# Patient Record
Sex: FEMALE | Race: WHITE | NOT HISPANIC OR LATINO | Employment: OTHER | URBAN - METROPOLITAN AREA
[De-identification: names, ages, dates, MRNs, and addresses within clinical notes are randomized per-mention and may not be internally consistent; named-entity substitution may affect disease eponyms.]

---

## 2018-05-24 ENCOUNTER — HOSPITAL ENCOUNTER (INPATIENT)
Facility: HOSPITAL | Age: 68
LOS: 50 days | Discharge: HOME | DRG: 834 | End: 2018-07-13
Attending: INTERNAL MEDICINE | Admitting: INTERNAL MEDICINE
Payer: MEDICARE

## 2018-05-24 DIAGNOSIS — C92.00 AML (ACUTE MYELOID LEUKEMIA) WITH FAILED REMISSION: ICD-10-CM

## 2018-05-24 DIAGNOSIS — E86.0 DEHYDRATION: ICD-10-CM

## 2018-05-24 DIAGNOSIS — E83.39 HYPOPHOSPHATEMIA: Primary | ICD-10-CM

## 2018-05-24 DIAGNOSIS — T45.1X5A ADVERSE EFFECT OF CHEMOTHERAPY, INITIAL ENCOUNTER: ICD-10-CM

## 2018-05-24 DIAGNOSIS — R50.81 NEUTROPENIC FEVER: ICD-10-CM

## 2018-05-24 DIAGNOSIS — D70.9 NEUTROPENIC FEVER: ICD-10-CM

## 2018-05-24 DIAGNOSIS — T45.1X5A CINV (CHEMOTHERAPY-INDUCED NAUSEA AND VOMITING): ICD-10-CM

## 2018-05-24 DIAGNOSIS — J81.1 PULMONARY EDEMA: ICD-10-CM

## 2018-05-24 DIAGNOSIS — I36.9 NONRHEUMATIC TRICUSPID VALVE DISORDER: ICD-10-CM

## 2018-05-24 DIAGNOSIS — C93.00 ACUTE MONOCYTIC LEUKEMIA NOT HAVING ACHIEVED REMISSION: ICD-10-CM

## 2018-05-24 DIAGNOSIS — C93.00: ICD-10-CM

## 2018-05-24 DIAGNOSIS — I42.9 CARDIOMYOPATHY: ICD-10-CM

## 2018-05-24 DIAGNOSIS — R94.31 PROLONGED Q-T INTERVAL ON ECG: ICD-10-CM

## 2018-05-24 DIAGNOSIS — C95.00 ACUTE LEUKEMIA: ICD-10-CM

## 2018-05-24 DIAGNOSIS — T45.1X5A CHEMOTHERAPY INDUCED CARDIOMYOPATHY: ICD-10-CM

## 2018-05-24 DIAGNOSIS — C92.00 AML (ACUTE MYELOBLASTIC LEUKEMIA): ICD-10-CM

## 2018-05-24 DIAGNOSIS — R11.2 CINV (CHEMOTHERAPY-INDUCED NAUSEA AND VOMITING): ICD-10-CM

## 2018-05-24 DIAGNOSIS — R21 RASH OF GENITAL AREA: ICD-10-CM

## 2018-05-24 DIAGNOSIS — E83.42 HYPOMAGNESEMIA: ICD-10-CM

## 2018-05-24 DIAGNOSIS — R00.0 TACHYCARDIA: ICD-10-CM

## 2018-05-24 DIAGNOSIS — I34.9 NONRHEUMATIC MITRAL VALVE DISORDER: ICD-10-CM

## 2018-05-24 DIAGNOSIS — D61.818 PANCYTOPENIA: ICD-10-CM

## 2018-05-24 DIAGNOSIS — E87.6 HYPOKALEMIA: ICD-10-CM

## 2018-05-24 DIAGNOSIS — T45.1X5A CHEMOTHERAPY ADVERSE REACTION: ICD-10-CM

## 2018-05-24 DIAGNOSIS — R93.1 ABNORMAL ECHOCARDIOGRAM: ICD-10-CM

## 2018-05-24 DIAGNOSIS — E83.51 HYPOCALCEMIA: ICD-10-CM

## 2018-05-24 DIAGNOSIS — C92.00 ACUTE MYELOID LEUKEMIA NOT HAVING ACHIEVED REMISSION: ICD-10-CM

## 2018-05-24 DIAGNOSIS — I42.7 CHEMOTHERAPY INDUCED CARDIOMYOPATHY: ICD-10-CM

## 2018-05-24 PROCEDURE — 20600001 HC STEP DOWN PRIVATE ROOM

## 2018-05-24 RX ORDER — CIPROFLOXACIN 500 MG/1
500 TABLET ORAL EVERY 12 HOURS
Status: DISCONTINUED | OUTPATIENT
Start: 2018-05-24 | End: 2018-05-28

## 2018-05-24 RX ORDER — HYDROXYUREA 500 MG/1
2000 CAPSULE ORAL 2 TIMES DAILY
Status: DISCONTINUED | OUTPATIENT
Start: 2018-05-24 | End: 2018-05-27

## 2018-05-24 RX ORDER — METOPROLOL SUCCINATE 25 MG/1
25 TABLET, EXTENDED RELEASE ORAL DAILY
Status: ON HOLD | COMMUNITY
End: 2018-07-13 | Stop reason: HOSPADM

## 2018-05-24 RX ORDER — ESCITALOPRAM OXALATE 10 MG/1
10 TABLET ORAL DAILY
COMMUNITY
End: 2019-06-19

## 2018-05-24 RX ORDER — OMEPRAZOLE 20 MG/1
20 TABLET, DELAYED RELEASE ORAL EVERY MORNING
Status: ON HOLD | COMMUNITY
End: 2020-10-28 | Stop reason: HOSPADM

## 2018-05-24 RX ORDER — ALLOPURINOL 300 MG/1
300 TABLET ORAL DAILY
Status: DISCONTINUED | OUTPATIENT
Start: 2018-05-24 | End: 2018-06-11

## 2018-05-24 RX ORDER — ACYCLOVIR 200 MG/1
400 CAPSULE ORAL 2 TIMES DAILY
Status: DISCONTINUED | OUTPATIENT
Start: 2018-05-24 | End: 2018-06-07

## 2018-05-25 ENCOUNTER — RESEARCH ENCOUNTER (OUTPATIENT)
Dept: RESEARCH | Facility: HOSPITAL | Age: 68
End: 2018-05-25

## 2018-05-25 PROBLEM — K21.9 GERD (GASTROESOPHAGEAL REFLUX DISEASE): Status: ACTIVE | Noted: 2018-05-25

## 2018-05-25 PROBLEM — M81.0 OSTEOPOROSIS: Status: ACTIVE | Noted: 2018-05-25

## 2018-05-25 PROBLEM — C92.00 AML (ACUTE MYELOBLASTIC LEUKEMIA): Status: ACTIVE | Noted: 2018-05-24

## 2018-05-25 PROBLEM — E83.39 HYPOPHOSPHATEMIA: Status: RESOLVED | Noted: 2018-05-25 | Resolved: 2018-05-25

## 2018-05-25 PROBLEM — I10 HTN (HYPERTENSION): Status: ACTIVE | Noted: 2018-05-25

## 2018-05-25 PROBLEM — R21 RASH: Status: ACTIVE | Noted: 2018-05-25

## 2018-05-25 PROBLEM — F32.A DEPRESSION: Status: ACTIVE | Noted: 2018-05-25

## 2018-05-25 PROBLEM — E87.6 HYPOKALEMIA: Status: ACTIVE | Noted: 2018-05-25

## 2018-05-25 PROBLEM — E83.39 HYPOPHOSPHATEMIA: Status: ACTIVE | Noted: 2018-05-25

## 2018-05-25 PROBLEM — E87.8 ELECTROLYTE ABNORMALITY: Status: ACTIVE | Noted: 2018-05-25

## 2018-05-25 PROBLEM — C95.00 ACUTE LEUKEMIA: Status: ACTIVE | Noted: 2018-05-24

## 2018-05-25 LAB
ABO + RH BLD: NORMAL
ALBUMIN SERPL BCP-MCNC: 3.2 G/DL
ALP SERPL-CCNC: 211 U/L
ALT SERPL W/O P-5'-P-CCNC: 19 U/L
ANION GAP SERPL CALC-SCNC: 12 MMOL/L
ANISOCYTOSIS BLD QL SMEAR: SLIGHT
APTT BLDCRRT: <21 SEC
AST SERPL-CCNC: 24 U/L
BASOPHILS # BLD AUTO: ABNORMAL K/UL
BASOPHILS NFR BLD: 1 %
BILIRUB SERPL-MCNC: 0.5 MG/DL
BLD GP AB SCN CELLS X3 SERPL QL: NORMAL
BONE MARROW WRIGHT STAIN COMMENT: NORMAL
BUN SERPL-MCNC: 10 MG/DL
CALCIUM SERPL-MCNC: 8.3 MG/DL
CHLORIDE SERPL-SCNC: 106 MMOL/L
CO2 SERPL-SCNC: 21 MMOL/L
CREAT SERPL-MCNC: 1.4 MG/DL
DIFFERENTIAL METHOD: ABNORMAL
EOSINOPHIL # BLD AUTO: ABNORMAL K/UL
EOSINOPHIL NFR BLD: 0 %
ERYTHROCYTE [DISTWIDTH] IN BLOOD BY AUTOMATED COUNT: 17.9 %
EST. GFR  (AFRICAN AMERICAN): 44.9 ML/MIN/1.73 M^2
EST. GFR  (NON AFRICAN AMERICAN): 38.9 ML/MIN/1.73 M^2
ESTIMATED PA SYSTOLIC PRESSURE: 36.18
FIBRINOGEN PPP-MCNC: 431 MG/DL
GLOBAL PERICARDIAL EFFUSION: NORMAL
GLUCOSE SERPL-MCNC: 113 MG/DL
HAV IGM SERPL QL IA: NEGATIVE
HBV CORE IGM SERPL QL IA: NEGATIVE
HBV SURFACE AG SERPL QL IA: NEGATIVE
HCT VFR BLD AUTO: 23.6 %
HCV AB SERPL QL IA: NEGATIVE
HGB BLD-MCNC: 7.5 G/DL
HIV 1+2 AB+HIV1 P24 AG SERPL QL IA: NEGATIVE
IMM GRANULOCYTES # BLD AUTO: ABNORMAL K/UL
IMM GRANULOCYTES NFR BLD AUTO: ABNORMAL %
INR PPP: 1.1
LDH SERPL L TO P-CCNC: 570 U/L
LYMPHOCYTES # BLD AUTO: ABNORMAL K/UL
LYMPHOCYTES NFR BLD: 10 %
MAGNESIUM SERPL-MCNC: 1.7 MG/DL
MCH RBC QN AUTO: 32.1 PG
MCHC RBC AUTO-ENTMCNC: 31.8 G/DL
MCV RBC AUTO: 101 FL
METAMYELOCYTES NFR BLD MANUAL: 2 %
MONOCYTES # BLD AUTO: ABNORMAL K/UL
MONOCYTES NFR BLD: 20 %
MYELOCYTES NFR BLD MANUAL: 4 %
NEUTROPHILS NFR BLD: 5 %
NEUTS BAND NFR BLD MANUAL: 2 %
NRBC BLD-RTO: 0 /100 WBC
OVALOCYTES BLD QL SMEAR: ABNORMAL
PATH REV BLD -IMP: NORMAL
PATH REV BLD -IMP: NORMAL
PHOSPHATE SERPL-MCNC: 1.7 MG/DL
PLATELET # BLD AUTO: 135 K/UL
PMV BLD AUTO: 10 FL
POIKILOCYTOSIS BLD QL SMEAR: SLIGHT
POLYCHROMASIA BLD QL SMEAR: ABNORMAL
POTASSIUM SERPL-SCNC: 3 MMOL/L
PROMYELOCYTES NFR BLD MANUAL: 1 %
PROT SERPL-MCNC: 6.9 G/DL
PROTHROMBIN TIME: 11.5 SEC
RBC # BLD AUTO: 2.34 M/UL
RETIRED EF AND QEF - SEE NOTES: 65 (ref 55–65)
SODIUM SERPL-SCNC: 139 MMOL/L
TRICUSPID VALVE REGURGITATION: NORMAL
URATE SERPL-MCNC: 10.3 MG/DL
URATE SERPL-MCNC: 7.7 MG/DL
WBC # BLD AUTO: 99.7 K/UL
WBC OTHER NFR BLD MANUAL: 55 %

## 2018-05-25 PROCEDURE — 88275 CYTOGENETICS 100-300: CPT

## 2018-05-25 PROCEDURE — 88184 FLOWCYTOMETRY/ TC 1 MARKER: CPT | Performed by: PATHOLOGY

## 2018-05-25 PROCEDURE — 88313 SPECIAL STAINS GROUP 2: CPT | Mod: 26,,, | Performed by: PATHOLOGY

## 2018-05-25 PROCEDURE — 88342 IMHCHEM/IMCYTCHM 1ST ANTB: CPT | Performed by: PATHOLOGY

## 2018-05-25 PROCEDURE — 25000003 PHARM REV CODE 250: Performed by: INTERNAL MEDICINE

## 2018-05-25 PROCEDURE — 88271 CYTOGENETICS DNA PROBE: CPT | Mod: 59

## 2018-05-25 PROCEDURE — 84550 ASSAY OF BLOOD/URIC ACID: CPT | Mod: 91

## 2018-05-25 PROCEDURE — 88271 CYTOGENETICS DNA PROBE: CPT

## 2018-05-25 PROCEDURE — 88311 DECALCIFY TISSUE: CPT | Mod: 26,,, | Performed by: PATHOLOGY

## 2018-05-25 PROCEDURE — C1751 CATH, INF, PER/CENT/MIDLINE: HCPCS

## 2018-05-25 PROCEDURE — 63600175 PHARM REV CODE 636 W HCPCS: Performed by: INTERNAL MEDICINE

## 2018-05-25 PROCEDURE — 81310 NPM1 GENE: CPT

## 2018-05-25 PROCEDURE — 88185 FLOWCYTOMETRY/TC ADD-ON: CPT | Performed by: PATHOLOGY

## 2018-05-25 PROCEDURE — 84550 ASSAY OF BLOOD/URIC ACID: CPT

## 2018-05-25 PROCEDURE — 80053 COMPREHEN METABOLIC PANEL: CPT

## 2018-05-25 PROCEDURE — 88275 CYTOGENETICS 100-300: CPT | Mod: 59

## 2018-05-25 PROCEDURE — 88305 TISSUE EXAM BY PATHOLOGIST: CPT | Mod: 26,,, | Performed by: PATHOLOGY

## 2018-05-25 PROCEDURE — 84100 ASSAY OF PHOSPHORUS: CPT

## 2018-05-25 PROCEDURE — 02HV33Z INSERTION OF INFUSION DEVICE INTO SUPERIOR VENA CAVA, PERCUTANEOUS APPROACH: ICD-10-PCS | Performed by: INTERNAL MEDICINE

## 2018-05-25 PROCEDURE — A4216 STERILE WATER/SALINE, 10 ML: HCPCS | Performed by: INTERNAL MEDICINE

## 2018-05-25 PROCEDURE — 25000003 PHARM REV CODE 250: Performed by: STUDENT IN AN ORGANIZED HEALTH CARE EDUCATION/TRAINING PROGRAM

## 2018-05-25 PROCEDURE — 83615 LACTATE (LD) (LDH) ENZYME: CPT

## 2018-05-25 PROCEDURE — 93306 TTE W/DOPPLER COMPLETE: CPT

## 2018-05-25 PROCEDURE — 25000003 PHARM REV CODE 250: Performed by: NURSE PRACTITIONER

## 2018-05-25 PROCEDURE — 85384 FIBRINOGEN ACTIVITY: CPT

## 2018-05-25 PROCEDURE — 38222 DX BONE MARROW BX & ASPIR: CPT | Mod: LT,,, | Performed by: NURSE PRACTITIONER

## 2018-05-25 PROCEDURE — 88189 FLOWCYTOMETRY/READ 16 & >: CPT | Mod: ,,, | Performed by: PATHOLOGY

## 2018-05-25 PROCEDURE — 07DR3ZX EXTRACTION OF ILIAC BONE MARROW, PERCUTANEOUS APPROACH, DIAGNOSTIC: ICD-10-PCS | Performed by: INTERNAL MEDICINE

## 2018-05-25 PROCEDURE — 88341 IMHCHEM/IMCYTCHM EA ADD ANTB: CPT | Mod: 26,,, | Performed by: PATHOLOGY

## 2018-05-25 PROCEDURE — 85060 BLOOD SMEAR INTERPRETATION: CPT | Mod: ,,, | Performed by: PATHOLOGY

## 2018-05-25 PROCEDURE — 81245 FLT3 GENE: CPT

## 2018-05-25 PROCEDURE — 83735 ASSAY OF MAGNESIUM: CPT

## 2018-05-25 PROCEDURE — 86901 BLOOD TYPING SEROLOGIC RH(D): CPT

## 2018-05-25 PROCEDURE — 80074 ACUTE HEPATITIS PANEL: CPT

## 2018-05-25 PROCEDURE — 85027 COMPLETE CBC AUTOMATED: CPT

## 2018-05-25 PROCEDURE — 85097 BONE MARROW INTERPRETATION: CPT | Mod: ,,, | Performed by: PATHOLOGY

## 2018-05-25 PROCEDURE — 36569 INSJ PICC 5 YR+ W/O IMAGING: CPT

## 2018-05-25 PROCEDURE — 93306 TTE W/DOPPLER COMPLETE: CPT | Mod: 26,,, | Performed by: INTERNAL MEDICINE

## 2018-05-25 PROCEDURE — 63600175 PHARM REV CODE 636 W HCPCS: Performed by: NURSE PRACTITIONER

## 2018-05-25 PROCEDURE — 36415 COLL VENOUS BLD VENIPUNCTURE: CPT

## 2018-05-25 PROCEDURE — 86703 HIV-1/HIV-2 1 RESULT ANTBDY: CPT

## 2018-05-25 PROCEDURE — 88305 TISSUE EXAM BY PATHOLOGIST: CPT | Performed by: PATHOLOGY

## 2018-05-25 PROCEDURE — 88313 SPECIAL STAINS GROUP 2: CPT

## 2018-05-25 PROCEDURE — 85610 PROTHROMBIN TIME: CPT

## 2018-05-25 PROCEDURE — 20600001 HC STEP DOWN PRIVATE ROOM

## 2018-05-25 PROCEDURE — 81450 HL NEO GSAP 5-50DNA/DNA&RNA: CPT

## 2018-05-25 PROCEDURE — 88264 CHROMOSOME ANALYSIS 20-25: CPT

## 2018-05-25 PROCEDURE — 88237 TISSUE CULTURE BONE MARROW: CPT

## 2018-05-25 PROCEDURE — 81218 CEBPA GENE FULL SEQUENCE: CPT

## 2018-05-25 PROCEDURE — 76937 US GUIDE VASCULAR ACCESS: CPT

## 2018-05-25 PROCEDURE — 86920 COMPATIBILITY TEST SPIN: CPT

## 2018-05-25 PROCEDURE — 99223 1ST HOSP IP/OBS HIGH 75: CPT | Mod: AI,GC,, | Performed by: INTERNAL MEDICINE

## 2018-05-25 PROCEDURE — 88342 IMHCHEM/IMCYTCHM 1ST ANTB: CPT | Mod: 26,,, | Performed by: PATHOLOGY

## 2018-05-25 PROCEDURE — 85730 THROMBOPLASTIN TIME PARTIAL: CPT

## 2018-05-25 PROCEDURE — 85007 BL SMEAR W/DIFF WBC COUNT: CPT

## 2018-05-25 RX ORDER — SODIUM CHLORIDE 0.9 % (FLUSH) 0.9 %
10 SYRINGE (ML) INJECTION
Status: DISCONTINUED | OUTPATIENT
Start: 2018-05-25 | End: 2018-07-13 | Stop reason: HOSPADM

## 2018-05-25 RX ORDER — HYDROMORPHONE HYDROCHLORIDE 1 MG/ML
0.5 INJECTION, SOLUTION INTRAMUSCULAR; INTRAVENOUS; SUBCUTANEOUS ONCE
Status: COMPLETED | OUTPATIENT
Start: 2018-05-25 | End: 2018-05-25

## 2018-05-25 RX ORDER — LANOLIN ALCOHOL/MO/W.PET/CERES
400 CREAM (GRAM) TOPICAL EVERY 4 HOURS PRN
Status: DISCONTINUED | OUTPATIENT
Start: 2018-05-25 | End: 2018-05-25

## 2018-05-25 RX ORDER — ALPRAZOLAM 0.25 MG/1
0.25 TABLET ORAL 2 TIMES DAILY PRN
Status: DISCONTINUED | OUTPATIENT
Start: 2018-05-25 | End: 2018-05-29

## 2018-05-25 RX ORDER — INSULIN ASPART 100 [IU]/ML
0-5 INJECTION, SOLUTION INTRAVENOUS; SUBCUTANEOUS
Status: DISCONTINUED | OUTPATIENT
Start: 2018-05-25 | End: 2018-05-25

## 2018-05-25 RX ORDER — ESCITALOPRAM OXALATE 10 MG/1
10 TABLET ORAL DAILY
Status: DISCONTINUED | OUTPATIENT
Start: 2018-05-25 | End: 2018-07-13 | Stop reason: HOSPADM

## 2018-05-25 RX ORDER — GLUCAGON 1 MG
1 KIT INJECTION
Status: DISCONTINUED | OUTPATIENT
Start: 2018-05-25 | End: 2018-05-25

## 2018-05-25 RX ORDER — POTASSIUM CHLORIDE 750 MG/1
20 CAPSULE, EXTENDED RELEASE ORAL
Status: DISCONTINUED | OUTPATIENT
Start: 2018-05-25 | End: 2018-05-25

## 2018-05-25 RX ORDER — ONDANSETRON 4 MG/1
4 TABLET, ORALLY DISINTEGRATING ORAL EVERY 8 HOURS PRN
Status: DISCONTINUED | OUTPATIENT
Start: 2018-05-25 | End: 2018-05-29

## 2018-05-25 RX ORDER — POTASSIUM CHLORIDE 20 MEQ/1
40 TABLET, EXTENDED RELEASE ORAL ONCE
Status: COMPLETED | OUTPATIENT
Start: 2018-05-25 | End: 2018-05-25

## 2018-05-25 RX ORDER — ONDANSETRON 4 MG/1
4 TABLET, ORALLY DISINTEGRATING ORAL ONCE
Status: DISCONTINUED | OUTPATIENT
Start: 2018-05-25 | End: 2018-05-28

## 2018-05-25 RX ORDER — SODIUM CHLORIDE 9 MG/ML
INJECTION, SOLUTION INTRAVENOUS CONTINUOUS
Status: DISCONTINUED | OUTPATIENT
Start: 2018-05-25 | End: 2018-05-28

## 2018-05-25 RX ORDER — IBUPROFEN 200 MG
24 TABLET ORAL
Status: DISCONTINUED | OUTPATIENT
Start: 2018-05-25 | End: 2018-05-25

## 2018-05-25 RX ORDER — SODIUM,POTASSIUM PHOSPHATES 280-250MG
2 POWDER IN PACKET (EA) ORAL EVERY 4 HOURS PRN
Status: DISCONTINUED | OUTPATIENT
Start: 2018-05-25 | End: 2018-05-25

## 2018-05-25 RX ORDER — OXYCODONE AND ACETAMINOPHEN 5; 325 MG/1; MG/1
1 TABLET ORAL ONCE
Status: COMPLETED | OUTPATIENT
Start: 2018-05-25 | End: 2018-05-25

## 2018-05-25 RX ORDER — ENOXAPARIN SODIUM 100 MG/ML
40 INJECTION SUBCUTANEOUS EVERY 24 HOURS
Status: DISCONTINUED | OUTPATIENT
Start: 2018-05-25 | End: 2018-05-27

## 2018-05-25 RX ORDER — ONDANSETRON 4 MG/1
4 TABLET, FILM COATED ORAL ONCE
Status: COMPLETED | OUTPATIENT
Start: 2018-05-25 | End: 2018-05-25

## 2018-05-25 RX ORDER — SODIUM,POTASSIUM PHOSPHATES 280-250MG
1 POWDER IN PACKET (EA) ORAL EVERY 4 HOURS PRN
Status: DISCONTINUED | OUTPATIENT
Start: 2018-05-25 | End: 2018-05-25

## 2018-05-25 RX ORDER — FAMOTIDINE 20 MG/1
20 TABLET, FILM COATED ORAL DAILY
Status: DISCONTINUED | OUTPATIENT
Start: 2018-05-25 | End: 2018-05-29

## 2018-05-25 RX ORDER — IBUPROFEN 200 MG
16 TABLET ORAL
Status: DISCONTINUED | OUTPATIENT
Start: 2018-05-25 | End: 2018-05-25

## 2018-05-25 RX ORDER — FAMOTIDINE 20 MG/1
20 TABLET, FILM COATED ORAL 2 TIMES DAILY
Status: DISCONTINUED | OUTPATIENT
Start: 2018-05-25 | End: 2018-05-25

## 2018-05-25 RX ORDER — ONDANSETRON 4 MG/1
4 TABLET, ORALLY DISINTEGRATING ORAL ONCE
Status: DISCONTINUED | OUTPATIENT
Start: 2018-05-25 | End: 2018-05-25

## 2018-05-25 RX ORDER — LANOLIN ALCOHOL/MO/W.PET/CERES
800 CREAM (GRAM) TOPICAL EVERY 4 HOURS PRN
Status: DISCONTINUED | OUTPATIENT
Start: 2018-05-25 | End: 2018-05-25

## 2018-05-25 RX ORDER — ACETAMINOPHEN 325 MG/1
650 TABLET ORAL EVERY 4 HOURS PRN
Status: DISCONTINUED | OUTPATIENT
Start: 2018-05-25 | End: 2018-06-14

## 2018-05-25 RX ORDER — SODIUM CHLORIDE 0.9 % (FLUSH) 0.9 %
5 SYRINGE (ML) INJECTION
Status: DISCONTINUED | OUTPATIENT
Start: 2018-05-25 | End: 2018-07-06

## 2018-05-25 RX ORDER — SODIUM CHLORIDE 0.9 % (FLUSH) 0.9 %
10 SYRINGE (ML) INJECTION EVERY 6 HOURS
Status: DISCONTINUED | OUTPATIENT
Start: 2018-05-25 | End: 2018-07-13 | Stop reason: HOSPADM

## 2018-05-25 RX ORDER — LIDOCAINE HYDROCHLORIDE 20 MG/ML
10 INJECTION, SOLUTION INFILTRATION; PERINEURAL ONCE
Status: COMPLETED | OUTPATIENT
Start: 2018-05-25 | End: 2018-05-25

## 2018-05-25 RX ADMIN — ONDANSETRON 4 MG: 4 TABLET, ORALLY DISINTEGRATING ORAL at 07:05

## 2018-05-25 RX ADMIN — ALLOPURINOL 300 MG: 300 TABLET ORAL at 09:05

## 2018-05-25 RX ADMIN — FAMOTIDINE 20 MG: 20 TABLET ORAL at 09:05

## 2018-05-25 RX ADMIN — CIPROFLOXACIN HYDROCHLORIDE 500 MG: 500 TABLET, FILM COATED ORAL at 09:05

## 2018-05-25 RX ADMIN — ONDANSETRON HYDROCHLORIDE 4 MG: 4 TABLET, FILM COATED ORAL at 02:05

## 2018-05-25 RX ADMIN — ESCITALOPRAM 10 MG: 5 TABLET, FILM COATED ORAL at 09:05

## 2018-05-25 RX ADMIN — LIDOCAINE HYDROCHLORIDE 10 ML: 20 INJECTION, SOLUTION INFILTRATION; PERINEURAL at 02:05

## 2018-05-25 RX ADMIN — POTASSIUM CHLORIDE 40 MEQ: 1500 TABLET, EXTENDED RELEASE ORAL at 02:05

## 2018-05-25 RX ADMIN — HYDROXYUREA 2000 MG: 500 CAPSULE ORAL at 12:05

## 2018-05-25 RX ADMIN — Medication 10 ML: at 10:05

## 2018-05-25 RX ADMIN — ALPRAZOLAM 0.25 MG: 0.25 TABLET ORAL at 09:05

## 2018-05-25 RX ADMIN — SODIUM CHLORIDE: 0.9 INJECTION, SOLUTION INTRAVENOUS at 02:05

## 2018-05-25 RX ADMIN — CIPROFLOXACIN HYDROCHLORIDE 500 MG: 500 TABLET, FILM COATED ORAL at 10:05

## 2018-05-25 RX ADMIN — CIPROFLOXACIN HYDROCHLORIDE 500 MG: 500 TABLET, FILM COATED ORAL at 12:05

## 2018-05-25 RX ADMIN — HYDROMORPHONE HYDROCHLORIDE 0.5 MG: 1 INJECTION, SOLUTION INTRAMUSCULAR; INTRAVENOUS; SUBCUTANEOUS at 02:05

## 2018-05-25 RX ADMIN — ENOXAPARIN SODIUM 40 MG: 100 INJECTION SUBCUTANEOUS at 05:05

## 2018-05-25 RX ADMIN — HYDROXYUREA 2000 MG: 500 CAPSULE ORAL at 09:05

## 2018-05-25 RX ADMIN — SODIUM CHLORIDE: 0.9 INJECTION, SOLUTION INTRAVENOUS at 10:05

## 2018-05-25 RX ADMIN — OXYCODONE HYDROCHLORIDE AND ACETAMINOPHEN 1 TABLET: 5; 325 TABLET ORAL at 07:05

## 2018-05-25 RX ADMIN — SODIUM PHOSPHATE, MONOBASIC, MONOHYDRATE 15 MMOL: 276; 142 INJECTION, SOLUTION INTRAVENOUS at 02:05

## 2018-05-25 RX ADMIN — Medication 10 ML: at 06:05

## 2018-05-25 RX ADMIN — ACYCLOVIR 400 MG: 200 CAPSULE ORAL at 09:05

## 2018-05-25 RX ADMIN — OXYCODONE HYDROCHLORIDE AND ACETAMINOPHEN 1 TABLET: 5; 325 TABLET ORAL at 01:05

## 2018-05-25 RX ADMIN — ACYCLOVIR 400 MG: 200 CAPSULE ORAL at 12:05

## 2018-05-25 RX ADMIN — ALLOPURINOL 300 MG: 300 TABLET ORAL at 12:05

## 2018-05-25 RX ADMIN — ACYCLOVIR 400 MG: 200 CAPSULE ORAL at 10:05

## 2018-05-25 RX ADMIN — ACETAMINOPHEN 650 MG: 325 TABLET, FILM COATED ORAL at 12:05

## 2018-05-25 RX ADMIN — HYDROXYUREA 2000 MG: 500 CAPSULE ORAL at 10:05

## 2018-05-25 NOTE — PLAN OF CARE
Problem: Patient Care Overview  Goal: Plan of Care Review  Outcome: Ongoing (interventions implemented as appropriate)  AAOx4, in a pleasant mood. Ambulated independently. Able to vocalize needs and participate in care. IVF infusing  And electrolyted replaced. Swallows w/o complication. Headache x 1 managed w PO PRN pain meds. Oriented to room and call light. Bed locked in low position, call light in reach.

## 2018-05-25 NOTE — ASSESSMENT & PLAN NOTE
- rash to BLE (shins)  - states it occurred mid April 2018 after 3rd dose of prolia  - possible that rash is from AML  - improving on own, will defer bx at this time and continue to monitor

## 2018-05-25 NOTE — PROGRESS NOTES
[facility]  Hematology  Bone Marrow Transplant  Progress Note    Patient Name: Noreen Mac  Admission Date: 5/24/2018  Hospital Length of Stay: 1 days  Code Status: Full Code    Subjective:     Interval History:   Pt admitted overnight for likely new AML. Pt is now on hydrea 2 grams BID, allopurinol 300 mg daily, and IVF. Pt may need rasburicase this weekend. She will undergo a BM bx and aspiration today. She is an induction candidate and will not be screened for research trials. She has anxiety for which she usually takes xanax, this was re-started.     Objective:     Vital Signs (Most Recent):  Temp: 98.3 °F (36.8 °C) (05/25/18 1153)  Pulse: 88 (05/25/18 1153)  Resp: 19 (05/25/18 1153)  BP: (!) 107/59 (05/25/18 1153)  SpO2: (!) 93 % (05/25/18 1153) Vital Signs (24h Range):  Temp:  [98 °F (36.7 °C)-99 °F (37.2 °C)] 98.3 °F (36.8 °C)  Pulse:  [76-88] 88  Resp:  [18-21] 19  SpO2:  [92 %-95 %] 93 %  BP: (107-123)/(53-59) 107/59     Weight: 75.7 kg (166 lb 12.5 oz)  Body mass index is 29.54 kg/m².  Body surface area is 1.83 meters squared.    ECOG SCORE         90% KPS    Intake/Output - Last 3 Shifts     None          Physical Exam   Constitutional: She is oriented to person, place, and time. Vital signs are normal. She is cooperative.   HENT:   Head: Normocephalic.   Eyes: Lids are normal.   Neck: Trachea normal and normal range of motion.   Cardiovascular: Normal rate, regular rhythm, S1 normal, S2 normal and normal heart sounds.    Pulmonary/Chest: Effort normal and breath sounds normal.   Abdominal: Soft. Normal appearance and bowel sounds are normal.   Musculoskeletal: Normal range of motion.   Lymphadenopathy:        Head (right side): No submental, no submandibular, no tonsillar, no preauricular, no posterior auricular and no occipital adenopathy present.        Head (left side): No submental, no submandibular, no tonsillar, no preauricular, no posterior auricular and no occipital  adenopathy present.     She has no cervical adenopathy.        Right: No supraclavicular adenopathy present.        Left: No supraclavicular adenopathy present.   Neurological: She is alert and oriented to person, place, and time. Coordination and gait normal.   Skin: Skin is dry and intact. She is not diaphoretic. No pallor.   RUE PICC line with drsg C/D/I  Various small maculopapular lesions/rash to BLE (shins, R more than L). Per pt, this is improving   Psychiatric: She has a normal mood and affect. Her speech is normal and behavior is normal. Judgment and thought content normal. Cognition and memory are normal.       Significant Labs:   CBC:   Recent Labs  Lab 05/25/18  0505   WBC 99.70*   HGB 7.5*   HCT 23.6*   *   , CMP:   Recent Labs  Lab 05/25/18  0011      K 3.0*      CO2 21*   *   BUN 10   CREATININE 1.4   CALCIUM 8.3*   PROT 6.9   ALBUMIN 3.2*   BILITOT 0.5   ALKPHOS 211*   AST 24   ALT 19   ANIONGAP 12   EGFRNONAA 38.9*    and Uric Acid   Recent Labs  Lab 05/25/18  0011   URICACID 10.3*       Diagnostic Results:  I have reviewed all pertinent imaging results/findings within the past 24 hours.   ECHO 5/25/18, ordered, not performed yet  Chest xray after PICC placement 5/25/18, in process      Assessment/Plan:     * AML (acute myeloblastic leukemia)    - Admitted from [facility] on 5/25/18 early AM with WBC around 100s  - Likely new AML  - Bone marrow biopsy and aspiration 5/25/18 to confirm diagnosis and risk category. Ordered routine labs in addition to FLT3, CEBPA, NPM1, NGS, and AML FISH  - Pt is an induction candidate. Will not screen for research trials. Will likely begin 7+3 chemo once pathology report in.   - Will monitor for tumor lysis syndrome once chemotherapy starts  - With elevated uric acid, on allopurinol 300 mg daily. Increased IVFs to NS at 200 ml/hr. May need rasburicase over the weekend   - WBCs elevated--continue hydrea 2 grams BID  - ECHO ordered  5/25/18, pending  - PICC line placed 5/25/18, xray to confirm placement pending  - HIV and hepatitis panel done 5/25/18, pending  - CT head done at OSH for headaches, was negative for any acute process. Headache now resolved after prn pain medication  - Will perform lo res HLA typing on 5/26/18 and hi res on 5/27/18 in anticipation of possible need for future transplant   - Pt with two daughters, two full sisters (in their mid 60s, one with brain aneurysm hx, other one without any medical issues), and one full brother (61 y/o healthy)         Rash    - rash to BLE (shins)  - states it occurred mid April 2018 after 3rd dose of prolia  - possible that rash is from AML  - improving on own, will defer bx at this time and continue to monitor        HTN (hypertension)    - takes metoprolol at home  - will hold at this time as HR/BP are controlled; plan to restart if HR/BP rise  - continue to monitor        Electrolyte abnormality    - will hold off on BMT PRN electrolyte replacement protocol due to possible TLS risk in future with start of chemotherapy   - will replace electrolytes as needed day by day  - today: with hypokalemia and hypophosphatemia, was replaced        GERD (gastroesophageal reflux disease)    - takes Omeprazole at home, now on pepcid here  - no issue         Osteoporosis    - takes prolia once every 6 months, last given mid April 2018   - no acute issue         Depression    - continue home Escitalopram   - no acute issue   - with associated anxiety--takes xanax at home but unaware of dose. Started on xanax 0.25 mg bid prn, can increase if needed            VTE Risk Mitigation         Ordered     enoxaparin injection 40 mg  Daily      05/25/18 0553     Place MAYLIN hose  Until discontinued      05/25/18 0032     IP VTE HIGH RISK PATIENT  Once      05/25/18 0032          Disposition: pending BM bx, induction chemo, and then count recovery thereafter.     Norma Berg NP  Bone Marrow Transplant  [facility]

## 2018-05-25 NOTE — HOSPITAL COURSE
68 y.o. female admitted overnight for possible new AML. Came in from OSH with elevated WBC of 100.   05/25/2018: Pt is now on hydrea 2 grams BID, allopurinol 300 mg daily, and IVF. Pt may need rasburicase this weekend. She will undergo a BM bx and aspiration today. She is an induction candidate and will not be screened for research trials. She has anxiety for which she usually takes xanax, this was re-started.   05/26/2018 PICC placed. Reports she slept well last night. Anxiety improved with prn xanax. EF 65%, HIV and Hep panel negative. 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  6/12/2018: Day 15 of 7+3 induction, restaging BM bx done yesterday. On Midostaurin. Fever yesterday and BC with gram + cocci in clusters. On cefepime day 2 and will start Vanc today. Labial mucositis improving.   6/13/2018: Day 16 7+3. BC with staph aureus, identification pending. Surveillance blood cultures done today. Afebrile x 48 hours. On cefepime and Vanc. Labial mucositis resolved. No complaints of pain. Nausea controlled. No diarrhea. Primary complaint is fatigue.   6/14/20018: Day 17 of 7+3. Day 14 bone marrow results pending. BC with staph epidermis only sensitive to Vancomycin. Vanc day 3 and cefepime day 4. Vanc trough 12.2 last night. BP remains low but stable. Afebrile x 72 hours.   6/15/2018: Day 18 of 7+3. Day 14 bone marrow biopsy shows persistent disease with 15% blasts. Discussion with patient regarding treatment and she is deciding if she wants to proceed with re-induction vs outpatient maintenance. Temp of 100.4 overnight, unsustained. Day 5 Cefepime and Day 4 of Vanc for staph epidermis. Repeat cultures NGTD. BP improved. Electrolytes (mag, phos, K and calcium) replaced aggressively this am and will repeat labs this afternoon. No complaints this am.   06/16/2018: Day 19 of 7+3. Day 2 of Flag-JENNIFER. Remains afebrile. Calcium remains low and have increased PO supplementation. Remains on vanc and aztreonam. Somewhat loopy  feeling after receiving benadryl.   06/17/2018: Day 20 of 7+3. Day 3 of FLAG-JENNIFER. Multiple electrolyte abnormalities. New bilateral leg and feet swelling.   6/18/2018: Day 21 of 7+3 and Day 4 of FLAG JENNIFER. Tolerating well with some nausea controlled with Zofran. VSS, afebrile. ANC 1900 on Neupogen. Continues Vanc for staph epi bacteremia. Multiple electrolytes replaced today. Complains of edema to lower extremities, not improved after 20 of lasix yesterday. No sob or CP.   6/19/18: Day 22 of 7+3 and Day 5 of FLAG JENNIFER. VSS, afebrile, ANC 3900. Vanc trough elevated and dose adjusted this am. Lower extremity edema improved after 40 of lasix yesterday, net negative 300 cc I&O. Mild nausea, no abdominal pain or diarrhea. Poor appetite but no difficulty eating or taking pills.   6/20/18: Day 23 of 7+3 and Day 6 of FLAG JENNIFER. Patient complains of nausea and vomiting overnight and this am, especially when taking pills. Will add Zyprexa daily in addition to Zofran, compazine, and ativan PRN and will change meds to IV. 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 Afebrile.  No diarrhea, mouth sores or pain.  06/21/2018: Day 24 of 7+3 and Day 7 of FLAG JENNIFER. Nausea better controlled. Continued on Vanc.  6/22/2018: Day 25 of 7+3 and Day 8 of FLAG JENNIFER. Nausea improved some with Zyprexa but did have 1 episode of emesis overnight.continuing meds IV due to vomiting. Remains afebrile. ANC 0. Continues Vanc and Flagyl. Electrolytes replaced.   06/23/2018 : day 26 of 7+3 and Day 9 of FLAG JENNIFER.  Nausea persists, worsened after taking pills so meds converted to IV.  Encouraged ambulation.  Continue with flagyl for leptotrichia goodfellowii.  RUQ US showed intrahepatic dilation, overall impression hepatic steatosis.  CBD 0.5cm.  No Gallbladder.    06/24/2018 : day 27 of 7+3 and Day 10 of FLAG JENNIFER.  Nausea persisting, able to tolerate some po pills.  Last day of flagyl (day  5).  Still pancytopenic. Appetite still low as po intake triggers nausea.  06/25/2018: day 28 of 7+3 and Day 11 of FLAG JENNIFER. Afebrile, ANC 0. Has completed Flagyl. Will decrease Vanc to 1000 mg q12, trough 19.9, will complete Vanc on 6/27. Liver enzymes stable on Midostaurin. VSS.   6/26/2018: day 29 of 7+3 and day 12 of FLAG JENNIFER. Liver enzymes improved and Midostaurin increased to full dose 50 mg bid. Nausea controlled, afebrile, VSS, NEON.  6/27/2018: day 30 of 7+3 and Day 13 of FLAG JENNIFER. Persistent nausea and emesis x 1 yesterday. Compazine changed to scheduled. Vanc ends today. Afebrile, VSS. Aggressive electrolyte replacement, low electrolytes possibly due to Midostaurin.   6/28/2018: day 31 of 7+3 and Day 14 of FLAG JENNIFER. Nausea improved with scheduled Compazine. Patient has agreed to start converting some IV meds to po. VSS, afebrile, electrolytes wnl today. Only complains of fatigue, new rash noted to upper back and chest and legs, papular rash mildly red.  6/29/2018: Day 32 of 7+3 and Day 15 of FLAG JENNIFER. Day 14 restaging bone marrow biopsy done at bedside today. Patient states nausea improved but she did have emesis last night after taking 9 pm pills. Rash improved. No mouth sores, diarrhea or pain.   7/2/2018: Day 35 of 7+3 and Day 18 of FLAG JENNIFER. Restaging BM bx results pending. Fluid overload over the weekend. Chest x-ray with pulmonary edema. Os sats down to 88%. Placed on O2 at 2 L NC, off O2 this am. Received lasix. Net negative 2.7 L today. 1 episode of nausea, no emesis. Reports anxiety relieved with PRN meds. Electrolytes improved, afebrile, VSS.   7/3/2018: Day 36 of 7+3 and Day 19 of FLAG JENNIFER. Restaging Bm bx with GABRIEL. Cardiology consulted for abnormal echo, EF 30% with pulmonary HTN and severe L atrial enlargement. EKG with T wave abnormality, possible anterolateral ischemia and prolonged QTc of 530. Medications adjusted. Nausea controlled, no diarrhea. Electrolytes improved. On O2 as needed.    07/04/2018 : Day 37 of 7+3 and Day 20 of FLAG JENNIFER Diarrhea in AM, watery, no associated abdominal pain, nausea, or vomiting.    07/05/2018: Day 38 of 7+3 ane Day 21 of FLAG JENNIFER. No CP or SOB, cardiology following. On RA. All meds changed to po, denies nausea or vomiting and no diarrhea. VSS, afebrile.   7/6/2018: Day 22 of FLAG JENNIFER. Continues to tolerate po meds with no nausea. Afebrile. Awaiting count recovery for discharge.  7/7/2018-/8/2018: Day 40/41 of 7+3, Day 23/24 of FLAG JENNIFER.  Improving clinically.  Started on mag oxide per patient request.  Labs showing signs of ANC recovery.  7/9/2018: Day 25 FLAG JENNIFER, , continues Neupogen. Received platelets today. Afebrile, VSS. Tolerating all oral meds with no nausea or vomiting. Only complains of fatigue.   7/10/2018: Day 26 FLAG JENNIFER,  today. Had 2 episodes of vomiting and diarrhea last night. Feeling better. Afebrile, VSS.   7/11/2018: Day 27 FLAG JENNIFER,  today. No vomiting or diarrhea overnight and no nausea. Afebrile, VSS. Complains of back pain (bone pain) suspect due to count recovery. Relieved with oxy IR and Zyrtec started.  07/12/2018: Day 28 flag jennifer, engrafted today with ANC of 730. Back pain improved with zyrtec. Scheduled for IT chemo tomorrow at 1:30 pm and discharge home thereafter. Will likely need plt transfusion prior to IT chemo tomorrow   7/13/2018: Day 29 of FLAG JENNIFER. ANC up to 1300 today. Transfusing platelets today prior to LP for IT chemo. Patient continues to complain of bone pain, mostly to back and legs. No nausea, diarrhea, sob. Afebrile and VSS. Will discharge home today after IT chemo with follow-up on Monday 7/16 with Dr. Troy

## 2018-05-25 NOTE — PROGRESS NOTES
" Admit Assessment    Patient Identification  Noreen Mac   :  1950  Admit Date:  2018  Attending Provider:  Josette Singh MD              Referral:   Pt was admitted to  with a diagnosis of Acute leukemia, and was admitted this hospital stay due to Acute monocytic leukemia [C93.00].   is involved was referred to the Social Work Department via (Referal).  Patient presents as a 68 y.o. year old  female.    Persons interviewed: pt    Living Situation:  Prior to admission pt was living independently with her spouse Jesi Mac at [address] [place] [address]. Yair was working part-time at her [employer] company. Jesi works full time as a . Pt has two daughters, Arthur age 42 and Andressa age 39 that live in [place]. Pt also has three granddtrs.     Current or Past Agencies and Description of Services/Supplies    DME  N/A    Home Health  N/A    IV Infusion  N/A    Nutrition: oral    Outpatient Pharmacy:   No Pharmacies Listed    Patient Preference of agencies include none    Patient/Caregiver informed of right to choose providers or agencies.  Patient provides permission to release any necessary information to [facility] and to [facility] agencies as needed to facilitate patient care, treatment planning, and patient discharge planning.  Written and verbal resources provided.      Coping  Pt stated she feels like she has been in a "tornado" but that she is thankful for the care she is receiving at [facility]. Pt reports her mother  of CML in  at the age of 49. Pt stated her mother was an RN in radiology and would not wear protection during the radiation treatments. Pt stated her mother attributed her CML to the radiation.     Adjustment to Diagnosis and Treatment  appropriate    Emotional/Behavioral/Cognitive Issues  none    History/Current Symptoms of Anxiety/Depression: No  History/Current Substance Use:   Social History     Social History Main Topics    " Smoking status: Former Smoker     Packs/day: 0.50     Years: 10.00     Types: Cigarettes     Quit date: 3/25/2017    Smokeless tobacco: Not on file    Alcohol use 0.6 oz/week     1 Shots of liquor per week    Drug use: No    Sexual activity: Yes     Partners: Female       Indications of Abuse/Neglect: No  Abuse Screen  Do You Feel Unsafe at Home, Work or School?: no    Financial:  Payor/Plan Subscr  Sex Relation Sub. Ins. ID Effective Group Num        Other identified concerns/needs: none at this time    Plan: Pt anticipated to return home with family support at [place].     Interventions/Referrals: TBD    Patient/caregiver engaged in treatment planning process.     providing psychosocial and supportive counseling, resources, education, assistance and discharge planning as appropriate.  Patient/caregiver state understanding of  available resources,  following, remains available.

## 2018-05-25 NOTE — SUBJECTIVE & OBJECTIVE
Oncology Treatment Plan:   [No treatment plan]    Medications:  Continuous Infusions:   sodium chloride 0.9%       Scheduled Meds:   acyclovir  400 mg Oral BID    allopurinol  300 mg Oral Daily    ciprofloxacin HCl  500 mg Oral Q12H    hydroxyurea  2,000 mg Oral BID    ondansetron  4 mg Oral Once    potassium chloride  40 mEq Oral Once    sodium phosphate IVPB  15 mmol Intravenous Once     PRN Meds:     Review of patient's allergies indicates:   Allergen Reactions    Methotrexate analogues      Elevated Liver Enzyme    Bactrim [sulfamethoxazole-trimethoprim] Nausea And Vomiting and Rash        Past Medical History:   Diagnosis Date    Hypertension      Past Surgical History:   Procedure Laterality Date    CHOLECYSTECTOMY      HYSTERECTOMY      TONSILLECTOMY       Family History     None        Social History Main Topics    Smoking status: Former Smoker     Packs/day: 0.50     Years: 10.00     Types: Cigarettes     Quit date: 3/25/2017    Smokeless tobacco: Not on file    Alcohol use 0.6 oz/week     1 Shots of liquor per week    Drug use: No    Sexual activity: Yes     Partners: Female       Review of Systems   Constitutional: Positive for activity change and fatigue. Negative for chills and fever.   HENT: Negative for ear discharge, ear pain, rhinorrhea and sore throat.    Eyes: Negative for pain.   Respiratory: Negative for cough and shortness of breath.    Cardiovascular: Negative for chest pain and leg swelling.   Gastrointestinal: Negative for abdominal pain, constipation, diarrhea, nausea and vomiting.   Genitourinary: Negative for dysuria and hematuria.   Musculoskeletal: Negative for back pain and neck pain.   Skin: Positive for rash (bilateral anterior shin  ).   Neurological: Positive for headaches (3 out of 10 ). Negative for dizziness, weakness and numbness.     Objective:     Vital Signs (Most Recent):  Temp: 98.2 °F (36.8 °C) (05/24/18 2343)  Pulse: 76 (05/24/18 2343)  Resp: 18  (05/24/18 2343)  BP: (!) 116/55 (05/24/18 2343)  SpO2: (!) 93 % (05/24/18 2343) Vital Signs (24h Range):  Temp:  [98.2 °F (36.8 °C)-99 °F (37.2 °C)] 98.2 °F (36.8 °C)  Pulse:  [76-79] 76  Resp:  [18-21] 18  SpO2:  [93 %-95 %] 93 %  BP: (116-123)/(55-58) 116/55     Weight: 78.5 kg (173 lb)  Body mass index is 30.65 kg/m².  Body surface area is 1.87 meters squared.    No intake or output data in the 24 hours ending 05/25/18 0204    Physical Exam  General: Well developed, well nourished female in NAD  HEENT: Conjunctiva clepalear; Oropharynx clear  Neck: No JVD noted, Supple. No lymphadenopathy  CV: Normal S1, S2 with systolic murmurs.  Resp: Lungs CTA Bilaterally, Unlabored  Abdomen: NTND, BS normoactive x4 quads, soft. No hepatosplenomegaly  +RUQ scar.   Extrem: No cyanosis, clubbing, edema.  Skin: + bilateral anterior shin rashes. No lesions or ulcers  Neuro: motor strength in tact. No focal deficit   PSYCH: Oriented x3    Significant Labs:   CBC: No results for input(s): WBC, HGB, HCT, PLT in the last 48 hours. and CMP:     Recent Labs  Lab 05/25/18  0011      K 3.0*      CO2 21*   *   BUN 10   CREATININE 1.4   CALCIUM 8.3*   PROT 6.9   ALBUMIN 3.2*   BILITOT 0.5   ALKPHOS 211*   AST 24   ALT 19   ANIONGAP 12   EGFRNONAA 38.9*       Diagnostic Results:  CT: Head WO contrast at outside hospital showed no acut process   CT head report and CD in the chart

## 2018-05-25 NOTE — HPI
A 68 y.o female h/o connective tissue disease (not sure what exactly), depression and Osteoporosis who presented to OSH with a couple weeks h/o generalized malaise, myalgias and fatigue. He reports does not feel well and has no energy or appetite to eat. She complains of headache for couple days. Denies any vision changes or head trauma. Pt had lab works at an urgent clinic which showed elevate WBC (100s).     At OSH. He had a CT head WO contrast which showed no acute process. She did NOT have a bone marrow bx at OSH. She was transferred to [facility] main [facility] for further evaluation and treatment.     During my interview, she reports a headache (3 out of 10). Denies any vision changes, chest pain, sob, fever, chills, cough or bleeding. She reports family hx of leukemia. Her mother  at age of 49 with leukemia. Denies smoking but drinks occasionally. No h/o previous cancer, radiation or chemotherapy.

## 2018-05-25 NOTE — ASSESSMENT & PLAN NOTE
- Admitted from OSH ([facility]) on 5/25/18 early AM with WBC around 100s  - Likely new AML  - Bone marrow biopsy and aspiration 5/25/18 to confirm diagnosis and risk category. Ordered routine labs in addition to FLT3, CEBPA, NPM1, NGS, and AML FISH  - Pt is an induction candidate. Will not screen for research trials. Will likely begin 7+3 chemo once pathology report in.   - Will monitor for tumor lysis syndrome once chemotherapy starts  - With elevated uric acid, on allopurinol 300 mg daily. Increased IVFs to NS at 200 ml/hr. May need rasburicase over the weekend   - WBCs elevated--continue hydrea 2 grams BID  - ECHO ordered 5/25/18, pending  - PICC line placed 5/25/18, xray to confirm placement pending  - HIV and hepatitis panel done 5/25/18, pending  - CT head done at OSH for headaches, was negative for any acute process. Headache now resolved after prn pain medication  - Will perform lo res HLA typing on 5/26/18 and hi res on 5/27/18 in anticipation of possible need for future transplant   - Pt with two daughters, will ask about full siblings

## 2018-05-25 NOTE — CONSULTS
Double lumen PICC placed in right basilic vein of RUE, 32cm in length with 0cm exposed and 27cm arm circumference. Lot#[id number].

## 2018-05-25 NOTE — ASSESSMENT & PLAN NOTE
- will hold off on BMT PRN electrolyte replacement protocol due to possible TLS risk in future with start of chemotherapy   - will replace electrolytes as needed day by day  - today: with hypokalemia and hypophosphatemia, was replaced

## 2018-05-25 NOTE — ASSESSMENT & PLAN NOTE
- takes metoprolol at home  - will hold at this time as HR/BP are controlled; plan to restart if HR/BP rise  - continue to monitor

## 2018-05-25 NOTE — PROGRESS NOTES
The patient was approached by me on the BMT floor (Room 811) regarding participation in [facility] [employer] (DxB-022) study (IRB#[id number]). Pt was agreeable.    The Informed Consent Form (ICF) was reviewed with pt. The discussion included:    - participation is voluntary and will not prohibit her from participating in future research studies;  - the blood specimen (2 tubes) will be collected today;    - pt can change her mind about participating at any time;  - if she changes her mind about participation, she can call us at contact info in the ICF, and we will discard samples remaining;  - samples that have been used prior to her notification will still be included in research;  - specimens will be stored with unique code that can only be linked to pt by [employer] staff;  - all medical information released to researchers will be stripped of identifiers;  - samples will not be released to outside researchers unless approved by internal committee;  - there is a small risk of loss of confidentiality, but we make every effort to ensure privacy;  - no other physical risks outside of those involved in standard of care procedure.    Aminata Mckenzie, the patient's Nurse Practitioner, approved of the patient's participation and will continue to take care of the patient per her usual protocol - participation in [employer] program will not change the patient's present or future medical care. Pt did not have any questions. She said she was glad to participate and that she was doing it for her mother who also had leukemia. Pt willingly and independently signed the ICF. A copy of the signed ICF and my business card will be given to pt with instructions to call with any questions that may arise or if she should change her mind regarding participation in [employer] program.

## 2018-05-25 NOTE — PROCEDURES
PROCEDURE NOTE:  Date of Procedure: 05/25/2018  Bone Marrow Biopsy and Aspiration  Indication: likely new AML  Consent: Informed consent was obtained from patient.  Timeout: Done and documented.  Position: Prone  Site: Left posterior illiac crest.  Prep: Betadine.  Needle used: 11 gauge Jamshidi needle.  Anesthetic: 2% lidocaine 13 cc.  Biopsy: The biopsy needle was introduced into the marrow cavity two times and an aspirate was obtained (without spicules seen the first time, and then again without many spicules seen the second time). Aspirate came out very slowly and clotted very quickly. This was sent for flow cytometry, FLT3, NPM1, CEBPA, NGS, AML FISH, and cytogenetics. In case aspirate was not adequate, numerous small core biopsies were obtained and sent for routine histologic examination as well as any additional testing not able to be performed with aspirate.  Complications: None.  Disposition: The patient was left in room with her RN and instructed to lay on her back for 20 minutes. Bandaid to be removed in 24 hours.   Blood loss: Minimal.     Norma Berg DNP, NP  Hematology/Oncology

## 2018-05-25 NOTE — ASSESSMENT & PLAN NOTE
- Admitted from OSH ([facility]) on 5/25/18 early AM with WBC around 100s  - Likely new AML  - Bone marrow biopsy and aspiration 5/25/18 to confirm diagnosis and risk category. Ordered routine labs in addition to FLT3, CEBPA, NPM1, NGS, and AML FISH  - Pt is an induction candidate. Will not screen for research trials. Will likely begin 7+3 chemo once pathology report in.   - Will monitor for tumor lysis syndrome once chemotherapy starts  - With elevated uric acid, on allopurinol 300 mg daily. Increased IVFs to NS at 200 ml/hr. May need rasburicase over the weekend   - WBCs elevated--continue hydrea 2 grams BID  - ECHO ordered 5/25/18, EF 65%  - PICC line placed 5/25/18, xray to confirm placement pending  - HIV and hepatitis panel done 5/25/18, negative  - CT head done at OSH for headaches, was negative for any acute process. Headache now resolved after prn pain medication  - Will perform lo res HLA typing on 5/26/18 and hi res on 5/27/18 in anticipation of possible need for future transplant   - Pt with two daughters, two full sisters (in their mid 60s, one with brain aneurysm hx, other one without any medical issues), and one full brother (59 y/o healthy)

## 2018-05-25 NOTE — ASSESSMENT & PLAN NOTE
- WBC around 100s  - daily CBC   - will need Bone marrow biopsy   - monitor for tumor lysis syndrome  - start on IVF   - CT head -ve for any acute process

## 2018-05-25 NOTE — SUBJECTIVE & OBJECTIVE
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 Pt is now on hydrea 2 grams BID, allopurinol 300 mg daily, and IVF. Pt may need rasburicase this weekend. She will undergo a BM bx and aspiration today. She is an induction candidate and will not be screened for research trials. She has anxiety for which she usually takes xanax, this was re-started.     Objective:     Vital Signs (Most Recent):  Temp: 98.3 °F (36.8 °C) (05/25/18 1153)  Pulse: 88 (05/25/18 1153)  Resp: 19 (05/25/18 1153)  BP: (!) 107/59 (05/25/18 1153)  SpO2: (!) 93 % (05/25/18 1153) Vital Signs (24h Range):  Temp:  [98 °F (36.7 °C)-99 °F (37.2 °C)] 98.3 °F (36.8 °C)  Pulse:  [76-88] 88  Resp:  [18-21] 19  SpO2:  [92 %-95 %] 93 %  BP: (107-123)/(53-59) 107/59     Weight: 75.7 kg (166 lb 12.5 oz)  Body mass index is 29.54 kg/m².  Body surface area is 1.83 meters squared.    ECOG SCORE         90% KPS    Intake/Output - Last 3 Shifts     None          Physical Exam   Constitutional: She is oriented to person, place, and time. Vital signs are normal. She is cooperative.   HENT:   Head: Normocephalic.   Eyes: Lids are normal.   Neck: Trachea normal and normal range of motion.   Cardiovascular: Normal rate, regular rhythm, S1 normal, S2 normal and normal heart sounds.    Pulmonary/Chest: Effort normal and breath sounds normal.   Abdominal: Soft. Normal appearance and bowel sounds are normal.   Musculoskeletal: Normal range of motion.   Lymphadenopathy:        Head (right side): No submental, no submandibular, no tonsillar, no preauricular, no posterior auricular and no occipital adenopathy present.        Head (left side): No submental, no submandibular, no tonsillar, no preauricular, no posterior auricular and no occipital adenopathy present.     She has no cervical adenopathy.        Right: No supraclavicular adenopathy present.        Left: No supraclavicular adenopathy present.   Neurological: She is alert and oriented to  person, place, and time. Coordination and gait normal.   Skin: Skin is dry and intact. She is not diaphoretic. No pallor.   RUE PICC line with drsg C/D/I  Various small maculopapular lesions/rash to BLE (shins, R more than L). Per pt, this is improving   Psychiatric: She has a normal mood and affect. Her speech is normal and behavior is normal. Judgment and thought content normal. Cognition and memory are normal.       Significant Labs:   CBC:   Recent Labs  Lab 05/25/18  0505   WBC 99.70*   HGB 7.5*   HCT 23.6*   *   , CMP:   Recent Labs  Lab 05/25/18  0011      K 3.0*      CO2 21*   *   BUN 10   CREATININE 1.4   CALCIUM 8.3*   PROT 6.9   ALBUMIN 3.2*   BILITOT 0.5   ALKPHOS 211*   AST 24   ALT 19   ANIONGAP 12   EGFRNONAA 38.9*    and Uric Acid   Recent Labs  Lab 05/25/18  0011   URICACID 10.3*       Diagnostic Results:  I have reviewed all pertinent imaging results/findings within the past 24 hours.   ECHO 5/25/18, ordered, not performed yet  Chest xray after PICC placement 5/25/18, in process

## 2018-05-25 NOTE — PROCEDURES
"Noreen Mac is a 68 y.o. female patient.    Temp: 98 °F (36.7 °C) (05/25/18 0758)  Pulse: 85 (05/25/18 0758)  Resp: 18 (05/25/18 0758)  BP: (!) 110/53 (05/25/18 0758)  SpO2: (!) 92 % (05/25/18 0758)  Weight: 75.7 kg (166 lb 12.5 oz) (05/25/18 0400)  Height: 5' 3" (160 cm) (05/24/18 9823)    PICC  Date/Time: 5/25/2018 10:27 AM  Performed by: DARIUS SHELTON  Consent Done: Yes  Time out: Immediately prior to procedure a time out was called to verify the correct patient, procedure, equipment, support staff and site/side marked as required  Indications: med administration and vascular access  Anesthesia: local infiltration  Local anesthetic: lidocaine 1% without epinephrine  Anesthetic Total (mL): 2  Preparation: skin prepped with ChloraPrep  Skin prep agent dried: skin prep agent completely dried prior to procedure  Sterile barriers: all five maximum sterile barriers used - cap, mask, sterile gown, sterile gloves, and large sterile sheet  Hand hygiene: hand hygiene performed prior to central venous catheter insertion  Location details: right basilic  Catheter type: double lumen  Catheter size: 5 Fr  Catheter Length: 32cm    Ultrasound guidance: yes  Vessel Caliber: medium and patent, compressibility normal  Vascular Doppler: not done  Needle advanced into vessel with real time Ultrasound guidance.  Guidewire confirmed in vessel.  Image recorded and saved.  Sterile sheath used.  Number of attempts: 1  Post-procedure: blood return through all ports, chlorhexidine patch and sterile dressing applied  Technical procedures used: 3cg  Specimens: No  Implants: No  Assessment: placement verified by x-ray  Complications: none          Sydnie Ha  5/25/2018  "

## 2018-05-25 NOTE — PROGRESS NOTES
Patient remains free from falls and injury this shift. Bed in low, locked position with call bell in reach. Family at bedside. Patient up ad gunnar. Bone marrow biopsy performed per NP. Site with no bleeding or complications. PICC line placed in R arm. No complications. Xanax given once for anxiety. All belongings within reach will continue to monitor.

## 2018-05-25 NOTE — H&P
[facility]  Hematology/Oncology  H&P    Patient Name: Noreen Mac  MRN: [id number]  Admission Date: 2018  Code Status: Full Code   Attending Provider: Josette Singh MD  Primary Care Physician: Primary Doctor No  Principal Problem:Acute leukemia    Subjective:     HPI: A 68 y.o female h/o connective tissue disease (not sure what exactly), depression and Osteoporosis who presented to OSH with a couple weeks h/o generalized malaise, myalgias and fatigue. He reports does not feel well and has no energy or appetite to eat. She complains of headache for couple days. Denies any vision changes or head trauma. Pt had lab works at an urgent clinic which showed elevate WBC (100s).     At OSH. He had a CT head WO contrast which showed no acute process. She did NOT have a bone marrow bx at OSH. She was transferred to [facility] for further evaluation and treatment.     During my interview, she reports a headache (3 out of 10). Denies any vision changes, chest pain, sob, fever, chills, cough or bleeding. She reports family hx of leukemia. Her mother  at age of 49 with leukemia. Denies smoking but drinks occasionally. No h/o previous cancer, radiation or chemotherapy.      Oncology Treatment Plan:   [No treatment plan]    Medications:  Continuous Infusions:   sodium chloride 0.9%       Scheduled Meds:   acyclovir  400 mg Oral BID    allopurinol  300 mg Oral Daily    ciprofloxacin HCl  500 mg Oral Q12H    hydroxyurea  2,000 mg Oral BID    ondansetron  4 mg Oral Once    potassium chloride  40 mEq Oral Once    sodium phosphate IVPB  15 mmol Intravenous Once     PRN Meds:     Review of patient's allergies indicates:   Allergen Reactions    Methotrexate analogues      Elevated Liver Enzyme    Bactrim [sulfamethoxazole-trimethoprim] Nausea And Vomiting and Rash        Past Medical History:   Diagnosis Date    Hypertension      Past Surgical History:   Procedure Laterality Date     CHOLECYSTECTOMY      HYSTERECTOMY      TONSILLECTOMY       Family History     None        Social History Main Topics    Smoking status: Former Smoker     Packs/day: 0.50     Years: 10.00     Types: Cigarettes     Quit date: 3/25/2017    Smokeless tobacco: Not on file    Alcohol use 0.6 oz/week     1 Shots of liquor per week    Drug use: No    Sexual activity: Yes     Partners: Female       Review of Systems   Constitutional: Positive for activity change and fatigue. Negative for chills and fever.   HENT: Negative for ear discharge, ear pain, rhinorrhea and sore throat.    Eyes: Negative for pain.   Respiratory: Negative for cough and shortness of breath.    Cardiovascular: Negative for chest pain and leg swelling.   Gastrointestinal: Negative for abdominal pain, constipation, diarrhea, nausea and vomiting.   Genitourinary: Negative for dysuria and hematuria.   Musculoskeletal: Negative for back pain and neck pain.   Skin: Positive for rash (bilateral anterior shin  ).   Neurological: Positive for headaches (3 out of 10 ). Negative for dizziness, weakness and numbness.     Objective:     Vital Signs (Most Recent):  Temp: 98.2 °F (36.8 °C) (05/24/18 2343)  Pulse: 76 (05/24/18 2343)  Resp: 18 (05/24/18 2343)  BP: (!) 116/55 (05/24/18 2343)  SpO2: (!) 93 % (05/24/18 2343) Vital Signs (24h Range):  Temp:  [98.2 °F (36.8 °C)-99 °F (37.2 °C)] 98.2 °F (36.8 °C)  Pulse:  [76-79] 76  Resp:  [18-21] 18  SpO2:  [93 %-95 %] 93 %  BP: (116-123)/(55-58) 116/55     Weight: 78.5 kg (173 lb)  Body mass index is 30.65 kg/m².  Body surface area is 1.87 meters squared.    No intake or output data in the 24 hours ending 05/25/18 0204    Physical Exam  General: Well developed, well nourished female in NAD  HEENT: Conjunctiva clepalear; Oropharynx clear  Neck: No JVD noted, Supple. No lymphadenopathy  CV: Normal S1, S2 with systolic murmurs.  Resp: Lungs CTA Bilaterally, Unlabored  Abdomen: NTND, BS normoactive x4 quads, soft. No  hepatosplenomegaly  +RUQ scar.   Extrem: No cyanosis, clubbing, edema.  Skin: + bilateral anterior shin rashes. No lesions or ulcers  Neuro: motor strength in tact. No focal deficit   PSYCH: Oriented x3    Significant Labs:   CBC:     Recent Labs  Lab 05/25/18  0505   WBC 99.70*   RBC 2.34*   HGB 7.5*   HCT 23.6*   *   *   MCH 32.1*   MCHC 31.8*          CMP:     Recent Labs  Lab 05/25/18  0011      K 3.0*      CO2 21*   *   BUN 10   CREATININE 1.4   CALCIUM 8.3*   PROT 6.9   ALBUMIN 3.2*   BILITOT 0.5   ALKPHOS 211*   AST 24   ALT 19   ANIONGAP 12   EGFRNONAA 38.9*       Diagnostic Results:  CT: Head WO contrast at outside hospital showed no acut process   CT head report and CD in the chart     Assessment/Plan:     * Acute leukemia    - WBC around 100s  - daily CBC   - will need Bone marrow biopsy   - monitor for tumor lysis syndrome  - start on IVF   - CT head -ve for any acute process           Hypophosphatemia    - replete as needed  - daily phos           Hypokalemia    - replete as needed  - daily CMP          GERD (gastroesophageal reflux disease)    - continue on home Omeprazole   - no issue         Osteoporosis    - takes denosumab infection once every 6 months  - no acute issue         Depression    - continue home Escitalopram   - no acute issue             Moriah Guillen MD  Hematology/Oncology  [facility]

## 2018-05-25 NOTE — PLAN OF CARE
MDR's with Dr Troy.  Patient was transferred from [facility] for work up and tx of suspected AML.  BMB, PICC and echo ordered.  Plan to start induction early next week once path is finalized.  The patient has been updated on the plan of care and ELOS.  Will continue to follow.

## 2018-05-26 LAB
ALBUMIN SERPL BCP-MCNC: 2.8 G/DL
ALP SERPL-CCNC: 192 U/L
ALT SERPL W/O P-5'-P-CCNC: 16 U/L
ANION GAP SERPL CALC-SCNC: 11 MMOL/L
ANISOCYTOSIS BLD QL SMEAR: SLIGHT
AST SERPL-CCNC: 30 U/L
BASOPHILS NFR BLD: 0 %
BILIRUB SERPL-MCNC: 0.6 MG/DL
BLASTS NFR BLD MANUAL: 20 %
BLD PROD TYP BPU: NORMAL
BLOOD UNIT EXPIRATION DATE: NORMAL
BLOOD UNIT TYPE CODE: 6200
BLOOD UNIT TYPE: NORMAL
BUN SERPL-MCNC: 17 MG/DL
CALCIUM SERPL-MCNC: 7 MG/DL
CHLORIDE SERPL-SCNC: 110 MMOL/L
CO2 SERPL-SCNC: 20 MMOL/L
CODING SYSTEM: NORMAL
CREAT SERPL-MCNC: 1.1 MG/DL
DIFFERENTIAL METHOD: ABNORMAL
DISPENSE STATUS: NORMAL
EOSINOPHIL NFR BLD: 3 %
ERYTHROCYTE [DISTWIDTH] IN BLOOD BY AUTOMATED COUNT: 17.6 %
EST. GFR  (AFRICAN AMERICAN): >60 ML/MIN/1.73 M^2
EST. GFR  (NON AFRICAN AMERICAN): 52.1 ML/MIN/1.73 M^2
GLUCOSE SERPL-MCNC: 96 MG/DL
HCT VFR BLD AUTO: 19.8 %
HGB BLD-MCNC: 6.5 G/DL
HYPOCHROMIA BLD QL SMEAR: ABNORMAL
IMM GRANULOCYTES # BLD AUTO: ABNORMAL K/UL
IMM GRANULOCYTES NFR BLD AUTO: ABNORMAL %
LYMPHOCYTES NFR BLD: 12 %
MAGNESIUM SERPL-MCNC: 1.2 MG/DL
MCH RBC QN AUTO: 32.2 PG
MCHC RBC AUTO-ENTMCNC: 32.8 G/DL
MCV RBC AUTO: 98 FL
MONOCYTES NFR BLD: 58 %
NEUTROPHILS NFR BLD: 7 %
NRBC BLD-RTO: 0 /100 WBC
NUM UNITS TRANS PACKED RBC: NORMAL
OVALOCYTES BLD QL SMEAR: ABNORMAL
PHOSPHATE SERPL-MCNC: 4.9 MG/DL
PLATELET # BLD AUTO: 95 K/UL
PLATELET BLD QL SMEAR: ABNORMAL
PMV BLD AUTO: 9.8 FL
POIKILOCYTOSIS BLD QL SMEAR: SLIGHT
POLYCHROMASIA BLD QL SMEAR: ABNORMAL
POTASSIUM SERPL-SCNC: 3.8 MMOL/L
PROT SERPL-MCNC: 5.8 G/DL
RBC # BLD AUTO: 2.02 M/UL
SODIUM SERPL-SCNC: 141 MMOL/L
WBC # BLD AUTO: 47.9 K/UL

## 2018-05-26 PROCEDURE — 20600001 HC STEP DOWN PRIVATE ROOM

## 2018-05-26 PROCEDURE — 36430 TRANSFUSION BLD/BLD COMPNT: CPT

## 2018-05-26 PROCEDURE — 25000003 PHARM REV CODE 250: Performed by: HOSPITALIST

## 2018-05-26 PROCEDURE — 25000003 PHARM REV CODE 250: Performed by: NURSE PRACTITIONER

## 2018-05-26 PROCEDURE — 63600175 PHARM REV CODE 636 W HCPCS: Performed by: HOSPITALIST

## 2018-05-26 PROCEDURE — 83735 ASSAY OF MAGNESIUM: CPT

## 2018-05-26 PROCEDURE — 84100 ASSAY OF PHOSPHORUS: CPT

## 2018-05-26 PROCEDURE — 99233 SBSQ HOSP IP/OBS HIGH 50: CPT | Mod: GC,,, | Performed by: INTERNAL MEDICINE

## 2018-05-26 PROCEDURE — P9040 RBC LEUKOREDUCED IRRADIATED: HCPCS

## 2018-05-26 PROCEDURE — A4216 STERILE WATER/SALINE, 10 ML: HCPCS | Performed by: INTERNAL MEDICINE

## 2018-05-26 PROCEDURE — 25000003 PHARM REV CODE 250: Performed by: STUDENT IN AN ORGANIZED HEALTH CARE EDUCATION/TRAINING PROGRAM

## 2018-05-26 PROCEDURE — 63600175 PHARM REV CODE 636 W HCPCS: Performed by: INTERNAL MEDICINE

## 2018-05-26 PROCEDURE — 25000003 PHARM REV CODE 250: Performed by: INTERNAL MEDICINE

## 2018-05-26 PROCEDURE — 85025 COMPLETE CBC W/AUTO DIFF WBC: CPT

## 2018-05-26 PROCEDURE — 86644 CMV ANTIBODY: CPT

## 2018-05-26 PROCEDURE — 80053 COMPREHEN METABOLIC PANEL: CPT

## 2018-05-26 RX ORDER — HYDROCODONE BITARTRATE AND ACETAMINOPHEN 500; 5 MG/1; MG/1
TABLET ORAL
Status: DISCONTINUED | OUTPATIENT
Start: 2018-05-26 | End: 2018-05-27

## 2018-05-26 RX ORDER — RAMELTEON 8 MG/1
8 TABLET ORAL ONCE
Status: COMPLETED | OUTPATIENT
Start: 2018-05-26 | End: 2018-05-26

## 2018-05-26 RX ORDER — MAGNESIUM SULFATE HEPTAHYDRATE 40 MG/ML
2 INJECTION, SOLUTION INTRAVENOUS
Status: COMPLETED | OUTPATIENT
Start: 2018-05-26 | End: 2018-05-26

## 2018-05-26 RX ORDER — CALCIUM CARBONATE 200(500)MG
500 TABLET,CHEWABLE ORAL 3 TIMES DAILY
Status: DISCONTINUED | OUTPATIENT
Start: 2018-05-26 | End: 2018-05-28

## 2018-05-26 RX ORDER — DIPHENHYDRAMINE HCL 25 MG
25 CAPSULE ORAL ONCE
Status: COMPLETED | OUTPATIENT
Start: 2018-05-26 | End: 2018-05-26

## 2018-05-26 RX ADMIN — CIPROFLOXACIN HYDROCHLORIDE 500 MG: 500 TABLET, FILM COATED ORAL at 08:05

## 2018-05-26 RX ADMIN — ACETAMINOPHEN 650 MG: 325 TABLET, FILM COATED ORAL at 05:05

## 2018-05-26 RX ADMIN — SODIUM CHLORIDE: 0.9 INJECTION, SOLUTION INTRAVENOUS at 06:05

## 2018-05-26 RX ADMIN — Medication 10 ML: at 08:05

## 2018-05-26 RX ADMIN — ALPRAZOLAM 0.25 MG: 0.25 TABLET ORAL at 05:05

## 2018-05-26 RX ADMIN — MAGNESIUM SULFATE IN WATER 2 G: 40 INJECTION, SOLUTION INTRAVENOUS at 08:05

## 2018-05-26 RX ADMIN — CALCIUM CARBONATE (ANTACID) CHEW TAB 500 MG 500 MG: 500 CHEW TAB at 02:05

## 2018-05-26 RX ADMIN — Medication 10 ML: at 05:05

## 2018-05-26 RX ADMIN — ALLOPURINOL 300 MG: 300 TABLET ORAL at 08:05

## 2018-05-26 RX ADMIN — HYDROXYUREA 2000 MG: 500 CAPSULE ORAL at 08:05

## 2018-05-26 RX ADMIN — FAMOTIDINE 20 MG: 20 TABLET ORAL at 08:05

## 2018-05-26 RX ADMIN — SODIUM CHLORIDE: 0.9 INJECTION, SOLUTION INTRAVENOUS at 10:05

## 2018-05-26 RX ADMIN — Medication 10 ML: at 06:05

## 2018-05-26 RX ADMIN — ACYCLOVIR 400 MG: 200 CAPSULE ORAL at 08:05

## 2018-05-26 RX ADMIN — CALCIUM GLUCONATE 1000 MG: 94 INJECTION, SOLUTION INTRAVENOUS at 12:05

## 2018-05-26 RX ADMIN — ENOXAPARIN SODIUM 40 MG: 100 INJECTION SUBCUTANEOUS at 04:05

## 2018-05-26 RX ADMIN — ESCITALOPRAM 10 MG: 5 TABLET, FILM COATED ORAL at 08:05

## 2018-05-26 RX ADMIN — RAMELTEON 8 MG: 8 TABLET, FILM COATED ORAL at 08:05

## 2018-05-26 RX ADMIN — CALCIUM CARBONATE (ANTACID) CHEW TAB 500 MG 500 MG: 500 CHEW TAB at 08:05

## 2018-05-26 RX ADMIN — SODIUM CHLORIDE: 0.9 INJECTION, SOLUTION INTRAVENOUS at 02:05

## 2018-05-26 RX ADMIN — MAGNESIUM SULFATE IN WATER 2 G: 40 INJECTION, SOLUTION INTRAVENOUS at 10:05

## 2018-05-26 RX ADMIN — Medication 10 ML: at 12:05

## 2018-05-26 RX ADMIN — ONDANSETRON 4 MG: 4 TABLET, ORALLY DISINTEGRATING ORAL at 02:05

## 2018-05-26 RX ADMIN — ALPRAZOLAM 0.25 MG: 0.25 TABLET ORAL at 02:05

## 2018-05-26 RX ADMIN — DIPHENHYDRAMINE HYDROCHLORIDE 25 MG: 25 CAPSULE ORAL at 05:05

## 2018-05-26 RX ADMIN — Medication 10 ML: at 01:05

## 2018-05-26 NOTE — ASSESSMENT & PLAN NOTE
- continue home Escitalopram   - no acute issue   - with associated anxiety--takes xanax at home but unaware of dose. Started on xanax 0.25 mg bid prn, can increase if needed

## 2018-05-26 NOTE — PROGRESS NOTES
[facility]  Hematology  Bone Marrow Transplant  Progress Note    Patient Name: Noreen Mac  Admission Date: 5/24/2018  Hospital Length of Stay: 2 days  Code Status: Full Code    Subjective:     Interval History:   No acute events. PICC placed. Reports she slept well last night. Anxiety improved with prn xanax.     Objective:     Vital Signs (Most Recent):  Temp: 98.9 °F (37.2 °C) (05/26/18 0740)  Pulse: 88 (05/26/18 0740)  Resp: 18 (05/26/18 0740)  BP: (!) 120/59 (05/26/18 0740)  SpO2: 95 % (05/26/18 0740) Vital Signs (24h Range):  Temp:  [98.2 °F (36.8 °C)-99 °F (37.2 °C)] 98.9 °F (37.2 °C)  Pulse:  [] 88  Resp:  [18-19] 18  SpO2:  [93 %-95 %] 95 %  BP: (102-124)/(51-60) 120/59     Weight: 75.7 kg (166 lb 12.5 oz)  Body mass index is 29.54 kg/m².  Body surface area is 1.83 meters squared.    ECOG SCORE         90% KPS    Intake/Output - Last 3 Shifts       05/24 0700 - 05/25 0659 05/25 0700 - 05/26 0659 05/26 0700 - 05/27 0659    P.O.  1300     I.V. (mL/kg)  1800 (23.8)     Blood   350    Total Intake(mL/kg)  3100 (41) 350 (4.6)    Net   +3100 +350           Urine Occurrence  11 x           Physical Exam   Constitutional: She is oriented to person, place, and time. Vital signs are normal. She is cooperative. No distress.   HENT:   Head: Normocephalic.   Eyes: Conjunctivae and EOM are normal.   Neck: Trachea normal and normal range of motion. Neck supple.   Cardiovascular: Normal rate, regular rhythm, S1 normal, S2 normal and normal heart sounds.    Pulmonary/Chest: Effort normal and breath sounds normal.   Abdominal: Soft. Normal appearance and bowel sounds are normal.   Musculoskeletal: Normal range of motion.   Neurological: She is alert and oriented to person, place, and time. Coordination and gait normal.   Skin: Skin is dry and intact. She is not diaphoretic. No pallor.   RUE PICC line with drsg C/D/I  Various small maculopapular lesions/rash to BLE (shins, R more than L). Per  pt, this is improving   Psychiatric: She has a normal mood and affect. Her speech is normal and behavior is normal. Cognition and memory are normal.     Significant Labs:   CBC:     Recent Labs  Lab 05/25/18  0505 05/26/18  0306   WBC 99.70* 47.90*   HGB 7.5* 6.5*   HCT 23.6* 19.8*   * 95*   , CMP:     Recent Labs  Lab 05/25/18  0011 05/26/18  0306    141   K 3.0* 3.8    110   CO2 21* 20*   * 96   BUN 10 17   CREATININE 1.4 1.1   CALCIUM 8.3* 7.0*   PROT 6.9 5.8*   ALBUMIN 3.2* 2.8*   BILITOT 0.5 0.6   ALKPHOS 211* 192*   AST 24 30   ALT 19 16   ANIONGAP 12 11   EGFRNONAA 38.9* 52.1*    and Uric Acid     Recent Labs  Lab 05/25/18  0011 05/25/18  1518   URICACID 10.3* 7.7*       Diagnostic Results:  I have reviewed all pertinent imaging results/findings within the past 24 hours.   ECHO 5/25/18, ordered, EF 65%  Chest xray after PICC placement 5/25/18, in process      Assessment/Plan:     * AML (acute myeloblastic leukemia)    - Admitted from OSH ([facility]) on 5/25/18 early AM with WBC around 100s  - Likely new AML  - Bone marrow biopsy and aspiration 5/25/18 to confirm diagnosis and risk category. Ordered routine labs in addition to FLT3, CEBPA, NPM1, NGS, and AML FISH  - Pt is an induction candidate. Will not screen for research trials. Will likely begin 7+3 chemo once pathology report in.   - Will monitor for tumor lysis syndrome once chemotherapy starts  - With elevated uric acid, on allopurinol 300 mg daily. Increased IVFs to NS at 200 ml/hr. May need rasburicase over the weekend   - WBCs elevated--continue hydrea 2 grams BID  - ECHO ordered 5/25/18, EF 65%  - PICC line placed 5/25/18, xray to confirm placement pending  - HIV and hepatitis panel done 5/25/18, negative  - CT head done at OSH for headaches, was negative for any acute process. Headache now resolved after prn pain medication  - Will perform lo res HLA typing on 5/26/18 and hi res on 5/27/18 in anticipation of possible  need for future transplant   - Pt with two daughters, two full sisters (in their mid 60s, one with brain aneurysm hx, other one without any medical issues), and one full brother (61 y/o healthy)         Rash    - rash to BLE (shins)  - states it occurred mid April 2018 after 3rd dose of prolia  - possible that rash is from AML  - improving on own, will defer bx at this time and continue to monitor        HTN (hypertension)    - takes metoprolol at home  - will hold at this time as HR/BP are controlled; plan to restart if HR/BP rise  - continue to monitor        Electrolyte abnormality    - will hold off on BMT PRN electrolyte replacement protocol due to possible TLS risk in future with start of chemotherapy   - will replace electrolytes as needed day by day  - today: with hypokalemia and hypophosphatemia, was replaced        GERD (gastroesophageal reflux disease)    - takes Omeprazole at home, now on pepcid here  - no issue         Osteoporosis    - takes prolia once every 6 months, last given mid April 2018   - no acute issue         Depression    - continue home Escitalopram   - no acute issue   - with associated anxiety--takes xanax at home but unaware of dose. Started on xanax 0.25 mg bid prn, can increase if needed            VTE Risk Mitigation         Ordered     enoxaparin injection 40 mg  Daily      05/25/18 0553     Place MAYLIN hose  Until discontinued      05/25/18 0032     IP VTE HIGH RISK PATIENT  Once      05/25/18 0032          Disposition: pending    Kirby Van IV, MD  Bone Marrow Transplant  [facility]

## 2018-05-26 NOTE — PLAN OF CARE
Problem: Patient Care Overview  Goal: Plan of Care Review  Outcome: Ongoing (interventions implemented as appropriate)  Patient AAOx4, VSS, afebrile, and without injury. Fall precautions maintained. Patient instructed on how to contact the nurse. Patient on room air without distress; patient on regular diet with moderate appetite. No complaints of nausea or pain. Complaints of anxiety addressed with PRN xanax.  and daughter currently at bedside. Patient received one unit of PRBCs this morning; tolerated well. Patient received calcium and magnesium IVPB. NS continued at 200 ml/hr. PIV removed due to redness and swelling at the site. Site circled to monitor for changes. Heating pack applied. Patient up ad gunnar to the bathroom. Questions and concerns have been addressed; will continue to monitor.

## 2018-05-26 NOTE — SUBJECTIVE & OBJECTIVE
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 Pt is now on hydrea 2 grams BID, allopurinol 300 mg daily, and IVF. Pt may need rasburicase this weekend. She will undergo a BM bx and aspiration today. She is an induction candidate and will not be screened for research trials. She has anxiety for which she usually takes xanax, this was re-started.     Objective:     Vital Signs (Most Recent):  Temp: 98.9 °F (37.2 °C) (05/26/18 0740)  Pulse: 88 (05/26/18 0740)  Resp: 18 (05/26/18 0740)  BP: (!) 120/59 (05/26/18 0740)  SpO2: 95 % (05/26/18 0740) Vital Signs (24h Range):  Temp:  [98.2 °F (36.8 °C)-99 °F (37.2 °C)] 98.9 °F (37.2 °C)  Pulse:  [] 88  Resp:  [18-19] 18  SpO2:  [93 %-95 %] 95 %  BP: (102-124)/(51-60) 120/59     Weight: 75.7 kg (166 lb 12.5 oz)  Body mass index is 29.54 kg/m².  Body surface area is 1.83 meters squared.    ECOG SCORE         90% KPS    Intake/Output - Last 3 Shifts       05/24 0700 - 05/25 0659 05/25 0700 - 05/26 0659 05/26 0700 - 05/27 0659    P.O.  1300     I.V. (mL/kg)  1800 (23.8)     Blood   350    Total Intake(mL/kg)  3100 (41) 350 (4.6)    Net   +3100 +350           Urine Occurrence  11 x           Physical Exam   Constitutional: She is oriented to person, place, and time. Vital signs are normal. She is cooperative. No distress.   HENT:   Head: Normocephalic.   Eyes: Conjunctivae and EOM are normal.   Neck: Trachea normal and normal range of motion. Neck supple.   Cardiovascular: Normal rate, regular rhythm, S1 normal, S2 normal and normal heart sounds.    Pulmonary/Chest: Effort normal and breath sounds normal.   Abdominal: Soft. Normal appearance and bowel sounds are normal.   Musculoskeletal: Normal range of motion.   Neurological: She is alert and oriented to person, place, and time. Coordination and gait normal.   Skin: Skin is dry and intact. She is not diaphoretic. No pallor.   RUE PICC line with drsg C/D/I  Various small maculopapular  lesions/rash to BLE (shins, R more than L). Per pt, this is improving   Psychiatric: She has a normal mood and affect. Her speech is normal and behavior is normal. Cognition and memory are normal.     Significant Labs:   CBC:     Recent Labs  Lab 05/25/18  0505 05/26/18  0306   WBC 99.70* 47.90*   HGB 7.5* 6.5*   HCT 23.6* 19.8*   * 95*   , CMP:     Recent Labs  Lab 05/25/18  0011 05/26/18  0306    141   K 3.0* 3.8    110   CO2 21* 20*   * 96   BUN 10 17   CREATININE 1.4 1.1   CALCIUM 8.3* 7.0*   PROT 6.9 5.8*   ALBUMIN 3.2* 2.8*   BILITOT 0.5 0.6   ALKPHOS 211* 192*   AST 24 30   ALT 19 16   ANIONGAP 12 11   EGFRNONAA 38.9* 52.1*    and Uric Acid     Recent Labs  Lab 05/25/18  0011 05/25/18  1518   URICACID 10.3* 7.7*       Diagnostic Results:  I have reviewed all pertinent imaging results/findings within the past 24 hours.   ECHO 5/25/18, ordered, EF 65%  Chest xray after PICC placement 5/25/18, in process

## 2018-05-27 LAB
ALBUMIN SERPL BCP-MCNC: 2.6 G/DL
ALP SERPL-CCNC: 166 U/L
ALT SERPL W/O P-5'-P-CCNC: 13 U/L
ANION GAP SERPL CALC-SCNC: 7 MMOL/L
ANISOCYTOSIS BLD QL SMEAR: SLIGHT
AST SERPL-CCNC: 20 U/L
BASOPHILS # BLD AUTO: 0.01 K/UL
BASOPHILS NFR BLD: 0.1 %
BILIRUB SERPL-MCNC: 0.7 MG/DL
BLD PROD TYP BPU: NORMAL
BLOOD UNIT EXPIRATION DATE: NORMAL
BLOOD UNIT TYPE CODE: 6200
BLOOD UNIT TYPE: NORMAL
BUN SERPL-MCNC: 18 MG/DL
CALCIUM SERPL-MCNC: 7.3 MG/DL
CHLORIDE SERPL-SCNC: 110 MMOL/L
CO2 SERPL-SCNC: 22 MMOL/L
CODING SYSTEM: NORMAL
CREAT SERPL-MCNC: 0.8 MG/DL
DIFFERENTIAL METHOD: ABNORMAL
DISPENSE STATUS: NORMAL
EOSINOPHIL # BLD AUTO: 0 K/UL
EOSINOPHIL NFR BLD: 0.2 %
ERYTHROCYTE [DISTWIDTH] IN BLOOD BY AUTOMATED COUNT: 16.9 %
EST. GFR  (AFRICAN AMERICAN): >60 ML/MIN/1.73 M^2
EST. GFR  (NON AFRICAN AMERICAN): >60 ML/MIN/1.73 M^2
GLUCOSE SERPL-MCNC: 111 MG/DL
HCT VFR BLD AUTO: 19.3 %
HGB BLD-MCNC: 6.6 G/DL
HYPOCHROMIA BLD QL SMEAR: ABNORMAL
IMM GRANULOCYTES # BLD AUTO: 0.35 K/UL
IMM GRANULOCYTES NFR BLD AUTO: 2.9 %
LYMPHOCYTES # BLD AUTO: 1.8 K/UL
LYMPHOCYTES NFR BLD: 15.1 %
MAGNESIUM SERPL-MCNC: 1.7 MG/DL
MCH RBC QN AUTO: 32.8 PG
MCHC RBC AUTO-ENTMCNC: 34.2 G/DL
MCV RBC AUTO: 96 FL
MONOCYTES # BLD AUTO: 8.8 K/UL
MONOCYTES NFR BLD: 72.2 %
NEUTROPHILS # BLD AUTO: 1.2 K/UL
NEUTROPHILS NFR BLD: 9.5 %
NRBC BLD-RTO: 0 /100 WBC
NUM UNITS TRANS PACKED RBC: NORMAL
OVALOCYTES BLD QL SMEAR: ABNORMAL
PHOSPHATE SERPL-MCNC: 4.3 MG/DL
PLATELET # BLD AUTO: 43 K/UL
PLATELET BLD QL SMEAR: ABNORMAL
PMV BLD AUTO: 11 FL
POIKILOCYTOSIS BLD QL SMEAR: SLIGHT
POLYCHROMASIA BLD QL SMEAR: ABNORMAL
POTASSIUM SERPL-SCNC: 3.9 MMOL/L
PROT SERPL-MCNC: 5.5 G/DL
RBC # BLD AUTO: 2.01 M/UL
SODIUM SERPL-SCNC: 139 MMOL/L
URATE SERPL-MCNC: 4.8 MG/DL
WBC # BLD AUTO: 12.22 K/UL

## 2018-05-27 PROCEDURE — 81382 HLA II TYPING 1 LOC HR: CPT | Mod: 91

## 2018-05-27 PROCEDURE — A4216 STERILE WATER/SALINE, 10 ML: HCPCS | Performed by: INTERNAL MEDICINE

## 2018-05-27 PROCEDURE — 86644 CMV ANTIBODY: CPT

## 2018-05-27 PROCEDURE — P9040 RBC LEUKOREDUCED IRRADIATED: HCPCS

## 2018-05-27 PROCEDURE — 81380 HLA I TYPING 1 LOCUS HR: CPT

## 2018-05-27 PROCEDURE — 81380 HLA I TYPING 1 LOCUS HR: CPT | Mod: 91

## 2018-05-27 PROCEDURE — 25000003 PHARM REV CODE 250: Performed by: STUDENT IN AN ORGANIZED HEALTH CARE EDUCATION/TRAINING PROGRAM

## 2018-05-27 PROCEDURE — 99233 SBSQ HOSP IP/OBS HIGH 50: CPT | Mod: GC,,, | Performed by: INTERNAL MEDICINE

## 2018-05-27 PROCEDURE — 25000003 PHARM REV CODE 250: Performed by: INTERNAL MEDICINE

## 2018-05-27 PROCEDURE — 81382 HLA II TYPING 1 LOC HR: CPT

## 2018-05-27 PROCEDURE — 20600001 HC STEP DOWN PRIVATE ROOM

## 2018-05-27 PROCEDURE — 80053 COMPREHEN METABOLIC PANEL: CPT

## 2018-05-27 PROCEDURE — 85025 COMPLETE CBC W/AUTO DIFF WBC: CPT

## 2018-05-27 PROCEDURE — 83735 ASSAY OF MAGNESIUM: CPT

## 2018-05-27 PROCEDURE — 25000003 PHARM REV CODE 250: Performed by: HOSPITALIST

## 2018-05-27 PROCEDURE — 25000003 PHARM REV CODE 250: Performed by: NURSE PRACTITIONER

## 2018-05-27 PROCEDURE — 84100 ASSAY OF PHOSPHORUS: CPT

## 2018-05-27 PROCEDURE — 84550 ASSAY OF BLOOD/URIC ACID: CPT

## 2018-05-27 PROCEDURE — 36430 TRANSFUSION BLD/BLD COMPNT: CPT

## 2018-05-27 RX ORDER — HYDROXYUREA 500 MG/1
1000 CAPSULE ORAL 2 TIMES DAILY
Status: DISCONTINUED | OUTPATIENT
Start: 2018-05-27 | End: 2018-05-28

## 2018-05-27 RX ORDER — HYDROCODONE BITARTRATE AND ACETAMINOPHEN 500; 5 MG/1; MG/1
TABLET ORAL
Status: DISCONTINUED | OUTPATIENT
Start: 2018-05-27 | End: 2018-05-28

## 2018-05-27 RX ORDER — ALPRAZOLAM 0.25 MG/1
0.25 TABLET ORAL NIGHTLY PRN
Status: DISCONTINUED | OUTPATIENT
Start: 2018-05-27 | End: 2018-05-29

## 2018-05-27 RX ADMIN — ALPRAZOLAM 0.25 MG: 0.25 TABLET ORAL at 08:05

## 2018-05-27 RX ADMIN — ALPRAZOLAM 0.25 MG: 0.25 TABLET ORAL at 11:05

## 2018-05-27 RX ADMIN — ACYCLOVIR 400 MG: 200 CAPSULE ORAL at 08:05

## 2018-05-27 RX ADMIN — Medication 10 ML: at 08:05

## 2018-05-27 RX ADMIN — HYDROXYUREA 2000 MG: 500 CAPSULE ORAL at 08:05

## 2018-05-27 RX ADMIN — ALPRAZOLAM 0.25 MG: 0.25 TABLET ORAL at 04:05

## 2018-05-27 RX ADMIN — ALPRAZOLAM 0.25 MG: 0.25 TABLET ORAL at 01:05

## 2018-05-27 RX ADMIN — CIPROFLOXACIN HYDROCHLORIDE 500 MG: 500 TABLET, FILM COATED ORAL at 08:05

## 2018-05-27 RX ADMIN — HYDROXYUREA 1000 MG: 500 CAPSULE ORAL at 08:05

## 2018-05-27 RX ADMIN — CALCIUM CARBONATE (ANTACID) CHEW TAB 500 MG 500 MG: 500 CHEW TAB at 08:05

## 2018-05-27 RX ADMIN — ALLOPURINOL 300 MG: 300 TABLET ORAL at 08:05

## 2018-05-27 RX ADMIN — CALCIUM CARBONATE (ANTACID) CHEW TAB 500 MG 500 MG: 500 CHEW TAB at 02:05

## 2018-05-27 RX ADMIN — Medication 10 ML: at 04:05

## 2018-05-27 RX ADMIN — Medication 10 ML: at 12:05

## 2018-05-27 RX ADMIN — CALCIUM GLUCONATE 1000 MG: 98 INJECTION, SOLUTION INTRAVENOUS at 07:05

## 2018-05-27 RX ADMIN — SODIUM CHLORIDE: 0.9 INJECTION, SOLUTION INTRAVENOUS at 12:05

## 2018-05-27 RX ADMIN — ESCITALOPRAM 10 MG: 5 TABLET, FILM COATED ORAL at 08:05

## 2018-05-27 RX ADMIN — SODIUM CHLORIDE: 0.9 INJECTION, SOLUTION INTRAVENOUS at 04:05

## 2018-05-27 RX ADMIN — ACETAMINOPHEN 650 MG: 325 TABLET, FILM COATED ORAL at 11:05

## 2018-05-27 RX ADMIN — SODIUM CHLORIDE: 0.9 INJECTION, SOLUTION INTRAVENOUS at 05:05

## 2018-05-27 RX ADMIN — FAMOTIDINE 20 MG: 20 TABLET ORAL at 08:05

## 2018-05-27 NOTE — PLAN OF CARE
Problem: Patient Care Overview  Goal: Plan of Care Review  Outcome: Ongoing (interventions implemented as appropriate)  Plan of care reviewed with pt. Pt voiced understanding. Pt AAO X 3. Pt c/o anxiety and difficulty sleeping during the night. Pt able to get a few hours of sleep after receiving ramelteon. No apparent distress noted. Pt was advised she would have to get an unit of blood this morning due to having critical labs. Bed in lowest position, locked, call light within reach. Side rails up x's 2 with slip resistant socks on.     Problem: Fall Risk (Adult)  Goal: Identify Related Risk Factors and Signs and Symptoms  Related risk factors and signs and symptoms are identified upon initiation of Human Response Clinical Practice Guideline (CPG)   Outcome: Ongoing (interventions implemented as appropriate)   05/27/18 0534   Fall Risk   Related Risk Factors (Fall Risk) polypharmacy;environment unfamiliar   Signs and Symptoms (Fall Risk) presence of risk factors     Goal: Absence of Falls  Patient will demonstrate the desired outcomes by discharge/transition of care.   Outcome: Ongoing (interventions implemented as appropriate)   05/27/18 0534   Fall Risk (Adult)   Absence of Falls making progress toward outcome   Pt without fall or injury during the shift    Problem: Infection, Risk/Actual (Adult)  Goal: Identify Related Risk Factors and Signs and Symptoms  Related risk factors and signs and symptoms are identified upon initiation of Human Response Clinical Practice Guideline (CPG)   Outcome: Ongoing (interventions implemented as appropriate)   05/27/18 0534   Infection, Risk/Actual   Related Risk Factors (Infection, Risk/Actual) immunosuppressed;treatment plan;chronic illness/condition     Goal: Infection Prevention/Resolution  Patient will demonstrate the desired outcomes by discharge/transition of care.   Outcome: Ongoing (interventions implemented as appropriate)   05/27/18 0534   Infection, Risk/Actual (Adult)    Infection Prevention/Resolution making progress toward outcome       Problem: Anxiety (Adult)  Goal: Identify Related Risk Factors and Signs and Symptoms  Related risk factors and signs and symptoms are identified upon initiation of Human Response Clinical Practice Guideline (CPG)   Outcome: Ongoing (interventions implemented as appropriate)   05/27/18 0534   Anxiety   Related Risk Factors (Anxiety) prognosis/treatment plan   Signs and Symptoms (Anxiety) concentration decreased;physical complaints/symptoms;sleep disturbance     Goal: Reduction/Resolution  Patient will demonstrate the desired outcomes by discharge/transition of care.   Outcome: Ongoing (interventions implemented as appropriate)   05/27/18 0534   Anxiety (Adult)   Reduction/Resolution making progress toward outcome

## 2018-05-27 NOTE — PLAN OF CARE
Problem: Patient Care Overview  Goal: Plan of Care Review  Outcome: Ongoing (interventions implemented as appropriate)  Patient AAOx4, VSS, afebrile, and without injury. Fall precautions maintained. Patient instructed on how to contact the nurse. Patient on room air without distress; patient on regular diet with moderate appetite. No complaints of nausea or pain. Complaints of anxiety addressed with PRN xanax. Daughter at bedside. Patient received one unit of PRBCs this morning; tolerated well. Patient received calcium IVPB. NS continued at 200 ml/hr. Patient up ad gunnar to the bathroom. Questions and concerns have been addressed; will continue to monitor.

## 2018-05-27 NOTE — SUBJECTIVE & OBJECTIVE
Subjective:     Interval History:   No complaints this morning other than some anxiety last night and difficulty sleeping. Currently states she is doing well.     Current ROS  Gen: No fevers, chills  Eyes: No vision changes, discharge  ENT: No rhinorrhea, congestion  CV: No CP, palpitations  Pulm: No SOB, cough  GI: No abd pain, nausea, vomiting  Neuro: No HA, paresthesias    Objective:     Vital Signs (Most Recent):  Temp: 98.5 °F (36.9 °C) (05/27/18 0736)  Pulse: 80 (05/27/18 0736)  Resp: 18 (05/27/18 0736)  BP: 123/63 (05/27/18 0736)  SpO2: 96 % (05/27/18 0736) Vital Signs (24h Range):  Temp:  [98.1 °F (36.7 °C)-100 °F (37.8 °C)] 98.5 °F (36.9 °C)  Pulse:  [] 80  Resp:  [18-22] 18  SpO2:  [94 %-97 %] 96 %  BP: (105-138)/(52-64) 123/63     Weight: 75.7 kg (166 lb 12.5 oz)  Body mass index is 29.54 kg/m².  Body surface area is 1.83 meters squared.    ECOG SCORE         90% KPS    Intake/Output - Last 3 Shifts       05/25 0700 - 05/26 0659 05/26 0700 - 05/27 0659 05/27 0700 - 05/28 0659    P.O. 1300 980     I.V. (mL/kg) 1800 (23.8) 1796.7 (23.8)     Blood  350     Total Intake(mL/kg) 3100 (41) 3126.7 (41.4)     Urine (mL/kg/hr)  2000 (1.1) 1000 (8.2)    Total Output   2000 1000    Net +3100 +1126.7 -1000           Urine Occurrence 11 x 1300 x           Physical Exam   Constitutional: She is oriented to person, place, and time. Vital signs are normal. She is cooperative. No distress.   HENT:   Head: Normocephalic.   Eyes: Conjunctivae and EOM are normal.   Neck: Trachea normal and normal range of motion. Neck supple.   Cardiovascular: Normal rate, regular rhythm, S1 normal, S2 normal and intact distal pulses.    Pulmonary/Chest: Effort normal and breath sounds normal.   Abdominal: Soft. Normal appearance and bowel sounds are normal.   Musculoskeletal: Normal range of motion.   Neurological: She is alert and oriented to person, place, and time. Coordination and gait normal.   Skin: Skin is dry and intact. She  is not diaphoretic. No pallor.   RUE PICC line with drsg C/D/I  Various small maculopapular lesions/rash to BLE (shins, R more than L). Per pt, this is improving   Psychiatric: She has a normal mood and affect. Her speech is normal.     Significant Labs:   CBC:     Recent Labs  Lab 05/26/18  0306 05/27/18  0400   WBC 47.90* 12.22   HGB 6.5* 6.6*   HCT 19.8* 19.3*   PLT 95* 43*   , CMP:     Recent Labs  Lab 05/26/18  0306 05/27/18  0400    139   K 3.8 3.9    110   CO2 20* 22*   GLU 96 111*   BUN 17 18   CREATININE 1.1 0.8   CALCIUM 7.0* 7.3*   PROT 5.8* 5.5*   ALBUMIN 2.8* 2.6*   BILITOT 0.6 0.7   ALKPHOS 192* 166*   AST 30 20   ALT 16 13   ANIONGAP 11 7*   EGFRNONAA 52.1* >60.0    and Uric Acid     Recent Labs  Lab 05/25/18  1518 05/27/18  0400   URICACID 7.7* 4.8       Diagnostic Results:  I have reviewed all pertinent imaging results/findings within the past 24 hours.   ECHO 5/25/18, ordered, EF 65%  Chest xray after PICC placement 5/25/18, in process

## 2018-05-27 NOTE — PLAN OF CARE
While doing pt's assessment, pt requesting something to help her sleep. Pt states she is having trouble sleeping because she has a lot on her mind. Advised pt will call MD about getting something started for sleep. Pt verbalized understanding.   [id number] called and spoke to Moriah Guillen MD about pt requesting something to help her sleep. Notified MD that pt has prn medication for anxiety but medication can't be given until 0200. MD stated will review pt's chart and put something in  2027 called and spoke with Moriah Guillen MD about medication for sleep since reviewing pt's chart and noted no medication had been ordered at this time. MD states will put an order in shortly.

## 2018-05-27 NOTE — ASSESSMENT & PLAN NOTE
- Admitted from OSH ([facility]) on 5/25/18 early AM with WBC around 100s  - Likely new AML  - Bone marrow biopsy and aspiration 5/25/18 to confirm diagnosis and risk category. Ordered routine labs in addition to FLT3, CEBPA, NPM1, NGS, and AML FISH  - Pt is an induction candidate. Will not screen for research trials. Will likely begin 7+3 chemo once pathology report in.   - Will monitor for tumor lysis syndrome once chemotherapy starts  - With elevated uric acid, on allopurinol 300 mg daily. Increased IVFs to NS at 200 ml/hr. Monitor for need for rasburicase  - WBCs elevated--continue hydrea 2 grams BID, consider decreasing  - ECHO ordered 5/25/18, EF 65%  - PICC line placed 5/25/18   - HIV and hepatitis panel done 5/25/18, negative  - CT head done at OSH for headaches, was negative for any acute process. Headache now resolved after prn pain medication  - Will perform lo res HLA typing on 5/26/18 and hi res on 5/27/18 in anticipation of possible need for future transplant   - Pt with two daughters, two full sisters (in their mid 60s, one with brain aneurysm hx, other one without any medical issues), and one full brother (61 y/o healthy)

## 2018-05-27 NOTE — PROGRESS NOTES
[facility]  Hematology  Bone Marrow Transplant  Progress Note    Patient Name: Noreen Mac  Admission Date: 5/24/2018  Hospital Length of Stay: 3 days  Code Status: Full Code    Subjective:     Interval History:   No complaints this morning other than some anxiety last night and difficulty sleeping. Currently states she is doing well.     Current ROS  Gen: No fevers, chills  Eyes: No vision changes, discharge  ENT: No rhinorrhea, congestion  CV: No CP, palpitations  Pulm: No SOB, cough  GI: No abd pain, nausea, vomiting  Neuro: No HA, paresthesias    Objective:     Vital Signs (Most Recent):  Temp: 98.5 °F (36.9 °C) (05/27/18 0736)  Pulse: 80 (05/27/18 0736)  Resp: 18 (05/27/18 0736)  BP: 123/63 (05/27/18 0736)  SpO2: 96 % (05/27/18 0736) Vital Signs (24h Range):  Temp:  [98.1 °F (36.7 °C)-100 °F (37.8 °C)] 98.5 °F (36.9 °C)  Pulse:  [] 80  Resp:  [18-22] 18  SpO2:  [94 %-97 %] 96 %  BP: (105-138)/(52-64) 123/63     Weight: 75.7 kg (166 lb 12.5 oz)  Body mass index is 29.54 kg/m².  Body surface area is 1.83 meters squared.    ECOG SCORE         90% KPS    Intake/Output - Last 3 Shifts       05/25 0700 - 05/26 0659 05/26 0700 - 05/27 0659 05/27 0700 - 05/28 0659    P.O. 1300 980     I.V. (mL/kg) 1800 (23.8) 1796.7 (23.8)     Blood  350     Total Intake(mL/kg) 3100 (41) 3126.7 (41.4)     Urine (mL/kg/hr)  2000 (1.1) 1000 (8.2)    Total Output   2000 1000    Net +3100 +1126.7 -1000           Urine Occurrence 11 x 1300 x           Physical Exam   Constitutional: She is oriented to person, place, and time. Vital signs are normal. She is cooperative. No distress.   HENT:   Head: Normocephalic.   Eyes: Conjunctivae and EOM are normal.   Neck: Trachea normal and normal range of motion. Neck supple.   Cardiovascular: Normal rate, regular rhythm, S1 normal, S2 normal and intact distal pulses.    Pulmonary/Chest: Effort normal and breath sounds normal.   Abdominal: Soft. Normal appearance and  bowel sounds are normal.   Musculoskeletal: Normal range of motion.   Neurological: She is alert and oriented to person, place, and time. Coordination and gait normal.   Skin: Skin is dry and intact. She is not diaphoretic. No pallor.   RUE PICC line with drsg C/D/I  Various small maculopapular lesions/rash to BLE (shins, R more than L). Per pt, this is improving   Psychiatric: She has a normal mood and affect. Her speech is normal.     Significant Labs:   CBC:     Recent Labs  Lab 05/26/18  0306 05/27/18  0400   WBC 47.90* 12.22   HGB 6.5* 6.6*   HCT 19.8* 19.3*   PLT 95* 43*   , CMP:     Recent Labs  Lab 05/26/18  0306 05/27/18  0400    139   K 3.8 3.9    110   CO2 20* 22*   GLU 96 111*   BUN 17 18   CREATININE 1.1 0.8   CALCIUM 7.0* 7.3*   PROT 5.8* 5.5*   ALBUMIN 2.8* 2.6*   BILITOT 0.6 0.7   ALKPHOS 192* 166*   AST 30 20   ALT 16 13   ANIONGAP 11 7*   EGFRNONAA 52.1* >60.0    and Uric Acid     Recent Labs  Lab 05/25/18  1518 05/27/18  0400   URICACID 7.7* 4.8       Diagnostic Results:  I have reviewed all pertinent imaging results/findings within the past 24 hours.   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 Ordered routine labs in addition to FLT3, CEBPA, NPM1, NGS, and AML FISH  - Pt is an induction candidate. Will not screen for research trials. Will likely begin 7+3 chemo once pathology report in.   - Will monitor for tumor lysis syndrome once chemotherapy starts  - With elevated uric acid, on allopurinol 300 mg daily. Increased IVFs to NS at 200 ml/hr. Monitor for need for rasburicase  - WBCs elevated--continue hydrea 2 grams BID, consider decreasing  - ECHO ordered 5/25/18, EF 65%  - PICC  line placed 5/25/18   - HIV and hepatitis panel done 5/25/18, negative  - CT head done at OSH for headaches, was negative for any acute process. Headache now resolved after prn pain medication  - Will perform lo res HLA typing on 5/26/18 and hi res on 5/27/18 in anticipation of possible need for future transplant   - Pt with two daughters, two full sisters (in their mid 60s, one with brain aneurysm hx, other one without any medical issues), and one full brother (61 y/o healthy)         Rash    - rash to BLE (shins)  - states it occurred mid April 2018 after 3rd dose of prolia  - possible that rash is from AML  - improving on own, will defer bx at this time and continue to monitor        HTN (hypertension)    - takes metoprolol at home  - will hold at this time as HR/BP are controlled; plan to restart if HR/BP rise  - continue to monitor        Electrolyte abnormality    - will hold off on BMT PRN electrolyte replacement protocol due to possible TLS risk in future with start of chemotherapy   - will replace electrolytes as needed day by day  - today: with hypokalemia and hypophosphatemia, was replaced        GERD (gastroesophageal reflux disease)    - takes Omeprazole at home, now on pepcid here  - no issue         Osteoporosis    - takes prolia once every 6 months, last given mid April 2018   - no acute issue         Depression    - continue home Escitalopram   - no acute issue   - with associated anxiety--takes xanax at home but unaware of dose. Started on xanax 0.25 mg bid prn, can increase if needed            VTE Risk Mitigation         Ordered     enoxaparin injection 40 mg  Daily      05/25/18 0553     Place MAYLIN hose  Until discontinued      05/25/18 0032     IP VTE HIGH RISK PATIENT  Once      05/25/18 0032          Disposition: pending    Kirby Van IV, MD  Bone Marrow Transplant  [facility]

## 2018-05-28 PROBLEM — G47.00 INSOMNIA: Status: ACTIVE | Noted: 2018-05-28

## 2018-05-28 PROBLEM — R50.81 NEUTROPENIC FEVER: Status: ACTIVE | Noted: 2018-05-28

## 2018-05-28 PROBLEM — D70.9 NEUTROPENIC FEVER: Status: ACTIVE | Noted: 2018-05-28

## 2018-05-28 PROBLEM — R91.1 PULMONARY NODULE: Status: ACTIVE | Noted: 2018-05-28

## 2018-05-28 LAB
ALBUMIN SERPL BCP-MCNC: 2.7 G/DL
ALP SERPL-CCNC: 170 U/L
ALT SERPL W/O P-5'-P-CCNC: 12 U/L
ANION GAP SERPL CALC-SCNC: 7 MMOL/L
ANISOCYTOSIS BLD QL SMEAR: SLIGHT
APTT BLDCRRT: 22.4 SEC
AST SERPL-CCNC: 16 U/L
BASOPHILS NFR BLD: 0 %
BILIRUB SERPL-MCNC: 1.3 MG/DL
BILIRUB UR QL STRIP: NEGATIVE
BLASTS NFR BLD MANUAL: 15 %
BLD PROD TYP BPU: NORMAL
BLOOD UNIT EXPIRATION DATE: NORMAL
BLOOD UNIT TYPE CODE: 6200
BLOOD UNIT TYPE: NORMAL
BONE MARROW IRON STAIN COMMENT: NORMAL
BUN SERPL-MCNC: 13 MG/DL
CALCIUM SERPL-MCNC: 8 MG/DL
CHLORIDE SERPL-SCNC: 109 MMOL/L
CLARITY UR REFRACT.AUTO: CLEAR
CO2 SERPL-SCNC: 23 MMOL/L
CODING SYSTEM: NORMAL
COLOR UR AUTO: YELLOW
CREAT SERPL-MCNC: 0.8 MG/DL
DIFFERENTIAL METHOD: ABNORMAL
DISPENSE STATUS: NORMAL
EOSINOPHIL NFR BLD: 0 %
ERYTHROCYTE [DISTWIDTH] IN BLOOD BY AUTOMATED COUNT: 16.8 %
EST. GFR  (AFRICAN AMERICAN): >60 ML/MIN/1.73 M^2
EST. GFR  (NON AFRICAN AMERICAN): >60 ML/MIN/1.73 M^2
GLUCOSE SERPL-MCNC: 107 MG/DL
GLUCOSE UR QL STRIP: NEGATIVE
HCT VFR BLD AUTO: 20.3 %
HGB BLD-MCNC: 7 G/DL
HGB UR QL STRIP: ABNORMAL
IMM GRANULOCYTES # BLD AUTO: ABNORMAL K/UL
IMM GRANULOCYTES NFR BLD AUTO: ABNORMAL %
INR PPP: 1.1
KETONES UR QL STRIP: NEGATIVE
LEUKOCYTE ESTERASE UR QL STRIP: NEGATIVE
LYMPHOCYTES NFR BLD: 20 %
MAGNESIUM SERPL-MCNC: 1.5 MG/DL
MCH RBC QN AUTO: 32.1 PG
MCHC RBC AUTO-ENTMCNC: 34.5 G/DL
MCV RBC AUTO: 93 FL
METAMYELOCYTES NFR BLD MANUAL: 1 %
MICROSCOPIC COMMENT: ABNORMAL
MONOCYTES NFR BLD: 47 %
NEUTROPHILS NFR BLD: 16 %
NEUTS BAND NFR BLD MANUAL: 1 %
NITRITE UR QL STRIP: NEGATIVE
NRBC BLD-RTO: 0 /100 WBC
NUM UNITS TRANS PACKED RBC: NORMAL
OVALOCYTES BLD QL SMEAR: ABNORMAL
PH UR STRIP: 7 [PH] (ref 5–8)
PHOSPHATE SERPL-MCNC: 3.5 MG/DL
PLATELET # BLD AUTO: 24 K/UL
PLATELET BLD QL SMEAR: ABNORMAL
PMV BLD AUTO: 10.2 FL
POIKILOCYTOSIS BLD QL SMEAR: SLIGHT
POLYCHROMASIA BLD QL SMEAR: ABNORMAL
POTASSIUM SERPL-SCNC: 3.7 MMOL/L
PROT SERPL-MCNC: 5.7 G/DL
PROT UR QL STRIP: NEGATIVE
PROTHROMBIN TIME: 11.4 SEC
RBC # BLD AUTO: 2.18 M/UL
RBC #/AREA URNS AUTO: 27 /HPF (ref 0–4)
SODIUM SERPL-SCNC: 139 MMOL/L
SP GR UR STRIP: 1.01 (ref 1–1.03)
SQUAMOUS #/AREA URNS AUTO: 1 /HPF
URATE SERPL-MCNC: 4 MG/DL
URN SPEC COLLECT METH UR: ABNORMAL
UROBILINOGEN UR STRIP-ACNC: NEGATIVE EU/DL
WBC # BLD AUTO: 3.82 K/UL
WBC #/AREA URNS AUTO: 1 /HPF (ref 0–5)

## 2018-05-28 PROCEDURE — 85007 BL SMEAR W/DIFF WBC COUNT: CPT

## 2018-05-28 PROCEDURE — 81376 HLA II TYPING 1 LOCUS LR: CPT | Mod: 91

## 2018-05-28 PROCEDURE — 85610 PROTHROMBIN TIME: CPT

## 2018-05-28 PROCEDURE — A4216 STERILE WATER/SALINE, 10 ML: HCPCS | Performed by: INTERNAL MEDICINE

## 2018-05-28 PROCEDURE — 85027 COMPLETE CBC AUTOMATED: CPT

## 2018-05-28 PROCEDURE — 93005 ELECTROCARDIOGRAM TRACING: CPT

## 2018-05-28 PROCEDURE — 85730 THROMBOPLASTIN TIME PARTIAL: CPT

## 2018-05-28 PROCEDURE — 93010 ELECTROCARDIOGRAM REPORT: CPT | Mod: ,,, | Performed by: INTERNAL MEDICINE

## 2018-05-28 PROCEDURE — 84550 ASSAY OF BLOOD/URIC ACID: CPT

## 2018-05-28 PROCEDURE — 25000003 PHARM REV CODE 250: Performed by: INTERNAL MEDICINE

## 2018-05-28 PROCEDURE — 63600175 PHARM REV CODE 636 W HCPCS: Performed by: HOSPITALIST

## 2018-05-28 PROCEDURE — 25000003 PHARM REV CODE 250: Performed by: STUDENT IN AN ORGANIZED HEALTH CARE EDUCATION/TRAINING PROGRAM

## 2018-05-28 PROCEDURE — 81373 HLA I TYPING 1 LOCUS LR: CPT | Mod: 91

## 2018-05-28 PROCEDURE — 36430 TRANSFUSION BLD/BLD COMPNT: CPT

## 2018-05-28 PROCEDURE — 83735 ASSAY OF MAGNESIUM: CPT

## 2018-05-28 PROCEDURE — 99233 SBSQ HOSP IP/OBS HIGH 50: CPT | Mod: ,,, | Performed by: INTERNAL MEDICINE

## 2018-05-28 PROCEDURE — 80053 COMPREHEN METABOLIC PANEL: CPT

## 2018-05-28 PROCEDURE — 81373 HLA I TYPING 1 LOCUS LR: CPT

## 2018-05-28 PROCEDURE — 20600001 HC STEP DOWN PRIVATE ROOM

## 2018-05-28 PROCEDURE — 25000003 PHARM REV CODE 250: Performed by: NURSE PRACTITIONER

## 2018-05-28 PROCEDURE — 81380 HLA I TYPING 1 LOCUS HR: CPT

## 2018-05-28 PROCEDURE — P9040 RBC LEUKOREDUCED IRRADIATED: HCPCS

## 2018-05-28 PROCEDURE — 81376 HLA II TYPING 1 LOCUS LR: CPT

## 2018-05-28 PROCEDURE — 81001 URINALYSIS AUTO W/SCOPE: CPT

## 2018-05-28 PROCEDURE — 86644 CMV ANTIBODY: CPT

## 2018-05-28 PROCEDURE — 25000003 PHARM REV CODE 250: Performed by: HOSPITALIST

## 2018-05-28 PROCEDURE — 84100 ASSAY OF PHOSPHORUS: CPT

## 2018-05-28 PROCEDURE — 87040 BLOOD CULTURE FOR BACTERIA: CPT | Mod: 59

## 2018-05-28 PROCEDURE — 36415 COLL VENOUS BLD VENIPUNCTURE: CPT

## 2018-05-28 RX ORDER — DIPHENHYDRAMINE HYDROCHLORIDE 50 MG/ML
12.5 INJECTION INTRAMUSCULAR; INTRAVENOUS EVERY 6 HOURS PRN
Status: DISCONTINUED | OUTPATIENT
Start: 2018-05-28 | End: 2018-06-14

## 2018-05-28 RX ORDER — LANOLIN ALCOHOL/MO/W.PET/CERES
400 CREAM (GRAM) TOPICAL 2 TIMES DAILY
Status: COMPLETED | OUTPATIENT
Start: 2018-05-28 | End: 2018-05-28

## 2018-05-28 RX ORDER — CEFEPIME HYDROCHLORIDE 2 G/1
2 INJECTION, POWDER, FOR SOLUTION INTRAVENOUS
Status: DISCONTINUED | OUTPATIENT
Start: 2018-05-28 | End: 2018-05-30

## 2018-05-28 RX ORDER — HYDROCODONE BITARTRATE AND ACETAMINOPHEN 500; 5 MG/1; MG/1
TABLET ORAL
Status: DISCONTINUED | OUTPATIENT
Start: 2018-05-28 | End: 2018-05-29

## 2018-05-28 RX ADMIN — METOPROLOL SUCCINATE 12.5 MG: 25 TABLET, EXTENDED RELEASE ORAL at 10:05

## 2018-05-28 RX ADMIN — Medication 400 MG: at 09:05

## 2018-05-28 RX ADMIN — ESCITALOPRAM 10 MG: 5 TABLET, FILM COATED ORAL at 08:05

## 2018-05-28 RX ADMIN — ACETAMINOPHEN 650 MG: 325 TABLET, FILM COATED ORAL at 01:05

## 2018-05-28 RX ADMIN — CEFEPIME HYDROCHLORIDE 2 G: 2 INJECTION, POWDER, FOR SOLUTION INTRAVENOUS at 09:05

## 2018-05-28 RX ADMIN — Medication 10 ML: at 09:05

## 2018-05-28 RX ADMIN — ALPRAZOLAM 0.25 MG: 0.25 TABLET ORAL at 10:05

## 2018-05-28 RX ADMIN — ACYCLOVIR 400 MG: 200 CAPSULE ORAL at 09:05

## 2018-05-28 RX ADMIN — FAMOTIDINE 20 MG: 20 TABLET ORAL at 08:05

## 2018-05-28 RX ADMIN — Medication 400 MG: at 08:05

## 2018-05-28 RX ADMIN — ACYCLOVIR 400 MG: 200 CAPSULE ORAL at 08:05

## 2018-05-28 RX ADMIN — CIPROFLOXACIN HYDROCHLORIDE 500 MG: 500 TABLET, FILM COATED ORAL at 08:05

## 2018-05-28 RX ADMIN — CEFEPIME HYDROCHLORIDE 2 G: 2 INJECTION, POWDER, FOR SOLUTION INTRAVENOUS at 02:05

## 2018-05-28 RX ADMIN — ALPRAZOLAM 0.25 MG: 0.25 TABLET ORAL at 09:05

## 2018-05-28 RX ADMIN — ACETAMINOPHEN 650 MG: 325 TABLET, FILM COATED ORAL at 09:05

## 2018-05-28 RX ADMIN — ALLOPURINOL 300 MG: 300 TABLET ORAL at 08:05

## 2018-05-28 NOTE — SUBJECTIVE & OBJECTIVE
Subjective:     Interval History:     - denies any complaints this morning.   - remains anxious, but PRN xanax helps.   - VSS, Afebrile.   - (-) 740 cc.     Objective:     Vital Signs (Most Recent):  Temp: 98.5 °F (36.9 °C) (05/28/18 0636)  Pulse: 89 (05/28/18 0636)  Resp: 18 (05/28/18 0636)  BP: 136/77 (05/28/18 0636)  SpO2: 96 % (05/28/18 0636) Vital Signs (24h Range):  Temp:  [98 °F (36.7 °C)-99.7 °F (37.6 °C)] 98.5 °F (36.9 °C)  Pulse:  [] 89  Resp:  [16-20] 18  SpO2:  [93 %-97 %] 96 %  BP: (123-142)/(58-77) 136/77     Weight: 75.7 kg (166 lb 12.5 oz)  Body mass index is 29.54 kg/m².  Body surface area is 1.83 meters squared.    ECOG SCORE         90% KPS    Intake/Output - Last 3 Shifts       05/26 0700 - 05/27 0659 05/27 0700 - 05/28 0659    P.O. 980 600    I.V. (mL/kg) 1796.7 (23.8) 10 (0.1)    Blood 350 350    Total Intake(mL/kg) 3126.7 (41.4) 960 (12.7)    Urine (mL/kg/hr) 2000 (1.1) 2350 (1.3)    Total Output 2000 2350    Net +1126.7 -1390          Urine Occurrence 1300 x           Physical Exam   Constitutional: She is oriented to person, place, and time. Vital signs are normal. She is cooperative. No distress.   HENT:   Head: Normocephalic.   Eyes: Conjunctivae and EOM are normal.   Neck: Trachea normal and normal range of motion. Neck supple.   Cardiovascular: Normal rate, regular rhythm, S1 normal, S2 normal and intact distal pulses.    Pulmonary/Chest: Effort normal and breath sounds normal.   Abdominal: Soft. Normal appearance and bowel sounds are normal.   Musculoskeletal: Normal range of motion.   Neurological: She is alert and oriented to person, place, and time. Coordination and gait normal.   Skin: Skin is dry and intact. She is not diaphoretic. No pallor.   RUE PICC line with drsg C/D/I  Various small maculopapular lesions/rash to BLE (shins, R more than L). Per pt, this is improving   Psychiatric: She has a normal mood and affect. Her speech is normal.     Significant Labs:   CBC:      Recent Labs  Lab 05/27/18  0400 05/28/18  0342   WBC 12.22 3.82*   HGB 6.6* 7.0*   HCT 19.3* 20.3*   PLT 43*  --    , CMP:     Recent Labs  Lab 05/27/18  0400 05/28/18  0342    139   K 3.9 3.7    109   CO2 22* 23   * 107   BUN 18 13   CREATININE 0.8 0.8   CALCIUM 7.3* 8.0*   PROT 5.5* 5.7*   ALBUMIN 2.6* 2.7*   BILITOT 0.7 1.3*   ALKPHOS 166* 170*   AST 20 16   ALT 13 12   ANIONGAP 7* 7*   EGFRNONAA >60.0 >60.0    and Uric Acid     Recent Labs  Lab 05/27/18  0400 05/28/18  0342   URICACID 4.8 4.0       Diagnostic Results:  I have reviewed all pertinent imaging results/findings within the past 24 hours.   ECHO 5/25/18, ordered, EF 65%

## 2018-05-28 NOTE — ASSESSMENT & PLAN NOTE
- Admitted from [facility] ([facility]) on 5/25/18 early AM with WBC around 100s  - Likely new AML  - Bone marrow biopsy and aspiration done on 5/25/18 to confirm diagnosis and risk category. Ordered routine labs in addition to FLT3, CEBPA, NPM1, NGS, and AML FISH.  - lo res HLA typing on 5/26/18 and hi res on 5/27/18 done in anticipation of possible need for future transplant   - Pt with two daughters, two full sisters (in their mid 60s, one with brain aneurysm hx, other one without any medical issues), and one full brother (61 y/o healthy).  - ECHO ordered 5/25/18, EF 65%  - PICC line placed 5/25/18   - Pt is an induction candidate. Will not screen for research trials. Will likely begin 7+3 chemo once pathology confirmed.   - WBC: 3,820/mm3 this morning and hydrea currently at 1 g BID.   - will discontinue today given cytopenias now and likely start of therapy today or tomorrow.   - Will monitor for tumor lysis syndrome once chemotherapy starts, on allopurinol 300 mg daily and IVF at 100 cc/hr.   - prophylactic antimicrobials: Acyclovir, Cipro.   - will need CNS Prophylaxis given his elevated WBC on admission.

## 2018-05-28 NOTE — ASSESSMENT & PLAN NOTE
- CXR suggestive of 7 mm slightly irregular shaped radiodensity projected over the left upper lung zone which could represent a calcified granuloma but remains indeterminate.

## 2018-05-28 NOTE — SUBJECTIVE & OBJECTIVE
Subjective:     Interval History:     - denies any complaints this morning.   - remains anxious, but PRN xanax helps.   - VSS, Afebrile.   - (-) 740 cc.     Objective:     Vital Signs (Most Recent):  Temp: 98.5 °F (36.9 °C) (05/28/18 0636)  Pulse: 89 (05/28/18 0636)  Resp: 18 (05/28/18 0636)  BP: 136/77 (05/28/18 0636)  SpO2: 96 % (05/28/18 0636) Vital Signs (24h Range):  Temp:  [98 °F (36.7 °C)-99.7 °F (37.6 °C)] 98.5 °F (36.9 °C)  Pulse:  [] 89  Resp:  [16-20] 18  SpO2:  [93 %-97 %] 96 %  BP: (124-142)/(58-77) 136/77     Weight: 75.7 kg (166 lb 12.5 oz)  Body mass index is 29.54 kg/m².  Body surface area is 1.83 meters squared.    ECOG SCORE         90% KPS    Intake/Output - Last 3 Shifts       05/26 0700 - 05/27 0659 05/27 0700 - 05/28 0659 05/28 0700 - 05/29 0659    P.O. 980 600 480    I.V. (mL/kg) 1796.7 (23.8) 10 (0.1) 2533 (33.5)    Blood 350 350     Total Intake(mL/kg) 3126.7 (41.4) 960 (12.7) 3013 (39.9)    Urine (mL/kg/hr) 2000 (1.1) 2350 (1.3) 750 (6.5)    Total Output 2000 2350 750    Net +1126.7 -1390 +2263           Urine Occurrence 1300 x  2 x          Physical Exam   Constitutional: She is oriented to person, place, and time. Vital signs are normal. She is cooperative. No distress.   HENT:   Head: Normocephalic.   Eyes: Conjunctivae and EOM are normal.   Neck: Trachea normal and normal range of motion. Neck supple.   Cardiovascular: Normal rate, regular rhythm, S1 normal, S2 normal and intact distal pulses.    Pulmonary/Chest: Effort normal and breath sounds normal.   Abdominal: Soft. Normal appearance and bowel sounds are normal.   Musculoskeletal: Normal range of motion.   Neurological: She is alert and oriented to person, place, and time. Coordination and gait normal.   Skin: Skin is dry and intact. She is not diaphoretic. No pallor.   RUE PICC line with drsg C/D/I  Various small maculopapular lesions/rash to BLE (shins, R more than L). Per pt, this is improving   Psychiatric: She has a  normal mood and affect. Her speech is normal.     Significant Labs:   CBC:     Recent Labs  Lab 05/27/18  0400 05/28/18  0342   WBC 12.22 3.82*   HGB 6.6* 7.0*   HCT 19.3* 20.3*   PLT 43* 24*   , CMP:     Recent Labs  Lab 05/27/18  0400 05/28/18  0342    139   K 3.9 3.7    109   CO2 22* 23   * 107   BUN 18 13   CREATININE 0.8 0.8   CALCIUM 7.3* 8.0*   PROT 5.5* 5.7*   ALBUMIN 2.6* 2.7*   BILITOT 0.7 1.3*   ALKPHOS 166* 170*   AST 20 16   ALT 13 12   ANIONGAP 7* 7*   EGFRNONAA >60.0 >60.0    and Uric Acid     Recent Labs  Lab 05/27/18  0400 05/28/18  0342   URICACID 4.8 4.0       Diagnostic Results:  I have reviewed all pertinent imaging results/findings within the past 24 hours.   ECHO 5/25/18, ordered, EF 65%

## 2018-05-28 NOTE — PHYSICIAN QUERY
PT Name: Noreen Mac  MR #: [id number]     Physician Query Form - Documentation Clarification      Madonna Harmon RN CDS    Contact Information: [phone]    This form is a permanent document in the medical record.     Query Date: May 28, 2018    By submitting this query, we are merely seeking further clarification of documentation. Please utilize your independent clinical judgment when addressing the question(s) below.    The Medical record reflects the following:    Supporting Clinical Findings Location in Medical Record   Magnesium 1.7, 1.2, 1.7, 1.5       Lab 5/25-5/28   Magnesium sulfate 2 gm IVPB Every 2 hrs    Magnesium oxide tablet 400 mg oral 2 times daily   MAR 5/26, 0852, 1014    MAR 5/28                                                                            Doctor, Please specify diagnosis or diagnoses associated with above clinical findings.    Provider Use Only    [ x ] Hypomagnesemia    [  ] Other Conditions (Specify)______________________________                                                                                                               [  ] Clinically undetermined

## 2018-05-28 NOTE — PROGRESS NOTES
[facility]  Hematology  Bone Marrow Transplant  Progress Note    Patient Name: Noreen Mac  Admission Date: 5/24/2018  Hospital Length of Stay: 4 days  Code Status: Full Code    Subjective:     Interval History:     - denies any complaints this morning.   - remains anxious, but PRN xanax helps.   - VSS, Afebrile.   - (-) 740 cc.     Objective:     Vital Signs (Most Recent):  Temp: 98.5 °F (36.9 °C) (05/28/18 0636)  Pulse: 89 (05/28/18 0636)  Resp: 18 (05/28/18 0636)  BP: 136/77 (05/28/18 0636)  SpO2: 96 % (05/28/18 0636) Vital Signs (24h Range):  Temp:  [98 °F (36.7 °C)-99.7 °F (37.6 °C)] 98.5 °F (36.9 °C)  Pulse:  [] 89  Resp:  [16-20] 18  SpO2:  [93 %-97 %] 96 %  BP: (123-142)/(58-77) 136/77     Weight: 75.7 kg (166 lb 12.5 oz)  Body mass index is 29.54 kg/m².  Body surface area is 1.83 meters squared.    ECOG SCORE         90% KPS    Intake/Output - Last 3 Shifts       05/26 0700 - 05/27 0659 05/27 0700 - 05/28 0659    P.O. 980 600    I.V. (mL/kg) 1796.7 (23.8) 10 (0.1)    Blood 350 350    Total Intake(mL/kg) 3126.7 (41.4) 960 (12.7)    Urine (mL/kg/hr) 2000 (1.1) 2350 (1.3)    Total Output 2000 2350    Net +1126.7 -1390          Urine Occurrence 1300 x           Physical Exam   Constitutional: She is oriented to person, place, and time. Vital signs are normal. She is cooperative. No distress.   HENT:   Head: Normocephalic.   Eyes: Conjunctivae and EOM are normal.   Neck: Trachea normal and normal range of motion. Neck supple.   Cardiovascular: Normal rate, regular rhythm, S1 normal, S2 normal and intact distal pulses.    Pulmonary/Chest: Effort normal and breath sounds normal.   Abdominal: Soft. Normal appearance and bowel sounds are normal.   Musculoskeletal: Normal range of motion.   Neurological: She is alert and oriented to person, place, and time. Coordination and gait normal.   Skin: Skin is dry and intact. She is not diaphoretic. No pallor.   RUE PICC line with valente  C/D/I  Various small maculopapular lesions/rash to BLE (shins, R more than L). Per pt, this is improving   Psychiatric: She has a normal mood and affect. Her speech is normal.     Significant Labs:   CBC:     Recent Labs  Lab 05/27/18  0400 05/28/18  0342   WBC 12.22 3.82*   HGB 6.6* 7.0*   HCT 19.3* 20.3*   PLT 43*  --    , CMP:     Recent Labs  Lab 05/27/18  0400 05/28/18  0342    139   K 3.9 3.7    109   CO2 22* 23   * 107   BUN 18 13   CREATININE 0.8 0.8   CALCIUM 7.3* 8.0*   PROT 5.5* 5.7*   ALBUMIN 2.6* 2.7*   BILITOT 0.7 1.3*   ALKPHOS 166* 170*   AST 20 16   ALT 13 12   ANIONGAP 7* 7*   EGFRNONAA >60.0 >60.0    and Uric Acid     Recent Labs  Lab 05/27/18  0400 05/28/18  0342   URICACID 4.8 4.0       Diagnostic Results:  I have reviewed all pertinent imaging results/findings within the past 24 hours.   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 Ordered routine labs in addition to FLT3, CEBPA, NPM1, NGS, and AML FISH.  - lo res HLA typing on 5/26/18 and hi res on 5/27/18 done in anticipation of possible need for future transplant   - Pt with two daughters, two full sisters (in their mid 60s, one with brain aneurysm hx, other one without any medical issues), and one full brother (61 y/o healthy).  - ECHO ordered 5/25/18, EF 65%  - PICC line placed 5/25/18   - Pt is an induction candidate. Will not screen for research trials. Will likely begin 7+3 chemo once pathology confirmed.   - WBC: 3,820/mm3 this morning and hydrea currently at 1 g BID.   - Will monitor for tumor lysis syndrome once chemotherapy starts, on allopurinol 300 mg daily and IVF at 100 cc/hr.   - prophylactic antimicrobials: Acyclovir, Cipro.               Insomnia     - PRN nightly Xanax.         Rash    - rash to BLE (shins)  - states it occurred mid April 2018 after 3rd dose of prolia  - possible that rash is from AML  - improving on own, will defer bx at this time and continue to monitor        HTN (hypertension)    - takes metoprolol at home  - will hold at this time as HR/BP are controlled; plan to restart if HR/BP rise  - continue to monitor        Electrolyte abnormality    - will replace electrolytes as needed.          GERD (gastroesophageal reflux disease)    - takes Omeprazole at home, now on pepcid here  - no issue         Osteoporosis    - takes prolia once every 6 months, last given mid April 2018   - no acute issue         Depression    - continue home Escitalopram   - no acute issue   - with associated anxiety--takes xanax at home but unaware of dose. Started on xanax 0.25 mg bid prn, can increase if needed            VTE Risk Mitigation         Ordered     Place MAYLIN hose  Until discontinued      05/25/18 0032     IP VTE HIGH RISK PATIENT  Once      05/25/18 0032          Disposition: awaiting induction chemotherapy.     Mathew Rapp MD  Bone Marrow Transplant  [facility]

## 2018-05-28 NOTE — PLAN OF CARE
Problem: Patient Care Overview  Goal: Plan of Care Review  Outcome: Ongoing (interventions implemented as appropriate)  Pt involved in plan of care and communicating needs throughout shift.  Family at bedside throughout shift and involved in care.  Pt up in room independently and ambulates with steady gait.  Pt c/o headache this afternoon; prn tylenol admin with good effect.  Tolerating regular diet, voiding without difficulty.  Pt had T max of 101 with noon VS; all other VSS; blood cultures x 2, UA, CXR ordered; IV cefepime initiated; tylenol admin with good effect.  Pt had chest CT this afternoon as ordered.  Otherwise no acute events so far this shift.  Pt remaining free from falls or injury throughout shift; bed in lowest position; call light within reach.  Pt instructed to call for assistance as needed.  Q1H rounding done on pt.

## 2018-05-28 NOTE — ASSESSMENT & PLAN NOTE
- Admitted from [facility] ([facility]) on 5/25/18 early AM with WBC around 100s  - Likely new AML  - Bone marrow biopsy and aspiration done on 5/25/18 to confirm diagnosis and risk category. Ordered routine labs in addition to FLT3, CEBPA, NPM1, NGS, and AML FISH.  - lo res HLA typing on 5/26/18 and hi res on 5/27/18 done in anticipation of possible need for future transplant   - Pt with two daughters, two full sisters (in their mid 60s, one with brain aneurysm hx, other one without any medical issues), and one full brother (59 y/o healthy).  - ECHO ordered 5/25/18, EF 65%  - PICC line placed 5/25/18   - Pt is an induction candidate. Will not screen for research trials. Will likely begin 7+3 chemo once pathology confirmed.   - WBC: 3,820/mm3 this morning and hydrea currently at 1 g BID.   - Will monitor for tumor lysis syndrome once chemotherapy starts, on allopurinol 300 mg daily and IVF at 100 cc/hr.   - prophylactic antimicrobials: Acyclovir, Cipro.

## 2018-05-28 NOTE — PROGRESS NOTES
[facility]  Hematology  Bone Marrow Transplant  Progress Note    Patient Name: Noreen Mac  Admission Date: 5/24/2018  Hospital Length of Stay: 4 days  Code Status: Full Code    Subjective:     Interval History:     - denies any complaints this morning.   - remains anxious, but PRN xanax helps.   - VSS, Afebrile.   - (-) 740 cc.     Objective:     Vital Signs (Most Recent):  Temp: 98.5 °F (36.9 °C) (05/28/18 0636)  Pulse: 89 (05/28/18 0636)  Resp: 18 (05/28/18 0636)  BP: 136/77 (05/28/18 0636)  SpO2: 96 % (05/28/18 0636) Vital Signs (24h Range):  Temp:  [98 °F (36.7 °C)-99.7 °F (37.6 °C)] 98.5 °F (36.9 °C)  Pulse:  [] 89  Resp:  [16-20] 18  SpO2:  [93 %-97 %] 96 %  BP: (124-142)/(58-77) 136/77     Weight: 75.7 kg (166 lb 12.5 oz)  Body mass index is 29.54 kg/m².  Body surface area is 1.83 meters squared.    ECOG SCORE         90% KPS    Intake/Output - Last 3 Shifts       05/26 0700 - 05/27 0659 05/27 0700 - 05/28 0659 05/28 0700 - 05/29 0659    P.O. 980 600 480    I.V. (mL/kg) 1796.7 (23.8) 10 (0.1) 2533 (33.5)    Blood 350 350     Total Intake(mL/kg) 3126.7 (41.4) 960 (12.7) 3013 (39.9)    Urine (mL/kg/hr) 2000 (1.1) 2350 (1.3) 750 (6.5)    Total Output 2000 2350 750    Net +1126.7 -1390 +2263           Urine Occurrence 1300 x  2 x          Physical Exam   Constitutional: She is oriented to person, place, and time. Vital signs are normal. She is cooperative. No distress.   HENT:   Head: Normocephalic.   Eyes: Conjunctivae and EOM are normal.   Neck: Trachea normal and normal range of motion. Neck supple.   Cardiovascular: Normal rate, regular rhythm, S1 normal, S2 normal and intact distal pulses.    Pulmonary/Chest: Effort normal and breath sounds normal.   Abdominal: Soft. Normal appearance and bowel sounds are normal.   Musculoskeletal: Normal range of motion.   Neurological: She is alert and oriented to person, place, and time. Coordination and gait normal.   Skin: Skin is dry and  intact. She is not diaphoretic. No pallor.   RUE PICC line with drsg C/D/I  Various small maculopapular lesions/rash to BLE (shins, R more than L). Per pt, this is improving   Psychiatric: She has a normal mood and affect. Her speech is normal.     Significant Labs:   CBC:     Recent Labs  Lab 05/27/18  0400 05/28/18  0342   WBC 12.22 3.82*   HGB 6.6* 7.0*   HCT 19.3* 20.3*   PLT 43* 24*   , CMP:     Recent Labs  Lab 05/27/18  0400 05/28/18  0342    139   K 3.9 3.7    109   CO2 22* 23   * 107   BUN 18 13   CREATININE 0.8 0.8   CALCIUM 7.3* 8.0*   PROT 5.5* 5.7*   ALBUMIN 2.6* 2.7*   BILITOT 0.7 1.3*   ALKPHOS 166* 170*   AST 20 16   ALT 13 12   ANIONGAP 7* 7*   EGFRNONAA >60.0 >60.0    and Uric Acid     Recent Labs  Lab 05/27/18  0400 05/28/18  0342   URICACID 4.8 4.0       Diagnostic Results:  I have reviewed all pertinent imaging results/findings within the past 24 hours.   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 Ordered routine labs in addition to FLT3, CEBPA, NPM1, NGS, and AML FISH.  - lo res HLA typing on 5/26/18 and hi res on 5/27/18 done in anticipation of possible need for future transplant   - Pt with two daughters, two full sisters (in their mid 60s, one with brain aneurysm hx, other one without any medical issues), and one full brother (59 y/o healthy).  - ECHO ordered 5/25/18, EF 65%  - PICC line placed 5/25/18   - Pt is an induction candidate. Will not screen for research trials. Will likely begin 7+3 chemo once pathology confirmed.   - WBC: 3,820/mm3 this morning and hydrea currently at 1 g BID.   - will discontinue today given cytopenias now and likely start of therapy today or tomorrow.   - Will monitor for tumor lysis  syndrome once chemotherapy starts, on allopurinol 300 mg daily and IVF at 100 cc/hr.   - prophylactic antimicrobials: Acyclovir, Cipro.   - will need CNS Prophylaxis given his elevated WBC on admission.               Pulmonary nodule    - CXR suggestive of 7 mm slightly irregular shaped radiodensity projected over the left upper lung zone which could represent a calcified granuloma but remains indeterminate.        Insomnia    - PRN nightly Xanax.         Rash    - rash to BLE (shins)  - states it occurred mid April 2018 after 3rd dose of prolia  - possible that rash is from AML  - improving on own, will defer bx at this time and continue to monitor        HTN (hypertension)    - takes metoprolol at home  - will hold at this time as HR/BP are controlled; plan to restart if HR/BP rise  - continue to monitor        Electrolyte abnormality    - will replace electrolytes as needed.          GERD (gastroesophageal reflux disease)    - takes Omeprazole at home, now on pepcid here  - no issue         Osteoporosis    - takes prolia once every 6 months, last given mid April 2018   - no acute issue         Depression    - continue home Escitalopram   - no acute issue   - with associated anxiety--takes xanax at home but unaware of dose. Started on xanax 0.25 mg bid prn, can increase if needed            VTE Risk Mitigation         Ordered     Place MAYLIN riverae  Until discontinued      05/25/18 0032     IP VTE HIGH RISK PATIENT  Once      05/25/18 0032          Disposition: awaiting induction chemotherapy.     Mathew Rapp MD  Bone Marrow Transplant  [facility]

## 2018-05-28 NOTE — PLAN OF CARE
Problem: Patient Care Overview  Goal: Plan of Care Review  Outcome: Ongoing (interventions implemented as appropriate)  Care Plan reviewed and acknowledged.  Pt is free from any new or additional falls/injury/skin breakdown.  Pt had no major complaints that were not handled with PRN/scheduled medication.  Tolerating diet.  VSS; no signs of distress; will continue to monitor.

## 2018-05-28 NOTE — PROGRESS NOTES
Notified Dr. Van for BMT that pt has temp of 101 axillary; all other VSS; pt with c/o headache but otherwise asymptomatic.  Blood cx x 2, UA, CXR, IV cefepime ordered.  PRN tylenol admin at this time. Will continue to monitor.

## 2018-05-29 PROBLEM — R51.9 BILATERAL HEADACHES: Status: ACTIVE | Noted: 2018-05-29

## 2018-05-29 PROBLEM — M35.1 MCTD (MIXED CONNECTIVE TISSUE DISEASE): Status: ACTIVE | Noted: 2018-05-29

## 2018-05-29 PROBLEM — E87.8 ALTERATION IN ELECTROLYTE AND FLUID BALANCE: Status: ACTIVE | Noted: 2018-05-29

## 2018-05-29 LAB
ABO + RH BLD: NORMAL
ALBUMIN SERPL BCP-MCNC: 2.8 G/DL
ALP SERPL-CCNC: 156 U/L
ALT SERPL W/O P-5'-P-CCNC: 11 U/L
ANION GAP SERPL CALC-SCNC: 6 MMOL/L
ANISOCYTOSIS BLD QL SMEAR: SLIGHT
AST SERPL-CCNC: 14 U/L
BASOPHILS # BLD AUTO: ABNORMAL K/UL
BASOPHILS NFR BLD: 0 %
BILIRUB SERPL-MCNC: 1.5 MG/DL
BLASTS NFR BLD MANUAL: 10 %
BLD GP AB SCN CELLS X3 SERPL QL: NORMAL
BUN SERPL-MCNC: 11 MG/DL
CALCIUM SERPL-MCNC: 8.3 MG/DL
CHLORIDE SERPL-SCNC: 106 MMOL/L
CO2 SERPL-SCNC: 26 MMOL/L
CREAT SERPL-MCNC: 0.8 MG/DL
DIFFERENTIAL METHOD: ABNORMAL
EOSINOPHIL # BLD AUTO: ABNORMAL K/UL
EOSINOPHIL NFR BLD: 1 %
ERYTHROCYTE [DISTWIDTH] IN BLOOD BY AUTOMATED COUNT: 16.2 %
EST. GFR  (AFRICAN AMERICAN): >60 ML/MIN/1.73 M^2
EST. GFR  (NON AFRICAN AMERICAN): >60 ML/MIN/1.73 M^2
GLUCOSE SERPL-MCNC: 99 MG/DL
HCT VFR BLD AUTO: 22.7 %
HGB BLD-MCNC: 7.7 G/DL
HYPOCHROMIA BLD QL SMEAR: ABNORMAL
IMM GRANULOCYTES # BLD AUTO: ABNORMAL K/UL
IMM GRANULOCYTES NFR BLD AUTO: ABNORMAL %
LYMPHOCYTES # BLD AUTO: ABNORMAL K/UL
LYMPHOCYTES NFR BLD: 43 %
MAGNESIUM SERPL-MCNC: 1.3 MG/DL
MCH RBC QN AUTO: 32 PG
MCHC RBC AUTO-ENTMCNC: 33.9 G/DL
MCV RBC AUTO: 94 FL
MONOCYTES # BLD AUTO: ABNORMAL K/UL
MONOCYTES NFR BLD: 41 %
MYELOCYTES NFR BLD MANUAL: 1 %
NEUTROPHILS NFR BLD: 4 %
NRBC BLD-RTO: 0 /100 WBC
OVALOCYTES BLD QL SMEAR: ABNORMAL
PHOSPHATE SERPL-MCNC: 3.4 MG/DL
PLATELET # BLD AUTO: 14 K/UL
PMV BLD AUTO: 11.7 FL
POIKILOCYTOSIS BLD QL SMEAR: SLIGHT
POLYCHROMASIA BLD QL SMEAR: ABNORMAL
POTASSIUM SERPL-SCNC: 3.6 MMOL/L
PROT SERPL-MCNC: 6.3 G/DL
RBC # BLD AUTO: 2.41 M/UL
SODIUM SERPL-SCNC: 138 MMOL/L
TARGETS BLD QL SMEAR: ABNORMAL
URATE SERPL-MCNC: 3.6 MG/DL
WBC # BLD AUTO: 2.28 K/UL

## 2018-05-29 PROCEDURE — 86850 RBC ANTIBODY SCREEN: CPT

## 2018-05-29 PROCEDURE — 20600001 HC STEP DOWN PRIVATE ROOM

## 2018-05-29 PROCEDURE — 63600175 PHARM REV CODE 636 W HCPCS: Mod: JG | Performed by: INTERNAL MEDICINE

## 2018-05-29 PROCEDURE — 99233 SBSQ HOSP IP/OBS HIGH 50: CPT | Mod: ,,, | Performed by: INTERNAL MEDICINE

## 2018-05-29 PROCEDURE — 86920 COMPATIBILITY TEST SPIN: CPT

## 2018-05-29 PROCEDURE — 63600175 PHARM REV CODE 636 W HCPCS: Performed by: HOSPITALIST

## 2018-05-29 PROCEDURE — 25000003 PHARM REV CODE 250: Performed by: INTERNAL MEDICINE

## 2018-05-29 PROCEDURE — 83735 ASSAY OF MAGNESIUM: CPT

## 2018-05-29 PROCEDURE — 84550 ASSAY OF BLOOD/URIC ACID: CPT

## 2018-05-29 PROCEDURE — 84100 ASSAY OF PHOSPHORUS: CPT

## 2018-05-29 PROCEDURE — 97165 OT EVAL LOW COMPLEX 30 MIN: CPT

## 2018-05-29 PROCEDURE — 97161 PT EVAL LOW COMPLEX 20 MIN: CPT

## 2018-05-29 PROCEDURE — 85027 COMPLETE CBC AUTOMATED: CPT

## 2018-05-29 PROCEDURE — 85007 BL SMEAR W/DIFF WBC COUNT: CPT

## 2018-05-29 PROCEDURE — XW043B3 INTRODUCTION OF CYTARABINE AND DAUNORUBICIN LIPOSOME ANTINEOPLASTIC INTO CENTRAL VEIN, PERCUTANEOUS APPROACH, NEW TECHNOLOGY GROUP 3: ICD-10-PCS | Performed by: INTERNAL MEDICINE

## 2018-05-29 PROCEDURE — 25000003 PHARM REV CODE 250: Performed by: STUDENT IN AN ORGANIZED HEALTH CARE EDUCATION/TRAINING PROGRAM

## 2018-05-29 PROCEDURE — 63600175 PHARM REV CODE 636 W HCPCS: Performed by: INTERNAL MEDICINE

## 2018-05-29 PROCEDURE — 80053 COMPREHEN METABOLIC PANEL: CPT

## 2018-05-29 RX ORDER — PROCHLORPERAZINE MALEATE 5 MG
10 TABLET ORAL EVERY 6 HOURS PRN
Status: DISCONTINUED | OUTPATIENT
Start: 2018-05-29 | End: 2018-06-27

## 2018-05-29 RX ORDER — ALPRAZOLAM 0.25 MG/1
0.25 TABLET ORAL 3 TIMES DAILY PRN
Status: DISCONTINUED | OUTPATIENT
Start: 2018-05-29 | End: 2018-07-13 | Stop reason: HOSPADM

## 2018-05-29 RX ORDER — FAMOTIDINE 20 MG/1
20 TABLET, FILM COATED ORAL 2 TIMES DAILY
Status: DISCONTINUED | OUTPATIENT
Start: 2018-05-29 | End: 2018-06-20

## 2018-05-29 RX ORDER — SODIUM CHLORIDE 9 MG/ML
INJECTION, SOLUTION INTRAVENOUS CONTINUOUS
Status: DISCONTINUED | OUTPATIENT
Start: 2018-05-29 | End: 2018-05-30

## 2018-05-29 RX ORDER — ONDANSETRON 4 MG/1
16 TABLET, FILM COATED ORAL
Status: COMPLETED | OUTPATIENT
Start: 2018-05-29 | End: 2018-06-04

## 2018-05-29 RX ORDER — SODIUM CHLORIDE 9 MG/ML
INJECTION, SOLUTION INTRAVENOUS CONTINUOUS
Status: DISCONTINUED | OUTPATIENT
Start: 2018-05-29 | End: 2018-05-29

## 2018-05-29 RX ORDER — VORICONAZOLE 200 MG/1
200 TABLET, FILM COATED ORAL 2 TIMES DAILY
Status: DISCONTINUED | OUTPATIENT
Start: 2018-06-02 | End: 2018-06-20

## 2018-05-29 RX ORDER — DEXAMETHASONE 4 MG/1
12 TABLET ORAL
Status: COMPLETED | OUTPATIENT
Start: 2018-05-29 | End: 2018-05-31

## 2018-05-29 RX ORDER — SODIUM CHLORIDE 0.9 % (FLUSH) 0.9 %
10 SYRINGE (ML) INJECTION
Status: DISCONTINUED | OUTPATIENT
Start: 2018-05-29 | End: 2018-05-29

## 2018-05-29 RX ORDER — BISACODYL 5 MG
5 TABLET, DELAYED RELEASE (ENTERIC COATED) ORAL DAILY PRN
Status: DISCONTINUED | OUTPATIENT
Start: 2018-05-29 | End: 2018-06-22

## 2018-05-29 RX ORDER — HEPARIN 100 UNIT/ML
300 SYRINGE INTRAVENOUS
Status: DISCONTINUED | OUTPATIENT
Start: 2018-05-29 | End: 2018-06-15

## 2018-05-29 RX ADMIN — ALPRAZOLAM 0.25 MG: 0.25 TABLET ORAL at 09:05

## 2018-05-29 RX ADMIN — ACETAMINOPHEN 650 MG: 325 TABLET, FILM COATED ORAL at 06:05

## 2018-05-29 RX ADMIN — SODIUM CHLORIDE: 0.9 INJECTION, SOLUTION INTRAVENOUS at 07:05

## 2018-05-29 RX ADMIN — ACETAMINOPHEN 650 MG: 325 TABLET, FILM COATED ORAL at 09:05

## 2018-05-29 RX ADMIN — SODIUM CHLORIDE 23 MG: 9 INJECTION, SOLUTION INTRAVENOUS at 12:05

## 2018-05-29 RX ADMIN — CEFEPIME HYDROCHLORIDE 2 G: 2 INJECTION, POWDER, FOR SOLUTION INTRAVENOUS at 02:05

## 2018-05-29 RX ADMIN — FAMOTIDINE 20 MG: 20 TABLET ORAL at 09:05

## 2018-05-29 RX ADMIN — POTASSIUM BICARBONATE 25 MEQ: 25 TABLET, EFFERVESCENT ORAL at 06:05

## 2018-05-29 RX ADMIN — ONDANSETRON 16 MG: 4 TABLET, FILM COATED ORAL at 11:05

## 2018-05-29 RX ADMIN — DEXAMETHASONE 12 MG: 4 TABLET ORAL at 11:05

## 2018-05-29 RX ADMIN — ACYCLOVIR 400 MG: 200 CAPSULE ORAL at 09:05

## 2018-05-29 RX ADMIN — CEFEPIME HYDROCHLORIDE 2 G: 2 INJECTION, POWDER, FOR SOLUTION INTRAVENOUS at 06:05

## 2018-05-29 RX ADMIN — CYTARABINE 190 MG: 100 INJECTION, SOLUTION INTRATHECAL; INTRAVENOUS; SUBCUTANEOUS at 12:05

## 2018-05-29 RX ADMIN — MAGNESIUM SULFATE HEPTAHYDRATE 1 G: 500 INJECTION, SOLUTION INTRAMUSCULAR; INTRAVENOUS at 08:05

## 2018-05-29 RX ADMIN — ESCITALOPRAM 10 MG: 5 TABLET, FILM COATED ORAL at 08:05

## 2018-05-29 RX ADMIN — CEFEPIME HYDROCHLORIDE 2 G: 2 INJECTION, POWDER, FOR SOLUTION INTRAVENOUS at 09:05

## 2018-05-29 RX ADMIN — SODIUM CHLORIDE: 0.9 INJECTION, SOLUTION INTRAVENOUS at 06:05

## 2018-05-29 RX ADMIN — SODIUM CHLORIDE: 0.9 INJECTION, SOLUTION INTRAVENOUS at 09:05

## 2018-05-29 RX ADMIN — ALPRAZOLAM 0.25 MG: 0.25 TABLET ORAL at 02:05

## 2018-05-29 RX ADMIN — ALLOPURINOL 300 MG: 300 TABLET ORAL at 08:05

## 2018-05-29 RX ADMIN — FAMOTIDINE 20 MG: 20 TABLET ORAL at 08:05

## 2018-05-29 RX ADMIN — ACYCLOVIR 400 MG: 200 CAPSULE ORAL at 08:05

## 2018-05-29 NOTE — PLAN OF CARE
Problem: Patient Care Overview  Goal: Plan of Care Review  Outcome: Ongoing (interventions implemented as appropriate)  POC reviewed with patient; understanding verbalized. Chemo and Magnesium administered this shift. NS at 50 and Cytarabine at 41.7. She C/O of anxiety and received PRN Xanax with full relief. Regular diet with fair appetite. Voids yellow urine in the hat; no BM this shift. Pt. with nonskid footwear on, bed in lowest position, and locked with bed rails up x2.  Pt. has call light and personal items within reach. VSS and afebrile this shift. All questions and concerns address at this time. Will continue to monitor.

## 2018-05-29 NOTE — ASSESSMENT & PLAN NOTE
- Admitted from [facility] ([facility]) on 5/25/18 early AM with WBC around 100s, for 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 Ordered routine labs in addition to FLT3, CEBPA, NPM1, NGS, and AML FISH.  - lo res HLA typing on 5/26/18 and hi res on 5/27/18 done in anticipation of possible need for future transplant   - Pt with two daughters, two full sisters (in their mid 60s, one with brain aneurysm hx, other one without any medical issues), and one full brother (59 y/o healthy).  - ECHO ordered 5/25/18, EF 65%  - PICC line placed 5/25/18   - initially on hydrea, discontinued on 5/28.   - Pt is an induction candidate. Will not screen for research trials. Will likely begin 7+3 chemo once pathology confirmed.  - flow cytometry: non-M3 AML with 25 Blasts.   - awaiting morphologic confirmation before proceeding with induction chemotherapy, consent obtained.    - WBC: 2.280/mm3 this morning.    - Will monitor for tumor lysis syndrome once chemotherapy starts, on allopurinol 300 mg daily and IVF at 50 cc/hr.   - prophylactic antimicrobials: Acyclovir, off Cipro as patient is on Cefepime for FN.   - will need CNS Prophylaxis given her elevated WBC (high risk) on admission after count recovery.

## 2018-05-29 NOTE — PLAN OF CARE
MDR's with Dr Tong.  Patient BMB confirms AML.  Plan to start 7+3 induction today.  The patient is aware of the plan or care and ELOS.  Will continue to follow.

## 2018-05-29 NOTE — PT/OT/SLP EVAL
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      Recommendations:     Discharge Recommendations: home  Discharge Equipment Recommendations:  none  Barriers to discharge:  None    History:     Occupational Profile:  Living Environment: lives in  home with small step entrance with   Previous level of function: independent  Roles and Routines: mom, grandmom, runs button business, retired [employer]  Equipment Owned:  none  Assistance upon Discharge: yes    Past Medical History:   Diagnosis Date    Hypertension        Past Surgical History:   Procedure Laterality Date    CHOLECYSTECTOMY      HYSTERECTOMY      TONSILLECTOMY         Subjective     Chief Complaint: none  Patient/Family stated goals: get started with chemo and get home  Communicated with: nsg prior to session.  Pain/Comfort:  · Pain Rating 1: 0/10    Patients cultural, spiritual, Alevism conflicts given the current situation: none    Objective:     Patient found with: peripheral IV    General Precautions: Standard, fall   Orthopedic Precautions:    Braces:       Occupational Performance:    Bed Mobility:    · independent    Functional Mobility/Transfers:  · Independent transfers  Functional Mobility: independent ambulating 200 ft without AD  Activities of Daily Living:  · independent    Cognitive/Visual Perceptual:  intact    Physical Exam:  wfl BUE and BLE    Patient left HOB elevated with all lines intact and call button in reach    [facility] 6 Click:  [facility] Total Score: 24    Treatment & Education:  Pt performed above activity, educated on POC, notify MD/nsg if any therapy needs arise, con't with OOB as much as tolerated.  Education:    Assessment:     Noreen Mac is a 68 y.o. 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  She presents with independent ADL performance.  Performance deficits  "affecting function are other (comment) (at risk for decreased ADL's/strength).      Rehab Prognosis:  good; patient would benefit from acute skilled OT services to address these deficits and reach maximum level of function.         Clinical Decision Makin.  OT Low:  "Pt evaluation falls under low complexity for evaluation coding due to performance deficits noted in 1-3 areas as stated above and 0 co-morbities affecting current functional status. Data obtained from problem focused assessments. No modifications or assistance was required for completion of evaluation. Only brief occupational profile and history review completed."     Plan:     Patient to be seen 1 x/week to address the above listed problems via self-care/home management, therapeutic activities, therapeutic exercises  · Plan of Care Expires: 18  · Plan of Care Reviewed with: patient    This Plan of care has been discussed with the patient who was involved in its development and understands and is in agreement with the identified goals and treatment plan    GOALS:    Occupational Therapy Goals        Problem: Occupational Therapy Goal    Goal Priority Disciplines Outcome Interventions   Occupational Therapy Goal     OT, PT/OT Ongoing (interventions implemented as appropriate)    Description:  Goals to be met by: 18     Patient will demonstrate no loss of functional independence with ADLs by performing basic ADL's at mod I/I level:                          Time Tracking:     OT Date of Treatment: 18  OT Start Time: 918  OT Stop Time: 933  OT Total Time (min): 15 min    Billable Minutes:Evaluation 15 min    Aimee Joiner OT  2018    "

## 2018-05-29 NOTE — PLAN OF CARE
Problem: Physical Therapy Goal  Goal: Physical Therapy Goal  Pt currently independent c functional mobility. Amb 200ft c no AD (I) - assistance c IV pole management.  PT will continue to follow pt 1x/wk while pt receives additional treatments to ensure pt's functional status does not decline.  POC will be revisited and updated accordingly.  Outcome: Ongoing (interventions implemented as appropriate)  Eval completed and POC established    Jose F Dunbar DPT  5/29/2018

## 2018-05-29 NOTE — ASSESSMENT & PLAN NOTE
- CXR suggestive of 7 mm slightly irregular shaped radiodensity projected over the left upper lung zone which could represent a calcified granuloma but remains indeterminate.  - CT Chest on 5/28: Multiple scattered noncalcified pulmonary granulomas, the largest measuring 8 mm in the lateral segment of the right middle lobe.   - repeat non-contrast chest CT at 6-12 months

## 2018-05-29 NOTE — ASSESSMENT & PLAN NOTE
- yesterday off IVF due to significant weight gain.   - noted to 2 L negative today.  - will start 50 cc/hr gentle hydration.   - replace Mg, K today.

## 2018-05-29 NOTE — PT/OT/SLP EVAL
"Physical Therapy Evaluation    Patient Name:  Noreen Mac   MRN:  [id number]    Recommendations:     Discharge Recommendations:  home   Discharge Equipment Recommendations: none   Barriers to discharge: None    Assessment:     Noreen Mac is a 68 y.o. 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  She presents with the following impairments/functional limitations:  other (comment) (potential decline in functional status d/t chemo treatments).  Pt demo independence c functional mobility.  Pt tolerated session c min c/o SOB.  Pt will continue to monitor pt while she receive additional treatments to ensure pt does not decline in functional status.  Pt would benefit from continued skilled acute PT 1x/wk.  Anticipating pt to be able to return home c no additional PT services required once medically cleared.    Rehab Prognosis:  good; patient would benefit from acute skilled PT services to address these deficits and reach maximum level of function.      Recent Surgery: * No surgery found *      Plan:     During this hospitalization, patient to be seen 1 x/week to address the above listed problems via gait training, therapeutic activities, therapeutic exercises, neuromuscular re-education  · Plan of Care Expires:  06/29/18   Plan of Care Reviewed with: patient    Subjective     Communicated with RN and OT prior to session.  Patient found sitting EOB upon PT entry to room, agreeable to evaluation.      Chief Complaint: SOB  Patient comments/goals: "I was supposed to receive chemo yesterday but my temperature was a little high."  Pain/Comfort:  · Pain Rating 1: 0/10    Patients cultural, spiritual, Mandaen conflicts given the current situation: none    Living Environment:  Pt lives c  in [facility].  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  Prior to admission, patients level of function was independent.  Patient " has the following equipment: none.  DME owned (not currently used): none.  Upon discharge, patient will have assistance from  as needed.    Objective:     Patient found with: peripheral IV     General Precautions: Standard, fall   Orthopedic Precautions:N/A   Braces: N/A     Exams:  · Cognitive Exam:  Patient is oriented to Person, Place, Time and Situation and follows 100% of all commands   · Gross Motor Coordination:  WFL  · Sensation:    · -       Intact  · RLE ROM: WFL  · RLE Strength: WFL  · LLE ROM: WFL  · LLE Strength: WFL    Functional Mobility:  · Bed Mobility:     · Rolling Right: independence  · Scooting: independence  · Supine to Sit: independence  · Sit to Supine: independence  · Transfers:     · Sit to Stand:  independence with no AD  · perf 1x from bed; 1x from bench  · Gait: 100ft x2 c no AD (I)  · No LOB, min c/o SOB requiring seated rest break x1min  · Balance: dynamic standing (I)    AM-PAC 6 CLICK MOBILITY  Total Score:24       Therapeutic Activities and Exercises:  Educated on PT role/POC; amb in halls at least 3x/day; benefits of OOB activities    Patient left HOB elevated with all lines intact, call button in reach and RN notified.    GOALS:    Physical Therapy Goals        Problem: Physical Therapy Goal    Goal Priority Disciplines Outcome Goal Variances Interventions   Physical Therapy Goal     PT/OT, PT Ongoing (interventions implemented as appropriate)     Description:  Pt currently independent c functional mobility. Amb 200ft c no AD (I) - assistance c IV pole management.  PT will continue to follow pt 1x/wk while pt receives additional treatments to ensure pt's functional status does not decline.  POC will be revisited and updated accordingly.                    History:     Past Medical History:   Diagnosis Date    Hypertension        Past Surgical History:   Procedure Laterality Date    CHOLECYSTECTOMY      HYSTERECTOMY      TONSILLECTOMY         Clinical Decision Making:      History  Co-morbidities and personal factors that may impact the plan of care Examination  Body Structures and Functions, activity limitations and participation restrictions that may impact the plan of care Clinical Presentation   Decision Making/ Complexity Score   Co-morbidities:   [] Time since onset of injury / illness / exacerbation  [] Status of current condition  []Patient's cognitive status and safety concerns    [] Multiple Medical Problems (see med hx)  Personal Factors:   [] Patient's age  [] Prior Level of function   [] Patient's home situation (environment and family support)  [] Patient's level of motivation  [] Expected progression of patient      HISTORY:(criteria)    [x] 90760 - no personal factors/history    [] 32311 - has 1-2 personal factor/comorbidity     [] 29943 - has >3 personal factor/comorbidity     Body Regions:  [] Objective examination findings  [] Head     []  Neck  [] Trunk   [] Upper Extremity  [] Lower Extremity    Body Systems:  [] For communication ability, affect, cognition, language, and learning style: the assessment of the ability to make needs known, consciousness, orientation (person, place, and time), expected emotional /behavioral responses, and learning preferences (eg, learning barriers, education  needs)  [] For the neuromuscular system: a general assessment of gross coordinated movement (eg, balance, gait, locomotion, transfers, and transitions) and motor function  (motor control and motor learning)  [] For the musculoskeletal system: the assessment of gross symmetry, gross range of motion, gross strength, height, and weight  [] For the integumentary system: the assessment of pliability(texture), presence of scar formation, skin color, and skin integrity  [] For cardiovascular/pulmonary system: the assessment of heart rate, respiratory rate, blood pressure, and edema     Activity limitations:    [] Patient's cognitive status and saf ety concerns          [] Status  of current condition      [] Weight bearing restriction  [] Cardiopulmunary Restriction    Participation Restrictions:   [] Goals and goal agreement with the patient     [] Rehab potential (prognosis) and probable outcome      Examination of Body System: (criteria)    [x] 70903 - addressing 1-2 elements    [] 58980 - addressing a total of 3 or more elements     [] 87522 -  Addressing a total of 4 or more elements         Clinical Presentation: (criteria)  Stable - 00112     On examination of body system using standardized tests and measures patient presents with 1-2 elements from any of the following: body structures and functions, activity limitations, and/or participation restrictions.  Leading to a clinical presentation that is considered stable and/or uncomplicated                              Clinical Decision Making  (Eval Complexity):  Low- 88263     Time Tracking:     PT Received On: 05/29/18  PT Start Time: 0920     PT Stop Time: 0933  PT Total Time (min): 13 min     Billable Minutes: Evaluation 13 min      Jose F Dunbar, PT  05/29/2018

## 2018-05-29 NOTE — ASSESSMENT & PLAN NOTE
- MCTD-NOS  - previously on MTX and Prednisone.   - off therapy now and otherwise doing well.   - can follow up outpatient with her Rheumatologist upon discharge.

## 2018-05-29 NOTE — CHAPLAIN
"Facts Very new diagnosis    Feelings (Emotions/Experiences/Coping): Is "at peace" and feels she has had a blessed life.       Family/Friends:  (MS) and 2 dtrs and 3 gr.children. (German Blount). So does not have people with her currently.      Rosita (Belief/Meaning/Philosophy): Pt does not blame God, does not see illness as part of God's plan, but just a random event. But she identifies God as her strength and support.    Future (Spiritual Care Plan of Action):Continue visiting for support and prayer.  "

## 2018-05-29 NOTE — PROGRESS NOTES
Chemotherapy Note:  Consent & CAR in chart. CAR & BSA verified by two chemo certified RNs, Abdon & Chiara. Echo done. Premedicated with Ondansetron 16mg and Dexamethasone 12mg PO. Idarubicin and Cytarabine and patient verified at bedside by two chemo certified RNs, Abdon & Chiara. Positive blood noted to right arm PICC. Idarubicin 23 mg in sodium chloride 0.9% 30mL administered to right arm PICC over 15 minutes. Cytarabine 190mg in sodium chloride 0.9% 18590 mL IVPB administered to right arm PICC over 24 hours at 41.7ml/hr. Chemotherapy precautions maintained & patient educated. Will continue to monitor.

## 2018-05-29 NOTE — PROGRESS NOTES
[facility]  Hematology  Bone Marrow Transplant  Progress Note    Patient Name: Noreen Mac  Admission Date: 5/24/2018  Hospital Length of Stay: 5 days  Code Status: Full Code    Subjective:     Interval History:     - spiked temp of 101 at noon yesterday, infectious work-up sent and started on Cefepime.   - remains afebrile since then and VSS stable.   - reports having bilateral temporal headaches, which are similar to ones she had last week on presentation to OSH. Tylenol is helping.   - remains anxious, but PRN xanax helps.   - (-) 2.1L.     Objective:     Vital Signs (Most Recent):  Temp: 99 °F (37.2 °C) (05/29/18 0300)  Pulse: 96 (05/29/18 0300)  Resp: 18 (05/29/18 0300)  BP: 129/60 (05/29/18 0300)  SpO2: 96 % (05/29/18 0300) Vital Signs (24h Range):  Temp:  [98.4 °F (36.9 °C)-101 °F (38.3 °C)] 99 °F (37.2 °C)  Pulse:  [83-99] 96  Resp:  [16-18] 18  SpO2:  [93 %-98 %] 96 %  BP: (128-146)/(59-77) 129/60     Weight: 79.3 kg (174 lb 13.2 oz)  Body mass index is 30.97 kg/m².  Body surface area is 1.88 meters squared.    ECOG SCORE         90% KPS    Intake/Output - Last 3 Shifts       05/27 0700 - 05/28 0659 05/28 0700 - 05/29 0659    P.O. 600 1200    I.V. (mL/kg) 10 (0.1) 2533 (31.9)    Blood 350     Total Intake(mL/kg) 960 (12.7) 3733 (47.1)    Urine (mL/kg/hr) 3700 (2) 3600 (1.9)    Total Output 3700 3600    Net -2740 +133          Urine Occurrence  2 x          Physical Exam   Constitutional: She is oriented to person, place, and time. Vital signs are normal. She is cooperative. No distress.   HENT:   Head: Normocephalic.   Eyes: Conjunctivae and EOM are normal.   Neck: Trachea normal and normal range of motion. Neck supple.   Cardiovascular: Normal rate, regular rhythm, S1 normal, S2 normal and intact distal pulses.    Pulmonary/Chest: Effort normal and breath sounds normal.   Abdominal: Soft. Normal appearance and bowel sounds are normal.   Musculoskeletal: Normal range of motion.    Neurological: She is alert and oriented to person, place, and time. Coordination and gait normal.   Skin: Skin is dry and intact. She is not diaphoretic. No pallor.   RUE PICC line with drsg C/D/I  Various small maculopapular lesions/rash to BLE (shins, R more than L). Per pt, this is improving   Psychiatric: She has a normal mood and affect. Her speech is normal.     Significant Labs:   CBC:     Recent Labs  Lab 05/28/18  0342 05/29/18  0311   WBC 3.82* 2.28*   HGB 7.0* 7.7*   HCT 20.3* 22.7*   PLT 24*  --    , CMP:     Recent Labs  Lab 05/28/18  0342 05/29/18  0311    138   K 3.7 3.6    106   CO2 23 26    99   BUN 13 11   CREATININE 0.8 0.8   CALCIUM 8.0* 8.3*   PROT 5.7* 6.3   ALBUMIN 2.7* 2.8*   BILITOT 1.3* 1.5*   ALKPHOS 170* 156*   AST 16 14   ALT 12 11   ANIONGAP 7* 6*   EGFRNONAA >60.0 >60.0    and Uric Acid     Recent Labs  Lab 05/28/18  0342 05/29/18  0311   URICACID 4.0 3.6       Diagnostic Results:  I have reviewed all pertinent imaging results/findings within the past 24 hours.   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 Ordered routine labs in addition to FLT3, CEBPA, NPM1, NGS, and AML FISH.  - lo res HLA typing on 5/26/18 and hi res on 5/27/18 done in anticipation of possible need for future transplant   - Pt with two daughters, two full sisters (in their mid 60s, one with brain aneurysm hx, other one without any medical issues), and one full brother (59 y/o healthy).  - ECHO ordered 5/25/18, EF 65%  - PICC line placed 5/25/18   - initially on hydrea, discontinued on 5/28.   - Pt is an induction candidate. Will not screen for research trials. Will likely begin 7+3 chemo once pathology confirmed.  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   - awaiting morphologic confirmation before proceeding with induction chemotherapy, consent obtained.    - WBC: 2.280/mm3 this morning.    - Will monitor for tumor lysis syndrome once chemotherapy starts, on allopurinol 300 mg daily and IVF at 50 cc/hr.   - prophylactic antimicrobials: Acyclovir, off Cipro as patient is on Cefepime for FN.   - will need CNS Prophylaxis given her elevated WBC (high risk) on admission after count recovery.               MCTD (mixed connective tissue disease)    - MCTD-NOS  - previously on MTX and Prednisone.   - off therapy now and otherwise doing well.   - can follow up outpatient with her Rheumatologist upon discharge.         Alteration in electrolyte and fluid balance    - yesterday off IVF due to significant weight gain.   - noted to 2 L negative today.  - will start 50 cc/hr gentle hydration.   - replace Mg, K today.         Bilateral headaches    - bilateral temporal headaches  - similar to ones on presentation at OSH  - CT Head done at OSH was unremarkable.   - will consider re-imaging if deemed necessary.   - PRN Tylenol for now.         Neutropenic fever    - spiked temp on 5/29 of 102, afebrile since then.   - blood cultures no growth, CXR and UA unremarkable.   - on Cefepime Day 2.   - will continue antibiotics for another day and switch to cipro tomorrow if no fevers/cultures negative.         Pulmonary nodule    - CXR suggestive of 7 mm slightly irregular shaped radiodensity projected over the left upper lung zone which could represent a calcified granuloma but remains indeterminate.  - CT Chest on 5/28: Multiple scattered noncalcified pulmonary granulomas, the largest measuring 8 mm in the lateral segment of the right middle lobe.   - repeat non-contrast chest CT at 6-12 months         Insomnia    - PRN nightly Xanax.         Rash    - rash to BLE (shins)  - states it occurred mid April 2018 after 3rd dose of prolia  - possible  that rash is from AML  - improving on own, will defer bx at this time and continue to monitor        HTN (hypertension)    - takes metoprolol at home  - will hold at this time as HR/BP are controlled; plan to restart if HR/BP rise  - continue to monitor        Electrolyte abnormality    - will replace electrolytes as needed.          GERD (gastroesophageal reflux disease)    - takes Omeprazole at home, now on pepcid here  - no issue         Osteoporosis    - takes prolia once every 6 months, last given mid April 2018   - no acute issue         Depression    - continue home Escitalopram   - no acute issue   - with associated anxiety--takes xanax at home but unaware of dose. Started on xanax 0.25 mg bid prn, can increase if needed            VTE Risk Mitigation         Ordered     Place MAYLIN riverae  Until discontinued      05/25/18 0032     IP VTE HIGH RISK PATIENT  Once      05/25/18 0032          Disposition: awaiting start of induction chemotherapy.     Mathew Rapp MD  Bone Marrow Transplant  [facility]

## 2018-05-29 NOTE — ASSESSMENT & PLAN NOTE
- bilateral temporal headaches  - similar to ones on presentation at OSH  - CT Head done at OSH was unremarkable.   - will consider re-imaging if deemed necessary.   - PRN Tylenol for now.

## 2018-05-29 NOTE — SUBJECTIVE & OBJECTIVE
Subjective:     Interval History:     - spiked temp of 101 at noon yesterday, infectious work-up sent and started on Cefepime.   - remains afebrile since then and VSS stable.   - reports having bilateral temporal headaches, which are similar to ones she had last week on presentation to OSH. Tylenol is helping.   - remains anxious, but PRN xanax helps.   - (-) 2.1L.     Objective:     Vital Signs (Most Recent):  Temp: 99 °F (37.2 °C) (05/29/18 0300)  Pulse: 96 (05/29/18 0300)  Resp: 18 (05/29/18 0300)  BP: 129/60 (05/29/18 0300)  SpO2: 96 % (05/29/18 0300) Vital Signs (24h Range):  Temp:  [98.4 °F (36.9 °C)-101 °F (38.3 °C)] 99 °F (37.2 °C)  Pulse:  [83-99] 96  Resp:  [16-18] 18  SpO2:  [93 %-98 %] 96 %  BP: (128-146)/(59-77) 129/60     Weight: 79.3 kg (174 lb 13.2 oz)  Body mass index is 30.97 kg/m².  Body surface area is 1.88 meters squared.    ECOG SCORE         90% KPS    Intake/Output - Last 3 Shifts       05/27 0700 - 05/28 0659 05/28 0700 - 05/29 0659    P.O. 600 1200    I.V. (mL/kg) 10 (0.1) 2533 (31.9)    Blood 350     Total Intake(mL/kg) 960 (12.7) 3733 (47.1)    Urine (mL/kg/hr) 3700 (2) 3600 (1.9)    Total Output 3700 3600    Net -2740 +133          Urine Occurrence  2 x          Physical Exam   Constitutional: She is oriented to person, place, and time. Vital signs are normal. She is cooperative. No distress.   HENT:   Head: Normocephalic.   Eyes: Conjunctivae and EOM are normal.   Neck: Trachea normal and normal range of motion. Neck supple.   Cardiovascular: Normal rate, regular rhythm, S1 normal, S2 normal and intact distal pulses.    Pulmonary/Chest: Effort normal and breath sounds normal.   Abdominal: Soft. Normal appearance and bowel sounds are normal.   Musculoskeletal: Normal range of motion.   Neurological: She is alert and oriented to person, place, and time. Coordination and gait normal.   Skin: Skin is dry and intact. She is not diaphoretic. No pallor.   RUE PICC line with drsg  C/D/I  Various small maculopapular lesions/rash to BLE (shins, R more than L). Per pt, this is improving   Psychiatric: She has a normal mood and affect. Her speech is normal.     Significant Labs:   CBC:     Recent Labs  Lab 05/28/18  0342 05/29/18  0311   WBC 3.82* 2.28*   HGB 7.0* 7.7*   HCT 20.3* 22.7*   PLT 24*  --    , CMP:     Recent Labs  Lab 05/28/18  0342 05/29/18  0311    138   K 3.7 3.6    106   CO2 23 26    99   BUN 13 11   CREATININE 0.8 0.8   CALCIUM 8.0* 8.3*   PROT 5.7* 6.3   ALBUMIN 2.7* 2.8*   BILITOT 1.3* 1.5*   ALKPHOS 170* 156*   AST 16 14   ALT 12 11   ANIONGAP 7* 6*   EGFRNONAA >60.0 >60.0    and Uric Acid     Recent Labs  Lab 05/28/18 0342 05/29/18  0311   URICACID 4.0 3.6       Diagnostic Results:  I have reviewed all pertinent imaging results/findings within the past 24 hours.   ECHO 5/25/18, ordered, EF 65%

## 2018-05-29 NOTE — PLAN OF CARE
Problem: Occupational Therapy Goal  Goal: Occupational Therapy Goal  Goals to be met by: 6/18/18     Patient will demonstrate no loss of functional independence with ADLs by performing basic ADL's at mod I/I level:        Outcome: Ongoing (interventions implemented as appropriate)  Goal established  Aimee Joiner, OTR

## 2018-05-29 NOTE — ASSESSMENT & PLAN NOTE
- spiked temp on 5/29 of 102, afebrile since then.   - blood cultures no growth, CXR and UA unremarkable.   - on Cefepime Day 2.   - will continue antibiotics for another day and switch to cipro tomorrow if no fevers/cultures negative.

## 2018-05-30 PROBLEM — D61.818 PANCYTOPENIA: Status: ACTIVE | Noted: 2018-05-30

## 2018-05-30 LAB
ALBUMIN SERPL BCP-MCNC: 2.7 G/DL
ALP SERPL-CCNC: 132 U/L
ALT SERPL W/O P-5'-P-CCNC: 8 U/L
ANION GAP SERPL CALC-SCNC: 7 MMOL/L
ANISOCYTOSIS BLD QL SMEAR: SLIGHT
AST SERPL-CCNC: 11 U/L
BASOPHILS # BLD AUTO: ABNORMAL K/UL
BASOPHILS NFR BLD: 0 %
BILIRUB SERPL-MCNC: 0.8 MG/DL
BLASTS NFR BLD MANUAL: 2 %
BUN SERPL-MCNC: 15 MG/DL
CALCIUM SERPL-MCNC: 8.1 MG/DL
CEBPA SEQUENCING (BM): NORMAL
CHLORIDE SERPL-SCNC: 110 MMOL/L
CO2 SERPL-SCNC: 22 MMOL/L
CREAT SERPL-MCNC: 0.7 MG/DL
DIFFERENTIAL METHOD: ABNORMAL
EOSINOPHIL # BLD AUTO: ABNORMAL K/UL
EOSINOPHIL NFR BLD: 0 %
ERYTHROCYTE [DISTWIDTH] IN BLOOD BY AUTOMATED COUNT: 15.9 %
EST. GFR  (AFRICAN AMERICAN): >60 ML/MIN/1.73 M^2
EST. GFR  (NON AFRICAN AMERICAN): >60 ML/MIN/1.73 M^2
GLUCOSE SERPL-MCNC: 133 MG/DL
HCT VFR BLD AUTO: 21.2 %
HGB BLD-MCNC: 7.1 G/DL
HYPOCHROMIA BLD QL SMEAR: ABNORMAL
IMM GRANULOCYTES # BLD AUTO: ABNORMAL K/UL
IMM GRANULOCYTES NFR BLD AUTO: ABNORMAL %
LYMPHOCYTES # BLD AUTO: ABNORMAL K/UL
LYMPHOCYTES NFR BLD: 68 %
MAGNESIUM SERPL-MCNC: 1.7 MG/DL
MCH RBC QN AUTO: 31.8 PG
MCHC RBC AUTO-ENTMCNC: 33.5 G/DL
MCV RBC AUTO: 95 FL
MONOCYTES # BLD AUTO: ABNORMAL K/UL
MONOCYTES NFR BLD: 18 %
NEUTROPHILS NFR BLD: 12 %
NPM1 FINAL DIAGNOSIS (BONE MARROW): NORMAL
NPM1 SPECIMEN TYPE (BONE MARROW): NORMAL
NRBC BLD-RTO: 0 /100 WBC
PHOSPHATE SERPL-MCNC: 3.2 MG/DL
PLATELET # BLD AUTO: 11 K/UL
PLATELET BLD QL SMEAR: ABNORMAL
PMV BLD AUTO: 12.4 FL
POTASSIUM SERPL-SCNC: 4.2 MMOL/L
PROT SERPL-MCNC: 6.3 G/DL
RBC # BLD AUTO: 2.23 M/UL
SODIUM SERPL-SCNC: 139 MMOL/L
SPECIMEN TYPE: NORMAL
URATE SERPL-MCNC: 2.9 MG/DL
WBC # BLD AUTO: 0.49 K/UL

## 2018-05-30 PROCEDURE — 84100 ASSAY OF PHOSPHORUS: CPT

## 2018-05-30 PROCEDURE — 25000003 PHARM REV CODE 250: Performed by: INTERNAL MEDICINE

## 2018-05-30 PROCEDURE — 63600175 PHARM REV CODE 636 W HCPCS: Performed by: HOSPITALIST

## 2018-05-30 PROCEDURE — 63600175 PHARM REV CODE 636 W HCPCS: Mod: JG | Performed by: INTERNAL MEDICINE

## 2018-05-30 PROCEDURE — 80053 COMPREHEN METABOLIC PANEL: CPT

## 2018-05-30 PROCEDURE — 84550 ASSAY OF BLOOD/URIC ACID: CPT

## 2018-05-30 PROCEDURE — 25000003 PHARM REV CODE 250: Performed by: STUDENT IN AN ORGANIZED HEALTH CARE EDUCATION/TRAINING PROGRAM

## 2018-05-30 PROCEDURE — 85007 BL SMEAR W/DIFF WBC COUNT: CPT

## 2018-05-30 PROCEDURE — 99233 SBSQ HOSP IP/OBS HIGH 50: CPT | Mod: ,,, | Performed by: INTERNAL MEDICINE

## 2018-05-30 PROCEDURE — 20600001 HC STEP DOWN PRIVATE ROOM

## 2018-05-30 PROCEDURE — 85027 COMPLETE CBC AUTOMATED: CPT

## 2018-05-30 PROCEDURE — 83735 ASSAY OF MAGNESIUM: CPT

## 2018-05-30 RX ORDER — LANOLIN ALCOHOL/MO/W.PET/CERES
400 CREAM (GRAM) TOPICAL ONCE
Status: COMPLETED | OUTPATIENT
Start: 2018-05-30 | End: 2018-05-30

## 2018-05-30 RX ORDER — CIPROFLOXACIN 500 MG/1
500 TABLET ORAL EVERY 12 HOURS
Status: DISCONTINUED | OUTPATIENT
Start: 2018-05-30 | End: 2018-06-11

## 2018-05-30 RX ADMIN — ALPRAZOLAM 0.25 MG: 0.25 TABLET ORAL at 08:05

## 2018-05-30 RX ADMIN — CEFEPIME HYDROCHLORIDE 2 G: 2 INJECTION, POWDER, FOR SOLUTION INTRAVENOUS at 06:05

## 2018-05-30 RX ADMIN — CIPROFLOXACIN HYDROCHLORIDE 500 MG: 500 TABLET, FILM COATED ORAL at 08:05

## 2018-05-30 RX ADMIN — ACYCLOVIR 400 MG: 200 CAPSULE ORAL at 08:05

## 2018-05-30 RX ADMIN — FAMOTIDINE 20 MG: 20 TABLET ORAL at 08:05

## 2018-05-30 RX ADMIN — ONDANSETRON 16 MG: 4 TABLET, FILM COATED ORAL at 11:05

## 2018-05-30 RX ADMIN — ALPRAZOLAM 0.25 MG: 0.25 TABLET ORAL at 10:05

## 2018-05-30 RX ADMIN — Medication 400 MG: at 08:05

## 2018-05-30 RX ADMIN — ALLOPURINOL 300 MG: 300 TABLET ORAL at 08:05

## 2018-05-30 RX ADMIN — DEXAMETHASONE 12 MG: 4 TABLET ORAL at 11:05

## 2018-05-30 RX ADMIN — CYTARABINE 190 MG: 100 INJECTION, SOLUTION INTRATHECAL; INTRAVENOUS; SUBCUTANEOUS at 12:05

## 2018-05-30 RX ADMIN — METOPROLOL SUCCINATE 12.5 MG: 25 TABLET, EXTENDED RELEASE ORAL at 08:05

## 2018-05-30 RX ADMIN — ESCITALOPRAM 10 MG: 5 TABLET, FILM COATED ORAL at 08:05

## 2018-05-30 RX ADMIN — SODIUM CHLORIDE 23 MG: 9 INJECTION, SOLUTION INTRAVENOUS at 12:05

## 2018-05-30 NOTE — ASSESSMENT & PLAN NOTE
- history of SVT   - restarted home metoprolol with dose reduction to 12.5 mg QD.   - continue to monitor

## 2018-05-30 NOTE — PHYSICIAN QUERY
PT Name: Noreen Mac  MR #: [id number]     Physician Query Form - Etiology of Condition Clarification      Madonna Harmon RN CDS    Contact Information: [phone]    This form is a permanent document in the medical record.     Query Date: May 30, 2018    By submitting this query, we are merely seeking further clarification of documentation.  Please utilize your independent clinical judgment when addressing the question(s) below.     The medical record contains the following:    Findings Supporting Clinical Information Location in Medical Record   Neutropenia Acute leukemia - WBC around 100s   - daily CBC   - will need Bone marrow biopsy   - monitor for tumor lysis syndrome   - start on IVF     Neutropenic fever - spiked temp on 5/29 of 102, afebrile since then.   - blood cultures no growth, CXR and UA unremarkable.   - on Cefepime Day 2.     Bone marrow path confirmed nonm3 AML. CG/molecular studies pending. Will need IT sampling/chemo at count recovery given presenting wbc count. 7+3 initiated 5/29/18. 1 time fever resolved     Pancytopenia - secondary to AML and &chemotherapy.   - wbc: 490/mm3 with ANC: 59/mm3, Hb: 7.1 g/dL, and Plt: 11,000/mm3.    5/25 H/P              5/29 Bone Marrow Transplant PN          5/30 Bone Marrow Transplant PN         Please document your best medical opinion regarding the etiology of __Neutropenia_______________ for which the primary focus of treatment is/was directed.         Due to chemotherapy and leukemia  ____________________________              [    ]  Clinically Undetermined    Please document in your progress notes daily for the duration of treatment, until resolved, and include in your discharge summary.

## 2018-05-30 NOTE — PROGRESS NOTES
Consent & CAR in chart. CAR & BSA verified by two chemo certified RNs, Abdon & Amaya. Echo done. Premedicated with Ondansetron 16mg and Dexamethasone 12mg PO. Idarubicin and Cytarabine and patient verified at bedside by two chemo certified RNs, Abdon & Amaya. Positive blood noted to right arm PICC. Idarubicin 23 mg in sodium chloride 0.9% 30mL administered to right arm PICC over 15 minutes. Cytarabine 190mg in sodium chloride 0.9% 83119 mL IVPB administered to right arm PICC over 24 hours at 41.7ml/hr. Chemotherapy precautions maintained & patient educated. Will continue to monitor.

## 2018-05-30 NOTE — PROGRESS NOTES
[facility]  Hematology  Bone Marrow Transplant  Progress Note    Patient Name: Noreen Mac  Admission Date: 5/24/2018  Hospital Length of Stay: 6 days  Code Status: Full Code    Subjective:     Interval History:     - Day 2 of induction chemotherapy today.  - tolerating therapy well thus far.    - remains afebrile and VSS stable. Metoprolol held due to low BP.   - headaches better now.   - reports minimal blood when sneezing on tissue.   - remains anxious, but PRN xanax helps.   - (-) 2.1L. Weight down by 0.4 kg.    - seen by PT.     Objective:     Vital Signs (Most Recent):  Temp: 97.9 °F (36.6 °C) (05/30/18 0721)  Pulse: 80 (05/30/18 0721)  Resp: 18 (05/30/18 0721)  BP: 127/60 (05/30/18 0721)  SpO2: 95 % (05/30/18 0721) Vital Signs (24h Range):  Temp:  [97.9 °F (36.6 °C)-99 °F (37.2 °C)] 97.9 °F (36.6 °C)  Pulse:  [67-96] 80  Resp:  [18-19] 18  SpO2:  [94 %-96 %] 95 %  BP: (109-127)/(57-67) 127/60     Weight: 75.4 kg (166 lb 3.6 oz)  Body mass index is 29.45 kg/m².  Body surface area is 1.83 meters squared.    ECOG SCORE         90% KPS    Intake/Output - Last 3 Shifts       05/28 0700 - 05/29 0659 05/29 0700 - 05/30 0659 05/30 0700 - 05/31 0659    P.O. 1200 1000     I.V. (mL/kg) 2533 (31.9) 733.3 (9.7) 706 (9.4)    IV Piggyback  408.6 610    Total Intake(mL/kg) 3733 (47.1) 2142 (28.3) 1316 (17.5)    Urine (mL/kg/hr) 3600 (1.9) 2350 (1.3) 1400 (20.4)    Total Output 3600 2350 1400    Net +133 -208 -84           Urine Occurrence 2 x      Stool Occurrence  0 x 1 x          Physical Exam   Constitutional: She is oriented to person, place, and time. Vital signs are normal. She is cooperative. No distress.   HENT:   Head: Normocephalic.   Erythema noted on back of her throat.    Eyes: Conjunctivae and EOM are normal.   Neck: Trachea normal and normal range of motion. Neck supple.   Cardiovascular: Normal rate, regular rhythm, S1 normal, S2 normal and intact distal pulses.    Pulmonary/Chest:  Effort normal and breath sounds normal.   Abdominal: Soft. Normal appearance and bowel sounds are normal.   Musculoskeletal: Normal range of motion.   Neurological: She is alert and oriented to person, place, and time. Coordination and gait normal.   Skin: Skin is dry and intact. She is not diaphoretic. No pallor.   RUE PICC line with drsg C/D/I  Various small maculopapular lesions/rash to BLE (shins, R more than L). Per pt, this is improving   Psychiatric: She has a normal mood and affect. Her speech is normal.     Significant Labs:   CBC:     Recent Labs  Lab 05/29/18 0311 05/30/18  0342   WBC 2.28* 0.49*   HGB 7.7* 7.1*   HCT 22.7* 21.2*   PLT 14* 11*   , CMP:     Recent Labs  Lab 05/29/18 0311 05/30/18  0342    139   K 3.6 4.2    110   CO2 26 22*   GLU 99 133*   BUN 11 15   CREATININE 0.8 0.7   CALCIUM 8.3* 8.1*   PROT 6.3 6.3   ALBUMIN 2.8* 2.7*   BILITOT 1.5* 0.8   ALKPHOS 156* 132   AST 14 11   ALT 11 8*   ANIONGAP 6* 7*   EGFRNONAA >60.0 >60.0    and Uric Acid     Recent Labs  Lab 05/29/18 0311 05/30/18  0342   URICACID 3.6 2.9       Diagnostic Results:  I have reviewed all pertinent imaging results/findings within the past 24 hours.   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 Ordered routine labs in addition to FLT3, CEBPA, NPM1, NGS, and AML FISH.  - lo res HLA typing on 5/26/18 and hi res on 5/27/18 done in anticipation of possible need for future transplant   - Pt with two daughters, two full sisters (in their mid 60s, one with brain aneurysm hx, other one without any medical issues), and one full brother (61 y/o healthy).  - ECHO ordered 5/25/18, EF 65%  - PICC line placed 5/25/18   - initially on hydrea, discontinued on  5/28.   - Pt is an induction candidate. Will not screen for research trials. Will likely begin 7+3 chemo once pathology confirmed.  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 Awaiting molecular studies.   - started on induction therapy on 5/29/18.  - Day 2 since start of induction therapy.    - monitor for tumor lysis syndrome while receiving chemotherapy, on allopurinol 300 mg daily.   - prophylactic antimicrobials: Acyclovir, will switch him to Cipro from Cefepime now since no fevers in last 24 hrs and cultures negative.   - will need CNS Prophylaxis given her elevated WBC (high risk) on admission after count recovery.               Pancytopenia    - secondary to AML and chemotherapy.   - wbc: 490/mm3 with ANC: 59/mm3, Hb: 7.1 g/dL, and Plt: 11,000/mm3.   - tranfuse if Hb < 7 g/dL and Plt < 10,000/mm3 or active bleed.           MCTD (mixed connective tissue disease)    - MCTD-NOS  - previously on MTX and Prednisone.   - off therapy now and otherwise doing well.   - can follow up outpatient with her Rheumatologist upon discharge.         Alteration in electrolyte and fluid balance    - (-) 200 cc, weight down 0.4 kg.   - d/c IVFs today.   - replace Mg today.         Bilateral headaches    - bilateral temporal headaches  - similar to ones on presentation at OSH  - CT Head done at OSH was unremarkable.   - will consider re-imaging if deemed necessary.   - PRN Tylenol for now.         Neutropenic fever    - spiked temp on 5/29 of 102, afebrile since then.   - blood cultures no growth, CXR and UA unremarkable.   - on Cefepime Day 3.   - will discontinue antibiotics today and switch to cipro.         Pulmonary nodule    - CXR suggestive of 7 mm slightly irregular shaped radiodensity projected over the left upper lung zone which could represent a calcified granuloma but remains indeterminate.  - CT Chest on 5/28: Multiple scattered noncalcified pulmonary granulomas, the largest measuring 8 mm in the  lateral segment of the right middle lobe.   - repeat non-contrast chest CT at 6-12 months         Insomnia    - PRN nightly Xanax.         Rash    - rash to BLE (shins)  - states it occurred mid April 2018 after 3rd dose of prolia  - possible that rash is from AML  - improving on own, will defer bx at this time and continue to monitor        HTN (hypertension)    - history of SVT   - restarted home metoprolol with dose reduction to 12.5 mg QD.   - continue to monitor        Electrolyte abnormality    - will replace electrolytes as needed.          GERD (gastroesophageal reflux disease)    - takes Omeprazole at home, now on pepcid here  - no issue         Osteoporosis    - takes prolia once every 6 months, last given mid April 2018   - no acute issue         Depression    - continue home Escitalopram   - no acute issue   - with associated anxiety--takes xanax at home but unaware of dose. Started on xanax 0.25 mg bid prn, can increase if needed            VTE Risk Mitigation         Ordered     heparin, porcine (PF) 100 unit/mL injection flush 300 Units  As needed (PRN)      05/29/18 1024     Place MAYLIN hose  Until discontinued      05/25/18 0032     IP VTE HIGH RISK PATIENT  Once      05/25/18 0032          Disposition: undergoing induction chemotherapy.     Mathew Rapp MD  Bone Marrow Transplant  [facility]

## 2018-05-30 NOTE — ASSESSMENT & PLAN NOTE
- Admitted from [facility] on 5/25/18 early AM with WBC around 100s, for 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 Ordered routine labs in addition to FLT3, CEBPA, NPM1, NGS, and AML FISH.  - lo res HLA typing on 5/26/18 and hi res on 5/27/18 done in anticipation of possible need for future transplant   - Pt with two daughters, two full sisters (in their mid 60s, one with brain aneurysm hx, other one without any medical issues), and one full brother (59 y/o healthy).  - ECHO ordered 5/25/18, EF 65%  - PICC line placed 5/25/18   - initially on hydrea, discontinued on 5/28.   - Pt is an induction candidate. Will not screen for research trials. Will likely begin 7+3 chemo once pathology confirmed.  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 Awaiting molecular studies.   - started on induction therapy on 5/29/18.  - Day 2 since start of induction therapy.    - monitor for tumor lysis syndrome while receiving chemotherapy, on allopurinol 300 mg daily.   - prophylactic antimicrobials: Acyclovir, will switch him to Cipro from Cefepime now since no fevers in last 24 hrs and cultures negative.   - will need CNS Prophylaxis given her elevated WBC (high risk) on admission after count recovery.

## 2018-05-30 NOTE — SUBJECTIVE & OBJECTIVE
Subjective:     Interval History:     - Day 2 of induction chemotherapy today.  - tolerating therapy well thus far.    - remains afebrile and VSS stable. Metoprolol held due to low BP.   - headaches better now.   - reports minimal blood when sneezing on tissue.   - remains anxious, but PRN xanax helps.   - (-) 2.1L. Weight down by 0.4 kg.    - seen by PT.     Objective:     Vital Signs (Most Recent):  Temp: 97.9 °F (36.6 °C) (05/30/18 0721)  Pulse: 80 (05/30/18 0721)  Resp: 18 (05/30/18 0721)  BP: 127/60 (05/30/18 0721)  SpO2: 95 % (05/30/18 0721) Vital Signs (24h Range):  Temp:  [97.9 °F (36.6 °C)-99 °F (37.2 °C)] 97.9 °F (36.6 °C)  Pulse:  [67-96] 80  Resp:  [18-19] 18  SpO2:  [94 %-96 %] 95 %  BP: (109-127)/(57-67) 127/60     Weight: 75.4 kg (166 lb 3.6 oz)  Body mass index is 29.45 kg/m².  Body surface area is 1.83 meters squared.    ECOG SCORE         90% KPS    Intake/Output - Last 3 Shifts       05/28 0700 - 05/29 0659 05/29 0700 - 05/30 0659 05/30 0700 - 05/31 0659    P.O. 1200 1000     I.V. (mL/kg) 2533 (31.9) 733.3 (9.7) 706 (9.4)    IV Piggyback  408.6 610    Total Intake(mL/kg) 3733 (47.1) 2142 (28.3) 1316 (17.5)    Urine (mL/kg/hr) 3600 (1.9) 2350 (1.3) 1400 (20.4)    Total Output 3600 2350 1400    Net +133 -208 -84           Urine Occurrence 2 x      Stool Occurrence  0 x 1 x          Physical Exam   Constitutional: She is oriented to person, place, and time. Vital signs are normal. She is cooperative. No distress.   HENT:   Head: Normocephalic.   Erythema noted on back of her throat.    Eyes: Conjunctivae and EOM are normal.   Neck: Trachea normal and normal range of motion. Neck supple.   Cardiovascular: Normal rate, regular rhythm, S1 normal, S2 normal and intact distal pulses.    Pulmonary/Chest: Effort normal and breath sounds normal.   Abdominal: Soft. Normal appearance and bowel sounds are normal.   Musculoskeletal: Normal range of motion.   Neurological: She is alert and oriented to person,  place, and time. Coordination and gait normal.   Skin: Skin is dry and intact. She is not diaphoretic. No pallor.   RUE PICC line with drsg C/D/I  Various small maculopapular lesions/rash to BLE (shins, R more than L). Per pt, this is improving   Psychiatric: She has a normal mood and affect. Her speech is normal.     Significant Labs:   CBC:     Recent Labs  Lab 05/29/18 0311 05/30/18  0342   WBC 2.28* 0.49*   HGB 7.7* 7.1*   HCT 22.7* 21.2*   PLT 14* 11*   , CMP:     Recent Labs  Lab 05/29/18 0311 05/30/18  0342    139   K 3.6 4.2    110   CO2 26 22*   GLU 99 133*   BUN 11 15   CREATININE 0.8 0.7   CALCIUM 8.3* 8.1*   PROT 6.3 6.3   ALBUMIN 2.8* 2.7*   BILITOT 1.5* 0.8   ALKPHOS 156* 132   AST 14 11   ALT 11 8*   ANIONGAP 6* 7*   EGFRNONAA >60.0 >60.0    and Uric Acid     Recent Labs  Lab 05/29/18 0311 05/30/18  0342   URICACID 3.6 2.9       Diagnostic Results:  I have reviewed all pertinent imaging results/findings within the past 24 hours.   ECHO 5/25/18, ordered, EF 65%

## 2018-05-30 NOTE — PLAN OF CARE
Problem: Patient Care Overview  Goal: Plan of Care Review  Outcome: Ongoing (interventions implemented as appropriate)  POC reviewed with patient; understanding verbalized. Chemo and Magnesium administered this shift. Cytarabine at 41.7. She C/O of anxiety and received PRN Xanax with full relief. Nasal spray at the bedside. Regular diet with fair appetite. Voids yellow urine in the hat; one BM this shift. Pt. with nonskid footwear on, bed in lowest position, and locked with bed rails up x2.  Pt. has call light and personal items within reach. VSS and afebrile this shift. All questions and concerns address at this time. Will continue to monitor.

## 2018-05-30 NOTE — ASSESSMENT & PLAN NOTE
- secondary to AML and chemotherapy.   - wbc: 490/mm3 with ANC: 59/mm3, Hb: 7.1 g/dL, and Plt: 11,000/mm3.   - tranfuse if Hb < 7 g/dL and Plt < 10,000/mm3 or active bleed.

## 2018-05-30 NOTE — ASSESSMENT & PLAN NOTE
- spiked temp on 5/29 of 102, afebrile since then.   - blood cultures no growth, CXR and UA unremarkable.   - on Cefepime Day 3.   - will discontinue antibiotics today and switch to cipro.

## 2018-05-31 LAB
ALBUMIN SERPL BCP-MCNC: 2.7 G/DL
ALP SERPL-CCNC: 119 U/L
ALT SERPL W/O P-5'-P-CCNC: 9 U/L
AML FISH ADDITIONAL INFORMATION (BM): NORMAL
AML FISH DISCLAIMER (BM): NORMAL
AML FISH REASON FOR REFERRAL (BM): NORMAL
AML FISH RELEASED BY (BM): NORMAL
AML FISH RESULT (BM): NORMAL
AML FISH RESULT SUMMARY (BM): NORMAL
AML FISH RESULT TABLE (BM): NORMAL
ANION GAP SERPL CALC-SCNC: 7 MMOL/L
ANISOCYTOSIS BLD QL SMEAR: SLIGHT
APTT BLDCRRT: <21 SEC
AST SERPL-CCNC: 12 U/L
BASOPHILS # BLD AUTO: 0 K/UL
BASOPHILS NFR BLD: 0 %
BILIRUB SERPL-MCNC: 1.1 MG/DL
BLD PROD TYP BPU: NORMAL
BLOOD UNIT EXPIRATION DATE: NORMAL
BLOOD UNIT TYPE CODE: 6200
BLOOD UNIT TYPE: NORMAL
BUN SERPL-MCNC: 20 MG/DL
CALCIUM SERPL-MCNC: 8.1 MG/DL
CHLORIDE SERPL-SCNC: 110 MMOL/L
CLINICAL CYTOGENETICIST REVIEW: NORMAL
CO2 SERPL-SCNC: 22 MMOL/L
CODING SYSTEM: NORMAL
CREAT SERPL-MCNC: 0.7 MG/DL
DIFFERENTIAL METHOD: ABNORMAL
DISPENSE STATUS: NORMAL
EOSINOPHIL # BLD AUTO: 0 K/UL
EOSINOPHIL NFR BLD: 0 %
ERYTHROCYTE [DISTWIDTH] IN BLOOD BY AUTOMATED COUNT: 15.6 %
EST. GFR  (AFRICAN AMERICAN): >60 ML/MIN/1.73 M^2
EST. GFR  (NON AFRICAN AMERICAN): >60 ML/MIN/1.73 M^2
FAMLB SPECIMEN: NORMAL
FLT3 RESULT: NORMAL
GLUCOSE SERPL-MCNC: 107 MG/DL
HCT VFR BLD AUTO: 20.1 %
HGB BLD-MCNC: 6.7 G/DL
IMM GRANULOCYTES # BLD AUTO: 0.01 K/UL
IMM GRANULOCYTES NFR BLD AUTO: 2.8 %
INR PPP: 1.1
LYMPHOCYTES # BLD AUTO: 0.1 K/UL
LYMPHOCYTES NFR BLD: 38.9 %
MAGNESIUM SERPL-MCNC: 1.5 MG/DL
MCH RBC QN AUTO: 31.6 PG
MCHC RBC AUTO-ENTMCNC: 33.3 G/DL
MCV RBC AUTO: 95 FL
MONOCYTES # BLD AUTO: 0.1 K/UL
MONOCYTES NFR BLD: 30.6 %
NEUTROPHILS # BLD AUTO: 0.1 K/UL
NEUTROPHILS NFR BLD: 27.7 %
NRBC BLD-RTO: 0 /100 WBC
NUM UNITS TRANS PACKED RBC: NORMAL
PATH REPORT.FINAL DX SPEC: NORMAL
PHOSPHATE SERPL-MCNC: 3.7 MG/DL
PLATELET # BLD AUTO: 12 K/UL
PLATELET BLD QL SMEAR: ABNORMAL
PMV BLD AUTO: 12.3 FL
POTASSIUM SERPL-SCNC: 4.2 MMOL/L
PROT SERPL-MCNC: 6.1 G/DL
PROTHROMBIN TIME: 11.1 SEC
RBC # BLD AUTO: 2.12 M/UL
REF LAB TEST METHOD: NORMAL
SODIUM SERPL-SCNC: 139 MMOL/L
SPECIMEN SOURCE: NORMAL
SPECIMEN TYPE: NORMAL
URATE SERPL-MCNC: 3 MG/DL
WBC # BLD AUTO: 0.36 K/UL

## 2018-05-31 PROCEDURE — 36415 COLL VENOUS BLD VENIPUNCTURE: CPT

## 2018-05-31 PROCEDURE — 85610 PROTHROMBIN TIME: CPT

## 2018-05-31 PROCEDURE — 80053 COMPREHEN METABOLIC PANEL: CPT

## 2018-05-31 PROCEDURE — 20600001 HC STEP DOWN PRIVATE ROOM

## 2018-05-31 PROCEDURE — 84100 ASSAY OF PHOSPHORUS: CPT

## 2018-05-31 PROCEDURE — 63600175 PHARM REV CODE 636 W HCPCS: Performed by: INTERNAL MEDICINE

## 2018-05-31 PROCEDURE — P9040 RBC LEUKOREDUCED IRRADIATED: HCPCS

## 2018-05-31 PROCEDURE — A4216 STERILE WATER/SALINE, 10 ML: HCPCS | Performed by: INTERNAL MEDICINE

## 2018-05-31 PROCEDURE — 85025 COMPLETE CBC W/AUTO DIFF WBC: CPT

## 2018-05-31 PROCEDURE — 25000003 PHARM REV CODE 250: Performed by: INTERNAL MEDICINE

## 2018-05-31 PROCEDURE — 83735 ASSAY OF MAGNESIUM: CPT

## 2018-05-31 PROCEDURE — 99233 SBSQ HOSP IP/OBS HIGH 50: CPT | Mod: ,,, | Performed by: INTERNAL MEDICINE

## 2018-05-31 PROCEDURE — 85730 THROMBOPLASTIN TIME PARTIAL: CPT

## 2018-05-31 PROCEDURE — 86644 CMV ANTIBODY: CPT

## 2018-05-31 PROCEDURE — 36430 TRANSFUSION BLD/BLD COMPNT: CPT

## 2018-05-31 PROCEDURE — 25000003 PHARM REV CODE 250: Performed by: STUDENT IN AN ORGANIZED HEALTH CARE EDUCATION/TRAINING PROGRAM

## 2018-05-31 PROCEDURE — 84550 ASSAY OF BLOOD/URIC ACID: CPT

## 2018-05-31 RX ORDER — HYDROCODONE BITARTRATE AND ACETAMINOPHEN 500; 5 MG/1; MG/1
TABLET ORAL
Status: DISCONTINUED | OUTPATIENT
Start: 2018-05-31 | End: 2018-06-01

## 2018-05-31 RX ADMIN — ALPRAZOLAM 0.25 MG: 0.25 TABLET ORAL at 02:05

## 2018-05-31 RX ADMIN — CYTARABINE 190 MG: 100 INJECTION, SOLUTION INTRATHECAL; INTRAVENOUS; SUBCUTANEOUS at 01:05

## 2018-05-31 RX ADMIN — CIPROFLOXACIN HYDROCHLORIDE 500 MG: 500 TABLET, FILM COATED ORAL at 08:05

## 2018-05-31 RX ADMIN — SODIUM CHLORIDE 23 MG: 9 INJECTION, SOLUTION INTRAVENOUS at 12:05

## 2018-05-31 RX ADMIN — Medication 10 ML: at 06:05

## 2018-05-31 RX ADMIN — Medication 10 ML: at 12:05

## 2018-05-31 RX ADMIN — ACYCLOVIR 400 MG: 200 CAPSULE ORAL at 08:05

## 2018-05-31 RX ADMIN — ALPRAZOLAM 0.25 MG: 0.25 TABLET ORAL at 08:05

## 2018-05-31 RX ADMIN — MAGNESIUM SULFATE HEPTAHYDRATE 1 G: 500 INJECTION, SOLUTION INTRAMUSCULAR; INTRAVENOUS at 09:05

## 2018-05-31 RX ADMIN — ACETAMINOPHEN 650 MG: 325 TABLET, FILM COATED ORAL at 02:05

## 2018-05-31 RX ADMIN — ACYCLOVIR 400 MG: 200 CAPSULE ORAL at 09:05

## 2018-05-31 RX ADMIN — DEXAMETHASONE 12 MG: 4 TABLET ORAL at 12:05

## 2018-05-31 RX ADMIN — METOPROLOL SUCCINATE 25 MG: 25 TABLET, EXTENDED RELEASE ORAL at 09:05

## 2018-05-31 RX ADMIN — ESCITALOPRAM 10 MG: 5 TABLET, FILM COATED ORAL at 09:05

## 2018-05-31 RX ADMIN — FAMOTIDINE 20 MG: 20 TABLET ORAL at 08:05

## 2018-05-31 RX ADMIN — ONDANSETRON 16 MG: 4 TABLET, FILM COATED ORAL at 12:05

## 2018-05-31 RX ADMIN — ALLOPURINOL 300 MG: 300 TABLET ORAL at 09:05

## 2018-05-31 RX ADMIN — FAMOTIDINE 20 MG: 20 TABLET ORAL at 09:05

## 2018-05-31 RX ADMIN — CIPROFLOXACIN HYDROCHLORIDE 500 MG: 500 TABLET, FILM COATED ORAL at 09:05

## 2018-05-31 NOTE — PROGRESS NOTES
[facility]  Hematology  Bone Marrow Transplant  Progress Note    Patient Name: Noreen Mac  Admission Date: 5/24/2018  Hospital Length of Stay: 7 days  Code Status: Full Code    Subjective:     Interval History:     - Day 3 of induction chemotherapy today.  - tolerating therapy well thus far.    - remains afebrile and blood pressure elevated.  - headaches on/off but tylenol helps.   - using nasal spray to help with nasal dryness.   - remains anxious and had palpitations yesterday afternoon, but PRN xanax helps.   - (+) 254 cc.       Objective:     Vital Signs (Most Recent):  Temp: 98 °F (36.7 °C) (05/31/18 0824)  Pulse: 60 (05/31/18 0824)  Resp: 16 (05/31/18 0824)  BP: (!) 156/70 (05/31/18 0824)  SpO2: 98 % (05/31/18 0824) Vital Signs (24h Range):  Temp:  [97 °F (36.1 °C)-98 °F (36.7 °C)] 98 °F (36.7 °C)  Pulse:  [59-71] 60  Resp:  [14-19] 16  SpO2:  [95 %-98 %] 98 %  BP: (125-156)/(56-78) 156/70     Weight: 75.4 kg (166 lb 3.6 oz)  Body mass index is 29.45 kg/m².  Body surface area is 1.83 meters squared.    ECOG SCORE         90% KPS    Intake/Output - Last 3 Shifts       05/29 0700 - 05/30 0659 05/30 0700 - 05/31 0659 05/31 0700 - 06/01 0659    P.O. 1000 360     I.V. (mL/kg) 733.3 (9.7) 779.3 (10.3)     IV Piggyback 408.6 2765     Total Intake(mL/kg) 2142 (28.3) 3904.4 (51.8)     Urine (mL/kg/hr) 2350 (1.3) 5050 (2.8)     Total Output 2350 5050      Net -208 -1145.7             Stool Occurrence 0 x 1 x           Physical Exam   Constitutional: She is oriented to person, place, and time. Vital signs are normal. She is cooperative. No distress.   HENT:   Head: Normocephalic.   Erythema noted on back of her throat.    Eyes: Conjunctivae and EOM are normal.   Neck: Trachea normal and normal range of motion. Neck supple.   Cardiovascular: Normal rate, regular rhythm, S1 normal, S2 normal and intact distal pulses.    Pulmonary/Chest: Effort normal and breath sounds normal.   Abdominal: Soft.  Normal appearance and bowel sounds are normal.   Musculoskeletal: Normal range of motion.   Neurological: She is alert and oriented to person, place, and time. Coordination and gait normal.   Skin: Skin is dry and intact. She is not diaphoretic. No pallor.   RUE PICC line with drsg C/D/I  Various small maculopapular lesions/rash to BLE (shins, R more than L). Per pt, this is improving   Psychiatric: She has a normal mood and affect. Her speech is normal.     Significant Labs:   CBC:     Recent Labs  Lab 05/30/18  0342 05/31/18  0345   WBC 0.49* 0.36*   HGB 7.1* 6.7*   HCT 21.2* 20.1*   PLT 11* 12*   , CMP:     Recent Labs  Lab 05/30/18 0342 05/31/18  0345    139   K 4.2 4.2    110   CO2 22* 22*   * 107   BUN 15 20   CREATININE 0.7 0.7   CALCIUM 8.1* 8.1*   PROT 6.3 6.1   ALBUMIN 2.7* 2.7*   BILITOT 0.8 1.1*   ALKPHOS 132 119   AST 11 12   ALT 8* 9*   ANIONGAP 7* 7*   EGFRNONAA >60.0 >60.0    and Uric Acid     Recent Labs  Lab 05/30/18 0342 05/31/18  0345   URICACID 2.9 3.0       Diagnostic Results:  I have reviewed all pertinent imaging results/findings within the past 24 hours.   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 Ordered routine labs in addition to FLT3, NGS, and AML FISH.  - lo res HLA typing on 5/26/18 and hi res on 5/27/18 done in anticipation of possible need for future transplant   - Pt with two daughters, two full sisters (in their mid 60s, one with brain aneurysm hx, other one without any medical issues), and one full brother (59 y/o healthy).  - ECHO ordered 5/25/18, EF 65%  - PICC line placed 5/25/18   - initially on hydrea, discontinued on 5/28.   - Pt is an induction candidate. Will not screen for research  trials. Will likely begin 7+3 chemo once pathology confirmed.  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 Awaiting molecular studies.   - started on induction therapy on 5/29/18.  - Day 3 of induction therapy, tolerating treatment fairly well.    - monitor for tumor lysis syndrome while receiving chemotherapy, on allopurinol 300 mg daily.   - prophylactic antimicrobials: Acyclovir, Cipro, and Voriconazole (start date 6/2)  - will need CNS Prophylaxis given her elevated WBC (high risk) on admission after count recovery.  - NPM1 +, CEBPA (-), awaiting additional molecular studies for risk stratification.                Pancytopenia    - secondary to AML and chemotherapy.   - wbc: 360/mm3 with ANC: 100/mm3, Hb: 6.7 g/dL, and Plt: 12,000/mm3.   - will transfuse 1 unit of PRBC today.   - tranfuse if Hb < 7 g/dL and Plt < 10,000/mm3 or active bleed.           MCTD (mixed connective tissue disease)    - MCTD-NOS  - previously on MTX and Prednisone.   - off therapy now and otherwise doing well.   - can follow up outpatient with her Rheumatologist upon discharge.         Alteration in electrolyte and fluid balance    - (+) 250 cc   - replace Mg today.         Bilateral headaches    - bilateral temporal headaches  - similar to ones on presentation at OSH  - CT Head done at OSH was unremarkable.   - will consider re-imaging if deemed necessary.   - PRN Tylenol for now.         Neutropenic fever    - issue resolved now.   - spiked temp on 5/29 of 102, afebrile since then.   - blood cultures no growth, CXR and UA unremarkable. Was on cefepime for 2 days.           Pulmonary nodule    - CXR suggestive of 7 mm slightly irregular shaped radiodensity projected over the left upper lung zone which could represent a calcified granuloma but remains indeterminate.  - CT Chest on 5/28: Multiple scattered noncalcified pulmonary granulomas, the largest measuring 8 mm in the lateral segment of the right middle lobe.   -  repeat non-contrast chest CT at 6-12 months         Insomnia    - PRN nightly Xanax.         Rash    - rash to BLE (shins)  - states it occurred mid April 2018 after 3rd dose of prolia  - possible that rash is from AML  - improving on own, will defer bx at this time and continue to monitor        HTN (hypertension)    - history of SVT and reports intermittent palpitations at times.  - EKG on 5/28 - NSR.  - increased metoprolol to 25 mg QD, this is her home dosage.   - continue to monitor        Electrolyte abnormality    - will replace electrolytes as needed.          GERD (gastroesophageal reflux disease)    - takes Omeprazole at home, now on pepcid here  - no issue         Osteoporosis    - takes prolia once every 6 months, last given mid April 2018   - no acute issue         Depression    - continue home Escitalopram   - no acute issue   - with associated anxiety--takes xanax at home but unaware of dose. Started on xanax 0.25 mg bid prn, can increase if needed            VTE Risk Mitigation         Ordered     heparin, porcine (PF) 100 unit/mL injection flush 300 Units  As needed (PRN)      05/29/18 1024     Place MAYLIN hose  Until discontinued      05/25/18 0032     IP VTE HIGH RISK PATIENT  Once      05/25/18 0032          Disposition: undergoing induction chemotherapy.     Mathew Rapp MD  Bone Marrow Transplant  [facility]

## 2018-05-31 NOTE — ASSESSMENT & PLAN NOTE
- Admitted from [facility] on 5/25/18 early AM with WBC around 100s, for 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 Ordered routine labs in addition to FLT3, NGS, and AML FISH.  - lo res HLA typing on 5/26/18 and hi res on 5/27/18 done in anticipation of possible need for future transplant   - Pt with two daughters, two full sisters (in their mid 60s, one with brain aneurysm hx, other one without any medical issues), and one full brother (61 y/o healthy).  - ECHO ordered 5/25/18, EF 65%  - PICC line placed 5/25/18   - initially on hydrea, discontinued on 5/28.   - Pt is an induction candidate. Will not screen for research trials. Will likely begin 7+3 chemo once pathology confirmed.  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 Awaiting molecular studies.   - started on induction therapy on 5/29/18.  - Day 3 of induction therapy, tolerating treatment fairly well.    - monitor for tumor lysis syndrome while receiving chemotherapy, on allopurinol 300 mg daily.   - prophylactic antimicrobials: Acyclovir, Cipro, and Voriconazole (start date 6/2)  - will need CNS Prophylaxis given her elevated WBC (high risk) on admission after count recovery.  - NPM1 +, CEBPA (-), awaiting additional molecular studies for risk stratification.

## 2018-05-31 NOTE — PROGRESS NOTES
Chemo note: Idarubicin and cytarabine verified with two chemo certified nurses. PICC with good blood return. Idarubicin infused with free flowing NS site, pushed over 15 minutes, pt tolerated without complaints. Cytarabine infusing at 43 cc/hr. All connections secured with chemo tape. Chemo precautions maintained. Pt instructed to call with any changes in assessment or c/o pain to site. Or any other discomfort.

## 2018-05-31 NOTE — PLAN OF CARE
Problem: Patient Care Overview  Goal: Plan of Care Review  Outcome: Ongoing (interventions implemented as appropriate)  Patient remains free from falls and injury this shift. Bed in low, locked position with call light in reach. Patient encouraged to call for assistance when needed.  Patient verbalized understanding. Pt had complaints of headache, given tylenol per Mar. Pt also received xanax per Mar related to anxiety and restlessness. Pt chemo running without any complications. Afebrile. Aseptic technique throughout shift. All belongings within reach will continue to monitor.'

## 2018-05-31 NOTE — ASSESSMENT & PLAN NOTE
- history of SVT and reports intermittent palpitations at times.  - EKG on 5/28 - NSR.  - increased metoprolol to 25 mg QD, this is her home dosage.   - continue to monitor

## 2018-05-31 NOTE — ASSESSMENT & PLAN NOTE
- secondary to AML and chemotherapy.   - wbc: 360/mm3 with ANC: 100/mm3, Hb: 6.7 g/dL, and Plt: 12,000/mm3.   - will transfuse 1 unit of PRBC today.   - tranfuse if Hb < 7 g/dL and Plt < 10,000/mm3 or active bleed.

## 2018-05-31 NOTE — PLAN OF CARE
Problem: Patient Care Overview  Goal: Plan of Care Review  Outcome: Ongoing (interventions implemented as appropriate)  Side rails up x2; call bell in place; bed in lowest, locked position; skid proof socks on; no evidence of skin breakdown; care plan explained to patient; pt remains free of injury. Pt tolerated po, voids, ambulates, pt with c/o anxiety xanax given. Pt stated she had relief. Chemo idarubicin ivp given without incident, and new bag of cytarabine infusing. Connection sites secured and picc with good blood return. Chemo precaution maintained. VSS and afebrile.

## 2018-05-31 NOTE — ASSESSMENT & PLAN NOTE
- issue resolved now.   - spiked temp on 5/29 of 102, afebrile since then.   - blood cultures no growth, CXR and UA unremarkable. Was on cefepime for 2 days.

## 2018-06-01 ENCOUNTER — TELEPHONE (OUTPATIENT)
Dept: PHARMACY | Facility: CLINIC | Age: 68
End: 2018-06-01

## 2018-06-01 DIAGNOSIS — C92.00 ACUTE MYELOID LEUKEMIA NOT HAVING ACHIEVED REMISSION: Primary | ICD-10-CM

## 2018-06-01 LAB
ALBUMIN SERPL BCP-MCNC: 2.8 G/DL
ALP SERPL-CCNC: 104 U/L
ALT SERPL W/O P-5'-P-CCNC: 10 U/L
ANION GAP SERPL CALC-SCNC: 7 MMOL/L
ANISOCYTOSIS BLD QL SMEAR: SLIGHT
AST SERPL-CCNC: 12 U/L
BASOPHILS # BLD AUTO: 0 K/UL
BASOPHILS NFR BLD: 0 %
BILIRUB SERPL-MCNC: 1.3 MG/DL
BUN SERPL-MCNC: 21 MG/DL
CALCIUM SERPL-MCNC: 7.8 MG/DL
CHLORIDE SERPL-SCNC: 110 MMOL/L
CO2 SERPL-SCNC: 21 MMOL/L
CREAT SERPL-MCNC: 0.7 MG/DL
DIFFERENTIAL METHOD: ABNORMAL
EOSINOPHIL # BLD AUTO: 0 K/UL
EOSINOPHIL NFR BLD: 0 %
ERYTHROCYTE [DISTWIDTH] IN BLOOD BY AUTOMATED COUNT: 15.2 %
EST. GFR  (AFRICAN AMERICAN): >60 ML/MIN/1.73 M^2
EST. GFR  (NON AFRICAN AMERICAN): >60 ML/MIN/1.73 M^2
GLUCOSE SERPL-MCNC: 97 MG/DL
HCT VFR BLD AUTO: 23.6 %
HGB BLD-MCNC: 7.8 G/DL
HYPOCHROMIA BLD QL SMEAR: ABNORMAL
IMM GRANULOCYTES # BLD AUTO: 0 K/UL
IMM GRANULOCYTES NFR BLD AUTO: 0 %
LYMPHOCYTES # BLD AUTO: 0.2 K/UL
LYMPHOCYTES NFR BLD: 53.1 %
MAGNESIUM SERPL-MCNC: 1.4 MG/DL
MCH RBC QN AUTO: 31.3 PG
MCHC RBC AUTO-ENTMCNC: 33.1 G/DL
MCV RBC AUTO: 95 FL
MONOCYTES # BLD AUTO: 0 K/UL
MONOCYTES NFR BLD: 9.4 %
NEUTROPHILS # BLD AUTO: 0.1 K/UL
NEUTROPHILS NFR BLD: 37.5 %
NRBC BLD-RTO: 0 /100 WBC
OVALOCYTES BLD QL SMEAR: ABNORMAL
PHOSPHATE SERPL-MCNC: 3.4 MG/DL
PLATELET # BLD AUTO: 13 K/UL
PLATELET BLD QL SMEAR: ABNORMAL
PMV BLD AUTO: 12.8 FL
POIKILOCYTOSIS BLD QL SMEAR: SLIGHT
POTASSIUM SERPL-SCNC: 4.2 MMOL/L
PROT SERPL-MCNC: 6.2 G/DL
RBC # BLD AUTO: 2.49 M/UL
SODIUM SERPL-SCNC: 138 MMOL/L
URATE SERPL-MCNC: 2.9 MG/DL
WBC # BLD AUTO: 0.32 K/UL

## 2018-06-01 PROCEDURE — 25000003 PHARM REV CODE 250: Performed by: STUDENT IN AN ORGANIZED HEALTH CARE EDUCATION/TRAINING PROGRAM

## 2018-06-01 PROCEDURE — 25000003 PHARM REV CODE 250: Performed by: INTERNAL MEDICINE

## 2018-06-01 PROCEDURE — 36415 COLL VENOUS BLD VENIPUNCTURE: CPT

## 2018-06-01 PROCEDURE — 25000003 PHARM REV CODE 250: Performed by: HOSPITALIST

## 2018-06-01 PROCEDURE — A4216 STERILE WATER/SALINE, 10 ML: HCPCS | Performed by: INTERNAL MEDICINE

## 2018-06-01 PROCEDURE — 63600175 PHARM REV CODE 636 W HCPCS: Performed by: INTERNAL MEDICINE

## 2018-06-01 PROCEDURE — 85025 COMPLETE CBC W/AUTO DIFF WBC: CPT

## 2018-06-01 PROCEDURE — 80053 COMPREHEN METABOLIC PANEL: CPT

## 2018-06-01 PROCEDURE — 99233 SBSQ HOSP IP/OBS HIGH 50: CPT | Mod: ,,, | Performed by: INTERNAL MEDICINE

## 2018-06-01 PROCEDURE — 84550 ASSAY OF BLOOD/URIC ACID: CPT

## 2018-06-01 PROCEDURE — 84100 ASSAY OF PHOSPHORUS: CPT

## 2018-06-01 PROCEDURE — 20600001 HC STEP DOWN PRIVATE ROOM

## 2018-06-01 PROCEDURE — 83735 ASSAY OF MAGNESIUM: CPT

## 2018-06-01 RX ORDER — MAGNESIUM SULFATE HEPTAHYDRATE 40 MG/ML
2 INJECTION, SOLUTION INTRAVENOUS ONCE
Status: COMPLETED | OUTPATIENT
Start: 2018-06-01 | End: 2018-06-01

## 2018-06-01 RX ORDER — CALCIUM CARBONATE 200(500)MG
500 TABLET,CHEWABLE ORAL DAILY PRN
Status: DISCONTINUED | OUTPATIENT
Start: 2018-06-01 | End: 2018-06-15

## 2018-06-01 RX ADMIN — ACYCLOVIR 400 MG: 200 CAPSULE ORAL at 08:06

## 2018-06-01 RX ADMIN — CIPROFLOXACIN HYDROCHLORIDE 500 MG: 500 TABLET, FILM COATED ORAL at 09:06

## 2018-06-01 RX ADMIN — CALCIUM CARBONATE (ANTACID) CHEW TAB 500 MG 500 MG: 500 CHEW TAB at 05:06

## 2018-06-01 RX ADMIN — Medication 10 ML: at 12:06

## 2018-06-01 RX ADMIN — ALPRAZOLAM 0.25 MG: 0.25 TABLET ORAL at 08:06

## 2018-06-01 RX ADMIN — FAMOTIDINE 20 MG: 20 TABLET ORAL at 08:06

## 2018-06-01 RX ADMIN — CIPROFLOXACIN HYDROCHLORIDE 500 MG: 500 TABLET, FILM COATED ORAL at 08:06

## 2018-06-01 RX ADMIN — METOPROLOL SUCCINATE 25 MG: 25 TABLET, EXTENDED RELEASE ORAL at 09:06

## 2018-06-01 RX ADMIN — ESCITALOPRAM 10 MG: 5 TABLET, FILM COATED ORAL at 09:06

## 2018-06-01 RX ADMIN — FAMOTIDINE 20 MG: 20 TABLET ORAL at 09:06

## 2018-06-01 RX ADMIN — ALLOPURINOL 300 MG: 300 TABLET ORAL at 09:06

## 2018-06-01 RX ADMIN — CYTARABINE 190 MG: 100 INJECTION, SOLUTION INTRATHECAL; INTRAVENOUS; SUBCUTANEOUS at 12:06

## 2018-06-01 RX ADMIN — ALPRAZOLAM 0.25 MG: 0.25 TABLET ORAL at 11:06

## 2018-06-01 RX ADMIN — ONDANSETRON 16 MG: 4 TABLET, FILM COATED ORAL at 12:06

## 2018-06-01 RX ADMIN — ACYCLOVIR 400 MG: 200 CAPSULE ORAL at 09:06

## 2018-06-01 RX ADMIN — PROCHLORPERAZINE MALEATE 10 MG: 5 TABLET, FILM COATED ORAL at 05:06

## 2018-06-01 RX ADMIN — MAGNESIUM SULFATE IN WATER 2 G: 40 INJECTION, SOLUTION INTRAVENOUS at 05:06

## 2018-06-01 RX ADMIN — Medication 10 ML: at 05:06

## 2018-06-01 NOTE — ASSESSMENT & PLAN NOTE
- net neutral + 191 over past 72 hours.   - Continues to drink plenty of water  - monitor daily electrolytes

## 2018-06-01 NOTE — ASSESSMENT & PLAN NOTE
- history of SVT and reports intermittent palpitations at times.  - EKG on 5/28 - NSR.  - increased metoprolol to 25 mg QD,she feels better with this dose.

## 2018-06-01 NOTE — ASSESSMENT & PLAN NOTE
- secondary to AML and chemotherapy.   - wbc: 320/mm3 with ANC: 100/mm3, Hb: 7.8 g/dL, and Plt: 13,000/mm3.   - responded appropriately to transfusion yesterday  - tranfuse if Hb < 7 g/dL and Plt < 10,000/mm3 or active bleed.

## 2018-06-01 NOTE — ASSESSMENT & PLAN NOTE
- complained of bilateral temporal headaches earlier in hospitalization course  - similar to ones on presentation at OSH  - CT Head done at OSH was unremarkable.   - will consider re-imaging if deemed necessary.   - no complaints of headache today  - PRN Tylenol for now.

## 2018-06-01 NOTE — ASSESSMENT & PLAN NOTE
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 Ordered routine labs in addition to FLT3, NGS, and AML FISH.  - lo res HLA typing on 5/26/18 and hi res on 5/27/18 done in anticipation of possible need for future transplant   - Pt with two daughters, two full sisters (in their mid 60s, one with brain aneurysm hx, other one without any medical issues), and one full brother (61 y/o healthy).  - ECHO ordered 5/25/18, EF 65%  - PICC line placed 5/25/18   - initially on hydrea, discontinued on 5/28.   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  - started on induction therapy on 5/29/18.  - Day 4 of induction therapy, tolerating treatment fairly well.    - On allopurinol 300 mg daily, risk for tumor lysis likely low at this point  - prophylactic antimicrobials: Acyclovir, Cipro. Voriconazole to start tomorrow  - will need CNS Prophylaxis given her elevated WBC (high risk) on admission after count recovery.  - NPM1 +, CEBPA (-), FLT3 (+).    - will plan to start Midostaurin on D8 (Tuesday, 6/5)  - continue to monitor blood counts and electrolytes daily  - will tenatively plan to do Day 14 bone marrow on Monday 6/11  - plan to sample CSF and give intrathecal chemo given high WBC and monocytic differentiation

## 2018-06-01 NOTE — PLAN OF CARE
Problem: Patient Care Overview  Goal: Plan of Care Review  Outcome: Ongoing (interventions implemented as appropriate)  Patient remains free from falls and injury this shift. Bed in low, locked position with call light in reach. Family at bedside. Patient encouraged to call for assistance when getting out of bed. Patient verbalized understanding. Pt had no complaints of pain or discomfort this shift. Afebrile. VS WNL. Chemo infusing without any complications   All belongings within reach will continue to monitor.'

## 2018-06-01 NOTE — ASSESSMENT & PLAN NOTE
- no acute issue   - continue home Escitalopram   - had issues of anxiety and palpitations on presentation that have resolved with low dose xanax and Toprol

## 2018-06-01 NOTE — TELEPHONE ENCOUNTER
[facility] received prescription for Rydapt 50mg twice daily on days 8 to 21 of cycle. Prior authorization is required. Day 8 will be 6/5/2018.

## 2018-06-01 NOTE — PROGRESS NOTES
[facility]  Hematology  Bone Marrow Transplant  Progress Note    Patient Name: Noreen Mac  Admission Date: 5/24/2018  Hospital Length of Stay: 8 days  Code Status: Full Code    Subjective:     Interval History:     - Day 4 of induction chemotherapy today.  - no acute issues reported overnight.    - HR controlled and no palpitations reported.   - Drinking plenty. Net neutral (+191) over last 72 hours.      Objective:     Vital Signs (Most Recent):  Temp: 97.9 °F (36.6 °C) (06/01/18 0338)  Pulse: (!) 58 (06/01/18 0338)  Resp: 20 (06/01/18 0338)  BP: (!) 149/68 (06/01/18 0338)  SpO2: 95 % (06/01/18 0338) Vital Signs (24h Range):  Temp:  [97.5 °F (36.4 °C)-98.4 °F (36.9 °C)] 97.9 °F (36.6 °C)  Pulse:  [54-62] 58  Resp:  [14-20] 20  SpO2:  [95 %-98 %] 95 %  BP: (125-156)/(56-95) 149/68     Weight: 75.4 kg (166 lb 3.6 oz)  Body mass index is 29.45 kg/m².  Body surface area is 1.83 meters squared.    ECOG SCORE         90% KPS    Intake/Output - Last 3 Shifts       05/30 0700 - 05/31 0659 05/31 0700 - 06/01 0659    P.O. 360 2100    I.V. (mL/kg) 779.3 (10.3) 2503.3 (33.2)    IV Piggyback 2765 708.9    Total Intake(mL/kg) 3904.4 (51.8) 5312.2 (70.5)    Urine (mL/kg/hr) 6050 (3.3) 3150 (1.7)    Total Output 6050 3150    Net -2145.7 +2162.2          Stool Occurrence 1 x 1 x          Physical Exam   Constitutional: She is oriented to person, place, and time. Vital signs are normal. She is cooperative. No distress.   HENT:   Head: Normocephalic.   Mouth/Throat: No oropharyngeal exudate.   Eyes: Conjunctivae and EOM are normal.   Neck: Trachea normal and normal range of motion. Neck supple.   Cardiovascular: Normal rate, regular rhythm, S1 normal, S2 normal and intact distal pulses.    Pulmonary/Chest: Effort normal and breath sounds normal.   Abdominal: Soft. Normal appearance and bowel sounds are normal.   Musculoskeletal: Normal range of motion.   Neurological: She is alert and oriented to person,  place, and time. Coordination and gait normal.   Skin: Skin is dry and intact. She is not diaphoretic. No pallor.   RUE PICC line with drsg C/D/I  LE rash's have resolved   Psychiatric: She has a normal mood and affect. Her speech is normal.     Significant Labs:   CBC:     Recent Labs  Lab 05/31/18  0345   WBC 0.36*   HGB 6.7*   HCT 20.1*   PLT 12*   , CMP:     Recent Labs  Lab 05/31/18  0345 06/01/18  0442    138   K 4.2 4.2    110   CO2 22* 21*    97   BUN 20 21   CREATININE 0.7 0.7   CALCIUM 8.1* 7.8*   PROT 6.1 6.2   ALBUMIN 2.7* 2.8*   BILITOT 1.1* 1.3*   ALKPHOS 119 104   AST 12 12   ALT 9* 10   ANIONGAP 7* 7*   EGFRNONAA >60.0 >60.0    and Uric Acid     Recent Labs  Lab 05/31/18  0345 06/01/18  0442   URICACID 3.0 2.9       Diagnostic Results:  I have reviewed all pertinent imaging results/findings within the past 24 hours.   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 Ordered routine labs in addition to FLT3, NGS, and AML FISH.  - lo res HLA typing on 5/26/18 and hi res on 5/27/18 done in anticipation of possible need for future transplant   - Pt with two daughters, two full sisters (in their mid 60s, one with brain aneurysm hx, other one without any medical issues), and one full brother (59 y/o healthy).  - ECHO ordered 5/25/18, EF 65%  - PICC line placed 5/25/18   - initially on hydrea, discontinued on 5/28.   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  - started on induction therapy on 5/29/18.  - Day 4 of induction therapy, tolerating treatment fairly well.    - On allopurinol 300 mg daily, risk for tumor lysis likely low at this point  - prophylactic antimicrobials: Acyclovir, Cipro. Voriconazole to start tomorrow  - will  need CNS Prophylaxis given her elevated WBC (high risk) on admission after count recovery.  - NPM1 +, CEBPA (-), FLT3 (+).    - will plan to start Midostaurin on D8 (Tuesday, 6/5)  - continue to monitor blood counts and electrolytes daily  - will tenatively plan to do Day 14 bone marrow on Monday 6/11  - plan to sample CSF and give intrathecal chemo given high WBC and monocytic differentiation            Pancytopenia    - secondary to AML and chemotherapy.   - wbc: 320/mm3 with ANC: 100/mm3, Hb: 7.8 g/dL, and Plt: 13,000/mm3.   - responded appropriately to transfusion yesterday  - tranfuse if Hb < 7 g/dL and Plt < 10,000/mm3 or active bleed.           MCTD (mixed connective tissue disease)    - MCTD-NOS  - previously on MTX and Prednisone.   - off therapy now and otherwise doing well.   - can follow up outpatient with her Rheumatologist upon discharge.         Alteration in electrolyte and fluid balance    - net neutral + 191 over past 72 hours.   - Continues to drink plenty of water  - monitor daily electrolytes        Bilateral headaches    - complained of bilateral temporal headaches earlier in hospitalization course  - similar to ones on presentation at OSH  - CT Head done at OSH was unremarkable.   - will consider re-imaging if deemed necessary.   - no complaints of headache today  - PRN Tylenol for now.           Neutropenic fever    - issue resolved now.   - spiked temp on 5/29 of 102, afebrile since then.   - blood cultures no growth, CXR and UA unremarkable. Was on cefepime for 2 days.           Pulmonary nodule    - CXR suggestive of 7 mm slightly irregular shaped radiodensity projected over the left upper lung zone which could represent a calcified granuloma but remains indeterminate.  - CT Chest on 5/28: Multiple scattered noncalcified pulmonary granulomas, the largest measuring 8 mm in the lateral segment of the right middle lobe.   - repeat non-contrast chest CT at 6-12 months         Insomnia    - PRN  nightly Xanax.         Rash    - rash to BLE (shins)  - states it occurred mid April 2018 after 3rd dose of prolia  - possible that rash is from AML  - improving on own, will defer bx at this time and continue to monitor        HTN (hypertension)    - history of SVT and reports intermittent palpitations at times.  - EKG on 5/28 - NSR.  - increased metoprolol to 25 mg QD,she feels better with this dose.        Electrolyte abnormality    - will replace electrolytes as needed.  - Mg Sulfate 2 gm IV for Mg of 1.4 this AM          GERD (gastroesophageal reflux disease)    - takes Omeprazole at home, now on pepcid here  - no issue         Osteoporosis    - takes prolia once every 6 months, last given mid April 2018   - no acute issue         Depression    - no acute issue   - continue home Escitalopram   - had issues of anxiety and palpitations on presentation that have resolved with low dose xanax and Toprol            VTE Risk Mitigation         Ordered     heparin, porcine (PF) 100 unit/mL injection flush 300 Units  As needed (PRN)      05/29/18 1024     Place MAYLIN hose  Until discontinued      05/25/18 0032     IP VTE HIGH RISK PATIENT  Once      05/25/18 0032          Disposition: On day 4 of (7 + 3) chemotherapy. Will need repeat bone marrow     Simon Luong MD  Bone Marrow Transplant  [facility]

## 2018-06-01 NOTE — SUBJECTIVE & OBJECTIVE
Subjective:     Interval History:     - Day 4 of induction chemotherapy today.  - no acute issues reported overnight.    - HR controlled and no palpitations reported.   - Drinking plenty. Net neutral (+191) over last 72 hours.      Objective:     Vital Signs (Most Recent):  Temp: 97.9 °F (36.6 °C) (06/01/18 0338)  Pulse: (!) 58 (06/01/18 0338)  Resp: 20 (06/01/18 0338)  BP: (!) 149/68 (06/01/18 0338)  SpO2: 95 % (06/01/18 0338) Vital Signs (24h Range):  Temp:  [97.5 °F (36.4 °C)-98.4 °F (36.9 °C)] 97.9 °F (36.6 °C)  Pulse:  [54-62] 58  Resp:  [14-20] 20  SpO2:  [95 %-98 %] 95 %  BP: (125-156)/(56-95) 149/68     Weight: 75.4 kg (166 lb 3.6 oz)  Body mass index is 29.45 kg/m².  Body surface area is 1.83 meters squared.    ECOG SCORE         90% KPS    Intake/Output - Last 3 Shifts       05/30 0700 - 05/31 0659 05/31 0700 - 06/01 0659    P.O. 360 2100    I.V. (mL/kg) 779.3 (10.3) 2503.3 (33.2)    IV Piggyback 2765 708.9    Total Intake(mL/kg) 3904.4 (51.8) 5312.2 (70.5)    Urine (mL/kg/hr) 6050 (3.3) 3150 (1.7)    Total Output 6050 3150    Net -2145.7 +2162.2          Stool Occurrence 1 x 1 x          Physical Exam   Constitutional: She is oriented to person, place, and time. Vital signs are normal. She is cooperative. No distress.   HENT:   Head: Normocephalic.   Mouth/Throat: No oropharyngeal exudate.   Eyes: Conjunctivae and EOM are normal.   Neck: Trachea normal and normal range of motion. Neck supple.   Cardiovascular: Normal rate, regular rhythm, S1 normal, S2 normal and intact distal pulses.    Pulmonary/Chest: Effort normal and breath sounds normal.   Abdominal: Soft. Normal appearance and bowel sounds are normal.   Musculoskeletal: Normal range of motion.   Neurological: She is alert and oriented to person, place, and time. Coordination and gait normal.   Skin: Skin is dry and intact. She is not diaphoretic. No pallor.   RUE PICC line with drsg C/D/I  LE rash's have resolved   Psychiatric: She has a normal  mood and affect. Her speech is normal.     Significant Labs:   CBC:     Recent Labs  Lab 05/31/18  0345   WBC 0.36*   HGB 6.7*   HCT 20.1*   PLT 12*   , CMP:     Recent Labs  Lab 05/31/18 0345 06/01/18  0442    138   K 4.2 4.2    110   CO2 22* 21*    97   BUN 20 21   CREATININE 0.7 0.7   CALCIUM 8.1* 7.8*   PROT 6.1 6.2   ALBUMIN 2.7* 2.8*   BILITOT 1.1* 1.3*   ALKPHOS 119 104   AST 12 12   ALT 9* 10   ANIONGAP 7* 7*   EGFRNONAA >60.0 >60.0    and Uric Acid     Recent Labs  Lab 05/31/18 0345 06/01/18  0442   URICACID 3.0 2.9       Diagnostic Results:  I have reviewed all pertinent imaging results/findings within the past 24 hours.   FLT 3 +

## 2018-06-02 LAB
ALBUMIN SERPL BCP-MCNC: 2.5 G/DL
ALP SERPL-CCNC: 93 U/L
ALT SERPL W/O P-5'-P-CCNC: 12 U/L
ANION GAP SERPL CALC-SCNC: 6 MMOL/L
ANISOCYTOSIS BLD QL SMEAR: SLIGHT
AST SERPL-CCNC: 14 U/L
BACTERIA BLD CULT: NORMAL
BACTERIA BLD CULT: NORMAL
BASOPHILS # BLD AUTO: 0 K/UL
BASOPHILS NFR BLD: 0 %
BILIRUB SERPL-MCNC: 0.8 MG/DL
BUN SERPL-MCNC: 18 MG/DL
CALCIUM SERPL-MCNC: 7.9 MG/DL
CHLORIDE SERPL-SCNC: 111 MMOL/L
CO2 SERPL-SCNC: 21 MMOL/L
CREAT SERPL-MCNC: 0.7 MG/DL
DIFFERENTIAL METHOD: ABNORMAL
EOSINOPHIL # BLD AUTO: 0 K/UL
EOSINOPHIL NFR BLD: 0 %
ERYTHROCYTE [DISTWIDTH] IN BLOOD BY AUTOMATED COUNT: 15.1 %
EST. GFR  (AFRICAN AMERICAN): >60 ML/MIN/1.73 M^2
EST. GFR  (NON AFRICAN AMERICAN): >60 ML/MIN/1.73 M^2
GLUCOSE SERPL-MCNC: 83 MG/DL
HCT VFR BLD AUTO: 22.3 %
HGB BLD-MCNC: 7.6 G/DL
HYPOCHROMIA BLD QL SMEAR: ABNORMAL
IMM GRANULOCYTES # BLD AUTO: 0 K/UL
IMM GRANULOCYTES NFR BLD AUTO: 0 %
LYMPHOCYTES # BLD AUTO: 0.7 K/UL
LYMPHOCYTES NFR BLD: 80 %
MAGNESIUM SERPL-MCNC: 1.3 MG/DL
MCH RBC QN AUTO: 32.8 PG
MCHC RBC AUTO-ENTMCNC: 34.1 G/DL
MCV RBC AUTO: 96 FL
MONOCYTES # BLD AUTO: 0 K/UL
MONOCYTES NFR BLD: 0 %
NEUTROPHILS # BLD AUTO: 0.2 K/UL
NEUTROPHILS NFR BLD: 20 %
NRBC BLD-RTO: 0 /100 WBC
OVALOCYTES BLD QL SMEAR: ABNORMAL
PHOSPHATE SERPL-MCNC: 3 MG/DL
PLATELET # BLD AUTO: 12 K/UL
PMV BLD AUTO: 10.6 FL
POIKILOCYTOSIS BLD QL SMEAR: SLIGHT
POLYCHROMASIA BLD QL SMEAR: ABNORMAL
POTASSIUM SERPL-SCNC: 3.6 MMOL/L
PROT SERPL-MCNC: 5.3 G/DL
RBC # BLD AUTO: 2.32 M/UL
SODIUM SERPL-SCNC: 138 MMOL/L
URATE SERPL-MCNC: 2.9 MG/DL
WBC # BLD AUTO: 0.85 K/UL

## 2018-06-02 PROCEDURE — 25000003 PHARM REV CODE 250: Performed by: STUDENT IN AN ORGANIZED HEALTH CARE EDUCATION/TRAINING PROGRAM

## 2018-06-02 PROCEDURE — 84550 ASSAY OF BLOOD/URIC ACID: CPT

## 2018-06-02 PROCEDURE — 25000003 PHARM REV CODE 250: Performed by: INTERNAL MEDICINE

## 2018-06-02 PROCEDURE — 85025 COMPLETE CBC W/AUTO DIFF WBC: CPT

## 2018-06-02 PROCEDURE — 83735 ASSAY OF MAGNESIUM: CPT

## 2018-06-02 PROCEDURE — 80053 COMPREHEN METABOLIC PANEL: CPT

## 2018-06-02 PROCEDURE — 63600175 PHARM REV CODE 636 W HCPCS: Performed by: HOSPITALIST

## 2018-06-02 PROCEDURE — 25000003 PHARM REV CODE 250: Performed by: HOSPITALIST

## 2018-06-02 PROCEDURE — 99233 SBSQ HOSP IP/OBS HIGH 50: CPT | Mod: ,,, | Performed by: INTERNAL MEDICINE

## 2018-06-02 PROCEDURE — 84100 ASSAY OF PHOSPHORUS: CPT

## 2018-06-02 PROCEDURE — 63600175 PHARM REV CODE 636 W HCPCS: Performed by: INTERNAL MEDICINE

## 2018-06-02 PROCEDURE — 20600001 HC STEP DOWN PRIVATE ROOM

## 2018-06-02 RX ORDER — MAGNESIUM SULFATE HEPTAHYDRATE 40 MG/ML
2 INJECTION, SOLUTION INTRAVENOUS
Status: COMPLETED | OUTPATIENT
Start: 2018-06-02 | End: 2018-06-02

## 2018-06-02 RX ADMIN — ALPRAZOLAM 0.25 MG: 0.25 TABLET ORAL at 08:06

## 2018-06-02 RX ADMIN — MAGNESIUM SULFATE IN WATER 2 G: 40 INJECTION, SOLUTION INTRAVENOUS at 08:06

## 2018-06-02 RX ADMIN — METOPROLOL SUCCINATE 25 MG: 25 TABLET, EXTENDED RELEASE ORAL at 08:06

## 2018-06-02 RX ADMIN — ALPRAZOLAM 0.25 MG: 0.25 TABLET ORAL at 06:06

## 2018-06-02 RX ADMIN — CYTARABINE 190 MG: 100 INJECTION, SOLUTION INTRATHECAL; INTRAVENOUS; SUBCUTANEOUS at 12:06

## 2018-06-02 RX ADMIN — POTASSIUM BICARBONATE 50 MEQ: 25 TABLET, EFFERVESCENT ORAL at 06:06

## 2018-06-02 RX ADMIN — ONDANSETRON 16 MG: 4 TABLET, FILM COATED ORAL at 11:06

## 2018-06-02 RX ADMIN — ACYCLOVIR 400 MG: 200 CAPSULE ORAL at 08:06

## 2018-06-02 RX ADMIN — CIPROFLOXACIN HYDROCHLORIDE 500 MG: 500 TABLET, FILM COATED ORAL at 09:06

## 2018-06-02 RX ADMIN — FAMOTIDINE 20 MG: 20 TABLET ORAL at 09:06

## 2018-06-02 RX ADMIN — FAMOTIDINE 20 MG: 20 TABLET ORAL at 08:06

## 2018-06-02 RX ADMIN — VORICONAZOLE 200 MG: 200 TABLET, FILM COATED ORAL at 09:06

## 2018-06-02 RX ADMIN — VORICONAZOLE 200 MG: 200 TABLET, FILM COATED ORAL at 08:06

## 2018-06-02 RX ADMIN — ESCITALOPRAM 10 MG: 5 TABLET, FILM COATED ORAL at 08:06

## 2018-06-02 RX ADMIN — MAGNESIUM SULFATE IN WATER 2 G: 40 INJECTION, SOLUTION INTRAVENOUS at 11:06

## 2018-06-02 RX ADMIN — ACYCLOVIR 400 MG: 200 CAPSULE ORAL at 09:06

## 2018-06-02 RX ADMIN — ALLOPURINOL 300 MG: 300 TABLET ORAL at 08:06

## 2018-06-02 RX ADMIN — CIPROFLOXACIN HYDROCHLORIDE 500 MG: 500 TABLET, FILM COATED ORAL at 08:06

## 2018-06-02 NOTE — ASSESSMENT & PLAN NOTE
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 Ordered routine labs in addition to FLT3, NGS, and AML FISH.  - lo res HLA typing on 5/26/18 and hi res on 5/27/18 done in anticipation of possible need for future transplant   - Pt with two daughters, two full sisters (in their mid 60s, one with brain aneurysm hx, other one without any medical issues), and one full brother (59 y/o healthy).  - ECHO ordered 5/25/18, EF 65%  - PICC line placed 5/25/18   - initially on hydrea, discontinued on 5/28.   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  - started on induction therapy on 5/29/18.  - Day 5 of induction therapy, tolerating treatment fairly well.    - On allopurinol 300 mg daily, risk for tumor lysis likely low at this point  - prophylactic antimicrobials: Acyclovir, Cipro. Voriconazole to start tomorrow  - will need CNS Prophylaxis given her elevated WBC (high risk) on admission after count recovery.  - NPM1 +, CEBPA (-), FLT3 (+).    - will plan to start Midostaurin on D8 (Tuesday, 6/5)  - continue to monitor blood counts and electrolytes daily  - will tenatively plan to do Day 14 bone marrow on Monday 6/11  - plan to sample CSF and give intrathecal chemo given high WBC and monocytic differentiation

## 2018-06-02 NOTE — PROGRESS NOTES
[facility]  Hematology  Bone Marrow Transplant  Progress Note    Patient Name: Noreen Mac  Admission Date: 5/24/2018  Hospital Length of Stay: 9 days  Code Status: Full Code    Subjective:     Interval History:     - Day 5 of induction chemotherapy today.  - tolerating therapy well thus far. Had some mild fatigue and nausea for first time yesterday, but says ok now    Current ROS  Gen: No fevers, chills  Eyes: No vision changes, discharge  ENT: No rhinorrhea, congestion  CV: No CP, palpitations  Pulm: No SOB, cough  GI: No abd pain, nausea, vomiting  Neuro: No HA, paresthesias      Objective:     Vital Signs (Most Recent):  Temp: 98.3 °F (36.8 °C) (06/02/18 0555)  Pulse: 68 (06/02/18 0555)  Resp: 19 (06/02/18 0555)  BP: (!) 114/57 (06/02/18 0555)  SpO2: 98 % (06/02/18 0555) Vital Signs (24h Range):  Temp:  [97.8 °F (36.6 °C)-98.3 °F (36.8 °C)] 98.3 °F (36.8 °C)  Pulse:  [54-68] 68  Resp:  [17-20] 19  SpO2:  [97 %-98 %] 98 %  BP: (114-140)/(57-65) 114/57     Weight: 73.8 kg (162 lb 9.4 oz)  Body mass index is 28.8 kg/m².  Body surface area is 1.81 meters squared.    ECOG SCORE         90% KPS    Intake/Output - Last 3 Shifts       05/31 0700 - 06/01 0659 06/01 0700 - 06/02 0659 06/02 0700 - 06/03 0659    P.O. 2100 500     I.V. (mL/kg) 2503.3 (33.2)      IV Piggyback 708.9 1497.7     Total Intake(mL/kg) 5312.2 (70.5) 1997.7 (27.1)     Urine (mL/kg/hr) 3150 (1.7) 3150 (1.8)     Total Output 3150 3150      Net +2162.2 -1152.3             Stool Occurrence 1 x            Physical Exam   Constitutional: Vital signs are normal. She is cooperative. No distress.   HENT:   Head: Normocephalic.   Erythema noted on back of her throat.    Eyes: Conjunctivae and EOM are normal.   Neck: Trachea normal and normal range of motion. Neck supple.   Cardiovascular: Normal rate, regular rhythm, S1 normal, S2 normal and intact distal pulses.    Pulmonary/Chest: Effort normal and breath sounds normal.   Abdominal:  Soft. Normal appearance and bowel sounds are normal.   Musculoskeletal: Normal range of motion.   Neurological: She is alert. Coordination and gait normal.   Skin: Skin is dry and intact. She is not diaphoretic. No pallor.   RUE PICC line with drsg C/D/I  Various small maculopapular lesions/rash to BLE (shins, R more than L). Per pt, this is improving   Psychiatric: She has a normal mood and affect. Her speech is normal.     Significant Labs:   CBC:     Recent Labs  Lab 06/01/18  0442 06/02/18  0400   WBC 0.32* 0.85*   HGB 7.8* 7.6*   HCT 23.6* 22.3*   PLT 13* 12*   , CMP:     Recent Labs  Lab 06/01/18  0442 06/02/18  0400    138   K 4.2 3.6    111*   CO2 21* 21*   GLU 97 83   BUN 21 18   CREATININE 0.7 0.7   CALCIUM 7.8* 7.9*   PROT 6.2 5.3*   ALBUMIN 2.8* 2.5*   BILITOT 1.3* 0.8   ALKPHOS 104 93   AST 12 14   ALT 10 12   ANIONGAP 7* 6*   EGFRNONAA >60.0 >60.0    and Uric Acid     Recent Labs  Lab 06/01/18 0442 06/02/18  0400   URICACID 2.9 2.9       Diagnostic Results:  I have reviewed all pertinent imaging results/findings within the past 24 hours.   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 Ordered routine labs in addition to FLT3, NGS, and AML FISH.  - lo res HLA typing on 5/26/18 and hi res on 5/27/18 done in anticipation of possible need for future transplant   - Pt with two daughters, two full sisters (in their mid 60s, one with brain aneurysm hx, other one without any medical issues), and one full brother (61 y/o healthy).  - ECHO ordered 5/25/18, EF 65%  - PICC line placed 5/25/18   - initially on hydrea, discontinued on 5/28.   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  - started on induction therapy on  5/29/18.  - Day 5 of induction therapy, tolerating treatment fairly well.    - On allopurinol 300 mg daily, risk for tumor lysis likely low at this point  - prophylactic antimicrobials: Acyclovir, Cipro. Voriconazole to start tomorrow  - will need CNS Prophylaxis given her elevated WBC (high risk) on admission after count recovery.  - NPM1 +, CEBPA (-), FLT3 (+).    - will plan to start Midostaurin on D8 (Tuesday, 6/5)  - continue to monitor blood counts and electrolytes daily  - will tenatively plan to do Day 14 bone marrow on Monday 6/11  - plan to sample CSF and give intrathecal chemo given high WBC and monocytic differentiation            Pancytopenia    - secondary to AML and chemotherapy.   - wbc: 320/mm3 with ANC: 100/mm3, Hb: 7.8 g/dL, and Plt: 13,000/mm3.   - responded appropriately to transfusion yesterday  - tranfuse if Hb < 7 g/dL and Plt < 10,000/mm3 or active bleed.           MCTD (mixed connective tissue disease)    - MCTD-NOS  - previously on MTX and Prednisone.   - off therapy now and otherwise doing well.   - can follow up outpatient with her Rheumatologist upon discharge.         Alteration in electrolyte and fluid balance    - Continues to drink plenty of water  - monitor daily electrolytes        Bilateral headaches    - complained of bilateral temporal headaches earlier in hospitalization course  - similar to ones on presentation at OSH  - CT Head done at OSH was unremarkable.   - will consider re-imaging if deemed necessary.   - no complaints of headache today  - PRN Tylenol for now.           Neutropenic fever    - issue resolved now.   - spiked temp on 5/29 of 102, afebrile since then.   - blood cultures no growth, CXR and UA unremarkable. Was on cefepime for 2 days.           Pulmonary nodule    - CXR suggestive of 7 mm slightly irregular shaped radiodensity projected over the left upper lung zone which could represent a calcified granuloma but remains indeterminate.  - CT Chest on 5/28:  Multiple scattered noncalcified pulmonary granulomas, the largest measuring 8 mm in the lateral segment of the right middle lobe.   - repeat non-contrast chest CT at 6-12 months         Insomnia    - PRN nightly Xanax.         Rash    - rash to BLE (shins)  - states it occurred mid April 2018 after 3rd dose of prolia  - possible that rash is from AML  - improving on own, will defer bx at this time and continue to monitor        HTN (hypertension)    - history of SVT and reports intermittent palpitations at times.  - EKG on 5/28 - NSR.  - increased metoprolol to 25 mg QD,she feels better with this dose.        Electrolyte abnormality    - will replace electrolytes as needed.  - Mg Sulfate 2 gm IV for Mg of 1.4 this AM          GERD (gastroesophageal reflux disease)    - takes Omeprazole at home, now on pepcid here  - no issue         Osteoporosis    - takes prolia once every 6 months, last given mid April 2018   - no acute issue         Depression    - no acute issue   - continue home Escitalopram   - had issues of anxiety and palpitations on presentation that have resolved with low dose xanax and Toprol            VTE Risk Mitigation         Ordered     heparin, porcine (PF) 100 unit/mL injection flush 300 Units  As needed (PRN)      05/29/18 1024     Place MAYLIN hose  Until discontinued      05/25/18 0032     IP VTE HIGH RISK PATIENT  Once      05/25/18 0032          Disposition: pending    Kirby Van IV, MD  Bone Marrow Transplant  [facility]

## 2018-06-02 NOTE — PLAN OF CARE
Problem: Patient Care Overview  Goal: Plan of Care Review  Outcome: Ongoing (interventions implemented as appropriate)  POC reviewed with patient; understanding verbalized. Cytarabine infusing at 41.7.  Pt. with nonskid footwear on, bed in lowest position, and locked with bed rails up x2. Pt. has call light and personal items within reach. Patient ambulates in room independently. VSS and afebrile this shift. All questions and concerns address at this time. Will continue to monitor.

## 2018-06-02 NOTE — PLAN OF CARE
Problem: Patient Care Overview  Goal: Plan of Care Review  Outcome: Ongoing (interventions implemented as appropriate)  Pt AAOx4; independent. Vital signs stable and pt remained afebrile throughout shift. Day 4 of 7+3; cytarabine infusing as ordered.  Pt c/o slight nausea and indigestion this afternoon. 2 g Mg given for Mg level. Pt remained free from falls during shift.  Bed lowest position and locked.  Call light in reach.  WCTM

## 2018-06-02 NOTE — SUBJECTIVE & OBJECTIVE
Subjective:     Interval History:     - Day 5 of induction chemotherapy today.  - tolerating therapy well thus far. Had some mild fatigue and nausea for first time yesterday, but says ok now    Current ROS  Gen: No fevers, chills  Eyes: No vision changes, discharge  ENT: No rhinorrhea, congestion  CV: No CP, palpitations  Pulm: No SOB, cough  GI: No abd pain, nausea, vomiting  Neuro: No HA, paresthesias      Objective:     Vital Signs (Most Recent):  Temp: 98.3 °F (36.8 °C) (06/02/18 0555)  Pulse: 68 (06/02/18 0555)  Resp: 19 (06/02/18 0555)  BP: (!) 114/57 (06/02/18 0555)  SpO2: 98 % (06/02/18 0555) Vital Signs (24h Range):  Temp:  [97.8 °F (36.6 °C)-98.3 °F (36.8 °C)] 98.3 °F (36.8 °C)  Pulse:  [54-68] 68  Resp:  [17-20] 19  SpO2:  [97 %-98 %] 98 %  BP: (114-140)/(57-65) 114/57     Weight: 73.8 kg (162 lb 9.4 oz)  Body mass index is 28.8 kg/m².  Body surface area is 1.81 meters squared.    ECOG SCORE         90% KPS    Intake/Output - Last 3 Shifts       05/31 0700 - 06/01 0659 06/01 0700 - 06/02 0659 06/02 0700 - 06/03 0659    P.O. 2100 500     I.V. (mL/kg) 2503.3 (33.2)      IV Piggyback 708.9 1497.7     Total Intake(mL/kg) 5312.2 (70.5) 1997.7 (27.1)     Urine (mL/kg/hr) 3150 (1.7) 3150 (1.8)     Total Output 3150 3150      Net +2162.2 -1152.3             Stool Occurrence 1 x            Physical Exam   Constitutional: Vital signs are normal. She is cooperative. No distress.   HENT:   Head: Normocephalic.   Erythema noted on back of her throat.    Eyes: Conjunctivae and EOM are normal.   Neck: Trachea normal and normal range of motion. Neck supple.   Cardiovascular: Normal rate, regular rhythm, S1 normal, S2 normal and intact distal pulses.    Pulmonary/Chest: Effort normal and breath sounds normal.   Abdominal: Soft. Normal appearance and bowel sounds are normal.   Musculoskeletal: Normal range of motion.   Neurological: She is alert. Coordination and gait normal.   Skin: Skin is dry and intact. She is not  diaphoretic. No pallor.   RUE PICC line with drsg C/D/I  Various small maculopapular lesions/rash to BLE (shins, R more than L). Per pt, this is improving   Psychiatric: She has a normal mood and affect. Her speech is normal.     Significant Labs:   CBC:     Recent Labs  Lab 06/01/18  0442 06/02/18  0400   WBC 0.32* 0.85*   HGB 7.8* 7.6*   HCT 23.6* 22.3*   PLT 13* 12*   , CMP:     Recent Labs  Lab 06/01/18  0442 06/02/18  0400    138   K 4.2 3.6    111*   CO2 21* 21*   GLU 97 83   BUN 21 18   CREATININE 0.7 0.7   CALCIUM 7.8* 7.9*   PROT 6.2 5.3*   ALBUMIN 2.8* 2.5*   BILITOT 1.3* 0.8   ALKPHOS 104 93   AST 12 14   ALT 10 12   ANIONGAP 7* 6*   EGFRNONAA >60.0 >60.0    and Uric Acid     Recent Labs  Lab 06/01/18  0442 06/02/18  0400   URICACID 2.9 2.9       Diagnostic Results:  I have reviewed all pertinent imaging results/findings within the past 24 hours.   ECHO 5/25/18, ordered, EF 65%

## 2018-06-02 NOTE — PLAN OF CARE
Problem: Patient Care Overview  Goal: Plan of Care Review  Outcome: Ongoing (interventions implemented as appropriate)  Pt day 5 of 7+3. Experiencing mild fatigue, no c/o pain or nausea. Xanax given for c/o anxiety.  Received PRN electrolyte replacements. Cytarabine infusing at 41.7 ml/hr. Pt. with nonskid footwear on with bed in lowest position and locked with bed rails up x2.  Pt. ambulates independently and instructed to call if assistance is needed.  Call light within reach.   at the bedside. Will continue to monitor pt.

## 2018-06-02 NOTE — PROGRESS NOTES
Chemo Note: Chemotherapy consent in chart. Chemo verified with Ruma CALLEJAS RN. Zofran 16 mg ODT given prior to administration.  Right arm PICC patent and positive for blood return.  Cytarabine (PF) (CYTOSAR) 100 mg/m2/day = 190 mg in sodium chloride 0.9% 1,000 mL started @ 43 mL/h to infuse over 24 hours. Chemo precautions in place. Will continue to monitor pt.

## 2018-06-03 LAB
ALBUMIN SERPL BCP-MCNC: 2.7 G/DL
ALP SERPL-CCNC: 94 U/L
ALT SERPL W/O P-5'-P-CCNC: 11 U/L
ANION GAP SERPL CALC-SCNC: 7 MMOL/L
ANISOCYTOSIS BLD QL SMEAR: SLIGHT
AST SERPL-CCNC: 11 U/L
BASOPHILS # BLD AUTO: 0 K/UL
BASOPHILS NFR BLD: 0 %
BILIRUB SERPL-MCNC: 0.9 MG/DL
BLD PROD TYP BPU: NORMAL
BLOOD UNIT EXPIRATION DATE: NORMAL
BLOOD UNIT TYPE CODE: 6200
BLOOD UNIT TYPE: NORMAL
BUN SERPL-MCNC: 14 MG/DL
CALCIUM SERPL-MCNC: 8.4 MG/DL
CHLORIDE SERPL-SCNC: 108 MMOL/L
CO2 SERPL-SCNC: 24 MMOL/L
CODING SYSTEM: NORMAL
CREAT SERPL-MCNC: 0.7 MG/DL
DIFFERENTIAL METHOD: ABNORMAL
DISPENSE STATUS: NORMAL
EOSINOPHIL # BLD AUTO: 0 K/UL
EOSINOPHIL NFR BLD: 0 %
ERYTHROCYTE [DISTWIDTH] IN BLOOD BY AUTOMATED COUNT: 14.6 %
EST. GFR  (AFRICAN AMERICAN): >60 ML/MIN/1.73 M^2
EST. GFR  (NON AFRICAN AMERICAN): >60 ML/MIN/1.73 M^2
GLUCOSE SERPL-MCNC: 95 MG/DL
HCT VFR BLD AUTO: 23.3 %
HGB BLD-MCNC: 7.9 G/DL
HYPOCHROMIA BLD QL SMEAR: ABNORMAL
IMM GRANULOCYTES # BLD AUTO: 0.01 K/UL
IMM GRANULOCYTES NFR BLD AUTO: 1.7 %
LYMPHOCYTES # BLD AUTO: 0.4 K/UL
LYMPHOCYTES NFR BLD: 69 %
MAGNESIUM SERPL-MCNC: 1.7 MG/DL
MCH RBC QN AUTO: 32.4 PG
MCHC RBC AUTO-ENTMCNC: 33.9 G/DL
MCV RBC AUTO: 96 FL
MONOCYTES # BLD AUTO: 0 K/UL
MONOCYTES NFR BLD: 1.7 %
NEUTROPHILS # BLD AUTO: 0.2 K/UL
NEUTROPHILS NFR BLD: 27.6 %
NRBC BLD-RTO: 0 /100 WBC
NUM UNITS TRANS WBC-POOR PLATPHERESIS: NORMAL
PHOSPHATE SERPL-MCNC: 3.9 MG/DL
PLATELET # BLD AUTO: 10 K/UL
PLATELET BLD QL SMEAR: ABNORMAL
PMV BLD AUTO: 12.6 FL
POTASSIUM SERPL-SCNC: 4.4 MMOL/L
PROT SERPL-MCNC: 5.7 G/DL
RBC # BLD AUTO: 2.44 M/UL
SODIUM SERPL-SCNC: 139 MMOL/L
URATE SERPL-MCNC: 2.2 MG/DL
WBC # BLD AUTO: 0.58 K/UL

## 2018-06-03 PROCEDURE — 25000003 PHARM REV CODE 250: Performed by: INTERNAL MEDICINE

## 2018-06-03 PROCEDURE — 63600175 PHARM REV CODE 636 W HCPCS: Performed by: INTERNAL MEDICINE

## 2018-06-03 PROCEDURE — 80053 COMPREHEN METABOLIC PANEL: CPT

## 2018-06-03 PROCEDURE — 86644 CMV ANTIBODY: CPT

## 2018-06-03 PROCEDURE — 83735 ASSAY OF MAGNESIUM: CPT

## 2018-06-03 PROCEDURE — 84100 ASSAY OF PHOSPHORUS: CPT

## 2018-06-03 PROCEDURE — 85025 COMPLETE CBC W/AUTO DIFF WBC: CPT

## 2018-06-03 PROCEDURE — P9037 PLATE PHERES LEUKOREDU IRRAD: HCPCS

## 2018-06-03 PROCEDURE — 20600001 HC STEP DOWN PRIVATE ROOM

## 2018-06-03 PROCEDURE — 99233 SBSQ HOSP IP/OBS HIGH 50: CPT | Mod: ,,, | Performed by: INTERNAL MEDICINE

## 2018-06-03 PROCEDURE — 63600175 PHARM REV CODE 636 W HCPCS: Performed by: HOSPITALIST

## 2018-06-03 PROCEDURE — 25000003 PHARM REV CODE 250: Performed by: STUDENT IN AN ORGANIZED HEALTH CARE EDUCATION/TRAINING PROGRAM

## 2018-06-03 PROCEDURE — 84550 ASSAY OF BLOOD/URIC ACID: CPT

## 2018-06-03 RX ORDER — MAGNESIUM SULFATE HEPTAHYDRATE 40 MG/ML
2 INJECTION, SOLUTION INTRAVENOUS ONCE
Status: COMPLETED | OUTPATIENT
Start: 2018-06-03 | End: 2018-06-03

## 2018-06-03 RX ADMIN — ACETAMINOPHEN 650 MG: 325 TABLET, FILM COATED ORAL at 08:06

## 2018-06-03 RX ADMIN — FAMOTIDINE 20 MG: 20 TABLET ORAL at 08:06

## 2018-06-03 RX ADMIN — ALPRAZOLAM 0.25 MG: 0.25 TABLET ORAL at 08:06

## 2018-06-03 RX ADMIN — ONDANSETRON 16 MG: 4 TABLET, FILM COATED ORAL at 11:06

## 2018-06-03 RX ADMIN — ACETAMINOPHEN 650 MG: 325 TABLET, FILM COATED ORAL at 10:06

## 2018-06-03 RX ADMIN — VORICONAZOLE 200 MG: 200 TABLET, FILM COATED ORAL at 08:06

## 2018-06-03 RX ADMIN — ALLOPURINOL 300 MG: 300 TABLET ORAL at 10:06

## 2018-06-03 RX ADMIN — ACYCLOVIR 400 MG: 200 CAPSULE ORAL at 08:06

## 2018-06-03 RX ADMIN — VORICONAZOLE 200 MG: 200 TABLET, FILM COATED ORAL at 10:06

## 2018-06-03 RX ADMIN — CIPROFLOXACIN HYDROCHLORIDE 500 MG: 500 TABLET, FILM COATED ORAL at 10:06

## 2018-06-03 RX ADMIN — CIPROFLOXACIN HYDROCHLORIDE 500 MG: 500 TABLET, FILM COATED ORAL at 08:06

## 2018-06-03 RX ADMIN — CYTARABINE 190 MG: 100 INJECTION, SOLUTION INTRATHECAL; INTRAVENOUS; SUBCUTANEOUS at 12:06

## 2018-06-03 RX ADMIN — ESCITALOPRAM 10 MG: 5 TABLET, FILM COATED ORAL at 10:06

## 2018-06-03 RX ADMIN — DIPHENHYDRAMINE HYDROCHLORIDE 12.5 MG: 50 INJECTION, SOLUTION INTRAMUSCULAR; INTRAVENOUS at 10:06

## 2018-06-03 RX ADMIN — ALPRAZOLAM 0.25 MG: 0.25 TABLET ORAL at 10:06

## 2018-06-03 RX ADMIN — PROCHLORPERAZINE MALEATE 10 MG: 5 TABLET, FILM COATED ORAL at 08:06

## 2018-06-03 RX ADMIN — ACETAMINOPHEN 650 MG: 325 TABLET, FILM COATED ORAL at 12:06

## 2018-06-03 RX ADMIN — MAGNESIUM SULFATE IN WATER 2 G: 40 INJECTION, SOLUTION INTRAVENOUS at 06:06

## 2018-06-03 RX ADMIN — ACYCLOVIR 400 MG: 200 CAPSULE ORAL at 10:06

## 2018-06-03 RX ADMIN — FAMOTIDINE 20 MG: 20 TABLET ORAL at 10:06

## 2018-06-03 NOTE — PROGRESS NOTES
Chemotherapy consent in chart. Drug, dose, and two patient identifiers verified with second RN. Blood return present to right arm PICC, prior to initiation of Cytarabine 190 mg to run for 24 hours. All connections secured with gauze and tape. Patient instructed to call with any issues; call light within reach.

## 2018-06-03 NOTE — PLAN OF CARE
Problem: Patient Care Overview  Goal: Plan of Care Review  Outcome: Ongoing (interventions implemented as appropriate)  Patient AAOx4, VSS, afebrile, and without injury. Fall precautions maintained.  at bedside. Patient instructed on how to contact the nurse. Patient on room air without distress; on regular diet with good appetite. Patient day 6 of 7+3. Cytarabine administered by Ruma Butts RN. Patient with complaints of nausea and anxiety this morning treated with PRN medications with relief. No complaints of pain. Patient received one unit of platelets; tolerated well. Questions and concerns have been addressed; will continue to monitor.

## 2018-06-03 NOTE — PLAN OF CARE
Problem: Patient Care Overview  Goal: Plan of Care Review  Outcome: Ongoing (interventions implemented as appropriate)  AAOx4, bed in low and locked position, call bell within reach, voids via hat, no falls. Day 6 of 7 and 3. Cytarabine running at 43 ml/hr. Complained of headache; tylenol administered. Patient stable, vitals stable, will continue to monitor.

## 2018-06-03 NOTE — PROGRESS NOTES
[facility]  Hematology  Bone Marrow Transplant  Progress Note    Patient Name: Noreen Mac  Admission Date: 5/24/2018  Hospital Length of Stay: 10 days  Code Status: Full Code    Subjective:     Interval History:     - Day 6 of induction chemotherapy today.  - tolerating therapy well thus far. Continues with mild fatigue and nausea. Reports has been reading her books but got a little headache after reading for a long time    Current ROS  Gen: No fevers, chills, +fatigue  Eyes: No vision changes, discharge  ENT: No rhinorrhea, congestion  CV: No CP, palpitations  Pulm: No SOB, cough  GI: No abd pain, vomiting, +nausea  Neuro: No HA, paresthesias      Objective:     Vital Signs (Most Recent):  Temp: 97.5 °F (36.4 °C) (06/03/18 0716)  Pulse: (!) 51 (06/03/18 0716)  Resp: 16 (06/03/18 0716)  BP: (!) 123/56 (06/03/18 0716)  SpO2: 98 % (06/03/18 0716) Vital Signs (24h Range):  Temp:  [97.5 °F (36.4 °C)-98.6 °F (37 °C)] 97.5 °F (36.4 °C)  Pulse:  [51-88] 51  Resp:  [14-22] 16  SpO2:  [96 %-99 %] 98 %  BP: ()/(46-60) 123/56     Weight: 73.6 kg (162 lb 5.9 oz)  Body mass index is 28.76 kg/m².  Body surface area is 1.81 meters squared.    ECOG SCORE         90% KPS    Intake/Output - Last 3 Shifts       06/01 0700 - 06/02 0659 06/02 0700 - 06/03 0659 06/03 0700 - 06/04 0659    P.O. 500 680     I.V. (mL/kg)  100 (1.4)     IV Piggyback 1497.7      Total Intake(mL/kg) 1997.7 (27.1) 780 (10.6)     Urine (mL/kg/hr) 3150 (1.8) 3050 (1.7)     Total Output 3150 3050      Net -1152.3 -2270                   Physical Exam   Constitutional: Vital signs are normal. She is cooperative. No distress.   HENT:   Head: Normocephalic.   Erythema noted on back of her throat.    Eyes: Conjunctivae and EOM are normal.   Neck: Trachea normal and normal range of motion. Neck supple.   Cardiovascular: Normal rate, regular rhythm, S1 normal, S2 normal and intact distal pulses.    Pulmonary/Chest: Effort normal and breath  sounds normal.   Abdominal: Soft. Normal appearance and bowel sounds are normal.   Musculoskeletal: Normal range of motion.   Neurological: She is alert. Coordination and gait normal.   Skin: Skin is dry and intact. She is not diaphoretic. No pallor.   RUE PICC line with drsg C/D/I  Various small maculopapular lesions/rash to BLE (shins, R more than L). Per pt, this is improving   Psychiatric: She has a normal mood and affect. Her speech is normal.     Significant Labs:   CBC:     Recent Labs  Lab 06/02/18  0400 06/03/18  0350   WBC 0.85* 0.58*   HGB 7.6* 7.9*   HCT 22.3* 23.3*   PLT 12* 10*   , CMP:     Recent Labs  Lab 06/02/18  0400 06/03/18  0350    139   K 3.6 4.4   * 108   CO2 21* 24   GLU 83 95   BUN 18 14   CREATININE 0.7 0.7   CALCIUM 7.9* 8.4*   PROT 5.3* 5.7*   ALBUMIN 2.5* 2.7*   BILITOT 0.8 0.9   ALKPHOS 93 94   AST 14 11   ALT 12 11   ANIONGAP 6* 7*   EGFRNONAA >60.0 >60.0    and Uric Acid     Recent Labs  Lab 06/02/18  0400 06/03/18  0350   URICACID 2.9 2.2*       Diagnostic Results:  I have reviewed all pertinent imaging results/findings within the past 24 hours.   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 Ordered routine labs in addition to FLT3, NGS, and AML FISH.  - lo res HLA typing on 5/26/18 and hi res on 5/27/18 done in anticipation of possible need for future transplant   - Pt with two daughters, two full sisters (in their mid 60s, one with brain aneurysm hx, other one without any medical issues), and one full brother (59 y/o healthy).  - ECHO ordered 5/25/18, EF 65%  - PICC line placed 5/25/18   - initially on hydrea, discontinued on 5/28.   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  - started on  induction therapy on 5/29/18.  - Day 6 of induction therapy, tolerating treatment fairly well.    - On allopurinol 300 mg daily, risk for tumor lysis likely low at this point  - prophylactic antimicrobials: Acyclovir, Cipro. Voriconazole to start tomorrow  - will need CNS Prophylaxis given her elevated WBC (high risk) on admission after count recovery.  - NPM1 +, CEBPA (-), FLT3 (+).    - will plan to start Midostaurin on D8 (Tuesday, 6/5)  - continue to monitor blood counts and electrolytes daily  - will tenatively plan to do Day 14 bone marrow on Monday 6/11  - plan to sample CSF and give intrathecal chemo given high WBC and monocytic differentiation            Pancytopenia    - secondary to AML and chemotherapy.   - wbc: 320/mm3 with ANC: 100/mm3, Hb: 7.8 g/dL, and Plt: 13,000/mm3.   - responded appropriately to transfusion yesterday  - tranfuse if Hb < 7 g/dL and Plt < 10,000/mm3 or active bleed.           MCTD (mixed connective tissue disease)    - MCTD-NOS  - previously on MTX and Prednisone.   - off therapy now and otherwise doing well.   - can follow up outpatient with her Rheumatologist upon discharge.         Alteration in electrolyte and fluid balance    - Continues to drink plenty of water  - monitor daily electrolytes        Bilateral headaches    - complained of bilateral temporal headaches earlier in hospitalization course  - similar to ones on presentation at OSH  - CT Head done at OSH was unremarkable.   - will consider re-imaging if deemed necessary.   - no complaints of headache today  - PRN Tylenol for now.           Neutropenic fever    - issue resolved now.   - spiked temp on 5/29 of 102, afebrile since then.   - blood cultures no growth, CXR and UA unremarkable. Was on cefepime for 2 days.           Pulmonary nodule    - CXR suggestive of 7 mm slightly irregular shaped radiodensity projected over the left upper lung zone which could represent a calcified granuloma but remains indeterminate.  -  CT Chest on 5/28: Multiple scattered noncalcified pulmonary granulomas, the largest measuring 8 mm in the lateral segment of the right middle lobe.   - repeat non-contrast chest CT at 6-12 months         Insomnia    - PRN nightly Xanax.         Rash    - rash to BLE (shins)  - states it occurred mid April 2018 after 3rd dose of prolia  - possible that rash is from AML  - improving on own, will defer bx at this time and continue to monitor        HTN (hypertension)    - history of SVT and reports intermittent palpitations at times.  - EKG on 5/28 - NSR.  - increased metoprolol to 25 mg QD,she feels better with this dose.        Electrolyte abnormality    - will replace electrolytes as needed.  - Mg Sulfate 2 gm IV for Mg of 1.4 this AM          GERD (gastroesophageal reflux disease)    - takes Omeprazole at home, now on pepcid here  - no issue         Osteoporosis    - takes prolia once every 6 months, last given mid April 2018   - no acute issue         Depression    - no acute issue   - continue home Escitalopram   - had issues of anxiety and palpitations on presentation that have resolved with low dose xanax and Toprol            VTE Risk Mitigation         Ordered     heparin, porcine (PF) 100 unit/mL injection flush 300 Units  As needed (PRN)      05/29/18 1024     Place MAYLIN hose  Until discontinued      05/25/18 0032     IP VTE HIGH RISK PATIENT  Once      05/25/18 0032          Disposition: pending    Kirby Van IV, MD  Bone Marrow Transplant  [facility]

## 2018-06-03 NOTE — ASSESSMENT & PLAN NOTE
- Admitted from [facility] on 5/25/18 early AM with WBC around 100s, for 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 Ordered routine labs in addition to FLT3, NGS, and AML FISH.  - lo res HLA typing on 5/26/18 and hi res on 5/27/18 done in anticipation of possible need for future transplant   - Pt with two daughters, two full sisters (in their mid 60s, one with brain aneurysm hx, other one without any medical issues), and one full brother (59 y/o healthy).  - ECHO ordered 5/25/18, EF 65%  - PICC line placed 5/25/18   - initially on hydrea, discontinued on 5/28.   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  - started on induction therapy on 5/29/18.  - Day 6 of induction therapy, tolerating treatment fairly well.    - On allopurinol 300 mg daily, risk for tumor lysis likely low at this point  - prophylactic antimicrobials: Acyclovir, Cipro. Voriconazole to start tomorrow  - will need CNS Prophylaxis given her elevated WBC (high risk) on admission after count recovery.  - NPM1 +, CEBPA (-), FLT3 (+).    - will plan to start Midostaurin on D8 (Tuesday, 6/5)  - continue to monitor blood counts and electrolytes daily  - will tenatively plan to do Day 14 bone marrow on Monday 6/11  - plan to sample CSF and give intrathecal chemo given high WBC and monocytic differentiation

## 2018-06-03 NOTE — SUBJECTIVE & OBJECTIVE
Subjective:     Interval History:     - Day 6 of induction chemotherapy today.  - tolerating therapy well thus far. Continues with mild fatigue and nausea. Reports has been reading her books but got a little headache after reading for a long time    Current ROS  Gen: No fevers, chills, +fatigue  Eyes: No vision changes, discharge  ENT: No rhinorrhea, congestion  CV: No CP, palpitations  Pulm: No SOB, cough  GI: No abd pain, vomiting, +nausea  Neuro: No HA, paresthesias      Objective:     Vital Signs (Most Recent):  Temp: 97.5 °F (36.4 °C) (06/03/18 0716)  Pulse: (!) 51 (06/03/18 0716)  Resp: 16 (06/03/18 0716)  BP: (!) 123/56 (06/03/18 0716)  SpO2: 98 % (06/03/18 0716) Vital Signs (24h Range):  Temp:  [97.5 °F (36.4 °C)-98.6 °F (37 °C)] 97.5 °F (36.4 °C)  Pulse:  [51-88] 51  Resp:  [14-22] 16  SpO2:  [96 %-99 %] 98 %  BP: ()/(46-60) 123/56     Weight: 73.6 kg (162 lb 5.9 oz)  Body mass index is 28.76 kg/m².  Body surface area is 1.81 meters squared.    ECOG SCORE         90% KPS    Intake/Output - Last 3 Shifts       06/01 0700 - 06/02 0659 06/02 0700 - 06/03 0659 06/03 0700 - 06/04 0659    P.O. 500 680     I.V. (mL/kg)  100 (1.4)     IV Piggyback 1497.7      Total Intake(mL/kg) 1997.7 (27.1) 780 (10.6)     Urine (mL/kg/hr) 3150 (1.8) 3050 (1.7)     Total Output 3150 3050      Net -1152.3 -2270                   Physical Exam   Constitutional: Vital signs are normal. She is cooperative. No distress.   HENT:   Head: Normocephalic.   Erythema noted on back of her throat.    Eyes: Conjunctivae and EOM are normal.   Neck: Trachea normal and normal range of motion. Neck supple.   Cardiovascular: Normal rate, regular rhythm, S1 normal, S2 normal and intact distal pulses.    Pulmonary/Chest: Effort normal and breath sounds normal.   Abdominal: Soft. Normal appearance and bowel sounds are normal.   Musculoskeletal: Normal range of motion.   Neurological: She is alert. Coordination and gait normal.   Skin: Skin is  dry and intact. She is not diaphoretic. No pallor.   RUE PICC line with drsg C/D/I  Various small maculopapular lesions/rash to BLE (shins, R more than L). Per pt, this is improving   Psychiatric: She has a normal mood and affect. Her speech is normal.     Significant Labs:   CBC:     Recent Labs  Lab 06/02/18  0400 06/03/18  0350   WBC 0.85* 0.58*   HGB 7.6* 7.9*   HCT 22.3* 23.3*   PLT 12* 10*   , CMP:     Recent Labs  Lab 06/02/18  0400 06/03/18  0350    139   K 3.6 4.4   * 108   CO2 21* 24   GLU 83 95   BUN 18 14   CREATININE 0.7 0.7   CALCIUM 7.9* 8.4*   PROT 5.3* 5.7*   ALBUMIN 2.5* 2.7*   BILITOT 0.8 0.9   ALKPHOS 93 94   AST 14 11   ALT 12 11   ANIONGAP 6* 7*   EGFRNONAA >60.0 >60.0    and Uric Acid     Recent Labs  Lab 06/02/18  0400 06/03/18  0350   URICACID 2.9 2.2*       Diagnostic Results:  I have reviewed all pertinent imaging results/findings within the past 24 hours.   ECHO 5/25/18, ordered, EF 65%

## 2018-06-04 ENCOUNTER — TELEPHONE (OUTPATIENT)
Dept: PHARMACY | Facility: CLINIC | Age: 68
End: 2018-06-04

## 2018-06-04 LAB
ALBUMIN SERPL BCP-MCNC: 2.8 G/DL
ALP SERPL-CCNC: 92 U/L
ALT SERPL W/O P-5'-P-CCNC: 8 U/L
ANION GAP SERPL CALC-SCNC: 8 MMOL/L
ANISOCYTOSIS BLD QL SMEAR: SLIGHT
AST SERPL-CCNC: 8 U/L
BASOPHILS # BLD AUTO: 0 K/UL
BASOPHILS NFR BLD: 0 %
BILIRUB SERPL-MCNC: 0.4 MG/DL
BUN SERPL-MCNC: 10 MG/DL
CALCIUM SERPL-MCNC: 7.6 MG/DL
CHLORIDE SERPL-SCNC: 107 MMOL/L
CO2 SERPL-SCNC: 23 MMOL/L
CREAT SERPL-MCNC: 0.7 MG/DL
DIFFERENTIAL METHOD: ABNORMAL
EOSINOPHIL # BLD AUTO: 0 K/UL
EOSINOPHIL NFR BLD: 0 %
ERYTHROCYTE [DISTWIDTH] IN BLOOD BY AUTOMATED COUNT: 14.4 %
EST. GFR  (AFRICAN AMERICAN): >60 ML/MIN/1.73 M^2
EST. GFR  (NON AFRICAN AMERICAN): >60 ML/MIN/1.73 M^2
GLUCOSE SERPL-MCNC: 104 MG/DL
HCT VFR BLD AUTO: 21.8 %
HGB BLD-MCNC: 7.2 G/DL
IMM GRANULOCYTES # BLD AUTO: 0.01 K/UL
IMM GRANULOCYTES NFR BLD AUTO: 2.4 %
LYMPHOCYTES # BLD AUTO: 0.4 K/UL
LYMPHOCYTES NFR BLD: 83.3 %
MAGNESIUM SERPL-MCNC: 1.6 MG/DL
MCH RBC QN AUTO: 31.7 PG
MCHC RBC AUTO-ENTMCNC: 33 G/DL
MCV RBC AUTO: 96 FL
MONOCYTES # BLD AUTO: 0 K/UL
MONOCYTES NFR BLD: 0 %
NEUTROPHILS # BLD AUTO: 0.1 K/UL
NEUTROPHILS NFR BLD: 14.3 %
NRBC BLD-RTO: 0 /100 WBC
OVALOCYTES BLD QL SMEAR: ABNORMAL
PHOSPHATE SERPL-MCNC: 2.9 MG/DL
PLATELET # BLD AUTO: 28 K/UL
PLATELET BLD QL SMEAR: ABNORMAL
PMV BLD AUTO: 11 FL
POIKILOCYTOSIS BLD QL SMEAR: SLIGHT
POLYCHROMASIA BLD QL SMEAR: ABNORMAL
POTASSIUM SERPL-SCNC: 4 MMOL/L
PROT SERPL-MCNC: 5.7 G/DL
RBC # BLD AUTO: 2.27 M/UL
SODIUM SERPL-SCNC: 138 MMOL/L
TARGETS BLD QL SMEAR: ABNORMAL
URATE SERPL-MCNC: 2.1 MG/DL
WBC # BLD AUTO: 0.42 K/UL

## 2018-06-04 PROCEDURE — 99233 SBSQ HOSP IP/OBS HIGH 50: CPT | Mod: ,,, | Performed by: INTERNAL MEDICINE

## 2018-06-04 PROCEDURE — 83735 ASSAY OF MAGNESIUM: CPT

## 2018-06-04 PROCEDURE — 84100 ASSAY OF PHOSPHORUS: CPT

## 2018-06-04 PROCEDURE — 25000003 PHARM REV CODE 250: Performed by: INTERNAL MEDICINE

## 2018-06-04 PROCEDURE — 20600001 HC STEP DOWN PRIVATE ROOM

## 2018-06-04 PROCEDURE — 84550 ASSAY OF BLOOD/URIC ACID: CPT

## 2018-06-04 PROCEDURE — 80053 COMPREHEN METABOLIC PANEL: CPT

## 2018-06-04 PROCEDURE — 85025 COMPLETE CBC W/AUTO DIFF WBC: CPT

## 2018-06-04 PROCEDURE — 63600175 PHARM REV CODE 636 W HCPCS: Performed by: INTERNAL MEDICINE

## 2018-06-04 PROCEDURE — 25000003 PHARM REV CODE 250: Performed by: STUDENT IN AN ORGANIZED HEALTH CARE EDUCATION/TRAINING PROGRAM

## 2018-06-04 RX ORDER — ONDANSETRON 2 MG/ML
4 INJECTION INTRAMUSCULAR; INTRAVENOUS EVERY 6 HOURS PRN
Status: DISCONTINUED | OUTPATIENT
Start: 2018-06-05 | End: 2018-06-09

## 2018-06-04 RX ORDER — RAMELTEON 8 MG/1
8 TABLET ORAL NIGHTLY PRN
Status: DISCONTINUED | OUTPATIENT
Start: 2018-06-04 | End: 2018-07-13 | Stop reason: HOSPADM

## 2018-06-04 RX ADMIN — ESCITALOPRAM 10 MG: 5 TABLET, FILM COATED ORAL at 09:06

## 2018-06-04 RX ADMIN — CIPROFLOXACIN HYDROCHLORIDE 500 MG: 500 TABLET, FILM COATED ORAL at 08:06

## 2018-06-04 RX ADMIN — ONDANSETRON 16 MG: 4 TABLET, FILM COATED ORAL at 12:06

## 2018-06-04 RX ADMIN — ACYCLOVIR 400 MG: 200 CAPSULE ORAL at 09:06

## 2018-06-04 RX ADMIN — RAMELTEON 8 MG: 8 TABLET, FILM COATED ORAL at 12:06

## 2018-06-04 RX ADMIN — FAMOTIDINE 20 MG: 20 TABLET ORAL at 09:06

## 2018-06-04 RX ADMIN — RAMELTEON 8 MG: 8 TABLET, FILM COATED ORAL at 08:06

## 2018-06-04 RX ADMIN — CIPROFLOXACIN HYDROCHLORIDE 500 MG: 500 TABLET, FILM COATED ORAL at 09:06

## 2018-06-04 RX ADMIN — CYTARABINE 190 MG: 100 INJECTION, SOLUTION INTRATHECAL; INTRAVENOUS; SUBCUTANEOUS at 01:06

## 2018-06-04 RX ADMIN — VORICONAZOLE 200 MG: 200 TABLET, FILM COATED ORAL at 09:06

## 2018-06-04 RX ADMIN — ALPRAZOLAM 0.25 MG: 0.25 TABLET ORAL at 09:06

## 2018-06-04 RX ADMIN — ACYCLOVIR 400 MG: 200 CAPSULE ORAL at 08:06

## 2018-06-04 RX ADMIN — FAMOTIDINE 20 MG: 20 TABLET ORAL at 08:06

## 2018-06-04 RX ADMIN — ALPRAZOLAM 0.25 MG: 0.25 TABLET ORAL at 05:06

## 2018-06-04 RX ADMIN — ALLOPURINOL 300 MG: 300 TABLET ORAL at 09:06

## 2018-06-04 RX ADMIN — VORICONAZOLE 200 MG: 200 TABLET, FILM COATED ORAL at 08:06

## 2018-06-04 NOTE — ASSESSMENT & PLAN NOTE
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 Ordered routine labs in addition to FLT3, NGS, and AML FISH.  - lo res HLA typing on 5/26/18 and hi res on 5/27/18 done in anticipation of possible need for future transplant   - Pt with two daughters, two full sisters (in their mid 60s, one with brain aneurysm hx, other one without any medical issues), and one full brother (59 y/o healthy).  - ECHO ordered 5/25/18, EF 65%  - PICC line placed 5/25/18   - initially on hydrea, discontinued on 5/28.   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  - started on induction therapy on 5/29/18.  - Day 6 of induction therapy, tolerating treatment fairly well.    - On allopurinol 300 mg daily, risk for tumor lysis likely low at this point. Will taper tumor lysis labs to twice weekly (M-Th)  - prophylactic antimicrobials: Acyclovir, Cipro. Voriconazole to start tomorrow  - will need CNS Prophylaxis given her elevated WBC (high risk) on admission after count recovery.  - NPM1 +, CEBPA (-), FLT3 (+).    - will plan to start Midostaurin on D8 (Tuesday, 6/5)  - continue to monitor blood counts and electrolytes daily  - will tenatively plan to do Day 14 bone marrow on Monday 6/11  - plan to sample CSF and give intrathecal chemo given high WBC and monocytic differentiation

## 2018-06-04 NOTE — PROGRESS NOTES
[facility]  Hematology  Bone Marrow Transplant  Progress Note    Patient Name: Noreen Mac  Admission Date: 5/24/2018  Hospital Length of Stay: 11 days  Code Status: Full Code    Subjective:     Interval History:   - Day 7 of (7+3) chemotherapy  - tolerating treatment well so far    Objective:     Vital Signs (Most Recent):  Temp: 97.8 °F (36.6 °C) (06/04/18 1156)  Pulse: 85 (06/04/18 1156)  Resp: 19 (06/04/18 1156)  BP: (!) 103/53 (06/04/18 1156)  SpO2: 98 % (06/04/18 1156) Vital Signs (24h Range):  Temp:  [97.8 °F (36.6 °C)-99.6 °F (37.6 °C)] 97.8 °F (36.6 °C)  Pulse:  [63-86] 85  Resp:  [14-19] 19  SpO2:  [95 %-99 %] 98 %  BP: (100-133)/(53-62) 103/53     Weight: 73.6 kg (162 lb 5.9 oz)  Body mass index is 28.76 kg/m².  Body surface area is 1.81 meters squared.    ECOG SCORE         [unfilled]    Intake/Output - Last 3 Shifts       06/02 0700 - 06/03 0659 06/03 0700 - 06/04 0659 06/04 0700 - 06/05 0659    P.O. 680 600     I.V. (mL/kg) 100 (1.4)      Total Intake(mL/kg) 780 (10.6) 600 (8.2)     Urine (mL/kg/hr) 3050 (1.7) 3400 (1.9)     Total Output 3050 3400      Net -2270 -2800                   Physical Exam   Constitutional: She is oriented to person, place, and time. She appears well-developed and well-nourished. No distress.   HENT:   Head: Normocephalic and atraumatic.   Mouth/Throat: Oropharynx is clear and moist. No oropharyngeal exudate.   Eyes: EOM are normal. Pupils are equal, round, and reactive to light.   Neck: Normal range of motion.   Cardiovascular: Normal rate, regular rhythm and normal heart sounds.    No murmur heard.  Pulmonary/Chest: Effort normal and breath sounds normal. No respiratory distress.   Abdominal: Soft. Bowel sounds are normal. She exhibits no distension. There is no tenderness.   Musculoskeletal: Normal range of motion. She exhibits no edema.   Neurological: She is alert and oriented to person, place, and time.   Skin: Skin is warm and dry. Capillary  refill takes less than 2 seconds. No rash noted.       Significant Labs:   CBC:   Recent Labs  Lab 06/03/18  0350 06/04/18  0304   WBC 0.58* 0.42*   HGB 7.9* 7.2*   HCT 23.3* 21.8*   PLT 10* 28*    and CMP:   Recent Labs  Lab 06/03/18  0350 06/04/18  0304    138   K 4.4 4.0    107   CO2 24 23   GLU 95 104   BUN 14 10   CREATININE 0.7 0.7   CALCIUM 8.4* 7.6*   PROT 5.7* 5.7*   ALBUMIN 2.7* 2.8*   BILITOT 0.9 0.4   ALKPHOS 94 92   AST 11 8*   ALT 11 8*   ANIONGAP 7* 8   EGFRNONAA >60.0 >60.0       Diagnostic Results:  I have reviewed all pertinent imaging results/findings within the past 24 hours.    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 Ordered routine labs in addition to FLT3, NGS, and AML FISH.  - lo res HLA typing on 5/26/18 and hi res on 5/27/18 done in anticipation of possible need for future transplant   - Pt with two daughters, two full sisters (in their mid 60s, one with brain aneurysm hx, other one without any medical issues), and one full brother (61 y/o healthy).  - ECHO ordered 5/25/18, EF 65%  - PICC line placed 5/25/18   - initially on hydrea, discontinued on 5/28.   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  - started on induction therapy on 5/29/18.  - Day 6 of induction therapy, tolerating treatment fairly well.    - On allopurinol 300 mg daily, risk for tumor lysis likely low at this point. Will taper tumor lysis labs to twice weekly (M-Th)  - prophylactic antimicrobials: Acyclovir, Cipro. Voriconazole to start tomorrow  - will need CNS Prophylaxis given her elevated WBC (high risk) on admission after count recovery.  - NPM1 +, CEBPA (-), FLT3 (+).    - will plan to start Midostaurin on D8 (Tuesday, 6/5)  - continue to monitor blood  counts and electrolytes daily  - will tenatively plan to do Day 14 bone marrow on Monday 6/11  - plan to sample CSF and give intrathecal chemo given high WBC and monocytic differentiation            Pancytopenia    - secondary to AML and chemotherapy.   - wbc: 320/mm3 with ANC: 100/mm3, Hb: 7.8 g/dL, and Plt: 13,000/mm3.   - responded appropriately to transfusion yesterday  - tranfuse if Hb < 7 g/dL and Plt < 10,000/mm3 or active bleed.           MCTD (mixed connective tissue disease)    - MCTD-NOS  - previously on MTX and Prednisone.   - off therapy now and otherwise doing well.   - can follow up outpatient with her Rheumatologist upon discharge.         Alteration in electrolyte and fluid balance    - Continues to drink plenty of water  - monitor daily electrolytes        Bilateral headaches    - complained of bilateral temporal headaches earlier in hospitalization course  - similar to ones on presentation at OSH  - CT Head done at OSH was unremarkable.   - will consider re-imaging if deemed necessary.   - no complaints of headache today  - PRN Tylenol for now.           Neutropenic fever    - issue resolved now.   - spiked temp on 5/29 of 102, afebrile since then.   - blood cultures no growth, CXR and UA unremarkable. Was on cefepime for 2 days.           Pulmonary nodule    - CXR suggestive of 7 mm slightly irregular shaped radiodensity projected over the left upper lung zone which could represent a calcified granuloma but remains indeterminate.  - CT Chest on 5/28: Multiple scattered noncalcified pulmonary granulomas, the largest measuring 8 mm in the lateral segment of the right middle lobe.   - repeat non-contrast chest CT at 6-12 months         Insomnia    - PRN nightly Xanax.         Rash    - rash to BLE (shins)  - states it occurred mid April 2018 after 3rd dose of prolia  - possible that rash is from AML  - improving on own, will defer bx at this time and continue to monitor        HTN (hypertension)     - history of SVT and reports intermittent palpitations at times.  - EKG on 5/28 - NSR.  - increased metoprolol to 25 mg QD,she feels better with this dose.        Electrolyte abnormality    - will replace electrolytes as needed.  - Mg Sulfate 2 gm IV for Mg of 1.4 this AM          GERD (gastroesophageal reflux disease)    - takes Omeprazole at home, now on pepcid here  - no issue         Osteoporosis    - takes prolia once every 6 months, last given mid April 2018   - no acute issue         Depression    - no acute issue   - continue home Escitalopram             VTE Risk Mitigation         Ordered     heparin, porcine (PF) 100 unit/mL injection flush 300 Units  As needed (PRN)      05/29/18 1024     Place MAYLIN hose  Until discontinued      05/25/18 0032     IP VTE HIGH RISK PATIENT  Once      05/25/18 0032          Disposition: day 7 of induction chemotherapy. Awaiting count recover, day 14 marrow and IT chemo.    Simon Luong MD  Bone Marrow Transplant  [facility]

## 2018-06-04 NOTE — PHYSICIAN QUERY
PT Name: Noreen Mac  MR #: [id number]     Physician Query Form - Documentation Clarification      Madonna Harmon RN CDS    Contact Information: [phone]    This form is a permanent document in the medical record.     Query Date: June 4, 2018    By submitting this query, we are merely seeking further clarification of documentation. Please utilize your independent clinical judgment when addressing the question(s) below.    The Medical record reflects the following:    Supporting Clinical Findings Location in Medical Record   Day 2 of induction chemotherapy today.   - tolerating therapy well thus far.   - remains afebrile and VSS stable.   Metoprolol held due to low BP.     HTN (hypertension) - takes metoprolol at home   - will hold at this time as HR/BP are controlled; plan to restart if HR/BP rise     Alteration in electrolyte and fluid balance - yesterday off IVF due to significant weight gain.   - noted to 2 L negative today.   - will start 50 cc/hr gentle hydration.     0.9%  NaCl infusion  :  100 mL/hr  Intravenous   Continuous    0.9 % NaCl infusion 50 ml/hr IV continuous 5/30 Bone Marrow Transplant PN            5/25 Bone Marrow Transplant PN        5/29 Bone Marrow Transplant PN        5/27-5/28 MAR    5/29 MAR   /53  /51  /52  BP 95/46  /53       5/25 -6/4 VS Flowsheet                                                                             Doctor, Please specify diagnosis or diagnoses associated with above clinical findings.    Provider Use Only      [  ] Drug Induced Hypotension    [ x ] Idiopathic Hypotension    [  ] Other Conditions (Specify)__________________________                                                                                                               [  ] Clinically undetermined

## 2018-06-04 NOTE — PLAN OF CARE
Problem: Patient Care Overview  Goal: Plan of Care Review  Outcome: Ongoing (interventions implemented as appropriate)  AAOx4, bed in low and locked position, call bell within reach, voids via hat, no falls. Patient complained of pain and lack of sleep; medication administered. Day 7 of 7 and 3. No complaints of nausea, afebrile. Patient stable, vitals stable, will continue to monitor.

## 2018-06-04 NOTE — PROGRESS NOTES
SW met with pt at bedside too provide support, pt stated she is doing well. SW will continue to follow.    Radha Urias [facility]  Oncology Social Worker  Phone: [phone]

## 2018-06-04 NOTE — TELEPHONE ENCOUNTER
Initial Rydapt consult declined by patient as she states she is inpatient and has already gone over medication.  Rydapt will be delivered to bedside  (med to nurse).  $2716.72 copay. Patient plans to start Rydapt on 2018 while inpatient as patient supplied medication.  Address confirmed, CC on file. Confirmed 2 patient identifiers - name and . Therapy Appropriate.     Patient was counseled on the administration directions:  -Take 2 tablets (50mg) by mouth twice daily on days 8 to 21 of cycle    DDIs:  Medication list reviewed, potential increase risk for QTc prolongation with ciprofloxacin, diphenhydramine, escitalopram, famotidine, ondansetron.  Voriconazole may increase serum concentrations of Rydapt - monitor for side effects especially during first week or Rydapt administration per cycle    2018 - QT/QTc 372/455 ms    Patient confirmed understanding. Patient did not have additional questions.

## 2018-06-04 NOTE — SUBJECTIVE & OBJECTIVE
Subjective:     Interval History:   - Day 7 of (7+3) chemotherapy  - tolerating treatment well so far    Objective:     Vital Signs (Most Recent):  Temp: 97.8 °F (36.6 °C) (06/04/18 1156)  Pulse: 85 (06/04/18 1156)  Resp: 19 (06/04/18 1156)  BP: (!) 103/53 (06/04/18 1156)  SpO2: 98 % (06/04/18 1156) Vital Signs (24h Range):  Temp:  [97.8 °F (36.6 °C)-99.6 °F (37.6 °C)] 97.8 °F (36.6 °C)  Pulse:  [63-86] 85  Resp:  [14-19] 19  SpO2:  [95 %-99 %] 98 %  BP: (100-133)/(53-62) 103/53     Weight: 73.6 kg (162 lb 5.9 oz)  Body mass index is 28.76 kg/m².  Body surface area is 1.81 meters squared.    ECOG SCORE         [unfilled]    Intake/Output - Last 3 Shifts       06/02 0700 - 06/03 0659 06/03 0700 - 06/04 0659 06/04 0700 - 06/05 0659    P.O. 680 600     I.V. (mL/kg) 100 (1.4)      Total Intake(mL/kg) 780 (10.6) 600 (8.2)     Urine (mL/kg/hr) 3050 (1.7) 3400 (1.9)     Total Output 3050 3400      Net -2270 -2800                   Physical Exam   Constitutional: She is oriented to person, place, and time. She appears well-developed and well-nourished. No distress.   HENT:   Head: Normocephalic and atraumatic.   Mouth/Throat: Oropharynx is clear and moist. No oropharyngeal exudate.   Eyes: EOM are normal. Pupils are equal, round, and reactive to light.   Neck: Normal range of motion.   Cardiovascular: Normal rate, regular rhythm and normal heart sounds.    No murmur heard.  Pulmonary/Chest: Effort normal and breath sounds normal. No respiratory distress.   Abdominal: Soft. Bowel sounds are normal. She exhibits no distension. There is no tenderness.   Musculoskeletal: Normal range of motion. She exhibits no edema.   Neurological: She is alert and oriented to person, place, and time.   Skin: Skin is warm and dry. Capillary refill takes less than 2 seconds. No rash noted.       Significant Labs:   CBC:   Recent Labs  Lab 06/03/18  0350 06/04/18  0304   WBC 0.58* 0.42*   HGB 7.9* 7.2*   HCT 23.3* 21.8*   PLT 10* 28*    and CMP:    Recent Labs  Lab 06/03/18  0350 06/04/18  0304    138   K 4.4 4.0    107   CO2 24 23   GLU 95 104   BUN 14 10   CREATININE 0.7 0.7   CALCIUM 8.4* 7.6*   PROT 5.7* 5.7*   ALBUMIN 2.7* 2.8*   BILITOT 0.9 0.4   ALKPHOS 94 92   AST 11 8*   ALT 11 8*   ANIONGAP 7* 8   EGFRNONAA >60.0 >60.0       Diagnostic Results:  I have reviewed all pertinent imaging results/findings within the past 24 hours.

## 2018-06-05 PROBLEM — N76.81: Status: ACTIVE | Noted: 2018-06-05

## 2018-06-05 LAB
ALBUMIN SERPL BCP-MCNC: 3 G/DL
ALP SERPL-CCNC: 100 U/L
ALT SERPL W/O P-5'-P-CCNC: 7 U/L
ANION GAP SERPL CALC-SCNC: 6 MMOL/L
ANISOCYTOSIS BLD QL SMEAR: SLIGHT
AST SERPL-CCNC: 9 U/L
BASOPHILS # BLD AUTO: ABNORMAL K/UL
BASOPHILS NFR BLD: 0 %
BILIRUB SERPL-MCNC: 0.6 MG/DL
BUN SERPL-MCNC: 8 MG/DL
CALCIUM SERPL-MCNC: 8.1 MG/DL
CHLORIDE SERPL-SCNC: 107 MMOL/L
CHROM BANDING METHOD: NORMAL
CHROMOSOME ANALYSIS BM ADDITIONAL INFORMATION: NORMAL
CHROMOSOME ANALYSIS BM RELEASED BY: NORMAL
CHROMOSOME ANALYSIS BM RESULT SUMMARY: NORMAL
CLINICAL CYTOGENETICIST REVIEW: NORMAL
CO2 SERPL-SCNC: 21 MMOL/L
CREAT SERPL-MCNC: 0.7 MG/DL
DIFFERENTIAL METHOD: ABNORMAL
EOSINOPHIL # BLD AUTO: ABNORMAL K/UL
EOSINOPHIL NFR BLD: 0 %
ERYTHROCYTE [DISTWIDTH] IN BLOOD BY AUTOMATED COUNT: 14 %
EST. GFR  (AFRICAN AMERICAN): >60 ML/MIN/1.73 M^2
EST. GFR  (NON AFRICAN AMERICAN): >60 ML/MIN/1.73 M^2
GLUCOSE SERPL-MCNC: 104 MG/DL
HCT VFR BLD AUTO: 22.4 %
HGB BLD-MCNC: 7.5 G/DL
HLA HI RES SEQUNCE BASED TYPING TEST DATE: NORMAL
HLA-A1 HI RES: NORMAL
HLA-A2 HI RES: NORMAL
HLA-B1 HI RES: NORMAL
HLA-B2 HI RES: NORMAL
HLA-C1 HI RES: NORMAL
HLA-C2 HI RES: NORMAL
HLA-DQB1 1 HI RES: NORMAL
HLA-DQB1 2 HI RES: NORMAL
HLA-DRB1 1 HI RES: NORMAL
HLA-DRB1 2 HI RES: NORMAL
HLA-DRB3 1 HI RES: NORMAL
HLA-DRB3 2 HI RES: NORMAL
HLA-DRB4 1 HI RES: NORMAL
HLA-DRB4 2 HI RES: NORMAL
HLA-DRB5 1 HI RES: NORMAL
HLA-DRB5 2 HI RES: NORMAL
HYPOCHROMIA BLD QL SMEAR: ABNORMAL
IMM GRANULOCYTES # BLD AUTO: ABNORMAL K/UL
IMM GRANULOCYTES NFR BLD AUTO: ABNORMAL %
KARYOTYP MAR: NORMAL
LYMPHOCYTES # BLD AUTO: ABNORMAL K/UL
LYMPHOCYTES NFR BLD: 100 %
MAGNESIUM SERPL-MCNC: 1.2 MG/DL
MCH RBC QN AUTO: 32.1 PG
MCHC RBC AUTO-ENTMCNC: 33.5 G/DL
MCV RBC AUTO: 96 FL
MONOCYTES # BLD AUTO: ABNORMAL K/UL
MONOCYTES NFR BLD: 0 %
NEUTROPHILS NFR BLD: 0 %
NRBC BLD-RTO: 0 /100 WBC
PHOSPHATE SERPL-MCNC: 2 MG/DL
PLATELET # BLD AUTO: 14 K/UL
PLATELET BLD QL SMEAR: ABNORMAL
PMV BLD AUTO: 11.7 FL
POTASSIUM SERPL-SCNC: 3.8 MMOL/L
PROT SERPL-MCNC: 6.6 G/DL
RBC # BLD AUTO: 2.34 M/UL
REASON FOR REFERRAL (NARRATIVE): NORMAL
REF LAB TEST METHOD: NORMAL
SODIUM SERPL-SCNC: 134 MMOL/L
SPECIMEN SOURCE: NORMAL
SPECIMEN: NORMAL
WBC # BLD AUTO: 0.37 K/UL

## 2018-06-05 PROCEDURE — 99233 SBSQ HOSP IP/OBS HIGH 50: CPT | Mod: ,,, | Performed by: INTERNAL MEDICINE

## 2018-06-05 PROCEDURE — 85007 BL SMEAR W/DIFF WBC COUNT: CPT

## 2018-06-05 PROCEDURE — 85027 COMPLETE CBC AUTOMATED: CPT

## 2018-06-05 PROCEDURE — 25000003 PHARM REV CODE 250: Performed by: STUDENT IN AN ORGANIZED HEALTH CARE EDUCATION/TRAINING PROGRAM

## 2018-06-05 PROCEDURE — 25000003 PHARM REV CODE 250: Performed by: INTERNAL MEDICINE

## 2018-06-05 PROCEDURE — 97530 THERAPEUTIC ACTIVITIES: CPT

## 2018-06-05 PROCEDURE — 20600001 HC STEP DOWN PRIVATE ROOM

## 2018-06-05 PROCEDURE — 63600175 PHARM REV CODE 636 W HCPCS: Performed by: HOSPITALIST

## 2018-06-05 PROCEDURE — 25000003 PHARM REV CODE 250: Performed by: HOSPITALIST

## 2018-06-05 PROCEDURE — 94760 N-INVAS EAR/PLS OXIMETRY 1: CPT

## 2018-06-05 PROCEDURE — 97802 MEDICAL NUTRITION INDIV IN: CPT

## 2018-06-05 PROCEDURE — 80053 COMPREHEN METABOLIC PANEL: CPT

## 2018-06-05 PROCEDURE — 63600175 PHARM REV CODE 636 W HCPCS: Performed by: INTERNAL MEDICINE

## 2018-06-05 PROCEDURE — 84100 ASSAY OF PHOSPHORUS: CPT

## 2018-06-05 PROCEDURE — 83735 ASSAY OF MAGNESIUM: CPT

## 2018-06-05 PROCEDURE — A4216 STERILE WATER/SALINE, 10 ML: HCPCS | Performed by: INTERNAL MEDICINE

## 2018-06-05 RX ORDER — SODIUM,POTASSIUM PHOSPHATES 280-250MG
2 POWDER IN PACKET (EA) ORAL ONCE
Status: COMPLETED | OUTPATIENT
Start: 2018-06-05 | End: 2018-06-05

## 2018-06-05 RX ORDER — CLOBETASOL PROPIONATE 0.5 MG/G
OINTMENT TOPICAL 2 TIMES DAILY
Status: DISCONTINUED | OUTPATIENT
Start: 2018-06-05 | End: 2018-06-07

## 2018-06-05 RX ORDER — DIPHENHYDRAMINE HCL 25 MG
25 CAPSULE ORAL EVERY 6 HOURS PRN
Status: DISCONTINUED | OUTPATIENT
Start: 2018-06-05 | End: 2018-06-05

## 2018-06-05 RX ORDER — MAGNESIUM SULFATE HEPTAHYDRATE 40 MG/ML
2 INJECTION, SOLUTION INTRAVENOUS ONCE
Status: COMPLETED | OUTPATIENT
Start: 2018-06-05 | End: 2018-06-05

## 2018-06-05 RX ORDER — MORPHINE SULFATE 2 MG/ML
2 INJECTION, SOLUTION INTRAMUSCULAR; INTRAVENOUS EVERY 6 HOURS PRN
Status: DISCONTINUED | OUTPATIENT
Start: 2018-06-05 | End: 2018-06-06

## 2018-06-05 RX ORDER — MICONAZOLE NITRATE 2 %
POWDER (GRAM) TOPICAL 2 TIMES DAILY PRN
Status: DISCONTINUED | OUTPATIENT
Start: 2018-06-05 | End: 2018-07-13 | Stop reason: HOSPADM

## 2018-06-05 RX ORDER — MORPHINE SULFATE 10 MG/ML
2 INJECTION INTRAMUSCULAR; INTRAVENOUS; SUBCUTANEOUS ONCE
Status: COMPLETED | OUTPATIENT
Start: 2018-06-05 | End: 2018-06-05

## 2018-06-05 RX ORDER — MORPHINE SULFATE 10 MG/ML
2 INJECTION INTRAMUSCULAR; INTRAVENOUS; SUBCUTANEOUS EVERY 6 HOURS PRN
Status: DISCONTINUED | OUTPATIENT
Start: 2018-06-05 | End: 2018-06-05

## 2018-06-05 RX ADMIN — ONDANSETRON 4 MG: 2 INJECTION INTRAMUSCULAR; INTRAVENOUS at 12:06

## 2018-06-05 RX ADMIN — ONDANSETRON 4 MG: 2 INJECTION INTRAMUSCULAR; INTRAVENOUS at 09:06

## 2018-06-05 RX ADMIN — VORICONAZOLE 200 MG: 200 TABLET, FILM COATED ORAL at 08:06

## 2018-06-05 RX ADMIN — ALPRAZOLAM 0.25 MG: 0.25 TABLET ORAL at 06:06

## 2018-06-05 RX ADMIN — MAGNESIUM SULFATE IN WATER 2 G: 40 INJECTION, SOLUTION INTRAVENOUS at 08:06

## 2018-06-05 RX ADMIN — CIPROFLOXACIN HYDROCHLORIDE 500 MG: 500 TABLET, FILM COATED ORAL at 08:06

## 2018-06-05 RX ADMIN — ALLOPURINOL 300 MG: 300 TABLET ORAL at 08:06

## 2018-06-05 RX ADMIN — MORPHINE SULFATE 2 MG: 10 INJECTION INTRAVENOUS at 06:06

## 2018-06-05 RX ADMIN — FAMOTIDINE 20 MG: 20 TABLET ORAL at 08:06

## 2018-06-05 RX ADMIN — ESCITALOPRAM 10 MG: 5 TABLET, FILM COATED ORAL at 08:06

## 2018-06-05 RX ADMIN — Medication 10 ML: at 06:06

## 2018-06-05 RX ADMIN — MICONAZOLE NITRATE: 20 POWDER TOPICAL at 04:06

## 2018-06-05 RX ADMIN — METOPROLOL SUCCINATE 25 MG: 25 TABLET, EXTENDED RELEASE ORAL at 08:06

## 2018-06-05 RX ADMIN — DIPHENHYDRAMINE HYDROCHLORIDE 25 MG: 25 CAPSULE ORAL at 06:06

## 2018-06-05 RX ADMIN — ACYCLOVIR 400 MG: 200 CAPSULE ORAL at 08:06

## 2018-06-05 RX ADMIN — RAMELTEON 8 MG: 8 TABLET, FILM COATED ORAL at 08:06

## 2018-06-05 RX ADMIN — MORPHINE SULFATE 2 MG: 10 INJECTION INTRAVENOUS at 09:06

## 2018-06-05 RX ADMIN — PROCHLORPERAZINE MALEATE 10 MG: 5 TABLET, FILM COATED ORAL at 06:06

## 2018-06-05 RX ADMIN — POTASSIUM & SODIUM PHOSPHATES POWDER PACK 280-160-250 MG 2 PACKET: 280-160-250 PACK at 08:06

## 2018-06-05 RX ADMIN — CLOBETASOL PROPIONATE: 0.5 OINTMENT TOPICAL at 09:06

## 2018-06-05 NOTE — SUBJECTIVE & OBJECTIVE
Past Medical History:   Diagnosis Date    Hypertension        Past Surgical History:   Procedure Laterality Date    CHOLECYSTECTOMY      HYSTERECTOMY      TONSILLECTOMY       Family History     Problem Relation (Age of Onset)    Cancer Mother, Father        Social History Main Topics    Smoking status: Former Smoker     Packs/day: 0.50     Years: 10.00     Types: Cigarettes     Quit date: 3/25/2017    Smokeless tobacco: Not on file    Alcohol use 0.6 oz/week     1 Shots of liquor per week    Drug use: No    Sexual activity: Yes     Partners: Female       Review of patient's allergies indicates:   Allergen Reactions    Methotrexate analogues      Elevated Liver Enzyme    Bactrim [sulfamethoxazole-trimethoprim] Nausea And Vomiting and Rash       Medications:  Continuous Infusions:  Scheduled Meds:   acyclovir  400 mg Oral BID    allopurinol  300 mg Oral Daily    ciprofloxacin HCl  500 mg Oral Q12H    cytarabine (CYTOSAR) chemo infusion  100 mg/m2/day (Treatment Plan Recorded) Intravenous Q24H    escitalopram oxalate  10 mg Oral Daily    famotidine  20 mg Oral BID    metoprolol succinate  25 mg Oral Daily    midostaurin  50 mg Oral BID WM    sodium chloride 0.9%  10 mL Intravenous Q6H    voriconazole  200 mg Oral BID     PRN Meds:acetaminophen, ALPRAZolam, alteplase, bisacodyl, calcium carbonate, diphenhydrAMINE, heparin, porcine (PF), miconazole NITRATE 2 %, ondansetron, prochlorperazine, ramelteon, sodium chloride, Flushing PICC Protocol **AND** sodium chloride 0.9% **AND** sodium chloride 0.9%, sodium chloride 0.9%    Review of Systems   HENT: Negative for mouth sores.    Genitourinary: Positive for genital sores. Negative for dysuria and hematuria.   Skin: Negative for itching, rash and dry lips.     Objective:     Vital Signs (Most Recent):  Temp: 99.9 °F (37.7 °C) (06/05/18 0825)  Pulse: 66 (06/05/18 0825)  Resp: 18 (06/05/18 0825)  BP: (!) 107/52 (06/05/18 0825)  SpO2: 98 % (06/05/18 0825)  Vital Signs (24h Range):  Temp:  [97.8 °F (36.6 °C)-99.9 °F (37.7 °C)] 99.9 °F (37.7 °C)  Pulse:  [65-85] 66  Resp:  [16-20] 18  SpO2:  [97 %-99 %] 98 %  BP: (103-139)/(52-63) 107/52     Weight: 71.7 kg (158 lb 1.1 oz)  Body mass index is 28 kg/m².    Physical Exam   Constitutional: She appears well-developed and well-nourished.   HENT:   No oral or mucosal lesions noted   Neurological: She is alert and oriented to person, place, and time.   Psychiatric: She has a normal mood and affect.   Skin:   Areas Examined (abnormalities noted in diagram):   Head / Face Inspection Performed  Genitals / Buttocks / Groin Inspection Performed  RUE Inspected  LUE Inspection Performed  RLE Inspected  LLE Inspection Performed  Labia majora and minora with erythematous papules coalescing into a larger plaque; papules extending onto inguinal crease; no ulcers noted

## 2018-06-05 NOTE — ASSESSMENT & PLAN NOTE
She developed painful erythematous maculopapular rash overnight. Unclear etiology at this time.  - morphine 2 mg IV has seemed to relieve pain  - will consult Derm for further recommendations.  - continue to monitor

## 2018-06-05 NOTE — PROGRESS NOTES
[facility]  Hematology  Bone Marrow Transplant  Progress Note    Patient Name: Noreen Mac  Admission Date: 5/24/2018  Hospital Length of Stay: 12 days  Code Status: Full Code    Subjective:     Interval History:   - Day 8 of (7+ 3) induction today  - tolerating chemo generally well  - developed painful, labial rash overnight    Objective:     Vital Signs (Most Recent):  Temp: 98.4 °F (36.9 °C) (06/05/18 1206)  Pulse: 74 (06/05/18 1206)  Resp: 17 (06/05/18 1206)  BP: (!) 101/54 (06/05/18 1206)  SpO2: 100 % (06/05/18 1206) Vital Signs (24h Range):  Temp:  [98.1 °F (36.7 °C)-99.9 °F (37.7 °C)] 98.4 °F (36.9 °C)  Pulse:  [65-74] 74  Resp:  [16-20] 17  SpO2:  [97 %-100 %] 100 %  BP: (101-139)/(52-63) 101/54     Weight: 71.7 kg (158 lb 1.1 oz)  Body mass index is 28 kg/m².  Body surface area is 1.79 meters squared.    ECOG SCORE           Intake/Output - Last 3 Shifts       06/03 0700 - 06/04 0659 06/04 0700 - 06/05 0659 06/05 0700 - 06/06 0659    P.O. 600      I.V. (mL/kg)   50 (0.7)    IV Piggyback  2126.6 1040.9    Total Intake(mL/kg) 600 (8.2) 2126.6 (29.7) 1090.9 (15.2)    Urine (mL/kg/hr) 3400 (1.9) 1000 (0.6) 350 (0.6)    Total Output 3400 1000 350    Net -2800 +1126.6 +740.9                 Physical Exam   Constitutional: She is oriented to person, place, and time. She appears well-developed and well-nourished.   HENT:   Head: Normocephalic and atraumatic.   Eyes: EOM are normal. Pupils are equal, round, and reactive to light.   Cardiovascular: Normal rate, regular rhythm and normal heart sounds.    No murmur heard.  Pulmonary/Chest: Effort normal and breath sounds normal.   Abdominal: Soft. Bowel sounds are normal. There is no tenderness.   Genitourinary:   Genitourinary Comments: + labial rash extending into inguinal fold bilaterally. Maculopapular.   Musculoskeletal: She exhibits no edema.   Neurological: She is alert and oriented to person, place, and time.   Skin: Skin is warm and  dry. Capillary refill takes less than 2 seconds. Rash noted. She is not diaphoretic.   Nursing note and vitals reviewed.      Significant Labs:   CBC:   Recent Labs  Lab 06/04/18  0304 06/05/18  0410   WBC 0.42* 0.37*   HGB 7.2* 7.5*   HCT 21.8* 22.4*   PLT 28* 14*    and CMP:   Recent Labs  Lab 06/04/18  0304 06/05/18  0616    134*   K 4.0 3.8    107   CO2 23 21*    104   BUN 10 8   CREATININE 0.7 0.7   CALCIUM 7.6* 8.1*   PROT 5.7* 6.6   ALBUMIN 2.8* 3.0*   BILITOT 0.4 0.6   ALKPHOS 92 100   AST 8* 9*   ALT 8* 7*   ANIONGAP 8 6*   EGFRNONAA >60.0 >60.0       Diagnostic Results:  I have reviewed all pertinent imaging results/findings within the past 24 hours.    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 Ordered routine labs in addition to FLT3, NGS, and AML FISH.  - lo res HLA typing on 5/26/18 and hi res on 5/27/18 done in anticipation of possible need for future transplant   - Pt with two daughters, two full sisters (in their mid 60s, one with brain aneurysm hx, other one without any medical issues), and one full brother (59 y/o healthy).  - ECHO ordered 5/25/18, EF 65%  - PICC line placed 5/25/18   - initially on hydrea, discontinued on 5/28.   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  - started on induction therapy on 5/29/18.  - Day 6 of induction therapy, tolerating treatment fairly well.    - On allopurinol 300 mg daily, risk for tumor lysis likely low at this point. Will taper tumor lysis labs to twice weekly (M-Th)  - prophylactic antimicrobials: Acyclovir, Cipro. Voriconazole to start tomorrow  - will need CNS Prophylaxis given her elevated WBC (high risk) on admission after count recovery.  - NPM1 +, CEBPA (-), FLT3 (+).    - on day of  induction chemotherapy  - generally tolerating treatment well aside from painful rash which she developed today  - plan to start Midostaurin 50 mg PO BID today and continue through Day 14  - will plan for empty bone marrow biopsy on Monday or Tuesday or next week            Pancytopenia    - secondary to AML and chemotherapy.   - wbc: 320/mm3 with ANC: 100/mm3, Hb: 7.8 g/dL, and Plt: 13,000/mm3.   - responded appropriately to transfusion yesterday  - tranfuse if Hb < 7 g/dL and Plt < 10,000/mm3 or active bleed.           MCTD (mixed connective tissue disease)    - MCTD-NOS  - previously on MTX and Prednisone.   - off therapy now and otherwise doing well.   - can follow up outpatient with her Rheumatologist upon discharge.         Alteration in electrolyte and fluid balance    - Continues to drink plenty of water  - monitor daily electrolytes        Bilateral headaches    - complained of bilateral temporal headaches earlier in hospitalization course  - similar to ones on presentation at OSH  - CT Head done at OSH was unremarkable.   - will consider re-imaging if deemed necessary.   - no complaints of headache today  - PRN Tylenol for now.           Neutropenic fever    - issue resolved now.   - spiked temp on 5/29 of 102, afebrile since then.   - blood cultures no growth, CXR and UA unremarkable. Was on cefepime for 2 days.           Pulmonary nodule    - CXR suggestive of 7 mm slightly irregular shaped radiodensity projected over the left upper lung zone which could represent a calcified granuloma but remains indeterminate.  - CT Chest on 5/28: Multiple scattered noncalcified pulmonary granulomas, the largest measuring 8 mm in the lateral segment of the right middle lobe.   - repeat non-contrast chest CT at 6-12 months         Insomnia    - PRN nightly Xanax.         Rash of genital area    She developed painful erythematous maculopapular rash overnight. Unclear etiology at this time.  - morphine 2 mg IV has seemed  to relieve pain  - will consult Derm for further recommendations.  - continue to monitor        HTN (hypertension)    - history of SVT and reports intermittent palpitations at times.  - EKG on 5/28 - NSR.  - increased metoprolol to 25 mg QD,she feels better with this dose.        Electrolyte abnormality    - will replace electrolytes as needed.  - Mg Sulfate 2 gm IV for Mg of 1.4 this AM          GERD (gastroesophageal reflux disease)    - takes Omeprazole at home, now on pepcid here  - no issue         Osteoporosis    - takes prolia once every 6 months, last given mid April 2018   - no acute issue         Depression    - no acute issue   - continue home Escitalopram             VTE Risk Mitigation         Ordered     heparin, porcine (PF) 100 unit/mL injection flush 300 Units  As needed (PRN)      05/29/18 1024     Place MAYLIN hose  Until discontinued      05/25/18 0032     IP VTE HIGH RISK PATIENT  Once      05/25/18 0032          Disposition: completes induction chemotherapy today. Will need daily monitoring of blood counts and transfusion support. Bone marrow biopsy will be planned for 1 week from now to assess for response.     Simon Luong MD  Bone Marrow Transplant  [facility]

## 2018-06-05 NOTE — ASSESSMENT & PLAN NOTE
Nutrition Problem  increased nutrient needs    Related to (etiology):   Physiological needs 2/2 AML     Signs and Symptoms (as evidenced by):   Pt receiving 7+3 regimen (day+8 increased Caloric/ Protein needs)    Interventions/Recommendations (treatment strategy):  See recs above    Nutrition Diagnosis Status:   Continues

## 2018-06-05 NOTE — PLAN OF CARE
"Problem: Physical Therapy Goal  Goal: Physical Therapy Goal  Pt currently independent c functional mobility. Amb 200ft c no AD (I) - assistance c IV pole management.  PT will continue to follow pt 1x/wk while pt receives additional treatments to ensure pt's functional status does not decline.  POC will be revisited and updated accordingly.   Outcome: Ongoing (interventions implemented as appropriate)  Pt c c/o "rough couple of days" but still able to amb independently on this date. Will continue to monitor for additional week    Jose F Dunbar DPT  6/5/2018          "

## 2018-06-05 NOTE — CONSULTS
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 Mac is a 68 year old woman with AML admitted to the hospital on 5/28 for induction chemotherapy.     Overnight, around 2am, patient developed a painful rash in the labial area. She described a burning pain that worsened over the next 4 hours, requiring morphine and benadryl this morning. Patient reports after receiving these medications, her pain has subsided. She is currently undergoing chemotherapy, and is on voriconazole and acyclovir for infectious prophylaxis. Denies rash elsewhere. Denies dysuria or hematuria. Denies apply products to the area (creams, wipes, soaps). Per primary team, this is not a common medication reaction with her current chemotherapy regimen.      Past Medical History:   Diagnosis Date    Hypertension        Past Surgical History:   Procedure Laterality Date    CHOLECYSTECTOMY      HYSTERECTOMY      TONSILLECTOMY       Family History     Problem Relation (Age of Onset)    Cancer Mother, Father        Social History Main Topics    Smoking status: Former Smoker     Packs/day: 0.50     Years: 10.00     Types: Cigarettes     Quit date: 3/25/2017    Smokeless tobacco: Not on file    Alcohol use 0.6 oz/week     1 Shots of liquor per week    Drug use: No    Sexual activity: Yes     Partners: Female       Review of patient's allergies indicates:   Allergen Reactions    Methotrexate analogues      Elevated Liver Enzyme    Bactrim [sulfamethoxazole-trimethoprim] Nausea And Vomiting and Rash       Medications:  Continuous Infusions:  Scheduled Meds:   acyclovir  400 mg Oral BID    allopurinol  300 mg Oral Daily     ciprofloxacin HCl  500 mg Oral Q12H    cytarabine (CYTOSAR) chemo infusion  100 mg/m2/day (Treatment Plan Recorded) Intravenous Q24H    escitalopram oxalate  10 mg Oral Daily    famotidine  20 mg Oral BID    metoprolol succinate  25 mg Oral Daily    midostaurin  50 mg Oral BID WM    sodium chloride 0.9%  10 mL Intravenous Q6H    voriconazole  200 mg Oral BID     PRN Meds:acetaminophen, ALPRAZolam, alteplase, bisacodyl, calcium carbonate, diphenhydrAMINE, heparin, porcine (PF), miconazole NITRATE 2 %, ondansetron, prochlorperazine, ramelteon, sodium chloride, Flushing PICC Protocol **AND** sodium chloride 0.9% **AND** sodium chloride 0.9%, sodium chloride 0.9%    Review of Systems   HENT: Negative for mouth sores.    Genitourinary: Positive for genital sores. Negative for dysuria and hematuria.   Skin: Negative for itching, rash and dry lips.     Objective:     Vital Signs (Most Recent):  Temp: 99.9 °F (37.7 °C) (06/05/18 0825)  Pulse: 66 (06/05/18 0825)  Resp: 18 (06/05/18 0825)  BP: (!) 107/52 (06/05/18 0825)  SpO2: 98 % (06/05/18 0825) Vital Signs (24h Range):  Temp:  [97.8 °F (36.6 °C)-99.9 °F (37.7 °C)] 99.9 °F (37.7 °C)  Pulse:  [65-85] 66  Resp:  [16-20] 18  SpO2:  [97 %-99 %] 98 %  BP: (103-139)/(52-63) 107/52     Weight: 71.7 kg (158 lb 1.1 oz)  Body mass index is 28 kg/m².    Physical Exam   Constitutional: She appears well-developed and well-nourished.   HENT:   No oral or mucosal lesions noted   Neurological: She is alert and oriented to person, place, and time.   Psychiatric: She has a normal mood and affect.   Skin:   Areas Examined (abnormalities noted in diagram):   Head / Face Inspection Performed  Genitals / Buttocks / Groin Inspection Performed  RUE Inspected  LUE Inspection Performed  RLE Inspected  LLE Inspection Performed  Labia majora and minora with erythematous papules coalescing into a larger plaque; papules extending onto inguinal crease; no ulcers noted       Assessment/Plan:      Vaginal mucositis    Patient with acute vaginal mucositis, most likely secondary to chemotherapy. Rash not consistent with infection (HSV, candida, bacteria). DDx also includes contact dermatitis, though patient denies using any cream/soaps/wipes/produts in the area.   - Recommend gentle skin care to the area. Avoid harsh soaps and wipes.   - Start Clobetasol 0.05% ointment BID to the area   - Continue pain management per primary team   - Please contact us if rash worsens or does not improve. Will continue to follow patient.    Patient staffed with Dr. Kings Church   Dermatology               Thank you for your consult. I will follow-up with patient. Please contact us if you have any additional questions.    Madonna Church MD  Dermatology  [facility] [address]

## 2018-06-05 NOTE — SUBJECTIVE & OBJECTIVE
Subjective:     Interval History:   - Day 8 of (7+ 3) induction today  - tolerating chemo generally well  - developed painful, labial rash overnight    Objective:     Vital Signs (Most Recent):  Temp: 98.4 °F (36.9 °C) (06/05/18 1206)  Pulse: 74 (06/05/18 1206)  Resp: 17 (06/05/18 1206)  BP: (!) 101/54 (06/05/18 1206)  SpO2: 100 % (06/05/18 1206) Vital Signs (24h Range):  Temp:  [98.1 °F (36.7 °C)-99.9 °F (37.7 °C)] 98.4 °F (36.9 °C)  Pulse:  [65-74] 74  Resp:  [16-20] 17  SpO2:  [97 %-100 %] 100 %  BP: (101-139)/(52-63) 101/54     Weight: 71.7 kg (158 lb 1.1 oz)  Body mass index is 28 kg/m².  Body surface area is 1.79 meters squared.    ECOG SCORE           Intake/Output - Last 3 Shifts       06/03 0700 - 06/04 0659 06/04 0700 - 06/05 0659 06/05 0700 - 06/06 0659    P.O. 600      I.V. (mL/kg)   50 (0.7)    IV Piggyback  2126.6 1040.9    Total Intake(mL/kg) 600 (8.2) 2126.6 (29.7) 1090.9 (15.2)    Urine (mL/kg/hr) 3400 (1.9) 1000 (0.6) 350 (0.6)    Total Output 3400 1000 350    Net -2800 +1126.6 +740.9                 Physical Exam   Constitutional: She is oriented to person, place, and time. She appears well-developed and well-nourished.   HENT:   Head: Normocephalic and atraumatic.   Eyes: EOM are normal. Pupils are equal, round, and reactive to light.   Cardiovascular: Normal rate, regular rhythm and normal heart sounds.    No murmur heard.  Pulmonary/Chest: Effort normal and breath sounds normal.   Abdominal: Soft. Bowel sounds are normal. There is no tenderness.   Genitourinary:   Genitourinary Comments: + labial rash extending into inguinal fold bilaterally. Maculopapular.   Musculoskeletal: She exhibits no edema.   Neurological: She is alert and oriented to person, place, and time.   Skin: Skin is warm and dry. Capillary refill takes less than 2 seconds. Rash noted. She is not diaphoretic.   Nursing note and vitals reviewed.      Significant Labs:   CBC:   Recent Labs  Lab 06/04/18  0304 06/05/18  0410    WBC 0.42* 0.37*   HGB 7.2* 7.5*   HCT 21.8* 22.4*   PLT 28* 14*    and CMP:   Recent Labs  Lab 06/04/18  0304 06/05/18  0616    134*   K 4.0 3.8    107   CO2 23 21*    104   BUN 10 8   CREATININE 0.7 0.7   CALCIUM 7.6* 8.1*   PROT 5.7* 6.6   ALBUMIN 2.8* 3.0*   BILITOT 0.4 0.6   ALKPHOS 92 100   AST 8* 9*   ALT 8* 7*   ANIONGAP 8 6*   EGFRNONAA >60.0 >60.0       Diagnostic Results:  I have reviewed all pertinent imaging results/findings within the past 24 hours.

## 2018-06-05 NOTE — PT/OT/SLP PROGRESS
"Physical Therapy Treatment    Patient Name:  Noreen Mac   MRN:  [id number]    Recommendations:     Discharge Recommendations:  home   Discharge Equipment Recommendations: none   Barriers to discharge: None    Assessment:     Noreen Mac is a 68 y.o. 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  She presents with the following impairments/functional limitations:  other (comment) (potential physical decline d/t chemo treatments).  Pt tolerated session c min c/o fatigue.  Activity limited on this date as pt reports just finishing walk prior to visit.  Agreeable to short distance amb in room before requesting to return to bed to rest.  Pt demo independence c functional mobility.  Pt educated on importance of mobility and OOb activities. Pt would benefit from continued skilled acute PT 1x/wk to ensure she maintains independence c functional mobility.      Rehab Prognosis:  good; patient would benefit from acute skilled PT services to address these deficits and reach maximum level of function.      Recent Surgery: * No surgery found *      Plan:     During this hospitalization, patient to be seen 1 x/week to address the above listed problems via gait training, therapeutic activities, therapeutic exercises, neuromuscular re-education  · Plan of Care Expires:  06/29/18   Plan of Care Reviewed with: patient    Subjective     Communicated with RN prior to session.  Patient found supine upon PT entry to room, agreeable to treatment.      Chief Complaint: fatigue  Patient comments/goals: "I just got comfortable."  Pain/Comfort:  · Pain Rating 1: 0/10    Patients cultural, spiritual, Advent conflicts given the current situation: none    Objective:     Patient found with: peripheral IV     General Precautions: Standard, fall   Orthopedic Precautions:N/A   Braces: N/A     Functional Mobility:  · Bed Mobility:     · Rolling Right: independence  · Scooting: " independence  · Supine to Sit: independence  · Sit to Supine: independence  · Transfers:     · Sit to Stand:  independence with no AD  · Gait: 10ft c no AD (I)  · Balance: dynamic standing (I)\      AM-PAC 6 CLICK MOBILITY  Turning over in bed (including adjusting bedclothes, sheets and blankets)?: 4  Sitting down on and standing up from a chair with arms (e.g., wheelchair, bedside commode, etc.): 4  Moving from lying on back to sitting on the side of the bed?: 4  Moving to and from a bed to a chair (including a wheelchair)?: 4  Need to walk in hospital room?: 4  Climbing 3-5 steps with a railing?: 4  Total Score: 24       Therapeutic Activities and Exercises:  Educated on PT role/POC; safety c mobility; benefits of OOB activities; performing BLE exercises daily  Therex: (AP, LAQ, hip flex) 1x10 ea    Patient left supine with all lines intact, call button in reach and RN notified..    GOALS:    Physical Therapy Goals        Problem: Physical Therapy Goal    Goal Priority Disciplines Outcome Goal Variances Interventions   Physical Therapy Goal     PT/OT, PT Ongoing (interventions implemented as appropriate)     Description:  Pt currently independent c functional mobility. Amb 200ft c no AD (I) - assistance c IV pole management.  PT will continue to follow pt 1x/wk while pt receives additional treatments to ensure pt's functional status does not decline.  POC will be revisited and updated accordingly.                    Time Tracking:     PT Received On: 06/05/18  PT Start Time: 1210     PT Stop Time: 1220  PT Total Time (min): 10 min     Billable Minutes: Therapeutic Activity 10 min    Treatment Type: Treatment  PT/PTA: PT           Jose F Dunbar, PT  06/05/2018

## 2018-06-05 NOTE — PLAN OF CARE
Problem: Oncology Care (Adult)  Intervention: Prevent Deficiencies/Improve Nutritional Intake   06/05/18 1343   Nutrition Interventions   Oral Nutrition Promotion calorie dense foods provided;nutritional therapy counseling provided   Recommendations     Recommendation/Intervention: 1. Continue w/ Regular diet. 2.Encourage PO intake >/= 75% EEN/EPN 3. 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

## 2018-06-05 NOTE — PLAN OF CARE
Problem: Patient Care Overview  Goal: Plan of Care Review  Outcome: Ongoing (interventions implemented as appropriate)  Pt AAOx4; independent. Vital signs stable and pt remained afebrile throughout shift. Magnesium and K Phos replaced this shift; Cytarabine stopped during shift, pt to start oral chemo this evening with dinner.  Pt very fatigued today after events last night; rested comfortably in bed throughout the shift.  Pt remained free from falls during shift.  Bed lowest position and locked.  Call light in reach.  [facility]

## 2018-06-05 NOTE — ASSESSMENT & PLAN NOTE
- Admitted from [facility] on 5/25/18 early AM with WBC around 100s, for 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 Ordered routine labs in addition to FLT3, NGS, and AML FISH.  - lo res HLA typing on 5/26/18 and hi res on 5/27/18 done in anticipation of possible need for future transplant   - Pt with two daughters, two full sisters (in their mid 60s, one with brain aneurysm hx, other one without any medical issues), and one full brother (59 y/o healthy).  - ECHO ordered 5/25/18, EF 65%  - PICC line placed 5/25/18   - initially on hydrea, discontinued on 5/28.   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  - started on induction therapy on 5/29/18.  - Day 6 of induction therapy, tolerating treatment fairly well.    - On allopurinol 300 mg daily, risk for tumor lysis likely low at this point. Will taper tumor lysis labs to twice weekly (M-Th)  - prophylactic antimicrobials: Acyclovir, Cipro. Voriconazole to start tomorrow  - will need CNS Prophylaxis given her elevated WBC (high risk) on admission after count recovery.  - NPM1 +, CEBPA (-), FLT3 (+).    - on day of induction chemotherapy  - generally tolerating treatment well aside from painful rash which she developed today  - plan to start Midostaurin 50 mg PO BID today and continue through Day 14  - will plan for empty bone marrow biopsy on Monday or Tuesday or next week

## 2018-06-05 NOTE — PLAN OF CARE
Problem: Patient Care Overview  Goal: Plan of Care Review  Outcome: Ongoing (interventions implemented as appropriate)  Pt on day 7 of chemotherapy. Pt with nausea- zofran given. Pt reports burning to labia this morning. Labia appears red and irritated. MD informed, miconazole powder ordered. Instructed patient to call for assistance, bed low and locked, call bell within reach, nonskid socks on, patient verbalized understanding.

## 2018-06-05 NOTE — ASSESSMENT & PLAN NOTE
Patient with acute vaginal mucositis, most likely secondary to chemotherapy. Rash not consistent with infection (HSV, candida, bacteria). DDx also includes contact dermatitis, though patient denies using any cream/soaps/wipes/produts in the area.   - Recommend gentle skin care to the area. Avoid harsh soaps and wipes.   - Start Clobetasol 0.05% ointment BID to the area   - Continue pain management per primary team   - Please contact us if rash worsens or does not improve. Will continue to follow patient.    Patient staffed with Dr. Kings Church   Dermatology

## 2018-06-05 NOTE — PROGRESS NOTES
"Pt complaining of a severe "burning pain" to labia,  labia red and inflamed. Started around 4am.  MD at bedside to assess. Morphine and benadryl given. Derm c/s placed. Will monitor.  "

## 2018-06-05 NOTE — PLAN OF CARE
MDR's with Dr Tong.  Patient is day 8 of 7+3.  Patient developed a perineal rash overnight.  Derm consulted.  ANC 0.  Next BMB planned for day 14.  Will continue to follow.

## 2018-06-05 NOTE — HPI
Ms. Mac is a 68 year old woman with AML admitted to the hospital on 5/28 for induction chemotherapy.     Overnight, around 2am, patient developed a painful rash in the labial area. She described a burning pain that worsened over the next 4 hours, requiring morphine and benadryl this morning. Patient reports after receiving these medications, her pain has subsided. She is currently undergoing chemotherapy, and is on voriconazole and acyclovir for infectious prophylaxis. Denies rash elsewhere. Denies dysuria or hematuria. Denies apply products to the area (creams, wipes, soaps). Per primary team, this is not a common medication reaction with her current chemotherapy regimen.

## 2018-06-05 NOTE — CONSULTS
"  [facility]  Adult Nutrition  Consult Note    SUMMARY     Recommendations    Recommendation/Intervention: 1. Continue w/ Regular diet. 2.Encourage PO intake >/= 75% EEN/EPN 3. If PO intake is <50% encourage HVP w/ each meal, small frequent meal, snacks, ONS 4.RD to follow  Goals: pt to consume nutrients >/= 85% EEN/EPN   Nutrition Goal Status: new  Communication of RD Recs: discussed on rounds    Reason for Assessment    Reason for Assessment: length of stay  Diagnosis:  (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)  Relevant Medical History: HTN  Interdisciplinary Rounds: attended  General Information Comments: pt has no dietary complaints. pt states she has been enjoying the food, po intake fair 25-50%, due to lack of activity. denies NVDC, mouth sores.   Nutrition Discharge Planning: Regular diet w/ po intake 75% EEN/ EPN + ONS as needed to maintain elevated Kcal/ Protein needs    Nutrition Risk Screen    Nutrition Risk Screen: no indicators present    Nutrition/Diet History    Patient Reported Diet/Restrictions/Preferences: general  Food Preferences: none  Do you have any cultural, spiritual, Zoroastrian conflicts, given your current situation?: none  Food Allergies: NKFA  Factors Affecting Nutritional Intake: early satiety (inactivity )    Anthropometrics    Temp: 98.4 °F (36.9 °C)  Height Method: Stated  Height: 5' 3" (160 cm)  Height (inches): 63 in  Weight Method: Standard Scale  Weight: 71.7 kg (158 lb 1.1 oz)  Weight (lb): 158.07 lb  Ideal Body Weight (IBW), Female: 115 lb  % Ideal Body Weight, Female (lb): 137.45 lb  BMI (Calculated): 28.1  BMI Grade: 18.5-24.9 - normal       Lab/Procedures/Meds    Pertinent Labs Reviewed: reviewed  Lab Results   Component Value Date    WBC 0.37 (LL) 06/05/2018    HGB 7.5 (L) 06/05/2018    HCT 22.4 (L) 06/05/2018    PLT 14 (LL) 06/05/2018       (L) 06/05/2018    CO2 21 (L) 06/05/2018    CALCIUM 8.1 (L) 06/05/2018    ANIONGAP 6 (L) " 06/05/2018      CALCIUM 8.1 (L) 06/05/2018    PHOS 2.0 (L) 06/05/2018        MG                          1.9 (L)   ALT 7 (L) 06/05/2018    AST 9 (L) 06/05/2018      ALBUMIN 3.0 (L) 06/05/2018       Pertinent Medications Reviewed: reviewed  Scheduled Meds:   acyclovir  400 mg Oral BID    allopurinol  300 mg Oral Daily    ciprofloxacin HCl  500 mg Oral Q12H    cytarabine (CYTOSAR) chemo infusion  100 mg/m2/day (Treatment Plan Recorded) Intravenous Q24H    metoprolol succinate  25 mg Oral Daily    sodium chloride 0.9%  10 mL Intravenous Q6H    voriconazole  200 mg Oral BID     Continuous Infusions:  PRN Meds:. heparin, porcine (PF)    Physical Findings/Assessment    Overall Physical Appearance: nourished  Oral/Mouth Cavity: WDL  Skin: intact    Estimated/Assessed Needs    Weight Used For Calorie Calculations: 71.7 kg (158 lb 1.1 oz)  Energy Calorie Requirements (kcal): 2151-2510kcal/d  Energy Need Method: Kcal/kg (30-35kcal/kg)  Protein Requirements: 108-144g/d  Weight Used For Protein Calculations: 71.7 kg (158 lb 1.1 oz)  Fluid Requirements (mL): 1ml per KCal or Per MD  Fluid Need Method: RDA Method  RDA Method (mL): 2151  CHO Requirement: NA      Nutrition Prescription Ordered    Current Diet Order: Regular    Evaluation of Received Nutrient/Fluid Intake    IV Fluid (mL): 1000  I/O: -2.9l since admit  Energy Calories Required: not meeting needs  Protein Required: not meeting needs  Fluid Required: meeting needs  Comments: LBM: 6/4/18  % Intake of Estimated Energy Needs: 25 - 50 %  % Meal Intake: 25 - 50 %    Nutrition Risk    Level of Risk/Frequency of Follow-up: low     Assessment and Plan    * 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      Nutrition Problem  increased nutrient needs    Related to (etiology):   Physiological needs 2/2 AML     Signs and Symptoms (as evidenced by):   Pt receiving 7+3 regimen (day+8 increased Caloric/ Protein needs)    Interventions/Recommendations (treatment  strategy):  See recs above    Nutrition Diagnosis Status:   Continues               Monitor and Evaluation    Food and Nutrient Intake: energy intake, food and beverage intake  Food and Nutrient Administration: diet order  Physical Activity and Function: nutrition-related ADLs and IADLs  Anthropometric Measurements: weight, weight change  Biochemical Data, Medical Tests and Procedures:  (LBM: 5/31/18)  Nutrition-Focused Physical Findings: overall appearance     Nutrition Follow-Up    RD Follow-up?: Yes

## 2018-06-06 ENCOUNTER — TELEPHONE (OUTPATIENT)
Dept: PHARMACY | Facility: CLINIC | Age: 68
End: 2018-06-06

## 2018-06-06 LAB
ALBUMIN SERPL BCP-MCNC: 2.9 G/DL
ALP SERPL-CCNC: 143 U/L
ALT SERPL W/O P-5'-P-CCNC: 12 U/L
ANION GAP SERPL CALC-SCNC: 6 MMOL/L
ANNOTATION COMMENT IMP: NORMAL
AST SERPL-CCNC: 12 U/L
BASOPHILS # BLD AUTO: 0 K/UL
BASOPHILS NFR BLD: 0 %
BILIRUB SERPL-MCNC: 0.4 MG/DL
BUN SERPL-MCNC: 10 MG/DL
CALCIUM SERPL-MCNC: 7.8 MG/DL
CHLORIDE SERPL-SCNC: 110 MMOL/L
CO2 SERPL-SCNC: 21 MMOL/L
CREAT SERPL-MCNC: 0.7 MG/DL
DIFFERENTIAL METHOD: ABNORMAL
EOSINOPHIL # BLD AUTO: 0 K/UL
EOSINOPHIL NFR BLD: 0 %
ERYTHROCYTE [DISTWIDTH] IN BLOOD BY AUTOMATED COUNT: 14 %
EST. GFR  (AFRICAN AMERICAN): >60 ML/MIN/1.73 M^2
EST. GFR  (NON AFRICAN AMERICAN): >60 ML/MIN/1.73 M^2
GLUCOSE SERPL-MCNC: 113 MG/DL
HCT VFR BLD AUTO: 20.8 %
HGB BLD-MCNC: 7 G/DL
IMM GRANULOCYTES # BLD AUTO: 0 K/UL
IMM GRANULOCYTES NFR BLD AUTO: 0 %
LYMPHOCYTES # BLD AUTO: 0.2 K/UL
LYMPHOCYTES NFR BLD: 96 %
MAGNESIUM SERPL-MCNC: 1.5 MG/DL
MCH RBC QN AUTO: 32.4 PG
MCHC RBC AUTO-ENTMCNC: 33.7 G/DL
MCV RBC AUTO: 96 FL
MONOCYTES # BLD AUTO: 0 K/UL
MONOCYTES NFR BLD: 0 %
NEUTROPHILS # BLD AUTO: 0 K/UL
NEUTROPHILS NFR BLD: 4 %
NGS CLINCIAL TRIALS: NORMAL
NGS INDICATION OF TEST: NORMAL
NGS INTERPRETATION: NORMAL
NGS ONCOHEME PANEL GENE LIST: NORMAL
NGS PATHOGENIC MUTATIONS DETECTED: NORMAL
NGS REVIEWED BY:: NORMAL
NGS VARIANTS OF UNKNOWN SIGNIFICANCE: NORMAL
NRBC BLD-RTO: 0 /100 WBC
PHOSPHATE SERPL-MCNC: 2.2 MG/DL
PLATELET # BLD AUTO: 11 K/UL
PMV BLD AUTO: 10.3 FL
POTASSIUM SERPL-SCNC: 4.8 MMOL/L
PROT SERPL-MCNC: 6.2 G/DL
RBC # BLD AUTO: 2.16 M/UL
REF LAB TEST METHOD: NORMAL
SODIUM SERPL-SCNC: 137 MMOL/L
SPECIMEN SOURCE: NORMAL
TEST PERFORMANCE INFO SPEC: NORMAL
WBC # BLD AUTO: 0.25 K/UL

## 2018-06-06 PROCEDURE — 84100 ASSAY OF PHOSPHORUS: CPT

## 2018-06-06 PROCEDURE — 25000003 PHARM REV CODE 250: Performed by: INTERNAL MEDICINE

## 2018-06-06 PROCEDURE — 63600175 PHARM REV CODE 636 W HCPCS: Performed by: INTERNAL MEDICINE

## 2018-06-06 PROCEDURE — 80053 COMPREHEN METABOLIC PANEL: CPT

## 2018-06-06 PROCEDURE — 20600001 HC STEP DOWN PRIVATE ROOM

## 2018-06-06 PROCEDURE — 85025 COMPLETE CBC W/AUTO DIFF WBC: CPT

## 2018-06-06 PROCEDURE — 83735 ASSAY OF MAGNESIUM: CPT

## 2018-06-06 PROCEDURE — 99233 SBSQ HOSP IP/OBS HIGH 50: CPT | Mod: ,,, | Performed by: INTERNAL MEDICINE

## 2018-06-06 PROCEDURE — A4216 STERILE WATER/SALINE, 10 ML: HCPCS | Performed by: INTERNAL MEDICINE

## 2018-06-06 PROCEDURE — 63600175 PHARM REV CODE 636 W HCPCS: Performed by: HOSPITALIST

## 2018-06-06 PROCEDURE — 25000003 PHARM REV CODE 250: Performed by: STUDENT IN AN ORGANIZED HEALTH CARE EDUCATION/TRAINING PROGRAM

## 2018-06-06 RX ORDER — MAGNESIUM SULFATE HEPTAHYDRATE 40 MG/ML
2 INJECTION, SOLUTION INTRAVENOUS ONCE
Status: COMPLETED | OUTPATIENT
Start: 2018-06-06 | End: 2018-06-06

## 2018-06-06 RX ORDER — MORPHINE SULFATE 10 MG/ML
2 INJECTION INTRAMUSCULAR; INTRAVENOUS; SUBCUTANEOUS ONCE
Status: COMPLETED | OUTPATIENT
Start: 2018-06-06 | End: 2018-06-06

## 2018-06-06 RX ORDER — MORPHINE SULFATE 2 MG/ML
2 INJECTION, SOLUTION INTRAMUSCULAR; INTRAVENOUS EVERY 4 HOURS PRN
Status: DISCONTINUED | OUTPATIENT
Start: 2018-06-06 | End: 2018-06-07

## 2018-06-06 RX ADMIN — MORPHINE SULFATE 2 MG: 2 INJECTION, SOLUTION INTRAMUSCULAR; INTRAVENOUS at 08:06

## 2018-06-06 RX ADMIN — ACYCLOVIR 400 MG: 200 CAPSULE ORAL at 08:06

## 2018-06-06 RX ADMIN — MORPHINE SULFATE 2 MG: 2 INJECTION, SOLUTION INTRAMUSCULAR; INTRAVENOUS at 11:06

## 2018-06-06 RX ADMIN — Medication 10 ML: at 11:06

## 2018-06-06 RX ADMIN — VORICONAZOLE 200 MG: 200 TABLET, FILM COATED ORAL at 09:06

## 2018-06-06 RX ADMIN — CLOBETASOL PROPIONATE: 0.5 OINTMENT TOPICAL at 09:06

## 2018-06-06 RX ADMIN — MORPHINE SULFATE 2 MG: 10 INJECTION INTRAMUSCULAR; INTRAVENOUS; SUBCUTANEOUS at 09:06

## 2018-06-06 RX ADMIN — ACYCLOVIR 400 MG: 200 CAPSULE ORAL at 09:06

## 2018-06-06 RX ADMIN — FAMOTIDINE 20 MG: 20 TABLET ORAL at 09:06

## 2018-06-06 RX ADMIN — ONDANSETRON 4 MG: 2 INJECTION INTRAMUSCULAR; INTRAVENOUS at 09:06

## 2018-06-06 RX ADMIN — Medication 10 ML: at 05:06

## 2018-06-06 RX ADMIN — CIPROFLOXACIN HYDROCHLORIDE 500 MG: 500 TABLET, FILM COATED ORAL at 09:06

## 2018-06-06 RX ADMIN — ALLOPURINOL 300 MG: 300 TABLET ORAL at 09:06

## 2018-06-06 RX ADMIN — MAGNESIUM SULFATE IN WATER 2 G: 40 INJECTION, SOLUTION INTRAVENOUS at 10:06

## 2018-06-06 RX ADMIN — RAMELTEON 8 MG: 8 TABLET, FILM COATED ORAL at 09:06

## 2018-06-06 RX ADMIN — VORICONAZOLE 200 MG: 200 TABLET, FILM COATED ORAL at 08:06

## 2018-06-06 RX ADMIN — CLOBETASOL PROPIONATE: 0.5 OINTMENT TOPICAL at 08:06

## 2018-06-06 RX ADMIN — METOPROLOL SUCCINATE 25 MG: 25 TABLET, EXTENDED RELEASE ORAL at 09:06

## 2018-06-06 RX ADMIN — ALPRAZOLAM 0.25 MG: 0.25 TABLET ORAL at 09:06

## 2018-06-06 RX ADMIN — MORPHINE SULFATE 2 MG: 2 INJECTION, SOLUTION INTRAMUSCULAR; INTRAVENOUS at 06:06

## 2018-06-06 RX ADMIN — FAMOTIDINE 20 MG: 20 TABLET ORAL at 08:06

## 2018-06-06 RX ADMIN — CIPROFLOXACIN HYDROCHLORIDE 500 MG: 500 TABLET, FILM COATED ORAL at 08:06

## 2018-06-06 RX ADMIN — MORPHINE SULFATE 2 MG: 2 INJECTION, SOLUTION INTRAMUSCULAR; INTRAVENOUS at 04:06

## 2018-06-06 RX ADMIN — ESCITALOPRAM 10 MG: 5 TABLET, FILM COATED ORAL at 09:06

## 2018-06-06 NOTE — TELEPHONE ENCOUNTER
Delivered patients rydapt to nurse Ruma Mccullough on 8th floor [facility], welcome supplies delivered to patients room, two patient identifiers verified upon delivery.    Ruma Leong  [facility]  Phone: [phone]

## 2018-06-06 NOTE — ASSESSMENT & PLAN NOTE
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 Ordered routine labs in addition to FLT3, NGS, and AML FISH.  - lo res HLA typing on 5/26/18 and hi res on 5/27/18 done in anticipation of possible need for future transplant   - Pt with two daughters, two full sisters (in their mid 60s, one with brain aneurysm hx, other one without any medical issues), and one full brother (59 y/o healthy).  - ECHO ordered 5/25/18, EF 65%  - PICC line placed 5/25/18   - initially on hydrea, discontinued on 5/28.   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  - started on induction therapy on 5/29/18.  - Day 8 of induction therapy, tolerating treatment fairly well.    - On allopurinol 300 mg daily, risk for tumor lysis likely low at this point.   - prophylactic antimicrobials: Acyclovir, Cipro. Voriconazole to start tomorrow  - will need CNS Prophylaxis given her elevated WBC (high risk) on admission after count recovery.  - NPM1 +, CEBPA (-), FLT3 (+).    - generally tolerating treatment well aside from painful rash which she developed today  - plan to start Midostaurin 50 mg PO BID today and continue through Day 14  - will plan for empty bone marrow biopsy on Monday or Tuesday or next week

## 2018-06-06 NOTE — PLAN OF CARE
Problem: Patient Care Overview  Goal: Plan of Care Review  Outcome: Ongoing (interventions implemented as appropriate)  Side rails up x2; call bell in place; bed in lowest, locked position; skid proof socks on; no evidence of skin breakdown; care plan explained to patient; pt remains free of injury. Pt tolerated po, voids, ambulates. Pt with c/o vaginal mucositis,  morphine iv given x 2 and steriod cream, pt stated she had relief, peribottle and sterile water given to cleanse after urination. Pt instructed to call as needed. VSS and afebrile.

## 2018-06-06 NOTE — PLAN OF CARE
Problem: Patient Care Overview  Goal: Plan of Care Review  Outcome: Ongoing (interventions implemented as appropriate)  Pt afebrile, pt with vaginal mucositis. Morphine given x 1 for pain, steroid cream applied. Instructed patient to call for assistance, bed low and locked, call bell within reach, nonskid socks on, patient verbalized understanding.

## 2018-06-06 NOTE — SUBJECTIVE & OBJECTIVE
Subjective:     Interval History:   - Day 9 of induction chemotherapy  - doing generally well, however has developed painful labial rash felt to be mucositis    Objective:     Vital Signs (Most Recent):  Temp: 98.5 °F (36.9 °C) (06/06/18 1109)  Pulse: 70 (06/06/18 1109)  Resp: 18 (06/06/18 1109)  BP: (!) 115/55 (06/06/18 1109)  SpO2: 98 % (06/06/18 1109) Vital Signs (24h Range):  Temp:  [98 °F (36.7 °C)-99.1 °F (37.3 °C)] 98.5 °F (36.9 °C)  Pulse:  [66-77] 70  Resp:  [16-18] 18  SpO2:  [93 %-98 %] 98 %  BP: ()/(54-58) 115/55     Weight: 71.4 kg (157 lb 6.5 oz)  Body mass index is 27.88 kg/m².  Body surface area is 1.78 meters squared.    ECOG SCORE         [unfilled]    Intake/Output - Last 3 Shifts       06/04 0700 - 06/05 0659 06/05 0700 - 06/06 0659 06/06 0700 - 06/07 0659    P.O.  960     I.V. (mL/kg)  50 (0.7)     IV Piggyback 2126.6 1040.9     Total Intake(mL/kg) 2126.6 (29.7) 2050.9 (28.7)     Urine (mL/kg/hr) 1000 (0.6) 1700 (1)     Total Output 1000 1700      Net +1126.6 +350.9             Stool Occurrence  1 x           Physical Exam   Constitutional: She is oriented to person, place, and time. She appears well-developed and well-nourished. No distress.   HENT:   Head: Normocephalic and atraumatic.   Eyes: EOM are normal. Pupils are equal, round, and reactive to light.   Cardiovascular: Normal rate and regular rhythm.    No murmur heard.  Pulmonary/Chest: Effort normal and breath sounds normal. No respiratory distress.   Abdominal: Soft. Bowel sounds are normal. There is no tenderness.   Genitourinary:   Genitourinary Comments: + labial erythematous maculopapular rash extending into inguinal folds   Neurological: She is alert and oriented to person, place, and time.   Skin: Skin is warm and dry.   Vitals reviewed.      Significant Labs:   CBC:   Recent Labs  Lab 06/05/18  0410 06/06/18  0428   WBC 0.37* 0.25*   HGB 7.5* 7.0*   HCT 22.4* 20.8*   PLT 14* 11*    and CMP:   Recent Labs  Lab  06/05/18  0616 06/06/18  0428   * 137   K 3.8 4.8    110   CO2 21* 21*    113*   BUN 8 10   CREATININE 0.7 0.7   CALCIUM 8.1* 7.8*   PROT 6.6 6.2   ALBUMIN 3.0* 2.9*   BILITOT 0.6 0.4   ALKPHOS 100 143*   AST 9* 12   ALT 7* 12   ANIONGAP 6* 6*   EGFRNONAA >60.0 >60.0       Diagnostic Results:  I have reviewed all pertinent imaging results/findings within the past 24 hours.

## 2018-06-06 NOTE — PROGRESS NOTES
[facility]  Hematology  Bone Marrow Transplant  Progress Note    Patient Name: Noreen Mac  Admission Date: 5/24/2018  Hospital Length of Stay: 13 days  Code Status: Full Code    Subjective:     Interval History:   - Day 9 of induction chemotherapy  - doing generally well, however has developed painful labial rash felt to be mucositis    Objective:     Vital Signs (Most Recent):  Temp: 98.5 °F (36.9 °C) (06/06/18 1109)  Pulse: 70 (06/06/18 1109)  Resp: 18 (06/06/18 1109)  BP: (!) 115/55 (06/06/18 1109)  SpO2: 98 % (06/06/18 1109) Vital Signs (24h Range):  Temp:  [98 °F (36.7 °C)-99.1 °F (37.3 °C)] 98.5 °F (36.9 °C)  Pulse:  [66-77] 70  Resp:  [16-18] 18  SpO2:  [93 %-98 %] 98 %  BP: ()/(54-58) 115/55     Weight: 71.4 kg (157 lb 6.5 oz)  Body mass index is 27.88 kg/m².  Body surface area is 1.78 meters squared.    ECOG SCORE         [unfilled]    Intake/Output - Last 3 Shifts       06/04 0700 - 06/05 0659 06/05 0700 - 06/06 0659 06/06 0700 - 06/07 0659    P.O.  960     I.V. (mL/kg)  50 (0.7)     IV Piggyback 2126.6 1040.9     Total Intake(mL/kg) 2126.6 (29.7) 2050.9 (28.7)     Urine (mL/kg/hr) 1000 (0.6) 1700 (1)     Total Output 1000 1700      Net +1126.6 +350.9             Stool Occurrence  1 x           Physical Exam   Constitutional: She is oriented to person, place, and time. She appears well-developed and well-nourished. No distress.   HENT:   Head: Normocephalic and atraumatic.   Eyes: EOM are normal. Pupils are equal, round, and reactive to light.   Cardiovascular: Normal rate and regular rhythm.    No murmur heard.  Pulmonary/Chest: Effort normal and breath sounds normal. No respiratory distress.   Abdominal: Soft. Bowel sounds are normal. There is no tenderness.   Genitourinary:   Genitourinary Comments: + labial erythematous maculopapular rash extending into inguinal folds   Neurological: She is alert and oriented to person, place, and time.   Skin: Skin is warm and dry.    Vitals reviewed.      Significant Labs:   CBC:   Recent Labs  Lab 06/05/18  0410 06/06/18  0428   WBC 0.37* 0.25*   HGB 7.5* 7.0*   HCT 22.4* 20.8*   PLT 14* 11*    and CMP:   Recent Labs  Lab 06/05/18  0616 06/06/18  0428   * 137   K 3.8 4.8    110   CO2 21* 21*    113*   BUN 8 10   CREATININE 0.7 0.7   CALCIUM 8.1* 7.8*   PROT 6.6 6.2   ALBUMIN 3.0* 2.9*   BILITOT 0.6 0.4   ALKPHOS 100 143*   AST 9* 12   ALT 7* 12   ANIONGAP 6* 6*   EGFRNONAA >60.0 >60.0       Diagnostic Results:  I have reviewed all pertinent imaging results/findings within the past 24 hours.    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 Ordered routine labs in addition to FLT3, NGS, and AML FISH.  - lo res HLA typing on 5/26/18 and hi res on 5/27/18 done in anticipation of possible need for future transplant   - Pt with two daughters, two full sisters (in their mid 60s, one with brain aneurysm hx, other one without any medical issues), and one full brother (59 y/o healthy).  - ECHO ordered 5/25/18, EF 65%  - PICC line placed 5/25/18   - initially on hydrea, discontinued on 5/28.   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  - started on induction therapy on 5/29/18.  - Day 8 of induction therapy, tolerating treatment fairly well.    - On allopurinol 300 mg daily, risk for tumor lysis likely low at this point.   - prophylactic antimicrobials: Acyclovir, Cipro. Voriconazole to start tomorrow  - will need CNS Prophylaxis given her elevated WBC (high risk) on admission after count recovery.  - NPM1 +, CEBPA (-), FLT3 (+).    - generally tolerating treatment well aside from painful rash which she developed today  - plan to start Midostaurin 50 mg PO BID today and continue through Day  14  - will plan for empty bone marrow biopsy on Monday or Tuesday or next week            Pancytopenia    - secondary to AML and chemotherapy.   - wbc: 320/mm3 with ANC: 100/mm3, Hb: 7.8 g/dL, and Plt: 13,000/mm3.   - responded appropriately to transfusion yesterday  - tranfuse if Hb < 7 g/dL and Plt < 10,000/mm3 or active bleed.           MCTD (mixed connective tissue disease)    - MCTD-NOS  - previously on MTX and Prednisone.   - off therapy now and otherwise doing well.   - can follow up outpatient with her Rheumatologist upon discharge.         Alteration in electrolyte and fluid balance    - Continues to drink plenty of water  - monitor daily electrolytes        Bilateral headaches    - complained of bilateral temporal headaches earlier in hospitalization course  - similar to ones on presentation at OSH  - CT Head done at OSH was unremarkable.   - will consider re-imaging if deemed necessary.   - no complaints of headache today  - PRN Tylenol for now.           Neutropenic fever    - issue resolved now.   - spiked temp on 5/29 of 102, afebrile since then.   - blood cultures no growth, CXR and UA unremarkable. Was on cefepime for 2 days.           Pulmonary nodule    - CXR suggestive of 7 mm slightly irregular shaped radiodensity projected over the left upper lung zone which could represent a calcified granuloma but remains indeterminate.  - CT Chest on 5/28: Multiple scattered noncalcified pulmonary granulomas, the largest measuring 8 mm in the lateral segment of the right middle lobe.   - repeat non-contrast chest CT at 6-12 months         Insomnia    - PRN nightly Xanax.         Rash of genital area    She developed painful erythematous maculopapular rash overnight. Unclear etiology at this time.  - morphine 2 mg IV has seemed to relieve pain  - will consult Derm for further recommendations.  - continue to monitor        HTN (hypertension)    - history of SVT and reports intermittent palpitations at times.  -  EKG on 5/28 - NSR.  - increased metoprolol to 25 mg QD,she feels better with this dose.        Electrolyte abnormality    - will replace electrolytes as needed.  - Mg Sulfate 2 gm IV for Mg of 1.4 this AM          GERD (gastroesophageal reflux disease)    - takes Omeprazole at home, now on pepcid here  - no issue         Osteoporosis    - takes prolia once every 6 months, last given mid April 2018   - no acute issue         Depression    - no acute issue   - continue home Escitalopram             VTE Risk Mitigation         Ordered     heparin, porcine (PF) 100 unit/mL injection flush 300 Units  As needed (PRN)      05/29/18 1024     Place MAYLIN hose  Until discontinued      05/25/18 0032     IP VTE HIGH RISK PATIENT  Once      05/25/18 0032          Disposition: continue pain control for vaginal mucositis. Daily monitoring of counts and will plan for day 14 biopsy early next week. Will need Intrathecal chemotherapy.    Simon Luong MD  Bone Marrow Transplant  [facility]

## 2018-06-07 LAB
ABO + RH BLD: NORMAL
ALBUMIN SERPL BCP-MCNC: 3.1 G/DL
ALP SERPL-CCNC: 304 U/L
ALT SERPL W/O P-5'-P-CCNC: 35 U/L
ANION GAP SERPL CALC-SCNC: 6 MMOL/L
ANISOCYTOSIS BLD QL SMEAR: SLIGHT
AST SERPL-CCNC: 82 U/L
BASOPHILS # BLD AUTO: 0 K/UL
BASOPHILS NFR BLD: 0 %
BILIRUB SERPL-MCNC: 0.5 MG/DL
BLD GP AB SCN CELLS X3 SERPL QL: NORMAL
BLD PROD TYP BPU: NORMAL
BLD PROD TYP BPU: NORMAL
BLOOD UNIT EXPIRATION DATE: NORMAL
BLOOD UNIT EXPIRATION DATE: NORMAL
BLOOD UNIT TYPE CODE: 6200
BLOOD UNIT TYPE CODE: 6200
BLOOD UNIT TYPE: NORMAL
BLOOD UNIT TYPE: NORMAL
BUN SERPL-MCNC: 14 MG/DL
CALCIUM SERPL-MCNC: 8.2 MG/DL
CHLORIDE SERPL-SCNC: 110 MMOL/L
CO2 SERPL-SCNC: 20 MMOL/L
CODING SYSTEM: NORMAL
CODING SYSTEM: NORMAL
CREAT SERPL-MCNC: 0.7 MG/DL
DIFFERENTIAL METHOD: ABNORMAL
DISPENSE STATUS: NORMAL
DISPENSE STATUS: NORMAL
EOSINOPHIL # BLD AUTO: 0 K/UL
EOSINOPHIL NFR BLD: 0 %
ERYTHROCYTE [DISTWIDTH] IN BLOOD BY AUTOMATED COUNT: 13.7 %
EST. GFR  (AFRICAN AMERICAN): >60 ML/MIN/1.73 M^2
EST. GFR  (NON AFRICAN AMERICAN): >60 ML/MIN/1.73 M^2
GLUCOSE SERPL-MCNC: 117 MG/DL
HCT VFR BLD AUTO: 19.7 %
HGB BLD-MCNC: 6.6 G/DL
HYPOCHROMIA BLD QL SMEAR: ABNORMAL
IMM GRANULOCYTES # BLD AUTO: 0 K/UL
IMM GRANULOCYTES NFR BLD AUTO: 0 %
LYMPHOCYTES # BLD AUTO: 0.2 K/UL
LYMPHOCYTES NFR BLD: 100 %
MAGNESIUM SERPL-MCNC: 1.9 MG/DL
MCH RBC QN AUTO: 32 PG
MCHC RBC AUTO-ENTMCNC: 33.5 G/DL
MCV RBC AUTO: 96 FL
MONOCYTES # BLD AUTO: 0 K/UL
MONOCYTES NFR BLD: 0 %
NEUTROPHILS # BLD AUTO: 0 K/UL
NEUTROPHILS NFR BLD: 0 %
NRBC BLD-RTO: 0 /100 WBC
NUM UNITS TRANS PACKED RBC: NORMAL
NUM UNITS TRANS WBC-POOR PLATPHERESIS: NORMAL
OVALOCYTES BLD QL SMEAR: ABNORMAL
PHOSPHATE SERPL-MCNC: 2.1 MG/DL
PLATELET # BLD AUTO: 4 K/UL
PMV BLD AUTO: 12.1 FL
POIKILOCYTOSIS BLD QL SMEAR: SLIGHT
POLYCHROMASIA BLD QL SMEAR: ABNORMAL
POTASSIUM SERPL-SCNC: 4.9 MMOL/L
PROT SERPL-MCNC: 6.5 G/DL
RBC # BLD AUTO: 2.06 M/UL
SODIUM SERPL-SCNC: 136 MMOL/L
URATE SERPL-MCNC: 1.9 MG/DL
WBC # BLD AUTO: 0.2 K/UL

## 2018-06-07 PROCEDURE — 25000003 PHARM REV CODE 250: Performed by: INTERNAL MEDICINE

## 2018-06-07 PROCEDURE — 36415 COLL VENOUS BLD VENIPUNCTURE: CPT

## 2018-06-07 PROCEDURE — 85025 COMPLETE CBC W/AUTO DIFF WBC: CPT

## 2018-06-07 PROCEDURE — 80053 COMPREHEN METABOLIC PANEL: CPT

## 2018-06-07 PROCEDURE — P9037 PLATE PHERES LEUKOREDU IRRAD: HCPCS

## 2018-06-07 PROCEDURE — P9040 RBC LEUKOREDUCED IRRADIATED: HCPCS

## 2018-06-07 PROCEDURE — 20600001 HC STEP DOWN PRIVATE ROOM

## 2018-06-07 PROCEDURE — 86920 COMPATIBILITY TEST SPIN: CPT

## 2018-06-07 PROCEDURE — 25000003 PHARM REV CODE 250: Performed by: HOSPITALIST

## 2018-06-07 PROCEDURE — 25000003 PHARM REV CODE 250: Performed by: STUDENT IN AN ORGANIZED HEALTH CARE EDUCATION/TRAINING PROGRAM

## 2018-06-07 PROCEDURE — 86850 RBC ANTIBODY SCREEN: CPT

## 2018-06-07 PROCEDURE — 84550 ASSAY OF BLOOD/URIC ACID: CPT

## 2018-06-07 PROCEDURE — 63600175 PHARM REV CODE 636 W HCPCS: Performed by: INTERNAL MEDICINE

## 2018-06-07 PROCEDURE — 99233 SBSQ HOSP IP/OBS HIGH 50: CPT | Mod: ,,, | Performed by: INTERNAL MEDICINE

## 2018-06-07 PROCEDURE — 36430 TRANSFUSION BLD/BLD COMPNT: CPT

## 2018-06-07 PROCEDURE — A4216 STERILE WATER/SALINE, 10 ML: HCPCS | Performed by: INTERNAL MEDICINE

## 2018-06-07 PROCEDURE — 83735 ASSAY OF MAGNESIUM: CPT

## 2018-06-07 PROCEDURE — 84100 ASSAY OF PHOSPHORUS: CPT

## 2018-06-07 RX ORDER — DIPHENHYDRAMINE HCL 25 MG
25 CAPSULE ORAL EVERY 6 HOURS PRN
Status: DISCONTINUED | OUTPATIENT
Start: 2018-06-07 | End: 2018-06-07

## 2018-06-07 RX ORDER — SODIUM,POTASSIUM PHOSPHATES 280-250MG
2 POWDER IN PACKET (EA) ORAL ONCE
Status: COMPLETED | OUTPATIENT
Start: 2018-06-07 | End: 2018-06-07

## 2018-06-07 RX ORDER — ACYCLOVIR 200 MG/1
400 CAPSULE ORAL 2 TIMES DAILY
Status: CANCELLED | OUTPATIENT
Start: 2018-06-07

## 2018-06-07 RX ORDER — HYDROCODONE BITARTRATE AND ACETAMINOPHEN 500; 5 MG/1; MG/1
TABLET ORAL
Status: DISCONTINUED | OUTPATIENT
Start: 2018-06-07 | End: 2018-06-08

## 2018-06-07 RX ORDER — VALACYCLOVIR HYDROCHLORIDE 500 MG/1
1000 TABLET, FILM COATED ORAL 3 TIMES DAILY
Status: DISCONTINUED | OUTPATIENT
Start: 2018-06-07 | End: 2018-06-07

## 2018-06-07 RX ORDER — ACYCLOVIR 200 MG/1
400 CAPSULE ORAL 2 TIMES DAILY
Status: DISCONTINUED | OUTPATIENT
Start: 2018-06-07 | End: 2018-06-20

## 2018-06-07 RX ORDER — DIPHENHYDRAMINE HCL 25 MG
25 CAPSULE ORAL ONCE
Status: COMPLETED | OUTPATIENT
Start: 2018-06-07 | End: 2018-06-07

## 2018-06-07 RX ORDER — MORPHINE SULFATE 2 MG/ML
2 INJECTION, SOLUTION INTRAMUSCULAR; INTRAVENOUS
Status: DISCONTINUED | OUTPATIENT
Start: 2018-06-07 | End: 2018-07-13 | Stop reason: HOSPADM

## 2018-06-07 RX ORDER — MORPHINE SULFATE 15 MG/1
15 TABLET ORAL EVERY 4 HOURS PRN
Status: DISCONTINUED | OUTPATIENT
Start: 2018-06-07 | End: 2018-07-13 | Stop reason: HOSPADM

## 2018-06-07 RX ADMIN — Medication 10 ML: at 12:06

## 2018-06-07 RX ADMIN — METOPROLOL SUCCINATE 25 MG: 25 TABLET, EXTENDED RELEASE ORAL at 09:06

## 2018-06-07 RX ADMIN — ALLOPURINOL 300 MG: 300 TABLET ORAL at 09:06

## 2018-06-07 RX ADMIN — FAMOTIDINE 20 MG: 20 TABLET ORAL at 09:06

## 2018-06-07 RX ADMIN — MORPHINE SULFATE 2 MG: 2 INJECTION, SOLUTION INTRAMUSCULAR; INTRAVENOUS at 04:06

## 2018-06-07 RX ADMIN — ACETAMINOPHEN 650 MG: 325 TABLET, FILM COATED ORAL at 09:06

## 2018-06-07 RX ADMIN — POTASSIUM & SODIUM PHOSPHATES POWDER PACK 280-160-250 MG 2 PACKET: 280-160-250 PACK at 01:06

## 2018-06-07 RX ADMIN — ACYCLOVIR 400 MG: 200 CAPSULE ORAL at 09:06

## 2018-06-07 RX ADMIN — DIPHENHYDRAMINE HYDROCHLORIDE 12.5 MG: 50 INJECTION, SOLUTION INTRAMUSCULAR; INTRAVENOUS at 09:06

## 2018-06-07 RX ADMIN — MORPHINE SULFATE 2 MG: 2 INJECTION, SOLUTION INTRAMUSCULAR; INTRAVENOUS at 12:06

## 2018-06-07 RX ADMIN — VORICONAZOLE 200 MG: 200 TABLET, FILM COATED ORAL at 09:06

## 2018-06-07 RX ADMIN — CIPROFLOXACIN HYDROCHLORIDE 500 MG: 500 TABLET, FILM COATED ORAL at 09:06

## 2018-06-07 RX ADMIN — ESCITALOPRAM 10 MG: 5 TABLET, FILM COATED ORAL at 09:06

## 2018-06-07 RX ADMIN — Medication 10 ML: at 04:06

## 2018-06-07 RX ADMIN — MORPHINE SULFATE 15 MG: 15 TABLET ORAL at 10:06

## 2018-06-07 RX ADMIN — Medication 10 ML: at 06:06

## 2018-06-07 RX ADMIN — MORPHINE SULFATE 15 MG: 15 TABLET ORAL at 04:06

## 2018-06-07 RX ADMIN — DIPHENHYDRAMINE HYDROCHLORIDE 25 MG: 25 CAPSULE ORAL at 09:06

## 2018-06-07 NOTE — ASSESSMENT & PLAN NOTE
- Admitted from [facility] on 5/25/18 early AM with WBC around 100s, for 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 Ordered routine labs in addition to FLT3, NGS, and AML FISH.  - lo res HLA typing on 5/26/18 and hi res on 5/27/18 done in anticipation of possible need for future transplant   - Pt with two daughters, two full sisters (in their mid 60s, one with brain aneurysm hx, other one without any medical issues), and one full brother (59 y/o healthy).  - ECHO ordered 5/25/18, EF 65%  - PICC line placed 5/25/18   - initially on hydrea, discontinued on 5/28.   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  - started on induction therapy on 5/29/18.  - Day 10 of induction therapy, tolerating treatment fairly well.    - On allopurinol 300 mg daily, risk for tumor lysis likely low at this point.   - prophylactic antimicrobials: Acyclovir, Cipro, Voriconazole  - will need CNS Prophylaxis given her elevated WBC (high risk) on admission after count recovery.  - NPM1 +, CEBPA (-), FLT3 (+).  On Midostaurin 50 mg PO BID until Day 14.   - generally tolerating treatment well aside from painful rash which she developed today  - will plan for empty bone marrow biopsy on Monday or Tuesday of next week  - will get 1 unit PRBC's and platelets today.

## 2018-06-07 NOTE — PROGRESS NOTES
[facility]  Hematology  Bone Marrow Transplant  Progress Note    Patient Name: Noreen Mac  Admission Date: 5/24/2018  Hospital Length of Stay: 14 days  Code Status: Full Code    Subjective:     Interval History:   - Day 10 of induction chemotherapy  - have a great deal of pain around labial mucositis.    Objective:     Vital Signs (Most Recent):  Temp: 98.9 °F (37.2 °C) (06/07/18 1619)  Pulse: 74 (06/07/18 1619)  Resp: 18 (06/07/18 1619)  BP: (!) 123/58 (06/07/18 1619)  SpO2: 95 % (06/07/18 1619) Vital Signs (24h Range):  Temp:  [97.8 °F (36.6 °C)-99.1 °F (37.3 °C)] 98.9 °F (37.2 °C)  Pulse:  [60-77] 74  Resp:  [16-18] 18  SpO2:  [95 %-98 %] 95 %  BP: (121-153)/(58-67) 123/58     Weight: 70.8 kg (156 lb 3.1 oz)  Body mass index is 27.67 kg/m².  Body surface area is 1.77 meters squared.    ECOG SCORE         [unfilled]    Intake/Output - Last 3 Shifts       06/05 0700 - 06/06 0659 06/06 0700 - 06/07 0659 06/07 0700 - 06/08 0659    P.O. 960 1960 360    I.V. (mL/kg) 50 (0.7)      Blood  238     IV Piggyback 1040.9      Total Intake(mL/kg) 2050.9 (28.7) 2198 (31) 360 (5.1)    Urine (mL/kg/hr) 1700 (1) 2750 (1.6) 1000 (1.4)    Total Output 1700 2750 1000    Net +350.9 -552 -640           Stool Occurrence 1 x 0 x 1 x          Physical Exam   Constitutional: She is oriented to person, place, and time. She appears well-developed and well-nourished. No distress.   HENT:   Head: Normocephalic and atraumatic.   Eyes: Pupils are equal, round, and reactive to light.   Neck: Normal range of motion.   Cardiovascular: Normal rate and regular rhythm.    No murmur heard.  Pulmonary/Chest: Effort normal and breath sounds normal. No respiratory distress.   Abdominal: Soft. Bowel sounds are normal. There is no tenderness.   Genitourinary:   Genitourinary Comments: + labial erythematous, maculopapular rash.    Musculoskeletal: Normal range of motion. She exhibits no edema.   Neurological: She is alert and oriented  to person, place, and time.   Skin: Skin is warm and dry.   Vitals reviewed.      Significant Labs:   CBC:   Recent Labs  Lab 06/06/18  0428 06/07/18  0413   WBC 0.25* 0.20*   HGB 7.0* 6.6*   HCT 20.8* 19.7*   PLT 11* 4*    and CMP:   Recent Labs  Lab 06/06/18  0428 06/07/18  0413    136   K 4.8 4.9    110   CO2 21* 20*   * 117*   BUN 10 14   CREATININE 0.7 0.7   CALCIUM 7.8* 8.2*   PROT 6.2 6.5   ALBUMIN 2.9* 3.1*   BILITOT 0.4 0.5   ALKPHOS 143* 304*   AST 12 82*   ALT 12 35   ANIONGAP 6* 6*   EGFRNONAA >60.0 >60.0       Diagnostic Results:  I have reviewed all pertinent imaging results/findings within the past 24 hours.    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 Ordered routine labs in addition to FLT3, NGS, and AML FISH.  - lo res HLA typing on 5/26/18 and hi res on 5/27/18 done in anticipation of possible need for future transplant   - Pt with two daughters, two full sisters (in their mid 60s, one with brain aneurysm hx, other one without any medical issues), and one full brother (59 y/o healthy).  - ECHO ordered 5/25/18, EF 65%  - PICC line placed 5/25/18   - initially on hydrea, discontinued on 5/28.   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  - started on induction therapy on 5/29/18.  - Day 10 of induction therapy, tolerating treatment fairly well.    - On allopurinol 300 mg daily, risk for tumor lysis likely low at this point.   - prophylactic antimicrobials: Acyclovir, Cipro, Voriconazole  - will need CNS Prophylaxis given her elevated WBC (high risk) on admission after count recovery.  - NPM1 +, CEBPA (-), FLT3 (+).  On Midostaurin 50 mg PO BID until Day 14.   - generally tolerating treatment well aside from painful rash which she  developed today  - will plan for empty bone marrow biopsy on Monday or Tuesday of next week  - will get 1 unit PRBC's and platelets today.            Pancytopenia    - secondary to AML and chemotherapy.   - wbc: 320/mm3 with ANC: 100/mm3, Hb: 7.8 g/dL, and Plt: 13,000/mm3.   - responded appropriately to transfusion yesterday  - tranfuse if Hb < 7 g/dL and Plt < 10,000/mm3 or active bleed.           MCTD (mixed connective tissue disease)    - MCTD-NOS  - previously on MTX and Prednisone.   - off therapy now and otherwise doing well.   - can follow up outpatient with her Rheumatologist upon discharge.         Alteration in electrolyte and fluid balance    - Continues to drink plenty of water  - monitor daily electrolytes        Bilateral headaches    - complained of bilateral temporal headaches earlier in hospitalization course  - similar to ones on presentation at OSH  - CT Head done at OSH was unremarkable.   - will consider re-imaging if deemed necessary.   - no complaints of headache today  - PRN Tylenol for now.           Neutropenic fever    - issue resolved now.   - spiked temp on 5/29 of 102, afebrile since then.   - blood cultures no growth, CXR and UA unremarkable. Was on cefepime for 2 days.           Pulmonary nodule    - CXR suggestive of 7 mm slightly irregular shaped radiodensity projected over the left upper lung zone which could represent a calcified granuloma but remains indeterminate.  - CT Chest on 5/28: Multiple scattered noncalcified pulmonary granulomas, the largest measuring 8 mm in the lateral segment of the right middle lobe.   - repeat non-contrast chest CT at 6-12 months         Insomnia    - PRN nightly Xanax.         Rash of genital area    Rash appears smaller in area, but is more painful. She reports that Clabetasol cream makes irritation worse  - will discontinue clabetasol  - increase frequency of morphine to 2 mg IV Q3 hrs        HTN (hypertension)    - history of SVT and reports  intermittent palpitations at times.  - EKG on 5/28 - NSR.  - increased metoprolol to 25 mg QD,she feels better with this dose.        Electrolyte abnormality    - will replace electrolytes as needed.  - Mg Sulfate 2 gm IV for Mg of 1.4 this AM          GERD (gastroesophageal reflux disease)    - takes Omeprazole at home, now on pepcid here  - no issue         Osteoporosis    - takes prolia once every 6 months, last given mid April 2018   - no acute issue         Depression    - no acute issue   - continue home Escitalopram             VTE Risk Mitigation         Ordered     heparin, porcine (PF) 100 unit/mL injection flush 300 Units  As needed (PRN)      05/29/18 1024     Place MAYLIN hose  Until discontinued      05/25/18 0032     IP VTE HIGH RISK PATIENT  Once      05/25/18 0032          Disposition: on Day 10 of induction. Awaiting count decline and will plan for biopsy early next week. Will need IT Chemo as well.    Simon Luong MD  Bone Marrow Transplant  [facility]

## 2018-06-07 NOTE — SUBJECTIVE & OBJECTIVE
Subjective:     Interval History:   - Day 10 of induction chemotherapy  - have a great deal of pain around labial mucositis.    Objective:     Vital Signs (Most Recent):  Temp: 98.9 °F (37.2 °C) (06/07/18 1619)  Pulse: 74 (06/07/18 1619)  Resp: 18 (06/07/18 1619)  BP: (!) 123/58 (06/07/18 1619)  SpO2: 95 % (06/07/18 1619) Vital Signs (24h Range):  Temp:  [97.8 °F (36.6 °C)-99.1 °F (37.3 °C)] 98.9 °F (37.2 °C)  Pulse:  [60-77] 74  Resp:  [16-18] 18  SpO2:  [95 %-98 %] 95 %  BP: (121-153)/(58-67) 123/58     Weight: 70.8 kg (156 lb 3.1 oz)  Body mass index is 27.67 kg/m².  Body surface area is 1.77 meters squared.    ECOG SCORE         [unfilled]    Intake/Output - Last 3 Shifts       06/05 0700 - 06/06 0659 06/06 0700 - 06/07 0659 06/07 0700 - 06/08 0659    P.O. 960 1960 360    I.V. (mL/kg) 50 (0.7)      Blood  238     IV Piggyback 1040.9      Total Intake(mL/kg) 2050.9 (28.7) 2198 (31) 360 (5.1)    Urine (mL/kg/hr) 1700 (1) 2750 (1.6) 1000 (1.4)    Total Output 1700 2750 1000    Net +350.9 -552 -640           Stool Occurrence 1 x 0 x 1 x          Physical Exam   Constitutional: She is oriented to person, place, and time. She appears well-developed and well-nourished. No distress.   HENT:   Head: Normocephalic and atraumatic.   Eyes: Pupils are equal, round, and reactive to light.   Neck: Normal range of motion.   Cardiovascular: Normal rate and regular rhythm.    No murmur heard.  Pulmonary/Chest: Effort normal and breath sounds normal. No respiratory distress.   Abdominal: Soft. Bowel sounds are normal. There is no tenderness.   Genitourinary:   Genitourinary Comments: + labial erythematous, maculopapular rash.    Musculoskeletal: Normal range of motion. She exhibits no edema.   Neurological: She is alert and oriented to person, place, and time.   Skin: Skin is warm and dry.   Vitals reviewed.      Significant Labs:   CBC:   Recent Labs  Lab 06/06/18  0428 06/07/18  0413   WBC 0.25* 0.20*   HGB 7.0* 6.6*   HCT 20.8*  19.7*   PLT 11* 4*    and CMP:   Recent Labs  Lab 06/06/18  0428 06/07/18  0413    136   K 4.8 4.9    110   CO2 21* 20*   * 117*   BUN 10 14   CREATININE 0.7 0.7   CALCIUM 7.8* 8.2*   PROT 6.2 6.5   ALBUMIN 2.9* 3.1*   BILITOT 0.4 0.5   ALKPHOS 143* 304*   AST 12 82*   ALT 12 35   ANIONGAP 6* 6*   EGFRNONAA >60.0 >60.0       Diagnostic Results:  I have reviewed all pertinent imaging results/findings within the past 24 hours.

## 2018-06-07 NOTE — ASSESSMENT & PLAN NOTE
Rash appears smaller in area, but is more painful. She reports that Clabetasol cream makes irritation worse  - will discontinue clabetasol  - increase frequency of morphine to 2 mg IV Q3 hrs

## 2018-06-07 NOTE — PLAN OF CARE
Problem: Patient Care Overview  Goal: Plan of Care Review  Outcome: Ongoing (interventions implemented as appropriate)  Patient reports pain to the vaginal/rectal area. Small like vesicles noted with blister like appearance noted. Patient reports burning, sharp stabbing pain. Patient states the peribottle with warm water helps. States the pain comes and goes. Patient denies any other discomfort. Patient tolerating diet. Reported episode of nausea with increased pain. Afebrile, no falls this shift. Will cont to monitor.

## 2018-06-07 NOTE — PLAN OF CARE
Problem: Patient Care Overview  Goal: Plan of Care Review  Outcome: Ongoing (interventions implemented as appropriate)  Side rails up x2; call bell in place; bed in lowest, locked position; skid proof socks on; no evidence of skin breakdown; care plan explained to patient; pt remains free of injury. Pt tolerated po, voids, ambulates in room. Pt with one unit PRBC completed. Pt received morphine IR po x 1 pt stated her pain was relieved and napped most of afternoon. VSS and afebrile.

## 2018-06-08 PROBLEM — E87.8 ALTERATION IN ELECTROLYTE AND FLUID BALANCE: Status: RESOLVED | Noted: 2018-05-29 | Resolved: 2018-06-08

## 2018-06-08 PROBLEM — E83.42 HYPOMAGNESEMIA: Status: ACTIVE | Noted: 2018-06-08

## 2018-06-08 PROBLEM — R74.8 ABNORMAL LIVER ENZYMES: Status: ACTIVE | Noted: 2018-06-08

## 2018-06-08 PROBLEM — E83.39 HYPOPHOSPHATEMIA: Status: ACTIVE | Noted: 2018-06-08

## 2018-06-08 PROBLEM — R21 RASH OF GENITAL AREA: Status: RESOLVED | Noted: 2018-05-25 | Resolved: 2018-06-08

## 2018-06-08 LAB
ALBUMIN SERPL BCP-MCNC: 3 G/DL
ALP SERPL-CCNC: 616 U/L
ALT SERPL W/O P-5'-P-CCNC: 299 U/L
ANION GAP SERPL CALC-SCNC: 8 MMOL/L
AST SERPL-CCNC: 470 U/L
BASOPHILS # BLD AUTO: 0 K/UL
BASOPHILS NFR BLD: 0 %
BILIRUB SERPL-MCNC: 1.9 MG/DL
BUN SERPL-MCNC: 18 MG/DL
CALCIUM SERPL-MCNC: 7.8 MG/DL
CHLORIDE SERPL-SCNC: 110 MMOL/L
CO2 SERPL-SCNC: 19 MMOL/L
CREAT SERPL-MCNC: 0.7 MG/DL
DIFFERENTIAL METHOD: ABNORMAL
EOSINOPHIL # BLD AUTO: 0 K/UL
EOSINOPHIL NFR BLD: 0 %
ERYTHROCYTE [DISTWIDTH] IN BLOOD BY AUTOMATED COUNT: 16.4 %
EST. GFR  (AFRICAN AMERICAN): >60 ML/MIN/1.73 M^2
EST. GFR  (NON AFRICAN AMERICAN): >60 ML/MIN/1.73 M^2
GLUCOSE SERPL-MCNC: 96 MG/DL
HCT VFR BLD AUTO: 21.7 %
HGB BLD-MCNC: 7.2 G/DL
IMM GRANULOCYTES # BLD AUTO: 0 K/UL
IMM GRANULOCYTES NFR BLD AUTO: 0 %
LYMPHOCYTES # BLD AUTO: 0.2 K/UL
LYMPHOCYTES NFR BLD: 96 %
MAGNESIUM SERPL-MCNC: 1.5 MG/DL
MCH RBC QN AUTO: 30.8 PG
MCHC RBC AUTO-ENTMCNC: 33.2 G/DL
MCV RBC AUTO: 93 FL
MONOCYTES # BLD AUTO: 0 K/UL
MONOCYTES NFR BLD: 0 %
NEUTROPHILS # BLD AUTO: 0 K/UL
NEUTROPHILS NFR BLD: 4 %
NRBC BLD-RTO: 0 /100 WBC
PHOSPHATE SERPL-MCNC: 1.7 MG/DL
PLATELET # BLD AUTO: 40 K/UL
PMV BLD AUTO: 10.1 FL
POTASSIUM SERPL-SCNC: 5.2 MMOL/L
PROT SERPL-MCNC: 6.5 G/DL
RBC # BLD AUTO: 2.34 M/UL
SODIUM SERPL-SCNC: 137 MMOL/L
WBC # BLD AUTO: 0.25 K/UL

## 2018-06-08 PROCEDURE — 99233 SBSQ HOSP IP/OBS HIGH 50: CPT | Mod: ,,, | Performed by: INTERNAL MEDICINE

## 2018-06-08 PROCEDURE — A4216 STERILE WATER/SALINE, 10 ML: HCPCS | Performed by: INTERNAL MEDICINE

## 2018-06-08 PROCEDURE — 25000003 PHARM REV CODE 250: Performed by: INTERNAL MEDICINE

## 2018-06-08 PROCEDURE — 84100 ASSAY OF PHOSPHORUS: CPT

## 2018-06-08 PROCEDURE — 85025 COMPLETE CBC W/AUTO DIFF WBC: CPT

## 2018-06-08 PROCEDURE — 63600175 PHARM REV CODE 636 W HCPCS: Performed by: HOSPITALIST

## 2018-06-08 PROCEDURE — 25000003 PHARM REV CODE 250: Performed by: NURSE PRACTITIONER

## 2018-06-08 PROCEDURE — 80053 COMPREHEN METABOLIC PANEL: CPT

## 2018-06-08 PROCEDURE — 83735 ASSAY OF MAGNESIUM: CPT

## 2018-06-08 PROCEDURE — 20600001 HC STEP DOWN PRIVATE ROOM

## 2018-06-08 PROCEDURE — 25000003 PHARM REV CODE 250: Performed by: HOSPITALIST

## 2018-06-08 PROCEDURE — 25000003 PHARM REV CODE 250: Performed by: STUDENT IN AN ORGANIZED HEALTH CARE EDUCATION/TRAINING PROGRAM

## 2018-06-08 RX ORDER — LANOLIN ALCOHOL/MO/W.PET/CERES
800 CREAM (GRAM) TOPICAL EVERY 4 HOURS PRN
Status: DISCONTINUED | OUTPATIENT
Start: 2018-06-08 | End: 2018-06-17

## 2018-06-08 RX ORDER — SODIUM,POTASSIUM PHOSPHATES 280-250MG
1 POWDER IN PACKET (EA) ORAL EVERY 4 HOURS PRN
Status: DISCONTINUED | OUTPATIENT
Start: 2018-06-08 | End: 2018-06-17

## 2018-06-08 RX ORDER — LANOLIN ALCOHOL/MO/W.PET/CERES
800 CREAM (GRAM) TOPICAL ONCE
Status: COMPLETED | OUTPATIENT
Start: 2018-06-08 | End: 2018-06-08

## 2018-06-08 RX ORDER — LANOLIN ALCOHOL/MO/W.PET/CERES
400 CREAM (GRAM) TOPICAL EVERY 4 HOURS PRN
Status: DISCONTINUED | OUTPATIENT
Start: 2018-06-08 | End: 2018-06-17

## 2018-06-08 RX ORDER — POTASSIUM CHLORIDE 750 MG/1
20 CAPSULE, EXTENDED RELEASE ORAL
Status: DISCONTINUED | OUTPATIENT
Start: 2018-06-08 | End: 2018-06-22

## 2018-06-08 RX ORDER — SODIUM,POTASSIUM PHOSPHATES 280-250MG
2 POWDER IN PACKET (EA) ORAL EVERY 4 HOURS PRN
Status: DISCONTINUED | OUTPATIENT
Start: 2018-06-08 | End: 2018-06-17

## 2018-06-08 RX ADMIN — Medication 10 ML: at 01:06

## 2018-06-08 RX ADMIN — ONDANSETRON 4 MG: 2 INJECTION INTRAMUSCULAR; INTRAVENOUS at 12:06

## 2018-06-08 RX ADMIN — FAMOTIDINE 20 MG: 20 TABLET ORAL at 09:06

## 2018-06-08 RX ADMIN — VORICONAZOLE 200 MG: 200 TABLET, FILM COATED ORAL at 09:06

## 2018-06-08 RX ADMIN — Medication 10 ML: at 08:06

## 2018-06-08 RX ADMIN — ALLOPURINOL 300 MG: 300 TABLET ORAL at 09:06

## 2018-06-08 RX ADMIN — FAMOTIDINE 20 MG: 20 TABLET ORAL at 08:06

## 2018-06-08 RX ADMIN — ESCITALOPRAM 10 MG: 5 TABLET, FILM COATED ORAL at 09:06

## 2018-06-08 RX ADMIN — CIPROFLOXACIN HYDROCHLORIDE 500 MG: 500 TABLET, FILM COATED ORAL at 09:06

## 2018-06-08 RX ADMIN — MORPHINE SULFATE 15 MG: 15 TABLET ORAL at 03:06

## 2018-06-08 RX ADMIN — Medication 800 MG: at 09:06

## 2018-06-08 RX ADMIN — VORICONAZOLE 200 MG: 200 TABLET, FILM COATED ORAL at 08:06

## 2018-06-08 RX ADMIN — Medication 10 ML: at 10:06

## 2018-06-08 RX ADMIN — ONDANSETRON 4 MG: 2 INJECTION INTRAMUSCULAR; INTRAVENOUS at 05:06

## 2018-06-08 RX ADMIN — ACYCLOVIR 400 MG: 200 CAPSULE ORAL at 09:06

## 2018-06-08 RX ADMIN — SODIUM PHOSPHATE, MONOBASIC, MONOHYDRATE 39.99 MMOL: 276; 142 INJECTION, SOLUTION INTRAVENOUS at 10:06

## 2018-06-08 RX ADMIN — Medication 10 ML: at 06:06

## 2018-06-08 RX ADMIN — MORPHINE SULFATE 15 MG: 15 TABLET ORAL at 10:06

## 2018-06-08 RX ADMIN — METOPROLOL SUCCINATE 25 MG: 25 TABLET, EXTENDED RELEASE ORAL at 09:06

## 2018-06-08 RX ADMIN — MORPHINE SULFATE 15 MG: 15 TABLET ORAL at 08:06

## 2018-06-08 RX ADMIN — CIPROFLOXACIN HYDROCHLORIDE 500 MG: 500 TABLET, FILM COATED ORAL at 08:06

## 2018-06-08 RX ADMIN — ACYCLOVIR 400 MG: 200 CAPSULE ORAL at 08:06

## 2018-06-08 NOTE — SUBJECTIVE & OBJECTIVE
Subjective:     Interval History:   - Day 11 of induction chemotherapy, Midostaurin started 6/6  - liver enzymes elevated this am, will hold Midostaurin today  - have a great deal of pain around labial mucositis but states it's improving  - has sore in corner of mouth and left side tongue  - afebrile, VSS    Objective:     Vital Signs (Most Recent):  Temp: 99.2 °F (37.3 °C) (06/08/18 1159)  Pulse: 69 (06/08/18 1159)  Resp: 19 (06/08/18 1159)  BP: (!) 101/52 (06/08/18 1159)  SpO2: 98 % (06/08/18 1159) Vital Signs (24h Range):  Temp:  [97.8 °F (36.6 °C)-99.2 °F (37.3 °C)] 99.2 °F (37.3 °C)  Pulse:  [67-80] 69  Resp:  [18-20] 19  SpO2:  [94 %-99 %] 98 %  BP: (101-123)/(52-58) 101/52     Weight: 70.7 kg (155 lb 12.1 oz)  Body mass index is 27.59 kg/m².  Body surface area is 1.77 meters squared.      Intake/Output - Last 3 Shifts       06/06 0700 - 06/07 0659 06/07 0700 - 06/08 0659 06/08 0700 - 06/09 0659    P.O. 1960 360     Blood 238      IV Piggyback   250    Total Intake(mL/kg) 2198 (31) 360 (5.1) 250 (3.5)    Urine (mL/kg/hr) 2750 (1.6) 1375 (0.8)     Total Output 2750 1375      Net -552 -1015 +250           Stool Occurrence 0 x 1 x           Physical Exam   Constitutional: She is oriented to person, place, and time. She appears well-developed and well-nourished. No distress.   HENT:   Head: Normocephalic and atraumatic.   Eyes: Pupils are equal, round, and reactive to light.   Neck: Normal range of motion.   Cardiovascular: Normal rate and regular rhythm.    No murmur heard.  Pulmonary/Chest: Effort normal and breath sounds normal. No respiratory distress.   Abdominal: Soft. Bowel sounds are normal. There is no tenderness.   Genitourinary:   Genitourinary Comments: + labial erythematous, maculopapular rash.    Musculoskeletal: Normal range of motion. She exhibits no edema.   Neurological: She is alert and oriented to person, place, and time.   Skin: Skin is warm and dry.   Vitals reviewed.      Significant Labs:    CBC:   Recent Labs  Lab 06/07/18  0413 06/08/18  0439   WBC 0.20* 0.25*   HGB 6.6* 7.2*   HCT 19.7* 21.7*   PLT 4* 40*    and CMP:   Recent Labs  Lab 06/07/18  0413 06/08/18 0439    137   K 4.9 5.2*    110   CO2 20* 19*   * 96   BUN 14 18   CREATININE 0.7 0.7   CALCIUM 8.2* 7.8*   PROT 6.5 6.5   ALBUMIN 3.1* 3.0*   BILITOT 0.5 1.9*   ALKPHOS 304* 616*   AST 82* 470*   ALT 35 299*   ANIONGAP 6* 8   EGFRNONAA >60.0 >60.0       Diagnostic Results:  None

## 2018-06-08 NOTE — ASSESSMENT & PLAN NOTE
- secondary to AML and chemotherapy.   - wbc: 250/mm3 with ANC: 0, Hb: 7.2 g/dL, and Plt: 40,000/mm3.   - responded appropriately to transfusion yesterday  - tranfuse if Hb < 7 g/dL and Plt < 10,000/mm3 or active bleed.

## 2018-06-08 NOTE — ASSESSMENT & PLAN NOTE
- Rash appears smaller in area, less painful per patient. She reports that Clabetasol cream makes irritation worse so it was discontinued  - increase frequency of morphine to 2 mg IV Q3 hrs

## 2018-06-08 NOTE — PLAN OF CARE
Problem: Patient Care Overview  Goal: Plan of Care Review  Outcome: Ongoing (interventions implemented as appropriate)  Pt is s/p 7&3, awaiting count recovery. Afebrile. Rash noted to vaginal area. Up ad gunnar to bathroom. Denies any pain. Small sore noted at left corner of mouth and swelling to left side of tongue, order obtained for dukes solution. Nausea medication administered x 1.

## 2018-06-08 NOTE — ASSESSMENT & PLAN NOTE
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 Ordered routine labs in addition to FLT3, NGS, and AML FISH.  - lo res HLA typing on 5/26/18 and hi res on 5/27/18 done in anticipation of possible need for future transplant   - Pt with two daughters, two full sisters (in their mid 60s, one with brain aneurysm hx, other one without any medical issues), and one full brother (61 y/o healthy).  - ECHO ordered 5/25/18, EF 65%  - PICC line placed 5/25/18   - initially on hydrea, discontinued on 5/28.   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  - started on induction therapy on 5/29/18.  - Day 11 of induction therapy, tolerating treatment fairly well.    - On allopurinol 300 mg daily, risk for tumor lysis likely low at this point.   - prophylactic antimicrobials: Acyclovir, Cipro, Voriconazole  - will need CNS Prophylaxis given her elevated WBC (high risk) on admission after count recovery.  - NPM1 +, CEBPA (-), FLT3 (+).  On Midostaurin 50 mg PO BID until Day 14 (held starting 6/8).   - generally tolerating treatment well aside from painful rash which she developed to labia  - will plan for empty bone marrow biopsy on Monday or Tuesday of next week

## 2018-06-08 NOTE — PROGRESS NOTES
[facility]  Hematology  Bone Marrow Transplant  Progress Note    Patient Name: Noreen Mac  Admission Date: 5/24/2018  Hospital Length of Stay: 15 days  Code Status: Full Code    Subjective:     Interval History:   - Day 11 of induction chemotherapy, Midostaurin started 6/6  - liver enzymes elevated this am, will hold Midostaurin today  - have a great deal of pain around labial mucositis but states it's improving  - has sore in corner of mouth and left side tongue  - afebrile, VSS    Objective:     Vital Signs (Most Recent):  Temp: 99.2 °F (37.3 °C) (06/08/18 1159)  Pulse: 69 (06/08/18 1159)  Resp: 19 (06/08/18 1159)  BP: (!) 101/52 (06/08/18 1159)  SpO2: 98 % (06/08/18 1159) Vital Signs (24h Range):  Temp:  [97.8 °F (36.6 °C)-99.2 °F (37.3 °C)] 99.2 °F (37.3 °C)  Pulse:  [67-80] 69  Resp:  [18-20] 19  SpO2:  [94 %-99 %] 98 %  BP: (101-123)/(52-58) 101/52     Weight: 70.7 kg (155 lb 12.1 oz)  Body mass index is 27.59 kg/m².  Body surface area is 1.77 meters squared.      Intake/Output - Last 3 Shifts       06/06 0700 - 06/07 0659 06/07 0700 - 06/08 0659 06/08 0700 - 06/09 0659    P.O. 1960 360     Blood 238      IV Piggyback   250    Total Intake(mL/kg) 2198 (31) 360 (5.1) 250 (3.5)    Urine (mL/kg/hr) 2750 (1.6) 1375 (0.8)     Total Output 2750 1375      Net -552 -1015 +250           Stool Occurrence 0 x 1 x           Physical Exam   Constitutional: She is oriented to person, place, and time. She appears well-developed and well-nourished. No distress.   HENT:   Head: Normocephalic and atraumatic.   Eyes: Pupils are equal, round, and reactive to light.   Neck: Normal range of motion.   Cardiovascular: Normal rate and regular rhythm.    No murmur heard.  Pulmonary/Chest: Effort normal and breath sounds normal. No respiratory distress.   Abdominal: Soft. Bowel sounds are normal. There is no tenderness.   Genitourinary:   Genitourinary Comments: + labial erythematous, maculopapular rash.     Musculoskeletal: Normal range of motion. She exhibits no edema.   Neurological: She is alert and oriented to person, place, and time.   Skin: Skin is warm and dry.   Vitals reviewed.      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 Ordered routine labs in addition to FLT3, NGS, and AML FISH.  - lo res HLA typing on 5/26/18 and hi res on 5/27/18 done in anticipation of possible need for future transplant   - Pt with two daughters, two full sisters (in their mid 60s, one with brain aneurysm hx, other one without any medical issues), and one full brother (61 y/o healthy).  - ECHO ordered 5/25/18, EF 65%  - PICC line placed 5/25/18   - initially on hydrea, discontinued on 5/28.   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  - started on induction therapy on 5/29/18.  - Day 11 of induction therapy, tolerating treatment fairly well.    - On allopurinol 300 mg daily, risk for tumor lysis likely low at this point.   - prophylactic antimicrobials: Acyclovir, Cipro, Voriconazole  - will need CNS Prophylaxis given her elevated WBC (high risk) on admission after count recovery.  - NPM1 +, CEBPA (-), FLT3 (+).  On Midostaurin 50 mg PO BID until Day 14 (held starting 6/8).   - generally  tolerating treatment well aside from painful rash which she developed to labia  - will plan for empty bone marrow biopsy on Monday or Tuesday of next week            Abnormal liver enzymes    - baseline normal  - elevated bilirubin 1.9,  and  today  - will hold Midostaurin        Hypophosphatemia    - phos 1.7 today  - will replace with 40 mm of IV phos  - start electrolyte protocol        Hypomagnesemia    - mag 1.5 today  - will give 800 po mag today  - start electrolyte replacement protocol        Vaginal mucositis    - Rash appears smaller in area, less painful per patient. She reports that Clabetasol cream makes irritation worse so it was discontinued  - increase frequency of morphine to 2 mg IV Q3 hrs        Pancytopenia    - secondary to AML and chemotherapy.   - wbc: 250/mm3 with ANC: 0, Hb: 7.2 g/dL, and Plt: 40,000/mm3.   - responded appropriately to transfusion yesterday  - tranfuse if Hb < 7 g/dL and Plt < 10,000/mm3 or active bleed.           MCTD (mixed connective tissue disease)    - MCTD-NOS  - previously on MTX and Prednisone.   - off therapy now and otherwise doing well.   - can follow up outpatient with her Rheumatologist upon discharge.         Bilateral headaches    - complained of bilateral temporal headaches earlier in hospitalization course  - similar to ones on presentation at OSH  - CT Head done at OSH was unremarkable.   - will consider re-imaging if deemed necessary.   - no complaints of headache today  - PRN Tylenol for now.           Neutropenic fever    - issue resolved now.   - spiked temp on 5/29 of 102, afebrile since then.   - blood cultures no growth, CXR and UA unremarkable. Was on cefepime for 2 days.           Pulmonary nodule    - CXR suggestive of 7 mm slightly irregular shaped radiodensity projected over the left upper lung zone which could represent a calcified granuloma but remains indeterminate.  - CT Chest on 5/28: Multiple scattered noncalcified pulmonary  granulomas, the largest measuring 8 mm in the lateral segment of the right middle lobe.   - repeat non-contrast chest CT at 6-12 months         Insomnia    - PRN nightly Xanax.         HTN (hypertension)    - history of SVT and reports intermittent palpitations at times.  - EKG on 5/28 - NSR.  - increased metoprolol to 25 mg QD,she feels better with this dose.        Electrolyte abnormality    - will replace electrolytes as needed.  - Mg Sulfate 2 gm IV for Mg of 1.4 this AM          GERD (gastroesophageal reflux disease)    - takes Omeprazole at home, now on pepcid here  - no issue         Osteoporosis    - takes prolia once every 6 months, last given mid April 2018   - no acute issue         Depression    - no acute issue   - continue home Escitalopram             VTE Risk Mitigation         Ordered     heparin, porcine (PF) 100 unit/mL injection flush 300 Units  As needed (PRN)      05/29/18 1024     Place MAYLIN hose  Until discontinued      05/25/18 0032     IP VTE HIGH RISK PATIENT  Once      05/25/18 0032          Disposition: pending count recovery post chemo    Sweetie Lepe NP  Bone Marrow Transplant  [facility]

## 2018-06-08 NOTE — PLAN OF CARE
Problem: Patient Care Overview  Goal: Plan of Care Review  Outcome: Ongoing (interventions implemented as appropriate)  Side rails up x2; call bell in place; bed in lowest, locked position; skid proof socks on; no evidence of skin breakdown; care plan explained to patient; pt remains free of injury. Pt not tolerating po well today. Pt with c/o nausea, and pain. Zofran and morphine 15 mg po given. Pt stated she had relief. Also with complaint of sore to tongue and lip, dukes given. Pt ambulates, voids and with one BM. Pt received sodium phos and mg replacements. VSS and afebrile.

## 2018-06-09 LAB
ALBUMIN SERPL BCP-MCNC: 3 G/DL
ALP SERPL-CCNC: 650 U/L
ALT SERPL W/O P-5'-P-CCNC: 234 U/L
ANION GAP SERPL CALC-SCNC: 8 MMOL/L
ANISOCYTOSIS BLD QL SMEAR: SLIGHT
AST SERPL-CCNC: 149 U/L
BASOPHILS # BLD AUTO: 0 K/UL
BASOPHILS NFR BLD: 0 %
BILIRUB SERPL-MCNC: 2.5 MG/DL
BUN SERPL-MCNC: 18 MG/DL
CALCIUM SERPL-MCNC: 7.6 MG/DL
CHLORIDE SERPL-SCNC: 105 MMOL/L
CO2 SERPL-SCNC: 20 MMOL/L
CREAT SERPL-MCNC: 0.7 MG/DL
DIFFERENTIAL METHOD: ABNORMAL
EOSINOPHIL # BLD AUTO: 0 K/UL
EOSINOPHIL NFR BLD: 0 %
ERYTHROCYTE [DISTWIDTH] IN BLOOD BY AUTOMATED COUNT: 15.9 %
EST. GFR  (AFRICAN AMERICAN): >60 ML/MIN/1.73 M^2
EST. GFR  (NON AFRICAN AMERICAN): >60 ML/MIN/1.73 M^2
GLUCOSE SERPL-MCNC: 102 MG/DL
HCT VFR BLD AUTO: 23 %
HGB BLD-MCNC: 7.7 G/DL
HYPOCHROMIA BLD QL SMEAR: ABNORMAL
IMM GRANULOCYTES # BLD AUTO: 0 K/UL
IMM GRANULOCYTES NFR BLD AUTO: 0 %
LYMPHOCYTES # BLD AUTO: 0.2 K/UL
LYMPHOCYTES NFR BLD: 96 %
MAGNESIUM SERPL-MCNC: 1.7 MG/DL
MCH RBC QN AUTO: 30.7 PG
MCHC RBC AUTO-ENTMCNC: 33.5 G/DL
MCV RBC AUTO: 92 FL
MONOCYTES # BLD AUTO: 0 K/UL
MONOCYTES NFR BLD: 0 %
NEUTROPHILS # BLD AUTO: 0 K/UL
NEUTROPHILS NFR BLD: 4 %
NRBC BLD-RTO: 0 /100 WBC
OVALOCYTES BLD QL SMEAR: ABNORMAL
PHOSPHATE SERPL-MCNC: 2.1 MG/DL
PLATELET # BLD AUTO: 11 K/UL
PLATELET BLD QL SMEAR: ABNORMAL
PMV BLD AUTO: 10.1 FL
POIKILOCYTOSIS BLD QL SMEAR: SLIGHT
POLYCHROMASIA BLD QL SMEAR: ABNORMAL
POTASSIUM SERPL-SCNC: 4.4 MMOL/L
PROT SERPL-MCNC: 6.6 G/DL
RBC # BLD AUTO: 2.51 M/UL
SODIUM SERPL-SCNC: 133 MMOL/L
WBC # BLD AUTO: 0.25 K/UL

## 2018-06-09 PROCEDURE — 25000003 PHARM REV CODE 250: Performed by: INTERNAL MEDICINE

## 2018-06-09 PROCEDURE — 25000003 PHARM REV CODE 250: Performed by: NURSE PRACTITIONER

## 2018-06-09 PROCEDURE — 25000003 PHARM REV CODE 250: Performed by: STUDENT IN AN ORGANIZED HEALTH CARE EDUCATION/TRAINING PROGRAM

## 2018-06-09 PROCEDURE — 99233 SBSQ HOSP IP/OBS HIGH 50: CPT | Mod: GC,,, | Performed by: INTERNAL MEDICINE

## 2018-06-09 PROCEDURE — 84100 ASSAY OF PHOSPHORUS: CPT

## 2018-06-09 PROCEDURE — 25000003 PHARM REV CODE 250: Performed by: HOSPITALIST

## 2018-06-09 PROCEDURE — 83735 ASSAY OF MAGNESIUM: CPT

## 2018-06-09 PROCEDURE — A4216 STERILE WATER/SALINE, 10 ML: HCPCS | Performed by: INTERNAL MEDICINE

## 2018-06-09 PROCEDURE — 85025 COMPLETE CBC W/AUTO DIFF WBC: CPT

## 2018-06-09 PROCEDURE — 63600175 PHARM REV CODE 636 W HCPCS: Performed by: HOSPITALIST

## 2018-06-09 PROCEDURE — 20600001 HC STEP DOWN PRIVATE ROOM

## 2018-06-09 PROCEDURE — 80053 COMPREHEN METABOLIC PANEL: CPT

## 2018-06-09 RX ORDER — ONDANSETRON 4 MG/1
4 TABLET, ORALLY DISINTEGRATING ORAL EVERY 8 HOURS
Status: DISCONTINUED | OUTPATIENT
Start: 2018-06-09 | End: 2018-06-10

## 2018-06-09 RX ORDER — SODIUM CHLORIDE, SODIUM LACTATE, POTASSIUM CHLORIDE, CALCIUM CHLORIDE 600; 310; 30; 20 MG/100ML; MG/100ML; MG/100ML; MG/100ML
INJECTION, SOLUTION INTRAVENOUS CONTINUOUS
Status: ACTIVE | OUTPATIENT
Start: 2018-06-09 | End: 2018-06-10

## 2018-06-09 RX ADMIN — Medication 10 ML: at 12:06

## 2018-06-09 RX ADMIN — ONDANSETRON 4 MG: 2 INJECTION INTRAMUSCULAR; INTRAVENOUS at 04:06

## 2018-06-09 RX ADMIN — Medication 10 ML: at 08:06

## 2018-06-09 RX ADMIN — CIPROFLOXACIN HYDROCHLORIDE 500 MG: 500 TABLET, FILM COATED ORAL at 09:06

## 2018-06-09 RX ADMIN — ALLOPURINOL 300 MG: 300 TABLET ORAL at 08:06

## 2018-06-09 RX ADMIN — Medication 10 ML: at 09:06

## 2018-06-09 RX ADMIN — SODIUM CHLORIDE, SODIUM LACTATE, POTASSIUM CHLORIDE, AND CALCIUM CHLORIDE: .6; .31; .03; .02 INJECTION, SOLUTION INTRAVENOUS at 12:06

## 2018-06-09 RX ADMIN — ACYCLOVIR 400 MG: 200 CAPSULE ORAL at 09:06

## 2018-06-09 RX ADMIN — Medication 10 ML: at 05:06

## 2018-06-09 RX ADMIN — ONDANSETRON 4 MG: 2 INJECTION INTRAMUSCULAR; INTRAVENOUS at 10:06

## 2018-06-09 RX ADMIN — ESCITALOPRAM 10 MG: 5 TABLET, FILM COATED ORAL at 08:06

## 2018-06-09 RX ADMIN — POTASSIUM & SODIUM PHOSPHATES POWDER PACK 280-160-250 MG 1 PACKET: 280-160-250 PACK at 06:06

## 2018-06-09 RX ADMIN — ALPRAZOLAM 0.25 MG: 0.25 TABLET ORAL at 02:06

## 2018-06-09 RX ADMIN — VORICONAZOLE 200 MG: 200 TABLET, FILM COATED ORAL at 09:06

## 2018-06-09 RX ADMIN — FAMOTIDINE 20 MG: 20 TABLET ORAL at 08:06

## 2018-06-09 RX ADMIN — Medication 400 MG: at 10:06

## 2018-06-09 RX ADMIN — ACYCLOVIR 400 MG: 200 CAPSULE ORAL at 08:06

## 2018-06-09 RX ADMIN — ONDANSETRON 4 MG: 4 TABLET, ORALLY DISINTEGRATING ORAL at 01:06

## 2018-06-09 RX ADMIN — POTASSIUM & SODIUM PHOSPHATES POWDER PACK 280-160-250 MG 1 PACKET: 280-160-250 PACK at 05:06

## 2018-06-09 RX ADMIN — ONDANSETRON 4 MG: 4 TABLET, ORALLY DISINTEGRATING ORAL at 09:06

## 2018-06-09 RX ADMIN — POTASSIUM & SODIUM PHOSPHATES POWDER PACK 280-160-250 MG 1 PACKET: 280-160-250 PACK at 01:06

## 2018-06-09 RX ADMIN — Medication 400 MG: at 06:06

## 2018-06-09 RX ADMIN — MORPHINE SULFATE 15 MG: 15 TABLET ORAL at 04:06

## 2018-06-09 RX ADMIN — VORICONAZOLE 200 MG: 200 TABLET, FILM COATED ORAL at 08:06

## 2018-06-09 RX ADMIN — CALCIUM CARBONATE (ANTACID) CHEW TAB 500 MG 500 MG: 500 CHEW TAB at 07:06

## 2018-06-09 RX ADMIN — POTASSIUM & SODIUM PHOSPHATES POWDER PACK 280-160-250 MG 1 PACKET: 280-160-250 PACK at 10:06

## 2018-06-09 RX ADMIN — CIPROFLOXACIN HYDROCHLORIDE 500 MG: 500 TABLET, FILM COATED ORAL at 08:06

## 2018-06-09 RX ADMIN — FAMOTIDINE 20 MG: 20 TABLET ORAL at 09:06

## 2018-06-09 NOTE — PLAN OF CARE
Problem: Patient Care Overview  Goal: Plan of Care Review  Outcome: Ongoing (interventions implemented as appropriate)  Patient remains free from falls and injury this shift. Bed in low, locked position with call bell in reach. Patient encouraged to call for assistance when getting out of bed. Patient verbalized understanding. All belongings within reach. Afebrile. Ambulating indepdnently. Patient c/o frequent nausea - zofran scheduled; adequate relief of symptoms achieved. Tolerating small snacks throughout the day. IVFs x1 bag ordered for concentrated urine and decrease in PO intake d/t nausea. C/o one episode of anxiety requiring xanax. No c/o pain or discomfort. will continue to monitor.

## 2018-06-10 LAB
ALBUMIN SERPL BCP-MCNC: 2.6 G/DL
ALP SERPL-CCNC: 493 U/L
ALT SERPL W/O P-5'-P-CCNC: 126 U/L
ANION GAP SERPL CALC-SCNC: 9 MMOL/L
ANISOCYTOSIS BLD QL SMEAR: SLIGHT
AST SERPL-CCNC: 26 U/L
BASOPHILS # BLD AUTO: 0 K/UL
BASOPHILS NFR BLD: 0 %
BILIRUB SERPL-MCNC: 1.2 MG/DL
BLD PROD TYP BPU: NORMAL
BLOOD UNIT EXPIRATION DATE: NORMAL
BLOOD UNIT TYPE CODE: 6200
BLOOD UNIT TYPE: NORMAL
BUN SERPL-MCNC: 13 MG/DL
CALCIUM SERPL-MCNC: 7.4 MG/DL
CHLORIDE SERPL-SCNC: 105 MMOL/L
CO2 SERPL-SCNC: 20 MMOL/L
CODING SYSTEM: NORMAL
CREAT SERPL-MCNC: 0.8 MG/DL
DIFFERENTIAL METHOD: ABNORMAL
DISPENSE STATUS: NORMAL
EOSINOPHIL # BLD AUTO: 0 K/UL
EOSINOPHIL NFR BLD: 0 %
ERYTHROCYTE [DISTWIDTH] IN BLOOD BY AUTOMATED COUNT: 15.6 %
EST. GFR  (AFRICAN AMERICAN): >60 ML/MIN/1.73 M^2
EST. GFR  (NON AFRICAN AMERICAN): >60 ML/MIN/1.73 M^2
GLUCOSE SERPL-MCNC: 113 MG/DL
HCT VFR BLD AUTO: 22.4 %
HGB BLD-MCNC: 7.6 G/DL
HYPOCHROMIA BLD QL SMEAR: ABNORMAL
IMM GRANULOCYTES # BLD AUTO: 0 K/UL
IMM GRANULOCYTES NFR BLD AUTO: 0 %
LYMPHOCYTES # BLD AUTO: 0.1 K/UL
LYMPHOCYTES NFR BLD: 100 %
MAGNESIUM SERPL-MCNC: 1.4 MG/DL
MCH RBC QN AUTO: 30.9 PG
MCHC RBC AUTO-ENTMCNC: 33.9 G/DL
MCV RBC AUTO: 91 FL
MONOCYTES # BLD AUTO: 0 K/UL
MONOCYTES NFR BLD: 0 %
NEUTROPHILS # BLD AUTO: 0 K/UL
NEUTROPHILS NFR BLD: 0 %
NRBC BLD-RTO: 0 /100 WBC
NUM UNITS TRANS WBC-POOR PLATPHERESIS: NORMAL
PHOSPHATE SERPL-MCNC: 2 MG/DL
PLATELET # BLD AUTO: 5 K/UL
PLATELET BLD QL SMEAR: ABNORMAL
PMV BLD AUTO: ABNORMAL FL
POTASSIUM SERPL-SCNC: 4.1 MMOL/L
PROT SERPL-MCNC: 6.1 G/DL
RBC # BLD AUTO: 2.46 M/UL
SODIUM SERPL-SCNC: 134 MMOL/L
WBC # BLD AUTO: 0.12 K/UL

## 2018-06-10 PROCEDURE — 99233 SBSQ HOSP IP/OBS HIGH 50: CPT | Mod: ,,, | Performed by: INTERNAL MEDICINE

## 2018-06-10 PROCEDURE — 20600001 HC STEP DOWN PRIVATE ROOM

## 2018-06-10 PROCEDURE — 83735 ASSAY OF MAGNESIUM: CPT

## 2018-06-10 PROCEDURE — 63600175 PHARM REV CODE 636 W HCPCS: Performed by: HOSPITALIST

## 2018-06-10 PROCEDURE — 25000003 PHARM REV CODE 250: Performed by: INTERNAL MEDICINE

## 2018-06-10 PROCEDURE — P9037 PLATE PHERES LEUKOREDU IRRAD: HCPCS

## 2018-06-10 PROCEDURE — 63600175 PHARM REV CODE 636 W HCPCS: Performed by: INTERNAL MEDICINE

## 2018-06-10 PROCEDURE — 84100 ASSAY OF PHOSPHORUS: CPT

## 2018-06-10 PROCEDURE — 80053 COMPREHEN METABOLIC PANEL: CPT

## 2018-06-10 PROCEDURE — 25000003 PHARM REV CODE 250: Performed by: HOSPITALIST

## 2018-06-10 PROCEDURE — 87449 NOS EACH ORGANISM AG IA: CPT

## 2018-06-10 PROCEDURE — 85025 COMPLETE CBC W/AUTO DIFF WBC: CPT

## 2018-06-10 PROCEDURE — A4216 STERILE WATER/SALINE, 10 ML: HCPCS | Performed by: INTERNAL MEDICINE

## 2018-06-10 PROCEDURE — 25000003 PHARM REV CODE 250: Performed by: STUDENT IN AN ORGANIZED HEALTH CARE EDUCATION/TRAINING PROGRAM

## 2018-06-10 PROCEDURE — 36430 TRANSFUSION BLD/BLD COMPNT: CPT

## 2018-06-10 PROCEDURE — 25000003 PHARM REV CODE 250: Performed by: NURSE PRACTITIONER

## 2018-06-10 RX ORDER — SODIUM CHLORIDE, SODIUM LACTATE, POTASSIUM CHLORIDE, CALCIUM CHLORIDE 600; 310; 30; 20 MG/100ML; MG/100ML; MG/100ML; MG/100ML
INJECTION, SOLUTION INTRAVENOUS CONTINUOUS
Status: ACTIVE | OUTPATIENT
Start: 2018-06-10 | End: 2018-06-10

## 2018-06-10 RX ORDER — DEXTROSE MONOHYDRATE, SODIUM CHLORIDE, AND POTASSIUM CHLORIDE 50; 1.49; 4.5 G/1000ML; G/1000ML; G/1000ML
INJECTION, SOLUTION INTRAVENOUS CONTINUOUS
Status: DISPENSED | OUTPATIENT
Start: 2018-06-10 | End: 2018-06-11

## 2018-06-10 RX ORDER — ONDANSETRON 2 MG/ML
4 INJECTION INTRAMUSCULAR; INTRAVENOUS EVERY 6 HOURS
Status: DISCONTINUED | OUTPATIENT
Start: 2018-06-10 | End: 2018-06-11

## 2018-06-10 RX ORDER — METOCLOPRAMIDE HYDROCHLORIDE 5 MG/ML
5 INJECTION INTRAMUSCULAR; INTRAVENOUS EVERY 6 HOURS PRN
Status: DISCONTINUED | OUTPATIENT
Start: 2018-06-10 | End: 2018-06-11

## 2018-06-10 RX ORDER — SIMETHICONE 80 MG
1 TABLET,CHEWABLE ORAL 3 TIMES DAILY PRN
Status: DISCONTINUED | OUTPATIENT
Start: 2018-06-10 | End: 2018-07-13 | Stop reason: HOSPADM

## 2018-06-10 RX ORDER — HYDROCODONE BITARTRATE AND ACETAMINOPHEN 500; 5 MG/1; MG/1
TABLET ORAL
Status: DISCONTINUED | OUTPATIENT
Start: 2018-06-10 | End: 2018-06-11

## 2018-06-10 RX ORDER — METOCLOPRAMIDE 5 MG/1
5 TABLET ORAL EVERY 6 HOURS PRN
Status: DISCONTINUED | OUTPATIENT
Start: 2018-06-10 | End: 2018-06-10

## 2018-06-10 RX ADMIN — Medication 10 ML: at 11:06

## 2018-06-10 RX ADMIN — POTASSIUM & SODIUM PHOSPHATES POWDER PACK 280-160-250 MG 2 PACKET: 280-160-250 PACK at 06:06

## 2018-06-10 RX ADMIN — ONDANSETRON 4 MG: 2 INJECTION INTRAMUSCULAR; INTRAVENOUS at 11:06

## 2018-06-10 RX ADMIN — POTASSIUM & SODIUM PHOSPHATES POWDER PACK 280-160-250 MG 2 PACKET: 280-160-250 PACK at 02:06

## 2018-06-10 RX ADMIN — POTASSIUM CHLORIDE, DEXTROSE MONOHYDRATE AND SODIUM CHLORIDE: 150; 5; 450 INJECTION, SOLUTION INTRAVENOUS at 11:06

## 2018-06-10 RX ADMIN — ALUMINUM HYDROXIDE, MAGNESIUM HYDROXIDE, AND SIMETHICONE 50 ML: 200; 200; 20 SUSPENSION ORAL at 12:06

## 2018-06-10 RX ADMIN — Medication 2 MG: at 02:06

## 2018-06-10 RX ADMIN — Medication 2 MG: at 08:06

## 2018-06-10 RX ADMIN — SODIUM CHLORIDE, SODIUM LACTATE, POTASSIUM CHLORIDE, AND CALCIUM CHLORIDE: .6; .31; .03; .02 INJECTION, SOLUTION INTRAVENOUS at 11:06

## 2018-06-10 RX ADMIN — Medication 400 MG: at 02:06

## 2018-06-10 RX ADMIN — PROMETHAZINE HYDROCHLORIDE 6.25 MG: 25 INJECTION INTRAMUSCULAR; INTRAVENOUS at 01:06

## 2018-06-10 RX ADMIN — ESCITALOPRAM 10 MG: 5 TABLET, FILM COATED ORAL at 08:06

## 2018-06-10 RX ADMIN — PROMETHAZINE HYDROCHLORIDE 6.25 MG: 25 INJECTION INTRAMUSCULAR; INTRAVENOUS at 12:06

## 2018-06-10 RX ADMIN — ACYCLOVIR 400 MG: 200 CAPSULE ORAL at 08:06

## 2018-06-10 RX ADMIN — RAMELTEON 8 MG: 8 TABLET, FILM COATED ORAL at 12:06

## 2018-06-10 RX ADMIN — METOCLOPRAMIDE HYDROCHLORIDE 5 MG: 5 TABLET ORAL at 08:06

## 2018-06-10 RX ADMIN — Medication 400 MG: at 06:06

## 2018-06-10 RX ADMIN — VORICONAZOLE 200 MG: 200 TABLET, FILM COATED ORAL at 08:06

## 2018-06-10 RX ADMIN — METOPROLOL SUCCINATE 25 MG: 25 TABLET, EXTENDED RELEASE ORAL at 08:06

## 2018-06-10 RX ADMIN — Medication 10 ML: at 08:06

## 2018-06-10 RX ADMIN — Medication 10 ML: at 01:06

## 2018-06-10 RX ADMIN — CIPROFLOXACIN HYDROCHLORIDE 500 MG: 500 TABLET, FILM COATED ORAL at 08:06

## 2018-06-10 RX ADMIN — SODIUM CHLORIDE, SODIUM LACTATE, POTASSIUM CHLORIDE, AND CALCIUM CHLORIDE: .6; .31; .03; .02 INJECTION, SOLUTION INTRAVENOUS at 07:06

## 2018-06-10 RX ADMIN — Medication 10 ML: at 05:06

## 2018-06-10 RX ADMIN — RAMELTEON 8 MG: 8 TABLET, FILM COATED ORAL at 08:06

## 2018-06-10 RX ADMIN — Medication 400 MG: at 10:06

## 2018-06-10 RX ADMIN — ALPRAZOLAM 0.25 MG: 0.25 TABLET ORAL at 02:06

## 2018-06-10 RX ADMIN — POTASSIUM & SODIUM PHOSPHATES POWDER PACK 280-160-250 MG 2 PACKET: 280-160-250 PACK at 10:06

## 2018-06-10 RX ADMIN — FAMOTIDINE 20 MG: 20 TABLET ORAL at 08:06

## 2018-06-10 RX ADMIN — Medication 10 ML: at 12:06

## 2018-06-10 RX ADMIN — ONDANSETRON 4 MG: 2 INJECTION INTRAMUSCULAR; INTRAVENOUS at 07:06

## 2018-06-10 RX ADMIN — ALLOPURINOL 300 MG: 300 TABLET ORAL at 08:06

## 2018-06-10 RX ADMIN — ONDANSETRON 4 MG: 4 TABLET, ORALLY DISINTEGRATING ORAL at 06:06

## 2018-06-10 RX ADMIN — ALPRAZOLAM 0.25 MG: 0.25 TABLET ORAL at 08:06

## 2018-06-10 RX ADMIN — ONDANSETRON 4 MG: 2 INJECTION INTRAMUSCULAR; INTRAVENOUS at 05:06

## 2018-06-10 RX ADMIN — PROCHLORPERAZINE MALEATE 10 MG: 5 TABLET, FILM COATED ORAL at 02:06

## 2018-06-10 RX ADMIN — METOCLOPRAMIDE 5 MG: 5 INJECTION, SOLUTION INTRAMUSCULAR; INTRAVENOUS at 08:06

## 2018-06-10 NOTE — PLAN OF CARE
Problem: Patient Care Overview  Goal: Plan of Care Review  Outcome: Ongoing (interventions implemented as appropriate)  Patient AAOx4. Plan of care and all safety precautions maintained and discussed. Vital signs stable throughout the shift. Patient complaint of nausea/indigestion with belching throughout the shift. PRN and scheduled medication administered. Patient remains free from fall/injury. Call bell in reach. Encouraged to call for assistance. Verbalized understanding. Will continue to monitor.

## 2018-06-10 NOTE — PROGRESS NOTES
[facility]  Hematology  Bone Marrow Transplant  Progress Note    Patient Name: Noreen Mac  Admission Date: 5/24/2018  Hospital Length of Stay: 16 days  Code Status: Full Code     Subjective:     Interval History:   - Day 12 of induction chemotherapy  - with significant nausea overnight    Objective:     Vital Signs (Most Recent):  Temp: 100.1 °F (37.8 °C) (06/09/18 1930)  Pulse: 92 (06/09/18 1545)  Resp: (!) 22 (06/09/18 1930)  BP: (!) 109/55 (06/09/18 1545)  SpO2: 97 % (06/09/18 1545) Vital Signs (24h Range):  Temp:  [98.6 °F (37 °C)-100.1 °F (37.8 °C)] 100.1 °F (37.8 °C)  Pulse:  [73-92] 92  Resp:  [18-22] 22  SpO2:  [95 %-97 %] 97 %  BP: ()/(51-55) 109/55     Weight: 69.7 kg (153 lb 8.8 oz)  Body mass index is 27.2 kg/m².  Body surface area is 1.76 meters squared.    ECOG SCORE         [unfilled]    Intake/Output - Last 3 Shifts       06/07 0700 - 06/08 0659 06/08 0700 - 06/09 0659 06/09 0700 - 06/10 0659    P.O.     I.V. (mL/kg)   870 (12.5)    IV Piggyback  250     Total Intake(mL/kg) 360 (5.1) 850 (12.2) 2720 (39.1)    Urine (mL/kg/hr) 1375 (0.8) 1400 (0.8) 500 (0.5)    Total Output 1375 1400 500    Net -1015 -550 +2220           Stool Occurrence 1 x  1 x          Physical Exam  Constitutional: She is oriented to person, place, and time. She appears well-developed and well-nourished. No distress.   HENT:   Head: Normocephalic and atraumatic.   Eyes: Pupils are equal, round, and reactive to light.   Neck: Normal range of motion.   Cardiovascular: Normal rate and regular rhythm.    No murmur heard.  Pulmonary/Chest: Effort normal and breath sounds normal. No respiratory distress.   Abdominal: Soft. Bowel sounds are normal. There is no tenderness.   Genitourinary:   Genitourinary Comments: + labial erythematous, maculopapular rash.    Musculoskeletal: Normal range of motion. She exhibits no edema.   Neurological: She is alert and oriented to person, place, and time.    Skin: Skin is warm and dry.   Vitals reviewed.    Significant Labs:   CBC:   Recent Labs  Lab 06/08/18  0439 06/09/18  0353   WBC 0.25* 0.25*   HGB 7.2* 7.7*   HCT 21.7* 23.0*   PLT 40* 11*    and CMP:   Recent Labs  Lab 06/08/18  0439 06/09/18  0353    133*   K 5.2* 4.4    105   CO2 19* 20*   GLU 96 102   BUN 18 18   CREATININE 0.7 0.7   CALCIUM 7.8* 7.6*   PROT 6.5 6.6   ALBUMIN 3.0* 3.0*   BILITOT 1.9* 2.5*   ALKPHOS 616* 650*   * 149*   * 234*   ANIONGAP 8 8   EGFRNONAA >60.0 >60.0       Diagnostic Results:  I have reviewed all pertinent imaging results/findings within the past 24 hours.    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 [C93.00] 05/24/2018 Yes    Hypomagnesemia [E83.42] 06/08/2018 Unknown    Hypophosphatemia [E83.39] 06/08/2018 Unknown    Abnormal liver enzymes [R74.8] 06/08/2018 Unknown    Vaginal mucositis [N76.81] 06/05/2018 No    Pancytopenia [D61.818] 05/30/2018 Unknown    Bilateral headaches [R51] 05/29/2018 Unknown    MCTD (mixed connective tissue disease) [M35.1] 05/29/2018 Unknown    Insomnia [G47.00] 05/28/2018 Unknown    Pulmonary nodule [R91.1] 05/28/2018 Unknown    Neutropenic fever [D70.9, R50.81] 05/28/2018 No    Depression [F32.9] 05/25/2018 Yes    Osteoporosis [M81.0] 05/25/2018 Yes    GERD (gastroesophageal reflux disease) [K21.9] 05/25/2018 Yes    HTN (hypertension) [I10] 05/25/2018 Unknown      Problems Resolved During this Admission:    Diagnosis Date Noted Date Resolved POA    Alteration in electrolyte and fluid balance [E87.8] 05/29/2018 06/08/2018 Unknown    Rash of genital area [R21] 05/25/2018 06/08/2018 Unknown       1. Acute monocytic Leukemia not having achieved remission  Admitted from [facility] with WBC of 100+. Blasts in periphery concerning for non-M3 AML. Bone marrow biopsy and aspiration on 5/25/18 confirms  diagnosis. Started on Hydrea  - low res and high res HLA typing sent  - NPM1+, FLT3+. She has been prescribed Midostaurin, but on hold due to elevated liver enzymes  - Day 12 of induction today  - will get Day 14 biopsy on Monday 6/11  - prophylactic Acyclovir, Cipro, Voriconazole  - will need CNS Prophylaxis    2. Chemotherapy induced nausea:  - Will schedule Zofran  - prn phenergan    3. Abnormal liver enzymes:  - significant rise in liver enzymes with start of Midostaurin  - Midostaurin on hold    4. Vaginal Mucositis:  Rash appears somewhat improved today.  - continue prn pain medication    5. Pancytopenia secondary to anti-neoplastic chemotherapy:  - ANC 0, hemoglobin 7.7, Platelets 11k this AM  - will transfuse for hemoglobin < 7 and platelets <10k    VTE Risk Mitigation         Ordered     heparin, porcine (PF) 100 unit/mL injection flush 300 Units  As needed (PRN)      05/29/18 1024     Place MAYLIN hose  Until discontinued      05/25/18 0032     IP VTE HIGH RISK PATIENT  Once      05/25/18 0032            Disposition: Continue to monitor counts daily. Transfusion support as neccessary. Plan for Bone marrow biopsy on Monday    Simon Luong MD  Bone Marrow Transplant  [facility]

## 2018-06-10 NOTE — NURSING
Spoke with Dr. Louie regarding patients complaint of nausea and indigestion with belching. Patient stated that Zofran was suppose to be ordered Q4, not Q8. Okay to give Zofran q6 hrs.

## 2018-06-10 NOTE — NURSING
Spoke with Dr. Louie regarding patient complaint of nausea/indigestion without relief from current medications. Gi cocktail and phenergan ordered.

## 2018-06-10 NOTE — PLAN OF CARE
Problem: Patient Care Overview  Goal: Plan of Care Review  Outcome: Ongoing (interventions implemented as appropriate)  Patient remains free from falls and injury this shift. Bed in low, locked position with call bell in reach. Patient encouraged to call for assistance when getting out of bed. Patient verbalized understanding. All belongings within reach. Afebrile. Ambulating independently. Patient c/o frequent nausea associated with abdominal pain - zofran IV scheduled with phenergan PRN and morphine IV used to gain adequate pain control. One episode of emesis this AM. Two liters of LR given with maintenance fluids to continue. Magnesium and phosphorus replacements given per PRN orders. will continue to monitor.

## 2018-06-11 LAB
ABO + RH BLD: NORMAL
ALBUMIN SERPL BCP-MCNC: 2.3 G/DL
ALP SERPL-CCNC: 336 U/L
ALT SERPL W/O P-5'-P-CCNC: 64 U/L
ANION GAP SERPL CALC-SCNC: 6 MMOL/L
ANISOCYTOSIS BLD QL SMEAR: SLIGHT
AST SERPL-CCNC: 7 U/L
BACTERIA #/AREA URNS AUTO: NORMAL /HPF
BASOPHILS # BLD AUTO: 0 K/UL
BASOPHILS NFR BLD: 0 %
BILIRUB SERPL-MCNC: 0.8 MG/DL
BILIRUB UR QL STRIP: NEGATIVE
BLD GP AB SCN CELLS X3 SERPL QL: NORMAL
BLD PROD TYP BPU: NORMAL
BLD PROD TYP BPU: NORMAL
BLOOD UNIT EXPIRATION DATE: NORMAL
BLOOD UNIT EXPIRATION DATE: NORMAL
BLOOD UNIT TYPE CODE: 5100
BLOOD UNIT TYPE CODE: 600
BLOOD UNIT TYPE: NORMAL
BLOOD UNIT TYPE: NORMAL
BUN SERPL-MCNC: 13 MG/DL
C DIFF GDH STL QL: NEGATIVE
C DIFF TOX A+B STL QL IA: NEGATIVE
CALCIUM SERPL-MCNC: 7.4 MG/DL
CHLORIDE SERPL-SCNC: 105 MMOL/L
CLARITY UR REFRACT.AUTO: CLEAR
CO2 SERPL-SCNC: 20 MMOL/L
CODING SYSTEM: NORMAL
CODING SYSTEM: NORMAL
COLOR UR AUTO: YELLOW
CREAT SERPL-MCNC: 0.7 MG/DL
DIFFERENTIAL METHOD: ABNORMAL
DISPENSE STATUS: NORMAL
DISPENSE STATUS: NORMAL
EOSINOPHIL # BLD AUTO: 0 K/UL
EOSINOPHIL NFR BLD: 0 %
ERYTHROCYTE [DISTWIDTH] IN BLOOD BY AUTOMATED COUNT: 15.6 %
EST. GFR  (AFRICAN AMERICAN): >60 ML/MIN/1.73 M^2
EST. GFR  (NON AFRICAN AMERICAN): >60 ML/MIN/1.73 M^2
GLUCOSE SERPL-MCNC: 117 MG/DL
GLUCOSE UR QL STRIP: NEGATIVE
HCT VFR BLD AUTO: 19.1 %
HGB BLD-MCNC: 6.4 G/DL
HGB UR QL STRIP: ABNORMAL
HYPOCHROMIA BLD QL SMEAR: ABNORMAL
IMM GRANULOCYTES # BLD AUTO: 0 K/UL
IMM GRANULOCYTES NFR BLD AUTO: 0 %
KETONES UR QL STRIP: NEGATIVE
LEUKOCYTE ESTERASE UR QL STRIP: NEGATIVE
LYMPHOCYTES # BLD AUTO: 0.1 K/UL
LYMPHOCYTES NFR BLD: 100 %
MAGNESIUM SERPL-MCNC: 1.4 MG/DL
MCH RBC QN AUTO: 30.9 PG
MCHC RBC AUTO-ENTMCNC: 33.5 G/DL
MCV RBC AUTO: 92 FL
MICROSCOPIC COMMENT: NORMAL
MONOCYTES # BLD AUTO: 0 K/UL
MONOCYTES NFR BLD: 0 %
NEUTROPHILS # BLD AUTO: 0 K/UL
NEUTROPHILS NFR BLD: 0 %
NITRITE UR QL STRIP: NEGATIVE
NRBC BLD-RTO: 0 /100 WBC
NUM UNITS TRANS PACKED RBC: NORMAL
NUM UNITS TRANS WBC-POOR PLATPHERESIS: NORMAL
OVALOCYTES BLD QL SMEAR: ABNORMAL
PH UR STRIP: 7 [PH] (ref 5–8)
PHOSPHATE SERPL-MCNC: 1.7 MG/DL
PLATELET # BLD AUTO: 9 K/UL
PLATELET BLD QL SMEAR: ABNORMAL
PMV BLD AUTO: 9.4 FL
POIKILOCYTOSIS BLD QL SMEAR: SLIGHT
POTASSIUM SERPL-SCNC: 3.9 MMOL/L
PROT SERPL-MCNC: 5.4 G/DL
PROT UR QL STRIP: NEGATIVE
RBC # BLD AUTO: 2.07 M/UL
RBC #/AREA URNS AUTO: 1 /HPF (ref 0–4)
SODIUM SERPL-SCNC: 131 MMOL/L
SP GR UR STRIP: 1.01 (ref 1–1.03)
SQUAMOUS #/AREA URNS AUTO: 1 /HPF
URATE SERPL-MCNC: 1.4 MG/DL
URN SPEC COLLECT METH UR: ABNORMAL
UROBILINOGEN UR STRIP-ACNC: NEGATIVE EU/DL
WBC # BLD AUTO: 0.08 K/UL
WBC #/AREA URNS AUTO: 2 /HPF (ref 0–5)

## 2018-06-11 PROCEDURE — P9037 PLATE PHERES LEUKOREDU IRRAD: HCPCS

## 2018-06-11 PROCEDURE — P9040 RBC LEUKOREDUCED IRRADIATED: HCPCS

## 2018-06-11 PROCEDURE — 36415 COLL VENOUS BLD VENIPUNCTURE: CPT

## 2018-06-11 PROCEDURE — 63600175 PHARM REV CODE 636 W HCPCS: Performed by: NURSE PRACTITIONER

## 2018-06-11 PROCEDURE — 88271 CYTOGENETICS DNA PROBE: CPT | Mod: 59

## 2018-06-11 PROCEDURE — 88185 FLOWCYTOMETRY/TC ADD-ON: CPT | Performed by: PATHOLOGY

## 2018-06-11 PROCEDURE — 88342 IMHCHEM/IMCYTCHM 1ST ANTB: CPT | Mod: 26,59,, | Performed by: PATHOLOGY

## 2018-06-11 PROCEDURE — 85097 BONE MARROW INTERPRETATION: CPT | Mod: ,,, | Performed by: PATHOLOGY

## 2018-06-11 PROCEDURE — 88313 SPECIAL STAINS GROUP 2: CPT

## 2018-06-11 PROCEDURE — 87186 SC STD MICRODIL/AGAR DIL: CPT

## 2018-06-11 PROCEDURE — 88264 CHROMOSOME ANALYSIS 20-25: CPT

## 2018-06-11 PROCEDURE — 86920 COMPATIBILITY TEST SPIN: CPT

## 2018-06-11 PROCEDURE — 25000003 PHARM REV CODE 250: Performed by: INTERNAL MEDICINE

## 2018-06-11 PROCEDURE — 63600175 PHARM REV CODE 636 W HCPCS: Performed by: INTERNAL MEDICINE

## 2018-06-11 PROCEDURE — 84550 ASSAY OF BLOOD/URIC ACID: CPT

## 2018-06-11 PROCEDURE — 88305 TISSUE EXAM BY PATHOLOGIST: CPT | Mod: 26,,, | Performed by: PATHOLOGY

## 2018-06-11 PROCEDURE — 81245 FLT3 GENE: CPT

## 2018-06-11 PROCEDURE — 25000003 PHARM REV CODE 250: Performed by: NURSE PRACTITIONER

## 2018-06-11 PROCEDURE — 87040 BLOOD CULTURE FOR BACTERIA: CPT | Mod: 59

## 2018-06-11 PROCEDURE — 36430 TRANSFUSION BLD/BLD COMPNT: CPT

## 2018-06-11 PROCEDURE — 87077 CULTURE AEROBIC IDENTIFY: CPT | Mod: 59

## 2018-06-11 PROCEDURE — 25000003 PHARM REV CODE 250: Performed by: STUDENT IN AN ORGANIZED HEALTH CARE EDUCATION/TRAINING PROGRAM

## 2018-06-11 PROCEDURE — 88189 FLOWCYTOMETRY/READ 16 & >: CPT | Mod: ,,, | Performed by: PATHOLOGY

## 2018-06-11 PROCEDURE — 88237 TISSUE CULTURE BONE MARROW: CPT

## 2018-06-11 PROCEDURE — 63600175 PHARM REV CODE 636 W HCPCS: Performed by: STUDENT IN AN ORGANIZED HEALTH CARE EDUCATION/TRAINING PROGRAM

## 2018-06-11 PROCEDURE — 87076 CULTURE ANAEROBE IDENT EACH: CPT

## 2018-06-11 PROCEDURE — 20600001 HC STEP DOWN PRIVATE ROOM

## 2018-06-11 PROCEDURE — 87086 URINE CULTURE/COLONY COUNT: CPT

## 2018-06-11 PROCEDURE — 38222 DX BONE MARROW BX & ASPIR: CPT | Mod: RT,,, | Performed by: NURSE PRACTITIONER

## 2018-06-11 PROCEDURE — A4216 STERILE WATER/SALINE, 10 ML: HCPCS | Performed by: INTERNAL MEDICINE

## 2018-06-11 PROCEDURE — 81310 NPM1 GENE: CPT

## 2018-06-11 PROCEDURE — 07DR3ZX EXTRACTION OF ILIAC BONE MARROW, PERCUTANEOUS APPROACH, DIAGNOSTIC: ICD-10-PCS | Performed by: INTERNAL MEDICINE

## 2018-06-11 PROCEDURE — 85025 COMPLETE CBC W/AUTO DIFF WBC: CPT

## 2018-06-11 PROCEDURE — 83735 ASSAY OF MAGNESIUM: CPT

## 2018-06-11 PROCEDURE — 88341 IMHCHEM/IMCYTCHM EA ADD ANTB: CPT | Mod: 26,59,, | Performed by: PATHOLOGY

## 2018-06-11 PROCEDURE — 88184 FLOWCYTOMETRY/ TC 1 MARKER: CPT | Performed by: PATHOLOGY

## 2018-06-11 PROCEDURE — 80053 COMPREHEN METABOLIC PANEL: CPT

## 2018-06-11 PROCEDURE — 99233 SBSQ HOSP IP/OBS HIGH 50: CPT | Mod: ,,, | Performed by: INTERNAL MEDICINE

## 2018-06-11 PROCEDURE — 88341 IMHCHEM/IMCYTCHM EA ADD ANTB: CPT | Performed by: PATHOLOGY

## 2018-06-11 PROCEDURE — 38221 DX BONE MARROW BIOPSIES: CPT

## 2018-06-11 PROCEDURE — 81001 URINALYSIS AUTO W/SCOPE: CPT

## 2018-06-11 PROCEDURE — 88311 DECALCIFY TISSUE: CPT | Mod: 26,,, | Performed by: PATHOLOGY

## 2018-06-11 PROCEDURE — 84100 ASSAY OF PHOSPHORUS: CPT

## 2018-06-11 PROCEDURE — 88305 TISSUE EXAM BY PATHOLOGIST: CPT | Performed by: PATHOLOGY

## 2018-06-11 PROCEDURE — 86901 BLOOD TYPING SEROLOGIC RH(D): CPT

## 2018-06-11 PROCEDURE — 88313 SPECIAL STAINS GROUP 2: CPT | Mod: 26,,, | Performed by: PATHOLOGY

## 2018-06-11 RX ORDER — HYDROCODONE BITARTRATE AND ACETAMINOPHEN 500; 5 MG/1; MG/1
TABLET ORAL
Status: DISCONTINUED | OUTPATIENT
Start: 2018-06-11 | End: 2018-06-12

## 2018-06-11 RX ORDER — FENTANYL CITRATE 50 UG/ML
50 INJECTION, SOLUTION INTRAMUSCULAR; INTRAVENOUS
Status: DISCONTINUED | OUTPATIENT
Start: 2018-06-11 | End: 2018-06-29

## 2018-06-11 RX ORDER — LIDOCAINE HYDROCHLORIDE 20 MG/ML
20 INJECTION, SOLUTION INFILTRATION; PERINEURAL
Status: DISCONTINUED | OUTPATIENT
Start: 2018-06-11 | End: 2018-06-29

## 2018-06-11 RX ORDER — LORAZEPAM 0.5 MG/1
1 TABLET ORAL
Status: DISCONTINUED | OUTPATIENT
Start: 2018-06-11 | End: 2018-06-13

## 2018-06-11 RX ORDER — ONDANSETRON 2 MG/ML
8 INJECTION INTRAMUSCULAR; INTRAVENOUS EVERY 8 HOURS PRN
Status: DISCONTINUED | OUTPATIENT
Start: 2018-06-12 | End: 2018-06-13

## 2018-06-11 RX ORDER — LORAZEPAM 2 MG/ML
0.5 INJECTION INTRAMUSCULAR EVERY 6 HOURS PRN
Status: DISCONTINUED | OUTPATIENT
Start: 2018-06-11 | End: 2018-06-13

## 2018-06-11 RX ORDER — LOPERAMIDE HYDROCHLORIDE 2 MG/1
2 CAPSULE ORAL 4 TIMES DAILY PRN
Status: DISCONTINUED | OUTPATIENT
Start: 2018-06-11 | End: 2018-07-13 | Stop reason: HOSPADM

## 2018-06-11 RX ORDER — CEFEPIME HYDROCHLORIDE 2 G/1
2 INJECTION, POWDER, FOR SOLUTION INTRAVENOUS
Status: DISCONTINUED | OUTPATIENT
Start: 2018-06-11 | End: 2018-06-15

## 2018-06-11 RX ADMIN — DIPHENHYDRAMINE HYDROCHLORIDE 12.5 MG: 50 INJECTION, SOLUTION INTRAMUSCULAR; INTRAVENOUS at 09:06

## 2018-06-11 RX ADMIN — LOPERAMIDE HYDROCHLORIDE 2 MG: 2 CAPSULE ORAL at 11:06

## 2018-06-11 RX ADMIN — Medication 10 ML: at 02:06

## 2018-06-11 RX ADMIN — Medication 400 MG: at 05:06

## 2018-06-11 RX ADMIN — POTASSIUM & SODIUM PHOSPHATES POWDER PACK 280-160-250 MG 2 PACKET: 280-160-250 PACK at 02:06

## 2018-06-11 RX ADMIN — Medication 10 ML: at 09:06

## 2018-06-11 RX ADMIN — CEFEPIME 2 G: 2 INJECTION, POWDER, FOR SOLUTION INTRAVENOUS at 09:06

## 2018-06-11 RX ADMIN — Medication 10 ML: at 06:06

## 2018-06-11 RX ADMIN — SODIUM CHLORIDE 500 ML: 0.9 INJECTION, SOLUTION INTRAVENOUS at 04:06

## 2018-06-11 RX ADMIN — ACETAMINOPHEN 650 MG: 325 TABLET, FILM COATED ORAL at 09:06

## 2018-06-11 RX ADMIN — Medication 10 ML: at 11:06

## 2018-06-11 RX ADMIN — FAMOTIDINE 20 MG: 20 TABLET ORAL at 09:06

## 2018-06-11 RX ADMIN — ALPRAZOLAM 0.25 MG: 0.25 TABLET ORAL at 08:06

## 2018-06-11 RX ADMIN — METOPROLOL SUCCINATE 25 MG: 25 TABLET, EXTENDED RELEASE ORAL at 08:06

## 2018-06-11 RX ADMIN — ACYCLOVIR 400 MG: 200 CAPSULE ORAL at 09:06

## 2018-06-11 RX ADMIN — ONDANSETRON 4 MG: 2 INJECTION INTRAMUSCULAR; INTRAVENOUS at 11:06

## 2018-06-11 RX ADMIN — POTASSIUM & SODIUM PHOSPHATES POWDER PACK 280-160-250 MG 2 PACKET: 280-160-250 PACK at 05:06

## 2018-06-11 RX ADMIN — ACETAMINOPHEN 650 MG: 325 TABLET, FILM COATED ORAL at 03:06

## 2018-06-11 RX ADMIN — VORICONAZOLE 200 MG: 200 TABLET, FILM COATED ORAL at 08:06

## 2018-06-11 RX ADMIN — CEFEPIME 2 G: 2 INJECTION, POWDER, FOR SOLUTION INTRAVENOUS at 02:06

## 2018-06-11 RX ADMIN — Medication 400 MG: at 10:06

## 2018-06-11 RX ADMIN — VORICONAZOLE 200 MG: 200 TABLET, FILM COATED ORAL at 09:06

## 2018-06-11 RX ADMIN — FENTANYL CITRATE 50 MCG: 50 INJECTION, SOLUTION INTRAMUSCULAR; INTRAVENOUS at 02:06

## 2018-06-11 RX ADMIN — ALLOPURINOL 300 MG: 300 TABLET ORAL at 08:06

## 2018-06-11 RX ADMIN — ONDANSETRON 4 MG: 2 INJECTION INTRAMUSCULAR; INTRAVENOUS at 05:06

## 2018-06-11 RX ADMIN — FAMOTIDINE 20 MG: 20 TABLET ORAL at 08:06

## 2018-06-11 RX ADMIN — ONDANSETRON 4 MG: 2 INJECTION INTRAMUSCULAR; INTRAVENOUS at 06:06

## 2018-06-11 RX ADMIN — Medication 2 MG: at 02:06

## 2018-06-11 RX ADMIN — CEFEPIME 2 G: 2 INJECTION, POWDER, FOR SOLUTION INTRAVENOUS at 04:06

## 2018-06-11 RX ADMIN — PROCHLORPERAZINE MALEATE 10 MG: 5 TABLET, FILM COATED ORAL at 02:06

## 2018-06-11 RX ADMIN — POTASSIUM & SODIUM PHOSPHATES POWDER PACK 280-160-250 MG 2 PACKET: 280-160-250 PACK at 10:06

## 2018-06-11 RX ADMIN — ACYCLOVIR 400 MG: 200 CAPSULE ORAL at 08:06

## 2018-06-11 RX ADMIN — Medication 400 MG: at 02:06

## 2018-06-11 RX ADMIN — DIPHENHYDRAMINE HYDROCHLORIDE 12.5 MG: 50 INJECTION, SOLUTION INTRAMUSCULAR; INTRAVENOUS at 04:06

## 2018-06-11 RX ADMIN — LORAZEPAM 1 MG: 0.5 TABLET ORAL at 01:06

## 2018-06-11 RX ADMIN — LIDOCAINE HYDROCHLORIDE 20 ML: 20 INJECTION, SOLUTION INFILTRATION; PERINEURAL at 02:06

## 2018-06-11 RX ADMIN — ESCITALOPRAM 10 MG: 5 TABLET, FILM COATED ORAL at 08:06

## 2018-06-11 RX ADMIN — MORPHINE SULFATE 15 MG: 15 TABLET ORAL at 11:06

## 2018-06-11 RX ADMIN — PROMETHAZINE HYDROCHLORIDE 6.25 MG: 25 INJECTION INTRAMUSCULAR; INTRAVENOUS at 08:06

## 2018-06-11 NOTE — PROGRESS NOTES
06/11/18 0348   Vital Signs   Temp (!) 101.7 °F (38.7 °C)   Temp src Oral   Pulse 90   Heart Rate Source Monitor   Resp 20   SpO2 97 %   O2 Device (Oxygen Therapy) room air   BP (!) 96/46   MAP (mmHg) 66   BP Location Left arm   Patient Position Lying   Dr. Tatum notified of current vitals. Pan cx, CXR, u/a, cefepime ordered. H/h 6.4/19.1 and plt 9 - 1 RBC and 1 unit of platelets ordered. Patient's only urine output for this shift was 150cc.  500cc bolus ordered.. Will continue to monitor.

## 2018-06-11 NOTE — ASSESSMENT & PLAN NOTE
- baseline normal  - elevated bilirubin 0.8, AST 7 and ALT 64 today  - will restart Midostaurin as liver enzymes improved today

## 2018-06-11 NOTE — ASSESSMENT & PLAN NOTE
- T.max 101.7 this am .   - blood cultures no growth, CXR and UA unremarkable.   - Cefepime restarted this am

## 2018-06-11 NOTE — ASSESSMENT & PLAN NOTE
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 Ordered routine labs in addition to FLT3, NGS, and AML FISH.  - lo res HLA typing on 5/26/18 and hi res on 5/27/18 done in anticipation of possible need for future transplant   - Pt with two daughters, two full sisters (in their mid 60s, one with brain aneurysm hx, other one without any medical issues), and one full brother (59 y/o healthy).  - ECHO ordered 5/25/18, EF 65%  - PICC line placed 5/25/18   - initially on hydrea, discontinued on 5/28.   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  - started on induction therapy on 5/29/18.  - Day 14 of induction therapy, tolerating treatment fairly well.    - On allopurinol 300 mg daily, risk for tumor lysis likely low at this point--will stop today (6/11)  - prophylactic antimicrobials: Acyclovir, Cipro, Voriconazole  - will need CNS Prophylaxis given her elevated WBC (high risk) on admission after count recovery.  - NPM1 +, CEBPA (-), FLT3 (+).  On Midostaurin 50 mg PO BID until Day 14 (held starting 6/8 and will restart today 6/11).   - generally tolerating treatment well aside from painful rash which she developed to labia  - will plan for empty bone marrow biopsy today day 14 (6/11)

## 2018-06-11 NOTE — PLAN OF CARE
Problem: Patient Care Overview  Goal: Plan of Care Review  Outcome: Ongoing (interventions implemented as appropriate)  Fall, pressure ulcer, and infection precautions continued. Vital signs stable. One unit of PRBCs given as ordered. Midostaurin restarted. Electrolytes replaced as ordered. BMB completed. Pain, nausea, and anxiety managed with PRN medications. Patient stable, will continue to monitor.

## 2018-06-11 NOTE — PT/OT/SLP PROGRESS
"Occupational Therapy      Patient Name:  Noreen Mac   MRN:  [id number]    Patient not seen today secondary to Patient fatigue. Pt found supine in bed stating she was feeling "nauseous" and politely decline to participate in therapy this date. Pt educated on the importance of OOB mobility and prevention of deconditioning during hospitalization course. Pt verbalized understanding and stated she would attempt to sit UIC later in day. Will follow-up next scheduled OT session .    Lupe Wild OT  6/11/2018  "

## 2018-06-11 NOTE — PLAN OF CARE
Problem: Patient Care Overview  Goal: Plan of Care Review  Outcome: Ongoing (interventions implemented as appropriate)  Patient remained free from falls throughout shift, call bell within reach. Day 13 of 7+3. Tmax 101.7 overnight. See previous note.1 unit plt transfused, awaiting 1 RBC from blood bank currently. Prn electrolytes replaced as ordered. Complained of abdominal pain once, prn morphine given. Scheduled zofran in addition to prn reglan given for nausea. Vitals currently stable, will continue to monitor.

## 2018-06-11 NOTE — SUBJECTIVE & OBJECTIVE
Subjective:     Interval History:   - Day 14 of induction chemotherapy  - restarting Midostaurin today, liver enzymes improved  - T.max 101.7 at 4 am , cefepime started, UA-, chest x-ray -, BC pending  - reports nausea on scheduled Zofran  - labial rash is improving    Objective:     Vital Signs (Most Recent):  Temp: 99.1 °F (37.3 °C) (06/11/18 1156)  Pulse: 91 (06/11/18 1156)  Resp: 20 (06/11/18 1156)  BP: (!) 102/51 (06/11/18 1156)  SpO2: 96 % (06/11/18 1156) Vital Signs (24h Range):  Temp:  [97.4 °F (36.3 °C)-101.7 °F (38.7 °C)] 99.1 °F (37.3 °C)  Pulse:  [] 91  Resp:  [16-24] 20  SpO2:  [93 %-97 %] 96 %  BP: ()/(46-56) 102/51     Weight: 70.1 kg (154 lb 8.7 oz)  Body mass index is 27.38 kg/m².  Body surface area is 1.77 meters squared.      Intake/Output - Last 3 Shifts       06/09 0700 - 06/10 0659 06/10 0700 - 06/11 0659 06/11 0700 - 06/12 0659    P.O. 2250 1490     I.V. (mL/kg) 1920 (27.6) 3126.7 (44.6)     Blood 125  350    IV Piggyback  50 50    Total Intake(mL/kg) 4295 (61.7) 4666.7 (66.6) 400 (5.7)    Urine (mL/kg/hr) 1300 (0.8) 950 (0.6) 150 (0.3)    Emesis/NG output  400 (0.2)     Stool  600 (0.4)     Total Output 1300 1950 150    Net +2995 +2716.7 +250           Stool Occurrence 1 x 1 x 1 x    Emesis Occurrence  1 x           Physical Exam   Constitutional: She is oriented to person, place, and time. She appears well-developed and well-nourished. No distress.   HENT:   Head: Normocephalic and atraumatic.   Eyes: Pupils are equal, round, and reactive to light.   Neck: Normal range of motion.   Cardiovascular: Normal rate and regular rhythm.    No murmur heard.  Pulmonary/Chest: Effort normal and breath sounds normal. No respiratory distress.   Abdominal: Soft. Bowel sounds are normal. There is no tenderness.   Genitourinary:   Genitourinary Comments: + labial erythematous, maculopapular rash.    Musculoskeletal: Normal range of motion. She exhibits no edema.   Neurological: She is alert  and oriented to person, place, and time.   Skin: Skin is warm and dry.   Vitals reviewed.      Significant Labs:   CBC:   Recent Labs  Lab 06/10/18  0331 06/11/18  0308   WBC 0.12* 0.08*   HGB 7.6* 6.4*   HCT 22.4* 19.1*   PLT 5* 9*    and CMP:   Recent Labs  Lab 06/10/18  0331 06/11/18  0308   * 131*   K 4.1 3.9    105   CO2 20* 20*   * 117*   BUN 13 13   CREATININE 0.8 0.7   CALCIUM 7.4* 7.4*   PROT 6.1 5.4*   ALBUMIN 2.6* 2.3*   BILITOT 1.2* 0.8   ALKPHOS 493* 336*   AST 26 7*   * 64*   ANIONGAP 9 6*   EGFRNONAA >60.0 >60.0       Diagnostic Results:  I have reviewed all pertinent imaging results/findings within the past 24 hours.

## 2018-06-11 NOTE — PLAN OF CARE
Notified by the [facility] the 6/10/18 stool specimen is negative for C diff.  Isolation may be discontinued.

## 2018-06-11 NOTE — PROCEDURES
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  Timeout: Done and documented.  Site: Right posterior illiac crest.  Position: Prone   Prep: Betadine.  Needle used: 11 gauge Jamshidi needle.  Anesthetic: 1% lidocaine 14 cc.  Biopsy: The biopsy needle was introduced into the marrow cavity and an aspirate was obtained without complications and sent for flow cytometry, AML FISH, FLT3, NPM1, and cytogenetics. Core biopsy obtained without difficulty and sent for routine histologic examination.  Complications: None.  Disposition: Inpatient. RN to assess BMBX site for bleeding. Patient to lie flat on back x30 minutes.   Minimal blood loss    Aminata Mckenzie DNP, FNP  Hematology/Bone Marrow Transplant

## 2018-06-11 NOTE — ASSESSMENT & PLAN NOTE
- secondary to AML and chemotherapy.   - wbc: 80/mm3 with ANC: 0, Hb: 6.4 g/dL, and Plt: 9,000/mm3.   - will transfuse 1 unit of PRBC and platelets today  - tranfuse if Hb < 7 g/dL and Plt < 10,000/mm3 or active bleed.

## 2018-06-11 NOTE — PROGRESS NOTES
SW met with pt at bedside to provide support, no needs identified. SW will continue to follow.    Radha Urias, [facility]  Oncology Social Worker  Phone: [phone]

## 2018-06-11 NOTE — PROGRESS NOTES
[facility]  Hematology  Bone Marrow Transplant  Progress Note    Patient Name: Noreen Mac  Admission Date: 5/24/2018  Hospital Length of Stay: 18 days  Code Status: Full Code    Subjective:     Interval History:   - Day 14 of induction chemotherapy  - restarting Midostaurin today, liver enzymes improved  - T.max 101.7 at 4 am , cefepime started, UA-, chest x-ray -, BC pending  - reports nausea on scheduled Zofran  - labial rash is improving    Objective:     Vital Signs (Most Recent):  Temp: 99.1 °F (37.3 °C) (06/11/18 1156)  Pulse: 91 (06/11/18 1156)  Resp: 20 (06/11/18 1156)  BP: (!) 102/51 (06/11/18 1156)  SpO2: 96 % (06/11/18 1156) Vital Signs (24h Range):  Temp:  [97.4 °F (36.3 °C)-101.7 °F (38.7 °C)] 99.1 °F (37.3 °C)  Pulse:  [] 91  Resp:  [16-24] 20  SpO2:  [93 %-97 %] 96 %  BP: ()/(46-56) 102/51     Weight: 70.1 kg (154 lb 8.7 oz)  Body mass index is 27.38 kg/m².  Body surface area is 1.77 meters squared.      Intake/Output - Last 3 Shifts       06/09 0700 - 06/10 0659 06/10 0700 - 06/11 0659 06/11 0700 - 06/12 0659    P.O. 2250 1490     I.V. (mL/kg) 1920 (27.6) 3126.7 (44.6)     Blood 125  350    IV Piggyback  50 50    Total Intake(mL/kg) 4295 (61.7) 4666.7 (66.6) 400 (5.7)    Urine (mL/kg/hr) 1300 (0.8) 950 (0.6) 150 (0.3)    Emesis/NG output  400 (0.2)     Stool  600 (0.4)     Total Output 1300 1950 150    Net +2995 +2716.7 +250           Stool Occurrence 1 x 1 x 1 x    Emesis Occurrence  1 x           Physical Exam   Constitutional: She is oriented to person, place, and time. She appears well-developed and well-nourished. No distress.   HENT:   Head: Normocephalic and atraumatic.   Eyes: Pupils are equal, round, and reactive to light.   Neck: Normal range of motion.   Cardiovascular: Normal rate and regular rhythm.    No murmur heard.  Pulmonary/Chest: Effort normal and breath sounds normal. No respiratory distress.   Abdominal: Soft. Bowel sounds are normal. There  is no tenderness.   Genitourinary:   Genitourinary Comments: + labial erythematous, maculopapular rash.    Musculoskeletal: Normal range of motion. She exhibits no edema.   Neurological: She is alert and oriented to person, place, and time.   Skin: Skin is warm and dry.   Vitals reviewed.      Significant Labs:   CBC:   Recent Labs  Lab 06/10/18  0331 06/11/18  0308   WBC 0.12* 0.08*   HGB 7.6* 6.4*   HCT 22.4* 19.1*   PLT 5* 9*    and CMP:   Recent Labs  Lab 06/10/18  0331 06/11/18  0308   * 131*   K 4.1 3.9    105   CO2 20* 20*   * 117*   BUN 13 13   CREATININE 0.8 0.7   CALCIUM 7.4* 7.4*   PROT 6.1 5.4*   ALBUMIN 2.6* 2.3*   BILITOT 1.2* 0.8   ALKPHOS 493* 336*   AST 26 7*   * 64*   ANIONGAP 9 6*   EGFRNONAA >60.0 >60.0       Diagnostic Results:  I have reviewed all pertinent imaging results/findings within the past 24 hours.    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 Ordered routine labs in addition to FLT3, NGS, and AML FISH.  - lo res HLA typing on 5/26/18 and hi res on 5/27/18 done in anticipation of possible need for future transplant   - Pt with two daughters, two full sisters (in their mid 60s, one with brain aneurysm hx, other one without any medical issues), and one full brother (61 y/o healthy).  - ECHO ordered 5/25/18, EF 65%  - PICC line placed 5/25/18   - initially on hydrea, discontinued on 5/28.   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  - started on induction therapy on 5/29/18.  - Day 14 of induction therapy, tolerating treatment fairly well.    - On allopurinol 300 mg daily, risk for tumor lysis likely low at this point--will stop today (6/11)  - prophylactic antimicrobials: Acyclovir, Cipro, Voriconazole  - will need  CNS Prophylaxis given her elevated WBC (high risk) on admission after count recovery.  - NPM1 +, CEBPA (-), FLT3 (+).  On Midostaurin 50 mg PO BID until Day 14 (held starting 6/8 and will restart today 6/11).   - generally tolerating treatment well aside from painful rash which she developed to labia  - will plan for empty bone marrow biopsy today day 14 (6/11)          Abnormal liver enzymes    - baseline normal  - elevated bilirubin 0.8, AST 7 and ALT 64 today  - will restart Midostaurin as liver enzymes improved today        Hypophosphatemia    - phos 1.7 today  - replace per electrolyte protocol        Hypomagnesemia    - mag 1.4 today  - replace per electrolyte replacement protocol        Vaginal mucositis    - Rash appears smaller in area, less painful per patient. She reports that Clabetasol cream makes irritation worse so it was discontinued  - increase frequency of morphine to 2 mg IV Q3 hrs        Pancytopenia    - secondary to AML and chemotherapy.   - wbc: 80/mm3 with ANC: 0, Hb: 6.4 g/dL, and Plt: 9,000/mm3.   - will transfuse 1 unit of PRBC and platelets today  - tranfuse if Hb < 7 g/dL and Plt < 10,000/mm3 or active bleed.           MCTD (mixed connective tissue disease)    - MCTD-NOS  - previously on MTX and Prednisone.   - off therapy now and otherwise doing well.   - can follow up outpatient with her Rheumatologist upon discharge.         Bilateral headaches    - complained of bilateral temporal headaches earlier in hospitalization course  - similar to ones on presentation at OSH  - CT Head done at OSH was unremarkable.   - will consider re-imaging if deemed necessary.   - no complaints of headache today  - PRN Tylenol for now.           Neutropenic fever    - T.max 101.7 this am .   - blood cultures no growth, CXR and UA unremarkable.   - Cefepime restarted this am           Pulmonary nodule    - CXR suggestive of 7 mm slightly irregular shaped radiodensity projected over the left upper lung zone  which could represent a calcified granuloma but remains indeterminate.  - CT Chest on 5/28: Multiple scattered noncalcified pulmonary granulomas, the largest measuring 8 mm in the lateral segment of the right middle lobe.   - repeat non-contrast chest CT at 6-12 months         Insomnia    - PRN nightly Xanax.         HTN (hypertension)    - history of SVT and reports intermittent palpitations at times.  - EKG on 5/28 - NSR.  - increased metoprolol to 25 mg QD,she feels better with this dose.        Electrolyte abnormality    - will replace electrolytes as needed.  - Mg Sulfate 2 gm IV for Mg of 1.4 this AM          GERD (gastroesophageal reflux disease)    - takes Omeprazole at home, now on pepcid here  - no issue         Osteoporosis    - takes prolia once every 6 months, last given mid April 2018   - no acute issue         Depression    - no acute issue   - continue home Escitalopram             VTE Risk Mitigation         Ordered     heparin, porcine (PF) 100 unit/mL injection flush 300 Units  As needed (PRN)      05/29/18 1024     Place MAYLIN hose  Until discontinued      05/25/18 0032     IP VTE HIGH RISK PATIENT  Once      05/25/18 0032          Disposition: pending day 14 bone marrow biopsy results and count recovery post chemo    Sweetie Lepe, NP  Bone Marrow Transplant  [facility]

## 2018-06-11 NOTE — PROGRESS NOTES
[facility]  Hematology  Bone Marrow Transplant  Progress Note    Patient Name: Noreen Mac  Admission Date: 5/24/2018  Hospital Length of Stay: 18 days  Code Status: Full Code     Subjective:     Interval History:   - Day 13 of induction chemotherapy  - nausea controlled with prn medications  - Rash is improving    Objective:     Vital Signs (Most Recent):  Temp: 98.9 °F (37.2 °C) (06/11/18 0641)  Pulse: 94 (06/11/18 0630)  Resp: 18 (06/11/18 0641)  BP: (!) 98/51 (06/11/18 0641)  SpO2: 95 % (06/11/18 0630) Vital Signs (24h Range):  Temp:  [97.4 °F (36.3 °C)-101.7 °F (38.7 °C)] 98.9 °F (37.2 °C)  Pulse:  [78-94] 94  Resp:  [16-20] 18  SpO2:  [93 %-97 %] 95 %  BP: ()/(46-57) 98/51     Weight: 70.1 kg (154 lb 8.7 oz)  Body mass index is 27.38 kg/m².  Body surface area is 1.77 meters squared.    ECOG SCORE         [unfilled]    Intake/Output - Last 3 Shifts       06/09 0700 - 06/10 0659 06/10 0700 - 06/11 0659 06/11 0700 - 06/12 0659    P.O. 2250 1490     I.V. (mL/kg) 1920 (27.6) 3126.7 (44.6)     Blood 125      IV Piggyback  50     Total Intake(mL/kg) 4295 (61.7) 4666.7 (66.6)     Urine (mL/kg/hr) 1300 (0.8) 950 (0.6)     Emesis/NG output  400 (0.2)     Stool  600 (0.4)     Total Output 1300 1950      Net +2995 +2716.7             Stool Occurrence 1 x 1 x     Emesis Occurrence  1 x           Physical Exam   Constitutional: She is oriented to person, place, and time. She appears well-developed and well-nourished. No distress.   HENT:   Head: Normocephalic and atraumatic.   Mouth/Throat: Mucous membranes are normal. No oral lesions.   Eyes: Conjunctivae are normal.   Neck: No thyromegaly present.   Cardiovascular: Normal rate, regular rhythm and normal heart sounds.    No murmur heard.  Pulmonary/Chest: Breath sounds normal. She has no wheezes. She has no rales.   Abdominal: Soft. She exhibits no distension and no mass. There is no splenomegaly or hepatomegaly. There is no tenderness.    Lymphadenopathy:     She has no cervical adenopathy.        Right cervical: No deep cervical adenopathy present.       Left cervical: No deep cervical adenopathy present.     She has no axillary adenopathy.        Right: No inguinal adenopathy present.        Left: No inguinal adenopathy present.   Neurological: She is alert and oriented to person, place, and time. She has normal strength and normal reflexes. No cranial nerve deficit. Coordination normal.   Skin: No rash noted.     Constitutional: She is oriented to person, place, and time. She appears well-developed and well-nourished. No distress.   HENT:   Head: Normocephalic and atraumatic.   Eyes: Pupils are equal, round, and reactive to light.   Neck: Normal range of motion.   Cardiovascular: Normal rate and regular rhythm.    No murmur heard.  Pulmonary/Chest: Effort normal and breath sounds normal. No respiratory distress.   Abdominal: Soft. Bowel sounds are normal. There is no tenderness.   Genitourinary:   Genitourinary Comments: + labial erythematous, maculopapular rash.    Musculoskeletal: Normal range of motion. She exhibits no edema.   Neurological: She is alert and oriented to person, place, and time.   Skin: Skin is warm and dry.   Vitals reviewed.    Significant Labs:   CBC:     Recent Labs  Lab 06/10/18  0331 06/11/18  0308   WBC 0.12* 0.08*   HGB 7.6* 6.4*   HCT 22.4* 19.1*   PLT 5* 9*    and CMP:     Recent Labs  Lab 06/10/18  0331 06/11/18  0308   * 131*   K 4.1 3.9    105   CO2 20* 20*   * 117*   BUN 13 13   CREATININE 0.8 0.7   CALCIUM 7.4* 7.4*   PROT 6.1 5.4*   ALBUMIN 2.6* 2.3*   BILITOT 1.2* 0.8   ALKPHOS 493* 336*   AST 26 7*   * 64*   ANIONGAP 9 6*   EGFRNONAA >60.0 >60.0       Diagnostic Results:  I have reviewed all pertinent imaging results/findings within the past 24 hours.    Assessment/Plan:     Active Diagnoses:    Diagnosis Date Noted POA    PRINCIPAL PROBLEM:  Acute monocytic leukemia not having  achieved remission [C93.00] 05/24/2018 Yes    Hypomagnesemia [E83.42] 06/08/2018 Unknown    Hypophosphatemia [E83.39] 06/08/2018 Unknown    Abnormal liver enzymes [R74.8] 06/08/2018 Unknown    Vaginal mucositis [N76.81] 06/05/2018 No    Pancytopenia [D61.818] 05/30/2018 Unknown    Bilateral headaches [R51] 05/29/2018 Unknown    MCTD (mixed connective tissue disease) [M35.1] 05/29/2018 Unknown    Insomnia [G47.00] 05/28/2018 Unknown    Pulmonary nodule [R91.1] 05/28/2018 Unknown    Neutropenic fever [D70.9, R50.81] 05/28/2018 No    Depression [F32.9] 05/25/2018 Yes    Osteoporosis [M81.0] 05/25/2018 Yes    GERD (gastroesophageal reflux disease) [K21.9] 05/25/2018 Yes    HTN (hypertension) [I10] 05/25/2018 Unknown      Problems Resolved During this Admission:    Diagnosis Date Noted Date Resolved POA    Alteration in electrolyte and fluid balance [E87.8] 05/29/2018 06/08/2018 Unknown    Rash of genital area [R21] 05/25/2018 06/08/2018 Unknown       1. Acute monocytic Leukemia not having achieved remission  Admitted from [facility] with WBC of 100+. Blasts in periphery concerning for non-M3 AML. Bone marrow biopsy and aspiration on 5/25/18 confirms diagnosis. Started on Hydrea  - low res and high res HLA typing sent  - NPM1+, FLT3+. She has been prescribed Midostaurin, but on hold due to elevated liver enzymes  - Day 13 of induction today  - will get Day 14 biopsy on Monday 6/11  - prophylactic Acyclovir, Cipro, Voriconazole  - will need CNS Prophylaxis    2. Chemotherapy induced nausea:  - scheduled ondansetron  - prn phenergan    3. Abnormal liver enzymes:  - significant rise in liver enzymes with start of Midostaurin  - Midostaurin on hold    4. Vaginal Mucositis:  Rash improving  - continue prn pain medication    5. Pancytopenia secondary to anti-neoplastic chemotherapy:  - ANC 0, hemoglobin 7.6, Platelets 5k this AM  - will transfuse for hemoglobin < 7 and platelets <10k    VTE Risk Mitigation          Ordered     heparin, porcine (PF) 100 unit/mL injection flush 300 Units  As needed (PRN)      05/29/18 1024     Place MAYLIN hose  Until discontinued      05/25/18 0032     IP VTE HIGH RISK PATIENT  Once      05/25/18 0032            Disposition: Continue to monitor counts daily. Transfusion support as neccessary. Plan for Bone marrow biopsy on Monday    Jacob Lugo MD  Bone Marrow Transplant  [facility]

## 2018-06-12 PROBLEM — E87.8 ELECTROLYTE ABNORMALITY: Status: RESOLVED | Noted: 2018-05-25 | Resolved: 2018-06-12

## 2018-06-12 LAB
ALBUMIN SERPL BCP-MCNC: 2.1 G/DL
ALP SERPL-CCNC: 298 U/L
ALT SERPL W/O P-5'-P-CCNC: 38 U/L
AML FISH ADDITIONAL INFORMATION (BM): NORMAL
AML FISH DISCLAIMER (BM): NORMAL
AML FISH REASON FOR REFERRAL (BM): NORMAL
AML FISH RELEASED BY (BM): NORMAL
AML FISH RESULT (BM): NORMAL
AML FISH RESULT SUMMARY (BM): NORMAL
AML FISH RESULT TABLE (BM): NORMAL
ANION GAP SERPL CALC-SCNC: 7 MMOL/L
ANISOCYTOSIS BLD QL SMEAR: SLIGHT
AST SERPL-CCNC: 6 U/L
BACTERIA UR CULT: NORMAL
BACTERIA UR CULT: NORMAL
BASOPHILS # BLD AUTO: 0 K/UL
BASOPHILS NFR BLD: 0 %
BILIRUB SERPL-MCNC: 1.1 MG/DL
BLD PROD TYP BPU: NORMAL
BLOOD UNIT EXPIRATION DATE: NORMAL
BLOOD UNIT TYPE CODE: 6200
BLOOD UNIT TYPE: NORMAL
BONE MARROW IRON STAIN COMMENT: NORMAL
BONE MARROW WRIGHT STAIN COMMENT: NORMAL
BUN SERPL-MCNC: 17 MG/DL
CALCIUM SERPL-MCNC: 7.3 MG/DL
CHLORIDE SERPL-SCNC: 105 MMOL/L
CLINICAL CYTOGENETICIST REVIEW: NORMAL
CO2 SERPL-SCNC: 21 MMOL/L
CODING SYSTEM: NORMAL
CREAT SERPL-MCNC: 0.8 MG/DL
DIFFERENTIAL METHOD: ABNORMAL
DISPENSE STATUS: NORMAL
EOSINOPHIL # BLD AUTO: 0 K/UL
EOSINOPHIL NFR BLD: 0 %
ERYTHROCYTE [DISTWIDTH] IN BLOOD BY AUTOMATED COUNT: 16.1 %
EST. GFR  (AFRICAN AMERICAN): >60 ML/MIN/1.73 M^2
EST. GFR  (NON AFRICAN AMERICAN): >60 ML/MIN/1.73 M^2
FAMLB SPECIMEN: NORMAL
GLUCOSE SERPL-MCNC: 92 MG/DL
HCT VFR BLD AUTO: 19.7 %
HGB BLD-MCNC: 6.9 G/DL
HYPOCHROMIA BLD QL SMEAR: ABNORMAL
IMM GRANULOCYTES # BLD AUTO: 0 K/UL
IMM GRANULOCYTES NFR BLD AUTO: 0 %
LYMPHOCYTES # BLD AUTO: 0.1 K/UL
LYMPHOCYTES NFR BLD: 100 %
MAGNESIUM SERPL-MCNC: 1.5 MG/DL
MCH RBC QN AUTO: 31.2 PG
MCHC RBC AUTO-ENTMCNC: 35 G/DL
MCV RBC AUTO: 89 FL
MONOCYTES # BLD AUTO: 0 K/UL
MONOCYTES NFR BLD: 0 %
NEUTROPHILS # BLD AUTO: 0 K/UL
NEUTROPHILS NFR BLD: 0 %
NRBC BLD-RTO: 0 /100 WBC
NUM UNITS TRANS PACKED RBC: NORMAL
OVALOCYTES BLD QL SMEAR: ABNORMAL
PHOSPHATE SERPL-MCNC: 2.4 MG/DL
PLATELET # BLD AUTO: 28 K/UL
PMV BLD AUTO: 10.1 FL
POIKILOCYTOSIS BLD QL SMEAR: SLIGHT
POLYCHROMASIA BLD QL SMEAR: ABNORMAL
POTASSIUM SERPL-SCNC: 3.8 MMOL/L
PROT SERPL-MCNC: 5.3 G/DL
RBC # BLD AUTO: 2.21 M/UL
REF LAB TEST METHOD: NORMAL
SODIUM SERPL-SCNC: 133 MMOL/L
SPECIMEN SOURCE: NORMAL
WBC # BLD AUTO: 0.09 K/UL

## 2018-06-12 PROCEDURE — 36430 TRANSFUSION BLD/BLD COMPNT: CPT

## 2018-06-12 PROCEDURE — 25000003 PHARM REV CODE 250: Performed by: INTERNAL MEDICINE

## 2018-06-12 PROCEDURE — 99233 SBSQ HOSP IP/OBS HIGH 50: CPT | Mod: ,,, | Performed by: INTERNAL MEDICINE

## 2018-06-12 PROCEDURE — 85025 COMPLETE CBC W/AUTO DIFF WBC: CPT

## 2018-06-12 PROCEDURE — 63600175 PHARM REV CODE 636 W HCPCS: Performed by: STUDENT IN AN ORGANIZED HEALTH CARE EDUCATION/TRAINING PROGRAM

## 2018-06-12 PROCEDURE — 25000003 PHARM REV CODE 250: Performed by: HOSPITALIST

## 2018-06-12 PROCEDURE — 97803 MED NUTRITION INDIV SUBSEQ: CPT

## 2018-06-12 PROCEDURE — 27201040 HC RC 50 FILTER

## 2018-06-12 PROCEDURE — 80053 COMPREHEN METABOLIC PANEL: CPT

## 2018-06-12 PROCEDURE — 97530 THERAPEUTIC ACTIVITIES: CPT

## 2018-06-12 PROCEDURE — 84100 ASSAY OF PHOSPHORUS: CPT

## 2018-06-12 PROCEDURE — P9038 RBC IRRADIATED: HCPCS

## 2018-06-12 PROCEDURE — 25000003 PHARM REV CODE 250: Performed by: NURSE PRACTITIONER

## 2018-06-12 PROCEDURE — 63600175 PHARM REV CODE 636 W HCPCS: Performed by: NURSE PRACTITIONER

## 2018-06-12 PROCEDURE — 63600175 PHARM REV CODE 636 W HCPCS: Performed by: INTERNAL MEDICINE

## 2018-06-12 PROCEDURE — 25000003 PHARM REV CODE 250: Performed by: STUDENT IN AN ORGANIZED HEALTH CARE EDUCATION/TRAINING PROGRAM

## 2018-06-12 PROCEDURE — 83735 ASSAY OF MAGNESIUM: CPT

## 2018-06-12 PROCEDURE — 20600001 HC STEP DOWN PRIVATE ROOM

## 2018-06-12 PROCEDURE — A4216 STERILE WATER/SALINE, 10 ML: HCPCS | Performed by: INTERNAL MEDICINE

## 2018-06-12 RX ORDER — SODIUM CHLORIDE 9 MG/ML
INJECTION, SOLUTION INTRAVENOUS CONTINUOUS
Status: DISCONTINUED | OUTPATIENT
Start: 2018-06-12 | End: 2018-07-01

## 2018-06-12 RX ORDER — HYDROCODONE BITARTRATE AND ACETAMINOPHEN 500; 5 MG/1; MG/1
TABLET ORAL
Status: DISCONTINUED | OUTPATIENT
Start: 2018-06-12 | End: 2018-06-13

## 2018-06-12 RX ORDER — VANCOMYCIN/0.9 % SOD CHLORIDE 1 G/100 ML
1000 PLASTIC BAG, INJECTION (ML) INTRAVENOUS
Status: DISCONTINUED | OUTPATIENT
Start: 2018-06-12 | End: 2018-06-17

## 2018-06-12 RX ADMIN — Medication 10 ML: at 05:06

## 2018-06-12 RX ADMIN — Medication 1 G: at 10:06

## 2018-06-12 RX ADMIN — VORICONAZOLE 200 MG: 200 TABLET, FILM COATED ORAL at 09:06

## 2018-06-12 RX ADMIN — ESCITALOPRAM 10 MG: 5 TABLET, FILM COATED ORAL at 08:06

## 2018-06-12 RX ADMIN — Medication 400 MG: at 03:06

## 2018-06-12 RX ADMIN — SODIUM CHLORIDE: 0.9 INJECTION, SOLUTION INTRAVENOUS at 11:06

## 2018-06-12 RX ADMIN — POTASSIUM & SODIUM PHOSPHATES POWDER PACK 280-160-250 MG 1 PACKET: 280-160-250 PACK at 11:06

## 2018-06-12 RX ADMIN — SODIUM CHLORIDE: 0.9 INJECTION, SOLUTION INTRAVENOUS at 05:06

## 2018-06-12 RX ADMIN — MORPHINE SULFATE 15 MG: 15 TABLET ORAL at 11:06

## 2018-06-12 RX ADMIN — ACETAMINOPHEN 650 MG: 325 TABLET, FILM COATED ORAL at 05:06

## 2018-06-12 RX ADMIN — Medication 400 MG: at 07:06

## 2018-06-12 RX ADMIN — ALPRAZOLAM 0.25 MG: 0.25 TABLET ORAL at 05:06

## 2018-06-12 RX ADMIN — FAMOTIDINE 20 MG: 20 TABLET ORAL at 08:06

## 2018-06-12 RX ADMIN — Medication 10 ML: at 01:06

## 2018-06-12 RX ADMIN — POTASSIUM & SODIUM PHOSPHATES POWDER PACK 280-160-250 MG 1 PACKET: 280-160-250 PACK at 03:06

## 2018-06-12 RX ADMIN — DIPHENHYDRAMINE HYDROCHLORIDE 12.5 MG: 50 INJECTION, SOLUTION INTRAMUSCULAR; INTRAVENOUS at 05:06

## 2018-06-12 RX ADMIN — CALCIUM CARBONATE (ANTACID) CHEW TAB 500 MG 500 MG: 500 CHEW TAB at 12:06

## 2018-06-12 RX ADMIN — PROCHLORPERAZINE MALEATE 10 MG: 5 TABLET, FILM COATED ORAL at 08:06

## 2018-06-12 RX ADMIN — Medication 10 ML: at 12:06

## 2018-06-12 RX ADMIN — ALPRAZOLAM 0.25 MG: 0.25 TABLET ORAL at 11:06

## 2018-06-12 RX ADMIN — Medication 10 ML: at 06:06

## 2018-06-12 RX ADMIN — RAMELTEON 8 MG: 8 TABLET, FILM COATED ORAL at 09:06

## 2018-06-12 RX ADMIN — CEFEPIME 2 G: 2 INJECTION, POWDER, FOR SOLUTION INTRAVENOUS at 01:06

## 2018-06-12 RX ADMIN — CALCIUM CARBONATE (ANTACID) CHEW TAB 500 MG 500 MG: 500 CHEW TAB at 09:06

## 2018-06-12 RX ADMIN — ACYCLOVIR 400 MG: 200 CAPSULE ORAL at 09:06

## 2018-06-12 RX ADMIN — Medication 400 MG: at 11:06

## 2018-06-12 RX ADMIN — Medication 10 ML: at 09:06

## 2018-06-12 RX ADMIN — POTASSIUM & SODIUM PHOSPHATES POWDER PACK 280-160-250 MG 2 PACKET: 280-160-250 PACK at 08:06

## 2018-06-12 RX ADMIN — CEFEPIME 2 G: 2 INJECTION, POWDER, FOR SOLUTION INTRAVENOUS at 09:06

## 2018-06-12 RX ADMIN — LOPERAMIDE HYDROCHLORIDE 2 MG: 2 CAPSULE ORAL at 10:06

## 2018-06-12 RX ADMIN — SODIUM CHLORIDE 500 ML: 0.9 INJECTION, SOLUTION INTRAVENOUS at 10:06

## 2018-06-12 RX ADMIN — ACYCLOVIR 400 MG: 200 CAPSULE ORAL at 08:06

## 2018-06-12 RX ADMIN — CEFEPIME 2 G: 2 INJECTION, POWDER, FOR SOLUTION INTRAVENOUS at 04:06

## 2018-06-12 RX ADMIN — VORICONAZOLE 200 MG: 200 TABLET, FILM COATED ORAL at 08:06

## 2018-06-12 RX ADMIN — POTASSIUM & SODIUM PHOSPHATES POWDER PACK 280-160-250 MG 1 PACKET: 280-160-250 PACK at 07:06

## 2018-06-12 NOTE — ASSESSMENT & PLAN NOTE
- will replace electrolytes as needed.  - Mg Sulfate 2 gm IV for Mg of 1.4 this AM     Pt arrived to IR room 125 for para, no acute distress noted. Orders and labs reviewed on chart. Awaiting consent.

## 2018-06-12 NOTE — PROGRESS NOTES
[facility]  Hematology  Bone Marrow Transplant  Progress Note    Patient Name: Noreen Mac  Admission Date: 5/24/2018  Hospital Length of Stay: 19 days  Code Status: Full Code    Subjective:     Interval History:   - Day 15 of induction chemotherapy  - Midostaurin restarted 6/11, liver enzymes improved  - afebrile since 6/11 at 4pm.   - BC with gram + cocci resembling staph  - cefepime day 2 and will start Vancomycin  - nausea controlled on scheduled Zofran  - labial rash is improving  - low BP this am, BP meds held and given 500 cc bolus       Objective:     Vital Signs (Most Recent):  Temp: 98.9 °F (37.2 °C) (06/12/18 0728)  Pulse: 80 (06/12/18 0728)  Resp: 16 (06/12/18 0728)  BP: (!) 91/52 (06/12/18 0624)  SpO2: 95 % (06/12/18 0728) Vital Signs (24h Range):  Temp:  [98.2 °F (36.8 °C)-100.9 °F (38.3 °C)] 98.9 °F (37.2 °C)  Pulse:  [80-94] 80  Resp:  [16-20] 16  SpO2:  [91 %-96 %] 95 %  BP: ()/(48-55) 91/52     Weight: 70.1 kg (154 lb 8.7 oz)  Body mass index is 27.38 kg/m².  Body surface area is 1.77 meters squared.      Intake/Output - Last 3 Shifts       06/10 0700 - 06/11 0659 06/11 0700 - 06/12 0659 06/12 0700 - 06/13 0659    P.O. 1490 720     I.V. (mL/kg) 3126.7 (44.6)      Blood  350     IV Piggyback 50 50     Total Intake(mL/kg) 4666.7 (66.6) 1120 (16)     Urine (mL/kg/hr) 950 (0.6) 600 (0.4) 350 (2.4)    Emesis/NG output 400 (0.2)      Stool 600 (0.4)      Total Output 1950 600 350    Net +2716.7 +520 -350           Stool Occurrence 1 x 1 x     Emesis Occurrence 1 x            Physical Exam   Constitutional: She is oriented to person, place, and time. She appears well-developed and well-nourished. No distress.   HENT:   Head: Normocephalic and atraumatic.   Eyes: Pupils are equal, round, and reactive to light.   Neck: Normal range of motion.   Cardiovascular: Normal rate and regular rhythm.    No murmur heard.  Pulmonary/Chest: Effort normal and breath sounds normal. No  respiratory distress.   Abdominal: Soft. Bowel sounds are normal. There is no tenderness.   Genitourinary:   Genitourinary Comments: + labial erythematous, maculopapular rash.    Musculoskeletal: Normal range of motion. She exhibits no edema.   Neurological: She is alert and oriented to person, place, and time.   Skin: Skin is warm and dry.   Vitals reviewed.      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 Ordered routine labs in addition to FLT3, NGS, and AML FISH.  - lo res HLA typing on 5/26/18 and hi res on 5/27/18 done in anticipation of possible need for future transplant   - Pt with two daughters, two full sisters (in their mid 60s, one with brain aneurysm hx, other one without any medical issues), and one full brother (59 y/o healthy).  - ECHO ordered 5/25/18, EF 65%  - PICC line placed 5/25/18   - initially on hydrea, discontinued on 5/28.   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  - started on induction therapy on 5/29/18.  - Day 15 of induction therapy, tolerating treatment fairly well.    - allopurinol stopped 6/11  - prophylactic antimicrobials: Acyclovir, Voriconazole  - will need CNS Prophylaxis given her elevated WBC (high risk) on admission after count  recovery.  - NPM1 +, CEBPA (-), FLT3 (+).  On Midostaurin 50 mg PO BID until Day 14 (held starting 6/8 to 6/11).   - day 14 bone marrow biopsy completed 6/11, results pending          Abnormal liver enzymes    - baseline normal  - bilirubin 1.1, AST 6 and ALT 38 today  - Midostaurin restarted 6/11 as liver enzymes normalizing        Hypophosphatemia    - phos 2.6 today  - replace per electrolyte protocol        Hypomagnesemia    - mag 1.5 today  - replace per electrolyte replacement protocol        Vaginal mucositis    - Rash appears smaller in area, less painful per patient. She reports that Clabetasol cream makes irritation worse so it was discontinued  - Morphine to 2 mg IV Q3 hrs        Pancytopenia    - secondary to AML and chemotherapy.   - wbc: 90/mm3 with ANC: 0, Hb: 6.9 g/dL, and Plt: 28,000/mm3.   - will transfuse 1 unit of PRBC today  - tranfuse if Hb < 7 g/dL and Plt < 10,000/mm3 or active bleed.           MCTD (mixed connective tissue disease)    - MCTD-NOS  - previously on MTX and Prednisone.   - off therapy now and otherwise doing well.   - can follow up outpatient with her Rheumatologist upon discharge.         Bilateral headaches    - complained of bilateral temporal headaches earlier in hospitalization course  - similar to ones on presentation at OSH  - CT Head done at OSH was unremarkable.   - will consider re-imaging if deemed necessary.   - no complaints of headache today  - PRN Tylenol for now.           Neutropenic fever    - T.max 100.9 at 4 pm on 6/11.   - blood cultures from 6/11 at 0430 with gram + cocci resembling staph, CXR and UA unremarkable.   - Cefepime day 2, Vancomycin started this am           Pulmonary nodule    - CXR suggestive of 7 mm slightly irregular shaped radiodensity projected over the left upper lung zone which could represent a calcified granuloma but remains indeterminate.  - CT Chest on 5/28: Multiple scattered noncalcified pulmonary granulomas, the largest measuring  8 mm in the lateral segment of the right middle lobe.   - repeat non-contrast chest CT at 6-12 months         Insomnia    - PRN nightly Xanax.         HTN (hypertension)    - history of SVT and reports intermittent palpitations at times.  - EKG on 5/28 - NSR.  - increased metoprolol to 25 mg QD,she feels better with this dose.        GERD (gastroesophageal reflux disease)    - takes Omeprazole at home, now on pepcid here  - no issue         Osteoporosis    - takes prolia once every 6 months, last given mid April 2018   - no acute issue         Depression    - no acute issue   - continue home Escitalopram             VTE Risk Mitigation         Ordered     heparin, porcine (PF) 100 unit/mL injection flush 300 Units  As needed (PRN)      05/29/18 1024     Place MAYLIN hose  Until discontinued      05/25/18 0032     IP VTE HIGH RISK PATIENT  Once      05/25/18 0032          Disposition: pending day 14 BM bx results and count recovery    Sweetie Lepe NP  Bone Marrow Transplant  [facility]

## 2018-06-12 NOTE — ASSESSMENT & PLAN NOTE
- Rash appears smaller in area, less painful per patient. She reports that Clabetasol cream makes irritation worse so it was discontinued  - Morphine to 2 mg IV Q3 hrs

## 2018-06-12 NOTE — PROGRESS NOTES
" [facility]  Adult Nutrition  Progress Note    SUMMARY       Recommendations    Recommendation/Intervention:   1. Continue w/ Regular diet.   2. Encourage PO intake >/= 75% EEN/EPN   3. If PO intake is <50% encourage HVP w/ each meal, small frequent meal, snacks, ONS until nausea resolves  4. 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 RD notes 10lb wt loss x 1mo. Had 2 episodes of diarrhea yesterday, but none today. Pt denies Boost. She reports family hx of leukemia. Her mother  at age of 49 with leukemia. No h/o previous cancer, radiation or chemotherapy.  Day 14 Bone Marrow biopsy collected. Pt spiked Temp over night 101.78  cefepime started. Phos and Mg will be replaced today.  Nutrition Discharge Planning: Regular diet w/ po intake 75% EEN/ EPN + ONS as needed to maintain elevated Kcal/ Protein needs    Nutrition Risk Screen    Nutrition Risk Screen: no indicators present    Nutrition/Diet History    Patient Reported Diet/Restrictions/Preferences: general  Food Preferences: none  Do you have any cultural, spiritual, Oriental orthodox conflicts, given your current situation?: none stated   Food Allergies: NKFA  Factors Affecting Nutritional Intake: early satiety (inactivity )    Anthropometrics    Temp: 97.9 °F (36.6 °C)  Height Method: Stated  Height: 5' 3" (160 cm)  Height (inches): 63 in  Weight Method: Standard Scale  Weight: 70.1 kg (154 lb 8.7 oz)  Weight (lb): 154.54 lb  Ideal Body Weight (IBW), Female: 115 lb  % Ideal Body Weight, Female (lb): 137.45 lb  BMI " (Calculated): 28.1  BMI Grade: 18.5-24.9 - normal       Lab/Procedures/Meds    Pertinent Labs Reviewed: reviewed  Pertinent Labs Comments: WBC-0.09, H/H-6.9/19.7, Plts-28, Na-133, Co2-21, Ca-7.3, Phos-2.4, Mg-1.5, Alb-2.1, T.soco-1.1  Pertinent Medications Reviewed: reviewed  Pertinent Medications Comments: IVF, Acyclovir, Cefepime, Duke's Soln, Fentanyl, Heparin,  Mg, Phenergan, Vanc    Physical Findings/Assessment    Overall Physical Appearance: lethargic, overweight, weak  Tubes:  (PICC line access)  Oral/Mouth Cavity: WDL  Skin:  (Rash- Labia)    Estimated/Assessed Needs    Weight Used For Calorie Calculations: 71.7 kg (158 lb 1.1 oz)  Energy Calorie Requirements (kcal): 5440-1275 kcal/d  Energy Need Method: Kcal/kg (30-35kcal/kg)  Protein Requirements: 108-144g/d (1.5-1.8g/kg)  Weight Used For Protein Calculations: 71.7 kg (158 lb 1.1 oz)  Fluid Requirements (mL): 1ml per Kcal or Per MD  Fluid Need Method: RDA Method  RDA Method (mL): 2151  CHO Requirement: NA      Nutrition Prescription Ordered    Current Diet Order: Regular  Oral Nutrition Supplement: denied 2/2 taste    Evaluation of Received Nutrient/Fluid Intake    IV Fluid (mL): 2400  I/O: -821.6 L Since admit     Intake/Output Summary (Last 24 hours) at 06/12/18 1318  Last data filed at 06/12/18 1306   Gross per 24 hour   Intake           843.33 ml   Output              725 ml   Net           118.33 ml       Energy Calories Required: not meeting needs  Protein Required: not meeting needs  Fluid Required: meeting needs  Comments: LBM: 6/8/18  % Intake of Estimated Energy Needs: 25 - 50 %  % Meal Intake: 25 - 50 %    Nutrition Risk    Level of Risk/Frequency of Follow-up: moderate     Assessment and Plan    * 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 Continue w/ Regular diet.   2. Encourage PO intake >/= 75% EEN/EPN   3. If PO intake is <50% encourage HVP w/ each meal, small frequent meal, snacks, ONS until nausea resolves  4. RD to follow    Nutrition Diagnosis Status:   Continues               Monitor and Evaluation    Food and Nutrient Intake: energy intake, food and beverage intake  Food and Nutrient Administration: diet order  Physical Activity and Function: nutrition-related ADLs and IADLs  Anthropometric Measurements: weight, weight change  Biochemical Data, Medical Tests and Procedures:  (LBM: 5/31/18)  Nutrition-Focused Physical Findings: overall appearance     Nutrition Follow-Up    RD Follow-up?: Yes

## 2018-06-12 NOTE — PLAN OF CARE
MDR's with Dr Lugo.  Patient is day 15 of 7+3.  ANC 0.  BMB performed yesterday.  T 100.9 and blood cx + for gram + cocci.  On IV Vanc and cefepime.  PO intake low.  On IVF.  D/c pending bx results and count recovery.  Will continue to follow.

## 2018-06-12 NOTE — ASSESSMENT & PLAN NOTE
- baseline normal  - bilirubin 1.1, AST 6 and ALT 38 today  - Midostaurin restarted 6/11 as liver enzymes normalizing

## 2018-06-12 NOTE — ASSESSMENT & PLAN NOTE
Nutrition Problem  increased nutrient needs    Related to (etiology):   Physiological needs 2/2 AML     Signs and Symptoms (as evidenced by):   Pt receiving 7+3 regimen (day+14 increased Caloric/ Protein needs)    Interventions/Recommendations (treatment strategy):  1. Continue w/ Regular diet.   2. Encourage PO intake >/= 75% EEN/EPN   3. If PO intake is <50% encourage HVP w/ each meal, small frequent meal, snacks, ONS until nausea resolves  4. RD to follow    Nutrition Diagnosis Status:   Continues

## 2018-06-12 NOTE — PROGRESS NOTES
06/12/18 0938   Vital Signs   Temp 97.5 °F (36.4 °C)   Temp src Oral   Pulse 76   Heart Rate Source Monitor   Resp 18   SpO2 96 %   O2 Device (Oxygen Therapy) room air   BP (!) 97/53   BP Location Left arm   BP Method Automatic   Patient Position Lying   Aminata Mckenzie NP notified of hypotension continuing post PRBC transfusion. Will continue to monitor.

## 2018-06-12 NOTE — ASSESSMENT & PLAN NOTE
- T.max 100.9 at 4 pm on 6/11.   - blood cultures from 6/11 at 0430 with gram + cocci resembling staph, CXR and UA unremarkable.   - Cefepime day 2, Vancomycin started this am

## 2018-06-12 NOTE — ASSESSMENT & PLAN NOTE
- Admitted from [facility] on 5/25/18 early AM with WBC around 100s, for 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 Ordered routine labs in addition to FLT3, NGS, and AML FISH.  - lo res HLA typing on 5/26/18 and hi res on 5/27/18 done in anticipation of possible need for future transplant   - Pt with two daughters, two full sisters (in their mid 60s, one with brain aneurysm hx, other one without any medical issues), and one full brother (59 y/o healthy).  - ECHO ordered 5/25/18, EF 65%  - PICC line placed 5/25/18   - initially on hydrea, discontinued on 5/28.   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  - started on induction therapy on 5/29/18.  - Day 15 of induction therapy, tolerating treatment fairly well.    - allopurinol stopped 6/11  - prophylactic antimicrobials: Acyclovir, Voriconazole  - will need CNS Prophylaxis given her elevated WBC (high risk) on admission after count recovery.  - NPM1 +, CEBPA (-), FLT3 (+).  On Midostaurin 50 mg PO BID until Day 14 (held starting 6/8 to 6/11).   - day 14 bone marrow biopsy completed 6/11, results pending

## 2018-06-12 NOTE — SUBJECTIVE & OBJECTIVE
Subjective:     Interval History:   - Day 15 of induction chemotherapy  - Midostaurin restarted 6/11, liver enzymes improved  - afebrile since 6/11 at 4pm.   - BC with gram + cocci resembling staph  - cefepime day 2 and will start Vancomycin  - nausea controlled on scheduled Zofran  - labial rash is improving  - low BP this am, BP meds held and given 500 cc bolus       Objective:     Vital Signs (Most Recent):  Temp: 98.9 °F (37.2 °C) (06/12/18 0728)  Pulse: 80 (06/12/18 0728)  Resp: 16 (06/12/18 0728)  BP: (!) 91/52 (06/12/18 0624)  SpO2: 95 % (06/12/18 0728) Vital Signs (24h Range):  Temp:  [98.2 °F (36.8 °C)-100.9 °F (38.3 °C)] 98.9 °F (37.2 °C)  Pulse:  [80-94] 80  Resp:  [16-20] 16  SpO2:  [91 %-96 %] 95 %  BP: ()/(48-55) 91/52     Weight: 70.1 kg (154 lb 8.7 oz)  Body mass index is 27.38 kg/m².  Body surface area is 1.77 meters squared.      Intake/Output - Last 3 Shifts       06/10 0700 - 06/11 0659 06/11 0700 - 06/12 0659 06/12 0700 - 06/13 0659    P.O. 1490 720     I.V. (mL/kg) 3126.7 (44.6)      Blood  350     IV Piggyback 50 50     Total Intake(mL/kg) 4666.7 (66.6) 1120 (16)     Urine (mL/kg/hr) 950 (0.6) 600 (0.4) 350 (2.4)    Emesis/NG output 400 (0.2)      Stool 600 (0.4)      Total Output 1950 600 350    Net +2716.7 +520 -350           Stool Occurrence 1 x 1 x     Emesis Occurrence 1 x            Physical Exam   Constitutional: She is oriented to person, place, and time. She appears well-developed and well-nourished. No distress.   HENT:   Head: Normocephalic and atraumatic.   Eyes: Pupils are equal, round, and reactive to light.   Neck: Normal range of motion.   Cardiovascular: Normal rate and regular rhythm.    No murmur heard.  Pulmonary/Chest: Effort normal and breath sounds normal. No respiratory distress.   Abdominal: Soft. Bowel sounds are normal. There is no tenderness.   Genitourinary:   Genitourinary Comments: + labial erythematous, maculopapular rash.    Musculoskeletal: Normal  range of motion. She exhibits no edema.   Neurological: She is alert and oriented to person, place, and time.   Skin: Skin is warm and dry.   Vitals reviewed.      Significant Labs:   CBC:   Recent Labs  Lab 06/11/18 0308 06/12/18  0430   WBC 0.08* 0.09*   HGB 6.4* 6.9*   HCT 19.1* 19.7*   PLT 9* 28*    and CMP:   Recent Labs  Lab 06/11/18 0308 06/12/18  0430   * 133*   K 3.9 3.8    105   CO2 20* 21*   * 92   BUN 13 17   CREATININE 0.7 0.8   CALCIUM 7.4* 7.3*   PROT 5.4* 5.3*   ALBUMIN 2.3* 2.1*   BILITOT 0.8 1.1*   ALKPHOS 336* 298*   AST 7* 6*   ALT 64* 38   ANIONGAP 6* 7*   EGFRNONAA >60.0 >60.0       Diagnostic Results:  None

## 2018-06-12 NOTE — ASSESSMENT & PLAN NOTE
- secondary to AML and chemotherapy.   - wbc: 90/mm3 with ANC: 0, Hb: 6.9 g/dL, and Plt: 28,000/mm3.   - will transfuse 1 unit of PRBC today  - tranfuse if Hb < 7 g/dL and Plt < 10,000/mm3 or active bleed.

## 2018-06-12 NOTE — PLAN OF CARE
Problem: Occupational Therapy Goal  Goal: Occupational Therapy Goal  Goals to be met by: 6/18/18     Patient will demonstrate no loss of functional independence with ADLs by performing basic ADL's at mod I/I level:         Outcome: Ongoing (interventions implemented as appropriate)  Continue POC while pt is receiving treatment.     Lupe Wild, OTR/L  [phone]  6/12/2018

## 2018-06-12 NOTE — PLAN OF CARE
Problem: Patient Care Overview  Goal: Plan of Care Review  Outcome: Ongoing (interventions implemented as appropriate)  Fall, pressure ulcer, and infection precautions continued. One unit of PRBCs given as ordered. Electrolytes replaced as ordered. Pain, and anxiety managed with PRN medications. Scheduled Metoprolol held per parameters. 500ml NS bolus given for hypotension and continuous IVF initiated after bolus. Hypotension resolved. Vancomycin initiated, Cefepime continued. Patient stable, will continue to monitor.

## 2018-06-12 NOTE — PLAN OF CARE
Problem: Patient Care Overview  Goal: Plan of Care Review  Outcome: Ongoing (interventions implemented as appropriate)   06/12/18 1318   Coping/Psychosocial   Plan Of Care Reviewed With patient      Recommendations     Recommendation/Intervention:   1. Continue w/ Regular diet.   2. Encourage PO intake >/= 75% EEN/EPN   3. If PO intake is <50% encourage HVP w/ each meal, small frequent meal, snacks, ONS until nausea resolves  4. 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 Continue w/ Regular diet.   2. Encourage PO intake >/= 75% EEN/EPN   3. If PO intake is <50% encourage HVP w/ each meal, small frequent meal, snacks, ONS until nausea resolves  4. RD to follow

## 2018-06-13 PROBLEM — D61.818 PANCYTOPENIA: Status: ACTIVE | Noted: 2018-06-13

## 2018-06-13 PROBLEM — R74.8 ABNORMAL LIVER ENZYMES: Status: ACTIVE | Noted: 2018-06-13

## 2018-06-13 LAB
ALBUMIN SERPL BCP-MCNC: 1.9 G/DL
ALP SERPL-CCNC: 352 U/L
ALT SERPL W/O P-5'-P-CCNC: 22 U/L
ANION GAP SERPL CALC-SCNC: 7 MMOL/L
ANISOCYTOSIS BLD QL SMEAR: SLIGHT
AST SERPL-CCNC: 6 U/L
BASOPHILS # BLD AUTO: ABNORMAL K/UL
BASOPHILS NFR BLD: 0 %
BILIRUB SERPL-MCNC: 1.6 MG/DL
BUN SERPL-MCNC: 16 MG/DL
CALCIUM SERPL-MCNC: 6.8 MG/DL
CHLORIDE SERPL-SCNC: 106 MMOL/L
CO2 SERPL-SCNC: 20 MMOL/L
CREAT SERPL-MCNC: 0.7 MG/DL
DIFFERENTIAL METHOD: ABNORMAL
EOSINOPHIL # BLD AUTO: ABNORMAL K/UL
EOSINOPHIL NFR BLD: 0 %
ERYTHROCYTE [DISTWIDTH] IN BLOOD BY AUTOMATED COUNT: 16.1 %
EST. GFR  (AFRICAN AMERICAN): >60 ML/MIN/1.73 M^2
EST. GFR  (NON AFRICAN AMERICAN): >60 ML/MIN/1.73 M^2
GLUCOSE SERPL-MCNC: 98 MG/DL
HCT VFR BLD AUTO: 21.6 %
HGB BLD-MCNC: 7.2 G/DL
HYPOCHROMIA BLD QL SMEAR: ABNORMAL
IMM GRANULOCYTES # BLD AUTO: ABNORMAL K/UL
IMM GRANULOCYTES NFR BLD AUTO: ABNORMAL %
LYMPHOCYTES # BLD AUTO: ABNORMAL K/UL
LYMPHOCYTES NFR BLD: 100 %
MAGNESIUM SERPL-MCNC: 1.5 MG/DL
MCH RBC QN AUTO: 30.3 PG
MCHC RBC AUTO-ENTMCNC: 33.3 G/DL
MCV RBC AUTO: 91 FL
MONOCYTES # BLD AUTO: ABNORMAL K/UL
MONOCYTES NFR BLD: 0 %
NEUTROPHILS NFR BLD: 0 %
NRBC BLD-RTO: 0 /100 WBC
PHOSPHATE SERPL-MCNC: 2.1 MG/DL
PLATELET # BLD AUTO: 14 K/UL
PLATELET BLD QL SMEAR: ABNORMAL
PMV BLD AUTO: 10.1 FL
POTASSIUM SERPL-SCNC: 3.5 MMOL/L
PROT SERPL-MCNC: 4.9 G/DL
RBC # BLD AUTO: 2.38 M/UL
SODIUM SERPL-SCNC: 133 MMOL/L
VANCOMYCIN TROUGH SERPL-MCNC: 12.2 UG/ML
WBC # BLD AUTO: 0.1 K/UL

## 2018-06-13 PROCEDURE — 25000003 PHARM REV CODE 250: Performed by: INTERNAL MEDICINE

## 2018-06-13 PROCEDURE — 25000003 PHARM REV CODE 250: Performed by: NURSE PRACTITIONER

## 2018-06-13 PROCEDURE — 87040 BLOOD CULTURE FOR BACTERIA: CPT

## 2018-06-13 PROCEDURE — 63600175 PHARM REV CODE 636 W HCPCS: Performed by: INTERNAL MEDICINE

## 2018-06-13 PROCEDURE — A4216 STERILE WATER/SALINE, 10 ML: HCPCS | Performed by: INTERNAL MEDICINE

## 2018-06-13 PROCEDURE — 84100 ASSAY OF PHOSPHORUS: CPT

## 2018-06-13 PROCEDURE — 20600001 HC STEP DOWN PRIVATE ROOM

## 2018-06-13 PROCEDURE — 80202 ASSAY OF VANCOMYCIN: CPT

## 2018-06-13 PROCEDURE — 25000003 PHARM REV CODE 250: Performed by: HOSPITALIST

## 2018-06-13 PROCEDURE — 85007 BL SMEAR W/DIFF WBC COUNT: CPT

## 2018-06-13 PROCEDURE — 63600175 PHARM REV CODE 636 W HCPCS: Performed by: NURSE PRACTITIONER

## 2018-06-13 PROCEDURE — 99232 SBSQ HOSP IP/OBS MODERATE 35: CPT | Mod: ,,, | Performed by: INTERNAL MEDICINE

## 2018-06-13 PROCEDURE — 80053 COMPREHEN METABOLIC PANEL: CPT

## 2018-06-13 PROCEDURE — 85027 COMPLETE CBC AUTOMATED: CPT

## 2018-06-13 PROCEDURE — 63600175 PHARM REV CODE 636 W HCPCS: Performed by: STUDENT IN AN ORGANIZED HEALTH CARE EDUCATION/TRAINING PROGRAM

## 2018-06-13 PROCEDURE — 25000003 PHARM REV CODE 250: Performed by: STUDENT IN AN ORGANIZED HEALTH CARE EDUCATION/TRAINING PROGRAM

## 2018-06-13 PROCEDURE — 83735 ASSAY OF MAGNESIUM: CPT

## 2018-06-13 RX ORDER — ONDANSETRON 2 MG/ML
8 INJECTION INTRAMUSCULAR; INTRAVENOUS EVERY 8 HOURS PRN
Status: DISCONTINUED | OUTPATIENT
Start: 2018-06-13 | End: 2018-07-03

## 2018-06-13 RX ORDER — LORAZEPAM 2 MG/ML
0.5 INJECTION INTRAMUSCULAR EVERY 6 HOURS PRN
Status: DISCONTINUED | OUTPATIENT
Start: 2018-06-13 | End: 2018-07-03

## 2018-06-13 RX ORDER — PANTOPRAZOLE SODIUM 40 MG/1
40 TABLET, DELAYED RELEASE ORAL DAILY
Status: DISCONTINUED | OUTPATIENT
Start: 2018-06-13 | End: 2018-06-20

## 2018-06-13 RX ORDER — METOPROLOL TARTRATE 25 MG/1
25 TABLET, FILM COATED ORAL 2 TIMES DAILY
Status: DISCONTINUED | OUTPATIENT
Start: 2018-06-13 | End: 2018-07-03

## 2018-06-13 RX ADMIN — Medication 1 G: at 09:06

## 2018-06-13 RX ADMIN — LOPERAMIDE HYDROCHLORIDE 2 MG: 2 CAPSULE ORAL at 09:06

## 2018-06-13 RX ADMIN — CEFEPIME 2 G: 2 INJECTION, POWDER, FOR SOLUTION INTRAVENOUS at 08:06

## 2018-06-13 RX ADMIN — Medication 10 ML: at 04:06

## 2018-06-13 RX ADMIN — POTASSIUM & SODIUM PHOSPHATES POWDER PACK 280-160-250 MG 1 PACKET: 280-160-250 PACK at 12:06

## 2018-06-13 RX ADMIN — VORICONAZOLE 200 MG: 200 TABLET, FILM COATED ORAL at 08:06

## 2018-06-13 RX ADMIN — VORICONAZOLE 200 MG: 200 TABLET, FILM COATED ORAL at 09:06

## 2018-06-13 RX ADMIN — PANTOPRAZOLE SODIUM 40 MG: 40 TABLET, DELAYED RELEASE ORAL at 09:06

## 2018-06-13 RX ADMIN — POTASSIUM & SODIUM PHOSPHATES POWDER PACK 280-160-250 MG 1 PACKET: 280-160-250 PACK at 04:06

## 2018-06-13 RX ADMIN — POTASSIUM CHLORIDE 20 MEQ: 750 CAPSULE, EXTENDED RELEASE ORAL at 10:06

## 2018-06-13 RX ADMIN — ACYCLOVIR 400 MG: 200 CAPSULE ORAL at 08:06

## 2018-06-13 RX ADMIN — Medication 400 MG: at 09:06

## 2018-06-13 RX ADMIN — ALPRAZOLAM 0.25 MG: 0.25 TABLET ORAL at 12:06

## 2018-06-13 RX ADMIN — CEFEPIME 2 G: 2 INJECTION, POWDER, FOR SOLUTION INTRAVENOUS at 04:06

## 2018-06-13 RX ADMIN — ONDANSETRON 8 MG: 2 INJECTION INTRAMUSCULAR; INTRAVENOUS at 02:06

## 2018-06-13 RX ADMIN — Medication 10 ML: at 09:06

## 2018-06-13 RX ADMIN — Medication 400 MG: at 12:06

## 2018-06-13 RX ADMIN — METOPROLOL TARTRATE 25 MG: 25 TABLET, FILM COATED ORAL at 09:06

## 2018-06-13 RX ADMIN — ESCITALOPRAM 10 MG: 5 TABLET, FILM COATED ORAL at 09:06

## 2018-06-13 RX ADMIN — Medication 1 G: at 10:06

## 2018-06-13 RX ADMIN — Medication 10 ML: at 12:06

## 2018-06-13 RX ADMIN — RAMELTEON 8 MG: 8 TABLET, FILM COATED ORAL at 08:06

## 2018-06-13 RX ADMIN — POTASSIUM & SODIUM PHOSPHATES POWDER PACK 280-160-250 MG 1 PACKET: 280-160-250 PACK at 09:06

## 2018-06-13 RX ADMIN — SODIUM CHLORIDE: 0.9 INJECTION, SOLUTION INTRAVENOUS at 04:06

## 2018-06-13 RX ADMIN — POTASSIUM & SODIUM PHOSPHATES POWDER PACK 280-160-250 MG 1 PACKET: 280-160-250 PACK at 08:06

## 2018-06-13 RX ADMIN — LOPERAMIDE HYDROCHLORIDE 2 MG: 2 CAPSULE ORAL at 06:06

## 2018-06-13 RX ADMIN — CALCIUM CARBONATE (ANTACID) CHEW TAB 500 MG 500 MG: 500 CHEW TAB at 09:06

## 2018-06-13 RX ADMIN — METOPROLOL TARTRATE 25 MG: 25 TABLET, FILM COATED ORAL at 08:06

## 2018-06-13 RX ADMIN — Medication 400 MG: at 05:06

## 2018-06-13 RX ADMIN — FAMOTIDINE 20 MG: 20 TABLET ORAL at 08:06

## 2018-06-13 RX ADMIN — SODIUM CHLORIDE: 0.9 INJECTION, SOLUTION INTRAVENOUS at 02:06

## 2018-06-13 RX ADMIN — POTASSIUM CHLORIDE 20 MEQ: 750 CAPSULE, EXTENDED RELEASE ORAL at 09:06

## 2018-06-13 RX ADMIN — ACYCLOVIR 400 MG: 200 CAPSULE ORAL at 09:06

## 2018-06-13 RX ADMIN — Medication 2 MG: at 06:06

## 2018-06-13 RX ADMIN — FAMOTIDINE 20 MG: 20 TABLET ORAL at 09:06

## 2018-06-13 RX ADMIN — CEFEPIME 2 G: 2 INJECTION, POWDER, FOR SOLUTION INTRAVENOUS at 12:06

## 2018-06-13 NOTE — ASSESSMENT & PLAN NOTE
- history of SVT and reports intermittent palpitations at times.  - EKG on 5/28 - NSR.  - increased metoprolol XL to 25 mg QD,she feels better with this dose.  - hypotensive x 24 hours  - will change to metoprolol becnzedk83.5 mg bid (hold for SBP <100)

## 2018-06-13 NOTE — PLAN OF CARE
Problem: Patient Care Overview  Goal: Plan of Care Review  Outcome: Ongoing (interventions implemented as appropriate)  Pt is AAOx3.Pt is ambulatory and independent.Pt able to perform all ADL's without assistance. Pt denies pian and/or discomfort at this time.No breakdown noted, po fluids encouraged.Standard precautions maintained.  Pt remains injury and fall free, non skid footwear donned, call light within reach, personal items within reach, bed in low/locked position, pt able to voice needs all needs voiced have been met at this time.

## 2018-06-13 NOTE — PROGRESS NOTES
[facility]  Hematology  Bone Marrow Transplant  Progress Note    Patient Name: Noreen Mac  Admission Date: 5/24/2018  Hospital Length of Stay: 20 days  Code Status: Full Code    Subjective:     Interval History:   Day 16 7+3. BC with staph aureus, identification pending. Surveillance blood cultures done today. Afebrile x 48 hours. On cefepime and Vanc. Labial mucositis resolved. No complaints of pain. Nausea controlled. No diarrhea. Primary complaint is fatigue.     Objective:     Vital Signs (Most Recent):  Temp: 98.8 °F (37.1 °C) (06/13/18 0746)  Pulse: 87 (06/13/18 0746)  Resp: 16 (06/13/18 0746)  BP: (!) 107/54 (06/13/18 0746)  SpO2: (!) 92 % (06/13/18 0746) Vital Signs (24h Range):  Temp:  [97.9 °F (36.6 °C)-98.9 °F (37.2 °C)] 98.8 °F (37.1 °C)  Pulse:  [79-90] 87  Resp:  [16-20] 16  SpO2:  [91 %-96 %] 92 %  BP: ()/(49-64) 107/54     Weight: 70.1 kg (154 lb 8.7 oz)  Body mass index is 27.38 kg/m².  Body surface area is 1.77 meters squared.    Intake/Output - Last 3 Shifts       06/11 0700 - 06/12 0659 06/12 0700 - 06/13 0659 06/13 0700 - 06/14 0659    P.O. 720 1200     I.V. (mL/kg)  1713.3 (24.4)     Blood 700      IV Piggyback 50 500     Total Intake(mL/kg) 1470 (21) 3413.3 (48.7)     Urine (mL/kg/hr) 600 (0.4) 1550 (0.9)     Total Output 600 1550      Net +870 +1863.3             Urine Occurrence  1 x     Stool Occurrence 1 x 1 x           Physical Exam   Constitutional: She is oriented to person, place, and time. She appears well-developed and well-nourished. No distress.   HENT:   Head: Normocephalic and atraumatic.   Eyes: Pupils are equal, round, and reactive to light.   Neck: Normal range of motion.   Cardiovascular: Normal rate and regular rhythm.    No murmur heard.  Pulmonary/Chest: Effort normal and breath sounds normal. No respiratory distress.   Abdominal: Soft. Bowel sounds are normal. There is no tenderness.   Genitourinary:   Genitourinary Comments: + mild  redness, rash resolved    Musculoskeletal: Normal range of motion. She exhibits no edema.   Neurological: She is alert and oriented to person, place, and time.   Skin: Skin is warm and dry.   Vitals reviewed.      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 Ordered routine labs in addition to FLT3, NGS, and AML FISH.  - lo res HLA typing on 5/26/18 and hi res on 5/27/18 done in anticipation of possible need for future transplant   - Pt with two daughters, two full sisters (in their mid 60s, one with brain aneurysm hx, other one without any medical issues), and one full brother (61 y/o healthy).  - ECHO ordered 5/25/18, EF 65%  - PICC line placed 5/25/18   - initially on hydrea, discontinued on 5/28.   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  - started on induction therapy on 5/29/18.  - Day 16 of induction therapy, tolerating treatment fairly well.    - allopurinol stopped 6/11  - prophylactic antimicrobials: Acyclovir, Voriconazole  - will need CNS Prophylaxis given her elevated WBC (high risk) on admission after count recovery.  - NPM1 +, CEBPA (-), FLT3 (+).  On Midostaurin 50 mg PO BID until Day 14 (held starting 6/8 to 6/11). Will continue til day 21 (6/18)  - day 14 bone  marrow biopsy completed 6/11, results pending          Neutropenic fever    - afebrile x 48 hours   - blood cultures from 6/11 at 0430 with gram + cocci, staph species identification pending, CXR and UA unremarkable.   - Cefepime day 3, Vancomycin day 2   - surveillance blood cultures repeated this am          Pancytopenia    - secondary to chemotherapy.   - wbc: 10/mm3 with ANC: 0, Hb: 7.2 g/dL, and Plt: 14,000/mm3.   - tranfuse if Hb < 7 g/dL and Plt < 10,000/mm3 or active bleed.           Abnormal liver enzymes    - baseline normal  - bilirubin 1.6 today, due to transfusion vs medication (Midostaurin)  - AST 6 and ALT 22 today  - Midostaurin restarted 6/11 as liver enzymes normalizing        Hypophosphatemia    - phos 2.1 today  - replace per electrolyte protocol        Hypomagnesemia    - mag 1.5 today  - replace per electrolyte replacement protocol        Vaginal mucositis    - evaluated by derm  - rash essentially resolved and pain much improved          MCTD (mixed connective tissue disease)    - MCTD-NOS  - previously on MTX and Prednisone.   - off therapy now and otherwise doing well.   - can follow up outpatient with her Rheumatologist upon discharge.         Bilateral headaches    - complained of bilateral temporal headaches earlier in hospitalization course  - similar to ones on presentation at OSH  - CT Head done at OSH was unremarkable.   - will consider re-imaging if deemed necessary.   - currently resolved          Pulmonary nodule    - CXR suggestive of 7 mm slightly irregular shaped radiodensity projected over the left upper lung zone which could represent a calcified granuloma but remains indeterminate.  - CT Chest on 5/28: Multiple scattered noncalcified pulmonary granulomas, the largest measuring 8 mm in the lateral segment of the right middle lobe.   - repeat non-contrast chest CT at 6-12 months         Insomnia    - PRN nightly Xanax.         HTN (hypertension)    - history of SVT and reports  intermittent palpitations at times.  - EKG on 5/28 - NSR.  - increased metoprolol XL to 25 mg QD,she feels better with this dose.  - hypotensive x 24 hours  - will change to metoprolol ulqvzoif88.5 mg bid (hold for SBP <100)        GERD (gastroesophageal reflux disease)    - takes Omeprazole at home, now on pepcid here  - complains of severe reflux this am  - will change PPI to Protonix        Osteoporosis    - takes prolia once every 6 months, last given mid April 2018   - no acute issue         Depression    - no acute issue   - continue home Escitalopram             VTE Risk Mitigation         Ordered     heparin, porcine (PF) 100 unit/mL injection flush 300 Units  As needed (PRN)      05/29/18 1024     Place MAYLIN hose  Until discontinued      05/25/18 0032     IP VTE HIGH RISK PATIENT  Once      05/25/18 0032          Disposition: pending day 14 biopsy results and count recovery    Sweetie Lepe, NP  Bone Marrow Transplant  [facility]

## 2018-06-13 NOTE — ASSESSMENT & PLAN NOTE
- takes Omeprazole at home, now on pepcid here  - complains of severe reflux this am  - will change PPI to Protonix

## 2018-06-13 NOTE — ASSESSMENT & PLAN NOTE
- Admitted from [facility] on 5/25/18 early AM with WBC around 100s, for 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 Ordered routine labs in addition to FLT3, NGS, and AML FISH.  - lo res HLA typing on 5/26/18 and hi res on 5/27/18 done in anticipation of possible need for future transplant   - Pt with two daughters, two full sisters (in their mid 60s, one with brain aneurysm hx, other one without any medical issues), and one full brother (59 y/o healthy).  - ECHO ordered 5/25/18, EF 65%  - PICC line placed 5/25/18   - initially on hydrea, discontinued on 5/28.   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  - started on induction therapy on 5/29/18.  - Day 16 of induction therapy, tolerating treatment fairly well.    - allopurinol stopped 6/11  - prophylactic antimicrobials: Acyclovir, Voriconazole  - will need CNS Prophylaxis given her elevated WBC (high risk) on admission after count recovery.  - NPM1 +, CEBPA (-), FLT3 (+).  On Midostaurin 50 mg PO BID until Day 14 (held starting 6/8 to 6/11). Will continue til day 21 (6/18)  - day 14 bone marrow biopsy completed 6/11, results pending

## 2018-06-13 NOTE — ASSESSMENT & PLAN NOTE
- complained of bilateral temporal headaches earlier in hospitalization course  - similar to ones on presentation at OSH  - CT Head done at OSH was unremarkable.   - will consider re-imaging if deemed necessary.   - currently resolved

## 2018-06-13 NOTE — ASSESSMENT & PLAN NOTE
- secondary to chemotherapy.   - wbc: 10/mm3 with ANC: 0, Hb: 7.2 g/dL, and Plt: 14,000/mm3.   - tranfuse if Hb < 7 g/dL and Plt < 10,000/mm3 or active bleed.

## 2018-06-13 NOTE — PLAN OF CARE
Problem: Patient Care Overview  Goal: Plan of Care Review  Outcome: Ongoing (interventions implemented as appropriate)  Patient is alert, awake and oriented to person, place, time and situation.  Remains free from fall or injury this shift.  No complaints of pain.  Patient has had one episode this shift of loose stool relieved by immodium.  Patient continues to complain of indigestion/heartburn and has had one episode of emesis this shift.  Medications were adjusted but only mild relief obtained so far.  Patient's perineum is much improved - she states it no longer hurts and is visibly less red/swollen.  Patient is losing large clumps of hair and states that her scalp is tender.  Patient's daughter has remained at bedside throughout the shift.  Patient has no questions/concerns at this time.

## 2018-06-13 NOTE — ASSESSMENT & PLAN NOTE
- afebrile x 48 hours   - blood cultures from 6/11 at 0430 with gram + cocci, staph species identification pending, CXR and UA unremarkable.   - Cefepime day 3, Vancomycin day 2   - surveillance blood cultures repeated this am

## 2018-06-13 NOTE — ASSESSMENT & PLAN NOTE
- baseline normal  - bilirubin 1.6 today, due to transfusion vs medication (Midostaurin)  - AST 6 and ALT 22 today  - Midostaurin restarted 6/11 as liver enzymes normalizing

## 2018-06-13 NOTE — SUBJECTIVE & OBJECTIVE
Subjective:     Interval History:   Day 16 7+3. BC with staph aureus, identification pending. Surveillance blood cultures done today. Afebrile x 48 hours. On cefepime and Vanc. Labial mucositis resolved. No complaints of pain. Nausea controlled. No diarrhea. Primary complaint is fatigue.     Objective:     Vital Signs (Most Recent):  Temp: 98.8 °F (37.1 °C) (06/13/18 0746)  Pulse: 87 (06/13/18 0746)  Resp: 16 (06/13/18 0746)  BP: (!) 107/54 (06/13/18 0746)  SpO2: (!) 92 % (06/13/18 0746) Vital Signs (24h Range):  Temp:  [97.9 °F (36.6 °C)-98.9 °F (37.2 °C)] 98.8 °F (37.1 °C)  Pulse:  [79-90] 87  Resp:  [16-20] 16  SpO2:  [91 %-96 %] 92 %  BP: ()/(49-64) 107/54     Weight: 70.1 kg (154 lb 8.7 oz)  Body mass index is 27.38 kg/m².  Body surface area is 1.77 meters squared.    Intake/Output - Last 3 Shifts       06/11 0700 - 06/12 0659 06/12 0700 - 06/13 0659 06/13 0700 - 06/14 0659    P.O. 720 1200     I.V. (mL/kg)  1713.3 (24.4)     Blood 700      IV Piggyback 50 500     Total Intake(mL/kg) 1470 (21) 3413.3 (48.7)     Urine (mL/kg/hr) 600 (0.4) 1550 (0.9)     Total Output 600 1550      Net +870 +1863.3             Urine Occurrence  1 x     Stool Occurrence 1 x 1 x           Physical Exam   Constitutional: She is oriented to person, place, and time. She appears well-developed and well-nourished. No distress.   HENT:   Head: Normocephalic and atraumatic.   Eyes: Pupils are equal, round, and reactive to light.   Neck: Normal range of motion.   Cardiovascular: Normal rate and regular rhythm.    No murmur heard.  Pulmonary/Chest: Effort normal and breath sounds normal. No respiratory distress.   Abdominal: Soft. Bowel sounds are normal. There is no tenderness.   Genitourinary:   Genitourinary Comments: + mild redness, rash resolved    Musculoskeletal: Normal range of motion. She exhibits no edema.   Neurological: She is alert and oriented to person, place, and time.   Skin: Skin is warm and dry.   Vitals  reviewed.      Significant Labs:   CBC:   Recent Labs  Lab 06/12/18  0430 06/13/18  0500   WBC 0.09* 0.10*   HGB 6.9* 7.2*   HCT 19.7* 21.6*   PLT 28* 14*    and CMP:   Recent Labs  Lab 06/12/18  0430 06/13/18  0500   * 133*   K 3.8 3.5    106   CO2 21* 20*   GLU 92 98   BUN 17 16   CREATININE 0.8 0.7   CALCIUM 7.3* 6.8*   PROT 5.3* 4.9*   ALBUMIN 2.1* 1.9*   BILITOT 1.1* 1.6*   ALKPHOS 298* 352*   AST 6* 6*   ALT 38 22   ANIONGAP 7* 7*   EGFRNONAA >60.0 >60.0       Diagnostic Results:  None

## 2018-06-14 LAB
ALBUMIN SERPL BCP-MCNC: 1.7 G/DL
ALP SERPL-CCNC: 430 U/L
ALT SERPL W/O P-5'-P-CCNC: 16 U/L
ANION GAP SERPL CALC-SCNC: 7 MMOL/L
ANISOCYTOSIS BLD QL SMEAR: SLIGHT
AST SERPL-CCNC: 8 U/L
BACTERIA BLD CULT: NORMAL
BASOPHILS # BLD AUTO: 0 K/UL
BASOPHILS NFR BLD: 0 %
BILIRUB SERPL-MCNC: 1.6 MG/DL
BLD PROD TYP BPU: NORMAL
BLOOD UNIT EXPIRATION DATE: NORMAL
BLOOD UNIT TYPE CODE: 6200
BLOOD UNIT TYPE: NORMAL
BUN SERPL-MCNC: 12 MG/DL
CALCIUM SERPL-MCNC: 7 MG/DL
CHLORIDE SERPL-SCNC: 110 MMOL/L
CO2 SERPL-SCNC: 19 MMOL/L
CODING SYSTEM: NORMAL
CREAT SERPL-MCNC: 0.6 MG/DL
DIFFERENTIAL METHOD: ABNORMAL
DISPENSE STATUS: NORMAL
EOSINOPHIL # BLD AUTO: 0 K/UL
EOSINOPHIL NFR BLD: 0 %
ERYTHROCYTE [DISTWIDTH] IN BLOOD BY AUTOMATED COUNT: 15.9 %
EST. GFR  (AFRICAN AMERICAN): >60 ML/MIN/1.73 M^2
EST. GFR  (NON AFRICAN AMERICAN): >60 ML/MIN/1.73 M^2
FLT3 RESULT: NORMAL
GLUCOSE SERPL-MCNC: 88 MG/DL
HCT VFR BLD AUTO: 20.4 %
HGB BLD-MCNC: 7.1 G/DL
HYPOCHROMIA BLD QL SMEAR: ABNORMAL
IMM GRANULOCYTES # BLD AUTO: 0 K/UL
IMM GRANULOCYTES NFR BLD AUTO: 0 %
LYMPHOCYTES # BLD AUTO: 0.1 K/UL
LYMPHOCYTES NFR BLD: 91.7 %
MAGNESIUM SERPL-MCNC: 1 MG/DL
MCH RBC QN AUTO: 31 PG
MCHC RBC AUTO-ENTMCNC: 34.8 G/DL
MCV RBC AUTO: 89 FL
MONOCYTES # BLD AUTO: 0 K/UL
MONOCYTES NFR BLD: 0 %
NEUTROPHILS # BLD AUTO: 0 K/UL
NEUTROPHILS NFR BLD: 8.3 %
NRBC BLD-RTO: 0 /100 WBC
NUM UNITS TRANS WBC-POOR PLATPHERESIS: NORMAL
OVALOCYTES BLD QL SMEAR: ABNORMAL
PATH REPORT.FINAL DX SPEC: NORMAL
PHOSPHATE SERPL-MCNC: 1.2 MG/DL
PLATELET # BLD AUTO: 6 K/UL
PLATELET BLD QL SMEAR: ABNORMAL
PMV BLD AUTO: ABNORMAL FL
POIKILOCYTOSIS BLD QL SMEAR: SLIGHT
POLYCHROMASIA BLD QL SMEAR: ABNORMAL
POTASSIUM SERPL-SCNC: 3.6 MMOL/L
PROT SERPL-MCNC: 4.5 G/DL
RBC # BLD AUTO: 2.29 M/UL
SODIUM SERPL-SCNC: 136 MMOL/L
SPECIMEN TYPE: NORMAL
URATE SERPL-MCNC: 2 MG/DL
WBC # BLD AUTO: 0.12 K/UL

## 2018-06-14 PROCEDURE — 83735 ASSAY OF MAGNESIUM: CPT

## 2018-06-14 PROCEDURE — 25000003 PHARM REV CODE 250: Performed by: NURSE PRACTITIONER

## 2018-06-14 PROCEDURE — P9037 PLATE PHERES LEUKOREDU IRRAD: HCPCS

## 2018-06-14 PROCEDURE — 84550 ASSAY OF BLOOD/URIC ACID: CPT

## 2018-06-14 PROCEDURE — 36415 COLL VENOUS BLD VENIPUNCTURE: CPT

## 2018-06-14 PROCEDURE — A4216 STERILE WATER/SALINE, 10 ML: HCPCS | Performed by: INTERNAL MEDICINE

## 2018-06-14 PROCEDURE — 25000003 PHARM REV CODE 250: Performed by: STUDENT IN AN ORGANIZED HEALTH CARE EDUCATION/TRAINING PROGRAM

## 2018-06-14 PROCEDURE — 25000003 PHARM REV CODE 250: Performed by: INTERNAL MEDICINE

## 2018-06-14 PROCEDURE — 97803 MED NUTRITION INDIV SUBSEQ: CPT

## 2018-06-14 PROCEDURE — 84100 ASSAY OF PHOSPHORUS: CPT

## 2018-06-14 PROCEDURE — 99233 SBSQ HOSP IP/OBS HIGH 50: CPT | Mod: ,,, | Performed by: INTERNAL MEDICINE

## 2018-06-14 PROCEDURE — 63600175 PHARM REV CODE 636 W HCPCS: Performed by: STUDENT IN AN ORGANIZED HEALTH CARE EDUCATION/TRAINING PROGRAM

## 2018-06-14 PROCEDURE — 36430 TRANSFUSION BLD/BLD COMPNT: CPT

## 2018-06-14 PROCEDURE — 63600175 PHARM REV CODE 636 W HCPCS: Performed by: NURSE PRACTITIONER

## 2018-06-14 PROCEDURE — 63600175 PHARM REV CODE 636 W HCPCS: Performed by: INTERNAL MEDICINE

## 2018-06-14 PROCEDURE — 20600001 HC STEP DOWN PRIVATE ROOM

## 2018-06-14 PROCEDURE — 85025 COMPLETE CBC W/AUTO DIFF WBC: CPT

## 2018-06-14 PROCEDURE — 80053 COMPREHEN METABOLIC PANEL: CPT

## 2018-06-14 RX ORDER — HYDROCODONE BITARTRATE AND ACETAMINOPHEN 500; 5 MG/1; MG/1
TABLET ORAL
Status: DISCONTINUED | OUTPATIENT
Start: 2018-06-14 | End: 2018-06-15

## 2018-06-14 RX ORDER — DIPHENHYDRAMINE HYDROCHLORIDE 50 MG/ML
12.5 INJECTION INTRAMUSCULAR; INTRAVENOUS EVERY 6 HOURS PRN
Status: DISCONTINUED | OUTPATIENT
Start: 2018-06-14 | End: 2018-06-16

## 2018-06-14 RX ORDER — ACETAMINOPHEN 325 MG/1
650 TABLET ORAL EVERY 4 HOURS PRN
Status: DISCONTINUED | OUTPATIENT
Start: 2018-06-14 | End: 2018-07-13 | Stop reason: HOSPADM

## 2018-06-14 RX ADMIN — LOPERAMIDE HYDROCHLORIDE 2 MG: 2 CAPSULE ORAL at 04:06

## 2018-06-14 RX ADMIN — ALPRAZOLAM 0.25 MG: 0.25 TABLET ORAL at 06:06

## 2018-06-14 RX ADMIN — SODIUM CHLORIDE: 0.9 INJECTION, SOLUTION INTRAVENOUS at 03:06

## 2018-06-14 RX ADMIN — SODIUM PHOSPHATE, MONOBASIC, MONOHYDRATE 39.99 MMOL: 276; 142 INJECTION, SOLUTION INTRAVENOUS at 02:06

## 2018-06-14 RX ADMIN — CEFEPIME 2 G: 2 INJECTION, POWDER, FOR SOLUTION INTRAVENOUS at 01:06

## 2018-06-14 RX ADMIN — ACETAMINOPHEN 650 MG: 325 TABLET, FILM COATED ORAL at 02:06

## 2018-06-14 RX ADMIN — RAMELTEON 8 MG: 8 TABLET, FILM COATED ORAL at 09:06

## 2018-06-14 RX ADMIN — FAMOTIDINE 20 MG: 20 TABLET ORAL at 07:06

## 2018-06-14 RX ADMIN — Medication 2 MG: at 07:06

## 2018-06-14 RX ADMIN — DIPHENHYDRAMINE HYDROCHLORIDE 12.5 MG: 50 INJECTION, SOLUTION INTRAMUSCULAR; INTRAVENOUS at 08:06

## 2018-06-14 RX ADMIN — POTASSIUM CHLORIDE 20 MEQ: 750 CAPSULE, EXTENDED RELEASE ORAL at 08:06

## 2018-06-14 RX ADMIN — METOPROLOL TARTRATE 25 MG: 25 TABLET, FILM COATED ORAL at 09:06

## 2018-06-14 RX ADMIN — Medication 10 ML: at 12:06

## 2018-06-14 RX ADMIN — VORICONAZOLE 200 MG: 200 TABLET, FILM COATED ORAL at 08:06

## 2018-06-14 RX ADMIN — VORICONAZOLE 200 MG: 200 TABLET, FILM COATED ORAL at 09:06

## 2018-06-14 RX ADMIN — ACYCLOVIR 400 MG: 200 CAPSULE ORAL at 09:06

## 2018-06-14 RX ADMIN — FAMOTIDINE 20 MG: 20 TABLET ORAL at 08:06

## 2018-06-14 RX ADMIN — Medication 1 G: at 10:06

## 2018-06-14 RX ADMIN — CEFEPIME 2 G: 2 INJECTION, POWDER, FOR SOLUTION INTRAVENOUS at 04:06

## 2018-06-14 RX ADMIN — CEFEPIME 2 G: 2 INJECTION, POWDER, FOR SOLUTION INTRAVENOUS at 09:06

## 2018-06-14 RX ADMIN — Medication 800 MG: at 01:06

## 2018-06-14 RX ADMIN — ACYCLOVIR 400 MG: 200 CAPSULE ORAL at 08:06

## 2018-06-14 RX ADMIN — ESCITALOPRAM 10 MG: 5 TABLET, FILM COATED ORAL at 08:06

## 2018-06-14 RX ADMIN — ONDANSETRON 8 MG: 2 INJECTION INTRAMUSCULAR; INTRAVENOUS at 08:06

## 2018-06-14 RX ADMIN — Medication 10 ML: at 06:06

## 2018-06-14 RX ADMIN — ALPRAZOLAM 0.25 MG: 0.25 TABLET ORAL at 08:06

## 2018-06-14 RX ADMIN — Medication 10 ML: at 08:06

## 2018-06-14 RX ADMIN — PANTOPRAZOLE SODIUM 40 MG: 40 TABLET, DELAYED RELEASE ORAL at 08:06

## 2018-06-14 RX ADMIN — Medication 1 G: at 09:06

## 2018-06-14 RX ADMIN — Medication 10 ML: at 09:06

## 2018-06-14 RX ADMIN — ACETAMINOPHEN 650 MG: 325 TABLET, FILM COATED ORAL at 08:06

## 2018-06-14 NOTE — PROGRESS NOTES
" [facility]  Adult Nutrition  Progress Note    SUMMARY       Recommendations    Recommendation/Intervention:   1. Continue w/ Regular diet.   2. Encourage PO intake >/= 75% EEN/EPN   3. If PO intake is <50% encourage HVP w/ each meal, small frequent meal, snacks, fluids until nausea resolves  4. 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 nausea controlled w/ Zofran. no c/o VDC, or pain. bone marrow bx still pending. blood culture grew staff aureus.Bone Marrow bx pending  Nutrition Discharge Planning: Regular diet w/ po intake 75% EEN/ EPN + ONS as needed to maintain elevated Kcal/ Protein needs    Nutrition Risk Screen    Nutrition Risk Screen: no indicators present    Nutrition/Diet History    Patient Reported Diet/Restrictions/Preferences: general  Food Preferences: none  Do you have any cultural, spiritual, Lutheran conflicts, given your current situation?: none stated   Food Allergies: NKFA  Factors Affecting Nutritional Intake: early satiety (inactivity )    Anthropometrics    Temp: 97.9 °F (36.6 °C)  Height Method: Stated  Height: 5' 3" (160 cm)  Height (inches): 63 in  Weight Method: Standard Scale  Weight: 70.1 kg (154 lb 8.7 oz)  Weight (lb): 154.54 lb  Ideal Body Weight (IBW), Female: 115 lb  % Ideal Body Weight, Female (lb): 137.45 lb  BMI (Calculated): 28.1  BMI Grade: 18.5-24.9 - normal       Lab/Procedures/Meds    Pertinent Labs Reviewed: reviewed  Pertinent Labs Comments: WBC-0.12, H/H-7.1/20.4, Plts-6, BUN-7, Ca-7.0, T.soco-1.6, AST-8, ALT-16  Pertinent Medications Reviewed: reviewed  Pertinent " Medications Comments: IVF, Acyclovir, cefepime, Duke's soln. Fentanyl, heparin, Mg, KCl, Vanc.    Physical Findings/Assessment    Overall Physical Appearance: lethargic, overweight, weak  Tubes:  (PICC line access)  Oral/Mouth Cavity: WDL  Skin:  (Rash- Labia)    Estimated/Assessed Needs    Weight Used For Calorie Calculations: 71.7 kg (158 lb 1.1 oz)  Energy Calorie Requirements (kcal): 3161-1763 kcal/d  Energy Need Method: Kcal/kg (30-35kcal/kg)  Protein Requirements: 108-144g/d (1.5-1.8g/kg)  Weight Used For Protein Calculations: 71.7 kg (158 lb 1.1 oz)  Fluid Requirements (mL): 1ml per Kcal or Per MD  Fluid Need Method: RDA Method  RDA Method (mL): 2151  CHO Requirement: NA      Nutrition Prescription Ordered    Current Diet Order: Regular  Oral Nutrition Supplement: denied 2/2 taste    Evaluation of Received Nutrient/Fluid Intake    IV Fluid (mL): 2400  I/O: +3.5 L since admit  Energy Calories Required: not meeting needs  Protein Required: not meeting needs  Fluid Required: meeting needs  Comments: LBM: 6/14/18  % Intake of Estimated Energy Needs: 0 - 25 %  % Meal Intake: 0 - 25 %    Nutrition Risk    Level of Risk/Frequency of Follow-up: moderate     Assessment and Plan    * 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 Continue w/ Regular diet.   2. Encourage PO intake >/= 75% EEN/EPN   3. If PO intake is <50% encourage HVP w/ each meal, small frequent meal, snacks, ONS until nausea resolves  4. RD to follow    Nutrition Diagnosis Status:   Continues               Monitor and Evaluation    Food and Nutrient Intake: energy intake, food and beverage intake  Food and Nutrient Administration: diet order  Physical Activity and  Function: nutrition-related ADLs and IADLs  Anthropometric Measurements: weight, weight change  Biochemical Data, Medical Tests and Procedures:  (LBM: 5/31/18)  Nutrition-Focused Physical Findings: overall appearance     Nutrition Follow-Up    RD Follow-up?: Yes

## 2018-06-14 NOTE — ASSESSMENT & PLAN NOTE
- afebrile x 72 hours   - blood cultures from 6/11 at 0430 with gram + cocci, staph epidermis sensitive for Vancomycin, CXR and UA unremarkable.   - Cefepime day 4, Vancomycin day 3   - surveillance blood cultures repeated 6/13 NGTD

## 2018-06-14 NOTE — ASSESSMENT & PLAN NOTE
Nutrition Problem  increased nutrient needs    Related to (etiology):   Physiological needs 2/2 AML     Signs and Symptoms (as evidenced by):   Pt receiving 7+3 regimen (day+16 increased Caloric/ Protein needs)    Interventions/Recommendations (treatment strategy):  1. Continue w/ Regular diet.   2. Encourage PO intake >/= 75% EEN/EPN   3. If PO intake is <50% encourage HVP w/ each meal, small frequent meal, snacks, ONS until nausea resolves  4. RD to follow    Nutrition Diagnosis Status:   Continues

## 2018-06-14 NOTE — ASSESSMENT & PLAN NOTE
- baseline normal  - bilirubin 1.6 today, due to transfusion vs medication (Midostaurin)  - AST 8 and ALT16 today  - Midostaurin restarted 6/11 as liver enzymes normalizing

## 2018-06-14 NOTE — PLAN OF CARE
Problem: Patient Care Overview  Goal: Plan of Care Review  Outcome: Ongoing (interventions implemented as appropriate)  Afebrile. Free from falls or injury. Tylenol given for HA. NS infusing at 100ml/hr. Cefepime and Vanc given as scheduled. Vanc trough was 12.2. Immodium given once for diarrhea. Bed locked in lowest position, non skid socks on, call light within reach. Pt instructed to call if any assistance is needed. Vitals stable. Neutropenic precautions maintained. Will cont to rik pt.

## 2018-06-14 NOTE — ASSESSMENT & PLAN NOTE
- history of SVT and reports intermittent palpitations at times.  - EKG on 5/28 - NSR.  - increased metoprolol XL to 25 mg QD,she feels better with this dose.  - hypotensive x 48 hours  - metoprolol tartrate changed to 25 mg bid (hold for SBP <100)

## 2018-06-14 NOTE — PROGRESS NOTES
Sweetie Lepe NP notified of patient complaining of new blurred vision at close range. Will continue to monitor.

## 2018-06-14 NOTE — PHYSICIAN QUERY
PT Name: Noreen Mac  MR #: [id number]     Physician Query Form - Documentation Clarification      Madonna Harmon RN CDS    Contact Information: [phone]      This form is a permanent document in the medical record.     Query Date: June 14, 2018    By submitting this query, we are merely seeking further clarification of documentation. Please utilize your independent clinical judgment when addressing the question(s) below.    The Medical record reflects the following:    Supporting Clinical Findings Location in Medical Record   Acute leukemia - WBC around 100s   - daily CBC   - will need Bone marrow biopsy   - monitor for tumor lysis syndrome   - start on IVF     Neutropenic fever - spiked temp on 5/29 of 102, afebrile since then.   - blood cultures no growth, CXR and UA unremarkable.   - on Cefepime Day 2.     Due to & chemotherapy and leukemia     Neutropenic fever - afebrile x 72 hours   - blood cultures from 6/11 at 0430 with gram + cocci, staph epidermis sensitive for Vancomycin, CXR and UA unremarkable.   - Cefepime day 4, Vancomycin day 3   - surveillance blood cultures repeated 6/13 NGTD    5/25 H/P           5/29 Bone Marrow Transplant PN         5/30 Other Documentation    6/14 Bone Marrow Transplant PN             Staphylococcus epidermidis          6/11 Culture Blood                                                                               Doctor, Please specify diagnosis or diagnoses associated with above clinical findings.      Please clarify the relevance of positive cultures or what is the cause of positive cultures    Provider Use Only      1) Neutropenic fever    2) Sepsis secondary to Staphylococcus epidermidis  __________________________________                                                                                                               [  ] Clinically undetermined

## 2018-06-14 NOTE — SUBJECTIVE & OBJECTIVE
Subjective:     Interval History:   Day 17 of 7+3. Day 14 bone marrow results pending. BC with staph epidermis only sensitive to Vancomycin. Vanc day 3 and cefepime day 4. Vanc trough 12.2 last night. BP remains low but stable. Afebrile x 72 hours. Patient feels a little confused, states she wakes up and doesn't know what time it is or where she is. She has been taking Phenergan, Ativan and Benadryl pre-meds. She also reports with blurry vision when trying to read. No headaches or dizziness.     Objective:     Vital Signs (Most Recent):  Temp: 97.9 °F (36.6 °C) (06/14/18 1147)  Pulse: 74 (06/14/18 1147)  Resp: 18 (06/14/18 1147)  BP: (!) 108/55 (06/14/18 1147)  SpO2: 97 % (06/14/18 1147) Vital Signs (24h Range):  Temp:  [97.7 °F (36.5 °C)-99.6 °F (37.6 °C)] 97.9 °F (36.6 °C)  Pulse:  [74-88] 74  Resp:  [14-18] 18  SpO2:  [92 %-97 %] 97 %  BP: ()/(51-58) 108/55     Weight: 70.1 kg (154 lb 8.7 oz)  Body mass index is 27.38 kg/m².  Body surface area is 1.77 meters squared.      Intake/Output - Last 3 Shifts       06/12 0700 - 06/13 0659 06/13 0700 - 06/14 0659 06/14 0700 - 06/15 0659    P.O. 1200 480     I.V. (mL/kg) 1713.3 (24.4) 2033.3 (29)     Blood   329    IV Piggyback 500 500     Total Intake(mL/kg) 3413.3 (48.7) 3013.3 (43) 329 (4.7)    Urine (mL/kg/hr) 1550 (0.9) 2900 (1.7) 950 (2.7)    Total Output 1550 2900 950    Net +1863.3 +113.3 -621           Urine Occurrence 1 x 1 x     Stool Occurrence 1 x 3 x     Emesis Occurrence  1 x           Physical Exam   Constitutional: She is oriented to person, place, and time. She appears well-developed and well-nourished. No distress.   HENT:   Head: Normocephalic and atraumatic.   Eyes: Pupils are equal, round, and reactive to light.   Neck: Normal range of motion.   Cardiovascular: Normal rate and regular rhythm.    No murmur heard.  Pulmonary/Chest: Effort normal and breath sounds normal. No respiratory distress.   Abdominal: Soft. Bowel sounds are normal. There  is no tenderness.   Genitourinary:   Genitourinary Comments: + mild redness, rash resolved    Musculoskeletal: Normal range of motion. She exhibits no edema.   Neurological: She is alert and oriented to person, place, and time.   Skin: Skin is warm and dry.   Vitals reviewed.      Significant Labs:   CBC:   Recent Labs  Lab 06/13/18  0500 06/14/18  0349   WBC 0.10* 0.12*   HGB 7.2* 7.1*   HCT 21.6* 20.4*   PLT 14* 6*    and CMP:   Recent Labs  Lab 06/13/18  0500 06/14/18  0349   * 136   K 3.5 3.6    110   CO2 20* 19*   GLU 98 88   BUN 16 12   CREATININE 0.7 0.6   CALCIUM 6.8* 7.0*   PROT 4.9* 4.5*   ALBUMIN 1.9* 1.7*   BILITOT 1.6* 1.6*   ALKPHOS 352* 430*   AST 6* 8*   ALT 22 16   ANIONGAP 7* 7*   EGFRNONAA >60.0 >60.0       Diagnostic Results:  None

## 2018-06-14 NOTE — ASSESSMENT & PLAN NOTE
- secondary to chemotherapy.   - wbc: 120/mm3 with ANC: 0, Hb: 7.1 g/dL, and Plt: 6,000/mm3.  - will transfuse 1 unit of platelets today   - tranfuse if Hb < 7 g/dL and Plt < 10,000/mm3 or active bleed.

## 2018-06-14 NOTE — PROGRESS NOTES
[facility]  Hematology  Bone Marrow Transplant  Progress Note    Patient Name: Noreen Mac  Admission Date: 5/24/2018  Hospital Length of Stay: 21 days  Code Status: Full Code    Subjective:     Interval History:   Day 17 of 7+3. Day 14 bone marrow results pending. BC with staph epidermis only sensitive to Vancomycin. Vanc day 3 and cefepime day 4. Vanc trough 12.2 last night. BP remains low but stable. Afebrile x 72 hours. Patient feels a little confused, states she wakes up and doesn't know what time it is or where she is. She has been taking Phenergan, Ativan and Benadryl pre-meds. She also reports with blurry vision when trying to read. No headaches or dizziness.     Objective:     Vital Signs (Most Recent):  Temp: 97.9 °F (36.6 °C) (06/14/18 1147)  Pulse: 74 (06/14/18 1147)  Resp: 18 (06/14/18 1147)  BP: (!) 108/55 (06/14/18 1147)  SpO2: 97 % (06/14/18 1147) Vital Signs (24h Range):  Temp:  [97.7 °F (36.5 °C)-99.6 °F (37.6 °C)] 97.9 °F (36.6 °C)  Pulse:  [74-88] 74  Resp:  [14-18] 18  SpO2:  [92 %-97 %] 97 %  BP: ()/(51-58) 108/55     Weight: 70.1 kg (154 lb 8.7 oz)  Body mass index is 27.38 kg/m².  Body surface area is 1.77 meters squared.      Intake/Output - Last 3 Shifts       06/12 0700 - 06/13 0659 06/13 0700 - 06/14 0659 06/14 0700 - 06/15 0659    P.O. 1200 480     I.V. (mL/kg) 1713.3 (24.4) 2033.3 (29)     Blood   329    IV Piggyback 500 500     Total Intake(mL/kg) 3413.3 (48.7) 3013.3 (43) 329 (4.7)    Urine (mL/kg/hr) 1550 (0.9) 2900 (1.7) 950 (2.7)    Total Output 1550 2900 950    Net +1863.3 +113.3 -621           Urine Occurrence 1 x 1 x     Stool Occurrence 1 x 3 x     Emesis Occurrence  1 x           Physical Exam   Constitutional: She is oriented to person, place, and time. She appears well-developed and well-nourished. No distress.   HENT:   Head: Normocephalic and atraumatic.   Eyes: Pupils are equal, round, and reactive to light.   Neck: Normal range of motion.    Cardiovascular: Normal rate and regular rhythm.    No murmur heard.  Pulmonary/Chest: Effort normal and breath sounds normal. No respiratory distress.   Abdominal: Soft. Bowel sounds are normal. There is no tenderness.   Genitourinary:   Genitourinary Comments: + mild redness, rash resolved    Musculoskeletal: Normal range of motion. She exhibits no edema.   Neurological: She is alert and oriented to person, place, and time.   Skin: Skin is warm and dry.   Vitals reviewed.      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 Ordered routine labs in addition to FLT3, NGS, and AML FISH.  - lo res HLA typing on 5/26/18 and hi res on 5/27/18 done in anticipation of possible need for future transplant   - Pt with two daughters, two full sisters (in their mid 60s, one with brain aneurysm hx, other one without any medical issues), and one full brother (59 y/o healthy).  - ECHO ordered 5/25/18, EF 65%  - PICC line placed 5/25/18   - initially on hydrea, discontinued on 5/28.   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  - started on induction therapy on 5/29/18.  - Day 17 of induction therapy, tolerating treatment fairly well.    - allopurinol stopped 6/11  - prophylactic antimicrobials:  Acyclovir, Voriconazole  - will need CNS Prophylaxis given her elevated WBC (high risk) on admission after count recovery.  - NPM1 +, CEBPA (-), FLT3 (+).  On Midostaurin 50 mg PO BID until Day 14 (held starting 6/8 to 6/11). Will continue til day 21 (6/18)  - day 14 bone marrow biopsy completed 6/11, results pending          Neutropenic fever    - afebrile x 72 hours   - blood cultures from 6/11 at 0430 with gram + cocci, staph epidermis sensitive for Vancomycin, CXR and UA unremarkable.   - Cefepime day 4, Vancomycin day 3   - surveillance blood cultures repeated 6/13 NGTD            Pancytopenia    - secondary to chemotherapy.   - wbc: 120/mm3 with ANC: 0, Hb: 7.1 g/dL, and Plt: 6,000/mm3.  - will transfuse 1 unit of platelets today   - tranfuse if Hb < 7 g/dL and Plt < 10,000/mm3 or active bleed.           Abnormal liver enzymes    - baseline normal  - bilirubin 1.6 today, due to transfusion vs medication (Midostaurin)  - AST 8 and ALT16 today  - Midostaurin restarted 6/11 as liver enzymes normalizing        Hypophosphatemia    - phos 1.2 today  - replace per electrolyte protocol        Hypomagnesemia    - mag 1.0 today  - replace per electrolyte replacement protocol        Vaginal mucositis    - evaluated by derm  - rash essentially resolved and pain much improved          MCTD (mixed connective tissue disease)    - MCTD-NOS  - previously on MTX and Prednisone.   - off therapy now and otherwise doing well.   - can follow up outpatient with her Rheumatologist upon discharge.         Bilateral headaches    - complained of bilateral temporal headaches earlier in hospitalization course  - similar to ones on presentation at OSH  - CT Head done at OSH was unremarkable.   - will consider re-imaging if deemed necessary.   - currently resolved          Pulmonary nodule    - CXR suggestive of 7 mm slightly irregular shaped radiodensity projected over the left upper lung zone which could represent a calcified granuloma  but remains indeterminate.  - CT Chest on 5/28: Multiple scattered noncalcified pulmonary granulomas, the largest measuring 8 mm in the lateral segment of the right middle lobe.   - repeat non-contrast chest CT at 6-12 months         Insomnia    - PRN nightly Xanax.         HTN (hypertension)    - history of SVT and reports intermittent palpitations at times.  - EKG on 5/28 - NSR.  - increased metoprolol XL to 25 mg QD,she feels better with this dose.  - hypotensive x 48 hours  - metoprolol tartrate changed to 25 mg bid (hold for SBP <100)        GERD (gastroesophageal reflux disease)    - takes Omeprazole at home, now on pepcid here  - Protonix added 6/13  - reflux improved today        Osteoporosis    - takes prolia once every 6 months, last given mid April 2018   - no acute issue         Depression    - no acute issue   - continue home Escitalopram             VTE Risk Mitigation         Ordered     heparin, porcine (PF) 100 unit/mL injection flush 300 Units  As needed (PRN)      05/29/18 1024     Place MAYLIN hose  Until discontinued      05/25/18 0032     IP VTE HIGH RISK PATIENT  Once      05/25/18 0032          Disposition: pending count recovery post chemo and day 14 bone marrow results    Sweetie Lepe, NP  Bone Marrow Transplant  [facility]

## 2018-06-14 NOTE — ASSESSMENT & PLAN NOTE
- Admitted from [facility] on 5/25/18 early AM with WBC around 100s, for 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 Ordered routine labs in addition to FLT3, NGS, and AML FISH.  - lo res HLA typing on 5/26/18 and hi res on 5/27/18 done in anticipation of possible need for future transplant   - Pt with two daughters, two full sisters (in their mid 60s, one with brain aneurysm hx, other one without any medical issues), and one full brother (61 y/o healthy).  - ECHO ordered 5/25/18, EF 65%  - PICC line placed 5/25/18   - initially on hydrea, discontinued on 5/28.   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  - started on induction therapy on 5/29/18.  - Day 17 of induction therapy, tolerating treatment fairly well.    - allopurinol stopped 6/11  - prophylactic antimicrobials: Acyclovir, Voriconazole  - will need CNS Prophylaxis given her elevated WBC (high risk) on admission after count recovery.  - NPM1 +, CEBPA (-), FLT3 (+).  On Midostaurin 50 mg PO BID until Day 14 (held starting 6/8 to 6/11). Will continue til day 21 (6/18)  - day 14 bone marrow biopsy completed 6/11, results pending

## 2018-06-14 NOTE — PLAN OF CARE
Problem: Patient Care Overview  Goal: Plan of Care Review  Outcome: Ongoing (interventions implemented as appropriate)  Fall, pressure ulcer, and infection precautions continued. One unit of PLTs given as ordered. Electrolytes replaced as ordered. Anxiety managed with PRN medications. Vancomycin, and Cefepime continued. Bed alarm placed on while family is out of the room. Patient stable, will continue to monitor.

## 2018-06-15 PROBLEM — R78.81 STAPHYLOCOCCUS EPIDERMIDIS BACTEREMIA: Status: ACTIVE | Noted: 2018-05-28

## 2018-06-15 PROBLEM — E83.51 HYPOCALCEMIA: Status: ACTIVE | Noted: 2018-06-15

## 2018-06-15 PROBLEM — E87.6 HYPOKALEMIA: Status: ACTIVE | Noted: 2018-06-15

## 2018-06-15 PROBLEM — B95.7 STAPHYLOCOCCUS EPIDERMIDIS BACTEREMIA: Status: ACTIVE | Noted: 2018-05-28

## 2018-06-15 LAB
ABO + RH BLD: NORMAL
ANISOCYTOSIS BLD QL SMEAR: SLIGHT
BACTERIA BLD CULT: NORMAL
BASOPHILS # BLD AUTO: 0 K/UL
BASOPHILS NFR BLD: 0 %
BLD GP AB SCN CELLS X3 SERPL QL: NORMAL
BLD PROD TYP BPU: NORMAL
BLOOD UNIT EXPIRATION DATE: NORMAL
BLOOD UNIT TYPE CODE: 6200
BLOOD UNIT TYPE: NORMAL
CODING SYSTEM: NORMAL
DIFFERENTIAL METHOD: ABNORMAL
DISPENSE STATUS: NORMAL
EOSINOPHIL # BLD AUTO: 0 K/UL
EOSINOPHIL NFR BLD: 0 %
ERYTHROCYTE [DISTWIDTH] IN BLOOD BY AUTOMATED COUNT: 15.8 %
ESTIMATED PA SYSTOLIC PRESSURE: 36.87
HCT VFR BLD AUTO: 19.5 %
HGB BLD-MCNC: 6.8 G/DL
HYPOCHROMIA BLD QL SMEAR: ABNORMAL
IMM GRANULOCYTES # BLD AUTO: 0 K/UL
IMM GRANULOCYTES NFR BLD AUTO: 0 %
LYMPHOCYTES # BLD AUTO: 0.2 K/UL
LYMPHOCYTES NFR BLD: 93.7 %
MAGNESIUM SERPL-MCNC: 0.9 MG/DL
MAGNESIUM SERPL-MCNC: 1.4 MG/DL
MCH RBC QN AUTO: 30.4 PG
MCHC RBC AUTO-ENTMCNC: 34.9 G/DL
MCV RBC AUTO: 87 FL
MITRAL VALVE REGURGITATION: NORMAL
MONOCYTES # BLD AUTO: 0 K/UL
MONOCYTES NFR BLD: 6.3 %
NEUTROPHILS # BLD AUTO: 0 K/UL
NEUTROPHILS NFR BLD: 0 %
NPM1 FINAL DIAGNOSIS (BONE MARROW): NORMAL
NPM1 SPECIMEN TYPE (BONE MARROW): NORMAL
NRBC BLD-RTO: 0 /100 WBC
NUM UNITS TRANS PACKED RBC: NORMAL
PHOSPHATE SERPL-MCNC: 1.2 MG/DL
PHOSPHATE SERPL-MCNC: 2.9 MG/DL
PLATELET # BLD AUTO: 12 K/UL
PLATELET BLD QL SMEAR: ABNORMAL
PMV BLD AUTO: 12.2 FL
RBC # BLD AUTO: 2.24 M/UL
RETIRED EF AND QEF - SEE NOTES: 60 (ref 55–65)
TRICUSPID VALVE REGURGITATION: NORMAL
WBC # BLD AUTO: 0.16 K/UL

## 2018-06-15 PROCEDURE — 25000003 PHARM REV CODE 250: Performed by: STUDENT IN AN ORGANIZED HEALTH CARE EDUCATION/TRAINING PROGRAM

## 2018-06-15 PROCEDURE — 63600175 PHARM REV CODE 636 W HCPCS: Performed by: STUDENT IN AN ORGANIZED HEALTH CARE EDUCATION/TRAINING PROGRAM

## 2018-06-15 PROCEDURE — 85025 COMPLETE CBC W/AUTO DIFF WBC: CPT

## 2018-06-15 PROCEDURE — 25000003 PHARM REV CODE 250: Performed by: INTERNAL MEDICINE

## 2018-06-15 PROCEDURE — P9038 RBC IRRADIATED: HCPCS

## 2018-06-15 PROCEDURE — 93306 TTE W/DOPPLER COMPLETE: CPT | Mod: 26,,, | Performed by: INTERNAL MEDICINE

## 2018-06-15 PROCEDURE — 80053 COMPREHEN METABOLIC PANEL: CPT

## 2018-06-15 PROCEDURE — 86901 BLOOD TYPING SEROLOGIC RH(D): CPT

## 2018-06-15 PROCEDURE — 93306 TTE W/DOPPLER COMPLETE: CPT

## 2018-06-15 PROCEDURE — 20600001 HC STEP DOWN PRIVATE ROOM

## 2018-06-15 PROCEDURE — A4216 STERILE WATER/SALINE, 10 ML: HCPCS | Performed by: INTERNAL MEDICINE

## 2018-06-15 PROCEDURE — S0073 INJECTION, AZTREONAM, 500 MG: HCPCS | Performed by: INTERNAL MEDICINE

## 2018-06-15 PROCEDURE — 25000003 PHARM REV CODE 250: Performed by: NURSE PRACTITIONER

## 2018-06-15 PROCEDURE — 36430 TRANSFUSION BLD/BLD COMPNT: CPT

## 2018-06-15 PROCEDURE — 84100 ASSAY OF PHOSPHORUS: CPT | Mod: 91

## 2018-06-15 PROCEDURE — 83735 ASSAY OF MAGNESIUM: CPT

## 2018-06-15 PROCEDURE — 63600175 PHARM REV CODE 636 W HCPCS: Performed by: NURSE PRACTITIONER

## 2018-06-15 PROCEDURE — 86920 COMPATIBILITY TEST SPIN: CPT

## 2018-06-15 PROCEDURE — 63600175 PHARM REV CODE 636 W HCPCS: Performed by: HOSPITALIST

## 2018-06-15 PROCEDURE — 63600175 PHARM REV CODE 636 W HCPCS: Performed by: INTERNAL MEDICINE

## 2018-06-15 PROCEDURE — 27201040 HC RC 50 FILTER

## 2018-06-15 PROCEDURE — 99233 SBSQ HOSP IP/OBS HIGH 50: CPT | Mod: ,,, | Performed by: INTERNAL MEDICINE

## 2018-06-15 RX ORDER — DEXAMETHASONE 4 MG/1
12 TABLET ORAL
Status: COMPLETED | OUTPATIENT
Start: 2018-06-16 | End: 2018-06-16

## 2018-06-15 RX ORDER — ONDANSETRON 4 MG/1
16 TABLET, FILM COATED ORAL
Status: DISCONTINUED | OUTPATIENT
Start: 2018-06-16 | End: 2018-06-20

## 2018-06-15 RX ORDER — SODIUM CHLORIDE 0.9 % (FLUSH) 0.9 %
10 SYRINGE (ML) INJECTION
Status: DISCONTINUED | OUTPATIENT
Start: 2018-06-15 | End: 2018-07-09

## 2018-06-15 RX ORDER — LANOLIN ALCOHOL/MO/W.PET/CERES
400 CREAM (GRAM) TOPICAL 2 TIMES DAILY
Status: DISCONTINUED | OUTPATIENT
Start: 2018-06-15 | End: 2018-06-17

## 2018-06-15 RX ORDER — MAGNESIUM SULFATE HEPTAHYDRATE 40 MG/ML
4 INJECTION, SOLUTION INTRAVENOUS ONCE
Status: COMPLETED | OUTPATIENT
Start: 2018-06-15 | End: 2018-06-15

## 2018-06-15 RX ORDER — HEPARIN 100 UNIT/ML
300 SYRINGE INTRAVENOUS
Status: DISCONTINUED | OUTPATIENT
Start: 2018-06-15 | End: 2018-07-13 | Stop reason: HOSPADM

## 2018-06-15 RX ORDER — HYDROCODONE BITARTRATE AND ACETAMINOPHEN 500; 5 MG/1; MG/1
TABLET ORAL
Status: DISCONTINUED | OUTPATIENT
Start: 2018-06-15 | End: 2018-06-19

## 2018-06-15 RX ORDER — DEXAMETHASONE SODIUM PHOSPHATE 1 MG/ML
1 SOLUTION/ DROPS OPHTHALMIC EVERY 6 HOURS
Status: COMPLETED | OUTPATIENT
Start: 2018-06-17 | End: 2018-06-23

## 2018-06-15 RX ORDER — CALCIUM CARBONATE 200(500)MG
1000 TABLET,CHEWABLE ORAL 2 TIMES DAILY
Status: DISCONTINUED | OUTPATIENT
Start: 2018-06-15 | End: 2018-06-16

## 2018-06-15 RX ORDER — AZTREONAM 2 G/1
2000 INJECTION, POWDER, LYOPHILIZED, FOR SOLUTION INTRAMUSCULAR; INTRAVENOUS
Status: DISCONTINUED | OUTPATIENT
Start: 2018-06-15 | End: 2018-06-15

## 2018-06-15 RX ADMIN — FAMOTIDINE 20 MG: 20 TABLET ORAL at 08:06

## 2018-06-15 RX ADMIN — CEFEPIME 2 G: 2 INJECTION, POWDER, FOR SOLUTION INTRAVENOUS at 04:06

## 2018-06-15 RX ADMIN — POTASSIUM CHLORIDE 20 MEQ: 750 CAPSULE, EXTENDED RELEASE ORAL at 10:06

## 2018-06-15 RX ADMIN — VORICONAZOLE 200 MG: 200 TABLET, FILM COATED ORAL at 09:06

## 2018-06-15 RX ADMIN — POTASSIUM CHLORIDE 20 MEQ: 750 CAPSULE, EXTENDED RELEASE ORAL at 08:06

## 2018-06-15 RX ADMIN — PANTOPRAZOLE SODIUM 40 MG: 40 TABLET, DELAYED RELEASE ORAL at 08:06

## 2018-06-15 RX ADMIN — Medication 10 ML: at 12:06

## 2018-06-15 RX ADMIN — POTASSIUM & SODIUM PHOSPHATES POWDER PACK 280-160-250 MG 2 PACKET: 280-160-250 PACK at 02:06

## 2018-06-15 RX ADMIN — ACYCLOVIR 400 MG: 200 CAPSULE ORAL at 08:06

## 2018-06-15 RX ADMIN — POTASSIUM & SODIUM PHOSPHATES POWDER PACK 280-160-250 MG 2 PACKET: 280-160-250 PACK at 06:06

## 2018-06-15 RX ADMIN — ONDANSETRON 8 MG: 2 INJECTION INTRAMUSCULAR; INTRAVENOUS at 04:06

## 2018-06-15 RX ADMIN — Medication 400 MG: at 08:06

## 2018-06-15 RX ADMIN — ACETAMINOPHEN 650 MG: 325 TABLET, FILM COATED ORAL at 10:06

## 2018-06-15 RX ADMIN — POTASSIUM CHLORIDE 20 MEQ: 750 CAPSULE, EXTENDED RELEASE ORAL at 06:06

## 2018-06-15 RX ADMIN — SODIUM PHOSPHATE, MONOBASIC, MONOHYDRATE 30 MMOL: 276; 142 INJECTION, SOLUTION INTRAVENOUS at 11:06

## 2018-06-15 RX ADMIN — CALCIUM CARBONATE (ANTACID) CHEW TAB 500 MG 1000 MG: 500 CHEW TAB at 09:06

## 2018-06-15 RX ADMIN — SODIUM CHLORIDE, PRESERVATIVE FREE 10 ML: 5 INJECTION INTRAVENOUS at 04:06

## 2018-06-15 RX ADMIN — AZTREONAM 2000 MG: 2 INJECTION, POWDER, LYOPHILIZED, FOR SOLUTION INTRAMUSCULAR; INTRAVENOUS at 11:06

## 2018-06-15 RX ADMIN — DIPHENHYDRAMINE HYDROCHLORIDE 12.5 MG: 50 INJECTION, SOLUTION INTRAMUSCULAR; INTRAVENOUS at 10:06

## 2018-06-15 RX ADMIN — FILGRASTIM 480 MCG: 480 INJECTION, SOLUTION INTRAVENOUS; SUBCUTANEOUS at 07:06

## 2018-06-15 RX ADMIN — ONDANSETRON 8 MG: 2 INJECTION INTRAMUSCULAR; INTRAVENOUS at 06:06

## 2018-06-15 RX ADMIN — METOPROLOL TARTRATE 25 MG: 25 TABLET, FILM COATED ORAL at 09:06

## 2018-06-15 RX ADMIN — METOPROLOL TARTRATE 25 MG: 25 TABLET, FILM COATED ORAL at 08:06

## 2018-06-15 RX ADMIN — RAMELTEON 8 MG: 8 TABLET, FILM COATED ORAL at 09:06

## 2018-06-15 RX ADMIN — MAGNESIUM SULFATE IN WATER 4 G: 40 INJECTION, SOLUTION INTRAVENOUS at 06:06

## 2018-06-15 RX ADMIN — POTASSIUM & SODIUM PHOSPHATES POWDER PACK 280-160-250 MG 2 PACKET: 280-160-250 PACK at 10:06

## 2018-06-15 RX ADMIN — VORICONAZOLE 200 MG: 200 TABLET, FILM COATED ORAL at 08:06

## 2018-06-15 RX ADMIN — Medication 10 ML: at 07:06

## 2018-06-15 RX ADMIN — FAMOTIDINE 20 MG: 20 TABLET ORAL at 09:06

## 2018-06-15 RX ADMIN — ALPRAZOLAM 0.25 MG: 0.25 TABLET ORAL at 04:06

## 2018-06-15 RX ADMIN — Medication 10 ML: at 04:06

## 2018-06-15 RX ADMIN — ACYCLOVIR 400 MG: 200 CAPSULE ORAL at 09:06

## 2018-06-15 RX ADMIN — ESCITALOPRAM 10 MG: 5 TABLET, FILM COATED ORAL at 08:06

## 2018-06-15 RX ADMIN — Medication 10 ML: at 08:06

## 2018-06-15 RX ADMIN — Medication 400 MG: at 09:06

## 2018-06-15 RX ADMIN — Medication 1 G: at 10:06

## 2018-06-15 RX ADMIN — Medication 1 G: at 09:06

## 2018-06-15 RX ADMIN — AZTREONAM 2000 MG: 2 INJECTION, POWDER, LYOPHILIZED, FOR SOLUTION INTRAMUSCULAR; INTRAVENOUS at 02:06

## 2018-06-15 RX ADMIN — MORPHINE SULFATE 15 MG: 15 TABLET ORAL at 07:06

## 2018-06-15 NOTE — ASSESSMENT & PLAN NOTE
- history of SVT and reports intermittent palpitations at times.  - EKG on 5/28 - NSR.  - increased metoprolol XL to 25 mg QD,she feels better with this dose.  - hypotensive x 48 hours--improved today  - metoprolol tartrate changed on 6/13 to 25 mg bid (hold for SBP <100)

## 2018-06-15 NOTE — PLAN OF CARE
Problem: Patient Care Overview  Goal: Plan of Care Review  Outcome: Ongoing (interventions implemented as appropriate)  Plan of care reviewed with patient and family.  Fall precautions maintained , side rails upx2, call light in reach, bed in low position and locked, nonskid socks on.  Has iv fluids infusing without difficulty. Received 1 unit of blood today.    Received electrolye replacements today.  Received nausea medication zofran 8mg iv for nausea.  Family at the bedside.

## 2018-06-15 NOTE — PLAN OF CARE
Problem: Patient Care Overview  Goal: Plan of Care Review  Outcome: Ongoing (interventions implemented as appropriate)  Patient is progressing and involved with plan of care. Frequent rounds made to assess pain and safety. Pt communicating needs throughout shift. Up ad gunnar. Tolerating diet, voiding without difficulty. Pain controlled with PRN morphine X1. Cefepime and vanc continued as ordered. IVF continued as ordered. Pt requested no interruptions between 2200 and 0400.  T-max 100.4 but did not sustain for over 1 hr.  All other vitals stable; no acute events this shift. Pt. Remaining free from falls or injury throughout shift; bed in lowest position; side rails up X2; call light within reach; pt instructed to call for assistance as needed - verbalized understanding. Q2h rounding on patient. Will continue to monitor.

## 2018-06-15 NOTE — PROGRESS NOTES
[facility]  Hematology  Bone Marrow Transplant  Progress Note    Patient Name: Noreen Mac  Admission Date: 5/24/2018  Hospital Length of Stay: 22 days  Code Status: Full Code    Subjective:     Interval History:   Day 18 of 7+3. Day 14 bone marrow biopsy shows persistent disease with 15% blasts. Discussion with patient regarding treatment and she is deciding if she wants to proceed with re-induction vs outpatient maintenance. Temp of 100.4 overnight, unsustained. Day 5 Cefepime and Day 4 of Vanc for staph epidermis. Repeat cultures NGTD. BP improved. Electrolytes (mag, phos, K and calcium) replaced aggressively this am and will repeat labs this afternoon. No complaints this am.     Objective:     Vital Signs (Most Recent):  Temp: 98.7 °F (37.1 °C) (06/15/18 0840)  Pulse: 77 (06/15/18 0840)  Resp: 18 (06/15/18 0840)  BP: (!) 110/55 (06/15/18 0840)  SpO2: 96 % (06/15/18 0840) Vital Signs (24h Range):  Temp:  [97.9 °F (36.6 °C)-100.4 °F (38 °C)] 98.7 °F (37.1 °C)  Pulse:  [74-89] 77  Resp:  [18-19] 18  SpO2:  [95 %-99 %] 96 %  BP: (108-135)/(55-61) 110/55     Weight: 74.6 kg (164 lb 9.2 oz)  Body mass index is 29.15 kg/m².  Body surface area is 1.82 meters squared.      Intake/Output - Last 3 Shifts       06/13 0700 - 06/14 0659 06/14 0700 - 06/15 0659 06/15 0700 - 06/16 0659    P.O. 480 120 180    I.V. (mL/kg) 2033.3 (29) 2585 (34.7)     Blood  329     IV Piggyback 500 750     Total Intake(mL/kg) 3013.3 (43) 3784 (50.7) 180 (2.4)    Urine (mL/kg/hr) 2900 (1.7) 2200 (1.2)     Total Output 2900 2200      Net +113.3 +1584 +180           Urine Occurrence 1 x 4 x     Stool Occurrence 3 x 1 x     Emesis Occurrence 1 x            Physical Exam   Constitutional: She is oriented to person, place, and time. She appears well-developed and well-nourished. No distress.   HENT:   Head: Normocephalic and atraumatic.   Eyes: Pupils are equal, round, and reactive to light.   Neck: Normal range of motion.    Cardiovascular: Normal rate and regular rhythm.    No murmur heard.  Pulmonary/Chest: Effort normal and breath sounds normal. No respiratory distress.   Abdominal: Soft. Bowel sounds are normal. There is no tenderness.   Genitourinary:   Genitourinary Comments: + mild redness, rash resolved    Musculoskeletal: Normal range of motion. She exhibits no edema.   Neurological: She is alert and oriented to person, place, and time.   Skin: Skin is warm and dry.   Vitals reviewed.      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 Ordered routine labs in addition to FLT3, NGS, and AML FISH.  - lo res HLA typing on 5/26/18 and hi res on 5/27/18 done in anticipation of possible need for future transplant   - Pt with two daughters, two full sisters (in their mid 60s, one with brain aneurysm hx, other one without any medical issues), and one full brother (59 y/o healthy).  - ECHO ordered 5/25/18, EF 65%  - PICC line placed 5/25/18   - initially on hydrea, discontinued on 5/28.   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  - started on induction therapy on 5/29/18.  - Day 18 of induction therapy, tolerating treatment fairly well.    - allopurinol stopped 6/11  - prophylactic antimicrobials:  Acyclovir, Voriconazole  - will need CNS Prophylaxis given her elevated WBC (high risk) on admission after count recovery.  - NPM1 +, CEBPA (-), FLT3 (+).  On Midostaurin 50 mg PO BID until Day 14 (held starting 6/8 to 6/11). Will continue til day 21 (6/18) or until re-induction  - day 14 bone marrow biopsy completed 6/11 showing persistent disease with 15% CD 34 positive blasts  - plan for re-induction with FLAG-JENNIFER vs outpatient maintenance with hypomethylation agent. Patient is discussing her options with family and will decide on therapy  - will repeat 2D echo today in anticipation of FLAG-JENNIFER          Staphylococcus epidermidis bacteremia    - one unsustained temp of 100.4 overnight   - blood cultures from 6/11 at 0430 with gram + cocci, staph epidermis sensitive for Vancomycin, CXR and UA unremarkable.   - Cefepime day 5, Vancomycin day 4  - surveillance blood cultures repeated 6/13 NGTD            Pancytopenia    - secondary to chemotherapy.   - wbc: 160/mm3 with ANC: 0, Hb: 6.8 g/dL, and Plt: 12,000/mm3.  - will transfuse 1 unit of PRBC today   - tranfuse if Hb < 7 g/dL and Plt < 10,000/mm3 or active bleed.           Abnormal liver enzymes    - baseline normal  - bilirubin 1.9 today (1.6<1.6), due to transfusion vs medication (Midostaurin)  - AST 9 and ALT11 today  - Midostaurin restarted 6/11 as liver enzymes normalizing        Hypocalcemia    - calcium 6.4 today, corrected calcium 8.2  - will start calcium carbonate 1000 mg bid        Hypokalemia    - K 3.1 today  - replace per protocol        Hypophosphatemia    - phos 1.2 today  - given 30 mm of IV phos this am  - will repeat Phos level at 4 pm        Hypomagnesemia    - mag 0.9 today  - given 4 gram Mag IV this am  - will repeat Mag level at 4 pm        Vaginal mucositis    - evaluated by derm  - rash essentially resolved and pain much improved          MCTD (mixed connective tissue disease)    - MCTD-NOS  - previously on MTX and Prednisone.   - off  therapy now and otherwise doing well.   - can follow up outpatient with her Rheumatologist upon discharge.         Bilateral headaches    - complained of bilateral temporal headaches earlier in hospitalization course  - similar to ones on presentation at OSH  - CT Head done at OSH was unremarkable.   - will consider re-imaging if deemed necessary.   - currently resolved          Pulmonary nodule    - CXR suggestive of 7 mm slightly irregular shaped radiodensity projected over the left upper lung zone which could represent a calcified granuloma but remains indeterminate.  - CT Chest on 5/28: Multiple scattered noncalcified pulmonary granulomas, the largest measuring 8 mm in the lateral segment of the right middle lobe.   - repeat non-contrast chest CT at 6-12 months         Insomnia    - PRN nightly Xanax.         HTN (hypertension)    - history of SVT and reports intermittent palpitations at times.  - EKG on 5/28 - NSR.  - increased metoprolol XL to 25 mg QD,she feels better with this dose.  - hypotensive x 48 hours--improved today  - metoprolol tartrate changed on 6/13 to 25 mg bid (hold for SBP <100)        GERD (gastroesophageal reflux disease)    - takes Omeprazole at home, now on pepcid here  - Protonix added 6/13  - reflux improved         Osteoporosis    - takes prolia once every 6 months, last given mid April 2018   - no acute issue         Depression    - no acute issue   - continue home Escitalopram             VTE Risk Mitigation         Ordered     heparin, porcine (PF) 100 unit/mL injection flush 300 Units  As needed (PRN)      05/29/18 1024     Place MAYLIN hose  Until discontinued      05/25/18 0032     IP VTE HIGH RISK PATIENT  Once      05/25/18 0032          Disposition: pending possible re-induction and count recovery    Sweetie Lepe NP  Bone Marrow Transplant  [facility]

## 2018-06-15 NOTE — ASSESSMENT & PLAN NOTE
- secondary to chemotherapy.   - wbc: 160/mm3 with ANC: 0, Hb: 6.8 g/dL, and Plt: 12,000/mm3.  - will transfuse 1 unit of PRBC today   - tranfuse if Hb < 7 g/dL and Plt < 10,000/mm3 or active bleed.

## 2018-06-15 NOTE — SUBJECTIVE & OBJECTIVE
Subjective:     Interval History:   Day 18 of 7+3. Day 14 bone marrow biopsy shows persistent disease with 15% blasts. Discussion with patient regarding treatment and she is deciding if she wants to proceed with re-induction vs outpatient maintenance. Temp of 100.4 overnight, unsustained. Day 5 Cefepime and Day 4 of Vanc for staph epidermis. Repeat cultures NGTD. BP improved. Electrolytes (mag, phos, K and calcium) replaced aggressively this am and will repeat labs this afternoon. No complaints this am.     Objective:     Vital Signs (Most Recent):  Temp: 98.7 °F (37.1 °C) (06/15/18 0840)  Pulse: 77 (06/15/18 0840)  Resp: 18 (06/15/18 0840)  BP: (!) 110/55 (06/15/18 0840)  SpO2: 96 % (06/15/18 0840) Vital Signs (24h Range):  Temp:  [97.9 °F (36.6 °C)-100.4 °F (38 °C)] 98.7 °F (37.1 °C)  Pulse:  [74-89] 77  Resp:  [18-19] 18  SpO2:  [95 %-99 %] 96 %  BP: (108-135)/(55-61) 110/55     Weight: 74.6 kg (164 lb 9.2 oz)  Body mass index is 29.15 kg/m².  Body surface area is 1.82 meters squared.      Intake/Output - Last 3 Shifts       06/13 0700 - 06/14 0659 06/14 0700 - 06/15 0659 06/15 0700 - 06/16 0659    P.O. 480 120 180    I.V. (mL/kg) 2033.3 (29) 2585 (34.7)     Blood  329     IV Piggyback 500 750     Total Intake(mL/kg) 3013.3 (43) 3784 (50.7) 180 (2.4)    Urine (mL/kg/hr) 2900 (1.7) 2200 (1.2)     Total Output 2900 2200      Net +113.3 +1584 +180           Urine Occurrence 1 x 4 x     Stool Occurrence 3 x 1 x     Emesis Occurrence 1 x            Physical Exam   Constitutional: She is oriented to person, place, and time. She appears well-developed and well-nourished. No distress.   HENT:   Head: Normocephalic and atraumatic.   Eyes: Pupils are equal, round, and reactive to light.   Neck: Normal range of motion.   Cardiovascular: Normal rate and regular rhythm.    No murmur heard.  Pulmonary/Chest: Effort normal and breath sounds normal. No respiratory distress.   Abdominal: Soft. Bowel sounds are normal. There is  no tenderness.   Genitourinary:   Genitourinary Comments: + mild redness, rash resolved    Musculoskeletal: Normal range of motion. She exhibits no edema.   Neurological: She is alert and oriented to person, place, and time.   Skin: Skin is warm and dry.   Vitals reviewed.      Significant Labs:   CBC:   Recent Labs  Lab 06/14/18  0349 06/15/18  0400   WBC 0.12* 0.16*   HGB 7.1* 6.8*   HCT 20.4* 19.5*   PLT 6* 12*    and CMP:   Recent Labs  Lab 06/14/18  0349 06/15/18  0400    136   K 3.6 3.1*    111*   CO2 19* 19*   GLU 88 92   BUN 12 8   CREATININE 0.6 0.6   CALCIUM 7.0* 6.4*   PROT 4.5* 4.4*   ALBUMIN 1.7* 1.7*   BILITOT 1.6* 1.9*   ALKPHOS 430* 496*   AST 8* 9*   ALT 16 11   ANIONGAP 7* 6*   EGFRNONAA >60.0 >60.0       Diagnostic Results:  None

## 2018-06-15 NOTE — PLAN OF CARE
MDR's with Dr Lugo.  Patient is day 18 of 7+3.  ANC 0.  Day 14 BMB shows persistent AML.  The patient plans to  discuss treatment options with her family today.  All options were discussed with patient and her daughter on rounds.  D/c not anticipated this weekend.  Will continue to follow.

## 2018-06-15 NOTE — NURSING
Patient is receiving 1 unit of blood at this time, premeds of tylenol 650mg po and benadryl 12.5mg ivpb prior.  Initially started at 100cc and increased to 125cc after 15 min.  Vitals.  No complaints voiced.

## 2018-06-15 NOTE — ASSESSMENT & PLAN NOTE
- Admitted from [facility] on 5/25/18 early AM with WBC around 100s, for 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 Ordered routine labs in addition to FLT3, NGS, and AML FISH.  - lo res HLA typing on 5/26/18 and hi res on 5/27/18 done in anticipation of possible need for future transplant   - Pt with two daughters, two full sisters (in their mid 60s, one with brain aneurysm hx, other one without any medical issues), and one full brother (59 y/o healthy).  - ECHO ordered 5/25/18, EF 65%  - PICC line placed 5/25/18   - initially on hydrea, discontinued on 5/28.   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  - started on induction therapy on 5/29/18.  - Day 18 of induction therapy, tolerating treatment fairly well.    - allopurinol stopped 6/11  - prophylactic antimicrobials: Acyclovir, Voriconazole  - will need CNS Prophylaxis given her elevated WBC (high risk) on admission after count recovery.  - NPM1 +, CEBPA (-), FLT3 (+).  On Midostaurin 50 mg PO BID until Day 14 (held starting 6/8 to 6/11). Will continue til day 21 (6/18) or until re-induction  - day 14 bone marrow biopsy completed 6/11 showing persistent disease with 15% CD 34 positive blasts  - plan for re-induction with FLAG-JENNIFER vs outpatient maintenance with hypomethylation agent. Patient is discussing her options with family and will decide on therapy  - will repeat 2D echo today in anticipation of FLAG-JENNIFER

## 2018-06-15 NOTE — ASSESSMENT & PLAN NOTE
- one unsustained temp of 100.4 overnight   - blood cultures from 6/11 at 0430 with gram + cocci, staph epidermis sensitive for Vancomycin, CXR and UA unremarkable.   - Cefepime day 5, Vancomycin day 4  - surveillance blood cultures repeated 6/13 NGTD

## 2018-06-16 PROBLEM — E87.6 HYPOKALEMIA: Status: RESOLVED | Noted: 2018-06-15 | Resolved: 2018-06-16

## 2018-06-16 PROBLEM — N76.81: Status: RESOLVED | Noted: 2018-06-05 | Resolved: 2018-06-16

## 2018-06-16 LAB
25(OH)D3+25(OH)D2 SERPL-MCNC: 17 NG/ML
ALBUMIN SERPL BCP-MCNC: 1.8 G/DL
ALP SERPL-CCNC: 1126 U/L
ALT SERPL W/O P-5'-P-CCNC: 16 U/L
ANION GAP SERPL CALC-SCNC: 5 MMOL/L
ANISOCYTOSIS BLD QL SMEAR: SLIGHT
AST SERPL-CCNC: 21 U/L
BASOPHILS # BLD AUTO: 0 K/UL
BASOPHILS NFR BLD: 0 %
BILIRUB SERPL-MCNC: 2.5 MG/DL
BLD PROD TYP BPU: NORMAL
BLOOD UNIT EXPIRATION DATE: NORMAL
BLOOD UNIT TYPE CODE: 6200
BLOOD UNIT TYPE: NORMAL
BUN SERPL-MCNC: 8 MG/DL
CALCIUM SERPL-MCNC: 6.7 MG/DL
CHLORIDE SERPL-SCNC: 111 MMOL/L
CO2 SERPL-SCNC: 22 MMOL/L
CODING SYSTEM: NORMAL
CREAT SERPL-MCNC: 0.6 MG/DL
DIFFERENTIAL METHOD: ABNORMAL
DISPENSE STATUS: NORMAL
EOSINOPHIL # BLD AUTO: 0 K/UL
EOSINOPHIL NFR BLD: 0 %
ERYTHROCYTE [DISTWIDTH] IN BLOOD BY AUTOMATED COUNT: 16 %
EST. GFR  (AFRICAN AMERICAN): >60 ML/MIN/1.73 M^2
EST. GFR  (NON AFRICAN AMERICAN): >60 ML/MIN/1.73 M^2
GLUCOSE SERPL-MCNC: 98 MG/DL
HCT VFR BLD AUTO: 22.9 %
HGB BLD-MCNC: 8.1 G/DL
HYPOCHROMIA BLD QL SMEAR: ABNORMAL
IMM GRANULOCYTES # BLD AUTO: 0.01 K/UL
IMM GRANULOCYTES NFR BLD AUTO: 3.1 %
LYMPHOCYTES # BLD AUTO: 0.2 K/UL
LYMPHOCYTES NFR BLD: 71.9 %
MAGNESIUM SERPL-MCNC: 1.5 MG/DL
MCH RBC QN AUTO: 31.3 PG
MCHC RBC AUTO-ENTMCNC: 35.4 G/DL
MCV RBC AUTO: 88 FL
MONOCYTES # BLD AUTO: 0 K/UL
MONOCYTES NFR BLD: 9.4 %
NEUTROPHILS # BLD AUTO: 0.1 K/UL
NEUTROPHILS NFR BLD: 15.6 %
NRBC BLD-RTO: 0 /100 WBC
NUM UNITS TRANS WBC-POOR PLATPHERESIS: NORMAL
PHOSPHATE SERPL-MCNC: 1.3 MG/DL
PLATELET # BLD AUTO: 6 K/UL
PLATELET BLD QL SMEAR: ABNORMAL
PMV BLD AUTO: ABNORMAL FL
POIKILOCYTOSIS BLD QL SMEAR: ABNORMAL
POTASSIUM SERPL-SCNC: 4.1 MMOL/L
PROT SERPL-MCNC: 4.6 G/DL
RBC # BLD AUTO: 2.59 M/UL
SODIUM SERPL-SCNC: 138 MMOL/L
VANCOMYCIN TROUGH SERPL-MCNC: 10.4 UG/ML
WBC # BLD AUTO: 0.32 K/UL

## 2018-06-16 PROCEDURE — P9037 PLATE PHERES LEUKOREDU IRRAD: HCPCS

## 2018-06-16 PROCEDURE — 20600001 HC STEP DOWN PRIVATE ROOM

## 2018-06-16 PROCEDURE — 80053 COMPREHEN METABOLIC PANEL: CPT

## 2018-06-16 PROCEDURE — 25000003 PHARM REV CODE 250: Performed by: NURSE PRACTITIONER

## 2018-06-16 PROCEDURE — 84100 ASSAY OF PHOSPHORUS: CPT

## 2018-06-16 PROCEDURE — 85025 COMPLETE CBC W/AUTO DIFF WBC: CPT

## 2018-06-16 PROCEDURE — S0073 INJECTION, AZTREONAM, 500 MG: HCPCS | Performed by: INTERNAL MEDICINE

## 2018-06-16 PROCEDURE — 99233 SBSQ HOSP IP/OBS HIGH 50: CPT | Mod: ,,, | Performed by: INTERNAL MEDICINE

## 2018-06-16 PROCEDURE — 25000003 PHARM REV CODE 250: Performed by: INTERNAL MEDICINE

## 2018-06-16 PROCEDURE — 83735 ASSAY OF MAGNESIUM: CPT

## 2018-06-16 PROCEDURE — 63600175 PHARM REV CODE 636 W HCPCS: Mod: JG | Performed by: INTERNAL MEDICINE

## 2018-06-16 PROCEDURE — 25000003 PHARM REV CODE 250: Performed by: HOSPITALIST

## 2018-06-16 PROCEDURE — 80202 ASSAY OF VANCOMYCIN: CPT

## 2018-06-16 PROCEDURE — A4216 STERILE WATER/SALINE, 10 ML: HCPCS | Performed by: INTERNAL MEDICINE

## 2018-06-16 PROCEDURE — 82306 VITAMIN D 25 HYDROXY: CPT

## 2018-06-16 PROCEDURE — 25000003 PHARM REV CODE 250: Performed by: STUDENT IN AN ORGANIZED HEALTH CARE EDUCATION/TRAINING PROGRAM

## 2018-06-16 PROCEDURE — 63600175 PHARM REV CODE 636 W HCPCS: Performed by: NURSE PRACTITIONER

## 2018-06-16 RX ORDER — HYDROCODONE BITARTRATE AND ACETAMINOPHEN 500; 5 MG/1; MG/1
TABLET ORAL
Status: DISCONTINUED | OUTPATIENT
Start: 2018-06-16 | End: 2018-06-19

## 2018-06-16 RX ORDER — CHOLECALCIFEROL (VITAMIN D3) 25 MCG
5000 TABLET ORAL DAILY
Status: DISCONTINUED | OUTPATIENT
Start: 2018-06-16 | End: 2018-07-13

## 2018-06-16 RX ORDER — CALCIUM CARBONATE 200(500)MG
1000 TABLET,CHEWABLE ORAL 3 TIMES DAILY
Status: DISCONTINUED | OUTPATIENT
Start: 2018-06-16 | End: 2018-06-26

## 2018-06-16 RX ADMIN — FAMOTIDINE 20 MG: 20 TABLET ORAL at 08:06

## 2018-06-16 RX ADMIN — VORICONAZOLE 200 MG: 200 TABLET, FILM COATED ORAL at 08:06

## 2018-06-16 RX ADMIN — SODIUM CHLORIDE: 0.9 INJECTION, SOLUTION INTRAVENOUS at 09:06

## 2018-06-16 RX ADMIN — SODIUM CHLORIDE: 0.9 INJECTION, SOLUTION INTRAVENOUS at 11:06

## 2018-06-16 RX ADMIN — VITAMIN D, TAB 1000IU (100/BT) 5000 UNITS: 25 TAB at 11:06

## 2018-06-16 RX ADMIN — Medication 400 MG: at 05:06

## 2018-06-16 RX ADMIN — ACYCLOVIR 400 MG: 200 CAPSULE ORAL at 08:06

## 2018-06-16 RX ADMIN — AZTREONAM 2000 MG: 2 INJECTION, POWDER, LYOPHILIZED, FOR SOLUTION INTRAMUSCULAR; INTRAVENOUS at 11:06

## 2018-06-16 RX ADMIN — Medication 400 MG: at 09:06

## 2018-06-16 RX ADMIN — CALCIUM CARBONATE (ANTACID) CHEW TAB 500 MG 1000 MG: 500 CHEW TAB at 08:06

## 2018-06-16 RX ADMIN — Medication 400 MG: at 08:06

## 2018-06-16 RX ADMIN — Medication 10 ML: at 08:06

## 2018-06-16 RX ADMIN — POTASSIUM & SODIUM PHOSPHATES POWDER PACK 280-160-250 MG 2 PACKET: 280-160-250 PACK at 09:06

## 2018-06-16 RX ADMIN — Medication 10 ML: at 11:06

## 2018-06-16 RX ADMIN — ACETAMINOPHEN 650 MG: 325 TABLET, FILM COATED ORAL at 05:06

## 2018-06-16 RX ADMIN — AZTREONAM 2000 MG: 2 INJECTION, POWDER, LYOPHILIZED, FOR SOLUTION INTRAMUSCULAR; INTRAVENOUS at 08:06

## 2018-06-16 RX ADMIN — CALCIUM CARBONATE (ANTACID) CHEW TAB 500 MG 1000 MG: 500 CHEW TAB at 02:06

## 2018-06-16 RX ADMIN — METOPROLOL TARTRATE 25 MG: 25 TABLET, FILM COATED ORAL at 08:06

## 2018-06-16 RX ADMIN — Medication 1 G: at 10:06

## 2018-06-16 RX ADMIN — Medication 400 MG: at 01:06

## 2018-06-16 RX ADMIN — ONDANSETRON 16 MG: 4 TABLET, FILM COATED ORAL at 06:06

## 2018-06-16 RX ADMIN — PANTOPRAZOLE SODIUM 40 MG: 40 TABLET, DELAYED RELEASE ORAL at 08:06

## 2018-06-16 RX ADMIN — DIPHENHYDRAMINE HYDROCHLORIDE 12.5 MG: 50 INJECTION, SOLUTION INTRAMUSCULAR; INTRAVENOUS at 05:06

## 2018-06-16 RX ADMIN — Medication 1 G: at 09:06

## 2018-06-16 RX ADMIN — AZTREONAM 2000 MG: 2 INJECTION, POWDER, LYOPHILIZED, FOR SOLUTION INTRAMUSCULAR; INTRAVENOUS at 04:06

## 2018-06-16 RX ADMIN — Medication 10 ML: at 04:06

## 2018-06-16 RX ADMIN — ONDANSETRON 8 MG: 2 INJECTION INTRAMUSCULAR; INTRAVENOUS at 04:06

## 2018-06-16 RX ADMIN — FILGRASTIM 480 MCG: 480 INJECTION, SOLUTION INTRAVENOUS; SUBCUTANEOUS at 06:06

## 2018-06-16 RX ADMIN — Medication 10 ML: at 01:06

## 2018-06-16 RX ADMIN — DEXAMETHASONE 12 MG: 4 TABLET ORAL at 06:06

## 2018-06-16 RX ADMIN — POTASSIUM & SODIUM PHOSPHATES POWDER PACK 280-160-250 MG 2 PACKET: 280-160-250 PACK at 05:06

## 2018-06-16 RX ADMIN — SODIUM CHLORIDE 18 MG: 9 INJECTION, SOLUTION INTRAVENOUS at 07:06

## 2018-06-16 RX ADMIN — ESCITALOPRAM 10 MG: 5 TABLET, FILM COATED ORAL at 08:06

## 2018-06-16 RX ADMIN — POTASSIUM & SODIUM PHOSPHATES POWDER PACK 280-160-250 MG 2 PACKET: 280-160-250 PACK at 01:06

## 2018-06-16 RX ADMIN — FLUDARABINE PHOSPHATE 55 MG: 25 INJECTION, SOLUTION INTRAVENOUS at 08:06

## 2018-06-16 NOTE — ASSESSMENT & PLAN NOTE
- blood cultures from 6/11 at 0430 with gram + cocci, staph epidermis sensitive for Vancomycin, CXR and UA unremarkable.   - Continue vancomycin with projected end date of 6/27  - Pending vancomycin trough  - surveillance blood cultures repeated 6/13 NGTD  - de-escalate aztreonam back to prophylactic cipro 6/16

## 2018-06-16 NOTE — ASSESSMENT & PLAN NOTE
- corrected calcium remains low despite PO replacement therapy  - pending vitamin D level  - increase calcium carbonate to 1000 mg tid

## 2018-06-16 NOTE — ASSESSMENT & PLAN NOTE
- baseline normal  - bilirubin continues to rise up to 2.5 today  - LFTs wnl except for ALP of 1126  - holding midostaurin while patient gets induction chemo

## 2018-06-16 NOTE — SUBJECTIVE & OBJECTIVE
Subjective:     Interval History:   Day 19 of 7+3. Day 2 of Flag-JENNIFER. Remains afebrile. Calcium remains low and have increased PO supplementation. Remains on vanc and aztreonam. Somewhat loopy feeling after receiving benadryl.     Objective:     Vital Signs (Most Recent):  Temp: 98.2 °F (36.8 °C) (06/16/18 0821)  Pulse: 71 (06/16/18 0821)  Resp: 17 (06/16/18 0821)  BP: (!) 92/53 (06/16/18 0821)  SpO2: (!) 94 % (06/16/18 0821) Vital Signs (24h Range):  Temp:  [97.7 °F (36.5 °C)-99 °F (37.2 °C)] 98.2 °F (36.8 °C)  Pulse:  [71-92] 71  Resp:  [17-18] 17  SpO2:  [93 %-97 %] 94 %  BP: ()/(51-58) 92/53     Weight: 76.4 kg (168 lb 6.9 oz)  Body mass index is 29.84 kg/m².  Body surface area is 1.84 meters squared.    ECOG SCORE         [unfilled]    Intake/Output - Last 3 Shifts       06/14 0700 - 06/15 0659 06/15 0700 - 06/16 0659 06/16 0700 - 06/17 0659    P.O. 120 540     I.V. (mL/kg) 2785 (37.3) 2200 (28.8) 443.3 (5.8)    Blood 329 594     IV Piggyback 750 850     Total Intake(mL/kg) 3984 (53.4) 4184 (54.8) 443.3 (5.8)    Urine (mL/kg/hr) 2200 (1.2) 2100 (1.1)     Total Output 2200 2100      Net +1784 +2084 +443.3           Urine Occurrence 4 x      Stool Occurrence 1 x            Physical Exam   Constitutional: She is oriented to person, place, and time. She appears well-developed and well-nourished. No distress.   HENT:   Head: Normocephalic and atraumatic.   Eyes: Pupils are equal, round, and reactive to light.   Neck: Normal range of motion.   Cardiovascular: Normal rate and regular rhythm.    No murmur heard.  Pulmonary/Chest: Effort normal and breath sounds normal. No respiratory distress.   Abdominal: Soft. Bowel sounds are normal. There is no tenderness.   Genitourinary:   Genitourinary Comments: rash resolved    Musculoskeletal: Normal range of motion. She exhibits no edema.   Neurological: She is alert and oriented to person, place, and time.   Skin: Skin is warm and dry.   Vitals  reviewed.      Significant Labs:   All pertinent labs from the last 24 hours have been reviewed.    Diagnostic Results:  I have reviewed and interpreted all pertinent imaging results/findings within the past 24 hours.

## 2018-06-16 NOTE — ASSESSMENT & PLAN NOTE
- Admitted from [facility] on 5/25/18 early AM with WBC around 100s, for 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 Ordered routine labs in addition to FLT3, NGS, and AML FISH.  - lo res HLA typing on 5/26/18 and hi res on 5/27/18 done in anticipation of possible need for future transplant   - Pt with two daughters, two full sisters (in their mid 60s, one with brain aneurysm hx, other one without any medical issues), and one full brother (59 y/o healthy).  - ECHO ordered 5/25/18, EF 65%  - PICC line placed 5/25/18   - initially on hydrea, discontinued on 5/28.   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  - started on induction therapy on 5/29/18.  - Day 19 of 7+3.    - allopurinol stopped 6/11  - prophylactic antimicrobials: Acyclovir, Voriconazole  - will need CNS Prophylaxis given her elevated WBC (high risk) on admission after count recovery.  - NPM1 +, CEBPA (-), FLT3 (+).  On Midostaurin 50 mg PO BID until Day 14 (held starting 6/8 to 6/11). Will continue til day 21 (6/18) or until re-induction  - day 14 bone marrow biopsy completed 6/11 showing persistent disease with 15% CD 34 positive blasts  - Day 2 of FLAG-JENNIFER  - repeat 2D echo on 6/15 with preserved EF of 60%

## 2018-06-16 NOTE — PROGRESS NOTES
[facility]  Hematology  Bone Marrow Transplant  Progress Note    Patient Name: Noreen Mac  Admission Date: 5/24/2018  Hospital Length of Stay: 23 days  Code Status: Full Code    Subjective:     Interval History:   Day 19 of 7+3. Day 2 of Flag-JENNIFER. Remains afebrile. Calcium remains low and have increased PO supplementation. Remains on vanc and aztreonam. Somewhat loopy feeling after receiving benadryl.     Objective:     Vital Signs (Most Recent):  Temp: 98.2 °F (36.8 °C) (06/16/18 0821)  Pulse: 71 (06/16/18 0821)  Resp: 17 (06/16/18 0821)  BP: (!) 92/53 (06/16/18 0821)  SpO2: (!) 94 % (06/16/18 0821) Vital Signs (24h Range):  Temp:  [97.7 °F (36.5 °C)-99 °F (37.2 °C)] 98.2 °F (36.8 °C)  Pulse:  [71-92] 71  Resp:  [17-18] 17  SpO2:  [93 %-97 %] 94 %  BP: ()/(51-58) 92/53     Weight: 76.4 kg (168 lb 6.9 oz)  Body mass index is 29.84 kg/m².  Body surface area is 1.84 meters squared.    ECOG SCORE         [unfilled]    Intake/Output - Last 3 Shifts       06/14 0700 - 06/15 0659 06/15 0700 - 06/16 0659 06/16 0700 - 06/17 0659    P.O. 120 540     I.V. (mL/kg) 2785 (37.3) 2200 (28.8) 443.3 (5.8)    Blood 329 594     IV Piggyback 750 850     Total Intake(mL/kg) 3984 (53.4) 4184 (54.8) 443.3 (5.8)    Urine (mL/kg/hr) 2200 (1.2) 2100 (1.1)     Total Output 2200 2100      Net +1784 +2084 +443.3           Urine Occurrence 4 x      Stool Occurrence 1 x            Physical Exam   Constitutional: She is oriented to person, place, and time. She appears well-developed and well-nourished. No distress.   HENT:   Head: Normocephalic and atraumatic.   Eyes: Pupils are equal, round, and reactive to light.   Neck: Normal range of motion.   Cardiovascular: Normal rate and regular rhythm.    No murmur heard.  Pulmonary/Chest: Effort normal and breath sounds normal. No respiratory distress.   Abdominal: Soft. Bowel sounds are normal. There is no tenderness.   Genitourinary:   Genitourinary Comments: rash  resolved    Musculoskeletal: Normal range of motion. She exhibits no edema.   Neurological: She is alert and oriented to person, place, and time.   Skin: Skin is warm and dry.   Vitals reviewed.      Significant Labs:   All pertinent labs from the last 24 hours have been reviewed.    Diagnostic Results:  I have reviewed and interpreted all pertinent imaging results/findings within the past 24 hours.    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 Ordered routine labs in addition to FLT3, NGS, and AML FISH.  - lo res HLA typing on 5/26/18 and hi res on 5/27/18 done in anticipation of possible need for future transplant   - Pt with two daughters, two full sisters (in their mid 60s, one with brain aneurysm hx, other one without any medical issues), and one full brother (59 y/o healthy).  - ECHO ordered 5/25/18, EF 65%  - PICC line placed 5/25/18   - initially on hydrea, discontinued on 5/28.   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  - started on induction therapy on 5/29/18.  - Day 19 of 7+3.    - allopurinol stopped 6/11  - prophylactic antimicrobials: Acyclovir, Voriconazole  - will need CNS Prophylaxis given her elevated WBC (high risk) on admission after count recovery.  - NPM1 +, CEBPA (-), FLT3 (+).  On Midostaurin 50 mg PO BID until Day 14 (held starting 6/8 to 6/11). Will continue til day 21 (6/18) or until re-induction  - day 14 bone marrow biopsy completed 6/11 showing persistent disease with 15% CD 34 positive blasts  - Day 2 of FLAG-JENNIFER  - repeat 2D echo on 6/15 with preserved EF of 60%          Hypocalcemia    - corrected calcium remains low despite PO replacement therapy  - pending vitamin D level  - increase calcium carbonate to 1000 mg tid        Abnormal  liver enzymes    - baseline normal  - bilirubin continues to rise up to 2.5 today  - LFTs wnl except for ALP of 1126  - holding midostaurin while patient gets induction chemo        Hypophosphatemia    - prn replacements        Hypomagnesemia    - prn replacements + scheduled replacements        Pancytopenia    - secondary to chemotherapy.   - tranfuse if Hb < 7 g/dL and Plt < 10,000/mm3 or active bleed.           MCTD (mixed connective tissue disease)    - MCTD-NOS  - previously on MTX and Prednisone.   - off therapy now and otherwise doing well.   - can follow up outpatient with her Rheumatologist upon discharge.         Bilateral headaches    - complained of bilateral temporal headaches earlier in hospitalization course  - similar to ones on presentation at OSH  - CT Head done at OSH was unremarkable.   - will consider re-imaging if deemed necessary.   - currently resolved          Staphylococcus epidermidis bacteremia    - blood cultures from 6/11 at 0430 with gram + cocci, staph epidermis sensitive for Vancomycin, CXR and UA unremarkable.   - Continue vancomycin with projected end date of 6/27  - Pending vancomycin trough  - surveillance blood cultures repeated 6/13 NGTD  - de-escalate aztreonam back to prophylactic cipro 6/16        Pulmonary nodule    - CXR suggestive of 7 mm slightly irregular shaped radiodensity projected over the left upper lung zone which could represent a calcified granuloma but remains indeterminate.  - CT Chest on 5/28: Multiple scattered noncalcified pulmonary granulomas, the largest measuring 8 mm in the lateral segment of the right middle lobe.   - repeat non-contrast chest CT at 6-12 months         Insomnia    - PRN nightly Xanax.         HTN (hypertension)    - history of SVT and reports intermittent palpitations at times.  - EKG on 5/28 - NSR.  - increased metoprolol XL to 25 mg QD,she feels better with this dose.  - hypotensive x 48 hours--improved today  - metoprolol tartrate  changed on 6/13 to 25 mg bid (hold for SBP <100)        GERD (gastroesophageal reflux disease)    - takes Omeprazole at home, now on pepcid here  - Protonix added 6/13  - reflux improved         Osteoporosis    - takes prolia once every 6 months, last given mid April 2018   - no acute issue         Depression    - no acute issue   - continue home Escitalopram             VTE Risk Mitigation         Ordered     heparin, porcine (PF) 100 unit/mL injection flush 300 Units  As needed (PRN)      06/15/18 1657     Place MAYLIN hose  Until discontinued      05/25/18 0032     IP VTE HIGH RISK PATIENT  Once      05/25/18 0032          Disposition: Pending completion of treatment    Emily Jeffrey MD  Bone Marrow Transplant  [facility]

## 2018-06-16 NOTE — PLAN OF CARE
Problem: Patient Care Overview  Goal: Plan of Care Review  Outcome: Ongoing (interventions implemented as appropriate)  POC reviewed with patient; understanding verbalized. Magnesium and KPhos replacements administered Azetronam and Vanc administered as ordered. Pt voids concentrated urine in the hat; no noted BM this shift. Regular diet with decreased appetite. NS at 100. PRN Zofran administered X1. Pt. with nonskid footwear on, bed in lowest position, and locked with bed rails up x2.  Pt. instructed to call prior to getting OOB.  Pt. has call light and personal items within reach. VSS and afebrile this shift. All questions and concerns address at this time. Will continue to monitor.

## 2018-06-16 NOTE — PLAN OF CARE
Problem: Patient Care Overview  Goal: Plan of Care Review  Outcome: Ongoing (interventions implemented as appropriate)  Day 2 of FLAG JENNIFER. Patient is progressing and involved with plan of care. Frequent rounds made to assess pain and safety. Pt communicating needs throughout shift. Up ad gunnar. Tolerating diet, voiding without difficulty. Pain controlled with PRN morphine X1. Aztreonam and vanc continued as ordered. IVF continued as ordered. Pt requested no interruptions between 2200 and 0400.   All vitals stable; no acute events this shift. Pt. Remaining free from falls or injury throughout shift; bed in lowest position; side rails up X2; call light within reach; pt instructed to call for assistance as needed - verbalized understanding. Q2h rounding on patient. Will continue to monitor.

## 2018-06-17 PROBLEM — E87.70 FLUID OVERLOAD: Status: ACTIVE | Noted: 2018-06-17

## 2018-06-17 LAB
ALBUMIN SERPL BCP-MCNC: 1.8 G/DL
ALP SERPL-CCNC: 877 U/L
ALT SERPL W/O P-5'-P-CCNC: 17 U/L
ANION GAP SERPL CALC-SCNC: 6 MMOL/L
ANISOCYTOSIS BLD QL SMEAR: SLIGHT
AST SERPL-CCNC: 15 U/L
BASOPHILS NFR BLD: 0 %
BILIRUB SERPL-MCNC: 0.9 MG/DL
BUN SERPL-MCNC: 10 MG/DL
CALCIUM SERPL-MCNC: 6.5 MG/DL
CHLORIDE SERPL-SCNC: 112 MMOL/L
CO2 SERPL-SCNC: 21 MMOL/L
CREAT SERPL-MCNC: 0.6 MG/DL
DIFFERENTIAL METHOD: ABNORMAL
EOSINOPHIL NFR BLD: 0 %
ERYTHROCYTE [DISTWIDTH] IN BLOOD BY AUTOMATED COUNT: 16.5 %
EST. GFR  (AFRICAN AMERICAN): >60 ML/MIN/1.73 M^2
EST. GFR  (NON AFRICAN AMERICAN): >60 ML/MIN/1.73 M^2
GLUCOSE SERPL-MCNC: 125 MG/DL
HCT VFR BLD AUTO: 21.4 %
HGB BLD-MCNC: 7.3 G/DL
HYPOCHROMIA BLD QL SMEAR: ABNORMAL
IMM GRANULOCYTES # BLD AUTO: ABNORMAL K/UL
IMM GRANULOCYTES NFR BLD AUTO: ABNORMAL %
LYMPHOCYTES NFR BLD: 17 %
MAGNESIUM SERPL-MCNC: 1.2 MG/DL
MAGNESIUM SERPL-MCNC: 1.7 MG/DL
MCH RBC QN AUTO: 30.4 PG
MCHC RBC AUTO-ENTMCNC: 34.1 G/DL
MCV RBC AUTO: 89 FL
MONOCYTES NFR BLD: 3 %
NEUTROPHILS NFR BLD: 72 %
NEUTS BAND NFR BLD MANUAL: 8 %
NRBC BLD-RTO: 0 /100 WBC
OVALOCYTES BLD QL SMEAR: ABNORMAL
PHOSPHATE SERPL-MCNC: 1.5 MG/DL
PHOSPHATE SERPL-MCNC: 1.8 MG/DL
PLATELET # BLD AUTO: 20 K/UL
PLATELET BLD QL SMEAR: ABNORMAL
PMV BLD AUTO: 11.2 FL
POIKILOCYTOSIS BLD QL SMEAR: SLIGHT
POLYCHROMASIA BLD QL SMEAR: ABNORMAL
POTASSIUM SERPL-SCNC: 4.8 MMOL/L
PROT SERPL-MCNC: 4.7 G/DL
RBC # BLD AUTO: 2.4 M/UL
SODIUM SERPL-SCNC: 139 MMOL/L
WBC # BLD AUTO: 0.74 K/UL

## 2018-06-17 PROCEDURE — 25000003 PHARM REV CODE 250: Performed by: STUDENT IN AN ORGANIZED HEALTH CARE EDUCATION/TRAINING PROGRAM

## 2018-06-17 PROCEDURE — 25000003 PHARM REV CODE 250: Performed by: INTERNAL MEDICINE

## 2018-06-17 PROCEDURE — 99233 SBSQ HOSP IP/OBS HIGH 50: CPT | Mod: ,,, | Performed by: INTERNAL MEDICINE

## 2018-06-17 PROCEDURE — 25000003 PHARM REV CODE 250: Performed by: NURSE PRACTITIONER

## 2018-06-17 PROCEDURE — 83735 ASSAY OF MAGNESIUM: CPT

## 2018-06-17 PROCEDURE — 84100 ASSAY OF PHOSPHORUS: CPT | Mod: 91

## 2018-06-17 PROCEDURE — 63600175 PHARM REV CODE 636 W HCPCS: Performed by: INTERNAL MEDICINE

## 2018-06-17 PROCEDURE — 20600001 HC STEP DOWN PRIVATE ROOM

## 2018-06-17 PROCEDURE — 80053 COMPREHEN METABOLIC PANEL: CPT

## 2018-06-17 PROCEDURE — 83735 ASSAY OF MAGNESIUM: CPT | Mod: 91

## 2018-06-17 PROCEDURE — 84100 ASSAY OF PHOSPHORUS: CPT

## 2018-06-17 RX ORDER — MAGNESIUM SULFATE HEPTAHYDRATE 40 MG/ML
2 INJECTION, SOLUTION INTRAVENOUS
Status: DISCONTINUED | OUTPATIENT
Start: 2018-06-17 | End: 2018-07-08

## 2018-06-17 RX ORDER — VANCOMYCIN HYDROCHLORIDE
1500
Status: DISCONTINUED | OUTPATIENT
Start: 2018-06-17 | End: 2018-06-18

## 2018-06-17 RX ORDER — CIPROFLOXACIN 500 MG/1
500 TABLET ORAL EVERY 12 HOURS
Status: DISCONTINUED | OUTPATIENT
Start: 2018-06-17 | End: 2018-06-20

## 2018-06-17 RX ORDER — LANOLIN ALCOHOL/MO/W.PET/CERES
800 CREAM (GRAM) TOPICAL 2 TIMES DAILY
Status: DISCONTINUED | OUTPATIENT
Start: 2018-06-17 | End: 2018-06-29

## 2018-06-17 RX ORDER — FUROSEMIDE 10 MG/ML
20 INJECTION INTRAMUSCULAR; INTRAVENOUS ONCE
Status: COMPLETED | OUTPATIENT
Start: 2018-06-17 | End: 2018-06-17

## 2018-06-17 RX ADMIN — Medication 800 MG: at 08:06

## 2018-06-17 RX ADMIN — MAGNESIUM SULFATE IN WATER 2 G: 40 INJECTION, SOLUTION INTRAVENOUS at 08:06

## 2018-06-17 RX ADMIN — METOPROLOL TARTRATE 25 MG: 25 TABLET, FILM COATED ORAL at 09:06

## 2018-06-17 RX ADMIN — Medication 10 ML: at 08:06

## 2018-06-17 RX ADMIN — Medication 10 ML: at 05:06

## 2018-06-17 RX ADMIN — VORICONAZOLE 200 MG: 200 TABLET, FILM COATED ORAL at 08:06

## 2018-06-17 RX ADMIN — DEXAMETHASONE 1 DROP: 1 SUSPENSION OPHTHALMIC at 05:06

## 2018-06-17 RX ADMIN — VITAMIN D, TAB 1000IU (100/BT) 5000 UNITS: 25 TAB at 08:06

## 2018-06-17 RX ADMIN — METOPROLOL TARTRATE 25 MG: 25 TABLET, FILM COATED ORAL at 08:06

## 2018-06-17 RX ADMIN — PANTOPRAZOLE SODIUM 40 MG: 40 TABLET, DELAYED RELEASE ORAL at 08:06

## 2018-06-17 RX ADMIN — VANCOMYCIN HYDROCHLORIDE 1500 MG: 10 INJECTION, POWDER, LYOPHILIZED, FOR SOLUTION INTRAVENOUS at 09:06

## 2018-06-17 RX ADMIN — Medication 800 MG: at 06:06

## 2018-06-17 RX ADMIN — CIPROFLOXACIN HYDROCHLORIDE 500 MG: 500 TABLET, FILM COATED ORAL at 08:06

## 2018-06-17 RX ADMIN — SODIUM CHLORIDE 18 MG: 9 INJECTION, SOLUTION INTRAVENOUS at 08:06

## 2018-06-17 RX ADMIN — CALCIUM CARBONATE (ANTACID) CHEW TAB 500 MG 1000 MG: 500 CHEW TAB at 09:06

## 2018-06-17 RX ADMIN — CALCIUM CARBONATE (ANTACID) CHEW TAB 500 MG 1000 MG: 500 CHEW TAB at 02:06

## 2018-06-17 RX ADMIN — ONDANSETRON 16 MG: 4 TABLET, FILM COATED ORAL at 07:06

## 2018-06-17 RX ADMIN — SODIUM CHLORIDE: 0.9 INJECTION, SOLUTION INTRAVENOUS at 07:06

## 2018-06-17 RX ADMIN — CYTARABINE 3640 MG: 100 INJECTION, SOLUTION INTRATHECAL; INTRAVENOUS; SUBCUTANEOUS at 12:06

## 2018-06-17 RX ADMIN — DEXAMETHASONE 1 DROP: 1 SUSPENSION OPHTHALMIC at 12:06

## 2018-06-17 RX ADMIN — CALCIUM CARBONATE (ANTACID) CHEW TAB 500 MG 1000 MG: 500 CHEW TAB at 08:06

## 2018-06-17 RX ADMIN — Medication 10 ML: at 01:06

## 2018-06-17 RX ADMIN — ACYCLOVIR 400 MG: 200 CAPSULE ORAL at 08:06

## 2018-06-17 RX ADMIN — ESCITALOPRAM 10 MG: 5 TABLET, FILM COATED ORAL at 08:06

## 2018-06-17 RX ADMIN — FILGRASTIM 480 MCG: 480 INJECTION, SOLUTION INTRAVENOUS; SUBCUTANEOUS at 07:06

## 2018-06-17 RX ADMIN — FAMOTIDINE 20 MG: 20 TABLET ORAL at 08:06

## 2018-06-17 RX ADMIN — DEXAMETHASONE 1 DROP: 1 SUSPENSION OPHTHALMIC at 11:06

## 2018-06-17 RX ADMIN — DEXAMETHASONE 1 DROP: 1 SUSPENSION OPHTHALMIC at 06:06

## 2018-06-17 RX ADMIN — CALCIUM CHLORIDE 1 G: 100 INJECTION INTRAVENOUS; INTRAVENTRICULAR at 11:06

## 2018-06-17 RX ADMIN — ALPRAZOLAM 0.25 MG: 0.25 TABLET ORAL at 04:06

## 2018-06-17 RX ADMIN — SODIUM PHOSPHATE, MONOBASIC, MONOHYDRATE 15 MMOL: 276; 142 INJECTION, SOLUTION INTRAVENOUS at 06:06

## 2018-06-17 RX ADMIN — RAMELTEON 8 MG: 8 TABLET, FILM COATED ORAL at 10:06

## 2018-06-17 RX ADMIN — FUROSEMIDE 20 MG: 10 INJECTION, SOLUTION INTRAVENOUS at 09:06

## 2018-06-17 RX ADMIN — FLUDARABINE PHOSPHATE 55 MG: 25 INJECTION, SOLUTION INTRAVENOUS at 08:06

## 2018-06-17 RX ADMIN — MAGNESIUM SULFATE IN WATER 2 G: 40 INJECTION, SOLUTION INTRAVENOUS at 10:06

## 2018-06-17 NOTE — PLAN OF CARE
Problem: Patient Care Overview  Goal: Plan of Care Review  Outcome: Ongoing (interventions implemented as appropriate)  POC reviewed with patient; understanding verbalized. Magnesium and Calcium replacements administered. Lasix administered for diuresis. Dexamethasone drops and Vanc administered as ordered. Pt voids concentrated urine in the hat; one noted BM this shift. Regular diet with decreased appetite. NS at 100. PRN Xanax administered X1. Pt. with nonskid footwear on, bed in lowest position, and locked with bed rails up x2. Pt. has call light and personal items within reach. VSS and afebrile this shift. All questions and concerns address at this time. Will continue to monitor.

## 2018-06-17 NOTE — ASSESSMENT & PLAN NOTE
- history of SVT and reports intermittent palpitations at times.  - EKG on 5/28 - NSR.  - increased metoprolol XL to 25 mg QD,she feels better with this dose.  - metoprolol tartrate changed on 6/13 to 25 mg bid (hold for SBP <100)

## 2018-06-17 NOTE — ASSESSMENT & PLAN NOTE
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 Ordered routine labs in addition to FLT3, NGS, and AML FISH.  - lo res HLA typing on 5/26/18 and hi res on 5/27/18 done in anticipation of possible need for future transplant   - Pt with two daughters, two full sisters (in their mid 60s, one with brain aneurysm hx, other one without any medical issues), and one full brother (61 y/o healthy).  - ECHO ordered 5/25/18, EF 65%  - PICC line placed 5/25/18   - initially on hydrea, discontinued on 5/28.   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  - started on induction therapy on 5/29/18.  - Day 20 of 7+3.    - allopurinol stopped 6/11  - prophylactic antimicrobials: Acyclovir, Voriconazole  - will need CNS Prophylaxis given her elevated WBC (high risk) on admission after count recovery.  - NPM1 +, CEBPA (-), FLT3 (+).  On Midostaurin 50 mg PO BID until Day 14 (held starting 6/8 to 6/11). Will continue til day 21 (6/18) or until re-induction  - day 14 bone marrow biopsy completed 6/11 showing persistent disease with 15% CD 34 positive blasts  - Day 3 of FLAG-JENNIFER  - repeat 2D echo on 6/15 with preserved EF of 60%

## 2018-06-17 NOTE — TREATMENT PLAN
cytarabine (PF) (CYTOSAR) 2,000 mg/m2 = 3,640 mg in sodium chloride 0.9% 250 mL chemo infusion  started through TRAM PICC, noted for having positive blood return to infuse over 30 minutes. Dosage and BSA checked by two chemotherapy certified nurses, Flaco Martinez RN and Dasia Ashby RN. Premedications given prior to starting infusion. Consent and order in chart/EPIC. Chemotherapy education completed at this time. Chemotherapeutic precautions in place throughout therapy. Will continue to monitor.

## 2018-06-17 NOTE — PLAN OF CARE
Problem: Patient Care Overview  Goal: Plan of Care Review  Outcome: Ongoing (interventions implemented as appropriate)  Day 2 of FLAG JENNIFER complete. Tolerated well. . Patient is progressing and involved with plan of care. Frequent rounds made to assess pain and safety. Pt communicating needs throughout shift. Up ad gunnar. Tolerating diet, voiding without difficulty. No c/o pain this shift.. Aztreonam and Vanc continued as ordered. IVF continued as ordered.   All vitals stable; no acute events this shift. Pt. Remaining free from falls or injury throughout shift; bed in lowest position; side rails up X2; call light within reach; pt instructed to call for assistance as needed - verbalized understanding. Q2h rounding on patient. Will continue to monitor.

## 2018-06-17 NOTE — ASSESSMENT & PLAN NOTE
- baseline normal  - bilirubin improving  - LFTs wnl except for ALP though improving   - holding midostaurin while patient gets induction chemo

## 2018-06-17 NOTE — SUBJECTIVE & OBJECTIVE
Subjective:     Interval History:   Day 20 of 7+3. Day 3 of FLAG-JENNIFER. Multiple electrolyte abnormalities- will start checking BID mag and phos. New bilateral leg and feet swelling- will give a dose of lasix. Mental status improved off benadryl. Corrected calcium low so will give additional IV calcium plus her 3 g of PO calcium.     She denies any fevers, chills, cough, SOB, CP, N/V, diarrhea, constipation, or dysuria.     Objective:     Vital Signs (Most Recent):  Temp: 99.3 °F (37.4 °C) (06/17/18 0721)  Pulse: 69 (06/17/18 0721)  Resp: 18 (06/17/18 0721)  BP: 125/61 (06/17/18 0721)  SpO2: (!) 93 % (06/17/18 0721) Vital Signs (24h Range):  Temp:  [97.5 °F (36.4 °C)-99.3 °F (37.4 °C)] 99.3 °F (37.4 °C)  Pulse:  [69-83] 69  Resp:  [18-19] 18  SpO2:  [93 %-98 %] 93 %  BP: ()/(53-61) 125/61     Weight: 79.1 kg (174 lb 6.1 oz)  Body mass index is 30.89 kg/m².  Body surface area is 1.88 meters squared.    ECOG SCORE         [unfilled]    Intake/Output - Last 3 Shifts       06/15 0700 - 06/16 0659 06/16 0700 - 06/17 0659 06/17 0700 - 06/18 0659    P.O. 540 550     I.V. (mL/kg) 2200 (28.8) 2553 (32.3) 226.7 (2.9)    Blood 594      IV Piggyback 850 1100 250    Total Intake(mL/kg) 4184 (54.8) 4203 (53.1) 476.7 (6)    Urine (mL/kg/hr) 2100 (1.1) 1350 (0.7) 400 (1.5)    Total Output 2100 1350 400    Net +2084 +2853 +76.7           Urine Occurrence  3 x           Physical Exam   Constitutional: She is oriented to person, place, and time. She appears well-developed and well-nourished. No distress.   HENT:   Head: Normocephalic and atraumatic.   Eyes: Pupils are equal, round, and reactive to light.   Neck: Normal range of motion.   Cardiovascular: Normal rate and regular rhythm.    No murmur heard.  Pulmonary/Chest: Effort normal and breath sounds normal. No respiratory distress.   Abdominal: Soft. Bowel sounds are normal. There is no tenderness.   Genitourinary:   Genitourinary Comments: rash resolved    Musculoskeletal:  Normal range of motion. She exhibits edema (bilateral 2+ pitting edema up to knee).   Neurological: She is alert and oriented to person, place, and time.   Skin: Skin is warm and dry.   Vitals reviewed.      Significant Labs:   All pertinent labs from the last 24 hours have been reviewed.    Diagnostic Results:  I have reviewed and interpreted all pertinent imaging results/findings within the past 24 hours.

## 2018-06-17 NOTE — ASSESSMENT & PLAN NOTE
- blood cultures from 6/11 at 0430 with gram + cocci, staph epidermis sensitive for Vancomycin, CXR and UA unremarkable.   - Continue vancomycin with projected end date of 6/27  - vanc trough 10.4 on 6/160 increased vanc dose to 1500 mg BID  - surveillance blood cultures repeated 6/13 NGTD  - de-escalate aztreonam back to prophylactic cipro 6/16

## 2018-06-17 NOTE — PROGRESS NOTES
[facility]  Hematology  Bone Marrow Transplant  Progress Note    Patient Name: Noreen Mac  Admission Date: 5/24/2018  Hospital Length of Stay: 24 days  Code Status: Full Code    Subjective:     Interval History:   Day 20 of 7+3. Day 3 of FLAG-JENNIFER. Multiple electrolyte abnormalities- will start checking BID mag and phos. New bilateral leg and feet swelling- will give a dose of lasix. Mental status improved off benadryl. Corrected calcium low so will give additional IV calcium plus her 3 g of PO calcium.     She denies any fevers, chills, cough, SOB, CP, N/V, diarrhea, constipation, or dysuria.     Objective:     Vital Signs (Most Recent):  Temp: 99.3 °F (37.4 °C) (06/17/18 0721)  Pulse: 69 (06/17/18 0721)  Resp: 18 (06/17/18 0721)  BP: 125/61 (06/17/18 0721)  SpO2: (!) 93 % (06/17/18 0721) Vital Signs (24h Range):  Temp:  [97.5 °F (36.4 °C)-99.3 °F (37.4 °C)] 99.3 °F (37.4 °C)  Pulse:  [69-83] 69  Resp:  [18-19] 18  SpO2:  [93 %-98 %] 93 %  BP: ()/(53-61) 125/61     Weight: 79.1 kg (174 lb 6.1 oz)  Body mass index is 30.89 kg/m².  Body surface area is 1.88 meters squared.    ECOG SCORE         [unfilled]    Intake/Output - Last 3 Shifts       06/15 0700 - 06/16 0659 06/16 0700 - 06/17 0659 06/17 0700 - 06/18 0659    P.O. 540 550     I.V. (mL/kg) 2200 (28.8) 2553 (32.3) 226.7 (2.9)    Blood 594      IV Piggyback 850 1100 250    Total Intake(mL/kg) 4184 (54.8) 4203 (53.1) 476.7 (6)    Urine (mL/kg/hr) 2100 (1.1) 1350 (0.7) 400 (1.5)    Total Output 2100 1350 400    Net +2084 +2853 +76.7           Urine Occurrence  3 x           Physical Exam   Constitutional: She is oriented to person, place, and time. She appears well-developed and well-nourished. No distress.   HENT:   Head: Normocephalic and atraumatic.   Eyes: Pupils are equal, round, and reactive to light.   Neck: Normal range of motion.   Cardiovascular: Normal rate and regular rhythm.    No murmur heard.  Pulmonary/Chest: Effort  normal and breath sounds normal. No respiratory distress.   Abdominal: Soft. Bowel sounds are normal. There is no tenderness.   Genitourinary:   Genitourinary Comments: rash resolved    Musculoskeletal: Normal range of motion. She exhibits edema (bilateral 2+ pitting edema up to knee).   Neurological: She is alert and oriented to person, place, and time.   Skin: Skin is warm and dry.   Vitals reviewed.      Significant Labs:   All pertinent labs from the last 24 hours have been reviewed.    Diagnostic Results:  I have reviewed and interpreted all pertinent imaging results/findings within the past 24 hours.    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 Ordered routine labs in addition to FLT3, NGS, and AML FISH.  - lo res HLA typing on 5/26/18 and hi res on 5/27/18 done in anticipation of possible need for future transplant   - Pt with two daughters, two full sisters (in their mid 60s, one with brain aneurysm hx, other one without any medical issues), and one full brother (59 y/o healthy).  - ECHO ordered 5/25/18, EF 65%  - PICC line placed 5/25/18   - initially on hydrea, discontinued on 5/28.   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  - started on induction therapy on 5/29/18.  - Day 20 of 7+3.    - allopurinol stopped 6/11  - prophylactic antimicrobials: Acyclovir, Voriconazole  - will need CNS Prophylaxis given her elevated WBC (high risk) on admission after count recovery.  - NPM1 +, CEBPA (-), FLT3 (+).  On Midostaurin 50 mg PO BID until Day 14 (held starting 6/8 to 6/11). Will continue til day 21 (6/18) or until re-induction  - day 14 bone marrow biopsy completed 6/11 showing persistent disease with 15% CD 34 positive blasts  - Day 3 of FLAG-JENNIFER  - repeat 2D  echo on 6/15 with preserved EF of 60%          Fluid overload    - up 4 kg from admission  - lasix 20 mg IV today         Hypocalcemia    - corrected calcium remains low despite PO replacement therapy  - likely needs aggressive magnesium replacement before calcium will correct  - vitamin d level low so will start with 5000 u of vitamin D daily and will need repeat vitamin D level in 3 months  - IV calcium chloride today  - continue calcium carbonate to 1000 mg tid        Abnormal liver enzymes    - baseline normal  - bilirubin improving  - LFTs wnl except for ALP though improving   - holding midostaurin while patient gets induction chemo        Hypophosphatemia    - will check BID  - prn replacements        Hypomagnesemia    - BID mag check  - prn replacements + scheduled replacements (increased to 800 mg daily)        Pancytopenia    - secondary to chemotherapy.   - tranfuse if Hb < 7 g/dL and Plt < 10,000/mm3 or active bleed.           MCTD (mixed connective tissue disease)    - MCTD-NOS  - previously on MTX and Prednisone.   - off therapy now and otherwise doing well.   - can follow up outpatient with her Rheumatologist upon discharge.         Bilateral headaches    - complained of bilateral temporal headaches earlier in hospitalization course  - similar to ones on presentation at OSH  - CT Head done at OSH was unremarkable.   - will consider re-imaging if deemed necessary.   - currently resolved          Staphylococcus epidermidis bacteremia    - blood cultures from 6/11 at 0430 with gram + cocci, staph epidermis sensitive for Vancomycin, CXR and UA unremarkable.   - Continue vancomycin with projected end date of 6/27  - vanc trough 10.4 on 6/160 increased vanc dose to 1500 mg BID  - surveillance blood cultures repeated 6/13 NGTD  - de-escalate aztreonam back to prophylactic cipro 6/16        Pulmonary nodule    - CXR suggestive of 7 mm slightly irregular shaped radiodensity projected over the left upper lung  zone which could represent a calcified granuloma but remains indeterminate.  - CT Chest on 5/28: Multiple scattered noncalcified pulmonary granulomas, the largest measuring 8 mm in the lateral segment of the right middle lobe.   - repeat non-contrast chest CT at 6-12 months         Insomnia    - PRN nightly Xanax.         HTN (hypertension)    - history of SVT and reports intermittent palpitations at times.  - EKG on 5/28 - NSR.  - increased metoprolol XL to 25 mg QD,she feels better with this dose.  - metoprolol tartrate changed on 6/13 to 25 mg bid (hold for SBP <100)        GERD (gastroesophageal reflux disease)    - takes Omeprazole at home, now on pepcid here  - Protonix added 6/13  - reflux improved         Osteoporosis    - takes prolia once every 6 months, last given mid April 2018   - no acute issue         Depression    - no acute issue   - continue home Escitalopram             VTE Risk Mitigation         Ordered     heparin, porcine (PF) 100 unit/mL injection flush 300 Units  As needed (PRN)      06/15/18 1657     Place MAYLIN hose  Until discontinued      05/25/18 0032     IP VTE HIGH RISK PATIENT  Once      05/25/18 0032          Disposition: Pending completion of consolidation chemo    Emily Jeffrey MD  Bone Marrow Transplant  [facility]

## 2018-06-17 NOTE — ASSESSMENT & PLAN NOTE
- corrected calcium remains low despite PO replacement therapy  - likely needs aggressive magnesium replacement before calcium will correct  - vitamin d level low so will start with 5000 u of vitamin D daily and will need repeat vitamin D level in 3 months  - IV calcium chloride today  - continue calcium carbonate to 1000 mg tid

## 2018-06-17 NOTE — TREATMENT PLAN
IDArubicin (IDAMYCIN) 18 mg in sodium chloride 0.9% 50 mL chemo infusion started through TRAM PICC. noted for having positive blood return to infuse over 15 minutes. Dosage and BSA checked by two chemotherapy certified nurses, Dasia Potts, MABEL and Dasia Ashby RN. Premedications given prior to starting infusion. Consent and order in chart/EPIC. Chemotherapy education completed at this time. Chemotherapeutic precautions in place throughout therapy. Will continue to monitor.

## 2018-06-17 NOTE — TREATMENT PLAN
fludarabine (FLUDARA) 55 mg in sodium chloride 0.9% 100 mL chemo infusion started through TRAM PICC, noted for having positive blood return to infuse over 30 minutes. Dosage and BSA checked by two chemotherapy certified nurses, Dasia Potts, MABEL and Dasia Ahsby RN. Premedications given prior to starting infusion. Consent and order in chart/EPIC. Chemotherapy education completed at this time. Chemotherapeutic precautions in place throughout therapy. Will continue to monitor.

## 2018-06-18 PROBLEM — R51.9 BILATERAL HEADACHES: Status: RESOLVED | Noted: 2018-05-29 | Resolved: 2018-06-18

## 2018-06-18 LAB
ABO + RH BLD: NORMAL
ALBUMIN SERPL BCP-MCNC: 1.7 G/DL
ALP SERPL-CCNC: 779 U/L
ALT SERPL W/O P-5'-P-CCNC: 24 U/L
ANION GAP SERPL CALC-SCNC: 7 MMOL/L
ANISOCYTOSIS BLD QL SMEAR: SLIGHT
AST SERPL-CCNC: 45 U/L
BACTERIA BLD CULT: NORMAL
BASOPHILS NFR BLD: 0 %
BILIRUB SERPL-MCNC: 0.8 MG/DL
BLD GP AB SCN CELLS X3 SERPL QL: NORMAL
BUN SERPL-MCNC: 16 MG/DL
CA-I BLDV-SCNC: 1.01 MMOL/L
CALCIUM SERPL-MCNC: 6.6 MG/DL
CHLORIDE SERPL-SCNC: 112 MMOL/L
CO2 SERPL-SCNC: 21 MMOL/L
CREAT SERPL-MCNC: 0.5 MG/DL
DIFFERENTIAL METHOD: ABNORMAL
EOSINOPHIL NFR BLD: 0 %
ERYTHROCYTE [DISTWIDTH] IN BLOOD BY AUTOMATED COUNT: 17 %
EST. GFR  (AFRICAN AMERICAN): >60 ML/MIN/1.73 M^2
EST. GFR  (NON AFRICAN AMERICAN): >60 ML/MIN/1.73 M^2
GIANT PLATELETS BLD QL SMEAR: PRESENT
GLUCOSE SERPL-MCNC: 99 MG/DL
HCT VFR BLD AUTO: 21.2 %
HGB BLD-MCNC: 7.3 G/DL
HYPOCHROMIA BLD QL SMEAR: ABNORMAL
IMM GRANULOCYTES # BLD AUTO: ABNORMAL K/UL
IMM GRANULOCYTES NFR BLD AUTO: ABNORMAL %
LYMPHOCYTES NFR BLD: 6 %
MAGNESIUM SERPL-MCNC: 1.3 MG/DL
MAGNESIUM SERPL-MCNC: 2.4 MG/DL
MCH RBC QN AUTO: 31.1 PG
MCHC RBC AUTO-ENTMCNC: 34.4 G/DL
MCV RBC AUTO: 90 FL
MONOCYTES NFR BLD: 0 %
NEUTROPHILS NFR BLD: 92 %
NEUTS BAND NFR BLD MANUAL: 2 %
NRBC BLD-RTO: 0 /100 WBC
OVALOCYTES BLD QL SMEAR: ABNORMAL
PHOSPHATE SERPL-MCNC: 1.9 MG/DL
PHOSPHATE SERPL-MCNC: 2.6 MG/DL
PLATELET # BLD AUTO: 24 K/UL
PLATELET BLD QL SMEAR: ABNORMAL
PMV BLD AUTO: 11.2 FL
POIKILOCYTOSIS BLD QL SMEAR: SLIGHT
POLYCHROMASIA BLD QL SMEAR: ABNORMAL
POTASSIUM SERPL-SCNC: 4.3 MMOL/L
PROT SERPL-MCNC: 4.2 G/DL
RBC # BLD AUTO: 2.35 M/UL
SODIUM SERPL-SCNC: 140 MMOL/L
TARGETS BLD QL SMEAR: ABNORMAL
VANCOMYCIN TROUGH SERPL-MCNC: 22.5 UG/ML
WBC # BLD AUTO: 2.08 K/UL

## 2018-06-18 PROCEDURE — 80053 COMPREHEN METABOLIC PANEL: CPT

## 2018-06-18 PROCEDURE — 63600175 PHARM REV CODE 636 W HCPCS: Mod: JG | Performed by: INTERNAL MEDICINE

## 2018-06-18 PROCEDURE — 25000003 PHARM REV CODE 250: Performed by: INTERNAL MEDICINE

## 2018-06-18 PROCEDURE — 84100 ASSAY OF PHOSPHORUS: CPT | Mod: 91

## 2018-06-18 PROCEDURE — 82330 ASSAY OF CALCIUM: CPT

## 2018-06-18 PROCEDURE — 80202 ASSAY OF VANCOMYCIN: CPT

## 2018-06-18 PROCEDURE — 86901 BLOOD TYPING SEROLOGIC RH(D): CPT

## 2018-06-18 PROCEDURE — 25000003 PHARM REV CODE 250: Performed by: NURSE PRACTITIONER

## 2018-06-18 PROCEDURE — 63600175 PHARM REV CODE 636 W HCPCS: Performed by: INTERNAL MEDICINE

## 2018-06-18 PROCEDURE — 83735 ASSAY OF MAGNESIUM: CPT | Mod: 91

## 2018-06-18 PROCEDURE — A4216 STERILE WATER/SALINE, 10 ML: HCPCS | Performed by: INTERNAL MEDICINE

## 2018-06-18 PROCEDURE — 25000003 PHARM REV CODE 250: Performed by: STUDENT IN AN ORGANIZED HEALTH CARE EDUCATION/TRAINING PROGRAM

## 2018-06-18 PROCEDURE — 20600001 HC STEP DOWN PRIVATE ROOM

## 2018-06-18 PROCEDURE — 99233 SBSQ HOSP IP/OBS HIGH 50: CPT | Mod: ,,, | Performed by: INTERNAL MEDICINE

## 2018-06-18 PROCEDURE — 86920 COMPATIBILITY TEST SPIN: CPT

## 2018-06-18 PROCEDURE — 63600175 PHARM REV CODE 636 W HCPCS: Performed by: NURSE PRACTITIONER

## 2018-06-18 PROCEDURE — 97530 THERAPEUTIC ACTIVITIES: CPT

## 2018-06-18 RX ORDER — MAGNESIUM SULFATE HEPTAHYDRATE 40 MG/ML
2 INJECTION, SOLUTION INTRAVENOUS ONCE
Status: COMPLETED | OUTPATIENT
Start: 2018-06-18 | End: 2018-06-18

## 2018-06-18 RX ORDER — FUROSEMIDE 10 MG/ML
40 INJECTION INTRAMUSCULAR; INTRAVENOUS ONCE
Status: COMPLETED | OUTPATIENT
Start: 2018-06-18 | End: 2018-06-18

## 2018-06-18 RX ADMIN — SODIUM CHLORIDE: 0.9 INJECTION, SOLUTION INTRAVENOUS at 08:06

## 2018-06-18 RX ADMIN — SODIUM PHOSPHATE, MONOBASIC, MONOHYDRATE 20.01 MMOL: 276; 142 INJECTION, SOLUTION INTRAVENOUS at 10:06

## 2018-06-18 RX ADMIN — CALCIUM CARBONATE (ANTACID) CHEW TAB 500 MG 1000 MG: 500 CHEW TAB at 08:06

## 2018-06-18 RX ADMIN — PROCHLORPERAZINE MALEATE 10 MG: 5 TABLET, FILM COATED ORAL at 12:06

## 2018-06-18 RX ADMIN — Medication 800 MG: at 08:06

## 2018-06-18 RX ADMIN — VANCOMYCIN HYDROCHLORIDE 1500 MG: 10 INJECTION, POWDER, LYOPHILIZED, FOR SOLUTION INTRAVENOUS at 09:06

## 2018-06-18 RX ADMIN — Medication 10 ML: at 05:06

## 2018-06-18 RX ADMIN — MAGNESIUM SULFATE IN WATER 2 G: 40 INJECTION, SOLUTION INTRAVENOUS at 10:06

## 2018-06-18 RX ADMIN — RAMELTEON 8 MG: 8 TABLET, FILM COATED ORAL at 08:06

## 2018-06-18 RX ADMIN — CIPROFLOXACIN HYDROCHLORIDE 500 MG: 500 TABLET, FILM COATED ORAL at 08:06

## 2018-06-18 RX ADMIN — FUROSEMIDE 40 MG: 10 INJECTION, SOLUTION INTRAVENOUS at 10:06

## 2018-06-18 RX ADMIN — ONDANSETRON 8 MG: 2 INJECTION INTRAMUSCULAR; INTRAVENOUS at 04:06

## 2018-06-18 RX ADMIN — ALPRAZOLAM 0.25 MG: 0.25 TABLET ORAL at 03:06

## 2018-06-18 RX ADMIN — DEXAMETHASONE 1 DROP: 1 SUSPENSION OPHTHALMIC at 12:06

## 2018-06-18 RX ADMIN — METOPROLOL TARTRATE 25 MG: 25 TABLET, FILM COATED ORAL at 08:06

## 2018-06-18 RX ADMIN — DEXAMETHASONE 1 DROP: 1 SUSPENSION OPHTHALMIC at 05:06

## 2018-06-18 RX ADMIN — ACYCLOVIR 400 MG: 200 CAPSULE ORAL at 08:06

## 2018-06-18 RX ADMIN — ALPRAZOLAM 0.25 MG: 0.25 TABLET ORAL at 08:06

## 2018-06-18 RX ADMIN — FAMOTIDINE 20 MG: 20 TABLET ORAL at 08:06

## 2018-06-18 RX ADMIN — Medication 10 ML: at 12:06

## 2018-06-18 RX ADMIN — ALPRAZOLAM 0.25 MG: 0.25 TABLET ORAL at 12:06

## 2018-06-18 RX ADMIN — VORICONAZOLE 200 MG: 200 TABLET, FILM COATED ORAL at 08:06

## 2018-06-18 RX ADMIN — MAGNESIUM SULFATE IN WATER 2 G: 40 INJECTION, SOLUTION INTRAVENOUS at 12:06

## 2018-06-18 RX ADMIN — FILGRASTIM 480 MCG: 480 INJECTION, SOLUTION INTRAVENOUS; SUBCUTANEOUS at 06:06

## 2018-06-18 RX ADMIN — ESCITALOPRAM 10 MG: 5 TABLET, FILM COATED ORAL at 08:06

## 2018-06-18 RX ADMIN — SODIUM CHLORIDE 18 MG: 9 INJECTION, SOLUTION INTRAVENOUS at 08:06

## 2018-06-18 RX ADMIN — ONDANSETRON 16 MG: 4 TABLET, FILM COATED ORAL at 06:06

## 2018-06-18 RX ADMIN — FLUDARABINE PHOSPHATE 55 MG: 25 INJECTION, SOLUTION INTRAVENOUS at 08:06

## 2018-06-18 RX ADMIN — Medication 10 ML: at 08:06

## 2018-06-18 RX ADMIN — CYTARABINE 3640 MG: 100 INJECTION, SOLUTION INTRATHECAL; INTRAVENOUS; SUBCUTANEOUS at 12:06

## 2018-06-18 RX ADMIN — CALCIUM CARBONATE (ANTACID) CHEW TAB 500 MG 1000 MG: 500 CHEW TAB at 03:06

## 2018-06-18 RX ADMIN — MAGNESIUM SULFATE IN WATER 2 G: 40 INJECTION, SOLUTION INTRAVENOUS at 08:06

## 2018-06-18 RX ADMIN — LORAZEPAM 0.5 MG: 2 INJECTION INTRAMUSCULAR; INTRAVENOUS at 08:06

## 2018-06-18 RX ADMIN — PANTOPRAZOLE SODIUM 40 MG: 40 TABLET, DELAYED RELEASE ORAL at 08:06

## 2018-06-18 RX ADMIN — VITAMIN D, TAB 1000IU (100/BT) 5000 UNITS: 25 TAB at 08:06

## 2018-06-18 NOTE — ASSESSMENT & PLAN NOTE
- corrected calcium improved to 8.4 this am  - laggressive magnesium replacement today   - vitamin d level low so 5000 u of vitamin D daily started 6/17, will need repeat vitamin D level in 3 months  - continue calcium carbonate to 1000 mg tid

## 2018-06-18 NOTE — TREATMENT PLAN
fludarabine (FLUDARA) 55 mg in sodium chloride 0.9% 100 mL chemo infusion started through TRAM PICC, noted for having positive blood return to infuse over 30 minutes. Dosage and BSA checked by two chemotherapy certified nurses, Dasia Potts, MABEL and Dasia Ashby RN. Premedications given prior to starting infusion. Consent and order in chart/EPIC. Chemotherapy education completed at this time. Chemotherapeutic precautions in place throughout therapy. Will continue to monitor.

## 2018-06-18 NOTE — ASSESSMENT & PLAN NOTE
- mag oxide 800 mg daily  - prn replacements   - will give additional 2 grams IV today (in addition to 4 grams in electrolyte protocol)

## 2018-06-18 NOTE — PLAN OF CARE
Problem: Patient Care Overview  Goal: Individualization & Mutuality  Outcome: Ongoing (interventions implemented as appropriate)  Plan of care reviewed with patient and family.  FAll precautions maintained,side rails up x2,call light in reach, bed in low position and locked, nonskid socks on.  Gettting iv fluids .  Getting electrolye replacements magnesium , sodium phosphate.  Even getting extra dose of magnesium today on top of 2 mag riders under prn orders.  On iv antibiotics.  Afebrile. Ambulating, voiding and encouraged to eat and drink.

## 2018-06-18 NOTE — ASSESSMENT & PLAN NOTE
- blood cultures from 6/11 at 0430 with gram + cocci, staph epidermis sensitive for Vancomycin, CXR and UA unremarkable.   - Continue vancomycin with projected end date of 6/27  - vanc trough 10.4 on 6/160 increased vanc dose to 1500 mg BID  - surveillance blood cultures repeated 6/13 NGTD  - de-escalated aztreonam back to prophylactic cipro 6/16

## 2018-06-18 NOTE — PT/OT/SLP PROGRESS
Physical Therapy Treatment    Patient Name:  Noreen Mac   MRN:  [id number]    Recommendations:     Discharge Recommendations:  home   Discharge Equipment Recommendations: none   Barriers to discharge: None    Assessment:     Noreen Mac is a 68 y.o. 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  She presents with the following impairments/functional limitations:  weakness, impaired endurance, impaired functional mobilty, impaired cardiopulmonary response to activity, gait instability. Pt able to perform functional mobility without physical assist or use of DME. However, pt demo'd impaired endurance and SOB with ambulation, requiring intermittent rest breaks and self-pacing for energy conservation and decreased SOB. Pt would continue to benefit from skilled acute PT in order to address current deficits and progress functional mobility.     Rehab Prognosis:  good; patient would benefit from acute skilled PT services to address these deficits and reach maximum level of function.      Recent Surgery: * No surgery found *      Plan:     During this hospitalization, patient to be seen 1 x/week to address the above listed problems via gait training, therapeutic activities, therapeutic exercises, neuromuscular re-education  · Plan of Care Expires:  06/29/18   Plan of Care Reviewed with: patient    Subjective     Communicated with RN prior to session.  Patient found supine upon PT entry to room, agreeable to treatment.      Chief Complaint: none noted  Patient comments/goals: return home   Pain/Comfort:  · Pain Rating 1: 0/10    Patients cultural, spiritual, Spiritism conflicts given the current situation: none noted     Objective:     Patient found with: PICC line     General Precautions: Standard, fall   Orthopedic Precautions:N/A   Braces: N/A     Functional Mobility:  · Bed Mobility:     · Supine to Sit: modified independence and with HOB elevated  · Sit to  Supine: modified independence and with HOB elevated  · Transfers:     · Sit to Stand:  supervision with no AD  · Gait: ~350 ft. with supervision and no AD  · Mask donned prior to ambulation outside of room  · Intermittent standing rest breaks 2* SOB  · Cues for decreased arianna and self-pacing for energy conservation and decreased SOB       AM-PAC 6 CLICK MOBILITY  Turning over in bed (including adjusting bedclothes, sheets and blankets)?: 4  Sitting down on and standing up from a chair with arms (e.g., wheelchair, bedside commode, etc.): 4  Moving from lying on back to sitting on the side of the bed?: 4  Moving to and from a bed to a chair (including a wheelchair)?: 4  Need to walk in hospital room?: 4  Climbing 3-5 steps with a railing?: 3  Total Score: 23       Therapeutic Activities and Exercises:   Pt instructed in AP to be performed (I) 10x/hour. Pt verbalized and demonstrated understanding.   Pt educated on the importance of OOB activity and encouraged to sit UIC for majority of day as tolerated. Pt instructed to elevate BLE when in chair 2* edema. Pt v/u.     Patient left supine with all lines intact and call button in reach..    GOALS:    Physical Therapy Goals        Problem: Physical Therapy Goal    Goal Priority Disciplines Outcome Goal Variances Interventions   Physical Therapy Goal     PT/OT, PT Ongoing (interventions implemented as appropriate)     Description:  Goals to be met by: 6/29/18    1. Pt will be (I) in LE HEP in order to maintain LE strength and prevent deconditioning during hospital admission.  2. Pt will be (I) with daily walking program involving ambulating community distance 3x daily with family or RN assist as needed.                     Time Tracking:     PT Received On: 06/18/18  PT Start Time: 0934     PT Stop Time: 0949  PT Total Time (min): 15 min     Billable Minutes: Therapeutic Activity 15    Treatment Type: Treatment  PT/PTA: PT     PTA Visit Number: 0     Adrianne Jerez  PT, DPT   6/18/2018  [phone]

## 2018-06-18 NOTE — PLAN OF CARE
Problem: Patient Care Overview  Goal: Plan of Care Review  Outcome: Ongoing (interventions implemented as appropriate)  Day 3 of FLAG JENNIFER complete. Tolerated well. . Patient is progressing and involved with plan of care. Frequent rounds made to assess pain and safety. Pt communicating needs throughout shift. Up ad gunnar. Tolerating diet, voiding without difficulty. No c/o pain this shift..Nausea controled with prn Zofran.  Vanc continued as ordered. IVF continued as ordered.   All vitals stable; no acute events this shift. Pt. Remaining free from falls or injury throughout shift; bed in lowest position; side rails up X2; call light within reach; pt instructed to call for assistance as needed - verbalized understanding. Q2h rounding on patient. Will continue to monitor.

## 2018-06-18 NOTE — PLAN OF CARE
Problem: Physical Therapy Goal  Goal: Physical Therapy Goal  Goals to be met by: 6/29/18    1. Pt will be (I) in LE HEP in order to maintain LE strength and prevent deconditioning during hospital admission.  2. Pt will be (I) with daily walking program involving ambulating community distance 3x daily with family or RN assist as needed.    Outcome: Ongoing (interventions implemented as appropriate)  Goals updated. Pt progressing towards goals.    Adrianne Jerez, PT, DPT   6/18/2018  [phone]

## 2018-06-18 NOTE — SUBJECTIVE & OBJECTIVE
Subjective:     Interval History:   Day 21 of 7+3 and Day 4 of FLAG JENNIFER. Tolerating well with some nausea controlled with Zofran. VSS, afebrile. ANC 1900 on Neupogen. Continues Vanc for staph epi bacteremia. Multiple electrolytes replaced today. Complains of edema to lower extremities, not improved after 20 of lasix yesterday. No sob or CP.     Objective:     Vital Signs (Most Recent):  Temp: 97.7 °F (36.5 °C) (06/18/18 0400)  Pulse: 73 (06/18/18 0400)  Resp: 18 (06/18/18 0400)  BP: 122/80 (06/18/18 0400)  SpO2: 95 % (06/18/18 0400) Vital Signs (24h Range):  Temp:  [97.7 °F (36.5 °C)-98.6 °F (37 °C)] 97.7 °F (36.5 °C)  Pulse:  [68-73] 73  Resp:  [17-19] 18  SpO2:  [94 %-98 %] 95 %  BP: (119-134)/(58-80) 122/80     Weight: 80 kg (176 lb 7.7 oz)  Body mass index is 31.26 kg/m².  Body surface area is 1.89 meters squared.      Intake/Output - Last 3 Shifts       06/16 0700 - 06/17 0659 06/17 0700 - 06/18 0659 06/18 0700 - 06/19 0659    P.O. 550 560     I.V. (mL/kg) 2553 (32.3) 2393.3 (29.9)     IV Piggyback 1100 1000     Total Intake(mL/kg) 4203 (53.1) 3953.3 (49.4)     Urine (mL/kg/hr) 1350 (0.7) 3400 (1.8)     Total Output 1350 3400      Net +2853 +553.3             Urine Occurrence 3 x 1 x     Stool Occurrence  1 x           Physical Exam   Constitutional: She is oriented to person, place, and time. She appears well-developed and well-nourished. No distress.   HENT:   Head: Normocephalic and atraumatic.   Eyes: Pupils are equal, round, and reactive to light.   Neck: Normal range of motion.   Cardiovascular: Normal rate and regular rhythm.    No murmur heard.  Pulmonary/Chest: Effort normal and breath sounds normal. No respiratory distress.   Abdominal: Soft. Bowel sounds are normal. There is no tenderness.   Genitourinary:   Genitourinary Comments: rash resolved    Musculoskeletal: Normal range of motion. She exhibits edema (bilateral 2+ pitting edema up to knee).   Neurological: She is alert and oriented to  person, place, and time.   Skin: Skin is warm and dry.   Vitals reviewed.      Significant Labs:   CBC:   Recent Labs  Lab 06/17/18  0400 06/18/18  0400   WBC 0.74* 2.08*   HGB 7.3* 7.3*   HCT 21.4* 21.2*   PLT 20* 24*    and CMP:   Recent Labs  Lab 06/17/18  0400 06/18/18  0400    140   K 4.8 4.3   * 112*   CO2 21* 21*   * 99   BUN 10 16   CREATININE 0.6 0.5   CALCIUM 6.5* 6.6*   PROT 4.7* 4.2*   ALBUMIN 1.8* 1.7*   BILITOT 0.9 0.8   ALKPHOS 877* 779*   AST 15 45*   ALT 17 24   ANIONGAP 6* 7*   EGFRNONAA >60.0 >60.0       Diagnostic Results:  None

## 2018-06-18 NOTE — ASSESSMENT & PLAN NOTE
- baseline normal  - bilirubin improved and wnl at 0.8 today  - LFTs wnl except for ALP elevated at 779  - holding midostaurin while patient gets induction chemo

## 2018-06-18 NOTE — PROGRESS NOTES
[facility]  Hematology  Bone Marrow Transplant  Progress Note    Patient Name: Noreen Mac  Admission Date: 5/24/2018  Hospital Length of Stay: 25 days  Code Status: Full Code    Subjective:     Interval History:   Day 21 of 7+3 and Day 4 of FLAG JENNIFER. Tolerating well with some nausea controlled with Zofran. VSS, afebrile. ANC 1900 on Neupogen. Continues Vanc for staph epi bacteremia. Multiple electrolytes replaced today. Complains of edema to lower extremities, not improved after 20 of lasix yesterday. No sob or CP.     Objective:     Vital Signs (Most Recent):  Temp: 97.7 °F (36.5 °C) (06/18/18 0400)  Pulse: 73 (06/18/18 0400)  Resp: 18 (06/18/18 0400)  BP: 122/80 (06/18/18 0400)  SpO2: 95 % (06/18/18 0400) Vital Signs (24h Range):  Temp:  [97.7 °F (36.5 °C)-98.6 °F (37 °C)] 97.7 °F (36.5 °C)  Pulse:  [68-73] 73  Resp:  [17-19] 18  SpO2:  [94 %-98 %] 95 %  BP: (119-134)/(58-80) 122/80     Weight: 80 kg (176 lb 7.7 oz)  Body mass index is 31.26 kg/m².  Body surface area is 1.89 meters squared.      Intake/Output - Last 3 Shifts       06/16 0700 - 06/17 0659 06/17 0700 - 06/18 0659 06/18 0700 - 06/19 0659    P.O. 550 560     I.V. (mL/kg) 2553 (32.3) 2393.3 (29.9)     IV Piggyback 1100 1000     Total Intake(mL/kg) 4203 (53.1) 3953.3 (49.4)     Urine (mL/kg/hr) 1350 (0.7) 3400 (1.8)     Total Output 1350 3400      Net +2853 +553.3             Urine Occurrence 3 x 1 x     Stool Occurrence  1 x           Physical Exam   Constitutional: She is oriented to person, place, and time. She appears well-developed and well-nourished. No distress.   HENT:   Head: Normocephalic and atraumatic.   Eyes: Pupils are equal, round, and reactive to light.   Neck: Normal range of motion.   Cardiovascular: Normal rate and regular rhythm.    No murmur heard.  Pulmonary/Chest: Effort normal and breath sounds normal. No respiratory distress.   Abdominal: Soft. Bowel sounds are normal. There is no tenderness.    Genitourinary:   Genitourinary Comments: rash resolved    Musculoskeletal: Normal range of motion. She exhibits edema (bilateral 2+ pitting edema up to knee).   Neurological: She is alert and oriented to person, place, and time.   Skin: Skin is warm and dry.   Vitals reviewed.      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 Ordered routine labs in addition to FLT3, NGS, and AML FISH.  - lo res HLA typing on 5/26/18 and hi res on 5/27/18 done in anticipation of possible need for future transplant   - Pt with two daughters, two full sisters (in their mid 60s, one with brain aneurysm hx, other one without any medical issues), and one full brother (61 y/o healthy).  - ECHO ordered 5/25/18, EF 65%  - PICC line placed 5/25/18   - initially on hydrea, discontinued on 5/28.   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  - started on induction therapy on 5/29/18.  - Day 21 of 7+3.    - allopurinol stopped 6/11  - prophylactic antimicrobials: Acyclovir, Voriconazole  - will need CNS Prophylaxis given her elevated WBC (high risk) on admission after count recovery.  - NPM1 +, CEBPA (-), FLT3 (+).  On Midostaurin 50 mg PO BID until Day 14 (held starting 6/8 to 6/11). Will continue til day 21  (6/18) or until re-induction  - day 14 bone marrow biopsy completed 6/11 showing persistent disease with 15% CD 34 positive blasts  - Day 4 of FLAG-JENNIFER  - repeat 2D echo on 6/15 with preserved EF of 60%          Staphylococcus epidermidis bacteremia    - blood cultures from 6/11 at 0430 with gram + cocci, staph epidermis sensitive for Vancomycin, CXR and UA unremarkable.   - Continue vancomycin with projected end date of 6/27  - vanc trough 10.4 on 6/160 increased vanc dose to 1500 mg BID  - surveillance blood cultures repeated 6/13 NGTD  - de-escalated aztreonam back to prophylactic cipro 6/16        Pancytopenia    - secondary to chemotherapy.   - WBC up to 2.08 and ANC 1900, on Neupogen per FLAG JENNIFER protocol  - hgb 7.3 and platelet count 24,000 today  - tranfuse if Hb < 7 g/dL and Plt < 10,000/mm3 or active bleed.           Abnormal liver enzymes    - baseline normal  - bilirubin improved and wnl at 0.8 today  - LFTs wnl except for ALP elevated at 779  - holding midostaurin while patient gets induction chemo        Fluid overload    - up 5 kg from admission  - lasix 20 mg IV on 6/17  - continue to have lower extremity edema and up additional 2 lbs in the past 24 hours (Due to excessive IV electrolyte replacement, chemo and IV antibiotic)  - will give lasix 40 mg IV x 1 today        Hypocalcemia    - corrected calcium improved to 8.4 this am  - laggressive magnesium replacement today   - vitamin d level low so 5000 u of vitamin D daily started 6/17, will need repeat vitamin D level in 3 months  - continue calcium carbonate to 1000 mg tid        Hypophosphatemia    - phos 1.9 today  - prn replacements per electrolyte protocol        Hypomagnesemia    - mag oxide 800 mg daily  - prn replacements   - will give additional 2 grams IV today (in addition to 4 grams in electrolyte protocol)        MCTD (mixed connective tissue disease)    - MCTD-NOS  - previously on MTX and Prednisone.   - off therapy now and otherwise  doing well.   - can follow up outpatient with her Rheumatologist upon discharge.         Pulmonary nodule    - CXR suggestive of 7 mm slightly irregular shaped radiodensity projected over the left upper lung zone which could represent a calcified granuloma but remains indeterminate.  - CT Chest on 5/28: Multiple scattered noncalcified pulmonary granulomas, the largest measuring 8 mm in the lateral segment of the right middle lobe.   - repeat non-contrast chest CT at 6-12 months         Insomnia    - PRN nightly Xanax.         HTN (hypertension)    - history of SVT and reports intermittent palpitations at times.  - EKG on 5/28 - NSR.  - increased metoprolol XL to 25 mg QD,she feels better with this dose.  - metoprolol tartrate changed on 6/13 to 25 mg bid (hold for SBP <100) due to hypotension  - BP improved        GERD (gastroesophageal reflux disease)    - takes Omeprazole at home, now on pepcid here  - Protonix added 6/13  - reflux improved         Osteoporosis    - takes prolia once every 6 months, last given mid April 2018   - no acute issue         Depression    - no acute issue   - continue home Escitalopram             VTE Risk Mitigation         Ordered     heparin, porcine (PF) 100 unit/mL injection flush 300 Units  As needed (PRN)      06/15/18 1657     Place MAYLIN hose  Until discontinued      05/25/18 0032     IP VTE HIGH RISK PATIENT  Once      05/25/18 0032          Disposition: pending completion of chemo, restaging and count recovery    Sweetie Lepe NP  Bone Marrow Transplant  [facility]

## 2018-06-18 NOTE — ASSESSMENT & PLAN NOTE
- up 5 kg from admission  - lasix 20 mg IV on 6/17  - continue to have lower extremity edema and up additional 2 lbs in the past 24 hours (Due to excessive IV electrolyte replacement, chemo and IV antibiotic)  - will give lasix 40 mg IV x 1 today

## 2018-06-18 NOTE — ASSESSMENT & PLAN NOTE
- secondary to chemotherapy.   - WBC up to 2.08 and ANC 1900, on Neupogen per FLAG JENNIFER protocol  - hgb 7.3 and platelet count 24,000 today  - tranfuse if Hb < 7 g/dL and Plt < 10,000/mm3 or active bleed.

## 2018-06-18 NOTE — ASSESSMENT & PLAN NOTE
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 Ordered routine labs in addition to FLT3, NGS, and AML FISH.  - lo res HLA typing on 5/26/18 and hi res on 5/27/18 done in anticipation of possible need for future transplant   - Pt with two daughters, two full sisters (in their mid 60s, one with brain aneurysm hx, other one without any medical issues), and one full brother (59 y/o healthy).  - ECHO ordered 5/25/18, EF 65%  - PICC line placed 5/25/18   - initially on hydrea, discontinued on 5/28.   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  - started on induction therapy on 5/29/18.  - Day 21 of 7+3.    - allopurinol stopped 6/11  - prophylactic antimicrobials: Acyclovir, Voriconazole  - will need CNS Prophylaxis given her elevated WBC (high risk) on admission after count recovery.  - NPM1 +, CEBPA (-), FLT3 (+).  On Midostaurin 50 mg PO BID until Day 14 (held starting 6/8 to 6/11). Will continue til day 21 (6/18) or until re-induction  - day 14 bone marrow biopsy completed 6/11 showing persistent disease with 15% CD 34 positive blasts  - Day 4 of FLAG-JENNIFER  - repeat 2D echo on 6/15 with preserved EF of 60%

## 2018-06-18 NOTE — TREATMENT PLAN
cytarabine (PF) (CYTOSAR) 2,000 mg/m2 = 3,640 mg in sodium chloride 0.9% 250 mL chemo infusion  started through TRAM PICC, noted for having positive blood return to infuse over 30 minutes. Dosage and BSA checked by two chemotherapy certified nurses, Susanne Andrade RN and Dasia Ashby RN. Premedications given prior to starting infusion. Consent and order in chart/EPIC. Chemotherapy education completed at this time. Chemotherapeutic precautions in place throughout therapy. Will continue to monitor.

## 2018-06-18 NOTE — ASSESSMENT & PLAN NOTE
- history of SVT and reports intermittent palpitations at times.  - EKG on 5/28 - NSR.  - increased metoprolol XL to 25 mg QD,she feels better with this dose.  - metoprolol tartrate changed on 6/13 to 25 mg bid (hold for SBP <100) due to hypotension  - BP improved

## 2018-06-19 LAB
ALBUMIN SERPL BCP-MCNC: 1.7 G/DL
ALBUMIN SERPL BCP-MCNC: 1.8 G/DL
ALP SERPL-CCNC: 496 U/L
ALP SERPL-CCNC: 804 U/L
ALT SERPL W/O P-5'-P-CCNC: 11 U/L
ALT SERPL W/O P-5'-P-CCNC: 41 U/L
ANION GAP SERPL CALC-SCNC: 5 MMOL/L
ANION GAP SERPL CALC-SCNC: 6 MMOL/L
ANISOCYTOSIS BLD QL SMEAR: SLIGHT
AST SERPL-CCNC: 9 U/L
AST SERPL-CCNC: 90 U/L
BASOPHILS # BLD AUTO: ABNORMAL K/UL
BASOPHILS NFR BLD: 0 %
BILIRUB SERPL-MCNC: 1.1 MG/DL
BILIRUB SERPL-MCNC: 1.9 MG/DL
BUN SERPL-MCNC: 17 MG/DL
BUN SERPL-MCNC: 8 MG/DL
CA-I BLDV-SCNC: 0.97 MMOL/L
CALCIUM SERPL-MCNC: 6.2 MG/DL
CALCIUM SERPL-MCNC: 6.4 MG/DL
CHLORIDE SERPL-SCNC: 110 MMOL/L
CHLORIDE SERPL-SCNC: 111 MMOL/L
CHROM BANDING METHOD: NORMAL
CHROMOSOME ANALYSIS BM ADDITIONAL INFORMATION: NORMAL
CHROMOSOME ANALYSIS BM RELEASED BY: NORMAL
CHROMOSOME ANALYSIS BM RESULT SUMMARY: NORMAL
CLINICAL CYTOGENETICIST REVIEW: NORMAL
CO2 SERPL-SCNC: 19 MMOL/L
CO2 SERPL-SCNC: 24 MMOL/L
CREAT SERPL-MCNC: 0.6 MG/DL
CREAT SERPL-MCNC: 0.6 MG/DL
DIFFERENTIAL METHOD: ABNORMAL
EOSINOPHIL # BLD AUTO: ABNORMAL K/UL
EOSINOPHIL NFR BLD: 0 %
ERYTHROCYTE [DISTWIDTH] IN BLOOD BY AUTOMATED COUNT: 17 %
EST. GFR  (AFRICAN AMERICAN): >60 ML/MIN/1.73 M^2
EST. GFR  (AFRICAN AMERICAN): >60 ML/MIN/1.73 M^2
EST. GFR  (NON AFRICAN AMERICAN): >60 ML/MIN/1.73 M^2
EST. GFR  (NON AFRICAN AMERICAN): >60 ML/MIN/1.73 M^2
GLUCOSE SERPL-MCNC: 92 MG/DL
GLUCOSE SERPL-MCNC: 95 MG/DL
HCT VFR BLD AUTO: 20.9 %
HGB BLD-MCNC: 7.2 G/DL
HYPOCHROMIA BLD QL SMEAR: ABNORMAL
IMM GRANULOCYTES # BLD AUTO: ABNORMAL K/UL
IMM GRANULOCYTES NFR BLD AUTO: ABNORMAL %
KARYOTYP MAR: NORMAL
LYMPHOCYTES # BLD AUTO: ABNORMAL K/UL
LYMPHOCYTES NFR BLD: 4 %
MAGNESIUM SERPL-MCNC: 1.1 MG/DL
MAGNESIUM SERPL-MCNC: 1.4 MG/DL
MCH RBC QN AUTO: 31.2 PG
MCHC RBC AUTO-ENTMCNC: 34.4 G/DL
MCV RBC AUTO: 91 FL
MONOCYTES # BLD AUTO: ABNORMAL K/UL
MONOCYTES NFR BLD: 0 %
NEUTROPHILS NFR BLD: 96 %
NRBC BLD-RTO: 0 /100 WBC
OVALOCYTES BLD QL SMEAR: ABNORMAL
PHOSPHATE SERPL-MCNC: 1.6 MG/DL
PHOSPHATE SERPL-MCNC: 2.8 MG/DL
PLATELET # BLD AUTO: 24 K/UL
PLATELET BLD QL SMEAR: ABNORMAL
PMV BLD AUTO: 12.7 FL
POIKILOCYTOSIS BLD QL SMEAR: SLIGHT
POLYCHROMASIA BLD QL SMEAR: ABNORMAL
POTASSIUM SERPL-SCNC: 3.1 MMOL/L
POTASSIUM SERPL-SCNC: 3.9 MMOL/L
PROT SERPL-MCNC: 4 G/DL
PROT SERPL-MCNC: 4.4 G/DL
RBC # BLD AUTO: 2.31 M/UL
REASON FOR REFERRAL (NARRATIVE): NORMAL
REF LAB TEST METHOD: NORMAL
SODIUM SERPL-SCNC: 136 MMOL/L
SODIUM SERPL-SCNC: 139 MMOL/L
SPECIMEN SOURCE: NORMAL
SPECIMEN: NORMAL
TARGETS BLD QL SMEAR: ABNORMAL
WBC # BLD AUTO: 3.99 K/UL

## 2018-06-19 PROCEDURE — 25000003 PHARM REV CODE 250: Performed by: STUDENT IN AN ORGANIZED HEALTH CARE EDUCATION/TRAINING PROGRAM

## 2018-06-19 PROCEDURE — 25000003 PHARM REV CODE 250: Performed by: INTERNAL MEDICINE

## 2018-06-19 PROCEDURE — 63600175 PHARM REV CODE 636 W HCPCS: Performed by: INTERNAL MEDICINE

## 2018-06-19 PROCEDURE — 83735 ASSAY OF MAGNESIUM: CPT

## 2018-06-19 PROCEDURE — 85007 BL SMEAR W/DIFF WBC COUNT: CPT

## 2018-06-19 PROCEDURE — 99223 1ST HOSP IP/OBS HIGH 75: CPT | Mod: ,,, | Performed by: INTERNAL MEDICINE

## 2018-06-19 PROCEDURE — 97803 MED NUTRITION INDIV SUBSEQ: CPT

## 2018-06-19 PROCEDURE — 84100 ASSAY OF PHOSPHORUS: CPT | Mod: 91

## 2018-06-19 PROCEDURE — 82330 ASSAY OF CALCIUM: CPT

## 2018-06-19 PROCEDURE — 80053 COMPREHEN METABOLIC PANEL: CPT

## 2018-06-19 PROCEDURE — A4216 STERILE WATER/SALINE, 10 ML: HCPCS | Performed by: INTERNAL MEDICINE

## 2018-06-19 PROCEDURE — 25000003 PHARM REV CODE 250: Performed by: NURSE PRACTITIONER

## 2018-06-19 PROCEDURE — 63600175 PHARM REV CODE 636 W HCPCS: Performed by: NURSE PRACTITIONER

## 2018-06-19 PROCEDURE — 85027 COMPLETE CBC AUTOMATED: CPT

## 2018-06-19 PROCEDURE — 20600001 HC STEP DOWN PRIVATE ROOM

## 2018-06-19 PROCEDURE — 99233 SBSQ HOSP IP/OBS HIGH 50: CPT | Mod: ,,, | Performed by: INTERNAL MEDICINE

## 2018-06-19 PROCEDURE — 25000003 PHARM REV CODE 250: Performed by: HOSPITALIST

## 2018-06-19 RX ORDER — METRONIDAZOLE 500 MG/1
500 TABLET ORAL EVERY 8 HOURS
Status: DISCONTINUED | OUTPATIENT
Start: 2018-06-19 | End: 2018-06-20

## 2018-06-19 RX ADMIN — Medication 1250 MG: at 08:06

## 2018-06-19 RX ADMIN — DEXAMETHASONE 1 DROP: 1 SUSPENSION OPHTHALMIC at 12:06

## 2018-06-19 RX ADMIN — ONDANSETRON 8 MG: 2 INJECTION INTRAMUSCULAR; INTRAVENOUS at 08:06

## 2018-06-19 RX ADMIN — PANTOPRAZOLE SODIUM 40 MG: 40 TABLET, DELAYED RELEASE ORAL at 08:06

## 2018-06-19 RX ADMIN — Medication 800 MG: at 08:06

## 2018-06-19 RX ADMIN — CALCIUM CARBONATE (ANTACID) CHEW TAB 500 MG 1000 MG: 500 CHEW TAB at 08:06

## 2018-06-19 RX ADMIN — SODIUM PHOSPHATE, MONOBASIC, MONOHYDRATE 20.01 MMOL: 276; 142 INJECTION, SOLUTION INTRAVENOUS at 10:06

## 2018-06-19 RX ADMIN — FILGRASTIM 480 MCG: 480 INJECTION, SOLUTION INTRAVENOUS; SUBCUTANEOUS at 07:06

## 2018-06-19 RX ADMIN — MAGNESIUM SULFATE IN WATER 2 G: 40 INJECTION, SOLUTION INTRAVENOUS at 11:06

## 2018-06-19 RX ADMIN — CIPROFLOXACIN HYDROCHLORIDE 500 MG: 500 TABLET, FILM COATED ORAL at 08:06

## 2018-06-19 RX ADMIN — DEXAMETHASONE 1 DROP: 1 SUSPENSION OPHTHALMIC at 06:06

## 2018-06-19 RX ADMIN — MAGNESIUM SULFATE IN WATER 2 G: 40 INJECTION, SOLUTION INTRAVENOUS at 10:06

## 2018-06-19 RX ADMIN — Medication 10 ML: at 11:06

## 2018-06-19 RX ADMIN — Medication 10 ML: at 05:06

## 2018-06-19 RX ADMIN — ACYCLOVIR 400 MG: 200 CAPSULE ORAL at 08:06

## 2018-06-19 RX ADMIN — ESCITALOPRAM 10 MG: 5 TABLET, FILM COATED ORAL at 08:06

## 2018-06-19 RX ADMIN — ONDANSETRON 8 MG: 2 INJECTION INTRAMUSCULAR; INTRAVENOUS at 03:06

## 2018-06-19 RX ADMIN — Medication 10 ML: at 08:06

## 2018-06-19 RX ADMIN — MAGNESIUM SULFATE IN WATER 2 G: 40 INJECTION, SOLUTION INTRAVENOUS at 08:06

## 2018-06-19 RX ADMIN — DEXAMETHASONE 1 DROP: 1 SUSPENSION OPHTHALMIC at 11:06

## 2018-06-19 RX ADMIN — Medication 1250 MG: at 07:06

## 2018-06-19 RX ADMIN — ALPRAZOLAM 0.25 MG: 0.25 TABLET ORAL at 08:06

## 2018-06-19 RX ADMIN — METOPROLOL TARTRATE 25 MG: 25 TABLET, FILM COATED ORAL at 08:06

## 2018-06-19 RX ADMIN — LORAZEPAM 0.5 MG: 2 INJECTION INTRAMUSCULAR; INTRAVENOUS at 09:06

## 2018-06-19 RX ADMIN — SODIUM CHLORIDE: 0.9 INJECTION, SOLUTION INTRAVENOUS at 10:06

## 2018-06-19 RX ADMIN — VORICONAZOLE 200 MG: 200 TABLET, FILM COATED ORAL at 08:06

## 2018-06-19 RX ADMIN — FAMOTIDINE 20 MG: 20 TABLET ORAL at 08:06

## 2018-06-19 RX ADMIN — ONDANSETRON 16 MG: 4 TABLET, FILM COATED ORAL at 07:06

## 2018-06-19 RX ADMIN — FLUDARABINE PHOSPHATE 55 MG: 25 INJECTION, SOLUTION INTRAVENOUS at 08:06

## 2018-06-19 RX ADMIN — CALCIUM CARBONATE (ANTACID) CHEW TAB 500 MG 1000 MG: 500 CHEW TAB at 02:06

## 2018-06-19 RX ADMIN — CYTARABINE 3640 MG: 100 INJECTION, SOLUTION INTRATHECAL; INTRAVENOUS; SUBCUTANEOUS at 12:06

## 2018-06-19 RX ADMIN — CALCIUM GLUCONATE 2000 MG: 98 INJECTION, SOLUTION INTRAVENOUS at 10:06

## 2018-06-19 RX ADMIN — SODIUM CHLORIDE: 0.9 INJECTION, SOLUTION INTRAVENOUS at 08:06

## 2018-06-19 RX ADMIN — SODIUM PHOSPHATE, MONOBASIC, MONOHYDRATE 15 MMOL: 276; 142 INJECTION, SOLUTION INTRAVENOUS at 02:06

## 2018-06-19 RX ADMIN — DEXAMETHASONE 1 DROP: 1 SUSPENSION OPHTHALMIC at 05:06

## 2018-06-19 RX ADMIN — Medication 10 ML: at 01:06

## 2018-06-19 RX ADMIN — ALPRAZOLAM 0.25 MG: 0.25 TABLET ORAL at 05:06

## 2018-06-19 RX ADMIN — Medication 2 MG: at 01:06

## 2018-06-19 RX ADMIN — METRONIDAZOLE 500 MG: 500 TABLET ORAL at 09:06

## 2018-06-19 RX ADMIN — VITAMIN D, TAB 1000IU (100/BT) 5000 UNITS: 25 TAB at 08:06

## 2018-06-19 NOTE — TREATMENT PLAN
cytarabine (PF) (CYTOSAR) 2,000 mg/m2 = 3,640 mg in sodium chloride 0.9% 250 mL chemo infusion  started through TRAM PICC, noted for having positive blood return to infuse over 30 minutes. Dosage and BSA checked by two chemotherapy certified nurses, Dasia Potts RN and Dasia Ashby RN. Premedications given prior to starting infusion. Consent and order in chart/EPIC. Chemotherapy education completed at this time. Chemotherapeutic precautions in place throughout therapy. Will continue to monitor.

## 2018-06-19 NOTE — SUBJECTIVE & OBJECTIVE
Subjective:     Interval History:   Day 22 of 7+3 and Day 5 of FLAG JENNIFER. VSS, afebrile, ANC 3900. Vanc trough elevated and dose adjusted this am. Lower extremity edema improved after 40 of lasix yesterday, net negative 300 cc I&O. Mild nausea, no abdominal pain or diarrhea. Poor appetite but no difficulty eating or taking pills.      Objective:     Vital Signs (Most Recent):  Temp: 98.6 °F (37 °C) (06/19/18 0327)  Pulse: 69 (06/19/18 0327)  Resp: 20 (06/19/18 0327)  BP: (!) 117/59 (06/19/18 0327)  SpO2: 96 % (06/19/18 0327) Vital Signs (24h Range):  Temp:  [97.9 °F (36.6 °C)-98.6 °F (37 °C)] 98.6 °F (37 °C)  Pulse:  [59-69] 69  Resp:  [18-20] 20  SpO2:  [92 %-98 %] 96 %  BP: (117-132)/(58-63) 117/59     Weight: 78.5 kg (173 lb 1 oz)  Body mass index is 30.66 kg/m².  Body surface area is 1.87 meters squared.    Intake/Output - Last 3 Shifts       06/17 0700 - 06/18 0659 06/18 0700 - 06/19 0659 06/19 0700 - 06/20 0659    P.O. 560 1015     I.V. (mL/kg) 2393.3 (29.9) 2477.7 (31.6)     IV Piggyback 1000 650     Total Intake(mL/kg) 3953.3 (49.4) 4142.7 (52.8)     Urine (mL/kg/hr) 3400 (1.8) 4400 (2.3)     Total Output 3400 4400      Net +553.3 -257.3             Urine Occurrence 1 x 3 x     Stool Occurrence 1 x 1 x           Physical Exam   Constitutional: She is oriented to person, place, and time. She appears well-developed and well-nourished. No distress.   HENT:   Mouth/Throat: Oropharynx is clear and moist. No oropharyngeal exudate or posterior oropharyngeal erythema.   Eyes: Conjunctivae are normal. Pupils are equal, round, and reactive to light. No scleral icterus.   Neck: Normal range of motion. Neck supple. No thyromegaly present.   Cardiovascular: Normal rate, regular rhythm, normal heart sounds and intact distal pulses.    Pulmonary/Chest: Effort normal and breath sounds normal. No respiratory distress.   Abdominal: Soft. Bowel sounds are normal. She exhibits no distension. There is no splenomegaly or  hepatomegaly. There is no tenderness.   Musculoskeletal: Normal range of motion. She exhibits edema. She exhibits no tenderness.   +1 BLE   Lymphadenopathy:     She has no cervical adenopathy.     She has no axillary adenopathy.   Neurological: She is alert and oriented to person, place, and time. No cranial nerve deficit. Coordination normal.   Skin: No rash noted. No cyanosis. No pallor. Nails show no clubbing.   PICC intact R arm no redness or drainage   Psychiatric: She has a normal mood and affect. Thought content normal.   Vitals reviewed.      Significant Labs:   CBC:   Recent Labs  Lab 06/18/18  0400 06/19/18  0400   WBC 2.08* 3.99   HGB 7.3* 7.2*   HCT 21.2* 20.9*   PLT 24* 24*    and CMP:   Recent Labs  Lab 06/18/18  0400 06/19/18  0400    139   K 4.3 3.9   * 110   CO2 21* 24   GLU 99 95   BUN 16 17   CREATININE 0.5 0.6   CALCIUM 6.6* 6.2*   PROT 4.2* 4.0*   ALBUMIN 1.7* 1.8*   BILITOT 0.8 1.1*   ALKPHOS 779* 804*   AST 45* 90*   ALT 24 41   ANIONGAP 7* 5*   EGFRNONAA >60.0 >60.0       Diagnostic Results:  None

## 2018-06-19 NOTE — PLAN OF CARE
Problem: Patient Care Overview  Goal: Plan of Care Review  Outcome: Ongoing (interventions implemented as appropriate)  Day 4 of FLAG JENNIFER complete. Tolerated well.  Patient is progressing and involved with plan of care. Frequent rounds made to assess pain and safety. Pt communicating needs throughout shift. Up ad gunnar. Poor appetite, voiding without difficulty. No c/o pain this shift. Pt very anxious/nauseas tonight, relieved with IV Ativan.  Vanc held d/t high vanc trough per MD order.  IVF continued as ordered.   All vitals stable; no acute events this shift. Pt. Remaining free from falls or injury throughout shift; bed in lowest position; side rails up X2; call light within reach; pt instructed to call for assistance as needed - verbalized understanding. Q2h rounding on patient. Will continue to monitor.

## 2018-06-19 NOTE — PROGRESS NOTES
" [facility]  Adult Nutrition  Progress Note    SUMMARY       Recommendations    Recommendation/Intervention:   1. Continue w/ Regular diet.   2. Encourage PO intake >/= 75% EEN/EPN   3. If PO intake is <50% encourage HVP w/ each meal, small frequent meal, snacks, ONS until nausea resolves  4. 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 ANC 3900. day 14 bone marrow biopsy shows persistent disease.  Edema improved, pt still c/o  nausea, no abdominal pain or diarrhea. Poor appetite 25-50% but no difficulty eating   Nutrition Discharge Planning: Regular diet w/ po intake 75% EEN/ EPN + ONS as needed to maintain elevated Kcal/ Protein needs    Nutrition Risk Screen    Nutrition Risk Screen: no indicators present    Nutrition/Diet History    Patient Reported Diet/Restrictions/Preferences: general  Food Preferences: none  Do you have any cultural, spiritual, Taoist conflicts, given your current situation?: none noted   Food Allergies: NKFA  Factors Affecting Nutritional Intake: early satiety (inactivity )    Anthropometrics    Temp: 98 °F (36.7 °C)  Height Method: Stated  Height: 5' 3" (160 cm)  Height (inches): 63 in  Weight Method: Standard Scale  Weight: 78.5 kg (173 lb 1 oz)  Weight (lb): 173.06 lb  Ideal Body Weight (IBW), Female: 115 lb  % Ideal Body Weight, Female (lb): 150.49 lb  BMI (Calculated): 30.7  BMI Grade: 30 - 34.9- obesity - grade I       Lab/Procedures/Meds    Pertinent Labs Reviewed: reviewed  Pertinent Labs Comments: WBC-3.99, H/H-7.2/20.9, Plts-24, Alk Phos-804, Alb-1.8, Tbilli-1.1, AST-90  Pertinent " Medications Reviewed: reviewed  Pertinent Medications Comments: acyclovir, Ca, ,Cipro, Cytarabine, Dexamethasone, Duke's soln. Fentanyl, Neupogen, heparin, mg, pantoprazole, Vanc    Physical Findings/Assessment    Overall Physical Appearance: lethargic, overweight, weak  Tubes:  (PICC line access)  Oral/Mouth Cavity: WDL  Skin:  (Rash- Labia)    Estimated/Assessed Needs    Weight Used For Calorie Calculations: 71.7 kg (158 lb 1.1 oz) (+13.8 L fl)  Energy Calorie Requirements (kcal): 2151-2510kcal/d  Energy Need Method: Kcal/kg (30-35kcal/kg)  Protein Requirements: 108-144g/d (1.5-1.8g/kg)  Weight Used For Protein Calculations: 71.7 kg (158 lb 1.1 oz)  Fluid Requirements (mL): 1ml per KCal or Per MD  Fluid Need Method: RDA Method  RDA Method (mL): 2151  CHO Requirement: NA      Nutrition Prescription Ordered    Current Diet Order: Regular  Oral Nutrition Supplement: denied 2/2 taste    Evaluation of Received Nutrient/Fluid Intake    IV Fluid (mL): 2400  I/O: +13.8 L since admit  Energy Calories Required: not meeting needs  Protein Required: not meeting needs  Fluid Required: meeting needs  Comments: LBM: 6/15/18  % Intake of Estimated Energy Needs: 25 - 50 %  % Meal Intake: 25 - 50 %    Nutrition Risk    Level of Risk/Frequency of Follow-up: moderate     Assessment and Plan    * 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 Continue w/ Regular diet.   2. Encourage PO intake >/= 75% EEN/EPN   3. If PO intake is <50% encourage HVP w/ each meal, small frequent meal, snacks, ONS until nausea resolves  4. RD to follow    Nutrition Diagnosis Status:   Continues               Monitor and Evaluation    Food and Nutrient Intake: energy intake,  food and beverage intake  Food and Nutrient Administration: diet order  Physical Activity and Function: nutrition-related ADLs and IADLs  Anthropometric Measurements: weight, weight change  Biochemical Data, Medical Tests and Procedures:  (LBM: 5/31/18)  Nutrition-Focused Physical Findings: overall appearance     Nutrition Follow-Up    RD Follow-up?: Yes

## 2018-06-19 NOTE — CONSULTS
[facility]  Infectious Disease  Consult Note    Patient Name: Noreen Mac  MRN: [id number]  Admission Date: 5/24/2018  Hospital Length of Stay: 26 days  Attending Physician: Jacob Lugo MD  Primary Care Provider: Primary Doctor No     Isolation Status: No active isolations        Inpatient consult to Infectious Diseases  Consult performed by: RICARDO CROWDER  Consult ordered by: LOGAN OLSON  Reason for consult: bacteremia  Assessment/Recommendations: Consult received, full note and eval to follow

## 2018-06-19 NOTE — PLAN OF CARE
Problem: Patient Care Overview  Goal: Plan of Care Review  Outcome: Ongoing (interventions implemented as appropriate)  Plan of care reviewed with patient and family.  Has iv fluids infusing without difficulty.  Patient received magnesium x2 doses ivpb today for mag of 1.4.  Afebrile.  Also received calcium gluconate for calcium =6.2.  Ambulating, voiding and tolerating a regular diet without difficulty.  Complained of abdominal pain earlier today and received morphine 2mg iv.  Getting iv antibiotics and po antibiotics.  Getting chemo tonight fludarabine tonight. Patient receives neupogen and zofran 16mg prior to chemo.  Daughter at the bedside.

## 2018-06-19 NOTE — PROGRESS NOTES
[facility]  Hematology  Bone Marrow Transplant  Progress Note    Patient Name: Noreen Mac  Admission Date: 5/24/2018  Hospital Length of Stay: 26 days  Code Status: Full Code    Subjective:     Interval History:   Day 22 of 7+3 and Day 5 of FLAG JENNIFER. VSS, afebrile, ANC 3900. Vanc trough elevated and dose adjusted this am. Lower extremity edema improved after 40 of lasix yesterday, net negative 300 cc I&O. Mild nausea, no abdominal pain or diarrhea. Poor appetite but no difficulty eating or taking pills.      Objective:     Vital Signs (Most Recent):  Temp: 98.6 °F (37 °C) (06/19/18 0327)  Pulse: 69 (06/19/18 0327)  Resp: 20 (06/19/18 0327)  BP: (!) 117/59 (06/19/18 0327)  SpO2: 96 % (06/19/18 0327) Vital Signs (24h Range):  Temp:  [97.9 °F (36.6 °C)-98.6 °F (37 °C)] 98.6 °F (37 °C)  Pulse:  [59-69] 69  Resp:  [18-20] 20  SpO2:  [92 %-98 %] 96 %  BP: (117-132)/(58-63) 117/59     Weight: 78.5 kg (173 lb 1 oz)  Body mass index is 30.66 kg/m².  Body surface area is 1.87 meters squared.    Intake/Output - Last 3 Shifts       06/17 0700 - 06/18 0659 06/18 0700 - 06/19 0659 06/19 0700 - 06/20 0659    P.O. 560 1015     I.V. (mL/kg) 2393.3 (29.9) 2477.7 (31.6)     IV Piggyback 1000 650     Total Intake(mL/kg) 3953.3 (49.4) 4142.7 (52.8)     Urine (mL/kg/hr) 3400 (1.8) 4400 (2.3)     Total Output 3400 4400      Net +553.3 -257.3             Urine Occurrence 1 x 3 x     Stool Occurrence 1 x 1 x           Physical Exam   Constitutional: She is oriented to person, place, and time. She appears well-developed and well-nourished. No distress.   HENT:   Mouth/Throat: Oropharynx is clear and moist. No oropharyngeal exudate or posterior oropharyngeal erythema.   Eyes: Conjunctivae are normal. Pupils are equal, round, and reactive to light. No scleral icterus.   Neck: Normal range of motion. Neck supple. No thyromegaly present.   Cardiovascular: Normal rate, regular rhythm, normal heart sounds and intact  distal pulses.    Pulmonary/Chest: Effort normal and breath sounds normal. No respiratory distress.   Abdominal: Soft. Bowel sounds are normal. She exhibits no distension. There is no splenomegaly or hepatomegaly. There is no tenderness.   Musculoskeletal: Normal range of motion. She exhibits edema. She exhibits no tenderness.   +1 BLE   Lymphadenopathy:     She has no cervical adenopathy.     She has no axillary adenopathy.   Neurological: She is alert and oriented to person, place, and time. No cranial nerve deficit. Coordination normal.   Skin: No rash noted. No cyanosis. No pallor. Nails show no clubbing.   PICC intact R arm no redness or drainage   Psychiatric: She has a normal mood and affect. Thought content normal.   Vitals reviewed.      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 Ordered routine labs in addition to FLT3, NGS, and AML FISH.  - lo res HLA typing on 5/26/18 and hi res on 5/27/18 done in anticipation of possible need for future transplant   - Pt with two daughters, two full sisters (in their mid 60s, one with brain aneurysm hx, other one without any medical issues), and one full brother (61 y/o healthy).  - ECHO ordered 5/25/18, EF 65%  - PICC line placed 5/25/18   - initially on  hydrea, discontinued on 5/28.   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  - started on induction therapy on 5/29/18.  - Day 22 of 7+3.    - allopurinol stopped 6/11  - prophylactic antimicrobials: Acyclovir, Voriconazole  - will need CNS Prophylaxis given her elevated WBC (high risk) on admission after count recovery.  - NPM1 +, CEBPA (-), FLT3 (+).  On Midostaurin 50 mg PO BID until Day 14 (held starting 6/8 to 6/11). Will continue til day 21 (6/18) or until re-induction  - day 14 bone marrow biopsy completed 6/11 showing persistent disease with 15% CD 34 positive blasts  - Day 5 of FLAG-JENNIFER, tolerating without difficulty  - repeat 2D echo on 6/15 with preserved EF of 60%          Staphylococcus epidermidis bacteremia    - blood cultures from 6/11 at 0430 with gram + cocci, staph epidermis sensitive for Vancomycin, CXR and UA unremarkable.   - Continue vancomycin with projected end date of 6/27  - vanc trough 22.5 on 6/18, vanc dose held and restarted this am at 1250 mg BID  - surveillance blood cultures repeated 6/13 NGTD  - de-escalated aztreonam back to prophylactic cipro 6/16        Pancytopenia    - secondary to chemotherapy.   - WBC up to 3.99 and ANC 3900, on Neupogen per FLAG JENNIFER protocol  - hgb 7.2 and platelet count 24,000 today  - tranfuse if Hb < 7 g/dL and Plt < 10,000/mm3 or active bleed.           Abnormal liver enzymes    - baseline normal  - bilirubin improved and wnl at 1.1 today  - ALT 41 and AST 90, ALP elevated at 804  - holding midostaurin while patient gets induction chemo        Fluid overload    - lasix 20 mg IV on 6/17  - lasix 40 mg IV on 6/18  - down 6 kgs from yesterday and lower extremity edema much improved today        Hypocalcemia    - corrected calcium 8 today, calcium 6.2  - replace magnesium per protocol   - vitamin d level low so 5000 u of vitamin D daily started 6/17, will need repeat vitamin D level in 3 months  - continue calcium carbonate to 1000 mg tid,  will give 2 grams of calcium gluconate IV today        Hypophosphatemia    - phos 2.8 today  - prn replacements per electrolyte protocol        Hypomagnesemia    - mag oxide 800 mg bid  - prn replacement per protocol   - mag 1.4 today        MCTD (mixed connective tissue disease)    - MCTD-NOS  - previously on MTX and Prednisone.   - off therapy now and otherwise doing well.   - can follow up outpatient with her Rheumatologist upon discharge.         Pulmonary nodule    - CXR suggestive of 7 mm slightly irregular shaped radiodensity projected over the left upper lung zone which could represent a calcified granuloma but remains indeterminate.  - CT Chest on 5/28: Multiple scattered noncalcified pulmonary granulomas, the largest measuring 8 mm in the lateral segment of the right middle lobe.   - repeat non-contrast chest CT at 6-12 months         Insomnia    - PRN nightly Xanax.         HTN (hypertension)    - history of SVT and reports intermittent palpitations at times.  - EKG on 5/28 - NSR.  - increased metoprolol XL to 25 mg QD,she feels better with this dose.  - metoprolol tartrate changed on 6/13 to 25 mg bid (hold for SBP <100) due to hypotension  - BP improved        GERD (gastroesophageal reflux disease)    - takes Omeprazole at home, now on pepcid here  - Protonix added 6/13  - reflux improved         Osteoporosis    - takes prolia once every 6 months, last given mid April 2018   - no acute issue         Depression    - no acute issue   - continue home Escitalopram             VTE Risk Mitigation         Ordered     heparin, porcine (PF) 100 unit/mL injection flush 300 Units  As needed (PRN)      06/15/18 1657     Place MAYLIN hose  Until discontinued      05/25/18 0032     IP VTE HIGH RISK PATIENT  Once      05/25/18 0032          Disposition: pending completion of chemo, restaging marrow and count recovery    Sweetie Lepe, NP  Bone Marrow Transplant  [facility]

## 2018-06-19 NOTE — ASSESSMENT & PLAN NOTE
- corrected calcium 8 today, calcium 6.2  - replace magnesium per protocol   - vitamin d level low so 5000 u of vitamin D daily started 6/17, will need repeat vitamin D level in 3 months  - continue calcium carbonate to 1000 mg tid, will give 2 grams of calcium gluconate IV today

## 2018-06-19 NOTE — ASSESSMENT & PLAN NOTE
- lasix 20 mg IV on 6/17  - lasix 40 mg IV on 6/18  - down 6 kgs from yesterday and lower extremity edema much improved today

## 2018-06-19 NOTE — ASSESSMENT & PLAN NOTE
Nutrition Problem  increased nutrient needs    Related to (etiology):   Physiological needs 2/2 AML     Signs and Symptoms (as evidenced by):   Pt receiving 7+3 regimen (day+22 increased Caloric/ Protein needs)    Interventions/Recommendations (treatment strategy):  1. Continue w/ Regular diet.   2. Encourage PO intake >/= 75% EEN/EPN   3. If PO intake is <50% encourage HVP w/ each meal, small frequent meal, snacks, ONS until nausea resolves  4. RD to follow    Nutrition Diagnosis Status:   Continues

## 2018-06-19 NOTE — ASSESSMENT & PLAN NOTE
- baseline normal  - bilirubin improved and wnl at 1.1 today  - ALT 41 and AST 90, ALP elevated at 804  - holding midostaurin while patient gets induction chemo

## 2018-06-19 NOTE — ASSESSMENT & PLAN NOTE
- Admitted from [facility] on 5/25/18 early AM with WBC around 100s, for 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 Ordered routine labs in addition to FLT3, NGS, and AML FISH.  - lo res HLA typing on 5/26/18 and hi res on 5/27/18 done in anticipation of possible need for future transplant   - Pt with two daughters, two full sisters (in their mid 60s, one with brain aneurysm hx, other one without any medical issues), and one full brother (61 y/o healthy).  - ECHO ordered 5/25/18, EF 65%  - PICC line placed 5/25/18   - initially on hydrea, discontinued on 5/28.   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  - started on induction therapy on 5/29/18.  - Day 22 of 7+3.    - allopurinol stopped 6/11  - prophylactic antimicrobials: Acyclovir, Voriconazole  - will need CNS Prophylaxis given her elevated WBC (high risk) on admission after count recovery.  - NPM1 +, CEBPA (-), FLT3 (+).  On Midostaurin 50 mg PO BID until Day 14 (held starting 6/8 to 6/11). Will continue til day 21 (6/18) or until re-induction  - day 14 bone marrow biopsy completed 6/11 showing persistent disease with 15% CD 34 positive blasts  - Day 5 of FLAG-JENNIFER, tolerating without difficulty  - repeat 2D echo on 6/15 with preserved EF of 60%

## 2018-06-19 NOTE — ASSESSMENT & PLAN NOTE
- blood cultures from 6/11 at 0430 with gram + cocci, staph epidermis sensitive for Vancomycin, CXR and UA unremarkable.   - Continue vancomycin with projected end date of 6/27  - vanc trough 22.5 on 6/18, vanc dose held and restarted this am at 1250 mg BID  - surveillance blood cultures repeated 6/13 NGTD  - de-escalated aztreonam back to prophylactic cipro 6/16

## 2018-06-19 NOTE — ASSESSMENT & PLAN NOTE
- secondary to chemotherapy.   - WBC up to 3.99 and ANC 3900, on Neupogen per FLAG JENNIFER protocol  - hgb 7.2 and platelet count 24,000 today  - tranfuse if Hb < 7 g/dL and Plt < 10,000/mm3 or active bleed.

## 2018-06-20 PROBLEM — R11.0 CHEMOTHERAPY-INDUCED NAUSEA: Status: ACTIVE | Noted: 2018-06-20

## 2018-06-20 PROBLEM — T45.1X5A CHEMOTHERAPY-INDUCED NAUSEA: Status: ACTIVE | Noted: 2018-06-20

## 2018-06-20 LAB
ALBUMIN SERPL BCP-MCNC: 2 G/DL
ALP SERPL-CCNC: 804 U/L
ALT SERPL W/O P-5'-P-CCNC: 40 U/L
ANION GAP SERPL CALC-SCNC: 4 MMOL/L
ANISOCYTOSIS BLD QL SMEAR: SLIGHT
AST SERPL-CCNC: 78 U/L
BASOPHILS # BLD AUTO: ABNORMAL K/UL
BASOPHILS NFR BLD: 0 %
BILIRUB SERPL-MCNC: 1.1 MG/DL
BLD PROD TYP BPU: NORMAL
BLOOD UNIT EXPIRATION DATE: NORMAL
BLOOD UNIT TYPE CODE: 6200
BLOOD UNIT TYPE: NORMAL
BODY SITE - BONE MARROW: NORMAL
BODY SITE - BONE MARROW: NORMAL
BUN SERPL-MCNC: 10 MG/DL
CA-I BLDV-SCNC: 1.02 MMOL/L
CALCIUM SERPL-MCNC: 6.7 MG/DL
CHLORIDE SERPL-SCNC: 109 MMOL/L
CLINICAL DIAGNOSIS - BONE MARROW: NORMAL
CLINICAL DIAGNOSIS - BONE MARROW: NORMAL
CO2 SERPL-SCNC: 24 MMOL/L
CODING SYSTEM: NORMAL
CREAT SERPL-MCNC: 0.6 MG/DL
DIFFERENTIAL METHOD: ABNORMAL
DISPENSE STATUS: NORMAL
EOSINOPHIL # BLD AUTO: ABNORMAL K/UL
EOSINOPHIL NFR BLD: 0 %
ERYTHROCYTE [DISTWIDTH] IN BLOOD BY AUTOMATED COUNT: 16.7 %
EST. GFR  (AFRICAN AMERICAN): >60 ML/MIN/1.73 M^2
EST. GFR  (NON AFRICAN AMERICAN): >60 ML/MIN/1.73 M^2
FLOW CYTOMETRY ANTIBODIES ANALYZED - BONE MARROW: NORMAL
FLOW CYTOMETRY ANTIBODIES ANALYZED - BONE MARROW: NORMAL
FLOW CYTOMETRY COMMENT - BONE MARROW: NORMAL
FLOW CYTOMETRY COMMENT - BONE MARROW: NORMAL
FLOW CYTOMETRY INTERPRETATION - BONE MARROW: NORMAL
FLOW CYTOMETRY INTERPRETATION - BONE MARROW: NORMAL
GLUCOSE SERPL-MCNC: 98 MG/DL
HCT VFR BLD AUTO: 19.4 %
HGB BLD-MCNC: 6.5 G/DL
HYPOCHROMIA BLD QL SMEAR: ABNORMAL
IMM GRANULOCYTES # BLD AUTO: ABNORMAL K/UL
IMM GRANULOCYTES NFR BLD AUTO: ABNORMAL %
LYMPHOCYTES # BLD AUTO: ABNORMAL K/UL
LYMPHOCYTES NFR BLD: 10 %
MAGNESIUM SERPL-MCNC: 1.4 MG/DL
MAGNESIUM SERPL-MCNC: 2.2 MG/DL
MCH RBC QN AUTO: 30.5 PG
MCHC RBC AUTO-ENTMCNC: 33.5 G/DL
MCV RBC AUTO: 91 FL
MONOCYTES # BLD AUTO: ABNORMAL K/UL
MONOCYTES NFR BLD: 0 %
NEUTROPHILS NFR BLD: 90 %
NRBC BLD-RTO: 0 /100 WBC
NUM UNITS TRANS PACKED RBC: NORMAL
OVALOCYTES BLD QL SMEAR: ABNORMAL
PHOSPHATE SERPL-MCNC: 2.7 MG/DL
PHOSPHATE SERPL-MCNC: 3 MG/DL
PLATELET # BLD AUTO: 17 K/UL
PMV BLD AUTO: ABNORMAL FL
POIKILOCYTOSIS BLD QL SMEAR: SLIGHT
POLYCHROMASIA BLD QL SMEAR: ABNORMAL
POTASSIUM SERPL-SCNC: 3.6 MMOL/L
PROT SERPL-MCNC: 4.2 G/DL
RBC # BLD AUTO: 2.13 M/UL
SODIUM SERPL-SCNC: 137 MMOL/L
VANCOMYCIN TROUGH SERPL-MCNC: 18.9 UG/ML
WBC # BLD AUTO: 1.68 K/UL

## 2018-06-20 PROCEDURE — P9040 RBC LEUKOREDUCED IRRADIATED: HCPCS

## 2018-06-20 PROCEDURE — 25000003 PHARM REV CODE 250: Performed by: INTERNAL MEDICINE

## 2018-06-20 PROCEDURE — S0030 INJECTION, METRONIDAZOLE: HCPCS | Performed by: NURSE PRACTITIONER

## 2018-06-20 PROCEDURE — 36593 DECLOT VASCULAR DEVICE: CPT

## 2018-06-20 PROCEDURE — 63600175 PHARM REV CODE 636 W HCPCS: Performed by: INTERNAL MEDICINE

## 2018-06-20 PROCEDURE — 63600175 PHARM REV CODE 636 W HCPCS: Performed by: STUDENT IN AN ORGANIZED HEALTH CARE EDUCATION/TRAINING PROGRAM

## 2018-06-20 PROCEDURE — 25000003 PHARM REV CODE 250: Performed by: STUDENT IN AN ORGANIZED HEALTH CARE EDUCATION/TRAINING PROGRAM

## 2018-06-20 PROCEDURE — 63600175 PHARM REV CODE 636 W HCPCS: Performed by: NURSE PRACTITIONER

## 2018-06-20 PROCEDURE — 99233 SBSQ HOSP IP/OBS HIGH 50: CPT | Mod: ,,, | Performed by: INTERNAL MEDICINE

## 2018-06-20 PROCEDURE — 85007 BL SMEAR W/DIFF WBC COUNT: CPT

## 2018-06-20 PROCEDURE — 82330 ASSAY OF CALCIUM: CPT

## 2018-06-20 PROCEDURE — 25000003 PHARM REV CODE 250: Performed by: NURSE PRACTITIONER

## 2018-06-20 PROCEDURE — 85027 COMPLETE CBC AUTOMATED: CPT

## 2018-06-20 PROCEDURE — 20600001 HC STEP DOWN PRIVATE ROOM

## 2018-06-20 PROCEDURE — 80202 ASSAY OF VANCOMYCIN: CPT

## 2018-06-20 PROCEDURE — 63600175 PHARM REV CODE 636 W HCPCS: Mod: JG | Performed by: INTERNAL MEDICINE

## 2018-06-20 PROCEDURE — 80053 COMPREHEN METABOLIC PANEL: CPT

## 2018-06-20 PROCEDURE — 83735 ASSAY OF MAGNESIUM: CPT

## 2018-06-20 PROCEDURE — S0028 INJECTION, FAMOTIDINE, 20 MG: HCPCS | Performed by: NURSE PRACTITIONER

## 2018-06-20 PROCEDURE — 84100 ASSAY OF PHOSPHORUS: CPT

## 2018-06-20 PROCEDURE — A4216 STERILE WATER/SALINE, 10 ML: HCPCS | Performed by: INTERNAL MEDICINE

## 2018-06-20 PROCEDURE — 36430 TRANSFUSION BLD/BLD COMPNT: CPT

## 2018-06-20 RX ORDER — CIPROFLOXACIN 2 MG/ML
400 INJECTION, SOLUTION INTRAVENOUS
Status: DISCONTINUED | OUTPATIENT
Start: 2018-06-20 | End: 2018-07-03

## 2018-06-20 RX ORDER — FAMOTIDINE 10 MG/ML
20 INJECTION INTRAVENOUS 2 TIMES DAILY
Status: DISCONTINUED | OUTPATIENT
Start: 2018-06-20 | End: 2018-06-22

## 2018-06-20 RX ORDER — METRONIDAZOLE 500 MG/100ML
500 INJECTION, SOLUTION INTRAVENOUS
Status: COMPLETED | OUTPATIENT
Start: 2018-06-20 | End: 2018-06-24

## 2018-06-20 RX ORDER — ONDANSETRON 8 MG/1
16 TABLET, ORALLY DISINTEGRATING ORAL
Status: COMPLETED | OUTPATIENT
Start: 2018-06-20 | End: 2018-06-22

## 2018-06-20 RX ORDER — HYDROCODONE BITARTRATE AND ACETAMINOPHEN 500; 5 MG/1; MG/1
TABLET ORAL
Status: DISCONTINUED | OUTPATIENT
Start: 2018-06-20 | End: 2018-06-21

## 2018-06-20 RX ORDER — OLANZAPINE 5 MG/1
5 TABLET, ORALLY DISINTEGRATING ORAL DAILY
Status: DISCONTINUED | OUTPATIENT
Start: 2018-06-20 | End: 2018-06-22

## 2018-06-20 RX ADMIN — CIPROFLOXACIN 400 MG: 2 INJECTION, SOLUTION INTRAVENOUS at 09:06

## 2018-06-20 RX ADMIN — VORICONAZOLE 200 MG: 200 TABLET, FILM COATED ORAL at 08:06

## 2018-06-20 RX ADMIN — CALCIUM CARBONATE (ANTACID) CHEW TAB 500 MG 1000 MG: 500 CHEW TAB at 08:06

## 2018-06-20 RX ADMIN — ALTEPLASE 2 MG: 2.2 INJECTION, POWDER, LYOPHILIZED, FOR SOLUTION INTRAVENOUS at 04:06

## 2018-06-20 RX ADMIN — METRONIDAZOLE 500 MG: 500 INJECTION, SOLUTION INTRAVENOUS at 01:06

## 2018-06-20 RX ADMIN — CYTARABINE 3640 MG: 100 INJECTION, SOLUTION INTRATHECAL; INTRAVENOUS; SUBCUTANEOUS at 12:06

## 2018-06-20 RX ADMIN — ESCITALOPRAM 10 MG: 5 TABLET, FILM COATED ORAL at 08:06

## 2018-06-20 RX ADMIN — Medication 10 ML: at 12:06

## 2018-06-20 RX ADMIN — FILGRASTIM 480 MCG: 480 INJECTION, SOLUTION INTRAVENOUS; SUBCUTANEOUS at 06:06

## 2018-06-20 RX ADMIN — ACYCLOVIR SODIUM 200 MG: 50 INJECTION, SOLUTION INTRAVENOUS at 08:06

## 2018-06-20 RX ADMIN — POTASSIUM CHLORIDE 20 MEQ: 750 CAPSULE, EXTENDED RELEASE ORAL at 06:06

## 2018-06-20 RX ADMIN — DEXAMETHASONE 1 DROP: 1 SUSPENSION OPHTHALMIC at 05:06

## 2018-06-20 RX ADMIN — DEXAMETHASONE 1 DROP: 1 SUSPENSION OPHTHALMIC at 11:06

## 2018-06-20 RX ADMIN — METRONIDAZOLE 500 MG: 500 TABLET ORAL at 05:06

## 2018-06-20 RX ADMIN — Medication 800 MG: at 08:06

## 2018-06-20 RX ADMIN — METRONIDAZOLE 500 MG: 500 INJECTION, SOLUTION INTRAVENOUS at 10:06

## 2018-06-20 RX ADMIN — MAGNESIUM SULFATE IN WATER 2 G: 40 INJECTION, SOLUTION INTRAVENOUS at 12:06

## 2018-06-20 RX ADMIN — Medication 10 ML: at 05:06

## 2018-06-20 RX ADMIN — Medication 10 ML: at 11:06

## 2018-06-20 RX ADMIN — METOPROLOL TARTRATE 25 MG: 25 TABLET, FILM COATED ORAL at 09:06

## 2018-06-20 RX ADMIN — Medication 1250 MG: at 08:06

## 2018-06-20 RX ADMIN — FLUDARABINE PHOSPHATE 55 MG: 25 INJECTION, SOLUTION INTRAVENOUS at 09:06

## 2018-06-20 RX ADMIN — ONDANSETRON 8 MG: 2 INJECTION INTRAMUSCULAR; INTRAVENOUS at 05:06

## 2018-06-20 RX ADMIN — MAGNESIUM SULFATE IN WATER 2 G: 40 INJECTION, SOLUTION INTRAVENOUS at 10:06

## 2018-06-20 RX ADMIN — PROMETHAZINE HYDROCHLORIDE 12.5 MG: 25 INJECTION INTRAMUSCULAR; INTRAVENOUS at 12:06

## 2018-06-20 RX ADMIN — VITAMIN D, TAB 1000IU (100/BT) 5000 UNITS: 25 TAB at 08:06

## 2018-06-20 RX ADMIN — FAMOTIDINE 20 MG: 20 TABLET ORAL at 08:06

## 2018-06-20 RX ADMIN — LORAZEPAM 0.5 MG: 2 INJECTION INTRAMUSCULAR; INTRAVENOUS at 09:06

## 2018-06-20 RX ADMIN — PANTOPRAZOLE SODIUM 40 MG: 40 TABLET, DELAYED RELEASE ORAL at 08:06

## 2018-06-20 RX ADMIN — LORAZEPAM 0.5 MG: 2 INJECTION INTRAMUSCULAR; INTRAVENOUS at 08:06

## 2018-06-20 RX ADMIN — OLANZAPINE 5 MG: 5 TABLET, ORALLY DISINTEGRATING ORAL at 09:06

## 2018-06-20 RX ADMIN — METOPROLOL TARTRATE 25 MG: 25 TABLET, FILM COATED ORAL at 08:06

## 2018-06-20 RX ADMIN — MAGNESIUM SULFATE IN WATER 2 G: 40 INJECTION, SOLUTION INTRAVENOUS at 08:06

## 2018-06-20 RX ADMIN — PROCHLORPERAZINE MALEATE 10 MG: 5 TABLET, FILM COATED ORAL at 08:06

## 2018-06-20 RX ADMIN — ACYCLOVIR 400 MG: 200 CAPSULE ORAL at 08:06

## 2018-06-20 RX ADMIN — CIPROFLOXACIN HYDROCHLORIDE 500 MG: 500 TABLET, FILM COATED ORAL at 08:06

## 2018-06-20 RX ADMIN — CALCIUM CHLORIDE 1 G: 100 INJECTION INTRAVENOUS; INTRAVENTRICULAR at 10:06

## 2018-06-20 RX ADMIN — CALCIUM CARBONATE (ANTACID) CHEW TAB 500 MG 1000 MG: 500 CHEW TAB at 03:06

## 2018-06-20 RX ADMIN — FAMOTIDINE 20 MG: 10 INJECTION, SOLUTION INTRAVENOUS at 08:06

## 2018-06-20 RX ADMIN — DEXTROSE 310 MG: 5 SOLUTION INTRAVENOUS at 08:06

## 2018-06-20 RX ADMIN — ONDANSETRON 8 MG: 2 INJECTION INTRAMUSCULAR; INTRAVENOUS at 06:06

## 2018-06-20 RX ADMIN — ONDANSETRON 16 MG: 8 TABLET, ORALLY DISINTEGRATING ORAL at 08:06

## 2018-06-20 NOTE — PLAN OF CARE
Problem: Patient Care Overview  Goal: Plan of Care Review  Outcome: Ongoing (interventions implemented as appropriate)  Fall, pressure ulcer, and infection precautions continued. Vital signs stable. Nausea managed with PRN and scheduled medication. Medications changed to IV if possible due to nausea and vomiting. One unit of PRBCs given as ordered. Electrolytes replaced as ordered. Magnesium and Phosphorus levels draw this afternoon. Patient stable, will continue to monitor.

## 2018-06-20 NOTE — PROGRESS NOTES
[facility]  Hematology  Bone Marrow Transplant  Progress Note    Patient Name: Noreen Mac  Admission Date: 5/24/2018  Hospital Length of Stay: 27 days  Code Status: Full Code    Subjective:     Interval History:   Day 23 of 7+3 and Day 6 of FLAG JENNIFER. Patient complains of nausea and vomiting overnight and this am, especially when taking pills. Will add Zyprexa daily in addition to Zofran, compazine, and ativan PRN and will change meds to IV. 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 Afebrile.  No diarrhea, mouth sores or pain.    Objective:     Vital Signs (Most Recent):  Temp: 98.3 °F (36.8 °C) (06/20/18 1100)  Pulse: 68 (06/20/18 1100)  Resp: 18 (06/20/18 1100)  BP: 134/63 (06/20/18 1100)  SpO2: 97 % (06/20/18 1100) Vital Signs (24h Range):  Temp:  [97.7 °F (36.5 °C)-98.6 °F (37 °C)] 98.3 °F (36.8 °C)  Pulse:  [57-70] 68  Resp:  [16-20] 18  SpO2:  [93 %-98 %] 97 %  BP: (106-144)/(53-65) 134/63     Weight: 76.4 kg (168 lb 5.1 oz)  Body mass index is 29.82 kg/m².  Body surface area is 1.84 meters squared.      Intake/Output - Last 3 Shifts       06/18 0700 - 06/19 0659 06/19 0700 - 06/20 0659 06/20 0700 - 06/21 0659    P.O. 1015 1020     I.V. (mL/kg) 2477.7 (31.6) 335 (4.4)     Blood   350    IV Piggyback 650 850     Total Intake(mL/kg) 4142.7 (52.8) 2205 (28.9) 350 (4.6)    Urine (mL/kg/hr) 4400 (2.3) 4250 (2.3) 500 (1.4)    Total Output 4400 4250 500    Net -257.3 -2045 -150           Urine Occurrence 3 x 4 x     Stool Occurrence 1 x 1 x           Physical Exam   Constitutional: She is oriented to person, place, and time. She appears well-developed and well-nourished. No distress.   HENT:   Mouth/Throat: Oropharynx is clear and moist. No oropharyngeal exudate or posterior oropharyngeal erythema.   Eyes: Conjunctivae are normal. Pupils are equal, round, and reactive to light. No scleral icterus.   Neck: Normal range of  motion. Neck supple. No thyromegaly present.   Cardiovascular: Normal rate, regular rhythm, normal heart sounds and intact distal pulses.    Pulmonary/Chest: Effort normal and breath sounds normal. No respiratory distress.   Abdominal: Soft. Bowel sounds are normal. She exhibits no distension. There is no splenomegaly or hepatomegaly. There is no tenderness.   Musculoskeletal: Normal range of motion. She exhibits edema. She exhibits no tenderness.   +1 BLE   Lymphadenopathy:     She has no cervical adenopathy.     She has no axillary adenopathy.   Neurological: She is alert and oriented to person, place, and time. No cranial nerve deficit. Coordination normal.   Skin: No rash noted. No cyanosis. No pallor. Nails show no clubbing.   PICC intact R arm no redness or drainage   Psychiatric: She has a normal mood and affect. Thought content normal.   Vitals reviewed.      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 Ordered routine labs in addition to FLT3, NGS, and AML FISH.  - lo res HLA typing on 5/26/18 and hi res on 5/27/18 done in anticipation of possible need for future transplant   - Pt with two daughters, two full sisters (in their mid 60s, one with brain aneurysm hx, other one without any medical  issues), and one full brother (59 y/o healthy).  - ECHO ordered 5/25/18, EF 65%  - PICC line placed 5/25/18   - initially on hydrea, discontinued on 5/28.   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  - started on induction therapy on 5/29/18.  - Day 23 of 7+3.    - allopurinol stopped 6/11  - prophylactic antimicrobials: Acyclovir, Voriconazole  - will need CNS Prophylaxis given her elevated WBC (high risk) on admission after count recovery.  - NPM1 +, CEBPA (-), FLT3 (+).  On Midostaurin 50 mg PO BID until Day 14 (held starting 6/8 to 6/11). Stopped when FLAG JENNIFER re-induction started. Will restart at 25 mg bid on day 8 of FLAG JENNIFER induction (6/22)  - day 14 bone marrow biopsy completed 6/11 showing persistent disease with 15% CD 34 positive blasts  - Day 6 of FLAG-JENNIFER, tolerating without difficulty except nausea or vomiting  - repeat 2D echo on 6/15 with preserved EF of 60%          Staphylococcus epidermidis bacteremia    - blood cultures from 6/11 at 0430 with gram + cocci, staph epidermis sensitive for Vancomycin, CXR and UA unremarkable.   - Continue vancomycin with projected end date of 6/27  - Vanc at 1250 mg BID  - surveillance blood cultures repeated 6/13 NGTD  - de-escalated aztreonam back to prophylactic cipro 6/16  - ID consulted on 6/19, rec continue Vanc for 2 weeks post last negative culture (EOT 6/27)  - BC from 6/11 with gram negative rods, leptotrichia goodfellowii  - started Flagyl po x 5 days  - ID has signed off        Pancytopenia    - secondary to chemotherapy.   - WBC up to 1.68 and ANC 1500, on Neupogen per FLAG JENNIFER protocol  - hgb 6.5 and platelet count 17,000 today  - tranfuse if Hb < 7 g/dL and Plt < 10,000/mm3 or active bleed.   - will transfuse 1 unit of PRBC today          Abnormal liver enzymes    - baseline normal  - bilirubin improved and stable at 1.1 today  - ALT 40 and AST 78, ALP elevated and stable at 804  - holding midostaurin while patient gets induction  chemo, will restart at day 8 at 25 mg bid        Chemotherapy-induced nausea    - continue PRN Zofran, Compazine and Ativan  - start Zyprexa po  - change meds to IV if possible as patient vomits when taking pills        Fluid overload    - lasix 20 mg IV on 6/17  - lasix 40 mg IV on 6/18  - improved        Hypocalcemia    - corrected calcium 8.3 today, calcium 6.7  - magnesium wnl today   - vitamin d level low so 5000 u of vitamin D daily started 6/17, will need repeat vitamin D level in 3 months  - continue calcium carbonate to 1000 mg tid, will give 1 grams of calcium chloride IV today        Hypophosphatemia    - phos 3.0 today  - prn replacements per electrolyte protocol        Hypomagnesemia    - mag oxide 800 mg bid  - prn replacement per protocol   - mag 2.2 today        MCTD (mixed connective tissue disease)    - MCTD-NOS  - previously on MTX and Prednisone.   - off therapy now and otherwise doing well.   - can follow up outpatient with her Rheumatologist upon discharge.         Pulmonary nodule    - CXR suggestive of 7 mm slightly irregular shaped radiodensity projected over the left upper lung zone which could represent a calcified granuloma but remains indeterminate.  - CT Chest on 5/28: Multiple scattered noncalcified pulmonary granulomas, the largest measuring 8 mm in the lateral segment of the right middle lobe.   - repeat non-contrast chest CT at 6-12 months         Insomnia    - PRN nightly Xanax.         HTN (hypertension)    - history of SVT and reports intermittent palpitations at times.  - EKG on 5/28 - NSR.  - increased metoprolol XL to 25 mg QD,she feels better with this dose.  - metoprolol tartrate changed on 6/13 to 25 mg bid (hold for SBP <100) due to hypotension  - BP improved        GERD (gastroesophageal reflux disease)    - takes Omeprazole at home, now on pepcid here  - Protonix added 6/13  - reflux improved         Osteoporosis    - takes prolia once every 6 months, last given mid  April 2018   - no acute issue         Depression    - no acute issue   - continue home Escitalopram             VTE Risk Mitigation         Ordered     heparin, porcine (PF) 100 unit/mL injection flush 300 Units  As needed (PRN)      06/15/18 1657     Place MAYLIN hose  Until discontinued      05/25/18 0032     IP VTE HIGH RISK PATIENT  Once      05/25/18 0032          Disposition: pending completion of chemo, restaging and count recovery    Sweetie Lepe NP  Bone Marrow Transplant  [facility]

## 2018-06-20 NOTE — CONSULTS
[facility]  Infectious Disease  Consult Note    Patient Name: Noreen Mac  MRN: [id number]  Admission Date: 5/24/2018  Hospital Length of Stay: 26 days  Attending Physician: Jacob Lugo MD  Primary Care Provider: Primary Doctor No     Isolation Status: No active isolations    Patient information was obtained from patient and ER records.      Consults  Assessment/Plan:     Staphylococcus epidermidis bacteremia    69 y/o F admitted 5/25/18 with leukocytosis found to have non-M3 AML s/p induction 7+3 5/29/18 with expected neutropenia with day 14 marrow with persistent disease s/p FLAG-JENNIFER 6/15.  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  She is afebrile and c/o mild abdominal pain today. Her WBC are rising.  Repeat bcx are negative.  Pt has been treated with cefepime which was thought to be contributing to AMS then changed to aztreonam and back to ciprofloxacin prophylaxis.    - continue vancomycin for total 2 week course  - leptotrichia is anaerobic GNR likely from GI translocation.  With myeloid recovery and antimicrobials patient has cleared this organism from blood culture and is clinically well.    - would recommend 5 day further course of metronidazole  - if patient discharged on IV vanco she should follow up with me in clinic, with weekly labs and vanco trough sent to me  - discussed with team, will sign off for now please call back if questions            Thank you for your consult. I will sign off. Please contact us if you have any additional questions.    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  She is afebrile and c/o mild abdominal pain today. Her WBC are rising.  Repeat bcx are negative    Past Medical History:   Diagnosis Date    Hypertension        Past Surgical History:   Procedure Laterality Date    CHOLECYSTECTOMY      HYSTERECTOMY      TONSILLECTOMY         Review of patient's allergies indicates:   Allergen Reactions    Methotrexate analogues      Elevated Liver Enzyme    Bactrim [sulfamethoxazole-trimethoprim] Nausea And Vomiting and Rash       Medications:  Prescriptions Prior to Admission   Medication Sig    escitalopram oxalate (LEXAPRO) 10 MG tablet Take 10 mg by mouth once daily.    metoprolol succinate (TOPROL-XL) 25 MG 24 hr tablet Take 25 mg by mouth once daily.    omeprazole (PRILOSEC OTC) 20 MG tablet Take 20 mg by mouth every morning.     Antibiotics     Start     Stop Route Frequency Ordered    06/19/18 2200  metroNIDAZOLE tablet 500 mg      06/24 2159 Oral Every 8 hours 06/19/18 1537    06/19/18 0800  vancomycin (VANCOCIN) 1,250 mg in sodium chloride 0.9% 250 mL IVPB  (Vancomycin IVPB with levels panel)      -- IV Every 12 hours (non-standard times) 06/19/18 0555    06/17/18 0900  ciprofloxacin HCl tablet 500 mg      -- Oral Every 12 hours 06/17/18 0625        Antifungals     Start     Stop Route Frequency Ordered    06/08/18 0900  duke's soln (benadryl 30 mL, mylanta 30 mL, lidocane 30 mL, nystatin 30 mL) 120 mL      -- Oral 4 times daily 06/08/18 0508    06/05/18 0533  miconazole NITRATE 2 % top powder      -- Top 2 times daily PRN 06/05/18 0434    06/02/18 0900  voriconazole tablet 200 mg      -- Oral 2 times daily 05/29/18 1418        Antivirals         Stop Route Frequency     acyclovir      -- Oral 2 times daily             There is no immunization history on file for this patient.    Family History     Problem Relation (Age  of Onset)    Cancer Mother, Father        Social History     Social History    Marital status:      Spouse name: N/A    Number of children: N/A    Years of education: N/A     Social History Main Topics    Smoking status: Former Smoker     Packs/day: 0.50     Years: 10.00     Types: Cigarettes     Quit date: 3/25/2017    Smokeless tobacco: None    Alcohol use 0.6 oz/week     1 Shots of liquor per week    Drug use: No    Sexual activity: Yes     Partners: Female     Other Topics Concern    None     Social History Narrative    None     Review of Systems   Constitutional: Negative for activity change, chills and fever.   HENT: Negative for congestion, mouth sores, rhinorrhea, sinus pressure and sore throat.    Eyes: Negative for photophobia, pain and redness.   Respiratory: Negative for cough, chest tightness, shortness of breath and wheezing.    Cardiovascular: Negative for chest pain and leg swelling.   Gastrointestinal: Positive for abdominal pain. Negative for abdominal distention, diarrhea, nausea and vomiting.   Endocrine: Negative for polyuria.   Genitourinary: Negative for decreased urine volume, dysuria and flank pain.   Musculoskeletal: Negative for joint swelling and neck pain.   Skin: Negative for color change.   Allergic/Immunologic: Negative for food allergies.   Neurological: Negative for dizziness, weakness and headaches.   Hematological: Negative for adenopathy.   Psychiatric/Behavioral: Negative for agitation and confusion. The patient is not nervous/anxious.      Objective:     Vital Signs (Most Recent):  Temp: 98 °F (36.7 °C) (06/19/18 1533)  Pulse: (!) 57 (06/19/18 1533)  Resp: 19 (06/19/18 1533)  BP: 133/62 (06/19/18 1533)  SpO2: 95 % (06/19/18 1533) Vital Signs (24h Range):  Temp:  [96.5 °F (35.8 °C)-98.6 °F (37 °C)] 98 °F (36.7 °C)  Pulse:  [57-69] 57  Resp:  [18-20] 19  SpO2:  [92 %-96 %] 95 %  BP: (117-133)/(58-72) 133/62     Weight: 78.5 kg (173 lb 1 oz)  Body mass index is  30.66 kg/m².    Estimated Creatinine Clearance: 90.2 mL/min (based on SCr of 0.6 mg/dL).    Physical Exam   Constitutional: She is oriented to person, place, and time. She appears well-developed and well-nourished.   HENT:   Head: Normocephalic and atraumatic.   Eyes: Pupils are equal, round, and reactive to light.   Neck: Normal range of motion. Neck supple.   Cardiovascular: Normal rate.    Pulmonary/Chest: Effort normal and breath sounds normal.   Abdominal: Soft. Bowel sounds are normal.   Musculoskeletal: She exhibits no edema or tenderness.   Neurological: She is alert and oriented to person, place, and time.   Skin: Skin is warm and dry.   Psychiatric: She has a normal mood and affect.       Significant Labs:   CBC:   Recent Labs  Lab 06/18/18  0400 06/19/18  0400   WBC 2.08* 3.99   HGB 7.3* 7.2*   HCT 21.2* 20.9*   PLT 24* 24*     CMP:   Recent Labs  Lab 06/18/18  0400 06/19/18  0400    139   K 4.3 3.9   * 110   CO2 21* 24   GLU 99 95   BUN 16 17   CREATININE 0.5 0.6   CALCIUM 6.6* 6.2*   PROT 4.2* 4.0*   ALBUMIN 1.7* 1.8*   BILITOT 0.8 1.1*   ALKPHOS 779* 804*   AST 45* 90*   ALT 24 41   ANIONGAP 7* 5*   EGFRNONAA >60.0 >60.0       Significant Imaging: I have reviewed all pertinent imaging results/findings within the past 24 hours.     bcx staph epi, anaerobic GNR

## 2018-06-20 NOTE — SUBJECTIVE & OBJECTIVE
Subjective:     Interval History:   Day 23 of 7+3 and Day 6 of FLAG JENNIFER. Patient complains of nausea and vomiting overnight and this am, especially when taking pills. Will add Zyprexa daily in addition to Zofran, compazine, and ativan PRN and will change meds to IV. 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 Afebrile.  No diarrhea, mouth sores or pain.    Objective:     Vital Signs (Most Recent):  Temp: 98.3 °F (36.8 °C) (06/20/18 1100)  Pulse: 68 (06/20/18 1100)  Resp: 18 (06/20/18 1100)  BP: 134/63 (06/20/18 1100)  SpO2: 97 % (06/20/18 1100) Vital Signs (24h Range):  Temp:  [97.7 °F (36.5 °C)-98.6 °F (37 °C)] 98.3 °F (36.8 °C)  Pulse:  [57-70] 68  Resp:  [16-20] 18  SpO2:  [93 %-98 %] 97 %  BP: (106-144)/(53-65) 134/63     Weight: 76.4 kg (168 lb 5.1 oz)  Body mass index is 29.82 kg/m².  Body surface area is 1.84 meters squared.      Intake/Output - Last 3 Shifts       06/18 0700 - 06/19 0659 06/19 0700 - 06/20 0659 06/20 0700 - 06/21 0659    P.O. 1015 1020     I.V. (mL/kg) 2477.7 (31.6) 335 (4.4)     Blood   350    IV Piggyback 650 850     Total Intake(mL/kg) 4142.7 (52.8) 2205 (28.9) 350 (4.6)    Urine (mL/kg/hr) 4400 (2.3) 4250 (2.3) 500 (1.4)    Total Output 4400 4250 500    Net -257.3 -2045 -150           Urine Occurrence 3 x 4 x     Stool Occurrence 1 x 1 x           Physical Exam   Constitutional: She is oriented to person, place, and time. She appears well-developed and well-nourished. No distress.   HENT:   Mouth/Throat: Oropharynx is clear and moist. No oropharyngeal exudate or posterior oropharyngeal erythema.   Eyes: Conjunctivae are normal. Pupils are equal, round, and reactive to light. No scleral icterus.   Neck: Normal range of motion. Neck supple. No thyromegaly present.   Cardiovascular: Normal rate, regular rhythm, normal heart sounds and intact distal pulses.    Pulmonary/Chest: Effort normal and breath sounds normal. No  respiratory distress.   Abdominal: Soft. Bowel sounds are normal. She exhibits no distension. There is no splenomegaly or hepatomegaly. There is no tenderness.   Musculoskeletal: Normal range of motion. She exhibits edema. She exhibits no tenderness.   +1 BLE   Lymphadenopathy:     She has no cervical adenopathy.     She has no axillary adenopathy.   Neurological: She is alert and oriented to person, place, and time. No cranial nerve deficit. Coordination normal.   Skin: No rash noted. No cyanosis. No pallor. Nails show no clubbing.   PICC intact R arm no redness or drainage   Psychiatric: She has a normal mood and affect. Thought content normal.   Vitals reviewed.      Significant Labs:   CBC:   Recent Labs  Lab 06/19/18  0400 06/20/18  0522   WBC 3.99 1.68*   HGB 7.2* 6.5*   HCT 20.9* 19.4*   PLT 24* 17*    and CMP:   Recent Labs  Lab 06/19/18  0400 06/20/18  0522    137   K 3.9 3.6    109   CO2 24 24   GLU 95 98   BUN 17 10   CREATININE 0.6 0.6   CALCIUM 6.2* 6.7*   PROT 4.0* 4.2*   ALBUMIN 1.8* 2.0*   BILITOT 1.1* 1.1*   ALKPHOS 804* 804*   AST 90* 78*   ALT 41 40   ANIONGAP 5* 4*   EGFRNONAA >60.0 >60.0       Diagnostic Results:  None

## 2018-06-20 NOTE — PLAN OF CARE
Problem: Patient Care Overview  Goal: Plan of Care Review  Outcome: Ongoing (interventions implemented as appropriate)  Pt afebrile, pt with nausea and vomiting. Zofran, compazine, ativan and phenergen given. Pt with emesis after taking oral medications. phenergan relieved nausea. Pt continued on Day 5 of chemotherapy. Fludarabine and cytarabine given as ordered. IVF continued. Mag 1.1- 4gm magnesium given and phos 1.6- 20mmol Sodium phos given x 1. Instructed patient to call for assistance, bed low and locked, commode at bedside, call bell within reach, nonskid socks on, patient verbalized understanding.

## 2018-06-20 NOTE — ASSESSMENT & PLAN NOTE
- secondary to chemotherapy.   - WBC up to 1.68 and ANC 1500, on Neupogen per FLAG JENNIFER protocol  - hgb 6.5 and platelet count 17,000 today  - tranfuse if Hb < 7 g/dL and Plt < 10,000/mm3 or active bleed.   - will transfuse 1 unit of PRBC today

## 2018-06-20 NOTE — ASSESSMENT & PLAN NOTE
- corrected calcium 8.3 today, calcium 6.7  - magnesium wnl today   - vitamin d level low so 5000 u of vitamin D daily started 6/17, will need repeat vitamin D level in 3 months  - continue calcium carbonate to 1000 mg tid, will give 1 grams of calcium chloride IV today

## 2018-06-20 NOTE — ASSESSMENT & PLAN NOTE
- baseline normal  - bilirubin improved and stable at 1.1 today  - ALT 40 and AST 78, ALP elevated and stable at 804  - holding midostaurin while patient gets induction chemo, will restart at day 8 at 25 mg bid

## 2018-06-20 NOTE — PT/OT/SLP PROGRESS
Occupational Therapy      Patient Name:  Noreen Mac   MRN:  [id number]    Patient seen today for f/u, pt with nausea at this time and declines OOB, pt deferred another attempt by OT later and reports that she is ambulating to BR and still able to perform ADL's, reports ambulating with her daughter as often as able, and reports mobility and ADL status remains the same. Educated pt to contact nsg/MD if any change in status warranting increased therapy f/u. Will follow-up again next week.     Aimee Joiner OT  6/20/2018

## 2018-06-20 NOTE — ASSESSMENT & PLAN NOTE
- blood cultures from 6/11 at 0430 with gram + cocci, staph epidermis sensitive for Vancomycin, CXR and UA unremarkable.   - Continue vancomycin with projected end date of 6/27  - Vanc at 1250 mg BID  - surveillance blood cultures repeated 6/13 NGTD  - de-escalated aztreonam back to prophylactic cipro 6/16  - ID consulted on 6/19, rec continue Vanc for 2 weeks post last negative culture (EOT 6/27)  - BC from 6/11 with gram negative rods, leptotrichia goodfellowii  - started Flagyl po x 5 days  - ID has signed off

## 2018-06-20 NOTE — ASSESSMENT & PLAN NOTE
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 Ordered routine labs in addition to FLT3, NGS, and AML FISH.  - lo res HLA typing on 5/26/18 and hi res on 5/27/18 done in anticipation of possible need for future transplant   - Pt with two daughters, two full sisters (in their mid 60s, one with brain aneurysm hx, other one without any medical issues), and one full brother (59 y/o healthy).  - ECHO ordered 5/25/18, EF 65%  - PICC line placed 5/25/18   - initially on hydrea, discontinued on 5/28.   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  - started on induction therapy on 5/29/18.  - Day 23 of 7+3.    - allopurinol stopped 6/11  - prophylactic antimicrobials: Acyclovir, Voriconazole  - will need CNS Prophylaxis given her elevated WBC (high risk) on admission after count recovery.  - NPM1 +, CEBPA (-), FLT3 (+).  On Midostaurin 50 mg PO BID until Day 14 (held starting 6/8 to 6/11). Stopped when FLAG JENNIFER re-induction started. Will restart at 25 mg bid on day 8 of FLAG JENNIFER induction (6/22)  - day 14 bone marrow biopsy completed 6/11 showing persistent disease with 15% CD 34 positive blasts  - Day 6 of FLAG-JENNIFER, tolerating without difficulty except nausea or vomiting  - repeat 2D echo on 6/15 with preserved EF of 60%

## 2018-06-20 NOTE — SUBJECTIVE & OBJECTIVE
Past Medical History:   Diagnosis Date    Hypertension        Past Surgical History:   Procedure Laterality Date    CHOLECYSTECTOMY      HYSTERECTOMY      TONSILLECTOMY         Review of patient's allergies indicates:   Allergen Reactions    Methotrexate analogues      Elevated Liver Enzyme    Bactrim [sulfamethoxazole-trimethoprim] Nausea And Vomiting and Rash       Medications:  Prescriptions Prior to Admission   Medication Sig    escitalopram oxalate (LEXAPRO) 10 MG tablet Take 10 mg by mouth once daily.    metoprolol succinate (TOPROL-XL) 25 MG 24 hr tablet Take 25 mg by mouth once daily.    omeprazole (PRILOSEC OTC) 20 MG tablet Take 20 mg by mouth every morning.     Antibiotics     Start     Stop Route Frequency Ordered    06/19/18 2200  metroNIDAZOLE tablet 500 mg      06/24 2159 Oral Every 8 hours 06/19/18 1537    06/19/18 0800  vancomycin (VANCOCIN) 1,250 mg in sodium chloride 0.9% 250 mL IVPB  (Vancomycin IVPB with levels panel)      -- IV Every 12 hours (non-standard times) 06/19/18 0555    06/17/18 0900  ciprofloxacin HCl tablet 500 mg      -- Oral Every 12 hours 06/17/18 0625        Antifungals     Start     Stop Route Frequency Ordered    06/08/18 0900  duke's soln (benadryl 30 mL, mylanta 30 mL, lidocane 30 mL, nystatin 30 mL) 120 mL      -- Oral 4 times daily 06/08/18 0508    06/05/18 0533  miconazole NITRATE 2 % top powder      -- Top 2 times daily PRN 06/05/18 0434    06/02/18 0900  voriconazole tablet 200 mg      -- Oral 2 times daily 05/29/18 1418        Antivirals         Stop Route Frequency     acyclovir      -- Oral 2 times daily             There is no immunization history on file for this patient.    Family History     Problem Relation (Age of Onset)    Cancer Mother, Father        Social History     Social History    Marital status:      Spouse name: N/A    Number of children: N/A    Years of education: N/A     Social History Main Topics    Smoking status: Former Smoker      Packs/day: 0.50     Years: 10.00     Types: Cigarettes     Quit date: 3/25/2017    Smokeless tobacco: None    Alcohol use 0.6 oz/week     1 Shots of liquor per week    Drug use: No    Sexual activity: Yes     Partners: Female     Other Topics Concern    None     Social History Narrative    None     Review of Systems   Constitutional: Negative for activity change, chills and fever.   HENT: Negative for congestion, mouth sores, rhinorrhea, sinus pressure and sore throat.    Eyes: Negative for photophobia, pain and redness.   Respiratory: Negative for cough, chest tightness, shortness of breath and wheezing.    Cardiovascular: Negative for chest pain and leg swelling.   Gastrointestinal: Positive for abdominal pain. Negative for abdominal distention, diarrhea, nausea and vomiting.   Endocrine: Negative for polyuria.   Genitourinary: Negative for decreased urine volume, dysuria and flank pain.   Musculoskeletal: Negative for joint swelling and neck pain.   Skin: Negative for color change.   Allergic/Immunologic: Negative for food allergies.   Neurological: Negative for dizziness, weakness and headaches.   Hematological: Negative for adenopathy.   Psychiatric/Behavioral: Negative for agitation and confusion. The patient is not nervous/anxious.      Objective:     Vital Signs (Most Recent):  Temp: 98 °F (36.7 °C) (06/19/18 1533)  Pulse: (!) 57 (06/19/18 1533)  Resp: 19 (06/19/18 1533)  BP: 133/62 (06/19/18 1533)  SpO2: 95 % (06/19/18 1533) Vital Signs (24h Range):  Temp:  [96.5 °F (35.8 °C)-98.6 °F (37 °C)] 98 °F (36.7 °C)  Pulse:  [57-69] 57  Resp:  [18-20] 19  SpO2:  [92 %-96 %] 95 %  BP: (117-133)/(58-72) 133/62     Weight: 78.5 kg (173 lb 1 oz)  Body mass index is 30.66 kg/m².    Estimated Creatinine Clearance: 90.2 mL/min (based on SCr of 0.6 mg/dL).    Physical Exam   Constitutional: She is oriented to person, place, and time. She appears well-developed and well-nourished.   HENT:   Head: Normocephalic and  atraumatic.   Eyes: Pupils are equal, round, and reactive to light.   Neck: Normal range of motion. Neck supple.   Cardiovascular: Normal rate.    Pulmonary/Chest: Effort normal and breath sounds normal.   Abdominal: Soft. Bowel sounds are normal.   Musculoskeletal: She exhibits no edema or tenderness.   Neurological: She is alert and oriented to person, place, and time.   Skin: Skin is warm and dry.   Psychiatric: She has a normal mood and affect.       Significant Labs:   CBC:   Recent Labs  Lab 06/18/18  0400 06/19/18  0400   WBC 2.08* 3.99   HGB 7.3* 7.2*   HCT 21.2* 20.9*   PLT 24* 24*     CMP:   Recent Labs  Lab 06/18/18  0400 06/19/18  0400    139   K 4.3 3.9   * 110   CO2 21* 24   GLU 99 95   BUN 16 17   CREATININE 0.5 0.6   CALCIUM 6.6* 6.2*   PROT 4.2* 4.0*   ALBUMIN 1.7* 1.8*   BILITOT 0.8 1.1*   ALKPHOS 779* 804*   AST 45* 90*   ALT 24 41   ANIONGAP 7* 5*   EGFRNONAA >60.0 >60.0       Significant Imaging: I have reviewed all pertinent imaging results/findings within the past 24 hours.     bcx staph epi, anaerobic GNR

## 2018-06-20 NOTE — ASSESSMENT & PLAN NOTE
- continue PRN Zofran, Compazine and Ativan  - start Zyprexa po  - change meds to IV if possible as patient vomits when taking pills

## 2018-06-20 NOTE — PLAN OF CARE
Problem: Occupational Therapy Goal  Goal: Occupational Therapy Goal  Goals to be met by: 6/18/18     Patient will demonstrate no loss of functional independence with ADLs by performing basic ADL's at mod I/I level:         Outcome: Ongoing (interventions implemented as appropriate)  con't x 1 more tx  Aimee Joiner, OTR

## 2018-06-20 NOTE — PROGRESS NOTES
SW met with pt at bedside to provide support, SW will continue to follow.    Radha Urias [facility]  Oncology Social Worker  Phone: [phone]

## 2018-06-20 NOTE — ASSESSMENT & PLAN NOTE
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  She is afebrile and c/o mild abdominal pain today. Her WBC are rising.  Repeat bcx are negative.  Pt has been treated with cefepime which was thought to be contributing to AMS then changed to aztreonam and back to ciprofloxacin prophylaxis.    - continue vancomycin for total 2 week course  - leptotrichia is anaerobic GNR likely from GI translocation.  With myeloid recovery and antimicrobials patient has cleared this organism from blood culture and is clinically well.    - would recommend 5 day further course of metronidazole  - if patient discharged on IV vanco she should follow up with me in clinic, with weekly labs and vanco trough sent to me  - discussed with team, will sign off for now please call back if questions

## 2018-06-20 NOTE — HPI
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  She is afebrile and c/o mild abdominal pain today. Her WBC are rising.  Repeat bcx are negative

## 2018-06-21 PROBLEM — T45.1X5A CINV (CHEMOTHERAPY-INDUCED NAUSEA AND VOMITING): Status: ACTIVE | Noted: 2018-06-21

## 2018-06-21 PROBLEM — D70.9 NEUTROPENIC FEVER: Status: ACTIVE | Noted: 2018-06-21

## 2018-06-21 PROBLEM — E83.39 HYPOPHOSPHATEMIA: Status: RESOLVED | Noted: 2018-06-08 | Resolved: 2018-06-21

## 2018-06-21 PROBLEM — R11.2 CINV (CHEMOTHERAPY-INDUCED NAUSEA AND VOMITING): Status: ACTIVE | Noted: 2018-06-21

## 2018-06-21 PROBLEM — R50.81 NEUTROPENIC FEVER: Status: ACTIVE | Noted: 2018-06-21

## 2018-06-21 PROBLEM — T45.1X5A CHEMOTHERAPY ADVERSE REACTION: Status: ACTIVE | Noted: 2018-06-21

## 2018-06-21 LAB
ABO + RH BLD: NORMAL
ALBUMIN SERPL BCP-MCNC: 2 G/DL
ALP SERPL-CCNC: 717 U/L
ALT SERPL W/O P-5'-P-CCNC: 31 U/L
ANION GAP SERPL CALC-SCNC: 5 MMOL/L
ANISOCYTOSIS BLD QL SMEAR: SLIGHT
AST SERPL-CCNC: 42 U/L
BASOPHILS # BLD AUTO: ABNORMAL K/UL
BASOPHILS NFR BLD: 0 %
BILIRUB SERPL-MCNC: 0.8 MG/DL
BLD GP AB SCN CELLS X3 SERPL QL: NORMAL
BUN SERPL-MCNC: 7 MG/DL
CA-I BLDV-SCNC: 0.99 MMOL/L
CALCIUM SERPL-MCNC: 7 MG/DL
CHLORIDE SERPL-SCNC: 111 MMOL/L
CO2 SERPL-SCNC: 24 MMOL/L
CREAT SERPL-MCNC: 0.5 MG/DL
DIFFERENTIAL METHOD: ABNORMAL
EOSINOPHIL # BLD AUTO: ABNORMAL K/UL
EOSINOPHIL NFR BLD: 0 %
ERYTHROCYTE [DISTWIDTH] IN BLOOD BY AUTOMATED COUNT: 16.2 %
EST. GFR  (AFRICAN AMERICAN): >60 ML/MIN/1.73 M^2
EST. GFR  (NON AFRICAN AMERICAN): >60 ML/MIN/1.73 M^2
GLUCOSE SERPL-MCNC: 87 MG/DL
HCT VFR BLD AUTO: 22.2 %
HGB BLD-MCNC: 7.6 G/DL
HYPOCHROMIA BLD QL SMEAR: ABNORMAL
IMM GRANULOCYTES # BLD AUTO: ABNORMAL K/UL
IMM GRANULOCYTES NFR BLD AUTO: ABNORMAL %
LYMPHOCYTES # BLD AUTO: ABNORMAL K/UL
LYMPHOCYTES NFR BLD: 0 %
MAGNESIUM SERPL-MCNC: 1.7 MG/DL
MAGNESIUM SERPL-MCNC: 1.8 MG/DL
MCH RBC QN AUTO: 31 PG
MCHC RBC AUTO-ENTMCNC: 34.2 G/DL
MCV RBC AUTO: 91 FL
MONOCYTES # BLD AUTO: ABNORMAL K/UL
MONOCYTES NFR BLD: 0 %
NEUTROPHILS NFR BLD: 100 %
NRBC BLD-RTO: 0 /100 WBC
OVALOCYTES BLD QL SMEAR: ABNORMAL
PHOSPHATE SERPL-MCNC: 2.2 MG/DL
PHOSPHATE SERPL-MCNC: 3.2 MG/DL
PLATELET # BLD AUTO: 11 K/UL
PMV BLD AUTO: ABNORMAL FL
POIKILOCYTOSIS BLD QL SMEAR: SLIGHT
POLYCHROMASIA BLD QL SMEAR: ABNORMAL
POTASSIUM SERPL-SCNC: 3.6 MMOL/L
PROT SERPL-MCNC: 4.5 G/DL
RBC # BLD AUTO: 2.45 M/UL
SODIUM SERPL-SCNC: 140 MMOL/L
WBC # BLD AUTO: 0.31 K/UL

## 2018-06-21 PROCEDURE — 85007 BL SMEAR W/DIFF WBC COUNT: CPT

## 2018-06-21 PROCEDURE — 25000003 PHARM REV CODE 250: Performed by: INTERNAL MEDICINE

## 2018-06-21 PROCEDURE — S0028 INJECTION, FAMOTIDINE, 20 MG: HCPCS | Performed by: NURSE PRACTITIONER

## 2018-06-21 PROCEDURE — 86920 COMPATIBILITY TEST SPIN: CPT

## 2018-06-21 PROCEDURE — 63600175 PHARM REV CODE 636 W HCPCS: Performed by: INTERNAL MEDICINE

## 2018-06-21 PROCEDURE — 85027 COMPLETE CBC AUTOMATED: CPT

## 2018-06-21 PROCEDURE — 25000003 PHARM REV CODE 250: Performed by: STUDENT IN AN ORGANIZED HEALTH CARE EDUCATION/TRAINING PROGRAM

## 2018-06-21 PROCEDURE — A4216 STERILE WATER/SALINE, 10 ML: HCPCS | Performed by: INTERNAL MEDICINE

## 2018-06-21 PROCEDURE — 99232 SBSQ HOSP IP/OBS MODERATE 35: CPT | Mod: GC,,, | Performed by: INTERNAL MEDICINE

## 2018-06-21 PROCEDURE — 20600001 HC STEP DOWN PRIVATE ROOM

## 2018-06-21 PROCEDURE — 80053 COMPREHEN METABOLIC PANEL: CPT

## 2018-06-21 PROCEDURE — 83735 ASSAY OF MAGNESIUM: CPT | Mod: 91

## 2018-06-21 PROCEDURE — 25000003 PHARM REV CODE 250: Performed by: NURSE PRACTITIONER

## 2018-06-21 PROCEDURE — 86850 RBC ANTIBODY SCREEN: CPT

## 2018-06-21 PROCEDURE — 36415 COLL VENOUS BLD VENIPUNCTURE: CPT

## 2018-06-21 PROCEDURE — S0030 INJECTION, METRONIDAZOLE: HCPCS | Performed by: NURSE PRACTITIONER

## 2018-06-21 PROCEDURE — 84100 ASSAY OF PHOSPHORUS: CPT | Mod: 91

## 2018-06-21 PROCEDURE — 82330 ASSAY OF CALCIUM: CPT

## 2018-06-21 PROCEDURE — 63600175 PHARM REV CODE 636 W HCPCS: Performed by: NURSE PRACTITIONER

## 2018-06-21 PROCEDURE — C9113 INJ PANTOPRAZOLE SODIUM, VIA: HCPCS | Performed by: NURSE PRACTITIONER

## 2018-06-21 RX ADMIN — ACYCLOVIR SODIUM 200 MG: 50 INJECTION, SOLUTION INTRAVENOUS at 08:06

## 2018-06-21 RX ADMIN — LORAZEPAM 0.5 MG: 2 INJECTION INTRAMUSCULAR; INTRAVENOUS at 03:06

## 2018-06-21 RX ADMIN — LORAZEPAM 0.5 MG: 2 INJECTION INTRAMUSCULAR; INTRAVENOUS at 08:06

## 2018-06-21 RX ADMIN — DEXAMETHASONE 1 DROP: 1 SUSPENSION OPHTHALMIC at 06:06

## 2018-06-21 RX ADMIN — LOPERAMIDE HYDROCHLORIDE 2 MG: 2 CAPSULE ORAL at 03:06

## 2018-06-21 RX ADMIN — FAMOTIDINE 20 MG: 10 INJECTION, SOLUTION INTRAVENOUS at 08:06

## 2018-06-21 RX ADMIN — DEXTROSE 310 MG: 5 SOLUTION INTRAVENOUS at 09:06

## 2018-06-21 RX ADMIN — Medication 800 MG: at 08:06

## 2018-06-21 RX ADMIN — SODIUM CHLORIDE: 0.9 INJECTION, SOLUTION INTRAVENOUS at 11:06

## 2018-06-21 RX ADMIN — OLANZAPINE 5 MG: 5 TABLET, ORALLY DISINTEGRATING ORAL at 08:06

## 2018-06-21 RX ADMIN — ESCITALOPRAM 10 MG: 5 TABLET, FILM COATED ORAL at 08:06

## 2018-06-21 RX ADMIN — DEXAMETHASONE 1 DROP: 1 SUSPENSION OPHTHALMIC at 11:06

## 2018-06-21 RX ADMIN — ALPRAZOLAM 0.25 MG: 0.25 TABLET ORAL at 04:06

## 2018-06-21 RX ADMIN — Medication 10 ML: at 06:06

## 2018-06-21 RX ADMIN — VITAMIN D, TAB 1000IU (100/BT) 5000 UNITS: 25 TAB at 08:06

## 2018-06-21 RX ADMIN — MAGNESIUM SULFATE IN WATER 2 G: 40 INJECTION, SOLUTION INTRAVENOUS at 07:06

## 2018-06-21 RX ADMIN — METOPROLOL TARTRATE 25 MG: 25 TABLET, FILM COATED ORAL at 08:06

## 2018-06-21 RX ADMIN — Medication 1250 MG: at 08:06

## 2018-06-21 RX ADMIN — DEXAMETHASONE 1 DROP: 1 SUSPENSION OPHTHALMIC at 12:06

## 2018-06-21 RX ADMIN — FILGRASTIM 480 MCG: 480 INJECTION, SOLUTION INTRAVENOUS; SUBCUTANEOUS at 06:06

## 2018-06-21 RX ADMIN — METRONIDAZOLE 500 MG: 500 INJECTION, SOLUTION INTRAVENOUS at 09:06

## 2018-06-21 RX ADMIN — CALCIUM CARBONATE (ANTACID) CHEW TAB 500 MG 1000 MG: 500 CHEW TAB at 08:06

## 2018-06-21 RX ADMIN — CYTARABINE 3640 MG: 100 INJECTION, SOLUTION INTRATHECAL; INTRAVENOUS; SUBCUTANEOUS at 12:06

## 2018-06-21 RX ADMIN — LOPERAMIDE HYDROCHLORIDE 2 MG: 2 CAPSULE ORAL at 09:06

## 2018-06-21 RX ADMIN — Medication 10 ML: at 12:06

## 2018-06-21 RX ADMIN — METRONIDAZOLE 500 MG: 500 INJECTION, SOLUTION INTRAVENOUS at 06:06

## 2018-06-21 RX ADMIN — ONDANSETRON 8 MG: 2 INJECTION INTRAMUSCULAR; INTRAVENOUS at 08:06

## 2018-06-21 RX ADMIN — POTASSIUM CHLORIDE 20 MEQ: 750 CAPSULE, EXTENDED RELEASE ORAL at 08:06

## 2018-06-21 RX ADMIN — SODIUM CHLORIDE: 0.9 INJECTION, SOLUTION INTRAVENOUS at 09:06

## 2018-06-21 RX ADMIN — ONDANSETRON 16 MG: 8 TABLET, ORALLY DISINTEGRATING ORAL at 08:06

## 2018-06-21 RX ADMIN — RAMELTEON 8 MG: 8 TABLET, FILM COATED ORAL at 11:06

## 2018-06-21 RX ADMIN — DEXTROSE 40 MG: 50 INJECTION, SOLUTION INTRAVENOUS at 08:06

## 2018-06-21 RX ADMIN — ALPRAZOLAM 0.25 MG: 0.25 TABLET ORAL at 11:06

## 2018-06-21 RX ADMIN — CIPROFLOXACIN 400 MG: 2 INJECTION, SOLUTION INTRAVENOUS at 08:06

## 2018-06-21 RX ADMIN — CIPROFLOXACIN 400 MG: 2 INJECTION, SOLUTION INTRAVENOUS at 09:06

## 2018-06-21 RX ADMIN — SODIUM PHOSPHATE, MONOBASIC, MONOHYDRATE 20.01 MMOL: 276; 142 INJECTION, SOLUTION INTRAVENOUS at 10:06

## 2018-06-21 RX ADMIN — Medication 1250 MG: at 07:06

## 2018-06-21 RX ADMIN — METRONIDAZOLE 500 MG: 500 INJECTION, SOLUTION INTRAVENOUS at 01:06

## 2018-06-21 NOTE — PLAN OF CARE
MDR's with Dr Lugo.  Patient is day 24of 7+3 and day 7 of FLAG-Maribel.  C/o fatigue.  Nausea improved.  IV Vanc continued through 6/27 for staph epi.  PT/OT following.  D/c pending next BMB and count recovery.  Will continue to follow.

## 2018-06-21 NOTE — SUBJECTIVE & OBJECTIVE
Subjective:     Interval History:   - no acute events overnight  - reports nausea is better controlled    Objective:     Vital Signs (Most Recent):  Temp: 97.6 °F (36.4 °C) (06/21/18 1517)  Pulse: (!) 56 (06/21/18 1517)  Resp: 19 (06/21/18 1517)  BP: (!) 147/65 (06/21/18 1517)  SpO2: 96 % (06/21/18 1517) Vital Signs (24h Range):  Temp:  [97.6 °F (36.4 °C)-98.8 °F (37.1 °C)] 97.6 °F (36.4 °C)  Pulse:  [56-64] 56  Resp:  [16-19] 19  SpO2:  [93 %-98 %] 96 %  BP: (136-159)/(64-67) 147/65     Weight: 74 kg (163 lb 2.3 oz)  Body mass index is 28.9 kg/m².  Body surface area is 1.81 meters squared.      Intake/Output - Last 3 Shifts       06/19 0700 - 06/20 0659 06/20 0700 - 06/21 0659 06/21 0700 - 06/22 0659    P.O. 1020 120 220    I.V. (mL/kg) 335 (4.4) 1251.7 (16.9)     Blood  350     IV Piggyback 850 1400 800    Total Intake(mL/kg) 2205 (28.9) 3121.7 (42.2) 1020 (13.8)    Urine (mL/kg/hr) 4250 (2.3) 5925 (3.3)     Total Output 4250 5925      Net -2045 -2803.3 +1020           Urine Occurrence 4 x  2 x    Stool Occurrence 1 x  1 x          Physical Exam   Constitutional: She is oriented to person, place, and time. She appears well-developed and well-nourished. No distress.   HENT:   Mouth/Throat: Oropharynx is clear and moist. No oropharyngeal exudate or posterior oropharyngeal erythema.   Eyes: Conjunctivae are normal. Pupils are equal, round, and reactive to light. No scleral icterus.   Neck: Normal range of motion. Neck supple. No thyromegaly present.   Cardiovascular: Normal rate, regular rhythm, normal heart sounds and intact distal pulses.    Pulmonary/Chest: Effort normal and breath sounds normal. No respiratory distress.   Abdominal: Soft. Bowel sounds are normal. She exhibits no distension. There is no splenomegaly or hepatomegaly. There is no tenderness.   Musculoskeletal: Normal range of motion. She exhibits no edema or tenderness.   Lymphadenopathy:     She has no cervical adenopathy.     She has no axillary  adenopathy.   Neurological: She is alert and oriented to person, place, and time. No cranial nerve deficit. Coordination normal.   Skin: No rash noted. No cyanosis. No pallor. Nails show no clubbing.   PICC intact R arm no redness or drainage   Psychiatric: She has a normal mood and affect. Thought content normal.   Vitals reviewed.      Significant Labs:   CBC:   Recent Labs  Lab 06/20/18  0522 06/21/18  0408   WBC 1.68* 0.31*   HGB 6.5* 7.6*   HCT 19.4* 22.2*   PLT 17* 11*    and CMP:   Recent Labs  Lab 06/20/18  0522 06/21/18  0408    140   K 3.6 3.6    111*   CO2 24 24   GLU 98 87   BUN 10 7*   CREATININE 0.6 0.5   CALCIUM 6.7* 7.0*   PROT 4.2* 4.5*   ALBUMIN 2.0* 2.0*   BILITOT 1.1* 0.8   ALKPHOS 804* 717*   AST 78* 42*   ALT 40 31   ANIONGAP 4* 5*   EGFRNONAA >60.0 >60.0       Diagnostic Results:  I have reviewed all pertinent imaging results/findings within the past 24 hours.

## 2018-06-21 NOTE — ASSESSMENT & PLAN NOTE
Corrected calcium 8.6 today.  - continue Calcium carbonate TID and Vitamin D 5,000 units daily for now  - if Ca normalizes can switch to lower dose Calcium-Vit D supplement

## 2018-06-21 NOTE — ASSESSMENT & PLAN NOTE
Pt reports nausea has improved with introduction of Zyprexa. She was able to keep down some solid food today.  - continue Zyprexa daily  - continue PRN Zofran, Compazine and Ativan

## 2018-06-21 NOTE — ASSESSMENT & PLAN NOTE
- Admitted from [facility] on 5/25/18 early AM with WBC around 100s, for 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 Ordered routine labs in addition to FLT3, NGS, and AML FISH.  - lo res HLA typing on 5/26/18 and hi res on 5/27/18 done in anticipation of possible need for future transplant   - Pt with two daughters, two full sisters (in their mid 60s, one with brain aneurysm hx, other one without any medical issues), and one full brother (61 y/o healthy).  - ECHO ordered 5/25/18, EF 65%  - PICC line placed 5/25/18   - initially on hydrea, discontinued on 5/28.   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  - started on induction therapy on 5/29/18.  - Day 23 of 7+3.    - allopurinol stopped 6/11  - prophylactic antimicrobials: Acyclovir, Voriconazole  - will need CNS Prophylaxis given her elevated WBC (high risk) on admission after count recovery.  - NPM1 +, CEBPA (-), FLT3 (+).  On Midostaurin 50 mg PO BID until Day 14 (held starting 6/8 to 6/11). Stopped when FLAG JENNIFER re-induction started. Will restart at 25 mg bid on day 8 of FLAG JENNIFER induction (6/22)  - day 14 bone marrow biopsy completed 6/11 showing persistent disease with 15% CD 34 positive blasts  - Day 7 of FLAG-JENNIFER, tolerating without difficulty except nausea or vomiting  - repeat 2D echo on 6/15 with preserved EF of 60%

## 2018-06-21 NOTE — ASSESSMENT & PLAN NOTE
AST and ALT have normalized. Alk phos is still elevated but coming down.  - holding midostaurin while patient gets re-induction chemotherapy, will start tomorrow

## 2018-06-21 NOTE — PROGRESS NOTES
[facility]  Hematology  Bone Marrow Transplant  Progress Note    Patient Name: Noreen Mac  Admission Date: 5/24/2018  Hospital Length of Stay: 28 days  Code Status: Full Code    Subjective:     Interval History:   - no acute events overnight  - reports nausea is better controlled    Objective:     Vital Signs (Most Recent):  Temp: 97.6 °F (36.4 °C) (06/21/18 1517)  Pulse: (!) 56 (06/21/18 1517)  Resp: 19 (06/21/18 1517)  BP: (!) 147/65 (06/21/18 1517)  SpO2: 96 % (06/21/18 1517) Vital Signs (24h Range):  Temp:  [97.6 °F (36.4 °C)-98.8 °F (37.1 °C)] 97.6 °F (36.4 °C)  Pulse:  [56-64] 56  Resp:  [16-19] 19  SpO2:  [93 %-98 %] 96 %  BP: (136-159)/(64-67) 147/65     Weight: 74 kg (163 lb 2.3 oz)  Body mass index is 28.9 kg/m².  Body surface area is 1.81 meters squared.      Intake/Output - Last 3 Shifts       06/19 0700 - 06/20 0659 06/20 0700 - 06/21 0659 06/21 0700 - 06/22 0659    P.O. 1020 120 220    I.V. (mL/kg) 335 (4.4) 1251.7 (16.9)     Blood  350     IV Piggyback 850 1400 800    Total Intake(mL/kg) 2205 (28.9) 3121.7 (42.2) 1020 (13.8)    Urine (mL/kg/hr) 4250 (2.3) 5925 (3.3)     Total Output 4250 5925      Net -2045 -2803.3 +1020           Urine Occurrence 4 x  2 x    Stool Occurrence 1 x  1 x          Physical Exam   Constitutional: She is oriented to person, place, and time. She appears well-developed and well-nourished. No distress.   HENT:   Mouth/Throat: Oropharynx is clear and moist. No oropharyngeal exudate or posterior oropharyngeal erythema.   Eyes: Conjunctivae are normal. Pupils are equal, round, and reactive to light. No scleral icterus.   Neck: Normal range of motion. Neck supple. No thyromegaly present.   Cardiovascular: Normal rate, regular rhythm, normal heart sounds and intact distal pulses.    Pulmonary/Chest: Effort normal and breath sounds normal. No respiratory distress.   Abdominal: Soft. Bowel sounds are normal. She exhibits no distension. There is no  splenomegaly or hepatomegaly. There is no tenderness.   Musculoskeletal: Normal range of motion. She exhibits no edema or tenderness.   Lymphadenopathy:     She has no cervical adenopathy.     She has no axillary adenopathy.   Neurological: She is alert and oriented to person, place, and time. No cranial nerve deficit. Coordination normal.   Skin: No rash noted. No cyanosis. No pallor. Nails show no clubbing.   PICC intact R arm no redness or drainage   Psychiatric: She has a normal mood and affect. Thought content normal.   Vitals reviewed.      Significant Labs:   CBC:   Recent Labs  Lab 06/20/18  0522 06/21/18  0408   WBC 1.68* 0.31*   HGB 6.5* 7.6*   HCT 19.4* 22.2*   PLT 17* 11*    and CMP:   Recent Labs  Lab 06/20/18  0522 06/21/18  0408    140   K 3.6 3.6    111*   CO2 24 24   GLU 98 87   BUN 10 7*   CREATININE 0.6 0.5   CALCIUM 6.7* 7.0*   PROT 4.2* 4.5*   ALBUMIN 2.0* 2.0*   BILITOT 1.1* 0.8   ALKPHOS 804* 717*   AST 78* 42*   ALT 40 31   ANIONGAP 4* 5*   EGFRNONAA >60.0 >60.0       Diagnostic Results:  I have reviewed all pertinent imaging results/findings within the past 24 hours.    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 Ordered routine labs in addition to FLT3, NGS, and AML FISH.  - lo res HLA typing on 5/26/18 and hi res on 5/27/18 done in anticipation of possible need for future transplant   - Pt with two daughters, two full sisters (in their mid 60s, one with brain aneurysm hx, other one without any medical issues), and one full brother (61 y/o healthy).  - ECHO ordered 5/25/18, EF 65%  - PICC line placed 5/25/18   - initially on hydrea, discontinued on 5/28.   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  - started on induction therapy on  5/29/18.  - Day 23 of 7+3.    - allopurinol stopped 6/11  - prophylactic antimicrobials: Acyclovir, Voriconazole  - will need CNS Prophylaxis given her elevated WBC (high risk) on admission after count recovery.  - NPM1 +, CEBPA (-), FLT3 (+).  On Midostaurin 50 mg PO BID until Day 14 (held starting 6/8 to 6/11). Stopped when FLAG JENNIFER re-induction started. Will restart at 25 mg bid on day 8 of FLAG JENNIFER induction (6/22)  - day 14 bone marrow biopsy completed 6/11 showing persistent disease with 15% CD 34 positive blasts  - Day 7 of FLAG-JENNIFER, tolerating without difficulty except nausea or vomiting  - repeat 2D echo on 6/15 with preserved EF of 60%          CINV (chemotherapy-induced nausea and vomiting)    Improved today with Olanzapine.  - continue daily olanzapine  - prn Zofran and Phenergan        Chemotherapy-induced nausea    Pt reports nausea has improved with introduction of Zyprexa. She was able to keep down some solid food today.  - continue Zyprexa daily  - continue PRN Zofran, Compazine and Ativan          Fluid overload    - lasix 20 mg IV on 6/17  - lasix 40 mg IV on 6/18  - improved        Hypocalcemia    Corrected calcium 8.6 today.  - continue Calcium carbonate TID and Vitamin D 5,000 units daily for now  - if Ca normalizes can switch to lower dose Calcium-Vit D supplement        Abnormal liver enzymes    AST and ALT have normalized. Alk phos is still elevated but coming down.  - holding midostaurin while patient gets re-induction chemotherapy, will start tomorrow         Hypomagnesemia    Mg 1.7 today.  - Mag oxide replacement per protocol        Pancytopenia    - secondary to chemotherapy.   - WBC up to 1.68 and ANC 1500, on Neupogen per FLAG JENNIFER protocol  - hgb 6.5 and platelet count 17,000 today  - tranfuse if Hb < 7 g/dL and Plt < 10,000/mm3 or active bleed.   - will transfuse 1 unit of PRBC today          MCTD (mixed connective tissue disease)    - MCTD-NOS  - previously on MTX and Prednisone.    - off therapy now and otherwise doing well.   - can follow up outpatient with her Rheumatologist upon discharge.         Staphylococcus epidermidis bacteremia    - blood cultures from 6/11 at 0430 with gram + cocci, staph epidermis sensitive for Vancomycin, CXR and UA unremarkable.   - Continue vancomycin with projected end date of 6/27  - Vanc at 1250 mg BID  - surveillance blood cultures repeated 6/13 NGTD  - de-escalated aztreonam back to prophylactic cipro 6/16  - ID consulted on 6/19, rec continue Vanc for 2 weeks post last negative culture (EOT 6/27)  - BC from 6/11 with gram negative rods, leptotrichia goodfellowii  - started Flagyl po x 5 days  - ID has signed off        Pulmonary nodule    - CXR suggestive of 7 mm slightly irregular shaped radiodensity projected over the left upper lung zone which could represent a calcified granuloma but remains indeterminate.  - CT Chest on 5/28: Multiple scattered noncalcified pulmonary granulomas, the largest measuring 8 mm in the lateral segment of the right middle lobe.   - repeat non-contrast chest CT at 6-12 months         Insomnia    - PRN nightly Xanax.         HTN (hypertension)    - history of SVT and reports intermittent palpitations at times.  - EKG on 5/28 - NSR.  - increased metoprolol XL to 25 mg QD,she feels better with this dose.  - metoprolol tartrate changed on 6/13 to 25 mg bid (hold for SBP <100) due to hypotension  - BP improved        GERD (gastroesophageal reflux disease)    - takes Omeprazole at home, now on pepcid here  - Protonix added 6/13  - reflux improved         Osteoporosis    - takes prolia once every 6 months, last given mid April 2018   - no acute issue         Depression    - no acute issue   - continue home Escitalopram             VTE Risk Mitigation         Ordered     heparin, porcine (PF) 100 unit/mL injection flush 300 Units  As needed (PRN)      06/15/18 1657     Place MAYLIN hose  Until discontinued      05/25/18 0032     IP VTE  HIGH RISK PATIENT  Once      05/25/18 0032          Disposition: Nausea improved today. Has completed Flag-JENNIFER re-induction. Monitoring counts daily and awaiting recovery.     Simon Luong MD  Bone Marrow Transplant  [facility]

## 2018-06-21 NOTE — PLAN OF CARE
"Problem: Patient Care Overview  Goal: Plan of Care Review  Outcome: Ongoing (interventions implemented as appropriate)  POC reviewed w Pt and daughter at start of shift. Remains afebrile. Nausea x1 overnight. Ativan and scheduled zofran given. Day 5 of chemotherapy. Fludarabine and cytarabine given as ordered. IVF continued. 2gm Mag riders x2 given for mag 1.4 . Xanax x1 dose for anxiety this a.m. Pt reports she "slept" last night.Instructed patient to call for assistance when needed, bed low and locked, commode at bedside, call light in reach, nonskid socks on,patient verbalized understanding. Daughter at bedside overnight. Will CTM.      "

## 2018-06-22 PROBLEM — E87.6 HYPOKALEMIA: Status: ACTIVE | Noted: 2018-06-22

## 2018-06-22 PROBLEM — E83.39 HYPOPHOSPHATEMIA: Status: ACTIVE | Noted: 2018-06-22

## 2018-06-22 PROBLEM — E87.70 FLUID OVERLOAD: Status: RESOLVED | Noted: 2018-06-17 | Resolved: 2018-06-22

## 2018-06-22 LAB
ALBUMIN SERPL BCP-MCNC: 1.9 G/DL
ALP SERPL-CCNC: 1024 U/L
ALT SERPL W/O P-5'-P-CCNC: 28 U/L
ANION GAP SERPL CALC-SCNC: 4 MMOL/L
AST SERPL-CCNC: 57 U/L
BASOPHILS # BLD AUTO: 0 K/UL
BASOPHILS NFR BLD: 0 %
BILIRUB SERPL-MCNC: 1 MG/DL
BLD PROD TYP BPU: NORMAL
BLOOD UNIT EXPIRATION DATE: NORMAL
BLOOD UNIT TYPE CODE: 6200
BLOOD UNIT TYPE: NORMAL
BUN SERPL-MCNC: 5 MG/DL
CA-I BLDV-SCNC: 1.03 MMOL/L
CALCIUM SERPL-MCNC: 6.5 MG/DL
CHLORIDE SERPL-SCNC: 112 MMOL/L
CO2 SERPL-SCNC: 24 MMOL/L
CODING SYSTEM: NORMAL
CREAT SERPL-MCNC: 0.6 MG/DL
DIFFERENTIAL METHOD: ABNORMAL
DISPENSE STATUS: NORMAL
EOSINOPHIL # BLD AUTO: 0 K/UL
EOSINOPHIL NFR BLD: 0 %
ERYTHROCYTE [DISTWIDTH] IN BLOOD BY AUTOMATED COUNT: 15.9 %
EST. GFR  (AFRICAN AMERICAN): >60 ML/MIN/1.73 M^2
EST. GFR  (NON AFRICAN AMERICAN): >60 ML/MIN/1.73 M^2
GGT SERPL-CCNC: 884 U/L
GLUCOSE SERPL-MCNC: 97 MG/DL
HCT VFR BLD AUTO: 21.2 %
HGB BLD-MCNC: 7 G/DL
IMM GRANULOCYTES # BLD AUTO: 0 K/UL
IMM GRANULOCYTES NFR BLD AUTO: 0 %
LYMPHOCYTES # BLD AUTO: 0 K/UL
LYMPHOCYTES NFR BLD: 0 %
MAGNESIUM SERPL-MCNC: 1.2 MG/DL
MAGNESIUM SERPL-MCNC: 1.5 MG/DL
MCH RBC QN AUTO: 30.3 PG
MCHC RBC AUTO-ENTMCNC: 33 G/DL
MCV RBC AUTO: 92 FL
MONOCYTES # BLD AUTO: 0 K/UL
MONOCYTES NFR BLD: 0 %
NEUTROPHILS # BLD AUTO: 0 K/UL
NEUTROPHILS NFR BLD: 100 %
NRBC BLD-RTO: 0 /100 WBC
NUM UNITS TRANS WBC-POOR PLATPHERESIS: NORMAL
PHOSPHATE SERPL-MCNC: 1.9 MG/DL
PHOSPHATE SERPL-MCNC: 2.9 MG/DL
PLATELET # BLD AUTO: 5 K/UL
PMV BLD AUTO: ABNORMAL FL
POTASSIUM SERPL-SCNC: 3.1 MMOL/L
PROT SERPL-MCNC: 4.3 G/DL
RBC # BLD AUTO: 2.31 M/UL
SODIUM SERPL-SCNC: 140 MMOL/L
VANCOMYCIN TROUGH SERPL-MCNC: 15.7 UG/ML
WBC # BLD AUTO: 0.02 K/UL

## 2018-06-22 PROCEDURE — 83735 ASSAY OF MAGNESIUM: CPT

## 2018-06-22 PROCEDURE — S0030 INJECTION, METRONIDAZOLE: HCPCS | Performed by: NURSE PRACTITIONER

## 2018-06-22 PROCEDURE — 25000003 PHARM REV CODE 250: Performed by: INTERNAL MEDICINE

## 2018-06-22 PROCEDURE — A4216 STERILE WATER/SALINE, 10 ML: HCPCS | Performed by: INTERNAL MEDICINE

## 2018-06-22 PROCEDURE — S0028 INJECTION, FAMOTIDINE, 20 MG: HCPCS | Performed by: NURSE PRACTITIONER

## 2018-06-22 PROCEDURE — 25000003 PHARM REV CODE 250: Performed by: NURSE PRACTITIONER

## 2018-06-22 PROCEDURE — C9113 INJ PANTOPRAZOLE SODIUM, VIA: HCPCS | Performed by: NURSE PRACTITIONER

## 2018-06-22 PROCEDURE — 63600175 PHARM REV CODE 636 W HCPCS: Mod: JG | Performed by: INTERNAL MEDICINE

## 2018-06-22 PROCEDURE — 36593 DECLOT VASCULAR DEVICE: CPT

## 2018-06-22 PROCEDURE — 20600001 HC STEP DOWN PRIVATE ROOM

## 2018-06-22 PROCEDURE — 63600175 PHARM REV CODE 636 W HCPCS: Performed by: INTERNAL MEDICINE

## 2018-06-22 PROCEDURE — 84100 ASSAY OF PHOSPHORUS: CPT | Mod: 91

## 2018-06-22 PROCEDURE — 80202 ASSAY OF VANCOMYCIN: CPT

## 2018-06-22 PROCEDURE — 85007 BL SMEAR W/DIFF WBC COUNT: CPT

## 2018-06-22 PROCEDURE — P9037 PLATE PHERES LEUKOREDU IRRAD: HCPCS

## 2018-06-22 PROCEDURE — 99233 SBSQ HOSP IP/OBS HIGH 50: CPT | Mod: ,,, | Performed by: INTERNAL MEDICINE

## 2018-06-22 PROCEDURE — 82977 ASSAY OF GGT: CPT

## 2018-06-22 PROCEDURE — 85027 COMPLETE CBC AUTOMATED: CPT

## 2018-06-22 PROCEDURE — 25000003 PHARM REV CODE 250: Performed by: STUDENT IN AN ORGANIZED HEALTH CARE EDUCATION/TRAINING PROGRAM

## 2018-06-22 PROCEDURE — 63600175 PHARM REV CODE 636 W HCPCS: Performed by: NURSE PRACTITIONER

## 2018-06-22 PROCEDURE — 80053 COMPREHEN METABOLIC PANEL: CPT

## 2018-06-22 PROCEDURE — 36430 TRANSFUSION BLD/BLD COMPNT: CPT

## 2018-06-22 PROCEDURE — 82330 ASSAY OF CALCIUM: CPT

## 2018-06-22 RX ORDER — OLANZAPINE 5 MG/1
5 TABLET, ORALLY DISINTEGRATING ORAL DAILY
Status: COMPLETED | OUTPATIENT
Start: 2018-06-23 | End: 2018-06-25

## 2018-06-22 RX ORDER — HYDROCODONE BITARTRATE AND ACETAMINOPHEN 500; 5 MG/1; MG/1
TABLET ORAL
Status: DISCONTINUED | OUTPATIENT
Start: 2018-06-22 | End: 2018-06-23

## 2018-06-22 RX ORDER — POTASSIUM CHLORIDE 14.9 MG/ML
20 INJECTION INTRAVENOUS
Status: COMPLETED | OUTPATIENT
Start: 2018-06-22 | End: 2018-06-22

## 2018-06-22 RX ADMIN — Medication 10 ML: at 06:06

## 2018-06-22 RX ADMIN — DEXAMETHASONE 1 DROP: 1 SUSPENSION OPHTHALMIC at 06:06

## 2018-06-22 RX ADMIN — METRONIDAZOLE 500 MG: 500 INJECTION, SOLUTION INTRAVENOUS at 09:06

## 2018-06-22 RX ADMIN — VITAMIN D, TAB 1000IU (100/BT) 5000 UNITS: 25 TAB at 09:06

## 2018-06-22 RX ADMIN — DEXAMETHASONE 1 DROP: 1 SUSPENSION OPHTHALMIC at 11:06

## 2018-06-22 RX ADMIN — SODIUM CHLORIDE: 900 INJECTION, SOLUTION INTRAVENOUS at 09:06

## 2018-06-22 RX ADMIN — CIPROFLOXACIN 400 MG: 2 INJECTION, SOLUTION INTRAVENOUS at 08:06

## 2018-06-22 RX ADMIN — MAGNESIUM SULFATE IN WATER 2 G: 40 INJECTION, SOLUTION INTRAVENOUS at 06:06

## 2018-06-22 RX ADMIN — DEXAMETHASONE 1 DROP: 1 SUSPENSION OPHTHALMIC at 05:06

## 2018-06-22 RX ADMIN — DEXTROSE 310 MG: 5 SOLUTION INTRAVENOUS at 09:06

## 2018-06-22 RX ADMIN — POTASSIUM CHLORIDE 20 MEQ: 750 CAPSULE, EXTENDED RELEASE ORAL at 09:06

## 2018-06-22 RX ADMIN — METRONIDAZOLE 500 MG: 500 INJECTION, SOLUTION INTRAVENOUS at 01:06

## 2018-06-22 RX ADMIN — OLANZAPINE 5 MG: 5 TABLET, ORALLY DISINTEGRATING ORAL at 09:06

## 2018-06-22 RX ADMIN — Medication 1250 MG: at 08:06

## 2018-06-22 RX ADMIN — Medication 10 ML: at 12:06

## 2018-06-22 RX ADMIN — METOPROLOL TARTRATE 25 MG: 25 TABLET, FILM COATED ORAL at 09:06

## 2018-06-22 RX ADMIN — ALPRAZOLAM 0.25 MG: 0.25 TABLET ORAL at 08:06

## 2018-06-22 RX ADMIN — DEXTROSE 40 MG: 50 INJECTION, SOLUTION INTRAVENOUS at 09:06

## 2018-06-22 RX ADMIN — ACYCLOVIR SODIUM 200 MG: 50 INJECTION, SOLUTION INTRAVENOUS at 08:06

## 2018-06-22 RX ADMIN — MAGNESIUM SULFATE IN WATER 2 G: 40 INJECTION, SOLUTION INTRAVENOUS at 08:06

## 2018-06-22 RX ADMIN — POTASSIUM CHLORIDE 20 MEQ: 200 INJECTION, SOLUTION INTRAVENOUS at 11:06

## 2018-06-22 RX ADMIN — ONDANSETRON 8 MG: 2 INJECTION INTRAMUSCULAR; INTRAVENOUS at 06:06

## 2018-06-22 RX ADMIN — FILGRASTIM 480 MCG: 480 INJECTION, SOLUTION INTRAVENOUS; SUBCUTANEOUS at 07:06

## 2018-06-22 RX ADMIN — POTASSIUM CHLORIDE 20 MEQ: 200 INJECTION, SOLUTION INTRAVENOUS at 03:06

## 2018-06-22 RX ADMIN — POTASSIUM CHLORIDE 20 MEQ: 200 INJECTION, SOLUTION INTRAVENOUS at 01:06

## 2018-06-22 RX ADMIN — SODIUM CHLORIDE: 0.9 INJECTION, SOLUTION INTRAVENOUS at 09:06

## 2018-06-22 RX ADMIN — FAMOTIDINE 20 MG: 10 INJECTION, SOLUTION INTRAVENOUS at 09:06

## 2018-06-22 RX ADMIN — ONDANSETRON 16 MG: 8 TABLET, ORALLY DISINTEGRATING ORAL at 08:06

## 2018-06-22 RX ADMIN — MAGNESIUM SULFATE IN WATER 2 G: 40 INJECTION, SOLUTION INTRAVENOUS at 09:06

## 2018-06-22 RX ADMIN — LORAZEPAM 0.5 MG: 2 INJECTION INTRAMUSCULAR; INTRAVENOUS at 08:06

## 2018-06-22 RX ADMIN — CALCIUM CHLORIDE 1 G: 100 INJECTION INTRAVENOUS; INTRAVENTRICULAR at 10:06

## 2018-06-22 RX ADMIN — ALPRAZOLAM 0.25 MG: 0.25 TABLET ORAL at 09:06

## 2018-06-22 RX ADMIN — ACYCLOVIR SODIUM 200 MG: 50 INJECTION, SOLUTION INTRAVENOUS at 09:06

## 2018-06-22 RX ADMIN — ESCITALOPRAM 10 MG: 5 TABLET, FILM COATED ORAL at 09:06

## 2018-06-22 RX ADMIN — SODIUM PHOSPHATE, MONOBASIC, MONOHYDRATE 20.01 MMOL: 276; 142 INJECTION, SOLUTION INTRAVENOUS at 10:06

## 2018-06-22 RX ADMIN — CIPROFLOXACIN 400 MG: 2 INJECTION, SOLUTION INTRAVENOUS at 09:06

## 2018-06-22 RX ADMIN — METRONIDAZOLE 500 MG: 500 INJECTION, SOLUTION INTRAVENOUS at 05:06

## 2018-06-22 RX ADMIN — ALTEPLASE 2 MG: 2.2 INJECTION, POWDER, LYOPHILIZED, FOR SOLUTION INTRAVENOUS at 05:06

## 2018-06-22 RX ADMIN — MAGNESIUM SULFATE IN WATER 2 G: 40 INJECTION, SOLUTION INTRAVENOUS at 11:06

## 2018-06-22 NOTE — PLAN OF CARE
Problem: Patient Care Overview  Goal: Plan of Care Review  Outcome: Ongoing (interventions implemented as appropriate)  Plan of care reviewed with the patient at the beginning of the shift. Pt admitted for newly diagnosed AML. She is s/p 7&3 and FLAG-JENNIFER. She has complaints of nausea and diarrhea. Emesis x1. Nausea managed with Zofran and Ativan. Small bland meals encouraged. No BM this shift. Pt using the BSC due to generalized weakness and fatigue. Fall precautions maintained. Daughter remained at the bedside and very attentive to care. Pt remained free from falls and injury this shift. Bed locked in lowest position, side rails up x2, call light within reach. Instructed pt to call for assistance as needed. Vitals stable. Pt afebrile. Neutropenic precautions maintained. No acute issues overnight. Will continue to monitor.

## 2018-06-22 NOTE — SUBJECTIVE & OBJECTIVE
Subjective:     Interval History:  Day 25 of 7+3 and Day 8 of FLAG JENNIFER. Nausea improved some with Zyprexa but did have 1 episode of emesis overnight.continuing meds IV due to vomiting. Remains afebrile. ANC 0. Continues Vanc and Flagyl. Electrolytes replaced. No c/o sob, pain or diarrhea.    Objective:     Vital Signs (Most Recent):  Temp: 98.2 °F (36.8 °C) (06/22/18 1123)  Pulse: 64 (06/22/18 0932)  Resp: 14 (06/22/18 0954)  BP: (!) 148/67 (06/22/18 0954)  SpO2: 96 % (06/22/18 0954) Vital Signs (24h Range):  Temp:  [97.6 °F (36.4 °C)-99.3 °F (37.4 °C)] 98.2 °F (36.8 °C)  Pulse:  [56-70] 64  Resp:  [14-20] 14  SpO2:  [96 %-98 %] 96 %  BP: (130-159)/(60-69) 148/67     Weight: 71.6 kg (157 lb 13.6 oz)  Body mass index is 27.96 kg/m².  Body surface area is 1.78 meters squared.      Intake/Output - Last 3 Shifts       06/20 0700 - 06/21 0659 06/21 0700 - 06/22 0659 06/22 0700 - 06/23 0659    P.O. 120 970 240    I.V. (mL/kg) 1251.7 (16.9) 1871.7 (26.1) 521.7 (7.3)    Blood 350  245    IV Piggyback 1400 1950 700    Total Intake(mL/kg) 3121.7 (42.2) 4791.7 (66.9) 1706.7 (23.8)    Urine (mL/kg/hr) 5925 (3.3) 6400 (3.7) 550 (1.7)    Total Output 5925 6400 550    Net -2803.3 -1608.3 +1156.7           Urine Occurrence  3 x     Stool Occurrence  1 x 0 x          Physical Exam   Constitutional: She is oriented to person, place, and time. She appears well-developed and well-nourished. No distress.   HENT:   Mouth/Throat: Oropharynx is clear and moist. No oropharyngeal exudate or posterior oropharyngeal erythema.   Eyes: Conjunctivae are normal. Pupils are equal, round, and reactive to light. No scleral icterus.   Neck: Normal range of motion. Neck supple. No thyromegaly present.   Cardiovascular: Normal rate, regular rhythm, normal heart sounds and intact distal pulses.    Pulmonary/Chest: Effort normal and breath sounds normal. No respiratory distress.   Abdominal: Soft. Bowel sounds are normal. She exhibits no distension.  There is no splenomegaly or hepatomegaly. There is no tenderness.   Musculoskeletal: Normal range of motion. She exhibits no edema or tenderness.   Neurological: She is alert and oriented to person, place, and time. No cranial nerve deficit. Coordination normal.   Skin: No rash noted. No cyanosis. No pallor. Nails show no clubbing.   PICC intact R arm no redness or drainage   Psychiatric: She has a normal mood and affect. Thought content normal.   Vitals reviewed.      Significant Labs:   CBC:   Recent Labs  Lab 06/21/18  0408 06/22/18  0451   WBC 0.31* 0.02*   HGB 7.6* 7.0*   HCT 22.2* 21.2*   PLT 11* 5*    and CMP:   Recent Labs  Lab 06/21/18  0408 06/22/18  0451    140   K 3.6 3.1*   * 112*   CO2 24 24   GLU 87 97   BUN 7* 5*   CREATININE 0.5 0.6   CALCIUM 7.0* 6.5*   PROT 4.5* 4.3*   ALBUMIN 2.0* 1.9*   BILITOT 0.8 1.0   ALKPHOS 717* 1,024*   AST 42* 57*   ALT 31 28   ANIONGAP 5* 4*   EGFRNONAA >60.0 >60.0       Diagnostic Results:  None

## 2018-06-22 NOTE — PLAN OF CARE
"Problem: Patient Care Overview  Goal: Plan of Care Review  Outcome: Ongoing (interventions implemented as appropriate)  Patient reports "I slept very well last night". Patient denies any discomfort at this time. Reports having poor appetite and nausea intermittently. No falls, afebrile, this shift. Patient with scattered bruising to skin. Patient schedule for US of the liver this evening. Patient remains on neutropenic, thrombocytopenic precautions. Will cont to monitor.        "

## 2018-06-22 NOTE — ASSESSMENT & PLAN NOTE
- blood cultures from 6/11 at 0430 with gram + cocci, staph epidermis sensitive for Vancomycin, CXR and UA unremarkable.   - Continue vancomycin with projected end date of 6/27  - Vanc at 1250 mg BID, vanc trough 18.9 on 6/20  - surveillance blood cultures repeated 6/13 NGTD  - de-escalated aztreonam back to prophylactic cipro 6/16  - ID consulted on 6/19, rec continue Vanc for 2 weeks post last negative culture (EOT 6/27)  - BC from 6/11 with gram negative rods, leptotrichia goodfellowii  - started Flagyl po x 5 days (EOT 6/24)  - ID has signed off

## 2018-06-22 NOTE — PROGRESS NOTES
[facility]  Hematology  Bone Marrow Transplant  Progress Note    Patient Name: Noreen Mac  Admission Date: 5/24/2018  Hospital Length of Stay: 29 days  Code Status: Full Code    Subjective:     Interval History:  Day 25 of 7+3 and Day 8 of FLAG JENNIFER. Nausea improved some with Zyprexa but did have 1 episode of emesis overnight.continuing meds IV due to vomiting. Remains afebrile. ANC 0. Continues Vanc and Flagyl. Electrolytes replaced. No c/o sob, pain or diarrhea.    Objective:     Vital Signs (Most Recent):  Temp: 98.2 °F (36.8 °C) (06/22/18 1123)  Pulse: 64 (06/22/18 0932)  Resp: 14 (06/22/18 0954)  BP: (!) 148/67 (06/22/18 0954)  SpO2: 96 % (06/22/18 0954) Vital Signs (24h Range):  Temp:  [97.6 °F (36.4 °C)-99.3 °F (37.4 °C)] 98.2 °F (36.8 °C)  Pulse:  [56-70] 64  Resp:  [14-20] 14  SpO2:  [96 %-98 %] 96 %  BP: (130-159)/(60-69) 148/67     Weight: 71.6 kg (157 lb 13.6 oz)  Body mass index is 27.96 kg/m².  Body surface area is 1.78 meters squared.      Intake/Output - Last 3 Shifts       06/20 0700 - 06/21 0659 06/21 0700 - 06/22 0659 06/22 0700 - 06/23 0659    P.O. 120 970 240    I.V. (mL/kg) 1251.7 (16.9) 1871.7 (26.1) 521.7 (7.3)    Blood 350  245    IV Piggyback 1400 1950 700    Total Intake(mL/kg) 3121.7 (42.2) 4791.7 (66.9) 1706.7 (23.8)    Urine (mL/kg/hr) 5925 (3.3) 6400 (3.7) 550 (1.7)    Total Output 5925 6400 550    Net -2803.3 -1608.3 +1156.7           Urine Occurrence  3 x     Stool Occurrence  1 x 0 x          Physical Exam   Constitutional: She is oriented to person, place, and time. She appears well-developed and well-nourished. No distress.   HENT:   Mouth/Throat: Oropharynx is clear and moist. No oropharyngeal exudate or posterior oropharyngeal erythema.   Eyes: Conjunctivae are normal. Pupils are equal, round, and reactive to light. No scleral icterus.   Neck: Normal range of motion. Neck supple. No thyromegaly present.   Cardiovascular: Normal rate, regular rhythm,  normal heart sounds and intact distal pulses.    Pulmonary/Chest: Effort normal and breath sounds normal. No respiratory distress.   Abdominal: Soft. Bowel sounds are normal. She exhibits no distension. There is no splenomegaly or hepatomegaly. There is no tenderness.   Musculoskeletal: Normal range of motion. She exhibits no edema or tenderness.   Neurological: She is alert and oriented to person, place, and time. No cranial nerve deficit. Coordination normal.   Skin: No rash noted. No cyanosis. No pallor. Nails show no clubbing.   PICC intact R arm no redness or drainage   Psychiatric: She has a normal mood and affect. Thought content normal.   Vitals reviewed.      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 Ordered routine labs in addition to FLT3, NGS, and AML FISH.  - lo res HLA typing on 5/26/18 and hi res on 5/27/18 done in anticipation of possible need for future transplant   - Pt with two daughters, two full sisters (in their mid 60s, one with brain aneurysm hx, other one without any medical issues), and one full brother (59 y/o healthy).  - ECHO ordered 5/25/18, EF 65%  - PICC line placed 5/25/18   - initially on hydrea, discontinued on 5/28.   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  - started on induction therapy on 5/29/18.  - Day 24 of 7+3.    - allopurinol stopped 6/11  - prophylactic antimicrobials: Acyclovir, Voriconazole  - will need CNS Prophylaxis given her elevated WBC (high risk) on admission after count recovery.  - NPM1 +, CEBPA (-), FLT3 (+).  On Midostaurin 50 mg PO BID until Day 14 (held starting 6/8 to 6/11). Stopped when FLAG JENNIFER re-induction started. RestartingMidostaurin at 25 mg bid on day 8 of FLAG JENNIFER induction (6/22)  - day 14 bone marrow biopsy completed 6/11 showing persistent disease with 15% CD 34 positive blasts  - Day 8 of FLAG-JENNIFER, tolerating without difficulty except nausea or vomiting  - repeat 2D echo on 6/15 with preserved EF of 60%  - plan for day 14 restaging bone marrow biopsy on 6/29          Staphylococcus epidermidis bacteremia    - blood cultures from 6/11 at 0430 with gram + cocci, staph epidermis sensitive for Vancomycin, CXR and UA unremarkable.   - Continue vancomycin with projected end date of 6/27  - Vanc at 1250 mg BID, vanc trough 18.9 on 6/20  - surveillance blood cultures repeated 6/13 NGTD  - de-escalated aztreonam back to prophylactic cipro 6/16  - ID consulted on 6/19, rec continue Vanc for 2 weeks post last negative culture (EOT 6/27)  - BC from 6/11 with gram negative rods, leptotrichia goodfellowii  - started Flagyl po x 5 days (EOT 6/24)  - ID has signed off        Pancytopenia    - secondary to chemotherapy.   - WBC 0.02 and ANC 0, on Neupogen per FLAG JENNIFER protocol  - hgb 7.0 and platelet count 5,000 today  - tranfuse if Hb < 7 g/dL and Plt < 10,000/mm3 or active bleed.   - will transfuse 1 unit of platelets today          Abnormal liver enzymes    AST and ALT have normalized. Alk phos is still elevated and is up to 1041 today.   - obtain GGT today  - midostaurin starting today at 25 mg bid, will monitor liver enzymes closely  - plan for possible liver ultrasound if enzymes continue to rise or GGT abnormal          Hypophosphatemia    -phos 1.9 today  -replace phos per protocol        Hypokalemia    - K 3.1 today  - replace with IV potassium 60 meq today (unable to tolerate po due to nausea)        CINV (chemotherapy-induced nausea and vomiting)    Improved today with Olanzapine.  - continue daily olanzapine  - prn Zofran and Phenergan        Chemotherapy-induced nausea    Pt reports nausea has improved with introduction of Zyprexa. She was able to keep down some solid food today.  - continue Zyprexa daily  - continue PRN Zofran, Compazine and Ativan  - meds IV if possible        Hypocalcemia    - Corrected calcium 8.2 today.  - will give calcium chloride 1 gram today  - continue Calcium carbonate TID and Vitamin D 5,000 units daily for now  - if Ca normalizes can switch to lower dose Calcium-Vit D supplement        Hypomagnesemia    Mg 1.2 today.  - Mag oxide replacement per protocol        MCTD (mixed connective tissue disease)    - MCTD-NOS  - previously on MTX and Prednisone.   - off therapy now and otherwise doing well.   - can follow up outpatient with her Rheumatologist upon discharge.         Pulmonary nodule    - CXR suggestive of 7 mm slightly irregular shaped radiodensity projected over the left upper lung zone which could represent a calcified granuloma but remains indeterminate.  - CT Chest on 5/28: Multiple scattered noncalcified pulmonary granulomas, the largest measuring 8 mm in the lateral segment of the right middle lobe.   - repeat non-contrast chest CT at 6-12 months         Insomnia    - PRN nightly Xanax.         HTN (hypertension)    - history of SVT and reports intermittent palpitations at times.  - EKG on 5/28 - NSR.  - increased metoprolol XL to 25 mg QD,she feels better with this dose.  - metoprolol tartrate changed on 6/13 to 25 mg bid (hold for SBP <100) due to hypotension  - BP improved        GERD (gastroesophageal reflux disease)    - takes Omeprazole at home, now on pepcid here  - Protonix  added 6/13  - reflux improved         Osteoporosis    - takes prolia once every 6 months, last given mid April 2018   - no acute issue         Depression    - no acute issue   - continue home Escitalopram             VTE Risk Mitigation         Ordered     heparin, porcine (PF) 100 unit/mL injection flush 300 Units  As needed (PRN)      06/15/18 1657     Place MAYLIN hose  Until discontinued      05/25/18 0032     IP VTE HIGH RISK PATIENT  Once      05/25/18 0032          Disposition: pending day 14 Bm bx and count recovery    Sweetie Lepe NP  Bone Marrow Transplant  [facility]

## 2018-06-22 NOTE — ASSESSMENT & PLAN NOTE
Pt reports nausea has improved with introduction of Zyprexa. She was able to keep down some solid food today.  - continue Zyprexa daily  - continue PRN Zofran, Compazine and Ativan  - meds IV if possible

## 2018-06-22 NOTE — ASSESSMENT & PLAN NOTE
- Admitted from [facility] on 5/25/18 early AM with WBC around 100s, for 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 Ordered routine labs in addition to FLT3, NGS, and AML FISH.  - lo res HLA typing on 5/26/18 and hi res on 5/27/18 done in anticipation of possible need for future transplant   - Pt with two daughters, two full sisters (in their mid 60s, one with brain aneurysm hx, other one without any medical issues), and one full brother (61 y/o healthy).  - ECHO ordered 5/25/18, EF 65%  - PICC line placed 5/25/18   - initially on hydrea, discontinued on 5/28.   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  - started on induction therapy on 5/29/18.  - Day 24 of 7+3.    - allopurinol stopped 6/11  - prophylactic antimicrobials: Acyclovir, Voriconazole  - will need CNS Prophylaxis given her elevated WBC (high risk) on admission after count recovery.  - NPM1 +, CEBPA (-), FLT3 (+).  On Midostaurin 50 mg PO BID until Day 14 (held starting 6/8 to 6/11). Stopped when FLAG JENNIFER re-induction started. RestartingMidostaurin at 25 mg bid on day 8 of FLAG JENNIFER induction (6/22)  - day 14 bone marrow biopsy completed 6/11 showing persistent disease with 15% CD 34 positive blasts  - Day 8 of FLAG-JENNIFER, tolerating without difficulty except nausea or vomiting  - repeat 2D echo on 6/15 with preserved EF of 60%  - plan for day 14 restaging bone marrow biopsy on 6/29

## 2018-06-22 NOTE — ASSESSMENT & PLAN NOTE
- Corrected calcium 8.2 today.  - will give calcium chloride 1 gram today  - continue Calcium carbonate TID and Vitamin D 5,000 units daily for now  - if Ca normalizes can switch to lower dose Calcium-Vit D supplement

## 2018-06-22 NOTE — ASSESSMENT & PLAN NOTE
- secondary to chemotherapy.   - WBC 0.02 and ANC 0, on Neupogen per FLAG JENNIFER protocol  - hgb 7.0 and platelet count 5,000 today  - tranfuse if Hb < 7 g/dL and Plt < 10,000/mm3 or active bleed.   - will transfuse 1 unit of platelets today

## 2018-06-22 NOTE — ASSESSMENT & PLAN NOTE
AST and ALT have normalized. Alk phos is still elevated and is up to 1041 today.   - obtain GGT today  - midostaurin starting today at 25 mg bid, will monitor liver enzymes closely  - plan for possible liver ultrasound if enzymes continue to rise or GGT abnormal

## 2018-06-23 LAB
ALBUMIN SERPL BCP-MCNC: 2 G/DL
ALP SERPL-CCNC: 1051 U/L
ALT SERPL W/O P-5'-P-CCNC: 27 U/L
ANION GAP SERPL CALC-SCNC: 4 MMOL/L
ANISOCYTOSIS BLD QL SMEAR: SLIGHT
AST SERPL-CCNC: 51 U/L
BASOPHILS # BLD AUTO: 0 K/UL
BASOPHILS NFR BLD: 0 %
BILIRUB SERPL-MCNC: 1 MG/DL
BLD PROD TYP BPU: NORMAL
BLD PROD TYP BPU: NORMAL
BLOOD UNIT EXPIRATION DATE: NORMAL
BLOOD UNIT EXPIRATION DATE: NORMAL
BLOOD UNIT TYPE CODE: 6200
BLOOD UNIT TYPE CODE: 6200
BLOOD UNIT TYPE: NORMAL
BLOOD UNIT TYPE: NORMAL
BUN SERPL-MCNC: 3 MG/DL
CA-I BLDV-SCNC: 1.11 MMOL/L
CALCIUM SERPL-MCNC: 7.4 MG/DL
CHLORIDE SERPL-SCNC: 113 MMOL/L
CO2 SERPL-SCNC: 23 MMOL/L
CODING SYSTEM: NORMAL
CODING SYSTEM: NORMAL
CREAT SERPL-MCNC: 0.6 MG/DL
DIFFERENTIAL METHOD: ABNORMAL
DISPENSE STATUS: NORMAL
DISPENSE STATUS: NORMAL
EOSINOPHIL # BLD AUTO: 0 K/UL
EOSINOPHIL NFR BLD: 0 %
ERYTHROCYTE [DISTWIDTH] IN BLOOD BY AUTOMATED COUNT: 15.9 %
EST. GFR  (AFRICAN AMERICAN): >60 ML/MIN/1.73 M^2
EST. GFR  (NON AFRICAN AMERICAN): >60 ML/MIN/1.73 M^2
GLUCOSE SERPL-MCNC: 93 MG/DL
HCT VFR BLD AUTO: 20.2 %
HGB BLD-MCNC: 6.9 G/DL
HYPOCHROMIA BLD QL SMEAR: ABNORMAL
IMM GRANULOCYTES # BLD AUTO: 0 K/UL
IMM GRANULOCYTES NFR BLD AUTO: 0 %
LYMPHOCYTES # BLD AUTO: 0 K/UL
LYMPHOCYTES NFR BLD: 100 %
MAGNESIUM SERPL-MCNC: 1.4 MG/DL
MAGNESIUM SERPL-MCNC: 1.9 MG/DL
MCH RBC QN AUTO: 31.4 PG
MCHC RBC AUTO-ENTMCNC: 34.2 G/DL
MCV RBC AUTO: 92 FL
MONOCYTES # BLD AUTO: 0 K/UL
MONOCYTES NFR BLD: 0 %
NEUTROPHILS # BLD AUTO: 0 K/UL
NEUTROPHILS NFR BLD: 0 %
NRBC BLD-RTO: 0 /100 WBC
NUM UNITS TRANS PACKED RBC: NORMAL
NUM UNITS TRANS WBC-POOR PLATPHERESIS: NORMAL
OVALOCYTES BLD QL SMEAR: ABNORMAL
PHOSPHATE SERPL-MCNC: 1.8 MG/DL
PHOSPHATE SERPL-MCNC: 1.9 MG/DL
PLATELET # BLD AUTO: 5 K/UL
PLATELET BLD QL SMEAR: ABNORMAL
PMV BLD AUTO: 11.6 FL
POIKILOCYTOSIS BLD QL SMEAR: SLIGHT
POTASSIUM SERPL-SCNC: 3.7 MMOL/L
PROT SERPL-MCNC: 4.6 G/DL
RBC # BLD AUTO: 2.2 M/UL
SODIUM SERPL-SCNC: 140 MMOL/L
WBC # BLD AUTO: 0.01 K/UL

## 2018-06-23 PROCEDURE — 85025 COMPLETE CBC W/AUTO DIFF WBC: CPT

## 2018-06-23 PROCEDURE — 25000003 PHARM REV CODE 250: Performed by: STUDENT IN AN ORGANIZED HEALTH CARE EDUCATION/TRAINING PROGRAM

## 2018-06-23 PROCEDURE — 63600175 PHARM REV CODE 636 W HCPCS: Performed by: INTERNAL MEDICINE

## 2018-06-23 PROCEDURE — 25000003 PHARM REV CODE 250: Performed by: INTERNAL MEDICINE

## 2018-06-23 PROCEDURE — P9040 RBC LEUKOREDUCED IRRADIATED: HCPCS

## 2018-06-23 PROCEDURE — 82330 ASSAY OF CALCIUM: CPT

## 2018-06-23 PROCEDURE — 86644 CMV ANTIBODY: CPT

## 2018-06-23 PROCEDURE — 25000003 PHARM REV CODE 250: Performed by: NURSE PRACTITIONER

## 2018-06-23 PROCEDURE — 84100 ASSAY OF PHOSPHORUS: CPT

## 2018-06-23 PROCEDURE — P9037 PLATE PHERES LEUKOREDU IRRAD: HCPCS

## 2018-06-23 PROCEDURE — 36430 TRANSFUSION BLD/BLD COMPNT: CPT

## 2018-06-23 PROCEDURE — 63600175 PHARM REV CODE 636 W HCPCS: Performed by: NURSE PRACTITIONER

## 2018-06-23 PROCEDURE — 83735 ASSAY OF MAGNESIUM: CPT | Mod: 91

## 2018-06-23 PROCEDURE — 80053 COMPREHEN METABOLIC PANEL: CPT

## 2018-06-23 PROCEDURE — 20600001 HC STEP DOWN PRIVATE ROOM

## 2018-06-23 PROCEDURE — S0030 INJECTION, METRONIDAZOLE: HCPCS | Performed by: NURSE PRACTITIONER

## 2018-06-23 PROCEDURE — A4216 STERILE WATER/SALINE, 10 ML: HCPCS | Performed by: INTERNAL MEDICINE

## 2018-06-23 PROCEDURE — 99233 SBSQ HOSP IP/OBS HIGH 50: CPT | Mod: ,,, | Performed by: INTERNAL MEDICINE

## 2018-06-23 PROCEDURE — C9113 INJ PANTOPRAZOLE SODIUM, VIA: HCPCS | Performed by: NURSE PRACTITIONER

## 2018-06-23 RX ORDER — DIPHENHYDRAMINE HCL 25 MG
25 CAPSULE ORAL ONCE AS NEEDED
Status: COMPLETED | OUTPATIENT
Start: 2018-06-23 | End: 2018-06-23

## 2018-06-23 RX ORDER — ACETAMINOPHEN 325 MG/1
650 TABLET ORAL ONCE AS NEEDED
Status: COMPLETED | OUTPATIENT
Start: 2018-06-23 | End: 2018-06-23

## 2018-06-23 RX ORDER — SODIUM CHLORIDE 9 MG/ML
INJECTION, SOLUTION INTRAVENOUS
Status: DISCONTINUED | OUTPATIENT
Start: 2018-06-23 | End: 2018-06-23

## 2018-06-23 RX ADMIN — SODIUM CHLORIDE: 0.9 INJECTION, SOLUTION INTRAVENOUS at 11:06

## 2018-06-23 RX ADMIN — LORAZEPAM 0.5 MG: 2 INJECTION INTRAMUSCULAR; INTRAVENOUS at 08:06

## 2018-06-23 RX ADMIN — DEXAMETHASONE 1 DROP: 1 SUSPENSION OPHTHALMIC at 12:06

## 2018-06-23 RX ADMIN — SODIUM PHOSPHATE, MONOBASIC, MONOHYDRATE 20.01 MMOL: 276; 142 INJECTION, SOLUTION INTRAVENOUS at 05:06

## 2018-06-23 RX ADMIN — Medication 10 ML: at 05:06

## 2018-06-23 RX ADMIN — CIPROFLOXACIN 400 MG: 2 INJECTION, SOLUTION INTRAVENOUS at 08:06

## 2018-06-23 RX ADMIN — Medication 1250 MG: at 08:06

## 2018-06-23 RX ADMIN — ACYCLOVIR SODIUM 200 MG: 50 INJECTION, SOLUTION INTRAVENOUS at 08:06

## 2018-06-23 RX ADMIN — METRONIDAZOLE 500 MG: 500 INJECTION, SOLUTION INTRAVENOUS at 01:06

## 2018-06-23 RX ADMIN — SODIUM CHLORIDE: 900 INJECTION, SOLUTION INTRAVENOUS at 04:06

## 2018-06-23 RX ADMIN — MAGNESIUM SULFATE IN WATER 2 G: 40 INJECTION, SOLUTION INTRAVENOUS at 06:06

## 2018-06-23 RX ADMIN — VANCOMYCIN HYDROCHLORIDE 1250 MG: 10 INJECTION, POWDER, LYOPHILIZED, FOR SOLUTION INTRAVENOUS at 08:06

## 2018-06-23 RX ADMIN — ACETAMINOPHEN 650 MG: 325 TABLET ORAL at 09:06

## 2018-06-23 RX ADMIN — Medication 10 ML: at 12:06

## 2018-06-23 RX ADMIN — ALPRAZOLAM 0.25 MG: 0.25 TABLET ORAL at 05:06

## 2018-06-23 RX ADMIN — METRONIDAZOLE 500 MG: 500 INJECTION, SOLUTION INTRAVENOUS at 05:06

## 2018-06-23 RX ADMIN — FILGRASTIM 480 MCG: 480 INJECTION, SOLUTION INTRAVENOUS; SUBCUTANEOUS at 08:06

## 2018-06-23 RX ADMIN — SODIUM PHOSPHATE, MONOBASIC, MONOHYDRATE 20.01 MMOL: 276; 142 INJECTION, SOLUTION INTRAVENOUS at 06:06

## 2018-06-23 RX ADMIN — DEXAMETHASONE 1 DROP: 1 SUSPENSION OPHTHALMIC at 05:06

## 2018-06-23 RX ADMIN — SODIUM CHLORIDE: 900 INJECTION, SOLUTION INTRAVENOUS at 11:06

## 2018-06-23 RX ADMIN — DEXTROSE 310 MG: 5 SOLUTION INTRAVENOUS at 08:06

## 2018-06-23 RX ADMIN — ACYCLOVIR SODIUM 200 MG: 50 INJECTION, SOLUTION INTRAVENOUS at 09:06

## 2018-06-23 RX ADMIN — ALPRAZOLAM 0.25 MG: 0.25 TABLET ORAL at 10:06

## 2018-06-23 RX ADMIN — METOPROLOL TARTRATE 25 MG: 25 TABLET, FILM COATED ORAL at 09:06

## 2018-06-23 RX ADMIN — METOPROLOL TARTRATE 25 MG: 25 TABLET, FILM COATED ORAL at 08:06

## 2018-06-23 RX ADMIN — METRONIDAZOLE 500 MG: 500 INJECTION, SOLUTION INTRAVENOUS at 10:06

## 2018-06-23 RX ADMIN — OLANZAPINE 5 MG: 5 TABLET, ORALLY DISINTEGRATING ORAL at 09:06

## 2018-06-23 RX ADMIN — ONDANSETRON 8 MG: 2 INJECTION INTRAMUSCULAR; INTRAVENOUS at 11:06

## 2018-06-23 RX ADMIN — DEXAMETHASONE 1 DROP: 1 SUSPENSION OPHTHALMIC at 11:06

## 2018-06-23 RX ADMIN — VITAMIN D, TAB 1000IU (100/BT) 5000 UNITS: 25 TAB at 09:06

## 2018-06-23 RX ADMIN — MAGNESIUM SULFATE IN WATER 2 G: 40 INJECTION, SOLUTION INTRAVENOUS at 04:06

## 2018-06-23 RX ADMIN — ESCITALOPRAM 10 MG: 5 TABLET, FILM COATED ORAL at 09:06

## 2018-06-23 RX ADMIN — DIPHENHYDRAMINE HYDROCHLORIDE 25 MG: 25 CAPSULE ORAL at 09:06

## 2018-06-23 RX ADMIN — Medication 10 ML: at 11:06

## 2018-06-23 RX ADMIN — DEXTROSE 40 MG: 50 INJECTION, SOLUTION INTRAVENOUS at 09:06

## 2018-06-23 NOTE — SUBJECTIVE & OBJECTIVE
Subjective:     Interval History: Day 26 of 7+3 and Day 9 of FLAG JENNIFER  Still has nausea.  Meds per IV better as pills in stomach trigger nausea.  Able to ambulate to bedside commode without assistance.  Has not been ambulating much otherwise due to fatigue.  No fevers overnight.  No cough.  Diarrhea controlled with imodium.  BMx2 /24h.  Still watery.    Objective:     Vital Signs (Most Recent):  Temp: 98.2 °F (36.8 °C) (06/23/18 1201)  Pulse: 64 (06/23/18 1201)  Resp: 17 (06/23/18 1201)  BP: 130/63 (06/23/18 1201)  SpO2: 95 % (06/23/18 1201) Vital Signs (24h Range):  Temp:  [98.2 °F (36.8 °C)-98.7 °F (37.1 °C)] 98.2 °F (36.8 °C)  Pulse:  [59-68] 64  Resp:  [16-17] 17  SpO2:  [95 %-97 %] 95 %  BP: (108-155)/(57-70) 130/63     Weight: 71.5 kg (157 lb 10.1 oz)  Body mass index is 27.92 kg/m².  Body surface area is 1.78 meters squared.    ECOG SCORE         [unfilled]    Intake/Output - Last 3 Shifts       06/21 0700 - 06/22 0659 06/22 0700 - 06/23 0659 06/23 0700 - 06/24 0659    P.O. 970 660 120    I.V. (mL/kg) 1871.7 (26.1) 2563.3 (35.9) 920 (12.9)    Blood  245 217    IV Piggyback 1950 1900 1000    Total Intake(mL/kg) 4791.7 (66.9) 5368.3 (75.1) 2257 (31.6)    Urine (mL/kg/hr) 6400 (3.7) 5250 (3.1) 1200 (2.2)    Total Output 6400 5250 1200    Net -1608.3 +118.3 +1057           Urine Occurrence 3 x  1 x    Stool Occurrence 1 x 0 x 0 x    Emesis Occurrence  1 x           Physical Exam   Constitutional: She is oriented to person, place, and time. She appears well-developed and well-nourished. No distress.   HENT:   Mouth/Throat: Oropharynx is clear and moist. No oropharyngeal exudate or posterior oropharyngeal erythema.   Eyes: Conjunctivae are normal. Pupils are equal, round, and reactive to light. No scleral icterus.   Neck: Normal range of motion. Neck supple. No thyromegaly present.   Cardiovascular: Normal rate, regular rhythm, normal heart sounds and intact distal pulses.    Pulmonary/Chest: Effort normal and  breath sounds normal. No respiratory distress.   Abdominal: Soft. Bowel sounds are normal. She exhibits no distension. There is no splenomegaly or hepatomegaly. There is no tenderness.   Musculoskeletal: Normal range of motion. She exhibits no edema or tenderness.   Neurological: She is alert and oriented to person, place, and time. No cranial nerve deficit. Coordination normal.   Skin: No rash noted. No cyanosis. No pallor. Nails show no clubbing.   PICC intact R arm no redness or drainage   Psychiatric: She has a normal mood and affect. Thought content normal.   Vitals reviewed.      Significant Labs:   CBC:   Recent Labs  Lab 06/22/18 0451 06/23/18 0452   WBC 0.02* 0.01*   HGB 7.0* 6.9*   HCT 21.2* 20.2*   PLT 5* 5*    and CMP:   Recent Labs  Lab 06/22/18 0451 06/23/18 0452    140   K 3.1* 3.7   * 113*   CO2 24 23   GLU 97 93   BUN 5* 3*   CREATININE 0.6 0.6   CALCIUM 6.5* 7.4*   PROT 4.3* 4.6*   ALBUMIN 1.9* 2.0*   BILITOT 1.0 1.0   ALKPHOS 1,024* 1,051*   AST 57* 51*   ALT 28 27   ANIONGAP 4* 4*   EGFRNONAA >60.0 >60.0       Diagnostic Results:  I have reviewed all pertinent imaging results/findings within the past 24 hours.     6/22/18 RUQ US with doppler  Intrahepatic ductal dilatation.  No sonographic visualization of choledocholithiasis or distal mass.  Further evaluation could be performed with MRI/MRCP if clinically indicated.    Post cholecystectomy    Findings suggestive of hepatic steatosis.    Collateral vessels noted at the splenic hilum.

## 2018-06-23 NOTE — ASSESSMENT & PLAN NOTE
- Corrected calcium 9 today.  - continue Calcium carbonate TID and Vitamin D 5,000 units daily for now  - if Ca normalizes can switch to lower dose Calcium-Vit D supplement

## 2018-06-23 NOTE — PLAN OF CARE
Problem: Patient Care Overview  Goal: Plan of Care Review  Outcome: Ongoing (interventions implemented as appropriate)  Patient reports no discomfort except nausea this morning. Patient ambulating the halls using neutropenic precautions, with spouse next to her.

## 2018-06-23 NOTE — ASSESSMENT & PLAN NOTE
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 Ordered routine labs in addition to FLT3, NGS, and AML FISH.  - lo res HLA typing on 5/26/18 and hi res on 5/27/18 done in anticipation of possible need for future transplant   - Pt with two daughters, two full sisters (in their mid 60s, one with brain aneurysm hx, other one without any medical issues), and one full brother (61 y/o healthy).  - ECHO ordered 5/25/18, EF 65%  - PICC line placed 5/25/18   - initially on hydrea, discontinued on 5/28.   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  - started on induction therapy on 5/29/18.  - Day 26 of 7+3.    - allopurinol stopped 6/11  - prophylactic antimicrobials: Acyclovir, Voriconazole  - will need CNS Prophylaxis given her elevated WBC (high risk) on admission after count recovery.  - NPM1 +, CEBPA (-), FLT3 (+).  On Midostaurin 50 mg PO BID until Day 14 (held starting 6/8 to 6/11). Stopped when FLAG JENNIFER re-induction started. Restarting Midostaurin at 25 mg bid on day 8 of FLAG JENNIFER induction (6/22)  - day 14 bone marrow biopsy completed 6/11 showing persistent disease with 15% CD 34 positive blasts  - Day 9 of FLAG-JENNIFER, tolerating without difficulty except nausea or vomiting  - repeat 2D echo on 6/15 with preserved EF of 60%  - plan for day 14 restaging bone marrow biopsy on 6/29

## 2018-06-23 NOTE — ASSESSMENT & PLAN NOTE
- blood cultures from 6/11 at 0430 with gram + cocci, staph epidermis sensitive for Vancomycin, CXR and UA unremarkable.   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

## 2018-06-23 NOTE — PROGRESS NOTES
[facility]  Hematology  Bone Marrow Transplant  Progress Note    Patient Name: Noreen Mac  Admission Date: 5/24/2018  Hospital Length of Stay: 30 days  Code Status: Full Code    Subjective:     Interval History: Day 26 of 7+3 and Day 9 of FLAG JENNIFER  Still has nausea.  Meds per IV better as pills in stomach trigger nausea.  Able to ambulate to bedside commode without assistance.  Has not been ambulating much otherwise due to fatigue.  No fevers overnight.  No cough.  Diarrhea controlled with imodium.  BMx2 /24h.  Still watery.    Objective:     Vital Signs (Most Recent):  Temp: 98.2 °F (36.8 °C) (06/23/18 1201)  Pulse: 64 (06/23/18 1201)  Resp: 17 (06/23/18 1201)  BP: 130/63 (06/23/18 1201)  SpO2: 95 % (06/23/18 1201) Vital Signs (24h Range):  Temp:  [98.2 °F (36.8 °C)-98.7 °F (37.1 °C)] 98.2 °F (36.8 °C)  Pulse:  [59-68] 64  Resp:  [16-17] 17  SpO2:  [95 %-97 %] 95 %  BP: (108-155)/(57-70) 130/63     Weight: 71.5 kg (157 lb 10.1 oz)  Body mass index is 27.92 kg/m².  Body surface area is 1.78 meters squared.    ECOG SCORE         [unfilled]    Intake/Output - Last 3 Shifts       06/21 0700 - 06/22 0659 06/22 0700 - 06/23 0659 06/23 0700 - 06/24 0659    P.O. 970 660 120    I.V. (mL/kg) 1871.7 (26.1) 2563.3 (35.9) 920 (12.9)    Blood  245 217    IV Piggyback 1950 1900 1000    Total Intake(mL/kg) 4791.7 (66.9) 5368.3 (75.1) 2257 (31.6)    Urine (mL/kg/hr) 6400 (3.7) 5250 (3.1) 1200 (2.2)    Total Output 6400 5250 1200    Net -1608.3 +118.3 +1057           Urine Occurrence 3 x  1 x    Stool Occurrence 1 x 0 x 0 x    Emesis Occurrence  1 x           Physical Exam   Constitutional: She is oriented to person, place, and time. She appears well-developed and well-nourished. No distress.   HENT:   Mouth/Throat: Oropharynx is clear and moist. No oropharyngeal exudate or posterior oropharyngeal erythema.   Eyes: Conjunctivae are normal. Pupils are equal, round, and reactive to light. No scleral icterus.    Neck: Normal range of motion. Neck supple. No thyromegaly present.   Cardiovascular: Normal rate, regular rhythm, normal heart sounds and intact distal pulses.    Pulmonary/Chest: Effort normal and breath sounds normal. No respiratory distress.   Abdominal: Soft. Bowel sounds are normal. She exhibits no distension. There is no splenomegaly or hepatomegaly. There is no tenderness.   Musculoskeletal: Normal range of motion. She exhibits no edema or tenderness.   Neurological: She is alert and oriented to person, place, and time. No cranial nerve deficit. Coordination normal.   Skin: No rash noted. No cyanosis. No pallor. Nails show no clubbing.   PICC intact R arm no redness or drainage   Psychiatric: She has a normal mood and affect. Thought content normal.   Vitals reviewed.      Significant Labs:   CBC:   Recent Labs  Lab 06/22/18 0451 06/23/18  0452   WBC 0.02* 0.01*   HGB 7.0* 6.9*   HCT 21.2* 20.2*   PLT 5* 5*    and CMP:   Recent Labs  Lab 06/22/18 0451 06/23/18  0452    140   K 3.1* 3.7   * 113*   CO2 24 23   GLU 97 93   BUN 5* 3*   CREATININE 0.6 0.6   CALCIUM 6.5* 7.4*   PROT 4.3* 4.6*   ALBUMIN 1.9* 2.0*   BILITOT 1.0 1.0   ALKPHOS 1,024* 1,051*   AST 57* 51*   ALT 28 27   ANIONGAP 4* 4*   EGFRNONAA >60.0 >60.0       Diagnostic Results:  I have reviewed all pertinent imaging results/findings within the past 24 hours.     6/22/18 RUQ US with doppler  Intrahepatic ductal dilatation.  No sonographic visualization of choledocholithiasis or distal mass.  Further evaluation could be performed with MRI/MRCP if clinically indicated.    Post cholecystectomy    Findings suggestive of hepatic steatosis.    Collateral vessels noted at the splenic hilum.    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 Ordered routine labs in addition to FLT3, NGS, and AML FISH.  - lo res HLA typing on 5/26/18 and hi res on 5/27/18 done in anticipation of possible need for future transplant   - Pt with two daughters, two full sisters (in their mid 60s, one with brain aneurysm hx, other one without any medical issues), and one full brother (59 y/o healthy).  - ECHO ordered 5/25/18, EF 65%  - PICC line placed 5/25/18   - initially on hydrea, discontinued on 5/28.   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  - started on induction therapy on 5/29/18.  - Day 26 of 7+3.    - allopurinol stopped 6/11  - prophylactic antimicrobials: Acyclovir, Voriconazole  - will need CNS Prophylaxis given her elevated WBC (high risk) on admission after count recovery.  - NPM1 +, CEBPA (-), FLT3 (+).  On Midostaurin 50 mg PO BID until Day 14 (held starting 6/8 to 6/11). Stopped when FLAG JENNIFER re-induction started. Restarting Midostaurin at 25 mg bid on day 8 of FLAG JENNIFER induction (6/22)  - day 14 bone marrow biopsy completed 6/11 showing persistent disease with 15% CD 34 positive blasts  - Day 9 of FLAG-JENNFIER, tolerating without difficulty except nausea or vomiting  - repeat 2D echo on 6/15 with preserved EF of 60%  - plan for day 14 restaging bone marrow biopsy on 6/29          Hypophosphatemia    -phos 1.9 today  -replace phos per protocol        Hypokalemia    - K 3.7 today          CINV (chemotherapy-induced nausea and vomiting)    Improved today with Olanzapine.  - continue daily olanzapine  - prn Zofran and Phenergan        Chemotherapy-induced nausea    Pt reports nausea has improved with introduction of Zyprexa. She was able to keep down some solid food today.  - continue Zyprexa daily  - continue PRN Zofran, Compazine and Ativan  - meds IV if possible        Hypocalcemia    - Corrected calcium 9 today.  - continue Calcium carbonate TID and Vitamin D 5,000 units daily for now  - if Ca normalizes can  switch to lower dose Calcium-Vit D supplement        Abnormal liver enzymes    AST and ALT have normalized. Alk phos is still elevated and is up to 1041 today.   - obtain GGT today  - midostaurin starting today at 25 mg bid, will monitor liver enzymes closely  - 6/22/18 liver US showing mild intrahepatic dilation, otherwise impression of hepatic steatosis.  Will hold off of MRCP at this time.  Alk phos 1051 not significant rise from 1024 previously.        Hypomagnesemia    Mg 1.9 today.  - Mag oxide replacement per protocol        Pancytopenia    - secondary to chemotherapy.   - WBC 0.01 and ANC 0, on Neupogen per FLAG JENNIFER protocol  - hgb 6.9 and platelet count 5,000 today  - tranfuse if Hb < 7 g/dL and Plt < 10,000/mm3 or active bleed.   - will transfuse 1 unit of prbc and 1 unit platelets today          MCTD (mixed connective tissue disease)    - MCTD-NOS  - previously on MTX and Prednisone.   - off therapy now and otherwise doing well.   - can follow up outpatient with her Rheumatologist upon discharge.         Staphylococcus epidermidis bacteremia    - blood cultures from 6/11 at 0430 with gram + cocci, staph epidermis sensitive for Vancomycin, CXR and UA unremarkable.   - Continue vancomycin with projected end date of 6/27  - Vanc at 1250 mg BID, vanc trough 18.9 on 6/20  - surveillance blood cultures repeated 6/13 NGTD  - de-escalated aztreonam back to prophylactic cipro 6/16  - ID consulted on 6/19, rec continue Vanc for 2 weeks post last negative culture (EOT 6/27)  --Last vanc trough 6/22/18 PM = 15.7  - BC from 6/11 with gram negative rods, leptotrichia goodfellowii  - started Flagyl po x 5 days (EOT 6/24 afternoon)  - ID has signed off        Pulmonary nodule    - CXR suggestive of 7 mm slightly irregular shaped radiodensity projected over the left upper lung zone which could represent a calcified granuloma but remains indeterminate.  - CT Chest on 5/28: Multiple scattered noncalcified pulmonary  granulomas, the largest measuring 8 mm in the lateral segment of the right middle lobe.   - repeat non-contrast chest CT at 6-12 months         Insomnia    - PRN nightly Xanax.         HTN (hypertension)    - history of SVT and reports intermittent palpitations at times.  - EKG on 5/28 - NSR.  - increased metoprolol XL to 25 mg QD,she feels better with this dose.  - metoprolol tartrate changed on 6/13 to 25 mg bid (hold for SBP <100) due to hypotension  - BP improved        GERD (gastroesophageal reflux disease)    - takes Omeprazole at home, now on pepcid here  - Protonix added 6/13  - reflux improved         Osteoporosis    - takes prolia once every 6 months, last given mid April 2018   - no acute issue         Depression    - no acute issue   - continue home Escitalopram             VTE Risk Mitigation         Ordered     heparin, porcine (PF) 100 unit/mL injection flush 300 Units  As needed (PRN)      06/15/18 1657     Place MAYLIN hose  Until discontinued      05/25/18 0032     IP VTE HIGH RISK PATIENT  Once      05/25/18 0032          Disposition: inpatient    Alex Chu MD  Bone Marrow Transplant  [facility]

## 2018-06-23 NOTE — ASSESSMENT & PLAN NOTE
AST and ALT have normalized. Alk phos is still elevated and is up to 1041 today.   - obtain GGT today  - midostaurin starting today at 25 mg bid, will monitor liver enzymes closely  - 6/22/18 liver US showing mild intrahepatic dilation, otherwise impression of hepatic steatosis.  Will hold off of MRCP at this time.  Alk phos 1051 not significant rise from 1024 previously.

## 2018-06-23 NOTE — ASSESSMENT & PLAN NOTE
- secondary to chemotherapy.   - WBC 0.01 and ANC 0, on Neupogen per FLAG JENINFER protocol  - hgb 6.9 and platelet count 5,000 today  - tranfuse if Hb < 7 g/dL and Plt < 10,000/mm3 or active bleed.   - will transfuse 1 unit of prbc and 1 unit platelets today

## 2018-06-24 LAB
ABO + RH BLD: NORMAL
ALBUMIN SERPL BCP-MCNC: 2.3 G/DL
ALP SERPL-CCNC: 1050 U/L
ALT SERPL W/O P-5'-P-CCNC: 23 U/L
ANION GAP SERPL CALC-SCNC: 8 MMOL/L
ANISOCYTOSIS BLD QL SMEAR: SLIGHT
AST SERPL-CCNC: 39 U/L
BASOPHILS # BLD AUTO: 0 K/UL
BASOPHILS NFR BLD: 0 %
BILIRUB SERPL-MCNC: 0.9 MG/DL
BLD GP AB SCN CELLS X3 SERPL QL: NORMAL
BUN SERPL-MCNC: 3 MG/DL
CA-I BLDV-SCNC: 1.03 MMOL/L
CALCIUM SERPL-MCNC: 7.1 MG/DL
CHLORIDE SERPL-SCNC: 111 MMOL/L
CO2 SERPL-SCNC: 23 MMOL/L
CREAT SERPL-MCNC: 0.5 MG/DL
DIFFERENTIAL METHOD: ABNORMAL
EOSINOPHIL # BLD AUTO: 0 K/UL
EOSINOPHIL NFR BLD: 0 %
ERYTHROCYTE [DISTWIDTH] IN BLOOD BY AUTOMATED COUNT: 15.9 %
EST. GFR  (AFRICAN AMERICAN): >60 ML/MIN/1.73 M^2
EST. GFR  (NON AFRICAN AMERICAN): >60 ML/MIN/1.73 M^2
GLUCOSE SERPL-MCNC: 91 MG/DL
HCT VFR BLD AUTO: 23.6 %
HGB BLD-MCNC: 7.8 G/DL
HYPOCHROMIA BLD QL SMEAR: ABNORMAL
IMM GRANULOCYTES # BLD AUTO: 0 K/UL
IMM GRANULOCYTES NFR BLD AUTO: 0 %
LYMPHOCYTES # BLD AUTO: 0 K/UL
LYMPHOCYTES NFR BLD: 50 %
MAGNESIUM SERPL-MCNC: 1.6 MG/DL
MAGNESIUM SERPL-MCNC: 1.7 MG/DL
MCH RBC QN AUTO: 29.9 PG
MCHC RBC AUTO-ENTMCNC: 33.1 G/DL
MCV RBC AUTO: 90 FL
MONOCYTES # BLD AUTO: 0 K/UL
MONOCYTES NFR BLD: 0 %
NEUTROPHILS # BLD AUTO: 0 K/UL
NEUTROPHILS NFR BLD: 50 %
NRBC BLD-RTO: 0 /100 WBC
PHOSPHATE SERPL-MCNC: 2.2 MG/DL
PHOSPHATE SERPL-MCNC: 2.5 MG/DL
PLATELET # BLD AUTO: 24 K/UL
PLATELET BLD QL SMEAR: ABNORMAL
PMV BLD AUTO: 11.6 FL
POTASSIUM SERPL-SCNC: 3 MMOL/L
PROT SERPL-MCNC: 5 G/DL
RBC # BLD AUTO: 2.61 M/UL
SODIUM SERPL-SCNC: 142 MMOL/L
WBC # BLD AUTO: 0.02 K/UL

## 2018-06-24 PROCEDURE — 25000003 PHARM REV CODE 250: Performed by: NURSE PRACTITIONER

## 2018-06-24 PROCEDURE — 25000003 PHARM REV CODE 250: Performed by: STUDENT IN AN ORGANIZED HEALTH CARE EDUCATION/TRAINING PROGRAM

## 2018-06-24 PROCEDURE — 25000003 PHARM REV CODE 250: Performed by: INTERNAL MEDICINE

## 2018-06-24 PROCEDURE — 63600175 PHARM REV CODE 636 W HCPCS: Performed by: INTERNAL MEDICINE

## 2018-06-24 PROCEDURE — 63600175 PHARM REV CODE 636 W HCPCS: Performed by: NURSE PRACTITIONER

## 2018-06-24 PROCEDURE — 83735 ASSAY OF MAGNESIUM: CPT | Mod: 91

## 2018-06-24 PROCEDURE — 86920 COMPATIBILITY TEST SPIN: CPT

## 2018-06-24 PROCEDURE — 63600175 PHARM REV CODE 636 W HCPCS: Performed by: HOSPITALIST

## 2018-06-24 PROCEDURE — 63600175 PHARM REV CODE 636 W HCPCS: Mod: JG | Performed by: INTERNAL MEDICINE

## 2018-06-24 PROCEDURE — A4216 STERILE WATER/SALINE, 10 ML: HCPCS | Performed by: INTERNAL MEDICINE

## 2018-06-24 PROCEDURE — 99233 SBSQ HOSP IP/OBS HIGH 50: CPT | Mod: ,,, | Performed by: INTERNAL MEDICINE

## 2018-06-24 PROCEDURE — 82330 ASSAY OF CALCIUM: CPT

## 2018-06-24 PROCEDURE — 20600001 HC STEP DOWN PRIVATE ROOM

## 2018-06-24 PROCEDURE — 86901 BLOOD TYPING SEROLOGIC RH(D): CPT

## 2018-06-24 PROCEDURE — S0030 INJECTION, METRONIDAZOLE: HCPCS | Performed by: NURSE PRACTITIONER

## 2018-06-24 PROCEDURE — 85025 COMPLETE CBC W/AUTO DIFF WBC: CPT

## 2018-06-24 PROCEDURE — 80053 COMPREHEN METABOLIC PANEL: CPT

## 2018-06-24 PROCEDURE — C9113 INJ PANTOPRAZOLE SODIUM, VIA: HCPCS | Performed by: NURSE PRACTITIONER

## 2018-06-24 PROCEDURE — 84100 ASSAY OF PHOSPHORUS: CPT

## 2018-06-24 RX ORDER — POTASSIUM CHLORIDE 14.9 MG/ML
20 INJECTION INTRAVENOUS
Status: COMPLETED | OUTPATIENT
Start: 2018-06-24 | End: 2018-06-24

## 2018-06-24 RX ADMIN — POTASSIUM CHLORIDE 20 MEQ: 200 INJECTION, SOLUTION INTRAVENOUS at 08:06

## 2018-06-24 RX ADMIN — DIBASIC SODIUM PHOSPHATE, MONOBASIC POTASSIUM PHOSPHATE AND MONOBASIC SODIUM PHOSPHATE 2 TABLET: 852; 155; 130 TABLET ORAL at 03:06

## 2018-06-24 RX ADMIN — SODIUM PHOSPHATE, MONOBASIC, MONOHYDRATE 20.01 MMOL: 276; 142 INJECTION, SOLUTION INTRAVENOUS at 05:06

## 2018-06-24 RX ADMIN — METOPROLOL TARTRATE 25 MG: 25 TABLET, FILM COATED ORAL at 08:06

## 2018-06-24 RX ADMIN — Medication 800 MG: at 08:06

## 2018-06-24 RX ADMIN — CIPROFLOXACIN 400 MG: 2 INJECTION, SOLUTION INTRAVENOUS at 08:06

## 2018-06-24 RX ADMIN — METRONIDAZOLE 500 MG: 500 INJECTION, SOLUTION INTRAVENOUS at 05:06

## 2018-06-24 RX ADMIN — DEXTROSE 40 MG: 50 INJECTION, SOLUTION INTRAVENOUS at 07:06

## 2018-06-24 RX ADMIN — Medication 10 ML: at 12:06

## 2018-06-24 RX ADMIN — Medication 10 ML: at 06:06

## 2018-06-24 RX ADMIN — ALPRAZOLAM 0.25 MG: 0.25 TABLET ORAL at 11:06

## 2018-06-24 RX ADMIN — METRONIDAZOLE 500 MG: 500 INJECTION, SOLUTION INTRAVENOUS at 01:06

## 2018-06-24 RX ADMIN — OLANZAPINE 5 MG: 5 TABLET, ORALLY DISINTEGRATING ORAL at 08:06

## 2018-06-24 RX ADMIN — ESCITALOPRAM 10 MG: 5 TABLET, FILM COATED ORAL at 08:06

## 2018-06-24 RX ADMIN — MAGNESIUM SULFATE IN WATER 2 G: 40 INJECTION, SOLUTION INTRAVENOUS at 05:06

## 2018-06-24 RX ADMIN — ACYCLOVIR SODIUM 200 MG: 50 INJECTION, SOLUTION INTRAVENOUS at 08:06

## 2018-06-24 RX ADMIN — ONDANSETRON 8 MG: 2 INJECTION INTRAMUSCULAR; INTRAVENOUS at 08:06

## 2018-06-24 RX ADMIN — VANCOMYCIN HYDROCHLORIDE 1250 MG: 10 INJECTION, POWDER, LYOPHILIZED, FOR SOLUTION INTRAVENOUS at 07:06

## 2018-06-24 RX ADMIN — VITAMIN D, TAB 1000IU (100/BT) 5000 UNITS: 25 TAB at 08:06

## 2018-06-24 RX ADMIN — ALPRAZOLAM 0.25 MG: 0.25 TABLET ORAL at 07:06

## 2018-06-24 RX ADMIN — SODIUM PHOSPHATE, MONOBASIC, MONOHYDRATE 15 MMOL: 276; 142 INJECTION, SOLUTION INTRAVENOUS at 05:06

## 2018-06-24 RX ADMIN — CIPROFLOXACIN 400 MG: 2 INJECTION, SOLUTION INTRAVENOUS at 09:06

## 2018-06-24 RX ADMIN — DIBASIC SODIUM PHOSPHATE, MONOBASIC POTASSIUM PHOSPHATE AND MONOBASIC SODIUM PHOSPHATE 2 TABLET: 852; 155; 130 TABLET ORAL at 08:06

## 2018-06-24 RX ADMIN — PROCHLORPERAZINE MALEATE 10 MG: 5 TABLET, FILM COATED ORAL at 10:06

## 2018-06-24 RX ADMIN — POTASSIUM CHLORIDE 20 MEQ: 200 INJECTION, SOLUTION INTRAVENOUS at 05:06

## 2018-06-24 RX ADMIN — DEXTROSE 310 MG: 5 SOLUTION INTRAVENOUS at 10:06

## 2018-06-24 RX ADMIN — FILGRASTIM 480 MCG: 480 INJECTION, SOLUTION INTRAVENOUS; SUBCUTANEOUS at 07:06

## 2018-06-24 RX ADMIN — CALCIUM CHLORIDE 1 G: 100 INJECTION INTRAVENOUS; INTRAVENTRICULAR at 09:06

## 2018-06-24 RX ADMIN — DIBASIC SODIUM PHOSPHATE, MONOBASIC POTASSIUM PHOSPHATE AND MONOBASIC SODIUM PHOSPHATE 2 TABLET: 852; 155; 130 TABLET ORAL at 10:06

## 2018-06-24 RX ADMIN — Medication 10 ML: at 11:06

## 2018-06-24 RX ADMIN — DEXTROSE 310 MG: 5 SOLUTION INTRAVENOUS at 09:06

## 2018-06-24 RX ADMIN — SODIUM CHLORIDE: 0.9 INJECTION, SOLUTION INTRAVENOUS at 03:06

## 2018-06-24 NOTE — PLAN OF CARE
Problem: Patient Care Overview  Goal: Plan of Care Review  Outcome: Ongoing (interventions implemented as appropriate)  Plan of care reviewed with the patient at the beginning of the shift. Pt admitted for newly diagnosed AML. She is s/p 7&3 and FLAG-JENNIFER. She has complaints of nausea and diarrhea. Emesis x1, pt mostly dry heaved. Nausea managed with Zofran and Ativan. Small bland meals encouraged. No BM this shift. Pt using the BSC due to generalized weakness and fatigue. Fall precautions maintained. Daughter remained at the bedside and very attentive to care. Pt remained free from falls and injury this shift. Bed locked in lowest position, side rails up x2, call light within reach. Instructed pt to call for assistance as needed. Vitals stable. Pt afebrile. Neutropenic precautions maintained. No acute issues overnight. Will continue to monitor.

## 2018-06-24 NOTE — ASSESSMENT & PLAN NOTE
- secondary to chemotherapy.   - WBC 0.2 and ANC 0, on Neupogen per FLAG JENNIFER protocol  - hgb 7.8 and platelet count 24,000 today  - tranfuse if Hb < 7 g/dL and Plt < 10,000/mm3 or active bleed.

## 2018-06-24 NOTE — SUBJECTIVE & OBJECTIVE
Subjective:     Interval History: day 27 of 7+3 and Day 10 of FLAG JENNIFER  Still has nausea but able to tolerate pills today.  Appetite remains low, eats about 10% of her meals due to food triggering nausea.    Objective:     Vital Signs (Most Recent):  Temp: 98.6 °F (37 °C) (06/24/18 0720)  Pulse: 68 (06/24/18 0720)  Resp: 16 (06/24/18 0720)  BP: (!) 155/73 (06/24/18 0720)  SpO2: 96 % (06/24/18 0720) Vital Signs (24h Range):  Temp:  [98 °F (36.7 °C)-98.6 °F (37 °C)] 98.6 °F (37 °C)  Pulse:  [59-68] 68  Resp:  [16-20] 16  SpO2:  [94 %-98 %] 96 %  BP: (121-160)/(59-73) 155/73     Weight: 71.3 kg (157 lb 3 oz)  Body mass index is 27.84 kg/m².  Body surface area is 1.78 meters squared.    ECOG SCORE         [unfilled]    Intake/Output - Last 3 Shifts       06/22 0700 - 06/23 0659 06/23 0700 - 06/24 0659 06/24 0700 - 06/25 0659    P.O. 660 480     I.V. (mL/kg) 2563.3 (35.9) 2446.8 (34.3)     Blood 245 567     IV Piggyback 1900 1900     Total Intake(mL/kg) 5368.3 (75.1) 5393.8 (75.6)     Urine (mL/kg/hr) 5250 (3.1) 5900 (3.4)     Total Output 5250 5900      Net +118.3 -506.2             Urine Occurrence  1 x     Stool Occurrence 0 x 1 x 0 x    Emesis Occurrence 1 x            Physical Exam   Constitutional: She is oriented to person, place, and time. She appears well-developed and well-nourished. No distress.   HENT:   Mouth/Throat: Oropharynx is clear and moist. No oropharyngeal exudate or posterior oropharyngeal erythema.   Eyes: Conjunctivae are normal. Pupils are equal, round, and reactive to light. No scleral icterus.   Neck: Normal range of motion. Neck supple. No thyromegaly present.   Cardiovascular: Normal rate, regular rhythm, normal heart sounds and intact distal pulses.    Pulmonary/Chest: Effort normal and breath sounds normal. No respiratory distress.   Abdominal: Soft. Bowel sounds are normal. She exhibits no distension. There is no splenomegaly or hepatomegaly. There is no tenderness.   Musculoskeletal:  Normal range of motion. She exhibits no edema or tenderness.   Neurological: She is alert and oriented to person, place, and time. No cranial nerve deficit. Coordination normal.   Skin: No rash noted. No cyanosis. No pallor. Nails show no clubbing.   PICC intact R arm no redness or drainage   Psychiatric: She has a normal mood and affect. Thought content normal.   Vitals reviewed.      Significant Labs:   CBC:   Recent Labs  Lab 06/23/18  0452 06/24/18  0400   WBC 0.01* 0.02*   HGB 6.9* 7.8*   HCT 20.2* 23.6*   PLT 5* 24*    and CMP:   Recent Labs  Lab 06/23/18  0452 06/24/18  0400    142   K 3.7 3.0*   * 111*   CO2 23 23   GLU 93 91   BUN 3* 3*   CREATININE 0.6 0.5   CALCIUM 7.4* 7.1*   PROT 4.6* 5.0*   ALBUMIN 2.0* 2.3*   BILITOT 1.0 0.9   ALKPHOS 1,051* 1,050*   AST 51* 39   ALT 27 23   ANIONGAP 4* 8   EGFRNONAA >60.0 >60.0       Diagnostic Results:  None

## 2018-06-24 NOTE — ASSESSMENT & PLAN NOTE
- Corrected calcium 8.5 today.  --1g calcium chloride IV x1  - continue Calcium carbonate TID and Vitamin D 5,000 units daily for now  - if Ca normalizes can switch to lower dose Calcium-Vit D supplement

## 2018-06-24 NOTE — ASSESSMENT & PLAN NOTE
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 Ordered routine labs in addition to FLT3, NGS, and AML FISH.  - lo res HLA typing on 5/26/18 and hi res on 5/27/18 done in anticipation of possible need for future transplant   - Pt with two daughters, two full sisters (in their mid 60s, one with brain aneurysm hx, other one without any medical issues), and one full brother (59 y/o healthy).  - ECHO ordered 5/25/18, EF 65%  - PICC line placed 5/25/18   - initially on hydrea, discontinued on 5/28.   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  - started on induction therapy on 5/29/18.  - Day 27 of 7+3.    - allopurinol stopped 6/11  - prophylactic antimicrobials: Acyclovir, Voriconazole  - will need CNS Prophylaxis given her elevated WBC (high risk) on admission after count recovery.  - NPM1 +, CEBPA (-), FLT3 (+).  On Midostaurin 50 mg PO BID until Day 14 (held starting 6/8 to 6/11). Stopped when FLAG JENNIFER re-induction started. Restarting Midostaurin at 25 mg bid on day 8 of FLAG JENNIFER induction (6/22)  - day 14 bone marrow biopsy completed 6/11 showing persistent disease with 15% CD 34 positive blasts  - Day 10 of FLAG-JENNIFER, tolerating without difficulty except nausea or vomiting  - repeat 2D echo on 6/15 with preserved EF of 60%  - plan for day 14 restaging bone marrow biopsy on 6/29

## 2018-06-24 NOTE — ASSESSMENT & PLAN NOTE
AST and ALT have normalized. Alk phos is still elevated and is up to 1041 today.   - obtain GGT today  - midostaurin starting today at 25 mg bid, will monitor liver enzymes closely  - 6/22/18 liver US showing mild intrahepatic dilation, otherwise impression of hepatic steatosis.  Will hold off of MRCP at this time.  Alk phos 1050 not significant rise from 1024 previously

## 2018-06-24 NOTE — ASSESSMENT & PLAN NOTE
- blood cultures from 6/11 at 0430 with gram + cocci, staph epidermis sensitive for Vancomycin, CXR and UA unremarkable.   - Continue vancomycin with projected end date of 6/27  - Vanc at 1250 mg BID, vanc trough 18.9 on 6/20  - surveillance blood cultures repeated 6/13 NGTD  - de-escalated aztreonam back to prophylactic cipro 6/16  - ID consulted on 6/19, rec continue Vanc for 2 weeks post last negative culture (EOT 6/27)  --Last vanc trough 6/22/18 PM = 15.7  - BC from 6/11 with gram negative rods, leptotrichia goodfellowii  - completing Flagyl po x 5 days (EOT 6/24 afternoon)  - ID has signed off

## 2018-06-24 NOTE — PROGRESS NOTES
[facility]  Hematology  Bone Marrow Transplant  Progress Note    Patient Name: Noreen Mac  Admission Date: 5/24/2018  Hospital Length of Stay: 31 days  Code Status: Full Code    Subjective:     Interval History: day 27 of 7+3 and Day 10 of FLAG JENNIFER  Still has nausea but able to tolerate pills today.  Appetite remains low, eats about 10% of her meals due to food triggering nausea.    Objective:     Vital Signs (Most Recent):  Temp: 98.6 °F (37 °C) (06/24/18 0720)  Pulse: 68 (06/24/18 0720)  Resp: 16 (06/24/18 0720)  BP: (!) 155/73 (06/24/18 0720)  SpO2: 96 % (06/24/18 0720) Vital Signs (24h Range):  Temp:  [98 °F (36.7 °C)-98.6 °F (37 °C)] 98.6 °F (37 °C)  Pulse:  [59-68] 68  Resp:  [16-20] 16  SpO2:  [94 %-98 %] 96 %  BP: (121-160)/(59-73) 155/73     Weight: 71.3 kg (157 lb 3 oz)  Body mass index is 27.84 kg/m².  Body surface area is 1.78 meters squared.    ECOG SCORE         [unfilled]    Intake/Output - Last 3 Shifts       06/22 0700 - 06/23 0659 06/23 0700 - 06/24 0659 06/24 0700 - 06/25 0659    P.O. 660 480     I.V. (mL/kg) 2563.3 (35.9) 2446.8 (34.3)     Blood 245 567     IV Piggyback 1900 1900     Total Intake(mL/kg) 5368.3 (75.1) 5393.8 (75.6)     Urine (mL/kg/hr) 5250 (3.1) 5900 (3.4)     Total Output 5250 5900      Net +118.3 -506.2             Urine Occurrence  1 x     Stool Occurrence 0 x 1 x 0 x    Emesis Occurrence 1 x            Physical Exam   Constitutional: She is oriented to person, place, and time. She appears well-developed and well-nourished. No distress.   HENT:   Mouth/Throat: Oropharynx is clear and moist. No oropharyngeal exudate or posterior oropharyngeal erythema.   Eyes: Conjunctivae are normal. Pupils are equal, round, and reactive to light. No scleral icterus.   Neck: Normal range of motion. Neck supple. No thyromegaly present.   Cardiovascular: Normal rate, regular rhythm, normal heart sounds and intact distal pulses.    Pulmonary/Chest: Effort normal and breath  sounds normal. No respiratory distress.   Abdominal: Soft. Bowel sounds are normal. She exhibits no distension. There is no splenomegaly or hepatomegaly. There is no tenderness.   Musculoskeletal: Normal range of motion. She exhibits no edema or tenderness.   Neurological: She is alert and oriented to person, place, and time. No cranial nerve deficit. Coordination normal.   Skin: No rash noted. No cyanosis. No pallor. Nails show no clubbing.   PICC intact R arm no redness or drainage   Psychiatric: She has a normal mood and affect. Thought content normal.   Vitals reviewed.      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 Ordered routine labs in addition to FLT3, NGS, and AML FISH.  - lo res HLA typing on 5/26/18 and hi res on 5/27/18 done in anticipation of possible need for future transplant   - Pt with two daughters, two full sisters (in their mid 60s, one with brain aneurysm hx, other one without any medical issues), and one full brother (61 y/o healthy).  - ECHO ordered 5/25/18, EF 65%  - PICC line placed 5/25/18   - initially on hydrea, discontinued on 5/28.   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  - started on induction therapy on 5/29/18.  - Day 27 of 7+3.    -  allopurinol stopped 6/11  - prophylactic antimicrobials: Acyclovir, Voriconazole  - will need CNS Prophylaxis given her elevated WBC (high risk) on admission after count recovery.  - NPM1 +, CEBPA (-), FLT3 (+).  On Midostaurin 50 mg PO BID until Day 14 (held starting 6/8 to 6/11). Stopped when FLAG JENNIFER re-induction started. Restarting Midostaurin at 25 mg bid on day 8 of FLAG JENNIFER induction (6/22)  - day 14 bone marrow biopsy completed 6/11 showing persistent disease with 15% CD 34 positive blasts  - Day 10 of FLAG-JENNIFER, tolerating without difficulty except nausea or vomiting  - repeat 2D echo on 6/15 with preserved EF of 60%  - plan for day 14 restaging bone marrow biopsy on 6/29          Hypophosphatemia    -phos 2.2 today  -replace phos per protocol        Hypokalemia    - K 3.0 today          CINV (chemotherapy-induced nausea and vomiting)    Improved today with Olanzapine.  - continue daily olanzapine  - prn Zofran and Phenergan        Chemotherapy-induced nausea    Pt reports nausea has improved with introduction of Zyprexa. She was able to keep down some solid food today.  - continue Zyprexa daily  - continue PRN Zofran, Compazine and Ativan  - meds IV if possible        Hypocalcemia    - Corrected calcium 8.5 today.  --1g calcium chloride IV x1  - continue Calcium carbonate TID and Vitamin D 5,000 units daily for now  - if Ca normalizes can switch to lower dose Calcium-Vit D supplement        Abnormal liver enzymes    AST and ALT have normalized. Alk phos is still elevated and is up to 1041 today.   - obtain GGT today  - midostaurin starting today at 25 mg bid, will monitor liver enzymes closely  - 6/22/18 liver US showing mild intrahepatic dilation, otherwise impression of hepatic steatosis.  Will hold off of MRCP at this time.  Alk phos 1050 not significant rise from 1024 previously        Hypomagnesemia    Mg 1.7 today.  - Mag oxide replacement per protocol        Pancytopenia    - secondary to  chemotherapy.   - WBC 0.2 and ANC 0, on Neupogen per FLAG JENNIFER protocol  - hgb 7.8 and platelet count 24,000 today  - tranfuse if Hb < 7 g/dL and Plt < 10,000/mm3 or active bleed.             MCTD (mixed connective tissue disease)    - MCTD-NOS  - previously on MTX and Prednisone.   - off therapy now and otherwise doing well.   - can follow up outpatient with her Rheumatologist upon discharge.         Staphylococcus epidermidis bacteremia    - blood cultures from 6/11 at 0430 with gram + cocci, staph epidermis sensitive for Vancomycin, CXR and UA unremarkable.   - Continue vancomycin with projected end date of 6/27  - Vanc at 1250 mg BID, vanc trough 18.9 on 6/20  - surveillance blood cultures repeated 6/13 NGTD  - de-escalated aztreonam back to prophylactic cipro 6/16  - ID consulted on 6/19, rec continue Vanc for 2 weeks post last negative culture (EOT 6/27)  --Last vanc trough 6/22/18 PM = 15.7  - BC from 6/11 with gram negative rods, leptotrichia goodfellowii  - completing Flagyl po x 5 days (EOT 6/24 afternoon)  - ID has signed off        Pulmonary nodule    - CXR suggestive of 7 mm slightly irregular shaped radiodensity projected over the left upper lung zone which could represent a calcified granuloma but remains indeterminate.  - CT Chest on 5/28: Multiple scattered noncalcified pulmonary granulomas, the largest measuring 8 mm in the lateral segment of the right middle lobe.   - repeat non-contrast chest CT at 6-12 months         Insomnia    - PRN nightly Xanax.         HTN (hypertension)    - history of SVT and reports intermittent palpitations at times.  - EKG on 5/28 - NSR.  - increased metoprolol XL to 25 mg QD,she feels better with this dose.  - metoprolol tartrate changed on 6/13 to 25 mg bid (hold for SBP <100) due to hypotension  - BP improved        GERD (gastroesophageal reflux disease)    - takes Omeprazole at home, now on pepcid here  - Protonix added 6/13  - reflux improved         Osteoporosis     - takes prolia once every 6 months, last given mid April 2018   - no acute issue         Depression    - no acute issue   - continue home Escitalopram             VTE Risk Mitigation         Ordered     heparin, porcine (PF) 100 unit/mL injection flush 300 Units  As needed (PRN)      06/15/18 1657     Place MAYLIN hose  Until discontinued      05/25/18 0032     IP VTE HIGH RISK PATIENT  Once      05/25/18 0032          Disposition: inpatient    Alex Chu MD  Bone Marrow Transplant  [facility]

## 2018-06-24 NOTE — PLAN OF CARE
Problem: Patient Care Overview  Goal: Plan of Care Review  Outcome: Ongoing (interventions implemented as appropriate)  Patient reports restless night. Patient sitting up in bed. Reports slight nausea. Denies any discomfort at this time. Patient requiring electrolyte replacements. Encouraged patient to take her scheduled medication when she can. Will recheck levels this afternoon. Afebrile, no falls. Patient remains on neutropenic and thrombocytopenic precautions. Will cont to monitor.

## 2018-06-25 PROBLEM — K21.9 GERD (GASTROESOPHAGEAL REFLUX DISEASE): Status: RESOLVED | Noted: 2018-05-25 | Resolved: 2018-06-25

## 2018-06-25 LAB
ALBUMIN SERPL BCP-MCNC: 2.4 G/DL
ALP SERPL-CCNC: 1030 U/L
ALT SERPL W/O P-5'-P-CCNC: 19 U/L
ANION GAP SERPL CALC-SCNC: 8 MMOL/L
ANISOCYTOSIS BLD QL SMEAR: SLIGHT
AST SERPL-CCNC: 28 U/L
BASOPHILS # BLD AUTO: 0 K/UL
BASOPHILS NFR BLD: 0 %
BILIRUB SERPL-MCNC: 0.8 MG/DL
BUN SERPL-MCNC: 2 MG/DL
CA-I BLDV-SCNC: 1.04 MMOL/L
CALCIUM SERPL-MCNC: 7.5 MG/DL
CHLORIDE SERPL-SCNC: 110 MMOL/L
CO2 SERPL-SCNC: 24 MMOL/L
CREAT SERPL-MCNC: 0.6 MG/DL
DIFFERENTIAL METHOD: ABNORMAL
EOSINOPHIL # BLD AUTO: 0 K/UL
EOSINOPHIL NFR BLD: 0 %
ERYTHROCYTE [DISTWIDTH] IN BLOOD BY AUTOMATED COUNT: 15.9 %
EST. GFR  (AFRICAN AMERICAN): >60 ML/MIN/1.73 M^2
EST. GFR  (NON AFRICAN AMERICAN): >60 ML/MIN/1.73 M^2
GLUCOSE SERPL-MCNC: 87 MG/DL
HCT VFR BLD AUTO: 24.5 %
HGB BLD-MCNC: 8.3 G/DL
IMM GRANULOCYTES # BLD AUTO: 0 K/UL
IMM GRANULOCYTES NFR BLD AUTO: 0 %
LYMPHOCYTES # BLD AUTO: 0 K/UL
LYMPHOCYTES NFR BLD: 100 %
MAGNESIUM SERPL-MCNC: 1.2 MG/DL
MAGNESIUM SERPL-MCNC: 2.1 MG/DL
MCH RBC QN AUTO: 30.4 PG
MCHC RBC AUTO-ENTMCNC: 33.9 G/DL
MCV RBC AUTO: 90 FL
MONOCYTES # BLD AUTO: 0 K/UL
MONOCYTES NFR BLD: 0 %
NEUTROPHILS # BLD AUTO: 0 K/UL
NEUTROPHILS NFR BLD: 0 %
NRBC BLD-RTO: 0 /100 WBC
PHOSPHATE SERPL-MCNC: 2 MG/DL
PHOSPHATE SERPL-MCNC: 2.7 MG/DL
PLATELET # BLD AUTO: 15 K/UL
PLATELET BLD QL SMEAR: ABNORMAL
PMV BLD AUTO: ABNORMAL FL
POTASSIUM SERPL-SCNC: 2.9 MMOL/L
PROT SERPL-MCNC: 5.1 G/DL
RBC # BLD AUTO: 2.73 M/UL
SODIUM SERPL-SCNC: 142 MMOL/L
VANCOMYCIN TROUGH SERPL-MCNC: 19.9 UG/ML
WBC # BLD AUTO: 0.01 K/UL

## 2018-06-25 PROCEDURE — 82330 ASSAY OF CALCIUM: CPT

## 2018-06-25 PROCEDURE — 25000003 PHARM REV CODE 250: Performed by: INTERNAL MEDICINE

## 2018-06-25 PROCEDURE — 85025 COMPLETE CBC W/AUTO DIFF WBC: CPT

## 2018-06-25 PROCEDURE — 25000003 PHARM REV CODE 250: Performed by: NURSE PRACTITIONER

## 2018-06-25 PROCEDURE — 99233 SBSQ HOSP IP/OBS HIGH 50: CPT | Mod: ,,, | Performed by: INTERNAL MEDICINE

## 2018-06-25 PROCEDURE — A4216 STERILE WATER/SALINE, 10 ML: HCPCS | Performed by: INTERNAL MEDICINE

## 2018-06-25 PROCEDURE — 20600001 HC STEP DOWN PRIVATE ROOM

## 2018-06-25 PROCEDURE — 80053 COMPREHEN METABOLIC PANEL: CPT

## 2018-06-25 PROCEDURE — C9113 INJ PANTOPRAZOLE SODIUM, VIA: HCPCS | Performed by: NURSE PRACTITIONER

## 2018-06-25 PROCEDURE — 63600175 PHARM REV CODE 636 W HCPCS: Mod: JG | Performed by: INTERNAL MEDICINE

## 2018-06-25 PROCEDURE — 84100 ASSAY OF PHOSPHORUS: CPT | Mod: 91

## 2018-06-25 PROCEDURE — 63600175 PHARM REV CODE 636 W HCPCS: Performed by: NURSE PRACTITIONER

## 2018-06-25 PROCEDURE — 25000003 PHARM REV CODE 250: Performed by: STUDENT IN AN ORGANIZED HEALTH CARE EDUCATION/TRAINING PROGRAM

## 2018-06-25 PROCEDURE — 80202 ASSAY OF VANCOMYCIN: CPT

## 2018-06-25 PROCEDURE — 63600175 PHARM REV CODE 636 W HCPCS: Performed by: INTERNAL MEDICINE

## 2018-06-25 PROCEDURE — 83735 ASSAY OF MAGNESIUM: CPT | Mod: 91

## 2018-06-25 RX ORDER — POTASSIUM CHLORIDE 14.9 MG/ML
20 INJECTION INTRAVENOUS
Status: COMPLETED | OUTPATIENT
Start: 2018-06-25 | End: 2018-06-25

## 2018-06-25 RX ORDER — VANCOMYCIN HCL IN 5 % DEXTROSE 1G/250ML
1000 PLASTIC BAG, INJECTION (ML) INTRAVENOUS
Status: COMPLETED | OUTPATIENT
Start: 2018-06-25 | End: 2018-06-27

## 2018-06-25 RX ORDER — MAGNESIUM SULFATE HEPTAHYDRATE 40 MG/ML
2 INJECTION, SOLUTION INTRAVENOUS ONCE
Status: DISCONTINUED | OUTPATIENT
Start: 2018-06-25 | End: 2018-06-25

## 2018-06-25 RX ADMIN — FILGRASTIM 480 MCG: 480 INJECTION, SOLUTION INTRAVENOUS; SUBCUTANEOUS at 07:06

## 2018-06-25 RX ADMIN — DEXTROSE 310 MG: 5 SOLUTION INTRAVENOUS at 10:06

## 2018-06-25 RX ADMIN — POTASSIUM CHLORIDE 20 MEQ: 200 INJECTION, SOLUTION INTRAVENOUS at 11:06

## 2018-06-25 RX ADMIN — CIPROFLOXACIN 400 MG: 2 INJECTION, SOLUTION INTRAVENOUS at 08:06

## 2018-06-25 RX ADMIN — ACYCLOVIR SODIUM 200 MG: 50 INJECTION, SOLUTION INTRAVENOUS at 08:06

## 2018-06-25 RX ADMIN — MAGNESIUM SULFATE IN WATER 2 G: 40 INJECTION, SOLUTION INTRAVENOUS at 07:06

## 2018-06-25 RX ADMIN — Medication 10 ML: at 11:06

## 2018-06-25 RX ADMIN — VANCOMYCIN HYDROCHLORIDE 1000 MG: 1 INJECTION, POWDER, LYOPHILIZED, FOR SOLUTION INTRAVENOUS at 10:06

## 2018-06-25 RX ADMIN — OLANZAPINE 5 MG: 5 TABLET, ORALLY DISINTEGRATING ORAL at 08:06

## 2018-06-25 RX ADMIN — METOPROLOL TARTRATE 25 MG: 25 TABLET, FILM COATED ORAL at 08:06

## 2018-06-25 RX ADMIN — ALPRAZOLAM 0.25 MG: 0.25 TABLET ORAL at 08:06

## 2018-06-25 RX ADMIN — POTASSIUM CHLORIDE 20 MEQ: 200 INJECTION, SOLUTION INTRAVENOUS at 05:06

## 2018-06-25 RX ADMIN — DEXTROSE 40 MG: 50 INJECTION, SOLUTION INTRAVENOUS at 08:06

## 2018-06-25 RX ADMIN — VITAMIN D, TAB 1000IU (100/BT) 5000 UNITS: 25 TAB at 08:06

## 2018-06-25 RX ADMIN — Medication 800 MG: at 08:06

## 2018-06-25 RX ADMIN — VANCOMYCIN HYDROCHLORIDE 1000 MG: 1 INJECTION, POWDER, LYOPHILIZED, FOR SOLUTION INTRAVENOUS at 09:06

## 2018-06-25 RX ADMIN — POTASSIUM CHLORIDE 20 MEQ: 200 INJECTION, SOLUTION INTRAVENOUS at 02:06

## 2018-06-25 RX ADMIN — ESCITALOPRAM 10 MG: 5 TABLET, FILM COATED ORAL at 08:06

## 2018-06-25 RX ADMIN — SODIUM CHLORIDE: 0.9 INJECTION, SOLUTION INTRAVENOUS at 04:06

## 2018-06-25 RX ADMIN — POTASSIUM CHLORIDE 20 MEQ: 200 INJECTION, SOLUTION INTRAVENOUS at 08:06

## 2018-06-25 RX ADMIN — LORAZEPAM 0.5 MG: 2 INJECTION INTRAMUSCULAR; INTRAVENOUS at 01:06

## 2018-06-25 RX ADMIN — MAGNESIUM SULFATE IN WATER 2 G: 40 INJECTION, SOLUTION INTRAVENOUS at 10:06

## 2018-06-25 RX ADMIN — DEXTROSE 310 MG: 5 SOLUTION INTRAVENOUS at 08:06

## 2018-06-25 RX ADMIN — SODIUM PHOSPHATE, MONOBASIC, MONOHYDRATE 20.01 MMOL: 276; 142 INJECTION, SOLUTION INTRAVENOUS at 11:06

## 2018-06-25 NOTE — ASSESSMENT & PLAN NOTE
- blood cultures from 6/11 at 0430 with gram + cocci, staph epidermis sensitive for Vancomycin, CXR and UA unremarkable.   - Continue vancomycin with projected end date of 6/27  - Vanc at 1250 mg BID, vanc trough 19.9 this am, will decrease to 1000 mg bid  - surveillance blood cultures repeated 6/13 NGTD  - de-escalated aztreonam back to prophylactic cipro 6/16  - ID consulted on 6/19, rec continue Vanc for 2 weeks post last negative culture (EOT 6/27)  - BC from 6/11 with gram negative rods, leptotrichia goodfellowii  - completed Flagyl po x 5 days (EOT 6/24)  - ID has signed off

## 2018-06-25 NOTE — SUBJECTIVE & OBJECTIVE
Subjective:     Interval History:   Day 28 of 7+3 and Day 11 of FLAG JENNIFER. Afebrile, ANC 0. Has completed Flagyl. Will decrease Vanc to 1000 mg q12, trough 19.9, will complete Vanc on 6/27. Liver enzymes stable on Midostaurin. VSS. Complains of nausea, no vomiting. No diarrhea. No c/o pain or mouth sores.     Objective:     Vital Signs (Most Recent):  Temp: 98 °F (36.7 °C) (06/25/18 0839)  Pulse: 75 (06/25/18 0839)  Resp: 17 (06/25/18 0839)  BP: 135/64 (06/25/18 0839)  SpO2: 96 % (06/25/18 0839) Vital Signs (24h Range):  Temp:  [98 °F (36.7 °C)-99.2 °F (37.3 °C)] 98 °F (36.7 °C)  Pulse:  [58-78] 75  Resp:  [14-20] 17  SpO2:  [92 %-97 %] 96 %  BP: (121-156)/(59-70) 135/64     Weight: 70.2 kg (154 lb 12.2 oz)  Body mass index is 27.42 kg/m².  Body surface area is 1.77 meters squared.      Intake/Output - Last 3 Shifts       06/23 0700 - 06/24 0659 06/24 0700 - 06/25 0659 06/25 0700 - 06/26 0659    P.O. 480 1480 240    I.V. (mL/kg) 2446.8 (34.3) 2416.7 (34.4) 816.7 (11.6)    Blood 567      IV Piggyback 2150 1700 700    Total Intake(mL/kg) 5643.8 (79.2) 5596.7 (79.7) 1756.7 (25)    Urine (mL/kg/hr) 5900 (3.4) 5950 (3.5)     Total Output 5900 5950      Net -256.2 -353.3 +1756.7           Urine Occurrence 1 x      Stool Occurrence 1 x 0 x           Physical Exam   Constitutional: She is oriented to person, place, and time. She appears well-developed and well-nourished. No distress.   HENT:   Mouth/Throat: Oropharynx is clear and moist. No oropharyngeal exudate or posterior oropharyngeal erythema.   Eyes: Conjunctivae are normal. Pupils are equal, round, and reactive to light. No scleral icterus.   Neck: Normal range of motion. Neck supple. No thyromegaly present.   Cardiovascular: Normal rate, regular rhythm, normal heart sounds and intact distal pulses.    Pulmonary/Chest: Effort normal and breath sounds normal. No respiratory distress.   Abdominal: Soft. Bowel sounds are normal. She exhibits no distension. There is no  splenomegaly or hepatomegaly. There is no tenderness.   Musculoskeletal: Normal range of motion. She exhibits no edema or tenderness.   Neurological: She is alert and oriented to person, place, and time. No cranial nerve deficit. Coordination normal.   Skin: No rash noted. No cyanosis. No pallor. Nails show no clubbing.   PICC intact R arm no redness or drainage   Psychiatric: She has a normal mood and affect. Thought content normal.   Vitals reviewed.      Significant Labs:   CBC:   Recent Labs  Lab 06/24/18  0400 06/25/18  0447   WBC 0.02* 0.01*   HGB 7.8* 8.3*   HCT 23.6* 24.5*   PLT 24* 15*    and CMP:   Recent Labs  Lab 06/24/18  0400 06/25/18  0447    142   K 3.0* 2.9*   * 110   CO2 23 24   GLU 91 87   BUN 3* 2*   CREATININE 0.5 0.6   CALCIUM 7.1* 7.5*   PROT 5.0* 5.1*   ALBUMIN 2.3* 2.4*   BILITOT 0.9 0.8   ALKPHOS 1,050* 1,030*   AST 39 28   ALT 23 19   ANIONGAP 8 8   EGFRNONAA >60.0 >60.0       Diagnostic Results:  None

## 2018-06-25 NOTE — ASSESSMENT & PLAN NOTE
AST and ALT have normalized. Alk phos stable at 1031 today.   -   - midostaurin starting today at 25 mg bid, will monitor liver enzymes closely  - 6/22/18 liver US showing mild intrahepatic dilation, otherwise impression of hepatic steatosis.  Will hold off of MRCP at this time.

## 2018-06-25 NOTE — ASSESSMENT & PLAN NOTE
Improved today with Olanzapine.  - continue olanzapine, will complete today  - prn Zofran and Ativan

## 2018-06-25 NOTE — PROGRESS NOTES
SW met with pt at bedside to provide support. SW will continue to follow.    Radha Urias [facility]  Oncology Social Worker  Phone: [phone]

## 2018-06-25 NOTE — ASSESSMENT & PLAN NOTE
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 Ordered routine labs in addition to FLT3, NGS, and AML FISH.  - lo res HLA typing on 5/26/18 and hi res on 5/27/18 done in anticipation of possible need for future transplant   - Pt with two daughters, two full sisters (in their mid 60s, one with brain aneurysm hx, other one without any medical issues), and one full brother (61 y/o healthy).  - ECHO ordered 5/25/18, EF 65%  - PICC line placed 5/25/18   - initially on hydrea, discontinued on 5/28.   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  - started on induction therapy on 5/29/18.  - Day 28 of 7+3.    - allopurinol stopped 6/11  - prophylactic antimicrobials: Acyclovir, Voriconazole  - will need CNS Prophylaxis given her elevated WBC (high risk) on admission after count recovery.  - NPM1 +, CEBPA (-), FLT3 (+).  On Midostaurin 50 mg PO BID until Day 14 (held starting 6/8 to 6/11). Stopped when FLAG JENNIFER re-induction started. Restarting Midostaurin at 25 mg bid on day 8 of FLAG JENNIFER induction (6/22)  - day 14 bone marrow biopsy completed 6/11 showing persistent disease with 15% CD 34 positive blasts  - Day 11 of FLAG-JENNIFER, tolerating without difficulty except nausea   - repeat 2D echo on 6/15 with preserved EF of 60%  - plan for day 14 restaging bone marrow biopsy on 6/29

## 2018-06-25 NOTE — PLAN OF CARE
Problem: Patient Care Overview  Goal: Plan of Care Review  Plan of care reviewed with the patient at the beginning of the shift. Pt admitted for newly diagnosed AML. She is s/p 7&3 and FLAG-JENNIFER. She has complaints of nausea and diarrhea. No emesis or bm tonight. Nausea managed with Zofran and Ativan. Small bland meals encouraged. Pt using the BSC due to generalized weakness and fatigue. Fall precautions maintained. Daughter remained at the bedside and very attentive to care. Pt remained free from falls and injury this shift. Bed locked in lowest position, side rails up x2, call light within reach. Instructed pt to call for assistance as needed. Vitals stable. Pt afebrile. Neutropenic precautions maintained. No acute issues overnight. Will continue to monitor.

## 2018-06-25 NOTE — ASSESSMENT & PLAN NOTE
- secondary to chemotherapy.   - WBC 0.1 and ANC 0, on Neupogen per FLAG JENNIFER protocol  - hgb 8.3 and platelet count 15,000 today  - tranfuse if Hb < 7 g/dL and Plt < 10,000/mm3 or active bleed.

## 2018-06-25 NOTE — ASSESSMENT & PLAN NOTE
Pt reports nausea has improved with introduction of Zyprexa.   - continue Zyprexa daily (will end today)  - continue PRN Zofran, Compazine and Ativan  - meds IV if possible

## 2018-06-25 NOTE — PT/OT/SLP PROGRESS
Physical Therapy      Patient Name:  Noreen Mac   MRN:  [id number]    Patient seen ambulating in hallway with her daughter with neutropenic precautions maintained. Pt denied acute PT needs/concerns at this time. Pt reports that she has been ambulating daily with family assist without significant declines in mobility. Pt instructed to contact team if therapy needs arise prior to next PT visit. Pt v/u. Pt left to continue ambulating in hallway. Will continue to follow. No billable units this date.     Adrianne Jerez, PT, DPT   6/25/2018  [phone]

## 2018-06-25 NOTE — PLAN OF CARE
Problem: Patient Care Overview  Goal: Plan of Care Review  Outcome: Ongoing (interventions implemented as appropriate)  Pt involved in plan of care and communicating needs throughout shift.  Family at bedside this afternoon and involved in care.  Pt up to BSC independently; ambulated in hallway x 1 with family; generalized weakness noted.  No c/o pain or discomfort today.  Tolerating regular diet with improved appetite, voiding without difficulty.  Pt has one episode of nausea/vomiting this afternoon following lunch; prn ativan admin with good effect.  IV antibiotics admin as ordered.  Pt received a total of 80mEq KCl, 4g mag sulfate, and 20mmol Naphos IV for K=2.9, mg=1.2, ph=2.0.  Neutropenic precautions maintained for WBC=0.01; bleeding precautions maintained for plt=15.  All VSS; no acute events so far this shift.  Pt remaining free from falls or injury throughout shift; bed in lowest position; call light within reach.  Pt instructed to call for assistance as needed.  Q1H rounding done on pt.

## 2018-06-25 NOTE — PROGRESS NOTES
[facility]  Hematology  Bone Marrow Transplant  Progress Note    Patient Name: Noreen Mac  Admission Date: 5/24/2018  Hospital Length of Stay: 32 days  Code Status: Full Code    Subjective:     Interval History:   Day 28 of 7+3 and Day 11 of FLAG JENNIFER. Afebrile, ANC 0. Has completed Flagyl. Will decrease Vanc to 1000 mg q12, trough 19.9, will complete Vanc on 6/27. Liver enzymes stable on Midostaurin. VSS. Complains of nausea, no vomiting. No diarrhea. No c/o pain or mouth sores.     Objective:     Vital Signs (Most Recent):  Temp: 98 °F (36.7 °C) (06/25/18 0839)  Pulse: 75 (06/25/18 0839)  Resp: 17 (06/25/18 0839)  BP: 135/64 (06/25/18 0839)  SpO2: 96 % (06/25/18 0839) Vital Signs (24h Range):  Temp:  [98 °F (36.7 °C)-99.2 °F (37.3 °C)] 98 °F (36.7 °C)  Pulse:  [58-78] 75  Resp:  [14-20] 17  SpO2:  [92 %-97 %] 96 %  BP: (121-156)/(59-70) 135/64     Weight: 70.2 kg (154 lb 12.2 oz)  Body mass index is 27.42 kg/m².  Body surface area is 1.77 meters squared.      Intake/Output - Last 3 Shifts       06/23 0700 - 06/24 0659 06/24 0700 - 06/25 0659 06/25 0700 - 06/26 0659    P.O. 480 1480 240    I.V. (mL/kg) 2446.8 (34.3) 2416.7 (34.4) 816.7 (11.6)    Blood 567      IV Piggyback 2150 1700 700    Total Intake(mL/kg) 5643.8 (79.2) 5596.7 (79.7) 1756.7 (25)    Urine (mL/kg/hr) 5900 (3.4) 5950 (3.5)     Total Output 5900 5950      Net -256.2 -353.3 +1756.7           Urine Occurrence 1 x      Stool Occurrence 1 x 0 x           Physical Exam   Constitutional: She is oriented to person, place, and time. She appears well-developed and well-nourished. No distress.   HENT:   Mouth/Throat: Oropharynx is clear and moist. No oropharyngeal exudate or posterior oropharyngeal erythema.   Eyes: Conjunctivae are normal. Pupils are equal, round, and reactive to light. No scleral icterus.   Neck: Normal range of motion. Neck supple. No thyromegaly present.   Cardiovascular: Normal rate, regular rhythm, normal heart  sounds and intact distal pulses.    Pulmonary/Chest: Effort normal and breath sounds normal. No respiratory distress.   Abdominal: Soft. Bowel sounds are normal. She exhibits no distension. There is no splenomegaly or hepatomegaly. There is no tenderness.   Musculoskeletal: Normal range of motion. She exhibits no edema or tenderness.   Neurological: She is alert and oriented to person, place, and time. No cranial nerve deficit. Coordination normal.   Skin: No rash noted. No cyanosis. No pallor. Nails show no clubbing.   PICC intact R arm no redness or drainage   Psychiatric: She has a normal mood and affect. Thought content normal.   Vitals reviewed.      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 Ordered routine labs in addition to FLT3, NGS, and AML FISH.  - lo res HLA typing on 5/26/18 and hi res on 5/27/18 done in anticipation of possible need for future transplant   - Pt with two daughters, two full sisters (in their mid 60s, one with brain aneurysm hx, other one without any medical issues), and one full brother (61 y/o healthy).  - ECHO ordered 5/25/18, EF 65%  - PICC line placed 5/25/18   - initially on hydrea, discontinued on 5/28.   - path MOST COMPATIBLE WITH NON-M3 ACUTE MYELOID  LEUKEMIA.  - started on induction therapy on 5/29/18.  - Day 28 of 7+3.    - allopurinol stopped 6/11  - prophylactic antimicrobials: Acyclovir, Voriconazole  - will need CNS Prophylaxis given her elevated WBC (high risk) on admission after count recovery.  - NPM1 +, CEBPA (-), FLT3 (+).  On Midostaurin 50 mg PO BID until Day 14 (held starting 6/8 to 6/11). Stopped when FLAG JENNIFER re-induction started. Restarting Midostaurin at 25 mg bid on day 8 of FLAG JENNIFER induction (6/22)  - day 14 bone marrow biopsy completed 6/11 showing persistent disease with 15% CD 34 positive blasts  - Day 11 of FLAG-JENNIFER, tolerating without difficulty except nausea   - repeat 2D echo on 6/15 with preserved EF of 60%  - plan for day 14 restaging bone marrow biopsy on 6/29          Staphylococcus epidermidis bacteremia    - blood cultures from 6/11 at 0430 with gram + cocci, staph epidermis sensitive for Vancomycin, CXR and UA unremarkable.   - Continue vancomycin with projected end date of 6/27  - Vanc at 1250 mg BID, vanc trough 19.9 this am, will decrease to 1000 mg bid  - surveillance blood cultures repeated 6/13 NGTD  - de-escalated aztreonam back to prophylactic cipro 6/16  - ID consulted on 6/19, rec continue Vanc for 2 weeks post last negative culture (EOT 6/27)  - BC from 6/11 with gram negative rods, leptotrichia goodfellowii  - completed Flagyl po x 5 days (EOT 6/24)  - ID has signed off        Pancytopenia    - secondary to chemotherapy.   - WBC 0.1 and ANC 0, on Neupogen per FLAG JENNIFER protocol  - hgb 8.3 and platelet count 15,000 today  - tranfuse if Hb < 7 g/dL and Plt < 10,000/mm3 or active bleed.             Abnormal liver enzymes    AST and ALT have normalized. Alk phos stable at 1031 today.   -   - midostaurin starting today at 25 mg bid, will monitor liver enzymes closely  - 6/22/18 liver US showing mild intrahepatic dilation, otherwise impression of hepatic steatosis.  Will hold off of MRCP at this time.           Hypophosphatemia    -phos 2.0 today  -replace phos per protocol        Hypokalemia    - K 2.9 today  - will replace with 80 meq IV          CINV (chemotherapy-induced nausea and vomiting)    Improved today with Olanzapine.  - continue olanzapine, will complete today  - prn Zofran and Ativan        Hypocalcemia    - Corrected calcium 8.8 today.  --1g calcium chloride given PRN  - continue Calcium carbonate TID and Vitamin D 5,000 units daily for now, patient has been refusing due to nausea  - if Ca normalizes can switch to lower dose Calcium-Vit D supplement        Hypomagnesemia    Mg 1.2 today.  - Mag oxide replacement per protocol (4gm IV)        MCTD (mixed connective tissue disease)    - MCTD-NOS  - previously on MTX and Prednisone.   - off therapy now and otherwise doing well.   - can follow up outpatient with her Rheumatologist upon discharge.         Pulmonary nodule    - CXR suggestive of 7 mm slightly irregular shaped radiodensity projected over the left upper lung zone which could represent a calcified granuloma but remains indeterminate.  - CT Chest on 5/28: Multiple scattered noncalcified pulmonary granulomas, the largest measuring 8 mm in the lateral segment of the right middle lobe.   - repeat non-contrast chest CT at 6-12 months         Insomnia    - PRN nightly Xanax.         HTN (hypertension)    - history of SVT and reports intermittent palpitations at times.  - EKG on 5/28 - NSR.  - increased metoprolol XL to 25 mg QD,she feels better with this dose.  - metoprolol tartrate changed on 6/13 to 25 mg bid (hold for SBP <100) due to hypotension  - BP improved        Osteoporosis    - takes prolia once every 6 months, last given mid April 2018   - no acute issue         Depression    - no acute issue   - continue home Escitalopram             VTE Risk Mitigation         Ordered     heparin, porcine (PF) 100 unit/mL injection flush 300 Units  As needed (PRN)      06/15/18 2342     Place MAYLIN hose  Until  discontinued      05/25/18 0032     IP VTE HIGH RISK PATIENT  Once      05/25/18 0032          Disposition: pending day 14 bone marrow biopsy and count recovery    Sweetie Lepe NP  Bone Marrow Transplant  [facility]

## 2018-06-26 LAB
ALBUMIN SERPL BCP-MCNC: 2.4 G/DL
ALP SERPL-CCNC: 893 U/L
ALT SERPL W/O P-5'-P-CCNC: 15 U/L
ANION GAP SERPL CALC-SCNC: 5 MMOL/L
ANISOCYTOSIS BLD QL SMEAR: SLIGHT
AST SERPL-CCNC: 19 U/L
BASOPHILS # BLD AUTO: ABNORMAL K/UL
BASOPHILS NFR BLD: 0 %
BILIRUB SERPL-MCNC: 0.6 MG/DL
BLD PROD TYP BPU: NORMAL
BLOOD UNIT EXPIRATION DATE: NORMAL
BLOOD UNIT TYPE CODE: 6200
BLOOD UNIT TYPE: NORMAL
BUN SERPL-MCNC: 3 MG/DL
CA-I BLDV-SCNC: 1.04 MMOL/L
CALCIUM SERPL-MCNC: 7.4 MG/DL
CHLORIDE SERPL-SCNC: 111 MMOL/L
CO2 SERPL-SCNC: 24 MMOL/L
CODING SYSTEM: NORMAL
CREAT SERPL-MCNC: 0.6 MG/DL
DIFFERENTIAL METHOD: ABNORMAL
DISPENSE STATUS: NORMAL
EOSINOPHIL # BLD AUTO: ABNORMAL K/UL
EOSINOPHIL NFR BLD: 0 %
ERYTHROCYTE [DISTWIDTH] IN BLOOD BY AUTOMATED COUNT: 15.9 %
EST. GFR  (AFRICAN AMERICAN): >60 ML/MIN/1.73 M^2
EST. GFR  (NON AFRICAN AMERICAN): >60 ML/MIN/1.73 M^2
GLUCOSE SERPL-MCNC: 93 MG/DL
HCT VFR BLD AUTO: 23 %
HGB BLD-MCNC: 7.6 G/DL
HYPOCHROMIA BLD QL SMEAR: ABNORMAL
IMM GRANULOCYTES # BLD AUTO: ABNORMAL K/UL
IMM GRANULOCYTES NFR BLD AUTO: ABNORMAL %
LYMPHOCYTES # BLD AUTO: ABNORMAL K/UL
LYMPHOCYTES NFR BLD: 100 %
MAGNESIUM SERPL-MCNC: 1.6 MG/DL
MAGNESIUM SERPL-MCNC: 1.6 MG/DL
MCH RBC QN AUTO: 30.2 PG
MCHC RBC AUTO-ENTMCNC: 33 G/DL
MCV RBC AUTO: 91 FL
MONOCYTES # BLD AUTO: ABNORMAL K/UL
MONOCYTES NFR BLD: 0 %
NEUTROPHILS NFR BLD: 0 %
NRBC BLD-RTO: 0 /100 WBC
NUM UNITS TRANS WBC-POOR PLATPHERESIS: NORMAL
PHOSPHATE SERPL-MCNC: 1.9 MG/DL
PHOSPHATE SERPL-MCNC: 2 MG/DL
PLATELET # BLD AUTO: 7 K/UL
PLATELET BLD QL SMEAR: ABNORMAL
PMV BLD AUTO: 11.2 FL
POTASSIUM SERPL-SCNC: 3.7 MMOL/L
PROT SERPL-MCNC: 5.1 G/DL
RBC # BLD AUTO: 2.52 M/UL
SODIUM SERPL-SCNC: 140 MMOL/L
VANCOMYCIN TROUGH SERPL-MCNC: 13.7 UG/ML
WBC # BLD AUTO: 0.02 K/UL

## 2018-06-26 PROCEDURE — 85027 COMPLETE CBC AUTOMATED: CPT

## 2018-06-26 PROCEDURE — 63600175 PHARM REV CODE 636 W HCPCS: Performed by: INTERNAL MEDICINE

## 2018-06-26 PROCEDURE — 80053 COMPREHEN METABOLIC PANEL: CPT

## 2018-06-26 PROCEDURE — 20600001 HC STEP DOWN PRIVATE ROOM

## 2018-06-26 PROCEDURE — 25000003 PHARM REV CODE 250: Performed by: STUDENT IN AN ORGANIZED HEALTH CARE EDUCATION/TRAINING PROGRAM

## 2018-06-26 PROCEDURE — 25000003 PHARM REV CODE 250: Performed by: NURSE PRACTITIONER

## 2018-06-26 PROCEDURE — 85007 BL SMEAR W/DIFF WBC COUNT: CPT

## 2018-06-26 PROCEDURE — P9037 PLATE PHERES LEUKOREDU IRRAD: HCPCS

## 2018-06-26 PROCEDURE — 63600175 PHARM REV CODE 636 W HCPCS: Performed by: NURSE PRACTITIONER

## 2018-06-26 PROCEDURE — 80202 ASSAY OF VANCOMYCIN: CPT

## 2018-06-26 PROCEDURE — 83735 ASSAY OF MAGNESIUM: CPT | Mod: 91

## 2018-06-26 PROCEDURE — 82330 ASSAY OF CALCIUM: CPT

## 2018-06-26 PROCEDURE — 25000003 PHARM REV CODE 250: Performed by: INTERNAL MEDICINE

## 2018-06-26 PROCEDURE — 84100 ASSAY OF PHOSPHORUS: CPT | Mod: 91

## 2018-06-26 PROCEDURE — C9113 INJ PANTOPRAZOLE SODIUM, VIA: HCPCS | Performed by: NURSE PRACTITIONER

## 2018-06-26 PROCEDURE — 99233 SBSQ HOSP IP/OBS HIGH 50: CPT | Mod: ,,, | Performed by: INTERNAL MEDICINE

## 2018-06-26 RX ORDER — HYDROCODONE BITARTRATE AND ACETAMINOPHEN 500; 5 MG/1; MG/1
TABLET ORAL
Status: DISCONTINUED | OUTPATIENT
Start: 2018-06-26 | End: 2018-06-27

## 2018-06-26 RX ADMIN — MAGNESIUM SULFATE IN WATER 2 G: 40 INJECTION, SOLUTION INTRAVENOUS at 05:06

## 2018-06-26 RX ADMIN — CIPROFLOXACIN 400 MG: 2 INJECTION, SOLUTION INTRAVENOUS at 09:06

## 2018-06-26 RX ADMIN — ESCITALOPRAM 10 MG: 5 TABLET, FILM COATED ORAL at 09:06

## 2018-06-26 RX ADMIN — DEXTROSE 310 MG: 5 SOLUTION INTRAVENOUS at 09:06

## 2018-06-26 RX ADMIN — DEXTROSE 40 MG: 50 INJECTION, SOLUTION INTRAVENOUS at 09:06

## 2018-06-26 RX ADMIN — ACETAMINOPHEN 650 MG: 325 TABLET, FILM COATED ORAL at 11:06

## 2018-06-26 RX ADMIN — LORAZEPAM 0.5 MG: 2 INJECTION INTRAMUSCULAR; INTRAVENOUS at 03:06

## 2018-06-26 RX ADMIN — SODIUM CHLORIDE: 0.9 INJECTION, SOLUTION INTRAVENOUS at 01:06

## 2018-06-26 RX ADMIN — SODIUM PHOSPHATE, MONOBASIC, MONOHYDRATE 20.01 MMOL: 276; 142 INJECTION, SOLUTION INTRAVENOUS at 09:06

## 2018-06-26 RX ADMIN — VANCOMYCIN HYDROCHLORIDE 1000 MG: 1 INJECTION, POWDER, LYOPHILIZED, FOR SOLUTION INTRAVENOUS at 10:06

## 2018-06-26 RX ADMIN — ALPRAZOLAM 0.25 MG: 0.25 TABLET ORAL at 09:06

## 2018-06-26 RX ADMIN — METOPROLOL TARTRATE 25 MG: 25 TABLET, FILM COATED ORAL at 09:06

## 2018-06-26 RX ADMIN — Medication 800 MG: at 08:06

## 2018-06-26 RX ADMIN — ACYCLOVIR SODIUM 200 MG: 50 INJECTION, SOLUTION INTRAVENOUS at 08:06

## 2018-06-26 RX ADMIN — VITAMIN D, TAB 1000IU (100/BT) 5000 UNITS: 25 TAB at 09:06

## 2018-06-26 RX ADMIN — FILGRASTIM 480 MCG: 480 INJECTION, SOLUTION INTRAVENOUS; SUBCUTANEOUS at 07:06

## 2018-06-26 RX ADMIN — ACYCLOVIR SODIUM 200 MG: 50 INJECTION, SOLUTION INTRAVENOUS at 09:06

## 2018-06-26 RX ADMIN — LORAZEPAM 0.5 MG: 2 INJECTION INTRAMUSCULAR; INTRAVENOUS at 07:06

## 2018-06-26 RX ADMIN — LORAZEPAM 0.5 MG: 2 INJECTION INTRAMUSCULAR; INTRAVENOUS at 01:06

## 2018-06-26 RX ADMIN — DEXTROSE 310 MG: 5 SOLUTION INTRAVENOUS at 08:06

## 2018-06-26 RX ADMIN — ONDANSETRON 8 MG: 2 INJECTION INTRAMUSCULAR; INTRAVENOUS at 07:06

## 2018-06-26 RX ADMIN — SODIUM CHLORIDE: 0.9 INJECTION, SOLUTION INTRAVENOUS at 03:06

## 2018-06-26 RX ADMIN — SODIUM PHOSPHATE, MONOBASIC, MONOHYDRATE 20.01 MMOL: 276; 142 INJECTION, SOLUTION INTRAVENOUS at 04:06

## 2018-06-26 RX ADMIN — Medication 800 MG: at 09:06

## 2018-06-26 RX ADMIN — METOPROLOL TARTRATE 25 MG: 25 TABLET, FILM COATED ORAL at 08:06

## 2018-06-26 NOTE — ASSESSMENT & PLAN NOTE
- blood cultures from 6/11 at 0430 with gram + cocci, staph epidermis sensitive for Vancomycin, CXR and UA unremarkable.   - Continue vancomycin with projected end date of 6/27  - Vanc at 1250 mg BID, last vanc trough 19.9, decreased to 1000 mg bid (6/25)  - surveillance blood cultures repeated 6/13 NGTD  - de-escalated aztreonam back to prophylactic cipro 6/16  - ID consulted on 6/19, rec continue Vanc for 2 weeks post last negative culture (EOT 6/27)  - BC from 6/11 with gram negative rods, leptotrichia goodfellowii  - completed Flagyl po x 5 days (EOT 6/24)  - ID has signed off

## 2018-06-26 NOTE — PLAN OF CARE
MDR's with Dr Guerrier.  Patient is day 12 of FLAG-Maribel reinduction.  ANC 0.  Next BMB planned for Friday.  Will continue to follow for d/c needs.

## 2018-06-26 NOTE — PLAN OF CARE
Problem: Patient Care Overview  Goal: Plan of Care Review  Outcome: Ongoing (interventions implemented as appropriate)  Patient remains free from falls and injury this shift. Bed in low, locked position with call bell in reach. Patient encouraged to call for assistance when getting out of bed. Patient verbalized understanding. All belongings within reach. Afebrile - maintained on IV Vanc, voriconazole, cipro, & acyclovir. Patient denied any nausea overnight. Neutropenic and thrombocytopenic precautions maintained. IVFs maintained. Xanax given for reports of anxiety. will continue to monitor.

## 2018-06-26 NOTE — PLAN OF CARE
Problem: Patient Care Overview  Goal: Plan of Care Review  Outcome: Ongoing (interventions implemented as appropriate)  POC reviewed with patient; understanding verbalized. 1U platelets transfused this shift. Magnesium and NaPhos replacements administered. Pt C/O anxiety and received PRN Xanax;she had one episode of emesis and received PRN Ativan with full relief. Regular diet with decreased appetite. Voids clear, yellow urine via the BSC; no noted BM this shift. NS at 100mL. Daughter to remain at bedside. Pt. with nonskid footwear on, bed in lowest position, and locked with bed rails up x2.  Pt. has call light and personal items within reach. Patient ambulates in room independently. VSS and afebrile this shift. All questions and concerns address at this time. Will continue to monitor.

## 2018-06-26 NOTE — ASSESSMENT & PLAN NOTE
- Corrected calcium 8.7 today.  --patient refusing scheduled calcium replacement, will D/C calcium today

## 2018-06-26 NOTE — ASSESSMENT & PLAN NOTE
- secondary to chemotherapy.   - WBC 0.2 and ANC 0, on Neupogen per FLAG JENNIFER protocol  - hgb 7.6 and platelet count 7,000 today  - tranfuse if Hb < 7 g/dL and Plt < 10,000/mm3 or active bleed.  - will transfuse 1 unit of platelets today

## 2018-06-26 NOTE — SUBJECTIVE & OBJECTIVE
Subjective:     Interval History:   Day 29 of 7+3 and day 12 of FLAG JENNIFER. Liver enzymes improved and Midostaurin increased to full dose 50 mg bid. Nausea controlled, afebrile, VSS, NEON.    Objective:     Vital Signs (Most Recent):  Temp: 98.7 °F (37.1 °C) (06/26/18 1240)  Pulse: 73 (06/26/18 1240)  Resp: 18 (06/26/18 1240)  BP: (!) 141/68 (06/26/18 1240)  SpO2: 97 % (06/26/18 1240) Vital Signs (24h Range):  Temp:  [97.6 °F (36.4 °C)-98.8 °F (37.1 °C)] 98.7 °F (37.1 °C)  Pulse:  [69-83] 73  Resp:  [16-20] 18  SpO2:  [94 %-97 %] 97 %  BP: (123-144)/(60-70) 141/68     Weight: 71 kg (156 lb 8.4 oz)  Body mass index is 27.73 kg/m².  Body surface area is 1.78 meters squared.    Intake/Output - Last 3 Shifts       06/24 0700 - 06/25 0659 06/25 0700 - 06/26 0659 06/26 0700 - 06/27 0659    P.O. 1480 660 405    I.V. (mL/kg) 2416.7 (34.4) 1616.7 (22.8) 1902.7 (26.8)    Blood   217    IV Piggyback 1700 1250 850    Total Intake(mL/kg) 5596.7 (79.7) 3526.7 (49.7) 3374.7 (47.5)    Urine (mL/kg/hr) 5950 (3.5) 4825 (2.8) 450 (0.9)    Emesis/NG output  200 (0.1) 250 (0.5)    Total Output 5950 5025 700    Net -353.3 -1498.3 +2674.7           Stool Occurrence 0 x 0 x     Emesis Occurrence  1 x           Physical Exam   Constitutional: She is oriented to person, place, and time. She appears well-developed and well-nourished. No distress.   HENT:   Mouth/Throat: Oropharynx is clear and moist. No oropharyngeal exudate or posterior oropharyngeal erythema.   Eyes: Conjunctivae are normal. Pupils are equal, round, and reactive to light. No scleral icterus.   Neck: Normal range of motion. Neck supple. No thyromegaly present.   Cardiovascular: Normal rate, regular rhythm, normal heart sounds and intact distal pulses.    Pulmonary/Chest: Effort normal and breath sounds normal. No respiratory distress.   Abdominal: Soft. Bowel sounds are normal. She exhibits no distension. There is no splenomegaly or hepatomegaly. There is no tenderness.    Musculoskeletal: Normal range of motion. She exhibits no edema or tenderness.   Neurological: She is alert and oriented to person, place, and time. No cranial nerve deficit. Coordination normal.   Skin: No rash noted. No cyanosis. No pallor. Nails show no clubbing.   PICC intact R arm no redness or drainage   Psychiatric: She has a normal mood and affect. Thought content normal.   Vitals reviewed.      Significant Labs:   CBC:   Recent Labs  Lab 06/25/18 0447 06/26/18 0317   WBC 0.01* 0.02*   HGB 8.3* 7.6*   HCT 24.5* 23.0*   PLT 15* 7*    and CMP:   Recent Labs  Lab 06/25/18 0447 06/26/18 0317    140   K 2.9* 3.7    111*   CO2 24 24   GLU 87 93   BUN 2* 3*   CREATININE 0.6 0.6   CALCIUM 7.5* 7.4*   PROT 5.1* 5.1*   ALBUMIN 2.4* 2.4*   BILITOT 0.8 0.6   ALKPHOS 1,030* 893*   AST 28 19   ALT 19 15   ANIONGAP 8 5*   EGFRNONAA >60.0 >60.0       Diagnostic Results:  None

## 2018-06-26 NOTE — ASSESSMENT & PLAN NOTE
AST and ALT have normalized. Alk phos stable at 1031 today.   -   - midostaurin started day 8 at 25 mg bid, monitoring liver enzymes closely and improved. Increasing Midostaurin to 50 mg bid today  - 6/22/18 liver US showing mild intrahepatic dilation, otherwise impression of hepatic steatosis.  Will hold off of MRCP at this time.

## 2018-06-26 NOTE — ASSESSMENT & PLAN NOTE
- Admitted from [facility] on 5/25/18 early AM with WBC around 100s, for 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 Ordered routine labs in addition to FLT3, NGS, and AML FISH.  - lo res HLA typing on 5/26/18 and hi res on 5/27/18 done in anticipation of possible need for future transplant   - Pt with two daughters, two full sisters (in their mid 60s, one with brain aneurysm hx, other one without any medical issues), and one full brother (59 y/o healthy).  - ECHO ordered 5/25/18, EF 65%  - PICC line placed 5/25/18   - initially on hydrea, discontinued on 5/28.   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  - started on induction therapy on 5/29/18.  - Day 29 of 7+3.    - allopurinol stopped 6/11  - prophylactic antimicrobials: Acyclovir, Voriconazole  - will need CNS Prophylaxis given her elevated WBC (high risk) on admission after count recovery.  - NPM1 +, CEBPA (-), FLT3 (+).  On Midostaurin 50 mg PO BID until Day 14 (held starting 6/8 to 6/11). Stopped when FLAG JENNIFER re-induction started. Restarted Midostaurin at 25 mg bid on day 8 of FLAG JENNIFER induction (6/22), increase to full dose 50 mg bid today (6/26) as improvement in liver enzymes.  - day 14 bone marrow biopsy completed 6/11 showing persistent disease with 15% CD 34 positive blasts  - Day 12 of FLAG-JENNIFER, tolerating without difficulty except nausea   - repeat 2D echo on 6/15 with preserved EF of 60%  - plan for day 14 restaging bone marrow biopsy on 6/29

## 2018-06-26 NOTE — PROGRESS NOTES
[facility]  Hematology  Bone Marrow Transplant  Progress Note    Patient Name: Noreen Mac  Admission Date: 5/24/2018  Hospital Length of Stay: 33 days  Code Status: Full Code    Subjective:     Interval History:   Day 29 of 7+3 and day 12 of FLAG JENNIFER. Liver enzymes improved and Midostaurin increased to full dose 50 mg bid. Nausea controlled, afebrile, VSS, NEON.    Objective:     Vital Signs (Most Recent):  Temp: 98.7 °F (37.1 °C) (06/26/18 1240)  Pulse: 73 (06/26/18 1240)  Resp: 18 (06/26/18 1240)  BP: (!) 141/68 (06/26/18 1240)  SpO2: 97 % (06/26/18 1240) Vital Signs (24h Range):  Temp:  [97.6 °F (36.4 °C)-98.8 °F (37.1 °C)] 98.7 °F (37.1 °C)  Pulse:  [69-83] 73  Resp:  [16-20] 18  SpO2:  [94 %-97 %] 97 %  BP: (123-144)/(60-70) 141/68     Weight: 71 kg (156 lb 8.4 oz)  Body mass index is 27.73 kg/m².  Body surface area is 1.78 meters squared.    Intake/Output - Last 3 Shifts       06/24 0700 - 06/25 0659 06/25 0700 - 06/26 0659 06/26 0700 - 06/27 0659    P.O. 1480 660 405    I.V. (mL/kg) 2416.7 (34.4) 1616.7 (22.8) 1902.7 (26.8)    Blood   217    IV Piggyback 1700 1250 850    Total Intake(mL/kg) 5596.7 (79.7) 3526.7 (49.7) 3374.7 (47.5)    Urine (mL/kg/hr) 5950 (3.5) 4825 (2.8) 450 (0.9)    Emesis/NG output  200 (0.1) 250 (0.5)    Total Output 5950 5025 700    Net -353.3 -1498.3 +2674.7           Stool Occurrence 0 x 0 x     Emesis Occurrence  1 x           Physical Exam   Constitutional: She is oriented to person, place, and time. She appears well-developed and well-nourished. No distress.   HENT:   Mouth/Throat: Oropharynx is clear and moist. No oropharyngeal exudate or posterior oropharyngeal erythema.   Eyes: Conjunctivae are normal. Pupils are equal, round, and reactive to light. No scleral icterus.   Neck: Normal range of motion. Neck supple. No thyromegaly present.   Cardiovascular: Normal rate, regular rhythm, normal heart sounds and intact distal pulses.    Pulmonary/Chest:  Effort normal and breath sounds normal. No respiratory distress.   Abdominal: Soft. Bowel sounds are normal. She exhibits no distension. There is no splenomegaly or hepatomegaly. There is no tenderness.   Musculoskeletal: Normal range of motion. She exhibits no edema or tenderness.   Neurological: She is alert and oriented to person, place, and time. No cranial nerve deficit. Coordination normal.   Skin: No rash noted. No cyanosis. No pallor. Nails show no clubbing.   PICC intact R arm no redness or drainage   Psychiatric: She has a normal mood and affect. Thought content normal.   Vitals reviewed.      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 Ordered routine labs in addition to FLT3, NGS, and AML FISH.  - lo res HLA typing on 5/26/18 and hi res on 5/27/18 done in anticipation of possible need for future transplant   - Pt with two daughters, two full sisters (in their mid 60s, one with brain aneurysm hx, other one without any medical issues), and one full brother (59 y/o healthy).  - ECHO ordered 5/25/18, EF 65%  - PICC line placed 5/25/18   - initially on hydrea, discontinued on 5/28.   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  - started on induction therapy on 5/29/18.  - Day 29 of  7+3.    - allopurinol stopped 6/11  - prophylactic antimicrobials: Acyclovir, Voriconazole  - will need CNS Prophylaxis given her elevated WBC (high risk) on admission after count recovery.  - NPM1 +, CEBPA (-), FLT3 (+).  On Midostaurin 50 mg PO BID until Day 14 (held starting 6/8 to 6/11). Stopped when FLAG JENNIFER re-induction started. Restarted Midostaurin at 25 mg bid on day 8 of FLAG JENNIFER induction (6/22), increase to full dose 50 mg bid today (6/26) as improvement in liver enzymes.  - day 14 bone marrow biopsy completed 6/11 showing persistent disease with 15% CD 34 positive blasts  - Day 12 of FLAG-JENNIFER, tolerating without difficulty except nausea   - repeat 2D echo on 6/15 with preserved EF of 60%  - plan for day 14 restaging bone marrow biopsy on 6/29          Staphylococcus epidermidis bacteremia    - blood cultures from 6/11 at 0430 with gram + cocci, staph epidermis sensitive for Vancomycin, CXR and UA unremarkable.   - Continue vancomycin with projected end date of 6/27  - Vanc at 1250 mg BID, last vanc trough 19.9, decreased to 1000 mg bid (6/25)  - surveillance blood cultures repeated 6/13 NGTD  - de-escalated aztreonam back to prophylactic cipro 6/16  - ID consulted on 6/19, rec continue Vanc for 2 weeks post last negative culture (EOT 6/27)  - BC from 6/11 with gram negative rods, leptotrichia goodfellowii  - completed Flagyl po x 5 days (EOT 6/24)  - ID has signed off        Pancytopenia    - secondary to chemotherapy.   - WBC 0.2 and ANC 0, on Neupogen per FLAG JENNIFER protocol  - hgb 7.6 and platelet count 7,000 today  - tranfuse if Hb < 7 g/dL and Plt < 10,000/mm3 or active bleed.  - will transfuse 1 unit of platelets today             Abnormal liver enzymes    AST and ALT have normalized. Alk phos stable at 1031 today.   -   - midostaurin started day 8 at 25 mg bid, monitoring liver enzymes closely and improved. Increasing Midostaurin to 50 mg bid today  - 6/22/18 liver US showing mild  intrahepatic dilation, otherwise impression of hepatic steatosis.  Will hold off of MRCP at this time.          Hypophosphatemia    -phos 1.9 today  -replace phos per protocol        Hypokalemia    - wnl today at 3.7          CINV (chemotherapy-induced nausea and vomiting)    Improved with Olanzapine.  - complete olanzapine daily x 6 days  - prn Zofran and Ativan        Hypocalcemia    - Corrected calcium 8.7 today.  --patient refusing scheduled calcium replacement, will D/C calcium today          Hypomagnesemia    Mg 1.6 today.  - Mag oxide replacement per protocol         MCTD (mixed connective tissue disease)    - MCTD-NOS  - previously on MTX and Prednisone.   - off therapy now and otherwise doing well.   - can follow up outpatient with her Rheumatologist upon discharge.         Pulmonary nodule    - CXR suggestive of 7 mm slightly irregular shaped radiodensity projected over the left upper lung zone which could represent a calcified granuloma but remains indeterminate.  - CT Chest on 5/28: Multiple scattered noncalcified pulmonary granulomas, the largest measuring 8 mm in the lateral segment of the right middle lobe.   - repeat non-contrast chest CT at 6-12 months         Insomnia    - PRN nightly Xanax.         HTN (hypertension)    - history of SVT and reports intermittent palpitations at times.  - EKG on 5/28 - NSR.  - increased metoprolol XL to 25 mg QD,she feels better with this dose.  - metoprolol tartrate changed on 6/13 to 25 mg bid (hold for SBP <100) due to hypotension  - BP improved        Osteoporosis    - takes prolia once every 6 months, last given mid April 2018   - no acute issue         Depression    - no acute issue   - continue home Escitalopram             VTE Risk Mitigation         Ordered     heparin, porcine (PF) 100 unit/mL injection flush 300 Units  As needed (PRN)      06/15/18 1657     Place MAYLIN hose  Until discontinued      05/25/18 0032     IP VTE HIGH RISK PATIENT  Once       05/25/18 0032          Disposition: pending day 14 marrow, treatment plan and count recovery    Sweetie Lepe NP  Bone Marrow Transplant  [facility]

## 2018-06-27 ENCOUNTER — TELEPHONE (OUTPATIENT)
Dept: PHARMACY | Facility: CLINIC | Age: 68
End: 2018-06-27

## 2018-06-27 LAB
ABO + RH BLD: NORMAL
ALBUMIN SERPL BCP-MCNC: 2.2 G/DL
ALBUMIN SERPL BCP-MCNC: 2.6 G/DL
ALBUMIN SERPL BCP-MCNC: 2.6 G/DL
ALP SERPL-CCNC: 640 U/L
ALP SERPL-CCNC: 725 U/L
ALP SERPL-CCNC: 725 U/L
ALT SERPL W/O P-5'-P-CCNC: 10 U/L
ALT SERPL W/O P-5'-P-CCNC: 12 U/L
ALT SERPL W/O P-5'-P-CCNC: 12 U/L
ANION GAP SERPL CALC-SCNC: 6 MMOL/L
ANION GAP SERPL CALC-SCNC: 7 MMOL/L
ANION GAP SERPL CALC-SCNC: 7 MMOL/L
ANISOCYTOSIS BLD QL SMEAR: SLIGHT
AST SERPL-CCNC: 12 U/L
BASOPHILS # BLD AUTO: 0 K/UL
BASOPHILS NFR BLD: 0 %
BILIRUB SERPL-MCNC: 0.5 MG/DL
BILIRUB SERPL-MCNC: 0.8 MG/DL
BILIRUB SERPL-MCNC: 0.8 MG/DL
BLD GP AB SCN CELLS X3 SERPL QL: NORMAL
BLD PROD TYP BPU: NORMAL
BLOOD UNIT EXPIRATION DATE: NORMAL
BLOOD UNIT TYPE CODE: 6200
BLOOD UNIT TYPE: NORMAL
BUN SERPL-MCNC: 2 MG/DL
BUN SERPL-MCNC: 3 MG/DL
BUN SERPL-MCNC: 3 MG/DL
CA-I BLDV-SCNC: 0.91 MMOL/L
CALCIUM SERPL-MCNC: 6.4 MG/DL
CALCIUM SERPL-MCNC: 7.9 MG/DL
CALCIUM SERPL-MCNC: 7.9 MG/DL
CHLORIDE SERPL-SCNC: 112 MMOL/L
CHLORIDE SERPL-SCNC: 112 MMOL/L
CHLORIDE SERPL-SCNC: 113 MMOL/L
CO2 SERPL-SCNC: 23 MMOL/L
CODING SYSTEM: NORMAL
CREAT SERPL-MCNC: 0.5 MG/DL
CREAT SERPL-MCNC: 0.6 MG/DL
CREAT SERPL-MCNC: 0.6 MG/DL
DIFFERENTIAL METHOD: ABNORMAL
DISPENSE STATUS: NORMAL
EOSINOPHIL # BLD AUTO: 0 K/UL
EOSINOPHIL NFR BLD: 0 %
ERYTHROCYTE [DISTWIDTH] IN BLOOD BY AUTOMATED COUNT: 15.5 %
EST. GFR  (AFRICAN AMERICAN): >60 ML/MIN/1.73 M^2
EST. GFR  (NON AFRICAN AMERICAN): >60 ML/MIN/1.73 M^2
GLUCOSE SERPL-MCNC: 102 MG/DL
GLUCOSE SERPL-MCNC: 102 MG/DL
GLUCOSE SERPL-MCNC: 108 MG/DL
HCT VFR BLD AUTO: 19.6 %
HGB BLD-MCNC: 6.5 G/DL
HYPOCHROMIA BLD QL SMEAR: ABNORMAL
IMM GRANULOCYTES # BLD AUTO: 0 K/UL
IMM GRANULOCYTES NFR BLD AUTO: 0 %
LYMPHOCYTES # BLD AUTO: 0 K/UL
LYMPHOCYTES NFR BLD: 100 %
MAGNESIUM SERPL-MCNC: 1.1 MG/DL
MAGNESIUM SERPL-MCNC: 1.7 MG/DL
MCH RBC QN AUTO: 30.7 PG
MCHC RBC AUTO-ENTMCNC: 33.2 G/DL
MCV RBC AUTO: 93 FL
MONOCYTES # BLD AUTO: 0 K/UL
MONOCYTES NFR BLD: 0 %
NEUTROPHILS # BLD AUTO: 0 K/UL
NEUTROPHILS NFR BLD: 0 %
NRBC BLD-RTO: 0 /100 WBC
NUM UNITS TRANS PACKED RBC: NORMAL
OVALOCYTES BLD QL SMEAR: ABNORMAL
PHOSPHATE SERPL-MCNC: 2 MG/DL
PHOSPHATE SERPL-MCNC: 2.1 MG/DL
PLATELET # BLD AUTO: 21 K/UL
PMV BLD AUTO: 11.9 FL
POIKILOCYTOSIS BLD QL SMEAR: SLIGHT
POLYCHROMASIA BLD QL SMEAR: ABNORMAL
POTASSIUM SERPL-SCNC: 2.7 MMOL/L
POTASSIUM SERPL-SCNC: 3.9 MMOL/L
POTASSIUM SERPL-SCNC: 3.9 MMOL/L
PROT SERPL-MCNC: 4.5 G/DL
PROT SERPL-MCNC: 5.6 G/DL
PROT SERPL-MCNC: 5.6 G/DL
RBC # BLD AUTO: 2.12 M/UL
SODIUM SERPL-SCNC: 142 MMOL/L
WBC # BLD AUTO: 0.02 K/UL

## 2018-06-27 PROCEDURE — 25000003 PHARM REV CODE 250: Performed by: INTERNAL MEDICINE

## 2018-06-27 PROCEDURE — 25000003 PHARM REV CODE 250: Performed by: STUDENT IN AN ORGANIZED HEALTH CARE EDUCATION/TRAINING PROGRAM

## 2018-06-27 PROCEDURE — 84100 ASSAY OF PHOSPHORUS: CPT

## 2018-06-27 PROCEDURE — C9113 INJ PANTOPRAZOLE SODIUM, VIA: HCPCS | Performed by: NURSE PRACTITIONER

## 2018-06-27 PROCEDURE — 63600175 PHARM REV CODE 636 W HCPCS: Performed by: NURSE PRACTITIONER

## 2018-06-27 PROCEDURE — 25000003 PHARM REV CODE 250: Performed by: NURSE PRACTITIONER

## 2018-06-27 PROCEDURE — 83735 ASSAY OF MAGNESIUM: CPT

## 2018-06-27 PROCEDURE — 99233 SBSQ HOSP IP/OBS HIGH 50: CPT | Mod: ,,, | Performed by: INTERNAL MEDICINE

## 2018-06-27 PROCEDURE — 85025 COMPLETE CBC W/AUTO DIFF WBC: CPT

## 2018-06-27 PROCEDURE — 20600001 HC STEP DOWN PRIVATE ROOM

## 2018-06-27 PROCEDURE — 36430 TRANSFUSION BLD/BLD COMPNT: CPT

## 2018-06-27 PROCEDURE — P9040 RBC LEUKOREDUCED IRRADIATED: HCPCS

## 2018-06-27 PROCEDURE — 82330 ASSAY OF CALCIUM: CPT

## 2018-06-27 PROCEDURE — 86850 RBC ANTIBODY SCREEN: CPT

## 2018-06-27 PROCEDURE — 63600175 PHARM REV CODE 636 W HCPCS: Performed by: INTERNAL MEDICINE

## 2018-06-27 PROCEDURE — 63600175 PHARM REV CODE 636 W HCPCS: Mod: JG | Performed by: INTERNAL MEDICINE

## 2018-06-27 PROCEDURE — A4216 STERILE WATER/SALINE, 10 ML: HCPCS | Performed by: INTERNAL MEDICINE

## 2018-06-27 PROCEDURE — 80053 COMPREHEN METABOLIC PANEL: CPT | Mod: 91

## 2018-06-27 RX ORDER — MAGNESIUM SULFATE HEPTAHYDRATE 40 MG/ML
2 INJECTION, SOLUTION INTRAVENOUS
Status: COMPLETED | OUTPATIENT
Start: 2018-06-27 | End: 2018-06-27

## 2018-06-27 RX ORDER — DIPHENHYDRAMINE HYDROCHLORIDE 50 MG/ML
12.5 INJECTION INTRAMUSCULAR; INTRAVENOUS EVERY 6 HOURS PRN
Status: DISCONTINUED | OUTPATIENT
Start: 2018-06-27 | End: 2018-07-13 | Stop reason: HOSPADM

## 2018-06-27 RX ORDER — PROCHLORPERAZINE EDISYLATE 5 MG/ML
10 INJECTION INTRAMUSCULAR; INTRAVENOUS EVERY 6 HOURS
Status: DISCONTINUED | OUTPATIENT
Start: 2018-06-27 | End: 2018-07-06

## 2018-06-27 RX ORDER — POTASSIUM CHLORIDE 14.9 MG/ML
20 INJECTION INTRAVENOUS
Status: COMPLETED | OUTPATIENT
Start: 2018-06-27 | End: 2018-06-27

## 2018-06-27 RX ADMIN — ACYCLOVIR SODIUM 200 MG: 50 INJECTION, SOLUTION INTRAVENOUS at 09:06

## 2018-06-27 RX ADMIN — MAGNESIUM SULFATE IN WATER 2 G: 40 INJECTION, SOLUTION INTRAVENOUS at 07:06

## 2018-06-27 RX ADMIN — POTASSIUM CHLORIDE 20 MEQ: 200 INJECTION, SOLUTION INTRAVENOUS at 12:06

## 2018-06-27 RX ADMIN — VANCOMYCIN HYDROCHLORIDE 1000 MG: 1 INJECTION, POWDER, LYOPHILIZED, FOR SOLUTION INTRAVENOUS at 10:06

## 2018-06-27 RX ADMIN — ONDANSETRON 8 MG: 2 INJECTION INTRAMUSCULAR; INTRAVENOUS at 08:06

## 2018-06-27 RX ADMIN — VITAMIN D, TAB 1000IU (100/BT) 5000 UNITS: 25 TAB at 08:06

## 2018-06-27 RX ADMIN — PROCHLORPERAZINE EDISYLATE 10 MG: 5 INJECTION INTRAMUSCULAR; INTRAVENOUS at 11:06

## 2018-06-27 RX ADMIN — POTASSIUM BICARBONATE 25 MEQ: 25 TABLET, EFFERVESCENT ORAL at 05:06

## 2018-06-27 RX ADMIN — PROCHLORPERAZINE EDISYLATE 10 MG: 5 INJECTION INTRAMUSCULAR; INTRAVENOUS at 05:06

## 2018-06-27 RX ADMIN — DIPHENHYDRAMINE HYDROCHLORIDE 12.5 MG: 50 INJECTION, SOLUTION INTRAMUSCULAR; INTRAVENOUS at 05:06

## 2018-06-27 RX ADMIN — METOPROLOL TARTRATE 25 MG: 25 TABLET, FILM COATED ORAL at 08:06

## 2018-06-27 RX ADMIN — PROCHLORPERAZINE EDISYLATE 10 MG: 5 INJECTION INTRAMUSCULAR; INTRAVENOUS at 12:06

## 2018-06-27 RX ADMIN — Medication 10 ML: at 11:06

## 2018-06-27 RX ADMIN — Medication 800 MG: at 09:06

## 2018-06-27 RX ADMIN — DEXTROSE 310 MG: 5 SOLUTION INTRAVENOUS at 09:06

## 2018-06-27 RX ADMIN — Medication 10 ML: at 12:06

## 2018-06-27 RX ADMIN — POTASSIUM CHLORIDE 20 MEQ: 200 INJECTION, SOLUTION INTRAVENOUS at 10:06

## 2018-06-27 RX ADMIN — CIPROFLOXACIN 400 MG: 2 INJECTION, SOLUTION INTRAVENOUS at 09:06

## 2018-06-27 RX ADMIN — Medication 10 ML: at 05:06

## 2018-06-27 RX ADMIN — ALPRAZOLAM 0.25 MG: 0.25 TABLET ORAL at 08:06

## 2018-06-27 RX ADMIN — DEXTROSE 310 MG: 5 SOLUTION INTRAVENOUS at 08:06

## 2018-06-27 RX ADMIN — POTASSIUM BICARBONATE 25 MEQ: 25 TABLET, EFFERVESCENT ORAL at 09:06

## 2018-06-27 RX ADMIN — VANCOMYCIN HYDROCHLORIDE 1000 MG: 1 INJECTION, POWDER, LYOPHILIZED, FOR SOLUTION INTRAVENOUS at 09:06

## 2018-06-27 RX ADMIN — DEXTROSE 40 MG: 50 INJECTION, SOLUTION INTRAVENOUS at 10:06

## 2018-06-27 RX ADMIN — ACETAMINOPHEN 650 MG: 325 TABLET, FILM COATED ORAL at 05:06

## 2018-06-27 RX ADMIN — METOPROLOL TARTRATE 25 MG: 25 TABLET, FILM COATED ORAL at 09:06

## 2018-06-27 RX ADMIN — MAGNESIUM SULFATE IN WATER 2 G: 40 INJECTION, SOLUTION INTRAVENOUS at 05:06

## 2018-06-27 RX ADMIN — Medication 800 MG: at 08:06

## 2018-06-27 RX ADMIN — POTASSIUM CHLORIDE 20 MEQ: 200 INJECTION, SOLUTION INTRAVENOUS at 03:06

## 2018-06-27 RX ADMIN — CALCIUM CHLORIDE 1 G: 100 INJECTION INTRAVENOUS; INTRAVENTRICULAR at 01:06

## 2018-06-27 RX ADMIN — FILGRASTIM 480 MCG: 480 INJECTION, SOLUTION INTRAVENOUS; SUBCUTANEOUS at 07:06

## 2018-06-27 RX ADMIN — POTASSIUM PHOSPHATE, MONOBASIC AND POTASSIUM PHOSPHATE, DIBASIC 20 MMOL: 224; 236 INJECTION, SOLUTION, CONCENTRATE INTRAVENOUS at 05:06

## 2018-06-27 RX ADMIN — ACYCLOVIR SODIUM 200 MG: 50 INJECTION, SOLUTION INTRAVENOUS at 10:06

## 2018-06-27 RX ADMIN — ALPRAZOLAM 0.25 MG: 0.25 TABLET ORAL at 07:06

## 2018-06-27 RX ADMIN — MAGNESIUM SULFATE IN WATER 2 G: 40 INJECTION, SOLUTION INTRAVENOUS at 11:06

## 2018-06-27 RX ADMIN — LORAZEPAM 0.5 MG: 2 INJECTION INTRAMUSCULAR; INTRAVENOUS at 10:06

## 2018-06-27 RX ADMIN — ESCITALOPRAM 10 MG: 5 TABLET, FILM COATED ORAL at 08:06

## 2018-06-27 NOTE — SUBJECTIVE & OBJECTIVE
Subjective:     Interval History:   Day 30 of 7+3 and Day 13 of FLAG JENNIFER. Persistent nausea and emesis x 1 yesterday. Compazine changed to scheduled. Vanc ends today. Afebrile, VSS. Aggressive electrolyte replacement, low electrolytes possibly due to Midostaurin.     Objective:     Vital Signs (Most Recent):  Temp: 98.2 °F (36.8 °C) (06/27/18 0918)  Pulse: 70 (06/27/18 0918)  Resp: 18 (06/27/18 0918)  BP: 136/74 (06/27/18 0918)  SpO2: 95 % (06/27/18 0918) Vital Signs (24h Range):  Temp:  [97.3 °F (36.3 °C)-98.8 °F (37.1 °C)] 98.2 °F (36.8 °C)  Pulse:  [70-88] 70  Resp:  [17-20] 18  SpO2:  [92 %-98 %] 95 %  BP: (105-144)/(51-74) 136/74     Weight: 70.1 kg (154 lb 10.4 oz)  Body mass index is 27.4 kg/m².  Body surface area is 1.77 meters squared.      Intake/Output - Last 3 Shifts       06/25 0700 - 06/26 0659 06/26 0700 - 06/27 0659 06/27 0700 - 06/28 0659    P.O. 660 405 720    I.V. (mL/kg) 1616.7 (22.8) 3260 (46.5) 583.3 (8.3)    Blood  567     IV Piggyback 1250 1950 300    Total Intake(mL/kg) 3526.7 (49.7) 6182 (88.2) 1603.3 (22.9)    Urine (mL/kg/hr) 4825 (2.8) 5000 (3) 1600 (5.2)    Emesis/NG output 200 (0.1) 250 (0.1)     Total Output 5025 5250 1600    Net -1498.3 +932 +3.3           Stool Occurrence 0 x      Emesis Occurrence 1 x            Physical Exam   Constitutional: She is oriented to person, place, and time. She appears well-developed and well-nourished. No distress.   HENT:   Mouth/Throat: Oropharynx is clear and moist. No oropharyngeal exudate or posterior oropharyngeal erythema.   Eyes: Conjunctivae are normal. Pupils are equal, round, and reactive to light. No scleral icterus.   Neck: Normal range of motion. Neck supple. No thyromegaly present.   Cardiovascular: Normal rate, regular rhythm, normal heart sounds and intact distal pulses.    Pulmonary/Chest: Effort normal and breath sounds normal. No respiratory distress.   Abdominal: Soft. Bowel sounds are normal. She exhibits no distension. There  is no splenomegaly or hepatomegaly. There is no tenderness.   Musculoskeletal: Normal range of motion. She exhibits no edema or tenderness.   Neurological: She is alert and oriented to person, place, and time. No cranial nerve deficit. Coordination normal.   Skin: No rash noted. No cyanosis. No pallor. Nails show no clubbing.   PICC intact R arm no redness or drainage   Psychiatric: She has a normal mood and affect. Thought content normal.   Vitals reviewed.      Significant Labs:   CBC:   Recent Labs  Lab 06/26/18 0317 06/27/18  0345   WBC 0.02* 0.02*   HGB 7.6* 6.5*   HCT 23.0* 19.6*   PLT 7* 21*    and CMP:   Recent Labs  Lab 06/26/18 0317 06/27/18  0345    142   K 3.7 2.7*   * 113*   CO2 24 23   GLU 93 108   BUN 3* 2*   CREATININE 0.6 0.5   CALCIUM 7.4* 6.4*   PROT 5.1* 4.5*   ALBUMIN 2.4* 2.2*   BILITOT 0.6 0.5   ALKPHOS 893* 640*   AST 19 12   ALT 15 10   ANIONGAP 5* 6*   EGFRNONAA >60.0 >60.0       Diagnostic Results:  None

## 2018-06-27 NOTE — ASSESSMENT & PLAN NOTE
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 Ordered routine labs in addition to FLT3, NGS, and AML FISH.  - lo res HLA typing on 5/26/18 and hi res on 5/27/18 done in anticipation of possible need for future transplant   - Pt with two daughters, two full sisters (in their mid 60s, one with brain aneurysm hx, other one without any medical issues), and one full brother (59 y/o healthy).  - ECHO ordered 5/25/18, EF 65%  - PICC line placed 5/25/18   - initially on hydrea, discontinued on 5/28.   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  - started on induction therapy on 5/29/18.  - Day 30 of 7+3.    - allopurinol stopped 6/11  - prophylactic antimicrobials: Acyclovir, Voriconazole  - will need CNS Prophylaxis given her elevated WBC (high risk) on admission after count recovery.  - NPM1 +, CEBPA (-), FLT3 (+).  On Midostaurin 50 mg PO BID until Day 14 (held starting 6/8 to 6/11). Stopped when FLAG JENNIFER re-induction started. Restarted Midostaurin at 25 mg bid on day 8 of FLAG JENNIFER induction (6/22), increase to full dose 50 mg bid today (6/26) as improvement in liver enzymes.  - day 14 bone marrow biopsy completed 6/11 showing persistent disease with 15% CD 34 positive blasts  - Day 13 of FLAG-JENNIFER, tolerating without difficulty except nausea/vomiting  - repeat 2D echo on 6/15 with preserved EF of 60%  - plan for day 14 restaging bone marrow biopsy on 6/29

## 2018-06-27 NOTE — ASSESSMENT & PLAN NOTE
- blood cultures from 6/11 at 0430 with gram + cocci, staph epidermis sensitive for Vancomycin, CXR and UA unremarkable.   - Vancomycin with projected end date of today (6/27)  - Vanc at 1250 mg BID, last vanc trough 19.9, decreased to 1000 mg bid (6/25)  - surveillance blood cultures repeated 6/13 NGTD  - de-escalated aztreonam back to prophylactic cipro 6/16  - ID consulted on 6/19, rec continue Vanc for 2 weeks post last negative culture (EOT 6/27)  - BC from 6/11 with gram negative rods, leptotrichia goodfellowii  - completed Flagyl po x 5 days (EOT 6/24)  - ID has signed off

## 2018-06-27 NOTE — ASSESSMENT & PLAN NOTE
AST and ALT have normalized. Alk phos improved to 640 today.   -   - midostaurin started day 8 at 25 mg bid, monitoring liver enzymes closely and improved. Increasing Midostaurin to 50 mg bid 6/26  - 6/22/18 liver US showing mild intrahepatic dilation, otherwise impression of hepatic steatosis.  Will hold off of MRCP at this time.

## 2018-06-27 NOTE — ASSESSMENT & PLAN NOTE
- Corrected calcium 7.8 today.  --patient refusing scheduled po calcium replacement duet nausea  - will give 1 gram of calcium chloride IV today

## 2018-06-27 NOTE — PLAN OF CARE
Problem: Patient Care Overview  Goal: Plan of Care Review  Outcome: Ongoing (interventions implemented as appropriate)  Patient remains free from falls and injury this shift. Bed in low, locked position with call bell in reach. Patient encouraged to call for assistance when getting out of bed. Patient verbalized understanding. All belongings within reach. Afebrile - maintained on IV Vanc, voriconazole, cipro, & acyclovir. Nausea reported after breakfast (no emesis reported) - compazine scheduled; zofran and ativan both used PRN. Neutropenic and thrombocytopenic precautions maintained. IVFs maintained. Mg, Calcium, K, and Phos replacements given as ordered. Transfused one unit of PRBCs with no complications. Xanax given for reports of anxiety. will continue to monitor.

## 2018-06-27 NOTE — PROGRESS NOTES
[facility]  Hematology  Bone Marrow Transplant  Progress Note    Patient Name: Noreen Mac  Admission Date: 5/24/2018  Hospital Length of Stay: 34 days  Code Status: Full Code    Subjective:     Interval History:   Day 30 of 7+3 and Day 13 of FLAG JENNIFER. Persistent nausea and emesis x 1 yesterday. Compazine changed to scheduled. Vanc ends today. Afebrile, VSS. Aggressive electrolyte replacement, low electrolytes possibly due to Midostaurin.     Objective:     Vital Signs (Most Recent):  Temp: 98.2 °F (36.8 °C) (06/27/18 0918)  Pulse: 70 (06/27/18 0918)  Resp: 18 (06/27/18 0918)  BP: 136/74 (06/27/18 0918)  SpO2: 95 % (06/27/18 0918) Vital Signs (24h Range):  Temp:  [97.3 °F (36.3 °C)-98.8 °F (37.1 °C)] 98.2 °F (36.8 °C)  Pulse:  [70-88] 70  Resp:  [17-20] 18  SpO2:  [92 %-98 %] 95 %  BP: (105-144)/(51-74) 136/74     Weight: 70.1 kg (154 lb 10.4 oz)  Body mass index is 27.4 kg/m².  Body surface area is 1.77 meters squared.      Intake/Output - Last 3 Shifts       06/25 0700 - 06/26 0659 06/26 0700 - 06/27 0659 06/27 0700 - 06/28 0659    P.O. 660 405 720    I.V. (mL/kg) 1616.7 (22.8) 3260 (46.5) 583.3 (8.3)    Blood  567     IV Piggyback 1250 1950 300    Total Intake(mL/kg) 3526.7 (49.7) 6182 (88.2) 1603.3 (22.9)    Urine (mL/kg/hr) 4825 (2.8) 5000 (3) 1600 (5.2)    Emesis/NG output 200 (0.1) 250 (0.1)     Total Output 5025 5250 1600    Net -1498.3 +932 +3.3           Stool Occurrence 0 x      Emesis Occurrence 1 x            Physical Exam   Constitutional: She is oriented to person, place, and time. She appears well-developed and well-nourished. No distress.   HENT:   Mouth/Throat: Oropharynx is clear and moist. No oropharyngeal exudate or posterior oropharyngeal erythema.   Eyes: Conjunctivae are normal. Pupils are equal, round, and reactive to light. No scleral icterus.   Neck: Normal range of motion. Neck supple. No thyromegaly present.   Cardiovascular: Normal rate, regular rhythm, normal  heart sounds and intact distal pulses.    Pulmonary/Chest: Effort normal and breath sounds normal. No respiratory distress.   Abdominal: Soft. Bowel sounds are normal. She exhibits no distension. There is no splenomegaly or hepatomegaly. There is no tenderness.   Musculoskeletal: Normal range of motion. She exhibits no edema or tenderness.   Neurological: She is alert and oriented to person, place, and time. No cranial nerve deficit. Coordination normal.   Skin: No rash noted. No cyanosis. No pallor. Nails show no clubbing.   PICC intact R arm no redness or drainage   Psychiatric: She has a normal mood and affect. Thought content normal.   Vitals reviewed.      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 Ordered routine labs in addition to FLT3, NGS, and AML FISH.  - lo res HLA typing on 5/26/18 and hi res on 5/27/18 done in anticipation of possible need for future transplant   - Pt with two daughters, two full sisters (in their mid 60s, one with brain aneurysm hx, other one without any medical issues), and one full brother (61 y/o healthy).  - ECHO ordered 5/25/18, EF 65%  - PICC line placed 5/25/18   - initially on hydrea, discontinued on 5/28.   - path MOST COMPATIBLE WITH NON-M3 ACUTE MYELOID  LEUKEMIA.  - started on induction therapy on 5/29/18.  - Day 30 of 7+3.    - allopurinol stopped 6/11  - prophylactic antimicrobials: Acyclovir, Voriconazole  - will need CNS Prophylaxis given her elevated WBC (high risk) on admission after count recovery.  - NPM1 +, CEBPA (-), FLT3 (+).  On Midostaurin 50 mg PO BID until Day 14 (held starting 6/8 to 6/11). Stopped when FLAG JENNIFER re-induction started. Restarted Midostaurin at 25 mg bid on day 8 of FLAG JENNIFER induction (6/22), increase to full dose 50 mg bid today (6/26) as improvement in liver enzymes.  - day 14 bone marrow biopsy completed 6/11 showing persistent disease with 15% CD 34 positive blasts  - Day 13 of FLAG-JENNIFER, tolerating without difficulty except nausea/vomiting  - repeat 2D echo on 6/15 with preserved EF of 60%  - plan for day 14 restaging bone marrow biopsy on 6/29          Staphylococcus epidermidis bacteremia    - blood cultures from 6/11 at 0430 with gram + cocci, staph epidermis sensitive for Vancomycin, CXR and UA unremarkable.   - Vancomycin with projected end date of today (6/27)  - Vanc at 1250 mg BID, last vanc trough 19.9, decreased to 1000 mg bid (6/25)  - surveillance blood cultures repeated 6/13 NGTD  - de-escalated aztreonam back to prophylactic cipro 6/16  - ID consulted on 6/19, rec continue Vanc for 2 weeks post last negative culture (EOT 6/27)  - BC from 6/11 with gram negative rods, leptotrichia goodfellowii  - completed Flagyl po x 5 days (EOT 6/24)  - ID has signed off        Pancytopenia    - secondary to chemotherapy.   - WBC 0.02 and ANC 0, on Neupogen per FLAG JENNIFER protocol  - hgb 6.5 and platelet count 21,000 today  - tranfuse if Hb < 7 g/dL and Plt < 10,000/mm3 or active bleed.  - will transfuse 1 unit of PRBC today             Abnormal liver enzymes    AST and ALT have normalized. Alk phos improved to 640 today.   -   - midostaurin started day 8 at 25 mg bid, monitoring liver enzymes closely and improved. Increasing  Midostaurin to 50 mg bid 6/26  - 6/22/18 liver US showing mild intrahepatic dilation, otherwise impression of hepatic steatosis.  Will hold off of MRCP at this time.          Hypophosphatemia    -phos 2.1 today  -replace phos IV per protocol        Hypokalemia    - K 2.9 today  - will give 80 meq of IV potassium chloride today          CINV (chemotherapy-induced nausea and vomiting)    - complete olanzapine daily x 6 days  - prn Zofran and Ativan  - will schedule Compazine IV q6hr        Hypocalcemia    - Corrected calcium 7.8 today.  --patient refusing scheduled po calcium replacement duet nausea  - will give 1 gram of calcium chloride IV today          Hypomagnesemia    Mg 1.1 today.  - Mag IV replacement per protocol         MCTD (mixed connective tissue disease)    - MCTD-NOS  - previously on MTX and Prednisone.   - off therapy now and otherwise doing well.   - can follow up outpatient with her Rheumatologist upon discharge.         Pulmonary nodule    - CXR suggestive of 7 mm slightly irregular shaped radiodensity projected over the left upper lung zone which could represent a calcified granuloma but remains indeterminate.  - CT Chest on 5/28: Multiple scattered noncalcified pulmonary granulomas, the largest measuring 8 mm in the lateral segment of the right middle lobe.   - repeat non-contrast chest CT at 6-12 months         Insomnia    - PRN nightly Xanax.         HTN (hypertension)    - history of SVT and reports intermittent palpitations at times.  - EKG on 5/28 - NSR.  - increased metoprolol XL to 25 mg QD,she feels better with this dose.  - metoprolol tartrate changed on 6/13 to 25 mg bid (hold for SBP <100) due to hypotension  - BP improved        Osteoporosis    - takes prolia once every 6 months, last given mid April 2018   - no acute issue         Depression    - no acute issue   - continue home Escitalopram             VTE Risk Mitigation         Ordered     heparin, porcine (PF) 100 unit/mL  injection flush 300 Units  As needed (PRN)      06/15/18 1657     Place MAYLIN hose  Until discontinued      05/25/18 0032     IP VTE HIGH RISK PATIENT  Once      05/25/18 0032          Disposition: continue inpatient supportive care pending day 14 marrow results    Sweetie Lepe NP  Bone Marrow Transplant  [facility]

## 2018-06-27 NOTE — ASSESSMENT & PLAN NOTE
- secondary to chemotherapy.   - WBC 0.02 and ANC 0, on Neupogen per FLAG JENNIFER protocol  - hgb 6.5 and platelet count 21,000 today  - tranfuse if Hb < 7 g/dL and Plt < 10,000/mm3 or active bleed.  - will transfuse 1 unit of PRBC today

## 2018-06-27 NOTE — PLAN OF CARE
Problem: Patient Care Overview  Goal: Plan of Care Review  Outcome: Ongoing (interventions implemented as appropriate)  POC reviewed with patient; understanding verbalized.  Nausea refractory to Lorazepam resolved with IVP zofran x1 dose. Voids clear, yellow urine via the BSC; no noted BM this shift. NS at 100mL. IV Vanc, Cipro, VFEND and Acyclovir continued as ordered. Vanc trough 13.6  is therapeutic. Nonskid footwear in place, bed locked in lowest position, side rails up x2.  Call light and personal items within reach. Ambulates in room independently. VSS and afebrile this shift.Will continue to monitor.    Electroytes replaced , 1 unit PRBC's transfusing. Neutropenic precautions maintained.

## 2018-06-28 PROBLEM — R50.81 NEUTROPENIC FEVER: Status: RESOLVED | Noted: 2018-06-21 | Resolved: 2018-06-28

## 2018-06-28 PROBLEM — D70.9 NEUTROPENIC FEVER: Status: RESOLVED | Noted: 2018-06-21 | Resolved: 2018-06-28

## 2018-06-28 LAB
ALBUMIN SERPL BCP-MCNC: 2.3 G/DL
ALBUMIN SERPL BCP-MCNC: 2.5 G/DL
ALP SERPL-CCNC: 607 U/L
ALP SERPL-CCNC: 619 U/L
ALT SERPL W/O P-5'-P-CCNC: 8 U/L
ALT SERPL W/O P-5'-P-CCNC: 9 U/L
ANION GAP SERPL CALC-SCNC: 5 MMOL/L
ANION GAP SERPL CALC-SCNC: 8 MMOL/L
ANISOCYTOSIS BLD QL SMEAR: SLIGHT
AST SERPL-CCNC: 10 U/L
AST SERPL-CCNC: 12 U/L
BASOPHILS # BLD AUTO: 0 K/UL
BASOPHILS NFR BLD: 0 %
BILIRUB SERPL-MCNC: 0.6 MG/DL
BILIRUB SERPL-MCNC: 0.7 MG/DL
BUN SERPL-MCNC: 3 MG/DL
BUN SERPL-MCNC: 3 MG/DL
CA-I BLDV-SCNC: 1.05 MMOL/L
CALCIUM SERPL-MCNC: 7.8 MG/DL
CALCIUM SERPL-MCNC: 7.9 MG/DL
CHLORIDE SERPL-SCNC: 109 MMOL/L
CHLORIDE SERPL-SCNC: 111 MMOL/L
CO2 SERPL-SCNC: 21 MMOL/L
CO2 SERPL-SCNC: 26 MMOL/L
CREAT SERPL-MCNC: 0.6 MG/DL
CREAT SERPL-MCNC: 0.6 MG/DL
DIFFERENTIAL METHOD: ABNORMAL
EOSINOPHIL # BLD AUTO: 0 K/UL
EOSINOPHIL NFR BLD: 0 %
ERYTHROCYTE [DISTWIDTH] IN BLOOD BY AUTOMATED COUNT: 15.3 %
EST. GFR  (AFRICAN AMERICAN): >60 ML/MIN/1.73 M^2
EST. GFR  (AFRICAN AMERICAN): >60 ML/MIN/1.73 M^2
EST. GFR  (NON AFRICAN AMERICAN): >60 ML/MIN/1.73 M^2
EST. GFR  (NON AFRICAN AMERICAN): >60 ML/MIN/1.73 M^2
GLUCOSE SERPL-MCNC: 104 MG/DL
GLUCOSE SERPL-MCNC: 96 MG/DL
HCT VFR BLD AUTO: 24.2 %
HGB BLD-MCNC: 8 G/DL
HYPOCHROMIA BLD QL SMEAR: ABNORMAL
IMM GRANULOCYTES # BLD AUTO: 0 K/UL
IMM GRANULOCYTES NFR BLD AUTO: 0 %
LYMPHOCYTES # BLD AUTO: 0 K/UL
LYMPHOCYTES NFR BLD: 66.7 %
MAGNESIUM SERPL-MCNC: 1.7 MG/DL
MAGNESIUM SERPL-MCNC: 1.7 MG/DL
MCH RBC QN AUTO: 30.2 PG
MCHC RBC AUTO-ENTMCNC: 33.1 G/DL
MCV RBC AUTO: 91 FL
MONOCYTES # BLD AUTO: 0 K/UL
MONOCYTES NFR BLD: 0 %
NEUTROPHILS # BLD AUTO: 0 K/UL
NEUTROPHILS NFR BLD: 33.3 %
NRBC BLD-RTO: 0 /100 WBC
PHOSPHATE SERPL-MCNC: 2.1 MG/DL
PHOSPHATE SERPL-MCNC: 2.9 MG/DL
PLATELET # BLD AUTO: 14 K/UL
PLATELET BLD QL SMEAR: ABNORMAL
PMV BLD AUTO: 11.8 FL
POTASSIUM SERPL-SCNC: 3.1 MMOL/L
POTASSIUM SERPL-SCNC: 3.6 MMOL/L
PROT SERPL-MCNC: 5.1 G/DL
PROT SERPL-MCNC: 5.4 G/DL
RBC # BLD AUTO: 2.65 M/UL
SODIUM SERPL-SCNC: 140 MMOL/L
SODIUM SERPL-SCNC: 140 MMOL/L
WBC # BLD AUTO: 0.03 K/UL

## 2018-06-28 PROCEDURE — 25000003 PHARM REV CODE 250: Performed by: INTERNAL MEDICINE

## 2018-06-28 PROCEDURE — 80053 COMPREHEN METABOLIC PANEL: CPT | Mod: 91

## 2018-06-28 PROCEDURE — 20600001 HC STEP DOWN PRIVATE ROOM

## 2018-06-28 PROCEDURE — 80053 COMPREHEN METABOLIC PANEL: CPT

## 2018-06-28 PROCEDURE — 63600175 PHARM REV CODE 636 W HCPCS: Performed by: INTERNAL MEDICINE

## 2018-06-28 PROCEDURE — A4216 STERILE WATER/SALINE, 10 ML: HCPCS | Performed by: INTERNAL MEDICINE

## 2018-06-28 PROCEDURE — 63600175 PHARM REV CODE 636 W HCPCS: Performed by: NURSE PRACTITIONER

## 2018-06-28 PROCEDURE — 63600175 PHARM REV CODE 636 W HCPCS: Performed by: STUDENT IN AN ORGANIZED HEALTH CARE EDUCATION/TRAINING PROGRAM

## 2018-06-28 PROCEDURE — 82330 ASSAY OF CALCIUM: CPT

## 2018-06-28 PROCEDURE — 84100 ASSAY OF PHOSPHORUS: CPT | Mod: 91

## 2018-06-28 PROCEDURE — 85025 COMPLETE CBC W/AUTO DIFF WBC: CPT

## 2018-06-28 PROCEDURE — 25000003 PHARM REV CODE 250: Performed by: STUDENT IN AN ORGANIZED HEALTH CARE EDUCATION/TRAINING PROGRAM

## 2018-06-28 PROCEDURE — C9113 INJ PANTOPRAZOLE SODIUM, VIA: HCPCS | Performed by: NURSE PRACTITIONER

## 2018-06-28 PROCEDURE — 83735 ASSAY OF MAGNESIUM: CPT

## 2018-06-28 PROCEDURE — 25000003 PHARM REV CODE 250: Performed by: NURSE PRACTITIONER

## 2018-06-28 PROCEDURE — 99233 SBSQ HOSP IP/OBS HIGH 50: CPT | Mod: ,,, | Performed by: INTERNAL MEDICINE

## 2018-06-28 RX ORDER — POTASSIUM CHLORIDE 7.45 MG/ML
10 INJECTION INTRAVENOUS
Status: COMPLETED | OUTPATIENT
Start: 2018-06-28 | End: 2018-06-28

## 2018-06-28 RX ORDER — VORICONAZOLE 200 MG/1
200 TABLET, FILM COATED ORAL 2 TIMES DAILY
Status: DISCONTINUED | OUTPATIENT
Start: 2018-06-28 | End: 2018-07-13

## 2018-06-28 RX ORDER — POTASSIUM CHLORIDE 20 MEQ/1
40 TABLET, EXTENDED RELEASE ORAL ONCE
Status: DISCONTINUED | OUTPATIENT
Start: 2018-06-28 | End: 2018-06-28

## 2018-06-28 RX ADMIN — CIPROFLOXACIN 400 MG: 2 INJECTION, SOLUTION INTRAVENOUS at 08:06

## 2018-06-28 RX ADMIN — LORAZEPAM 0.5 MG: 2 INJECTION INTRAMUSCULAR; INTRAVENOUS at 08:06

## 2018-06-28 RX ADMIN — ESCITALOPRAM 10 MG: 5 TABLET, FILM COATED ORAL at 08:06

## 2018-06-28 RX ADMIN — Medication 800 MG: at 08:06

## 2018-06-28 RX ADMIN — POTASSIUM CHLORIDE 10 MEQ: 10 INJECTION, SOLUTION INTRAVENOUS at 06:06

## 2018-06-28 RX ADMIN — VITAMIN D, TAB 1000IU (100/BT) 5000 UNITS: 25 TAB at 08:06

## 2018-06-28 RX ADMIN — Medication 10 ML: at 05:06

## 2018-06-28 RX ADMIN — PROCHLORPERAZINE EDISYLATE 10 MG: 5 INJECTION INTRAMUSCULAR; INTRAVENOUS at 06:06

## 2018-06-28 RX ADMIN — DEXTROSE 310 MG: 5 SOLUTION INTRAVENOUS at 09:06

## 2018-06-28 RX ADMIN — METOPROLOL TARTRATE 25 MG: 25 TABLET, FILM COATED ORAL at 08:06

## 2018-06-28 RX ADMIN — DEXTROSE 40 MG: 50 INJECTION, SOLUTION INTRAVENOUS at 08:06

## 2018-06-28 RX ADMIN — MAGNESIUM SULFATE IN WATER 2 G: 40 INJECTION, SOLUTION INTRAVENOUS at 05:06

## 2018-06-28 RX ADMIN — PROCHLORPERAZINE EDISYLATE 10 MG: 5 INJECTION INTRAMUSCULAR; INTRAVENOUS at 05:06

## 2018-06-28 RX ADMIN — CIPROFLOXACIN 400 MG: 2 INJECTION, SOLUTION INTRAVENOUS at 09:06

## 2018-06-28 RX ADMIN — POTASSIUM CHLORIDE 10 MEQ: 10 INJECTION, SOLUTION INTRAVENOUS at 10:06

## 2018-06-28 RX ADMIN — PROCHLORPERAZINE EDISYLATE 10 MG: 5 INJECTION INTRAMUSCULAR; INTRAVENOUS at 11:06

## 2018-06-28 RX ADMIN — ACYCLOVIR SODIUM 200 MG: 50 INJECTION, SOLUTION INTRAVENOUS at 08:06

## 2018-06-28 RX ADMIN — FILGRASTIM 480 MCG: 480 INJECTION, SOLUTION INTRAVENOUS; SUBCUTANEOUS at 08:06

## 2018-06-28 RX ADMIN — ALPRAZOLAM 0.25 MG: 0.25 TABLET ORAL at 06:06

## 2018-06-28 RX ADMIN — MAGNESIUM SULFATE IN WATER 2 G: 40 INJECTION, SOLUTION INTRAVENOUS at 06:06

## 2018-06-28 RX ADMIN — POTASSIUM CHLORIDE 10 MEQ: 10 INJECTION, SOLUTION INTRAVENOUS at 07:06

## 2018-06-28 RX ADMIN — SODIUM PHOSPHATE, MONOBASIC, MONOHYDRATE 20.01 MMOL: 276; 142 INJECTION, SOLUTION INTRAVENOUS at 12:06

## 2018-06-28 RX ADMIN — Medication 10 ML: at 11:06

## 2018-06-28 RX ADMIN — ALPRAZOLAM 0.25 MG: 0.25 TABLET ORAL at 08:06

## 2018-06-28 RX ADMIN — SODIUM PHOSPHATE, MONOBASIC, MONOHYDRATE 20.01 MMOL: 276; 142 INJECTION, SOLUTION INTRAVENOUS at 06:06

## 2018-06-28 RX ADMIN — POTASSIUM CHLORIDE 10 MEQ: 10 INJECTION, SOLUTION INTRAVENOUS at 08:06

## 2018-06-28 RX ADMIN — VORICONAZOLE 200 MG: 200 TABLET ORAL at 08:06

## 2018-06-28 NOTE — ASSESSMENT & PLAN NOTE
- blood cultures from 6/11 at 0430 with gram + cocci, staph epidermis sensitive for Vancomycin, CXR and UA unremarkable.   - surveillance blood cultures repeated 6/13 NGTD  - de-escalated aztreonam back to prophylactic cipro 6/16  - ID consulted on 6/19, rec continue Vanc for 2 weeks post last negative culture (EOT 6/27)  - BC from 6/11 with gram negative rods, leptotrichia goodfellowii  - completed Flagyl po x 5 days (EOT 6/24)  - ID has signed off  - patient remains afebrile

## 2018-06-28 NOTE — PROGRESS NOTES
" [facility]  Adult Nutrition  Progress Note    SUMMARY       Recommendations    Recommendation/Intervention:   1. Continue w/ Regular diet.   2.Encourage PO intake >/= 75% EEN/EPN   3. 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 Small bland meals and boost encouraged.   She is s/p 7&3 and FLAG-JENNIFER. Patient requiring electrolyte replacements  received 1unit PRBC.  Day 14 bone marrow biopsy shows persistent disease  Nutrition Discharge Planning: Regular diet w/ po intake 75% EEN/ EPN + ONS as needed to maintain elevated Kcal/ Protein needs    Nutrition Risk Screen    Nutrition Risk Screen: no indicators present    Nutrition/Diet History    Patient Reported Diet/Restrictions/Preferences: general  Food Preferences: none  Do you have any cultural, spiritual, Yazidism conflicts, given your current situation?: none noted   Food Allergies: NKFA  Factors Affecting Nutritional Intake: decreased appetite, early satiety, nausea/vomiting    Anthropometrics    Temp: 98.3 °F (36.8 °C)  Height Method: Stated  Height: 5' 3" (160 cm)  Height (inches): 63 in  Weight Method: Standard Scale  Weight: 70.1 kg (154 lb 10.4 oz)  Weight (lb): 154.65 lb  Ideal Body Weight (IBW), Female: 115 lb  % Ideal Body Weight, Female (lb): 134.48 lb  BMI (Calculated): 27.5  BMI Grade: 25 - 29.9 - overweight  Usual Body Weight (UBW), k.5 kg  % Usual Body Weight: 93.11  % " Weight Change From Usual Weight: -7.09 %       Lab/Procedures/Meds    Pertinent Labs Reviewed: reviewed  Pertinent Labs Comments: WBC-0.02, H/H-6.5/19.6, Plts-21, Phos-2.1, Mg-1.1, K-2.7, BUN-2, Ca-6.4, Alb-2.2,ALk Phos- 640  Pertinent Medications Reviewed: reviewed  Pertinent Medications Comments: IVF, Acyclovir, Ca, Cipro, Heparin, Mg, Zofran, Pantoprazole, Vanc, Vori, Vit-D    Physical Findings/Assessment    Overall Physical Appearance: lethargic, overweight, weak  Tubes:  (PICC line access)  Oral/Mouth Cavity: WDL  Skin:  (Rash- Labia)    Estimated/Assessed Needs    Weight Used For Calorie Calculations: 71.7 kg (158 lb 1.1 oz) (+13.8 L fl)  Energy Calorie Requirements (kcal): 2151-2510kcal/d  Energy Need Method: Kcal/kg (30-35kcal/kg)  Protein Requirements: 108-144g/d (1.5-1.8g/kg)  Weight Used For Protein Calculations: 71.7 kg (158 lb 1.1 oz)  Fluid Requirements (mL): 1ml per KCal or Per MD  Fluid Need Method: RDA Method  RDA Method (mL): 2151  CHO Requirement: NA      Nutrition Prescription Ordered    Current Diet Order: Regular  Oral Nutrition Supplement: denied 2/2 taste    Evaluation of Received Nutrient/Fluid Intake    IV Fluid (mL): 2400  I/O: -187ml since admit  Energy Calories Required: not meeting needs  Protein Required: not meeting needs  Fluid Required: meeting needs  Comments: LBM:6/25/18  % Intake of Estimated Energy Needs: 25 - 50 %  % Meal Intake: 25 - 50 %    Nutrition Risk    Level of Risk/Frequency of Follow-up: moderate     Assessment and Plan    Nutrition Problem  Inadequate oral intake    Related to (etiology):   Decreased appetite, Nausea    Signs and Symptoms (as evidenced by):   PO intake 25-50%    Interventions/Recommendations (treatment strategy):  1. Continue w/ Regular diet.   2.Encourage PO intake >/= 75% EEN/EPN   3. If PO intake is <50% encourage HVP w/ each meal, small frequent meal, snacks, ONS   4.RD to follow    Nutrition Diagnosis Status:   Continues           Monitor and  Evaluation    Food and Nutrient Intake: energy intake, food and beverage intake  Food and Nutrient Administration: diet order  Physical Activity and Function: nutrition-related ADLs and IADLs  Anthropometric Measurements: weight, weight change  Biochemical Data, Medical Tests and Procedures:  (LBM: 5/31/18)  Nutrition-Focused Physical Findings: overall appearance     Nutrition Follow-Up    RD Follow-up?: Yes

## 2018-06-28 NOTE — ASSESSMENT & PLAN NOTE
--Corrected calcium wnl 9.3 today.  --patient refusing scheduled po calcium replacement due to nausea

## 2018-06-28 NOTE — ASSESSMENT & PLAN NOTE
- Admitted from 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 Ordered routine labs in addition to FLT3, NGS, and AML FISH.  - lo res HLA typing on 5/26/18 and hi res on 5/27/18 done in anticipation of possible need for future transplant   - Pt with two daughters, two full sisters (in their mid 60s, one with brain aneurysm hx, other one without any medical issues), and one full brother (61 y/o healthy).  - ECHO ordered 5/25/18, EF 65%  - PICC line placed 5/25/18   - initially on hydrea, discontinued on 5/28.   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  - started on induction therapy on 5/29/18.  - Day 31 of 7+3.    - allopurinol stopped 6/11  - prophylactic antimicrobials: Acyclovir, Voriconazole  - will need CNS Prophylaxis given her elevated WBC (high risk) on admission after count recovery.  - NPM1 +, CEBPA (-), FLT3 (+).  On Midostaurin 50 mg PO BID until Day 14 (held starting 6/8 to 6/11). Stopped when FLAG JENNIFER re-induction started. Restarted Midostaurin at 25 mg bid on day 8 of FLAG JENNIFER induction (6/22), increase to full dose 50 mg bid today (6/26) as improvement in liver enzymes.  - day 14 bone marrow biopsy completed 6/11 showing persistent disease with 15% CD 34 positive blasts  - Day 14 of FLAG-JENNIFER, tolerating without difficulty except nausea/vomiting  - repeat 2D echo on 6/15 with preserved EF of 60%  - plan for day 14 restaging bone marrow biopsy on 6/29

## 2018-06-28 NOTE — PROGRESS NOTES
[facility]  Hematology  Bone Marrow Transplant  Progress Note    Patient Name: Noreen Mac  Admission Date: 5/24/2018  Hospital Length of Stay: 35 days  Code Status: Full Code    Subjective:     Interval History:   Day 31 of 7+3 and Day 14 of FLAG JENNIFER. Nausea improved with scheduled Compazine. Patient has agreed to start converting some IV meds to po. VSS, afebrile, electrolytes wnl today. Only complains of fatigue, new rash noted to upper back and chest and legs, papular rash mildly red.    Objective:     Vital Signs (Most Recent):  Temp: 97.7 °F (36.5 °C) (06/28/18 1137)  Pulse: 78 (06/28/18 1137)  Resp: 20 (06/28/18 1137)  BP: 138/77 (06/28/18 1137)  SpO2: (!) 94 % (06/28/18 1137) Vital Signs (24h Range):  Temp:  [97.7 °F (36.5 °C)-99.5 °F (37.5 °C)] 97.7 °F (36.5 °C)  Pulse:  [76-87] 78  Resp:  [18-20] 20  SpO2:  [93 %-94 %] 94 %  BP: (120-141)/(57-77) 138/77     Weight: 70.1 kg (154 lb 10.4 oz)  Body mass index is 27.4 kg/m².  Body surface area is 1.77 meters squared.      Intake/Output - Last 3 Shifts       06/26 0700 - 06/27 0659 06/27 0700 - 06/28 0659 06/28 0700 - 06/29 0659    P.O. 405 1080 600    I.V. (mL/kg) 3260 (46.5) 1483.3 (21.2)     Blood 567      IV Piggyback 1950 950     Total Intake(mL/kg) 6182 (88.2) 3513.3 (50.1) 600 (8.6)    Urine (mL/kg/hr) 5000 (3) 5000 (3) 1125 (2.3)    Emesis/NG output 250 (0.1)      Total Output 5250 5000 1125    Net +932 -1486.7 -525           Stool Occurrence   1 x          Physical Exam   Constitutional: She is oriented to person, place, and time. She appears well-developed and well-nourished. No distress.   HENT:   Mouth/Throat: Oropharynx is clear and moist. No oropharyngeal exudate or posterior oropharyngeal erythema.   Eyes: Conjunctivae are normal. Pupils are equal, round, and reactive to light. No scleral icterus.   Neck: Normal range of motion. Neck supple. No thyromegaly present.   Cardiovascular: Normal rate, regular rhythm, normal  heart sounds and intact distal pulses.    Pulmonary/Chest: Effort normal and breath sounds normal. No respiratory distress.   Abdominal: Soft. Bowel sounds are normal. She exhibits no distension. There is no splenomegaly or hepatomegaly. There is no tenderness.   Musculoskeletal: Normal range of motion. She exhibits no edema or tenderness.   Neurological: She is alert and oriented to person, place, and time. No cranial nerve deficit. Coordination normal.   Skin: No rash noted. No cyanosis. No pallor. Nails show no clubbing.   PICC intact R arm no redness or drainage  Mildly red papular rash to upper back, chest and legs   Psychiatric: She has a normal mood and affect. Thought content normal.   Vitals reviewed.      Significant Labs:   CBC:   Recent Labs  Lab 06/27/18  0345 06/28/18  0456   WBC 0.02* 0.03*   HGB 6.5* 8.0*   HCT 19.6* 24.2*   PLT 21* 14*    and CMP:   Recent Labs  Lab 06/27/18  0345 06/27/18  1744 06/28/18  0456    142  142 140   K 2.7* 3.9  3.9 3.6   * 112*  112* 111*   CO2 23 23  23 21*    102  102 96   BUN 2* 3*  3* 3*   CREATININE 0.5 0.6  0.6 0.6   CALCIUM 6.4* 7.9*  7.9* 7.9*   PROT 4.5* 5.6*  5.6* 5.1*   ALBUMIN 2.2* 2.6*  2.6* 2.3*   BILITOT 0.5 0.8  0.8 0.6   ALKPHOS 640* 725*  725* 607*   AST 12 12  12 12   ALT 10 12  12 8*   ANIONGAP 6* 7*  7* 8   EGFRNONAA >60.0 >60.0  >60.0 >60.0       Diagnostic Results:  None    Assessment/Plan:     * 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 Ordered routine labs in addition to FLT3, NGS, and AML FISH.  - lo res HLA typing on 5/26/18 and hi res on 5/27/18 done in anticipation of possible need for future transplant   - Pt with two daughters, two full sisters (in their mid 60s, one with brain aneurysm hx, other  one without any medical issues), and one full brother (59 y/o healthy).  - ECHO ordered 5/25/18, EF 65%  - PICC line placed 5/25/18   - initially on hydrea, discontinued on 5/28.   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  - started on induction therapy on 5/29/18.  - Day 31 of 7+3.    - allopurinol stopped 6/11  - prophylactic antimicrobials: Acyclovir, Voriconazole  - will need CNS Prophylaxis given her elevated WBC (high risk) on admission after count recovery.  - NPM1 +, CEBPA (-), FLT3 (+).  On Midostaurin 50 mg PO BID until Day 14 (held starting 6/8 to 6/11). Stopped when FLAG JENNIFER re-induction started. Restarted Midostaurin at 25 mg bid on day 8 of FLAG JENNIFER induction (6/22), increase to full dose 50 mg bid today (6/26) as improvement in liver enzymes.  - day 14 bone marrow biopsy completed 6/11 showing persistent disease with 15% CD 34 positive blasts  - Day 14 of FLAG-JENNIFER, tolerating without difficulty except nausea/vomiting  - repeat 2D echo on 6/15 with preserved EF of 60%  - plan for day 14 restaging bone marrow biopsy on 6/29          Staphylococcus epidermidis bacteremia    - blood cultures from 6/11 at 0430 with gram + cocci, staph epidermis sensitive for Vancomycin, CXR and UA unremarkable.   - surveillance blood cultures repeated 6/13 NGTD  - de-escalated aztreonam back to prophylactic cipro 6/16  - ID consulted on 6/19, rec continue Vanc for 2 weeks post last negative culture (EOT 6/27)  - BC from 6/11 with gram negative rods, leptotrichia goodfellowii  - completed Flagyl po x 5 days (EOT 6/24)  - ID has signed off  - patient remains afebrile        Pancytopenia    - secondary to chemotherapy.   - WBC 0.03 and ANC 0, on Neupogen per FLAG JENNIFER protocol  - hgb 8.0 and platelet count 14,000 today  - tranfuse if Hb < 7 g/dL and Plt < 10,000/mm3 or active bleed.              Abnormal liver enzymes    AST and ALT have normalized. Alk phos improved to 640 today.   -   - midostaurin  started day 8 at 25 mg bid, monitoring liver enzymes closely and improved. Increasing Midostaurin to 50 mg bid 6/26  - 6/22/18 liver US showing mild intrahepatic dilation, otherwise impression of hepatic steatosis.  Will hold off of MRCP at this time.          Hypophosphatemia    -phos wnl at 2.9 today        Hypokalemia    - K wnl at 3.6 today          CINV (chemotherapy-induced nausea and vomiting)    - complete olanzapine daily x 6 days  - prn Zofran and Ativan  - improved with scheduled Compazine IV q6hr        Hypocalcemia    --Corrected calcium wnl 9.3 today.  --patient refusing scheduled po calcium replacement due to nausea            Hypomagnesemia    Mg wnl at 1.7 today.          MCTD (mixed connective tissue disease)    - MCTD-NOS  - previously on MTX and Prednisone.   - off therapy now and otherwise doing well.   - can follow up outpatient with her Rheumatologist upon discharge.         Pulmonary nodule    - CXR suggestive of 7 mm slightly irregular shaped radiodensity projected over the left upper lung zone which could represent a calcified granuloma but remains indeterminate.  - CT Chest on 5/28: Multiple scattered noncalcified pulmonary granulomas, the largest measuring 8 mm in the lateral segment of the right middle lobe.   - repeat non-contrast chest CT at 6-12 months         Insomnia    - PRN nightly Xanax.         HTN (hypertension)    - history of SVT and reports intermittent palpitations at times.  - EKG on 5/28 - NSR.  - increased metoprolol XL to 25 mg QD,she feels better with this dose.  - metoprolol tartrate changed on 6/13 to 25 mg bid (hold for SBP <100) due to hypotension  - BP improved        Osteoporosis    - takes prolia once every 6 months, last given mid April 2018   - no acute issue         Depression    - no acute issue   - continue home Escitalopram             VTE Risk Mitigation         Ordered     heparin, porcine (PF) 100 unit/mL injection flush 300 Units  As needed (PRN)       06/15/18 1657     Place MAYLIN hose  Until discontinued      05/25/18 0032     IP VTE HIGH RISK PATIENT  Once      05/25/18 0032          Disposition: continue inpatient supportive care    Sweetie Lepe NP  Bone Marrow Transplant  [facility]

## 2018-06-28 NOTE — SUBJECTIVE & OBJECTIVE
Subjective:     Interval History:   Day 31 of 7+3 and Day 14 of FLAG JENNIFER. Nausea improved with scheduled Compazine. Patient has agreed to start converting some IV meds to po. VSS, afebrile, electrolytes wnl today. Only complains of fatigue, new rash noted to upper back and chest and legs, papular rash mildly red.    Objective:     Vital Signs (Most Recent):  Temp: 97.7 °F (36.5 °C) (06/28/18 1137)  Pulse: 78 (06/28/18 1137)  Resp: 20 (06/28/18 1137)  BP: 138/77 (06/28/18 1137)  SpO2: (!) 94 % (06/28/18 1137) Vital Signs (24h Range):  Temp:  [97.7 °F (36.5 °C)-99.5 °F (37.5 °C)] 97.7 °F (36.5 °C)  Pulse:  [76-87] 78  Resp:  [18-20] 20  SpO2:  [93 %-94 %] 94 %  BP: (120-141)/(57-77) 138/77     Weight: 70.1 kg (154 lb 10.4 oz)  Body mass index is 27.4 kg/m².  Body surface area is 1.77 meters squared.      Intake/Output - Last 3 Shifts       06/26 0700 - 06/27 0659 06/27 0700 - 06/28 0659 06/28 0700 - 06/29 0659    P.O. 405 1080 600    I.V. (mL/kg) 3260 (46.5) 1483.3 (21.2)     Blood 567      IV Piggyback 1950 950     Total Intake(mL/kg) 6182 (88.2) 3513.3 (50.1) 600 (8.6)    Urine (mL/kg/hr) 5000 (3) 5000 (3) 1125 (2.3)    Emesis/NG output 250 (0.1)      Total Output 5250 5000 1125    Net +932 -1486.7 -525           Stool Occurrence   1 x          Physical Exam   Constitutional: She is oriented to person, place, and time. She appears well-developed and well-nourished. No distress.   HENT:   Mouth/Throat: Oropharynx is clear and moist. No oropharyngeal exudate or posterior oropharyngeal erythema.   Eyes: Conjunctivae are normal. Pupils are equal, round, and reactive to light. No scleral icterus.   Neck: Normal range of motion. Neck supple. No thyromegaly present.   Cardiovascular: Normal rate, regular rhythm, normal heart sounds and intact distal pulses.    Pulmonary/Chest: Effort normal and breath sounds normal. No respiratory distress.   Abdominal: Soft. Bowel sounds are normal. She exhibits no distension. There is  no splenomegaly or hepatomegaly. There is no tenderness.   Musculoskeletal: Normal range of motion. She exhibits no edema or tenderness.   Neurological: She is alert and oriented to person, place, and time. No cranial nerve deficit. Coordination normal.   Skin: No rash noted. No cyanosis. No pallor. Nails show no clubbing.   PICC intact R arm no redness or drainage  Mildly red papular rash to upper back, chest and legs   Psychiatric: She has a normal mood and affect. Thought content normal.   Vitals reviewed.      Significant Labs:   CBC:   Recent Labs  Lab 06/27/18  0345 06/28/18  0456   WBC 0.02* 0.03*   HGB 6.5* 8.0*   HCT 19.6* 24.2*   PLT 21* 14*    and CMP:   Recent Labs  Lab 06/27/18  0345 06/27/18  1744 06/28/18  0456    142  142 140   K 2.7* 3.9  3.9 3.6   * 112*  112* 111*   CO2 23 23  23 21*    102  102 96   BUN 2* 3*  3* 3*   CREATININE 0.5 0.6  0.6 0.6   CALCIUM 6.4* 7.9*  7.9* 7.9*   PROT 4.5* 5.6*  5.6* 5.1*   ALBUMIN 2.2* 2.6*  2.6* 2.3*   BILITOT 0.5 0.8  0.8 0.6   ALKPHOS 640* 725*  725* 607*   AST 12 12  12 12   ALT 10 12  12 8*   ANIONGAP 6* 7*  7* 8   EGFRNONAA >60.0 >60.0  >60.0 >60.0       Diagnostic Results:  None

## 2018-06-28 NOTE — ASSESSMENT & PLAN NOTE
- complete olanzapine daily x 6 days  - prn Zofran and Ativan  - improved with scheduled Compazine IV q6hr

## 2018-06-28 NOTE — ASSESSMENT & PLAN NOTE
- secondary to chemotherapy.   - WBC 0.03 and ANC 0, on Neupogen per FLAG JENNIFER protocol  - hgb 8.0 and platelet count 14,000 today  - tranfuse if Hb < 7 g/dL and Plt < 10,000/mm3 or active bleed.

## 2018-06-28 NOTE — PLAN OF CARE
Problem: Patient Care Overview  Goal: Plan of Care Review  Outcome: Outcome(s) achieved Date Met: 06/28/18  Patient remains free from falls and injury this shift. Bed in low, locked position with call bell in reach. Patient encouraged to call for assistance when getting out of bed. Patient verbalized understanding. All belongings within reach. Afebrile - maintained on IV cipro, & acyclovir; transitioned to PO vorisconazole. No c/o nausea since starting scheduled compazine. Neutropenic and thrombocytopenic precautions maintained. IVFs maintained. Mg replacements given as ordered based on AM labs; labs to be re-checked this afternoon. Xanax given for reports of anxiety. BM biopsy scheduled for tomorrow. will continue to monitor.

## 2018-06-29 LAB
ALBUMIN SERPL BCP-MCNC: 2.3 G/DL
ALBUMIN SERPL BCP-MCNC: 2.5 G/DL
ALP SERPL-CCNC: 524 U/L
ALP SERPL-CCNC: 532 U/L
ALT SERPL W/O P-5'-P-CCNC: 7 U/L
ALT SERPL W/O P-5'-P-CCNC: 8 U/L
ANION GAP SERPL CALC-SCNC: 6 MMOL/L
ANION GAP SERPL CALC-SCNC: 6 MMOL/L
ANISOCYTOSIS BLD QL SMEAR: SLIGHT
AST SERPL-CCNC: 11 U/L
AST SERPL-CCNC: 13 U/L
BASOPHILS # BLD AUTO: 0 K/UL
BASOPHILS NFR BLD: 0 %
BILIRUB SERPL-MCNC: 0.7 MG/DL
BILIRUB SERPL-MCNC: 0.8 MG/DL
BLD PROD TYP BPU: NORMAL
BLOOD UNIT EXPIRATION DATE: NORMAL
BLOOD UNIT TYPE CODE: 6200
BLOOD UNIT TYPE: NORMAL
BUN SERPL-MCNC: 3 MG/DL
BUN SERPL-MCNC: 3 MG/DL
CALCIUM SERPL-MCNC: 7.3 MG/DL
CALCIUM SERPL-MCNC: 7.3 MG/DL
CHLORIDE SERPL-SCNC: 111 MMOL/L
CHLORIDE SERPL-SCNC: 111 MMOL/L
CO2 SERPL-SCNC: 23 MMOL/L
CO2 SERPL-SCNC: 24 MMOL/L
CODING SYSTEM: NORMAL
CREAT SERPL-MCNC: 0.5 MG/DL
CREAT SERPL-MCNC: 0.6 MG/DL
DIFFERENTIAL METHOD: ABNORMAL
DISPENSE STATUS: NORMAL
EOSINOPHIL # BLD AUTO: 0 K/UL
EOSINOPHIL NFR BLD: 0 %
ERYTHROCYTE [DISTWIDTH] IN BLOOD BY AUTOMATED COUNT: 14.9 %
EST. GFR  (AFRICAN AMERICAN): >60 ML/MIN/1.73 M^2
EST. GFR  (AFRICAN AMERICAN): >60 ML/MIN/1.73 M^2
EST. GFR  (NON AFRICAN AMERICAN): >60 ML/MIN/1.73 M^2
EST. GFR  (NON AFRICAN AMERICAN): >60 ML/MIN/1.73 M^2
GLUCOSE SERPL-MCNC: 100 MG/DL
GLUCOSE SERPL-MCNC: 93 MG/DL
HCT VFR BLD AUTO: 22.6 %
HGB BLD-MCNC: 7.4 G/DL
IMM GRANULOCYTES # BLD AUTO: 0 K/UL
IMM GRANULOCYTES NFR BLD AUTO: 0 %
LYMPHOCYTES # BLD AUTO: 0 K/UL
LYMPHOCYTES NFR BLD: 50 %
MAGNESIUM SERPL-MCNC: 1.5 MG/DL
MAGNESIUM SERPL-MCNC: 1.8 MG/DL
MCH RBC QN AUTO: 29.8 PG
MCHC RBC AUTO-ENTMCNC: 32.7 G/DL
MCV RBC AUTO: 91 FL
MONOCYTES # BLD AUTO: 0 K/UL
MONOCYTES NFR BLD: 0 %
NEUTROPHILS # BLD AUTO: 0 K/UL
NEUTROPHILS NFR BLD: 50 %
NRBC BLD-RTO: 0 /100 WBC
NUM UNITS TRANS WBC-POOR PLATPHERESIS: NORMAL
OVALOCYTES BLD QL SMEAR: ABNORMAL
PHOSPHATE SERPL-MCNC: 2.1 MG/DL
PHOSPHATE SERPL-MCNC: 2.2 MG/DL
PLATELET # BLD AUTO: 8 K/UL
PLATELET BLD QL SMEAR: ABNORMAL
PMV BLD AUTO: 10 FL
POIKILOCYTOSIS BLD QL SMEAR: SLIGHT
POLYCHROMASIA BLD QL SMEAR: ABNORMAL
POTASSIUM SERPL-SCNC: 3.5 MMOL/L
POTASSIUM SERPL-SCNC: 3.8 MMOL/L
PROT SERPL-MCNC: 5 G/DL
PROT SERPL-MCNC: 5.4 G/DL
RBC # BLD AUTO: 2.48 M/UL
SODIUM SERPL-SCNC: 140 MMOL/L
SODIUM SERPL-SCNC: 141 MMOL/L
WBC # BLD AUTO: 0.02 K/UL

## 2018-06-29 PROCEDURE — 85097 BONE MARROW INTERPRETATION: CPT | Mod: ,,, | Performed by: PATHOLOGY

## 2018-06-29 PROCEDURE — 88237 TISSUE CULTURE BONE MARROW: CPT

## 2018-06-29 PROCEDURE — 88313 SPECIAL STAINS GROUP 2: CPT

## 2018-06-29 PROCEDURE — P9037 PLATE PHERES LEUKOREDU IRRAD: HCPCS

## 2018-06-29 PROCEDURE — 88342 IMHCHEM/IMCYTCHM 1ST ANTB: CPT | Performed by: PATHOLOGY

## 2018-06-29 PROCEDURE — 63600175 PHARM REV CODE 636 W HCPCS: Performed by: NURSE PRACTITIONER

## 2018-06-29 PROCEDURE — 25000003 PHARM REV CODE 250: Performed by: NURSE PRACTITIONER

## 2018-06-29 PROCEDURE — 88311 DECALCIFY TISSUE: CPT | Mod: 26,,, | Performed by: PATHOLOGY

## 2018-06-29 PROCEDURE — 07DR3ZX EXTRACTION OF ILIAC BONE MARROW, PERCUTANEOUS APPROACH, DIAGNOSTIC: ICD-10-PCS | Performed by: INTERNAL MEDICINE

## 2018-06-29 PROCEDURE — 88189 FLOWCYTOMETRY/READ 16 & >: CPT | Mod: ,,, | Performed by: PATHOLOGY

## 2018-06-29 PROCEDURE — 88341 IMHCHEM/IMCYTCHM EA ADD ANTB: CPT | Mod: 26,59,, | Performed by: PATHOLOGY

## 2018-06-29 PROCEDURE — 83735 ASSAY OF MAGNESIUM: CPT | Mod: 91

## 2018-06-29 PROCEDURE — 38222 DX BONE MARROW BX & ASPIR: CPT | Mod: LT,,, | Performed by: NURSE PRACTITIONER

## 2018-06-29 PROCEDURE — 20600001 HC STEP DOWN PRIVATE ROOM

## 2018-06-29 PROCEDURE — 81310 NPM1 GENE: CPT

## 2018-06-29 PROCEDURE — 25000003 PHARM REV CODE 250: Performed by: INTERNAL MEDICINE

## 2018-06-29 PROCEDURE — 88185 FLOWCYTOMETRY/TC ADD-ON: CPT | Performed by: PATHOLOGY

## 2018-06-29 PROCEDURE — 81245 FLT3 GENE: CPT

## 2018-06-29 PROCEDURE — 84100 ASSAY OF PHOSPHORUS: CPT | Mod: 91

## 2018-06-29 PROCEDURE — 36430 TRANSFUSION BLD/BLD COMPNT: CPT

## 2018-06-29 PROCEDURE — 80053 COMPREHEN METABOLIC PANEL: CPT

## 2018-06-29 PROCEDURE — 63600175 PHARM REV CODE 636 W HCPCS: Performed by: INTERNAL MEDICINE

## 2018-06-29 PROCEDURE — 85025 COMPLETE CBC W/AUTO DIFF WBC: CPT

## 2018-06-29 PROCEDURE — 25000003 PHARM REV CODE 250: Performed by: STUDENT IN AN ORGANIZED HEALTH CARE EDUCATION/TRAINING PROGRAM

## 2018-06-29 PROCEDURE — 99233 SBSQ HOSP IP/OBS HIGH 50: CPT | Mod: ,,, | Performed by: INTERNAL MEDICINE

## 2018-06-29 PROCEDURE — C9113 INJ PANTOPRAZOLE SODIUM, VIA: HCPCS | Performed by: NURSE PRACTITIONER

## 2018-06-29 PROCEDURE — 86644 CMV ANTIBODY: CPT

## 2018-06-29 PROCEDURE — 88184 FLOWCYTOMETRY/ TC 1 MARKER: CPT | Performed by: PATHOLOGY

## 2018-06-29 PROCEDURE — 88313 SPECIAL STAINS GROUP 2: CPT | Mod: 26,,, | Performed by: PATHOLOGY

## 2018-06-29 PROCEDURE — 88305 TISSUE EXAM BY PATHOLOGIST: CPT | Performed by: PATHOLOGY

## 2018-06-29 PROCEDURE — 88342 IMHCHEM/IMCYTCHM 1ST ANTB: CPT | Mod: 26,59,, | Performed by: PATHOLOGY

## 2018-06-29 PROCEDURE — 63600175 PHARM REV CODE 636 W HCPCS: Mod: JG | Performed by: INTERNAL MEDICINE

## 2018-06-29 PROCEDURE — 88305 TISSUE EXAM BY PATHOLOGIST: CPT | Mod: 26,,, | Performed by: PATHOLOGY

## 2018-06-29 PROCEDURE — 38221 DX BONE MARROW BIOPSIES: CPT

## 2018-06-29 RX ORDER — LORAZEPAM 2 MG/ML
1 INJECTION INTRAMUSCULAR
Status: DISCONTINUED | OUTPATIENT
Start: 2018-06-29 | End: 2018-07-02

## 2018-06-29 RX ORDER — FENTANYL CITRATE 50 UG/ML
50 INJECTION, SOLUTION INTRAMUSCULAR; INTRAVENOUS
Status: DISCONTINUED | OUTPATIENT
Start: 2018-06-29 | End: 2018-07-02

## 2018-06-29 RX ORDER — LIDOCAINE HYDROCHLORIDE 20 MG/ML
20 INJECTION, SOLUTION INFILTRATION; PERINEURAL
Status: DISCONTINUED | OUTPATIENT
Start: 2018-06-29 | End: 2018-07-02

## 2018-06-29 RX ORDER — POTASSIUM CHLORIDE 14.9 MG/ML
20 INJECTION INTRAVENOUS ONCE
Status: COMPLETED | OUTPATIENT
Start: 2018-06-29 | End: 2018-06-29

## 2018-06-29 RX ADMIN — ACETAMINOPHEN 650 MG: 325 TABLET, FILM COATED ORAL at 05:06

## 2018-06-29 RX ADMIN — DEXTROSE 40 MG: 50 INJECTION, SOLUTION INTRAVENOUS at 08:06

## 2018-06-29 RX ADMIN — VITAMIN D, TAB 1000IU (100/BT) 5000 UNITS: 25 TAB at 08:06

## 2018-06-29 RX ADMIN — FILGRASTIM 480 MCG: 480 INJECTION, SOLUTION INTRAVENOUS; SUBCUTANEOUS at 06:06

## 2018-06-29 RX ADMIN — VORICONAZOLE 200 MG: 200 TABLET ORAL at 08:06

## 2018-06-29 RX ADMIN — MAGNESIUM SULFATE IN WATER 2 G: 40 INJECTION, SOLUTION INTRAVENOUS at 05:06

## 2018-06-29 RX ADMIN — METOPROLOL TARTRATE 25 MG: 25 TABLET, FILM COATED ORAL at 08:06

## 2018-06-29 RX ADMIN — ALPRAZOLAM 0.25 MG: 0.25 TABLET ORAL at 06:06

## 2018-06-29 RX ADMIN — POTASSIUM CHLORIDE 20 MEQ: 200 INJECTION, SOLUTION INTRAVENOUS at 10:06

## 2018-06-29 RX ADMIN — SODIUM CHLORIDE: 0.9 INJECTION, SOLUTION INTRAVENOUS at 09:06

## 2018-06-29 RX ADMIN — LORAZEPAM 0.5 MG: 2 INJECTION INTRAMUSCULAR; INTRAVENOUS at 09:06

## 2018-06-29 RX ADMIN — DIPHENHYDRAMINE HYDROCHLORIDE 12.5 MG: 50 INJECTION, SOLUTION INTRAMUSCULAR; INTRAVENOUS at 05:06

## 2018-06-29 RX ADMIN — SODIUM PHOSPHATE, MONOBASIC, MONOHYDRATE 20.01 MMOL: 276; 142 INJECTION, SOLUTION INTRAVENOUS at 09:06

## 2018-06-29 RX ADMIN — FENTANYL CITRATE 25 MCG: 50 INJECTION, SOLUTION INTRAMUSCULAR; INTRAVENOUS at 01:06

## 2018-06-29 RX ADMIN — PROCHLORPERAZINE EDISYLATE 10 MG: 5 INJECTION INTRAMUSCULAR; INTRAVENOUS at 12:06

## 2018-06-29 RX ADMIN — CIPROFLOXACIN 400 MG: 2 INJECTION, SOLUTION INTRAVENOUS at 08:06

## 2018-06-29 RX ADMIN — LIDOCAINE HYDROCHLORIDE 20 ML: 20 INJECTION, SOLUTION INFILTRATION; PERINEURAL at 01:06

## 2018-06-29 RX ADMIN — ACYCLOVIR SODIUM 200 MG: 50 INJECTION, SOLUTION INTRAVENOUS at 08:06

## 2018-06-29 RX ADMIN — PROCHLORPERAZINE EDISYLATE 10 MG: 5 INJECTION INTRAMUSCULAR; INTRAVENOUS at 05:06

## 2018-06-29 RX ADMIN — MAGNESIUM SULFATE IN WATER 2 G: 40 INJECTION, SOLUTION INTRAVENOUS at 08:06

## 2018-06-29 RX ADMIN — ESCITALOPRAM 10 MG: 5 TABLET, FILM COATED ORAL at 08:06

## 2018-06-29 RX ADMIN — ONDANSETRON 8 MG: 2 INJECTION INTRAMUSCULAR; INTRAVENOUS at 10:06

## 2018-06-29 RX ADMIN — LORAZEPAM 1 MG: 2 INJECTION INTRAMUSCULAR; INTRAVENOUS at 01:06

## 2018-06-29 RX ADMIN — SODIUM CHLORIDE: 0.9 INJECTION, SOLUTION INTRAVENOUS at 06:06

## 2018-06-29 RX ADMIN — PROCHLORPERAZINE EDISYLATE 10 MG: 5 INJECTION INTRAMUSCULAR; INTRAVENOUS at 11:06

## 2018-06-29 RX ADMIN — SODIUM PHOSPHATE, MONOBASIC, MONOHYDRATE 20.01 MMOL: 276; 142 INJECTION, SOLUTION INTRAVENOUS at 06:06

## 2018-06-29 NOTE — PROGRESS NOTES
SW met with pt at bedside to provide support, SW will continue to follow.    Radha Urias [facility]  Oncology Social Worker  Phone: [phone]

## 2018-06-29 NOTE — PROCEDURES
PROCEDURE NOTE:  Bone Marrow Biopsy  Date: 6/29/18   Indication: AML- Day 15 of FLAG JENNIFER  Consent: Informed consent was obtained from patient.  Timeout: Done and documented.  Site: Left posterior illiac crest.  Position: Prone  Prep: Betadine.  Needle used: 11 gauge Jamshidi needle.  Anesthetic: 2% lidocaine 10 cc.  Biopsy: The biopsy needle was introduced into the marrow cavity and an aspirate was obtained without complications and sent for flow cytometry, FLT3, NPM1, and cytogenetics. Core biopsy obtained without difficulty and sent for routine histologic examination.  Complications: None.  Disposition: Inpatient. Pt to lie flat on back x30 minutes. RN to assess BMBX site for bleeding.  Minimal blood loss    Aminata Mckenzie, NURYS, FNP  Hematology/Bone Marrow Transplant

## 2018-06-29 NOTE — SUBJECTIVE & OBJECTIVE
Subjective:     Interval History:   Day 32 of 7+3 and Day 15 of FLAG JENNIFER. Day 14 restaging bone marrow biopsy done at bedside today. Patient states nausea improved but she did have emesis last night after taking 9 pm pills. Rash improved. No mouth sores, diarrhea or pain.    Objective:     Vital Signs (Most Recent):  Temp: 98.5 °F (36.9 °C) (06/29/18 1246)  Pulse: 86 (06/29/18 1246)  Resp: 16 (06/29/18 1246)  BP: 118/61 (06/29/18 1246)  SpO2: (!) 93 % (06/29/18 1246) Vital Signs (24h Range):  Temp:  [97.5 °F (36.4 °C)-98.9 °F (37.2 °C)] 98.5 °F (36.9 °C)  Pulse:  [] 86  Resp:  [16-20] 16  SpO2:  [92 %-95 %] 93 %  BP: (104-140)/(53-74) 118/61     Weight: 72 kg (158 lb 11.7 oz)  Body mass index is 28.12 kg/m².  Body surface area is 1.79 meters squared.    Intake/Output - Last 3 Shifts       06/27 0700 - 06/28 0659 06/28 0700 - 06/29 0659 06/29 0700 - 06/30 0659    P.O. 1080 1880 240    I.V. (mL/kg) 1483.3 (21.2) 2271.7 (31.6) 913.3 (12.7)    IV Piggyback 950 1750 350    Total Intake(mL/kg) 3513.3 (50.1) 5901.7 (82) 1503.3 (20.9)    Urine (mL/kg/hr) 5000 (3) 4425 (2.6) 1900 (4)    Emesis/NG output   200 (0.4)    Total Output 5000 4425 2100    Net -1486.7 +1476.7 -596.7           Stool Occurrence  1 x 0 x    Emesis Occurrence  1 x           Physical Exam   Constitutional: She is oriented to person, place, and time. She appears well-developed and well-nourished. No distress.   HENT:   Mouth/Throat: Oropharynx is clear and moist. No oropharyngeal exudate or posterior oropharyngeal erythema.   Eyes: Conjunctivae are normal. Pupils are equal, round, and reactive to light. No scleral icterus.   Neck: Normal range of motion. Neck supple. No thyromegaly present.   Cardiovascular: Normal rate, regular rhythm, normal heart sounds and intact distal pulses.    Pulmonary/Chest: Effort normal and breath sounds normal. No respiratory distress.   Abdominal: Soft. Bowel sounds are normal. She exhibits no distension. There is no  splenomegaly or hepatomegaly. There is no tenderness.   Musculoskeletal: Normal range of motion. She exhibits no edema or tenderness.   Neurological: She is alert and oriented to person, place, and time. No cranial nerve deficit. Coordination normal.   Skin: No rash noted. No cyanosis. No pallor. Nails show no clubbing.   PICC intact R arm no redness or drainage  Mildly red papular rash to upper back, chest and legs--improved   Psychiatric: She has a normal mood and affect. Thought content normal.   Vitals reviewed.      Significant Labs:   CBC:   Recent Labs  Lab 06/28/18  0456 06/29/18  0307   WBC 0.03* 0.02*   HGB 8.0* 7.4*   HCT 24.2* 22.6*   PLT 14* 8*    and CMP:   Recent Labs  Lab 06/28/18  0456 06/28/18  1622 06/29/18  0307    140 141   K 3.6 3.1* 3.5   * 109 111*   CO2 21* 26 24   GLU 96 104 93   BUN 3* 3* 3*   CREATININE 0.6 0.6 0.6   CALCIUM 7.9* 7.8* 7.3*   PROT 5.1* 5.4* 5.0*   ALBUMIN 2.3* 2.5* 2.3*   BILITOT 0.6 0.7 0.8   ALKPHOS 607* 619* 532*   AST 12 10 11   ALT 8* 9* 8*   ANIONGAP 8 5* 6*   EGFRNONAA >60.0 >60.0 >60.0       Diagnostic Results:  None

## 2018-06-29 NOTE — PLAN OF CARE
Problem: Patient Care Overview  Goal: Plan of Care Review  Outcome: Ongoing (interventions implemented as appropriate)  Pt involved in plan of care and communicating needs throughout shift.   at bedside and involved in care.  Pt up to BSC independently; ambulates in room with SBA; generalized weakness noted.  No c/o pain or discomfort today.  Tolerating regular diet but poor appetite noted; pt tolerated breakfast; continues to c/o intermittent nausea throughout shift; prn zofran admin with little effect; schedule compazine admin without relief; ativan admin with good effect.  Pt is voiding without difficulty; no BM today.  Bone marrow biopsy done this afternoon at bedside; pt tolerated well.  Neutropenic precautions maintained for WBC=0.02; bleeding precautions maintained for plt=8; 1 unit platelets admin overnight.  20mEq KCl, 2g mag sulfate, and 20 mmol Naphos admin for K=3.5, mg=1.8, ph=2.2.  All VSS; no acute events so far this shift.  Pt remaining free from falls or injury throughout shift; bed in lowest position; call light within reach.  Pt instructed to call for assistance as needed.  Q1H rounding done on pt.

## 2018-06-29 NOTE — ASSESSMENT & PLAN NOTE
--Corrected calcium wnl 8.7 today.  --patient refusing scheduled po calcium replacement due to nausea

## 2018-06-29 NOTE — PROGRESS NOTES
[facility]  Hematology  Bone Marrow Transplant  Progress Note    Patient Name: Noreen Mac  Admission Date: 5/24/2018  Hospital Length of Stay: 36 days  Code Status: Full Code    Subjective:     Interval History:   Day 32 of 7+3 and Day 15 of FLAG JENNIFER. Day 14 restaging bone marrow biopsy done at bedside today. Patient states nausea improved but she did have emesis last night after taking 9 pm pills. Rash improved. No mouth sores, diarrhea or pain.    Objective:     Vital Signs (Most Recent):  Temp: 98.5 °F (36.9 °C) (06/29/18 1246)  Pulse: 86 (06/29/18 1246)  Resp: 16 (06/29/18 1246)  BP: 118/61 (06/29/18 1246)  SpO2: (!) 93 % (06/29/18 1246) Vital Signs (24h Range):  Temp:  [97.5 °F (36.4 °C)-98.9 °F (37.2 °C)] 98.5 °F (36.9 °C)  Pulse:  [] 86  Resp:  [16-20] 16  SpO2:  [92 %-95 %] 93 %  BP: (104-140)/(53-74) 118/61     Weight: 72 kg (158 lb 11.7 oz)  Body mass index is 28.12 kg/m².  Body surface area is 1.79 meters squared.    Intake/Output - Last 3 Shifts       06/27 0700 - 06/28 0659 06/28 0700 - 06/29 0659 06/29 0700 - 06/30 0659    P.O. 1080 1880 240    I.V. (mL/kg) 1483.3 (21.2) 2271.7 (31.6) 913.3 (12.7)    IV Piggyback 950 1750 350    Total Intake(mL/kg) 3513.3 (50.1) 5901.7 (82) 1503.3 (20.9)    Urine (mL/kg/hr) 5000 (3) 4425 (2.6) 1900 (4)    Emesis/NG output   200 (0.4)    Total Output 5000 4425 2100    Net -1486.7 +1476.7 -596.7           Stool Occurrence  1 x 0 x    Emesis Occurrence  1 x           Physical Exam   Constitutional: She is oriented to person, place, and time. She appears well-developed and well-nourished. No distress.   HENT:   Mouth/Throat: Oropharynx is clear and moist. No oropharyngeal exudate or posterior oropharyngeal erythema.   Eyes: Conjunctivae are normal. Pupils are equal, round, and reactive to light. No scleral icterus.   Neck: Normal range of motion. Neck supple. No thyromegaly present.   Cardiovascular: Normal rate, regular rhythm, normal heart  sounds and intact distal pulses.    Pulmonary/Chest: Effort normal and breath sounds normal. No respiratory distress.   Abdominal: Soft. Bowel sounds are normal. She exhibits no distension. There is no splenomegaly or hepatomegaly. There is no tenderness.   Musculoskeletal: Normal range of motion. She exhibits no edema or tenderness.   Neurological: She is alert and oriented to person, place, and time. No cranial nerve deficit. Coordination normal.   Skin: No rash noted. No cyanosis. No pallor. Nails show no clubbing.   PICC intact R arm no redness or drainage  Mildly red papular rash to upper back, chest and legs--improved   Psychiatric: She has a normal mood and affect. Thought content normal.   Vitals reviewed.      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 Ordered routine labs in addition to FLT3, NGS, and AML FISH.  - lo res HLA typing on 5/26/18 and hi res on 5/27/18 done in anticipation of possible need for future transplant   - Pt with two daughters, two full sisters (in their mid 60s, one with brain aneurysm hx, other one without any medical issues), and one full brother (59 y/o healthy).  - ECHO ordered 5/25/18,  EF 65%  - PICC line placed 5/25/18   - initially on hydrea, discontinued on 5/28.   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  - started on induction therapy on 5/29/18.  - Day 32 of 7+3.    - allopurinol stopped 6/11  - prophylactic antimicrobials: Acyclovir, Voriconazole  - will need CNS Prophylaxis given her elevated WBC (high risk) on admission after count recovery.  - NPM1 +, CEBPA (-), FLT3 (+).  On Midostaurin 50 mg PO BID until Day 14 (held starting 6/8 to 6/11). Stopped when FLAG JENNIFER re-induction started. Restarted Midostaurin at 25 mg bid on day 8 of FLAG JENNIFER induction (6/22), increase to full dose 50 mg bid today (6/26) as improvement in liver enzymes.  - day 14 bone marrow biopsy completed 6/11 showing persistent disease with 15% CD 34 positive blasts  - Day 15 of FLAG-JENNIFER, tolerating without difficulty except nausea/vomiting  - repeat 2D echo on 6/15 with preserved EF of 60%  - day 14 restaging bone marrow biopsy done today 6/29          Staphylococcus epidermidis bacteremia    - blood cultures from 6/11 at 0430 with gram + cocci, staph epidermis sensitive for Vancomycin, CXR and UA unremarkable.   - surveillance blood cultures repeated 6/13 NGTD  - de-escalated aztreonam back to prophylactic cipro 6/16  - ID consulted on 6/19, rec continue Vanc for 2 weeks post last negative culture (EOT 6/27)  - BC from 6/11 with gram negative rods, leptotrichia goodfellowii  - completed Flagyl po x 5 days (EOT 6/24)  - ID has signed off  - patient remains afebrile        Pancytopenia    - secondary to chemotherapy.   - WBC 0.02 and ANC 0, on Neupogen per FLAG JENNIFER protocol  - hgb 7.4 and platelet count 8,000 today  - tranfuse if Hb < 7 g/dL and Plt < 10,000/mm3 or active bleed.  - will transfuse 1 unit of PRBC today              Abnormal liver enzymes    AST and ALT have normalized. Alk phos improved to 640 today.   -   - midostaurin started day 8 at 25 mg bid, monitoring liver enzymes closely  and improved. Increasing Midostaurin to 50 mg bid 6/26  - 6/22/18 liver US showing mild intrahepatic dilation, otherwise impression of hepatic steatosis.  Will hold off of MRCP at this time.          Hypophosphatemia    -phos 2.2 today  - replace IV per electrolyte protocol        Hypokalemia    - K wnl at 3.5 today, will replace with 20 meq IV potassium  - patient refusing oral potassium replacement due to nausea          CINV (chemotherapy-induced nausea and vomiting)    - complete olanzapine daily x 6 days  - prn Zofran and Ativan  - improved with scheduled Compazine IV q6hr  - patient states emesis is after taking pills on an empty stomach        Hypocalcemia    --Corrected calcium wnl 8.7 today.  --patient refusing scheduled po calcium replacement due to nausea            Hypomagnesemia    - Mg wnl at 1.8 today.   - replace IV per electrolyte protocol          MCTD (mixed connective tissue disease)    - MCTD-NOS  - previously on MTX and Prednisone.   - off therapy now and otherwise doing well.   - can follow up outpatient with her Rheumatologist upon discharge.         Pulmonary nodule    - CXR suggestive of 7 mm slightly irregular shaped radiodensity projected over the left upper lung zone which could represent a calcified granuloma but remains indeterminate.  - CT Chest on 5/28: Multiple scattered noncalcified pulmonary granulomas, the largest measuring 8 mm in the lateral segment of the right middle lobe.   - repeat non-contrast chest CT at 6-12 months         Insomnia    - PRN nightly Xanax.         HTN (hypertension)    - history of SVT and reports intermittent palpitations at times.  - EKG on 5/28 - NSR.  - increased metoprolol XL to 25 mg QD,she feels better with this dose.  - metoprolol tartrate changed on 6/13 to 25 mg bid (hold for SBP <100) due to hypotension  - BP improved        Osteoporosis    - takes prolia once every 6 months, last given mid April 2018   - no acute issue         Depression    -  no acute issue   - continue home Escitalopram             VTE Risk Mitigation         Ordered     heparin, porcine (PF) 100 unit/mL injection flush 300 Units  As needed (PRN)      06/15/18 1657     Place MAYLIN hose  Until discontinued      05/25/18 0032     IP VTE HIGH RISK PATIENT  Once      05/25/18 0032          Disposition: pending today's bone marrow biopsy results and count recovery    Sweetie Lepe NP  Bone Marrow Transplant  [facility]

## 2018-06-29 NOTE — ASSESSMENT & PLAN NOTE
- K wnl at 3.5 today, will replace with 20 meq IV potassium  - patient refusing oral potassium replacement due to nausea

## 2018-06-29 NOTE — ASSESSMENT & PLAN NOTE
- secondary to chemotherapy.   - WBC 0.02 and ANC 0, on Neupogen per FLAG JENNIFER protocol  - hgb 7.4 and platelet count 8,000 today  - tranfuse if Hb < 7 g/dL and Plt < 10,000/mm3 or active bleed.  - will transfuse 1 unit of PRBC today

## 2018-06-29 NOTE — PLAN OF CARE
Problem: Patient Care Overview  Goal: Plan of Care Review  Outcome: Ongoing (interventions implemented as appropriate)  Patient remained free from falls throughout shift, call bell within reach. Patient had one episode of emesis following night time PO medications. IV prn ativan given. No other complaints of pain or discomfort overnight. BMBx today. Vitals stable, will continue to monitor.

## 2018-06-29 NOTE — PLAN OF CARE
MDR's with Dr Guerrier.  Patient is day 15 of FLAG-Maribel and tolerating without major complication.  C/o some n/v.  Restaging BMB to be performed today.  D/c pending count recovery.  Will continue to follow.

## 2018-06-30 LAB
ABO + RH BLD: NORMAL
ALBUMIN SERPL BCP-MCNC: 2.5 G/DL
ALBUMIN SERPL BCP-MCNC: 2.5 G/DL
ALP SERPL-CCNC: 499 U/L
ALP SERPL-CCNC: 514 U/L
ALT SERPL W/O P-5'-P-CCNC: 7 U/L
ALT SERPL W/O P-5'-P-CCNC: 8 U/L
ANION GAP SERPL CALC-SCNC: 7 MMOL/L
ANION GAP SERPL CALC-SCNC: 8 MMOL/L
AST SERPL-CCNC: 10 U/L
AST SERPL-CCNC: 12 U/L
BASOPHILS # BLD AUTO: 0 K/UL
BASOPHILS NFR BLD: 0 %
BILIRUB SERPL-MCNC: 0.7 MG/DL
BILIRUB SERPL-MCNC: 0.7 MG/DL
BLD GP AB SCN CELLS X3 SERPL QL: NORMAL
BUN SERPL-MCNC: 3 MG/DL
BUN SERPL-MCNC: 3 MG/DL
CALCIUM SERPL-MCNC: 7.1 MG/DL
CALCIUM SERPL-MCNC: 7.5 MG/DL
CHLORIDE SERPL-SCNC: 111 MMOL/L
CHLORIDE SERPL-SCNC: 112 MMOL/L
CO2 SERPL-SCNC: 21 MMOL/L
CO2 SERPL-SCNC: 23 MMOL/L
CREAT SERPL-MCNC: 0.6 MG/DL
CREAT SERPL-MCNC: 0.6 MG/DL
DIFFERENTIAL METHOD: ABNORMAL
EOSINOPHIL # BLD AUTO: 0 K/UL
EOSINOPHIL NFR BLD: 0 %
ERYTHROCYTE [DISTWIDTH] IN BLOOD BY AUTOMATED COUNT: 14.8 %
EST. GFR  (AFRICAN AMERICAN): >60 ML/MIN/1.73 M^2
EST. GFR  (AFRICAN AMERICAN): >60 ML/MIN/1.73 M^2
EST. GFR  (NON AFRICAN AMERICAN): >60 ML/MIN/1.73 M^2
EST. GFR  (NON AFRICAN AMERICAN): >60 ML/MIN/1.73 M^2
GLUCOSE SERPL-MCNC: 112 MG/DL
GLUCOSE SERPL-MCNC: 92 MG/DL
HCT VFR BLD AUTO: 22.1 %
HGB BLD-MCNC: 7.3 G/DL
IMM GRANULOCYTES # BLD AUTO: 0 K/UL
IMM GRANULOCYTES NFR BLD AUTO: 0 %
LYMPHOCYTES # BLD AUTO: 0 K/UL
LYMPHOCYTES NFR BLD: 66.7 %
MAGNESIUM SERPL-MCNC: 1.4 MG/DL
MAGNESIUM SERPL-MCNC: 2.1 MG/DL
MCH RBC QN AUTO: 30.3 PG
MCHC RBC AUTO-ENTMCNC: 33 G/DL
MCV RBC AUTO: 92 FL
MONOCYTES # BLD AUTO: 0 K/UL
MONOCYTES NFR BLD: 0 %
NEUTROPHILS # BLD AUTO: 0 K/UL
NEUTROPHILS NFR BLD: 33.3 %
NRBC BLD-RTO: 0 /100 WBC
PHOSPHATE SERPL-MCNC: 1.8 MG/DL
PHOSPHATE SERPL-MCNC: 2.7 MG/DL
PLATELET # BLD AUTO: 29 K/UL
PLATELET BLD QL SMEAR: ABNORMAL
PMV BLD AUTO: 10 FL
POTASSIUM SERPL-SCNC: 3.4 MMOL/L
POTASSIUM SERPL-SCNC: 3.4 MMOL/L
PROT SERPL-MCNC: 5.4 G/DL
PROT SERPL-MCNC: 5.5 G/DL
RBC # BLD AUTO: 2.41 M/UL
SODIUM SERPL-SCNC: 140 MMOL/L
SODIUM SERPL-SCNC: 142 MMOL/L
WBC # BLD AUTO: 0.03 K/UL

## 2018-06-30 PROCEDURE — 25000003 PHARM REV CODE 250: Performed by: STUDENT IN AN ORGANIZED HEALTH CARE EDUCATION/TRAINING PROGRAM

## 2018-06-30 PROCEDURE — 63600175 PHARM REV CODE 636 W HCPCS: Performed by: INTERNAL MEDICINE

## 2018-06-30 PROCEDURE — 86920 COMPATIBILITY TEST SPIN: CPT

## 2018-06-30 PROCEDURE — 80053 COMPREHEN METABOLIC PANEL: CPT

## 2018-06-30 PROCEDURE — A4216 STERILE WATER/SALINE, 10 ML: HCPCS | Performed by: INTERNAL MEDICINE

## 2018-06-30 PROCEDURE — C9113 INJ PANTOPRAZOLE SODIUM, VIA: HCPCS | Performed by: NURSE PRACTITIONER

## 2018-06-30 PROCEDURE — 25000003 PHARM REV CODE 250: Performed by: INTERNAL MEDICINE

## 2018-06-30 PROCEDURE — 63600175 PHARM REV CODE 636 W HCPCS: Performed by: NURSE PRACTITIONER

## 2018-06-30 PROCEDURE — 85025 COMPLETE CBC W/AUTO DIFF WBC: CPT

## 2018-06-30 PROCEDURE — 25000003 PHARM REV CODE 250: Performed by: NURSE PRACTITIONER

## 2018-06-30 PROCEDURE — 84100 ASSAY OF PHOSPHORUS: CPT

## 2018-06-30 PROCEDURE — 99233 SBSQ HOSP IP/OBS HIGH 50: CPT | Mod: ,,, | Performed by: INTERNAL MEDICINE

## 2018-06-30 PROCEDURE — 83735 ASSAY OF MAGNESIUM: CPT

## 2018-06-30 PROCEDURE — 86901 BLOOD TYPING SEROLOGIC RH(D): CPT

## 2018-06-30 PROCEDURE — 20600001 HC STEP DOWN PRIVATE ROOM

## 2018-06-30 RX ORDER — POTASSIUM CHLORIDE 7.45 MG/ML
10 INJECTION INTRAVENOUS
Status: COMPLETED | OUTPATIENT
Start: 2018-06-30 | End: 2018-06-30

## 2018-06-30 RX ORDER — POTASSIUM CHLORIDE 14.9 MG/ML
20 INJECTION INTRAVENOUS ONCE
Status: DISCONTINUED | OUTPATIENT
Start: 2018-06-30 | End: 2018-06-30

## 2018-06-30 RX ADMIN — SODIUM CHLORIDE: 0.9 INJECTION, SOLUTION INTRAVENOUS at 06:06

## 2018-06-30 RX ADMIN — CIPROFLOXACIN 400 MG: 2 INJECTION, SOLUTION INTRAVENOUS at 08:06

## 2018-06-30 RX ADMIN — POTASSIUM CHLORIDE 10 MEQ: 10 INJECTION, SOLUTION INTRAVENOUS at 06:06

## 2018-06-30 RX ADMIN — Medication 10 ML: at 11:06

## 2018-06-30 RX ADMIN — Medication 10 ML: at 05:06

## 2018-06-30 RX ADMIN — Medication 10 ML: at 06:06

## 2018-06-30 RX ADMIN — POTASSIUM CHLORIDE 10 MEQ: 10 INJECTION, SOLUTION INTRAVENOUS at 08:06

## 2018-06-30 RX ADMIN — PROCHLORPERAZINE EDISYLATE 10 MG: 5 INJECTION INTRAMUSCULAR; INTRAVENOUS at 01:06

## 2018-06-30 RX ADMIN — LORAZEPAM 0.5 MG: 2 INJECTION INTRAMUSCULAR; INTRAVENOUS at 11:06

## 2018-06-30 RX ADMIN — ACYCLOVIR SODIUM 200 MG: 50 INJECTION, SOLUTION INTRAVENOUS at 08:06

## 2018-06-30 RX ADMIN — VORICONAZOLE 200 MG: 200 TABLET ORAL at 08:06

## 2018-06-30 RX ADMIN — HEPARIN 300 UNITS: 100 SYRINGE at 04:06

## 2018-06-30 RX ADMIN — PROCHLORPERAZINE EDISYLATE 10 MG: 5 INJECTION INTRAMUSCULAR; INTRAVENOUS at 05:06

## 2018-06-30 RX ADMIN — METOPROLOL TARTRATE 25 MG: 25 TABLET, FILM COATED ORAL at 08:06

## 2018-06-30 RX ADMIN — VITAMIN D, TAB 1000IU (100/BT) 5000 UNITS: 25 TAB at 08:06

## 2018-06-30 RX ADMIN — ALPRAZOLAM 0.25 MG: 0.25 TABLET ORAL at 11:06

## 2018-06-30 RX ADMIN — PROCHLORPERAZINE EDISYLATE 10 MG: 5 INJECTION INTRAMUSCULAR; INTRAVENOUS at 06:06

## 2018-06-30 RX ADMIN — FILGRASTIM 480 MCG: 480 INJECTION, SOLUTION INTRAVENOUS; SUBCUTANEOUS at 08:06

## 2018-06-30 RX ADMIN — PROCHLORPERAZINE EDISYLATE 10 MG: 5 INJECTION INTRAMUSCULAR; INTRAVENOUS at 11:06

## 2018-06-30 RX ADMIN — LORAZEPAM 0.5 MG: 2 INJECTION INTRAMUSCULAR; INTRAVENOUS at 06:06

## 2018-06-30 RX ADMIN — DEXTROSE 40 MG: 50 INJECTION, SOLUTION INTRAVENOUS at 08:06

## 2018-06-30 RX ADMIN — ALPRAZOLAM 0.25 MG: 0.25 TABLET ORAL at 09:06

## 2018-06-30 RX ADMIN — LORAZEPAM 0.5 MG: 2 INJECTION INTRAMUSCULAR; INTRAVENOUS at 04:06

## 2018-06-30 RX ADMIN — ESCITALOPRAM 10 MG: 5 TABLET, FILM COATED ORAL at 08:06

## 2018-06-30 RX ADMIN — ALPRAZOLAM 0.25 MG: 0.25 TABLET ORAL at 04:06

## 2018-06-30 NOTE — ASSESSMENT & PLAN NOTE
- Admitted from [facility] on 5/25/18 early AM with WBC around 100s, for 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 Ordered routine labs in addition to FLT3, NGS, and AML FISH.  - lo res HLA typing on 5/26/18 and hi res on 5/27/18 done in anticipation of possible need for future transplant   - Pt with two daughters, two full sisters (in their mid 60s, one with brain aneurysm hx, other one without any medical issues), and one full brother (61 y/o healthy).  - ECHO ordered 5/25/18, EF 65%  - PICC line placed 5/25/18   - initially on hydrea, discontinued on 5/28.   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  - started on induction therapy on 5/29/18.  - Day 33 of 7+3.    - allopurinol stopped 6/11  - prophylactic antimicrobials: Acyclovir, Voriconazole  - will need CNS Prophylaxis given her elevated WBC (high risk) on admission after count recovery.  - NPM1 +, CEBPA (-), FLT3 (+).  On Midostaurin 50 mg PO BID until Day 14 (held starting 6/8 to 6/11). Stopped when FLAG JENNIFER re-induction started. Restarted Midostaurin at 25 mg bid on day 8 of FLAG JENNIFER induction (6/22), increase to full dose 50 mg bid (6/26) as improvement in liver enzymes.  - day 14 bone marrow biopsy completed 6/11 showing persistent disease with 15% CD 34 positive blasts  - Day 16 of FLAG-JENNIFER, tolerating without difficulty except nausea/vomiting  - repeat 2D echo on 6/15 with preserved EF of 60%  - day 14 restaging bone marrow biopsy done 6/29 without complication

## 2018-06-30 NOTE — PROGRESS NOTES
[facility]  Hematology  Bone Marrow Transplant  Progress Note    Patient Name: Noreen Mac  Admission Date: 5/24/2018  Hospital Length of Stay: 37 days  Code Status: Full Code    Subjective:     Interval History:   Day 33 of 7+3 and Day 16 of FLAG JENNIFER. Underwent Day 14 restaging bone marrow biopsy yesterday (6/29/18), tolerated well. No active bleeding this AM. Patient noted an anxiety attack yesterday that resolved with ativan. Currently, denies chest pain, palpitations, sob, cough, abdo pain, n/v, diarrhea. Endorses poor po intake and decreased appetite. Reports eating 25% of meals. No other complaints.     Objective:     Vital Signs (Most Recent):  Temp: 98.5 °F (36.9 °C) (06/30/18 0355)  Pulse: 85 (06/30/18 0355)  Resp: 16 (06/30/18 0355)  BP: 120/62 (06/30/18 0355)  SpO2: 96 % (06/30/18 0355) Vital Signs (24h Range):  Temp:  [97.4 °F (36.3 °C)-98.5 °F (36.9 °C)] 98.5 °F (36.9 °C)  Pulse:  [81-86] 85  Resp:  [16-19] 16  SpO2:  [90 %-96 %] 96 %  BP: (102-140)/(56-66) 120/62     Weight: 71.2 kg (156 lb 15.5 oz)  Body mass index is 27.81 kg/m².  Body surface area is 1.78 meters squared.    Intake/Output - Last 3 Shifts       06/28 0700 - 06/29 0659 06/29 0700 - 06/30 0659 06/30 0700 - 07/01 0659    P.O. 1880 1000     I.V. (mL/kg) 2271.7 (31.6) 2626.7 (36.9)     IV Piggyback 1750 850     Total Intake(mL/kg) 5901.7 (82) 4476.7 (62.9)     Urine (mL/kg/hr) 4425 (2.6) 4400 (2.6)     Emesis/NG output  200 (0.1)     Total Output 4425 4600      Net +1476.7 -123.3             Stool Occurrence 1 x 0 x     Emesis Occurrence 1 x            Physical Exam   Constitutional: She is oriented to person, place, and time. She appears well-developed and well-nourished. No distress.   HENT:   Mouth/Throat: Oropharynx is clear and moist. No oropharyngeal exudate or posterior oropharyngeal erythema.   Eyes: Conjunctivae are normal. Pupils are equal, round, and reactive to light. No scleral icterus.   Neck: Normal  range of motion. Neck supple. No thyromegaly present.   Cardiovascular: Normal rate, regular rhythm, normal heart sounds and intact distal pulses.    Pulmonary/Chest: Effort normal and breath sounds normal. No respiratory distress.   Abdominal: Soft. Bowel sounds are normal. She exhibits no distension. There is no splenomegaly or hepatomegaly. There is no tenderness.   Musculoskeletal: Normal range of motion. She exhibits no edema or tenderness.   Neurological: She is alert and oriented to person, place, and time. No cranial nerve deficit. Coordination normal.   Skin: No rash noted. No cyanosis. No pallor. Nails show no clubbing.   PICC intact R arm no redness or drainage  Mildly red papular rash to upper back, chest and legs--improved   Psychiatric: She has a normal mood and affect. Thought content normal.   Vitals reviewed.      Significant Labs:   CBC:     Recent Labs  Lab 06/29/18  0307 06/30/18  0354   WBC 0.02* 0.03*   HGB 7.4* 7.3*   HCT 22.6* 22.1*   PLT 8*  --     and CMP:     Recent Labs  Lab 06/29/18  0307 06/29/18  1606 06/30/18  0354    140 140   K 3.5 3.8 3.4*   * 111* 111*   CO2 24 23 21*   GLU 93 100 92   BUN 3* 3* 3*   CREATININE 0.6 0.5 0.6   CALCIUM 7.3* 7.3* 7.1*   PROT 5.0* 5.4* 5.4*   ALBUMIN 2.3* 2.5* 2.5*   BILITOT 0.8 0.7 0.7   ALKPHOS 532* 524* 499*   AST 11 13 12   ALT 8* 7* 8*   ANIONGAP 6* 6* 8   EGFRNONAA >60.0 >60.0 >60.0       Diagnostic Results:  I have reviewed all pertinent imaging results/findings within the past 24 hours.    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 Ordered routine labs in addition to FLT3, NGS, and AML FISH.  - lo res HLA typing on 5/26/18 and hi res on 5/27/18 done in anticipation of possible need for future transplant    - Pt with two daughters, two full sisters (in their mid 60s, one with brain aneurysm hx, other one without any medical issues), and one full brother (61 y/o healthy).  - ECHO ordered 5/25/18, EF 65%  - PICC line placed 5/25/18   - initially on hydrea, discontinued on 5/28.   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  - started on induction therapy on 5/29/18.  - Day 33 of 7+3.    - allopurinol stopped 6/11  - prophylactic antimicrobials: Acyclovir, Voriconazole  - will need CNS Prophylaxis given her elevated WBC (high risk) on admission after count recovery.  - NPM1 +, CEBPA (-), FLT3 (+).  On Midostaurin 50 mg PO BID until Day 14 (held starting 6/8 to 6/11). Stopped when FLAG JENNIFER re-induction started. Restarted Midostaurin at 25 mg bid on day 8 of FLAG JENNIFER induction (6/22), increase to full dose 50 mg bid (6/26) as improvement in liver enzymes.  - day 14 bone marrow biopsy completed 6/11 showing persistent disease with 15% CD 34 positive blasts  - Day 16 of FLAG-JENNIFER, tolerating without difficulty except nausea/vomiting  - repeat 2D echo on 6/15 with preserved EF of 60%  - day 14 restaging bone marrow biopsy done 6/29 without complication        Hypophosphatemia    - replace IV per electrolyte protocol        Hypokalemia    - K at 3.4 today, will replace with 20 meq IV potassium  - patient refusing oral potassium replacement due to nausea        CINV (chemotherapy-induced nausea and vomiting)    - complete olanzapine daily x 6 days  - prn Zofran and Ativan  - improved with scheduled Compazine IV q6hr  - patient states emesis is after taking pills on an empty stomach        Hypocalcemia    --Corrected calcium 8.3 today.  --patient refusing scheduled po calcium replacement due to nausea        Abnormal liver enzymes    AST and ALT have normalized. Alk phos improved to 499 today.   -   - midostaurin started day 8 at 25 mg bid, monitoring liver enzymes closely and improved. Increasing  Midostaurin to 50 mg bid 6/26  - 6/22/18 liver US showing mild intrahepatic dilation, otherwise impression of hepatic steatosis.  Will hold off of MRCP at this time.          Hypomagnesemia    - replace IV per electrolyte protocol        Pancytopenia    - secondary to chemotherapy.   - WBC 0.03 and ANC 10, on Neupogen per FLAG JENNIFER protocol  - hgb 7.3 and platelet count 29,000 today  - tranfuse if Hb < 7 g/dL and Plt < 10,000/mm3 or active bleed.  - will transfuse         MCTD (mixed connective tissue disease)    - MCTD-NOS  - previously on MTX and Prednisone.   - off therapy now and otherwise doing well.   - can follow up outpatient with her Rheumatologist upon discharge.         Staphylococcus epidermidis bacteremia    - blood cultures from 6/11 at 0430 with gram + cocci, staph epidermis sensitive for Vancomycin, CXR and UA unremarkable.   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  - CT Chest on 5/28: Multiple scattered noncalcified pulmonary granulomas, the largest measuring 8 mm in the lateral segment of the right middle lobe.   - repeat non-contrast chest CT at 6-12 months         Insomnia    - PRN nightly Xanax.         HTN (hypertension)    - history of SVT and reports intermittent palpitations at times.  - EKG on 5/28 - NSR.  - increased metoprolol XL to 25 mg QD,she feels better with this dose.  - metoprolol tartrate changed on 6/13 to 25 mg bid (hold for SBP <100) due to hypotension  - BP  improved        Osteoporosis    - takes prolia once every 6 months, last given mid April 2018   - no acute issue         Depression    - no acute issue   - continue home Escitalopram             VTE Risk Mitigation         Ordered     heparin, porcine (PF) 100 unit/mL injection flush 300 Units  As needed (PRN)      06/15/18 1657     Place MAYLIN hose  Until discontinued      05/25/18 0032     IP VTE HIGH RISK PATIENT  Once      05/25/18 0032          Disposition: pending BMBx results and count recovery    Washington Eastman MD  Bone Marrow Transplant  [facility]

## 2018-06-30 NOTE — ASSESSMENT & PLAN NOTE
AST and ALT have normalized. Alk phos improved to 499 today.   -   - midostaurin started day 8 at 25 mg bid, monitoring liver enzymes closely and improved. Increasing Midostaurin to 50 mg bid 6/26  - 6/22/18 liver US showing mild intrahepatic dilation, otherwise impression of hepatic steatosis.  Will hold off of MRCP at this time.

## 2018-06-30 NOTE — ASSESSMENT & PLAN NOTE
- K at 3.4 today, will replace with 20 meq IV potassium  - patient refusing oral potassium replacement due to nausea

## 2018-06-30 NOTE — SUBJECTIVE & OBJECTIVE
Subjective:     Interval History:   Day 33 of 7+3 and Day 16 of FLAG JENNIFER. Underwent Day 14 restaging bone marrow biopsy yesterday (6/29/18), tolerated well. No active bleeding this AM. Patient noted an anxiety attack yesterday that resolved with ativan. Currently, denies chest pain, palpitations, sob, cough, abdo pain, n/v, diarrhea. Endorses poor po intake and decreased appetite. Reports eating 25% of meals. No other complaints.     Objective:     Vital Signs (Most Recent):  Temp: 98.5 °F (36.9 °C) (06/30/18 0355)  Pulse: 85 (06/30/18 0355)  Resp: 16 (06/30/18 0355)  BP: 120/62 (06/30/18 0355)  SpO2: 96 % (06/30/18 0355) Vital Signs (24h Range):  Temp:  [97.4 °F (36.3 °C)-98.5 °F (36.9 °C)] 98.5 °F (36.9 °C)  Pulse:  [81-86] 85  Resp:  [16-19] 16  SpO2:  [90 %-96 %] 96 %  BP: (102-140)/(56-66) 120/62     Weight: 71.2 kg (156 lb 15.5 oz)  Body mass index is 27.81 kg/m².  Body surface area is 1.78 meters squared.    Intake/Output - Last 3 Shifts       06/28 0700 - 06/29 0659 06/29 0700 - 06/30 0659 06/30 0700 - 07/01 0659    P.O. 1880 1000     I.V. (mL/kg) 2271.7 (31.6) 2626.7 (36.9)     IV Piggyback 1750 850     Total Intake(mL/kg) 5901.7 (82) 4476.7 (62.9)     Urine (mL/kg/hr) 4425 (2.6) 4400 (2.6)     Emesis/NG output  200 (0.1)     Total Output 4425 4600      Net +1476.7 -123.3             Stool Occurrence 1 x 0 x     Emesis Occurrence 1 x            Physical Exam   Constitutional: She is oriented to person, place, and time. She appears well-developed and well-nourished. No distress.   HENT:   Mouth/Throat: Oropharynx is clear and moist. No oropharyngeal exudate or posterior oropharyngeal erythema.   Eyes: Conjunctivae are normal. Pupils are equal, round, and reactive to light. No scleral icterus.   Neck: Normal range of motion. Neck supple. No thyromegaly present.   Cardiovascular: Normal rate, regular rhythm, normal heart sounds and intact distal pulses.    Pulmonary/Chest: Effort normal and breath sounds  normal. No respiratory distress.   Abdominal: Soft. Bowel sounds are normal. She exhibits no distension. There is no splenomegaly or hepatomegaly. There is no tenderness.   Musculoskeletal: Normal range of motion. She exhibits no edema or tenderness.   Neurological: She is alert and oriented to person, place, and time. No cranial nerve deficit. Coordination normal.   Skin: No rash noted. No cyanosis. No pallor. Nails show no clubbing.   PICC intact R arm no redness or drainage  Mildly red papular rash to upper back, chest and legs--improved   Psychiatric: She has a normal mood and affect. Thought content normal.   Vitals reviewed.      Significant Labs:   CBC:     Recent Labs  Lab 06/29/18  0307 06/30/18  0354   WBC 0.02* 0.03*   HGB 7.4* 7.3*   HCT 22.6* 22.1*   PLT 8*  --     and CMP:     Recent Labs  Lab 06/29/18  0307 06/29/18  1606 06/30/18  0354    140 140   K 3.5 3.8 3.4*   * 111* 111*   CO2 24 23 21*   GLU 93 100 92   BUN 3* 3* 3*   CREATININE 0.6 0.5 0.6   CALCIUM 7.3* 7.3* 7.1*   PROT 5.0* 5.4* 5.4*   ALBUMIN 2.3* 2.5* 2.5*   BILITOT 0.8 0.7 0.7   ALKPHOS 532* 524* 499*   AST 11 13 12   ALT 8* 7* 8*   ANIONGAP 6* 6* 8   EGFRNONAA >60.0 >60.0 >60.0       Diagnostic Results:  I have reviewed all pertinent imaging results/findings within the past 24 hours.

## 2018-06-30 NOTE — PLAN OF CARE
Problem: Patient Care Overview  Goal: Plan of Care Review  Outcome: Ongoing (interventions implemented as appropriate)  Day 33 of 7+3 and Day 16 of FLAG JENNIFER.  Pt. with electrolytes replaced today.  Pt. with c/o anxiety with xanax and ativan given PRN.  Pt. With c/o SOB with O2 used PRN throughout shift.  Pt. with nonskid footwear on with bed in lowest position and locked with bed rails up x2.  Pt. ambulates independently and instructed to call if assistance is needed.  Pt. with call light within reach.  Will continue to monitor pt.

## 2018-06-30 NOTE — PLAN OF CARE
Problem: Patient Care Overview  Goal: Plan of Care Review  Outcome: Ongoing (interventions implemented as appropriate)  Patient remained free from falls throughout shift, call bell within reach. Patient became slightly SOB overnight, pulse ox 89% on RA. 1.5L NC applied and pulse ox increased to 96%, patient stated she felt better with O2 on. Dr. Givens notified of respiratory change, stated to continue to monitor for now. No nausea or vomiting reported overnight. Vitals stable, will continue to monitor.

## 2018-06-30 NOTE — ASSESSMENT & PLAN NOTE
- secondary to chemotherapy.   - WBC 0.03 and ANC 0, on Neupogen per [facility] protocol  - hgb 7.3 and platelet count 8,000 today  - tranfuse if Hb < 7 g/dL and Plt < 10,000/mm3 or active bleed.  - will transfuse

## 2018-07-01 LAB
ALBUMIN SERPL BCP-MCNC: 2.4 G/DL
ALBUMIN SERPL BCP-MCNC: 2.5 G/DL
ALP SERPL-CCNC: 437 U/L
ALP SERPL-CCNC: 464 U/L
ALT SERPL W/O P-5'-P-CCNC: 6 U/L
ALT SERPL W/O P-5'-P-CCNC: 7 U/L
ANION GAP SERPL CALC-SCNC: 6 MMOL/L
ANION GAP SERPL CALC-SCNC: 8 MMOL/L
AST SERPL-CCNC: 10 U/L
AST SERPL-CCNC: 9 U/L
BASOPHILS # BLD AUTO: 0 K/UL
BASOPHILS NFR BLD: 0 %
BILIRUB SERPL-MCNC: 0.8 MG/DL
BILIRUB SERPL-MCNC: 0.9 MG/DL
BLD PROD TYP BPU: NORMAL
BLOOD UNIT EXPIRATION DATE: NORMAL
BLOOD UNIT TYPE CODE: 6200
BLOOD UNIT TYPE: NORMAL
BUN SERPL-MCNC: 4 MG/DL
BUN SERPL-MCNC: 4 MG/DL
CALCIUM SERPL-MCNC: 7.2 MG/DL
CALCIUM SERPL-MCNC: 7.9 MG/DL
CHLORIDE SERPL-SCNC: 106 MMOL/L
CHLORIDE SERPL-SCNC: 111 MMOL/L
CO2 SERPL-SCNC: 23 MMOL/L
CO2 SERPL-SCNC: 25 MMOL/L
CODING SYSTEM: NORMAL
CREAT SERPL-MCNC: 0.5 MG/DL
CREAT SERPL-MCNC: 0.5 MG/DL
DIFFERENTIAL METHOD: ABNORMAL
DISPENSE STATUS: NORMAL
EOSINOPHIL # BLD AUTO: 0 K/UL
EOSINOPHIL NFR BLD: 0 %
ERYTHROCYTE [DISTWIDTH] IN BLOOD BY AUTOMATED COUNT: 14.4 %
EST. GFR  (AFRICAN AMERICAN): >60 ML/MIN/1.73 M^2
EST. GFR  (AFRICAN AMERICAN): >60 ML/MIN/1.73 M^2
EST. GFR  (NON AFRICAN AMERICAN): >60 ML/MIN/1.73 M^2
EST. GFR  (NON AFRICAN AMERICAN): >60 ML/MIN/1.73 M^2
GLUCOSE SERPL-MCNC: 102 MG/DL
GLUCOSE SERPL-MCNC: 94 MG/DL
HCT VFR BLD AUTO: 21 %
HGB BLD-MCNC: 6.9 G/DL
IMM GRANULOCYTES # BLD AUTO: 0 K/UL
IMM GRANULOCYTES NFR BLD AUTO: 0 %
LYMPHOCYTES # BLD AUTO: 0 K/UL
LYMPHOCYTES NFR BLD: 75 %
MAGNESIUM SERPL-MCNC: 1.1 MG/DL
MAGNESIUM SERPL-MCNC: 1.5 MG/DL
MCH RBC QN AUTO: 30.4 PG
MCHC RBC AUTO-ENTMCNC: 32.9 G/DL
MCV RBC AUTO: 93 FL
MONOCYTES # BLD AUTO: 0 K/UL
MONOCYTES NFR BLD: 0 %
NEUTROPHILS # BLD AUTO: 0 K/UL
NEUTROPHILS NFR BLD: 25 %
NRBC BLD-RTO: 0 /100 WBC
NUM UNITS TRANS PACKED RBC: NORMAL
PHOSPHATE SERPL-MCNC: 1.9 MG/DL
PHOSPHATE SERPL-MCNC: 2.6 MG/DL
PLATELET # BLD AUTO: 14 K/UL
PLATELET BLD QL SMEAR: ABNORMAL
PMV BLD AUTO: 11.3 FL
POTASSIUM SERPL-SCNC: 3.1 MMOL/L
POTASSIUM SERPL-SCNC: 3.5 MMOL/L
PROT SERPL-MCNC: 5.2 G/DL
PROT SERPL-MCNC: 5.4 G/DL
RBC # BLD AUTO: 2.27 M/UL
SODIUM SERPL-SCNC: 139 MMOL/L
SODIUM SERPL-SCNC: 140 MMOL/L
WBC # BLD AUTO: 0.04 K/UL

## 2018-07-01 PROCEDURE — 83735 ASSAY OF MAGNESIUM: CPT | Mod: 91

## 2018-07-01 PROCEDURE — C9113 INJ PANTOPRAZOLE SODIUM, VIA: HCPCS | Performed by: NURSE PRACTITIONER

## 2018-07-01 PROCEDURE — 63600175 PHARM REV CODE 636 W HCPCS: Performed by: INTERNAL MEDICINE

## 2018-07-01 PROCEDURE — 25000003 PHARM REV CODE 250: Performed by: INTERNAL MEDICINE

## 2018-07-01 PROCEDURE — 25000003 PHARM REV CODE 250: Performed by: NURSE PRACTITIONER

## 2018-07-01 PROCEDURE — 25000003 PHARM REV CODE 250: Performed by: STUDENT IN AN ORGANIZED HEALTH CARE EDUCATION/TRAINING PROGRAM

## 2018-07-01 PROCEDURE — P9040 RBC LEUKOREDUCED IRRADIATED: HCPCS

## 2018-07-01 PROCEDURE — 99233 SBSQ HOSP IP/OBS HIGH 50: CPT | Mod: ,,, | Performed by: INTERNAL MEDICINE

## 2018-07-01 PROCEDURE — 63600175 PHARM REV CODE 636 W HCPCS: Performed by: STUDENT IN AN ORGANIZED HEALTH CARE EDUCATION/TRAINING PROGRAM

## 2018-07-01 PROCEDURE — 84100 ASSAY OF PHOSPHORUS: CPT | Mod: 91

## 2018-07-01 PROCEDURE — 86644 CMV ANTIBODY: CPT

## 2018-07-01 PROCEDURE — 63600175 PHARM REV CODE 636 W HCPCS: Mod: JG | Performed by: INTERNAL MEDICINE

## 2018-07-01 PROCEDURE — 84100 ASSAY OF PHOSPHORUS: CPT

## 2018-07-01 PROCEDURE — A4216 STERILE WATER/SALINE, 10 ML: HCPCS | Performed by: INTERNAL MEDICINE

## 2018-07-01 PROCEDURE — 80053 COMPREHEN METABOLIC PANEL: CPT

## 2018-07-01 PROCEDURE — 63600175 PHARM REV CODE 636 W HCPCS: Performed by: NURSE PRACTITIONER

## 2018-07-01 PROCEDURE — 80048 BASIC METABOLIC PNL TOTAL CA: CPT

## 2018-07-01 PROCEDURE — 85025 COMPLETE CBC W/AUTO DIFF WBC: CPT

## 2018-07-01 PROCEDURE — 20600001 HC STEP DOWN PRIVATE ROOM

## 2018-07-01 RX ORDER — POTASSIUM CHLORIDE 7.45 MG/ML
10 INJECTION INTRAVENOUS
Status: COMPLETED | OUTPATIENT
Start: 2018-07-01 | End: 2018-07-01

## 2018-07-01 RX ORDER — SODIUM CHLORIDE 0.9 % (FLUSH) 0.9 %
5 SYRINGE (ML) INJECTION
Status: DISCONTINUED | OUTPATIENT
Start: 2018-07-01 | End: 2018-07-13 | Stop reason: HOSPADM

## 2018-07-01 RX ORDER — ACETAMINOPHEN 325 MG/1
650 TABLET ORAL
Status: DISCONTINUED | OUTPATIENT
Start: 2018-07-01 | End: 2018-07-02

## 2018-07-01 RX ORDER — FUROSEMIDE 10 MG/ML
20 INJECTION INTRAMUSCULAR; INTRAVENOUS ONCE
Status: COMPLETED | OUTPATIENT
Start: 2018-07-01 | End: 2018-07-01

## 2018-07-01 RX ORDER — HYDROCODONE BITARTRATE AND ACETAMINOPHEN 500; 5 MG/1; MG/1
TABLET ORAL
Status: DISCONTINUED | OUTPATIENT
Start: 2018-07-01 | End: 2018-07-02

## 2018-07-01 RX ORDER — DIPHENHYDRAMINE HCL 25 MG
25 CAPSULE ORAL
Status: DISCONTINUED | OUTPATIENT
Start: 2018-07-01 | End: 2018-07-02

## 2018-07-01 RX ADMIN — POTASSIUM CHLORIDE 10 MEQ: 10 INJECTION, SOLUTION INTRAVENOUS at 08:07

## 2018-07-01 RX ADMIN — CIPROFLOXACIN 400 MG: 2 INJECTION, SOLUTION INTRAVENOUS at 08:07

## 2018-07-01 RX ADMIN — VORICONAZOLE 200 MG: 200 TABLET ORAL at 08:07

## 2018-07-01 RX ADMIN — POTASSIUM CHLORIDE 10 MEQ: 10 INJECTION, SOLUTION INTRAVENOUS at 09:07

## 2018-07-01 RX ADMIN — FUROSEMIDE 20 MG: 10 INJECTION, SOLUTION INTRAVENOUS at 11:07

## 2018-07-01 RX ADMIN — SODIUM PHOSPHATE, MONOBASIC, MONOHYDRATE 20.01 MMOL: 276; 142 INJECTION, SOLUTION INTRAVENOUS at 09:07

## 2018-07-01 RX ADMIN — LORAZEPAM 0.5 MG: 2 INJECTION INTRAMUSCULAR; INTRAVENOUS at 07:07

## 2018-07-01 RX ADMIN — PROCHLORPERAZINE EDISYLATE 10 MG: 5 INJECTION INTRAMUSCULAR; INTRAVENOUS at 05:07

## 2018-07-01 RX ADMIN — METOPROLOL TARTRATE 25 MG: 25 TABLET, FILM COATED ORAL at 08:07

## 2018-07-01 RX ADMIN — POTASSIUM CHLORIDE 10 MEQ: 10 INJECTION, SOLUTION INTRAVENOUS at 06:07

## 2018-07-01 RX ADMIN — ALPRAZOLAM 0.25 MG: 0.25 TABLET ORAL at 09:07

## 2018-07-01 RX ADMIN — LORAZEPAM 0.5 MG: 2 INJECTION INTRAMUSCULAR; INTRAVENOUS at 11:07

## 2018-07-01 RX ADMIN — MAGNESIUM SULFATE IN WATER 2 G: 40 INJECTION, SOLUTION INTRAVENOUS at 05:07

## 2018-07-01 RX ADMIN — ACYCLOVIR SODIUM 200 MG: 50 INJECTION, SOLUTION INTRAVENOUS at 09:07

## 2018-07-01 RX ADMIN — POTASSIUM CHLORIDE 10 MEQ: 10 INJECTION, SOLUTION INTRAVENOUS at 05:07

## 2018-07-01 RX ADMIN — MAGNESIUM SULFATE IN WATER 2 G: 40 INJECTION, SOLUTION INTRAVENOUS at 11:07

## 2018-07-01 RX ADMIN — ESCITALOPRAM 10 MG: 5 TABLET, FILM COATED ORAL at 09:07

## 2018-07-01 RX ADMIN — Medication 10 ML: at 11:07

## 2018-07-01 RX ADMIN — Medication 10 ML: at 05:07

## 2018-07-01 RX ADMIN — CIPROFLOXACIN 400 MG: 2 INJECTION, SOLUTION INTRAVENOUS at 09:07

## 2018-07-01 RX ADMIN — VORICONAZOLE 200 MG: 200 TABLET ORAL at 09:07

## 2018-07-01 RX ADMIN — ALPRAZOLAM 0.25 MG: 0.25 TABLET ORAL at 04:07

## 2018-07-01 RX ADMIN — ACYCLOVIR SODIUM 200 MG: 50 INJECTION, SOLUTION INTRAVENOUS at 08:07

## 2018-07-01 RX ADMIN — PROCHLORPERAZINE EDISYLATE 10 MG: 5 INJECTION INTRAMUSCULAR; INTRAVENOUS at 06:07

## 2018-07-01 RX ADMIN — FILGRASTIM 480 MCG: 480 INJECTION, SOLUTION INTRAVENOUS; SUBCUTANEOUS at 08:07

## 2018-07-01 RX ADMIN — PROCHLORPERAZINE EDISYLATE 10 MG: 5 INJECTION INTRAMUSCULAR; INTRAVENOUS at 12:07

## 2018-07-01 RX ADMIN — PROCHLORPERAZINE EDISYLATE 10 MG: 5 INJECTION INTRAMUSCULAR; INTRAVENOUS at 11:07

## 2018-07-01 RX ADMIN — DEXTROSE 40 MG: 50 INJECTION, SOLUTION INTRAVENOUS at 08:07

## 2018-07-01 RX ADMIN — SODIUM CHLORIDE: 0.9 INJECTION, SOLUTION INTRAVENOUS at 05:07

## 2018-07-01 RX ADMIN — SODIUM PHOSPHATE, MONOBASIC, MONOHYDRATE 15 MMOL: 276; 142 INJECTION, SOLUTION INTRAVENOUS at 07:07

## 2018-07-01 RX ADMIN — HEPARIN 300 UNITS: 100 SYRINGE at 01:07

## 2018-07-01 RX ADMIN — METOPROLOL TARTRATE 25 MG: 25 TABLET, FILM COATED ORAL at 09:07

## 2018-07-01 RX ADMIN — Medication 10 ML: at 06:07

## 2018-07-01 RX ADMIN — VITAMIN D, TAB 1000IU (100/BT) 5000 UNITS: 25 TAB at 09:07

## 2018-07-01 NOTE — ASSESSMENT & PLAN NOTE
--Corrected calcium 8.5 today.  --patient refusing scheduled po calcium replacement due to nausea

## 2018-07-01 NOTE — ASSESSMENT & PLAN NOTE
AST and ALT have normalized. Alk phos improved to 437 today.   -   - midostaurin started day 8 at 25 mg bid, monitoring liver enzymes closely and improved. Increasing Midostaurin to 50 mg bid 6/26  - 6/22/18 liver US showing mild intrahepatic dilation, otherwise impression of hepatic steatosis.  Will hold off of MRCP at this time.

## 2018-07-01 NOTE — ASSESSMENT & PLAN NOTE
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 Ordered routine labs in addition to FLT3, NGS, and AML FISH.  - lo res HLA typing on 5/26/18 and hi res on 5/27/18 done in anticipation of possible need for future transplant   - Pt with two daughters, two full sisters (in their mid 60s, one with brain aneurysm hx, other one without any medical issues), and one full brother (61 y/o healthy).  - ECHO ordered 5/25/18, EF 65%  - PICC line placed 5/25/18   - initially on hydrea, discontinued on 5/28.   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  - started on induction therapy on 5/29/18.  - Day 34 of 7+3.    - allopurinol stopped 6/11  - prophylactic antimicrobials: Acyclovir, Voriconazole  - will need CNS Prophylaxis given her elevated WBC (high risk) on admission after count recovery.  - NPM1 +, CEBPA (-), FLT3 (+).  On Midostaurin 50 mg PO BID until Day 14 (held starting 6/8 to 6/11). Stopped when [facility] re-induction started. Restarted Midostaurin at 25 mg bid on day 8 of [facility] induction (6/22), increase to full dose 50 mg bid (6/26) as improvement in liver enzymes.  - day 14 bone marrow biopsy completed 6/11 showing persistent disease with 15% CD 34 positive blasts  - Day 17 of [facility]ENN[facility], tolerating without difficulty except nausea/vomiting  - repeat 2D echo on 6/15 with preserved EF of 60%  - day 14 restaging bone marrow biopsy done 6/29 without complication

## 2018-07-01 NOTE — PLAN OF CARE
Problem: Patient Care Overview  Goal: Plan of Care Review  Outcome: Ongoing (interventions implemented as appropriate)  Day 34 of 7+3 and Day 17 of [facility]ENN[facility]ER.  Pt. with electrolytes replaced today.  Pt. Received 1 unit of RBC's this morning.  Pt. with c/o anxiety with xanax and ativan given PRN.  Pt. With c/o SOB relieved with lasix.  Pt. With CXR done this morning.  Pt. with nonskid footwear on with bed in lowest position and locked with bed rails up x2.  Pt. ambulates independently and instructed to call if assistance is needed.  Pt. with call light within reach.  Will continue to monitor pt.

## 2018-07-01 NOTE — SUBJECTIVE & OBJECTIVE
Subjective:     Interval History:   Day 33 of 7+3 and Day 17 of [facility] JENN[facility]ER. Underwent Day 14 restaging bone marrow biopsy (6/29/18), tolerated well. No active bleeding this AM. Patient issues with anxiety, relieved with ativan. Patient declined referral to heme/onc psych. Patient was placed on O2 when anxious, denies SOB currently. Currently, denies chest pain, palpitations, cough, abdo pain, n/v, diarrhea. Endorses poor po intake and decreased appetite. Reports eating 25% of meals. No other complaints.     Objective:     Vital Signs (Most Recent):  Temp: 98.6 °F (37 °C) (07/01/18 0637)  Pulse: 84 (07/01/18 0637)  Resp: 20 (07/01/18 0637)  BP: (!) 115/56 (07/01/18 0637)  SpO2: 96 % (07/01/18 0637) Vital Signs (24h Range):  Temp:  [98 °F (36.7 °C)-98.7 °F (37.1 °C)] 98.6 °F (37 °C)  Pulse:  [82-95] 84  Resp:  [20-28] 20  SpO2:  [87 %-96 %] 96 %  BP: (115-150)/(56-76) 115/56     Weight: 71.2 kg (156 lb 15.5 oz)  Body mass index is 27.81 kg/m².  Body surface area is 1.78 meters squared.    Intake/Output - Last 3 Shifts       06/29 0700 - 06/30 0659 06/30 0700 - 07/01 0659    P.O. 1000 600    I.V. (mL/kg) 2626.7 (36.9) 1294.3 (18.2)    Blood  350    IV Piggyback 850 250    Total Intake(mL/kg) 4476.7 (62.9) 2494.3 (35)    Urine (mL/kg/hr) 4400 (2.6) 4500 (2.6)    Emesis/NG output 200 (0.1)     Total Output 4600 4500    Net -123.3 -2005.7          Stool Occurrence 0 x 1 x          Physical Exam   Constitutional: She is oriented to person, place, and time. She appears well-developed and well-nourished. No distress.   HENT:   Mouth/Throat: Oropharynx is clear and moist. No oropharyngeal exudate or posterior oropharyngeal erythema.   Eyes: Conjunctivae are normal. Pupils are equal, round, and reactive to light. No scleral icterus.   Neck: Normal range of motion. Neck supple. No thyromegaly present.   Cardiovascular: Normal rate, regular rhythm, normal heart sounds and intact distal pulses.    Pulmonary/Chest: Effort normal  and breath sounds normal. No respiratory distress.   Abdominal: Soft. Bowel sounds are normal. She exhibits no distension. There is no splenomegaly or hepatomegaly. There is no tenderness.   Musculoskeletal: Normal range of motion. She exhibits no edema or tenderness.   Neurological: She is alert and oriented to person, place, and time. No cranial nerve deficit. Coordination normal.   Skin: No rash noted. No cyanosis. No pallor. Nails show no clubbing.   PICC intact R arm no redness or drainage  Mildly red papular rash to upper back, chest and legs--improved   Psychiatric: She has a normal mood and affect. Thought content normal.   Vitals reviewed.      Significant Labs:   CBC:     Recent Labs  Lab 06/30/18  0354 07/01/18  0322   WBC 0.03* 0.04*   HGB 7.3* 6.9*   HCT 22.1* 21.0*   PLT 29* 14*    and CMP:     Recent Labs  Lab 06/30/18  0354 06/30/18  1724 07/01/18  0322    142 140   K 3.4* 3.4* 3.5   * 112* 111*   CO2 21* 23 23   GLU 92 112* 94   BUN 3* 3* 4*   CREATININE 0.6 0.6 0.5   CALCIUM 7.1* 7.5* 7.2*   PROT 5.4* 5.5* 5.2*   ALBUMIN 2.5* 2.5* 2.4*   BILITOT 0.7 0.7 0.8   ALKPHOS 499* 514* 437*   AST 12 10 10   ALT 8* 7* 6*   ANIONGAP 8 7* 6*   EGFRNONAA >60.0 >60.0 >60.0       Diagnostic Results:  I have reviewed all pertinent imaging results/findings within the past 24 hours.

## 2018-07-01 NOTE — ASSESSMENT & PLAN NOTE
- secondary to chemotherapy.   - WBC 0.04 and ANC 10, on Neupogen per [facility] protocol  - hgb 6.9 and platelet count 14,000 today  - tranfuse if Hb < 7 g/dL and Plt < 10,000/mm3 or active bleed.  - will transfuse 1u prbc

## 2018-07-01 NOTE — PROGRESS NOTES
[facility]  Hematology  Bone Marrow Transplant  Progress Note    Patient Name: Noreen Mac  Admission Date: 5/24/2018  Hospital Length of Stay: 38 days  Code Status: Full Code    Subjective:     Interval History:   Day 33 of 7+3 and Day 17 of [facility]. Underwent Day 14 restaging bone marrow biopsy (6/29/18), tolerated well. No active bleeding this AM. Patient issues with anxiety, relieved with ativan. Patient declined referral to heme/onc psych. Patient was placed on O2 when anxious, denies SOB currently. Currently, denies chest pain, palpitations, cough, abdo pain, n/v, diarrhea. Endorses poor po intake and decreased appetite. Reports eating 25% of meals. No other complaints.     Objective:     Vital Signs (Most Recent):  Temp: 98.6 °F (37 °C) (07/01/18 0637)  Pulse: 84 (07/01/18 0637)  Resp: 20 (07/01/18 0637)  BP: (!) 115/56 (07/01/18 0637)  SpO2: 96 % (07/01/18 0637) Vital Signs (24h Range):  Temp:  [98 °F (36.7 °C)-98.7 °F (37.1 °C)] 98.6 °F (37 °C)  Pulse:  [82-95] 84  Resp:  [20-28] 20  SpO2:  [87 %-96 %] 96 %  BP: (115-150)/(56-76) 115/56     Weight: 71.2 kg (156 lb 15.5 oz)  Body mass index is 27.81 kg/m².  Body surface area is 1.78 meters squared.    Intake/Output - Last 3 Shifts       06/29 0700 - 06/30 0659 06/30 0700 - 07/01 0659    P.O. 1000 600    I.V. (mL/kg) 2626.7 (36.9) 1294.3 (18.2)    Blood  350    IV Piggyback 850 250    Total Intake(mL/kg) 4476.7 (62.9) 2494.3 (35)    Urine (mL/kg/hr) 4400 (2.6) 4500 (2.6)    Emesis/NG output 200 (0.1)     Total Output 4600 4500    Net -123.3 -2005.7          Stool Occurrence 0 x 1 x          Physical Exam   Constitutional: She is oriented to person, place, and time. She appears well-developed and well-nourished. No distress.   HENT:   Mouth/Throat: Oropharynx is clear and moist. No oropharyngeal exudate or posterior oropharyngeal erythema.   Eyes: Conjunctivae are normal. Pupils are equal, round, and reactive to light. No scleral  icterus.   Neck: Normal range of motion. Neck supple. No thyromegaly present.   Cardiovascular: Normal rate, regular rhythm, normal heart sounds and intact distal pulses.    Pulmonary/Chest: Effort normal and breath sounds normal. No respiratory distress.   Abdominal: Soft. Bowel sounds are normal. She exhibits no distension. There is no splenomegaly or hepatomegaly. There is no tenderness.   Musculoskeletal: Normal range of motion. She exhibits no edema or tenderness.   Neurological: She is alert and oriented to person, place, and time. No cranial nerve deficit. Coordination normal.   Skin: No rash noted. No cyanosis. No pallor. Nails show no clubbing.   PICC intact R arm no redness or drainage  Mildly red papular rash to upper back, chest and legs--improved   Psychiatric: She has a normal mood and affect. Thought content normal.   Vitals reviewed.      Significant Labs:   CBC:     Recent Labs  Lab 06/30/18  0354 07/01/18  0322   WBC 0.03* 0.04*   HGB 7.3* 6.9*   HCT 22.1* 21.0*   PLT 29* 14*    and CMP:     Recent Labs  Lab 06/30/18  0354 06/30/18  1724 07/01/18  0322    142 140   K 3.4* 3.4* 3.5   * 112* 111*   CO2 21* 23 23   GLU 92 112* 94   BUN 3* 3* 4*   CREATININE 0.6 0.6 0.5   CALCIUM 7.1* 7.5* 7.2*   PROT 5.4* 5.5* 5.2*   ALBUMIN 2.5* 2.5* 2.4*   BILITOT 0.7 0.7 0.8   ALKPHOS 499* 514* 437*   AST 12 10 10   ALT 8* 7* 6*   ANIONGAP 8 7* 6*   EGFRNONAA >60.0 >60.0 >60.0       Diagnostic Results:  I have reviewed all pertinent imaging results/findings within the past 24 hours.    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 Ordered routine labs in addition to FLT3, NGS, and AML FISH.  - lo res HLA typing on 5/26/18 and hi res on 5/27/18 done in anticipation of possible  need for future transplant   - Pt with two daughters, two full sisters (in their mid 60s, one with brain aneurysm hx, other one without any medical issues), and one full brother (59 y/o healthy).  - ECHO ordered 5/25/18, EF 65%  - PICC line placed 5/25/18   - initially on hydrea, discontinued on 5/28.   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  - started on induction therapy on 5/29/18.  - Day 34 of 7+3.    - allopurinol stopped 6/11  - prophylactic antimicrobials: Acyclovir, Voriconazole  - will need CNS Prophylaxis given her elevated WBC (high risk) on admission after count recovery.  - NPM1 +, CEBPA (-), FLT3 (+).  On Midostaurin 50 mg PO BID until Day 14 (held starting 6/8 to 6/11). Stopped when [facility] re-induction started. Restarted Midostaurin at 25 mg bid on day 8 of [facility] induction (6/22), increase to full dose 50 mg bid (6/26) as improvement in liver enzymes.  - day 14 bone marrow biopsy completed 6/11 showing persistent disease with 15% CD 34 positive blasts  - Day 17 of [facility], tolerating without difficulty except nausea/vomiting  - repeat 2D echo on 6/15 with preserved EF of 60%  - day 14 restaging bone marrow biopsy done 6/29 without complication          Hypophosphatemia    - replace IV per electrolyte protocol        Hypokalemia    - K at 3.5 today, will replace with 20 meq IV potassium  - patient refusing oral potassium replacement due to nausea          CINV (chemotherapy-induced nausea and vomiting)    - complete olanzapine daily x 6 days  - prn Zofran and Ativan  - improved with scheduled Compazine IV q6hr  - patient states emesis is after taking pills on an empty stomach        Hypocalcemia    --Corrected calcium 8.5 today.  --patient refusing scheduled po calcium replacement due to nausea            Abnormal liver enzymes    AST and ALT have normalized. Alk phos improved to 437 today.   -   - midostaurin started day 8 at 25 mg bid, monitoring liver enzymes closely  and improved. Increasing Midostaurin to 50 mg bid 6/26  - 6/22/18 liver US showing mild intrahepatic dilation, otherwise impression of hepatic steatosis.  Will hold off of MRCP at this time.          Hypomagnesemia    - replace IV per electrolyte protocol          Pancytopenia    - secondary to chemotherapy.   - WBC 0.04 and ANC 10, on Neupogen per [facility] protocol  - hgb 6.9 and platelet count 14,000 today  - tranfuse if Hb < 7 g/dL and Plt < 10,000/mm3 or active bleed.  - will transfuse 1u prbc              MCTD (mixed connective tissue disease)    - MCTD-NOS  - previously on MTX and Prednisone.   - off therapy now and otherwise doing well.   - can follow up outpatient with her Rheumatologist upon discharge.         Staphylococcus epidermidis bacteremia    - blood cultures from 6/11 at 0430 with gram + cocci, staph epidermis sensitive for Vancomycin, CXR and UA unremarkable.   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  - CT Chest on 5/28: Multiple scattered noncalcified pulmonary granulomas, the largest measuring 8 mm in the lateral segment of the right middle lobe.   - repeat non-contrast chest CT at 6-12 months         Insomnia    - PRN nightly Xanax.         HTN (hypertension)    - history of SVT and reports intermittent palpitations at times.  - EKG on 5/28 - NSR.  - increased metoprolol XL to 25 mg QD,she feels better with this dose.  - metoprolol tartrate changed on 6/13 to 25 mg bid (hold for SBP  <100) due to hypotension  - BP improved        Osteoporosis    - takes prolia once every 6 months, last given mid April 2018   - no acute issue         Depression    - no acute issue   - continue home Escitalopram             VTE Risk Mitigation         Ordered     heparin, porcine (PF) 100 unit/mL injection flush 300 Units  As needed (PRN)      06/15/18 1657     Place MAYLIN hose  Until discontinued      05/25/18 0032     IP VTE HIGH RISK PATIENT  Once      05/25/18 0032          Disposition: pending BMBx results and count recovery    Washington Eastman MD  Bone Marrow Transplant  [facility]

## 2018-07-01 NOTE — ASSESSMENT & PLAN NOTE
- K at 3.5 today, will replace with 20 meq IV potassium  - patient refusing oral potassium replacement due to nausea

## 2018-07-02 LAB
ALBUMIN SERPL BCP-MCNC: 2.7 G/DL
ALBUMIN SERPL BCP-MCNC: 2.7 G/DL
ALP SERPL-CCNC: 442 U/L
ALP SERPL-CCNC: 456 U/L
ALT SERPL W/O P-5'-P-CCNC: 10 U/L
ALT SERPL W/O P-5'-P-CCNC: 7 U/L
ANION GAP SERPL CALC-SCNC: 6 MMOL/L
ANION GAP SERPL CALC-SCNC: 8 MMOL/L
ANION GAP SERPL CALC-SCNC: 9 MMOL/L
ANISOCYTOSIS BLD QL SMEAR: SLIGHT
AORTIC VALVE REGURGITATION: ABNORMAL
AST SERPL-CCNC: 9 U/L
AST SERPL-CCNC: 9 U/L
BASOPHILS # BLD AUTO: ABNORMAL K/UL
BASOPHILS NFR BLD: 0 %
BILIRUB SERPL-MCNC: 0.8 MG/DL
BILIRUB SERPL-MCNC: 0.9 MG/DL
BLD PROD TYP BPU: NORMAL
BLOOD UNIT EXPIRATION DATE: NORMAL
BLOOD UNIT TYPE CODE: 6200
BLOOD UNIT TYPE: NORMAL
BODY SITE - BONE MARROW: NORMAL
BONE MARROW IRON STAIN COMMENT: NORMAL
BONE MARROW WRIGHT STAIN COMMENT: NORMAL
BUN SERPL-MCNC: 5 MG/DL
BUN SERPL-MCNC: 5 MG/DL
BUN SERPL-MCNC: 6 MG/DL
CALCIUM SERPL-MCNC: 7.5 MG/DL
CALCIUM SERPL-MCNC: 8.3 MG/DL
CALCIUM SERPL-MCNC: 8.8 MG/DL
CHLORIDE SERPL-SCNC: 105 MMOL/L
CHLORIDE SERPL-SCNC: 106 MMOL/L
CHLORIDE SERPL-SCNC: 106 MMOL/L
CLINICAL DIAGNOSIS - BONE MARROW: NORMAL
CO2 SERPL-SCNC: 24 MMOL/L
CO2 SERPL-SCNC: 24 MMOL/L
CO2 SERPL-SCNC: 25 MMOL/L
CODING SYSTEM: NORMAL
CREAT SERPL-MCNC: 0.5 MG/DL
CREAT SERPL-MCNC: 0.6 MG/DL
CREAT SERPL-MCNC: 0.6 MG/DL
DIASTOLIC DYSFUNCTION: YES
DIFFERENTIAL METHOD: ABNORMAL
DISPENSE STATUS: NORMAL
EOSINOPHIL # BLD AUTO: ABNORMAL K/UL
EOSINOPHIL NFR BLD: 0 %
ERYTHROCYTE [DISTWIDTH] IN BLOOD BY AUTOMATED COUNT: 13.8 %
EST. GFR  (AFRICAN AMERICAN): >60 ML/MIN/1.73 M^2
EST. GFR  (NON AFRICAN AMERICAN): >60 ML/MIN/1.73 M^2
ESTIMATED PA SYSTOLIC PRESSURE: 51.16
FLOW CYTOMETRY ANTIBODIES ANALYZED - BONE MARROW: NORMAL
FLOW CYTOMETRY COMMENT - BONE MARROW: NORMAL
FLOW CYTOMETRY INTERPRETATION - BONE MARROW: NORMAL
GLOBAL PERICARDIAL EFFUSION: ABNORMAL
GLUCOSE SERPL-MCNC: 100 MG/DL
GLUCOSE SERPL-MCNC: 105 MG/DL
GLUCOSE SERPL-MCNC: 110 MG/DL
HCT VFR BLD AUTO: 24.2 %
HGB BLD-MCNC: 8.2 G/DL
HYPOCHROMIA BLD QL SMEAR: ABNORMAL
IMM GRANULOCYTES # BLD AUTO: ABNORMAL K/UL
IMM GRANULOCYTES NFR BLD AUTO: ABNORMAL %
LYMPHOCYTES # BLD AUTO: ABNORMAL K/UL
LYMPHOCYTES NFR BLD: 100 %
MAGNESIUM SERPL-MCNC: 1.7 MG/DL
MAGNESIUM SERPL-MCNC: 1.7 MG/DL
MAGNESIUM SERPL-MCNC: 1.8 MG/DL
MCH RBC QN AUTO: 30.6 PG
MCHC RBC AUTO-ENTMCNC: 33.9 G/DL
MCV RBC AUTO: 90 FL
MITRAL VALVE MOBILITY: NORMAL
MITRAL VALVE REGURGITATION: ABNORMAL
MONOCYTES # BLD AUTO: ABNORMAL K/UL
MONOCYTES NFR BLD: 0 %
NEUTROPHILS NFR BLD: 0 %
NRBC BLD-RTO: 0 /100 WBC
NUM UNITS TRANS WBC-POOR PLATPHERESIS: NORMAL
PHOSPHATE SERPL-MCNC: 2.3 MG/DL
PHOSPHATE SERPL-MCNC: 2.9 MG/DL
PHOSPHATE SERPL-MCNC: 3.2 MG/DL
PLATELET # BLD AUTO: 8 K/UL
PLATELET BLD QL SMEAR: ABNORMAL
PMV BLD AUTO: 9.4 FL
POTASSIUM SERPL-SCNC: 3.4 MMOL/L
POTASSIUM SERPL-SCNC: 3.5 MMOL/L
POTASSIUM SERPL-SCNC: 3.9 MMOL/L
PROT SERPL-MCNC: 5.8 G/DL
PROT SERPL-MCNC: 5.9 G/DL
RBC # BLD AUTO: 2.68 M/UL
RETIRED EF AND QEF - SEE NOTES: 30 (ref 55–65)
SODIUM SERPL-SCNC: 137 MMOL/L
SODIUM SERPL-SCNC: 138 MMOL/L
SODIUM SERPL-SCNC: 138 MMOL/L
TRICUSPID VALVE REGURGITATION: ABNORMAL
WBC # BLD AUTO: 0.01 K/UL

## 2018-07-02 PROCEDURE — P9037 PLATE PHERES LEUKOREDU IRRAD: HCPCS

## 2018-07-02 PROCEDURE — 63600175 PHARM REV CODE 636 W HCPCS: Performed by: NURSE PRACTITIONER

## 2018-07-02 PROCEDURE — 83735 ASSAY OF MAGNESIUM: CPT | Mod: 91

## 2018-07-02 PROCEDURE — 63600175 PHARM REV CODE 636 W HCPCS: Performed by: STUDENT IN AN ORGANIZED HEALTH CARE EDUCATION/TRAINING PROGRAM

## 2018-07-02 PROCEDURE — 85027 COMPLETE CBC AUTOMATED: CPT

## 2018-07-02 PROCEDURE — 80053 COMPREHEN METABOLIC PANEL: CPT | Mod: 91

## 2018-07-02 PROCEDURE — 20600001 HC STEP DOWN PRIVATE ROOM

## 2018-07-02 PROCEDURE — 85007 BL SMEAR W/DIFF WBC COUNT: CPT

## 2018-07-02 PROCEDURE — A4216 STERILE WATER/SALINE, 10 ML: HCPCS | Performed by: INTERNAL MEDICINE

## 2018-07-02 PROCEDURE — 25000003 PHARM REV CODE 250: Performed by: INTERNAL MEDICINE

## 2018-07-02 PROCEDURE — 84100 ASSAY OF PHOSPHORUS: CPT | Mod: 91

## 2018-07-02 PROCEDURE — 93306 TTE W/DOPPLER COMPLETE: CPT

## 2018-07-02 PROCEDURE — 99233 SBSQ HOSP IP/OBS HIGH 50: CPT | Mod: ,,, | Performed by: INTERNAL MEDICINE

## 2018-07-02 PROCEDURE — 36430 TRANSFUSION BLD/BLD COMPNT: CPT

## 2018-07-02 PROCEDURE — 25000003 PHARM REV CODE 250: Performed by: NURSE PRACTITIONER

## 2018-07-02 PROCEDURE — 94760 N-INVAS EAR/PLS OXIMETRY 1: CPT

## 2018-07-02 PROCEDURE — 25000003 PHARM REV CODE 250: Performed by: STUDENT IN AN ORGANIZED HEALTH CARE EDUCATION/TRAINING PROGRAM

## 2018-07-02 PROCEDURE — 63600175 PHARM REV CODE 636 W HCPCS: Performed by: INTERNAL MEDICINE

## 2018-07-02 PROCEDURE — 63600175 PHARM REV CODE 636 W HCPCS: Mod: JG | Performed by: INTERNAL MEDICINE

## 2018-07-02 PROCEDURE — 93306 TTE W/DOPPLER COMPLETE: CPT | Mod: 26,,, | Performed by: INTERNAL MEDICINE

## 2018-07-02 RX ORDER — HYDROCODONE BITARTRATE AND ACETAMINOPHEN 500; 5 MG/1; MG/1
TABLET ORAL
Status: DISCONTINUED | OUTPATIENT
Start: 2018-07-02 | End: 2018-07-05

## 2018-07-02 RX ORDER — POTASSIUM CHLORIDE 14.9 MG/ML
20 INJECTION INTRAVENOUS
Status: COMPLETED | OUTPATIENT
Start: 2018-07-02 | End: 2018-07-02

## 2018-07-02 RX ORDER — PANTOPRAZOLE SODIUM 40 MG/1
40 TABLET, DELAYED RELEASE ORAL DAILY
Status: DISCONTINUED | OUTPATIENT
Start: 2018-07-02 | End: 2018-07-13 | Stop reason: HOSPADM

## 2018-07-02 RX ORDER — POTASSIUM CHLORIDE 7.45 MG/ML
10 INJECTION INTRAVENOUS
Status: COMPLETED | OUTPATIENT
Start: 2018-07-02 | End: 2018-07-02

## 2018-07-02 RX ADMIN — SODIUM CHLORIDE: 9 INJECTION, SOLUTION INTRAVENOUS at 10:07

## 2018-07-02 RX ADMIN — FILGRASTIM 480 MCG: 480 INJECTION, SOLUTION INTRAVENOUS; SUBCUTANEOUS at 09:07

## 2018-07-02 RX ADMIN — POTASSIUM CHLORIDE 10 MEQ: 10 INJECTION, SOLUTION INTRAVENOUS at 01:07

## 2018-07-02 RX ADMIN — VORICONAZOLE 200 MG: 200 TABLET ORAL at 09:07

## 2018-07-02 RX ADMIN — PANTOPRAZOLE SODIUM 40 MG: 40 TABLET, DELAYED RELEASE ORAL at 09:07

## 2018-07-02 RX ADMIN — POTASSIUM CHLORIDE 10 MEQ: 10 INJECTION, SOLUTION INTRAVENOUS at 04:07

## 2018-07-02 RX ADMIN — MAGNESIUM SULFATE IN WATER 2 G: 40 INJECTION, SOLUTION INTRAVENOUS at 10:07

## 2018-07-02 RX ADMIN — ALPRAZOLAM 0.25 MG: 0.25 TABLET ORAL at 03:07

## 2018-07-02 RX ADMIN — ESCITALOPRAM 10 MG: 5 TABLET, FILM COATED ORAL at 09:07

## 2018-07-02 RX ADMIN — PROCHLORPERAZINE EDISYLATE 10 MG: 5 INJECTION INTRAMUSCULAR; INTRAVENOUS at 11:07

## 2018-07-02 RX ADMIN — POTASSIUM CHLORIDE 20 MEQ: 200 INJECTION, SOLUTION INTRAVENOUS at 07:07

## 2018-07-02 RX ADMIN — SODIUM PHOSPHATE, MONOBASIC, MONOHYDRATE 15 MMOL: 276; 142 INJECTION, SOLUTION INTRAVENOUS at 11:07

## 2018-07-02 RX ADMIN — CIPROFLOXACIN 400 MG: 2 INJECTION, SOLUTION INTRAVENOUS at 09:07

## 2018-07-02 RX ADMIN — ALPRAZOLAM 0.25 MG: 0.25 TABLET ORAL at 11:07

## 2018-07-02 RX ADMIN — MAGNESIUM SULFATE IN WATER 2 G: 40 INJECTION, SOLUTION INTRAVENOUS at 05:07

## 2018-07-02 RX ADMIN — POTASSIUM CHLORIDE 20 MEQ: 200 INJECTION, SOLUTION INTRAVENOUS at 05:07

## 2018-07-02 RX ADMIN — POTASSIUM CHLORIDE 10 MEQ: 10 INJECTION, SOLUTION INTRAVENOUS at 03:07

## 2018-07-02 RX ADMIN — ALPRAZOLAM 0.25 MG: 0.25 TABLET ORAL at 05:07

## 2018-07-02 RX ADMIN — METOPROLOL TARTRATE 25 MG: 25 TABLET, FILM COATED ORAL at 09:07

## 2018-07-02 RX ADMIN — Medication 10 ML: at 06:07

## 2018-07-02 RX ADMIN — PROCHLORPERAZINE EDISYLATE 10 MG: 5 INJECTION INTRAMUSCULAR; INTRAVENOUS at 05:07

## 2018-07-02 RX ADMIN — POTASSIUM CHLORIDE 10 MEQ: 10 INJECTION, SOLUTION INTRAVENOUS at 06:07

## 2018-07-02 RX ADMIN — ACYCLOVIR SODIUM 200 MG: 50 INJECTION, SOLUTION INTRAVENOUS at 09:07

## 2018-07-02 RX ADMIN — LORAZEPAM 0.5 MG: 2 INJECTION INTRAMUSCULAR; INTRAVENOUS at 07:07

## 2018-07-02 RX ADMIN — Medication 10 ML: at 05:07

## 2018-07-02 RX ADMIN — VITAMIN D, TAB 1000IU (100/BT) 5000 UNITS: 25 TAB at 09:07

## 2018-07-02 NOTE — ASSESSMENT & PLAN NOTE
AST and ALT have normalized. Alk phos stable at 456 today.   -   - midostaurin started day 8 at 25 mg bid, monitoring liver enzymes closely and improved. Increasing Midostaurin to 50 mg bid 6/26  - 6/22/18 liver US showing mild intrahepatic dilation, otherwise impression of hepatic steatosis.  Will hold off of MRCP at this time.

## 2018-07-02 NOTE — ASSESSMENT & PLAN NOTE
- secondary to chemotherapy.   - WBC 0.01 and ANC 0, on Neupogen per [facility] protocol  - hgb 8.2 and platelet count 8,000 today  - tranfuse if Hb < 7 g/dL and Plt < 10,000/mm3 or active bleed.  - will transfuse 1u apheresed platelets today

## 2018-07-02 NOTE — PROGRESS NOTES
[facility]  Hematology  Bone Marrow Transplant  Progress Note    Patient Name: Noreen Mac  Admission Date: 5/24/2018  Hospital Length of Stay: 39 days  Code Status: Full Code    Subjective:     Interval History:   Day 35 of 7+3 and Day 18 of FLAG JENNIFER. Restaging BM bx results pending. Fluid overload over the weekend. Chest x-ray with pulmonary edema. Os sats down to 88%, placed on O2 at 2 L NC, now on RA. Received lasix. Net negative 2.7 L today. Obtaining a repeat 2D echo today. 1 episode of nausea, no emesis. Reports anxiety. Electrolytes improved, afebrile, VSS.     Objective:     Vital Signs (Most Recent):  Temp: 98.7 °F (37.1 °C) (07/02/18 0800)  Pulse: 99 (07/02/18 0800)  Resp: 18 (07/02/18 0800)  BP: 122/65 (07/02/18 0800)  SpO2: (!) 93 % (07/02/18 0800) Vital Signs (24h Range):  Temp:  [97.3 °F (36.3 °C)-98.7 °F (37.1 °C)] 98.7 °F (37.1 °C)  Pulse:  [76-99] 99  Resp:  [18-22] 18  SpO2:  [88 %-95 %] 93 %  BP: (103-156)/(53-78) 122/65     Weight: 71.2 kg (156 lb 15.5 oz)  Body mass index is 27.81 kg/m².  Body surface area is 1.78 meters squared.      Intake/Output - Last 3 Shifts       06/30 0700 - 07/01 0659 07/01 0700 - 07/02 0659 07/02 0700 - 07/03 0659    P.O. 600 1240     I.V. (mL/kg) 2441 (34.3) 582.3 (8.2)     Blood 350      IV Piggyback 250 1000     Total Intake(mL/kg) 3641 (51.1) 2822.3 (39.6)     Urine (mL/kg/hr) 4500 (2.6) 5600 (3.3)     Total Output 4500 5600      Net -859 -2777.7             Urine Occurrence  1 x     Stool Occurrence 1 x 2 x 1 x          Physical Exam   Constitutional: She is oriented to person, place, and time. She appears well-developed and well-nourished. No distress.   HENT:   Mouth/Throat: Oropharynx is clear and moist. No oropharyngeal exudate or posterior oropharyngeal erythema.   Eyes: Conjunctivae are normal. Pupils are equal, round, and reactive to light. No scleral icterus.   Neck: Normal range of motion. Neck supple. No thyromegaly present.    Cardiovascular: Normal rate, regular rhythm, normal heart sounds and intact distal pulses.    Pulmonary/Chest: Effort normal and breath sounds normal. No respiratory distress.   Abdominal: Soft. Bowel sounds are normal. She exhibits no distension. There is no splenomegaly or hepatomegaly. There is no tenderness.   Musculoskeletal: Normal range of motion. She exhibits no edema or tenderness.   Neurological: She is alert and oriented to person, place, and time. No cranial nerve deficit. Coordination normal.   Skin: No rash noted. No cyanosis. No pallor. Nails show no clubbing.   PICC intact R arm no redness or drainage  Mildly red papular rash to upper back, chest and legs--resolved   Psychiatric: She has a normal mood and affect. Thought content normal.   Vitals reviewed.      Significant Labs:   CBC:   Recent Labs  Lab 07/01/18  0322 07/02/18  0431   WBC 0.04* 0.01*   HGB 6.9* 8.2*   HCT 21.0* 24.2*   PLT 14* 8*   , CMP:   Recent Labs  Lab 06/30/18  1724 07/01/18  0322 07/01/18  1606 07/01/18  2353    140 139 138   K 3.4* 3.5 3.1* 3.4*   * 111* 106 106   CO2 23 23 25 24   * 94 102 100   BUN 3* 4* 4* 5*   CREATININE 0.6 0.5 0.5 0.5   CALCIUM 7.5* 7.2* 7.9* 7.5*   PROT 5.5* 5.2* 5.4*  --    ALBUMIN 2.5* 2.4* 2.5*  --    BILITOT 0.7 0.8 0.9  --    ALKPHOS 514* 437* 464*  --    AST 10 10 9*  --    ALT 7* 6* 7*  --    ANIONGAP 7* 6* 8 8   EGFRNONAA >60.0 >60.0 >60.0 >60.0   , Coagulation: No results for input(s): PT, INR, APTT in the last 48 hours., LDH: No results for input(s): LDHCSF, BFSOURCE in the last 48 hours. and Uric Acid No results for input(s): URICACID in the last 48 hours.    Diagnostic Results:  I have reviewed all pertinent imaging results/findings within the past 24 hours.    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 Ordered routine labs in addition to FLT3, NGS, and AML FISH.  - lo res HLA typing on 5/26/18 and hi res on 5/27/18 done in anticipation of possible need for future transplant   - Pt with two daughters, two full sisters (in their mid 60s, one with brain aneurysm hx, other one without any medical issues), and one full brother (59 y/o healthy).  - ECHO ordered 5/25/18, EF 65%  - PICC line placed 5/25/18   - initially on hydrea, discontinued on 5/28.   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  - started on induction therapy on 5/29/18.  - Day 35 of 7+3.    - allopurinol stopped 6/11  - prophylactic antimicrobials: Acyclovir, Voriconazole  - will need CNS Prophylaxis given her elevated WBC (high risk) on admission after count recovery.  - NPM1 +, CEBPA (-), FLT3 (+).  On Midostaurin 50 mg PO BID until Day 14 (held starting 6/8 to 6/11). Stopped when [facility] JENN[facility]ER re-induction started. Restarted Midostaurin at 25 mg bid on day 8 of FLAG JENNIFER induction (6/22), increase to full dose 50 mg bid (6/26) as improvement in liver enzymes.  - day 14 bone marrow biopsy completed 6/11 showing persistent disease with 15% CD 34 positive blasts  - Day 18 of FLAG-JENNIFER, tolerating without difficulty except nausea/vomiting  - repeat 2D echo on 6/15 with preserved EF of 60%  - day 14 restaging bone marrow biopsy done 6/29 without complication, results pending          Staphylococcus epidermidis bacteremia    - blood cultures from 6/11 at 0430 with gram + cocci, staph epidermis sensitive for Vancomycin, CXR and UA unremarkable.   - surveillance blood cultures repeated 6/13 NGTD  - de-escalated aztreonam back to prophylactic cipro 6/16  - ID consulted on 6/19, rec continue Vanc for 2 weeks post last negative culture (EOT 6/27)  - BC from 6/11 with gram negative rods, leptotrichia goodfellowii  - completed Flagyl po x 5 days (EOT 6/24)  - ID has signed off  - patient  remains afebrile        Pancytopenia    - secondary to chemotherapy.   - WBC 0.01 and ANC 0, on Neupogen per [facility]ER protocol  - hgb 8.2 and platelet count 8,000 today  - tranfuse if Hb < 7 g/dL and Plt < 10,000/mm3 or active bleed.  - will transfuse 1u apheresed platelets today              Abnormal liver enzymes    AST and ALT have normalized. Alk phos stable at 456 today.   -   - midostaurin started day 8 at 25 mg bid, monitoring liver enzymes closely and improved. Increasing Midostaurin to 50 mg bid 6/26  - 6/22/18 liver US showing mild intrahepatic dilation, otherwise impression of hepatic steatosis.  Will hold off of MRCP at this time.          Hypophosphatemia    - phos wnl at 2.9 this am  - replace IV per electrolyte protocol        Hypokalemia    - K at 3.54today, replaced with 40 meq IV potassium this am  - patient refusing oral potassium replacement due to nausea          CINV (chemotherapy-induced nausea and vomiting)    - completed olanzapine daily x 6 days  - prn Zofran and Ativan  - improved with scheduled Compazine IV q6hr  - no recent emesis  - continue to convert meds from IV to po        Hypocalcemia    --Corrected calcium 8.7 today.  --patient refusing scheduled po calcium replacement due to nausea            Hypomagnesemia    - Mag 1.7 today  - replace IV per electrolyte protocol          MCTD (mixed connective tissue disease)    - MCTD-NOS  - previously on MTX and Prednisone.   - off therapy now and otherwise doing well.   - can follow up outpatient with her Rheumatologist upon discharge.         Pulmonary nodule    - CXR suggestive of 7 mm slightly irregular shaped radiodensity projected over the left upper lung zone which could represent a calcified granuloma but remains indeterminate.  - CT Chest on 5/28: Multiple scattered noncalcified pulmonary granulomas, the largest measuring 8 mm in the lateral segment of the right middle lobe.   - repeat non-contrast chest CT at 6-12 months          Insomnia    - PRN nightly Xanax.         HTN (hypertension)    - history of SVT and reports intermittent palpitations at times.  - EKG on 5/28 - NSR.  - increased metoprolol XL to 25 mg QD,she feels better with this dose.  - metoprolol tartrate changed on 6/13 to 25 mg bid (hold for SBP <100) due to hypotension  - BP improved        Osteoporosis    - takes prolia once every 6 months, last given mid April 2018   - no acute issue         Depression    - no acute issue   - continue home Escitalopram             VTE Risk Mitigation         Ordered     heparin, porcine (PF) 100 unit/mL injection flush 300 Units  As needed (PRN)      06/15/18 1657     Place MAYLIN hose  Until discontinued      05/25/18 0032     IP VTE HIGH RISK PATIENT  Once      05/25/18 0032          Disposition: pending bone marrow biopsy results and count recovery    Sweetie Lepe NP  Bone Marrow Transplant  [facility]

## 2018-07-02 NOTE — PLAN OF CARE
Problem: Patient Care Overview  Goal: Plan of Care Review  Outcome: Ongoing (interventions implemented as appropriate)  AAOx4, bed in low and locked position, call bell within reach, voids via bedside commode, no falls. One complaint of nausea and multiple complaints of anxiety throughout the night; medication administered. PRN electrolytes IV were administered. Patient's O2 SAT's dropped 88-89% on room air, nasal cannula at 2L was needed to maintain SAT of 92%. No complaints of pain. Patient stable, vitals stable, will continue to monitor.

## 2018-07-02 NOTE — ASSESSMENT & PLAN NOTE
- K at 3.54today, replaced with 40 meq IV potassium this am  - patient refusing oral potassium replacement due to nausea

## 2018-07-02 NOTE — SUBJECTIVE & OBJECTIVE
Subjective:     Interval History:   Day 35 of 7+3 and Day 18 of FLAG JENNIFER. Restaging BM bx results pending. Fluid overload over the weekend. Chest x-ray with pulmonary edema. Os sats down to 88%, placed on O2 at 2 L NC, now on RA. Received lasix. Net negative 2.7 L today. Obtaining a repeat 2D echo today. 1 episode of nausea, no emesis. Reports anxiety. Electrolytes improved, afebrile, VSS.     Objective:     Vital Signs (Most Recent):  Temp: 98.7 °F (37.1 °C) (07/02/18 0800)  Pulse: 99 (07/02/18 0800)  Resp: 18 (07/02/18 0800)  BP: 122/65 (07/02/18 0800)  SpO2: (!) 93 % (07/02/18 0800) Vital Signs (24h Range):  Temp:  [97.3 °F (36.3 °C)-98.7 °F (37.1 °C)] 98.7 °F (37.1 °C)  Pulse:  [76-99] 99  Resp:  [18-22] 18  SpO2:  [88 %-95 %] 93 %  BP: (103-156)/(53-78) 122/65     Weight: 71.2 kg (156 lb 15.5 oz)  Body mass index is 27.81 kg/m².  Body surface area is 1.78 meters squared.      Intake/Output - Last 3 Shifts       06/30 0700 - 07/01 0659 07/01 0700 - 07/02 0659 07/02 0700 - 07/03 0659    P.O. 600 1240     I.V. (mL/kg) 2441 (34.3) 582.3 (8.2)     Blood 350      IV Piggyback 250 1000     Total Intake(mL/kg) 3641 (51.1) 2822.3 (39.6)     Urine (mL/kg/hr) 4500 (2.6) 5600 (3.3)     Total Output 4500 5600      Net -859 -2777.7             Urine Occurrence  1 x     Stool Occurrence 1 x 2 x 1 x          Physical Exam   Constitutional: She is oriented to person, place, and time. She appears well-developed and well-nourished. No distress.   HENT:   Mouth/Throat: Oropharynx is clear and moist. No oropharyngeal exudate or posterior oropharyngeal erythema.   Eyes: Conjunctivae are normal. Pupils are equal, round, and reactive to light. No scleral icterus.   Neck: Normal range of motion. Neck supple. No thyromegaly present.   Cardiovascular: Normal rate, regular rhythm, normal heart sounds and intact distal pulses.    Pulmonary/Chest: Effort normal and breath sounds normal. No respiratory distress.   Abdominal: Soft. Bowel  sounds are normal. She exhibits no distension. There is no splenomegaly or hepatomegaly. There is no tenderness.   Musculoskeletal: Normal range of motion. She exhibits no edema or tenderness.   Neurological: She is alert and oriented to person, place, and time. No cranial nerve deficit. Coordination normal.   Skin: No rash noted. No cyanosis. No pallor. Nails show no clubbing.   PICC intact R arm no redness or drainage  Mildly red papular rash to upper back, chest and legs--resolved   Psychiatric: She has a normal mood and affect. Thought content normal.   Vitals reviewed.      Significant Labs:   CBC:   Recent Labs  Lab 07/01/18  0322 07/02/18  0431   WBC 0.04* 0.01*   HGB 6.9* 8.2*   HCT 21.0* 24.2*   PLT 14* 8*   , CMP:   Recent Labs  Lab 06/30/18  1724 07/01/18  0322 07/01/18  1606 07/01/18  2353    140 139 138   K 3.4* 3.5 3.1* 3.4*   * 111* 106 106   CO2 23 23 25 24   * 94 102 100   BUN 3* 4* 4* 5*   CREATININE 0.6 0.5 0.5 0.5   CALCIUM 7.5* 7.2* 7.9* 7.5*   PROT 5.5* 5.2* 5.4*  --    ALBUMIN 2.5* 2.4* 2.5*  --    BILITOT 0.7 0.8 0.9  --    ALKPHOS 514* 437* 464*  --    AST 10 10 9*  --    ALT 7* 6* 7*  --    ANIONGAP 7* 6* 8 8   EGFRNONAA >60.0 >60.0 >60.0 >60.0   , Coagulation: No results for input(s): PT, INR, APTT in the last 48 hours., LDH: No results for input(s): LDHCSF, BFSOURCE in the last 48 hours. and Uric Acid No results for input(s): URICACID in the last 48 hours.    Diagnostic Results:  I have reviewed all pertinent imaging results/findings within the past 24 hours.

## 2018-07-02 NOTE — ASSESSMENT & PLAN NOTE
- completed olanzapine daily x 6 days  - prn Zofran and Ativan  - improved with scheduled Compazine IV q6hr  - no recent emesis  - continue to convert meds from IV to po

## 2018-07-02 NOTE — ASSESSMENT & PLAN NOTE
--Corrected calcium 8.7 today.  --patient refusing scheduled po calcium replacement due to nausea

## 2018-07-02 NOTE — ASSESSMENT & PLAN NOTE
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 Ordered routine labs in addition to FLT3, NGS, and AML FISH.  - lo res HLA typing on 5/26/18 and hi res on 5/27/18 done in anticipation of possible need for future transplant   - Pt with two daughters, two full sisters (in their mid 60s, one with brain aneurysm hx, other one without any medical issues), and one full brother (59 y/o healthy).  - ECHO ordered 5/25/18, EF 65%  - PICC line placed 5/25/18   - initially on hydrea, discontinued on 5/28.   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  - started on induction therapy on 5/29/18.  - Day 35 of 7+3.    - allopurinol stopped 6/11  - prophylactic antimicrobials: Acyclovir, Voriconazole  - will need CNS Prophylaxis given her elevated WBC (high risk) on admission after count recovery.  - NPM1 +, CEBPA (-), FLT3 (+).  On Midostaurin 50 mg PO BID until Day 14 (held starting 6/8 to 6/11). Stopped when FLAG JENN[facility]ER re-induction started. Restarted Midostaurin at 25 mg bid on day 8 of FLAG JENNIFER induction (6/22), increase to full dose 50 mg bid (6/26) as improvement in liver enzymes.  - day 14 bone marrow biopsy completed 6/11 showing persistent disease with 15% CD 34 positive blasts  - Day 18 of FLAG-JENNIFER, tolerating without difficulty except nausea/vomiting  - repeat 2D echo on 6/15 with preserved EF of 60%  - day 14 restaging bone marrow biopsy done 6/29 without complication, results pending

## 2018-07-03 PROBLEM — I42.7 CHEMOTHERAPY INDUCED CARDIOMYOPATHY: Status: ACTIVE | Noted: 2018-07-03

## 2018-07-03 PROBLEM — T45.1X5A CHEMOTHERAPY INDUCED CARDIOMYOPATHY: Status: ACTIVE | Noted: 2018-07-03

## 2018-07-03 LAB
ABO + RH BLD: NORMAL
ALBUMIN SERPL BCP-MCNC: 2.6 G/DL
ALBUMIN SERPL BCP-MCNC: 2.7 G/DL
ALP SERPL-CCNC: 374 U/L
ALP SERPL-CCNC: 387 U/L
ALT SERPL W/O P-5'-P-CCNC: 5 U/L
ALT SERPL W/O P-5'-P-CCNC: 6 U/L
ANION GAP SERPL CALC-SCNC: 7 MMOL/L
ANION GAP SERPL CALC-SCNC: 8 MMOL/L
ANISOCYTOSIS BLD QL SMEAR: SLIGHT
AST SERPL-CCNC: 7 U/L
AST SERPL-CCNC: 8 U/L
BASOPHILS # BLD AUTO: 0 K/UL
BASOPHILS NFR BLD: 0 %
BILIRUB SERPL-MCNC: 0.8 MG/DL
BILIRUB SERPL-MCNC: 0.9 MG/DL
BLD GP AB SCN CELLS X3 SERPL QL: NORMAL
BUN SERPL-MCNC: 6 MG/DL
BUN SERPL-MCNC: 6 MG/DL
CALCIUM SERPL-MCNC: 8.2 MG/DL
CALCIUM SERPL-MCNC: 8.3 MG/DL
CHLORIDE SERPL-SCNC: 105 MMOL/L
CHLORIDE SERPL-SCNC: 108 MMOL/L
CO2 SERPL-SCNC: 22 MMOL/L
CO2 SERPL-SCNC: 24 MMOL/L
CREAT SERPL-MCNC: 0.6 MG/DL
CREAT SERPL-MCNC: 0.6 MG/DL
DIFFERENTIAL METHOD: ABNORMAL
EOSINOPHIL # BLD AUTO: 0 K/UL
EOSINOPHIL NFR BLD: 0 %
ERYTHROCYTE [DISTWIDTH] IN BLOOD BY AUTOMATED COUNT: 13.6 %
EST. GFR  (AFRICAN AMERICAN): >60 ML/MIN/1.73 M^2
EST. GFR  (AFRICAN AMERICAN): >60 ML/MIN/1.73 M^2
EST. GFR  (NON AFRICAN AMERICAN): >60 ML/MIN/1.73 M^2
EST. GFR  (NON AFRICAN AMERICAN): >60 ML/MIN/1.73 M^2
GLUCOSE SERPL-MCNC: 100 MG/DL
GLUCOSE SERPL-MCNC: 103 MG/DL
HCT VFR BLD AUTO: 22.8 %
HGB BLD-MCNC: 7.8 G/DL
IMM GRANULOCYTES # BLD AUTO: 0 K/UL
IMM GRANULOCYTES NFR BLD AUTO: 0 %
LYMPHOCYTES # BLD AUTO: 0 K/UL
LYMPHOCYTES NFR BLD: 100 %
MAGNESIUM SERPL-MCNC: 1.6 MG/DL
MAGNESIUM SERPL-MCNC: 2 MG/DL
MCH RBC QN AUTO: 30.5 PG
MCHC RBC AUTO-ENTMCNC: 34.2 G/DL
MCV RBC AUTO: 89 FL
MONOCYTES # BLD AUTO: 0 K/UL
MONOCYTES NFR BLD: 0 %
NEUTROPHILS # BLD AUTO: 0 K/UL
NEUTROPHILS NFR BLD: 0 %
NRBC BLD-RTO: 0 /100 WBC
PHOSPHATE SERPL-MCNC: 2.6 MG/DL
PHOSPHATE SERPL-MCNC: 2.7 MG/DL
PLATELET # BLD AUTO: 33 K/UL
PLATELET BLD QL SMEAR: ABNORMAL
PMV BLD AUTO: 10.6 FL
POTASSIUM SERPL-SCNC: 3.7 MMOL/L
POTASSIUM SERPL-SCNC: 4.1 MMOL/L
PROT SERPL-MCNC: 5.6 G/DL
PROT SERPL-MCNC: 5.7 G/DL
RBC # BLD AUTO: 2.56 M/UL
SODIUM SERPL-SCNC: 135 MMOL/L
SODIUM SERPL-SCNC: 139 MMOL/L
WBC # BLD AUTO: 0.02 K/UL

## 2018-07-03 PROCEDURE — 99233 SBSQ HOSP IP/OBS HIGH 50: CPT | Mod: ,,, | Performed by: INTERNAL MEDICINE

## 2018-07-03 PROCEDURE — 80053 COMPREHEN METABOLIC PANEL: CPT

## 2018-07-03 PROCEDURE — 93010 ELECTROCARDIOGRAM REPORT: CPT | Mod: ,,, | Performed by: INTERNAL MEDICINE

## 2018-07-03 PROCEDURE — 20600001 HC STEP DOWN PRIVATE ROOM

## 2018-07-03 PROCEDURE — 63600175 PHARM REV CODE 636 W HCPCS: Performed by: NURSE PRACTITIONER

## 2018-07-03 PROCEDURE — 85025 COMPLETE CBC W/AUTO DIFF WBC: CPT

## 2018-07-03 PROCEDURE — 93005 ELECTROCARDIOGRAM TRACING: CPT

## 2018-07-03 PROCEDURE — 25000003 PHARM REV CODE 250: Performed by: INTERNAL MEDICINE

## 2018-07-03 PROCEDURE — 84100 ASSAY OF PHOSPHORUS: CPT

## 2018-07-03 PROCEDURE — 83735 ASSAY OF MAGNESIUM: CPT | Mod: 91

## 2018-07-03 PROCEDURE — 86850 RBC ANTIBODY SCREEN: CPT

## 2018-07-03 PROCEDURE — 99223 1ST HOSP IP/OBS HIGH 75: CPT | Mod: ,,, | Performed by: INTERNAL MEDICINE

## 2018-07-03 PROCEDURE — A4216 STERILE WATER/SALINE, 10 ML: HCPCS | Performed by: INTERNAL MEDICINE

## 2018-07-03 PROCEDURE — 25000003 PHARM REV CODE 250: Performed by: NURSE PRACTITIONER

## 2018-07-03 PROCEDURE — 36593 DECLOT VASCULAR DEVICE: CPT

## 2018-07-03 PROCEDURE — 63600175 PHARM REV CODE 636 W HCPCS: Performed by: INTERNAL MEDICINE

## 2018-07-03 PROCEDURE — 25000003 PHARM REV CODE 250: Performed by: HOSPITALIST

## 2018-07-03 PROCEDURE — 25000003 PHARM REV CODE 250: Performed by: STUDENT IN AN ORGANIZED HEALTH CARE EDUCATION/TRAINING PROGRAM

## 2018-07-03 RX ORDER — LORAZEPAM 2 MG/ML
0.5 INJECTION INTRAMUSCULAR EVERY 6 HOURS PRN
Status: DISCONTINUED | OUTPATIENT
Start: 2018-07-03 | End: 2018-07-13 | Stop reason: HOSPADM

## 2018-07-03 RX ORDER — CEFPODOXIME PROXETIL 200 MG/1
200 TABLET, FILM COATED ORAL EVERY 12 HOURS
Status: DISCONTINUED | OUTPATIENT
Start: 2018-07-03 | End: 2018-07-13

## 2018-07-03 RX ORDER — FUROSEMIDE 20 MG/1
20 TABLET ORAL DAILY
Status: DISCONTINUED | OUTPATIENT
Start: 2018-07-03 | End: 2018-07-13 | Stop reason: HOSPADM

## 2018-07-03 RX ORDER — LISINOPRIL 2.5 MG/1
5 TABLET ORAL DAILY
Status: DISCONTINUED | OUTPATIENT
Start: 2018-07-03 | End: 2018-07-13 | Stop reason: HOSPADM

## 2018-07-03 RX ADMIN — ACYCLOVIR SODIUM 200 MG: 50 INJECTION, SOLUTION INTRAVENOUS at 08:07

## 2018-07-03 RX ADMIN — PROCHLORPERAZINE EDISYLATE 10 MG: 5 INJECTION INTRAMUSCULAR; INTRAVENOUS at 11:07

## 2018-07-03 RX ADMIN — MAGNESIUM SULFATE IN WATER 2 G: 40 INJECTION, SOLUTION INTRAVENOUS at 08:07

## 2018-07-03 RX ADMIN — METOPROLOL SUCCINATE 25 MG: 25 TABLET, EXTENDED RELEASE ORAL at 02:07

## 2018-07-03 RX ADMIN — PROCHLORPERAZINE EDISYLATE 10 MG: 5 INJECTION INTRAMUSCULAR; INTRAVENOUS at 06:07

## 2018-07-03 RX ADMIN — CEFPODOXIME PROXETIL 200 MG: 200 TABLET, FILM COATED ORAL at 08:07

## 2018-07-03 RX ADMIN — ESCITALOPRAM 10 MG: 5 TABLET, FILM COATED ORAL at 09:07

## 2018-07-03 RX ADMIN — PROCHLORPERAZINE EDISYLATE 10 MG: 5 INJECTION INTRAMUSCULAR; INTRAVENOUS at 05:07

## 2018-07-03 RX ADMIN — Medication 10 ML: at 11:07

## 2018-07-03 RX ADMIN — SODIUM PHOSPHATE, MONOBASIC, MONOHYDRATE 15 MMOL: 276; 142 INJECTION, SOLUTION INTRAVENOUS at 06:07

## 2018-07-03 RX ADMIN — CIPROFLOXACIN 400 MG: 2 INJECTION, SOLUTION INTRAVENOUS at 09:07

## 2018-07-03 RX ADMIN — FUROSEMIDE 20 MG: 20 TABLET ORAL at 05:07

## 2018-07-03 RX ADMIN — FILGRASTIM 480 MCG: 480 INJECTION, SOLUTION INTRAVENOUS; SUBCUTANEOUS at 08:07

## 2018-07-03 RX ADMIN — ACYCLOVIR SODIUM 200 MG: 50 INJECTION, SOLUTION INTRAVENOUS at 09:07

## 2018-07-03 RX ADMIN — ALTEPLASE 2 MG: 2.2 INJECTION, POWDER, LYOPHILIZED, FOR SOLUTION INTRAVENOUS at 04:07

## 2018-07-03 RX ADMIN — METOPROLOL TARTRATE 25 MG: 25 TABLET, FILM COATED ORAL at 09:07

## 2018-07-03 RX ADMIN — ALPRAZOLAM 0.25 MG: 0.25 TABLET ORAL at 08:07

## 2018-07-03 RX ADMIN — PANTOPRAZOLE SODIUM 40 MG: 40 TABLET, DELAYED RELEASE ORAL at 09:07

## 2018-07-03 RX ADMIN — VORICONAZOLE 200 MG: 200 TABLET ORAL at 09:07

## 2018-07-03 RX ADMIN — ALPRAZOLAM 0.25 MG: 0.25 TABLET ORAL at 02:07

## 2018-07-03 RX ADMIN — LISINOPRIL 5 MG: 2.5 TABLET ORAL at 02:07

## 2018-07-03 RX ADMIN — SODIUM PHOSPHATE, MONOBASIC, MONOHYDRATE 15 MMOL: 276; 142 INJECTION, SOLUTION INTRAVENOUS at 11:07

## 2018-07-03 RX ADMIN — VITAMIN D, TAB 1000IU (100/BT) 5000 UNITS: 25 TAB at 09:07

## 2018-07-03 RX ADMIN — Medication 10 ML: at 06:07

## 2018-07-03 RX ADMIN — VORICONAZOLE 200 MG: 200 TABLET ORAL at 08:07

## 2018-07-03 NOTE — SUBJECTIVE & OBJECTIVE
Past Medical History:   Diagnosis Date    Hypertension        Past Surgical History:   Procedure Laterality Date    CHOLECYSTECTOMY      HYSTERECTOMY      TONSILLECTOMY         Review of patient's allergies indicates:   Allergen Reactions    Methotrexate analogues      Elevated Liver Enzyme    Bactrim [sulfamethoxazole-trimethoprim] Nausea And Vomiting and Rash       No current facility-administered medications on file prior to encounter.      No current outpatient prescriptions on file prior to encounter.     Family History     Problem Relation (Age of Onset)    Cancer Mother, Father        Social History Main Topics    Smoking status: Former Smoker     Packs/day: 0.50     Years: 10.00     Types: Cigarettes     Quit date: 3/25/2017    Smokeless tobacco: Not on file    Alcohol use 0.6 oz/week     1 Shots of liquor per week    Drug use: No    Sexual activity: Yes     Partners: Female     Review of Systems   Constitution: Negative for weight gain.   HENT: Negative.    Eyes: Negative.    Cardiovascular: Negative for chest pain, dyspnea on exertion, irregular heartbeat and palpitations.   Respiratory: Positive for shortness of breath (she thinks related to anxiety). Negative for cough and wheezing.    Hematologic/Lymphatic: Negative.    Skin: Negative.    Musculoskeletal: Negative.    Gastrointestinal: Negative.    Genitourinary: Negative.    Neurological: Negative.    Psychiatric/Behavioral: The patient is nervous/anxious.    Allergic/Immunologic: Negative.      Objective:     Vital Signs (Most Recent):  Temp: 98.1 °F (36.7 °C) (07/03/18 0720)  Pulse: 93 (07/03/18 0720)  Resp: 16 (07/03/18 0720)  BP: 110/64 (07/03/18 0720)  SpO2: (!) 94 % (07/03/18 0720) Vital Signs (24h Range):  Temp:  [97.5 °F (36.4 °C)-99 °F (37.2 °C)] 98.1 °F (36.7 °C)  Pulse:  [] 93  Resp:  [16-20] 16  SpO2:  [90 %-95 %] 94 %  BP: ()/(51-81) 110/64     Weight: 67.3 kg (148 lb 4.2 oz)  Body mass index is 26.26 kg/m².    SpO2:  (!) 94 %  O2 Device (Oxygen Therapy): room air      Intake/Output Summary (Last 24 hours) at 07/03/18 0827  Last data filed at 07/03/18 0500   Gross per 24 hour   Intake             2425 ml   Output             4200 ml   Net            -1775 ml       Lines/Drains/Airways     Peripherally Inserted Central Catheter Line                 PICC Double Lumen 05/25/18 1030 right basilic 38 days                Physical Exam   Constitutional: She is oriented to person, place, and time.   HENT:   Head: Normocephalic and atraumatic.   Eyes: Conjunctivae are normal. Pupils are equal, round, and reactive to light.   Neck: Normal range of motion. Neck supple.   Cardiovascular: Normal rate, regular rhythm and normal heart sounds.  Exam reveals no gallop and no friction rub.    No murmur heard.  Pulmonary/Chest: Effort normal and breath sounds normal. She has no wheezes.   Abdominal: Soft. Bowel sounds are normal. She exhibits no distension. There is no tenderness.   Musculoskeletal: Normal range of motion.   Neurological: She is alert and oriented to person, place, and time.   Skin: Skin is warm.       Significant Labs:   CMP   Recent Labs  Lab 07/02/18  0910 07/02/18  1532 07/03/18  0400    138 139   K 3.9 3.5 4.1    105 108   CO2 25 24 24    105 103   BUN 6* 5* 6*   CREATININE 0.6 0.6 0.6   CALCIUM 8.3* 8.8 8.3*   PROT 5.9* 5.8* 5.6*   ALBUMIN 2.7* 2.7* 2.6*   BILITOT 0.8 0.9 0.8   ALKPHOS 456* 442* 387*   AST 9* 9* 8*   ALT 7* 10 6*   ANIONGAP 6* 9 7*   ESTGFRAFRICA >60.0 >60.0 >60.0   EGFRNONAA >60.0 >60.0 >60.0   , CBC   Recent Labs  Lab 07/02/18  0431 07/03/18  0400   WBC 0.01* 0.02*   HGB 8.2* 7.8*   HCT 24.2* 22.8*   PLT 8* 33*    and Troponin No results for input(s): TROPONINI in the last 48 hours.    Significant Imaging: Echocardiogram:   2D echo with color flow doppler:   Results for orders placed or performed during the hospital encounter of 05/24/18   2D echo with color flow doppler   Result Value  Ref Range    EF 30 (A) 55 - 65    Mitral Valve Regurgitation MODERATE (A)     Diastolic Dysfunction Yes (A)     Aortic Valve Regurgitation TRIVIAL     Est. PA Systolic Pressure 51.16 (A)     Pericardial Effusion TRIVIAL     Mitral Valve Mobility NORMAL     Tricuspid Valve Regurgitation MILD

## 2018-07-03 NOTE — ASSESSMENT & PLAN NOTE
- secondary to chemotherapy.   - WBC 0.02 and ANC 0, on Neupogen per [facility]ENN[facility] protocol  - hgb 7.8 and platelet count 33,000 today  - tranfuse if Hb < 7 g/dL and Plt < 10,000/mm3 or active bleed.

## 2018-07-03 NOTE — ASSESSMENT & PLAN NOTE
- history of SVT and reports intermittent palpitations at times.  - EKG on 5/28 - NSR.  - increased metoprolol XL to 25 mg QD,she feels better with this dose.  - metoprolol tartrate changed on 6/13 to 25 mg bid (hold for SBP <100) due to hypotension  - cardiology consulted for abnormal echo and lopressor stopped and lisinopril and Toprol XL started

## 2018-07-03 NOTE — ASSESSMENT & PLAN NOTE
- completed olanzapine daily x 6 days  - prn Zofran and Ativan, will stop Zofran due to QTc prolongation  - improved with scheduled Compazine IV q6hr  - no recent emesis  - continue to convert meds from IV to po

## 2018-07-03 NOTE — PROGRESS NOTES
[facility]  Hematology  Bone Marrow Transplant  Progress Note    Patient Name: Noreen Mac  Admission Date: 5/24/2018  Hospital Length of Stay: 40 days  Code Status: Full Code    Subjective:     Interval History: Day 36 of 7+3 and Day 19 of FLAG JENNIFER. Restaging Bm bx with GABRIEL. Cardiology consulted for abnormal echo, EF 30% with pulmonary HTN and severe L atrial enlargement. EKG with T wave abnormality, possible anterolateral ischemia and prolonged QTc of 530. Medications adjusted. Nausea controlled, no diarrhea. Electrolytes improved. On O2 as needed. Complains of sob with exertion. No chest pain.    Objective:     Vital Signs (Most Recent):  Temp: 98.7 °F (37.1 °C) (07/03/18 1134)  Pulse: 78 (07/03/18 1134)  Resp: 14 (07/03/18 1134)  BP: (!) 105/53 (07/03/18 1134)  SpO2: 96 % (07/03/18 1134) Vital Signs (24h Range):  Temp:  [97.9 °F (36.6 °C)-99 °F (37.2 °C)] 98.7 °F (37.1 °C)  Pulse:  [] 78  Resp:  [14-20] 14  SpO2:  [90 %-96 %] 96 %  BP: ()/(51-81) 105/53     Weight: 67.3 kg (148 lb 4.2 oz)  Body mass index is 26.26 kg/m².  Body surface area is 1.73 meters squared.      Intake/Output - Last 3 Shifts       07/01 0700 - 07/02 0659 07/02 0700 - 07/03 0659 07/03 0700 - 07/04 0659    P.O. 1240 1100 240    I.V. (mL/kg) 582.3 (8.2) 50 (0.7)     Blood  275     IV Piggyback 1000 1000 500    Total Intake(mL/kg) 2822.3 (39.6) 2425 (36.1) 740 (11)    Urine (mL/kg/hr) 5600 (3.3) 4200 (2.6) 600 (1.4)    Total Output 5600 4200 600    Net -2777.7 -1775 +140           Urine Occurrence 1 x      Stool Occurrence 2 x 1 x 1 x          Physical Exam   Constitutional: She is oriented to person, place, and time. She appears well-developed and well-nourished. No distress.   HENT:   Mouth/Throat: Oropharynx is clear and moist. No oropharyngeal exudate or posterior oropharyngeal erythema.   Eyes: Conjunctivae are normal. Pupils are equal, round, and reactive to light. No scleral icterus.   Neck: Normal  range of motion. Neck supple. No thyromegaly present.   Cardiovascular: Normal rate, regular rhythm, normal heart sounds and intact distal pulses.    Pulmonary/Chest: Effort normal and breath sounds normal. No respiratory distress.   Abdominal: Soft. Bowel sounds are normal. She exhibits no distension. There is no splenomegaly or hepatomegaly. There is no tenderness.   Musculoskeletal: Normal range of motion. She exhibits no edema or tenderness.   Neurological: She is alert and oriented to person, place, and time. No cranial nerve deficit. Coordination normal.   Skin: No rash noted. No cyanosis. No pallor. Nails show no clubbing.   PICC intact R arm no redness or drainage     Psychiatric: She has a normal mood and affect. Thought content normal.   Vitals reviewed.      Significant Labs:   CBC:   Recent Labs  Lab 07/02/18  0431 07/03/18  0400   WBC 0.01* 0.02*   HGB 8.2* 7.8*   HCT 24.2* 22.8*   PLT 8* 33*    and CMP:   Recent Labs  Lab 07/02/18  0910 07/02/18  1532 07/03/18  0400    138 139   K 3.9 3.5 4.1    105 108   CO2 25 24 24    105 103   BUN 6* 5* 6*   CREATININE 0.6 0.6 0.6   CALCIUM 8.3* 8.8 8.3*   PROT 5.9* 5.8* 5.6*   ALBUMIN 2.7* 2.7* 2.6*   BILITOT 0.8 0.9 0.8   ALKPHOS 456* 442* 387*   AST 9* 9* 8*   ALT 7* 10 6*   ANIONGAP 6* 9 7*   EGFRNONAA >60.0 >60.0 >60.0       Diagnostic Results:  I have reviewed all pertinent imaging results/findings within the past 24 hours.    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 Ordered routine labs in addition to FLT3, NGS, and AML FISH.  - lo res HLA typing on 5/26/18 and hi res on 5/27/18 done in anticipation of possible need for future transplant   - Pt with two daughters, two full sisters (in their mid 60s, one with  brain aneurysm hx, other one without any medical issues), and one full brother (59 y/o healthy).  - ECHO ordered 5/25/18, EF 65%  - PICC line placed 5/25/18   - initially on hydrea, discontinued on 5/28.   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  - started on induction therapy on 5/29/18.  - Day 36 of 7+3.    - allopurinol stopped 6/11  - prophylactic antimicrobials: Acyclovir, Voriconazole  - will need CNS Prophylaxis given her elevated WBC (high risk) on admission after count recovery.  - NPM1 +, CEBPA (-), FLT3 (+).  On Midostaurin 50 mg PO BID until Day 14 (held starting 6/8 to 6/11). Stopped when FLAG JENNIFER re-induction started. Restarted Midostaurin at 25 mg bid on day 8 of FLAG JENNIFER induction (6/22), increase to full dose 50 mg bid (6/26) as improvement in liver enzymes. Will stop today due to abnormal cardiac echo.  - day 14 bone marrow biopsy completed 6/11 showing persistent disease with 15% CD 34 positive blasts  - Day 18 of FLAG-JENNIFER, tolerating without difficulty except nausea/vomiting  - repeat 2D echo on 6/15 with preserved EF of 60%  - day 14 restaging bone marrow biopsy done 6/29 without complication, results with no evidence of AML          Staphylococcus epidermidis bacteremia    - blood cultures from 6/11 at 0430 with gram + cocci, staph epidermis sensitive for Vancomycin, CXR and UA unremarkable.   - surveillance blood cultures repeated 6/13 NGTD  - de-escalated aztreonam back to prophylactic cipro 6/16  - ID consulted on 6/19, rec continue Vanc for 2 weeks post last negative culture (EOT 6/27)  - BC from 6/11 with gram negative rods, leptotrichia goodfellowii  - completed Flagyl po x 5 days (EOT 6/24)  - ID has signed off  - patient remains afebrile        Pancytopenia    - secondary to chemotherapy.   - WBC 0.02 and ANC 0, on Neupogen per FLAG JENNIFER protocol  - hgb 7.8 and platelet count 33,000 today  - tranfuse if Hb < 7 g/dL and Plt < 10,000/mm3 or active bleed.                 Abnormal liver enzymes    AST and ALT have normalized. Alk phos improved to 387 today.   -   - midostaurin started day 8 at 25 mg bid, monitoring liver enzymes closely and improved. Increasing Midostaurin to 50 mg bid 6/26. Will stop Midostaurin today (7/3) due to abnormal cardiac echo.  - 6/22/18 liver US showing mild intrahepatic dilation, otherwise impression of hepatic steatosis.  Will hold off of MRCP at this time.          Chemotherapy induced cardiomyopathy    - sob and O2 requirements  - chest x-ray with pulmonary edema  - 2 D echo from 7/2 with EF of 30-35%, pulmonary HTN and severe L atrial enlargement  - cardiology consulted, appreciate recs  - Lopressor dc'd and lisinopril and Toprol XL started  - cardiology follow-up as outpatient in 3 months        Hypophosphatemia    - phos wnl at 2.7 this am  - replace IV per electrolyte protocol        Hypokalemia    - K at 4.1 today, replaced with 20 meq IV potassium overnight  - patient refusing oral potassium replacement due to nausea          CINV (chemotherapy-induced nausea and vomiting)    - completed olanzapine daily x 6 days  - prn Zofran and Ativan, will stop Zofran due to QTc prolongation  - improved with scheduled Compazine IV q6hr  - no recent emesis  - continue to convert meds from IV to po        Hypocalcemia    --Corrected calcium 9.4 today.  --patient refusing scheduled po calcium replacement due to nausea            Hypomagnesemia    - Mag 2.0 today  - replace IV per electrolyte protocol          MCTD (mixed connective tissue disease)    - MCTD-NOS  - previously on MTX and Prednisone.   - off therapy now and otherwise doing well.   - can follow up outpatient with her Rheumatologist upon discharge.         Pulmonary nodule    - CXR suggestive of 7 mm slightly irregular shaped radiodensity projected over the left upper lung zone which could represent a calcified granuloma but remains indeterminate.  - CT Chest on 5/28: Multiple scattered  noncalcified pulmonary granulomas, the largest measuring 8 mm in the lateral segment of the right middle lobe.   - repeat non-contrast chest CT at 6-12 months         Insomnia    - PRN nightly Xanax.         HTN (hypertension)    - history of SVT and reports intermittent palpitations at times.  - EKG on 5/28 - NSR.  - increased metoprolol XL to 25 mg QD,she feels better with this dose.  - metoprolol tartrate changed on 6/13 to 25 mg bid (hold for SBP <100) due to hypotension  - cardiology consulted for abnormal echo and lopressor stopped and lisinopril and Toprol XL started        Osteoporosis    - takes prolia once every 6 months, last given mid April 2018   - no acute issue         Depression    - no acute issue   - continue home Escitalopram             VTE Risk Mitigation         Ordered     heparin, porcine (PF) 100 unit/mL injection flush 300 Units  As needed (PRN)      06/15/18 1657     Place MAYLIN hose  Until discontinued      05/25/18 0032     IP VTE HIGH RISK PATIENT  Once      05/25/18 0032          Disposition: continue inpatient supportive care until count recovery    Sweetie Lepe NP  Bone Marrow Transplant  [facility]

## 2018-07-03 NOTE — ASSESSMENT & PLAN NOTE
Based off of therapy timeline and most recent echo changes of LV systolic dysfunction and EF reduction on 7/2 as compared to a baseline echo from 6/15, the most likely diagnosis is chemo-induced cardiomyopathy.   FLAG JENNIFER is on day 18 (start date 6/15) and a known risk of idrarubicin is cardiomyopathy.  Patient was fluid overloaded over the weekend with a CXR that suggested pulmonary edema, indicating likely an issue with cardiac output making cardiomyopathy at the top of the differential.  Also on the differential is acute heart failure with reduced ejection fraction.     Start lisinopril 5mg PO qDaily, toprol 25mg PO qDaily    Discontinue lopressor 25mg PO BID    Will need cardiology followup outpatient to monitor condition

## 2018-07-03 NOTE — PT/OT/SLP PROGRESS
Physical Therapy      Patient Name:  Noreen Mac   MRN:  [id number]    PT to bedside for follow-up visit. Pt found supine with family member at bedside. Pt denied acute PT needs/concerns at this time. Pt reports that she has been ambulating daily with family assist without significant declines in mobility. Pt instructed to contact team if therapy needs arise prior to next PT visit. Pt v/u. Will continue to follow. No billable units this date.     Adrianne Jerez, PT, DPT   7/3/2018  [phone]

## 2018-07-03 NOTE — ASSESSMENT & PLAN NOTE
- Admitted from [facility] on 5/25/18 early AM with WBC around 100s, for 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 Ordered routine labs in addition to FLT3, NGS, and AML FISH.  - lo res HLA typing on 5/26/18 and hi res on 5/27/18 done in anticipation of possible need for future transplant   - Pt with two daughters, two full sisters (in their mid 60s, one with brain aneurysm hx, other one without any medical issues), and one full brother (61 y/o healthy).  - ECHO ordered 5/25/18, EF 65%  - PICC line placed 5/25/18   - initially on hydrea, discontinued on 5/28.   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  - started on induction therapy on 5/29/18.  - Day 36 of 7+3.    - allopurinol stopped 6/11  - prophylactic antimicrobials: Acyclovir, Voriconazole  - will need CNS Prophylaxis given her elevated WBC (high risk) on admission after count recovery.  - NPM1 +, CEBPA (-), FLT3 (+).  On Midostaurin 50 mg PO BID until Day 14 (held starting 6/8 to 6/11). Stopped when FLAG JENNIFER re-induction started. Restarted Midostaurin at 25 mg bid on day 8 of FLAG JENNIFER induction (6/22), increase to full dose 50 mg bid (6/26) as improvement in liver enzymes. Will stop today due to abnormal cardiac echo.  - day 14 bone marrow biopsy completed 6/11 showing persistent disease with 15% CD 34 positive blasts  - Day 18 of FLAG-JENNIFER, tolerating without difficulty except nausea/vomiting  - repeat 2D echo on 6/15 with preserved EF of 60%  - day 14 restaging bone marrow biopsy done 6/29 without complication, results with no evidence of AML

## 2018-07-03 NOTE — ASSESSMENT & PLAN NOTE
AST and ALT have normalized. Alk phos improved to 387 today.   -   - midostaurin started day 8 at 25 mg bid, monitoring liver enzymes closely and improved. Increasing Midostaurin to 50 mg bid 6/26. Will stop Midostaurin today (7/3) due to abnormal cardiac echo.  - 6/22/18 liver US showing mild intrahepatic dilation, otherwise impression of hepatic steatosis.  Will hold off of MRCP at this time.

## 2018-07-03 NOTE — ASSESSMENT & PLAN NOTE
- K at 4.1 today, replaced with 20 meq IV potassium overnight  - patient refusing oral potassium replacement due to nausea

## 2018-07-03 NOTE — HPI
Ms Mac is a 67 y/o female that presented with concern for acute leukemia.  She was diagnosed with non-M3 AML and is currently on day 36 of of 7+3 and Day 19 of FLAG JENNIFER.  Over the weekend she became short of breath and had an O2% of 88%.  CXR showed pulmonary edema.  2D echo on 7/2 showed that she had new reduced EF of 30% (previous echo on 6/15/18 showed EF of 60%).  This morning she was resting comfortably.  She is currently not feeling short of breath.  No report of chest pain, palpitations, lightheadedness, or dizziness.

## 2018-07-03 NOTE — CONSULTS
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     Inpatient consult to Cardiology  Consult performed by: GILMAR OCONNOR  Consult ordered by: SHARMIN PEREZ  Reason for consult: reduced EF  Assessment/Recommendations: Start lisinopril 5mg PO qDaily, toprol 25mg PO qDaily    Discontinue lopressor 25mg PO BID    Will need cardiology followup outpatient to monitor condition        Subjective:     Chief Complaint:  Shortness of breath     HPI:   Ms Mac is a 67 y/o female that presented with concern for acute leukemia.  She was diagnosed with non-M3 AML and is currently on day 36 of of 7+3 and Day 19 of FLAG JENNIFER.  Over the weekend she became short of breath and had an O2% of 88%.  CXR showed pulmonary edema.  2D echo on 7/2 showed that she had new reduced EF of 30% (previous echo on 6/15/18 showed EF of 60%).  This morning she was resting comfortably.  She is currently not feeling short of breath.  No report of chest pain, palpitations, lightheadedness, or dizziness.      Past Medical History:   Diagnosis Date    Hypertension        Past Surgical History:   Procedure Laterality Date    CHOLECYSTECTOMY      HYSTERECTOMY      TONSILLECTOMY         Review of patient's allergies indicates:   Allergen Reactions    Methotrexate analogues      Elevated Liver Enzyme    Bactrim [sulfamethoxazole-trimethoprim] Nausea And Vomiting and Rash       No current facility-administered medications on file prior to encounter.      No current outpatient prescriptions on file prior to encounter.      Family History     Problem Relation (Age of Onset)    Cancer Mother, Father        Social History Main Topics    Smoking status: Former Smoker     Packs/day: 0.50     Years: 10.00     Types: Cigarettes     Quit date: 3/25/2017    Smokeless tobacco: Not on file    Alcohol use 0.6 oz/week     1 Shots of liquor per week    Drug use: No    Sexual activity: Yes     Partners: Female     Review of Systems   Constitution: Negative for weight gain.   HENT: Negative.    Eyes: Negative.    Cardiovascular: Negative for chest pain, dyspnea on exertion, irregular heartbeat and palpitations.   Respiratory: Positive for shortness of breath (she thinks related to anxiety). Negative for cough and wheezing.    Hematologic/Lymphatic: Negative.    Skin: Negative.    Musculoskeletal: Negative.    Gastrointestinal: Negative.    Genitourinary: Negative.    Neurological: Negative.    Psychiatric/Behavioral: The patient is nervous/anxious.    Allergic/Immunologic: Negative.      Objective:     Vital Signs (Most Recent):  Temp: 98.1 °F (36.7 °C) (07/03/18 0720)  Pulse: 93 (07/03/18 0720)  Resp: 16 (07/03/18 0720)  BP: 110/64 (07/03/18 0720)  SpO2: (!) 94 % (07/03/18 0720) Vital Signs (24h Range):  Temp:  [97.5 °F (36.4 °C)-99 °F (37.2 °C)] 98.1 °F (36.7 °C)  Pulse:  [] 93  Resp:  [16-20] 16  SpO2:  [90 %-95 %] 94 %  BP: ()/(51-81) 110/64     Weight: 67.3 kg (148 lb 4.2 oz)  Body mass index is 26.26 kg/m².    SpO2: (!) 94 %  O2 Device (Oxygen Therapy): room air      Intake/Output Summary (Last 24 hours) at 07/03/18 0827  Last data filed at 07/03/18 0500   Gross per 24 hour   Intake             2425 ml   Output             4200 ml   Net            -1775 ml       Lines/Drains/Airways     Peripherally Inserted Central Catheter Line                 PICC Double Lumen 05/25/18 1030 right basilic 38 days                Physical Exam   Constitutional: She is oriented to person, place, and time.   HENT:   Head: Normocephalic and  atraumatic.   Eyes: Conjunctivae are normal. Pupils are equal, round, and reactive to light.   Neck: Normal range of motion. Neck supple.   Cardiovascular: Normal rate, regular rhythm and normal heart sounds.  Exam reveals no gallop and no friction rub.    No murmur heard.  Pulmonary/Chest: Effort normal and breath sounds normal. She has no wheezes.   Abdominal: Soft. Bowel sounds are normal. She exhibits no distension. There is no tenderness.   Musculoskeletal: Normal range of motion.   Neurological: She is alert and oriented to person, place, and time.   Skin: Skin is warm.       Significant Labs:   CMP   Recent Labs  Lab 07/02/18  0910 07/02/18  1532 07/03/18  0400    138 139   K 3.9 3.5 4.1    105 108   CO2 25 24 24    105 103   BUN 6* 5* 6*   CREATININE 0.6 0.6 0.6   CALCIUM 8.3* 8.8 8.3*   PROT 5.9* 5.8* 5.6*   ALBUMIN 2.7* 2.7* 2.6*   BILITOT 0.8 0.9 0.8   ALKPHOS 456* 442* 387*   AST 9* 9* 8*   ALT 7* 10 6*   ANIONGAP 6* 9 7*   ESTGFRAFRICA >60.0 >60.0 >60.0   EGFRNONAA >60.0 >60.0 >60.0   , CBC   Recent Labs  Lab 07/02/18  0431 07/03/18  0400   WBC 0.01* 0.02*   HGB 8.2* 7.8*   HCT 24.2* 22.8*   PLT 8* 33*    and Troponin No results for input(s): TROPONINI in the last 48 hours.    Significant Imaging: Echocardiogram:   2D echo with color flow doppler:   Results for orders placed or performed during the hospital encounter of 05/24/18   2D echo with color flow doppler   Result Value Ref Range    EF 30 (A) 55 - 65    Mitral Valve Regurgitation MODERATE (A)     Diastolic Dysfunction Yes (A)     Aortic Valve Regurgitation TRIVIAL     Est. PA Systolic Pressure 51.16 (A)     Pericardial Effusion TRIVIAL     Mitral Valve Mobility NORMAL     Tricuspid Valve Regurgitation MILD      Assessment and Plan:     Chemotherapy induced cardiomyopathy    Based off of therapy timeline and most recent echo changes of LV systolic dysfunction and EF reduction on 7/2 as compared to a baseline echo from 6/15, the  most likely diagnosis is chemo-induced cardiomyopathy.   FLAG JENNIFER is on day 18 (start date 6/15) and a known risk of idrarubicin is cardiomyopathy.  Patient was fluid overloaded over the weekend with a CXR that suggested pulmonary edema, indicating likely an issue with cardiac output making cardiomyopathy at the top of the differential.  Also on the differential is acute heart failure with reduced ejection fraction.     Start lisinopril 5mg PO qDaily, toprol 25mg PO qDaily    Discontinue lopressor 25mg PO BID    Will need cardiology followup outpatient to monitor condition            VTE Risk Mitigation         Ordered     heparin, porcine (PF) 100 unit/mL injection flush 300 Units  As needed (PRN)      06/15/18 1657     Place MAYLIN hose  Until discontinued      05/25/18 0032     IP VTE HIGH RISK PATIENT  Once      05/25/18 0032          Thank you for your consult. I will follow-up with patient. Please contact us if you have any additional questions.    Main Oconnor MD  PGY-1  Cardiology   [facility]

## 2018-07-03 NOTE — SUBJECTIVE & OBJECTIVE
Subjective:     Interval History: Day 36 of 7+3 and Day 19 of FLAG JENNIFER. Restaging Bm bx with GABRIEL. Cardiology consulted for abnormal echo, EF 30% with pulmonary HTN and severe L atrial enlargement. EKG with T wave abnormality, possible anterolateral ischemia and prolonged QTc of 530. Medications adjusted. Nausea controlled, no diarrhea. Electrolytes improved. On O2 as needed. Complains of sob with exertion. No chest pain.    Objective:     Vital Signs (Most Recent):  Temp: 98.7 °F (37.1 °C) (07/03/18 1134)  Pulse: 78 (07/03/18 1134)  Resp: 14 (07/03/18 1134)  BP: (!) 105/53 (07/03/18 1134)  SpO2: 96 % (07/03/18 1134) Vital Signs (24h Range):  Temp:  [97.9 °F (36.6 °C)-99 °F (37.2 °C)] 98.7 °F (37.1 °C)  Pulse:  [] 78  Resp:  [14-20] 14  SpO2:  [90 %-96 %] 96 %  BP: ()/(51-81) 105/53     Weight: 67.3 kg (148 lb 4.2 oz)  Body mass index is 26.26 kg/m².  Body surface area is 1.73 meters squared.      Intake/Output - Last 3 Shifts       07/01 0700 - 07/02 0659 07/02 0700 - 07/03 0659 07/03 0700 - 07/04 0659    P.O. 1240 1100 240    I.V. (mL/kg) 582.3 (8.2) 50 (0.7)     Blood  275     IV Piggyback 1000 1000 500    Total Intake(mL/kg) 2822.3 (39.6) 2425 (36.1) 740 (11)    Urine (mL/kg/hr) 5600 (3.3) 4200 (2.6) 600 (1.4)    Total Output 5600 4200 600    Net -2777.7 -1775 +140           Urine Occurrence 1 x      Stool Occurrence 2 x 1 x 1 x          Physical Exam   Constitutional: She is oriented to person, place, and time. She appears well-developed and well-nourished. No distress.   HENT:   Mouth/Throat: Oropharynx is clear and moist. No oropharyngeal exudate or posterior oropharyngeal erythema.   Eyes: Conjunctivae are normal. Pupils are equal, round, and reactive to light. No scleral icterus.   Neck: Normal range of motion. Neck supple. No thyromegaly present.   Cardiovascular: Normal rate, regular rhythm, normal heart sounds and intact distal pulses.    Pulmonary/Chest: Effort normal and breath sounds  normal. No respiratory distress.   Abdominal: Soft. Bowel sounds are normal. She exhibits no distension. There is no splenomegaly or hepatomegaly. There is no tenderness.   Musculoskeletal: Normal range of motion. She exhibits no edema or tenderness.   Neurological: She is alert and oriented to person, place, and time. No cranial nerve deficit. Coordination normal.   Skin: No rash noted. No cyanosis. No pallor. Nails show no clubbing.   PICC intact R arm no redness or drainage     Psychiatric: She has a normal mood and affect. Thought content normal.   Vitals reviewed.      Significant Labs:   CBC:   Recent Labs  Lab 07/02/18  0431 07/03/18  0400   WBC 0.01* 0.02*   HGB 8.2* 7.8*   HCT 24.2* 22.8*   PLT 8* 33*    and CMP:   Recent Labs  Lab 07/02/18  0910 07/02/18  1532 07/03/18  0400    138 139   K 3.9 3.5 4.1    105 108   CO2 25 24 24    105 103   BUN 6* 5* 6*   CREATININE 0.6 0.6 0.6   CALCIUM 8.3* 8.8 8.3*   PROT 5.9* 5.8* 5.6*   ALBUMIN 2.7* 2.7* 2.6*   BILITOT 0.8 0.9 0.8   ALKPHOS 456* 442* 387*   AST 9* 9* 8*   ALT 7* 10 6*   ANIONGAP 6* 9 7*   EGFRNONAA >60.0 >60.0 >60.0       Diagnostic Results:  I have reviewed all pertinent imaging results/findings within the past 24 hours.

## 2018-07-03 NOTE — PLAN OF CARE
Problem: Patient Care Overview  Goal: Plan of Care Review  Outcome: Ongoing (interventions implemented as appropriate)  Patient ambulating in halls with daughter. Neutropenic precautions in use. Patient reports slight anxiety at this time. See MAR for medication management. Patient reports having soft BMs. Voiding. No skin breakdown, no falls this shift. Afebrile. Electrolyte replacement given this early morning. Will recheck this afternoon. Will cont to monitor.

## 2018-07-04 LAB
ALBUMIN SERPL BCP-MCNC: 2.6 G/DL
ALBUMIN SERPL BCP-MCNC: 2.8 G/DL
ALP SERPL-CCNC: 348 U/L
ALP SERPL-CCNC: 366 U/L
ALT SERPL W/O P-5'-P-CCNC: 5 U/L
ALT SERPL W/O P-5'-P-CCNC: 5 U/L
ANION GAP SERPL CALC-SCNC: 6 MMOL/L
ANION GAP SERPL CALC-SCNC: 8 MMOL/L
ANISOCYTOSIS BLD QL SMEAR: SLIGHT
AST SERPL-CCNC: 8 U/L
AST SERPL-CCNC: 8 U/L
BASOPHILS # BLD AUTO: ABNORMAL K/UL
BASOPHILS NFR BLD: 0 %
BILIRUB SERPL-MCNC: 0.8 MG/DL
BILIRUB SERPL-MCNC: 0.8 MG/DL
BLD PROD TYP BPU: NORMAL
BLOOD UNIT EXPIRATION DATE: NORMAL
BLOOD UNIT TYPE CODE: 6200
BLOOD UNIT TYPE: NORMAL
BUN SERPL-MCNC: 6 MG/DL
BUN SERPL-MCNC: 8 MG/DL
CALCIUM SERPL-MCNC: 8.1 MG/DL
CALCIUM SERPL-MCNC: 8.8 MG/DL
CHLORIDE SERPL-SCNC: 106 MMOL/L
CHLORIDE SERPL-SCNC: 106 MMOL/L
CO2 SERPL-SCNC: 24 MMOL/L
CO2 SERPL-SCNC: 24 MMOL/L
CODING SYSTEM: NORMAL
CREAT SERPL-MCNC: 0.6 MG/DL
CREAT SERPL-MCNC: 0.6 MG/DL
DIFFERENTIAL METHOD: ABNORMAL
DISPENSE STATUS: NORMAL
EOSINOPHIL # BLD AUTO: ABNORMAL K/UL
EOSINOPHIL NFR BLD: 0 %
ERYTHROCYTE [DISTWIDTH] IN BLOOD BY AUTOMATED COUNT: 13.4 %
EST. GFR  (AFRICAN AMERICAN): >60 ML/MIN/1.73 M^2
EST. GFR  (AFRICAN AMERICAN): >60 ML/MIN/1.73 M^2
EST. GFR  (NON AFRICAN AMERICAN): >60 ML/MIN/1.73 M^2
EST. GFR  (NON AFRICAN AMERICAN): >60 ML/MIN/1.73 M^2
GLUCOSE SERPL-MCNC: 100 MG/DL
GLUCOSE SERPL-MCNC: 103 MG/DL
HCT VFR BLD AUTO: 22.5 %
HGB BLD-MCNC: 7.6 G/DL
IMM GRANULOCYTES # BLD AUTO: ABNORMAL K/UL
IMM GRANULOCYTES NFR BLD AUTO: ABNORMAL %
LYMPHOCYTES # BLD AUTO: ABNORMAL K/UL
LYMPHOCYTES NFR BLD: 100 %
MAGNESIUM SERPL-MCNC: 1.8 MG/DL
MAGNESIUM SERPL-MCNC: 1.9 MG/DL
MCH RBC QN AUTO: 30.3 PG
MCHC RBC AUTO-ENTMCNC: 33.8 G/DL
MCV RBC AUTO: 90 FL
MONOCYTES # BLD AUTO: ABNORMAL K/UL
MONOCYTES NFR BLD: 0 %
NEUTROPHILS NFR BLD: 0 %
NRBC BLD-RTO: 0 /100 WBC
NUM UNITS TRANS PACKED RBC: NORMAL
PHOSPHATE SERPL-MCNC: 2.9 MG/DL
PHOSPHATE SERPL-MCNC: 3.6 MG/DL
PLATELET # BLD AUTO: 18 K/UL
PLATELET BLD QL SMEAR: ABNORMAL
PMV BLD AUTO: 10.3 FL
POTASSIUM SERPL-SCNC: 3.4 MMOL/L
POTASSIUM SERPL-SCNC: 4.3 MMOL/L
PROT SERPL-MCNC: 5.6 G/DL
PROT SERPL-MCNC: 6.1 G/DL
RBC # BLD AUTO: 2.51 M/UL
SODIUM SERPL-SCNC: 136 MMOL/L
SODIUM SERPL-SCNC: 138 MMOL/L
WBC # BLD AUTO: 0.02 K/UL

## 2018-07-04 PROCEDURE — 85027 COMPLETE CBC AUTOMATED: CPT

## 2018-07-04 PROCEDURE — 99233 SBSQ HOSP IP/OBS HIGH 50: CPT | Mod: ,,, | Performed by: INTERNAL MEDICINE

## 2018-07-04 PROCEDURE — 25000003 PHARM REV CODE 250: Performed by: INTERNAL MEDICINE

## 2018-07-04 PROCEDURE — 25000003 PHARM REV CODE 250: Performed by: NURSE PRACTITIONER

## 2018-07-04 PROCEDURE — 63600175 PHARM REV CODE 636 W HCPCS: Performed by: INTERNAL MEDICINE

## 2018-07-04 PROCEDURE — 25000003 PHARM REV CODE 250: Performed by: STUDENT IN AN ORGANIZED HEALTH CARE EDUCATION/TRAINING PROGRAM

## 2018-07-04 PROCEDURE — 84100 ASSAY OF PHOSPHORUS: CPT | Mod: 91

## 2018-07-04 PROCEDURE — 80053 COMPREHEN METABOLIC PANEL: CPT | Mod: 91

## 2018-07-04 PROCEDURE — 63600175 PHARM REV CODE 636 W HCPCS: Performed by: STUDENT IN AN ORGANIZED HEALTH CARE EDUCATION/TRAINING PROGRAM

## 2018-07-04 PROCEDURE — 83735 ASSAY OF MAGNESIUM: CPT | Mod: 91

## 2018-07-04 PROCEDURE — 63600175 PHARM REV CODE 636 W HCPCS: Mod: JG | Performed by: INTERNAL MEDICINE

## 2018-07-04 PROCEDURE — A4216 STERILE WATER/SALINE, 10 ML: HCPCS | Performed by: INTERNAL MEDICINE

## 2018-07-04 PROCEDURE — 25000003 PHARM REV CODE 250: Performed by: HOSPITALIST

## 2018-07-04 PROCEDURE — 63600175 PHARM REV CODE 636 W HCPCS: Performed by: NURSE PRACTITIONER

## 2018-07-04 PROCEDURE — 20600001 HC STEP DOWN PRIVATE ROOM

## 2018-07-04 PROCEDURE — 85007 BL SMEAR W/DIFF WBC COUNT: CPT

## 2018-07-04 RX ORDER — POTASSIUM CHLORIDE 20 MEQ/1
40 TABLET, EXTENDED RELEASE ORAL EVERY 4 HOURS
Status: DISCONTINUED | OUTPATIENT
Start: 2018-07-04 | End: 2018-07-04

## 2018-07-04 RX ORDER — POTASSIUM CHLORIDE 7.45 MG/ML
10 INJECTION INTRAVENOUS
Status: COMPLETED | OUTPATIENT
Start: 2018-07-04 | End: 2018-07-04

## 2018-07-04 RX ADMIN — VORICONAZOLE 200 MG: 200 TABLET ORAL at 08:07

## 2018-07-04 RX ADMIN — Medication 10 ML: at 11:07

## 2018-07-04 RX ADMIN — ACYCLOVIR SODIUM 200 MG: 50 INJECTION, SOLUTION INTRAVENOUS at 08:07

## 2018-07-04 RX ADMIN — PROCHLORPERAZINE EDISYLATE 10 MG: 5 INJECTION INTRAMUSCULAR; INTRAVENOUS at 06:07

## 2018-07-04 RX ADMIN — FUROSEMIDE 20 MG: 20 TABLET ORAL at 09:07

## 2018-07-04 RX ADMIN — FILGRASTIM 480 MCG: 480 INJECTION, SOLUTION INTRAVENOUS; SUBCUTANEOUS at 08:07

## 2018-07-04 RX ADMIN — CEFPODOXIME PROXETIL 200 MG: 200 TABLET, FILM COATED ORAL at 09:07

## 2018-07-04 RX ADMIN — VORICONAZOLE 200 MG: 200 TABLET ORAL at 09:07

## 2018-07-04 RX ADMIN — POTASSIUM CHLORIDE 10 MEQ: 10 INJECTION, SOLUTION INTRAVENOUS at 12:07

## 2018-07-04 RX ADMIN — MAGNESIUM SULFATE IN WATER 2 G: 40 INJECTION, SOLUTION INTRAVENOUS at 08:07

## 2018-07-04 RX ADMIN — ALPRAZOLAM 0.25 MG: 0.25 TABLET ORAL at 12:07

## 2018-07-04 RX ADMIN — LORAZEPAM 0.5 MG: 2 INJECTION INTRAMUSCULAR; INTRAVENOUS at 02:07

## 2018-07-04 RX ADMIN — ESCITALOPRAM 10 MG: 5 TABLET, FILM COATED ORAL at 09:07

## 2018-07-04 RX ADMIN — PROCHLORPERAZINE EDISYLATE 10 MG: 5 INJECTION INTRAMUSCULAR; INTRAVENOUS at 11:07

## 2018-07-04 RX ADMIN — ALPRAZOLAM 0.25 MG: 0.25 TABLET ORAL at 08:07

## 2018-07-04 RX ADMIN — POTASSIUM CHLORIDE 10 MEQ: 10 INJECTION, SOLUTION INTRAVENOUS at 11:07

## 2018-07-04 RX ADMIN — PROCHLORPERAZINE EDISYLATE 10 MG: 5 INJECTION INTRAMUSCULAR; INTRAVENOUS at 05:07

## 2018-07-04 RX ADMIN — METOPROLOL SUCCINATE 25 MG: 25 TABLET, EXTENDED RELEASE ORAL at 09:07

## 2018-07-04 RX ADMIN — POTASSIUM CHLORIDE 10 MEQ: 10 INJECTION, SOLUTION INTRAVENOUS at 10:07

## 2018-07-04 RX ADMIN — PANTOPRAZOLE SODIUM 40 MG: 40 TABLET, DELAYED RELEASE ORAL at 09:07

## 2018-07-04 RX ADMIN — POTASSIUM CHLORIDE 10 MEQ: 10 INJECTION, SOLUTION INTRAVENOUS at 01:07

## 2018-07-04 RX ADMIN — VITAMIN D, TAB 1000IU (100/BT) 5000 UNITS: 25 TAB at 09:07

## 2018-07-04 RX ADMIN — ACYCLOVIR SODIUM 200 MG: 50 INJECTION, SOLUTION INTRAVENOUS at 09:07

## 2018-07-04 RX ADMIN — LISINOPRIL 5 MG: 2.5 TABLET ORAL at 09:07

## 2018-07-04 RX ADMIN — CEFPODOXIME PROXETIL 200 MG: 200 TABLET, FILM COATED ORAL at 08:07

## 2018-07-04 NOTE — ASSESSMENT & PLAN NOTE
- sob and O2 requirements  - chest x-ray with pulmonary edema  - 2 D echo from 7/2 with EF of 30-35%, pulmonary HTN and severe L atrial enlargement  - cardiology consulted, appreciate recs  - Lopressor dc'd and lisinopril and Toprol XL started  - cardiology follow-up as outpatient in 3 months

## 2018-07-04 NOTE — PLAN OF CARE
Problem: Patient Care Overview  Goal: Plan of Care Review  Outcome: Ongoing (interventions implemented as appropriate)  POC reviewed with patient; understanding verbalized. Potassium replacements administered. Pt C/O anxiety and received PRN Xanax; she also C/O nausea and received PRN Ativan. She voids in hat; no BM this shift. Regular diet; better appetite today. Pt. with nonskid footwear on, bed in lowest position, and locked with bed rails up x2.  Pt. instructed to call prior to getting OOB.  Pt. has call light and personal items within reach. VSS and afebrile this shift. All questions and concerns address at this time. Will continue to monitor.

## 2018-07-04 NOTE — ASSESSMENT & PLAN NOTE
- secondary to chemotherapy.   - WBC 0.02 and ANC 0, on Neupogen per [facility] protocol  - hgb 7.6 and platelet count 18,000 today  - tranfuse if Hb < 7 g/dL and Plt < 10,000/mm3 or active bleed.

## 2018-07-04 NOTE — PROGRESS NOTES
[facility]  Hematology  Bone Marrow Transplant  Progress Note    Patient Name: Noreen Mac  Admission Date: 5/24/2018  Hospital Length of Stay: 41 days  Code Status: Full Code    Subjective:     Interval History: Day 37 of 7+3 and Day 20 of FLAG JENN[facility]ER  x1 diarrheal, non bloody, watery bowel movement this morning.  No associated abdominal pain, fevers, chills, nausea, or vomiting.    Objective:     Vital Signs (Most Recent):  Temp: 98.7 °F (37.1 °C) (07/04/18 0400)  Pulse: 83 (07/04/18 0400)  Resp: 16 (07/04/18 0400)  BP: (!) 105/59 (07/04/18 0400)  SpO2: (!) 94 % (07/04/18 0400) Vital Signs (24h Range):  Temp:  [98.5 °F (36.9 °C)-98.7 °F (37.1 °C)] 98.7 °F (37.1 °C)  Pulse:  [78-96] 83  Resp:  [14-16] 16  SpO2:  [94 %-96 %] 94 %  BP: ()/(53-59) 105/59     Weight: 67.3 kg (148 lb 4.2 oz)  Body mass index is 26.26 kg/m².  Body surface area is 1.73 meters squared.    ECOG SCORE         [unfilled]    Intake/Output - Last 3 Shifts       07/02 0700 - 07/03 0659 07/03 0700 - 07/04 0659 07/04 0700 - 07/05 0659    P.O. 1100 720     I.V. (mL/kg) 50 (0.7)      Blood 275      IV Piggyback 1000 500     Total Intake(mL/kg) 2425 (36.1) 1220 (18.2)     Urine (mL/kg/hr) 4200 (2.6) 1700 (1.1)     Total Output 4200 1700      Net -1775 -480             Urine Occurrence  2 x     Stool Occurrence 1 x 2 x           Physical Exam   Constitutional: She is oriented to person, place, and time. She appears well-developed and well-nourished. No distress.   HENT:   Mouth/Throat: Oropharynx is clear and moist. No oropharyngeal exudate or posterior oropharyngeal erythema.   Eyes: Conjunctivae are normal. Pupils are equal, round, and reactive to light. No scleral icterus.   Neck: Normal range of motion. Neck supple. No thyromegaly present.   Cardiovascular: Normal rate, regular rhythm and intact distal pulses.    Murmur (MARISOL LUSB) heard.  Pulmonary/Chest: Effort normal and breath sounds normal. No respiratory distress.    Abdominal: Soft. Bowel sounds are normal. She exhibits no distension. There is no splenomegaly or hepatomegaly. There is no tenderness.   Musculoskeletal: Normal range of motion. She exhibits no edema or tenderness.   Neurological: She is alert and oriented to person, place, and time. No cranial nerve deficit. Coordination normal.   Skin: No rash noted. No cyanosis. No pallor. Nails show no clubbing.   PICC intact R arm no redness or drainage     Psychiatric: She has a normal mood and affect. Thought content normal.   Vitals reviewed.      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 Ordered routine labs in addition to FLT3, NGS, and AML FISH.  - lo res HLA typing on 5/26/18 and hi res on 5/27/18 done in anticipation of possible need for future transplant   - Pt with two daughters, two full sisters (in their mid 60s, one with brain aneurysm hx, other one without any medical issues), and one full brother (59 y/o healthy).  - ECHO ordered 5/25/18, EF 65%  - PICC line placed 5/25/18   - initially on hydrea, discontinued on 5/28.   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  - started on induction therapy on  5/29/18.  - Day 36 of 7+3.    - allopurinol stopped 6/11  - prophylactic antimicrobials: Acyclovir, Voriconazole  - will need CNS Prophylaxis given her elevated WBC (high risk) on admission after count recovery.  - NPM1 +, CEBPA (-), FLT3 (+).  On Midostaurin 50 mg PO BID until Day 14 (held starting 6/8 to 6/11). Stopped when [facility]ER re-induction started. Restarted Midostaurin at 25 mg bid on day 8 of FLAG JENNIFER induction (6/22), increase to full dose 50 mg bid (6/26) as improvement in liver enzymes. Stopped 7/3/18 due to abnormal cardiac echo.  - day 14 bone marrow biopsy completed 6/11 showing persistent disease with 15% CD 34 positive blasts  - Day 18 of FLAG-JENNIFER, tolerating without difficulty except nausea/vomiting  - repeat 2D echo on 6/15 with preserved EF of 60%.  However 7/2/18 echo with reduced EF 30-35%  - day 14 restaging bone marrow biopsy done 6/29 without complication, results with no evidence of AML          Chemotherapy induced cardiomyopathy    - sob and O2 requirements  - chest x-ray with pulmonary edema  - 2 D echo from 7/2 with EF of 30-35%, pulmonary HTN and severe L atrial enlargement  - cardiology consulted, appreciate recs  - Lopressor dc'd and lisinopril and Toprol XL started  - cardiology follow-up as outpatient in 3 months        Hypophosphatemia    - phos wnl at 3.6 this am  - replace IV per electrolyte protocol        Hypokalemia    - K at 3.4 today        CINV (chemotherapy-induced nausea and vomiting)    - completed olanzapine daily x 6 days  - prn Zofran and Ativan, will stop Zofran due to QTc prolongation  - improved with scheduled Compazine IV q6hr  - no recent emesis  - continue to convert meds from IV to po        Hypocalcemia    --Corrected calcium 9.2 today.  --patient refusing scheduled po calcium replacement due to nausea            Abnormal liver enzymes    AST and ALT have normalized. Alk phos improved to 387 today.   -   - midostaurin started day 8 at 25 mg bid,  monitoring liver enzymes closely and improved. Increasing Midostaurin to 50 mg bid 6/26. Stopped Midostaurin (7/3) due to new diagnosis of systolic heart failure EF 35%.  - 6/22/18 liver US showing mild intrahepatic dilation, otherwise impression of hepatic steatosis.  Will hold off of MRCP at this time.          Hypomagnesemia    - Mag 1.9 today  - replace IV per electrolyte protocol          Pancytopenia    - secondary to chemotherapy.   - WBC 0.02 and ANC 0, on Neupogen per [facility] protocol  - hgb 7.6 and platelet count 18,000 today  - tranfuse if Hb < 7 g/dL and Plt < 10,000/mm3 or active bleed.                MCTD (mixed connective tissue disease)    - MCTD-NOS  - previously on MTX and Prednisone.   - off therapy now and otherwise doing well.   - can follow up outpatient with her Rheumatologist upon discharge.         Staphylococcus epidermidis bacteremia    - blood cultures from 6/11 at 0430 with gram + cocci, staph epidermis sensitive for Vancomycin, CXR and UA unremarkable.   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  - CT Chest on 5/28: Multiple scattered noncalcified pulmonary granulomas, the largest measuring 8 mm in the lateral segment of the right middle lobe.   - repeat non-contrast chest CT at 6-12 months         Insomnia    - PRN nightly Xanax.         HTN (hypertension)    - history of SVT and reports intermittent palpitations at times.  - EKG on 5/28 - NSR.  - increased metoprolol XL to  25 mg QD,she feels better with this dose.  - metoprolol tartrate changed on 6/13 to 25 mg bid (hold for SBP <100) due to hypotension  - cardiology consulted for abnormal echo and lopressor stopped and lisinopril and Toprol XL started  --HTN well controlled        Osteoporosis    - takes prolia once every 6 months, last given mid April 2018   - no acute issue         Depression    - no acute issue   - continue home Escitalopram             VTE Risk Mitigation         Ordered     heparin, porcine (PF) 100 unit/mL injection flush 300 Units  As needed (PRN)      06/15/18 1657     Place MAYLIN hose  Until discontinued      05/25/18 0032     IP VTE HIGH RISK PATIENT  Once      05/25/18 0032          Disposition: inpatient    Alex Chu MD  Bone Marrow Transplant  [facility]

## 2018-07-04 NOTE — ASSESSMENT & PLAN NOTE
--Corrected calcium 9.2 today.  --patient refusing scheduled po calcium replacement due to nausea

## 2018-07-04 NOTE — SUBJECTIVE & OBJECTIVE
Subjective:     Interval History: Day 37 of 7+3 and Day 20 of FLAG [facility]ENN[facility]ER  x1 diarrheal, non bloody, watery bowel movement this morning.  No associated abdominal pain, fevers, chills, nausea, or vomiting.    Objective:     Vital Signs (Most Recent):  Temp: 98.7 °F (37.1 °C) (07/04/18 0400)  Pulse: 83 (07/04/18 0400)  Resp: 16 (07/04/18 0400)  BP: (!) 105/59 (07/04/18 0400)  SpO2: (!) 94 % (07/04/18 0400) Vital Signs (24h Range):  Temp:  [98.5 °F (36.9 °C)-98.7 °F (37.1 °C)] 98.7 °F (37.1 °C)  Pulse:  [78-96] 83  Resp:  [14-16] 16  SpO2:  [94 %-96 %] 94 %  BP: ()/(53-59) 105/59     Weight: 67.3 kg (148 lb 4.2 oz)  Body mass index is 26.26 kg/m².  Body surface area is 1.73 meters squared.    ECOG SCORE         [unfilled]    Intake/Output - Last 3 Shifts       07/02 0700 - 07/03 0659 07/03 0700 - 07/04 0659 07/04 0700 - 07/05 0659    P.O. 1100 720     I.V. (mL/kg) 50 (0.7)      Blood 275      IV Piggyback 1000 500     Total Intake(mL/kg) 2425 (36.1) 1220 (18.2)     Urine (mL/kg/hr) 4200 (2.6) 1700 (1.1)     Total Output 4200 1700      Net -1775 -480             Urine Occurrence  2 x     Stool Occurrence 1 x 2 x           Physical Exam   Constitutional: She is oriented to person, place, and time. She appears well-developed and well-nourished. No distress.   HENT:   Mouth/Throat: Oropharynx is clear and moist. No oropharyngeal exudate or posterior oropharyngeal erythema.   Eyes: Conjunctivae are normal. Pupils are equal, round, and reactive to light. No scleral icterus.   Neck: Normal range of motion. Neck supple. No thyromegaly present.   Cardiovascular: Normal rate, regular rhythm and intact distal pulses.    Murmur (MARISOL LUSB) heard.  Pulmonary/Chest: Effort normal and breath sounds normal. No respiratory distress.   Abdominal: Soft. Bowel sounds are normal. She exhibits no distension. There is no splenomegaly or hepatomegaly. There is no tenderness.   Musculoskeletal: Normal range of motion. She exhibits no edema  or tenderness.   Neurological: She is alert and oriented to person, place, and time. No cranial nerve deficit. Coordination normal.   Skin: No rash noted. No cyanosis. No pallor. Nails show no clubbing.   PICC intact R arm no redness or drainage     Psychiatric: She has a normal mood and affect. Thought content normal.   Vitals reviewed.      Significant Labs:   CBC:   Recent Labs  Lab 07/03/18  0400 07/04/18  0400   WBC 0.02* 0.02*   HGB 7.8* 7.6*   HCT 22.8* 22.5*   PLT 33* 18*    and CMP:   Recent Labs  Lab 07/03/18  0400 07/03/18  1612 07/04/18  0400    135* 138   K 4.1 3.7 3.4*    105 106   CO2 24 22* 24    100 100   BUN 6* 6* 6*   CREATININE 0.6 0.6 0.6   CALCIUM 8.3* 8.2* 8.1*   PROT 5.6* 5.7* 5.6*   ALBUMIN 2.6* 2.7* 2.6*   BILITOT 0.8 0.9 0.8   ALKPHOS 387* 374* 348*   AST 8* 7* 8*   ALT 6* 5* 5*   ANIONGAP 7* 8 8   EGFRNONAA >60.0 >60.0 >60.0       Diagnostic Results:  None

## 2018-07-04 NOTE — ASSESSMENT & PLAN NOTE
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 Ordered routine labs in addition to FLT3, NGS, and AML FISH.  - lo res HLA typing on 5/26/18 and hi res on 5/27/18 done in anticipation of possible need for future transplant   - Pt with two daughters, two full sisters (in their mid 60s, one with brain aneurysm hx, other one without any medical issues), and one full brother (61 y/o healthy).  - ECHO ordered 5/25/18, EF 65%  - PICC line placed 5/25/18   - initially on hydrea, discontinued on 5/28.   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  - started on induction therapy on 5/29/18.  - Day 36 of 7+3.    - allopurinol stopped 6/11  - prophylactic antimicrobials: Acyclovir, Voriconazole  - will need CNS Prophylaxis given her elevated WBC (high risk) on admission after count recovery.  - NPM1 +, CEBPA (-), FLT3 (+).  On Midostaurin 50 mg PO BID until Day 14 (held starting 6/8 to 6/11). Stopped when [facility] JENN[facility]ER re-induction started. Restarted Midostaurin at 25 mg bid on day 8 of FLAG JENN[facility]ER induction (6/22), increase to full dose 50 mg bid (6/26) as improvement in liver enzymes. Stopped 7/3/18 due to abnormal cardiac echo.  - day 14 bone marrow biopsy completed 6/11 showing persistent disease with 15% CD 34 positive blasts  - Day 18 of FLAG-JENNIFER, tolerating without difficulty except nausea/vomiting  - repeat 2D echo on 6/15 with preserved EF of 60%.  However 7/2/18 echo with reduced EF 30-35%  - day 14 restaging bone marrow biopsy done 6/29 without complication, results with no evidence of AML

## 2018-07-04 NOTE — PLAN OF CARE
Problem: Patient Care Overview  Goal: Plan of Care Review  Outcome: Ongoing (interventions implemented as appropriate)  Patient is progressing and involved with plan of care. Frequent rounds made to assess pain and safety. Pt communicating needs throughout shift. Up ad gunnar. Tolerating diet, voiding without difficulty. No c/o pain but anxiety noted. Controlled with prn Xanax. Electrolytes replaced this shift, neutropenic precautions maintained. All vitals stable; no acute events this shift. Pt. Remaining free from falls or injury throughout shift; bed in lowest position; side rails up X2; call light within reach; pt instructed to call for assistance as needed - verbalized understanding. Q2h rounding on patient. Will continue to monitor.

## 2018-07-04 NOTE — ASSESSMENT & PLAN NOTE
- history of SVT and reports intermittent palpitations at times.  - EKG on 5/28 - NSR.  - increased metoprolol XL to 25 mg QD,she feels better with this dose.  - metoprolol tartrate changed on 6/13 to 25 mg bid (hold for SBP <100) due to hypotension  - cardiology consulted for abnormal echo and lopressor stopped and lisinopril and Toprol XL started  --HTN well controlled

## 2018-07-04 NOTE — ASSESSMENT & PLAN NOTE
AST and ALT have normalized. Alk phos improved to 387 today.   -   - midostaurin started day 8 at 25 mg bid, monitoring liver enzymes closely and improved. Increasing Midostaurin to 50 mg bid 6/26. Stopped Midostaurin (7/3) due to new diagnosis of systolic heart failure EF 35%.  - 6/22/18 liver US showing mild intrahepatic dilation, otherwise impression of hepatic steatosis.  Will hold off of MRCP at this time.

## 2018-07-05 PROBLEM — E83.51 HYPOCALCEMIA: Status: RESOLVED | Noted: 2018-06-15 | Resolved: 2018-07-05

## 2018-07-05 PROBLEM — E43 SEVERE MALNUTRITION: Status: ACTIVE | Noted: 2018-07-05

## 2018-07-05 LAB
ALBUMIN SERPL BCP-MCNC: 2.5 G/DL
ALBUMIN SERPL BCP-MCNC: 2.7 G/DL
ALP SERPL-CCNC: 329 U/L
ALP SERPL-CCNC: 332 U/L
ALT SERPL W/O P-5'-P-CCNC: 5 U/L
ALT SERPL W/O P-5'-P-CCNC: 5 U/L
ANION GAP SERPL CALC-SCNC: 6 MMOL/L
ANION GAP SERPL CALC-SCNC: 9 MMOL/L
ANISOCYTOSIS BLD QL SMEAR: SLIGHT
AST SERPL-CCNC: 7 U/L
AST SERPL-CCNC: 8 U/L
BASOPHILS # BLD AUTO: ABNORMAL K/UL
BASOPHILS NFR BLD: 0 %
BILIRUB SERPL-MCNC: 0.8 MG/DL
BILIRUB SERPL-MCNC: 0.8 MG/DL
BUN SERPL-MCNC: 8 MG/DL
BUN SERPL-MCNC: 9 MG/DL
CALCIUM SERPL-MCNC: 8.3 MG/DL
CALCIUM SERPL-MCNC: 8.9 MG/DL
CHLORIDE SERPL-SCNC: 103 MMOL/L
CHLORIDE SERPL-SCNC: 107 MMOL/L
CO2 SERPL-SCNC: 22 MMOL/L
CO2 SERPL-SCNC: 26 MMOL/L
CREAT SERPL-MCNC: 0.6 MG/DL
CREAT SERPL-MCNC: 0.6 MG/DL
DIFFERENTIAL METHOD: ABNORMAL
EOSINOPHIL # BLD AUTO: ABNORMAL K/UL
EOSINOPHIL NFR BLD: 0 %
ERYTHROCYTE [DISTWIDTH] IN BLOOD BY AUTOMATED COUNT: 13.3 %
EST. GFR  (AFRICAN AMERICAN): >60 ML/MIN/1.73 M^2
EST. GFR  (AFRICAN AMERICAN): >60 ML/MIN/1.73 M^2
EST. GFR  (NON AFRICAN AMERICAN): >60 ML/MIN/1.73 M^2
EST. GFR  (NON AFRICAN AMERICAN): >60 ML/MIN/1.73 M^2
FLT3 RESULT: NORMAL
GLUCOSE SERPL-MCNC: 106 MG/DL
GLUCOSE SERPL-MCNC: 95 MG/DL
HCT VFR BLD AUTO: 21.4 %
HGB BLD-MCNC: 7.3 G/DL
HYPOCHROMIA BLD QL SMEAR: ABNORMAL
IMM GRANULOCYTES # BLD AUTO: ABNORMAL K/UL
IMM GRANULOCYTES NFR BLD AUTO: ABNORMAL %
LYMPHOCYTES # BLD AUTO: ABNORMAL K/UL
LYMPHOCYTES NFR BLD: 100 %
MAGNESIUM SERPL-MCNC: 1.8 MG/DL
MAGNESIUM SERPL-MCNC: 2 MG/DL
MCH RBC QN AUTO: 30.5 PG
MCHC RBC AUTO-ENTMCNC: 34.1 G/DL
MCV RBC AUTO: 90 FL
MONOCYTES # BLD AUTO: ABNORMAL K/UL
MONOCYTES NFR BLD: 0 %
NEUTROPHILS NFR BLD: 0 %
NPM1 FINAL DIAGNOSIS (BONE MARROW): NORMAL
NPM1 SPECIMEN TYPE (BONE MARROW): NORMAL
NRBC BLD-RTO: 0 /100 WBC
PATH REPORT.FINAL DX SPEC: NORMAL
PHOSPHATE SERPL-MCNC: 2.6 MG/DL
PHOSPHATE SERPL-MCNC: 3.5 MG/DL
PLATELET # BLD AUTO: 10 K/UL
PLATELET BLD QL SMEAR: ABNORMAL
PMV BLD AUTO: 10.9 FL
POTASSIUM SERPL-SCNC: 3.6 MMOL/L
POTASSIUM SERPL-SCNC: 3.8 MMOL/L
PROT SERPL-MCNC: 5.6 G/DL
PROT SERPL-MCNC: 5.9 G/DL
RBC # BLD AUTO: 2.39 M/UL
SODIUM SERPL-SCNC: 135 MMOL/L
SODIUM SERPL-SCNC: 138 MMOL/L
SPECIMEN TYPE: NORMAL
WBC # BLD AUTO: 0.03 K/UL

## 2018-07-05 PROCEDURE — 80053 COMPREHEN METABOLIC PANEL: CPT

## 2018-07-05 PROCEDURE — 63600175 PHARM REV CODE 636 W HCPCS: Mod: JG | Performed by: INTERNAL MEDICINE

## 2018-07-05 PROCEDURE — 25000003 PHARM REV CODE 250: Performed by: INTERNAL MEDICINE

## 2018-07-05 PROCEDURE — 25000003 PHARM REV CODE 250: Performed by: NURSE PRACTITIONER

## 2018-07-05 PROCEDURE — 84100 ASSAY OF PHOSPHORUS: CPT | Mod: 91

## 2018-07-05 PROCEDURE — 99233 SBSQ HOSP IP/OBS HIGH 50: CPT | Mod: ,,, | Performed by: INTERNAL MEDICINE

## 2018-07-05 PROCEDURE — 93010 ELECTROCARDIOGRAM REPORT: CPT | Mod: ,,, | Performed by: INTERNAL MEDICINE

## 2018-07-05 PROCEDURE — 83735 ASSAY OF MAGNESIUM: CPT

## 2018-07-05 PROCEDURE — 93005 ELECTROCARDIOGRAM TRACING: CPT

## 2018-07-05 PROCEDURE — 63600175 PHARM REV CODE 636 W HCPCS: Performed by: INTERNAL MEDICINE

## 2018-07-05 PROCEDURE — 25000003 PHARM REV CODE 250: Performed by: STUDENT IN AN ORGANIZED HEALTH CARE EDUCATION/TRAINING PROGRAM

## 2018-07-05 PROCEDURE — 63600175 PHARM REV CODE 636 W HCPCS: Performed by: NURSE PRACTITIONER

## 2018-07-05 PROCEDURE — 85027 COMPLETE CBC AUTOMATED: CPT

## 2018-07-05 PROCEDURE — 25000003 PHARM REV CODE 250: Performed by: HOSPITALIST

## 2018-07-05 PROCEDURE — 85007 BL SMEAR W/DIFF WBC COUNT: CPT

## 2018-07-05 PROCEDURE — 20600001 HC STEP DOWN PRIVATE ROOM

## 2018-07-05 RX ORDER — ACYCLOVIR 200 MG/1
400 CAPSULE ORAL 2 TIMES DAILY
Status: DISCONTINUED | OUTPATIENT
Start: 2018-07-05 | End: 2018-07-13 | Stop reason: HOSPADM

## 2018-07-05 RX ORDER — POTASSIUM CHLORIDE 20 MEQ/1
20 TABLET, EXTENDED RELEASE ORAL ONCE
Status: COMPLETED | OUTPATIENT
Start: 2018-07-05 | End: 2018-07-05

## 2018-07-05 RX ADMIN — FUROSEMIDE 20 MG: 20 TABLET ORAL at 08:07

## 2018-07-05 RX ADMIN — MAGNESIUM SULFATE IN WATER 2 G: 40 INJECTION, SOLUTION INTRAVENOUS at 05:07

## 2018-07-05 RX ADMIN — PROCHLORPERAZINE EDISYLATE 10 MG: 5 INJECTION INTRAMUSCULAR; INTRAVENOUS at 11:07

## 2018-07-05 RX ADMIN — ALPRAZOLAM 0.25 MG: 0.25 TABLET ORAL at 08:07

## 2018-07-05 RX ADMIN — ACYCLOVIR SODIUM 200 MG: 50 INJECTION, SOLUTION INTRAVENOUS at 08:07

## 2018-07-05 RX ADMIN — PROCHLORPERAZINE EDISYLATE 10 MG: 5 INJECTION INTRAMUSCULAR; INTRAVENOUS at 05:07

## 2018-07-05 RX ADMIN — CEFPODOXIME PROXETIL 200 MG: 200 TABLET, FILM COATED ORAL at 08:07

## 2018-07-05 RX ADMIN — FILGRASTIM 480 MCG: 480 INJECTION, SOLUTION INTRAVENOUS; SUBCUTANEOUS at 08:07

## 2018-07-05 RX ADMIN — ACYCLOVIR 400 MG: 200 CAPSULE ORAL at 08:07

## 2018-07-05 RX ADMIN — VORICONAZOLE 200 MG: 200 TABLET ORAL at 08:07

## 2018-07-05 RX ADMIN — POTASSIUM CHLORIDE 20 MEQ: 1500 TABLET, EXTENDED RELEASE ORAL at 11:07

## 2018-07-05 RX ADMIN — LISINOPRIL 5 MG: 2.5 TABLET ORAL at 08:07

## 2018-07-05 RX ADMIN — VITAMIN D, TAB 1000IU (100/BT) 5000 UNITS: 25 TAB at 08:07

## 2018-07-05 RX ADMIN — ESCITALOPRAM 10 MG: 5 TABLET, FILM COATED ORAL at 08:07

## 2018-07-05 RX ADMIN — PANTOPRAZOLE SODIUM 40 MG: 40 TABLET, DELAYED RELEASE ORAL at 08:07

## 2018-07-05 RX ADMIN — SODIUM PHOSPHATE, MONOBASIC, MONOHYDRATE 15 MMOL: 276; 142 INJECTION, SOLUTION INTRAVENOUS at 05:07

## 2018-07-05 RX ADMIN — METOPROLOL SUCCINATE 25 MG: 25 TABLET, EXTENDED RELEASE ORAL at 08:07

## 2018-07-05 RX ADMIN — ALPRAZOLAM 0.25 MG: 0.25 TABLET ORAL at 12:07

## 2018-07-05 NOTE — ASSESSMENT & PLAN NOTE
- sob and O2 requirements--resolved  - chest x-ray with pulmonary edema  - 2 D echo from 7/2 with EF of 30-35%, pulmonary HTN and severe L atrial enlargement  - cardiology following, appreciate recs  - Lopressor dc'd and lisinopril and Toprol XL and po lasix started on 7/3  - cardiology follow-up as outpatient in 3 months

## 2018-07-05 NOTE — PLAN OF CARE
Problem: Patient Care Overview  Goal: Plan of Care Review    Recommendations    Recommendation/Intervention:     1. Continue w/ Regular diet.   2.Encourage PO intake >/= 75% EEN/EPN   3. If PO intake is <50% encourage HVP w/ each meal, small frequent meal, snacks, ONS   4.RD to follow    Goals: pt to consume nutrients >/= 85% EEN/EPN   Nutrition Goal Status: progressing towards goal

## 2018-07-05 NOTE — ASSESSMENT & PLAN NOTE
- secondary to chemotherapy.   - WBC 0.03 and ANC 0, on Neupogen per [facility]ER protocol  - hgb 7.3 and platelet count 10,000 today  - tranfuse if Hb < 7 g/dL and Plt < 10,000/mm3 or active bleed.

## 2018-07-05 NOTE — PROGRESS NOTES
"[facility]  Hematology  Bone Marrow Transplant  Progress Note    Patient Name: Noreen Mac  Admission Date: 5/24/2018  Hospital Length of Stay: 42 days  Code Status: Full Code    Subjective:     Interval History:   Day 38 of 7+3 ane Day 21 of FLAG JENNIFER. No CP or SOB, cardiology following. On RA. All meds changed to po, denies nausea or vomiting and no diarrhea. VSS, afebrile. No complaints this am and states "she is ready to go home"    Objective:     Vital Signs (Most Recent):  Temp: 97.8 °F (36.6 °C) (07/05/18 1138)  Pulse: 80 (07/05/18 1138)  Resp: 19 (07/05/18 1138)  BP: 132/68 (07/05/18 1138)  SpO2: 96 % (07/05/18 1138) Vital Signs (24h Range):  Temp:  [97.1 °F (36.2 °C)-99.2 °F (37.3 °C)] 97.8 °F (36.6 °C)  Pulse:  [] 80  Resp:  [17-20] 19  SpO2:  [92 %-96 %] 96 %  BP: ()/(55-68) 132/68     Weight: 65.7 kg (144 lb 13.5 oz)  Body mass index is 25.66 kg/m².  Body surface area is 1.71 meters squared.      Intake/Output - Last 3 Shifts       07/03 0700 - 07/04 0659 07/04 0700 - 07/05 0659 07/05 0700 - 07/06 0659    P.O. 720 400 400    I.V. (mL/kg)  100 (1.5)     IV Piggyback 500 750 50    Total Intake(mL/kg) 1220 (18.2) 1250 (19) 450 (6.8)    Urine (mL/kg/hr) 1700 (1.1) 900 (0.6) 400 (0.8)    Emesis/NG output  0 (0)     Total Output 1700 900 400    Net -480 +350 +50           Urine Occurrence 2 x  1 x    Stool Occurrence 2 x  1 x          Physical Exam   Constitutional: She is oriented to person, place, and time. She appears well-developed and well-nourished. No distress.   HENT:   Mouth/Throat: Oropharynx is clear and moist. No oropharyngeal exudate or posterior oropharyngeal erythema.   Eyes: Conjunctivae are normal. Pupils are equal, round, and reactive to light. No scleral icterus.   Neck: Normal range of motion. Neck supple. No thyromegaly present.   Cardiovascular: Normal rate, regular rhythm, normal heart sounds and intact distal pulses.    Pulmonary/Chest: Effort normal " and breath sounds normal. No respiratory distress.   Abdominal: Soft. Bowel sounds are normal. She exhibits no distension. There is no splenomegaly or hepatomegaly. There is no tenderness.   Musculoskeletal: Normal range of motion. She exhibits no edema or tenderness.   Neurological: She is alert and oriented to person, place, and time. No cranial nerve deficit. Coordination normal.   Skin: No rash noted. No cyanosis. No pallor. Nails show no clubbing.   PICC intact R arm no redness or drainage     Psychiatric: She has a normal mood and affect. Thought content normal.   Vitals reviewed.      Significant Labs:   CBC:   Recent Labs  Lab 07/04/18  0400 07/05/18  0400   WBC 0.02* 0.03*   HGB 7.6* 7.3*   HCT 22.5* 21.4*   PLT 18* 10*   , CMP:   Recent Labs  Lab 07/04/18  0400 07/04/18  1504 07/05/18  0400    136 138   K 3.4* 4.3 3.8    106 107   CO2 24 24 22*    103 95   BUN 6* 8 8   CREATININE 0.6 0.6 0.6   CALCIUM 8.1* 8.8 8.3*   PROT 5.6* 6.1 5.6*   ALBUMIN 2.6* 2.8* 2.5*   BILITOT 0.8 0.8 0.8   ALKPHOS 348* 366* 332*   AST 8* 8* 8*   ALT 5* 5* 5*   ANIONGAP 8 6* 9   EGFRNONAA >60.0 >60.0 >60.0   , Coagulation: No results for input(s): PT, INR, APTT in the last 48 hours., LDH: No results for input(s): LDHCSF, BFSOURCE in the last 48 hours. and Uric Acid No results for input(s): URICACID in the last 48 hours.    Diagnostic Results:  I have reviewed all pertinent imaging results/findings within the past 24 hours.    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 Ordered routine labs in addition to FLT3, NGS, and AML FISH.  - lo res HLA typing on 5/26/18 and hi res on 5/27/18 done in anticipation of possible need for future transplant   - Pt with two daughters, two full  sisters (in their mid 60s, one with brain aneurysm hx, other one without any medical issues), and one full brother (59 y/o healthy).  - ECHO ordered 5/25/18, EF 65%  - PICC line placed 5/25/18   - initially on hydrea, discontinued on 5/28.   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  - started on induction therapy on 5/29/18.  - Day 37 of 7+3.    - allopurinol stopped 6/11  - prophylactic antimicrobials: Acyclovir, Voriconazole  - will need CNS Prophylaxis given her elevated WBC (high risk) on admission after count recovery.  - NPM1 +, CEBPA (-), FLT3 (+).  On Midostaurin 50 mg PO BID until Day 14 (held starting 6/8 to 6/11). Stopped when FLAG JENNIFER re-induction started. Restarted Midostaurin at 25 mg bid on day 8 of FLAG JENNIFER induction (6/22), increase to full dose 50 mg bid (6/26) as improvement in liver enzymes. Stopped 7/3/18 due to abnormal cardiac echo.  - day 14 bone marrow biopsy completed 6/11 showing persistent disease with 15% CD 34 positive blasts  - Day 21 of FLAG-JENNIFER, tolerating without difficulty except nausea/vomiting  - repeat 2D echo on 6/15 with preserved EF of 60%.  However 7/2/18 echo with reduced EF 30-35%  - day 14 restaging bone marrow biopsy done 6/29 without complication, results with no evidence of AML          Staphylococcus epidermidis bacteremia    - blood cultures from 6/11 at 0430 with gram + cocci, staph epidermis sensitive for Vancomycin, CXR and UA unremarkable.   - surveillance blood cultures repeated 6/13 NGTD  - de-escalated aztreonam back to prophylactic cipro 6/16  - ID consulted on 6/19, rec continue Vanc for 2 weeks post last negative culture (EOT 6/27)  - BC from 6/11 with gram negative rods, leptotrichia goodfellowii  - completed Flagyl po x 5 days (EOT 6/24)  - ID has signed off  - patient remains afebrile        Pancytopenia    - secondary to chemotherapy.   - WBC 0.03 and ANC 0, on Neupogen per FLAG JENNIFER protocol  - hgb 7.3 and platelet count 10,000  today  - tranfuse if Hb < 7 g/dL and Plt < 10,000/mm3 or active bleed.                Abnormal liver enzymes    AST and ALT have normalized. Alk phos improved to 332 today.   -   - midostaurin started day 8 at 25 mg bid, monitoring liver enzymes closely and improved. Increasing Midostaurin to 50 mg bid 6/26. Stopped Midostaurin (7/3) due to new diagnosis of systolic heart failure EF 35%.  - 6/22/18 liver US showing mild intrahepatic dilation, otherwise impression of hepatic steatosis.  Will hold off of MRCP at this time.          Chemotherapy induced cardiomyopathy    - sob and O2 requirements--resolved  - chest x-ray with pulmonary edema  - 2 D echo from 7/2 with EF of 30-35%, pulmonary HTN and severe L atrial enlargement  - cardiology following, appreciate recs  - Lopressor dc'd and lisinopril and Toprol XL and po lasix started on 7/3  - cardiology follow-up as outpatient in 3 months        Hypophosphatemia    - phos wnl at 2.6 this am  - replace IV per electrolyte protocol        Hypokalemia    - K at 3.8 today, will give 20 meq of potassium to keep around 4        CINV (chemotherapy-induced nausea and vomiting)    - completed olanzapine daily x 6 days  - prn Zofran and Ativan, Zofran stopped due to QTc prolongation  - improved with scheduled Compazine IV q6hr  - no recent emesis  - continue to convert meds from IV to po (will change acyclovir today)        Hypomagnesemia    - Mag 1.8 today  - replace IV per electrolyte protocol          MCTD (mixed connective tissue disease)    - MCTD-NOS  - previously on MTX and Prednisone.   - off therapy now and otherwise doing well.   - can follow up outpatient with her Rheumatologist upon discharge.         Pulmonary nodule    - CXR suggestive of 7 mm slightly irregular shaped radiodensity projected over the left upper lung zone which could represent a calcified granuloma but remains indeterminate.  - CT Chest on 5/28: Multiple scattered noncalcified pulmonary  granulomas, the largest measuring 8 mm in the lateral segment of the right middle lobe.   - repeat non-contrast chest CT at 6-12 months         Insomnia    - PRN nightly Xanax.         HTN (hypertension)    - history of SVT and reports intermittent palpitations at times.  - EKG on 5/28 - NSR.  - increased metoprolol XL to 25 mg QD,she feels better with this dose.  - metoprolol tartrate changed on 6/13 to 25 mg bid (hold for SBP <100) due to hypotension  - cardiology consulted for abnormal echo and lopressor stopped and lisinopril and Toprol XL started  --HTN well controlled        Osteoporosis    - takes prolia once every 6 months, last given mid April 2018   - no acute issue         Depression    - no acute issue   - continue home Escitalopram             VTE Risk Mitigation         Ordered     heparin, porcine (PF) 100 unit/mL injection flush 300 Units  As needed (PRN)      06/15/18 1657     Place MAYLIN hose  Until discontinued      05/25/18 0032     IP VTE HIGH RISK PATIENT  Once      05/25/18 0032          Disposition: inpatient supportive care until count recovery    Sweetie Lepe NP  Bone Marrow Transplant  [facility]

## 2018-07-05 NOTE — PHYSICIAN QUERY
PT Name: Noreen Mac  MR #: [id number]     Physician Query Form - Documentation Clarification      Madonna Harmon RN CDS    Contact Information: [phone]    This form is a permanent document in the medical record.     Query Date: July 5, 2018    By submitting this query, we are merely seeking further clarification of documentation. Please utilize your independent clinical judgment when addressing the question(s) below.    The Medical record reflects the following:    Supporting Clinical Findings Location in Medical Record   Positive for activity change and fatigue.       2 D echo from 7/2 with EF of 30-35%, pulmonary HTN and severe L atrial enlargement      5/25 H&P        7/5 Bone Marrow Transplant     Severe left atrial enlargement.   2 - Mild left ventricular enlargement.   3 - Eccentric hypertrophy.   4 - Moderately depressed left ventricular systolic function (EF 30-35%).   5 - Quantitatively measured LV function is 36%.   6 - Wall motion abnormalities.   7 - Impaired LV relaxation, elevated LAP (grade 2 diastolic dysfunction).   8 - Normal right ventricular systolic function .   9 - Pulmonary hypertension. The estimated PA systolic pressure is 51 mmHg.   10 - Moderate mitral regurgitation.   11 - Mild tricuspid regurgitation.   12 - Trivial pericardial effusion.          7/2 2D Echo                                                                            Doctor, Please specify diagnosis or diagnoses associated with above clinical findings.    Provider Use Only      [  ] Pulmonary Arterial Hypertension    [  ] Pulmonary Hypertension due to left heart disease    [  ] Other Pulmonary Hypertension (Specify)__________________________                                                                                                               [  x] Clinically undetermined

## 2018-07-05 NOTE — ASSESSMENT & PLAN NOTE
- completed olanzapine daily x 6 days  - prn Zofran and Ativan, Zofran stopped due to QTc prolongation  - improved with scheduled Compazine IV q6hr  - no recent emesis  - continue to convert meds from IV to po (will change acyclovir today)

## 2018-07-05 NOTE — PLAN OF CARE
MDR's with Dr Guerrier.  Patient is s/p 7+3 and is day 21 of FLAG-Maribel.  BMB from 6/29 shows no residual AML.  ANC 0 and awaiting count recovery.  Cards consulted and following for new onset heart failure.  D/c pending count recovery.  Will continue to follow.

## 2018-07-05 NOTE — PROGRESS NOTES
" [facility]  Adult Nutrition  Progress Note    SUMMARY       Recommendations    Recommendation/Intervention:     1. Continue w/ Regular diet.   2.Encourage PO intake >/= 75% EEN/EPN   3. 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 Pt says she is eating 25-40% of meals. Denies N/V. Doesn't like boost. States that appetite PTA was not good for a couple months. Reports eating ~ 1 meal/day. Does not know if she was had wt change. Pt is malnourished 2/2 poor po intake, loss of muscle mass, and wt loss.   Nutrition Discharge Planning: Regular diet w/ po intake 75% EEN/ EPN + ONS as needed to maintain elevated Kcal/ Protein needs    Nutrition Risk Screen    Nutrition Risk Screen: no indicators present    Nutrition/Diet History    Patient Reported Diet/Restrictions/Preferences: general  Food Preferences: none  Do you have any cultural, spiritual, Congregational conflicts, given your current situation?: none noted   Food Allergies: NKFA  Factors Affecting Nutritional Intake: decreased appetite    Anthropometrics    Temp: 97.8 °F (36.6 °C)  Height Method: Stated  Height: 5' 3" (160 cm) (per RN 6/15/18)  Height (inches): 63 in  Weight Method: Standard Scale  Weight: 65.7 kg (144 lb 13.5 oz)  Weight (lb): 144.84 lb  Ideal Body Weight (IBW), Female: 115 lb  % Ideal Body Weight, Female (lb): 125.95 lb  BMI (Calculated): 25.7  BMI Grade: 25 - 29.9 - overweight  Usual Body Weight (UBW), k.8 kg (per chart review - office visit 18)  % Usual Body Weight: 86.86  % " Weight Change From Usual Weight: -13.33 %     Lab/Procedures/Meds    Pertinent Labs Reviewed: reviewed  Pertinent Labs Comments: phos 2.6, alk phos 332, AST 8, ALT 5, Vit D 17  Pertinent Medications Reviewed: reviewed  Pertinent Medications Comments: lasix, pantoprazole, Vitamin D    Physical Findings/Assessment    Overall Physical Appearance: weak, loss of subcutaneous fat  Tubes: other (see comments)  Oral/Mouth Cavity: WDL  Skin: edema    Estimated/Assessed Needs    Weight Used For Calorie Calculations: 71.7 kg (158 lb 1.1 oz) (+13.8 L fl)  Energy Calorie Requirements (kcal): 2151-2510kcal/d  Energy Need Method: Kcal/kg (30-35kcal/kg)  Protein Requirements: 108-144g/d (1.5-1.8g/kg)  Weight Used For Protein Calculations: 71.7 kg (158 lb 1.1 oz)  Fluid Requirements (mL): 1ml per KCal or Per MD  Fluid Need Method: RDA Method  RDA Method (mL): 2151  CHO Requirement: NA    Nutrition Prescription Ordered    Current Diet Order: regular  Oral Nutrition Supplement: denied 2/2 taste    Evaluation of Received Nutrient/Fluid Intake    I/O: -8.64L since admit  Comments: LBM 7/4     % Intake of Estimated Energy Needs: 25 - 50 %  % Meal Intake: 25 - 50 %    Nutrition Risk    Level of Risk/Frequency of Follow-up: low (f/u 1 x wk)     Assessment and Plan    Severe malnutrition    Malnutrition in the context of Chronic Illness/Injury    Related to (etiology):  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    Signs and Symptoms (as evidenced by):  Energy Intake: <75% of estimated energy requirement for > 1 month  Muscle Mass Depletion: moderate depletion of interosseous muscle   Weight Loss: 13% x ~ 1 month   Fluid Accumulation: mild    Interventions/Recommendations (treatment strategy):  See RD note from 7/5/18    Nutrition Diagnosis Status:  New             Monitor and Evaluation    Food and Nutrient Intake: energy intake, food and beverage intake  Food and Nutrient Adminstration: diet order  Physical Activity and  Function: nutrition-related ADLs and IADLs  Anthropometric Measurements: weight, weight change  Biochemical Data, Medical Tests and Procedures:  (LBM: 5/31/18)  Nutrition-Focused Physical Findings: overall appearance     Nutrition Follow-Up    RD Follow-up?: Yes

## 2018-07-05 NOTE — PHYSICIAN QUERY
"PT Name: Noreen Mac  MR #: [id number]    Physician Query Form - Heart  Condition Clarification     Madonna Harmon RN CDS    Contact Information: [phone]    This form is a permanent document in the medical record.     Query Date: July 5, 2018    By submitting this query, we are merely seeking further clarification of documentation. Please utilize your independent clinical judgment when addressing the question(s) below.    The medical record contains the following   Indicators     Supporting Clinical Findings Location in Medical Record    BNP     X EF EF 60-65%).    EF 60-65%).    (EF 30-35%).  5/25 2D Echo w/ Color Eliot   6/15 2D Echo w/ Color Eliot    7/2 2D Echo w/ Color Eliot   X Radiology findings New findings suggestive of CHF and pulmonary edema as above.    Focal airspace opacity in the lung bases bilaterally, right more than left, concerning for superimposed pneumonia. 7/1 CXR   X Echo Results - Normal left ventricular systolic function (EF 60-65%).   2 - No wall motion abnormalities.   3 - Mild left atrial enlargement.   4 - Indeterminate LV diastolic function.   5 - Normal right ventricular systolic function     Normal left ventricular systolic function (EF 60-65%).   2 - No wall motion abnormalities.   3 - Moderate left atrial enlargement.   4 - Indeterminate LV diastolic function.   5 - Normal right ventricular systolic function .     Severe left atrial enlargement.   2 - Mild left ventricular enlargement.   3 - Eccentric hypertrophy.   4 - Moderately depressed left ventricular systolic function (EF 30-35%).   5 - Quantitatively measured LV function is 36%.   6 - Wall motion abnormalities.   7 - Impaired LV relaxation, elevated LAP (grade 2 diastolic dysfunction).   8 - Normal right ventricular systolic function .   9 - Pulmonary hypertension. The estimated PA systolic pressure    5/25 2D Echo w/ Color Eliot         6/15 2D Echo w/ Color Eliot            7/2 2D Echo w/ Color Eliot        "Ascites" documented  " "   X "SOB" or "FRIED" documented Positive for shortness of breath (she thinks related to anxiety).    Pt. With c/o SOB with O2 used PRN throughout shift.     7/3 Cardiology Consult          6/30 RN Plan Of Care    "Hypoxia" documented     X Heart Failure documented Also on the differential is acute heart failure with &reduced ejection fraction.    7/3 Cardiology Consult   X "Edema" documented Chest x-ray with pulmonary edema.   7/2 Bone Marrow Transplant PN   X Diuretics/Meds furosemide injection 20 mg  :  Dose 20 mg  :  Intravenous  :  Once    furosemide tablet 20 mg  :  Dose 20 mg  :  Oral  :  Daily 7/1 MAR      7/3-7/5 MAR   X Treatment: Start lisinopril 5mg PO qDaily, toprol 25mg PO qDaily     Discontinue lopressor 25mg PO BID       She is pancytopenic, very fatigued. 2D ECHO showed LVEF of 30%. Cardiology consult requested. Holding Midostaurin as it is associated with CHF.     Stopped Midostaurin (7/3) due to new diagnosis of systolic heart failure EF 35%.      7/3 Cardiology Consult              7/3 Bone MarrowTransplant PN        7/4 7/3 Bone MarrowTransplant PN   X Other:  Chemotherapy induced cardiomyopathy Based off of therapy timeline and most recent echo changes of LV systolic dysfunction and EF reduction on 7/2 as compared to a baseline echo from 6/15, the most likely diagnosis is chemo-induced cardiomyopathy.    cardiomyopathy. Patient was fluid overloaded over the weekend with a CXR that suggested pulmonary edema, indicating likely an issue with cardiac output making cardiomyopathy at the top of the differential.    Fluid overload over the weekend. Chest x-ray with pulmonary edema. Os sats down to 88%, placed on O2 at 2 L NC, now on RA. Received lasix. Net negative 2.7 L today.    7/3 Cardiology Consult                            7/2 Bone Marrow Transplant PN       Provider, please specify diagnosis or diagnoses associated with above clinical findings.                                 [ x ] Acute " Combined Systolic and Diastolic Heart Failure  [  ] Other Type of Heart Failure (please specify type): _________________________  [  ] Heart Failure Ruled Out  [  ] Other (please specify): ___________________________________  [  ] Clinically Undetermined            *American [employer] Association                                                                                                          Please document in your progress notes daily for the duration of treatment until resolved and include in your discharge summary.

## 2018-07-05 NOTE — PT/OT/SLP PROGRESS
Occupational Therapy      Patient Name:  Noreen Mac   MRN:  [id number]  [id number]  Patient seen today for f/u, reports no new issues, continues to ambulate as tolerated, reports no issues with ADL's at this time and no significant decline in function. Educated on POC, notify nsg/MD if any change in status warranting continued therapy. Will discharge OT at this time.    Aimee Joiner, OT  7/5/2018

## 2018-07-05 NOTE — ASSESSMENT & PLAN NOTE
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

## 2018-07-05 NOTE — PROGRESS NOTES
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  She is breathing much better compared to over the weekend.    Hospital Course:   No notes on file    Past Medical History:   Diagnosis Date    Hypertension        Past Surgical History:   Procedure Laterality Date    CHOLECYSTECTOMY      HYSTERECTOMY      TONSILLECTOMY         Review of patient's allergies indicates:   Allergen Reactions    Methotrexate analogues      Elevated Liver Enzyme    Bactrim [sulfamethoxazole-trimethoprim] Nausea And Vomiting and Rash       No current facility-administered medications on file prior to encounter.      No current outpatient prescriptions on file prior to encounter.     Family History     Problem Relation (Age of Onset)    Cancer Mother, Father        Social History Main Topics    Smoking status: Former Smoker     Packs/day: 0.50     Years: 10.00     Types: Cigarettes     Quit date: 3/25/2017    Smokeless tobacco: Not on file    Alcohol use 0.6 oz/week     1 Shots of liquor per week    Drug use: No    Sexual activity: Yes     Partners: Female     Review of Systems   Constitution: Negative for weight gain.   HENT: Negative.    Eyes: Negative.    Cardiovascular: Negative for chest pain, dyspnea on exertion, irregular heartbeat and palpitations.   Respiratory: Negative for cough, shortness of breath (she thinks related to anxiety) and wheezing.    Hematologic/Lymphatic: Negative.    Skin: Negative.    Musculoskeletal: Negative.    Gastrointestinal: Negative.    Genitourinary: Negative.    Neurological: Negative.     Psychiatric/Behavioral: The patient is nervous/anxious.    Allergic/Immunologic: Negative.      Objective:     Vital Signs (Most Recent):  Temp: 99.2 °F (37.3 °C) (07/05/18 0402)  Pulse: 91 (07/05/18 0402)  Resp: 20 (07/05/18 0402)  BP: 121/61 (07/05/18 0402)  SpO2: (!) 92 % (07/05/18 0402) Vital Signs (24h Range):  Temp:  [97.1 °F (36.2 °C)-99.2 °F (37.3 °C)] 99.2 °F (37.3 °C)  Pulse:  [] 91  Resp:  [16-20] 20  SpO2:  [92 %-96 %] 92 %  BP: ()/(55-62) 121/61     Weight: 65.7 kg (144 lb 13.5 oz)  Body mass index is 25.66 kg/m².    SpO2: (!) 92 %  O2 Device (Oxygen Therapy): room air      Intake/Output Summary (Last 24 hours) at 07/05/18 0734  Last data filed at 07/04/18 2003   Gross per 24 hour   Intake              950 ml   Output              900 ml   Net               50 ml       Lines/Drains/Airways     Peripherally Inserted Central Catheter Line                 PICC Double Lumen 05/25/18 1030 right basilic 40 days                Physical Exam   Constitutional: She is oriented to person, place, and time.   HENT:   Head: Normocephalic and atraumatic.   Eyes: Conjunctivae are normal. Pupils are equal, round, and reactive to light.   Neck: Normal range of motion. Neck supple.   Cardiovascular: Normal rate, regular rhythm and normal heart sounds.  Exam reveals no gallop and no friction rub.    No murmur heard.  Pulmonary/Chest: Effort normal and breath sounds normal. She has no wheezes.   Abdominal: Soft. Bowel sounds are normal. She exhibits no distension. There is no tenderness.   Musculoskeletal: Normal range of motion.   Neurological: She is alert and oriented to person, place, and time.   Skin: Skin is warm.       Significant Labs:   CMP     Recent Labs  Lab 07/04/18  0400 07/04/18  1504 07/05/18 0400    136 138   K 3.4* 4.3 3.8    106 107   CO2 24 24 22*    103 95   BUN 6* 8 8   CREATININE 0.6 0.6 0.6   CALCIUM 8.1* 8.8 8.3*   PROT 5.6* 6.1 5.6*   ALBUMIN 2.6* 2.8* 2.5*    BILITOT 0.8 0.8 0.8   ALKPHOS 348* 366* 332*   AST 8* 8* 8*   ALT 5* 5* 5*   ANIONGAP 8 6* 9   ESTGFRAFRICA >60.0 >60.0 >60.0   EGFRNONAA >60.0 >60.0 >60.0   , CBC     Recent Labs  Lab 07/04/18  0400 07/05/18  0400   WBC 0.02* 0.03*   HGB 7.6* 7.3*   HCT 22.5* 21.4*   PLT 18* 10*    and Troponin No results for input(s): TROPONINI in the last 48 hours.    Significant Imaging: Echocardiogram:   2D echo with color flow doppler:   Results for orders placed or performed during the hospital encounter of 05/24/18   2D echo with color flow doppler   Result Value Ref Range    EF 30 (A) 55 - 65    Mitral Valve Regurgitation MODERATE (A)     Diastolic Dysfunction Yes (A)     Aortic Valve Regurgitation TRIVIAL     Est. PA Systolic Pressure 51.16 (A)     Pericardial Effusion TRIVIAL     Mitral Valve Mobility NORMAL     Tricuspid Valve Regurgitation MILD      Assessment and Plan:       Chemotherapy induced cardiomyopathy    Based off of therapy timeline and most recent echo changes of LV systolic dysfunction and EF reduction on 7/2 as compared to a baseline echo from 6/15, the most likely diagnosis is chemo-induced cardiomyopathy.   FLAG JENNIFER is on day 18 (start date 6/15) and a known risk of idrarubicin is cardiomyopathy.  Patient was fluid overloaded over the weekend with a CXR that suggested pulmonary edema, indicating likely an issue with cardiac output making cardiomyopathy at the top of the differential.  Also on the differential is acute heart failure with reduced ejection fraction.     Patient on lisinopril 5mg PO daily, toprol 25mg PO daily, continue to uptitrate medications as tolerated, goal of SBP >90 and HR > 60 bpm    Patient needs cardiology follow up with Dr. Agarwal or Dr. España in 3-4 weeks    Switch lasix 20 mg PO to q48h          We will sign off but feel free to call if any questions arise.  Thank you for the consult.    VTE Risk Mitigation         Ordered     heparin, porcine (PF) 100 unit/mL injection  flush 300 Units  As needed (PRN)      06/15/18 1657     Place MAYLIN hose  Until discontinued      05/25/18 0032     IP VTE HIGH RISK PATIENT  Once      05/25/18 0032          Main Oconnor MD   PGY-1  Cardiology  [facility]

## 2018-07-05 NOTE — ASSESSMENT & PLAN NOTE
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 Ordered routine labs in addition to FLT3, NGS, and AML FISH.  - lo res HLA typing on 5/26/18 and hi res on 5/27/18 done in anticipation of possible need for future transplant   - Pt with two daughters, two full sisters (in their mid 60s, one with brain aneurysm hx, other one without any medical issues), and one full brother (61 y/o healthy).  - ECHO ordered 5/25/18, EF 65%  - PICC line placed 5/25/18   - initially on hydrea, discontinued on 5/28.   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  - started on induction therapy on 5/29/18.  - Day 37 of 7+3.    - allopurinol stopped 6/11  - prophylactic antimicrobials: Acyclovir, Voriconazole  - will need CNS Prophylaxis given her elevated WBC (high risk) on admission after count recovery.  - NPM1 +, CEBPA (-), FLT3 (+).  On Midostaurin 50 mg PO BID until Day 14 (held starting 6/8 to 6/11). Stopped when FLAG JENN[facility]ER re-induction started. Restarted Midostaurin at 25 mg bid on day 8 of FLAG JENNIFER induction (6/22), increase to full dose 50 mg bid (6/26) as improvement in liver enzymes. Stopped 7/3/18 due to abnormal cardiac echo.  - day 14 bone marrow biopsy completed 6/11 showing persistent disease with 15% CD 34 positive blasts  - Day 21 of FLAG-JENNIFER, tolerating without difficulty except nausea/vomiting  - repeat 2D echo on 6/15 with preserved EF of 60%.  However 7/2/18 echo with reduced EF 30-35%  - day 14 restaging bone marrow biopsy done 6/29 without complication, results with no evidence of AML

## 2018-07-05 NOTE — ASSESSMENT & PLAN NOTE
AST and ALT have normalized. Alk phos improved to 332 today.   -   - midostaurin started day 8 at 25 mg bid, monitoring liver enzymes closely and improved. Increasing Midostaurin to 50 mg bid 6/26. Stopped Midostaurin (7/3) due to new diagnosis of systolic heart failure EF 35%.  - 6/22/18 liver US showing mild intrahepatic dilation, otherwise impression of hepatic steatosis.  Will hold off of MRCP at this time.

## 2018-07-05 NOTE — SUBJECTIVE & OBJECTIVE
Past Medical History:   Diagnosis Date    Hypertension        Past Surgical History:   Procedure Laterality Date    CHOLECYSTECTOMY      HYSTERECTOMY      TONSILLECTOMY         Review of patient's allergies indicates:   Allergen Reactions    Methotrexate analogues      Elevated Liver Enzyme    Bactrim [sulfamethoxazole-trimethoprim] Nausea And Vomiting and Rash       No current facility-administered medications on file prior to encounter.      No current outpatient prescriptions on file prior to encounter.     Family History     Problem Relation (Age of Onset)    Cancer Mother, Father        Social History Main Topics    Smoking status: Former Smoker     Packs/day: 0.50     Years: 10.00     Types: Cigarettes     Quit date: 3/25/2017    Smokeless tobacco: Not on file    Alcohol use 0.6 oz/week     1 Shots of liquor per week    Drug use: No    Sexual activity: Yes     Partners: Female     Review of Systems   Constitution: Negative for weight gain.   HENT: Negative.    Eyes: Negative.    Cardiovascular: Negative for chest pain, dyspnea on exertion, irregular heartbeat and palpitations.   Respiratory: Negative for cough, shortness of breath (she thinks related to anxiety) and wheezing.    Hematologic/Lymphatic: Negative.    Skin: Negative.    Musculoskeletal: Negative.    Gastrointestinal: Negative.    Genitourinary: Negative.    Neurological: Negative.    Psychiatric/Behavioral: The patient is nervous/anxious.    Allergic/Immunologic: Negative.      Objective:     Vital Signs (Most Recent):  Temp: 99.2 °F (37.3 °C) (07/05/18 0402)  Pulse: 91 (07/05/18 0402)  Resp: 20 (07/05/18 0402)  BP: 121/61 (07/05/18 0402)  SpO2: (!) 92 % (07/05/18 0402) Vital Signs (24h Range):  Temp:  [97.1 °F (36.2 °C)-99.2 °F (37.3 °C)] 99.2 °F (37.3 °C)  Pulse:  [] 91  Resp:  [16-20] 20  SpO2:  [92 %-96 %] 92 %  BP: ()/(55-62) 121/61     Weight: 65.7 kg (144 lb 13.5 oz)  Body mass index is 25.66 kg/m².    SpO2: (!) 92  %  O2 Device (Oxygen Therapy): room air      Intake/Output Summary (Last 24 hours) at 07/05/18 0734  Last data filed at 07/04/18 2003   Gross per 24 hour   Intake              950 ml   Output              900 ml   Net               50 ml       Lines/Drains/Airways     Peripherally Inserted Central Catheter Line                 PICC Double Lumen 05/25/18 1030 right basilic 40 days                Physical Exam   Constitutional: She is oriented to person, place, and time.   HENT:   Head: Normocephalic and atraumatic.   Eyes: Conjunctivae are normal. Pupils are equal, round, and reactive to light.   Neck: Normal range of motion. Neck supple.   Cardiovascular: Normal rate, regular rhythm and normal heart sounds.  Exam reveals no gallop and no friction rub.    No murmur heard.  Pulmonary/Chest: Effort normal and breath sounds normal. She has no wheezes.   Abdominal: Soft. Bowel sounds are normal. She exhibits no distension. There is no tenderness.   Musculoskeletal: Normal range of motion.   Neurological: She is alert and oriented to person, place, and time.   Skin: Skin is warm.       Significant Labs:   CMP     Recent Labs  Lab 07/04/18  0400 07/04/18  1504 07/05/18  0400    136 138   K 3.4* 4.3 3.8    106 107   CO2 24 24 22*    103 95   BUN 6* 8 8   CREATININE 0.6 0.6 0.6   CALCIUM 8.1* 8.8 8.3*   PROT 5.6* 6.1 5.6*   ALBUMIN 2.6* 2.8* 2.5*   BILITOT 0.8 0.8 0.8   ALKPHOS 348* 366* 332*   AST 8* 8* 8*   ALT 5* 5* 5*   ANIONGAP 8 6* 9   ESTGFRAFRICA >60.0 >60.0 >60.0   EGFRNONAA >60.0 >60.0 >60.0   , CBC     Recent Labs  Lab 07/04/18  0400 07/05/18  0400   WBC 0.02* 0.03*   HGB 7.6* 7.3*   HCT 22.5* 21.4*   PLT 18* 10*    and Troponin No results for input(s): TROPONINI in the last 48 hours.    Significant Imaging: Echocardiogram:   2D echo with color flow doppler:   Results for orders placed or performed during the hospital encounter of 05/24/18   2D echo with color flow doppler   Result Value Ref  Range    EF 30 (A) 55 - 65    Mitral Valve Regurgitation MODERATE (A)     Diastolic Dysfunction Yes (A)     Aortic Valve Regurgitation TRIVIAL     Est. PA Systolic Pressure 51.16 (A)     Pericardial Effusion TRIVIAL     Mitral Valve Mobility NORMAL     Tricuspid Valve Regurgitation MILD

## 2018-07-05 NOTE — ASSESSMENT & PLAN NOTE
Based off of therapy timeline and most recent echo changes of LV systolic dysfunction and EF reduction on 7/2 as compared to a baseline echo from 6/15, the most likely diagnosis is chemo-induced cardiomyopathy.   FLAG JENNIFER is on day 18 (start date 6/15) and a known risk of idrarubicin is cardiomyopathy.  Patient was fluid overloaded over the weekend with a CXR that suggested pulmonary edema, indicating likely an issue with cardiac output making cardiomyopathy at the top of the differential.  Also on the differential is acute heart failure with reduced ejection fraction.     Patient on lisinopril 5mg PO daily, toprol 25mg PO daily, continue to uptitrate medications as tolerated, goal of SBP >90 and HR > 60 bpm    Patient needs cardiology follow up with Dr. Agarwal or Dr. España in 3-4 weeks    Switch lasix 20 mg PO to q48h

## 2018-07-05 NOTE — SUBJECTIVE & OBJECTIVE
"  Subjective:     Interval History:   Day 38 of 7+3 ane Day 21 of FLAG JENNIFER. No CP or SOB, cardiology following. On RA. All meds changed to po, denies nausea or vomiting and no diarrhea. VSS, afebrile. No complaints this am and states "she is ready to go home"    Objective:     Vital Signs (Most Recent):  Temp: 97.8 °F (36.6 °C) (07/05/18 1138)  Pulse: 80 (07/05/18 1138)  Resp: 19 (07/05/18 1138)  BP: 132/68 (07/05/18 1138)  SpO2: 96 % (07/05/18 1138) Vital Signs (24h Range):  Temp:  [97.1 °F (36.2 °C)-99.2 °F (37.3 °C)] 97.8 °F (36.6 °C)  Pulse:  [] 80  Resp:  [17-20] 19  SpO2:  [92 %-96 %] 96 %  BP: ()/(55-68) 132/68     Weight: 65.7 kg (144 lb 13.5 oz)  Body mass index is 25.66 kg/m².  Body surface area is 1.71 meters squared.      Intake/Output - Last 3 Shifts       07/03 0700 - 07/04 0659 07/04 0700 - 07/05 0659 07/05 0700 - 07/06 0659    P.O. 720 400 400    I.V. (mL/kg)  100 (1.5)     IV Piggyback 500 750 50    Total Intake(mL/kg) 1220 (18.2) 1250 (19) 450 (6.8)    Urine (mL/kg/hr) 1700 (1.1) 900 (0.6) 400 (0.8)    Emesis/NG output  0 (0)     Total Output 1700 900 400    Net -480 +350 +50           Urine Occurrence 2 x  1 x    Stool Occurrence 2 x  1 x          Physical Exam   Constitutional: She is oriented to person, place, and time. She appears well-developed and well-nourished. No distress.   HENT:   Mouth/Throat: Oropharynx is clear and moist. No oropharyngeal exudate or posterior oropharyngeal erythema.   Eyes: Conjunctivae are normal. Pupils are equal, round, and reactive to light. No scleral icterus.   Neck: Normal range of motion. Neck supple. No thyromegaly present.   Cardiovascular: Normal rate, regular rhythm, normal heart sounds and intact distal pulses.    Pulmonary/Chest: Effort normal and breath sounds normal. No respiratory distress.   Abdominal: Soft. Bowel sounds are normal. She exhibits no distension. There is no splenomegaly or hepatomegaly. There is no tenderness. "   Musculoskeletal: Normal range of motion. She exhibits no edema or tenderness.   Neurological: She is alert and oriented to person, place, and time. No cranial nerve deficit. Coordination normal.   Skin: No rash noted. No cyanosis. No pallor. Nails show no clubbing.   PICC intact R arm no redness or drainage     Psychiatric: She has a normal mood and affect. Thought content normal.   Vitals reviewed.      Significant Labs:   CBC:   Recent Labs  Lab 07/04/18  0400 07/05/18  0400   WBC 0.02* 0.03*   HGB 7.6* 7.3*   HCT 22.5* 21.4*   PLT 18* 10*   , CMP:   Recent Labs  Lab 07/04/18  0400 07/04/18  1504 07/05/18  0400    136 138   K 3.4* 4.3 3.8    106 107   CO2 24 24 22*    103 95   BUN 6* 8 8   CREATININE 0.6 0.6 0.6   CALCIUM 8.1* 8.8 8.3*   PROT 5.6* 6.1 5.6*   ALBUMIN 2.6* 2.8* 2.5*   BILITOT 0.8 0.8 0.8   ALKPHOS 348* 366* 332*   AST 8* 8* 8*   ALT 5* 5* 5*   ANIONGAP 8 6* 9   EGFRNONAA >60.0 >60.0 >60.0   , Coagulation: No results for input(s): PT, INR, APTT in the last 48 hours., LDH: No results for input(s): LDHCSF, BFSOURCE in the last 48 hours. and Uric Acid No results for input(s): URICACID in the last 48 hours.    Diagnostic Results:  I have reviewed all pertinent imaging results/findings within the past 24 hours.

## 2018-07-06 LAB
ABO + RH BLD: NORMAL
ALBUMIN SERPL BCP-MCNC: 2.6 G/DL
ALP SERPL-CCNC: 318 U/L
ALT SERPL W/O P-5'-P-CCNC: <5 U/L
ANION GAP SERPL CALC-SCNC: 7 MMOL/L
ANISOCYTOSIS BLD QL SMEAR: SLIGHT
AST SERPL-CCNC: 8 U/L
BASOPHILS # BLD AUTO: 0 K/UL
BASOPHILS NFR BLD: 0 %
BILIRUB SERPL-MCNC: 0.8 MG/DL
BLD GP AB SCN CELLS X3 SERPL QL: NORMAL
BLD PROD TYP BPU: NORMAL
BLOOD UNIT EXPIRATION DATE: NORMAL
BLOOD UNIT TYPE CODE: 6200
BLOOD UNIT TYPE: NORMAL
BUN SERPL-MCNC: 8 MG/DL
CALCIUM SERPL-MCNC: 8.5 MG/DL
CHLORIDE SERPL-SCNC: 104 MMOL/L
CO2 SERPL-SCNC: 25 MMOL/L
CODING SYSTEM: NORMAL
CREAT SERPL-MCNC: 0.6 MG/DL
DIFFERENTIAL METHOD: ABNORMAL
DISPENSE STATUS: NORMAL
EOSINOPHIL # BLD AUTO: 0 K/UL
EOSINOPHIL NFR BLD: 0 %
ERYTHROCYTE [DISTWIDTH] IN BLOOD BY AUTOMATED COUNT: 13.2 %
EST. GFR  (AFRICAN AMERICAN): >60 ML/MIN/1.73 M^2
EST. GFR  (NON AFRICAN AMERICAN): >60 ML/MIN/1.73 M^2
GLUCOSE SERPL-MCNC: 97 MG/DL
HCT VFR BLD AUTO: 21.2 %
HGB BLD-MCNC: 7.2 G/DL
IMM GRANULOCYTES # BLD AUTO: 0 K/UL
IMM GRANULOCYTES NFR BLD AUTO: 0 %
LYMPHOCYTES # BLD AUTO: 0 K/UL
LYMPHOCYTES NFR BLD: 50 %
MAGNESIUM SERPL-MCNC: 1.6 MG/DL
MCH RBC QN AUTO: 30.3 PG
MCHC RBC AUTO-ENTMCNC: 34 G/DL
MCV RBC AUTO: 89 FL
MONOCYTES # BLD AUTO: 0 K/UL
MONOCYTES NFR BLD: 0 %
NEUTROPHILS # BLD AUTO: 0 K/UL
NEUTROPHILS NFR BLD: 50 %
NRBC BLD-RTO: 0 /100 WBC
NUM UNITS TRANS WBC-POOR PLATPHERESIS: NORMAL
PHOSPHATE SERPL-MCNC: 3 MG/DL
PLATELET # BLD AUTO: 6 K/UL
PLATELET BLD QL SMEAR: ABNORMAL
PMV BLD AUTO: 11.8 FL
POTASSIUM SERPL-SCNC: 3.7 MMOL/L
PROT SERPL-MCNC: 5.8 G/DL
RBC # BLD AUTO: 2.38 M/UL
SODIUM SERPL-SCNC: 136 MMOL/L
WBC # BLD AUTO: 0.02 K/UL

## 2018-07-06 PROCEDURE — 84100 ASSAY OF PHOSPHORUS: CPT

## 2018-07-06 PROCEDURE — 25000003 PHARM REV CODE 250: Performed by: STUDENT IN AN ORGANIZED HEALTH CARE EDUCATION/TRAINING PROGRAM

## 2018-07-06 PROCEDURE — P9037 PLATE PHERES LEUKOREDU IRRAD: HCPCS

## 2018-07-06 PROCEDURE — 63600175 PHARM REV CODE 636 W HCPCS: Performed by: INTERNAL MEDICINE

## 2018-07-06 PROCEDURE — 99233 SBSQ HOSP IP/OBS HIGH 50: CPT | Mod: GC,,, | Performed by: INTERNAL MEDICINE

## 2018-07-06 PROCEDURE — 25000003 PHARM REV CODE 250: Performed by: INTERNAL MEDICINE

## 2018-07-06 PROCEDURE — 20600001 HC STEP DOWN PRIVATE ROOM

## 2018-07-06 PROCEDURE — 85025 COMPLETE CBC W/AUTO DIFF WBC: CPT

## 2018-07-06 PROCEDURE — 86850 RBC ANTIBODY SCREEN: CPT

## 2018-07-06 PROCEDURE — 25000003 PHARM REV CODE 250: Performed by: NURSE PRACTITIONER

## 2018-07-06 PROCEDURE — 80053 COMPREHEN METABOLIC PANEL: CPT

## 2018-07-06 PROCEDURE — 83735 ASSAY OF MAGNESIUM: CPT

## 2018-07-06 PROCEDURE — 25000003 PHARM REV CODE 250: Performed by: HOSPITALIST

## 2018-07-06 PROCEDURE — 63600175 PHARM REV CODE 636 W HCPCS: Mod: JG | Performed by: INTERNAL MEDICINE

## 2018-07-06 PROCEDURE — 86644 CMV ANTIBODY: CPT

## 2018-07-06 PROCEDURE — 63600175 PHARM REV CODE 636 W HCPCS: Performed by: NURSE PRACTITIONER

## 2018-07-06 PROCEDURE — A4216 STERILE WATER/SALINE, 10 ML: HCPCS | Performed by: INTERNAL MEDICINE

## 2018-07-06 RX ORDER — POTASSIUM CHLORIDE 20 MEQ/1
40 TABLET, EXTENDED RELEASE ORAL ONCE
Status: DISCONTINUED | OUTPATIENT
Start: 2018-07-06 | End: 2018-07-06

## 2018-07-06 RX ORDER — POTASSIUM CHLORIDE 750 MG/1
40 CAPSULE, EXTENDED RELEASE ORAL ONCE
Status: COMPLETED | OUTPATIENT
Start: 2018-07-06 | End: 2018-07-06

## 2018-07-06 RX ORDER — PROCHLORPERAZINE EDISYLATE 5 MG/ML
10 INJECTION INTRAMUSCULAR; INTRAVENOUS EVERY 6 HOURS PRN
Status: DISCONTINUED | OUTPATIENT
Start: 2018-07-06 | End: 2018-07-13 | Stop reason: HOSPADM

## 2018-07-06 RX ORDER — ACETAMINOPHEN 325 MG/1
650 TABLET ORAL
Status: COMPLETED | OUTPATIENT
Start: 2018-07-06 | End: 2018-07-06

## 2018-07-06 RX ORDER — HYDROCODONE BITARTRATE AND ACETAMINOPHEN 500; 5 MG/1; MG/1
TABLET ORAL
Status: DISCONTINUED | OUTPATIENT
Start: 2018-07-06 | End: 2018-07-09

## 2018-07-06 RX ORDER — DIPHENHYDRAMINE HCL 25 MG
25 CAPSULE ORAL
Status: COMPLETED | OUTPATIENT
Start: 2018-07-06 | End: 2018-07-06

## 2018-07-06 RX ADMIN — VITAMIN D, TAB 1000IU (100/BT) 5000 UNITS: 25 TAB at 09:07

## 2018-07-06 RX ADMIN — ALPRAZOLAM 0.25 MG: 0.25 TABLET ORAL at 07:07

## 2018-07-06 RX ADMIN — ALPRAZOLAM 0.25 MG: 0.25 TABLET ORAL at 08:07

## 2018-07-06 RX ADMIN — METOPROLOL SUCCINATE 25 MG: 25 TABLET, EXTENDED RELEASE ORAL at 08:07

## 2018-07-06 RX ADMIN — CEFPODOXIME PROXETIL 200 MG: 200 TABLET, FILM COATED ORAL at 08:07

## 2018-07-06 RX ADMIN — ACYCLOVIR 400 MG: 200 CAPSULE ORAL at 09:07

## 2018-07-06 RX ADMIN — FUROSEMIDE 20 MG: 20 TABLET ORAL at 08:07

## 2018-07-06 RX ADMIN — VORICONAZOLE 200 MG: 200 TABLET ORAL at 08:07

## 2018-07-06 RX ADMIN — VORICONAZOLE 200 MG: 200 TABLET ORAL at 09:07

## 2018-07-06 RX ADMIN — CEFPODOXIME PROXETIL 200 MG: 200 TABLET, FILM COATED ORAL at 09:07

## 2018-07-06 RX ADMIN — LISINOPRIL 5 MG: 2.5 TABLET ORAL at 08:07

## 2018-07-06 RX ADMIN — PROCHLORPERAZINE EDISYLATE 10 MG: 5 INJECTION INTRAMUSCULAR; INTRAVENOUS at 05:07

## 2018-07-06 RX ADMIN — POTASSIUM CHLORIDE 40 MEQ: 750 CAPSULE, EXTENDED RELEASE ORAL at 11:07

## 2018-07-06 RX ADMIN — Medication 10 ML: at 11:07

## 2018-07-06 RX ADMIN — DIPHENHYDRAMINE HYDROCHLORIDE 25 MG: 25 CAPSULE ORAL at 06:07

## 2018-07-06 RX ADMIN — ACYCLOVIR 400 MG: 200 CAPSULE ORAL at 08:07

## 2018-07-06 RX ADMIN — ESCITALOPRAM 10 MG: 5 TABLET, FILM COATED ORAL at 08:07

## 2018-07-06 RX ADMIN — ACETAMINOPHEN 650 MG: 325 TABLET ORAL at 06:07

## 2018-07-06 RX ADMIN — PANTOPRAZOLE SODIUM 40 MG: 40 TABLET, DELAYED RELEASE ORAL at 08:07

## 2018-07-06 RX ADMIN — MAGNESIUM SULFATE IN WATER 2 G: 40 INJECTION, SOLUTION INTRAVENOUS at 06:07

## 2018-07-06 RX ADMIN — FILGRASTIM 480 MCG: 480 INJECTION, SOLUTION INTRAVENOUS; SUBCUTANEOUS at 06:07

## 2018-07-06 NOTE — ASSESSMENT & PLAN NOTE
- secondary to chemotherapy.   - WBC 0.02 and ANC 0, on Neupogen per FLAG JENNIFER protocol  - hgb 7.2 and platelet count 6,000 today  - tranfuse if Hb < 7 g/dL and Plt < 10,000/mm3 or active bleed.  - transfused 1 unit of platelets today

## 2018-07-06 NOTE — SUBJECTIVE & OBJECTIVE
Subjective:     Interval History: Day 22 of FLAG JENNIFER. Continues to tolerate po meds with no nausea. Afebrile. Awaiting count recovery for discharge.    Objective:     Vital Signs (Most Recent):  Temp: 98.1 °F (36.7 °C) (07/06/18 1536)  Pulse: 91 (07/06/18 1536)  Resp: 18 (07/06/18 1536)  BP: (!) 144/72 (07/06/18 1536)  SpO2: (!) 94 % (07/06/18 1536) Vital Signs (24h Range):  Temp:  [97.9 °F (36.6 °C)-98.6 °F (37 °C)] 98.1 °F (36.7 °C)  Pulse:  [74-99] 91  Resp:  [16-20] 18  SpO2:  [94 %-96 %] 94 %  BP: (112-144)/(57-72) 144/72     Weight: 63.6 kg (140 lb 3.4 oz)  Body mass index is 24.84 kg/m².  Body surface area is 1.68 meters squared.      Intake/Output - Last 3 Shifts       07/04 0700 - 07/05 0659 07/05 0700 - 07/06 0659 07/06 0700 - 07/07 0659    P.O. 400 820 780    I.V. (mL/kg) 100 (1.5)  50 (0.8)    Blood   492.8    IV Piggyback 750 50     Total Intake(mL/kg) 1250 (19) 870 (13.7) 1322.8 (20.8)    Urine (mL/kg/hr) 900 (0.6) 1250 (0.8) 250 (0.4)    Emesis/NG output 0 (0)      Total Output 900 1250 250    Net +350 -380 +1072.8           Urine Occurrence  1 x 1 x    Stool Occurrence  1 x 1 x          Physical Exam   Constitutional: She is oriented to person, place, and time. She appears well-developed and well-nourished. No distress.   HENT:   Mouth/Throat: Oropharynx is clear and moist. No oropharyngeal exudate or posterior oropharyngeal erythema.   Eyes: Conjunctivae are normal. Pupils are equal, round, and reactive to light. No scleral icterus.   Neck: Normal range of motion. Neck supple. No thyromegaly present.   Cardiovascular: Normal rate, regular rhythm, normal heart sounds and intact distal pulses.    Pulmonary/Chest: Effort normal and breath sounds normal. No respiratory distress.   Abdominal: Soft. Bowel sounds are normal. She exhibits no distension. There is no splenomegaly or hepatomegaly. There is no tenderness.   Musculoskeletal: Normal range of motion. She exhibits no edema or tenderness.    Neurological: She is alert and oriented to person, place, and time. No cranial nerve deficit. Coordination normal.   Skin: No rash noted. No cyanosis. No pallor. Nails show no clubbing.   PICC intact R arm no redness or drainage     Psychiatric: She has a normal mood and affect. Thought content normal.   Vitals reviewed.      Significant Labs:   CBC:   Recent Labs  Lab 07/05/18  0400 07/06/18  0400   WBC 0.03* 0.02*   HGB 7.3* 7.2*   HCT 21.4* 21.2*   PLT 10* 6*   , CMP:   Recent Labs  Lab 07/05/18  0400 07/05/18  1512 07/06/18  0400    135* 136   K 3.8 3.6 3.7    103 104   CO2 22* 26 25   GLU 95 106 97   BUN 8 9 8   CREATININE 0.6 0.6 0.6   CALCIUM 8.3* 8.9 8.5*   PROT 5.6* 5.9* 5.8*   ALBUMIN 2.5* 2.7* 2.6*   BILITOT 0.8 0.8 0.8   ALKPHOS 332* 329* 318*   AST 8* 7* 8*   ALT 5* 5* <5*   ANIONGAP 9 6* 7*   EGFRNONAA >60.0 >60.0 >60.0   , Coagulation: No results for input(s): PT, INR, APTT in the last 48 hours., LDH: No results for input(s): LDHCSF, BFSOURCE in the last 48 hours. and Uric Acid No results for input(s): URICACID in the last 48 hours.    Diagnostic Results:  None

## 2018-07-06 NOTE — PLAN OF CARE
Problem: Patient Care Overview  Goal: Plan of Care Review  Outcome: Ongoing (interventions implemented as appropriate)  Patient remains free from falls and injury this shift. Bed in low, locked position with call bell in reach. Patient encouraged to call for assistance when getting out of bed. Patient verbalized understanding. All belongings within reach. Afebrile. Tolerating meals and PO meds with no c/o nausea. Compazine changed to as needed. Xanax given this AM for anxiety. Potassium replacements given as ordered. Neutropenic and thrombocytopenic precautions maintained. Received 1 unit of platelets at shift change with no complications. Discharge pending count recovery. will continue to monitor.

## 2018-07-06 NOTE — ASSESSMENT & PLAN NOTE
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 Ordered routine labs in addition to FLT3, NGS, and AML FISH.  - lo res HLA typing on 5/26/18 and hi res on 5/27/18 done in anticipation of possible need for future transplant   - Pt with two daughters, two full sisters (in their mid 60s, one with brain aneurysm hx, other one without any medical issues), and one full brother (61 y/o healthy).  - ECHO ordered 5/25/18, EF 65%  - PICC line placed 5/25/18   - initially on hydrea, discontinued on 5/28.   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  - started on induction therapy on 5/29/18.  - Day 38 of 7+3.    - allopurinol stopped 6/11  - prophylactic antimicrobials: Acyclovir, Voriconazole  - will need CNS Prophylaxis given her elevated WBC (high risk) on admission after count recovery.  - NPM1 +, CEBPA (-), FLT3 (+).  On Midostaurin 50 mg PO BID until Day 14 (held starting 6/8 to 6/11). Stopped when FLAG JENN[facility]ER re-induction started. Restarted Midostaurin at 25 mg bid on day 8 of FLAG JENN[facility]ER induction (6/22), increase to full dose 50 mg bid (6/26) as improvement in liver enzymes. Stopped 7/3/18 due to abnormal cardiac echo.  - day 14 bone marrow biopsy completed 6/11 showing persistent disease with 15% CD 34 positive blasts  - Day 22 of FLAG-JENNIFER, tolerating without difficulty except nausea/vomiting  - repeat 2D echo on 6/15 with preserved EF of 60%.  However 7/2/18 echo with reduced EF 30-35%  - day 14 restaging bone marrow biopsy done 6/29 without complication, results with no evidence of AML, LFT 3 negative, will need repeat marrow at count recovery  - awaiting count recovery for safe discharge home

## 2018-07-06 NOTE — ASSESSMENT & PLAN NOTE
AST and ALT have normalized. Alk phos improved to 318 today.   -   - midostaurin started day 8 at 25 mg bid, monitoring liver enzymes closely and improved. Increasing Midostaurin to 50 mg bid 6/26. Stopped Midostaurin (7/3) due to new diagnosis of systolic heart failure EF 35%.  - 6/22/18 liver US showing mild intrahepatic dilation, otherwise impression of hepatic steatosis.  Will hold off of MRCP at this time.

## 2018-07-06 NOTE — PLAN OF CARE
Problem: Patient Care Overview  Goal: Plan of Care Review  Outcome: Ongoing (interventions implemented as appropriate)  Plan of care reviewed with the patient at the beginning of the shift. Pt admitted for newly diagnosed AML. She is s/p 7&3 and Day 22 of FLAG-JENNIFER. She is awaiting count recovery. Nausea and diarrhea have improved. Anxiety and nausea managed with xanax which was given once. No emesis or bm tonight. Nausea managed scheduled compazine. Small bland meals encouraged. Pt reports better PO intake. She does still have some overall fatigue. She is up independently. Fall precautions maintained. Pt remained free from falls and injury this shift. Bed locked in lowest position, side rails up x2, call light within reach. Instructed pt to call for assistance as needed. Vitals stable. Pt afebrile. Neutropenic precautions maintained. No acute issues overnight. Will continue to monitor.

## 2018-07-06 NOTE — PROGRESS NOTES
[facility]  Hematology  Bone Marrow Transplant  Progress Note    Patient Name: Noreen Mac  Admission Date: 5/24/2018  Hospital Length of Stay: 43 days  Code Status: Full Code    Subjective:     Interval History: Day 22 of FLAG JENNIFER. Continues to tolerate po meds with no nausea. Afebrile. Awaiting count recovery for discharge.    Objective:     Vital Signs (Most Recent):  Temp: 98.1 °F (36.7 °C) (07/06/18 1536)  Pulse: 91 (07/06/18 1536)  Resp: 18 (07/06/18 1536)  BP: (!) 144/72 (07/06/18 1536)  SpO2: (!) 94 % (07/06/18 1536) Vital Signs (24h Range):  Temp:  [97.9 °F (36.6 °C)-98.6 °F (37 °C)] 98.1 °F (36.7 °C)  Pulse:  [74-99] 91  Resp:  [16-20] 18  SpO2:  [94 %-96 %] 94 %  BP: (112-144)/(57-72) 144/72     Weight: 63.6 kg (140 lb 3.4 oz)  Body mass index is 24.84 kg/m².  Body surface area is 1.68 meters squared.      Intake/Output - Last 3 Shifts       07/04 0700 - 07/05 0659 07/05 0700 - 07/06 0659 07/06 0700 - 07/07 0659    P.O. 400 820 780    I.V. (mL/kg) 100 (1.5)  50 (0.8)    Blood   492.8    IV Piggyback 750 50     Total Intake(mL/kg) 1250 (19) 870 (13.7) 1322.8 (20.8)    Urine (mL/kg/hr) 900 (0.6) 1250 (0.8) 250 (0.4)    Emesis/NG output 0 (0)      Total Output 900 1250 250    Net +350 -380 +1072.8           Urine Occurrence  1 x 1 x    Stool Occurrence  1 x 1 x          Physical Exam   Constitutional: She is oriented to person, place, and time. She appears well-developed and well-nourished. No distress.   HENT:   Mouth/Throat: Oropharynx is clear and moist. No oropharyngeal exudate or posterior oropharyngeal erythema.   Eyes: Conjunctivae are normal. Pupils are equal, round, and reactive to light. No scleral icterus.   Neck: Normal range of motion. Neck supple. No thyromegaly present.   Cardiovascular: Normal rate, regular rhythm, normal heart sounds and intact distal pulses.    Pulmonary/Chest: Effort normal and breath sounds normal. No respiratory distress.   Abdominal: Soft. Bowel  sounds are normal. She exhibits no distension. There is no splenomegaly or hepatomegaly. There is no tenderness.   Musculoskeletal: Normal range of motion. She exhibits no edema or tenderness.   Neurological: She is alert and oriented to person, place, and time. No cranial nerve deficit. Coordination normal.   Skin: No rash noted. No cyanosis. No pallor. Nails show no clubbing.   PICC intact R arm no redness or drainage     Psychiatric: She has a normal mood and affect. Thought content normal.   Vitals reviewed.      Significant Labs:   CBC:   Recent Labs  Lab 07/05/18  0400 07/06/18  0400   WBC 0.03* 0.02*   HGB 7.3* 7.2*   HCT 21.4* 21.2*   PLT 10* 6*   , CMP:   Recent Labs  Lab 07/05/18  0400 07/05/18  1512 07/06/18  0400    135* 136   K 3.8 3.6 3.7    103 104   CO2 22* 26 25   GLU 95 106 97   BUN 8 9 8   CREATININE 0.6 0.6 0.6   CALCIUM 8.3* 8.9 8.5*   PROT 5.6* 5.9* 5.8*   ALBUMIN 2.5* 2.7* 2.6*   BILITOT 0.8 0.8 0.8   ALKPHOS 332* 329* 318*   AST 8* 7* 8*   ALT 5* 5* <5*   ANIONGAP 9 6* 7*   EGFRNONAA >60.0 >60.0 >60.0   , Coagulation: No results for input(s): PT, INR, APTT in the last 48 hours., LDH: No results for input(s): LDHCSF, BFSOURCE in the last 48 hours. and Uric Acid No results for input(s): URICACID in the last 48 hours.    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 Ordered routine labs in addition to FLT3, NGS, and AML FISH.  - lo res HLA typing on 5/26/18 and hi res on 5/27/18 done in anticipation of possible need for future transplant   - Pt with two daughters, two full sisters (in their mid 60s, one with brain aneurysm hx, other one without any medical issues), and one full brother (61 y/o healthy).  - ECHO ordered 5/25/18,  EF 65%  - PICC line placed 5/25/18   - initially on hydrea, discontinued on 5/28.   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  - started on induction therapy on 5/29/18.  - Day 38 of 7+3.    - allopurinol stopped 6/11  - prophylactic antimicrobials: Acyclovir, Voriconazole  - will need CNS Prophylaxis given her elevated WBC (high risk) on admission after count recovery.  - NPM1 +, CEBPA (-), FLT3 (+).  On Midostaurin 50 mg PO BID until Day 14 (held starting 6/8 to 6/11). Stopped when FLAG JENNIFER re-induction started. Restarted Midostaurin at 25 mg bid on day 8 of FLAG JENNIFER induction (6/22), increase to full dose 50 mg bid (6/26) as improvement in liver enzymes. Stopped 7/3/18 due to abnormal cardiac echo.  - day 14 bone marrow biopsy completed 6/11 showing persistent disease with 15% CD 34 positive blasts  - Day 22 of FLAG-JENNIFER, tolerating without difficulty except nausea/vomiting  - repeat 2D echo on 6/15 with preserved EF of 60%.  However 7/2/18 echo with reduced EF 30-35%  - day 14 restaging bone marrow biopsy done 6/29 without complication, results with no evidence of AML, LFT 3 negative, will need repeat marrow at count recovery  - awaiting count recovery for safe discharge home          Staphylococcus epidermidis bacteremia    - blood cultures from 6/11 at 0430 with gram + cocci, staph epidermis sensitive for Vancomycin, CXR and UA unremarkable.   - surveillance blood cultures repeated 6/13 NGTD  - de-escalated aztreonam back to prophylactic cipro 6/16  - ID consulted on 6/19, rec continue Vanc for 2 weeks post last negative culture (EOT 6/27)  - BC from 6/11 with gram negative rods, leptotrichia goodfellowii  - completed Flagyl po x 5 days (EOT 6/24)  - ID has signed off  - patient remains afebrile        Pancytopenia    - secondary to chemotherapy.   - WBC 0.02 and ANC 0, on Neupogen per FLAG JENNIFER protocol  - hgb 7.2 and platelet count 6,000 today  - tranfuse if Hb < 7 g/dL and Plt < 10,000/mm3 or  active bleed.  - transfused 1 unit of platelets today                Abnormal liver enzymes    AST and ALT have normalized. Alk phos improved to 318 today.   -   - midostaurin started day 8 at 25 mg bid, monitoring liver enzymes closely and improved. Increasing Midostaurin to 50 mg bid 6/26. Stopped Midostaurin (7/3) due to new diagnosis of systolic heart failure EF 35%.  - 6/22/18 liver US showing mild intrahepatic dilation, otherwise impression of hepatic steatosis.  Will hold off of MRCP at this time.          Chemotherapy induced cardiomyopathy    - sob and O2 requirements--resolved  - chest x-ray with pulmonary edema  - 2 D echo from 7/2 with EF of 30-35%, pulmonary HTN and severe L atrial enlargement  - cardiology following, appreciate recs  - Lopressor dc'd and lisinopril and Toprol XL and po lasix started on 7/3  - cardiology follow-up as outpatient in 3 months        Hypophosphatemia    - phos wnl at 3.0 this am  - replace IV per electrolyte protocol        Hypokalemia    - K at 3.7 today, will give 40 meq of potassium to keep around 4        CINV (chemotherapy-induced nausea and vomiting)    - completed olanzapine daily x 6 days  - prn Zofran and Ativan, Zofran stopped due to QTc prolongation  - improved with scheduled Compazine IV q6hr, changed back to PRN today  - no recent emesis  - meds converted back to po and tolerating without difficulty        Hypomagnesemia    - Mag 1.6 today  - replace IV per electrolyte protocol          MCTD (mixed connective tissue disease)    - MCTD-NOS  - previously on MTX and Prednisone.   - off therapy now and otherwise doing well.   - can follow up outpatient with her Rheumatologist upon discharge.         Pulmonary nodule    - CXR suggestive of 7 mm slightly irregular shaped radiodensity projected over the left upper lung zone which could represent a calcified granuloma but remains indeterminate.  - CT Chest on 5/28: Multiple scattered noncalcified pulmonary  granulomas, the largest measuring 8 mm in the lateral segment of the right middle lobe.   - repeat non-contrast chest CT at 6-12 months         Insomnia    - PRN nightly Xanax.         HTN (hypertension)    - history of SVT and reports intermittent palpitations at times.  - EKG on 5/28 - NSR.  - increased metoprolol XL to 25 mg QD,she feels better with this dose.  - metoprolol tartrate changed on 6/13 to 25 mg bid (hold for SBP <100) due to hypotension  - cardiology consulted for abnormal echo and lopressor stopped and lisinopril and Toprol XL started  --HTN well controlled        Osteoporosis    - takes prolia once every 6 months, last given mid April 2018   - no acute issue         Depression    - no acute issue   - continue home Escitalopram             VTE Risk Mitigation         Ordered     heparin, porcine (PF) 100 unit/mL injection flush 300 Units  As needed (PRN)      06/15/18 1657     Place MAYLIN hose  Until discontinued      05/25/18 0032     IP VTE HIGH RISK PATIENT  Once      05/25/18 0032          Disposition: pending count recovery    Sweetie Lepe NP  Bone Marrow Transplant  [facility]

## 2018-07-06 NOTE — PROGRESS NOTES
SW met with pt at bedside to provide support. SW will continue to follow.    Radha Urias [facility]  Oncology Social Worker  Phone: [phone]

## 2018-07-06 NOTE — ASSESSMENT & PLAN NOTE
- completed olanzapine daily x 6 days  - prn Zofran and Ativan, Zofran stopped due to QTc prolongation  - improved with scheduled Compazine IV q6hr, changed back to PRN today  - no recent emesis  - meds converted back to po and tolerating without difficulty

## 2018-07-07 LAB
ALBUMIN SERPL BCP-MCNC: 2.8 G/DL
ALP SERPL-CCNC: 315 U/L
ALT SERPL W/O P-5'-P-CCNC: 6 U/L
ANION GAP SERPL CALC-SCNC: 6 MMOL/L
AST SERPL-CCNC: 8 U/L
BASOPHILS # BLD AUTO: 0 K/UL
BASOPHILS NFR BLD: 0 %
BILIRUB SERPL-MCNC: 0.8 MG/DL
BUN SERPL-MCNC: 8 MG/DL
CALCIUM SERPL-MCNC: 9.1 MG/DL
CHLORIDE SERPL-SCNC: 105 MMOL/L
CO2 SERPL-SCNC: 25 MMOL/L
CREAT SERPL-MCNC: 0.6 MG/DL
DIFFERENTIAL METHOD: ABNORMAL
EOSINOPHIL # BLD AUTO: 0 K/UL
EOSINOPHIL NFR BLD: 0 %
ERYTHROCYTE [DISTWIDTH] IN BLOOD BY AUTOMATED COUNT: 13 %
EST. GFR  (AFRICAN AMERICAN): >60 ML/MIN/1.73 M^2
EST. GFR  (NON AFRICAN AMERICAN): >60 ML/MIN/1.73 M^2
GLUCOSE SERPL-MCNC: 96 MG/DL
HCT VFR BLD AUTO: 22.5 %
HGB BLD-MCNC: 7.6 G/DL
IMM GRANULOCYTES # BLD AUTO: 0 K/UL
IMM GRANULOCYTES NFR BLD AUTO: 0 %
LYMPHOCYTES # BLD AUTO: 0 K/UL
LYMPHOCYTES NFR BLD: 40 %
MAGNESIUM SERPL-MCNC: 1.4 MG/DL
MCH RBC QN AUTO: 30.2 PG
MCHC RBC AUTO-ENTMCNC: 33.8 G/DL
MCV RBC AUTO: 89 FL
MONOCYTES # BLD AUTO: 0 K/UL
MONOCYTES NFR BLD: 0 %
NEUTROPHILS # BLD AUTO: 0 K/UL
NEUTROPHILS NFR BLD: 60 %
NRBC BLD-RTO: 0 /100 WBC
PHOSPHATE SERPL-MCNC: 2.8 MG/DL
PLATELET # BLD AUTO: 23 K/UL
PMV BLD AUTO: 9.9 FL
POTASSIUM SERPL-SCNC: 4.1 MMOL/L
PROT SERPL-MCNC: 6.2 G/DL
RBC # BLD AUTO: 2.52 M/UL
SODIUM SERPL-SCNC: 136 MMOL/L
WBC # BLD AUTO: 0.05 K/UL

## 2018-07-07 PROCEDURE — 84100 ASSAY OF PHOSPHORUS: CPT

## 2018-07-07 PROCEDURE — 25000003 PHARM REV CODE 250: Performed by: HOSPITALIST

## 2018-07-07 PROCEDURE — 85025 COMPLETE CBC W/AUTO DIFF WBC: CPT

## 2018-07-07 PROCEDURE — 83735 ASSAY OF MAGNESIUM: CPT

## 2018-07-07 PROCEDURE — 63600175 PHARM REV CODE 636 W HCPCS: Performed by: INTERNAL MEDICINE

## 2018-07-07 PROCEDURE — A4216 STERILE WATER/SALINE, 10 ML: HCPCS | Performed by: INTERNAL MEDICINE

## 2018-07-07 PROCEDURE — 25000003 PHARM REV CODE 250: Performed by: STUDENT IN AN ORGANIZED HEALTH CARE EDUCATION/TRAINING PROGRAM

## 2018-07-07 PROCEDURE — 25000003 PHARM REV CODE 250: Performed by: INTERNAL MEDICINE

## 2018-07-07 PROCEDURE — 80053 COMPREHEN METABOLIC PANEL: CPT

## 2018-07-07 PROCEDURE — 63600175 PHARM REV CODE 636 W HCPCS: Mod: JG | Performed by: INTERNAL MEDICINE

## 2018-07-07 PROCEDURE — 25000003 PHARM REV CODE 250: Performed by: NURSE PRACTITIONER

## 2018-07-07 PROCEDURE — 99233 SBSQ HOSP IP/OBS HIGH 50: CPT | Mod: GC,,, | Performed by: INTERNAL MEDICINE

## 2018-07-07 PROCEDURE — 20600001 HC STEP DOWN PRIVATE ROOM

## 2018-07-07 RX ADMIN — ACYCLOVIR 400 MG: 200 CAPSULE ORAL at 09:07

## 2018-07-07 RX ADMIN — CEFPODOXIME PROXETIL 200 MG: 200 TABLET, FILM COATED ORAL at 09:07

## 2018-07-07 RX ADMIN — VORICONAZOLE 200 MG: 200 TABLET ORAL at 08:07

## 2018-07-07 RX ADMIN — LISINOPRIL 5 MG: 2.5 TABLET ORAL at 09:07

## 2018-07-07 RX ADMIN — ESCITALOPRAM 10 MG: 5 TABLET, FILM COATED ORAL at 09:07

## 2018-07-07 RX ADMIN — ALPRAZOLAM 0.25 MG: 0.25 TABLET ORAL at 09:07

## 2018-07-07 RX ADMIN — CEFPODOXIME PROXETIL 200 MG: 200 TABLET, FILM COATED ORAL at 08:07

## 2018-07-07 RX ADMIN — METOPROLOL SUCCINATE 25 MG: 25 TABLET, EXTENDED RELEASE ORAL at 09:07

## 2018-07-07 RX ADMIN — PANTOPRAZOLE SODIUM 40 MG: 40 TABLET, DELAYED RELEASE ORAL at 09:07

## 2018-07-07 RX ADMIN — FILGRASTIM 480 MCG: 480 INJECTION, SOLUTION INTRAVENOUS; SUBCUTANEOUS at 06:07

## 2018-07-07 RX ADMIN — MAGNESIUM SULFATE IN WATER 2 G: 40 INJECTION, SOLUTION INTRAVENOUS at 06:07

## 2018-07-07 RX ADMIN — FUROSEMIDE 20 MG: 20 TABLET ORAL at 09:07

## 2018-07-07 RX ADMIN — Medication 10 ML: at 06:07

## 2018-07-07 RX ADMIN — MAGNESIUM SULFATE IN WATER 2 G: 40 INJECTION, SOLUTION INTRAVENOUS at 09:07

## 2018-07-07 RX ADMIN — VORICONAZOLE 200 MG: 200 TABLET ORAL at 09:07

## 2018-07-07 RX ADMIN — ACYCLOVIR 400 MG: 200 CAPSULE ORAL at 08:07

## 2018-07-07 RX ADMIN — ALPRAZOLAM 0.25 MG: 0.25 TABLET ORAL at 08:07

## 2018-07-07 RX ADMIN — VITAMIN D, TAB 1000IU (100/BT) 5000 UNITS: 25 TAB at 09:07

## 2018-07-07 NOTE — ASSESSMENT & PLAN NOTE
- Admitted from [facility] on 5/25/18 early AM with WBC around 100s, for 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 Ordered routine labs in addition to FLT3, NGS, and AML FISH.  - lo res HLA typing on 5/26/18 and hi res on 5/27/18 done in anticipation of possible need for future transplant   - Pt with two daughters, two full sisters (in their mid 60s, one with brain aneurysm hx, other one without any medical issues), and one full brother (59 y/o healthy).  - ECHO ordered 5/25/18, EF 65%  - PICC line placed 5/25/18   - initially on hydrea, discontinued on 5/28.   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  - started on induction therapy on 5/29/18.  - Day 39 of 7+3.    - allopurinol stopped 6/11  - prophylactic antimicrobials: Acyclovir, Voriconazole  - will need CNS Prophylaxis given her elevated WBC (high risk) on admission after count recovery.  - NPM1 +, CEBPA (-), FLT3 (+).  On Midostaurin 50 mg PO BID until Day 14 (held starting 6/8 to 6/11). Stopped when FLAG JENNIFER re-induction started. Restarted Midostaurin at 25 mg bid on day 8 of FLAG JENNIFER induction (6/22), increase to full dose 50 mg bid (6/26) as improvement in liver enzymes. Stopped 7/3/18 due to abnormal cardiac echo.  - day 14 bone marrow biopsy completed 6/11 showing persistent disease with 15% CD 34 positive blasts  - Day 23 of FLAG-JENNIFER, tolerating without difficulty except nausea/vomiting  - repeat 2D echo on 6/15 with preserved EF of 60%.  However 7/2/18 echo with reduced EF 30-35%  - day 14 restaging bone marrow biopsy done 6/29 without complication, results with no evidence of AML, LFT 3 negative, will need repeat marrow at count recovery  - awaiting count recovery for safe discharge home

## 2018-07-07 NOTE — ASSESSMENT & PLAN NOTE
- secondary to chemotherapy.   - WBC 0.05 and ANC 0, on Neupogen per FLAG JENNIFER protocol  - hgb 7.6 and platelet count 23,000 today  - tranfuse if Hb < 7 g/dL and Plt < 10,000/mm3 or active bleed.

## 2018-07-07 NOTE — PLAN OF CARE
Problem: Patient Care Overview  Goal: Plan of Care Review  Outcome: Ongoing (interventions implemented as appropriate)  Patient remains free from falls and injury this shift. Bed in low, locked position with call bell in reach. Patient encouraged to call for assistance when getting out of bed. Patient verbalized understanding. All belongings within reach. Afebrile. Tolerating meals and PO meds with no c/o nausea. Xanax given this AM for anxiety. Magnesium replacements given as ordered. Neutropenic and thrombocytopenic precautions maintained. Discharge pending count recovery. ANC currently 30 - Neupogen continued. will continue to monitor.

## 2018-07-07 NOTE — SUBJECTIVE & OBJECTIVE
Subjective:     Interval History: Appetite slowly improving.  Ate 25% of breakfast, 50% of lunch, and 25% of dinner. Denies fevers, chills, cough, abdominal pain, nausea, vomiting, or diarrhea.    Objective:     Vital Signs (Most Recent):  Temp: 98.2 °F (36.8 °C) (07/07/18 1543)  Pulse: 80 (07/07/18 1543)  Resp: 16 (07/07/18 1543)  BP: 125/65 (07/07/18 1543)  SpO2: 95 % (07/07/18 1543) Vital Signs (24h Range):  Temp:  [97.4 °F (36.3 °C)-98.5 °F (36.9 °C)] 98.2 °F (36.8 °C)  Pulse:  [] 80  Resp:  [16-19] 16  SpO2:  [92 %-96 %] 95 %  BP: (121-133)/(63-80) 125/65     Weight: 64.4 kg (141 lb 15.6 oz)  Body mass index is 25.15 kg/m².  Body surface area is 1.69 meters squared.    ECOG SCORE         [unfilled]    Intake/Output - Last 3 Shifts       07/05 0700 - 07/06 0659 07/06 0700 - 07/07 0659 07/07 0700 - 07/08 0659    P.O. 820 1820 750    I.V. (mL/kg)  50 (0.8) 100 (1.6)    Blood  492.8     IV Piggyback 50      Total Intake(mL/kg) 870 (13.7) 2362.8 (36.7) 850 (13.2)    Urine (mL/kg/hr) 1250 (0.8) 1600 (1) 600 (1)    Total Output 1250 1600 600    Net -380 +762.8 +250           Urine Occurrence 1 x 1 x 1 x    Stool Occurrence 1 x 1 x 1 x          Physical Exam   Constitutional: She is oriented to person, place, and time. She appears well-developed and well-nourished. No distress.   HENT:   Mouth/Throat: Oropharynx is clear and moist. No oropharyngeal exudate or posterior oropharyngeal erythema.   Eyes: Conjunctivae are normal. Pupils are equal, round, and reactive to light. No scleral icterus.   Neck: Normal range of motion. Neck supple. No thyromegaly present.   Cardiovascular: Normal rate, regular rhythm, normal heart sounds and intact distal pulses.    Pulmonary/Chest: Effort normal and breath sounds normal. No respiratory distress.   Abdominal: Soft. Bowel sounds are normal. She exhibits no distension. There is no splenomegaly or hepatomegaly. There is no tenderness.   Musculoskeletal: Normal range of motion.  She exhibits no edema or tenderness.   Neurological: She is alert and oriented to person, place, and time. No cranial nerve deficit. Coordination normal.   Skin: No rash noted. No cyanosis. No pallor. Nails show no clubbing.   PICC intact R arm no redness or drainage     Psychiatric: She has a normal mood and affect. Thought content normal.   Vitals reviewed.      Significant Labs:   CBC:   Recent Labs  Lab 07/06/18  0400 07/07/18  0400   WBC 0.02* 0.05*   HGB 7.2* 7.6*   HCT 21.2* 22.5*   PLT 6* 23*    and CMP:   Recent Labs  Lab 07/06/18  0400 07/07/18  0400    136   K 3.7 4.1    105   CO2 25 25   GLU 97 96   BUN 8 8   CREATININE 0.6 0.6   CALCIUM 8.5* 9.1   PROT 5.8* 6.2   ALBUMIN 2.6* 2.8*   BILITOT 0.8 0.8   ALKPHOS 318* 315*   AST 8* 8*   ALT <5* 6*   ANIONGAP 7* 6*   EGFRNONAA >60.0 >60.0       Diagnostic Results:  None

## 2018-07-07 NOTE — PLAN OF CARE
Problem: Patient Care Overview  Goal: Plan of Care Review  Outcome: Ongoing (interventions implemented as appropriate)  Plan of care reviewed with the patient at the beginning of the shift. Pt admitted for newly diagnosed AML. She is s/p 7&3 and Day 23 of FLAG-JENNIFER. She is awaiting count recovery. Nausea and diarrhea have improved. Anxiety xanax which was given once. No emesis or bm tonight. Small bland meals encouraged. Pt reports better PO intake. She does still have some overall fatigue. She is up independently. Fall precautions maintained. Pt remained free from falls and injury this shift. Bed locked in lowest position, side rails up x2, call light within reach. Instructed pt to call for assistance as needed. Vitals stable. Pt afebrile. Neutropenic precautions maintained. No acute issues overnight. Will continue to monitor.

## 2018-07-07 NOTE — ASSESSMENT & PLAN NOTE
AST and ALT have normalized. Alk phos improved to 315 today.   -   - midostaurin started day 8 at 25 mg bid, monitoring liver enzymes closely and improved. Increasing Midostaurin to 50 mg bid 6/26. Stopped Midostaurin (7/3) due to new diagnosis of systolic heart failure EF 35%.  - 6/22/18 liver US showing mild intrahepatic dilation, otherwise impression of hepatic steatosis.  Will hold off of MRCP at this time.

## 2018-07-08 LAB
ALBUMIN SERPL BCP-MCNC: 2.9 G/DL
ALP SERPL-CCNC: 296 U/L
ALT SERPL W/O P-5'-P-CCNC: <5 U/L
ANION GAP SERPL CALC-SCNC: 9 MMOL/L
AST SERPL-CCNC: 10 U/L
BASOPHILS # BLD AUTO: 0 K/UL
BASOPHILS NFR BLD: 0 %
BILIRUB SERPL-MCNC: 0.8 MG/DL
BUN SERPL-MCNC: 9 MG/DL
CALCIUM SERPL-MCNC: 9 MG/DL
CHLORIDE SERPL-SCNC: 103 MMOL/L
CO2 SERPL-SCNC: 24 MMOL/L
CREAT SERPL-MCNC: 0.6 MG/DL
DIFFERENTIAL METHOD: ABNORMAL
EOSINOPHIL # BLD AUTO: 0 K/UL
EOSINOPHIL NFR BLD: 0 %
ERYTHROCYTE [DISTWIDTH] IN BLOOD BY AUTOMATED COUNT: 12.9 %
EST. GFR  (AFRICAN AMERICAN): >60 ML/MIN/1.73 M^2
EST. GFR  (NON AFRICAN AMERICAN): >60 ML/MIN/1.73 M^2
GLUCOSE SERPL-MCNC: 100 MG/DL
HCT VFR BLD AUTO: 23.3 %
HGB BLD-MCNC: 8 G/DL
IMM GRANULOCYTES # BLD AUTO: 0 K/UL
IMM GRANULOCYTES NFR BLD AUTO: 0 %
LYMPHOCYTES # BLD AUTO: 0 K/UL
LYMPHOCYTES NFR BLD: 18.2 %
MAGNESIUM SERPL-MCNC: 1.4 MG/DL
MCH RBC QN AUTO: 30.4 PG
MCHC RBC AUTO-ENTMCNC: 34.3 G/DL
MCV RBC AUTO: 89 FL
MONOCYTES # BLD AUTO: 0 K/UL
MONOCYTES NFR BLD: 0 %
NEUTROPHILS # BLD AUTO: 0.1 K/UL
NEUTROPHILS NFR BLD: 81.8 %
NRBC BLD-RTO: 0 /100 WBC
PHOSPHATE SERPL-MCNC: 3.1 MG/DL
PLATELET # BLD AUTO: 14 K/UL
PMV BLD AUTO: 12.1 FL
POTASSIUM SERPL-SCNC: 4 MMOL/L
PROT SERPL-MCNC: 6.4 G/DL
RBC # BLD AUTO: 2.63 M/UL
SODIUM SERPL-SCNC: 136 MMOL/L
WBC # BLD AUTO: 0.11 K/UL

## 2018-07-08 PROCEDURE — 84100 ASSAY OF PHOSPHORUS: CPT

## 2018-07-08 PROCEDURE — 80053 COMPREHEN METABOLIC PANEL: CPT

## 2018-07-08 PROCEDURE — 25000003 PHARM REV CODE 250: Performed by: INTERNAL MEDICINE

## 2018-07-08 PROCEDURE — 85025 COMPLETE CBC W/AUTO DIFF WBC: CPT

## 2018-07-08 PROCEDURE — 63600175 PHARM REV CODE 636 W HCPCS: Mod: JG | Performed by: INTERNAL MEDICINE

## 2018-07-08 PROCEDURE — 25000003 PHARM REV CODE 250: Performed by: NURSE PRACTITIONER

## 2018-07-08 PROCEDURE — 83735 ASSAY OF MAGNESIUM: CPT

## 2018-07-08 PROCEDURE — 25000003 PHARM REV CODE 250: Performed by: HOSPITALIST

## 2018-07-08 PROCEDURE — 25000003 PHARM REV CODE 250: Performed by: STUDENT IN AN ORGANIZED HEALTH CARE EDUCATION/TRAINING PROGRAM

## 2018-07-08 PROCEDURE — A4216 STERILE WATER/SALINE, 10 ML: HCPCS | Performed by: INTERNAL MEDICINE

## 2018-07-08 PROCEDURE — 99233 SBSQ HOSP IP/OBS HIGH 50: CPT | Mod: GC,,, | Performed by: INTERNAL MEDICINE

## 2018-07-08 PROCEDURE — 20600001 HC STEP DOWN PRIVATE ROOM

## 2018-07-08 RX ORDER — LANOLIN ALCOHOL/MO/W.PET/CERES
400 CREAM (GRAM) TOPICAL 2 TIMES DAILY
Status: DISCONTINUED | OUTPATIENT
Start: 2018-07-08 | End: 2018-07-11

## 2018-07-08 RX ADMIN — VORICONAZOLE 200 MG: 200 TABLET ORAL at 08:07

## 2018-07-08 RX ADMIN — METOPROLOL SUCCINATE 25 MG: 25 TABLET, EXTENDED RELEASE ORAL at 09:07

## 2018-07-08 RX ADMIN — VITAMIN D, TAB 1000IU (100/BT) 5000 UNITS: 25 TAB at 10:07

## 2018-07-08 RX ADMIN — CEFPODOXIME PROXETIL 200 MG: 200 TABLET, FILM COATED ORAL at 08:07

## 2018-07-08 RX ADMIN — ALPRAZOLAM 0.25 MG: 0.25 TABLET ORAL at 08:07

## 2018-07-08 RX ADMIN — PANTOPRAZOLE SODIUM 40 MG: 40 TABLET, DELAYED RELEASE ORAL at 09:07

## 2018-07-08 RX ADMIN — Medication 10 ML: at 06:07

## 2018-07-08 RX ADMIN — FILGRASTIM 480 MCG: 480 INJECTION, SOLUTION INTRAVENOUS; SUBCUTANEOUS at 06:07

## 2018-07-08 RX ADMIN — Medication 10 ML: at 12:07

## 2018-07-08 RX ADMIN — ACYCLOVIR 400 MG: 200 CAPSULE ORAL at 09:07

## 2018-07-08 RX ADMIN — CEFPODOXIME PROXETIL 200 MG: 200 TABLET, FILM COATED ORAL at 09:07

## 2018-07-08 RX ADMIN — ESCITALOPRAM 10 MG: 5 TABLET, FILM COATED ORAL at 09:07

## 2018-07-08 RX ADMIN — FUROSEMIDE 20 MG: 20 TABLET ORAL at 09:07

## 2018-07-08 RX ADMIN — VORICONAZOLE 200 MG: 200 TABLET ORAL at 09:07

## 2018-07-08 RX ADMIN — LISINOPRIL 5 MG: 2.5 TABLET ORAL at 09:07

## 2018-07-08 RX ADMIN — Medication 400 MG: at 09:07

## 2018-07-08 RX ADMIN — ALPRAZOLAM 0.25 MG: 0.25 TABLET ORAL at 05:07

## 2018-07-08 RX ADMIN — Medication 400 MG: at 08:07

## 2018-07-08 RX ADMIN — ACYCLOVIR 400 MG: 200 CAPSULE ORAL at 08:07

## 2018-07-08 NOTE — ASSESSMENT & PLAN NOTE
AST and ALT have normalized. Alk phos downtrend continues, 296 today.   -   - midostaurin started day 8 at 25 mg bid, monitoring liver enzymes closely and improved. Increasing Midostaurin to 50 mg bid 6/26. Stopped Midostaurin (7/3) due to new diagnosis of systolic heart failure EF 35%.  - 6/22/18 liver US showing mild intrahepatic dilation, otherwise impression of hepatic steatosis.  Will hold off of MRCP at this time.

## 2018-07-08 NOTE — PLAN OF CARE
Problem: Patient Care Overview  Goal: Plan of Care Review  Outcome: Ongoing (interventions implemented as appropriate)  Patient remains free from falls and injury this shift. Bed in low, locked position with call bell in reach. Patient encouraged to call for assistance when getting out of bed. Patient verbalized understanding. All belongings within reach. Afebrile. Tolerating meals and PO meds with one episode of dry heaving. Xanax given this AM for anxiety. Magnesium replacements given as ordered. Neutropenic and thrombocytopenic precautions maintained. Discharge pending count recovery. ANC currently 89 - Neupogen continued. will continue to monitor.

## 2018-07-08 NOTE — ASSESSMENT & PLAN NOTE
- Admitted from [facility] on 5/25/18 early AM with WBC around 100s, for 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 Ordered routine labs in addition to FLT3, NGS, and AML FISH.  - lo res HLA typing on 5/26/18 and hi res on 5/27/18 done in anticipation of possible need for future transplant   - Pt with two daughters, two full sisters (in their mid 60s, one with brain aneurysm hx, other one without any medical issues), and one full brother (59 y/o healthy).  - ECHO ordered 5/25/18, EF 65%  - PICC line placed 5/25/18   - initially on hydrea, discontinued on 5/28.   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  - started on induction therapy on 5/29/18.  - Day 41 of 7+3.    - allopurinol stopped 6/11  - prophylactic antimicrobials: Acyclovir, Voriconazole  - will need CNS Prophylaxis given her elevated WBC (high risk) on admission after count recovery.  - NPM1 +, CEBPA (-), FLT3 (+).  On Midostaurin 50 mg PO BID until Day 14 (held starting 6/8 to 6/11). Stopped when FLAG JENNIFER re-induction started. Restarted Midostaurin at 25 mg bid on day 8 of FLAG JENNIFER induction (6/22), increase to full dose 50 mg bid (6/26) as improvement in liver enzymes. Stopped 7/3/18 due to abnormal cardiac echo.  - day 14 bone marrow biopsy completed 6/11 showing persistent disease with 15% CD 34 positive blasts  - Day 24 of FLAG-JENNIFER, tolerating without difficulty except nausea/vomiting  - repeat 2D echo on 6/15 with preserved EF of 60%.  However 7/2/18 echo with reduced EF 30-35%  - day 14 restaging bone marrow biopsy done 6/29 without complication, results with no evidence of AML, LFT 3 negative, will need repeat marrow at count recovery  - awaiting count recovery for safe discharge home

## 2018-07-08 NOTE — PROGRESS NOTES
[facility]  Hematology  Bone Marrow Transplant  Progress Note    Patient Name: Noreen Mac  Admission Date: 5/24/2018  Hospital Length of Stay: 45 days  Code Status: Full Code    Subjective:     Interval History: Only issue today is fatigue that is unchanged from baseline.  Otherwise she says she feels better.  No fevers, chills, chest pain, nausea, vomiting, abdominal pain, or diarrhea.  Dyspnea on exertion when she has to get up and use the bathroom but that is unchanged since her admission.    Objective:     Vital Signs (Most Recent):  Temp: 96.9 °F (36.1 °C) (07/08/18 1219)  Pulse: 96 (07/08/18 1219)  Resp: 19 (07/08/18 1219)  BP: 125/61 (07/08/18 1219)  SpO2: 97 % (07/08/18 1219) Vital Signs (24h Range):  Temp:  [96.9 °F (36.1 °C)-98.6 °F (37 °C)] 96.9 °F (36.1 °C)  Pulse:  [80-97] 96  Resp:  [16-20] 19  SpO2:  [93 %-97 %] 97 %  BP: (109-144)/(61-76) 125/61     Weight: 63.5 kg (139 lb 15.9 oz)  Body mass index is 24.8 kg/m².  Body surface area is 1.68 meters squared.    ECOG SCORE         [unfilled]    Intake/Output - Last 3 Shifts       07/06 0700 - 07/07 0659 07/07 0700 - 07/08 0659 07/08 0700 - 07/09 0659    P.O. 1820 900 630    I.V. (mL/kg) 50 (0.8) 100 (1.6)     Blood 492.8      Total Intake(mL/kg) 2362.8 (36.7) 1000 (15.7) 630 (9.9)    Urine (mL/kg/hr) 1600 (1) 1475 (1) 150 (0.3)    Total Output 1600 1475 150    Net +762.8 -475 +480           Urine Occurrence 1 x 1 x 1 x    Stool Occurrence 1 x 1 x 1 x          Physical Exam   Constitutional: She is oriented to person, place, and time. She appears well-developed and well-nourished. No distress.   HENT:   Mouth/Throat: Oropharynx is clear and moist. No oropharyngeal exudate or posterior oropharyngeal erythema.   Eyes: Conjunctivae are normal. Pupils are equal, round, and reactive to light. No scleral icterus.   Neck: Normal range of motion. Neck supple. No thyromegaly present.   Cardiovascular: Normal rate, regular rhythm, normal  heart sounds and intact distal pulses.    Pulmonary/Chest: Effort normal and breath sounds normal. No respiratory distress.   Abdominal: Soft. Bowel sounds are normal. She exhibits no distension. There is no splenomegaly or hepatomegaly. There is no tenderness.   Musculoskeletal: Normal range of motion. She exhibits no edema or tenderness.   Neurological: She is alert and oriented to person, place, and time. No cranial nerve deficit. Coordination normal.   Skin: No rash noted. No cyanosis. No pallor. Nails show no clubbing.   PICC intact R arm no redness or drainage     Psychiatric: She has a normal mood and affect. Thought content normal.   Vitals reviewed.      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 Ordered routine labs in addition to FLT3, NGS, and AML FISH.  - lo res HLA typing on 5/26/18 and hi res on 5/27/18 done in anticipation of possible need for future transplant   - Pt with two daughters, two full sisters (in their mid 60s, one with brain aneurysm hx, other one without any medical issues), and one full brother (61 y/o healthy).  - ECHO ordered 5/25/18, EF 65%  - PICC line placed 5/25/18   - initially on hydrea, discontinued on 5/28.   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  -  started on induction therapy on 5/29/18.  - Day 41 of 7+3.    - allopurinol stopped 6/11  - prophylactic antimicrobials: Acyclovir, Voriconazole  - will need CNS Prophylaxis given her elevated WBC (high risk) on admission after count recovery.  - NPM1 +, CEBPA (-), FLT3 (+).  On Midostaurin 50 mg PO BID until Day 14 (held starting 6/8 to 6/11). Stopped when FLAG JENNIFER re-induction started. Restarted Midostaurin at 25 mg bid on day 8 of FLAG JENNIFER induction (6/22), increase to full dose 50 mg bid (6/26) as improvement in liver enzymes. Stopped 7/3/18 due to abnormal cardiac echo.  - day 14 bone marrow biopsy completed 6/11 showing persistent disease with 15% CD 34 positive blasts  - Day 24 of FLAG-JENNIFER, tolerating without difficulty except nausea/vomiting  - repeat 2D echo on 6/15 with preserved EF of 60%.  However 7/2/18 echo with reduced EF 30-35%  - day 14 restaging bone marrow biopsy done 6/29 without complication, results with no evidence of AML, LFT 3 negative, will need repeat marrow at count recovery  - awaiting count recovery for safe discharge home          Chemotherapy induced cardiomyopathy    - sob and O2 requirements--resolved  - chest x-ray with pulmonary edema  - 2 D echo from 7/2 with EF of 30-35%, pulmonary HTN and severe L atrial enlargement  - cardiology following, appreciate recs  - Lopressor was discontinued.  Started Lisinopril and Toprol XL and po lasix on 7/3  - cardiology follow-up as outpatient in 3 months        Hypophosphatemia    - phos wnl at 3.1 this am  - replace IV per electrolyte protocol        Hypokalemia    - K at 4.0 today        CINV (chemotherapy-induced nausea and vomiting)    - completed olanzapine daily x 6 days  - prn Zofran and Ativan, Zofran stopped due to QTc prolongation  - improved with scheduled Compazine IV q6hr, changed back to PRN today  - no recent emesis  - meds converted back to po and tolerating without difficulty        Abnormal liver enzymes    AST and ALT have  normalized. Alk phos downtrend continues, 296 today.   -   - midostaurin started day 8 at 25 mg bid, monitoring liver enzymes closely and improved. Increasing Midostaurin to 50 mg bid 6/26. Stopped Midostaurin (7/3) due to new diagnosis of systolic heart failure EF 35%.  - 6/22/18 liver US showing mild intrahepatic dilation, otherwise impression of hepatic steatosis.  Will hold off of MRCP at this time.          Hypomagnesemia    - Mag 1.4 today  --patient wants to try oral replacement. Started on mag oxide 400mg bid            Pancytopenia    - secondary to chemotherapy.   - WBC 0.11 and , on Neupogen per FLAG JENN[facility]ER protocol  - hgb 8 and platelet count 14,000 today  - tranfuse if Hb < 7 g/dL and Plt < 10,000/mm3 or active bleed.                MCTD (mixed connective tissue disease)    - MCTD-NOS  - previously on MTX and Prednisone.   - off therapy now and otherwise doing well.   - can follow up outpatient with her Rheumatologist upon discharge.         Staphylococcus epidermidis bacteremia    - blood cultures from 6/11 at 0430 with gram + cocci, staph epidermis sensitive for Vancomycin, CXR and UA unremarkable.   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  - CT Chest on 5/28: Multiple scattered noncalcified pulmonary granulomas, the largest measuring 8 mm in the lateral segment of the right middle lobe.   - repeat non-contrast chest CT at 6-12 months         Insomnia    - PRN nightly Xanax.          HTN (hypertension)    - history of SVT and reports intermittent palpitations at times.  - EKG on 5/28 - NSR.  - increased metoprolol XL to 25 mg QD,she feels better with this dose.  - metoprolol tartrate changed on 6/13 to 25 mg bid (hold for SBP <100) due to hypotension  - cardiology consulted for abnormal echo and lopressor stopped and lisinopril and Toprol XL started  --HTN adequately controlled        Osteoporosis    - takes prolia once every 6 months, last given mid April 2018   - no acute issue         Depression    - no acute issue   - continue home Escitalopram             VTE Risk Mitigation         Ordered     heparin, porcine (PF) 100 unit/mL injection flush 300 Units  As needed (PRN)      06/15/18 1657     Place MAYLIN hose  Until discontinued      05/25/18 0032     IP VTE HIGH RISK PATIENT  Once      05/25/18 0032          Disposition: inpatient    Alex Chu MD  Bone Marrow Transplant  [facility]

## 2018-07-08 NOTE — SUBJECTIVE & OBJECTIVE
Subjective:     Interval History: Only issue today is fatigue that is unchanged from baseline.  Otherwise she says she feels better.  No fevers, chills, chest pain, nausea, vomiting, abdominal pain, or diarrhea.  Dyspnea on exertion when she has to get up and use the bathroom but that is unchanged since her admission.    Objective:     Vital Signs (Most Recent):  Temp: 96.9 °F (36.1 °C) (07/08/18 1219)  Pulse: 96 (07/08/18 1219)  Resp: 19 (07/08/18 1219)  BP: 125/61 (07/08/18 1219)  SpO2: 97 % (07/08/18 1219) Vital Signs (24h Range):  Temp:  [96.9 °F (36.1 °C)-98.6 °F (37 °C)] 96.9 °F (36.1 °C)  Pulse:  [80-97] 96  Resp:  [16-20] 19  SpO2:  [93 %-97 %] 97 %  BP: (109-144)/(61-76) 125/61     Weight: 63.5 kg (139 lb 15.9 oz)  Body mass index is 24.8 kg/m².  Body surface area is 1.68 meters squared.    ECOG SCORE         [unfilled]    Intake/Output - Last 3 Shifts       07/06 0700 - 07/07 0659 07/07 0700 - 07/08 0659 07/08 0700 - 07/09 0659    P.O. 1820 900 630    I.V. (mL/kg) 50 (0.8) 100 (1.6)     Blood 492.8      Total Intake(mL/kg) 2362.8 (36.7) 1000 (15.7) 630 (9.9)    Urine (mL/kg/hr) 1600 (1) 1475 (1) 150 (0.3)    Total Output 1600 1475 150    Net +762.8 -475 +480           Urine Occurrence 1 x 1 x 1 x    Stool Occurrence 1 x 1 x 1 x          Physical Exam   Constitutional: She is oriented to person, place, and time. She appears well-developed and well-nourished. No distress.   HENT:   Mouth/Throat: Oropharynx is clear and moist. No oropharyngeal exudate or posterior oropharyngeal erythema.   Eyes: Conjunctivae are normal. Pupils are equal, round, and reactive to light. No scleral icterus.   Neck: Normal range of motion. Neck supple. No thyromegaly present.   Cardiovascular: Normal rate, regular rhythm, normal heart sounds and intact distal pulses.    Pulmonary/Chest: Effort normal and breath sounds normal. No respiratory distress.   Abdominal: Soft. Bowel sounds are normal. She exhibits no distension. There is  no splenomegaly or hepatomegaly. There is no tenderness.   Musculoskeletal: Normal range of motion. She exhibits no edema or tenderness.   Neurological: She is alert and oriented to person, place, and time. No cranial nerve deficit. Coordination normal.   Skin: No rash noted. No cyanosis. No pallor. Nails show no clubbing.   PICC intact R arm no redness or drainage     Psychiatric: She has a normal mood and affect. Thought content normal.   Vitals reviewed.      Significant Labs:   CBC:   Recent Labs  Lab 07/07/18  0400 07/08/18  0429   WBC 0.05* 0.11*   HGB 7.6* 8.0*   HCT 22.5* 23.3*   PLT 23* 14*    and CMP:   Recent Labs  Lab 07/07/18  0400 07/08/18  0429    136   K 4.1 4.0    103   CO2 25 24   GLU 96 100   BUN 8 9   CREATININE 0.6 0.6   CALCIUM 9.1 9.0   PROT 6.2 6.4   ALBUMIN 2.8* 2.9*   BILITOT 0.8 0.8   ALKPHOS 315* 296*   AST 8* 10   ALT 6* <5*   ANIONGAP 6* 9   EGFRNONAA >60.0 >60.0       Diagnostic Results:  None

## 2018-07-08 NOTE — ASSESSMENT & PLAN NOTE
- secondary to chemotherapy.   - WBC 0.11 and , on Neupogen per [facility]ENN[facility]ER protocol  - hgb 8 and platelet count 14,000 today  - tranfuse if Hb < 7 g/dL and Plt < 10,000/mm3 or active bleed.

## 2018-07-08 NOTE — ASSESSMENT & PLAN NOTE
- sob and O2 requirements--resolved  - chest x-ray with pulmonary edema  - 2 D echo from 7/2 with EF of 30-35%, pulmonary HTN and severe L atrial enlargement  - cardiology following, appreciate recs  - Lopressor was discontinued.  Started Lisinopril and Toprol XL and po lasix on 7/3  - cardiology follow-up as outpatient in 3 months

## 2018-07-08 NOTE — ASSESSMENT & PLAN NOTE
- history of SVT and reports intermittent palpitations at times.  - EKG on 5/28 - NSR.  - increased metoprolol XL to 25 mg QD,she feels better with this dose.  - metoprolol tartrate changed on 6/13 to 25 mg bid (hold for SBP <100) due to hypotension  - cardiology consulted for abnormal echo and lopressor stopped and lisinopril and Toprol XL started  --HTN adequately controlled

## 2018-07-08 NOTE — PLAN OF CARE
Problem: Patient Care Overview  Goal: Plan of Care Review  Outcome: Ongoing (interventions implemented as appropriate)  Plan of care reviewed with the patient at the beginning of the shift. Pt admitted for newly diagnosed AML. She is s/p 7&3 and Day 24 of FLAG-JENNIFER. She is awaiting count recovery. Nausea and diarrhea have improved. Anxiety and nausea managed with xanax which was given once. No emesis or bm tonight. Small bland meals encouraged. Pt reports better PO intake. She does still have some overall fatigue and complaints of feeling tired. She is up independently. Fall precautions maintained. Pt remained free from falls and injury this shift. Bed locked in lowest position, side rails up x2, call light within reach. Instructed pt to call for assistance as needed. Vitals stable. Pt afebrile. Neutropenic precautions maintained. No acute issues overnight. Will continue to monitor.

## 2018-07-09 LAB
ABO + RH BLD: NORMAL
ALBUMIN SERPL BCP-MCNC: 2.9 G/DL
ALP SERPL-CCNC: 290 U/L
ALT SERPL W/O P-5'-P-CCNC: 5 U/L
ANION GAP SERPL CALC-SCNC: 10 MMOL/L
ANISOCYTOSIS BLD QL SMEAR: SLIGHT
AST SERPL-CCNC: 9 U/L
BASOPHILS # BLD AUTO: ABNORMAL K/UL
BASOPHILS NFR BLD: 0 %
BILIRUB SERPL-MCNC: 0.7 MG/DL
BLD GP AB SCN CELLS X3 SERPL QL: NORMAL
BLD PROD TYP BPU: NORMAL
BLOOD UNIT EXPIRATION DATE: NORMAL
BLOOD UNIT TYPE CODE: 6200
BLOOD UNIT TYPE: NORMAL
BUN SERPL-MCNC: 10 MG/DL
CALCIUM SERPL-MCNC: 9.1 MG/DL
CHLORIDE SERPL-SCNC: 103 MMOL/L
CHROM BANDING METHOD: NORMAL
CHROMOSOME ANALYSIS BM ADDITIONAL INFORMATION: NORMAL
CHROMOSOME ANALYSIS BM RELEASED BY: NORMAL
CHROMOSOME ANALYSIS BM RESULT SUMMARY: NORMAL
CLINICAL CYTOGENETICIST REVIEW: NORMAL
CO2 SERPL-SCNC: 24 MMOL/L
CODING SYSTEM: NORMAL
CREAT SERPL-MCNC: 0.6 MG/DL
DIFFERENTIAL METHOD: ABNORMAL
DISPENSE STATUS: NORMAL
EOSINOPHIL # BLD AUTO: ABNORMAL K/UL
EOSINOPHIL NFR BLD: 0 %
ERYTHROCYTE [DISTWIDTH] IN BLOOD BY AUTOMATED COUNT: 12.9 %
EST. GFR  (AFRICAN AMERICAN): >60 ML/MIN/1.73 M^2
EST. GFR  (NON AFRICAN AMERICAN): >60 ML/MIN/1.73 M^2
GLUCOSE SERPL-MCNC: 96 MG/DL
HCT VFR BLD AUTO: 21.6 %
HGB BLD-MCNC: 7.4 G/DL
IMM GRANULOCYTES # BLD AUTO: ABNORMAL K/UL
IMM GRANULOCYTES NFR BLD AUTO: ABNORMAL %
KARYOTYP MAR: NORMAL
LYMPHOCYTES # BLD AUTO: ABNORMAL K/UL
LYMPHOCYTES NFR BLD: 15 %
MAGNESIUM SERPL-MCNC: 1.5 MG/DL
MCH RBC QN AUTO: 30.1 PG
MCHC RBC AUTO-ENTMCNC: 34.3 G/DL
MCV RBC AUTO: 88 FL
MONOCYTES # BLD AUTO: ABNORMAL K/UL
MONOCYTES NFR BLD: 0 %
NEUTROPHILS NFR BLD: 85 %
NRBC BLD-RTO: 0 /100 WBC
NUM UNITS TRANS WBC-POOR PLATPHERESIS: NORMAL
OVALOCYTES BLD QL SMEAR: ABNORMAL
PHOSPHATE SERPL-MCNC: 3.3 MG/DL
PLATELET # BLD AUTO: 9 K/UL
PLATELET BLD QL SMEAR: ABNORMAL
PMV BLD AUTO: 11.4 FL
POIKILOCYTOSIS BLD QL SMEAR: SLIGHT
POTASSIUM SERPL-SCNC: 3.8 MMOL/L
PROT SERPL-MCNC: 6.4 G/DL
RBC # BLD AUTO: 2.46 M/UL
REASON FOR REFERRAL (NARRATIVE): NORMAL
REF LAB TEST METHOD: NORMAL
SODIUM SERPL-SCNC: 137 MMOL/L
SPECIMEN SOURCE: NORMAL
SPECIMEN: NORMAL
WBC # BLD AUTO: 0.13 K/UL

## 2018-07-09 PROCEDURE — 63600175 PHARM REV CODE 636 W HCPCS: Performed by: NURSE PRACTITIONER

## 2018-07-09 PROCEDURE — 25000003 PHARM REV CODE 250: Performed by: STUDENT IN AN ORGANIZED HEALTH CARE EDUCATION/TRAINING PROGRAM

## 2018-07-09 PROCEDURE — P9037 PLATE PHERES LEUKOREDU IRRAD: HCPCS

## 2018-07-09 PROCEDURE — 25000003 PHARM REV CODE 250: Performed by: NURSE PRACTITIONER

## 2018-07-09 PROCEDURE — 86850 RBC ANTIBODY SCREEN: CPT

## 2018-07-09 PROCEDURE — 25000003 PHARM REV CODE 250: Performed by: INTERNAL MEDICINE

## 2018-07-09 PROCEDURE — 80053 COMPREHEN METABOLIC PANEL: CPT

## 2018-07-09 PROCEDURE — 86644 CMV ANTIBODY: CPT

## 2018-07-09 PROCEDURE — 85027 COMPLETE CBC AUTOMATED: CPT

## 2018-07-09 PROCEDURE — 36430 TRANSFUSION BLD/BLD COMPNT: CPT

## 2018-07-09 PROCEDURE — 99233 SBSQ HOSP IP/OBS HIGH 50: CPT | Mod: GC,,, | Performed by: INTERNAL MEDICINE

## 2018-07-09 PROCEDURE — 63600175 PHARM REV CODE 636 W HCPCS: Mod: JG | Performed by: INTERNAL MEDICINE

## 2018-07-09 PROCEDURE — 25000003 PHARM REV CODE 250: Performed by: HOSPITALIST

## 2018-07-09 PROCEDURE — 20600001 HC STEP DOWN PRIVATE ROOM

## 2018-07-09 PROCEDURE — 85007 BL SMEAR W/DIFF WBC COUNT: CPT

## 2018-07-09 PROCEDURE — A4216 STERILE WATER/SALINE, 10 ML: HCPCS | Performed by: INTERNAL MEDICINE

## 2018-07-09 PROCEDURE — 84100 ASSAY OF PHOSPHORUS: CPT

## 2018-07-09 PROCEDURE — 86920 COMPATIBILITY TEST SPIN: CPT

## 2018-07-09 PROCEDURE — 83735 ASSAY OF MAGNESIUM: CPT

## 2018-07-09 RX ORDER — POTASSIUM CHLORIDE 750 MG/1
20 CAPSULE, EXTENDED RELEASE ORAL
Status: DISCONTINUED | OUTPATIENT
Start: 2018-07-09 | End: 2018-07-13 | Stop reason: HOSPADM

## 2018-07-09 RX ORDER — LANOLIN ALCOHOL/MO/W.PET/CERES
400 CREAM (GRAM) TOPICAL EVERY 4 HOURS PRN
Status: DISCONTINUED | OUTPATIENT
Start: 2018-07-09 | End: 2018-07-13 | Stop reason: HOSPADM

## 2018-07-09 RX ORDER — DIPHENHYDRAMINE HCL 25 MG
25 CAPSULE ORAL ONCE AS NEEDED
Status: COMPLETED | OUTPATIENT
Start: 2018-07-09 | End: 2018-07-09

## 2018-07-09 RX ORDER — HYDROCODONE BITARTRATE AND ACETAMINOPHEN 500; 5 MG/1; MG/1
TABLET ORAL
Status: DISCONTINUED | OUTPATIENT
Start: 2018-07-09 | End: 2018-07-10

## 2018-07-09 RX ORDER — SODIUM,POTASSIUM PHOSPHATES 280-250MG
2 POWDER IN PACKET (EA) ORAL EVERY 4 HOURS PRN
Status: DISCONTINUED | OUTPATIENT
Start: 2018-07-09 | End: 2018-07-13 | Stop reason: HOSPADM

## 2018-07-09 RX ORDER — SODIUM,POTASSIUM PHOSPHATES 280-250MG
1 POWDER IN PACKET (EA) ORAL EVERY 4 HOURS PRN
Status: DISCONTINUED | OUTPATIENT
Start: 2018-07-09 | End: 2018-07-13 | Stop reason: HOSPADM

## 2018-07-09 RX ORDER — LANOLIN ALCOHOL/MO/W.PET/CERES
800 CREAM (GRAM) TOPICAL EVERY 4 HOURS PRN
Status: DISCONTINUED | OUTPATIENT
Start: 2018-07-09 | End: 2018-07-13 | Stop reason: HOSPADM

## 2018-07-09 RX ADMIN — VORICONAZOLE 200 MG: 200 TABLET ORAL at 08:07

## 2018-07-09 RX ADMIN — CEFPODOXIME PROXETIL 200 MG: 200 TABLET, FILM COATED ORAL at 08:07

## 2018-07-09 RX ADMIN — Medication 10 ML: at 12:07

## 2018-07-09 RX ADMIN — PROCHLORPERAZINE EDISYLATE 10 MG: 5 INJECTION INTRAMUSCULAR; INTRAVENOUS at 09:07

## 2018-07-09 RX ADMIN — Medication 400 MG: at 09:07

## 2018-07-09 RX ADMIN — METOPROLOL SUCCINATE 25 MG: 25 TABLET, EXTENDED RELEASE ORAL at 09:07

## 2018-07-09 RX ADMIN — FUROSEMIDE 20 MG: 20 TABLET ORAL at 09:07

## 2018-07-09 RX ADMIN — ALPRAZOLAM 0.25 MG: 0.25 TABLET ORAL at 10:07

## 2018-07-09 RX ADMIN — DIPHENHYDRAMINE HYDROCHLORIDE 25 MG: 25 CAPSULE ORAL at 05:07

## 2018-07-09 RX ADMIN — ACETAMINOPHEN 650 MG: 325 TABLET, FILM COATED ORAL at 05:07

## 2018-07-09 RX ADMIN — Medication 400 MG: at 02:07

## 2018-07-09 RX ADMIN — ESCITALOPRAM 10 MG: 5 TABLET, FILM COATED ORAL at 09:07

## 2018-07-09 RX ADMIN — ALPRAZOLAM 0.25 MG: 0.25 TABLET ORAL at 08:07

## 2018-07-09 RX ADMIN — VITAMIN D, TAB 1000IU (100/BT) 5000 UNITS: 25 TAB at 09:07

## 2018-07-09 RX ADMIN — MORPHINE SULFATE 15 MG: 15 TABLET ORAL at 02:07

## 2018-07-09 RX ADMIN — PANTOPRAZOLE SODIUM 40 MG: 40 TABLET, DELAYED RELEASE ORAL at 09:07

## 2018-07-09 RX ADMIN — Medication 400 MG: at 08:07

## 2018-07-09 RX ADMIN — FILGRASTIM 480 MCG: 480 INJECTION, SOLUTION INTRAVENOUS; SUBCUTANEOUS at 07:07

## 2018-07-09 RX ADMIN — ACYCLOVIR 400 MG: 200 CAPSULE ORAL at 09:07

## 2018-07-09 RX ADMIN — VORICONAZOLE 200 MG: 200 TABLET ORAL at 10:07

## 2018-07-09 RX ADMIN — ACYCLOVIR 400 MG: 200 CAPSULE ORAL at 08:07

## 2018-07-09 RX ADMIN — CEFPODOXIME PROXETIL 200 MG: 200 TABLET, FILM COATED ORAL at 09:07

## 2018-07-09 NOTE — PROGRESS NOTES
[facility]  Hematology  Bone Marrow Transplant  Progress Note    Patient Name: Noreen Mac  Admission Date: 5/24/2018  Hospital Length of Stay: 46 days  Code Status: Full Code    Subjective:     Interval History:   Day 25 FLAG JENNIFER, , continues Neupogen. Received platelets today. Afebrile, VSS. Tolerating all oral meds with no nausea or vomiting. Only complains of fatigue    Objective:     Vital Signs (Most Recent):  Temp: 97 °F (36.1 °C) (07/09/18 1134)  Pulse: 87 (07/09/18 1134)  Resp: 18 (07/09/18 1134)  BP: 127/61 (07/09/18 1134)  SpO2: 99 % (07/09/18 1134) Vital Signs (24h Range):  Temp:  [96.9 °F (36.1 °C)-98.3 °F (36.8 °C)] 97 °F (36.1 °C)  Pulse:  [80-98] 87  Resp:  [16-19] 18  SpO2:  [95 %-99 %] 99 %  BP: (110-128)/(55-62) 127/61     Weight: 63.3 kg (139 lb 7.1 oz)  Body mass index is 24.7 kg/m².  Body surface area is 1.68 meters squared.      Intake/Output - Last 3 Shifts       07/07 0700 - 07/08 0659 07/08 0700 - 07/09 0659 07/09 0700 - 07/10 0659    P.O. 900 1500     I.V. (mL/kg) 100 (1.6)      Blood  500     Total Intake(mL/kg) 1000 (15.7) 2000 (31.6)     Urine (mL/kg/hr) 1475 (1) 450 (0.3) 325 (1.1)    Total Output 1475 450 325    Net -475 +1550 -325           Urine Occurrence 1 x 2 x     Stool Occurrence 1 x 2 x           Physical Exam   Constitutional: She is oriented to person, place, and time. She appears well-developed and well-nourished. No distress.   HENT:   Mouth/Throat: Oropharynx is clear and moist. No oropharyngeal exudate or posterior oropharyngeal erythema.   Eyes: Conjunctivae are normal. Pupils are equal, round, and reactive to light. No scleral icterus.   Neck: Normal range of motion. Neck supple. No thyromegaly present.   Cardiovascular: Normal rate, regular rhythm, normal heart sounds and intact distal pulses.    Pulmonary/Chest: Effort normal and breath sounds normal. No respiratory distress.   Abdominal: Soft. Bowel sounds are normal. She exhibits no  distension. There is no splenomegaly or hepatomegaly. There is no tenderness.   Musculoskeletal: Normal range of motion. She exhibits no edema or tenderness.   Neurological: She is alert and oriented to person, place, and time. No cranial nerve deficit. Coordination normal.   Skin: No rash noted. No cyanosis. No pallor. Nails show no clubbing.   PICC intact R arm no redness or drainage     Psychiatric: She has a normal mood and affect. Thought content normal.   Vitals reviewed.      Significant Labs:   CBC:   Recent Labs  Lab 07/08/18 0429 07/09/18  0400   WBC 0.11* 0.13*   HGB 8.0* 7.4*   HCT 23.3* 21.6*   PLT 14* 9*   , CMP:   Recent Labs  Lab 07/08/18 0429 07/09/18  0400    137   K 4.0 3.8    103   CO2 24 24    96   BUN 9 10   CREATININE 0.6 0.6   CALCIUM 9.0 9.1   PROT 6.4 6.4   ALBUMIN 2.9* 2.9*   BILITOT 0.8 0.7   ALKPHOS 296* 290*   AST 10 9*   ALT <5* 5*   ANIONGAP 9 10   EGFRNONAA >60.0 >60.0    and Coagulation: No results for input(s): PT, INR, APTT in the last 48 hours.    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 Ordered routine labs in addition to FLT3, NGS, and AML FISH.  - lo res HLA typing on 5/26/18 and hi res on 5/27/18 done in anticipation of possible need for future transplant   - Pt with two daughters, two full sisters (in their mid 60s, one with brain aneurysm hx, other one without any medical issues), and one full brother (59 y/o healthy).  - ECHO ordered 5/25/18, EF 65%  - PICC line placed 5/25/18   - initially on hydrea, discontinued on 5/28.   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  - started on induction therapy on 5/29/18.  - Day 42 of 7+3.    - allopurinol stopped 6/11  - prophylactic  antimicrobials: Acyclovir, Voriconazole  - will need CNS Prophylaxis given her elevated WBC (high risk) on admission after count recovery.  - NPM1 +, CEBPA (-), FLT3 (+).  On Midostaurin 50 mg PO BID until Day 14 (held starting 6/8 to 6/11). Stopped when FLAG JENNIFER re-induction started. Restarted Midostaurin at 25 mg bid on day 8 of FLAG JENNIFER induction (6/22), increase to full dose 50 mg bid (6/26) as improvement in liver enzymes. Stopped 7/3/18 due to abnormal cardiac echo.  - day 14 bone marrow biopsy completed 6/11 showing persistent disease with 15% CD 34 positive blasts  - Day 25 of FLAG-JENNIFER, tolerating without difficulty except nausea/vomiting  - repeat 2D echo on 6/15 with preserved EF of 60%.  However 7/2/18 echo with reduced EF 30-35%  - day 14 restaging bone marrow biopsy done 6/29 without complication, results with no evidence of AML, LFT 3 negative, will need repeat marrow at count recovery  - awaiting count recovery for safe discharge home          Staphylococcus epidermidis bacteremia    - blood cultures from 6/11 at 0430 with gram + cocci, staph epidermis sensitive for Vancomycin, CXR and UA unremarkable.   - surveillance blood cultures repeated 6/13 NGTD  - de-escalated aztreonam back to prophylactic cipro 6/16  - ID consulted on 6/19, rec continue Vanc for 2 weeks post last negative culture (EOT 6/27)  - BC from 6/11 with gram negative rods, leptotrichia goodfellowii  - completed Flagyl po x 5 days (EOT 6/24)  - ID has signed off  - patient remains afebrile        Pancytopenia    - secondary to chemotherapy.   - WBC 0.13 and , on Neupogen per [facility]ENN[facility]ER protocol  - hgb 7.4 and platelet count 9,000 today  - tranfuse if Hb < 7 g/dL and Plt < 10,000/mm3 or active bleed.  - transfuse 1 unit of platelets today                Abnormal liver enzymes    AST and ALT have normalized. Alk phos downtrend continues, 290 today.   -   - midostaurin started day 8 at 25 mg bid, monitoring liver enzymes  closely and improved. Increasing Midostaurin to 50 mg bid 6/26. Stopped Midostaurin (7/3) due to new diagnosis of systolic heart failure EF 35%.  - 6/22/18 liver US showing mild intrahepatic dilation, otherwise impression of hepatic steatosis.  Will hold off of MRCP at this time.          Chemotherapy induced cardiomyopathy    - sob and O2 requirements--resolved  - chest x-ray with pulmonary edema  - 2 D echo from 7/2 with EF of 30-35%, pulmonary HTN and severe L atrial enlargement  - cardiology following, appreciate recs  - Lopressor was discontinued.  Started Lisinopril and Toprol XL and po lasix on 7/3  - cardiology follow-up as outpatient in 3 months        Hypophosphatemia    - phos wnl at 3.3 today  - replace per electrolyte protocol        Hypokalemia    - K at 3.8 today  - replace per protocol        CINV (chemotherapy-induced nausea and vomiting)    - completed olanzapine daily x 6 days  - prn Zofran and Ativan, Zofran stopped due to QTc prolongation  - Compazine IV q6hr PRN   - no recent emesis  - meds converted back to po and tolerating without difficulty        Hypomagnesemia    - Mag 1.5 today  --mag oxide 400mg bid restarted  - replace per protocol            MCTD (mixed connective tissue disease)    - MCTD-NOS  - previously on MTX and Prednisone.   - off therapy now and otherwise doing well.   - can follow up outpatient with her Rheumatologist upon discharge.         Pulmonary nodule    - CXR suggestive of 7 mm slightly irregular shaped radiodensity projected over the left upper lung zone which could represent a calcified granuloma but remains indeterminate.  - CT Chest on 5/28: Multiple scattered noncalcified pulmonary granulomas, the largest measuring 8 mm in the lateral segment of the right middle lobe.   - repeat non-contrast chest CT at 6-12 months         Insomnia    - PRN nightly Xanax.         HTN (hypertension)    - history of SVT   - metoprolol tartrate changed on 6/13 to 25 mg bid (hold for  SBP <100) due to hypotension  - cardiology consulted for abnormal echo and lopressor stopped and lisinopril and Toprol XL started  --HTN adequately controlled        Osteoporosis    - takes prolia once every 6 months, last given mid April 2018   - no acute issue         Depression    - no acute issue   - continue home Escitalopram             VTE Risk Mitigation         Ordered     heparin, porcine (PF) 100 unit/mL injection flush 300 Units  As needed (PRN)      06/15/18 1657     Place MAYLIN hose  Until discontinued      05/25/18 0032     IP VTE HIGH RISK PATIENT  Once      05/25/18 0032          Disposition: pending count recovery    Sweetie Lepe, NP  Bone Marrow Transplant  [facility]

## 2018-07-09 NOTE — PLAN OF CARE
Problem: Patient Care Overview  Goal: Plan of Care Review  Outcome: Ongoing (interventions implemented as appropriate)  Plan of care reviewed with the patient at the beginning of the shift. Pt admitted for newly diagnosed AML. She is s/p 7&3 and Day 25 of FLAG-JENNIFER. She is awaiting count recovery. Nausea and diarrhea have improved. Anxiety and nausea managed with xanax which was given once. No emesis or bm tonight. Small bland meals encouraged. Pt reports better PO intake. She does still have some overall fatigue and complaints of feeling tired. She is up independently. Fall precautions maintained. Pt remained free from falls and injury this shift. Bed locked in lowest position, side rails up x2, call light within reach. Instructed pt to call for assistance as needed. Vitals stable. Pt afebrile. Neutropenic precautions maintained. No acute issues overnight. Will continue to monitor.

## 2018-07-09 NOTE — ASSESSMENT & PLAN NOTE
- history of SVT   - metoprolol tartrate changed on 6/13 to 25 mg bid (hold for SBP <100) due to hypotension  - cardiology consulted for abnormal echo and lopressor stopped and lisinopril and Toprol XL started  --HTN adequately controlled

## 2018-07-09 NOTE — ASSESSMENT & PLAN NOTE
- completed olanzapine daily x 6 days  - prn Zofran and Ativan, Zofran stopped due to QTc prolongation  - Compazine IV q6hr PRN   - no recent emesis  - meds converted back to po and tolerating without difficulty

## 2018-07-09 NOTE — ASSESSMENT & PLAN NOTE
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 Ordered routine labs in addition to FLT3, NGS, and AML FISH.  - lo res HLA typing on 5/26/18 and hi res on 5/27/18 done in anticipation of possible need for future transplant   - Pt with two daughters, two full sisters (in their mid 60s, one with brain aneurysm hx, other one without any medical issues), and one full brother (61 y/o healthy).  - ECHO ordered 5/25/18, EF 65%  - PICC line placed 5/25/18   - initially on hydrea, discontinued on 5/28.   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  - started on induction therapy on 5/29/18.  - Day 42 of 7+3.    - allopurinol stopped 6/11  - prophylactic antimicrobials: Acyclovir, Voriconazole  - will need CNS Prophylaxis given her elevated WBC (high risk) on admission after count recovery.  - NPM1 +, CEBPA (-), FLT3 (+).  On Midostaurin 50 mg PO BID until Day 14 (held starting 6/8 to 6/11). Stopped when FLAG JENNIFER re-induction started. Restarted Midostaurin at 25 mg bid on day 8 of FLAG JENNIFER induction (6/22), increase to full dose 50 mg bid (6/26) as improvement in liver enzymes. Stopped 7/3/18 due to abnormal cardiac echo.  - day 14 bone marrow biopsy completed 6/11 showing persistent disease with 15% CD 34 positive blasts  - Day 25 of FLAG-JENNIFER, tolerating without difficulty except nausea/vomiting  - repeat 2D echo on 6/15 with preserved EF of 60%.  However 7/2/18 echo with reduced EF 30-35%  - day 14 restaging bone marrow biopsy done 6/29 without complication, results with no evidence of AML, LFT 3 negative, will need repeat marrow at count recovery  - awaiting count recovery for safe discharge home

## 2018-07-09 NOTE — SUBJECTIVE & OBJECTIVE
Subjective:     Interval History:   Day 25 FLAG JENNIFER, , continues Neupogen. Received platelets today. Afebrile, VSS. Tolerating all oral meds with no nausea or vomiting. Only complains of fatigue    Objective:     Vital Signs (Most Recent):  Temp: 97 °F (36.1 °C) (07/09/18 1134)  Pulse: 87 (07/09/18 1134)  Resp: 18 (07/09/18 1134)  BP: 127/61 (07/09/18 1134)  SpO2: 99 % (07/09/18 1134) Vital Signs (24h Range):  Temp:  [96.9 °F (36.1 °C)-98.3 °F (36.8 °C)] 97 °F (36.1 °C)  Pulse:  [80-98] 87  Resp:  [16-19] 18  SpO2:  [95 %-99 %] 99 %  BP: (110-128)/(55-62) 127/61     Weight: 63.3 kg (139 lb 7.1 oz)  Body mass index is 24.7 kg/m².  Body surface area is 1.68 meters squared.      Intake/Output - Last 3 Shifts       07/07 0700 - 07/08 0659 07/08 0700 - 07/09 0659 07/09 0700 - 07/10 0659    P.O. 900 1500     I.V. (mL/kg) 100 (1.6)      Blood  500     Total Intake(mL/kg) 1000 (15.7) 2000 (31.6)     Urine (mL/kg/hr) 1475 (1) 450 (0.3) 325 (1.1)    Total Output 1475 450 325    Net -475 +1550 -325           Urine Occurrence 1 x 2 x     Stool Occurrence 1 x 2 x           Physical Exam   Constitutional: She is oriented to person, place, and time. She appears well-developed and well-nourished. No distress.   HENT:   Mouth/Throat: Oropharynx is clear and moist. No oropharyngeal exudate or posterior oropharyngeal erythema.   Eyes: Conjunctivae are normal. Pupils are equal, round, and reactive to light. No scleral icterus.   Neck: Normal range of motion. Neck supple. No thyromegaly present.   Cardiovascular: Normal rate, regular rhythm, normal heart sounds and intact distal pulses.    Pulmonary/Chest: Effort normal and breath sounds normal. No respiratory distress.   Abdominal: Soft. Bowel sounds are normal. She exhibits no distension. There is no splenomegaly or hepatomegaly. There is no tenderness.   Musculoskeletal: Normal range of motion. She exhibits no edema or tenderness.   Neurological: She is alert and oriented to  person, place, and time. No cranial nerve deficit. Coordination normal.   Skin: No rash noted. No cyanosis. No pallor. Nails show no clubbing.   PICC intact R arm no redness or drainage     Psychiatric: She has a normal mood and affect. Thought content normal.   Vitals reviewed.      Significant Labs:   CBC:   Recent Labs  Lab 07/08/18 0429 07/09/18  0400   WBC 0.11* 0.13*   HGB 8.0* 7.4*   HCT 23.3* 21.6*   PLT 14* 9*   , CMP:   Recent Labs  Lab 07/08/18 0429 07/09/18  0400    137   K 4.0 3.8    103   CO2 24 24    96   BUN 9 10   CREATININE 0.6 0.6   CALCIUM 9.0 9.1   PROT 6.4 6.4   ALBUMIN 2.9* 2.9*   BILITOT 0.8 0.7   ALKPHOS 296* 290*   AST 10 9*   ALT <5* 5*   ANIONGAP 9 10   EGFRNONAA >60.0 >60.0    and Coagulation: No results for input(s): PT, INR, APTT in the last 48 hours.    Diagnostic Results:  None

## 2018-07-09 NOTE — ASSESSMENT & PLAN NOTE
- secondary to chemotherapy.   - WBC 0.13 and , on Neupogen per FLAG JENNIFER protocol  - hgb 7.4 and platelet count 9,000 today  - tranfuse if Hb < 7 g/dL and Plt < 10,000/mm3 or active bleed.  - transfuse 1 unit of platelets today

## 2018-07-09 NOTE — ASSESSMENT & PLAN NOTE
AST and ALT have normalized. Alk phos downtrend continues, 290 today.   -   - midostaurin started day 8 at 25 mg bid, monitoring liver enzymes closely and improved. Increasing Midostaurin to 50 mg bid 6/26. Stopped Midostaurin (7/3) due to new diagnosis of systolic heart failure EF 35%.  - 6/22/18 liver US showing mild intrahepatic dilation, otherwise impression of hepatic steatosis.  Will hold off of MRCP at this time.

## 2018-07-10 LAB
ALBUMIN SERPL BCP-MCNC: 3.1 G/DL
ALP SERPL-CCNC: 405 U/L
ALT SERPL W/O P-5'-P-CCNC: 18 U/L
ANION GAP SERPL CALC-SCNC: 10 MMOL/L
ANISOCYTOSIS BLD QL SMEAR: SLIGHT
AST SERPL-CCNC: 55 U/L
BASO STIPL BLD QL SMEAR: ABNORMAL
BASOPHILS # BLD AUTO: ABNORMAL K/UL
BASOPHILS NFR BLD: 2 %
BILIRUB SERPL-MCNC: 0.7 MG/DL
BUN SERPL-MCNC: 13 MG/DL
CALCIUM SERPL-MCNC: 9.4 MG/DL
CHLORIDE SERPL-SCNC: 102 MMOL/L
CO2 SERPL-SCNC: 26 MMOL/L
CREAT SERPL-MCNC: 0.7 MG/DL
DIFFERENTIAL METHOD: ABNORMAL
DOHLE BOD BLD QL SMEAR: PRESENT
EOSINOPHIL # BLD AUTO: ABNORMAL K/UL
EOSINOPHIL NFR BLD: 0 %
ERYTHROCYTE [DISTWIDTH] IN BLOOD BY AUTOMATED COUNT: 13 %
EST. GFR  (AFRICAN AMERICAN): >60 ML/MIN/1.73 M^2
EST. GFR  (NON AFRICAN AMERICAN): >60 ML/MIN/1.73 M^2
GLUCOSE SERPL-MCNC: 105 MG/DL
HCT VFR BLD AUTO: 20.5 %
HGB BLD-MCNC: 7.1 G/DL
IMM GRANULOCYTES # BLD AUTO: ABNORMAL K/UL
IMM GRANULOCYTES NFR BLD AUTO: ABNORMAL %
LYMPHOCYTES # BLD AUTO: ABNORMAL K/UL
LYMPHOCYTES NFR BLD: 2 %
MAGNESIUM SERPL-MCNC: 1.6 MG/DL
MCH RBC QN AUTO: 31 PG
MCHC RBC AUTO-ENTMCNC: 34.6 G/DL
MCV RBC AUTO: 90 FL
MONOCYTES # BLD AUTO: ABNORMAL K/UL
MONOCYTES NFR BLD: 1 %
NEUTROPHILS NFR BLD: 88 %
NEUTS BAND NFR BLD MANUAL: 7 %
NRBC BLD-RTO: 0 /100 WBC
PHOSPHATE SERPL-MCNC: 4.1 MG/DL
PLATELET # BLD AUTO: 32 K/UL
PLATELET BLD QL SMEAR: ABNORMAL
PMV BLD AUTO: 10.5 FL
POLYCHROMASIA BLD QL SMEAR: ABNORMAL
POTASSIUM SERPL-SCNC: 3.7 MMOL/L
PROT SERPL-MCNC: 6.5 G/DL
RBC # BLD AUTO: 2.29 M/UL
SODIUM SERPL-SCNC: 138 MMOL/L
TOXIC GRANULES BLD QL SMEAR: PRESENT
WBC # BLD AUTO: 0.22 K/UL

## 2018-07-10 PROCEDURE — 25000003 PHARM REV CODE 250: Performed by: HOSPITALIST

## 2018-07-10 PROCEDURE — 63600175 PHARM REV CODE 636 W HCPCS: Mod: JG | Performed by: INTERNAL MEDICINE

## 2018-07-10 PROCEDURE — 84100 ASSAY OF PHOSPHORUS: CPT

## 2018-07-10 PROCEDURE — 25000003 PHARM REV CODE 250: Performed by: INTERNAL MEDICINE

## 2018-07-10 PROCEDURE — 83735 ASSAY OF MAGNESIUM: CPT

## 2018-07-10 PROCEDURE — 80053 COMPREHEN METABOLIC PANEL: CPT

## 2018-07-10 PROCEDURE — 25000003 PHARM REV CODE 250: Performed by: NURSE PRACTITIONER

## 2018-07-10 PROCEDURE — 99233 SBSQ HOSP IP/OBS HIGH 50: CPT | Mod: GC,,, | Performed by: INTERNAL MEDICINE

## 2018-07-10 PROCEDURE — 63600175 PHARM REV CODE 636 W HCPCS: Performed by: INTERNAL MEDICINE

## 2018-07-10 PROCEDURE — A4216 STERILE WATER/SALINE, 10 ML: HCPCS | Performed by: INTERNAL MEDICINE

## 2018-07-10 PROCEDURE — 20600001 HC STEP DOWN PRIVATE ROOM

## 2018-07-10 PROCEDURE — 25000003 PHARM REV CODE 250: Performed by: STUDENT IN AN ORGANIZED HEALTH CARE EDUCATION/TRAINING PROGRAM

## 2018-07-10 PROCEDURE — 63600175 PHARM REV CODE 636 W HCPCS: Performed by: STUDENT IN AN ORGANIZED HEALTH CARE EDUCATION/TRAINING PROGRAM

## 2018-07-10 RX ORDER — ONDANSETRON 2 MG/ML
4 INJECTION INTRAMUSCULAR; INTRAVENOUS EVERY 8 HOURS PRN
Status: DISCONTINUED | OUTPATIENT
Start: 2018-07-10 | End: 2018-07-13 | Stop reason: HOSPADM

## 2018-07-10 RX ORDER — ONDANSETRON 2 MG/ML
4 INJECTION INTRAMUSCULAR; INTRAVENOUS EVERY 8 HOURS PRN
Status: DISCONTINUED | OUTPATIENT
Start: 2018-07-10 | End: 2018-07-10

## 2018-07-10 RX ADMIN — Medication 2 MG: at 12:07

## 2018-07-10 RX ADMIN — FUROSEMIDE 20 MG: 20 TABLET ORAL at 09:07

## 2018-07-10 RX ADMIN — POTASSIUM CHLORIDE 20 MEQ: 750 CAPSULE, EXTENDED RELEASE ORAL at 05:07

## 2018-07-10 RX ADMIN — Medication 400 MG: at 09:07

## 2018-07-10 RX ADMIN — CEFPODOXIME PROXETIL 200 MG: 200 TABLET, FILM COATED ORAL at 09:07

## 2018-07-10 RX ADMIN — VORICONAZOLE 200 MG: 200 TABLET ORAL at 09:07

## 2018-07-10 RX ADMIN — MORPHINE SULFATE 15 MG: 15 TABLET ORAL at 11:07

## 2018-07-10 RX ADMIN — ONDANSETRON 4 MG: 2 INJECTION INTRAMUSCULAR; INTRAVENOUS at 04:07

## 2018-07-10 RX ADMIN — PANTOPRAZOLE SODIUM 40 MG: 40 TABLET, DELAYED RELEASE ORAL at 09:07

## 2018-07-10 RX ADMIN — Medication 10 ML: at 12:07

## 2018-07-10 RX ADMIN — METOPROLOL SUCCINATE 25 MG: 25 TABLET, EXTENDED RELEASE ORAL at 09:07

## 2018-07-10 RX ADMIN — FILGRASTIM 480 MCG: 480 INJECTION, SOLUTION INTRAVENOUS; SUBCUTANEOUS at 08:07

## 2018-07-10 RX ADMIN — ESCITALOPRAM 10 MG: 5 TABLET, FILM COATED ORAL at 09:07

## 2018-07-10 RX ADMIN — ACYCLOVIR 400 MG: 200 CAPSULE ORAL at 09:07

## 2018-07-10 RX ADMIN — LISINOPRIL 5 MG: 2.5 TABLET ORAL at 09:07

## 2018-07-10 RX ADMIN — VITAMIN D, TAB 1000IU (100/BT) 5000 UNITS: 25 TAB at 09:07

## 2018-07-10 RX ADMIN — Medication 400 MG: at 05:07

## 2018-07-10 NOTE — ASSESSMENT & PLAN NOTE
- secondary to chemotherapy.   - WBC 0.22 and , on Neupogen per FLAG JENNIFER protocol  - hgb 7.1 and platelet count 32,000 today  - tranfuse if Hb < 7 g/dL and Plt < 10,000/mm3 or active bleed.

## 2018-07-10 NOTE — ASSESSMENT & PLAN NOTE
ALT has normalized, AST 55. Alk phos 405 today.   -   - midostaurin started day 8 at 25 mg bid, monitoring liver enzymes closely and improved. Increasing Midostaurin to 50 mg bid 6/26. Stopped Midostaurin (7/3) due to new diagnosis of systolic heart failure EF 35%.  - 6/22/18 liver US showing mild intrahepatic dilation, otherwise impression of hepatic steatosis.  Will hold off of MRCP at this time.

## 2018-07-10 NOTE — PT/OT/SLP PROGRESS
Physical Therapy      Patient Name:  Noreen Mac   MRN:  [id number]    PT to bedside for follow-up visit. Pt found supine with family member at bedside. Pt denied acute PT needs/concerns at this time. Pt and family report that pt has been ambulating daily with family assist without significant declines in mobility. Pt instructed to contact team if therapy needs arise prior to next PT visit. Pt v/u. Will continue to follow. No billable units this date.     Adrianne Jerez, PT, DPT   7/10/2018  [phone]

## 2018-07-10 NOTE — PLAN OF CARE
Problem: Patient Care Overview  Goal: Plan of Care Review  Outcome: Ongoing (interventions implemented as appropriate)  Plan of care reviewed with the patient at the beginning of the shift. Pt admitted for newly diagnosed AML. She is s/p 7&3 and Day 26 of FLAG-JENNIFER. She is awaiting count recovery. Nausea and diarrhea have improved. Anxiety and nausea managed with xanax which was given once. Emesis x1, compazine given. Small bland meals encouraged. Pt reports better PO intake. She does still have some overall fatigue and complaints of feeling tired. She is up independently. Fall precautions maintained. Pt remained free from falls and injury this shift. Bed locked in lowest position, side rails up x2, call light within reach. Instructed pt to call for assistance as needed. Vitals stable. Pt afebrile. Neutropenic precautions maintained. No acute issues overnight. Will continue to monitor.

## 2018-07-10 NOTE — PLAN OF CARE
MDR's with Dr Troy.  Patient is day 26 of FLAG-Maribel.  Patient is progressing toward d/c.   today.  Planning for possible d/c toward the end of the week.  Patient does not want to establish care with a MD closer to home for interim needs.  Patient wishes to have all d/c labs drawn in [place] after d/c and f/u @ [facility].  Will continue to follow for d/c needs.

## 2018-07-10 NOTE — SUBJECTIVE & OBJECTIVE
Subjective:     Interval History:   Day 26 FLAG JENNIFER,  today. Had 2 episodes of vomiting and diarrhea last night. Feeling better. Afebrile, VSS. No complaints this am.    Objective:     Vital Signs (Most Recent):  Temp: 97.6 °F (36.4 °C) (07/10/18 1210)  Pulse: 81 (07/10/18 1210)  Resp: 16 (07/10/18 1210)  BP: (!) 125/59 (07/10/18 1210)  SpO2: 96 % (07/10/18 1210) Vital Signs (24h Range):  Temp:  [97.4 °F (36.3 °C)-98.1 °F (36.7 °C)] 97.6 °F (36.4 °C)  Pulse:  [79-97] 81  Resp:  [16-18] 16  SpO2:  [94 %-98 %] 96 %  BP: (101-134)/(51-69) 125/59     Weight: 63.7 kg (140 lb 8.7 oz)  Body mass index is 24.9 kg/m².  Body surface area is 1.68 meters squared.      Intake/Output - Last 3 Shifts       07/08 0700 - 07/09 0659 07/09 0700 - 07/10 0659 07/10 0700 - 07/11 0659    P.O. 1500 1180 480    Blood 500      Total Intake(mL/kg) 2000 (31.6) 1180 (18.5) 480 (7.5)    Urine (mL/kg/hr) 450 (0.3) 825 (0.5)     Total Output 450 825      Net +1550 +355 +480           Urine Occurrence 2 x 1 x 3 x    Stool Occurrence 2 x 2 x 2 x    Emesis Occurrence  2 x           Physical Exam   Constitutional: She is oriented to person, place, and time. She appears well-developed and well-nourished. No distress.   HENT:   Mouth/Throat: Oropharynx is clear and moist. No oropharyngeal exudate or posterior oropharyngeal erythema.   Eyes: Conjunctivae are normal. Pupils are equal, round, and reactive to light. No scleral icterus.   Neck: Normal range of motion. Neck supple. No thyromegaly present.   Cardiovascular: Normal rate, regular rhythm, normal heart sounds and intact distal pulses.    Pulmonary/Chest: Effort normal and breath sounds normal. No respiratory distress.   Abdominal: Soft. Bowel sounds are normal. She exhibits no distension. There is no splenomegaly or hepatomegaly. There is no tenderness.   Musculoskeletal: Normal range of motion. She exhibits no edema or tenderness.   Neurological: She is alert and oriented to person,  place, and time. No cranial nerve deficit. Coordination normal.   Skin: No rash noted. No cyanosis. No pallor. Nails show no clubbing.   PICC intact R arm no redness or drainage     Psychiatric: She has a normal mood and affect. Thought content normal.   Vitals reviewed.      Significant Labs:   CBC:   Recent Labs  Lab 07/09/18  0400 07/10/18  0400   WBC 0.13* 0.22*   HGB 7.4* 7.1*   HCT 21.6* 20.5*   PLT 9* 32*    and CMP:   Recent Labs  Lab 07/09/18  0400 07/10/18  0400    138   K 3.8 3.7    102   CO2 24 26   GLU 96 105   BUN 10 13   CREATININE 0.6 0.7   CALCIUM 9.1 9.4   PROT 6.4 6.5   ALBUMIN 2.9* 3.1*   BILITOT 0.7 0.7   ALKPHOS 290* 405*   AST 9* 55*   ALT 5* 18   ANIONGAP 10 10   EGFRNONAA >60.0 >60.0       Diagnostic Results:  I have reviewed all pertinent imaging results/findings within the past 24 hours.  None

## 2018-07-10 NOTE — ASSESSMENT & PLAN NOTE
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 Ordered routine labs in addition to FLT3, NGS, and AML FISH.  - lo res HLA typing on 5/26/18 and hi res on 5/27/18 done in anticipation of possible need for future transplant   - Pt with two daughters, two full sisters (in their mid 60s, one with brain aneurysm hx, other one without any medical issues), and one full brother (59 y/o healthy).  - ECHO ordered 5/25/18, EF 65%  - PICC line placed 5/25/18   - initially on hydrea, discontinued on 5/28.   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  - started on induction therapy on 5/29/18.  - Day 43 of 7+3.    - allopurinol stopped 6/11  - prophylactic antimicrobials: Acyclovir, Voriconazole  - will need CNS Prophylaxis given her elevated WBC (high risk) on admission after count recovery.  - NPM1 +, CEBPA (-), FLT3 (+).  On Midostaurin 50 mg PO BID until Day 14 (held starting 6/8 to 6/11). Stopped when FLAG JENNIFER re-induction started. Restarted Midostaurin at 25 mg bid on day 8 of FLAG JENNIFER induction (6/22), increase to full dose 50 mg bid (6/26) as improvement in liver enzymes. Stopped 7/3/18 due to abnormal cardiac echo.  - day 14 bone marrow biopsy completed 6/11 showing persistent disease with 15% CD 34 positive blasts  - Day 26 of FLAG-JENNIFER, tolerating without difficulty except nausea/vomiting  - repeat 2D echo on 6/15 with preserved EF of 60%.  However 7/2/18 echo with reduced EF 30-35%  - day 14 restaging bone marrow biopsy done 6/29 without complication, results with no evidence of AML, LFT 3 negative, will need repeat marrow at count recovery  - awaiting count recovery for safe discharge home,  today

## 2018-07-10 NOTE — PLAN OF CARE
"Problem: Patient Care Overview  Goal: Plan of Care Review  Outcome: Outcome(s) achieved Date Met: 07/10/18  Patient up in chair this morning. Ambulating in room as well. Reports having back pain. Patient with flat affect. Patient with poor appetite. Drinks water. Afebrile, no falls this shift. Patient with bruising noted to extremities. Patient remains neutropenic and thrombocytopenic. Waiting on count recovery for patient to be discharged. States she is "soooo ready to go home".       "

## 2018-07-10 NOTE — PROGRESS NOTES
[facility]  Hematology  Bone Marrow Transplant  Progress Note    Patient Name: Noreen Mac  Admission Date: 5/24/2018  Hospital Length of Stay: 47 days  Code Status: Full Code    Subjective:     Interval History:   Day 26 FLAG JENNIFER,  today. Had 2 episodes of vomiting and diarrhea last night. Feeling better. Afebrile, VSS. No complaints this am.    Objective:     Vital Signs (Most Recent):  Temp: 97.6 °F (36.4 °C) (07/10/18 1210)  Pulse: 81 (07/10/18 1210)  Resp: 16 (07/10/18 1210)  BP: (!) 125/59 (07/10/18 1210)  SpO2: 96 % (07/10/18 1210) Vital Signs (24h Range):  Temp:  [97.4 °F (36.3 °C)-98.1 °F (36.7 °C)] 97.6 °F (36.4 °C)  Pulse:  [79-97] 81  Resp:  [16-18] 16  SpO2:  [94 %-98 %] 96 %  BP: (101-134)/(51-69) 125/59     Weight: 63.7 kg (140 lb 8.7 oz)  Body mass index is 24.9 kg/m².  Body surface area is 1.68 meters squared.      Intake/Output - Last 3 Shifts       07/08 0700 - 07/09 0659 07/09 0700 - 07/10 0659 07/10 0700 - 07/11 0659    P.O. 1500 1180 480    Blood 500      Total Intake(mL/kg) 2000 (31.6) 1180 (18.5) 480 (7.5)    Urine (mL/kg/hr) 450 (0.3) 825 (0.5)     Total Output 450 825      Net +1550 +355 +480           Urine Occurrence 2 x 1 x 3 x    Stool Occurrence 2 x 2 x 2 x    Emesis Occurrence  2 x           Physical Exam   Constitutional: She is oriented to person, place, and time. She appears well-developed and well-nourished. No distress.   HENT:   Mouth/Throat: Oropharynx is clear and moist. No oropharyngeal exudate or posterior oropharyngeal erythema.   Eyes: Conjunctivae are normal. Pupils are equal, round, and reactive to light. No scleral icterus.   Neck: Normal range of motion. Neck supple. No thyromegaly present.   Cardiovascular: Normal rate, regular rhythm, normal heart sounds and intact distal pulses.    Pulmonary/Chest: Effort normal and breath sounds normal. No respiratory distress.   Abdominal: Soft. Bowel sounds are normal. She exhibits no distension.  There is no splenomegaly or hepatomegaly. There is no tenderness.   Musculoskeletal: Normal range of motion. She exhibits no edema or tenderness.   Neurological: She is alert and oriented to person, place, and time. No cranial nerve deficit. Coordination normal.   Skin: No rash noted. No cyanosis. No pallor. Nails show no clubbing.   PICC intact R arm no redness or drainage     Psychiatric: She has a normal mood and affect. Thought content normal.   Vitals reviewed.      Significant Labs:   CBC:   Recent Labs  Lab 07/09/18  0400 07/10/18  0400   WBC 0.13* 0.22*   HGB 7.4* 7.1*   HCT 21.6* 20.5*   PLT 9* 32*    and CMP:   Recent Labs  Lab 07/09/18  0400 07/10/18  0400    138   K 3.8 3.7    102   CO2 24 26   GLU 96 105   BUN 10 13   CREATININE 0.6 0.7   CALCIUM 9.1 9.4   PROT 6.4 6.5   ALBUMIN 2.9* 3.1*   BILITOT 0.7 0.7   ALKPHOS 290* 405*   AST 9* 55*   ALT 5* 18   ANIONGAP 10 10   EGFRNONAA >60.0 >60.0       Diagnostic Results:  I have reviewed all pertinent imaging results/findings within the past 24 hours.  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 Ordered routine labs in addition to FLT3, NGS, and AML FISH.  - lo res HLA typing on 5/26/18 and hi res on 5/27/18 done in anticipation of possible need for future transplant   - Pt with two daughters, two full sisters (in their mid 60s, one with brain aneurysm hx, other one without any medical issues), and one full brother (61 y/o healthy).  - ECHO ordered 5/25/18, EF 65%  - PICC line placed 5/25/18   - initially on hydrea, discontinued on 5/28.   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  - started on induction therapy on 5/29/18.  - Day 43 of 7+3.    - allopurinol stopped 6/11  - prophylactic  antimicrobials: Acyclovir, Voriconazole  - will need CNS Prophylaxis given her elevated WBC (high risk) on admission after count recovery.  - NPM1 +, CEBPA (-), FLT3 (+).  On Midostaurin 50 mg PO BID until Day 14 (held starting 6/8 to 6/11). Stopped when FLAG JENNIFER re-induction started. Restarted Midostaurin at 25 mg bid on day 8 of FLAG JENNIFER induction (6/22), increase to full dose 50 mg bid (6/26) as improvement in liver enzymes. Stopped 7/3/18 due to abnormal cardiac echo.  - day 14 bone marrow biopsy completed 6/11 showing persistent disease with 15% CD 34 positive blasts  - Day 26 of FLAG-JENNIFER, tolerating without difficulty except nausea/vomiting  - repeat 2D echo on 6/15 with preserved EF of 60%.  However 7/2/18 echo with reduced EF 30-35%  - day 14 restaging bone marrow biopsy done 6/29 without complication, results with no evidence of AML, LFT 3 negative, will need repeat marrow at count recovery  - awaiting count recovery for safe discharge home,  today          Staphylococcus epidermidis bacteremia    - blood cultures from 6/11 at 0430 with gram + cocci, staph epidermis sensitive for Vancomycin, CXR and UA unremarkable.   - surveillance blood cultures repeated 6/13 NGTD  - de-escalated aztreonam back to prophylactic cipro 6/16  - ID consulted on 6/19, rec continue Vanc for 2 weeks post last negative culture (EOT 6/27)  - BC from 6/11 with gram negative rods, leptotrichia goodfellowii  - completed Flagyl po x 5 days (EOT 6/24)  - ID has signed off  - patient remains afebrile        Pancytopenia    - secondary to chemotherapy.   - WBC 0.22 and , on Neupogen per FLAG JENNIFER protocol  - hgb 7.1 and platelet count 32,000 today  - tranfuse if Hb < 7 g/dL and Plt < 10,000/mm3 or active bleed.                Abnormal liver enzymes    ALT has normalized, AST 55. Alk phos 405 today.   -   - midostaurin started day 8 at 25 mg bid, monitoring liver enzymes closely and improved. Increasing Midostaurin to  50 mg bid 6/26. Stopped Midostaurin (7/3) due to new diagnosis of systolic heart failure EF 35%.  - 6/22/18 liver US showing mild intrahepatic dilation, otherwise impression of hepatic steatosis.  Will hold off of MRCP at this time.          Chemotherapy induced cardiomyopathy    - sob and O2 requirements--resolved  - chest x-ray with pulmonary edema  - 2 D echo from 7/2 with EF of 30-35%, pulmonary HTN and severe L atrial enlargement  - cardiology following, appreciate recs  - Lopressor was discontinued.  Started Lisinopril and Toprol XL and po lasix on 7/3  - cardiology follow-up as outpatient in 3 months        Hypophosphatemia    - phos wnl at 4.1 today  - replace per electrolyte protocol        Hypokalemia    - K at 3.7 today  - replace per protocol        CINV (chemotherapy-induced nausea and vomiting)    - completed olanzapine daily x 6 days  - prn Zofran and Ativan, Zofran stopped due to QTc prolongation  - Compazine IV q6hr PRN   - emesis x 2 overnight, improved with ginger ale  - meds converted back to po and tolerating without difficulty        Hypomagnesemia    - Mag 1.6 today  --mag oxide 400mg bid restarted  - replace per protocol            MCTD (mixed connective tissue disease)    - MCTD-NOS  - previously on MTX and Prednisone.   - off therapy now and otherwise doing well.   - can follow up outpatient with her Rheumatologist upon discharge.         Pulmonary nodule    - CXR suggestive of 7 mm slightly irregular shaped radiodensity projected over the left upper lung zone which could represent a calcified granuloma but remains indeterminate.  - CT Chest on 5/28: Multiple scattered noncalcified pulmonary granulomas, the largest measuring 8 mm in the lateral segment of the right middle lobe.   - repeat non-contrast chest CT at 6-12 months         Insomnia    - PRN nightly Xanax.         HTN (hypertension)    - history of SVT   - metoprolol tartrate changed on 6/13 to 25 mg bid (hold for SBP <100) due to  hypotension  - cardiology consulted for abnormal echo and lopressor stopped and lisinopril and Toprol XL started  --HTN adequately controlled        Osteoporosis    - takes prolia once every 6 months, last given mid April 2018   - no acute issue         Depression    - no acute issue   - continue home Escitalopram             VTE Risk Mitigation         Ordered     heparin, porcine (PF) 100 unit/mL injection flush 300 Units  As needed (PRN)      06/15/18 1657     Place MAYLIN hose  Until discontinued      05/25/18 0032     IP VTE HIGH RISK PATIENT  Once      05/25/18 0032          Disposition: pending count recovery    Sweetie Lepe, NP  Bone Marrow Transplant  [facility]

## 2018-07-10 NOTE — ASSESSMENT & PLAN NOTE
- completed olanzapine daily x 6 days  - prn Zofran and Ativan, Zofran stopped due to QTc prolongation  - Compazine IV q6hr PRN   - emesis x 2 overnight, improved with ginger ale  - meds converted back to po and tolerating without difficulty

## 2018-07-11 PROBLEM — M54.9 ACUTE BACK PAIN: Status: ACTIVE | Noted: 2018-07-11

## 2018-07-11 LAB
ALBUMIN SERPL BCP-MCNC: 3.1 G/DL
ALP SERPL-CCNC: 453 U/L
ALT SERPL W/O P-5'-P-CCNC: 17 U/L
ANION GAP SERPL CALC-SCNC: 9 MMOL/L
ANISOCYTOSIS BLD QL SMEAR: SLIGHT
AST SERPL-CCNC: 28 U/L
BASOPHILS # BLD AUTO: ABNORMAL K/UL
BASOPHILS NFR BLD: 0 %
BILIRUB SERPL-MCNC: 0.5 MG/DL
BLD PROD TYP BPU: NORMAL
BLOOD UNIT EXPIRATION DATE: NORMAL
BLOOD UNIT TYPE CODE: 6200
BLOOD UNIT TYPE: NORMAL
BUN SERPL-MCNC: 13 MG/DL
CALCIUM SERPL-MCNC: 9.2 MG/DL
CHLORIDE SERPL-SCNC: 101 MMOL/L
CO2 SERPL-SCNC: 27 MMOL/L
CODING SYSTEM: NORMAL
CREAT SERPL-MCNC: 0.7 MG/DL
DIFFERENTIAL METHOD: ABNORMAL
DISPENSE STATUS: NORMAL
EOSINOPHIL # BLD AUTO: ABNORMAL K/UL
EOSINOPHIL NFR BLD: 0 %
ERYTHROCYTE [DISTWIDTH] IN BLOOD BY AUTOMATED COUNT: 13.2 %
EST. GFR  (AFRICAN AMERICAN): >60 ML/MIN/1.73 M^2
EST. GFR  (NON AFRICAN AMERICAN): >60 ML/MIN/1.73 M^2
GLUCOSE SERPL-MCNC: 113 MG/DL
HCT VFR BLD AUTO: 20.7 %
HGB BLD-MCNC: 6.9 G/DL
IMM GRANULOCYTES # BLD AUTO: ABNORMAL K/UL
IMM GRANULOCYTES NFR BLD AUTO: ABNORMAL %
LYMPHOCYTES # BLD AUTO: ABNORMAL K/UL
LYMPHOCYTES NFR BLD: 10 %
MAGNESIUM SERPL-MCNC: 1.5 MG/DL
MCH RBC QN AUTO: 30.3 PG
MCHC RBC AUTO-ENTMCNC: 33.3 G/DL
MCV RBC AUTO: 91 FL
MONOCYTES # BLD AUTO: ABNORMAL K/UL
MONOCYTES NFR BLD: 4 %
NEUTROPHILS NFR BLD: 86 %
NRBC BLD-RTO: 0 /100 WBC
NUM UNITS TRANS PACKED RBC: NORMAL
PHOSPHATE SERPL-MCNC: 3.7 MG/DL
PLATELET # BLD AUTO: 22 K/UL
PLATELET BLD QL SMEAR: ABNORMAL
PMV BLD AUTO: 11.7 FL
POTASSIUM SERPL-SCNC: 3.8 MMOL/L
PROT SERPL-MCNC: 6.4 G/DL
RBC # BLD AUTO: 2.28 M/UL
SODIUM SERPL-SCNC: 137 MMOL/L
WBC # BLD AUTO: 0.29 K/UL

## 2018-07-11 PROCEDURE — 25000003 PHARM REV CODE 250: Performed by: STUDENT IN AN ORGANIZED HEALTH CARE EDUCATION/TRAINING PROGRAM

## 2018-07-11 PROCEDURE — P9040 RBC LEUKOREDUCED IRRADIATED: HCPCS

## 2018-07-11 PROCEDURE — 25000003 PHARM REV CODE 250: Performed by: INTERNAL MEDICINE

## 2018-07-11 PROCEDURE — 63600175 PHARM REV CODE 636 W HCPCS: Performed by: INTERNAL MEDICINE

## 2018-07-11 PROCEDURE — 63600175 PHARM REV CODE 636 W HCPCS: Mod: JG | Performed by: INTERNAL MEDICINE

## 2018-07-11 PROCEDURE — 83735 ASSAY OF MAGNESIUM: CPT

## 2018-07-11 PROCEDURE — 80053 COMPREHEN METABOLIC PANEL: CPT

## 2018-07-11 PROCEDURE — 20600001 HC STEP DOWN PRIVATE ROOM

## 2018-07-11 PROCEDURE — A4216 STERILE WATER/SALINE, 10 ML: HCPCS | Performed by: INTERNAL MEDICINE

## 2018-07-11 PROCEDURE — 25000003 PHARM REV CODE 250: Performed by: HOSPITALIST

## 2018-07-11 PROCEDURE — 25000003 PHARM REV CODE 250: Performed by: NURSE PRACTITIONER

## 2018-07-11 PROCEDURE — 85007 BL SMEAR W/DIFF WBC COUNT: CPT

## 2018-07-11 PROCEDURE — 84100 ASSAY OF PHOSPHORUS: CPT

## 2018-07-11 PROCEDURE — 85027 COMPLETE CBC AUTOMATED: CPT

## 2018-07-11 PROCEDURE — 36430 TRANSFUSION BLD/BLD COMPNT: CPT

## 2018-07-11 PROCEDURE — 99233 SBSQ HOSP IP/OBS HIGH 50: CPT | Mod: GC,,, | Performed by: INTERNAL MEDICINE

## 2018-07-11 PROCEDURE — 63600175 PHARM REV CODE 636 W HCPCS: Performed by: NURSE PRACTITIONER

## 2018-07-11 RX ORDER — HYDROCODONE BITARTRATE AND ACETAMINOPHEN 500; 5 MG/1; MG/1
TABLET ORAL
Status: DISCONTINUED | OUTPATIENT
Start: 2018-07-11 | End: 2018-07-12

## 2018-07-11 RX ORDER — CETIRIZINE HYDROCHLORIDE 5 MG/1
10 TABLET ORAL DAILY
Status: DISCONTINUED | OUTPATIENT
Start: 2018-07-11 | End: 2018-07-13 | Stop reason: HOSPADM

## 2018-07-11 RX ORDER — LANOLIN ALCOHOL/MO/W.PET/CERES
800 CREAM (GRAM) TOPICAL 2 TIMES DAILY
Status: DISCONTINUED | OUTPATIENT
Start: 2018-07-11 | End: 2018-07-13 | Stop reason: HOSPADM

## 2018-07-11 RX ADMIN — MAGNESIUM OXIDE TAB 400 MG (241.3 MG ELEMENTAL MG) 800 MG: 400 (241.3 MG) TAB at 09:07

## 2018-07-11 RX ADMIN — FILGRASTIM 480 MCG: 480 INJECTION, SOLUTION INTRAVENOUS; SUBCUTANEOUS at 08:07

## 2018-07-11 RX ADMIN — CETIRIZINE HYDROCHLORIDE 10 MG: 5 TABLET, FILM COATED ORAL at 10:07

## 2018-07-11 RX ADMIN — VORICONAZOLE 200 MG: 200 TABLET ORAL at 08:07

## 2018-07-11 RX ADMIN — CEFPODOXIME PROXETIL 200 MG: 200 TABLET, FILM COATED ORAL at 08:07

## 2018-07-11 RX ADMIN — Medication 2 MG: at 10:07

## 2018-07-11 RX ADMIN — MAGNESIUM OXIDE TAB 400 MG (241.3 MG ELEMENTAL MG) 800 MG: 400 (241.3 MG) TAB at 08:07

## 2018-07-11 RX ADMIN — Medication 10 ML: at 12:07

## 2018-07-11 RX ADMIN — METOPROLOL SUCCINATE 25 MG: 25 TABLET, EXTENDED RELEASE ORAL at 09:07

## 2018-07-11 RX ADMIN — VITAMIN D, TAB 1000IU (100/BT) 5000 UNITS: 25 TAB at 09:07

## 2018-07-11 RX ADMIN — CEFPODOXIME PROXETIL 200 MG: 200 TABLET, FILM COATED ORAL at 09:07

## 2018-07-11 RX ADMIN — ESCITALOPRAM 10 MG: 5 TABLET, FILM COATED ORAL at 09:07

## 2018-07-11 RX ADMIN — PANTOPRAZOLE SODIUM 40 MG: 40 TABLET, DELAYED RELEASE ORAL at 09:07

## 2018-07-11 RX ADMIN — PROCHLORPERAZINE EDISYLATE 10 MG: 5 INJECTION INTRAMUSCULAR; INTRAVENOUS at 09:07

## 2018-07-11 RX ADMIN — ACYCLOVIR 400 MG: 200 CAPSULE ORAL at 08:07

## 2018-07-11 RX ADMIN — ONDANSETRON 4 MG: 2 INJECTION, SOLUTION INTRAMUSCULAR; INTRAVENOUS at 07:07

## 2018-07-11 RX ADMIN — Medication 10 ML: at 06:07

## 2018-07-11 RX ADMIN — Medication 2 MG: at 07:07

## 2018-07-11 RX ADMIN — ACYCLOVIR 400 MG: 200 CAPSULE ORAL at 09:07

## 2018-07-11 RX ADMIN — VORICONAZOLE 200 MG: 200 TABLET ORAL at 09:07

## 2018-07-11 RX ADMIN — MORPHINE SULFATE 15 MG: 15 TABLET ORAL at 09:07

## 2018-07-11 RX ADMIN — FUROSEMIDE 20 MG: 20 TABLET ORAL at 09:07

## 2018-07-11 RX ADMIN — DIPHENHYDRAMINE HYDROCHLORIDE 12.5 MG: 50 INJECTION, SOLUTION INTRAMUSCULAR; INTRAVENOUS at 10:07

## 2018-07-11 RX ADMIN — ALPRAZOLAM 0.25 MG: 0.25 TABLET ORAL at 08:07

## 2018-07-11 RX ADMIN — ACETAMINOPHEN 650 MG: 325 TABLET, FILM COATED ORAL at 10:07

## 2018-07-11 NOTE — PROGRESS NOTES
[facility]  Hematology  Bone Marrow Transplant  Progress Note    Patient Name: Noreen Mac  Admission Date: 5/24/2018  Hospital Length of Stay: 48 days  Code Status: Full Code    Subjective:     Interval History: Day 27 FLAG JENNIFER,  today. No vomiting or diarrhea overnight and no nausea. Afebrile, VSS.    Objective:     Vital Signs (Most Recent):  Temp: 98.1 °F (36.7 °C) (07/11/18 0753)  Pulse: 75 (07/11/18 0753)  Resp: 16 (07/11/18 0753)  BP: 111/63 (07/11/18 0753)  SpO2: 97 % (07/11/18 0753) Vital Signs (24h Range):  Temp:  [97.4 °F (36.3 °C)-98.3 °F (36.8 °C)] 98.1 °F (36.7 °C)  Pulse:  [75-89] 75  Resp:  [16-18] 16  SpO2:  [95 %-97 %] 97 %  BP: ()/(51-63) 111/63     Weight: 64 kg (140 lb 15.8 oz)  Body mass index is 24.97 kg/m².  Body surface area is 1.69 meters squared.      Intake/Output - Last 3 Shifts       07/09 0700 - 07/10 0659 07/10 0700 - 07/11 0659 07/11 0700 - 07/12 0659    P.O. 1180 840     Total Intake(mL/kg) 1180 (18.5) 840 (13.1)     Urine (mL/kg/hr) 825 (0.5) 475 (0.3)     Total Output 825 475      Net +355 +365             Urine Occurrence 1 x 3 x     Stool Occurrence 2 x 2 x     Emesis Occurrence 2 x            Physical Exam   Constitutional: She is oriented to person, place, and time. She appears well-developed and well-nourished. No distress.   HENT:   Mouth/Throat: Oropharynx is clear and moist. No oropharyngeal exudate or posterior oropharyngeal erythema.   Eyes: Conjunctivae are normal. Pupils are equal, round, and reactive to light. No scleral icterus.   Neck: Normal range of motion. Neck supple. No thyromegaly present.   Cardiovascular: Normal rate, regular rhythm, normal heart sounds and intact distal pulses.    Pulmonary/Chest: Effort normal and breath sounds normal. No respiratory distress.   Abdominal: Soft. Bowel sounds are normal. She exhibits no distension. There is no splenomegaly or hepatomegaly. There is no tenderness.   Musculoskeletal:  Normal range of motion. She exhibits no edema or tenderness.   Neurological: She is alert and oriented to person, place, and time. No cranial nerve deficit. Coordination normal.   Skin: No rash noted. No cyanosis. No pallor. Nails show no clubbing.   PICC intact R arm no redness or drainage  Large blue resolving bruise to right calf     Psychiatric: She has a normal mood and affect. Thought content normal.   Vitals reviewed.      Significant Labs:   CBC:   Recent Labs  Lab 07/10/18  0400 07/11/18  0411   WBC 0.22* 0.29*   HGB 7.1* 6.9*   HCT 20.5* 20.7*   PLT 32* 22*   , CMP:   Recent Labs  Lab 07/10/18  0400 07/11/18  0411    137   K 3.7 3.8    101   CO2 26 27    113*   BUN 13 13   CREATININE 0.7 0.7   CALCIUM 9.4 9.2   PROT 6.5 6.4   ALBUMIN 3.1* 3.1*   BILITOT 0.7 0.5   ALKPHOS 405* 453*   AST 55* 28   ALT 18 17   ANIONGAP 10 9   EGFRNONAA >60.0 >60.0   , Coagulation: No results for input(s): PT, INR, APTT in the last 48 hours., LDH: No results for input(s): LDHCSF, BFSOURCE in the last 48 hours. and Uric Acid No results for input(s): URICACID in the last 48 hours.    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 Ordered routine labs in addition to FLT3, NGS, and AML FISH.  - lo res HLA typing on 5/26/18 and hi res on 5/27/18 done in anticipation of possible need for future transplant   - Pt with two daughters, two full sisters (in their mid 60s, one with brain aneurysm hx, other one without any medical issues), and one full brother (59 y/o healthy).  - ECHO ordered 5/25/18, EF 65%  - PICC line placed 5/25/18   - initially on hydrea, discontinued on 5/28.   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  - started on induction  therapy on 5/29/18.  - Day 44 of 7+3.    - allopurinol stopped 6/11  - prophylactic antimicrobials: Acyclovir, Voriconazole  - will need CNS Prophylaxis given her elevated WBC (high risk) on admission after count recovery, IT chemo in fluoro scheduled for 7/13.  - NPM1 +, CEBPA (-), FLT3 (+).  On Midostaurin 50 mg PO BID until Day 14 (held starting 6/8 to 6/11). Stopped when FLAG JENNIFER re-induction started. Restarted Midostaurin at 25 mg bid on day 8 of FLAG JENNIFER induction (6/22), increase to full dose 50 mg bid (6/26) as improvement in liver enzymes. Stopped 7/3/18 due to abnormal cardiac echo.  - day 14 bone marrow biopsy completed 6/11 showing persistent disease with 15% CD 34 positive blasts  - Day 27 of FLAG-JENNIFER, tolerating without difficulty except nausea/vomiting  - repeat 2D echo on 6/15 with preserved EF of 60%.  However 7/2/18 echo with reduced EF 30-35%  - day 14 restaging bone marrow biopsy done 6/29 without complication, results with no evidence of AML, LFT 3 negative, will need repeat marrow at count recovery  - awaiting count recovery for safe discharge home,  today          Staphylococcus epidermidis bacteremia    - blood cultures from 6/11 at 0430 with gram + cocci, staph epidermis sensitive for Vancomycin, CXR and UA unremarkable.   - surveillance blood cultures repeated 6/13 NGTD  - de-escalated aztreonam back to prophylactic cipro 6/16  - ID consulted on 6/19, rec continue Vanc for 2 weeks post last negative culture (EOT 6/27)  - BC from 6/11 with gram negative rods, leptotrichia goodfellowii  - completed Flagyl po x 5 days (EOT 6/24)  - ID has signed off  - patient remains afebrile        Pancytopenia    - secondary to chemotherapy.   - WBC 0.29 and , on Neupogen per FLAG JENN[facility]ER protocol  - hgb 6.9 and platelet count 22,000 today  - tranfuse if Hb < 7 g/dL and Plt < 10,000/mm3 or active bleed.  - will transfuse 1 unit of PRBC today                Abnormal liver enzymes    ALT has  normalized, AST 55. Alk phos 452 today.   -   - midostaurin started day 8 at 25 mg bid, monitoring liver enzymes closely and improved. Increasing Midostaurin to 50 mg bid 6/26. Stopped Midostaurin (7/3) due to new diagnosis of systolic heart failure EF 35%.  - 6/22/18 liver US showing mild intrahepatic dilation, otherwise impression of hepatic steatosis.  Will hold off of MRCP at this time.          Chemotherapy induced cardiomyopathy    - sob and O2 requirements--resolved  - chest x-ray with pulmonary edema  - 2 D echo from 7/2 with EF of 30-35%, pulmonary HTN and severe L atrial enlargement  - cardiology following, appreciate recs  - Lopressor was discontinued.  Started Lisinopril and Toprol XL and po lasix on 7/3  - cardiology follow-up as outpatient in 3 months        Hypophosphatemia    - phos wnl at 3.7 today  - replace per electrolyte protocol        Hypokalemia    - K at 3.8 today  - replace per protocol        CINV (chemotherapy-induced nausea and vomiting)    - completed olanzapine daily x 6 days  - prn Zofran and Ativan, Zofran stopped due to QTc prolongation  - Compazine IV q6hr PRN   - no nausea or vomiting overnight  - meds converted back to po and tolerating without difficulty        Hypomagnesemia    - Mag 1.5 today  --mag oxide 400mg bid, will increase to 800mg bid  - replace per protocol            MCTD (mixed connective tissue disease)    - MCTD-NOS  - previously on MTX and Prednisone.   - off therapy now and otherwise doing well.   - can follow up outpatient with her Rheumatologist upon discharge.         Pulmonary nodule    - CXR suggestive of 7 mm slightly irregular shaped radiodensity projected over the left upper lung zone which could represent a calcified granuloma but remains indeterminate.  - CT Chest on 5/28: Multiple scattered noncalcified pulmonary granulomas, the largest measuring 8 mm in the lateral segment of the right middle lobe.   - repeat non-contrast chest CT at 6-12  months         Insomnia    - PRN nightly Xanax.         HTN (hypertension)    - history of SVT   - metoprolol tartrate changed on 6/13 to 25 mg bid (hold for SBP <100) due to hypotension  - cardiology consulted for abnormal echo and lopressor stopped and lisinopril and Toprol XL started  --HTN adequately controlled        Osteoporosis    - takes prolia once every 6 months, last given mid April 2018   - no acute issue         Depression    - no acute issue   - continue home Escitalopram             VTE Risk Mitigation         Ordered     heparin, porcine (PF) 100 unit/mL injection flush 300 Units  As needed (PRN)      06/15/18 1657     Place MAYLIN hose  Until discontinued      05/25/18 0032     IP VTE HIGH RISK PATIENT  Once      05/25/18 0032          Disposition: awaiting count recovery    Sweetie Lepe NP  Bone Marrow Transplant  [facility]

## 2018-07-11 NOTE — ASSESSMENT & PLAN NOTE
- Admitted from [facility] on 5/25/18 early AM with WBC around 100s, for 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 Ordered routine labs in addition to FLT3, NGS, and AML FISH.  - lo res HLA typing on 5/26/18 and hi res on 5/27/18 done in anticipation of possible need for future transplant   - Pt with two daughters, two full sisters (in their mid 60s, one with brain aneurysm hx, other one without any medical issues), and one full brother (61 y/o healthy).  - ECHO ordered 5/25/18, EF 65%  - PICC line placed 5/25/18   - initially on hydrea, discontinued on 5/28.   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  - started on induction therapy on 5/29/18.  - Day 44 of 7+3.    - allopurinol stopped 6/11  - prophylactic antimicrobials: Acyclovir, Voriconazole  - will need CNS Prophylaxis given her elevated WBC (high risk) on admission after count recovery, IT chemo in fluoro scheduled for 7/13.  - NPM1 +, CEBPA (-), FLT3 (+).  On Midostaurin 50 mg PO BID until Day 14 (held starting 6/8 to 6/11). Stopped when FLAG JENNIFER re-induction started. Restarted Midostaurin at 25 mg bid on day 8 of FLAG JENNIFER induction (6/22), increase to full dose 50 mg bid (6/26) as improvement in liver enzymes. Stopped 7/3/18 due to abnormal cardiac echo.  - day 14 bone marrow biopsy completed 6/11 showing persistent disease with 15% CD 34 positive blasts  - Day 27 of FLAG-JENNIFER, tolerating without difficulty except nausea/vomiting  - repeat 2D echo on 6/15 with preserved EF of 60%.  However 7/2/18 echo with reduced EF 30-35%  - day 14 restaging bone marrow biopsy done 6/29 without complication, results with no evidence of AML, LFT 3 negative, will need repeat marrow at count recovery  - awaiting count recovery for safe discharge home,  today

## 2018-07-11 NOTE — PROGRESS NOTES
[facility]  Hematology  Bone Marrow Transplant  Progress Note    Patient Name: Noreen Mac  Admission Date: 5/24/2018  Hospital Length of Stay: 48 days  Code Status: Full Code    Subjective:     Interval History: Day 27 FLAG JENNIFER,  today. No vomiting or diarrhea overnight and no nausea. Afebrile, VSS. Complains of back pain (bone pain) suspect due to count recovery. Relieved with oxy IR and Zyrtec started.    Objective:     Vital Signs (Most Recent):  Temp: 98.1 °F (36.7 °C) (07/11/18 0753)  Pulse: 75 (07/11/18 0753)  Resp: 16 (07/11/18 0753)  BP: 111/63 (07/11/18 0753)  SpO2: 97 % (07/11/18 0753) Vital Signs (24h Range):  Temp:  [97.4 °F (36.3 °C)-98.3 °F (36.8 °C)] 98.1 °F (36.7 °C)  Pulse:  [75-89] 75  Resp:  [16-18] 16  SpO2:  [95 %-97 %] 97 %  BP: ()/(51-63) 111/63     Weight: 64 kg (140 lb 15.8 oz)  Body mass index is 24.97 kg/m².  Body surface area is 1.69 meters squared.      Intake/Output - Last 3 Shifts       07/09 0700 - 07/10 0659 07/10 0700 - 07/11 0659 07/11 0700 - 07/12 0659    P.O. 1180 840     Total Intake(mL/kg) 1180 (18.5) 840 (13.1)     Urine (mL/kg/hr) 825 (0.5) 475 (0.3)     Total Output 825 475      Net +355 +365             Urine Occurrence 1 x 3 x     Stool Occurrence 2 x 2 x     Emesis Occurrence 2 x            Physical Exam   Constitutional: She is oriented to person, place, and time. She appears well-developed and well-nourished. No distress.   HENT:   Mouth/Throat: Oropharynx is clear and moist. No oropharyngeal exudate or posterior oropharyngeal erythema.   Eyes: Conjunctivae are normal. Pupils are equal, round, and reactive to light. No scleral icterus.   Neck: Normal range of motion. Neck supple. No thyromegaly present.   Cardiovascular: Normal rate, regular rhythm, normal heart sounds and intact distal pulses.    Pulmonary/Chest: Effort normal and breath sounds normal. No respiratory distress.   Abdominal: Soft. Bowel sounds are normal. She  exhibits no distension. There is no splenomegaly or hepatomegaly. There is no tenderness.   Musculoskeletal: Normal range of motion. She exhibits no edema or tenderness.   Neurological: She is alert and oriented to person, place, and time. No cranial nerve deficit. Coordination normal.   Skin: No rash noted. No cyanosis. No pallor. Nails show no clubbing.   PICC intact R arm no redness or drainage  Large blue resolving bruise to right calf     Psychiatric: She has a normal mood and affect. Thought content normal.   Vitals reviewed.      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 Ordered routine labs in addition to FLT3, NGS, and AML FISH.  - lo res HLA typing on 5/26/18 and hi res on 5/27/18 done in anticipation of possible need for future transplant   - Pt with two daughters, two full sisters (in their mid 60s, one with brain aneurysm hx, other one without any medical issues), and one full brother (59 y/o healthy).  - ECHO ordered 5/25/18, EF 65%  - PICC line placed 5/25/18   - initially on hydrea, discontinued on 5/28.   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  - started on induction therapy on 5/29/18.  - Day 44 of 7+3.    - allopurinol stopped 6/11  - prophylactic antimicrobials:  Acyclovir, Voriconazole  - will need CNS Prophylaxis given her elevated WBC (high risk) on admission after count recovery, IT chemo in fluoro scheduled for 7/13.  - NPM1 +, CEBPA (-), FLT3 (+).  On Midostaurin 50 mg PO BID until Day 14 (held starting 6/8 to 6/11). Stopped when FLAG JENNIFER re-induction started. Restarted Midostaurin at 25 mg bid on day 8 of FLAG JENNIFER induction (6/22), increase to full dose 50 mg bid (6/26) as improvement in liver enzymes. Stopped 7/3/18 due to abnormal cardiac echo.  - day 14 bone marrow biopsy completed 6/11 showing persistent disease with 15% CD 34 positive blasts  - Day 27 of FLAG-JENNIFER, tolerating without difficulty except nausea/vomiting  - repeat 2D echo on 6/15 with preserved EF of 60%.  However 7/2/18 echo with reduced EF 30-35%  - day 14 restaging bone marrow biopsy done 6/29 without complication, results with no evidence of AML, LFT 3 negative, will need repeat marrow at count recovery  - awaiting count recovery for safe discharge home,  today          Staphylococcus epidermidis bacteremia    - blood cultures from 6/11 at 0430 with gram + cocci, staph epidermis sensitive for Vancomycin, CXR and UA unremarkable.   - surveillance blood cultures repeated 6/13 NGTD  - de-escalated aztreonam back to prophylactic cipro 6/16  - ID consulted on 6/19, rec continue Vanc for 2 weeks post last negative culture (EOT 6/27)  - BC from 6/11 with gram negative rods, leptotrichia goodfellowii  - completed Flagyl po x 5 days (EOT 6/24)  - ID has signed off  - patient remains afebrile        Pancytopenia    - secondary to chemotherapy.   - WBC 0.29 and , on Neupogen per FLAG JENNIFER protocol  - hgb 6.9 and platelet count 22,000 today  - tranfuse if Hb < 7 g/dL and Plt < 10,000/mm3 or active bleed.  - will transfuse 1 unit of PRBC today                Abnormal liver enzymes    ALT has normalized, AST 55. Alk phos 452 today.   -   - midostaurin started day 8 at 25 mg bid, monitoring  liver enzymes closely and improved. Increasing Midostaurin to 50 mg bid 6/26. Stopped Midostaurin (7/3) due to new diagnosis of systolic heart failure EF 35%.  - 6/22/18 liver US showing mild intrahepatic dilation, otherwise impression of hepatic steatosis.  Will hold off of MRCP at this time.          Acute back pain    - likely due to bone marrow recovery  - continue Oxy PRN  - will start Zyrtec today        Chemotherapy induced cardiomyopathy    - sob and O2 requirements--resolved  - chest x-ray with pulmonary edema  - 2 D echo from 7/2 with EF of 30-35%, pulmonary HTN and severe L atrial enlargement  - cardiology following, appreciate recs  - Lopressor was discontinued.  Started Lisinopril and Toprol XL and po lasix on 7/3  - cardiology follow-up as outpatient in 3 months        Hypophosphatemia    - phos wnl at 3.7 today  - replace per electrolyte protocol        Hypokalemia    - K at 3.8 today  - replace per protocol        CINV (chemotherapy-induced nausea and vomiting)    - completed olanzapine daily x 6 days  - prn Zofran and Ativan, Zofran stopped due to QTc prolongation  - Compazine IV q6hr PRN   - no nausea or vomiting overnight  - meds converted back to po and tolerating without difficulty        Hypomagnesemia    - Mag 1.5 today  --mag oxide 400mg bid, will increase to 800mg bid  - replace per protocol            MCTD (mixed connective tissue disease)    - MCTD-NOS  - previously on MTX and Prednisone.   - off therapy now and otherwise doing well.   - can follow up outpatient with her Rheumatologist upon discharge.         Pulmonary nodule    - CXR suggestive of 7 mm slightly irregular shaped radiodensity projected over the left upper lung zone which could represent a calcified granuloma but remains indeterminate.  - CT Chest on 5/28: Multiple scattered noncalcified pulmonary granulomas, the largest measuring 8 mm in the lateral segment of the right middle lobe.   - repeat non-contrast chest CT at 6-12  months         Insomnia    - PRN nightly Xanax.         HTN (hypertension)    - history of SVT   - metoprolol tartrate changed on 6/13 to 25 mg bid (hold for SBP <100) due to hypotension  - cardiology consulted for abnormal echo and lopressor stopped and lisinopril and Toprol XL started  --HTN adequately controlled        Osteoporosis    - takes prolia once every 6 months, last given mid April 2018   - no acute issue         Depression    - no acute issue   - continue home Escitalopram             VTE Risk Mitigation         Ordered     heparin, porcine (PF) 100 unit/mL injection flush 300 Units  As needed (PRN)      06/15/18 1657     Place MAYLIN hose  Until discontinued      05/25/18 0032     IP VTE HIGH RISK PATIENT  Once      05/25/18 0032          Disposition: pending count recovery    Sweetie Lepe NP  Bone Marrow Transplant  [facility]

## 2018-07-11 NOTE — ASSESSMENT & PLAN NOTE
ALT has normalized, AST 55. Alk phos 452 today.   -   - midostaurin started day 8 at 25 mg bid, monitoring liver enzymes closely and improved. Increasing Midostaurin to 50 mg bid 6/26. Stopped Midostaurin (7/3) due to new diagnosis of systolic heart failure EF 35%.  - 6/22/18 liver US showing mild intrahepatic dilation, otherwise impression of hepatic steatosis.  Will hold off of MRCP at this time.

## 2018-07-11 NOTE — ASSESSMENT & PLAN NOTE
- secondary to chemotherapy.   - WBC 0.29 and , on Neupogen per FLAG JENNIFER protocol  - hgb 6.9 and platelet count 22,000 today  - tranfuse if Hb < 7 g/dL and Plt < 10,000/mm3 or active bleed.  - will transfuse 1 unit of PRBC today

## 2018-07-11 NOTE — PROGRESS NOTES
" [facility]  Adult Nutrition  Progress Note    SUMMARY       Recommendations    Recommendation/Intervention:   1. Continue w/ Regular diet. Encourage HVP w/ each meal  2.Encourage PO intake >/= 75% EEN/EPN   3. 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 she is receiving outside foods daily. Smoothies, loaded potatoes, dinner meals. pt  had wt loss of 25 lbs in  1 mo. but has remained stable over last wk w/ the additional outside foods. reminded  pt about food safety concerns s/p SCT. Pt states she is drinking a lot of water. denies boost 2/2 taste. Pt day 27 FLAG-JENNIFER  Nutrition Discharge Planning: Regular diet w/ po intake 75% EEN/ EPN + ONS as needed to maintain elevated Kcal/ Protein needs    Nutrition Risk Screen    Nutrition Risk Screen: no indicators present    Nutrition/Diet History    Patient Reported Diet/Restrictions/Preferences: general  Food Preferences: none  Do you have any cultural, spiritual, Anabaptism conflicts, given your current situation?: none noted   Food Allergies: NKFA  Factors Affecting Nutritional Intake: decreased appetite    Anthropometrics    Temp: 98.4 °F (36.9 °C)  Height Method: Stated  Height: 5' 3" (160 cm)  Height (inches): 63 in  Weight Method: Standard Scale  Weight: 64 kg (140 lb 15.8 oz)  Weight (lb): 140.99 lb  Ideal Body Weight (IBW), Female: 115 lb  % Ideal Body Weight, Female (lb): 122.6 lb  BMI (Calculated): 25  BMI Grade: 25 - 29.9 - overweight  Usual Body Weight (UBW), k.8 kg (per chart review - " office visit 5/24/18)  % Usual Body Weight: 86.86  % Weight Change From Usual Weight: -13.33 %       Lab/Procedures/Meds    Pertinent Labs Reviewed: reviewed  Pertinent Labs Comments: WBC-0.29, H/H-6.9/20.7, Plts-22, Glu-113, Alb-3.1, Mg-1.5  Pertinent Medications Reviewed: reviewed  Pertinent Medications Comments: lasix, pantoprazole, Vitamin D    Physical Findings/Assessment    Overall Physical Appearance: weak, loss of subcutaneous fat  Tubes: other (see comments)  Oral/Mouth Cavity: WDL  Skin: edema    Estimated/Assessed Needs    Weight Used For Calorie Calculations: 64 kg (141 lb 1.5 oz)  Energy Calorie Requirements (kcal): 6636-8588 kcal/d  Energy Need Method: Kcal/kg (30-35kcal/kg)  Protein Requirements: 96-115g/d (1.5-1.8g/kg)  Weight Used For Protein Calculations: 64 kg (141 lb 1.5 oz)  Fluid Requirements (mL): 1ml per KCal or Per MD  Fluid Need Method: RDA Method  RDA Method (mL): 1920  CHO Requirement: NA      Nutrition Prescription Ordered    Current Diet Order: regular  Oral Nutrition Supplement: denied 2/2 taste    Evaluation of Received Nutrient/Fluid Intake    IV Fluid (mL): 0  I/O: -3.5 llSince admit  Energy Calories Required: not meeting needs  Protein Required: not meeting needs  Fluid Required: meeting needs  Comments: 7/10/118  % Intake of Estimated Energy Needs: 25 - 50 %  % Meal Intake: 25 - 50 %    Nutrition Risk    Level of Risk/Frequency of Follow-up: low (f/u 1 x wk)     Assessment and Plan           Nutrition Problem  increased nutrient needs     Related to (etiology):   Physiological needs 2/2 AML      Signs and Symptoms (as evidenced by):   Pt receiving 7+3 regimen/ FLAG-JENNIFER day 27     Interventions/Recommendations (treatment strategy):       Nutrition Diagnosis Status:   Continues         Monitor and Evaluation    Food and Nutrient Intake: energy intake, food and beverage intake  Food and Nutrient Adminstration: diet order  Physical Activity and Function: nutrition-related ADLs and  IADLs  Anthropometric Measurements: weight, weight change  Biochemical Data, Medical Tests and Procedures:  (LBM: 5/31/18)  Nutrition-Focused Physical Findings: overall appearance     Nutrition Follow-Up    RD Follow-up?: Yes

## 2018-07-11 NOTE — SUBJECTIVE & OBJECTIVE
Subjective:     Interval History: Day 27 FLAG JENNIFER,  today. No vomiting or diarrhea overnight and no nausea. Afebrile, VSS. Complains of back pain (bone pain) suspect due to count recovery. Relieved with oxy IR and Zyrtec started.    Objective:     Vital Signs (Most Recent):  Temp: 98.1 °F (36.7 °C) (07/11/18 0753)  Pulse: 75 (07/11/18 0753)  Resp: 16 (07/11/18 0753)  BP: 111/63 (07/11/18 0753)  SpO2: 97 % (07/11/18 0753) Vital Signs (24h Range):  Temp:  [97.4 °F (36.3 °C)-98.3 °F (36.8 °C)] 98.1 °F (36.7 °C)  Pulse:  [75-89] 75  Resp:  [16-18] 16  SpO2:  [95 %-97 %] 97 %  BP: ()/(51-63) 111/63     Weight: 64 kg (140 lb 15.8 oz)  Body mass index is 24.97 kg/m².  Body surface area is 1.69 meters squared.      Intake/Output - Last 3 Shifts       07/09 0700 - 07/10 0659 07/10 0700 - 07/11 0659 07/11 0700 - 07/12 0659    P.O. 1180 840     Total Intake(mL/kg) 1180 (18.5) 840 (13.1)     Urine (mL/kg/hr) 825 (0.5) 475 (0.3)     Total Output 825 475      Net +355 +365             Urine Occurrence 1 x 3 x     Stool Occurrence 2 x 2 x     Emesis Occurrence 2 x            Physical Exam   Constitutional: She is oriented to person, place, and time. She appears well-developed and well-nourished. No distress.   HENT:   Mouth/Throat: Oropharynx is clear and moist. No oropharyngeal exudate or posterior oropharyngeal erythema.   Eyes: Conjunctivae are normal. Pupils are equal, round, and reactive to light. No scleral icterus.   Neck: Normal range of motion. Neck supple. No thyromegaly present.   Cardiovascular: Normal rate, regular rhythm, normal heart sounds and intact distal pulses.    Pulmonary/Chest: Effort normal and breath sounds normal. No respiratory distress.   Abdominal: Soft. Bowel sounds are normal. She exhibits no distension. There is no splenomegaly or hepatomegaly. There is no tenderness.   Musculoskeletal: Normal range of motion. She exhibits no edema or tenderness.   Neurological: She is alert and  oriented to person, place, and time. No cranial nerve deficit. Coordination normal.   Skin: No rash noted. No cyanosis. No pallor. Nails show no clubbing.   PICC intact R arm no redness or drainage  Large blue resolving bruise to right calf     Psychiatric: She has a normal mood and affect. Thought content normal.   Vitals reviewed.      Significant Labs:   CBC:   Recent Labs  Lab 07/10/18  0400 07/11/18  0411   WBC 0.22* 0.29*   HGB 7.1* 6.9*   HCT 20.5* 20.7*   PLT 32* 22*    and CMP:   Recent Labs  Lab 07/10/18  0400 07/11/18  0411    137   K 3.7 3.8    101   CO2 26 27    113*   BUN 13 13   CREATININE 0.7 0.7   CALCIUM 9.4 9.2   PROT 6.5 6.4   ALBUMIN 3.1* 3.1*   BILITOT 0.7 0.5   ALKPHOS 405* 453*   AST 55* 28   ALT 18 17   ANIONGAP 10 9   EGFRNONAA >60.0 >60.0       Diagnostic Results:  None

## 2018-07-11 NOTE — ASSESSMENT & PLAN NOTE
- completed olanzapine daily x 6 days  - prn Zofran and Ativan, Zofran stopped due to QTc prolongation  - Compazine IV q6hr PRN   - no nausea or vomiting overnight  - meds converted back to po and tolerating without difficulty

## 2018-07-11 NOTE — ASSESSMENT & PLAN NOTE
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 Ordered routine labs in addition to FLT3, NGS, and AML FISH.  - lo res HLA typing on 5/26/18 and hi res on 5/27/18 done in anticipation of possible need for future transplant   - Pt with two daughters, two full sisters (in their mid 60s, one with brain aneurysm hx, other one without any medical issues), and one full brother (59 y/o healthy).  - ECHO ordered 5/25/18, EF 65%  - PICC line placed 5/25/18   - initially on hydrea, discontinued on 5/28.   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  - started on induction therapy on 5/29/18.  - Day 44 of 7+3.    - allopurinol stopped 6/11  - prophylactic antimicrobials: Acyclovir, Voriconazole  - will need CNS Prophylaxis given her elevated WBC (high risk) on admission after count recovery, IT chemo in fluoro scheduled for 7/13.  - NPM1 +, CEBPA (-), FLT3 (+).  On Midostaurin 50 mg PO BID until Day 14 (held starting 6/8 to 6/11). Stopped when FLAG JENNIFER re-induction started. Restarted Midostaurin at 25 mg bid on day 8 of FLAG JENNIFER induction (6/22), increase to full dose 50 mg bid (6/26) as improvement in liver enzymes. Stopped 7/3/18 due to abnormal cardiac echo.  - day 14 bone marrow biopsy completed 6/11 showing persistent disease with 15% CD 34 positive blasts  - Day 27 of FLAG-JENNIFER, tolerating without difficulty except nausea/vomiting  - repeat 2D echo on 6/15 with preserved EF of 60%.  However 7/2/18 echo with reduced EF 30-35%  - day 14 restaging bone marrow biopsy done 6/29 without complication, results with no evidence of AML, LFT 3 negative, will need repeat marrow at count recovery  - awaiting count recovery for safe discharge home,  today

## 2018-07-11 NOTE — SUBJECTIVE & OBJECTIVE
Subjective:     Interval History: Day 27 FLAG JENNIFER,  today. No vomiting or diarrhea overnight and no nausea. Afebrile, VSS.    Objective:     Vital Signs (Most Recent):  Temp: 98.1 °F (36.7 °C) (07/11/18 0753)  Pulse: 75 (07/11/18 0753)  Resp: 16 (07/11/18 0753)  BP: 111/63 (07/11/18 0753)  SpO2: 97 % (07/11/18 0753) Vital Signs (24h Range):  Temp:  [97.4 °F (36.3 °C)-98.3 °F (36.8 °C)] 98.1 °F (36.7 °C)  Pulse:  [75-89] 75  Resp:  [16-18] 16  SpO2:  [95 %-97 %] 97 %  BP: ()/(51-63) 111/63     Weight: 64 kg (140 lb 15.8 oz)  Body mass index is 24.97 kg/m².  Body surface area is 1.69 meters squared.      Intake/Output - Last 3 Shifts       07/09 0700 - 07/10 0659 07/10 0700 - 07/11 0659 07/11 0700 - 07/12 0659    P.O. 1180 840     Total Intake(mL/kg) 1180 (18.5) 840 (13.1)     Urine (mL/kg/hr) 825 (0.5) 475 (0.3)     Total Output 825 475      Net +355 +365             Urine Occurrence 1 x 3 x     Stool Occurrence 2 x 2 x     Emesis Occurrence 2 x            Physical Exam   Constitutional: She is oriented to person, place, and time. She appears well-developed and well-nourished. No distress.   HENT:   Mouth/Throat: Oropharynx is clear and moist. No oropharyngeal exudate or posterior oropharyngeal erythema.   Eyes: Conjunctivae are normal. Pupils are equal, round, and reactive to light. No scleral icterus.   Neck: Normal range of motion. Neck supple. No thyromegaly present.   Cardiovascular: Normal rate, regular rhythm, normal heart sounds and intact distal pulses.    Pulmonary/Chest: Effort normal and breath sounds normal. No respiratory distress.   Abdominal: Soft. Bowel sounds are normal. She exhibits no distension. There is no splenomegaly or hepatomegaly. There is no tenderness.   Musculoskeletal: Normal range of motion. She exhibits no edema or tenderness.   Neurological: She is alert and oriented to person, place, and time. No cranial nerve deficit. Coordination normal.   Skin: No rash noted. No  cyanosis. No pallor. Nails show no clubbing.   PICC intact R arm no redness or drainage  Large blue resolving bruise to right calf     Psychiatric: She has a normal mood and affect. Thought content normal.   Vitals reviewed.      Significant Labs:   CBC:   Recent Labs  Lab 07/10/18  0400 07/11/18 0411   WBC 0.22* 0.29*   HGB 7.1* 6.9*   HCT 20.5* 20.7*   PLT 32* 22*   , CMP:   Recent Labs  Lab 07/10/18  0400 07/11/18 0411    137   K 3.7 3.8    101   CO2 26 27    113*   BUN 13 13   CREATININE 0.7 0.7   CALCIUM 9.4 9.2   PROT 6.5 6.4   ALBUMIN 3.1* 3.1*   BILITOT 0.7 0.5   ALKPHOS 405* 453*   AST 55* 28   ALT 18 17   ANIONGAP 10 9   EGFRNONAA >60.0 >60.0   , Coagulation: No results for input(s): PT, INR, APTT in the last 48 hours., LDH: No results for input(s): LDHCSF, BFSOURCE in the last 48 hours. and Uric Acid No results for input(s): URICACID in the last 48 hours.    Diagnostic Results:  None

## 2018-07-11 NOTE — PLAN OF CARE
Problem: Patient Care Overview  Goal: Individualization & Mutuality  Outcome: Ongoing (interventions implemented as appropriate)  Pt AAOx4; independent. Vital signs stable and pt remained afebrile throughout shift. C/O severe back pain this morning; pt was given 2 doses IV morphine, 1 dose PO morphine, and started on cetirizine for bone pain. This afternoon, pt is resting comfortably in bed and pain seems well controlled. Pt receiving PO cefpodoximel. PO magnesium given as ordered. 1 unit RBCs ordered and administered without issue.  Pt remained free from falls during shift.  Bed lowest position and locked.  Call light in reach.  [facility]

## 2018-07-12 PROBLEM — E87.6 HYPOKALEMIA: Status: RESOLVED | Noted: 2018-06-22 | Resolved: 2018-07-12

## 2018-07-12 PROBLEM — E83.39 HYPOPHOSPHATEMIA: Status: RESOLVED | Noted: 2018-06-22 | Resolved: 2018-07-12

## 2018-07-12 LAB
ABO + RH BLD: NORMAL
ALBUMIN SERPL BCP-MCNC: 3 G/DL
ALP SERPL-CCNC: 473 U/L
ALT SERPL W/O P-5'-P-CCNC: 14 U/L
ANION GAP SERPL CALC-SCNC: 10 MMOL/L
ANISOCYTOSIS BLD QL SMEAR: SLIGHT
AST SERPL-CCNC: 19 U/L
BASOPHILS # BLD AUTO: ABNORMAL K/UL
BASOPHILS NFR BLD: 0 %
BILIRUB SERPL-MCNC: 0.6 MG/DL
BLD GP AB SCN CELLS X3 SERPL QL: NORMAL
BUN SERPL-MCNC: 9 MG/DL
CALCIUM SERPL-MCNC: 9.9 MG/DL
CHLORIDE SERPL-SCNC: 102 MMOL/L
CO2 SERPL-SCNC: 28 MMOL/L
CREAT SERPL-MCNC: 0.7 MG/DL
DIFFERENTIAL METHOD: ABNORMAL
EOSINOPHIL # BLD AUTO: ABNORMAL K/UL
EOSINOPHIL NFR BLD: 0 %
ERYTHROCYTE [DISTWIDTH] IN BLOOD BY AUTOMATED COUNT: 13.2 %
EST. GFR  (AFRICAN AMERICAN): >60 ML/MIN/1.73 M^2
EST. GFR  (NON AFRICAN AMERICAN): >60 ML/MIN/1.73 M^2
GLUCOSE SERPL-MCNC: 93 MG/DL
HCT VFR BLD AUTO: 23.4 %
HGB BLD-MCNC: 8.1 G/DL
IMM GRANULOCYTES # BLD AUTO: ABNORMAL K/UL
IMM GRANULOCYTES NFR BLD AUTO: ABNORMAL %
LYMPHOCYTES # BLD AUTO: ABNORMAL K/UL
LYMPHOCYTES NFR BLD: 5 %
MAGNESIUM SERPL-MCNC: 1.7 MG/DL
MCH RBC QN AUTO: 30.7 PG
MCHC RBC AUTO-ENTMCNC: 34.6 G/DL
MCV RBC AUTO: 89 FL
MONOCYTES # BLD AUTO: ABNORMAL K/UL
MONOCYTES NFR BLD: 1 %
NEUTROPHILS NFR BLD: 89 %
NEUTS BAND NFR BLD MANUAL: 5 %
NRBC BLD-RTO: 0 /100 WBC
OVALOCYTES BLD QL SMEAR: ABNORMAL
PHOSPHATE SERPL-MCNC: 3.9 MG/DL
PLATELET # BLD AUTO: 15 K/UL
PLATELET BLD QL SMEAR: ABNORMAL
PMV BLD AUTO: 10.7 FL
POIKILOCYTOSIS BLD QL SMEAR: SLIGHT
POLYCHROMASIA BLD QL SMEAR: ABNORMAL
POTASSIUM SERPL-SCNC: 4.3 MMOL/L
PROT SERPL-MCNC: 6.2 G/DL
RBC # BLD AUTO: 2.64 M/UL
SODIUM SERPL-SCNC: 140 MMOL/L
WBC # BLD AUTO: 0.82 K/UL

## 2018-07-12 PROCEDURE — 25000003 PHARM REV CODE 250: Performed by: INTERNAL MEDICINE

## 2018-07-12 PROCEDURE — 25000003 PHARM REV CODE 250: Performed by: HOSPITALIST

## 2018-07-12 PROCEDURE — 63600175 PHARM REV CODE 636 W HCPCS: Mod: JG | Performed by: INTERNAL MEDICINE

## 2018-07-12 PROCEDURE — 20600001 HC STEP DOWN PRIVATE ROOM

## 2018-07-12 PROCEDURE — 85007 BL SMEAR W/DIFF WBC COUNT: CPT

## 2018-07-12 PROCEDURE — 63600175 PHARM REV CODE 636 W HCPCS: Performed by: NURSE PRACTITIONER

## 2018-07-12 PROCEDURE — 25000003 PHARM REV CODE 250: Performed by: NURSE PRACTITIONER

## 2018-07-12 PROCEDURE — 83735 ASSAY OF MAGNESIUM: CPT

## 2018-07-12 PROCEDURE — 25000003 PHARM REV CODE 250: Performed by: STUDENT IN AN ORGANIZED HEALTH CARE EDUCATION/TRAINING PROGRAM

## 2018-07-12 PROCEDURE — 99233 SBSQ HOSP IP/OBS HIGH 50: CPT | Mod: GC,,, | Performed by: INTERNAL MEDICINE

## 2018-07-12 PROCEDURE — 85027 COMPLETE CBC AUTOMATED: CPT

## 2018-07-12 PROCEDURE — 84100 ASSAY OF PHOSPHORUS: CPT

## 2018-07-12 PROCEDURE — 63600175 PHARM REV CODE 636 W HCPCS: Performed by: INTERNAL MEDICINE

## 2018-07-12 PROCEDURE — 86901 BLOOD TYPING SEROLOGIC RH(D): CPT

## 2018-07-12 PROCEDURE — 80053 COMPREHEN METABOLIC PANEL: CPT

## 2018-07-12 RX ADMIN — MAGNESIUM OXIDE TAB 400 MG (241.3 MG ELEMENTAL MG) 800 MG: 400 (241.3 MG) TAB at 08:07

## 2018-07-12 RX ADMIN — ACYCLOVIR 400 MG: 200 CAPSULE ORAL at 08:07

## 2018-07-12 RX ADMIN — ESCITALOPRAM 10 MG: 5 TABLET, FILM COATED ORAL at 08:07

## 2018-07-12 RX ADMIN — FILGRASTIM 480 MCG: 480 INJECTION, SOLUTION INTRAVENOUS; SUBCUTANEOUS at 06:07

## 2018-07-12 RX ADMIN — MAGNESIUM OXIDE TAB 400 MG (241.3 MG ELEMENTAL MG) 400 MG: 400 (241.3 MG) TAB at 10:07

## 2018-07-12 RX ADMIN — ONDANSETRON 4 MG: 2 INJECTION, SOLUTION INTRAMUSCULAR; INTRAVENOUS at 09:07

## 2018-07-12 RX ADMIN — VITAMIN D, TAB 1000IU (100/BT) 5000 UNITS: 25 TAB at 08:07

## 2018-07-12 RX ADMIN — FUROSEMIDE 20 MG: 20 TABLET ORAL at 08:07

## 2018-07-12 RX ADMIN — METOPROLOL SUCCINATE 25 MG: 25 TABLET, EXTENDED RELEASE ORAL at 08:07

## 2018-07-12 RX ADMIN — CEFPODOXIME PROXETIL 200 MG: 200 TABLET, FILM COATED ORAL at 08:07

## 2018-07-12 RX ADMIN — ALPRAZOLAM 0.25 MG: 0.25 TABLET ORAL at 06:07

## 2018-07-12 RX ADMIN — PANTOPRAZOLE SODIUM 40 MG: 40 TABLET, DELAYED RELEASE ORAL at 08:07

## 2018-07-12 RX ADMIN — VORICONAZOLE 200 MG: 200 TABLET ORAL at 08:07

## 2018-07-12 RX ADMIN — MAGNESIUM OXIDE TAB 400 MG (241.3 MG ELEMENTAL MG) 400 MG: 400 (241.3 MG) TAB at 06:07

## 2018-07-12 RX ADMIN — ALPRAZOLAM 0.25 MG: 0.25 TABLET ORAL at 08:07

## 2018-07-12 RX ADMIN — LISINOPRIL 5 MG: 2.5 TABLET ORAL at 08:07

## 2018-07-12 RX ADMIN — MORPHINE SULFATE 15 MG: 15 TABLET ORAL at 12:07

## 2018-07-12 RX ADMIN — Medication 2 MG: at 09:07

## 2018-07-12 RX ADMIN — CETIRIZINE HYDROCHLORIDE 10 MG: 5 TABLET, FILM COATED ORAL at 08:07

## 2018-07-12 NOTE — ASSESSMENT & PLAN NOTE
- history of SVT   - metoprolol tartrate changed on 6/13 to 25 mg bid (hold for SBP <100) due to hypotension  - cardiology consulted for abnormal echo and lopressor stopped and lisinopril and Toprol XL started  - HTN adequately controlled

## 2018-07-12 NOTE — ASSESSMENT & PLAN NOTE
- blood cultures from 6/11 at 0430 with gram + cocci, staph epidermis sensitive for Vancomycin, CXR and UA unremarkable.   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

## 2018-07-12 NOTE — ASSESSMENT & PLAN NOTE
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 Ordered routine labs in addition to FLT3, NGS, and AML FISH.  - lo res HLA typing on 5/26/18 and hi res on 5/27/18 done in anticipation of possible need for future transplant   - Pt with two daughters, two full sisters (in their mid 60s, one with brain aneurysm hx, other one without any medical issues), and one full brother (61 y/o healthy).  - ECHO ordered 5/25/18, EF 65%  - PICC line placed 5/25/18   - initially on hydrea, discontinued on 5/28.   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  - started on induction therapy on 5/29/18.  - Day 45 of 7+3.    - allopurinol stopped 6/11  - prophylactic antimicrobials: Acyclovir, Voriconazole  - will need CNS Prophylaxis given her elevated WBC (high risk) on admission after count recovery, IT chemo in fluoro scheduled for 7/13 at 1:30 pm.  - NPM1 +, CEBPA (-), FLT3 (+).  On Midostaurin 50 mg PO BID until Day 14 (held starting 6/8 to 6/11). Stopped when FLAG JENNIFER re-induction started. Restarted Midostaurin at 25 mg bid on day 8 of FLAG JENNIFER induction (6/22), increase to full dose 50 mg bid (6/26) as improvement in liver enzymes. Stopped 7/3/18 due to abnormal cardiac echo.  - day 14 bone marrow biopsy completed 6/11 showing persistent disease with 15% CD 34 positive blasts  - Day 28 of FLAG-JENNIFER, tolerating without difficulty except nausea/vomiting  - repeat 2D echo on 6/15 with preserved EF of 60%.  However 7/2/18 echo with reduced EF 30-35%  - day 14 restaging bone marrow biopsy done 6/29 without complication, results with no evidence of AML, FLT 3 negative, will need repeat marrow at count recovery outpatient   - awaiting count recovery for safe discharge home, engrafted 7/12/18 with ANC of 730   - plan for d/c tomorrow after IT chemo

## 2018-07-12 NOTE — ASSESSMENT & PLAN NOTE
- secondary to chemotherapy.   - WBC 0.82 and , Neupogen per FLAG JENNIFER protocol  - hgb 8.1 and platelet count 15,000 today  - tranfuse if Hb < 7 g/dL and Plt < 10,000/mm3 or active bleed.  - will likely need plt transfusion tomorrow prior to IT chemotherapy

## 2018-07-12 NOTE — SUBJECTIVE & OBJECTIVE
Subjective:     Interval History:   Day 28 flag rosales, engrafted today with ANC of 730. Back pain improved with zyrtec. Scheduled for IT chemo tomorrow at 1:30 pm and discharge home thereafter. Will likely need plt transfusion prior to IT chemo tomorrow     Objective:     Vital Signs (Most Recent):  Temp: 98.4 °F (36.9 °C) (07/12/18 0726)  Pulse: 86 (07/12/18 0726)  Resp: 16 (07/12/18 0726)  BP: 123/64 (07/12/18 0726)  SpO2: 96 % (07/12/18 0726) Vital Signs (24h Range):  Temp:  [97.7 °F (36.5 °C)-98.6 °F (37 °C)] 98.4 °F (36.9 °C)  Pulse:  [67-86] 86  Resp:  [15-20] 16  SpO2:  [93 %-96 %] 96 %  BP: (110-131)/(54-64) 123/64     Weight: 63.9 kg (140 lb 12.2 oz)  Body mass index is 24.94 kg/m².  Body surface area is 1.69 meters squared.      Intake/Output - Last 3 Shifts       07/10 0700 - 07/11 0659 07/11 0700 - 07/12 0659 07/12 0700 - 07/13 0659    P.O. 840      Blood  350     Total Intake(mL/kg) 840 (13.1) 350 (5.5)     Urine (mL/kg/hr) 475 (0.3) 1125 (0.7) 500 (1.8)    Total Output 475 1125 500    Net +365 -775 -500           Urine Occurrence 3 x      Stool Occurrence 2 x 1 x           Physical Exam   Constitutional: She is oriented to person, place, and time. She appears well-developed and well-nourished. No distress.   HENT:   Mouth/Throat: Oropharynx is clear and moist. No oropharyngeal exudate or posterior oropharyngeal erythema.   Eyes: Conjunctivae are normal. No scleral icterus.   Neck: Normal range of motion. Neck supple. No thyromegaly present.   Cardiovascular: Normal rate, regular rhythm, normal heart sounds and intact distal pulses.    Pulmonary/Chest: Effort normal and breath sounds normal. No respiratory distress.   Abdominal: Soft. Bowel sounds are normal. She exhibits no distension. There is no splenomegaly or hepatomegaly. There is no tenderness.   Musculoskeletal: Normal range of motion. She exhibits no edema or tenderness.   Neurological: She is alert and oriented to person, place, and time. No  cranial nerve deficit. Coordination normal.   Skin: No rash noted. No cyanosis. No pallor. Nails show no clubbing.   RUE PICC C/D/I  Large blue resolving bruise to right calf   Psychiatric: She has a normal mood and affect. Thought content normal.   Vitals reviewed.      Significant Labs:   CBC:     Recent Labs  Lab 07/11/18 0411 07/12/18  0406   WBC 0.29* 0.82*   HGB 6.9* 8.1*   HCT 20.7* 23.4*   PLT 22* 15*    and CMP:     Recent Labs  Lab 07/11/18  0411 07/12/18  0406    140   K 3.8 4.3    102   CO2 27 28   * 93   BUN 13 9   CREATININE 0.7 0.7   CALCIUM 9.2 9.9   PROT 6.4 6.2   ALBUMIN 3.1* 3.0*   BILITOT 0.5 0.6   ALKPHOS 453* 473*   AST 28 19   ALT 17 14   ANIONGAP 9 10   EGFRNONAA >60.0 >60.0       Diagnostic Results:  None

## 2018-07-12 NOTE — ASSESSMENT & PLAN NOTE
- ALT and AST have now normalized. Alk phos 473 today.   -   - midostaurin started day 8 at 25 mg bid, monitoring liver enzymes closely and improved. Increasing Midostaurin to 50 mg bid 6/26. Stopped Midostaurin (7/3) due to new diagnosis of systolic heart failure EF 35%.  - 6/22/18 liver US showing mild intrahepatic dilation, otherwise impression of hepatic steatosis.  Will hold off of MRCP at this time.

## 2018-07-12 NOTE — PLAN OF CARE
Problem: Patient Care Overview  Goal: Plan of Care Review  Outcome: Ongoing (interventions implemented as appropriate)  Fall, pressure ulcer, and infection precautions continued. Vital signs stable. Magnesium replaced as ordered. Pain managed with PRN medication and heat packs. Patient stable, will continue to monitor.

## 2018-07-12 NOTE — PROGRESS NOTES
[facility]  Hematology  Bone Marrow Transplant  Progress Note    Patient Name: Noreen Mac  Admission Date: 5/24/2018  Hospital Length of Stay: 49 days  Code Status: Full Code    Subjective:     Interval History:   Day 28 flag rosales, engrafted today with ANC of 730. Back pain improved with zyrtec. Scheduled for IT chemo tomorrow at 1:30 pm and discharge home thereafter. Will likely need plt transfusion prior to IT chemo tomorrow     Objective:     Vital Signs (Most Recent):  Temp: 98.4 °F (36.9 °C) (07/12/18 0726)  Pulse: 86 (07/12/18 0726)  Resp: 16 (07/12/18 0726)  BP: 123/64 (07/12/18 0726)  SpO2: 96 % (07/12/18 0726) Vital Signs (24h Range):  Temp:  [97.7 °F (36.5 °C)-98.6 °F (37 °C)] 98.4 °F (36.9 °C)  Pulse:  [67-86] 86  Resp:  [15-20] 16  SpO2:  [93 %-96 %] 96 %  BP: (110-131)/(54-64) 123/64     Weight: 63.9 kg (140 lb 12.2 oz)  Body mass index is 24.94 kg/m².  Body surface area is 1.69 meters squared.      Intake/Output - Last 3 Shifts       07/10 0700 - 07/11 0659 07/11 0700 - 07/12 0659 07/12 0700 - 07/13 0659    P.O. 840      Blood  350     Total Intake(mL/kg) 840 (13.1) 350 (5.5)     Urine (mL/kg/hr) 475 (0.3) 1125 (0.7) 500 (1.8)    Total Output 475 1125 500    Net +365 -775 -500           Urine Occurrence 3 x      Stool Occurrence 2 x 1 x           Physical Exam   Constitutional: She is oriented to person, place, and time. She appears well-developed and well-nourished. No distress.   HENT:   Mouth/Throat: Oropharynx is clear and moist. No oropharyngeal exudate or posterior oropharyngeal erythema.   Eyes: Conjunctivae are normal. No scleral icterus.   Neck: Normal range of motion. Neck supple. No thyromegaly present.   Cardiovascular: Normal rate, regular rhythm, normal heart sounds and intact distal pulses.    Pulmonary/Chest: Effort normal and breath sounds normal. No respiratory distress.   Abdominal: Soft. Bowel sounds are normal. She exhibits no distension. There is no  splenomegaly or hepatomegaly. There is no tenderness.   Musculoskeletal: Normal range of motion. She exhibits no edema or tenderness.   Neurological: She is alert and oriented to person, place, and time. No cranial nerve deficit. Coordination normal.   Skin: No rash noted. No cyanosis. No pallor. Nails show no clubbing.   RUE PICC C/D/I  Large blue resolving bruise to right calf   Psychiatric: She has a normal mood and affect. Thought content normal.   Vitals reviewed.      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 Ordered routine labs in addition to FLT3, NGS, and AML FISH.  - lo res HLA typing on 5/26/18 and hi res on 5/27/18 done in anticipation of possible need for future transplant   - Pt with two daughters, two full sisters (in their mid 60s, one with brain aneurysm hx, other one without any medical issues), and one full brother (61 y/o healthy).  - ECHO ordered 5/25/18, EF 65%  - PICC line placed 5/25/18   - initially on hydrea, discontinued on 5/28.   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  - started on induction therapy on 5/29/18.  - Day 45 of 7+3.    - allopurinol stopped 6/11  - prophylactic antimicrobials: Acyclovir, Voriconazole  - will need CNS Prophylaxis given her  elevated WBC (high risk) on admission after count recovery, IT chemo in fluoro scheduled for 7/13 at 1:30 pm.  - NPM1 +, CEBPA (-), FLT3 (+).  On Midostaurin 50 mg PO BID until Day 14 (held starting 6/8 to 6/11). Stopped when FLAG JENNIFER re-induction started. Restarted Midostaurin at 25 mg bid on day 8 of FLAG JENNIFER induction (6/22), increase to full dose 50 mg bid (6/26) as improvement in liver enzymes. Stopped 7/3/18 due to abnormal cardiac echo.  - day 14 bone marrow biopsy completed 6/11 showing persistent disease with 15% CD 34 positive blasts  - Day 28 of FLAG-JENNIFER, tolerating without difficulty except nausea/vomiting  - repeat 2D echo on 6/15 with preserved EF of 60%.  However 7/2/18 echo with reduced EF 30-35%  - day 14 restaging bone marrow biopsy done 6/29 without complication, results with no evidence of AML, FLT 3 negative, will need repeat marrow at count recovery outpatient   - awaiting count recovery for safe discharge home, engrafted 7/12/18 with ANC of 730   - plan for d/c tomorrow after IT chemo        Acute back pain    - likely due to bone marrow recovery  - continue Oxy PRN  - started Zyrtec 7/11/18, now much improved        Chemotherapy induced cardiomyopathy    - sob and O2 requirements--resolved  - chest x-ray with pulmonary edema  - 2 D echo from 7/2 with EF of 30-35%, pulmonary HTN and severe L atrial enlargement  - cardiology following, appreciate recs  - Lopressor was discontinued.  Started Lisinopril and Toprol XL and po lasix on 7/3  - cardiology follow-up as outpatient in 3 months         CINV (chemotherapy-induced nausea and vomiting)    - completed olanzapine daily x 6 days  - prn Zofran and Ativan, Zofran stopped due to QTc prolongation  - Compazine IV q6hr PRN   - no nausea or vomiting overnight  - meds converted back to po and tolerating without difficulty         Abnormal liver enzymes    - ALT and AST have now normalized. Alk phos 473 today.   -   - midostaurin started day 8 at  25 mg bid, monitoring liver enzymes closely and improved. Increasing Midostaurin to 50 mg bid 6/26. Stopped Midostaurin (7/3) due to new diagnosis of systolic heart failure EF 35%.  - 6/22/18 liver US showing mild intrahepatic dilation, otherwise impression of hepatic steatosis.  Will hold off of MRCP at this time.           Hypomagnesemia    - Mag 1.7 today  - mag oxide 400mg bid was increase to 800mg bid  - replace per protocol        Pancytopenia    - secondary to chemotherapy.   - WBC 0.82 and , Neupogen per FLAG JENNIFER protocol  - hgb 8.1 and platelet count 15,000 today  - tranfuse if Hb < 7 g/dL and Plt < 10,000/mm3 or active bleed.  - will likely need plt transfusion tomorrow prior to IT chemotherapy        MCTD (mixed connective tissue disease)    - MCTD-NOS  - previously on MTX and Prednisone.   - off therapy now and otherwise doing well.   - can follow up outpatient with her Rheumatologist upon discharge.         Staphylococcus epidermidis bacteremia    - blood cultures from 6/11 at 0430 with gram + cocci, staph epidermis sensitive for Vancomycin, CXR and UA unremarkable.   - surveillance blood cultures repeated 6/13 NGTD  - de-escalated aztreonam back to prophylactic cipro 6/16  - ID consulted on 6/19, rec continue Vanc for 2 weeks post last negative culture (EOT 6/27)  - BC from 6/11 with gram negative rods, leptotrichia goodfellowii  - completed Flagyl po x 5 days (EOT 6/24)  - ID has signed off  - patient remains afebrile  - repeat bcx 6/13 ngtd; resolved        Pulmonary nodule    - CXR suggestive of 7 mm slightly irregular shaped radiodensity projected over the left upper lung zone which could represent a calcified granuloma but remains indeterminate.  - CT Chest on 5/28: Multiple scattered noncalcified pulmonary granulomas, the largest measuring 8 mm in the lateral segment of the right middle lobe.   - repeat non-contrast chest CT at 6-12 months         Insomnia    - PRN nightly Xanax        HTN  (hypertension)    - history of SVT   - metoprolol tartrate changed on 6/13 to 25 mg bid (hold for SBP <100) due to hypotension  - cardiology consulted for abnormal echo and lopressor stopped and lisinopril and Toprol XL started  - HTN adequately controlled        Osteoporosis    - takes prolia once every 6 months, last given mid April 2018   - no acute issue         Depression    - no acute issue   - continue home Escitalopram              VTE Risk Mitigation         Ordered     heparin, porcine (PF) 100 unit/mL injection flush 300 Units  As needed (PRN)      06/15/18 1657     Place MAYLIN hose  Until discontinued      05/25/18 0032     IP VTE HIGH RISK PATIENT  Once      05/25/18 0032          Disposition: likely d/c tomorrow after IT chemo    Norma Berg, EUGENIE  Bone Marrow Transplant  [facility]

## 2018-07-13 VITALS
SYSTOLIC BLOOD PRESSURE: 112 MMHG | RESPIRATION RATE: 18 BRPM | DIASTOLIC BLOOD PRESSURE: 57 MMHG | TEMPERATURE: 98 F | HEIGHT: 63 IN | WEIGHT: 139 LBS | OXYGEN SATURATION: 99 % | BODY MASS INDEX: 24.63 KG/M2 | HEART RATE: 75 BPM

## 2018-07-13 LAB
ALBUMIN SERPL BCP-MCNC: 3.1 G/DL
ALP SERPL-CCNC: 422 U/L
ALT SERPL W/O P-5'-P-CCNC: 10 U/L
ANION GAP SERPL CALC-SCNC: 9 MMOL/L
ANISOCYTOSIS BLD QL SMEAR: SLIGHT
AST SERPL-CCNC: 12 U/L
BASOPHILS # BLD AUTO: 0.01 K/UL
BASOPHILS NFR BLD: 0 %
BASOPHILS NFR BLD: 0.7 %
BILIRUB SERPL-MCNC: 0.5 MG/DL
BLD PROD TYP BPU: NORMAL
BLOOD UNIT EXPIRATION DATE: NORMAL
BLOOD UNIT TYPE CODE: 600
BLOOD UNIT TYPE: NORMAL
BUN SERPL-MCNC: 7 MG/DL
CALCIUM SERPL-MCNC: 10 MG/DL
CHLORIDE SERPL-SCNC: 100 MMOL/L
CLARITY CSF: CLEAR
CO2 SERPL-SCNC: 29 MMOL/L
CODING SYSTEM: NORMAL
COLOR CSF: COLORLESS
CREAT SERPL-MCNC: 0.7 MG/DL
DIFFERENTIAL METHOD: ABNORMAL
DIFFERENTIAL METHOD: ABNORMAL
DISPENSE STATUS: NORMAL
EOSINOPHIL # BLD AUTO: 0 K/UL
EOSINOPHIL NFR BLD: 0 %
EOSINOPHIL NFR BLD: 0 %
ERYTHROCYTE [DISTWIDTH] IN BLOOD BY AUTOMATED COUNT: 13.1 %
ERYTHROCYTE [DISTWIDTH] IN BLOOD BY AUTOMATED COUNT: 13.1 %
EST. GFR  (AFRICAN AMERICAN): >60 ML/MIN/1.73 M^2
EST. GFR  (NON AFRICAN AMERICAN): >60 ML/MIN/1.73 M^2
GLUCOSE CSF-MCNC: 63 MG/DL
GLUCOSE SERPL-MCNC: 99 MG/DL
HCT VFR BLD AUTO: 23 %
HCT VFR BLD AUTO: 25.3 %
HGB BLD-MCNC: 7.9 G/DL
HGB BLD-MCNC: 8.7 G/DL
HYPOCHROMIA BLD QL SMEAR: ABNORMAL
IMM GRANULOCYTES # BLD AUTO: 0.04 K/UL
IMM GRANULOCYTES # BLD AUTO: ABNORMAL K/UL
IMM GRANULOCYTES NFR BLD AUTO: 2.8 %
IMM GRANULOCYTES NFR BLD AUTO: ABNORMAL %
LYMPHOCYTES # BLD AUTO: 0 K/UL
LYMPHOCYTES NFR BLD: 1 %
LYMPHOCYTES NFR BLD: 2.1 %
LYMPHOCYTES NFR CSF MANUAL: 25 %
MAGNESIUM SERPL-MCNC: 1.5 MG/DL
MCH RBC QN AUTO: 30.7 PG
MCH RBC QN AUTO: 30.9 PG
MCHC RBC AUTO-ENTMCNC: 34.3 G/DL
MCHC RBC AUTO-ENTMCNC: 34.4 G/DL
MCV RBC AUTO: 89 FL
MCV RBC AUTO: 90 FL
MONOCYTES # BLD AUTO: 0 K/UL
MONOCYTES NFR BLD: 0 %
MONOCYTES NFR BLD: 2.8 %
MONOS+MACROS NFR CSF MANUAL: 75 %
NEUTROPHILS # BLD AUTO: 1.3 K/UL
NEUTROPHILS NFR BLD: 91.6 %
NEUTROPHILS NFR BLD: 94 %
NEUTS BAND NFR BLD MANUAL: 5 %
NRBC BLD-RTO: 0 /100 WBC
NRBC BLD-RTO: 0 /100 WBC
NUM UNITS TRANS WBC-POOR PLATPHERESIS: NORMAL
OVALOCYTES BLD QL SMEAR: ABNORMAL
PHOSPHATE SERPL-MCNC: 3.9 MG/DL
PLATELET # BLD AUTO: 13 K/UL
PLATELET # BLD AUTO: 55 K/UL
PLATELET # BLD AUTO: 61 K/UL
PLATELET BLD QL SMEAR: ABNORMAL
PMV BLD AUTO: 11.4 FL
PMV BLD AUTO: 9.1 FL
PMV BLD AUTO: 9.8 FL
POIKILOCYTOSIS BLD QL SMEAR: SLIGHT
POLYCHROMASIA BLD QL SMEAR: ABNORMAL
POTASSIUM SERPL-SCNC: 4.2 MMOL/L
PROT CSF-MCNC: 26 MG/DL
PROT SERPL-MCNC: 6.5 G/DL
RBC # BLD AUTO: 2.56 M/UL
RBC # BLD AUTO: 2.83 M/UL
RBC # CSF: 1 /CU MM
SODIUM SERPL-SCNC: 138 MMOL/L
SPECIMEN VOL CSF: 1 ML
WBC # BLD AUTO: 1.45 K/UL
WBC # BLD AUTO: 1.79 K/UL
WBC # CSF: 0 /CU MM

## 2018-07-13 PROCEDURE — 25000003 PHARM REV CODE 250: Performed by: HOSPITALIST

## 2018-07-13 PROCEDURE — 82945 GLUCOSE OTHER FLUID: CPT

## 2018-07-13 PROCEDURE — 89051 BODY FLUID CELL COUNT: CPT

## 2018-07-13 PROCEDURE — 84100 ASSAY OF PHOSPHORUS: CPT

## 2018-07-13 PROCEDURE — 25000003 PHARM REV CODE 250: Performed by: NURSE PRACTITIONER

## 2018-07-13 PROCEDURE — 85049 AUTOMATED PLATELET COUNT: CPT

## 2018-07-13 PROCEDURE — A4216 STERILE WATER/SALINE, 10 ML: HCPCS | Performed by: INTERNAL MEDICINE

## 2018-07-13 PROCEDURE — 36430 TRANSFUSION BLD/BLD COMPNT: CPT

## 2018-07-13 PROCEDURE — P9037 PLATE PHERES LEUKOREDU IRRAD: HCPCS

## 2018-07-13 PROCEDURE — 84157 ASSAY OF PROTEIN OTHER: CPT

## 2018-07-13 PROCEDURE — 85025 COMPLETE CBC W/AUTO DIFF WBC: CPT

## 2018-07-13 PROCEDURE — 83615 LACTATE (LD) (LDH) ENZYME: CPT

## 2018-07-13 PROCEDURE — 25000003 PHARM REV CODE 250: Performed by: INTERNAL MEDICINE

## 2018-07-13 PROCEDURE — 99239 HOSP IP/OBS DSCHRG MGMT >30: CPT | Mod: GC,,, | Performed by: INTERNAL MEDICINE

## 2018-07-13 PROCEDURE — 25000003 PHARM REV CODE 250: Performed by: STUDENT IN AN ORGANIZED HEALTH CARE EDUCATION/TRAINING PROGRAM

## 2018-07-13 PROCEDURE — 83735 ASSAY OF MAGNESIUM: CPT

## 2018-07-13 PROCEDURE — 63600175 PHARM REV CODE 636 W HCPCS: Performed by: INTERNAL MEDICINE

## 2018-07-13 PROCEDURE — 88108 CYTOPATH CONCENTRATE TECH: CPT | Performed by: PATHOLOGY

## 2018-07-13 PROCEDURE — 3E0R305 INTRODUCTION OF OTHER ANTINEOPLASTIC INTO SPINAL CANAL, PERCUTANEOUS APPROACH: ICD-10-PCS | Performed by: RADIOLOGY

## 2018-07-13 PROCEDURE — 80053 COMPREHEN METABOLIC PANEL: CPT

## 2018-07-13 RX ORDER — OXYCODONE HYDROCHLORIDE 5 MG/1
5 TABLET ORAL EVERY 6 HOURS PRN
Qty: 40 TABLET | Refills: 0
Start: 2018-07-13 | End: 2018-07-13 | Stop reason: HOSPADM

## 2018-07-13 RX ORDER — FUROSEMIDE 20 MG/1
20 TABLET ORAL DAILY
Qty: 30 TABLET | Refills: 2 | Status: ON HOLD | OUTPATIENT
Start: 2018-07-14 | End: 2018-07-25 | Stop reason: HOSPADM

## 2018-07-13 RX ORDER — CHOLECALCIFEROL (VITAMIN D3) 25 MCG
1000 TABLET ORAL DAILY
Status: DISCONTINUED | OUTPATIENT
Start: 2018-07-13 | End: 2018-07-13 | Stop reason: HOSPADM

## 2018-07-13 RX ORDER — CETIRIZINE HYDROCHLORIDE 10 MG/1
10 TABLET ORAL DAILY
Qty: 10 TABLET | Refills: 0 | Status: SHIPPED | OUTPATIENT
Start: 2018-07-14 | End: 2018-08-28

## 2018-07-13 RX ORDER — ACYCLOVIR 200 MG/1
400 CAPSULE ORAL 2 TIMES DAILY
Qty: 120 CAPSULE | Refills: 11 | Status: SHIPPED | OUTPATIENT
Start: 2018-07-13 | End: 2018-07-31 | Stop reason: SDUPTHER

## 2018-07-13 RX ORDER — LISINOPRIL 5 MG/1
5 TABLET ORAL DAILY
Qty: 30 TABLET | Refills: 3 | Status: ON HOLD | OUTPATIENT
Start: 2018-07-14 | End: 2018-07-25 | Stop reason: HOSPADM

## 2018-07-13 RX ORDER — ALPRAZOLAM 0.25 MG/1
0.25 TABLET ORAL 3 TIMES DAILY PRN
Qty: 40 TABLET | Refills: 1
Start: 2018-07-13 | End: 2018-07-13

## 2018-07-13 RX ORDER — ALPRAZOLAM 0.25 MG/1
0.25 TABLET ORAL 3 TIMES DAILY PRN
Qty: 40 TABLET | Refills: 1 | Status: ON HOLD | OUTPATIENT
Start: 2018-07-13 | End: 2018-09-16

## 2018-07-13 RX ORDER — HYDROCODONE BITARTRATE AND ACETAMINOPHEN 500; 5 MG/1; MG/1
TABLET ORAL
Status: DISCONTINUED | OUTPATIENT
Start: 2018-07-13 | End: 2018-07-13 | Stop reason: HOSPADM

## 2018-07-13 RX ORDER — HYDROCODONE BITARTRATE AND ACETAMINOPHEN 500; 5 MG/1; MG/1
TABLET ORAL
Status: DISCONTINUED | OUTPATIENT
Start: 2018-07-13 | End: 2018-07-13

## 2018-07-13 RX ORDER — LANOLIN ALCOHOL/MO/W.PET/CERES
800 CREAM (GRAM) TOPICAL 2 TIMES DAILY
Qty: 120 TABLET | Refills: 1 | Status: SHIPPED | OUTPATIENT
Start: 2018-07-13 | End: 2019-10-25

## 2018-07-13 RX ORDER — MORPHINE SULFATE 15 MG/1
15 TABLET ORAL EVERY 4 HOURS PRN
Qty: 40 TABLET | Refills: 0
Start: 2018-07-13 | End: 2018-07-13

## 2018-07-13 RX ORDER — METOPROLOL SUCCINATE 25 MG/1
25 TABLET, EXTENDED RELEASE ORAL DAILY
Qty: 30 TABLET | Refills: 3 | Status: ON HOLD | OUTPATIENT
Start: 2018-07-14 | End: 2018-08-19 | Stop reason: SDUPTHER

## 2018-07-13 RX ORDER — MORPHINE SULFATE 15 MG/1
15 TABLET ORAL EVERY 4 HOURS PRN
Qty: 40 TABLET | Refills: 0 | Status: SHIPPED | OUTPATIENT
Start: 2018-07-13 | End: 2018-07-16 | Stop reason: SDUPTHER

## 2018-07-13 RX ORDER — CHOLECALCIFEROL (VITAMIN D3) 25 MCG
1000 TABLET ORAL DAILY
Qty: 30 TABLET | Refills: 2 | Status: SHIPPED | OUTPATIENT
Start: 2018-07-14 | End: 2019-10-25

## 2018-07-13 RX ADMIN — ACETAMINOPHEN 650 MG: 325 TABLET, FILM COATED ORAL at 08:07

## 2018-07-13 RX ADMIN — FUROSEMIDE 20 MG: 20 TABLET ORAL at 08:07

## 2018-07-13 RX ADMIN — MORPHINE SULFATE 15 MG: 15 TABLET ORAL at 04:07

## 2018-07-13 RX ADMIN — PANTOPRAZOLE SODIUM 40 MG: 40 TABLET, DELAYED RELEASE ORAL at 08:07

## 2018-07-13 RX ADMIN — Medication 10 ML: at 12:07

## 2018-07-13 RX ADMIN — MAGNESIUM OXIDE TAB 400 MG (241.3 MG ELEMENTAL MG) 800 MG: 400 (241.3 MG) TAB at 08:07

## 2018-07-13 RX ADMIN — Medication 2 MG: at 06:07

## 2018-07-13 RX ADMIN — ACYCLOVIR 400 MG: 200 CAPSULE ORAL at 08:07

## 2018-07-13 RX ADMIN — Medication 400 MG: at 11:07

## 2018-07-13 RX ADMIN — VITAMIN D, TAB 1000IU (100/BT) 1000 UNITS: 25 TAB at 08:07

## 2018-07-13 RX ADMIN — METOPROLOL SUCCINATE 25 MG: 25 TABLET, EXTENDED RELEASE ORAL at 08:07

## 2018-07-13 RX ADMIN — MORPHINE SULFATE 15 MG: 15 TABLET ORAL at 08:07

## 2018-07-13 RX ADMIN — LISINOPRIL 5 MG: 2.5 TABLET ORAL at 08:07

## 2018-07-13 RX ADMIN — ESCITALOPRAM 10 MG: 5 TABLET, FILM COATED ORAL at 08:07

## 2018-07-13 RX ADMIN — CETIRIZINE HYDROCHLORIDE 10 MG: 5 TABLET, FILM COATED ORAL at 08:07

## 2018-07-13 RX ADMIN — DIPHENHYDRAMINE HYDROCHLORIDE 12.5 MG: 50 INJECTION, SOLUTION INTRAMUSCULAR; INTRAVENOUS at 08:07

## 2018-07-13 NOTE — PROGRESS NOTES
[facility]  Hematology  Bone Marrow Transplant  Progress Note    Patient Name: Noreen Mac  Admission Date: 5/24/2018  Hospital Length of Stay: 50 days  Code Status: Full Code    Subjective:     Interval History:   Day 29 of FLAG JENNIFER. ANC up to 1300 today. Transfusing platelets today prior to LP for IT chemo. Patient continues to complain of bone pain, mostly to back and legs. No nausea, diarrhea, sob. Afebrile and VSS. Will discharge home today after IT chemo with follow-up on Monday 7/16 with Dr. Troy    Objective:     Vital Signs (Most Recent):  Temp: 97.7 °F (36.5 °C) (07/13/18 1002)  Pulse: 71 (07/13/18 1002)  Resp: 16 (07/13/18 1002)  BP: (!) 123/58 (07/13/18 1002)  SpO2: 97 % (07/13/18 1002) Vital Signs (24h Range):  Temp:  [97.4 °F (36.3 °C)-98.6 °F (37 °C)] 97.7 °F (36.5 °C)  Pulse:  [70-89] 71  Resp:  [16-20] 16  SpO2:  [93 %-98 %] 97 %  BP: (112-141)/(56-79) 123/58     Weight: 63 kg (139 lb)  Body mass index is 24.62 kg/m².  Body surface area is 1.67 meters squared.      Intake/Output - Last 3 Shifts       07/11 0700 - 07/12 0659 07/12 0700 - 07/13 0659 07/13 0700 - 07/14 0659    P.O.  1440 240    Blood 350  286    Total Intake(mL/kg) 350 (5.5) 1440 (22.9) 526 (8.3)    Urine (mL/kg/hr) 1125 (0.7) 2000 (1.3) 200 (0.7)    Total Output 1125 2000 200    Net -775 -560 +326           Stool Occurrence 1 x 1 x           Physical Exam   Constitutional: She is oriented to person, place, and time. She appears well-developed and well-nourished. No distress.   HENT:   Mouth/Throat: Oropharynx is clear and moist. No oropharyngeal exudate or posterior oropharyngeal erythema.   Eyes: Conjunctivae are normal. No scleral icterus.   Neck: Normal range of motion. Neck supple. No thyromegaly present.   Cardiovascular: Normal rate, regular rhythm, normal heart sounds and intact distal pulses.    Pulmonary/Chest: Effort normal and breath sounds normal. No respiratory distress.   Abdominal: Soft. Bowel  sounds are normal. She exhibits no distension. There is no splenomegaly or hepatomegaly. There is no tenderness.   Musculoskeletal: Normal range of motion. She exhibits no edema or tenderness.   Neurological: She is alert and oriented to person, place, and time. No cranial nerve deficit. Coordination normal.   Skin: No rash noted. No cyanosis. No pallor. Nails show no clubbing.   RUE PICC C/D/I  Large blue resolving bruise to right calf   Psychiatric: She has a normal mood and affect. Thought content normal.   Vitals reviewed.      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 Ordered routine labs in addition to FLT3, NGS, and AML FISH.  - lo res HLA typing on 5/26/18 and hi res on 5/27/18 done in anticipation of possible need for future transplant   - Pt with two daughters, two full sisters (in their mid 60s, one with brain aneurysm hx, other one without any medical issues), and one full brother (61 y/o healthy).  - ECHO ordered 5/25/18, EF 65%  - PICC line placed 5/25/18   - initially on hydrea, discontinued on 5/28.   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  - started on induction therapy on 5/29/18.  - Day 46 of 7+3.    - allopurinol stopped 6/11  -  prophylactic antimicrobials: Acyclovir, Voriconazole  - will need CNS Prophylaxis given her elevated WBC (high risk) on admission after count recovery, IT chemo in fluoro scheduled for 7/13 at 1:30 pm.  - NPM1 +, CEBPA (-), FLT3 (+).  On Midostaurin 50 mg PO BID until Day 14 (held starting 6/8 to 6/11). Stopped when FLAG JENNIFER re-induction started. Restarted Midostaurin at 25 mg bid on day 8 of FLAG JENNIFER induction (6/22), increase to full dose 50 mg bid (6/26) as improvement in liver enzymes. Stopped 7/3/18 due to abnormal cardiac echo.  - day 14 bone marrow biopsy completed 6/11 showing persistent disease with 15% CD 34 positive blasts  - Day 29 of FLAG-JENNIFER, tolerating without difficulty except nausea/vomiting  - repeat 2D echo on 6/15 with preserved EF of 60%.  However 7/2/18 echo with reduced EF 30-35%  - day 14 restaging bone marrow biopsy done 6/29 without complication, results with no evidence of AML, FLT 3 negative, will need repeat marrow at count recovery outpatient   - awaiting count recovery for safe discharge home, engrafted 7/12/18, ANC of 1300 today  - plan for d/c today after IT chemo        Staphylococcus epidermidis bacteremia    - blood cultures from 6/11 at 0430 with gram + cocci, staph epidermis sensitive for Vancomycin, CXR and UA unremarkable.   - surveillance blood cultures repeated 6/13 NGTD  - de-escalated aztreonam back to prophylactic cipro 6/16  - ID consulted on 6/19, rec continue Vanc for 2 weeks post last negative culture (EOT 6/27)  - BC from 6/11 with gram negative rods, leptotrichia goodfellowii  - completed Flagyl po x 5 days (EOT 6/24)  - ID has signed off  - patient remains afebrile  - repeat bcx 6/13 ngtd; resolved  - will stop ppx Vantin and voriconazole today        Pancytopenia    - secondary to chemotherapy.   - WBC 1.79 and ANC 1300, stop Neupogen today. Will stop ppx Vantin and voriconazole  - hgb 7.9 and platelet count 13,000 today  - tranfuse if Hb < 7 g/dL and Plt <  10,000/mm3 or active bleed.  - will transfuse 1 unit platelets and recheck platelet count prior to IT chemotherapy today        Abnormal liver enzymes    - ALT and AST have now normalized. Alk phos 422 today.   - midostaurin started day 8 at 25 mg bid, monitoring liver enzymes closely and improved. Increasing Midostaurin to 50 mg bid 6/26. Stopped Midostaurin (7/3) due to new diagnosis of systolic heart failure EF 35%.  - 6/22/18 liver US showing mild intrahepatic dilation, otherwise impression of hepatic steatosis.  Will hold off of MRCP at this time.           Acute back pain    - likely due to bone marrow recovery  - continue MS IR PRN  - started Zyrtec 7/11/18 with some improvement        Chemotherapy induced cardiomyopathy    - sob and O2 requirements--resolved  - chest x-ray with pulmonary edema  - 2 D echo from 7/2 with EF of 30-35%, pulmonary HTN and severe L atrial enlargement  - cardiology following, appreciate recs  - Lopressor was discontinued.  Started Lisinopril and Toprol XL and po lasix on 7/3, will continue outpatient  - cardiology follow-up as outpatient in 3 months         CINV (chemotherapy-induced nausea and vomiting)    - resolved  - completed olanzapine daily x 6 days  - prn Zofran and Ativan, Zofran stopped due to QTc prolongation  - Compazine IV q6hr PRN   - meds converted back to po and tolerating without difficulty         Hypomagnesemia    - Mag 1.5 today  - continue mag oxide 800mg bid   - replace per protocol        MCTD (mixed connective tissue disease)    - MCTD-NOS  - previously on MTX and Prednisone.   - off therapy now and otherwise doing well.   - can follow up outpatient with her Rheumatologist upon discharge.         Pulmonary nodule    - CXR suggestive of 7 mm slightly irregular shaped radiodensity projected over the left upper lung zone which could represent a calcified granuloma but remains indeterminate.  - CT Chest on 5/28: Multiple scattered noncalcified pulmonary  granulomas, the largest measuring 8 mm in the lateral segment of the right middle lobe.   - repeat non-contrast chest CT at 6-12 months         Insomnia    - PRN nightly Xanax        HTN (hypertension)    - history of SVT   - metoprolol tartrate changed on 6/13 to 25 mg bid (hold for SBP <100) due to hypotension  - cardiology consulted for abnormal echo and lopressor stopped and lisinopril and Toprol XL started  - HTN adequately controlled        Osteoporosis    - takes prolia once every 6 months, last given mid April 2018   - no acute issue         Depression    - no acute issue   - continue home Escitalopram              VTE Risk Mitigation         Ordered     heparin, porcine (PF) 100 unit/mL injection flush 300 Units  As needed (PRN)      06/15/18 1657     Place MAYLIN hose  Until discontinued      05/25/18 0032     IP VTE HIGH RISK PATIENT  Once      05/25/18 0032          Disposition: discharge home later today    Sweetie Lepe NP  Bone Marrow Transplant  [facility]

## 2018-07-13 NOTE — PROGRESS NOTES
Per MD pt is medically stable for DC. Pt to DC home with family support. No SW needs identified at this time. Based on all available information this is a safe and appropriate DC plan.    Radha Urias [facility]  Oncology Social Worker  Phone: [phone]

## 2018-07-13 NOTE — ASSESSMENT & PLAN NOTE
- ALT and AST have now normalized. Alk phos 422 today.   - midostaurin started day 8 at 25 mg bid, monitoring liver enzymes closely and improved. Increasing Midostaurin to 50 mg bid 6/26. Stopped Midostaurin (7/3) due to new diagnosis of systolic heart failure EF 35%.  - 6/22/18 liver US showing mild intrahepatic dilation, otherwise impression of hepatic steatosis.  Will hold off of MRCP at this time.

## 2018-07-13 NOTE — ASSESSMENT & PLAN NOTE
- blood cultures from 6/11 at 0430 with gram + cocci, staph epidermis sensitive for Vancomycin, CXR and UA unremarkable.   - surveillance blood cultures repeated 6/13 NGTD  - de-escalated aztreonam back to prophylactic cipro 6/16  - ID consulted on 6/19, rec continue Vanc for 2 weeks post last negative culture (EOT 6/27)  - BC from 6/11 with gram negative rods, leptotrichia goodfellowii  - completed Flagyl po x 5 days (EOT 6/24)  - ID has signed off  - patient remains afebrile  - repeat bcx 6/13 ngtd; resolved  - will stop ppx Vantin and voriconazole today

## 2018-07-13 NOTE — ASSESSMENT & PLAN NOTE
- sob and O2 requirements--resolved  - chest x-ray with pulmonary edema  - 2 D echo from 7/2 with EF of 30-35%, pulmonary HTN and severe L atrial enlargement  - cardiology following, appreciate recs  - Lopressor was discontinued.  Started Lisinopril and Toprol XL and po lasix on 7/3, will continue outpatient  - cardiology follow-up as outpatient in 3 months

## 2018-07-13 NOTE — PROGRESS NOTES
Road Test  Oxygen- Pt on room air without distress  Ambulation- Pt ambulates without assistance  Devices- Pt going home without any devices    Tolerating- Pt tolerates a regular diet  Elimination- Pt voids in the toilet without difficulty  Self care- Pt performs self care without assistance  Teaching- Pt given written and verbal discharge instructions.    Pt tolerates room air, ambulation, regular diet, voiding, and self care. Vital signs stable. PICC line removed, catheter intact, bleeding controlled, site covered with dry gauze and co-band. Pt going home with . Reviewed discharge instructions and address all questions. Follow-up appointments, medications, signs and symptoms to notify the MD discussed. Pt states has all belongings.  Pt has no questions or needs at this time.

## 2018-07-13 NOTE — H&P
Inpatient Radiology Pre-procedure Note    History of Present Illness:  Noreen Mac is a 68 y.o. female who presents for LP with intrathecal chemo infusion.  Admission H&P reviewed.  Past Medical History:   Diagnosis Date    Hypertension      Past Surgical History:   Procedure Laterality Date    CHOLECYSTECTOMY      HYSTERECTOMY      TONSILLECTOMY         Review of Systems:   As documented in primary team H&P    Home Meds:   Prior to Admission medications    Medication Sig Start Date End Date Taking? Authorizing Provider   escitalopram oxalate (LEXAPRO) 10 MG tablet Take 10 mg by mouth once daily.   Yes Historical Provider, MD   omeprazole (PRILOSEC OTC) 20 MG tablet Take 20 mg by mouth every morning.   Yes Historical Provider, MD   metoprolol succinate (TOPROL-XL) 25 MG 24 hr tablet Take 25 mg by mouth once daily.  7/13/18 Yes Historical Provider, MD   acyclovir (ZOVIRAX) 200 MG capsule Take 2 capsules (400 mg total) by mouth 2 (two) times daily. 7/13/18 7/13/19  Sweetie Lepe NP   ALPRAZolam (XANAX) 0.25 MG tablet Take 1 tablet (0.25 mg total) by mouth 3 (three) times daily as needed for Anxiety. 7/13/18 8/12/18  Laquita Troy MD   cetirizine (ZYRTEC) 10 MG tablet Take 1 tablet (10 mg total) by mouth once daily for 10 days 7/14/18 7/24/18  Sweetie Lepe NP   furosemide (LASIX) 20 MG tablet Take 1 tablet (20 mg total) by mouth once daily. 7/14/18 7/14/19  Sweetie Lepe NP   lisinopril (PRINIVIL,ZESTRIL) 5 MG tablet Take 1 tablet (5 mg total) by mouth once daily. 7/14/18 7/14/19  Sweetie Lepe NP   magnesium oxide (MAG-OX) 400 mg tablet Take 2 tablets (800 mg total) by mouth 2 (two) times daily. 7/13/18   Sweetie Lepe NP   metoprolol succinate (TOPROL-XL) 25 MG 24 hr tablet Take 1 tablet (25 mg total) by mouth once daily. 7/14/18 7/14/19  Sweetie Lepe NP   morphine (MSIR) 15 MG tablet Take 1 tablet (15 mg total) by mouth every 4 (four) hours as needed. 7/13/18   Laquita MILLS  MD Sydni   vitamin D 1000 units Tab Take 1 tablet (1,000 Units total) by mouth once daily. 7/14/18   Sweetie Lepe NP   ALPRAZolam (XANAX) 0.25 MG tablet Take 1 tablet (0.25 mg total) by mouth 3 (three) times daily as needed for Anxiety. 7/13/18 7/13/18  Sweetie Lepe NP   morphine (MSIR) 15 MG tablet Take 1 tablet (15 mg total) by mouth every 4 (four) hours as needed. 7/13/18 7/13/18  Sweetie Lepe NP   oxyCODONE (ROXICODONE) 5 MG immediate release tablet Take 1 tablet (5 mg total) by mouth every 6 (six) hours as needed for Pain. 7/13/18 7/13/18  Sweetie Lepe NP     Scheduled Meds:    acyclovir  400 mg Oral BID    cetirizine  10 mg Oral Daily    escitalopram oxalate  10 mg Oral Daily    furosemide  20 mg Oral Daily    lisinopril  5 mg Oral Daily    magnesium oxide  800 mg Oral BID    methotrexate (RHEUMATREX) INTRATHECAL chemo injection   Intrathecal Once    metoprolol succinate  25 mg Oral Daily    pantoprazole  40 mg Oral Daily    sodium chloride 0.9%  10 mL Intravenous Q6H    vitamin D  1,000 Units Oral Daily     Continuous Infusions:   PRN Meds:sodium chloride, acetaminophen, ALPRAZolam, alteplase, diphenhydrAMINE, heparin, porcine (PF), loperamide, lorazepam, magnesium oxide, magnesium oxide, magnesium oxide, miconazole NITRATE 2 %, morphine, morphine, ondansetron, potassium chloride, potassium chloride, potassium chloride, potassium, sodium phosphates, potassium, sodium phosphates, prochlorperazine, ramelteon, simethicone, sodium chloride, Flushing PICC Protocol **AND** sodium chloride 0.9% **AND** sodium chloride 0.9%, sodium chloride 0.9%  Anticoagulants/Antiplatelets: no anticoagulation    Allergies:   Review of patient's allergies indicates:   Allergen Reactions    Methotrexate analogues      Elevated Liver Enzyme    Bactrim [sulfamethoxazole-trimethoprim] Nausea And Vomiting and Rash     Sedation Hx: have not been any systemic reactions    Labs:  No results for  input(s): INR in the last 168 hours.    Invalid input(s):  PT,  PTT    Recent Labs  Lab 07/13/18  1048 07/13/18  1158   WBC 1.79*  --    HGB 7.9*  --    HCT 23.0*  --    MCV 90  --    PLT 55* 61*      Recent Labs  Lab 07/13/18  0420   GLU 99      K 4.2      CO2 29   BUN 7*   CREATININE 0.7   CALCIUM 10.0   MG 1.5*   ALT 10   AST 12   ALBUMIN 3.1*   BILITOT 0.5         Vitals:  Temp: 96.2 °F (35.7 °C) (07/13/18 1221)  Pulse: 76 (07/13/18 1221)  Resp: 19 (07/13/18 1221)  BP: (!) 121/59 (07/13/18 1221)  SpO2: 96 % (07/13/18 1221)     Physical Exam:  ASA: 2  Mallampati: 2    General: no acute distress  Mental Status: alert and oriented to person, place and time  HEENT: normocephalic, atraumatic  Chest: unlabored breathing  Abdomen: nondistended  Extremity: moves all extremities    Plan: LP with intrathecal chemo infusion  Sedation Plan: local injection with 1% lidocaine    Washington Kim MD  Diagnostic Radiology PGY-2  [facility]

## 2018-07-13 NOTE — ASSESSMENT & PLAN NOTE
- likely due to bone marrow recovery  - continue MS IR PRN  - started Zyrtec 7/11/18 with some improvement

## 2018-07-13 NOTE — PLAN OF CARE
Problem: Patient Care Overview  Goal: Plan of Care Review  Outcome: Ongoing (interventions implemented as appropriate)  Patient progressing towards discharge.  Patient had no acute events noted.  Potential discharge later this AM after staff rounds.  Discussed plan of care with patient and family with verbalization of understanding.  Will continue to monitor.

## 2018-07-13 NOTE — ASSESSMENT & PLAN NOTE
- secondary to chemotherapy.   - WBC 1.79 and ANC 1300, stop Neupogen today. Will stop ppx Vantin and voriconazole  - hgb 7.9 and platelet count 13,000 today  - tranfuse if Hb < 7 g/dL and Plt < 10,000/mm3 or active bleed.  - will transfuse 1 unit platelets and recheck platelet count prior to IT chemotherapy today

## 2018-07-13 NOTE — PROCEDURES
Radiology Post-Procedure Note    Pre Op Diagnosis: AML    Post Op Diagnosis: Same    Procedure: lumbar puncture with intrathecal chemo injection     Procedure performed by: Hector Pineda MD; Washington Kim MD    Written Informed Consent Obtained: Yes    Specimen Removed: 9 mL CSF    Estimated Blood Loss: Minimal    Findings: Following written informed consent and sterile prep and drape, a 22 gauge spinal needle was inserted at L3 - L4 intralaminar space under fluoroscopic surveillance.  9 mL clear CSF removed and sent to the lab for further analysis.  There were no complications.    Patient tolerated procedure well.    Washington Kim MD  Diagnostic Radiology PGY-2  [facility]

## 2018-07-13 NOTE — SUBJECTIVE & OBJECTIVE
Subjective:     Interval History:   Day 29 of FLAG JENNIFER. ANC up to 1300 today. Transfusing platelets today prior to LP for IT chemo. Patient continues to complain of bone pain, mostly to back and legs. No nausea, diarrhea, sob. Afebrile and VSS. Will discharge home today after IT chemo with follow-up on Monday 7/16 with Dr. Troy    Objective:     Vital Signs (Most Recent):  Temp: 97.7 °F (36.5 °C) (07/13/18 1002)  Pulse: 71 (07/13/18 1002)  Resp: 16 (07/13/18 1002)  BP: (!) 123/58 (07/13/18 1002)  SpO2: 97 % (07/13/18 1002) Vital Signs (24h Range):  Temp:  [97.4 °F (36.3 °C)-98.6 °F (37 °C)] 97.7 °F (36.5 °C)  Pulse:  [70-89] 71  Resp:  [16-20] 16  SpO2:  [93 %-98 %] 97 %  BP: (112-141)/(56-79) 123/58     Weight: 63 kg (139 lb)  Body mass index is 24.62 kg/m².  Body surface area is 1.67 meters squared.      Intake/Output - Last 3 Shifts       07/11 0700 - 07/12 0659 07/12 0700 - 07/13 0659 07/13 0700 - 07/14 0659    P.O.  1440 240    Blood 350  286    Total Intake(mL/kg) 350 (5.5) 1440 (22.9) 526 (8.3)    Urine (mL/kg/hr) 1125 (0.7) 2000 (1.3) 200 (0.7)    Total Output 1125 2000 200    Net -775 -560 +326           Stool Occurrence 1 x 1 x           Physical Exam   Constitutional: She is oriented to person, place, and time. She appears well-developed and well-nourished. No distress.   HENT:   Mouth/Throat: Oropharynx is clear and moist. No oropharyngeal exudate or posterior oropharyngeal erythema.   Eyes: Conjunctivae are normal. No scleral icterus.   Neck: Normal range of motion. Neck supple. No thyromegaly present.   Cardiovascular: Normal rate, regular rhythm, normal heart sounds and intact distal pulses.    Pulmonary/Chest: Effort normal and breath sounds normal. No respiratory distress.   Abdominal: Soft. Bowel sounds are normal. She exhibits no distension. There is no splenomegaly or hepatomegaly. There is no tenderness.   Musculoskeletal: Normal range of motion. She exhibits no edema or tenderness.    Neurological: She is alert and oriented to person, place, and time. No cranial nerve deficit. Coordination normal.   Skin: No rash noted. No cyanosis. No pallor. Nails show no clubbing.   RUE PICC C/D/I  Large blue resolving bruise to right calf   Psychiatric: She has a normal mood and affect. Thought content normal.   Vitals reviewed.      Significant Labs:   CBC:   Recent Labs  Lab 07/12/18  0406 07/13/18  0420 07/13/18  1048   WBC 0.82* 1.45* 1.79*   HGB 8.1* 8.7* 7.9*   HCT 23.4* 25.3* 23.0*   PLT 15* 13* 55*    and CMP:   Recent Labs  Lab 07/12/18  0406 07/13/18  0420    138   K 4.3 4.2    100   CO2 28 29   GLU 93 99   BUN 9 7*   CREATININE 0.7 0.7   CALCIUM 9.9 10.0   PROT 6.2 6.5   ALBUMIN 3.0* 3.1*   BILITOT 0.6 0.5   ALKPHOS 473* 422*   AST 19 12   ALT 14 10   ANIONGAP 10 9   EGFRNONAA >60.0 >60.0       Diagnostic Results:  None

## 2018-07-13 NOTE — ASSESSMENT & PLAN NOTE
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 Ordered routine labs in addition to FLT3, NGS, and AML FISH.  - lo res HLA typing on 5/26/18 and hi res on 5/27/18 done in anticipation of possible need for future transplant   - Pt with two daughters, two full sisters (in their mid 60s, one with brain aneurysm hx, other one without any medical issues), and one full brother (59 y/o healthy).  - ECHO ordered 5/25/18, EF 65%  - PICC line placed 5/25/18   - initially on hydrea, discontinued on 5/28.   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  - started on induction therapy on 5/29/18.  - Day 46 of 7+3.    - allopurinol stopped 6/11  - prophylactic antimicrobials: Acyclovir, Voriconazole  - will need CNS Prophylaxis given her elevated WBC (high risk) on admission after count recovery, IT chemo in fluoro scheduled for 7/13 at 1:30 pm.  - NPM1 +, CEBPA (-), FLT3 (+).  On Midostaurin 50 mg PO BID until Day 14 (held starting 6/8 to 6/11). Stopped when FLAG JENNIFER re-induction started. Restarted Midostaurin at 25 mg bid on day 8 of FLAG JENNIFER induction (6/22), increase to full dose 50 mg bid (6/26) as improvement in liver enzymes. Stopped 7/3/18 due to abnormal cardiac echo.  - day 14 bone marrow biopsy completed 6/11 showing persistent disease with 15% CD 34 positive blasts  - Day 29 of FLAG-JENNIFER, tolerating without difficulty except nausea/vomiting  - repeat 2D echo on 6/15 with preserved EF of 60%.  However 7/2/18 echo with reduced EF 30-35%  - day 14 restaging bone marrow biopsy done 6/29 without complication, results with no evidence of AML, FLT 3 negative, will need repeat marrow at count recovery outpatient   - awaiting count recovery for safe discharge home, engrafted 7/12/18, ANC of 1300 today  - plan for d/c today after IT chemo

## 2018-07-13 NOTE — DISCHARGE SUMMARY
[facility]  Hematology  Bone Marrow Transplant  Discharge Summary      Patient Name: Noreen Mac  MRN: [id number]  Admission Date: 2018  Hospital Length of Stay: 50 days  Discharge Date and Time: 2018  3:34 PM  Attending Physician: No att. providers found   Discharging Provider: Sweetie Lepe NP  Primary Care Provider: Primary Doctor No    HPI: 68 y.o female h/o connective tissue disease (not sure what exactly), depression and Osteoporosis who presented to OSH with a couple weeks h/o generalized malaise, myalgias and fatigue. He reports does not feel well and has no energy or appetite to eat. She complains of headache for couple days. Denies any vision changes or head trauma. Pt had lab works at an urgent clinic which showed elevate WBC (100s).      At OSH. He had a CT head WO contrast which showed no acute process. She did NOT have a bone marrow bx at OSH. She was transferred to [facility] for further evaluation and treatment.      During my interview, she reports a headache (3 out of 10). Denies any vision changes, chest pain, sob, fever, chills, cough or bleeding. She reports family hx of leukemia. Her mother  at age of 49 with leukemia. Denies smoking but drinks occasionally. No h/o previous cancer, radiation or chemotherapy    * No surgery found *     Hospital Course: 68 y.o. female admitted overnight for possible new AML. Came in from OSH with elevated WBC of 100.   2018: Pt is now on hydrea 2 grams BID, allopurinol 300 mg daily, and IVF. Pt may need rasburicase this weekend. She will undergo a BM bx and aspiration today. She is an induction candidate and will not be screened for research trials. She has anxiety for which she usually takes xanax, this was re-started.   2018 PICC placed. Reports she slept well last night. Anxiety improved with prn xanax. EF 65%, HIV and Hep panel negative. 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  2018: Day 15  of 7+3 induction, restaging BM bx done yesterday. On Midostaurin. Fever yesterday and BC with gram + cocci in clusters. On cefepime day 2 and will start Vanc today. Labial mucositis improving.   6/13/2018: Day 16 7+3. BC with staph aureus, identification pending. Surveillance blood cultures done today. Afebrile x 48 hours. On cefepime and Vanc. Labial mucositis resolved. No complaints of pain. Nausea controlled. No diarrhea. Primary complaint is fatigue.   6/14/20018: Day 17 of 7+3. Day 14 bone marrow results pending. BC with staph epidermis only sensitive to Vancomycin. Vanc day 3 and cefepime day 4. Vanc trough 12.2 last night. BP remains low but stable. Afebrile x 72 hours.   6/15/2018: Day 18 of 7+3. Day 14 bone marrow biopsy shows persistent disease with 15% blasts. Discussion with patient regarding treatment and she is deciding if she wants to proceed with re-induction vs outpatient maintenance. Temp of 100.4 overnight, unsustained. Day 5 Cefepime and Day 4 of Vanc for staph epidermis. Repeat cultures NGTD. BP improved. Electrolytes (mag, phos, K and calcium) replaced aggressively this am and will repeat labs this afternoon. No complaints this am.   06/16/2018: Day 19 of 7+3. Day 2 of Flag-JENNIFER. Remains afebrile. Calcium remains low and have increased PO supplementation. Remains on vanc and aztreonam. Somewhat loopy feeling after receiving benadryl.   06/17/2018: Day 20 of 7+3. Day 3 of FLAG-JENNIFER. Multiple electrolyte abnormalities. New bilateral leg and feet swelling.   6/18/2018: Day 21 of 7+3 and Day 4 of FLAG JENNIFER. Tolerating well with some nausea controlled with Zofran. VSS, afebrile. ANC 1900 on Neupogen. Continues Vanc for staph epi bacteremia. Multiple electrolytes replaced today. Complains of edema to lower extremities, not improved after 20 of lasix yesterday. No sob or CP.   6/19/18: Day 22 of 7+3 and Day 5 of FLAG JENNIFER. VSS, afebrile, ANC 3900. Vanc trough elevated and dose adjusted this am. Lower  extremity edema improved after 40 of lasix yesterday, net negative 300 cc I&O. Mild nausea, no abdominal pain or diarrhea. Poor appetite but no difficulty eating or taking pills.   6/20/18: Day 23 of 7+3 and Day 6 of FLAG JENNIFER. Patient complains of nausea and vomiting overnight and this am, especially when taking pills. Will add Zyprexa daily in addition to Zofran, compazine, and ativan PRN and will change meds to IV. 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 Afebrile.  No diarrhea, mouth sores or pain.  06/21/2018: Day 24 of 7+3 and Day 7 of FLAG JENNIFER. Nausea better controlled. Continued on Vanc.  6/22/2018: Day 25 of 7+3 and Day 8 of FLAG JENNIFER. Nausea improved some with Zyprexa but did have 1 episode of emesis overnight.continuing meds IV due to vomiting. Remains afebrile. ANC 0. Continues Vanc and Flagyl. Electrolytes replaced.   06/23/2018 : day 26 of 7+3 and Day 9 of FLAG JENNIFER.  Nausea persists, worsened after taking pills so meds converted to IV.  Encouraged ambulation.  Continue with flagyl for leptotrichia goodfellowii.  RUQ US showed intrahepatic dilation, overall impression hepatic steatosis.  CBD 0.5cm.  No Gallbladder.    06/24/2018 : day 27 of 7+3 and Day 10 of FLAG JENNIFER.  Nausea persisting, able to tolerate some po pills.  Last day of flagyl (day 5).  Still pancytopenic. Appetite still low as po intake triggers nausea.  06/25/2018: day 28 of 7+3 and Day 11 of FLAG JENNIFER. Afebrile, ANC 0. Has completed Flagyl. Will decrease Vanc to 1000 mg q12, trough 19.9, will complete Vanc on 6/27. Liver enzymes stable on Midostaurin. VSS.   6/26/2018: day 29 of 7+3 and day 12 of FLAG JENNIFER. Liver enzymes improved and Midostaurin increased to full dose 50 mg bid. Nausea controlled, afebrile, VSS, NEON.  6/27/2018: day 30 of 7+3 and Day 13 of FLAG JENNIFER. Persistent nausea and emesis x 1 yesterday. Compazine changed to scheduled. Vanc ends today. Afebrile,  VSS. Aggressive electrolyte replacement, low electrolytes possibly due to Midostaurin.   6/28/2018: day 31 of 7+3 and Day 14 of FLAG JENNIFER. Nausea improved with scheduled Compazine. Patient has agreed to start converting some IV meds to po. VSS, afebrile, electrolytes wnl today. Only complains of fatigue, new rash noted to upper back and chest and legs, papular rash mildly red.  6/29/2018: Day 32 of 7+3 and Day 15 of FLAG JENNIFER. Day 14 restaging bone marrow biopsy done at bedside today. Patient states nausea improved but she did have emesis last night after taking 9 pm pills. Rash improved. No mouth sores, diarrhea or pain.   7/2/2018: Day 35 of 7+3 and Day 18 of FLAG JENNIFER. Restaging BM bx results pending. Fluid overload over the weekend. Chest x-ray with pulmonary edema. Os sats down to 88%. Placed on O2 at 2 L NC, off O2 this am. Received lasix. Net negative 2.7 L today. 1 episode of nausea, no emesis. Reports anxiety relieved with PRN meds. Electrolytes improved, afebrile, VSS.   7/3/2018: Day 36 of 7+3 and Day 19 of FLAG JENNIFER. Restaging Bm bx with GABRIEL. Cardiology consulted for abnormal echo, EF 30% with pulmonary HTN and severe L atrial enlargement. EKG with T wave abnormality, possible anterolateral ischemia and prolonged QTc of 530. Medications adjusted. Nausea controlled, no diarrhea. Electrolytes improved. On O2 as needed.   07/04/2018 : Day 37 of 7+3 and Day 20 of FLAG JENNIFER Diarrhea in AM, watery, no associated abdominal pain, nausea, or vomiting.    07/05/2018: Day 38 of 7+3 ane Day 21 of FLAG JENNIFER. No CP or SOB, cardiology following. On RA. All meds changed to po, denies nausea or vomiting and no diarrhea. VSS, afebrile.   7/6/2018: Day 22 of FLAG JENNIFER. Continues to tolerate po meds with no nausea. Afebrile. Awaiting count recovery for discharge.  7/7/2018-/8/2018: Day 40/41 of 7+3, Day 23/24 of FLAG JENNIFER.  Improving clinically.  Started on mag oxide per patient request.  Labs showing signs of ANC  recovery.  7/9/2018: Day 25 FLAG JENNIFER, , continues Neupogen. Received platelets today. Afebrile, VSS. Tolerating all oral meds with no nausea or vomiting. Only complains of fatigue.   7/10/2018: Day 26 FLAG JENNIFER,  today. Had 2 episodes of vomiting and diarrhea last night. Feeling better. Afebrile, VSS.   7/11/2018: Day 27 FLAG JENNIFER,  today. No vomiting or diarrhea overnight and no nausea. Afebrile, VSS. Complains of back pain (bone pain) suspect due to count recovery. Relieved with oxy IR and Zyrtec started.  07/12/2018: Day 28 flag jennifer, engrafted today with ANC of 730. Back pain improved with zyrtec. Scheduled for IT chemo tomorrow at 1:30 pm and discharge home thereafter. Will likely need plt transfusion prior to IT chemo tomorrow   7/13/2018: Day 29 of FLAG JENNIFER. ANC up to 1300 today. Transfusing platelets today prior to LP for IT chemo. Patient continues to complain of bone pain, mostly to back and legs. No nausea, diarrhea, sob. Afebrile and VSS. Will discharge home today after IT chemo with follow-up on Monday 7/16 with Dr. Troy    Admission summary:  Admitted from OSH with leukocytosis, started on Hydrea. Bone marrow biopsy showed AML, FLT 3 + and NPMN1+. Received induction with 7+3 and started Midostaurin. She had a spike in liver enzymes therefore Midostaurin was held. She also received aggressive electrolyte replacement due to electrolyte abnormalities from Midostaurin. She had vaginal mucositis evaluated by dermatology and resolved. Day 14 marrow showed persistent disease. Patient was re-induced with FLAG-JENNIFER. She did spike a fever on 6/11 and BC were + for staph epi and Leptopatricia goodfellowi. She was treated with IV Vancomycin and Flagyl. Counts recovered with ANC of 1300 prior to discharge. She experienced severe nausea and vomiting during hospital stay. Treated with antiemetics and resolved at discharge. Patient also experienced sob and fluid overload and repeat echo showed EF of  30%. Evaluated by cardiology and started Lisinopril, Toprol and lasix which she will discharge one. Follow-up with cardiology clinic in 3 months per cardiology. Follow-up with Dr. Troy in BMT clinic on 7/16.     Consults         Status Ordering Provider     Inpatient consult to Cardiology  Once     Provider:  (Not yet assigned)    Completed SHARMIN PEREZ     Inpatient consult to Dermatology  Once     Provider:  (Not yet assigned)    Acknowledged STEVE THOMAS     Inpatient consult to Infectious Diseases  Once     Provider:  Washington Urias MD    Completed LOGAN OLSON     Inpatient consult to PICC team ([facility])  Once     Provider:  (Not yet assigned)    Completed BRYAN VASQUEZ          Significant Diagnostic Studies: Labs:   CMP   Recent Labs  Lab 07/12/18  0406 07/13/18  0420    138   K 4.3 4.2    100   CO2 28 29   GLU 93 99   BUN 9 7*   CREATININE 0.7 0.7   CALCIUM 9.9 10.0   PROT 6.2 6.5   ALBUMIN 3.0* 3.1*   BILITOT 0.6 0.5   ALKPHOS 473* 422*   AST 19 12   ALT 14 10   ANIONGAP 10 9   ESTGFRAFRICA >60.0 >60.0   EGFRNONAA >60.0 >60.0   , CBC   Recent Labs  Lab 07/12/18  0406 07/13/18  0420 07/13/18  1048 07/13/18  1158   WBC 0.82* 1.45* 1.79*  --    HGB 8.1* 8.7* 7.9*  --    HCT 23.4* 25.3* 23.0*  --    PLT 15* 13* 55* 61*    and INR   Lab Results   Component Value Date    INR 1.1 05/31/2018    INR 1.1 05/28/2018    INR 1.1 05/25/2018       Pending Diagnostic Studies:     Procedure Component Value Units Date/Time    CBC auto differential [[id number]] Collected:  05/25/18 0505    Order Status:  Sent Lab Status:  In process Updated:  05/25/18 0505    Specimen:  Blood from Blood     Cytology Specimen-Medical Cytology (Fluid/Wash/Brush) [[id number]] Collected:  07/13/18 1350    Order Status:  Sent Lab Status:  No result     Specimen:  Cerebrospinal fluid (CSF)     Flow Cytometry Analysis (CSF) [[id number]] Collected:  07/13/18 1350    Order Status:  Sent Lab Status:  In process Updated:   07/13/18 1454    Specimen:  Cerebrospinal Fluid         Final Active Diagnoses:    Diagnosis Date Noted POA    PRINCIPAL PROBLEM:  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 [C93.00] 05/24/2018 Yes    Staphylococcus epidermidis bacteremia [R78.81] 05/28/2018 No    Pancytopenia [D61.818] 06/13/2018 No    Abnormal liver enzymes [R74.8] 06/13/2018 No    Acute back pain [M54.9] 07/11/2018 No    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, T45.1X5A] 07/03/2018 No    CINV (chemotherapy-induced nausea and vomiting) [R11.2, T45.1X5A] 06/21/2018 Yes    Hypomagnesemia [E83.42] 06/08/2018 No    Insomnia [G47.00] 05/28/2018 Yes    Depression [F32.9] 05/25/2018 Yes    HTN (hypertension) [I10] 05/25/2018 Unknown      Problems Resolved During this Admission:    Diagnosis Date Noted Date Resolved POA    Hypokalemia [E87.6] 06/22/2018 07/12/2018 No    Hypophosphatemia [E83.39] 06/22/2018 07/12/2018 No    Neutropenic fever [D70.9, R50.81] 06/21/2018 06/28/2018 No    Fluid overload [E87.70] 06/17/2018 06/22/2018 No    Hypokalemia [E87.6] 06/15/2018 06/16/2018 No    Hypocalcemia [E83.51] 06/15/2018 07/05/2018 No    Hypophosphatemia [E83.39] 06/08/2018 06/21/2018 Unknown    Vaginal mucositis [N76.81] 06/05/2018 06/16/2018 No    Bilateral headaches [R51] 05/29/2018 06/18/2018 Unknown    Alteration in electrolyte and fluid balance [E87.8] 05/29/2018 06/08/2018 Unknown    GERD (gastroesophageal reflux disease) [K21.9] 05/25/2018 06/25/2018 Yes    Rash of genital area [R21] 05/25/2018 06/08/2018 Unknown      Discharged Condition: good    Disposition: Home or Self Care    Follow Up:  Follow-up Information     [facility] On 7/16/2018.    Specialty:  Lab  Why:  Labs- 10:45  Contact information:  [address]  [facility] [address] [phone]  Additional information:  [facility] 3rd Floor           Laquita Troy MD On 7/16/2018.    Specialties:  Hematology  and Oncology, Hematology, Oncology  Why:  follow up- 11:30am  Contact information:  [address]  [facility] [address]  [phone]             [facility] On 7/16/2018.    Specialty:  Chemotherapy  Why:  Blood if needed- 1:00pm  Contact information:  [address]  [facility] [id number] [phone]  Additional information:  [facility], 5th Floor               Patient Instructions:     Comprehensive metabolic panel   Standing Status: Standing Number of Occurrences: 25 Standing Exp. Date: 07/13/19     Magnesium   Standing Status: Standing Number of Occurrences: 25 Standing Exp. Date: 07/13/19     Phosphorus   Standing Status: Standing Number of Occurrences: 25 Standing Exp. Date: 07/13/19     CBC W/ AUTO DIFFERENTIAL   Standing Status: Standing Number of Occurrences: 25 Standing Exp. Date: 07/13/19     Ambulatory referral to Cardiology   Referral Priority: Routine Referral Type: Consultation   Referral Reason: Specialty Services Required    Requested Specialty: Cardiology    Number of Visits Requested: 1      Ambulatory consult to Cardiology   Referral Priority: Routine Referral Type: Consultation   Referral Reason: Specialty Services Required    Requested Specialty: Cardiology    Number of Visits Requested: 1      Notify your health care provider if you experience any of the following:  temperature >100.4     Notify your health care provider if you experience any of the following:  persistent nausea and vomiting or diarrhea     Notify your health care provider if you experience any of the following:  severe uncontrolled pain     Notify your health care provider if you experience any of the following:  redness, tenderness, or signs of infection (pain, swelling, redness, odor or green/yellow discharge around incision site)     Notify your health care provider if you experience any of the following:  difficulty breathing or increased cough     Notify your health  care provider if you experience any of the following:  persistent dizziness, light-headedness, or visual disturbances     Type & Screen   Standing Status: Standing Number of Occurrences: 25 Standing Exp. Date: 07/13/19     Activity as tolerated       Medications:  Reconciled Home Medications:      Medication List      START taking these medications    acyclovir 200 MG capsule  Commonly known as:  ZOVIRAX  Take 2 capsules (400 mg total) by mouth 2 (two) times daily.     ALPRAZolam 0.25 MG tablet  Commonly known as:  XANAX  Take 1 tablet (0.25 mg total) by mouth 3 (three) times daily as needed for Anxiety.     cetirizine 10 MG tablet  Commonly known as:  ZYRTEC  Take 1 tablet (10 mg total) by mouth once daily for 10 days  Start taking on:  7/14/2018     furosemide 20 MG tablet  Commonly known as:  LASIX  Take 1 tablet (20 mg total) by mouth once daily.  Start taking on:  7/14/2018     lisinopril 5 MG tablet  Commonly known as:  PRINIVIL,ZESTRIL  Take 1 tablet (5 mg total) by mouth once daily.  Start taking on:  7/14/2018     magnesium oxide 400 mg tablet  Commonly known as:  MAG-OX  Take 2 tablets (800 mg total) by mouth 2 (two) times daily.     morphine 15 MG tablet  Commonly known as:  MSIR  Take 1 tablet (15 mg total) by mouth every 4 (four) hours as needed.     vitamin D 1000 units Tab  Take 1 tablet (1,000 Units total) by mouth once daily.  Start taking on:  7/14/2018        CONTINUE taking these medications    escitalopram oxalate 10 MG tablet  Commonly known as:  LEXAPRO  Take 10 mg by mouth once daily.     metoprolol succinate 25 MG 24 hr tablet  Commonly known as:  TOPROL-XL  Take 1 tablet (25 mg total) by mouth once daily.  Start taking on:  7/14/2018     omeprazole 20 MG tablet  Commonly known as:  PRILOSEC OTC  Take 20 mg by mouth every morning.        ASK your doctor about these medications    RYDAPT 25 mg Cap  Generic drug:  midostaurin  Take  2 tablets (50 mg total) by mouth 2 (two) times daily for 3  days  Ask about: Should I take this medication?            Sweetie Lepe NP  Bone Marrow Transplant  [facility]

## 2018-07-13 NOTE — PROCEDURES
Patient seen at Fluoroscopy. LP done per radiology. CSF studies sent. Timeout done. Chemo checked with MD. Methotrexate 12 mg in 4 cc given intrathecally without difficulty.    Sweetie Lepe NP  Hematology/BMT

## 2018-07-13 NOTE — PLAN OF CARE
MDR's with Dr Troy.  Patient is day 29 of FLAG-Maribel.  ANC 1300 today.  Tolerating PO and feels well.  Planning to d/c home today with the support of family.  LP w/ IT chemo to be performed prior to d/c.  PICC line to be pulled.  Message sent to the oncology clinic to schedule labs and f/u on Monday.  IMM signed and the patient agrees with the d/c plan.  Bedside delivery requested.  Will continue to follow.      Future Appointments  Date Time Provider Department Center   7/13/2018 1:30 PM [facility] FLIP2 500 LB LIMIT [facility] XRAY IP [facility]   7/16/2018 11:30 AM Laquita Troy MD [facility] HEM ONC [facility]        07/13/18 1039   Final Note   Assessment Type Final Discharge Note   Discharge Disposition Home   What phone number can be called within the next 1-3 days to see how you are doing after discharge? ([phone])   Hospital Follow Up  Appt(s) scheduled? Yes   Discharge plans and expectations educations in teach back method with documentation complete? Yes

## 2018-07-13 NOTE — ASSESSMENT & PLAN NOTE
- resolved  - completed olanzapine daily x 6 days  - prn Zofran and Ativan, Zofran stopped due to QTc prolongation  - Compazine IV q6hr PRN   - meds converted back to po and tolerating without difficulty

## 2018-07-14 LAB
LDH FLD L TO P-CCNC: 28 U/L
SPECIMEN SOURCE: NORMAL

## 2018-07-16 ENCOUNTER — TELEPHONE (OUTPATIENT)
Dept: HEMATOLOGY/ONCOLOGY | Facility: CLINIC | Age: 68
End: 2018-07-16

## 2018-07-16 ENCOUNTER — LAB VISIT (OUTPATIENT)
Dept: LAB | Facility: HOSPITAL | Age: 68
End: 2018-07-16
Payer: MEDICARE

## 2018-07-16 ENCOUNTER — OFFICE VISIT (OUTPATIENT)
Dept: HEMATOLOGY/ONCOLOGY | Facility: CLINIC | Age: 68
End: 2018-07-16
Payer: MEDICARE

## 2018-07-16 VITALS
TEMPERATURE: 98 F | DIASTOLIC BLOOD PRESSURE: 57 MMHG | BODY MASS INDEX: 26.21 KG/M2 | HEART RATE: 116 BPM | RESPIRATION RATE: 14 BRPM | OXYGEN SATURATION: 99 % | HEIGHT: 62 IN | WEIGHT: 142.44 LBS | SYSTOLIC BLOOD PRESSURE: 109 MMHG

## 2018-07-16 DIAGNOSIS — F32.A DEPRESSION, UNSPECIFIED DEPRESSION TYPE: Primary | ICD-10-CM

## 2018-07-16 DIAGNOSIS — T45.1X5A CHEMOTHERAPY INDUCED CARDIOMYOPATHY: ICD-10-CM

## 2018-07-16 DIAGNOSIS — M54.6 ACUTE BILATERAL THORACIC BACK PAIN: ICD-10-CM

## 2018-07-16 DIAGNOSIS — C93.00: ICD-10-CM

## 2018-07-16 DIAGNOSIS — R11.2 CINV (CHEMOTHERAPY-INDUCED NAUSEA AND VOMITING): ICD-10-CM

## 2018-07-16 DIAGNOSIS — R74.8 ABNORMAL LIVER ENZYMES: ICD-10-CM

## 2018-07-16 DIAGNOSIS — C93.00 ACUTE MONOCYTIC LEUKEMIA NOT HAVING ACHIEVED REMISSION: ICD-10-CM

## 2018-07-16 DIAGNOSIS — T45.1X5A CINV (CHEMOTHERAPY-INDUCED NAUSEA AND VOMITING): ICD-10-CM

## 2018-07-16 DIAGNOSIS — I42.7 CHEMOTHERAPY INDUCED CARDIOMYOPATHY: ICD-10-CM

## 2018-07-16 LAB
ABO + RH BLD: NORMAL
ALBUMIN SERPL BCP-MCNC: 3.1 G/DL
ALP SERPL-CCNC: 602 U/L
ALT SERPL W/O P-5'-P-CCNC: 33 U/L
ANION GAP SERPL CALC-SCNC: 9 MMOL/L
ANISOCYTOSIS BLD QL SMEAR: SLIGHT
AST SERPL-CCNC: 43 U/L
BASOPHILS NFR BLD: 0 %
BILIRUB SERPL-MCNC: 0.6 MG/DL
BLD GP AB SCN CELLS X3 SERPL QL: NORMAL
BUN SERPL-MCNC: 13 MG/DL
CALCIUM SERPL-MCNC: 9.3 MG/DL
CHLORIDE SERPL-SCNC: 101 MMOL/L
CO2 SERPL-SCNC: 26 MMOL/L
CREAT SERPL-MCNC: 0.8 MG/DL
DIFFERENTIAL METHOD: ABNORMAL
EOSINOPHIL NFR BLD: 0 %
ERYTHROCYTE [DISTWIDTH] IN BLOOD BY AUTOMATED COUNT: 12.7 %
EST. GFR  (AFRICAN AMERICAN): >60 ML/MIN/1.73 M^2
EST. GFR  (NON AFRICAN AMERICAN): >60 ML/MIN/1.73 M^2
GLUCOSE SERPL-MCNC: 103 MG/DL
HCT VFR BLD AUTO: 25.8 %
HGB BLD-MCNC: 8.5 G/DL
IMM GRANULOCYTES # BLD AUTO: ABNORMAL K/UL
IMM GRANULOCYTES NFR BLD AUTO: ABNORMAL %
LYMPHOCYTES NFR BLD: 4.5 %
MAGNESIUM SERPL-MCNC: 1.9 MG/DL
MCH RBC QN AUTO: 29.6 PG
MCHC RBC AUTO-ENTMCNC: 32.9 G/DL
MCV RBC AUTO: 90 FL
MONOCYTES NFR BLD: 0 %
NEUTROPHILS NFR BLD: 95.5 %
NRBC BLD-RTO: 0 /100 WBC
OVALOCYTES BLD QL SMEAR: ABNORMAL
PHOSPHATE SERPL-MCNC: 3.2 MG/DL
PLATELET # BLD AUTO: 34 K/UL
PMV BLD AUTO: 9.6 FL
POIKILOCYTOSIS BLD QL SMEAR: SLIGHT
POLYCHROMASIA BLD QL SMEAR: ABNORMAL
POTASSIUM SERPL-SCNC: 4.2 MMOL/L
PROT SERPL-MCNC: 7 G/DL
RBC # BLD AUTO: 2.87 M/UL
SODIUM SERPL-SCNC: 136 MMOL/L
WBC # BLD AUTO: 0.24 K/UL

## 2018-07-16 PROCEDURE — 99215 OFFICE O/P EST HI 40 MIN: CPT | Mod: S$PBB,,, | Performed by: INTERNAL MEDICINE

## 2018-07-16 PROCEDURE — 85007 BL SMEAR W/DIFF WBC COUNT: CPT

## 2018-07-16 PROCEDURE — 99999 PR PBB SHADOW E&M-EST. PATIENT-LVL IV: CPT | Mod: PBBFAC,,, | Performed by: INTERNAL MEDICINE

## 2018-07-16 PROCEDURE — 80053 COMPREHEN METABOLIC PANEL: CPT

## 2018-07-16 PROCEDURE — 86850 RBC ANTIBODY SCREEN: CPT

## 2018-07-16 PROCEDURE — 85027 COMPLETE CBC AUTOMATED: CPT

## 2018-07-16 PROCEDURE — 83735 ASSAY OF MAGNESIUM: CPT

## 2018-07-16 PROCEDURE — 36415 COLL VENOUS BLD VENIPUNCTURE: CPT

## 2018-07-16 PROCEDURE — 84100 ASSAY OF PHOSPHORUS: CPT

## 2018-07-16 PROCEDURE — 99214 OFFICE O/P EST MOD 30 MIN: CPT | Mod: PBBFAC,25 | Performed by: INTERNAL MEDICINE

## 2018-07-16 RX ORDER — MORPHINE SULFATE 15 MG/1
30 TABLET, FILM COATED, EXTENDED RELEASE ORAL 2 TIMES DAILY
Qty: 60 TABLET | Refills: 0 | Status: ON HOLD | OUTPATIENT
Start: 2018-07-16 | End: 2018-09-16 | Stop reason: HOSPADM

## 2018-07-16 RX ORDER — ONDANSETRON HYDROCHLORIDE 8 MG/1
8 TABLET, FILM COATED ORAL EVERY 12 HOURS PRN
Qty: 30 TABLET | Refills: 2 | Status: SHIPPED | OUTPATIENT
Start: 2018-07-16 | End: 2019-02-18 | Stop reason: SDUPTHER

## 2018-07-16 RX ORDER — MORPHINE SULFATE 15 MG/1
30 TABLET, FILM COATED, EXTENDED RELEASE ORAL 2 TIMES DAILY
Qty: 60 TABLET | Refills: 0 | Status: SHIPPED | OUTPATIENT
Start: 2018-07-16 | End: 2018-07-16 | Stop reason: SDUPTHER

## 2018-07-16 RX ORDER — MORPHINE SULFATE 15 MG/1
15 TABLET ORAL EVERY 4 HOURS PRN
Qty: 40 TABLET | Refills: 0 | Status: ON HOLD | OUTPATIENT
Start: 2018-07-16 | End: 2018-09-16 | Stop reason: HOSPADM

## 2018-07-16 NOTE — TELEPHONE ENCOUNTER
----- Message from Essie Patrick sent at 7/16/2018  1:07 PM CDT -----  Contact: Irma with pharmacy  Irma calling stating that pt is there to  Morphine and the directions and strength is incorrect. Pt does not live here and would like rx to be resent as soon as possible.      Irma call back number [phone]    Spoke with [facility].

## 2018-07-16 NOTE — PROGRESS NOTES
Hematology and Medical Oncology   Follow Up Note     07/16/2018    Primary Oncologic Diagnosis: AML, FLT 3 + and NPMN1+.   History of Present Ilness:   Noreen Mac (Noreen) is a pleasant 68 y.o.female presents to clinic following 2 induction therapies for AML. She was in the hospital roughly 50 days to receive 7+3 and then FLAB JENNIFER.    Oncology History:   68 y.o. female admitted overnight for possible new AML. Came in from OSH with elevated WBC of 100.   05/25/2018: Pt is now on hydrea 2 grams BID, allopurinol 300 mg daily, and IVF. Pt may need rasburicase this weekend. She will undergo a BM bx and aspiration today. She is an induction candidate and will not be screened for research trials. She has anxiety for which she usually takes xanax, this was re-started.   05/26/2018 PICC placed. Reports she slept well last night. Anxiety improved with prn xanax. EF 65%, HIV and Hep panel negative. 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  6/12/2018: Day 15 of 7+3 induction, restaging BM bx done yesterday. On Midostaurin. Fever yesterday and BC with gram + cocci in clusters. On cefepime day 2 and will start Vanc today. Labial mucositis improving.   6/13/2018: Day 16 7+3. BC with staph aureus, identification pending. Surveillance blood cultures done today. Afebrile x 48 hours. On cefepime and Vanc. Labial mucositis resolved. No complaints of pain. Nausea controlled. No diarrhea. Primary complaint is fatigue.   6/14/20018: Day 17 of 7+3. Day 14 bone marrow results pending. BC with staph epidermis only sensitive to Vancomycin. Vanc day 3 and cefepime day 4. Vanc trough 12.2 last night. BP remains low but stable. Afebrile x 72 hours.   6/15/2018: Day 18 of 7+3. Day 14 bone marrow biopsy shows persistent disease with 15% blasts. Discussion with patient regarding treatment and she is deciding if she wants to proceed with re-induction vs outpatient maintenance. Temp of 100.4 overnight, unsustained. Day 5 Cefepime and Day 4 of  Vanc for staph epidermis. Repeat cultures NGTD. BP improved. Electrolytes (mag, phos, K and calcium) replaced aggressively this am and will repeat labs this afternoon. No complaints this am.   06/16/2018: Day 19 of 7+3. Day 2 of Flag-JENNIFER. Remains afebrile. Calcium remains low and have increased PO supplementation. Remains on vanc and aztreonam. Somewhat loopy feeling after receiving benadryl.   06/17/2018: Day 20 of 7+3. Day 3 of FLAG-JENNIFER. Multiple electrolyte abnormalities. New bilateral leg and feet swelling.   6/18/2018: Day 21 of 7+3 and Day 4 of FLAG JENNIFER. Tolerating well with some nausea controlled with Zofran. VSS, afebrile. ANC 1900 on Neupogen. Continues Vanc for staph epi bacteremia. Multiple electrolytes replaced today. Complains of edema to lower extremities, not improved after 20 of lasix yesterday. No sob or CP.   6/19/18: Day 22 of 7+3 and Day 5 of FLAG JENNIFER. VSS, afebrile, ANC 3900. Vanc trough elevated and dose adjusted this am. Lower extremity edema improved after 40 of lasix yesterday, net negative 300 cc I&O. Mild nausea, no abdominal pain or diarrhea. Poor appetite but no difficulty eating or taking pills.   6/20/18: Day 23 of 7+3 and Day 6 of FLAG JENNIFER. Patient complains of nausea and vomiting overnight and this am, especially when taking pills. Will add Zyprexa daily in addition to Zofran, compazine, and ativan PRN and will change meds to IV. 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 Afebrile.  No diarrhea, mouth sores or pain.  06/21/2018: Day 24 of 7+3 and Day 7 of FLAG JENNIFER. Nausea better controlled. Continued on Vanc.  6/22/2018: Day 25 of 7+3 and Day 8 of FLAG JENNIFER. Nausea improved some with Zyprexa but did have 1 episode of emesis overnight.continuing meds IV due to vomiting. Remains afebrile. ANC 0. Continues Vanc and Flagyl. Electrolytes replaced.   06/23/2018 : day 26 of 7+3 and Day 9 of FLAG JENNIFER.  Nausea persists,  worsened after taking pills so meds converted to IV.  Encouraged ambulation.  Continue with flagyl for leptotrichia goodfellowii.  RUQ US showed intrahepatic dilation, overall impression hepatic steatosis.  CBD 0.5cm.  No Gallbladder.    06/24/2018 : day 27 of 7+3 and Day 10 of FLAG JENNIFER.  Nausea persisting, able to tolerate some po pills.  Last day of flagyl (day 5).  Still pancytopenic. Appetite still low as po intake triggers nausea.  06/25/2018: day 28 of 7+3 and Day 11 of FLAG JENNIFER. Afebrile, ANC 0. Has completed Flagyl. Will decrease Vanc to 1000 mg q12, trough 19.9, will complete Vanc on 6/27. Liver enzymes stable on Midostaurin. VSS.   6/26/2018: day 29 of 7+3 and day 12 of FLAG JENNIFER. Liver enzymes improved and Midostaurin increased to full dose 50 mg bid. Nausea controlled, afebrile, VSS, NEON.  6/27/2018: day 30 of 7+3 and Day 13 of FLAG JENNIFER. Persistent nausea and emesis x 1 yesterday. Compazine changed to scheduled. Vanc ends today. Afebrile, VSS. Aggressive electrolyte replacement, low electrolytes possibly due to Midostaurin.   6/28/2018: day 31 of 7+3 and Day 14 of FLAG JENNIFER. Nausea improved with scheduled Compazine. Patient has agreed to start converting some IV meds to po. VSS, afebrile, electrolytes wnl today. Only complains of fatigue, new rash noted to upper back and chest and legs, papular rash mildly red.  6/29/2018: Day 32 of 7+3 and Day 15 of FLAG JENNIFER. Day 14 restaging bone marrow biopsy done at bedside today. Patient states nausea improved but she did have emesis last night after taking 9 pm pills. Rash improved. No mouth sores, diarrhea or pain.   7/2/2018: Day 35 of 7+3 and Day 18 of FLAG JENNIFER. Restaging BM bx results pending. Fluid overload over the weekend. Chest x-ray with pulmonary edema. Os sats down to 88%. Placed on O2 at 2 L NC, off O2 this am. Received lasix. Net negative 2.7 L today. 1 episode of nausea, no emesis. Reports anxiety relieved with PRN meds. Electrolytes improved,  afebrile, VSS.   7/3/2018: Day 36 of 7+3 and Day 19 of FLAG JENNIFER. Restaging Bm bx with GABRIEL. Cardiology consulted for abnormal echo, EF 30% with pulmonary HTN and severe L atrial enlargement. EKG with T wave abnormality, possible anterolateral ischemia and prolonged QTc of 530. Medications adjusted. Nausea controlled, no diarrhea. Electrolytes improved. On O2 as needed.   07/04/2018 : Day 37 of 7+3 and Day 20 of FLAG JENNIFER Diarrhea in AM, watery, no associated abdominal pain, nausea, or vomiting.    07/05/2018: Day 38 of 7+3 ane Day 21 of FLAG JENNIFER. No CP or SOB, cardiology following. On RA. All meds changed to po, denies nausea or vomiting and no diarrhea. VSS, afebrile.   7/6/2018: Day 22 of FLAG JENNIFER. Continues to tolerate po meds with no nausea. Afebrile. Awaiting count recovery for discharge.  7/7/2018-/8/2018: Day 40/41 of 7+3, Day 23/24 of FLAG JENNIFER.  Improving clinically.  Started on mag oxide per patient request.  Labs showing signs of ANC recovery.  7/9/2018: Day 25 FLAG JENNIFER, , continues Neupogen. Received platelets today. Afebrile, VSS. Tolerating all oral meds with no nausea or vomiting. Only complains of fatigue.   7/10/2018: Day 26 FLAG JENNIFER,  today. Had 2 episodes of vomiting and diarrhea last night. Feeling better. Afebrile, VSS.   7/11/2018: Day 27 FLAG JENNIFER,  today. No vomiting or diarrhea overnight and no nausea. Afebrile, VSS. Complains of back pain (bone pain) suspect due to count recovery. Relieved with oxy IR and Zyrtec started.  07/12/2018: Day 28 flag jennifer, engrafted today with ANC of 730. Back pain improved with zyrtec. Scheduled for IT chemo tomorrow at 1:30 pm and discharge home thereafter. Will likely need plt transfusion prior to IT chemo tomorrow   7/13/2018: Day 29 of FLAG JENNIFER. ANC up to 1300 today. Transfusing platelets today prior to LP for IT chemo. Patient continues to complain of bone pain, mostly to back and legs. No nausea, diarrhea, sob. Afebrile and VSS.      Interval History:   Ms. Mac has greatly enjoyed her time at home the last 3 days. She has been eating and getting out side to walk around. The two main problems have been significant bilateral posterior rib pain and severe nausea in the car. Short acting pain medications do very little to alleviate the discomfort.     Past Medical History:   Past Medical History:   Diagnosis Date    Hypertension        Current Medications:   Current Outpatient Prescriptions   Medication Sig    acyclovir (ZOVIRAX) 200 MG capsule Take 2 capsules (400 mg total) by mouth 2 (two) times daily.    ALPRAZolam (XANAX) 0.25 MG tablet Take 1 tablet (0.25 mg total) by mouth 3 (three) times daily as needed for Anxiety.    cetirizine (ZYRTEC) 10 MG tablet Take 1 tablet (10 mg total) by mouth once daily for 10 days    escitalopram oxalate (LEXAPRO) 10 MG tablet Take 10 mg by mouth once daily.    furosemide (LASIX) 20 MG tablet Take 1 tablet (20 mg total) by mouth once daily.    lisinopril (PRINIVIL,ZESTRIL) 5 MG tablet Take 1 tablet (5 mg total) by mouth once daily.    magnesium oxide (MAG-OX) 400 mg tablet Take 2 tablets (800 mg total) by mouth 2 (two) times daily.    metoprolol succinate (TOPROL-XL) 25 MG 24 hr tablet Take 1 tablet (25 mg total) by mouth once daily.    morphine (MSIR) 15 MG tablet Take 1 tablet (15 mg total) by mouth every 4 (four) hours as needed.    omeprazole (PRILOSEC OTC) 20 MG tablet Take 20 mg by mouth every morning.    vitamin D 1000 units Tab Take 1 tablet (1,000 Units total) by mouth once daily.     No current facility-administered medications for this visit.      ALLERGIES:   Review of patient's allergies indicates:   Allergen Reactions    Methotrexate analogues      Elevated Liver Enzyme    Bactrim [sulfamethoxazole-trimethoprim] Nausea And Vomiting and Rash       Review of Systems:     Review of Systems   Constitutional: Positive for appetite change and fatigue. Negative for chills, diaphoresis,  fever and unexpected weight change.   HENT:   Negative for hearing loss, mouth sores, nosebleeds, sore throat, trouble swallowing and voice change.    Eyes: Negative for eye problems and icterus.   Respiratory: Negative for chest tightness, cough, hemoptysis, shortness of breath and wheezing.    Cardiovascular: Positive for chest pain. Negative for leg swelling and palpitations.   Gastrointestinal: Positive for diarrhea and nausea. Negative for abdominal distention, abdominal pain, blood in stool and vomiting.   Endocrine: Negative for hot flashes.   Genitourinary: Negative for bladder incontinence, difficulty urinating, dysuria and hematuria.    Musculoskeletal: Positive for back pain. Negative for arthralgias, flank pain, gait problem, myalgias, neck pain and neck stiffness.   Skin: Negative for itching, rash and wound.   Neurological: Negative for dizziness, extremity weakness, gait problem, headaches, numbness, seizures and speech difficulty.   Hematological: Negative for adenopathy. Does not bruise/bleed easily.   Psychiatric/Behavioral: Positive for sleep disturbance. Negative for confusion and depression. The patient is nervous/anxious.           Physical Exam:     Vitals:    07/16/18 1201   BP: (!) 109/57   Pulse: (!) 116   Resp: 14   Temp: 97.9 °F (36.6 °C)       Physical Exam   Constitutional: She is oriented to person, place, and time. She appears well-developed and well-nourished. No distress.   In moderate distress secondary to severe nausea and bone pain   HENT:   Head: Normocephalic and atraumatic.   Mouth/Throat: Oropharynx is clear and moist. No oropharyngeal exudate.   No hair, wearing a cap   Eyes: Conjunctivae and EOM are normal. Pupils are equal, round, and reactive to light.   Neck: Normal range of motion. Neck supple. No JVD present. No tracheal deviation present. No thyromegaly present.   Cardiovascular: Normal rate, regular rhythm and normal heart sounds.  Exam reveals no friction rub.    No  murmur heard.  Pulmonary/Chest: Effort normal and breath sounds normal. No stridor. No respiratory distress. She has no wheezes. She has no rales. She exhibits no tenderness.   Abdominal: Soft. Bowel sounds are normal. She exhibits no distension. There is no tenderness. There is no rebound and no guarding.   Musculoskeletal: Normal range of motion. She exhibits no edema, tenderness or deformity.   Lymphadenopathy:     She has no axillary adenopathy.   Neurological: She is alert and oriented to person, place, and time. She displays normal reflexes. No cranial nerve deficit or sensory deficit. She exhibits normal muscle tone. Coordination normal.   Skin: Skin is warm and dry. Capillary refill takes less than 2 seconds. No rash noted. She is not diaphoretic. No erythema. No pallor.   + bruises and skin flaking   Psychiatric: She has a normal mood and affect. Her behavior is normal. Judgment and thought content normal.       ECOG Performance Status: (foot note - ECOG PS provided by Eastern Cooperative Oncology Group) 2 - Symptomatic, <50% confined to bed    Karnofsky Performance Score:  70%- Cares for Self: Unable to Carry on Normal Activity or Active Work    Labs:   Lab Results   Component Value Date    WBC 0.24 (LL) 07/16/2018    HGB 8.5 (L) 07/16/2018    HCT 25.8 (L) 07/16/2018    PLT 34 (LL) 07/16/2018    ALT 33 07/16/2018    AST 43 (H) 07/16/2018     07/16/2018    K 4.2 07/16/2018     07/16/2018    CREATININE 0.8 07/16/2018    BUN 13 07/16/2018    CO2 26 07/16/2018    INR 1.1 05/31/2018       Imaging: previous imaging has been reviewed    Assessment and Plan:     Mrs. Mac is a pleasant 68 year old female with recently diagnosed AML.      Acute Monocytic Leukemia  -- Admitted from [facility] on 5/25/18 early AM with WBC around 100s, for suspected new non-M3 AML  --lo res HLA typing on 5/26/18 and hi res on 5/27/18 done in anticipation of possible need for future transplant   --Pt with two  daughters, two full sisters (in their mid 60s, one with brain aneurysm hx, other one without any medical issues), and one full brother (61 y/o healthy).  --NPM1 +, CEBPA (-), FLT3 (+).  On Midostaurin 50 mg PO BID until Day 14 (held starting 6/8 to 6/11). Stopped when FLAG JENNIFER re-induction started. Restarted Midostaurin at 25 mg bid on day 8 of FLAG JENNIFER induction (6/22), increase to full dose 50 mg bid (6/26) as improvement in liver enzymes. Stopped 7/3/18 due to abnormal cardiac echo.  --day 14 bone marrow biopsy completed 6/11 showing persistent disease with 15% CD 34 positive blasts  --Day 32 of FLAG-JENNIFER, tolerating without difficulty except nausea/vomiting  --day 14 restaging bone marrow biopsy done 6/29 without complication, results with no evidence of AML, FLT 3 negative, will need repeat marrow at count recovery outpatient   --Plan to return in 2 weeks for recovery marrow and HiDAC consolidation  --I am attempting to set up local care with Dr. Elba Solis in [facility] for Tuesday lab check and follow up while at home.    Abnormal Liver Function Tests  --ALT and AST have now normalized. Alk phos 422 today.   --midostaurin started day 8 at 25 mg bid, monitoring liver enzymes closely and improved. Increasing Midostaurin to 50 mg bid 6/26. Stopped Midostaurin (7/3) due to new diagnosis of systolic heart failure EF 35%.  --6/22/18 liver US showing mild intrahepatic dilation, otherwise impression of hepatic steatosis.  Will hold off of MRCP at this time.     --Slight uptick in alk phos today, will continue to monitor      Chemotherapy induced cardiomyopathy  --Lisinopril and Toprol XL and po lasix on 7/3, will continue outpatient  --cardiology follow-up as outpatient in 3 months  --repeat 2D echo on 6/15 with preserved EF of 60%.  However 7/2/18 echo with reduced EF 30-35%    Bone Pain  --Thought to be secondary to daily neulasta injections  --On claritin and short acting pain meds  --Will add long  acting pain medication today    45 minutes were spent face to face with the patient and her  family to discuss the disease, natural history, treatment options and survival statistics. I have provided the patient with an opportunity to ask questions and have all questions answered to her satisfaction.       she will return to clinic in 2 weeks with a bone marrow and planned admission, but knows to call in the interim if symptoms change or should a problem arise.        Laquita Troy MD  Hematology and Medical Oncology  Bone Marrow Transplant  [facility]

## 2018-07-16 NOTE — TELEPHONE ENCOUNTER
----- Message from Ej Lugo sent at 7/16/2018  1:12 PM CDT -----  Contact: Spouse-Jesi Dennison states they are now at the pharmacy and claims pharmacy does not have Rx morphine 15 mg. Pt is in extreme pain     Contact::[phone]    Spoke with [facility] pharmacy.

## 2018-07-16 NOTE — Clinical Note
On Tuesday July 31st she needs to: 1. See me with cbc,cmp 2. Bone marrow biopsy in clinic with bronson 3. Elective admit to BMT for first round of consolidation  --please start chemo auth process

## 2018-07-17 NOTE — PT/OT/SLP DISCHARGE
Physical Therapy Discharge Summary    Name: Noreen Mac  MRN: [id number]   Principal Problem: 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     Patient Discharged from acute Physical Therapy on 7/13/18.  Please refer to prior PT noted date on 7/10/18 for functional status.     Assessment:     Patient appropriate for care in another setting.    Objective:     GOALS:    Physical Therapy Goals        Problem: Physical Therapy Goal    Goal Priority Disciplines Outcome Goal Variances Interventions   Physical Therapy Goal     PT/OT, PT Ongoing (interventions implemented as appropriate)     Description:  Goals to be met by: 6/29/18    1. Pt will be (I) in LE HEP in order to maintain LE strength and prevent deconditioning during hospital admission.  2. Pt will be (I) with daily walking program involving ambulating community distance 3x daily with family or RN assist as needed.                     Reasons for Discontinuation of Therapy Services  Transfer to alternate level of care.      Plan:     Patient Discharged to: home.    Adrianne Jerez, PT  7/17/2018

## 2018-07-18 ENCOUNTER — TELEPHONE (OUTPATIENT)
Dept: HEMATOLOGY/ONCOLOGY | Facility: CLINIC | Age: 68
End: 2018-07-18

## 2018-07-18 NOTE — TELEPHONE ENCOUNTER
----- Message from Laquita Troy MD sent at 7/18/2018 12:00 PM CDT -----  On Tuesday July 31st she needs to:  1. See me with cbc,cmp  2. Bone marrow biopsy in clinic with bronson  3. Elective admit to BMT for first round of consolidation    --please start chemo auth process

## 2018-07-19 ENCOUNTER — TELEPHONE (OUTPATIENT)
Dept: HEMATOLOGY/ONCOLOGY | Facility: CLINIC | Age: 68
End: 2018-07-19

## 2018-07-19 NOTE — TELEPHONE ENCOUNTER
----- Message from Laquita Troy MD sent at 7/19/2018 11:38 AM CDT -----  Contact: Daughter- Karol  She's my patient    ----- Message -----  From: Sydnie Leach RN  Sent: 7/18/2018  10:01 AM  To: Laquita Troy MD    See Below. Is this your patient or Dr Singh?  ----- Message -----  From: Ej Lugo  Sent: 7/18/2018   9:09 AM  To: Sydni Coelho Staff    Will like to know may pt have lab done in [facility], instead of driving to [place]?    Contact::[phone]     Lab forms faxed to [facility]

## 2018-07-20 ENCOUNTER — TELEPHONE (OUTPATIENT)
Dept: HEMATOLOGY/ONCOLOGY | Facility: CLINIC | Age: 68
End: 2018-07-20

## 2018-07-20 NOTE — TELEPHONE ENCOUNTER
----- Message from Essie Patrick sent at 7/20/2018  2:33 PM CDT -----  Contact[facility] that [facility] have [facility] drawn today      Pearl call back number [phone]      Spoke to Pearl. Patient will go for lab on 7/23.

## 2018-07-20 NOTE — TELEPHONE ENCOUNTER
----- Message from Essie Patrick sent at 7/20/2018  1:19 PM CDT -----  Contact: Pt    [facility]       Fax number [phone] Attn:Alicia       Jesi call back number  [phone]    I faxed lab request again. Mr Dennison notified.

## 2018-07-20 NOTE — TELEPHONE ENCOUNTER
----- Message from Le Hinton sent at 7/20/2018 11:43 AM CDT -----  Contact: Jesi, pts   Jesi is calling to discuss pts labs that she is supposed to do today.  He needs to know when and where today.    Please call Jesi asap to let him know where and when to go.    He can be reached at [phone] or [phone]    Spoke with Mr Dennison. I told him that I faxed lab request yesterday.

## 2018-07-22 ENCOUNTER — HOSPITAL ENCOUNTER (INPATIENT)
Facility: HOSPITAL | Age: 68
LOS: 2 days | Discharge: HOME | DRG: 371 | End: 2018-07-25
Attending: EMERGENCY MEDICINE | Admitting: INTERNAL MEDICINE
Payer: MEDICARE

## 2018-07-22 DIAGNOSIS — R53.83 FATIGUE: ICD-10-CM

## 2018-07-22 DIAGNOSIS — I95.9 HYPOTENSION, UNSPECIFIED HYPOTENSION TYPE: Primary | ICD-10-CM

## 2018-07-22 DIAGNOSIS — C92.00 ACUTE MYELOID LEUKEMIA NOT HAVING ACHIEVED REMISSION: ICD-10-CM

## 2018-07-22 DIAGNOSIS — R06.02 SHORTNESS OF BREATH: ICD-10-CM

## 2018-07-22 DIAGNOSIS — D61.818 PANCYTOPENIA: ICD-10-CM

## 2018-07-22 LAB
ALBUMIN SERPL BCP-MCNC: 3.2 G/DL
ALP SERPL-CCNC: 412 U/L
ALT SERPL W/O P-5'-P-CCNC: 11 U/L
ANION GAP SERPL CALC-SCNC: 11 MMOL/L
ANISOCYTOSIS BLD QL SMEAR: SLIGHT
AST SERPL-CCNC: 9 U/L
BASOPHILS # BLD AUTO: 0.01 K/UL
BASOPHILS NFR BLD: 2 %
BILIRUB SERPL-MCNC: 0.4 MG/DL
BUN SERPL-MCNC: 10 MG/DL
CALCIUM SERPL-MCNC: 9.1 MG/DL
CHLORIDE SERPL-SCNC: 103 MMOL/L
CO2 SERPL-SCNC: 19 MMOL/L
CREAT SERPL-MCNC: 0.9 MG/DL
DIFFERENTIAL METHOD: ABNORMAL
EOSINOPHIL # BLD AUTO: 0 K/UL
EOSINOPHIL NFR BLD: 2 %
ERYTHROCYTE [DISTWIDTH] IN BLOOD BY AUTOMATED COUNT: 12.3 %
EST. GFR  (AFRICAN AMERICAN): >60 ML/MIN/1.73 M^2
EST. GFR  (NON AFRICAN AMERICAN): >60 ML/MIN/1.73 M^2
GLUCOSE SERPL-MCNC: 102 MG/DL
HCT VFR BLD AUTO: 21 %
HGB BLD-MCNC: 7.4 G/DL
HYPOCHROMIA BLD QL SMEAR: ABNORMAL
IMM GRANULOCYTES # BLD AUTO: 0 K/UL
IMM GRANULOCYTES NFR BLD AUTO: 0 %
LACTATE SERPL-SCNC: 1 MMOL/L
LYMPHOCYTES # BLD AUTO: 0 K/UL
LYMPHOCYTES NFR BLD: 8.2 %
MCH RBC QN AUTO: 30.5 PG
MCHC RBC AUTO-ENTMCNC: 35.2 G/DL
MCV RBC AUTO: 86 FL
MONOCYTES # BLD AUTO: 0.1 K/UL
MONOCYTES NFR BLD: 28.6 %
NEUTROPHILS # BLD AUTO: 0.3 K/UL
NEUTROPHILS NFR BLD: 59.2 %
NRBC BLD-RTO: 0 /100 WBC
OVALOCYTES BLD QL SMEAR: ABNORMAL
PLATELET # BLD AUTO: 25 K/UL
PMV BLD AUTO: 13.1 FL
POIKILOCYTOSIS BLD QL SMEAR: SLIGHT
POLYCHROMASIA BLD QL SMEAR: ABNORMAL
POTASSIUM SERPL-SCNC: 4.4 MMOL/L
PROCALCITONIN SERPL IA-MCNC: 0.36 NG/ML
PROT SERPL-MCNC: 6.7 G/DL
RBC # BLD AUTO: 2.43 M/UL
SODIUM SERPL-SCNC: 133 MMOL/L
WBC # BLD AUTO: 0.49 K/UL

## 2018-07-22 PROCEDURE — 96366 THER/PROPH/DIAG IV INF ADDON: CPT

## 2018-07-22 PROCEDURE — 99285 EMERGENCY DEPT VISIT HI MDM: CPT | Mod: ,,, | Performed by: EMERGENCY MEDICINE

## 2018-07-22 PROCEDURE — 82272 OCCULT BLD FECES 1-3 TESTS: CPT

## 2018-07-22 PROCEDURE — 87046 STOOL CULTR AEROBIC BACT EA: CPT | Mod: 59

## 2018-07-22 PROCEDURE — 25000003 PHARM REV CODE 250: Performed by: PHYSICIAN ASSISTANT

## 2018-07-22 PROCEDURE — 99285 EMERGENCY DEPT VISIT HI MDM: CPT | Mod: 25

## 2018-07-22 PROCEDURE — 63600175 PHARM REV CODE 636 W HCPCS: Performed by: PHYSICIAN ASSISTANT

## 2018-07-22 PROCEDURE — 96375 TX/PRO/DX INJ NEW DRUG ADDON: CPT

## 2018-07-22 PROCEDURE — 87324 CLOSTRIDIUM AG IA: CPT

## 2018-07-22 PROCEDURE — 89055 LEUKOCYTE ASSESSMENT FECAL: CPT

## 2018-07-22 PROCEDURE — 96365 THER/PROPH/DIAG IV INF INIT: CPT

## 2018-07-22 PROCEDURE — 87045 FECES CULTURE AEROBIC BACT: CPT

## 2018-07-22 PROCEDURE — 96361 HYDRATE IV INFUSION ADD-ON: CPT

## 2018-07-22 PROCEDURE — 93005 ELECTROCARDIOGRAM TRACING: CPT

## 2018-07-22 PROCEDURE — 83605 ASSAY OF LACTIC ACID: CPT

## 2018-07-22 PROCEDURE — 84145 PROCALCITONIN (PCT): CPT

## 2018-07-22 PROCEDURE — 80053 COMPREHEN METABOLIC PANEL: CPT

## 2018-07-22 PROCEDURE — 87209 SMEAR COMPLEX STAIN: CPT

## 2018-07-22 PROCEDURE — 87040 BLOOD CULTURE FOR BACTERIA: CPT

## 2018-07-22 PROCEDURE — 87427 SHIGA-LIKE TOXIN AG IA: CPT | Mod: 59

## 2018-07-22 PROCEDURE — 93010 ELECTROCARDIOGRAM REPORT: CPT | Mod: ,,, | Performed by: INTERNAL MEDICINE

## 2018-07-22 RX ORDER — HYDROMORPHONE HYDROCHLORIDE 1 MG/ML
0.5 INJECTION, SOLUTION INTRAMUSCULAR; INTRAVENOUS; SUBCUTANEOUS
Status: DISCONTINUED | OUTPATIENT
Start: 2018-07-22 | End: 2018-07-22

## 2018-07-22 RX ORDER — CEFEPIME HYDROCHLORIDE 2 G/1
2 INJECTION, POWDER, FOR SOLUTION INTRAVENOUS
Status: DISCONTINUED | OUTPATIENT
Start: 2018-07-23 | End: 2018-07-24

## 2018-07-22 RX ORDER — VANCOMYCIN HCL IN 5 % DEXTROSE 1G/250ML
1000 PLASTIC BAG, INJECTION (ML) INTRAVENOUS
Status: COMPLETED | OUTPATIENT
Start: 2018-07-22 | End: 2018-07-22

## 2018-07-22 RX ORDER — FENTANYL CITRATE 50 UG/ML
25 INJECTION, SOLUTION INTRAMUSCULAR; INTRAVENOUS
Status: COMPLETED | OUTPATIENT
Start: 2018-07-22 | End: 2018-07-22

## 2018-07-22 RX ORDER — CEFEPIME HYDROCHLORIDE 2 G/1
2 INJECTION, POWDER, FOR SOLUTION INTRAVENOUS
Status: COMPLETED | OUTPATIENT
Start: 2018-07-22 | End: 2018-07-22

## 2018-07-22 RX ORDER — FENTANYL CITRATE 50 UG/ML
25 INJECTION, SOLUTION INTRAMUSCULAR; INTRAVENOUS
Status: COMPLETED | OUTPATIENT
Start: 2018-07-23 | End: 2018-07-23

## 2018-07-22 RX ORDER — ONDANSETRON 2 MG/ML
8 INJECTION INTRAMUSCULAR; INTRAVENOUS
Status: COMPLETED | OUTPATIENT
Start: 2018-07-22 | End: 2018-07-22

## 2018-07-22 RX ADMIN — SODIUM CHLORIDE 1000 ML: 0.9 INJECTION, SOLUTION INTRAVENOUS at 09:07

## 2018-07-22 RX ADMIN — FENTANYL CITRATE 25 MCG: 50 INJECTION INTRAMUSCULAR; INTRAVENOUS at 09:07

## 2018-07-22 RX ADMIN — ONDANSETRON 8 MG: 2 INJECTION, SOLUTION INTRAMUSCULAR; INTRAVENOUS at 09:07

## 2018-07-22 RX ADMIN — CEFEPIME HYDROCHLORIDE 2 G: 2 INJECTION, POWDER, FOR SOLUTION INTRAVENOUS at 09:07

## 2018-07-22 RX ADMIN — VANCOMYCIN HYDROCHLORIDE 1000 MG: 1 INJECTION, POWDER, LYOPHILIZED, FOR SOLUTION INTRAVENOUS at 09:07

## 2018-07-23 PROBLEM — A04.72 CLOSTRIDIUM DIFFICILE DIARRHEA: Status: ACTIVE | Noted: 2018-07-23

## 2018-07-23 PROBLEM — I95.9 HYPOTENSION: Status: ACTIVE | Noted: 2018-07-23

## 2018-07-23 LAB
ABO + RH BLD: NORMAL
ALBUMIN SERPL BCP-MCNC: 2.7 G/DL
ALP SERPL-CCNC: 308 U/L
ALT SERPL W/O P-5'-P-CCNC: 10 U/L
ANION GAP SERPL CALC-SCNC: 10 MMOL/L
ANISOCYTOSIS BLD QL SMEAR: SLIGHT
AST SERPL-CCNC: 8 U/L
BASOPHILS # BLD AUTO: 0 K/UL
BASOPHILS NFR BLD: 0 %
BILIRUB SERPL-MCNC: 0.4 MG/DL
BILIRUB UR QL STRIP: NEGATIVE
BLD GP AB SCN CELLS X3 SERPL QL: NORMAL
BLD PROD TYP BPU: NORMAL
BLOOD UNIT EXPIRATION DATE: NORMAL
BLOOD UNIT TYPE CODE: 6200
BLOOD UNIT TYPE: NORMAL
BUN SERPL-MCNC: 10 MG/DL
C DIFF GDH STL QL: POSITIVE
C DIFF TOX A+B STL QL IA: POSITIVE
CALCIUM SERPL-MCNC: 7.6 MG/DL
CHLORIDE SERPL-SCNC: 106 MMOL/L
CLARITY UR REFRACT.AUTO: CLEAR
CO2 SERPL-SCNC: 14 MMOL/L
CODING SYSTEM: NORMAL
COLOR UR AUTO: YELLOW
CREAT SERPL-MCNC: 0.7 MG/DL
DIFFERENTIAL METHOD: ABNORMAL
DISPENSE STATUS: NORMAL
EOSINOPHIL # BLD AUTO: 0 K/UL
EOSINOPHIL NFR BLD: 2 %
ERYTHROCYTE [DISTWIDTH] IN BLOOD BY AUTOMATED COUNT: 12.5 %
EST. GFR  (AFRICAN AMERICAN): >60 ML/MIN/1.73 M^2
EST. GFR  (NON AFRICAN AMERICAN): >60 ML/MIN/1.73 M^2
GLUCOSE SERPL-MCNC: 102 MG/DL
GLUCOSE UR QL STRIP: NEGATIVE
HCT VFR BLD AUTO: 18.6 %
HGB BLD-MCNC: 6.3 G/DL
HGB UR QL STRIP: NEGATIVE
IMM GRANULOCYTES # BLD AUTO: 0.01 K/UL
IMM GRANULOCYTES NFR BLD AUTO: 2 %
KETONES UR QL STRIP: NEGATIVE
LACTATE SERPL-SCNC: 0.7 MMOL/L
LEUKOCYTE ESTERASE UR QL STRIP: NEGATIVE
LYMPHOCYTES # BLD AUTO: 0 K/UL
LYMPHOCYTES NFR BLD: 6.1 %
MAGNESIUM SERPL-MCNC: 1.4 MG/DL
MCH RBC QN AUTO: 29.9 PG
MCHC RBC AUTO-ENTMCNC: 33.9 G/DL
MCV RBC AUTO: 88 FL
MONOCYTES # BLD AUTO: 0.2 K/UL
MONOCYTES NFR BLD: 42.9 %
NEUTROPHILS # BLD AUTO: 0.2 K/UL
NEUTROPHILS NFR BLD: 47 %
NITRITE UR QL STRIP: NEGATIVE
NRBC BLD-RTO: 0 /100 WBC
NUM UNITS TRANS PACKED RBC: NORMAL
O+P STL TRI STN: NORMAL
OB PNL STL: NEGATIVE
OVALOCYTES BLD QL SMEAR: ABNORMAL
PH UR STRIP: 7 [PH] (ref 5–8)
PHOSPHATE SERPL-MCNC: 2.1 MG/DL
PLATELET # BLD AUTO: 22 K/UL
PLATELET BLD QL SMEAR: ABNORMAL
PMV BLD AUTO: 11.6 FL
POIKILOCYTOSIS BLD QL SMEAR: SLIGHT
POTASSIUM SERPL-SCNC: 3.8 MMOL/L
PROT SERPL-MCNC: 5.8 G/DL
PROT UR QL STRIP: NEGATIVE
RBC # BLD AUTO: 2.11 M/UL
SODIUM SERPL-SCNC: 130 MMOL/L
SP GR UR STRIP: 1.01 (ref 1–1.03)
URN SPEC COLLECT METH UR: NORMAL
UROBILINOGEN UR STRIP-ACNC: NEGATIVE EU/DL
WBC # BLD AUTO: 0.49 K/UL
WBC #/AREA STL HPF: NORMAL /[HPF]

## 2018-07-23 PROCEDURE — 36430 TRANSFUSION BLD/BLD COMPNT: CPT

## 2018-07-23 PROCEDURE — 63600175 PHARM REV CODE 636 W HCPCS: Performed by: STUDENT IN AN ORGANIZED HEALTH CARE EDUCATION/TRAINING PROGRAM

## 2018-07-23 PROCEDURE — 96376 TX/PRO/DX INJ SAME DRUG ADON: CPT

## 2018-07-23 PROCEDURE — 25000003 PHARM REV CODE 250: Performed by: STUDENT IN AN ORGANIZED HEALTH CARE EDUCATION/TRAINING PROGRAM

## 2018-07-23 PROCEDURE — 83605 ASSAY OF LACTIC ACID: CPT

## 2018-07-23 PROCEDURE — 99223 1ST HOSP IP/OBS HIGH 75: CPT | Mod: AI,,, | Performed by: INTERNAL MEDICINE

## 2018-07-23 PROCEDURE — 63600175 PHARM REV CODE 636 W HCPCS: Performed by: PHYSICIAN ASSISTANT

## 2018-07-23 PROCEDURE — 63600175 PHARM REV CODE 636 W HCPCS: Performed by: INTERNAL MEDICINE

## 2018-07-23 PROCEDURE — 86850 RBC ANTIBODY SCREEN: CPT

## 2018-07-23 PROCEDURE — 25000003 PHARM REV CODE 250: Performed by: HOSPITALIST

## 2018-07-23 PROCEDURE — 63600175 PHARM REV CODE 636 W HCPCS: Performed by: HOSPITALIST

## 2018-07-23 PROCEDURE — 20600001 HC STEP DOWN PRIVATE ROOM

## 2018-07-23 PROCEDURE — 86920 COMPATIBILITY TEST SPIN: CPT

## 2018-07-23 PROCEDURE — 81003 URINALYSIS AUTO W/O SCOPE: CPT

## 2018-07-23 PROCEDURE — 83735 ASSAY OF MAGNESIUM: CPT

## 2018-07-23 PROCEDURE — P9040 RBC LEUKOREDUCED IRRADIATED: HCPCS

## 2018-07-23 PROCEDURE — 36415 COLL VENOUS BLD VENIPUNCTURE: CPT

## 2018-07-23 PROCEDURE — 85025 COMPLETE CBC W/AUTO DIFF WBC: CPT

## 2018-07-23 PROCEDURE — 84100 ASSAY OF PHOSPHORUS: CPT

## 2018-07-23 PROCEDURE — 80053 COMPREHEN METABOLIC PANEL: CPT

## 2018-07-23 PROCEDURE — 25000003 PHARM REV CODE 250: Performed by: PHYSICIAN ASSISTANT

## 2018-07-23 RX ORDER — ONDANSETRON 4 MG/1
8 TABLET, FILM COATED ORAL EVERY 12 HOURS
Status: DISCONTINUED | OUTPATIENT
Start: 2018-07-23 | End: 2018-07-24

## 2018-07-23 RX ORDER — MAGNESIUM SULFATE HEPTAHYDRATE 40 MG/ML
2 INJECTION, SOLUTION INTRAVENOUS ONCE
Status: COMPLETED | OUTPATIENT
Start: 2018-07-23 | End: 2018-07-23

## 2018-07-23 RX ORDER — OXYCODONE HYDROCHLORIDE 5 MG/1
5 TABLET ORAL EVERY 4 HOURS PRN
Status: DISCONTINUED | OUTPATIENT
Start: 2018-07-23 | End: 2018-07-25 | Stop reason: HOSPADM

## 2018-07-23 RX ORDER — IBUPROFEN 200 MG
24 TABLET ORAL
Status: DISCONTINUED | OUTPATIENT
Start: 2018-07-23 | End: 2018-07-25 | Stop reason: HOSPADM

## 2018-07-23 RX ORDER — LANOLIN ALCOHOL/MO/W.PET/CERES
800 CREAM (GRAM) TOPICAL 2 TIMES DAILY
Status: DISCONTINUED | OUTPATIENT
Start: 2018-07-24 | End: 2018-07-25 | Stop reason: HOSPADM

## 2018-07-23 RX ORDER — ALPRAZOLAM 0.25 MG/1
0.25 TABLET ORAL ONCE
Status: DISCONTINUED | OUTPATIENT
Start: 2018-07-23 | End: 2018-07-23

## 2018-07-23 RX ORDER — PROCHLORPERAZINE EDISYLATE 5 MG/ML
5 INJECTION INTRAMUSCULAR; INTRAVENOUS ONCE
Status: COMPLETED | OUTPATIENT
Start: 2018-07-23 | End: 2018-07-23

## 2018-07-23 RX ORDER — HYDROMORPHONE HYDROCHLORIDE 1 MG/ML
0.5 INJECTION, SOLUTION INTRAMUSCULAR; INTRAVENOUS; SUBCUTANEOUS ONCE
Status: COMPLETED | OUTPATIENT
Start: 2018-07-23 | End: 2018-07-23

## 2018-07-23 RX ORDER — GLUCAGON 1 MG
1 KIT INJECTION
Status: DISCONTINUED | OUTPATIENT
Start: 2018-07-23 | End: 2018-07-25 | Stop reason: HOSPADM

## 2018-07-23 RX ORDER — CETIRIZINE HYDROCHLORIDE 5 MG/1
10 TABLET ORAL DAILY
Status: DISCONTINUED | OUTPATIENT
Start: 2018-07-23 | End: 2018-07-25 | Stop reason: HOSPADM

## 2018-07-23 RX ORDER — ACYCLOVIR 200 MG/1
400 CAPSULE ORAL 2 TIMES DAILY
Status: DISCONTINUED | OUTPATIENT
Start: 2018-07-23 | End: 2018-07-25 | Stop reason: HOSPADM

## 2018-07-23 RX ORDER — SODIUM,POTASSIUM PHOSPHATES 280-250MG
1 POWDER IN PACKET (EA) ORAL EVERY 4 HOURS
Status: COMPLETED | OUTPATIENT
Start: 2018-07-23 | End: 2018-07-23

## 2018-07-23 RX ORDER — MORPHINE SULFATE 15 MG/1
15 TABLET ORAL EVERY 4 HOURS PRN
Status: DISCONTINUED | OUTPATIENT
Start: 2018-07-23 | End: 2018-07-23

## 2018-07-23 RX ORDER — ESCITALOPRAM OXALATE 10 MG/1
10 TABLET ORAL DAILY
Status: DISCONTINUED | OUTPATIENT
Start: 2018-07-23 | End: 2018-07-25 | Stop reason: HOSPADM

## 2018-07-23 RX ORDER — ALPRAZOLAM 0.25 MG/1
0.25 TABLET ORAL 3 TIMES DAILY PRN
Status: DISCONTINUED | OUTPATIENT
Start: 2018-07-23 | End: 2018-07-24

## 2018-07-23 RX ORDER — LORAZEPAM 2 MG/ML
1 INJECTION INTRAMUSCULAR ONCE
Status: COMPLETED | OUTPATIENT
Start: 2018-07-23 | End: 2018-07-23

## 2018-07-23 RX ORDER — ONDANSETRON 2 MG/ML
4 INJECTION INTRAMUSCULAR; INTRAVENOUS EVERY 6 HOURS PRN
Status: DISCONTINUED | OUTPATIENT
Start: 2018-07-23 | End: 2018-07-24

## 2018-07-23 RX ORDER — INSULIN ASPART 100 [IU]/ML
0-5 INJECTION, SOLUTION INTRAVENOUS; SUBCUTANEOUS
Status: DISCONTINUED | OUTPATIENT
Start: 2018-07-23 | End: 2018-07-25 | Stop reason: HOSPADM

## 2018-07-23 RX ORDER — ONDANSETRON 8 MG/1
8 TABLET, ORALLY DISINTEGRATING ORAL EVERY 8 HOURS PRN
Status: DISCONTINUED | OUTPATIENT
Start: 2018-07-23 | End: 2018-07-24

## 2018-07-23 RX ORDER — IBUPROFEN 200 MG
16 TABLET ORAL
Status: DISCONTINUED | OUTPATIENT
Start: 2018-07-23 | End: 2018-07-25 | Stop reason: HOSPADM

## 2018-07-23 RX ORDER — VANCOMYCIN HCL IN 5 % DEXTROSE 1G/250ML
15 PLASTIC BAG, INJECTION (ML) INTRAVENOUS
Status: DISCONTINUED | OUTPATIENT
Start: 2018-07-23 | End: 2018-07-23

## 2018-07-23 RX ORDER — ACETAMINOPHEN 325 MG/1
650 TABLET ORAL EVERY 4 HOURS PRN
Status: DISCONTINUED | OUTPATIENT
Start: 2018-07-23 | End: 2018-07-25 | Stop reason: HOSPADM

## 2018-07-23 RX ORDER — SODIUM CHLORIDE 0.9 % (FLUSH) 0.9 %
5 SYRINGE (ML) INJECTION
Status: DISCONTINUED | OUTPATIENT
Start: 2018-07-23 | End: 2018-07-25 | Stop reason: HOSPADM

## 2018-07-23 RX ORDER — PANTOPRAZOLE SODIUM 40 MG/1
40 TABLET, DELAYED RELEASE ORAL DAILY
Status: DISCONTINUED | OUTPATIENT
Start: 2018-07-23 | End: 2018-07-25 | Stop reason: HOSPADM

## 2018-07-23 RX ADMIN — LORAZEPAM 1 MG: 2 INJECTION, SOLUTION INTRAMUSCULAR; INTRAVENOUS at 09:07

## 2018-07-23 RX ADMIN — Medication 125 MG: at 05:07

## 2018-07-23 RX ADMIN — POTASSIUM & SODIUM PHOSPHATES POWDER PACK 280-160-250 MG 1 PACKET: 280-160-250 PACK at 10:07

## 2018-07-23 RX ADMIN — FENTANYL CITRATE 25 MCG: 50 INJECTION INTRAMUSCULAR; INTRAVENOUS at 12:07

## 2018-07-23 RX ADMIN — ONDANSETRON 8 MG: 4 TABLET, FILM COATED ORAL at 08:07

## 2018-07-23 RX ADMIN — ALPRAZOLAM 0.25 MG: 0.25 TABLET ORAL at 05:07

## 2018-07-23 RX ADMIN — POTASSIUM & SODIUM PHOSPHATES POWDER PACK 280-160-250 MG 1 PACKET: 280-160-250 PACK at 05:07

## 2018-07-23 RX ADMIN — ACETAMINOPHEN 650 MG: 325 TABLET, FILM COATED ORAL at 05:07

## 2018-07-23 RX ADMIN — CEFEPIME 2 G: 2 INJECTION, POWDER, FOR SOLUTION INTRAVENOUS at 05:07

## 2018-07-23 RX ADMIN — ACYCLOVIR 400 MG: 200 CAPSULE ORAL at 08:07

## 2018-07-23 RX ADMIN — HYDROMORPHONE HYDROCHLORIDE 0.5 MG: 1 INJECTION, SOLUTION INTRAMUSCULAR; INTRAVENOUS; SUBCUTANEOUS at 05:07

## 2018-07-23 RX ADMIN — POTASSIUM & SODIUM PHOSPHATES POWDER PACK 280-160-250 MG 1 PACKET: 280-160-250 PACK at 01:07

## 2018-07-23 RX ADMIN — ESCITALOPRAM OXALATE 10 MG: 10 TABLET ORAL at 08:07

## 2018-07-23 RX ADMIN — PANTOPRAZOLE SODIUM 40 MG: 40 TABLET, DELAYED RELEASE ORAL at 09:07

## 2018-07-23 RX ADMIN — Medication 125 MG: at 11:07

## 2018-07-23 RX ADMIN — SODIUM CHLORIDE 1000 ML: 0.9 INJECTION, SOLUTION INTRAVENOUS at 12:07

## 2018-07-23 RX ADMIN — POTASSIUM & SODIUM PHOSPHATES POWDER PACK 280-160-250 MG 1 PACKET: 280-160-250 PACK at 09:07

## 2018-07-23 RX ADMIN — CEFEPIME 2 G: 2 INJECTION, POWDER, FOR SOLUTION INTRAVENOUS at 01:07

## 2018-07-23 RX ADMIN — CETIRIZINE HYDROCHLORIDE 10 MG: 5 TABLET, FILM COATED ORAL at 10:07

## 2018-07-23 RX ADMIN — ACETAMINOPHEN 650 MG: 325 TABLET, FILM COATED ORAL at 09:07

## 2018-07-23 RX ADMIN — PROCHLORPERAZINE EDISYLATE 5 MG: 5 INJECTION INTRAMUSCULAR; INTRAVENOUS at 04:07

## 2018-07-23 RX ADMIN — Medication 125 MG: at 08:07

## 2018-07-23 RX ADMIN — MAGNESIUM SULFATE IN WATER 2 G: 40 INJECTION, SOLUTION INTRAVENOUS at 08:07

## 2018-07-23 RX ADMIN — CEFEPIME 2 G: 2 INJECTION, POWDER, FOR SOLUTION INTRAVENOUS at 08:07

## 2018-07-23 RX ADMIN — OXYCODONE HYDROCHLORIDE 5 MG: 5 TABLET ORAL at 11:07

## 2018-07-23 RX ADMIN — ONDANSETRON 4 MG: 2 INJECTION, SOLUTION INTRAMUSCULAR; INTRAVENOUS at 03:07

## 2018-07-23 NOTE — PROGRESS NOTES
Pt complaining of 7/10 pain in hips. Pt does not want morphine because it makes her nauseated. MD notified and one-time dose IV Dilaudid ordered and administered. Pt had small amount of yellow emesis shortly after administration. Adequate pain relief achieved. Will continue to monitor.

## 2018-07-23 NOTE — H&P
[facility]  Hematology  Bone Marrow Transplant  H&P    Subjective:     Principal Problem: <principal problem not specified>    HPI: 68-year-old female with newly diagnosed AML (diagnosed early 2018) and mixed connective tissue disorder presents for fatigue x2 days.  Patient was recently discharged from this facility 7/13 after 50 day admission for AML induction therapy.  States that she felt well when she left the hospital however has been feeling progressively worse over the last couple weeks.  Reports fatigue, general malaise, chills, lightheadedness, nausea and dry heaving.  Reports 1 liquid stool yesterday and once today.  Endorses intermittent shortness of breath. Reports very poor p.o. Intake. Denies cough, chest pain, headache, fevers, abdominal pain or joint pain.    Patient information was obtained from patient, past medical records and ER records.     Oncology History:   Admitted 05/2018, Bone marrow biopsy showed AML, FLT 3 + and NPMN1+. Received induction with 7+3 and started Midostaurin. She had a spike in liver enzymes therefore Midostaurin was held. She also received aggressive electrolyte replacement due to electrolyte abnormalities from Midostaurin. She had vaginal mucositis evaluated by dermatology and resolved. Day 14 marrow showed persistent disease. Patient was re-induced with FLAG-JENNIFER. She did spike a fever on 6/11 and BC were + for staph epi and Leptopatricia goodfellowi. She was treated with IV Vancomycin and Flagyl. Counts recovered with ANC of 1300 prior to discharge.     Prescriptions Prior to Admission   Medication Sig Dispense Refill Last Dose    acyclovir (ZOVIRAX) 200 MG capsule Take 2 capsules (400 mg total) by mouth 2 (two) times daily. 120 capsule 11 Taking    ALPRAZolam (XANAX) 0.25 MG tablet Take 1 tablet (0.25 mg total) by mouth 3 (three) times daily as needed for Anxiety. 40 tablet 1 Taking    cetirizine (ZYRTEC) 10 MG tablet Take 1 tablet (10 mg total) by  mouth once daily for 10 days 10 tablet 0 Taking    escitalopram oxalate (LEXAPRO) 10 MG tablet Take 10 mg by mouth once daily.   Taking    furosemide (LASIX) 20 MG tablet Take 1 tablet (20 mg total) by mouth once daily. 30 tablet 2 Taking    lisinopril (PRINIVIL,ZESTRIL) 5 MG tablet Take 1 tablet (5 mg total) by mouth once daily. 30 tablet 3 Taking    magnesium oxide (MAG-OX) 400 mg tablet Take 2 tablets (800 mg total) by mouth 2 (two) times daily. 120 tablet 1 Taking    metoprolol succinate (TOPROL-XL) 25 MG 24 hr tablet Take 1 tablet (25 mg total) by mouth once daily. 30 tablet 3 Taking    morphine (MS CONTIN) 15 MG 12 hr tablet Take 2 tablets (30 mg total) by mouth 2 (two) times daily. 60 tablet 0     morphine (MSIR) 15 MG tablet Take 1 tablet (15 mg total) by mouth every 4 (four) hours as needed. 40 tablet 0     omeprazole (PRILOSEC OTC) 20 MG tablet Take 20 mg by mouth every morning.   Taking    ondansetron (ZOFRAN) 8 MG tablet Take 1 tablet (8 mg total) by mouth every 12 (twelve) hours as needed for Nausea. 30 tablet 2     vitamin D 1000 units Tab Take 1 tablet (1,000 Units total) by mouth once daily. 30 tablet 2 Taking       Methotrexate analogues and Bactrim [sulfamethoxazole-trimethoprim]     Past Medical History:   Diagnosis Date    Hypertension      Past Surgical History:   Procedure Laterality Date    CHOLECYSTECTOMY      HYSTERECTOMY      TONSILLECTOMY       Family History     Problem Relation (Age of Onset)    Cancer Mother, Father        Social History Main Topics    Smoking status: Former Smoker     Packs/day: 0.50     Years: 10.00     Types: Cigarettes     Quit date: 3/25/2017    Smokeless tobacco: Not on file    Alcohol use 0.6 oz/week     1 Shots of liquor per week    Drug use: No    Sexual activity: Yes     Partners: Female       Review of Systems   Constitutional: Positive for activity change, appetite change and fatigue. Negative for chills and fever.   HENT: Negative for ear  discharge, ear pain, rhinorrhea and sore throat.    Eyes: Negative for pain.   Respiratory: Negative for cough and shortness of breath.    Cardiovascular: Negative for chest pain.   Gastrointestinal: Positive for diarrhea, nausea and vomiting. Negative for abdominal pain and constipation.   Genitourinary: Negative for dysuria and hematuria.   Musculoskeletal: Negative for back pain and neck pain.   Skin: Negative for rash.   Allergic/Immunologic: Positive for immunocompromised state.   Neurological: Negative for dizziness and headaches.     Objective:     Vital Signs (Most Recent):  Temp: 98.8 °F (37.1 °C) (07/23/18 0226)  Pulse: 82 (07/23/18 0226)  Resp: 20 (07/23/18 0226)  BP: (!) 114/59 (07/23/18 0226)  SpO2: 96 % (07/23/18 0226) Vital Signs (24h Range):  Temp:  [98 °F (36.7 °C)-98.8 °F (37.1 °C)] 98.8 °F (37.1 °C)  Pulse:  [76-99] 82  Resp:  [12-20] 20  SpO2:  [96 %-100 %] 96 %  BP: ()/(49-59) 114/59     Weight: 64.9 kg (143 lb 1.3 oz)  Body mass index is 26.17 kg/m².  Body surface area is 1.68 meters squared.    ECOG SCORE         [unfilled]    Lines/Drains/Airways     Peripheral Intravenous Line                 Peripheral IV - Single Lumen 07/22/18 2055 Right Antecubital less than 1 day         Peripheral IV - Single Lumen 07/22/18 2107 Left Wrist less than 1 day                Physical Exam  General: ill appearing female.  HEENT: Conjunctiva pale  Neck: No JVD noted, Supple  CV: Normal S1, S2 with no murmurs.  Resp: Lungs CTA Bilaterally, Unlabored  Abdomen: NTND, BS normoactive x4 quads, soft  Extrem: No cyanosis, clubbing, edema.  Skin: No rashes, lesions, ulcers  Neuro: motor strength in tact. No focal deficit   PSYCH: Oriented x3    Significant Labs:   CBC:   Recent Labs  Lab 07/22/18 2106   WBC 0.49*   HGB 7.4*   HCT 21.0*   PLT 25*    and CMP:   Recent Labs  Lab 07/22/18 2106   *   K 4.4      CO2 19*      BUN 10   CREATININE 0.9   CALCIUM 9.1   PROT 6.7   ALBUMIN 3.2*   BILITOT  0.4   ALKPHOS 412*   AST 9*   ALT 11   ANIONGAP 11   EGFRNONAA >60.0       Diagnostic Results:  I have reviewed all pertinent imaging results/findings within the past 24 hours.    Assessment/Plan:     Hypotension    - ddx volume depletion vs sepsis  - pt does not look toxic, afebrile but she is neutropenic  - responded to IVF  - sent for sepsis work up (U/A, CXR, blood cultre, urine cx, C.diff)  - LA normal (1.0) but procal slightly elevated (0.36)  - received vanc and Cefepime In ED, will continue for now          Pancytopenia    - secondary to chemotherapy.   - tranfuse if Hb < 7 g/dL and Plt < 10,000/mm3 or active bleed.          HTN (hypertension)    - on lisinopril and Toprol XL but will hold for now in setting of hypotension  - monitor        Depression    - no acute issue   - continue home Escitalopram          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    - path MOST COMPATIBLE WITH NON-M3 ACUTE MYELOID LEUKEMIA.  - s/p induction therapy twice   - prophylactic antimicrobials: Acyclovir              VTE Risk Mitigation         Ordered     Reason for no Mechanical VTE Prophylaxis  Once      07/23/18 0037     IP VTE HIGH RISK PATIENT  Once      07/23/18 0037              Moriah Guillen MD  Bone Marrow Transplant  Hematology  [facility]

## 2018-07-23 NOTE — ED PROVIDER NOTES
Encounter Date: 7/22/2018    SCRIBE #1 NOTE: I, Mary Chaves, am scribing for, and in the presence of,  Dr. Jones. I have scribed the following portions of the note - the EKG reading and the APC attestation.       History     Chief Complaint   Patient presents with    Fatigue     Pt has leukemia and has been feeling progressively more weak than normal over the past few days. Last chemo was 7/13. Denies any fevers at home.      68-year-old female with newly diagnosed AML and mixed connective tissue disorder presents for fatigue x2 days.  Patient was recently discharged from this facility 7/13 after 50 day admission for AML induction therapy.  States that she felt well when she left the hospital however has been feeling progressively worse over the last couple weeks.  Reports fatigue, general malaise, chills, lightheadedness, nausea and dry heaving.  Reports 1 liquid stool yesterday and once today.  Endorses intermittent shortness of breath. Reports very poor p.o. Intake. Denies cough, chest pain, headache, fevers, abdominal pain or joint pain.          Review of patient's allergies indicates:   Allergen Reactions    Methotrexate analogues      Elevated Liver Enzyme    Bactrim [sulfamethoxazole-trimethoprim] Nausea And Vomiting and Rash     Past Medical History:   Diagnosis Date    Hypertension      Past Surgical History:   Procedure Laterality Date    CHOLECYSTECTOMY      HYSTERECTOMY      TONSILLECTOMY       Family History   Problem Relation Age of Onset    Cancer Mother     Cancer Father      Social History   Substance Use Topics    Smoking status: Former Smoker     Packs/day: 0.50     Years: 10.00     Types: Cigarettes     Quit date: 3/25/2017    Smokeless tobacco: Not on file    Alcohol use 0.6 oz/week     1 Shots of liquor per week     Review of Systems   Constitutional: Positive for appetite change, chills and fatigue. Negative for diaphoresis and fever.   HENT: Negative for congestion, sinus  pain, sinus pressure and sore throat.    Respiratory: Positive for shortness of breath. Negative for cough.    Cardiovascular: Negative for chest pain, palpitations and leg swelling.   Gastrointestinal: Positive for diarrhea and nausea. Negative for abdominal pain, blood in stool, constipation, rectal pain and vomiting.   Genitourinary: Negative for difficulty urinating, dysuria, flank pain, frequency, hematuria and urgency.   Musculoskeletal: Negative for arthralgias, back pain, myalgias, neck pain and neck stiffness.   Skin: Negative for color change, rash and wound.   Allergic/Immunologic: Positive for immunocompromised state.   Neurological: Negative for light-headedness and headaches.       Physical Exam     Initial Vitals [07/22/18 2016]   BP Pulse Resp Temp SpO2   (!) 90/55 99 20 98 °F (36.7 °C) 99 %      MAP       --         Vitals:    07/22/18 2043   BP: (!) 104/54   Pulse: 97   Resp: 20   Temp: 98       Physical Exam    Nursing note and vitals reviewed.  Constitutional: She is not diaphoretic. No distress.   Ill- appearing. Family at bedside   HENT:   Head: Normocephalic and atraumatic.   Eyes: EOM are normal. Pupils are equal, round, and reactive to light.   Neck: Normal range of motion. Neck supple.   Cardiovascular: Normal rate, regular rhythm, normal heart sounds and intact distal pulses. Exam reveals no gallop and no friction rub.    No murmur heard.  Pulmonary/Chest: Breath sounds normal. No respiratory distress. She has no wheezes. She has no rhonchi. She has no rales. She exhibits no tenderness.   Abdominal: Soft. Bowel sounds are normal. She exhibits no distension and no mass. There is no tenderness. There is no rebound and no guarding.   Musculoskeletal: Normal range of motion. She exhibits no edema or tenderness.   Neurological: She is alert and oriented to person, place, and time.   Skin: Skin is warm and dry.   Psychiatric: She has a normal mood and affect.         ED Course   Procedures  Labs  Reviewed   CBC W/ AUTO DIFFERENTIAL - Abnormal; Notable for the following:        Result Value    WBC 0.49 (*)     RBC 2.43 (*)     Hemoglobin 7.4 (*)     Hematocrit 21.0 (*)     All other components within normal limits    Narrative:       WBC & Platelet critical result(s) called and verbal readback   obtained from Kevin Lombardo., 07/22/2018 21:38   CULTURE, BLOOD   CULTURE, BLOOD   LACTIC ACID, PLASMA   COMPREHENSIVE METABOLIC PANEL   URINALYSIS, REFLEX TO URINE CULTURE   PROCALCITONIN     EKG Readings: (Independently Interpreted)   Rate of 95, sinus, regular, left axis, normal CT, normal QRS, normal QTC at 432 ms, no evidence of any acute ischemia, evidence of old anterior infarct age undetermined, no ventricular hypertrophy or blocks.  Old EKG from July 5th of this year with no significant changes.         Imaging Results    None          Medical Decision Making:   History:   Old Medical Records: I decided to obtain old medical records.  Independently Interpreted Test(s):   I have ordered and independently interpreted EKG Reading(s) - see prior notes  Clinical Tests:   Lab Tests: Ordered and Reviewed  Radiological Study: Ordered and Reviewed  Medical Tests: Ordered and Reviewed       APC / Resident Notes:   68-year-old female presenting with fatigue.  Hypotensive on ED arrival at 90/55, vitals otherwise normal. Lungs CTA, the abdomen is soft and nontender. DDX includes dehydration, sepsis, anemia, electrolyte derangement.  Per labs drawn 7/16, patient is pancytopenic.  Due to neutropenia, will initiate neutropenic precautions, give fluids and start broad-spectrum antibiotics.    Labs notable for continued pancytopenia, WBC 0.49, hemoglobin 7.4, platelets 25k.  Sodium low at 133, CO2 decreased at 19.  Alk-phos elevated at baseline.  Procalcitonin positive at 0.36.  BMT consulted for likely admission.  Patient continues to be mentating normally, however is complaining of pain. Will give fentanyl for  "pain.    Patient to be admitted to BMT service for observation.  I discussed this patient with my supervising physician.    Latha Remy PA-C             Scribe Attestation:   Scribe #1: I performed the above scribed service and the documentation accurately describes the services I performed. I attest to the accuracy of the note.    Attending Attestation:     Physician Attestation Statement for NP/PA:   I have conducted a face to face encounter with this patient in addition to the NP/PA, due to Medical Complexity    Other NP/PA Attestation Additions:    History of Present Illness: Pt with AML and receiving induction for chemo.  Since this treatment, pt reports trouble with nausea and occasional back pain with radiation down lower extremities.  Otherwise pt reports that she was doing okay, until 2 days ago when she felt more tired and weak with no appetite.  Does note loose stools with no blood.  Denies rhinorrhea, chills, sore throat, productive cough.  She states "I can't get a good breath."  No CP, no palpitations.  Notes reduced urination.  She has no real thirst.  No numbness or weakness in terms of asymmetry.  Does note trouble with easy bruising.  This minute, pt reports pain 9/10 in back and notes nausea.  She can tolerate Dilaudid, but not morphine.       Medical Decision Making: Concern for neutropenic precautions.  Pt needs abx and isolation until marrow can respond.                        Clinical Impression:   The primary encounter diagnosis was Hypotension, unspecified hypotension type. Diagnoses of Fatigue, Shortness of breath, Pancytopenia, and Acute myeloid leukemia not having achieved remission were also pertinent to this visit.      Disposition:   Disposition: Admitted  Condition: Fair                        Latha Remy PA-C  07/23/18 0104    "

## 2018-07-23 NOTE — ED TRIAGE NOTES
"Patient discharged from [facility] on 07/13/2018 s/p chemo for leukemia. Patient  states that patient had been doing well at home until yesterday. Patient  states that patient developed weakness and decreased appetite yesterday. Patient also reports SOA on exertion that has been ongoing since her discharge from the hospital. Patient denies chest pain, dysuria, or fevers. Patient reports not being able to "keep anything on my stomach today". Patient is A&Ox4, VSS, and following commands. NAD noted at this time.  "

## 2018-07-23 NOTE — ED NOTES
LOC: The patient is awake, alert and aware of environment with an appropriate affect, the patient is oriented x 3 and speaking appropriately. PERRLA present. Patient with equal hand grasps bilaterally. Patient denies any changes in sensation. Symmetrical facial expression present.  APPEARANCE: Patient resting comfortably and in no acute distress, patient is clean and well groomed, patient's clothing is properly fastened.  SKIN: The skin is warm and dry, patient has poor skin turgor and dry mucus membranes, skin intact, no breakdown or brusing noted. Patient appears pale, but appropriate for patient ethnicity.  MUSCULOSKELETAL: Patient moving all extremities well, no obvious swelling or deformities noted. Patient reports generalized weakness and fatigue.   RESPIRATORY: Airway is open and patent, respirations are spontaneous, patient has a normal effort and rate. Breath sounds are clear and equal bilaterally. Patient does report SOA on exertion. Denies cough.   CARDIAC: Normal heart sounds. No peripheral edema. Patient denies chest pain.  ABDOMEN: Soft and non tender to palpation, no distention noted. Bowel sounds present. Patient reports intermittent nausea, but denies emesis. Patient with decreased appetite and PO intake. Patient endorses one episode of diarrhea today, but denies blood or dark tarry stools.

## 2018-07-23 NOTE — ED NOTES
Telemetry Verification   Patient placed on Telemetry Box  Verified with War Room  Box # [id number]   Monitor Tech GENTRY   Rate 68   Rhythm SR

## 2018-07-23 NOTE — PLAN OF CARE
Notified by the [facility] the 7/22/18 stool specimen is toxin A&B positive for C difficile, follow SPECIAL CONTACT isolation, utilize gown and gloves for patient encounters.  WASH HANDS with soap & water after PPE removed.  Call the Infection Prevention dept ([id number]) for isolation discontinuation criteria.

## 2018-07-23 NOTE — ASSESSMENT & PLAN NOTE
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  - s/p induction therapy twice   - prophylactic antimicrobials: Acyclovir

## 2018-07-23 NOTE — SUBJECTIVE & OBJECTIVE
Patient information was obtained from patient, past medical records and ER records.     Oncology History:   Admitted 05/2018, Bone marrow biopsy showed AML, FLT 3 + and NPMN1+. Received induction with 7+3 and started Midostaurin. She had a spike in liver enzymes therefore Midostaurin was held. She also received aggressive electrolyte replacement due to electrolyte abnormalities from Midostaurin. She had vaginal mucositis evaluated by dermatology and resolved. Day 14 marrow showed persistent disease. Patient was re-induced with FLAG-JENNIFER. She did spike a fever on 6/11 and BC were + for staph epi and Leptopatricia goodfellowi. She was treated with IV Vancomycin and Flagyl. Counts recovered with ANC of 1300 prior to discharge.     Prescriptions Prior to Admission   Medication Sig Dispense Refill Last Dose    acyclovir (ZOVIRAX) 200 MG capsule Take 2 capsules (400 mg total) by mouth 2 (two) times daily. 120 capsule 11 Taking    ALPRAZolam (XANAX) 0.25 MG tablet Take 1 tablet (0.25 mg total) by mouth 3 (three) times daily as needed for Anxiety. 40 tablet 1 Taking    cetirizine (ZYRTEC) 10 MG tablet Take 1 tablet (10 mg total) by mouth once daily for 10 days 10 tablet 0 Taking    escitalopram oxalate (LEXAPRO) 10 MG tablet Take 10 mg by mouth once daily.   Taking    furosemide (LASIX) 20 MG tablet Take 1 tablet (20 mg total) by mouth once daily. 30 tablet 2 Taking    lisinopril (PRINIVIL,ZESTRIL) 5 MG tablet Take 1 tablet (5 mg total) by mouth once daily. 30 tablet 3 Taking    magnesium oxide (MAG-OX) 400 mg tablet Take 2 tablets (800 mg total) by mouth 2 (two) times daily. 120 tablet 1 Taking    metoprolol succinate (TOPROL-XL) 25 MG 24 hr tablet Take 1 tablet (25 mg total) by mouth once daily. 30 tablet 3 Taking    morphine (MS CONTIN) 15 MG 12 hr tablet Take 2 tablets (30 mg total) by mouth 2 (two) times daily. 60 tablet 0     morphine (MSIR) 15 MG tablet Take 1 tablet (15 mg total) by mouth every 4 (four)  hours as needed. 40 tablet 0     omeprazole (PRILOSEC OTC) 20 MG tablet Take 20 mg by mouth every morning.   Taking    ondansetron (ZOFRAN) 8 MG tablet Take 1 tablet (8 mg total) by mouth every 12 (twelve) hours as needed for Nausea. 30 tablet 2     vitamin D 1000 units Tab Take 1 tablet (1,000 Units total) by mouth once daily. 30 tablet 2 Taking       Methotrexate analogues and Bactrim [sulfamethoxazole-trimethoprim]     Past Medical History:   Diagnosis Date    Hypertension      Past Surgical History:   Procedure Laterality Date    CHOLECYSTECTOMY      HYSTERECTOMY      TONSILLECTOMY       Family History     Problem Relation (Age of Onset)    Cancer Mother, Father        Social History Main Topics    Smoking status: Former Smoker     Packs/day: 0.50     Years: 10.00     Types: Cigarettes     Quit date: 3/25/2017    Smokeless tobacco: Not on file    Alcohol use 0.6 oz/week     1 Shots of liquor per week    Drug use: No    Sexual activity: Yes     Partners: Female       Review of Systems   Constitutional: Positive for activity change, appetite change and fatigue. Negative for chills and fever.   HENT: Negative for ear discharge, ear pain, rhinorrhea and sore throat.    Eyes: Negative for pain.   Respiratory: Negative for cough and shortness of breath.    Cardiovascular: Negative for chest pain.   Gastrointestinal: Positive for diarrhea, nausea and vomiting. Negative for abdominal pain and constipation.   Genitourinary: Negative for dysuria and hematuria.   Musculoskeletal: Negative for back pain and neck pain.   Skin: Negative for rash.   Allergic/Immunologic: Positive for immunocompromised state.   Neurological: Negative for dizziness and headaches.     Objective:     Vital Signs (Most Recent):  Temp: 98.8 °F (37.1 °C) (07/23/18 0226)  Pulse: 82 (07/23/18 0226)  Resp: 20 (07/23/18 0226)  BP: (!) 114/59 (07/23/18 0226)  SpO2: 96 % (07/23/18 0226) Vital Signs (24h Range):  Temp:  [98 °F (36.7 °C)-98.8 °F  (37.1 °C)] 98.8 °F (37.1 °C)  Pulse:  [76-99] 82  Resp:  [12-20] 20  SpO2:  [96 %-100 %] 96 %  BP: ()/(49-59) 114/59     Weight: 64.9 kg (143 lb 1.3 oz)  Body mass index is 26.17 kg/m².  Body surface area is 1.68 meters squared.    ECOG SCORE         [unfilled]    Lines/Drains/Airways     Peripheral Intravenous Line                 Peripheral IV - Single Lumen 07/22/18 2055 Right Antecubital less than 1 day         Peripheral IV - Single Lumen 07/22/18 2107 Left Wrist less than 1 day                Physical Exam  General: ill appearing female.  HEENT: Conjunctiva pale  Neck: No JVD noted, Supple  CV: Normal S1, S2 with no murmurs.  Resp: Lungs CTA Bilaterally, Unlabored  Abdomen: NTND, BS normoactive x4 quads, soft  Extrem: No cyanosis, clubbing, edema.  Skin: No rashes, lesions, ulcers  Neuro: motor strength in tact. No focal deficit   PSYCH: Oriented x3    Significant Labs:   CBC:   Recent Labs  Lab 07/22/18 2106   WBC 0.49*   HGB 7.4*   HCT 21.0*   PLT 25*    and CMP:   Recent Labs  Lab 07/22/18 2106   *   K 4.4      CO2 19*      BUN 10   CREATININE 0.9   CALCIUM 9.1   PROT 6.7   ALBUMIN 3.2*   BILITOT 0.4   ALKPHOS 412*   AST 9*   ALT 11   ANIONGAP 11   EGFRNONAA >60.0       Diagnostic Results:  I have reviewed all pertinent imaging results/findings within the past 24 hours.

## 2018-07-23 NOTE — HPI
68-year-old female with newly diagnosed AML (diagnosed early 2018) and mixed connective tissue disorder presents for fatigue x2 days.  Patient was recently discharged from this facility 7/13 after 50 day admission for AML induction therapy.  States that she felt well when she left the hospital however has been feeling progressively worse over the last couple weeks.  Reports fatigue, general malaise, chills, lightheadedness, nausea and dry heaving.  Reports 1 liquid stool yesterday and once today.  Endorses intermittent shortness of breath. Reports very poor p.o. Intake. Denies cough, chest pain, headache, fevers, abdominal pain or joint pain.

## 2018-07-23 NOTE — NURSING
Patient arrives to the unit at this time via stretcher. Patient ambulated to the bed. 20g noted to right AC and left wrist, normal saline bolus running. Patient has no complaints of pain at this time. Patient oriented to room. Fall precautions instituted. Patient educated on fall precautions and verbalizes understanding. Patient has no skin breakdown noted at this time. Will continue to monitor.

## 2018-07-23 NOTE — PLAN OF CARE
MDR' with Dr Singh.  Patient recently admitted from 5/24-7/13 for induction chemo.  Now readmitted for hypotension, weakness, c/o n/v/d and low PO intake.  Stool + for cdiff.  On contact isolation and PO Vanc.  T max 102.  .  Blood cx in process.  On IV cefepime.  F/u planned for tomorrow has been canceled.   at bedside for support.  Will continue to follow.

## 2018-07-23 NOTE — PLAN OF CARE
Problem: Patient Care Overview  Goal: Plan of Care Review  Outcome: Ongoing (interventions implemented as appropriate)  POC reviewed with patient; understanding verbalized. 1U blood administered this shift. Pt C/O of bone pain; Zyrtec scheduled and Oxy administered X1. She C/O of persistent nausea with two episodes of emesis; she received PRN Ondansetron, Compazine and Xanax. CDiff precautions maintained. Vanc and oral Cefepime administered as ordered. She is an assist one. Voids concentrated urine in the hat; one loose BM this shift. Regular diet with zero appetite. She refuses tele and . Magnesium and Phos replacements administered. Pt. with nonskid footwear on, bed in lowest position, and locked with bed rails up x2.  Pt. instructed to call prior to getting OOB.  Pt. has call light and personal items within reach.  to remain at bedside. VSS and afebrile this shift. All questions and concerns address at this time. Will continue to monitor.

## 2018-07-23 NOTE — ASSESSMENT & PLAN NOTE
- ddx volume depletion vs sepsis  - pt does not look toxic, afebrile but she is neutropenic  - responded to IVF  - sent for sepsis work up (U/A, CXR, blood cultre, urine cx, C.diff)  - LA normal (1.0) but procal slightly elevated (0.36)  - received vanc and Cefepime In ED, will continue for now

## 2018-07-24 PROBLEM — R50.81 NEUTROPENIC FEVER: Status: ACTIVE | Noted: 2018-07-24

## 2018-07-24 PROBLEM — D61.810 PANCYTOPENIA DUE TO CHEMOTHERAPY: Status: ACTIVE | Noted: 2018-06-13

## 2018-07-24 PROBLEM — C93.01: Status: ACTIVE | Noted: 2018-05-24

## 2018-07-24 PROBLEM — D70.9 NEUTROPENIC FEVER: Status: ACTIVE | Noted: 2018-07-24

## 2018-07-24 LAB
ALBUMIN SERPL BCP-MCNC: 2.7 G/DL
ALP SERPL-CCNC: 292 U/L
ALT SERPL W/O P-5'-P-CCNC: 9 U/L
ANION GAP SERPL CALC-SCNC: 8 MMOL/L
ANISOCYTOSIS BLD QL SMEAR: SLIGHT
AST SERPL-CCNC: 10 U/L
BASOPHILS # BLD AUTO: ABNORMAL K/UL
BASOPHILS NFR BLD: 0 %
BILIRUB SERPL-MCNC: 0.6 MG/DL
BUN SERPL-MCNC: 8 MG/DL
CALCIUM SERPL-MCNC: 8.4 MG/DL
CHLORIDE SERPL-SCNC: 108 MMOL/L
CO2 SERPL-SCNC: 19 MMOL/L
CREAT SERPL-MCNC: 0.7 MG/DL
DIFFERENTIAL METHOD: ABNORMAL
E COLI SXT1 STL QL IA: NEGATIVE
E COLI SXT2 STL QL IA: NEGATIVE
EOSINOPHIL # BLD AUTO: ABNORMAL K/UL
EOSINOPHIL NFR BLD: 4 %
ERYTHROCYTE [DISTWIDTH] IN BLOOD BY AUTOMATED COUNT: 13.1 %
EST. GFR  (AFRICAN AMERICAN): >60 ML/MIN/1.73 M^2
EST. GFR  (NON AFRICAN AMERICAN): >60 ML/MIN/1.73 M^2
GLUCOSE SERPL-MCNC: 87 MG/DL
HCT VFR BLD AUTO: 24.8 %
HGB BLD-MCNC: 8.4 G/DL
IMM GRANULOCYTES # BLD AUTO: ABNORMAL K/UL
IMM GRANULOCYTES NFR BLD AUTO: ABNORMAL %
LYMPHOCYTES # BLD AUTO: ABNORMAL K/UL
LYMPHOCYTES NFR BLD: 6 %
MAGNESIUM SERPL-MCNC: 2 MG/DL
MCH RBC QN AUTO: 29.9 PG
MCHC RBC AUTO-ENTMCNC: 33.9 G/DL
MCV RBC AUTO: 88 FL
MONOCYTES # BLD AUTO: ABNORMAL K/UL
MONOCYTES NFR BLD: 38 %
NEUTROPHILS NFR BLD: 52 %
NRBC BLD-RTO: 0 /100 WBC
PHOSPHATE SERPL-MCNC: 2.8 MG/DL
PLATELET # BLD AUTO: 33 K/UL
PLATELET BLD QL SMEAR: ABNORMAL
PMV BLD AUTO: 12.3 FL
POTASSIUM SERPL-SCNC: 3.7 MMOL/L
PROT SERPL-MCNC: 6.1 G/DL
RBC # BLD AUTO: 2.81 M/UL
SODIUM SERPL-SCNC: 135 MMOL/L
WBC # BLD AUTO: 0.77 K/UL

## 2018-07-24 PROCEDURE — 84100 ASSAY OF PHOSPHORUS: CPT

## 2018-07-24 PROCEDURE — 85027 COMPLETE CBC AUTOMATED: CPT

## 2018-07-24 PROCEDURE — 20600001 HC STEP DOWN PRIVATE ROOM

## 2018-07-24 PROCEDURE — 99233 SBSQ HOSP IP/OBS HIGH 50: CPT | Mod: ,,, | Performed by: INTERNAL MEDICINE

## 2018-07-24 PROCEDURE — 63600175 PHARM REV CODE 636 W HCPCS: Performed by: STUDENT IN AN ORGANIZED HEALTH CARE EDUCATION/TRAINING PROGRAM

## 2018-07-24 PROCEDURE — 25000003 PHARM REV CODE 250: Performed by: STUDENT IN AN ORGANIZED HEALTH CARE EDUCATION/TRAINING PROGRAM

## 2018-07-24 PROCEDURE — 85007 BL SMEAR W/DIFF WBC COUNT: CPT

## 2018-07-24 PROCEDURE — 25000003 PHARM REV CODE 250: Performed by: HOSPITALIST

## 2018-07-24 PROCEDURE — 63600175 PHARM REV CODE 636 W HCPCS: Performed by: NURSE PRACTITIONER

## 2018-07-24 PROCEDURE — 25000003 PHARM REV CODE 250: Performed by: NURSE PRACTITIONER

## 2018-07-24 PROCEDURE — 36415 COLL VENOUS BLD VENIPUNCTURE: CPT

## 2018-07-24 PROCEDURE — 83735 ASSAY OF MAGNESIUM: CPT

## 2018-07-24 PROCEDURE — 80053 COMPREHEN METABOLIC PANEL: CPT

## 2018-07-24 RX ORDER — PROCHLORPERAZINE EDISYLATE 5 MG/ML
10 INJECTION INTRAMUSCULAR; INTRAVENOUS EVERY 6 HOURS PRN
Status: DISCONTINUED | OUTPATIENT
Start: 2018-07-24 | End: 2018-07-25 | Stop reason: HOSPADM

## 2018-07-24 RX ORDER — LORAZEPAM 0.5 MG/1
0.5 TABLET ORAL EVERY 6 HOURS PRN
Status: DISCONTINUED | OUTPATIENT
Start: 2018-07-24 | End: 2018-07-25 | Stop reason: HOSPADM

## 2018-07-24 RX ORDER — ONDANSETRON 4 MG/1
8 TABLET, FILM COATED ORAL EVERY 8 HOURS
Status: DISCONTINUED | OUTPATIENT
Start: 2018-07-24 | End: 2018-07-25 | Stop reason: HOSPADM

## 2018-07-24 RX ORDER — CIPROFLOXACIN 250 MG/1
500 TABLET, FILM COATED ORAL EVERY 12 HOURS
Status: DISCONTINUED | OUTPATIENT
Start: 2018-07-24 | End: 2018-07-25 | Stop reason: HOSPADM

## 2018-07-24 RX ORDER — VANCOMYCIN HYDROCHLORIDE 125 MG/1
125 CAPSULE ORAL 4 TIMES DAILY
Qty: 48 CAPSULE | Refills: 0 | Status: SHIPPED | OUTPATIENT
Start: 2018-07-24 | End: 2018-08-06

## 2018-07-24 RX ADMIN — ACYCLOVIR 400 MG: 200 CAPSULE ORAL at 08:07

## 2018-07-24 RX ADMIN — MAGNESIUM OXIDE TAB 400 MG (241.3 MG ELEMENTAL MG) 800 MG: 400 (241.3 MG) TAB at 08:07

## 2018-07-24 RX ADMIN — ACETAMINOPHEN 650 MG: 325 TABLET, FILM COATED ORAL at 08:07

## 2018-07-24 RX ADMIN — LORAZEPAM 0.5 MG: 0.5 TABLET ORAL at 01:07

## 2018-07-24 RX ADMIN — ONDANSETRON 8 MG: 4 TABLET, FILM COATED ORAL at 02:07

## 2018-07-24 RX ADMIN — CIPROFLOXACIN HYDROCHLORIDE 500 MG: 250 TABLET, FILM COATED ORAL at 08:07

## 2018-07-24 RX ADMIN — LORAZEPAM 0.5 MG: 0.5 TABLET ORAL at 08:07

## 2018-07-24 RX ADMIN — CEFEPIME 2 G: 2 INJECTION, POWDER, FOR SOLUTION INTRAVENOUS at 05:07

## 2018-07-24 RX ADMIN — ONDANSETRON 8 MG: 4 TABLET, FILM COATED ORAL at 10:07

## 2018-07-24 RX ADMIN — PROCHLORPERAZINE EDISYLATE 10 MG: 5 INJECTION INTRAMUSCULAR; INTRAVENOUS at 04:07

## 2018-07-24 RX ADMIN — Medication 125 MG: at 05:07

## 2018-07-24 RX ADMIN — ESCITALOPRAM OXALATE 10 MG: 10 TABLET ORAL at 08:07

## 2018-07-24 RX ADMIN — ONDANSETRON 8 MG: 4 TABLET, FILM COATED ORAL at 08:07

## 2018-07-24 RX ADMIN — OXYCODONE HYDROCHLORIDE 5 MG: 5 TABLET ORAL at 05:07

## 2018-07-24 RX ADMIN — PANTOPRAZOLE SODIUM 40 MG: 40 TABLET, DELAYED RELEASE ORAL at 08:07

## 2018-07-24 RX ADMIN — CEFEPIME 2 G: 2 INJECTION, POWDER, FOR SOLUTION INTRAVENOUS at 12:07

## 2018-07-24 RX ADMIN — CETIRIZINE HYDROCHLORIDE 10 MG: 5 TABLET, FILM COATED ORAL at 08:07

## 2018-07-24 RX ADMIN — Medication 125 MG: at 12:07

## 2018-07-24 NOTE — HOSPITAL COURSE
She was admitted for SOB and diarrhea. Initially received vanc and cefepime for neutropenic fever then changed to PO vanc when she was found to be Cdif positive. With treatment, her diarrhea and fever curve improved.  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  She was discharged with PO vanc, as well as prophylaxis with cipro, fluconazole, and acyclovir for ANC of 330.  She has follow up with dermatology in 1-2 weeks go over the biopsy results.

## 2018-07-24 NOTE — ASSESSMENT & PLAN NOTE
- T. Max 101.2 overnight  - BC NGTD, chest x-ray with no acute findings  - Cdiff +  - On Cefepime and Vanc po (day 2)

## 2018-07-24 NOTE — PLAN OF CARE
Problem: Patient Care Overview  Goal: Plan of Care Review  Outcome: Ongoing (interventions implemented as appropriate)  Pt. with nonskid footwear on with bed in lowest position and locked with bed rails up x2.  Pt. instructed to call prior to getting OOB.  Pt. with call light within reach and verbalized understanding.  Pt. with c/o nausea and vomiting today with zofran given scheduled and ativan and compazine given PRN.   Pt. continues with diarrhea.  Pt. with cefepime D/C'd and cipro PO started.  Pt. With a fair appetite.  Will continue to monitor pt.

## 2018-07-24 NOTE — ASSESSMENT & PLAN NOTE
- secondary to chemotherapy.   - tranfuse if Hb < 7 g/dL and Plt < 10,000/mm3 or active bleed.  - , hgb 8.4 gm/dl and platelets [id number] today

## 2018-07-24 NOTE — PLAN OF CARE
Problem: Patient Care Overview  Goal: Plan of Care Review  Outcome: Ongoing (interventions implemented as appropriate)  Patient is progressing and involved with plan of care. Frequent rounds made to assess pain and safety. Pt communicating needs throughout shift. Up with stand by assist. No appetite, voiding without difficulty. No c/o pain. C/O N/V relieved with 1X order IV ativan.Tmax 101.3. All other vitals stable; no acute events this shift. Pt. Remaining free from falls or injury throughout shift; bed in lowest position; side rails up X2; call light within reach; pt instructed to call for assistance as needed - verbalized understanding. Q2h rounding on patient. Will continue to monitor.

## 2018-07-24 NOTE — PROGRESS NOTES
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   is involved was referred to the Social Work Department via routine referral.  Patient presents as a 68 y.o. year old  female.    Persons interviewed: patient     Living Situation:  Pt. States that she resides at home with her spouse (Jesi [phone]). She states that she has been fairly independent with all adl's prior to admission but that her  is able to assist with needs if necessary.      Resides at [address] [place] [address] [place] [address] [place] [address], phone: [phone] (home).      Functional Status Prior  Ambulation: 0-->independent  Transferrin-->independent  Toiletin-->independent  Bathin-->independent  Dressin-->independent  Eatin-->independent  Communication: understands/communicates without difficulty  Swallowing: swallows foods/liquids without difficulty    Current or Past Agencies and Description of Services/Supplies    DME  Agency Name: none  Agency Phone Number: n/a       Home Health  Agency Name: none  Agency Phone Number: n/a  Services: n/a    IV Infusion  Agency Name: none  Agency Phone Number: n/a    Nutrition: oral    Outpatient Pharmacy:     [facility] [address] [address]  Phone: [phone] Fax: [phone]      Patient Preference of agencies include: none noted    Patient/Caregiver informed of right to choose providers or agencies.  Patient provides permission to release any necessary information to [facility] and to  Non-[facility] agencies as needed to facilitate patient care, treatment planning, and patient discharge planning.  Written and verbal resources provided.      Coping: pt. States that she is doing OK. She reports that her  is very supportive and involved in her care.           Adjustment to Diagnosis and Treatment: appropriate      Emotional/Behavioral/Cognitive Issues: none noted            History/Current Symptoms of Anxiety/Depression: No:   History/Current Substance Use:   Social History     Social History Main Topics    Smoking status: Former Smoker     Packs/day: 0.50     Years: 10.00     Types: Cigarettes     Quit date: 3/25/2017    Smokeless tobacco: Not on file    Alcohol use 0.6 oz/week     1 Shots of liquor per week    Drug use: No    Sexual activity: Yes     Partners: Female       Indications of Abuse/Neglect: No:   Abuse Screen  Do You Feel Unsafe at Home, Work or School?: no    Financial:  Payor/Plan Subscr  Sex Relation Sub. Ins. ID Effective Group Num   1. MEDICARE - [place]* ITZEL DOE 1950 Female  [id number]                                    [address]   2. [employer] - MEDIC* ITZEL DOE 1950 Female  [id number]                                    [address] [address]                            Other identified concerns/needs: none noted    Plan: to return home with her spouse who will be the primary caregiver.     Interventions/Referrals: TBD  Patient/caregiver engaged in treatment planning process.     providing psychosocial and supportive counseling, resources, education, assistance and discharge planning as appropriate.  Patient/caregiver state understanding of  available resources,  following, remains available. Provided pt. With pennie'er phone # and encouraged her to call if needed. Will follow.

## 2018-07-24 NOTE — ASSESSMENT & PLAN NOTE
- Day 40 post FLAG JENNIFER re-induction  -  today.  - restaging bone marrow biopsy with GABRIEL, awaiting count recovery BM bx   - prophylactic antimicrobials: Acyclovir

## 2018-07-24 NOTE — SUBJECTIVE & OBJECTIVE
Subjective:     Interval History: Patient admitted from ED with weakness, sob and diarrhea. Day 40 of FLAG JENNIFER. C.diff positive. On Cefepime and Vanc po day 2. Three liquid BM's in the past 24 hours. T.max 101.4 overnight. Has some nausea and dry heaving, On Zofran scheduled and Ativan PRN. No complaints of abdominal pain. Notes poor appetite.     Objective:     Vital Signs (Most Recent):  Temp: 98.1 °F (36.7 °C) (07/24/18 0736)  Pulse: 81 (07/24/18 0736)  Resp: 18 (07/24/18 0736)  BP: 134/70 (07/24/18 0736)  SpO2: 100 % (07/24/18 0736) Vital Signs (24h Range):  Temp:  [98.1 °F (36.7 °C)-101.4 °F (38.6 °C)] 98.1 °F (36.7 °C)  Pulse:  [77-91] 81  Resp:  [17-20] 18  SpO2:  [95 %-100 %] 100 %  BP: (110-134)/(53-70) 134/70     Weight: 64.9 kg (143 lb 1.3 oz)  Body mass index is 26.17 kg/m².  Body surface area is 1.68 meters squared.      Intake/Output - Last 3 Shifts       07/22 0700 - 07/23 0659 07/23 0700 - 07/24 0659 07/24 0700 - 07/25 0659    P.O. 360 200     I.V. (mL/kg)  50 (0.8)     Blood  350     IV Piggyback 250      Total Intake(mL/kg) 610 (9.4) 600 (9.2)     Urine (mL/kg/hr) 200 700 (0.4) 100 (0.4)    Emesis/NG output  100 (0.1)     Total Output 200 800 100    Net +410 -200 -100           Urine Occurrence  6 x     Stool Occurrence  3 x           Physical Exam   Constitutional: She is oriented to person, place, and time. She appears well-developed and well-nourished. No distress.   HENT:   Mouth/Throat: Oropharynx is clear and moist. No oropharyngeal exudate or posterior oropharyngeal erythema.   Eyes: Conjunctivae are normal. Pupils are equal, round, and reactive to light. No scleral icterus.   Neck: Normal range of motion. Neck supple. No thyromegaly present.   Cardiovascular: Normal rate, regular rhythm, normal heart sounds and intact distal pulses.    Pulmonary/Chest: Effort normal and breath sounds normal. No respiratory distress.   Abdominal: Soft. Bowel sounds are normal. She exhibits no distension.  There is no splenomegaly or hepatomegaly. There is no tenderness.   Musculoskeletal: Normal range of motion. She exhibits no edema or tenderness.   Neurological: She is alert and oriented to person, place, and time. No cranial nerve deficit. Coordination normal.   Skin: No rash noted. No cyanosis. No pallor. Nails show no clubbing.   Psychiatric: She has a normal mood and affect. Thought content normal.   Vitals reviewed.      Significant Labs:   CBC:   Recent Labs  Lab 07/22/18 2106 07/23/18  0545 07/24/18  0434   WBC 0.49* 0.49* 0.77*   HGB 7.4* 6.3* 8.4*   HCT 21.0* 18.6* 24.8*   PLT 25* 22*  --     and CMP:   Recent Labs  Lab 07/22/18 2106 07/23/18 0545 07/24/18  0434   * 130* 135*   K 4.4 3.8 3.7    106 108   CO2 19* 14* 19*    102 87   BUN 10 10 8   CREATININE 0.9 0.7 0.7   CALCIUM 9.1 7.6* 8.4*   PROT 6.7 5.8* 6.1   ALBUMIN 3.2* 2.7* 2.7*   BILITOT 0.4 0.4 0.6   ALKPHOS 412* 308* 292*   AST 9* 8* 10   ALT 11 10 9*   ANIONGAP 11 10 8   EGFRNONAA >60.0 >60.0 >60.0       Diagnostic Results:  I have reviewed all pertinent imaging results/findings within the past 24 hours.

## 2018-07-24 NOTE — PROGRESS NOTES
[facility]  Hematology  Bone Marrow Transplant  Progress Note    Patient Name: Noreen Mac  Admission Date: 7/22/2018  Hospital Length of Stay: 1 days  Code Status: Full Code    Subjective:     Interval History: Patient admitted from ED with weakness, sob and diarrhea. Day 40 of FLAG JENNIFER. C.diff positive. On Cefepime and Vanc po day 2. Three liquid BM's in the past 24 hours. T.max 101.4 overnight. Has some nausea and dry heaving, On Zofran scheduled and Ativan PRN. No complaints of abdominal pain. Notes poor appetite.     Objective:     Vital Signs (Most Recent):  Temp: 98.1 °F (36.7 °C) (07/24/18 0736)  Pulse: 81 (07/24/18 0736)  Resp: 18 (07/24/18 0736)  BP: 134/70 (07/24/18 0736)  SpO2: 100 % (07/24/18 0736) Vital Signs (24h Range):  Temp:  [98.1 °F (36.7 °C)-101.4 °F (38.6 °C)] 98.1 °F (36.7 °C)  Pulse:  [77-91] 81  Resp:  [17-20] 18  SpO2:  [95 %-100 %] 100 %  BP: (110-134)/(53-70) 134/70     Weight: 64.9 kg (143 lb 1.3 oz)  Body mass index is 26.17 kg/m².  Body surface area is 1.68 meters squared.      Intake/Output - Last 3 Shifts       07/22 0700 - 07/23 0659 07/23 0700 - 07/24 0659 07/24 0700 - 07/25 0659    P.O. 360 200     I.V. (mL/kg)  50 (0.8)     Blood  350     IV Piggyback 250      Total Intake(mL/kg) 610 (9.4) 600 (9.2)     Urine (mL/kg/hr) 200 700 (0.4) 100 (0.4)    Emesis/NG output  100 (0.1)     Total Output 200 800 100    Net +410 -200 -100           Urine Occurrence  6 x     Stool Occurrence  3 x           Physical Exam   Constitutional: She is oriented to person, place, and time. She appears well-developed and well-nourished. No distress.   HENT:   Mouth/Throat: Oropharynx is clear and moist. No oropharyngeal exudate or posterior oropharyngeal erythema.   Eyes: Conjunctivae are normal. Pupils are equal, round, and reactive to light. No scleral icterus.   Neck: Normal range of motion. Neck supple. No thyromegaly present.   Cardiovascular: Normal rate, regular rhythm,  normal heart sounds and intact distal pulses.    Pulmonary/Chest: Effort normal and breath sounds normal. No respiratory distress.   Abdominal: Soft. Bowel sounds are normal. She exhibits no distension. There is no splenomegaly or hepatomegaly. There is no tenderness.   Musculoskeletal: Normal range of motion. She exhibits no edema or tenderness.   Neurological: She is alert and oriented to person, place, and time. No cranial nerve deficit. Coordination normal.   Skin: No rash noted. No cyanosis. No pallor. Nails show no clubbing.   Psychiatric: She has a normal mood and affect. Thought content normal.   Vitals reviewed.      Significant Labs:   CBC:   Recent Labs  Lab 07/22/18 2106 07/23/18  0545 07/24/18  0434   WBC 0.49* 0.49* 0.77*   HGB 7.4* 6.3* 8.4*   HCT 21.0* 18.6* 24.8*   PLT 25* 22*  --     and CMP:   Recent Labs  Lab 07/22/18 2106 07/23/18  0545 07/24/18  0434   * 130* 135*   K 4.4 3.8 3.7    106 108   CO2 19* 14* 19*    102 87   BUN 10 10 8   CREATININE 0.9 0.7 0.7   CALCIUM 9.1 7.6* 8.4*   PROT 6.7 5.8* 6.1   ALBUMIN 3.2* 2.7* 2.7*   BILITOT 0.4 0.4 0.6   ALKPHOS 412* 308* 292*   AST 9* 8* 10   ALT 11 10 9*   ANIONGAP 11 10 8   EGFRNONAA >60.0 >60.0 >60.0       Diagnostic Results:  I have reviewed all pertinent imaging results/findings within the past 24 hours.    Assessment/Plan:     * Clostridium difficile diarrhea    - C.diff +  - Vanc po (day 2)  - 3 episodes of diarrhea in the past 24 hours          Neutropenic fever    - T. Max 101.2 overnight  - BC NGTD, chest x-ray with no acute findings  - Cdiff +  - On Cefepime and Vanc po (day 2)        Acute monocytic leukemia in remission    - Day 40 post FLAG JENNIFER re-induction  -  today.  - restaging bone marrow biopsy with GABRIEL, awaiting count recovery BM bx   - prophylactic antimicrobials: Acyclovir          Pancytopenia due to chemotherapy    - secondary to chemotherapy.   - tranfuse if Hb < 7 g/dL and Plt < 10,000/mm3 or  active bleed.  - , hgb 8.4 gm/dl and platelets [id number] today          Hypotension    - ddx volume depletion vs sepsis  - pt does not look toxic, afebrile but she is neutropenic  - responded to IVF  - sent for sepsis work up (U/A, CXR, blood cultre, urine cx, C.diff)  - LA normal (1.0) but procal slightly elevated (0.36)  - received vanc and Cefepime In ED, will continue for now          HTN (hypertension)    - on lisinopril and Toprol XL but will hold for now in setting of hypotension  - monitor        Depression    - no acute issue   - continue home Escitalopram              VTE Risk Mitigation         Ordered     Reason for no Mechanical VTE Prophylaxis  Once      07/23/18 0037     IP VTE HIGH RISK PATIENT  Once      07/23/18 0037          Disposition: possible discharge home tomorrow if afebrile     Sweetie Lepe NP  Bone Marrow Transplant  [facility]

## 2018-07-25 VITALS
BODY MASS INDEX: 26.33 KG/M2 | RESPIRATION RATE: 18 BRPM | OXYGEN SATURATION: 100 % | SYSTOLIC BLOOD PRESSURE: 121 MMHG | WEIGHT: 143.06 LBS | DIASTOLIC BLOOD PRESSURE: 57 MMHG | HEART RATE: 82 BPM | HEIGHT: 62 IN | TEMPERATURE: 99 F

## 2018-07-25 PROBLEM — R21 RASH OF HANDS: Status: ACTIVE | Noted: 2018-07-25

## 2018-07-25 LAB
ALBUMIN SERPL BCP-MCNC: 2.6 G/DL
ALP SERPL-CCNC: 273 U/L
ALT SERPL W/O P-5'-P-CCNC: 9 U/L
ANION GAP SERPL CALC-SCNC: 9 MMOL/L
ANISOCYTOSIS BLD QL SMEAR: SLIGHT
AST SERPL-CCNC: 12 U/L
BASOPHILS # BLD AUTO: 0.01 K/UL
BASOPHILS NFR BLD: 1.1 %
BILIRUB SERPL-MCNC: 0.6 MG/DL
BUN SERPL-MCNC: 6 MG/DL
CALCIUM SERPL-MCNC: 8.6 MG/DL
CHLORIDE SERPL-SCNC: 106 MMOL/L
CO2 SERPL-SCNC: 19 MMOL/L
CREAT SERPL-MCNC: 0.7 MG/DL
DIFFERENTIAL METHOD: ABNORMAL
EOSINOPHIL # BLD AUTO: 0 K/UL
EOSINOPHIL NFR BLD: 1.1 %
ERYTHROCYTE [DISTWIDTH] IN BLOOD BY AUTOMATED COUNT: 13.1 %
EST. GFR  (AFRICAN AMERICAN): >60 ML/MIN/1.73 M^2
EST. GFR  (NON AFRICAN AMERICAN): >60 ML/MIN/1.73 M^2
GLUCOSE SERPL-MCNC: 94 MG/DL
HCT VFR BLD AUTO: 22.1 %
HGB BLD-MCNC: 7.8 G/DL
HYPOCHROMIA BLD QL SMEAR: ABNORMAL
IMM GRANULOCYTES # BLD AUTO: 0.02 K/UL
IMM GRANULOCYTES NFR BLD AUTO: 2.1 %
LYMPHOCYTES # BLD AUTO: 0.1 K/UL
LYMPHOCYTES NFR BLD: 5.3 %
MAGNESIUM SERPL-MCNC: 1.7 MG/DL
MCH RBC QN AUTO: 30.2 PG
MCHC RBC AUTO-ENTMCNC: 35.3 G/DL
MCV RBC AUTO: 86 FL
MONOCYTES # BLD AUTO: 0.5 K/UL
MONOCYTES NFR BLD: 55.3 %
NEUTROPHILS # BLD AUTO: 0.3 K/UL
NEUTROPHILS NFR BLD: 35.1 %
NRBC BLD-RTO: 2 /100 WBC
OVALOCYTES BLD QL SMEAR: ABNORMAL
PHOSPHATE SERPL-MCNC: 1.9 MG/DL
PLATELET # BLD AUTO: 42 K/UL
PMV BLD AUTO: 11.9 FL
POIKILOCYTOSIS BLD QL SMEAR: SLIGHT
POLYCHROMASIA BLD QL SMEAR: ABNORMAL
POTASSIUM SERPL-SCNC: 3.2 MMOL/L
PROT SERPL-MCNC: 5.9 G/DL
RBC # BLD AUTO: 2.58 M/UL
SODIUM SERPL-SCNC: 134 MMOL/L
WBC # BLD AUTO: 0.94 K/UL

## 2018-07-25 PROCEDURE — 25000003 PHARM REV CODE 250: Performed by: NURSE PRACTITIONER

## 2018-07-25 PROCEDURE — 25000003 PHARM REV CODE 250: Performed by: HOSPITALIST

## 2018-07-25 PROCEDURE — 88305 TISSUE EXAM BY PATHOLOGIST: CPT | Performed by: PATHOLOGY

## 2018-07-25 PROCEDURE — 36415 COLL VENOUS BLD VENIPUNCTURE: CPT

## 2018-07-25 PROCEDURE — 25000003 PHARM REV CODE 250: Performed by: STUDENT IN AN ORGANIZED HEALTH CARE EDUCATION/TRAINING PROGRAM

## 2018-07-25 PROCEDURE — 85025 COMPLETE CBC W/AUTO DIFF WBC: CPT

## 2018-07-25 PROCEDURE — 83735 ASSAY OF MAGNESIUM: CPT

## 2018-07-25 PROCEDURE — 63600175 PHARM REV CODE 636 W HCPCS: Performed by: NURSE PRACTITIONER

## 2018-07-25 PROCEDURE — 0HBFXZX EXCISION OF RIGHT HAND SKIN, EXTERNAL APPROACH, DIAGNOSTIC: ICD-10-PCS | Performed by: DERMATOLOGY

## 2018-07-25 PROCEDURE — 99239 HOSP IP/OBS DSCHRG MGMT >30: CPT | Mod: ,,, | Performed by: INTERNAL MEDICINE

## 2018-07-25 PROCEDURE — 80053 COMPREHEN METABOLIC PANEL: CPT

## 2018-07-25 PROCEDURE — 84100 ASSAY OF PHOSPHORUS: CPT

## 2018-07-25 RX ORDER — SODIUM,POTASSIUM PHOSPHATES 280-250MG
2 POWDER IN PACKET (EA) ORAL EVERY 4 HOURS
Status: DISCONTINUED | OUTPATIENT
Start: 2018-07-25 | End: 2018-07-25 | Stop reason: HOSPADM

## 2018-07-25 RX ORDER — CIPROFLOXACIN 500 MG/1
500 TABLET ORAL EVERY 12 HOURS
Qty: 28 TABLET | Refills: 0 | Status: SHIPPED | OUTPATIENT
Start: 2018-07-25 | End: 2018-07-31 | Stop reason: SDUPTHER

## 2018-07-25 RX ORDER — FUROSEMIDE 20 MG/1
20 TABLET ORAL DAILY
Status: CANCELLED
Start: 2018-07-25 | End: 2019-07-25

## 2018-07-25 RX ORDER — POTASSIUM CHLORIDE 750 MG/1
40 CAPSULE, EXTENDED RELEASE ORAL ONCE
Status: COMPLETED | OUTPATIENT
Start: 2018-07-25 | End: 2018-07-25

## 2018-07-25 RX ORDER — METOPROLOL SUCCINATE 25 MG/1
25 TABLET, EXTENDED RELEASE ORAL DAILY
Status: CANCELLED
Start: 2018-07-25 | End: 2019-07-25

## 2018-07-25 RX ORDER — LISINOPRIL 5 MG/1
5 TABLET ORAL DAILY
Status: CANCELLED
Start: 2018-07-25 | End: 2019-07-25

## 2018-07-25 RX ORDER — FLUCONAZOLE 200 MG/1
400 TABLET ORAL DAILY
Qty: 28 TABLET | Refills: 0 | Status: SHIPPED | OUTPATIENT
Start: 2018-07-25 | End: 2018-07-31 | Stop reason: SDUPTHER

## 2018-07-25 RX ADMIN — LORAZEPAM 0.5 MG: 0.5 TABLET ORAL at 05:07

## 2018-07-25 RX ADMIN — POTASSIUM CHLORIDE 40 MEQ: 750 CAPSULE, EXTENDED RELEASE ORAL at 09:07

## 2018-07-25 RX ADMIN — LORAZEPAM 0.5 MG: 0.5 TABLET ORAL at 01:07

## 2018-07-25 RX ADMIN — Medication 125 MG: at 11:07

## 2018-07-25 RX ADMIN — Medication 125 MG: at 12:07

## 2018-07-25 RX ADMIN — Medication 125 MG: at 05:07

## 2018-07-25 RX ADMIN — OXYCODONE HYDROCHLORIDE 5 MG: 5 TABLET ORAL at 10:07

## 2018-07-25 RX ADMIN — ONDANSETRON 8 MG: 4 TABLET, FILM COATED ORAL at 01:07

## 2018-07-25 RX ADMIN — ONDANSETRON 8 MG: 4 TABLET, FILM COATED ORAL at 05:07

## 2018-07-25 RX ADMIN — CETIRIZINE HYDROCHLORIDE 10 MG: 5 TABLET, FILM COATED ORAL at 09:07

## 2018-07-25 RX ADMIN — MAGNESIUM OXIDE TAB 400 MG (241.3 MG ELEMENTAL MG) 800 MG: 400 (241.3 MG) TAB at 09:07

## 2018-07-25 RX ADMIN — PANTOPRAZOLE SODIUM 40 MG: 40 TABLET, DELAYED RELEASE ORAL at 09:07

## 2018-07-25 RX ADMIN — PROCHLORPERAZINE EDISYLATE 10 MG: 5 INJECTION INTRAMUSCULAR; INTRAVENOUS at 02:07

## 2018-07-25 RX ADMIN — CIPROFLOXACIN HYDROCHLORIDE 500 MG: 250 TABLET, FILM COATED ORAL at 09:07

## 2018-07-25 RX ADMIN — ESCITALOPRAM OXALATE 10 MG: 10 TABLET ORAL at 09:07

## 2018-07-25 RX ADMIN — SODIUM CHLORIDE 1000 ML: 0.9 INJECTION, SOLUTION INTRAVENOUS at 09:07

## 2018-07-25 RX ADMIN — POTASSIUM & SODIUM PHOSPHATES POWDER PACK 280-160-250 MG 2 PACKET: 280-160-250 PACK at 09:07

## 2018-07-25 RX ADMIN — ACYCLOVIR 400 MG: 200 CAPSULE ORAL at 09:07

## 2018-07-25 RX ADMIN — POTASSIUM & SODIUM PHOSPHATES POWDER PACK 280-160-250 MG 2 PACKET: 280-160-250 PACK at 01:07

## 2018-07-25 NOTE — SUBJECTIVE & OBJECTIVE
Subjective:     Interval History: new pink colored blisters on hands, derm consulted, she otherwise feels ready to go home.  If afebrile by this afternoon, will discharge.     Objective:     Vital Signs (Most Recent):  Temp: 98.3 °F (36.8 °C) (07/25/18 0800)  Pulse: 81 (07/25/18 0800)  Resp: 18 (07/25/18 0800)  BP: (!) 98/50 (07/25/18 0806)  SpO2: 98 % (07/25/18 0800) Vital Signs (24h Range):  Temp:  [98.3 °F (36.8 °C)-101.3 °F (38.5 °C)] 98.3 °F (36.8 °C)  Pulse:  [75-99] 81  Resp:  [17-18] 18  SpO2:  [95 %-100 %] 98 %  BP: ()/(50-67) 98/50     Weight: 64.9 kg (143 lb 1.3 oz)  Body mass index is 26.17 kg/m².  Body surface area is 1.68 meters squared.      Intake/Output - Last 3 Shifts       07/23 0700 - 07/24 0659 07/24 0700 - 07/25 0659 07/25 0700 - 07/26 0659    P.O. 200 240     I.V. (mL/kg) 50 (0.8)      Blood 350      Total Intake(mL/kg) 600 (9.2) 240 (3.7)     Urine (mL/kg/hr) 700 (0.4) 1950 (1.3) 325 (1.3)    Emesis/NG output 100 (0.1)      Total Output 800 1950 325    Net -200 -1710 -325           Urine Occurrence 6 x      Stool Occurrence 3 x 6 x     Emesis Occurrence  1 x           Physical Exam   Constitutional: She is oriented to person, place, and time. She appears well-developed and well-nourished. No distress.   HENT:   Mouth/Throat: Oropharynx is clear and moist. No oropharyngeal exudate or posterior oropharyngeal erythema.   Eyes: Conjunctivae are normal. Pupils are equal, round, and reactive to light. No scleral icterus.   Neck: Normal range of motion. Neck supple. No thyromegaly present.   Cardiovascular: Normal rate, regular rhythm, normal heart sounds and intact distal pulses.    Pulmonary/Chest: Effort normal and breath sounds normal. No respiratory distress.   Abdominal: Soft. Bowel sounds are normal. She exhibits no distension. There is no splenomegaly or hepatomegaly. There is no tenderness.   Musculoskeletal: Normal range of motion. She exhibits no edema or tenderness.    Neurological: She is alert and oriented to person, place, and time. No cranial nerve deficit. Coordination normal.   Skin: Rash noted. No cyanosis. No pallor. Nails show no clubbing.   Pink colored papules at the bases of her palms near the wrists, with smaller lesions on the edges of fingers.   Psychiatric: She has a normal mood and affect. Thought content normal.   Vitals reviewed.      Significant Labs:   CBC:     Recent Labs  Lab 07/24/18  0434 07/25/18  0557   WBC 0.77* 0.94*   HGB 8.4* 7.8*   HCT 24.8* 22.1*   PLT 33* 42*    and CMP:     Recent Labs  Lab 07/24/18  0434 07/25/18  0557   * 134*   K 3.7 3.2*    106   CO2 19* 19*   GLU 87 94   BUN 8 6*   CREATININE 0.7 0.7   CALCIUM 8.4* 8.6*   PROT 6.1 5.9*   ALBUMIN 2.7* 2.6*   BILITOT 0.6 0.6   ALKPHOS 292* 273*   AST 10 12   ALT 9* 9*   ANIONGAP 8 9   EGFRNONAA >60.0 >60.0       Diagnostic Results:  I have reviewed all pertinent imaging results/findings within the past 24 hours.

## 2018-07-25 NOTE — ASSESSMENT & PLAN NOTE
- T. Max 101.3 overnight  - BC NGTD, chest x-ray with no acute findings  - Cdiff +  - On Vanc po.  Discontinued cefepime on 7/25.

## 2018-07-25 NOTE — SUBJECTIVE & OBJECTIVE
Past Medical History:   Diagnosis Date    Hypertension        Past Surgical History:   Procedure Laterality Date    CHOLECYSTECTOMY      HYSTERECTOMY      TONSILLECTOMY       Family History     Problem Relation (Age of Onset)    Cancer Mother, Father        Social History Main Topics    Smoking status: Former Smoker     Packs/day: 0.50     Years: 10.00     Types: Cigarettes     Quit date: 3/25/2017    Smokeless tobacco: Not on file    Alcohol use 0.6 oz/week     1 Shots of liquor per week    Drug use: No    Sexual activity: Yes     Partners: Female       Review of patient's allergies indicates:   Allergen Reactions    Methotrexate analogues      Elevated Liver Enzyme    Bactrim [sulfamethoxazole-trimethoprim] Nausea And Vomiting and Rash       Medications:  Continuous Infusions:  Scheduled Meds:   acyclovir  400 mg Oral BID    cetirizine  10 mg Oral Daily    ciprofloxacin HCl  500 mg Oral Q12H    escitalopram oxalate  10 mg Oral Daily    magnesium oxide  800 mg Oral BID    ondansetron  8 mg Oral Q8H    pantoprazole  40 mg Oral Daily    potassium, sodium phosphates  2 packet Oral Q4H    vancomycin  125 mg Oral Q6H     PRN Meds:acetaminophen, dextrose 50%, dextrose 50%, glucagon (human recombinant), glucose, glucose, insulin aspart U-100, LORazepam, oxyCODONE, prochlorperazine, sodium chloride 0.9%    Review of Systems   Constitutional: Negative for fever (Last fever yesterday evening) and chills.   HENT: Negative for sore throat and mouth sores.    Gastrointestinal: Positive for diarrhea. Negative for nausea and vomiting.   Skin: Positive for rash.   Hematologic/Lymphatic: Bruises/bleeds easily.     Objective:     Vital Signs (Most Recent):  Temp: 99.1 °F (37.3 °C) (07/25/18 1133)  Pulse: 77 (07/25/18 1133)  Resp: 20 (07/25/18 1133)  BP: 124/60 (07/25/18 1133)  SpO2: 99 % (07/25/18 1133) Vital Signs (24h Range):  Temp:  [98.3 °F (36.8 °C)-101.3 °F (38.5 °C)] 99.1 °F (37.3 °C)  Pulse:  [75-99]  77  Resp:  [17-20] 20  SpO2:  [95 %-100 %] 99 %  BP: ()/(50-60) 124/60     Weight: 64.9 kg (143 lb 1.3 oz)  Body mass index is 26.17 kg/m².    Physical Exam   Constitutional: She appears well-developed and well-nourished. No distress.   Neurological: She is alert and oriented to person, place, and time.   Psychiatric: She has a normal mood and affect.   Skin:   Areas Examined (abnormalities noted in diagram):   Head / Face Inspection Performed  Back Inspection Performed  RUE Inspected  LUE Inspection Performed  RLE Inspected  LLE Inspection Performed                        Significant Labs:   Blood Culture: No results for input(s): LABBLOO in the last 48 hours.  CBC:   Recent Labs  Lab 07/24/18  0434 07/25/18  0557   WBC 0.77* 0.94*   HGB 8.4* 7.8*   HCT 24.8* 22.1*   PLT 33* 42*     CMP:   Recent Labs  Lab 07/24/18  0434 07/25/18  0557   * 134*   K 3.7 3.2*    106   CO2 19* 19*   GLU 87 94   BUN 8 6*   CREATININE 0.7 0.7   CALCIUM 8.4* 8.6*   PROT 6.1 5.9*   ALBUMIN 2.7* 2.6*   BILITOT 0.6 0.6   ALKPHOS 292* 273*   AST 10 12   ALT 9* 9*   ANIONGAP 8 9   EGFRNONAA >60.0 >60.0

## 2018-07-25 NOTE — PLAN OF CARE
Problem: Patient Care Overview  Goal: Plan of Care Review  Outcome: Ongoing (interventions implemented as appropriate)  Patient is progressing and involved with plan of care. Frequent rounds made to assess pain and safety. Pt communicating needs throughout shift. Up with stand by assist. No appetite, voiding without difficulty. No c/o pain. C/O N/V relieved with PRN PO ativan.Tmax 101.3, relieved with tylenol. Pt still having liquid BM's. All other vitals stable; no acute events this shift. Pt. Remaining free from falls or injury throughout shift; bed in lowest position; side rails up X2; call light within reach; pt instructed to call for assistance as needed - verbalized understanding. Q2h rounding on patient. Will continue to monitor.

## 2018-07-25 NOTE — ASSESSMENT & PLAN NOTE
- secondary to chemotherapy.   - tranfuse if Hb < 7 g/dL and Plt < 10,000/mm3 or active bleed.

## 2018-07-25 NOTE — ASSESSMENT & PLAN NOTE
- Day 41 post FLAG JENNIFER re-induction  - restaging bone marrow biopsy with GABRIEL, awaiting count recovery BM bx   - prophylactic antimicrobials: Acyclovir

## 2018-07-25 NOTE — CONSULTS
[facility]  Dermatology  Consult Note    Patient Name: Noreen Mac  MRN: [id number]  Admission Date: 7/22/2018  Hospital Length of Stay: 2 days  Attending Physician: Josette Singh MD  Primary Care Provider: Primary Doctor No     Inpatient consult to Dermatology  Consult performed by: ABIEL GRANADOS  Consult ordered by: JYOTHI BARTON        Subjective:     Principal Problem:Clostridium difficile diarrhea    HPI:  69 yo F with h/o recently diagnosed AML with recent prolonged admission for induction chemotherapy, discharged 7/13, with pancytopenia who presented with fatigue, nausea, diarrhea, and fevers. Found to be C.dif positive, currently being treated with po vanc. Dermatology consulted for new rash on hands.     Pt reports she noticed the rash beginning on her fingers around mid-afternoon yesterday (7/24). It subsequently spread to involve the palms near the wrists later that evening. States the lesions are tender to touch, non-pruritic, and have been fluctuating in size. None of the lesions have drained. She has never had a similar rash in the past. Did not have any prodromal numbness/tingling/shooting pains prior to rash onset. Denies any mucosal involvement, no painful oral ulcers, no sore throat. Last fever yesterday evening. Pt still having diarrhea, but overall feeling better than when she was admitted. Possibly getting discharged today.     Past Medical History:   Diagnosis Date    Hypertension        Past Surgical History:   Procedure Laterality Date    CHOLECYSTECTOMY      HYSTERECTOMY      TONSILLECTOMY       Family History     Problem Relation (Age of Onset)    Cancer Mother, Father        Social History Main Topics    Smoking status: Former Smoker     Packs/day: 0.50     Years: 10.00     Types: Cigarettes     Quit date: 3/25/2017    Smokeless tobacco: Not on file    Alcohol use 0.6 oz/week     1 Shots of liquor per week    Drug use: No    Sexual activity:  Yes     Partners: Female       Review of patient's allergies indicates:   Allergen Reactions    Methotrexate analogues      Elevated Liver Enzyme    Bactrim [sulfamethoxazole-trimethoprim] Nausea And Vomiting and Rash       Medications:  Continuous Infusions:  Scheduled Meds:   acyclovir  400 mg Oral BID    cetirizine  10 mg Oral Daily    ciprofloxacin HCl  500 mg Oral Q12H    escitalopram oxalate  10 mg Oral Daily    magnesium oxide  800 mg Oral BID    ondansetron  8 mg Oral Q8H    pantoprazole  40 mg Oral Daily    potassium, sodium phosphates  2 packet Oral Q4H    vancomycin  125 mg Oral Q6H     PRN Meds:acetaminophen, dextrose 50%, dextrose 50%, glucagon (human recombinant), glucose, glucose, insulin aspart U-100, LORazepam, oxyCODONE, prochlorperazine, sodium chloride 0.9%    Review of Systems   Constitutional: Negative for fever (Last fever yesterday evening) and chills.   HENT: Negative for sore throat and mouth sores.    Gastrointestinal: Positive for diarrhea. Negative for nausea and vomiting.   Skin: Positive for rash.   Hematologic/Lymphatic: Bruises/bleeds easily.     Objective:     Vital Signs (Most Recent):  Temp: 99.1 °F (37.3 °C) (07/25/18 1133)  Pulse: 77 (07/25/18 1133)  Resp: 20 (07/25/18 1133)  BP: 124/60 (07/25/18 1133)  SpO2: 99 % (07/25/18 1133) Vital Signs (24h Range):  Temp:  [98.3 °F (36.8 °C)-101.3 °F (38.5 °C)] 99.1 °F (37.3 °C)  Pulse:  [75-99] 77  Resp:  [17-20] 20  SpO2:  [95 %-100 %] 99 %  BP: ()/(50-60) 124/60     Weight: 64.9 kg (143 lb 1.3 oz)  Body mass index is 26.17 kg/m².    Physical Exam   Constitutional: She appears well-developed and well-nourished. No distress.   Neurological: She is alert and oriented to person, place, and time.   Psychiatric: She has a normal mood and affect.   Skin:   Areas Examined (abnormalities noted in diagram):   Head / Face Inspection Performed  Back Inspection Performed  RUE Inspected  LUE Inspection Performed  RLE Inspected  LLE  Inspection Performed                        Significant Labs:   Blood Culture: No results for input(s): LABBLOO in the last 48 hours.  CBC:   Recent Labs  Lab 07/24/18  0434 07/25/18  0557   WBC 0.77* 0.94*   HGB 8.4* 7.8*   HCT 24.8* 22.1*   PLT 33* 42*     CMP:   Recent Labs  Lab 07/24/18  0434 07/25/18  0557   * 134*   K 3.7 3.2*    106   CO2 19* 19*   GLU 87 94   BUN 8 6*   CREATININE 0.7 0.7   CALCIUM 8.4* 8.6*   PROT 6.1 5.9*   ALBUMIN 2.7* 2.6*   BILITOT 0.6 0.6   ALKPHOS 292* 273*   AST 10 12   ALT 9* 9*   ANIONGAP 8 9   EGFRNONAA >60.0 >60.0         Assessment/Plan:     Rash of hands    69 yo F with AML s/p chemotherapy with most recent bone marrow showing GABRIEL, with pancytopenia and new acute onset rash on digits and palms    - 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, which can be seen in the setting of AML or exogenous medications such as G-CSF/GM-CSF) vs viral infection (such as coxsackie virus, EBV, less likely HSV/VZV given that pt has been taking acyclovir)  - Appearance not clinically consistent with leukemia cutis  - Punch biopsy taken from lesion on right palm after verbal consent obtained, discussed potential risks including infection, bleeding, neuropathy, and scarring   - Biopsy results typically take 1-2 weeks, will have pt scheduled in dermatology clinic for suture removal and biopsy results in ~2 weeks  - No treatment recommendations at this time, topicals unlikely to be beneficial in this scenario    After informed written consent was obtained, using alcoholfor cleansing and 1% Lidocaine with epinephrine for anesthetic, with sterile technique a 3 mm punch biopsy was used to obtain a biopsy specimen of the lesion. Hemostasis was obtained by pressure and wound was sutured using 4-0 Prolene. Vaseline and pressure dressing applied, and wound care instructions provided. Be alert for any signs of cutaneous infection. The specimen is  labeled and sent to pathology for evaluation. The procedure was well tolerated without complications.              Thank you for your consult. I will sign off. Please contact us if you have any additional questions.    Nathalia Alejo MD  Dermatology  [facility]

## 2018-07-25 NOTE — HPI
67 yo F with h/o recently diagnosed AML with recent prolonged admission for induction chemotherapy, discharged 7/13, with pancytopenia who presented with fatigue, nausea, diarrhea, and fevers. Found to be C.dif positive, currently being treated with po vanc. Dermatology consulted for new rash on hands.     Pt reports she noticed the rash beginning on her fingers around mid-afternoon yesterday (7/24). It subsequently spread to involve the palms near the wrists later that evening. States the lesions are tender to touch, non-pruritic, and have been fluctuating in size. None of the lesions have drained. She has never had a similar rash in the past. Did not have any prodromal numbness/tingling/shooting pains prior to rash onset. Denies any mucosal involvement, no painful oral ulcers, no sore throat. Last fever yesterday evening. Pt still having diarrhea, but overall feeling better than when she was admitted. Possibly getting discharged today.

## 2018-07-25 NOTE — PROGRESS NOTES
[facility]  Hematology  Bone Marrow Transplant  Progress Note    Patient Name: Noreen Mac  Admission Date: 7/22/2018  Hospital Length of Stay: 2 days  Code Status: Full Code    Subjective:     Interval History: new pink colored blisters on hands, derm consulted, she otherwise feels ready to go home.  If afebrile by this afternoon, will discharge.     Objective:     Vital Signs (Most Recent):  Temp: 98.3 °F (36.8 °C) (07/25/18 0800)  Pulse: 81 (07/25/18 0800)  Resp: 18 (07/25/18 0800)  BP: (!) 98/50 (07/25/18 0806)  SpO2: 98 % (07/25/18 0800) Vital Signs (24h Range):  Temp:  [98.3 °F (36.8 °C)-101.3 °F (38.5 °C)] 98.3 °F (36.8 °C)  Pulse:  [75-99] 81  Resp:  [17-18] 18  SpO2:  [95 %-100 %] 98 %  BP: ()/(50-67) 98/50     Weight: 64.9 kg (143 lb 1.3 oz)  Body mass index is 26.17 kg/m².  Body surface area is 1.68 meters squared.      Intake/Output - Last 3 Shifts       07/23 0700 - 07/24 0659 07/24 0700 - 07/25 0659 07/25 0700 - 07/26 0659    P.O. 200 240     I.V. (mL/kg) 50 (0.8)      Blood 350      Total Intake(mL/kg) 600 (9.2) 240 (3.7)     Urine (mL/kg/hr) 700 (0.4) 1950 (1.3) 325 (1.3)    Emesis/NG output 100 (0.1)      Total Output 800 1950 325    Net -200 -1710 -325           Urine Occurrence 6 x      Stool Occurrence 3 x 6 x     Emesis Occurrence  1 x           Physical Exam   Constitutional: She is oriented to person, place, and time. She appears well-developed and well-nourished. No distress.   HENT:   Mouth/Throat: Oropharynx is clear and moist. No oropharyngeal exudate or posterior oropharyngeal erythema.   Eyes: Conjunctivae are normal. Pupils are equal, round, and reactive to light. No scleral icterus.   Neck: Normal range of motion. Neck supple. No thyromegaly present.   Cardiovascular: Normal rate, regular rhythm, normal heart sounds and intact distal pulses.    Pulmonary/Chest: Effort normal and breath sounds normal. No respiratory distress.   Abdominal: Soft. Bowel  sounds are normal. She exhibits no distension. There is no splenomegaly or hepatomegaly. There is no tenderness.   Musculoskeletal: Normal range of motion. She exhibits no edema or tenderness.   Neurological: She is alert and oriented to person, place, and time. No cranial nerve deficit. Coordination normal.   Skin: Rash noted. No cyanosis. No pallor. Nails show no clubbing.   Pink colored papules at the bases of her palms near the wrists, with smaller lesions on the edges of fingers.   Psychiatric: She has a normal mood and affect. Thought content normal.   Vitals reviewed.      Significant Labs:   CBC:     Recent Labs  Lab 07/24/18  0434 07/25/18  0557   WBC 0.77* 0.94*   HGB 8.4* 7.8*   HCT 24.8* 22.1*   PLT 33* 42*    and CMP:     Recent Labs  Lab 07/24/18 0434 07/25/18  0557   * 134*   K 3.7 3.2*    106   CO2 19* 19*   GLU 87 94   BUN 8 6*   CREATININE 0.7 0.7   CALCIUM 8.4* 8.6*   PROT 6.1 5.9*   ALBUMIN 2.7* 2.6*   BILITOT 0.6 0.6   ALKPHOS 292* 273*   AST 10 12   ALT 9* 9*   ANIONGAP 8 9   EGFRNONAA >60.0 >60.0       Diagnostic Results:  I have reviewed all pertinent imaging results/findings within the past 24 hours.    Assessment/Plan:     * Clostridium difficile diarrhea    - C.diff +  - Vanc po             Neutropenic fever    - T. Max 101.3 overnight  - BC NGTD, chest x-ray with no acute findings  - Cdiff +  - On Vanc po.  cefepime discontinued on 7/24        Hypotension    - ddx volume depletion vs sepsis  - pt does not look toxic, afebrile but she is neutropenic          Pancytopenia due to chemotherapy    - secondary to chemotherapy.   - tranfuse if Hb < 7 g/dL and Plt < 10,000/mm3 or active bleed.            HTN (hypertension)    - on lisinopril and Toprol XL but will hold for now in setting of hypotension  - monitor        Depression    - no acute issue   - continue home Escitalopram          Acute monocytic leukemia in remission    - Day 41 post FLAG JENNIFER re-induction  - restaging bone  marrow biopsy with GABRIEL, awaiting count recovery BM bx   - prophylactic antimicrobials: Acyclovir  - new pink papules appeared on her hands, concerning for leukemic cutis, derm consulted for possible biopsy.            VTE Risk Mitigation         Ordered     Reason for no Mechanical VTE Prophylaxis  Once      07/23/18 0037     IP VTE HIGH RISK PATIENT  Once      07/23/18 0037              Genoveva Suero MD  Bone Marrow Transplant  [facility]

## 2018-07-25 NOTE — PLAN OF CARE
MDR's with Dr Singh.  Patient stool OP improving on PO Vanc.  Febrile overnight but states she is feeling much better today.  Blood cx with NGTD.  BP improved with IVF.  Patient would like to d/c home.  Labs and BMb planned for next week.  CM sent a message to Dr Agarwal/nurse requesting a sooner OP cards appt.  No HH/DME needs.      Future Appointments  Date Time Provider Department Center   7/31/2018 7:10 AM LAB, HEMONC CANCER BLDG [facility] LAB HO [facility]   7/31/2018 8:00 AM Aminata Mckenzie NP [facility] BM LING [facility]   7/31/2018 11:00 AM Laquita Troy MD [facility] HEM ONC [facility]   10/15/2018 11:00 AM Radha Agarwal MD [facility] CARDIO [facility]        07/25/18 1430   Final Note   Assessment Type Final Discharge Note   Discharge Disposition Home   What phone number can be called within the next 1-3 days to see how you are doing after discharge? ([phone])   Hospital Follow Up  Appt(s) scheduled? Yes   Discharge plans and expectations educations in teach back method with documentation complete? Yes

## 2018-07-25 NOTE — PROGRESS NOTES
Discharge instructions given and explained to pt.  Pt. verbalized understanding with no further questions.  Peripheral IV D/C'd with catheter tip intact.  VS WDL.     ROAD TEST  O=SpO2 100% on RA  A=Ambulating around room   D=IV D/C'd  T=Tolerating regular diet  E=Voids  S=Performs self care independently

## 2018-07-25 NOTE — ASSESSMENT & PLAN NOTE
69 yo F with AML s/p chemotherapy with most recent bone marrow showing GABRIEL, with pancytopenia and new acute onset rash on digits and palms    - 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, which can be seen in the setting of AML or exogenous medications such as G-CSF/GM-CSF) vs viral infection (such as coxsackie virus, EBV, less likely HSV/VZV given that pt has been taking acyclovir)  - Appearance not clinically consistent with leukemia cutis  - Punch biopsy taken from lesion on right palm after verbal consent obtained, discussed potential risks including infection, bleeding, neuropathy, and scarring   - Biopsy results typically take 1-2 weeks, will have pt scheduled in dermatology clinic for suture removal and biopsy results in ~2 weeks  - No treatment recommendations at this time, topicals unlikely to be beneficial in this scenario    After informed written consent was obtained, using alcoholfor cleansing and 1% Lidocaine with epinephrine for anesthetic, with sterile technique a 3 mm punch biopsy was used to obtain a biopsy specimen of the lesion. Hemostasis was obtained by pressure and wound was sutured using 4-0 Prolene. Vaseline and pressure dressing applied, and wound care instructions provided. Be alert for any signs of cutaneous infection. The specimen is labeled and sent to pathology for evaluation. The procedure was well tolerated without complications.

## 2018-07-25 NOTE — DISCHARGE SUMMARY
[facility]  Hematology  Bone Marrow Transplant  Discharge Summary      Patient Name: Noreen Mac  MRN: [id number]  Admission Date: 7/22/2018  Hospital Length of Stay: 2 days  Discharge Date and Time: 07/25/2018   Attending Physician: Josette Singh MD   Discharging Provider: Genoveva Suero MD  Primary Care Provider: Primary Doctor No    HPI: 68-year-old female with newly diagnosed AML (diagnosed early 2018) and mixed connective tissue disorder presents for fatigue x2 days.  Patient was recently discharged from this facility 7/13 after 50 day admission for AML induction therapy.  States that she felt well when she left the hospital however has been feeling progressively worse over the last couple weeks.  Reports fatigue, general malaise, chills, lightheadedness, nausea and dry heaving.  Reports 1 liquid stool yesterday and once today.  Endorses intermittent shortness of breath. Reports very poor p.o. Intake. Denies cough, chest pain, headache, fevers, abdominal pain or joint pain.    * No surgery found *     Hospital Course: She was admitted for SOB and diarrhea. Initially received vanc and cefepime for neutropenic fever then changed to PO vanc when she was found to be Cdif positive. With treatment, her diarrhea and fever curve improved.  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  She was discharged with PO vanc, as well as prophylaxis with cipro, fluconazole, and acyclovir for ANC of 330.  She has follow up with dermatology in 1-2 weeks go over the biopsy results.    Consults         Status Ordering Provider     Inpatient consult to Dermatology  Once     Provider:  (Not yet assigned)    Completed GENOVEVA SUERO     Inpatient consult to Hematology/Oncology  Once     Provider:  (Not yet assigned)    Completed JAMEL RATLIFF          Significant Diagnostic Studies: Labs:   CMP   Recent Labs  Lab  07/24/18  0434 07/25/18  0557   * 134*   K 3.7 3.2*    106   CO2 19* 19*   GLU 87 94   BUN 8 6*   CREATININE 0.7 0.7   CALCIUM 8.4* 8.6*   PROT 6.1 5.9*   ALBUMIN 2.7* 2.6*   BILITOT 0.6 0.6   ALKPHOS 292* 273*   AST 10 12   ALT 9* 9*   ANIONGAP 8 9   ESTGFRAFRICA >60.0 >60.0   EGFRNONAA >60.0 >60.0    and CBC   Recent Labs  Lab 07/24/18  0434 07/25/18  0557   WBC 0.77* 0.94*   HGB 8.4* 7.8*   HCT 24.8* 22.1*   PLT 33* 42*       Pending Diagnostic Studies:     None        Final Active Diagnoses:    Diagnosis Date Noted POA    PRINCIPAL PROBLEM:  Clostridium difficile diarrhea [A04.72] 07/23/2018 Yes    Neutropenic fever [D70.9, R50.81] 07/24/2018 Yes    Rash of hands [R21] 07/25/2018 Unknown    Hypotension [I95.9] 07/23/2018 Yes    Pancytopenia due to chemotherapy [D61.810] 06/13/2018 Yes    Depression [F32.9] 05/25/2018 Yes    HTN (hypertension) [I10] 05/25/2018 Yes    Acute monocytic leukemia in remission [C93.01] 05/24/2018 Yes      Problems Resolved During this Admission:    Diagnosis Date Noted Date Resolved POA      Discharged Condition: fair    Disposition: Home or Self Care    Follow Up:  Follow-up Information     [facility] On 7/31/2018.    Specialty:  Lab  Why:  Labs- 7:10am  Contact information:  [place] [facility] [address] [phone]  Additional information:  [facility] 3rd Floor           Aminata Mckenzie NP On 7/31/2018.    Specialty:  Hematology and Oncology  Why:  bone marrow biopsy- 8:00am  Contact information:  [place] [facility] [id number] [phone]                 Patient Instructions:     Activity as tolerated       Medications:  Reconciled Home Medications:      Medication List      START taking these medications    ciprofloxacin HCl 500 MG tablet  Commonly known as:  CIPRO  Take 1 tablet (500 mg total) by mouth every 12 (twelve) hours. for 14 days     fluconazole 200 MG Tab  Commonly known as:   DIFLUCAN  Take 2 tablets (400 mg total) by mouth once daily. for 14 days     vancomycin 125 MG capsule  Commonly known as:  VANCOCIN  Take 1 capsule (125 mg total) by mouth 4 (four) times daily. for 12 days        CONTINUE taking these medications    acyclovir 200 MG capsule  Commonly known as:  ZOVIRAX  Take 2 capsules (400 mg total) by mouth 2 (two) times daily.     ALPRAZolam 0.25 MG tablet  Commonly known as:  XANAX  Take 1 tablet (0.25 mg total) by mouth 3 (three) times daily as needed for Anxiety.     cetirizine 10 MG tablet  Commonly known as:  ZYRTEC  Take 1 tablet (10 mg total) by mouth once daily for 10 days     escitalopram oxalate 10 MG tablet  Commonly known as:  LEXAPRO  Take 10 mg by mouth once daily.     magnesium oxide 400 mg tablet  Commonly known as:  MAG-OX  Take 2 tablets (800 mg total) by mouth 2 (two) times daily.     metoprolol succinate 25 MG 24 hr tablet  Commonly known as:  TOPROL-XL  Take 1 tablet (25 mg total) by mouth once daily.     * morphine 15 MG tablet  Commonly known as:  MSIR  Take 1 tablet (15 mg total) by mouth every 4 (four) hours as needed.     * morphine 15 MG 12 hr tablet  Commonly known as:  MS CONTIN  Take 2 tablets (30 mg total) by mouth 2 (two) times daily.     omeprazole 20 MG tablet  Commonly known as:  PRILOSEC OTC  Take 20 mg by mouth every morning.     ondansetron 8 MG tablet  Commonly known as:  ZOFRAN  Take 1 tablet (8 mg total) by mouth every 12 (twelve) hours as needed for Nausea.     vitamin D 1000 units Tab  Take 1 tablet (1,000 Units total) by mouth once daily.        * This list has 2 medication(s) that are the same as other medications prescribed for you. Read the directions carefully, and ask your doctor or other care provider to review them with you.            STOP taking these medications    furosemide 20 MG tablet  Commonly known as:  LASIX     lisinopril 5 MG tablet  Commonly known as:  TRUDY LANDEROS MD  Bone Marrow  Transplant  [facility]

## 2018-07-26 LAB — BACTERIA STL CULT: NORMAL

## 2018-07-26 PROCEDURE — 88305 TISSUE EXAM BY PATHOLOGIST: CPT | Performed by: PATHOLOGY

## 2018-07-27 LAB
BACTERIA BLD CULT: NORMAL
BACTERIA BLD CULT: NORMAL
BLD PROD TYP BPU: NORMAL
BLOOD UNIT EXPIRATION DATE: NORMAL
BLOOD UNIT TYPE CODE: 6200
BLOOD UNIT TYPE: NORMAL
CODING SYSTEM: NORMAL
DISPENSE STATUS: NORMAL
NUM UNITS TRANS PACKED RBC: NORMAL

## 2018-07-31 ENCOUNTER — OFFICE VISIT (OUTPATIENT)
Dept: HEMATOLOGY/ONCOLOGY | Facility: CLINIC | Age: 68
End: 2018-07-31
Payer: MEDICARE

## 2018-07-31 ENCOUNTER — LAB VISIT (OUTPATIENT)
Dept: LAB | Facility: HOSPITAL | Age: 68
End: 2018-07-31
Payer: MEDICARE

## 2018-07-31 VITALS
DIASTOLIC BLOOD PRESSURE: 50 MMHG | SYSTOLIC BLOOD PRESSURE: 98 MMHG | OXYGEN SATURATION: 99 % | BODY MASS INDEX: 25.28 KG/M2 | HEART RATE: 85 BPM | RESPIRATION RATE: 16 BRPM | TEMPERATURE: 98 F | WEIGHT: 137.38 LBS | HEIGHT: 62 IN

## 2018-07-31 VITALS
OXYGEN SATURATION: 98 % | SYSTOLIC BLOOD PRESSURE: 99 MMHG | RESPIRATION RATE: 20 BRPM | HEART RATE: 92 BPM | TEMPERATURE: 97 F | DIASTOLIC BLOOD PRESSURE: 55 MMHG

## 2018-07-31 DIAGNOSIS — T45.1X5A CINV (CHEMOTHERAPY-INDUCED NAUSEA AND VOMITING): ICD-10-CM

## 2018-07-31 DIAGNOSIS — C93.01 ACUTE MONOCYTIC LEUKEMIA IN REMISSION: ICD-10-CM

## 2018-07-31 DIAGNOSIS — C93.01 ACUTE MONOCYTIC LEUKEMIA IN REMISSION: Primary | ICD-10-CM

## 2018-07-31 DIAGNOSIS — T45.1X5D ADVERSE EFFECT OF CHEMOTHERAPY, SUBSEQUENT ENCOUNTER: ICD-10-CM

## 2018-07-31 DIAGNOSIS — R11.2 CINV (CHEMOTHERAPY-INDUCED NAUSEA AND VOMITING): ICD-10-CM

## 2018-07-31 DIAGNOSIS — R21 RASH OF HANDS: ICD-10-CM

## 2018-07-31 DIAGNOSIS — C93.00: ICD-10-CM

## 2018-07-31 DIAGNOSIS — C92.00 ACUTE MYELOID LEUKEMIA NOT HAVING ACHIEVED REMISSION: Primary | ICD-10-CM

## 2018-07-31 DIAGNOSIS — A04.72 CLOSTRIDIUM DIFFICILE DIARRHEA: ICD-10-CM

## 2018-07-31 LAB
ABO + RH BLD: NORMAL
ALBUMIN SERPL BCP-MCNC: 2.8 G/DL
ALP SERPL-CCNC: 253 U/L
ALT SERPL W/O P-5'-P-CCNC: 9 U/L
ANION GAP SERPL CALC-SCNC: 11 MMOL/L
ANISOCYTOSIS BLD QL SMEAR: SLIGHT
AST SERPL-CCNC: 16 U/L
BASOPHILS # BLD AUTO: ABNORMAL K/UL
BASOPHILS NFR BLD: 0 %
BILIRUB SERPL-MCNC: 0.5 MG/DL
BLD GP AB SCN CELLS X3 SERPL QL: NORMAL
BONE MARROW IRON STAIN COMMENT: NORMAL
BONE MARROW WRIGHT STAIN COMMENT: NORMAL
BUN SERPL-MCNC: 6 MG/DL
CALCIUM SERPL-MCNC: 9.6 MG/DL
CHLORIDE SERPL-SCNC: 102 MMOL/L
CO2 SERPL-SCNC: 26 MMOL/L
CREAT SERPL-MCNC: 0.7 MG/DL
DIFFERENTIAL METHOD: ABNORMAL
EOSINOPHIL # BLD AUTO: ABNORMAL K/UL
EOSINOPHIL NFR BLD: 0 %
ERYTHROCYTE [DISTWIDTH] IN BLOOD BY AUTOMATED COUNT: 14.6 %
EST. GFR  (AFRICAN AMERICAN): >60 ML/MIN/1.73 M^2
EST. GFR  (NON AFRICAN AMERICAN): >60 ML/MIN/1.73 M^2
GLUCOSE SERPL-MCNC: 104 MG/DL
HCT VFR BLD AUTO: 25.5 %
HGB BLD-MCNC: 8.5 G/DL
IMM GRANULOCYTES # BLD AUTO: ABNORMAL K/UL
IMM GRANULOCYTES NFR BLD AUTO: ABNORMAL %
LYMPHOCYTES # BLD AUTO: ABNORMAL K/UL
LYMPHOCYTES NFR BLD: 2 %
MAGNESIUM SERPL-MCNC: 1.3 MG/DL
MCH RBC QN AUTO: 29 PG
MCHC RBC AUTO-ENTMCNC: 33.3 G/DL
MCV RBC AUTO: 87 FL
MONOCYTES # BLD AUTO: ABNORMAL K/UL
MONOCYTES NFR BLD: 12 %
MYELOCYTES NFR BLD MANUAL: 2 %
NEUTROPHILS # BLD AUTO: ABNORMAL K/UL
NEUTROPHILS NFR BLD: 84 %
NRBC BLD-RTO: 1 /100 WBC
OVALOCYTES BLD QL SMEAR: ABNORMAL
PHOSPHATE SERPL-MCNC: 2.3 MG/DL
PLATELET # BLD AUTO: 169 K/UL
PLATELET BLD QL SMEAR: ABNORMAL
PMV BLD AUTO: 10.4 FL
POIKILOCYTOSIS BLD QL SMEAR: SLIGHT
POLYCHROMASIA BLD QL SMEAR: ABNORMAL
POTASSIUM SERPL-SCNC: 3.3 MMOL/L
PROT SERPL-MCNC: 6.5 G/DL
RBC # BLD AUTO: 2.93 M/UL
SODIUM SERPL-SCNC: 139 MMOL/L
WBC # BLD AUTO: 8.34 K/UL

## 2018-07-31 PROCEDURE — 38222 DX BONE MARROW BX & ASPIR: CPT | Mod: S$PBB,RT,, | Performed by: NURSE PRACTITIONER

## 2018-07-31 PROCEDURE — 99999 PR PBB SHADOW E&M-EST. PATIENT-LVL III: CPT | Mod: PBBFAC,,, | Performed by: NURSE PRACTITIONER

## 2018-07-31 PROCEDURE — 99499 UNLISTED E&M SERVICE: CPT | Mod: S$PBB,,, | Performed by: NURSE PRACTITIONER

## 2018-07-31 PROCEDURE — 99215 OFFICE O/P EST HI 40 MIN: CPT | Mod: S$PBB,,, | Performed by: INTERNAL MEDICINE

## 2018-07-31 PROCEDURE — 36415 COLL VENOUS BLD VENIPUNCTURE: CPT

## 2018-07-31 PROCEDURE — 86901 BLOOD TYPING SEROLOGIC RH(D): CPT

## 2018-07-31 PROCEDURE — 88313 SPECIAL STAINS GROUP 2: CPT | Mod: 26,,, | Performed by: PATHOLOGY

## 2018-07-31 PROCEDURE — 88264 CHROMOSOME ANALYSIS 20-25: CPT

## 2018-07-31 PROCEDURE — 85027 COMPLETE CBC AUTOMATED: CPT

## 2018-07-31 PROCEDURE — 99214 OFFICE O/P EST MOD 30 MIN: CPT | Mod: PBBFAC,25,27 | Performed by: INTERNAL MEDICINE

## 2018-07-31 PROCEDURE — 80053 COMPREHEN METABOLIC PANEL: CPT

## 2018-07-31 PROCEDURE — 88184 FLOWCYTOMETRY/ TC 1 MARKER: CPT | Performed by: PATHOLOGY

## 2018-07-31 PROCEDURE — 88313 SPECIAL STAINS GROUP 2: CPT | Performed by: PATHOLOGY

## 2018-07-31 PROCEDURE — 88313 SPECIAL STAINS GROUP 2: CPT

## 2018-07-31 PROCEDURE — 83735 ASSAY OF MAGNESIUM: CPT

## 2018-07-31 PROCEDURE — 88189 FLOWCYTOMETRY/READ 16 & >: CPT | Mod: ,,, | Performed by: PATHOLOGY

## 2018-07-31 PROCEDURE — 88271 CYTOGENETICS DNA PROBE: CPT | Mod: 59

## 2018-07-31 PROCEDURE — 99999 PR PBB SHADOW E&M-EST. PATIENT-LVL IV: CPT | Mod: PBBFAC,,, | Performed by: INTERNAL MEDICINE

## 2018-07-31 PROCEDURE — 88305 TISSUE EXAM BY PATHOLOGIST: CPT | Mod: 26,,, | Performed by: PATHOLOGY

## 2018-07-31 PROCEDURE — 88311 DECALCIFY TISSUE: CPT | Mod: 26,,, | Performed by: PATHOLOGY

## 2018-07-31 PROCEDURE — 85097 BONE MARROW INTERPRETATION: CPT | Mod: ,,, | Performed by: PATHOLOGY

## 2018-07-31 PROCEDURE — 38222 DX BONE MARROW BX & ASPIR: CPT | Mod: PBBFAC | Performed by: NURSE PRACTITIONER

## 2018-07-31 PROCEDURE — 88275 CYTOGENETICS 100-300: CPT

## 2018-07-31 PROCEDURE — 84100 ASSAY OF PHOSPHORUS: CPT

## 2018-07-31 PROCEDURE — 85007 BL SMEAR W/DIFF WBC COUNT: CPT

## 2018-07-31 PROCEDURE — 88275 CYTOGENETICS 100-300: CPT | Mod: 59

## 2018-07-31 PROCEDURE — 88185 FLOWCYTOMETRY/TC ADD-ON: CPT | Performed by: PATHOLOGY

## 2018-07-31 PROCEDURE — 88305 TISSUE EXAM BY PATHOLOGIST: CPT | Performed by: PATHOLOGY

## 2018-07-31 PROCEDURE — 99213 OFFICE O/P EST LOW 20 MIN: CPT | Mod: PBBFAC,25 | Performed by: NURSE PRACTITIONER

## 2018-07-31 RX ORDER — PROCHLORPERAZINE MALEATE 10 MG
10 TABLET ORAL 4 TIMES DAILY
Qty: 60 TABLET | Refills: 3 | Status: SHIPPED | OUTPATIENT
Start: 2018-07-31 | End: 2019-07-31

## 2018-07-31 RX ORDER — FLUCONAZOLE 200 MG/1
400 TABLET ORAL DAILY
Qty: 60 TABLET | Refills: 3 | Status: SHIPPED | OUTPATIENT
Start: 2018-07-31 | End: 2019-02-18 | Stop reason: SDUPTHER

## 2018-07-31 RX ORDER — ACYCLOVIR 200 MG/1
400 CAPSULE ORAL 2 TIMES DAILY
Qty: 120 CAPSULE | Refills: 11 | Status: SHIPPED | OUTPATIENT
Start: 2018-07-31 | End: 2019-10-25

## 2018-07-31 RX ORDER — CIPROFLOXACIN 500 MG/1
500 TABLET ORAL EVERY 12 HOURS
Qty: 60 TABLET | Refills: 3 | Status: SHIPPED | OUTPATIENT
Start: 2018-07-31 | End: 2018-08-23 | Stop reason: SDUPTHER

## 2018-07-31 NOTE — PROGRESS NOTES
PROCEDURE NOTE:  Bone Marrow Biopsy  Date: 07/31/2018   Indication: AML s/p FLAG-JENNIFER  Consent: Informed consent was obtained from patient.  Timeout: Done and documented.  Site: Right posterior illiac crest.  Position: Prone  Prep: Chlorahexadine.  Needle used: 11 gauge Jamshidi needle.  Anesthetic: 2% lidocaine 10 cc.  Biopsy: The biopsy needle was introduced into the marrow cavity and an aspirate was obtained without complications and sent for flow cytometry, AML FISH, FLT3, and cytogenetics. Core biopsy obtained without difficulty and sent for routine histologic examination.  Complications: None.  Disposition: The patient was discharged home after lying flat on back x20 minutes. RN to assess BMBX site prior to leaving clinic.  Minimal blood loss  F/U with Dr. Troy for results.    Aminata Mckenzie, NURYS, FNP   Hematology/Bone Marrow Transplant

## 2018-07-31 NOTE — PROGRESS NOTES
Hematology and Medical Oncology   Follow Up Note     07/31/2018    Primary Oncologic Diagnosis: AML, FLT 3 + and NPMN1+.   History of Present Ilness:   Noreen Mac (Noreen) is a pleasant 68 y.o.female presents to clinic following 2 induction therapies for AML. She was in the hospital roughly 50 days to receive 7+3 and then FLAG JENNIFER.    Oncology History:   68 y.o. female admitted overnight for possible new AML. Came in from OSH with elevated WBC of 100.   05/25/2018: Pt is now on hydrea 2 grams BID, allopurinol 300 mg daily, and IVF. Pt may need rasburicase this weekend. She will undergo a BM bx and aspiration today. She is an induction candidate and will not be screened for research trials. She has anxiety for which she usually takes xanax, this was re-started.   05/26/2018 PICC placed. Reports she slept well last night. Anxiety improved with prn xanax. EF 65%, HIV and Hep panel negative. 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  6/12/2018: Day 15 of 7+3 induction, restaging BM bx done yesterday. On Midostaurin. Fever yesterday and BC with gram + cocci in clusters. On cefepime day 2 and will start Vanc today. Labial mucositis improving.   6/13/2018: Day 16 7+3. BC with staph aureus, identification pending. Surveillance blood cultures done today. Afebrile x 48 hours. On cefepime and Vanc. Labial mucositis resolved. No complaints of pain. Nausea controlled. No diarrhea. Primary complaint is fatigue.   6/14/20018: Day 17 of 7+3. Day 14 bone marrow results pending. BC with staph epidermis only sensitive to Vancomycin. Vanc day 3 and cefepime day 4. Vanc trough 12.2 last night. BP remains low but stable. Afebrile x 72 hours.   6/15/2018: Day 18 of 7+3. Day 14 bone marrow biopsy shows persistent disease with 15% blasts. Discussion with patient regarding treatment and she is deciding if she wants to proceed with re-induction vs outpatient maintenance. Temp of 100.4 overnight, unsustained. Day 5 Cefepime and Day 4 of  Vanc for staph epidermis. Repeat cultures NGTD. BP improved. Electrolytes (mag, phos, K and calcium) replaced aggressively this am and will repeat labs this afternoon. No complaints this am.   06/16/2018: Day 19 of 7+3. Day 2 of Flag-JENNIFER. Remains afebrile. Calcium remains low and have increased PO supplementation. Remains on vanc and aztreonam. Somewhat loopy feeling after receiving benadryl.   06/17/2018: Day 20 of 7+3. Day 3 of FLAG-JENNIFER. Multiple electrolyte abnormalities. New bilateral leg and feet swelling.   6/18/2018: Day 21 of 7+3 and Day 4 of FLAG JENNIFER. Tolerating well with some nausea controlled with Zofran. VSS, afebrile. ANC 1900 on Neupogen. Continues Vanc for staph epi bacteremia. Multiple electrolytes replaced today. Complains of edema to lower extremities, not improved after 20 of lasix yesterday. No sob or CP.   6/19/18: Day 22 of 7+3 and Day 5 of FLAG JENNIFER. VSS, afebrile, ANC 3900. Vanc trough elevated and dose adjusted this am. Lower extremity edema improved after 40 of lasix yesterday, net negative 300 cc I&O. Mild nausea, no abdominal pain or diarrhea. Poor appetite but no difficulty eating or taking pills.   6/20/18: Day 23 of 7+3 and Day 6 of FLAG JENNIFER. Patient complains of nausea and vomiting overnight and this am, especially when taking pills. Will add Zyprexa daily in addition to Zofran, compazine, and ativan PRN and will change meds to IV. 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 Afebrile.  No diarrhea, mouth sores or pain.  06/21/2018: Day 24 of 7+3 and Day 7 of FLAG JENNIFER. Nausea better controlled. Continued on Vanc.  6/22/2018: Day 25 of 7+3 and Day 8 of FLAG JENNIFER. Nausea improved some with Zyprexa but did have 1 episode of emesis overnight.continuing meds IV due to vomiting. Remains afebrile. ANC 0. Continues Vanc and Flagyl. Electrolytes replaced.   06/23/2018 : day 26 of 7+3 and Day 9 of FLAG JENNIFER.  Nausea persists,  worsened after taking pills so meds converted to IV.  Encouraged ambulation.  Continue with flagyl for leptotrichia goodfellowii.  RUQ US showed intrahepatic dilation, overall impression hepatic steatosis.  CBD 0.5cm.  No Gallbladder.    06/24/2018 : day 27 of 7+3 and Day 10 of FLAG JENNIFER.  Nausea persisting, able to tolerate some po pills.  Last day of flagyl (day 5).  Still pancytopenic. Appetite still low as po intake triggers nausea.  06/25/2018: day 28 of 7+3 and Day 11 of FLAG JENNIFER. Afebrile, ANC 0. Has completed Flagyl. Will decrease Vanc to 1000 mg q12, trough 19.9, will complete Vanc on 6/27. Liver enzymes stable on Midostaurin. VSS.   6/26/2018: day 29 of 7+3 and day 12 of FLAG JENNIFER. Liver enzymes improved and Midostaurin increased to full dose 50 mg bid. Nausea controlled, afebrile, VSS, NEON.  6/27/2018: day 30 of 7+3 and Day 13 of FLAG JENNIFER. Persistent nausea and emesis x 1 yesterday. Compazine changed to scheduled. Vanc ends today. Afebrile, VSS. Aggressive electrolyte replacement, low electrolytes possibly due to Midostaurin.   6/28/2018: day 31 of 7+3 and Day 14 of FLAG JENNIFER. Nausea improved with scheduled Compazine. Patient has agreed to start converting some IV meds to po. VSS, afebrile, electrolytes wnl today. Only complains of fatigue, new rash noted to upper back and chest and legs, papular rash mildly red.  6/29/2018: Day 32 of 7+3 and Day 15 of FLAG JENNIFER. Day 14 restaging bone marrow biopsy done at bedside today. Patient states nausea improved but she did have emesis last night after taking 9 pm pills. Rash improved. No mouth sores, diarrhea or pain.   7/2/2018: Day 35 of 7+3 and Day 18 of FLAG JENNIFER. Restaging BM bx results pending. Fluid overload over the weekend. Chest x-ray with pulmonary edema. Os sats down to 88%. Placed on O2 at 2 L NC, off O2 this am. Received lasix. Net negative 2.7 L today. 1 episode of nausea, no emesis. Reports anxiety relieved with PRN meds. Electrolytes improved,  afebrile, VSS.   7/3/2018: Day 36 of 7+3 and Day 19 of FLAG JENNIFER. Restaging Bm bx with GABRIEL. Cardiology consulted for abnormal echo, EF 30% with pulmonary HTN and severe L atrial enlargement. EKG with T wave abnormality, possible anterolateral ischemia and prolonged QTc of 530. Medications adjusted. Nausea controlled, no diarrhea. Electrolytes improved. On O2 as needed.   07/04/2018 : Day 37 of 7+3 and Day 20 of FLAG JENNIFER Diarrhea in AM, watery, no associated abdominal pain, nausea, or vomiting.    07/05/2018: Day 38 of 7+3 ane Day 21 of FLAG JENNIFER. No CP or SOB, cardiology following. On RA. All meds changed to po, denies nausea or vomiting and no diarrhea. VSS, afebrile.   7/6/2018: Day 22 of FLAG JENNIFER. Continues to tolerate po meds with no nausea. Afebrile. Awaiting count recovery for discharge.  7/7/2018-/8/2018: Day 40/41 of 7+3, Day 23/24 of FLAG JENNFIER.  Improving clinically.  Started on mag oxide per patient request.  Labs showing signs of ANC recovery.  7/9/2018: Day 25 FLAG JENNIFER, , continues Neupogen. Received platelets today. Afebrile, VSS. Tolerating all oral meds with no nausea or vomiting. Only complains of fatigue.   7/10/2018: Day 26 FLAG JENNIFER,  today. Had 2 episodes of vomiting and diarrhea last night. Feeling better. Afebrile, VSS.   7/11/2018: Day 27 FLAG JENNIFER,  today. No vomiting or diarrhea overnight and no nausea. Afebrile, VSS. Complains of back pain (bone pain) suspect due to count recovery. Relieved with oxy IR and Zyrtec started.  07/12/2018: Day 28 flag jennifer, engrafted today with ANC of 730. Back pain improved with zyrtec. Scheduled for IT chemo tomorrow at 1:30 pm and discharge home thereafter. Will likely need plt transfusion prior to IT chemo tomorrow   7/13/2018: Day 29 of FLAG JENNIFER. ANC up to 1300 today. Transfusing platelets today prior to LP for IT chemo. Patient continues to complain of bone pain, mostly to back and legs. No nausea, diarrhea, sob. Afebrile and VSS.    --hospitalized 7/23-7/25 for C.diff diarrhea, neutropenic fever.  While inpatient she developed pink blisters on her palm that were concerning for relapse.    Interval History:   Ms. Mac was hospitalized for neutropenic fever since her last clinic visit. She feels better as the diarrhea has slowed garrett to 1-2 times a day. Began PO vanc on 7/25 and will complete a 2 week course. The rib pain has resolved. Lesions on her palms remains. They do no itch and are not painful.    Her overarching complaint is unrelenting nausea, not well controlled with zofran.    Past Medical History:   Past Medical History:   Diagnosis Date    Hypertension        Current Medications:   Current Outpatient Prescriptions   Medication Sig    acyclovir (ZOVIRAX) 200 MG capsule Take 2 capsules (400 mg total) by mouth 2 (two) times daily.    ALPRAZolam (XANAX) 0.25 MG tablet Take 1 tablet (0.25 mg total) by mouth 3 (three) times daily as needed for Anxiety.    ciprofloxacin HCl (CIPRO) 500 MG tablet Take 1 tablet (500 mg total) by mouth every 12 (twelve) hours. for 14 days    escitalopram oxalate (LEXAPRO) 10 MG tablet Take 10 mg by mouth once daily.    fluconazole (DIFLUCAN) 200 MG Tab Take 2 tablets (400 mg total) by mouth once daily. for 14 days    magnesium oxide (MAG-OX) 400 mg tablet Take 2 tablets (800 mg total) by mouth 2 (two) times daily.    metoprolol succinate (TOPROL-XL) 25 MG 24 hr tablet Take 1 tablet (25 mg total) by mouth once daily.    morphine (MS CONTIN) 15 MG 12 hr tablet Take 2 tablets (30 mg total) by mouth 2 (two) times daily.    morphine (MSIR) 15 MG tablet Take 1 tablet (15 mg total) by mouth every 4 (four) hours as needed.    omeprazole (PRILOSEC OTC) 20 MG tablet Take 20 mg by mouth every morning.    ondansetron (ZOFRAN) 8 MG tablet Take 1 tablet (8 mg total) by mouth every 12 (twelve) hours as needed for Nausea.    vancomycin (VANCOCIN) 125 MG capsule Take 1 capsule (125 mg total) by mouth 4 (four)  times daily. for 12 days    vitamin D 1000 units Tab Take 1 tablet (1,000 Units total) by mouth once daily.    cetirizine (ZYRTEC) 10 MG tablet Take 1 tablet (10 mg total) by mouth once daily for 10 days     No current facility-administered medications for this visit.      ALLERGIES:   Review of patient's allergies indicates:   Allergen Reactions    Methotrexate analogues      Elevated Liver Enzyme    Bactrim [sulfamethoxazole-trimethoprim] Nausea And Vomiting and Rash       Review of Systems:     Review of Systems   Constitutional: Positive for appetite change and fatigue. Negative for chills, diaphoresis, fever and unexpected weight change.   HENT:   Negative for hearing loss, mouth sores, nosebleeds, sore throat, trouble swallowing and voice change.    Eyes: Negative for eye problems and icterus.   Respiratory: Negative for chest tightness, cough, hemoptysis, shortness of breath and wheezing.    Cardiovascular: Negative for chest pain, leg swelling and palpitations.   Gastrointestinal: Positive for diarrhea and nausea. Negative for abdominal distention, abdominal pain, blood in stool and vomiting.   Endocrine: Negative for hot flashes.   Genitourinary: Negative for bladder incontinence, difficulty urinating, dysuria and hematuria.    Musculoskeletal: Positive for back pain. Negative for arthralgias, flank pain, gait problem, myalgias, neck pain and neck stiffness.   Skin: Negative for itching, rash and wound.   Neurological: Negative for dizziness, extremity weakness, gait problem, headaches, numbness, seizures and speech difficulty.   Hematological: Negative for adenopathy. Does not bruise/bleed easily.   Psychiatric/Behavioral: Positive for sleep disturbance. Negative for confusion and depression. The patient is nervous/anxious.           Physical Exam:     Vitals:    07/31/18 1047   BP: (!) 98/50   Pulse: 85   Resp: 16   Temp: 97.9 °F (36.6 °C)       Physical Exam   Constitutional: She is oriented to  person, place, and time. She appears well-developed and well-nourished. No distress.   In moderate distress secondary to severe nausea and discomfort from bone marrow biopsy   HENT:   Head: Normocephalic and atraumatic.   Mouth/Throat: Oropharynx is clear and moist. No oropharyngeal exudate.   No hair, wearing a cap   Eyes: Conjunctivae and EOM are normal. Pupils are equal, round, and reactive to light.   Neck: Normal range of motion. Neck supple. No JVD present. No tracheal deviation present. No thyromegaly present.   Cardiovascular: Normal rate, regular rhythm and normal heart sounds.  Exam reveals no friction rub.    No murmur heard.  Pulmonary/Chest: Effort normal and breath sounds normal. No stridor. No respiratory distress. She has no wheezes. She has no rales. She exhibits no tenderness.   Abdominal: Soft. Bowel sounds are normal. She exhibits no distension. There is no tenderness. There is no rebound and no guarding.   Musculoskeletal: Normal range of motion. She exhibits no edema, tenderness or deformity.   Lymphadenopathy:     She has no axillary adenopathy.   Neurological: She is alert and oriented to person, place, and time. She displays normal reflexes. No cranial nerve deficit or sensory deficit. She exhibits normal muscle tone. Coordination normal.   Skin: Skin is warm and dry. Capillary refill takes less than 2 seconds. No rash noted. She is not diaphoretic. No erythema. No pallor.   Raised discrete erythematous lesions on bilateral palms   Psychiatric: She has a normal mood and affect. Her behavior is normal. Judgment and thought content normal.       ECOG Performance Status: (foot note - ECOG PS provided by Eastern Cooperative Oncology Group) 2 - Symptomatic, <50% confined to bed    Karnofsky Performance Score:  70%- Cares for Self: Unable to Carry on Normal Activity or Active Work    Labs:   Lab Results   Component Value Date    WBC 8.34 07/31/2018    HGB 8.5 (L) 07/31/2018    HCT 25.5 (L)  07/31/2018     07/31/2018    ALT 9 (L) 07/31/2018    AST 16 07/31/2018     07/31/2018    K 3.3 (L) 07/31/2018     07/31/2018    CREATININE 0.7 07/31/2018    BUN 6 (L) 07/31/2018    CO2 26 07/31/2018    INR 1.1 05/31/2018       Imaging: previous imaging has been reviewed    Assessment and Plan:     Mrs. Mac is a pleasant 68 year old female with recently diagnosed AML.      Acute Monocytic Leukemia  -- Admitted from [facility] early AM with WBC around 100s, for suspected new non-M3 AML  --lo res HLA typing on 5/26/18 and hi res on 5/27/18 done in anticipation of possible need for future transplant   --Pt with two daughters, two full sisters (in their mid 60s, one with brain aneurysm hx, other one without any medical issues), and one full brother (59 y/o healthy).  --NPM1 +, CEBPA (-), FLT3 (+).  On Midostaurin 50 mg PO BID until Day 14 (held starting 6/8 to 6/11). Stopped when FLAG JENNIFER re-induction started. Restarted Midostaurin at 25 mg bid on day 8 of FLAG JENNIFER induction (6/22), increase to full dose 50 mg bid (6/26) as improvement in liver enzymes. Stopped 7/3/18 due to abnormal cardiac echo.  --day 14 bone marrow biopsy completed 6/11 showing persistent disease with 15% CD 34 positive blasts  --Day 32 of FLAG-JENNIFER, tolerating without difficulty except nausea/vomiting  --day 14 restaging bone marrow biopsy done 6/29 without complication, results with no evidence of AML, FLT 3 negative, will need repeat marrow at count recovery outpatient   --recovery marrow done today  --Awaiting derm and marrow path prior to consideration of consolidation therapy  .  Suspected Leukemia Cutis  --awaiting pathology from dermatology    Abnormal Liver Function Tests  --ALT and AST have now normalized. Alk phos 422 today.   --midostaurin started day 8 at 25 mg bid, monitoring liver enzymes closely and improved. Increasing Midostaurin to 50 mg bid 6/26. Stopped Midostaurin (7/3) due to new diagnosis  of systolic heart failure EF 35%.  --6/22/18 liver US showing mild intrahepatic dilation, otherwise impression of hepatic steatosis.  Will hold off of MRCP at this time.     --Slight uptick in alk phos today, will continue to monitor      Chemotherapy induced cardiomyopathy  --cardiology follow-up as outpatient in 3 months  --repeat 2D echo on 6/15 with preserved EF of 60%.  However 7/2/18 echo with reduced EF 30-35%    Bone Pain  --Thought to be secondary to bone marrow biopsy done today    30 minutes were spent face to face with the patient and her  family to discuss the disease, natural history, treatment options and survival statistics. I have provided the patient with an opportunity to ask questions and have all questions answered to her satisfaction.       she will return to clinic following pathology resutls, but knows to call in the interim if symptoms change or should a problem arise.        Laquita Troy MD  Hematology and Medical Oncology  Bone Marrow Transplant  [facility]

## 2018-08-01 ENCOUNTER — TELEPHONE (OUTPATIENT)
Dept: HEMATOLOGY/ONCOLOGY | Facility: CLINIC | Age: 68
End: 2018-08-01

## 2018-08-01 ENCOUNTER — PATIENT MESSAGE (OUTPATIENT)
Dept: HEMATOLOGY/ONCOLOGY | Facility: CLINIC | Age: 68
End: 2018-08-01

## 2018-08-01 LAB
BODY SITE - BONE MARROW: NORMAL
CLINICAL DIAGNOSIS - BONE MARROW: NORMAL
FLOW CYTOMETRY ANTIBODIES ANALYZED - BONE MARROW: NORMAL
FLOW CYTOMETRY COMMENT - BONE MARROW: NORMAL
FLOW CYTOMETRY INTERPRETATION - BONE MARROW: NORMAL

## 2018-08-01 NOTE — TELEPHONE ENCOUNTER
----- Message from Ej Lugo sent at 8/1/2018  2:56 PM CDT -----  Contact: Pt   Will like a call from staff in regards to questions health plan     Contact::[phone]      Called patient and left message to call back.

## 2018-08-02 ENCOUNTER — PATIENT MESSAGE (OUTPATIENT)
Dept: HEMATOLOGY/ONCOLOGY | Facility: CLINIC | Age: 68
End: 2018-08-02

## 2018-08-02 ENCOUNTER — TELEPHONE (OUTPATIENT)
Dept: HEMATOLOGY/ONCOLOGY | Facility: CLINIC | Age: 68
End: 2018-08-02

## 2018-08-02 DIAGNOSIS — C92.00 ACUTE MYELOID LEUKEMIA NOT HAVING ACHIEVED REMISSION: Primary | ICD-10-CM

## 2018-08-02 LAB
AML FISH ADDITIONAL INFORMATION (BM): NORMAL
AML FISH DISCLAIMER (BM): NORMAL
AML FISH REASON FOR REFERRAL (BM): NORMAL
AML FISH RELEASED BY (BM): NORMAL
AML FISH RESULT (BM): NORMAL
AML FISH RESULT SUMMARY (BM): NORMAL
AML FISH RESULT TABLE (BM): NORMAL
CLINICAL CYTOGENETICIST REVIEW: NORMAL
FAMLB SPECIMEN: NORMAL
REF LAB TEST METHOD: NORMAL
SPECIMEN SOURCE: NORMAL

## 2018-08-02 RX ORDER — HYDROCORTISONE 25 MG/G
CREAM TOPICAL 2 TIMES DAILY
Qty: 28 G | Refills: 3 | Status: ON HOLD | OUTPATIENT
Start: 2018-08-02 | End: 2018-09-16 | Stop reason: HOSPADM

## 2018-08-02 RX ORDER — LORAZEPAM 0.5 MG/1
0.5 TABLET ORAL EVERY 6 HOURS PRN
Qty: 60 TABLET | Refills: 0 | Status: ON HOLD | OUTPATIENT
Start: 2018-08-02 | End: 2020-10-28 | Stop reason: HOSPADM

## 2018-08-02 NOTE — TELEPHONE ENCOUNTER
I spoke to Mrs. Mac and her daughter regarding the skin and bone marrow biopsy. Both biopsies show no signs of acute leukemia. We will give her an additional 10 off to recover from induction therapy before proceeding with [facility].

## 2018-08-02 NOTE — PHYSICIAN QUERY
PT Name: Noreen Mac  MR #: [id number]    Physician Query Form - Pathology Findings Clarification     Madonna Harmon RN CDS    Contact Information: [phone]    This form is a permanent document in the medical record.     Query Date: August 2, 2018      By submitting this query, we are merely seeking further clarification of documentation.  Please utilize your independent clinical judgment when addressing the question(s) below.      The medical record contains the following:     Findings Supporting Clinical Information Location in Medical Record   FINAL PATHOLOGIC DIAGNOSIS  BONE MARROW ASPIRATE SMEARS, TOUCH IMPRINTS, CORE BIOPSY, RIGHT ILIAC CREST, AND CLOT  SECTION:  --Normocellular bone marrow with trilineage hematopoiesis and no morphologic or immunophenotypic evidence of  residual acute myeloid leukemia, see comment.  --Markedly increased storage iron SPECIMEN  1) Bone marrow clot, right iliac crest.  2) Bone marrow core biopsy, right iliac crest.  3) Bone marrow aspirate (slides). 7/31 Pathology report     Please document the clinical significance of the Pathologists findings of __no morphologic or immunophenotypic evidence of  residual acute myeloid leukemia, _____________________.          [X ] I agree with the Pathology Findings        [  ] I do not agree with the Pathology Findings        [  ] Clinically Insignificant        [  ] Clinically Undetermined        [  ] Other/Clarification of Findings: ______________________________________________    Please document in your progress notes daily for the duration of treatment until resolved and include in your discharge summary.

## 2018-08-03 LAB
FLT3 RESULT: NORMAL
PATH REPORT.FINAL DX SPEC: NORMAL
SPECIMEN TYPE: NORMAL

## 2018-08-06 ENCOUNTER — TELEPHONE (OUTPATIENT)
Dept: HEMATOLOGY/ONCOLOGY | Facility: CLINIC | Age: 68
End: 2018-08-06

## 2018-08-06 NOTE — TELEPHONE ENCOUNTER
tried reaching out to pt on today in regards to upcoming appointments but no answer,a detail message was left on v/m to contact office to discuss if need be.

## 2018-08-08 LAB
CHROM BANDING METHOD: NORMAL
CHROMOSOME ANALYSIS BM ADDITIONAL INFORMATION: NORMAL
CHROMOSOME ANALYSIS BM RELEASED BY: NORMAL
CHROMOSOME ANALYSIS BM RESULT SUMMARY: NORMAL
CLINICAL CYTOGENETICIST REVIEW: NORMAL
KARYOTYP MAR: NORMAL
REASON FOR REFERRAL (NARRATIVE): NORMAL
REF LAB TEST METHOD: NORMAL
SPECIMEN SOURCE: NORMAL
SPECIMEN: NORMAL

## 2018-08-09 NOTE — PHYSICIAN QUERY
PT Name: Noreen Mac  MR #: [id number]    Physician Query Form - Pathology Findings Clarification     Madonna Harmon RN CDS    Contact Information: [phone]  This form is a permanent document in the medical record.     Query Date: August 9, 2018      By submitting this query, we are merely seeking further clarification of documentation.  Please utilize your independent clinical judgment when addressing the question(s) below.      The medical record contains the following:     Findings Supporting Clinical Information Location in Medical Record   FINAL PATHOLOGIC DIAGNOSIS  1. Skin, right palm, punch biopsy:  - DERMAL EDEMA AND A SPARSE PERIVASCULAR AND INTERSTITIAL CHRONIC INFLAMMATORY  INFILTRATE (SEE COMMENT).  MICROSCOPIC DESCRIPTION: Sections show a punch biopsy of acral skin extending to the level of the deep  reticular dermis. The epidermis exhibits minimal focal spongiosis, but it is otherwise unremarkable. There is focal  papillary dermal edema. The dermis contains prominent dilated, telangiectatic, thin-walled vessels with a sparse  perivascular and interstitial lymphohistiocytic infiltrate. Multiple levels were examined.  COMMENT: These histologic features are relatively mild and nonspecific. Features of the considered clinical  diagnoses of Sweet's syndrome or leukemia cutis are not identified. A viral exanthem or a drug eruption cannot be  entirely excluded. Clinical correlation is advised. dm  PreOperative Diagnosis  68 year old female with AML, status post induction chemo with pancytopenia, receiving Neulasta injections, with new  onset rash on hands. Tender deep-seated erythematous papulovesicles on digits and palm x 1 day. DDX: Sweet  syndrome vs. viral infection vs. other. Rule out leukemia cutis.  SPECIMEN  1) Right palm. 7/26 Pathology report     Please document the clinical significance of the Pathologists findings of _diagnoses of Sweet's syndrome or leukemia cutis are not  identified.______________________.          [ X ] I agree with the Pathology Findings        [  ] I do not agree with the Pathology Findings        [  ] Clinically Insignificant        [  ] Clinically Undetermined        [  ] Other/Clarification of Findings: ______________________________________________    Please document in your progress notes daily for the duration of treatment until resolved and include in your discharge summary.

## 2018-08-13 NOTE — PROGRESS NOTES
Hematology and Medical Oncology   Follow Up Note     08/14/2018    Primary Oncologic Diagnosis: AML, FLT 3 + and NPMN1+.   History of Present Ilness:   Noreen Mac (Noreen) is a pleasant 68 y.o.female presents to clinic following 2 induction therapies for AML. She was in the hospital roughly 50 days to receive 7+3 and then FLAG JENNIFER.    Oncology History:   68 y.o. female admitted overnight for possible new AML. Came in from OSH with elevated WBC of 100.   05/25/2018: Pt is now on hydrea 2 grams BID, allopurinol 300 mg daily, and IVF. Pt may need rasburicase this weekend. She will undergo a BM bx and aspiration today. She is an induction candidate and will not be screened for research trials. She has anxiety for which she usually takes xanax, this was re-started.   05/26/2018 PICC placed. Reports she slept well last night. Anxiety improved with prn xanax. EF 65%, HIV and Hep panel negative. 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  6/12/2018: Day 15 of 7+3 induction, restaging BM bx done yesterday. On Midostaurin. Fever yesterday and BC with gram + cocci in clusters. On cefepime day 2 and will start Vanc today. Labial mucositis improving.   6/13/2018: Day 16 7+3. BC with staph aureus, identification pending. Surveillance blood cultures done today. Afebrile x 48 hours. On cefepime and Vanc. Labial mucositis resolved. No complaints of pain. Nausea controlled. No diarrhea. Primary complaint is fatigue.   6/14/20018: Day 17 of 7+3. Day 14 bone marrow results pending. BC with staph epidermis only sensitive to Vancomycin. Vanc day 3 and cefepime day 4. Vanc trough 12.2 last night. BP remains low but stable. Afebrile x 72 hours.   6/15/2018: Day 18 of 7+3. Day 14 bone marrow biopsy shows persistent disease with 15% blasts. Discussion with patient regarding treatment and she is deciding if she wants to proceed with re-induction vs outpatient maintenance. Temp of 100.4 overnight, unsustained. Day 5 Cefepime and Day 4 of  Vanc for staph epidermis. Repeat cultures NGTD. BP improved. Electrolytes (mag, phos, K and calcium) replaced aggressively this am and will repeat labs this afternoon. No complaints this am.   06/16/2018: Day 19 of 7+3. Day 2 of Flag-JENNIFER. Remains afebrile. Calcium remains low and have increased PO supplementation. Remains on vanc and aztreonam. Somewhat loopy feeling after receiving benadryl.   06/17/2018: Day 20 of 7+3. Day 3 of FLAG-JENNIFER. Multiple electrolyte abnormalities. New bilateral leg and feet swelling.   6/18/2018: Day 21 of 7+3 and Day 4 of FLAG JENNIFER. Tolerating well with some nausea controlled with Zofran. VSS, afebrile. ANC 1900 on Neupogen. Continues Vanc for staph epi bacteremia. Multiple electrolytes replaced today. Complains of edema to lower extremities, not improved after 20 of lasix yesterday. No sob or CP.   6/19/18: Day 22 of 7+3 and Day 5 of FLAG JENNIFER. VSS, afebrile, ANC 3900. Vanc trough elevated and dose adjusted this am. Lower extremity edema improved after 40 of lasix yesterday, net negative 300 cc I&O. Mild nausea, no abdominal pain or diarrhea. Poor appetite but no difficulty eating or taking pills.   6/20/18: Day 23 of 7+3 and Day 6 of FLAG JENNIFER. Patient complains of nausea and vomiting overnight and this am, especially when taking pills. Will add Zyprexa daily in addition to Zofran, compazine, and ativan PRN and will change meds to IV. 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 Afebrile.  No diarrhea, mouth sores or pain.  06/21/2018: Day 24 of 7+3 and Day 7 of FLAG JENNIFER. Nausea better controlled. Continued on Vanc.  6/22/2018: Day 25 of 7+3 and Day 8 of FLAG JENNIFER. Nausea improved some with Zyprexa but did have 1 episode of emesis overnight.continuing meds IV due to vomiting. Remains afebrile. ANC 0. Continues Vanc and Flagyl. Electrolytes replaced.   06/23/2018 : day 26 of 7+3 and Day 9 of FLAG JENNIFER.  Nausea persists,  worsened after taking pills so meds converted to IV.  Encouraged ambulation.  Continue with flagyl for leptotrichia goodfellowii.  RUQ US showed intrahepatic dilation, overall impression hepatic steatosis.  CBD 0.5cm.  No Gallbladder.    06/24/2018 : day 27 of 7+3 and Day 10 of FLAG JENNIFER.  Nausea persisting, able to tolerate some po pills.  Last day of flagyl (day 5).  Still pancytopenic. Appetite still low as po intake triggers nausea.  06/25/2018: day 28 of 7+3 and Day 11 of FLAG JENNIFER. Afebrile, ANC 0. Has completed Flagyl. Will decrease Vanc to 1000 mg q12, trough 19.9, will complete Vanc on 6/27. Liver enzymes stable on Midostaurin. VSS.   6/26/2018: day 29 of 7+3 and day 12 of FLAG JENNIFER. Liver enzymes improved and Midostaurin increased to full dose 50 mg bid. Nausea controlled, afebrile, VSS, NEON.  6/27/2018: day 30 of 7+3 and Day 13 of FLAG JENNIFER. Persistent nausea and emesis x 1 yesterday. Compazine changed to scheduled. Vanc ends today. Afebrile, VSS. Aggressive electrolyte replacement, low electrolytes possibly due to Midostaurin.   6/28/2018: day 31 of 7+3 and Day 14 of FLAG JENNIFER. Nausea improved with scheduled Compazine. Patient has agreed to start converting some IV meds to po. VSS, afebrile, electrolytes wnl today. Only complains of fatigue, new rash noted to upper back and chest and legs, papular rash mildly red.  6/29/2018: Day 32 of 7+3 and Day 15 of FLAG JENNIFER. Day 14 restaging bone marrow biopsy done at bedside today. Patient states nausea improved but she did have emesis last night after taking 9 pm pills. Rash improved. No mouth sores, diarrhea or pain.   7/2/2018: Day 35 of 7+3 and Day 18 of FLAG JENNIFER. Restaging BM bx results pending. Fluid overload over the weekend. Chest x-ray with pulmonary edema. Os sats down to 88%. Placed on O2 at 2 L NC, off O2 this am. Received lasix. Net negative 2.7 L today. 1 episode of nausea, no emesis. Reports anxiety relieved with PRN meds. Electrolytes improved,  afebrile, VSS.   7/3/2018: Day 36 of 7+3 and Day 19 of FLAG JENNIFER. Restaging Bm bx with GABRIEL. Cardiology consulted for abnormal echo, EF 30% with pulmonary HTN and severe L atrial enlargement. EKG with T wave abnormality, possible anterolateral ischemia and prolonged QTc of 530. Medications adjusted. Nausea controlled, no diarrhea. Electrolytes improved. On O2 as needed.   07/04/2018 : Day 37 of 7+3 and Day 20 of FLAG JENNIFER Diarrhea in AM, watery, no associated abdominal pain, nausea, or vomiting.    07/05/2018: Day 38 of 7+3 ane Day 21 of FLAG JENNIFER. No CP or SOB, cardiology following. On RA. All meds changed to po, denies nausea or vomiting and no diarrhea. VSS, afebrile.   7/6/2018: Day 22 of FLAG JENNIFER. Continues to tolerate po meds with no nausea. Afebrile. Awaiting count recovery for discharge.  7/7/2018-/8/2018: Day 40/41 of 7+3, Day 23/24 of FLAG JENNIFER.  Improving clinically.  Started on mag oxide per patient request.  Labs showing signs of ANC recovery.  7/9/2018: Day 25 FLAG JENNIFER, , continues Neupogen. Received platelets today. Afebrile, VSS. Tolerating all oral meds with no nausea or vomiting. Only complains of fatigue.   7/10/2018: Day 26 FLAG JENNIFER,  today. Had 2 episodes of vomiting and diarrhea last night. Feeling better. Afebrile, VSS.   7/11/2018: Day 27 FLAG JENNIFER,  today. No vomiting or diarrhea overnight and no nausea. Afebrile, VSS. Complains of back pain (bone pain) suspect due to count recovery. Relieved with oxy IR and Zyrtec started.  07/12/2018: Day 28 flag jennifer, engrafted today with ANC of 730. Back pain improved with zyrtec. Scheduled for IT chemo tomorrow at 1:30 pm and discharge home thereafter. Will likely need plt transfusion prior to IT chemo tomorrow   7/13/2018: Day 29 of FLAG JENNIFER. ANC up to 1300 today. Transfusing platelets today prior to LP for IT chemo. Patient continues to complain of bone pain, mostly to back and legs. No nausea, diarrhea, sob. Afebrile and VSS.    --hospitalized 7/23-7/25 for C.diff diarrhea, neutropenic fever.  While inpatient she developed pink blisters on her palm that were concerning for relapse.  --palm biopsy on 7/26/18 for suspicion of leukemia cutis was also negative for sign of relapse  --bone marrow biopsy 7/31/18 was negative for signs of relapse     Interval History:   Ms. Mac has done increasingly better was hospitalized for neutropenic fever since her last clinic visit. Her appetite is greatly improve, her nausea has resolved, her pain has resolved. The only lingering symptom if ongoing fatigue.     Past Medical History:   Past Medical History:   Diagnosis Date    Hypertension        Current Medications:   Current Outpatient Medications   Medication Sig    acyclovir (ZOVIRAX) 200 MG capsule Take 2 capsules (400 mg total) by mouth 2 (two) times daily.    ALPRAZolam (XANAX) 0.25 MG tablet Take 1 tablet (0.25 mg total) by mouth 3 (three) times daily as needed for Anxiety.    cetirizine (ZYRTEC) 10 MG tablet Take 1 tablet (10 mg total) by mouth once daily for 10 days    ciprofloxacin HCl (CIPRO) 500 MG tablet Take 1 tablet (500 mg total) by mouth every 12 (twelve) hours.    escitalopram oxalate (LEXAPRO) 10 MG tablet Take 10 mg by mouth once daily.    fluconazole (DIFLUCAN) 200 MG Tab Take 2 tablets (400 mg total) by mouth once daily.    hydrocortisone 2.5 % cream Apply topically 2 (two) times daily. Apply to affected area on palms for 10 days    LORazepam (ATIVAN) 0.5 MG tablet Take 1 tablet (0.5 mg total) by mouth every 6 (six) hours as needed (nausea).    magnesium oxide (MAG-OX) 400 mg tablet Take 2 tablets (800 mg total) by mouth 2 (two) times daily.    metoprolol succinate (TOPROL-XL) 25 MG 24 hr tablet Take 1 tablet (25 mg total) by mouth once daily.    morphine (MS CONTIN) 15 MG 12 hr tablet Take 2 tablets (30 mg total) by mouth 2 (two) times daily.    morphine (MSIR) 15 MG tablet Take 1 tablet (15 mg total) by mouth every  4 (four) hours as needed.    omeprazole (PRILOSEC OTC) 20 MG tablet Take 20 mg by mouth every morning.    ondansetron (ZOFRAN) 8 MG tablet Take 1 tablet (8 mg total) by mouth every 12 (twelve) hours as needed for Nausea.    prochlorperazine (COMPAZINE) 10 MG tablet Take 1 tablet (10 mg total) by mouth 4 (four) times daily.    vitamin D 1000 units Tab Take 1 tablet (1,000 Units total) by mouth once daily.     No current facility-administered medications for this visit.      ALLERGIES:   Review of patient's allergies indicates:   Allergen Reactions    Methotrexate analogues      Elevated Liver Enzyme    Bactrim [sulfamethoxazole-trimethoprim] Nausea And Vomiting and Rash       Review of Systems:     Review of Systems   Constitutional: Positive for appetite change and fatigue. Negative for chills, diaphoresis, fever and unexpected weight change.   HENT:   Negative for hearing loss, mouth sores, nosebleeds, sore throat, trouble swallowing and voice change.    Eyes: Negative for eye problems and icterus.   Respiratory: Negative for chest tightness, cough, hemoptysis, shortness of breath and wheezing.    Cardiovascular: Negative for chest pain, leg swelling and palpitations.   Gastrointestinal: Negative for abdominal distention, abdominal pain, blood in stool, diarrhea, nausea and vomiting.   Endocrine: Negative for hot flashes.   Genitourinary: Negative for bladder incontinence, difficulty urinating, dysuria and hematuria.    Musculoskeletal: Negative for arthralgias, back pain, flank pain, gait problem, myalgias, neck pain and neck stiffness.   Skin: Negative for itching, rash and wound.   Neurological: Negative for dizziness, extremity weakness, gait problem, headaches, numbness, seizures and speech difficulty.   Hematological: Negative for adenopathy. Does not bruise/bleed easily.   Psychiatric/Behavioral: Positive for sleep disturbance. Negative for confusion and depression. The patient is nervous/anxious.            Physical Exam:     Vitals:    08/14/18 1505   BP: (!) 102/51   Pulse: 107   Temp: 98.8 °F (37.1 °C)       Physical Exam   Constitutional: She is oriented to person, place, and time. She appears well-developed and well-nourished. No distress.   HENT:   Head: Normocephalic and atraumatic.   Mouth/Throat: Oropharynx is clear and moist. No oropharyngeal exudate.   No hair, wearing a cap   Eyes: Conjunctivae and EOM are normal. Pupils are equal, round, and reactive to light.   Neck: Normal range of motion. Neck supple. No JVD present. No tracheal deviation present. No thyromegaly present.   Cardiovascular: Normal rate, regular rhythm and normal heart sounds. Exam reveals no friction rub.   No murmur heard.  Pulmonary/Chest: Effort normal and breath sounds normal. No stridor. No respiratory distress. She has no wheezes. She has no rales. She exhibits no tenderness.   Abdominal: Soft. Bowel sounds are normal. She exhibits no distension. There is no tenderness. There is no rebound and no guarding.   Musculoskeletal: Normal range of motion. She exhibits no edema, tenderness or deformity.   Lymphadenopathy:     She has no axillary adenopathy.   Neurological: She is alert and oriented to person, place, and time. She displays normal reflexes. No cranial nerve deficit or sensory deficit. She exhibits normal muscle tone. Coordination normal.   Skin: Skin is warm and dry. Capillary refill takes less than 2 seconds. No rash noted. She is not diaphoretic. No erythema. No pallor.   Raised discrete erythematous lesions on bilateral palms - resolved   Psychiatric: She has a normal mood and affect. Her behavior is normal. Judgment and thought content normal.       ECOG Performance Status: (foot note - ECOG PS provided by Eastern Cooperative Oncology Group) 2 - Symptomatic, <50% confined to bed    Karnofsky Performance Score:  70%- Cares for Self: Unable to Carry on Normal Activity or Active Work    Labs:   Lab Results   Component  Value Date    WBC 4.93 08/15/2018    HGB 7.4 (L) 08/15/2018    HCT 23.4 (L) 08/15/2018     08/15/2018    ALT 41 08/15/2018     (H) 08/15/2018     08/15/2018    K 4.6 08/15/2018     08/15/2018    CREATININE 0.7 08/15/2018    BUN 12 08/15/2018    CO2 21 (L) 08/15/2018    INR 1.1 05/31/2018       Imaging: previous imaging has been reviewed    Assessment and Plan:     Mrs. Mac is a pleasant 68 year old female with recently diagnosed AML.      Acute Monocytic Leukemia  -- Admitted from [facility] on 5/25/18 early AM with WBC around 100s, for suspected new non-M3 AML  --lo res HLA typing on 5/26/18 and hi res on 5/27/18 done in anticipation of possible need for future transplant   --Pt with two daughters, two full sisters (in their mid 60s, one with brain aneurysm hx, other one without any medical issues), and one full brother (59 y/o healthy).  --NPM1 +, CEBPA (-), FLT3 (+).  On Midostaurin 50 mg PO BID until Day 14 (held starting 6/8 to 6/11). Stopped when FLAG JENNIFER re-induction started. Restarted Midostaurin at 25 mg bid on day 8 of FLAG JENNIFER induction (6/22), increase to full dose 50 mg bid (6/26) as improvement in liver enzymes. Stopped 7/3/18 due to abnormal cardiac echo.  --day 14 bone marrow biopsy completed 6/11 showing persistent disease with 15% CD 34 positive blasts  --Day 60 of FLAG-JENNIFER, tolerating without difficulty except nausea/vomiting  --day 14 restaging bone marrow biopsy done 6/29 without complication, results with no evidence of AML, FLT 3 negative, will need repeat marrow at count recovery outpatient   --recovery marrow showed no evidence of disease  --consolidation therapy of HiDAC #1 planned for today  --Between consolidation therapies she will require bid labs locally   .  Suspected Leukemia Cutis  --pathology from dermatology confirmed inflammatory changes  --now resolved with topical steroid cream    Abnormal Liver Function Tests  --ALT and AST have now  normalized. Alk phos 169 today.   --midostaurin started day 8 at 25 mg bid, monitoring liver enzymes closely and improved. Increasing Midostaurin to 50 mg bid 6/26. Stopped Midostaurin (7/3) due to new diagnosis of systolic heart failure EF 35%.  --6/22/18 liver US showing mild intrahepatic dilation, otherwise impression of hepatic steatosis.  Will hold off of MRCP at this time.     --continue to monitor alk phos    Chemotherapy induced cardiomyopathy  --cardiology follow-up as outpatient in 3 months  --repeat 2D echo on 6/15 with preserved EF of 60%.  However 7/2/18 echo with reduced EF 30-35%      30 minutes were spent face to face with the patient and her  family to discuss the disease, natural history, treatment options and survival statistics. I have provided the patient with an opportunity to ask questions and have all questions answered to her satisfaction.       she will return to clinic in 2 weeks, but knows to call in the interim if symptoms change or should a problem arise.        Laquita Troy MD  Hematology and Medical Oncology  Bone Marrow Transplant  [facility]

## 2018-08-14 ENCOUNTER — HOSPITAL ENCOUNTER (INPATIENT)
Facility: HOSPITAL | Age: 68
LOS: 5 days | Discharge: HOME | DRG: 837 | End: 2018-08-19
Attending: INTERNAL MEDICINE | Admitting: INTERNAL MEDICINE
Payer: MEDICARE

## 2018-08-14 ENCOUNTER — OFFICE VISIT (OUTPATIENT)
Dept: HEMATOLOGY/ONCOLOGY | Facility: CLINIC | Age: 68
DRG: 837 | End: 2018-08-14
Payer: MEDICARE

## 2018-08-14 VITALS
TEMPERATURE: 99 F | SYSTOLIC BLOOD PRESSURE: 102 MMHG | HEART RATE: 107 BPM | BODY MASS INDEX: 26.45 KG/M2 | WEIGHT: 143.75 LBS | HEIGHT: 62 IN | OXYGEN SATURATION: 100 % | DIASTOLIC BLOOD PRESSURE: 51 MMHG

## 2018-08-14 DIAGNOSIS — T45.1X5A CINV (CHEMOTHERAPY-INDUCED NAUSEA AND VOMITING): ICD-10-CM

## 2018-08-14 DIAGNOSIS — E43 SEVERE MALNUTRITION: ICD-10-CM

## 2018-08-14 DIAGNOSIS — C93.01 ACUTE MONOCYTIC LEUKEMIA IN REMISSION: ICD-10-CM

## 2018-08-14 DIAGNOSIS — C93.00 ACUTE MONOCYTIC LEUKEMIA NOT HAVING ACHIEVED REMISSION: ICD-10-CM

## 2018-08-14 DIAGNOSIS — C92.00 ACUTE MYELOID LEUKEMIA NOT HAVING ACHIEVED REMISSION: ICD-10-CM

## 2018-08-14 DIAGNOSIS — R74.8 ABNORMAL LIVER ENZYMES: ICD-10-CM

## 2018-08-14 DIAGNOSIS — C92.00 AML (ACUTE MYELOBLASTIC LEUKEMIA): Primary | ICD-10-CM

## 2018-08-14 DIAGNOSIS — Z86.19 HISTORY OF CLOSTRIDIUM DIFFICILE INFECTION: ICD-10-CM

## 2018-08-14 DIAGNOSIS — T45.1X5A CHEMOTHERAPY INDUCED CARDIOMYOPATHY: ICD-10-CM

## 2018-08-14 DIAGNOSIS — I42.9 CARDIOMYOPATHY: ICD-10-CM

## 2018-08-14 DIAGNOSIS — C92.00 ACUTE MYELOID LEUKEMIA NOT HAVING ACHIEVED REMISSION: Primary | ICD-10-CM

## 2018-08-14 DIAGNOSIS — I10 HYPERTENSION, UNSPECIFIED TYPE: ICD-10-CM

## 2018-08-14 DIAGNOSIS — R11.2 CINV (CHEMOTHERAPY-INDUCED NAUSEA AND VOMITING): ICD-10-CM

## 2018-08-14 DIAGNOSIS — R21 RASH OF HANDS: ICD-10-CM

## 2018-08-14 DIAGNOSIS — E83.42 HYPOMAGNESEMIA: ICD-10-CM

## 2018-08-14 DIAGNOSIS — I42.7 CHEMOTHERAPY INDUCED CARDIOMYOPATHY: ICD-10-CM

## 2018-08-14 PROBLEM — R50.81 NEUTROPENIC FEVER: Status: RESOLVED | Noted: 2018-07-24 | Resolved: 2018-08-14

## 2018-08-14 PROBLEM — D70.9 NEUTROPENIC FEVER: Status: RESOLVED | Noted: 2018-07-24 | Resolved: 2018-08-14

## 2018-08-14 PROBLEM — E87.8 ELECTROLYTE ABNORMALITY: Status: ACTIVE | Noted: 2018-06-08

## 2018-08-14 PROBLEM — I95.9 HYPOTENSION: Status: RESOLVED | Noted: 2018-07-23 | Resolved: 2018-08-14

## 2018-08-14 PROCEDURE — 99215 OFFICE O/P EST HI 40 MIN: CPT | Mod: S$PBB,,, | Performed by: INTERNAL MEDICINE

## 2018-08-14 PROCEDURE — 99999 PR PBB SHADOW E&M-EST. PATIENT-LVL IV: CPT | Mod: PBBFAC,,, | Performed by: INTERNAL MEDICINE

## 2018-08-14 PROCEDURE — 20600001 HC STEP DOWN PRIVATE ROOM

## 2018-08-14 PROCEDURE — 63600175 PHARM REV CODE 636 W HCPCS: Performed by: INTERNAL MEDICINE

## 2018-08-14 PROCEDURE — 63600175 PHARM REV CODE 636 W HCPCS: Performed by: NURSE PRACTITIONER

## 2018-08-14 PROCEDURE — 3E03305 INTRODUCTION OF OTHER ANTINEOPLASTIC INTO PERIPHERAL VEIN, PERCUTANEOUS APPROACH: ICD-10-PCS | Performed by: INTERNAL MEDICINE

## 2018-08-14 PROCEDURE — 25000003 PHARM REV CODE 250: Performed by: INTERNAL MEDICINE

## 2018-08-14 PROCEDURE — 99214 OFFICE O/P EST MOD 30 MIN: CPT | Mod: PBBFAC | Performed by: INTERNAL MEDICINE

## 2018-08-14 PROCEDURE — 25000003 PHARM REV CODE 250: Performed by: NURSE PRACTITIONER

## 2018-08-14 RX ORDER — LORAZEPAM 0.5 MG/1
0.5 TABLET ORAL EVERY 6 HOURS PRN
Status: DISCONTINUED | OUTPATIENT
Start: 2018-08-14 | End: 2018-08-19 | Stop reason: HOSPADM

## 2018-08-14 RX ORDER — SODIUM CHLORIDE 0.9 % (FLUSH) 0.9 %
5 SYRINGE (ML) INJECTION
Status: DISCONTINUED | OUTPATIENT
Start: 2018-08-14 | End: 2018-08-19 | Stop reason: HOSPADM

## 2018-08-14 RX ORDER — ONDANSETRON 4 MG/1
8 TABLET, FILM COATED ORAL EVERY 12 HOURS PRN
Status: DISCONTINUED | OUTPATIENT
Start: 2018-08-14 | End: 2018-08-19 | Stop reason: HOSPADM

## 2018-08-14 RX ORDER — ESCITALOPRAM OXALATE 5 MG/1
10 TABLET ORAL DAILY
Status: DISCONTINUED | OUTPATIENT
Start: 2018-08-15 | End: 2018-08-19 | Stop reason: HOSPADM

## 2018-08-14 RX ORDER — DEXAMETHASONE 0.5 MG/1
8 TABLET ORAL
Status: CANCELLED | OUTPATIENT
Start: 2018-08-18

## 2018-08-14 RX ORDER — HEPARIN 100 UNIT/ML
300 SYRINGE INTRAVENOUS
Status: DISCONTINUED | OUTPATIENT
Start: 2018-08-14 | End: 2018-08-19 | Stop reason: HOSPADM

## 2018-08-14 RX ORDER — SODIUM CHLORIDE 9 MG/ML
INJECTION, SOLUTION INTRAVENOUS CONTINUOUS
Status: DISCONTINUED | OUTPATIENT
Start: 2018-08-14 | End: 2018-08-19 | Stop reason: HOSPADM

## 2018-08-14 RX ORDER — DEXAMETHASONE SODIUM PHOSPHATE 1 MG/ML
1 SOLUTION/ DROPS OPHTHALMIC EVERY 6 HOURS
Status: CANCELLED | OUTPATIENT
Start: 2018-08-14

## 2018-08-14 RX ORDER — ONDANSETRON 4 MG/1
8 TABLET, FILM COATED ORAL
Status: CANCELLED | OUTPATIENT
Start: 2018-08-16

## 2018-08-14 RX ORDER — SODIUM CHLORIDE 0.9 % (FLUSH) 0.9 %
10 SYRINGE (ML) INJECTION
Status: CANCELLED | OUTPATIENT
Start: 2018-08-14

## 2018-08-14 RX ORDER — ONDANSETRON 4 MG/1
8 TABLET, FILM COATED ORAL
Status: COMPLETED | OUTPATIENT
Start: 2018-08-14 | End: 2018-08-15

## 2018-08-14 RX ORDER — DEXAMETHASONE 0.5 MG/1
8 TABLET ORAL
Status: CANCELLED | OUTPATIENT
Start: 2018-08-14

## 2018-08-14 RX ORDER — DEXAMETHASONE 4 MG/1
8 TABLET ORAL
Status: COMPLETED | OUTPATIENT
Start: 2018-08-14 | End: 2018-08-15

## 2018-08-14 RX ORDER — ACYCLOVIR 200 MG/1
400 CAPSULE ORAL 2 TIMES DAILY
Status: DISCONTINUED | OUTPATIENT
Start: 2018-08-14 | End: 2018-08-19 | Stop reason: HOSPADM

## 2018-08-14 RX ORDER — DEXAMETHASONE 0.5 MG/1
8 TABLET ORAL
Status: CANCELLED | OUTPATIENT
Start: 2018-08-16

## 2018-08-14 RX ORDER — DEXAMETHASONE SODIUM PHOSPHATE 1 MG/ML
1 SOLUTION/ DROPS OPHTHALMIC EVERY 6 HOURS
Status: DISCONTINUED | OUTPATIENT
Start: 2018-08-14 | End: 2018-08-19 | Stop reason: HOSPADM

## 2018-08-14 RX ORDER — PANTOPRAZOLE SODIUM 40 MG/1
40 TABLET, DELAYED RELEASE ORAL DAILY
Status: DISCONTINUED | OUTPATIENT
Start: 2018-08-15 | End: 2018-08-19 | Stop reason: HOSPADM

## 2018-08-14 RX ORDER — SODIUM CHLORIDE 9 MG/ML
INJECTION, SOLUTION INTRAVENOUS CONTINUOUS
Status: CANCELLED | OUTPATIENT
Start: 2018-08-14

## 2018-08-14 RX ORDER — HEPARIN 100 UNIT/ML
300 SYRINGE INTRAVENOUS
Status: CANCELLED | OUTPATIENT
Start: 2018-08-14

## 2018-08-14 RX ORDER — ONDANSETRON 4 MG/1
8 TABLET, FILM COATED ORAL
Status: COMPLETED | OUTPATIENT
Start: 2018-08-16 | End: 2018-08-17

## 2018-08-14 RX ORDER — METOPROLOL SUCCINATE 25 MG/1
25 TABLET, EXTENDED RELEASE ORAL DAILY
Status: DISCONTINUED | OUTPATIENT
Start: 2018-08-15 | End: 2018-08-15

## 2018-08-14 RX ORDER — SODIUM CHLORIDE 0.9 % (FLUSH) 0.9 %
10 SYRINGE (ML) INJECTION
Status: DISCONTINUED | OUTPATIENT
Start: 2018-08-14 | End: 2018-08-19 | Stop reason: HOSPADM

## 2018-08-14 RX ORDER — ONDANSETRON 4 MG/1
8 TABLET, FILM COATED ORAL
Status: DISCONTINUED | OUTPATIENT
Start: 2018-08-18 | End: 2018-08-19 | Stop reason: HOSPADM

## 2018-08-14 RX ORDER — ALPRAZOLAM 0.25 MG/1
0.25 TABLET ORAL 3 TIMES DAILY PRN
Status: DISCONTINUED | OUTPATIENT
Start: 2018-08-14 | End: 2018-08-19 | Stop reason: HOSPADM

## 2018-08-14 RX ORDER — PROCHLORPERAZINE MALEATE 5 MG
10 TABLET ORAL 4 TIMES DAILY
Status: DISCONTINUED | OUTPATIENT
Start: 2018-08-14 | End: 2018-08-16

## 2018-08-14 RX ORDER — ENOXAPARIN SODIUM 100 MG/ML
40 INJECTION SUBCUTANEOUS EVERY 24 HOURS
Status: DISCONTINUED | OUTPATIENT
Start: 2018-08-14 | End: 2018-08-19 | Stop reason: HOSPADM

## 2018-08-14 RX ORDER — DEXAMETHASONE 4 MG/1
8 TABLET ORAL
Status: COMPLETED | OUTPATIENT
Start: 2018-08-16 | End: 2018-08-17

## 2018-08-14 RX ORDER — MORPHINE SULFATE 15 MG/1
15 TABLET ORAL EVERY 4 HOURS PRN
Status: DISCONTINUED | OUTPATIENT
Start: 2018-08-14 | End: 2018-08-16

## 2018-08-14 RX ORDER — ONDANSETRON 4 MG/1
8 TABLET, FILM COATED ORAL
Status: CANCELLED | OUTPATIENT
Start: 2018-08-14

## 2018-08-14 RX ORDER — MORPHINE SULFATE 30 MG/1
30 TABLET, FILM COATED, EXTENDED RELEASE ORAL 2 TIMES DAILY
Status: DISCONTINUED | OUTPATIENT
Start: 2018-08-14 | End: 2018-08-16

## 2018-08-14 RX ORDER — ONDANSETRON 4 MG/1
8 TABLET, FILM COATED ORAL
Status: CANCELLED | OUTPATIENT
Start: 2018-08-18

## 2018-08-14 RX ORDER — DEXAMETHASONE 4 MG/1
8 TABLET ORAL
Status: COMPLETED | OUTPATIENT
Start: 2018-08-18 | End: 2018-08-19

## 2018-08-14 RX ADMIN — ACYCLOVIR 400 MG: 200 CAPSULE ORAL at 08:08

## 2018-08-14 RX ADMIN — DEXAMETHASONE 1 DROP: 1 SUSPENSION OPHTHALMIC at 10:08

## 2018-08-14 RX ADMIN — MORPHINE SULFATE 30 MG: 30 TABLET, EXTENDED RELEASE ORAL at 08:08

## 2018-08-14 RX ADMIN — PROCHLORPERAZINE MALEATE 10 MG: 5 TABLET, FILM COATED ORAL at 08:08

## 2018-08-14 RX ADMIN — SODIUM CHLORIDE: 0.9 INJECTION, SOLUTION INTRAVENOUS at 08:08

## 2018-08-14 RX ADMIN — DEXAMETHASONE 8 MG: 4 TABLET ORAL at 08:08

## 2018-08-14 RX ADMIN — CYTARABINE 5040 MG: 100 INJECTION, SOLUTION INTRATHECAL; INTRAVENOUS; SUBCUTANEOUS at 09:08

## 2018-08-14 RX ADMIN — ONDANSETRON 8 MG: 4 TABLET, FILM COATED ORAL at 08:08

## 2018-08-14 NOTE — HOSPITAL COURSE
Patient was admitted HIDAC therapy.  She tolerated the chemotherapy well and was supportive with electrolyte repletion, transfusion, and anti emetics PRN.  On day 6 she was deemed stable for discharge.  Her hospitalization was complicated nausea and vomiting in addition to elevated LFTs that were attribtued to her chemotherapy.  She will be discharged with close follow up and next day labs.  She was instructed to hold her midostaurin until instructed to continue by her oncologist pending repeat LFTs.

## 2018-08-14 NOTE — ASSESSMENT & PLAN NOTE
- likely previous treatment  - has outpt appt with cards Dr. Agarwal on 8/17/17, will likely need to move appt date pending discharge

## 2018-08-14 NOTE — ASSESSMENT & PLAN NOTE
- does not itch or hurt  - derm performed skin bx  - this was negative for leukemia cutis and sweets syndrome

## 2018-08-14 NOTE — ASSESSMENT & PLAN NOTE
- Admitted from [facility] on 5/25/18 early AM with WBC around 100s, for suspected new non-M3 AML  - Lo res HLA typing on 5/26/18 and hi res on 5/27/18 done in anticipation of possible need for future transplant   - Pt with two daughters, two full sisters (in their mid 60s, one with brain aneurysm hx, other one without any medical issues), and one full brother (61 y/o healthy).  - NPM1 +, CEBPA (-), FLT3 (+).  On Midostaurin 50 mg PO BID until Day 14 (held starting 6/8 to 6/11). Stopped when FLAG JENNIFER re-induction started. Restarted Midostaurin at 25 mg bid on day 8 of FLAG JENNIFER induction (6/22), increased to full dose 50 mg bid (6/26) as improvement in liver enzymes. Stopped 7/3/18 due to abnormal cardiac echo.    - Received 7+3, day 14 bone marrow biopsy completed 6/11 showing persistent disease with 15% CD 34 positive blasts; was reinduced with FLAG-JENNIFER, day 14 restaging bone marrow biopsy done 6/29 without complication, results with no evidence of AML, FLT 3 negative  - Repeat marrow at count recovery 7/31 showed no evidence of AML  - Derm bx of right palm negative for leukemia cutis and sweets  - Admitted on 8/14/18 for planned cycle 1 hidac

## 2018-08-15 ENCOUNTER — TELEPHONE (OUTPATIENT)
Dept: HEMATOLOGY/ONCOLOGY | Facility: CLINIC | Age: 68
End: 2018-08-15

## 2018-08-15 LAB
ABO + RH BLD: NORMAL
ALBUMIN SERPL BCP-MCNC: 2.4 G/DL
ALP SERPL-CCNC: 303 U/L
ALT SERPL W/O P-5'-P-CCNC: 41 U/L
ANION GAP SERPL CALC-SCNC: 9 MMOL/L
AST SERPL-CCNC: 187 U/L
BASOPHILS # BLD AUTO: 0.01 K/UL
BASOPHILS NFR BLD: 0.2 %
BILIRUB SERPL-MCNC: 0.7 MG/DL
BLD GP AB SCN CELLS X3 SERPL QL: NORMAL
BUN SERPL-MCNC: 12 MG/DL
CALCIUM SERPL-MCNC: 8.8 MG/DL
CHLORIDE SERPL-SCNC: 109 MMOL/L
CO2 SERPL-SCNC: 21 MMOL/L
CREAT SERPL-MCNC: 0.7 MG/DL
DIFFERENTIAL METHOD: ABNORMAL
EOSINOPHIL # BLD AUTO: 0 K/UL
EOSINOPHIL NFR BLD: 0.2 %
ERYTHROCYTE [DISTWIDTH] IN BLOOD BY AUTOMATED COUNT: 18.6 %
EST. GFR  (AFRICAN AMERICAN): >60 ML/MIN/1.73 M^2
EST. GFR  (NON AFRICAN AMERICAN): >60 ML/MIN/1.73 M^2
ESTIMATED PA SYSTOLIC PRESSURE: 25.28
GLUCOSE SERPL-MCNC: 142 MG/DL
HCT VFR BLD AUTO: 23.4 %
HGB BLD-MCNC: 7.4 G/DL
IMM GRANULOCYTES # BLD AUTO: 0.21 K/UL
IMM GRANULOCYTES NFR BLD AUTO: 4.3 %
LYMPHOCYTES # BLD AUTO: 0.8 K/UL
LYMPHOCYTES NFR BLD: 17 %
MAGNESIUM SERPL-MCNC: 1.5 MG/DL
MCH RBC QN AUTO: 30 PG
MCHC RBC AUTO-ENTMCNC: 31.6 G/DL
MCV RBC AUTO: 95 FL
MITRAL VALVE MOBILITY: NORMAL
MONOCYTES # BLD AUTO: 0.2 K/UL
MONOCYTES NFR BLD: 3.2 %
NEUTROPHILS # BLD AUTO: 3.7 K/UL
NEUTROPHILS NFR BLD: 75.1 %
NRBC BLD-RTO: 0 /100 WBC
PHOSPHATE SERPL-MCNC: 4.4 MG/DL
PLATELET # BLD AUTO: 203 K/UL
PMV BLD AUTO: 10.7 FL
POTASSIUM SERPL-SCNC: 4.6 MMOL/L
PROT SERPL-MCNC: 5.3 G/DL
RBC # BLD AUTO: 2.47 M/UL
RETIRED EF AND QEF - SEE NOTES: 60 (ref 55–65)
SODIUM SERPL-SCNC: 139 MMOL/L
WBC # BLD AUTO: 4.93 K/UL

## 2018-08-15 PROCEDURE — 83735 ASSAY OF MAGNESIUM: CPT

## 2018-08-15 PROCEDURE — 63600175 PHARM REV CODE 636 W HCPCS: Performed by: INTERNAL MEDICINE

## 2018-08-15 PROCEDURE — 63600175 PHARM REV CODE 636 W HCPCS: Performed by: NURSE PRACTITIONER

## 2018-08-15 PROCEDURE — 20600001 HC STEP DOWN PRIVATE ROOM

## 2018-08-15 PROCEDURE — 93005 ELECTROCARDIOGRAM TRACING: CPT

## 2018-08-15 PROCEDURE — 80053 COMPREHEN METABOLIC PANEL: CPT

## 2018-08-15 PROCEDURE — 93010 ELECTROCARDIOGRAM REPORT: CPT | Mod: ,,, | Performed by: INTERNAL MEDICINE

## 2018-08-15 PROCEDURE — 84100 ASSAY OF PHOSPHORUS: CPT

## 2018-08-15 PROCEDURE — 86850 RBC ANTIBODY SCREEN: CPT

## 2018-08-15 PROCEDURE — 25000003 PHARM REV CODE 250: Performed by: INTERNAL MEDICINE

## 2018-08-15 PROCEDURE — 93306 TTE W/DOPPLER COMPLETE: CPT

## 2018-08-15 PROCEDURE — 86920 COMPATIBILITY TEST SPIN: CPT

## 2018-08-15 PROCEDURE — 85025 COMPLETE CBC W/AUTO DIFF WBC: CPT

## 2018-08-15 PROCEDURE — 93306 TTE W/DOPPLER COMPLETE: CPT | Mod: 26,,, | Performed by: INTERNAL MEDICINE

## 2018-08-15 PROCEDURE — 25000003 PHARM REV CODE 250: Performed by: NURSE PRACTITIONER

## 2018-08-15 PROCEDURE — 36415 COLL VENOUS BLD VENIPUNCTURE: CPT

## 2018-08-15 PROCEDURE — 99223 1ST HOSP IP/OBS HIGH 75: CPT | Mod: ,,, | Performed by: INTERNAL MEDICINE

## 2018-08-15 RX ORDER — MAGNESIUM SULFATE HEPTAHYDRATE 40 MG/ML
4 INJECTION, SOLUTION INTRAVENOUS ONCE
Status: COMPLETED | OUTPATIENT
Start: 2018-08-15 | End: 2018-08-15

## 2018-08-15 RX ADMIN — PANTOPRAZOLE SODIUM 40 MG: 40 TABLET, DELAYED RELEASE ORAL at 08:08

## 2018-08-15 RX ADMIN — ONDANSETRON 8 MG: 4 TABLET, FILM COATED ORAL at 08:08

## 2018-08-15 RX ADMIN — DEXAMETHASONE 8 MG: 4 TABLET ORAL at 08:08

## 2018-08-15 RX ADMIN — ACYCLOVIR 400 MG: 200 CAPSULE ORAL at 08:08

## 2018-08-15 RX ADMIN — MAGNESIUM SULFATE IN WATER 4 G: 40 INJECTION, SOLUTION INTRAVENOUS at 09:08

## 2018-08-15 RX ADMIN — DEXAMETHASONE 1 DROP: 1 SUSPENSION OPHTHALMIC at 05:08

## 2018-08-15 RX ADMIN — DEXAMETHASONE 1 DROP: 1 SUSPENSION OPHTHALMIC at 11:08

## 2018-08-15 RX ADMIN — SODIUM CHLORIDE: 0.9 INJECTION, SOLUTION INTRAVENOUS at 11:08

## 2018-08-15 RX ADMIN — DEXAMETHASONE 1 DROP: 1 SUSPENSION OPHTHALMIC at 06:08

## 2018-08-15 RX ADMIN — CYTARABINE 5040 MG: 100 INJECTION, SOLUTION INTRATHECAL; INTRAVENOUS; SUBCUTANEOUS at 09:08

## 2018-08-15 RX ADMIN — ENOXAPARIN SODIUM 40 MG: 100 INJECTION SUBCUTANEOUS at 05:08

## 2018-08-15 RX ADMIN — ESCITALOPRAM 10 MG: 5 TABLET, FILM COATED ORAL at 08:08

## 2018-08-15 RX ADMIN — DEXAMETHASONE 1 DROP: 1 SUSPENSION OPHTHALMIC at 12:08

## 2018-08-15 NOTE — SUBJECTIVE & OBJECTIVE
Patient information was obtained from patient and past medical records.     Oncology History: please see above.       Medications Prior to Admission   Medication Sig Dispense Refill Last Dose    acyclovir (ZOVIRAX) 200 MG capsule Take 2 capsules (400 mg total) by mouth 2 (two) times daily. 120 capsule 11 Taking    ALPRAZolam (XANAX) 0.25 MG tablet Take 1 tablet (0.25 mg total) by mouth 3 (three) times daily as needed for Anxiety. 40 tablet 1 Taking    cetirizine (ZYRTEC) 10 MG tablet Take 1 tablet (10 mg total) by mouth once daily for 10 days 10 tablet 0 Taking    ciprofloxacin HCl (CIPRO) 500 MG tablet Take 1 tablet (500 mg total) by mouth every 12 (twelve) hours. 60 tablet 3 Not Taking    escitalopram oxalate (LEXAPRO) 10 MG tablet Take 10 mg by mouth once daily.   Taking    fluconazole (DIFLUCAN) 200 MG Tab Take 2 tablets (400 mg total) by mouth once daily. 60 tablet 3 Taking    hydrocortisone 2.5 % cream Apply topically 2 (two) times daily. Apply to affected area on palms for 10 days 28 g 3     LORazepam (ATIVAN) 0.5 MG tablet Take 1 tablet (0.5 mg total) by mouth every 6 (six) hours as needed (nausea). 60 tablet 0 Taking    magnesium oxide (MAG-OX) 400 mg tablet Take 2 tablets (800 mg total) by mouth 2 (two) times daily. 120 tablet 1 Taking    metoprolol succinate (TOPROL-XL) 25 MG 24 hr tablet Take 1 tablet (25 mg total) by mouth once daily. 30 tablet 3 Taking    morphine (MS CONTIN) 15 MG 12 hr tablet Take 2 tablets (30 mg total) by mouth 2 (two) times daily. 60 tablet 0 Taking    morphine (MSIR) 15 MG tablet Take 1 tablet (15 mg total) by mouth every 4 (four) hours as needed. 40 tablet 0 Taking    omeprazole (PRILOSEC OTC) 20 MG tablet Take 20 mg by mouth every morning.   Taking    ondansetron (ZOFRAN) 8 MG tablet Take 1 tablet (8 mg total) by mouth every 12 (twelve) hours as needed for Nausea. 30 tablet 2 Taking    prochlorperazine (COMPAZINE) 10 MG tablet Take 1 tablet (10 mg total) by mouth  4 (four) times daily. 60 tablet 3 Taking    vitamin D 1000 units Tab Take 1 tablet (1,000 Units total) by mouth once daily. 30 tablet 2 Taking       Methotrexate analogues and Bactrim [sulfamethoxazole-trimethoprim]     Past Medical History:   Diagnosis Date    Hypertension      Past Surgical History:   Procedure Laterality Date    CHOLECYSTECTOMY      HYSTERECTOMY      TONSILLECTOMY       Family History     Problem Relation (Age of Onset)    Cancer Mother, Father        Tobacco Use    Smoking status: Former Smoker     Packs/day: 0.50     Years: 10.00     Pack years: 5.00     Types: Cigarettes     Last attempt to quit: 3/25/2017     Years since quittin.3   Substance and Sexual Activity    Alcohol use: Yes     Alcohol/week: 0.6 oz     Types: 1 Shots of liquor per week    Drug use: No    Sexual activity: Yes     Partners: Female       Review of Systems   Constitutional: Positive for appetite change and fatigue.   Respiratory: Negative for cough and wheezing.    Cardiovascular: Negative for chest pain and palpitations.   Gastrointestinal: Positive for diarrhea and nausea. Negative for abdominal distention, abdominal pain, constipation and vomiting.   Genitourinary: Negative for dysuria and frequency.   Musculoskeletal: Positive for back pain. Negative for arthralgias and myalgias.   Skin: Negative for pallor and rash.   Neurological: Negative for weakness, light-headedness and headaches.   Psychiatric/Behavioral: Negative for behavioral problems and confusion.     Objective:     Vital Signs (Most Recent):  Temp: 98 °F (36.7 °C) (08/15/18 0002)  Pulse: 85 (08/15/18 0002)  Resp: 18 (08/15/18 0002)  BP: (!) 104/53 (08/15/18 0002)  SpO2: 97 % (08/15/18 0002) Vital Signs (24h Range):  Temp:  [98 °F (36.7 °C)-99.2 °F (37.3 °C)] 98 °F (36.7 °C)  Pulse:  [] 85  Resp:  [16-18] 18  SpO2:  [96 %-100 %] 97 %  BP: (102-105)/(51-53) 104/53     Weight: 64.8 kg (142 lb 13.7 oz)  Body mass index is 26.13 kg/m².  Body  surface area is 1.68 meters squared.    ECOG SCORE         [unfilled]    Lines/Drains/Airways     Peripheral Intravenous Line                 Peripheral IV - Single Lumen 07/25/18 0625 Right Wrist 20 days         Peripheral IV - Single Lumen 08/14/18 1848 Left Forearm less than 1 day                Physical Exam   Constitutional: She is oriented to person, place, and time. She appears well-developed and well-nourished. No distress.   Patient resting comfortably.     HENT:   Head: Normocephalic and atraumatic.   Mouth/Throat: No oropharyngeal exudate.   Eyes: Pupils are equal, round, and reactive to light.   Neck: Normal range of motion.   Cardiovascular: Normal rate, regular rhythm and normal heart sounds.   No murmur heard.  Pulmonary/Chest: Effort normal and breath sounds normal. No stridor. No respiratory distress.   Abdominal: Soft. Bowel sounds are normal. She exhibits no distension.   Musculoskeletal: Normal range of motion. She exhibits no edema.   Neurological: She is alert and oriented to person, place, and time. No cranial nerve deficit.   Skin: Skin is warm and dry. She is not diaphoretic. No erythema.   Psychiatric: She has a normal mood and affect. Her behavior is normal.   Vitals reviewed.      Significant Labs:   Recent Results (from the past 24 hour(s))   Comprehensive metabolic panel    Collection Time: 08/14/18  1:23 PM   Result Value Ref Range    Sodium 136 136 - 145 mmol/L    Potassium 3.8 3.5 - 5.1 mmol/L    Chloride 104 95 - 110 mmol/L    CO2 22 (L) 23 - 29 mmol/L    Glucose 103 70 - 110 mg/dL    BUN, Bld 9 8 - 23 mg/dL    Creatinine 0.7 0.5 - 1.4 mg/dL    Calcium 8.8 8.7 - 10.5 mg/dL    Total Protein 5.9 (L) 6.0 - 8.4 g/dL    Albumin 2.6 (L) 3.5 - 5.2 g/dL    Total Bilirubin 0.4 0.1 - 1.0 mg/dL    Alkaline Phosphatase 169 (H) 55 - 135 U/L    AST 20 10 - 40 U/L    ALT 10 10 - 44 U/L    Anion Gap 10 8 - 16 mmol/L    eGFR if African American >60.0 >60 mL/min/1.73 m^2    eGFR if non African American  >60.0 >60 mL/min/1.73 m^2   Magnesium    Collection Time: 08/14/18  1:23 PM   Result Value Ref Range    Magnesium 1.4 (L) 1.6 - 2.6 mg/dL   Phosphorus    Collection Time: 08/14/18  1:23 PM   Result Value Ref Range    Phosphorus 3.2 2.7 - 4.5 mg/dL   CBC W/ AUTO DIFFERENTIAL    Collection Time: 08/14/18  1:23 PM   Result Value Ref Range    WBC 5.82 3.90 - 12.70 K/uL    RBC 2.80 (L) 4.00 - 5.40 M/uL    Hemoglobin 8.4 (L) 12.0 - 16.0 g/dL    Hematocrit 26.0 (L) 37.0 - 48.5 %    MCV 93 82 - 98 fL    MCH 30.0 27.0 - 31.0 pg    MCHC 32.3 32.0 - 36.0 g/dL    RDW 18.5 (H) 11.5 - 14.5 %    Platelets 219 150 - 350 K/uL    MPV 10.2 9.2 - 12.9 fL    Immature Granulocytes 3.8 (H) 0.0 - 0.5 %    Gran # (ANC) 3.5 1.8 - 7.7 K/uL    Immature Grans (Abs) 0.22 (H) 0.00 - 0.04 K/uL    Lymph # 1.4 1.0 - 4.8 K/uL    Mono # 0.6 0.3 - 1.0 K/uL    Eos # 0.1 0.0 - 0.5 K/uL    Baso # 0.02 0.00 - 0.20 K/uL    nRBC 0 0 /100 WBC    Gran% 60.2 38.0 - 73.0 %    Lymph% 23.5 18.0 - 48.0 %    Mono% 10.3 4.0 - 15.0 %    Eosinophil% 1.9 0.0 - 8.0 %    Basophil% 0.3 0.0 - 1.9 %    Differential Method Automated          Diagnostic Results:  None

## 2018-08-15 NOTE — SUBJECTIVE & OBJECTIVE
Subjective:     Interval History: Pt is a 67 yo F with h/o AML FLT3+, NPM1+ s/p therapy with 7+3 with residual disease and then treatment with FLAG Maribel with GABRIEL on day 14 BM who presents to the hospital for cycle 1 of [facility].  The patient received therapy at 2128 last night. Pt with no acute events overnight.  Pt denies fever, chills, CP, SOB, cough, N/V, constipation, diarrhea    Review of Systems   Constitutional: Negative for chills and fever.   HENT: Negative for congestion.    Respiratory: Negative for cough, sputum production and shortness of breath.    Cardiovascular: Negative for chest pain and leg swelling.   Gastrointestinal: Negative for abdominal pain, constipation, diarrhea, nausea and vomiting.   Neurological: Negative for headaches.       Objective:     Vital Signs (Most Recent):  Temp: 97.9 °F (36.6 °C) (08/15/18 0402)  Pulse: 84 (08/15/18 0402)  Resp: 18 (08/15/18 0402)  BP: (!) 103/54 (08/15/18 0402)  SpO2: (!) 94 % (08/15/18 0402) Vital Signs (24h Range):  Temp:  [97.9 °F (36.6 °C)-99.2 °F (37.3 °C)] 97.9 °F (36.6 °C)  Pulse:  [] 84  Resp:  [16-18] 18  SpO2:  [94 %-100 %] 94 %  BP: (102-105)/(51-54) 103/54     Weight: 64.5 kg (142 lb 3.2 oz)  Body mass index is 26.01 kg/m².  Body surface area is 1.68 meters squared.    ECOG SCORE         [unfilled]    Intake/Output - Last 3 Shifts     ** Patient Encounter Information Not Found **          Physical Exam   Constitutional: She is oriented to person, place, and time. She appears well-developed and well-nourished.   HENT:   Alopecia   Eyes: EOM are normal. Right eye exhibits no discharge. Left eye exhibits no discharge.   Cardiovascular: Normal rate, regular rhythm and normal heart sounds. Exam reveals no gallop and no friction rub.   No murmur heard.  Pulmonary/Chest: Effort normal and breath sounds normal. No respiratory distress. She has no wheezes. She has no rales.   Abdominal: Soft. Bowel sounds are normal. She exhibits no distension. There  is no tenderness. There is no rebound and no guarding.   Neurological: She is alert and oriented to person, place, and time.       Significant Labs:   Recent Results (from the past 24 hour(s))   Comprehensive metabolic panel    Collection Time: 08/14/18  1:23 PM   Result Value Ref Range    Sodium 136 136 - 145 mmol/L    Potassium 3.8 3.5 - 5.1 mmol/L    Chloride 104 95 - 110 mmol/L    CO2 22 (L) 23 - 29 mmol/L    Glucose 103 70 - 110 mg/dL    BUN, Bld 9 8 - 23 mg/dL    Creatinine 0.7 0.5 - 1.4 mg/dL    Calcium 8.8 8.7 - 10.5 mg/dL    Total Protein 5.9 (L) 6.0 - 8.4 g/dL    Albumin 2.6 (L) 3.5 - 5.2 g/dL    Total Bilirubin 0.4 0.1 - 1.0 mg/dL    Alkaline Phosphatase 169 (H) 55 - 135 U/L    AST 20 10 - 40 U/L    ALT 10 10 - 44 U/L    Anion Gap 10 8 - 16 mmol/L    eGFR if African American >60.0 >60 mL/min/1.73 m^2    eGFR if non African American >60.0 >60 mL/min/1.73 m^2   Magnesium    Collection Time: 08/14/18  1:23 PM   Result Value Ref Range    Magnesium 1.4 (L) 1.6 - 2.6 mg/dL   Phosphorus    Collection Time: 08/14/18  1:23 PM   Result Value Ref Range    Phosphorus 3.2 2.7 - 4.5 mg/dL   CBC W/ AUTO DIFFERENTIAL    Collection Time: 08/14/18  1:23 PM   Result Value Ref Range    WBC 5.82 3.90 - 12.70 K/uL    RBC 2.80 (L) 4.00 - 5.40 M/uL    Hemoglobin 8.4 (L) 12.0 - 16.0 g/dL    Hematocrit 26.0 (L) 37.0 - 48.5 %    MCV 93 82 - 98 fL    MCH 30.0 27.0 - 31.0 pg    MCHC 32.3 32.0 - 36.0 g/dL    RDW 18.5 (H) 11.5 - 14.5 %    Platelets 219 150 - 350 K/uL    MPV 10.2 9.2 - 12.9 fL    Immature Granulocytes 3.8 (H) 0.0 - 0.5 %    Gran # (ANC) 3.5 1.8 - 7.7 K/uL    Immature Grans (Abs) 0.22 (H) 0.00 - 0.04 K/uL    Lymph # 1.4 1.0 - 4.8 K/uL    Mono # 0.6 0.3 - 1.0 K/uL    Eos # 0.1 0.0 - 0.5 K/uL    Baso # 0.02 0.00 - 0.20 K/uL    nRBC 0 0 /100 WBC    Gran% 60.2 38.0 - 73.0 %    Lymph% 23.5 18.0 - 48.0 %    Mono% 10.3 4.0 - 15.0 %    Eosinophil% 1.9 0.0 - 8.0 %    Basophil% 0.3 0.0 - 1.9 %    Differential Method Automated     Comprehensive Metabolic Panel (CMP)    Collection Time: 08/15/18  4:27 AM   Result Value Ref Range    Sodium 139 136 - 145 mmol/L    Potassium 4.6 3.5 - 5.1 mmol/L    Chloride 109 95 - 110 mmol/L    CO2 21 (L) 23 - 29 mmol/L    Glucose 142 (H) 70 - 110 mg/dL    BUN, Bld 12 8 - 23 mg/dL    Creatinine 0.7 0.5 - 1.4 mg/dL    Calcium 8.8 8.7 - 10.5 mg/dL    Total Protein 5.3 (L) 6.0 - 8.4 g/dL    Albumin 2.4 (L) 3.5 - 5.2 g/dL    Total Bilirubin 0.7 0.1 - 1.0 mg/dL    Alkaline Phosphatase 303 (H) 55 - 135 U/L     (H) 10 - 40 U/L    ALT 41 10 - 44 U/L    Anion Gap 9 8 - 16 mmol/L    eGFR if African American >60.0 >60 mL/min/1.73 m^2    eGFR if non African American >60.0 >60 mL/min/1.73 m^2   Magnesium    Collection Time: 08/15/18  4:27 AM   Result Value Ref Range    Magnesium 1.5 (L) 1.6 - 2.6 mg/dL   Phosphorus    Collection Time: 08/15/18  4:27 AM   Result Value Ref Range    Phosphorus 4.4 2.7 - 4.5 mg/dL   CBC with Automated Differential    Collection Time: 08/15/18  4:27 AM   Result Value Ref Range    WBC 4.93 3.90 - 12.70 K/uL    RBC 2.47 (L) 4.00 - 5.40 M/uL    Hemoglobin 7.4 (L) 12.0 - 16.0 g/dL    Hematocrit 23.4 (L) 37.0 - 48.5 %    MCV 95 82 - 98 fL    MCH 30.0 27.0 - 31.0 pg    MCHC 31.6 (L) 32.0 - 36.0 g/dL    RDW 18.6 (H) 11.5 - 14.5 %    Platelets 203 150 - 350 K/uL    MPV 10.7 9.2 - 12.9 fL    Immature Granulocytes 4.3 (H) 0.0 - 0.5 %    Gran # (ANC) 3.7 1.8 - 7.7 K/uL    Immature Grans (Abs) 0.21 (H) 0.00 - 0.04 K/uL    Lymph # 0.8 (L) 1.0 - 4.8 K/uL    Mono # 0.2 (L) 0.3 - 1.0 K/uL    Eos # 0.0 0.0 - 0.5 K/uL    Baso # 0.01 0.00 - 0.20 K/uL    nRBC 0 0 /100 WBC    Gran% 75.1 (H) 38.0 - 73.0 %    Lymph% 17.0 (L) 18.0 - 48.0 %    Mono% 3.2 (L) 4.0 - 15.0 %    Eosinophil% 0.2 0.0 - 8.0 %    Basophil% 0.2 0.0 - 1.9 %    Differential Method Automated    Type & Screen    Collection Time: 08/15/18  4:27 AM   Result Value Ref Range    Group & Rh A POS     Indirect Nitin NEG        Diagnostic  Results:  I have reviewed all pertinent imaging results/findings within the past 24 hours.

## 2018-08-15 NOTE — PROGRESS NOTES
Admit Assessment    Patient Identification  Noreen Mac   :  1950  Admit Date:  2018  Attending Provider:  Main Tong MD              Referral:   Pt was admitted to  with a diagnosis of Acute monocytic leukemia in remission, and was admitted this hospital stay due to AML (acute myeloblastic leukemia) [C92.00].   is involved was referred to the Social Work Department via routine referral.  Patient presents as a 68 y.o. year old  female.    Persons interviewed: patient     Living Situation:  Pt. States that she resides at home with her spouse ([phone] [phone]). She states that she has been fairly independent with all adl's prior to admission but that her  is able to assist with needs if necessary.      Resides at [address] [facility] [place] [address] [place] [address], phone: [phone] (home).      Functional Status Prior  Ambulation: 0-->independent(slowly)  Transferrin-->independent  Toiletin-->independent    Current or Past Agencies and Description of Services/Supplies    DME  Agency Name: none  Agency Phone Number: n/a       Home Health  Agency Name: none  Agency Phone Number: n/a  Services: n/a    IV Infusion  Agency Name: none  Agency Phone Number: n/a    Nutrition: oral    Outpatient Pharmacy:     [facility] [address] [address]  Phone: [phone] Fax: [phone]    [facility] [place] - [address]   [address] [facility] [place] [address]  Phone: [phone] Fax: [phone]      Patient Preference of agencies include: none noted    Patient/Caregiver informed of right to choose providers or agencies.  Patient provides permission to release any necessary information to [facility] and to Non-[facility] agencies as needed to facilitate patient care, treatment planning, and patient discharge planning.  Written and verbal resources provided.      Coping: pt. States that she is  doing OK. She reports that her  is very supportive and involved in her care.           Adjustment to Diagnosis and Treatment: appropriate      Emotional/Behavioral/Cognitive Issues: none noted            History/Current Symptoms of Anxiety/Depression: No:   History/Current Substance Use:   Social History     Tobacco Use    Smoking status: Former Smoker     Packs/day: 0.50     Years: 10.00     Pack years: 5.00     Types: Cigarettes     Last attempt to quit: 3/25/2017     Years since quittin.3   Substance and Sexual Activity    Alcohol use: Yes     Alcohol/week: 0.6 oz     Types: 1 Shots of liquor per week    Drug use: No    Sexual activity: Yes     Partners: Female       Indications of Abuse/Neglect: No:   Abuse Screen  Do You Feel Unsafe at Home, Work or School?: no    Financial:  Payor/Plan Subscr  Sex Relation Sub. Ins. ID Effective Group Num   1. [place]ARE - [place]* ITZEL DOE 1950 Female  [id number]                                    [address]   2. [employer] - MEDIC* ITZEL DOE 1950 Female  [id number]                                    [address] [address]                            Other identified concerns/needs: none noted    Plan: to return home with her spouse who will be the primary caregiver.     Interventions/Referrals: TBD  Patient/caregiver engaged in treatment planning process.     providing psychosocial and supportive counseling, resources, education, assistance and discharge planning as appropriate.  Patient/caregiver state understanding of  available resources,  following, remains available. Provided pt. With sw'er phone # and encouraged her to call if needed. Will follow.

## 2018-08-15 NOTE — PLAN OF CARE
Problem: Patient Care Overview  Goal: Plan of Care Review  Outcome: Ongoing (interventions implemented as appropriate)  Pt AAOx4, independent. Afebrile. VSS at this time. IVF continued, Cytarabine administered as ordered. UO adequate, no BM this shift. No skin issues noted. No complaints of pain. Tolerating diet, no complaints N/V. Pt turns and repositions self, remains free from falls. Pt with nonskid footwear on, bed in lowest position and locked with bed rails up x2. Call light within reach. See flow sheet for further assessment. Will continue to monitor.

## 2018-08-15 NOTE — NURSING
Chemotherapy orders checked, CAR in chart with consent. PIV site assessed and blood return noted before chemo administration. Premedication and hydration given as ordered. Cytarabine checked by 2 chemotherapy certified nurses, and administered per hospital protocol as ordered. Infusion to complete over 3 hours. Patient stable, will continue to monitor.

## 2018-08-15 NOTE — H&P
[facility]  Hematology  Bone Marrow Transplant  H&P    Subjective:     Principal Problem: Acute monocytic leukemia in remission    HPI: Noreen Mac is a 68 y.o. lady with AML, nausea and vomiting 2/2 chemotherapy, chemotherapy induced cardiomyopathy, mood disorder, and essential hypertension who presents to [facility] for treatment of AML.  She is otherwise doing well without significant complaints at this time.      Last Oncology clinic note on 8/13/2018    Oncology History:   68 y.o. female admitted overnight for possible new AML. Came in from OSH with elevated WBC of 100.   05/25/2018: Pt is now on hydrea 2 grams BID, allopurinol 300 mg daily, and IVF. Pt may need rasburicase this weekend. She will undergo a BM bx and aspiration today. She is an induction candidate and will not be screened for research trials. She has anxiety for which she usually takes xanax, this was re-started.   05/26/2018 PICC placed. Reports she slept well last night. Anxiety improved with prn xanax. EF 65%, HIV and Hep panel negative. 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  6/12/2018: Day 15 of 7+3 induction, restaging BM bx done yesterday. On Midostaurin. Fever yesterday and BC with gram + cocci in clusters. On cefepime day 2 and will start Vanc today. Labial mucositis improving.   6/13/2018: Day 16 7+3. BC with staph aureus, identification pending. Surveillance blood cultures done today. Afebrile x 48 hours. On cefepime and Vanc. Labial mucositis resolved. No complaints of pain. Nausea controlled. No diarrhea. Primary complaint is fatigue.   6/14/20018: Day 17 of 7+3. Day 14 bone marrow results pending. BC with staph epidermis only sensitive to Vancomycin. Vanc day 3 and cefepime day 4. Vanc trough 12.2 last night. BP remains low but stable. Afebrile x 72 hours.   6/15/2018: Day 18 of 7+3. Day 14 bone marrow biopsy shows persistent disease with 15% blasts. Discussion with patient regarding treatment and she is  deciding if she wants to proceed with re-induction vs outpatient maintenance. Temp of 100.4 overnight, unsustained. Day 5 Cefepime and Day 4 of Vanc for staph epidermis. Repeat cultures NGTD. BP improved. Electrolytes (mag, phos, K and calcium) replaced aggressively this am and will repeat labs this afternoon. No complaints this am.   06/16/2018: Day 19 of 7+3. Day 2 of Flag-JENNIFER. Remains afebrile. Calcium remains low and have increased PO supplementation. Remains on vanc and aztreonam. Somewhat loopy feeling after receiving benadryl.   06/17/2018: Day 20 of 7+3. Day 3 of FLAG-JENNIFER. Multiple electrolyte abnormalities. New bilateral leg and feet swelling.   6/18/2018: Day 21 of 7+3 and Day 4 of FLAG JENNIFER. Tolerating well with some nausea controlled with Zofran. VSS, afebrile. ANC 1900 on Neupogen. Continues Vanc for staph epi bacteremia. Multiple electrolytes replaced today. Complains of edema to lower extremities, not improved after 20 of lasix yesterday. No sob or CP.   6/19/18: Day 22 of 7+3 and Day 5 of FLAG JENNIFER. VSS, afebrile, ANC 3900. Vanc trough elevated and dose adjusted this am. Lower extremity edema improved after 40 of lasix yesterday, net negative 300 cc I&O. Mild nausea, no abdominal pain or diarrhea. Poor appetite but no difficulty eating or taking pills.   6/20/18: Day 23 of 7+3 and Day 6 of FLAG JENNIFER. Patient complains of nausea and vomiting overnight and this am, especially when taking pills. Will add Zyprexa daily in addition to Zofran, compazine, and ativan PRN and will change meds to IV. 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 Afebrile.  No diarrhea, mouth sores or pain.  06/21/2018: Day 24 of 7+3 and Day 7 of FLAG JENNIFER. Nausea better controlled. Continued on Vanc.  6/22/2018: Day 25 of 7+3 and Day 8 of FLAG JENNIFER. Nausea improved some with Zyprexa but did have 1 episode of emesis overnight.continuing meds IV due to vomiting.  Remains afebrile. ANC 0. Continues Vanc and Flagyl. Electrolytes replaced.   06/23/2018 : day 26 of 7+3 and Day 9 of FLAG JENNIFER.  Nausea persists, worsened after taking pills so meds converted to IV.  Encouraged ambulation.  Continue with flagyl for leptotrichia goodfellowii.  RUQ US showed intrahepatic dilation, overall impression hepatic steatosis.  CBD 0.5cm.  No Gallbladder.    06/24/2018 : day 27 of 7+3 and Day 10 of FLAG JENNIFER.  Nausea persisting, able to tolerate some po pills.  Last day of flagyl (day 5).  Still pancytopenic. Appetite still low as po intake triggers nausea.  06/25/2018: day 28 of 7+3 and Day 11 of FLAG JENNIFER. Afebrile, ANC 0. Has completed Flagyl. Will decrease Vanc to 1000 mg q12, trough 19.9, will complete Vanc on 6/27. Liver enzymes stable on Midostaurin. VSS.   6/26/2018: day 29 of 7+3 and day 12 of FLAG JENNIFER. Liver enzymes improved and Midostaurin increased to full dose 50 mg bid. Nausea controlled, afebrile, VSS, NEON.  6/27/2018: day 30 of 7+3 and Day 13 of FLAG JENNIFER. Persistent nausea and emesis x 1 yesterday. Compazine changed to scheduled. Vanc ends today. Afebrile, VSS. Aggressive electrolyte replacement, low electrolytes possibly due to Midostaurin.   6/28/2018: day 31 of 7+3 and Day 14 of FLAG JENNIFER. Nausea improved with scheduled Compazine. Patient has agreed to start converting some IV meds to po. VSS, afebrile, electrolytes wnl today. Only complains of fatigue, new rash noted to upper back and chest and legs, papular rash mildly red.  6/29/2018: Day 32 of 7+3 and Day 15 of FLAG JENNIFER. Day 14 restaging bone marrow biopsy done at bedside today. Patient states nausea improved but she did have emesis last night after taking 9 pm pills. Rash improved. No mouth sores, diarrhea or pain.   7/2/2018: Day 35 of 7+3 and Day 18 of FLAG JENNIFER. Restaging BM bx results pending. Fluid overload over the weekend. Chest x-ray with pulmonary edema. Os sats down to 88%. Placed on O2 at 2 L NC, off O2 this am.  Received lasix. Net negative 2.7 L today. 1 episode of nausea, no emesis. Reports anxiety relieved with PRN meds. Electrolytes improved, afebrile, VSS.   7/3/2018: Day 36 of 7+3 and Day 19 of FLAG JENNIFER. Restaging Bm bx with GABRIEL. Cardiology consulted for abnormal echo, EF 30% with pulmonary HTN and severe L atrial enlargement. EKG with T wave abnormality, possible anterolateral ischemia and prolonged QTc of 530. Medications adjusted. Nausea controlled, no diarrhea. Electrolytes improved. On O2 as needed.   07/04/2018 : Day 37 of 7+3 and Day 20 of FLAG JENNIFER Diarrhea in AM, watery, no associated abdominal pain, nausea, or vomiting.    07/05/2018: Day 38 of 7+3 ane Day 21 of FLAG JENNIFER. No CP or SOB, cardiology following. On RA. All meds changed to po, denies nausea or vomiting and no diarrhea. VSS, afebrile.   7/6/2018: Day 22 of FLAG JENNIFER. Continues to tolerate po meds with no nausea. Afebrile. Awaiting count recovery for discharge.  7/7/2018-/8/2018: Day 40/41 of 7+3, Day 23/24 of FLAG JENNIFER.  Improving clinically.  Started on mag oxide per patient request.  Labs showing signs of ANC recovery.  7/9/2018: Day 25 FLAG JENNIFER, , continues Neupogen. Received platelets today. Afebrile, VSS. Tolerating all oral meds with no nausea or vomiting. Only complains of fatigue.   7/10/2018: Day 26 FLAG JENNIFER,  today. Had 2 episodes of vomiting and diarrhea last night. Feeling better. Afebrile, VSS.   7/11/2018: Day 27 FLAG JENNIFER,  today. No vomiting or diarrhea overnight and no nausea. Afebrile, VSS. Complains of back pain (bone pain) suspect due to count recovery. Relieved with oxy IR and Zyrtec started.  07/12/2018: Day 28 flag jennifer, engrafted today with ANC of 730. Back pain improved with zyrtec. Scheduled for IT chemo tomorrow at 1:30 pm and discharge home thereafter. Will likely need plt transfusion prior to IT chemo tomorrow   7/13/2018: Day 29 of FLAG JENNIFER. ANC up to 1300 today. Transfusing platelets today prior to LP  for IT chemo. Patient continues to complain of bone pain, mostly to back and legs. No nausea, diarrhea, sob. Afebrile and VSS.   --hospitalized 7/23-7/25 for C.diff diarrhea, neutropenic fever.  While inpatient she developed pink blisters on her palm that were concerning for relapse.     Interval History:   Ms. Mac was hospitalized for neutropenic fever since her last clinic visit. She feels better as the diarrhea has slowed garrett to 1-2 times a day. Began PO vanc on 7/25 and will complete a 2 week course. The rib pain has resolved. Lesions on her palms remains. They do no itch and are not painful.     Her overarching complaint is unrelenting nausea, not well controlled with zofran.        Patient information was obtained from patient and past medical records.     Oncology History: please see above.       Medications Prior to Admission   Medication Sig Dispense Refill Last Dose    acyclovir (ZOVIRAX) 200 MG capsule Take 2 capsules (400 mg total) by mouth 2 (two) times daily. 120 capsule 11 Taking    ALPRAZolam (XANAX) 0.25 MG tablet Take 1 tablet (0.25 mg total) by mouth 3 (three) times daily as needed for Anxiety. 40 tablet 1 Taking    cetirizine (ZYRTEC) 10 MG tablet Take 1 tablet (10 mg total) by mouth once daily for 10 days 10 tablet 0 Taking    ciprofloxacin HCl (CIPRO) 500 MG tablet Take 1 tablet (500 mg total) by mouth every 12 (twelve) hours. 60 tablet 3 Not Taking    escitalopram oxalate (LEXAPRO) 10 MG tablet Take 10 mg by mouth once daily.   Taking    fluconazole (DIFLUCAN) 200 MG Tab Take 2 tablets (400 mg total) by mouth once daily. 60 tablet 3 Taking    hydrocortisone 2.5 % cream Apply topically 2 (two) times daily. Apply to affected area on palms for 10 days 28 g 3     LORazepam (ATIVAN) 0.5 MG tablet Take 1 tablet (0.5 mg total) by mouth every 6 (six) hours as needed (nausea). 60 tablet 0 Taking    magnesium oxide (MAG-OX) 400 mg tablet Take 2 tablets (800 mg total) by mouth 2 (two) times  daily. 120 tablet 1 Taking    metoprolol succinate (TOPROL-XL) 25 MG 24 hr tablet Take 1 tablet (25 mg total) by mouth once daily. 30 tablet 3 Taking    morphine (MS CONTIN) 15 MG 12 hr tablet Take 2 tablets (30 mg total) by mouth 2 (two) times daily. 60 tablet 0 Taking    morphine (MSIR) 15 MG tablet Take 1 tablet (15 mg total) by mouth every 4 (four) hours as needed. 40 tablet 0 Taking    omeprazole (PRILOSEC OTC) 20 MG tablet Take 20 mg by mouth every morning.   Taking    ondansetron (ZOFRAN) 8 MG tablet Take 1 tablet (8 mg total) by mouth every 12 (twelve) hours as needed for Nausea. 30 tablet 2 Taking    prochlorperazine (COMPAZINE) 10 MG tablet Take 1 tablet (10 mg total) by mouth 4 (four) times daily. 60 tablet 3 Taking    vitamin D 1000 units Tab Take 1 tablet (1,000 Units total) by mouth once daily. 30 tablet 2 Taking       Methotrexate analogues and Bactrim [sulfamethoxazole-trimethoprim]     Past Medical History:   Diagnosis Date    Hypertension      Past Surgical History:   Procedure Laterality Date    CHOLECYSTECTOMY      HYSTERECTOMY      TONSILLECTOMY       Family History     Problem Relation (Age of Onset)    Cancer Mother, Father        Tobacco Use    Smoking status: Former Smoker     Packs/day: 0.50     Years: 10.00     Pack years: 5.00     Types: Cigarettes     Last attempt to quit: 3/25/2017     Years since quittin.3   Substance and Sexual Activity    Alcohol use: Yes     Alcohol/week: 0.6 oz     Types: 1 Shots of liquor per week    Drug use: No    Sexual activity: Yes     Partners: Female       Review of Systems   Constitutional: Positive for appetite change and fatigue.   Respiratory: Negative for cough and wheezing.    Cardiovascular: Negative for chest pain and palpitations.   Gastrointestinal: Positive for diarrhea and nausea. Negative for abdominal distention, abdominal pain, constipation and vomiting.   Genitourinary: Negative for dysuria and frequency.    Musculoskeletal: Positive for back pain. Negative for arthralgias and myalgias.   Skin: Negative for pallor and rash.   Neurological: Negative for weakness, light-headedness and headaches.   Psychiatric/Behavioral: Negative for behavioral problems and confusion.     Objective:     Vital Signs (Most Recent):  Temp: 98 °F (36.7 °C) (08/15/18 0002)  Pulse: 85 (08/15/18 0002)  Resp: 18 (08/15/18 0002)  BP: (!) 104/53 (08/15/18 0002)  SpO2: 97 % (08/15/18 0002) Vital Signs (24h Range):  Temp:  [98 °F (36.7 °C)-99.2 °F (37.3 °C)] 98 °F (36.7 °C)  Pulse:  [] 85  Resp:  [16-18] 18  SpO2:  [96 %-100 %] 97 %  BP: (102-105)/(51-53) 104/53     Weight: 64.8 kg (142 lb 13.7 oz)  Body mass index is 26.13 kg/m².  Body surface area is 1.68 meters squared.    ECOG SCORE         [unfilled]    Lines/Drains/Airways     Peripheral Intravenous Line                 Peripheral IV - Single Lumen 07/25/18 0625 Right Wrist 20 days         Peripheral IV - Single Lumen 08/14/18 1848 Left Forearm less than 1 day                Physical Exam   Constitutional: She is oriented to person, place, and time. She appears well-developed and well-nourished. No distress.   Patient resting comfortably.     HENT:   Head: Normocephalic and atraumatic.   Mouth/Throat: No oropharyngeal exudate.   Eyes: Pupils are equal, round, and reactive to light.   Neck: Normal range of motion.   Cardiovascular: Normal rate, regular rhythm and normal heart sounds.   No murmur heard.  Pulmonary/Chest: Effort normal and breath sounds normal. No stridor. No respiratory distress.   Abdominal: Soft. Bowel sounds are normal. She exhibits no distension.   Musculoskeletal: Normal range of motion. She exhibits no edema.   Neurological: She is alert and oriented to person, place, and time. No cranial nerve deficit.   Skin: Skin is warm and dry. She is not diaphoretic. No erythema.   Psychiatric: She has a normal mood and affect. Her behavior is normal.   Vitals  reviewed.      Significant Labs:   Recent Results (from the past 24 hour(s))   Comprehensive metabolic panel    Collection Time: 08/14/18  1:23 PM   Result Value Ref Range    Sodium 136 136 - 145 mmol/L    Potassium 3.8 3.5 - 5.1 mmol/L    Chloride 104 95 - 110 mmol/L    CO2 22 (L) 23 - 29 mmol/L    Glucose 103 70 - 110 mg/dL    BUN, Bld 9 8 - 23 mg/dL    Creatinine 0.7 0.5 - 1.4 mg/dL    Calcium 8.8 8.7 - 10.5 mg/dL    Total Protein 5.9 (L) 6.0 - 8.4 g/dL    Albumin 2.6 (L) 3.5 - 5.2 g/dL    Total Bilirubin 0.4 0.1 - 1.0 mg/dL    Alkaline Phosphatase 169 (H) 55 - 135 U/L    AST 20 10 - 40 U/L    ALT 10 10 - 44 U/L    Anion Gap 10 8 - 16 mmol/L    eGFR if African American >60.0 >60 mL/min/1.73 m^2    eGFR if non African American >60.0 >60 mL/min/1.73 m^2   Magnesium    Collection Time: 08/14/18  1:23 PM   Result Value Ref Range    Magnesium 1.4 (L) 1.6 - 2.6 mg/dL   Phosphorus    Collection Time: 08/14/18  1:23 PM   Result Value Ref Range    Phosphorus 3.2 2.7 - 4.5 mg/dL   CBC W/ AUTO DIFFERENTIAL    Collection Time: 08/14/18  1:23 PM   Result Value Ref Range    WBC 5.82 3.90 - 12.70 K/uL    RBC 2.80 (L) 4.00 - 5.40 M/uL    Hemoglobin 8.4 (L) 12.0 - 16.0 g/dL    Hematocrit 26.0 (L) 37.0 - 48.5 %    MCV 93 82 - 98 fL    MCH 30.0 27.0 - 31.0 pg    MCHC 32.3 32.0 - 36.0 g/dL    RDW 18.5 (H) 11.5 - 14.5 %    Platelets 219 150 - 350 K/uL    MPV 10.2 9.2 - 12.9 fL    Immature Granulocytes 3.8 (H) 0.0 - 0.5 %    Gran # (ANC) 3.5 1.8 - 7.7 K/uL    Immature Grans (Abs) 0.22 (H) 0.00 - 0.04 K/uL    Lymph # 1.4 1.0 - 4.8 K/uL    Mono # 0.6 0.3 - 1.0 K/uL    Eos # 0.1 0.0 - 0.5 K/uL    Baso # 0.02 0.00 - 0.20 K/uL    nRBC 0 0 /100 WBC    Gran% 60.2 38.0 - 73.0 %    Lymph% 23.5 18.0 - 48.0 %    Mono% 10.3 4.0 - 15.0 %    Eosinophil% 1.9 0.0 - 8.0 %    Basophil% 0.3 0.0 - 1.9 %    Differential Method Automated          Diagnostic Results:  None    Assessment/Plan:     * Acute monocytic leukemia in remission    - Admitted from  [facility] on 5/25/18 early AM with WBC around 100s, for suspected new non-M3 AML  - Lo res HLA typing on 5/26/18 and hi res on 5/27/18 done in anticipation of possible need for future transplant   - Pt with two daughters, two full sisters (in their mid 60s, one with brain aneurysm hx, other one without any medical issues), and one full brother (59 y/o healthy).  - NPM1 +, CEBPA (-), FLT3 (+).  On Midostaurin 50 mg PO BID until Day 14 (held starting 6/8 to 6/11). Stopped when FLAG JENNIFER re-induction started. Restarted Midostaurin at 25 mg bid on day 8 of FLAG JENNIFER induction (6/22), increased to full dose 50 mg bid (6/26) as improvement in liver enzymes. Stopped 7/3/18 due to abnormal cardiac echo.    - Received 7+3, day 14 bone marrow biopsy completed 6/11 showing persistent disease with 15% CD 34 positive blasts; was reinduced with FLAG-JENNIFER, day 14 restaging bone marrow biopsy done 6/29 without complication, results with no evidence of AML, FLT 3 negative  - Repeat marrow at count recovery 7/31 showed no evidence of AML  - Derm bx of right palm negative for leukemia cutis and sweets  - Admitted on 8/14/18 for planned cycle 1 hidac        Chemotherapy induced cardiomyopathy    - likely previous treatment  - has outpt appt with cards Dr. Agarwal on 8/17/17, will likely need to move appt date pending discharge         CINV (chemotherapy-induced nausea and vomiting)    - continue home zofran prn and compazine prn        Pancytopenia due to chemotherapy    - transfuse for hgb <7 and plt <10K        History of Clostridium difficile infection    - treated with PO vanc 7/25 for 2 week  - diarrhea improved        Rash of hands    - does not itch or hurt  - derm performed skin bx  - this was negative for leukemia cutis and sweets syndrome         HTN (hypertension)    - continue home tropol, will d/c if hypotensive        Depression    - continue home lexapro, xanax prn, and ativan prn        Electrolyte abnormality    -  replace per prn BMT electrolyte replacement protocol         Acute back pain    - continue home regimen: MS contin and MSIR prn            VTE Risk Mitigation (From admission, onward)        Ordered     enoxaparin injection 40 mg  Daily      08/14/18 1817     heparin, porcine (PF) 100 unit/mL injection flush 300 Units  As needed (PRN)      08/14/18 1820     IP VTE HIGH RISK PATIENT  Once      08/14/18 1817          Disposition: inpatient chemotherapy     Heriberto Reese MD  Bone Marrow Transplant  Hematology  [facility]

## 2018-08-15 NOTE — ASSESSMENT & PLAN NOTE
- Admitted from [facility] on 5/25/18 early AM with WBC around 100s, for suspected new non-M3 AML  - Lo res HLA typing on 5/26/18 and hi res on 5/27/18 done in anticipation of possible need for future transplant   - Pt with two daughters, two full sisters (in their mid 60s, one with brain aneurysm hx, other one without any medical issues), and one full brother (59 y/o healthy).  - NPM1 +, CEBPA (-), FLT3 (+).  On Midostaurin 50 mg PO BID until Day 14 (held starting 6/8 to 6/11). Stopped when FLAG JENNIFER re-induction started. Restarted Midostaurin at 25 mg bid on day 8 of FLAG JENNIFER induction (6/22), increased to full dose 50 mg bid (6/26) as improvement in liver enzymes. Stopped 7/3/18 due to abnormal cardiac echo.    - Received 7+3, day 14 bone marrow biopsy completed 6/11 showing persistent disease with 15% CD 34 positive blasts; was reinduced with FLAG-JENNIFER, day 14 restaging bone marrow biopsy done 6/29 without complication, results with no evidence of AML, FLT 3 negative  - Repeat marrow at count recovery 7/31 showed no evidence of AML  - Derm bx of right palm negative for leukemia cutis and sweets  - Admitted on 8/14/18 for planned cycle 1 hidac  -Pt received first dose at 2128 8/14/18.

## 2018-08-15 NOTE — PLAN OF CARE
Problem: Patient Care Overview  Goal: Plan of Care Review  Outcome: Ongoing (interventions implemented as appropriate)  Pt AAOx4; independent. Vital signs stable and pt remained afebrile throughout shift. Pt is progressing and involved in POC.  Pt remained free from falls during shift.  Bed lowest position and locked. Activity clustered for rest periods.  Call light in reach.  TM

## 2018-08-15 NOTE — PROGRESS NOTES
[facility]  Hematology  Bone Marrow Transplant  Progress Note    Patient Name: Noreen Mac  Admission Date: 8/14/2018  Hospital Length of Stay: 1 days  Code Status: Full Code    Subjective:     Interval History: Pt is a 69 yo F with h/o AML FLT3+, NPM1+ s/p therapy with 7+3 with residual disease and then treatment with FLAG Maribel with GABRIEL on day 14 BM who presents to the hospital for cycle 1 of HiDAC.  The patient received therapy at 2128 last night. Pt with no acute events overnight.  Pt denies fever, chills, CP, SOB, cough, N/V, constipation, diarrhea    Review of Systems   Constitutional: Negative for chills and fever.   HENT: Negative for congestion.    Respiratory: Negative for cough, sputum production and shortness of breath.    Cardiovascular: Negative for chest pain and leg swelling.   Gastrointestinal: Negative for abdominal pain, constipation, diarrhea, nausea and vomiting.   Neurological: Negative for headaches.       Objective:     Vital Signs (Most Recent):  Temp: 97.9 °F (36.6 °C) (08/15/18 0402)  Pulse: 84 (08/15/18 0402)  Resp: 18 (08/15/18 0402)  BP: (!) 103/54 (08/15/18 0402)  SpO2: (!) 94 % (08/15/18 0402) Vital Signs (24h Range):  Temp:  [97.9 °F (36.6 °C)-99.2 °F (37.3 °C)] 97.9 °F (36.6 °C)  Pulse:  [] 84  Resp:  [16-18] 18  SpO2:  [94 %-100 %] 94 %  BP: (102-105)/(51-54) 103/54     Weight: 64.5 kg (142 lb 3.2 oz)  Body mass index is 26.01 kg/m².  Body surface area is 1.68 meters squared.    ECOG SCORE         [unfilled]    Intake/Output - Last 3 Shifts     ** Patient Encounter Information Not Found **          Physical Exam   Constitutional: She is oriented to person, place, and time. She appears well-developed and well-nourished.   HENT:   Alopecia   Eyes: EOM are normal. Right eye exhibits no discharge. Left eye exhibits no discharge.   Cardiovascular: Normal rate, regular rhythm and normal heart sounds. Exam reveals no gallop and no friction rub.   No murmur  heard.  Pulmonary/Chest: Effort normal and breath sounds normal. No respiratory distress. She has no wheezes. She has no rales.   Abdominal: Soft. Bowel sounds are normal. She exhibits no distension. There is no tenderness. There is no rebound and no guarding.   Neurological: She is alert and oriented to person, place, and time.       Significant Labs:   Recent Results (from the past 24 hour(s))   Comprehensive metabolic panel    Collection Time: 08/14/18  1:23 PM   Result Value Ref Range    Sodium 136 136 - 145 mmol/L    Potassium 3.8 3.5 - 5.1 mmol/L    Chloride 104 95 - 110 mmol/L    CO2 22 (L) 23 - 29 mmol/L    Glucose 103 70 - 110 mg/dL    BUN, Bld 9 8 - 23 mg/dL    Creatinine 0.7 0.5 - 1.4 mg/dL    Calcium 8.8 8.7 - 10.5 mg/dL    Total Protein 5.9 (L) 6.0 - 8.4 g/dL    Albumin 2.6 (L) 3.5 - 5.2 g/dL    Total Bilirubin 0.4 0.1 - 1.0 mg/dL    Alkaline Phosphatase 169 (H) 55 - 135 U/L    AST 20 10 - 40 U/L    ALT 10 10 - 44 U/L    Anion Gap 10 8 - 16 mmol/L    eGFR if African American >60.0 >60 mL/min/1.73 m^2    eGFR if non African American >60.0 >60 mL/min/1.73 m^2   Magnesium    Collection Time: 08/14/18  1:23 PM   Result Value Ref Range    Magnesium 1.4 (L) 1.6 - 2.6 mg/dL   Phosphorus    Collection Time: 08/14/18  1:23 PM   Result Value Ref Range    Phosphorus 3.2 2.7 - 4.5 mg/dL   CBC W/ AUTO DIFFERENTIAL    Collection Time: 08/14/18  1:23 PM   Result Value Ref Range    WBC 5.82 3.90 - 12.70 K/uL    RBC 2.80 (L) 4.00 - 5.40 M/uL    Hemoglobin 8.4 (L) 12.0 - 16.0 g/dL    Hematocrit 26.0 (L) 37.0 - 48.5 %    MCV 93 82 - 98 fL    MCH 30.0 27.0 - 31.0 pg    MCHC 32.3 32.0 - 36.0 g/dL    RDW 18.5 (H) 11.5 - 14.5 %    Platelets 219 150 - 350 K/uL    MPV 10.2 9.2 - 12.9 fL    Immature Granulocytes 3.8 (H) 0.0 - 0.5 %    Gran # (ANC) 3.5 1.8 - 7.7 K/uL    Immature Grans (Abs) 0.22 (H) 0.00 - 0.04 K/uL    Lymph # 1.4 1.0 - 4.8 K/uL    Mono # 0.6 0.3 - 1.0 K/uL    Eos # 0.1 0.0 - 0.5 K/uL    Baso # 0.02 0.00 - 0.20  K/uL    nRBC 0 0 /100 WBC    Gran% 60.2 38.0 - 73.0 %    Lymph% 23.5 18.0 - 48.0 %    Mono% 10.3 4.0 - 15.0 %    Eosinophil% 1.9 0.0 - 8.0 %    Basophil% 0.3 0.0 - 1.9 %    Differential Method Automated    Comprehensive Metabolic Panel (CMP)    Collection Time: 08/15/18  4:27 AM   Result Value Ref Range    Sodium 139 136 - 145 mmol/L    Potassium 4.6 3.5 - 5.1 mmol/L    Chloride 109 95 - 110 mmol/L    CO2 21 (L) 23 - 29 mmol/L    Glucose 142 (H) 70 - 110 mg/dL    BUN, Bld 12 8 - 23 mg/dL    Creatinine 0.7 0.5 - 1.4 mg/dL    Calcium 8.8 8.7 - 10.5 mg/dL    Total Protein 5.3 (L) 6.0 - 8.4 g/dL    Albumin 2.4 (L) 3.5 - 5.2 g/dL    Total Bilirubin 0.7 0.1 - 1.0 mg/dL    Alkaline Phosphatase 303 (H) 55 - 135 U/L     (H) 10 - 40 U/L    ALT 41 10 - 44 U/L    Anion Gap 9 8 - 16 mmol/L    eGFR if African American >60.0 >60 mL/min/1.73 m^2    eGFR if non African American >60.0 >60 mL/min/1.73 m^2   Magnesium    Collection Time: 08/15/18  4:27 AM   Result Value Ref Range    Magnesium 1.5 (L) 1.6 - 2.6 mg/dL   Phosphorus    Collection Time: 08/15/18  4:27 AM   Result Value Ref Range    Phosphorus 4.4 2.7 - 4.5 mg/dL   CBC with Automated Differential    Collection Time: 08/15/18  4:27 AM   Result Value Ref Range    WBC 4.93 3.90 - 12.70 K/uL    RBC 2.47 (L) 4.00 - 5.40 M/uL    Hemoglobin 7.4 (L) 12.0 - 16.0 g/dL    Hematocrit 23.4 (L) 37.0 - 48.5 %    MCV 95 82 - 98 fL    MCH 30.0 27.0 - 31.0 pg    MCHC 31.6 (L) 32.0 - 36.0 g/dL    RDW 18.6 (H) 11.5 - 14.5 %    Platelets 203 150 - 350 K/uL    MPV 10.7 9.2 - 12.9 fL    Immature Granulocytes 4.3 (H) 0.0 - 0.5 %    Gran # (ANC) 3.7 1.8 - 7.7 K/uL    Immature Grans (Abs) 0.21 (H) 0.00 - 0.04 K/uL    Lymph # 0.8 (L) 1.0 - 4.8 K/uL    Mono # 0.2 (L) 0.3 - 1.0 K/uL    Eos # 0.0 0.0 - 0.5 K/uL    Baso # 0.01 0.00 - 0.20 K/uL    nRBC 0 0 /100 WBC    Gran% 75.1 (H) 38.0 - 73.0 %    Lymph% 17.0 (L) 18.0 - 48.0 %    Mono% 3.2 (L) 4.0 - 15.0 %    Eosinophil% 0.2 0.0 - 8.0 %     Basophil% 0.2 0.0 - 1.9 %    Differential Method Automated    Type & Screen    Collection Time: 08/15/18  4:27 AM   Result Value Ref Range    Group & Rh A POS     Indirect Nitin NEG        Diagnostic Results:  I have reviewed all pertinent imaging results/findings within the past 24 hours.      Assessment/Plan:     * Acute monocytic leukemia in remission    - Admitted from [facility] on 5/25/18 early AM with WBC around 100s, for suspected new non-M3 AML  - Lo res HLA typing on 5/26/18 and hi res on 5/27/18 done in anticipation of possible need for future transplant   - Pt with two daughters, two full sisters (in their mid 60s, one with brain aneurysm hx, other one without any medical issues), and one full brother (61 y/o healthy).  - NPM1 +, CEBPA (-), FLT3 (+).  On Midostaurin 50 mg PO BID until Day 14 (held starting 6/8 to 6/11). Stopped when FLAG JENNIFER re-induction started. Restarted Midostaurin at 25 mg bid on day 8 of FLAG JENNIFER induction (6/22), increased to full dose 50 mg bid (6/26) as improvement in liver enzymes. Stopped 7/3/18 due to abnormal cardiac echo.    - Received 7+3, day 14 bone marrow biopsy completed 6/11 showing persistent disease with 15% CD 34 positive blasts; was reinduced with FLAG-JENNIFER, day 14 restaging bone marrow biopsy done 6/29 without complication, results with no evidence of AML, FLT 3 negative  - Repeat marrow at count recovery 7/31 showed no evidence of AML  - Derm bx of right palm negative for leukemia cutis and sweets  - Admitted on 8/14/18 for planned cycle 1 hidac  -Pt received first dose at 2128 8/14/18.        Rash of hands    - Improved  - derm performed skin bx on 7/26/18  - this was negative for leukemia cutis and sweets syndrome         History of Clostridium difficile infection    - treated with PO vanc 7/25 for 2 week  -Pt completed therapy  - diarrhea improved        Acute back pain    - continue home regimen: MS contin and MSIR prn  -Pt did not complain of back pain  today.        Chemotherapy induced cardiomyopathy    - likely previous treatment  - has outpt appt with cards Dr. Agarwal on 8/17/17, will likely need to move appt date pending discharge  -Will monitor weights daily.         CINV (chemotherapy-induced nausea and vomiting)    - continue home zofran prn and compazine prn        Electrolyte abnormality    - replace per prn BMT electrolyte replacement protocol   -Magnesium 1.5 today.  Will replace        Pancytopenia due to chemotherapy    - transfuse for hgb <7 and plt <10K  -hemoglobin 7.4g/dL and plts 203 today        HTN (hypertension)    - continue home tropol, will d/c if hypotensive        Depression    - continue home lexapro, xanax prn, and ativan prn            VTE Risk Mitigation (From admission, onward)        Ordered     enoxaparin injection 40 mg  Daily      08/14/18 1817     heparin, porcine (PF) 100 unit/mL injection flush 300 Units  As needed (PRN)      08/14/18 1820     IP VTE HIGH RISK PATIENT  Once      08/14/18 1817          Disposition: pending completion of chemotherapy.    Kavon Otero MD  Bone Marrow Transplant  [facility]

## 2018-08-15 NOTE — HPI
Noreen Mac is a 68 y.o. lady with AML, nausea and vomiting 2/2 chemotherapy, chemotherapy induced cardiomyopathy, mood disorder, and essential hypertension who [place] [facility] [place].  She is otherwise doing well without significant complaints at this time.      Last Oncology clinic note on 8/13/2018    Oncology History:   68 y.o. female admitted overnight for possible new AML. Came in from OSH with elevated WBC of 100.   05/25/2018: Pt is now on hydrea 2 grams BID, allopurinol 300 mg daily, and IVF. Pt may need rasburicase this weekend. She will undergo a BM bx and aspiration today. She is an induction candidate and will not be screened for research trials. She has anxiety for which she usually takes xanax, this was re-started.   05/26/2018 PICC placed. Reports she slept well last night. Anxiety improved with prn xanax. EF 65%, HIV and Hep panel negative. 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  6/12/2018: Day 15 of 7+3 induction, restaging BM bx done yesterday. On Midostaurin. Fever yesterday and BC with gram + cocci in clusters. On cefepime day 2 and will start Vanc today. Labial mucositis improving.   6/13/2018: Day 16 7+3. BC with staph aureus, identification pending. Surveillance blood cultures done today. Afebrile x 48 hours. On cefepime and Vanc. Labial mucositis resolved. No complaints of pain. Nausea controlled. No diarrhea. Primary complaint is fatigue.   6/14/20018: Day 17 of 7+3. Day 14 bone marrow results pending. BC with staph epidermis only sensitive to Vancomycin. Vanc day 3 and cefepime day 4. Vanc trough 12.2 last night. BP remains low but stable. Afebrile x 72 hours.   6/15/2018: Day 18 of 7+3. Day 14 bone marrow biopsy shows persistent disease with 15% blasts. Discussion with patient regarding treatment and she is deciding if she wants to proceed with re-induction vs outpatient maintenance. Temp of 100.4 overnight, unsustained. Day 5 Cefepime and Day 4 of Vanc for staph  epidermis. Repeat cultures NGTD. BP improved. Electrolytes (mag, phos, K and calcium) replaced aggressively this am and will repeat labs this afternoon. No complaints this am.   06/16/2018: Day 19 of 7+3. Day 2 of Flag-JENNIFER. Remains afebrile. Calcium remains low and have increased PO supplementation. Remains on vanc and aztreonam. Somewhat loopy feeling after receiving benadryl.   06/17/2018: Day 20 of 7+3. Day 3 of FLAG-JENNIFER. Multiple electrolyte abnormalities. New bilateral leg and feet swelling.   6/18/2018: Day 21 of 7+3 and Day 4 of FLAG JENNIFER. Tolerating well with some nausea controlled with Zofran. VSS, afebrile. ANC 1900 on Neupogen. Continues Vanc for staph epi bacteremia. Multiple electrolytes replaced today. Complains of edema to lower extremities, not improved after 20 of lasix yesterday. No sob or CP.   6/19/18: Day 22 of 7+3 and Day 5 of FLAG JENNIFER. VSS, afebrile, ANC 3900. Vanc trough elevated and dose adjusted this am. Lower extremity edema improved after 40 of lasix yesterday, net negative 300 cc I&O. Mild nausea, no abdominal pain or diarrhea. Poor appetite but no difficulty eating or taking pills.   6/20/18: Day 23 of 7+3 and Day 6 of FLAG JENNIFER. Patient complains of nausea and vomiting overnight and this am, especially when taking pills. Will add Zyprexa daily in addition to Zofran, compazine, and ativan PRN and will change meds to IV. 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 Afebrile.  No diarrhea, mouth sores or pain.  06/21/2018: Day 24 of 7+3 and Day 7 of FLAG JENNIFER. Nausea better controlled. Continued on Vanc.  6/22/2018: Day 25 of 7+3 and Day 8 of FLAG JENNIFER. Nausea improved some with Zyprexa but did have 1 episode of emesis overnight.continuing meds IV due to vomiting. Remains afebrile. ANC 0. Continues Vanc and Flagyl. Electrolytes replaced.   06/23/2018 : day 26 of 7+3 and Day 9 of FLAG JENNIFER.  Nausea persists, worsened after  taking pills so meds converted to IV.  Encouraged ambulation.  Continue with flagyl for leptotrichia goodfellowii.  RUQ US showed intrahepatic dilation, overall impression hepatic steatosis.  CBD 0.5cm.  No Gallbladder.    06/24/2018 : day 27 of 7+3 and Day 10 of FLAG JENNIFER.  Nausea persisting, able to tolerate some po pills.  Last day of flagyl (day 5).  Still pancytopenic. Appetite still low as po intake triggers nausea.  06/25/2018: day 28 of 7+3 and Day 11 of FLAG JENNIFER. Afebrile, ANC 0. Has completed Flagyl. Will decrease Vanc to 1000 mg q12, trough 19.9, will complete Vanc on 6/27. Liver enzymes stable on Midostaurin. VSS.   6/26/2018: day 29 of 7+3 and day 12 of FLAG JENNIFER. Liver enzymes improved and Midostaurin increased to full dose 50 mg bid. Nausea controlled, afebrile, VSS, NEON.  6/27/2018: day 30 of 7+3 and Day 13 of FLAG JENNIFER. Persistent nausea and emesis x 1 yesterday. Compazine changed to scheduled. Vanc ends today. Afebrile, VSS. Aggressive electrolyte replacement, low electrolytes possibly due to Midostaurin.   6/28/2018: day 31 of 7+3 and Day 14 of FLAG JENNIFER. Nausea improved with scheduled Compazine. Patient has agreed to start converting some IV meds to po. VSS, afebrile, electrolytes wnl today. Only complains of fatigue, new rash noted to upper back and chest and legs, papular rash mildly red.  6/29/2018: Day 32 of 7+3 and Day 15 of FLAG JENNIFER. Day 14 restaging bone marrow biopsy done at bedside today. Patient states nausea improved but she did have emesis last night after taking 9 pm pills. Rash improved. No mouth sores, diarrhea or pain.   7/2/2018: Day 35 of 7+3 and Day 18 of FLAG JENNIFER. Restaging BM bx results pending. Fluid overload over the weekend. Chest x-ray with pulmonary edema. Os sats down to 88%. Placed on O2 at 2 L NC, off O2 this am. Received lasix. Net negative 2.7 L today. 1 episode of nausea, no emesis. Reports anxiety relieved with PRN meds. Electrolytes improved, afebrile, VSS.    7/3/2018: Day 36 of 7+3 and Day 19 of FLAG JENNIFER. Restaging Bm bx with GABRIEL. Cardiology consulted for abnormal echo, EF 30% with pulmonary HTN and severe L atrial enlargement. EKG with T wave abnormality, possible anterolateral ischemia and prolonged QTc of 530. Medications adjusted. Nausea controlled, no diarrhea. Electrolytes improved. On O2 as needed.   07/04/2018 : Day 37 of 7+3 and Day 20 of FLAG JENNIFER Diarrhea in AM, watery, no associated abdominal pain, nausea, or vomiting.    07/05/2018: Day 38 of 7+3 ane Day 21 of FLAG JENNIFER. No CP or SOB, cardiology following. On RA. All meds changed to po, denies nausea or vomiting and no diarrhea. VSS, afebrile.   7/6/2018: Day 22 of FLAG JENNIFER. Continues to tolerate po meds with no nausea. Afebrile. Awaiting count recovery for discharge.  7/7/2018-/8/2018: Day 40/41 of 7+3, Day 23/24 of FLAG JENNIFER.  Improving clinically.  Started on mag oxide per patient request.  Labs showing signs of ANC recovery.  7/9/2018: Day 25 FLAG JENNIFER, , continues Neupogen. Received platelets today. Afebrile, VSS. Tolerating all oral meds with no nausea or vomiting. Only complains of fatigue.   7/10/2018: Day 26 FLAG JENNIFER,  today. Had 2 episodes of vomiting and diarrhea last night. Feeling better. Afebrile, VSS.   7/11/2018: Day 27 FLAG JENNIFER,  today. No vomiting or diarrhea overnight and no nausea. Afebrile, VSS. Complains of back pain (bone pain) suspect due to count recovery. Relieved with oxy IR and Zyrtec started.  07/12/2018: Day 28 flag jennifer, engrafted today with ANC of 730. Back pain improved with zyrtec. Scheduled for IT chemo tomorrow at 1:30 pm and discharge home thereafter. Will likely need plt transfusion prior to IT chemo tomorrow   7/13/2018: Day 29 of FLAG JENNIFER. ANC up to 1300 today. Transfusing platelets today prior to LP for IT chemo. Patient continues to complain of bone pain, mostly to back and legs. No nausea, diarrhea, sob. Afebrile and VSS.   --hospitalized  7/23-7/25 for C.diff diarrhea, neutropenic fever.  While inpatient she developed pink blisters on her palm that were concerning for relapse.     Interval History:   Ms. Mac was hospitalized for neutropenic fever since her last clinic visit. She feels better as the diarrhea has slowed garrett to 1-2 times a day. Began PO vanc on 7/25 and will complete a 2 week course. The rib pain has resolved. Lesions on her palms remains. They do no itch and are not painful.     Her overarching complaint is unrelenting nausea, not well controlled with zofran.

## 2018-08-15 NOTE — ASSESSMENT & PLAN NOTE
- likely previous treatment  - has outpt appt with cards Dr. Agarwal on 8/17/17, will likely need to move appt date pending discharge  -Will monitor weights daily.

## 2018-08-15 NOTE — NURSING
cytarabine (PF) (CYTOSAR) 3,000 mg/m2 = 5,040 mg in sodium chloride 0.9% 250 mL chemo infusion given through left PIV over 3 hours. PIV noted for having excellent positive blood return, witnessed by LOURDES Morin RN. PO Zofran and Dexamethasone given prior to chemo.  Chemo verified at bedside with LOURDES Morin RN.  All connections secured with chemo tape and gauze; chemotherapy bin at bedside for proper disposal; chemo precautions in place. Order and consent in chart/EPIC. Education completed at this time. Will continue to monitor.

## 2018-08-15 NOTE — TELEPHONE ENCOUNTER
----- Message from Laquita Troy MD sent at 8/15/2018 10:44 AM CDT -----  1. Return to clinic on 8/4 with cbc,cmp,mag,phos

## 2018-08-15 NOTE — PLAN OF CARE
MDR's with Dr Tong.  Patient is admitted for cycle # 1 of HiDAC consolidation.  Potential d/c planned on Sunday after the completion of chemo.  OP cards appt scheduled for 8/17 has been pushed back.  EKG and echo ordered for today.  Will continue to follow for d/c needs.

## 2018-08-16 ENCOUNTER — TELEPHONE (OUTPATIENT)
Dept: PHARMACY | Facility: CLINIC | Age: 68
End: 2018-08-16

## 2018-08-16 LAB
ALBUMIN SERPL BCP-MCNC: 2.3 G/DL
ALP SERPL-CCNC: 301 U/L
ALT SERPL W/O P-5'-P-CCNC: 61 U/L
ANION GAP SERPL CALC-SCNC: 8 MMOL/L
AST SERPL-CCNC: 70 U/L
BASOPHILS # BLD AUTO: 0 K/UL
BASOPHILS NFR BLD: 0 %
BILIRUB SERPL-MCNC: 0.4 MG/DL
BLD PROD TYP BPU: NORMAL
BLOOD UNIT EXPIRATION DATE: NORMAL
BLOOD UNIT TYPE CODE: 6200
BLOOD UNIT TYPE: NORMAL
BUN SERPL-MCNC: 17 MG/DL
CALCIUM SERPL-MCNC: 8.5 MG/DL
CHLORIDE SERPL-SCNC: 110 MMOL/L
CO2 SERPL-SCNC: 20 MMOL/L
CODING SYSTEM: NORMAL
CREAT SERPL-MCNC: 0.6 MG/DL
DIFFERENTIAL METHOD: ABNORMAL
DISPENSE STATUS: NORMAL
EOSINOPHIL # BLD AUTO: 0 K/UL
EOSINOPHIL NFR BLD: 0 %
ERYTHROCYTE [DISTWIDTH] IN BLOOD BY AUTOMATED COUNT: 19.1 %
EST. GFR  (AFRICAN AMERICAN): >60 ML/MIN/1.73 M^2
EST. GFR  (NON AFRICAN AMERICAN): >60 ML/MIN/1.73 M^2
GLUCOSE SERPL-MCNC: 114 MG/DL
HCT VFR BLD AUTO: 20.6 %
HGB BLD-MCNC: 6.6 G/DL
IMM GRANULOCYTES # BLD AUTO: 0.06 K/UL
IMM GRANULOCYTES NFR BLD AUTO: 0.6 %
LYMPHOCYTES # BLD AUTO: 0.2 K/UL
LYMPHOCYTES NFR BLD: 1.4 %
MAGNESIUM SERPL-MCNC: 1.8 MG/DL
MCH RBC QN AUTO: 29.9 PG
MCHC RBC AUTO-ENTMCNC: 32 G/DL
MCV RBC AUTO: 93 FL
MONOCYTES # BLD AUTO: 0.3 K/UL
MONOCYTES NFR BLD: 2.5 %
NEUTROPHILS # BLD AUTO: 10.2 K/UL
NEUTROPHILS NFR BLD: 95.5 %
NRBC BLD-RTO: 0 /100 WBC
NUM UNITS TRANS PACKED RBC: NORMAL
PHOSPHATE SERPL-MCNC: 4 MG/DL
PLATELET # BLD AUTO: 189 K/UL
PMV BLD AUTO: 11 FL
POTASSIUM SERPL-SCNC: 4.5 MMOL/L
PROT SERPL-MCNC: 5 G/DL
RBC # BLD AUTO: 2.21 M/UL
SODIUM SERPL-SCNC: 138 MMOL/L
WBC # BLD AUTO: 10.69 K/UL

## 2018-08-16 PROCEDURE — 63600175 PHARM REV CODE 636 W HCPCS: Performed by: NURSE PRACTITIONER

## 2018-08-16 PROCEDURE — 36415 COLL VENOUS BLD VENIPUNCTURE: CPT

## 2018-08-16 PROCEDURE — P9040 RBC LEUKOREDUCED IRRADIATED: HCPCS

## 2018-08-16 PROCEDURE — 83735 ASSAY OF MAGNESIUM: CPT

## 2018-08-16 PROCEDURE — 25000003 PHARM REV CODE 250: Performed by: NURSE PRACTITIONER

## 2018-08-16 PROCEDURE — 84100 ASSAY OF PHOSPHORUS: CPT

## 2018-08-16 PROCEDURE — 20600001 HC STEP DOWN PRIVATE ROOM

## 2018-08-16 PROCEDURE — 25000003 PHARM REV CODE 250: Performed by: STUDENT IN AN ORGANIZED HEALTH CARE EDUCATION/TRAINING PROGRAM

## 2018-08-16 PROCEDURE — 36430 TRANSFUSION BLD/BLD COMPNT: CPT

## 2018-08-16 PROCEDURE — 80053 COMPREHEN METABOLIC PANEL: CPT

## 2018-08-16 PROCEDURE — 25000003 PHARM REV CODE 250: Performed by: INTERNAL MEDICINE

## 2018-08-16 PROCEDURE — 63600175 PHARM REV CODE 636 W HCPCS: Performed by: INTERNAL MEDICINE

## 2018-08-16 PROCEDURE — 85025 COMPLETE CBC W/AUTO DIFF WBC: CPT

## 2018-08-16 PROCEDURE — 99233 SBSQ HOSP IP/OBS HIGH 50: CPT | Mod: ,,, | Performed by: INTERNAL MEDICINE

## 2018-08-16 RX ORDER — ACETAMINOPHEN 325 MG/1
650 TABLET ORAL ONCE
Status: COMPLETED | OUTPATIENT
Start: 2018-08-16 | End: 2018-08-16

## 2018-08-16 RX ORDER — HYDROCODONE BITARTRATE AND ACETAMINOPHEN 500; 5 MG/1; MG/1
TABLET ORAL
Status: DISCONTINUED | OUTPATIENT
Start: 2018-08-16 | End: 2018-08-17

## 2018-08-16 RX ORDER — PROCHLORPERAZINE MALEATE 5 MG
10 TABLET ORAL 4 TIMES DAILY PRN
Status: DISCONTINUED | OUTPATIENT
Start: 2018-08-16 | End: 2018-08-19

## 2018-08-16 RX ORDER — MORPHINE SULFATE 15 MG/1
15 TABLET ORAL EVERY 4 HOURS PRN
Status: DISCONTINUED | OUTPATIENT
Start: 2018-08-16 | End: 2018-08-19 | Stop reason: HOSPADM

## 2018-08-16 RX ORDER — DIPHENHYDRAMINE HCL 25 MG
25 CAPSULE ORAL ONCE
Status: COMPLETED | OUTPATIENT
Start: 2018-08-16 | End: 2018-08-16

## 2018-08-16 RX ADMIN — DEXAMETHASONE 1 DROP: 1 SUSPENSION OPHTHALMIC at 11:08

## 2018-08-16 RX ADMIN — SODIUM CHLORIDE: 0.9 INJECTION, SOLUTION INTRAVENOUS at 09:08

## 2018-08-16 RX ADMIN — CYTARABINE 5040 MG: 100 INJECTION, SOLUTION INTRATHECAL; INTRAVENOUS; SUBCUTANEOUS at 09:08

## 2018-08-16 RX ADMIN — ACYCLOVIR 400 MG: 200 CAPSULE ORAL at 08:08

## 2018-08-16 RX ADMIN — DEXAMETHASONE 1 DROP: 1 SUSPENSION OPHTHALMIC at 05:08

## 2018-08-16 RX ADMIN — ENOXAPARIN SODIUM 40 MG: 100 INJECTION SUBCUTANEOUS at 05:08

## 2018-08-16 RX ADMIN — PANTOPRAZOLE SODIUM 40 MG: 40 TABLET, DELAYED RELEASE ORAL at 08:08

## 2018-08-16 RX ADMIN — DEXAMETHASONE 8 MG: 4 TABLET ORAL at 08:08

## 2018-08-16 RX ADMIN — ONDANSETRON 8 MG: 4 TABLET, FILM COATED ORAL at 08:08

## 2018-08-16 RX ADMIN — DIPHENHYDRAMINE HYDROCHLORIDE 25 MG: 25 CAPSULE ORAL at 06:08

## 2018-08-16 RX ADMIN — ACETAMINOPHEN 650 MG: 325 TABLET ORAL at 06:08

## 2018-08-16 RX ADMIN — ESCITALOPRAM 10 MG: 5 TABLET, FILM COATED ORAL at 08:08

## 2018-08-16 RX ADMIN — ALPRAZOLAM 0.25 MG: 0.25 TABLET ORAL at 08:08

## 2018-08-16 NOTE — PLAN OF CARE
Problem: Patient Care Overview  Goal: Plan of Care Review  Outcome: Ongoing (interventions implemented as appropriate)  Plan of care reviewed with the patient at the beginning of the shift. Pt admitted for chemotherapy. Today will be day 3 of HIDAC, she is scheduled to get dose 3 of 6 tonight. Chemo schedule reviewed with pt. Decadron eye drops q 6hr. IVF infusing. She denies n/v/d. Refusing scheduled compazine and MS Contin. Pt with previous C Diff, she is now c diff negative and has completed treatment of oral vanc. She reports daily normal bowel movements. She reports an increase in appetite and PO intake. Fall precautions maintained. Pt states she still feels weak since prior discharge. Pt remained free from falls and injury this shift. Bed locked in lowest position, side rails up x2, call light within reach. Instructed pt to call for assistance as needed. Pt verbalized understanding. Vitals stable. Pt remained afebrile. No acute issues overnight. Will continue to monitor.

## 2018-08-16 NOTE — ASSESSMENT & PLAN NOTE
- Admitted from [facility] on 5/25/18 early AM with WBC around 100s, for suspected new non-M3 AML  - Lo res HLA typing on 5/26/18 and hi res on 5/27/18 done in anticipation of possible need for future transplant   - Pt with two daughters, two full sisters (in their mid 60s, one with brain aneurysm hx, other one without any medical issues), and one full brother (61 y/o healthy).  - NPM1 +, CEBPA (-), FLT3 (+).  On Midostaurin 50 mg PO BID until Day 14 (held starting 6/8 to 6/11). Stopped when FLAG JENNIFER re-induction started. Restarted Midostaurin at 25 mg bid on day 8 of FLAG JENNIFER induction (6/22), increased to full dose 50 mg bid (6/26) as improvement in liver enzymes. Stopped 7/3/18 due to abnormal cardiac echo.    - Received 7+3, day 14 bone marrow biopsy completed 6/11 showing persistent disease with 15% CD 34 positive blasts; was reinduced with FLAG-JENNIFER, day 14 restaging bone marrow biopsy done 6/29 without complication, results with no evidence of AML, FLT 3 negative  - Repeat marrow at count recovery 7/31 showed no evidence of AML  - Derm bx of right palm negative for leukemia cutis and sweets  - Admitted on 8/14/18 for planned cycle 1 hidac  -Pt is day +3 of therapy.  -Echo showed normal EF.  Pt can receive Midostaurin 50mg BID during days 8-21 of this consolidation cycle.

## 2018-08-16 NOTE — ASSESSMENT & PLAN NOTE
- has outpt appt with cards Dr. Agarwal on 8/17/17, will likely need to move appt date pending discharge  -Will monitor weights daily.   -Echo done yesterday shows normal EF and indeterminate LV diastolic dysfunction.  -Previous decline in EF may have been infection or chemotehrapy treatment related

## 2018-08-16 NOTE — SUBJECTIVE & OBJECTIVE
Subjective:     Interval History: Pt is a 67 yo F with h/o AML FLT3+, NPM1+ s/p therapy with 7+3 with residual disease and then treatment with FLAG Maribel with GABRIEL on day 14 BM biopsy who presents to the hospital for cycle 1 of HiDAC.  The patient is day +3 of HiDAC.  Pt with no acute events overnight.  Pt denies fever, chills, CP, SOB, cough, N/V, constipation, diarrhea    Review of Systems   Constitutional: Negative for chills and fever.   HENT: Negative for congestion.    Respiratory: Negative for cough, sputum production and shortness of breath.    Cardiovascular: Negative for chest pain and leg swelling.   Gastrointestinal: Negative for abdominal pain, constipation, diarrhea, nausea and vomiting.   Neurological: Negative for headaches.       Objective:     Vital Signs (Most Recent):  Temp: (P) 97.6 °F (36.4 °C) (08/16/18 0648)  Pulse: (P) 70 (08/16/18 0648)  Resp: (P) 16 (08/16/18 0648)  BP: (!) (P) 110/58 (08/16/18 0648)  SpO2: (P) 99 % (08/16/18 0648) Vital Signs (24h Range):  Temp:  [97.4 °F (36.3 °C)-97.8 °F (36.6 °C)] (P) 97.6 °F (36.4 °C)  Pulse:  [68-83] (P) 70  Resp:  [16-17] (P) 16  SpO2:  [93 %-99 %] (P) 99 %  BP: (103-115)/(55-58) (P) 110/58     Weight: 65.2 kg (143 lb 13.6 oz)  Body mass index is 26.31 kg/m².  Body surface area is 1.69 meters squared.    ECOG SCORE         [unfilled]    Intake/Output - Last 3 Shifts       08/14 0700 - 08/15 0659 08/15 0700 - 08/16 0659    P.O. 360 1340    I.V. (mL/kg)  1714.2 (26.3)    Blood  315    IV Piggyback 250 250    Total Intake(mL/kg) 610 (9.5) 3619.2 (55.5)    Urine (mL/kg/hr)  2250 (1.4)    Stool  0    Total Output  2250    Net +610 +1369.2          Urine Occurrence  2 x    Stool Occurrence  1 x          Physical Exam   Constitutional: She is oriented to person, place, and time. She appears well-developed and well-nourished.   HENT:   Alopecia   Eyes: EOM are normal. Right eye exhibits no discharge. Left eye exhibits no discharge.   Cardiovascular: Normal  rate, regular rhythm and normal heart sounds. Exam reveals no gallop and no friction rub.   No murmur heard.  Pulmonary/Chest: Effort normal and breath sounds normal. No respiratory distress. She has no wheezes. She has no rales.   Abdominal: Soft. Bowel sounds are normal. She exhibits no distension. There is no tenderness. There is no rebound and no guarding.   Neurological: She is alert and oriented to person, place, and time.       Significant Labs:   Recent Results (from the past 24 hour(s))   2D echo with color flow doppler    Collection Time: 08/15/18  8:28 AM   Result Value Ref Range    EF 60 55 - 65    Est. PA Systolic Pressure 25.28     Mitral Valve Mobility NORMAL    Comprehensive Metabolic Panel (CMP)    Collection Time: 08/16/18  4:58 AM   Result Value Ref Range    Sodium 138 136 - 145 mmol/L    Potassium 4.5 3.5 - 5.1 mmol/L    Chloride 110 95 - 110 mmol/L    CO2 20 (L) 23 - 29 mmol/L    Glucose 114 (H) 70 - 110 mg/dL    BUN, Bld 17 8 - 23 mg/dL    Creatinine 0.6 0.5 - 1.4 mg/dL    Calcium 8.5 (L) 8.7 - 10.5 mg/dL    Total Protein 5.0 (L) 6.0 - 8.4 g/dL    Albumin 2.3 (L) 3.5 - 5.2 g/dL    Total Bilirubin 0.4 0.1 - 1.0 mg/dL    Alkaline Phosphatase 301 (H) 55 - 135 U/L    AST 70 (H) 10 - 40 U/L    ALT 61 (H) 10 - 44 U/L    Anion Gap 8 8 - 16 mmol/L    eGFR if African American >60.0 >60 mL/min/1.73 m^2    eGFR if non African American >60.0 >60 mL/min/1.73 m^2   Magnesium    Collection Time: 08/16/18  4:58 AM   Result Value Ref Range    Magnesium 1.8 1.6 - 2.6 mg/dL   Phosphorus    Collection Time: 08/16/18  4:58 AM   Result Value Ref Range    Phosphorus 4.0 2.7 - 4.5 mg/dL   CBC with Automated Differential    Collection Time: 08/16/18  4:58 AM   Result Value Ref Range    WBC 10.69 3.90 - 12.70 K/uL    RBC 2.21 (L) 4.00 - 5.40 M/uL    Hemoglobin 6.6 (L) 12.0 - 16.0 g/dL    Hematocrit 20.6 (L) 37.0 - 48.5 %    MCV 93 82 - 98 fL    MCH 29.9 27.0 - 31.0 pg    MCHC 32.0 32.0 - 36.0 g/dL    RDW 19.1 (H) 11.5 -  14.5 %    Platelets 189 150 - 350 K/uL    MPV 11.0 9.2 - 12.9 fL    Immature Granulocytes 0.6 (H) 0.0 - 0.5 %    Gran # (ANC) 10.2 (H) 1.8 - 7.7 K/uL    Immature Grans (Abs) 0.06 (H) 0.00 - 0.04 K/uL    Lymph # 0.2 (L) 1.0 - 4.8 K/uL    Mono # 0.3 0.3 - 1.0 K/uL    Eos # 0.0 0.0 - 0.5 K/uL    Baso # 0.00 0.00 - 0.20 K/uL    nRBC 0 0 /100 WBC    Gran% 95.5 (H) 38.0 - 73.0 %    Lymph% 1.4 (L) 18.0 - 48.0 %    Mono% 2.5 (L) 4.0 - 15.0 %    Eosinophil% 0.0 0.0 - 8.0 %    Basophil% 0.0 0.0 - 1.9 %    Differential Method Automated          Diagnostic Results:  I have reviewed all pertinent imaging results/findings within the past 24 hours.    EKG:  Normal sinus rhythm  Nonspecific T wave abnormality  Otherwise normal ECG  When compared with ECG of 22-JUL-2018 20:46,  No significant change was found    Echo:    1 - Moderate left atrial enlargement.     2 - Normal left ventricular systolic function (EF 60-65%).     3 - No wall motion abnormalities.     4 - Indeterminate LV diastolic function.     5 - Normal right ventricular systolic function .     6 - The estimated PA systolic pressure is 25 mmHg.

## 2018-08-16 NOTE — PROGRESS NOTES
[facility]  Hematology  Bone Marrow Transplant  Progress Note    Patient Name: Noreen Mac  Admission Date: 8/14/2018  Hospital Length of Stay: 2 days  Code Status: Full Code    Subjective:     Interval History: Pt is a 67 yo F with h/o AML FLT3+, NPM1+ s/p therapy with 7+3 with residual disease and then treatment with FLAG Maribel with GABRIEL on day 14 BM biopsy who presents to the hospital for cycle 1 of HiDAC.  The patient is day +3 of HiDAC.  Pt with no acute events overnight.  Pt denies fever, chills, CP, SOB, cough, N/V, constipation, diarrhea    Review of Systems   Constitutional: Negative for chills and fever.   HENT: Negative for congestion.    Respiratory: Negative for cough, sputum production and shortness of breath.    Cardiovascular: Negative for chest pain and leg swelling.   Gastrointestinal: Negative for abdominal pain, constipation, diarrhea, nausea and vomiting.   Neurological: Negative for headaches.       Objective:     Vital Signs (Most Recent):  Temp: (P) 97.6 °F (36.4 °C) (08/16/18 0648)  Pulse: (P) 70 (08/16/18 0648)  Resp: (P) 16 (08/16/18 0648)  BP: (!) (P) 110/58 (08/16/18 0648)  SpO2: (P) 99 % (08/16/18 0648) Vital Signs (24h Range):  Temp:  [97.4 °F (36.3 °C)-97.8 °F (36.6 °C)] (P) 97.6 °F (36.4 °C)  Pulse:  [68-83] (P) 70  Resp:  [16-17] (P) 16  SpO2:  [93 %-99 %] (P) 99 %  BP: (103-115)/(55-58) (P) 110/58     Weight: 65.2 kg (143 lb 13.6 oz)  Body mass index is 26.31 kg/m².  Body surface area is 1.69 meters squared.    ECOG SCORE         [unfilled]    Intake/Output - Last 3 Shifts       08/14 0700 - 08/15 0659 08/15 0700 - 08/16 0659    P.O. 360 1340    I.V. (mL/kg)  1714.2 (26.3)    Blood  315    IV Piggyback 250 250    Total Intake(mL/kg) 610 (9.5) 3619.2 (55.5)    Urine (mL/kg/hr)  2250 (1.4)    Stool  0    Total Output  2250    Net +610 +1369.2          Urine Occurrence  2 x    Stool Occurrence  1 x          Physical Exam   Constitutional: She is oriented to person,  place, and time. She appears well-developed and well-nourished.   HENT:   Alopecia   Eyes: EOM are normal. Right eye exhibits no discharge. Left eye exhibits no discharge.   Cardiovascular: Normal rate, regular rhythm and normal heart sounds. Exam reveals no gallop and no friction rub.   No murmur heard.  Pulmonary/Chest: Effort normal and breath sounds normal. No respiratory distress. She has no wheezes. She has no rales.   Abdominal: Soft. Bowel sounds are normal. She exhibits no distension. There is no tenderness. There is no rebound and no guarding.   Neurological: She is alert and oriented to person, place, and time.       Significant Labs:   Recent Results (from the past 24 hour(s))   2D echo with color flow doppler    Collection Time: 08/15/18  8:28 AM   Result Value Ref Range    EF 60 55 - 65    Est. PA Systolic Pressure 25.28     Mitral Valve Mobility NORMAL    Comprehensive Metabolic Panel (CMP)    Collection Time: 08/16/18  4:58 AM   Result Value Ref Range    Sodium 138 136 - 145 mmol/L    Potassium 4.5 3.5 - 5.1 mmol/L    Chloride 110 95 - 110 mmol/L    CO2 20 (L) 23 - 29 mmol/L    Glucose 114 (H) 70 - 110 mg/dL    BUN, Bld 17 8 - 23 mg/dL    Creatinine 0.6 0.5 - 1.4 mg/dL    Calcium 8.5 (L) 8.7 - 10.5 mg/dL    Total Protein 5.0 (L) 6.0 - 8.4 g/dL    Albumin 2.3 (L) 3.5 - 5.2 g/dL    Total Bilirubin 0.4 0.1 - 1.0 mg/dL    Alkaline Phosphatase 301 (H) 55 - 135 U/L    AST 70 (H) 10 - 40 U/L    ALT 61 (H) 10 - 44 U/L    Anion Gap 8 8 - 16 mmol/L    eGFR if African American >60.0 >60 mL/min/1.73 m^2    eGFR if non African American >60.0 >60 mL/min/1.73 m^2   Magnesium    Collection Time: 08/16/18  4:58 AM   Result Value Ref Range    Magnesium 1.8 1.6 - 2.6 mg/dL   Phosphorus    Collection Time: 08/16/18  4:58 AM   Result Value Ref Range    Phosphorus 4.0 2.7 - 4.5 mg/dL   CBC with Automated Differential    Collection Time: 08/16/18  4:58 AM   Result Value Ref Range    WBC 10.69 3.90 - 12.70 K/uL    RBC 2.21  (L) 4.00 - 5.40 M/uL    Hemoglobin 6.6 (L) 12.0 - 16.0 g/dL    Hematocrit 20.6 (L) 37.0 - 48.5 %    MCV 93 82 - 98 fL    MCH 29.9 27.0 - 31.0 pg    MCHC 32.0 32.0 - 36.0 g/dL    RDW 19.1 (H) 11.5 - 14.5 %    Platelets 189 150 - 350 K/uL    MPV 11.0 9.2 - 12.9 fL    Immature Granulocytes 0.6 (H) 0.0 - 0.5 %    Gran # (ANC) 10.2 (H) 1.8 - 7.7 K/uL    Immature Grans (Abs) 0.06 (H) 0.00 - 0.04 K/uL    Lymph # 0.2 (L) 1.0 - 4.8 K/uL    Mono # 0.3 0.3 - 1.0 K/uL    Eos # 0.0 0.0 - 0.5 K/uL    Baso # 0.00 0.00 - 0.20 K/uL    nRBC 0 0 /100 WBC    Gran% 95.5 (H) 38.0 - 73.0 %    Lymph% 1.4 (L) 18.0 - 48.0 %    Mono% 2.5 (L) 4.0 - 15.0 %    Eosinophil% 0.0 0.0 - 8.0 %    Basophil% 0.0 0.0 - 1.9 %    Differential Method Automated          Diagnostic Results:  I have reviewed all pertinent imaging results/findings within the past 24 hours.    EKG:  Normal sinus rhythm  Nonspecific T wave abnormality  Otherwise normal ECG  When compared with ECG of 22-JUL-2018 20:46,  No significant change was found    Echo:    1 - Moderate left atrial enlargement.     2 - Normal left ventricular systolic function (EF 60-65%).     3 - No wall motion abnormalities.     4 - Indeterminate LV diastolic function.     5 - Normal right ventricular systolic function .     6 - The estimated PA systolic pressure is 25 mmHg.     Assessment/Plan:     * Acute monocytic leukemia in remission    - Admitted from [facility] on 5/25/18 early AM with WBC around 100s, for suspected new non-M3 AML  - Lo res HLA typing on 5/26/18 and hi res on 5/27/18 done in anticipation of possible need for future transplant   - Pt with two daughters, two full sisters (in their mid 60s, one with brain aneurysm hx, other one without any medical issues), and one full brother (61 y/o healthy).  - NPM1 +, CEBPA (-), FLT3 (+).  On Midostaurin 50 mg PO BID until Day 14 (held starting 6/8 to 6/11). Stopped when FLAG JENNIFER re-induction started. Restarted Midostaurin at 25 mg bid on day  8 of FLAG JENNIFER induction (6/22), increased to full dose 50 mg bid (6/26) as improvement in liver enzymes. Stopped 7/3/18 due to abnormal cardiac echo.    - Received 7+3, day 14 bone marrow biopsy completed 6/11 showing persistent disease with 15% CD 34 positive blasts; was reinduced with FLAG-JENNIFER, day 14 restaging bone marrow biopsy done 6/29 without complication, results with no evidence of AML, FLT 3 negative  - Repeat marrow at count recovery 7/31 showed no evidence of AML  - Derm bx of right palm negative for leukemia cutis and sweets  - Admitted on 8/14/18 for planned cycle 1 hidac  -Pt is day +3 of therapy.  -Echo showed normal EF.  Pt can receive Midostaurin 50mg BID during days 8-21 of this consolidation cycle.        Rash of hands    - Rash on hand improved.  - derm performed skin bx  - this was negative for leukemia cutis and sweets syndrome         History of Clostridium difficile infection    - treated with PO vanc 7/25 for 2 week  -Pt completed therapy  - diarrhea improved        Acute back pain    - continue home regimen: MS contin and MSIR prn  -Pt did not complain of back pain today.        Chemotherapy induced cardiomyopathy    - has outpt appt with cards Dr. Agarwal on 8/17/17, will likely need to move appt date pending discharge  -Will monitor weights daily.   -Echo done yesterday shows normal EF and indeterminate LV diastolic dysfunction.  -Previous decline in EF may have been infection or chemotehrapy treatment related        CINV (chemotherapy-induced nausea and vomiting)    - continue home zofran prn and compazine prn        Electrolyte abnormality    -replace per prn BMT electrolyte replacement protocol   -Magnesium 1.8 today.  Will replace        Pancytopenia due to chemotherapy    - transfuse for hgb <7 and plt <10K  -hemoglobin 6.6g/dL and plts 189k today  -Will transfuse one unit of PRBC's today 8/16/18.        HTN (hypertension)    - continue home tropol, will d/c if hypotensive         Depression    - continue home lexapro, xanax prn, and ativan prn            VTE Risk Mitigation (From admission, onward)        Ordered     enoxaparin injection 40 mg  Daily      08/14/18 1817     heparin, porcine (PF) 100 unit/mL injection flush 300 Units  As needed (PRN)      08/14/18 1820     IP VTE HIGH RISK PATIENT  Once      08/14/18 1817          Disposition: Pending completion of tx.  D/C likely 8/19/18    Kavon Otero MD  Bone Marrow Transplant  [facility]

## 2018-08-16 NOTE — ASSESSMENT & PLAN NOTE
- transfuse for hgb <7 and plt <10K  -hemoglobin 6.6g/dL and plts 189k today  -Will transfuse one unit of PRBC's today 8/16/18.

## 2018-08-16 NOTE — PLAN OF CARE
Problem: Patient Care Overview  Goal: Plan of Care Review  PT IN BED WITH NO COMPLAINTS AT THIS TIME. DAUGHTER AT BEDSIDE. PT STATED THAT APPETITE IS BETTER TODAY. PT AMBULATED IN ROOM WITH OUT DIFFICULTY. ALL QUESTIONS ANSWERED. NO ACUTE EVENTS DURING SHIFT. PT TOLERATED ALL ACTIVITIES WELL. PT VOIDING WITH NO DIFFICULTIES. PT PARTICIPATED WITH APPROPRIATE THERAPIES. ALL QUESTIONS ANSWERED. WILL ENDORSE CARE TO NOC RN.

## 2018-08-16 NOTE — ASSESSMENT & PLAN NOTE
- Rash on hand improved.  - derm performed skin bx  - this was negative for leukemia cutis and sweets syndrome

## 2018-08-17 LAB
ALBUMIN SERPL BCP-MCNC: 2.3 G/DL
ALP SERPL-CCNC: 241 U/L
ALT SERPL W/O P-5'-P-CCNC: 38 U/L
ANION GAP SERPL CALC-SCNC: 7 MMOL/L
AST SERPL-CCNC: 29 U/L
BASOPHILS # BLD AUTO: 0 K/UL
BASOPHILS NFR BLD: 0 %
BILIRUB SERPL-MCNC: 0.5 MG/DL
BUN SERPL-MCNC: 13 MG/DL
CALCIUM SERPL-MCNC: 8.6 MG/DL
CHLORIDE SERPL-SCNC: 111 MMOL/L
CO2 SERPL-SCNC: 23 MMOL/L
CREAT SERPL-MCNC: 0.6 MG/DL
DIFFERENTIAL METHOD: ABNORMAL
EOSINOPHIL # BLD AUTO: 0 K/UL
EOSINOPHIL NFR BLD: 0 %
ERYTHROCYTE [DISTWIDTH] IN BLOOD BY AUTOMATED COUNT: 18.4 %
EST. GFR  (AFRICAN AMERICAN): >60 ML/MIN/1.73 M^2
EST. GFR  (NON AFRICAN AMERICAN): >60 ML/MIN/1.73 M^2
GLUCOSE SERPL-MCNC: 115 MG/DL
HCT VFR BLD AUTO: 23.1 %
HGB BLD-MCNC: 7.5 G/DL
IMM GRANULOCYTES # BLD AUTO: 0.02 K/UL
IMM GRANULOCYTES NFR BLD AUTO: 0.4 %
LYMPHOCYTES # BLD AUTO: 0.1 K/UL
LYMPHOCYTES NFR BLD: 1.8 %
MAGNESIUM SERPL-MCNC: 1.6 MG/DL
MCH RBC QN AUTO: 30.1 PG
MCHC RBC AUTO-ENTMCNC: 32.5 G/DL
MCV RBC AUTO: 93 FL
MONOCYTES # BLD AUTO: 0 K/UL
MONOCYTES NFR BLD: 0.7 %
NEUTROPHILS # BLD AUTO: 5.4 K/UL
NEUTROPHILS NFR BLD: 97.1 %
NRBC BLD-RTO: 0 /100 WBC
PHOSPHATE SERPL-MCNC: 3.3 MG/DL
PLATELET # BLD AUTO: 179 K/UL
PMV BLD AUTO: 10.7 FL
POTASSIUM SERPL-SCNC: 4.9 MMOL/L
PROT SERPL-MCNC: 4.9 G/DL
RBC # BLD AUTO: 2.49 M/UL
SODIUM SERPL-SCNC: 141 MMOL/L
WBC # BLD AUTO: 5.53 K/UL

## 2018-08-17 PROCEDURE — 83735 ASSAY OF MAGNESIUM: CPT

## 2018-08-17 PROCEDURE — 85025 COMPLETE CBC W/AUTO DIFF WBC: CPT

## 2018-08-17 PROCEDURE — 20600001 HC STEP DOWN PRIVATE ROOM

## 2018-08-17 PROCEDURE — 36415 COLL VENOUS BLD VENIPUNCTURE: CPT

## 2018-08-17 PROCEDURE — 80053 COMPREHEN METABOLIC PANEL: CPT

## 2018-08-17 PROCEDURE — 99232 SBSQ HOSP IP/OBS MODERATE 35: CPT | Mod: GC,,, | Performed by: INTERNAL MEDICINE

## 2018-08-17 PROCEDURE — 63600175 PHARM REV CODE 636 W HCPCS: Performed by: INTERNAL MEDICINE

## 2018-08-17 PROCEDURE — 97165 OT EVAL LOW COMPLEX 30 MIN: CPT

## 2018-08-17 PROCEDURE — 97161 PT EVAL LOW COMPLEX 20 MIN: CPT

## 2018-08-17 PROCEDURE — 84100 ASSAY OF PHOSPHORUS: CPT

## 2018-08-17 PROCEDURE — 25000003 PHARM REV CODE 250: Performed by: INTERNAL MEDICINE

## 2018-08-17 PROCEDURE — 63600175 PHARM REV CODE 636 W HCPCS: Performed by: NURSE PRACTITIONER

## 2018-08-17 PROCEDURE — 25000003 PHARM REV CODE 250: Performed by: NURSE PRACTITIONER

## 2018-08-17 RX ADMIN — ALPRAZOLAM 0.25 MG: 0.25 TABLET ORAL at 12:08

## 2018-08-17 RX ADMIN — ACYCLOVIR 400 MG: 200 CAPSULE ORAL at 08:08

## 2018-08-17 RX ADMIN — DEXAMETHASONE 1 DROP: 1 SUSPENSION OPHTHALMIC at 05:08

## 2018-08-17 RX ADMIN — ENOXAPARIN SODIUM 40 MG: 100 INJECTION SUBCUTANEOUS at 05:08

## 2018-08-17 RX ADMIN — CYTARABINE 5040 MG: 100 INJECTION, SOLUTION INTRATHECAL; INTRAVENOUS; SUBCUTANEOUS at 09:08

## 2018-08-17 RX ADMIN — ALPRAZOLAM 0.25 MG: 0.25 TABLET ORAL at 08:08

## 2018-08-17 RX ADMIN — PANTOPRAZOLE SODIUM 40 MG: 40 TABLET, DELAYED RELEASE ORAL at 08:08

## 2018-08-17 RX ADMIN — DEXAMETHASONE 1 DROP: 1 SUSPENSION OPHTHALMIC at 11:08

## 2018-08-17 RX ADMIN — ONDANSETRON 8 MG: 4 TABLET, FILM COATED ORAL at 08:08

## 2018-08-17 RX ADMIN — DEXAMETHASONE 8 MG: 4 TABLET ORAL at 08:08

## 2018-08-17 RX ADMIN — ESCITALOPRAM 10 MG: 5 TABLET, FILM COATED ORAL at 08:08

## 2018-08-17 NOTE — PLAN OF CARE
Problem: Occupational Therapy Goal  Goal: Occupational Therapy Goal  Pt will indep perform ADLs for 3 consecutive sessions.   Continue OT POC.     Comments: Yonis Oglesby OTR/L  8/17/2018

## 2018-08-17 NOTE — PLAN OF CARE
Problem: Patient Care Overview  Goal: Plan of Care Review  Noreen Mac is a 68 y.o. female admitted with a medical diagnosis of Acute monocytic leukemia in remission. Tolerated evaluation well this morning. Ambulates 450 ft independently, no device, wearing mask. Able to hold conversation while ambulating, denies fatigue or SOB. Discussed continuing to mobilize with nsg or family when able. Will keep on PT list for 1x/week to visit to ensure no decrease in mobility/strength/endurance while receiving inpatient chemotherapy, pt in agreement and verbalized understanding.    Thang Israel, PT  8/17/2018

## 2018-08-17 NOTE — ASSESSMENT & PLAN NOTE
- transfuse for hgb <7 and plt <10K  -hemoglobin 7.5g/dL and plts 179k today  -Will transfuse one unit of PRBC's today 8/16/18.

## 2018-08-17 NOTE — ASSESSMENT & PLAN NOTE
- has outpt appt with cards Dr. Agarwal on 8/17/17, will likely need to move appt date pending discharge  -Will monitor weights daily.   -Echo done 8/15/18 showed normal EF and indeterminate LV diastolic dysfunction.  -Previous decline in EF may have been infection or chemotehrapy treatment related

## 2018-08-17 NOTE — PLAN OF CARE
Problem: Patient Care Overview  Goal: Plan of Care Review  Outcome: Ongoing (interventions implemented as appropriate)  Plan of care reviewed with the patient at the beginning of the shift. Pt admitted for chemotherapy. Today will be day 4 of HIDAC, she recieved dose 3 of 6 overnight. Chemo schedule reviewed with pt. Decadron eye drops q 6hr. IVF infusing. She denies n/v/d. Refusing scheduled compazine and MS Contin. Pt with previous C Diff, she is now c diff negative and has completed treatment of oral vanc. She reports daily normal bowel movements. She reports an increase in appetite and PO intake. Fall precautions maintained. Pt states she still feels weak since prior discharge. Pt remained free from falls and injury this shift. Bed locked in lowest position, side rails up x2, call light within reach. Instructed pt to call for assistance as needed. Pt verbalized understanding. Vitals stable. Pt remained afebrile. No acute issues overnight. Will continue to monitor.

## 2018-08-17 NOTE — ASSESSMENT & PLAN NOTE
- Admitted from [facility] on 5/25/18 early AM with WBC around 100s, for suspected new non-M3 AML  - Lo res HLA typing on 5/26/18 and hi res on 5/27/18 done in anticipation of possible need for future transplant   - Pt with two daughters, two full sisters (in their mid 60s, one with brain aneurysm hx, other one without any medical issues), and one full brother (61 y/o healthy).  - NPM1 +, CEBPA (-), FLT3 (+).  On Midostaurin 50 mg PO BID until Day 14 (held starting 6/8 to 6/11). Stopped when FLAG JENNIFER re-induction started. Restarted Midostaurin at 25 mg bid on day 8 of FLAG JENNIFER induction (6/22), increased to full dose 50 mg bid (6/26) as improvement in liver enzymes. Stopped 7/3/18 due to abnormal cardiac echo.    - Received 7+3, day 14 bone marrow biopsy completed 6/11 showing persistent disease with 15% CD 34 positive blasts; was reinduced with FLAG-JENNIFER, day 14 restaging bone marrow biopsy done 6/29 without complication, results with no evidence of AML, FLT 3 negative  - Repeat marrow at count recovery 7/31 showed no evidence of AML  - Derm bx of right palm negative for leukemia cutis and sweets  - Admitted on 8/14/18 for planned cycle 1 hidac  -Pt is day +4 of therapy.  -Echo showed normal EF.  Pt can receive Midostaurin 50mg BID during days 8-21 of this consolidation cycle.

## 2018-08-17 NOTE — PROGRESS NOTES
[facility]  Hematology  Bone Marrow Transplant  Progress Note    Patient Name: Noreen Mac  Admission Date: 8/14/2018  Hospital Length of Stay: 3 days  Code Status: Full Code    Subjective:     Interval History: Pt is a 69 yo F with h/o AML FLT3+, NPM1+ s/p therapy with 7+3 with residual disease and then treatment with FLAG Maribel with GABRIEL on day 14 BM biopsy who presented to the hospital for cycle 1 of HiDAC.  The patient is day +4 of HiDAC.  Pt with no acute events overnight.  Pt denies fever, chills, CP, SOB, cough, N/V, constipation, diarrhea    Review of Systems   Constitutional: Negative for chills and fever.   HENT: Negative for congestion.    Respiratory: Negative for cough, sputum production and shortness of breath.    Cardiovascular: Negative for chest pain and leg swelling.   Gastrointestinal: Negative for abdominal pain, constipation, diarrhea, nausea and vomiting.   Neurological: Negative for headaches.       Objective:     Vital Signs (Most Recent):  Temp: 98.2 °F (36.8 °C) (08/17/18 0731)  Pulse: 66 (08/17/18 0731)  Resp: 14 (08/17/18 0731)  BP: 138/60 (08/17/18 0731)  SpO2: 96 % (08/17/18 0731) Vital Signs (24h Range):  Temp:  [97.4 °F (36.3 °C)-98.3 °F (36.8 °C)] 98.2 °F (36.8 °C)  Pulse:  [59-71] 66  Resp:  [14-18] 14  SpO2:  [96 %-98 %] 96 %  BP: (118-138)/(58-64) 138/60     Weight: 66.4 kg (146 lb 7.9 oz)  Body mass index is 26.79 kg/m².  Body surface area is 1.7 meters squared.    ECOG SCORE         [unfilled]    Intake/Output - Last 3 Shifts       08/15 0700 - 08/16 0659 08/16 0700 - 08/17 0659 08/17 0700 - 08/18 0659    P.O. 1340 1720     I.V. (mL/kg) 1714.2 (26.3) 978.3 (14.7)     Blood 315 282.5     IV Piggyback 250 250     Total Intake(mL/kg) 3619.2 (55.5) 3230.8 (48.7)     Urine (mL/kg/hr) 2250 (1.4) 2300 (1.4) 400 (6.2)    Stool 0 0     Total Output 2250 2300 400    Net +1369.2 +930.8 -400           Urine Occurrence 2 x 5 x     Stool Occurrence 1 x 1 x            Physical Exam   Constitutional: She is oriented to person, place, and time. She appears well-developed and well-nourished.   HENT:   Alopecia   Eyes: EOM are normal. Right eye exhibits no discharge. Left eye exhibits no discharge.   Cardiovascular: Normal rate, regular rhythm and normal heart sounds. Exam reveals no gallop and no friction rub.   No murmur heard.  Pulmonary/Chest: Effort normal and breath sounds normal. No respiratory distress. She has no wheezes. She has no rales.   Abdominal: Soft. Bowel sounds are normal. She exhibits no distension. There is no tenderness. There is no rebound and no guarding.   Neurological: She is alert and oriented to person, place, and time.       Significant Labs:   Recent Results (from the past 24 hour(s))   Comprehensive Metabolic Panel (CMP)    Collection Time: 08/17/18  4:47 AM   Result Value Ref Range    Sodium 141 136 - 145 mmol/L    Potassium 4.9 3.5 - 5.1 mmol/L    Chloride 111 (H) 95 - 110 mmol/L    CO2 23 23 - 29 mmol/L    Glucose 115 (H) 70 - 110 mg/dL    BUN, Bld 13 8 - 23 mg/dL    Creatinine 0.6 0.5 - 1.4 mg/dL    Calcium 8.6 (L) 8.7 - 10.5 mg/dL    Total Protein 4.9 (L) 6.0 - 8.4 g/dL    Albumin 2.3 (L) 3.5 - 5.2 g/dL    Total Bilirubin 0.5 0.1 - 1.0 mg/dL    Alkaline Phosphatase 241 (H) 55 - 135 U/L    AST 29 10 - 40 U/L    ALT 38 10 - 44 U/L    Anion Gap 7 (L) 8 - 16 mmol/L    eGFR if African American >60.0 >60 mL/min/1.73 m^2    eGFR if non African American >60.0 >60 mL/min/1.73 m^2   Magnesium    Collection Time: 08/17/18  4:47 AM   Result Value Ref Range    Magnesium 1.6 1.6 - 2.6 mg/dL   Phosphorus    Collection Time: 08/17/18  4:47 AM   Result Value Ref Range    Phosphorus 3.3 2.7 - 4.5 mg/dL   CBC with Automated Differential    Collection Time: 08/17/18  4:47 AM   Result Value Ref Range    WBC 5.53 3.90 - 12.70 K/uL    RBC 2.49 (L) 4.00 - 5.40 M/uL    Hemoglobin 7.5 (L) 12.0 - 16.0 g/dL    Hematocrit 23.1 (L) 37.0 - 48.5 %    MCV 93 82 - 98 fL     MCH 30.1 27.0 - 31.0 pg    MCHC 32.5 32.0 - 36.0 g/dL    RDW 18.4 (H) 11.5 - 14.5 %    Platelets 179 150 - 350 K/uL    MPV 10.7 9.2 - 12.9 fL    Immature Granulocytes 0.4 0.0 - 0.5 %    Gran # (ANC) 5.4 1.8 - 7.7 K/uL    Immature Grans (Abs) 0.02 0.00 - 0.04 K/uL    Lymph # 0.1 (L) 1.0 - 4.8 K/uL    Mono # 0.0 (L) 0.3 - 1.0 K/uL    Eos # 0.0 0.0 - 0.5 K/uL    Baso # 0.00 0.00 - 0.20 K/uL    nRBC 0 0 /100 WBC    Gran% 97.1 (H) 38.0 - 73.0 %    Lymph% 1.8 (L) 18.0 - 48.0 %    Mono% 0.7 (L) 4.0 - 15.0 %    Eosinophil% 0.0 0.0 - 8.0 %    Basophil% 0.0 0.0 - 1.9 %    Differential Method Automated          Diagnostic Results:  I have reviewed all pertinent imaging results/findings within the past 24 hours.      Assessment/Plan:     * Acute monocytic leukemia in remission    - Admitted from [facility] on 5/25/18 early AM with WBC around 100s, for suspected new non-M3 AML  - Lo res HLA typing on 5/26/18 and hi res on 5/27/18 done in anticipation of possible need for future transplant   - Pt with two daughters, two full sisters (in their mid 60s, one with brain aneurysm hx, other one without any medical issues), and one full brother (59 y/o healthy).  - NPM1 +, CEBPA (-), FLT3 (+).  On Midostaurin 50 mg PO BID until Day 14 (held starting 6/8 to 6/11). Stopped when FLAG JENNIFER re-induction started. Restarted Midostaurin at 25 mg bid on day 8 of FLAG JENNIFER induction (6/22), increased to full dose 50 mg bid (6/26) as improvement in liver enzymes. Stopped 7/3/18 due to abnormal cardiac echo.    - Received 7+3, day 14 bone marrow biopsy completed 6/11 showing persistent disease with 15% CD 34 positive blasts; was reinduced with FLAG-JENNIFER, day 14 restaging bone marrow biopsy done 6/29 without complication, results with no evidence of AML, FLT 3 negative  - Repeat marrow at count recovery 7/31 showed no evidence of AML  - Derm bx of right palm negative for leukemia cutis and sweets  - Admitted on 8/14/18 for planned cycle 1  hidac  -Pt is day +4 of therapy.  -Echo showed normal EF.  Pt can receive Midostaurin 50mg BID during days 8-21 of this consolidation cycle.        Rash of hands    - Rash on hand improved.  - derm performed skin bx  - this was negative for leukemia cutis and sweets syndrome         History of Clostridium difficile infection    - treated with PO vanc 7/25 for 2 week  -Pt completed therapy  - diarrhea improved        Acute back pain    -pain has resolved.  -The patient no longer wants to take the long acting pain medication        Chemotherapy induced cardiomyopathy    - has outpt appt with cards Dr. Agarwal on 8/17/17, will likely need to move appt date pending discharge  -Will monitor weights daily.   -Echo done 8/15/18 showed normal EF and indeterminate LV diastolic dysfunction.  -Previous decline in EF may have been infection or chemotehrapy treatment related        CINV (chemotherapy-induced nausea and vomiting)    - continue home zofran prn and compazine prn        Electrolyte abnormality    -replace per prn BMT electrolyte replacement protocol   -Magnesium 1.6 today.         Pancytopenia due to chemotherapy    - transfuse for hgb <7 and plt <10K  -hemoglobin 7.5g/dL and plts 179k today  -Will transfuse one unit of PRBC's today 8/16/18.        HTN (hypertension)    - continue home tropol, will d/c if hypotensive        Depression    - continue home lexapro, xanax prn, and ativan prn            VTE Risk Mitigation (From admission, onward)        Ordered     enoxaparin injection 40 mg  Daily      08/14/18 1817     heparin, porcine (PF) 100 unit/mL injection flush 300 Units  As needed (PRN)      08/14/18 1820     IP VTE HIGH RISK PATIENT  Once      08/14/18 1817          Disposition: d/c home pending completion of chemotherapy.    Kavon Otero MD  Bone Marrow Transplant  [facility]

## 2018-08-17 NOTE — SUBJECTIVE & OBJECTIVE
Subjective:     Interval History: Pt is a 69 yo F with h/o AML FLT3+, NPM1+ s/p therapy with 7+3 with residual disease and then treatment with FLAG Maribel with GABRIEL on day 14 BM biopsy who presented to the hospital for cycle 1 of HiDAC.  The patient is day +4 of HiDAC.  Pt with no acute events overnight.  Pt denies fever, chills, CP, SOB, cough, N/V, constipation, diarrhea    Review of Systems   Constitutional: Negative for chills and fever.   HENT: Negative for congestion.    Respiratory: Negative for cough, sputum production and shortness of breath.    Cardiovascular: Negative for chest pain and leg swelling.   Gastrointestinal: Negative for abdominal pain, constipation, diarrhea, nausea and vomiting.   Neurological: Negative for headaches.       Objective:     Vital Signs (Most Recent):  Temp: 98.2 °F (36.8 °C) (08/17/18 0731)  Pulse: 66 (08/17/18 0731)  Resp: 14 (08/17/18 0731)  BP: 138/60 (08/17/18 0731)  SpO2: 96 % (08/17/18 0731) Vital Signs (24h Range):  Temp:  [97.4 °F (36.3 °C)-98.3 °F (36.8 °C)] 98.2 °F (36.8 °C)  Pulse:  [59-71] 66  Resp:  [14-18] 14  SpO2:  [96 %-98 %] 96 %  BP: (118-138)/(58-64) 138/60     Weight: 66.4 kg (146 lb 7.9 oz)  Body mass index is 26.79 kg/m².  Body surface area is 1.7 meters squared.    ECOG SCORE         [unfilled]    Intake/Output - Last 3 Shifts       08/15 0700 - 08/16 0659 08/16 0700 - 08/17 0659 08/17 0700 - 08/18 0659    P.O. 1340 1720     I.V. (mL/kg) 1714.2 (26.3) 978.3 (14.7)     Blood 315 282.5     IV Piggyback 250 250     Total Intake(mL/kg) 3619.2 (55.5) 3230.8 (48.7)     Urine (mL/kg/hr) 2250 (1.4) 2300 (1.4) 400 (6.2)    Stool 0 0     Total Output 2250 2300 400    Net +1369.2 +930.8 -400           Urine Occurrence 2 x 5 x     Stool Occurrence 1 x 1 x           Physical Exam   Constitutional: She is oriented to person, place, and time. She appears well-developed and well-nourished.   HENT:   Alopecia   Eyes: EOM are normal. Right eye exhibits no discharge. Left eye  exhibits no discharge.   Cardiovascular: Normal rate, regular rhythm and normal heart sounds. Exam reveals no gallop and no friction rub.   No murmur heard.  Pulmonary/Chest: Effort normal and breath sounds normal. No respiratory distress. She has no wheezes. She has no rales.   Abdominal: Soft. Bowel sounds are normal. She exhibits no distension. There is no tenderness. There is no rebound and no guarding.   Neurological: She is alert and oriented to person, place, and time.       Significant Labs:   Recent Results (from the past 24 hour(s))   Comprehensive Metabolic Panel (CMP)    Collection Time: 08/17/18  4:47 AM   Result Value Ref Range    Sodium 141 136 - 145 mmol/L    Potassium 4.9 3.5 - 5.1 mmol/L    Chloride 111 (H) 95 - 110 mmol/L    CO2 23 23 - 29 mmol/L    Glucose 115 (H) 70 - 110 mg/dL    BUN, Bld 13 8 - 23 mg/dL    Creatinine 0.6 0.5 - 1.4 mg/dL    Calcium 8.6 (L) 8.7 - 10.5 mg/dL    Total Protein 4.9 (L) 6.0 - 8.4 g/dL    Albumin 2.3 (L) 3.5 - 5.2 g/dL    Total Bilirubin 0.5 0.1 - 1.0 mg/dL    Alkaline Phosphatase 241 (H) 55 - 135 U/L    AST 29 10 - 40 U/L    ALT 38 10 - 44 U/L    Anion Gap 7 (L) 8 - 16 mmol/L    eGFR if African American >60.0 >60 mL/min/1.73 m^2    eGFR if non African American >60.0 >60 mL/min/1.73 m^2   Magnesium    Collection Time: 08/17/18  4:47 AM   Result Value Ref Range    Magnesium 1.6 1.6 - 2.6 mg/dL   Phosphorus    Collection Time: 08/17/18  4:47 AM   Result Value Ref Range    Phosphorus 3.3 2.7 - 4.5 mg/dL   CBC with Automated Differential    Collection Time: 08/17/18  4:47 AM   Result Value Ref Range    WBC 5.53 3.90 - 12.70 K/uL    RBC 2.49 (L) 4.00 - 5.40 M/uL    Hemoglobin 7.5 (L) 12.0 - 16.0 g/dL    Hematocrit 23.1 (L) 37.0 - 48.5 %    MCV 93 82 - 98 fL    MCH 30.1 27.0 - 31.0 pg    MCHC 32.5 32.0 - 36.0 g/dL    RDW 18.4 (H) 11.5 - 14.5 %    Platelets 179 150 - 350 K/uL    MPV 10.7 9.2 - 12.9 fL    Immature Granulocytes 0.4 0.0 - 0.5 %    Gran # (ANC) 5.4 1.8 - 7.7  K/uL    Immature Grans (Abs) 0.02 0.00 - 0.04 K/uL    Lymph # 0.1 (L) 1.0 - 4.8 K/uL    Mono # 0.0 (L) 0.3 - 1.0 K/uL    Eos # 0.0 0.0 - 0.5 K/uL    Baso # 0.00 0.00 - 0.20 K/uL    nRBC 0 0 /100 WBC    Gran% 97.1 (H) 38.0 - 73.0 %    Lymph% 1.8 (L) 18.0 - 48.0 %    Mono% 0.7 (L) 4.0 - 15.0 %    Eosinophil% 0.0 0.0 - 8.0 %    Basophil% 0.0 0.0 - 1.9 %    Differential Method Automated          Diagnostic Results:  I have reviewed all pertinent imaging results/findings within the past 24 hours.

## 2018-08-17 NOTE — PT/OT/SLP EVAL
"Physical Therapy  Evaluation    Patient Name:  Noreen Mac   MRN:  [id number]    Recommendations:     Discharge Recommendations:  home   Discharge Equipment Recommendations: none   Barriers to discharge: None    Assessment:     Noreen Mac is a 68 y.o. female admitted with a medical diagnosis of Acute monocytic leukemia in remission. Tolerated evaluation well this morning. Ambulates 450 ft independently, no device, wearing mask. Able to hold conversation while ambulating, denies fatigue or SOB. Discussed continuing to mobilize with nsg or family when able. Will keep on PT list for 1x/week to visit to ensure no decrease in mobility/strength/endurance while receiving inpatient chemotherapy, pt in agreement and verbalized understanding.    She presents with the following impairments/functional limitations:  impaired functional mobilty, impaired endurance, weakness.    Rehab Prognosis: Good; patient would benefit from acute skilled PT services to address these deficits and reach maximum level of function.      Recent Surgery: * No surgery found *      Plan:     During this hospitalization, patient to be seen 1 x/week to address the above listed problems via gait training, therapeutic activities, therapeutic exercises  · Plan of Care Expires:  09/16/18   Plan of Care Reviewed with: patient    Subjective     Communicated with MABEL Roberson prior to session.  Patient found supine (HOB elevated) in bed, no family present, upon PT entry to room, agreeable to evaluation.      Chief Complaint: none  Patient comments/goals: "Last time I got chemo, it definitely knocked me back a bit."    Pain/Comfort:  · Pain Rating 1: 0/10  · Pain Rating Post-Intervention 1: 0/10    Patients cultural, spiritual, Congregational conflicts given the current situation: Patient has no barriers to learning. Patient verbalizes understanding of his/her program and goals and demonstrates them correctly. No cultural, spiritual or educational needs " identified.    Living Environment:  Pt lives with spouse in a 1 SH with 1 TAYLOR.    PLOF:  Prior to admission, patients level of function was independent but does state that her spouse will help with bathing ADL's (tub transfers) from time to time. Patient has the following equipment: none.    DME:  Patient has the following equipment: none. DME owned (not currently used): none.  Upon discharge, patient will have assistance from spouse.    Objective:     Patient found with: peripheral IV     General Precautions: Standard, chemotherapy  Orthopedic Precautions:N/A     Exams:  · Cognitive Exam:  Patient is oriented to Person, Place, Time and Situation    · Fine Motor Coordination: Intact    · RLE ROM: WFL  · RLE Strength: WFL  · LLE ROM: WFL  · LLE Strength: WFL    Functional Mobility:    · Bed Mobility:  · Supine to Sit: independence  · Sit to Supine: independence    · Transfers  · Sit to Stand:  independence with no AD x 1 trial from EOB without device    · Gait:  · 450 ft independently, no device, wearing mask. Able to hold conversation while ambulating, denies fatigue or SOB    · Balance:  · Static Sit: independent at EOB  · Static Stand: independent without device    AM-PAC 6 CLICK MOBILITY  Total Score:24     Therapeutic Activities and Exercises:  1. Discussed mobilizing with family once they arrive or with nursing when available, to maintain strength/endurance/mobility levels during this admit. Pt anticipates discharge on  to home.    Patient left supine with all lines intact and RN notified.    GOALS:   Multidisciplinary Problems     Physical Therapy Goals        Problem: Physical Therapy Goal    Goal Priority Disciplines Outcome Goal Variances Interventions   Physical Therapy Goal     PT, PT/OT      Description:  Goals to be met by: 18     Patient will increase functional independence with mobility by performin. Gait  x 1,000 feet with [place] in consecutive PT sessions - Not met                     History:     Past Medical History:   Diagnosis Date    Hypertension        Past Surgical History:   Procedure Laterality Date    CHOLECYSTECTOMY      HYSTERECTOMY      TONSILLECTOMY       Clinical Decision Making:     History  Co-morbidities and personal factors that may impact the plan of care Examination  Body Structures and Functions, activity limitations and participation restrictions that may impact the plan of care Clinical Presentation   Decision Making/ Complexity Score   Co-morbidities:   [] Time since onset of injury / illness / exacerbation  [] Status of current condition  []Patient's cognitive status and safety concerns    [] Multiple Medical Problems (see med hx)  Personal Factors:   [] Patient's age  [] Prior Level of function   [] Patient's home situation (environment and family support)  [] Patient's level of motivation  [] Expected progression of patient      HISTORY:(criteria)    [x] 91922 - no personal factors/history    [] 02855 - has 1-2 personal factor/comorbidity     [] 67887 - has >3 personal factor/comorbidity     Body Regions:  [x] Objective examination findings  [] Head     []  Neck  [] Trunk   [] Upper Extremity  [] Lower Extremity    Body Systems:  [] For communication ability, affect, cognition, language, and learning style: the assessment of the ability to make needs known, consciousness, orientation (person, place, and time), expected emotional /behavioral responses, and learning preferences (eg, learning barriers, education  needs)  [x] For the neuromuscular system: a general assessment of gross coordinated movement (eg, balance, gait, locomotion, transfers, and transitions) and motor function  (motor control and motor learning)  [] For the musculoskeletal system: the assessment of gross symmetry, gross range of motion, gross strength, height, and weight  [] For the integumentary system: the assessment of pliability(texture), presence of scar formation, skin color, and  skin integrity  [] For cardiovascular/pulmonary system: the assessment of heart rate, respiratory rate, blood pressure, and edema     Activity limitations:    [] Patient's cognitive status and saf ety concerns          [] Status of current condition      [] Weight bearing restriction  [] Cardiopulmunary Restriction    Participation Restrictions:   [] Goals and goal agreement with the patient     [] Rehab potential (prognosis) and probable outcome      Examination of Body System: (criteria)    [x] 75488 - addressing 1-2 elements    [] 50685 - addressing a total of 3 or more elements     [] 44162 -  Addressing a total of 4 or more elements         Clinical Presentation: (criteria)  Evolving - 53001     On examination of body system using standardized tests and measures patient presents with 1-2 elements from any of the following: body structures and functions, activity limitations, and/or participation restrictions.  Leading to a clinical presentation that is considered evolving with changing characteristics                              Clinical Decision Making  (Eval Complexity):  Low- 31755     Time Tracking:     PT Received On: 08/17/18  PT Start Time: 0925     PT Stop Time: 0939  PT Total Time (min): 14 min     Billable Minutes: Evaluation 14    Thang Israel, PT  08/17/2018

## 2018-08-17 NOTE — PROGRESS NOTES
Cytarabine currently infusing to left wrist PIV. Chemotherapy consent and Cytarabine verified with two RNs. CAR created and verified with Franchesca HERNANDEZ RN. Labs and drug calculations reviewed. Blood return noted in peripheral line. Patient educated to call for any s/s of complications (ex: rash, dizziness, loss of fine motor function, SOB, any unusual pain, N/V). Patient verbalizes understanding. Call bell within reach. Will continue to monitor.

## 2018-08-17 NOTE — PLAN OF CARE
MDR's with Dr Tong.  Patient is scheduled to complete chemo and d/c on Sunday if stable.  No HH/DME needs.  Patient will receive twice weekly labs at a local lab.  Patient is aware of f/u planned for 8/28.  No other needs anticipated.  Will continue to follow.      Follow-up Information     [facility] .    Why:  Labs- every Monday and Thursday  Contact information:  Address: [address] [place] [address], [place] [address]  Phone: [phone]               Future Appointments   Date Time Provider Department Center   8/28/2018  1:00 PM Radha Agarwal MD [facility] CARDIO [address]   8/28/2018  [facility]   8/28/2018  3:00 PM Laquita Troy MD [facility] HEM ONC [address]   9/4/2018  2:00 PM LAB, [facility] CANCER BLDG [facility]   9/4/2018  3:00 PM Laquita Troy MD [facility] HEM ONC [facility]        08/17/18 1126   Final Note   Assessment Type Final Discharge Note   Discharge Disposition Home   What phone number can be called within the next 1-3 days to see how you are doing after discharge? ([phone])   Hospital Follow Up  Appt(s) scheduled? Yes   Discharge plans and expectations educations in teach back method with documentation complete? Yes

## 2018-08-17 NOTE — PT/OT/SLP EVAL
Occupational Therapy   Evaluation    Name: Noreen Mac  MRN: [id number]  Admitting Diagnosis:  Acute monocytic leukemia in remission      Recommendations:     Discharge Recommendations: home  Discharge Equipment Recommendations:  none  Barriers to discharge:  None    History:     Occupational Profile:  Living Environment: Pt lives in a ssh w/ spouse. No concerns.   Previous level of function: Indep w/ mobility. PRN assist for tub t/f but generally indep w/ ADLs.   Roles and Routines: N/A  Equipment Used at Home:  none  Assistance upon Discharge: pt has assistance upon D/C.     Past Medical History:   Diagnosis Date    Hypertension        Past Surgical History:   Procedure Laterality Date    CHOLECYSTECTOMY      HYSTERECTOMY      TONSILLECTOMY         Subjective     Chief Complaint: No complaints  Patient/Family Comments/goals: return home.     Pain/Comfort:  · Pain Rating 1: 0/10  · Pain Rating Post-Intervention 1: 0/10    Patients cultural, spiritual, Taoist conflicts given the current situation:      Objective:     Communicated with: RN prior to session.  Patient found all lines intact, call button in reach and RN present and PICC line upon OT entry to room.    General Precautions: Standard, fall   Orthopedic Precautions:N/A   Braces: N/A     Occupational Performance:    Bed Mobility:    · Patient completed Scooting/Bridging with independence  · Patient completed Supine to Sit with independence  · Patient completed Sit to Supine with independence    Functional Mobility/Transfers:  · Patient completed Sit <> Stand Transfer with independence  with  no assistive device   · Functional Mobility: Pt ambulated ~450 ft w/ indep.      Activities of Daily Living:  · Lower Body Dressing: independence donned/doffed socks    Cognitive/Visual Perceptual:  Cognitive/Psychosocial Skills:     -       Oriented to: Person, Place, Time and Situation   -       Follows Commands/attention:Follows multistep  commands  -        "Communication: clear/fluent  -       Memory: No Deficits noted  -       Safety awareness/insight to disability: intact   -       Mood/Affect/Coping skills/emotional control: Appropriate to situation  Visual/Perceptual:      -Intact no noted deficits     Physical Exam:  Balance:    -       Pt displayed no deficits for balanc e  Postural examination/scapula alignment:    -       Rounded shoulders  Skin integrity: Visible skin intact  Upper Extremity Range of Motion:     -       Right Upper Extremity: WFL no deficits   -       Left Upper Extremity: WFL no deficits full AROM  Upper Extremity Strength:    -       Right Upper Extremity: WFL  -       Left Upper Extremity: WFL 4/5   Strength:    -       Right Upper Extremity: WFL 4/5  -       Left Upper Extremity: WFL 4/5  Fine Motor Coordination:    -       Intact  Gross motor coordination:   WFL    AMPAC 6 Click ADL:  AMPAC Total Score: 24    Treatment & Education:  Pt educated on POC.   Education:    Patient left HOB elevated with all lines intact, call button in reach and RN notified    Assessment:     Noreen Mac is a 68 y.o. female with a medical diagnosis of Acute monocytic leukemia in remission.impairments listed below. Pt did well to tolerate and participate in session. Pt displayed good overall safety. Pt to be followed by OT to prevent deconditioning. Pt would benefit from skilled OT services to improve independence and overall occupational functioning      She presents with the following performance deficits affecting function: weakness, impaired endurance, impaired functional mobilty.      Rehab Prognosis: Good; patient would benefit from acute skilled OT services to address these deficits and reach maximum level of function.         Clinical Decision Makin.  OT Low:  "Pt evaluation falls under low complexity for evaluation coding due to performance deficits noted in 1-3 areas as stated above and 0 co-morbities affecting current functional " "status. Data obtained from problem focused assessments. No modifications or assistance was required for completion of evaluation. Only brief occupational profile and history review completed."     Plan:     Patient to be seen 1 x/week to address the above listed problems via self-care/home management, therapeutic activities, therapeutic exercises  · Plan of Care Expires:    · Plan of Care Reviewed with: patient    This Plan of care has been discussed with the patient who was involved in its development and understands and is in agreement with the identified goals and treatment plan    GOALS:   Multidisciplinary Problems     Occupational Therapy Goals        Problem: Occupational Therapy Goal    Goal Priority Disciplines Outcome Interventions   Occupational Therapy Goal     OT, PT/OT     Description:  Pt will indep perform ADLs for 3 consecutive sessions.                     Time Tracking:     OT Date of Treatment: 08/17/18  OT Start Time: 0925  OT Stop Time: 0939  OT Total Time (min): 14 min    Billable Minutes:Evaluation 17 minutes    Yonis Oglesby OT  8/17/2018    "

## 2018-08-17 NOTE — PLAN OF CARE
Problem: Patient Care Overview  Goal: Plan of Care Review  Outcome: Ongoing (interventions implemented as appropriate)  Patient remains free from falls and injury this shift. Bed in low, locked position with call light in reach. Day 3 of Hidac regimen chemo completed this AM with no complications. Patient ambulated in rush with PT/OT.  Xanax given for anxiety. Patient refused Metoprolol, with HR of 66, for fear of passing out from previous experience- BMT team notified. Patient voiding spontaneously in bathroom, measuring urine in hat, BM this morning. All belongings within reach.  Will continue to monitor.

## 2018-08-18 LAB
ABO + RH BLD: NORMAL
ALBUMIN SERPL BCP-MCNC: 2.4 G/DL
ALP SERPL-CCNC: 201 U/L
ALT SERPL W/O P-5'-P-CCNC: 32 U/L
ANION GAP SERPL CALC-SCNC: 6 MMOL/L
AST SERPL-CCNC: 27 U/L
BASOPHILS # BLD AUTO: 0 K/UL
BASOPHILS NFR BLD: 0 %
BILIRUB SERPL-MCNC: 0.7 MG/DL
BLD GP AB SCN CELLS X3 SERPL QL: NORMAL
BUN SERPL-MCNC: 12 MG/DL
CALCIUM SERPL-MCNC: 8.8 MG/DL
CHLORIDE SERPL-SCNC: 109 MMOL/L
CO2 SERPL-SCNC: 24 MMOL/L
CREAT SERPL-MCNC: 0.5 MG/DL
DIFFERENTIAL METHOD: ABNORMAL
EOSINOPHIL # BLD AUTO: 0 K/UL
EOSINOPHIL NFR BLD: 0 %
ERYTHROCYTE [DISTWIDTH] IN BLOOD BY AUTOMATED COUNT: 17.6 %
EST. GFR  (AFRICAN AMERICAN): >60 ML/MIN/1.73 M^2
EST. GFR  (NON AFRICAN AMERICAN): >60 ML/MIN/1.73 M^2
GLUCOSE SERPL-MCNC: 92 MG/DL
HCT VFR BLD AUTO: 23.2 %
HGB BLD-MCNC: 7.5 G/DL
IMM GRANULOCYTES # BLD AUTO: 0.02 K/UL
IMM GRANULOCYTES NFR BLD AUTO: 0.5 %
LYMPHOCYTES # BLD AUTO: 0 K/UL
LYMPHOCYTES NFR BLD: 0.8 %
MAGNESIUM SERPL-MCNC: 1.5 MG/DL
MCH RBC QN AUTO: 29.8 PG
MCHC RBC AUTO-ENTMCNC: 32.3 G/DL
MCV RBC AUTO: 92 FL
MONOCYTES # BLD AUTO: 0 K/UL
MONOCYTES NFR BLD: 0.8 %
NEUTROPHILS # BLD AUTO: 3.6 K/UL
NEUTROPHILS NFR BLD: 97.9 %
NRBC BLD-RTO: 0 /100 WBC
PHOSPHATE SERPL-MCNC: 3.1 MG/DL
PLATELET # BLD AUTO: 180 K/UL
PMV BLD AUTO: 10.1 FL
POTASSIUM SERPL-SCNC: 3.9 MMOL/L
PROT SERPL-MCNC: 5 G/DL
RBC # BLD AUTO: 2.52 M/UL
SODIUM SERPL-SCNC: 139 MMOL/L
WBC # BLD AUTO: 3.67 K/UL

## 2018-08-18 PROCEDURE — 85025 COMPLETE CBC W/AUTO DIFF WBC: CPT

## 2018-08-18 PROCEDURE — 86901 BLOOD TYPING SEROLOGIC RH(D): CPT

## 2018-08-18 PROCEDURE — 25000003 PHARM REV CODE 250: Performed by: INTERNAL MEDICINE

## 2018-08-18 PROCEDURE — 83735 ASSAY OF MAGNESIUM: CPT

## 2018-08-18 PROCEDURE — 25000003 PHARM REV CODE 250: Performed by: NURSE PRACTITIONER

## 2018-08-18 PROCEDURE — 84100 ASSAY OF PHOSPHORUS: CPT

## 2018-08-18 PROCEDURE — 20600001 HC STEP DOWN PRIVATE ROOM

## 2018-08-18 PROCEDURE — 36415 COLL VENOUS BLD VENIPUNCTURE: CPT

## 2018-08-18 PROCEDURE — 80053 COMPREHEN METABOLIC PANEL: CPT

## 2018-08-18 PROCEDURE — 99232 SBSQ HOSP IP/OBS MODERATE 35: CPT | Mod: GC,,, | Performed by: INTERNAL MEDICINE

## 2018-08-18 PROCEDURE — 94761 N-INVAS EAR/PLS OXIMETRY MLT: CPT

## 2018-08-18 PROCEDURE — 63600175 PHARM REV CODE 636 W HCPCS: Performed by: INTERNAL MEDICINE

## 2018-08-18 PROCEDURE — 63600175 PHARM REV CODE 636 W HCPCS: Performed by: NURSE PRACTITIONER

## 2018-08-18 RX ADMIN — DEXAMETHASONE 8 MG: 4 TABLET ORAL at 08:08

## 2018-08-18 RX ADMIN — METOPROLOL SUCCINATE 12.5 MG: 25 TABLET, EXTENDED RELEASE ORAL at 09:08

## 2018-08-18 RX ADMIN — MORPHINE SULFATE 15 MG: 15 TABLET ORAL at 02:08

## 2018-08-18 RX ADMIN — ONDANSETRON 8 MG: 4 TABLET, FILM COATED ORAL at 08:08

## 2018-08-18 RX ADMIN — ESCITALOPRAM 10 MG: 5 TABLET, FILM COATED ORAL at 09:08

## 2018-08-18 RX ADMIN — PANTOPRAZOLE SODIUM 40 MG: 40 TABLET, DELAYED RELEASE ORAL at 09:08

## 2018-08-18 RX ADMIN — ACYCLOVIR 400 MG: 200 CAPSULE ORAL at 09:08

## 2018-08-18 RX ADMIN — ONDANSETRON 8 MG: 4 TABLET, FILM COATED ORAL at 05:08

## 2018-08-18 RX ADMIN — DEXAMETHASONE 1 DROP: 1 SUSPENSION OPHTHALMIC at 05:08

## 2018-08-18 RX ADMIN — ENOXAPARIN SODIUM 40 MG: 100 INJECTION SUBCUTANEOUS at 05:08

## 2018-08-18 RX ADMIN — SODIUM CHLORIDE: 0.9 INJECTION, SOLUTION INTRAVENOUS at 05:08

## 2018-08-18 RX ADMIN — CYTARABINE 5040 MG: 100 INJECTION, SOLUTION INTRATHECAL; INTRAVENOUS; SUBCUTANEOUS at 09:08

## 2018-08-18 RX ADMIN — ALPRAZOLAM 0.25 MG: 0.25 TABLET ORAL at 11:08

## 2018-08-18 NOTE — SUBJECTIVE & OBJECTIVE
Subjective:     Interval History: Pt is a 67 yo F with h/o AML FLT3+, NPM1+ s/p therapy with 7+3 with residual disease and then treatment with FLAG Maribel with GABRIEL on day 14 BM biopsy who presented to the hospital for cycle 1 of HiDAC.      The patient is day +5 of HiDAC.  She reports she is doing well.  Patient denies fever, chills, CP, SOB, cough, N/V, constipation, diarrhea    Review of Systems   Constitutional: Negative for chills and fever.   HENT: Negative for congestion.    Respiratory: Negative for cough, sputum production and shortness of breath.    Cardiovascular: Negative for chest pain and leg swelling.   Gastrointestinal: Negative for abdominal pain, constipation, diarrhea, nausea and vomiting.   Neurological: Negative for headaches.       Objective:     Vital Signs (Most Recent):  Temp: 97.2 °F (36.2 °C) (08/18/18 1211)  Pulse: 68 (08/18/18 1211)  Resp: 20 (08/18/18 1211)  BP: 133/69 (08/18/18 1211)  SpO2: 98 % (08/18/18 1211) Vital Signs (24h Range):  Temp:  [97.2 °F (36.2 °C)-98.4 °F (36.9 °C)] 97.2 °F (36.2 °C)  Pulse:  [63-76] 68  Resp:  [16-20] 20  SpO2:  [96 %-100 %] 98 %  BP: (127-160)/(60-70) 133/69     Weight: 66.3 kg (146 lb 2.6 oz)  Body mass index is 26.73 kg/m².  Body surface area is 1.7 meters squared.    ECOG SCORE         [unfilled]    Intake/Output - Last 3 Shifts       08/16 0700 - 08/17 0659 08/17 0700 - 08/18 0659 08/18 0700 - 08/19 0659    P.O. 1720 1655     I.V. (mL/kg) 978.3 (14.7) 1064.8 (16.1)     Blood 282.5      IV Piggyback 250 250     Total Intake(mL/kg) 3230.8 (48.7) 2969.8 (44.8)     Urine (mL/kg/hr) 2300 (1.4) 2625 (1.6)     Stool 0 0     Total Output 2300 2625     Net +930.8 +344.8            Urine Occurrence 5 x 1 x     Stool Occurrence 1 x 1 x           Physical Exam   Constitutional: She is oriented to person, place, and time. She appears well-developed and well-nourished.   HENT:   Alopecia   Eyes: EOM are normal. Right eye exhibits no discharge. Left eye exhibits no  discharge.   Cardiovascular: Normal rate, regular rhythm and normal heart sounds. Exam reveals no gallop and no friction rub.   No murmur heard.  Pulmonary/Chest: Effort normal and breath sounds normal. No respiratory distress. She has no wheezes. She has no rales.   Abdominal: Soft. Bowel sounds are normal. She exhibits no distension. There is no tenderness. There is no rebound and no guarding.   Neurological: She is alert and oriented to person, place, and time.       Significant Labs:   I have reviewed all pertinent imaging results/findings within the past 24 hours.     Diagnostic Results:  I have reviewed all pertinent imaging results/findings within the past 24 hours.

## 2018-08-18 NOTE — PROGRESS NOTES
[facility]  Hematology  Bone Marrow Transplant  Progress Note    Patient Name: Noreen Mac  Admission Date: 8/14/2018  Hospital Length of Stay: 4 days  Code Status: Full Code    Subjective:     Interval History: Pt is a 69 yo F with h/o AML FLT3+, NPM1+ s/p therapy with 7+3 with residual disease and then treatment with FLAG Maribel with GABRIEL on day 14 BM biopsy who presented to the hospital for cycle 1 of HiDAC.      The patient is day +5 of HiDAC.  She reports she is doing well.  Patient denies fever, chills, CP, SOB, cough, N/V, constipation, diarrhea    Review of Systems   Constitutional: Negative for chills and fever.   HENT: Negative for congestion.    Respiratory: Negative for cough, sputum production and shortness of breath.    Cardiovascular: Negative for chest pain and leg swelling.   Gastrointestinal: Negative for abdominal pain, constipation, diarrhea, nausea and vomiting.   Neurological: Negative for headaches.       Objective:     Vital Signs (Most Recent):  Temp: 97.2 °F (36.2 °C) (08/18/18 1211)  Pulse: 68 (08/18/18 1211)  Resp: 20 (08/18/18 1211)  BP: 133/69 (08/18/18 1211)  SpO2: 98 % (08/18/18 1211) Vital Signs (24h Range):  Temp:  [97.2 °F (36.2 °C)-98.4 °F (36.9 °C)] 97.2 °F (36.2 °C)  Pulse:  [63-76] 68  Resp:  [16-20] 20  SpO2:  [96 %-100 %] 98 %  BP: (127-160)/(60-70) 133/69     Weight: 66.3 kg (146 lb 2.6 oz)  Body mass index is 26.73 kg/m².  Body surface area is 1.7 meters squared.    ECOG SCORE         [unfilled]    Intake/Output - Last 3 Shifts       08/16 0700 - 08/17 0659 08/17 0700 - 08/18 0659 08/18 0700 - 08/19 0659    P.O. 1720 1655     I.V. (mL/kg) 978.3 (14.7) 1064.8 (16.1)     Blood 282.5      IV Piggyback 250 250     Total Intake(mL/kg) 3230.8 (48.7) 2969.8 (44.8)     Urine (mL/kg/hr) 2300 (1.4) 2625 (1.6)     Stool 0 0     Total Output 2300 2625     Net +930.8 +344.8            Urine Occurrence 5 x 1 x     Stool Occurrence 1 x 1 x           Physical Exam    Constitutional: She is oriented to person, place, and time. She appears well-developed and well-nourished.   HENT:   Alopecia   Eyes: EOM are normal. Right eye exhibits no discharge. Left eye exhibits no discharge.   Cardiovascular: Normal rate, regular rhythm and normal heart sounds. Exam reveals no gallop and no friction rub.   No murmur heard.  Pulmonary/Chest: Effort normal and breath sounds normal. No respiratory distress. She has no wheezes. She has no rales.   Abdominal: Soft. Bowel sounds are normal. She exhibits no distension. There is no tenderness. There is no rebound and no guarding.   Neurological: She is alert and oriented to person, place, and time.       Significant Labs:   I have reviewed all pertinent imaging results/findings within the past 24 hours.     Diagnostic Results:  I have reviewed all pertinent imaging results/findings within the past 24 hours.      Assessment/Plan:     * Acute monocytic leukemia in remission    - Admitted from [facility] on 5/25/18 early AM with WBC around 100s, for suspected new non-M3 AML  - Lo res HLA typing on 5/26/18 and hi res on 5/27/18 done in anticipation of possible need for future transplant   - Pt with two daughters, two full sisters (in their mid 60s, one with brain aneurysm hx, other one without any medical issues), and one full brother (61 y/o healthy).  - NPM1 +, CEBPA (-), FLT3 (+).  On Midostaurin 50 mg PO BID until Day 14 (held starting 6/8 to 6/11). Stopped when FLAG JENNIFER re-induction started. Restarted Midostaurin at 25 mg bid on day 8 of FLAG JENNIFER induction (6/22), increased to full dose 50 mg bid (6/26) as improvement in liver enzymes. Stopped 7/3/18 due to abnormal cardiac echo.    - Received 7+3, day 14 bone marrow biopsy completed 6/11 showing persistent disease with 15% CD 34 positive blasts; was reinduced with FLAG-JENNIFER, day 14 restaging bone marrow biopsy done 6/29 without complication, results with no evidence of AML, FLT 3 negative  -  Repeat marrow at count recovery 7/31 showed no evidence of AML  - Derm bx of right palm negative for leukemia cutis and sweets  - Admitted on 8/14/18 for planned cycle 1 hidac  -Pt is day +5 of therapy.  -Echo showed normal EF.  Pt can receive Midostaurin 50mg BID during days 8-21 of this consolidation cycle.        Rash of hands    - Rash on hand improved.  - derm performed skin bx  - this was negative for leukemia cutis and sweets syndrome         History of Clostridium difficile infection    - treated with PO vanc 7/25 for 2 week  -Pt completed therapy  - diarrhea improved        Acute back pain    -pain has resolved.  -The patient no longer wants to take the long acting pain medication        Chemotherapy induced cardiomyopathy    - has outpt appt with cards Dr. Agarwal on 8/17/17, will likely need to move appt date pending discharge  -Will monitor weights daily.   -Echo done 8/15/18 showed normal EF and indeterminate LV diastolic dysfunction.  -Previous decline in EF may have been infection or chemotehrapy treatment related        CINV (chemotherapy-induced nausea and vomiting)    - continue home zofran prn and compazine prn        Electrolyte abnormality    -replace per prn BMT electrolyte replacement protocol           Pancytopenia due to chemotherapy    - transfuse for hgb <7 and plt <10K  -hemoglobin 7.5g/dL and plts 179k today  -Will transfuse one unit of PRBC's today 8/16/18.        HTN (hypertension)    - continue home tropol, will d/c if hypotensive        Depression    - continue home lexapro, xanax prn, and ativan prn            VTE Risk Mitigation (From admission, onward)        Ordered     enoxaparin injection 40 mg  Daily      08/14/18 1817     heparin, porcine (PF) 100 unit/mL injection flush 300 Units  As needed (PRN)      08/14/18 1820     IP VTE HIGH RISK PATIENT  Once      08/14/18 1817          Disposition: Continue inpatient treatment    Case discussed with staff.      Main Mayo,  MD  Bone Marrow Transplant  [facility]

## 2018-08-18 NOTE — PLAN OF CARE
Problem: Patient Care Overview  Goal: Plan of Care Review  Outcome: Ongoing (interventions implemented as appropriate)  Plan of care reviewed with the patient at the beginning of the shift. Pt admitted for chemotherapy. Today will be day 5 of HIDAC, she will recieve dose 5 of 6 tonight. Chemo schedule reviewed with pt. Decadron eye drops q 6hr. IVF infusing. She denies n/v/d. Pt with previous C Diff, she is now c diff negative and has completed treatment of oral vanc. She reports daily normal bowel movements. She reports an increase in appetite and PO intake. Fall precautions maintained. Pt states she still feels weak since prior discharge. Pt working with PT and OT. Pt remained free from falls and injury this shift. Bed locked in lowest position, side rails up x2, call light within reach. Instructed pt to call for assistance as needed. Pt verbalized understanding. Vitals stable. Pt remained afebrile. No acute issues overnight. Will continue to monitor.

## 2018-08-18 NOTE — ASSESSMENT & PLAN NOTE
- Admitted from [facility] on 5/25/18 early AM with WBC around 100s, for suspected new non-M3 AML  - Lo res HLA typing on 5/26/18 and hi res on 5/27/18 done in anticipation of possible need for future transplant   - Pt with two daughters, two full sisters (in their mid 60s, one with brain aneurysm hx, other one without any medical issues), and one full brother (61 y/o healthy).  - NPM1 +, CEBPA (-), FLT3 (+).  On Midostaurin 50 mg PO BID until Day 14 (held starting 6/8 to 6/11). Stopped when FLAG JENNIFER re-induction started. Restarted Midostaurin at 25 mg bid on day 8 of FLAG JENNIFER induction (6/22), increased to full dose 50 mg bid (6/26) as improvement in liver enzymes. Stopped 7/3/18 due to abnormal cardiac echo.    - Received 7+3, day 14 bone marrow biopsy completed 6/11 showing persistent disease with 15% CD 34 positive blasts; was reinduced with FLAG-JENNIFER, day 14 restaging bone marrow biopsy done 6/29 without complication, results with no evidence of AML, FLT 3 negative  - Repeat marrow at count recovery 7/31 showed no evidence of AML  - Derm bx of right palm negative for leukemia cutis and sweets  - Admitted on 8/14/18 for planned cycle 1 hidac  -Pt is day +5 of therapy.  -Echo showed normal EF.  Pt can receive Midostaurin 50mg BID during days 8-21 of this consolidation cycle.

## 2018-08-18 NOTE — SUBJECTIVE & OBJECTIVE
Subjective:     Interval History: ***    Objective:     Vital Signs (Most Recent):  Temp: 97.9 °F (36.6 °C) (18 0500)  Pulse: 70 (18 0500)  Resp: 17 (18 0500)  BP: (!) 142/67 (18 0500)  SpO2: 97 % (18 050) Vital Signs (24h Range):  Temp:  [97.4 °F (36.3 °C)-98.4 °F (36.9 °C)] 97.9 °F (36.6 °C)  Pulse:  [60-76] 70  Resp:  [14-20] 17  SpO2:  [96 %-98 %] 97 %  BP: (127-160)/(60-71) 142/67     Weight: 66.3 kg (146 lb 2.6 oz)  Body mass index is 26.73 kg/m².  Body surface area is 1.7 meters squared.    ECOG SCORE         [unfilled]    Intake/Output - Last 3 Shifts        07 -  0659  07 -  0659    P.O. 1720 1655    I.V. (mL/kg) 978.3 (14.7) 1064.8 (16.1)    Blood 282.5     IV Piggyback 250 250    Total Intake(mL/kg) 3230.8 (48.7) 2969.8 (44.8)    Urine (mL/kg/hr) 2300 (1.4) 2625 (1.6)    Stool 0 0    Total Output 2300 2625    Net +930.8 +344.8          Urine Occurrence 5 x 1 x    Stool Occurrence 1 x 1 x          Physical Exam    Significant Labs:   {Select Labs:94556}    Diagnostic Results:  {Select Imagin}

## 2018-08-19 VITALS
HEIGHT: 62 IN | OXYGEN SATURATION: 100 % | SYSTOLIC BLOOD PRESSURE: 136 MMHG | TEMPERATURE: 98 F | WEIGHT: 146.19 LBS | HEART RATE: 57 BPM | BODY MASS INDEX: 26.9 KG/M2 | DIASTOLIC BLOOD PRESSURE: 65 MMHG | RESPIRATION RATE: 20 BRPM

## 2018-08-19 PROBLEM — R79.89 ELEVATED LFTS: Status: ACTIVE | Noted: 2018-08-19

## 2018-08-19 LAB
ALBUMIN SERPL BCP-MCNC: 2.7 G/DL
ALP SERPL-CCNC: 312 U/L
ALT SERPL W/O P-5'-P-CCNC: 210 U/L
ANION GAP SERPL CALC-SCNC: 7 MMOL/L
AST SERPL-CCNC: 379 U/L
BASOPHILS # BLD AUTO: ABNORMAL K/UL
BASOPHILS NFR BLD: 0 %
BILIRUB SERPL-MCNC: 1.9 MG/DL
BUN SERPL-MCNC: 11 MG/DL
CALCIUM SERPL-MCNC: 8.9 MG/DL
CHLORIDE SERPL-SCNC: 103 MMOL/L
CO2 SERPL-SCNC: 28 MMOL/L
CREAT SERPL-MCNC: 0.6 MG/DL
DIFFERENTIAL METHOD: ABNORMAL
EOSINOPHIL # BLD AUTO: ABNORMAL K/UL
EOSINOPHIL NFR BLD: 0 %
ERYTHROCYTE [DISTWIDTH] IN BLOOD BY AUTOMATED COUNT: 16.8 %
EST. GFR  (AFRICAN AMERICAN): >60 ML/MIN/1.73 M^2
EST. GFR  (NON AFRICAN AMERICAN): >60 ML/MIN/1.73 M^2
GLUCOSE SERPL-MCNC: 116 MG/DL
HCT VFR BLD AUTO: 23.5 %
HGB BLD-MCNC: 7.7 G/DL
IMM GRANULOCYTES # BLD AUTO: ABNORMAL K/UL
IMM GRANULOCYTES NFR BLD AUTO: ABNORMAL %
LYMPHOCYTES # BLD AUTO: ABNORMAL K/UL
LYMPHOCYTES NFR BLD: 0 %
MAGNESIUM SERPL-MCNC: 1.4 MG/DL
MCH RBC QN AUTO: 29.8 PG
MCHC RBC AUTO-ENTMCNC: 32.8 G/DL
MCV RBC AUTO: 91 FL
MONOCYTES # BLD AUTO: ABNORMAL K/UL
MONOCYTES NFR BLD: 0 %
NEUTROPHILS NFR BLD: 100 %
NRBC BLD-RTO: 0 /100 WBC
PHOSPHATE SERPL-MCNC: 4.5 MG/DL
PLATELET # BLD AUTO: 152 K/UL
PLATELET BLD QL SMEAR: ABNORMAL
PMV BLD AUTO: 10.4 FL
POTASSIUM SERPL-SCNC: 3.8 MMOL/L
PROT SERPL-MCNC: 5.5 G/DL
RBC # BLD AUTO: 2.58 M/UL
SODIUM SERPL-SCNC: 138 MMOL/L
WBC # BLD AUTO: 1.51 K/UL

## 2018-08-19 PROCEDURE — 80053 COMPREHEN METABOLIC PANEL: CPT

## 2018-08-19 PROCEDURE — 84100 ASSAY OF PHOSPHORUS: CPT

## 2018-08-19 PROCEDURE — 25000003 PHARM REV CODE 250: Performed by: INTERNAL MEDICINE

## 2018-08-19 PROCEDURE — 99238 HOSP IP/OBS DSCHRG MGMT 30/<: CPT | Mod: GC,,, | Performed by: INTERNAL MEDICINE

## 2018-08-19 PROCEDURE — 63600175 PHARM REV CODE 636 W HCPCS: Performed by: INTERNAL MEDICINE

## 2018-08-19 PROCEDURE — 36415 COLL VENOUS BLD VENIPUNCTURE: CPT

## 2018-08-19 PROCEDURE — 85027 COMPLETE CBC AUTOMATED: CPT

## 2018-08-19 PROCEDURE — 85007 BL SMEAR W/DIFF WBC COUNT: CPT

## 2018-08-19 PROCEDURE — 63600175 PHARM REV CODE 636 W HCPCS: Performed by: STUDENT IN AN ORGANIZED HEALTH CARE EDUCATION/TRAINING PROGRAM

## 2018-08-19 PROCEDURE — 25000003 PHARM REV CODE 250: Performed by: NURSE PRACTITIONER

## 2018-08-19 PROCEDURE — 83735 ASSAY OF MAGNESIUM: CPT

## 2018-08-19 RX ORDER — DEXAMETHASONE SODIUM PHOSPHATE 1 MG/ML
1 SOLUTION/ DROPS OPHTHALMIC EVERY 6 HOURS
Start: 2018-08-19 | End: 2018-08-22

## 2018-08-19 RX ORDER — PROCHLORPERAZINE EDISYLATE 5 MG/ML
10 INJECTION INTRAMUSCULAR; INTRAVENOUS EVERY 6 HOURS PRN
Status: DISCONTINUED | OUTPATIENT
Start: 2018-08-19 | End: 2018-08-19 | Stop reason: HOSPADM

## 2018-08-19 RX ORDER — METOPROLOL SUCCINATE 25 MG/1
12.5 TABLET, EXTENDED RELEASE ORAL DAILY
Qty: 30 TABLET | Refills: 3
Start: 2018-08-19 | End: 2018-08-24 | Stop reason: SDUPTHER

## 2018-08-19 RX ORDER — ONDANSETRON 2 MG/ML
8 INJECTION INTRAMUSCULAR; INTRAVENOUS EVERY 12 HOURS PRN
Status: DISCONTINUED | OUTPATIENT
Start: 2018-08-19 | End: 2018-08-19 | Stop reason: HOSPADM

## 2018-08-19 RX ADMIN — LORAZEPAM 0.5 MG: 0.5 TABLET ORAL at 09:08

## 2018-08-19 RX ADMIN — DEXAMETHASONE 1 DROP: 1 SUSPENSION OPHTHALMIC at 12:08

## 2018-08-19 RX ADMIN — ACYCLOVIR 400 MG: 200 CAPSULE ORAL at 09:08

## 2018-08-19 RX ADMIN — ONDANSETRON 8 MG: 4 TABLET, FILM COATED ORAL at 06:08

## 2018-08-19 RX ADMIN — METOPROLOL SUCCINATE 12.5 MG: 25 TABLET, EXTENDED RELEASE ORAL at 09:08

## 2018-08-19 RX ADMIN — CYTARABINE 5040 MG: 100 INJECTION, SOLUTION INTRATHECAL; INTRAVENOUS; SUBCUTANEOUS at 09:08

## 2018-08-19 RX ADMIN — DEXAMETHASONE 1 DROP: 1 SUSPENSION OPHTHALMIC at 06:08

## 2018-08-19 RX ADMIN — DEXAMETHASONE 8 MG: 4 TABLET ORAL at 08:08

## 2018-08-19 RX ADMIN — ESCITALOPRAM 10 MG: 5 TABLET, FILM COATED ORAL at 09:08

## 2018-08-19 RX ADMIN — ONDANSETRON 8 MG: 2 INJECTION INTRAMUSCULAR; INTRAVENOUS at 08:08

## 2018-08-19 RX ADMIN — PANTOPRAZOLE SODIUM 40 MG: 40 TABLET, DELAYED RELEASE ORAL at 09:08

## 2018-08-19 NOTE — PROGRESS NOTES
Cytarabine currently infusing to left wrist PIV. Chemotherapy consent and Cytarabine verified with two RNs. CAR created and verified with Ruma BETANCOURT RN. Labs and drug calculations reviewed. Blood return noted in peripheral line. Patient educated to call for any s/s of complications (ex: rash, dizziness, loss of fine motor function, SOB, any unusual pain, N/V). Patient verbalizes understanding. Call bell within reach. Will continue to monitor.

## 2018-08-19 NOTE — PROGRESS NOTES
[facility]  Hematology  Bone Marrow Transplant  Progress Note    Patient Name: Noreen Mac  Admission Date: 8/14/2018  Hospital Length of Stay: 5 days  Code Status: Full Code    Subjective:     Interval History: Pt is a 69 yo F with h/o AML FLT3+, NPM1+ s/p therapy with 7+3 with residual disease and then treatment with FLAG Maribel with GABRIEL on day 14 BM biopsy who presented to the hospital for cycle 1 of HiDAC.      The patient is day +5 of HiDAC.  She reports nausea overnight with minimal improvement with Zofran.  Patient denies fever, chills, CP, SOB, cough, N/V, constipation, diarrhea/  She looks forward to possible discharge today pending improvement in her nausea.       Objective:     Vital Signs (Most Recent):  Temp: 98.2 °F (36.8 °C) (08/19/18 0426)  Pulse: 72 (08/19/18 0822)  Resp: (!) 22 (08/19/18 0822)  BP: 137/79 (08/19/18 0822)  SpO2: 99 % (08/19/18 0822) Vital Signs (24h Range):  Temp:  [96.5 °F (35.8 °C)-98.2 °F (36.8 °C)] 98.2 °F (36.8 °C)  Pulse:  [58-72] 72  Resp:  [16-22] 22  SpO2:  [94 %-99 %] 99 %  BP: (118-137)/(56-94) 137/79     Weight: 66.3 kg (146 lb 2.6 oz)  Body mass index is 26.73 kg/m².  Body surface area is 1.7 meters squared.    ECOG SCORE         [unfilled]    Intake/Output - Last 3 Shifts       08/17 0700 - 08/18 0659 08/18 0700 - 08/19 0659 08/19 0700 - 08/20 0659    P.O. 1655 300     I.V. (mL/kg) 1064.8 (16.1)      Blood       IV Piggyback 250      Total Intake(mL/kg) 2969.8 (44.8) 300 (4.5)     Urine (mL/kg/hr) 2625 (1.6) 2250 (1.4) 600 (2.5)    Stool 0      Total Output 2625 2250 600    Net +344.8 -1950 -600           Urine Occurrence 1 x      Stool Occurrence 1 x            Physical Exam   Constitutional: She is oriented to person, place, and time. She appears well-developed and well-nourished.   HENT:   Alopecia   Eyes: EOM are normal. Right eye exhibits no discharge. Left eye exhibits no discharge.   Cardiovascular: Normal rate, regular rhythm and normal  heart sounds. Exam reveals no gallop and no friction rub.   No murmur heard.  Pulmonary/Chest: Effort normal and breath sounds normal. No respiratory distress. She has no wheezes. She has no rales.   Abdominal: Soft. Bowel sounds are normal. She exhibits no distension. There is no tenderness. There is no rebound and no guarding.   Neurological: She is alert and oriented to person, place, and time.       Significant Labs:   I have reviewed all pertinent imaging results/findings within the past 24 hours.     Diagnostic Results:  I have reviewed all pertinent imaging results/findings within the past 24 hours.      Assessment/Plan:     * Acute monocytic leukemia in remission    - Admitted from [facility] on 5/25/18 early AM with WBC around 100s, for suspected new non-M3 AML  - Lo res HLA typing on 5/26/18 and hi res on 5/27/18 done in anticipation of possible need for future transplant   - Pt with two daughters, two full sisters (in their mid 60s, one with brain aneurysm hx, other one without any medical issues), and one full brother (61 y/o healthy).  - NPM1 +, CEBPA (-), FLT3 (+).  On Midostaurin 50 mg PO BID until Day 14 (held starting 6/8 to 6/11). Stopped when FLAG JENNIFER re-induction started. Restarted Midostaurin at 25 mg bid on day 8 of FLAG JENNIFER induction (6/22), increased to full dose 50 mg bid (6/26) as improvement in liver enzymes. Stopped 7/3/18 due to abnormal cardiac echo.    - Received 7+3, day 14 bone marrow biopsy completed 6/11 showing persistent disease with 15% CD 34 positive blasts; was reinduced with FLAG-JENNIFER, day 14 restaging bone marrow biopsy done 6/29 without complication, results with no evidence of AML, FLT 3 negative  - Repeat marrow at count recovery 7/31 showed no evidence of AML  - Derm bx of right palm negative for leukemia cutis and sweets  - Admitted on 8/14/18 for planned cycle 1 hidac  -Pt is day +6 of therapy.  -Echo showed normal EF.  Pt can receive Midostaurin 50mg BID during  days 8-21 of this consolidation cycle.  Will discuss holding given elevated LFTs        Elevated LFTs    AST/ALT elevated this morning.  Plan to monitor closely with labs on discharge.  Patient has no gallbladder and denies abdominal pain.        Rash of hands    - Rash on hand improved.  - derm performed skin bx  - this was negative for leukemia cutis and sweets syndrome         History of Clostridium difficile infection    - treated with PO vanc 7/25 for 2 week  -Pt completed therapy  - diarrhea improved        Acute back pain    -pain has resolved.  -The patient no longer wants to take the long acting pain medication        Chemotherapy induced cardiomyopathy    - has outpt appt with cards Dr. Agarwal on 8/17/17, will likely need to move appt date pending discharge  -Will monitor weights daily.   -Echo done 8/15/18 showed normal EF and indeterminate LV diastolic dysfunction.  -Previous decline in EF may have been infection or chemotehrapy treatment related        CINV (chemotherapy-induced nausea and vomiting)    - continue home zofran prn and compazine prn        Electrolyte abnormality    -replace per prn BMT electrolyte replacement protocol           Pancytopenia due to chemotherapy    - transfuse for hgb <7 and plt <10K  -hemoglobin 7.5g/dL and plts 179k today  -Will transfuse one unit of PRBC's today 8/16/18.        HTN (hypertension)    - continue home tropol, will d/c if hypotensive        Depression    - continue home lexapro, xanax prn, and ativan prn            VTE Risk Mitigation (From admission, onward)        Ordered     enoxaparin injection 40 mg  Daily      08/14/18 1817     heparin, porcine (PF) 100 unit/mL injection flush 300 Units  As needed (PRN)      08/14/18 1820     IP VTE HIGH RISK PATIENT  Once      08/14/18 1817          Disposition: Discharge home on prophylaxis with close labs and follow up.      Main Mayo MD  Bone Marrow Transplant  [facility]

## 2018-08-19 NOTE — DISCHARGE SUMMARY
[facility]  Hematology  Bone Marrow Transplant  Discharge Summary      Patient Name: Noreen Mac  MRN: [id number]  Admission Date: 8/14/2018  Hospital Length of Stay: 5 days  Discharge Date and Time: 8/19/2018  2:34 PM  Attending Physician: Main Tong MD   Discharging Provider: Main Mayo MD  Primary Care Provider: Primary Doctor No    HPI: Noreen Mac is a 68 y.o. lady with AML, nausea and vomiting 2/2 chemotherapy, chemotherapy induced cardiomyopathy, mood disorder, and essential hypertension who presents to [facility] for treatment of AML.  She is otherwise doing well without significant complaints at this time.      Last Oncology clinic note on 8/13/2018    Oncology History:   68 y.o. female admitted overnight for possible new AML. Came in from OSH with elevated WBC of 100.   05/25/2018: Pt is now on hydrea 2 grams BID, allopurinol 300 mg daily, and IVF. Pt may need rasburicase this weekend. She will undergo a BM bx and aspiration today. She is an induction candidate and will not be screened for research trials. She has anxiety for which she usually takes xanax, this was re-started.   05/26/2018 PICC placed. Reports she slept well last night. Anxiety improved with prn xanax. EF 65%, HIV and Hep panel negative. 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  6/12/2018: Day 15 of 7+3 induction, restaging BM bx done yesterday. On Midostaurin. Fever yesterday and BC with gram + cocci in clusters. On cefepime day 2 and will start Vanc today. Labial mucositis improving.   6/13/2018: Day 16 7+3. BC with staph aureus, identification pending. Surveillance blood cultures done today. Afebrile x 48 hours. On cefepime and Vanc. Labial mucositis resolved. No complaints of pain. Nausea controlled. No diarrhea. Primary complaint is fatigue.   6/14/20018: Day 17 of 7+3. Day 14 bone marrow results pending. BC with staph epidermis only sensitive to Vancomycin. Vanc day 3 and cefepime day 4. Vanc  trough 12.2 last night. BP remains low but stable. Afebrile x 72 hours.   6/15/2018: Day 18 of 7+3. Day 14 bone marrow biopsy shows persistent disease with 15% blasts. Discussion with patient regarding treatment and she is deciding if she wants to proceed with re-induction vs outpatient maintenance. Temp of 100.4 overnight, unsustained. Day 5 Cefepime and Day 4 of Vanc for staph epidermis. Repeat cultures NGTD. BP improved. Electrolytes (mag, phos, K and calcium) replaced aggressively this am and will repeat labs this afternoon. No complaints this am.   06/16/2018: Day 19 of 7+3. Day 2 of Flag-JENNIFER. Remains afebrile. Calcium remains low and have increased PO supplementation. Remains on vanc and aztreonam. Somewhat loopy feeling after receiving benadryl.   06/17/2018: Day 20 of 7+3. Day 3 of FLAG-JENNIFER. Multiple electrolyte abnormalities. New bilateral leg and feet swelling.   6/18/2018: Day 21 of 7+3 and Day 4 of FLAG JENNIFER. Tolerating well with some nausea controlled with Zofran. VSS, afebrile. ANC 1900 on Neupogen. Continues Vanc for staph epi bacteremia. Multiple electrolytes replaced today. Complains of edema to lower extremities, not improved after 20 of lasix yesterday. No sob or CP.   6/19/18: Day 22 of 7+3 and Day 5 of FLAG JENNIFER. VSS, afebrile, ANC 3900. Vanc trough elevated and dose adjusted this am. Lower extremity edema improved after 40 of lasix yesterday, net negative 300 cc I&O. Mild nausea, no abdominal pain or diarrhea. Poor appetite but no difficulty eating or taking pills.   6/20/18: Day 23 of 7+3 and Day 6 of FLAG JENNIFER. Patient complains of nausea and vomiting overnight and this am, especially when taking pills. Will add Zyprexa daily in addition to Zofran, compazine, and ativan PRN and will change meds to IV. 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 Afebrile.  No diarrhea, mouth sores or pain.  06/21/2018: Day 24 of 7+3 and Day 7 of  FLAG JENNIFER. Nausea better controlled. Continued on Vanc.  6/22/2018: Day 25 of 7+3 and Day 8 of FLAG JENNIFER. Nausea improved some with Zyprexa but did have 1 episode of emesis overnight.continuing meds IV due to vomiting. Remains afebrile. ANC 0. Continues Vanc and Flagyl. Electrolytes replaced.   06/23/2018 : day 26 of 7+3 and Day 9 of FLAG JENNIFER.  Nausea persists, worsened after taking pills so meds converted to IV.  Encouraged ambulation.  Continue with flagyl for leptotrichia goodfellowii.  RUQ US showed intrahepatic dilation, overall impression hepatic steatosis.  CBD 0.5cm.  No Gallbladder.    06/24/2018 : day 27 of 7+3 and Day 10 of FLAG JENNIFER.  Nausea persisting, able to tolerate some po pills.  Last day of flagyl (day 5).  Still pancytopenic. Appetite still low as po intake triggers nausea.  06/25/2018: day 28 of 7+3 and Day 11 of FLAG JENNIFER. Afebrile, ANC 0. Has completed Flagyl. Will decrease Vanc to 1000 mg q12, trough 19.9, will complete Vanc on 6/27. Liver enzymes stable on Midostaurin. VSS.   6/26/2018: day 29 of 7+3 and day 12 of FLAG JENNIFER. Liver enzymes improved and Midostaurin increased to full dose 50 mg bid. Nausea controlled, afebrile, VSS, NEON.  6/27/2018: day 30 of 7+3 and Day 13 of FLAG JENNIFER. Persistent nausea and emesis x 1 yesterday. Compazine changed to scheduled. Vanc ends today. Afebrile, VSS. Aggressive electrolyte replacement, low electrolytes possibly due to Midostaurin.   6/28/2018: day 31 of 7+3 and Day 14 of FLAG JENNIFER. Nausea improved with scheduled Compazine. Patient has agreed to start converting some IV meds to po. VSS, afebrile, electrolytes wnl today. Only complains of fatigue, new rash noted to upper back and chest and legs, papular rash mildly red.  6/29/2018: Day 32 of 7+3 and Day 15 of FLAG JENNIFER. Day 14 restaging bone marrow biopsy done at bedside today. Patient states nausea improved but she did have emesis last night after taking 9 pm pills. Rash improved. No mouth sores, diarrhea or  pain.   7/2/2018: Day 35 of 7+3 and Day 18 of FLAG JENNIFER. Restaging BM bx results pending. Fluid overload over the weekend. Chest x-ray with pulmonary edema. Os sats down to 88%. Placed on O2 at 2 L NC, off O2 this am. Received lasix. Net negative 2.7 L today. 1 episode of nausea, no emesis. Reports anxiety relieved with PRN meds. Electrolytes improved, afebrile, VSS.   7/3/2018: Day 36 of 7+3 and Day 19 of FLAG JENNIFER. Restaging Bm bx with GABRIEL. Cardiology consulted for abnormal echo, EF 30% with pulmonary HTN and severe L atrial enlargement. EKG with T wave abnormality, possible anterolateral ischemia and prolonged QTc of 530. Medications adjusted. Nausea controlled, no diarrhea. Electrolytes improved. On O2 as needed.   07/04/2018 : Day 37 of 7+3 and Day 20 of FLAG JENNIFER Diarrhea in AM, watery, no associated abdominal pain, nausea, or vomiting.    07/05/2018: Day 38 of 7+3 ane Day 21 of FLAG JENNIFER. No CP or SOB, cardiology following. On RA. All meds changed to po, denies nausea or vomiting and no diarrhea. VSS, afebrile.   7/6/2018: Day 22 of FLAG JENNIFER. Continues to tolerate po meds with no nausea. Afebrile. Awaiting count recovery for discharge.  7/7/2018-/8/2018: Day 40/41 of 7+3, Day 23/24 of FLAG JENNIFER.  Improving clinically.  Started on mag oxide per patient request.  Labs showing signs of ANC recovery.  7/9/2018: Day 25 FLAG JENNIFER, , continues Neupogen. Received platelets today. Afebrile, VSS. Tolerating all oral meds with no nausea or vomiting. Only complains of fatigue.   7/10/2018: Day 26 FLAG JENNIFER,  today. Had 2 episodes of vomiting and diarrhea last night. Feeling better. Afebrile, VSS.   7/11/2018: Day 27 FLAG JENNIFER,  today. No vomiting or diarrhea overnight and no nausea. Afebrile, VSS. Complains of back pain (bone pain) suspect due to count recovery. Relieved with oxy IR and Zyrtec started.  07/12/2018: Day 28 flag jennifer, engrafted today with ANC of 730. Back pain improved with zyrtec. Scheduled  for IT chemo tomorrow at 1:30 pm and discharge home thereafter. Will likely need plt transfusion prior to IT chemo tomorrow   7/13/2018: Day 29 of FLAG JENNIFER. ANC up to 1300 today. Transfusing platelets today prior to LP for IT chemo. Patient continues to complain of bone pain, mostly to back and legs. No nausea, diarrhea, sob. Afebrile and VSS.   --hospitalized 7/23-7/25 for C.diff diarrhea, neutropenic fever.  While inpatient she developed pink blisters on her palm that were concerning for relapse.     Interval History:   Ms. Mac was hospitalized for neutropenic fever since her last clinic visit. She feels better as the diarrhea has slowed garrett to 1-2 times a day. Began PO vanc on 7/25 and will complete a 2 week course. The rib pain has resolved. Lesions on her palms remains. They do no itch and are not painful.     Her overarching complaint is unrelenting nausea, not well controlled with zofran.        * No surgery found *     Hospital Course: Patient was admitted HIDAC therapy.  She tolerated the chemotherapy well and was supportive with electrolyte repletion, transfusion, and anti emetics PRN.  On day 6 she was deemed stable for discharge.  Her hospitalization was complicated nausea and vomiting in addition to elevated LFTs that were attribtued to her chemotherapy.  She will be discharged with close follow up and next day labs.  She was instructed to hold her midostaurin until instructed to continue by her oncologist pending repeat LFTs.          Significant Diagnostic Studies: For further detail please see epic charting     Pending Diagnostic Studies:     None        Final Active Diagnoses:    Diagnosis Date Noted POA    PRINCIPAL PROBLEM:  Acute monocytic leukemia in remission [C93.01] 05/24/2018 Yes    Elevated LFTs [R79.89] 08/19/2018 Unknown    AML (acute myeloblastic leukemia) [C92.00] 08/14/2018 Yes    Rash of hands [R21] 07/31/2018 Yes    History of Clostridium difficile infection [Z86.19]  07/23/2018 Yes    Acute back pain [M54.9] 07/11/2018 Yes    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, T45.1X5A] 07/03/2018 Yes    CINV (chemotherapy-induced nausea and vomiting) [R11.2, T45.1X5A] 06/21/2018 Yes    Pancytopenia due to chemotherapy [D61.810] 06/13/2018 Yes    Electrolyte abnormality [E87.8] 06/08/2018 Yes    Depression [F32.9] 05/25/2018 Yes    HTN (hypertension) [I10] 05/25/2018 Yes      Problems Resolved During this Admission:      Discharged Condition: good    Disposition:     Follow Up:  Follow-up Information     [facility] .    Why:  Labs- every Monday and Thursday  Contact information:  Address: [address] [place] [address], [place] [address]  Phone: [phone]               Patient Instructions:      Notify your health care provider if you experience any of the following:  persistent nausea and vomiting or diarrhea     Notify your health care provider if you experience any of the following:  temperature >100.4     Notify your health care provider if you experience any of the following:  difficulty breathing or increased cough     Notify your health care provider if you experience any of the following:  persistent dizziness, light-headedness, or visual disturbances     Activity as tolerated     Medications:  Reconciled Home Medications:      Medication List      START taking these medications    RYDAPT 25 mg Cap  Generic drug:  midostaurin  Take 50 mg by mouth 2 (two) times daily on days 8 to 21 of cycle        CONTINUE taking these medications    acyclovir 200 MG capsule  Commonly known as:  ZOVIRAX  Take 2 capsules (400 mg total) by mouth 2 (two) times daily.     ALPRAZolam 0.25 MG tablet  Commonly known as:  XANAX  Take 1 tablet (0.25 mg total) by mouth 3 (three) times daily as needed for Anxiety.     cetirizine 10 MG tablet  Commonly known as:  ZYRTEC  Take 1 tablet (10 mg total) by mouth once daily for 10 days     escitalopram oxalate 10 MG tablet  Commonly known as:   LEXAPRO  Take 10 mg by mouth once daily.     fluconazole 200 MG Tab  Commonly known as:  DIFLUCAN  Take 2 tablets (400 mg total) by mouth once daily.     hydrocortisone 2.5 % cream  Apply topically 2 (two) times daily. Apply to affected area on palms for 10 days     LORazepam 0.5 MG tablet  Commonly known as:  ATIVAN  Take 1 tablet (0.5 mg total) by mouth every 6 (six) hours as needed (nausea).     magnesium oxide 400 mg tablet  Commonly known as:  MAG-OX  Take 2 tablets (800 mg total) by mouth 2 (two) times daily.     * morphine 15 MG tablet  Commonly known as:  MSIR  Take 1 tablet (15 mg total) by mouth every 4 (four) hours as needed.     * morphine 15 MG 12 hr tablet  Commonly known as:  MS CONTIN  Take 2 tablets (30 mg total) by mouth 2 (two) times daily.     omeprazole 20 MG tablet  Commonly known as:  PRILOSEC OTC  Take 20 mg by mouth every morning.     ondansetron 8 MG tablet  Commonly known as:  ZOFRAN  Take 1 tablet (8 mg total) by mouth every 12 (twelve) hours as needed for Nausea.     prochlorperazine 10 MG tablet  Commonly known as:  COMPAZINE  Take 1 tablet (10 mg total) by mouth 4 (four) times daily.     vitamin D 1000 units Tab  Take 1 tablet (1,000 Units total) by mouth once daily.         * This list has 2 medication(s) that are the same as other medications prescribed for you. Read the directions carefully, and ask your doctor or other care provider to review them with you.            STOP taking these medications    metoprolol succinate 25 MG 24 hr tablet  Commonly known as:  TOPROL-XL        ASK your doctor about these medications    dexamethasone 0.1 % ophthalmic solution  Commonly known as:  DECADRON  Place 1 drop into both eyes every 6 (six) hours. for 3 days  Ask about: Should I take this medication?            Main Mayo MD  Bone Marrow Transplant  [facility]

## 2018-08-19 NOTE — ASSESSMENT & PLAN NOTE
- Admitted from [facility] on 5/25/18 early AM with WBC around 100s, for suspected new non-M3 AML  - Lo res HLA typing on 5/26/18 and hi res on 5/27/18 done in anticipation of possible need for future transplant   - Pt with two daughters, two full sisters (in their mid 60s, one with brain aneurysm hx, other one without any medical issues), and one full brother (59 y/o healthy).  - NPM1 +, CEBPA (-), FLT3 (+).  On Midostaurin 50 mg PO BID until Day 14 (held starting 6/8 to 6/11). Stopped when FLAG JENNIFER re-induction started. Restarted Midostaurin at 25 mg bid on day 8 of FLAG JENNIFER induction (6/22), increased to full dose 50 mg bid (6/26) as improvement in liver enzymes. Stopped 7/3/18 due to abnormal cardiac echo.    - Received 7+3, day 14 bone marrow biopsy completed 6/11 showing persistent disease with 15% CD 34 positive blasts; was reinduced with FLAG-JENNIFER, day 14 restaging bone marrow biopsy done 6/29 without complication, results with no evidence of AML, FLT 3 negative  - Repeat marrow at count recovery 7/31 showed no evidence of AML  - Derm bx of right palm negative for leukemia cutis and sweets  - Admitted on 8/14/18 for planned cycle 1 hidac  -Pt is day +6 of therapy.  -Echo showed normal EF.  Pt can receive Midostaurin 50mg BID during days 8-21 of this consolidation cycle.  Will discuss holding given elevated LFTs

## 2018-08-19 NOTE — DISCHARGE INSTRUCTIONS
Please continue taking Dexamethasone eye drops every 6 hours for the next 72 hours. Stop taking on 8/22 at noon.

## 2018-08-19 NOTE — NURSING
ROAD TEST:  Oxygen: Room air  Ambulates: Independently   Discontinued: PIV on left wrist removed with catheter tip intact    Tolerating: Regular diet, one nausea episode this morning, resolved with Zofran  Elimination: Voids spontaneously in bathroom, BM today   ADLs: Performs independently   Teaching: An informational handout was provided to the patient which included instructions that addressed activity level, diet, discharge medications, and follow-up appointments.  Discussed all discharge instructions and medication education with the patient. Patient instructed to continue dexamethasone eye drops every 6 hours for 72 hours. Patient verbalizes understanding of all information given. Patient states she has no further questions. Patient leaving with  via wheelchair.

## 2018-08-19 NOTE — ASSESSMENT & PLAN NOTE
AST/ALT elevated this morning.  Plan to monitor closely with labs on discharge.  Patient has no gallbladder and denies abdominal pain.

## 2018-08-19 NOTE — PLAN OF CARE
Problem: Patient Care Overview  Goal: Plan of Care Review  Outcome: Ongoing (interventions implemented as appropriate)  Pt c/o nausea at beginning of shift. Gave PRN Zofran. Ambulates to toilet independently. Received Cytarabine infusion. Frequent rounds made to assess pain and safety. Patient remained free from falls. Bed in lowest position. Side rails up X 2. Call light within reach. Will valarie

## 2018-08-20 ENCOUNTER — PATIENT MESSAGE (OUTPATIENT)
Dept: HEMATOLOGY/ONCOLOGY | Facility: CLINIC | Age: 68
End: 2018-08-20

## 2018-08-20 ENCOUNTER — DOCUMENTATION ONLY (OUTPATIENT)
Dept: HEMATOLOGY/ONCOLOGY | Facility: CLINIC | Age: 68
End: 2018-08-20

## 2018-08-21 ENCOUNTER — TELEPHONE (OUTPATIENT)
Dept: HEMATOLOGY/ONCOLOGY | Facility: CLINIC | Age: 68
End: 2018-08-21

## 2018-08-21 NOTE — TELEPHONE ENCOUNTER
I called Mrs. Mac and spoke to her daughter Trish.  We discussed yesterday lab results. Based on the significant bump in LFTs we will hold midostaurin therapy until her AST and ALT return closer to baseline.     I will follow up with them on Thursday regarding new lab results and plan to see them in clinic on 8/28.

## 2018-08-23 ENCOUNTER — PATIENT MESSAGE (OUTPATIENT)
Dept: CARDIOLOGY | Facility: CLINIC | Age: 68
End: 2018-08-23

## 2018-08-23 ENCOUNTER — PATIENT MESSAGE (OUTPATIENT)
Dept: HEMATOLOGY/ONCOLOGY | Facility: CLINIC | Age: 68
End: 2018-08-23

## 2018-08-23 DIAGNOSIS — C92.00 ACUTE MYELOID LEUKEMIA NOT HAVING ACHIEVED REMISSION: ICD-10-CM

## 2018-08-23 RX ORDER — CIPROFLOXACIN 500 MG/1
500 TABLET ORAL EVERY 12 HOURS
Qty: 60 TABLET | Refills: 3 | Status: SHIPPED | OUTPATIENT
Start: 2018-08-23 | End: 2019-02-04 | Stop reason: SDUPTHER

## 2018-08-24 DIAGNOSIS — I10 ESSENTIAL HYPERTENSION: Primary | ICD-10-CM

## 2018-08-24 RX ORDER — METOPROLOL SUCCINATE 25 MG/1
12.5 TABLET, EXTENDED RELEASE ORAL DAILY
Qty: 30 TABLET | Refills: 3
Start: 2018-08-24 | End: 2018-08-28 | Stop reason: SDUPTHER

## 2018-08-25 NOTE — ASSESSMENT & PLAN NOTE
- Admitted from [facility] on 5/25/18 early AM with WBC around 100s, for suspected new non-M3 AML  - Lo res HLA typing on 5/26/18 and hi res on 5/27/18 done in anticipation of possible need for future transplant   - Pt with two daughters, two full sisters (in their mid 60s, one with brain aneurysm hx, other one without any medical issues), and one full brother (59 y/o healthy).  - NPM1 +, CEBPA (-), FLT3 (+).  On Midostaurin 50 mg PO BID until Day 14 (held starting 6/8 to 6/11). Stopped when FLAG JENNIFER re-induction started. Restarted Midostaurin at 25 mg bid on day 8 of FLAG JENNIFER induction (6/22), increased to full dose 50 mg bid (6/26) as improvement in liver enzymes. Stopped 7/3/18 due to abnormal cardiac echo.    - Received 7+3, day 14 bone marrow biopsy completed 6/11 showing persistent disease with 15% CD 34 positive blasts; was reinduced with FLAG-JENNIFER, day 14 restaging bone marrow biopsy done 6/29 without complication, results with no evidence of AML, FLT 3 negative  - Repeat marrow at count recovery 7/31 showed no evidence of AML  - Derm bx of right palm negative for leukemia cutis and sweets  - Admitted on 8/14/18 for planned cycle 1 hidac  -Pt is day +6 of therapy.  -Echo showed normal EF.  Pt can receive Midostaurin 50mg BID during days 8-21 of this consolidation cycle.  Will discuss holding given elevated LFTs

## 2018-08-28 ENCOUNTER — OFFICE VISIT (OUTPATIENT)
Dept: CARDIOLOGY | Facility: CLINIC | Age: 68
End: 2018-08-28
Payer: MEDICARE

## 2018-08-28 ENCOUNTER — TELEPHONE (OUTPATIENT)
Dept: HEMATOLOGY/ONCOLOGY | Facility: CLINIC | Age: 68
End: 2018-08-28

## 2018-08-28 ENCOUNTER — INFUSION (OUTPATIENT)
Dept: INFUSION THERAPY | Facility: HOSPITAL | Age: 68
End: 2018-08-28
Attending: INTERNAL MEDICINE
Payer: MEDICARE

## 2018-08-28 ENCOUNTER — OFFICE VISIT (OUTPATIENT)
Dept: HEMATOLOGY/ONCOLOGY | Facility: CLINIC | Age: 68
End: 2018-08-28
Payer: MEDICARE

## 2018-08-28 VITALS
SYSTOLIC BLOOD PRESSURE: 121 MMHG | BODY MASS INDEX: 24.45 KG/M2 | RESPIRATION RATE: 18 BRPM | HEART RATE: 73 BPM | DIASTOLIC BLOOD PRESSURE: 61 MMHG | TEMPERATURE: 98 F | HEIGHT: 63 IN | WEIGHT: 138 LBS

## 2018-08-28 VITALS
HEART RATE: 96 BPM | HEIGHT: 63 IN | SYSTOLIC BLOOD PRESSURE: 90 MMHG | WEIGHT: 139.13 LBS | DIASTOLIC BLOOD PRESSURE: 55 MMHG | BODY MASS INDEX: 24.65 KG/M2

## 2018-08-28 VITALS
BODY MASS INDEX: 24.61 KG/M2 | DIASTOLIC BLOOD PRESSURE: 51 MMHG | HEIGHT: 63 IN | HEART RATE: 83 BPM | TEMPERATURE: 98 F | SYSTOLIC BLOOD PRESSURE: 88 MMHG | WEIGHT: 138.88 LBS

## 2018-08-28 DIAGNOSIS — I10 ESSENTIAL HYPERTENSION: ICD-10-CM

## 2018-08-28 DIAGNOSIS — C92.00 AML (ACUTE MYELOBLASTIC LEUKEMIA): Primary | ICD-10-CM

## 2018-08-28 DIAGNOSIS — R21 RASH OF HANDS: ICD-10-CM

## 2018-08-28 DIAGNOSIS — I42.7 CHEMOTHERAPY INDUCED CARDIOMYOPATHY: Primary | ICD-10-CM

## 2018-08-28 DIAGNOSIS — Z86.19 HISTORY OF CLOSTRIDIUM DIFFICILE INFECTION: ICD-10-CM

## 2018-08-28 DIAGNOSIS — T45.1X5A CHEMOTHERAPY INDUCED CARDIOMYOPATHY: Primary | ICD-10-CM

## 2018-08-28 DIAGNOSIS — C92.00 ACUTE MYELOID LEUKEMIA NOT HAVING ACHIEVED REMISSION: Primary | ICD-10-CM

## 2018-08-28 DIAGNOSIS — D61.810 PANCYTOPENIA DUE TO CHEMOTHERAPY: ICD-10-CM

## 2018-08-28 DIAGNOSIS — C93.01 ACUTE MONOCYTIC LEUKEMIA IN REMISSION: ICD-10-CM

## 2018-08-28 DIAGNOSIS — C92.00 AML (ACUTE MYELOBLASTIC LEUKEMIA): ICD-10-CM

## 2018-08-28 DIAGNOSIS — C92.00 ACUTE MYELOID LEUKEMIA NOT HAVING ACHIEVED REMISSION: ICD-10-CM

## 2018-08-28 LAB
BLD PROD TYP BPU: NORMAL
BLD PROD TYP BPU: NORMAL
BLOOD UNIT EXPIRATION DATE: NORMAL
BLOOD UNIT EXPIRATION DATE: NORMAL
BLOOD UNIT TYPE CODE: 6200
BLOOD UNIT TYPE CODE: 6200
BLOOD UNIT TYPE: NORMAL
BLOOD UNIT TYPE: NORMAL
CODING SYSTEM: NORMAL
CODING SYSTEM: NORMAL
DISPENSE STATUS: NORMAL
DISPENSE STATUS: NORMAL
NUM UNITS TRANS PACKED RBC: NORMAL
NUM UNITS TRANS WBC-POOR PLATPHERESIS: NORMAL

## 2018-08-28 PROCEDURE — 99213 OFFICE O/P EST LOW 20 MIN: CPT | Mod: PBBFAC | Performed by: INTERNAL MEDICINE

## 2018-08-28 PROCEDURE — 99214 OFFICE O/P EST MOD 30 MIN: CPT | Mod: S$PBB,,, | Performed by: INTERNAL MEDICINE

## 2018-08-28 PROCEDURE — 99213 OFFICE O/P EST LOW 20 MIN: CPT | Mod: PBBFAC,25,27 | Performed by: INTERNAL MEDICINE

## 2018-08-28 PROCEDURE — 99999 PR PBB SHADOW E&M-EST. PATIENT-LVL III: CPT | Mod: PBBFAC,,, | Performed by: INTERNAL MEDICINE

## 2018-08-28 PROCEDURE — P9040 RBC LEUKOREDUCED IRRADIATED: HCPCS

## 2018-08-28 PROCEDURE — 86920 COMPATIBILITY TEST SPIN: CPT

## 2018-08-28 PROCEDURE — P9037 PLATE PHERES LEUKOREDU IRRAD: HCPCS

## 2018-08-28 PROCEDURE — 99215 OFFICE O/P EST HI 40 MIN: CPT | Mod: S$PBB,,, | Performed by: INTERNAL MEDICINE

## 2018-08-28 PROCEDURE — 25000003 PHARM REV CODE 250: Performed by: INTERNAL MEDICINE

## 2018-08-28 PROCEDURE — 36430 TRANSFUSION BLD/BLD COMPNT: CPT

## 2018-08-28 RX ORDER — HYDROCODONE BITARTRATE AND ACETAMINOPHEN 500; 5 MG/1; MG/1
TABLET ORAL ONCE
Status: COMPLETED | OUTPATIENT
Start: 2018-08-28 | End: 2018-08-28

## 2018-08-28 RX ORDER — HYDROCODONE BITARTRATE AND ACETAMINOPHEN 500; 5 MG/1; MG/1
TABLET ORAL ONCE
Status: DISCONTINUED | OUTPATIENT
Start: 2018-08-28 | End: 2018-08-28 | Stop reason: HOSPADM

## 2018-08-28 RX ORDER — DIPHENHYDRAMINE HCL 25 MG
25 CAPSULE ORAL
Status: CANCELLED | OUTPATIENT
Start: 2018-08-28

## 2018-08-28 RX ORDER — HYDROCODONE BITARTRATE AND ACETAMINOPHEN 500; 5 MG/1; MG/1
TABLET ORAL ONCE
Status: CANCELLED | OUTPATIENT
Start: 2018-08-28 | End: 2018-08-28

## 2018-08-28 RX ORDER — DIPHENHYDRAMINE HCL 25 MG
25 CAPSULE ORAL
Status: COMPLETED | OUTPATIENT
Start: 2018-08-28 | End: 2018-08-28

## 2018-08-28 RX ORDER — HYDROCODONE BITARTRATE AND ACETAMINOPHEN 500; 5 MG/1; MG/1
TABLET ORAL
Status: CANCELLED | OUTPATIENT
Start: 2018-08-28

## 2018-08-28 RX ORDER — METOPROLOL SUCCINATE 25 MG/1
12.5 TABLET, EXTENDED RELEASE ORAL DAILY
Qty: 30 TABLET | Refills: 3 | Status: SHIPPED | OUTPATIENT
Start: 2018-08-28 | End: 2019-08-28

## 2018-08-28 RX ORDER — ACETAMINOPHEN 325 MG/1
650 TABLET ORAL
Status: CANCELLED | OUTPATIENT
Start: 2018-08-28

## 2018-08-28 RX ORDER — ACETAMINOPHEN 325 MG/1
650 TABLET ORAL
Status: COMPLETED | OUTPATIENT
Start: 2018-08-28 | End: 2018-08-28

## 2018-08-28 RX ADMIN — ACETAMINOPHEN 650 MG: 325 TABLET, FILM COATED ORAL at 04:08

## 2018-08-28 RX ADMIN — SODIUM CHLORIDE: 0.9 INJECTION, SOLUTION INTRAVENOUS at 04:08

## 2018-08-28 RX ADMIN — DIPHENHYDRAMINE HYDROCHLORIDE 25 MG: 25 CAPSULE ORAL at 04:08

## 2018-08-28 NOTE — PROGRESS NOTES
Hematology and Medical Oncology   Follow Up Note     08/28/2018    Primary Oncologic Diagnosis: AML, FLT 3 + and NPMN1+.   History of Present Ilness:   Sabrina Smith) is a pleasant 68 y.o.female presents to clinic following 2 induction therapies for AML. She was in the hospital roughly 50 days to receive 7+3 and then FLAG JENNIFER.    Oncology History:   68 y.o. female admitted overnight for possible new AML. Came in from OSH with elevated WBC of 100.   05/25/2018: Pt is now on hydrea 2 grams BID, allopurinol 300 mg daily, and IVF. Pt may need rasburicase this weekend. She will undergo a BM bx and aspiration today. She is an induction candidate and will not be screened for research trials. She has anxiety for which she usually takes xanax, this was re-started.   05/26/2018 PICC placed. Reports she slept well last night. Anxiety improved with prn xanax. EF 65%, HIV and Hep panel negative. 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  6/12/2018: Day 15 of 7+3 induction, restaging BM bx done yesterday. On Midostaurin. Fever yesterday and BC with gram + cocci in clusters. On cefepime day 2 and will start Vanc today. Labial mucositis improving.   6/13/2018: Day 16 7+3. BC with staph aureus, identification pending. Surveillance blood cultures done today. Afebrile x 48 hours. On cefepime and Vanc. Labial mucositis resolved. No complaints of pain. Nausea controlled. No diarrhea. Primary complaint is fatigue.   6/14/20018: Day 17 of 7+3. Day 14 bone marrow results pending. BC with staph epidermis only sensitive to Vancomycin. Vanc day 3 and cefepime day 4. Vanc trough 12.2 last night. BP remains low but stable. Afebrile x 72 hours.   6/15/2018: Day 18 of 7+3. Day 14 bone marrow biopsy shows persistent disease with 15% blasts. Discussion with patient regarding treatment and she is deciding if she wants to proceed with re-induction vs outpatient maintenance. Temp of 100.4 overnight, unsustained. Day 5 Cefepime and Day 4 of  Vanc for staph epidermis. Repeat cultures NGTD. BP improved. Electrolytes (mag, phos, K and calcium) replaced aggressively this am and will repeat labs this afternoon. No complaints this am.   06/16/2018: Day 19 of 7+3. Day 2 of Flag-JENNIFER. Remains afebrile. Calcium remains low and have increased PO supplementation. Remains on vanc and aztreonam. Somewhat loopy feeling after receiving benadryl.   06/17/2018: Day 20 of 7+3. Day 3 of FLAG-JENNIFER. Multiple electrolyte abnormalities. New bilateral leg and feet swelling.   6/18/2018: Day 21 of 7+3 and Day 4 of FLAG JENNIFER. Tolerating well with some nausea controlled with Zofran. VSS, afebrile. ANC 1900 on Neupogen. Continues Vanc for staph epi bacteremia. Multiple electrolytes replaced today. Complains of edema to lower extremities, not improved after 20 of lasix yesterday. No sob or CP.   6/19/18: Day 22 of 7+3 and Day 5 of FLAG JENNIFER. VSS, afebrile, ANC 3900. Vanc trough elevated and dose adjusted this am. Lower extremity edema improved after 40 of lasix yesterday, net negative 300 cc I&O. Mild nausea, no abdominal pain or diarrhea. Poor appetite but no difficulty eating or taking pills.   6/20/18: Day 23 of 7+3 and Day 6 of FLAG JENNIFER. Patient complains of nausea and vomiting overnight and this am, especially when taking pills. Will add Zyprexa daily in addition to Zofran, compazine, and ativan PRN and will change meds to IV. 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 Afebrile.  No diarrhea, mouth sores or pain.  06/21/2018: Day 24 of 7+3 and Day 7 of FLAG JENNIFER. Nausea better controlled. Continued on Vanc.  6/22/2018: Day 25 of 7+3 and Day 8 of FLAG JENNIFER. Nausea improved some with Zyprexa but did have 1 episode of emesis overnight.continuing meds IV due to vomiting. Remains afebrile. ANC 0. Continues Vanc and Flagyl. Electrolytes replaced.   06/23/2018 : day 26 of 7+3 and Day 9 of FLAG JENNIFER.  Nausea persists,  worsened after taking pills so meds converted to IV.  Encouraged ambulation.  Continue with flagyl for leptotrichia goodfellowii.  RUQ US showed intrahepatic dilation, overall impression hepatic steatosis.  CBD 0.5cm.  No Gallbladder.    06/24/2018 : day 27 of 7+3 and Day 10 of FLAG JENNIFER.  Nausea persisting, able to tolerate some po pills.  Last day of flagyl (day 5).  Still pancytopenic. Appetite still low as po intake triggers nausea.  06/25/2018: day 28 of 7+3 and Day 11 of FLAG JENNIFER. Afebrile, ANC 0. Has completed Flagyl. Will decrease Vanc to 1000 mg q12, trough 19.9, will complete Vanc on 6/27. Liver enzymes stable on Midostaurin. VSS.   6/26/2018: day 29 of 7+3 and day 12 of FLAG JENNIFER. Liver enzymes improved and Midostaurin increased to full dose 50 mg bid. Nausea controlled, afebrile, VSS, NEON.  6/27/2018: day 30 of 7+3 and Day 13 of FLAG JENNIFER. Persistent nausea and emesis x 1 yesterday. Compazine changed to scheduled. Vanc ends today. Afebrile, VSS. Aggressive electrolyte replacement, low electrolytes possibly due to Midostaurin.   6/28/2018: day 31 of 7+3 and Day 14 of FLAG JENNIFER. Nausea improved with scheduled Compazine. Patient has agreed to start converting some IV meds to po. VSS, afebrile, electrolytes wnl today. Only complains of fatigue, new rash noted to upper back and chest and legs, papular rash mildly red.  6/29/2018: Day 32 of 7+3 and Day 15 of FLAG JENNIFER. Day 14 restaging bone marrow biopsy done at bedside today. Patient states nausea improved but she did have emesis last night after taking 9 pm pills. Rash improved. No mouth sores, diarrhea or pain.   7/2/2018: Day 35 of 7+3 and Day 18 of FLAG JENNIFER. Restaging BM bx results pending. Fluid overload over the weekend. Chest x-ray with pulmonary edema. Os sats down to 88%. Placed on O2 at 2 L NC, off O2 this am. Received lasix. Net negative 2.7 L today. 1 episode of nausea, no emesis. Reports anxiety relieved with PRN meds. Electrolytes improved,  afebrile, VSS.   7/3/2018: Day 36 of 7+3 and Day 19 of FLAG JENNIFER. Restaging Bm bx with GABRIEL. Cardiology consulted for abnormal echo, EF 30% with pulmonary HTN and severe L atrial enlargement. EKG with T wave abnormality, possible anterolateral ischemia and prolonged QTc of 530. Medications adjusted. Nausea controlled, no diarrhea. Electrolytes improved. On O2 as needed.   07/04/2018 : Day 37 of 7+3 and Day 20 of FLAG JENNIFER Diarrhea in AM, watery, no associated abdominal pain, nausea, or vomiting.    07/05/2018: Day 38 of 7+3 ane Day 21 of FLAG JENNIFER. No CP or SOB, cardiology following. On RA. All meds changed to po, denies nausea or vomiting and no diarrhea. VSS, afebrile.   7/6/2018: Day 22 of FLAG JENNIFER. Continues to tolerate po meds with no nausea. Afebrile. Awaiting count recovery for discharge.  7/7/2018-/8/2018: Day 40/41 of 7+3, Day 23/24 of FLAG JENNIFER.  Improving clinically.  Started on mag oxide per patient request.  Labs showing signs of ANC recovery.  7/9/2018: Day 25 FLAG JENNIFER, , continues Neupogen. Received platelets today. Afebrile, VSS. Tolerating all oral meds with no nausea or vomiting. Only complains of fatigue.   7/10/2018: Day 26 FLAG JENNIFER,  today. Had 2 episodes of vomiting and diarrhea last night. Feeling better. Afebrile, VSS.   7/11/2018: Day 27 FLAG JENNIFER,  today. No vomiting or diarrhea overnight and no nausea. Afebrile, VSS. Complains of back pain (bone pain) suspect due to count recovery. Relieved with oxy IR and Zyrtec started.  07/12/2018: Day 28 flag jennifer, engrafted today with ANC of 730. Back pain improved with zyrtec. Scheduled for IT chemo tomorrow at 1:30 pm and discharge home thereafter. Will likely need plt transfusion prior to IT chemo tomorrow   7/13/2018: Day 29 of FLAG JENNIFER. ANC up to 1300 today. Transfusing platelets today prior to LP for IT chemo. Patient continues to complain of bone pain, mostly to back and legs. No nausea, diarrhea, sob. Afebrile and VSS.    --hospitalized 7/23-7/25 for C.diff diarrhea, neutropenic fever.  While inpatient she developed pink blisters on her palm that were concerning for relapse.  --palm biopsy on 7/26/18 for suspicion of leukemia cutis was also negative for sign of relapse  --bone marrow biopsy 7/31/18 was negative for signs of relapse   --Admitted on 8/14/18 for HiDAC#1    Interval History:   Ms. Mac continues to do well at home. Nausea and vomiting are near resolved. Her energy remains on the low side and in the last 2-3 days she has been dizzy upon standing. She has not had a near syncopal event. This morning she noticed a single blood blister on her lip.     Past Medical History:   Past Medical History:   Diagnosis Date    Cancer 03/2018    AML    CHF (congestive heart failure)        Current Medications:   Current Outpatient Medications   Medication Sig    acyclovir (ZOVIRAX) 200 MG capsule Take 2 capsules (400 mg total) by mouth 2 (two) times daily.    ALPRAZolam (XANAX) 0.25 MG tablet Take 1 tablet (0.25 mg total) by mouth 3 (three) times daily as needed for Anxiety.    ciprofloxacin HCl (CIPRO) 500 MG tablet Take 1 tablet (500 mg total) by mouth every 12 (twelve) hours.    escitalopram oxalate (LEXAPRO) 10 MG tablet Take 10 mg by mouth once daily.    fluconazole (DIFLUCAN) 200 MG Tab Take 2 tablets (400 mg total) by mouth once daily.    hydrocortisone 2.5 % cream Apply topically 2 (two) times daily. Apply to affected area on palms for 10 days    LORazepam (ATIVAN) 0.5 MG tablet Take 1 tablet (0.5 mg total) by mouth every 6 (six) hours as needed (nausea).    magnesium oxide (MAG-OX) 400 mg tablet Take 2 tablets (800 mg total) by mouth 2 (two) times daily.    metoprolol succinate (TOPROL-XL) 25 MG 24 hr tablet Take 0.5 tablets (12.5 mg total) by mouth once daily.    midostaurin (RYDAPT) 25 mg Cap Take 50 mg by mouth 2 (two) times daily on days 8 to 21 of cycle    morphine (MS CONTIN) 15 MG 12 hr tablet Take 2  tablets (30 mg total) by mouth 2 (two) times daily.    morphine (MSIR) 15 MG tablet Take 1 tablet (15 mg total) by mouth every 4 (four) hours as needed.    omeprazole (PRILOSEC OTC) 20 MG tablet Take 20 mg by mouth every morning.    ondansetron (ZOFRAN) 8 MG tablet Take 1 tablet (8 mg total) by mouth every 12 (twelve) hours as needed for Nausea.    prochlorperazine (COMPAZINE) 10 MG tablet Take 1 tablet (10 mg total) by mouth 4 (four) times daily.    vitamin D 1000 units Tab Take 1 tablet (1,000 Units total) by mouth once daily.     No current facility-administered medications for this visit.      Facility-Administered Medications Ordered in Other Visits   Medication    0.9%  NaCl infusion (for blood administration)     ALLERGIES:   Review of patient's allergies indicates:   Allergen Reactions    Methotrexate analogues      Elevated Liver Enzyme    Bactrim [sulfamethoxazole-trimethoprim] Nausea And Vomiting and Rash       Review of Systems:     Review of Systems   Constitutional: Positive for appetite change and fatigue. Negative for chills, diaphoresis, fever and unexpected weight change.   HENT:   Negative for hearing loss, mouth sores, nosebleeds, sore throat, trouble swallowing and voice change.    Eyes: Negative for eye problems and icterus.   Respiratory: Negative for chest tightness, cough, hemoptysis, shortness of breath and wheezing.    Cardiovascular: Negative for chest pain, leg swelling and palpitations.   Gastrointestinal: Negative for abdominal distention, abdominal pain, blood in stool, diarrhea, nausea and vomiting.   Endocrine: Negative for hot flashes.   Genitourinary: Negative for bladder incontinence, difficulty urinating, dysuria and hematuria.    Musculoskeletal: Negative for arthralgias, back pain, flank pain, gait problem, myalgias, neck pain and neck stiffness.   Skin: Negative for itching, rash and wound.   Neurological: Negative for dizziness, extremity weakness, gait problem,  headaches, numbness, seizures and speech difficulty.   Hematological: Negative for adenopathy. Does not bruise/bleed easily.   Psychiatric/Behavioral: Positive for sleep disturbance. Negative for confusion and depression. The patient is nervous/anxious.           Physical Exam:     Vitals:    08/28/18 1514   BP: (!) 88/51   Pulse: 83   Temp: 97.8 °F (36.6 °C)       Physical Exam   Constitutional: She is oriented to person, place, and time. She appears well-developed and well-nourished. No distress.   HENT:   Head: Normocephalic and atraumatic.   Mouth/Throat: Oropharynx is clear and moist. No oropharyngeal exudate.   No hair, wearing a cap  Single midline scab on the lower lip   Eyes: Conjunctivae and EOM are normal. Pupils are equal, round, and reactive to light.   Neck: Normal range of motion. Neck supple. No JVD present. No tracheal deviation present. No thyromegaly present.   Cardiovascular: Normal rate, regular rhythm and normal heart sounds. Exam reveals no friction rub.   No murmur heard.  Pulmonary/Chest: Effort normal and breath sounds normal. No stridor. No respiratory distress. She has no wheezes. She has no rales. She exhibits no tenderness.   Abdominal: Soft. Bowel sounds are normal. She exhibits no distension. There is no tenderness. There is no rebound and no guarding.   Musculoskeletal: Normal range of motion. She exhibits no edema, tenderness or deformity.   Lymphadenopathy:     She has no axillary adenopathy.   Neurological: She is alert and oriented to person, place, and time. She displays normal reflexes. No cranial nerve deficit or sensory deficit. She exhibits normal muscle tone. Coordination normal.   Skin: Skin is warm and dry. Capillary refill takes less than 2 seconds. No rash noted. She is not diaphoretic. No erythema. No pallor.   Raised discrete erythematous lesions on bilateral palms - resolved   Psychiatric: She has a normal mood and affect. Her behavior is normal. Judgment and thought  content normal.       ECOG Performance Status: (foot note - ECOG PS provided by Eastern Cooperative Oncology Group) 2 - Symptomatic, <50% confined to bed    Karnofsky Performance Score:  70%- Cares for Self: Unable to Carry on Normal Activity or Active Work    Labs:   Lab Results   Component Value Date    WBC 0.10 (LL) 08/28/2018    HGB 6.0 (L) 08/28/2018    HCT 17.2 (LL) 08/28/2018    PLT 1 (LL) 08/28/2018    ALT 18 08/28/2018    AST 15 08/28/2018     08/28/2018    K 3.3 (L) 08/28/2018     08/28/2018    CREATININE 0.8 08/28/2018    BUN 7 (L) 08/28/2018    CO2 24 08/28/2018    INR 1.1 05/31/2018       Imaging: previous imaging has been reviewed    Assessment and Plan:     Mrs. Mac is a pleasant 68 year old female with recently diagnosed AML.      Acute Monocytic Leukemia  -- Admitted from [facility] on 5/25/18 early AM with WBC around 100s, for suspected new non-M3 AML  --lo res HLA typing on 5/26/18 and hi res on 5/27/18 done in anticipation of possible need for future transplant   --Pt with two daughters, two full sisters (in their mid 60s, one with brain aneurysm hx, other one without any medical issues), and one full brother (61 y/o healthy).  --NPM1 +, CEBPA (-), FLT3 (+).  On Midostaurin 50 mg PO BID until Day 14 (held starting 6/8 to 6/11). Stopped when FLAG JENNIFER re-induction started. Restarted Midostaurin at 25 mg bid on day 8 of FLAG JENNIFER induction (6/22), increase to full dose 50 mg bid (6/26) as improvement in liver enzymes. Stopped 7/3/18 due to abnormal cardiac echo.  --day 14 bone marrow biopsy completed 6/11 showing persistent disease with 15% CD 34 positive blasts  --Day 60 of FLAG-JENNIFER, tolerating without difficulty except nausea/vomiting  --day 14 restaging bone marrow biopsy done 6/29 without complication, results with no evidence of AML, FLT 3 negative, will need repeat marrow at count recovery outpatient   --recovery marrow showed no evidence of disease  --HiDAC #1 on  8/14/18  --Between consolidation therapies mon/thurs labs locally with the reports sent to me    Chemotherapy related anemia/thromboctopenia   --will receive blood and platelets upstairs today  --Will likely need additional blood products later this week, will be arranged in [place]    Suspected Leukemia Cutis  --pathology from dermatology confirmed inflammatory changes  --now resolved with topical steroid cream    Abnormal Liver Function Tests  --ALT and AST have now normalized. Alk phos 154 today.   --midostaurin started day 8 at 25 mg bid, monitoring liver enzymes closely and improved. Increasing Midostaurin to 50 mg bid 6/26. Stopped Midostaurin (7/3) due to new diagnosis of systolic heart failure EF 35%.  --6/22/18 liver US showing mild intrahepatic dilation, otherwise impression of hepatic steatosis.  Will hold off of MRCP at this time.     --continue to monitor alk phos    Chemotherapy induced cardiomyopathy  --cardiology follow-up as outpatient in 3 months  --repeat 2D echo on 6/15 with preserved EF of 60%.  However 7/2/18 echo with reduced EF 30-35%  --Echo on 8/15/18 was within normal range EF of 60-65%    30 minutes were spent face to face with the patient and her  family to discuss the disease, natural history, treatment options and survival statistics. I have provided the patient with an opportunity to ask questions and have all questions answered to her satisfaction.       she will return to clinic in 2 weeks for planned admission, but knows to call in the interim if symptoms change or should a problem arise.        Laquita Troy MD  Hematology and Medical Oncology  Bone Marrow Transplant  [facility]

## 2018-08-28 NOTE — PLAN OF CARE
Problem: Anemia (Adult)  Goal: Identify Related Risk Factors and Signs and Symptoms  Related risk factors and signs and symptoms are identified upon initiation of Human Response Clinical Practice Guideline (CPG)  Outcome: Ongoing (interventions implemented as appropriate)  Pt here for 1 unit platelets and 1 unit PRBC, labs, hx, meds, allergies reviewed, pt with just c/o feeling weak and tired, assisted to chair from wheelchair with minimal assist , warm blanket provided, continue to monitor

## 2018-08-28 NOTE — LETTER
August 28, 2018      Laquita Troy MD  [address]  [facility] [address]           [facility] - Cardiology  [address]  [facility] [address]  Phone: [phone]          Patient: Sabrina Mac   MR Number: [id number]   YOB: 1950   Date of Visit: 8/28/2018       Dear Dr. Laquita Troy:    Thank you for referring Sabrina Mac to me for evaluation. Attached you will find relevant portions of my assessment and plan of care.    If you have questions, please do not hesitate to call me. I look forward to following Sabrina Mac along with you.    Sincerely,    Radha Agarwal MD    Enclosure  CC:  No Recipients    If you would like to receive this communication electronically, please contact [email or web address] or [phone] to request more information on [employer] access.    For providers and/or their staff who would like to refer a patient to [facility], please contact us through our one-stop-shop provider referral line, [facility], at [phone].    If you feel you have received this communication in error or would no longer like to receive these types of communications, please e-mail [email or web address]

## 2018-08-28 NOTE — Clinical Note
1. See me on 9/11 at 4:00pm [okay to override consults], with cbc,cmp, type and screen2. Elective admit to [facility] on 9/11/18 [alisson will be on service] for HiDAC chemotherapy

## 2018-08-28 NOTE — PROGRESS NOTES
Subjective:   Patient ID:  Sabrina Mac is a 68 y.o. 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 During her hospitalization for induction therapy, on day 36 of of 7+3 and Day 19 of FLAG JENNIFER,l she became short of breath and had an O2% of 88%.  CXR showed pulmonary edema.  2D echo on  showed that she had new reduced EF of 30% (previous echo on 6/15/18 showed EF of 60%) with regional wall motion abnormalities. Her ECG showed new anterolateral T wave inversions. The FLAG-JENNIFER was held, she was diuresed, and started on lisinopril and metoprolol. Her repeat echo done  showed her LVEF had normalized to 60-65%. She has not had any further episodes of CHF. Her lisinopril and metoprolol were stopped due to symptomatic hypotension. She is scheduled to start midostaurin, however, it is currently on hold due to elevated liver enzymes.    ECG (8/15/18): NSr 68 bpm with QTc 489 ms.    Past Medical History:   Diagnosis Date    Cancer 2018    AML    CHF (congestive heart failure)        Past Surgical History:   Procedure Laterality Date    CHOLECYSTECTOMY      HYSTERECTOMY      TONSILLECTOMY         Social History     Socioeconomic History    Marital status:      Spouse name: None    Number of children: None    Years of education: None    Highest education level: None   Social Needs    Financial resource strain: None    Food insecurity - worry: None    Food insecurity - inability: None    Transportation needs - medical: None    Transportation needs - non-medical: None   Occupational History    None   Tobacco Use    Smoking status: Former Smoker     Packs/day: 0.50     Years: 10.00     Pack years: 5.00     Types: Cigarettes     Last attempt to quit: 3/25/2017     Years since quittin.4    Smokeless tobacco: Never Used   Substance and Sexual Activity    Alcohol use: Yes     Alcohol/week: 0.6 oz     Types: 1  Shots of liquor per week    Drug use: No    Sexual activity: Yes     Partners: Female   Other Topics Concern    None   Social History Narrative    None       Family History   Problem Relation Age of Onset    Cancer Mother     Cancer Father     Heart attack Neg Hx     Heart disease Neg Hx        Patient's Medications   New Prescriptions    No medications on file   Previous Medications    ACYCLOVIR (ZOVIRAX) 200 MG CAPSULE    Take 2 capsules (400 mg total) by mouth 2 (two) times daily.    ALPRAZOLAM (XANAX) 0.25 MG TABLET    Take 1 tablet (0.25 mg total) by mouth 3 (three) times daily as needed for Anxiety.    CIPROFLOXACIN HCL (CIPRO) 500 MG TABLET    Take 1 tablet (500 mg total) by mouth every 12 (twelve) hours.    ESCITALOPRAM OXALATE (LEXAPRO) 10 MG TABLET    Take 10 mg by mouth once daily.    FLUCONAZOLE (DIFLUCAN) 200 MG TAB    Take 2 tablets (400 mg total) by mouth once daily.    HYDROCORTISONE 2.5 % CREAM    Apply topically 2 (two) times daily. Apply to affected area on palms for 10 days    LORAZEPAM (ATIVAN) 0.5 MG TABLET    Take 1 tablet (0.5 mg total) by mouth every 6 (six) hours as needed (nausea).    MAGNESIUM OXIDE (MAG-OX) 400 MG TABLET    Take 2 tablets (800 mg total) by mouth 2 (two) times daily.    MIDOSTAURIN (RYDAPT) 25 MG CAP    Take 50 mg by mouth 2 (two) times daily on days 8 to 21 of cycle    MORPHINE (MS CONTIN) 15 MG 12 HR TABLET    Take 2 tablets (30 mg total) by mouth 2 (two) times daily.    MORPHINE (MSIR) 15 MG TABLET    Take 1 tablet (15 mg total) by mouth every 4 (four) hours as needed.    OMEPRAZOLE (PRILOSEC OTC) 20 MG TABLET    Take 20 mg by mouth every morning.    ONDANSETRON (ZOFRAN) 8 MG TABLET    Take 1 tablet (8 mg total) by mouth every 12 (twelve) hours as needed for Nausea.    PROCHLORPERAZINE (COMPAZINE) 10 MG TABLET    Take 1 tablet (10 mg total) by mouth 4 (four) times daily.    VITAMIN D 1000 UNITS TAB    Take 1 tablet (1,000 Units total) by mouth once daily.  "  Modified Medications    Modified Medication Previous Medication    METOPROLOL SUCCINATE (TOPROL-XL) 25 MG 24 HR TABLET metoprolol succinate (TOPROL-XL) 25 MG 24 hr tablet       Take 0.5 tablets (12.5 mg total) by mouth once daily.    Take 0.5 tablets (12.5 mg total) by mouth once daily.   Discontinued Medications    CETIRIZINE (ZYRTEC) 10 MG TABLET    Take 1 tablet (10 mg total) by mouth once daily for 10 days       Review of Systems   Constitution: Positive for malaise/fatigue and weight loss. Negative for weakness and weight gain.   HENT: Negative for hearing loss.    Eyes: Negative for visual disturbance.   Cardiovascular: Negative for chest pain, claudication, dyspnea on exertion, leg swelling, near-syncope, orthopnea, palpitations, paroxysmal nocturnal dyspnea and syncope.   Respiratory: Negative for cough, shortness of breath, sleep disturbances due to breathing, snoring and wheezing.    Endocrine: Negative for cold intolerance, heat intolerance, polydipsia, polyphagia and polyuria.   Hematologic/Lymphatic: Negative for bleeding problem. Does not bruise/bleed easily.   Skin: Negative for rash and suspicious lesions.   Musculoskeletal: Negative for arthritis, falls, joint pain, muscle weakness and myalgias.   Gastrointestinal: Positive for vomiting. Negative for abdominal pain, change in bowel habit, constipation, diarrhea, heartburn, hematochezia, melena and nausea.   Genitourinary: Negative for hematuria and nocturia.   Neurological: Negative for excessive daytime sleepiness, dizziness, headaches, light-headedness and loss of balance.   Psychiatric/Behavioral: Negative for depression. The patient has insomnia. The patient is not nervous/anxious.    Allergic/Immunologic: Negative for environmental allergies.       BP (!) 90/55 (BP Location: Left arm, Patient Position: Sitting, BP Method: Large (Automatic))   Pulse 96   Ht 5' 3" (1.6 m)   Wt 63.1 kg (139 lb 1.8 oz)   LMP  (LMP Unknown)   BMI 24.64 kg/m² "     Objective:   Physical Exam   Constitutional: She is oriented to person, place, and time. She appears well-developed and well-nourished. She has a sickly appearance.   Appears pale     HENT:   Head: Normocephalic and atraumatic.   Mouth/Throat: Oropharynx is clear and moist.   Eyes: Conjunctivae and EOM are normal. Pupils are equal, round, and reactive to light. No scleral icterus.   Neck: Normal range of motion. Neck supple. No hepatojugular reflux and no JVD present. No tracheal deviation present. No thyromegaly present.   Cardiovascular: Normal rate, regular rhythm, normal heart sounds and intact distal pulses. PMI is not displaced.   Pulses:       Carotid pulses are 2+ on the right side, and 2+ on the left side.       Radial pulses are 2+ on the right side, and 2+ on the left side.        Dorsalis pedis pulses are 2+ on the right side, and 2+ on the left side.        Posterior tibial pulses are 2+ on the right side, and 2+ on the left side.   Pulmonary/Chest: Effort normal and breath sounds normal.   Abdominal: Soft. Bowel sounds are normal. She exhibits no distension and no mass. There is no hepatosplenomegaly. There is no tenderness.   Musculoskeletal: She exhibits no edema or tenderness.   Lymphadenopathy:     She has no cervical adenopathy.   Neurological: She is alert and oriented to person, place, and time.   Skin: Skin is warm and dry. No rash noted. No cyanosis or erythema. Nails show no clubbing.   Alopecia   Psychiatric: She has a normal mood and affect. Her speech is normal and behavior is normal.       Lab Results   Component Value Date     08/28/2018    K 3.3 (L) 08/28/2018     08/28/2018    CO2 24 08/28/2018    BUN 7 (L) 08/28/2018    CREATININE 0.8 08/28/2018     08/28/2018    MG 2.2 08/28/2018    AST 15 08/28/2018    ALT 18 08/28/2018    ALBUMIN 3.0 (L) 08/28/2018    PROT 6.1 08/28/2018    BILITOT 0.5 08/28/2018    WBC 0.10 (LL) 08/28/2018    HGB 6.0 (L) 08/28/2018    HCT  17.2 (LL) 08/28/2018    MCV 89 08/28/2018    PLT 1 (LL) 08/28/2018    INR 1.1 05/31/2018       Assessment:     1. Chemotherapy induced cardiomyopathy : Her LVEF has normalized. We discussed taking metoprolol 12.5 mg nightly to see if she can tolerate it. I would recommend checking an ECG and troponin level monthly while taking midostaurin to monitor the QT interval as well as for any early sign of cardiac toxicity. Recommend repeating her echo in 3 months and with any abnormal troponin level.    2. Acute monocytic leukemia in remission        Plan:     Sabrina was seen today for chemotherapy induced cardiomyopathy.    Diagnoses and all orders for this visit:    Chemotherapy induced cardiomyopathy    Acute monocytic leukemia in remission        Thank you for allowing me to participate in this patient's care. Please do not hesitate to contact me with any questions or concerns.

## 2018-08-28 NOTE — PLAN OF CARE
Problem: Patient Care Overview  Goal: Plan of Care Review  Outcome: Ongoing (interventions implemented as appropriate)  1830 -- Patient tolerated unit of RBC without complication.  Vital signs stable.  No apparent distress noted.  Discharged patient without s/s of adverse event.  AVS/Outpatient Blood Transfusion handout given.  Patient instructed of s/s of transfusion reaction and when  to notify the provider.  Patient verbalized understanding.

## 2018-08-30 ENCOUNTER — DOCUMENTATION ONLY (OUTPATIENT)
Dept: HEMATOLOGY/ONCOLOGY | Facility: CLINIC | Age: 68
End: 2018-08-30

## 2018-08-30 ENCOUNTER — PATIENT MESSAGE (OUTPATIENT)
Dept: HEMATOLOGY/ONCOLOGY | Facility: CLINIC | Age: 68
End: 2018-08-30

## 2018-09-03 ENCOUNTER — PATIENT MESSAGE (OUTPATIENT)
Dept: HEMATOLOGY/ONCOLOGY | Facility: CLINIC | Age: 68
End: 2018-09-03

## 2018-09-04 ENCOUNTER — PATIENT MESSAGE (OUTPATIENT)
Dept: HEMATOLOGY/ONCOLOGY | Facility: CLINIC | Age: 68
End: 2018-09-04

## 2018-09-11 ENCOUNTER — HOSPITAL ENCOUNTER (INPATIENT)
Facility: HOSPITAL | Age: 68
LOS: 5 days | Discharge: HOME | DRG: 837 | End: 2018-09-16
Attending: INTERNAL MEDICINE | Admitting: INTERNAL MEDICINE
Payer: MEDICARE

## 2018-09-11 ENCOUNTER — OFFICE VISIT (OUTPATIENT)
Dept: HEMATOLOGY/ONCOLOGY | Facility: CLINIC | Age: 68
DRG: 837 | End: 2018-09-11
Payer: MEDICARE

## 2018-09-11 VITALS
WEIGHT: 141.13 LBS | DIASTOLIC BLOOD PRESSURE: 62 MMHG | HEART RATE: 89 BPM | HEIGHT: 63 IN | SYSTOLIC BLOOD PRESSURE: 138 MMHG | OXYGEN SATURATION: 98 % | RESPIRATION RATE: 16 BRPM | BODY MASS INDEX: 25.01 KG/M2 | TEMPERATURE: 98 F

## 2018-09-11 DIAGNOSIS — C93.00 ACUTE MONOCYTIC LEUKEMIA NOT HAVING ACHIEVED REMISSION: ICD-10-CM

## 2018-09-11 DIAGNOSIS — C92.00 AML (ACUTE MYELOBLASTIC LEUKEMIA): ICD-10-CM

## 2018-09-11 DIAGNOSIS — R79.89 ELEVATED LFTS: ICD-10-CM

## 2018-09-11 DIAGNOSIS — C92.00 ACUTE MYELOID LEUKEMIA NOT HAVING ACHIEVED REMISSION: ICD-10-CM

## 2018-09-11 DIAGNOSIS — C92.01 ACUTE MYELOID LEUKEMIA IN REMISSION: Primary | ICD-10-CM

## 2018-09-11 DIAGNOSIS — T45.1X5A CHEMOTHERAPY INDUCED CARDIOMYOPATHY: ICD-10-CM

## 2018-09-11 DIAGNOSIS — I42.7 CHEMOTHERAPY INDUCED CARDIOMYOPATHY: ICD-10-CM

## 2018-09-11 DIAGNOSIS — C93.01 ACUTE MONOCYTIC LEUKEMIA IN REMISSION: ICD-10-CM

## 2018-09-11 PROBLEM — G89.29 CHRONIC PAIN: Status: ACTIVE | Noted: 2018-09-11

## 2018-09-11 PROCEDURE — 99223 1ST HOSP IP/OBS HIGH 75: CPT | Mod: ,,, | Performed by: INTERNAL MEDICINE

## 2018-09-11 PROCEDURE — XW043B3 INTRODUCTION OF CYTARABINE AND DAUNORUBICIN LIPOSOME ANTINEOPLASTIC INTO CENTRAL VEIN, PERCUTANEOUS APPROACH, NEW TECHNOLOGY GROUP 3: ICD-10-PCS | Performed by: INTERNAL MEDICINE

## 2018-09-11 PROCEDURE — 25000003 PHARM REV CODE 250: Performed by: NURSE PRACTITIONER

## 2018-09-11 PROCEDURE — 63600175 PHARM REV CODE 636 W HCPCS: Performed by: INTERNAL MEDICINE

## 2018-09-11 PROCEDURE — 99214 OFFICE O/P EST MOD 30 MIN: CPT | Mod: PBBFAC,25 | Performed by: INTERNAL MEDICINE

## 2018-09-11 PROCEDURE — 20600001 HC STEP DOWN PRIVATE ROOM

## 2018-09-11 PROCEDURE — 99999 PR PBB SHADOW E&M-EST. PATIENT-LVL IV: CPT | Mod: PBBFAC,,, | Performed by: INTERNAL MEDICINE

## 2018-09-11 PROCEDURE — 25000003 PHARM REV CODE 250: Performed by: INTERNAL MEDICINE

## 2018-09-11 RX ORDER — IBUPROFEN 200 MG
16 TABLET ORAL
Status: DISCONTINUED | OUTPATIENT
Start: 2018-09-11 | End: 2018-09-16 | Stop reason: HOSPADM

## 2018-09-11 RX ORDER — SODIUM CHLORIDE 9 MG/ML
INJECTION, SOLUTION INTRAVENOUS CONTINUOUS
Status: CANCELLED | OUTPATIENT
Start: 2018-09-11

## 2018-09-11 RX ORDER — DEXAMETHASONE 0.5 MG/1
8 TABLET ORAL
Status: CANCELLED | OUTPATIENT
Start: 2018-09-13

## 2018-09-11 RX ORDER — DEXAMETHASONE 0.5 MG/1
8 TABLET ORAL
Status: CANCELLED | OUTPATIENT
Start: 2018-09-11

## 2018-09-11 RX ORDER — GLUCAGON 1 MG
1 KIT INJECTION
Status: DISCONTINUED | OUTPATIENT
Start: 2018-09-11 | End: 2018-09-16 | Stop reason: HOSPADM

## 2018-09-11 RX ORDER — ESCITALOPRAM OXALATE 10 MG/1
10 TABLET ORAL DAILY
Status: DISCONTINUED | OUTPATIENT
Start: 2018-09-12 | End: 2018-09-16 | Stop reason: HOSPADM

## 2018-09-11 RX ORDER — HEPARIN 100 UNIT/ML
300 SYRINGE INTRAVENOUS
Status: CANCELLED | OUTPATIENT
Start: 2018-09-11

## 2018-09-11 RX ORDER — OXYCODONE HYDROCHLORIDE 5 MG/1
5 TABLET ORAL EVERY 4 HOURS PRN
Status: DISCONTINUED | OUTPATIENT
Start: 2018-09-11 | End: 2018-09-16 | Stop reason: HOSPADM

## 2018-09-11 RX ORDER — SODIUM CHLORIDE 0.9 % (FLUSH) 0.9 %
10 SYRINGE (ML) INJECTION
Status: CANCELLED | OUTPATIENT
Start: 2018-09-11

## 2018-09-11 RX ORDER — SODIUM CHLORIDE 0.9 % (FLUSH) 0.9 %
5 SYRINGE (ML) INJECTION
Status: DISCONTINUED | OUTPATIENT
Start: 2018-09-11 | End: 2018-09-16 | Stop reason: HOSPADM

## 2018-09-11 RX ORDER — ACYCLOVIR 200 MG/1
400 CAPSULE ORAL 2 TIMES DAILY
Status: DISCONTINUED | OUTPATIENT
Start: 2018-09-11 | End: 2018-09-16 | Stop reason: HOSPADM

## 2018-09-11 RX ORDER — DEXAMETHASONE 4 MG/1
8 TABLET ORAL
Status: COMPLETED | OUTPATIENT
Start: 2018-09-16 | End: 2018-09-16

## 2018-09-11 RX ORDER — ONDANSETRON 4 MG/1
8 TABLET, FILM COATED ORAL
Status: CANCELLED | OUTPATIENT
Start: 2018-09-15

## 2018-09-11 RX ORDER — DEXAMETHASONE 4 MG/1
8 TABLET ORAL
Status: COMPLETED | OUTPATIENT
Start: 2018-09-14 | End: 2018-09-14

## 2018-09-11 RX ORDER — HEPARIN 100 UNIT/ML
300 SYRINGE INTRAVENOUS
Status: DISCONTINUED | OUTPATIENT
Start: 2018-09-11 | End: 2018-09-16 | Stop reason: HOSPADM

## 2018-09-11 RX ORDER — DEXAMETHASONE 4 MG/1
8 TABLET ORAL
Status: COMPLETED | OUTPATIENT
Start: 2018-09-11 | End: 2018-09-12

## 2018-09-11 RX ORDER — ALPRAZOLAM 0.25 MG/1
0.25 TABLET ORAL 3 TIMES DAILY PRN
Status: DISCONTINUED | OUTPATIENT
Start: 2018-09-11 | End: 2018-09-16 | Stop reason: HOSPADM

## 2018-09-11 RX ORDER — DEXAMETHASONE SODIUM PHOSPHATE 1 MG/ML
1 SOLUTION/ DROPS OPHTHALMIC EVERY 6 HOURS
Status: CANCELLED | OUTPATIENT
Start: 2018-09-11

## 2018-09-11 RX ORDER — ONDANSETRON 4 MG/1
8 TABLET, FILM COATED ORAL
Status: COMPLETED | OUTPATIENT
Start: 2018-09-11 | End: 2018-09-12

## 2018-09-11 RX ORDER — IBUPROFEN 200 MG
24 TABLET ORAL
Status: DISCONTINUED | OUTPATIENT
Start: 2018-09-11 | End: 2018-09-16 | Stop reason: HOSPADM

## 2018-09-11 RX ORDER — ONDANSETRON 4 MG/1
8 TABLET, FILM COATED ORAL
Status: COMPLETED | OUTPATIENT
Start: 2018-09-16 | End: 2018-09-16

## 2018-09-11 RX ORDER — DEXAMETHASONE SODIUM PHOSPHATE 1 MG/ML
1 SOLUTION/ DROPS OPHTHALMIC EVERY 6 HOURS
Status: DISCONTINUED | OUTPATIENT
Start: 2018-09-11 | End: 2018-09-16 | Stop reason: HOSPADM

## 2018-09-11 RX ORDER — ONDANSETRON 4 MG/1
8 TABLET, FILM COATED ORAL
Status: COMPLETED | OUTPATIENT
Start: 2018-09-14 | End: 2018-09-14

## 2018-09-11 RX ORDER — ONDANSETRON 4 MG/1
8 TABLET, FILM COATED ORAL
Status: CANCELLED | OUTPATIENT
Start: 2018-09-11

## 2018-09-11 RX ORDER — DEXAMETHASONE 0.5 MG/1
8 TABLET ORAL
Status: CANCELLED | OUTPATIENT
Start: 2018-09-15

## 2018-09-11 RX ORDER — SODIUM CHLORIDE 9 MG/ML
INJECTION, SOLUTION INTRAVENOUS CONTINUOUS
Status: DISCONTINUED | OUTPATIENT
Start: 2018-09-11 | End: 2018-09-16 | Stop reason: HOSPADM

## 2018-09-11 RX ORDER — SODIUM CHLORIDE 0.9 % (FLUSH) 0.9 %
10 SYRINGE (ML) INJECTION
Status: DISCONTINUED | OUTPATIENT
Start: 2018-09-11 | End: 2018-09-16 | Stop reason: HOSPADM

## 2018-09-11 RX ORDER — ONDANSETRON 4 MG/1
8 TABLET, FILM COATED ORAL
Status: CANCELLED | OUTPATIENT
Start: 2018-09-13

## 2018-09-11 RX ADMIN — ACYCLOVIR 400 MG: 200 CAPSULE ORAL at 08:09

## 2018-09-11 RX ADMIN — ONDANSETRON 8 MG: 4 TABLET, FILM COATED ORAL at 09:09

## 2018-09-11 RX ADMIN — SODIUM CHLORIDE: 0.9 INJECTION, SOLUTION INTRAVENOUS at 09:09

## 2018-09-11 RX ADMIN — DEXAMETHASONE 8 MG: 4 TABLET ORAL at 09:09

## 2018-09-11 RX ADMIN — DEXAMETHASONE 1 DROP: 1 SUSPENSION OPHTHALMIC at 09:09

## 2018-09-11 NOTE — HPI
Primary Oncologic Diagnosis: AML, FLT 3 + and NPMN1+.   History of Present Ilness:   Sabrina Mac (Sabrina) is a pleasant 68 y.o.female electively admitted for cycle 2 of HiDAC for AML. She has previously had 2 induction therapies for AML. She was in the hospital roughly 50 days to receive 7+3 and then FLAG JENNIFER. Was previously admitted on 8/14/18 for HiDAC#1.     Oncology History:   68 y.o. female admitted overnight for possible new AML. Came in from OSH with elevated WBC of 100 on 05/25/2018.  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  -5/28/18: started 7+3 induction  -6/11/18: restaging BM bx done, shows persistent disease with 15% blasts. Discussion with patient regarding treatment and she is deciding if she wants to proceed with re-induction vs outpatient maintenance.   -06/15/2018: started Flag-JENNIFER.   --hospitalized 7/23-7/25 for C.diff diarrhea, neutropenic fever.  While inpatient she developed pink blisters on her palm that were concerning for relapse.  --palm biopsy on 7/26/18 for suspicion of leukemia cutis was also negative for sign of relapse  --bone marrow biopsy 7/31/18 was negative for signs of relapse   --Admitted on 8/14/18 for HiDAC#1  --Admitted on 9/11/18 for HiDAC#2

## 2018-09-11 NOTE — Clinical Note
1. See me 2pm on 9/26 with cbc,cmp,type and screen2. See me on 10/9 at 4om with cbc,cmp3. Elective admit on 10/9 to BMT for HiDAC #3 [cynthia is attending on service]

## 2018-09-11 NOTE — HOSPITAL COURSE
Admitted electively for cycle 2 of HiDAC for AML. Good tolerance of her chemotherapy except nausea and vomiting was managed with antiemetics and her home Benzodiazepine (Lorazepam). She was actively ambulating, maintain his mentation, and she was having fairly stable and baseline appetite 25%-100% of her meal completed. She did not require any pRBC or Platelet transfusion during this cycle. Her sixth dose of HiDAC completed the evening of 9/16/2018. Good tolerance with no complain of chest pain, shortness of breath, or change in mental status. Discharged with her PPx Cipro Fluconazole and Acyclovir and one pRBC before her discharge.

## 2018-09-11 NOTE — ASSESSMENT & PLAN NOTE
"- Per Dr. Agarwal's last note 8/28/18: "Her LVEF has normalized. We discussed taking metoprolol 12.5 mg nightly to see if she can tolerate it. I would recommend checking an ECG and troponin level monthly while taking midostaurin to monitor the QT interval as well as for any early sign of cardiac toxicity. Recommend repeating her echo in 3 months and with any abnormal troponin level."  - Hold toprol if hypotensive  "

## 2018-09-11 NOTE — ASSESSMENT & PLAN NOTE
- Admitted from [facility] on 5/25/18 early AM with WBC around 100s, for suspected new non-M3 AML  - Lo res HLA typing on 5/26/18 and hi res on 5/27/18 done in anticipation of possible need for future transplant   - Pt with two daughters, two full sisters (in their mid 60s, one with brain aneurysm hx, other one without any medical issues), and one full brother (59 y/o healthy).  - NPM1 +, CEBPA (-), FLT3 (+).  On Midostaurin 50 mg PO BID until Day 14 (held starting 6/8 to 6/11). Stopped when FLAG JENNIFER re-induction started. Restarted Midostaurin at 25 mg bid on day 8 of FLAG JENNIFER induction (6/22), increased to full dose 50 mg bid (6/26) as improvement in liver enzymes. Stopped 7/3/18 due to abnormal cardiac echo.     - Received 7+3, day 14 bone marrow biopsy completed 6/11 showing persistent disease with 15% CD 34 positive blasts; was reinduced with FLAG-JENNIFER, day 14 restaging bone marrow biopsy done 6/29 without complication, results with no evidence of AML, FLT 3 negative  - Repeat marrow at count recovery 7/31 showed no evidence of AML  - Derm bx of right palm negative for leukemia cutis and sweets  - Admitted on 8/14/18 for planned cycle 1 hidac  - Admitted on 9/11/18 for planned cycle 2 hidac

## 2018-09-11 NOTE — SUBJECTIVE & OBJECTIVE
Patient information was obtained from patient, past medical records and ER records.     Oncology History:      Medications Prior to Admission   Medication Sig Dispense Refill Last Dose    acyclovir (ZOVIRAX) 200 MG capsule Take 2 capsules (400 mg total) by mouth 2 (two) times daily. 120 capsule 11 Taking    ALPRAZolam (XANAX) 0.25 MG tablet Take 1 tablet (0.25 mg total) by mouth 3 (three) times daily as needed for Anxiety. 40 tablet 1 Taking    ciprofloxacin HCl (CIPRO) 500 MG tablet Take 1 tablet (500 mg total) by mouth every 12 (twelve) hours. 60 tablet 3 Taking    escitalopram oxalate (LEXAPRO) 10 MG tablet Take 10 mg by mouth once daily.   Taking    fluconazole (DIFLUCAN) 200 MG Tab Take 2 tablets (400 mg total) by mouth once daily. 60 tablet 3 Taking    hydrocortisone 2.5 % cream Apply topically 2 (two) times daily. Apply to affected area on palms for 10 days 28 g 3 Taking    LORazepam (ATIVAN) 0.5 MG tablet Take 1 tablet (0.5 mg total) by mouth every 6 (six) hours as needed (nausea). 60 tablet 0 Taking    magnesium oxide (MAG-OX) 400 mg tablet Take 2 tablets (800 mg total) by mouth 2 (two) times daily. 120 tablet 1 Taking    metoprolol succinate (TOPROL-XL) 25 MG 24 hr tablet Take 0.5 tablets (12.5 mg total) by mouth once daily. 30 tablet 3 Taking    midostaurin (RYDAPT) 25 mg Cap Take 50 mg by mouth 2 (two) times daily on days 8 to 21 of cycle 56 capsule 2 Taking    morphine (MS CONTIN) 15 MG 12 hr tablet Take 2 tablets (30 mg total) by mouth 2 (two) times daily. 60 tablet 0 Taking    morphine (MSIR) 15 MG tablet Take 1 tablet (15 mg total) by mouth every 4 (four) hours as needed. 40 tablet 0 Taking    omeprazole (PRILOSEC OTC) 20 MG tablet Take 20 mg by mouth every morning.   Taking    ondansetron (ZOFRAN) 8 MG tablet Take 1 tablet (8 mg total) by mouth every 12 (twelve) hours as needed for Nausea. 30 tablet 2 Taking    prochlorperazine (COMPAZINE) 10 MG tablet Take 1 tablet (10 mg total) by  mouth 4 (four) times daily. 60 tablet 3 Taking    vitamin D 1000 units Tab Take 1 tablet (1,000 Units total) by mouth once daily. 30 tablet 2 Taking       Methotrexate analogues; Sulfa (sulfonamide antibiotics); and Bactrim [sulfamethoxazole-trimethoprim]     Past Medical History:   Diagnosis Date    Cancer 2018    AML    CHF (congestive heart failure)      Past Surgical History:   Procedure Laterality Date    CHOLECYSTECTOMY      HYSTERECTOMY      TONSILLECTOMY       Family History     Problem Relation (Age of Onset)    Cancer Mother, Father        Tobacco Use    Smoking status: Former Smoker     Packs/day: 0.50     Years: 10.00     Pack years: 5.00     Types: Cigarettes     Last attempt to quit: 3/25/2017     Years since quittin.4    Smokeless tobacco: Never Used   Substance and Sexual Activity    Alcohol use: Yes     Alcohol/week: 0.6 oz     Types: 1 Shots of liquor per week    Drug use: No    Sexual activity: Yes     Partners: Female       Review of Systems   Constitutional: Negative for appetite change, chills, fatigue and fever.   HENT: Negative for sinus pressure, sinus pain and sore throat.    Eyes: Negative for photophobia.   Respiratory: Positive for shortness of breath (FRIED). Negative for cough.    Cardiovascular: Negative for chest pain and leg swelling.   Gastrointestinal: Negative for abdominal pain, constipation, diarrhea, nausea and vomiting.   Genitourinary: Negative for dysuria.   Musculoskeletal: Negative for arthralgias, back pain and myalgias.   Skin: Negative for rash.   Neurological: Negative for dizziness, syncope, light-headedness, numbness and headaches.   Hematological: Does not bruise/bleed easily.   Psychiatric/Behavioral: Negative for agitation and confusion.     Objective:     Vital Signs (Most Recent):  Temp: 98.3 °F (36.8 °C) (18)  Pulse: 75 (18)  Resp: 18 (18)  BP: (!) 148/66 (18)  SpO2: (!) 94 % (18) Vital  Signs (24h Range):  Temp:  [98.2 °F (36.8 °C)-98.5 °F (36.9 °C)] 98.3 °F (36.8 °C)  Pulse:  [75-89] 75  Resp:  [16-20] 18  SpO2:  [94 %-99 %] 94 %  BP: (136-148)/(62-66) 148/66     Weight: 62.8 kg (138 lb 7.2 oz)  Body mass index is 24.53 kg/m².  Body surface area is 1.67 meters squared.    ECOG SCORE         [unfilled]    Lines/Drains/Airways     Peripheral Intravenous Line                 Peripheral IV - Single Lumen Anterior;Right Antecubital -- days                Physical Exam   Constitutional: She is oriented to person, place, and time. She appears well-nourished. No distress.   HENT:   Head: Normocephalic and atraumatic.   alopecia   Eyes: Conjunctivae and EOM are normal. No scleral icterus.   Neck: Normal range of motion. Neck supple.   Cardiovascular: Normal rate and regular rhythm.   No murmur heard.  Pulmonary/Chest: Effort normal. No respiratory distress. She has no wheezes. She has no rales.   Abdominal: Soft. She exhibits no distension. There is no tenderness. There is no guarding.   Musculoskeletal: Normal range of motion. She exhibits no edema or tenderness.   Lymphadenopathy:     She has no cervical adenopathy.   Neurological: She is alert and oriented to person, place, and time.   Skin: Skin is warm. No rash noted. She is not diaphoretic. No erythema.   Psychiatric: She has a normal mood and affect.       Significant Labs:   CBC:   Recent Labs   Lab  09/11/18   1522   WBC  4.58   HGB  9.0*   HCT  27.3*   PLT  140*    and CMP:   Recent Labs   Lab  09/11/18   1522   NA  138   K  3.9   CL  104   CO2  24   GLU  107   BUN  9   CREATININE  0.8   CALCIUM  10.0   PROT  6.5   ALBUMIN  3.3*   BILITOT  0.3   ALKPHOS  119   AST  16   ALT  8*   ANIONGAP  10   EGFRNONAA  >60.0       Diagnostic Results:  I have reviewed all pertinent imaging results/findings within the past 24 hours.

## 2018-09-12 ENCOUNTER — TELEPHONE (OUTPATIENT)
Dept: PHARMACY | Facility: CLINIC | Age: 68
End: 2018-09-12

## 2018-09-12 LAB
ALBUMIN SERPL BCP-MCNC: 3.2 G/DL
ALP SERPL-CCNC: 110 U/L
ALT SERPL W/O P-5'-P-CCNC: 7 U/L
ANION GAP SERPL CALC-SCNC: 8 MMOL/L
ANISOCYTOSIS BLD QL SMEAR: SLIGHT
AST SERPL-CCNC: 13 U/L
BASOPHILS # BLD AUTO: ABNORMAL K/UL
BASOPHILS NFR BLD: 0 %
BILIRUB SERPL-MCNC: 0.3 MG/DL
BUN SERPL-MCNC: 10 MG/DL
CALCIUM SERPL-MCNC: 10 MG/DL
CHLORIDE SERPL-SCNC: 108 MMOL/L
CO2 SERPL-SCNC: 23 MMOL/L
CREAT SERPL-MCNC: 0.9 MG/DL
DIFFERENTIAL METHOD: ABNORMAL
EOSINOPHIL # BLD AUTO: ABNORMAL K/UL
EOSINOPHIL NFR BLD: 0 %
ERYTHROCYTE [DISTWIDTH] IN BLOOD BY AUTOMATED COUNT: 16 %
EST. GFR  (AFRICAN AMERICAN): >60 ML/MIN/1.73 M^2
EST. GFR  (NON AFRICAN AMERICAN): >60 ML/MIN/1.73 M^2
GLUCOSE SERPL-MCNC: 138 MG/DL
HCT VFR BLD AUTO: 25.1 %
HGB BLD-MCNC: 8.2 G/DL
HYPOCHROMIA BLD QL SMEAR: ABNORMAL
IMM GRANULOCYTES # BLD AUTO: ABNORMAL K/UL
IMM GRANULOCYTES NFR BLD AUTO: ABNORMAL %
LYMPHOCYTES # BLD AUTO: ABNORMAL K/UL
LYMPHOCYTES NFR BLD: 3 %
MAGNESIUM SERPL-MCNC: 1.7 MG/DL
MCH RBC QN AUTO: 30 PG
MCHC RBC AUTO-ENTMCNC: 32.7 G/DL
MCV RBC AUTO: 92 FL
MONOCYTES # BLD AUTO: ABNORMAL K/UL
MONOCYTES NFR BLD: 5 %
NEUTROPHILS NFR BLD: 92 %
NRBC BLD-RTO: 1 /100 WBC
OVALOCYTES BLD QL SMEAR: ABNORMAL
PHOSPHATE SERPL-MCNC: 3.5 MG/DL
PLATELET # BLD AUTO: 131 K/UL
PMV BLD AUTO: 10.3 FL
POIKILOCYTOSIS BLD QL SMEAR: SLIGHT
POLYCHROMASIA BLD QL SMEAR: ABNORMAL
POTASSIUM SERPL-SCNC: 4.9 MMOL/L
PROT SERPL-MCNC: 6.2 G/DL
RBC # BLD AUTO: 2.73 M/UL
SODIUM SERPL-SCNC: 139 MMOL/L
URATE SERPL-MCNC: 4.8 MG/DL
WBC # BLD AUTO: 4.15 K/UL

## 2018-09-12 PROCEDURE — 84100 ASSAY OF PHOSPHORUS: CPT

## 2018-09-12 PROCEDURE — 63600175 PHARM REV CODE 636 W HCPCS: Performed by: STUDENT IN AN ORGANIZED HEALTH CARE EDUCATION/TRAINING PROGRAM

## 2018-09-12 PROCEDURE — 84550 ASSAY OF BLOOD/URIC ACID: CPT

## 2018-09-12 PROCEDURE — 20600001 HC STEP DOWN PRIVATE ROOM

## 2018-09-12 PROCEDURE — 85007 BL SMEAR W/DIFF WBC COUNT: CPT

## 2018-09-12 PROCEDURE — 85027 COMPLETE CBC AUTOMATED: CPT

## 2018-09-12 PROCEDURE — 25000003 PHARM REV CODE 250: Performed by: INTERNAL MEDICINE

## 2018-09-12 PROCEDURE — 25000003 PHARM REV CODE 250: Performed by: NURSE PRACTITIONER

## 2018-09-12 PROCEDURE — 25000003 PHARM REV CODE 250: Performed by: STUDENT IN AN ORGANIZED HEALTH CARE EDUCATION/TRAINING PROGRAM

## 2018-09-12 PROCEDURE — 80053 COMPREHEN METABOLIC PANEL: CPT

## 2018-09-12 PROCEDURE — 36415 COLL VENOUS BLD VENIPUNCTURE: CPT

## 2018-09-12 PROCEDURE — 99233 SBSQ HOSP IP/OBS HIGH 50: CPT | Mod: ,,, | Performed by: INTERNAL MEDICINE

## 2018-09-12 PROCEDURE — 63600175 PHARM REV CODE 636 W HCPCS: Performed by: INTERNAL MEDICINE

## 2018-09-12 PROCEDURE — 83735 ASSAY OF MAGNESIUM: CPT

## 2018-09-12 RX ORDER — PROCHLORPERAZINE MALEATE 5 MG
5 TABLET ORAL ONCE
Status: COMPLETED | OUTPATIENT
Start: 2018-09-12 | End: 2018-09-12

## 2018-09-12 RX ORDER — PANTOPRAZOLE SODIUM 40 MG/1
40 TABLET, DELAYED RELEASE ORAL DAILY
Status: DISCONTINUED | OUTPATIENT
Start: 2018-09-12 | End: 2018-09-16 | Stop reason: HOSPADM

## 2018-09-12 RX ADMIN — PROCHLORPERAZINE MALEATE 5 MG: 5 TABLET, FILM COATED ORAL at 02:09

## 2018-09-12 RX ADMIN — DEXAMETHASONE 1 DROP: 1 SUSPENSION OPHTHALMIC at 05:09

## 2018-09-12 RX ADMIN — CYTARABINE 5040 MG: 100 INJECTION, SOLUTION INTRATHECAL; INTRAVENOUS; SUBCUTANEOUS at 04:09

## 2018-09-12 RX ADMIN — DEXAMETHASONE 1 DROP: 1 SUSPENSION OPHTHALMIC at 11:09

## 2018-09-12 RX ADMIN — ESCITALOPRAM OXALATE 10 MG: 10 TABLET ORAL at 08:09

## 2018-09-12 RX ADMIN — CYTARABINE 5040 MG: 100 INJECTION, SOLUTION INTRATHECAL; INTRAVENOUS; SUBCUTANEOUS at 05:09

## 2018-09-12 RX ADMIN — PANTOPRAZOLE SODIUM 40 MG: 40 TABLET, DELAYED RELEASE ORAL at 12:09

## 2018-09-12 RX ADMIN — METOPROLOL SUCCINATE 12.5 MG: 25 TABLET, EXTENDED RELEASE ORAL at 08:09

## 2018-09-12 RX ADMIN — ACYCLOVIR 400 MG: 200 CAPSULE ORAL at 08:09

## 2018-09-12 RX ADMIN — OXYCODONE HYDROCHLORIDE 5 MG: 5 TABLET ORAL at 07:09

## 2018-09-12 RX ADMIN — ONDANSETRON 8 MG: 4 TABLET, FILM COATED ORAL at 04:09

## 2018-09-12 RX ADMIN — DEXAMETHASONE 8 MG: 4 TABLET ORAL at 04:09

## 2018-09-12 RX ADMIN — OXYCODONE HYDROCHLORIDE 5 MG: 5 TABLET ORAL at 02:09

## 2018-09-12 NOTE — [facility] SECONDARY REVIEW
Physician Advisor External    Level of Care Issue    Approved Inpatient     Dr. Yojana Farrar @ EHR, patient is Inpatient appropriate.

## 2018-09-12 NOTE — PLAN OF CARE
MDR's with Dr Guerrier.  Patient is admitted for cycle #2 of HiDAC consolidation.  Patient will d/c this weekend after the completion of chemo.  No d/c needs anticipated.  CM will continue to follow.

## 2018-09-12 NOTE — PROGRESS NOTES
Chemotherapy Note:  Consent & CAR in chart. Premedicated with Ondansetron 8mg and Dexamethasone 8mg  PO. Cytarabine and patient verified at bedside by two chemo certified RNs, Abdon & Ruma CALLEJAS. Positive blood noted to right AC. Cytarabine 5040mg in sodium chloride 0.9% 250 mL IVPB administered to right AC over 3 hours at 83.3ml/hr. Chemotherapy precautions maintained & patient educated. Eye drops administered as ordered. Will continue to monitor.

## 2018-09-12 NOTE — TELEPHONE ENCOUNTER
Reached out to patient at the request of Aga Child, PharmD with inpatient BMT to coordinate delivery of Rydapt refill.  Patient notes that she doesn't need it, she has some at home and Dr. Troy doesn't want her on the medication any longer.  Aga Child to follow up and let [facility] know if we should proceed with refill.

## 2018-09-12 NOTE — PLAN OF CARE
Problem: Patient Care Overview  Goal: Plan of Care Review  Outcome: Ongoing (interventions implemented as appropriate)  POC reviewed with patient; understanding verbalized. Chemo and Dexamethasone drops administered as ordered. Pt remains independent. She voids in the BSC. Regular diet with good appetite. NS at 50mL. Pt C/O HA and nausea and received PRN Oxy and Compazine X1. Pt. with nonskid footwear on, bed in lowest position, and locked with bed rails up x2. Pt. has call light and personal items within reach. VSS and afebrile this shift. All questions and concerns addressed at this time. Will continue to monitor.

## 2018-09-12 NOTE — PROGRESS NOTES
[facility]  Hematology  Bone Marrow Transplant  Progress Note    Patient Name: Sabrina Mac  Admission Date: 9/11/2018  Hospital Length of Stay: 1 days  Code Status: Full Code  Patient information was obtained from patient, past medical records and ER records.     Oncology History:      Medications Prior to Admission   Medication Sig Dispense Refill Last Dose    acyclovir (ZOVIRAX) 200 MG capsule Take 2 capsules (400 mg total) by mouth 2 (two) times daily. 120 capsule 11 9/11/2018 at Unknown time    ciprofloxacin HCl (CIPRO) 500 MG tablet Take 1 tablet (500 mg total) by mouth every 12 (twelve) hours. 60 tablet 3 9/11/2018 at Unknown time    escitalopram oxalate (LEXAPRO) 10 MG tablet Take 10 mg by mouth once daily.   9/11/2018 at Unknown time    fluconazole (DIFLUCAN) 200 MG Tab Take 2 tablets (400 mg total) by mouth once daily. 60 tablet 3 Past Week at Unknown time    magnesium oxide (MAG-OX) 400 mg tablet Take 2 tablets (800 mg total) by mouth 2 (two) times daily. 120 tablet 1 9/11/2018 at Unknown time    metoprolol succinate (TOPROL-XL) 25 MG 24 hr tablet Take 0.5 tablets (12.5 mg total) by mouth once daily. 30 tablet 3 9/11/2018 at Unknown time    omeprazole (PRILOSEC OTC) 20 MG tablet Take 20 mg by mouth every morning.   9/11/2018 at Unknown time    ondansetron (ZOFRAN) 8 MG tablet Take 1 tablet (8 mg total) by mouth every 12 (twelve) hours as needed for Nausea. 30 tablet 2 Past Month at Unknown time    prochlorperazine (COMPAZINE) 10 MG tablet Take 1 tablet (10 mg total) by mouth 4 (four) times daily. 60 tablet 3 Past Month at Unknown time    vitamin D 1000 units Tab Take 1 tablet (1,000 Units total) by mouth once daily. 30 tablet 2 9/11/2018 at Unknown time    ALPRAZolam (XANAX) 0.25 MG tablet Take 1 tablet (0.25 mg total) by mouth 3 (three) times daily as needed for Anxiety. 40 tablet 1 Taking    hydrocortisone 2.5 % cream Apply topically 2 (two) times daily. Apply to  affected area on palms for 10 days 28 g 3 Taking    LORazepam (ATIVAN) 0.5 MG tablet Take 1 tablet (0.5 mg total) by mouth every 6 (six) hours as needed (nausea). 60 tablet 0 Unknown at Unknown time    midostaurin (RYDAPT) 25 mg Cap Take 50 mg by mouth 2 (two) times daily on days 8 to 21 of cycle 56 capsule 2 Unknown at Unknown time    morphine (MS CONTIN) 15 MG 12 hr tablet Take 2 tablets (30 mg total) by mouth 2 (two) times daily. 60 tablet 0 Unknown at Unknown time    morphine (MSIR) 15 MG tablet Take 1 tablet (15 mg total) by mouth every 4 (four) hours as needed. 40 tablet 0 Unknown at Unknown time       Methotrexate analogues; Sulfa (sulfonamide antibiotics); and Bactrim [sulfamethoxazole-trimethoprim]     Past Medical History:   Diagnosis Date    Cancer 2018    AML    CHF (congestive heart failure)     Encounter for blood transfusion      Past Surgical History:   Procedure Laterality Date    CHOLECYSTECTOMY      HYSTERECTOMY      TONSILLECTOMY       Family History     Problem Relation (Age of Onset)    Cancer Mother, Father        Tobacco Use    Smoking status: Former Smoker     Packs/day: 0.50     Years: 10.00     Pack years: 5.00     Types: Cigarettes     Last attempt to quit: 3/25/2017     Years since quittin.4    Smokeless tobacco: Never Used   Substance and Sexual Activity    Alcohol use: Yes     Alcohol/week: 0.6 oz     Types: 1 Shots of liquor per week    Drug use: No    Sexual activity: Yes     Partners: Female       Review of Systems   Constitutional: Negative for appetite change, chills, fatigue and fever.   HENT: Negative for sinus pressure, sinus pain and sore throat.    Eyes: Negative for photophobia.   Respiratory: Positive for shortness of breath (FRIED). Negative for cough.    Cardiovascular: Negative for chest pain and leg swelling.   Gastrointestinal: Negative for abdominal pain, constipation, diarrhea, nausea and vomiting.   Genitourinary: Negative for dysuria.    Musculoskeletal: Negative for arthralgias, back pain and myalgias.   Skin: Negative for rash.   Neurological: Negative for dizziness, syncope, light-headedness, numbness and headaches.   Hematological: Does not bruise/bleed easily.   Psychiatric/Behavioral: Negative for agitation and confusion.     Objective:     Vital Signs (Most Recent):  Temp: 97.7 °F (36.5 °C) (09/12/18 0717)  Pulse: 76 (09/12/18 0717)  Resp: 18 (09/12/18 0717)  BP: 128/60 (09/12/18 0717)  SpO2: 97 % (09/12/18 0717) Vital Signs (24h Range):  Temp:  [97.7 °F (36.5 °C)-98.5 °F (36.9 °C)] 97.7 °F (36.5 °C)  Pulse:  [75-90] 76  Resp:  [16-20] 18  SpO2:  [94 %-99 %] 97 %  BP: (107-148)/(56-66) 128/60     Weight: 62.7 kg (138 lb 3.7 oz)  Body mass index is 24.49 kg/m².  Body surface area is 1.67 meters squared.    ECOG SCORE         [unfilled]    Lines/Drains/Airways     Peripheral Intravenous Line                 Peripheral IV - Single Lumen Anterior;Right Antecubital -- days                Physical Exam   Constitutional: She is oriented to person, place, and time. She appears well-nourished. No distress.   HENT:   Head: Normocephalic and atraumatic.   alopecia   Eyes: Conjunctivae and EOM are normal. No scleral icterus.   Neck: Normal range of motion. Neck supple.   Cardiovascular: Normal rate and regular rhythm.   No murmur heard.  Pulmonary/Chest: Effort normal. No respiratory distress. She has no wheezes. She has no rales.   Abdominal: Soft. She exhibits no distension. There is no tenderness. There is no guarding.   Musculoskeletal: Normal range of motion. She exhibits no edema or tenderness.   Lymphadenopathy:     She has no cervical adenopathy.   Neurological: She is alert and oriented to person, place, and time.   Skin: Skin is warm. No rash noted. She is not diaphoretic. No erythema.   Psychiatric: She has a normal mood and affect.       Significant Labs:   CBC:   Recent Labs   Lab  09/11/18   1522  09/12/18   0514   WBC  4.58  4.15   HGB   9.0*  8.2*   HCT  27.3*  25.1*   PLT  140*  131*    and CMP:   Recent Labs   Lab  09/11/18   1522  09/12/18   0514   NA  138  139   K  3.9  4.9   CL  104  108   CO2  24  23   GLU  107  138*   BUN  9  10   CREATININE  0.8  0.9   CALCIUM  10.0  10.0   PROT  6.5  6.2   ALBUMIN  3.3*  3.2*   BILITOT  0.3  0.3   ALKPHOS  119  110   AST  16  13   ALT  8*  7*   ANIONGAP  10  8   EGFRNONAA  >60.0  >60.0       Diagnostic Results:  I have reviewed all pertinent imaging results/findings within the past 24 hours.    Assessment/Plan:     * AML (acute myeloblastic leukemia)    - Admitted from [facility] on 5/25/18 early AM with WBC around 100s, for suspected new non-M3 AML  - Lo res HLA typing on 5/26/18 and hi res on 5/27/18 done in anticipation of possible need for future transplant   - Pt with two daughters, two full sisters (in their mid 60s, one with brain aneurysm hx, other one without any medical issues), and one full brother (59 y/o healthy).  - NPM1 +, CEBPA (-), FLT3 (+).  On Midostaurin 50 mg PO BID until Day 14 (held starting 6/8 to 6/11). Stopped when FLAG JENNIFER re-induction started. Restarted Midostaurin at 25 mg bid on day 8 of FLAG JENNIFER induction (6/22), increased to full dose 50 mg bid (6/26) as improvement in liver enzymes. Stopped 7/3/18 due to abnormal cardiac echo.     - Received 7+3, day 14 bone marrow biopsy completed 6/11 showing persistent disease with 15% CD 34 positive blasts; was reinduced with FLAG-JENNIFER, day 14 restaging bone marrow biopsy done 6/29 without complication, results with no evidence of AML, FLT 3 negative  - Repeat marrow at count recovery 7/31 showed no evidence of AML  - Derm bx of right palm negative for leukemia cutis and sweets  - Admitted on 8/14/18 for planned cycle 1 hidac  - Admitted on 9/11/18 for planned cycle 2 hidac   - f/u uric acid        Chronic pain    - home pain regimen: MSIR and MS contin listed  - Oxycodone ordered PRN        Chemotherapy induced cardiomyopathy    -  "Per Dr. Agarwal's last note 8/28/18: "Her LVEF has normalized. We discussed taking metoprolol 12.5 mg nightly to see if she can tolerate it. I would recommend checking an ECG and troponin level monthly while taking midostaurin to monitor the QT interval as well as for any early sign of cardiac toxicity. Recommend repeating her echo in 3 months and with any abnormal troponin level."  - Hold toprol if hypotensive        Pancytopenia due to chemotherapy    - transfuse for hgb <7 and plt <10K        Depression    - continue home lexapro, xanax prn, and ativan prn            VTE Risk Mitigation (From admission, onward)        Ordered     heparin, porcine (PF) 100 unit/mL injection flush 300 Units  As needed (PRN)      09/11/18 8878     Place MAYLIN hose  Until discontinued      09/11/18 7420            Pita Barboza MD  Bone Marrow Transplant  [facility]      "

## 2018-09-12 NOTE — SUBJECTIVE & OBJECTIVE
Patient information was obtained from patient, past medical records and ER records.     Oncology History:      Medications Prior to Admission   Medication Sig Dispense Refill Last Dose    acyclovir (ZOVIRAX) 200 MG capsule Take 2 capsules (400 mg total) by mouth 2 (two) times daily. 120 capsule 11 9/11/2018 at Unknown time    ciprofloxacin HCl (CIPRO) 500 MG tablet Take 1 tablet (500 mg total) by mouth every 12 (twelve) hours. 60 tablet 3 9/11/2018 at Unknown time    escitalopram oxalate (LEXAPRO) 10 MG tablet Take 10 mg by mouth once daily.   9/11/2018 at Unknown time    fluconazole (DIFLUCAN) 200 MG Tab Take 2 tablets (400 mg total) by mouth once daily. 60 tablet 3 Past Week at Unknown time    magnesium oxide (MAG-OX) 400 mg tablet Take 2 tablets (800 mg total) by mouth 2 (two) times daily. 120 tablet 1 9/11/2018 at Unknown time    metoprolol succinate (TOPROL-XL) 25 MG 24 hr tablet Take 0.5 tablets (12.5 mg total) by mouth once daily. 30 tablet 3 9/11/2018 at Unknown time    omeprazole (PRILOSEC OTC) 20 MG tablet Take 20 mg by mouth every morning.   9/11/2018 at Unknown time    ondansetron (ZOFRAN) 8 MG tablet Take 1 tablet (8 mg total) by mouth every 12 (twelve) hours as needed for Nausea. 30 tablet 2 Past Month at Unknown time    prochlorperazine (COMPAZINE) 10 MG tablet Take 1 tablet (10 mg total) by mouth 4 (four) times daily. 60 tablet 3 Past Month at Unknown time    vitamin D 1000 units Tab Take 1 tablet (1,000 Units total) by mouth once daily. 30 tablet 2 9/11/2018 at Unknown time    ALPRAZolam (XANAX) 0.25 MG tablet Take 1 tablet (0.25 mg total) by mouth 3 (three) times daily as needed for Anxiety. 40 tablet 1 Taking    hydrocortisone 2.5 % cream Apply topically 2 (two) times daily. Apply to affected area on palms for 10 days 28 g 3 Taking    LORazepam (ATIVAN) 0.5 MG tablet Take 1 tablet (0.5 mg total) by mouth every 6 (six) hours as needed (nausea). 60 tablet 0 Unknown at Unknown time     midostaurin (RYDAPT) 25 mg Cap Take 50 mg by mouth 2 (two) times daily on days 8 to 21 of cycle 56 capsule 2 Unknown at Unknown time    morphine (MS CONTIN) 15 MG 12 hr tablet Take 2 tablets (30 mg total) by mouth 2 (two) times daily. 60 tablet 0 Unknown at Unknown time    morphine (MSIR) 15 MG tablet Take 1 tablet (15 mg total) by mouth every 4 (four) hours as needed. 40 tablet 0 Unknown at Unknown time       Methotrexate analogues; Sulfa (sulfonamide antibiotics); and Bactrim [sulfamethoxazole-trimethoprim]     Past Medical History:   Diagnosis Date    Cancer 2018    AML    CHF (congestive heart failure)     Encounter for blood transfusion      Past Surgical History:   Procedure Laterality Date    CHOLECYSTECTOMY      HYSTERECTOMY      TONSILLECTOMY       Family History     Problem Relation (Age of Onset)    Cancer Mother, Father        Tobacco Use    Smoking status: Former Smoker     Packs/day: 0.50     Years: 10.00     Pack years: 5.00     Types: Cigarettes     Last attempt to quit: 3/25/2017     Years since quittin.4    Smokeless tobacco: Never Used   Substance and Sexual Activity    Alcohol use: Yes     Alcohol/week: 0.6 oz     Types: 1 Shots of liquor per week    Drug use: No    Sexual activity: Yes     Partners: Female       Review of Systems   Constitutional: Negative for appetite change, chills, fatigue and fever.   HENT: Negative for sinus pressure, sinus pain and sore throat.    Eyes: Negative for photophobia.   Respiratory: Positive for shortness of breath (FRIED). Negative for cough.    Cardiovascular: Negative for chest pain and leg swelling.   Gastrointestinal: Negative for abdominal pain, constipation, diarrhea, nausea and vomiting.   Genitourinary: Negative for dysuria.   Musculoskeletal: Negative for arthralgias, back pain and myalgias.   Skin: Negative for rash.   Neurological: Negative for dizziness, syncope, light-headedness, numbness and headaches.   Hematological: Does not  bruise/bleed easily.   Psychiatric/Behavioral: Negative for agitation and confusion.     Objective:     Vital Signs (Most Recent):  Temp: 97.7 °F (36.5 °C) (09/12/18 0717)  Pulse: 76 (09/12/18 0717)  Resp: 18 (09/12/18 0717)  BP: 128/60 (09/12/18 0717)  SpO2: 97 % (09/12/18 0717) Vital Signs (24h Range):  Temp:  [97.7 °F (36.5 °C)-98.5 °F (36.9 °C)] 97.7 °F (36.5 °C)  Pulse:  [75-90] 76  Resp:  [16-20] 18  SpO2:  [94 %-99 %] 97 %  BP: (107-148)/(56-66) 128/60     Weight: 62.7 kg (138 lb 3.7 oz)  Body mass index is 24.49 kg/m².  Body surface area is 1.67 meters squared.    ECOG SCORE         [unfilled]    Lines/Drains/Airways     Peripheral Intravenous Line                 Peripheral IV - Single Lumen Anterior;Right Antecubital -- days                Physical Exam   Constitutional: She is oriented to person, place, and time. She appears well-nourished. No distress.   HENT:   Head: Normocephalic and atraumatic.   alopecia   Eyes: Conjunctivae and EOM are normal. No scleral icterus.   Neck: Normal range of motion. Neck supple.   Cardiovascular: Normal rate and regular rhythm.   No murmur heard.  Pulmonary/Chest: Effort normal. No respiratory distress. She has no wheezes. She has no rales.   Abdominal: Soft. She exhibits no distension. There is no tenderness. There is no guarding.   Musculoskeletal: Normal range of motion. She exhibits no edema or tenderness.   Lymphadenopathy:     She has no cervical adenopathy.   Neurological: She is alert and oriented to person, place, and time.   Skin: Skin is warm. No rash noted. She is not diaphoretic. No erythema.   Psychiatric: She has a normal mood and affect.       Significant Labs:   CBC:   Recent Labs   Lab  09/11/18   1522  09/12/18   0514   WBC  4.58  4.15   HGB  9.0*  8.2*   HCT  27.3*  25.1*   PLT  140*  131*    and CMP:   Recent Labs   Lab  09/11/18   1522  09/12/18   0514   NA  138  139   K  3.9  4.9   CL  104  108   CO2  24  23   GLU  107  138*   BUN  9  10   CREATININE   0.8  0.9   CALCIUM  10.0  10.0   PROT  6.5  6.2   ALBUMIN  3.3*  3.2*   BILITOT  0.3  0.3   ALKPHOS  119  110   AST  16  13   ALT  8*  7*   ANIONGAP  10  8   EGFRNONAA  >60.0  >60.0       Diagnostic Results:  I have reviewed all pertinent imaging results/findings within the past 24 hours.

## 2018-09-12 NOTE — PLAN OF CARE
Problem: Patient Care Overview  Goal: Plan of Care Review  Outcome: Ongoing (interventions implemented as appropriate)  Patient remains free from falls and injury this shift. Bed in low, locked position with call bell in reach. Patient encouraged to call for assistance when getting out of bed. Patient verbalized understanding. All belongings within reach. VS stable. Afebrile. No complaints thus far. Will continue to monitor.

## 2018-09-12 NOTE — H&P
[facility]  Hematology  Bone Marrow Transplant  H&P    Subjective:     Principal Problem: AML (acute myeloblastic leukemia)    HPI:   Primary Oncologic Diagnosis: AML, FLT 3 + and NPMN1+.   History of Present Ilness:   Sabrina Smith) is a pleasant 68 y.o.female electively admitted for cycle 2 of HiDAC for AML. She has previously had 2 induction therapies for AML. She was in the hospital roughly 50 days to receive 7+3 and then FLAG JENNIFER. Was previously admitted on 8/14/18 for HiDAC#1.     Oncology History:   68 y.o. female admitted overnight for possible new AML. Came in from OSH with elevated WBC of 100 on 05/25/2018.  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  -5/28/18: started 7+3 induction  -6/11/18: restaging BM bx done, shows persistent disease with 15% blasts. Discussion with patient regarding treatment and she is deciding if she wants to proceed with re-induction vs outpatient maintenance.   -06/15/2018: started Flag-JENNIFER.   --hospitalized 7/23-7/25 for C.diff diarrhea, neutropenic fever.  While inpatient she developed pink blisters on her palm that were concerning for relapse.  --palm biopsy on 7/26/18 for suspicion of leukemia cutis was also negative for sign of relapse  --bone marrow biopsy 7/31/18 was negative for signs of relapse   --Admitted on 8/14/18 for HiDAC#1  --Admitted on 9/11/18 for HiDAC#2    Patient information was obtained from patient, past medical records and ER records.     Oncology History:      Medications Prior to Admission   Medication Sig Dispense Refill Last Dose    acyclovir (ZOVIRAX) 200 MG capsule Take 2 capsules (400 mg total) by mouth 2 (two) times daily. 120 capsule 11 Taking    ALPRAZolam (XANAX) 0.25 MG tablet Take 1 tablet (0.25 mg total) by mouth 3 (three) times daily as needed for Anxiety. 40 tablet 1 Taking    ciprofloxacin HCl (CIPRO) 500 MG tablet Take 1 tablet (500 mg total) by mouth every 12 (twelve) hours. 60 tablet 3 Taking     escitalopram oxalate (LEXAPRO) 10 MG tablet Take 10 mg by mouth once daily.   Taking    fluconazole (DIFLUCAN) 200 MG Tab Take 2 tablets (400 mg total) by mouth once daily. 60 tablet 3 Taking    hydrocortisone 2.5 % cream Apply topically 2 (two) times daily. Apply to affected area on palms for 10 days 28 g 3 Taking    LORazepam (ATIVAN) 0.5 MG tablet Take 1 tablet (0.5 mg total) by mouth every 6 (six) hours as needed (nausea). 60 tablet 0 Taking    magnesium oxide (MAG-OX) 400 mg tablet Take 2 tablets (800 mg total) by mouth 2 (two) times daily. 120 tablet 1 Taking    metoprolol succinate (TOPROL-XL) 25 MG 24 hr tablet Take 0.5 tablets (12.5 mg total) by mouth once daily. 30 tablet 3 Taking    midostaurin (RYDAPT) 25 mg Cap Take 50 mg by mouth 2 (two) times daily on days 8 to 21 of cycle 56 capsule 2 Taking    morphine (MS CONTIN) 15 MG 12 hr tablet Take 2 tablets (30 mg total) by mouth 2 (two) times daily. 60 tablet 0 Taking    morphine (MSIR) 15 MG tablet Take 1 tablet (15 mg total) by mouth every 4 (four) hours as needed. 40 tablet 0 Taking    omeprazole (PRILOSEC OTC) 20 MG tablet Take 20 mg by mouth every morning.   Taking    ondansetron (ZOFRAN) 8 MG tablet Take 1 tablet (8 mg total) by mouth every 12 (twelve) hours as needed for Nausea. 30 tablet 2 Taking    prochlorperazine (COMPAZINE) 10 MG tablet Take 1 tablet (10 mg total) by mouth 4 (four) times daily. 60 tablet 3 Taking    vitamin D 1000 units Tab Take 1 tablet (1,000 Units total) by mouth once daily. 30 tablet 2 Taking       Methotrexate analogues; Sulfa (sulfonamide antibiotics); and Bactrim [sulfamethoxazole-trimethoprim]     Past Medical History:   Diagnosis Date    Cancer 03/2018    AML    CHF (congestive heart failure)      Past Surgical History:   Procedure Laterality Date    CHOLECYSTECTOMY      HYSTERECTOMY      TONSILLECTOMY       Family History     Problem Relation (Age of Onset)    Cancer Mother, Father        Tobacco Use     Smoking status: Former Smoker     Packs/day: 0.50     Years: 10.00     Pack years: 5.00     Types: Cigarettes     Last attempt to quit: 3/25/2017     Years since quittin.4    Smokeless tobacco: Never Used   Substance and Sexual Activity    Alcohol use: Yes     Alcohol/week: 0.6 oz     Types: 1 Shots of liquor per week    Drug use: No    Sexual activity: Yes     Partners: Female       Review of Systems   Constitutional: Negative for appetite change, chills, fatigue and fever.   HENT: Negative for sinus pressure, sinus pain and sore throat.    Eyes: Negative for photophobia.   Respiratory: Positive for shortness of breath (FRIED). Negative for cough.    Cardiovascular: Negative for chest pain and leg swelling.   Gastrointestinal: Negative for abdominal pain, constipation, diarrhea, nausea and vomiting.   Genitourinary: Negative for dysuria.   Musculoskeletal: Negative for arthralgias, back pain and myalgias.   Skin: Negative for rash.   Neurological: Negative for dizziness, syncope, light-headedness, numbness and headaches.   Hematological: Does not bruise/bleed easily.   Psychiatric/Behavioral: Negative for agitation and confusion.     Objective:     Vital Signs (Most Recent):  Temp: 98.3 °F (36.8 °C) (18)  Pulse: 75 (18)  Resp: 18 (18)  BP: (!) 148/66 (18)  SpO2: (!) 94 % (18) Vital Signs (24h Range):  Temp:  [98.2 °F (36.8 °C)-98.5 °F (36.9 °C)] 98.3 °F (36.8 °C)  Pulse:  [75-89] 75  Resp:  [16-20] 18  SpO2:  [94 %-99 %] 94 %  BP: (136-148)/(62-66) 148/66     Weight: 62.8 kg (138 lb 7.2 oz)  Body mass index is 24.53 kg/m².  Body surface area is 1.67 meters squared.    ECOG SCORE         [unfilled]    Lines/Drains/Airways     Peripheral Intravenous Line                 Peripheral IV - Single Lumen Anterior;Right Antecubital -- days                Physical Exam   Constitutional: She is oriented to person, place, and time. She appears well-nourished. No  distress.   HENT:   Head: Normocephalic and atraumatic.   alopecia   Eyes: Conjunctivae and EOM are normal. No scleral icterus.   Neck: Normal range of motion. Neck supple.   Cardiovascular: Normal rate and regular rhythm.   No murmur heard.  Pulmonary/Chest: Effort normal. No respiratory distress. She has no wheezes. She has no rales.   Abdominal: Soft. She exhibits no distension. There is no tenderness. There is no guarding.   Musculoskeletal: Normal range of motion. She exhibits no edema or tenderness.   Lymphadenopathy:     She has no cervical adenopathy.   Neurological: She is alert and oriented to person, place, and time.   Skin: Skin is warm. No rash noted. She is not diaphoretic. No erythema.   Psychiatric: She has a normal mood and affect.       Significant Labs:   CBC:   Recent Labs   Lab  09/11/18   1522   WBC  4.58   HGB  9.0*   HCT  27.3*   PLT  140*    and CMP:   Recent Labs   Lab  09/11/18   1522   NA  138   K  3.9   CL  104   CO2  24   GLU  107   BUN  9   CREATININE  0.8   CALCIUM  10.0   PROT  6.5   ALBUMIN  3.3*   BILITOT  0.3   ALKPHOS  119   AST  16   ALT  8*   ANIONGAP  10   EGFRNONAA  >60.0       Diagnostic Results:  I have reviewed all pertinent imaging results/findings within the past 24 hours.    Assessment/Plan:     * AML (acute myeloblastic leukemia)    - Admitted from [facility] on 5/25/18 early AM with WBC around 100s, for suspected new non-M3 AML  - Lo res HLA typing on 5/26/18 and hi res on 5/27/18 done in anticipation of possible need for future transplant   - Pt with two daughters, two full sisters (in their mid 60s, one with brain aneurysm hx, other one without any medical issues), and one full brother (61 y/o healthy).  - NPM1 +, CEBPA (-), FLT3 (+).  On Midostaurin 50 mg PO BID until Day 14 (held starting 6/8 to 6/11). Stopped when FLAG JENNIFER re-induction started. Restarted Midostaurin at 25 mg bid on day 8 of FLAG JENNIFER induction (6/22), increased to full dose 50 mg bid (6/26)  "as improvement in liver enzymes. Stopped 7/3/18 due to abnormal cardiac echo.     - Received 7+3, day 14 bone marrow biopsy completed 6/11 showing persistent disease with 15% CD 34 positive blasts; was reinduced with FLAG-JENNIFER, day 14 restaging bone marrow biopsy done 6/29 without complication, results with no evidence of AML, FLT 3 negative  - Repeat marrow at count recovery 7/31 showed no evidence of AML  - Derm bx of right palm negative for leukemia cutis and sweets  - Admitted on 8/14/18 for planned cycle 1 hidac  - Admitted on 9/11/18 for planned cycle 2 hidac           Chronic pain    - Clarify home pain regimen: MSIR and MS contin listed  - Oxycodone ordered PRN        Chemotherapy induced cardiomyopathy    - Per Dr. Agarwal's last note 8/28/18: "Her LVEF has normalized. We discussed taking metoprolol 12.5 mg nightly to see if she can tolerate it. I would recommend checking an ECG and troponin level monthly while taking midostaurin to monitor the QT interval as well as for any early sign of cardiac toxicity. Recommend repeating her echo in 3 months and with any abnormal troponin level."  - Hold toprol if hypotensive        Pancytopenia due to chemotherapy    - transfuse for hgb <7 and plt <10K        Depression    - continue home lexapro, xanax prn, and ativan prn            VTE Risk Mitigation (From admission, onward)        Ordered     heparin, porcine (PF) 100 unit/mL injection flush 300 Units  As needed (PRN)      09/11/18 1853     Place MAYLIN hose  Until discontinued      09/11/18 8183          Derik Servin MD  Bone Marrow Transplant  Hematology  [facility]      "

## 2018-09-13 LAB
ALBUMIN SERPL BCP-MCNC: 3.1 G/DL
ALP SERPL-CCNC: 97 U/L
ALT SERPL W/O P-5'-P-CCNC: 7 U/L
ANION GAP SERPL CALC-SCNC: 4 MMOL/L
AST SERPL-CCNC: 13 U/L
BASOPHILS # BLD AUTO: 0 K/UL
BASOPHILS NFR BLD: 0 %
BILIRUB SERPL-MCNC: 0.5 MG/DL
BUN SERPL-MCNC: 14 MG/DL
CALCIUM SERPL-MCNC: 9.8 MG/DL
CHLORIDE SERPL-SCNC: 109 MMOL/L
CO2 SERPL-SCNC: 28 MMOL/L
CREAT SERPL-MCNC: 0.8 MG/DL
DIFFERENTIAL METHOD: ABNORMAL
EOSINOPHIL # BLD AUTO: 0 K/UL
EOSINOPHIL NFR BLD: 0 %
ERYTHROCYTE [DISTWIDTH] IN BLOOD BY AUTOMATED COUNT: 16.4 %
EST. GFR  (AFRICAN AMERICAN): >60 ML/MIN/1.73 M^2
EST. GFR  (NON AFRICAN AMERICAN): >60 ML/MIN/1.73 M^2
GLUCOSE SERPL-MCNC: 124 MG/DL
HCT VFR BLD AUTO: 23 %
HGB BLD-MCNC: 7.5 G/DL
IMM GRANULOCYTES # BLD AUTO: 0.05 K/UL
IMM GRANULOCYTES NFR BLD AUTO: 1 %
LYMPHOCYTES # BLD AUTO: 0.2 K/UL
LYMPHOCYTES NFR BLD: 4.2 %
MAGNESIUM SERPL-MCNC: 1.7 MG/DL
MCH RBC QN AUTO: 30.2 PG
MCHC RBC AUTO-ENTMCNC: 32.6 G/DL
MCV RBC AUTO: 93 FL
MONOCYTES # BLD AUTO: 0.3 K/UL
MONOCYTES NFR BLD: 4.8 %
NEUTROPHILS # BLD AUTO: 4.7 K/UL
NEUTROPHILS NFR BLD: 90 %
NRBC BLD-RTO: 0 /100 WBC
PHOSPHATE SERPL-MCNC: 5.5 MG/DL
PLATELET # BLD AUTO: 126 K/UL
PMV BLD AUTO: 10.5 FL
POTASSIUM SERPL-SCNC: 4.6 MMOL/L
PROT SERPL-MCNC: 5.8 G/DL
RBC # BLD AUTO: 2.48 M/UL
SODIUM SERPL-SCNC: 141 MMOL/L
WBC # BLD AUTO: 5.23 K/UL

## 2018-09-13 PROCEDURE — 36415 COLL VENOUS BLD VENIPUNCTURE: CPT

## 2018-09-13 PROCEDURE — 84100 ASSAY OF PHOSPHORUS: CPT

## 2018-09-13 PROCEDURE — 85025 COMPLETE CBC W/AUTO DIFF WBC: CPT

## 2018-09-13 PROCEDURE — 63600175 PHARM REV CODE 636 W HCPCS: Performed by: STUDENT IN AN ORGANIZED HEALTH CARE EDUCATION/TRAINING PROGRAM

## 2018-09-13 PROCEDURE — 99233 SBSQ HOSP IP/OBS HIGH 50: CPT | Mod: ,,, | Performed by: INTERNAL MEDICINE

## 2018-09-13 PROCEDURE — 80053 COMPREHEN METABOLIC PANEL: CPT

## 2018-09-13 PROCEDURE — 20600001 HC STEP DOWN PRIVATE ROOM

## 2018-09-13 PROCEDURE — 83735 ASSAY OF MAGNESIUM: CPT

## 2018-09-13 PROCEDURE — 25000003 PHARM REV CODE 250: Performed by: INTERNAL MEDICINE

## 2018-09-13 PROCEDURE — 25000003 PHARM REV CODE 250: Performed by: NURSE PRACTITIONER

## 2018-09-13 PROCEDURE — 25000003 PHARM REV CODE 250: Performed by: STUDENT IN AN ORGANIZED HEALTH CARE EDUCATION/TRAINING PROGRAM

## 2018-09-13 RX ORDER — PROCHLORPERAZINE MALEATE 5 MG
5 TABLET ORAL 3 TIMES DAILY PRN
Status: DISCONTINUED | OUTPATIENT
Start: 2018-09-13 | End: 2018-09-16 | Stop reason: HOSPADM

## 2018-09-13 RX ORDER — SEVELAMER CARBONATE 800 MG/1
800 TABLET, FILM COATED ORAL
Status: DISCONTINUED | OUTPATIENT
Start: 2018-09-13 | End: 2018-09-16 | Stop reason: HOSPADM

## 2018-09-13 RX ADMIN — METOPROLOL SUCCINATE 12.5 MG: 25 TABLET, EXTENDED RELEASE ORAL at 06:09

## 2018-09-13 RX ADMIN — DEXAMETHASONE 1 DROP: 1 SUSPENSION OPHTHALMIC at 11:09

## 2018-09-13 RX ADMIN — DEXAMETHASONE 1 DROP: 1 SUSPENSION OPHTHALMIC at 05:09

## 2018-09-13 RX ADMIN — PROCHLORPERAZINE MALEATE 5 MG: 5 TABLET, FILM COATED ORAL at 09:09

## 2018-09-13 RX ADMIN — ALPRAZOLAM 0.25 MG: 0.25 TABLET ORAL at 07:09

## 2018-09-13 RX ADMIN — SEVELAMER CARBONATE 800 MG: 800 TABLET, FILM COATED ORAL at 06:09

## 2018-09-13 RX ADMIN — PANTOPRAZOLE SODIUM 40 MG: 40 TABLET, DELAYED RELEASE ORAL at 08:09

## 2018-09-13 RX ADMIN — DEXAMETHASONE 1 DROP: 1 SUSPENSION OPHTHALMIC at 06:09

## 2018-09-13 RX ADMIN — SEVELAMER CARBONATE 800 MG: 800 TABLET, FILM COATED ORAL at 11:09

## 2018-09-13 RX ADMIN — ACYCLOVIR 400 MG: 200 CAPSULE ORAL at 08:09

## 2018-09-13 RX ADMIN — PROCHLORPERAZINE MALEATE 5 MG: 5 TABLET, FILM COATED ORAL at 08:09

## 2018-09-13 RX ADMIN — ESCITALOPRAM OXALATE 10 MG: 10 TABLET ORAL at 08:09

## 2018-09-13 RX ADMIN — SODIUM CHLORIDE: 0.9 INJECTION, SOLUTION INTRAVENOUS at 04:09

## 2018-09-13 NOTE — PROGRESS NOTES
Hematology and Medical Oncology   Follow Up Note     9/11/2018    Primary Oncologic Diagnosis: AML, FLT 3 + and NPMN1+.   History of Present Ilness:   Sabrina Badillo) is a pleasant 68 y.o.female presents to clinic following 2 induction therapies for AML. She was in the hospital roughly 50 days to receive 7+3 and then FLAG JENNIFER.    Oncology History:   68 y.o. female admitted overnight for possible new AML. Came in from OSH with elevated WBC of 100.   05/25/2018: Pt is now on hydrea 2 grams BID, allopurinol 300 mg daily, and IVF. Pt may need rasburicase this weekend. She will undergo a BM bx and aspiration today. She is an induction candidate and will not be screened for research trials. She has anxiety for which she usually takes xanax, this was re-started.   05/26/2018 PICC placed. Reports she slept well last night. Anxiety improved with prn xanax. EF 65%, HIV and Hep panel negative. 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  6/12/2018: Day 15 of 7+3 induction, restaging BM bx done yesterday. On Midostaurin. Fever yesterday and BC with gram + cocci in clusters. On cefepime day 2 and will start Vanc today. Labial mucositis improving.   6/13/2018: Day 16 7+3. BC with staph aureus, identification pending. Surveillance blood cultures done today. Afebrile x 48 hours. On cefepime and Vanc. Labial mucositis resolved. No complaints of pain. Nausea controlled. No diarrhea. Primary complaint is fatigue.   6/14/20018: Day 17 of 7+3. Day 14 bone marrow results pending. BC with staph epidermis only sensitive to Vancomycin. Vanc day 3 and cefepime day 4. Vanc trough 12.2 last night. BP remains low but stable. Afebrile x 72 hours.   6/15/2018: Day 18 of 7+3. Day 14 bone marrow biopsy shows persistent disease with 15% blasts. Discussion with patient regarding treatment and she is deciding if she wants to proceed with re-induction vs outpatient maintenance. Temp of 100.4 overnight, unsustained. Day 5 Cefepime and Day 4 of  Vanc for staph epidermis. Repeat cultures NGTD. BP improved. Electrolytes (mag, phos, K and calcium) replaced aggressively this am and will repeat labs this afternoon. No complaints this am.   06/16/2018: Day 19 of 7+3. Day 2 of Flag-JENNIFER. Remains afebrile. Calcium remains low and have increased PO supplementation. Remains on vanc and aztreonam. Somewhat loopy feeling after receiving benadryl.   06/17/2018: Day 20 of 7+3. Day 3 of FLAG-JENNIFER. Multiple electrolyte abnormalities. New bilateral leg and feet swelling.   6/18/2018: Day 21 of 7+3 and Day 4 of FLAG JENNIFER. Tolerating well with some nausea controlled with Zofran. VSS, afebrile. ANC 1900 on Neupogen. Continues Vanc for staph epi bacteremia. Multiple electrolytes replaced today. Complains of edema to lower extremities, not improved after 20 of lasix yesterday. No sob or CP.   6/19/18: Day 22 of 7+3 and Day 5 of FLAG JENNIFER. VSS, afebrile, ANC 3900. Vanc trough elevated and dose adjusted this am. Lower extremity edema improved after 40 of lasix yesterday, net negative 300 cc I&O. Mild nausea, no abdominal pain or diarrhea. Poor appetite but no difficulty eating or taking pills.   6/20/18: Day 23 of 7+3 and Day 6 of FLAG JENNIFER. Patient complains of nausea and vomiting overnight and this am, especially when taking pills. Will add Zyprexa daily in addition to Zofran, compazine, and ativan PRN and will change meds to IV. 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 Afebrile.  No diarrhea, mouth sores or pain.  06/21/2018: Day 24 of 7+3 and Day 7 of FLAG JENNIFER. Nausea better controlled. Continued on Vanc.  6/22/2018: Day 25 of 7+3 and Day 8 of FLAG JENNIFER. Nausea improved some with Zyprexa but did have 1 episode of emesis overnight.continuing meds IV due to vomiting. Remains afebrile. ANC 0. Continues Vanc and Flagyl. Electrolytes replaced.   06/23/2018 : day 26 of 7+3 and Day 9 of FLAG JENNIFER.  Nausea persists,  worsened after taking pills so meds converted to IV.  Encouraged ambulation.  Continue with flagyl for leptotrichia goodfellowii.  RUQ US showed intrahepatic dilation, overall impression hepatic steatosis.  CBD 0.5cm.  No Gallbladder.    06/24/2018 : day 27 of 7+3 and Day 10 of FLAG JENNIFER.  Nausea persisting, able to tolerate some po pills.  Last day of flagyl (day 5).  Still pancytopenic. Appetite still low as po intake triggers nausea.  06/25/2018: day 28 of 7+3 and Day 11 of FLAG JENNIFER. Afebrile, ANC 0. Has completed Flagyl. Will decrease Vanc to 1000 mg q12, trough 19.9, will complete Vanc on 6/27. Liver enzymes stable on Midostaurin. VSS.   6/26/2018: day 29 of 7+3 and day 12 of FLAG JENNIFER. Liver enzymes improved and Midostaurin increased to full dose 50 mg bid. Nausea controlled, afebrile, VSS, NEON.  6/27/2018: day 30 of 7+3 and Day 13 of FLAG JENNIFER. Persistent nausea and emesis x 1 yesterday. Compazine changed to scheduled. Vanc ends today. Afebrile, VSS. Aggressive electrolyte replacement, low electrolytes possibly due to Midostaurin.   6/28/2018: day 31 of 7+3 and Day 14 of FLAG JENNIFER. Nausea improved with scheduled Compazine. Patient has agreed to start converting some IV meds to po. VSS, afebrile, electrolytes wnl today. Only complains of fatigue, new rash noted to upper back and chest and legs, papular rash mildly red.  6/29/2018: Day 32 of 7+3 and Day 15 of FLAG JENNIFER. Day 14 restaging bone marrow biopsy done at bedside today. Patient states nausea improved but she did have emesis last night after taking 9 pm pills. Rash improved. No mouth sores, diarrhea or pain.   7/2/2018: Day 35 of 7+3 and Day 18 of FLAG JENNIFER. Restaging BM bx results pending. Fluid overload over the weekend. Chest x-ray with pulmonary edema. Os sats down to 88%. Placed on O2 at 2 L NC, off O2 this am. Received lasix. Net negative 2.7 L today. 1 episode of nausea, no emesis. Reports anxiety relieved with PRN meds. Electrolytes improved,  afebrile, VSS.   7/3/2018: Day 36 of 7+3 and Day 19 of FLAG JENNIFER. Restaging Bm bx with GABRIEL. Cardiology consulted for abnormal echo, EF 30% with pulmonary HTN and severe L atrial enlargement. EKG with T wave abnormality, possible anterolateral ischemia and prolonged QTc of 530. Medications adjusted. Nausea controlled, no diarrhea. Electrolytes improved. On O2 as needed.   07/04/2018 : Day 37 of 7+3 and Day 20 of FLAG JENNIFER Diarrhea in AM, watery, no associated abdominal pain, nausea, or vomiting.    07/05/2018: Day 38 of 7+3 ane Day 21 of FLAG JENNIFER. No CP or SOB, cardiology following. On RA. All meds changed to po, denies nausea or vomiting and no diarrhea. VSS, afebrile.   7/6/2018: Day 22 of FLAG JENNIFER. Continues to tolerate po meds with no nausea. Afebrile. Awaiting count recovery for discharge.  7/7/2018-/8/2018: Day 40/41 of 7+3, Day 23/24 of FLAG JENNIFER.  Improving clinically.  Started on mag oxide per patient request.  Labs showing signs of ANC recovery.  7/9/2018: Day 25 FLAG JENNIFER, , continues Neupogen. Received platelets today. Afebrile, VSS. Tolerating all oral meds with no nausea or vomiting. Only complains of fatigue.   7/10/2018: Day 26 FLAG JENNIFER,  today. Had 2 episodes of vomiting and diarrhea last night. Feeling better. Afebrile, VSS.   7/11/2018: Day 27 FLAG JENNIFER,  today. No vomiting or diarrhea overnight and no nausea. Afebrile, VSS. Complains of back pain (bone pain) suspect due to count recovery. Relieved with oxy IR and Zyrtec started.  07/12/2018: Day 28 flag jennifer, engrafted today with ANC of 730. Back pain improved with zyrtec. Scheduled for IT chemo tomorrow at 1:30 pm and discharge home thereafter. Will likely need plt transfusion prior to IT chemo tomorrow   7/13/2018: Day 29 of FLAG JENNIFER. ANC up to 1300 today. Transfusing platelets today prior to LP for IT chemo. Patient continues to complain of bone pain, mostly to back and legs. No nausea, diarrhea, sob. Afebrile and VSS.    --hospitalized 7/23-7/25 for C.diff diarrhea, neutropenic fever.  While inpatient she developed pink blisters on her palm that were concerning for relapse.  --palm biopsy on 7/26/18 for suspicion of leukemia cutis was also negative for sign of relapse  --bone marrow biopsy 7/31/18 was negative for signs of relapse   --Admitted on 8/14/18 for HiDAC#1  --Admitted on 9/11/18 for HiDAC#2    Interval History:   Ms. Mac continues to do well at home. She had a very good last week. Her energy has notably improved and appetite has returned to pre leukemia diagnosis level.     Past Medical History:   Past Medical History:   Diagnosis Date    Cancer 03/2018    AML    CHF (congestive heart failure)     Encounter for blood transfusion        Current Medications:   No current facility-administered medications for this visit.      No current outpatient medications on file.     Facility-Administered Medications Ordered in Other Visits   Medication    0.9%  NaCl infusion    acyclovir capsule 400 mg    ALPRAZolam tablet 0.25 mg    alteplase injection 2 mg    [START ON 9/14/2018] cytarabine (PF) (CYTOSAR) 3,000 mg/m2 = 5,040 mg in sodium chloride 0.9% 250 mL chemo infusion    [START ON 9/16/2018] cytarabine (PF) (CYTOSAR) 3,000 mg/m2 = 5,040 mg in sodium chloride 0.9% 250 mL chemo infusion    dexamethasone 0.1 % ophthalmic solution 1 drop    [START ON 9/14/2018] dexamethasone tablet 8 mg    [START ON 9/16/2018] dexamethasone tablet 8 mg    dextrose 50% injection 12.5 g    dextrose 50% injection 25 g    escitalopram oxalate tablet 10 mg    glucagon (human recombinant) injection 1 mg    glucose chewable tablet 16 g    glucose chewable tablet 24 g    heparin, porcine (PF) 100 unit/mL injection flush 300 Units    metoprolol succinate (TOPROL-XL) 24 hr split tablet 12.5 mg    [START ON 9/14/2018] ondansetron tablet 8 mg    [START ON 9/16/2018] ondansetron tablet 8 mg    oxyCODONE immediate release tablet 5 mg     pantoprazole EC tablet 40 mg    prochlorperazine tablet 5 mg    sevelamer carbonate tablet 800 mg    sodium chloride 0.9% flush 10 mL    sodium chloride 0.9% flush 5 mL     ALLERGIES:   Review of patient's allergies indicates:   Allergen Reactions    Methotrexate analogues      Elevated Liver Enzyme    Bactrim [sulfamethoxazole-trimethoprim] Nausea And Vomiting and Rash       Review of Systems:     Review of Systems   Constitutional: Positive for appetite change and fatigue. Negative for chills, diaphoresis, fever and unexpected weight change.   HENT:   Negative for hearing loss, mouth sores, nosebleeds, sore throat, trouble swallowing and voice change.    Eyes: Negative for eye problems and icterus.   Respiratory: Negative for chest tightness, cough, hemoptysis, shortness of breath and wheezing.    Cardiovascular: Negative for chest pain, leg swelling and palpitations.   Gastrointestinal: Negative for abdominal distention, abdominal pain, blood in stool, diarrhea, nausea and vomiting.   Endocrine: Negative for hot flashes.   Genitourinary: Negative for bladder incontinence, difficulty urinating, dysuria and hematuria.    Musculoskeletal: Negative for arthralgias, back pain, flank pain, gait problem, myalgias, neck pain and neck stiffness.   Skin: Negative for itching, rash and wound.   Neurological: Negative for dizziness, extremity weakness, gait problem, headaches, numbness, seizures and speech difficulty.   Hematological: Negative for adenopathy. Does not bruise/bleed easily.   Psychiatric/Behavioral: Positive for sleep disturbance. Negative for confusion and depression. The patient is nervous/anxious.           Physical Exam:     Vitals:    09/11/18 1526   BP: 138/62   Pulse: 89   Resp: 16   Temp: 98.2 °F (36.8 °C)       Physical Exam   Constitutional: She is oriented to person, place, and time. She appears well-developed and well-nourished. No distress.   HENT:   Head: Normocephalic and atraumatic.    Mouth/Throat: Oropharynx is clear and moist. No oropharyngeal exudate.   No hair, wearing a cap     Eyes: Conjunctivae and EOM are normal. Pupils are equal, round, and reactive to light.   Neck: Normal range of motion. Neck supple. No JVD present. No tracheal deviation present. No thyromegaly present.   Cardiovascular: Normal rate, regular rhythm and normal heart sounds. Exam reveals no friction rub.   No murmur heard.  Pulmonary/Chest: Effort normal and breath sounds normal. No stridor. No respiratory distress. She has no wheezes. She has no rales. She exhibits no tenderness.   Abdominal: Soft. Bowel sounds are normal. She exhibits no distension. There is no tenderness. There is no rebound and no guarding.   Musculoskeletal: Normal range of motion. She exhibits no edema, tenderness or deformity.   Lymphadenopathy:     She has no axillary adenopathy.   Neurological: She is alert and oriented to person, place, and time. She displays normal reflexes. No cranial nerve deficit or sensory deficit. She exhibits normal muscle tone. Coordination normal.   Skin: Skin is warm and dry. Capillary refill takes less than 2 seconds. No rash noted. She is not diaphoretic. No erythema. No pallor.   Raised discrete erythematous lesions on bilateral palms - resolved   Psychiatric: She has a normal mood and affect. Her behavior is normal. Judgment and thought content normal.       ECOG Performance Status: (foot note - ECOG PS provided by Eastern Cooperative Oncology Group) 2 - Symptomatic, <50% confined to bed    Karnofsky Performance Score:  70%- Cares for Self: Unable to Carry on Normal Activity or Active Work    Labs:   Lab Results   Component Value Date    WBC 5.23 09/13/2018    HGB 7.5 (L) 09/13/2018    HCT 23.0 (L) 09/13/2018     (L) 09/13/2018    ALT 7 (L) 09/13/2018    AST 13 09/13/2018     09/13/2018    K 4.6 09/13/2018     09/13/2018    CREATININE 0.8 09/13/2018    BUN 14 09/13/2018    CO2 28 09/13/2018     INR 1.1 05/31/2018       Imaging: previous imaging has been reviewed    Assessment and Plan:     Mrs. Mac is a pleasant 68 year old female with recently diagnosed AML.      Acute Monocytic Leukemia  -- Admitted from [facility] on [facility]18 early AM with WBC around 100s, for suspected new non-M3 AML  --lo res HLA typing on 5/26/18 and hi res on 5/27/18 done in anticipation of possible need for future transplant   --Pt with two daughters, two full sisters (in their mid 60s, one with brain aneurysm hx, other one without any medical issues), and one full brother (59 y/o healthy).  --NPM1 +, CEBPA (-), FLT3 (+).  On Midostaurin 50 mg PO BID until Day 14 (held starting 6/8 to 6/11). Stopped when FLAG JENNIFER re-induction started. Restarted Midostaurin at 25 mg bid on day 8 of FLAG JENNIFER induction (6/22), increase to full dose 50 mg bid (6/26) as improvement in liver enzymes. Stopped 7/3/18 due to abnormal cardiac echo.  --day 14 bone marrow biopsy completed 6/11 showing persistent disease with 15% CD 34 positive blasts  --Day 60 of FLAG-JENNIFER, tolerating without difficulty except nausea/vomiting  --day 14 restaging bone marrow biopsy done 6/29 without complication, results with no evidence of AML, FLT 3 negative, will need repeat marrow at count recovery outpatient   --recovery marrow showed no evidence of disease  --HiDAC #1 on 8/14/18  --Admit today for HiDAC#2     Chemotherapy related anemia/thromboctopenia   ----Between consolidation therapies mon/thurs labs locally with the reports sent to me  --We were able to transfuse blood locally    Suspected Leukemia Cutis  --pathology from dermatology confirmed inflammatory changes  --now resolved with topical steroid cream    Abnormal Liver Function Tests  --ALT and AST have now normalized. Alk phos 119 today.   --midostaurin started day 8 at 25 mg bid, monitoring liver enzymes closely and improved. Increasing Midostaurin to 50 mg bid 6/26. Stopped Midostaurin (7/3) due to  new diagnosis of systolic heart failure EF 35%.  --6/22/18 liver US showing mild intrahepatic dilation, otherwise impression of hepatic steatosis.  Will hold off of MRCP at this time.     --continue to monitor alk phos    Chemotherapy induced cardiomyopathy  --cardiology follow-up as outpatient in 3 months  --repeat 2D echo on 6/15 with preserved EF of 60%.  However 7/2/18 echo with reduced EF 30-35%  --Echo on 8/15/18 was within normal range EF of 60-65%    30 minutes were spent face to face with the patient and her  family to discuss the disease, natural history, treatment options and survival statistics. I have provided the patient with an opportunity to ask questions and have all questions answered to her satisfaction.       she will return to clinic in 2 weeks for symptoms check, but knows to call in the interim if symptoms change or should a problem arise.        Laquita Troy MD  Hematology and Medical Oncology  Bone Marrow Transplant  [facility]

## 2018-09-13 NOTE — PROGRESS NOTES
[facility]  Hematology  Bone Marrow Transplant  Progress Note    Patient Name: Sabrina Mac  Admission Date: 9/11/2018  Hospital Length of Stay: 2 days  Code Status: Full Code  Patient information was obtained from patient, past medical records and ER records.     Interval Hx: NAEON. Continuing HiDac cycle 2. Holding AM dose of metoprolol for HR 60    Oncology History:    68 y.o. female admitted with AML. Originally came in from OSH with elevated WBC of 100 on 05/25/2018.  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  -5/28/18: started 7+3 induction  -6/11/18: restaging BM bx done, shows persistent disease with 15% blasts. Discussion with patient regarding treatment and she is deciding if she wants to proceed with re-induction vs outpatient maintenance.   -06/15/2018: started Flag-JENNIFER.   --hospitalized 7/23-7/25 for C.diff diarrhea, neutropenic fever.  While inpatient she developed pink blisters on her palm that were concerning for relapse.  --palm biopsy on 7/26/18 for suspicion of leukemia cutis was also negative for sign of relapse  --bone marrow biopsy 7/31/18 was negative for signs of relapse   --Admitted on 8/14/18 for HiDAC#1  --Admitted on 9/11/18 for HiDAC#2    Medications Prior to Admission   Medication Sig Dispense Refill Last Dose    acyclovir (ZOVIRAX) 200 MG capsule Take 2 capsules (400 mg total) by mouth 2 (two) times daily. 120 capsule 11 9/11/2018 at Unknown time    ciprofloxacin HCl (CIPRO) 500 MG tablet Take 1 tablet (500 mg total) by mouth every 12 (twelve) hours. 60 tablet 3 9/11/2018 at Unknown time    escitalopram oxalate (LEXAPRO) 10 MG tablet Take 10 mg by mouth once daily.   9/11/2018 at Unknown time    fluconazole (DIFLUCAN) 200 MG Tab Take 2 tablets (400 mg total) by mouth once daily. 60 tablet 3 Past Week at Unknown time    magnesium oxide (MAG-OX) 400 mg tablet Take 2 tablets (800 mg total) by mouth 2 (two) times daily. 120 tablet 1 9/11/2018 at Unknown time     metoprolol succinate (TOPROL-XL) 25 MG 24 hr tablet Take 0.5 tablets (12.5 mg total) by mouth once daily. 30 tablet 3 9/11/2018 at Unknown time    omeprazole (PRILOSEC OTC) 20 MG tablet Take 20 mg by mouth every morning.   9/11/2018 at Unknown time    ondansetron (ZOFRAN) 8 MG tablet Take 1 tablet (8 mg total) by mouth every 12 (twelve) hours as needed for Nausea. 30 tablet 2 Past Month at Unknown time    prochlorperazine (COMPAZINE) 10 MG tablet Take 1 tablet (10 mg total) by mouth 4 (four) times daily. 60 tablet 3 Past Month at Unknown time    vitamin D 1000 units Tab Take 1 tablet (1,000 Units total) by mouth once daily. 30 tablet 2 9/11/2018 at Unknown time    ALPRAZolam (XANAX) 0.25 MG tablet Take 1 tablet (0.25 mg total) by mouth 3 (three) times daily as needed for Anxiety. 40 tablet 1 Taking    hydrocortisone 2.5 % cream Apply topically 2 (two) times daily. Apply to affected area on palms for 10 days 28 g 3 Taking    LORazepam (ATIVAN) 0.5 MG tablet Take 1 tablet (0.5 mg total) by mouth every 6 (six) hours as needed (nausea). 60 tablet 0 Unknown at Unknown time    midostaurin (RYDAPT) 25 mg Cap Take 50 mg by mouth 2 (two) times daily on days 8 to 21 of cycle 56 capsule 2 Unknown at Unknown time    morphine (MS CONTIN) 15 MG 12 hr tablet Take 2 tablets (30 mg total) by mouth 2 (two) times daily. 60 tablet 0 Unknown at Unknown time    morphine (MSIR) 15 MG tablet Take 1 tablet (15 mg total) by mouth every 4 (four) hours as needed. 40 tablet 0 Unknown at Unknown time       Methotrexate analogues; Sulfa (sulfonamide antibiotics); and Bactrim [sulfamethoxazole-trimethoprim]     Past Medical History:   Diagnosis Date    Cancer 03/2018    AML    CHF (congestive heart failure)     Encounter for blood transfusion      Past Surgical History:   Procedure Laterality Date    CHOLECYSTECTOMY      HYSTERECTOMY      TONSILLECTOMY       Family History     Problem Relation (Age of Onset)    Cancer Mother,  Father        Tobacco Use    Smoking status: Former Smoker     Packs/day: 0.50     Years: 10.00     Pack years: 5.00     Types: Cigarettes     Last attempt to quit: 3/25/2017     Years since quittin.4    Smokeless tobacco: Never Used   Substance and Sexual Activity    Alcohol use: Yes     Alcohol/week: 0.6 oz     Types: 1 Shots of liquor per week    Drug use: No    Sexual activity: Yes     Partners: Female       Review of Systems   Constitutional: Negative for appetite change, chills, fatigue and fever.   HENT: Negative for sinus pressure, sinus pain and sore throat.    Eyes: Negative for photophobia.   Respiratory: Negative for cough and shortness of breath.    Cardiovascular: Negative for chest pain and leg swelling.   Gastrointestinal: Negative for abdominal pain, constipation, diarrhea, nausea and vomiting.   Genitourinary: Negative for dysuria.   Musculoskeletal: Negative for arthralgias, back pain and myalgias.   Skin: Negative for rash.   Neurological: Negative for dizziness, syncope, light-headedness, numbness and headaches.   Hematological: Does not bruise/bleed easily.   Psychiatric/Behavioral: Negative for agitation and confusion.     Objective:     Vital Signs (Most Recent):  Temp: 98.3 °F (36.8 °C) (18)  Pulse: 62 (18)  Resp: 16 (18)  BP: 133/60 (18)  SpO2: 97 % (18) Vital Signs (24h Range):  Temp:  [98.3 °F (36.8 °C)-98.4 °F (36.9 °C)] 98.3 °F (36.8 °C)  Pulse:  [62-91] 62  Resp:  [16-18] 16  SpO2:  [96 %-97 %] 97 %  BP: (117-150)/(56-65) 133/60     Weight: 63.3 kg (139 lb 8.8 oz)  Body mass index is 24.72 kg/m².  Body surface area is 1.68 meters squared.    ECOG SCORE         [unfilled]    Lines/Drains/Airways     Peripheral Intravenous Line                 Peripheral IV - Single Lumen Anterior;Right Antecubital -- days                Physical Exam   Constitutional: She is oriented to person, place, and time. She appears well-nourished. No  distress.   HENT:   Head: Normocephalic and atraumatic.   alopecia   Eyes: Conjunctivae and EOM are normal. No scleral icterus.   Neck: Normal range of motion. Neck supple.   Cardiovascular: Normal rate and regular rhythm.   No murmur heard.  Pulmonary/Chest: Effort normal. No respiratory distress. She has no wheezes. She has no rales.   Abdominal: Soft. She exhibits no distension. There is no tenderness. There is no guarding.   Musculoskeletal: Normal range of motion. She exhibits no edema or tenderness.   Lymphadenopathy:     She has no cervical adenopathy.   Neurological: She is alert and oriented to person, place, and time.   Skin: Skin is warm. No rash noted. She is not diaphoretic. No erythema.   Psychiatric: She has a normal mood and affect.       Significant Labs:   CBC:   Recent Labs   Lab  09/11/18   1522  09/12/18   0514  09/13/18   0540   WBC  4.58  4.15  5.23   HGB  9.0*  8.2*  7.5*   HCT  27.3*  25.1*  23.0*   PLT  140*  131*  126*    and CMP:   Recent Labs   Lab  09/11/18   1522  09/12/18   0514  09/13/18   0540   NA  138  139  141   K  3.9  4.9  4.6   CL  104  108  109   CO2  24  23  28   GLU  107  138*  124*   BUN  9  10  14   CREATININE  0.8  0.9  0.8   CALCIUM  10.0  10.0  9.8   PROT  6.5  6.2  5.8*   ALBUMIN  3.3*  3.2*  3.1*   BILITOT  0.3  0.3  0.5   ALKPHOS  119  110  97   AST  16  13  13   ALT  8*  7*  7*   ANIONGAP  10  8  4*   EGFRNONAA  >60.0  >60.0  >60.0       Diagnostic Results:  I have reviewed all pertinent imaging results/findings within the past 24 hours.    Assessment/Plan:     * AML (acute myeloblastic leukemia)    - Admitted from [facility] on 5/25/18 early AM with WBC around 100s, for suspected new non-M3 AML  - Lo res HLA typing on 5/26/18 and hi res on 5/27/18 done in anticipation of possible need for future transplant   - Pt with two daughters, two full sisters (in their mid 60s, one with brain aneurysm hx, other one without any medical issues), and one full brother (60  "y/o healthy).  - NPM1 +, CEBPA (-), FLT3 (+).  On Midostaurin 50 mg PO BID until Day 14 (held starting 6/8 to 6/11). Stopped when FLAG JENNIFER re-induction started. Restarted Midostaurin at 25 mg bid on day 8 of FLAG JENNIFER induction (6/22), increased to full dose 50 mg bid (6/26) as improvement in liver enzymes. Stopped 7/3/18 due to abnormal cardiac echo.     - Received 7+3, day 14 bone marrow biopsy completed 6/11 showing persistent disease with 15% CD 34 positive blasts; was reinduced with FLAG-JENNIFER, day 14 restaging bone marrow biopsy done 6/29 without complication, results with no evidence of AML, FLT 3 negative  - Repeat marrow at count recovery 7/31 showed no evidence of AML  - Derm bx of right palm negative for leukemia cutis and sweets  - Admitted on 8/14/18 for planned cycle 1 hidac  - Admitted on 9/11/18 for planned cycle 2 hidac   - uric acid 4.8 wnl, elevated phos 5.5--started sevelamer        Chronic pain    - home pain regimen: MSIR and MS contin listed  - Oxycodone ordered PRN        Chemotherapy induced cardiomyopathy    - Per Dr. Agarwal's last note 8/28/18: "Her LVEF has normalized. We discussed taking metoprolol 12.5 mg nightly to see if she can tolerate it. I would recommend checking an ECG and troponin level monthly while taking midostaurin to monitor the QT interval as well as for any early sign of cardiac toxicity. Recommend repeating her echo in 3 months and with any abnormal troponin level."  - Hold toprol if hypotensive        Pancytopenia due to chemotherapy    - transfuse for hgb <7 and plt <10K        Depression    - continue home lexapro, xanax prn, and ativan prn            VTE Risk Mitigation (From admission, onward)        Ordered     heparin, porcine (PF) 100 unit/mL injection flush 300 Units  As needed (PRN)      09/11/18 1174     Place MAYLIN hose  Until discontinued      09/11/18 1503          Disposition: d/c Monday marvin Barboza MD  Bone Marrow Transplant  [facility] [place]

## 2018-09-13 NOTE — PLAN OF CARE
Problem: Patient Care Overview  Goal: Plan of Care Review  Outcome: Ongoing (interventions implemented as appropriate)  POC reviewed with patient; understanding verbalized. Dexamethasone drops administered as ordered. Pt remains independent. She voids in the BSC; two BMs this shift. Regular diet with poor appetite. NS at 50mL. Pt C/O nausea and  and received PRN Compazine X1. Pt. with nonskid footwear on, bed in lowest position, and locked with bed rails up x2. Pt. has call light and personal items within reach. VSS and afebrile this shift. All questions and concerns addressed at this time. Will continue to monitor.

## 2018-09-13 NOTE — ASSESSMENT & PLAN NOTE
- Admitted from [facility] on 5/25/18 early AM with WBC around 100s, for suspected new non-M3 AML  - Lo res HLA typing on 5/26/18 and hi res on 5/27/18 done in anticipation of possible need for future transplant   - Pt with two daughters, two full sisters (in their mid 60s, one with brain aneurysm hx, other one without any medical issues), and one full brother (61 y/o healthy).  - NPM1 +, CEBPA (-), FLT3 (+).  On Midostaurin 50 mg PO BID until Day 14 (held starting 6/8 to 6/11). Stopped when FLAG JENNIFER re-induction started. Restarted Midostaurin at 25 mg bid on day 8 of FLAG JENNIFER induction (6/22), increased to full dose 50 mg bid (6/26) as improvement in liver enzymes. Stopped 7/3/18 due to abnormal cardiac echo.     - Received 7+3, day 14 bone marrow biopsy completed 6/11 showing persistent disease with 15% CD 34 positive blasts; was reinduced with FLAG-JENNIFER, day 14 restaging bone marrow biopsy done 6/29 without complication, results with no evidence of AML, FLT 3 negative  - Repeat marrow at count recovery 7/31 showed no evidence of AML  - Derm bx of right palm negative for leukemia cutis and sweets  - Admitted on 8/14/18 for planned cycle 1 hidac  - Admitted on 9/11/18 for planned cycle 2 hidac   - uric acid 4.8 wnl, elevated phos 5.5--started sevelamer

## 2018-09-13 NOTE — SUBJECTIVE & OBJECTIVE
Patient information was obtained from patient, past medical records and ER records.     Interval Hx: NAEON. Continuing HiDac cycle 2. Holding AM dose of metoprolol for HR 60    Oncology History:    68 y.o. female admitted with AML. Originally came in from [facility] with elevated WBC of 100 on 05/25/2018.  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  -5/28/18: started 7+3 induction  -6/11/18: restaging BM bx done, shows persistent disease with 15% blasts. Discussion with patient regarding treatment and she is deciding if she wants to proceed with re-induction vs outpatient maintenance.   -06/15/2018: started Flag-JENNIFER.   --hospitalized 7/23-7/25 for C.diff diarrhea, neutropenic fever.  While inpatient she developed pink blisters on her palm that were concerning for relapse.  --palm biopsy on 7/26/18 for suspicion of leukemia cutis was also negative for sign of relapse  --bone marrow biopsy 7/31/18 was negative for signs of relapse   --Admitted on 8/14/18 for HiDAC#1  --Admitted on 9/11/18 for HiDAC#2    Medications Prior to Admission   Medication Sig Dispense Refill Last Dose    acyclovir (ZOVIRAX) 200 MG capsule Take 2 capsules (400 mg total) by mouth 2 (two) times daily. 120 capsule 11 9/11/2018 at Unknown time    ciprofloxacin HCl (CIPRO) 500 MG tablet Take 1 tablet (500 mg total) by mouth every 12 (twelve) hours. 60 tablet 3 9/11/2018 at Unknown time    escitalopram oxalate (LEXAPRO) 10 MG tablet Take 10 mg by mouth once daily.   9/11/2018 at Unknown time    fluconazole (DIFLUCAN) 200 MG Tab Take 2 tablets (400 mg total) by mouth once daily. 60 tablet 3 Past Week at Unknown time    magnesium oxide (MAG-OX) 400 mg tablet Take 2 tablets (800 mg total) by mouth 2 (two) times daily. 120 tablet 1 9/11/2018 at Unknown time    metoprolol succinate (TOPROL-XL) 25 MG 24 hr tablet Take 0.5 tablets (12.5 mg total) by mouth once daily. 30 tablet 3 9/11/2018 at Unknown time    omeprazole (PRILOSEC OTC) 20 MG tablet Take 20 mg  by mouth every morning.   9/11/2018 at Unknown time    ondansetron (ZOFRAN) 8 MG tablet Take 1 tablet (8 mg total) by mouth every 12 (twelve) hours as needed for Nausea. 30 tablet 2 Past Month at Unknown time    prochlorperazine (COMPAZINE) 10 MG tablet Take 1 tablet (10 mg total) by mouth 4 (four) times daily. 60 tablet 3 Past Month at Unknown time    vitamin D 1000 units Tab Take 1 tablet (1,000 Units total) by mouth once daily. 30 tablet 2 9/11/2018 at Unknown time    ALPRAZolam (XANAX) 0.25 MG tablet Take 1 tablet (0.25 mg total) by mouth 3 (three) times daily as needed for Anxiety. 40 tablet 1 Taking    hydrocortisone 2.5 % cream Apply topically 2 (two) times daily. Apply to affected area on palms for 10 days 28 g 3 Taking    LORazepam (ATIVAN) 0.5 MG tablet Take 1 tablet (0.5 mg total) by mouth every 6 (six) hours as needed (nausea). 60 tablet 0 Unknown at Unknown time    midostaurin (RYDAPT) 25 mg Cap Take 50 mg by mouth 2 (two) times daily on days 8 to 21 of cycle 56 capsule 2 Unknown at Unknown time    morphine (MS CONTIN) 15 MG 12 hr tablet Take 2 tablets (30 mg total) by mouth 2 (two) times daily. 60 tablet 0 Unknown at Unknown time    morphine (MSIR) 15 MG tablet Take 1 tablet (15 mg total) by mouth every 4 (four) hours as needed. 40 tablet 0 Unknown at Unknown time       Methotrexate analogues; Sulfa (sulfonamide antibiotics); and Bactrim [sulfamethoxazole-trimethoprim]     Past Medical History:   Diagnosis Date    Cancer 03/2018    AML    CHF (congestive heart failure)     Encounter for blood transfusion      Past Surgical History:   Procedure Laterality Date    CHOLECYSTECTOMY      HYSTERECTOMY      TONSILLECTOMY       Family History     Problem Relation (Age of Onset)    Cancer Mother, Father        Tobacco Use    Smoking status: Former Smoker     Packs/day: 0.50     Years: 10.00     Pack years: 5.00     Types: Cigarettes     Last attempt to quit: 3/25/2017     Years since quitting:  1.4    Smokeless tobacco: Never Used   Substance and Sexual Activity    Alcohol use: Yes     Alcohol/week: 0.6 oz     Types: 1 Shots of liquor per week    Drug use: No    Sexual activity: Yes     Partners: Female       Review of Systems   Constitutional: Negative for appetite change, chills, fatigue and fever.   HENT: Negative for sinus pressure, sinus pain and sore throat.    Eyes: Negative for photophobia.   Respiratory: Negative for cough and shortness of breath.    Cardiovascular: Negative for chest pain and leg swelling.   Gastrointestinal: Negative for abdominal pain, constipation, diarrhea, nausea and vomiting.   Genitourinary: Negative for dysuria.   Musculoskeletal: Negative for arthralgias, back pain and myalgias.   Skin: Negative for rash.   Neurological: Negative for dizziness, syncope, light-headedness, numbness and headaches.   Hematological: Does not bruise/bleed easily.   Psychiatric/Behavioral: Negative for agitation and confusion.     Objective:     Vital Signs (Most Recent):  Temp: 98.3 °F (36.8 °C) (09/13/18 0705)  Pulse: 62 (09/13/18 0705)  Resp: 16 (09/13/18 0705)  BP: 133/60 (09/13/18 0705)  SpO2: 97 % (09/13/18 0705) Vital Signs (24h Range):  Temp:  [98.3 °F (36.8 °C)-98.4 °F (36.9 °C)] 98.3 °F (36.8 °C)  Pulse:  [62-91] 62  Resp:  [16-18] 16  SpO2:  [96 %-97 %] 97 %  BP: (117-150)/(56-65) 133/60     Weight: 63.3 kg (139 lb 8.8 oz)  Body mass index is 24.72 kg/m².  Body surface area is 1.68 meters squared.    ECOG SCORE         [unfilled]    Lines/Drains/Airways     Peripheral Intravenous Line                 Peripheral IV - Single Lumen Anterior;Right Antecubital -- days                Physical Exam   Constitutional: She is oriented to person, place, and time. She appears well-nourished. No distress.   HENT:   Head: Normocephalic and atraumatic.   alopecia   Eyes: Conjunctivae and EOM are normal. No scleral icterus.   Neck: Normal range of motion. Neck supple.   Cardiovascular: Normal rate  and regular rhythm.   No murmur heard.  Pulmonary/Chest: Effort normal. No respiratory distress. She has no wheezes. She has no rales.   Abdominal: Soft. She exhibits no distension. There is no tenderness. There is no guarding.   Musculoskeletal: Normal range of motion. She exhibits no edema or tenderness.   Lymphadenopathy:     She has no cervical adenopathy.   Neurological: She is alert and oriented to person, place, and time.   Skin: Skin is warm. No rash noted. She is not diaphoretic. No erythema.   Psychiatric: She has a normal mood and affect.       Significant Labs:   CBC:   Recent Labs   Lab  09/11/18   1522  09/12/18   0514  09/13/18   0540   WBC  4.58  4.15  5.23   HGB  9.0*  8.2*  7.5*   HCT  27.3*  25.1*  23.0*   PLT  140*  131*  126*    and CMP:   Recent Labs   Lab  09/11/18   1522  09/12/18   0514  09/13/18   0540   NA  138  139  141   K  3.9  4.9  4.6   CL  104  108  109   CO2  24  23  28   GLU  107  138*  124*   BUN  9  10  14   CREATININE  0.8  0.9  0.8   CALCIUM  10.0  10.0  9.8   PROT  6.5  6.2  5.8*   ALBUMIN  3.3*  3.2*  3.1*   BILITOT  0.3  0.3  0.5   ALKPHOS  119  110  97   AST  16  13  13   ALT  8*  7*  7*   ANIONGAP  10  8  4*   EGFRNONAA  >60.0  >60.0  >60.0       Diagnostic Results:  I have reviewed all pertinent imaging results/findings within the past 24 hours.

## 2018-09-13 NOTE — PLAN OF CARE
Problem: Patient Care Overview  Goal: Plan of Care Review  Outcome: Ongoing (interventions implemented as appropriate)  Patient remains free from falls and injury this shift. Bed in low, locked position with call bell in reach. Patient encouraged to call for assistance when getting out of bed. Patient verbalized understanding. All belongings within reach. VS stable. Afebrile. Complained of HA 7/10- oxycodone given x1 with relief. No other complaints thus far. Will continue to monitor.

## 2018-09-14 DIAGNOSIS — C93.01 ACUTE MONOCYTIC LEUKEMIA IN REMISSION: Primary | ICD-10-CM

## 2018-09-14 DIAGNOSIS — C92.00 AML (ACUTE MYELOBLASTIC LEUKEMIA): Primary | ICD-10-CM

## 2018-09-14 PROBLEM — R19.7 DIARRHEA: Status: ACTIVE | Noted: 2018-09-14

## 2018-09-14 PROBLEM — R19.7 DIARRHEA: Status: RESOLVED | Noted: 2018-09-14 | Resolved: 2018-09-14

## 2018-09-14 LAB
ALBUMIN SERPL BCP-MCNC: 3.1 G/DL
ALP SERPL-CCNC: 88 U/L
ALT SERPL W/O P-5'-P-CCNC: 7 U/L
ANION GAP SERPL CALC-SCNC: 9 MMOL/L
ANISOCYTOSIS BLD QL SMEAR: SLIGHT
AST SERPL-CCNC: 14 U/L
BASOPHILS # BLD AUTO: ABNORMAL K/UL
BASOPHILS NFR BLD: 1 %
BILIRUB SERPL-MCNC: 0.7 MG/DL
BUN SERPL-MCNC: 11 MG/DL
CALCIUM SERPL-MCNC: 10 MG/DL
CHLORIDE SERPL-SCNC: 109 MMOL/L
CO2 SERPL-SCNC: 26 MMOL/L
CREAT SERPL-MCNC: 0.7 MG/DL
DIFFERENTIAL METHOD: ABNORMAL
EOSINOPHIL # BLD AUTO: ABNORMAL K/UL
EOSINOPHIL NFR BLD: 0 %
ERYTHROCYTE [DISTWIDTH] IN BLOOD BY AUTOMATED COUNT: 16.7 %
EST. GFR  (AFRICAN AMERICAN): >60 ML/MIN/1.73 M^2
EST. GFR  (NON AFRICAN AMERICAN): >60 ML/MIN/1.73 M^2
GLUCOSE SERPL-MCNC: 90 MG/DL
HCT VFR BLD AUTO: 22.8 %
HGB BLD-MCNC: 7.5 G/DL
HYPOCHROMIA BLD QL SMEAR: ABNORMAL
IMM GRANULOCYTES # BLD AUTO: ABNORMAL K/UL
IMM GRANULOCYTES NFR BLD AUTO: ABNORMAL %
LYMPHOCYTES # BLD AUTO: ABNORMAL K/UL
LYMPHOCYTES NFR BLD: 6 %
MAGNESIUM SERPL-MCNC: 1.6 MG/DL
MCH RBC QN AUTO: 30.2 PG
MCHC RBC AUTO-ENTMCNC: 32.9 G/DL
MCV RBC AUTO: 92 FL
MONOCYTES # BLD AUTO: ABNORMAL K/UL
MONOCYTES NFR BLD: 4 %
NEUTROPHILS NFR BLD: 89 %
NRBC BLD-RTO: 0 /100 WBC
OVALOCYTES BLD QL SMEAR: ABNORMAL
PHOSPHATE SERPL-MCNC: 5.2 MG/DL
PLATELET # BLD AUTO: 123 K/UL
PMV BLD AUTO: 10.3 FL
POIKILOCYTOSIS BLD QL SMEAR: SLIGHT
POLYCHROMASIA BLD QL SMEAR: ABNORMAL
POTASSIUM SERPL-SCNC: 4.4 MMOL/L
PROT SERPL-MCNC: 5.7 G/DL
RBC # BLD AUTO: 2.48 M/UL
SODIUM SERPL-SCNC: 144 MMOL/L
URATE SERPL-MCNC: 6 MG/DL
WBC # BLD AUTO: 1.87 K/UL

## 2018-09-14 PROCEDURE — 80053 COMPREHEN METABOLIC PANEL: CPT

## 2018-09-14 PROCEDURE — 84550 ASSAY OF BLOOD/URIC ACID: CPT

## 2018-09-14 PROCEDURE — 63600175 PHARM REV CODE 636 W HCPCS: Performed by: STUDENT IN AN ORGANIZED HEALTH CARE EDUCATION/TRAINING PROGRAM

## 2018-09-14 PROCEDURE — 25000003 PHARM REV CODE 250: Performed by: HOSPITALIST

## 2018-09-14 PROCEDURE — 85027 COMPLETE CBC AUTOMATED: CPT

## 2018-09-14 PROCEDURE — 25000003 PHARM REV CODE 250: Performed by: NURSE PRACTITIONER

## 2018-09-14 PROCEDURE — 25000003 PHARM REV CODE 250: Performed by: STUDENT IN AN ORGANIZED HEALTH CARE EDUCATION/TRAINING PROGRAM

## 2018-09-14 PROCEDURE — 84100 ASSAY OF PHOSPHORUS: CPT

## 2018-09-14 PROCEDURE — 36415 COLL VENOUS BLD VENIPUNCTURE: CPT

## 2018-09-14 PROCEDURE — 25000003 PHARM REV CODE 250: Performed by: INTERNAL MEDICINE

## 2018-09-14 PROCEDURE — 99233 SBSQ HOSP IP/OBS HIGH 50: CPT | Mod: GC,,, | Performed by: INTERNAL MEDICINE

## 2018-09-14 PROCEDURE — 63600175 PHARM REV CODE 636 W HCPCS: Performed by: INTERNAL MEDICINE

## 2018-09-14 PROCEDURE — 85007 BL SMEAR W/DIFF WBC COUNT: CPT

## 2018-09-14 PROCEDURE — 83735 ASSAY OF MAGNESIUM: CPT

## 2018-09-14 PROCEDURE — 20600001 HC STEP DOWN PRIVATE ROOM

## 2018-09-14 RX ORDER — LANOLIN ALCOHOL/MO/W.PET/CERES
400 CREAM (GRAM) TOPICAL
Status: COMPLETED | OUTPATIENT
Start: 2018-09-14 | End: 2018-09-14

## 2018-09-14 RX ORDER — ONDANSETRON 4 MG/1
4 TABLET, FILM COATED ORAL ONCE
Status: COMPLETED | OUTPATIENT
Start: 2018-09-14 | End: 2018-09-14

## 2018-09-14 RX ORDER — MAGNESIUM SULFATE HEPTAHYDRATE 40 MG/ML
2 INJECTION, SOLUTION INTRAVENOUS ONCE
Status: DISCONTINUED | OUTPATIENT
Start: 2018-09-14 | End: 2018-09-14

## 2018-09-14 RX ADMIN — ACYCLOVIR 400 MG: 200 CAPSULE ORAL at 08:09

## 2018-09-14 RX ADMIN — ONDANSETRON 8 MG: 4 TABLET, FILM COATED ORAL at 04:09

## 2018-09-14 RX ADMIN — ACYCLOVIR 400 MG: 200 CAPSULE ORAL at 09:09

## 2018-09-14 RX ADMIN — CYTARABINE 5040 MG: 100 INJECTION, SOLUTION INTRATHECAL; INTRAVENOUS; SUBCUTANEOUS at 04:09

## 2018-09-14 RX ADMIN — DEXAMETHASONE 1 DROP: 1 SUSPENSION OPHTHALMIC at 11:09

## 2018-09-14 RX ADMIN — DEXAMETHASONE 1 DROP: 1 SUSPENSION OPHTHALMIC at 05:09

## 2018-09-14 RX ADMIN — MAGNESIUM OXIDE TAB 400 MG (241.3 MG ELEMENTAL MG) 400 MG: 400 (241.3 MG) TAB at 02:09

## 2018-09-14 RX ADMIN — SEVELAMER CARBONATE 800 MG: 800 TABLET, FILM COATED ORAL at 11:09

## 2018-09-14 RX ADMIN — ONDANSETRON 4 MG: 4 TABLET, FILM COATED ORAL at 12:09

## 2018-09-14 RX ADMIN — CYTARABINE 5040 MG: 100 INJECTION, SOLUTION INTRATHECAL; INTRAVENOUS; SUBCUTANEOUS at 05:09

## 2018-09-14 RX ADMIN — DEXAMETHASONE 8 MG: 4 TABLET ORAL at 04:09

## 2018-09-14 RX ADMIN — SEVELAMER CARBONATE 800 MG: 800 TABLET, FILM COATED ORAL at 05:09

## 2018-09-14 RX ADMIN — SEVELAMER CARBONATE 800 MG: 800 TABLET, FILM COATED ORAL at 07:09

## 2018-09-14 RX ADMIN — ESCITALOPRAM OXALATE 10 MG: 10 TABLET ORAL at 08:09

## 2018-09-14 RX ADMIN — PROCHLORPERAZINE MALEATE 5 MG: 5 TABLET, FILM COATED ORAL at 09:09

## 2018-09-14 RX ADMIN — MAGNESIUM OXIDE TAB 400 MG (241.3 MG ELEMENTAL MG) 400 MG: 400 (241.3 MG) TAB at 09:09

## 2018-09-14 RX ADMIN — SODIUM CHLORIDE: 0.9 INJECTION, SOLUTION INTRAVENOUS at 11:09

## 2018-09-14 RX ADMIN — ALPRAZOLAM 0.25 MG: 0.25 TABLET ORAL at 11:09

## 2018-09-14 RX ADMIN — PANTOPRAZOLE SODIUM 40 MG: 40 TABLET, DELAYED RELEASE ORAL at 08:09

## 2018-09-14 RX ADMIN — MAGNESIUM OXIDE TAB 400 MG (241.3 MG ELEMENTAL MG) 400 MG: 400 (241.3 MG) TAB at 11:09

## 2018-09-14 NOTE — PROGRESS NOTES
Chemo note: Cytarabine 5,040 mg started through right forearm 20g noted for having positive blood return over 3 hours.  Chemotherapy dosage and BSA checked by two chemotherapy certified nurses prior to administration.  Chemotherapy education done prior to hanging of chemotherapy.  Chemotherapeutic precautions in place throughout therapy.  Will continue to monitor.

## 2018-09-14 NOTE — SUBJECTIVE & OBJECTIVE
Patient information was obtained from patient, past medical records and ER records.     Interval Hx: NAEON. Continuing HiDac cycle 2. Holding AM dose of metoprolol for HR 60    Oncology History:    68 y.o. female admitted with AML. Originally came in from [facility] with elevated WBC of 100 on 05/25/2018.  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  -5/28/18: started 7+3 induction  -6/11/18: restaging BM bx done, shows persistent disease with 15% blasts. Discussion with patient regarding treatment and she is deciding if she wants to proceed with re-induction vs outpatient maintenance.   -06/15/2018: started Flag-JENNIFER.   --hospitalized 7/23-7/25 for C.diff diarrhea, neutropenic fever.  While inpatient she developed pink blisters on her palm that were concerning for relapse.  --palm biopsy on 7/26/18 for suspicion of leukemia cutis was also negative for sign of relapse  --bone marrow biopsy 7/31/18 was negative for signs of relapse   --Admitted on 8/14/18 for HiDAC#1  --Admitted on 9/11/18 for HiDAC#2    Medications Prior to Admission   Medication Sig Dispense Refill Last Dose    acyclovir (ZOVIRAX) 200 MG capsule Take 2 capsules (400 mg total) by mouth 2 (two) times daily. 120 capsule 11 9/11/2018 at Unknown time    ciprofloxacin HCl (CIPRO) 500 MG tablet Take 1 tablet (500 mg total) by mouth every 12 (twelve) hours. 60 tablet 3 9/11/2018 at Unknown time    escitalopram oxalate (LEXAPRO) 10 MG tablet Take 10 mg by mouth once daily.   9/11/2018 at Unknown time    fluconazole (DIFLUCAN) 200 MG Tab Take 2 tablets (400 mg total) by mouth once daily. 60 tablet 3 Past Week at Unknown time    magnesium oxide (MAG-OX) 400 mg tablet Take 2 tablets (800 mg total) by mouth 2 (two) times daily. 120 tablet 1 9/11/2018 at Unknown time    metoprolol succinate (TOPROL-XL) 25 MG 24 hr tablet Take 0.5 tablets (12.5 mg total) by mouth once daily. 30 tablet 3 9/11/2018 at Unknown time    omeprazole (PRILOSEC OTC) 20 MG tablet Take 20 mg  by mouth every morning.   9/11/2018 at Unknown time    ondansetron (ZOFRAN) 8 MG tablet Take 1 tablet (8 mg total) by mouth every 12 (twelve) hours as needed for Nausea. 30 tablet 2 Past Month at Unknown time    prochlorperazine (COMPAZINE) 10 MG tablet Take 1 tablet (10 mg total) by mouth 4 (four) times daily. 60 tablet 3 Past Month at Unknown time    vitamin D 1000 units Tab Take 1 tablet (1,000 Units total) by mouth once daily. 30 tablet 2 9/11/2018 at Unknown time    ALPRAZolam (XANAX) 0.25 MG tablet Take 1 tablet (0.25 mg total) by mouth 3 (three) times daily as needed for Anxiety. 40 tablet 1 Taking    hydrocortisone 2.5 % cream Apply topically 2 (two) times daily. Apply to affected area on palms for 10 days 28 g 3 Taking    LORazepam (ATIVAN) 0.5 MG tablet Take 1 tablet (0.5 mg total) by mouth every 6 (six) hours as needed (nausea). 60 tablet 0 Unknown at Unknown time    midostaurin (RYDAPT) 25 mg Cap Take 50 mg by mouth 2 (two) times daily on days 8 to 21 of cycle 56 capsule 2 Unknown at Unknown time    morphine (MS CONTIN) 15 MG 12 hr tablet Take 2 tablets (30 mg total) by mouth 2 (two) times daily. 60 tablet 0 Unknown at Unknown time    morphine (MSIR) 15 MG tablet Take 1 tablet (15 mg total) by mouth every 4 (four) hours as needed. 40 tablet 0 Unknown at Unknown time       Methotrexate analogues; Sulfa (sulfonamide antibiotics); and Bactrim [sulfamethoxazole-trimethoprim]     Past Medical History:   Diagnosis Date    Cancer 03/2018    AML    CHF (congestive heart failure)     Diarrhea 9/14/2018    Encounter for blood transfusion      Past Surgical History:   Procedure Laterality Date    CHOLECYSTECTOMY      HYSTERECTOMY      TONSILLECTOMY       Family History     Problem Relation (Age of Onset)    Cancer Mother, Father        Tobacco Use    Smoking status: Former Smoker     Packs/day: 0.50     Years: 10.00     Pack years: 5.00     Types: Cigarettes     Last attempt to quit: 3/25/2017      Years since quittin.4    Smokeless tobacco: Never Used   Substance and Sexual Activity    Alcohol use: Yes     Alcohol/week: 0.6 oz     Types: 1 Shots of liquor per week    Drug use: No    Sexual activity: Yes     Partners: Female       Review of Systems   Constitutional: Negative for appetite change, chills, fatigue and fever.   HENT: Negative for sinus pressure, sinus pain and sore throat.    Eyes: Negative for photophobia.   Respiratory: Negative for cough and shortness of breath.    Cardiovascular: Negative for chest pain and leg swelling.   Gastrointestinal: Negative for abdominal pain, constipation, diarrhea, nausea and vomiting.   Genitourinary: Negative for dysuria.   Musculoskeletal: Negative for arthralgias, back pain and myalgias.   Skin: Negative for rash.   Neurological: Negative for dizziness, syncope, light-headedness, numbness and headaches.   Hematological: Does not bruise/bleed easily.   Psychiatric/Behavioral: Negative for agitation and confusion.     Objective:     Vital Signs (Most Recent):  Temp: 98.3 °F (36.8 °C) (18 075)  Pulse: 63 (18 075)  Resp: 18 (18)  BP: 130/61 (18)  SpO2: 97 % (18) Vital Signs (24h Range):  Temp:  [97.9 °F (36.6 °C)-98.3 °F (36.8 °C)] 98.3 °F (36.8 °C)  Pulse:  [58-72] 63  Resp:  [16-18] 18  SpO2:  [96 %-100 %] 97 %  BP: (130-150)/(60-67) 130/61     Weight: 63.3 kg (139 lb 8.8 oz)  Body mass index is 24.72 kg/m².  Body surface area is 1.68 meters squared.    ECOG SCORE         [unfilled]    Lines/Drains/Airways     Peripheral Intravenous Line                 Peripheral IV - Single Lumen 18 0520 Left Antecubital less than 1 day                Physical Exam   Constitutional: She is oriented to person, place, and time. She appears well-nourished. No distress.   HENT:   Head: Normocephalic and atraumatic.   alopecia   Eyes: Conjunctivae and EOM are normal. No scleral icterus.   Neck: Normal range of motion. Neck  supple.   Cardiovascular: Normal rate and regular rhythm.   No murmur heard.  Pulmonary/Chest: Effort normal. No respiratory distress. She has no wheezes. She has no rales.   Abdominal: Soft. She exhibits no distension. There is no tenderness. There is no guarding.   Musculoskeletal: Normal range of motion. She exhibits no edema or tenderness.   Lymphadenopathy:     She has no cervical adenopathy.   Neurological: She is alert and oriented to person, place, and time.   Skin: Skin is warm. No rash noted. She is not diaphoretic. No erythema.   Psychiatric: She has a normal mood and affect.       Significant Labs:   CBC:   Recent Labs   Lab  09/13/18   0540  09/14/18   0401   WBC  5.23  1.87*   HGB  7.5*  7.5*   HCT  23.0*  22.8*   PLT  126*  123*    and CMP:   Recent Labs   Lab  09/13/18   0540  09/14/18   0401   NA  141  144   K  4.6  4.4   CL  109  109   CO2  28  26   GLU  124*  90   BUN  14  11   CREATININE  0.8  0.7   CALCIUM  9.8  10.0   PROT  5.8*  5.7*   ALBUMIN  3.1*  3.1*   BILITOT  0.5  0.7   ALKPHOS  97  88   AST  13  14   ALT  7*  7*   ANIONGAP  4*  9   EGFRNONAA  >60.0  >60.0       Diagnostic Results:  I have reviewed all pertinent imaging results/findings within the past 24 hours.

## 2018-09-14 NOTE — ASSESSMENT & PLAN NOTE
- Admitted from [facility] on 5/25/18 early AM with WBC around 100s, for suspected new non-M3 AML  - Lo res HLA typing on 5/26/18 and hi res on 5/27/18 done in anticipation of possible need for future transplant   - Pt with two daughters, two full sisters (in their mid 60s, one with brain aneurysm hx, other one without any medical issues), and one full brother (59 y/o healthy).  - NPM1 +, CEBPA (-), FLT3 (+).  On Midostaurin 50 mg PO BID until Day 14 (held starting 6/8 to 6/11). Stopped when FLAG JENNIFER re-induction started. Restarted Midostaurin at 25 mg bid on day 8 of FLAG JENNIFER induction (6/22), increased to full dose 50 mg bid (6/26) as improvement in liver enzymes. Stopped 7/3/18 due to abnormal cardiac echo.     - Received 7+3, day 14 bone marrow biopsy completed 6/11 showing persistent disease with 15% CD 34 positive blasts; was reinduced with FLAG-JENNIFER, day 14 restaging bone marrow biopsy done 6/29 without complication, results with no evidence of AML, FLT 3 negative  - Repeat marrow at count recovery 7/31 showed no evidence of AML  - Derm bx of right palm negative for leukemia cutis and sweets  - Admitted on 8/14/18 for planned cycle 1 hidac  - Admitted on 9/11/18 for planned cycle 2 hidac.  Will be discharged after 6 doses of HiDAC.  --increased corrected calcium of 10.7, increased fluid rate to 100 cc/hour on 9/14

## 2018-09-14 NOTE — PLAN OF CARE
MDR's with Dr Troy.  Patient is day 3 of HiDAC.  IVF increased due to low PO intake.  Chemo is scheduled to complete chemo Sunday evening.  Patient would like to d/c Jermain evening if well enough.  If not, plan to d/c on Monday.  Patient has orders for labs to be drawn at her local clinic.  F/u and next admit appointment made.  No HH/DME needs.  The patient is in agreement with the plan of care  .    Future Appointments   Date Time Provider Department Center   9/26/2018  1:00 PM LAB, HEMON CANCER [facility] LAB B[facility]DEN Rodriguezson Bro   9/26/2018  2:00 PM Laquita Troy MD [facility] HEM ONC [facility]   10/9/2018  3:00 PM LAB, [facility] CANCER [facility] LAB B[facility] Cee Bro   10/9/2018  4:00 PM Laquita Troy MD [facility] HEM ONC [facility]     Follow-up Information     Please follow up.    Why:  Labs- 2x / week as advised   Contact information:  [facility]  Address: [address] [place] [address], [place] [address]  Phone: [phone]                     09/14/18 1254   Final Note   Assessment Type Final Discharge Note   Discharge Disposition Home   What phone number can be called within the next 1-3 days to see how you are doing after discharge? (Address: [address] [place] [address], [place] [address])   Hospital Follow Up  Appt(s) scheduled? Yes   Discharge plans and expectations educations in teach back method with documentation complete? Yes

## 2018-09-14 NOTE — PROGRESS NOTES
[facility]  Hematology  Bone Marrow Transplant  Progress Note    Patient Name: Sabrina Mac  Admission Date: 9/11/2018  Hospital Length of Stay: 3 days  Code Status: Full Code  Patient information was obtained from patient, past medical records and ER records.     Interval Hx: NAEON. Continuing HiDac cycle 2. Holding AM dose of metoprolol for HR 60    Oncology History:    68 y.o. female admitted with AML. Originally came in from [facility] with elevated WBC of 100 on 05/25/2018.  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  -5/28/18: started 7+3 induction  -6/11/18: restaging BM bx done, shows persistent disease with 15% blasts. Discussion with patient regarding treatment and she is deciding if she wants to proceed with re-induction vs outpatient maintenance.   -06/15/2018: started Flag-JENNIFER.   --hospitalized 7/23-7/25 for C.diff diarrhea, neutropenic fever.  While inpatient she developed pink blisters on her palm that were concerning for relapse.  --palm biopsy on 7/26/18 for suspicion of leukemia cutis was also negative for sign of relapse  --bone marrow biopsy 7/31/18 was negative for signs of relapse   --Admitted on 8/14/18 for HiDAC#1  --Admitted on 9/11/18 for HiDAC#2    Medications Prior to Admission   Medication Sig Dispense Refill Last Dose    acyclovir (ZOVIRAX) 200 MG capsule Take 2 capsules (400 mg total) by mouth 2 (two) times daily. 120 capsule 11 9/11/2018 at Unknown time    ciprofloxacin HCl (CIPRO) 500 MG tablet Take 1 tablet (500 mg total) by mouth every 12 (twelve) hours. 60 tablet 3 9/11/2018 at Unknown time    escitalopram oxalate (LEXAPRO) 10 MG tablet Take 10 mg by mouth once daily.   9/11/2018 at Unknown time    fluconazole (DIFLUCAN) 200 MG Tab Take 2 tablets (400 mg total) by mouth once daily. 60 tablet 3 Past Week at Unknown time    magnesium oxide (MAG-OX) 400 mg tablet Take 2 tablets (800 mg total) by mouth 2 (two) times daily. 120 tablet 1 9/11/2018 at Unknown time     metoprolol succinate (TOPROL-XL) 25 MG 24 hr tablet Take 0.5 tablets (12.5 mg total) by mouth once daily. 30 tablet 3 9/11/2018 at Unknown time    omeprazole (PRILOSEC OTC) 20 MG tablet Take 20 mg by mouth every morning.   9/11/2018 at Unknown time    ondansetron (ZOFRAN) 8 MG tablet Take 1 tablet (8 mg total) by mouth every 12 (twelve) hours as needed for Nausea. 30 tablet 2 Past Month at Unknown time    prochlorperazine (COMPAZINE) 10 MG tablet Take 1 tablet (10 mg total) by mouth 4 (four) times daily. 60 tablet 3 Past Month at Unknown time    vitamin D 1000 units Tab Take 1 tablet (1,000 Units total) by mouth once daily. 30 tablet 2 9/11/2018 at Unknown time    ALPRAZolam (XANAX) 0.25 MG tablet Take 1 tablet (0.25 mg total) by mouth 3 (three) times daily as needed for Anxiety. 40 tablet 1 Taking    hydrocortisone 2.5 % cream Apply topically 2 (two) times daily. Apply to affected area on palms for 10 days 28 g 3 Taking    LORazepam (ATIVAN) 0.5 MG tablet Take 1 tablet (0.5 mg total) by mouth every 6 (six) hours as needed (nausea). 60 tablet 0 Unknown at Unknown time    midostaurin (RYDAPT) 25 mg Cap Take 50 mg by mouth 2 (two) times daily on days 8 to 21 of cycle 56 capsule 2 Unknown at Unknown time    morphine (MS CONTIN) 15 MG 12 hr tablet Take 2 tablets (30 mg total) by mouth 2 (two) times daily. 60 tablet 0 Unknown at Unknown time    morphine (MSIR) 15 MG tablet Take 1 tablet (15 mg total) by mouth every 4 (four) hours as needed. 40 tablet 0 Unknown at Unknown time       Methotrexate analogues; Sulfa (sulfonamide antibiotics); and Bactrim [sulfamethoxazole-trimethoprim]     Past Medical History:   Diagnosis Date    Cancer 03/2018    AML    CHF (congestive heart failure)     Diarrhea 9/14/2018    Encounter for blood transfusion      Past Surgical History:   Procedure Laterality Date    CHOLECYSTECTOMY      HYSTERECTOMY      TONSILLECTOMY       Family History     Problem Relation (Age of  Onset)    Cancer Mother, Father        Tobacco Use    Smoking status: Former Smoker     Packs/day: 0.50     Years: 10.00     Pack years: 5.00     Types: Cigarettes     Last attempt to quit: 3/25/2017     Years since quittin.4    Smokeless tobacco: Never Used   Substance and Sexual Activity    Alcohol use: Yes     Alcohol/week: 0.6 oz     Types: 1 Shots of liquor per week    Drug use: No    Sexual activity: Yes     Partners: Female       Review of Systems   Constitutional: Negative for appetite change, chills, fatigue and fever.   HENT: Negative for sinus pressure, sinus pain and sore throat.    Eyes: Negative for photophobia.   Respiratory: Negative for cough and shortness of breath.    Cardiovascular: Negative for chest pain and leg swelling.   Gastrointestinal: Negative for abdominal pain, constipation, diarrhea, nausea and vomiting.   Genitourinary: Negative for dysuria.   Musculoskeletal: Negative for arthralgias, back pain and myalgias.   Skin: Negative for rash.   Neurological: Negative for dizziness, syncope, light-headedness, numbness and headaches.   Hematological: Does not bruise/bleed easily.   Psychiatric/Behavioral: Negative for agitation and confusion.     Objective:     Vital Signs (Most Recent):  Temp: 98.3 °F (36.8 °C) (18 075)  Pulse: 63 (18 075)  Resp: 18 (18)  BP: 130/61 (18)  SpO2: 97 % (18) Vital Signs (24h Range):  Temp:  [97.9 °F (36.6 °C)-98.3 °F (36.8 °C)] 98.3 °F (36.8 °C)  Pulse:  [58-72] 63  Resp:  [16-18] 18  SpO2:  [96 %-100 %] 97 %  BP: (130-150)/(60-67) 130/61     Weight: 63.3 kg (139 lb 8.8 oz)  Body mass index is 24.72 kg/m².  Body surface area is 1.68 meters squared.    ECOG SCORE         [unfilled]    Lines/Drains/Airways     Peripheral Intravenous Line                 Peripheral IV - Single Lumen 18 0520 Left Antecubital less than 1 day                Physical Exam   Constitutional: She is oriented to person, place, and  time. She appears well-nourished. No distress.   HENT:   Head: Normocephalic and atraumatic.   alopecia   Eyes: Conjunctivae and EOM are normal. No scleral icterus.   Neck: Normal range of motion. Neck supple.   Cardiovascular: Normal rate and regular rhythm.   No murmur heard.  Pulmonary/Chest: Effort normal. No respiratory distress. She has no wheezes. She has no rales.   Abdominal: Soft. She exhibits no distension. There is no tenderness. There is no guarding.   Musculoskeletal: Normal range of motion. She exhibits no edema or tenderness.   Lymphadenopathy:     She has no cervical adenopathy.   Neurological: She is alert and oriented to person, place, and time.   Skin: Skin is warm. No rash noted. She is not diaphoretic. No erythema.   Psychiatric: She has a normal mood and affect.       Significant Labs:   CBC:   Recent Labs   Lab  09/13/18   0540  09/14/18   0401   WBC  5.23  1.87*   HGB  7.5*  7.5*   HCT  23.0*  22.8*   PLT  126*  123*    and CMP:   Recent Labs   Lab  09/13/18   0540  09/14/18   0401   NA  141  144   K  4.6  4.4   CL  109  109   CO2  28  26   GLU  124*  90   BUN  14  11   CREATININE  0.8  0.7   CALCIUM  9.8  10.0   PROT  5.8*  5.7*   ALBUMIN  3.1*  3.1*   BILITOT  0.5  0.7   ALKPHOS  97  88   AST  13  14   ALT  7*  7*   ANIONGAP  4*  9   EGFRNONAA  >60.0  >60.0       Diagnostic Results:  I have reviewed all pertinent imaging results/findings within the past 24 hours.    Assessment/Plan:     * AML (acute myeloblastic leukemia)    - Admitted from [facility] on 5/25/18 early AM with WBC around 100s, for suspected new non-M3 AML  - Lo res HLA typing on 5/26/18 and hi res on 5/27/18 done in anticipation of possible need for future transplant   - Pt with two daughters, two full sisters (in their mid 60s, one with brain aneurysm hx, other one without any medical issues), and one full brother (61 y/o healthy).  - NPM1 +, CEBPA (-), FLT3 (+).  On Midostaurin 50 mg PO BID until Day 14 (held  "starting 6/8 to 6/11). Stopped when FLAG JENNIFER re-induction started. Restarted Midostaurin at 25 mg bid on day 8 of FLAG JENNIFER induction (6/22), increased to full dose 50 mg bid (6/26) as improvement in liver enzymes. Stopped 7/3/18 due to abnormal cardiac echo.     - Received 7+3, day 14 bone marrow biopsy completed 6/11 showing persistent disease with 15% CD 34 positive blasts; was reinduced with FLAG-JENNIFER, day 14 restaging bone marrow biopsy done 6/29 without complication, results with no evidence of AML, FLT 3 negative  - Repeat marrow at count recovery 7/31 showed no evidence of AML  - Derm bx of right palm negative for leukemia cutis and sweets  - Admitted on 8/14/18 for planned cycle 1 hidac  - Admitted on 9/11/18 for planned cycle 2 hidac.  Will be discharged after 6 doses of HiDAC.  --increased corrected calcium of 10.7, increased fluid rate to 100 cc/hour on 9/14        Chronic pain    - home pain regimen: MSIR and MS contin listed  - Oxycodone ordered PRN        Chemotherapy induced cardiomyopathy    - Per Dr. Agarwal's last note 8/28/18: "Her LVEF has normalized. We discussed taking metoprolol 12.5 mg nightly to see if she can tolerate it. I would recommend checking an ECG and troponin level monthly while taking midostaurin to monitor the QT interval as well as for any early sign of cardiac toxicity. Recommend repeating her echo in 3 months and with any abnormal troponin level."  - Hold toprol if hypotensive        Pancytopenia due to chemotherapy    - transfuse for hgb <7 and plt <10K        Depression    - continue home lexapro, xanax prn            VTE Risk Mitigation (From admission, onward)        Ordered     heparin, porcine (PF) 100 unit/mL injection flush 300 Units  As needed (PRN)      09/11/18 3774     Place MAYLIN hose  Until discontinued      09/11/18 9507            Genoveva Suero MD  Bone Marrow Transplant  [facility]      "

## 2018-09-14 NOTE — PLAN OF CARE
Problem: Patient Care Overview  Goal: Plan of Care Review  Outcome: Ongoing (interventions implemented as appropriate)  Pt c/o nausea. PRN Compazine given. Uses bedside commode. NS at 50. Cytarabine started 0500. Vital signs stable. Afebrile. Frequent rounds made to assess pain and safety. Patient remained free from falls. Bed in lowest position. Side rails up X 2. Call light within reach. Will continue to monitor.

## 2018-09-14 NOTE — PROGRESS NOTES
Cytarabine started through right forearm 20g noted for having positive blood return over 3 hours.  Chemotherapy dosage and BSA checked by two chemotherapy certified nurses prior to administration.  Chemotherapy education done prior to hanging of chemotherapy.  Chemotherapeutic precautions in place throughout therapy.  Will continue to monitor.

## 2018-09-14 NOTE — ASSESSMENT & PLAN NOTE
--CDIF negative diarrhea  --patient was not asking for PRN immodium and was continuing to have multiple BMs throughout the day and night  --scheduled immodium to BID on 9/14

## 2018-09-15 LAB
ALBUMIN SERPL BCP-MCNC: 3 G/DL
ALP SERPL-CCNC: 80 U/L
ALT SERPL W/O P-5'-P-CCNC: 6 U/L
ANION GAP SERPL CALC-SCNC: 8 MMOL/L
AST SERPL-CCNC: 12 U/L
BASOPHILS # BLD AUTO: 0 K/UL
BASOPHILS NFR BLD: 0 %
BILIRUB SERPL-MCNC: 0.8 MG/DL
BUN SERPL-MCNC: 14 MG/DL
CALCIUM SERPL-MCNC: 9.8 MG/DL
CHLORIDE SERPL-SCNC: 109 MMOL/L
CO2 SERPL-SCNC: 23 MMOL/L
CREAT SERPL-MCNC: 0.7 MG/DL
DIFFERENTIAL METHOD: ABNORMAL
EOSINOPHIL # BLD AUTO: 0 K/UL
EOSINOPHIL NFR BLD: 0 %
ERYTHROCYTE [DISTWIDTH] IN BLOOD BY AUTOMATED COUNT: 15.9 %
EST. GFR  (AFRICAN AMERICAN): >60 ML/MIN/1.73 M^2
EST. GFR  (NON AFRICAN AMERICAN): >60 ML/MIN/1.73 M^2
GLUCOSE SERPL-MCNC: 111 MG/DL
HCT VFR BLD AUTO: 21.8 %
HGB BLD-MCNC: 7.3 G/DL
IMM GRANULOCYTES # BLD AUTO: 0.01 K/UL
IMM GRANULOCYTES NFR BLD AUTO: 0.4 %
LYMPHOCYTES # BLD AUTO: 0 K/UL
LYMPHOCYTES NFR BLD: 1.8 %
MAGNESIUM SERPL-MCNC: 1.6 MG/DL
MCH RBC QN AUTO: 30.2 PG
MCHC RBC AUTO-ENTMCNC: 33.5 G/DL
MCV RBC AUTO: 90 FL
MONOCYTES # BLD AUTO: 0 K/UL
MONOCYTES NFR BLD: 0.9 %
NEUTROPHILS # BLD AUTO: 2.2 K/UL
NEUTROPHILS NFR BLD: 96.9 %
NRBC BLD-RTO: 0 /100 WBC
PHOSPHATE SERPL-MCNC: 5.1 MG/DL
PLATELET # BLD AUTO: 109 K/UL
PMV BLD AUTO: 10.2 FL
POTASSIUM SERPL-SCNC: 4.3 MMOL/L
PROT SERPL-MCNC: 5.5 G/DL
RBC # BLD AUTO: 2.42 M/UL
SODIUM SERPL-SCNC: 140 MMOL/L
URATE SERPL-MCNC: 5 MG/DL
WBC # BLD AUTO: 2.24 K/UL

## 2018-09-15 PROCEDURE — 84550 ASSAY OF BLOOD/URIC ACID: CPT

## 2018-09-15 PROCEDURE — 83735 ASSAY OF MAGNESIUM: CPT

## 2018-09-15 PROCEDURE — 20600001 HC STEP DOWN PRIVATE ROOM

## 2018-09-15 PROCEDURE — 63600175 PHARM REV CODE 636 W HCPCS: Performed by: STUDENT IN AN ORGANIZED HEALTH CARE EDUCATION/TRAINING PROGRAM

## 2018-09-15 PROCEDURE — 25000003 PHARM REV CODE 250: Performed by: STUDENT IN AN ORGANIZED HEALTH CARE EDUCATION/TRAINING PROGRAM

## 2018-09-15 PROCEDURE — 85025 COMPLETE CBC W/AUTO DIFF WBC: CPT

## 2018-09-15 PROCEDURE — 25000003 PHARM REV CODE 250: Performed by: NURSE PRACTITIONER

## 2018-09-15 PROCEDURE — 36415 COLL VENOUS BLD VENIPUNCTURE: CPT

## 2018-09-15 PROCEDURE — 25000003 PHARM REV CODE 250: Performed by: HOSPITALIST

## 2018-09-15 PROCEDURE — 84100 ASSAY OF PHOSPHORUS: CPT

## 2018-09-15 PROCEDURE — 99233 SBSQ HOSP IP/OBS HIGH 50: CPT | Mod: ,,, | Performed by: INTERNAL MEDICINE

## 2018-09-15 PROCEDURE — 80053 COMPREHEN METABOLIC PANEL: CPT

## 2018-09-15 RX ORDER — LORAZEPAM 0.5 MG/1
0.5 TABLET ORAL EVERY 6 HOURS PRN
Status: DISCONTINUED | OUTPATIENT
Start: 2018-09-15 | End: 2018-09-16 | Stop reason: HOSPADM

## 2018-09-15 RX ADMIN — DEXAMETHASONE 1 DROP: 1 SUSPENSION OPHTHALMIC at 11:09

## 2018-09-15 RX ADMIN — ACYCLOVIR 400 MG: 200 CAPSULE ORAL at 09:09

## 2018-09-15 RX ADMIN — DEXAMETHASONE 1 DROP: 1 SUSPENSION OPHTHALMIC at 06:09

## 2018-09-15 RX ADMIN — SEVELAMER CARBONATE 800 MG: 800 TABLET, FILM COATED ORAL at 07:09

## 2018-09-15 RX ADMIN — ALPRAZOLAM 0.25 MG: 0.25 TABLET ORAL at 07:09

## 2018-09-15 RX ADMIN — LORAZEPAM 0.5 MG: 0.5 TABLET ORAL at 05:09

## 2018-09-15 RX ADMIN — ACYCLOVIR 400 MG: 200 CAPSULE ORAL at 08:09

## 2018-09-15 RX ADMIN — SODIUM CHLORIDE: 0.9 INJECTION, SOLUTION INTRAVENOUS at 09:09

## 2018-09-15 RX ADMIN — SEVELAMER CARBONATE 800 MG: 800 TABLET, FILM COATED ORAL at 11:09

## 2018-09-15 RX ADMIN — SEVELAMER CARBONATE 800 MG: 800 TABLET, FILM COATED ORAL at 05:09

## 2018-09-15 RX ADMIN — METOPROLOL SUCCINATE 12.5 MG: 25 TABLET, EXTENDED RELEASE ORAL at 03:09

## 2018-09-15 RX ADMIN — DEXAMETHASONE 1 DROP: 1 SUSPENSION OPHTHALMIC at 05:09

## 2018-09-15 RX ADMIN — PANTOPRAZOLE SODIUM 40 MG: 40 TABLET, DELAYED RELEASE ORAL at 09:09

## 2018-09-15 RX ADMIN — ESCITALOPRAM OXALATE 10 MG: 10 TABLET ORAL at 09:09

## 2018-09-15 RX ADMIN — PROCHLORPERAZINE MALEATE 5 MG: 5 TABLET, FILM COATED ORAL at 11:09

## 2018-09-15 NOTE — ASSESSMENT & PLAN NOTE
- Received 7+3, day 14 bone marrow biopsy completed 6/11 showing persistent disease with 15% CD 34 positive blasts; was reinduced with FLAG-JENNIFER, day 14 restaging bone marrow biopsy done 6/29 without complication, results with no evidence of AML, FLT 3 negative  - Repeat marrow at count recovery 7/31 showed no evidence of AML  - Derm bx of right palm negative for leukemia cutis and sweets  - Cycle 1 HiDAC (8/14/18)  - Day 4 of Cycle 2 HiDAC with tentative plan for 6 doses.   - Not neutropenic yet, only on Acyclovir

## 2018-09-15 NOTE — ASSESSMENT & PLAN NOTE
- continue home lexapro, xanax prn  - Stable problem, no acute change, continue current medication.

## 2018-09-15 NOTE — PLAN OF CARE
Problem: Patient Care Overview  Goal: Plan of Care Review  Outcome: Ongoing (interventions implemented as appropriate)  Afebrile. Free from falls or injury. No complaints of pain. NS infusing at 100ml/hr. Dex eyedrops given as scheduled. Bed locked in lowest position, non skid socks on, call light within reach. Pt instructed to call if any assistance is needed. Vitals stable. Will cont to rik pt.

## 2018-09-15 NOTE — ASSESSMENT & PLAN NOTE
"- Patient of Dr. Agarwal's last visit on 8/28/18, improved from 30% to 60%.   - Resume on Metoprolol 12.5 mg PO BID with good tolerance.   - Recommended ECG and troponin level monthly while taking midostaurin to monitor the QT interval as well as for any early sign of cardiac toxicity.   - Recommend repeating her echo in 3 months and with any abnormal troponin level."  "

## 2018-09-15 NOTE — PROGRESS NOTES
[facility]  Hematology  Bone Marrow Transplant  Progress Note    Patient Name: Sabrina Mac  Admission Date: 9/11/2018  Hospital Length of Stay: 4 days  Code Status: Full Code  Interval Hx: Over the night, the patient has no complain, no active event happened and change in the treatment plan occurred. Patient denies chest pain, SOB, difficulty in breathing, and had one bowel movement and actively ambulating with no restriction.     Objective:     Vital Signs (Most Recent):  Temp: 98.2 °F (36.8 °C) (09/15/18 0738)  Pulse: 64 (09/15/18 0738)  Resp: 18 (09/15/18 0738)  BP: (!) 147/69 (09/15/18 0738)  SpO2: 98 % (09/15/18 0738) Vital Signs (24h Range):  Temp:  [97.9 °F (36.6 °C)-98.4 °F (36.9 °C)] 98.2 °F (36.8 °C)  Pulse:  [55-72] 64  Resp:  [16-20] 18  SpO2:  [95 %-99 %] 98 %  BP: (115-147)/(56-69) 147/69     Weight: 61.5 kg (135 lb 9.3 oz)  Body mass index is 24.02 kg/m².  Body surface area is 1.65 meters squared.    ECOG SCORE         ECOG Performance Status Score:  2 Ambulatory and capable of all selfcare but unable to carry out any work activities; up and about more than 50% of waking hours.    Lines/Drains/Airways     Peripheral Intravenous Line                 Peripheral IV - Single Lumen 09/14/18 0520 Left Antecubital 1 day                Physical Exam   Constitutional: She is oriented to person, place, and time. She appears well-nourished. No distress.   HENT:   Head: Normocephalic and atraumatic.   alopecia   Eyes: Conjunctivae and EOM are normal. No scleral icterus.   Neck: Normal range of motion. Neck supple.   Cardiovascular: Normal rate and regular rhythm.   No murmur heard.  Pulmonary/Chest: Effort normal. No respiratory distress. She has no wheezes. She has no rales.   Abdominal: Soft. She exhibits no distension. There is no tenderness. There is no guarding.   Musculoskeletal: Normal range of motion. She exhibits no edema or tenderness.   Lymphadenopathy:     She has no cervical  "adenopathy.   Neurological: She is alert and oriented to person, place, and time.   Skin: Skin is warm. No rash noted. She is not diaphoretic. No erythema.   Psychiatric: She has a normal mood and affect.       Significant Labs:   CBC:   Recent Labs   Lab  09/14/18   0401  09/15/18   0455   WBC  1.87*  2.24*   HGB  7.5*  7.3*   HCT  22.8*  21.8*   PLT  123*  109*    and CMP:   Recent Labs   Lab  09/14/18   0401  09/15/18   0455   NA  144  140   K  4.4  4.3   CL  109  109   CO2  26  23   GLU  90  111*   BUN  11  14   CREATININE  0.7  0.7   CALCIUM  10.0  9.8   PROT  5.7*  5.5*   ALBUMIN  3.1*  3.0*   BILITOT  0.7  0.8   ALKPHOS  88  80   AST  14  12   ALT  7*  6*   ANIONGAP  9  8   EGFRNONAA  >60.0  >60.0       Diagnostic Results:  I have reviewed all pertinent imaging results/findings within the past 24 hours.    Assessment/Plan:     * AML (acute myeloblastic leukemia)    - Received 7+3, day 14 bone marrow biopsy completed 6/11 showing persistent disease with 15% CD 34 positive blasts; was reinduced with FLAG-JENNIFER, day 14 restaging bone marrow biopsy done 6/29 without complication, results with no evidence of AML, FLT 3 negative  - Repeat marrow at count recovery 7/31 showed no evidence of AML  - Derm bx of right palm negative for leukemia cutis and sweets  - Cycle 1 HiDAC (8/14/18)  - Day 4 of Cycle 2 HiDAC with tentative plan for 6 doses.   - Not neutropenic yet, only on Acyclovir         Chronic pain    - home pain regimen: MSIR and MS contin listed  - Oxycodone ordered PRN        Chemotherapy induced cardiomyopathy    - Patient of Dr. Agarwal's last visit on 8/28/18, improved from 30% to 60%.   - Resume on Metoprolol 12.5 mg PO BID with good tolerance.   - Recommended ECG and troponin level monthly while taking midostaurin to monitor the QT interval as well as for any early sign of cardiac toxicity.   - Recommend repeating her echo in 3 months and with any abnormal troponin level."        Pancytopenia due to " chemotherapy    - transfuse for hgb <7 and plt <10K, stable no indications for transfusion         Depression    - continue home lexapro, xanax prn  - Stable problem, no acute change, continue current medication.             VTE Risk Mitigation (From admission, onward)        Ordered     heparin, porcine (PF) 100 unit/mL injection flush 300 Units  As needed (PRN)      09/11/18 1754     Place MAYLIN hose  Until discontinued      09/11/18 3258          Disposition: pending PT OT, previously recommended Home    Jag Cowart MD  Bone Marrow Transplant  [facility]

## 2018-09-15 NOTE — SUBJECTIVE & OBJECTIVE
Interval Hx: Over the night, the patient has no complain, no active event happened and change in the treatment plan occurred. Patient denies chest pain, SOB, difficulty in breathing, and had one bowel movement and actively ambulating with no restriction.     Objective:     Vital Signs (Most Recent):  Temp: 98.2 °F (36.8 °C) (09/15/18 0738)  Pulse: 64 (09/15/18 0738)  Resp: 18 (09/15/18 0738)  BP: (!) 147/69 (09/15/18 0738)  SpO2: 98 % (09/15/18 0738) Vital Signs (24h Range):  Temp:  [97.9 °F (36.6 °C)-98.4 °F (36.9 °C)] 98.2 °F (36.8 °C)  Pulse:  [55-72] 64  Resp:  [16-20] 18  SpO2:  [95 %-99 %] 98 %  BP: (115-147)/(56-69) 147/69     Weight: 61.5 kg (135 lb 9.3 oz)  Body mass index is 24.02 kg/m².  Body surface area is 1.65 meters squared.    ECOG SCORE         ECOG Performance Status Score:  2 Ambulatory and capable of all selfcare but unable to carry out any work activities; up and about more than 50% of waking hours.    Lines/Drains/Airways     Peripheral Intravenous Line                 Peripheral IV - Single Lumen 09/14/18 0520 Left Antecubital 1 day                Physical Exam   Constitutional: She is oriented to person, place, and time. She appears well-nourished. No distress.   HENT:   Head: Normocephalic and atraumatic.   alopecia   Eyes: Conjunctivae and EOM are normal. No scleral icterus.   Neck: Normal range of motion. Neck supple.   Cardiovascular: Normal rate and regular rhythm.   No murmur heard.  Pulmonary/Chest: Effort normal. No respiratory distress. She has no wheezes. She has no rales.   Abdominal: Soft. She exhibits no distension. There is no tenderness. There is no guarding.   Musculoskeletal: Normal range of motion. She exhibits no edema or tenderness.   Lymphadenopathy:     She has no cervical adenopathy.   Neurological: She is alert and oriented to person, place, and time.   Skin: Skin is warm. No rash noted. She is not diaphoretic. No erythema.   Psychiatric: She has a normal mood and  affect.       Significant Labs:   CBC:   Recent Labs   Lab  09/14/18   0401  09/15/18   0455   WBC  1.87*  2.24*   HGB  7.5*  7.3*   HCT  22.8*  21.8*   PLT  123*  109*    and CMP:   Recent Labs   Lab  09/14/18   0401  09/15/18   0455   NA  144  140   K  4.4  4.3   CL  109  109   CO2  26  23   GLU  90  111*   BUN  11  14   CREATININE  0.7  0.7   CALCIUM  10.0  9.8   PROT  5.7*  5.5*   ALBUMIN  3.1*  3.0*   BILITOT  0.7  0.8   ALKPHOS  88  80   AST  14  12   ALT  7*  6*   ANIONGAP  9  8   EGFRNONAA  >60.0  >60.0       Diagnostic Results:  I have reviewed all pertinent imaging results/findings within the past 24 hours.

## 2018-09-15 NOTE — PLAN OF CARE
Problem: Patient Care Overview  Goal: Plan of Care Review  Outcome: Ongoing (interventions implemented as appropriate)  Plan of care reviewed with patient and .  FAll precautions maintained, side rails up x2, call light in reach, bed in low position and locked.  Getting iv fluids infusing without difficulty.  Ambulating , voiding and toleratinga regular diet without difficulty.  No compaints voiced.

## 2018-09-16 VITALS
WEIGHT: 135.56 LBS | TEMPERATURE: 99 F | SYSTOLIC BLOOD PRESSURE: 134 MMHG | OXYGEN SATURATION: 97 % | HEART RATE: 63 BPM | HEIGHT: 63 IN | RESPIRATION RATE: 18 BRPM | DIASTOLIC BLOOD PRESSURE: 64 MMHG | BODY MASS INDEX: 24.02 KG/M2

## 2018-09-16 LAB
ABO + RH BLD: NORMAL
ALBUMIN SERPL BCP-MCNC: 3.1 G/DL
ALP SERPL-CCNC: 76 U/L
ALT SERPL W/O P-5'-P-CCNC: 7 U/L
ANION GAP SERPL CALC-SCNC: 11 MMOL/L
ANISOCYTOSIS BLD QL SMEAR: SLIGHT
AST SERPL-CCNC: 16 U/L
BASOPHILS # BLD AUTO: 0 K/UL
BASOPHILS NFR BLD: 0 %
BILIRUB SERPL-MCNC: 0.8 MG/DL
BLD GP AB SCN CELLS X3 SERPL QL: NORMAL
BLD PROD TYP BPU: NORMAL
BLOOD UNIT EXPIRATION DATE: NORMAL
BLOOD UNIT TYPE CODE: 6200
BLOOD UNIT TYPE: NORMAL
BUN SERPL-MCNC: 11 MG/DL
CALCIUM SERPL-MCNC: 9.5 MG/DL
CHLORIDE SERPL-SCNC: 111 MMOL/L
CO2 SERPL-SCNC: 23 MMOL/L
CODING SYSTEM: NORMAL
CREAT SERPL-MCNC: 0.7 MG/DL
DIFFERENTIAL METHOD: ABNORMAL
DISPENSE STATUS: NORMAL
EOSINOPHIL # BLD AUTO: 0 K/UL
EOSINOPHIL NFR BLD: 0 %
ERYTHROCYTE [DISTWIDTH] IN BLOOD BY AUTOMATED COUNT: 15.8 %
EST. GFR  (AFRICAN AMERICAN): >60 ML/MIN/1.73 M^2
EST. GFR  (NON AFRICAN AMERICAN): >60 ML/MIN/1.73 M^2
GLUCOSE SERPL-MCNC: 94 MG/DL
HCT VFR BLD AUTO: 21.8 %
HGB BLD-MCNC: 7.3 G/DL
IMM GRANULOCYTES # BLD AUTO: 0 K/UL
IMM GRANULOCYTES NFR BLD AUTO: 0 %
LYMPHOCYTES # BLD AUTO: 0 K/UL
LYMPHOCYTES NFR BLD: 2.3 %
MAGNESIUM SERPL-MCNC: 1.4 MG/DL
MCH RBC QN AUTO: 29.9 PG
MCHC RBC AUTO-ENTMCNC: 33.5 G/DL
MCV RBC AUTO: 89 FL
MONOCYTES # BLD AUTO: 0 K/UL
MONOCYTES NFR BLD: 0 %
NEUTROPHILS # BLD AUTO: 1.3 K/UL
NEUTROPHILS NFR BLD: 97.7 %
NRBC BLD-RTO: 0 /100 WBC
NUM UNITS TRANS PACKED RBC: NORMAL
PHOSPHATE SERPL-MCNC: 4.6 MG/DL
PLATELET # BLD AUTO: 96 K/UL
PLATELET BLD QL SMEAR: ABNORMAL
PMV BLD AUTO: 10.1 FL
POTASSIUM SERPL-SCNC: 3.6 MMOL/L
PROT SERPL-MCNC: 5.7 G/DL
RBC # BLD AUTO: 2.44 M/UL
SODIUM SERPL-SCNC: 145 MMOL/L
URATE SERPL-MCNC: 4.2 MG/DL
WBC # BLD AUTO: 1.29 K/UL

## 2018-09-16 PROCEDURE — 99238 HOSP IP/OBS DSCHRG MGMT 30/<: CPT | Mod: ,,, | Performed by: INTERNAL MEDICINE

## 2018-09-16 PROCEDURE — 86850 RBC ANTIBODY SCREEN: CPT

## 2018-09-16 PROCEDURE — 86644 CMV ANTIBODY: CPT

## 2018-09-16 PROCEDURE — P9040 RBC LEUKOREDUCED IRRADIATED: HCPCS

## 2018-09-16 PROCEDURE — 85025 COMPLETE CBC W/AUTO DIFF WBC: CPT

## 2018-09-16 PROCEDURE — 97116 GAIT TRAINING THERAPY: CPT

## 2018-09-16 PROCEDURE — 86920 COMPATIBILITY TEST SPIN: CPT

## 2018-09-16 PROCEDURE — 25000003 PHARM REV CODE 250: Performed by: STUDENT IN AN ORGANIZED HEALTH CARE EDUCATION/TRAINING PROGRAM

## 2018-09-16 PROCEDURE — G8979 MOBILITY GOAL STATUS: HCPCS | Mod: CH

## 2018-09-16 PROCEDURE — 25000003 PHARM REV CODE 250: Performed by: INTERNAL MEDICINE

## 2018-09-16 PROCEDURE — G8978 MOBILITY CURRENT STATUS: HCPCS | Mod: CH

## 2018-09-16 PROCEDURE — 80053 COMPREHEN METABOLIC PANEL: CPT

## 2018-09-16 PROCEDURE — 36415 COLL VENOUS BLD VENIPUNCTURE: CPT

## 2018-09-16 PROCEDURE — 36430 TRANSFUSION BLD/BLD COMPNT: CPT

## 2018-09-16 PROCEDURE — 25000003 PHARM REV CODE 250: Performed by: HOSPITALIST

## 2018-09-16 PROCEDURE — 97161 PT EVAL LOW COMPLEX 20 MIN: CPT

## 2018-09-16 PROCEDURE — 84550 ASSAY OF BLOOD/URIC ACID: CPT

## 2018-09-16 PROCEDURE — 83735 ASSAY OF MAGNESIUM: CPT

## 2018-09-16 PROCEDURE — 63600175 PHARM REV CODE 636 W HCPCS: Performed by: INTERNAL MEDICINE

## 2018-09-16 PROCEDURE — 84100 ASSAY OF PHOSPHORUS: CPT

## 2018-09-16 PROCEDURE — G8980 MOBILITY D/C STATUS: HCPCS | Mod: CH

## 2018-09-16 PROCEDURE — 25000003 PHARM REV CODE 250: Performed by: NURSE PRACTITIONER

## 2018-09-16 RX ORDER — ACETAMINOPHEN 325 MG/1
650 TABLET ORAL ONCE AS NEEDED
Status: COMPLETED | OUTPATIENT
Start: 2018-09-16 | End: 2018-09-16

## 2018-09-16 RX ORDER — CIPROFLOXACIN 500 MG/1
500 TABLET ORAL EVERY 12 HOURS
Status: DISCONTINUED | OUTPATIENT
Start: 2018-09-16 | End: 2018-09-16 | Stop reason: HOSPADM

## 2018-09-16 RX ORDER — LANOLIN ALCOHOL/MO/W.PET/CERES
400 CREAM (GRAM) TOPICAL ONCE
Status: COMPLETED | OUTPATIENT
Start: 2018-09-16 | End: 2018-09-16

## 2018-09-16 RX ORDER — DIPHENHYDRAMINE HCL 25 MG
25 CAPSULE ORAL ONCE AS NEEDED
Status: COMPLETED | OUTPATIENT
Start: 2018-09-16 | End: 2018-09-16

## 2018-09-16 RX ORDER — ALPRAZOLAM 0.25 MG/1
0.25 TABLET ORAL 3 TIMES DAILY PRN
Qty: 40 TABLET | Refills: 1 | Status: SHIPPED | OUTPATIENT
Start: 2018-09-16 | End: 2018-11-08 | Stop reason: SDUPTHER

## 2018-09-16 RX ORDER — HYDROCODONE BITARTRATE AND ACETAMINOPHEN 500; 5 MG/1; MG/1
TABLET ORAL
Status: DISCONTINUED | OUTPATIENT
Start: 2018-09-16 | End: 2018-09-16 | Stop reason: HOSPADM

## 2018-09-16 RX ORDER — DEXAMETHASONE SODIUM PHOSPHATE 1 MG/ML
1 SOLUTION/ DROPS OPHTHALMIC EVERY 6 HOURS
Status: ON HOLD
Start: 2018-09-16 | End: 2018-10-18 | Stop reason: SDUPTHER

## 2018-09-16 RX ORDER — FLUCONAZOLE 200 MG/1
400 TABLET ORAL DAILY
Status: DISCONTINUED | OUTPATIENT
Start: 2018-09-16 | End: 2018-09-16 | Stop reason: HOSPADM

## 2018-09-16 RX ORDER — POTASSIUM CHLORIDE 20 MEQ/1
40 TABLET, EXTENDED RELEASE ORAL ONCE
Status: COMPLETED | OUTPATIENT
Start: 2018-09-16 | End: 2018-09-16

## 2018-09-16 RX ADMIN — POTASSIUM CHLORIDE 40 MEQ: 1500 TABLET, EXTENDED RELEASE ORAL at 08:09

## 2018-09-16 RX ADMIN — DEXAMETHASONE 1 DROP: 1 SUSPENSION OPHTHALMIC at 06:09

## 2018-09-16 RX ADMIN — SEVELAMER CARBONATE 800 MG: 800 TABLET, FILM COATED ORAL at 08:09

## 2018-09-16 RX ADMIN — MAGNESIUM OXIDE TAB 400 MG (241.3 MG ELEMENTAL MG) 400 MG: 400 (241.3 MG) TAB at 08:09

## 2018-09-16 RX ADMIN — CYTARABINE 5040 MG: 100 INJECTION, SOLUTION INTRATHECAL; INTRAVENOUS; SUBCUTANEOUS at 03:09

## 2018-09-16 RX ADMIN — METOPROLOL SUCCINATE 12.5 MG: 25 TABLET, EXTENDED RELEASE ORAL at 06:09

## 2018-09-16 RX ADMIN — DEXAMETHASONE 1 DROP: 1 SUSPENSION OPHTHALMIC at 01:09

## 2018-09-16 RX ADMIN — SODIUM CHLORIDE: 0.9 INJECTION, SOLUTION INTRAVENOUS at 08:09

## 2018-09-16 RX ADMIN — FLUCONAZOLE 400 MG: 200 TABLET ORAL at 03:09

## 2018-09-16 RX ADMIN — DEXAMETHASONE 8 MG: 4 TABLET ORAL at 03:09

## 2018-09-16 RX ADMIN — SEVELAMER CARBONATE 800 MG: 800 TABLET, FILM COATED ORAL at 06:09

## 2018-09-16 RX ADMIN — DIPHENHYDRAMINE HYDROCHLORIDE 25 MG: 25 CAPSULE ORAL at 01:09

## 2018-09-16 RX ADMIN — ONDANSETRON 8 MG: 4 TABLET, FILM COATED ORAL at 03:09

## 2018-09-16 RX ADMIN — ACYCLOVIR 400 MG: 200 CAPSULE ORAL at 08:09

## 2018-09-16 RX ADMIN — LORAZEPAM 0.5 MG: 0.5 TABLET ORAL at 08:09

## 2018-09-16 RX ADMIN — ACETAMINOPHEN 650 MG: 325 TABLET, FILM COATED ORAL at 01:09

## 2018-09-16 RX ADMIN — SEVELAMER CARBONATE 800 MG: 800 TABLET, FILM COATED ORAL at 01:09

## 2018-09-16 RX ADMIN — ESCITALOPRAM OXALATE 10 MG: 10 TABLET ORAL at 08:09

## 2018-09-16 RX ADMIN — CYTARABINE 5040 MG: 100 INJECTION, SOLUTION INTRATHECAL; INTRAVENOUS; SUBCUTANEOUS at 04:09

## 2018-09-16 RX ADMIN — PANTOPRAZOLE SODIUM 40 MG: 40 TABLET, DELAYED RELEASE ORAL at 08:09

## 2018-09-16 NOTE — NURSING
ChemoNote: Consents on chart. Pre meds given. cytarabine (PF) (CYTOSAR) 3,000 mg/m2 = 5,040 mg in sodium chloride 0.9% 250 mL to be infused over 3 hrs to the right forearm with flash back of blood noted. Patient understands to call if site leaks or is painful. Patient on chemo precautions Denies any pain. No other questions asked. Patient to be discharged to home after chemo is completed.

## 2018-09-16 NOTE — DISCHARGE SUMMARY
[facility]  Hematology  Bone Marrow Transplant  Discharge Summary      Patient Name: Sabrina Mac  MRN: [id number]  Admission Date: 9/11/2018  Hospital Length of Stay: 5 days  Discharge Date and Time:  09/16/2018 1:07 PM  Attending Physician: Jeromy Guerrier MD   Discharging Provider: Jag Cowart MD  Primary Care Provider: Primary Doctor Earnestine    HPI:   Primary Oncologic Diagnosis: AML, FLT 3 + and NPMN1+.   History of Present Ilness:   Sabrina Mac (Sabrina) is a pleasant 68 y.o.female electively admitted for cycle 2 of HiDAC for AML. She has previously had 2 induction therapies for AML. She was in the hospital roughly 50 days to receive 7+3 and then FLAG JENNIFER. Was previously admitted on 8/14/18 for HiDAC#1.     Oncology History:   68 y.o. female admitted overnight for possible new AML. Came in from OSH with elevated WBC of 100 on 05/25/2018.  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  -5/28/18: started 7+3 induction  -6/11/18: restaging BM bx done, shows persistent disease with 15% blasts. Discussion with patient regarding treatment and she is deciding if she wants to proceed with re-induction vs outpatient maintenance.   -06/15/2018: started Flag-JENNIFER.   --hospitalized 7/23-7/25 for C.diff diarrhea, neutropenic fever.  While inpatient she developed pink blisters on her palm that were concerning for relapse.  --palm biopsy on 7/26/18 for suspicion of leukemia cutis was also negative for sign of relapse  --bone marrow biopsy 7/31/18 was negative for signs of relapse   --Admitted on 8/14/18 for HiDAC#1  --Admitted on 9/11/18 for HiDAC#2    * No surgery found *     Hospital Course: Admitted electively for cycle 2 of HiDAC for AML. Good tolerance of her chemotherapy except nausea and vomiting was managed with antiemetics and her home Benzodiazepine (Lorazepam). She was actively ambulating, maintain his mentation, and she was having fairly stable and baseline appetite 25%-100% of her meal  completed. She did not require any pRBC or Platelet transfusion during this cycle. Her sixth dose of HiDAC completed the evening of 9/16/2018. Good tolerance with no complain of chest pain, shortness of breath, or change in mental status. Discharged with her PPx Cipro Fluconazole and Acyclovir and one pRBC before her discharge.         Significant Diagnostic Studies: Labs:   CMP   Recent Labs   Lab  09/15/18   0455  09/16/18   0503   NA  140  145   K  4.3  3.6   CL  109  111*   CO2  23  23   GLU  111*  94   BUN  14  11   CREATININE  0.7  0.7   CALCIUM  9.8  9.5   PROT  5.5*  5.7*   ALBUMIN  3.0*  3.1*   BILITOT  0.8  0.8   ALKPHOS  80  76   AST  12  16   ALT  6*  7*   ANIONGAP  8  11   ESTGFRAFRICA  >60.0  >60.0   EGFRNONAA  >60.0  >60.0   , CBC   Recent Labs   Lab  09/15/18   0455  09/16/18   0503   WBC  2.24*  1.29*   HGB  7.3*  7.3*   HCT  21.8*  21.8*   PLT  109*  96*   , INR   Lab Results   Component Value Date    INR 1.1 05/31/2018    INR 1.1 05/28/2018    INR 1.1 05/25/2018    and Lipid Panel No results found for: CHOL, HDL, LDLCALC, TRIG, CHOLHDL  Microbiology:   Blood Culture   Lab Results   Component Value Date    LABBLOO No growth after 5 days. 07/22/2018   , Sputum Culture No results found for: GSRESP, RESPIRATORYC and Urine Culture    Lab Results   Component Value Date    LABURIN  06/11/2018     Multiple organisms isolated. None in predominance.  Repeat if    LABURIN clinically necessary. 06/11/2018       Pending Diagnostic Studies:     None        Final Active Diagnoses:    Diagnosis Date Noted POA    PRINCIPAL PROBLEM:  AML (acute myeloblastic leukemia) [C92.00] 08/14/2018 Yes    Diarrhea [R19.7] 09/14/2018 No    Chronic pain [G89.29] 09/11/2018 Yes    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, T45.1X5A] 07/03/2018 Yes    Pancytopenia due to chemotherapy [D61.810] 06/13/2018 Yes    Depression [F32.9] 05/25/2018 Yes      Problems Resolved During this Admission:      Discharged Condition:  stable    Disposition:     Follow Up:  Follow-up Information     Please follow up.    Why:  Labs- 2x / week as advised   Contact information:  [facility]  Address: [place] [address]  Phone: [phone]                  Patient Instructions:   No discharge procedures on file.  Medications:  Reconciled Home Medications:      Medication List      START taking these medications    dexamethasone 0.1 % ophthalmic solution  Commonly known as:  DECADRON  Place 1 drop into both eyes every 6 (six) hours. Through 9/19/18.        CHANGE how you take these medications    midostaurin 25 mg Cap  Commonly known as:  RYDAPT  Take 50 mg by mouth 2 (two) times daily. Take only if instructed by Dr. Troy to restart.  What changed:  additional instructions        CONTINUE taking these medications    acyclovir 200 MG capsule  Commonly known as:  ZOVIRAX  Take 2 capsules (400 mg total) by mouth 2 (two) times daily.     ALPRAZolam 0.25 MG tablet  Commonly known as:  XANAX  Take 1 tablet (0.25 mg total) by mouth 3 (three) times daily as needed for Anxiety.     ciprofloxacin HCl 500 MG tablet  Commonly known as:  CIPRO  Take 1 tablet (500 mg total) by mouth every 12 (twelve) hours.     escitalopram oxalate 10 MG tablet  Commonly known as:  LEXAPRO  Take 10 mg by mouth once daily.     fluconazole 200 MG Tab  Commonly known as:  DIFLUCAN  Take 2 tablets (400 mg total) by mouth once daily.     LORazepam 0.5 MG tablet  Commonly known as:  ATIVAN  Take 1 tablet (0.5 mg total) by mouth every 6 (six) hours as needed (nausea).     magnesium oxide 400 mg tablet  Commonly known as:  MAG-OX  Take 2 tablets (800 mg total) by mouth 2 (two) times daily.     metoprolol succinate 25 MG 24 hr tablet  Commonly known as:  TOPROL-XL  Take 0.5 tablets (12.5 mg total) by mouth once daily.     omeprazole 20 MG tablet  Commonly known as:  PRILOSEC OTC  Take 20 mg by mouth every morning.     ondansetron 8 MG tablet  Commonly known as:   ZOFRAN  Take 1 tablet (8 mg total) by mouth every 12 (twelve) hours as needed for Nausea.     prochlorperazine 10 MG tablet  Commonly known as:  COMPAZINE  Take 1 tablet (10 mg total) by mouth 4 (four) times daily.     vitamin D 1000 units Tab  Commonly known as:  VITAMIN D3  Take 1 tablet (1,000 Units total) by mouth once daily.        STOP taking these medications    hydrocortisone 2.5 % cream     morphine 15 MG 12 hr tablet  Commonly known as:  MS CONTIN     morphine 15 MG tablet  Commonly known as:  HECTOR Cowart MD  Bone Marrow Transplant  [facility]

## 2018-09-16 NOTE — ASSESSMENT & PLAN NOTE
- Received 7+3, day 14 bone marrow biopsy completed 6/11 showing persistent disease with 15% CD 34 positive blasts; was reinduced with FLAG-JENNIFER, day 14 restaging bone marrow biopsy done 6/29 without complication, results with no evidence of AML, FLT 3 negative  - Repeat marrow at count recovery 7/31 showed no evidence of AML  - Derm bx of right palm negative for leukemia cutis and sweets  - Cycle 1 HiDAC (8/14/18)  - Day 5 of Cycle 2 HiDAC with tentative plan for 6 doses will be finished today with tentative plan for discharge after finishing her chemotherapy   - Not neutropenic yet, only on Acyclovir

## 2018-09-16 NOTE — PT/OT/SLP EVAL
"Physical Therapy Evaluation    Patient Name:  Sabrina Mac   MRN:  [id number]    Recommendations:     Discharge Recommendations:  home   Discharge Equipment Recommendations: none   Barriers to discharge: None    Assessment:     Sabrina Mac is a 68 y.o. female admitted with a medical diagnosis of AML (acute myeloblastic leukemia).  She presents with the following impairments/functional limitations:  impaired endurance.      Pt will be discharged after 6 doses of HiDAC.  PT will monitor pt during chemo treatment to inhibit any decline in functional mobility.     Rehab Prognosis:  good; patient would benefit from acute skilled PT services to address these deficits and reach maximum level of function.      Recent Surgery: * No surgery found *      Plan:     During this hospitalization, patient to be seen 1 x/week to address the above listed problems via gait training, therapeutic activities, therapeutic exercises, neuromuscular re-education  · Plan of Care Expires:  10/16/18   Plan of Care Reviewed with: patient    Subjective     Communicated with nursing prior to session.  Patient found in supine upon PT entry to room, agreeable to evaluation.      "I'm a little nauseated and I have anxiety."    Chief Complaint: Nausea  Patient comments/goals: To go home  Pain/Comfort:  · Pain Rating 1: 0/10    Patients cultural, spiritual, Confucianism conflicts given the current situation: no    Living Environment:  Pt lives with spouse, SSH, [address].  Pt and spouse drive.   Prior to admission, patients level of function was I with gait, S with bathing and bath transfers.  Patient has the following equipment: none.  DME owned (not currently used): none.  Upon discharge, patient will have assistance from spouse.    Objective:     Patient found with: peripheral IV     General Precautions: Standard, fall, neutropenic   Orthopedic Precautions:N/A   Braces: N/A     Exams:  · Cognitive Exam:  Patient is oriented to Person, Place, Time " and Situation  · Fine Motor Coordination:    · -       Intact  Left hand thumb/finger opposition skills and Right hand thumb/finger opposition skills  · Gross Motor Coordination:  WFL  · Postural Exam:  Patient presented with the following abnormalities:    · -       No postural abnormalities identified  · Sensation:    · -       Intact  light/touch B LEs  · Skin Integrity/Edema:      · -       Skin integrity: Visible skin intact  · -       Edema: None noted B LEs  · RUE ROM: WFL  · RUE Strength: WFL  · LUE ROM: WFL  · LUE Strength: WFL  · RLE ROM: WFL  · RLE Strength: WFL  · LLE ROM: WFL  · LLE Strength: WFL    Functional Mobility:  · Bed Mobility:     · Scooting: independence  · Supine to Sit: independence  · Sit to Supine: independence  · Transfers:     · Sit to Stand:  independence with no AD  · Bed to Chair: independence with  no AD  using  Stand Pivot  · Gait: Pt 384', I, without AD, discont gait training at pts request  · Balance: I: dynamic standing balance without AD    AM-PAC 6 CLICK MOBILITY  Total Score:24       Therapeutic Activities and Exercises:   Whiteboard updated  Ed on walking program expectations    Patient left supine with all lines intact, call button in reach and nursing notified.    GOALS:   Multidisciplinary Problems     Physical Therapy Goals        Problem: Physical Therapy Goal    Goal Priority Disciplines Outcome Goal Variances Interventions   Physical Therapy Goal   (Resolved)     PT, PT/OT Outcome(s) achieved     Description:  Goals to be met by: 18     Patient will increase functional independence with mobility by performin. Gait  x 1,000 feet with [place] in consecutive PT sessions - Not met             Problem: Physical Therapy Goal    Goal Priority Disciplines Outcome Goal Variances Interventions   Physical Therapy Goal     PT, PT/OT Ongoing (interventions implemented as appropriate)     Description:  Goals to be met by: 18     Patient will increase  functional independence with mobility by performin. Gait  x 500 feet with [place].   2. Lower extremity exercise program x 20 reps per handout, with independence.                Multidisciplinary Problems (Resolved)        Problem: Physical Therapy Goal    Goal Priority Disciplines Outcome Goal Variances Interventions   Physical Therapy Goal   (Resolved)     PT, PT/OT Outcome(s) achieved     Description:  Goals to be met by: 18    1. Pt will be (I) in LE HEP in order to maintain LE strength and prevent deconditioning during hospital admission.  2. Pt will be (I) with daily walking program involving ambulating community distance 3x daily with family or RN assist as needed.                     History:     Past Medical History:   Diagnosis Date    Cancer 2018    AML    CHF (congestive heart failure)     Diarrhea 2018    Encounter for blood transfusion        Past Surgical History:   Procedure Laterality Date    CHOLECYSTECTOMY      HYSTERECTOMY      TONSILLECTOMY         Clinical Decision Making:     History  Co-morbidities and personal factors that may impact the plan of care Examination  Body Structures and Functions, activity limitations and participation restrictions that may impact the plan of care Clinical Presentation   Decision Making/ Complexity Score   Co-morbidities:   [] Time since onset of injury / illness / exacerbation  [] Status of current condition  []Patient's cognitive status and safety concerns    [] Multiple Medical Problems (see med hx)  Personal Factors:   [] Patient's age  [] Prior Level of function   [] Patient's home situation (environment and family support)  [] Patient's level of motivation  [] Expected progression of patient      HISTORY:(criteria)    [] 02160 - no personal factors/history    [x] 43021 - has 1-2 personal factor/comorbidity     [] 59380 - has >3 personal factor/comorbidity     Body Regions:  [] Objective examination findings  [] Head     []   Neck  [] Trunk   [] Upper Extremity  [] Lower Extremity    Body Systems:  [] For communication ability, affect, cognition, language, and learning style: the assessment of the ability to make needs known, consciousness, orientation (person, place, and time), expected emotional /behavioral responses, and learning preferences (eg, learning barriers, education  needs)  [x] For the neuromuscular system: a general assessment of gross coordinated movement (eg, balance, gait, locomotion, transfers, and transitions) and motor function  (motor control and motor learning)  [x] For the musculoskeletal system: the assessment of gross symmetry, gross range of motion, gross strength, height, and weight  [] For the integumentary system: the assessment of pliability(texture), presence of scar formation, skin color, and skin integrity  [] For cardiovascular/pulmonary system: the assessment of heart rate, respiratory rate, blood pressure, and edema     Activity limitations:    [] Patient's cognitive status and saf ety concerns          [] Status of current condition      [] Weight bearing restriction  [] Cardiopulmunary Restriction    Participation Restrictions:   [] Goals and goal agreement with the patient     [] Rehab potential (prognosis) and probable outcome      Examination of Body System: (criteria)    [x] 83613 - addressing 1-2 elements    [] 18095 - addressing a total of 3 or more elements     [] 67501 -  Addressing a total of 4 or more elements         Clinical Presentation: (criteria)  Stable - 12248     On examination of body system using standardized tests and measures patient presents with 1-2 elements from any of the following: body structures and functions, activity limitations, and/or participation restrictions.  Leading to a clinical presentation that is considered stable and/or uncomplicated                              Clinical Decision Making  (Eval Complexity):  Low- 84001     Time Tracking:     PT Received On:  09/16/18  PT Start Time: 1124     PT Stop Time: 1140  PT Total Time (min): 16 min     Billable Minutes: Evaluation 8 and Gait Training 8      Vicki Campbell, PT  09/16/2018

## 2018-09-16 NOTE — PLAN OF CARE
Problem: Patient Care Overview  Goal: Plan of Care Review  Outcome: Ongoing (interventions implemented as appropriate)  Patient to be discharged this late evening after chemotherapy is completed. Patient to get 1 unit of PRBC today. Patient has been afebrile, denies any discomfort. Daughter at bedside. Patient remains on neutropenic and bleeding precautions. Will cont to monitor.

## 2018-09-16 NOTE — PLAN OF CARE
Problem: Physical Therapy Goal  Goal: Physical Therapy Goal  Goals to be met by: 18     Patient will increase functional independence with mobility by performin. Gait  x 500 feet with [place].   2. Lower extremity exercise program x 20 reps per handout, with independence.    Outcome: Ongoing (interventions implemented as appropriate)  PT goals established.

## 2018-09-16 NOTE — PROGRESS NOTES
[facility]  Hematology  Bone Marrow Transplant  Progress Note    Patient Name: Sabrina Mac  Admission Date: 9/11/2018  Hospital Length of Stay: 5 days  Code Status: Full Code  Interval Hx: Last sixth dose of HiDAC will be finished this evening, and she has no complain over the night, mild nausea with no vomiting maanged with antiemetics and her home Lorazepam.Patient denies chest pain, SOB, difficulty in breathing, and had one bowel movement and actively ambulating with no restriction.     Objective:     Vital Signs (Most Recent):  Temp: 98.4 °F (36.9 °C) (09/16/18 0718)  Pulse: 76 (09/16/18 0718)  Resp: 18 (09/16/18 0718)  BP: 119/60 (09/16/18 0718)  SpO2: 97 % (09/16/18 0718) Vital Signs (24h Range):  Temp:  [97.6 °F (36.4 °C)-98.4 °F (36.9 °C)] 98.4 °F (36.9 °C)  Pulse:  [61-76] 76  Resp:  [18-20] 18  SpO2:  [96 %-99 %] 97 %  BP: (119-159)/(57-70) 119/60     Weight: 61.5 kg (135 lb 9.3 oz)  Body mass index is 24.02 kg/m².  Body surface area is 1.65 meters squared.    ECOG SCORE         ECOG Performance Status Score:  2 Ambulatory and capable of all selfcare but unable to carry out any work activities; up and about more than 50% of waking hours.    Lines/Drains/Airways     Peripheral Intravenous Line                 Peripheral IV - Single Lumen 09/14/18 0520 Left Antecubital 2 days                Physical Exam   Constitutional: She is oriented to person, place, and time. She appears well-nourished. No distress.   HENT:   Head: Normocephalic and atraumatic.   alopecia   Eyes: Conjunctivae and EOM are normal. No scleral icterus.   Neck: Normal range of motion. Neck supple.   Cardiovascular: Normal rate and regular rhythm.   No murmur heard.  Pulmonary/Chest: Effort normal. No respiratory distress. She has no wheezes. She has no rales.   Abdominal: Soft. She exhibits no distension. There is no tenderness. There is no guarding.   Musculoskeletal: Normal range of motion. She exhibits no edema  or tenderness.   Lymphadenopathy:     She has no cervical adenopathy.   Neurological: She is alert and oriented to person, place, and time.   Skin: Skin is warm. No rash noted. She is not diaphoretic. No erythema.   Psychiatric: She has a normal mood and affect.       Significant Labs:   CBC:   Recent Labs   Lab  09/15/18   0455  09/16/18   0503   WBC  2.24*  1.29*   HGB  7.3*  7.3*   HCT  21.8*  21.8*   PLT  109*  96*    and CMP:   Recent Labs   Lab  09/15/18   0455  09/16/18   0503   NA  140  145   K  4.3  3.6   CL  109  111*   CO2  23  23   GLU  111*  94   BUN  14  11   CREATININE  0.7  0.7   CALCIUM  9.8  9.5   PROT  5.5*  5.7*   ALBUMIN  3.0*  3.1*   BILITOT  0.8  0.8   ALKPHOS  80  76   AST  12  16   ALT  6*  7*   ANIONGAP  8  11   EGFRNONAA  >60.0  >60.0       Diagnostic Results:  I have reviewed all pertinent imaging results/findings within the past 24 hours.    Assessment/Plan:     * AML (acute myeloblastic leukemia)    - Received 7+3, day 14 bone marrow biopsy completed 6/11 showing persistent disease with 15% CD 34 positive blasts; was reinduced with FLAG-JENNIFER, day 14 restaging bone marrow biopsy done 6/29 without complication, results with no evidence of AML, FLT 3 negative  - Repeat marrow at count recovery 7/31 showed no evidence of AML  - Derm bx of right palm negative for leukemia cutis and sweets  - Cycle 1 HiDAC (8/14/18)  - Day 5 of Cycle 2 HiDAC with tentative plan for 6 doses will be finished today with tentative plan for discharge after finishing her chemotherapy   - Not neutropenic yet, only on Acyclovir         Chronic pain    - home pain regimen: MSIR and MS contin listed  - Oxycodone ordered PRN        Chemotherapy induced cardiomyopathy    - Patient of Dr. Agarwal's last visit on 8/28/18, improved from 30% to 60%.   - Resume on Metoprolol 12.5 mg PO BID with good tolerance.   - Recommended ECG and troponin level monthly while taking midostaurin to monitor the QT interval as well as for any  "early sign of cardiac toxicity.   - Recommend repeating her echo in 3 months and with any abnormal troponin level."        Pancytopenia due to chemotherapy    - transfuse for hgb <7 and plt <10K, stable no indications for transfusion         Depression    - continue home lexapro, xanax prn  - Stable problem, no acute change, continue current medication.             VTE Risk Mitigation (From admission, onward)        Ordered     heparin, porcine (PF) 100 unit/mL injection flush 300 Units  As needed (PRN)      09/11/18 0985     Place MAYLIN hose  Until discontinued      09/11/18 9500          Disposition: Home with no need (based on her previous eval)     Jag Cowart MD  Bone Marrow Transplant  [facility]        "

## 2018-09-16 NOTE — SUBJECTIVE & OBJECTIVE
Interval Hx: Last sixth dose of HiDAC will be finished this evening, and she has no complain over the night, mild nausea with no vomiting maanged with antiemetics and her home Lorazepam.Patient denies chest pain, SOB, difficulty in breathing, and had one bowel movement and actively ambulating with no restriction.     Objective:     Vital Signs (Most Recent):  Temp: 98.4 °F (36.9 °C) (09/16/18 0718)  Pulse: 76 (09/16/18 0718)  Resp: 18 (09/16/18 0718)  BP: 119/60 (09/16/18 0718)  SpO2: 97 % (09/16/18 0718) Vital Signs (24h Range):  Temp:  [97.6 °F (36.4 °C)-98.4 °F (36.9 °C)] 98.4 °F (36.9 °C)  Pulse:  [61-76] 76  Resp:  [18-20] 18  SpO2:  [96 %-99 %] 97 %  BP: (119-159)/(57-70) 119/60     Weight: 61.5 kg (135 lb 9.3 oz)  Body mass index is 24.02 kg/m².  Body surface area is 1.65 meters squared.    ECOG SCORE         ECOG Performance Status Score:  2 Ambulatory and capable of all selfcare but unable to carry out any work activities; up and about more than 50% of waking hours.    Lines/Drains/Airways     Peripheral Intravenous Line                 Peripheral IV - Single Lumen 09/14/18 0520 Left Antecubital 2 days                Physical Exam   Constitutional: She is oriented to person, place, and time. She appears well-nourished. No distress.   HENT:   Head: Normocephalic and atraumatic.   alopecia   Eyes: Conjunctivae and EOM are normal. No scleral icterus.   Neck: Normal range of motion. Neck supple.   Cardiovascular: Normal rate and regular rhythm.   No murmur heard.  Pulmonary/Chest: Effort normal. No respiratory distress. She has no wheezes. She has no rales.   Abdominal: Soft. She exhibits no distension. There is no tenderness. There is no guarding.   Musculoskeletal: Normal range of motion. She exhibits no edema or tenderness.   Lymphadenopathy:     She has no cervical adenopathy.   Neurological: She is alert and oriented to person, place, and time.   Skin: Skin is warm. No rash noted. She is not diaphoretic.  No erythema.   Psychiatric: She has a normal mood and affect.       Significant Labs:   CBC:   Recent Labs   Lab  09/15/18   0455  09/16/18   0503   WBC  2.24*  1.29*   HGB  7.3*  7.3*   HCT  21.8*  21.8*   PLT  109*  96*    and CMP:   Recent Labs   Lab  09/15/18   0455  09/16/18   0503   NA  140  145   K  4.3  3.6   CL  109  111*   CO2  23  23   GLU  111*  94   BUN  14  11   CREATININE  0.7  0.7   CALCIUM  9.8  9.5   PROT  5.5*  5.7*   ALBUMIN  3.0*  3.1*   BILITOT  0.8  0.8   ALKPHOS  80  76   AST  12  16   ALT  6*  7*   ANIONGAP  8  11   EGFRNONAA  >60.0  >60.0       Diagnostic Results:  I have reviewed all pertinent imaging results/findings within the past 24 hours.

## 2018-09-16 NOTE — PROGRESS NOTES
cytarabine (PF) (CYTOSAR) 3,000 mg/m2 = 5,040 mg in sodium chloride 0.9% 250 mL chemo infusion started through right hand 20g noted for having positive blood retur. Chemotherapy dosage and BSA checked by two chemotherapy certified nurses prior to administration. Chemotherapy education done prior to hanging of chemotherapy. Chemotherapeutic precautions in place throughout therapy.  Will continue to monitor.

## 2018-09-17 NOTE — PT/OT/SLP DISCHARGE
Physical Therapy Discharge Summary    Name: Sabrina Mac  MRN: [id number]   Principal Problem: AML (acute myeloblastic leukemia)     Patient Discharged from acute Physical Therapy on 18.  Please refer to prior PT noted date on 18 for functional status.     Assessment:     Patient is not appropriate for therapy.    Objective:     GOALS:   Multidisciplinary Problems     Physical Therapy Goals        Problem: Physical Therapy Goal    Goal Priority Disciplines Outcome Goal Variances Interventions   Physical Therapy Goal   (Resolved)     PT, PT/OT Outcome(s) achieved     Description:  Goals to be met by: 18     Patient will increase functional independence with mobility by performin. Gait  x 1,000 feet with [place] in consecutive PT sessions - Not met             Problem: Physical Therapy Goal    Goal Priority Disciplines Outcome Goal Variances Interventions   Physical Therapy Goal     PT, PT/OT Ongoing (interventions implemented as appropriate)     Description:  Goals to be met by: 18     Patient will increase functional independence with mobility by performin. Gait  x 500 feet with [place].   2. Lower extremity exercise program x 20 reps per handout, with independence.                Multidisciplinary Problems (Resolved)        Problem: Physical Therapy Goal    Goal Priority Disciplines Outcome Goal Variances Interventions   Physical Therapy Goal   (Resolved)     PT, PT/OT Outcome(s) achieved     Description:  Goals to be met by: 18    1. Pt will be (I) in LE HEP in order to maintain LE strength and prevent deconditioning during hospital admission.  2. Pt will be (I) with daily walking program involving ambulating community distance 3x daily with family or RN assist as needed.                     Reasons for Discontinuation of Therapy Services  Therapist determines that the patient will no longer benefit from therapy services.      Plan:     Patient Discharged to: Home  no PT services needed.    Vicki Campbell, PT  9/17/2018

## 2018-09-17 NOTE — NURSING
Ter here to transport Home. Departed unit in Chemo infusion complete w/o complication. Flushed w 40cc NS and DC'd RFA IV.No complications to site.  Applied guaze and stretchy tape  Applied pressure till hemostasis. BR slightly elevated pt asymptomatic. [place] reviewed, questions answered. Daughter @ bedside to transport home. Departed unit in .

## 2018-09-26 ENCOUNTER — INFUSION (OUTPATIENT)
Dept: INFUSION THERAPY | Facility: HOSPITAL | Age: 68
End: 2018-09-26
Attending: INTERNAL MEDICINE
Payer: MEDICARE

## 2018-09-26 ENCOUNTER — OFFICE VISIT (OUTPATIENT)
Dept: HEMATOLOGY/ONCOLOGY | Facility: CLINIC | Age: 68
End: 2018-09-26
Payer: MEDICARE

## 2018-09-26 VITALS
SYSTOLIC BLOOD PRESSURE: 147 MMHG | HEART RATE: 80 BPM | TEMPERATURE: 99 F | DIASTOLIC BLOOD PRESSURE: 67 MMHG | RESPIRATION RATE: 18 BRPM

## 2018-09-26 VITALS
RESPIRATION RATE: 24 BRPM | HEART RATE: 101 BPM | BODY MASS INDEX: 25.2 KG/M2 | DIASTOLIC BLOOD PRESSURE: 58 MMHG | HEIGHT: 63 IN | TEMPERATURE: 98 F | SYSTOLIC BLOOD PRESSURE: 120 MMHG | OXYGEN SATURATION: 100 % | WEIGHT: 142.19 LBS

## 2018-09-26 DIAGNOSIS — D64.81 ANTINEOPLASTIC CHEMOTHERAPY INDUCED ANEMIA: ICD-10-CM

## 2018-09-26 DIAGNOSIS — T45.1X5A CINV (CHEMOTHERAPY-INDUCED NAUSEA AND VOMITING): ICD-10-CM

## 2018-09-26 DIAGNOSIS — T45.1X5A CHEMOTHERAPY INDUCED CARDIOMYOPATHY: ICD-10-CM

## 2018-09-26 DIAGNOSIS — D64.9 SYMPTOMATIC ANEMIA: ICD-10-CM

## 2018-09-26 DIAGNOSIS — R11.2 CINV (CHEMOTHERAPY-INDUCED NAUSEA AND VOMITING): ICD-10-CM

## 2018-09-26 DIAGNOSIS — E43 SEVERE MALNUTRITION: ICD-10-CM

## 2018-09-26 DIAGNOSIS — D64.9 SYMPTOMATIC ANEMIA: Primary | ICD-10-CM

## 2018-09-26 DIAGNOSIS — I42.7 CHEMOTHERAPY INDUCED CARDIOMYOPATHY: ICD-10-CM

## 2018-09-26 DIAGNOSIS — C92.01 ACUTE MYELOID LEUKEMIA IN REMISSION: ICD-10-CM

## 2018-09-26 DIAGNOSIS — K12.30 MUCOSITIS: ICD-10-CM

## 2018-09-26 DIAGNOSIS — T45.1X5A ANTINEOPLASTIC CHEMOTHERAPY INDUCED ANEMIA: ICD-10-CM

## 2018-09-26 DIAGNOSIS — C92.01 ACUTE MYELOID LEUKEMIA IN REMISSION: Primary | ICD-10-CM

## 2018-09-26 LAB
BLD PROD TYP BPU: NORMAL
BLD PROD TYP BPU: NORMAL
BLOOD UNIT EXPIRATION DATE: NORMAL
BLOOD UNIT EXPIRATION DATE: NORMAL
BLOOD UNIT TYPE CODE: 600
BLOOD UNIT TYPE CODE: 6200
BLOOD UNIT TYPE: NORMAL
BLOOD UNIT TYPE: NORMAL
CODING SYSTEM: NORMAL
CODING SYSTEM: NORMAL
DISPENSE STATUS: NORMAL
DISPENSE STATUS: NORMAL
NUM UNITS TRANS PACKED RBC: NORMAL
NUM UNITS TRANS WBC-POOR PLATPHERESIS: NORMAL

## 2018-09-26 PROCEDURE — P9040 RBC LEUKOREDUCED IRRADIATED: HCPCS

## 2018-09-26 PROCEDURE — 99215 OFFICE O/P EST HI 40 MIN: CPT | Mod: S$PBB,,, | Performed by: INTERNAL MEDICINE

## 2018-09-26 PROCEDURE — 36430 TRANSFUSION BLD/BLD COMPNT: CPT

## 2018-09-26 PROCEDURE — 86920 COMPATIBILITY TEST SPIN: CPT

## 2018-09-26 PROCEDURE — P9037 PLATE PHERES LEUKOREDU IRRAD: HCPCS

## 2018-09-26 PROCEDURE — 99214 OFFICE O/P EST MOD 30 MIN: CPT | Mod: PBBFAC,25 | Performed by: INTERNAL MEDICINE

## 2018-09-26 PROCEDURE — 99999 PR PBB SHADOW E&M-EST. PATIENT-LVL IV: CPT | Mod: PBBFAC,,, | Performed by: INTERNAL MEDICINE

## 2018-09-26 PROCEDURE — 25000003 PHARM REV CODE 250: Performed by: INTERNAL MEDICINE

## 2018-09-26 RX ORDER — HYDROCODONE BITARTRATE AND ACETAMINOPHEN 500; 5 MG/1; MG/1
TABLET ORAL ONCE
Status: CANCELLED | OUTPATIENT
Start: 2018-09-26 | End: 2018-09-26

## 2018-09-26 RX ORDER — DIPHENHYDRAMINE HYDROCHLORIDE 50 MG/ML
25 INJECTION INTRAMUSCULAR; INTRAVENOUS
Status: DISCONTINUED | OUTPATIENT
Start: 2018-09-26 | End: 2018-09-26

## 2018-09-26 RX ORDER — ACETAMINOPHEN 325 MG/1
650 TABLET ORAL
Status: COMPLETED | OUTPATIENT
Start: 2018-09-26 | End: 2018-09-26

## 2018-09-26 RX ORDER — ACETAMINOPHEN 325 MG/1
650 TABLET ORAL
Status: CANCELLED | OUTPATIENT
Start: 2018-09-26

## 2018-09-26 RX ORDER — DIPHENHYDRAMINE HYDROCHLORIDE 50 MG/ML
25 INJECTION INTRAMUSCULAR; INTRAVENOUS
Status: CANCELLED | OUTPATIENT
Start: 2018-09-26

## 2018-09-26 RX ORDER — DIPHENHYDRAMINE HCL 25 MG
25 CAPSULE ORAL
Status: COMPLETED | OUTPATIENT
Start: 2018-09-26 | End: 2018-09-26

## 2018-09-26 RX ORDER — HYDROCODONE BITARTRATE AND ACETAMINOPHEN 500; 5 MG/1; MG/1
TABLET ORAL ONCE
Status: DISCONTINUED | OUTPATIENT
Start: 2018-09-26 | End: 2018-09-26 | Stop reason: HOSPADM

## 2018-09-26 RX ORDER — HYDROCODONE BITARTRATE AND ACETAMINOPHEN 500; 5 MG/1; MG/1
TABLET ORAL ONCE
Status: COMPLETED | OUTPATIENT
Start: 2018-09-26 | End: 2018-09-26

## 2018-09-26 RX ADMIN — SODIUM CHLORIDE: 0.9 INJECTION, SOLUTION INTRAVENOUS at 03:09

## 2018-09-26 RX ADMIN — DIPHENHYDRAMINE HYDROCHLORIDE 25 MG: 25 CAPSULE ORAL at 03:09

## 2018-09-26 RX ADMIN — ACETAMINOPHEN 650 MG: 325 TABLET ORAL at 03:09

## 2018-09-26 NOTE — PROGRESS NOTES
Hematology and Medical Oncology   Follow Up Note     9/26/2018    Primary Oncologic Diagnosis: AML, FLT 3 + and NPMN1+.   History of Present Ilness:   Sabrina Badillo) is a pleasant 68 y.o.female presents to clinic following 2 induction therapies for AML. She was in the hospital roughly 50 days to receive 7+3 and then FLAG JENNIFER.    Oncology History:   68 y.o. female admitted overnight for possible new AML. Came in from OSH with elevated WBC of 100.   05/25/2018: Pt is now on hydrea 2 grams BID, allopurinol 300 mg daily, and IVF. Pt may need rasburicase this weekend. She will undergo a BM bx and aspiration today. She is an induction candidate and will not be screened for research trials. She has anxiety for which she usually takes xanax, this was re-started.   05/26/2018 PICC placed. Reports she slept well last night. Anxiety improved with prn xanax. EF 65%, HIV and Hep panel negative. 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  6/12/2018: Day 15 of 7+3 induction, restaging BM bx done yesterday. On Midostaurin. Fever yesterday and BC with gram + cocci in clusters. On cefepime day 2 and will start Vanc today. Labial mucositis improving.   6/13/2018: Day 16 7+3. BC with staph aureus, identification pending. Surveillance blood cultures done today. Afebrile x 48 hours. On cefepime and Vanc. Labial mucositis resolved. No complaints of pain. Nausea controlled. No diarrhea. Primary complaint is fatigue.   6/14/20018: Day 17 of 7+3. Day 14 bone marrow results pending. BC with staph epidermis only sensitive to Vancomycin. Vanc day 3 and cefepime day 4. Vanc trough 12.2 last night. BP remains low but stable. Afebrile x 72 hours.   6/15/2018: Day 18 of 7+3. Day 14 bone marrow biopsy shows persistent disease with 15% blasts. Discussion with patient regarding treatment and she is deciding if she wants to proceed with re-induction vs outpatient maintenance. Temp of 100.4 overnight, unsustained. Day 5 Cefepime and Day 4 of  Vanc for staph epidermis. Repeat cultures NGTD. BP improved. Electrolytes (mag, phos, K and calcium) replaced aggressively this am and will repeat labs this afternoon. No complaints this am.   06/16/2018: Day 19 of 7+3. Day 2 of Flag-JENNIFER. Remains afebrile. Calcium remains low and have increased PO supplementation. Remains on vanc and aztreonam. Somewhat loopy feeling after receiving benadryl.   06/17/2018: Day 20 of 7+3. Day 3 of FLAG-JENNIFER. Multiple electrolyte abnormalities. New bilateral leg and feet swelling.   6/18/2018: Day 21 of 7+3 and Day 4 of FLAG JENNIFER. Tolerating well with some nausea controlled with Zofran. VSS, afebrile. ANC 1900 on Neupogen. Continues Vanc for staph epi bacteremia. Multiple electrolytes replaced today. Complains of edema to lower extremities, not improved after 20 of lasix yesterday. No sob or CP.   6/19/18: Day 22 of 7+3 and Day 5 of FLAG JENNIFER. VSS, afebrile, ANC 3900. Vanc trough elevated and dose adjusted this am. Lower extremity edema improved after 40 of lasix yesterday, net negative 300 cc I&O. Mild nausea, no abdominal pain or diarrhea. Poor appetite but no difficulty eating or taking pills.   6/20/18: Day 23 of 7+3 and Day 6 of FLAG JENNIFER. Patient complains of nausea and vomiting overnight and this am, especially when taking pills. Will add Zyprexa daily in addition to Zofran, compazine, and ativan PRN and will change meds to IV. 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 Afebrile.  No diarrhea, mouth sores or pain.  06/21/2018: Day 24 of 7+3 and Day 7 of FLAG JENNIFER. Nausea better controlled. Continued on Vanc.  6/22/2018: Day 25 of 7+3 and Day 8 of FLAG JENNIFER. Nausea improved some with Zyprexa but did have 1 episode of emesis overnight.continuing meds IV due to vomiting. Remains afebrile. ANC 0. Continues Vanc and Flagyl. Electrolytes replaced.   06/23/2018 : day 26 of 7+3 and Day 9 of FLAG JENNIFER.  Nausea persists,  worsened after taking pills so meds converted to IV.  Encouraged ambulation.  Continue with flagyl for leptotrichia goodfellowii.  RUQ US showed intrahepatic dilation, overall impression hepatic steatosis.  CBD 0.5cm.  No Gallbladder.    06/24/2018 : day 27 of 7+3 and Day 10 of FLAG JENNIFER.  Nausea persisting, able to tolerate some po pills.  Last day of flagyl (day 5).  Still pancytopenic. Appetite still low as po intake triggers nausea.  06/25/2018: day 28 of 7+3 and Day 11 of FLAG JENNIFER. Afebrile, ANC 0. Has completed Flagyl. Will decrease Vanc to 1000 mg q12, trough 19.9, will complete Vanc on 6/27. Liver enzymes stable on Midostaurin. VSS.   6/26/2018: day 29 of 7+3 and day 12 of FLAG JENNIFER. Liver enzymes improved and Midostaurin increased to full dose 50 mg bid. Nausea controlled, afebrile, VSS, NEON.  6/27/2018: day 30 of 7+3 and Day 13 of FLAG JENNIFER. Persistent nausea and emesis x 1 yesterday. Compazine changed to scheduled. Vanc ends today. Afebrile, VSS. Aggressive electrolyte replacement, low electrolytes possibly due to Midostaurin.   6/28/2018: day 31 of 7+3 and Day 14 of FLAG JENNIFER. Nausea improved with scheduled Compazine. Patient has agreed to start converting some IV meds to po. VSS, afebrile, electrolytes wnl today. Only complains of fatigue, new rash noted to upper back and chest and legs, papular rash mildly red.  6/29/2018: Day 32 of 7+3 and Day 15 of FLAG JENNIFER. Day 14 restaging bone marrow biopsy done at bedside today. Patient states nausea improved but she did have emesis last night after taking 9 pm pills. Rash improved. No mouth sores, diarrhea or pain.   7/2/2018: Day 35 of 7+3 and Day 18 of FLAG JENNIFER. Restaging BM bx results pending. Fluid overload over the weekend. Chest x-ray with pulmonary edema. Os sats down to 88%. Placed on O2 at 2 L NC, off O2 this am. Received lasix. Net negative 2.7 L today. 1 episode of nausea, no emesis. Reports anxiety relieved with PRN meds. Electrolytes improved,  afebrile, VSS.   7/3/2018: Day 36 of 7+3 and Day 19 of FLAG JENNIFER. Restaging Bm bx with GABRIEL. Cardiology consulted for abnormal echo, EF 30% with pulmonary HTN and severe L atrial enlargement. EKG with T wave abnormality, possible anterolateral ischemia and prolonged QTc of 530. Medications adjusted. Nausea controlled, no diarrhea. Electrolytes improved. On O2 as needed.   07/04/2018 : Day 37 of 7+3 and Day 20 of FLAG JENNIFER Diarrhea in AM, watery, no associated abdominal pain, nausea, or vomiting.    07/05/2018: Day 38 of 7+3 ane Day 21 of FLAG JENNIFER. No CP or SOB, cardiology following. On RA. All meds changed to po, denies nausea or vomiting and no diarrhea. VSS, afebrile.   7/6/2018: Day 22 of FLAG JENNIFER. Continues to tolerate po meds with no nausea. Afebrile. Awaiting count recovery for discharge.  7/7/2018-/8/2018: Day 40/41 of 7+3, Day 23/24 of FLAG JENNIFER.  Improving clinically.  Started on mag oxide per patient request.  Labs showing signs of ANC recovery.  7/9/2018: Day 25 FLAG JENNIFER, , continues Neupogen. Received platelets today. Afebrile, VSS. Tolerating all oral meds with no nausea or vomiting. Only complains of fatigue.   7/10/2018: Day 26 FLAG JENNIFER,  today. Had 2 episodes of vomiting and diarrhea last night. Feeling better. Afebrile, VSS.   7/11/2018: Day 27 FLAG JENNIFER,  today. No vomiting or diarrhea overnight and no nausea. Afebrile, VSS. Complains of back pain (bone pain) suspect due to count recovery. Relieved with oxy IR and Zyrtec started.  07/12/2018: Day 28 flag jennifer, engrafted today with ANC of 730. Back pain improved with zyrtec. Scheduled for IT chemo tomorrow at 1:30 pm and discharge home thereafter. Will likely need plt transfusion prior to IT chemo tomorrow   7/13/2018: Day 29 of FLAG JENNIFER. ANC up to 1300 today. Transfusing platelets today prior to LP for IT chemo. Patient continues to complain of bone pain, mostly to back and legs. No nausea, diarrhea, sob. Afebrile and VSS.    --hospitalized 7/23-7/25 for C.diff diarrhea, neutropenic fever.  While inpatient she developed pink blisters on her palm that were concerning for relapse.  --palm biopsy on 7/26/18 for suspicion of leukemia cutis was also negative for sign of relapse  --bone marrow biopsy 7/31/18 was negative for signs of relapse   --Admitted on 8/14/18 for HiDAC#1  --Admitted on 9/11/18 for HiDAC#2    Interval History:   Ms. Mac continues to do well at home. Following hidac #2 her energy and spirits have been good. She has had a good appetite. In the last several days she has had She had a very good last week. Her energy has notably improved and appetite has returned to pre leukemia diagnosis level.     Past Medical History:   Past Medical History:   Diagnosis Date    Cancer 03/2018    AML    CHF (congestive heart failure)     Diarrhea 9/14/2018    Encounter for blood transfusion        Current Medications:   Current Outpatient Medications   Medication Sig    acyclovir (ZOVIRAX) 200 MG capsule Take 2 capsules (400 mg total) by mouth 2 (two) times daily.    ALPRAZolam (XANAX) 0.25 MG tablet Take 1 tablet (0.25 mg total) by mouth 3 (three) times daily as needed for Anxiety.    ciprofloxacin HCl (CIPRO) 500 MG tablet Take 1 tablet (500 mg total) by mouth every 12 (twelve) hours.    dexamethasone (DECADRON) 0.1 % ophthalmic solution Place 1 drop into both eyes every 6 (six) hours. Through 9/19/18.    escitalopram oxalate (LEXAPRO) 10 MG tablet Take 10 mg by mouth once daily.    fluconazole (DIFLUCAN) 200 MG Tab Take 2 tablets (400 mg total) by mouth once daily.    LORazepam (ATIVAN) 0.5 MG tablet Take 1 tablet (0.5 mg total) by mouth every 6 (six) hours as needed (nausea).    magnesium oxide (MAG-OX) 400 mg tablet Take 2 tablets (800 mg total) by mouth 2 (two) times daily.    metoprolol succinate (TOPROL-XL) 25 MG 24 hr tablet Take 0.5 tablets (12.5 mg total) by mouth once daily.    midostaurin (RYDAPT) 25 mg Cap  Take 50 mg by mouth 2 (two) times daily. Take only if instructed by Dr. Troy to restart.    omeprazole (PRILOSEC OTC) 20 MG tablet Take 20 mg by mouth every morning.    ondansetron (ZOFRAN) 8 MG tablet Take 1 tablet (8 mg total) by mouth every 12 (twelve) hours as needed for Nausea.    prochlorperazine (COMPAZINE) 10 MG tablet Take 1 tablet (10 mg total) by mouth 4 (four) times daily.    vitamin D 1000 units Tab Take 1 tablet (1,000 Units total) by mouth once daily.     No current facility-administered medications for this visit.      Facility-Administered Medications Ordered in Other Visits   Medication    0.9%  NaCl infusion (for blood administration)     ALLERGIES:   Review of patient's allergies indicates:   Allergen Reactions    Methotrexate analogues      Elevated Liver Enzyme    Bactrim [sulfamethoxazole-trimethoprim] Nausea And Vomiting and Rash       Review of Systems:     Review of Systems   Constitutional: Positive for appetite change and fatigue. Negative for chills, diaphoresis, fever and unexpected weight change.   HENT:   Negative for hearing loss, mouth sores, nosebleeds, sore throat, trouble swallowing and voice change.    Eyes: Negative for eye problems and icterus.   Respiratory: Negative for chest tightness, cough, hemoptysis, shortness of breath and wheezing.    Cardiovascular: Negative for chest pain, leg swelling and palpitations.   Gastrointestinal: Negative for abdominal distention, abdominal pain, blood in stool, diarrhea, nausea and vomiting.   Endocrine: Negative for hot flashes.   Genitourinary: Negative for bladder incontinence, difficulty urinating, dysuria and hematuria.    Musculoskeletal: Negative for arthralgias, back pain, flank pain, gait problem, myalgias, neck pain and neck stiffness.   Skin: Negative for itching, rash and wound.   Neurological: Negative for dizziness, extremity weakness, gait problem, headaches, numbness, seizures and speech difficulty.    Hematological: Negative for adenopathy. Bruises/bleeds easily.   Psychiatric/Behavioral: Positive for sleep disturbance. Negative for confusion and depression. The patient is nervous/anxious.         Physical Exam:     Vitals:    09/26/18 1440   BP: (!) 120/58   Pulse: 101   Resp: (!) 24   Temp: 98.1 °F (36.7 °C)       Physical Exam   Constitutional: She is oriented to person, place, and time. She appears well-developed and well-nourished. No distress.   HENT:   Head: Normocephalic and atraumatic.   Mouth/Throat: Oropharynx is clear and moist. No oropharyngeal exudate.   No hair, wearing a cap     Eyes: Conjunctivae and EOM are normal. Pupils are equal, round, and reactive to light.   Neck: Normal range of motion. Neck supple. No JVD present. No tracheal deviation present. No thyromegaly present.   Cardiovascular: Normal rate, regular rhythm and normal heart sounds. Exam reveals no friction rub.   No murmur heard.  Pulmonary/Chest: Effort normal and breath sounds normal. No stridor. No respiratory distress. She has no wheezes. She has no rales. She exhibits no tenderness.   Abdominal: Soft. Bowel sounds are normal. She exhibits no distension. There is no tenderness. There is no rebound and no guarding.   Musculoskeletal: Normal range of motion. She exhibits no edema, tenderness or deformity.   Lymphadenopathy:     She has no axillary adenopathy.   Neurological: She is alert and oriented to person, place, and time. She displays normal reflexes. No cranial nerve deficit or sensory deficit. She exhibits normal muscle tone. Coordination normal.   Skin: Skin is warm and dry. Capillary refill takes less than 2 seconds. No rash noted. She is not diaphoretic. No erythema. No pallor.   Numerous lower extremity petechie   Psychiatric: She has a normal mood and affect. Her behavior is normal. Judgment and thought content normal.       ECOG Performance Status: (foot note - ECOG PS provided by Eastern Cooperative Oncology  Group) 2 - Symptomatic, <50% confined to bed    Karnofsky Performance Score:  70%- Cares for Self: Unable to Carry on Normal Activity or Active Work    Labs:   Lab Results   Component Value Date    WBC 0.14 (LL) 09/26/2018    HGB 7.1 (L) 09/26/2018    HCT 19.6 (LL) 09/26/2018    PLT 1 (LL) 09/26/2018    ALT 28 09/26/2018    AST 18 09/26/2018     09/26/2018    K 4.6 09/26/2018     09/26/2018    CREATININE 0.8 09/26/2018    BUN 17 09/26/2018    CO2 25 09/26/2018    INR 1.1 05/31/2018       Imaging: previous imaging has been reviewed    Assessment and Plan:     Mrs. Mac is a pleasant 68 year old female with recently diagnosed AML.      Acute Monocytic Leukemia  -- Admitted from [facility] on [facility]/25/18 early AM with WBC around 100s, for suspected new non-M3 AML  --lo res HLA typing on 5/26/18 and hi res on 5/27/18 done in anticipation of possible need for future transplant   --Pt with two daughters, two full sisters (in their mid 60s, one with brain aneurysm hx, other one without any medical issues), and one full brother (59 y/o healthy).  --NPM1 +, CEBPA (-), FLT3 (+).  On Midostaurin 50 mg PO BID until Day 14 (held starting 6/8 to 6/11). Stopped when FLAG JENNIFER re-induction started. Restarted Midostaurin at 25 mg bid on day 8 of FLAG JENNIFER induction (6/22), increase to full dose 50 mg bid (6/26) as improvement in liver enzymes. Stopped 7/3/18 due to abnormal cardiac echo.  --day 14 bone marrow biopsy completed 6/11 showing persistent disease with 15% CD 34 positive blasts  --Day 60 of FLAG-JENNIFER, tolerating without difficulty except nausea/vomiting  --day 14 restaging bone marrow biopsy done 6/29 without complication, results with no evidence of AML, FLT 3 negative, will need repeat marrow at count recovery outpatient   --recovery marrow showed no evidence of disease  --HiDAC #1 on 8/14/18, HiDAC#2 on 9/11/18  --Cycle 3 planned for 10/9/18    Chemotherapy related anemia/thromboctopenia   ----Between  consolidation therapies mon/thurs labs locally with the reports sent to me  --We were able to transfuse blood locally  --Will receive blood and Platelets today in our infusion center    Suspected Leukemia Cutis  --pathology from dermatology confirmed inflammatory changes  --now resolved with topical steroid cream    Abnormal Liver Function Tests  --ALT and AST have now normalized. Alk phos 131 today.   --midostaurin started day 8 at 25 mg bid, monitoring liver enzymes closely and improved. Increasing Midostaurin to 50 mg bid 6/26. Stopped Midostaurin (7/3) due to new diagnosis of systolic heart failure EF 35%.  --6/22/18 liver US showing mild intrahepatic dilation, otherwise impression of hepatic steatosis.  Will hold off of MRCP at this time.     --continue to monitor alk phos    Chemotherapy induced cardiomyopathy  --cardiology follow-up as outpatient in 3 months  --repeat 2D echo on 6/15 with preserved EF of 60%.  However 7/2/18 echo with reduced EF 30-35%  --Echo on 8/15/18 was within normal range EF of 60-65%    30 minutes were spent face to face with the patient and her  family to discuss the disease, natural history, treatment options and survival statistics. I have provided the patient with an opportunity to ask questions and have all questions answered to her satisfaction.       she will return to clinic in 2 weeks in preporation for admission, but knows to call in the interim if symptoms change or should a problem arise.        Laquita Troy MD  Hematology and Medical Oncology  Bone Marrow Transplant  [facility]

## 2018-09-26 NOTE — PLAN OF CARE
Problem: Patient Care Overview  Goal: Individualization & Mutuality  Outcome: Ongoing (interventions implemented as appropriate)  1520-Labs , hx, and medications reviewed, patient is here for blood and platelets, was seen by MD prior to arrival. Assessment completed. Discussed plan of care with patient. Patient in agreement. Chair reclined and warm blanket and snack offered.

## 2018-09-26 NOTE — NURSING
Patient received 1 unit of blood, tolerated well.  Platelets requested from [facility], handoff report given to JANE Rodriguez RN.

## 2018-10-05 ENCOUNTER — TELEPHONE (OUTPATIENT)
Dept: HEMATOLOGY/ONCOLOGY | Facility: CLINIC | Age: 68
End: 2018-10-05

## 2018-10-05 ENCOUNTER — INFUSION (OUTPATIENT)
Dept: INFUSION THERAPY | Facility: HOSPITAL | Age: 68
End: 2018-10-05
Attending: INTERNAL MEDICINE
Payer: MEDICARE

## 2018-10-05 VITALS
RESPIRATION RATE: 18 BRPM | SYSTOLIC BLOOD PRESSURE: 159 MMHG | TEMPERATURE: 99 F | DIASTOLIC BLOOD PRESSURE: 76 MMHG | HEART RATE: 84 BPM

## 2018-10-05 DIAGNOSIS — C93.01 ACUTE MONOCYTIC LEUKEMIA IN REMISSION: Primary | ICD-10-CM

## 2018-10-05 DIAGNOSIS — C93.01 ACUTE MONOCYTIC LEUKEMIA IN REMISSION: ICD-10-CM

## 2018-10-05 LAB
BLD PROD TYP BPU: NORMAL
BLOOD UNIT EXPIRATION DATE: NORMAL
BLOOD UNIT TYPE CODE: 600
BLOOD UNIT TYPE: NORMAL
CODING SYSTEM: NORMAL
DISPENSE STATUS: NORMAL
NUM UNITS TRANS WBC-POOR PLATPHERESIS: NORMAL

## 2018-10-05 PROCEDURE — 25000003 PHARM REV CODE 250: Performed by: INTERNAL MEDICINE

## 2018-10-05 PROCEDURE — P9037 PLATE PHERES LEUKOREDU IRRAD: HCPCS

## 2018-10-05 PROCEDURE — 36430 TRANSFUSION BLD/BLD COMPNT: CPT

## 2018-10-05 PROCEDURE — 63600175 PHARM REV CODE 636 W HCPCS: Performed by: INTERNAL MEDICINE

## 2018-10-05 PROCEDURE — 96374 THER/PROPH/DIAG INJ IV PUSH: CPT

## 2018-10-05 RX ORDER — HYDROCODONE BITARTRATE AND ACETAMINOPHEN 500; 5 MG/1; MG/1
TABLET ORAL ONCE
Status: CANCELLED | OUTPATIENT
Start: 2018-10-05 | End: 2018-10-05

## 2018-10-05 RX ORDER — ACETAMINOPHEN 325 MG/1
650 TABLET ORAL
Status: COMPLETED | OUTPATIENT
Start: 2018-10-05 | End: 2018-10-05

## 2018-10-05 RX ORDER — DIPHENHYDRAMINE HCL 25 MG
25 CAPSULE ORAL
Status: COMPLETED | OUTPATIENT
Start: 2018-10-05 | End: 2018-10-05

## 2018-10-05 RX ORDER — ACETAMINOPHEN 325 MG/1
650 TABLET ORAL
Status: CANCELLED | OUTPATIENT
Start: 2018-10-05

## 2018-10-05 RX ORDER — DIPHENHYDRAMINE HCL 25 MG
25 CAPSULE ORAL
Status: CANCELLED | OUTPATIENT
Start: 2018-10-05

## 2018-10-05 RX ORDER — HYDROCODONE BITARTRATE AND ACETAMINOPHEN 500; 5 MG/1; MG/1
TABLET ORAL ONCE
Status: COMPLETED | OUTPATIENT
Start: 2018-10-05 | End: 2018-10-05

## 2018-10-05 RX ADMIN — ACETAMINOPHEN 650 MG: 325 TABLET ORAL at 02:10

## 2018-10-05 RX ADMIN — SODIUM CHLORIDE: 9 INJECTION, SOLUTION INTRAVENOUS at 02:10

## 2018-10-05 RX ADMIN — DIPHENHYDRAMINE HYDROCHLORIDE 25 MG: 25 CAPSULE ORAL at 02:10

## 2018-10-05 RX ADMIN — HYDROCORTISONE SODIUM SUCCINATE 50 MG: 100 INJECTION, POWDER, FOR SOLUTION INTRAMUSCULAR; INTRAVENOUS at 02:10

## 2018-10-05 NOTE — PLAN OF CARE
Problem: Patient Care Overview  Goal: Plan of Care Review  Outcome: Ongoing (interventions implemented as appropriate)  Pt tolerated 1u plt with no complications. Pt currently receiving first unit of PRBCs with no complications. Second unit to be administered. VSS. NAD. Will continue to monitor. Report given to ASIA Staples RN.

## 2018-10-05 NOTE — TELEPHONE ENCOUNTER
----- Message from Essie Patrick sent at 10/5/2018 10:26 AM CDT -----  Contact: Pt    Pt  asking for nurse to call him ASAP. He would not go into details as to what it is regarding       Pt  call back number [phone]    Patient unable to do the transfusion in [place]. She will have it at [facility] infusion center. Patient on her way here.

## 2018-10-05 NOTE — TELEPHONE ENCOUNTER
----- Message from Essie Patrick sent at 10/5/2018  1:14 PM CDT -----  Contact: Lab  lopez calling to report critical lab value       Lopez call back number [phone]    Dr Matias informed of critical lab value WBC=0.30/ Hgb=6.2/plts=5.     Patient will be transfused 2 units of blood and 1 platelets.

## 2018-10-05 NOTE — PLAN OF CARE
Problem: Patient Care Overview  Goal: Plan of Care Review  Outcome: Ongoing (interventions implemented as appropriate)  Pt. Tolerated transfusions without complication. VSS throughout. Takes BP medication in the evening. PIV removed, catheter tip intact. Discharged with  via wheelchair. No questions at this time.

## 2018-10-05 NOTE — TELEPHONE ENCOUNTER
----- Message from Essie Patrick sent at 10/5/2018  8:17 AM CDT -----  Contact: Pt  Jesi  Pt  calling regarding blood work orders         Jesi call back number [phone]      Spoke with . Orders for blood and platelet transfusion faxed to [place] at [phone]. Patient's  notified. Patient is going to the ER in [place].

## 2018-10-09 ENCOUNTER — OFFICE VISIT (OUTPATIENT)
Dept: HEMATOLOGY/ONCOLOGY | Facility: CLINIC | Age: 68
End: 2018-10-09
Payer: MEDICARE

## 2018-10-09 VITALS
SYSTOLIC BLOOD PRESSURE: 124 MMHG | RESPIRATION RATE: 18 BRPM | HEART RATE: 94 BPM | BODY MASS INDEX: 25.42 KG/M2 | DIASTOLIC BLOOD PRESSURE: 76 MMHG | WEIGHT: 143.5 LBS | TEMPERATURE: 98 F | OXYGEN SATURATION: 99 %

## 2018-10-09 DIAGNOSIS — T45.1X5A CHEMOTHERAPY INDUCED CARDIOMYOPATHY: ICD-10-CM

## 2018-10-09 DIAGNOSIS — C92.01 ACUTE MYELOID LEUKEMIA IN REMISSION: ICD-10-CM

## 2018-10-09 DIAGNOSIS — Z86.19 HISTORY OF CLOSTRIDIUM DIFFICILE INFECTION: ICD-10-CM

## 2018-10-09 DIAGNOSIS — D64.81 ANTINEOPLASTIC CHEMOTHERAPY INDUCED ANEMIA: Primary | ICD-10-CM

## 2018-10-09 DIAGNOSIS — I42.7 CHEMOTHERAPY INDUCED CARDIOMYOPATHY: ICD-10-CM

## 2018-10-09 DIAGNOSIS — T45.1X5A ANTINEOPLASTIC CHEMOTHERAPY INDUCED ANEMIA: Primary | ICD-10-CM

## 2018-10-09 DIAGNOSIS — R79.89 ELEVATED LFTS: ICD-10-CM

## 2018-10-09 PROCEDURE — 99215 OFFICE O/P EST HI 40 MIN: CPT | Mod: S$PBB,,, | Performed by: INTERNAL MEDICINE

## 2018-10-09 PROCEDURE — 99214 OFFICE O/P EST MOD 30 MIN: CPT | Mod: PBBFAC | Performed by: INTERNAL MEDICINE

## 2018-10-09 PROCEDURE — 99999 PR PBB SHADOW E&M-EST. PATIENT-LVL IV: CPT | Mod: PBBFAC,,, | Performed by: INTERNAL MEDICINE

## 2018-10-09 NOTE — Clinical Note
1. Elective admit to bmt on 10/16 [yan on service]2. See me on 10/16 at 11:30am with cbc,cmp [okay to double book, please block off 9am spot to accommodate]

## 2018-10-09 NOTE — PROGRESS NOTES
Hematology and Medical Oncology   Follow Up Note     10/9/2018    Primary Oncologic Diagnosis: AML, FLT 3 + and NPMN1+.   History of Present Ilness:   Sabrina Badillo) is a pleasant 68 y.o.female presents to clinic following 2 induction therapies for AML. She was in the hospital roughly 50 days to receive 7+3 and then FLAG JENNIFER.    Oncology History:   68 y.o. female admitted overnight for possible new AML. Came in from OSH with elevated WBC of 100.   05/25/2018: Pt is now on hydrea 2 grams BID, allopurinol 300 mg daily, and IVF. Pt may need rasburicase this weekend. She will undergo a BM bx and aspiration today. She is an induction candidate and will not be screened for research trials. She has anxiety for which she usually takes xanax, this was re-started.   05/26/2018 PICC placed. Reports she slept well last night. Anxiety improved with prn xanax. EF 65%, HIV and Hep panel negative. 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  6/12/2018: Day 15 of 7+3 induction, restaging BM bx done yesterday. On Midostaurin. Fever yesterday and BC with gram + cocci in clusters. On cefepime day 2 and will start Vanc today. Labial mucositis improving.   6/13/2018: Day 16 7+3. BC with staph aureus, identification pending. Surveillance blood cultures done today. Afebrile x 48 hours. On cefepime and Vanc. Labial mucositis resolved. No complaints of pain. Nausea controlled. No diarrhea. Primary complaint is fatigue.   6/14/20018: Day 17 of 7+3. Day 14 bone marrow results pending. BC with staph epidermis only sensitive to Vancomycin. Vanc day 3 and cefepime day 4. Vanc trough 12.2 last night. BP remains low but stable. Afebrile x 72 hours.   6/15/2018: Day 18 of 7+3. Day 14 bone marrow biopsy shows persistent disease with 15% blasts. Discussion with patient regarding treatment and she is deciding if she wants to proceed with re-induction vs outpatient maintenance. Temp of 100.4 overnight, unsustained. Day 5 Cefepime and Day 4 of  Vanc for staph epidermis. Repeat cultures NGTD. BP improved. Electrolytes (mag, phos, K and calcium) replaced aggressively this am and will repeat labs this afternoon. No complaints this am.   06/16/2018: Day 19 of 7+3. Day 2 of Flag-JENNIFER. Remains afebrile. Calcium remains low and have increased PO supplementation. Remains on vanc and aztreonam. Somewhat loopy feeling after receiving benadryl.   06/17/2018: Day 20 of 7+3. Day 3 of FLAG-JENNIFER. Multiple electrolyte abnormalities. New bilateral leg and feet swelling.   6/18/2018: Day 21 of 7+3 and Day 4 of FLAG JENNIFER. Tolerating well with some nausea controlled with Zofran. VSS, afebrile. ANC 1900 on Neupogen. Continues Vanc for staph epi bacteremia. Multiple electrolytes replaced today. Complains of edema to lower extremities, not improved after 20 of lasix yesterday. No sob or CP.   6/19/18: Day 22 of 7+3 and Day 5 of FLAG JENNIFER. VSS, afebrile, ANC 3900. Vanc trough elevated and dose adjusted this am. Lower extremity edema improved after 40 of lasix yesterday, net negative 300 cc I&O. Mild nausea, no abdominal pain or diarrhea. Poor appetite but no difficulty eating or taking pills.   6/20/18: Day 23 of 7+3 and Day 6 of FLAG JENNIFER. Patient complains of nausea and vomiting overnight and this am, especially when taking pills. Will add Zyprexa daily in addition to Zofran, compazine, and ativan PRN and will change meds to IV. 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 Afebrile.  No diarrhea, mouth sores or pain.  06/21/2018: Day 24 of 7+3 and Day 7 of FLAG JENNIFER. Nausea better controlled. Continued on Vanc.  6/22/2018: Day 25 of 7+3 and Day 8 of FLAG JENNIFER. Nausea improved some with Zyprexa but did have 1 episode of emesis overnight.continuing meds IV due to vomiting. Remains afebrile. ANC 0. Continues Vanc and Flagyl. Electrolytes replaced.   06/23/2018 : day 26 of 7+3 and Day 9 of FLAG JENNIFER.  Nausea persists,  worsened after taking pills so meds converted to IV.  Encouraged ambulation.  Continue with flagyl for leptotrichia goodfellowii.  RUQ US showed intrahepatic dilation, overall impression hepatic steatosis.  CBD 0.5cm.  No Gallbladder.    06/24/2018 : day 27 of 7+3 and Day 10 of FLAG JENNIFER.  Nausea persisting, able to tolerate some po pills.  Last day of flagyl (day 5).  Still pancytopenic. Appetite still low as po intake triggers nausea.  06/25/2018: day 28 of 7+3 and Day 11 of FLAG JENNIFER. Afebrile, ANC 0. Has completed Flagyl. Will decrease Vanc to 1000 mg q12, trough 19.9, will complete Vanc on 6/27. Liver enzymes stable on Midostaurin. VSS.   6/26/2018: day 29 of 7+3 and day 12 of FLAG JENNIFER. Liver enzymes improved and Midostaurin increased to full dose 50 mg bid. Nausea controlled, afebrile, VSS, NEON.  6/27/2018: day 30 of 7+3 and Day 13 of FLAG JENNIFER. Persistent nausea and emesis x 1 yesterday. Compazine changed to scheduled. Vanc ends today. Afebrile, VSS. Aggressive electrolyte replacement, low electrolytes possibly due to Midostaurin.   6/28/2018: day 31 of 7+3 and Day 14 of FLAG JENNIFER. Nausea improved with scheduled Compazine. Patient has agreed to start converting some IV meds to po. VSS, afebrile, electrolytes wnl today. Only complains of fatigue, new rash noted to upper back and chest and legs, papular rash mildly red.  6/29/2018: Day 32 of 7+3 and Day 15 of FLAG JENNIFER. Day 14 restaging bone marrow biopsy done at bedside today. Patient states nausea improved but she did have emesis last night after taking 9 pm pills. Rash improved. No mouth sores, diarrhea or pain.   7/2/2018: Day 35 of 7+3 and Day 18 of FLAG JENNIFER. Restaging BM bx results pending. Fluid overload over the weekend. Chest x-ray with pulmonary edema. Os sats down to 88%. Placed on O2 at 2 L NC, off O2 this am. Received lasix. Net negative 2.7 L today. 1 episode of nausea, no emesis. Reports anxiety relieved with PRN meds. Electrolytes improved,  afebrile, VSS.   7/3/2018: Day 36 of 7+3 and Day 19 of FLAG JENNIFER. Restaging Bm bx with GABRIEL. Cardiology consulted for abnormal echo, EF 30% with pulmonary HTN and severe L atrial enlargement. EKG with T wave abnormality, possible anterolateral ischemia and prolonged QTc of 530. Medications adjusted. Nausea controlled, no diarrhea. Electrolytes improved. On O2 as needed.   07/04/2018 : Day 37 of 7+3 and Day 20 of FLAG JENNIFER Diarrhea in AM, watery, no associated abdominal pain, nausea, or vomiting.    07/05/2018: Day 38 of 7+3 ane Day 21 of FLAG JENNIFER. No CP or SOB, cardiology following. On RA. All meds changed to po, denies nausea or vomiting and no diarrhea. VSS, afebrile.   7/6/2018: Day 22 of FLAG JENNIFER. Continues to tolerate po meds with no nausea. Afebrile. Awaiting count recovery for discharge.  7/7/2018-/8/2018: Day 40/41 of 7+3, Day 23/24 of FLAG JENNIFER.  Improving clinically.  Started on mag oxide per patient request.  Labs showing signs of ANC recovery.  7/9/2018: Day 25 FLAG JENNIFER, , continues Neupogen. Received platelets today. Afebrile, VSS. Tolerating all oral meds with no nausea or vomiting. Only complains of fatigue.   7/10/2018: Day 26 FLAG JENNIFER,  today. Had 2 episodes of vomiting and diarrhea last night. Feeling better. Afebrile, VSS.   7/11/2018: Day 27 FLAG JENNIFER,  today. No vomiting or diarrhea overnight and no nausea. Afebrile, VSS. Complains of back pain (bone pain) suspect due to count recovery. Relieved with oxy IR and Zyrtec started.  07/12/2018: Day 28 flag jennifer, engrafted today with ANC of 730. Back pain improved with zyrtec. Scheduled for IT chemo tomorrow at 1:30 pm and discharge home thereafter. Will likely need plt transfusion prior to IT chemo tomorrow   7/13/2018: Day 29 of FLAG JENNIFER. ANC up to 1300 today. Transfusing platelets today prior to LP for IT chemo. Patient continues to complain of bone pain, mostly to back and legs. No nausea, diarrhea, sob. Afebrile and VSS.    --hospitalized 7/23-7/25 for C.diff diarrhea, neutropenic fever.  While inpatient she developed pink blisters on her palm that were concerning for relapse.  --palm biopsy on 7/26/18 for suspicion of leukemia cutis was also negative for sign of relapse  --bone marrow biopsy 7/31/18 was negative for signs of relapse   --Admitted on 8/14/18 for HiDAC#1  --Admitted on 9/11/18 for HiDAC#2    Interval History:   Ms. Mac continues to do well at home. Following hidac #2 her energy and spirits have been good. Her appetite has decreased in recent days, but overall she is doing well. She is okay with a 1 week delay in counts due to low numbers. She greatly enjoyed her time with the [employer] last weekend.    Past Medical History:   Past Medical History:   Diagnosis Date    Cancer 03/2018    AML    CHF (congestive heart failure)     Diarrhea 9/14/2018    Encounter for blood transfusion        Current Medications:   Current Outpatient Medications   Medication Sig    acyclovir (ZOVIRAX) 200 MG capsule Take 2 capsules (400 mg total) by mouth 2 (two) times daily.    ALPRAZolam (XANAX) 0.25 MG tablet Take 1 tablet (0.25 mg total) by mouth 3 (three) times daily as needed for Anxiety.    ciprofloxacin HCl (CIPRO) 500 MG tablet Take 1 tablet (500 mg total) by mouth every 12 (twelve) hours.    dexamethasone (DECADRON) 0.1 % ophthalmic solution Place 1 drop into both eyes every 6 (six) hours. Through 9/19/18.    escitalopram oxalate (LEXAPRO) 10 MG tablet Take 10 mg by mouth once daily.    fluconazole (DIFLUCAN) 200 MG Tab Take 2 tablets (400 mg total) by mouth once daily.    LORazepam (ATIVAN) 0.5 MG tablet Take 1 tablet (0.5 mg total) by mouth every 6 (six) hours as needed (nausea).    magnesium oxide (MAG-OX) 400 mg tablet Take 2 tablets (800 mg total) by mouth 2 (two) times daily.    metoprolol succinate (TOPROL-XL) 25 MG 24 hr tablet Take 0.5 tablets (12.5 mg total) by mouth once daily.    midostaurin (RYDAPT) 25  mg Cap Take 50 mg by mouth 2 (two) times daily. Take only if instructed by Dr. Troy to restart.    nystatin (DUKE'S SOLUTION) Take 10 mLs by mouth 4 (four) times daily.    omeprazole (PRILOSEC OTC) 20 MG tablet Take 20 mg by mouth every morning.    ondansetron (ZOFRAN) 8 MG tablet Take 1 tablet (8 mg total) by mouth every 12 (twelve) hours as needed for Nausea.    prochlorperazine (COMPAZINE) 10 MG tablet Take 1 tablet (10 mg total) by mouth 4 (four) times daily.    vitamin D 1000 units Tab Take 1 tablet (1,000 Units total) by mouth once daily.     No current facility-administered medications for this visit.      ALLERGIES:   Review of patient's allergies indicates:   Allergen Reactions    Methotrexate analogues      Elevated Liver Enzyme    Bactrim [sulfamethoxazole-trimethoprim] Nausea And Vomiting and Rash       Review of Systems:     Review of Systems   Constitutional: Positive for appetite change and fatigue. Negative for chills, diaphoresis, fever and unexpected weight change.   HENT:   Negative for hearing loss, mouth sores, nosebleeds, sore throat, trouble swallowing and voice change.    Eyes: Negative for eye problems and icterus.   Respiratory: Negative for chest tightness, cough, hemoptysis, shortness of breath and wheezing.    Cardiovascular: Negative for chest pain, leg swelling and palpitations.   Gastrointestinal: Negative for abdominal distention, abdominal pain, blood in stool, diarrhea, nausea and vomiting.   Endocrine: Negative for hot flashes.   Genitourinary: Negative for bladder incontinence, difficulty urinating, dysuria and hematuria.    Musculoskeletal: Negative for arthralgias, back pain, flank pain, gait problem, myalgias, neck pain and neck stiffness.   Skin: Negative for itching, rash and wound.   Neurological: Negative for dizziness, extremity weakness, gait problem, headaches, numbness, seizures and speech difficulty.   Hematological: Negative for adenopathy. Bruises/bleeds  easily.   Psychiatric/Behavioral: Positive for sleep disturbance. Negative for confusion and depression. The patient is nervous/anxious.         Physical Exam:     Vitals:    10/09/18 1605   BP: 124/76   Pulse: 94   Resp: 18   Temp: 98.4 °F (36.9 °C)       Physical Exam   Constitutional: She is oriented to person, place, and time. She appears well-developed and well-nourished. No distress.   HENT:   Head: Normocephalic and atraumatic.   Mouth/Throat: Oropharynx is clear and moist. No oropharyngeal exudate.   No hair, wearing a cap     Eyes: Conjunctivae and EOM are normal. Pupils are equal, round, and reactive to light.   Neck: Normal range of motion. Neck supple. No JVD present. No tracheal deviation present. No thyromegaly present.   Cardiovascular: Normal rate, regular rhythm and normal heart sounds. Exam reveals no friction rub.   No murmur heard.  Pulmonary/Chest: Effort normal and breath sounds normal. No stridor. No respiratory distress. She has no wheezes. She has no rales. She exhibits no tenderness.   Abdominal: Soft. Bowel sounds are normal. She exhibits no distension. There is no tenderness. There is no rebound and no guarding.   Musculoskeletal: Normal range of motion. She exhibits no edema, tenderness or deformity.   Lymphadenopathy:     She has no axillary adenopathy.   Neurological: She is alert and oriented to person, place, and time. She displays normal reflexes. No cranial nerve deficit or sensory deficit. She exhibits normal muscle tone. Coordination normal.   Skin: Skin is warm and dry. Capillary refill takes less than 2 seconds. No rash noted. She is not diaphoretic. No erythema. No pallor.   Numerous lower extremity petechie   Psychiatric: She has a normal mood and affect. Her behavior is normal. Judgment and thought content normal.       ECOG Performance Status: (foot note - ECOG PS provided by Eastern Cooperative Oncology Group) 2 - Symptomatic, <50% confined to bed    Karnofsky Performance  Score:  70%- Cares for Self: Unable to Carry on Normal Activity or Active Work    Labs:   Lab Results   Component Value Date    WBC 0.87 (LL) 10/09/2018    HGB 9.1 (L) 10/09/2018    HCT 27.2 (L) 10/09/2018    PLT 31 (LL) 10/09/2018    ALT 18 10/09/2018    AST 16 10/09/2018     10/09/2018    K 4.0 10/09/2018     10/09/2018    CREATININE 0.8 10/09/2018    BUN 20 10/09/2018    CO2 22 (L) 10/09/2018    INR 1.1 05/31/2018       Imaging: previous imaging has been reviewed    Assessment and Plan:     Mrs. Mac is a pleasant 68 year old female with recently diagnosed AML.      Acute Monocytic Leukemia  -- Admitted from [facility]18 early AM with WBC around 100s, for suspected new non-M3 AML  --lo res HLA typing on 5/26/18 and hi res on 5/27/18 done in anticipation of possible need for future transplant   --Pt with two daughters, two full sisters (in their mid 60s, one with brain aneurysm hx, other one without any medical issues), and one full brother (59 y/o healthy).  --NPM1 +, CEBPA (-), FLT3 (+).  On Midostaurin 50 mg PO BID until Day 14 (held starting 6/8 to 6/11). Stopped when FLAG JENNIFER re-induction started. Restarted Midostaurin at 25 mg bid on day 8 of FLAG JENNIFER induction (6/22), increase to full dose 50 mg bid (6/26) as improvement in liver enzymes. Stopped 7/3/18 due to abnormal cardiac echo.  --day 14 bone marrow biopsy completed 6/11 showing persistent disease with 15% CD 34 positive blasts  --Day 60 of FLAG-JENNIFER, tolerating without difficulty except nausea/vomiting  --day 14 restaging bone marrow biopsy done 6/29 without complication, results with no evidence of AML, FLT 3 negative, will need repeat marrow at count recovery outpatient   --recovery marrow showed no evidence of disease  --HiDAC #1 on 8/14/18, HiDAC#2 on 9/11/18  --Cycle 3 will be delayed 1 week due to pancytopenia. Now planned for 10/16/18    Chemotherapy related anemia/thromboctopenia   ----Between consolidation therapies  mon/thurs labs locally with the reports sent to me  --We were able to transfuse blood locally      Suspected Leukemia Cutis  --pathology from dermatology confirmed inflammatory changes  --now resolved with topical steroid cream    Abnormal Liver Function Tests  --ALT and AST have now normalized. Alk phos 131 today.   --midostaurin started day 8 at 25 mg bid, monitoring liver enzymes closely and improved. Increasing Midostaurin to 50 mg bid 6/26. Stopped Midostaurin (7/3) due to new diagnosis of systolic heart failure EF 35%.  --6/22/18 liver US showing mild intrahepatic dilation, otherwise impression of hepatic steatosis.  Will hold off of MRCP at this time.    --continue to monitor alk phos    Chemotherapy induced cardiomyopathy  --cardiology follow-up as outpatient in 3 months  --repeat 2D echo on 6/15 with preserved EF of 60%.  However 7/2/18 echo with reduced EF 30-35%  --Echo on 8/15/18 was within normal range EF of 60-65%    30 minutes were spent face to face with the patient and her  family to discuss the disease, natural history, treatment options and survival statistics. I have provided the patient with an opportunity to ask questions and have all questions answered to her satisfaction.       she will return to clinic in 1 week in preporation for admission, but knows to call in the interim if symptoms change or should a problem arise.        Laquita Troy MD  Hematology and Medical Oncology  Bone Marrow Transplant  [facility]

## 2018-10-11 ENCOUNTER — DOCUMENTATION ONLY (OUTPATIENT)
Dept: HEMATOLOGY/ONCOLOGY | Facility: CLINIC | Age: 68
End: 2018-10-11

## 2018-10-16 ENCOUNTER — OFFICE VISIT (OUTPATIENT)
Dept: HEMATOLOGY/ONCOLOGY | Facility: CLINIC | Age: 68
DRG: 837 | End: 2018-10-16
Payer: MEDICARE

## 2018-10-16 ENCOUNTER — HOSPITAL ENCOUNTER (INPATIENT)
Facility: HOSPITAL | Age: 68
LOS: 5 days | Discharge: HOME | DRG: 837 | End: 2018-10-21
Attending: INTERNAL MEDICINE | Admitting: INTERNAL MEDICINE
Payer: MEDICARE

## 2018-10-16 VITALS
DIASTOLIC BLOOD PRESSURE: 58 MMHG | WEIGHT: 143.31 LBS | SYSTOLIC BLOOD PRESSURE: 125 MMHG | OXYGEN SATURATION: 100 % | HEART RATE: 86 BPM | RESPIRATION RATE: 16 BRPM | TEMPERATURE: 99 F | HEIGHT: 63 IN | BODY MASS INDEX: 25.39 KG/M2

## 2018-10-16 DIAGNOSIS — R79.89 ELEVATED LFTS: ICD-10-CM

## 2018-10-16 DIAGNOSIS — C92.00 AML (ACUTE MYELOBLASTIC LEUKEMIA): ICD-10-CM

## 2018-10-16 DIAGNOSIS — Z86.19 HISTORY OF CLOSTRIDIUM DIFFICILE INFECTION: ICD-10-CM

## 2018-10-16 DIAGNOSIS — C93.01 ACUTE MONOCYTIC LEUKEMIA IN REMISSION: Primary | ICD-10-CM

## 2018-10-16 DIAGNOSIS — C92.01 ACUTE MYELOID LEUKEMIA IN REMISSION: Primary | ICD-10-CM

## 2018-10-16 DIAGNOSIS — G89.29 OTHER CHRONIC PAIN: ICD-10-CM

## 2018-10-16 DIAGNOSIS — C92.00 ACUTE MYELOID LEUKEMIA NOT HAVING ACHIEVED REMISSION: ICD-10-CM

## 2018-10-16 DIAGNOSIS — T45.1X5A CINV (CHEMOTHERAPY-INDUCED NAUSEA AND VOMITING): ICD-10-CM

## 2018-10-16 DIAGNOSIS — R11.2 CINV (CHEMOTHERAPY-INDUCED NAUSEA AND VOMITING): ICD-10-CM

## 2018-10-16 PROBLEM — R21 RASH OF HANDS: Status: RESOLVED | Noted: 2018-07-31 | Resolved: 2018-10-16

## 2018-10-16 PROBLEM — R19.7 DIARRHEA: Status: RESOLVED | Noted: 2018-09-14 | Resolved: 2018-10-16

## 2018-10-16 PROBLEM — K12.31 MUCOSITIS DUE TO CHEMOTHERAPY: Status: ACTIVE | Noted: 2018-10-16

## 2018-10-16 PROBLEM — D64.81 ANTINEOPLASTIC CHEMOTHERAPY INDUCED ANEMIA: Status: RESOLVED | Noted: 2018-09-26 | Resolved: 2018-10-16

## 2018-10-16 PROBLEM — R74.8 ABNORMAL LIVER ENZYMES: Status: RESOLVED | Noted: 2018-06-13 | Resolved: 2018-10-16

## 2018-10-16 PROBLEM — F41.8 DEPRESSION WITH ANXIETY: Status: ACTIVE | Noted: 2018-05-25

## 2018-10-16 PROBLEM — Z86.79 HISTORY OF VENTRICULAR FIBRILLATION: Status: ACTIVE | Noted: 2018-10-16

## 2018-10-16 PROBLEM — E43 SEVERE MALNUTRITION: Status: RESOLVED | Noted: 2018-07-05 | Resolved: 2018-10-16

## 2018-10-16 PROCEDURE — 63600175 PHARM REV CODE 636 W HCPCS: Performed by: INTERNAL MEDICINE

## 2018-10-16 PROCEDURE — 25000003 PHARM REV CODE 250: Performed by: NURSE PRACTITIONER

## 2018-10-16 PROCEDURE — 25000003 PHARM REV CODE 250: Performed by: INTERNAL MEDICINE

## 2018-10-16 PROCEDURE — XW033B3 INTRODUCTION OF CYTARABINE AND DAUNORUBICIN LIPOSOME ANTINEOPLASTIC INTO PERIPHERAL VEIN, PERCUTANEOUS APPROACH, NEW TECHNOLOGY GROUP 3: ICD-10-PCS | Performed by: INTERNAL MEDICINE

## 2018-10-16 PROCEDURE — 99233 SBSQ HOSP IP/OBS HIGH 50: CPT | Mod: ,,, | Performed by: INTERNAL MEDICINE

## 2018-10-16 PROCEDURE — 99999 PR PBB SHADOW E&M-EST. PATIENT-LVL IV: CPT | Mod: PBBFAC,,, | Performed by: INTERNAL MEDICINE

## 2018-10-16 PROCEDURE — 63600175 PHARM REV CODE 636 W HCPCS: Performed by: NURSE PRACTITIONER

## 2018-10-16 PROCEDURE — 20600001 HC STEP DOWN PRIVATE ROOM

## 2018-10-16 PROCEDURE — 99214 OFFICE O/P EST MOD 30 MIN: CPT | Mod: PBBFAC | Performed by: INTERNAL MEDICINE

## 2018-10-16 RX ORDER — SODIUM CHLORIDE 0.9 % (FLUSH) 0.9 %
5 SYRINGE (ML) INJECTION
Status: DISCONTINUED | OUTPATIENT
Start: 2018-10-16 | End: 2018-10-21 | Stop reason: HOSPADM

## 2018-10-16 RX ORDER — DEXAMETHASONE SODIUM PHOSPHATE 1 MG/ML
1 SOLUTION/ DROPS OPHTHALMIC EVERY 6 HOURS
Status: CANCELLED | OUTPATIENT
Start: 2018-10-16

## 2018-10-16 RX ORDER — SODIUM CHLORIDE 9 MG/ML
INJECTION, SOLUTION INTRAVENOUS CONTINUOUS
Status: CANCELLED | OUTPATIENT
Start: 2018-10-16

## 2018-10-16 RX ORDER — SODIUM CHLORIDE 9 MG/ML
INJECTION, SOLUTION INTRAVENOUS CONTINUOUS
Status: DISCONTINUED | OUTPATIENT
Start: 2018-10-16 | End: 2018-10-21 | Stop reason: HOSPADM

## 2018-10-16 RX ORDER — ONDANSETRON 4 MG/1
8 TABLET, FILM COATED ORAL
Status: COMPLETED | OUTPATIENT
Start: 2018-10-18 | End: 2018-10-19

## 2018-10-16 RX ORDER — LANOLIN ALCOHOL/MO/W.PET/CERES
400 CREAM (GRAM) TOPICAL EVERY 4 HOURS PRN
Status: DISCONTINUED | OUTPATIENT
Start: 2018-10-16 | End: 2018-10-21 | Stop reason: HOSPADM

## 2018-10-16 RX ORDER — DEXAMETHASONE 4 MG/1
8 TABLET ORAL
Status: COMPLETED | OUTPATIENT
Start: 2018-10-18 | End: 2018-10-19

## 2018-10-16 RX ORDER — PANTOPRAZOLE SODIUM 40 MG/1
40 TABLET, DELAYED RELEASE ORAL DAILY
Status: DISCONTINUED | OUTPATIENT
Start: 2018-10-16 | End: 2018-10-21 | Stop reason: HOSPADM

## 2018-10-16 RX ORDER — ONDANSETRON 4 MG/1
8 TABLET, FILM COATED ORAL
Status: COMPLETED | OUTPATIENT
Start: 2018-10-16 | End: 2018-10-17

## 2018-10-16 RX ORDER — ACYCLOVIR 200 MG/1
400 CAPSULE ORAL 2 TIMES DAILY
Status: DISCONTINUED | OUTPATIENT
Start: 2018-10-16 | End: 2018-10-21 | Stop reason: HOSPADM

## 2018-10-16 RX ORDER — DEXAMETHASONE 0.5 MG/1
8 TABLET ORAL
Status: CANCELLED | OUTPATIENT
Start: 2018-10-16

## 2018-10-16 RX ORDER — ENOXAPARIN SODIUM 100 MG/ML
40 INJECTION SUBCUTANEOUS EVERY 24 HOURS
Status: DISCONTINUED | OUTPATIENT
Start: 2018-10-16 | End: 2018-10-21 | Stop reason: HOSPADM

## 2018-10-16 RX ORDER — ESCITALOPRAM OXALATE 10 MG/1
10 TABLET ORAL DAILY
Status: DISCONTINUED | OUTPATIENT
Start: 2018-10-16 | End: 2018-10-21 | Stop reason: HOSPADM

## 2018-10-16 RX ORDER — SODIUM CHLORIDE 0.9 % (FLUSH) 0.9 %
10 SYRINGE (ML) INJECTION
Status: DISCONTINUED | OUTPATIENT
Start: 2018-10-16 | End: 2018-10-21 | Stop reason: HOSPADM

## 2018-10-16 RX ORDER — SODIUM CHLORIDE 0.9 % (FLUSH) 0.9 %
10 SYRINGE (ML) INJECTION
Status: CANCELLED | OUTPATIENT
Start: 2018-10-16

## 2018-10-16 RX ORDER — DEXAMETHASONE 0.5 MG/1
8 TABLET ORAL
Status: CANCELLED | OUTPATIENT
Start: 2018-10-20

## 2018-10-16 RX ORDER — HEPARIN 100 UNIT/ML
300 SYRINGE INTRAVENOUS
Status: CANCELLED | OUTPATIENT
Start: 2018-10-16

## 2018-10-16 RX ORDER — DEXAMETHASONE 4 MG/1
8 TABLET ORAL
Status: COMPLETED | OUTPATIENT
Start: 2018-10-20 | End: 2018-10-21

## 2018-10-16 RX ORDER — LANOLIN ALCOHOL/MO/W.PET/CERES
800 CREAM (GRAM) TOPICAL EVERY 4 HOURS PRN
Status: DISCONTINUED | OUTPATIENT
Start: 2018-10-16 | End: 2018-10-21 | Stop reason: HOSPADM

## 2018-10-16 RX ORDER — DEXAMETHASONE 4 MG/1
8 TABLET ORAL
Status: COMPLETED | OUTPATIENT
Start: 2018-10-16 | End: 2018-10-17

## 2018-10-16 RX ORDER — POTASSIUM CHLORIDE 750 MG/1
20 CAPSULE, EXTENDED RELEASE ORAL
Status: DISCONTINUED | OUTPATIENT
Start: 2018-10-16 | End: 2018-10-21 | Stop reason: HOSPADM

## 2018-10-16 RX ORDER — DEXAMETHASONE SODIUM PHOSPHATE 1 MG/ML
1 SOLUTION/ DROPS OPHTHALMIC EVERY 6 HOURS
Status: DISCONTINUED | OUTPATIENT
Start: 2018-10-16 | End: 2018-10-21 | Stop reason: HOSPADM

## 2018-10-16 RX ORDER — ONDANSETRON 4 MG/1
8 TABLET, FILM COATED ORAL
Status: CANCELLED | OUTPATIENT
Start: 2018-10-20

## 2018-10-16 RX ORDER — ALPRAZOLAM 0.25 MG/1
0.25 TABLET ORAL 3 TIMES DAILY PRN
Status: DISCONTINUED | OUTPATIENT
Start: 2018-10-16 | End: 2018-10-21 | Stop reason: HOSPADM

## 2018-10-16 RX ORDER — ONDANSETRON 4 MG/1
8 TABLET, FILM COATED ORAL
Status: COMPLETED | OUTPATIENT
Start: 2018-10-20 | End: 2018-10-21

## 2018-10-16 RX ORDER — LORAZEPAM 0.5 MG/1
0.5 TABLET ORAL EVERY 6 HOURS PRN
Status: DISCONTINUED | OUTPATIENT
Start: 2018-10-16 | End: 2018-10-21 | Stop reason: HOSPADM

## 2018-10-16 RX ORDER — ONDANSETRON 4 MG/1
8 TABLET, FILM COATED ORAL
Status: CANCELLED | OUTPATIENT
Start: 2018-10-16

## 2018-10-16 RX ORDER — ONDANSETRON 4 MG/1
8 TABLET, FILM COATED ORAL EVERY 12 HOURS PRN
Status: DISCONTINUED | OUTPATIENT
Start: 2018-10-16 | End: 2018-10-21 | Stop reason: HOSPADM

## 2018-10-16 RX ORDER — DEXAMETHASONE 0.5 MG/1
8 TABLET ORAL
Status: CANCELLED | OUTPATIENT
Start: 2018-10-18

## 2018-10-16 RX ORDER — ONDANSETRON 4 MG/1
8 TABLET, FILM COATED ORAL
Status: CANCELLED | OUTPATIENT
Start: 2018-10-18

## 2018-10-16 RX ORDER — PROCHLORPERAZINE MALEATE 5 MG
10 TABLET ORAL 4 TIMES DAILY
Status: DISCONTINUED | OUTPATIENT
Start: 2018-10-16 | End: 2018-10-17

## 2018-10-16 RX ORDER — SODIUM,POTASSIUM PHOSPHATES 280-250MG
1 POWDER IN PACKET (EA) ORAL EVERY 4 HOURS PRN
Status: DISCONTINUED | OUTPATIENT
Start: 2018-10-16 | End: 2018-10-21 | Stop reason: HOSPADM

## 2018-10-16 RX ORDER — HEPARIN 100 UNIT/ML
300 SYRINGE INTRAVENOUS
Status: DISCONTINUED | OUTPATIENT
Start: 2018-10-16 | End: 2018-10-21 | Stop reason: HOSPADM

## 2018-10-16 RX ORDER — SODIUM,POTASSIUM PHOSPHATES 280-250MG
2 POWDER IN PACKET (EA) ORAL EVERY 4 HOURS PRN
Status: DISCONTINUED | OUTPATIENT
Start: 2018-10-16 | End: 2018-10-21 | Stop reason: HOSPADM

## 2018-10-16 RX ADMIN — Medication 10 ML: at 04:10

## 2018-10-16 RX ADMIN — DEXAMETHASONE 8 MG: 4 TABLET ORAL at 08:10

## 2018-10-16 RX ADMIN — PROCHLORPERAZINE MALEATE 10 MG: 5 TABLET, FILM COATED ORAL at 04:10

## 2018-10-16 RX ADMIN — PANTOPRAZOLE SODIUM 40 MG: 40 TABLET, DELAYED RELEASE ORAL at 02:10

## 2018-10-16 RX ADMIN — SODIUM CHLORIDE: 0.9 INJECTION, SOLUTION INTRAVENOUS at 06:10

## 2018-10-16 RX ADMIN — DEXAMETHASONE 1 DROP: 1 SUSPENSION OPHTHALMIC at 06:10

## 2018-10-16 RX ADMIN — ACYCLOVIR 400 MG: 200 CAPSULE ORAL at 08:10

## 2018-10-16 RX ADMIN — METOPROLOL SUCCINATE 12.5 MG: 25 TABLET, EXTENDED RELEASE ORAL at 04:10

## 2018-10-16 RX ADMIN — CYTARABINE 5040 MG: 100 INJECTION, SOLUTION INTRATHECAL; INTRAVENOUS; SUBCUTANEOUS at 09:10

## 2018-10-16 RX ADMIN — PROCHLORPERAZINE MALEATE 10 MG: 5 TABLET, FILM COATED ORAL at 08:10

## 2018-10-16 RX ADMIN — ONDANSETRON 8 MG: 4 TABLET, FILM COATED ORAL at 08:10

## 2018-10-16 RX ADMIN — ESCITALOPRAM OXALATE 10 MG: 10 TABLET ORAL at 02:10

## 2018-10-16 RX ADMIN — ENOXAPARIN SODIUM 40 MG: 100 INJECTION SUBCUTANEOUS at 04:10

## 2018-10-16 NOTE — H&P
[facility]  Hematology  Bone Marrow Transplant  H&P    Subjective:     Principal Problem: <principal problem not specified>    HPI: 68 y.o. female with hx of AML here for cycle #3 of HIDAC consolidation. Cycle #3 was delayed by x1 week due to pancytopenia. Counts are now improved and safe for admission. Pt was seen in clinic by Dr. Troy today without complaints.    Patient information was obtained from patient and past medical records.     Oncology History: per last clinic note by Dr. 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  -- NPM1 +, CEBPA (-), FLT3 (+).  On Midostaurin 50 mg PO BID until Day 14 (held starting 6/8 to 6/11). Stopped when FLAG JENNIFER re-induction started. Restarted Midostaurin at 25 mg bid on day 8 of FLAG JENNIFER induction (6/22), increase to full dose 50 mg bid (6/26) as improvement in liver enzymes. Stopped 7/3/18 due to abnormal cardiac echo.  -- Day 14 bone marrow biopsy completed 6/11 showing persistent disease with 15% CD 34 positive blasts  --Day 60 of FLAG-JENNIFER, tolerating without difficulty except nausea/vomiting  -- Day 14 restaging bone marrow biopsy done 6/29 without complication, results with no evidence of AML, FLT 3 negative, will need repeat marrow at count recovery outpatient   -- Recovery marrow showed no evidence of disease  -- HiDAC #1 on 8/14/18, HiDAC#2 on 9/11/18  -- Cycle 3 delayed 1 week due to pancytopenia. Instead planned for 10/16/18    Medications Prior to Admission   Medication Sig Dispense Refill Last Dose    acyclovir (ZOVIRAX) 200 MG capsule Take 2 capsules (400 mg total) by mouth 2 (two) times  daily. 120 capsule 11 Taking    ALPRAZolam (XANAX) 0.25 MG tablet Take 1 tablet (0.25 mg total) by mouth 3 (three) times daily as needed for Anxiety. 40 tablet 1 Taking    ciprofloxacin HCl (CIPRO) 500 MG tablet Take 1 tablet (500 mg total) by mouth every 12 (twelve) hours. 60 tablet 3 Taking    dexamethasone (DECADRON) 0.1 % ophthalmic solution Place 1 drop into both eyes every 6 (six) hours. Through 9/19/18.   Taking    escitalopram oxalate (LEXAPRO) 10 MG tablet Take 10 mg by mouth once daily.   Taking    fluconazole (DIFLUCAN) 200 MG Tab Take 2 tablets (400 mg total) by mouth once daily. 60 tablet 3 Taking    LORazepam (ATIVAN) 0.5 MG tablet Take 1 tablet (0.5 mg total) by mouth every 6 (six) hours as needed (nausea). 60 tablet 0 Taking    magnesium oxide (MAG-OX) 400 mg tablet Take 2 tablets (800 mg total) by mouth 2 (two) times daily. 120 tablet 1 Taking    metoprolol succinate (TOPROL-XL) 25 MG 24 hr tablet Take 0.5 tablets (12.5 mg total) by mouth once daily. 30 tablet 3 Taking    midostaurin (RYDAPT) 25 mg Cap Take 50 mg by mouth 2 (two) times daily. Take only if instructed by Dr. Troy to restart. 56 capsule 2 Taking    nystatin (DUKE'S SOLUTION) Take 10 mLs by mouth 4 (four) times daily. 120 mL 3 Taking    omeprazole (PRILOSEC OTC) 20 MG tablet Take 20 mg by mouth every morning.   Taking    ondansetron (ZOFRAN) 8 MG tablet Take 1 tablet (8 mg total) by mouth every 12 (twelve) hours as needed for Nausea. 30 tablet 2 Taking    prochlorperazine (COMPAZINE) 10 MG tablet Take 1 tablet (10 mg total) by mouth 4 (four) times daily. 60 tablet 3 Taking    vitamin D 1000 units Tab Take 1 tablet (1,000 Units total) by mouth once daily. 30 tablet 2 Taking     Methotrexate analogues; Sulfa (sulfonamide antibiotics); and Bactrim [sulfamethoxazole-trimethoprim]     Past Medical History:   Diagnosis Date    Cancer 03/2018    AML    CHF (congestive heart failure)     Diarrhea 9/14/2018    Encounter for  blood transfusion      Past Surgical History:   Procedure Laterality Date    CHOLECYSTECTOMY      HYSTERECTOMY      TONSILLECTOMY       Family History     Problem Relation (Age of Onset)    Cancer Mother, Father        Tobacco Use    Smoking status: Former Smoker     Packs/day: 0.50     Years: 10.00     Pack years: 5.00     Types: Cigarettes     Last attempt to quit: 3/25/2017     Years since quittin.5    Smokeless tobacco: Never Used   Substance and Sexual Activity    Alcohol use: Yes     Alcohol/week: 0.6 oz     Types: 1 Shots of liquor per week    Drug use: No    Sexual activity: Yes     Partners: Female     Review of Systems   Constitutional: Positive for fatigue. Negative for appetite change, chills, fever and unexpected weight change.   HENT: Positive for mouth sores (after last chemo, now gone). Negative for hearing loss and tinnitus.    Eyes: Negative for photophobia.   Respiratory: Negative for cough, chest tightness and shortness of breath.    Cardiovascular: Negative for chest pain, palpitations and leg swelling.   Gastrointestinal: Negative for abdominal distention, abdominal pain, blood in stool, constipation, diarrhea, nausea and vomiting.   Genitourinary: Negative for difficulty urinating, dysuria, hematuria and urgency.   Musculoskeletal: Negative for back pain.   Skin: Negative for pallor.   Neurological: Negative for dizziness, syncope, numbness and headaches.   Hematological: Negative for adenopathy. Bruises/bleeds easily.   Psychiatric/Behavioral: Negative for confusion. The patient is nervous/anxious.       Objective:     Vital Signs (Most Recent):    Vital Signs (24h Range):  Temp:  [98.8 °F (37.1 °C)] 98.8 °F (37.1 °C)  Pulse:  [86] 86  Resp:  [16] 16  SpO2:  [100 %] 100 %  BP: (125)/(58) 125/58     Weight: 65 kg (143 lb 4.8 oz)  Body mass index is 25.38 kg/m².  Body surface area is 1.7 meters squared.    Lines/Drains/Airways          None        Physical Exam   Constitutional: She  is oriented to person, place, and time. Vital signs are normal. She is cooperative.   HENT:   Head: Normocephalic.   Mouth/Throat: No oral lesions (none noted).   Eyes: Lids are normal.   Neck: Trachea normal and normal range of motion.   Cardiovascular: Normal rate, regular rhythm, S1 normal, S2 normal and normal heart sounds.   Pulmonary/Chest: Effort normal and breath sounds normal.   Abdominal: Soft. Normal appearance and bowel sounds are normal.   Musculoskeletal: Normal range of motion.   Neurological: She is alert and oriented to person, place, and time. Coordination and gait normal.   Skin: Skin is dry and intact. She is not diaphoretic. No pallor.   Psychiatric: She has a normal mood and affect. Her speech is normal and behavior is normal. Judgment and thought content normal. Cognition and memory are normal.     Significant Labs:   CBC:   Recent Labs   Lab  10/16/18   1011   WBC  2.97*   HGB  8.7*   HCT  26.9*   PLT  80*    and CMP:   Recent Labs   Lab  10/16/18   1012   NA  136   K  4.0   CL  103   CO2  23   GLU  112*   BUN  16   CREATININE  0.8   CALCIUM  10.5   PROT  6.6   ALBUMIN  3.5   BILITOT  0.3   ALKPHOS  138*   AST  20   ALT  15   ANIONGAP  10   EGFRNONAA  >60.0       Diagnostic Results:  I have reviewed all pertinent imaging results/findings within the past 24 hours.    Assessment/Plan:     AML (acute myeloblastic leukemia)    - Admitted from [facility] in past for new AML; NPM1 +, CEBPA (-), FLT3 (+)  - Received 7+3 and then FLAG JENNIFER for induction and 2nd induction due to residual disease. Recovery marrow without disease  - Hand rash thought to be leukemia cutis resolved, skin bx did not show cutis   - On midostaurin off and on due to side effects (elevated LFTs) and abnormal cardiac echo; plan upon discharge is attempting to retrial midostaurin per Dr. Troy  - HIDAC #1 on 8/14/18, HIDAC #2 on 9/11/18, HIDAC #3 delayed due to pancytopenia and now on 10/16/18  - Okay to proceed with  therapy today, with platelet count of 80k, as risk out weighs benefit of further treatment delay per primary Dr. Troy        Pancytopenia due to chemotherapy    - transfuse for hgb <7 and plt <10K  - wbc 2.97 with ANC of 1800, hgb 8.7, plt 80K  - stop ppx lovenox if plt <50K  - d/c on antimicrobial ppx        CINV (chemotherapy-induced nausea and vomiting)    - continue prn antiemetics from home at this time        Mucositis due to chemotherapy    - history of after chemo  - will continue home dukes solution        Chemotherapy induced cardiomyopathy    - post induction, now resolved  - last echo with improved EF        History of ventricular fibrillation    - continue home metoprolol 12.5 mg at 4:30 pm daily per pt request        Electrolyte abnormality    - replace per BMT electrolyte protocol         Depression with anxiety    - continue home lexapro  - continue home xanax and ativan prn            VTE Risk Mitigation (From admission, onward)        Ordered     enoxaparin injection 40 mg  Daily      10/16/18 1403     IP VTE HIGH RISK PATIENT  Once      10/16/18 1403          Disposition: admitted to inpatient BMT unit for chemotherapy; will remain inpatient until chemotherapy is completed.    Norma Berg, EUGENIE  Bone Marrow Transplant  Hematology  [facility]

## 2018-10-16 NOTE — PROGRESS NOTES
Hematology and Medical Oncology   Follow Up Note     10/16/2018    Primary Oncologic Diagnosis: AML, FLT 3 + and NPMN1+.   History of Present Ilness:   Sabrina Badillo) is a pleasant 68 y.o.female presents to clinic following 2 induction therapies for AML. She was in the hospital roughly 50 days to receive 7+3 and then FLAG JENNIFER.    Oncology History:   68 y.o. female admitted overnight for possible new AML. Came in from OSH with elevated WBC of 100.   05/25/2018: Pt is now on hydrea 2 grams BID, allopurinol 300 mg daily, and IVF. Pt may need rasburicase this weekend. She will undergo a BM bx and aspiration today. She is an induction candidate and will not be screened for research trials. She has anxiety for which she usually takes xanax, this was re-started.   05/26/2018 PICC placed. Reports she slept well last night. Anxiety improved with prn xanax. EF 65%, HIV and Hep panel negative. 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  6/12/2018: Day 15 of 7+3 induction, restaging BM bx done yesterday. On Midostaurin. Fever yesterday and BC with gram + cocci in clusters. On cefepime day 2 and will start Vanc today. Labial mucositis improving.   6/13/2018: Day 16 7+3. BC with staph aureus, identification pending. Surveillance blood cultures done today. Afebrile x 48 hours. On cefepime and Vanc. Labial mucositis resolved. No complaints of pain. Nausea controlled. No diarrhea. Primary complaint is fatigue.   6/14/20018: Day 17 of 7+3. Day 14 bone marrow results pending. BC with staph epidermis only sensitive to Vancomycin. Vanc day 3 and cefepime day 4. Vanc trough 12.2 last night. BP remains low but stable. Afebrile x 72 hours.   6/15/2018: Day 18 of 7+3. Day 14 bone marrow biopsy shows persistent disease with 15% blasts. Discussion with patient regarding treatment and she is deciding if she wants to proceed with re-induction vs outpatient maintenance. Temp of 100.4 overnight, unsustained. Day 5 Cefepime and Day 4 of  Vanc for staph epidermis. Repeat cultures NGTD. BP improved. Electrolytes (mag, phos, K and calcium) replaced aggressively this am and will repeat labs this afternoon. No complaints this am.   06/16/2018: Day 19 of 7+3. Day 2 of Flag-JENNIFER. Remains afebrile. Calcium remains low and have increased PO supplementation. Remains on vanc and aztreonam. Somewhat loopy feeling after receiving benadryl.   06/17/2018: Day 20 of 7+3. Day 3 of FLAG-JENNIFER. Multiple electrolyte abnormalities. New bilateral leg and feet swelling.   6/18/2018: Day 21 of 7+3 and Day 4 of FLAG JENNIFER. Tolerating well with some nausea controlled with Zofran. VSS, afebrile. ANC 1900 on Neupogen. Continues Vanc for staph epi bacteremia. Multiple electrolytes replaced today. Complains of edema to lower extremities, not improved after 20 of lasix yesterday. No sob or CP.   6/19/18: Day 22 of 7+3 and Day 5 of FLAG JENNIFER. VSS, afebrile, ANC 3900. Vanc trough elevated and dose adjusted this am. Lower extremity edema improved after 40 of lasix yesterday, net negative 300 cc I&O. Mild nausea, no abdominal pain or diarrhea. Poor appetite but no difficulty eating or taking pills.   6/20/18: Day 23 of 7+3 and Day 6 of FLAG JENNIFER. Patient complains of nausea and vomiting overnight and this am, especially when taking pills. Will add Zyprexa daily in addition to Zofran, compazine, and ativan PRN and will change meds to IV. 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 Afebrile.  No diarrhea, mouth sores or pain.  06/21/2018: Day 24 of 7+3 and Day 7 of FLAG JENNIFER. Nausea better controlled. Continued on Vanc.  6/22/2018: Day 25 of 7+3 and Day 8 of FLAG JENNIFER. Nausea improved some with Zyprexa but did have 1 episode of emesis overnight.continuing meds IV due to vomiting. Remains afebrile. ANC 0. Continues Vanc and Flagyl. Electrolytes replaced.   06/23/2018 : day 26 of 7+3 and Day 9 of FLAG JENNIFER.  Nausea persists,  worsened after taking pills so meds converted to IV.  Encouraged ambulation.  Continue with flagyl for leptotrichia goodfellowii.  RUQ US showed intrahepatic dilation, overall impression hepatic steatosis.  CBD 0.5cm.  No Gallbladder.    06/24/2018 : day 27 of 7+3 and Day 10 of FLAG JENNIFER.  Nausea persisting, able to tolerate some po pills.  Last day of flagyl (day 5).  Still pancytopenic. Appetite still low as po intake triggers nausea.  06/25/2018: day 28 of 7+3 and Day 11 of FLAG JENNIFER. Afebrile, ANC 0. Has completed Flagyl. Will decrease Vanc to 1000 mg q12, trough 19.9, will complete Vanc on 6/27. Liver enzymes stable on Midostaurin. VSS.   6/26/2018: day 29 of 7+3 and day 12 of FLAG JENNIFER. Liver enzymes improved and Midostaurin increased to full dose 50 mg bid. Nausea controlled, afebrile, VSS, NEON.  6/27/2018: day 30 of 7+3 and Day 13 of FLAG JENNIFER. Persistent nausea and emesis x 1 yesterday. Compazine changed to scheduled. Vanc ends today. Afebrile, VSS. Aggressive electrolyte replacement, low electrolytes possibly due to Midostaurin.   6/28/2018: day 31 of 7+3 and Day 14 of FLAG JENNIFER. Nausea improved with scheduled Compazine. Patient has agreed to start converting some IV meds to po. VSS, afebrile, electrolytes wnl today. Only complains of fatigue, new rash noted to upper back and chest and legs, papular rash mildly red.  6/29/2018: Day 32 of 7+3 and Day 15 of FLAG JENNIFER. Day 14 restaging bone marrow biopsy done at bedside today. Patient states nausea improved but she did have emesis last night after taking 9 pm pills. Rash improved. No mouth sores, diarrhea or pain.   7/2/2018: Day 35 of 7+3 and Day 18 of FLAG JENNIFER. Restaging BM bx results pending. Fluid overload over the weekend. Chest x-ray with pulmonary edema. Os sats down to 88%. Placed on O2 at 2 L NC, off O2 this am. Received lasix. Net negative 2.7 L today. 1 episode of nausea, no emesis. Reports anxiety relieved with PRN meds. Electrolytes improved,  afebrile, VSS.   7/3/2018: Day 36 of 7+3 and Day 19 of FLAG JENNIFER. Restaging Bm bx with GABRIEL. Cardiology consulted for abnormal echo, EF 30% with pulmonary HTN and severe L atrial enlargement. EKG with T wave abnormality, possible anterolateral ischemia and prolonged QTc of 530. Medications adjusted. Nausea controlled, no diarrhea. Electrolytes improved. On O2 as needed.   07/04/2018 : Day 37 of 7+3 and Day 20 of FLAG JENNIFER Diarrhea in AM, watery, no associated abdominal pain, nausea, or vomiting.    07/05/2018: Day 38 of 7+3 ane Day 21 of FLAG JENNIFER. No CP or SOB, cardiology following. On RA. All meds changed to po, denies nausea or vomiting and no diarrhea. VSS, afebrile.   7/6/2018: Day 22 of FLAG JENNIFER. Continues to tolerate po meds with no nausea. Afebrile. Awaiting count recovery for discharge.  7/7/2018-/8/2018: Day 40/41 of 7+3, Day 23/24 of FLAG JENNIFER.  Improving clinically.  Started on mag oxide per patient request.  Labs showing signs of ANC recovery.  7/9/2018: Day 25 FLAG JENNIFER, , continues Neupogen. Received platelets today. Afebrile, VSS. Tolerating all oral meds with no nausea or vomiting. Only complains of fatigue.   7/10/2018: Day 26 FLAG JENNIFER,  today. Had 2 episodes of vomiting and diarrhea last night. Feeling better. Afebrile, VSS.   7/11/2018: Day 27 FLAG JENNIFER,  today. No vomiting or diarrhea overnight and no nausea. Afebrile, VSS. Complains of back pain (bone pain) suspect due to count recovery. Relieved with oxy IR and Zyrtec started.  07/12/2018: Day 28 flag jennifer, engrafted today with ANC of 730. Back pain improved with zyrtec. Scheduled for IT chemo tomorrow at 1:30 pm and discharge home thereafter. Will likely need plt transfusion prior to IT chemo tomorrow   7/13/2018: Day 29 of FLAG JENNIFER. ANC up to 1300 today. Transfusing platelets today prior to LP for IT chemo. Patient continues to complain of bone pain, mostly to back and legs. No nausea, diarrhea, sob. Afebrile and VSS.    --hospitalized 7/23-7/25 for C.diff diarrhea, neutropenic fever.  While inpatient she developed pink blisters on her palm that were concerning for relapse.  --palm biopsy on 7/26/18 for suspicion of leukemia cutis was also negative for sign of relapse  --bone marrow biopsy 7/31/18 was negative for signs of relapse   --Admitted on 8/14/18 for HiDAC#1  --Admitted on 9/11/18 for HiDAC#2    Interval History:   Ms. Mac continues to do well at home. Over all she has been doing extremely well with few complaints. Yesterday was the first day she needed a nap. Her appetite remains poor, but she continues to force herself to eat 3 meals a day.    Past Medical History:   Past Medical History:   Diagnosis Date    Cancer 03/2018    AML    CHF (congestive heart failure)     Diarrhea 9/14/2018    Encounter for blood transfusion        Current Medications:   Current Outpatient Medications   Medication Sig    acyclovir (ZOVIRAX) 200 MG capsule Take 2 capsules (400 mg total) by mouth 2 (two) times daily.    ALPRAZolam (XANAX) 0.25 MG tablet Take 1 tablet (0.25 mg total) by mouth 3 (three) times daily as needed for Anxiety.    ciprofloxacin HCl (CIPRO) 500 MG tablet Take 1 tablet (500 mg total) by mouth every 12 (twelve) hours.    dexamethasone (DECADRON) 0.1 % ophthalmic solution Place 1 drop into both eyes every 6 (six) hours. Through 9/19/18.    escitalopram oxalate (LEXAPRO) 10 MG tablet Take 10 mg by mouth once daily.    fluconazole (DIFLUCAN) 200 MG Tab Take 2 tablets (400 mg total) by mouth once daily.    LORazepam (ATIVAN) 0.5 MG tablet Take 1 tablet (0.5 mg total) by mouth every 6 (six) hours as needed (nausea).    magnesium oxide (MAG-OX) 400 mg tablet Take 2 tablets (800 mg total) by mouth 2 (two) times daily.    metoprolol succinate (TOPROL-XL) 25 MG 24 hr tablet Take 0.5 tablets (12.5 mg total) by mouth once daily.    midostaurin (RYDAPT) 25 mg Cap Take 50 mg by mouth 2 (two) times daily. Take only if  instructed by Dr. Troy to restart.    nystatin (DUKE'S SOLUTION) Take 10 mLs by mouth 4 (four) times daily.    omeprazole (PRILOSEC OTC) 20 MG tablet Take 20 mg by mouth every morning.    ondansetron (ZOFRAN) 8 MG tablet Take 1 tablet (8 mg total) by mouth every 12 (twelve) hours as needed for Nausea.    prochlorperazine (COMPAZINE) 10 MG tablet Take 1 tablet (10 mg total) by mouth 4 (four) times daily.    vitamin D 1000 units Tab Take 1 tablet (1,000 Units total) by mouth once daily.     No current facility-administered medications for this visit.      ALLERGIES:   Review of patient's allergies indicates:   Allergen Reactions    Methotrexate analogues      Elevated Liver Enzyme    Bactrim [sulfamethoxazole-trimethoprim] Nausea And Vomiting and Rash       Review of Systems:     Review of Systems   Constitutional: Positive for appetite change and fatigue. Negative for chills, diaphoresis, fever and unexpected weight change.   HENT:   Negative for hearing loss, mouth sores, nosebleeds, sore throat, trouble swallowing and voice change.    Eyes: Negative for eye problems and icterus.   Respiratory: Negative for chest tightness, cough, hemoptysis, shortness of breath and wheezing.    Cardiovascular: Negative for chest pain, leg swelling and palpitations.   Gastrointestinal: Negative for abdominal distention, abdominal pain, blood in stool, diarrhea, nausea and vomiting.   Endocrine: Negative for hot flashes.   Genitourinary: Negative for bladder incontinence, difficulty urinating, dysuria and hematuria.    Musculoskeletal: Negative for arthralgias, back pain, flank pain, gait problem, myalgias, neck pain and neck stiffness.   Skin: Negative for itching, rash and wound.   Neurological: Negative for dizziness, extremity weakness, gait problem, headaches, numbness, seizures and speech difficulty.   Hematological: Negative for adenopathy. Bruises/bleeds easily.   Psychiatric/Behavioral: Positive for sleep  disturbance. Negative for confusion and depression. The patient is nervous/anxious.         Physical Exam:     Vitals:    10/16/18 1115   BP: (!) 125/58   Pulse: 86   Resp: 16   Temp: 98.8 °F (37.1 °C)       Physical Exam   Constitutional: She is oriented to person, place, and time. She appears well-developed and well-nourished. No distress.   HENT:   Head: Normocephalic and atraumatic.   Mouth/Throat: Oropharynx is clear and moist. No oropharyngeal exudate.   No hair, wearing a cap     Eyes: Conjunctivae and EOM are normal. Pupils are equal, round, and reactive to light.   Neck: Normal range of motion. Neck supple. No JVD present. No tracheal deviation present. No thyromegaly present.   Cardiovascular: Normal rate, regular rhythm and normal heart sounds. Exam reveals no friction rub.   No murmur heard.  Pulmonary/Chest: Effort normal and breath sounds normal. No stridor. No respiratory distress. She has no wheezes. She has no rales. She exhibits no tenderness.   Abdominal: Soft. Bowel sounds are normal. She exhibits no distension. There is no tenderness. There is no rebound and no guarding.   Musculoskeletal: Normal range of motion. She exhibits no edema, tenderness or deformity.   Lymphadenopathy:     She has no axillary adenopathy.   Neurological: She is alert and oriented to person, place, and time. She displays normal reflexes. No cranial nerve deficit or sensory deficit. She exhibits normal muscle tone. Coordination normal.   Skin: Skin is warm and dry. Capillary refill takes less than 2 seconds. No rash noted. She is not diaphoretic. No erythema. No pallor.   Numerous lower extremity petechie   Psychiatric: She has a normal mood and affect. Her behavior is normal. Judgment and thought content normal.       ECOG Performance Status: (foot note - ECOG PS provided by Eastern Cooperative Oncology Group) 2 - Symptomatic, <50% confined to bed    Karnofsky Performance Score:  70%- Cares for Self: Unable to Carry on  Normal Activity or Active Work    Labs:   Lab Results   Component Value Date    WBC 2.97 (L) 10/16/2018    HGB 8.7 (L) 10/16/2018    HCT 26.9 (L) 10/16/2018    PLT 80 (L) 10/16/2018    ALT 15 10/16/2018    AST 20 10/16/2018     10/16/2018    K 4.0 10/16/2018     10/16/2018    CREATININE 0.8 10/16/2018    BUN 16 10/16/2018    CO2 23 10/16/2018    INR 1.1 05/31/2018       Imaging: previous imaging has been reviewed    Assessment and Plan:     Mrs. Mac is a pleasant 68 year old female with recently diagnosed AML.      Acute Monocytic Leukemia  -- Admitted from [facility] on [facility]18 early AM with WBC around 100s, for suspected new non-M3 AML  --lo res HLA typing on 5/26/18 and hi res on 5/27/18 done in anticipation of possible need for future transplant   --Pt with two daughters, two full sisters (in their mid 60s, one with brain aneurysm hx, other one without any medical issues), and one full brother (59 y/o healthy).  --NPM1 +, CEBPA (-), FLT3 (+).  On Midostaurin 50 mg PO BID until Day 14 (held starting 6/8 to 6/11). Stopped when FLAG JENNIFER re-induction started. Restarted Midostaurin at 25 mg bid on day 8 of FLAG JENNIFER induction (6/22), increase to full dose 50 mg bid (6/26) as improvement in liver enzymes. Stopped 7/3/18 due to abnormal cardiac echo.  --day 14 bone marrow biopsy completed 6/11 showing persistent disease with 15% CD 34 positive blasts  --Day 60 of FLAG-JENNIFER, tolerating without difficulty except nausea/vomiting  --day 14 restaging bone marrow biopsy done 6/29 without complication, results with no evidence of AML, FLT 3 negative, will need repeat marrow at count recovery outpatient   --recovery marrow showed no evidence of disease  --HiDAC #1 on 8/14/18, HiDAC#2 on 9/11/18  --Cycle 3 delayed 1 week due to pancytopenia. Now planned for 10/16/18  --Okay to proceed with therapy today, with platelet count of 80k, risk out weighs benefit of further treatment delay.    Chemotherapy related  anemia/thromboctopenia   ----Between consolidation therapies mon/thurs labs locally with the reports sent to me  --We were able to transfuse blood locally      Suspected Leukemia Cutis  --pathology from dermatology confirmed inflammatory changes  --now resolved with topical steroid cream    Abnormal Liver Function Tests  --ALT and AST have now normalized. Alk phos 138 today.   --midostaurin started day 8 at 25 mg bid, monitoring liver enzymes closely and improved. Increasing Midostaurin to 50 mg bid 6/26. Stopped Midostaurin (7/3) due to new diagnosis of systolic heart failure EF 35%.  --6/22/18 liver US showing mild intrahepatic dilation, otherwise impression of hepatic steatosis.  Will hold off of MRCP at this time.    --okay to resume midostaurin for HiDAC#3    Chemotherapy induced cardiomyopathy  --cardiology follow-up as outpatient in 3 months  --repeat 2D echo on 6/15 with preserved EF of 60%.  However 7/2/18 echo with reduced EF 30-35%  --Echo on 8/15/18 was within normal range EF of 60-65%    30 minutes were spent face to face with the patient and her  family to discuss the disease, natural history, treatment options and survival statistics. I have provided the patient with an opportunity to ask questions and have all questions answered to her satisfaction.       she will return to clinic in about 2 weeks, but knows to call in the interim if symptoms change or should a problem arise.        Laquita Troy MD  Hematology and Medical Oncology  Bone Marrow Transplant  [facility]

## 2018-10-16 NOTE — Clinical Note
1. Overbook 9am apt on 11/12. Labs: cbc,cmp, type and screen3. Reserve a chair for blood transfusion following appointment with me

## 2018-10-16 NOTE — ASSESSMENT & PLAN NOTE
- transfuse for hgb <7 and plt <10K  - wbc 2.97 with ANC of 1800, hgb 8.7, plt 80K  - stop ppx lovenox if plt <50K  - d/c on antimicrobial ppx

## 2018-10-16 NOTE — ASSESSMENT & PLAN NOTE
- Admitted from [facility] in past for new AML; NPM1 +, CEBPA (-), FLT3 (+)  - Received 7+3 and then FLAG JENNIFER for induction and 2nd induction due to residual disease. Recovery marrow without disease  - Hand rash thought to be leukemia cutis resolved, skin bx did not show cutis   - On midostaurin off and on due to side effects (elevated LFTs) and abnormal cardiac echo; plan upon discharge is attempting to retrial midostaurin per Dr. Troy  - IDAC #1 on 8/14/18, IDAC #2 on 9/11/18, IDAC #3 delayed due to pancytopenia and now on 10/16/18  - Okay to proceed with therapy today, with platelet count of 80k, as risk out weighs benefit of further treatment delay per primary Dr. Troy

## 2018-10-16 NOTE — HPI
68 y.o. female with hx of AML here for cycle #3 of IDAC consolidation. Cycle #3 was delayed by x1 week due to pancytopenia. Counts are now improved and safe for admission. Pt was seen in clinic by Dr. Troy today without complaints.

## 2018-10-16 NOTE — SUBJECTIVE & OBJECTIVE
Patient information was obtained from patient and past medical records.     Oncology History: per last clinic note by Dr. 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  -- NPM1 +, CEBPA (-), FLT3 (+).  On Midostaurin 50 mg PO BID until Day 14 (held starting 6/8 to 6/11). Stopped when FLAG JENNIFER re-induction started. Restarted Midostaurin at 25 mg bid on day 8 of FLAG JENNIFER induction (6/22), increase to full dose 50 mg bid (6/26) as improvement in liver enzymes. Stopped 7/3/18 due to abnormal cardiac echo.  -- Day 14 bone marrow biopsy completed 6/11 showing persistent disease with 15% CD 34 positive blasts  --Day 60 of FLAG-JENNIFER, tolerating without difficulty except nausea/vomiting  -- Day 14 restaging bone marrow biopsy done 6/29 without complication, results with no evidence of AML, FLT 3 negative, will need repeat marrow at count recovery outpatient   -- Recovery marrow showed no evidence of disease  -- HiDAC #1 on 8/14/18, HiDAC#2 on 9/11/18  -- Cycle 3 delayed 1 week due to pancytopenia. Instead planned for 10/16/18    Medications Prior to Admission   Medication Sig Dispense Refill Last Dose    acyclovir (ZOVIRAX) 200 MG capsule Take 2 capsules (400 mg total) by mouth 2 (two) times daily. 120 capsule 11 Taking    ALPRAZolam (XANAX) 0.25 MG tablet Take 1 tablet (0.25 mg total) by mouth 3 (three) times daily as needed for Anxiety. 40 tablet 1 Taking    ciprofloxacin HCl (CIPRO) 500 MG tablet Take 1 tablet (500 mg total) by mouth every 12 (twelve) hours. 60 tablet 3 Taking    dexamethasone (DECADRON) 0.1 % ophthalmic solution Place 1 drop into both eyes every 6 (six)  hours. Through 9/19/18.   Taking    escitalopram oxalate (LEXAPRO) 10 MG tablet Take 10 mg by mouth once daily.   Taking    fluconazole (DIFLUCAN) 200 MG Tab Take 2 tablets (400 mg total) by mouth once daily. 60 tablet 3 Taking    LORazepam (ATIVAN) 0.5 MG tablet Take 1 tablet (0.5 mg total) by mouth every 6 (six) hours as needed (nausea). 60 tablet 0 Taking    magnesium oxide (MAG-OX) 400 mg tablet Take 2 tablets (800 mg total) by mouth 2 (two) times daily. 120 tablet 1 Taking    metoprolol succinate (TOPROL-XL) 25 MG 24 hr tablet Take 0.5 tablets (12.5 mg total) by mouth once daily. 30 tablet 3 Taking    midostaurin (RYDAPT) 25 mg Cap Take 50 mg by mouth 2 (two) times daily. Take only if instructed by Dr. Troy to restart. 56 capsule 2 Taking    nystatin (DUKE'S SOLUTION) Take 10 mLs by mouth 4 (four) times daily. 120 mL 3 Taking    omeprazole (PRILOSEC OTC) 20 MG tablet Take 20 mg by mouth every morning.   Taking    ondansetron (ZOFRAN) 8 MG tablet Take 1 tablet (8 mg total) by mouth every 12 (twelve) hours as needed for Nausea. 30 tablet 2 Taking    prochlorperazine (COMPAZINE) 10 MG tablet Take 1 tablet (10 mg total) by mouth 4 (four) times daily. 60 tablet 3 Taking    vitamin D 1000 units Tab Take 1 tablet (1,000 Units total) by mouth once daily. 30 tablet 2 Taking     Methotrexate analogues; Sulfa (sulfonamide antibiotics); and Bactrim [sulfamethoxazole-trimethoprim]     Past Medical History:   Diagnosis Date    Cancer 03/2018    AML    CHF (congestive heart failure)     Diarrhea 9/14/2018    Encounter for blood transfusion      Past Surgical History:   Procedure Laterality Date    CHOLECYSTECTOMY      HYSTERECTOMY      TONSILLECTOMY       Family History     Problem Relation (Age of Onset)    Cancer Mother, Father        Tobacco Use    Smoking status: Former Smoker     Packs/day: 0.50     Years: 10.00     Pack years: 5.00     Types: Cigarettes     Last attempt to quit: 3/25/2017     Years  since quittin.5    Smokeless tobacco: Never Used   Substance and Sexual Activity    Alcohol use: Yes     Alcohol/week: 0.6 oz     Types: 1 Shots of liquor per week    Drug use: No    Sexual activity: Yes     Partners: Female     Review of Systems   Constitutional: Positive for fatigue. Negative for appetite change, chills, fever and unexpected weight change.   HENT: Positive for mouth sores (after last chemo, now gone). Negative for hearing loss and tinnitus.    Eyes: Negative for photophobia.   Respiratory: Negative for cough, chest tightness and shortness of breath.    Cardiovascular: Negative for chest pain, palpitations and leg swelling.   Gastrointestinal: Negative for abdominal distention, abdominal pain, blood in stool, constipation, diarrhea, nausea and vomiting.   Genitourinary: Negative for difficulty urinating, dysuria, hematuria and urgency.   Musculoskeletal: Negative for back pain.   Skin: Negative for pallor.   Neurological: Negative for dizziness, syncope, numbness and headaches.   Hematological: Negative for adenopathy. Bruises/bleeds easily.   Psychiatric/Behavioral: Negative for confusion. The patient is nervous/anxious.       Objective:     Vital Signs (Most Recent):    Vital Signs (24h Range):  Temp:  [98.8 °F (37.1 °C)] 98.8 °F (37.1 °C)  Pulse:  [86] 86  Resp:  [16] 16  SpO2:  [100 %] 100 %  BP: (125)/(58) 125/58     Weight: 65 kg (143 lb 4.8 oz)  Body mass index is 25.38 kg/m².  Body surface area is 1.7 meters squared.    Lines/Drains/Airways          None        Physical Exam   Constitutional: She is oriented to person, place, and time. Vital signs are normal. She is cooperative.   HENT:   Head: Normocephalic.   Mouth/Throat: No oral lesions (none noted).   Eyes: Lids are normal.   Neck: Trachea normal and normal range of motion.   Cardiovascular: Normal rate, regular rhythm, S1 normal, S2 normal and normal heart sounds.   Pulmonary/Chest: Effort normal and breath sounds normal.    Abdominal: Soft. Normal appearance and bowel sounds are normal.   Musculoskeletal: Normal range of motion.   Neurological: She is alert and oriented to person, place, and time. Coordination and gait normal.   Skin: Skin is dry and intact. She is not diaphoretic. No pallor.   Psychiatric: She has a normal mood and affect. Her speech is normal and behavior is normal. Judgment and thought content normal. Cognition and memory are normal.     Significant Labs:   CBC:   Recent Labs   Lab  10/16/18   1011   WBC  2.97*   HGB  8.7*   HCT  26.9*   PLT  80*    and CMP:   Recent Labs   Lab  10/16/18   1012   NA  136   K  4.0   CL  103   CO2  23   GLU  112*   BUN  16   CREATININE  0.8   CALCIUM  10.5   PROT  6.6   ALBUMIN  3.5   BILITOT  0.3   ALKPHOS  138*   AST  20   ALT  15   ANIONGAP  10   EGFRNONAA  >60.0       Diagnostic Results:  I have reviewed all pertinent imaging results/findings within the past 24 hours.

## 2018-10-16 NOTE — HOSPITAL COURSE
10/16/2018: Admitted from BMT clinic for cycle #3 of HIDAC. To begin tonight.  10/17/2018: Today will be day 2 of HIDAC. No issues overnight.  10/18/2018: Tonight will be day 2 of HIDAC. Slept well, no issues overnight.  10/19/2018: Patient starts day 4 of HIDAC this evening. Will complete chemo Sunday morning. No acute events over night. States appetite is okay.   10/20/2018  no issues overnight. Patient starts day 5 of HIDACtoday. Will complete chemo Sunday morning and likely discharge home. States appetite is okay.   10/21/2018 no issues overnight. Patient starts day 6  of HIDACtoday. Received 1 pRBC. Plan to finish chemo and discharge home today

## 2018-10-17 LAB
ALBUMIN SERPL BCP-MCNC: 3.1 G/DL
ALP SERPL-CCNC: 127 U/L
ALT SERPL W/O P-5'-P-CCNC: 21 U/L
ANION GAP SERPL CALC-SCNC: 8 MMOL/L
AST SERPL-CCNC: 27 U/L
BASOPHILS # BLD AUTO: 0 K/UL
BASOPHILS NFR BLD: 0 %
BILIRUB SERPL-MCNC: 0.3 MG/DL
BUN SERPL-MCNC: 15 MG/DL
CALCIUM SERPL-MCNC: 10.1 MG/DL
CHLORIDE SERPL-SCNC: 107 MMOL/L
CO2 SERPL-SCNC: 24 MMOL/L
CREAT SERPL-MCNC: 0.8 MG/DL
DIFFERENTIAL METHOD: ABNORMAL
EOSINOPHIL # BLD AUTO: 0 K/UL
EOSINOPHIL NFR BLD: 0 %
ERYTHROCYTE [DISTWIDTH] IN BLOOD BY AUTOMATED COUNT: 14 %
EST. GFR  (AFRICAN AMERICAN): >60 ML/MIN/1.73 M^2
EST. GFR  (NON AFRICAN AMERICAN): >60 ML/MIN/1.73 M^2
GLUCOSE SERPL-MCNC: 140 MG/DL
HCT VFR BLD AUTO: 25.2 %
HGB BLD-MCNC: 8.7 G/DL
IMM GRANULOCYTES # BLD AUTO: 0.03 K/UL
IMM GRANULOCYTES NFR BLD AUTO: 1.4 %
LYMPHOCYTES # BLD AUTO: 0.2 K/UL
LYMPHOCYTES NFR BLD: 8.5 %
MAGNESIUM SERPL-MCNC: 1.6 MG/DL
MCH RBC QN AUTO: 31 PG
MCHC RBC AUTO-ENTMCNC: 34.5 G/DL
MCV RBC AUTO: 90 FL
MONOCYTES # BLD AUTO: 0.2 K/UL
MONOCYTES NFR BLD: 9 %
NEUTROPHILS # BLD AUTO: 1.7 K/UL
NEUTROPHILS NFR BLD: 81.1 %
NRBC BLD-RTO: 0 /100 WBC
PHOSPHATE SERPL-MCNC: 3.8 MG/DL
PLATELET # BLD AUTO: 78 K/UL
PMV BLD AUTO: 11.2 FL
POTASSIUM SERPL-SCNC: 4.5 MMOL/L
PROT SERPL-MCNC: 6.1 G/DL
RBC # BLD AUTO: 2.81 M/UL
SODIUM SERPL-SCNC: 139 MMOL/L
WBC # BLD AUTO: 2.12 K/UL

## 2018-10-17 PROCEDURE — 83735 ASSAY OF MAGNESIUM: CPT

## 2018-10-17 PROCEDURE — 36415 COLL VENOUS BLD VENIPUNCTURE: CPT

## 2018-10-17 PROCEDURE — 25000003 PHARM REV CODE 250: Performed by: NURSE PRACTITIONER

## 2018-10-17 PROCEDURE — 99233 SBSQ HOSP IP/OBS HIGH 50: CPT | Mod: ,,, | Performed by: INTERNAL MEDICINE

## 2018-10-17 PROCEDURE — 63600175 PHARM REV CODE 636 W HCPCS: Performed by: NURSE PRACTITIONER

## 2018-10-17 PROCEDURE — 85025 COMPLETE CBC W/AUTO DIFF WBC: CPT

## 2018-10-17 PROCEDURE — 25000003 PHARM REV CODE 250: Performed by: STUDENT IN AN ORGANIZED HEALTH CARE EDUCATION/TRAINING PROGRAM

## 2018-10-17 PROCEDURE — 63600175 PHARM REV CODE 636 W HCPCS: Performed by: INTERNAL MEDICINE

## 2018-10-17 PROCEDURE — 20600001 HC STEP DOWN PRIVATE ROOM

## 2018-10-17 PROCEDURE — 25000003 PHARM REV CODE 250: Performed by: INTERNAL MEDICINE

## 2018-10-17 PROCEDURE — 80053 COMPREHEN METABOLIC PANEL: CPT

## 2018-10-17 PROCEDURE — 84100 ASSAY OF PHOSPHORUS: CPT

## 2018-10-17 RX ORDER — PROCHLORPERAZINE MALEATE 5 MG
10 TABLET ORAL 4 TIMES DAILY PRN
Status: DISCONTINUED | OUTPATIENT
Start: 2018-10-17 | End: 2018-10-21 | Stop reason: HOSPADM

## 2018-10-17 RX ORDER — ACETAMINOPHEN 325 MG/1
650 TABLET ORAL EVERY 6 HOURS PRN
Status: DISCONTINUED | OUTPATIENT
Start: 2018-10-17 | End: 2018-10-21 | Stop reason: HOSPADM

## 2018-10-17 RX ADMIN — DEXAMETHASONE 1 DROP: 1 SUSPENSION OPHTHALMIC at 05:10

## 2018-10-17 RX ADMIN — PANTOPRAZOLE SODIUM 40 MG: 40 TABLET, DELAYED RELEASE ORAL at 09:10

## 2018-10-17 RX ADMIN — ONDANSETRON 8 MG: 4 TABLET, FILM COATED ORAL at 08:10

## 2018-10-17 RX ADMIN — CYTARABINE 5040 MG: 100 INJECTION, SOLUTION INTRATHECAL; INTRAVENOUS; SUBCUTANEOUS at 09:10

## 2018-10-17 RX ADMIN — MAGNESIUM OXIDE TAB 400 MG (241.3 MG ELEMENTAL MG) 400 MG: 400 (241.3 MG) TAB at 04:10

## 2018-10-17 RX ADMIN — METOPROLOL SUCCINATE 12.5 MG: 25 TABLET, EXTENDED RELEASE ORAL at 04:10

## 2018-10-17 RX ADMIN — DEXAMETHASONE 1 DROP: 1 SUSPENSION OPHTHALMIC at 12:10

## 2018-10-17 RX ADMIN — ESCITALOPRAM OXALATE 10 MG: 10 TABLET ORAL at 09:10

## 2018-10-17 RX ADMIN — PROCHLORPERAZINE MALEATE 10 MG: 5 TABLET, FILM COATED ORAL at 09:10

## 2018-10-17 RX ADMIN — MAGNESIUM OXIDE TAB 400 MG (241.3 MG ELEMENTAL MG) 400 MG: 400 (241.3 MG) TAB at 12:10

## 2018-10-17 RX ADMIN — MAGNESIUM OXIDE TAB 400 MG (241.3 MG ELEMENTAL MG) 400 MG: 400 (241.3 MG) TAB at 09:10

## 2018-10-17 RX ADMIN — DEXAMETHASONE 8 MG: 4 TABLET ORAL at 08:10

## 2018-10-17 RX ADMIN — LORAZEPAM 0.5 MG: 0.5 TABLET ORAL at 05:10

## 2018-10-17 RX ADMIN — ENOXAPARIN SODIUM 40 MG: 100 INJECTION SUBCUTANEOUS at 04:10

## 2018-10-17 RX ADMIN — SODIUM CHLORIDE: 0.9 INJECTION, SOLUTION INTRAVENOUS at 12:10

## 2018-10-17 RX ADMIN — ACETAMINOPHEN 650 MG: 325 TABLET, FILM COATED ORAL at 05:10

## 2018-10-17 RX ADMIN — ACYCLOVIR 400 MG: 200 CAPSULE ORAL at 08:10

## 2018-10-17 RX ADMIN — ACYCLOVIR 400 MG: 200 CAPSULE ORAL at 09:10

## 2018-10-17 RX ADMIN — Medication 10 ML: at 08:10

## 2018-10-17 NOTE — PLAN OF CARE
Problem: Patient Care Overview  Goal: Plan of Care Review  Outcome: Ongoing (interventions implemented as appropriate)  Pt admitted for HIDAC. On day 1. IVF continued. No complaints overnight. Instructed patient to call for assistance, bed low and locked, call bell within reach, nonskid socks on, patient verbalized understanding.

## 2018-10-17 NOTE — PROGRESS NOTES
Cytarabine initiated over 3 hours to L forearm PIV with positive blood return. Chemotherapy consent on chart. Drug, 2 pt identifiers, BSA and chemo calculation checked with second certified RN. Instructed pt to call with any problems/discomfort. Verbalized understanding. Call light within reach.

## 2018-10-17 NOTE — SUBJECTIVE & OBJECTIVE
Subjective:     Interval History:   Today will be day 2 of HIDAC. No issues overnight.    Objective:     Vital Signs (Most Recent):  Temp: 97.6 °F (36.4 °C) (10/17/18 0458)  Pulse: 62 (10/17/18 0458)  Resp: 16 (10/17/18 0458)  BP: 136/65 (10/17/18 0458)  SpO2: 98 % (10/17/18 0458) Vital Signs (24h Range):  Temp:  [97.6 °F (36.4 °C)-99 °F (37.2 °C)] 97.6 °F (36.4 °C)  Pulse:  [62-86] 62  Resp:  [16-17] 16  SpO2:  [96 %-100 %] 98 %  BP: (114-136)/(54-65) 136/65     Weight: 64 kg (141 lb 1.5 oz)  Body mass index is 24.99 kg/m².  Body surface area is 1.69 meters squared.      Intake/Output - Last 3 Shifts       10/15 0700 - 10/16 0659 10/16 0700 - 10/17 0659 10/17 0700 - 10/18 0659    P.O.  360     I.V. (mL/kg)  602 (9.4)     IV Piggyback  250     Total Intake(mL/kg)  1212 (18.9)     Urine (mL/kg/hr)  800     Total Output  800     Net  +412                  Physical Exam   Constitutional: She is oriented to person, place, and time. Vital signs are normal. She is cooperative.   HENT:   Head: Normocephalic.   Mouth/Throat: No oral lesions (none noted).   Eyes: Lids are normal.   Neck: Trachea normal and normal range of motion.   Cardiovascular: Normal rate, regular rhythm, S1 normal, S2 normal and normal heart sounds.   Pulmonary/Chest: Effort normal and breath sounds normal.   Abdominal: Soft. Normal appearance and bowel sounds are normal.   Musculoskeletal: Normal range of motion.   Neurological: She is alert and oriented to person, place, and time. Coordination and gait normal.   Skin: Skin is dry and intact. She is not diaphoretic. No pallor.   Psychiatric: She has a normal mood and affect. Her speech is normal and behavior is normal. Judgment and thought content normal. Cognition and memory are normal.       Significant Labs:   CBC:   Recent Labs   Lab  10/16/18   1011  10/17/18   0452   WBC  2.97*  2.12*   HGB  8.7*  8.7*   HCT  26.9*  25.2*   PLT  80*  78*    and CMP:   Recent Labs   Lab  10/16/18   1012   10/17/18   0452   NA  136  139   K  4.0  4.5   CL  103  107   CO2  23  24   GLU  112*  140*   BUN  16  15   CREATININE  0.8  0.8   CALCIUM  10.5  10.1   PROT  6.6  6.1   ALBUMIN  3.5  3.1*   BILITOT  0.3  0.3   ALKPHOS  138*  127   AST  20  27   ALT  15  21   ANIONGAP  10  8   EGFRNONAA  >60.0  >60.0       Diagnostic Results:  I have reviewed all pertinent imaging results/findings within the past 24 hours.

## 2018-10-17 NOTE — PROGRESS NOTES
Admit Assessment    Patient Identification  Sabrina Mac   :  1950  Admit Date:  10/16/2018  Attending Provider:  Laquita Troy MD              Referral:   Pt was admitted to  with a diagnosis of AML (acute myeloblastic leukemia), and was admitted this hospital stay due to AML (acute myeloblastic leukemia) [C92.00]  AML (acute myeloblastic leukemia) [C92.00].   is involved was referred to the Social Work Department via routine referral.  Patient presents as a 68 y.o. year old  female.    Persons interviewed: patient     Living Situation:  Pt. States that she resides at home with her spouse (Jesi [phone]). She states that she has been fairly independent with all adl's prior to admission but that her  is able to assist with needs if necessary.      Resides at [address] [place] [address] [place] [address] [place] [address], phone: [phone] (home).      Functional Status Prior  Ambulation: 0-->independent  Transferrin-->independent  Toiletin-->independent    Current or Past Agencies and Description of Services/Supplies    DME  Agency Name: none  Agency Phone Number: n/a       Home Health  Agency Name: none  Agency Phone Number: n/a  Services: n/a    IV Infusion  Agency Name: none  Agency Phone Number: n/a    Nutrition: oral    Outpatient Pharmacy:     [facility] [address] [address]  Phone: [phone] Fax: [phone]    [place], [place] - [address] [facility]  [address] [address]  Phone: [phone] Fax: [phone]      Patient Preference of agencies include: none noted    Patient/Caregiver informed of right to choose providers or agencies.  Patient provides permission to release any necessary information to [facility] and to Non[facility] agencies as needed to facilitate patient care, treatment planning, and patient discharge planning.  Written and verbal resources  provided.      Coping: pt. States that she is doing OK. She is very optimistic about recovery as she is receiving last cycle of chemo.  very supportive of pts. Needs.           Adjustment to Diagnosis and Treatment: appropriate      Emotional/Behavioral/Cognitive Issues: none noted            History/Current Symptoms of Anxiety/Depression: No:   History/Current Substance Use:   Social History     Tobacco Use    Smoking status: Former Smoker     Packs/day: 0.50     Years: 10.00     Pack years: 5.00     Types: Cigarettes     Last attempt to quit: 3/25/2017     Years since quittin.5    Smokeless tobacco: Never Used   Substance and Sexual Activity    Alcohol use: Yes     Alcohol/week: 0.6 oz     Types: 1 Shots of liquor per week    Drug use: No    Sexual activity: Yes     Partners: Female       Indications of Abuse/Neglect: No:   Abuse Screen  Do You Feel Unsafe at Home, Work or School?: no    Financial:  Payor/Plan Subscr  Sex Relation Sub. Ins. ID Effective Group Num   1. MEDICARE - [place]* ITZEL DOE 1950 Female  [id number]                                    [address]   2. AET[employer] - MEDIC* ITZEL DOE 1950 Female  [id number]                                    [address] [address]                            Other identified concerns/needs: none noted    Plan: to return home with her spouse who will be the primary caregiver.     Interventions/Referrals: TBD  Patient/caregiver engaged in treatment planning process.     providing psychosocial and supportive counseling, resources, education, assistance and discharge planning as appropriate.  Patient/caregiver state understanding of  available resources,  following, remains available. Provided pt. With pennie'er phone # and encouraged her to call if needed. Will follow.

## 2018-10-17 NOTE — PROGRESS NOTES
[facility]  Hematology  Bone Marrow Transplant  Progress Note    Patient Name: Sabrina Mac  Admission Date: 10/16/2018  Hospital Length of Stay: 1 days  Code Status: Full Code    Subjective:     Interval History:   Today will be day 2 of HIDAC. No issues overnight.    Objective:     Vital Signs (Most Recent):  Temp: 97.6 °F (36.4 °C) (10/17/18 0458)  Pulse: 62 (10/17/18 0458)  Resp: 16 (10/17/18 0458)  BP: 136/65 (10/17/18 0458)  SpO2: 98 % (10/17/18 0458) Vital Signs (24h Range):  Temp:  [97.6 °F (36.4 °C)-99 °F (37.2 °C)] 97.6 °F (36.4 °C)  Pulse:  [62-86] 62  Resp:  [16-17] 16  SpO2:  [96 %-100 %] 98 %  BP: (114-136)/(54-65) 136/65     Weight: 64 kg (141 lb 1.5 oz)  Body mass index is 24.99 kg/m².  Body surface area is 1.69 meters squared.      Intake/Output - Last 3 Shifts       10/15 0700 - 10/16 0659 10/16 0700 - 10/17 0659 10/17 0700 - 10/18 0659    P.O.  360     I.V. (mL/kg)  602 (9.4)     IV Piggyback  250     Total Intake(mL/kg)  1212 (18.9)     Urine (mL/kg/hr)  800     Total Output  800     Net  +412                  Physical Exam   Constitutional: She is oriented to person, place, and time. Vital signs are normal. She is cooperative.   HENT:   Head: Normocephalic.   Mouth/Throat: No oral lesions (none noted).   Eyes: Lids are normal.   Neck: Trachea normal and normal range of motion.   Cardiovascular: Normal rate, regular rhythm, S1 normal, S2 normal and normal heart sounds.   Pulmonary/Chest: Effort normal and breath sounds normal.   Abdominal: Soft. Normal appearance and bowel sounds are normal.   Musculoskeletal: Normal range of motion.   Neurological: She is alert and oriented to person, place, and time. Coordination and gait normal.   Skin: Skin is dry and intact. She is not diaphoretic. No pallor.   Psychiatric: She has a normal mood and affect. Her speech is normal and behavior is normal. Judgment and thought content normal. Cognition and memory are normal.        Significant Labs:   CBC:   Recent Labs   Lab  10/16/18   1011  10/17/18   0452   WBC  2.97*  2.12*   HGB  8.7*  8.7*   HCT  26.9*  25.2*   PLT  80*  78*    and CMP:   Recent Labs   Lab  10/16/18   1012  10/17/18   0452   NA  136  139   K  4.0  4.5   CL  103  107   CO2  23  24   GLU  112*  140*   BUN  16  15   CREATININE  0.8  0.8   CALCIUM  10.5  10.1   PROT  6.6  6.1   ALBUMIN  3.5  3.1*   BILITOT  0.3  0.3   ALKPHOS  138*  127   AST  20  27   ALT  15  21   ANIONGAP  10  8   EGFRNONAA  >60.0  >60.0       Diagnostic Results:  I have reviewed all pertinent imaging results/findings within the past 24 hours.    Assessment/Plan:     * AML (acute myeloblastic leukemia)    - Admitted from [facility] in past for new AML; NPM1 +, CEBPA (-), FLT3 (+)  - Received 7+3 and then FLAG JENNIFER for induction and 2nd induction due to residual disease. Recovery marrow without disease  - Hand rash thought to be leukemia cutis resolved, skin bx did not show cutis   - On midostaurin off and on due to side effects (elevated LFTs) and abnormal cardiac echo; plan upon discharge is attempting to retrial midostaurin per Dr. Troy  - HIDAC #1 on 8/14/18, HIDAC #2 on 9/11/18, HIDAC #3 delayed due to pancytopenia and now on 10/16/18. Today will be day 2 of cycle 3.  - Okay to proceed with therapy today, with platelet count of 80k, as risk out weighs benefit of further treatment delay per primary Dr. Troy        Pancytopenia due to chemotherapy    - transfuse for hgb <7 and plt <10K  - wbc 2.12 with ANC of 1720, hgb 8.7, plt 78K  - stop ppx lovenox if plt <50K  - d/c on antimicrobial ppx        CINV (chemotherapy-induced nausea and vomiting)    - continue prn antiemetics from home at this time         Mucositis due to chemotherapy    - history of after chemo  - will continue home dukes solution         Chemotherapy induced cardiomyopathy    - post induction, now resolved  - last echo with improved EF         History of ventricular  fibrillation    - continue home metoprolol 12.5 mg at 4:30 pm daily per pt request         Elevated LFTs    - history of elevated LFTs 2/2 midostaurin  - intermittently holding midostaurin  - plan to retrial after this cycle per Dr. Troy  - LFTs normal at this time        Electrolyte abnormality    - replace per BMT electrolyte protocol   - electrolytes normal today        Depression with anxiety    - continue home lexapro  - continue home xanax and ativan prn             VTE Risk Mitigation (From admission, onward)        Ordered     enoxaparin injection 40 mg  Daily      10/16/18 1403     heparin, porcine (PF) 100 unit/mL injection flush 300 Units  As needed (PRN)      10/16/18 1610     IP VTE HIGH RISK PATIENT  Once      10/16/18 1403          Disposition: pending completion of chemotherapy    Norma Berg NP  Bone Marrow Transplant  [facility]

## 2018-10-17 NOTE — PROGRESS NOTES
Chemo note: cytarabine (PF) (CYTOSAR) 3,000 mg/m2 = 5,040 mg in sodium chloride 0.9% 250 mL chemo infusion started infusing to L FA PIV. Positive blood return noted. Consent in chart. Chemo precautions maintained. Premedications given as ordered. Will monitor patient.

## 2018-10-17 NOTE — ASSESSMENT & PLAN NOTE
- Admitted from [facility] in past for new AML; NPM1 +, CEBPA (-), FLT3 (+)  - Received 7+3 and then FLAG JENNIFER for induction and 2nd induction due to residual disease. Recovery marrow without disease  - Hand rash thought to be leukemia cutis resolved, skin bx did not show cutis   - On midostaurin off and on due to side effects (elevated LFTs) and abnormal cardiac echo; plan upon discharge is attempting to retrial midostaurin per Dr. Troy  - HIDAC #1 on 8/14/18, HIDAC #2 on 9/11/18, HIDAC #3 delayed due to pancytopenia and now on 10/16/18. Today will be day 2 of cycle 3.  - Okay to proceed with therapy today, with platelet count of 80k, as risk out weighs benefit of further treatment delay per primary Dr. Troy

## 2018-10-17 NOTE — ASSESSMENT & PLAN NOTE
- history of elevated LFTs 2/2 midostaurin  - intermittently holding midostaurin  - plan to retrial after this cycle per Dr. Troy  - LFTs normal at this time

## 2018-10-17 NOTE — PLAN OF CARE
MDR's with Dr Tong.  Patient admitted for cycle # 3 of HiDAC.  Planning to d/c on Sunday after chemo completes if stable.  CM will continue to follow.

## 2018-10-17 NOTE — ASSESSMENT & PLAN NOTE
- transfuse for hgb <7 and plt <10K  - wbc 2.12 with ANC of 1720, hgb 8.7, plt 78K  - stop ppx lovenox if plt <50K  - d/c on antimicrobial ppx

## 2018-10-17 NOTE — PLAN OF CARE
Problem: Patient Care Overview  Goal: Plan of Care Review  Outcome: Ongoing (interventions implemented as appropriate)  Patient AAOX4 and up independently to bathroom; fall precautions maintained. Magnesium replaced. Cytarabine infused today, as ordered. Scheduled Compazine changed to PRN. Patient stable, resting for majority of shift, and will continue to monitor.

## 2018-10-18 LAB
ALBUMIN SERPL BCP-MCNC: 3.1 G/DL
ALP SERPL-CCNC: 121 U/L
ALT SERPL W/O P-5'-P-CCNC: 25 U/L
ANION GAP SERPL CALC-SCNC: 7 MMOL/L
AST SERPL-CCNC: 24 U/L
BASOPHILS # BLD AUTO: 0 K/UL
BASOPHILS NFR BLD: 0 %
BILIRUB SERPL-MCNC: 0.4 MG/DL
BUN SERPL-MCNC: 20 MG/DL
CALCIUM SERPL-MCNC: 10.1 MG/DL
CHLORIDE SERPL-SCNC: 110 MMOL/L
CO2 SERPL-SCNC: 24 MMOL/L
CREAT SERPL-MCNC: 0.8 MG/DL
DIFFERENTIAL METHOD: ABNORMAL
EOSINOPHIL # BLD AUTO: 0 K/UL
EOSINOPHIL NFR BLD: 0 %
ERYTHROCYTE [DISTWIDTH] IN BLOOD BY AUTOMATED COUNT: 14.2 %
EST. GFR  (AFRICAN AMERICAN): >60 ML/MIN/1.73 M^2
EST. GFR  (NON AFRICAN AMERICAN): >60 ML/MIN/1.73 M^2
GLUCOSE SERPL-MCNC: 127 MG/DL
HCT VFR BLD AUTO: 22 %
HGB BLD-MCNC: 7.4 G/DL
IMM GRANULOCYTES # BLD AUTO: 0.01 K/UL
IMM GRANULOCYTES NFR BLD AUTO: 0.3 %
LYMPHOCYTES # BLD AUTO: 0.1 K/UL
LYMPHOCYTES NFR BLD: 1.8 %
MAGNESIUM SERPL-MCNC: 1.9 MG/DL
MCH RBC QN AUTO: 30.2 PG
MCHC RBC AUTO-ENTMCNC: 33.6 G/DL
MCV RBC AUTO: 90 FL
MONOCYTES # BLD AUTO: 0.3 K/UL
MONOCYTES NFR BLD: 8.7 %
NEUTROPHILS # BLD AUTO: 3.5 K/UL
NEUTROPHILS NFR BLD: 89.2 %
NRBC BLD-RTO: 0 /100 WBC
PHOSPHATE SERPL-MCNC: 4.5 MG/DL
PLATELET # BLD AUTO: 73 K/UL
PMV BLD AUTO: 10.8 FL
POTASSIUM SERPL-SCNC: 4.4 MMOL/L
PROT SERPL-MCNC: 5.8 G/DL
RBC # BLD AUTO: 2.45 M/UL
SODIUM SERPL-SCNC: 141 MMOL/L
WBC # BLD AUTO: 3.93 K/UL

## 2018-10-18 PROCEDURE — 20600001 HC STEP DOWN PRIVATE ROOM

## 2018-10-18 PROCEDURE — 63600175 PHARM REV CODE 636 W HCPCS: Performed by: NURSE PRACTITIONER

## 2018-10-18 PROCEDURE — 80053 COMPREHEN METABOLIC PANEL: CPT

## 2018-10-18 PROCEDURE — 25000003 PHARM REV CODE 250: Performed by: NURSE PRACTITIONER

## 2018-10-18 PROCEDURE — 36415 COLL VENOUS BLD VENIPUNCTURE: CPT

## 2018-10-18 PROCEDURE — 99233 SBSQ HOSP IP/OBS HIGH 50: CPT | Mod: ,,, | Performed by: INTERNAL MEDICINE

## 2018-10-18 PROCEDURE — 83735 ASSAY OF MAGNESIUM: CPT

## 2018-10-18 PROCEDURE — 85025 COMPLETE CBC W/AUTO DIFF WBC: CPT

## 2018-10-18 PROCEDURE — 25000003 PHARM REV CODE 250: Performed by: INTERNAL MEDICINE

## 2018-10-18 PROCEDURE — 63600175 PHARM REV CODE 636 W HCPCS: Performed by: INTERNAL MEDICINE

## 2018-10-18 PROCEDURE — 84100 ASSAY OF PHOSPHORUS: CPT

## 2018-10-18 RX ORDER — DEXAMETHASONE SODIUM PHOSPHATE 1 MG/ML
1 SOLUTION/ DROPS OPHTHALMIC EVERY 6 HOURS
Qty: 5 ML | Refills: 0 | Status: SHIPPED | OUTPATIENT
Start: 2018-10-18 | End: 2018-10-18 | Stop reason: SDUPTHER

## 2018-10-18 RX ORDER — DEXAMETHASONE SODIUM PHOSPHATE 1 MG/ML
1 SOLUTION/ DROPS OPHTHALMIC EVERY 6 HOURS
Qty: 5 ML | Refills: 0 | Status: SHIPPED | OUTPATIENT
Start: 2018-10-21 | End: 2018-11-10

## 2018-10-18 RX ORDER — DEXAMETHASONE SODIUM PHOSPHATE 1 MG/ML
1 SOLUTION/ DROPS OPHTHALMIC EVERY 6 HOURS
Qty: 5 ML | Refills: 0 | Status: SHIPPED | OUTPATIENT
Start: 2018-10-20 | End: 2018-10-18 | Stop reason: SDUPTHER

## 2018-10-18 RX ADMIN — ACYCLOVIR 400 MG: 200 CAPSULE ORAL at 09:10

## 2018-10-18 RX ADMIN — METOPROLOL SUCCINATE 12.5 MG: 25 TABLET, EXTENDED RELEASE ORAL at 05:10

## 2018-10-18 RX ADMIN — SODIUM CHLORIDE: 0.9 INJECTION, SOLUTION INTRAVENOUS at 08:10

## 2018-10-18 RX ADMIN — DEXAMETHASONE 1 DROP: 1 SUSPENSION OPHTHALMIC at 05:10

## 2018-10-18 RX ADMIN — DEXAMETHASONE 1 DROP: 1 SUSPENSION OPHTHALMIC at 06:10

## 2018-10-18 RX ADMIN — DEXAMETHASONE 1 DROP: 1 SUSPENSION OPHTHALMIC at 11:10

## 2018-10-18 RX ADMIN — DEXAMETHASONE 1 DROP: 1 SUSPENSION OPHTHALMIC at 12:10

## 2018-10-18 RX ADMIN — ONDANSETRON 8 MG: 4 TABLET, FILM COATED ORAL at 05:10

## 2018-10-18 RX ADMIN — ENOXAPARIN SODIUM 40 MG: 100 INJECTION SUBCUTANEOUS at 05:10

## 2018-10-18 RX ADMIN — PANTOPRAZOLE SODIUM 40 MG: 40 TABLET, DELAYED RELEASE ORAL at 09:10

## 2018-10-18 RX ADMIN — MAGNESIUM OXIDE TAB 400 MG (241.3 MG ELEMENTAL MG) 400 MG: 400 (241.3 MG) TAB at 09:10

## 2018-10-18 RX ADMIN — ACYCLOVIR 400 MG: 200 CAPSULE ORAL at 08:10

## 2018-10-18 RX ADMIN — MAGNESIUM OXIDE TAB 400 MG (241.3 MG ELEMENTAL MG) 400 MG: 400 (241.3 MG) TAB at 06:10

## 2018-10-18 RX ADMIN — ESCITALOPRAM OXALATE 10 MG: 10 TABLET ORAL at 09:10

## 2018-10-18 RX ADMIN — CYTARABINE 5040 MG: 100 INJECTION, SOLUTION INTRATHECAL; INTRAVENOUS; SUBCUTANEOUS at 08:10

## 2018-10-18 RX ADMIN — DEXAMETHASONE 8 MG: 4 TABLET ORAL at 05:10

## 2018-10-18 NOTE — ASSESSMENT & PLAN NOTE
- Admitted from [facility] in past for new AML; NPM1 +, CEBPA (-), FLT3 (+)  - Received 7+3 and then FLAG JENNIFER for induction and 2nd induction due to residual disease. Recovery marrow without disease  - Hand rash thought to be leukemia cutis resolved, skin bx did not show cutis   - On midostaurin off and on due to side effects (elevated LFTs) and abnormal cardiac echo; plan upon discharge is attempting to retrial midostaurin per Dr. Troy  - HIDAC #1 on 8/14/18, HIDAC #2 on 9/11/18, HIDAC #3 delayed due to pancytopenia and now on 10/16/18. Tonight will be day 3 of cycle 3.  - Was deemed okay to proceed with therapy with platelet count of 80k, as risk out weighs benefit of further treatment delay per primary oncologist Dr. Troy  - Will d/c Sunday if stable after chemo with dex eye drops and antimicrobial ppx, and dose reduced midostaurin (on day 8)

## 2018-10-18 NOTE — ASSESSMENT & PLAN NOTE
- transfuse for hgb <7 and plt <10K  - WBC and ANC normal, hgb 7.4, plt 73K  - stop ppx lovenox if plt <50K  - d/c on antimicrobial ppx

## 2018-10-18 NOTE — ASSESSMENT & PLAN NOTE
- replace per BMT electrolyte protocol should electrolyte become abnormal  - electrolytes normal today

## 2018-10-18 NOTE — PLAN OF CARE
Problem: Patient Care Overview  Goal: Plan of Care Review  Outcome: Ongoing (interventions implemented as appropriate)  Pt on day 2 of HIDAC . No chemotherapy to be given today. IVf continued at 50cc/hr. Denies any paihn, nausea, diarrhea, mouth sores. Up ad gunnar. Bed side commode nearby for use.

## 2018-10-18 NOTE — ASSESSMENT & PLAN NOTE
- history of elevated LFTs 2/2 midostaurin  - intermittently holding midostaurin  - plan to retrial after this cycle per Dr. Troy at day 8 with reduced dose  - LFTs normal at this time

## 2018-10-18 NOTE — PLAN OF CARE
Problem: Patient Care Overview  Goal: Plan of Care Review  Outcome: Ongoing (interventions implemented as appropriate)  Patient AAOX4 and up independently, resting for majority of shift. Oral hydration and nutrition encouraged. Magnesium replaced. Patient to start Day 3 of HIDAC tonight. Patient free of pain and nausea, stable, and will continue to monitor.

## 2018-10-18 NOTE — SUBJECTIVE & OBJECTIVE
Subjective:     Interval History:   Tonight will be day 2 of HIDAC. Slept well, no issues overnight.    Objective:     Vital Signs (Most Recent):  Temp: 97.3 °F (36.3 °C) (10/18/18 0400)  Pulse: 62 (10/18/18 0400)  Resp: 18 (10/18/18 0400)  BP: 123/60 (10/18/18 0400)  SpO2: 96 % (10/18/18 0000) Vital Signs (24h Range):  Temp:  [96.2 °F (35.7 °C)-98.4 °F (36.9 °C)] 97.3 °F (36.3 °C)  Pulse:  [62-76] 62  Resp:  [16-18] 18  SpO2:  [96 %-97 %] 96 %  BP: (114-123)/(55-65) 123/60     Weight: 64.5 kg (142 lb 3.2 oz)  Body mass index is 25.19 kg/m².  Body surface area is 1.69 meters squared.    Intake/Output - Last 3 Shifts       10/16 0700 - 10/17 0659 10/17 0700 - 10/18 0659 10/18 0700 - 10/19 0659    P.O. 360 960     I.V. (mL/kg) 602 (9.4) 1186.7 (18.4)     IV Piggyback 250 250     Total Intake(mL/kg) 1212 (18.9) 2396.7 (37.2)     Urine (mL/kg/hr) 800 1400 (0.9)     Total Output 800 1400     Net +412 +996.7            Urine Occurrence  3 x     Stool Occurrence  1 x         Physical Exam   Constitutional: She is oriented to person, place, and time. Vital signs are normal. She is cooperative.   HENT:   Head: Normocephalic.   Mouth/Throat: No oral lesions (none noted).   Eyes: Lids are normal.   Neck: Trachea normal and normal range of motion.   Cardiovascular: Normal rate, regular rhythm, S1 normal, S2 normal and normal heart sounds.   Pulmonary/Chest: Effort normal and breath sounds normal.   Abdominal: Soft. Normal appearance and bowel sounds are normal.   Musculoskeletal: Normal range of motion.   Neurological: She is alert and oriented to person, place, and time. Coordination and gait normal.   Skin: Skin is dry and intact. She is not diaphoretic. No pallor.   Psychiatric: She has a normal mood and affect. Her speech is normal and behavior is normal. Judgment and thought content normal. Cognition and memory are normal.     Significant Labs:   CBC:   Recent Labs   Lab 10/16/18  1011 10/17/18  0452 10/18/18  0414   WBC  2.97* 2.12* 3.93   HGB 8.7* 8.7* 7.4*   HCT 26.9* 25.2* 22.0*   PLT 80* 78* 73*    and CMP:   Recent Labs   Lab 10/16/18  1012 10/17/18  0452 10/18/18  0414    139 141   K 4.0 4.5 4.4    107 110   CO2 23 24 24   * 140* 127*   BUN 16 15 20   CREATININE 0.8 0.8 0.8   CALCIUM 10.5 10.1 10.1   PROT 6.6 6.1 5.8*   ALBUMIN 3.5 3.1* 3.1*   BILITOT 0.3 0.3 0.4   ALKPHOS 138* 127 121   AST 20 27 24   ALT 15 21 25   ANIONGAP 10 8 7*   EGFRNONAA >60.0 >60.0 >60.0     Diagnostic Results:  I have reviewed all pertinent imaging results/findings within the past 24 hours.

## 2018-10-19 LAB
ALBUMIN SERPL BCP-MCNC: 3.1 G/DL
ALP SERPL-CCNC: 111 U/L
ALT SERPL W/O P-5'-P-CCNC: 19 U/L
ANION GAP SERPL CALC-SCNC: 4 MMOL/L
AST SERPL-CCNC: 19 U/L
BASOPHILS # BLD AUTO: 0 K/UL
BASOPHILS NFR BLD: 0 %
BILIRUB SERPL-MCNC: 0.5 MG/DL
BUN SERPL-MCNC: 21 MG/DL
CALCIUM SERPL-MCNC: 9.9 MG/DL
CHLORIDE SERPL-SCNC: 109 MMOL/L
CO2 SERPL-SCNC: 26 MMOL/L
CREAT SERPL-MCNC: 0.7 MG/DL
DIFFERENTIAL METHOD: ABNORMAL
EOSINOPHIL # BLD AUTO: 0 K/UL
EOSINOPHIL NFR BLD: 0 %
ERYTHROCYTE [DISTWIDTH] IN BLOOD BY AUTOMATED COUNT: 14.2 %
EST. GFR  (AFRICAN AMERICAN): >60 ML/MIN/1.73 M^2
EST. GFR  (NON AFRICAN AMERICAN): >60 ML/MIN/1.73 M^2
GLUCOSE SERPL-MCNC: 114 MG/DL
HCT VFR BLD AUTO: 22 %
HGB BLD-MCNC: 7.6 G/DL
IMM GRANULOCYTES # BLD AUTO: 0.02 K/UL
IMM GRANULOCYTES NFR BLD AUTO: 0.7 %
LYMPHOCYTES # BLD AUTO: 0 K/UL
LYMPHOCYTES NFR BLD: 1.1 %
MAGNESIUM SERPL-MCNC: 1.9 MG/DL
MCH RBC QN AUTO: 31 PG
MCHC RBC AUTO-ENTMCNC: 34.5 G/DL
MCV RBC AUTO: 90 FL
MONOCYTES # BLD AUTO: 0.1 K/UL
MONOCYTES NFR BLD: 2.6 %
NEUTROPHILS # BLD AUTO: 2.6 K/UL
NEUTROPHILS NFR BLD: 95.6 %
NRBC BLD-RTO: 0 /100 WBC
PHOSPHATE SERPL-MCNC: 4.1 MG/DL
PLATELET # BLD AUTO: 74 K/UL
PMV BLD AUTO: 11.1 FL
POTASSIUM SERPL-SCNC: 4.5 MMOL/L
PROT SERPL-MCNC: 5.7 G/DL
RBC # BLD AUTO: 2.45 M/UL
SODIUM SERPL-SCNC: 139 MMOL/L
WBC # BLD AUTO: 2.7 K/UL

## 2018-10-19 PROCEDURE — 83735 ASSAY OF MAGNESIUM: CPT

## 2018-10-19 PROCEDURE — 63600175 PHARM REV CODE 636 W HCPCS: Performed by: INTERNAL MEDICINE

## 2018-10-19 PROCEDURE — 25000003 PHARM REV CODE 250: Performed by: STUDENT IN AN ORGANIZED HEALTH CARE EDUCATION/TRAINING PROGRAM

## 2018-10-19 PROCEDURE — 80053 COMPREHEN METABOLIC PANEL: CPT

## 2018-10-19 PROCEDURE — 84100 ASSAY OF PHOSPHORUS: CPT

## 2018-10-19 PROCEDURE — 25000003 PHARM REV CODE 250: Performed by: INTERNAL MEDICINE

## 2018-10-19 PROCEDURE — 25000003 PHARM REV CODE 250: Performed by: NURSE PRACTITIONER

## 2018-10-19 PROCEDURE — 20600001 HC STEP DOWN PRIVATE ROOM

## 2018-10-19 PROCEDURE — 63600175 PHARM REV CODE 636 W HCPCS: Performed by: NURSE PRACTITIONER

## 2018-10-19 PROCEDURE — 36415 COLL VENOUS BLD VENIPUNCTURE: CPT

## 2018-10-19 PROCEDURE — 85025 COMPLETE CBC W/AUTO DIFF WBC: CPT

## 2018-10-19 PROCEDURE — 99233 SBSQ HOSP IP/OBS HIGH 50: CPT | Mod: ,,, | Performed by: INTERNAL MEDICINE

## 2018-10-19 RX ADMIN — DEXAMETHASONE 8 MG: 4 TABLET ORAL at 05:10

## 2018-10-19 RX ADMIN — ACYCLOVIR 400 MG: 200 CAPSULE ORAL at 09:10

## 2018-10-19 RX ADMIN — SODIUM CHLORIDE: 0.9 INJECTION, SOLUTION INTRAVENOUS at 11:10

## 2018-10-19 RX ADMIN — ESCITALOPRAM OXALATE 10 MG: 10 TABLET ORAL at 09:10

## 2018-10-19 RX ADMIN — ENOXAPARIN SODIUM 40 MG: 100 INJECTION SUBCUTANEOUS at 04:10

## 2018-10-19 RX ADMIN — DEXAMETHASONE 1 DROP: 1 SUSPENSION OPHTHALMIC at 11:10

## 2018-10-19 RX ADMIN — ALPRAZOLAM 0.25 MG: 0.25 TABLET ORAL at 04:10

## 2018-10-19 RX ADMIN — CYTARABINE 5040 MG: 100 INJECTION, SOLUTION INTRATHECAL; INTRAVENOUS; SUBCUTANEOUS at 09:10

## 2018-10-19 RX ADMIN — DEXAMETHASONE 1 DROP: 1 SUSPENSION OPHTHALMIC at 01:10

## 2018-10-19 RX ADMIN — ONDANSETRON 8 MG: 4 TABLET, FILM COATED ORAL at 05:10

## 2018-10-19 RX ADMIN — ACETAMINOPHEN 650 MG: 325 TABLET, FILM COATED ORAL at 04:10

## 2018-10-19 RX ADMIN — DEXAMETHASONE 1 DROP: 1 SUSPENSION OPHTHALMIC at 12:10

## 2018-10-19 RX ADMIN — PANTOPRAZOLE SODIUM 40 MG: 40 TABLET, DELAYED RELEASE ORAL at 09:10

## 2018-10-19 RX ADMIN — DEXAMETHASONE 1 DROP: 1 SUSPENSION OPHTHALMIC at 07:10

## 2018-10-19 RX ADMIN — METOPROLOL SUCCINATE 12.5 MG: 25 TABLET, EXTENDED RELEASE ORAL at 04:10

## 2018-10-19 RX ADMIN — DEXAMETHASONE 1 DROP: 1 SUSPENSION OPHTHALMIC at 05:10

## 2018-10-19 NOTE — ASSESSMENT & PLAN NOTE
- transfuse for hgb <7 and plt <10K  - WBC 2.7, hgb 7.6, plt 74K  - stop ppx lovenox if plt <50K  - d/c on antimicrobial ppx

## 2018-10-19 NOTE — SUBJECTIVE & OBJECTIVE
Subjective:     Interval History: Patient starts day 4 of HIDAC this evening. Will complete chemo Sunday morning. No acute events over night. States appetite is okay.     Objective:     Vital Signs (Most Recent):  Temp: 98.8 °F (37.1 °C) (10/19/18 0400)  Pulse: (!) 57 (10/19/18 0400)  Resp: 18 (10/19/18 0400)  BP: 137/64 (10/19/18 0400)  SpO2: 98 % (10/19/18 0400) Vital Signs (24h Range):  Temp:  [97.5 °F (36.4 °C)-98.8 °F (37.1 °C)] 98.8 °F (37.1 °C)  Pulse:  [57-62] 57  Resp:  [18-20] 18  SpO2:  [96 %-98 %] 98 %  BP: (115-140)/(58-67) 137/64     Weight: 64.4 kg (141 lb 13.9 oz)  Body mass index is 25.13 kg/m².  Body surface area is 1.69 meters squared.    Intake/Output - Last 3 Shifts       10/17 0700 - 10/18 0659 10/18 0700 - 10/19 0659 10/19 0700 - 10/20 0659    P.O. 960 1085     I.V. (mL/kg) 1186.7 (18.4) 1139.8 (17.7)     IV Piggyback 250 250     Total Intake(mL/kg) 2396.7 (37.2) 2474.8 (38.5)     Urine (mL/kg/hr) 1400 (0.9) 2250 (1.5)     Total Output 1400 2250     Net +996.7 +224.8            Urine Occurrence 3 x      Stool Occurrence 1 x          Physical Exam   Constitutional: She is oriented to person, place, and time. Vital signs are normal. She appears well-developed and well-nourished. She is cooperative.   HENT:   Head: Normocephalic.   Mouth/Throat: No oral lesions (none noted).   Eyes: Lids are normal.   Neck: Trachea normal and normal range of motion.   Cardiovascular: Normal rate, regular rhythm, S1 normal, S2 normal and normal heart sounds.   Pulmonary/Chest: Effort normal and breath sounds normal.   Abdominal: Soft. Normal appearance and bowel sounds are normal.   Musculoskeletal: Normal range of motion.   Neurological: She is alert and oriented to person, place, and time. Coordination and gait normal.   Skin: Skin is intact. No pallor.   Psychiatric: She has a normal mood and affect. Her speech is normal and behavior is normal. Judgment and thought content normal. Cognition and memory are  normal.     Significant Labs:   CBC:   Recent Labs   Lab 10/18/18  0414 10/19/18  0402   WBC 3.93 2.70*   HGB 7.4* 7.6*   HCT 22.0* 22.0*   PLT 73* 74*    and CMP:   Recent Labs   Lab 10/18/18  0414 10/19/18  0402    139   K 4.4 4.5    109   CO2 24 26   * 114*   BUN 20 21   CREATININE 0.8 0.7   CALCIUM 10.1 9.9   PROT 5.8* 5.7*   ALBUMIN 3.1* 3.1*   BILITOT 0.4 0.5   ALKPHOS 121 111   AST 24 19   ALT 25 19   ANIONGAP 7* 4*   EGFRNONAA >60.0 >60.0     Diagnostic Results:  I have reviewed all pertinent imaging results/findings within the past 24 hours.

## 2018-10-19 NOTE — PLAN OF CARE
MDR's with Dr Tong.  Patient is tolerating chemo without complication.  She is scheduled to complete chemo and d/c home on Sunday if stable.  Patient will have twice weekly labs at her home clinic.  Clinic f/u made and patient is aware.  No d/c needs anticipated.      Future Appointments   Date Time Provider Department Center   11/1/2018  8:00 AM LAB, HEMONC SAME DAY [facility]   11/1/2018  9:00 AM Laquita Troy MD [facility] HEM ONC [facility]   11/1/2018 10:00 AM [facility], CHEMO [facility] CHE[facility]     Follow-up Information     Please follow up.    Why:  Labs- 2 x/ week as advised  Contact information:  [facility]  Address: [address] [place] [address], [place] [address]  Phone: [phone]                     10/19/18 1202   Final Note   Assessment Type Final Discharge Note   Discharge Disposition Home   What phone number can be called within the next 1-3 days to see how you are doing after discharge? ([phone])   Hospital Follow Up  Appt(s) scheduled? Yes   Discharge plans and expectations educations in teach back method with documentation complete? Yes

## 2018-10-19 NOTE — PROGRESS NOTES
[facility]  Hematology  Bone Marrow Transplant  Progress Note    Patient Name: Sabrina Mac  Admission Date: 10/16/2018  Hospital Length of Stay: 3 days  Code Status: Full Code    Subjective:     Interval History: Patient starts day 4 of HIDAC this evening. Will complete chemo Sunday morning. No acute events over night. States appetite is okay.     Objective:     Vital Signs (Most Recent):  Temp: 98.8 °F (37.1 °C) (10/19/18 0400)  Pulse: (!) 57 (10/19/18 0400)  Resp: 18 (10/19/18 0400)  BP: 137/64 (10/19/18 0400)  SpO2: 98 % (10/19/18 0400) Vital Signs (24h Range):  Temp:  [97.5 °F (36.4 °C)-98.8 °F (37.1 °C)] 98.8 °F (37.1 °C)  Pulse:  [57-62] 57  Resp:  [18-20] 18  SpO2:  [96 %-98 %] 98 %  BP: (115-140)/(58-67) 137/64     Weight: 64.4 kg (141 lb 13.9 oz)  Body mass index is 25.13 kg/m².  Body surface area is 1.69 meters squared.    Intake/Output - Last 3 Shifts       10/17 0700 - 10/18 0659 10/18 0700 - 10/19 0659 10/19 0700 - 10/20 0659    P.O. 960 1085     I.V. (mL/kg) 1186.7 (18.4) 1139.8 (17.7)     IV Piggyback 250 250     Total Intake(mL/kg) 2396.7 (37.2) 2474.8 (38.5)     Urine (mL/kg/hr) 1400 (0.9) 2250 (1.5)     Total Output 1400 2250     Net +996.7 +224.8            Urine Occurrence 3 x      Stool Occurrence 1 x          Physical Exam   Constitutional: She is oriented to person, place, and time. Vital signs are normal. She appears well-developed and well-nourished. She is cooperative.   HENT:   Head: Normocephalic.   Mouth/Throat: No oral lesions (none noted).   Eyes: Lids are normal.   Neck: Trachea normal and normal range of motion.   Cardiovascular: Normal rate, regular rhythm, S1 normal, S2 normal and normal heart sounds.   Pulmonary/Chest: Effort normal and breath sounds normal.   Abdominal: Soft. Normal appearance and bowel sounds are normal.   Musculoskeletal: Normal range of motion.   Neurological: She is alert and oriented to person, place, and time. Coordination and gait  normal.   Skin: Skin is intact. No pallor.   Psychiatric: She has a normal mood and affect. Her speech is normal and behavior is normal. Judgment and thought content normal. Cognition and memory are normal.     Significant Labs:   CBC:   Recent Labs   Lab 10/18/18  0414 10/19/18  0402   WBC 3.93 2.70*   HGB 7.4* 7.6*   HCT 22.0* 22.0*   PLT 73* 74*    and CMP:   Recent Labs   Lab 10/18/18  0414 10/19/18  0402    139   K 4.4 4.5    109   CO2 24 26   * 114*   BUN 20 21   CREATININE 0.8 0.7   CALCIUM 10.1 9.9   PROT 5.8* 5.7*   ALBUMIN 3.1* 3.1*   BILITOT 0.4 0.5   ALKPHOS 121 111   AST 24 19   ALT 25 19   ANIONGAP 7* 4*   EGFRNONAA >60.0 >60.0     Diagnostic Results:  I have reviewed all pertinent imaging results/findings within the past 24 hours.    Assessment/Plan:     * AML (acute myeloblastic leukemia)    - Admitted from [facility] in past for new AML; NPM1 +, CEBPA (-), FLT3 (+)  - Received 7+3 and then FLAG JENNIFER for induction and 2nd induction due to residual disease. Recovery marrow without disease  - Hand rash thought to be leukemia cutis resolved, skin bx did not show cutis   - On midostaurin off and on due to side effects (elevated LFTs) and abnormal cardiac echo; plan upon discharge is attempting to retrial midostaurin per Dr. Troy  - HIDAC #1 on 8/14/18, HIDAC #2 on 9/11/18, HIDAC #3 delayed due to pancytopenia and now on 10/16/18. Tonight will be day 4 of cycle 3.  - Was deemed okay to proceed with therapy with platelet count of 80k, as risk out weighs benefit of further treatment delay per primary oncologist Dr. Troy  - Will d/c Sunday if stable after chemo with dex eye drops and antimicrobial ppx, and dose reduced midostaurin (on day 8)     Pancytopenia due to chemotherapy    - transfuse for hgb <7 and plt <10K  - WBC 2.7, hgb 7.6, plt 74K  - stop ppx lovenox if plt <50K  - d/c on antimicrobial ppx     Electrolyte abnormality    - replace per BMT electrolyte protocol should  electrolyte become abnormal  - electrolytes normal today       CINV (chemotherapy-induced nausea and vomiting)    - continue prn antiemetics from home at this time        History of ventricular fibrillation    - continue home metoprolol 12.5 mg at 4:30 pm daily per pt request        Mucositis due to chemotherapy    - history of after chemo  - will continue home dukes solution     -no mucocitis noted at this time.     Elevated LFTs    - history of elevated LFTs 2/2 midostaurin  - intermittently holding midostaurin  - plan to retrial after this cycle per Dr. Troy at day 8 with reduced dose  - LFTs normal at this time       Chemotherapy induced cardiomyopathy    - post induction, now resolved  - last echo with improved EF        Depression with anxiety    - continue home lexapro  - continue home xanax and ativan prn            VTE Risk Mitigation (From admission, onward)        Ordered     enoxaparin injection 40 mg  Daily      10/16/18 1403     heparin, porcine (PF) 100 unit/mL injection flush 300 Units  As needed (PRN)      10/16/18 1610     IP VTE HIGH RISK PATIENT  Once      10/16/18 1403          Disposition: Patient to be discharged home on Sunday upon completion of HIDAC if stable.     Patty Quintana, NP  Bone Marrow Transplant  [facility]

## 2018-10-19 NOTE — PLAN OF CARE
Problem: Patient Care Overview  Goal: Plan of Care Review  Outcome: Ongoing (interventions implemented as appropriate)  Pt ambulates independently and remained free from falls and injury. Pt completed day 3 of HiDac. PIV intact, positive blood return noted. Pt had complaint of headache post chemo.  Bed in lowest position, nonskid footwear on pt when out of bed, call light and possessions within reach, side rails up x2. Pt voices understanding to call for assistance. Will continue to monitor.

## 2018-10-19 NOTE — ASSESSMENT & PLAN NOTE
- history of after chemo  - will continue home dukes solution     -no mucocitis noted at this time.

## 2018-10-19 NOTE — ASSESSMENT & PLAN NOTE
- Admitted from [facility] in past for new AML; NPM1 +, CEBPA (-), FLT3 (+)  - Received 7+3 and then FLAG JENNIFER for induction and 2nd induction due to residual disease. Recovery marrow without disease  - Hand rash thought to be leukemia cutis resolved, skin bx did not show cutis   - On midostaurin off and on due to side effects (elevated LFTs) and abnormal cardiac echo; plan upon discharge is attempting to retrial midostaurin per Dr. Troy  - HIDAC #1 on 8/14/18, HIDAC #2 on 9/11/18, HIDAC #3 delayed due to pancytopenia and now on 10/16/18. Tonight will be day 4 of cycle 3.  - Was deemed okay to proceed with therapy with platelet count of 80k, as risk out weighs benefit of further treatment delay per primary oncologist Dr. Troy  - Will d/c Sunday if stable after chemo with dex eye drops and antimicrobial ppx, and dose reduced midostaurin (on day 8)

## 2018-10-19 NOTE — PLAN OF CARE
Problem: Patient Care Overview  Goal: Plan of Care Review  Outcome: Ongoing (interventions implemented as appropriate)  Pt to receive dose #4/ of 6 dose of high dose cytarabine this morning. Continued on IVF. No complaints.

## 2018-10-20 LAB
ABO + RH BLD: NORMAL
ALBUMIN SERPL BCP-MCNC: 3.2 G/DL
ALP SERPL-CCNC: 97 U/L
ALT SERPL W/O P-5'-P-CCNC: 26 U/L
ANION GAP SERPL CALC-SCNC: 7 MMOL/L
AST SERPL-CCNC: 25 U/L
BASOPHILS # BLD AUTO: 0 K/UL
BASOPHILS NFR BLD: 0 %
BILIRUB SERPL-MCNC: 0.6 MG/DL
BLD GP AB SCN CELLS X3 SERPL QL: NORMAL
BUN SERPL-MCNC: 21 MG/DL
CALCIUM SERPL-MCNC: 9.8 MG/DL
CHLORIDE SERPL-SCNC: 108 MMOL/L
CO2 SERPL-SCNC: 24 MMOL/L
CREAT SERPL-MCNC: 0.7 MG/DL
DIFFERENTIAL METHOD: ABNORMAL
EOSINOPHIL # BLD AUTO: 0 K/UL
EOSINOPHIL NFR BLD: 0 %
ERYTHROCYTE [DISTWIDTH] IN BLOOD BY AUTOMATED COUNT: 14.3 %
EST. GFR  (AFRICAN AMERICAN): >60 ML/MIN/1.73 M^2
EST. GFR  (NON AFRICAN AMERICAN): >60 ML/MIN/1.73 M^2
GLUCOSE SERPL-MCNC: 96 MG/DL
HCT VFR BLD AUTO: 22.3 %
HGB BLD-MCNC: 7.5 G/DL
IMM GRANULOCYTES # BLD AUTO: 0.01 K/UL
IMM GRANULOCYTES NFR BLD AUTO: 0.4 %
LYMPHOCYTES # BLD AUTO: 0 K/UL
LYMPHOCYTES NFR BLD: 0.4 %
MAGNESIUM SERPL-MCNC: 1.9 MG/DL
MCH RBC QN AUTO: 30.6 PG
MCHC RBC AUTO-ENTMCNC: 33.6 G/DL
MCV RBC AUTO: 91 FL
MONOCYTES # BLD AUTO: 0 K/UL
MONOCYTES NFR BLD: 1.3 %
NEUTROPHILS # BLD AUTO: 2.2 K/UL
NEUTROPHILS NFR BLD: 97.9 %
NRBC BLD-RTO: 0 /100 WBC
PHOSPHATE SERPL-MCNC: 3.3 MG/DL
PLATELET # BLD AUTO: 86 K/UL
PMV BLD AUTO: 10.5 FL
POTASSIUM SERPL-SCNC: 3.8 MMOL/L
PROT SERPL-MCNC: 5.9 G/DL
RBC # BLD AUTO: 2.45 M/UL
SODIUM SERPL-SCNC: 139 MMOL/L
WBC # BLD AUTO: 2.26 K/UL

## 2018-10-20 PROCEDURE — 25000003 PHARM REV CODE 250: Performed by: NURSE PRACTITIONER

## 2018-10-20 PROCEDURE — 85025 COMPLETE CBC W/AUTO DIFF WBC: CPT

## 2018-10-20 PROCEDURE — 20600001 HC STEP DOWN PRIVATE ROOM

## 2018-10-20 PROCEDURE — 25000003 PHARM REV CODE 250: Performed by: STUDENT IN AN ORGANIZED HEALTH CARE EDUCATION/TRAINING PROGRAM

## 2018-10-20 PROCEDURE — 36415 COLL VENOUS BLD VENIPUNCTURE: CPT

## 2018-10-20 PROCEDURE — 63600175 PHARM REV CODE 636 W HCPCS: Performed by: INTERNAL MEDICINE

## 2018-10-20 PROCEDURE — 86920 COMPATIBILITY TEST SPIN: CPT

## 2018-10-20 PROCEDURE — 83735 ASSAY OF MAGNESIUM: CPT

## 2018-10-20 PROCEDURE — 80053 COMPREHEN METABOLIC PANEL: CPT

## 2018-10-20 PROCEDURE — 63600175 PHARM REV CODE 636 W HCPCS: Performed by: NURSE PRACTITIONER

## 2018-10-20 PROCEDURE — 86850 RBC ANTIBODY SCREEN: CPT

## 2018-10-20 PROCEDURE — 99233 SBSQ HOSP IP/OBS HIGH 50: CPT | Mod: ,,, | Performed by: INTERNAL MEDICINE

## 2018-10-20 PROCEDURE — 25000003 PHARM REV CODE 250: Performed by: INTERNAL MEDICINE

## 2018-10-20 PROCEDURE — 84100 ASSAY OF PHOSPHORUS: CPT

## 2018-10-20 RX ORDER — HYDROCODONE BITARTRATE AND ACETAMINOPHEN 500; 5 MG/1; MG/1
TABLET ORAL
Status: DISCONTINUED | OUTPATIENT
Start: 2018-10-20 | End: 2018-10-21 | Stop reason: HOSPADM

## 2018-10-20 RX ADMIN — DEXAMETHASONE 1 DROP: 1 SUSPENSION OPHTHALMIC at 05:10

## 2018-10-20 RX ADMIN — ESCITALOPRAM OXALATE 10 MG: 10 TABLET ORAL at 08:10

## 2018-10-20 RX ADMIN — ACYCLOVIR 400 MG: 200 CAPSULE ORAL at 08:10

## 2018-10-20 RX ADMIN — DEXAMETHASONE 8 MG: 4 TABLET ORAL at 04:10

## 2018-10-20 RX ADMIN — DEXAMETHASONE 1 DROP: 1 SUSPENSION OPHTHALMIC at 11:10

## 2018-10-20 RX ADMIN — METOPROLOL SUCCINATE 12.5 MG: 25 TABLET, EXTENDED RELEASE ORAL at 04:10

## 2018-10-20 RX ADMIN — CYTARABINE 5040 MG: 100 INJECTION, SOLUTION INTRATHECAL; INTRAVENOUS; SUBCUTANEOUS at 08:10

## 2018-10-20 RX ADMIN — ENOXAPARIN SODIUM 40 MG: 100 INJECTION SUBCUTANEOUS at 04:10

## 2018-10-20 RX ADMIN — ACETAMINOPHEN 650 MG: 325 TABLET, FILM COATED ORAL at 08:10

## 2018-10-20 RX ADMIN — PANTOPRAZOLE SODIUM 40 MG: 40 TABLET, DELAYED RELEASE ORAL at 08:10

## 2018-10-20 RX ADMIN — ONDANSETRON 8 MG: 4 TABLET, FILM COATED ORAL at 04:10

## 2018-10-20 NOTE — PROGRESS NOTES
[facility]  Hematology  Bone Marrow Transplant  Progress Note    Patient Name: Sabrina Mac  Admission Date: 10/16/2018  Hospital Length of Stay: 4 days  Code Status: Full Code    Subjective:     Interval History: no issues overnight. Patient starts day 5 of HIDACtoday. Will complete chemo Sunday morning and likely discharge home. States appetite is okay. Reports right jaw pain that responded to Tylenol.     Objective:     Vital Signs (Most Recent):  Temp: 98 °F (36.7 °C) (10/20/18 0757)  Pulse: (!) 54 (10/20/18 0757)  Resp: 18 (10/20/18 0757)  BP: 139/63 (10/20/18 0757)  SpO2: 98 % (10/20/18 0757) Vital Signs (24h Range):  Temp:  [97.3 °F (36.3 °C)-98.7 °F (37.1 °C)] 98 °F (36.7 °C)  Pulse:  [54-66] 54  Resp:  [16-18] 18  SpO2:  [97 %-99 %] 98 %  BP: (126-139)/(61-64) 139/63     Weight: 63.1 kg (139 lb 1.8 oz)  Body mass index is 24.64 kg/m².  Body surface area is 1.67 meters squared.    Intake/Output - Last 3 Shifts       10/18 0700 - 10/19 0659 10/19 0700 - 10/20 0659 10/20 0700 - 10/21 0659    P.O. 1085      I.V. (mL/kg) 1139.8 (17.7) 1189.3 (18.8)     IV Piggyback 250      Total Intake(mL/kg) 2474.8 (38.5) 1189.3 (18.8)     Urine (mL/kg/hr) 2250 (1.5) 2400 (1.6)     Total Output 2250 2400     Net +224.8 -1210.7                Physical Exam   Constitutional: She is oriented to person, place, and time. Vital signs are normal. She appears well-developed and well-nourished. She is cooperative.   HENT:   Head: Normocephalic.   Mouth/Throat: No oral lesions (none noted).   Eyes: Lids are normal.   Neck: Trachea normal and normal range of motion.   Cardiovascular: Normal rate, regular rhythm, S1 normal, S2 normal and normal heart sounds.   Pulmonary/Chest: Effort normal and breath sounds normal.   Abdominal: Soft. Normal appearance and bowel sounds are normal.   Musculoskeletal: Normal range of motion.   Neurological: She is alert and oriented to person, place, and time. Coordination and gait  normal.   Skin: Skin is intact. No pallor.   Psychiatric: She has a normal mood and affect. Her speech is normal and behavior is normal. Judgment and thought content normal. Cognition and memory are normal.     Significant Labs:   CBC:   Recent Labs   Lab 10/19/18  0402 10/20/18  0436   WBC 2.70* 2.26*   HGB 7.6* 7.5*   HCT 22.0* 22.3*   PLT 74* 86*    and CMP:   Recent Labs   Lab 10/19/18  0402 10/20/18  0436    139   K 4.5 3.8    108   CO2 26 24   * 96   BUN 21 21   CREATININE 0.7 0.7   CALCIUM 9.9 9.8   PROT 5.7* 5.9*   ALBUMIN 3.1* 3.2*   BILITOT 0.5 0.6   ALKPHOS 111 97   AST 19 25   ALT 19 26   ANIONGAP 4* 7*   EGFRNONAA >60.0 >60.0     Diagnostic Results:  I have reviewed all pertinent imaging results/findings within the past 24 hours.    Assessment/Plan:     * AML (acute myeloblastic leukemia)    - Admitted from [facility] in past for new AML; NPM1 +, CEBPA (-), FLT3 (+)  - Received 7+3 and then FLAG JENNIFER for induction and 2nd induction due to residual disease. Recovery marrow without disease  - Hand rash thought to be leukemia cutis resolved, skin bx did not show cutis   - On midostaurin off and on due to side effects (elevated LFTs) and abnormal cardiac echo; plan upon discharge is attempting to retrial midostaurin per Dr. Troy  - HIDAC #1 on 8/14/18, HIDAC #2 on 9/11/18, HIDAC #3 delayed due to pancytopenia and now on 10/16/18. Tonight will be day 5 of cycle 3.  - Was deemed okay to proceed with therapy with platelet count of 80k, as risk out weighs benefit of further treatment delay per primary oncologist Dr. Troy  - Will d/c Sunday if stable after chemo with dex eye drops and antimicrobial ppx, and dose reduced midostaurin (on day 8)     History of ventricular fibrillation    - continue home metoprolol 12.5 mg at 4:30 pm daily per pt request        Mucositis due to chemotherapy    - history of after chemo  - will continue home dukes solution     -no mucocitis noted at this  time.     Elevated LFTs    - history of elevated LFTs 2/2 midostaurin  - intermittently holding midostaurin  - plan to retrial after this cycle per Dr. Troy at day 8 with reduced dose  - LFTs normal at this time       Chemotherapy induced cardiomyopathy    - post induction, now resolved  - last echo with improved EF        CINV (chemotherapy-induced nausea and vomiting)    - continue prn antiemetics from home at this time        Electrolyte abnormality    - replace per BMT electrolyte protocol should electrolyte become abnormal  - electrolytes normal today       Pancytopenia due to chemotherapy    - transfuse for hgb <7 and plt <10K  - WBC 2.26, hgb 7.5, plt 86K  - stop ppx lovenox if plt <50K  - d/c on antimicrobial ppx     Depression with anxiety    - continue home lexapro  - continue home xanax and ativan prn            VTE Risk Mitigation (From admission, onward)        Ordered     enoxaparin injection 40 mg  Daily      10/16/18 1403     heparin, porcine (PF) 100 unit/mL injection flush 300 Units  As needed (PRN)      10/16/18 1610     IP VTE HIGH RISK PATIENT  Once      10/16/18 1403          Disposition: possible d/c home tomorrow after chemo    Moriah Guillen MD  Bone Marrow Transplant  [facility]

## 2018-10-20 NOTE — PLAN OF CARE
Problem: Patient Care Overview  Goal: Plan of Care Review  Outcome: Ongoing (interventions implemented as appropriate)  Pt on day 4 of HIDAC. No chemotherapy this evening. Continued on IVF. Denies any pain. Up to BSC.

## 2018-10-20 NOTE — ASSESSMENT & PLAN NOTE
- transfuse for hgb <7 and plt <10K  - WBC 2.26, hgb 7.5, plt 86K  - stop ppx lovenox if plt <50K  - d/c on antimicrobial ppx

## 2018-10-20 NOTE — ASSESSMENT & PLAN NOTE
- Admitted from [facility] in past for new AML; NPM1 +, CEBPA (-), FLT3 (+)  - Received 7+3 and then FLAG JENNIFER for induction and 2nd induction due to residual disease. Recovery marrow without disease  - Hand rash thought to be leukemia cutis resolved, skin bx did not show cutis   - On midostaurin off and on due to side effects (elevated LFTs) and abnormal cardiac echo; plan upon discharge is attempting to retrial midostaurin per Dr. Troy  - HIDAC #1 on 8/14/18, HIDAC #2 on 9/11/18, HIDAC #3 delayed due to pancytopenia and now on 10/16/18. Tonight will be day 5 of cycle 3.  - Was deemed okay to proceed with therapy with platelet count of 80k, as risk out weighs benefit of further treatment delay per primary oncologist Dr. Troy  - Will d/c Sunday if stable after chemo with dex eye drops and antimicrobial ppx, and dose reduced midostaurin (on day 8)

## 2018-10-20 NOTE — SUBJECTIVE & OBJECTIVE
Subjective:     Interval History: no issues overnight. Patient starts day 5 of HIDACtoday. Will complete chemo Sunday morning and likely discharge home. States appetite is okay. Reports right jaw pain that responded to Tylenol.     Objective:     Vital Signs (Most Recent):  Temp: 98 °F (36.7 °C) (10/20/18 0757)  Pulse: (!) 54 (10/20/18 0757)  Resp: 18 (10/20/18 0757)  BP: 139/63 (10/20/18 0757)  SpO2: 98 % (10/20/18 0757) Vital Signs (24h Range):  Temp:  [97.3 °F (36.3 °C)-98.7 °F (37.1 °C)] 98 °F (36.7 °C)  Pulse:  [54-66] 54  Resp:  [16-18] 18  SpO2:  [97 %-99 %] 98 %  BP: (126-139)/(61-64) 139/63     Weight: 63.1 kg (139 lb 1.8 oz)  Body mass index is 24.64 kg/m².  Body surface area is 1.67 meters squared.    Intake/Output - Last 3 Shifts       10/18 0700 - 10/19 0659 10/19 0700 - 10/20 0659 10/20 0700 - 10/21 0659    P.O. 1085      I.V. (mL/kg) 1139.8 (17.7) 1189.3 (18.8)     IV Piggyback 250      Total Intake(mL/kg) 2474.8 (38.5) 1189.3 (18.8)     Urine (mL/kg/hr) 2250 (1.5) 2400 (1.6)     Total Output 2250 2400     Net +224.8 -1210.7                Physical Exam   Constitutional: She is oriented to person, place, and time. Vital signs are normal. She appears well-developed and well-nourished. She is cooperative.   HENT:   Head: Normocephalic.   Mouth/Throat: No oral lesions (none noted).   Eyes: Lids are normal.   Neck: Trachea normal and normal range of motion.   Cardiovascular: Normal rate, regular rhythm, S1 normal, S2 normal and normal heart sounds.   Pulmonary/Chest: Effort normal and breath sounds normal.   Abdominal: Soft. Normal appearance and bowel sounds are normal.   Musculoskeletal: Normal range of motion.   Neurological: She is alert and oriented to person, place, and time. Coordination and gait normal.   Skin: Skin is intact. No pallor.   Psychiatric: She has a normal mood and affect. Her speech is normal and behavior is normal. Judgment and thought content normal. Cognition and memory are  normal.     Significant Labs:   CBC:   Recent Labs   Lab 10/19/18  0402 10/20/18  0436   WBC 2.70* 2.26*   HGB 7.6* 7.5*   HCT 22.0* 22.3*   PLT 74* 86*    and CMP:   Recent Labs   Lab 10/19/18  0402 10/20/18  0436    139   K 4.5 3.8    108   CO2 26 24   * 96   BUN 21 21   CREATININE 0.7 0.7   CALCIUM 9.9 9.8   PROT 5.7* 5.9*   ALBUMIN 3.1* 3.2*   BILITOT 0.5 0.6   ALKPHOS 111 97   AST 19 25   ALT 19 26   ANIONGAP 4* 7*   EGFRNONAA >60.0 >60.0     Diagnostic Results:  I have reviewed all pertinent imaging results/findings within the past 24 hours.

## 2018-10-21 VITALS
BODY MASS INDEX: 24.61 KG/M2 | HEIGHT: 63 IN | TEMPERATURE: 98 F | DIASTOLIC BLOOD PRESSURE: 73 MMHG | HEART RATE: 82 BPM | SYSTOLIC BLOOD PRESSURE: 141 MMHG | OXYGEN SATURATION: 98 % | WEIGHT: 138.88 LBS | RESPIRATION RATE: 18 BRPM

## 2018-10-21 LAB
ALBUMIN SERPL BCP-MCNC: 3.2 G/DL
ALP SERPL-CCNC: 92 U/L
ALT SERPL W/O P-5'-P-CCNC: 32 U/L
ANION GAP SERPL CALC-SCNC: 10 MMOL/L
ANISOCYTOSIS BLD QL SMEAR: ABNORMAL
AST SERPL-CCNC: 35 U/L
BASOPHILS NFR BLD: 0 %
BILIRUB SERPL-MCNC: 1 MG/DL
BLD PROD TYP BPU: NORMAL
BLOOD UNIT EXPIRATION DATE: NORMAL
BLOOD UNIT TYPE CODE: 6200
BLOOD UNIT TYPE: NORMAL
BUN SERPL-MCNC: 17 MG/DL
CALCIUM SERPL-MCNC: 9.9 MG/DL
CHLORIDE SERPL-SCNC: 108 MMOL/L
CO2 SERPL-SCNC: 21 MMOL/L
CODING SYSTEM: NORMAL
CREAT SERPL-MCNC: 0.7 MG/DL
DIFFERENTIAL METHOD: ABNORMAL
DISPENSE STATUS: NORMAL
EOSINOPHIL NFR BLD: 0 %
ERYTHROCYTE [DISTWIDTH] IN BLOOD BY AUTOMATED COUNT: 13.7 %
EST. GFR  (AFRICAN AMERICAN): >60 ML/MIN/1.73 M^2
EST. GFR  (NON AFRICAN AMERICAN): >60 ML/MIN/1.73 M^2
GLUCOSE SERPL-MCNC: 141 MG/DL
HCT VFR BLD AUTO: 25.7 %
HGB BLD-MCNC: 9.3 G/DL
IMM GRANULOCYTES # BLD AUTO: ABNORMAL K/UL
IMM GRANULOCYTES NFR BLD AUTO: ABNORMAL %
LYMPHOCYTES NFR BLD: 0 %
MAGNESIUM SERPL-MCNC: 1.7 MG/DL
MCH RBC QN AUTO: 31.3 PG
MCHC RBC AUTO-ENTMCNC: 36.2 G/DL
MCV RBC AUTO: 87 FL
MONOCYTES NFR BLD: 0 %
NEUTROPHILS NFR BLD: 100 %
NRBC BLD-RTO: 0 /100 WBC
NUM UNITS TRANS PACKED RBC: NORMAL
OVALOCYTES BLD QL SMEAR: ABNORMAL
PHOSPHATE SERPL-MCNC: 3.6 MG/DL
PLATELET # BLD AUTO: 70 K/UL
PLATELET BLD QL SMEAR: ABNORMAL
PMV BLD AUTO: 10.6 FL
POIKILOCYTOSIS BLD QL SMEAR: SLIGHT
POTASSIUM SERPL-SCNC: 3.7 MMOL/L
PROT SERPL-MCNC: 5.9 G/DL
RBC # BLD AUTO: 2.97 M/UL
SODIUM SERPL-SCNC: 139 MMOL/L
WBC # BLD AUTO: 1.85 K/UL

## 2018-10-21 PROCEDURE — 84100 ASSAY OF PHOSPHORUS: CPT

## 2018-10-21 PROCEDURE — 25000003 PHARM REV CODE 250: Performed by: INTERNAL MEDICINE

## 2018-10-21 PROCEDURE — 85027 COMPLETE CBC AUTOMATED: CPT

## 2018-10-21 PROCEDURE — 85007 BL SMEAR W/DIFF WBC COUNT: CPT

## 2018-10-21 PROCEDURE — 86644 CMV ANTIBODY: CPT

## 2018-10-21 PROCEDURE — 25000003 PHARM REV CODE 250: Performed by: STUDENT IN AN ORGANIZED HEALTH CARE EDUCATION/TRAINING PROGRAM

## 2018-10-21 PROCEDURE — 36415 COLL VENOUS BLD VENIPUNCTURE: CPT

## 2018-10-21 PROCEDURE — 83735 ASSAY OF MAGNESIUM: CPT

## 2018-10-21 PROCEDURE — 99233 SBSQ HOSP IP/OBS HIGH 50: CPT | Mod: ,,, | Performed by: INTERNAL MEDICINE

## 2018-10-21 PROCEDURE — P9040 RBC LEUKOREDUCED IRRADIATED: HCPCS

## 2018-10-21 PROCEDURE — 36430 TRANSFUSION BLD/BLD COMPNT: CPT

## 2018-10-21 PROCEDURE — 63600175 PHARM REV CODE 636 W HCPCS: Performed by: INTERNAL MEDICINE

## 2018-10-21 PROCEDURE — 25000003 PHARM REV CODE 250: Performed by: NURSE PRACTITIONER

## 2018-10-21 PROCEDURE — 80053 COMPREHEN METABOLIC PANEL: CPT

## 2018-10-21 RX ORDER — DIPHENHYDRAMINE HCL 25 MG
25 CAPSULE ORAL EVERY 6 HOURS PRN
Status: DISCONTINUED | OUTPATIENT
Start: 2018-10-21 | End: 2018-10-21 | Stop reason: HOSPADM

## 2018-10-21 RX ADMIN — PANTOPRAZOLE SODIUM 40 MG: 40 TABLET, DELAYED RELEASE ORAL at 08:10

## 2018-10-21 RX ADMIN — DEXAMETHASONE 1 DROP: 1 SUSPENSION OPHTHALMIC at 12:10

## 2018-10-21 RX ADMIN — ONDANSETRON 8 MG: 4 TABLET, FILM COATED ORAL at 04:10

## 2018-10-21 RX ADMIN — ESCITALOPRAM OXALATE 10 MG: 10 TABLET ORAL at 08:10

## 2018-10-21 RX ADMIN — DIPHENHYDRAMINE HYDROCHLORIDE 25 MG: 25 CAPSULE ORAL at 12:10

## 2018-10-21 RX ADMIN — CYTARABINE 5040 MG: 100 INJECTION, SOLUTION INTRATHECAL; INTRAVENOUS; SUBCUTANEOUS at 09:10

## 2018-10-21 RX ADMIN — ACYCLOVIR 400 MG: 200 CAPSULE ORAL at 08:10

## 2018-10-21 RX ADMIN — ALPRAZOLAM 0.25 MG: 0.25 TABLET ORAL at 09:10

## 2018-10-21 RX ADMIN — DEXAMETHASONE 8 MG: 4 TABLET ORAL at 04:10

## 2018-10-21 RX ADMIN — DEXAMETHASONE 1 DROP: 1 SUSPENSION OPHTHALMIC at 05:10

## 2018-10-21 RX ADMIN — ACETAMINOPHEN 650 MG: 325 TABLET, FILM COATED ORAL at 12:10

## 2018-10-21 NOTE — DISCHARGE SUMMARY
[facility]  Hematology  Bone Marrow Transplant  Discharge Summary      Patient Name: Sabrina Mac  MRN: [id number]  Admission Date: 10/16/2018  Hospital Length of Stay: 5 days  Discharge Date and Time:  10/21/2018 11:17 AM  Attending Physician: Laquita Troy MD   Discharging Provider: Moriah Guillen MD  Primary Care Provider: Primary Doctor No    HPI: 68 y.o. female with hx of AML here for cycle #3 of IDAC consolidation. Cycle #3 was delayed by x1 week due to pancytopenia. Counts are now improved and safe for admission. Pt was seen in clinic by Dr. Troy today without complaints.    * No surgery found *     Hospital Course:  On 10/16/2018, Admitted from BMT clinic for cycle #3 of HIDAC. Tolerated chemo well with no side effects. Denies nausea, vomiting, abdominal pain, diarrhea, mucositis or visual disturbance. She had a prescription for Midostaurin to star take it on day 8 of chemo which is Tuesday 10/23/18 for a total of 14 days. She received blood transfusion during this admission.     Significant Diagnostic Studies: Labs:   CMP   Recent Labs   Lab 10/20/18  0436 10/21/18  0813    139   K 3.8 3.7    108   CO2 24 21*   GLU 96 141*   BUN 21 17   CREATININE 0.7 0.7   CALCIUM 9.8 9.9   PROT 5.9* 5.9*   ALBUMIN 3.2* 3.2*   BILITOT 0.6 1.0   ALKPHOS 97 92   AST 25 35   ALT 26 32   ANIONGAP 7* 10   ESTGFRAFRICA >60.0 >60.0   EGFRNONAA >60.0 >60.0    and CBC   Recent Labs   Lab 10/20/18  0436 10/21/18  0813   WBC 2.26* 1.85*   HGB 7.5* 9.3*   HCT 22.3* 25.7*   PLT 86* 70*       Pending Diagnostic Studies:     None        Final Active Diagnoses:    Diagnosis Date Noted POA    PRINCIPAL PROBLEM:  AML (acute myeloblastic leukemia) [C92.00] 08/14/2018 Yes    Mucositis due to chemotherapy [K12.31] 10/16/2018 Yes    History of ventricular fibrillation [Z86.79] 10/16/2018 Not Applicable    Elevated LFTs [R94.5] 08/19/2018 Yes    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, T45.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    Why:  Labs- 2 x/ week as advised  Contact information:  [facility]  Address: [address] [place] [address], [place] [address]  Phone: [phone]                  Patient Instructions:      Notify your health care provider if you experience any of the following:  temperature >100.4     Notify your health care provider if you experience any of the following:  persistent nausea and vomiting or diarrhea     Notify your health care provider if you experience any of the following:  severe uncontrolled pain     Notify your health care provider if you experience any of the following:  redness, tenderness, or signs of infection (pain, swelling, redness, odor or green/yellow discharge around incision site)     Activity as tolerated     Medications:  Reconciled Home Medications:      Medication List      START taking these medications    dexamethasone 0.1 % ophthalmic solution  Commonly known as:  DECADRON  Place 1 drop into both eyes every 6 (six) hours. Through 10/24/18. for 3 days        CHANGE how you take these medications    midostaurin 25 mg Cap  Commonly known as:  RYDAPT  Take 50 mg by mouth 2 (two) times daily on days 8 to 21 of cycle  What changed:  additional instructions        CONTINUE taking these medications    acyclovir 200 MG capsule  Commonly known as:  ZOVIRAX  Take 2 capsules (400 mg total) by mouth 2 (two) times daily.     ALPRAZolam 0.25 MG tablet  Commonly known as:  XANAX  Take 1 tablet (0.25 mg total) by mouth 3 (three) times daily as needed for Anxiety.     ciprofloxacin HCl 500 MG  tablet  Commonly known as:  CIPRO  Take 1 tablet (500 mg total) by mouth every 12 (twelve) hours.     DUKE'S SOLUTION  Take 10 mLs by mouth 4 (four) times daily.     escitalopram oxalate 10 MG tablet  Commonly known as:  LEXAPRO  Take 10 mg by mouth once daily.     fluconazole 200 MG Tab  Commonly known as:  DIFLUCAN  Take 2 tablets (400 mg total) by mouth once daily.     LORazepam 0.5 MG tablet  Commonly known as:  ATIVAN  Take 1 tablet (0.5 mg total) by mouth every 6 (six) hours as needed (nausea).     magnesium oxide 400 mg (241.3 mg magnesium) tablet  Commonly known as:  MAG-OX  Take 2 tablets (800 mg total) by mouth 2 (two) times daily.     metoprolol succinate 25 MG 24 hr tablet  Commonly known as:  TOPROL-XL  Take 0.5 tablets (12.5 mg total) by mouth once daily.     omeprazole 20 MG tablet  Commonly known as:  PRILOSEC OTC  Take 20 mg by mouth every morning.     ondansetron 8 MG tablet  Commonly known as:  ZOFRAN  Take 1 tablet (8 mg total) by mouth every 12 (twelve) hours as needed for Nausea.     prochlorperazine 10 MG tablet  Commonly known as:  COMPAZINE  Take 1 tablet (10 mg total) by mouth 4 (four) times daily.     vitamin D 1000 units Tab  Commonly known as:  VITAMIN D3  Take 1 tablet (1,000 Units total) by mouth once daily.            Moriah Guillen MD  Bone Marrow Transplant  [facility]

## 2018-10-21 NOTE — SUBJECTIVE & OBJECTIVE
Subjective:     Interval History: no issues overnight. Patient starts day 6  of HIDACtoday. Received 1 pRBC. Plan to finish chemo and discharge home today     Objective:     Vital Signs (Most Recent):  Temp: 98 °F (36.7 °C) (10/21/18 0727)  Pulse: (!) 55 (10/21/18 0727)  Resp: 16 (10/21/18 0727)  BP: (!) 158/77 (10/21/18 0727)  SpO2: 98 % (10/21/18 0727) Vital Signs (24h Range):  Temp:  [97 °F (36.1 °C)-98.8 °F (37.1 °C)] 98 °F (36.7 °C)  Pulse:  [55-71] 55  Resp:  [16-20] 16  SpO2:  [97 %-99 %] 98 %  BP: (111-158)/(54-77) 158/77     Weight: 63 kg (138 lb 14.2 oz)  Body mass index is 24.6 kg/m².  Body surface area is 1.67 meters squared.    Intake/Output - Last 3 Shifts       10/19 0700 - 10/20 0659 10/20 0700 - 10/21 0659 10/21 0700 - 10/22 0659    P.O.  1620     I.V. (mL/kg) 1189.3 (18.8) 893 (14.2)     IV Piggyback  250     Total Intake(mL/kg) 1189.3 (18.8) 2763 (43.9)     Urine (mL/kg/hr) 2400 (1.6) 1150 (0.8) 700 (5.7)    Total Output 2400 1150 700    Net -1210.7 +1613 -700               Physical Exam   Constitutional: She is oriented to person, place, and time. Vital signs are normal. She appears well-developed and well-nourished. She is cooperative.   HENT:   Head: Normocephalic.   Mouth/Throat: No oral lesions (none noted).   Eyes: Lids are normal.   Neck: Trachea normal and normal range of motion.   Cardiovascular: Normal rate, regular rhythm, S1 normal, S2 normal and normal heart sounds.   Pulmonary/Chest: Effort normal and breath sounds normal.   Abdominal: Soft. Normal appearance and bowel sounds are normal.   Musculoskeletal: Normal range of motion.   Neurological: She is alert and oriented to person, place, and time. Coordination and gait normal.   Skin: Skin is intact. No pallor.   Psychiatric: She has a normal mood and affect. Her speech is normal and behavior is normal. Judgment and thought content normal. Cognition and memory are normal.     Significant Labs:   CBC:   Recent Labs   Lab  10/20/18  0436 10/21/18  0813   WBC 2.26* 1.85*   HGB 7.5* 9.3*   HCT 22.3* 25.7*   PLT 86* 70*    and CMP:   Recent Labs   Lab 10/20/18  0436      K 3.8      CO2 24   GLU 96   BUN 21   CREATININE 0.7   CALCIUM 9.8   PROT 5.9*   ALBUMIN 3.2*   BILITOT 0.6   ALKPHOS 97   AST 25   ALT 26   ANIONGAP 7*   EGFRNONAA >60.0     Diagnostic Results:  I have reviewed all pertinent imaging results/findings within the past 24 hours.

## 2018-10-21 NOTE — ASSESSMENT & PLAN NOTE
- transfuse for hgb <7 and plt <10K  - WBC 1.8, hgb 9.3, plt 70K  - stop ppx lovenox if plt <50K  - d/c on antimicrobial ppx

## 2018-10-21 NOTE — PROGRESS NOTES
Chemo Note: Chemotherapy consent in chart. Chemo verified with [place] SANDERS RN.  Right hand 22g patent. cytarabine (PF) (CYTOSAR) 3,000 mg/m2 = 5,040 mg in sodium chloride 0.9% 250 mL started @ 83.3 mL/h to infuse over 3 hours. Chemo precautions in place. Will continue to monitor pt.

## 2018-10-21 NOTE — PROGRESS NOTES
Discharge instructions given and explained to pt.  Pt. verbalized understanding with no further questions.  Peripheral IV D/C'd with catheter tip intact.  VS WDL.  Patient is awaiting ride home by .      ROAD TEST  O=SpO2 98% on RA  A=Ambulating around room   D=IV D/C'd  T=Tolerating regular diet  E=Voids  S=Performs self care independently

## 2018-10-21 NOTE — PLAN OF CARE
Problem: Patient Care Overview  Goal: Plan of Care Review  Outcome: Ongoing (interventions implemented as appropriate)  Pt. On day 4/5 of HIDAC.  Pt. with nonskid footwear on with bed in lowest position and locked with bed rails up x2.  Pt. ambulates independently and instructed to call if assistance is needed.  Pt. with call light within reach.  Pt. Will receive 1 unit of PRBC's once chemotherapy is complete tonight.  Pt. with c/o jaw pain this morning with tylenol given.  Pt. is to complete last dose of cytarabine tomorrow morning then D/C.  Will continue to monitor pt.

## 2018-10-21 NOTE — PROGRESS NOTES
[facility]  Hematology  Bone Marrow Transplant  Progress Note    Patient Name: Sabrina Mac  Admission Date: 10/16/2018  Hospital Length of Stay: 5 days  Code Status: Full Code    Subjective:     Interval History: no issues overnight. Patient starts day 6  of HIDACtoday. Received 1 pRBC. Plan to finish chemo and discharge home today     Objective:     Vital Signs (Most Recent):  Temp: 98 °F (36.7 °C) (10/21/18 0727)  Pulse: (!) 55 (10/21/18 0727)  Resp: 16 (10/21/18 0727)  BP: (!) 158/77 (10/21/18 0727)  SpO2: 98 % (10/21/18 0727) Vital Signs (24h Range):  Temp:  [97 °F (36.1 °C)-98.8 °F (37.1 °C)] 98 °F (36.7 °C)  Pulse:  [55-71] 55  Resp:  [16-20] 16  SpO2:  [97 %-99 %] 98 %  BP: (111-158)/(54-77) 158/77     Weight: 63 kg (138 lb 14.2 oz)  Body mass index is 24.6 kg/m².  Body surface area is 1.67 meters squared.    Intake/Output - Last 3 Shifts       10/19 0700 - 10/20 0659 10/20 0700 - 10/21 0659 10/21 0700 - 10/22 0659    P.O.  1620     I.V. (mL/kg) 1189.3 (18.8) 893 (14.2)     IV Piggyback  250     Total Intake(mL/kg) 1189.3 (18.8) 2763 (43.9)     Urine (mL/kg/hr) 2400 (1.6) 1150 (0.8) 700 (5.7)    Total Output 2400 1150 700    Net -1210.7 +1613 -700               Physical Exam   Constitutional: She is oriented to person, place, and time. Vital signs are normal. She appears well-developed and well-nourished. She is cooperative.   HENT:   Head: Normocephalic.   Mouth/Throat: No oral lesions (none noted).   Eyes: Lids are normal.   Neck: Trachea normal and normal range of motion.   Cardiovascular: Normal rate, regular rhythm, S1 normal, S2 normal and normal heart sounds.   Pulmonary/Chest: Effort normal and breath sounds normal.   Abdominal: Soft. Normal appearance and bowel sounds are normal.   Musculoskeletal: Normal range of motion.   Neurological: She is alert and oriented to person, place, and time. Coordination and gait normal.   Skin: Skin is intact. No pallor.   Psychiatric: She  has a normal mood and affect. Her speech is normal and behavior is normal. Judgment and thought content normal. Cognition and memory are normal.     Significant Labs:   CBC:   Recent Labs   Lab 10/20/18  0436 10/21/18  0813   WBC 2.26* 1.85*   HGB 7.5* 9.3*   HCT 22.3* 25.7*   PLT 86* 70*    and CMP:   Recent Labs   Lab 10/20/18  0436      K 3.8      CO2 24   GLU 96   BUN 21   CREATININE 0.7   CALCIUM 9.8   PROT 5.9*   ALBUMIN 3.2*   BILITOT 0.6   ALKPHOS 97   AST 25   ALT 26   ANIONGAP 7*   EGFRNONAA >60.0     Diagnostic Results:  I have reviewed all pertinent imaging results/findings within the past 24 hours.    Assessment/Plan:     * AML (acute myeloblastic leukemia)    - Admitted from [facility] in past for new AML; NPM1 +, CEBPA (-), FLT3 (+)  - Received 7+3 and then FLAG JENNIFER for induction and 2nd induction due to residual disease. Recovery marrow without disease  - Hand rash thought to be leukemia cutis resolved, skin bx did not show cutis   - On midostaurin off and on due to side effects (elevated LFTs) and abnormal cardiac echo; plan upon discharge is attempting to retrial midostaurin per Dr. Troy  - HIDAC #1 on 8/14/18, HIDAC #2 on 9/11/18, HIDAC #3 delayed due to pancytopenia and now on 10/16/18. Tonight will be day 6 of cycle 3.  - Was deemed okay to proceed with therapy with platelet count of 80k, as risk out weighs benefit of further treatment delay per primary oncologist Dr. Troy  - plan to d/c home today if stable after chemo with dex eye drops and antimicrobial ppx, and dose reduced midostaurin (on day 8)     History of ventricular fibrillation    - continue home metoprolol 12.5 mg at 4:30 pm daily per pt request        Mucositis due to chemotherapy    - history of after chemo  - will continue home dukes solution     -no mucocitis noted at this time.     Elevated LFTs    - history of elevated LFTs 2/2 midostaurin  - intermittently holding midostaurin  - plan to retrial after this  cycle per Dr. Troy at day 8 with reduced dose  - LFTs normal at this time       Chemotherapy induced cardiomyopathy    - post induction, now resolved  - last echo with improved EF        CINV (chemotherapy-induced nausea and vomiting)    - continue prn antiemetics from home at this time        Electrolyte abnormality    - replace per BMT electrolyte protocol should electrolyte become abnormal  - electrolytes normal today       Pancytopenia due to chemotherapy    - transfuse for hgb <7 and plt <10K  - WBC 1.8, hgb 9.3, plt 70K  - stop ppx lovenox if plt <50K  - d/c on antimicrobial ppx     Depression with anxiety    - continue home lexapro  - continue home xanax and ativan prn            VTE Risk Mitigation (From admission, onward)        Ordered     enoxaparin injection 40 mg  Daily      10/16/18 1403     heparin, porcine (PF) 100 unit/mL injection flush 300 Units  As needed (PRN)      10/16/18 1610     IP VTE HIGH RISK PATIENT  Once      10/16/18 1403          Disposition: d/c home after chemo    Moriah Guillen MD  Bone Marrow Transplant  [facility]

## 2018-10-21 NOTE — ASSESSMENT & PLAN NOTE
- Admitted from [facility] in past for new AML; NPM1 +, CEBPA (-), FLT3 (+)  - Received 7+3 and then FLAG JENNIFER for induction and 2nd induction due to residual disease. Recovery marrow without disease  - Hand rash thought to be leukemia cutis resolved, skin bx did not show cutis   - On midostaurin off and on due to side effects (elevated LFTs) and abnormal cardiac echo; plan upon discharge is attempting to retrial midostaurin per Dr. Troy  - HIDAC #1 on 8/14/18, HIDAC #2 on 9/11/18, HIDAC #3 delayed due to pancytopenia and now on 10/16/18. Tonight will be day 6 of cycle 3.  - Was deemed okay to proceed with therapy with platelet count of 80k, as risk out weighs benefit of further treatment delay per primary oncologist Dr. Troy  - plan to d/c home today if stable after chemo with dex eye drops and antimicrobial ppx, and dose reduced midostaurin (on day 8)

## 2018-10-21 NOTE — PLAN OF CARE
Problem: Patient Care Overview  Goal: Plan of Care Review  Outcome: Ongoing (interventions implemented as appropriate)  Patient remains free from falls and injury this shift. Bed in low, locked position with call bell in reach. Patient encouraged to call for assistance when getting out of bed. Patient verbalized understanding. All belongings within reach. VS stable. Afebrile. No complaints thus far. Chemo infused overnight with no issues. 1u PRBC given- premed with tylenol and benedryl. will continue to monitor.

## 2018-10-21 NOTE — NURSING
Cytarabine started through PIV noted for having positive blood return over 3 hrs.  Chemotherapy dosage and BSA checked by two chemotherapy certified nurses prior to administration.  Chemotherapy education done prior to hanging of chemotherapy.  Chemotherapeutic precautions in place throughout therapy.  Will continue to monitor.

## 2018-10-25 ENCOUNTER — DOCUMENTATION ONLY (OUTPATIENT)
Dept: HEMATOLOGY/ONCOLOGY | Facility: CLINIC | Age: 68
End: 2018-10-25

## 2018-10-29 ENCOUNTER — TELEPHONE (OUTPATIENT)
Dept: HEMATOLOGY/ONCOLOGY | Facility: CLINIC | Age: 68
End: 2018-10-29

## 2018-10-29 NOTE — TELEPHONE ENCOUNTER
Returned call to lab there is nothing to result on this patient, and I was not able to reach anyone in the lab, call went to the main line.

## 2018-10-29 NOTE — TELEPHONE ENCOUNTER
----- Message from Nupur Toure sent at 10/29/2018  4:46 PM CDT -----  Contact: Lab  Lab called  I have a Critical  CalledBack#[phone]  Thank You  VEDA Toure

## 2018-10-30 ENCOUNTER — INFUSION (OUTPATIENT)
Dept: INFUSION THERAPY | Facility: HOSPITAL | Age: 68
End: 2018-10-30
Attending: INTERNAL MEDICINE
Payer: MEDICARE

## 2018-10-30 VITALS
RESPIRATION RATE: 18 BRPM | SYSTOLIC BLOOD PRESSURE: 129 MMHG | OXYGEN SATURATION: 99 % | DIASTOLIC BLOOD PRESSURE: 62 MMHG | TEMPERATURE: 98 F | HEART RATE: 74 BPM

## 2018-10-30 DIAGNOSIS — D69.6 THROMBOCYTOPENIA: ICD-10-CM

## 2018-10-30 DIAGNOSIS — D50.0 ANEMIA DUE TO CHRONIC BLOOD LOSS: Primary | ICD-10-CM

## 2018-10-30 DIAGNOSIS — D50.0 ANEMIA DUE TO CHRONIC BLOOD LOSS: ICD-10-CM

## 2018-10-30 LAB
BLD PROD TYP BPU: NORMAL
BLOOD UNIT EXPIRATION DATE: NORMAL
BLOOD UNIT TYPE CODE: 6200
BLOOD UNIT TYPE: NORMAL
CODING SYSTEM: NORMAL
DISPENSE STATUS: NORMAL
NUM UNITS TRANS WBC-POOR PLATPHERESIS: NORMAL

## 2018-10-30 PROCEDURE — P9037 PLATE PHERES LEUKOREDU IRRAD: HCPCS

## 2018-10-30 PROCEDURE — 25000003 PHARM REV CODE 250: Performed by: INTERNAL MEDICINE

## 2018-10-30 PROCEDURE — 36430 TRANSFUSION BLD/BLD COMPNT: CPT

## 2018-10-30 RX ORDER — ACETAMINOPHEN 325 MG/1
650 TABLET ORAL
Status: CANCELLED | OUTPATIENT
Start: 2018-10-30

## 2018-10-30 RX ORDER — HYDROCODONE BITARTRATE AND ACETAMINOPHEN 500; 5 MG/1; MG/1
TABLET ORAL ONCE
Status: CANCELLED | OUTPATIENT
Start: 2018-10-30 | End: 2018-10-30

## 2018-10-30 RX ORDER — DIPHENHYDRAMINE HCL 25 MG
25 CAPSULE ORAL
Status: COMPLETED | OUTPATIENT
Start: 2018-10-30 | End: 2018-10-30

## 2018-10-30 RX ORDER — ACETAMINOPHEN 325 MG/1
650 TABLET ORAL
Status: COMPLETED | OUTPATIENT
Start: 2018-10-30 | End: 2018-10-30

## 2018-10-30 RX ORDER — HYDROCODONE BITARTRATE AND ACETAMINOPHEN 500; 5 MG/1; MG/1
TABLET ORAL ONCE
Status: COMPLETED | OUTPATIENT
Start: 2018-10-30 | End: 2018-10-30

## 2018-10-30 RX ORDER — DIPHENHYDRAMINE HCL 25 MG
25 CAPSULE ORAL
Status: CANCELLED | OUTPATIENT
Start: 2018-10-30

## 2018-10-30 RX ADMIN — ACETAMINOPHEN 650 MG: 325 TABLET ORAL at 02:10

## 2018-10-30 RX ADMIN — DIPHENHYDRAMINE HYDROCHLORIDE 25 MG: 25 CAPSULE ORAL at 02:10

## 2018-10-30 RX ADMIN — SODIUM CHLORIDE: 9 INJECTION, SOLUTION INTRAVENOUS at 02:10

## 2018-10-30 NOTE — PLAN OF CARE
Problem: Patient Care Overview  Goal: Plan of Care Review  Outcome: Ongoing (interventions implemented as appropriate)  1650:  Patient tolerated transfusions well, VSS, NAD noted.  States she feels better now than when she arrived.  PIV removed intact, AVS declined.  Released in stable condition, accompanied by her daughter.

## 2018-10-30 NOTE — TELEPHONE ENCOUNTER
----- Message from Nupur Toure sent at 10/30/2018  9:19 AM CDT -----  Contact: Pt  Pt  called to speak with nurse Sydnie have some questions   Callback#[phone]  Thank You  VEDA Toure    Patient will be a few minutes late to her lab appointment.

## 2018-11-01 ENCOUNTER — OFFICE VISIT (OUTPATIENT)
Dept: HEMATOLOGY/ONCOLOGY | Facility: CLINIC | Age: 68
End: 2018-11-01
Payer: MEDICARE

## 2018-11-01 ENCOUNTER — INFUSION (OUTPATIENT)
Dept: INFUSION THERAPY | Facility: HOSPITAL | Age: 68
End: 2018-11-01
Attending: INTERNAL MEDICINE
Payer: MEDICARE

## 2018-11-01 VITALS
HEIGHT: 63 IN | DIASTOLIC BLOOD PRESSURE: 56 MMHG | SYSTOLIC BLOOD PRESSURE: 100 MMHG | RESPIRATION RATE: 18 BRPM | WEIGHT: 143 LBS | BODY MASS INDEX: 25.34 KG/M2 | HEART RATE: 88 BPM | TEMPERATURE: 98 F

## 2018-11-01 VITALS
SYSTOLIC BLOOD PRESSURE: 108 MMHG | DIASTOLIC BLOOD PRESSURE: 54 MMHG | WEIGHT: 143.94 LBS | HEIGHT: 63 IN | TEMPERATURE: 99 F | HEART RATE: 91 BPM | BODY MASS INDEX: 25.5 KG/M2

## 2018-11-01 DIAGNOSIS — K12.31 MUCOSITIS DUE TO CHEMOTHERAPY: ICD-10-CM

## 2018-11-01 DIAGNOSIS — R79.89 ELEVATED LFTS: ICD-10-CM

## 2018-11-01 DIAGNOSIS — F41.8 DEPRESSION WITH ANXIETY: ICD-10-CM

## 2018-11-01 DIAGNOSIS — C92.00 ACUTE MYELOID LEUKEMIA NOT HAVING ACHIEVED REMISSION: ICD-10-CM

## 2018-11-01 DIAGNOSIS — C92.00 ACUTE MYELOID LEUKEMIA NOT HAVING ACHIEVED REMISSION: Primary | ICD-10-CM

## 2018-11-01 DIAGNOSIS — Z86.79 HISTORY OF VENTRICULAR FIBRILLATION: ICD-10-CM

## 2018-11-01 DIAGNOSIS — Z86.19 HISTORY OF CLOSTRIDIUM DIFFICILE INFECTION: ICD-10-CM

## 2018-11-01 LAB
BLD PROD TYP BPU: NORMAL
BLOOD UNIT EXPIRATION DATE: NORMAL
BLOOD UNIT TYPE CODE: 6200
BLOOD UNIT TYPE: NORMAL
CODING SYSTEM: NORMAL
DISPENSE STATUS: NORMAL
NUM UNITS TRANS PACKED RBC: NORMAL

## 2018-11-01 PROCEDURE — 99999 PR PBB SHADOW E&M-EST. PATIENT-LVL III: CPT | Mod: PBBFAC,,, | Performed by: INTERNAL MEDICINE

## 2018-11-01 PROCEDURE — 36430 TRANSFUSION BLD/BLD COMPNT: CPT

## 2018-11-01 PROCEDURE — 99213 OFFICE O/P EST LOW 20 MIN: CPT | Mod: PBBFAC,25 | Performed by: INTERNAL MEDICINE

## 2018-11-01 PROCEDURE — 86920 COMPATIBILITY TEST SPIN: CPT

## 2018-11-01 PROCEDURE — P9040 RBC LEUKOREDUCED IRRADIATED: HCPCS

## 2018-11-01 PROCEDURE — 99215 OFFICE O/P EST HI 40 MIN: CPT | Mod: S$PBB,,, | Performed by: INTERNAL MEDICINE

## 2018-11-01 RX ORDER — HYDROCODONE BITARTRATE AND ACETAMINOPHEN 500; 5 MG/1; MG/1
TABLET ORAL ONCE
Status: CANCELLED | OUTPATIENT
Start: 2018-11-01 | End: 2018-11-01

## 2018-11-01 RX ORDER — HYDROCODONE BITARTRATE AND ACETAMINOPHEN 500; 5 MG/1; MG/1
TABLET ORAL ONCE
Status: DISCONTINUED | OUTPATIENT
Start: 2018-11-01 | End: 2018-11-01 | Stop reason: HOSPADM

## 2018-11-01 RX ORDER — DIPHENHYDRAMINE HCL 25 MG
25 CAPSULE ORAL
Status: DISCONTINUED | OUTPATIENT
Start: 2018-11-01 | End: 2018-11-01 | Stop reason: HOSPADM

## 2018-11-01 RX ORDER — DIPHENHYDRAMINE HCL 25 MG
25 CAPSULE ORAL
Status: CANCELLED | OUTPATIENT
Start: 2018-11-01

## 2018-11-01 NOTE — Clinical Note
1. See me in 2 weeks with cbc,cmp, type and screen2. Blood transfusion appointment for after apt with me

## 2018-11-01 NOTE — PROGRESS NOTES
Hematology and Medical Oncology   Follow Up Note     11/1/2018    Primary Oncologic Diagnosis: AML, FLT 3 + and NPMN1+.   History of Present Ilness:   Sabrina Badillo) is a pleasant 68 y.o.female presents to clinic following 2 induction therapies for AML. She was in the hospital roughly 50 days to receive 7+3 and then FLAG JENNIFER.    Oncology History:   68 y.o. female admitted overnight for possible new AML. Came in from OSH with elevated WBC of 100.   05/25/2018: Pt is now on hydrea 2 grams BID, allopurinol 300 mg daily, and IVF. Pt may need rasburicase this weekend. She will undergo a BM bx and aspiration today. She is an induction candidate and will not be screened for research trials. She has anxiety for which she usually takes xanax, this was re-started.   05/26/2018 PICC placed. Reports she slept well last night. Anxiety improved with prn xanax. EF 65%, HIV and Hep panel negative. 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  6/12/2018: Day 15 of 7+3 induction, restaging BM bx done yesterday. On Midostaurin. Fever yesterday and BC with gram + cocci in clusters. On cefepime day 2 and will start Vanc today. Labial mucositis improving.   6/13/2018: Day 16 7+3. BC with staph aureus, identification pending. Surveillance blood cultures done today. Afebrile x 48 hours. On cefepime and Vanc. Labial mucositis resolved. No complaints of pain. Nausea controlled. No diarrhea. Primary complaint is fatigue.   6/14/20018: Day 17 of 7+3. Day 14 bone marrow results pending. BC with staph epidermis only sensitive to Vancomycin. Vanc day 3 and cefepime day 4. Vanc trough 12.2 last night. BP remains low but stable. Afebrile x 72 hours.   6/15/2018: Day 18 of 7+3. Day 14 bone marrow biopsy shows persistent disease with 15% blasts. Discussion with patient regarding treatment and she is deciding if she wants to proceed with re-induction vs outpatient maintenance. Temp of 100.4 overnight, unsustained. Day 5 Cefepime and Day 4 of  Vanc for staph epidermis. Repeat cultures NGTD. BP improved. Electrolytes (mag, phos, K and calcium) replaced aggressively this am and will repeat labs this afternoon. No complaints this am.   06/16/2018: Day 19 of 7+3. Day 2 of Flag-JENNIFER. Remains afebrile. Calcium remains low and have increased PO supplementation. Remains on vanc and aztreonam. Somewhat loopy feeling after receiving benadryl.   06/17/2018: Day 20 of 7+3. Day 3 of FLAG-JENNIFER. Multiple electrolyte abnormalities. New bilateral leg and feet swelling.   6/18/2018: Day 21 of 7+3 and Day 4 of FLAG JENNIFER. Tolerating well with some nausea controlled with Zofran. VSS, afebrile. ANC 1900 on Neupogen. Continues Vanc for staph epi bacteremia. Multiple electrolytes replaced today. Complains of edema to lower extremities, not improved after 20 of lasix yesterday. No sob or CP.   6/19/18: Day 22 of 7+3 and Day 5 of FLAG JENNIFER. VSS, afebrile, ANC 3900. Vanc trough elevated and dose adjusted this am. Lower extremity edema improved after 40 of lasix yesterday, net negative 300 cc I&O. Mild nausea, no abdominal pain or diarrhea. Poor appetite but no difficulty eating or taking pills.   6/20/18: Day 23 of 7+3 and Day 6 of FLAG JENNIFER. Patient complains of nausea and vomiting overnight and this am, especially when taking pills. Will add Zyprexa daily in addition to Zofran, compazine, and ativan PRN and will change meds to IV. 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 Afebrile.  No diarrhea, mouth sores or pain.  06/21/2018: Day 24 of 7+3 and Day 7 of FLAG JENNIFER. Nausea better controlled. Continued on Vanc.  6/22/2018: Day 25 of 7+3 and Day 8 of FLAG JENNIFER. Nausea improved some with Zyprexa but did have 1 episode of emesis overnight.continuing meds IV due to vomiting. Remains afebrile. ANC 0. Continues Vanc and Flagyl. Electrolytes replaced.   06/23/2018 : day 26 of 7+3 and Day 9 of FLAG JENNIFER.  Nausea persists,  worsened after taking pills so meds converted to IV.  Encouraged ambulation.  Continue with flagyl for leptotrichia goodfellowii.  RUQ US showed intrahepatic dilation, overall impression hepatic steatosis.  CBD 0.5cm.  No Gallbladder.    06/24/2018 : day 27 of 7+3 and Day 10 of FLAG JENNIFER.  Nausea persisting, able to tolerate some po pills.  Last day of flagyl (day 5).  Still pancytopenic. Appetite still low as po intake triggers nausea.  06/25/2018: day 28 of 7+3 and Day 11 of FLAG JENNIFER. Afebrile, ANC 0. Has completed Flagyl. Will decrease Vanc to 1000 mg q12, trough 19.9, will complete Vanc on 6/27. Liver enzymes stable on Midostaurin. VSS.   6/26/2018: day 29 of 7+3 and day 12 of FLAG JENNIFER. Liver enzymes improved and Midostaurin increased to full dose 50 mg bid. Nausea controlled, afebrile, VSS, NEON.  6/27/2018: day 30 of 7+3 and Day 13 of FLAG JENNIFER. Persistent nausea and emesis x 1 yesterday. Compazine changed to scheduled. Vanc ends today. Afebrile, VSS. Aggressive electrolyte replacement, low electrolytes possibly due to Midostaurin.   6/28/2018: day 31 of 7+3 and Day 14 of FLAG JENNIFER. Nausea improved with scheduled Compazine. Patient has agreed to start converting some IV meds to po. VSS, afebrile, electrolytes wnl today. Only complains of fatigue, new rash noted to upper back and chest and legs, papular rash mildly red.  6/29/2018: Day 32 of 7+3 and Day 15 of FLAG JENNIFER. Day 14 restaging bone marrow biopsy done at bedside today. Patient states nausea improved but she did have emesis last night after taking 9 pm pills. Rash improved. No mouth sores, diarrhea or pain.   7/2/2018: Day 35 of 7+3 and Day 18 of FLAG JENNIFER. Restaging BM bx results pending. Fluid overload over the weekend. Chest x-ray with pulmonary edema. Os sats down to 88%. Placed on O2 at 2 L NC, off O2 this am. Received lasix. Net negative 2.7 L today. 1 episode of nausea, no emesis. Reports anxiety relieved with PRN meds. Electrolytes improved,  afebrile, VSS.   7/3/2018: Day 36 of 7+3 and Day 19 of FLAG JENNIFER. Restaging Bm bx with GABRIEL. Cardiology consulted for abnormal echo, EF 30% with pulmonary HTN and severe L atrial enlargement. EKG with T wave abnormality, possible anterolateral ischemia and prolonged QTc of 530. Medications adjusted. Nausea controlled, no diarrhea. Electrolytes improved. On O2 as needed.   07/04/2018 : Day 37 of 7+3 and Day 20 of FLAG JENNIFER Diarrhea in AM, watery, no associated abdominal pain, nausea, or vomiting.    07/05/2018: Day 38 of 7+3 ane Day 21 of FLAG JENNIFER. No CP or SOB, cardiology following. On RA. All meds changed to po, denies nausea or vomiting and no diarrhea. VSS, afebrile.   7/6/2018: Day 22 of FLAG JENNIFER. Continues to tolerate po meds with no nausea. Afebrile. Awaiting count recovery for discharge.  7/7/2018-/8/2018: Day 40/41 of 7+3, Day 23/24 of FLAG JENNIFER.  Improving clinically.  Started on mag oxide per patient request.  Labs showing signs of ANC recovery.  7/9/2018: Day 25 FLAG JENNIFER, , continues Neupogen. Received platelets today. Afebrile, VSS. Tolerating all oral meds with no nausea or vomiting. Only complains of fatigue.   7/10/2018: Day 26 FLAG JENNIFER,  today. Had 2 episodes of vomiting and diarrhea last night. Feeling better. Afebrile, VSS.   7/11/2018: Day 27 FLAG JENNIFER,  today. No vomiting or diarrhea overnight and no nausea. Afebrile, VSS. Complains of back pain (bone pain) suspect due to count recovery. Relieved with oxy IR and Zyrtec started.  07/12/2018: Day 28 flag jennifer, engrafted today with ANC of 730. Back pain improved with zyrtec. Scheduled for IT chemo tomorrow at 1:30 pm and discharge home thereafter. Will likely need plt transfusion prior to IT chemo tomorrow   7/13/2018: Day 29 of FLAG JENNIFER. ANC up to 1300 today. Transfusing platelets today prior to LP for IT chemo. Patient continues to complain of bone pain, mostly to back and legs. No nausea, diarrhea, sob. Afebrile and VSS.    --hospitalized 7/23-7/25 for C.diff diarrhea, neutropenic fever.  While inpatient she developed pink blisters on her palm that were concerning for relapse.  --palm biopsy on 7/26/18 for suspicion of leukemia cutis was also negative for sign of relapse  --bone marrow biopsy 7/31/18 was negative for signs of relapse   --Admitted on 8/14/18 for HiDAC#1  --Admitted on 9/11/18 for HiDAC#2  --Admitted on 10/16/18 for HiDAC#3    Interval History:   Ms. Mac has had a tough several weeks since discharge. Her energy level remains low, she has a poor appetite. She continues to force herself to eat 3 meals a day. She will continue to get twice weekly labs in [place]. She has been taking midostaurin since day 8 of this cycle and has felt an increasing amount of exhaustion while on the medicine.      Past Medical History:   Past Medical History:   Diagnosis Date    Cancer 03/2018    AML    CHF (congestive heart failure)     Diarrhea 9/14/2018    Encounter for blood transfusion        Current Medications:   Current Outpatient Medications   Medication Sig    acyclovir (ZOVIRAX) 200 MG capsule Take 2 capsules (400 mg total) by mouth 2 (two) times daily.    ciprofloxacin HCl (CIPRO) 500 MG tablet Take 1 tablet (500 mg total) by mouth every 12 (twelve) hours.    dexamethasone (DECADRON) 0.1 % ophthalmic solution Place 1 drop into both eyes every 6 (six) hours. Through 10/24/18. for 3 days    escitalopram oxalate (LEXAPRO) 10 MG tablet Take 10 mg by mouth once daily.    fluconazole (DIFLUCAN) 200 MG Tab Take 2 tablets (400 mg total) by mouth once daily.    LORazepam (ATIVAN) 0.5 MG tablet Take 1 tablet (0.5 mg total) by mouth every 6 (six) hours as needed (nausea).    magnesium oxide (MAG-OX) 400 mg tablet Take 2 tablets (800 mg total) by mouth 2 (two) times daily.    metoprolol succinate (TOPROL-XL) 25 MG 24 hr tablet Take 0.5 tablets (12.5 mg total) by mouth once daily.    midostaurin (RYDAPT) 25 mg Cap Take 50 mg by  mouth 2 (two) times daily on days 8 to 21 of cycle    nystatin (DUKE'S SOLUTION) Take 10 mLs by mouth 4 (four) times daily.    omeprazole (PRILOSEC OTC) 20 MG tablet Take 20 mg by mouth every morning.    ondansetron (ZOFRAN) 8 MG tablet Take 1 tablet (8 mg total) by mouth every 12 (twelve) hours as needed for Nausea.    prochlorperazine (COMPAZINE) 10 MG tablet Take 1 tablet (10 mg total) by mouth 4 (four) times daily.    vitamin D 1000 units Tab Take 1 tablet (1,000 Units total) by mouth once daily.    ALPRAZolam (XANAX) 0.25 MG tablet Take 1 tablet (0.25 mg total) by mouth 3 (three) times daily as needed for Anxiety.     No current facility-administered medications for this visit.      Facility-Administered Medications Ordered in Other Visits   Medication    0.9%  NaCl infusion (for blood administration)    diphenhydrAMINE capsule 25 mg     ALLERGIES:   Review of patient's allergies indicates:   Allergen Reactions    Methotrexate analogues      Elevated Liver Enzyme    Bactrim [sulfamethoxazole-trimethoprim] Nausea And Vomiting and Rash       Review of Systems:     Review of Systems   Constitutional: Positive for appetite change and fatigue. Negative for chills, diaphoresis, fever and unexpected weight change.   HENT:   Negative for hearing loss, mouth sores, nosebleeds, sore throat, trouble swallowing and voice change.    Eyes: Negative for eye problems and icterus.   Respiratory: Negative for chest tightness, cough, hemoptysis, shortness of breath and wheezing.    Cardiovascular: Negative for chest pain, leg swelling and palpitations.   Gastrointestinal: Negative for abdominal distention, abdominal pain, blood in stool, diarrhea, nausea and vomiting.   Endocrine: Negative for hot flashes.   Genitourinary: Negative for bladder incontinence, difficulty urinating, dysuria and hematuria.    Musculoskeletal: Negative for arthralgias, back pain, flank pain, gait problem, myalgias, neck pain and neck  stiffness.   Skin: Negative for itching, rash and wound.   Neurological: Negative for dizziness, extremity weakness, gait problem, headaches, numbness, seizures and speech difficulty.   Hematological: Negative for adenopathy. Bruises/bleeds easily.   Psychiatric/Behavioral: Positive for sleep disturbance. Negative for confusion and depression. The patient is nervous/anxious.         Physical Exam:     Vitals:    11/01/18 0950   BP: (!) 108/54   Pulse: 91   Temp: 99.3 °F (37.4 °C)       Physical Exam   Constitutional: She is oriented to person, place, and time. She appears well-developed and well-nourished. No distress.   HENT:   Head: Normocephalic and atraumatic.   Mouth/Throat: Oropharynx is clear and moist. No oropharyngeal exudate.   No hair, wearing a cap     Eyes: Conjunctivae and EOM are normal. Pupils are equal, round, and reactive to light.   Neck: Normal range of motion. Neck supple. No JVD present. No tracheal deviation present. No thyromegaly present.   Cardiovascular: Normal rate, regular rhythm and normal heart sounds. Exam reveals no friction rub.   No murmur heard.  Pulmonary/Chest: Effort normal and breath sounds normal. No stridor. No respiratory distress. She has no wheezes. She has no rales. She exhibits no tenderness.   Abdominal: Soft. Bowel sounds are normal. She exhibits no distension. There is no tenderness. There is no rebound and no guarding.   Musculoskeletal: Normal range of motion. She exhibits no edema, tenderness or deformity.   Lymphadenopathy:     She has no axillary adenopathy.   Neurological: She is alert and oriented to person, place, and time. She displays normal reflexes. No cranial nerve deficit or sensory deficit. She exhibits normal muscle tone. Coordination normal.   Skin: Skin is warm and dry. Capillary refill takes less than 2 seconds. No rash noted. She is not diaphoretic. No erythema. No pallor.   Numerous lower extremity petechie   Psychiatric: She has a normal mood  and affect. Her behavior is normal. Judgment and thought content normal.       ECOG Performance Status: (foot note - ECOG PS provided by Eastern Cooperative Oncology Group) 2 - Symptomatic, <50% confined to bed    Karnofsky Performance Score:  70%- Cares for Self: Unable to Carry on Normal Activity or Active Work    Labs:   Lab Results   Component Value Date    WBC 0.02 (LL) 11/01/2018    HGB 6.4 (L) 11/01/2018    HCT 18.8 (LL) 11/01/2018    PLT 23 (LL) 11/01/2018    ALT 82 (H) 11/01/2018    AST 73 (H) 11/01/2018     11/01/2018    K 3.7 11/01/2018     11/01/2018    CREATININE 0.7 11/01/2018    BUN 15 11/01/2018    CO2 26 11/01/2018    INR 1.1 05/31/2018       Imaging: previous imaging has been reviewed    Assessment and Plan:     Mrs. Mac is a pleasant 68 year old female with recently diagnosed AML.      Acute Monocytic Leukemia  -- Admitted from [facility] on 5/25/18 early AM with WBC around 100s, for suspected new non-M3 AML  --lo res HLA typing on 5/26/18 and hi res on 5/27/18 done in anticipation of possible need for future transplant   --Pt with two daughters, two full sisters (in their mid 60s, one with brain aneurysm hx, other one without any medical issues), and one full brother (59 y/o healthy).  --NPM1 +, CEBPA (-), FLT3 (+).  On Midostaurin 50 mg PO BID until Day 14 (held starting 6/8 to 6/11). Stopped when FLAG JENNIFER re-induction started. Restarted Midostaurin at 25 mg bid on day 8 of FLAG JENNIFER induction (6/22), increase to full dose 50 mg bid (6/26) as improvement in liver enzymes. Stopped 7/3/18 due to abnormal cardiac echo.  --day 14 bone marrow biopsy completed 6/11 showing persistent disease with 15% CD 34 positive blasts  --Day 60 of FLAG-JENNIFER, tolerating without difficulty except nausea/vomiting  --day 14 restaging bone marrow biopsy done 6/29 without complication, results with no evidence of AML, FLT 3 negative, will need repeat marrow at count recovery outpatient   --recovery  marrow showed no evidence of disease  --HiDAC #1 on 8/14/18, HiDAC#2 on 9/11/18  --Cycle 3 delayed 1 week due to pancytopenia. Started on 10/16/18  --Plan on recovery bone marrow in several weeks    Chemotherapy related anemia/thromboctopenia   ----Between consolidation therapies mon/thurs labs locally with the reports sent to me  --We are able to transfuse blood locally  --Will receive one unit of blood today      Suspected Leukemia Cutis  --pathology from dermatology confirmed inflammatory changes  --now resolved with topical steroid cream    Abnormal Liver Function Tests  --ALT and AST and Alk phos have again increased substantially since re-starting midostaurin.   --midostaurin started day 8 at 25 mg bid, monitoring liver enzymes closely and improved. Increasing Midostaurin to 50 mg bid 6/26. Stopped Midostaurin (7/3) due to new diagnosis of systolic heart failure EF 35%.  --6/22/18 liver US showing mild intrahepatic dilation, otherwise impression of hepatic steatosis.  Will hold off of MRCP at this time.    --Will permanently discontinue midostaurin     Chemotherapy induced cardiomyopathy  --cardiology follow-up as outpatient in 3 months  --repeat 2D echo on 6/15 with preserved EF of 60%.  However 7/2/18 echo with reduced EF 30-35%  --Echo on 8/15/18 was within normal range EF of 60-65%    30 minutes were spent face to face with the patient and her  family to discuss the disease, natural history, treatment options and survival statistics. I have provided the patient with an opportunity to ask questions and have all questions answered to her satisfaction.       she will return to clinic in about 2 weeks, but knows to call in the interim if symptoms change or should a problem arise.        Laquita Troy MD  Hematology and Medical Oncology  Bone Marrow Transplant  [facility]

## 2018-11-01 NOTE — PLAN OF CARE
Problem: Anemia (Adult)  Goal: Identify Related Risk Factors and Signs and Symptoms  Related risk factors and signs and symptoms are identified upon initiation of Human Response Clinical Practice Guideline (CPG)  Outcome: Ongoing (interventions implemented as appropriate)  Pt here for 1 unit PRBC, pt with c/o feeling dizzy, weak, short of breath. Assisted from wheelchair to recliner, reclined in chair, warm blanket sprovided, continue to monitor

## 2018-11-01 NOTE — PLAN OF CARE
Problem: Patient Care Overview  Goal: Plan of Care Review  Outcome: Ongoing (interventions implemented as appropriate)  Pt tolerated prbc without issue, pt has no upcoming appts scheduled at this time, no distress noted upon d/c to home

## 2018-11-06 ENCOUNTER — TELEPHONE (OUTPATIENT)
Dept: HEMATOLOGY/ONCOLOGY | Facility: CLINIC | Age: 68
End: 2018-11-06

## 2018-11-06 NOTE — TELEPHONE ENCOUNTER
----- Message from Nupur Andre sent at 11/6/2018  8:03 AM CST -----  Contact: Pt   Pt  called to speak with nurse Sydnie have some questions   Callback#[phone]  Thank You  VEDA andre    Spoke with patient. A message was send to Dr Troy about patient blood transfusion in [place].

## 2018-11-06 NOTE — TELEPHONE ENCOUNTER
----- Message from Nupur Toure sent at 11/6/2018 10:31 AM CST -----  Contact: Pt    Pt  Called to speak with nurse Sydnie Needs to speak with you about the fax   Callback#[phone]  Thank You  VEDA Toure      Spoke with patient's . New fax sent. Patient notified.

## 2018-11-08 DIAGNOSIS — C93.00 ACUTE MONOCYTIC LEUKEMIA NOT HAVING ACHIEVED REMISSION: ICD-10-CM

## 2018-11-08 RX ORDER — ALPRAZOLAM 0.25 MG/1
0.25 TABLET ORAL 3 TIMES DAILY PRN
Qty: 90 TABLET | Refills: 1 | Status: SHIPPED | OUTPATIENT
Start: 2018-11-08 | End: 2019-04-04 | Stop reason: SDUPTHER

## 2018-11-12 ENCOUNTER — DOCUMENTATION ONLY (OUTPATIENT)
Dept: HEMATOLOGY/ONCOLOGY | Facility: CLINIC | Age: 68
End: 2018-11-12

## 2018-11-15 ENCOUNTER — OFFICE VISIT (OUTPATIENT)
Dept: HEMATOLOGY/ONCOLOGY | Facility: CLINIC | Age: 68
End: 2018-11-15
Payer: MEDICARE

## 2018-11-15 ENCOUNTER — LAB VISIT (OUTPATIENT)
Dept: LAB | Facility: HOSPITAL | Age: 68
End: 2018-11-15
Attending: INTERNAL MEDICINE
Payer: MEDICARE

## 2018-11-15 VITALS
HEART RATE: 86 BPM | SYSTOLIC BLOOD PRESSURE: 111 MMHG | DIASTOLIC BLOOD PRESSURE: 60 MMHG | OXYGEN SATURATION: 98 % | HEIGHT: 63 IN | WEIGHT: 140.63 LBS | BODY MASS INDEX: 24.92 KG/M2 | TEMPERATURE: 98 F | RESPIRATION RATE: 20 BRPM

## 2018-11-15 DIAGNOSIS — R79.89 ELEVATED LFTS: ICD-10-CM

## 2018-11-15 DIAGNOSIS — C93.01 ACUTE MONOCYTIC LEUKEMIA IN REMISSION: Primary | ICD-10-CM

## 2018-11-15 DIAGNOSIS — C92.00 AML (ACUTE MYELOBLASTIC LEUKEMIA): ICD-10-CM

## 2018-11-15 DIAGNOSIS — G89.29 OTHER CHRONIC PAIN: ICD-10-CM

## 2018-11-15 DIAGNOSIS — C93.00: ICD-10-CM

## 2018-11-15 LAB
ABO + RH BLD: NORMAL
ALBUMIN SERPL BCP-MCNC: 3.1 G/DL
ALBUMIN SERPL BCP-MCNC: 3.1 G/DL
ALP SERPL-CCNC: 492 U/L
ALP SERPL-CCNC: 492 U/L
ALT SERPL W/O P-5'-P-CCNC: 17 U/L
ALT SERPL W/O P-5'-P-CCNC: 17 U/L
ANION GAP SERPL CALC-SCNC: 11 MMOL/L
ANION GAP SERPL CALC-SCNC: 11 MMOL/L
AST SERPL-CCNC: 21 U/L
AST SERPL-CCNC: 21 U/L
BILIRUB SERPL-MCNC: 0.5 MG/DL
BILIRUB SERPL-MCNC: 0.5 MG/DL
BLD GP AB SCN CELLS X3 SERPL QL: NORMAL
BUN SERPL-MCNC: 10 MG/DL
BUN SERPL-MCNC: 10 MG/DL
CALCIUM SERPL-MCNC: 9.7 MG/DL
CALCIUM SERPL-MCNC: 9.7 MG/DL
CHLORIDE SERPL-SCNC: 100 MMOL/L
CHLORIDE SERPL-SCNC: 100 MMOL/L
CO2 SERPL-SCNC: 26 MMOL/L
CO2 SERPL-SCNC: 26 MMOL/L
CREAT SERPL-MCNC: 0.7 MG/DL
CREAT SERPL-MCNC: 0.7 MG/DL
ERYTHROCYTE [DISTWIDTH] IN BLOOD BY AUTOMATED COUNT: 12.5 %
EST. GFR  (AFRICAN AMERICAN): >60 ML/MIN/1.73 M^2
EST. GFR  (AFRICAN AMERICAN): >60 ML/MIN/1.73 M^2
EST. GFR  (NON AFRICAN AMERICAN): >60 ML/MIN/1.73 M^2
EST. GFR  (NON AFRICAN AMERICAN): >60 ML/MIN/1.73 M^2
GLUCOSE SERPL-MCNC: 120 MG/DL
GLUCOSE SERPL-MCNC: 120 MG/DL
HCT VFR BLD AUTO: 22.9 %
HGB BLD-MCNC: 7.7 G/DL
IMM GRANULOCYTES # BLD AUTO: 0.07 K/UL
MCH RBC QN AUTO: 29.2 PG
MCHC RBC AUTO-ENTMCNC: 33.6 G/DL
MCV RBC AUTO: 87 FL
NEUTROPHILS # BLD AUTO: 1.3 K/UL
PLATELET # BLD AUTO: 23 K/UL
PMV BLD AUTO: 12.6 FL
POTASSIUM SERPL-SCNC: 3.6 MMOL/L
POTASSIUM SERPL-SCNC: 3.6 MMOL/L
PROT SERPL-MCNC: 6.2 G/DL
PROT SERPL-MCNC: 6.2 G/DL
RBC # BLD AUTO: 2.64 M/UL
SODIUM SERPL-SCNC: 137 MMOL/L
SODIUM SERPL-SCNC: 137 MMOL/L
WBC # BLD AUTO: 2.59 K/UL

## 2018-11-15 PROCEDURE — 99215 OFFICE O/P EST HI 40 MIN: CPT | Mod: S$PBB,,, | Performed by: INTERNAL MEDICINE

## 2018-11-15 PROCEDURE — 85027 COMPLETE CBC AUTOMATED: CPT

## 2018-11-15 PROCEDURE — 86901 BLOOD TYPING SEROLOGIC RH(D): CPT

## 2018-11-15 PROCEDURE — 80053 COMPREHEN METABOLIC PANEL: CPT

## 2018-11-15 PROCEDURE — 36415 COLL VENOUS BLD VENIPUNCTURE: CPT

## 2018-11-15 PROCEDURE — 99999 PR PBB SHADOW E&M-EST. PATIENT-LVL IV: CPT | Mod: PBBFAC,,, | Performed by: INTERNAL MEDICINE

## 2018-11-15 PROCEDURE — 99214 OFFICE O/P EST MOD 30 MIN: CPT | Mod: PBBFAC | Performed by: INTERNAL MEDICINE

## 2018-11-15 NOTE — Clinical Note
1. See me in 2 weeks with cbc,cmp, type and screen Patient will need additional days due to IV abx  Patient was at rehab previously and was taken home with VNA   Cm department will continue to follow Pt through d c

## 2018-11-15 NOTE — PROGRESS NOTES
Hematology and Medical Oncology   Follow Up Note     11/15/2018    Primary Oncologic Diagnosis: AML, FLT 3 + and NPMN1+.   History of Present Ilness:   Sabrina Badillo) is a pleasant 68 y.o.female presents to clinic following 2 induction therapies for AML. She was in the hospital roughly 50 days to receive 7+3 and then FLAG JENNIFER.    Oncology History:   68 y.o. female admitted overnight for possible new AML. Came in from OSH with elevated WBC of 100.   05/25/2018: Pt is now on hydrea 2 grams BID, allopurinol 300 mg daily, and IVF. Pt may need rasburicase this weekend. She will undergo a BM bx and aspiration today. She is an induction candidate and will not be screened for research trials. She has anxiety for which she usually takes xanax, this was re-started.   05/26/2018 PICC placed. Reports she slept well last night. Anxiety improved with prn xanax. EF 65%, HIV and Hep panel negative. 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  6/12/2018: Day 15 of 7+3 induction, restaging BM bx done yesterday. On Midostaurin. Fever yesterday and BC with gram + cocci in clusters. On cefepime day 2 and will start Vanc today. Labial mucositis improving.   6/13/2018: Day 16 7+3. BC with staph aureus, identification pending. Surveillance blood cultures done today. Afebrile x 48 hours. On cefepime and Vanc. Labial mucositis resolved. No complaints of pain. Nausea controlled. No diarrhea. Primary complaint is fatigue.   6/14/20018: Day 17 of 7+3. Day 14 bone marrow results pending. BC with staph epidermis only sensitive to Vancomycin. Vanc day 3 and cefepime day 4. Vanc trough 12.2 last night. BP remains low but stable. Afebrile x 72 hours.   6/15/2018: Day 18 of 7+3. Day 14 bone marrow biopsy shows persistent disease with 15% blasts. Discussion with patient regarding treatment and she is deciding if she wants to proceed with re-induction vs outpatient maintenance. Temp of 100.4 overnight, unsustained. Day 5 Cefepime and Day 4 of  Vanc for staph epidermis. Repeat cultures NGTD. BP improved. Electrolytes (mag, phos, K and calcium) replaced aggressively this am and will repeat labs this afternoon. No complaints this am.   06/16/2018: Day 19 of 7+3. Day 2 of Flag-JENNIFER. Remains afebrile. Calcium remains low and have increased PO supplementation. Remains on vanc and aztreonam. Somewhat loopy feeling after receiving benadryl.   06/17/2018: Day 20 of 7+3. Day 3 of FLAG-JENNIFER. Multiple electrolyte abnormalities. New bilateral leg and feet swelling.   6/18/2018: Day 21 of 7+3 and Day 4 of FLAG JENNIFER. Tolerating well with some nausea controlled with Zofran. VSS, afebrile. ANC 1900 on Neupogen. Continues Vanc for staph epi bacteremia. Multiple electrolytes replaced today. Complains of edema to lower extremities, not improved after 20 of lasix yesterday. No sob or CP.   6/19/18: Day 22 of 7+3 and Day 5 of FLAG JENNIFER. VSS, afebrile, ANC 3900. Vanc trough elevated and dose adjusted this am. Lower extremity edema improved after 40 of lasix yesterday, net negative 300 cc I&O. Mild nausea, no abdominal pain or diarrhea. Poor appetite but no difficulty eating or taking pills.   6/20/18: Day 23 of 7+3 and Day 6 of FLAG JENNIFER. Patient complains of nausea and vomiting overnight and this am, especially when taking pills. Will add Zyprexa daily in addition to Zofran, compazine, and ativan PRN and will change meds to IV. 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 Afebrile.  No diarrhea, mouth sores or pain.  06/21/2018: Day 24 of 7+3 and Day 7 of FLAG JENNIFER. Nausea better controlled. Continued on Vanc.  6/22/2018: Day 25 of 7+3 and Day 8 of FLAG JENNIFER. Nausea improved some with Zyprexa but did have 1 episode of emesis overnight.continuing meds IV due to vomiting. Remains afebrile. ANC 0. Continues Vanc and Flagyl. Electrolytes replaced.   06/23/2018 : day 26 of 7+3 and Day 9 of FLAG JENNIFER.  Nausea persists,  worsened after taking pills so meds converted to IV.  Encouraged ambulation.  Continue with flagyl for leptotrichia goodfellowii.  RUQ US showed intrahepatic dilation, overall impression hepatic steatosis.  CBD 0.5cm.  No Gallbladder.    06/24/2018 : day 27 of 7+3 and Day 10 of FLAG JENNIFER.  Nausea persisting, able to tolerate some po pills.  Last day of flagyl (day 5).  Still pancytopenic. Appetite still low as po intake triggers nausea.  06/25/2018: day 28 of 7+3 and Day 11 of FLAG JENNIFER. Afebrile, ANC 0. Has completed Flagyl. Will decrease Vanc to 1000 mg q12, trough 19.9, will complete Vanc on 6/27. Liver enzymes stable on Midostaurin. VSS.   6/26/2018: day 29 of 7+3 and day 12 of FLAG JENNIFER. Liver enzymes improved and Midostaurin increased to full dose 50 mg bid. Nausea controlled, afebrile, VSS, NEON.  6/27/2018: day 30 of 7+3 and Day 13 of FLAG JENNIFER. Persistent nausea and emesis x 1 yesterday. Compazine changed to scheduled. Vanc ends today. Afebrile, VSS. Aggressive electrolyte replacement, low electrolytes possibly due to Midostaurin.   6/28/2018: day 31 of 7+3 and Day 14 of FLAG JENNIFER. Nausea improved with scheduled Compazine. Patient has agreed to start converting some IV meds to po. VSS, afebrile, electrolytes wnl today. Only complains of fatigue, new rash noted to upper back and chest and legs, papular rash mildly red.  6/29/2018: Day 32 of 7+3 and Day 15 of FLAG JENNIFER. Day 14 restaging bone marrow biopsy done at bedside today. Patient states nausea improved but she did have emesis last night after taking 9 pm pills. Rash improved. No mouth sores, diarrhea or pain.   7/2/2018: Day 35 of 7+3 and Day 18 of FLAG JENNIFER. Restaging BM bx results pending. Fluid overload over the weekend. Chest x-ray with pulmonary edema. Os sats down to 88%. Placed on O2 at 2 L NC, off O2 this am. Received lasix. Net negative 2.7 L today. 1 episode of nausea, no emesis. Reports anxiety relieved with PRN meds. Electrolytes improved,  afebrile, VSS.   7/3/2018: Day 36 of 7+3 and Day 19 of FLAG JENNIFER. Restaging Bm bx with GABRIEL. Cardiology consulted for abnormal echo, EF 30% with pulmonary HTN and severe L atrial enlargement. EKG with T wave abnormality, possible anterolateral ischemia and prolonged QTc of 530. Medications adjusted. Nausea controlled, no diarrhea. Electrolytes improved. On O2 as needed.   07/04/2018 : Day 37 of 7+3 and Day 20 of FLAG JENNIFER Diarrhea in AM, watery, no associated abdominal pain, nausea, or vomiting.    07/05/2018: Day 38 of 7+3 ane Day 21 of FLAG JENNIFER. No CP or SOB, cardiology following. On RA. All meds changed to po, denies nausea or vomiting and no diarrhea. VSS, afebrile.   7/6/2018: Day 22 of FLAG JENNIFER. Continues to tolerate po meds with no nausea. Afebrile. Awaiting count recovery for discharge.  7/7/2018-/8/2018: Day 40/41 of 7+3, Day 23/24 of FLAG JENINFER.  Improving clinically.  Started on mag oxide per patient request.  Labs showing signs of ANC recovery.  7/9/2018: Day 25 FLAG JENNIFER, , continues Neupogen. Received platelets today. Afebrile, VSS. Tolerating all oral meds with no nausea or vomiting. Only complains of fatigue.   7/10/2018: Day 26 FLAG JENNIFER,  today. Had 2 episodes of vomiting and diarrhea last night. Feeling better. Afebrile, VSS.   7/11/2018: Day 27 FLAG JENNIFER,  today. No vomiting or diarrhea overnight and no nausea. Afebrile, VSS. Complains of back pain (bone pain) suspect due to count recovery. Relieved with oxy IR and Zyrtec started.  07/12/2018: Day 28 flag jennifer, engrafted today with ANC of 730. Back pain improved with zyrtec. Scheduled for IT chemo tomorrow at 1:30 pm and discharge home thereafter. Will likely need plt transfusion prior to IT chemo tomorrow   7/13/2018: Day 29 of FLAG JENNIFER. ANC up to 1300 today. Transfusing platelets today prior to LP for IT chemo. Patient continues to complain of bone pain, mostly to back and legs. No nausea, diarrhea, sob. Afebrile and VSS.    --hospitalized 7/23-7/25 for C.diff diarrhea, neutropenic fever.  While inpatient she developed pink blisters on her palm that were concerning for relapse.  --palm biopsy on 7/26/18 for suspicion of leukemia cutis was also negative for sign of relapse  --bone marrow biopsy 7/31/18 was negative for signs of relapse   --Admitted on 8/14/18 for HiDAC#1  --Admitted on 9/11/18 for HiDAC#2  --Admitted on 10/16/18 for HiDAC#3    Interval History:   Ms. Wilkes energy level remains low, she has a poor appetite, but does feel like she is starting to eat a bit more.  She required blood and platelets last week, which resulted in a hospital admission after a transfusion reaction.     Past Medical History:   Past Medical History:   Diagnosis Date    Cancer 03/2018    AML    CHF (congestive heart failure)     Diarrhea 9/14/2018    Encounter for blood transfusion        Current Medications:   Current Outpatient Medications   Medication Sig    acyclovir (ZOVIRAX) 200 MG capsule Take 2 capsules (400 mg total) by mouth 2 (two) times daily.    ALPRAZolam (XANAX) 0.25 MG tablet Take 1 tablet (0.25 mg total) by mouth 3 (three) times daily as needed for Anxiety.    ciprofloxacin HCl (CIPRO) 500 MG tablet Take 1 tablet (500 mg total) by mouth every 12 (twelve) hours.    escitalopram oxalate (LEXAPRO) 10 MG tablet Take 10 mg by mouth once daily.    fluconazole (DIFLUCAN) 200 MG Tab Take 2 tablets (400 mg total) by mouth once daily.    LORazepam (ATIVAN) 0.5 MG tablet Take 1 tablet (0.5 mg total) by mouth every 6 (six) hours as needed (nausea).    magnesium oxide (MAG-OX) 400 mg tablet Take 2 tablets (800 mg total) by mouth 2 (two) times daily.    metoprolol succinate (TOPROL-XL) 25 MG 24 hr tablet Take 0.5 tablets (12.5 mg total) by mouth once daily.    midostaurin (RYDAPT) 25 mg Cap Take 50 mg by mouth 2 (two) times daily on days 8 to 21 of cycle    nystatin (DUKE'S SOLUTION) Take 10 mLs by mouth 4 (four) times daily.     omeprazole (PRILOSEC OTC) 20 MG tablet Take 20 mg by mouth every morning.    ondansetron (ZOFRAN) 8 MG tablet Take 1 tablet (8 mg total) by mouth every 12 (twelve) hours as needed for Nausea.    prochlorperazine (COMPAZINE) 10 MG tablet Take 1 tablet (10 mg total) by mouth 4 (four) times daily.    vitamin D 1000 units Tab Take 1 tablet (1,000 Units total) by mouth once daily.     No current facility-administered medications for this visit.      ALLERGIES:   Review of patient's allergies indicates:   Allergen Reactions    Methotrexate analogues      Elevated Liver Enzyme    Bactrim [sulfamethoxazole-trimethoprim] Nausea And Vomiting and Rash       Review of Systems:     Review of Systems   Constitutional: Positive for appetite change and fatigue. Negative for chills, diaphoresis, fever and unexpected weight change.   HENT:   Negative for hearing loss, mouth sores, nosebleeds, sore throat, trouble swallowing and voice change.    Eyes: Negative for eye problems and icterus.   Respiratory: Negative for chest tightness, cough, hemoptysis, shortness of breath and wheezing.    Cardiovascular: Negative for chest pain, leg swelling and palpitations.   Gastrointestinal: Negative for abdominal distention, abdominal pain, blood in stool, diarrhea, nausea and vomiting.   Endocrine: Negative for hot flashes.   Genitourinary: Negative for bladder incontinence, difficulty urinating, dysuria and hematuria.    Musculoskeletal: Negative for arthralgias, back pain, flank pain, gait problem, myalgias, neck pain and neck stiffness.   Skin: Negative for itching, rash and wound.   Neurological: Negative for dizziness, extremity weakness, gait problem, headaches, numbness, seizures and speech difficulty.   Hematological: Negative for adenopathy. Bruises/bleeds easily.   Psychiatric/Behavioral: Positive for sleep disturbance. Negative for confusion and depression. The patient is nervous/anxious.         Physical Exam:     Vitals:     11/15/18 1027   BP: 111/60   Pulse: 86   Resp: 20   Temp: 98.1 °F (36.7 °C)       Physical Exam   Constitutional: She is oriented to person, place, and time. She appears well-developed and well-nourished. No distress.   HENT:   Head: Normocephalic and atraumatic.   Mouth/Throat: Oropharynx is clear and moist. No oropharyngeal exudate.   No hair, wearing a cap     Eyes: Conjunctivae and EOM are normal. Pupils are equal, round, and reactive to light.   Neck: Normal range of motion. Neck supple. No JVD present. No tracheal deviation present. No thyromegaly present.   Cardiovascular: Normal rate, regular rhythm and normal heart sounds. Exam reveals no friction rub.   No murmur heard.  Pulmonary/Chest: Effort normal and breath sounds normal. No stridor. No respiratory distress. She has no wheezes. She has no rales. She exhibits no tenderness.   Abdominal: Soft. Bowel sounds are normal. She exhibits no distension. There is no tenderness. There is no rebound and no guarding.   Musculoskeletal: Normal range of motion. She exhibits no edema, tenderness or deformity.   Lymphadenopathy:     She has no axillary adenopathy.   Neurological: She is alert and oriented to person, place, and time. She displays normal reflexes. No cranial nerve deficit or sensory deficit. She exhibits normal muscle tone. Coordination normal.   Skin: Skin is warm and dry. Capillary refill takes less than 2 seconds. No rash noted. She is not diaphoretic. No erythema. No pallor.   Numerous lower extremity petechie   Psychiatric: She has a normal mood and affect. Her behavior is normal. Judgment and thought content normal.       ECOG Performance Status: (foot note - ECOG PS provided by Eastern Cooperative Oncology Group) 2 - Symptomatic, <50% confined to bed    Karnofsky Performance Score:  70%- Cares for Self: Unable to Carry on Normal Activity or Active Work    Labs:   Lab Results   Component Value Date    WBC 2.59 (L) 11/15/2018    HGB 7.7 (L)  11/15/2018    HCT 22.9 (L) 11/15/2018    PLT 23 (LL) 11/15/2018    ALT 17 11/15/2018    ALT 17 11/15/2018    AST 21 11/15/2018    AST 21 11/15/2018     11/15/2018     11/15/2018    K 3.6 11/15/2018    K 3.6 11/15/2018     11/15/2018     11/15/2018    CREATININE 0.7 11/15/2018    CREATININE 0.7 11/15/2018    BUN 10 11/15/2018    BUN 10 11/15/2018    CO2 26 11/15/2018    CO2 26 11/15/2018    INR 1.1 05/31/2018       Imaging: previous imaging has been reviewed    Assessment and Plan:     Mrs. Mac is a pleasant 68 year old female with recently diagnosed AML.      Acute Monocytic Leukemia  -- Admitted from [facility] on 5/25/18 early AM with WBC around 100s, for suspected new non-M3 AML  --lo res HLA typing on 5/26/18 and hi res on 5/27/18 done in anticipation of possible need for future transplant   --Pt with two daughters, two full sisters (in their mid 60s, one with brain aneurysm hx, other one without any medical issues), and one full brother (59 y/o healthy).  --NPM1 +, CEBPA (-), FLT3 (+).  On Midostaurin 50 mg PO BID until Day 14 (held starting 6/8 to 6/11). Stopped when FLAG JENNIFER re-induction started. Restarted Midostaurin at 25 mg bid on day 8 of FLAG JENNIFER induction (6/22), increase to full dose 50 mg bid (6/26) as improvement in liver enzymes. Stopped 7/3/18 due to abnormal cardiac echo.  --day 14 bone marrow biopsy completed 6/11 showing persistent disease with 15% CD 34 positive blasts  --Day 60 of FLAG-JENNIFER, tolerating without difficulty except nausea/vomiting  --day 14 restaging bone marrow biopsy done 6/29 without complication, results with no evidence of AML, FLT 3 negative, will need repeat marrow at count recovery outpatient   --recovery marrow showed no evidence of disease  --HiDAC #1 on 8/14/18, HiDAC#2 on 9/11/18  --Cycle 3 delayed 1 week due to pancytopenia. Started on 10/16/18  --Plan on recovery bone marrow in several weeks  --Counts are beginning to  recover    Chemotherapy related anemia/thromboctopenia   ----Between consolidation therapies mon/thurs labs locally with the reports sent to me  --We are able to transfuse blood locally  --Does not need blood today      Suspected Leukemia Cutis  --pathology from dermatology confirmed inflammatory changes  --now resolved with topical steroid cream    Abnormal Liver Function Tests  --ALT and AST and Alk phos have again increased substantially since re-starting midostaurin.   --midostaurin started day 8 at 25 mg bid, monitoring liver enzymes closely and improved. Increasing Midostaurin to 50 mg bid 6/26. Stopped Midostaurin (7/3) due to new diagnosis of systolic heart failure EF 35%.  --6/22/18 liver US showing mild intrahepatic dilation, otherwise impression of hepatic steatosis.  Will hold off of MRCP at this time.    --Will permanently discontinue midostaurin   --Alk phos 492 today    Chemotherapy induced cardiomyopathy  --cardiology follow-up as outpatient in 3 months  --repeat 2D echo on 6/15 with preserved EF of 60%.  However 7/2/18 echo with reduced EF 30-35%  --Echo on 8/15/18 was within normal range EF of 60-65%    30 minutes were spent face to face with the patient and her  family to discuss the disease, natural history, treatment options and survival statistics. I have provided the patient with an opportunity to ask questions and have all questions answered to her satisfaction.       she will return to clinic in about 2 weeks, but knows to call in the interim if symptoms change or should a problem arise.        Laquita Troy MD  Hematology and Medical Oncology  Bone Marrow Transplant  [facility]

## 2018-11-19 ENCOUNTER — TELEPHONE (OUTPATIENT)
Dept: PHARMACY | Facility: CLINIC | Age: 68
End: 2018-11-19

## 2018-11-19 NOTE — TELEPHONE ENCOUNTER
"Per chart note from 11/1 and 11/15 with Dr. Troy: "Will permanently discontinue midostaurin." D/t increase in LFTs. Spoke with patient's  - he confirmed Ms. Mac had stopped taking the Rydapt but that they still have some medication remaining. Advised him to take the remaining medication to her appt with Dr. Troy on 11/29 for proper disposal, not to throw it away or put it in the drain. He verbalized understanding. Mr. Mac inquired about getting a refill for the prescription (cost was very high for them while in the "donut hole"). Advised him that unfortunately once [facility] dispenses the medication, no refunds can be issued. He voiced understanding. Will close out at [facility].  "

## 2018-11-29 ENCOUNTER — LAB VISIT (OUTPATIENT)
Dept: LAB | Facility: HOSPITAL | Age: 68
End: 2018-11-29
Attending: INTERNAL MEDICINE
Payer: MEDICARE

## 2018-11-29 ENCOUNTER — OFFICE VISIT (OUTPATIENT)
Dept: HEMATOLOGY/ONCOLOGY | Facility: CLINIC | Age: 68
End: 2018-11-29
Payer: MEDICARE

## 2018-11-29 DIAGNOSIS — C93.01 ACUTE MONOCYTIC LEUKEMIA IN REMISSION: ICD-10-CM

## 2018-11-29 DIAGNOSIS — C92.01 ACUTE MYELOID LEUKEMIA IN REMISSION: Primary | ICD-10-CM

## 2018-11-29 DIAGNOSIS — C92.00 AML (ACUTE MYELOBLASTIC LEUKEMIA): ICD-10-CM

## 2018-11-29 DIAGNOSIS — K12.31 MUCOSITIS DUE TO CHEMOTHERAPY: ICD-10-CM

## 2018-11-29 DIAGNOSIS — R79.89 ELEVATED LFTS: ICD-10-CM

## 2018-11-29 DIAGNOSIS — Z86.19 HISTORY OF CLOSTRIDIUM DIFFICILE INFECTION: ICD-10-CM

## 2018-11-29 LAB
ABO + RH BLD: NORMAL
ALBUMIN SERPL BCP-MCNC: 3.1 G/DL
ALP SERPL-CCNC: 359 U/L
ALT SERPL W/O P-5'-P-CCNC: 49 U/L
ANION GAP SERPL CALC-SCNC: 11 MMOL/L
AST SERPL-CCNC: 110 U/L
BILIRUB SERPL-MCNC: 0.5 MG/DL
BLD GP AB SCN CELLS X3 SERPL QL: NORMAL
BUN SERPL-MCNC: 8 MG/DL
CALCIUM SERPL-MCNC: 9.6 MG/DL
CHLORIDE SERPL-SCNC: 102 MMOL/L
CO2 SERPL-SCNC: 24 MMOL/L
CREAT SERPL-MCNC: 0.8 MG/DL
ERYTHROCYTE [DISTWIDTH] IN BLOOD BY AUTOMATED COUNT: 20.2 %
EST. GFR  (AFRICAN AMERICAN): >60 ML/MIN/1.73 M^2
EST. GFR  (NON AFRICAN AMERICAN): >60 ML/MIN/1.73 M^2
GLUCOSE SERPL-MCNC: 113 MG/DL
HCT VFR BLD AUTO: 26.2 %
HGB BLD-MCNC: 8.4 G/DL
IMM GRANULOCYTES # BLD AUTO: 0.03 K/UL
MCH RBC QN AUTO: 31.3 PG
MCHC RBC AUTO-ENTMCNC: 32.1 G/DL
MCV RBC AUTO: 98 FL
NEUTROPHILS # BLD AUTO: 1.8 K/UL
PLATELET # BLD AUTO: 99 K/UL
PMV BLD AUTO: 11.1 FL
POTASSIUM SERPL-SCNC: 3.7 MMOL/L
PROT SERPL-MCNC: 6 G/DL
RBC # BLD AUTO: 2.68 M/UL
SODIUM SERPL-SCNC: 137 MMOL/L
WBC # BLD AUTO: 2.86 K/UL

## 2018-11-29 PROCEDURE — 36415 COLL VENOUS BLD VENIPUNCTURE: CPT

## 2018-11-29 PROCEDURE — 85027 COMPLETE CBC AUTOMATED: CPT

## 2018-11-29 PROCEDURE — 86850 RBC ANTIBODY SCREEN: CPT

## 2018-11-29 PROCEDURE — 99215 OFFICE O/P EST HI 40 MIN: CPT | Mod: S$PBB,,, | Performed by: INTERNAL MEDICINE

## 2018-11-29 PROCEDURE — 80053 COMPREHEN METABOLIC PANEL: CPT

## 2018-11-29 NOTE — Clinical Note
1. See me the morning of 12/27 with cbc,cmp at 9am2. Bone marrow biopsy on 12/27 in the or after my clinic appointment3. See me afternoon of jan 3rd with cbc,cmp

## 2018-11-29 NOTE — PROGRESS NOTES
Hematology and Medical Oncology   Follow Up Note     11/29/2018    Primary Oncologic Diagnosis: AML, FLT 3 + and NPMN1+.   History of Present Ilness:   Sabrina Badillo) is a pleasant 68 y.o.female presents to clinic following 2 induction therapies for AML. She was in the hospital roughly 50 days to receive 7+3 and then FLAG JENNIFER.    Oncology History:   68 y.o. female admitted overnight for possible new AML. Came in from OSH with elevated WBC of 100.   05/25/2018: Pt is now on hydrea 2 grams BID, allopurinol 300 mg daily, and IVF. Pt may need rasburicase this weekend. She will undergo a BM bx and aspiration today. She is an induction candidate and will not be screened for research trials. She has anxiety for which she usually takes xanax, this was re-started.   05/26/2018 PICC placed. Reports she slept well last night. Anxiety improved with prn xanax. EF 65%, HIV and Hep panel negative. 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  6/12/2018: Day 15 of 7+3 induction, restaging BM bx done yesterday. On Midostaurin. Fever yesterday and BC with gram + cocci in clusters. On cefepime day 2 and will start Vanc today. Labial mucositis improving.   6/13/2018: Day 16 7+3. BC with staph aureus, identification pending. Surveillance blood cultures done today. Afebrile x 48 hours. On cefepime and Vanc. Labial mucositis resolved. No complaints of pain. Nausea controlled. No diarrhea. Primary complaint is fatigue.   6/14/20018: Day 17 of 7+3. Day 14 bone marrow results pending. BC with staph epidermis only sensitive to Vancomycin. Vanc day 3 and cefepime day 4. Vanc trough 12.2 last night. BP remains low but stable. Afebrile x 72 hours.   6/15/2018: Day 18 of 7+3. Day 14 bone marrow biopsy shows persistent disease with 15% blasts. Discussion with patient regarding treatment and she is deciding if she wants to proceed with re-induction vs outpatient maintenance. Temp of 100.4 overnight, unsustained. Day 5 Cefepime and Day 4 of  Vanc for staph epidermis. Repeat cultures NGTD. BP improved. Electrolytes (mag, phos, K and calcium) replaced aggressively this am and will repeat labs this afternoon. No complaints this am.   06/16/2018: Day 19 of 7+3. Day 2 of Flag-JENNIFER. Remains afebrile. Calcium remains low and have increased PO supplementation. Remains on vanc and aztreonam. Somewhat loopy feeling after receiving benadryl.   06/17/2018: Day 20 of 7+3. Day 3 of FLAG-JENNIFER. Multiple electrolyte abnormalities. New bilateral leg and feet swelling.   6/18/2018: Day 21 of 7+3 and Day 4 of FLAG JENNIFER. Tolerating well with some nausea controlled with Zofran. VSS, afebrile. ANC 1900 on Neupogen. Continues Vanc for staph epi bacteremia. Multiple electrolytes replaced today. Complains of edema to lower extremities, not improved after 20 of lasix yesterday. No sob or CP.   6/19/18: Day 22 of 7+3 and Day 5 of FLAG JENNIFER. VSS, afebrile, ANC 3900. Vanc trough elevated and dose adjusted this am. Lower extremity edema improved after 40 of lasix yesterday, net negative 300 cc I&O. Mild nausea, no abdominal pain or diarrhea. Poor appetite but no difficulty eating or taking pills.   6/20/18: Day 23 of 7+3 and Day 6 of FLAG JENNIFER. Patient complains of nausea and vomiting overnight and this am, especially when taking pills. Will add Zyprexa daily in addition to Zofran, compazine, and ativan PRN and will change meds to IV. 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 Afebrile.  No diarrhea, mouth sores or pain.  06/21/2018: Day 24 of 7+3 and Day 7 of FLAG JENNIFER. Nausea better controlled. Continued on Vanc.  6/22/2018: Day 25 of 7+3 and Day 8 of FLAG JENNIFER. Nausea improved some with Zyprexa but did have 1 episode of emesis overnight.continuing meds IV due to vomiting. Remains afebrile. ANC 0. Continues Vanc and Flagyl. Electrolytes replaced.   06/23/2018 : day 26 of 7+3 and Day 9 of FLAG JENNIFER.  Nausea persists,  worsened after taking pills so meds converted to IV.  Encouraged ambulation.  Continue with flagyl for leptotrichia goodfellowii.  RUQ US showed intrahepatic dilation, overall impression hepatic steatosis.  CBD 0.5cm.  No Gallbladder.    06/24/2018 : day 27 of 7+3 and Day 10 of FLAG JENNIFER.  Nausea persisting, able to tolerate some po pills.  Last day of flagyl (day 5).  Still pancytopenic. Appetite still low as po intake triggers nausea.  06/25/2018: day 28 of 7+3 and Day 11 of FLAG JENNIFER. Afebrile, ANC 0. Has completed Flagyl. Will decrease Vanc to 1000 mg q12, trough 19.9, will complete Vanc on 6/27. Liver enzymes stable on Midostaurin. VSS.   6/26/2018: day 29 of 7+3 and day 12 of FLAG JENNIFER. Liver enzymes improved and Midostaurin increased to full dose 50 mg bid. Nausea controlled, afebrile, VSS, NEON.  6/27/2018: day 30 of 7+3 and Day 13 of FLAG JENNIFER. Persistent nausea and emesis x 1 yesterday. Compazine changed to scheduled. Vanc ends today. Afebrile, VSS. Aggressive electrolyte replacement, low electrolytes possibly due to Midostaurin.   6/28/2018: day 31 of 7+3 and Day 14 of FLAG JENNIFER. Nausea improved with scheduled Compazine. Patient has agreed to start converting some IV meds to po. VSS, afebrile, electrolytes wnl today. Only complains of fatigue, new rash noted to upper back and chest and legs, papular rash mildly red.  6/29/2018: Day 32 of 7+3 and Day 15 of FLAG JENNIFER. Day 14 restaging bone marrow biopsy done at bedside today. Patient states nausea improved but she did have emesis last night after taking 9 pm pills. Rash improved. No mouth sores, diarrhea or pain.   7/2/2018: Day 35 of 7+3 and Day 18 of FLAG JENNIFER. Restaging BM bx results pending. Fluid overload over the weekend. Chest x-ray with pulmonary edema. Os sats down to 88%. Placed on O2 at 2 L NC, off O2 this am. Received lasix. Net negative 2.7 L today. 1 episode of nausea, no emesis. Reports anxiety relieved with PRN meds. Electrolytes improved,  afebrile, VSS.   7/3/2018: Day 36 of 7+3 and Day 19 of FLAG JENNIFER. Restaging Bm bx with GABRIEL. Cardiology consulted for abnormal echo, EF 30% with pulmonary HTN and severe L atrial enlargement. EKG with T wave abnormality, possible anterolateral ischemia and prolonged QTc of 530. Medications adjusted. Nausea controlled, no diarrhea. Electrolytes improved. On O2 as needed.   07/04/2018 : Day 37 of 7+3 and Day 20 of FLAG JENNIFER Diarrhea in AM, watery, no associated abdominal pain, nausea, or vomiting.    07/05/2018: Day 38 of 7+3 ane Day 21 of FLAG JENNIFER. No CP or SOB, cardiology following. On RA. All meds changed to po, denies nausea or vomiting and no diarrhea. VSS, afebrile.   7/6/2018: Day 22 of FLAG JENNIFER. Continues to tolerate po meds with no nausea. Afebrile. Awaiting count recovery for discharge.  7/7/2018-/8/2018: Day 40/41 of 7+3, Day 23/24 of FLAG JENNIFER.  Improving clinically.  Started on mag oxide per patient request.  Labs showing signs of ANC recovery.  7/9/2018: Day 25 FLAG JENNIFER, , continues Neupogen. Received platelets today. Afebrile, VSS. Tolerating all oral meds with no nausea or vomiting. Only complains of fatigue.   7/10/2018: Day 26 FLAG JENNIFER,  today. Had 2 episodes of vomiting and diarrhea last night. Feeling better. Afebrile, VSS.   7/11/2018: Day 27 FLAG JENNIFER,  today. No vomiting or diarrhea overnight and no nausea. Afebrile, VSS. Complains of back pain (bone pain) suspect due to count recovery. Relieved with oxy IR and Zyrtec started.  07/12/2018: Day 28 flag jennifer, engrafted today with ANC of 730. Back pain improved with zyrtec. Scheduled for IT chemo tomorrow at 1:30 pm and discharge home thereafter. Will likely need plt transfusion prior to IT chemo tomorrow   7/13/2018: Day 29 of FLAG JENNIFER. ANC up to 1300 today. Transfusing platelets today prior to LP for IT chemo. Patient continues to complain of bone pain, mostly to back and legs. No nausea, diarrhea, sob. Afebrile and VSS.    --hospitalized 7/23-7/25 for C.diff diarrhea, neutropenic fever.  While inpatient she developed pink blisters on her palm that were concerning for relapse.  --palm biopsy on 7/26/18 for suspicion of leukemia cutis was also negative for sign of relapse  --bone marrow biopsy 7/31/18 was negative for signs of relapse   --Admitted on 8/14/18 for HiDAC#1  --Admitted on 9/11/18 for HiDAC#2  --Admitted on 10/16/18 for HiDAC#3    Interval History:   Ms. Wilkes is doing markedly better. He energy has greatly improved, she was able to stay up 13 hours with the [employer] recently. She is now eating. No longer requiring blood transfusions.    Past Medical History:   Past Medical History:   Diagnosis Date    Cancer 03/2018    AML    CHF (congestive heart failure)     Diarrhea 9/14/2018    Encounter for blood transfusion        Current Medications:   Current Outpatient Medications   Medication Sig    acyclovir (ZOVIRAX) 200 MG capsule Take 2 capsules (400 mg total) by mouth 2 (two) times daily.    ALPRAZolam (XANAX) 0.25 MG tablet Take 1 tablet (0.25 mg total) by mouth 3 (three) times daily as needed for Anxiety.    ciprofloxacin HCl (CIPRO) 500 MG tablet Take 1 tablet (500 mg total) by mouth every 12 (twelve) hours.    escitalopram oxalate (LEXAPRO) 10 MG tablet Take 10 mg by mouth once daily.    fluconazole (DIFLUCAN) 200 MG Tab Take 2 tablets (400 mg total) by mouth once daily.    LORazepam (ATIVAN) 0.5 MG tablet Take 1 tablet (0.5 mg total) by mouth every 6 (six) hours as needed (nausea).    magnesium oxide (MAG-OX) 400 mg tablet Take 2 tablets (800 mg total) by mouth 2 (two) times daily.    metoprolol succinate (TOPROL-XL) 25 MG 24 hr tablet Take 0.5 tablets (12.5 mg total) by mouth once daily.    midostaurin (RYDAPT) 25 mg Cap Take 50 mg by mouth 2 (two) times daily on days 8 to 21 of cycle    nystatin (DUKE'S SOLUTION) Take 10 mLs by mouth 4 (four) times daily.    omeprazole (PRILOSEC OTC) 20 MG tablet  Take 20 mg by mouth every morning.    ondansetron (ZOFRAN) 8 MG tablet Take 1 tablet (8 mg total) by mouth every 12 (twelve) hours as needed for Nausea.    prochlorperazine (COMPAZINE) 10 MG tablet Take 1 tablet (10 mg total) by mouth 4 (four) times daily.    vitamin D 1000 units Tab Take 1 tablet (1,000 Units total) by mouth once daily.     No current facility-administered medications for this visit.      ALLERGIES:   Review of patient's allergies indicates:   Allergen Reactions    Methotrexate analogues      Elevated Liver Enzyme    Bactrim [sulfamethoxazole-trimethoprim] Nausea And Vomiting and Rash       Review of Systems:     Review of Systems   Constitutional: Positive for appetite change and fatigue. Negative for chills, diaphoresis, fever and unexpected weight change.   HENT:   Negative for hearing loss, mouth sores, nosebleeds, sore throat, trouble swallowing and voice change.    Eyes: Negative for eye problems and icterus.   Respiratory: Negative for chest tightness, cough, hemoptysis, shortness of breath and wheezing.    Cardiovascular: Negative for chest pain, leg swelling and palpitations.   Gastrointestinal: Negative for abdominal distention, abdominal pain, blood in stool, diarrhea, nausea and vomiting.   Endocrine: Negative for hot flashes.   Genitourinary: Negative for bladder incontinence, difficulty urinating, dysuria and hematuria.    Musculoskeletal: Negative for arthralgias, back pain, flank pain, gait problem, myalgias, neck pain and neck stiffness.   Skin: Negative for itching, rash and wound.   Neurological: Negative for dizziness, extremity weakness, gait problem, headaches, numbness, seizures and speech difficulty.   Hematological: Negative for adenopathy. Bruises/bleeds easily.   Psychiatric/Behavioral: Positive for sleep disturbance. Negative for confusion and depression. The patient is nervous/anxious.         Physical Exam:     There were no vitals filed for this  visit.    Physical Exam   Constitutional: She is oriented to person, place, and time. She appears well-developed and well-nourished. No distress.   HENT:   Head: Normocephalic and atraumatic.   Mouth/Throat: Oropharynx is clear and moist. No oropharyngeal exudate.   No hair, wearing a cap     Eyes: Conjunctivae and EOM are normal. Pupils are equal, round, and reactive to light.   Neck: Normal range of motion. Neck supple. No JVD present. No tracheal deviation present. No thyromegaly present.   Cardiovascular: Normal rate, regular rhythm and normal heart sounds. Exam reveals no friction rub.   No murmur heard.  Pulmonary/Chest: Effort normal and breath sounds normal. No stridor. No respiratory distress. She has no wheezes. She has no rales. She exhibits no tenderness.   Abdominal: Soft. Bowel sounds are normal. She exhibits no distension. There is no tenderness. There is no rebound and no guarding.   Musculoskeletal: Normal range of motion. She exhibits no edema, tenderness or deformity.   Lymphadenopathy:     She has no axillary adenopathy.   Neurological: She is alert and oriented to person, place, and time. She displays normal reflexes. No cranial nerve deficit or sensory deficit. She exhibits normal muscle tone. Coordination normal.   Skin: Skin is warm and dry. Capillary refill takes less than 2 seconds. No rash noted. She is not diaphoretic. No erythema. No pallor.   Psychiatric: She has a normal mood and affect. Her behavior is normal. Judgment and thought content normal.       ECOG Performance Status: (foot note - ECOG PS provided by Eastern Cooperative Oncology Group) 2 - Symptomatic, <50% confined to bed    Karnofsky Performance Score:  70%- Cares for Self: Unable to Carry on Normal Activity or Active Work    Labs:   Lab Results   Component Value Date    WBC 2.86 (L) 11/29/2018    HGB 8.4 (L) 11/29/2018    HCT 26.2 (L) 11/29/2018    PLT 99 (L) 11/29/2018    ALT 49 (H) 11/29/2018     (H) 11/29/2018      11/29/2018    K 3.7 11/29/2018     11/29/2018    CREATININE 0.8 11/29/2018    BUN 8 11/29/2018    CO2 24 11/29/2018    INR 1.1 05/31/2018       Imaging: previous imaging has been reviewed    Assessment and Plan:     Mrs. Mac is a pleasant 68 year old female with recently diagnosed AML.      Acute Monocytic Leukemia  -- Ad[facility] early AM with WBC around 100s, for suspected new non-M3 AML  --lo res HLA typing on 5/26/18 and hi res on 5/27/18 done in anticipation of possible need for future transplant   --Pt with two daughters, two full sisters (in their mid 60s, one with brain aneurysm hx, other one without any medical issues), and one full brother (61 y/o healthy).  --NPM1 +, CEBPA (-), FLT3 (+).  On Midostaurin 50 mg PO BID until Day 14 (held starting 6/8 to 6/11). Stopped when FLAG JENNIFER re-induction started. Restarted Midostaurin at 25 mg bid on day 8 of FLAG JENNIFER induction (6/22), increase to full dose 50 mg bid (6/26) as improvement in liver enzymes. Stopped 7/3/18 due to abnormal cardiac echo.  --day 14 bone marrow biopsy completed 6/11 showing persistent disease with 15% CD 34 positive blasts  --Day 60 of FLAG-JENNIFER, tolerating without difficulty except nausea/vomiting  --day 14 restaging bone marrow biopsy done 6/29 without complication, results with no evidence of AML, FLT 3 negative, will need repeat marrow at count recovery outpatient   --recovery marrow showed no evidence of disease  --HiDAC #1 on 8/14/18, HiDAC#2 on 9/11/18  --Cycle 3 delayed 1 week due to pancytopenia. Started on 10/16/18  --Plan on recovery bone marrow right after jesse  --Counts are recovering  --Will decrease lab checks to weekly    Chemotherapy related anemia/thromboctopenia   ----Between consolidation therapies mon/thurs labs locally with the reports sent to me  --We are able to transfuse blood locally  --Does not need blood today      Suspected Leukemia Cutis  --pathology from  dermatology confirmed inflammatory changes  --now resolved with topical steroid cream    Abnormal Liver Function Tests  --ALT and AST and Alk phos have again increased substantially since re-starting midostaurin.   --midostaurin started day 8 at 25 mg bid, monitoring liver enzymes closely and improved. Increasing Midostaurin to 50 mg bid 6/26. Stopped Midostaurin (7/3) due to new diagnosis of systolic heart failure EF 35%.  --6/22/18 liver US showing mild intrahepatic dilation, otherwise impression of hepatic steatosis.  Will hold off of MRCP at this time.    --Will permanently discontinue midostaurin   --Alk phos 492 today    Chemotherapy induced cardiomyopathy  --cardiology follow-up as outpatient in 3 months  --repeat 2D echo on 6/15 with preserved EF of 60%.  However 7/2/18 echo with reduced EF 30-35%  --Echo on 8/15/18 was within normal range EF of 60-65%    30 minutes were spent face to face with the patient and her  family to discuss the disease, natural history, treatment options and survival statistics. I have provided the patient with an opportunity to ask questions and have all questions answered to her satisfaction.       she will return to clinic in 3-4 weeks, but knows to call in the interim if symptoms change or should a problem arise.        Laquita Troy MD  Hematology and Medical Oncology  Bone Marrow Transplant  [facility]

## 2018-11-29 NOTE — H&P (VIEW-ONLY)
Hematology and Medical Oncology   Follow Up Note     11/29/2018    Primary Oncologic Diagnosis: AML, FLT 3 + and NPMN1+.   History of Present Ilness:   Sabrina Badillo) is a pleasant 68 y.o.female presents to clinic following 2 induction therapies for AML. She was in the hospital roughly 50 days to receive 7+3 and then FLAG JENNIFER.    Oncology History:   68 y.o. female admitted overnight for possible new AML. Came in from OSH with elevated WBC of 100.   05/25/2018: Pt is now on hydrea 2 grams BID, allopurinol 300 mg daily, and IVF. Pt may need rasburicase this weekend. She will undergo a BM bx and aspiration today. She is an induction candidate and will not be screened for research trials. She has anxiety for which she usually takes xanax, this was re-started.   05/26/2018 PICC placed. Reports she slept well last night. Anxiety improved with prn xanax. EF 65%, HIV and Hep panel negative. 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  6/12/2018: Day 15 of 7+3 induction, restaging BM bx done yesterday. On Midostaurin. Fever yesterday and BC with gram + cocci in clusters. On cefepime day 2 and will start Vanc today. Labial mucositis improving.   6/13/2018: Day 16 7+3. BC with staph aureus, identification pending. Surveillance blood cultures done today. Afebrile x 48 hours. On cefepime and Vanc. Labial mucositis resolved. No complaints of pain. Nausea controlled. No diarrhea. Primary complaint is fatigue.   6/14/20018: Day 17 of 7+3. Day 14 bone marrow results pending. BC with staph epidermis only sensitive to Vancomycin. Vanc day 3 and cefepime day 4. Vanc trough 12.2 last night. BP remains low but stable. Afebrile x 72 hours.   6/15/2018: Day 18 of 7+3. Day 14 bone marrow biopsy shows persistent disease with 15% blasts. Discussion with patient regarding treatment and she is deciding if she wants to proceed with re-induction vs outpatient maintenance. Temp of 100.4 overnight, unsustained. Day 5 Cefepime and Day 4 of  Vanc for staph epidermis. Repeat cultures NGTD. BP improved. Electrolytes (mag, phos, K and calcium) replaced aggressively this am and will repeat labs this afternoon. No complaints this am.   06/16/2018: Day 19 of 7+3. Day 2 of Flag-JENNIFER. Remains afebrile. Calcium remains low and have increased PO supplementation. Remains on vanc and aztreonam. Somewhat loopy feeling after receiving benadryl.   06/17/2018: Day 20 of 7+3. Day 3 of FLAG-JENNIFER. Multiple electrolyte abnormalities. New bilateral leg and feet swelling.   6/18/2018: Day 21 of 7+3 and Day 4 of FLAG JENNIFER. Tolerating well with some nausea controlled with Zofran. VSS, afebrile. ANC 1900 on Neupogen. Continues Vanc for staph epi bacteremia. Multiple electrolytes replaced today. Complains of edema to lower extremities, not improved after 20 of lasix yesterday. No sob or CP.   6/19/18: Day 22 of 7+3 and Day 5 of FLAG JENNIFER. VSS, afebrile, ANC 3900. Vanc trough elevated and dose adjusted this am. Lower extremity edema improved after 40 of lasix yesterday, net negative 300 cc I&O. Mild nausea, no abdominal pain or diarrhea. Poor appetite but no difficulty eating or taking pills.   6/20/18: Day 23 of 7+3 and Day 6 of FLAG JENNIFER. Patient complains of nausea and vomiting overnight and this am, especially when taking pills. Will add Zyprexa daily in addition to Zofran, compazine, and ativan PRN and will change meds to IV. 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 Afebrile.  No diarrhea, mouth sores or pain.  06/21/2018: Day 24 of 7+3 and Day 7 of FLAG JENNIFER. Nausea better controlled. Continued on Vanc.  6/22/2018: Day 25 of 7+3 and Day 8 of FLAG JENNIFER. Nausea improved some with Zyprexa but did have 1 episode of emesis overnight.continuing meds IV due to vomiting. Remains afebrile. ANC 0. Continues Vanc and Flagyl. Electrolytes replaced.   06/23/2018 : day 26 of 7+3 and Day 9 of FLAG JENNIFER.  Nausea persists,  worsened after taking pills so meds converted to IV.  Encouraged ambulation.  Continue with flagyl for leptotrichia goodfellowii.  RUQ US showed intrahepatic dilation, overall impression hepatic steatosis.  CBD 0.5cm.  No Gallbladder.    06/24/2018 : day 27 of 7+3 and Day 10 of FLAG JENNIFER.  Nausea persisting, able to tolerate some po pills.  Last day of flagyl (day 5).  Still pancytopenic. Appetite still low as po intake triggers nausea.  06/25/2018: day 28 of 7+3 and Day 11 of FLAG JENNIFER. Afebrile, ANC 0. Has completed Flagyl. Will decrease Vanc to 1000 mg q12, trough 19.9, will complete Vanc on 6/27. Liver enzymes stable on Midostaurin. VSS.   6/26/2018: day 29 of 7+3 and day 12 of FLAG JENNIFER. Liver enzymes improved and Midostaurin increased to full dose 50 mg bid. Nausea controlled, afebrile, VSS, NEON.  6/27/2018: day 30 of 7+3 and Day 13 of FLAG JENNIFER. Persistent nausea and emesis x 1 yesterday. Compazine changed to scheduled. Vanc ends today. Afebrile, VSS. Aggressive electrolyte replacement, low electrolytes possibly due to Midostaurin.   6/28/2018: day 31 of 7+3 and Day 14 of FLAG JENNIFER. Nausea improved with scheduled Compazine. Patient has agreed to start converting some IV meds to po. VSS, afebrile, electrolytes wnl today. Only complains of fatigue, new rash noted to upper back and chest and legs, papular rash mildly red.  6/29/2018: Day 32 of 7+3 and Day 15 of FLAG JENNIFER. Day 14 restaging bone marrow biopsy done at bedside today. Patient states nausea improved but she did have emesis last night after taking 9 pm pills. Rash improved. No mouth sores, diarrhea or pain.   7/2/2018: Day 35 of 7+3 and Day 18 of FLAG JENNIFER. Restaging BM bx results pending. Fluid overload over the weekend. Chest x-ray with pulmonary edema. Os sats down to 88%. Placed on O2 at 2 L NC, off O2 this am. Received lasix. Net negative 2.7 L today. 1 episode of nausea, no emesis. Reports anxiety relieved with PRN meds. Electrolytes improved,  afebrile, VSS.   7/3/2018: Day 36 of 7+3 and Day 19 of FLAG JENNIFER. Restaging Bm bx with GABRIEL. Cardiology consulted for abnormal echo, EF 30% with pulmonary HTN and severe L atrial enlargement. EKG with T wave abnormality, possible anterolateral ischemia and prolonged QTc of 530. Medications adjusted. Nausea controlled, no diarrhea. Electrolytes improved. On O2 as needed.   07/04/2018 : Day 37 of 7+3 and Day 20 of FLAG JENNIFER Diarrhea in AM, watery, no associated abdominal pain, nausea, or vomiting.    07/05/2018: Day 38 of 7+3 ane Day 21 of FLAG JENNIFER. No CP or SOB, cardiology following. On RA. All meds changed to po, denies nausea or vomiting and no diarrhea. VSS, afebrile.   7/6/2018: Day 22 of FLAG JENNIFER. Continues to tolerate po meds with no nausea. Afebrile. Awaiting count recovery for discharge.  7/7/2018-/8/2018: Day 40/41 of 7+3, Day 23/24 of FLAG JENNIFER.  Improving clinically.  Started on mag oxide per patient request.  Labs showing signs of ANC recovery.  7/9/2018: Day 25 FLAG JENNIFER, , continues Neupogen. Received platelets today. Afebrile, VSS. Tolerating all oral meds with no nausea or vomiting. Only complains of fatigue.   7/10/2018: Day 26 FLAG JENNIFER,  today. Had 2 episodes of vomiting and diarrhea last night. Feeling better. Afebrile, VSS.   7/11/2018: Day 27 FLAG JENNIFER,  today. No vomiting or diarrhea overnight and no nausea. Afebrile, VSS. Complains of back pain (bone pain) suspect due to count recovery. Relieved with oxy IR and Zyrtec started.  07/12/2018: Day 28 flag jennifer, engrafted today with ANC of 730. Back pain improved with zyrtec. Scheduled for IT chemo tomorrow at 1:30 pm and discharge home thereafter. Will likely need plt transfusion prior to IT chemo tomorrow   7/13/2018: Day 29 of FLAG JENNIFER. ANC up to 1300 today. Transfusing platelets today prior to LP for IT chemo. Patient continues to complain of bone pain, mostly to back and legs. No nausea, diarrhea, sob. Afebrile and VSS.    --hospitalized 7/23-7/25 for C.diff diarrhea, neutropenic fever.  While inpatient she developed pink blisters on her palm that were concerning for relapse.  --palm biopsy on 7/26/18 for suspicion of leukemia cutis was also negative for sign of relapse  --bone marrow biopsy 7/31/18 was negative for signs of relapse   --Admitted on 8/14/18 for HiDAC#1  --Admitted on 9/11/18 for HiDAC#2  --Admitted on 10/16/18 for HiDAC#3    Interval History:   Ms. Wilkes is doing markedly better. He energy has greatly improved, she was able to stay up 13 hours with the [facility] recently. She is now eating. No longer requiring blood transfusions.    Past Medical History:   Past Medical History:   Diagnosis Date    Cancer 03/2018    AML    CHF (congestive heart failure)     Diarrhea 9/14/2018    Encounter for blood transfusion        Current Medications:   Current Outpatient Medications   Medication Sig    acyclovir (ZOVIRAX) 200 MG capsule Take 2 capsules (400 mg total) by mouth 2 (two) times daily.    ALPRAZolam (XANAX) 0.25 MG tablet Take 1 tablet (0.25 mg total) by mouth 3 (three) times daily as needed for Anxiety.    ciprofloxacin HCl (CIPRO) 500 MG tablet Take 1 tablet (500 mg total) by mouth every 12 (twelve) hours.    escitalopram oxalate (LEXAPRO) 10 MG tablet Take 10 mg by mouth once daily.    fluconazole (DIFLUCAN) 200 MG Tab Take 2 tablets (400 mg total) by mouth once daily.    LORazepam (ATIVAN) 0.5 MG tablet Take 1 tablet (0.5 mg total) by mouth every 6 (six) hours as needed (nausea).    magnesium oxide (MAG-OX) 400 mg tablet Take 2 tablets (800 mg total) by mouth 2 (two) times daily.    metoprolol succinate (TOPROL-XL) 25 MG 24 hr tablet Take 0.5 tablets (12.5 mg total) by mouth once daily.    midostaurin (RYDAPT) 25 mg Cap Take 50 mg by mouth 2 (two) times daily on days 8 to 21 of cycle    nystatin (DUKE'S SOLUTION) Take 10 mLs by mouth 4 (four) times daily.    omeprazole (PRILOSEC OTC) 20 MG tablet  Take 20 mg by mouth every morning.    ondansetron (ZOFRAN) 8 MG tablet Take 1 tablet (8 mg total) by mouth every 12 (twelve) hours as needed for Nausea.    prochlorperazine (COMPAZINE) 10 MG tablet Take 1 tablet (10 mg total) by mouth 4 (four) times daily.    vitamin D 1000 units Tab Take 1 tablet (1,000 Units total) by mouth once daily.     No current facility-administered medications for this visit.      ALLERGIES:   Review of patient's allergies indicates:   Allergen Reactions    Methotrexate analogues      Elevated Liver Enzyme    Bactrim [sulfamethoxazole-trimethoprim] Nausea And Vomiting and Rash       Review of Systems:     Review of Systems   Constitutional: Positive for appetite change and fatigue. Negative for chills, diaphoresis, fever and unexpected weight change.   HENT:   Negative for hearing loss, mouth sores, nosebleeds, sore throat, trouble swallowing and voice change.    Eyes: Negative for eye problems and icterus.   Respiratory: Negative for chest tightness, cough, hemoptysis, shortness of breath and wheezing.    Cardiovascular: Negative for chest pain, leg swelling and palpitations.   Gastrointestinal: Negative for abdominal distention, abdominal pain, blood in stool, diarrhea, nausea and vomiting.   Endocrine: Negative for hot flashes.   Genitourinary: Negative for bladder incontinence, difficulty urinating, dysuria and hematuria.    Musculoskeletal: Negative for arthralgias, back pain, flank pain, gait problem, myalgias, neck pain and neck stiffness.   Skin: Negative for itching, rash and wound.   Neurological: Negative for dizziness, extremity weakness, gait problem, headaches, numbness, seizures and speech difficulty.   Hematological: Negative for adenopathy. Bruises/bleeds easily.   Psychiatric/Behavioral: Positive for sleep disturbance. Negative for confusion and depression. The patient is nervous/anxious.         Physical Exam:     There were no vitals filed for this  visit.    Physical Exam   Constitutional: She is oriented to person, place, and time. She appears well-developed and well-nourished. No distress.   HENT:   Head: Normocephalic and atraumatic.   Mouth/Throat: Oropharynx is clear and moist. No oropharyngeal exudate.   No hair, wearing a cap     Eyes: Conjunctivae and EOM are normal. Pupils are equal, round, and reactive to light.   Neck: Normal range of motion. Neck supple. No JVD present. No tracheal deviation present. No thyromegaly present.   Cardiovascular: Normal rate, regular rhythm and normal heart sounds. Exam reveals no friction rub.   No murmur heard.  Pulmonary/Chest: Effort normal and breath sounds normal. No stridor. No respiratory distress. She has no wheezes. She has no rales. She exhibits no tenderness.   Abdominal: Soft. Bowel sounds are normal. She exhibits no distension. There is no tenderness. There is no rebound and no guarding.   Musculoskeletal: Normal range of motion. She exhibits no edema, tenderness or deformity.   Lymphadenopathy:     She has no axillary adenopathy.   Neurological: She is alert and oriented to person, place, and time. She displays normal reflexes. No cranial nerve deficit or sensory deficit. She exhibits normal muscle tone. Coordination normal.   Skin: Skin is warm and dry. Capillary refill takes less than 2 seconds. No rash noted. She is not diaphoretic. No erythema. No pallor.   Psychiatric: She has a normal mood and affect. Her behavior is normal. Judgment and thought content normal.       ECOG Performance Status: (foot note - ECOG PS provided by Eastern Cooperative Oncology Group) 2 - Symptomatic, <50% confined to bed    Karnofsky Performance Score:  70%- Cares for Self: Unable to Carry on Normal Activity or Active Work    Labs:   Lab Results   Component Value Date    WBC 2.86 (L) 11/29/2018    HGB 8.4 (L) 11/29/2018    HCT 26.2 (L) 11/29/2018    PLT 99 (L) 11/29/2018    ALT 49 (H) 11/29/2018     (H) 11/29/2018      11/29/2018    K 3.7 11/29/2018     11/29/2018    CREATININE 0.8 11/29/2018    BUN 8 11/29/2018    CO2 24 11/29/2018    INR 1.1 05/31/2018       Imaging: previous imaging has been reviewed    Assessment and Plan:     Mrs. Mac is a pleasant 68 year old female with recently diagnosed AML.      Acute Monocytic Leukemia  -- Admitted from [facility] on 5/25/18 early AM with WBC around 100s, for suspected new non-M3 AML  --lo res HLA typing on 5/26/18 and hi res on 5/27/18 done in anticipation of possible need for future transplant   --Pt with two daughters, two full sisters (in their mid 60s, one with brain aneurysm hx, other one without any medical issues), and one full brother (59 y/o healthy).  --NPM1 +, CEBPA (-), FLT3 (+).  On Midostaurin 50 mg PO BID until Day 14 (held starting 6/8 to 6/11). Stopped when FLAG JENNIFER re-induction started. Restarted Midostaurin at 25 mg bid on day 8 of FLAG JENNIFER induction (6/22), increase to full dose 50 mg bid (6/26) as improvement in liver enzymes. Stopped 7/3/18 due to abnormal cardiac echo.  --day 14 bone marrow biopsy completed 6/11 showing persistent disease with 15% CD 34 positive blasts  --Day 60 of FLAG-JENNIFER, tolerating without difficulty except nausea/vomiting  --day 14 restaging bone marrow biopsy done 6/29 without complication, results with no evidence of AML, FLT 3 negative, will need repeat marrow at count recovery outpatient   --recovery marrow showed no evidence of disease  --HiDAC #1 on 8/14/18, HiDAC#2 on 9/11/18  --Cycle 3 delayed 1 week due to pancytopenia. Started on 10/16/18  --Plan on recovery bone marrow right after jesse  --Counts are recovering  --Will decrease lab checks to weekly    Chemotherapy related anemia/thromboctopenia   ----Between consolidation therapies mon/thurs labs locally with the reports sent to me  --We are able to transfuse blood locally  --Does not need blood today      Suspected Leukemia Cutis  --pathology from  dermatology confirmed inflammatory changes  --now resolved with topical steroid cream    Abnormal Liver Function Tests  --ALT and AST and Alk phos have again increased substantially since re-starting midostaurin.   --midostaurin started day 8 at 25 mg bid, monitoring liver enzymes closely and improved. Increasing Midostaurin to 50 mg bid 6/26. Stopped Midostaurin (7/3) due to new diagnosis of systolic heart failure EF 35%.  --6/22/18 liver US showing mild intrahepatic dilation, otherwise impression of hepatic steatosis.  Will hold off of MRCP at this time.    --Will permanently discontinue midostaurin   --Alk phos 492 today    Chemotherapy induced cardiomyopathy  --cardiology follow-up as outpatient in 3 months  --repeat 2D echo on 6/15 with preserved EF of 60%.  However 7/2/18 echo with reduced EF 30-35%  --Echo on 8/15/18 was within normal range EF of 60-65%    30 minutes were spent face to face with the patient and her  family to discuss the disease, natural history, treatment options and survival statistics. I have provided the patient with an opportunity to ask questions and have all questions answered to her satisfaction.       she will return to clinic in 3-4 weeks, but knows to call in the interim if symptoms change or should a problem arise.        Laquita Troy MD  Hematology and Medical Oncology  Bone Marrow Transplant  [facility]

## 2018-12-04 ENCOUNTER — TELEPHONE (OUTPATIENT)
Dept: HEMATOLOGY/ONCOLOGY | Facility: CLINIC | Age: 68
End: 2018-12-04

## 2018-12-04 DIAGNOSIS — C93.01 ACUTE MONOCYTIC LEUKEMIA IN REMISSION: Primary | ICD-10-CM

## 2018-12-05 ENCOUNTER — DOCUMENTATION ONLY (OUTPATIENT)
Dept: HEMATOLOGY/ONCOLOGY | Facility: CLINIC | Age: 68
End: 2018-12-05

## 2018-12-05 NOTE — PROGRESS NOTES
Consult received to arrange for Rollator. Referral for Rollator made to [facility] ([phone]) (per pts. Request). Spoke with Dena, all necessary information given and orders faxed to the office ([phone]). Equipment to be delivered to pts. Home. No other needs identified.

## 2018-12-27 ENCOUNTER — HOSPITAL ENCOUNTER (OUTPATIENT)
Facility: HOSPITAL | Age: 68
Discharge: HOME | End: 2018-12-27
Attending: INTERNAL MEDICINE | Admitting: INTERNAL MEDICINE
Payer: MEDICARE

## 2018-12-27 ENCOUNTER — ANESTHESIA EVENT (OUTPATIENT)
Dept: SURGERY | Facility: HOSPITAL | Age: 68
End: 2018-12-27
Payer: MEDICARE

## 2018-12-27 ENCOUNTER — ANESTHESIA (OUTPATIENT)
Dept: SURGERY | Facility: HOSPITAL | Age: 68
End: 2018-12-27
Payer: MEDICARE

## 2018-12-27 ENCOUNTER — OFFICE VISIT (OUTPATIENT)
Dept: HEMATOLOGY/ONCOLOGY | Facility: CLINIC | Age: 68
End: 2018-12-27
Payer: MEDICARE

## 2018-12-27 VITALS
HEIGHT: 63 IN | BODY MASS INDEX: 25.58 KG/M2 | TEMPERATURE: 99 F | HEART RATE: 71 BPM | DIASTOLIC BLOOD PRESSURE: 60 MMHG | SYSTOLIC BLOOD PRESSURE: 134 MMHG | WEIGHT: 144.38 LBS

## 2018-12-27 VITALS
OXYGEN SATURATION: 100 % | HEART RATE: 63 BPM | TEMPERATURE: 98 F | WEIGHT: 143 LBS | BODY MASS INDEX: 25.34 KG/M2 | HEIGHT: 63 IN | SYSTOLIC BLOOD PRESSURE: 131 MMHG | DIASTOLIC BLOOD PRESSURE: 59 MMHG | RESPIRATION RATE: 17 BRPM

## 2018-12-27 DIAGNOSIS — C92.00 AML (ACUTE MYELOID LEUKEMIA): ICD-10-CM

## 2018-12-27 DIAGNOSIS — Z86.19 HISTORY OF CLOSTRIDIUM DIFFICILE INFECTION: ICD-10-CM

## 2018-12-27 DIAGNOSIS — R79.89 ELEVATED LFTS: ICD-10-CM

## 2018-12-27 DIAGNOSIS — C92.00 ACUTE MYELOID LEUKEMIA NOT HAVING ACHIEVED REMISSION: Primary | ICD-10-CM

## 2018-12-27 DIAGNOSIS — Z86.79 HISTORY OF VENTRICULAR FIBRILLATION: ICD-10-CM

## 2018-12-27 LAB — BONE MARROW IRON STAIN COMMENT: NORMAL

## 2018-12-27 PROCEDURE — 88341 TISSUE SPECIMEN TO PATHOLOGY, BONE MARROW ASPIRATION/BIOPSY PROCEDURE: ICD-10-PCS | Mod: 26,59,, | Performed by: PATHOLOGY

## 2018-12-27 PROCEDURE — 88264 CHROMOSOME ANALYSIS 20-25: CPT

## 2018-12-27 PROCEDURE — 85097 TISSUE SPECIMEN TO PATHOLOGY, BONE MARROW ASPIRATION/BIOPSY PROCEDURE: ICD-10-PCS | Mod: ,,, | Performed by: PATHOLOGY

## 2018-12-27 PROCEDURE — D9220A PRA ANESTHESIA: Mod: ANES,,, | Performed by: ANESTHESIOLOGY

## 2018-12-27 PROCEDURE — 99999 PR PBB SHADOW E&M-EST. PATIENT-LVL III: CPT | Mod: PBBFAC,,, | Performed by: INTERNAL MEDICINE

## 2018-12-27 PROCEDURE — 25000003 PHARM REV CODE 250: Performed by: INTERNAL MEDICINE

## 2018-12-27 PROCEDURE — D9220A PRA ANESTHESIA: Mod: CRNA,,, | Performed by: NURSE ANESTHETIST, CERTIFIED REGISTERED

## 2018-12-27 PROCEDURE — 81450 HL NEO GSAP 5-50DNA/DNA&RNA: CPT

## 2018-12-27 PROCEDURE — 38222 DX BONE MARROW BX & ASPIR: CPT | Mod: LT,,, | Performed by: NURSE PRACTITIONER

## 2018-12-27 PROCEDURE — 88184 FLOWCYTOMETRY/ TC 1 MARKER: CPT | Performed by: PATHOLOGY

## 2018-12-27 PROCEDURE — 88271 CYTOGENETICS DNA PROBE: CPT | Mod: 59

## 2018-12-27 PROCEDURE — 37000009 HC ANESTHESIA EA ADD 15 MINS: Performed by: INTERNAL MEDICINE

## 2018-12-27 PROCEDURE — 88342 IMHCHEM/IMCYTCHM 1ST ANTB: CPT | Performed by: PATHOLOGY

## 2018-12-27 PROCEDURE — 38222 PR BONE MARROW BIOPSY(IES) W/ASPIRATION(S); DIAGNOSTIC: ICD-10-PCS | Mod: LT,,, | Performed by: NURSE PRACTITIONER

## 2018-12-27 PROCEDURE — 88313 TISSUE SPECIMEN TO PATHOLOGY, BONE MARROW ASPIRATION/BIOPSY PROCEDURE: ICD-10-PCS | Mod: 26,,, | Performed by: PATHOLOGY

## 2018-12-27 PROCEDURE — 37000008 HC ANESTHESIA 1ST 15 MINUTES: Performed by: INTERNAL MEDICINE

## 2018-12-27 PROCEDURE — 88313 SPECIAL STAINS GROUP 2: CPT

## 2018-12-27 PROCEDURE — 71000044 HC DOSC ROUTINE RECOVERY FIRST HOUR: Performed by: INTERNAL MEDICINE

## 2018-12-27 PROCEDURE — 36000704 HC OR TIME LEV I 1ST 15 MIN: Performed by: INTERNAL MEDICINE

## 2018-12-27 PROCEDURE — 88305 TISSUE EXAM BY PATHOLOGIST: CPT | Mod: 26,,, | Performed by: PATHOLOGY

## 2018-12-27 PROCEDURE — 88342 IMHCHEM/IMCYTCHM 1ST ANTB: CPT | Mod: 26,59,, | Performed by: PATHOLOGY

## 2018-12-27 PROCEDURE — 88189 FLOWCYTOMETRY/READ 16 & >: CPT | Mod: ,,, | Performed by: PATHOLOGY

## 2018-12-27 PROCEDURE — 88311 TISSUE SPECIMEN TO PATHOLOGY, BONE MARROW ASPIRATION/BIOPSY PROCEDURE: ICD-10-PCS | Mod: 26,,, | Performed by: PATHOLOGY

## 2018-12-27 PROCEDURE — 63600175 PHARM REV CODE 636 W HCPCS: Performed by: NURSE ANESTHETIST, CERTIFIED REGISTERED

## 2018-12-27 PROCEDURE — 99215 OFFICE O/P EST HI 40 MIN: CPT | Mod: S$PBB,,, | Performed by: INTERNAL MEDICINE

## 2018-12-27 PROCEDURE — 25000003 PHARM REV CODE 250: Performed by: NURSE ANESTHETIST, CERTIFIED REGISTERED

## 2018-12-27 PROCEDURE — 71000015 HC POSTOP RECOV 1ST HR: Performed by: INTERNAL MEDICINE

## 2018-12-27 PROCEDURE — 88311 DECALCIFY TISSUE: CPT | Mod: 26,,, | Performed by: PATHOLOGY

## 2018-12-27 PROCEDURE — 88342 TISSUE SPECIMEN TO PATHOLOGY, BONE MARROW ASPIRATION/BIOPSY PROCEDURE: ICD-10-PCS | Mod: 26,59,, | Performed by: PATHOLOGY

## 2018-12-27 PROCEDURE — 88271 CYTOGENETICS DNA PROBE: CPT

## 2018-12-27 PROCEDURE — 88341 IMHCHEM/IMCYTCHM EA ADD ANTB: CPT | Mod: 59 | Performed by: PATHOLOGY

## 2018-12-27 PROCEDURE — 88189 PR  FLOWCYTOMETRY/READ, 16 & > MARKERS: ICD-10-PCS | Mod: ,,, | Performed by: PATHOLOGY

## 2018-12-27 PROCEDURE — 88341 IMHCHEM/IMCYTCHM EA ADD ANTB: CPT | Mod: 26,59,, | Performed by: PATHOLOGY

## 2018-12-27 PROCEDURE — 88275 CYTOGENETICS 100-300: CPT | Mod: 59

## 2018-12-27 PROCEDURE — 88305 TISSUE EXAM BY PATHOLOGIST: CPT | Mod: 59 | Performed by: PATHOLOGY

## 2018-12-27 PROCEDURE — 88313 SPECIAL STAINS GROUP 2: CPT | Mod: 26,,, | Performed by: PATHOLOGY

## 2018-12-27 PROCEDURE — 99213 OFFICE O/P EST LOW 20 MIN: CPT | Mod: PBBFAC,25 | Performed by: INTERNAL MEDICINE

## 2018-12-27 PROCEDURE — 88185 FLOWCYTOMETRY/TC ADD-ON: CPT | Mod: 59 | Performed by: PATHOLOGY

## 2018-12-27 PROCEDURE — 88305 TISSUE SPECIMEN TO PATHOLOGY, BONE MARROW ASPIRATION/BIOPSY PROCEDURE: ICD-10-PCS | Mod: 26,,, | Performed by: PATHOLOGY

## 2018-12-27 PROCEDURE — 85097 BONE MARROW INTERPRETATION: CPT | Mod: ,,, | Performed by: PATHOLOGY

## 2018-12-27 PROCEDURE — 88237 TISSUE CULTURE BONE MARROW: CPT

## 2018-12-27 PROCEDURE — 88275 CYTOGENETICS 100-300: CPT

## 2018-12-27 PROCEDURE — 36000705 HC OR TIME LEV I EA ADD 15 MIN: Performed by: INTERNAL MEDICINE

## 2018-12-27 RX ORDER — PROPOFOL 10 MG/ML
VIAL (ML) INTRAVENOUS
Status: DISCONTINUED | OUTPATIENT
Start: 2018-12-27 | End: 2018-12-27

## 2018-12-27 RX ORDER — OXYCODONE HYDROCHLORIDE 5 MG/1
5 TABLET ORAL EVERY 6 HOURS PRN
Status: DISCONTINUED | OUTPATIENT
Start: 2018-12-27 | End: 2018-12-27 | Stop reason: HOSPADM

## 2018-12-27 RX ORDER — LIDOCAINE HYDROCHLORIDE 10 MG/ML
INJECTION, SOLUTION EPIDURAL; INFILTRATION; INTRACAUDAL; PERINEURAL
Status: DISCONTINUED | OUTPATIENT
Start: 2018-12-27 | End: 2018-12-27 | Stop reason: HOSPADM

## 2018-12-27 RX ORDER — SODIUM CHLORIDE 9 MG/ML
INJECTION, SOLUTION INTRAVENOUS CONTINUOUS PRN
Status: DISCONTINUED | OUTPATIENT
Start: 2018-12-27 | End: 2018-12-27

## 2018-12-27 RX ORDER — LIDOCAINE HCL/PF 100 MG/5ML
SYRINGE (ML) INTRAVENOUS
Status: DISCONTINUED | OUTPATIENT
Start: 2018-12-27 | End: 2018-12-27

## 2018-12-27 RX ORDER — SODIUM CHLORIDE 0.9 % (FLUSH) 0.9 %
3 SYRINGE (ML) INJECTION
Status: DISCONTINUED | OUTPATIENT
Start: 2018-12-27 | End: 2018-12-27 | Stop reason: HOSPADM

## 2018-12-27 RX ORDER — ACETAMINOPHEN 325 MG/1
650 TABLET ORAL EVERY 6 HOURS PRN
Status: DISCONTINUED | OUTPATIENT
Start: 2018-12-27 | End: 2018-12-27 | Stop reason: HOSPADM

## 2018-12-27 RX ORDER — FENTANYL CITRATE 50 UG/ML
25 INJECTION, SOLUTION INTRAMUSCULAR; INTRAVENOUS EVERY 5 MIN PRN
Status: DISCONTINUED | OUTPATIENT
Start: 2018-12-27 | End: 2018-12-27 | Stop reason: HOSPADM

## 2018-12-27 RX ORDER — PROPOFOL 10 MG/ML
VIAL (ML) INTRAVENOUS CONTINUOUS PRN
Status: DISCONTINUED | OUTPATIENT
Start: 2018-12-27 | End: 2018-12-27

## 2018-12-27 RX ADMIN — PROPOFOL 70 MG: 10 INJECTION, EMULSION INTRAVENOUS at 10:12

## 2018-12-27 RX ADMIN — SODIUM CHLORIDE: 0.9 INJECTION, SOLUTION INTRAVENOUS at 10:12

## 2018-12-27 RX ADMIN — PROPOFOL 175 MCG/KG/MIN: 10 INJECTION, EMULSION INTRAVENOUS at 10:12

## 2018-12-27 RX ADMIN — LIDOCAINE HYDROCHLORIDE 20 MG: 20 INJECTION, SOLUTION INTRAVENOUS at 10:12

## 2018-12-27 NOTE — TRANSFER OF CARE
"Anesthesia Transfer of Care Note    Patient: Sabrina Mac    Procedure(s) Performed: Procedure(s) (LRB):  Biopsy-bone marrow (Left)    Patient location: [facility]    Anesthesia Type: general    Transport from OR: Transported from OR on room air with adequate spontaneous ventilation    Post pain: adequate analgesia    Post assessment: no apparent anesthetic complications    Post vital signs: stable    Level of consciousness: responds to stimulation and awake    Nausea/Vomiting: no nausea/vomiting    Complications: none    Transfer of care protocol was followed      Last vitals:   Visit Vitals  BP (!) 145/67 (BP Location: Right arm, Patient Position: Lying)   Pulse 66   Temp 36.7 °C (98.1 °F) (Temporal)   Resp 17   Ht 5' 3" (1.6 m)   Wt 64.9 kg (143 lb)   LMP  (LMP Unknown)   SpO2 100%   Breastfeeding? No   BMI 25.33 kg/m²     "

## 2018-12-27 NOTE — ANESTHESIA PREPROCEDURE EVALUATION
12/27/2018  Sabrina Mac is a 68 y.o., female.    Anesthesia Evaluation    I have reviewed the Patient Summary Reports.    I have reviewed the Nursing Notes.   I have reviewed the Medications.     Review of Systems  Anesthesia Hx:  No problems with previous Anesthesia  History of prior surgery of interest to airway management or planning: Denies Family Hx of Anesthesia complications.   Denies Personal Hx of Anesthesia complications.   Hematology/Oncology:     Oncology Normal    -- Anemia: Hematology Comments: Pancytopenia    EENT/Dental:EENT/Dental Normal   Cardiovascular:   Exercise tolerance: good CHF ECG has been reviewed.    Pulmonary:  Pulmonary Normal    Renal/:  Renal/ Normal     Hepatic/GI:   GERD    Musculoskeletal:  Musculoskeletal Normal    Neurological:  Neurology Normal    Endocrine:  Endocrine Normal    Dermatological:  Skin Normal    Psych:   anxiety          Physical Exam  General:  Well nourished    Airway/Jaw/Neck:  Airway Findings: Mouth Opening: Normal Tongue: Normal  General Airway Assessment: Adult  Mallampati: II  TM Distance: Normal, at least 6 cm        Eyes/Ears/Nose:  EYES/EARS/NOSE FINDINGS: Normal   Dental:  DENTAL FINDINGS: Normal   Chest/Lungs:  Chest/Lungs Clear    Heart/Vascular:  Heart Findings: Normal Heart murmur: negative Vascular Findings: Normal    Abdomen:  Abdomen Findings: Normal    Musculoskeletal:  Musculoskeletal Findings: Normal   Skin:  Skin Findings: Normal    Mental Status:  Mental Status Findings: Normal        Anesthesia Plan  Type of Anesthesia, risks & benefits discussed:  Anesthesia Type:  general  Patient's Preference:   Intra-op Monitoring Plan: standard ASA monitors  Intra-op Monitoring Plan Comments:   Post Op Pain Control Plan: per primary service following discharge from PACU  Post Op Pain Control Plan Comments:   Induction:   IV  Beta  Blocker:  Patient is on a Beta-Blocker and has received one dose within the past 24 hours (No further documentation required).       Informed Consent: Patient understands risks and agrees with Anesthesia plan.  Questions answered. Anesthesia consent signed with patient.  ASA Score: 2     Day of Surgery Review of History & Physical:    H&P update referred to the provider.         Ready For Surgery From Anesthesia Perspective.

## 2018-12-27 NOTE — INTERVAL H&P NOTE
The patient has been examined and the H&P has been reviewed: Ok to proceed with BMBX.    I concur with the findings and no changes have occurred since H&P was written.    Anesthesia/Surgery risks, benefits and alternative options discussed and understood by patient/family.          Active Hospital Problems    Diagnosis  POA    AML (acute myeloid leukemia) [C92.00]  Yes      Resolved Hospital Problems   No resolved problems to display.

## 2018-12-27 NOTE — DISCHARGE SUMMARY
[facility]  Bone Marrow Transplant  Discharge Summary      Patient Name: Sabrina Mac  MRN: [id number]  Admission Date: 12/27/2018  Hospital Length of Stay: 0 days  Discharge Date and Time: 12/27/2018 11:43 AM  Attending Physician: Laquita Troy MD   Discharging Provider: Aminata Mckenzie NP  Primary Care Provider: Primary Doctor No      Procedure(s) (LRB):  Biopsy-bone marrow (Left)     Hospital Course: Patient admitted to pre op today for a bone marrow aspiration and biopsy. Pt was consented for a bone marrow biopsy. Patient was sedated per anesthesia and a bone marrow biopsy and aspiration was performed in the OR (see procedure note). Patient was then transferred to post op and discharged home when appropriate per anesthesia.         Pending Diagnostic Studies:     Procedure Component Value Units Date/Time    Acute Myeloid Leukemia, FISH, Bone Marrow [[id number]] Collected:  12/27/18 1041    Order Status:  Sent Lab Status:  In process Updated:  12/27/18 1041    Specimen:  Bone Marrow     Bone Marrow Prep and Stain [[id number]] Collected:  12/27/18 1040    Order Status:  Sent Lab Status:  In process Updated:  12/27/18 1041    Specimen:  Bone Marrow     Chromosome Analysis, Bone Marrow [[id number]] Collected:  12/27/18 1041    Order Status:  Sent Lab Status:  In process Updated:  12/27/18 1041    Specimen:  Bone Marrow     Iron Stain, Bone Marrow [[id number]] Collected:  12/27/18 1040    Order Status:  Sent Lab Status:  In process Updated:  12/27/18 1041    Specimen:  Bone Marrow     Leukemia/Lymphoma Screen - Bone Marrow Left Posterior Iliac Crest [[id number]] Collected:  12/27/18 1041    Order Status:  Sent Lab Status:  In process Updated:  12/27/18 1041    Specimen:  Bone Marrow     OncoHeme (NGS) Hematologic Neoplasms, BM Diagnosis or Indication for test: aml [[id number]] Collected:  12/27/18 1041    Order Status:  Sent Lab Status:  In process Updated:  12/27/18 1041    Specimen:  Bone  Marrow     Tissue Specimen to Pathology, Bone Marrow Aspiration/Biopsy Procedure [[id number]] Collected:  12/27/18 1041    Order Status:  Sent Lab Status:  No result     Specimen:  Bone Marrow Core Bx, Left Iliac Crest         Final Active Diagnoses:    Diagnosis Date Noted POA    AML (acute myeloid leukemia) [C92.00] 12/27/2018 Yes      Problems Resolved During this Admission:      Discharged Condition: stable    Disposition: Home or Self Care    Follow Up: with Dr. Troy in clinic    Patient Instructions:      Notify your health care provider if you experience any of the following:  temperature >100.4     Notify your health care provider if you experience any of the following:  severe uncontrolled pain     Notify your health care provider if you experience any of the following:  redness, tenderness, or signs of infection (pain, swelling, redness, odor or green/yellow discharge around incision site)     Remove dressing in 24 hours     Activity as tolerated     Medications:  Reconciled Home Medications:      Medication List      ASK your doctor about these medications    acyclovir 200 MG capsule  Commonly known as:  ZOVIRAX  Take 2 capsules (400 mg total) by mouth 2 (two) times daily.     ALPRAZolam 0.25 MG tablet  Commonly known as:  XANAX  Take 1 tablet (0.25 mg total) by mouth 3 (three) times daily as needed for Anxiety.     ciprofloxacin HCl 500 MG tablet  Commonly known as:  CIPRO  Take 1 tablet (500 mg total) by mouth every 12 (twelve) hours.     DUKE'S SOLUTION  Take 10 mLs by mouth 4 (four) times daily.     escitalopram oxalate 10 MG tablet  Commonly known as:  LEXAPRO  Take 10 mg by mouth once daily.     fluconazole 200 MG Tab  Commonly known as:  DIFLUCAN  Take 2 tablets (400 mg total) by mouth once daily.     LORazepam 0.5 MG tablet  Commonly known as:  ATIVAN  Take 1 tablet (0.5 mg total) by mouth every 6 (six) hours as needed (nausea).     magnesium oxide 400 mg (241.3 mg magnesium) tablet  Commonly  known as:  MAG-OX  Take 2 tablets (800 mg total) by mouth 2 (two) times daily.     metoprolol succinate 25 MG 24 hr tablet  Commonly known as:  TOPROL-XL  Take 0.5 tablets (12.5 mg total) by mouth once daily.     midostaurin 25 mg Cap  Commonly known as:  RYDAPT  Take 50 mg by mouth 2 (two) times daily on days 8 to 21 of cycle     omeprazole 20 MG tablet  Commonly known as:  PRILOSEC OTC  Take 20 mg by mouth every morning.     ondansetron 8 MG tablet  Commonly known as:  ZOFRAN  Take 1 tablet (8 mg total) by mouth every 12 (twelve) hours as needed for Nausea.     prochlorperazine 10 MG tablet  Commonly known as:  COMPAZINE  Take 1 tablet (10 mg total) by mouth 4 (four) times daily.     vitamin D 1000 units Tab  Commonly known as:  VITAMIN D3  Take 1 tablet (1,000 Units total) by mouth once daily.     walker Misc  Commonly known as:  ULTRA-LIGHT ROLLATOR  1 Units by Misc.(Non-Drug; Combo Route) route once daily.            Aminata Mckenzie NP  Bone Marrow Transplant  [facility]

## 2018-12-27 NOTE — ANESTHESIA POSTPROCEDURE EVALUATION
"Anesthesia Post Evaluation    Patient: Sabrina Mac    Procedure(s) Performed: Procedure(s) (LRB):  Biopsy-bone marrow (Left)    Final Anesthesia Type: general  Patient location during evaluation: PACU  Patient participation: Yes- Able to Participate  Level of consciousness: awake and alert and oriented  Post-procedure vital signs: reviewed and stable  Pain management: adequate  Airway patency: patent  PONV status at discharge: No PONV  Anesthetic complications: no      Cardiovascular status: blood pressure returned to baseline  Respiratory status: unassisted, room air and spontaneous ventilation  Hydration status: euvolemic  Follow-up not needed.        Visit Vitals  BP (!) 145/67 (BP Location: Right arm, Patient Position: Lying)   Pulse 66   Temp 36.7 °C (98.1 °F) (Temporal)   Resp 17   Ht 5' 3" (1.6 m)   Wt 64.9 kg (143 lb)   LMP  (LMP Unknown)   SpO2 100%   Breastfeeding? No   BMI 25.33 kg/m²       Pain/Tariq Score: No Data Recorded      "

## 2018-12-27 NOTE — DISCHARGE INSTRUCTIONS
Discharge instructions for having a Bone Marrow Aspiration / Biopsy    Keep Bandage in place for 24 hours.  - Do not shower or take a tube bath during this time. (you may sponge bathe).  - Call the nurse or physician for excessive bleeding or pain at biopsy site.  - You may take Tylenol as needed for pain.    You have received medication to sedate you.  - Do not drive a car or operate heavy machinery for the rest of the day.  - You may resume other activities as tolerated.    You can call [phone] for any problems during the hours of 8:00 AM-5:00PM.    For an emergency after 5:00 PM you can call [phone] and have the  page the Hematologist / Oncologist on call.

## 2018-12-27 NOTE — PROGRESS NOTES
Hematology and Medical Oncology   Follow Up Note     12/27/2018    Primary Oncologic Diagnosis: AML, FLT 3 + and NPMN1+.   History of Present Ilness:   Sabrina Badillo) is a pleasant 68 y.o.female presents to clinic following 2 induction therapies for AML. She was in the hospital roughly 50 days to receive 7+3 and then FLAG JENNIFER.    Oncology History:   68 y.o. female admitted overnight for possible new AML. Came in from OSH with elevated WBC of 100.   05/25/2018: Pt is now on hydrea 2 grams BID, allopurinol 300 mg daily, and IVF. Pt may need rasburicase this weekend. She will undergo a BM bx and aspiration today. She is an induction candidate and will not be screened for research trials. She has anxiety for which she usually takes xanax, this was re-started.   05/26/2018 PICC placed. Reports she slept well last night. Anxiety improved with prn xanax. EF 65%, HIV and Hep panel negative. 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  6/12/2018: Day 15 of 7+3 induction, restaging BM bx done yesterday. On Midostaurin. Fever yesterday and BC with gram + cocci in clusters. On cefepime day 2 and will start Vanc today. Labial mucositis improving.   6/13/2018: Day 16 7+3. BC with staph aureus, identification pending. Surveillance blood cultures done today. Afebrile x 48 hours. On cefepime and Vanc. Labial mucositis resolved. No complaints of pain. Nausea controlled. No diarrhea. Primary complaint is fatigue.   6/14/20018: Day 17 of 7+3. Day 14 bone marrow results pending. BC with staph epidermis only sensitive to Vancomycin. Vanc day 3 and cefepime day 4. Vanc trough 12.2 last night. BP remains low but stable. Afebrile x 72 hours.   6/15/2018: Day 18 of 7+3. Day 14 bone marrow biopsy shows persistent disease with 15% blasts. Discussion with patient regarding treatment and she is deciding if she wants to proceed with re-induction vs outpatient maintenance. Temp of 100.4 overnight, unsustained. Day 5 Cefepime and Day 4 of  Vanc for staph epidermis. Repeat cultures NGTD. BP improved. Electrolytes (mag, phos, K and calcium) replaced aggressively this am and will repeat labs this afternoon. No complaints this am.   06/16/2018: Day 19 of 7+3. Day 2 of Flag-JENNIFER. Remains afebrile. Calcium remains low and have increased PO supplementation. Remains on vanc and aztreonam. Somewhat loopy feeling after receiving benadryl.   06/17/2018: Day 20 of 7+3. Day 3 of FLAG-JENNIFER. Multiple electrolyte abnormalities. New bilateral leg and feet swelling.   6/18/2018: Day 21 of 7+3 and Day 4 of FLAG JENNIFER. Tolerating well with some nausea controlled with Zofran. VSS, afebrile. ANC 1900 on Neupogen. Continues Vanc for staph epi bacteremia. Multiple electrolytes replaced today. Complains of edema to lower extremities, not improved after 20 of lasix yesterday. No sob or CP.   6/19/18: Day 22 of 7+3 and Day 5 of FLAG JENNIFER. VSS, afebrile, ANC 3900. Vanc trough elevated and dose adjusted this am. Lower extremity edema improved after 40 of lasix yesterday, net negative 300 cc I&O. Mild nausea, no abdominal pain or diarrhea. Poor appetite but no difficulty eating or taking pills.   6/20/18: Day 23 of 7+3 and Day 6 of FLAG JENNIFER. Patient complains of nausea and vomiting overnight and this am, especially when taking pills. Will add Zyprexa daily in addition to Zofran, compazine, and ativan PRN and will change meds to IV. 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 Afebrile.  No diarrhea, mouth sores or pain.  06/21/2018: Day 24 of 7+3 and Day 7 of FLAG JENNIFER. Nausea better controlled. Continued on Vanc.  6/22/2018: Day 25 of 7+3 and Day 8 of FLAG JENNIFER. Nausea improved some with Zyprexa but did have 1 episode of emesis overnight.continuing meds IV due to vomiting. Remains afebrile. ANC 0. Continues Vanc and Flagyl. Electrolytes replaced.   06/23/2018 : day 26 of 7+3 and Day 9 of FLAG JENNIFER.  Nausea persists,  worsened after taking pills so meds converted to IV.  Encouraged ambulation.  Continue with flagyl for leptotrichia goodfellowii.  RUQ US showed intrahepatic dilation, overall impression hepatic steatosis.  CBD 0.5cm.  No Gallbladder.    06/24/2018 : day 27 of 7+3 and Day 10 of FLAG JENNIFER.  Nausea persisting, able to tolerate some po pills.  Last day of flagyl (day 5).  Still pancytopenic. Appetite still low as po intake triggers nausea.  06/25/2018: day 28 of 7+3 and Day 11 of FLAG JENNIFER. Afebrile, ANC 0. Has completed Flagyl. Will decrease Vanc to 1000 mg q12, trough 19.9, will complete Vanc on 6/27. Liver enzymes stable on Midostaurin. VSS.   6/26/2018: day 29 of 7+3 and day 12 of FLAG JENNIFER. Liver enzymes improved and Midostaurin increased to full dose 50 mg bid. Nausea controlled, afebrile, VSS, NEON.  6/27/2018: day 30 of 7+3 and Day 13 of FLAG JENNIFER. Persistent nausea and emesis x 1 yesterday. Compazine changed to scheduled. Vanc ends today. Afebrile, VSS. Aggressive electrolyte replacement, low electrolytes possibly due to Midostaurin.   6/28/2018: day 31 of 7+3 and Day 14 of FLAG JENNIFER. Nausea improved with scheduled Compazine. Patient has agreed to start converting some IV meds to po. VSS, afebrile, electrolytes wnl today. Only complains of fatigue, new rash noted to upper back and chest and legs, papular rash mildly red.  6/29/2018: Day 32 of 7+3 and Day 15 of FLAG JENNIFER. Day 14 restaging bone marrow biopsy done at bedside today. Patient states nausea improved but she did have emesis last night after taking 9 pm pills. Rash improved. No mouth sores, diarrhea or pain.   7/2/2018: Day 35 of 7+3 and Day 18 of FLAG JENNIFER. Restaging BM bx results pending. Fluid overload over the weekend. Chest x-ray with pulmonary edema. Os sats down to 88%. Placed on O2 at 2 L NC, off O2 this am. Received lasix. Net negative 2.7 L today. 1 episode of nausea, no emesis. Reports anxiety relieved with PRN meds. Electrolytes improved,  afebrile, VSS.   7/3/2018: Day 36 of 7+3 and Day 19 of FLAG JENNIFER. Restaging Bm bx with GABRIEL. Cardiology consulted for abnormal echo, EF 30% with pulmonary HTN and severe L atrial enlargement. EKG with T wave abnormality, possible anterolateral ischemia and prolonged QTc of 530. Medications adjusted. Nausea controlled, no diarrhea. Electrolytes improved. On O2 as needed.   07/04/2018 : Day 37 of 7+3 and Day 20 of FLAG JENNIFER Diarrhea in AM, watery, no associated abdominal pain, nausea, or vomiting.    07/05/2018: Day 38 of 7+3 ane Day 21 of FLAG JENNIFER. No CP or SOB, cardiology following. On RA. All meds changed to po, denies nausea or vomiting and no diarrhea. VSS, afebrile.   7/6/2018: Day 22 of FLAG JENNIFER. Continues to tolerate po meds with no nausea. Afebrile. Awaiting count recovery for discharge.  7/7/2018-/8/2018: Day 40/41 of 7+3, Day 23/24 of FLAG JENNIFER.  Improving clinically.  Started on mag oxide per patient request.  Labs showing signs of ANC recovery.  7/9/2018: Day 25 FLAG JENNIFER, , continues Neupogen. Received platelets today. Afebrile, VSS. Tolerating all oral meds with no nausea or vomiting. Only complains of fatigue.   7/10/2018: Day 26 FLAG JENNIFER,  today. Had 2 episodes of vomiting and diarrhea last night. Feeling better. Afebrile, VSS.   7/11/2018: Day 27 FLAG JENNIFER,  today. No vomiting or diarrhea overnight and no nausea. Afebrile, VSS. Complains of back pain (bone pain) suspect due to count recovery. Relieved with oxy IR and Zyrtec started.  07/12/2018: Day 28 flag jennifer, engrafted today with ANC of 730. Back pain improved with zyrtec. Scheduled for IT chemo tomorrow at 1:30 pm and discharge home thereafter. Will likely need plt transfusion prior to IT chemo tomorrow   7/13/2018: Day 29 of FLAG JENNIFER. ANC up to 1300 today. Transfusing platelets today prior to LP for IT chemo. Patient continues to complain of bone pain, mostly to back and legs. No nausea, diarrhea, sob. Afebrile and VSS.    --hospitalized 7/23-7/25 for C.diff diarrhea, neutropenic fever.  While inpatient she developed pink blisters on her palm that were concerning for relapse.  --palm biopsy on 7/26/18 for suspicion of leukemia cutis was also negative for sign of relapse  --bone marrow biopsy 7/31/18 was negative for signs of relapse   --Admitted on 8/14/18 for HiDAC#1  --Admitted on 9/11/18 for HiDAC#2  --Admitted on 10/16/18 for HiDAC#3    Interval History:   Ms. Wilkes looks excellent. She feels great, she is eating and has no specific complaints today. She spent the week with the [place] in [place].    Past Medical History:   Past Medical History:   Diagnosis Date    Cancer 03/2018    AML    CHF (congestive heart failure)     Diarrhea 9/14/2018    Encounter for blood transfusion        Current Medications:   Current Outpatient Medications   Medication Sig    acyclovir (ZOVIRAX) 200 MG capsule Take 2 capsules (400 mg total) by mouth 2 (two) times daily.    ciprofloxacin HCl (CIPRO) 500 MG tablet Take 1 tablet (500 mg total) by mouth every 12 (twelve) hours.    escitalopram oxalate (LEXAPRO) 10 MG tablet Take 10 mg by mouth once daily.    fluconazole (DIFLUCAN) 200 MG Tab Take 2 tablets (400 mg total) by mouth once daily.    LORazepam (ATIVAN) 0.5 MG tablet Take 1 tablet (0.5 mg total) by mouth every 6 (six) hours as needed (nausea).    magnesium oxide (MAG-OX) 400 mg tablet Take 2 tablets (800 mg total) by mouth 2 (two) times daily.    metoprolol succinate (TOPROL-XL) 25 MG 24 hr tablet Take 0.5 tablets (12.5 mg total) by mouth once daily.    midostaurin (RYDAPT) 25 mg Cap Take 50 mg by mouth 2 (two) times daily on days 8 to 21 of cycle    nystatin (DUKE'S SOLUTION) Take 10 mLs by mouth 4 (four) times daily.    omeprazole (PRILOSEC OTC) 20 MG tablet Take 20 mg by mouth every morning.    ondansetron (ZOFRAN) 8 MG tablet Take 1 tablet (8 mg total) by mouth every 12 (twelve) hours as needed for  Nausea.    prochlorperazine (COMPAZINE) 10 MG tablet Take 1 tablet (10 mg total) by mouth 4 (four) times daily.    vitamin D 1000 units Tab Take 1 tablet (1,000 Units total) by mouth once daily.    walker (ULTRA-LIGHT ROLLATOR) Misc 1 Units by Misc.(Non-Drug; Combo Route) route once daily.    ALPRAZolam (XANAX) 0.25 MG tablet Take 1 tablet (0.25 mg total) by mouth 3 (three) times daily as needed for Anxiety.     No current facility-administered medications for this visit.      ALLERGIES:   Review of patient's allergies indicates:   Allergen Reactions    Methotrexate analogues      Elevated Liver Enzyme    Bactrim [sulfamethoxazole-trimethoprim] Nausea And Vomiting and Rash       Review of Systems:     Review of Systems   Constitutional: Positive for appetite change and fatigue. Negative for chills, diaphoresis, fever and unexpected weight change.   HENT:   Negative for hearing loss, mouth sores, nosebleeds, sore throat, trouble swallowing and voice change.    Eyes: Negative for eye problems and icterus.   Respiratory: Negative for chest tightness, cough, hemoptysis, shortness of breath and wheezing.    Cardiovascular: Negative for chest pain, leg swelling and palpitations.   Gastrointestinal: Negative for abdominal distention, abdominal pain, blood in stool, diarrhea, nausea and vomiting.   Endocrine: Negative for hot flashes.   Genitourinary: Negative for bladder incontinence, difficulty urinating, dysuria and hematuria.    Musculoskeletal: Negative for arthralgias, back pain, flank pain, gait problem, myalgias, neck pain and neck stiffness.   Skin: Negative for itching, rash and wound.   Neurological: Negative for dizziness, extremity weakness, gait problem, headaches, numbness, seizures and speech difficulty.   Hematological: Negative for adenopathy. Bruises/bleeds easily.   Psychiatric/Behavioral: Positive for sleep disturbance. Negative for confusion and depression. The patient is nervous/anxious.          Physical Exam:     Vitals:    12/27/18 0916   BP: 134/60   Pulse: 71   Temp: 98.7 °F (37.1 °C)       Physical Exam   Constitutional: She is oriented to person, place, and time. She appears well-developed and well-nourished. No distress.   HENT:   Head: Normocephalic and atraumatic.   Mouth/Throat: Oropharynx is clear and moist. No oropharyngeal exudate.   Hair has regrown     Eyes: Conjunctivae and EOM are normal. Pupils are equal, round, and reactive to light.   Neck: Normal range of motion. Neck supple. No JVD present. No tracheal deviation present. No thyromegaly present.   Cardiovascular: Normal rate, regular rhythm and normal heart sounds. Exam reveals no friction rub.   No murmur heard.  Pulmonary/Chest: Effort normal and breath sounds normal. No stridor. No respiratory distress. She has no wheezes. She has no rales. She exhibits no tenderness.   Abdominal: Soft. Bowel sounds are normal. She exhibits no distension. There is no tenderness. There is no rebound and no guarding.   Musculoskeletal: Normal range of motion. She exhibits no edema, tenderness or deformity.   Lymphadenopathy:     She has no axillary adenopathy.   Neurological: She is alert and oriented to person, place, and time. She displays normal reflexes. No cranial nerve deficit or sensory deficit. She exhibits normal muscle tone. Coordination normal.   Skin: Skin is warm and dry. Capillary refill takes less than 2 seconds. No rash noted. She is not diaphoretic. No erythema. No pallor.   Psychiatric: She has a normal mood and affect. Her behavior is normal. Judgment and thought content normal.       ECOG Performance Status: (foot note - ECOG PS provided by Eastern Cooperative Oncology Group) 2 - Symptomatic, <50% confined to bed    Karnofsky Performance Score:  70%- Cares for Self: Unable to Carry on Normal Activity or Active Work    Labs:   Lab Results   Component Value Date    WBC 3.48 (L) 12/27/2018    HGB 9.4 (L) 12/27/2018    HCT 29.2 (L)  12/27/2018    PLT 92 (L) 12/27/2018    ALT 18 12/27/2018    AST 26 12/27/2018     12/27/2018    K 4.5 12/27/2018     12/27/2018    CREATININE 0.9 12/27/2018    BUN 15 12/27/2018    CO2 29 12/27/2018    INR 1.1 05/31/2018       Imaging: previous imaging has been reviewed    Assessment and Plan:     Mrs. Mac is a pleasant 68 year old female with recently diagnosed AML.      Acute Monocytic Leukemia  -- Admitted from [facility] on 5/25/18 early AM with WBC around 100s, for suspected new non-M3 AML  --lo res HLA typing on 5/26/18 and hi res on 5/27/18 done in anticipation of possible need for future transplant   --Pt with two daughters, two full sisters (in their mid 60s, one with brain aneurysm hx, other one without any medical issues), and one full brother (61 y/o healthy).  --NPM1 +, CEBPA (-), FLT3 (+).  On Midostaurin 50 mg PO BID until Day 14 (held starting 6/8 to 6/11). Stopped when FLAG JENNIFER re-induction started. Restarted Midostaurin at 25 mg bid on day 8 of FLAG JENNIFER induction (6/22), increase to full dose 50 mg bid (6/26) as improvement in liver enzymes. Stopped 7/3/18 due to abnormal cardiac echo.  --day 14 bone marrow biopsy completed 6/11 showing persistent disease with 15% CD 34 positive blasts  --Day 60 of FLAG-JENNIFER, tolerating without difficulty except nausea/vomiting  --day 14 restaging bone marrow biopsy done 6/29 without complication, results with no evidence of AML, FLT 3 negative, will need repeat marrow at count recovery outpatient   --recovery marrow showed no evidence of disease  --HiDAC #1 on 8/14/18, HiDAC#2 on 9/11/18  --Cycle 3 delayed 1 week due to pancytopenia. Started on 10/16/18  --recovery bone marrow planned in the OR today  --Will follow up results in 2 weeks      Chemotherapy related anemia/thromboctopenia   ----Now resolved. Previllously checked locally twice a week  --We are able to transfuse blood locally  --Does not need blood today      Suspected Leukemia  Cutis  --pathology from dermatology confirmed inflammatory changes  --now resolved with topical steroid cream    Abnormal Liver Function Tests  --ALT and AST and Alk phos have again increased substantially since re-starting midostaurin.   --midostaurin started day 8 at 25 mg bid, monitoring liver enzymes closely and improved. Increasing Midostaurin to 50 mg bid 6/26. Stopped Midostaurin (7/3) due to new diagnosis of systolic heart failure EF 35%.  --6/22/18 liver US showing mild intrahepatic dilation, otherwise impression of hepatic steatosis.  Will hold off of MRCP at this time.    --Will permanently discontinue midostaurin     Chemotherapy induced cardiomyopathy  --cardiology follow-up as outpatient in 3 months  --repeat 2D echo on 6/15 with preserved EF of 60%.  However 7/2/18 echo with reduced EF 30-35%  --Echo on 8/15/18 was within normal range EF of 60-65%    30 minutes were spent face to face with the patient and her  family to discuss the disease, natural history, treatment options and survival statistics. I have provided the patient with an opportunity to ask questions and have all questions answered to her satisfaction.       she will return to clinic in 2 weeks, but knows to call in the interim if symptoms change or should a problem arise.        Laquita Troy MD  Hematology and Medical Oncology  Bone Marrow Transplant  [facility]

## 2018-12-27 NOTE — PROCEDURES
PROCEDURE NOTE:  Bone Marrow Biopsy  Date: 12/27/18  Indication: AML  Consent: Informed consent was obtained from patient.  Timeout: Done and documented.  Site: Left posterior illiac crest.  Position: R lateral   Prep: Betadine.  Needle used: 11 gauge Jamshidi needle.  Anesthetic: 1% lidocaine 5 cc.  Biopsy: The biopsy needle was introduced into the marrow cavity and an aspirate was obtained without complications after 2 attempts and sent for flow cytometry, AML FISH, NGS, and cytogenetics. Core biopsy x2 obtained without difficulty and sent for routine histologic examination.  Complications: None.  Disposition: The patient was discharged home when appropriate per anesthesia.  Minimal blood loss    Aminata Mckenzie DNP, FNP  Hematology/Bone Marrow Transplant

## 2018-12-28 ENCOUNTER — PATIENT MESSAGE (OUTPATIENT)
Dept: HEMATOLOGY/ONCOLOGY | Facility: CLINIC | Age: 68
End: 2018-12-28

## 2018-12-31 LAB
AML FISH ADDITIONAL INFORMATION (BM): NORMAL
AML FISH DISCLAIMER (BM): NORMAL
AML FISH REASON FOR REFERRAL (BM): NORMAL
AML FISH RELEASED BY (BM): NORMAL
AML FISH RESULT (BM): NORMAL
AML FISH RESULT SUMMARY (BM): NORMAL
AML FISH RESULT TABLE (BM): NORMAL
BONE MARROW WRIGHT STAIN COMMENT: NORMAL
CHROM BANDING METHOD: NORMAL
CHROMOSOME ANALYSIS BM ADDITIONAL INFORMATION: NORMAL
CHROMOSOME ANALYSIS BM RELEASED BY: NORMAL
CHROMOSOME ANALYSIS BM RESULT SUMMARY: NORMAL
CLINICAL CYTOGENETICIST REVIEW: NORMAL
CLINICAL CYTOGENETICIST REVIEW: NORMAL
FAMLB SPECIMEN: NORMAL
KARYOTYP MAR: NORMAL
REASON FOR REFERRAL (NARRATIVE): NORMAL
REF LAB TEST METHOD: NORMAL
REF LAB TEST METHOD: NORMAL
SPECIMEN SOURCE: NORMAL
SPECIMEN SOURCE: NORMAL
SPECIMEN: NORMAL

## 2019-01-03 ENCOUNTER — OFFICE VISIT (OUTPATIENT)
Dept: HEMATOLOGY/ONCOLOGY | Facility: CLINIC | Age: 69
End: 2019-01-03
Payer: MEDICARE

## 2019-01-03 ENCOUNTER — LAB VISIT (OUTPATIENT)
Dept: LAB | Facility: HOSPITAL | Age: 69
End: 2019-01-03
Attending: INTERNAL MEDICINE
Payer: MEDICARE

## 2019-01-03 VITALS
OXYGEN SATURATION: 99 % | SYSTOLIC BLOOD PRESSURE: 111 MMHG | HEART RATE: 80 BPM | HEIGHT: 63 IN | WEIGHT: 146.63 LBS | TEMPERATURE: 98 F | RESPIRATION RATE: 16 BRPM | BODY MASS INDEX: 25.98 KG/M2 | DIASTOLIC BLOOD PRESSURE: 57 MMHG

## 2019-01-03 DIAGNOSIS — C93.01 ACUTE MONOCYTIC LEUKEMIA IN REMISSION: Primary | ICD-10-CM

## 2019-01-03 DIAGNOSIS — C92.00 AML (ACUTE MYELOBLASTIC LEUKEMIA): ICD-10-CM

## 2019-01-03 DIAGNOSIS — R79.89 ELEVATED LFTS: ICD-10-CM

## 2019-01-03 DIAGNOSIS — Z86.19 HISTORY OF CLOSTRIDIUM DIFFICILE INFECTION: ICD-10-CM

## 2019-01-03 LAB
ALBUMIN SERPL BCP-MCNC: 3.4 G/DL
ALP SERPL-CCNC: 102 U/L
ALT SERPL W/O P-5'-P-CCNC: 16 U/L
ANION GAP SERPL CALC-SCNC: 11 MMOL/L
AST SERPL-CCNC: 25 U/L
BILIRUB SERPL-MCNC: 0.3 MG/DL
BUN SERPL-MCNC: 13 MG/DL
CALCIUM SERPL-MCNC: 9.7 MG/DL
CHLORIDE SERPL-SCNC: 102 MMOL/L
CO2 SERPL-SCNC: 25 MMOL/L
CREAT SERPL-MCNC: 1 MG/DL
ERYTHROCYTE [DISTWIDTH] IN BLOOD BY AUTOMATED COUNT: 17.3 %
EST. GFR  (AFRICAN AMERICAN): >60 ML/MIN/1.73 M^2
EST. GFR  (NON AFRICAN AMERICAN): 58.4 ML/MIN/1.73 M^2
GLUCOSE SERPL-MCNC: 107 MG/DL
HCT VFR BLD AUTO: 29.4 %
HGB BLD-MCNC: 9.3 G/DL
IMM GRANULOCYTES # BLD AUTO: 0.01 K/UL
MCH RBC QN AUTO: 32.9 PG
MCHC RBC AUTO-ENTMCNC: 31.6 G/DL
MCV RBC AUTO: 104 FL
NEUTROPHILS # BLD AUTO: 1.9 K/UL
PLATELET # BLD AUTO: 78 K/UL
PMV BLD AUTO: 10.6 FL
POTASSIUM SERPL-SCNC: 4 MMOL/L
PROT SERPL-MCNC: 6.3 G/DL
RBC # BLD AUTO: 2.83 M/UL
SODIUM SERPL-SCNC: 138 MMOL/L
WBC # BLD AUTO: 3.82 K/UL

## 2019-01-03 PROCEDURE — 36415 COLL VENOUS BLD VENIPUNCTURE: CPT

## 2019-01-03 PROCEDURE — 80053 COMPREHEN METABOLIC PANEL: CPT

## 2019-01-03 PROCEDURE — 85027 COMPLETE CBC AUTOMATED: CPT

## 2019-01-03 PROCEDURE — 99214 OFFICE O/P EST MOD 30 MIN: CPT | Mod: PBBFAC | Performed by: INTERNAL MEDICINE

## 2019-01-03 PROCEDURE — 99215 OFFICE O/P EST HI 40 MIN: CPT | Mod: S$PBB,,, | Performed by: INTERNAL MEDICINE

## 2019-01-03 PROCEDURE — 99999 PR PBB SHADOW E&M-EST. PATIENT-LVL IV: CPT | Mod: PBBFAC,,, | Performed by: INTERNAL MEDICINE

## 2019-01-03 PROCEDURE — 99215 PR OFFICE/OUTPT VISIT, EST, LEVL V, 40-54 MIN: ICD-10-PCS | Mod: S$PBB,,, | Performed by: INTERNAL MEDICINE

## 2019-01-03 PROCEDURE — 99999 PR PBB SHADOW E&M-EST. PATIENT-LVL IV: ICD-10-PCS | Mod: PBBFAC,,, | Performed by: INTERNAL MEDICINE

## 2019-01-03 NOTE — Clinical Note
1. See me on 1/31 at 1pm with shola,cmp[okay to double book] can you please block out the 4:30 spot to allow for the earlier double bookings

## 2019-01-07 NOTE — ASSESSMENT & PLAN NOTE
- phos wnl at 3.7 today  - replace per electrolyte protocol   · POA, depression and suicidal ideation  · Was maintained on Celexa and Seroquel but stopped these 6 months ago, restarted here  · Recently took a "handful" of Klonopin and Xanax due to his depression  · Has been hospitalized in inpatient psych in the past, approximately one year ago  · Psychiatry consult performed  · Currently, feels improved and denies suicidal ideation  · He signed a 201 and will be going to [facility] for inpatient psych for further care  · Medically cleared for discharge to psychiatric facility

## 2019-01-08 NOTE — PROGRESS NOTES
Hematology and Medical Oncology   Follow Up Note     1/3/2019    Primary Oncologic Diagnosis: AML, FLT 3 + and NPMN1+.   History of Present Ilness:   Sabrina Badillo) is a pleasant 68 y.o.female presents to clinic following 2 induction therapies for AML. She was in the hospital roughly 50 days to receive 7+3 and then FLAG JENNIFER.    Oncology History:   68 y.o. female admitted overnight for possible new AML. Came in from OSH with elevated WBC of 100.   05/25/2018: Pt is now on hydrea 2 grams BID, allopurinol 300 mg daily, and IVF. Pt may need rasburicase this weekend. She will undergo a BM bx and aspiration today. She is an induction candidate and will not be screened for research trials. She has anxiety for which she usually takes xanax, this was re-started.   05/26/2018 PICC placed. Reports she slept well last night. Anxiety improved with prn xanax. EF 65%, HIV and Hep panel negative. 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  6/12/2018: Day 15 of 7+3 induction, restaging BM bx done yesterday. On Midostaurin. Fever yesterday and BC with gram + cocci in clusters. On cefepime day 2 and will start Vanc today. Labial mucositis improving.   6/13/2018: Day 16 7+3. BC with staph aureus, identification pending. Surveillance blood cultures done today. Afebrile x 48 hours. On cefepime and Vanc. Labial mucositis resolved. No complaints of pain. Nausea controlled. No diarrhea. Primary complaint is fatigue.   6/14/20018: Day 17 of 7+3. Day 14 bone marrow results pending. BC with staph epidermis only sensitive to Vancomycin. Vanc day 3 and cefepime day 4. Vanc trough 12.2 last night. BP remains low but stable. Afebrile x 72 hours.   6/15/2018: Day 18 of 7+3. Day 14 bone marrow biopsy shows persistent disease with 15% blasts. Discussion with patient regarding treatment and she is deciding if she wants to proceed with re-induction vs outpatient maintenance. Temp of 100.4 overnight, unsustained. Day 5 Cefepime and Day 4 of Vanc  for staph epidermis. Repeat cultures NGTD. BP improved. Electrolytes (mag, phos, K and calcium) replaced aggressively this am and will repeat labs this afternoon. No complaints this am.   06/16/2018: Day 19 of 7+3. Day 2 of Flag-JENNIFER. Remains afebrile. Calcium remains low and have increased PO supplementation. Remains on vanc and aztreonam. Somewhat loopy feeling after receiving benadryl.   06/17/2018: Day 20 of 7+3. Day 3 of FLAG-JENNIFER. Multiple electrolyte abnormalities. New bilateral leg and feet swelling.   6/18/2018: Day 21 of 7+3 and Day 4 of FLAG JENNIFER. Tolerating well with some nausea controlled with Zofran. VSS, afebrile. ANC 1900 on Neupogen. Continues Vanc for staph epi bacteremia. Multiple electrolytes replaced today. Complains of edema to lower extremities, not improved after 20 of lasix yesterday. No sob or CP.   6/19/18: Day 22 of 7+3 and Day 5 of FLAG JENNIFER. VSS, afebrile, ANC 3900. Vanc trough elevated and dose adjusted this am. Lower extremity edema improved after 40 of lasix yesterday, net negative 300 cc I&O. Mild nausea, no abdominal pain or diarrhea. Poor appetite but no difficulty eating or taking pills.   6/20/18: Day 23 of 7+3 and Day 6 of FLAG JENNIFER. Patient complains of nausea and vomiting overnight and this am, especially when taking pills. Will add Zyprexa daily in addition to Zofran, compazine, and ativan PRN and will change meds to IV. 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 Afebrile.  No diarrhea, mouth sores or pain.  06/21/2018: Day 24 of 7+3 and Day 7 of FLAG JENNIFER. Nausea better controlled. Continued on Vanc.  6/22/2018: Day 25 of 7+3 and Day 8 of FLAG JENNIFER. Nausea improved some with Zyprexa but did have 1 episode of emesis overnight.continuing meds IV due to vomiting. Remains afebrile. ANC 0. Continues Vanc and Flagyl. Electrolytes replaced.   06/23/2018 : day 26 of 7+3 and Day 9 of FLAG JENNIFER.  Nausea persists, worsened  after taking pills so meds converted to IV.  Encouraged ambulation.  Continue with flagyl for leptotrichia goodfellowii.  RUQ US showed intrahepatic dilation, overall impression hepatic steatosis.  CBD 0.5cm.  No Gallbladder.    06/24/2018 : day 27 of 7+3 and Day 10 of FLAG JENNIFER.  Nausea persisting, able to tolerate some po pills.  Last day of flagyl (day 5).  Still pancytopenic. Appetite still low as po intake triggers nausea.  06/25/2018: day 28 of 7+3 and Day 11 of FLAG JENNIFER. Afebrile, ANC 0. Has completed Flagyl. Will decrease Vanc to 1000 mg q12, trough 19.9, will complete Vanc on 6/27. Liver enzymes stable on Midostaurin. VSS.   6/26/2018: day 29 of 7+3 and day 12 of FLAG JENNIFER. Liver enzymes improved and Midostaurin increased to full dose 50 mg bid. Nausea controlled, afebrile, VSS, NEON.  6/27/2018: day 30 of 7+3 and Day 13 of FLAG JENNIFER. Persistent nausea and emesis x 1 yesterday. Compazine changed to scheduled. Vanc ends today. Afebrile, VSS. Aggressive electrolyte replacement, low electrolytes possibly due to Midostaurin.   6/28/2018: day 31 of 7+3 and Day 14 of FLAG JENINFER. Nausea improved with scheduled Compazine. Patient has agreed to start converting some IV meds to po. VSS, afebrile, electrolytes wnl today. Only complains of fatigue, new rash noted to upper back and chest and legs, papular rash mildly red.  6/29/2018: Day 32 of 7+3 and Day 15 of FLAG JENNIFER. Day 14 restaging bone marrow biopsy done at bedside today. Patient states nausea improved but she did have emesis last night after taking 9 pm pills. Rash improved. No mouth sores, diarrhea or pain.   7/2/2018: Day 35 of 7+3 and Day 18 of FLAG JENNIFER. Restaging BM bx results pending. Fluid overload over the weekend. Chest x-ray with pulmonary edema. Os sats down to 88%. Placed on O2 at 2 L NC, off O2 this am. Received lasix. Net negative 2.7 L today. 1 episode of nausea, no emesis. Reports anxiety relieved with PRN meds. Electrolytes improved, afebrile, VSS.    7/3/2018: Day 36 of 7+3 and Day 19 of FLAG JENNIFER. Restaging Bm bx with GABRIEL. Cardiology consulted for abnormal echo, EF 30% with pulmonary HTN and severe L atrial enlargement. EKG with T wave abnormality, possible anterolateral ischemia and prolonged QTc of 530. Medications adjusted. Nausea controlled, no diarrhea. Electrolytes improved. On O2 as needed.   07/04/2018 : Day 37 of 7+3 and Day 20 of FLAG JENNIFER Diarrhea in AM, watery, no associated abdominal pain, nausea, or vomiting.    07/05/2018: Day 38 of 7+3 ane Day 21 of FLAG JENNIFER. No CP or SOB, cardiology following. On RA. All meds changed to po, denies nausea or vomiting and no diarrhea. VSS, afebrile.   7/6/2018: Day 22 of FLAG JENNIFER. Continues to tolerate po meds with no nausea. Afebrile. Awaiting count recovery for discharge.  7/7/2018-/8/2018: Day 40/41 of 7+3, Day 23/24 of FLAG JENNIFER.  Improving clinically.  Started on mag oxide per patient request.  Labs showing signs of ANC recovery.  7/9/2018: Day 25 FLAG JENNIFER, , continues Neupogen. Received platelets today. Afebrile, VSS. Tolerating all oral meds with no nausea or vomiting. Only complains of fatigue.   7/10/2018: Day 26 FLAG JENNIFER,  today. Had 2 episodes of vomiting and diarrhea last night. Feeling better. Afebrile, VSS.   7/11/2018: Day 27 FLAG JENNIFER,  today. No vomiting or diarrhea overnight and no nausea. Afebrile, VSS. Complains of back pain (bone pain) suspect due to count recovery. Relieved with oxy IR and Zyrtec started.  07/12/2018: Day 28 flag jennifer, engrafted today with ANC of 730. Back pain improved with zyrtec. Scheduled for IT chemo tomorrow at 1:30 pm and discharge home thereafter. Will likely need plt transfusion prior to IT chemo tomorrow   7/13/2018: Day 29 of FLAG JENNIFER. ANC up to 1300 today. Transfusing platelets today prior to LP for IT chemo. Patient continues to complain of bone pain, mostly to back and legs. No nausea, diarrhea, sob. Afebrile and VSS.   --hospitalized  7/23-7/25 for C.diff diarrhea, neutropenic fever.  While inpatient she developed pink blisters on her palm that were concerning for relapse.  --palm biopsy on 7/26/18 for suspicion of leukemia cutis was also negative for sign of relapse  --bone marrow biopsy 7/31/18 was negative for signs of relapse   --Admitted on 8/14/18 for HiDAC#1  --Admitted on 9/11/18 for HiDAC#2  --Admitted on 10/16/18 for HiDAC#3  --Post consolidation bone marrow biopsy on 12/27/2018: AML FISH (BM) Interpretation: The result is within normal limits for the AML FISH panel. No evidence of residual disease.    Interval History:   Ms. Wilkes looks excellent. She feels great, she is eating and has no specific complaints today. She is feeling more and more like her old self. Her hair is regrowing.    Past Medical History:   Past Medical History:   Diagnosis Date    Cancer 03/2018    AML    CHF (congestive heart failure)     Diarrhea 9/14/2018    Encounter for blood transfusion        Current Medications:   Current Outpatient Medications   Medication Sig    acyclovir (ZOVIRAX) 200 MG capsule Take 2 capsules (400 mg total) by mouth 2 (two) times daily.    ciprofloxacin HCl (CIPRO) 500 MG tablet Take 1 tablet (500 mg total) by mouth every 12 (twelve) hours.    escitalopram oxalate (LEXAPRO) 10 MG tablet Take 10 mg by mouth once daily.    fluconazole (DIFLUCAN) 200 MG Tab Take 2 tablets (400 mg total) by mouth once daily.    LORazepam (ATIVAN) 0.5 MG tablet Take 1 tablet (0.5 mg total) by mouth every 6 (six) hours as needed (nausea).    magnesium oxide (MAG-OX) 400 mg tablet Take 2 tablets (800 mg total) by mouth 2 (two) times daily.    metoprolol succinate (TOPROL-XL) 25 MG 24 hr tablet Take 0.5 tablets (12.5 mg total) by mouth once daily.    midostaurin (RYDAPT) 25 mg Cap Take 50 mg by mouth 2 (two) times daily on days 8 to 21 of cycle    nystatin (DUKE'S SOLUTION) Take 10 mLs by mouth 4 (four) times daily.    omeprazole (PRILOSEC  OTC) 20 MG tablet Take 20 mg by mouth every morning.    ondansetron (ZOFRAN) 8 MG tablet Take 1 tablet (8 mg total) by mouth every 12 (twelve) hours as needed for Nausea.    prochlorperazine (COMPAZINE) 10 MG tablet Take 1 tablet (10 mg total) by mouth 4 (four) times daily.    vitamin D 1000 units Tab Take 1 tablet (1,000 Units total) by mouth once daily.    walker (ULTRA-LIGHT ROLLATOR) Misc 1 Units by Misc.(Non-Drug; Combo Route) route once daily.    ALPRAZolam (XANAX) 0.25 MG tablet Take 1 tablet (0.25 mg total) by mouth 3 (three) times daily as needed for Anxiety.     No current facility-administered medications for this visit.      ALLERGIES:   Review of patient's allergies indicates:   Allergen Reactions    Methotrexate analogues      Elevated Liver Enzyme    Bactrim [sulfamethoxazole-trimethoprim] Nausea And Vomiting and Rash       Review of Systems:     Review of Systems   Constitutional: Positive for appetite change and fatigue. Negative for chills, diaphoresis, fever and unexpected weight change.   HENT:   Negative for hearing loss, mouth sores, nosebleeds, sore throat, trouble swallowing and voice change.    Eyes: Negative for eye problems and icterus.   Respiratory: Negative for chest tightness, cough, hemoptysis, shortness of breath and wheezing.    Cardiovascular: Negative for chest pain, leg swelling and palpitations.   Gastrointestinal: Negative for abdominal distention, abdominal pain, blood in stool, diarrhea, nausea and vomiting.   Endocrine: Negative for hot flashes.   Genitourinary: Negative for bladder incontinence, difficulty urinating, dysuria and hematuria.    Musculoskeletal: Negative for arthralgias, back pain, flank pain, gait problem, myalgias, neck pain and neck stiffness.   Skin: Negative for itching, rash and wound.   Neurological: Negative for dizziness, extremity weakness, gait problem, headaches, numbness, seizures and speech difficulty.   Hematological: Negative for  adenopathy. Bruises/bleeds easily.   Psychiatric/Behavioral: Positive for sleep disturbance. Negative for confusion and depression. The patient is nervous/anxious.         Physical Exam:     Vitals:    01/03/19 1332   BP: (!) 111/57   Pulse: 80   Resp: 16   Temp: 98.4 °F (36.9 °C)       Physical Exam   Constitutional: She is oriented to person, place, and time. She appears well-developed and well-nourished. No distress.   HENT:   Head: Normocephalic and atraumatic.   Mouth/Throat: Oropharynx is clear and moist. No oropharyngeal exudate.   Hair has regrown     Eyes: Conjunctivae and EOM are normal. Pupils are equal, round, and reactive to light.   Neck: Normal range of motion. Neck supple. No JVD present. No tracheal deviation present. No thyromegaly present.   Cardiovascular: Normal rate, regular rhythm and normal heart sounds. Exam reveals no friction rub.   No murmur heard.  Pulmonary/Chest: Effort normal and breath sounds normal. No stridor. No respiratory distress. She has no wheezes. She has no rales. She exhibits no tenderness.   Abdominal: Soft. Bowel sounds are normal. She exhibits no distension. There is no tenderness. There is no rebound and no guarding.   Musculoskeletal: Normal range of motion. She exhibits no edema, tenderness or deformity.   Lymphadenopathy:     She has no axillary adenopathy.   Neurological: She is alert and oriented to person, place, and time. She displays normal reflexes. No cranial nerve deficit or sensory deficit. She exhibits normal muscle tone. Coordination normal.   Skin: Skin is warm and dry. Capillary refill takes less than 2 seconds. No rash noted. She is not diaphoretic. No erythema. No pallor.   Psychiatric: She has a normal mood and affect. Her behavior is normal. Judgment and thought content normal.       ECOG Performance Status: (foot note - ECOG PS provided by Eastern Cooperative Oncology Group) 2 - Symptomatic, <50% confined to bed    Karnofsky Performance Score:   70%- Cares for Self: Unable to Carry on Normal Activity or Active Work    Labs:   Lab Results   Component Value Date    WBC 3.82 (L) 01/03/2019    HGB 9.3 (L) 01/03/2019    HCT 29.4 (L) 01/03/2019    PLT 78 (L) 01/03/2019    ALT 16 01/03/2019    AST 25 01/03/2019     01/03/2019    K 4.0 01/03/2019     01/03/2019    CREATININE 1.0 01/03/2019    BUN 13 01/03/2019    CO2 25 01/03/2019    INR 1.1 05/31/2018       Imaging: previous imaging has been reviewed    Assessment and Plan:     Mrs. Mac is a pleasant 68 year old female with recently diagnosed AML.      Acute Monocytic Leukemia  -- Admitted from [facility]/18 early AM with WBC around 100s, for suspected new non-M3 AML  --lo res HLA typing on 5/26/18 and hi res on 5/27/18 done in anticipation of possible need for future transplant   --Pt with two daughters, two full sisters (in their mid 60s, one with brain aneurysm hx, other one without any medical issues), and one full brother (61 y/o healthy).  --NPM1 +, CEBPA (-), FLT3 (+).  On Midostaurin 50 mg PO BID until Day 14 (held starting 6/8 to 6/11). Stopped when FLAG JENNIFER re-induction started. Restarted Midostaurin at 25 mg bid on day 8 of FLAG JENNIFER induction (6/22), increase to full dose 50 mg bid (6/26) as improvement in liver enzymes. Stopped 7/3/18 due to abnormal cardiac echo.  --day 14 bone marrow biopsy completed 6/11 showing persistent disease with 15% CD 34 positive blasts  --Day 60 of FLAG-JENNIFER, tolerating without difficulty except nausea/vomiting  --day 14 restaging bone marrow biopsy done 6/29 without complication, results with no evidence of AML, FLT 3 negative, will need repeat marrow at count recovery outpatient   --recovery marrow showed no evidence of disease  --HiDAC #1 on 8/14/18, HiDAC#2 on 9/11/18  --Cycle 3 delayed 1 week due to pancytopenia. Started on 10/16/18  --recovery bone marrow showed no signs of residual leukemia  --We will continue to follow twice weekly  labs      Chemotherapy related anemia/thromboctopenia   ----Now resolved. Previllously checked locally twice a week  --We are able to transfuse blood locally  --Does not need blood today      Suspected Leukemia Cutis  --pathology from dermatology confirmed inflammatory changes  --now resolved with topical steroid cream    Abnormal Liver Function Tests  --ALT and AST and Alk phos have again increased substantially since re-starting midostaurin.   --midostaurin started day 8 at 25 mg bid, monitoring liver enzymes closely and improved. Increasing Midostaurin to 50 mg bid 6/26. Stopped Midostaurin (7/3) due to new diagnosis of systolic heart failure EF 35%.  --6/22/18 liver US showing mild intrahepatic dilation, otherwise impression of hepatic steatosis.  Will hold off of MRCP at this time.    --Will permanently discontinue midostaurin     Chemotherapy induced cardiomyopathy  --cardiology follow-up as outpatient in 3 months  --repeat 2D echo on 6/15 with preserved EF of 60%.  However 7/2/18 echo with reduced EF 30-35%  --Echo on 8/15/18 was within normal range EF of 60-65%    30 minutes were spent face to face with the patient and her  family to discuss the disease, natural history, treatment options and survival statistics. I have provided the patient with an opportunity to ask questions and have all questions answered to her satisfaction.       she will return to the local clinic in 2 weeks and to see us in 4 weeks, but knows to call in the interim if symptoms change or should a problem arise.        Laquita Troy MD  Hematology and Medical Oncology  Bone Marrow Transplant  [facility]

## 2019-01-11 LAB
ANNOTATION COMMENT IMP: NORMAL
NGS CLINCIAL TRIALS: NORMAL
NGS INDICATION OF TEST: NORMAL
NGS INTERPRETATION: NORMAL
NGS ONCOHEME PANEL GENE LIST: NORMAL
NGS PATHOGENIC MUTATIONS DETECTED: NORMAL
NGS REVIEWED BY:: NORMAL
NGS VARIANTS OF UNKNOWN SIGNIFICANCE: NORMAL
NGSHM RESULT, BONE MARROW: NORMAL
REF LAB TEST METHOD: NORMAL
SPECIMEN SOURCE: NORMAL
TEST PERFORMANCE INFO SPEC: NORMAL

## 2019-01-31 ENCOUNTER — OFFICE VISIT (OUTPATIENT)
Dept: HEMATOLOGY/ONCOLOGY | Facility: CLINIC | Age: 69
End: 2019-01-31
Payer: MEDICARE

## 2019-01-31 ENCOUNTER — LAB VISIT (OUTPATIENT)
Dept: LAB | Facility: HOSPITAL | Age: 69
End: 2019-01-31
Attending: INTERNAL MEDICINE
Payer: MEDICARE

## 2019-01-31 VITALS
DIASTOLIC BLOOD PRESSURE: 60 MMHG | BODY MASS INDEX: 27.22 KG/M2 | RESPIRATION RATE: 16 BRPM | SYSTOLIC BLOOD PRESSURE: 131 MMHG | OXYGEN SATURATION: 99 % | TEMPERATURE: 99 F | WEIGHT: 147.94 LBS | HEART RATE: 80 BPM | HEIGHT: 62 IN

## 2019-01-31 DIAGNOSIS — C92.00 AML (ACUTE MYELOBLASTIC LEUKEMIA): ICD-10-CM

## 2019-01-31 DIAGNOSIS — M79.604 PAIN IN BOTH LOWER EXTREMITIES: ICD-10-CM

## 2019-01-31 DIAGNOSIS — M79.605 PAIN IN BOTH LOWER EXTREMITIES: ICD-10-CM

## 2019-01-31 DIAGNOSIS — C92.00 ACUTE MYELOID LEUKEMIA NOT HAVING ACHIEVED REMISSION: Primary | ICD-10-CM

## 2019-01-31 DIAGNOSIS — Z86.19 HISTORY OF CLOSTRIDIUM DIFFICILE INFECTION: ICD-10-CM

## 2019-01-31 DIAGNOSIS — R79.89 ELEVATED LFTS: ICD-10-CM

## 2019-01-31 LAB
ALBUMIN SERPL BCP-MCNC: 3.5 G/DL
ALP SERPL-CCNC: 108 U/L
ALT SERPL W/O P-5'-P-CCNC: 15 U/L
ANION GAP SERPL CALC-SCNC: 10 MMOL/L
AST SERPL-CCNC: 20 U/L
BILIRUB SERPL-MCNC: 0.2 MG/DL
BUN SERPL-MCNC: 14 MG/DL
CALCIUM SERPL-MCNC: 10.2 MG/DL
CHLORIDE SERPL-SCNC: 101 MMOL/L
CO2 SERPL-SCNC: 26 MMOL/L
CREAT SERPL-MCNC: 0.9 MG/DL
ERYTHROCYTE [DISTWIDTH] IN BLOOD BY AUTOMATED COUNT: 13.5 %
EST. GFR  (AFRICAN AMERICAN): >60 ML/MIN/1.73 M^2
EST. GFR  (NON AFRICAN AMERICAN): >60 ML/MIN/1.73 M^2
GLUCOSE SERPL-MCNC: 108 MG/DL
HCT VFR BLD AUTO: 30.7 %
HGB BLD-MCNC: 10.1 G/DL
IMM GRANULOCYTES # BLD AUTO: 0.01 K/UL
MCH RBC QN AUTO: 34.9 PG
MCHC RBC AUTO-ENTMCNC: 32.9 G/DL
MCV RBC AUTO: 106 FL
NEUTROPHILS # BLD AUTO: 1.2 K/UL
PLATELET # BLD AUTO: 64 K/UL
PMV BLD AUTO: 11.1 FL
POTASSIUM SERPL-SCNC: 3.8 MMOL/L
PROT SERPL-MCNC: 6.4 G/DL
RBC # BLD AUTO: 2.89 M/UL
SODIUM SERPL-SCNC: 137 MMOL/L
WBC # BLD AUTO: 2.79 K/UL

## 2019-01-31 PROCEDURE — 99215 OFFICE O/P EST HI 40 MIN: CPT | Mod: S$PBB,,, | Performed by: INTERNAL MEDICINE

## 2019-01-31 PROCEDURE — 36415 COLL VENOUS BLD VENIPUNCTURE: CPT

## 2019-01-31 PROCEDURE — 85027 COMPLETE CBC AUTOMATED: CPT

## 2019-01-31 PROCEDURE — 99999 PR PBB SHADOW E&M-EST. PATIENT-LVL IV: ICD-10-PCS | Mod: PBBFAC,,, | Performed by: INTERNAL MEDICINE

## 2019-01-31 PROCEDURE — 99999 PR PBB SHADOW E&M-EST. PATIENT-LVL IV: CPT | Mod: PBBFAC,,, | Performed by: INTERNAL MEDICINE

## 2019-01-31 PROCEDURE — 99214 OFFICE O/P EST MOD 30 MIN: CPT | Mod: PBBFAC | Performed by: INTERNAL MEDICINE

## 2019-01-31 PROCEDURE — 99215 PR OFFICE/OUTPT VISIT, EST, LEVL V, 40-54 MIN: ICD-10-PCS | Mod: S$PBB,,, | Performed by: INTERNAL MEDICINE

## 2019-01-31 PROCEDURE — 80053 COMPREHEN METABOLIC PANEL: CPT

## 2019-01-31 RX ORDER — OXYCODONE AND ACETAMINOPHEN 7.5; 325 MG/1; MG/1
1 TABLET ORAL EVERY 4 HOURS PRN
Qty: 60 TABLET | Refills: 0 | Status: SHIPPED | OUTPATIENT
Start: 2019-01-31 | End: 2019-04-04 | Stop reason: SDUPTHER

## 2019-01-31 NOTE — Clinical Note
1. Referral to rheumatology [make appointment on same day as my visit]2. See me in 4 weeks with cbc,cmp

## 2019-01-31 NOTE — PROGRESS NOTES
Hematology and Medical Oncology   Follow Up Note     1/31/2019    Primary Oncologic Diagnosis: AML, FLT 3 + and NPMN1+.   History of Present Ilness:   Sabrina Badillo) is a pleasant 68 y.o.female presents to clinic following 2 induction therapies for AML. She was in the hospital roughly 50 days to receive 7+3 and then FLAG JENNIFER.    Oncology History:   68 y.o. female admitted overnight for possible new AML. Came in from OSH with elevated WBC of 100.   05/25/2018: Pt is now on hydrea 2 grams BID, allopurinol 300 mg daily, and IVF. Pt may need rasburicase this weekend. She will undergo a BM bx and aspiration today. She is an induction candidate and will not be screened for research trials. She has anxiety for which she usually takes xanax, this was re-started.   05/26/2018 PICC placed. Reports she slept well last night. Anxiety improved with prn xanax. EF 65%, HIV and Hep panel negative. 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  6/12/2018: Day 15 of 7+3 induction, restaging BM bx done yesterday. On Midostaurin. Fever yesterday and BC with gram + cocci in clusters. On cefepime day 2 and will start Vanc today. Labial mucositis improving.   6/13/2018: Day 16 7+3. BC with staph aureus, identification pending. Surveillance blood cultures done today. Afebrile x 48 hours. On cefepime and Vanc. Labial mucositis resolved. No complaints of pain. Nausea controlled. No diarrhea. Primary complaint is fatigue.   6/14/20018: Day 17 of 7+3. Day 14 bone marrow results pending. BC with staph epidermis only sensitive to Vancomycin. Vanc day 3 and cefepime day 4. Vanc trough 12.2 last night. BP remains low but stable. Afebrile x 72 hours.   6/15/2018: Day 18 of 7+3. Day 14 bone marrow biopsy shows persistent disease with 15% blasts. Discussion with patient regarding treatment and she is deciding if she wants to proceed with re-induction vs outpatient maintenance. Temp of 100.4 overnight, unsustained. Day 5 Cefepime and Day 4 of  Vanc for staph epidermis. Repeat cultures NGTD. BP improved. Electrolytes (mag, phos, K and calcium) replaced aggressively this am and will repeat labs this afternoon. No complaints this am.   06/16/2018: Day 19 of 7+3. Day 2 of Flag-JENNIFER. Remains afebrile. Calcium remains low and have increased PO supplementation. Remains on vanc and aztreonam. Somewhat loopy feeling after receiving benadryl.   06/17/2018: Day 20 of 7+3. Day 3 of FLAG-JENNIFER. Multiple electrolyte abnormalities. New bilateral leg and feet swelling.   6/18/2018: Day 21 of 7+3 and Day 4 of FLAG JENNIFER. Tolerating well with some nausea controlled with Zofran. VSS, afebrile. ANC 1900 on Neupogen. Continues Vanc for staph epi bacteremia. Multiple electrolytes replaced today. Complains of edema to lower extremities, not improved after 20 of lasix yesterday. No sob or CP.   6/19/18: Day 22 of 7+3 and Day 5 of FLAG JENNIFER. VSS, afebrile, ANC 3900. Vanc trough elevated and dose adjusted this am. Lower extremity edema improved after 40 of lasix yesterday, net negative 300 cc I&O. Mild nausea, no abdominal pain or diarrhea. Poor appetite but no difficulty eating or taking pills.   6/20/18: Day 23 of 7+3 and Day 6 of FLAG JENNIFER. Patient complains of nausea and vomiting overnight and this am, especially when taking pills. Will add Zyprexa daily in addition to Zofran, compazine, and ativan PRN and will change meds to IV. 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 Afebrile.  No diarrhea, mouth sores or pain.  06/21/2018: Day 24 of 7+3 and Day 7 of FLAG JENNIFER. Nausea better controlled. Continued on Vanc.  6/22/2018: Day 25 of 7+3 and Day 8 of FLAG JENNIFER. Nausea improved some with Zyprexa but did have 1 episode of emesis overnight.continuing meds IV due to vomiting. Remains afebrile. ANC 0. Continues Vanc and Flagyl. Electrolytes replaced.   06/23/2018 : day 26 of 7+3 and Day 9 of FLAG JENNIFER.  Nausea persists,  worsened after taking pills so meds converted to IV.  Encouraged ambulation.  Continue with flagyl for leptotrichia goodfellowii.  RUQ US showed intrahepatic dilation, overall impression hepatic steatosis.  CBD 0.5cm.  No Gallbladder.    06/24/2018 : day 27 of 7+3 and Day 10 of FLAG JENNIFER.  Nausea persisting, able to tolerate some po pills.  Last day of flagyl (day 5).  Still pancytopenic. Appetite still low as po intake triggers nausea.  06/25/2018: day 28 of 7+3 and Day 11 of FLAG JENNIFER. Afebrile, ANC 0. Has completed Flagyl. Will decrease Vanc to 1000 mg q12, trough 19.9, will complete Vanc on 6/27. Liver enzymes stable on Midostaurin. VSS.   6/26/2018: day 29 of 7+3 and day 12 of FLAG JENNIFER. Liver enzymes improved and Midostaurin increased to full dose 50 mg bid. Nausea controlled, afebrile, VSS, NEON.  6/27/2018: day 30 of 7+3 and Day 13 of FLAG JENNIFER. Persistent nausea and emesis x 1 yesterday. Compazine changed to scheduled. Vanc ends today. Afebrile, VSS. Aggressive electrolyte replacement, low electrolytes possibly due to Midostaurin.   6/28/2018: day 31 of 7+3 and Day 14 of FLAG JENNIFER. Nausea improved with scheduled Compazine. Patient has agreed to start converting some IV meds to po. VSS, afebrile, electrolytes wnl today. Only complains of fatigue, new rash noted to upper back and chest and legs, papular rash mildly red.  6/29/2018: Day 32 of 7+3 and Day 15 of FLAG JENNIFER. Day 14 restaging bone marrow biopsy done at bedside today. Patient states nausea improved but she did have emesis last night after taking 9 pm pills. Rash improved. No mouth sores, diarrhea or pain.   7/2/2018: Day 35 of 7+3 and Day 18 of FLAG JENNIFER. Restaging BM bx results pending. Fluid overload over the weekend. Chest x-ray with pulmonary edema. Os sats down to 88%. Placed on O2 at 2 L NC, off O2 this am. Received lasix. Net negative 2.7 L today. 1 episode of nausea, no emesis. Reports anxiety relieved with PRN meds. Electrolytes improved,  afebrile, VSS.   7/3/2018: Day 36 of 7+3 and Day 19 of FLAG JENNIFER. Restaging Bm bx with GABRIEL. Cardiology consulted for abnormal echo, EF 30% with pulmonary HTN and severe L atrial enlargement. EKG with T wave abnormality, possible anterolateral ischemia and prolonged QTc of 530. Medications adjusted. Nausea controlled, no diarrhea. Electrolytes improved. On O2 as needed.   07/04/2018 : Day 37 of 7+3 and Day 20 of FLAG JENNIFER Diarrhea in AM, watery, no associated abdominal pain, nausea, or vomiting.    07/05/2018: Day 38 of 7+3 ane Day 21 of FLAG JENNIFER. No CP or SOB, cardiology following. On RA. All meds changed to po, denies nausea or vomiting and no diarrhea. VSS, afebrile.   7/6/2018: Day 22 of FLAG JENNIFER. Continues to tolerate po meds with no nausea. Afebrile. Awaiting count recovery for discharge.  7/7/2018-/8/2018: Day 40/41 of 7+3, Day 23/24 of FLAG JENNIFER.  Improving clinically.  Started on mag oxide per patient request.  Labs showing signs of ANC recovery.  7/9/2018: Day 25 FLAG JENNIFER, , continues Neupogen. Received platelets today. Afebrile, VSS. Tolerating all oral meds with no nausea or vomiting. Only complains of fatigue.   7/10/2018: Day 26 FLAG JENNIFER,  today. Had 2 episodes of vomiting and diarrhea last night. Feeling better. Afebrile, VSS.   7/11/2018: Day 27 FLAG JENNIFER,  today. No vomiting or diarrhea overnight and no nausea. Afebrile, VSS. Complains of back pain (bone pain) suspect due to count recovery. Relieved with oxy IR and Zyrtec started.  07/12/2018: Day 28 flag jennifer, engrafted today with ANC of 730. Back pain improved with zyrtec. Scheduled for IT chemo tomorrow at 1:30 pm and discharge home thereafter. Will likely need plt transfusion prior to IT chemo tomorrow   7/13/2018: Day 29 of FLAG JENNIFER. ANC up to 1300 today. Transfusing platelets today prior to LP for IT chemo. Patient continues to complain of bone pain, mostly to back and legs. No nausea, diarrhea, sob. Afebrile and VSS.    --hospitalized 7/23-7/25 for C.diff diarrhea, neutropenic fever.  While inpatient she developed pink blisters on her palm that were concerning for relapse.  --palm biopsy on 7/26/18 for suspicion of leukemia cutis was also negative for sign of relapse  --bone marrow biopsy 7/31/18 was negative for signs of relapse   --Admitted on 8/14/18 for HiDAC#1  --Admitted on 9/11/18 for HiDAC#2  --Admitted on 10/16/18 for HiDAC#3  --Post consolidation bone marrow biopsy on 12/27/2018: AML FISH (BM) Interpretation: The result is within normal limits for the AML FISH panel. No evidence of residual disease.    Interval History:   Ms. Wilkes looks excellent. She feels great, she is eating and returned to driving. She is feeling more and more like her old self. Her only complaint is bilateral leg pain that keeps her awake at night, but is not present during the day.  She has had this pain before with previous rheumatologic issues, which resolved during transplant.    Past Medical History:   Past Medical History:   Diagnosis Date    Cancer 03/2018    AML    CHF (congestive heart failure)     Diarrhea 9/14/2018    Encounter for blood transfusion        Current Medications:   Current Outpatient Medications   Medication Sig    acyclovir (ZOVIRAX) 200 MG capsule Take 2 capsules (400 mg total) by mouth 2 (two) times daily.    ciprofloxacin HCl (CIPRO) 500 MG tablet Take 1 tablet (500 mg total) by mouth every 12 (twelve) hours.    escitalopram oxalate (LEXAPRO) 10 MG tablet Take 10 mg by mouth once daily.    fluconazole (DIFLUCAN) 200 MG Tab Take 2 tablets (400 mg total) by mouth once daily.    LORazepam (ATIVAN) 0.5 MG tablet Take 1 tablet (0.5 mg total) by mouth every 6 (six) hours as needed (nausea).    magnesium oxide (MAG-OX) 400 mg tablet Take 2 tablets (800 mg total) by mouth 2 (two) times daily.    metoprolol succinate (TOPROL-XL) 25 MG 24 hr tablet Take 0.5 tablets (12.5 mg total) by mouth once daily.     midostaurin (RYDAPT) 25 mg Cap Take 50 mg by mouth 2 (two) times daily on days 8 to 21 of cycle    nystatin (DUKE'S SOLUTION) Take 10 mLs by mouth 4 (four) times daily.    omeprazole (PRILOSEC OTC) 20 MG tablet Take 20 mg by mouth every morning.    ondansetron (ZOFRAN) 8 MG tablet Take 1 tablet (8 mg total) by mouth every 12 (twelve) hours as needed for Nausea.    prochlorperazine (COMPAZINE) 10 MG tablet Take 1 tablet (10 mg total) by mouth 4 (four) times daily.    vitamin D 1000 units Tab Take 1 tablet (1,000 Units total) by mouth once daily.    walker (ULTRA-LIGHT ROLLATOR) Misc 1 Units by Misc.(Non-Drug; Combo Route) route once daily.    ALPRAZolam (XANAX) 0.25 MG tablet Take 1 tablet (0.25 mg total) by mouth 3 (three) times daily as needed for Anxiety.    oxyCODONE-acetaminophen (PERCOCET) 7.5-325 mg per tablet Take 1 tablet by mouth every 4 (four) hours as needed for Pain.     No current facility-administered medications for this visit.      ALLERGIES:   Review of patient's allergies indicates:   Allergen Reactions    Methotrexate analogues      Elevated Liver Enzyme    Bactrim [sulfamethoxazole-trimethoprim] Nausea And Vomiting and Rash       Review of Systems:     Review of Systems   Constitutional: Positive for appetite change and fatigue. Negative for chills, diaphoresis, fever and unexpected weight change.   HENT:   Negative for hearing loss, mouth sores, nosebleeds, sore throat, trouble swallowing and voice change.    Eyes: Negative for eye problems and icterus.   Respiratory: Negative for chest tightness, cough, hemoptysis, shortness of breath and wheezing.    Cardiovascular: Negative for chest pain, leg swelling and palpitations.   Gastrointestinal: Negative for abdominal distention, abdominal pain, blood in stool, diarrhea, nausea and vomiting.   Endocrine: Negative for hot flashes.   Genitourinary: Negative for bladder incontinence, difficulty urinating, dysuria and hematuria.     Musculoskeletal: Positive for myalgias. Negative for arthralgias, back pain, flank pain, gait problem, neck pain and neck stiffness.   Skin: Negative for itching, rash and wound.   Neurological: Negative for dizziness, extremity weakness, gait problem, headaches, numbness, seizures and speech difficulty.   Hematological: Negative for adenopathy. Does not bruise/bleed easily.   Psychiatric/Behavioral: Positive for sleep disturbance. Negative for confusion and depression. The patient is nervous/anxious.         Physical Exam:     Vitals:    01/31/19 1347   BP: 131/60   Pulse: 80   Resp: 16   Temp: 98.6 °F (37 °C)       Physical Exam   Constitutional: She is oriented to person, place, and time. She appears well-developed and well-nourished. No distress.   HENT:   Head: Normocephalic and atraumatic.   Mouth/Throat: Oropharynx is clear and moist. No oropharyngeal exudate.   Hair has regrown     Eyes: Conjunctivae and EOM are normal. Pupils are equal, round, and reactive to light.   Neck: Normal range of motion. Neck supple. No JVD present. No tracheal deviation present. No thyromegaly present.   Cardiovascular: Normal rate, regular rhythm and normal heart sounds. Exam reveals no friction rub.   No murmur heard.  Pulmonary/Chest: Effort normal and breath sounds normal. No stridor. No respiratory distress. She has no wheezes. She has no rales. She exhibits no tenderness.   Abdominal: Soft. Bowel sounds are normal. She exhibits no distension. There is no tenderness. There is no rebound and no guarding.   Musculoskeletal: Normal range of motion. She exhibits no edema, tenderness or deformity.   Lymphadenopathy:     She has no axillary adenopathy.   Neurological: She is alert and oriented to person, place, and time. She displays normal reflexes. No cranial nerve deficit or sensory deficit. She exhibits normal muscle tone. Coordination normal.   Skin: Skin is warm and dry. Capillary refill takes less than 2 seconds. No rash  noted. She is not diaphoretic. No erythema. No pallor.   Psychiatric: She has a normal mood and affect. Her behavior is normal. Judgment and thought content normal.       ECOG Performance Status: (foot note - ECOG PS provided by Eastern Cooperative Oncology Group) 2 - Symptomatic, <50% confined to bed    Karnofsky Performance Score:  70%- Cares for Self: Unable to Carry on Normal Activity or Active Work    Labs:   Lab Results   Component Value Date    WBC 2.79 (L) 01/31/2019    HGB 10.1 (L) 01/31/2019    HCT 30.7 (L) 01/31/2019    PLT 64 (L) 01/31/2019    ALT 15 01/31/2019    AST 20 01/31/2019     01/31/2019    K 3.8 01/31/2019     01/31/2019    CREATININE 0.9 01/31/2019    BUN 14 01/31/2019    CO2 26 01/31/2019    INR 1.1 05/31/2018       Imaging: previous imaging has been reviewed    Assessment and Plan:     Mrs. Mac is a pleasant 68 year old female with recently diagnosed AML.      Acute Monocytic Leukemia  -- Admitted from [facility] on 5/25/18 early AM with WBC around 100s, for suspected new non-M3 AML  --lo res HLA typing on 5/26/18 and hi res on 5/27/18 done in anticipation of possible need for future transplant   --Pt with two daughters, two full sisters (in their mid 60s, one with brain aneurysm hx, other one without any medical issues), and one full brother (61 y/o healthy).  --NPM1 +, CEBPA (-), FLT3 (+).  On Midostaurin 50 mg PO BID until Day 14 (held starting 6/8 to 6/11). Stopped when FLAG JENNIFER re-induction started. Restarted Midostaurin at 25 mg bid on day 8 of FLAG JENNIFER induction (6/22), increase to full dose 50 mg bid (6/26) as improvement in liver enzymes. Stopped 7/3/18 due to abnormal cardiac echo.  --day 14 bone marrow biopsy completed 6/11 showing persistent disease with 15% CD 34 positive blasts  --Day 60 of FLAG-JENNIFER, tolerating without difficulty except nausea/vomiting  --day 14 restaging bone marrow biopsy done 6/29 without complication, results with no evidence of AML, FLT  3 negative, will need repeat marrow at count recovery outpatient   --recovery marrow showed no evidence of disease  --HiDAC #1 on 8/14/18, HiDAC#2 on 9/11/18  --Cycle 3 delayed 1 week due to pancytopenia. Started on 10/16/18  --recovery bone marrow showed no signs of residual leukemia  --We will continue to follow labs [id number]      Chemotherapy related anemia/thromboctopenia   ----Now resolved. Previously checked locally twice a week  --We are able to transfuse blood locally  --Does not need blood today      Suspected Leukemia Cutis  --pathology from dermatology confirmed inflammatory changes  --now resolved with topical steroid cream    Abnormal Liver Function Tests  --ALT and AST and Alk phos have again increased substantially since re-starting midostaurin.   --midostaurin started day 8 at 25 mg bid, monitoring liver enzymes closely and improved. Increasing Midostaurin to 50 mg bid 6/26. Stopped Midostaurin (7/3) due to new diagnosis of systolic heart failure EF 35%.  --6/22/18 liver US showing mild intrahepatic dilation, otherwise impression of hepatic steatosis.  Will hold off of MRCP at this time.    --Will permanently discontinue midostaurin     Chemotherapy induced cardiomyopathy  --cardiology follow-up as outpatient in 3 months  --repeat 2D echo on 6/15 with preserved EF of 60%.  However 7/2/18 echo with reduced EF 30-35%  --Echo on 8/15/18 was within normal range EF of 60-65%    Rheumatologic issues  --Previously on steroids and methotrexate  --Will refer to [place]um at [facility] [place]  --We do not have the outside records, but will attempt to obtain them    30 minutes were spent face to face with the patient and her  family to discuss the disease, natural history, treatment options and survival statistics. I have provided the patient with an opportunity to ask questions and have all questions answered to her satisfaction.       she will return to the local clinic in 2 weeks and to see us in 4 weeks, but knows to call in  the interim if symptoms change or should a problem arise.        Laquita Troy MD  Hematology and Medical Oncology  Bone Marrow Transplant  [facility]

## 2019-02-04 DIAGNOSIS — C92.00 ACUTE MYELOID LEUKEMIA NOT HAVING ACHIEVED REMISSION: ICD-10-CM

## 2019-02-04 RX ORDER — CIPROFLOXACIN 500 MG/1
TABLET ORAL
Qty: 60 TABLET | Refills: 3 | Status: SHIPPED | OUTPATIENT
Start: 2019-02-04 | End: 2019-03-13 | Stop reason: SDUPTHER

## 2019-02-18 DIAGNOSIS — C93.00 ACUTE MONOCYTIC LEUKEMIA NOT HAVING ACHIEVED REMISSION: ICD-10-CM

## 2019-02-18 DIAGNOSIS — C92.00 ACUTE MYELOID LEUKEMIA NOT HAVING ACHIEVED REMISSION: ICD-10-CM

## 2019-02-18 RX ORDER — ONDANSETRON HYDROCHLORIDE 8 MG/1
8 TABLET, FILM COATED ORAL EVERY 12 HOURS PRN
Qty: 30 TABLET | Refills: 2 | Status: SHIPPED | OUTPATIENT
Start: 2019-02-18 | End: 2020-02-18

## 2019-02-18 RX ORDER — FLUCONAZOLE 200 MG/1
400 TABLET ORAL DAILY
Qty: 60 TABLET | Refills: 3 | Status: ON HOLD | OUTPATIENT
Start: 2019-02-18 | End: 2019-10-18 | Stop reason: HOSPADM

## 2019-02-20 ENCOUNTER — TELEPHONE (OUTPATIENT)
Dept: HEMATOLOGY/ONCOLOGY | Facility: CLINIC | Age: 69
End: 2019-02-20

## 2019-02-20 DIAGNOSIS — C92.00 ACUTE MYELOID LEUKEMIA NOT HAVING ACHIEVED REMISSION: Primary | ICD-10-CM

## 2019-02-20 NOTE — TELEPHONE ENCOUNTER
Signed case request for BMBx in OR.    Patty Quintana, FNP  Hematology/Oncology/Bone Marrow Transplant

## 2019-02-21 ENCOUNTER — PATIENT MESSAGE (OUTPATIENT)
Dept: SURGERY | Facility: HOSPITAL | Age: 69
End: 2019-02-21

## 2019-02-21 ENCOUNTER — PATIENT MESSAGE (OUTPATIENT)
Dept: RHEUMATOLOGY | Facility: CLINIC | Age: 69
End: 2019-02-21

## 2019-02-27 ENCOUNTER — TELEPHONE (OUTPATIENT)
Dept: HEMATOLOGY/ONCOLOGY | Facility: CLINIC | Age: 69
End: 2019-02-27

## 2019-02-27 NOTE — TELEPHONE ENCOUNTER
Called the pt to confirm the pt's bmbx.  Pt's  said that Dr Troy said the did not have to do the BMBX.

## 2019-03-06 ENCOUNTER — OFFICE VISIT (OUTPATIENT)
Dept: HEMATOLOGY/ONCOLOGY | Facility: CLINIC | Age: 69
End: 2019-03-06
Payer: MEDICARE

## 2019-03-06 ENCOUNTER — LAB VISIT (OUTPATIENT)
Dept: LAB | Facility: HOSPITAL | Age: 69
End: 2019-03-06
Attending: INTERNAL MEDICINE
Payer: MEDICARE

## 2019-03-06 VITALS
HEIGHT: 62 IN | SYSTOLIC BLOOD PRESSURE: 135 MMHG | HEART RATE: 82 BPM | RESPIRATION RATE: 16 BRPM | OXYGEN SATURATION: 99 % | WEIGHT: 147.25 LBS | TEMPERATURE: 99 F | BODY MASS INDEX: 27.1 KG/M2 | DIASTOLIC BLOOD PRESSURE: 67 MMHG

## 2019-03-06 DIAGNOSIS — K12.31 MUCOSITIS DUE TO CHEMOTHERAPY: ICD-10-CM

## 2019-03-06 DIAGNOSIS — Z86.19 HISTORY OF CLOSTRIDIUM DIFFICILE INFECTION: ICD-10-CM

## 2019-03-06 DIAGNOSIS — I42.7 CHEMOTHERAPY INDUCED CARDIOMYOPATHY: ICD-10-CM

## 2019-03-06 DIAGNOSIS — M19.90 ARTHRITIS: Primary | ICD-10-CM

## 2019-03-06 DIAGNOSIS — C92.00 AML (ACUTE MYELOBLASTIC LEUKEMIA): ICD-10-CM

## 2019-03-06 DIAGNOSIS — R79.89 ELEVATED LFTS: ICD-10-CM

## 2019-03-06 DIAGNOSIS — G89.29 OTHER CHRONIC PAIN: ICD-10-CM

## 2019-03-06 DIAGNOSIS — C92.00 ACUTE MYELOID LEUKEMIA NOT HAVING ACHIEVED REMISSION: ICD-10-CM

## 2019-03-06 DIAGNOSIS — T45.1X5A CHEMOTHERAPY INDUCED CARDIOMYOPATHY: ICD-10-CM

## 2019-03-06 LAB
ALBUMIN SERPL BCP-MCNC: 4 G/DL
ALP SERPL-CCNC: 118 U/L
ALT SERPL W/O P-5'-P-CCNC: 42 U/L
ANION GAP SERPL CALC-SCNC: 9 MMOL/L
AST SERPL-CCNC: 38 U/L
BILIRUB SERPL-MCNC: 0.3 MG/DL
BUN SERPL-MCNC: 16 MG/DL
CALCIUM SERPL-MCNC: 10.2 MG/DL
CHLORIDE SERPL-SCNC: 102 MMOL/L
CO2 SERPL-SCNC: 25 MMOL/L
CREAT SERPL-MCNC: 1 MG/DL
ERYTHROCYTE [DISTWIDTH] IN BLOOD BY AUTOMATED COUNT: 12.9 %
EST. GFR  (AFRICAN AMERICAN): >60 ML/MIN/1.73 M^2
EST. GFR  (NON AFRICAN AMERICAN): 58.4 ML/MIN/1.73 M^2
GLUCOSE SERPL-MCNC: 106 MG/DL
HCT VFR BLD AUTO: 32.5 %
HGB BLD-MCNC: 10.5 G/DL
IMM GRANULOCYTES # BLD AUTO: 0.02 K/UL
MCH RBC QN AUTO: 34.3 PG
MCHC RBC AUTO-ENTMCNC: 32.3 G/DL
MCV RBC AUTO: 106 FL
NEUTROPHILS # BLD AUTO: 1.9 K/UL
PLATELET # BLD AUTO: 88 K/UL
PMV BLD AUTO: 10.8 FL
POTASSIUM SERPL-SCNC: 4.2 MMOL/L
PROT SERPL-MCNC: 6.6 G/DL
RBC # BLD AUTO: 3.06 M/UL
SODIUM SERPL-SCNC: 136 MMOL/L
WBC # BLD AUTO: 3.31 K/UL

## 2019-03-06 PROCEDURE — 99215 PR OFFICE/OUTPT VISIT, EST, LEVL V, 40-54 MIN: ICD-10-PCS | Mod: S$PBB,,, | Performed by: INTERNAL MEDICINE

## 2019-03-06 PROCEDURE — 85027 COMPLETE CBC AUTOMATED: CPT

## 2019-03-06 PROCEDURE — 99214 OFFICE O/P EST MOD 30 MIN: CPT | Mod: PBBFAC | Performed by: INTERNAL MEDICINE

## 2019-03-06 PROCEDURE — 36415 COLL VENOUS BLD VENIPUNCTURE: CPT

## 2019-03-06 PROCEDURE — 99999 PR PBB SHADOW E&M-EST. PATIENT-LVL IV: CPT | Mod: PBBFAC,,, | Performed by: INTERNAL MEDICINE

## 2019-03-06 PROCEDURE — 99215 OFFICE O/P EST HI 40 MIN: CPT | Mod: S$PBB,,, | Performed by: INTERNAL MEDICINE

## 2019-03-06 PROCEDURE — 80053 COMPREHEN METABOLIC PANEL: CPT

## 2019-03-06 PROCEDURE — 99999 PR PBB SHADOW E&M-EST. PATIENT-LVL IV: ICD-10-PCS | Mod: PBBFAC,,, | Performed by: INTERNAL MEDICINE

## 2019-03-06 RX ORDER — PREDNISONE 10 MG/1
10 TABLET ORAL DAILY
Qty: 30 TABLET | Refills: 0 | Status: SHIPPED | OUTPATIENT
Start: 2019-03-06 | End: 2019-03-06 | Stop reason: SDUPTHER

## 2019-03-06 RX ORDER — PREDNISONE 10 MG/1
10 TABLET ORAL DAILY
Qty: 30 TABLET | Refills: 0 | Status: SHIPPED | OUTPATIENT
Start: 2019-03-06 | End: 2019-05-02

## 2019-03-06 NOTE — PROGRESS NOTES
Hematology and Medical Oncology   Follow Up Note     3/6/2019    Primary Oncologic Diagnosis: AML, FLT 3 + and NPMN1+.   History of Present Ilness:   Sabrina Badillo) is a pleasant 68 y.o.female presents to clinic following 2 induction therapies for AML. She was in the hospital roughly 50 days to receive 7+3 and then FLAG JENNIFER.    Oncology History:   68 y.o. female admitted overnight for possible new AML. Came in from OSH with elevated WBC of 100.   05/25/2018: Pt is now on hydrea 2 grams BID, allopurinol 300 mg daily, and IVF. Pt may need rasburicase this weekend. She will undergo a BM bx and aspiration today. She is an induction candidate and will not be screened for research trials. She has anxiety for which she usually takes xanax, this was re-started.   05/26/2018 PICC placed. Reports she slept well last night. Anxiety improved with prn xanax. EF 65%, HIV and Hep panel negative. 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  6/12/2018: Day 15 of 7+3 induction, restaging BM bx done yesterday. On Midostaurin. Fever yesterday and BC with gram + cocci in clusters. On cefepime day 2 and will start Vanc today. Labial mucositis improving.   6/13/2018: Day 16 7+3. BC with staph aureus, identification pending. Surveillance blood cultures done today. Afebrile x 48 hours. On cefepime and Vanc. Labial mucositis resolved. No complaints of pain. Nausea controlled. No diarrhea. Primary complaint is fatigue.   6/14/20018: Day 17 of 7+3. Day 14 bone marrow results pending. BC with staph epidermis only sensitive to Vancomycin. Vanc day 3 and cefepime day 4. Vanc trough 12.2 last night. BP remains low but stable. Afebrile x 72 hours.   6/15/2018: Day 18 of 7+3. Day 14 bone marrow biopsy shows persistent disease with 15% blasts. Discussion with patient regarding treatment and she is deciding if she wants to proceed with re-induction vs outpatient maintenance. Temp of 100.4 overnight, unsustained. Day 5 Cefepime and Day 4 of Vanc  for staph epidermis. Repeat cultures NGTD. BP improved. Electrolytes (mag, phos, K and calcium) replaced aggressively this am and will repeat labs this afternoon. No complaints this am.   06/16/2018: Day 19 of 7+3. Day 2 of Flag-JENNIFER. Remains afebrile. Calcium remains low and have increased PO supplementation. Remains on vanc and aztreonam. Somewhat loopy feeling after receiving benadryl.   06/17/2018: Day 20 of 7+3. Day 3 of FLAG-JENNIFER. Multiple electrolyte abnormalities. New bilateral leg and feet swelling.   6/18/2018: Day 21 of 7+3 and Day 4 of FLAG JENNIFER. Tolerating well with some nausea controlled with Zofran. VSS, afebrile. ANC 1900 on Neupogen. Continues Vanc for staph epi bacteremia. Multiple electrolytes replaced today. Complains of edema to lower extremities, not improved after 20 of lasix yesterday. No sob or CP.   6/19/18: Day 22 of 7+3 and Day 5 of FLAG JENNIFER. VSS, afebrile, ANC 3900. Vanc trough elevated and dose adjusted this am. Lower extremity edema improved after 40 of lasix yesterday, net negative 300 cc I&O. Mild nausea, no abdominal pain or diarrhea. Poor appetite but no difficulty eating or taking pills.   6/20/18: Day 23 of 7+3 and Day 6 of FLAG JENNIFER. Patient complains of nausea and vomiting overnight and this am, especially when taking pills. Will add Zyprexa daily in addition to Zofran, compazine, and ativan PRN and will change meds to IV. 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 Afebrile.  No diarrhea, mouth sores or pain.  06/21/2018: Day 24 of 7+3 and Day 7 of FLAG JENNIFER. Nausea better controlled. Continued on Vanc.  6/22/2018: Day 25 of 7+3 and Day 8 of FLAG JENNIFER. Nausea improved some with Zyprexa but did have 1 episode of emesis overnight.continuing meds IV due to vomiting. Remains afebrile. ANC 0. Continues Vanc and Flagyl. Electrolytes replaced.   06/23/2018 : day 26 of 7+3 and Day 9 of FLAG JENNIFER.  Nausea persists, worsened  after taking pills so meds converted to IV.  Encouraged ambulation.  Continue with flagyl for leptotrichia goodfellowii.  RUQ US showed intrahepatic dilation, overall impression hepatic steatosis.  CBD 0.5cm.  No Gallbladder.    06/24/2018 : day 27 of 7+3 and Day 10 of FLAG JENNIFER.  Nausea persisting, able to tolerate some po pills.  Last day of flagyl (day 5).  Still pancytopenic. Appetite still low as po intake triggers nausea.  06/25/2018: day 28 of 7+3 and Day 11 of FLAG JENNIFER. Afebrile, ANC 0. Has completed Flagyl. Will decrease Vanc to 1000 mg q12, trough 19.9, will complete Vanc on 6/27. Liver enzymes stable on Midostaurin. VSS.   6/26/2018: day 29 of 7+3 and day 12 of FLAG JENNIFER. Liver enzymes improved and Midostaurin increased to full dose 50 mg bid. Nausea controlled, afebrile, VSS, NEON.  6/27/2018: day 30 of 7+3 and Day 13 of FLAG JENNIFER. Persistent nausea and emesis x 1 yesterday. Compazine changed to scheduled. Vanc ends today. Afebrile, VSS. Aggressive electrolyte replacement, low electrolytes possibly due to Midostaurin.   6/28/2018: day 31 of 7+3 and Day 14 of FLAG JENNIFER. Nausea improved with scheduled Compazine. Patient has agreed to start converting some IV meds to po. VSS, afebrile, electrolytes wnl today. Only complains of fatigue, new rash noted to upper back and chest and legs, papular rash mildly red.  6/29/2018: Day 32 of 7+3 and Day 15 of FLAG JENNIFER. Day 14 restaging bone marrow biopsy done at bedside today. Patient states nausea improved but she did have emesis last night after taking 9 pm pills. Rash improved. No mouth sores, diarrhea or pain.   7/2/2018: Day 35 of 7+3 and Day 18 of FLAG JENNIFER. Restaging BM bx results pending. Fluid overload over the weekend. Chest x-ray with pulmonary edema. Os sats down to 88%. Placed on O2 at 2 L NC, off O2 this am. Received lasix. Net negative 2.7 L today. 1 episode of nausea, no emesis. Reports anxiety relieved with PRN meds. Electrolytes improved, afebrile, VSS.    7/3/2018: Day 36 of 7+3 and Day 19 of FLAG JENNIFER. Restaging Bm bx with GABRIEL. Cardiology consulted for abnormal echo, EF 30% with pulmonary HTN and severe L atrial enlargement. EKG with T wave abnormality, possible anterolateral ischemia and prolonged QTc of 530. Medications adjusted. Nausea controlled, no diarrhea. Electrolytes improved. On O2 as needed.   07/04/2018 : Day 37 of 7+3 and Day 20 of FLAG JENNIFER Diarrhea in AM, watery, no associated abdominal pain, nausea, or vomiting.    07/05/2018: Day 38 of 7+3 ane Day 21 of FLAG JENNIFER. No CP or SOB, cardiology following. On RA. All meds changed to po, denies nausea or vomiting and no diarrhea. VSS, afebrile.   7/6/2018: Day 22 of FLAG JENNIFER. Continues to tolerate po meds with no nausea. Afebrile. Awaiting count recovery for discharge.  7/7/2018-/8/2018: Day 40/41 of 7+3, Day 23/24 of FLAG JENNIFER.  Improving clinically.  Started on mag oxide per patient request.  Labs showing signs of ANC recovery.  7/9/2018: Day 25 FLAG JENNIFER, , continues Neupogen. Received platelets today. Afebrile, VSS. Tolerating all oral meds with no nausea or vomiting. Only complains of fatigue.   7/10/2018: Day 26 FLAG JENNIFER,  today. Had 2 episodes of vomiting and diarrhea last night. Feeling better. Afebrile, VSS.   7/11/2018: Day 27 FLAG JENNIFER,  today. No vomiting or diarrhea overnight and no nausea. Afebrile, VSS. Complains of back pain (bone pain) suspect due to count recovery. Relieved with oxy IR and Zyrtec started.  07/12/2018: Day 28 flag jennifer, engrafted today with ANC of 730. Back pain improved with zyrtec. Scheduled for IT chemo tomorrow at 1:30 pm and discharge home thereafter. Will likely need plt transfusion prior to IT chemo tomorrow   7/13/2018: Day 29 of FLAG JENNIFER. ANC up to 1300 today. Transfusing platelets today prior to LP for IT chemo. Patient continues to complain of bone pain, mostly to back and legs. No nausea, diarrhea, sob. Afebrile and VSS.   --hospitalized  7/23-7/25 for C.diff diarrhea, neutropenic fever.  While inpatient she developed pink blisters on her palm that were concerning for relapse.  --palm biopsy on 7/26/18 for suspicion of leukemia cutis was also negative for sign of relapse  --bone marrow biopsy 7/31/18 was negative for signs of relapse   --Admitted on 8/14/18 for HiDAC#1  --Admitted on 9/11/18 for HiDAC#2  --Admitted on 10/16/18 for HiDAC#3  --Post consolidation bone marrow biopsy on 12/27/2018: AML FISH (BM) Interpretation: The result is within normal limits for the AML FISH panel. No evidence of residual disease.    Interval History:   Ms. Wilkes looks excellent. She feels great, she is eating and returned to driving. Her only complaint is an on going rheum flair that had previously resolved with induction chemo, but in the last several months is increasingly an issue.  She has a follow up scheduled for April with her rheumatologist. Previously she had been on MTX and steroids for the issue.    Past Medical History:   Past Medical History:   Diagnosis Date    Cancer 03/2018    AML    CHF (congestive heart failure)     Diarrhea 9/14/2018    Encounter for blood transfusion        Current Medications:   Current Outpatient Medications   Medication Sig    acyclovir (ZOVIRAX) 200 MG capsule Take 2 capsules (400 mg total) by mouth 2 (two) times daily.    ciprofloxacin HCl (CIPRO) 500 MG tablet TAKE ONE TABLET BY MOUTH EVERY 12 HOURS    escitalopram oxalate (LEXAPRO) 10 MG tablet Take 10 mg by mouth once daily.    fluconazole (DIFLUCAN) 200 MG Tab Take 2 tablets (400 mg total) by mouth once daily.    LORazepam (ATIVAN) 0.5 MG tablet Take 1 tablet (0.5 mg total) by mouth every 6 (six) hours as needed (nausea).    magnesium oxide (MAG-OX) 400 mg tablet Take 2 tablets (800 mg total) by mouth 2 (two) times daily.    metoprolol succinate (TOPROL-XL) 25 MG 24 hr tablet Take 0.5 tablets (12.5 mg total) by mouth once daily.    midostaurin (RYDAPT) 25 mg  Cap Take 50 mg by mouth 2 (two) times daily on days 8 to 21 of cycle    nystatin (DUKE'S SOLUTION) Take 10 mLs by mouth 4 (four) times daily.    omeprazole (PRILOSEC OTC) 20 MG tablet Take 20 mg by mouth every morning.    ondansetron (ZOFRAN) 8 MG tablet Take 1 tablet (8 mg total) by mouth every 12 (twelve) hours as needed for Nausea.    oxyCODONE-acetaminophen (PERCOCET) 7.5-325 mg per tablet Take 1 tablet by mouth every 4 (four) hours as needed for Pain.    prochlorperazine (COMPAZINE) 10 MG tablet Take 1 tablet (10 mg total) by mouth 4 (four) times daily.    vitamin D 1000 units Tab Take 1 tablet (1,000 Units total) by mouth once daily.    walker (ULTRA-LIGHT ROLLATOR) Misc 1 Units by Misc.(Non-Drug; Combo Route) route once daily.    ALPRAZolam (XANAX) 0.25 MG tablet Take 1 tablet (0.25 mg total) by mouth 3 (three) times daily as needed for Anxiety.     No current facility-administered medications for this visit.      ALLERGIES:   Review of patient's allergies indicates:   Allergen Reactions    Methotrexate analogues      Elevated Liver Enzyme    Bactrim [sulfamethoxazole-trimethoprim] Nausea And Vomiting and Rash       Review of Systems:     Review of Systems   Constitutional: Positive for appetite change and fatigue. Negative for chills, diaphoresis, fever and unexpected weight change.   HENT:   Negative for hearing loss, mouth sores, nosebleeds, sore throat, trouble swallowing and voice change.    Eyes: Negative for eye problems and icterus.   Respiratory: Negative for chest tightness, cough, hemoptysis, shortness of breath and wheezing.    Cardiovascular: Negative for chest pain, leg swelling and palpitations.   Gastrointestinal: Negative for abdominal distention, abdominal pain, blood in stool, diarrhea, nausea and vomiting.   Endocrine: Negative for hot flashes.   Genitourinary: Negative for bladder incontinence, difficulty urinating, dysuria and hematuria.    Musculoskeletal: Positive for  myalgias. Negative for arthralgias, back pain, flank pain, gait problem, neck pain and neck stiffness.   Skin: Negative for itching, rash and wound.   Neurological: Negative for dizziness, extremity weakness, gait problem, headaches, numbness, seizures and speech difficulty.   Hematological: Negative for adenopathy. Does not bruise/bleed easily.   Psychiatric/Behavioral: Positive for sleep disturbance. Negative for confusion and depression. The patient is not nervous/anxious.         Physical Exam:     Vitals:    03/06/19 1337   BP: 135/67   Pulse: 82   Resp: 16   Temp: 98.6 °F (37 °C)       Physical Exam   Constitutional: She is oriented to person, place, and time. She appears well-developed and well-nourished. No distress.   HENT:   Head: Normocephalic and atraumatic.   Mouth/Throat: Oropharynx is clear and moist. No oropharyngeal exudate.   Hair has regrown     Eyes: Conjunctivae and EOM are normal. Pupils are equal, round, and reactive to light.   Neck: Normal range of motion. Neck supple. No JVD present. No tracheal deviation present. No thyromegaly present.   Cardiovascular: Normal rate, regular rhythm and normal heart sounds. Exam reveals no friction rub.   No murmur heard.  Pulmonary/Chest: Effort normal and breath sounds normal. No stridor. No respiratory distress. She has no wheezes. She has no rales. She exhibits no tenderness.   Abdominal: Soft. Bowel sounds are normal. She exhibits no distension. There is no tenderness. There is no rebound and no guarding.   Musculoskeletal: Normal range of motion. She exhibits no edema, tenderness or deformity.   Lymphadenopathy:     She has no axillary adenopathy.   Neurological: She is alert and oriented to person, place, and time. She displays normal reflexes. No cranial nerve deficit or sensory deficit. She exhibits normal muscle tone. Coordination normal.   Skin: Skin is warm and dry. Capillary refill takes less than 2 seconds. No rash noted. She is not  diaphoretic. No erythema. No pallor.   Psychiatric: She has a normal mood and affect. Her behavior is normal. Judgment and thought content normal.       ECOG Performance Status: (foot note - ECOG PS provided by Eastern Cooperative Oncology Group) 2 - Symptomatic, <50% confined to bed    Karnofsky Performance Score:  70%- Cares for Self: Unable to Carry on Normal Activity or Active Work    Labs:   Lab Results   Component Value Date    WBC 3.31 (L) 03/06/2019    HGB 10.5 (L) 03/06/2019    HCT 32.5 (L) 03/06/2019    PLT 88 (L) 03/06/2019    ALT 42 03/06/2019    AST 38 03/06/2019     03/06/2019    K 4.2 03/06/2019     03/06/2019    CREATININE 1.0 03/06/2019    BUN 16 03/06/2019    CO2 25 03/06/2019    INR 1.1 05/31/2018       Imaging: previous imaging has been reviewed    Assessment and Plan:     Mrs. Mac is a pleasant 68 year old female with recently diagnosed AML.      Acute Monocytic Leukemia  -- Admitted from [facility] on 5/25/18 early AM with WBC around 100s, for suspected new non-M3 AML  --lo res HLA typing on 5/26/18 and hi res on 5/27/18 done in anticipation of possible need for future transplant   --Pt with two daughters, two full sisters (in their mid 60s, one with brain aneurysm hx, other one without any medical issues), and one full brother (61 y/o healthy).  --NPM1 +, CEBPA (-), FLT3 (+).  On Midostaurin 50 mg PO BID until Day 14 (held starting 6/8 to 6/11). Stopped when FLAG JENNIFER re-induction started. Restarted Midostaurin at 25 mg bid on day 8 of FLAG JENNIFER induction (6/22), increase to full dose 50 mg bid (6/26) as improvement in liver enzymes. Stopped 7/3/18 due to abnormal cardiac echo.  --day 14 bone marrow biopsy completed 6/11 showing persistent disease with 15% CD 34 positive blasts  --Day 60 of FLAG-JENNIFER, tolerating without difficulty except nausea/vomiting  --day 14 restaging bone marrow biopsy done 6/29 without complication, results with no evidence of AML, FLT 3 negative, will  need repeat marrow at count recovery outpatient   --recovery marrow showed no evidence of disease  --HiDAC #1 on 8/14/18, HiDAC#2 on 9/11/18  --Cycle 3 delayed 1 week due to pancytopenia. Started on 10/16/18  --recovery bone marrow showed no signs of residual leukemia  --We will continue to follow labs [id number]    Thrombocytopenia  --Remains in the 70-90 range  --Since christmas there had been a steady slow decline in her platelets which was concerning for relapse. They have rebounded in the last 2 weeks and remain in the upper 80's    Suspected Leukemia Cutis  --pathology from dermatology confirmed inflammatory changes  --now resolved with topical steroid cream    Abnormal Liver Function Tests  --ALT and AST and Alk phos have again increased substantially since re-starting midostaurin.   --midostaurin started day 8 at 25 mg bid, monitoring liver enzymes closely and improved. Increasing Midostaurin to 50 mg bid 6/26. Stopped Midostaurin (7/3) due to new diagnosis of systolic heart failure EF 35%.  --6/22/18 liver US showing mild intrahepatic dilation, otherwise impression of hepatic steatosis.  Will hold off of MRCP at this time.    --Will permanently discontinue midostaurin     Chemotherapy induced cardiomyopathy  --cardiology follow-up as outpatient in 3 months  --repeat 2D echo on 6/15 with preserved EF of 60%.  However 7/2/18 echo with reduced EF 30-35%  --Echo on 8/15/18 was within normal range EF of 60-65%    Rheumatologic issues  --Previously on steroids and methotrexate  --Will refer to rheum at [facility] [place], [place]  --We do not have the outside records, but will attempt to obtain them    30 minutes were spent face to face with the patient and her  family to discuss the disease, natural history, treatment options and survival statistics. I have provided the patient with an opportunity to ask questions and have all questions answered to her satisfaction.       she will return to the local clinic in 2 weeks and to see us in 4  weeks, but knows to call in the interim if symptoms change or should a problem arise.        Laquita Troy MD  Hematology and Medical Oncology  Bone Marrow Transplant  [facility]

## 2019-03-13 ENCOUNTER — PATIENT MESSAGE (OUTPATIENT)
Dept: HEMATOLOGY/ONCOLOGY | Facility: CLINIC | Age: 69
End: 2019-03-13

## 2019-03-13 DIAGNOSIS — C92.00 ACUTE MYELOID LEUKEMIA NOT HAVING ACHIEVED REMISSION: ICD-10-CM

## 2019-03-13 RX ORDER — CIPROFLOXACIN 500 MG/1
500 TABLET ORAL EVERY 12 HOURS
Qty: 60 TABLET | Refills: 3 | Status: ON HOLD | OUTPATIENT
Start: 2019-03-13 | End: 2019-10-18 | Stop reason: HOSPADM

## 2019-03-26 ENCOUNTER — PATIENT MESSAGE (OUTPATIENT)
Dept: HEMATOLOGY/ONCOLOGY | Facility: CLINIC | Age: 69
End: 2019-03-26

## 2019-04-02 ENCOUNTER — PATIENT MESSAGE (OUTPATIENT)
Dept: RHEUMATOLOGY | Facility: CLINIC | Age: 69
End: 2019-04-02

## 2019-04-04 ENCOUNTER — OFFICE VISIT (OUTPATIENT)
Dept: HEMATOLOGY/ONCOLOGY | Facility: CLINIC | Age: 69
End: 2019-04-04
Payer: MEDICARE

## 2019-04-04 ENCOUNTER — PATIENT MESSAGE (OUTPATIENT)
Dept: RHEUMATOLOGY | Facility: CLINIC | Age: 69
End: 2019-04-04

## 2019-04-04 ENCOUNTER — HOSPITAL ENCOUNTER (OUTPATIENT)
Dept: RADIOLOGY | Facility: HOSPITAL | Age: 69
Discharge: HOME | End: 2019-04-04
Attending: INTERNAL MEDICINE
Payer: MEDICARE

## 2019-04-04 ENCOUNTER — INITIAL CONSULT (OUTPATIENT)
Dept: RHEUMATOLOGY | Facility: CLINIC | Age: 69
End: 2019-04-04
Payer: MEDICARE

## 2019-04-04 ENCOUNTER — TELEPHONE (OUTPATIENT)
Dept: HEMATOLOGY/ONCOLOGY | Facility: CLINIC | Age: 69
End: 2019-04-04

## 2019-04-04 VITALS
HEIGHT: 62 IN | SYSTOLIC BLOOD PRESSURE: 115 MMHG | WEIGHT: 149.06 LBS | DIASTOLIC BLOOD PRESSURE: 70 MMHG | HEART RATE: 87 BPM | BODY MASS INDEX: 27.43 KG/M2

## 2019-04-04 VITALS
TEMPERATURE: 98 F | OXYGEN SATURATION: 100 % | HEART RATE: 95 BPM | RESPIRATION RATE: 16 BRPM | DIASTOLIC BLOOD PRESSURE: 61 MMHG | SYSTOLIC BLOOD PRESSURE: 132 MMHG | BODY MASS INDEX: 27.59 KG/M2 | HEIGHT: 62 IN | WEIGHT: 149.94 LBS

## 2019-04-04 DIAGNOSIS — M79.605 PAIN IN BOTH LOWER EXTREMITIES: ICD-10-CM

## 2019-04-04 DIAGNOSIS — M25.512 LEFT SHOULDER PAIN, UNSPECIFIED CHRONICITY: ICD-10-CM

## 2019-04-04 DIAGNOSIS — C92.00 ACUTE MYELOID LEUKEMIA NOT HAVING ACHIEVED REMISSION: Primary | ICD-10-CM

## 2019-04-04 DIAGNOSIS — C93.00 ACUTE MONOCYTIC LEUKEMIA NOT HAVING ACHIEVED REMISSION: ICD-10-CM

## 2019-04-04 DIAGNOSIS — M79.604 PAIN IN BOTH LOWER EXTREMITIES: ICD-10-CM

## 2019-04-04 DIAGNOSIS — M25.50 POLYARTHRALGIA: Primary | ICD-10-CM

## 2019-04-04 DIAGNOSIS — M25.50 POLYARTHRALGIA: ICD-10-CM

## 2019-04-04 DIAGNOSIS — K12.31 MUCOSITIS DUE TO CHEMOTHERAPY: ICD-10-CM

## 2019-04-04 DIAGNOSIS — G89.29 OTHER CHRONIC PAIN: ICD-10-CM

## 2019-04-04 PROCEDURE — 99999 PR PBB SHADOW E&M-EST. PATIENT-LVL IV: ICD-10-PCS | Mod: PBBFAC,,, | Performed by: INTERNAL MEDICINE

## 2019-04-04 PROCEDURE — 73030 X-RAY EXAM OF SHOULDER: CPT | Mod: 26,50,, | Performed by: RADIOLOGY

## 2019-04-04 PROCEDURE — 99205 PR OFFICE/OUTPT VISIT, NEW, LEVL V, 60-74 MIN: ICD-10-PCS | Mod: S$PBB,,, | Performed by: INTERNAL MEDICINE

## 2019-04-04 PROCEDURE — 77077 XR ARTHRITIS SURVEY: ICD-10-PCS | Mod: 26,,, | Performed by: RADIOLOGY

## 2019-04-04 PROCEDURE — 73030 X-RAY EXAM OF SHOULDER: CPT | Mod: TC,50

## 2019-04-04 PROCEDURE — 99214 OFFICE O/P EST MOD 30 MIN: CPT | Mod: PBBFAC,25 | Performed by: INTERNAL MEDICINE

## 2019-04-04 PROCEDURE — 77077 JOINT SURVEY SINGLE VIEW: CPT | Mod: TC

## 2019-04-04 PROCEDURE — 99999 PR PBB SHADOW E&M-EST. PATIENT-LVL IV: CPT | Mod: PBBFAC,,, | Performed by: INTERNAL MEDICINE

## 2019-04-04 PROCEDURE — 77077 JOINT SURVEY SINGLE VIEW: CPT | Mod: 26,,, | Performed by: RADIOLOGY

## 2019-04-04 PROCEDURE — 99205 OFFICE O/P NEW HI 60 MIN: CPT | Mod: S$PBB,,, | Performed by: INTERNAL MEDICINE

## 2019-04-04 PROCEDURE — 99214 OFFICE O/P EST MOD 30 MIN: CPT | Mod: PBBFAC,25,27 | Performed by: INTERNAL MEDICINE

## 2019-04-04 PROCEDURE — 99215 PR OFFICE/OUTPT VISIT, EST, LEVL V, 40-54 MIN: ICD-10-PCS | Mod: S$PBB,,, | Performed by: INTERNAL MEDICINE

## 2019-04-04 PROCEDURE — 73030 XR SHOULDER COMPLETE 2 OR MORE VIEWS BILATERAL: ICD-10-PCS | Mod: 26,50,, | Performed by: RADIOLOGY

## 2019-04-04 PROCEDURE — 99215 OFFICE O/P EST HI 40 MIN: CPT | Mod: S$PBB,,, | Performed by: INTERNAL MEDICINE

## 2019-04-04 RX ORDER — ALPRAZOLAM 0.25 MG/1
0.25 TABLET ORAL 3 TIMES DAILY PRN
Qty: 90 TABLET | Refills: 1 | Status: SHIPPED | OUTPATIENT
Start: 2019-04-04 | End: 2019-05-09

## 2019-04-04 RX ORDER — OXYCODONE AND ACETAMINOPHEN 7.5; 325 MG/1; MG/1
1 TABLET ORAL EVERY 4 HOURS PRN
Qty: 90 TABLET | Refills: 0 | Status: SHIPPED | OUTPATIENT
Start: 2019-04-04 | End: 2019-07-26 | Stop reason: SDUPTHER

## 2019-04-04 ASSESSMENT — ROUTINE ASSESSMENT OF PATIENT INDEX DATA (RAPID3)
FATIGUE SCORE: 3
TOTAL RAPID3 SCORE: 4.67
PSYCHOLOGICAL DISTRESS SCORE: 7
MDHAQ FUNCTION SCORE: .9
PATIENT GLOBAL ASSESSMENT SCORE: 6
PAIN SCORE: 5

## 2019-04-04 NOTE — LETTER
April 5, 2019      Laquita Troy MD  [address]  [facility] [address]           [facility] - Rheumatology  [address]  [facility] [address]  Phone: [phone]  Fax: [phone]          Patient: Sabrina Mac   MR Number: [id number]   YOB: 1950   Date of Visit: 4/4/2019       Dear Dr. Laquita Troy:    Thank you for referring Sabrina Mac to me for evaluation. Attached you will find relevant portions of my assessment and plan of care.    If you have questions, please do not hesitate to call me. I look forward to following Sabrina Mac along with you.    Sincerely,    Leslye Barger MD    Enclosure  CC:  No Recipients    If you would like to receive this communication electronically, please contact [email or web address] or [phone] to request more information on [facility] Link access.    For providers and/or their staff who would like to refer a patient to [facility], please contact us through our one-stop-shop provider referral line, [facility], at [phone].    If you feel you have received this communication in error or would no longer like to receive these types of communications, please e-mail [email or web address]

## 2019-04-04 NOTE — PROGRESS NOTES
Subjective:       Patient ID: Sabrina Mac is a 68 y.o. female.    Chief Complaint: Disease Management    HPI   68 year old F with PMH of  AML s/p chemo in 2018, CHF, thrombocytopenia,undifferentiated connective tissue disease, osteoporosis here for evaluation.  IN September 2015, she was diagnosed with undifferentiated connective tissue disease. When she was diagnosed with connective tissue disease, she was having pain all over body.  She was having trouble getting dressed and getting up from chair. She reports she had +CORTES and +RF.  Denies ever having swelling. She was put on MTX and developed significant transaminitis and nausea. When she was on chemo, she did not have any joint pain. She finished chemotherapy in October, then her joints started getting worse. She was put on prednisone 10mg a day since march. She has pain in left shoulder and both knees. Pain level can be as high as 9/10,aching, non radiating.  Denies hip pain, headaches, or jaw claudication. Denies weakness.     Past Medical History:   Diagnosis Date    Cancer 03/2018    AML    CHF (congestive heart failure)     Diarrhea 9/14/2018    Encounter for blood transfusion        Review of Systems   Constitutional: Negative for activity change, appetite change, chills, diaphoresis, fatigue, fever and unexpected weight change.   HENT: Negative for congestion, ear discharge, ear pain, facial swelling, mouth sores, sinus pressure, sneezing, sore throat, tinnitus and trouble swallowing.    Eyes: Negative for photophobia, pain, discharge, redness, itching and visual disturbance.   Respiratory: Negative for apnea, chest tightness, shortness of breath, wheezing and stridor.    Cardiovascular: Negative for leg swelling.   Gastrointestinal: Negative for abdominal distention, abdominal pain, anal bleeding, blood in stool, constipation, diarrhea and nausea.   Endocrine: Negative for cold intolerance and heat intolerance.   Genitourinary: Negative for  "difficulty urinating and dysuria.   Musculoskeletal: Positive for arthralgias and joint swelling. Negative for back pain, gait problem, myalgias, neck pain and neck stiffness.   Skin: Negative for color change, pallor, rash and wound.   Neurological: Negative for dizziness, seizures, light-headedness and numbness.   Hematological: Negative for adenopathy. Does not bruise/bleed easily.   Psychiatric/Behavioral: Negative for sleep disturbance. The patient is not nervous/anxious.            Objective:   /70   Pulse 87   Ht 5' 2" (1.575 m)   Wt 67.6 kg (149 lb 0.5 oz)   LMP  (LMP Unknown)   BMI 27.26 kg/m²      Physical Exam   Constitutional: She is oriented to person, place, and time.   HENT:   Head: Normocephalic and atraumatic.   Right Ear: External ear normal.   Left Ear: External ear normal.   Nose: Nose normal.   Mouth/Throat: Oropharynx is clear and moist. No oropharyngeal exudate.   Eyes: Conjunctivae and EOM are normal. Pupils are equal, round, and reactive to light. Right eye exhibits no discharge. Left eye exhibits no discharge. No scleral icterus.   Neck: Neck supple. No JVD present. No thyromegaly present.   Cardiovascular: Normal rate, regular rhythm, normal heart sounds and intact distal pulses.  Exam reveals no gallop and no friction rub.    No murmur heard.  Pulmonary/Chest: Effort normal and breath sounds normal. No respiratory distress. She has no wheezes. She has no rales. She exhibits no tenderness.   Abdominal: Soft. Bowel sounds are normal. She exhibits no distension and no mass. There is no tenderness. There is no rebound and no guarding.   Lymphadenopathy:     She has no cervical adenopathy.   Neurological: She is alert and oriented to person, place, and time. No cranial nerve deficit. Gait normal. Coordination normal.   Skin: Skin is dry. No rash noted. No erythema. No pallor.     Psychiatric: Affect and judgment normal.   Musculoskeletal: She exhibits no edema, tenderness or " deformity.   Trouble rising from chair  Trouble making a fist with both hands       labs: reviewed     Assessment:     78 year old F with PMH of  AML s/p chemo in 2018, CHF, thrombocytopenia,undifferentiated connective tissue disease, osteoporosis here for evaluation.    She reports being diagnosed with undifferentiated connective tissue disease when she was presented with arthralgias. Reports she felt the best while on chemo when it comes to her joints.  I suspect she has an inflammatory arthritis. Will work her up for inflammatory arthritis and bring her back to discuss treatment options.    No diagnosis found.        Plan:       NO MTX given reaction including transaminitis *    Request outside records from neurology EMG and rheumatologist outside clinic note  rtc to review results

## 2019-04-04 NOTE — TELEPHONE ENCOUNTER
----- Message from Dilcia Velazquez sent at 4/4/2019 11:06 AM CDT -----  Contact: Will ( Dr Davis Restrepo)  Calling for an update on medical clearance that was sent to the office.      Contact:: [phone]    Spoke with Dr Restrepo office. Patient will have extended dental work. Will will fax form.

## 2019-04-04 NOTE — PROGRESS NOTES
Hematology and Medical Oncology   Follow Up Note     4/4/2019    Primary Oncologic Diagnosis: AML, FLT 3 + and NPMN1+.   History of Present Ilness:   Sabrina Badillo) is a pleasant 68 y.o.female presents to clinic following 2 induction therapies for AML. She was in the hospital roughly 50 days to receive 7+3 and then FLAG JENNIFER.    Oncology History:   68 y.o. female admitted overnight for possible new AML. Came in from OSH with elevated WBC of 100.   05/25/2018: Pt is now on hydrea 2 grams BID, allopurinol 300 mg daily, and IVF. Pt may need rasburicase this weekend. She will undergo a BM bx and aspiration today. She is an induction candidate and will not be screened for research trials. She has anxiety for which she usually takes xanax, this was re-started.   05/26/2018 PICC placed. Reports she slept well last night. Anxiety improved with prn xanax. EF 65%, HIV and Hep panel negative. 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  6/12/2018: Day 15 of 7+3 induction, restaging BM bx done yesterday. On Midostaurin. Fever yesterday and BC with gram + cocci in clusters. On cefepime day 2 and will start Vanc today. Labial mucositis improving.   6/13/2018: Day 16 7+3. BC with staph aureus, identification pending. Surveillance blood cultures done today. Afebrile x 48 hours. On cefepime and Vanc. Labial mucositis resolved. No complaints of pain. Nausea controlled. No diarrhea. Primary complaint is fatigue.   6/14/20018: Day 17 of 7+3. Day 14 bone marrow results pending. BC with staph epidermis only sensitive to Vancomycin. Vanc day 3 and cefepime day 4. Vanc trough 12.2 last night. BP remains low but stable. Afebrile x 72 hours.   6/15/2018: Day 18 of 7+3. Day 14 bone marrow biopsy shows persistent disease with 15% blasts. Discussion with patient regarding treatment and she is deciding if she wants to proceed with re-induction vs outpatient maintenance. Temp of 100.4 overnight, unsustained. Day 5 Cefepime and Day 4 of Vanc  for staph epidermis. Repeat cultures NGTD. BP improved. Electrolytes (mag, phos, K and calcium) replaced aggressively this am and will repeat labs this afternoon. No complaints this am.   06/16/2018: Day 19 of 7+3. Day 2 of Flag-JENNIFER. Remains afebrile. Calcium remains low and have increased PO supplementation. Remains on vanc and aztreonam. Somewhat loopy feeling after receiving benadryl.   06/17/2018: Day 20 of 7+3. Day 3 of FLAG-JENNIFER. Multiple electrolyte abnormalities. New bilateral leg and feet swelling.   6/18/2018: Day 21 of 7+3 and Day 4 of FLAG JENNIFER. Tolerating well with some nausea controlled with Zofran. VSS, afebrile. ANC 1900 on Neupogen. Continues Vanc for staph epi bacteremia. Multiple electrolytes replaced today. Complains of edema to lower extremities, not improved after 20 of lasix yesterday. No sob or CP.   6/19/18: Day 22 of 7+3 and Day 5 of FLAG JENNIFER. VSS, afebrile, ANC 3900. Vanc trough elevated and dose adjusted this am. Lower extremity edema improved after 40 of lasix yesterday, net negative 300 cc I&O. Mild nausea, no abdominal pain or diarrhea. Poor appetite but no difficulty eating or taking pills.   6/20/18: Day 23 of 7+3 and Day 6 of FLAG JENNIFER. Patient complains of nausea and vomiting overnight and this am, especially when taking pills. Will add Zyprexa daily in addition to Zofran, compazine, and ativan PRN and will change meds to IV. 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 Afebrile.  No diarrhea, mouth sores or pain.  06/21/2018: Day 24 of 7+3 and Day 7 of FLAG JENNIFER. Nausea better controlled. Continued on Vanc.  6/22/2018: Day 25 of 7+3 and Day 8 of FLAG JENNIFER. Nausea improved some with Zyprexa but did have 1 episode of emesis overnight.continuing meds IV due to vomiting. Remains afebrile. ANC 0. Continues Vanc and Flagyl. Electrolytes replaced.   06/23/2018 : day 26 of 7+3 and Day 9 of FLAG JENNIFER.  Nausea persists, worsened  after taking pills so meds converted to IV.  Encouraged ambulation.  Continue with flagyl for leptotrichia goodfellowii.  RUQ US showed intrahepatic dilation, overall impression hepatic steatosis.  CBD 0.5cm.  No Gallbladder.    06/24/2018 : day 27 of 7+3 and Day 10 of FLAG JENNIFER.  Nausea persisting, able to tolerate some po pills.  Last day of flagyl (day 5).  Still pancytopenic. Appetite still low as po intake triggers nausea.  06/25/2018: day 28 of 7+3 and Day 11 of FLAG JENNIFER. Afebrile, ANC 0. Has completed Flagyl. Will decrease Vanc to 1000 mg q12, trough 19.9, will complete Vanc on 6/27. Liver enzymes stable on Midostaurin. VSS.   6/26/2018: day 29 of 7+3 and day 12 of FLAG JENNIFER. Liver enzymes improved and Midostaurin increased to full dose 50 mg bid. Nausea controlled, afebrile, VSS, NEON.  6/27/2018: day 30 of 7+3 and Day 13 of FLAG JENNIFER. Persistent nausea and emesis x 1 yesterday. Compazine changed to scheduled. Vanc ends today. Afebrile, VSS. Aggressive electrolyte replacement, low electrolytes possibly due to Midostaurin.   6/28/2018: day 31 of 7+3 and Day 14 of FLAG JENNIFER. Nausea improved with scheduled Compazine. Patient has agreed to start converting some IV meds to po. VSS, afebrile, electrolytes wnl today. Only complains of fatigue, new rash noted to upper back and chest and legs, papular rash mildly red.  6/29/2018: Day 32 of 7+3 and Day 15 of FLAG JENNIFER. Day 14 restaging bone marrow biopsy done at bedside today. Patient states nausea improved but she did have emesis last night after taking 9 pm pills. Rash improved. No mouth sores, diarrhea or pain.   7/2/2018: Day 35 of 7+3 and Day 18 of FLAG JENNIFER. Restaging BM bx results pending. Fluid overload over the weekend. Chest x-ray with pulmonary edema. Os sats down to 88%. Placed on O2 at 2 L NC, off O2 this am. Received lasix. Net negative 2.7 L today. 1 episode of nausea, no emesis. Reports anxiety relieved with PRN meds. Electrolytes improved, afebrile, VSS.    7/3/2018: Day 36 of 7+3 and Day 19 of FLAG JENNIFER. Restaging Bm bx with GABRIEL. Cardiology consulted for abnormal echo, EF 30% with pulmonary HTN and severe L atrial enlargement. EKG with T wave abnormality, possible anterolateral ischemia and prolonged QTc of 530. Medications adjusted. Nausea controlled, no diarrhea. Electrolytes improved. On O2 as needed.   07/04/2018 : Day 37 of 7+3 and Day 20 of FLAG JENNIFER Diarrhea in AM, watery, no associated abdominal pain, nausea, or vomiting.    07/05/2018: Day 38 of 7+3 ane Day 21 of FLAG JENNIFER. No CP or SOB, cardiology following. On RA. All meds changed to po, denies nausea or vomiting and no diarrhea. VSS, afebrile.   7/6/2018: Day 22 of FLAG JENNIFER. Continues to tolerate po meds with no nausea. Afebrile. Awaiting count recovery for discharge.  7/7/2018-/8/2018: Day 40/41 of 7+3, Day 23/24 of FLAG JENNIFER.  Improving clinically.  Started on mag oxide per patient request.  Labs showing signs of ANC recovery.  7/9/2018: Day 25 FLAG JENNIFER, , continues Neupogen. Received platelets today. Afebrile, VSS. Tolerating all oral meds with no nausea or vomiting. Only complains of fatigue.   7/10/2018: Day 26 FLAG JENNIFER,  today. Had 2 episodes of vomiting and diarrhea last night. Feeling better. Afebrile, VSS.   7/11/2018: Day 27 FLAG JENNIFER,  today. No vomiting or diarrhea overnight and no nausea. Afebrile, VSS. Complains of back pain (bone pain) suspect due to count recovery. Relieved with oxy IR and Zyrtec started.  07/12/2018: Day 28 flag jennifer, engrafted today with ANC of 730. Back pain improved with zyrtec. Scheduled for IT chemo tomorrow at 1:30 pm and discharge home thereafter. Will likely need plt transfusion prior to IT chemo tomorrow   7/13/2018: Day 29 of FLAG JENNIFER. ANC up to 1300 today. Transfusing platelets today prior to LP for IT chemo. Patient continues to complain of bone pain, mostly to back and legs. No nausea, diarrhea, sob. Afebrile and VSS.   --hospitalized  7/23-7/25 for C.diff diarrhea, neutropenic fever.  While inpatient she developed pink blisters on her palm that were concerning for relapse.  --palm biopsy on 7/26/18 for suspicion of leukemia cutis was also negative for sign of relapse  --bone marrow biopsy 7/31/18 was negative for signs of relapse   --Admitted on 8/14/18 for HiDAC#1  --Admitted on 9/11/18 for HiDAC#2  --Admitted on 10/16/18 for HiDAC#3  --Post consolidation bone marrow biopsy on 12/27/2018: AML FISH (BM) Interpretation: The result is within normal limits for the AML FISH panel. No evidence of residual disease.    Interval History:   Ms. Wilkes looks excellent. She is now working with rheumatology to control her rheumatoid arthritis flair. She is completing a full xray series of her joints after this visit. She remains active. Her appetite is still very good.    Past Medical History:   Past Medical History:   Diagnosis Date    Cancer 03/2018    AML    CHF (congestive heart failure)     Diarrhea 9/14/2018    Encounter for blood transfusion        Current Medications:   Current Outpatient Medications   Medication Sig    acyclovir (ZOVIRAX) 200 MG capsule Take 2 capsules (400 mg total) by mouth 2 (two) times daily.    ALPRAZolam (XANAX) 0.25 MG tablet Take 1 tablet (0.25 mg total) by mouth 3 (three) times daily as needed for Anxiety.    ciprofloxacin HCl (CIPRO) 500 MG tablet Take 1 tablet (500 mg total) by mouth every 12 (twelve) hours.    escitalopram oxalate (LEXAPRO) 10 MG tablet Take 10 mg by mouth once daily.    fluconazole (DIFLUCAN) 200 MG Tab Take 2 tablets (400 mg total) by mouth once daily.    LORazepam (ATIVAN) 0.5 MG tablet Take 1 tablet (0.5 mg total) by mouth every 6 (six) hours as needed (nausea).    magnesium oxide (MAG-OX) 400 mg tablet Take 2 tablets (800 mg total) by mouth 2 (two) times daily.    metoprolol succinate (TOPROL-XL) 25 MG 24 hr tablet Take 0.5 tablets (12.5 mg total) by mouth once daily.    midostaurin  (RYDAPT) 25 mg Cap Take 50 mg by mouth 2 (two) times daily on days 8 to 21 of cycle    nystatin (DUKE'S SOLUTION) Take 10 mLs by mouth 4 (four) times daily.    omeprazole (PRILOSEC OTC) 20 MG tablet Take 20 mg by mouth every morning.    ondansetron (ZOFRAN) 8 MG tablet Take 1 tablet (8 mg total) by mouth every 12 (twelve) hours as needed for Nausea.    oxyCODONE-acetaminophen (PERCOCET) 7.5-325 mg per tablet Take 1 tablet by mouth every 4 (four) hours as needed for Pain.    predniSONE (DELTASONE) 10 MG tablet Take 1 tablet (10 mg total) by mouth once daily. Prn arthritis flare    prochlorperazine (COMPAZINE) 10 MG tablet Take 1 tablet (10 mg total) by mouth 4 (four) times daily.    vitamin D 1000 units Tab Take 1 tablet (1,000 Units total) by mouth once daily.    walker (ULTRA-LIGHT ROLLATOR) Misc 1 Units by Misc.(Non-Drug; Combo Route) route once daily.     No current facility-administered medications for this visit.      ALLERGIES:   Review of patient's allergies indicates:   Allergen Reactions    Methotrexate analogues      Elevated Liver Enzyme    Bactrim [sulfamethoxazole-trimethoprim] Nausea And Vomiting and Rash       Review of Systems:     Review of Systems   Constitutional: Positive for fatigue. Negative for appetite change, chills, diaphoresis, fever and unexpected weight change.   HENT:   Negative for hearing loss, mouth sores, nosebleeds, sore throat, trouble swallowing and voice change.    Eyes: Negative for eye problems and icterus.   Respiratory: Negative for chest tightness, cough, hemoptysis, shortness of breath and wheezing.    Cardiovascular: Negative for chest pain, leg swelling and palpitations.   Gastrointestinal: Negative for abdominal distention, abdominal pain, blood in stool, diarrhea, nausea and vomiting.   Endocrine: Negative for hot flashes.   Genitourinary: Negative for bladder incontinence, difficulty urinating, dysuria and hematuria.    Musculoskeletal: Positive for  myalgias. Negative for arthralgias, back pain, flank pain, gait problem, neck pain and neck stiffness.   Skin: Negative for itching, rash and wound.   Neurological: Negative for dizziness, extremity weakness, gait problem, headaches, numbness, seizures and speech difficulty.   Hematological: Negative for adenopathy. Does not bruise/bleed easily.   Psychiatric/Behavioral: Positive for sleep disturbance. Negative for confusion and depression. The patient is not nervous/anxious.         Physical Exam:     Vitals:    04/04/19 1331   BP: 132/61   Pulse: 95   Resp: 16   Temp: 98.4 °F (36.9 °C)       Physical Exam   Constitutional: She is oriented to person, place, and time. She appears well-developed and well-nourished. No distress.   HENT:   Head: Normocephalic and atraumatic.   Mouth/Throat: Oropharynx is clear and moist. No oropharyngeal exudate.   Hair has regrown     Eyes: Pupils are equal, round, and reactive to light. Conjunctivae and EOM are normal.   Neck: Normal range of motion. Neck supple. No JVD present. No tracheal deviation present. No thyromegaly present.   Cardiovascular: Normal rate, regular rhythm and normal heart sounds. Exam reveals no friction rub.   No murmur heard.  Pulmonary/Chest: Effort normal and breath sounds normal. No stridor. No respiratory distress. She has no wheezes. She has no rales. She exhibits no tenderness.   Abdominal: Soft. Bowel sounds are normal. She exhibits no distension. There is no tenderness. There is no rebound and no guarding.   Musculoskeletal: Normal range of motion. She exhibits no edema, tenderness or deformity.   Lymphadenopathy:     She has no axillary adenopathy.   Neurological: She is alert and oriented to person, place, and time. She displays normal reflexes. No cranial nerve deficit or sensory deficit. She exhibits normal muscle tone. Coordination normal.   Skin: Skin is warm and dry. Capillary refill takes less than 2 seconds. No rash noted. She is not  diaphoretic. No erythema. No pallor.   Psychiatric: She has a normal mood and affect. Her behavior is normal. Judgment and thought content normal.       ECOG Performance Status: (foot note - ECOG PS provided by Eastern Cooperative Oncology Group) 1 - Symptomatic but completely ambulatory    Karnofsky Performance Score:  80%- Normal Activity with Effort: Some Symptoms of Disease    Labs:   Lab Results   Component Value Date    WBC 5.35 04/04/2019    HGB 10.7 (L) 04/04/2019    HCT 33.4 (L) 04/04/2019    PLT 80 (L) 04/04/2019    ALT 13 04/04/2019    AST 18 04/04/2019     04/04/2019    K 4.0 04/04/2019     04/04/2019    CREATININE 1.1 04/04/2019    BUN 21 04/04/2019    CO2 25 04/04/2019    INR 1.1 05/31/2018       Imaging: previous imaging has been reviewed    Assessment and Plan:     Mrs. Mac is a pleasant 68 year old female with recently diagnosed AML.      Acute Monocytic Leukemia  -- Admitted from [facility] on 5/25/18 early AM with WBC around 100s, for suspected new non-M3 AML  --lo res HLA typing on 5/26/18 and hi res on 5/27/18 done in anticipation of possible need for future transplant   --Pt with two daughters, two full sisters (in their mid 60s, one with brain aneurysm hx, other one without any medical issues), and one full brother (61 y/o healthy).  --NPM1 +, CEBPA (-), FLT3 (+).  On Midostaurin 50 mg PO BID until Day 14 (held starting 6/8 to 6/11). Stopped when FLAG JENNIFER re-induction started. Restarted Midostaurin at 25 mg bid on day 8 of FLAG JENNIFER induction (6/22), increase to full dose 50 mg bid (6/26) as improvement in liver enzymes. Stopped 7/3/18 due to abnormal cardiac echo.  --day 14 bone marrow biopsy completed 6/11 showing persistent disease with 15% CD 34 positive blasts  --Day 60 of FLAG-JENNIFER, tolerating without difficulty except nausea/vomiting  --day 14 restaging bone marrow biopsy done 6/29 without complication, results with no evidence of AML, FLT 3 negative, will need repeat  marrow at count recovery outpatient   --recovery marrow showed no evidence of disease  --HiDAC #1 on 8/14/18, HiDAC#2 on 9/11/18  --Cycle 3 delayed 1 week due to pancytopenia. Started on 10/16/18  --recovery bone marrow showed no signs of residual leukemia  --We will continue to follow labs [id number]    Thrombocytopenia  --Remains in the 70-90 range  --Since christmas there had been a steady slow decline in her platelets which was concerning for relapse.   --The last 2 months the count has been consistent in the 80's    Suspected Leukemia Cutis  --pathology from dermatology confirmed inflammatory changes  --now resolved with topical steroid cream    Abnormal Liver Function Tests  --ALT and AST and Alk phos have again increased substantially since re-starting midostaurin.   --midostaurin started day 8 at 25 mg bid, monitoring liver enzymes closely and improved. Increasing Midostaurin to 50 mg bid 6/26. Stopped Midostaurin (7/3) due to new diagnosis of systolic heart failure EF 35%.  --6/22/18 liver US showing mild intrahepatic dilation, otherwise impression of hepatic steatosis.  Will hold off of MRCP at this time.    --permanently discontinued midostaurin     Chemotherapy induced cardiomyopathy  --cardiology follow-up as outpatient in 3 months  --repeat 2D echo on 6/15 with preserved EF of 60%.  However 7/2/18 echo with reduced EF 30-35%  --Echo on 8/15/18 was within normal range EF of 60-65%    Rheumatologic issues  --Previously on steroids and methotrexate  --working with [facility] rheumatology  -  30 minutes were spent face to face with the patient and her  family to discuss the disease, natural history, treatment options and survival statistics. I have provided the patient with an opportunity to ask questions and have all questions answered to her satisfaction.       she will return to the local clinic in 2 weeks and to see us in 4 weeks, but knows to call in the interim if symptoms change or should a problem  arise.        Laquita Troy MD  Hematology and Medical Oncology  Bone Marrow Transplant  [facility]

## 2019-04-05 ENCOUNTER — TELEPHONE (OUTPATIENT)
Dept: RHEUMATOLOGY | Facility: CLINIC | Age: 69
End: 2019-04-05

## 2019-04-09 ENCOUNTER — PATIENT MESSAGE (OUTPATIENT)
Dept: RHEUMATOLOGY | Facility: CLINIC | Age: 69
End: 2019-04-09

## 2019-04-09 ENCOUNTER — TELEPHONE (OUTPATIENT)
Dept: HEMATOLOGY/ONCOLOGY | Facility: CLINIC | Age: 69
End: 2019-04-09

## 2019-04-09 DIAGNOSIS — M19.90 INFLAMMATORY ARTHRITIS: Primary | ICD-10-CM

## 2019-04-26 NOTE — ASSESSMENT & PLAN NOTE
- continue home Escitalopram   - no acute issue   - with associated anxiety--takes xanax at home but unaware of dose. Started on xanax 0.25 mg bid prn, can increase if needed   left side pain since yesterday, moved quickly out of the way from a falling shelf. C/o left side and back pain

## 2019-04-30 ENCOUNTER — PATIENT MESSAGE (OUTPATIENT)
Dept: RHEUMATOLOGY | Facility: CLINIC | Age: 69
End: 2019-04-30

## 2019-05-02 ENCOUNTER — LAB VISIT (OUTPATIENT)
Dept: LAB | Facility: HOSPITAL | Age: 69
End: 2019-05-02
Attending: INTERNAL MEDICINE
Payer: MEDICARE

## 2019-05-02 ENCOUNTER — OFFICE VISIT (OUTPATIENT)
Dept: HEMATOLOGY/ONCOLOGY | Facility: CLINIC | Age: 69
End: 2019-05-02
Payer: MEDICARE

## 2019-05-02 ENCOUNTER — TELEPHONE (OUTPATIENT)
Dept: HEMATOLOGY/ONCOLOGY | Facility: CLINIC | Age: 69
End: 2019-05-02

## 2019-05-02 VITALS
WEIGHT: 153 LBS | RESPIRATION RATE: 18 BRPM | HEIGHT: 62 IN | SYSTOLIC BLOOD PRESSURE: 118 MMHG | TEMPERATURE: 98 F | OXYGEN SATURATION: 99 % | BODY MASS INDEX: 28.16 KG/M2 | HEART RATE: 81 BPM | DIASTOLIC BLOOD PRESSURE: 61 MMHG

## 2019-05-02 DIAGNOSIS — G89.29 OTHER CHRONIC PAIN: ICD-10-CM

## 2019-05-02 DIAGNOSIS — R79.89 ELEVATED LFTS: ICD-10-CM

## 2019-05-02 DIAGNOSIS — C92.00 AML (ACUTE MYELOBLASTIC LEUKEMIA): ICD-10-CM

## 2019-05-02 DIAGNOSIS — C93.00 ACUTE MONOCYTIC LEUKEMIA NOT HAVING ACHIEVED REMISSION: Primary | ICD-10-CM

## 2019-05-02 DIAGNOSIS — M19.90 INFLAMMATORY ARTHRITIS: ICD-10-CM

## 2019-05-02 DIAGNOSIS — C92.00 ACUTE MYELOID LEUKEMIA NOT HAVING ACHIEVED REMISSION: Primary | ICD-10-CM

## 2019-05-02 LAB
ALBUMIN SERPL BCP-MCNC: 3.5 G/DL (ref 3.5–5.2)
ALP SERPL-CCNC: 114 U/L (ref 55–135)
ALT SERPL W/O P-5'-P-CCNC: 18 U/L (ref 10–44)
ANION GAP SERPL CALC-SCNC: 12 MMOL/L (ref 8–16)
AST SERPL-CCNC: 19 U/L (ref 10–40)
BILIRUB SERPL-MCNC: 0.4 MG/DL (ref 0.1–1)
BUN SERPL-MCNC: 20 MG/DL (ref 8–23)
CALCIUM SERPL-MCNC: 10 MG/DL (ref 8.7–10.5)
CCP AB SER IA-ACNC: <0.5 U/ML
CHLORIDE SERPL-SCNC: 100 MMOL/L (ref 95–110)
CO2 SERPL-SCNC: 25 MMOL/L (ref 23–29)
CREAT SERPL-MCNC: 1.2 MG/DL (ref 0.5–1.4)
CRP SERPL-MCNC: 48.2 MG/L (ref 0–8.2)
ERYTHROCYTE [DISTWIDTH] IN BLOOD BY AUTOMATED COUNT: 13.6 % (ref 11.5–14.5)
ERYTHROCYTE [SEDIMENTATION RATE] IN BLOOD BY WESTERGREN METHOD: 20 MM/HR (ref 0–36)
EST. GFR  (AFRICAN AMERICAN): 54 ML/MIN/1.73 M^2
EST. GFR  (NON AFRICAN AMERICAN): 46.9 ML/MIN/1.73 M^2
GLUCOSE SERPL-MCNC: 99 MG/DL (ref 70–110)
HCT VFR BLD AUTO: 28.9 % (ref 37–48.5)
HGB BLD-MCNC: 9.5 G/DL (ref 12–16)
IMM GRANULOCYTES # BLD AUTO: 0.25 K/UL (ref 0–0.04)
MCH RBC QN AUTO: 33.6 PG (ref 27–31)
MCHC RBC AUTO-ENTMCNC: 32.9 G/DL (ref 32–36)
MCV RBC AUTO: 102 FL (ref 82–98)
NEUTROPHILS # BLD AUTO: 2.4 K/UL (ref 1.8–7.7)
PLATELET # BLD AUTO: 56 K/UL (ref 150–350)
PMV BLD AUTO: 10 FL (ref 9.2–12.9)
POTASSIUM SERPL-SCNC: 4.3 MMOL/L (ref 3.5–5.1)
PROT SERPL-MCNC: 7 G/DL (ref 6–8.4)
RBC # BLD AUTO: 2.83 M/UL (ref 4–5.4)
RHEUMATOID FACT SERPL-ACNC: <10 IU/ML (ref 0–15)
SODIUM SERPL-SCNC: 137 MMOL/L (ref 136–145)
WBC # BLD AUTO: 3.84 K/UL (ref 3.9–12.7)

## 2019-05-02 PROCEDURE — 86200 CCP ANTIBODY: CPT

## 2019-05-02 PROCEDURE — 85652 RBC SED RATE AUTOMATED: CPT

## 2019-05-02 PROCEDURE — 86235 NUCLEAR ANTIGEN ANTIBODY: CPT

## 2019-05-02 PROCEDURE — 99215 PR OFFICE/OUTPT VISIT, EST, LEVL V, 40-54 MIN: ICD-10-PCS | Mod: S$PBB,,, | Performed by: INTERNAL MEDICINE

## 2019-05-02 PROCEDURE — 86431 RHEUMATOID FACTOR QUANT: CPT

## 2019-05-02 PROCEDURE — 36415 COLL VENOUS BLD VENIPUNCTURE: CPT

## 2019-05-02 PROCEDURE — 99215 OFFICE O/P EST HI 40 MIN: CPT | Mod: S$PBB,,, | Performed by: INTERNAL MEDICINE

## 2019-05-02 PROCEDURE — 85027 COMPLETE CBC AUTOMATED: CPT

## 2019-05-02 PROCEDURE — 80053 COMPREHEN METABOLIC PANEL: CPT

## 2019-05-02 PROCEDURE — 99214 OFFICE O/P EST MOD 30 MIN: CPT | Mod: PBBFAC | Performed by: INTERNAL MEDICINE

## 2019-05-02 PROCEDURE — 99999 PR PBB SHADOW E&M-EST. PATIENT-LVL IV: CPT | Mod: PBBFAC,,, | Performed by: INTERNAL MEDICINE

## 2019-05-02 PROCEDURE — 99999 PR PBB SHADOW E&M-EST. PATIENT-LVL IV: ICD-10-PCS | Mod: PBBFAC,,, | Performed by: INTERNAL MEDICINE

## 2019-05-02 PROCEDURE — 86140 C-REACTIVE PROTEIN: CPT

## 2019-05-02 NOTE — PROGRESS NOTES
Hematology and Medical Oncology   Follow Up Note     5/2/2019    Primary Oncologic Diagnosis: AML, FLT 3 + and NPMN1+.   History of Present Ilness:   Sabrina Badillo) is a pleasant 68 y.o.female presents to clinic following 2 induction therapies for AML. She was in the hospital roughly 50 days to receive 7+3 and then FLAG JENNIFER.    Oncology History:   68 y.o. female admitted overnight for possible new AML. Came in from OSH with elevated WBC of 100.   05/25/2018: Pt is now on hydrea 2 grams BID, allopurinol 300 mg daily, and IVF. Pt may need rasburicase this weekend. She will undergo a BM bx and aspiration today. She is an induction candidate and will not be screened for research trials. She has anxiety for which she usually takes xanax, this was re-started.   05/26/2018 PICC placed. Reports she slept well last night. Anxiety improved with prn xanax. EF 65%, HIV and Hep panel negative. 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  6/12/2018: Day 15 of 7+3 induction, restaging BM bx done yesterday. On Midostaurin. Fever yesterday and BC with gram + cocci in clusters. On cefepime day 2 and will start Vanc today. Labial mucositis improving.   6/13/2018: Day 16 7+3. BC with staph aureus, identification pending. Surveillance blood cultures done today. Afebrile x 48 hours. On cefepime and Vanc. Labial mucositis resolved. No complaints of pain. Nausea controlled. No diarrhea. Primary complaint is fatigue.   6/14/20018: Day 17 of 7+3. Day 14 bone marrow results pending. BC with staph epidermis only sensitive to Vancomycin. Vanc day 3 and cefepime day 4. Vanc trough 12.2 last night. BP remains low but stable. Afebrile x 72 hours.   6/15/2018: Day 18 of 7+3. Day 14 bone marrow biopsy shows persistent disease with 15% blasts. Discussion with patient regarding treatment and she is deciding if she wants to proceed with re-induction vs outpatient maintenance. Temp of 100.4 overnight, unsustained. Day 5 Cefepime and Day 4 of Vanc  for staph epidermis. Repeat cultures NGTD. BP improved. Electrolytes (mag, phos, K and calcium) replaced aggressively this am and will repeat labs this afternoon. No complaints this am.   06/16/2018: Day 19 of 7+3. Day 2 of Flag-JENNIFER. Remains afebrile. Calcium remains low and have increased PO supplementation. Remains on vanc and aztreonam. Somewhat loopy feeling after receiving benadryl.   06/17/2018: Day 20 of 7+3. Day 3 of FLAG-JENNIFER. Multiple electrolyte abnormalities. New bilateral leg and feet swelling.   6/18/2018: Day 21 of 7+3 and Day 4 of FLAG JENNIFER. Tolerating well with some nausea controlled with Zofran. VSS, afebrile. ANC 1900 on Neupogen. Continues Vanc for staph epi bacteremia. Multiple electrolytes replaced today. Complains of edema to lower extremities, not improved after 20 of lasix yesterday. No sob or CP.   6/19/18: Day 22 of 7+3 and Day 5 of FLAG JENNIFER. VSS, afebrile, ANC 3900. Vanc trough elevated and dose adjusted this am. Lower extremity edema improved after 40 of lasix yesterday, net negative 300 cc I&O. Mild nausea, no abdominal pain or diarrhea. Poor appetite but no difficulty eating or taking pills.   6/20/18: Day 23 of 7+3 and Day 6 of FLAG JENNIFER. Patient complains of nausea and vomiting overnight and this am, especially when taking pills. Will add Zyprexa daily in addition to Zofran, compazine, and ativan PRN and will change meds to IV. 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 Afebrile.  No diarrhea, mouth sores or pain.  06/21/2018: Day 24 of 7+3 and Day 7 of FLAG JENNIFER. Nausea better controlled. Continued on Vanc.  6/22/2018: Day 25 of 7+3 and Day 8 of FLAG JENNIFER. Nausea improved some with Zyprexa but did have 1 episode of emesis overnight.continuing meds IV due to vomiting. Remains afebrile. ANC 0. Continues Vanc and Flagyl. Electrolytes replaced.   06/23/2018 : day 26 of 7+3 and Day 9 of FLAG JENNIFER.  Nausea persists, worsened  after taking pills so meds converted to IV.  Encouraged ambulation.  Continue with flagyl for leptotrichia goodfellowii.  RUQ US showed intrahepatic dilation, overall impression hepatic steatosis.  CBD 0.5cm.  No Gallbladder.    06/24/2018 : day 27 of 7+3 and Day 10 of FLAG JENNIFER.  Nausea persisting, able to tolerate some po pills.  Last day of flagyl (day 5).  Still pancytopenic. Appetite still low as po intake triggers nausea.  06/25/2018: day 28 of 7+3 and Day 11 of FLAG JENNIFER. Afebrile, ANC 0. Has completed Flagyl. Will decrease Vanc to 1000 mg q12, trough 19.9, will complete Vanc on 6/27. Liver enzymes stable on Midostaurin. VSS.   6/26/2018: day 29 of 7+3 and day 12 of FLAG JENNIFER. Liver enzymes improved and Midostaurin increased to full dose 50 mg bid. Nausea controlled, afebrile, VSS, NEON.  6/27/2018: day 30 of 7+3 and Day 13 of FLAG JENNIFER. Persistent nausea and emesis x 1 yesterday. Compazine changed to scheduled. Vanc ends today. Afebrile, VSS. Aggressive electrolyte replacement, low electrolytes possibly due to Midostaurin.   6/28/2018: day 31 of 7+3 and Day 14 of FLAG JENNIFER. Nausea improved with scheduled Compazine. Patient has agreed to start converting some IV meds to po. VSS, afebrile, electrolytes wnl today. Only complains of fatigue, new rash noted to upper back and chest and legs, papular rash mildly red.  6/29/2018: Day 32 of 7+3 and Day 15 of FLAG JENNIFER. Day 14 restaging bone marrow biopsy done at bedside today. Patient states nausea improved but she did have emesis last night after taking 9 pm pills. Rash improved. No mouth sores, diarrhea or pain.   7/2/2018: Day 35 of 7+3 and Day 18 of FLAG JENNIFER. Restaging BM bx results pending. Fluid overload over the weekend. Chest x-ray with pulmonary edema. Os sats down to 88%. Placed on O2 at 2 L NC, off O2 this am. Received lasix. Net negative 2.7 L today. 1 episode of nausea, no emesis. Reports anxiety relieved with PRN meds. Electrolytes improved, afebrile, VSS.    7/3/2018: Day 36 of 7+3 and Day 19 of FLAG JENNIFER. Restaging Bm bx with GABRIEL. Cardiology consulted for abnormal echo, EF 30% with pulmonary HTN and severe L atrial enlargement. EKG with T wave abnormality, possible anterolateral ischemia and prolonged QTc of 530. Medications adjusted. Nausea controlled, no diarrhea. Electrolytes improved. On O2 as needed.   07/04/2018 : Day 37 of 7+3 and Day 20 of FLAG JENNIFER Diarrhea in AM, watery, no associated abdominal pain, nausea, or vomiting.    07/05/2018: Day 38 of 7+3 ane Day 21 of FLAG JENNIFER. No CP or SOB, cardiology following. On RA. All meds changed to po, denies nausea or vomiting and no diarrhea. VSS, afebrile.   7/6/2018: Day 22 of FLAG JENNIFER. Continues to tolerate po meds with no nausea. Afebrile. Awaiting count recovery for discharge.  7/7/2018-/8/2018: Day 40/41 of 7+3, Day 23/24 of FLAG JENNIFER.  Improving clinically.  Started on mag oxide per patient request.  Labs showing signs of ANC recovery.  7/9/2018: Day 25 FLAG JENNIFER, , continues Neupogen. Received platelets today. Afebrile, VSS. Tolerating all oral meds with no nausea or vomiting. Only complains of fatigue.   7/10/2018: Day 26 FLAG JENNIFER,  today. Had 2 episodes of vomiting and diarrhea last night. Feeling better. Afebrile, VSS.   7/11/2018: Day 27 FLAG JENINFER,  today. No vomiting or diarrhea overnight and no nausea. Afebrile, VSS. Complains of back pain (bone pain) suspect due to count recovery. Relieved with oxy IR and Zyrtec started.  07/12/2018: Day 28 flag jennifer, engrafted today with ANC of 730. Back pain improved with zyrtec. Scheduled for IT chemo tomorrow at 1:30 pm and discharge home thereafter. Will likely need plt transfusion prior to IT chemo tomorrow   7/13/2018: Day 29 of FLAG JENNIFER. ANC up to 1300 today. Transfusing platelets today prior to LP for IT chemo. Patient continues to complain of bone pain, mostly to back and legs. No nausea, diarrhea, sob. Afebrile and VSS.   --hospitalized  7/23-7/25 for C.diff diarrhea, neutropenic fever.  While inpatient she developed pink blisters on her palm that were concerning for relapse.  --palm biopsy on 7/26/18 for suspicion of leukemia cutis was also negative for sign of relapse  --bone marrow biopsy 7/31/18 was negative for signs of relapse   --Admitted on 8/14/18 for HiDAC#1  --Admitted on 9/11/18 for HiDAC#2  --Admitted on 10/16/18 for HiDAC#3  --Post consolidation bone marrow biopsy on 12/27/2018: AML FISH (BM) Interpretation: The result is within normal limits for the AML FISH panel. No evidence of residual disease.    Interval History:   Ms. Wilkes had a terrible Monday where everything hurt her and she felt just like she did when she was first diagnosed with leukemia. She has noticed a recurrence of the rash on her palms. Over the past several days she has slowly began to feel better and today is back to her old self.     About 10 days ago she had extensive dental work done where all of her lower teeth were pulled.     Past Medical History:   Past Medical History:   Diagnosis Date    Cancer 03/2018    AML    CHF (congestive heart failure)     Diarrhea 9/14/2018    Encounter for blood transfusion        Current Medications:   Current Outpatient Medications   Medication Sig    acyclovir (ZOVIRAX) 200 MG capsule Take 2 capsules (400 mg total) by mouth 2 (two) times daily.    ALPRAZolam (XANAX) 0.25 MG tablet Take 1 tablet (0.25 mg total) by mouth 3 (three) times daily as needed for Anxiety.    ciprofloxacin HCl (CIPRO) 500 MG tablet Take 1 tablet (500 mg total) by mouth every 12 (twelve) hours.    escitalopram oxalate (LEXAPRO) 10 MG tablet Take 10 mg by mouth once daily.    fluconazole (DIFLUCAN) 200 MG Tab Take 2 tablets (400 mg total) by mouth once daily.    LORazepam (ATIVAN) 0.5 MG tablet Take 1 tablet (0.5 mg total) by mouth every 6 (six) hours as needed (nausea).    magnesium oxide (MAG-OX) 400 mg tablet Take 2 tablets (800 mg total)  by mouth 2 (two) times daily.    metoprolol succinate (TOPROL-XL) 25 MG 24 hr tablet Take 0.5 tablets (12.5 mg total) by mouth once daily.    midostaurin (RYDAPT) 25 mg Cap Take 50 mg by mouth 2 (two) times daily on days 8 to 21 of cycle    nystatin (DUKE'S SOLUTION) Take 10 mLs by mouth 4 (four) times daily.    omeprazole (PRILOSEC OTC) 20 MG tablet Take 20 mg by mouth every morning.    ondansetron (ZOFRAN) 8 MG tablet Take 1 tablet (8 mg total) by mouth every 12 (twelve) hours as needed for Nausea.    oxyCODONE-acetaminophen (PERCOCET) 7.5-325 mg per tablet Take 1 tablet by mouth every 4 (four) hours as needed for Pain.    predniSONE (DELTASONE) 10 MG tablet Take 1 tablet (10 mg total) by mouth once daily. Prn arthritis flare    prochlorperazine (COMPAZINE) 10 MG tablet Take 1 tablet (10 mg total) by mouth 4 (four) times daily.    vitamin D 1000 units Tab Take 1 tablet (1,000 Units total) by mouth once daily.    walker (ULTRA-LIGHT ROLLATOR) Misc 1 Units by Misc.(Non-Drug; Combo Route) route once daily.     No current facility-administered medications for this visit.      ALLERGIES:   Review of patient's allergies indicates:   Allergen Reactions    Methotrexate analogues      Elevated Liver Enzyme    Bactrim [sulfamethoxazole-trimethoprim] Nausea And Vomiting and Rash       Review of Systems:     Review of Systems   Constitutional: Positive for fatigue. Negative for appetite change, chills, diaphoresis, fever and unexpected weight change.   HENT:   Negative for hearing loss, mouth sores, nosebleeds, sore throat, trouble swallowing and voice change.    Eyes: Negative for eye problems and icterus.   Respiratory: Negative for chest tightness, cough, hemoptysis, shortness of breath and wheezing.    Cardiovascular: Negative for chest pain, leg swelling and palpitations.   Gastrointestinal: Negative for abdominal distention, abdominal pain, blood in stool, diarrhea, nausea and vomiting.   Endocrine: Negative  for hot flashes.   Genitourinary: Negative for bladder incontinence, difficulty urinating, dysuria and hematuria.    Musculoskeletal: Positive for myalgias. Negative for arthralgias, back pain, flank pain, gait problem, neck pain and neck stiffness.   Skin: Negative for itching, rash and wound.   Neurological: Negative for dizziness, extremity weakness, gait problem, headaches, numbness, seizures and speech difficulty.   Hematological: Negative for adenopathy. Does not bruise/bleed easily.   Psychiatric/Behavioral: Positive for sleep disturbance. Negative for confusion and depression. The patient is not nervous/anxious.         Physical Exam:     There were no vitals filed for this visit.    Physical Exam   Constitutional: She is oriented to person, place, and time. She appears well-developed and well-nourished. No distress.   HENT:   Head: Normocephalic and atraumatic.   Mouth/Throat: Oropharynx is clear and moist. No oropharyngeal exudate.   Hair has regrown     Eyes: Pupils are equal, round, and reactive to light. Conjunctivae and EOM are normal.   Neck: Normal range of motion. Neck supple. No JVD present. No tracheal deviation present. No thyromegaly present.   Cardiovascular: Normal rate, regular rhythm and normal heart sounds. Exam reveals no friction rub.   No murmur heard.  Pulmonary/Chest: Effort normal and breath sounds normal. No stridor. No respiratory distress. She has no wheezes. She has no rales. She exhibits no tenderness.   Abdominal: Soft. Bowel sounds are normal. She exhibits no distension. There is no tenderness. There is no rebound and no guarding.   Musculoskeletal: Normal range of motion. She exhibits no edema, tenderness or deformity.   Lymphadenopathy:     She has no axillary adenopathy.   Neurological: She is alert and oriented to person, place, and time. She displays normal reflexes. No cranial nerve deficit or sensory deficit. She exhibits normal muscle tone. Coordination normal.   Skin:  Skin is warm and dry. Capillary refill takes less than 2 seconds. No rash noted. She is not diaphoretic. No erythema. No pallor.   Palmar rash   Psychiatric: She has a normal mood and affect. Her behavior is normal. Judgment and thought content normal.       ECOG Performance Status: (foot note - ECOG PS provided by Eastern Cooperative Oncology Group) 1 - Symptomatic but completely ambulatory    Karnofsky Performance Score:  80%- Normal Activity with Effort: Some Symptoms of Disease    Labs:   Lab Results   Component Value Date    WBC 3.84 (L) 05/02/2019    HGB 9.5 (L) 05/02/2019    HCT 28.9 (L) 05/02/2019    PLT 56 (L) 05/02/2019    ALT 18 05/02/2019    AST 19 05/02/2019     05/02/2019    K 4.3 05/02/2019     05/02/2019    CREATININE 1.2 05/02/2019    BUN 20 05/02/2019    CO2 25 05/02/2019    INR 1.1 05/31/2018       Imaging: previous imaging has been reviewed    Assessment and Plan:     Mrs. Mac is a pleasant 68 year old female with recently diagnosed AML.      Acute Monocytic Leukemia  -- Admitted from [facility] on 5/25/18 early AM with WBC around 100s, for suspected new non-M3 AML  --lo res HLA typing on 5/26/18 and hi res on 5/27/18 done in anticipation of possible need for future transplant   --Pt with two daughters, two full sisters (in their mid 60s, one with brain aneurysm hx, other one without any medical issues), and one full brother (59 y/o healthy).  --NPM1 +, CEBPA (-), FLT3 (+).  On Midostaurin 50 mg PO BID until Day 14 (held starting 6/8 to 6/11). Stopped when FLAG JENNIFER re-induction started. Restarted Midostaurin at 25 mg bid on day 8 of FLAG JENNIFER induction (6/22), increase to full dose 50 mg bid (6/26) as improvement in liver enzymes. Stopped 7/3/18 due to abnormal cardiac echo.  --day 14 bone marrow biopsy completed 6/11 showing persistent disease with 15% CD 34 positive blasts  --Day 60 of FLAG-JENNIFER, tolerating without difficulty except nausea/vomiting  --day 14 restaging bone  marrow biopsy done 6/29 without complication, results with no evidence of AML, FLT 3 negative, will need repeat marrow at count recovery outpatient   --recovery marrow showed no evidence of disease  --HiDAC #1 on 8/14/18, HiDAC#2 on 9/11/18  --Cycle 3 delayed 1 week due to pancytopenia. Started on 10/16/18  --recovery bone marrow showed no signs of residual leukemia  --Given significant dip in all three cell lines I am concerned for recurrence. Bone marrow biopsy will be arranged within the week.    Thrombocytopenia  --Previously in the 70-90 range  --Since christmas there had been a steady slow decline in her platelets which was concerning for relapse.   --The last 2 months the count has been consistent in the 80's, but are now 56    Suspected Leukemia Cutis  --pathology from dermatology confirmed inflammatory changes  --now resolved with topical steroid cream    Abnormal Liver Function Tests  --ALT and AST and Alk phos have again increased substantially since re-starting midostaurin.   --midostaurin started day 8 at 25 mg bid, monitoring liver enzymes closely and improved. Increasing Midostaurin to 50 mg bid 6/26. Stopped Midostaurin (7/3) due to new diagnosis of systolic heart failure EF 35%.  --6/22/18 liver US showing mild intrahepatic dilation, otherwise impression of hepatic steatosis.  Will hold off of MRCP at this time.    --permanently discontinued midostaurin     Chemotherapy induced cardiomyopathy  --cardiology follow-up as outpatient in 3 months  --repeat 2D echo on 6/15 with preserved EF of 60%.  However 7/2/18 echo with reduced EF 30-35%  --Echo on 8/15/18 was within normal range EF of 60-65%    Rheumatologic issues  --Previously on steroids and methotrexate  --working with [facility]      30 minutes were spent face to face with the patient and her  family to discuss the disease, natural history, treatment options and survival statistics. I have provided the patient with an opportunity to ask  questions and have all questions answered to her satisfaction.       she will return to the clinic in 2 weeks to discuss the bone marrow results, but knows to call in the interim if symptoms change or should a problem arise.        Laquita Troy MD  Hematology and Medical Oncology  Bone Marrow Transplant  [facility]

## 2019-05-02 NOTE — H&P (VIEW-ONLY)
Hematology and Medical Oncology   Follow Up Note     5/2/2019    Primary Oncologic Diagnosis: AML, FLT 3 + and NPMN1+.   History of Present Ilness:   Sabrina Badillo) is a pleasant 68 y.o.female presents to clinic following 2 induction therapies for AML. She was in the hospital roughly 50 days to receive 7+3 and then FLAG JENNIFER.    Oncology History:   68 y.o. female admitted overnight for possible new AML. Came in from OSH with elevated WBC of 100.   05/25/2018: Pt is now on hydrea 2 grams BID, allopurinol 300 mg daily, and IVF. Pt may need rasburicase this weekend. She will undergo a BM bx and aspiration today. She is an induction candidate and will not be screened for research trials. She has anxiety for which she usually takes xanax, this was re-started.   05/26/2018 PICC placed. Reports she slept well last night. Anxiety improved with prn xanax. EF 65%, HIV and Hep panel negative. 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  6/12/2018: Day 15 of 7+3 induction, restaging BM bx done yesterday. On Midostaurin. Fever yesterday and BC with gram + cocci in clusters. On cefepime day 2 and will start Vanc today. Labial mucositis improving.   6/13/2018: Day 16 7+3. BC with staph aureus, identification pending. Surveillance blood cultures done today. Afebrile x 48 hours. On cefepime and Vanc. Labial mucositis resolved. No complaints of pain. Nausea controlled. No diarrhea. Primary complaint is fatigue.   6/14/20018: Day 17 of 7+3. Day 14 bone marrow results pending. BC with staph epidermis only sensitive to Vancomycin. Vanc day 3 and cefepime day 4. Vanc trough 12.2 last night. BP remains low but stable. Afebrile x 72 hours.   6/15/2018: Day 18 of 7+3. Day 14 bone marrow biopsy shows persistent disease with 15% blasts. Discussion with patient regarding treatment and she is deciding if she wants to proceed with re-induction vs outpatient maintenance. Temp of 100.4 overnight, unsustained. Day 5 Cefepime and Day 4 of Vanc  for staph epidermis. Repeat cultures NGTD. BP improved. Electrolytes (mag, phos, K and calcium) replaced aggressively this am and will repeat labs this afternoon. No complaints this am.   06/16/2018: Day 19 of 7+3. Day 2 of Flag-JENNIFER. Remains afebrile. Calcium remains low and have increased PO supplementation. Remains on vanc and aztreonam. Somewhat loopy feeling after receiving benadryl.   06/17/2018: Day 20 of 7+3. Day 3 of FLAG-JENNIFER. Multiple electrolyte abnormalities. New bilateral leg and feet swelling.   6/18/2018: Day 21 of 7+3 and Day 4 of FLAG JENNIFER. Tolerating well with some nausea controlled with Zofran. VSS, afebrile. ANC 1900 on Neupogen. Continues Vanc for staph epi bacteremia. Multiple electrolytes replaced today. Complains of edema to lower extremities, not improved after 20 of lasix yesterday. No sob or CP.   6/19/18: Day 22 of 7+3 and Day 5 of FLAG JENNIFER. VSS, afebrile, ANC 3900. Vanc trough elevated and dose adjusted this am. Lower extremity edema improved after 40 of lasix yesterday, net negative 300 cc I&O. Mild nausea, no abdominal pain or diarrhea. Poor appetite but no difficulty eating or taking pills.   6/20/18: Day 23 of 7+3 and Day 6 of FLAG JENNIFER. Patient complains of nausea and vomiting overnight and this am, especially when taking pills. Will add Zyprexa daily in addition to Zofran, compazine, and ativan PRN and will change meds to IV. 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 Afebrile.  No diarrhea, mouth sores or pain.  06/21/2018: Day 24 of 7+3 and Day 7 of FLAG JENNIFER. Nausea better controlled. Continued on Vanc.  6/22/2018: Day 25 of 7+3 and Day 8 of FLAG JENNIFER. Nausea improved some with Zyprexa but did have 1 episode of emesis overnight.continuing meds IV due to vomiting. Remains afebrile. ANC 0. Continues Vanc and Flagyl. Electrolytes replaced.   06/23/2018 : day 26 of 7+3 and Day 9 of FLAG JENNIFER.  Nausea persists, worsened  after taking pills so meds converted to IV.  Encouraged ambulation.  Continue with flagyl for leptotrichia goodfellowii.  RUQ US showed intrahepatic dilation, overall impression hepatic steatosis.  CBD 0.5cm.  No Gallbladder.    06/24/2018 : day 27 of 7+3 and Day 10 of FLAG JENNIFER.  Nausea persisting, able to tolerate some po pills.  Last day of flagyl (day 5).  Still pancytopenic. Appetite still low as po intake triggers nausea.  06/25/2018: day 28 of 7+3 and Day 11 of FLAG JENNIFER. Afebrile, ANC 0. Has completed Flagyl. Will decrease Vanc to 1000 mg q12, trough 19.9, will complete Vanc on 6/27. Liver enzymes stable on Midostaurin. VSS.   6/26/2018: day 29 of 7+3 and day 12 of FLAG JENNIFER. Liver enzymes improved and Midostaurin increased to full dose 50 mg bid. Nausea controlled, afebrile, VSS, NEON.  6/27/2018: day 30 of 7+3 and Day 13 of FLAG JENNIFER. Persistent nausea and emesis x 1 yesterday. Compazine changed to scheduled. Vanc ends today. Afebrile, VSS. Aggressive electrolyte replacement, low electrolytes possibly due to Midostaurin.   6/28/2018: day 31 of 7+3 and Day 14 of FLAG JENNIFER. Nausea improved with scheduled Compazine. Patient has agreed to start converting some IV meds to po. VSS, afebrile, electrolytes wnl today. Only complains of fatigue, new rash noted to upper back and chest and legs, papular rash mildly red.  6/29/2018: Day 32 of 7+3 and Day 15 of FLAG JENNIFER. Day 14 restaging bone marrow biopsy done at bedside today. Patient states nausea improved but she did have emesis last night after taking 9 pm pills. Rash improved. No mouth sores, diarrhea or pain.   7/2/2018: Day 35 of 7+3 and Day 18 of FLAG JENNIFER. Restaging BM bx results pending. Fluid overload over the weekend. Chest x-ray with pulmonary edema. Os sats down to 88%. Placed on O2 at 2 L NC, off O2 this am. Received lasix. Net negative 2.7 L today. 1 episode of nausea, no emesis. Reports anxiety relieved with PRN meds. Electrolytes improved, afebrile, VSS.    7/3/2018: Day 36 of 7+3 and Day 19 of FLAG JENNIFER. Restaging Bm bx with GABRIEL. Cardiology consulted for abnormal echo, EF 30% with pulmonary HTN and severe L atrial enlargement. EKG with T wave abnormality, possible anterolateral ischemia and prolonged QTc of 530. Medications adjusted. Nausea controlled, no diarrhea. Electrolytes improved. On O2 as needed.   07/04/2018 : Day 37 of 7+3 and Day 20 of FLAG JENNIFER Diarrhea in AM, watery, no associated abdominal pain, nausea, or vomiting.    07/05/2018: Day 38 of 7+3 ane Day 21 of FLAG JENNIFER. No CP or SOB, cardiology following. On RA. All meds changed to po, denies nausea or vomiting and no diarrhea. VSS, afebrile.   7/6/2018: Day 22 of FLAG JENNIFER. Continues to tolerate po meds with no nausea. Afebrile. Awaiting count recovery for discharge.  7/7/2018-/8/2018: Day 40/41 of 7+3, Day 23/24 of FLAG JENNIFER.  Improving clinically.  Started on mag oxide per patient request.  Labs showing signs of ANC recovery.  7/9/2018: Day 25 FLAG JENNIFER, , continues Neupogen. Received platelets today. Afebrile, VSS. Tolerating all oral meds with no nausea or vomiting. Only complains of fatigue.   7/10/2018: Day 26 FLAG JENNIFER,  today. Had 2 episodes of vomiting and diarrhea last night. Feeling better. Afebrile, VSS.   7/11/2018: Day 27 FLAG JENNIFER,  today. No vomiting or diarrhea overnight and no nausea. Afebrile, VSS. Complains of back pain (bone pain) suspect due to count recovery. Relieved with oxy IR and Zyrtec started.  07/12/2018: Day 28 flag jennifer, engrafted today with ANC of 730. Back pain improved with zyrtec. Scheduled for IT chemo tomorrow at 1:30 pm and discharge home thereafter. Will likely need plt transfusion prior to IT chemo tomorrow   7/13/2018: Day 29 of FLAG JENNIFER. ANC up to 1300 today. Transfusing platelets today prior to LP for IT chemo. Patient continues to complain of bone pain, mostly to back and legs. No nausea, diarrhea, sob. Afebrile and VSS.   --hospitalized  7/23-7/25 for C.diff diarrhea, neutropenic fever.  While inpatient she developed pink blisters on her palm that were concerning for relapse.  --palm biopsy on 7/26/18 for suspicion of leukemia cutis was also negative for sign of relapse  --bone marrow biopsy 7/31/18 was negative for signs of relapse   --Admitted on 8/14/18 for HiDAC#1  --Admitted on 9/11/18 for HiDAC#2  --Admitted on 10/16/18 for HiDAC#3  --Post consolidation bone marrow biopsy on 12/27/2018: AML FISH (BM) Interpretation: The result is within normal limits for the AML FISH panel. No evidence of residual disease.    Interval History:   Ms. Wilkes had a terrible Monday where everything hurt her and she felt just like she did when she was first diagnosed with leukemia. She has noticed a recurrence of the rash on her palms. Over the past several days she has slowly began to feel better and today is back to her old self.     About 10 days ago she had extensive dental work done where all of her lower teeth were pulled.     Past Medical History:   Past Medical History:   Diagnosis Date    Cancer 03/2018    AML    CHF (congestive heart failure)     Diarrhea 9/14/2018    Encounter for blood transfusion        Current Medications:   Current Outpatient Medications   Medication Sig    acyclovir (ZOVIRAX) 200 MG capsule Take 2 capsules (400 mg total) by mouth 2 (two) times daily.    ALPRAZolam (XANAX) 0.25 MG tablet Take 1 tablet (0.25 mg total) by mouth 3 (three) times daily as needed for Anxiety.    ciprofloxacin HCl (CIPRO) 500 MG tablet Take 1 tablet (500 mg total) by mouth every 12 (twelve) hours.    escitalopram oxalate (LEXAPRO) 10 MG tablet Take 10 mg by mouth once daily.    fluconazole (DIFLUCAN) 200 MG Tab Take 2 tablets (400 mg total) by mouth once daily.    LORazepam (ATIVAN) 0.5 MG tablet Take 1 tablet (0.5 mg total) by mouth every 6 (six) hours as needed (nausea).    magnesium oxide (MAG-OX) 400 mg tablet Take 2 tablets (800 mg total)  by mouth 2 (two) times daily.    metoprolol succinate (TOPROL-XL) 25 MG 24 hr tablet Take 0.5 tablets (12.5 mg total) by mouth once daily.    midostaurin (RYDAPT) 25 mg Cap Take 50 mg by mouth 2 (two) times daily on days 8 to 21 of cycle    nystatin (DUKE'S SOLUTION) Take 10 mLs by mouth 4 (four) times daily.    omeprazole (PRILOSEC OTC) 20 MG tablet Take 20 mg by mouth every morning.    ondansetron (ZOFRAN) 8 MG tablet Take 1 tablet (8 mg total) by mouth every 12 (twelve) hours as needed for Nausea.    oxyCODONE-acetaminophen (PERCOCET) 7.5-325 mg per tablet Take 1 tablet by mouth every 4 (four) hours as needed for Pain.    predniSONE (DELTASONE) 10 MG tablet Take 1 tablet (10 mg total) by mouth once daily. Prn arthritis flare    prochlorperazine (COMPAZINE) 10 MG tablet Take 1 tablet (10 mg total) by mouth 4 (four) times daily.    vitamin D 1000 units Tab Take 1 tablet (1,000 Units total) by mouth once daily.    walker (ULTRA-LIGHT ROLLATOR) Misc 1 Units by Misc.(Non-Drug; Combo Route) route once daily.     No current facility-administered medications for this visit.      ALLERGIES:   Review of patient's allergies indicates:   Allergen Reactions    Methotrexate analogues      Elevated Liver Enzyme    Bactrim [sulfamethoxazole-trimethoprim] Nausea And Vomiting and Rash       Review of Systems:     Review of Systems   Constitutional: Positive for fatigue. Negative for appetite change, chills, diaphoresis, fever and unexpected weight change.   HENT:   Negative for hearing loss, mouth sores, nosebleeds, sore throat, trouble swallowing and voice change.    Eyes: Negative for eye problems and icterus.   Respiratory: Negative for chest tightness, cough, hemoptysis, shortness of breath and wheezing.    Cardiovascular: Negative for chest pain, leg swelling and palpitations.   Gastrointestinal: Negative for abdominal distention, abdominal pain, blood in stool, diarrhea, nausea and vomiting.   Endocrine: Negative  for hot flashes.   Genitourinary: Negative for bladder incontinence, difficulty urinating, dysuria and hematuria.    Musculoskeletal: Positive for myalgias. Negative for arthralgias, back pain, flank pain, gait problem, neck pain and neck stiffness.   Skin: Negative for itching, rash and wound.   Neurological: Negative for dizziness, extremity weakness, gait problem, headaches, numbness, seizures and speech difficulty.   Hematological: Negative for adenopathy. Does not bruise/bleed easily.   Psychiatric/Behavioral: Positive for sleep disturbance. Negative for confusion and depression. The patient is not nervous/anxious.         Physical Exam:     There were no vitals filed for this visit.    Physical Exam   Constitutional: She is oriented to person, place, and time. She appears well-developed and well-nourished. No distress.   HENT:   Head: Normocephalic and atraumatic.   Mouth/Throat: Oropharynx is clear and moist. No oropharyngeal exudate.   Hair has regrown     Eyes: Pupils are equal, round, and reactive to light. Conjunctivae and EOM are normal.   Neck: Normal range of motion. Neck supple. No JVD present. No tracheal deviation present. No thyromegaly present.   Cardiovascular: Normal rate, regular rhythm and normal heart sounds. Exam reveals no friction rub.   No murmur heard.  Pulmonary/Chest: Effort normal and breath sounds normal. No stridor. No respiratory distress. She has no wheezes. She has no rales. She exhibits no tenderness.   Abdominal: Soft. Bowel sounds are normal. She exhibits no distension. There is no tenderness. There is no rebound and no guarding.   Musculoskeletal: Normal range of motion. She exhibits no edema, tenderness or deformity.   Lymphadenopathy:     She has no axillary adenopathy.   Neurological: She is alert and oriented to person, place, and time. She displays normal reflexes. No cranial nerve deficit or sensory deficit. She exhibits normal muscle tone. Coordination normal.   Skin:  Skin is warm and dry. Capillary refill takes less than 2 seconds. No rash noted. She is not diaphoretic. No erythema. No pallor.   Palmar rash   Psychiatric: She has a normal mood and affect. Her behavior is normal. Judgment and thought content normal.       ECOG Performance Status: (foot note - ECOG PS provided by Eastern Cooperative Oncology Group) 1 - Symptomatic but completely ambulatory    Karnofsky Performance Score:  80%- Normal Activity with Effort: Some Symptoms of Disease    Labs:   Lab Results   Component Value Date    WBC 3.84 (L) 05/02/2019    HGB 9.5 (L) 05/02/2019    HCT 28.9 (L) 05/02/2019    PLT 56 (L) 05/02/2019    ALT 18 05/02/2019    AST 19 05/02/2019     05/02/2019    K 4.3 05/02/2019     05/02/2019    CREATININE 1.2 05/02/2019    BUN 20 05/02/2019    CO2 25 05/02/2019    INR 1.1 05/31/2018       Imaging: previous imaging has been reviewed    Assessment and Plan:     Mrs. Mac is a pleasant 68 year old female with recently diagnosed AML.      Acute Monocytic Leukemia  -- Admitted from [facility] on [facility]/18 early AM with WBC around 100s, for suspected new non-M3 AML  --lo res HLA typing on 5/26/18 and hi res on 5/27/18 done in anticipation of possible need for future transplant   --Pt with two daughters, two full sisters (in their mid 60s, one with brain aneurysm hx, other one without any medical issues), and one full brother (61 y/o healthy).  --NPM1 +, CEBPA (-), FLT3 (+).  On Midostaurin 50 mg PO BID until Day 14 (held starting 6/8 to 6/11). Stopped when FLAG JENNIFER re-induction started. Restarted Midostaurin at 25 mg bid on day 8 of FLAG JENNIFER induction (6/22), increase to full dose 50 mg bid (6/26) as improvement in liver enzymes. Stopped 7/3/18 due to abnormal cardiac echo.  --day 14 bone marrow biopsy completed 6/11 showing persistent disease with 15% CD 34 positive blasts  --Day 60 of FLAG-JENNIFER, tolerating without difficulty except nausea/vomiting  --day 14 restaging bone  marrow biopsy done 6/29 without complication, results with no evidence of AML, FLT 3 negative, will need repeat marrow at count recovery outpatient   --recovery marrow showed no evidence of disease  --HiDAC #1 on 8/14/18, HiDAC#2 on 9/11/18  --Cycle 3 delayed 1 week due to pancytopenia. Started on 10/16/18  --recovery bone marrow showed no signs of residual leukemia  --Given significant dip in all three cell lines I am concerned for recurrence. Bone marrow biopsy will be arranged within the week.    Thrombocytopenia  --Previously in the 70-90 range  --Since christmas there had been a steady slow decline in her platelets which was concerning for relapse.   --The last 2 months the count has been consistent in the 80's, but are now 56    Suspected Leukemia Cutis  --pathology from dermatology confirmed inflammatory changes  --now resolved with topical steroid cream    Abnormal Liver Function Tests  --ALT and AST and Alk phos have again increased substantially since re-starting midostaurin.   --midostaurin started day 8 at 25 mg bid, monitoring liver enzymes closely and improved. Increasing Midostaurin to 50 mg bid 6/26. Stopped Midostaurin (7/3) due to new diagnosis of systolic heart failure EF 35%.  --6/22/18 liver US showing mild intrahepatic dilation, otherwise impression of hepatic steatosis.  Will hold off of MRCP at this time.    --permanently discontinued midostaurin     Chemotherapy induced cardiomyopathy  --cardiology follow-up as outpatient in 3 months  --repeat 2D echo on 6/15 with preserved EF of 60%.  However 7/2/18 echo with reduced EF 30-35%  --Echo on 8/15/18 was within normal range EF of 60-65%    Rheumatologic issues  --Previously on steroids and methotrexate  --working with [facility]      30 minutes were spent face to face with the patient and her  family to discuss the disease, natural history, treatment options and survival statistics. I have provided the patient with an opportunity to ask  questions and have all questions answered to her satisfaction.       she will return to the clinic in 2 weeks to discuss the bone marrow results, but knows to call in the interim if symptoms change or should a problem arise.        Laquita Troy MD  Hematology and Medical Oncology  Bone Marrow Transplant  [facility]

## 2019-05-05 ENCOUNTER — PATIENT MESSAGE (OUTPATIENT)
Dept: RHEUMATOLOGY | Facility: CLINIC | Age: 69
End: 2019-05-05

## 2019-05-06 LAB — ENA SCL70 AB SER-ACNC: 2 UNITS

## 2019-05-07 DIAGNOSIS — I10 HYPERTENSION, UNSPECIFIED TYPE: Primary | ICD-10-CM

## 2019-05-07 RX ORDER — METOPROLOL SUCCINATE 25 MG/1
25 TABLET, EXTENDED RELEASE ORAL DAILY
Qty: 30 TABLET | Refills: 11 | Status: SHIPPED | OUTPATIENT
Start: 2019-05-07 | End: 2020-05-22

## 2019-05-07 RX ORDER — TOPIRAMATE SPINKLE 25 MG/1
25 CAPSULE ORAL 2 TIMES DAILY
Qty: 60 CAPSULE | Refills: 2 | Status: SHIPPED | OUTPATIENT
Start: 2019-05-07 | End: 2019-05-07 | Stop reason: CLARIF

## 2019-05-08 LAB — RNA POLYMERASE III ANTIBODIES, IGG, SERUM: <10 U

## 2019-05-09 ENCOUNTER — ANESTHESIA (OUTPATIENT)
Dept: SURGERY | Facility: HOSPITAL | Age: 69
End: 2019-05-09
Payer: MEDICARE

## 2019-05-09 ENCOUNTER — ANESTHESIA EVENT (OUTPATIENT)
Dept: SURGERY | Facility: HOSPITAL | Age: 69
End: 2019-05-09
Payer: MEDICARE

## 2019-05-09 ENCOUNTER — HOSPITAL ENCOUNTER (OUTPATIENT)
Facility: HOSPITAL | Age: 69
Discharge: HOME | End: 2019-05-09
Attending: INTERNAL MEDICINE | Admitting: INTERNAL MEDICINE
Payer: MEDICARE

## 2019-05-09 VITALS
OXYGEN SATURATION: 96 % | HEART RATE: 64 BPM | BODY MASS INDEX: 28.16 KG/M2 | WEIGHT: 153 LBS | DIASTOLIC BLOOD PRESSURE: 60 MMHG | TEMPERATURE: 98 F | RESPIRATION RATE: 15 BRPM | SYSTOLIC BLOOD PRESSURE: 120 MMHG | HEIGHT: 62 IN

## 2019-05-09 DIAGNOSIS — M25.50 POLYARTHRALGIA: Primary | ICD-10-CM

## 2019-05-09 DIAGNOSIS — C93.01 ACUTE MONOCYTIC LEUKEMIA IN REMISSION: Primary | ICD-10-CM

## 2019-05-09 DIAGNOSIS — C92.00 AML (ACUTE MYELOBLASTIC LEUKEMIA): ICD-10-CM

## 2019-05-09 PROCEDURE — 88237 TISSUE CULTURE BONE MARROW: CPT

## 2019-05-09 PROCEDURE — 88311 TISSUE SPECIMEN TO PATHOLOGY, BONE MARROW ASPIRATION/BIOPSY PROCEDURE: ICD-10-PCS | Mod: 26,,, | Performed by: PATHOLOGY

## 2019-05-09 PROCEDURE — 85097 BONE MARROW INTERPRETATION: CPT | Mod: ,,, | Performed by: PATHOLOGY

## 2019-05-09 PROCEDURE — 85097 TISSUE SPECIMEN TO PATHOLOGY, BONE MARROW ASPIRATION/BIOPSY PROCEDURE: ICD-10-PCS | Mod: ,,, | Performed by: PATHOLOGY

## 2019-05-09 PROCEDURE — 38222 PR BONE MARROW BIOPSY(IES) W/ASPIRATION(S); DIAGNOSTIC: ICD-10-PCS | Mod: LT,,, | Performed by: NURSE PRACTITIONER

## 2019-05-09 PROCEDURE — 36000704 HC OR TIME LEV I 1ST 15 MIN: Performed by: INTERNAL MEDICINE

## 2019-05-09 PROCEDURE — 88189 FLOWCYTOMETRY/READ 16 & >: CPT | Mod: ,,, | Performed by: PATHOLOGY

## 2019-05-09 PROCEDURE — 88341 IMHCHEM/IMCYTCHM EA ADD ANTB: CPT | Mod: 26,59,, | Performed by: PATHOLOGY

## 2019-05-09 PROCEDURE — 36000705 HC OR TIME LEV I EA ADD 15 MIN: Performed by: INTERNAL MEDICINE

## 2019-05-09 PROCEDURE — 25000003 PHARM REV CODE 250: Performed by: NURSE ANESTHETIST, CERTIFIED REGISTERED

## 2019-05-09 PROCEDURE — 88342 IMHCHEM/IMCYTCHM 1ST ANTB: CPT | Mod: 26,59,, | Performed by: PATHOLOGY

## 2019-05-09 PROCEDURE — 88313 SPECIAL STAINS GROUP 2: CPT

## 2019-05-09 PROCEDURE — D9220A PRA ANESTHESIA: ICD-10-PCS | Mod: ANES,,, | Performed by: ANESTHESIOLOGY

## 2019-05-09 PROCEDURE — 88305 TISSUE EXAM BY PATHOLOGIST: CPT | Mod: 59 | Performed by: PATHOLOGY

## 2019-05-09 PROCEDURE — 88313 SPECIAL STAINS GROUP 2: CPT | Mod: 26,,, | Performed by: PATHOLOGY

## 2019-05-09 PROCEDURE — 88305 TISSUE SPECIMEN TO PATHOLOGY, BONE MARROW ASPIRATION/BIOPSY PROCEDURE: ICD-10-PCS | Mod: 26,,, | Performed by: PATHOLOGY

## 2019-05-09 PROCEDURE — 63600175 PHARM REV CODE 636 W HCPCS: Performed by: NURSE ANESTHETIST, CERTIFIED REGISTERED

## 2019-05-09 PROCEDURE — 71000015 HC POSTOP RECOV 1ST HR: Performed by: INTERNAL MEDICINE

## 2019-05-09 PROCEDURE — 88313 TISSUE SPECIMEN TO PATHOLOGY, BONE MARROW ASPIRATION/BIOPSY PROCEDURE: ICD-10-PCS | Mod: 26,,, | Performed by: PATHOLOGY

## 2019-05-09 PROCEDURE — 88342 TISSUE SPECIMEN TO PATHOLOGY, BONE MARROW ASPIRATION/BIOPSY PROCEDURE: ICD-10-PCS | Mod: 26,59,, | Performed by: PATHOLOGY

## 2019-05-09 PROCEDURE — 38222 DX BONE MARROW BX & ASPIR: CPT | Mod: LT,,, | Performed by: NURSE PRACTITIONER

## 2019-05-09 PROCEDURE — 37000008 HC ANESTHESIA 1ST 15 MINUTES: Performed by: INTERNAL MEDICINE

## 2019-05-09 PROCEDURE — 88305 TISSUE EXAM BY PATHOLOGIST: CPT | Mod: 26,,, | Performed by: PATHOLOGY

## 2019-05-09 PROCEDURE — 71000044 HC DOSC ROUTINE RECOVERY FIRST HOUR: Performed by: INTERNAL MEDICINE

## 2019-05-09 PROCEDURE — 88184 FLOWCYTOMETRY/ TC 1 MARKER: CPT | Performed by: PATHOLOGY

## 2019-05-09 PROCEDURE — D9220A PRA ANESTHESIA: Mod: CRNA,,, | Performed by: NURSE ANESTHETIST, CERTIFIED REGISTERED

## 2019-05-09 PROCEDURE — 37000009 HC ANESTHESIA EA ADD 15 MINS: Performed by: INTERNAL MEDICINE

## 2019-05-09 PROCEDURE — 88189 PR  FLOWCYTOMETRY/READ, 16 & > MARKERS: ICD-10-PCS | Mod: ,,, | Performed by: PATHOLOGY

## 2019-05-09 PROCEDURE — 88341 TISSUE SPECIMEN TO PATHOLOGY, BONE MARROW ASPIRATION/BIOPSY PROCEDURE: ICD-10-PCS | Mod: 26,59,, | Performed by: PATHOLOGY

## 2019-05-09 PROCEDURE — 88311 DECALCIFY TISSUE: CPT | Mod: 26,,, | Performed by: PATHOLOGY

## 2019-05-09 PROCEDURE — D9220A PRA ANESTHESIA: ICD-10-PCS | Mod: CRNA,,, | Performed by: NURSE ANESTHETIST, CERTIFIED REGISTERED

## 2019-05-09 PROCEDURE — 88185 FLOWCYTOMETRY/TC ADD-ON: CPT | Mod: 59 | Performed by: PATHOLOGY

## 2019-05-09 PROCEDURE — 88264 CHROMOSOME ANALYSIS 20-25: CPT

## 2019-05-09 PROCEDURE — D9220A PRA ANESTHESIA: Mod: ANES,,, | Performed by: ANESTHESIOLOGY

## 2019-05-09 RX ORDER — PROPOFOL 10 MG/ML
VIAL (ML) INTRAVENOUS CONTINUOUS PRN
Status: DISCONTINUED | OUTPATIENT
Start: 2019-05-09 | End: 2019-05-09

## 2019-05-09 RX ORDER — LIDOCAINE HCL/PF 100 MG/5ML
SYRINGE (ML) INTRAVENOUS
Status: DISCONTINUED | OUTPATIENT
Start: 2019-05-09 | End: 2019-05-09

## 2019-05-09 RX ORDER — PROPOFOL 10 MG/ML
VIAL (ML) INTRAVENOUS
Status: DISCONTINUED | OUTPATIENT
Start: 2019-05-09 | End: 2019-05-09

## 2019-05-09 RX ORDER — HYDROMORPHONE HYDROCHLORIDE 1 MG/ML
0.2 INJECTION, SOLUTION INTRAMUSCULAR; INTRAVENOUS; SUBCUTANEOUS EVERY 5 MIN PRN
Status: DISCONTINUED | OUTPATIENT
Start: 2019-05-09 | End: 2019-05-09 | Stop reason: HOSPADM

## 2019-05-09 RX ORDER — SODIUM CHLORIDE 9 MG/ML
INJECTION, SOLUTION INTRAVENOUS CONTINUOUS PRN
Status: DISCONTINUED | OUTPATIENT
Start: 2019-05-09 | End: 2019-05-09

## 2019-05-09 RX ORDER — MEPERIDINE HYDROCHLORIDE 50 MG/ML
12.5 INJECTION INTRAMUSCULAR; INTRAVENOUS; SUBCUTANEOUS ONCE AS NEEDED
Status: DISCONTINUED | OUTPATIENT
Start: 2019-05-09 | End: 2019-05-09 | Stop reason: HOSPADM

## 2019-05-09 RX ORDER — SODIUM CHLORIDE 9 MG/ML
INJECTION, SOLUTION INTRAVENOUS CONTINUOUS
Status: DISCONTINUED | OUTPATIENT
Start: 2019-05-09 | End: 2019-05-09 | Stop reason: HOSPADM

## 2019-05-09 RX ADMIN — PROPOFOL 80 MG: 10 INJECTION, EMULSION INTRAVENOUS at 10:05

## 2019-05-09 RX ADMIN — LIDOCAINE HYDROCHLORIDE 40 MG: 20 INJECTION, SOLUTION INTRAVENOUS at 10:05

## 2019-05-09 RX ADMIN — SODIUM CHLORIDE: 0.9 INJECTION, SOLUTION INTRAVENOUS at 10:05

## 2019-05-09 RX ADMIN — PROPOFOL 150 MCG/KG/MIN: 10 INJECTION, EMULSION INTRAVENOUS at 10:05

## 2019-05-09 NOTE — INTERVAL H&P NOTE
The patient has been examined and the H&P has been reviewed:    I concur with the findings and no changes have occurred since H&P was written.    Anesthesia/Surgery risks, benefits and alternative options discussed and understood by patient/family. Ok to proceed with BMBx.          Active Hospital Problems    Diagnosis  POA    AML (acute myeloblastic leukemia) [C92.00]  Yes      Resolved Hospital Problems   No resolved problems to display.

## 2019-05-09 NOTE — PLAN OF CARE
Discharge instructions given. Patient verbalized understanding. Consents in chart. No complaints at this time. Pt tolerating po fluids

## 2019-05-09 NOTE — DISCHARGE SUMMARY
[facility]  Hematology  Bone Marrow Transplant  Discharge Summary      Patient Name: Sabrina Mac  MRN: [id number]  Admission Date: 5/9/2019  Hospital Length of Stay: 0 days  Discharge Date and Time: 5/9/2019 11:30 AM  Attending Physician: Laquita Troy MD   Discharging Provider: Patty Quintana NP  Primary Care Provider: Primary Doctor Earnestine    HPI: Patient is followed by Dr. Troy for AML. She is s/p induction and consolidation chemotherapy.    Procedure(s) (LRB):  Biopsy-bone marrow (Left)     Hospital Course: Patient presents to OR for planned BMBx under sedation. Tolerated procedure well.     Pending Diagnostic Studies:     Procedure Component Value Units Date/Time    Bone Marrow Prep and Stain [[id number]] Collected:  05/09/19 0928    Order Status:  Sent Lab Status:  In process Updated:  05/09/19 0929    Specimen:  Bone Marrow     Chromosome Analysis, Bone Marrow [[id number]] Collected:  05/09/19 0928    Order Status:  Sent Lab Status:  In process Updated:  05/09/19 0928    Specimen:  Bone Marrow     Iron Stain, Bone Marrow [[id number]] Collected:  05/09/19 0928    Order Status:  Sent Lab Status:  In process Updated:  05/09/19 0929    Specimen:  Bone Marrow     Leukemia/Lymphoma Screen - Bone Marrow Left Posterior Iliac Crest [[id number]] Collected:  05/09/19 0928    Order Status:  Sent Lab Status:  In process Updated:  05/09/19 0928    Specimen:  Bone Marrow     Tissue Specimen to Pathology, Bone Marrow Aspiration/Biopsy Procedure [[id number]] Collected:  05/09/19 0928    Order Status:  Sent Lab Status:  No result     Specimen:  Bone Marrow Aspirate, Left Iliac Crest         Final Active Diagnoses:    Diagnosis Date Noted POA    AML (acute myeloblastic leukemia) [C92.00] 08/14/2018 Yes      Problems Resolved During this Admission:      Discharged Condition: stable    Disposition: Outpatient    Follow Up: with Dr. Troy as scheduled.    Patient Instructions:      Diet Adult Regular      Notify your health care provider if you experience any of the following:  temperature >100.4     Notify your health care provider if you experience any of the following:  severe uncontrolled pain     Notify your health care provider if you experience any of the following:  redness, tenderness, or signs of infection (pain, swelling, redness, odor or green/yellow discharge around incision site)     Remove dressing in 24 hours     Activity as tolerated     Medications:  Reconciled Home Medications:      Medication List      ASK your doctor about these medications    acyclovir 200 MG capsule  Commonly known as:  ZOVIRAX  Take 2 capsules (400 mg total) by mouth 2 (two) times daily.     ALPRAZolam 0.25 MG tablet  Commonly known as:  XANAX  Take 1 tablet (0.25 mg total) by mouth 3 (three) times daily as needed for Anxiety.     ciprofloxacin HCl 500 MG tablet  Commonly known as:  CIPRO  Take 1 tablet (500 mg total) by mouth every 12 (twelve) hours.     DUKE'S SOLUTION  Take 10 mLs by mouth 4 (four) times daily.     escitalopram oxalate 10 MG tablet  Commonly known as:  LEXAPRO  Take 10 mg by mouth once daily.     fluconazole 200 MG Tab  Commonly known as:  DIFLUCAN  Take 2 tablets (400 mg total) by mouth once daily.     LORazepam 0.5 MG tablet  Commonly known as:  ATIVAN  Take 1 tablet (0.5 mg total) by mouth every 6 (six) hours as needed (nausea).     magnesium oxide 400 mg (241.3 mg magnesium) tablet  Commonly known as:  MAG-OX  Take 2 tablets (800 mg total) by mouth 2 (two) times daily.     * metoprolol succinate 25 MG 24 hr tablet  Commonly known as:  TOPROL-XL  Take 0.5 tablets (12.5 mg total) by mouth once daily.     * metoprolol succinate 25 MG 24 hr tablet  Commonly known as:  TOPROL XL  Take 1 tablet (25 mg total) by mouth once daily.     midostaurin 25 mg Cap  Commonly known as:  RYDAPT  Take 50 mg by mouth 2 (two) times daily on days 8 to 21 of cycle     omeprazole 20 MG tablet  Commonly known as:  PRILOSEC  OTC  Take 20 mg by mouth every morning.     ondansetron 8 MG tablet  Commonly known as:  ZOFRAN  Take 1 tablet (8 mg total) by mouth every 12 (twelve) hours as needed for Nausea.     oxyCODONE-acetaminophen 7.5-325 mg per tablet  Commonly known as:  PERCOCET  Take 1 tablet by mouth every 4 (four) hours as needed for Pain.     prochlorperazine 10 MG tablet  Commonly known as:  COMPAZINE  Take 1 tablet (10 mg total) by mouth 4 (four) times daily.     vitamin D 1000 units Tab  Commonly known as:  VITAMIN D3  Take 1 tablet (1,000 Units total) by mouth once daily.     walker Misc  Commonly known as:  ULTRA-LIGHT ROLLATOR  1 Units by Misc.(Non-Drug; Combo Route) route once daily.         * This list has 2 medication(s) that are the same as other medications prescribed for you. Read the directions carefully, and ask your doctor or other care provider to review them with you.                Patty Quintana, NP  Bone Marrow Transplant  [facility]

## 2019-05-09 NOTE — TRANSFER OF CARE
"Anesthesia Transfer of Care Note    Patient: Sabrina Mac    Procedure(s) Performed: Procedure(s) (LRB):  Biopsy-bone marrow (Left)    Patient location: PACU    Anesthesia Type: general    Transport from OR: Transported from OR on 6-10 L/min O2 by face mask with adequate spontaneous ventilation    Post pain: adequate analgesia    Post assessment: no apparent anesthetic complications and tolerated procedure well    Post vital signs: stable    Level of consciousness: awake, alert and oriented    Nausea/Vomiting: no nausea/vomiting    Complications: none    Transfer of care protocol was followed      Last vitals:   Visit Vitals  BP (!) 121/58 (BP Location: Right arm, Patient Position: Lying)   Pulse 67   Temp 36.8 °C (98.2 °F) (Temporal)   Resp (!) 21   Ht 5' 2" (1.575 m)   Wt 69.4 kg (153 lb)   LMP  (LMP Unknown)   SpO2 100%   Breastfeeding? No   BMI 27.98 kg/m²     "

## 2019-05-09 NOTE — ANESTHESIA PREPROCEDURE EVALUATION
05/09/2019  Sabrina Mac is a 68 y.o., female.    Anesthesia Evaluation    I have reviewed the Patient Summary Reports.    I have reviewed the Nursing Notes.      Review of Systems  Anesthesia Hx:  No problems with previous Anesthesia    Hematology/Oncology:  Hematology Normal   Oncology Normal     EENT/Dental:EENT/Dental Normal   Cardiovascular:   CHF    Pulmonary:  Pulmonary Normal    Renal/:  Renal/ Normal     Hepatic/GI:  Hepatic/GI Normal    Musculoskeletal:  Musculoskeletal Normal    Neurological:  Neurology Normal    Endocrine:  Endocrine Normal    Dermatological:  Skin Normal    Psych:  Psychiatric Normal           Physical Exam  General:  Well nourished    Airway/Jaw/Neck:  Airway Findings: Mouth Opening: Normal Tongue: Normal  General Airway Assessment: Adult  Mallampati: II  TM Distance: Normal, at least 6 cm        Eyes/Ears/Nose:  EYES/EARS/NOSE FINDINGS: Normal   Dental:  Dental Findings: In tact   Chest/Lungs:  Chest/Lungs Clear    Heart/Vascular:  Heart Findings: Normal Heart murmur: negative Vascular Findings: Normal    Abdomen:  Abdomen Findings: Normal    Musculoskeletal:  Musculoskeletal Findings: Normal   Skin:  Skin Findings: Normal    Mental Status:  Mental Status Findings: Normal        Anesthesia Plan  Type of Anesthesia, risks & benefits discussed:  Anesthesia Type:  general  Patient's Preference:   Intra-op Monitoring Plan:   Intra-op Monitoring Plan Comments:   Post Op Pain Control Plan:   Post Op Pain Control Plan Comments:   Induction:   IV  Beta Blocker:  Patient is not currently on a Beta-Blocker (No further documentation required).       Informed Consent: Patient understands risks and agrees with Anesthesia plan.  Questions answered. Anesthesia consent signed with patient.  ASA Score: 2     Day of Surgery Review of History & Physical:    H&P update referred to the  surgeon.         Ready For Surgery From Anesthesia Perspective.

## 2019-05-09 NOTE — BRIEF OP NOTE
PROCEDURE NOTE:  Date of Procedure: 05/09/2019  Bone Marrow Biopsy and Aspiration  Indication: AML  Consent: Informed consent was obtained from patient.  Timeout: Done and documented.  Position: Right lateral  Site: Left posterior illiac crest.  Prep: Betadine.  Needle used: 11 gauge Jamshidi needle.  Anesthetic: 1% lidocaine 5 cc.  Biopsy: The biopsy needle was introduced into the marrow cavity and an aspirate was obtained without complications and sent for flow cytometry and cytogenetics. Core biopsy obtained without difficulty and sent for routine histologic examination.  Complications: None.  Disposition: The patient was discharged home per anesthesia protocol.  Blood loss: Minimal.     Patty Quintana, FNP  Hematology/Oncology/Bone Marrow Transplant

## 2019-05-09 NOTE — ANESTHESIA POSTPROCEDURE EVALUATION
Anesthesia Post Evaluation    Patient: Sabrina Mac    Procedure(s) Performed: Procedure(s) (LRB):  Biopsy-bone marrow (Left)    Final Anesthesia Type: general  Patient location during evaluation: PACU  Patient participation: Yes- Able to Participate  Level of consciousness: awake and alert  Post-procedure vital signs: reviewed and stable  Pain management: adequate  Airway patency: patent  PONV status at discharge: No PONV  Anesthetic complications: no      Cardiovascular status: blood pressure returned to baseline  Respiratory status: spontaneous ventilation and room air  Hydration status: euvolemic  Follow-up not needed.          Vitals Value Taken Time   /60 5/9/2019 11:12 AM   Temp 36.6 °C (97.9 °F) 5/9/2019 11:12 AM   Pulse 64 5/9/2019 11:12 AM   Resp 15 5/9/2019 11:12 AM   SpO2 96 % 5/9/2019 11:12 AM         No case tracking events are documented in the log.      Pain/Tariq Score: Tariq Score: 10 (5/9/2019 10:58 AM)

## 2019-05-09 NOTE — DISCHARGE INSTRUCTIONS
Discharge Instructions for patients having a Bone Marrow Aspiration / Biopsy    Keep bandage in place for 24 hours following procedure.   - Do not shower or take a tube bath during this time. (You may take a sponge bath.)  - Call the nurse or physician for excessive bleeding or pain.  - You may take Tylenol as needed for pain.     You have received medication to sedate you.  -Do not drive a car or operate heavy machinery for the rest of the day.  You may resume other activities as tolerated.    You can call [phone] for any problems during the hours of 8:00 AM- 5:00 PM.    For an emergency after 5;00 PM you can call [phone] and have the  page the Hematologist / Oncologist on call.

## 2019-05-10 LAB
BODY SITE - BONE MARROW: NORMAL
BONE MARROW IRON STAIN COMMENT: NORMAL
BONE MARROW WRIGHT STAIN COMMENT: NORMAL
CLINICAL DIAGNOSIS - BONE MARROW: NORMAL
FLOW CYTOMETRY ANTIBODIES ANALYZED - BONE MARROW: NORMAL
FLOW CYTOMETRY COMMENT - BONE MARROW: NORMAL
FLOW CYTOMETRY INTERPRETATION - BONE MARROW: NORMAL

## 2019-05-17 LAB
CHROM BANDING METHOD: NORMAL
CHROMOSOME ANALYSIS BM ADDITIONAL INFORMATION: NORMAL
CHROMOSOME ANALYSIS BM RELEASED BY: NORMAL
CHROMOSOME ANALYSIS BM RESULT SUMMARY: NORMAL
CLINICAL CYTOGENETICIST REVIEW: NORMAL
HLA DRB4 1: NORMAL
HLA SSO DNA TYPING CLASS I & II INTERPRETATION: NORMAL
HLA-A 1 SERO. EQUIV: 24
HLA-A 1: NORMAL
HLA-A 2 SERO. EQUIV: 31
HLA-A 2: NORMAL
HLA-A1 HI RES: NORMAL
HLA-B 1 SERO. EQUIV: 60
HLA-B 1: NORMAL
HLA-B 2 SERO. EQUIV: 44
HLA-B 2: NORMAL
HLA-BW 1 SERO. EQUIV: 6
HLA-BW 2 SERO. EQUIV: 4
HLA-C 1: NORMAL
HLA-C 2: NORMAL
HLA-CW 1 SERO. EQUIV: 10
HLA-CW 2 SERO. EQUIV: 5
HLA-DQ 1 SERO. EQUIV: 7
HLA-DQ 2 SERO. EQUIV: NORMAL
HLA-DQB1 1: NORMAL
HLA-DQB1 2: NORMAL
HLA-DRB1 1 SERO. EQUIV: 4
HLA-DRB1 1: NORMAL
HLA-DRB1 2 SERO. EQUIV: 11
HLA-DRB1 2: NORMAL
HLA-DRB3 1: NORMAL
HLA-DRB3 2: NORMAL
HLA-DRB345 1 SERO. EQUIV: 52
HLA-DRB345 2 SERO. EQUIV: 53
HLA-DRB4 2: NORMAL
HLA-DRB5 1: NORMAL
HLA-DRB5 2: NORMAL
HLATY INTERPRETATION: NORMAL
HRA TESTING DATE: NORMAL
KARYOTYP MAR: NORMAL
Lab: NORMAL
REASON FOR REFERRAL (NARRATIVE): NORMAL
REF LAB TEST METHOD: NORMAL
SPECIMEN SOURCE: NORMAL
SPECIMEN: NORMAL
SSDQB TESTING DATE: NORMAL
SSDRB TESTING DATE: NORMAL
SSOA TESTING DATE: NORMAL
SSOB TESTING DATE: NORMAL
SSOC TESTING DATE: NORMAL
SSODR TESTING DATE: NORMAL
TYSSO TESTING DATE: NORMAL

## 2019-05-22 DIAGNOSIS — M19.90 ARTHRITIS: ICD-10-CM

## 2019-05-22 RX ORDER — PREDNISONE 10 MG/1
10 TABLET ORAL DAILY
Qty: 30 TABLET | Refills: 10 | Status: SHIPPED | OUTPATIENT
Start: 2019-05-22 | End: 2019-06-28 | Stop reason: SDUPTHER

## 2019-05-24 ENCOUNTER — TELEPHONE (OUTPATIENT)
Dept: HEMATOLOGY/ONCOLOGY | Facility: CLINIC | Age: 69
End: 2019-05-24

## 2019-05-24 NOTE — TELEPHONE ENCOUNTER
Returned call to pt .   Pt  calling for refill on prednisone.   Informed pt  looked like Dr. Troy sent on 5/22 to [facility]'s pharmacy- 30 tablets with 11 refills.   Pt  verbalized understanding and thanked nurse.         ----- Message from Dilcia Velazquez sent at 5/24/2019  8:25 AM CDT -----  Contact: Jesi ()  Would like a call from the nurse, has a few questions to ask.     Contact:: [phone]

## 2019-06-19 ENCOUNTER — OFFICE VISIT (OUTPATIENT)
Dept: HEMATOLOGY/ONCOLOGY | Facility: CLINIC | Age: 69
End: 2019-06-19
Payer: MEDICARE

## 2019-06-19 ENCOUNTER — LAB VISIT (OUTPATIENT)
Dept: LAB | Facility: HOSPITAL | Age: 69
End: 2019-06-19
Payer: MEDICARE

## 2019-06-19 VITALS
WEIGHT: 164.88 LBS | HEIGHT: 63 IN | DIASTOLIC BLOOD PRESSURE: 63 MMHG | SYSTOLIC BLOOD PRESSURE: 142 MMHG | BODY MASS INDEX: 29.21 KG/M2 | HEART RATE: 87 BPM

## 2019-06-19 DIAGNOSIS — K12.31 MUCOSITIS DUE TO CHEMOTHERAPY: ICD-10-CM

## 2019-06-19 DIAGNOSIS — F41.8 DEPRESSION WITH ANXIETY: ICD-10-CM

## 2019-06-19 DIAGNOSIS — R79.89 ELEVATED LFTS: ICD-10-CM

## 2019-06-19 DIAGNOSIS — C92.00 ACUTE MYELOID LEUKEMIA NOT HAVING ACHIEVED REMISSION: Primary | ICD-10-CM

## 2019-06-19 DIAGNOSIS — D61.810 PANCYTOPENIA DUE TO CHEMOTHERAPY: ICD-10-CM

## 2019-06-19 DIAGNOSIS — C92.00 AML (ACUTE MYELOBLASTIC LEUKEMIA): ICD-10-CM

## 2019-06-19 LAB
ALBUMIN SERPL BCP-MCNC: 3.7 G/DL (ref 3.5–5.2)
ALP SERPL-CCNC: 85 U/L (ref 55–135)
ALT SERPL W/O P-5'-P-CCNC: 22 U/L (ref 10–44)
ANION GAP SERPL CALC-SCNC: 9 MMOL/L (ref 8–16)
AST SERPL-CCNC: 23 U/L (ref 10–40)
BILIRUB SERPL-MCNC: 0.2 MG/DL (ref 0.1–1)
BUN SERPL-MCNC: 18 MG/DL (ref 8–23)
CALCIUM SERPL-MCNC: 9.6 MG/DL (ref 8.7–10.5)
CHLORIDE SERPL-SCNC: 103 MMOL/L (ref 95–110)
CO2 SERPL-SCNC: 25 MMOL/L (ref 23–29)
CREAT SERPL-MCNC: 0.9 MG/DL (ref 0.5–1.4)
ERYTHROCYTE [DISTWIDTH] IN BLOOD BY AUTOMATED COUNT: 14.4 % (ref 11.5–14.5)
EST. GFR  (AFRICAN AMERICAN): >60 ML/MIN/1.73 M^2
EST. GFR  (NON AFRICAN AMERICAN): >60 ML/MIN/1.73 M^2
GLUCOSE SERPL-MCNC: 99 MG/DL (ref 70–110)
HCT VFR BLD AUTO: 32.3 % (ref 37–48.5)
HGB BLD-MCNC: 10.1 G/DL (ref 12–16)
IMM GRANULOCYTES # BLD AUTO: 0.28 K/UL (ref 0–0.04)
MCH RBC QN AUTO: 32.8 PG (ref 27–31)
MCHC RBC AUTO-ENTMCNC: 31.3 G/DL (ref 32–36)
MCV RBC AUTO: 105 FL (ref 82–98)
NEUTROPHILS # BLD AUTO: 3.6 K/UL (ref 1.8–7.7)
PLATELET # BLD AUTO: 101 K/UL (ref 150–350)
PMV BLD AUTO: 10.5 FL (ref 9.2–12.9)
POTASSIUM SERPL-SCNC: 4.6 MMOL/L (ref 3.5–5.1)
PROT SERPL-MCNC: 6.5 G/DL (ref 6–8.4)
RBC # BLD AUTO: 3.08 M/UL (ref 4–5.4)
SODIUM SERPL-SCNC: 137 MMOL/L (ref 136–145)
WBC # BLD AUTO: 5.43 K/UL (ref 3.9–12.7)

## 2019-06-19 PROCEDURE — 85027 COMPLETE CBC AUTOMATED: CPT

## 2019-06-19 PROCEDURE — 99215 OFFICE O/P EST HI 40 MIN: CPT | Mod: S$PBB,,, | Performed by: INTERNAL MEDICINE

## 2019-06-19 PROCEDURE — 99213 OFFICE O/P EST LOW 20 MIN: CPT | Mod: PBBFAC | Performed by: INTERNAL MEDICINE

## 2019-06-19 PROCEDURE — 99999 PR PBB SHADOW E&M-EST. PATIENT-LVL III: CPT | Mod: PBBFAC,,, | Performed by: INTERNAL MEDICINE

## 2019-06-19 PROCEDURE — 99999 PR PBB SHADOW E&M-EST. PATIENT-LVL III: ICD-10-PCS | Mod: PBBFAC,,, | Performed by: INTERNAL MEDICINE

## 2019-06-19 PROCEDURE — 36415 COLL VENOUS BLD VENIPUNCTURE: CPT

## 2019-06-19 PROCEDURE — 99215 PR OFFICE/OUTPT VISIT, EST, LEVL V, 40-54 MIN: ICD-10-PCS | Mod: S$PBB,,, | Performed by: INTERNAL MEDICINE

## 2019-06-19 PROCEDURE — 80053 COMPREHEN METABOLIC PANEL: CPT

## 2019-06-20 NOTE — PROGRESS NOTES
Hematology and Medical Oncology   Follow Up Note     06/19/2019    Primary Oncologic Diagnosis: AML, FLT 3 + and NPMN1+.   History of Present Ilness:   Sabrina Badillo) is a pleasant 69 y.o.female presents to clinic following 2 induction therapies for AML. She was in the hospital roughly 50 days to receive 7+3 and then FLAG JENNIFER.    Oncology History:   68 y.o. female admitted overnight for possible new AML. Came in from OSH with elevated WBC of 100.   05/25/2018: Pt is now on hydrea 2 grams BID, allopurinol 300 mg daily, and IVF. Pt may need rasburicase this weekend. She will undergo a BM bx and aspiration today. She is an induction candidate and will not be screened for research trials. She has anxiety for which she usually takes xanax, this was re-started.   05/26/2018 PICC placed. Reports she slept well last night. Anxiety improved with prn xanax. EF 65%, HIV and Hep panel negative. 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  6/12/2018: Day 15 of 7+3 induction, restaging BM bx done yesterday. On Midostaurin. Fever yesterday and BC with gram + cocci in clusters. On cefepime day 2 and will start Vanc today. Labial mucositis improving.   6/13/2018: Day 16 7+3. BC with staph aureus, identification pending. Surveillance blood cultures done today. Afebrile x 48 hours. On cefepime and Vanc. Labial mucositis resolved. No complaints of pain. Nausea controlled. No diarrhea. Primary complaint is fatigue.   6/14/20018: Day 17 of 7+3. Day 14 bone marrow results pending. BC with staph epidermis only sensitive to Vancomycin. Vanc day 3 and cefepime day 4. Vanc trough 12.2 last night. BP remains low but stable. Afebrile x 72 hours.   6/15/2018: Day 18 of 7+3. Day 14 bone marrow biopsy shows persistent disease with 15% blasts. Discussion with patient regarding treatment and she is deciding if she wants to proceed with re-induction vs outpatient maintenance. Temp of 100.4 overnight, unsustained. Day 5 Cefepime and Day 4 of  Vanc for staph epidermis. Repeat cultures NGTD. BP improved. Electrolytes (mag, phos, K and calcium) replaced aggressively this am and will repeat labs this afternoon. No complaints this am.   06/16/2018: Day 19 of 7+3. Day 2 of Flag-JENNIFER. Remains afebrile. Calcium remains low and have increased PO supplementation. Remains on vanc and aztreonam. Somewhat loopy feeling after receiving benadryl.   06/17/2018: Day 20 of 7+3. Day 3 of FLAG-JENNIFER. Multiple electrolyte abnormalities. New bilateral leg and feet swelling.   6/18/2018: Day 21 of 7+3 and Day 4 of FLAG JENNIFER. Tolerating well with some nausea controlled with Zofran. VSS, afebrile. ANC 1900 on Neupogen. Continues Vanc for staph epi bacteremia. Multiple electrolytes replaced today. Complains of edema to lower extremities, not improved after 20 of lasix yesterday. No sob or CP.   6/19/18: Day 22 of 7+3 and Day 5 of FLAG JENNIFER. VSS, afebrile, ANC 3900. Vanc trough elevated and dose adjusted this am. Lower extremity edema improved after 40 of lasix yesterday, net negative 300 cc I&O. Mild nausea, no abdominal pain or diarrhea. Poor appetite but no difficulty eating or taking pills.   6/20/18: Day 23 of 7+3 and Day 6 of FLAG JENNIFER. Patient complains of nausea and vomiting overnight and this am, especially when taking pills. Will add Zyprexa daily in addition to Zofran, compazine, and ativan PRN and will change meds to IV. 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 Afebrile.  No diarrhea, mouth sores or pain.  06/21/2018: Day 24 of 7+3 and Day 7 of FLAG JENNIFER. Nausea better controlled. Continued on Vanc.  6/22/2018: Day 25 of 7+3 and Day 8 of FLAG JENNIFER. Nausea improved some with Zyprexa but did have 1 episode of emesis overnight.continuing meds IV due to vomiting. Remains afebrile. ANC 0. Continues Vanc and Flagyl. Electrolytes replaced.   06/23/2018 : day 26 of 7+3 and Day 9 of FLAG JENNIFER.  Nausea persists,  worsened after taking pills so meds converted to IV.  Encouraged ambulation.  Continue with flagyl for leptotrichia goodfellowii.  RUQ US showed intrahepatic dilation, overall impression hepatic steatosis.  CBD 0.5cm.  No Gallbladder.    06/24/2018 : day 27 of 7+3 and Day 10 of FLAG JENNIFER.  Nausea persisting, able to tolerate some po pills.  Last day of flagyl (day 5).  Still pancytopenic. Appetite still low as po intake triggers nausea.  06/25/2018: day 28 of 7+3 and Day 11 of FLAG JENNIFER. Afebrile, ANC 0. Has completed Flagyl. Will decrease Vanc to 1000 mg q12, trough 19.9, will complete Vanc on 6/27. Liver enzymes stable on Midostaurin. VSS.   6/26/2018: day 29 of 7+3 and day 12 of FLAG JENNIFER. Liver enzymes improved and Midostaurin increased to full dose 50 mg bid. Nausea controlled, afebrile, VSS, NEON.  6/27/2018: day 30 of 7+3 and Day 13 of FLAG JENNIFER. Persistent nausea and emesis x 1 yesterday. Compazine changed to scheduled. Vanc ends today. Afebrile, VSS. Aggressive electrolyte replacement, low electrolytes possibly due to Midostaurin.   6/28/2018: day 31 of 7+3 and Day 14 of FLAG JENNIFER. Nausea improved with scheduled Compazine. Patient has agreed to start converting some IV meds to po. VSS, afebrile, electrolytes wnl today. Only complains of fatigue, new rash noted to upper back and chest and legs, papular rash mildly red.  6/29/2018: Day 32 of 7+3 and Day 15 of FLAG JENNIFER. Day 14 restaging bone marrow biopsy done at bedside today. Patient states nausea improved but she did have emesis last night after taking 9 pm pills. Rash improved. No mouth sores, diarrhea or pain.   7/2/2018: Day 35 of 7+3 and Day 18 of FLAG JENNIFER. Restaging BM bx results pending. Fluid overload over the weekend. Chest x-ray with pulmonary edema. Os sats down to 88%. Placed on O2 at 2 L NC, off O2 this am. Received lasix. Net negative 2.7 L today. 1 episode of nausea, no emesis. Reports anxiety relieved with PRN meds. Electrolytes improved,  afebrile, VSS.   7/3/2018: Day 36 of 7+3 and Day 19 of FLAG JENNIFER. Restaging Bm bx with GABRIEL. Cardiology consulted for abnormal echo, EF 30% with pulmonary HTN and severe L atrial enlargement. EKG with T wave abnormality, possible anterolateral ischemia and prolonged QTc of 530. Medications adjusted. Nausea controlled, no diarrhea. Electrolytes improved. On O2 as needed.   07/04/2018 : Day 37 of 7+3 and Day 20 of FLAG JENNIFER Diarrhea in AM, watery, no associated abdominal pain, nausea, or vomiting.    07/05/2018: Day 38 of 7+3 ane Day 21 of FLAG JENNIFER. No CP or SOB, cardiology following. On RA. All meds changed to po, denies nausea or vomiting and no diarrhea. VSS, afebrile.   7/6/2018: Day 22 of FLAG JENNIFER. Continues to tolerate po meds with no nausea. Afebrile. Awaiting count recovery for discharge.  7/7/2018-/8/2018: Day 40/41 of 7+3, Day 23/24 of FLAG JENNIFER.  Improving clinically.  Started on mag oxide per patient request.  Labs showing signs of ANC recovery.  7/9/2018: Day 25 FLAG JENNIFER, , continues Neupogen. Received platelets today. Afebrile, VSS. Tolerating all oral meds with no nausea or vomiting. Only complains of fatigue.   7/10/2018: Day 26 FLAG JENNIFER,  today. Had 2 episodes of vomiting and diarrhea last night. Feeling better. Afebrile, VSS.   7/11/2018: Day 27 FLAG JENNIFER,  today. No vomiting or diarrhea overnight and no nausea. Afebrile, VSS. Complains of back pain (bone pain) suspect due to count recovery. Relieved with oxy IR and Zyrtec started.  07/12/2018: Day 28 flag jennifer, engrafted today with ANC of 730. Back pain improved with zyrtec. Scheduled for IT chemo tomorrow at 1:30 pm and discharge home thereafter. Will likely need plt transfusion prior to IT chemo tomorrow   7/13/2018: Day 29 of FLAG JENNIFER. ANC up to 1300 today. Transfusing platelets today prior to LP for IT chemo. Patient continues to complain of bone pain, mostly to back and legs. No nausea, diarrhea, sob. Afebrile and VSS.    --hospitalized 7/23-7/25 for C.diff diarrhea, neutropenic fever.  While inpatient she developed pink blisters on her palm that were concerning for relapse.  --palm biopsy on 7/26/18 for suspicion of leukemia cutis was also negative for sign of relapse  --bone marrow biopsy 7/31/18 was negative for signs of relapse   --Admitted on 8/14/18 for HiDAC#1  --Admitted on 9/11/18 for HiDAC#2  --Admitted on 10/16/18 for HiDAC#3  --Post consolidation bone marrow biopsy on 12/27/2018: AML FISH (BM) Interpretation: The result is within normal limits for the AML FISH panel. No evidence of residual disease.  --Bone Marrow Biopsy on 5/9/19 NO MORPHOLOGIC EVIDENCE OF RESIDUAL ACUTE MYELOID LEUKEMIA. RECOMMEND NGS STUDY TO RULE OUT RESIDUAL DISEASE. deletion 20q    Interval History:   Ms. Wilkes has been doing quite well. Her energy has returned and she is cooking and eating again. She is spending time with her grandkids and enjoying her days.    Her extensive dental work has healed.    Past Medical History:   Past Medical History:   Diagnosis Date    Cancer 03/2018    AML    CHF (congestive heart failure)     Diarrhea 9/14/2018    Encounter for blood transfusion        Current Medications:   Current Outpatient Medications   Medication Sig    acyclovir (ZOVIRAX) 200 MG capsule Take 2 capsules (400 mg total) by mouth 2 (two) times daily.    ciprofloxacin HCl (CIPRO) 500 MG tablet Take 1 tablet (500 mg total) by mouth every 12 (twelve) hours.    fluconazole (DIFLUCAN) 200 MG Tab Take 2 tablets (400 mg total) by mouth once daily.    LORazepam (ATIVAN) 0.5 MG tablet Take 1 tablet (0.5 mg total) by mouth every 6 (six) hours as needed (nausea).    magnesium oxide (MAG-OX) 400 mg tablet Take 2 tablets (800 mg total) by mouth 2 (two) times daily.    metoprolol succinate (TOPROL XL) 25 MG 24 hr tablet Take 1 tablet (25 mg total) by mouth once daily.    metoprolol succinate (TOPROL-XL) 25 MG 24 hr tablet Take 0.5 tablets (12.5  mg total) by mouth once daily.    midostaurin (RYDAPT) 25 mg Cap Take 50 mg by mouth 2 (two) times daily on days 8 to 21 of cycle    nystatin (DUKE'S SOLUTION) Take 10 mLs by mouth 4 (four) times daily.    omeprazole (PRILOSEC OTC) 20 MG tablet Take 20 mg by mouth every morning.    ondansetron (ZOFRAN) 8 MG tablet Take 1 tablet (8 mg total) by mouth every 12 (twelve) hours as needed for Nausea.    oxyCODONE-acetaminophen (PERCOCET) 7.5-325 mg per tablet Take 1 tablet by mouth every 4 (four) hours as needed for Pain.    predniSONE (DELTASONE) 10 MG tablet Take 1 tablet (10 mg total) by mouth once daily. Prn arthritis flare    prochlorperazine (COMPAZINE) 10 MG tablet Take 1 tablet (10 mg total) by mouth 4 (four) times daily.    vitamin D 1000 units Tab Take 1 tablet (1,000 Units total) by mouth once daily.    walker (ULTRA-LIGHT ROLLATOR) Misc 1 Units by Misc.(Non-Drug; Combo Route) route once daily.    ALPRAZolam (XANAX) 0.25 MG tablet Take 1 tablet (0.25 mg total) by mouth 3 (three) times daily as needed for Anxiety.     No current facility-administered medications for this visit.      ALLERGIES:   Review of patient's allergies indicates:   Allergen Reactions    Methotrexate analogues      Elevated Liver Enzyme    Bactrim [sulfamethoxazole-trimethoprim] Nausea And Vomiting and Rash       Review of Systems:     Review of Systems   Constitutional: Positive for fatigue. Negative for appetite change, chills, diaphoresis, fever and unexpected weight change.   HENT:   Negative for hearing loss, mouth sores, nosebleeds, sore throat, trouble swallowing and voice change.    Eyes: Negative for eye problems and icterus.   Respiratory: Negative for chest tightness, cough, hemoptysis, shortness of breath and wheezing.    Cardiovascular: Negative for chest pain, leg swelling and palpitations.   Gastrointestinal: Negative for abdominal distention, abdominal pain, blood in stool, diarrhea, nausea and vomiting.    Endocrine: Negative for hot flashes.   Genitourinary: Negative for bladder incontinence, difficulty urinating, dysuria and hematuria.    Musculoskeletal: Positive for myalgias. Negative for arthralgias, back pain, flank pain, gait problem, neck pain and neck stiffness.   Skin: Negative for itching, rash and wound.   Neurological: Negative for dizziness, extremity weakness, gait problem, headaches, numbness, seizures and speech difficulty.   Hematological: Negative for adenopathy. Does not bruise/bleed easily.   Psychiatric/Behavioral: Positive for sleep disturbance. Negative for confusion and depression. The patient is not nervous/anxious.         Physical Exam:     Vitals:    06/19/19 1338   BP: (!) 142/63   Pulse: 87       Physical Exam   Constitutional: She is oriented to person, place, and time. She appears well-developed and well-nourished. No distress.   HENT:   Head: Normocephalic and atraumatic.   Mouth/Throat: Oropharynx is clear and moist. No oropharyngeal exudate.   Hair has regrown     Eyes: Pupils are equal, round, and reactive to light. Conjunctivae and EOM are normal.   Neck: Normal range of motion. Neck supple. No JVD present. No tracheal deviation present. No thyromegaly present.   Cardiovascular: Normal rate, regular rhythm and normal heart sounds. Exam reveals no friction rub.   No murmur heard.  Pulmonary/Chest: Effort normal and breath sounds normal. No stridor. No respiratory distress. She has no wheezes. She has no rales. She exhibits no tenderness.   Abdominal: Soft. Bowel sounds are normal. She exhibits no distension. There is no tenderness. There is no rebound and no guarding.   Musculoskeletal: Normal range of motion. She exhibits no edema, tenderness or deformity.   Lymphadenopathy:     She has no axillary adenopathy.   Neurological: She is alert and oriented to person, place, and time. She displays normal reflexes. No cranial nerve deficit or sensory deficit. She exhibits normal muscle  tone. Coordination normal.   Skin: Skin is warm and dry. Capillary refill takes less than 2 seconds. No rash noted. She is not diaphoretic. No erythema. No pallor.   Palmar rash   Psychiatric: She has a normal mood and affect. Her behavior is normal. Judgment and thought content normal.       ECOG Performance Status: (foot note - ECOG PS provided by Eastern Cooperative Oncology Group) 1 - Symptomatic but completely ambulatory    Karnofsky Performance Score:  80%- Normal Activity with Effort: Some Symptoms of Disease    Labs:   Lab Results   Component Value Date    WBC 5.43 06/19/2019    HGB 10.1 (L) 06/19/2019    HCT 32.3 (L) 06/19/2019     (L) 06/19/2019    ALT 22 06/19/2019    AST 23 06/19/2019     06/19/2019    K 4.6 06/19/2019     06/19/2019    CREATININE 0.9 06/19/2019    BUN 18 06/19/2019    CO2 25 06/19/2019    INR 1.1 05/31/2018       Imaging: previous imaging has been reviewed    Assessment and Plan:     Mrs. Mac is a pleasant 69 year old female with recently diagnosed AML.      Acute Monocytic Leukemia  -- Admitted from [facility] on [facility]/18 early AM with WBC around 100s, for suspected new non-M3 AML  --lo res HLA typing on 5/26/18 and hi res on 5/27/18 done in anticipation of possible need for future transplant   --Pt with two daughters, two full sisters (in their mid 60s, one with brain aneurysm hx, other one without any medical issues), and one full brother (61 y/o healthy).  --NPM1 +, CEBPA (-), FLT3 (+).  On Midostaurin 50 mg PO BID until Day 14 (held starting 6/8 to 6/11). Stopped when FLAG JENNIFER re-induction started. Restarted Midostaurin at 25 mg bid on day 8 of FLAG JNENIFER induction (6/22), increase to full dose 50 mg bid (6/26) as improvement in liver enzymes. Stopped 7/3/18 due to abnormal cardiac echo.  --day 14 bone marrow biopsy completed 6/11 showing persistent disease with 15% CD 34 positive blasts  --Day 60 of FLAG-JENNIFER, tolerating without difficulty except  nausea/vomiting  --day 14 restaging bone marrow biopsy done 6/29 without complication, results with no evidence of AML, FLT 3 negative, will need repeat marrow at count recovery outpatient   --recovery marrow showed no evidence of disease  --HiDAC #1 on 8/14/18, HiDAC#2 on 9/11/18  --Cycle 3 delayed 1 week due to pancytopenia. Started on 10/16/18  --recovery bone marrow showed no signs of residual leukemia  --Bone marrow biopsy done in May shows no overt signs of recurrent disease    Thrombocytopenia  --Previously in the 70-90 range  --101 today    Suspected Leukemia Cutis  --pathology from dermatology confirmed inflammatory changes  --now resolved with topical steroid cream    Abnormal Liver Function Tests  --ALT and AST and Alk phos have again increased substantially since re-starting midostaurin.   --midostaurin started day 8 at 25 mg bid, monitoring liver enzymes closely and improved. Increasing Midostaurin to 50 mg bid 6/26. Stopped Midostaurin (7/3) due to new diagnosis of systolic heart failure EF 35%.  --6/22/18 liver US showing mild intrahepatic dilation, otherwise impression of hepatic steatosis.  Will hold off of MRCP at this time.    --permanently discontinued midostaurin     Chemotherapy induced cardiomyopathy  --cardiology follow-up as outpatient in 3 months  --repeat 2D echo on 6/15 with preserved EF of 60%.  However 7/2/18 echo with reduced EF 30-35%  --Echo on 8/15/18 was within normal range EF of 60-65%    Rheumatologic issues  --Previously on steroids and methotrexate  [place] [facility] [place]      30 minutes were spent face to face with the patient and her  family to discuss the disease, natural history, treatment options and survival statistics. I have provided the patient with an opportunity to ask questions and have all questions answered to her satisfaction.       she will return to the clinic in 4 weeks, with planned labs in [place] in 2 weeks, but knows to call in the interim if symptoms  change or should a problem arise.        Laquita Troy MD  Hematology and Medical Oncology  Bone Marrow Transplant  [facility]

## 2019-06-28 DIAGNOSIS — M19.90 ARTHRITIS: ICD-10-CM

## 2019-06-28 RX ORDER — PREDNISONE 10 MG/1
TABLET ORAL
Qty: 30 TABLET | Refills: 10 | Status: SHIPPED | OUTPATIENT
Start: 2019-06-28 | End: 2020-12-21

## 2019-06-28 NOTE — TELEPHONE ENCOUNTER
----- Message from Nupur Toure sent at 6/28/2019  2:06 PM CDT -----  Contact: Pt  Jesi  Pt  Jesi called to speak with nurse have some questions   Callback#[phone]  Thank You  VEDA Toure

## 2019-06-30 NOTE — ASSESSMENT & PLAN NOTE
- complete olanzapine daily x 6 days  - prn Zofran and Ativan  - improved with scheduled Compazine IV q6hr  - patient states emesis is after taking pills on an empty stomach   Universal Safety Interventions

## 2019-07-08 ENCOUNTER — PATIENT MESSAGE (OUTPATIENT)
Dept: HEMATOLOGY/ONCOLOGY | Facility: CLINIC | Age: 69
End: 2019-07-08

## 2019-07-25 NOTE — PROGRESS NOTES
Hematology and Medical Oncology   Follow Up Note     07/26/2019    Primary Oncologic Diagnosis: AML, FLT 3 + and NPMN1+.   History of Present Ilness:   Sabrina Badillo) is a pleasant 69 y.o.female presents to clinic following 2 induction therapies for AML. She was in the hospital roughly 50 days to receive 7+3 and then FLAG JENNIFER.    Oncology History:   68 y.o. female admitted overnight for possible new AML. Came in from OSH with elevated WBC of 100.   05/25/2018: Pt is now on hydrea 2 grams BID, allopurinol 300 mg daily, and IVF. Pt may need rasburicase this weekend. She will undergo a BM bx and aspiration today. She is an induction candidate and will not be screened for research trials. She has anxiety for which she usually takes xanax, this was re-started.   05/26/2018 PICC placed. Reports she slept well last night. Anxiety improved with prn xanax. EF 65%, HIV and Hep panel negative. 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  6/12/2018: Day 15 of 7+3 induction, restaging BM bx done yesterday. On Midostaurin. Fever yesterday and BC with gram + cocci in clusters. On cefepime day 2 and will start Vanc today. Labial mucositis improving.   6/13/2018: Day 16 7+3. BC with staph aureus, identification pending. Surveillance blood cultures done today. Afebrile x 48 hours. On cefepime and Vanc. Labial mucositis resolved. No complaints of pain. Nausea controlled. No diarrhea. Primary complaint is fatigue.   6/14/20018: Day 17 of 7+3. Day 14 bone marrow results pending. BC with staph epidermis only sensitive to Vancomycin. Vanc day 3 and cefepime day 4. Vanc trough 12.2 last night. BP remains low but stable. Afebrile x 72 hours.   6/15/2018: Day 18 of 7+3. Day 14 bone marrow biopsy shows persistent disease with 15% blasts. Discussion with patient regarding treatment and she is deciding if she wants to proceed with re-induction vs outpatient maintenance. Temp of 100.4 overnight, unsustained. Day 5 Cefepime and Day 4 of  Vanc for staph epidermis. Repeat cultures NGTD. BP improved. Electrolytes (mag, phos, K and calcium) replaced aggressively this am and will repeat labs this afternoon. No complaints this am.   06/16/2018: Day 19 of 7+3. Day 2 of Flag-JENNIFER. Remains afebrile. Calcium remains low and have increased PO supplementation. Remains on vanc and aztreonam. Somewhat loopy feeling after receiving benadryl.   06/17/2018: Day 20 of 7+3. Day 3 of FLAG-JENNIFER. Multiple electrolyte abnormalities. New bilateral leg and feet swelling.   6/18/2018: Day 21 of 7+3 and Day 4 of FLAG JENNIFER. Tolerating well with some nausea controlled with Zofran. VSS, afebrile. ANC 1900 on Neupogen. Continues Vanc for staph epi bacteremia. Multiple electrolytes replaced today. Complains of edema to lower extremities, not improved after 20 of lasix yesterday. No sob or CP.   6/19/18: Day 22 of 7+3 and Day 5 of FLAG JENNIFER. VSS, afebrile, ANC 3900. Vanc trough elevated and dose adjusted this am. Lower extremity edema improved after 40 of lasix yesterday, net negative 300 cc I&O. Mild nausea, no abdominal pain or diarrhea. Poor appetite but no difficulty eating or taking pills.   6/20/18: Day 23 of 7+3 and Day 6 of FLAG JENNIFER. Patient complains of nausea and vomiting overnight and this am, especially when taking pills. Will add Zyprexa daily in addition to Zofran, compazine, and ativan PRN and will change meds to IV. 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 Afebrile.  No diarrhea, mouth sores or pain.  06/21/2018: Day 24 of 7+3 and Day 7 of FLAG JENNIFER. Nausea better controlled. Continued on Vanc.  6/22/2018: Day 25 of 7+3 and Day 8 of FLAG JENNIFER. Nausea improved some with Zyprexa but did have 1 episode of emesis overnight.continuing meds IV due to vomiting. Remains afebrile. ANC 0. Continues Vanc and Flagyl. Electrolytes replaced.   06/23/2018 : day 26 of 7+3 and Day 9 of FLAG JENNIFER.  Nausea persists,  worsened after taking pills so meds converted to IV.  Encouraged ambulation.  Continue with flagyl for leptotrichia goodfellowii.  RUQ US showed intrahepatic dilation, overall impression hepatic steatosis.  CBD 0.5cm.  No Gallbladder.    06/24/2018 : day 27 of 7+3 and Day 10 of FLAG JENNIFER.  Nausea persisting, able to tolerate some po pills.  Last day of flagyl (day 5).  Still pancytopenic. Appetite still low as po intake triggers nausea.  06/25/2018: day 28 of 7+3 and Day 11 of FLAG JENNIFER. Afebrile, ANC 0. Has completed Flagyl. Will decrease Vanc to 1000 mg q12, trough 19.9, will complete Vanc on 6/27. Liver enzymes stable on Midostaurin. VSS.   6/26/2018: day 29 of 7+3 and day 12 of FLAG JENNIFER. Liver enzymes improved and Midostaurin increased to full dose 50 mg bid. Nausea controlled, afebrile, VSS, NEON.  6/27/2018: day 30 of 7+3 and Day 13 of FLAG JENNIFER. Persistent nausea and emesis x 1 yesterday. Compazine changed to scheduled. Vanc ends today. Afebrile, VSS. Aggressive electrolyte replacement, low electrolytes possibly due to Midostaurin.   6/28/2018: day 31 of 7+3 and Day 14 of FLAG JENNIFER. Nausea improved with scheduled Compazine. Patient has agreed to start converting some IV meds to po. VSS, afebrile, electrolytes wnl today. Only complains of fatigue, new rash noted to upper back and chest and legs, papular rash mildly red.  6/29/2018: Day 32 of 7+3 and Day 15 of FLAG JENNIFER. Day 14 restaging bone marrow biopsy done at bedside today. Patient states nausea improved but she did have emesis last night after taking 9 pm pills. Rash improved. No mouth sores, diarrhea or pain.   7/2/2018: Day 35 of 7+3 and Day 18 of FLAG JENNIFER. Restaging BM bx results pending. Fluid overload over the weekend. Chest x-ray with pulmonary edema. Os sats down to 88%. Placed on O2 at 2 L NC, off O2 this am. Received lasix. Net negative 2.7 L today. 1 episode of nausea, no emesis. Reports anxiety relieved with PRN meds. Electrolytes improved,  afebrile, VSS.   7/3/2018: Day 36 of 7+3 and Day 19 of FLAG JENNIFER. Restaging Bm bx with GABRIEL. Cardiology consulted for abnormal echo, EF 30% with pulmonary HTN and severe L atrial enlargement. EKG with T wave abnormality, possible anterolateral ischemia and prolonged QTc of 530. Medications adjusted. Nausea controlled, no diarrhea. Electrolytes improved. On O2 as needed.   07/04/2018 : Day 37 of 7+3 and Day 20 of FLAG JENNIFER Diarrhea in AM, watery, no associated abdominal pain, nausea, or vomiting.    07/05/2018: Day 38 of 7+3 ane Day 21 of FLAG JENNIFER. No CP or SOB, cardiology following. On RA. All meds changed to po, denies nausea or vomiting and no diarrhea. VSS, afebrile.   7/6/2018: Day 22 of FLAG JENNIFER. Continues to tolerate po meds with no nausea. Afebrile. Awaiting count recovery for discharge.  7/7/2018-/8/2018: Day 40/41 of 7+3, Day 23/24 of FLAG JENNIFER.  Improving clinically.  Started on mag oxide per patient request.  Labs showing signs of ANC recovery.  7/9/2018: Day 25 FLAG JENNIFER, , continues Neupogen. Received platelets today. Afebrile, VSS. Tolerating all oral meds with no nausea or vomiting. Only complains of fatigue.   7/10/2018: Day 26 FLAG JENNIFER,  today. Had 2 episodes of vomiting and diarrhea last night. Feeling better. Afebrile, VSS.   7/11/2018: Day 27 FLAG JENNIFER,  today. No vomiting or diarrhea overnight and no nausea. Afebrile, VSS. Complains of back pain (bone pain) suspect due to count recovery. Relieved with oxy IR and Zyrtec started.  07/12/2018: Day 28 flag jennifer, engrafted today with ANC of 730. Back pain improved with zyrtec. Scheduled for IT chemo tomorrow at 1:30 pm and discharge home thereafter. Will likely need plt transfusion prior to IT chemo tomorrow   7/13/2018: Day 29 of FLAG JENNIFER. ANC up to 1300 today. Transfusing platelets today prior to LP for IT chemo. Patient continues to complain of bone pain, mostly to back and legs. No nausea, diarrhea, sob. Afebrile and VSS.    --hospitalized 7/23-7/25 for C.diff diarrhea, neutropenic fever.  While inpatient she developed pink blisters on her palm that were concerning for relapse.  --palm biopsy on 7/26/18 for suspicion of leukemia cutis was also negative for sign of relapse  --bone marrow biopsy 7/31/18 was negative for signs of relapse   --Admitted on 8/14/18 for HiDAC#1  --Admitted on 9/11/18 for HiDAC#2  --Admitted on 10/16/18 for HiDAC#3  --Post consolidation bone marrow biopsy on 12/27/2018: AML FISH (BM) Interpretation: The result is within normal limits for the AML FISH panel. No evidence of residual disease.  --Bone Marrow Biopsy on 5/9/19 NO MORPHOLOGIC EVIDENCE OF RESIDUAL ACUTE MYELOID LEUKEMIA. RECOMMEND NGS STUDY TO RULE OUT RESIDUAL DISEASE. deletion 20q    Interval History:   Ms. Wilkes has been doing quite well. Her energy has returned. She is here today with her sister. She occassionally has a day with decreased energy, but over all has been feeling quite good with no complaints.      Past Medical History:   Past Medical History:   Diagnosis Date    Cancer 03/2018    AML    CHF (congestive heart failure)     Diarrhea 9/14/2018    Encounter for blood transfusion        Current Medications:   Current Outpatient Medications   Medication Sig    acyclovir (ZOVIRAX) 200 MG capsule Take 2 capsules (400 mg total) by mouth 2 (two) times daily.    ALPRAZolam (XANAX) 0.25 MG tablet Take 1 tablet (0.25 mg total) by mouth 3 (three) times daily as needed for Anxiety.    ciprofloxacin HCl (CIPRO) 500 MG tablet Take 1 tablet (500 mg total) by mouth every 12 (twelve) hours.    fluconazole (DIFLUCAN) 200 MG Tab Take 2 tablets (400 mg total) by mouth once daily.    LORazepam (ATIVAN) 0.5 MG tablet Take 1 tablet (0.5 mg total) by mouth every 6 (six) hours as needed (nausea).    magnesium oxide (MAG-OX) 400 mg tablet Take 2 tablets (800 mg total) by mouth 2 (two) times daily.    metoprolol succinate (TOPROL XL) 25 MG 24 hr  tablet Take 1 tablet (25 mg total) by mouth once daily.    metoprolol succinate (TOPROL-XL) 25 MG 24 hr tablet Take 0.5 tablets (12.5 mg total) by mouth once daily.    midostaurin (RYDAPT) 25 mg Cap Take 50 mg by mouth 2 (two) times daily on days 8 to 21 of cycle    nystatin (DUKE'S SOLUTION) Take 10 mLs by mouth 4 (four) times daily.    omeprazole (PRILOSEC OTC) 20 MG tablet Take 20 mg by mouth every morning.    ondansetron (ZOFRAN) 8 MG tablet Take 1 tablet (8 mg total) by mouth every 12 (twelve) hours as needed for Nausea.    oxyCODONE-acetaminophen (PERCOCET) 7.5-325 mg per tablet Take 1 tablet by mouth every 4 (four) hours as needed for Pain.    predniSONE (DELTASONE) 10 MG tablet TAKE ONE TABLET BY MOUTH ONCE DAILY AS NEEDED FOR ARTHRITIS FLARE    prochlorperazine (COMPAZINE) 10 MG tablet Take 1 tablet (10 mg total) by mouth 4 (four) times daily.    vitamin D 1000 units Tab Take 1 tablet (1,000 Units total) by mouth once daily.    walker (ULTRA-LIGHT ROLLATOR) Misc 1 Units by Misc.(Non-Drug; Combo Route) route once daily.     No current facility-administered medications for this visit.      ALLERGIES:   Review of patient's allergies indicates:   Allergen Reactions    Methotrexate analogues      Elevated Liver Enzyme    Bactrim [sulfamethoxazole-trimethoprim] Nausea And Vomiting and Rash       Review of Systems:     Review of Systems   Constitutional: Positive for fatigue. Negative for appetite change, chills, diaphoresis, fever and unexpected weight change.   HENT:   Negative for hearing loss, mouth sores, nosebleeds, sore throat, trouble swallowing and voice change.    Eyes: Negative for eye problems and icterus.   Respiratory: Negative for chest tightness, cough, hemoptysis, shortness of breath and wheezing.    Cardiovascular: Negative for chest pain, leg swelling and palpitations.   Gastrointestinal: Negative for abdominal distention, abdominal pain, blood in stool, diarrhea, nausea and  vomiting.   Endocrine: Negative for hot flashes.   Genitourinary: Negative for bladder incontinence, difficulty urinating, dysuria and hematuria.    Musculoskeletal: Positive for myalgias. Negative for arthralgias, back pain, flank pain, gait problem, neck pain and neck stiffness.   Skin: Negative for itching, rash and wound.   Neurological: Negative for dizziness, extremity weakness, gait problem, headaches, numbness, seizures and speech difficulty.   Hematological: Negative for adenopathy. Does not bruise/bleed easily.   Psychiatric/Behavioral: Positive for sleep disturbance. Negative for confusion and depression. The patient is not nervous/anxious.         Physical Exam:     Vitals:    07/26/19 1117   BP: (!) 144/77   Pulse: 72   Resp: 16   Temp: 98.2 °F (36.8 °C)       Physical Exam   Constitutional: She is oriented to person, place, and time. She appears well-developed and well-nourished. No distress.   HENT:   Head: Normocephalic and atraumatic.   Mouth/Throat: Oropharynx is clear and moist. No oropharyngeal exudate.   Hair has regrown     Eyes: Pupils are equal, round, and reactive to light. Conjunctivae and EOM are normal.   Neck: Normal range of motion. Neck supple. No JVD present. No tracheal deviation present. No thyromegaly present.   Cardiovascular: Normal rate, regular rhythm and normal heart sounds. Exam reveals no friction rub.   No murmur heard.  Pulmonary/Chest: Effort normal and breath sounds normal. No stridor. No respiratory distress. She has no wheezes. She has no rales. She exhibits no tenderness.   Abdominal: Soft. Bowel sounds are normal. She exhibits no distension. There is no tenderness. There is no rebound and no guarding.   Musculoskeletal: Normal range of motion. She exhibits no edema, tenderness or deformity.   Lymphadenopathy:     She has no axillary adenopathy.   Neurological: She is alert and oriented to person, place, and time. She displays normal reflexes. No cranial nerve deficit  or sensory deficit. She exhibits normal muscle tone. Coordination normal.   Skin: Skin is warm and dry. Capillary refill takes less than 2 seconds. No rash noted. She is not diaphoretic. No erythema. No pallor.   Palmar rash   Psychiatric: She has a normal mood and affect. Her behavior is normal. Judgment and thought content normal.       ECOG Performance Status: (foot note - ECOG PS provided by Eastern Cooperative Oncology Group) 1 - Symptomatic but completely ambulatory    Karnofsky Performance Score:  80%- Normal Activity with Effort: Some Symptoms of Disease    Labs:   Lab Results   Component Value Date    WBC 4.07 07/26/2019    HGB 11.0 (L) 07/26/2019    HCT 34.6 (L) 07/26/2019    PLT 84 (L) 07/26/2019    ALT 12 07/26/2019    AST 18 07/26/2019     07/26/2019    K 4.2 07/26/2019     07/26/2019    CREATININE 0.9 07/26/2019    BUN 14 07/26/2019    CO2 26 07/26/2019    INR 1.1 05/31/2018       Imaging: previous imaging has been reviewed    Assessment and Plan:     Mrs. Mac is a pleasant 69 year old female with recently diagnosed AML.      Acute Monocytic Leukemia  -- Admitted from [facility] on 5/25/18 early AM with WBC around 100s, for suspected new non-M3 AML  --lo res HLA typing on 5/26/18 and hi res on 5/27/18 done in anticipation of possible need for future transplant   --Pt with two daughters, two full sisters (in their mid 60s, one with brain aneurysm hx, other one without any medical issues), and one full brother (61 y/o healthy).  --NPM1 +, CEBPA (-), FLT3 (+).  On Midostaurin 50 mg PO BID until Day 14 (held starting 6/8 to 6/11). Stopped when FLAG JENNIFER re-induction started. Restarted Midostaurin at 25 mg bid on day 8 of FLAG JENNIFER induction (6/22), increase to full dose 50 mg bid (6/26) as improvement in liver enzymes. Stopped 7/3/18 due to abnormal cardiac echo.  --day 14 bone marrow biopsy completed 6/11 showing persistent disease with 15% CD 34 positive blasts  --Day 60 of FLAG-JENNIFER,  tolerating without difficulty except nausea/vomiting  --day 14 restaging bone marrow biopsy done 6/29 without complication, results with no evidence of AML, FLT 3 negative, will need repeat marrow at count recovery outpatient   --recovery marrow showed no evidence of disease  --HiDAC #1 on 8/14/18, HiDAC#2 on 9/11/18  --Cycle 3 delayed 1 week due to pancytopenia. Started on 10/16/18  --recovery bone marrow showed no signs of residual leukemia  --Bone marrow biopsy done in May shows no overt signs of recurrent disease    Thrombocytopenia  --Previously in the 70-90 range  --84 today    Suspected Leukemia Cutis  --pathology from dermatology confirmed inflammatory changes  --now resolved with topical steroid cream    Abnormal Liver Function Tests  --ALT and AST and Alk phos have again increased substantially since re-starting midostaurin.   --midostaurin started day 8 at 25 mg bid, monitoring liver enzymes closely and improved. Increasing Midostaurin to 50 mg bid 6/26. Stopped Midostaurin (7/3) due to new diagnosis of systolic heart failure EF 35%.  --6/22/18 liver US showing mild intrahepatic dilation, otherwise impression of hepatic steatosis.  Will hold off of MRCP at this time.    --permanently discontinued midostaurin     Chemotherapy induced cardiomyopathy  --cardiology follow-up as outpatient in 3 months  --repeat 2D echo on 6/15 with preserved EF of 60%.  However 7/2/18 echo with reduced EF 30-35%  --Echo on 8/15/18 was within normal range EF of 60-65%    Rheumatologic issues  --Previously on steroids and methotrexate  --working with [facility]      30 minutes were spent face to face with the patient and her  family to discuss the disease, natural history, treatment options and survival statistics. I have provided the patient with an opportunity to ask questions and have all questions answered to her satisfaction.       she will return to the clinic in 4 weeks, with planned labs in [place] in 2 weeks, but knows  to call in the interim if symptoms change or should a problem arise.        Laquita Troy MD  Hematology and Medical Oncology  Bone Marrow Transplant  [facility]

## 2019-07-26 ENCOUNTER — LAB VISIT (OUTPATIENT)
Dept: LAB | Facility: HOSPITAL | Age: 69
End: 2019-07-26
Attending: INTERNAL MEDICINE
Payer: MEDICARE

## 2019-07-26 ENCOUNTER — OFFICE VISIT (OUTPATIENT)
Dept: HEMATOLOGY/ONCOLOGY | Facility: CLINIC | Age: 69
End: 2019-07-26
Payer: MEDICARE

## 2019-07-26 VITALS
DIASTOLIC BLOOD PRESSURE: 77 MMHG | HEIGHT: 63 IN | OXYGEN SATURATION: 99 % | TEMPERATURE: 98 F | HEART RATE: 72 BPM | BODY MASS INDEX: 30.31 KG/M2 | RESPIRATION RATE: 16 BRPM | WEIGHT: 171.06 LBS | SYSTOLIC BLOOD PRESSURE: 144 MMHG

## 2019-07-26 DIAGNOSIS — F41.9 ANXIETY: Primary | ICD-10-CM

## 2019-07-26 DIAGNOSIS — C93.01 ACUTE MONOCYTIC LEUKEMIA IN REMISSION: ICD-10-CM

## 2019-07-26 DIAGNOSIS — I42.7 CHEMOTHERAPY INDUCED CARDIOMYOPATHY: ICD-10-CM

## 2019-07-26 DIAGNOSIS — T45.1X5A CHEMOTHERAPY INDUCED CARDIOMYOPATHY: ICD-10-CM

## 2019-07-26 DIAGNOSIS — C92.00 ACUTE MYELOID LEUKEMIA NOT HAVING ACHIEVED REMISSION: ICD-10-CM

## 2019-07-26 DIAGNOSIS — M79.604 PAIN IN BOTH LOWER EXTREMITIES: ICD-10-CM

## 2019-07-26 DIAGNOSIS — G47.00 INSOMNIA, UNSPECIFIED TYPE: ICD-10-CM

## 2019-07-26 DIAGNOSIS — C92.00 AML (ACUTE MYELOBLASTIC LEUKEMIA): ICD-10-CM

## 2019-07-26 DIAGNOSIS — M79.605 PAIN IN BOTH LOWER EXTREMITIES: ICD-10-CM

## 2019-07-26 LAB
ALBUMIN SERPL BCP-MCNC: 3.8 G/DL (ref 3.5–5.2)
ALP SERPL-CCNC: 89 U/L (ref 55–135)
ALT SERPL W/O P-5'-P-CCNC: 12 U/L (ref 10–44)
ANION GAP SERPL CALC-SCNC: 8 MMOL/L (ref 8–16)
AST SERPL-CCNC: 18 U/L (ref 10–40)
BILIRUB SERPL-MCNC: 0.3 MG/DL (ref 0.1–1)
BUN SERPL-MCNC: 14 MG/DL (ref 8–23)
CALCIUM SERPL-MCNC: 10.1 MG/DL (ref 8.7–10.5)
CHLORIDE SERPL-SCNC: 105 MMOL/L (ref 95–110)
CO2 SERPL-SCNC: 26 MMOL/L (ref 23–29)
CREAT SERPL-MCNC: 0.9 MG/DL (ref 0.5–1.4)
ERYTHROCYTE [DISTWIDTH] IN BLOOD BY AUTOMATED COUNT: 13.6 % (ref 11.5–14.5)
EST. GFR  (AFRICAN AMERICAN): >60 ML/MIN/1.73 M^2
EST. GFR  (NON AFRICAN AMERICAN): >60 ML/MIN/1.73 M^2
GLUCOSE SERPL-MCNC: 94 MG/DL (ref 70–110)
HCT VFR BLD AUTO: 34.6 % (ref 37–48.5)
HGB BLD-MCNC: 11 G/DL (ref 12–16)
IMM GRANULOCYTES # BLD AUTO: 0.05 K/UL (ref 0–0.04)
MCH RBC QN AUTO: 32.2 PG (ref 27–31)
MCHC RBC AUTO-ENTMCNC: 31.8 G/DL (ref 32–36)
MCV RBC AUTO: 101 FL (ref 82–98)
NEUTROPHILS # BLD AUTO: 2.5 K/UL (ref 1.8–7.7)
PLATELET # BLD AUTO: 84 K/UL (ref 150–350)
PMV BLD AUTO: 9.8 FL (ref 9.2–12.9)
POTASSIUM SERPL-SCNC: 4.2 MMOL/L (ref 3.5–5.1)
PROT SERPL-MCNC: 6.8 G/DL (ref 6–8.4)
RBC # BLD AUTO: 3.42 M/UL (ref 4–5.4)
SODIUM SERPL-SCNC: 139 MMOL/L (ref 136–145)
WBC # BLD AUTO: 4.07 K/UL (ref 3.9–12.7)

## 2019-07-26 PROCEDURE — 85027 COMPLETE CBC AUTOMATED: CPT

## 2019-07-26 PROCEDURE — 99215 OFFICE O/P EST HI 40 MIN: CPT | Mod: S$PBB,,, | Performed by: INTERNAL MEDICINE

## 2019-07-26 PROCEDURE — 99999 PR PBB SHADOW E&M-EST. PATIENT-LVL IV: CPT | Mod: PBBFAC,,, | Performed by: INTERNAL MEDICINE

## 2019-07-26 PROCEDURE — 36415 COLL VENOUS BLD VENIPUNCTURE: CPT

## 2019-07-26 PROCEDURE — 99215 PR OFFICE/OUTPT VISIT, EST, LEVL V, 40-54 MIN: ICD-10-PCS | Mod: S$PBB,,, | Performed by: INTERNAL MEDICINE

## 2019-07-26 PROCEDURE — 99999 PR PBB SHADOW E&M-EST. PATIENT-LVL IV: ICD-10-PCS | Mod: PBBFAC,,, | Performed by: INTERNAL MEDICINE

## 2019-07-26 PROCEDURE — 80053 COMPREHEN METABOLIC PANEL: CPT

## 2019-07-26 PROCEDURE — 99214 OFFICE O/P EST MOD 30 MIN: CPT | Mod: PBBFAC | Performed by: INTERNAL MEDICINE

## 2019-07-26 RX ORDER — ESCITALOPRAM OXALATE 20 MG/1
20 TABLET ORAL
COMMUNITY
Start: 2019-06-28 | End: 2020-06-27

## 2019-07-26 RX ORDER — ALPRAZOLAM 0.5 MG/1
0.25 TABLET ORAL 3 TIMES DAILY PRN
Qty: 60 TABLET | Refills: 0 | Status: SHIPPED | OUTPATIENT
Start: 2019-07-26 | End: 2020-07-25

## 2019-07-26 RX ORDER — OXYCODONE AND ACETAMINOPHEN 7.5; 325 MG/1; MG/1
1 TABLET ORAL EVERY 4 HOURS PRN
Qty: 90 TABLET | Refills: 0 | Status: SHIPPED | OUTPATIENT
Start: 2019-07-26 | End: 2020-03-24 | Stop reason: SDUPTHER

## 2019-08-14 ENCOUNTER — TELEPHONE (OUTPATIENT)
Dept: HEMATOLOGY/ONCOLOGY | Facility: CLINIC | Age: 69
End: 2019-08-14

## 2019-08-14 NOTE — TELEPHONE ENCOUNTER
Called pt and spoke with . Said that we would try to locate the lab work and will call tomorrow to review lab results

## 2019-08-14 NOTE — TELEPHONE ENCOUNTER
----- Message from Dilcia Velazquez sent at 8/14/2019  1:59 PM CDT -----  Contact: Jesi jordan)  Name of Test (Lab/Mammo/Etc): labs  Date of Test: N/a  Ordering Provider: Dr Troy  Where the test was performed: lab in [place]   Best Call Back Number: [phone]  Additional Information: Wants to confirm the results were received and would like to discuss.

## 2019-08-15 DIAGNOSIS — C92.00 ACUTE MYELOID LEUKEMIA NOT HAVING ACHIEVED REMISSION: Primary | ICD-10-CM

## 2019-08-21 ENCOUNTER — TELEPHONE (OUTPATIENT)
Dept: HEMATOLOGY/ONCOLOGY | Facility: CLINIC | Age: 69
End: 2019-08-21

## 2019-08-21 NOTE — TELEPHONE ENCOUNTER
----- Message from Terrie Lazar sent at 8/21/2019  2:34 PM CDT -----  Contact:   Needs Advice    Reason for call: Patient came down with a virus and  is concerned about bringing her to her appt on tomorrow. And want to reschedule appt till next week any day but Friday        Communication Preference: [phone]    Additional Information:

## 2019-08-27 ENCOUNTER — LAB VISIT (OUTPATIENT)
Dept: LAB | Facility: HOSPITAL | Age: 69
End: 2019-08-27
Attending: INTERNAL MEDICINE
Payer: MEDICARE

## 2019-08-27 ENCOUNTER — OFFICE VISIT (OUTPATIENT)
Dept: HEMATOLOGY/ONCOLOGY | Facility: CLINIC | Age: 69
End: 2019-08-27
Payer: MEDICARE

## 2019-08-27 VITALS
BODY MASS INDEX: 30.79 KG/M2 | SYSTOLIC BLOOD PRESSURE: 111 MMHG | OXYGEN SATURATION: 96 % | HEART RATE: 75 BPM | HEIGHT: 63 IN | WEIGHT: 173.75 LBS | RESPIRATION RATE: 16 BRPM | TEMPERATURE: 99 F | DIASTOLIC BLOOD PRESSURE: 55 MMHG

## 2019-08-27 DIAGNOSIS — R79.89 ELEVATED LFTS: ICD-10-CM

## 2019-08-27 DIAGNOSIS — G89.29 OTHER CHRONIC PAIN: ICD-10-CM

## 2019-08-27 DIAGNOSIS — C92.00 AML (ACUTE MYELOBLASTIC LEUKEMIA): ICD-10-CM

## 2019-08-27 DIAGNOSIS — C92.00 ACUTE MYELOID LEUKEMIA NOT HAVING ACHIEVED REMISSION: Primary | ICD-10-CM

## 2019-08-27 DIAGNOSIS — C92.00 ACUTE MYELOID LEUKEMIA NOT HAVING ACHIEVED REMISSION: ICD-10-CM

## 2019-08-27 DIAGNOSIS — Z86.79 HISTORY OF VENTRICULAR FIBRILLATION: ICD-10-CM

## 2019-08-27 LAB
ALBUMIN SERPL BCP-MCNC: 3.7 G/DL (ref 3.5–5.2)
ALP SERPL-CCNC: 168 U/L (ref 55–135)
ALT SERPL W/O P-5'-P-CCNC: 40 U/L (ref 10–44)
ANION GAP SERPL CALC-SCNC: 9 MMOL/L (ref 8–16)
AST SERPL-CCNC: 32 U/L (ref 10–40)
BILIRUB SERPL-MCNC: 0.3 MG/DL (ref 0.1–1)
BUN SERPL-MCNC: 16 MG/DL (ref 8–23)
CALCIUM SERPL-MCNC: 9.6 MG/DL (ref 8.7–10.5)
CHLORIDE SERPL-SCNC: 106 MMOL/L (ref 95–110)
CO2 SERPL-SCNC: 25 MMOL/L (ref 23–29)
CREAT SERPL-MCNC: 1 MG/DL (ref 0.5–1.4)
ERYTHROCYTE [DISTWIDTH] IN BLOOD BY AUTOMATED COUNT: 13.3 % (ref 11.5–14.5)
EST. GFR  (AFRICAN AMERICAN): >60 ML/MIN/1.73 M^2
EST. GFR  (NON AFRICAN AMERICAN): 58 ML/MIN/1.73 M^2
GLUCOSE SERPL-MCNC: 121 MG/DL (ref 70–110)
HCT VFR BLD AUTO: 34.5 % (ref 37–48.5)
HGB BLD-MCNC: 10.4 G/DL (ref 12–16)
IMM GRANULOCYTES # BLD AUTO: 0.12 K/UL (ref 0–0.04)
MAGNESIUM SERPL-MCNC: 1.4 MG/DL (ref 1.6–2.6)
MCH RBC QN AUTO: 31 PG (ref 27–31)
MCHC RBC AUTO-ENTMCNC: 30.1 G/DL (ref 32–36)
MCV RBC AUTO: 103 FL (ref 82–98)
NEUTROPHILS # BLD AUTO: 4.9 K/UL (ref 1.8–7.7)
PHOSPHATE SERPL-MCNC: 3.2 MG/DL (ref 2.7–4.5)
PLATELET # BLD AUTO: 107 K/UL (ref 150–350)
PMV BLD AUTO: 10.3 FL (ref 9.2–12.9)
POTASSIUM SERPL-SCNC: 3.9 MMOL/L (ref 3.5–5.1)
PROT SERPL-MCNC: 7 G/DL (ref 6–8.4)
RBC # BLD AUTO: 3.36 M/UL (ref 4–5.4)
SODIUM SERPL-SCNC: 140 MMOL/L (ref 136–145)
WBC # BLD AUTO: 6.08 K/UL (ref 3.9–12.7)

## 2019-08-27 PROCEDURE — 99999 PR PBB SHADOW E&M-EST. PATIENT-LVL IV: ICD-10-PCS | Mod: PBBFAC,,, | Performed by: INTERNAL MEDICINE

## 2019-08-27 PROCEDURE — 80053 COMPREHEN METABOLIC PANEL: CPT

## 2019-08-27 PROCEDURE — 83735 ASSAY OF MAGNESIUM: CPT

## 2019-08-27 PROCEDURE — 99999 PR PBB SHADOW E&M-EST. PATIENT-LVL IV: CPT | Mod: PBBFAC,,, | Performed by: INTERNAL MEDICINE

## 2019-08-27 PROCEDURE — 99214 OFFICE O/P EST MOD 30 MIN: CPT | Mod: PBBFAC | Performed by: INTERNAL MEDICINE

## 2019-08-27 PROCEDURE — 36415 COLL VENOUS BLD VENIPUNCTURE: CPT

## 2019-08-27 PROCEDURE — 99215 OFFICE O/P EST HI 40 MIN: CPT | Mod: S$PBB,,, | Performed by: INTERNAL MEDICINE

## 2019-08-27 PROCEDURE — 99215 PR OFFICE/OUTPT VISIT, EST, LEVL V, 40-54 MIN: ICD-10-PCS | Mod: S$PBB,,, | Performed by: INTERNAL MEDICINE

## 2019-08-27 PROCEDURE — 84100 ASSAY OF PHOSPHORUS: CPT

## 2019-08-27 PROCEDURE — 85027 COMPLETE CBC AUTOMATED: CPT

## 2019-08-27 NOTE — PROGRESS NOTES
Hematology and Medical Oncology   Follow Up Note     08/27/2019    Primary Oncologic Diagnosis: AML, FLT 3 + and NPMN1+.   History of Present Ilness:   Sabrina Badillo) is a pleasant 69 y.o.female presents to clinic following 2 induction therapies for AML. She was in the hospital roughly 50 days to receive 7+3 and then FLAG JENNIFER.    Oncology History:   68 y.o. female admitted overnight for possible new AML. Came in from OSH with elevated WBC of 100.   05/25/2018: Pt is now on hydrea 2 grams BID, allopurinol 300 mg daily, and IVF. Pt may need rasburicase this weekend. She will undergo a BM bx and aspiration today. She is an induction candidate and will not be screened for research trials. She has anxiety for which she usually takes xanax, this was re-started.   05/26/2018 PICC placed. Reports she slept well last night. Anxiety improved with prn xanax. EF 65%, HIV and Hep panel negative. 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  6/12/2018: Day 15 of 7+3 induction, restaging BM bx done yesterday. On Midostaurin. Fever yesterday and BC with gram + cocci in clusters. On cefepime day 2 and will start Vanc today. Labial mucositis improving.   6/13/2018: Day 16 7+3. BC with staph aureus, identification pending. Surveillance blood cultures done today. Afebrile x 48 hours. On cefepime and Vanc. Labial mucositis resolved. No complaints of pain. Nausea controlled. No diarrhea. Primary complaint is fatigue.   6/14/20018: Day 17 of 7+3. Day 14 bone marrow results pending. BC with staph epidermis only sensitive to Vancomycin. Vanc day 3 and cefepime day 4. Vanc trough 12.2 last night. BP remains low but stable. Afebrile x 72 hours.   6/15/2018: Day 18 of 7+3. Day 14 bone marrow biopsy shows persistent disease with 15% blasts. Discussion with patient regarding treatment and she is deciding if she wants to proceed with re-induction vs outpatient maintenance. Temp of 100.4 overnight, unsustained. Day 5 Cefepime and Day 4 of  Vanc for staph epidermis. Repeat cultures NGTD. BP improved. Electrolytes (mag, phos, K and calcium) replaced aggressively this am and will repeat labs this afternoon. No complaints this am.   06/16/2018: Day 19 of 7+3. Day 2 of Flag-JENNIFER. Remains afebrile. Calcium remains low and have increased PO supplementation. Remains on vanc and aztreonam. Somewhat loopy feeling after receiving benadryl.   06/17/2018: Day 20 of 7+3. Day 3 of FLAG-JENNIFER. Multiple electrolyte abnormalities. New bilateral leg and feet swelling.   6/18/2018: Day 21 of 7+3 and Day 4 of FLAG JENNIFER. Tolerating well with some nausea controlled with Zofran. VSS, afebrile. ANC 1900 on Neupogen. Continues Vanc for staph epi bacteremia. Multiple electrolytes replaced today. Complains of edema to lower extremities, not improved after 20 of lasix yesterday. No sob or CP.   6/19/18: Day 22 of 7+3 and Day 5 of FLAG JENNIFER. VSS, afebrile, ANC 3900. Vanc trough elevated and dose adjusted this am. Lower extremity edema improved after 40 of lasix yesterday, net negative 300 cc I&O. Mild nausea, no abdominal pain or diarrhea. Poor appetite but no difficulty eating or taking pills.   6/20/18: Day 23 of 7+3 and Day 6 of FLAG JENNIFER. Patient complains of nausea and vomiting overnight and this am, especially when taking pills. Will add Zyprexa daily in addition to Zofran, compazine, and ativan PRN and will change meds to IV. 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 Afebrile.  No diarrhea, mouth sores or pain.  06/21/2018: Day 24 of 7+3 and Day 7 of FLAG JENNIFER. Nausea better controlled. Continued on Vanc.  6/22/2018: Day 25 of 7+3 and Day 8 of FLAG JENNIFER. Nausea improved some with Zyprexa but did have 1 episode of emesis overnight.continuing meds IV due to vomiting. Remains afebrile. ANC 0. Continues Vanc and Flagyl. Electrolytes replaced.   06/23/2018 : day 26 of 7+3 and Day 9 of FLAG JENNIFER.  Nausea persists,  worsened after taking pills so meds converted to IV.  Encouraged ambulation.  Continue with flagyl for leptotrichia goodfellowii.  RUQ US showed intrahepatic dilation, overall impression hepatic steatosis.  CBD 0.5cm.  No Gallbladder.    06/24/2018 : day 27 of 7+3 and Day 10 of FLAG JENNIFER.  Nausea persisting, able to tolerate some po pills.  Last day of flagyl (day 5).  Still pancytopenic. Appetite still low as po intake triggers nausea.  06/25/2018: day 28 of 7+3 and Day 11 of FLAG JENNIFER. Afebrile, ANC 0. Has completed Flagyl. Will decrease Vanc to 1000 mg q12, trough 19.9, will complete Vanc on 6/27. Liver enzymes stable on Midostaurin. VSS.   6/26/2018: day 29 of 7+3 and day 12 of FLAG JENNIFER. Liver enzymes improved and Midostaurin increased to full dose 50 mg bid. Nausea controlled, afebrile, VSS, NEON.  6/27/2018: day 30 of 7+3 and Day 13 of FLAG JENNIFER. Persistent nausea and emesis x 1 yesterday. Compazine changed to scheduled. Vanc ends today. Afebrile, VSS. Aggressive electrolyte replacement, low electrolytes possibly due to Midostaurin.   6/28/2018: day 31 of 7+3 and Day 14 of FLAG JENNIFER. Nausea improved with scheduled Compazine. Patient has agreed to start converting some IV meds to po. VSS, afebrile, electrolytes wnl today. Only complains of fatigue, new rash noted to upper back and chest and legs, papular rash mildly red.  6/29/2018: Day 32 of 7+3 and Day 15 of FLAG JENNIFER. Day 14 restaging bone marrow biopsy done at bedside today. Patient states nausea improved but she did have emesis last night after taking 9 pm pills. Rash improved. No mouth sores, diarrhea or pain.   7/2/2018: Day 35 of 7+3 and Day 18 of FLAG JENNIFER. Restaging BM bx results pending. Fluid overload over the weekend. Chest x-ray with pulmonary edema. Os sats down to 88%. Placed on O2 at 2 L NC, off O2 this am. Received lasix. Net negative 2.7 L today. 1 episode of nausea, no emesis. Reports anxiety relieved with PRN meds. Electrolytes improved,  afebrile, VSS.   7/3/2018: Day 36 of 7+3 and Day 19 of FLAG JENNIFER. Restaging Bm bx with GABRIEL. Cardiology consulted for abnormal echo, EF 30% with pulmonary HTN and severe L atrial enlargement. EKG with T wave abnormality, possible anterolateral ischemia and prolonged QTc of 530. Medications adjusted. Nausea controlled, no diarrhea. Electrolytes improved. On O2 as needed.   07/04/2018 : Day 37 of 7+3 and Day 20 of FLAG JENNIFER Diarrhea in AM, watery, no associated abdominal pain, nausea, or vomiting.    07/05/2018: Day 38 of 7+3 ane Day 21 of FLAG JENNIFER. No CP or SOB, cardiology following. On RA. All meds changed to po, denies nausea or vomiting and no diarrhea. VSS, afebrile.   7/6/2018: Day 22 of FLAG JENNIFER. Continues to tolerate po meds with no nausea. Afebrile. Awaiting count recovery for discharge.  7/7/2018-/8/2018: Day 40/41 of 7+3, Day 23/24 of FLAG JENNIFER.  Improving clinically.  Started on mag oxide per patient request.  Labs showing signs of ANC recovery.  7/9/2018: Day 25 FLAG JENNIFER, , continues Neupogen. Received platelets today. Afebrile, VSS. Tolerating all oral meds with no nausea or vomiting. Only complains of fatigue.   7/10/2018: Day 26 FLAG JENNIFER,  today. Had 2 episodes of vomiting and diarrhea last night. Feeling better. Afebrile, VSS.   7/11/2018: Day 27 FLAG JENNIFER,  today. No vomiting or diarrhea overnight and no nausea. Afebrile, VSS. Complains of back pain (bone pain) suspect due to count recovery. Relieved with oxy IR and Zyrtec started.  07/12/2018: Day 28 flag jennifer, engrafted today with ANC of 730. Back pain improved with zyrtec. Scheduled for IT chemo tomorrow at 1:30 pm and discharge home thereafter. Will likely need plt transfusion prior to IT chemo tomorrow   7/13/2018: Day 29 of FLAG JENNIFER. ANC up to 1300 today. Transfusing platelets today prior to LP for IT chemo. Patient continues to complain of bone pain, mostly to back and legs. No nausea, diarrhea, sob. Afebrile and VSS.    --hospitalized 7/23-7/25 for C.diff diarrhea, neutropenic fever.  While inpatient she developed pink blisters on her palm that were concerning for relapse.  --palm biopsy on 7/26/18 for suspicion of leukemia cutis was also negative for sign of relapse  --bone marrow biopsy 7/31/18 was negative for signs of relapse   --Admitted on 8/14/18 for HiDAC#1  --Admitted on 9/11/18 for HiDAC#2  --Admitted on 10/16/18 for HiDAC#3  --Post consolidation bone marrow biopsy on 12/27/2018: AML FISH (BM) Interpretation: The result is within normal limits for the AML FISH panel. No evidence of residual disease.  --Bone Marrow Biopsy on 5/9/19 NO MORPHOLOGIC EVIDENCE OF RESIDUAL ACUTE MYELOID LEUKEMIA. RECOMMEND NGS STUDY TO RULE OUT RESIDUAL DISEASE. deletion 20q    Interval History:   Ms. Wilkes has been doing quite well. She had several surgeries last month including an oral implant adjustment, and 2 cataract surgeries. She feels as though she is slower to recover from propofol. She is pleased to hear about her blood work.       Past Medical History:   Past Medical History:   Diagnosis Date    Cancer 03/2018    AML    CHF (congestive heart failure)     Diarrhea 9/14/2018    Encounter for blood transfusion        Current Medications:   Current Outpatient Medications   Medication Sig    acyclovir (ZOVIRAX) 200 MG capsule Take 2 capsules (400 mg total) by mouth 2 (two) times daily.    ALPRAZolam (XANAX) 0.5 MG tablet Take 0.5 tablets (0.25 mg total) by mouth 3 (three) times daily as needed for Insomnia or Anxiety.    ciprofloxacin HCl (CIPRO) 500 MG tablet Take 1 tablet (500 mg total) by mouth every 12 (twelve) hours.    escitalopram oxalate (LEXAPRO) 20 MG tablet Take 20 mg by mouth.    fluconazole (DIFLUCAN) 200 MG Tab Take 2 tablets (400 mg total) by mouth once daily.    LORazepam (ATIVAN) 0.5 MG tablet Take 1 tablet (0.5 mg total) by mouth every 6 (six) hours as needed (nausea).    magnesium oxide (MAG-OX) 400 mg  tablet Take 2 tablets (800 mg total) by mouth 2 (two) times daily.    metoprolol succinate (TOPROL XL) 25 MG 24 hr tablet Take 1 tablet (25 mg total) by mouth once daily.    metoprolol succinate (TOPROL-XL) 25 MG 24 hr tablet Take 0.5 tablets (12.5 mg total) by mouth once daily.    midostaurin (RYDAPT) 25 mg Cap Take 50 mg by mouth 2 (two) times daily on days 8 to 21 of cycle    nystatin (DUKE'S SOLUTION) Take 10 mLs by mouth 4 (four) times daily.    omeprazole (PRILOSEC OTC) 20 MG tablet Take 20 mg by mouth every morning.    ondansetron (ZOFRAN) 8 MG tablet Take 1 tablet (8 mg total) by mouth every 12 (twelve) hours as needed for Nausea.    oxyCODONE-acetaminophen (PERCOCET) 7.5-325 mg per tablet Take 1 tablet by mouth every 4 (four) hours as needed for Pain.    predniSONE (DELTASONE) 10 MG tablet TAKE ONE TABLET BY MOUTH ONCE DAILY AS NEEDED FOR ARTHRITIS FLARE    vitamin D 1000 units Tab Take 1 tablet (1,000 Units total) by mouth once daily.    walker (ULTRA-LIGHT ROLLATOR) Misc 1 Units by Misc.(Non-Drug; Combo Route) route once daily.     No current facility-administered medications for this visit.      ALLERGIES:   Review of patient's allergies indicates:   Allergen Reactions    Methotrexate analogues      Elevated Liver Enzyme    Bactrim [sulfamethoxazole-trimethoprim] Nausea And Vomiting and Rash       Review of Systems:     Review of Systems   Constitutional: Positive for fatigue. Negative for appetite change, chills, diaphoresis, fever and unexpected weight change.   HENT:   Negative for hearing loss, mouth sores, nosebleeds, sore throat, trouble swallowing and voice change.    Eyes: Negative for eye problems and icterus.   Respiratory: Negative for chest tightness, cough, hemoptysis, shortness of breath and wheezing.    Cardiovascular: Negative for chest pain, leg swelling and palpitations.   Gastrointestinal: Negative for abdominal distention, abdominal pain, blood in stool, diarrhea, nausea  and vomiting.   Endocrine: Negative for hot flashes.   Genitourinary: Negative for bladder incontinence, difficulty urinating, dysuria and hematuria.    Musculoskeletal: Positive for myalgias. Negative for arthralgias, back pain, flank pain, gait problem, neck pain and neck stiffness.   Skin: Negative for itching, rash and wound.   Neurological: Negative for dizziness, extremity weakness, gait problem, headaches, numbness, seizures and speech difficulty.   Hematological: Negative for adenopathy. Does not bruise/bleed easily.   Psychiatric/Behavioral: Positive for sleep disturbance. Negative for confusion and depression. The patient is not nervous/anxious.         Physical Exam:     Vitals:    08/27/19 1505   BP: (!) 111/55   Pulse: 75   Resp: 16   Temp: 98.7 °F (37.1 °C)       Physical Exam   Constitutional: She is oriented to person, place, and time. She appears well-developed and well-nourished. No distress.   HENT:   Head: Normocephalic and atraumatic.   Mouth/Throat: Oropharynx is clear and moist. No oropharyngeal exudate.   Hair has regrown     Eyes: Pupils are equal, round, and reactive to light. Conjunctivae and EOM are normal.   Neck: Normal range of motion. Neck supple. No JVD present. No tracheal deviation present. No thyromegaly present.   Cardiovascular: Normal rate, regular rhythm and normal heart sounds. Exam reveals no friction rub.   No murmur heard.  Pulmonary/Chest: Effort normal and breath sounds normal. No stridor. No respiratory distress. She has no wheezes. She has no rales. She exhibits no tenderness.   Abdominal: Soft. Bowel sounds are normal. She exhibits no distension. There is no tenderness. There is no rebound and no guarding.   Musculoskeletal: Normal range of motion. She exhibits no edema, tenderness or deformity.   Lymphadenopathy:     She has no axillary adenopathy.   Neurological: She is alert and oriented to person, place, and time. She displays normal reflexes. No cranial nerve  deficit or sensory deficit. She exhibits normal muscle tone. Coordination normal.   Skin: Skin is warm and dry. Capillary refill takes less than 2 seconds. No rash noted. She is not diaphoretic. No erythema. No pallor.   Palmar rash   Psychiatric: She has a normal mood and affect. Her behavior is normal. Judgment and thought content normal.       ECOG Performance Status: (foot note - ECOG PS provided by Eastern Cooperative Oncology Group) 1 - Symptomatic but completely ambulatory    Karnofsky Performance Score:  80%- Normal Activity with Effort: Some Symptoms of Disease    Labs:   Lab Results   Component Value Date    WBC 6.08 08/27/2019    HGB 10.4 (L) 08/27/2019    HCT 34.5 (L) 08/27/2019     (L) 08/27/2019    ALT 40 08/27/2019    AST 32 08/27/2019     08/27/2019    K 3.9 08/27/2019     08/27/2019    CREATININE 1.0 08/27/2019    BUN 16 08/27/2019    CO2 25 08/27/2019    INR 1.1 05/31/2018       Imaging: previous imaging has been reviewed    Assessment and Plan:     Mrs. Mac is a pleasant 69 year old female with recently diagnosed AML.      Acute Monocytic Leukemia  -- Admitted from [facility] on 5/25/18 early AM with WBC around 100s, for suspected new non-M3 AML  --lo res HLA typing on 5/26/18 and hi res on 5/27/18 done in anticipation of possible need for future transplant   --Pt with two daughters, two full sisters (in their mid 60s, one with brain aneurysm hx, other one without any medical issues), and one full brother (61 y/o healthy).  --NPM1 +, CEBPA (-), FLT3 (+).  On Midostaurin 50 mg PO BID until Day 14 (held starting 6/8 to 6/11). Stopped when FLAG JENNIFER re-induction started. Restarted Midostaurin at 25 mg bid on day 8 of FLAG JENNIFER induction (6/22), increase to full dose 50 mg bid (6/26) as improvement in liver enzymes. Stopped 7/3/18 due to abnormal cardiac echo.  --day 14 bone marrow biopsy completed 6/11 showing persistent disease with 15% CD 34 positive blasts  --Day 60 of  FLAG-JENNIFER, tolerating without difficulty except nausea/vomiting  --day 14 restaging bone marrow biopsy done 6/29 without complication, results with no evidence of AML, FLT 3 negative, will need repeat marrow at count recovery outpatient   --recovery marrow showed no evidence of disease  --HiDAC #1 on 8/14/18, HiDAC#2 on 9/11/18  --Cycle 3 delayed 1 week due to pancytopenia. Started on 10/16/18  --recovery bone marrow showed no signs of residual leukemia  --Bone marrow biopsy done in May shows no overt signs of recurrent disease    Thrombocytopenia  --Previously in the 70-90 range  --107 today    Suspected Leukemia Cutis  --pathology from dermatology confirmed inflammatory changes  --now resolved with topical steroid cream    Abnormal Liver Function Tests  --ALT and AST and Alk phos have again increased substantially since re-starting midostaurin.   --midostaurin started day 8 at 25 mg bid, monitoring liver enzymes closely and improved. Increasing Midostaurin to 50 mg bid 6/26. Stopped Midostaurin (7/3) due to new diagnosis of systolic heart failure EF 35%.  --6/22/18 liver US showing mild intrahepatic dilation, otherwise impression of hepatic steatosis.  Will hold off of MRCP at this time.    --permanently discontinued midostaurin     Chemotherapy induced cardiomyopathy  --cardiology follow-up as outpatient in 3 months  --repeat 2D echo on 6/15 with preserved EF of 60%.  However 7/2/18 echo with reduced EF 30-35%  --Echo on 8/15/18 was within normal range EF of 60-65%    Rheumatologic issues  --Previously on steroids and methotrexate  --working with [facility] rheumatology      30 minutes were spent face to face with the patient and her  family to discuss the disease, natural history, treatment options and survival statistics. I have provided the patient with an opportunity to ask questions and have all questions answered to her satisfaction.       she will return to the clinic in 4 weeks, but knows to call in the interim  if symptoms change or should a problem arise.        Laquita Troy MD  Hematology and Medical Oncology  Bone Marrow Transplant  [facility]

## 2019-09-24 ENCOUNTER — OFFICE VISIT (OUTPATIENT)
Dept: HEMATOLOGY/ONCOLOGY | Facility: CLINIC | Age: 69
End: 2019-09-24
Payer: MEDICARE

## 2019-09-24 ENCOUNTER — LAB VISIT (OUTPATIENT)
Dept: LAB | Facility: HOSPITAL | Age: 69
End: 2019-09-24
Attending: INTERNAL MEDICINE
Payer: MEDICARE

## 2019-09-24 VITALS
HEIGHT: 63 IN | OXYGEN SATURATION: 96 % | DIASTOLIC BLOOD PRESSURE: 63 MMHG | RESPIRATION RATE: 16 BRPM | BODY MASS INDEX: 30.9 KG/M2 | HEART RATE: 86 BPM | WEIGHT: 174.38 LBS | TEMPERATURE: 99 F | SYSTOLIC BLOOD PRESSURE: 127 MMHG

## 2019-09-24 DIAGNOSIS — M35.1 MCTD (MIXED CONNECTIVE TISSUE DISEASE): ICD-10-CM

## 2019-09-24 DIAGNOSIS — Z86.19 HISTORY OF CLOSTRIDIUM DIFFICILE INFECTION: ICD-10-CM

## 2019-09-24 DIAGNOSIS — C92.00 AML (ACUTE MYELOBLASTIC LEUKEMIA): ICD-10-CM

## 2019-09-24 DIAGNOSIS — C92.00 ACUTE MYELOID LEUKEMIA NOT HAVING ACHIEVED REMISSION: Primary | ICD-10-CM

## 2019-09-24 DIAGNOSIS — M54.6 ACUTE BILATERAL THORACIC BACK PAIN: ICD-10-CM

## 2019-09-24 LAB
ALBUMIN SERPL BCP-MCNC: 3.7 G/DL (ref 3.5–5.2)
ALP SERPL-CCNC: 96 U/L (ref 55–135)
ALT SERPL W/O P-5'-P-CCNC: 10 U/L (ref 10–44)
ANION GAP SERPL CALC-SCNC: 9 MMOL/L (ref 8–16)
AST SERPL-CCNC: 13 U/L (ref 10–40)
BILIRUB SERPL-MCNC: 0.4 MG/DL (ref 0.1–1)
BUN SERPL-MCNC: 17 MG/DL (ref 8–23)
CALCIUM SERPL-MCNC: 9.8 MG/DL (ref 8.7–10.5)
CHLORIDE SERPL-SCNC: 105 MMOL/L (ref 95–110)
CO2 SERPL-SCNC: 24 MMOL/L (ref 23–29)
CREAT SERPL-MCNC: 1.1 MG/DL (ref 0.5–1.4)
ERYTHROCYTE [DISTWIDTH] IN BLOOD BY AUTOMATED COUNT: 13.5 % (ref 11.5–14.5)
EST. GFR  (AFRICAN AMERICAN): 59.6 ML/MIN/1.73 M^2
EST. GFR  (NON AFRICAN AMERICAN): 51.7 ML/MIN/1.73 M^2
GLUCOSE SERPL-MCNC: 110 MG/DL (ref 70–110)
HCT VFR BLD AUTO: 36.2 % (ref 37–48.5)
HGB BLD-MCNC: 10.9 G/DL (ref 12–16)
IMM GRANULOCYTES # BLD AUTO: 0.04 K/UL (ref 0–0.04)
MCH RBC QN AUTO: 31.4 PG (ref 27–31)
MCHC RBC AUTO-ENTMCNC: 30.1 G/DL (ref 32–36)
MCV RBC AUTO: 104 FL (ref 82–98)
NEUTROPHILS # BLD AUTO: 2.8 K/UL (ref 1.8–7.7)
PLATELET # BLD AUTO: 100 K/UL (ref 150–350)
PMV BLD AUTO: 10.1 FL (ref 9.2–12.9)
POTASSIUM SERPL-SCNC: 4.6 MMOL/L (ref 3.5–5.1)
PROT SERPL-MCNC: 7 G/DL (ref 6–8.4)
RBC # BLD AUTO: 3.47 M/UL (ref 4–5.4)
SODIUM SERPL-SCNC: 138 MMOL/L (ref 136–145)
WBC # BLD AUTO: 4.42 K/UL (ref 3.9–12.7)

## 2019-09-24 PROCEDURE — 99999 PR PBB SHADOW E&M-EST. PATIENT-LVL IV: CPT | Mod: PBBFAC,,, | Performed by: INTERNAL MEDICINE

## 2019-09-24 PROCEDURE — 85027 COMPLETE CBC AUTOMATED: CPT

## 2019-09-24 PROCEDURE — 36415 COLL VENOUS BLD VENIPUNCTURE: CPT

## 2019-09-24 PROCEDURE — 99999 PR PBB SHADOW E&M-EST. PATIENT-LVL IV: ICD-10-PCS | Mod: PBBFAC,,, | Performed by: INTERNAL MEDICINE

## 2019-09-24 PROCEDURE — 99215 PR OFFICE/OUTPT VISIT, EST, LEVL V, 40-54 MIN: ICD-10-PCS | Mod: S$PBB,,, | Performed by: INTERNAL MEDICINE

## 2019-09-24 PROCEDURE — 80053 COMPREHEN METABOLIC PANEL: CPT

## 2019-09-24 PROCEDURE — 99214 OFFICE O/P EST MOD 30 MIN: CPT | Mod: PBBFAC | Performed by: INTERNAL MEDICINE

## 2019-09-24 PROCEDURE — 99215 OFFICE O/P EST HI 40 MIN: CPT | Mod: S$PBB,,, | Performed by: INTERNAL MEDICINE

## 2019-09-24 NOTE — PROGRESS NOTES
Hematology and Medical Oncology   Follow Up Note     09/24/2019    Primary Oncologic Diagnosis: AML, FLT 3 + and NPMN1+.   History of Present Ilness:   Sabrina Badillo) is a pleasant 69 y.o.female presents to clinic following 2 induction therapies for AML. She was in the hospital roughly 50 days to receive 7+3 and then FLAG JENNIFER.    Oncology History:   68 y.o. female admitted overnight for possible new AML. Came in from OSH with elevated WBC of 100.   05/25/2018: Pt is now on hydrea 2 grams BID, allopurinol 300 mg daily, and IVF. Pt may need rasburicase this weekend. She will undergo a BM bx and aspiration today. She is an induction candidate and will not be screened for research trials. She has anxiety for which she usually takes xanax, this was re-started.   05/26/2018 PICC placed. Reports she slept well last night. Anxiety improved with prn xanax. EF 65%, HIV and Hep panel negative. 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  6/12/2018: Day 15 of 7+3 induction, restaging BM bx done yesterday. On Midostaurin. Fever yesterday and BC with gram + cocci in clusters. On cefepime day 2 and will start Vanc today. Labial mucositis improving.   6/13/2018: Day 16 7+3. BC with staph aureus, identification pending. Surveillance blood cultures done today. Afebrile x 48 hours. On cefepime and Vanc. Labial mucositis resolved. No complaints of pain. Nausea controlled. No diarrhea. Primary complaint is fatigue.   6/14/20018: Day 17 of 7+3. Day 14 bone marrow results pending. BC with staph epidermis only sensitive to Vancomycin. Vanc day 3 and cefepime day 4. Vanc trough 12.2 last night. BP remains low but stable. Afebrile x 72 hours.   6/15/2018: Day 18 of 7+3. Day 14 bone marrow biopsy shows persistent disease with 15% blasts. Discussion with patient regarding treatment and she is deciding if she wants to proceed with re-induction vs outpatient maintenance. Temp of 100.4 overnight, unsustained. Day 5 Cefepime and Day 4 of  Vanc for staph epidermis. Repeat cultures NGTD. BP improved. Electrolytes (mag, phos, K and calcium) replaced aggressively this am and will repeat labs this afternoon. No complaints this am.   06/16/2018: Day 19 of 7+3. Day 2 of Flag-JENNIFER. Remains afebrile. Calcium remains low and have increased PO supplementation. Remains on vanc and aztreonam. Somewhat loopy feeling after receiving benadryl.   06/17/2018: Day 20 of 7+3. Day 3 of FLAG-JENNIFER. Multiple electrolyte abnormalities. New bilateral leg and feet swelling.   6/18/2018: Day 21 of 7+3 and Day 4 of FLAG JENNIFER. Tolerating well with some nausea controlled with Zofran. VSS, afebrile. ANC 1900 on Neupogen. Continues Vanc for staph epi bacteremia. Multiple electrolytes replaced today. Complains of edema to lower extremities, not improved after 20 of lasix yesterday. No sob or CP.   6/19/18: Day 22 of 7+3 and Day 5 of FLAG JENNIFER. VSS, afebrile, ANC 3900. Vanc trough elevated and dose adjusted this am. Lower extremity edema improved after 40 of lasix yesterday, net negative 300 cc I&O. Mild nausea, no abdominal pain or diarrhea. Poor appetite but no difficulty eating or taking pills.   6/20/18: Day 23 of 7+3 and Day 6 of FLAG JENNIFER. Patient complains of nausea and vomiting overnight and this am, especially when taking pills. Will add Zyprexa daily in addition to Zofran, compazine, and ativan PRN and will change meds to IV. 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 Afebrile.  No diarrhea, mouth sores or pain.  06/21/2018: Day 24 of 7+3 and Day 7 of FLAG JENNIFER. Nausea better controlled. Continued on Vanc.  6/22/2018: Day 25 of 7+3 and Day 8 of FLAG JENNIFER. Nausea improved some with Zyprexa but did have 1 episode of emesis overnight.continuing meds IV due to vomiting. Remains afebrile. ANC 0. Continues Vanc and Flagyl. Electrolytes replaced.   06/23/2018 : day 26 of 7+3 and Day 9 of FLAG JENNIFER.  Nausea persists,  worsened after taking pills so meds converted to IV.  Encouraged ambulation.  Continue with flagyl for leptotrichia goodfellowii.  RUQ US showed intrahepatic dilation, overall impression hepatic steatosis.  CBD 0.5cm.  No Gallbladder.    06/24/2018 : day 27 of 7+3 and Day 10 of FLAG JENNIFER.  Nausea persisting, able to tolerate some po pills.  Last day of flagyl (day 5).  Still pancytopenic. Appetite still low as po intake triggers nausea.  06/25/2018: day 28 of 7+3 and Day 11 of FLAG JNENIFER. Afebrile, ANC 0. Has completed Flagyl. Will decrease Vanc to 1000 mg q12, trough 19.9, will complete Vanc on 6/27. Liver enzymes stable on Midostaurin. VSS.   6/26/2018: day 29 of 7+3 and day 12 of FLAG JENNIFER. Liver enzymes improved and Midostaurin increased to full dose 50 mg bid. Nausea controlled, afebrile, VSS, NEON.  6/27/2018: day 30 of 7+3 and Day 13 of FLAG JENNIFER. Persistent nausea and emesis x 1 yesterday. Compazine changed to scheduled. Vanc ends today. Afebrile, VSS. Aggressive electrolyte replacement, low electrolytes possibly due to Midostaurin.   6/28/2018: day 31 of 7+3 and Day 14 of FLAG JENNIFER. Nausea improved with scheduled Compazine. Patient has agreed to start converting some IV meds to po. VSS, afebrile, electrolytes wnl today. Only complains of fatigue, new rash noted to upper back and chest and legs, papular rash mildly red.  6/29/2018: Day 32 of 7+3 and Day 15 of FLAG JENNIFER. Day 14 restaging bone marrow biopsy done at bedside today. Patient states nausea improved but she did have emesis last night after taking 9 pm pills. Rash improved. No mouth sores, diarrhea or pain.   7/2/2018: Day 35 of 7+3 and Day 18 of FLAG JENNIFER. Restaging BM bx results pending. Fluid overload over the weekend. Chest x-ray with pulmonary edema. Os sats down to 88%. Placed on O2 at 2 L NC, off O2 this am. Received lasix. Net negative 2.7 L today. 1 episode of nausea, no emesis. Reports anxiety relieved with PRN meds. Electrolytes improved,  afebrile, VSS.   7/3/2018: Day 36 of 7+3 and Day 19 of FLAG JENNIFER. Restaging Bm bx with GABRIEL. Cardiology consulted for abnormal echo, EF 30% with pulmonary HTN and severe L atrial enlargement. EKG with T wave abnormality, possible anterolateral ischemia and prolonged QTc of 530. Medications adjusted. Nausea controlled, no diarrhea. Electrolytes improved. On O2 as needed.   07/04/2018 : Day 37 of 7+3 and Day 20 of FLAG JENNIFER Diarrhea in AM, watery, no associated abdominal pain, nausea, or vomiting.    07/05/2018: Day 38 of 7+3 ane Day 21 of FLAG JENNIFER. No CP or SOB, cardiology following. On RA. All meds changed to po, denies nausea or vomiting and no diarrhea. VSS, afebrile.   7/6/2018: Day 22 of FLAG JENNIFER. Continues to tolerate po meds with no nausea. Afebrile. Awaiting count recovery for discharge.  7/7/2018-/8/2018: Day 40/41 of 7+3, Day 23/24 of FLAG JENNIFER.  Improving clinically.  Started on mag oxide per patient request.  Labs showing signs of ANC recovery.  7/9/2018: Day 25 FLAG JENNIFER, , continues Neupogen. Received platelets today. Afebrile, VSS. Tolerating all oral meds with no nausea or vomiting. Only complains of fatigue.   7/10/2018: Day 26 FLAG JENNIFER,  today. Had 2 episodes of vomiting and diarrhea last night. Feeling better. Afebrile, VSS.   7/11/2018: Day 27 FLAG JENNIFER,  today. No vomiting or diarrhea overnight and no nausea. Afebrile, VSS. Complains of back pain (bone pain) suspect due to count recovery. Relieved with oxy IR and Zyrtec started.  07/12/2018: Day 28 flag jennifer, engrafted today with ANC of 730. Back pain improved with zyrtec. Scheduled for IT chemo tomorrow at 1:30 pm and discharge home thereafter. Will likely need plt transfusion prior to IT chemo tomorrow   7/13/2018: Day 29 of FLAG JENNIFER. ANC up to 1300 today. Transfusing platelets today prior to LP for IT chemo. Patient continues to complain of bone pain, mostly to back and legs. No nausea, diarrhea, sob. Afebrile and VSS.    --hospitalized 7/23-7/25 for C.diff diarrhea, neutropenic fever.  While inpatient she developed pink blisters on her palm that were concerning for relapse.  --palm biopsy on 7/26/18 for suspicion of leukemia cutis was also negative for sign of relapse  --bone marrow biopsy 7/31/18 was negative for signs of relapse   --Admitted on 8/14/18 for HiDAC#1  --Admitted on 9/11/18 for HiDAC#2  --Admitted on 10/16/18 for HiDAC#3  --Post consolidation bone marrow biopsy on 12/27/2018: AML FISH (BM) Interpretation: The result is within normal limits for the AML FISH panel. No evidence of residual disease.  --Bone Marrow Biopsy on 5/9/19 NO MORPHOLOGIC EVIDENCE OF RESIDUAL ACUTE MYELOID LEUKEMIA. RECOMMEND NGS STUDY TO RULE OUT RESIDUAL DISEASE. deletion 20q    Interval History:   Ms. Wilkes has been doing quite well. She has recovered from her surgeries. She has no new issues or complaints today. Her energy has bee good.  She is pleased to hear about her blood work.       Past Medical History:   Past Medical History:   Diagnosis Date    Cancer 03/2018    AML    CHF (congestive heart failure)     Diarrhea 9/14/2018    Encounter for blood transfusion        Current Medications:   Current Outpatient Medications   Medication Sig    acyclovir (ZOVIRAX) 200 MG capsule Take 2 capsules (400 mg total) by mouth 2 (two) times daily.    ALPRAZolam (XANAX) 0.5 MG tablet Take 0.5 tablets (0.25 mg total) by mouth 3 (three) times daily as needed for Insomnia or Anxiety.    ciprofloxacin HCl (CIPRO) 500 MG tablet Take 1 tablet (500 mg total) by mouth every 12 (twelve) hours.    escitalopram oxalate (LEXAPRO) 20 MG tablet Take 20 mg by mouth.    fluconazole (DIFLUCAN) 200 MG Tab Take 2 tablets (400 mg total) by mouth once daily.    LORazepam (ATIVAN) 0.5 MG tablet Take 1 tablet (0.5 mg total) by mouth every 6 (six) hours as needed (nausea).    magnesium oxide (MAG-OX) 400 mg tablet Take 2 tablets (800 mg total) by mouth 2 (two)  times daily.    metoprolol succinate (TOPROL XL) 25 MG 24 hr tablet Take 1 tablet (25 mg total) by mouth once daily.    midostaurin (RYDAPT) 25 mg Cap Take 50 mg by mouth 2 (two) times daily on days 8 to 21 of cycle    nystatin (DUKE'S SOLUTION) Take 10 mLs by mouth 4 (four) times daily.    omeprazole (PRILOSEC OTC) 20 MG tablet Take 20 mg by mouth every morning.    ondansetron (ZOFRAN) 8 MG tablet Take 1 tablet (8 mg total) by mouth every 12 (twelve) hours as needed for Nausea.    oxyCODONE-acetaminophen (PERCOCET) 7.5-325 mg per tablet Take 1 tablet by mouth every 4 (four) hours as needed for Pain.    predniSONE (DELTASONE) 10 MG tablet TAKE ONE TABLET BY MOUTH ONCE DAILY AS NEEDED FOR ARTHRITIS FLARE    vitamin D 1000 units Tab Take 1 tablet (1,000 Units total) by mouth once daily.    walker (ULTRA-LIGHT ROLLATOR) Misc 1 Units by Misc.(Non-Drug; Combo Route) route once daily.     No current facility-administered medications for this visit.      ALLERGIES:   Review of patient's allergies indicates:   Allergen Reactions    Methotrexate analogues      Elevated Liver Enzyme    Bactrim [sulfamethoxazole-trimethoprim] Nausea And Vomiting and Rash       Review of Systems:     Review of Systems   Constitutional: Positive for fatigue. Negative for appetite change, chills, diaphoresis, fever and unexpected weight change.   HENT:   Negative for hearing loss, mouth sores, nosebleeds, sore throat, trouble swallowing and voice change.    Eyes: Negative for eye problems and icterus.   Respiratory: Negative for chest tightness, cough, hemoptysis, shortness of breath and wheezing.    Cardiovascular: Negative for chest pain, leg swelling and palpitations.   Gastrointestinal: Negative for abdominal distention, abdominal pain, blood in stool, diarrhea, nausea and vomiting.   Endocrine: Negative for hot flashes.   Genitourinary: Negative for bladder incontinence, difficulty urinating, dysuria and hematuria.     Musculoskeletal: Positive for myalgias. Negative for arthralgias, back pain, flank pain, gait problem, neck pain and neck stiffness.   Skin: Negative for itching, rash and wound.   Neurological: Negative for dizziness, extremity weakness, gait problem, headaches, numbness, seizures and speech difficulty.   Hematological: Negative for adenopathy. Does not bruise/bleed easily.   Psychiatric/Behavioral: Positive for sleep disturbance. Negative for confusion and depression. The patient is not nervous/anxious.         Physical Exam:     Vitals:    09/24/19 1314   BP: 127/63   Pulse: 86   Resp: 16   Temp: 98.6 °F (37 °C)       Physical Exam   Constitutional: She is oriented to person, place, and time. She appears well-developed and well-nourished. No distress.   HENT:   Head: Normocephalic and atraumatic.   Mouth/Throat: Oropharynx is clear and moist. No oropharyngeal exudate.   Hair has regrown     Eyes: Pupils are equal, round, and reactive to light. Conjunctivae and EOM are normal.   Neck: Normal range of motion. Neck supple. No JVD present. No tracheal deviation present. No thyromegaly present.   Cardiovascular: Normal rate, regular rhythm and normal heart sounds. Exam reveals no friction rub.   No murmur heard.  Pulmonary/Chest: Effort normal and breath sounds normal. No stridor. No respiratory distress. She has no wheezes. She has no rales. She exhibits no tenderness.   Abdominal: Soft. Bowel sounds are normal. She exhibits no distension. There is no tenderness. There is no rebound and no guarding.   Musculoskeletal: Normal range of motion. She exhibits no edema, tenderness or deformity.   Lymphadenopathy:     She has no axillary adenopathy.   Neurological: She is alert and oriented to person, place, and time. She displays normal reflexes. No cranial nerve deficit or sensory deficit. She exhibits normal muscle tone. Coordination normal.   Skin: Skin is warm and dry. Capillary refill takes less than 2 seconds. No  rash noted. She is not diaphoretic. No erythema. No pallor.   Psychiatric: She has a normal mood and affect. Her behavior is normal. Judgment and thought content normal.       ECOG Performance Status: (foot note - ECOG PS provided by Eastern Cooperative Oncology Group) 1 - Symptomatic but completely ambulatory    Karnofsky Performance Score:  80%- Normal Activity with Effort: Some Symptoms of Disease    Labs:   Lab Results   Component Value Date    WBC 4.42 09/24/2019    HGB 10.9 (L) 09/24/2019    HCT 36.2 (L) 09/24/2019     (L) 09/24/2019    ALT 10 09/24/2019    AST 13 09/24/2019     09/24/2019    K 4.6 09/24/2019     09/24/2019    CREATININE 1.1 09/24/2019    BUN 17 09/24/2019    CO2 24 09/24/2019    INR 1.1 05/31/2018       Imaging: previous imaging has been reviewed    Assessment and Plan:     Mrs. Mac is a pleasant 69 year old female with recently diagnosed AML.      Acute Monocytic Leukemia  -- Admitted from [facility] on 5/25/18 early AM with WBC around 100s, for suspected new non-M3 AML  --lo res HLA typing on 5/26/18 and hi res on 5/27/18 done in anticipation of possible need for future transplant   --Pt with two daughters, two full sisters (in their mid 60s, one with brain aneurysm hx, other one without any medical issues), and one full brother (61 y/o healthy).  --NPM1 +, CEBPA (-), FLT3 (+).  On Midostaurin 50 mg PO BID until Day 14 (held starting 6/8 to 6/11). Stopped when FLAG JENNIFER re-induction started. Restarted Midostaurin at 25 mg bid on day 8 of FLAG JENNIFER induction (6/22), increase to full dose 50 mg bid (6/26) as improvement in liver enzymes. Stopped 7/3/18 due to abnormal cardiac echo.  --day 14 bone marrow biopsy completed 6/11 showing persistent disease with 15% CD 34 positive blasts  --Day 60 of FLAG-JENNIFER, tolerating without difficulty except nausea/vomiting  --day 14 restaging bone marrow biopsy done 6/29 without complication, results with no evidence of AML, FLT 3  negative, will need repeat marrow at count recovery outpatient   --recovery marrow showed no evidence of disease  --HiDAC #1 on 8/14/18, HiDAC#2 on 9/11/18  --Cycle 3 delayed 1 week due to pancytopenia. Started on 10/16/18  --recovery bone marrow showed no signs of residual leukemia  --Bone marrow biopsy done in May shows no overt signs of recurrent disease    Thrombocytopenia  --Previously in the 70-90 range  --100 today    Suspected Leukemia Cutis  --pathology from dermatology confirmed inflammatory changes  --now resolved with topical steroid cream    Abnormal Liver Function Tests  --ALT and AST and Alk phos have again increased substantially since re-starting midostaurin.   --midostaurin started day 8 at 25 mg bid, monitoring liver enzymes closely and improved. Increasing Midostaurin to 50 mg bid 6/26. Stopped Midostaurin (7/3) due to new diagnosis of systolic heart failure EF 35%.  --6/22/18 liver US showing mild intrahepatic dilation, otherwise impression of hepatic steatosis.  Will hold off of MRCP at this time.    --permanently discontinued midostaurin     Chemotherapy induced cardiomyopathy  --cardiology follow-up as outpatient in 3 months  --repeat 2D echo on 6/15 with preserved EF of 60%.  However 7/2/18 echo with reduced EF 30-35%  --Echo on 8/15/18 was within normal range EF of 60-65%    Rheumatologic issues  --Previously on steroids and methotrexate  --working with [facility]      30 minutes were spent face to face with the patient and her  family to discuss the disease, natural history, treatment options and survival statistics. I have provided the patient with an opportunity to ask questions and have all questions answered to her satisfaction.       she will return to the clinic in 4 weeks, but knows to call in the interim if symptoms change or should a problem arise.        Laquita Troy MD  Hematology and Medical Oncology  Bone Marrow Transplant  [facility]

## 2019-10-07 ENCOUNTER — PATIENT MESSAGE (OUTPATIENT)
Dept: HEMATOLOGY/ONCOLOGY | Facility: CLINIC | Age: 69
End: 2019-10-07

## 2019-10-09 DIAGNOSIS — G89.3 CANCER RELATED PAIN: Primary | ICD-10-CM

## 2019-10-09 RX ORDER — TRAMADOL HYDROCHLORIDE 50 MG/1
50 TABLET ORAL EVERY 6 HOURS PRN
Qty: 30 TABLET | Refills: 0 | Status: SHIPPED | OUTPATIENT
Start: 2019-10-09 | End: 2020-12-21

## 2019-10-11 ENCOUNTER — HOSPITAL ENCOUNTER (EMERGENCY)
Facility: HOSPITAL | Age: 69
Discharge: HOME | End: 2019-10-11
Attending: EMERGENCY MEDICINE
Payer: MEDICARE

## 2019-10-11 VITALS
DIASTOLIC BLOOD PRESSURE: 61 MMHG | SYSTOLIC BLOOD PRESSURE: 118 MMHG | HEIGHT: 64 IN | BODY MASS INDEX: 29.71 KG/M2 | HEART RATE: 90 BPM | RESPIRATION RATE: 18 BRPM | OXYGEN SATURATION: 93 % | TEMPERATURE: 99 F | WEIGHT: 174 LBS

## 2019-10-11 DIAGNOSIS — B34.9 VIRAL SYNDROME: ICD-10-CM

## 2019-10-11 DIAGNOSIS — R53.83 OTHER FATIGUE: Primary | ICD-10-CM

## 2019-10-11 DIAGNOSIS — J02.9 SORE THROAT: ICD-10-CM

## 2019-10-11 DIAGNOSIS — R06.02 SHORTNESS OF BREATH: ICD-10-CM

## 2019-10-11 DIAGNOSIS — H92.02 LEFT EAR PAIN: ICD-10-CM

## 2019-10-11 LAB
ALBUMIN SERPL BCP-MCNC: 3.5 G/DL (ref 3.5–5.2)
ALP SERPL-CCNC: 203 U/L (ref 55–135)
ALT SERPL W/O P-5'-P-CCNC: 56 U/L (ref 10–44)
ANION GAP SERPL CALC-SCNC: 10 MMOL/L (ref 8–16)
AST SERPL-CCNC: 23 U/L (ref 10–40)
BACTERIA #/AREA URNS AUTO: ABNORMAL /HPF
BILIRUB SERPL-MCNC: 0.7 MG/DL (ref 0.1–1)
BILIRUB UR QL STRIP: NEGATIVE
BNP SERPL-MCNC: 22 PG/ML (ref 0–99)
BUN SERPL-MCNC: 25 MG/DL (ref 8–23)
CALCIUM SERPL-MCNC: 10.1 MG/DL (ref 8.7–10.5)
CHLORIDE SERPL-SCNC: 99 MMOL/L (ref 95–110)
CLARITY UR REFRACT.AUTO: ABNORMAL
CO2 SERPL-SCNC: 25 MMOL/L (ref 23–29)
COLOR UR AUTO: ABNORMAL
CREAT SERPL-MCNC: 0.8 MG/DL (ref 0.5–1.4)
ERYTHROCYTE [DISTWIDTH] IN BLOOD BY AUTOMATED COUNT: 13.1 % (ref 11.5–14.5)
EST. GFR  (AFRICAN AMERICAN): >60 ML/MIN/1.73 M^2
EST. GFR  (NON AFRICAN AMERICAN): >60 ML/MIN/1.73 M^2
GLUCOSE SERPL-MCNC: 95 MG/DL (ref 70–110)
GLUCOSE UR QL STRIP: NEGATIVE
HCT VFR BLD AUTO: 39.2 % (ref 37–48.5)
HETEROPH AB SERPL QL IA: NEGATIVE
HGB BLD-MCNC: 12.7 G/DL (ref 12–16)
HGB UR QL STRIP: ABNORMAL
HYALINE CASTS UR QL AUTO: 2 /LPF
INFLUENZA A, MOLECULAR: NEGATIVE
INFLUENZA B, MOLECULAR: NEGATIVE
KETONES UR QL STRIP: ABNORMAL
LEUKOCYTE ESTERASE UR QL STRIP: NEGATIVE
MCH RBC QN AUTO: 32.3 PG (ref 27–31)
MCHC RBC AUTO-ENTMCNC: 32.4 G/DL (ref 32–36)
MCV RBC AUTO: 100 FL (ref 82–98)
MICROSCOPIC COMMENT: ABNORMAL
NITRITE UR QL STRIP: NEGATIVE
NON-SQ EPI CELLS #/AREA URNS AUTO: <1 /HPF
PH UR STRIP: 6 [PH] (ref 5–8)
PLATELET # BLD AUTO: 122 K/UL (ref 150–350)
PMV BLD AUTO: 10.5 FL (ref 9.2–12.9)
POTASSIUM SERPL-SCNC: 4.2 MMOL/L (ref 3.5–5.1)
PROT SERPL-MCNC: 7.6 G/DL (ref 6–8.4)
PROT UR QL STRIP: ABNORMAL
RBC # BLD AUTO: 3.93 M/UL (ref 4–5.4)
RBC #/AREA URNS AUTO: 3 /HPF (ref 0–4)
SODIUM SERPL-SCNC: 134 MMOL/L (ref 136–145)
SP GR UR STRIP: 1.02 (ref 1–1.03)
SPECIMEN SOURCE: NORMAL
TROPONIN I SERPL DL<=0.01 NG/ML-MCNC: 0.01 NG/ML (ref 0–0.03)
URN SPEC COLLECT METH UR: ABNORMAL
WBC # BLD AUTO: 5.68 K/UL (ref 3.9–12.7)
WBC #/AREA URNS AUTO: 1 /HPF (ref 0–5)

## 2019-10-11 PROCEDURE — 80053 COMPREHEN METABOLIC PANEL: CPT

## 2019-10-11 PROCEDURE — 93010 EKG 12-LEAD: ICD-10-PCS | Mod: ,,, | Performed by: INTERNAL MEDICINE

## 2019-10-11 PROCEDURE — 93005 ELECTROCARDIOGRAM TRACING: CPT

## 2019-10-11 PROCEDURE — 85027 COMPLETE CBC AUTOMATED: CPT

## 2019-10-11 PROCEDURE — 96360 HYDRATION IV INFUSION INIT: CPT

## 2019-10-11 PROCEDURE — 81001 URINALYSIS AUTO W/SCOPE: CPT

## 2019-10-11 PROCEDURE — 86308 HETEROPHILE ANTIBODY SCREEN: CPT

## 2019-10-11 PROCEDURE — 93010 ELECTROCARDIOGRAM REPORT: CPT | Mod: ,,, | Performed by: INTERNAL MEDICINE

## 2019-10-11 PROCEDURE — 99285 PR EMERGENCY DEPT VISIT,LEVEL V: ICD-10-PCS | Mod: ,,, | Performed by: EMERGENCY MEDICINE

## 2019-10-11 PROCEDURE — 99285 EMERGENCY DEPT VISIT HI MDM: CPT | Mod: 25

## 2019-10-11 PROCEDURE — 84484 ASSAY OF TROPONIN QUANT: CPT

## 2019-10-11 PROCEDURE — 63600175 PHARM REV CODE 636 W HCPCS: Performed by: EMERGENCY MEDICINE

## 2019-10-11 PROCEDURE — 87502 INFLUENZA DNA AMP PROBE: CPT

## 2019-10-11 PROCEDURE — 99285 EMERGENCY DEPT VISIT HI MDM: CPT | Mod: ,,, | Performed by: EMERGENCY MEDICINE

## 2019-10-11 PROCEDURE — 83880 ASSAY OF NATRIURETIC PEPTIDE: CPT

## 2019-10-11 PROCEDURE — 25000003 PHARM REV CODE 250: Performed by: EMERGENCY MEDICINE

## 2019-10-11 RX ORDER — ACETAMINOPHEN 325 MG/1
650 TABLET ORAL
Status: COMPLETED | OUTPATIENT
Start: 2019-10-11 | End: 2019-10-11

## 2019-10-11 RX ADMIN — SODIUM CHLORIDE, SODIUM LACTATE, POTASSIUM CHLORIDE, AND CALCIUM CHLORIDE 250 ML: .6; .31; .03; .02 INJECTION, SOLUTION INTRAVENOUS at 11:10

## 2019-10-11 RX ADMIN — ACETAMINOPHEN 650 MG: 325 TABLET ORAL at 11:10

## 2019-10-11 NOTE — ED TRIAGE NOTES
Pt arrived from home with chief complaint of sore throat for over a week. Airway non compromised. Pt speaking clear and fluent sentences. O2 sat above 95% on room air. Pt went to see primary care and they gave her ampicillin . She has been taking it and it has not helped. Pt hx of AML. Pt states having low grade fevers at home accompanied with nausea and vomiting. Pt states slight throat pain. Pt denies any other medical complaints. Pt aaoX4.  No acute distress noted at this time. . Did get recently tested and positive for UTI. Pt states feeling lethargic      at bedside      Psychosocial:  Patient is calm and cooperative.  Patients insight and judgement are appropriate to situation.  Appears clean, well maintained, with clothing appropriate to environment.  No evidence of delusions, hallucinations, or psychosis.     Neuro:  Eyes open spontaneously.  Awake, alert, oriented x 4.  Speech clear and appropriate.  Tolerating saliva secretions well.  Able to follow commands, demonstrating ability to actively and appropriately communicate within context of current conversation.  Symmetrical facial muscles.  Moving all extremities well with no noted weakness.  Adequate muscle tone present.    Movement is purposeful.  No evidence of impaired sensation.  Response to external stimuli appropriately.  No noted drifts.     Airway:  Bilateral chest rise and fall.  RR regular and non-labored.  Air entry patent with and clear x 5 lobes of the lungs.  No crepitus or subcutaneous emphysema noted on palpation.       Circulatory:  Skin warm, dry, and pink.  Apical and radial pulses strong and regular.  Capillary refill/skin blanching less than 3 seconds to distal of 4 extremities.     Abdomen:  Abdomen soft and non-distended.  Positive normo-active bowel sounds x 4 quadrants.       Urinary: Denies pressure, frequency, urgency, odor or pain.     Extremities:  No redness, heat, deformity, or pain.     Skin:  Intact with no  bruising/discolorations noted.

## 2019-10-11 NOTE — ED NOTES
Pt received discharge instructions. Denies further medical concerns upon discharge. Verbalizes understanding of discharge instructions and importance of follow up. Iv's removed and bleeding controlled.

## 2019-10-11 NOTE — ED PROVIDER NOTES
Encounter Date: 10/11/2019       History     Chief Complaint   Patient presents with    Multiple Complaints     told at another hospital viral sore throat, put on antibiotics, hx leukemia     Sabrina Mac is a 69 y.o. female who  has a past medical history of Cancer (03/2018), CHF (congestive heart failure), Diarrhea (9/14/2018), and Encounter for blood transfusion.    The patient presents to the ED due to multiple complaints.  Patient reports sore throat, fatigue, left ear pain starting about a week ago. She has been evaluated by her PCP and in urgent care in the last week. She was subsequently diagnosed with an ear infection, UTI, and dehydration.  She has been taking antibiotics but denies any significant improvement.  She feels her symptoms have gotten worse, and now feels extremely fatigued and weak.  She reports she has been unable to get out of bed for the last 2 days due to feeling weak.  She also reports a mild frontal headache. She reports some mild shortness of breath, which she admits is a symptom of her anxiety. She has a history of AML, currently in remission.  She is followed very closely by Oncology. She reports when she becomes ill, she starts to worry that her leukemia may be coming back.  She called her oncologist today, who told her to present to the ED for further evaluation.        Review of patient's allergies indicates:   Allergen Reactions    Methotrexate analogues      Elevated Liver Enzyme    Sulfa (sulfonamide antibiotics)      Rash, nausea, vomiting    Bactrim [sulfamethoxazole-trimethoprim] Nausea And Vomiting and Rash     Past Medical History:   Diagnosis Date    Cancer 03/2018    AML    CHF (congestive heart failure)     Diarrhea 9/14/2018    Encounter for blood transfusion      Past Surgical History:   Procedure Laterality Date    BONE MARROW BIOPSY Left 12/27/2018    Procedure: Biopsy-bone marrow;  Surgeon: Laquita Troy MD;  Location: [facility] OR [address] [place] [address];  Service:  Orthopedics;  Laterality: Left;    BONE MARROW BIOPSY Left 2019    Procedure: Biopsy-bone marrow;  Surgeon: Laquita Troy MD;  Location: [facility] OR [address] [place] [address];  Service: Orthopedics;  Laterality: Left;    CHOLECYSTECTOMY      HYSTERECTOMY      TONSILLECTOMY       Family History   Problem Relation Age of Onset    Cancer Mother     Cancer Father     Heart attack Neg Hx     Heart disease Neg Hx      Social History     Tobacco Use    Smoking status: Former Smoker     Packs/day: 0.50     Years: 10.00     Pack years: 5.00     Types: Cigarettes     Last attempt to quit: 3/25/2017     Years since quittin.5    Smokeless tobacco: Never Used   Substance Use Topics    Alcohol use: Yes     Alcohol/week: 1.0 standard drinks     Types: 1 Shots of liquor per week    Drug use: No     Review of Systems   Constitutional: Positive for activity change and appetite change. Negative for chills and fever.   HENT: Negative for sore throat.    Respiratory: Positive for shortness of breath. Negative for cough and chest tightness.    Cardiovascular: Negative for chest pain, palpitations and leg swelling.   Gastrointestinal: Negative for abdominal pain, constipation, diarrhea, nausea and vomiting.   Genitourinary: Negative for dysuria, frequency and urgency.   Musculoskeletal: Negative for back pain.   Skin: Negative for rash and wound.   Neurological: Positive for weakness. Negative for dizziness, seizures, syncope, speech difficulty, numbness and headaches.   Hematological: Does not bruise/bleed easily.   Psychiatric/Behavioral: Negative for agitation, behavioral problems and confusion.       Physical Exam     Initial Vitals [10/11/19 1057]   BP Pulse Resp Temp SpO2   104/64 100 18 98.6 °F (37 °C) 99 %      MAP       --         Physical Exam    Nursing note and vitals reviewed.  Constitutional: She appears well-developed and well-nourished. She is not diaphoretic. No distress.   Pleasant, well-appearing, no acute distress.    HENT:   Head: Normocephalic and atraumatic.   Right Ear: Tympanic membrane and ear canal normal. No middle ear effusion.   Mouth/Throat: Mucous membranes are normal. Posterior oropharyngeal erythema present. No oropharyngeal exudate or posterior oropharyngeal edema.   Mild thrush to posterior left throat.  No tonsillar abscess or exudates.  Left ear with debris, unable to visualize TM.   Eyes: EOM are normal. Pupils are equal, round, and reactive to light.   Neck: No tracheal deviation present.   Cardiovascular: Normal rate, regular rhythm, normal heart sounds and intact distal pulses.   Pulmonary/Chest: Breath sounds normal. No stridor. No respiratory distress. She has no decreased breath sounds. She has no wheezes.   Lungs clear.   Abdominal: Soft. Bowel sounds are normal. She exhibits no distension and no mass. There is no tenderness.   Musculoskeletal: Normal range of motion. She exhibits no edema.   Neurological: She is alert and oriented to person, place, and time. She has normal strength. No cranial nerve deficit or sensory deficit.   Skin: Skin is warm and dry. Capillary refill takes less than 2 seconds. No pallor.   Psychiatric: She has a normal mood and affect. Her behavior is normal. Thought content normal.         ED Course   Procedures  Labs Reviewed   CBC WITHOUT DIFFERENTIAL - Abnormal; Notable for the following components:       Result Value    RBC 3.93 (*)     Mean Corpuscular Volume 100 (*)     Mean Corpuscular Hemoglobin 32.3 (*)     Platelets 122 (*)     All other components within normal limits   URINALYSIS, REFLEX TO URINE CULTURE - Abnormal; Notable for the following components:    Appearance, UA Hazy (*)     Protein, UA 1+ (*)     Ketones, UA Trace (*)     Occult Blood UA 2+ (*)     All other components within normal limits    Narrative:     Preferred Collection Type->Urine, Clean Catch   COMPREHENSIVE METABOLIC PANEL - Abnormal; Notable for the following components:    Sodium 134 (*)      BUN, Bld 25 (*)     Alkaline Phosphatase 203 (*)     ALT 56 (*)     All other components within normal limits   URINALYSIS MICROSCOPIC - Abnormal; Notable for the following components:    Hyaline Casts, UA 2 (*)     All other components within normal limits    Narrative:     Preferred Collection Type->Urine, Clean Catch   INFLUENZA A & B BY MOLECULAR   TROPONIN I   B-TYPE NATRIURETIC PEPTIDE   HETEROPHILE AB SCREEN    Narrative:     ADD ON MONO PER DR BAKARI NASSAR JR  10/11/2019  14:01      EKG Readings: (Independently Interpreted)   Initial Reading: No STEMI. Previous EKG: Compared with most recent EKG Rhythm: Normal Sinus Rhythm.   Normal sinus rhythm, rate 89, no ST changes, no ischemia, normal intervals.  Compared to prior EKG 09/2018, grossly stable without significant change.     ECG Results          EKG 12-lead (Final result)  Result time 10/11/19 12:49:15    Final result by Interface, Lab In [facility] (10/11/19 12:49:15)                 Narrative:    Test Reason : J02.9,    Vent. Rate : 089 BPM     Atrial Rate : 089 BPM     P-R Int : 134 ms          QRS Dur : 074 ms      QT Int : 360 ms       P-R-T Axes : -29 -30 050 degrees     QTc Int : 438 ms    Normal sinus rhythm  Normal ECG  When compared with ECG of 15-AUG-2018 08:41,  Nonspecific T wave abnormality no longer evident in Anterior leads  Confirmed by Radha Agarwal MD ([id number]) on 10/11/2019 12:49:08 PM    Referred By:             Confirmed By:Radha Agarwal MD                            Imaging Results          X-Ray Chest PA And Lateral (Final result)  Result time 10/11/19 11:58:34    Final result by Alvaro Sanches MD (10/11/19 11:58:34)                 Impression:      No acute abnormality.    Electronically signed by resident: Andry Crow  Date:    10/11/2019  Time:    11:56    Electronically signed by: Alvaro Sanches MD  Date:    10/11/2019  Time:    11:58             Narrative:    EXAMINATION:  XR CHEST PA AND LATERAL    CLINICAL HISTORY:  Shortness of  breath    TECHNIQUE:  PA and lateral views of the chest were performed.    COMPARISON:  07/22/2018    FINDINGS:  The lungs are clear, with normal appearance of pulmonary vasculature and no pleural effusion or pneumothorax.  Left upper lobe granuloma as before    The cardiac silhouette is normal in size. The hilar and mediastinal contours are unremarkable.  There is calcific atherosclerosis of the aortic knob.    Degenerative change of the shoulders.                                 Medical Decision Making:   History:   Old Medical Records: I decided to obtain old medical records.  Old Records Summarized: other records.       <> Summary of Records: 10/04: Patient was evaluated by her PCP due to sore throat and earache.  Strep swab was negative, she was diagnosed with viral pharyngitis, and placed on Keflex empirically.  10/07:  Patient was evaluated in the emergency department due to worsening symptoms. Labs were unremarkable, and she was diagnosed with a ruptured otitis media, dehydration, UTI, and oral thrush.  She was started on Augmentin.  Initial Assessment:   69-year-old female with history of AML, followed by Oncology, presents to ED due to generalized fatigue, weakness, URI symptoms for the last week.  Vitals unremarkable, exam benign.  Will obtain labs, give IV fluids, and reassess.  Differential Diagnosis:   Differential Diagnosis includes, but is not limited to:  Sepsis, meningitis, cavernous sinus thrombosis, nasal foreign body, otitis media/external, nasal polyp, bacterial sinusitis, allergic rhinitis, influenza, bacterial/viral pharyngitis, peritonsillar abscess, retropharyngeal abscess, bacterial/viral pneumonia.    Clinical Tests:   Lab Tests: Ordered and Reviewed  Radiological Study: Ordered and Reviewed  Medical Tests: Reviewed and Ordered  ED Management:  EKG without ischemia or arrhythmia.  CXR clear.  Labs unremarkable, BUN mildly elevated suggesting dehydration, creatinine stable. Mild LFT  elevation, patient without abdominal pain, nausea/vomiting, or any other concerning symptoms.  Flu and Monospot negative.    On reassessment, patient remains well appearing.  Resting comfortably.  Vitals have remained stable.  Patient and family informed of findings and reassured.  I feel patient's presentation is most consistent with viral syndrome.  No emergent process evident on today's visit.  Patient was provided with ENT Clinic information for follow-up for ruptured TM.  Patient instructed to complete course of antibiotics.  Recommend close follow-up with PCP or Oncology for repeat labs and further management if symptoms do not improve.  Given strict return precautions including worsening symptoms, fever, inability tolerate p.o., severe dehydration, or any other concerns, and to return promptly to ED if any arise.  Patient family state understanding and are comfortable with discharge at this time.  Upon re-evaluation, the patient's status has improved.  After complete ED evaluation, clinical impression is most consistent with fatigue, viral syndrome.  PCP follow-up within 2-3 days was recommended.    After taking into careful account the patient's history, physical exam findings, as well as empirical and objective data obtained throughout ED workup, I feel no emergent medical condition has been identified. No further evaluation or admission was felt to be required, and the patient is stable for discharge from the ED. The patient and any additional family present were updated with test results, overall clinical impression, and recommended further plan of care, including discharge instructions as provided and outpatient follow-up for continued evaluation and management as needed. All questions were answered. The patient expressed understanding and agreed with current plan for discharge and follow-up plan of care. Strict ED return precautions were provided, including return/worsening of current symptoms, new  symptoms, or any other concerns.                        Clinical Impression:       ICD-10-CM ICD-9-CM   1. Other fatigue R53.83 780.79   2. Sore throat J02.9 462   3. Shortness of breath R06.02 786.05   4. Viral syndrome B34.9 079.99   5. Left ear pain H92.02 388.70         Disposition:   Disposition: Discharged  Condition: Stable                        Laith Riley MD  10/11/19 9896

## 2019-10-11 NOTE — ED NOTES
Pt went to xray. Now back in room on cardiac, O2, and blood pressure monitor. No distress noted. Pt updated on plan of care at this time

## 2019-10-16 ENCOUNTER — HOSPITAL ENCOUNTER (INPATIENT)
Facility: HOSPITAL | Age: 69
LOS: 2 days | Discharge: HOME | DRG: 094 | End: 2019-10-18
Attending: EMERGENCY MEDICINE | Admitting: INTERNAL MEDICINE
Payer: MEDICARE

## 2019-10-16 DIAGNOSIS — R41.82 ALTERED MENTAL STATUS: ICD-10-CM

## 2019-10-16 DIAGNOSIS — C93.01 ACUTE MONOCYTIC LEUKEMIA IN REMISSION: ICD-10-CM

## 2019-10-16 DIAGNOSIS — G93.40 ACUTE ENCEPHALOPATHY: ICD-10-CM

## 2019-10-16 DIAGNOSIS — H92.02 LEFT EAR PAIN: ICD-10-CM

## 2019-10-16 DIAGNOSIS — R41.0 DELIRIUM: Primary | ICD-10-CM

## 2019-10-16 LAB
ALBUMIN SERPL BCP-MCNC: 4.1 G/DL (ref 3.5–5.2)
ALP SERPL-CCNC: 152 U/L (ref 55–135)
ALT SERPL W/O P-5'-P-CCNC: 31 U/L (ref 10–44)
ANION GAP SERPL CALC-SCNC: 14 MMOL/L (ref 8–16)
AST SERPL-CCNC: 30 U/L (ref 10–40)
BASOPHILS # BLD AUTO: 0.01 K/UL (ref 0–0.2)
BASOPHILS NFR BLD: 0.3 % (ref 0–1.9)
BILIRUB SERPL-MCNC: 0.6 MG/DL (ref 0.1–1)
BILIRUB UR QL STRIP: NEGATIVE
BUN SERPL-MCNC: 22 MG/DL (ref 8–23)
CALCIUM SERPL-MCNC: 10.8 MG/DL (ref 8.7–10.5)
CHLORIDE SERPL-SCNC: 98 MMOL/L (ref 95–110)
CLARITY CSF: CLEAR
CLARITY UR REFRACT.AUTO: ABNORMAL
CO2 SERPL-SCNC: 21 MMOL/L (ref 23–29)
COLOR CSF: COLORLESS
COLOR UR AUTO: YELLOW
CREAT SERPL-MCNC: 1.1 MG/DL (ref 0.5–1.4)
DIFFERENTIAL METHOD: ABNORMAL
EOSINOPHIL # BLD AUTO: 0 K/UL (ref 0–0.5)
EOSINOPHIL NFR BLD: 0.5 % (ref 0–8)
ERYTHROCYTE [DISTWIDTH] IN BLOOD BY AUTOMATED COUNT: 13.1 % (ref 11.5–14.5)
EST. GFR  (AFRICAN AMERICAN): 59.6 ML/MIN/1.73 M^2
EST. GFR  (NON AFRICAN AMERICAN): 51.7 ML/MIN/1.73 M^2
GLUCOSE CSF-MCNC: 40 MG/DL (ref 40–70)
GLUCOSE SERPL-MCNC: 79 MG/DL (ref 70–110)
GLUCOSE UR QL STRIP: NEGATIVE
HCT VFR BLD AUTO: 37.6 % (ref 37–48.5)
HGB BLD-MCNC: 12.6 G/DL (ref 12–16)
HGB UR QL STRIP: ABNORMAL
IMM GRANULOCYTES # BLD AUTO: 0.04 K/UL (ref 0–0.04)
IMM GRANULOCYTES NFR BLD AUTO: 1.1 % (ref 0–0.5)
INR PPP: 1.1 (ref 0.8–1.2)
KETONES UR QL STRIP: ABNORMAL
LACTATE SERPL-SCNC: 1.2 MMOL/L (ref 0.5–2.2)
LEUKOCYTE ESTERASE UR QL STRIP: ABNORMAL
LYMPHOCYTES # BLD AUTO: 1.5 K/UL (ref 1–4.8)
LYMPHOCYTES NFR BLD: 40.2 % (ref 18–48)
LYMPHOCYTES NFR CSF MANUAL: 88 % (ref 40–80)
MCH RBC QN AUTO: 31.7 PG (ref 27–31)
MCHC RBC AUTO-ENTMCNC: 33.5 G/DL (ref 32–36)
MCV RBC AUTO: 95 FL (ref 82–98)
MICROSCOPIC COMMENT: ABNORMAL
MONOCYTES # BLD AUTO: 0.3 K/UL (ref 0.3–1)
MONOCYTES NFR BLD: 9 % (ref 4–15)
MONOS+MACROS NFR CSF MANUAL: 11 % (ref 15–45)
NEUTROPHILS # BLD AUTO: 1.8 K/UL (ref 1.8–7.7)
NEUTROPHILS NFR BLD: 48.9 % (ref 38–73)
NEUTROPHILS NFR CSF MANUAL: 1 % (ref 0–6)
NITRITE UR QL STRIP: NEGATIVE
NRBC BLD-RTO: 0 /100 WBC
PH UR STRIP: 6 [PH] (ref 5–8)
PLATELET # BLD AUTO: 149 K/UL (ref 150–350)
PMV BLD AUTO: 10.7 FL (ref 9.2–12.9)
POCT GLUCOSE: 87 MG/DL (ref 70–110)
POTASSIUM SERPL-SCNC: 5.6 MMOL/L (ref 3.5–5.1)
PROT CSF-MCNC: 82 MG/DL (ref 15–40)
PROT SERPL-MCNC: 8.6 G/DL (ref 6–8.4)
PROT UR QL STRIP: NEGATIVE
PROTHROMBIN TIME: 11.4 SEC (ref 9–12.5)
RBC # BLD AUTO: 3.98 M/UL (ref 4–5.4)
RBC # CSF: 6 /CU MM
RBC #/AREA URNS AUTO: 9 /HPF (ref 0–4)
SODIUM SERPL-SCNC: 133 MMOL/L (ref 136–145)
SP GR UR STRIP: 1.02 (ref 1–1.03)
SPECIMEN VOL CSF: 1 ML
SQUAMOUS #/AREA URNS AUTO: 1 /HPF
URN SPEC COLLECT METH UR: ABNORMAL
WBC # BLD AUTO: 3.66 K/UL (ref 3.9–12.7)
WBC # CSF: 71 /CU MM (ref 0–5)
WBC #/AREA URNS AUTO: 3 /HPF (ref 0–5)

## 2019-10-16 PROCEDURE — 82945 GLUCOSE OTHER FLUID: CPT

## 2019-10-16 PROCEDURE — 25500020 PHARM REV CODE 255: Performed by: EMERGENCY MEDICINE

## 2019-10-16 PROCEDURE — 96372 THER/PROPH/DIAG INJ SC/IM: CPT | Mod: 59

## 2019-10-16 PROCEDURE — 63600175 PHARM REV CODE 636 W HCPCS: Performed by: STUDENT IN AN ORGANIZED HEALTH CARE EDUCATION/TRAINING PROGRAM

## 2019-10-16 PROCEDURE — 82962 GLUCOSE BLOOD TEST: CPT | Mod: 59

## 2019-10-16 PROCEDURE — 83605 ASSAY OF LACTIC ACID: CPT

## 2019-10-16 PROCEDURE — 80053 COMPREHEN METABOLIC PANEL: CPT

## 2019-10-16 PROCEDURE — 81001 URINALYSIS AUTO W/SCOPE: CPT

## 2019-10-16 PROCEDURE — 96367 TX/PROPH/DG ADDL SEQ IV INF: CPT

## 2019-10-16 PROCEDURE — 99285 EMERGENCY DEPT VISIT HI MDM: CPT | Mod: 25

## 2019-10-16 PROCEDURE — 93005 ELECTROCARDIOGRAM TRACING: CPT

## 2019-10-16 PROCEDURE — 62270 DX LMBR SPI PNXR: CPT

## 2019-10-16 PROCEDURE — 96361 HYDRATE IV INFUSION ADD-ON: CPT | Mod: 59

## 2019-10-16 PROCEDURE — 84157 ASSAY OF PROTEIN OTHER: CPT

## 2019-10-16 PROCEDURE — 85025 COMPLETE CBC W/AUTO DIFF WBC: CPT

## 2019-10-16 PROCEDURE — 96375 TX/PRO/DX INJ NEW DRUG ADDON: CPT

## 2019-10-16 PROCEDURE — 87070 CULTURE OTHR SPECIMN AEROBIC: CPT

## 2019-10-16 PROCEDURE — 63600175 PHARM REV CODE 636 W HCPCS: Performed by: EMERGENCY MEDICINE

## 2019-10-16 PROCEDURE — 87205 SMEAR GRAM STAIN: CPT

## 2019-10-16 PROCEDURE — 87529 HSV DNA AMP PROBE: CPT

## 2019-10-16 PROCEDURE — 99285 PR EMERGENCY DEPT VISIT,LEVEL V: ICD-10-PCS | Mod: ,,, | Performed by: INTERNAL MEDICINE

## 2019-10-16 PROCEDURE — 93010 EKG 12-LEAD: ICD-10-PCS | Mod: ,,, | Performed by: INTERNAL MEDICINE

## 2019-10-16 PROCEDURE — 99000 SPECIMEN HANDLING OFFICE-LAB: CPT

## 2019-10-16 PROCEDURE — 89051 BODY FLUID CELL COUNT: CPT

## 2019-10-16 PROCEDURE — 25000003 PHARM REV CODE 250: Performed by: EMERGENCY MEDICINE

## 2019-10-16 PROCEDURE — 86403 PARTICLE AGGLUT ANTBDY SCRN: CPT

## 2019-10-16 PROCEDURE — 96365 THER/PROPH/DIAG IV INF INIT: CPT

## 2019-10-16 PROCEDURE — 93010 ELECTROCARDIOGRAM REPORT: CPT | Mod: ,,, | Performed by: INTERNAL MEDICINE

## 2019-10-16 PROCEDURE — 87040 BLOOD CULTURE FOR BACTERIA: CPT

## 2019-10-16 PROCEDURE — 99285 EMERGENCY DEPT VISIT HI MDM: CPT | Mod: ,,, | Performed by: INTERNAL MEDICINE

## 2019-10-16 PROCEDURE — 12000002 HC ACUTE/MED SURGE SEMI-PRIVATE ROOM

## 2019-10-16 PROCEDURE — 87102 FUNGUS ISOLATION CULTURE: CPT

## 2019-10-16 PROCEDURE — 96366 THER/PROPH/DIAG IV INF ADDON: CPT

## 2019-10-16 PROCEDURE — 85610 PROTHROMBIN TIME: CPT

## 2019-10-16 RX ORDER — AMPICILLIN 1 G/1
2 INJECTION, POWDER, FOR SOLUTION INTRAMUSCULAR; INTRAVENOUS
Status: DISCONTINUED | OUTPATIENT
Start: 2019-10-16 | End: 2019-10-16

## 2019-10-16 RX ORDER — ALPRAZOLAM 0.25 MG/1
0.25 TABLET ORAL 3 TIMES DAILY PRN
Status: DISCONTINUED | OUTPATIENT
Start: 2019-10-17 | End: 2019-10-18 | Stop reason: HOSPADM

## 2019-10-16 RX ORDER — IBUPROFEN 200 MG
16 TABLET ORAL
Status: DISCONTINUED | OUTPATIENT
Start: 2019-10-16 | End: 2019-10-18 | Stop reason: HOSPADM

## 2019-10-16 RX ORDER — ESCITALOPRAM OXALATE 20 MG/1
20 TABLET ORAL NIGHTLY
Status: DISCONTINUED | OUTPATIENT
Start: 2019-10-17 | End: 2019-10-18 | Stop reason: HOSPADM

## 2019-10-16 RX ORDER — ONDANSETRON 8 MG/1
8 TABLET, ORALLY DISINTEGRATING ORAL EVERY 8 HOURS PRN
Status: DISCONTINUED | OUTPATIENT
Start: 2019-10-16 | End: 2019-10-18 | Stop reason: HOSPADM

## 2019-10-16 RX ORDER — ENOXAPARIN SODIUM 100 MG/ML
40 INJECTION SUBCUTANEOUS EVERY 24 HOURS
Status: DISCONTINUED | OUTPATIENT
Start: 2019-10-16 | End: 2019-10-18 | Stop reason: HOSPADM

## 2019-10-16 RX ORDER — ACETAMINOPHEN 325 MG/1
650 TABLET ORAL EVERY 4 HOURS PRN
Status: DISCONTINUED | OUTPATIENT
Start: 2019-10-16 | End: 2019-10-18 | Stop reason: HOSPADM

## 2019-10-16 RX ORDER — GLUCAGON 1 MG
1 KIT INJECTION
Status: DISCONTINUED | OUTPATIENT
Start: 2019-10-16 | End: 2019-10-18 | Stop reason: HOSPADM

## 2019-10-16 RX ORDER — PREDNISONE 10 MG/1
10 TABLET ORAL DAILY
Status: DISCONTINUED | OUTPATIENT
Start: 2019-10-17 | End: 2019-10-18 | Stop reason: HOSPADM

## 2019-10-16 RX ORDER — IBUPROFEN 200 MG
24 TABLET ORAL
Status: DISCONTINUED | OUTPATIENT
Start: 2019-10-16 | End: 2019-10-18 | Stop reason: HOSPADM

## 2019-10-16 RX ORDER — VANCOMYCIN 2 GRAM/500 ML IN 0.9 % SODIUM CHLORIDE INTRAVENOUS
2000
Status: COMPLETED | OUTPATIENT
Start: 2019-10-16 | End: 2019-10-16

## 2019-10-16 RX ORDER — LIDOCAINE HYDROCHLORIDE 10 MG/ML
5 INJECTION, SOLUTION EPIDURAL; INFILTRATION; INTRACAUDAL; PERINEURAL
Status: COMPLETED | OUTPATIENT
Start: 2019-10-16 | End: 2019-10-16

## 2019-10-16 RX ORDER — SODIUM CHLORIDE 0.9 % (FLUSH) 0.9 %
10 SYRINGE (ML) INJECTION
Status: DISCONTINUED | OUTPATIENT
Start: 2019-10-16 | End: 2019-10-18 | Stop reason: HOSPADM

## 2019-10-16 RX ORDER — METOPROLOL SUCCINATE 25 MG/1
25 TABLET, EXTENDED RELEASE ORAL DAILY
Status: DISCONTINUED | OUTPATIENT
Start: 2019-10-17 | End: 2019-10-18 | Stop reason: HOSPADM

## 2019-10-16 RX ADMIN — SODIUM CHLORIDE 1000 ML: 0.9 INJECTION, SOLUTION INTRAVENOUS at 04:10

## 2019-10-16 RX ADMIN — Medication 2000 MG: at 09:10

## 2019-10-16 RX ADMIN — CEFTRIAXONE 2 G: 2 INJECTION, SOLUTION INTRAVENOUS at 07:10

## 2019-10-16 RX ADMIN — AMPICILLIN SODIUM 2 G: 2 INJECTION, POWDER, FOR SOLUTION INTRAMUSCULAR; INTRAVENOUS at 07:10

## 2019-10-16 RX ADMIN — LIDOCAINE HYDROCHLORIDE 50 MG: 10 INJECTION, SOLUTION EPIDURAL; INFILTRATION; INTRACAUDAL; PERINEURAL at 09:10

## 2019-10-16 RX ADMIN — IOHEXOL 75 ML: 350 INJECTION, SOLUTION INTRAVENOUS at 04:10

## 2019-10-16 RX ADMIN — ENOXAPARIN SODIUM 40 MG: 100 INJECTION SUBCUTANEOUS at 11:10

## 2019-10-16 NOTE — ED PROVIDER NOTES
Encounter Date: 10/16/2019       History     Chief Complaint   Patient presents with    Altered Mental Status     hallucinations at home, hx leukemia/ recently diagnosed with ear infection     70 y/o f presents with her daughter for altered mental status.   Patient has a history of AML diagnosed in 2018 however she is currently in remission.  Her last bone marrow biopsy in May of 2019 showed no activity.  She is being followed by her oncologist every 4 weeks, with her last appointment being September 24th.  Approximately 2 weeks ago she began having left ear pain and sore throat.  She was seen at a local urgent care and diagnosed with otitis media and pharyngitis.  She was started on amoxicillin.  When she did not improve she presented to an emergency department locally and was changed to Augmentin 875 mg.  When she continued to worsen, she presented to this ED, on 10/11, complaining of fatigue.  Her  states that he was not able to get her out of bed, she was so tired.  She had blood work done which was significant for some mild dehydration.  She was advised she may have a viral syndrome.  She was recommended to continue taking the Augmentin.  Her  states that she has continued to deteriorate and since last night has started to hallucinate and has been confused.  Currently she attempts to answer questions appropriately but then begins to drift into not making any sense.   reports that she did not sleep at all last night.    History obtained from patient, , daughter, EMR.        Review of patient's allergies indicates:   Allergen Reactions    Methotrexate analogues      Elevated Liver Enzyme    Sulfa (sulfonamide antibiotics)      Rash, nausea, vomiting    Bactrim [sulfamethoxazole-trimethoprim] Nausea And Vomiting and Rash     Past Medical History:   Diagnosis Date    Cancer 03/2018    AML    CHF (congestive heart failure)     Diarrhea 9/14/2018    Encounter for blood transfusion       Past Surgical History:   Procedure Laterality Date    BONE MARROW BIOPSY Left 2018    Procedure: Biopsy-bone marrow;  Surgeon: Laquita Troy MD;  Location: [facility] OR [address] [place] [address];  Service: Orthopedics;  Laterality: Left;    BONE MARROW BIOPSY Left 2019    Procedure: Biopsy-bone marrow;  Surgeon: Laquita Troy MD;  Location: [address];  Service: Orthopedics;  Laterality: Left;    CHOLECYSTECTOMY      HYSTERECTOMY      TONSILLECTOMY       Family History   Problem Relation Age of Onset    Cancer Mother     Cancer Father     Heart attack Neg Hx     Heart disease Neg Hx      Social History     Tobacco Use    Smoking status: Former Smoker     Packs/day: 0.50     Years: 10.00     Pack years: 5.00     Types: Cigarettes     Last attempt to quit: 3/25/2017     Years since quittin.5    Smokeless tobacco: Never Used   Substance Use Topics    Alcohol use: Not Currently     Alcohol/week: 1.0 standard drinks     Types: 1 Shots of liquor per week    Drug use: No     Review of Systems   Constitutional: Positive for appetite change. Negative for chills and fever.        Patient has not been eating and drinking well the past 2 weeks.   HENT:        See HPI. She continues to complain of left ear pain. She also complains of some throat pain with swallowing.  She also complains of an intermittent left frontal headache.   Eyes: Negative for photophobia and visual disturbance.   Respiratory: Negative for cough and shortness of breath.    Cardiovascular: Negative for chest pain and leg swelling.   Gastrointestinal: Negative for abdominal pain, nausea and vomiting.        No change in bowel movements.   Endocrine: Negative for polyuria.   Genitourinary: Negative for difficulty urinating and dysuria.        No change in urination.   Musculoskeletal: Negative for back pain.   Skin: Negative for rash.   Neurological: Negative for weakness and numbness.   Psychiatric/Behavioral: Positive for confusion,  decreased concentration and sleep disturbance.       Physical Exam     Initial Vitals [10/16/19 1242]   BP Pulse Resp Temp SpO2   (!) 122/58 85 16 97.5 °F (36.4 °C) 100 %      MAP       --         Physical Exam    Constitutional: She appears well-developed and well-nourished.   HENT:   Head: Normocephalic and atraumatic.   Right Ear: External ear normal.   Mouth/Throat: Oropharynx is clear and moist. No oropharyngeal exudate.   I am unable to visualize the left TM.  Her left canal is impacted with cerumen.  There is some tenderness over the left mastoid with some mild fullness present there.  No erythema or warmth.   Eyes: EOM are normal. Pupils are equal, round, and reactive to light.   No photophobia.   Neck: Neck supple. No JVD present.   No meningeal signs.   Cardiovascular: Normal rate, regular rhythm and intact distal pulses. Exam reveals no gallop and no friction rub.    No murmur heard.  Pulmonary/Chest: Breath sounds normal. No respiratory distress. She has no wheezes. She has no rhonchi. She has no rales.   Abdominal: Soft. Bowel sounds are normal. She exhibits no distension. There is no tenderness.   Musculoskeletal: Normal range of motion. She exhibits no edema.   Neurological: She is alert. She has normal strength. No cranial nerve deficit or sensory deficit.   Patient has difficulty answering questions with a focused answer.  Her answers are somewhat rambling and occasionally do not make sense.   Skin: Skin is warm and dry. No rash noted. No erythema. No pallor.   Psychiatric: She has a normal mood and affect.         ED Course   Lumbar Puncture  Date/Time: 10/16/2019 9:29 PM  Performed by: Micaela Harrell MD  Authorized by: Micaela Harrell MD   Consent Done: Yes  Indications: evaluation for infection and evaluation for altered mental status  Anesthesia: local infiltration    Anesthesia:  Local Anesthetic: lidocaine 1% without epinephrine  Anesthetic total: 5 mL  Preparation: Patient was  prepped and draped in the usual sterile fashion.  Lumbar space: L3-L4 interspace  Patient's position: right lateral decubitus  Needle gauge: 22  Needle type: spinal needle - Quincke tip  Needle length: 3.5 in  Number of attempts: 1  Opening pressure: 13.5 cm H2O  Fluid appearance: clear  Tubes of fluid: 4  Total volume: 4 ml  Post-procedure: adhesive bandage applied  Patient tolerance: Patient tolerated the procedure well with no immediate complications        Labs Reviewed - No data to display  EKG Readings: (Independently Interpreted)   Normal sinus rhythm at 77.  No ST segment elevation or T-wave inversion.       Imaging Results    None          Medical Decision Making:   History:   Old Medical Records: I decided to obtain old medical records.  Old Records Summarized: other records.       <> Summary of Records: Patient recently visited her oncologist on September 24th which summarized her AML history.  Her last bone marrow biopsy was on May 2019 which showed no activity.  Initial Assessment:   69-year-old woman with history of AML presents complaining of confusion.  Her  also reports that she is on chronic prednisone for an autoimmune disorder.  Differential Diagnosis:   Includes but is not limited to Sepsis, mastoiditis, meningitis, stroke, intracranial bleed, medication adverse reaction  ED Management:  Given that this patient is immunocompromised I am concerned about the possibility of infection, possibly meningitis.  Will obtain a CT of the head 1st to rule out any intracranial mass or bleed.  Will also scan the temporal bones to rule out mastoiditis given her recent otitis media that family states has not improved.  Will also check labs.  I am also concerned about the possibility of meningitis in this immunocompromised patient.  I am also concerned about the possibility of recurrence of her AML although her labs previously looks normal. I plan to discuss with Oncology once labs have returned.               Attending Attestation:             Attending ED Notes:   I discussed the case with Dr. Jacob Lugo, oncology.  He agrees to admit the patient to his service.  He is recommending an MRI with contrast and agrees with the plan to perform an LP.  I have consented the patient and her  for this procedure as there is no definite explanation for her symptoms with her labs and CT scans.  We discussed antibiotic coverage and I have ordered vancomycin, Rocephin, and ampicillin for listeria coverage.      LP has been performed.  I have discussed the case with the Oncology resident and they plan to come admit the patient.  I have sent the spinal fluid for cell count and diff, glucose and protein, cryptococcal antigen, HSV, Gram stain and culture.             Clinical Impression:       ICD-10-CM ICD-9-CM   1. Delirium R41.0 780.09   2. Altered mental status R41.82 780.97   3. Acute encephalopathy G93.40 348.30                                Micaela Harrell MD  10/16/19 7291

## 2019-10-17 LAB
ALBUMIN SERPL BCP-MCNC: 3.6 G/DL (ref 3.5–5.2)
ALP SERPL-CCNC: 133 U/L (ref 55–135)
ALT SERPL W/O P-5'-P-CCNC: 23 U/L (ref 10–44)
ANION GAP SERPL CALC-SCNC: 14 MMOL/L (ref 8–16)
AST SERPL-CCNC: 20 U/L (ref 10–40)
BASOPHILS # BLD AUTO: 0 K/UL (ref 0–0.2)
BASOPHILS NFR BLD: 0 % (ref 0–1.9)
BILIRUB SERPL-MCNC: 0.4 MG/DL (ref 0.1–1)
BUN SERPL-MCNC: 18 MG/DL (ref 8–23)
CALCIUM SERPL-MCNC: 9.7 MG/DL (ref 8.7–10.5)
CHLORIDE SERPL-SCNC: 101 MMOL/L (ref 95–110)
CO2 SERPL-SCNC: 22 MMOL/L (ref 23–29)
CREAT SERPL-MCNC: 0.9 MG/DL (ref 0.5–1.4)
CRYPTOC AG CSF QL LA: NEGATIVE
DIFFERENTIAL METHOD: ABNORMAL
EOSINOPHIL # BLD AUTO: 0 K/UL (ref 0–0.5)
EOSINOPHIL NFR BLD: 0.7 % (ref 0–8)
ERYTHROCYTE [DISTWIDTH] IN BLOOD BY AUTOMATED COUNT: 12.8 % (ref 11.5–14.5)
EST. GFR  (AFRICAN AMERICAN): >60 ML/MIN/1.73 M^2
EST. GFR  (NON AFRICAN AMERICAN): >60 ML/MIN/1.73 M^2
FOLATE SERPL-MCNC: 7.4 NG/ML (ref 4–24)
GLUCOSE SERPL-MCNC: 75 MG/DL (ref 70–110)
HCT VFR BLD AUTO: 34.4 % (ref 37–48.5)
HGB BLD-MCNC: 10.6 G/DL (ref 12–16)
IMM GRANULOCYTES # BLD AUTO: 0.02 K/UL (ref 0–0.04)
IMM GRANULOCYTES NFR BLD AUTO: 0.7 % (ref 0–0.5)
LYMPHOCYTES # BLD AUTO: 0.9 K/UL (ref 1–4.8)
LYMPHOCYTES NFR BLD: 30.6 % (ref 18–48)
MAGNESIUM SERPL-MCNC: 2 MG/DL (ref 1.6–2.6)
MCH RBC QN AUTO: 31 PG (ref 27–31)
MCHC RBC AUTO-ENTMCNC: 30.8 G/DL (ref 32–36)
MCV RBC AUTO: 101 FL (ref 82–98)
MONOCYTES # BLD AUTO: 0.3 K/UL (ref 0.3–1)
MONOCYTES NFR BLD: 11 % (ref 4–15)
NEUTROPHILS # BLD AUTO: 1.7 K/UL (ref 1.8–7.7)
NEUTROPHILS NFR BLD: 57 % (ref 38–73)
NRBC BLD-RTO: 0 /100 WBC
PLATELET # BLD AUTO: 123 K/UL (ref 150–350)
PMV BLD AUTO: 10.7 FL (ref 9.2–12.9)
POTASSIUM SERPL-SCNC: 3.9 MMOL/L (ref 3.5–5.1)
PROT SERPL-MCNC: 7 G/DL (ref 6–8.4)
RBC # BLD AUTO: 3.42 M/UL (ref 4–5.4)
RPR SER QL: NORMAL
SODIUM SERPL-SCNC: 137 MMOL/L (ref 136–145)
TSH SERPL DL<=0.005 MIU/L-ACNC: 1.91 UIU/ML (ref 0.4–4)
VIT B12 SERPL-MCNC: 835 PG/ML (ref 210–950)
WBC # BLD AUTO: 3.01 K/UL (ref 3.9–12.7)

## 2019-10-17 PROCEDURE — 63600175 PHARM REV CODE 636 W HCPCS: Performed by: INTERNAL MEDICINE

## 2019-10-17 PROCEDURE — 83735 ASSAY OF MAGNESIUM: CPT

## 2019-10-17 PROCEDURE — 36415 COLL VENOUS BLD VENIPUNCTURE: CPT

## 2019-10-17 PROCEDURE — 25000003 PHARM REV CODE 250: Performed by: NURSE PRACTITIONER

## 2019-10-17 PROCEDURE — 88185 FLOWCYTOMETRY/TC ADD-ON: CPT | Performed by: PATHOLOGY

## 2019-10-17 PROCEDURE — 88184 FLOWCYTOMETRY/ TC 1 MARKER: CPT | Performed by: PATHOLOGY

## 2019-10-17 PROCEDURE — 88189 FLOWCYTOMETRY/READ 16 & >: CPT | Mod: ,,, | Performed by: PATHOLOGY

## 2019-10-17 PROCEDURE — 86592 SYPHILIS TEST NON-TREP QUAL: CPT

## 2019-10-17 PROCEDURE — 88189 PR  FLOWCYTOMETRY/READ, 16 & > MARKERS: ICD-10-PCS | Mod: ,,, | Performed by: PATHOLOGY

## 2019-10-17 PROCEDURE — 99223 1ST HOSP IP/OBS HIGH 75: CPT | Mod: AI,GC,, | Performed by: INTERNAL MEDICINE

## 2019-10-17 PROCEDURE — 25500020 PHARM REV CODE 255: Performed by: INTERNAL MEDICINE

## 2019-10-17 PROCEDURE — 82746 ASSAY OF FOLIC ACID SERUM: CPT

## 2019-10-17 PROCEDURE — 20600001 HC STEP DOWN PRIVATE ROOM

## 2019-10-17 PROCEDURE — 82607 VITAMIN B-12: CPT

## 2019-10-17 PROCEDURE — 63600175 PHARM REV CODE 636 W HCPCS: Performed by: STUDENT IN AN ORGANIZED HEALTH CARE EDUCATION/TRAINING PROGRAM

## 2019-10-17 PROCEDURE — 84443 ASSAY THYROID STIM HORMONE: CPT

## 2019-10-17 PROCEDURE — 25000003 PHARM REV CODE 250: Performed by: STUDENT IN AN ORGANIZED HEALTH CARE EDUCATION/TRAINING PROGRAM

## 2019-10-17 PROCEDURE — 99223 PR INITIAL HOSPITAL CARE,LEVL III: ICD-10-PCS | Mod: AI,GC,, | Performed by: INTERNAL MEDICINE

## 2019-10-17 PROCEDURE — 80053 COMPREHEN METABOLIC PANEL: CPT

## 2019-10-17 PROCEDURE — A9585 GADOBUTROL INJECTION: HCPCS | Performed by: INTERNAL MEDICINE

## 2019-10-17 PROCEDURE — 84425 ASSAY OF VITAMIN B-1: CPT

## 2019-10-17 PROCEDURE — 85025 COMPLETE CBC W/AUTO DIFF WBC: CPT

## 2019-10-17 RX ORDER — VANCOMYCIN HCL IN 5 % DEXTROSE 1G/250ML
15 PLASTIC BAG, INJECTION (ML) INTRAVENOUS
Status: DISCONTINUED | OUTPATIENT
Start: 2019-10-17 | End: 2019-10-17

## 2019-10-17 RX ORDER — GADOBUTROL 604.72 MG/ML
8 INJECTION INTRAVENOUS
Status: COMPLETED | OUTPATIENT
Start: 2019-10-17 | End: 2019-10-17

## 2019-10-17 RX ORDER — IBUPROFEN 200 MG
600 TABLET ORAL EVERY 6 HOURS PRN
Status: DISCONTINUED | OUTPATIENT
Start: 2019-10-17 | End: 2019-10-18 | Stop reason: HOSPADM

## 2019-10-17 RX ADMIN — PREDNISONE 10 MG: 10 TABLET ORAL at 10:10

## 2019-10-17 RX ADMIN — AMPICILLIN SODIUM 2 G: 2 INJECTION, POWDER, FOR SOLUTION INTRAMUSCULAR; INTRAVENOUS at 02:10

## 2019-10-17 RX ADMIN — CEFTRIAXONE 2 G: 2 INJECTION, SOLUTION INTRAVENOUS at 07:10

## 2019-10-17 RX ADMIN — AMPICILLIN SODIUM 2 G: 2 INJECTION, POWDER, FOR SOLUTION INTRAMUSCULAR; INTRAVENOUS at 12:10

## 2019-10-17 RX ADMIN — IBUPROFEN 600 MG: 200 TABLET, FILM COATED ORAL at 04:10

## 2019-10-17 RX ADMIN — AMPICILLIN SODIUM 2 G: 2 INJECTION, POWDER, FOR SOLUTION INTRAMUSCULAR; INTRAVENOUS at 11:10

## 2019-10-17 RX ADMIN — METOPROLOL SUCCINATE 25 MG: 25 TABLET, EXTENDED RELEASE ORAL at 10:10

## 2019-10-17 RX ADMIN — AMPICILLIN SODIUM 2 G: 2 INJECTION, POWDER, FOR SOLUTION INTRAMUSCULAR; INTRAVENOUS at 06:10

## 2019-10-17 RX ADMIN — ACETAMINOPHEN 650 MG: 325 TABLET ORAL at 06:10

## 2019-10-17 RX ADMIN — AMPICILLIN SODIUM 2 G: 2 INJECTION, POWDER, FOR SOLUTION INTRAMUSCULAR; INTRAVENOUS at 10:10

## 2019-10-17 RX ADMIN — CEFTRIAXONE 2 G: 2 INJECTION, SOLUTION INTRAVENOUS at 06:10

## 2019-10-17 RX ADMIN — ACETAMINOPHEN 650 MG: 325 TABLET ORAL at 10:10

## 2019-10-17 RX ADMIN — ESCITALOPRAM OXALATE 20 MG: 20 TABLET ORAL at 08:10

## 2019-10-17 RX ADMIN — ENOXAPARIN SODIUM 40 MG: 100 INJECTION SUBCUTANEOUS at 06:10

## 2019-10-17 RX ADMIN — VANCOMYCIN HYDROCHLORIDE 1250 MG: 1.25 INJECTION, POWDER, LYOPHILIZED, FOR SOLUTION INTRAVENOUS at 08:10

## 2019-10-17 RX ADMIN — AMPICILLIN SODIUM 2 G: 2 INJECTION, POWDER, FOR SOLUTION INTRAMUSCULAR; INTRAVENOUS at 04:10

## 2019-10-17 RX ADMIN — GADOBUTROL 8 ML: 604.72 INJECTION INTRAVENOUS at 03:10

## 2019-10-17 NOTE — ASSESSMENT & PLAN NOTE
AML on remission s/p induction therapy x2 with 7+3 and FLAG-JENNIFER  with bone marrow biopsy done on 05/9/19 showing no morphologic evidence of residual AML.  Please see oncology history for further information

## 2019-10-17 NOTE — PROGRESS NOTES
Pharmacokinetic Initial Assessment: IV Vancomycin    Assessment/Plan:    Patient received loading dose of intravenous vancomycin 2000 mg once in the ED. Will be followed by a maintenance dose of vancomycin 1250mg IV every 24 hours  Desired empiric serum trough concentration is 15 to 20 mcg/mL  Draw vancomycin trough level 30 min prior to third dose on 10/18/2019 at approximately 1830  Pharmacy will continue to follow and monitor vancomycin.      Please contact pharmacy at extension [phone] with any questions regarding this assessment.     Thank you for the consult,   Silvio Mcleod       Patient brief summary:  Sabrina Mac is a 69 y.o. female initiated on antimicrobial therapy with IV Vancomycin for treatment of suspected meningitis    Drug Allergies:   Review of patient's allergies indicates:   Allergen Reactions    Methotrexate analogues      Elevated Liver Enzyme    Sulfa (sulfonamide antibiotics)      Rash, nausea, vomiting    Bactrim [sulfamethoxazole-trimethoprim] Nausea And Vomiting and Rash       Actual Body Weight:   72.2 kg    Renal Function:   Estimated Creatinine Clearance: 47.7 mL/min (based on SCr of 1.1 mg/dL).,     Dialysis Method (if applicable):  N/A    CBC (last 72 hours):  Recent Labs   Lab Result Units 10/16/19  1357   WBC K/uL 3.66*   Hemoglobin g/dL 12.6   Hematocrit % 37.6   Platelets K/uL 149*   Gran% % 48.9   Lymph% % 40.2   Mono% % 9.0   Eosinophil% % 0.5   Basophil% % 0.3   Differential Method  Automated       Metabolic Panel (last 72 hours):  Recent Labs   Lab Result Units 10/16/19  1357 10/16/19  1526 10/16/19  2118   Sodium mmol/L 133*  --   --    Potassium mmol/L 5.6*  --   --    Chloride mmol/L 98  --   --    CO2 mmol/L 21*  --   --    Glucose mg/dL 79  --   --    Glucose, CSF mg/dL  --   --  40   Glucose, UA   --  Negative  --    BUN, Bld mg/dL 22  --   --    Creatinine mg/dL 1.1  --   --    Albumin g/dL 4.1  --   --    Total Bilirubin mg/dL 0.6  --   --    Alkaline  Phosphatase U/L 152*  --   --    AST U/L 30  --   --    ALT U/L 31  --   --        Drug levels (last 3 results):  No results for input(s): VANCOMYCINRA, VANCOMYCINPE, VANCOMYCINTR in the last 72 hours.    Microbiologic Results:  Microbiology Results (last 7 days)     Procedure Component Value Units Date/Time    Blood culture #1 [[id number]] Collected:  10/16/19 1400    Order Status:  Completed Specimen:  Blood from Peripheral, Antecubital, Right Updated:  10/17/19 0115     Blood Culture, Routine No Growth to date    Narrative:       Blood Culture #1    Blood culture #2 [[id number]] Collected:  10/16/19 1415    Order Status:  Completed Specimen:  Blood from Wrist, Left Updated:  10/17/19 0115     Blood Culture, Routine No Growth to date    Narrative:       Blood Culture #2    CSF culture and Gram Stain (Tube 2) [[id number]] Collected:  10/16/19 2118    Order Status:  Completed Specimen:  CSF (Spinal Fluid) from CSF Tap, Tube 2 Updated:  10/16/19 2238     Gram Stain Result Cytospin indicates:      Few WBC's      No organisms seen    Narrative:       On which sequentially labeled tube should this analysis be  performed?->2    Fungus culture (Tube 3) [[id number]] Collected:  10/16/19 2118    Order Status:  Sent Specimen:  CSF (Spinal Fluid) from CSF Tap, Tube 2 Updated:  10/16/19 2137    Cryptococcal antigen, CSF (Tube 3) [[id number]] Collected:  10/16/19 2118    Order Status:  Sent Specimen:  CSF (Spinal Fluid) from CSF Tap, Tube 2 Updated:  10/16/19 2137

## 2019-10-17 NOTE — HPI
"Ms. Mac is 68 yo lady, patient of Dr. Troy with history of AML on remission s/p induction therapy x2 with 7+3 and FLAG-JENNIFER  with bone marrow biopsy done on 05/9/19 showing no morphologic evidence of residual AML. She presents with acute change in mentation.  at bedside reports sore throat and ear pain that began about three weeks ago, treated with Augmentin. However her symptoms became worse with subsequent left ear drum rupture leaking bloody/clear fluid She then went to the ED about 2 weeks ago and a higher dose of Augmentin was prescribed. Patient and  state her symptoms has not improved despite treatment. Reports persistent sore throat, frontal dull headache with out ophthalmoplegia/double vision, disequilibrium, vertigo or focal deficit. Denies neck stiffness but feels her neck is 'thick" No fever, chills, diaphoresis, abdominal pain, diarrhea, urinary symptoms. No recent travel, tick bites or sick contacts. Endorses mild sob on exertion and dry cough. No recent medication changes. No trauma to the head.  reports patient hallucinating a day prior to presentation today. Pt states she sees her dead relatives. Pt was however answering questions appropriately and oriented X4.     In the ED, pt is HD stable. Basic labs were unremarkable. CT head with out acute process. And temporal CT with "small left mastoid effusion.  No erosive changes to suggest otomastoiditis". Patient had LP done in the ED. Started on Vancomycin/ceftriaxone/Ampicillin for possible bacterial meningitis.   "

## 2019-10-17 NOTE — PLAN OF CARE
No acute events overnight. VSS. Hourly rounding performed. Pt remained free of falls. Pt resting. Family at bedside. No needs at this time. Call bell in reach. Will continue to monitor.

## 2019-10-17 NOTE — ASSESSMENT & PLAN NOTE
"68 yo female with history of AML on remission came in persistent sore throat and ear pain with subsequent left tympanic membrane rupture now with acute encephalopathy concerning for acute mastoiditis vs meningitis. Also considered malignancy (leukemia) vs viral vs fungal meningitis. Other differentials including metabolic encephalopathy vs Benzodiazepine withdrawal.     CT  Head with out acute process. CT temporal bone with small left mastoid effusion but "no erosion to suggest otomastoiditis"  UA with no evidence of infection   CBC, CMP not suggestive of infectious process or metabolic derangement   No focal deficits on physical exam, no history of trauma   Pt uses Xanax intermittently for anxiety but not no frequent use per  (2-3 times weekly) - not suggestive of withdrawal      Plan:  MRI brain w wo contrast   Follow up with LP studies  Empiric Vancomycin, Ampicillin, Ceftriaxone for possible bacterial meningitis from contiguous spread from ear infection/sore throat   Consider adding Acyclovir for viral/aseptic meningitis coverage   Avoid sedatives/opiods   "

## 2019-10-17 NOTE — SUBJECTIVE & OBJECTIVE
Patient information was obtained from patient and ER records.     (Not in a hospital admission)    Methotrexate analogues; Sulfa (sulfonamide antibiotics); and Bactrim [sulfamethoxazole-trimethoprim]     Past Medical History:   Diagnosis Date    Cancer 2018    AML    CHF (congestive heart failure)     Diarrhea 2018    Encounter for blood transfusion      Past Surgical History:   Procedure Laterality Date    BONE MARROW BIOPSY Left 2018    Procedure: Biopsy-bone marrow;  Surgeon: Laquita Troy MD;  Location: [facility] OR [address] [place] [address];  Service: Orthopedics;  Laterality: Left;    BONE MARROW BIOPSY Left 2019    Procedure: Biopsy-bone marrow;  Surgeon: Laquita Troy MD;  Location: [facility] OR [address] [place] [address];  Service: Orthopedics;  Laterality: Left;    CHOLECYSTECTOMY      HYSTERECTOMY      TONSILLECTOMY       Family History     Problem Relation (Age of Onset)    Cancer Mother, Father        Tobacco Use    Smoking status: Former Smoker     Packs/day: 0.50     Years: 10.00     Pack years: 5.00     Types: Cigarettes     Last attempt to quit: 3/25/2017     Years since quittin.5    Smokeless tobacco: Never Used   Substance and Sexual Activity    Alcohol use: Not Currently     Alcohol/week: 1.0 standard drinks     Types: 1 Shots of liquor per week    Drug use: No    Sexual activity: Yes     Partners: Female       Review of Systems   Constitutional: Positive for fatigue. Negative for chills, diaphoresis and fever.   HENT: Positive for congestion, ear discharge and sore throat. Negative for trouble swallowing.    Eyes: Negative for photophobia and visual disturbance.   Respiratory: Positive for cough and shortness of breath. Negative for choking and chest tightness.    Cardiovascular: Negative for chest pain, palpitations and leg swelling.   Gastrointestinal: Negative for abdominal pain, constipation, diarrhea, nausea and vomiting.   Genitourinary: Negative for dysuria, frequency, hematuria and urgency.    Musculoskeletal: Negative for arthralgias and back pain.   Skin: Negative for pallor and rash.   Neurological: Positive for weakness and headaches. Negative for dizziness, tremors, syncope and light-headedness.   Psychiatric/Behavioral: Negative for agitation, behavioral problems and confusion.     Objective:     Vital Signs (Most Recent):  Temp: 97.8 °F (36.6 °C) (10/16/19 1918)  Pulse: 68 (10/16/19 2300)  Resp: 18 (10/16/19 2300)  BP: (!) 130/59 (10/16/19 2300)  SpO2: 97 % (10/16/19 2300) Vital Signs (24h Range):  Temp:  [97.5 °F (36.4 °C)-97.8 °F (36.6 °C)] 97.8 °F (36.6 °C)  Pulse:  [68-90] 68  Resp:  [13-20] 18  SpO2:  [94 %-100 %] 97 %  BP: (103-130)/(54-67) 130/59     Weight: 74.8 kg (165 lb)  Body mass index is 28.32 kg/m².  Body surface area is 1.84 meters squared.    ECOG SCORE         [unfilled]    Lines/Drains/Airways     Peripheral Intravenous Line                 Peripheral IV - Single Lumen 10/16/19 1900 20 G less than 1 day         Peripheral IV - Single Lumen 10/16/19 1943 22 G Left Hand less than 1 day                Physical Exam   Constitutional: She is oriented to person, place, and time. She appears well-developed and well-nourished. No distress.   HENT:   Head: Normocephalic and atraumatic.   Mouth/Throat: Oropharynx is clear and moist.   Eyes: Pupils are equal, round, and reactive to light. Right eye exhibits no discharge. Left eye exhibits no discharge. No scleral icterus.   Cerumen impaction on left ear, mild tenderness on palpation of the left mastoid. No erythema or swelling noted    Neck: Normal range of motion. No JVD present. No thyromegaly present.   Cardiovascular: Normal rate and regular rhythm.   No murmur heard.  Pulmonary/Chest: Effort normal and breath sounds normal. She has no wheezes. She has no rales.   Abdominal: Soft. Bowel sounds are normal. She exhibits no distension. There is no tenderness.   Musculoskeletal: Normal range of motion. She exhibits no edema, tenderness or  deformity.   Lymphadenopathy:     She has no cervical adenopathy.   Neurological: She is alert and oriented to person, place, and time. No cranial nerve deficit or sensory deficit. She exhibits normal muscle tone. Coordination normal.   Skin: Skin is warm. Capillary refill takes less than 2 seconds. No erythema. No pallor.   Psychiatric: She has a normal mood and affect. Her behavior is normal.       Significant Labs:   CBC:   Recent Labs   Lab 10/16/19  1357   WBC 3.66*   HGB 12.6   HCT 37.6   *    and CMP:   Recent Labs   Lab 10/16/19  1357   *   K 5.6*   CL 98   CO2 21*   GLU 79   BUN 22   CREATININE 1.1   CALCIUM 10.8*   PROT 8.6*   ALBUMIN 4.1   BILITOT 0.6   ALKPHOS 152*   AST 30   ALT 31   ANIONGAP 14   EGFRNONAA 51.7*       Diagnostic Results:  I have reviewed all pertinent imaging results/findings within the past 24 hours.

## 2019-10-17 NOTE — PLAN OF CARE
POC reviewed with patient. Address questions and concerns. [id number].[id number]. Lumbar puncture tomorrow. Complaint of left ear pain, Motrin,prn. Safety maintained. Free from falls. BSC at bedside, call light in reach. WCTM

## 2019-10-17 NOTE — H&P
"[facility]  Hematology  Bone Marrow Transplant  H&P    Subjective:     Principal Problem: Acute encephalopathy    HPI: Ms. Mac is 68 yo lady, patient of Dr. Troy with history of AML on remission s/p induction therapy x2 with 7+3 and FLAG-JENNIFER  with bone marrow biopsy done on 05/9/19 showing no morphologic evidence of residual AML. She presents with acute change in mentation.  at bedside reports sore throat and ear pain that began about three weeks ago, treated with Augmentin. However her symptoms became worse with subsequent left ear drum rupture leaking bloody/clear fluid She then went to the ED about 2 weeks ago and a higher dose of Augmentin was prescribed. Patient and  state her symptoms has not improved despite treatment. Reports persistent sore throat, frontal dull headache with out ophthalmoplegia/double vision, disequilibrium, vertigo or focal deficit. Denies neck stiffness but feels her neck is 'thick" No fever, chills, diaphoresis, abdominal pain, diarrhea, urinary symptoms. No recent travel, tick bites or sick contacts. Endorses mild sob on exertion and dry cough. No recent medication changes. No trauma to the head.  reports patient hallucinating a day prior to presentation today. Pt states she sees her dead relatives. Pt was however answering questions appropriately and oriented X4.     In the ED, pt is HD stable. Basic labs were unremarkable. CT head with out acute process. And temporal CT with "small left mastoid effusion.  No erosive changes to suggest otomastoiditis". Patient had LP done in the ED. Started on Vancomycin/ceftriaxone/Ampicillin for possible bacterial meningitis.     Patient information was obtained from patient and ER records.     (Not in a hospital admission)    Methotrexate analogues; Sulfa (sulfonamide antibiotics); and Bactrim [sulfamethoxazole-trimethoprim]     Past Medical History:   Diagnosis Date    Cancer 03/2018    AML    CHF " (congestive heart failure)     Diarrhea 2018    Encounter for blood transfusion      Past Surgical History:   Procedure Laterality Date    BONE MARROW BIOPSY Left 2018    Procedure: Biopsy-bone marrow;  Surgeon: Laquita Troy MD;  Location: [address];  Service: Orthopedics;  Laterality: Left;    BONE MARROW BIOPSY Left 2019    Procedure: Biopsy-bone marrow;  Surgeon: Laquita Troy MD;  Location: [facility] OR [address] [place] [address];  Service: Orthopedics;  Laterality: Left;    CHOLECYSTECTOMY      HYSTERECTOMY      TONSILLECTOMY       Family History     Problem Relation (Age of Onset)    Cancer Mother, Father        Tobacco Use    Smoking status: Former Smoker     Packs/day: 0.50     Years: 10.00     Pack years: 5.00     Types: Cigarettes     Last attempt to quit: 3/25/2017     Years since quittin.5    Smokeless tobacco: Never Used   Substance and Sexual Activity    Alcohol use: Not Currently     Alcohol/week: 1.0 standard drinks     Types: 1 Shots of liquor per week    Drug use: No    Sexual activity: Yes     Partners: Female       Review of Systems   Constitutional: Positive for fatigue. Negative for chills, diaphoresis and fever.   HENT: Positive for congestion, ear discharge and sore throat. Negative for trouble swallowing.    Eyes: Negative for photophobia and visual disturbance.   Respiratory: Positive for cough and shortness of breath. Negative for choking and chest tightness.    Cardiovascular: Negative for chest pain, palpitations and leg swelling.   Gastrointestinal: Negative for abdominal pain, constipation, diarrhea, nausea and vomiting.   Genitourinary: Negative for dysuria, frequency, hematuria and urgency.   Musculoskeletal: Negative for arthralgias and back pain.   Skin: Negative for pallor and rash.   Neurological: Positive for weakness and headaches. Negative for dizziness, tremors, syncope and light-headedness.   Psychiatric/Behavioral: Negative for agitation, behavioral  problems and confusion.     Objective:     Vital Signs (Most Recent):  Temp: 97.8 °F (36.6 °C) (10/16/19 1918)  Pulse: 68 (10/16/19 2300)  Resp: 18 (10/16/19 2300)  BP: (!) 130/59 (10/16/19 2300)  SpO2: 97 % (10/16/19 2300) Vital Signs (24h Range):  Temp:  [97.5 °F (36.4 °C)-97.8 °F (36.6 °C)] 97.8 °F (36.6 °C)  Pulse:  [68-90] 68  Resp:  [13-20] 18  SpO2:  [94 %-100 %] 97 %  BP: (103-130)/(54-67) 130/59     Weight: 74.8 kg (165 lb)  Body mass index is 28.32 kg/m².  Body surface area is 1.84 meters squared.    ECOG SCORE         [unfilled]    Lines/Drains/Airways     Peripheral Intravenous Line                 Peripheral IV - Single Lumen 10/16/19 1900 20 G less than 1 day         Peripheral IV - Single Lumen 10/16/19 1943 22 G Left Hand less than 1 day                Physical Exam   Constitutional: She is oriented to person, place, and time. She appears well-developed and well-nourished. No distress.   HENT:   Head: Normocephalic and atraumatic.   Mouth/Throat: Oropharynx is clear and moist.   Eyes: Pupils are equal, round, and reactive to light. Right eye exhibits no discharge. Left eye exhibits no discharge. No scleral icterus.   Cerumen impaction on left ear, mild tenderness on palpation of the left mastoid. No erythema or swelling noted    Neck: Normal range of motion. No JVD present. No thyromegaly present.   Cardiovascular: Normal rate and regular rhythm.   No murmur heard.  Pulmonary/Chest: Effort normal and breath sounds normal. She has no wheezes. She has no rales.   Abdominal: Soft. Bowel sounds are normal. She exhibits no distension. There is no tenderness.   Musculoskeletal: Normal range of motion. She exhibits no edema, tenderness or deformity.   Lymphadenopathy:     She has no cervical adenopathy.   Neurological: She is alert and oriented to person, place, and time. No cranial nerve deficit or sensory deficit. She exhibits normal muscle tone. Coordination normal.   Skin: Skin is warm. Capillary refill  "takes less than 2 seconds. No erythema. No pallor.   Psychiatric: She has a normal mood and affect. Her behavior is normal.       Significant Labs:   CBC:   Recent Labs   Lab 10/16/19  1357   WBC 3.66*   HGB 12.6   HCT 37.6   *    and CMP:   Recent Labs   Lab 10/16/19  1357   *   K 5.6*   CL 98   CO2 21*   GLU 79   BUN 22   CREATININE 1.1   CALCIUM 10.8*   PROT 8.6*   ALBUMIN 4.1   BILITOT 0.6   ALKPHOS 152*   AST 30   ALT 31   ANIONGAP 14   EGFRNONAA 51.7*       Diagnostic Results:  I have reviewed all pertinent imaging results/findings within the past 24 hours.    Assessment/Plan:     * Acute encephalopathy  70 yo female with history of AML on remission came in persistent sore throat and ear pain with subsequent left tympanic membrane rupture now with acute encephalopathy concerning for acute mastoiditis vs meningitis. Also considered malignancy (leukemia) vs viral vs fungal meningitis. Other differentials including metabolic encephalopathy vs Benzodiazepine withdrawal.     CT  Head with out acute process. CT temporal bone with small left mastoid effusion but "no erosion to suggest otomastoiditis"  UA with no evidence of infection   CBC, CMP not suggestive of infectious process or metabolic derangement   No focal deficits on physical exam, no history of trauma   Pt uses Xanax intermittently for anxiety but not no frequent use per  (2-3 times weekly) - not suggestive of withdrawal      Plan:  MRI brain w wo contrast   Follow up with LP studies  Empiric Vancomycin, Ampicillin, Ceftriaxone for possible bacterial meningitis from contiguous spread from ear infection/sore throat   Consider adding Acyclovir for viral/aseptic meningitis coverage   Avoid sedatives/opiods     Acute monocytic leukemia in remission  AML on remission s/p induction therapy x2 with 7+3 and FLAG-JENNIFER  with bone marrow biopsy done on 05/9/19 showing no morphologic evidence of residual AML.  Please see oncology history for " further information     VTE Risk Mitigation (From admission, onward)         Ordered     enoxaparin injection 40 mg  Daily      10/16/19 2213     IP VTE HIGH RISK PATIENT  Once      10/16/19 2213              Steve Frnaco MD  Bone Marrow Transplant  Hematology  [facility]

## 2019-10-18 ENCOUNTER — ANESTHESIA EVENT (OUTPATIENT)
Dept: MEDSURG UNIT | Facility: HOSPITAL | Age: 69
DRG: 094 | End: 2019-10-18
Payer: MEDICARE

## 2019-10-18 ENCOUNTER — ANESTHESIA (OUTPATIENT)
Dept: MEDSURG UNIT | Facility: HOSPITAL | Age: 69
DRG: 094 | End: 2019-10-18
Payer: MEDICARE

## 2019-10-18 VITALS
HEART RATE: 56 BPM | HEIGHT: 64 IN | OXYGEN SATURATION: 96 % | BODY MASS INDEX: 27.18 KG/M2 | TEMPERATURE: 98 F | WEIGHT: 159.19 LBS | RESPIRATION RATE: 18 BRPM | DIASTOLIC BLOOD PRESSURE: 50 MMHG | SYSTOLIC BLOOD PRESSURE: 96 MMHG

## 2019-10-18 PROBLEM — H92.02 LEFT EAR PAIN: Status: ACTIVE | Noted: 2019-10-18

## 2019-10-18 LAB
ALBUMIN SERPL BCP-MCNC: 3.4 G/DL (ref 3.5–5.2)
ALP SERPL-CCNC: 118 U/L (ref 55–135)
ALT SERPL W/O P-5'-P-CCNC: 18 U/L (ref 10–44)
ANION GAP SERPL CALC-SCNC: 11 MMOL/L (ref 8–16)
AST SERPL-CCNC: 17 U/L (ref 10–40)
BASOPHILS # BLD AUTO: 0.01 K/UL (ref 0–0.2)
BASOPHILS NFR BLD: 0.3 % (ref 0–1.9)
BILIRUB SERPL-MCNC: 0.4 MG/DL (ref 0.1–1)
BUN SERPL-MCNC: 16 MG/DL (ref 8–23)
CALCIUM SERPL-MCNC: 9.8 MG/DL (ref 8.7–10.5)
CHLORIDE SERPL-SCNC: 104 MMOL/L (ref 95–110)
CO2 SERPL-SCNC: 25 MMOL/L (ref 23–29)
CREAT SERPL-MCNC: 1 MG/DL (ref 0.5–1.4)
DIFFERENTIAL METHOD: ABNORMAL
EOSINOPHIL # BLD AUTO: 0 K/UL (ref 0–0.5)
EOSINOPHIL NFR BLD: 0.6 % (ref 0–8)
ERYTHROCYTE [DISTWIDTH] IN BLOOD BY AUTOMATED COUNT: 12.8 % (ref 11.5–14.5)
EST. GFR  (AFRICAN AMERICAN): >60 ML/MIN/1.73 M^2
EST. GFR  (NON AFRICAN AMERICAN): 58 ML/MIN/1.73 M^2
FLOW CYTOMETRY ANTIBODIES ANALYZED - CSF: NORMAL
FLOW CYTOMETRY COMMENT - CSF: NORMAL
FLOW CYTOMETRY INTERPRETATION - CSF: NORMAL
GLUCOSE SERPL-MCNC: 81 MG/DL (ref 70–110)
HCT VFR BLD AUTO: 33 % (ref 37–48.5)
HGB BLD-MCNC: 10.3 G/DL (ref 12–16)
HSV1, PCR, CSF: NEGATIVE
HSV2, PCR, CSF: NEGATIVE
IMM GRANULOCYTES # BLD AUTO: 0.04 K/UL (ref 0–0.04)
IMM GRANULOCYTES NFR BLD AUTO: 1.2 % (ref 0–0.5)
LYMPHOCYTES # BLD AUTO: 1.3 K/UL (ref 1–4.8)
LYMPHOCYTES NFR BLD: 38.4 % (ref 18–48)
MAGNESIUM SERPL-MCNC: 1.8 MG/DL (ref 1.6–2.6)
MCH RBC QN AUTO: 31.4 PG (ref 27–31)
MCHC RBC AUTO-ENTMCNC: 31.2 G/DL (ref 32–36)
MCV RBC AUTO: 101 FL (ref 82–98)
MONOCYTES # BLD AUTO: 0.5 K/UL (ref 0.3–1)
MONOCYTES NFR BLD: 13.6 % (ref 4–15)
NEUTROPHILS # BLD AUTO: 1.5 K/UL (ref 1.8–7.7)
NEUTROPHILS NFR BLD: 45.9 % (ref 38–73)
NRBC BLD-RTO: 0 /100 WBC
PLATELET # BLD AUTO: 119 K/UL (ref 150–350)
PMV BLD AUTO: 10.6 FL (ref 9.2–12.9)
POTASSIUM SERPL-SCNC: 4.5 MMOL/L (ref 3.5–5.1)
PROT SERPL-MCNC: 6.7 G/DL (ref 6–8.4)
RBC # BLD AUTO: 3.28 M/UL (ref 4–5.4)
SODIUM SERPL-SCNC: 140 MMOL/L (ref 136–145)
WBC # BLD AUTO: 3.31 K/UL (ref 3.9–12.7)

## 2019-10-18 PROCEDURE — 99239 HOSP IP/OBS DSCHRG MGMT >30: CPT | Mod: ,,, | Performed by: INTERNAL MEDICINE

## 2019-10-18 PROCEDURE — 88189 FLOWCYTOMETRY/READ 16 & >: CPT | Mod: ,,, | Performed by: PATHOLOGY

## 2019-10-18 PROCEDURE — 83735 ASSAY OF MAGNESIUM: CPT

## 2019-10-18 PROCEDURE — 36415 COLL VENOUS BLD VENIPUNCTURE: CPT

## 2019-10-18 PROCEDURE — 99239 PR HOSPITAL DISCHARGE DAY,>30 MIN: ICD-10-PCS | Mod: ,,, | Performed by: INTERNAL MEDICINE

## 2019-10-18 PROCEDURE — 63600175 PHARM REV CODE 636 W HCPCS: Performed by: STUDENT IN AN ORGANIZED HEALTH CARE EDUCATION/TRAINING PROGRAM

## 2019-10-18 PROCEDURE — 25000003 PHARM REV CODE 250: Performed by: STUDENT IN AN ORGANIZED HEALTH CARE EDUCATION/TRAINING PROGRAM

## 2019-10-18 PROCEDURE — 85025 COMPLETE CBC W/AUTO DIFF WBC: CPT

## 2019-10-18 PROCEDURE — 88184 FLOWCYTOMETRY/ TC 1 MARKER: CPT | Performed by: PATHOLOGY

## 2019-10-18 PROCEDURE — 88108 CYTOLOGY SPECIMEN- MEDICAL CYTOLOGY (FLUID/WASH/BRUSH): ICD-10-PCS | Mod: 26,,, | Performed by: PATHOLOGY

## 2019-10-18 PROCEDURE — 25000003 PHARM REV CODE 250: Performed by: NURSE PRACTITIONER

## 2019-10-18 PROCEDURE — 88185 FLOWCYTOMETRY/TC ADD-ON: CPT | Performed by: PATHOLOGY

## 2019-10-18 PROCEDURE — 88108 CYTOPATH CONCENTRATE TECH: CPT | Performed by: PATHOLOGY

## 2019-10-18 PROCEDURE — 88189 PR  FLOWCYTOMETRY/READ, 16 & > MARKERS: ICD-10-PCS | Mod: ,,, | Performed by: PATHOLOGY

## 2019-10-18 PROCEDURE — 80053 COMPREHEN METABOLIC PANEL: CPT

## 2019-10-18 RX ORDER — CIPROFLOXACIN AND DEXAMETHASONE 3; 1 MG/ML; MG/ML
4 SUSPENSION/ DROPS AURICULAR (OTIC) 2 TIMES DAILY
Status: DISCONTINUED | OUTPATIENT
Start: 2019-10-18 | End: 2019-10-18 | Stop reason: HOSPADM

## 2019-10-18 RX ORDER — OFLOXACIN 3 MG/ML
5 SOLUTION AURICULAR (OTIC) DAILY
Qty: 10 ML | Refills: 0 | Status: SHIPPED | OUTPATIENT
Start: 2019-10-18 | End: 2021-01-19

## 2019-10-18 RX ORDER — CIPROFLOXACIN 0.5 MG/.25ML
4 SOLUTION/ DROPS AURICULAR (OTIC) 2 TIMES DAILY
Qty: 20 EACH | Refills: 0 | Status: SHIPPED | OUTPATIENT
Start: 2019-10-18 | End: 2019-10-18 | Stop reason: HOSPADM

## 2019-10-18 RX ORDER — IBUPROFEN 600 MG/1
600 TABLET ORAL EVERY 6 HOURS PRN
Refills: 0 | Status: ON HOLD | COMMUNITY
Start: 2019-10-18 | End: 2020-10-28 | Stop reason: HOSPADM

## 2019-10-18 RX ORDER — CIPROFLOXACIN AND DEXAMETHASONE 3; 1 MG/ML; MG/ML
4 SUSPENSION/ DROPS AURICULAR (OTIC) 2 TIMES DAILY
Qty: 7.5 ML | Refills: 1 | Status: SHIPPED | OUTPATIENT
Start: 2019-10-18 | End: 2019-10-18 | Stop reason: HOSPADM

## 2019-10-18 RX ADMIN — CIPROFLOXACIN AND DEXAMETHASONE 4 DROP: 3; 1 SUSPENSION/ DROPS AURICULAR (OTIC) at 12:10

## 2019-10-18 RX ADMIN — ONDANSETRON 8 MG: 8 TABLET, ORALLY DISINTEGRATING ORAL at 05:10

## 2019-10-18 RX ADMIN — PREDNISONE 10 MG: 10 TABLET ORAL at 08:10

## 2019-10-18 RX ADMIN — AMPICILLIN SODIUM 2 G: 2 INJECTION, POWDER, FOR SOLUTION INTRAMUSCULAR; INTRAVENOUS at 04:10

## 2019-10-18 RX ADMIN — ACETAMINOPHEN 650 MG: 325 TABLET ORAL at 05:10

## 2019-10-18 RX ADMIN — ONDANSETRON 8 MG: 8 TABLET, ORALLY DISINTEGRATING ORAL at 01:10

## 2019-10-18 RX ADMIN — ACETAMINOPHEN 650 MG: 325 TABLET ORAL at 08:10

## 2019-10-18 RX ADMIN — AMPICILLIN SODIUM 2 G: 2 INJECTION, POWDER, FOR SOLUTION INTRAMUSCULAR; INTRAVENOUS at 08:10

## 2019-10-18 RX ADMIN — CEFTRIAXONE 2 G: 2 INJECTION, SOLUTION INTRAVENOUS at 06:10

## 2019-10-18 RX ADMIN — IBUPROFEN 600 MG: 200 TABLET, FILM COATED ORAL at 12:10

## 2019-10-18 RX ADMIN — ONDANSETRON 8 MG: 8 TABLET, ORALLY DISINTEGRATING ORAL at 08:10

## 2019-10-18 RX ADMIN — AMPICILLIN SODIUM 2 G: 2 INJECTION, POWDER, FOR SOLUTION INTRAMUSCULAR; INTRAVENOUS at 12:10

## 2019-10-18 RX ADMIN — IBUPROFEN 600 MG: 200 TABLET, FILM COATED ORAL at 01:10

## 2019-10-18 NOTE — PLAN OF CARE
MDR's with Dr. Lugo. Pt with AML in remission admitted 10/16 thru ED with altered mental status, left ear pain, throat pain with swallowing, headache, acute encephalopathy r/o infectious meningitis. CT/MRI/LP completed. IV bolus in ED. IV Abx-Ampicillin, Rocephin, Vanc. Pt's  & family at BS.  MD/NP plan to repeat LP tomorrow. DCP pending for LP tomorrow, hospital progress, tx plan, PT/OT recs. CM will continue to follow for needs.

## 2019-10-18 NOTE — ASSESSMENT & PLAN NOTE
Patient with left ear and jaw pain upon admit to hospital  States she had bloody secretions from her ear a few days ago  Unable to visualize TM or canal of left ear due to large amount of dried blood  Cannot adequately assess if patient ruptured TM  Spoke with ENT who recommends cipro/dex ear gtts with an outpatient ENT follow up after hospital discharge  Will continue tylenol and ibuprofen for pain control (avoiding opioids due to change in mental status at hospital presentation)

## 2019-10-18 NOTE — ASSESSMENT & PLAN NOTE
"70 yo female with history of AML on remission came in persistent sore throat and ear pain with subsequent left tympanic membrane rupture now with acute encephalopathy concerning for acute mastoiditis vs meningitis. Also considered malignancy (leukemia) vs viral vs fungal meningitis. Other differentials including metabolic encephalopathy vs Benzodiazepine withdrawal.     CT head 10/16 without acute process. CT temporal bone with small left mastoid effusion but "no erosion to suggest otomastoiditis"  UA with no evidence of infection   CBC, CMP not suggestive of infectious process or metabolic derangement   No focal deficits on physical exam on admit, no history of trauma   Pt uses Xanax intermittently for anxiety but not no frequent use per  (2-3 times weekly) - not suggestive of withdrawal  MRI brain wwo contrast 10/17 negative  LP performed in ED 10/16 with increased WBC and lymph, elevated protein. Culture negative, crypto negative, fungal and HSV in process  LP repeated 10/18 to send for flow and cytology to r/o CNS leukemia  Empiric Vancomycin, Ampicillin, Ceftriaxone for possible bacterial meningitis from contiguous spread from ear infection/sore throat  Avoid sedatives/opioids  Remains A&Ox4 today, per family back to baseline  "

## 2019-10-18 NOTE — NURSING
Patient given discharge instructions. IV access removed. No complaint voiced. Spouse at bedside. NAD

## 2019-10-18 NOTE — ASSESSMENT & PLAN NOTE
AML on remission s/p induction therapy x2 with 7+3 and FLAG-JENNIFER. Received 3 doses of HIDAC consolidation chemotherapy with last in Oct. 2018. Bone marrow biopsy done on 5/9/19 showing no morphologic evidence of residual AML.  Please see oncology history in H&P for further information

## 2019-10-18 NOTE — DISCHARGE SUMMARY
"[facility]  Hematology  Bone Marrow Transplant  Discharge Summary      Patient Name: Sabrina Mac  MRN: [id number]  Admission Date: 10/16/2019  Hospital Length of Stay: 2 days  Discharge Date and Time: 10/18/2019  6:26 PM  Attending Physician: Jacob Lugo MD   Discharging Provider: Sydnie Jeff NP  Primary Care Provider: Primary Doctor No    HPI: Ms. Mac is 68 yo lady, patient of Dr. Troy with history of AML on remission s/p induction therapy x2 with 7+3 and FLAG-JENNIFER  with bone marrow biopsy done on 05/9/19 showing no morphologic evidence of residual AML. She presents with acute change in mentation.  at bedside reports sore throat and ear pain that began about three weeks ago, treated with Augmentin. However her symptoms became worse with subsequent left ear drum rupture leaking bloody/clear fluid She then went to the ED about 2 weeks ago and a higher dose of Augmentin was prescribed. Patient and  state her symptoms has not improved despite treatment. Reports persistent sore throat, frontal dull headache with out ophthalmoplegia/double vision, disequilibrium, vertigo or focal deficit. Denies neck stiffness but feels her neck is 'thick" No fever, chills, diaphoresis, abdominal pain, diarrhea, urinary symptoms. No recent travel, tick bites or sick contacts. Endorses mild sob on exertion and dry cough. No recent medication changes. No trauma to the head.  reports patient hallucinating a day prior to presentation today. Pt states she sees her dead relatives. Pt was however answering questions appropriately and oriented X4.     In the ED, pt is HD stable. Basic labs were unremarkable. CT head with out acute process. And temporal CT with "small left mastoid effusion.  No erosive changes to suggest otomastoiditis". Patient had LP done in the ED. Started on Vancomycin/ceftriaxone/Ampicillin for possible bacterial meningitis.     * No surgery found *     Hospital " "Course: Patient admitted 10/17 from ED with change in mental status. Pt remains oriented x4 this morning. Per family at bedside, she is back to baseline. Remains on vanc/rocephin/ampicillin. Per LP done in ED on 10/16, crypto negative, culture with NGTD, HSV & fungus still in process. Will repeat LP today to obtain flow and cytology to rule out CNS leukemia. Patient continues with complaints to left ear and jaw, ear filled with dried blood, unable to see TM, on PRN ibuprofen for pain, after discussion with ENT will start on cipro/dex gtts and patient will need outpatient ENT follow up. No other issues at this time, VSS, remains afebrile    Acute encephalopathy  70 yo female with history of AML on remission came in persistent sore throat and ear pain with subsequent left tympanic membrane rupture now with acute encephalopathy concerning for acute mastoiditis vs meningitis. Also considered malignancy (leukemia) vs viral vs fungal meningitis. Other differentials including metabolic encephalopathy vs Benzodiazepine withdrawal.      CT head 10/16 without acute process. CT temporal bone with small left mastoid effusion but "no erosion to suggest otomastoiditis"  UA with no evidence of infection   CBC, CMP not suggestive of infectious process or metabolic derangement   No focal deficits on physical exam on admit, no history of trauma   Pt uses Xanax intermittently for anxiety but not no frequent use per  (2-3 times weekly) - not suggestive of withdrawal  MRI brain wwo contrast 10/17 negative  LP performed in ED 10/16 with increased WBC and lymph, elevated protein. Culture negative, crypto negative, fungal and HSV in process  LP repeated 10/18 to send for flow and cytology to r/o CNS leukemia  Empiric Vancomycin, Ampicillin, Ceftriaxone for possible bacterial meningitis from contiguous spread from ear infection/sore throat  Avoid sedatives/opioids  Remains A&Ox4 today, per family back to baseline     Left ear " pain  Patient with left ear and jaw pain upon admit to hospital  States she had bloody secretions from her ear a few days ago  Unable to visualize TM or canal of left ear due to large amount of dried blood  Cannot adequately assess if patient ruptured TM  Spoke with ENT who recommends cipro/dex ear gtts with an outpatient ENT follow up after hospital discharge. Switched to just cipro gtts on discharge as cipro/dex combination >$200  Will continue tylenol and ibuprofen for pain control (avoiding opioids due to change in mental status at hospital presentation)     Acute monocytic leukemia in remission  AML on remission s/p induction therapy x2 with 7+3 and FLAG-JENNIFER. Received 3 doses of HIDAC consolidation chemotherapy with last in Oct. 2018. Bone marrow biopsy done on 5/9/19 showing no morphologic evidence of residual AML.  Please see oncology history in H&P for further information       Consults (From admission, onward)        Status Ordering Provider     Pharmacy to dose Vancomycin consult  Once     Provider:  (Not yet assigned)    Acknowledged GAUTAM LOOMIS          Significant Diagnostic Studies: Labs:   CMP   Recent Labs   Lab 10/17/19  0509 10/18/19  0444    140   K 3.9 4.5    104   CO2 22* 25   GLU 75 81   BUN 18 16   CREATININE 0.9 1.0   CALCIUM 9.7 9.8   PROT 7.0 6.7   ALBUMIN 3.6 3.4*   BILITOT 0.4 0.4   ALKPHOS 133 118   AST 20 17   ALT 23 18   ANIONGAP 14 11   ESTGFRAFRICA >60.0 >60.0   EGFRNONAA >60.0 58.0*    and CBC   Recent Labs   Lab 10/17/19  0509 10/18/19  0444   WBC 3.01* 3.31*   HGB 10.6* 10.3*   HCT 34.4* 33.0*   * 119*     Microbiology:   Blood Culture   Lab Results   Component Value Date    LABBLOO No Growth to date 10/16/2019    LABBLOO No Growth to date 10/16/2019       Pending Diagnostic Studies:     Procedure Component Value Units Date/Time    Cytology Specimen-Medical Cytology (Fluid/Wash/Brush) [[id number]] Collected:  10/18/19 1152    Order Status:  Sent Lab Status:   No result     Specimen:  Cerebrospinal fluid (CSF)     Flow Cytometry Analysis (CSF) [[id number]] Collected:  10/18/19 1152    Order Status:  Sent Lab Status:  In process Updated:  10/18/19 1447    Specimen:  CSF (Spinal Fluid) from Cerebrospinal Fluid     Freeze and Hold,  [[id number]] Collected:  10/16/19 2118    Order Status:  Sent Lab Status:  No result     Specimen:  CSF (Spinal Fluid) from Cerebrospinal Fluid     Herpes Simplex (HSV) by PCR, CSF (Tube 3) [[id number]] Collected:  10/16/19 2118    Order Status:  Sent Lab Status:  In process Updated:  10/16/19 2138    Specimen:  CSF (Spinal Fluid) from Cerebrospinal Fluid     Vitamin B1 [[id number]] Collected:  10/17/19 0509    Order Status:  Sent Lab Status:  In process Updated:  10/17/19 0624    Specimen:  Blood         Final Active Diagnoses:    Diagnosis Date Noted POA    PRINCIPAL PROBLEM:  Acute encephalopathy [G93.40] 10/16/2019 Yes    Left ear pain [H92.02] 10/18/2019 Yes    Acute monocytic leukemia in remission [C93.01] 05/24/2018 Yes      Problems Resolved During this Admission:      Discharged Condition: stable    Disposition: Home or Self Care    Follow Up: With Dr. Troy in BMT clinic. Also needs f/u in ENT clinic  Follow-up Information     [facility] .    Why:  Hospital follow-up appt as scheduled by ENT office  Contact information:  [address] [place]  [address]  Phone: [phone]               Patient Instructions:      Ambulatory consult to ENT   Referral Priority: Routine Referral Type: Consultation   Referral Reason: Specialty Services Required   Requested Specialty: Otolaryngology   Number of Visits Requested: 1     Diet Adult Regular     Notify your health care provider if you experience any of the following:  temperature >100.4     Notify your health care provider if you experience any of the following:  persistent nausea and vomiting or diarrhea     Notify your health care provider if you experience any of the  following:  severe uncontrolled pain     Notify your health care provider if you experience any of the following:  redness, tenderness, or signs of infection (pain, swelling, redness, odor or green/yellow discharge around incision site)     Notify your health care provider if you experience any of the following:  difficulty breathing or increased cough     Notify your health care provider if you experience any of the following:  severe persistent headache     Notify your health care provider if you experience any of the following:  worsening rash     Notify your health care provider if you experience any of the following:  persistent dizziness, light-headedness, or visual disturbances     Notify your health care provider if you experience any of the following:  increased confusion or weakness     Activity as tolerated     Medications:  Reconciled Home Medications:      Medication List      START taking these medications    ibuprofen 600 MG tablet  Commonly known as:  ADVIL,MOTRIN  Take 1 tablet (600 mg total) by mouth every 6 (six) hours as needed (left ear/jaw pain).     ofloxacin 0.3 % otic solution  Commonly known as:  FLOXIN  Place 5 drops into the left ear once daily.        CONTINUE taking these medications    acyclovir 200 MG capsule  Commonly known as:  ZOVIRAX  Take 2 capsules (400 mg total) by mouth 2 (two) times daily.     ALPRAZolam 0.5 MG tablet  Commonly known as:  XANAX  Take 0.5 tablets (0.25 mg total) by mouth 3 (three) times daily as needed for Insomnia or Anxiety.     LEXAPRO 20 MG tablet  Generic drug:  escitalopram oxalate  Take 20 mg by mouth.     LORazepam 0.5 MG tablet  Commonly known as:  ATIVAN  Take 1 tablet (0.5 mg total) by mouth every 6 (six) hours as needed (nausea).     magnesium oxide 400 mg (241.3 mg magnesium) tablet  Commonly known as:  MAG-OX  Take 2 tablets (800 mg total) by mouth 2 (two) times daily.     metoprolol succinate 25 MG 24 hr tablet  Commonly known as:  TOPROL  XL  Take 1 tablet (25 mg total) by mouth once daily.     omeprazole 20 MG tablet  Commonly known as:  PRILOSEC OTC  Take 20 mg by mouth every morning.     ondansetron 8 MG tablet  Commonly known as:  ZOFRAN  Take 1 tablet (8 mg total) by mouth every 12 (twelve) hours as needed for Nausea.     oxyCODONE-acetaminophen 7.5-325 mg per tablet  Commonly known as:  PERCOCET  Take 1 tablet by mouth every 4 (four) hours as needed for Pain.     predniSONE 10 MG tablet  Commonly known as:  DELTASONE  TAKE ONE TABLET BY MOUTH ONCE DAILY AS NEEDED FOR ARTHRITIS FLARE     traMADol 50 mg tablet  Commonly known as:  ULTRAM  Take 1 tablet (50 mg total) by mouth every 6 (six) hours as needed for Pain.     vitamin D 1000 units Tab  Commonly known as:  VITAMIN D3  Take 1 tablet (1,000 Units total) by mouth once daily.     walker Misc  Commonly known as:  ULTRA-LIGHT ROLLATOR  1 Units by Misc.(Non-Drug; Combo Route) route once daily.        STOP taking these medications    ciprofloxacin HCl 500 MG tablet  Commonly known as:  CIPRO     DUKE'S SOLUTION (BENADRYL 30 ML, MYLANTA 30 ML, LIDOCAINE 30 ML, NYSTATIN 30 ML)     fluconazole 200 MG Tab  Commonly known as:  DIFLUCAN     midostaurin 25 mg Cap  Commonly known as:  MAGY Jeff NP  Bone Marrow Transplant  [facility]

## 2019-10-18 NOTE — SUBJECTIVE & OBJECTIVE
Subjective:     Interval History: Pt remains oriented x4 this morning. Per family at bedside, she is back to baseline. Remains on vanc/rocephin/ampicillin. Per LP done in ED on 10/16, crypto negative, culture with NGTD, HSV & fungus still in process. Will repeat LP today to obtain flow and cytology to rule out CNS leukemia. Patient continues with complaints to left ear and jaw, ear filled with dried blood, unable to see TM, on PRN ibuprofen for pain, after discussion with ENT will start on cipro/dex gtts and patient will need outpatient ENT follow up. No other issues at this time, VSS, remains afebrile    Objective:     Vital Signs (Most Recent):  Temp: 98.4 °F (36.9 °C) (10/18/19 1200)  Pulse: 62 (10/18/19 1200)  Resp: 17 (10/18/19 1200)  BP: (!) 103/53 (10/18/19 1200)  SpO2: 96 % (10/18/19 1200) Vital Signs (24h Range):  Temp:  [98 °F (36.7 °C)-98.5 °F (36.9 °C)] 98.4 °F (36.9 °C)  Pulse:  [56-77] 62  Resp:  [16-18] 17  SpO2:  [95 %-99 %] 96 %  BP: (103-123)/(52-57) 103/53     Weight: 72.2 kg (159 lb 2.8 oz)  Body mass index is 27.32 kg/m².  Body surface area is 1.81 meters squared.    ECOG SCORE           Intake/Output - Last 3 Shifts       10/16 0700 - 10/17 0659 10/17 0700 - 10/18 0659 10/18 0700 - 10/19 0659    P.O.  480     IV Piggyback 1650      Total Intake(mL/kg) 1650 (22.9) 480 (6.6)     Net +1650 +480            Urine Occurrence  6 x     Stool Occurrence 1 x 0 x 0 x          Physical Exam   Constitutional: She is oriented to person, place, and time. She appears well-developed and well-nourished.   HENT:   Head: Normocephalic and atraumatic.   Right Ear: Hearing, tympanic membrane, external ear and ear canal normal.   Left Ear: There is drainage and tenderness. There is mastoid tenderness. Decreased hearing is noted.   Mouth/Throat: Oropharynx is clear and moist.   Unable to visualize left TM due to blockage of canal with dried blood   Eyes: Conjunctivae and EOM are normal.   Neck: Normal range of  motion.   Cardiovascular: Normal rate, regular rhythm, normal heart sounds and intact distal pulses.   No murmur heard.  Pulmonary/Chest: Effort normal and breath sounds normal. No stridor. No respiratory distress. She has no wheezes. She has no rales.   Abdominal: Soft. Bowel sounds are normal. She exhibits no distension. There is no tenderness.   Musculoskeletal: Normal range of motion. She exhibits no edema.   Neurological: She is alert and oriented to person, place, and time.   Skin: Skin is warm and dry. No rash noted.   Psychiatric: She has a normal mood and affect. Her behavior is normal. Judgment and thought content normal.   Nursing note and vitals reviewed.      Significant Labs:   CBC:   Recent Labs   Lab 10/16/19  1357 10/17/19  0509 10/18/19  0444   WBC 3.66* 3.01* 3.31*   HGB 12.6 10.6* 10.3*   HCT 37.6 34.4* 33.0*   * 123* 119*    and CMP:   Recent Labs   Lab 10/16/19  1357 10/17/19  0509 10/18/19  0444   * 137 140   K 5.6* 3.9 4.5   CL 98 101 104   CO2 21* 22* 25   GLU 79 75 81   BUN 22 18 16   CREATININE 1.1 0.9 1.0   CALCIUM 10.8* 9.7 9.8   PROT 8.6* 7.0 6.7   ALBUMIN 4.1 3.6 3.4*   BILITOT 0.6 0.4 0.4   ALKPHOS 152* 133 118   AST 30 20 17   ALT 31 23 18   ANIONGAP 14 14 11   EGFRNONAA 51.7* >60.0 58.0*       Diagnostic Results:  I have reviewed all pertinent imaging results/findings within the past 24 hours.

## 2019-10-18 NOTE — HOSPITAL COURSE
10/18/2019 Pt remains oriented x4 this morning. Per family at bedside, she is back to baseline. Remains on vanc/rocephin/ampicillin. Per LP done in ED on 10/16, crypto negative, culture with NGTD, HSV & fungus still in process. Will repeat LP today to obtain flow and cytology to rule out CNS leukemia. Patient continues with complaints to left ear and jaw, ear filled with dried blood, unable to see TM, on PRN ibuprofen for pain, after discussion with ENT will start on cipro/dex gtts and patient will need outpatient ENT follow up. No other issues at this time, VSS, remains afebrile

## 2019-10-18 NOTE — ANESTHESIA PROCEDURE NOTES
Spinal    Diagnosis: AML  Patient location during procedure: floor  Start time: 10/18/2019 11:11 AM  Timeout: 10/18/2019 11:09 AM  End time: 10/18/2019 11:24 AM    Staffing  Authorizing Provider: Marnie Goncalves MD  Performing Provider: Marnie Goncalves MD    Preanesthetic Checklist  Completed: patient identified, site marked, surgical consent, pre-op evaluation, timeout performed, IV checked, risks and benefits discussed and monitors and equipment checked  Spinal Block  Patient position: sitting  Prep: ChloraPrep  Approach: midline  Location: L4-5  Injection technique: lumbar puncture  CSF Fluid: clear free-flowing CSF  Needle  Needle type: pencil-tip   Needle gauge: 25 G  Needle length: 3.5 in  Additional Documentation: negative aspiration for heme  Needle localization: anatomical landmarks

## 2019-10-18 NOTE — PLAN OF CARE
No acute changes overnight. VSS. Hourly rounding performed. PRN ibuprofen given per order. Pt remained free of falls. Pt resting. Family at bedside. No needs at this time. Call bell in reach. Will continue to monitor.

## 2019-10-18 NOTE — PROGRESS NOTES
[facility]  Hematology  Bone Marrow Transplant  Progress Note    Patient Name: Sabrina Mac  Admission Date: 10/16/2019  Hospital Length of Stay: 2 days  Code Status: Full Code    Subjective:     Interval History: Pt remains oriented x4 this morning. Per family at bedside, she is back to baseline. Remains on vanc/rocephin/ampicillin. Per LP done in ED on 10/16, crypto negative, culture with NGTD, HSV & fungus still in process. Will repeat LP today to obtain flow and cytology to rule out CNS leukemia. Patient continues with complaints to left ear and jaw, ear filled with dried blood, unable to see TM, on PRN ibuprofen for pain, after discussion with ENT will start on cipro/dex gtts and patient will need outpatient ENT follow up. No other issues at this time, VSS, remains afebrile    Objective:     Vital Signs (Most Recent):  Temp: 98.4 °F (36.9 °C) (10/18/19 1200)  Pulse: 62 (10/18/19 1200)  Resp: 17 (10/18/19 1200)  BP: (!) 103/53 (10/18/19 1200)  SpO2: 96 % (10/18/19 1200) Vital Signs (24h Range):  Temp:  [98 °F (36.7 °C)-98.5 °F (36.9 °C)] 98.4 °F (36.9 °C)  Pulse:  [56-77] 62  Resp:  [16-18] 17  SpO2:  [95 %-99 %] 96 %  BP: (103-123)/(52-57) 103/53     Weight: 72.2 kg (159 lb 2.8 oz)  Body mass index is 27.32 kg/m².  Body surface area is 1.81 meters squared.    ECOG SCORE           Intake/Output - Last 3 Shifts       10/16 0700 - 10/17 0659 10/17 0700 - 10/18 0659 10/18 0700 - 10/19 0659    P.O.  480     IV Piggyback 1650      Total Intake(mL/kg) 1650 (22.9) 480 (6.6)     Net +1650 +480            Urine Occurrence  6 x     Stool Occurrence 1 x 0 x 0 x          Physical Exam   Constitutional: She is oriented to person, place, and time. She appears well-developed and well-nourished.   HENT:   Head: Normocephalic and atraumatic.   Right Ear: Hearing, tympanic membrane, external ear and ear canal normal.   Left Ear: There is drainage and tenderness. There is mastoid tenderness. Decreased  hearing is noted.   Mouth/Throat: Oropharynx is clear and moist.   Unable to visualize left TM due to blockage of canal with dried blood   Eyes: Conjunctivae and EOM are normal.   Neck: Normal range of motion.   Cardiovascular: Normal rate, regular rhythm, normal heart sounds and intact distal pulses.   No murmur heard.  Pulmonary/Chest: Effort normal and breath sounds normal. No stridor. No respiratory distress. She has no wheezes. She has no rales.   Abdominal: Soft. Bowel sounds are normal. She exhibits no distension. There is no tenderness.   Musculoskeletal: Normal range of motion. She exhibits no edema.   Neurological: She is alert and oriented to person, place, and time.   Skin: Skin is warm and dry. No rash noted.   Psychiatric: She has a normal mood and affect. Her behavior is normal. Judgment and thought content normal.   Nursing note and vitals reviewed.      Significant Labs:   CBC:   Recent Labs   Lab 10/16/19  1357 10/17/19  0509 10/18/19  0444   WBC 3.66* 3.01* 3.31*   HGB 12.6 10.6* 10.3*   HCT 37.6 34.4* 33.0*   * 123* 119*    and CMP:   Recent Labs   Lab 10/16/19  1357 10/17/19  0509 10/18/19  0444   * 137 140   K 5.6* 3.9 4.5   CL 98 101 104   CO2 21* 22* 25   GLU 79 75 81   BUN 22 18 16   CREATININE 1.1 0.9 1.0   CALCIUM 10.8* 9.7 9.8   PROT 8.6* 7.0 6.7   ALBUMIN 4.1 3.6 3.4*   BILITOT 0.6 0.4 0.4   ALKPHOS 152* 133 118   AST 30 20 17   ALT 31 23 18   ANIONGAP 14 14 11   EGFRNONAA 51.7* >60.0 58.0*       Diagnostic Results:  I have reviewed all pertinent imaging results/findings within the past 24 hours.    Assessment/Plan:     * Acute encephalopathy  68 yo female with history of AML on remission came in persistent sore throat and ear pain with subsequent left tympanic membrane rupture now with acute encephalopathy concerning for acute mastoiditis vs meningitis. Also considered malignancy (leukemia) vs viral vs fungal meningitis. Other differentials including metabolic  "encephalopathy vs Benzodiazepine withdrawal.     CT head 10/16 without acute process. CT temporal bone with small left mastoid effusion but "no erosion to suggest otomastoiditis"  UA with no evidence of infection   CBC, CMP not suggestive of infectious process or metabolic derangement   No focal deficits on physical exam on admit, no history of trauma   Pt uses Xanax intermittently for anxiety but not no frequent use per  (2-3 times weekly) - not suggestive of withdrawal  MRI brain wwo contrast 10/17 negative  LP performed in ED 10/16 with increased WBC and lymph, elevated protein. Culture negative, crypto negative, fungal and HSV in process  LP repeated 10/18 to send for flow and cytology to r/o CNS leukemia  Empiric Vancomycin, Ampicillin, Ceftriaxone for possible bacterial meningitis from contiguous spread from ear infection/sore throat  Avoid sedatives/opioids  Remains A&Ox4 today, per family back to baseline    Left ear pain  Patient with left ear and jaw pain upon admit to hospital  States she had bloody secretions from her ear a few days ago  Unable to visualize TM or canal of left ear due to large amount of dried blood  Cannot adequately assess if patient ruptured TM  Spoke with ENT who recommends cipro/dex ear gtts with an outpatient ENT follow up after hospital discharge  Will continue tylenol and ibuprofen for pain control (avoiding opioids due to change in mental status at hospital presentation)    Acute monocytic leukemia in remission  AML on remission s/p induction therapy x2 with 7+3 and FLAG-JENNIFER. Received 3 doses of HIDAC consolidation chemotherapy with last in Oct. 2018. Bone marrow biopsy done on 5/9/19 showing no morphologic evidence of residual AML.  Please see oncology history in H&P for further information         VTE Risk Mitigation (From admission, onward)         Ordered     enoxaparin injection 40 mg  Daily      10/16/19 2213     IP VTE HIGH RISK PATIENT  Once      10/16/19 2213    "             Disposition: Remains inpatient today. Possible discharge home tomorrow    Sydnie Jeff NP  Bone Marrow Transplant  [facility]

## 2019-10-19 LAB — VIT B1 BLD-MCNC: 23 UG/L (ref 38–122)

## 2019-10-21 ENCOUNTER — PATIENT MESSAGE (OUTPATIENT)
Dept: HEMATOLOGY/ONCOLOGY | Facility: CLINIC | Age: 69
End: 2019-10-21

## 2019-10-21 LAB
BACTERIA BLD CULT: NORMAL
BACTERIA BLD CULT: NORMAL
FLOW CYTOMETRY ANTIBODIES ANALYZED - CSF: NORMAL
FLOW CYTOMETRY COMMENT - CSF: NORMAL
FLOW CYTOMETRY INTERPRETATION - CSF: NORMAL

## 2019-10-22 ENCOUNTER — PATIENT OUTREACH (OUTPATIENT)
Dept: ADMINISTRATIVE | Facility: CLINIC | Age: 69
End: 2019-10-22

## 2019-10-22 LAB
BACTERIA CSF CULT: NO GROWTH
GRAM STN SPEC: NORMAL

## 2019-10-22 NOTE — PLAN OF CARE
MDR's with Dr. Lugo. Pt cleared for discharge by MDs/NP. NP added an order to [facility] for ambulatory care referral for ENT. CM contacted Yolanda in op ENT clinic & she sent the nurse a message to contact the patient to schedule an appt. Requested appts for next week for labs & follow-up with Dr. Troy.  at .     Future Appointments   Date Time Provider Department Center   10/23/2019 12:30 PM LAB, HEMONC SAME DAY [facility] LAB HO [facility] Fongdanielle   10/23/2019  1:30 PM Laquita Troy MD [facility] HEM ONC Cee Cand[facility]ielle   10/25/2019  1:00 PM Norma Clarke [facility] AUDIO [facility]   10/25/2019  1:30 PM Jairo Guan MD [facility] ENT [facility]   10/29/2019  3:30 PM Leslye Barger MD [facility] RHEUM [facility]        10/21/19 9140   Final Note   Assessment Type Final Discharge Note   Anticipated Discharge Disposition Home   Hospital Follow Up  Appt(s) scheduled? Yes   Discharge plans and expectations educations in teach back method with documentation complete? Yes  (per nursing)   Right Care Referral Info   Post Acute Recommendation No Care

## 2019-10-22 NOTE — PATIENT INSTRUCTIONS
Earache, No Infection (Adult)  Earaches can happen without an infection. This occurs when air and fluid build up behind the eardrum causing a feeling of fullness and discomfort and reduced hearing. This is called otitis media with effusion (OME) or serous otitis media. It means there is fluid in the middle ear. It is not the same as acute otitis media, which is typically from infection.  OME can happen when you have a cold if congestion blocks the passage that drains the middle ear. This passage is called the eustachian tube. OME may also occur with nasal allergies or after a bacterial middle ear infection.    The pain or discomfort may come and go. You may hear clicking or popping sounds when you chew or swallow. You may feel that your balance is off. Or you may hear ringing in the ear.  It often takes from several weeks up to 3 months for the fluid to clear on its own. Oral pain relievers and ear drops help if there is pain. Decongestants and antihistamines sometimes help. Antibiotics don't help since there is no infection. Your doctor may prescribe a nasal spray to help reduce swelling in the nose and eustachian tube. This can allow the ear to drain.  If your OME doesn't improve after 3 months, surgery may be used to drain the fluid and insert a small tube in the eardrum to allow continued drainage.  Because the middle ear fluid can become infected, it is important to watch for signs of an ear infection which may develop later. These signs include increased ear pain, fever, or drainage from the ear.  Home care  The following guidelines will help you care for yourself at home:  · You may use over-the-counter medicine as directed to control pain, unless another medicine was prescribed. If you have chronic liver or kidney disease or ever had a stomach ulcer or GI bleeding, talk with your doctor before using these medicines. Aspirin should never be used in anyone under 18 years of age who is ill with a fever. It  may cause severe liver damage.  · You may use over-the-counter decongestants such as phenylephrine or pseudoephedrine. But they are not always helpful. Don't use nasal spray decongestants more than 3 days. Longer use can make congestion worse. Prescription nasal sprays from your doctor don't typically have those restrictions.  · Antihistamines may help if you are also having allergy symptoms.  · You may use medicines such as guaifenesin to thin mucus and promote drainage.  Follow-up care  Follow up with your healthcare provider or as advised if you are not feeling better after 3 days.  When to seek medical advice  Call your healthcare provider right away if any of the following occur:  · Your ear pain gets worse or does not start to improve   · Fever of 100.4°F (38°C) or higher, or as directed by your healthcare provider  · Fluid or blood draining from the ear  · Headache or sinus pain  · Stiff neck  · Unusual drowsiness or confusion  Date Last Reviewed: 10/1/2016  © 5181-3754 [employer] [address] [place] [address], [place] [address]. All rights reserved. This information is not intended as a substitute for professional medical care. Always follow your healthcare professional's instructions.

## 2019-10-22 NOTE — PHYSICIAN QUERY
"PT Name: Sabrina Mac  MR #: [id number]     CDS: Joi Barrios RN      Contact information:[email or web address]    This form is a permanent document in the medical record.     Query Date: October 22, 2019    Physician Query - Neurological Condition Clarification    By submitting this query, we are merely seeking further clarification of documentation to reflect the severity of illness of your patient. Please utilize your independent clinical judgment when addressing the question(s) below.    The Medical record reflects the following:     Indicators   Supporting Clinical Findings Location in Medical Record   x AMS, Confusion,  LOC, etc.  Acute encephalopathy  68 yo female with history of AML on remission came in persistent sore throat and ear pain with subsequent left tympanic membrane rupture now with acute encephalopathy concerning for acute mastoiditis vs meningitis. Also considered malignancy (leukemia) vs viral vs fungal meningitis. Other differentials including metabolic encephalopathy vs Benzodiazepine withdrawal.    CT head 10/16 without acute process. CT temporal bone with small left mastoid effusion but "no erosion to suggest otomastoiditis"  UA with no evidence of infection  CBC, CMP not suggestive of infectious process or metabolic derangement  No focal deficits on physical exam on admit, no history of trauma  Pt uses Xanax intermittently for anxiety but not no frequent use per  (2-3 times weekly) - not suggestive of withdrawal  MRI brain wwo contrast 10/17 negative  LP performed in ED 10/16 with increased WBC and lymph, elevated protein. Culture negative, crypto negative, fungal and HSV in process  LP repeated 10/18 to send for flow and cytology to r/o CNS leukemia  Empiric Vancomycin, Ampicillin, Ceftriaxone for possible bacterial meningitis from contiguous spread from ear infection/sore throat  Avoid sedatives/opioids  Remains A&Ox4 today, per family back to baseline    ICD-10-CM ICD-9-CM " 1. Delirium R41.0 780.09 2. Altered mental   D/C Summary 10/18                                              ED note 10/16     x Acute / Chronic Illness Acute monocytic leukemia in remission  AML on remission s/p induction therapy x2 with 7+3 and FLAG-JENNIFER. Received 3 doses of HIDAC consolidation chemotherapy with last in Oct. 2018. Bone marrow biopsy done on 5/9/19 showing no morphologic evidence of residual AML.  Please see oncology history in H&P for further information    D/C Summary 10/18     x Radiology Findings No acute intracranial pathology, specifically no acute infarction.    Remote insult in the right frontal lobe corona radiata adjacent to the frontal horn of the right lateral ventricle.  Superimposed chronic microvascular ischemic change.   MRI 10/17      Electrolyte Imbalance      Medication     x Treatment         Empiric Vancomycin, Ampicillin, Ceftriaxone for possible bacterial meningitis from contiguous spread from ear infection/sore throat D/C Summary 10/18    Other       Encephalopathy- is a general term for any diffuse disease of the brain that alters brain function or structure. Treatment of the cognitive dysfunction varies but is ultimately dependent on the treatment of the underlying condition.    Major Symptoms of Encephalopathy - Decreased level of consciousness, fluctuating alertness/concentration, confusion, agitation, lethargy, somnolence, drowsiness, obtundation, stupor, or coma.         References: National Institutes of Healths (NIH) National [place] of Neurological Disorders and Strokes;  HCPro 2016; Advisory Board     Clinical Guidelines:   These guidelines will set system standards to assist providers in managing, documentation, and coding of encephalopathy. The intent of this document is to serve as a system guideline, not replace the providers clinical judgment:  Provider, please specify the diagnosis or diagnoses associated with above clinical findings.  [x   ] Metabolic  Encephalopathy - Due to electrolye imbalance, metabolic derangements, or infections processes, includes Septic Encephalopathy   [   ] Other Encephalopathy - Includes uremic encephalopathy   [   ] Unspecified Encephalopathy      [   ] Other Neurological Condition-  Includes Post-ictal altered mental status. (please specify condition): _________   [   ]  Clinically Undetermined     Please document in your progress notes daily for the duration of treatment until resolved, and include in your discharge summary.

## 2019-10-23 NOTE — ASSESSMENT & PLAN NOTE
Take full course of antibiotics as prescribed.  Humidifier use at home.  Warm compresses to affected ear  Elevate head on a pillow at night     Increase fluids and rest are important.    Zyrtec for allergies  Flonase or Saline nasal spray for nasal congestion  Children's Over the Counter Delsym for cough and congestion    Tylenol or Motrin every 4 - 6 hours as needed for fever, pain or fussiness.    Follow up with your pediatrician in the next 48-72hrs or sooner for re-eval especially if no improvement in symptoms.    Follow up in the ER for any worsening of symptoms such as new fever, shortness of breath, chest pain, trouble swallowing, ect.    Parent verbalizes understanding and agrees with plan of care.       Acute Otitis Media with Infection (Child)    Your child has a middle ear infection (acute otitis media). It is caused by bacteria or fungi. The middle ear is the space behind the eardrum. The eustachian tube connects the ear to the nasal passage. The eustachian tubes help drain fluid from the ears. They also keep the air pressure equal inside and outside the ears. These tubes are shorter and more horizontal in children. This makes it more likely for the tubes to become blocked. A blockage lets fluid and pressure build up in the middle ear. Bacteria or fungi can grow in this fluid and cause an ear infection. This infection is commonly known as an earache.  The main symptom of an ear infection is ear pain. Other symptoms may include pulling at the ear, being more fussy than usual, decreased appetite, and vomiting or diarrhea. Your childs hearing may also be affected. Your child may have had a respiratory infection first.  An ear infection may clear up on its own. Or your child may need to take medicine. After the infection goes away, your child may still have fluid in the middle ear. It may take weeks or months for this fluid to go away. During that time, your child may have temporary hearing loss. But  - CXR suggestive of 7 mm slightly irregular shaped radiodensity projected over the left upper lung zone which could represent a calcified granuloma but remains indeterminate.  - CT Chest on 5/28: Multiple scattered noncalcified pulmonary granulomas, the largest measuring 8 mm in the lateral segment of the right middle lobe.   - repeat non-contrast chest CT at 6-12 months    all other symptoms of the earache should be gone.  Home care  Follow these guidelines when caring for your child at home:  · The healthcare provider will likely prescribe medicines for pain. The provider may also prescribe antibiotics or antifungals to treat the infection. These may be liquid medicines to give by mouth. Or they may be ear drops. Follow the providers instructions for giving these medicines to your child.  · Because ear infections can clear up on their own, the provider may suggest waiting for a few days before giving your child medicines for infection.  · To reduce pain, have your child rest in an upright position. Hot or cold compresses held against the ear may help ease pain.  · Keep the ear dry. Have your child wear a shower cap when bathing.  To help prevent future infections:  · Avoid smoking near your child. Secondhand smoke raises the risk for ear infections in children.  · Make sure your child gets all appropriate vaccines.  · Do not bottle-feed while your baby is lying on his or her back. (This position can cause middle ear infections because it allows milk to run into the eustachian tubes.)      · If you breastfeed, continue until your child is 6 to 12 months of age.  To apply ear drops:  1. Put the bottle in warm water if the medicine is kept in the refrigerator. Cold drops in the ear are uncomfortable.  2. Have your child lie down on a flat surface. Gently hold your childs head to one side.  3. Remove any drainage from the ear with a clean tissue or cotton swab. Clean only the outer ear. Dont put the cotton swab into the ear canal.  4. Straighten the ear canal by gently pulling the earlobe up and back.  5. Keep the dropper a half-inch above the ear canal. This will keep the dropper from becoming contaminated. Put the drops against the side of the ear canal.  6. Have your child stay lying down for 2 to 3 minutes. This gives time for the medicine to enter the ear canal. If your child  doesnt have pain, gently massage the outer ear near the opening.  7. Wipe any extra medicine away from the outer ear with a clean cotton ball.  Follow-up care  Follow up with your childs healthcare provider as directed. Your child will need to have the ear rechecked to make sure the infection has resolved. Check with your doctor to see when they want to see your child.  Special note to parents  If your child continues to get earaches, he or she may need ear tubes. The provider will put small tubes in your childs eardrum to help keep fluid from building up. This procedure is a simple and works well.  When to seek medical advice  Unless advised otherwise, call your child's healthcare provider if:  · Your child is 3 months old or younger and has a fever of 100.4°F (38°C) or higher. Your child may need to see a healthcare provider.  · Your child is of any age and has fevers higher than 104°F (40°C) that come back again and again.  Call your child's healthcare provider for any of the following:  · New symptoms, especially swelling around the ear or weakness of face muscles  · Severe pain  · Infection seems to get worse, not better   · Neck pain  · Your child acts very sick or not himself or herself  · Fever or pain do not improve with antibiotics after 48 hours  Date Last Reviewed: 5/3/2015  © 9076-9993 [employer]. [address] [place] [address], [place] [address]. All rights reserved. This information is not intended as a substitute for professional medical care. Always follow your healthcare professional's instructions.

## 2019-10-25 ENCOUNTER — CLINICAL SUPPORT (OUTPATIENT)
Dept: AUDIOLOGY | Facility: CLINIC | Age: 69
End: 2019-10-25
Payer: MEDICARE

## 2019-10-25 ENCOUNTER — OFFICE VISIT (OUTPATIENT)
Dept: OTOLARYNGOLOGY | Facility: CLINIC | Age: 69
End: 2019-10-25
Payer: MEDICARE

## 2019-10-25 VITALS — HEIGHT: 64 IN | BODY MASS INDEX: 27.18 KG/M2 | WEIGHT: 159.19 LBS

## 2019-10-25 DIAGNOSIS — H65.02 NON-RECURRENT ACUTE SEROUS OTITIS MEDIA OF LEFT EAR: ICD-10-CM

## 2019-10-25 DIAGNOSIS — H90.A32 MIXED CONDUCTIVE AND SENSORINEURAL HEARING LOSS OF LEFT EAR WITH RESTRICTED HEARING OF RIGHT EAR: Primary | ICD-10-CM

## 2019-10-25 DIAGNOSIS — H90.A21 SENSORINEURAL HEARING LOSS (SNHL) OF RIGHT EAR WITH RESTRICTED HEARING OF LEFT EAR: Primary | ICD-10-CM

## 2019-10-25 DIAGNOSIS — H90.A12 CONDUCTIVE HEARING LOSS OF LEFT EAR WITH RESTRICTED HEARING OF RIGHT EAR: ICD-10-CM

## 2019-10-25 PROCEDURE — 99999 PR PBB SHADOW E&M-EST. PATIENT-LVL I: CPT | Mod: PBBFAC,,, | Performed by: PHYSICIAN ASSISTANT

## 2019-10-25 PROCEDURE — 92567 TYMPANOMETRY: CPT | Mod: 52,PBBFAC | Performed by: PHYSICIAN ASSISTANT

## 2019-10-25 PROCEDURE — 92557 COMPREHENSIVE HEARING TEST: CPT | Mod: PBBFAC | Performed by: PHYSICIAN ASSISTANT

## 2019-10-25 PROCEDURE — 99999 PR PBB SHADOW E&M-EST. PATIENT-LVL I: ICD-10-PCS | Mod: PBBFAC,,, | Performed by: PHYSICIAN ASSISTANT

## 2019-10-25 PROCEDURE — 99213 OFFICE O/P EST LOW 20 MIN: CPT | Mod: PBBFAC,27,25 | Performed by: OTOLARYNGOLOGY

## 2019-10-25 PROCEDURE — 99203 PR OFFICE/OUTPT VISIT, NEW, LEVL III, 30-44 MIN: ICD-10-PCS | Mod: S$PBB,,, | Performed by: OTOLARYNGOLOGY

## 2019-10-25 PROCEDURE — 99211 OFF/OP EST MAY X REQ PHY/QHP: CPT | Mod: PBBFAC,25 | Performed by: PHYSICIAN ASSISTANT

## 2019-10-25 PROCEDURE — 99999 PR PBB SHADOW E&M-EST. PATIENT-LVL III: ICD-10-PCS | Mod: PBBFAC,,, | Performed by: OTOLARYNGOLOGY

## 2019-10-25 PROCEDURE — 99203 OFFICE O/P NEW LOW 30 MIN: CPT | Mod: S$PBB,,, | Performed by: OTOLARYNGOLOGY

## 2019-10-25 PROCEDURE — 99999 PR PBB SHADOW E&M-EST. PATIENT-LVL III: CPT | Mod: PBBFAC,,, | Performed by: OTOLARYNGOLOGY

## 2019-10-25 RX ORDER — ONDANSETRON HYDROCHLORIDE 8 MG/1
TABLET, FILM COATED ORAL
COMMUNITY
Start: 2019-07-22 | End: 2022-05-10 | Stop reason: SDUPTHER

## 2019-10-25 RX ORDER — PREDNISONE 10 MG/1
5 TABLET ORAL
COMMUNITY
Start: 2019-06-28 | End: 2020-09-03 | Stop reason: SDUPTHER

## 2019-10-25 NOTE — PROGRESS NOTES
Sabrina Mca was seen today in the clinic for an audiologic evaluation.  Patients main complaint was left ear pain and dizziness.  Ms. Mac reported that her left ear drum ruptured about 2 weeks ago resulting in dizziness and ear pain. Patient additionally reported difficulty hearing bilaterally.     Tympanometry revealed Type A3 in the right ear and Could not seal in the left ear due to noted TM perforation.  Audiogram results revealed a moderate high frequency sensorineural hearing loss in the right ear and a mild to moderate conductive hearing loss in the left ear with a noted airbone gap closure at 2kHz.  Speech reception thresholds were noted at 25 dB in the right ear and 35 dB in the left ear.  Speech discrimination scores were 100% in the right ear and 100% in the left ear.    Recommendations:  1. Otologic evaluation  2. Annual audiogram  3. Noise protection when in noise

## 2019-10-26 NOTE — PROGRESS NOTES
Subjective:       Patient ID: Sabrina Mac is a 69 y.o. female.    Chief Complaint: Hearing Loss and Otitis Media    HPI     Sabrina Mac is a 69 y.o. female with history of AML.  She had recent hospitalization for work up of possible meningitis.  durign the hospitalization she had a AOM of AS with rupture of TM.  She reports decreased hearing of left ear it is constant and moderate in nature.    Review of Systems   Constitutional: Negative for chills, fever and unexpected weight change.   HENT: Negative for sore throat and trouble swallowing.    Eyes: Negative for pain and visual disturbance.   Respiratory: Negative for apnea and shortness of breath.    Cardiovascular: Negative for chest pain and palpitations.   Gastrointestinal: Negative for abdominal pain and nausea.   Endocrine: Negative for cold intolerance and heat intolerance.   Musculoskeletal: Negative for joint swelling and neck stiffness.   Skin: Negative for color change and rash.   Neurological: Negative for facial asymmetry and headaches.   Hematological: Negative for adenopathy. Does not bruise/bleed easily.   Psychiatric/Behavioral: Negative for agitation. The patient is not nervous/anxious.        Objective:      Physical Exam   Constitutional: She is oriented to person, place, and time. She appears well-developed and well-nourished. No distress.   HENT:   Head: Normocephalic and atraumatic.   Right Ear: Tympanic membrane, external ear and ear canal normal.   Left Ear: External ear and ear canal normal. No drainage. A middle ear effusion is present. No decreased hearing (there is a cerumen impaction after removal TM is intac) is noted.   Nose: Nose normal.   Mouth/Throat: Uvula is midline, oropharynx is clear and moist and mucous membranes are normal.   Eyes: Pupils are equal, round, and reactive to light. Conjunctivae and EOM are normal.   Neck: Normal range of motion. No tracheal deviation present. No thyromegaly present.   Cardiovascular:  Normal rate and regular rhythm.   Pulmonary/Chest: Effort normal. No respiratory distress.   Musculoskeletal: Normal range of motion.   Lymphadenopathy:        Head (right side): No submental, no submandibular and no tonsillar adenopathy present.        Head (left side): No submental, no submandibular and no tonsillar adenopathy present.     She has no cervical adenopathy.   Neurological: She is alert and oriented to person, place, and time.   Psychiatric: She has a normal mood and affect. Her behavior is normal.   Nursing note and vitals reviewed.      Data:        Assessment:       1. Hearing loss, unspecified hearing loss type, unspecified laterality    2. Non-recurrent acute serous otitis media of left ear        Plan:         In summary this is a pleasant 69 y.o. with left ear serous otitis media following AOM with rupture of TM.  Able to insufflate, will likely resolve spontaneously  Follow up in 1 mo with audio  Will consider PE tube if unresolved

## 2019-11-11 ENCOUNTER — PATIENT MESSAGE (OUTPATIENT)
Dept: HEMATOLOGY/ONCOLOGY | Facility: CLINIC | Age: 69
End: 2019-11-11

## 2019-11-18 ENCOUNTER — TELEPHONE (OUTPATIENT)
Dept: HEMATOLOGY/ONCOLOGY | Facility: CLINIC | Age: 69
End: 2019-11-18

## 2019-11-19 ENCOUNTER — OFFICE VISIT (OUTPATIENT)
Dept: HEMATOLOGY/ONCOLOGY | Facility: CLINIC | Age: 69
End: 2019-11-19
Payer: MEDICARE

## 2019-11-19 ENCOUNTER — LAB VISIT (OUTPATIENT)
Dept: LAB | Facility: HOSPITAL | Age: 69
End: 2019-11-19
Payer: MEDICARE

## 2019-11-19 VITALS
WEIGHT: 165.38 LBS | RESPIRATION RATE: 16 BRPM | HEART RATE: 91 BPM | SYSTOLIC BLOOD PRESSURE: 118 MMHG | OXYGEN SATURATION: 97 % | BODY MASS INDEX: 28.38 KG/M2 | DIASTOLIC BLOOD PRESSURE: 56 MMHG | TEMPERATURE: 99 F

## 2019-11-19 DIAGNOSIS — C92.00 ACUTE MYELOID LEUKEMIA NOT HAVING ACHIEVED REMISSION: ICD-10-CM

## 2019-11-19 DIAGNOSIS — H92.02 LEFT EAR PAIN: ICD-10-CM

## 2019-11-19 DIAGNOSIS — C93.01 ACUTE MONOCYTIC LEUKEMIA IN REMISSION: ICD-10-CM

## 2019-11-19 DIAGNOSIS — M35.1 MCTD (MIXED CONNECTIVE TISSUE DISEASE): ICD-10-CM

## 2019-11-19 DIAGNOSIS — C92.00 AML (ACUTE MYELOBLASTIC LEUKEMIA): ICD-10-CM

## 2019-11-19 DIAGNOSIS — S03.00XA TMJ (DISLOCATION OF TEMPOROMANDIBULAR JOINT), INITIAL ENCOUNTER: Primary | ICD-10-CM

## 2019-11-19 LAB
ABO + RH BLD: NORMAL
ALBUMIN SERPL BCP-MCNC: 3.4 G/DL (ref 3.5–5.2)
ALP SERPL-CCNC: 85 U/L (ref 55–135)
ALT SERPL W/O P-5'-P-CCNC: 10 U/L (ref 10–44)
ANION GAP SERPL CALC-SCNC: 7 MMOL/L (ref 8–16)
AST SERPL-CCNC: 12 U/L (ref 10–40)
BILIRUB SERPL-MCNC: 0.4 MG/DL (ref 0.1–1)
BLD GP AB SCN CELLS X3 SERPL QL: NORMAL
BUN SERPL-MCNC: 12 MG/DL (ref 8–23)
CALCIUM SERPL-MCNC: 9.1 MG/DL (ref 8.7–10.5)
CHLORIDE SERPL-SCNC: 105 MMOL/L (ref 95–110)
CO2 SERPL-SCNC: 28 MMOL/L (ref 23–29)
CREAT SERPL-MCNC: 0.9 MG/DL (ref 0.5–1.4)
ERYTHROCYTE [DISTWIDTH] IN BLOOD BY AUTOMATED COUNT: 13.8 % (ref 11.5–14.5)
EST. GFR  (AFRICAN AMERICAN): >60 ML/MIN/1.73 M^2
EST. GFR  (NON AFRICAN AMERICAN): >60 ML/MIN/1.73 M^2
GLUCOSE SERPL-MCNC: 99 MG/DL (ref 70–110)
HCT VFR BLD AUTO: 35.4 % (ref 37–48.5)
HGB BLD-MCNC: 11.1 G/DL (ref 12–16)
IMM GRANULOCYTES # BLD AUTO: 0.03 K/UL (ref 0–0.04)
MAGNESIUM SERPL-MCNC: 1.7 MG/DL (ref 1.6–2.6)
MCH RBC QN AUTO: 31.4 PG (ref 27–31)
MCHC RBC AUTO-ENTMCNC: 31.4 G/DL (ref 32–36)
MCV RBC AUTO: 100 FL (ref 82–98)
NEUTROPHILS # BLD AUTO: 2.7 K/UL (ref 1.8–7.7)
PHOSPHATE SERPL-MCNC: 3.4 MG/DL (ref 2.7–4.5)
PLATELET # BLD AUTO: 67 K/UL (ref 150–350)
PMV BLD AUTO: 10.1 FL (ref 9.2–12.9)
POTASSIUM SERPL-SCNC: 3.2 MMOL/L (ref 3.5–5.1)
PROT SERPL-MCNC: 6.5 G/DL (ref 6–8.4)
RBC # BLD AUTO: 3.53 M/UL (ref 4–5.4)
SODIUM SERPL-SCNC: 140 MMOL/L (ref 136–145)
WBC # BLD AUTO: 4.83 K/UL (ref 3.9–12.7)

## 2019-11-19 PROCEDURE — 1159F PR MEDICATION LIST DOCUMENTED IN MEDICAL RECORD: ICD-10-PCS | Mod: ,,, | Performed by: INTERNAL MEDICINE

## 2019-11-19 PROCEDURE — 99999 PR PBB SHADOW E&M-EST. PATIENT-LVL IV: ICD-10-PCS | Mod: PBBFAC,,, | Performed by: INTERNAL MEDICINE

## 2019-11-19 PROCEDURE — 99214 OFFICE O/P EST MOD 30 MIN: CPT | Mod: PBBFAC,25 | Performed by: INTERNAL MEDICINE

## 2019-11-19 PROCEDURE — 84100 ASSAY OF PHOSPHORUS: CPT

## 2019-11-19 PROCEDURE — 83735 ASSAY OF MAGNESIUM: CPT

## 2019-11-19 PROCEDURE — 1126F AMNT PAIN NOTED NONE PRSNT: CPT | Mod: ,,, | Performed by: INTERNAL MEDICINE

## 2019-11-19 PROCEDURE — 1159F MED LIST DOCD IN RCRD: CPT | Mod: ,,, | Performed by: INTERNAL MEDICINE

## 2019-11-19 PROCEDURE — 85027 COMPLETE CBC AUTOMATED: CPT

## 2019-11-19 PROCEDURE — 86901 BLOOD TYPING SEROLOGIC RH(D): CPT

## 2019-11-19 PROCEDURE — 99215 OFFICE O/P EST HI 40 MIN: CPT | Mod: S$PBB,,, | Performed by: INTERNAL MEDICINE

## 2019-11-19 PROCEDURE — 80053 COMPREHEN METABOLIC PANEL: CPT

## 2019-11-19 PROCEDURE — 36415 COLL VENOUS BLD VENIPUNCTURE: CPT

## 2019-11-19 PROCEDURE — 99215 PR OFFICE/OUTPT VISIT, EST, LEVL V, 40-54 MIN: ICD-10-PCS | Mod: S$PBB,,, | Performed by: INTERNAL MEDICINE

## 2019-11-19 PROCEDURE — 99999 PR PBB SHADOW E&M-EST. PATIENT-LVL IV: CPT | Mod: PBBFAC,,, | Performed by: INTERNAL MEDICINE

## 2019-11-19 PROCEDURE — 1126F PR PAIN SEVERITY QUANTIFIED, NO PAIN PRESENT: ICD-10-PCS | Mod: ,,, | Performed by: INTERNAL MEDICINE

## 2019-11-19 NOTE — Clinical Note
1. MRI maxillofacial in [place] on Thursday, Friday or sat this week [please call pts cell to schedule]2. Other follow up with me has been arranged

## 2019-11-20 ENCOUNTER — TELEPHONE (OUTPATIENT)
Dept: HEMATOLOGY/ONCOLOGY | Facility: CLINIC | Age: 69
End: 2019-11-20

## 2019-11-20 NOTE — TELEPHONE ENCOUNTER
Called patient to give date and time for MRI in [place].Gave address and phone number.        ----- Message from Laquita Troy MD sent at 11/20/2019  1:47 PM CST -----  1. MRI maxillofacial in [place] on Thursday, Friday or sat this week [please call pts cell to schedule]  2. Other follow up with me has been arranged

## 2019-11-21 ENCOUNTER — TELEPHONE (OUTPATIENT)
Dept: RADIOLOGY | Facility: HOSPITAL | Age: 69
End: 2019-11-21

## 2019-11-21 LAB — FUNGUS SPEC CULT: NORMAL

## 2019-11-22 ENCOUNTER — HOSPITAL ENCOUNTER (OUTPATIENT)
Dept: RADIOLOGY | Facility: HOSPITAL | Age: 69
Discharge: HOME | End: 2019-11-22
Attending: INTERNAL MEDICINE
Payer: MEDICARE

## 2019-11-22 DIAGNOSIS — S03.00XA TMJ (DISLOCATION OF TEMPOROMANDIBULAR JOINT), INITIAL ENCOUNTER: ICD-10-CM

## 2019-11-22 PROCEDURE — 70540 MRI MAXILLOFACIAL WITHOUT CONTRAST: ICD-10-PCS | Mod: 26,,, | Performed by: RADIOLOGY

## 2019-11-22 PROCEDURE — 70540 MRI ORBIT/FACE/NECK W/O DYE: CPT | Mod: TC

## 2019-11-22 PROCEDURE — 70540 MRI ORBIT/FACE/NECK W/O DYE: CPT | Mod: 26,,, | Performed by: RADIOLOGY

## 2019-11-25 ENCOUNTER — TELEPHONE (OUTPATIENT)
Dept: HEMATOLOGY/ONCOLOGY | Facility: CLINIC | Age: 69
End: 2019-11-25

## 2019-11-25 NOTE — PROGRESS NOTES
Hematology and Medical Oncology   Follow Up Note     11/19/2019    Primary Oncologic Diagnosis: AML, FLT 3 + and NPMN1+.   History of Present Ilness:   Sabrina Badillo) is a pleasant 69 y.o.female presents to clinic following 2 induction therapies for AML. She was in the hospital roughly 50 days to receive 7+3 and then FLAG JENNIFER.    Oncology History:   68 y.o. female admitted overnight for possible new AML. Came in from OSH with elevated WBC of 100.   05/25/2018: Pt is now on hydrea 2 grams BID, allopurinol 300 mg daily, and IVF. Pt may need rasburicase this weekend. She will undergo a BM bx and aspiration today. She is an induction candidate and will not be screened for research trials. She has anxiety for which she usually takes xanax, this was re-started.   05/26/2018 PICC placed. Reports she slept well last night. Anxiety improved with prn xanax. EF 65%, HIV and Hep panel negative. 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  6/12/2018: Day 15 of 7+3 induction, restaging BM bx done yesterday. On Midostaurin. Fever yesterday and BC with gram + cocci in clusters. On cefepime day 2 and will start Vanc today. Labial mucositis improving.   6/13/2018: Day 16 7+3. BC with staph aureus, identification pending. Surveillance blood cultures done today. Afebrile x 48 hours. On cefepime and Vanc. Labial mucositis resolved. No complaints of pain. Nausea controlled. No diarrhea. Primary complaint is fatigue.   6/14/20018: Day 17 of 7+3. Day 14 bone marrow results pending. BC with staph epidermis only sensitive to Vancomycin. Vanc day 3 and cefepime day 4. Vanc trough 12.2 last night. BP remains low but stable. Afebrile x 72 hours.   6/15/2018: Day 18 of 7+3. Day 14 bone marrow biopsy shows persistent disease with 15% blasts. Discussion with patient regarding treatment and she is deciding if she wants to proceed with re-induction vs outpatient maintenance. Temp of 100.4 overnight, unsustained. Day 5 Cefepime and Day 4 of  Vanc for staph epidermis. Repeat cultures NGTD. BP improved. Electrolytes (mag, phos, K and calcium) replaced aggressively this am and will repeat labs this afternoon. No complaints this am.   06/16/2018: Day 19 of 7+3. Day 2 of Flag-JENNIFER. Remains afebrile. Calcium remains low and have increased PO supplementation. Remains on vanc and aztreonam. Somewhat loopy feeling after receiving benadryl.   06/17/2018: Day 20 of 7+3. Day 3 of FLAG-JENNIFER. Multiple electrolyte abnormalities. New bilateral leg and feet swelling.   6/18/2018: Day 21 of 7+3 and Day 4 of FLAG JENNIFER. Tolerating well with some nausea controlled with Zofran. VSS, afebrile. ANC 1900 on Neupogen. Continues Vanc for staph epi bacteremia. Multiple electrolytes replaced today. Complains of edema to lower extremities, not improved after 20 of lasix yesterday. No sob or CP.   6/19/18: Day 22 of 7+3 and Day 5 of FLAG JENNIFER. VSS, afebrile, ANC 3900. Vanc trough elevated and dose adjusted this am. Lower extremity edema improved after 40 of lasix yesterday, net negative 300 cc I&O. Mild nausea, no abdominal pain or diarrhea. Poor appetite but no difficulty eating or taking pills.   6/20/18: Day 23 of 7+3 and Day 6 of FLAG JENNIFER. Patient complains of nausea and vomiting overnight and this am, especially when taking pills. Will add Zyprexa daily in addition to Zofran, compazine, and ativan PRN and will change meds to IV. 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 Afebrile.  No diarrhea, mouth sores or pain.  06/21/2018: Day 24 of 7+3 and Day 7 of FLAG JENNIFER. Nausea better controlled. Continued on Vanc.  6/22/2018: Day 25 of 7+3 and Day 8 of FLAG JENNIFER. Nausea improved some with Zyprexa but did have 1 episode of emesis overnight.continuing meds IV due to vomiting. Remains afebrile. ANC 0. Continues Vanc and Flagyl. Electrolytes replaced.   06/23/2018 : day 26 of 7+3 and Day 9 of FLAG JENNIFER.  Nausea persists,  worsened after taking pills so meds converted to IV.  Encouraged ambulation.  Continue with flagyl for leptotrichia goodfellowii.  RUQ US showed intrahepatic dilation, overall impression hepatic steatosis.  CBD 0.5cm.  No Gallbladder.    06/24/2018 : day 27 of 7+3 and Day 10 of FLAG JENNIFER.  Nausea persisting, able to tolerate some po pills.  Last day of flagyl (day 5).  Still pancytopenic. Appetite still low as po intake triggers nausea.  06/25/2018: day 28 of 7+3 and Day 11 of FLAG JENNIFER. Afebrile, ANC 0. Has completed Flagyl. Will decrease Vanc to 1000 mg q12, trough 19.9, will complete Vanc on 6/27. Liver enzymes stable on Midostaurin. VSS.   6/26/2018: day 29 of 7+3 and day 12 of FLAG JENNIFER. Liver enzymes improved and Midostaurin increased to full dose 50 mg bid. Nausea controlled, afebrile, VSS, NEON.  6/27/2018: day 30 of 7+3 and Day 13 of FLAG JENNIFER. Persistent nausea and emesis x 1 yesterday. Compazine changed to scheduled. Vanc ends today. Afebrile, VSS. Aggressive electrolyte replacement, low electrolytes possibly due to Midostaurin.   6/28/2018: day 31 of 7+3 and Day 14 of FLAG JENNIFER. Nausea improved with scheduled Compazine. Patient has agreed to start converting some IV meds to po. VSS, afebrile, electrolytes wnl today. Only complains of fatigue, new rash noted to upper back and chest and legs, papular rash mildly red.  6/29/2018: Day 32 of 7+3 and Day 15 of FLAG JENNIFER. Day 14 restaging bone marrow biopsy done at bedside today. Patient states nausea improved but she did have emesis last night after taking 9 pm pills. Rash improved. No mouth sores, diarrhea or pain.   7/2/2018: Day 35 of 7+3 and Day 18 of FLAG JENNIFER. Restaging BM bx results pending. Fluid overload over the weekend. Chest x-ray with pulmonary edema. Os sats down to 88%. Placed on O2 at 2 L NC, off O2 this am. Received lasix. Net negative 2.7 L today. 1 episode of nausea, no emesis. Reports anxiety relieved with PRN meds. Electrolytes improved,  afebrile, VSS.   7/3/2018: Day 36 of 7+3 and Day 19 of FLAG JENNIFER. Restaging Bm bx with AGBRIEL. Cardiology consulted for abnormal echo, EF 30% with pulmonary HTN and severe L atrial enlargement. EKG with T wave abnormality, possible anterolateral ischemia and prolonged QTc of 530. Medications adjusted. Nausea controlled, no diarrhea. Electrolytes improved. On O2 as needed.   07/04/2018 : Day 37 of 7+3 and Day 20 of FLAG JENNIFER Diarrhea in AM, watery, no associated abdominal pain, nausea, or vomiting.    07/05/2018: Day 38 of 7+3 ane Day 21 of FLAG JENNIFER. No CP or SOB, cardiology following. On RA. All meds changed to po, denies nausea or vomiting and no diarrhea. VSS, afebrile.   7/6/2018: Day 22 of FLAG JENNIFER. Continues to tolerate po meds with no nausea. Afebrile. Awaiting count recovery for discharge.  7/7/2018-/8/2018: Day 40/41 of 7+3, Day 23/24 of FLAG JENNIFER.  Improving clinically.  Started on mag oxide per patient request.  Labs showing signs of ANC recovery.  7/9/2018: Day 25 FLAG JENNIFER, , continues Neupogen. Received platelets today. Afebrile, VSS. Tolerating all oral meds with no nausea or vomiting. Only complains of fatigue.   7/10/2018: Day 26 FLAG JENNIFER,  today. Had 2 episodes of vomiting and diarrhea last night. Feeling better. Afebrile, VSS.   7/11/2018: Day 27 FLAG JENNIFER,  today. No vomiting or diarrhea overnight and no nausea. Afebrile, VSS. Complains of back pain (bone pain) suspect due to count recovery. Relieved with oxy IR and Zyrtec started.  07/12/2018: Day 28 flag jennifer, engrafted today with ANC of 730. Back pain improved with zyrtec. Scheduled for IT chemo tomorrow at 1:30 pm and discharge home thereafter. Will likely need plt transfusion prior to IT chemo tomorrow   7/13/2018: Day 29 of FLAG JENNIFER. ANC up to 1300 today. Transfusing platelets today prior to LP for IT chemo. Patient continues to complain of bone pain, mostly to back and legs. No nausea, diarrhea, sob. Afebrile and VSS.    --hospitalized 7/23-7/25 for C.diff diarrhea, neutropenic fever.  While inpatient she developed pink blisters on her palm that were concerning for relapse.  --palm biopsy on 7/26/18 for suspicion of leukemia cutis was also negative for sign of relapse  --bone marrow biopsy 7/31/18 was negative for signs of relapse   --Admitted on 8/14/18 for HiDAC#1  --Admitted on 9/11/18 for HiDAC#2  --Admitted on 10/16/18 for HiDAC#3  --Post consolidation bone marrow biopsy on 12/27/2018: AML FISH (BM) Interpretation: The result is within normal limits for the AML FISH panel. No evidence of residual disease.  --Bone Marrow Biopsy on 5/9/19 NO MORPHOLOGIC EVIDENCE OF RESIDUAL ACUTE MYELOID LEUKEMIA. RECOMMEND NGS STUDY TO RULE OUT RESIDUAL DISEASE. deletion 20q    Interval History:   Ms. Wilkes had a rough several months, complicated with ear infections and inability to open the left side of her jaw. She has difficulty opening her mouth, cannot have dental work done due to the lack of motion in the jaw. She has been eating and drinking with little issue. Symptoms have slowly been improving.      Past Medical History:   Past Medical History:   Diagnosis Date    Cancer 03/2018    AML    CHF (congestive heart failure)     Diarrhea 9/14/2018    Encounter for blood transfusion        Current Medications:   Current Outpatient Medications   Medication Sig    escitalopram oxalate (LEXAPRO) 20 MG tablet Take 20 mg by mouth.    ibuprofen (ADVIL,MOTRIN) 600 MG tablet Take 1 tablet (600 mg total) by mouth every 6 (six) hours as needed (left ear/jaw pain).    LORazepam (ATIVAN) 0.5 MG tablet Take 1 tablet (0.5 mg total) by mouth every 6 (six) hours as needed (nausea).    metoprolol succinate (TOPROL XL) 25 MG 24 hr tablet Take 1 tablet (25 mg total) by mouth once daily.    predniSONE (DELTASONE) 10 MG tablet TAKE ONE TABLET BY MOUTH ONCE DAILY AS NEEDED FOR ARTHRITIS FLARE    predniSONE (DELTASONE) 10 MG tablet 5 mg.    kai  (ULTRA-LIGHT ROLLATOR) Misc 1 Units by Misc.(Non-Drug; Combo Route) route once daily.    ALPRAZolam (XANAX) 0.5 MG tablet Take 0.5 tablets (0.25 mg total) by mouth 3 (three) times daily as needed for Insomnia or Anxiety. (Patient not taking: Reported on 11/19/2019)    ofloxacin (FLOXIN) 0.3 % otic solution Place 5 drops into the left ear once daily. (Patient not taking: Reported on 11/19/2019)    omeprazole (PRILOSEC OTC) 20 MG tablet Take 20 mg by mouth every morning.    ondansetron (ZOFRAN) 8 MG tablet Take 1 tablet (8 mg total) by mouth every 12 (twelve) hours as needed for Nausea. (Patient not taking: Reported on 11/19/2019)    ondansetron (ZOFRAN) 8 MG tablet     oxyCODONE-acetaminophen (PERCOCET) 7.5-325 mg per tablet Take 1 tablet by mouth every 4 (four) hours as needed for Pain. (Patient not taking: Reported on 10/22/2019)    traMADol (ULTRAM) 50 mg tablet Take 1 tablet (50 mg total) by mouth every 6 (six) hours as needed for Pain. (Patient not taking: Reported on 10/22/2019)     No current facility-administered medications for this visit.      ALLERGIES:   Review of patient's allergies indicates:   Allergen Reactions    Methotrexate analogues      Elevated Liver Enzyme    Bactrim [sulfamethoxazole-trimethoprim] Nausea And Vomiting and Rash       Review of Systems:     Review of Systems   Constitutional: Positive for fatigue. Negative for appetite change, chills, diaphoresis, fever and unexpected weight change.   HENT:   Negative for hearing loss, mouth sores, nosebleeds, sore throat, trouble swallowing and voice change.    Eyes: Negative for eye problems and icterus.   Respiratory: Negative for chest tightness, cough, hemoptysis, shortness of breath and wheezing.    Cardiovascular: Negative for chest pain, leg swelling and palpitations.   Gastrointestinal: Negative for abdominal distention, abdominal pain, blood in stool, diarrhea, nausea and vomiting.   Endocrine: Negative for hot flashes.    Genitourinary: Negative for bladder incontinence, difficulty urinating, dysuria and hematuria.    Musculoskeletal: Positive for myalgias. Negative for arthralgias, back pain, flank pain, gait problem, neck pain and neck stiffness.   Skin: Negative for itching, rash and wound.   Neurological: Negative for dizziness, extremity weakness, gait problem, headaches, numbness, seizures and speech difficulty.   Hematological: Negative for adenopathy. Does not bruise/bleed easily.   Psychiatric/Behavioral: Positive for sleep disturbance. Negative for confusion and depression. The patient is not nervous/anxious.         Physical Exam:     Vitals:    11/19/19 1311   BP: (!) 118/56   Pulse: 91   Resp: 16   Temp: 98.6 °F (37 °C)       Physical Exam   Constitutional: She is oriented to person, place, and time. She appears well-developed and well-nourished. No distress.   HENT:   Head: Normocephalic and atraumatic.   Mouth/Throat: Oropharynx is clear and moist. No oropharyngeal exudate.   Hair has regrown     Eyes: Pupils are equal, round, and reactive to light. Conjunctivae and EOM are normal.   Neck: Normal range of motion. Neck supple. No JVD present. No tracheal deviation present. No thyromegaly present.   Cardiovascular: Normal rate, regular rhythm and normal heart sounds. Exam reveals no friction rub.   No murmur heard.  Pulmonary/Chest: Effort normal and breath sounds normal. No stridor. No respiratory distress. She has no wheezes. She has no rales. She exhibits no tenderness.   Abdominal: Soft. Bowel sounds are normal. She exhibits no distension. There is no tenderness. There is no rebound and no guarding.   Musculoskeletal: Normal range of motion. She exhibits no edema, tenderness or deformity.   Lymphadenopathy:     She has no axillary adenopathy.   Neurological: She is alert and oriented to person, place, and time. She displays normal reflexes. No cranial nerve deficit or sensory deficit. She exhibits normal muscle  tone. Coordination normal.   Skin: Skin is warm and dry. Capillary refill takes less than 2 seconds. No rash noted. She is not diaphoretic. No erythema. No pallor.   Psychiatric: She has a normal mood and affect. Her behavior is normal. Judgment and thought content normal.       ECOG Performance Status: (foot note - ECOG PS provided by Eastern Cooperative Oncology Group) 1 - Symptomatic but completely ambulatory    Karnofsky Performance Score:  80%- Normal Activity with Effort: Some Symptoms of Disease    Labs:   Lab Results   Component Value Date    WBC 4.83 11/19/2019    HGB 11.1 (L) 11/19/2019    HCT 35.4 (L) 11/19/2019    PLT 67 (L) 11/19/2019    ALT 10 11/19/2019    AST 12 11/19/2019     11/19/2019    K 3.2 (L) 11/19/2019     11/19/2019    CREATININE 0.9 11/19/2019    BUN 12 11/19/2019    CO2 28 11/19/2019    TSH 1.913 10/17/2019    INR 1.1 10/16/2019       Imaging: previous imaging has been reviewed    Assessment and Plan:     Mrs. Mac is a pleasant 69 year old female with recently diagnosed AML.      Acute Monocytic Leukemia  -- Admitted from [facility] on [facility]18 early AM with WBC around 100s, for suspected new non-M3 AML  --lo res HLA typing on 5/26/18 and hi res on 5/27/18 done in anticipation of possible need for future transplant   --Pt with two daughters, two full sisters (in their mid 60s, one with brain aneurysm hx, other one without any medical issues), and one full brother (59 y/o healthy).  --NPM1 +, CEBPA (-), FLT3 (+).  On Midostaurin 50 mg PO BID until Day 14 (held starting 6/8 to 6/11). Stopped when FLAG JENNIFER re-induction started. Restarted Midostaurin at 25 mg bid on day 8 of FLAG JENNIFER induction (6/22), increase to full dose 50 mg bid (6/26) as improvement in liver enzymes. Stopped 7/3/18 due to abnormal cardiac echo.  --day 14 bone marrow biopsy completed 6/11 showing persistent disease with 15% CD 34 positive blasts  --Day 60 of FLAG-JENNIFER, tolerating without difficulty  except nausea/vomiting  --day 14 restaging bone marrow biopsy done 6/29 without complication, results with no evidence of AML, FLT 3 negative, will need repeat marrow at count recovery outpatient   --recovery marrow showed no evidence of disease  --HiDAC #1 on 8/14/18, HiDAC#2 on 9/11/18  --Cycle 3 delayed 1 week due to pancytopenia. Started on 10/16/18  --recovery bone marrow showed no signs of residual leukemia  --Bone marrow biopsy done in May shows no overt signs of recurrent disease    Left sided jaw dysfunction  --MRI ordered  --Would recommend return to ENT    Thrombocytopenia  --Previously in the 70-90 range  --67 today    Suspected Leukemia Cutis  --pathology from dermatology confirmed inflammatory changes  --now resolved with topical steroid cream    Abnormal Liver Function Tests  --ALT and AST and Alk phos have again increased substantially since re-starting midostaurin.   --midostaurin started day 8 at 25 mg bid, monitoring liver enzymes closely and improved. Increasing Midostaurin to 50 mg bid 6/26. Stopped Midostaurin (7/3) due to new diagnosis of systolic heart failure EF 35%.  --6/22/18 liver US showing mild intrahepatic dilation, otherwise impression of hepatic steatosis.  Will hold off of MRCP at this time.    --permanently discontinued midostaurin     Chemotherapy induced cardiomyopathy  --cardiology follow-up as outpatient in 3 months  --repeat 2D echo on 6/15 with preserved EF of 60%.  However 7/2/18 echo with reduced EF 30-35%  --Echo on 8/15/18 was within normal range EF of 60-65%    Rheumatologic issues  --Previously on steroids and methotrexate  --working with [facility]      30 minutes were spent face to face with the patient and her  family to discuss the disease, natural history, treatment options and survival statistics. I have provided the patient with an opportunity to ask questions and have all questions answered to her satisfaction.       she will return to the clinic in 4  weeks, but knows to call in the interim if symptoms change or should a problem arise.        Laquita Troy MD  Hematology and Medical Oncology  Bone Marrow Transplant  [facility]

## 2019-11-25 NOTE — TELEPHONE ENCOUNTER
Spoke to  we scheduled patient to see Dr Padron in [place] on 12/3.        ----- Message from Laquita Troy MD sent at 11/25/2019 10:33 AM CST -----  1. Appointment with ENT [she does not want to go back to Dr. Guan]

## 2019-12-03 ENCOUNTER — OFFICE VISIT (OUTPATIENT)
Dept: OTOLARYNGOLOGY | Facility: CLINIC | Age: 69
End: 2019-12-03
Payer: MEDICARE

## 2019-12-03 VITALS — BODY MASS INDEX: 29.24 KG/M2 | HEIGHT: 64 IN | WEIGHT: 171.31 LBS

## 2019-12-03 DIAGNOSIS — R09.A2 GLOBUS SENSATION: ICD-10-CM

## 2019-12-03 DIAGNOSIS — K21.9 LPRD (LARYNGOPHARYNGEAL REFLUX DISEASE): Primary | ICD-10-CM

## 2019-12-03 DIAGNOSIS — M26.622 LEFT-SIDED TEMPOROMANDIBULAR JOINT PAIN-DYSFUNCTION SYNDROME: ICD-10-CM

## 2019-12-03 DIAGNOSIS — R07.0 THROAT DISCOMFORT: ICD-10-CM

## 2019-12-03 PROCEDURE — 1126F AMNT PAIN NOTED NONE PRSNT: CPT | Mod: ,,, | Performed by: NURSE PRACTITIONER

## 2019-12-03 PROCEDURE — 99215 OFFICE O/P EST HI 40 MIN: CPT | Mod: 25,S$PBB,, | Performed by: NURSE PRACTITIONER

## 2019-12-03 PROCEDURE — 1159F PR MEDICATION LIST DOCUMENTED IN MEDICAL RECORD: ICD-10-PCS | Mod: ,,, | Performed by: NURSE PRACTITIONER

## 2019-12-03 PROCEDURE — 1126F PR PAIN SEVERITY QUANTIFIED, NO PAIN PRESENT: ICD-10-PCS | Mod: ,,, | Performed by: NURSE PRACTITIONER

## 2019-12-03 PROCEDURE — 31575 DIAGNOSTIC LARYNGOSCOPY: CPT | Mod: PBBFAC,PO | Performed by: NURSE PRACTITIONER

## 2019-12-03 PROCEDURE — 99999 PR PBB SHADOW E&M-EST. PATIENT-LVL IV: ICD-10-PCS | Mod: PBBFAC,,, | Performed by: NURSE PRACTITIONER

## 2019-12-03 PROCEDURE — 99999 PR PBB SHADOW E&M-EST. PATIENT-LVL IV: CPT | Mod: PBBFAC,,, | Performed by: NURSE PRACTITIONER

## 2019-12-03 PROCEDURE — 99215 PR OFFICE/OUTPT VISIT, EST, LEVL V, 40-54 MIN: ICD-10-PCS | Mod: 25,S$PBB,, | Performed by: NURSE PRACTITIONER

## 2019-12-03 PROCEDURE — 1159F MED LIST DOCD IN RCRD: CPT | Mod: ,,, | Performed by: NURSE PRACTITIONER

## 2019-12-03 PROCEDURE — 31575 DIAGNOSTIC LARYNGOSCOPY: CPT | Mod: S$PBB,,, | Performed by: NURSE PRACTITIONER

## 2019-12-03 PROCEDURE — 31575 PR LARYNGOSCOPY, FLEXIBLE; DIAGNOSTIC: ICD-10-PCS | Mod: S$PBB,,, | Performed by: NURSE PRACTITIONER

## 2019-12-03 PROCEDURE — 99214 OFFICE O/P EST MOD 30 MIN: CPT | Mod: PBBFAC,PO,25 | Performed by: NURSE PRACTITIONER

## 2019-12-03 RX ORDER — FAMOTIDINE 40 MG/1
40 TABLET, FILM COATED ORAL NIGHTLY
Qty: 30 TABLET | Refills: 11 | Status: SHIPPED | OUTPATIENT
Start: 2019-12-03 | End: 2020-11-09

## 2019-12-03 RX ORDER — OMEPRAZOLE 40 MG/1
40 CAPSULE, DELAYED RELEASE ORAL
Qty: 30 CAPSULE | Refills: 11 | Status: SHIPPED | OUTPATIENT
Start: 2019-12-03 | End: 2021-01-19 | Stop reason: SDUPTHER

## 2019-12-03 NOTE — PATIENT INSTRUCTIONS
"LARYNGOPHARYNGEAL REFLUX  (SILENT OR ATYPICAL REFLUX)    If you have any of the following symptoms you may have laryngopharyngeal reflux (LPR):  hoarseness, thick or too much mucus, chronic throat pain/irritation, chronic throat clearing, chronic cough, especially cough that wake you up from sleep, chronic "postnasal drip" without the need to blow your nose.     Many people with LPR do not have symptoms of heartburn. Compared to the esophagus, the voice box and the back of the throat are significantly more sensitive to the effects of acid on surrounding tissue. Acid passing quickly through the esophagus does not have a chance to irritate the area for too long.  However acid that pools in the throat or voice box can cause prolonged irritation resulting in the symptoms of LPR. In patients known to have LPR, 71% reported hoarseness, 51% reported chronic cough, 47% reported sensation of thickness or lump in the back of the throat, 42% reported chronic throat clearing, and 35% reported trouble swallowing.     Another major symptom of LPR is "postnasal drip."  Patients are often told symptoms are due to abnormal nasal drainage or sinus infection; however this is rarely the cause of chronic throat irritation. For post nasal drip to cause the complaints described, signs and symptoms of an active nasal infection should be present.     Treatments for LPR include:  postural changes, weight reduction, diet modification, medication to reduce stomach acid and promote normal motility, and surgery to prevent reflux. Most patients will begin to notice some relief in her symptoms about 2 weeks after starting the medication; however it is generally recommended the medication should be continued for 2 months. If the symptoms completely resolve, the medication can then be tapered.  Some people will remain symptom free while others may have relapses which required treatment again.    Things you can do to prevent reflux include:  Do not " "smoke.  Smoking will cause reflux.  Avoid tight fitting clothes or belts around the waist.  Avoid vigorous exercise at least 2 hours before bedtime. Avoid eating at least 2 hours prior to bedtime.  In fact avoid eating your largest meal at night.  Weight loss.  For patient's with recent weight gain, shedding a few pounds is all that is required to improve reflux.  Avoid caffeine, cola beverages, citrus beverages, mints, alcoholic beverages, particularly at night, cheese, fried foods, spicy foods, eggs, and chocolate.  Sleep with the head of bed elevated at least 6 inches ("MedCline" wedge pillow).    Recommendations:    Take Prilosec (PPI) every morning on an empty stomach (30-60 minutes before eating) 40 MG.   At bedtime take Pepcid 40 mg (H2-blocker).    After 4-8 weeks, with significant symptomatic improvement, you may begin weaning your reflux medications down:  Prilosec 40 mg --> to 20 mg (over-the-counter strength).  Pepcid 40 mg --> to 20 mg (over-the-counter strength).   Continue to wean as symptoms allow.    See a Gastro doctor (GI) for refractory symptoms and continued management.    "

## 2019-12-03 NOTE — LETTER
December 3, 2019      Laquita Troy MD  [address]  [facility] [address]           [place] - E[facility]  [address] [place] [address]  Phone: [phone]  Fax: [phone]          Patient: Sabrina Mac   MR Number: [id number]   YOB: 1950   Date of Visit: 12/3/2019       Dear Dr. Laquita Troy:    Thank you for referring Sabrina Mac to me for evaluation. Attached you will find relevant portions of my assessment and plan of care.    If you have questions, please do not hesitate to call me. I look forward to following Sabrina Mac along with you.    Sincerely,    Amelie Padron NP    Enclosure  CC:  No Recipients    If you would like to receive this communication electronically, please contact [email or web address] or [phone] to request more information on [facility] access.    For providers and/or their staff who would like to refer a patient to [facility], please contact us through our one-stop-shop provider referral line, [facility], at [phone].    If you feel you have received this communication in error or would no longer like to receive these types of communications, please e-mail [email or web address]

## 2019-12-03 NOTE — PROGRESS NOTES
Subjective:       Patient ID: Sabrina Mac is a 69 y.o. female.    Chief Complaint: No chief complaint on file.    HPI   Two months ago patient was referred to ENT by ASIA Jeff NP for left otalgia, and evaluated by Dr. Jairo Guan in ENT at [facility] on 10/25/19. After cerumen impaction removal, patient was found to have an intact TM AS. Dr. Guan noted a left ear serous otitis media following AOM with rupture of TM. Pt was able to insufflate her middle ear well. Dr. Guan explained that residual effusion would likely resolve spontaneously, and recommended a one-month follow-up, with possible consideration of PE tube if unresolved.   Patient reports left jaw pain, worse with chewing, pain shoots into left ear. Recent MRI showed significant TMJ disease on left. Motrin helps.   Patient reports left sided throat discomfort. No pain, just feels like something is there. She gags on excessive phlegm at night.   Patient is a retired Speech Pathologist. She has AML (remission) and non-differentiated autoimmune disease.     Review of Systems   Constitutional: Negative.    HENT: Positive for ear pain (AS) and postnasal drip.    Eyes: Negative.    Respiratory: Positive for cough and choking.    Cardiovascular: Negative.    Gastrointestinal: Negative.    Musculoskeletal: Negative.    Skin: Negative.    Neurological: Negative.    Hematological: Negative.    Psychiatric/Behavioral: Negative.        Objective:      Physical Exam   Constitutional: She is oriented to person, place, and time. Vital signs are normal. She appears well-developed and well-nourished. She is cooperative. She does not appear ill. No distress.   HENT:   Head: Normocephalic and atraumatic.   Right Ear: Hearing, tympanic membrane, external ear and ear canal normal. Tympanic membrane is not erythematous. No middle ear effusion.   Left Ear: Hearing, tympanic membrane, external ear and ear canal normal. Tympanic membrane is not erythematous.  No middle  ear effusion.   Nose: Nose normal. No mucosal edema or rhinorrhea. Right sinus exhibits no maxillary sinus tenderness and no frontal sinus tenderness. Left sinus exhibits no maxillary sinus tenderness and no frontal sinus tenderness.   Mouth/Throat: Uvula is midline, oropharynx is clear and moist and mucous membranes are normal. Mucous membranes are not pale, not dry and not cyanotic. No oral lesions. No oropharyngeal exudate, posterior oropharyngeal edema or posterior oropharyngeal erythema.   Eyes: Pupils are equal, round, and reactive to light. Conjunctivae, EOM and lids are normal. Right eye exhibits no discharge. Left eye exhibits no discharge. No scleral icterus.   Neck: Trachea normal and normal range of motion. Neck supple. No tracheal deviation present. No thyroid mass and no thyromegaly present.   Cardiovascular: Normal rate.   Pulmonary/Chest: Effort normal. No stridor. No respiratory distress. She has no wheezes.   Musculoskeletal: Normal range of motion.   Lymphadenopathy:        Head (right side): No submental, no submandibular, no tonsillar, no preauricular and no posterior auricular adenopathy present.        Head (left side): No submental, no submandibular, no tonsillar, no preauricular and no posterior auricular adenopathy present.     She has no cervical adenopathy.        Right cervical: No superficial cervical and no posterior cervical adenopathy present.       Left cervical: No superficial cervical and no posterior cervical adenopathy present.   Neurological: She is alert and oriented to person, place, and time. She has normal strength. Coordination and gait normal.   Skin: Skin is warm, dry and intact. No lesion and no rash noted. She is not diaphoretic. No cyanosis. No pallor.   Psychiatric: She has a normal mood and affect. Her speech is normal and behavior is normal. Judgment and thought content normal. Cognition and memory are normal.   Nursing note and vitals reviewed.      Procedure:  Flexible laryngoscopy    In order to fully examine the upper aerodigestive tract, including the larynx, in a patient with a hyperactive gag reflex, and suboptimal visualization with indirect mirror exam,  flexible endoscopy is required.   After explaining the procedure and obtaining verbal consent, a timeout was performed with the patient's participation according to the universal protocol. Both nasal cavities were anesthetized with 4% Xylocaine spray mixed with Gordon-Synephrine. The flexible laryngoscope  was inserted into the nasal cavity and advanced to visualize the nasal cavity, nasopharynx, the posterior oropharynx, hypopharynx, and the endolarynx with the  findings noted. The scope was removed and the procedure terminated. The patient tolerated this procedure well without apparent complication.     OVERALL FINDINGS  Nasopharynx - the torus is clear. There are no lesions of the posterior wall.   Oropharynx - no lesions of the tongue base. There is no obvious fullness or asymmetry.  Hypopharynx - there are no lesions of the pyriform sinuses or postcricoid region   Larynx - there are no lesions of the supraglottic or glottic larynx.  Vocal fold mobility is normal.     SPECIFIC FINDINGS  Adenoid tissue - normal   Nasopharynx & eustachian tube orifices - normal   Posterior pharyngeal wall - normal   Base of tongue - normal   Epiglottis - normal   Valleculae - normal   Pyriform sinuses - normal   False vocal cords - normal   True vocal cords - normal  Arytenoids - normal   Interarytenoid space - edema, erythema  Right BOT / pharynx    Left BOT / pharynx    Larynx    Larynx     Assessment:     Left TMJ disease    LPRD  Plan:     Discussed TMJ    Advised/Cautioned: The results of today's ENT exam and flexible endoscopy were detailed to the patient and all questions were answered. Patient education centered around GERD, known exacerbants and contemporary treatment options. Laryngoscope photos were given to the patient.  Handouts given on LPRD and GERD were given to the patient. After review of these, patient elected to be placed on PPI 40 mg QAM on an empty stomach for the next 6-8 weeks, and H2-blocker QHS. I encouraged the patient once she has completed the evening meal to not snack or consume any other food products or caffeinated beverages for at least  minutes before retiring. Finally, I encouraged the patient to sleep about 30 degrees above horizontal, and this can be facilitated by using 2-3 pillows or a wedge foam product. If the patient is not demonstrably improved in 6-8 weeks, consultation with gastroenterology may be indicated to rule out intrinsic disease in the lower esophagus, stomach, or proximal duodenum.

## 2019-12-05 NOTE — ED TRIAGE NOTES
"SBAR Documentation: Situation, Background, Assessment, Recommendation     Situation    Outgoing call to follow up on patient status. Patient states she is doing ok but needs some help to manage her health.    Background          Assessment    Patient reports she saw Victor Hugo Manzanares at at [facility] Monday 12/2/19, she was started on vitamin B12. Patient states her main health concern is not being able to \"keep up\" with friends and family when in the community due to shortness of breath and needing frequent breaks. Patient only has home concentrator but no portable oxygen.     TC to [facility], patient's CPAP is on backorder. No other action required from patient/providers until machine is in stock, then [facility] will call patient to schedule pickup/delivery. Patient will need order from provider for portable tanks. Message left for LENNY Locke requesting mini portable tanks. Patient states she lives on the second floor and has to use both handrails to steady self while using stairs and is concerned for her safety if using a full sized rolling tank. Patient has not had any falls in the last year but is concerned she will fall when out in the community. Patient does not use any ambulatory aids. Patient has follow up with pulmonary/sleep medicine in February. Patient unsure of last cardiology appointment or if follow up is required for cardiomegaly. Will discuss with LENNY GRANADOS.    Recommendation Patient is open to more information on [facility] paramedicine program for COPD-HAMLETW Pushpa will submit referral. CCM RN will obtain more information on pulmonary rehab for patient.   Escalation Protocol: [facility] Evaluation and Treatment (Comment: [facility] Paramed[facility] program)     " Pt arrives with complaint of sore throat, ear pain bilaterally, decreased hearing ability bilaterally, and hallucinations x2 days.  Pt reports sore throat and ear pain began 2 weeks ago. Pt daughter reports pt was prescribed amoxicillin 875 mg 2x daily 8/7/2019, completed course on 10/14/2019. Pt daughter reports pt eardrum rupture 8/7/2019.

## 2019-12-16 ENCOUNTER — LAB VISIT (OUTPATIENT)
Dept: LAB | Facility: HOSPITAL | Age: 69
End: 2019-12-16
Payer: MEDICARE

## 2019-12-16 ENCOUNTER — OFFICE VISIT (OUTPATIENT)
Dept: HEMATOLOGY/ONCOLOGY | Facility: CLINIC | Age: 69
End: 2019-12-16
Payer: MEDICARE

## 2019-12-16 ENCOUNTER — OFFICE VISIT (OUTPATIENT)
Dept: RHEUMATOLOGY | Facility: CLINIC | Age: 69
End: 2019-12-16
Payer: MEDICARE

## 2019-12-16 VITALS
TEMPERATURE: 98 F | RESPIRATION RATE: 16 BRPM | SYSTOLIC BLOOD PRESSURE: 119 MMHG | BODY MASS INDEX: 29.63 KG/M2 | OXYGEN SATURATION: 97 % | DIASTOLIC BLOOD PRESSURE: 58 MMHG | HEART RATE: 82 BPM | WEIGHT: 172.63 LBS

## 2019-12-16 VITALS
DIASTOLIC BLOOD PRESSURE: 63 MMHG | BODY MASS INDEX: 30.07 KG/M2 | SYSTOLIC BLOOD PRESSURE: 107 MMHG | HEART RATE: 86 BPM | HEIGHT: 64 IN | WEIGHT: 176.13 LBS

## 2019-12-16 DIAGNOSIS — I42.7 CHEMOTHERAPY INDUCED CARDIOMYOPATHY: ICD-10-CM

## 2019-12-16 DIAGNOSIS — Z79.52 LONG TERM SYSTEMIC STEROID USER: ICD-10-CM

## 2019-12-16 DIAGNOSIS — C92.00 AML (ACUTE MYELOBLASTIC LEUKEMIA): ICD-10-CM

## 2019-12-16 DIAGNOSIS — C93.01 ACUTE MONOCYTIC LEUKEMIA IN REMISSION: ICD-10-CM

## 2019-12-16 DIAGNOSIS — C92.00 ACUTE MYELOID LEUKEMIA NOT HAVING ACHIEVED REMISSION: ICD-10-CM

## 2019-12-16 DIAGNOSIS — C92.00 ACUTE MYELOID LEUKEMIA NOT HAVING ACHIEVED REMISSION: Primary | ICD-10-CM

## 2019-12-16 DIAGNOSIS — T45.1X5A CHEMOTHERAPY INDUCED CARDIOMYOPATHY: ICD-10-CM

## 2019-12-16 DIAGNOSIS — Z86.19 HISTORY OF CLOSTRIDIUM DIFFICILE INFECTION: ICD-10-CM

## 2019-12-16 DIAGNOSIS — M25.50 POLYARTHRALGIA: Primary | ICD-10-CM

## 2019-12-16 LAB
ABO + RH BLD: NORMAL
ALBUMIN SERPL BCP-MCNC: 3.8 G/DL (ref 3.5–5.2)
ALP SERPL-CCNC: 84 U/L (ref 55–135)
ALT SERPL W/O P-5'-P-CCNC: 6 U/L (ref 10–44)
ANION GAP SERPL CALC-SCNC: 9 MMOL/L (ref 8–16)
AST SERPL-CCNC: 13 U/L (ref 10–40)
BILIRUB SERPL-MCNC: 0.4 MG/DL (ref 0.1–1)
BLD GP AB SCN CELLS X3 SERPL QL: NORMAL
BUN SERPL-MCNC: 15 MG/DL (ref 8–23)
CALCIUM SERPL-MCNC: 9.5 MG/DL (ref 8.7–10.5)
CHLORIDE SERPL-SCNC: 106 MMOL/L (ref 95–110)
CK SERPL-CCNC: 22 U/L (ref 20–180)
CO2 SERPL-SCNC: 25 MMOL/L (ref 23–29)
CREAT SERPL-MCNC: 1 MG/DL (ref 0.5–1.4)
ERYTHROCYTE [DISTWIDTH] IN BLOOD BY AUTOMATED COUNT: 14.5 % (ref 11.5–14.5)
ERYTHROCYTE [SEDIMENTATION RATE] IN BLOOD BY WESTERGREN METHOD: 28 MM/HR (ref 0–36)
EST. GFR  (AFRICAN AMERICAN): >60 ML/MIN/1.73 M^2
EST. GFR  (NON AFRICAN AMERICAN): 58 ML/MIN/1.73 M^2
GLUCOSE SERPL-MCNC: 102 MG/DL (ref 70–110)
HCT VFR BLD AUTO: 36.3 % (ref 37–48.5)
HGB BLD-MCNC: 11.5 G/DL (ref 12–16)
IMM GRANULOCYTES # BLD AUTO: 0.05 K/UL (ref 0–0.04)
MAGNESIUM SERPL-MCNC: 1.7 MG/DL (ref 1.6–2.6)
MCH RBC QN AUTO: 32.8 PG (ref 27–31)
MCHC RBC AUTO-ENTMCNC: 31.7 G/DL (ref 32–36)
MCV RBC AUTO: 103 FL (ref 82–98)
NEUTROPHILS # BLD AUTO: 4.5 K/UL (ref 1.8–7.7)
PHOSPHATE SERPL-MCNC: 3.6 MG/DL (ref 2.7–4.5)
PLATELET # BLD AUTO: 108 K/UL (ref 150–350)
PMV BLD AUTO: 10.1 FL (ref 9.2–12.9)
POTASSIUM SERPL-SCNC: 4.1 MMOL/L (ref 3.5–5.1)
PROT SERPL-MCNC: 7 G/DL (ref 6–8.4)
RBC # BLD AUTO: 3.51 M/UL (ref 4–5.4)
SODIUM SERPL-SCNC: 140 MMOL/L (ref 136–145)
WBC # BLD AUTO: 6.53 K/UL (ref 3.9–12.7)

## 2019-12-16 PROCEDURE — 82550 ASSAY OF CK (CPK): CPT

## 2019-12-16 PROCEDURE — 1159F PR MEDICATION LIST DOCUMENTED IN MEDICAL RECORD: ICD-10-PCS | Mod: ,,, | Performed by: INTERNAL MEDICINE

## 2019-12-16 PROCEDURE — 99999 PR PBB SHADOW E&M-EST. PATIENT-LVL IV: ICD-10-PCS | Mod: PBBFAC,,, | Performed by: INTERNAL MEDICINE

## 2019-12-16 PROCEDURE — 1159F MED LIST DOCD IN RCRD: CPT | Mod: ,,, | Performed by: INTERNAL MEDICINE

## 2019-12-16 PROCEDURE — 99999 PR PBB SHADOW E&M-EST. PATIENT-LVL IV: CPT | Mod: PBBFAC,,, | Performed by: INTERNAL MEDICINE

## 2019-12-16 PROCEDURE — 1125F AMNT PAIN NOTED PAIN PRSNT: CPT | Mod: ,,, | Performed by: INTERNAL MEDICINE

## 2019-12-16 PROCEDURE — 84100 ASSAY OF PHOSPHORUS: CPT

## 2019-12-16 PROCEDURE — 36415 COLL VENOUS BLD VENIPUNCTURE: CPT

## 2019-12-16 PROCEDURE — 1126F PR PAIN SEVERITY QUANTIFIED, NO PAIN PRESENT: ICD-10-PCS | Mod: ,,, | Performed by: INTERNAL MEDICINE

## 2019-12-16 PROCEDURE — 99214 OFFICE O/P EST MOD 30 MIN: CPT | Mod: S$PBB,,, | Performed by: INTERNAL MEDICINE

## 2019-12-16 PROCEDURE — 80053 COMPREHEN METABOLIC PANEL: CPT

## 2019-12-16 PROCEDURE — 99214 OFFICE O/P EST MOD 30 MIN: CPT | Mod: PBBFAC,25 | Performed by: INTERNAL MEDICINE

## 2019-12-16 PROCEDURE — 99215 OFFICE O/P EST HI 40 MIN: CPT | Mod: S$PBB,,, | Performed by: INTERNAL MEDICINE

## 2019-12-16 PROCEDURE — 99214 OFFICE O/P EST MOD 30 MIN: CPT | Mod: PBBFAC,25,27 | Performed by: INTERNAL MEDICINE

## 2019-12-16 PROCEDURE — 99214 PR OFFICE/OUTPT VISIT, EST, LEVL IV, 30-39 MIN: ICD-10-PCS | Mod: S$PBB,,, | Performed by: INTERNAL MEDICINE

## 2019-12-16 PROCEDURE — 1126F AMNT PAIN NOTED NONE PRSNT: CPT | Mod: ,,, | Performed by: INTERNAL MEDICINE

## 2019-12-16 PROCEDURE — 1125F PR PAIN SEVERITY QUANTIFIED, PAIN PRESENT: ICD-10-PCS | Mod: ,,, | Performed by: INTERNAL MEDICINE

## 2019-12-16 PROCEDURE — 85652 RBC SED RATE AUTOMATED: CPT

## 2019-12-16 PROCEDURE — 83735 ASSAY OF MAGNESIUM: CPT

## 2019-12-16 PROCEDURE — 99215 PR OFFICE/OUTPT VISIT, EST, LEVL V, 40-54 MIN: ICD-10-PCS | Mod: S$PBB,,, | Performed by: INTERNAL MEDICINE

## 2019-12-16 PROCEDURE — 85027 COMPLETE CBC AUTOMATED: CPT

## 2019-12-16 PROCEDURE — 86901 BLOOD TYPING SEROLOGIC RH(D): CPT

## 2019-12-16 RX ORDER — PREDNISONE 2.5 MG/1
TABLET ORAL
Qty: 120 TABLET | Refills: 0 | Status: SHIPPED | OUTPATIENT
Start: 2019-12-16 | End: 2020-09-03 | Stop reason: SDUPTHER

## 2019-12-16 RX ORDER — DIPHENHYDRAMINE HCL 50 MG
50 CAPSULE ORAL NIGHTLY PRN
Status: ON HOLD | COMMUNITY
End: 2021-02-10

## 2019-12-16 NOTE — PROGRESS NOTES
Subjective:       Patient ID: Sabrina Mac is a 68 y.o. female.    Chief Complaint: Disease Management    HPI   68 year old F with PMH of  AML s/p chemo in 2018, CHF, thrombocytopenia,undifferentiated connective tissue disease, osteoporosis here for evaluation.  IN September 2015, she was diagnosed with undifferentiated connective tissue disease. When she was diagnosed with connective tissue disease, she was having pain all over body.  She was having trouble getting dressed and getting up from chair. She reports she had +CORTES and +RF.  Denies ever having swelling. She was put on MTX and developed significant transaminitis and nausea. When she was on chemo, she did not have any joint pain. She finished chemotherapy in October, then her joints started getting worse. She was put on prednisone 10mg a day since march. She has pain in left shoulder and both knees. Pain level can be as high as 9/10,aching, non radiating.  Denies hip pain, headaches, or jaw claudication. Denies weakness.     Interval history: she was doing well and felt worse 3 weeks ago.  Reports that her joint pain migrates. Reports that she is on prednisone 10mg a day.  Reports she has pain in knees and ankles.  Denies joint swelling.     Past Medical History:   Diagnosis Date    Cancer 03/2018    AML    CHF (congestive heart failure)     Diarrhea 9/14/2018    Encounter for blood transfusion        Review of Systems   Constitutional: Negative for activity change, appetite change, chills, diaphoresis, fatigue, fever and unexpected weight change.   HENT: Negative for congestion, ear discharge, ear pain, facial swelling, mouth sores, sinus pressure, sneezing, sore throat, tinnitus and trouble swallowing.    Eyes: Negative for photophobia, pain, discharge, redness, itching and visual disturbance.   Respiratory: Negative for apnea, chest tightness, shortness of breath, wheezing and stridor.    Cardiovascular: Negative for leg swelling.  "  Gastrointestinal: Negative for abdominal distention, abdominal pain, anal bleeding, blood in stool, constipation, diarrhea and nausea.   Endocrine: Negative for cold intolerance and heat intolerance.   Genitourinary: Negative for difficulty urinating and dysuria.   Musculoskeletal: Positive for arthralgias and joint swelling. Negative for back pain, gait problem, myalgias, neck pain and neck stiffness.   Skin: Negative for color change, pallor, rash and wound.   Neurological: Negative for dizziness, seizures, light-headedness and numbness.   Hematological: Negative for adenopathy. Does not bruise/bleed easily.   Psychiatric/Behavioral: Negative for sleep disturbance. The patient is not nervous/anxious.            Objective:   /70   Pulse 87   Ht 5' 2" (1.575 m)   Wt 67.6 kg (149 lb 0.5 oz)   LMP  (LMP Unknown)   BMI 27.26 kg/m²      Physical Exam   Constitutional: She is oriented to person, place, and time.   HENT:   Head: Normocephalic and atraumatic.   Right Ear: External ear normal.   Left Ear: External ear normal.   Nose: Nose normal.   Mouth/Throat: Oropharynx is clear and moist. No oropharyngeal exudate.   Eyes: Conjunctivae and EOM are normal. Pupils are equal, round, and reactive to light. Right eye exhibits no discharge. Left eye exhibits no discharge. No scleral icterus.   Neck: Neck supple. No JVD present. No thyromegaly present.   Cardiovascular: Normal rate, regular rhythm, normal heart sounds and intact distal pulses.  Exam reveals no gallop and no friction rub.    No murmur heard.  Pulmonary/Chest: Effort normal and breath sounds normal. No respiratory distress. She has no wheezes. She has no rales. She exhibits no tenderness.   Abdominal: Soft. Bowel sounds are normal. She exhibits no distension and no mass. There is no tenderness. There is no rebound and no guarding.   Lymphadenopathy:     She has no cervical adenopathy.   Neurological: She is alert and oriented to person, place, and " time. No cranial nerve deficit. Gait normal. Coordination normal.   Skin: Skin is dry. No rash noted. No erythema. No pallor.     Psychiatric: Affect and judgment normal.   Musculoskeletal: She exhibits no edema, tenderness or deformity.   Trouble rising from chair  Trouble making a fist with both hands       labs: reviewed     Assessment:   69 year old F with PMH of  AML s/p chemo in 2018, CHF, thrombocytopenia,undifferentiated connective tissue disease, osteoporosis here for evaluation.    She reports being diagnosed with undifferentiated connective tissue disease when she was presented with arthralgias. Reports she felt the best while on chemo when it comes to her joints.  She has +CORTES but no other features of connective tissue disease. She came in to me on prednisone. I told patient and daughter that I need to see how she is off prednisone to better determine her diagnosis.      No diagnosis found.        Plan:      Wean prednisone from 10mg a day to  7.5 mg for 2 weeks and then 5mg daily for 2 weeks and then 5 mg daily  NO MTX given reaction including transaminitis *

## 2019-12-17 ENCOUNTER — PATIENT MESSAGE (OUTPATIENT)
Dept: RHEUMATOLOGY | Facility: CLINIC | Age: 69
End: 2019-12-17

## 2019-12-17 NOTE — PROGRESS NOTES
Hematology and Medical Oncology   Follow Up Note     12/16/2019    Primary Oncologic Diagnosis: AML, FLT 3 + and NPMN1+.   History of Present Ilness:   Sabrina Badillo) is a pleasant 69 y.o.female presents to clinic following 2 induction therapies for AML. She was in the hospital roughly 50 days to receive 7+3 and then FLAG JENNIFER.    Oncology History:   68 y.o. female admitted overnight for possible new AML. Came in from OSH with elevated WBC of 100.   05/25/2018: Pt is now on hydrea 2 grams BID, allopurinol 300 mg daily, and IVF. Pt may need rasburicase this weekend. She will undergo a BM bx and aspiration today. She is an induction candidate and will not be screened for research trials. She has anxiety for which she usually takes xanax, this was re-started.   05/26/2018 PICC placed. Reports she slept well last night. Anxiety improved with prn xanax. EF 65%, HIV and Hep panel negative. 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  6/12/2018: Day 15 of 7+3 induction, restaging BM bx done yesterday. On Midostaurin. Fever yesterday and BC with gram + cocci in clusters. On cefepime day 2 and will start Vanc today. Labial mucositis improving.   6/13/2018: Day 16 7+3. BC with staph aureus, identification pending. Surveillance blood cultures done today. Afebrile x 48 hours. On cefepime and Vanc. Labial mucositis resolved. No complaints of pain. Nausea controlled. No diarrhea. Primary complaint is fatigue.   6/14/20018: Day 17 of 7+3. Day 14 bone marrow results pending. BC with staph epidermis only sensitive to Vancomycin. Vanc day 3 and cefepime day 4. Vanc trough 12.2 last night. BP remains low but stable. Afebrile x 72 hours.   6/15/2018: Day 18 of 7+3. Day 14 bone marrow biopsy shows persistent disease with 15% blasts. Discussion with patient regarding treatment and she is deciding if she wants to proceed with re-induction vs outpatient maintenance. Temp of 100.4 overnight, unsustained. Day 5 Cefepime and Day 4 of  Vanc for staph epidermis. Repeat cultures NGTD. BP improved. Electrolytes (mag, phos, K and calcium) replaced aggressively this am and will repeat labs this afternoon. No complaints this am.   06/16/2018: Day 19 of 7+3. Day 2 of Flag-JENNIFER. Remains afebrile. Calcium remains low and have increased PO supplementation. Remains on vanc and aztreonam. Somewhat loopy feeling after receiving benadryl.   06/17/2018: Day 20 of 7+3. Day 3 of FLAG-JENNIFER. Multiple electrolyte abnormalities. New bilateral leg and feet swelling.   6/18/2018: Day 21 of 7+3 and Day 4 of FLAG JENNIFER. Tolerating well with some nausea controlled with Zofran. VSS, afebrile. ANC 1900 on Neupogen. Continues Vanc for staph epi bacteremia. Multiple electrolytes replaced today. Complains of edema to lower extremities, not improved after 20 of lasix yesterday. No sob or CP.   6/19/18: Day 22 of 7+3 and Day 5 of FLAG JENNIFER. VSS, afebrile, ANC 3900. Vanc trough elevated and dose adjusted this am. Lower extremity edema improved after 40 of lasix yesterday, net negative 300 cc I&O. Mild nausea, no abdominal pain or diarrhea. Poor appetite but no difficulty eating or taking pills.   6/20/18: Day 23 of 7+3 and Day 6 of FLAG JENNIFER. Patient complains of nausea and vomiting overnight and this am, especially when taking pills. Will add Zyprexa daily in addition to Zofran, compazine, and ativan PRN and will change meds to IV. 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 Afebrile.  No diarrhea, mouth sores or pain.  06/21/2018: Day 24 of 7+3 and Day 7 of FLAG JENNIFER. Nausea better controlled. Continued on Vanc.  6/22/2018: Day 25 of 7+3 and Day 8 of FLAG JENNIFER. Nausea improved some with Zyprexa but did have 1 episode of emesis overnight.continuing meds IV due to vomiting. Remains afebrile. ANC 0. Continues Vanc and Flagyl. Electrolytes replaced.   06/23/2018 : day 26 of 7+3 and Day 9 of FLAG JENNIFER.  Nausea persists,  worsened after taking pills so meds converted to IV.  Encouraged ambulation.  Continue with flagyl for leptotrichia goodfellowii.  RUQ US showed intrahepatic dilation, overall impression hepatic steatosis.  CBD 0.5cm.  No Gallbladder.    06/24/2018 : day 27 of 7+3 and Day 10 of FLAG JENNIFER.  Nausea persisting, able to tolerate some po pills.  Last day of flagyl (day 5).  Still pancytopenic. Appetite still low as po intake triggers nausea.  06/25/2018: day 28 of 7+3 and Day 11 of FLAG JENNIFER. Afebrile, ANC 0. Has completed Flagyl. Will decrease Vanc to 1000 mg q12, trough 19.9, will complete Vanc on 6/27. Liver enzymes stable on Midostaurin. VSS.   6/26/2018: day 29 of 7+3 and day 12 of FLAG JENNIFER. Liver enzymes improved and Midostaurin increased to full dose 50 mg bid. Nausea controlled, afebrile, VSS, NEON.  6/27/2018: day 30 of 7+3 and Day 13 of FLAG JENNIFER. Persistent nausea and emesis x 1 yesterday. Compazine changed to scheduled. Vanc ends today. Afebrile, VSS. Aggressive electrolyte replacement, low electrolytes possibly due to Midostaurin.   6/28/2018: day 31 of 7+3 and Day 14 of FLAG JENNIFER. Nausea improved with scheduled Compazine. Patient has agreed to start converting some IV meds to po. VSS, afebrile, electrolytes wnl today. Only complains of fatigue, new rash noted to upper back and chest and legs, papular rash mildly red.  6/29/2018: Day 32 of 7+3 and Day 15 of FLAG JENNIFER. Day 14 restaging bone marrow biopsy done at bedside today. Patient states nausea improved but she did have emesis last night after taking 9 pm pills. Rash improved. No mouth sores, diarrhea or pain.   7/2/2018: Day 35 of 7+3 and Day 18 of FLAG JENNIFER. Restaging BM bx results pending. Fluid overload over the weekend. Chest x-ray with pulmonary edema. Os sats down to 88%. Placed on O2 at 2 L NC, off O2 this am. Received lasix. Net negative 2.7 L today. 1 episode of nausea, no emesis. Reports anxiety relieved with PRN meds. Electrolytes improved,  afebrile, VSS.   7/3/2018: Day 36 of 7+3 and Day 19 of FLAG JENNIFER. Restaging Bm bx with GABRIEL. Cardiology consulted for abnormal echo, EF 30% with pulmonary HTN and severe L atrial enlargement. EKG with T wave abnormality, possible anterolateral ischemia and prolonged QTc of 530. Medications adjusted. Nausea controlled, no diarrhea. Electrolytes improved. On O2 as needed.   07/04/2018 : Day 37 of 7+3 and Day 20 of FLAG JENNIFER Diarrhea in AM, watery, no associated abdominal pain, nausea, or vomiting.    07/05/2018: Day 38 of 7+3 ane Day 21 of FLAG JENNIFER. No CP or SOB, cardiology following. On RA. All meds changed to po, denies nausea or vomiting and no diarrhea. VSS, afebrile.   7/6/2018: Day 22 of FLAG JENNIFER. Continues to tolerate po meds with no nausea. Afebrile. Awaiting count recovery for discharge.  7/7/2018-/8/2018: Day 40/41 of 7+3, Day 23/24 of FLAG JENNIFER.  Improving clinically.  Started on mag oxide per patient request.  Labs showing signs of ANC recovery.  7/9/2018: Day 25 FLAG JENNIFER, , continues Neupogen. Received platelets today. Afebrile, VSS. Tolerating all oral meds with no nausea or vomiting. Only complains of fatigue.   7/10/2018: Day 26 FLAG JENNIFER,  today. Had 2 episodes of vomiting and diarrhea last night. Feeling better. Afebrile, VSS.   7/11/2018: Day 27 FLAG JENNIFER,  today. No vomiting or diarrhea overnight and no nausea. Afebrile, VSS. Complains of back pain (bone pain) suspect due to count recovery. Relieved with oxy IR and Zyrtec started.  07/12/2018: Day 28 flag jennifer, engrafted today with ANC of 730. Back pain improved with zyrtec. Scheduled for IT chemo tomorrow at 1:30 pm and discharge home thereafter. Will likely need plt transfusion prior to IT chemo tomorrow   7/13/2018: Day 29 of FLAG JENNIFER. ANC up to 1300 today. Transfusing platelets today prior to LP for IT chemo. Patient continues to complain of bone pain, mostly to back and legs. No nausea, diarrhea, sob. Afebrile and VSS.    --hospitalized 7/23-7/25 for C.diff diarrhea, neutropenic fever.  While inpatient she developed pink blisters on her palm that were concerning for relapse.  --palm biopsy on 7/26/18 for suspicion of leukemia cutis was also negative for sign of relapse  --bone marrow biopsy 7/31/18 was negative for signs of relapse   --Admitted on 8/14/18 for HiDAC#1  --Admitted on 9/11/18 for HiDAC#2  --Admitted on 10/16/18 for HiDAC#3  --Post consolidation bone marrow biopsy on 12/27/2018: AML FISH (BM) Interpretation: The result is within normal limits for the AML FISH panel. No evidence of residual disease.  --Bone Marrow Biopsy on 5/9/19 NO MORPHOLOGIC EVIDENCE OF RESIDUAL ACUTE MYELOID LEUKEMIA. RECOMMEND NGS STUDY TO RULE OUT RESIDUAL DISEASE. deletion 20q    Interval History:   Mrs. Wilkes is doing better. She feels she can open her jaw without pain. Overall things are going well. Her energy and appetite have improved. She denies excessive fatigue or mouth blisters.    Jesi is retiring at the end of the year and they are planning a trip between [place] and [place].      Past Medical History:   Past Medical History:   Diagnosis Date    Cancer 03/2018    AML    CHF (congestive heart failure)     Diarrhea 9/14/2018    Encounter for blood transfusion        Current Medications:   Current Outpatient Medications   Medication Sig    ALPRAZolam (XANAX) 0.5 MG tablet Take 0.5 tablets (0.25 mg total) by mouth 3 (three) times daily as needed for Insomnia or Anxiety.    diphenhydrAMINE (BENADRYL) 50 MG capsule Take 50 mg by mouth nightly as needed for Itching or Insomnia.    escitalopram oxalate (LEXAPRO) 20 MG tablet Take 20 mg by mouth.    famotidine (PEPCID) 40 MG tablet Take 1 tablet (40 mg total) by mouth nightly.    ibuprofen (ADVIL,MOTRIN) 600 MG tablet Take 1 tablet (600 mg total) by mouth every 6 (six) hours as needed (left ear/jaw pain).    LORazepam (ATIVAN) 0.5 MG tablet Take 1 tablet (0.5 mg total) by mouth every  6 (six) hours as needed (nausea).    metoprolol succinate (TOPROL XL) 25 MG 24 hr tablet Take 1 tablet (25 mg total) by mouth once daily.    ofloxacin (FLOXIN) 0.3 % otic solution Place 5 drops into the left ear once daily.    omeprazole (PRILOSEC) 40 MG capsule Take 1 capsule (40 mg total) by mouth before breakfast. Take on empty stomach 30-60 minutes before meal    ondansetron (ZOFRAN) 8 MG tablet Take 1 tablet (8 mg total) by mouth every 12 (twelve) hours as needed for Nausea.    ondansetron (ZOFRAN) 8 MG tablet     oxyCODONE-acetaminophen (PERCOCET) 7.5-325 mg per tablet Take 1 tablet by mouth every 4 (four) hours as needed for Pain.    predniSONE (DELTASONE) 10 MG tablet TAKE ONE TABLET BY MOUTH ONCE DAILY AS NEEDED FOR ARTHRITIS FLARE    predniSONE (DELTASONE) 10 MG tablet 5 mg.    traMADol (ULTRAM) 50 mg tablet Take 1 tablet (50 mg total) by mouth every 6 (six) hours as needed for Pain.    walker (ULTRA-LIGHT ROLLATOR) Misc 1 Units by Misc.(Non-Drug; Combo Route) route once daily.    omeprazole (PRILOSEC OTC) 20 MG tablet Take 20 mg by mouth every morning.    predniSONE (DELTASONE) 2.5 MG tablet Take prednisone 3 pills daily for 2 weeks and then 2 tablets daily for 2 weeks and the one tablet daily     No current facility-administered medications for this visit.      ALLERGIES:   Review of patient's allergies indicates:   Allergen Reactions    Methotrexate analogues      Elevated Liver Enzyme    Bactrim [sulfamethoxazole-trimethoprim] Nausea And Vomiting and Rash       Review of Systems:     Review of Systems   Constitutional: Positive for fatigue. Negative for appetite change, chills, diaphoresis, fever and unexpected weight change.   HENT:   Negative for hearing loss, mouth sores, nosebleeds, sore throat, trouble swallowing and voice change.    Eyes: Negative for eye problems and icterus.   Respiratory: Negative for chest tightness, cough, hemoptysis, shortness of breath and wheezing.     Cardiovascular: Negative for chest pain, leg swelling and palpitations.   Gastrointestinal: Negative for abdominal distention, abdominal pain, blood in stool, diarrhea, nausea and vomiting.   Endocrine: Negative for hot flashes.   Genitourinary: Negative for bladder incontinence, difficulty urinating, dysuria and hematuria.    Musculoskeletal: Positive for myalgias. Negative for arthralgias, back pain, flank pain, gait problem, neck pain and neck stiffness.   Skin: Negative for itching, rash and wound.   Neurological: Negative for dizziness, extremity weakness, gait problem, headaches, numbness, seizures and speech difficulty.   Hematological: Negative for adenopathy. Does not bruise/bleed easily.   Psychiatric/Behavioral: Positive for sleep disturbance. Negative for confusion and depression. The patient is not nervous/anxious.         Physical Exam:     Vitals:    12/16/19 1447   BP: (!) 119/58   Pulse: 82   Resp: 16   Temp: 97.8 °F (36.6 °C)       Physical Exam   Constitutional: She is oriented to person, place, and time. She appears well-developed and well-nourished. No distress.   HENT:   Head: Normocephalic and atraumatic.   Mouth/Throat: Oropharynx is clear and moist. No oropharyngeal exudate.   Hair has regrown     Eyes: Pupils are equal, round, and reactive to light. Conjunctivae and EOM are normal.   Neck: Normal range of motion. Neck supple. No JVD present. No tracheal deviation present. No thyromegaly present.   Cardiovascular: Normal rate, regular rhythm and normal heart sounds. Exam reveals no friction rub.   No murmur heard.  Pulmonary/Chest: Effort normal and breath sounds normal. No stridor. No respiratory distress. She has no wheezes. She has no rales. She exhibits no tenderness.   Abdominal: Soft. Bowel sounds are normal. She exhibits no distension. There is no tenderness. There is no rebound and no guarding.   Musculoskeletal: Normal range of motion. She exhibits no edema, tenderness or  deformity.   Lymphadenopathy:     She has no axillary adenopathy.   Neurological: She is alert and oriented to person, place, and time. She displays normal reflexes. No cranial nerve deficit or sensory deficit. She exhibits normal muscle tone. Coordination normal.   Skin: Skin is warm and dry. Capillary refill takes less than 2 seconds. No rash noted. She is not diaphoretic. No erythema. No pallor.   Psychiatric: She has a normal mood and affect. Her behavior is normal. Judgment and thought content normal.       ECOG Performance Status: (foot note - ECOG PS provided by Eastern Cooperative Oncology Group) 1 - Symptomatic but completely ambulatory    Karnofsky Performance Score:  80%- Normal Activity with Effort: Some Symptoms of Disease    Labs:   Lab Results   Component Value Date    WBC 6.53 12/16/2019    HGB 11.5 (L) 12/16/2019    HCT 36.3 (L) 12/16/2019     (L) 12/16/2019    ALT 6 (L) 12/16/2019    AST 13 12/16/2019     12/16/2019    K 4.1 12/16/2019     12/16/2019    CREATININE 1.0 12/16/2019    BUN 15 12/16/2019    CO2 25 12/16/2019    TSH 1.913 10/17/2019    INR 1.1 10/16/2019       Imaging: previous imaging has been reviewed    Assessment and Plan:     Mrs. Mac is a pleasant 69 year old female with recently diagnosed AML.      Acute Monocytic Leukemia  -- Ad[facility] early AM with WBC around 100s, for suspected new non-M3 AML  --lo res HLA typing on 5/26/18 and hi res on 5/27/18 done in anticipation of possible need for future transplant   --Pt with two daughters, two full sisters (in their mid 60s, one with brain aneurysm hx, other one without any medical issues), and one full brother (61 y/o healthy).  --NPM1 +, CEBPA (-), FLT3 (+).  On Midostaurin 50 mg PO BID until Day 14 (held starting 6/8 to 6/11). Stopped when FLAG JENNIFER re-induction started. Restarted Midostaurin at 25 mg bid on day 8 of FLAG JENNIEFR induction (6/22), increase to full dose 50 mg bid (6/26)  as improvement in liver enzymes. Stopped 7/3/18 due to abnormal cardiac echo.  --day 14 bone marrow biopsy completed 6/11 showing persistent disease with 15% CD 34 positive blasts  --Day 60 of FLAG-JENNIFER, tolerating without difficulty except nausea/vomiting  --day 14 restaging bone marrow biopsy done 6/29 without complication, results with no evidence of AML, FLT 3 negative, will need repeat marrow at count recovery outpatient   --recovery marrow showed no evidence of disease  --HiDAC #1 on 8/14/18, HiDAC#2 on 9/11/18  --Cycle 3 delayed 1 week due to pancytopenia. Started on 10/16/18  --recovery bone marrow showed no signs of residual leukemia  --Bone marrow biopsy done in May shows no overt signs of recurrent disease    Left sided jaw dysfunction  --MRI revealed degenerative changes  --Seen by ENT    Thrombocytopenia  --Previously in the 70-90 range  --108 today    Suspected Leukemia Cutis  --pathology from dermatology confirmed inflammatory changes  --now resolved with topical steroid cream    Abnormal Liver Function Tests  --ALT and AST and Alk phos have again increased substantially since re-starting midostaurin.   --midostaurin started day 8 at 25 mg bid, monitoring liver enzymes closely and improved. Increasing Midostaurin to 50 mg bid 6/26. Stopped Midostaurin (7/3) due to new diagnosis of systolic heart failure EF 35%.  --6/22/18 liver US showing mild intrahepatic dilation, otherwise impression of hepatic steatosis.  Will hold off of MRCP at this time.    --permanently discontinued midostaurin     Chemotherapy induced cardiomyopathy  --cardiology follow-up as outpatient in 3 months  --repeat 2D echo on 6/15 with preserved EF of 60%.  However 7/2/18 echo with reduced EF 30-35%  --Echo on 8/15/18 was within normal range EF of 60-65%    Rheumatologic issues  --Previously on steroids and methotrexate  --working with [facility] rheumatology      30 minutes were spent face to face with the patient and her  family to  discuss the disease, natural history, treatment options and survival statistics. I have provided the patient with an opportunity to ask questions and have all questions answered to her satisfaction.       she will return to the clinic in 4 weeks, but knows to call in the interim if symptoms change or should a problem arise.        Laquita Troy MD  Hematology and Medical Oncology  Bone Marrow Transplant  [facility]

## 2020-01-14 ENCOUNTER — OFFICE VISIT (OUTPATIENT)
Dept: HEMATOLOGY/ONCOLOGY | Facility: CLINIC | Age: 70
End: 2020-01-14
Payer: MEDICARE

## 2020-01-14 ENCOUNTER — LAB VISIT (OUTPATIENT)
Dept: LAB | Facility: HOSPITAL | Age: 70
End: 2020-01-14
Attending: INTERNAL MEDICINE
Payer: MEDICARE

## 2020-01-14 VITALS
HEIGHT: 64 IN | HEART RATE: 79 BPM | SYSTOLIC BLOOD PRESSURE: 129 MMHG | BODY MASS INDEX: 29.99 KG/M2 | WEIGHT: 175.69 LBS | OXYGEN SATURATION: 99 % | DIASTOLIC BLOOD PRESSURE: 57 MMHG | TEMPERATURE: 99 F | RESPIRATION RATE: 16 BRPM

## 2020-01-14 DIAGNOSIS — G93.40 ACUTE ENCEPHALOPATHY: ICD-10-CM

## 2020-01-14 DIAGNOSIS — Z86.19 HISTORY OF CLOSTRIDIUM DIFFICILE INFECTION: ICD-10-CM

## 2020-01-14 DIAGNOSIS — M54.6 ACUTE BILATERAL THORACIC BACK PAIN: ICD-10-CM

## 2020-01-14 DIAGNOSIS — L50.9 URTICARIA: Primary | ICD-10-CM

## 2020-01-14 DIAGNOSIS — C93.01 ACUTE MONOCYTIC LEUKEMIA IN REMISSION: ICD-10-CM

## 2020-01-14 DIAGNOSIS — C92.00 AML (ACUTE MYELOBLASTIC LEUKEMIA): ICD-10-CM

## 2020-01-14 DIAGNOSIS — G89.29 OTHER CHRONIC PAIN: ICD-10-CM

## 2020-01-14 DIAGNOSIS — R79.89 ELEVATED LFTS: ICD-10-CM

## 2020-01-14 LAB
ALBUMIN SERPL BCP-MCNC: 3.7 G/DL (ref 3.5–5.2)
ALP SERPL-CCNC: 105 U/L (ref 55–135)
ALT SERPL W/O P-5'-P-CCNC: 24 U/L (ref 10–44)
ANION GAP SERPL CALC-SCNC: 11 MMOL/L (ref 8–16)
AST SERPL-CCNC: 15 U/L (ref 10–40)
BILIRUB SERPL-MCNC: 0.3 MG/DL (ref 0.1–1)
BUN SERPL-MCNC: 18 MG/DL (ref 8–23)
CALCIUM SERPL-MCNC: 9.2 MG/DL (ref 8.7–10.5)
CHLORIDE SERPL-SCNC: 106 MMOL/L (ref 95–110)
CO2 SERPL-SCNC: 25 MMOL/L (ref 23–29)
CREAT SERPL-MCNC: 1 MG/DL (ref 0.5–1.4)
ERYTHROCYTE [DISTWIDTH] IN BLOOD BY AUTOMATED COUNT: 13.9 % (ref 11.5–14.5)
EST. GFR  (AFRICAN AMERICAN): >60 ML/MIN/1.73 M^2
EST. GFR  (NON AFRICAN AMERICAN): 58 ML/MIN/1.73 M^2
GLUCOSE SERPL-MCNC: 93 MG/DL (ref 70–110)
HCT VFR BLD AUTO: 36.6 % (ref 37–48.5)
HGB BLD-MCNC: 11.4 G/DL (ref 12–16)
IMM GRANULOCYTES # BLD AUTO: 0.03 K/UL (ref 0–0.04)
MCH RBC QN AUTO: 32.7 PG (ref 27–31)
MCHC RBC AUTO-ENTMCNC: 31.1 G/DL (ref 32–36)
MCV RBC AUTO: 105 FL (ref 82–98)
NEUTROPHILS # BLD AUTO: 3 K/UL (ref 1.8–7.7)
PLATELET # BLD AUTO: 97 K/UL (ref 150–350)
PMV BLD AUTO: 9.9 FL (ref 9.2–12.9)
POTASSIUM SERPL-SCNC: 4.5 MMOL/L (ref 3.5–5.1)
PROT SERPL-MCNC: 6.7 G/DL (ref 6–8.4)
RBC # BLD AUTO: 3.49 M/UL (ref 4–5.4)
SODIUM SERPL-SCNC: 142 MMOL/L (ref 136–145)
WBC # BLD AUTO: 5.07 K/UL (ref 3.9–12.7)

## 2020-01-14 PROCEDURE — 99999 PR PBB SHADOW E&M-EST. PATIENT-LVL IV: ICD-10-PCS | Mod: PBBFAC,,, | Performed by: INTERNAL MEDICINE

## 2020-01-14 PROCEDURE — 1159F PR MEDICATION LIST DOCUMENTED IN MEDICAL RECORD: ICD-10-PCS | Mod: ,,, | Performed by: INTERNAL MEDICINE

## 2020-01-14 PROCEDURE — 99215 PR OFFICE/OUTPT VISIT, EST, LEVL V, 40-54 MIN: ICD-10-PCS | Mod: S$PBB,,, | Performed by: INTERNAL MEDICINE

## 2020-01-14 PROCEDURE — 1126F AMNT PAIN NOTED NONE PRSNT: CPT | Mod: ,,, | Performed by: INTERNAL MEDICINE

## 2020-01-14 PROCEDURE — 36415 COLL VENOUS BLD VENIPUNCTURE: CPT

## 2020-01-14 PROCEDURE — 85027 COMPLETE CBC AUTOMATED: CPT

## 2020-01-14 PROCEDURE — 99215 OFFICE O/P EST HI 40 MIN: CPT | Mod: S$PBB,,, | Performed by: INTERNAL MEDICINE

## 2020-01-14 PROCEDURE — 99214 OFFICE O/P EST MOD 30 MIN: CPT | Mod: PBBFAC | Performed by: INTERNAL MEDICINE

## 2020-01-14 PROCEDURE — 1126F PR PAIN SEVERITY QUANTIFIED, NO PAIN PRESENT: ICD-10-PCS | Mod: ,,, | Performed by: INTERNAL MEDICINE

## 2020-01-14 PROCEDURE — 80053 COMPREHEN METABOLIC PANEL: CPT

## 2020-01-14 PROCEDURE — 99999 PR PBB SHADOW E&M-EST. PATIENT-LVL IV: CPT | Mod: PBBFAC,,, | Performed by: INTERNAL MEDICINE

## 2020-01-14 PROCEDURE — 1159F MED LIST DOCD IN RCRD: CPT | Mod: ,,, | Performed by: INTERNAL MEDICINE

## 2020-01-14 RX ORDER — DIPHENHYDRAMINE HCL 50 MG
50 CAPSULE ORAL NIGHTLY PRN
Qty: 90 CAPSULE | Refills: 3 | Status: SHIPPED | OUTPATIENT
Start: 2020-01-14 | End: 2020-04-13

## 2020-01-14 RX ORDER — CLOTRIMAZOLE 10 MG/1
LOZENGE ORAL; TOPICAL
COMMUNITY
Start: 2019-10-07 | End: 2021-01-19

## 2020-01-14 NOTE — PROGRESS NOTES
Hematology and Medical Oncology   Follow Up Note     01/14/2020    Primary Oncologic Diagnosis: AML, FLT 3 + and NPMN1+.   History of Present Ilness:   Sabrina Badillo) is a pleasant 69 y.o.female presents to clinic following 2 induction therapies for AML. She was in the hospital roughly 50 days to receive 7+3 and then FLAG JENNIFER.    Oncology History:   68 y.o. female admitted overnight for possible new AML. Came in from OSH with elevated WBC of 100.   05/25/2018: Pt is now on hydrea 2 grams BID, allopurinol 300 mg daily, and IVF. Pt may need rasburicase this weekend. She will undergo a BM bx and aspiration today. She is an induction candidate and will not be screened for research trials. She has anxiety for which she usually takes xanax, this was re-started.   05/26/2018 PICC placed. Reports she slept well last night. Anxiety improved with prn xanax. EF 65%, HIV and Hep panel negative. 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  6/12/2018: Day 15 of 7+3 induction, restaging BM bx done yesterday. On Midostaurin. Fever yesterday and BC with gram + cocci in clusters. On cefepime day 2 and will start Vanc today. Labial mucositis improving.   6/13/2018: Day 16 7+3. BC with staph aureus, identification pending. Surveillance blood cultures done today. Afebrile x 48 hours. On cefepime and Vanc. Labial mucositis resolved. No complaints of pain. Nausea controlled. No diarrhea. Primary complaint is fatigue.   6/14/20018: Day 17 of 7+3. Day 14 bone marrow results pending. BC with staph epidermis only sensitive to Vancomycin. Vanc day 3 and cefepime day 4. Vanc trough 12.2 last night. BP remains low but stable. Afebrile x 72 hours.   6/15/2018: Day 18 of 7+3. Day 14 bone marrow biopsy shows persistent disease with 15% blasts. Discussion with patient regarding treatment and she is deciding if she wants to proceed with re-induction vs outpatient maintenance. Temp of 100.4 overnight, unsustained. Day 5 Cefepime and Day 4 of  Vanc for staph epidermis. Repeat cultures NGTD. BP improved. Electrolytes (mag, phos, K and calcium) replaced aggressively this am and will repeat labs this afternoon. No complaints this am.   06/16/2018: Day 19 of 7+3. Day 2 of Flag-JENNIFER. Remains afebrile. Calcium remains low and have increased PO supplementation. Remains on vanc and aztreonam. Somewhat loopy feeling after receiving benadryl.   06/17/2018: Day 20 of 7+3. Day 3 of FLAG-JENNIFER. Multiple electrolyte abnormalities. New bilateral leg and feet swelling.   6/18/2018: Day 21 of 7+3 and Day 4 of FLAG JENNIFER. Tolerating well with some nausea controlled with Zofran. VSS, afebrile. ANC 1900 on Neupogen. Continues Vanc for staph epi bacteremia. Multiple electrolytes replaced today. Complains of edema to lower extremities, not improved after 20 of lasix yesterday. No sob or CP.   6/19/18: Day 22 of 7+3 and Day 5 of FLAG JENNIFER. VSS, afebrile, ANC 3900. Vanc trough elevated and dose adjusted this am. Lower extremity edema improved after 40 of lasix yesterday, net negative 300 cc I&O. Mild nausea, no abdominal pain or diarrhea. Poor appetite but no difficulty eating or taking pills.   6/20/18: Day 23 of 7+3 and Day 6 of FLAG JENNIFER. Patient complains of nausea and vomiting overnight and this am, especially when taking pills. Will add Zyprexa daily in addition to Zofran, compazine, and ativan PRN and will change meds to IV. 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 Afebrile.  No diarrhea, mouth sores or pain.  06/21/2018: Day 24 of 7+3 and Day 7 of FLAG JENNIFER. Nausea better controlled. Continued on Vanc.  6/22/2018: Day 25 of 7+3 and Day 8 of FLAG JENNIFER. Nausea improved some with Zyprexa but did have 1 episode of emesis overnight.continuing meds IV due to vomiting. Remains afebrile. ANC 0. Continues Vanc and Flagyl. Electrolytes replaced.   06/23/2018 : day 26 of 7+3 and Day 9 of FLAG JENNIFER.  Nausea persists,  worsened after taking pills so meds converted to IV.  Encouraged ambulation.  Continue with flagyl for leptotrichia goodfellowii.  RUQ US showed intrahepatic dilation, overall impression hepatic steatosis.  CBD 0.5cm.  No Gallbladder.    06/24/2018 : day 27 of 7+3 and Day 10 of FLAG JENNIFER.  Nausea persisting, able to tolerate some po pills.  Last day of flagyl (day 5).  Still pancytopenic. Appetite still low as po intake triggers nausea.  06/25/2018: day 28 of 7+3 and Day 11 of FLAG JENNIFER. Afebrile, ANC 0. Has completed Flagyl. Will decrease Vanc to 1000 mg q12, trough 19.9, will complete Vanc on 6/27. Liver enzymes stable on Midostaurin. VSS.   6/26/2018: day 29 of 7+3 and day 12 of FLAG JENNIFER. Liver enzymes improved and Midostaurin increased to full dose 50 mg bid. Nausea controlled, afebrile, VSS, NEON.  6/27/2018: day 30 of 7+3 and Day 13 of FLAG JENNIFER. Persistent nausea and emesis x 1 yesterday. Compazine changed to scheduled. Vanc ends today. Afebrile, VSS. Aggressive electrolyte replacement, low electrolytes possibly due to Midostaurin.   6/28/2018: day 31 of 7+3 and Day 14 of FLAG JENNIFER. Nausea improved with scheduled Compazine. Patient has agreed to start converting some IV meds to po. VSS, afebrile, electrolytes wnl today. Only complains of fatigue, new rash noted to upper back and chest and legs, papular rash mildly red.  6/29/2018: Day 32 of 7+3 and Day 15 of FLAG JENNIFER. Day 14 restaging bone marrow biopsy done at bedside today. Patient states nausea improved but she did have emesis last night after taking 9 pm pills. Rash improved. No mouth sores, diarrhea or pain.   7/2/2018: Day 35 of 7+3 and Day 18 of FLAG JENNIFER. Restaging BM bx results pending. Fluid overload over the weekend. Chest x-ray with pulmonary edema. Os sats down to 88%. Placed on O2 at 2 L NC, off O2 this am. Received lasix. Net negative 2.7 L today. 1 episode of nausea, no emesis. Reports anxiety relieved with PRN meds. Electrolytes improved,  afebrile, VSS.   7/3/2018: Day 36 of 7+3 and Day 19 of FLAG JENNIFER. Restaging Bm bx with GABRIEL. Cardiology consulted for abnormal echo, EF 30% with pulmonary HTN and severe L atrial enlargement. EKG with T wave abnormality, possible anterolateral ischemia and prolonged QTc of 530. Medications adjusted. Nausea controlled, no diarrhea. Electrolytes improved. On O2 as needed.   07/04/2018 : Day 37 of 7+3 and Day 20 of FLAG JENNIFER Diarrhea in AM, watery, no associated abdominal pain, nausea, or vomiting.    07/05/2018: Day 38 of 7+3 ane Day 21 of FLAG JENNIFER. No CP or SOB, cardiology following. On RA. All meds changed to po, denies nausea or vomiting and no diarrhea. VSS, afebrile.   7/6/2018: Day 22 of FLAG JENNIFER. Continues to tolerate po meds with no nausea. Afebrile. Awaiting count recovery for discharge.  7/7/2018-/8/2018: Day 40/41 of 7+3, Day 23/24 of FLAG JENNIFER.  Improving clinically.  Started on mag oxide per patient request.  Labs showing signs of ANC recovery.  7/9/2018: Day 25 FLAG JENNIFER, , continues Neupogen. Received platelets today. Afebrile, VSS. Tolerating all oral meds with no nausea or vomiting. Only complains of fatigue.   7/10/2018: Day 26 FLAG JENNIFER,  today. Had 2 episodes of vomiting and diarrhea last night. Feeling better. Afebrile, VSS.   7/11/2018: Day 27 FLAG JENNIFER,  today. No vomiting or diarrhea overnight and no nausea. Afebrile, VSS. Complains of back pain (bone pain) suspect due to count recovery. Relieved with oxy IR and Zyrtec started.  07/12/2018: Day 28 flag jennifer, engrafted today with ANC of 730. Back pain improved with zyrtec. Scheduled for IT chemo tomorrow at 1:30 pm and discharge home thereafter. Will likely need plt transfusion prior to IT chemo tomorrow   7/13/2018: Day 29 of FLAG JENNIFER. ANC up to 1300 today. Transfusing platelets today prior to LP for IT chemo. Patient continues to complain of bone pain, mostly to back and legs. No nausea, diarrhea, sob. Afebrile and VSS.    --hospitalized 7/23-7/25 for C.diff diarrhea, neutropenic fever.  While inpatient she developed pink blisters on her palm that were concerning for relapse.  --palm biopsy on 7/26/18 for suspicion of leukemia cutis was also negative for sign of relapse  --bone marrow biopsy 7/31/18 was negative for signs of relapse   --Admitted on 8/14/18 for HiDAC#1  --Admitted on 9/11/18 for HiDAC#2  --Admitted on 10/16/18 for HiDAC#3  --Post consolidation bone marrow biopsy on 12/27/2018: AML FISH (BM) Interpretation: The result is within normal limits for the AML FISH panel. No evidence of residual disease.  --Bone Marrow Biopsy on 5/9/19 NO MORPHOLOGIC EVIDENCE OF RESIDUAL ACUTE MYELOID LEUKEMIA. RECOMMEND NGS STUDY TO RULE OUT RESIDUAL DISEASE. deletion 20q    Interval History:   Mrs. Wilkes continues to do quite well. Pain and sleep are now minimal issues for her. She is staying more active and not requiring naps like she previously did.    eJ[employer] has retired at the end of 2019 and they are planning a trip between [place] and [place].      Past Medical History:   Past Medical History:   Diagnosis Date    Cancer 03/2018    AML    CHF (congestive heart failure)     Diarrhea 9/14/2018    Encounter for blood transfusion        Current Medications:   Current Outpatient Medications   Medication Sig    ALPRAZolam (XANAX) 0.5 MG tablet Take 0.5 tablets (0.25 mg total) by mouth 3 (three) times daily as needed for Insomnia or Anxiety.    clotrimazole (MYCELEX) 10 mg sultana     diphenhydrAMINE (BENADRYL) 50 MG capsule Take 50 mg by mouth nightly as needed for Itching or Insomnia.    escitalopram oxalate (LEXAPRO) 20 MG tablet Take 20 mg by mouth.    famotidine (PEPCID) 40 MG tablet Take 1 tablet (40 mg total) by mouth nightly.    ibuprofen (ADVIL,MOTRIN) 600 MG tablet Take 1 tablet (600 mg total) by mouth every 6 (six) hours as needed (left ear/jaw pain).    LORazepam (ATIVAN) 0.5 MG tablet Take 1 tablet (0.5 mg total) by mouth  every 6 (six) hours as needed (nausea).    metoprolol succinate (TOPROL XL) 25 MG 24 hr tablet Take 1 tablet (25 mg total) by mouth once daily.    ofloxacin (FLOXIN) 0.3 % otic solution Place 5 drops into the left ear once daily.    omeprazole (PRILOSEC OTC) 20 MG tablet Take 20 mg by mouth every morning.    omeprazole (PRILOSEC) 40 MG capsule Take 1 capsule (40 mg total) by mouth before breakfast. Take on empty stomach 30-60 minutes before meal    ondansetron (ZOFRAN) 8 MG tablet Take 1 tablet (8 mg total) by mouth every 12 (twelve) hours as needed for Nausea.    ondansetron (ZOFRAN) 8 MG tablet     oxyCODONE-acetaminophen (PERCOCET) 7.5-325 mg per tablet Take 1 tablet by mouth every 4 (four) hours as needed for Pain.    predniSONE (DELTASONE) 10 MG tablet TAKE ONE TABLET BY MOUTH ONCE DAILY AS NEEDED FOR ARTHRITIS FLARE    predniSONE (DELTASONE) 10 MG tablet 5 mg.    predniSONE (DELTASONE) 2.5 MG tablet Take prednisone 3 pills daily for 2 weeks and then 2 tablets daily for 2 weeks and the one tablet daily    traMADol (ULTRAM) 50 mg tablet Take 1 tablet (50 mg total) by mouth every 6 (six) hours as needed for Pain.    walker (ULTRA-LIGHT ROLLATOR) Misc 1 Units by Misc.(Non-Drug; Combo Route) route once daily.     No current facility-administered medications for this visit.      ALLERGIES:   Review of patient's allergies indicates:   Allergen Reactions    Methotrexate analogues      Elevated Liver Enzyme    Bactrim [sulfamethoxazole-trimethoprim] Nausea And Vomiting and Rash       Review of Systems:     Review of Systems   Constitutional: Positive for fatigue. Negative for appetite change, chills, diaphoresis, fever and unexpected weight change.   HENT:   Negative for hearing loss, mouth sores, nosebleeds, sore throat, trouble swallowing and voice change.    Eyes: Negative for eye problems and icterus.   Respiratory: Negative for chest tightness, cough, hemoptysis, shortness of breath and wheezing.     Cardiovascular: Negative for chest pain, leg swelling and palpitations.   Gastrointestinal: Negative for abdominal distention, abdominal pain, blood in stool, diarrhea, nausea and vomiting.   Endocrine: Negative for hot flashes.   Genitourinary: Negative for bladder incontinence, difficulty urinating, dysuria and hematuria.    Musculoskeletal: Positive for myalgias. Negative for arthralgias, back pain, flank pain, gait problem, neck pain and neck stiffness.   Skin: Negative for itching, rash and wound.   Neurological: Negative for dizziness, extremity weakness, gait problem, headaches, numbness, seizures and speech difficulty.   Hematological: Negative for adenopathy. Does not bruise/bleed easily.   Psychiatric/Behavioral: Positive for sleep disturbance. Negative for confusion and depression. The patient is not nervous/anxious.         Physical Exam:     Vitals:    01/14/20 1428   BP: (!) 129/57   Pulse: 79   Resp: 16   Temp: 98.7 °F (37.1 °C)       Physical Exam   Constitutional: She is oriented to person, place, and time. She appears well-developed and well-nourished. No distress.   HENT:   Head: Normocephalic and atraumatic.   Mouth/Throat: Oropharynx is clear and moist. No oropharyngeal exudate.   Hair has regrown     Eyes: Pupils are equal, round, and reactive to light. Conjunctivae and EOM are normal.   Neck: Normal range of motion. Neck supple. No JVD present. No tracheal deviation present. No thyromegaly present.   Cardiovascular: Normal rate, regular rhythm and normal heart sounds. Exam reveals no friction rub.   No murmur heard.  Pulmonary/Chest: Effort normal and breath sounds normal. No stridor. No respiratory distress. She has no wheezes. She has no rales. She exhibits no tenderness.   Abdominal: Soft. Bowel sounds are normal. She exhibits no distension. There is no tenderness. There is no rebound and no guarding.   Musculoskeletal: Normal range of motion. She exhibits no edema, tenderness or  deformity.   Lymphadenopathy:     She has no axillary adenopathy.   Neurological: She is alert and oriented to person, place, and time. She displays normal reflexes. No cranial nerve deficit or sensory deficit. She exhibits normal muscle tone. Coordination normal.   Skin: Skin is warm and dry. Capillary refill takes less than 2 seconds. No rash noted. She is not diaphoretic. No erythema. No pallor.   Psychiatric: She has a normal mood and affect. Her behavior is normal. Judgment and thought content normal.       ECOG Performance Status: (foot note - ECOG PS provided by Eastern Cooperative Oncology Group) 1 - Symptomatic but completely ambulatory    Karnofsky Performance Score:  80%- Normal Activity with Effort: Some Symptoms of Disease    Labs:   Lab Results   Component Value Date    WBC 5.07 01/14/2020    HGB 11.4 (L) 01/14/2020    HCT 36.6 (L) 01/14/2020    PLT 97 (L) 01/14/2020    ALT 24 01/14/2020    AST 15 01/14/2020     01/14/2020    K 4.5 01/14/2020     01/14/2020    CREATININE 1.0 01/14/2020    BUN 18 01/14/2020    CO2 25 01/14/2020    TSH 1.913 10/17/2019    INR 1.1 10/16/2019       Imaging: previous imaging has been reviewed    Assessment and Plan:     Mrs. Mac is a pleasant 69 year old female with recently diagnosed AML.      Acute Monocytic Leukemia  -- Admitted from [facility]18 early AM with WBC around 100s, for suspected new non-M3 AML  --lo res HLA typing on 5/26/18 and hi res on 5/27/18 done in anticipation of possible need for future transplant   --Pt with two daughters, two full sisters (in their mid 60s, one with brain aneurysm hx, other one without any medical issues), and one full brother (61 y/o healthy).  --NPM1 +, CEBPA (-), FLT3 (+).  On Midostaurin 50 mg PO BID until Day 14 (held starting 6/8 to 6/11). Stopped when FLAG JENNIFER re-induction started. Restarted Midostaurin at 25 mg bid on day 8 of FLAG JENNIFER induction (6/22), increase to full dose 50 mg bid (6/26) as  improvement in liver enzymes. Stopped 7/3/18 due to abnormal cardiac echo.  --day 14 bone marrow biopsy completed 6/11 showing persistent disease with 15% CD 34 positive blasts  --Day 60 of FLAG-JENNIFER, tolerating without difficulty except nausea/vomiting  --day 14 restaging bone marrow biopsy done 6/29 without complication, results with no evidence of AML, FLT 3 negative, will need repeat marrow at count recovery outpatient   --recovery marrow showed no evidence of disease  --HiDAC #1 on 8/14/18, HiDAC#2 on 9/11/18  --Cycle 3 delayed 1 week due to pancytopenia. Started on 10/16/18  --recovery bone marrow showed no signs of residual leukemia  --Bone marrow biopsy done in May shows no overt signs of recurrent disease    Left sided jaw dysfunction  --MRI revealed degenerative changes  --Seen by ENT    Thrombocytopenia  --Previously in the 70-90 range  --108 today    Suspected Leukemia Cutis  --pathology from dermatology confirmed inflammatory changes  --now resolved with topical steroid cream    Abnormal Liver Function Tests  --ALT and AST and Alk phos have again increased substantially since re-starting midostaurin.   --midostaurin started day 8 at 25 mg bid, monitoring liver enzymes closely and improved. Increasing Midostaurin to 50 mg bid 6/26. Stopped Midostaurin (7/3) due to new diagnosis of systolic heart failure EF 35%.  --6/22/18 liver US showing mild intrahepatic dilation, otherwise impression of hepatic steatosis.  Will hold off of MRCP at this time.    --permanently discontinued midostaurin     Chemotherapy induced cardiomyopathy  --cardiology follow-up as outpatient in 3 months  --repeat 2D echo on 6/15 with preserved EF of 60%.  However 7/2/18 echo with reduced EF 30-35%  --Echo on 8/15/18 was within normal range EF of 60-65%    Rheumatologic issues  --Previously on steroids and methotrexate  --working with [facility] [place]      30 minutes were spent face to face with the patient and her  family to discuss  the disease, natural history, treatment options and survival statistics. I have provided the patient with an opportunity to ask questions and have all questions answered to her satisfaction.       she will return to the clinic in 4 weeks, but knows to call in the interim if symptoms change or should a problem arise.        Laquita Troy MD  Hematology and Medical Oncology  Bone Marrow Transplant  [facility]

## 2020-02-17 ENCOUNTER — OFFICE VISIT (OUTPATIENT)
Dept: HEMATOLOGY/ONCOLOGY | Facility: CLINIC | Age: 70
End: 2020-02-17
Payer: MEDICARE

## 2020-02-17 ENCOUNTER — LAB VISIT (OUTPATIENT)
Dept: LAB | Facility: HOSPITAL | Age: 70
End: 2020-02-17
Payer: MEDICARE

## 2020-02-17 VITALS
DIASTOLIC BLOOD PRESSURE: 61 MMHG | BODY MASS INDEX: 30.93 KG/M2 | RESPIRATION RATE: 16 BRPM | HEART RATE: 86 BPM | HEIGHT: 64 IN | OXYGEN SATURATION: 98 % | TEMPERATURE: 99 F | SYSTOLIC BLOOD PRESSURE: 133 MMHG | WEIGHT: 181.19 LBS

## 2020-02-17 DIAGNOSIS — H92.02 LEFT EAR PAIN: ICD-10-CM

## 2020-02-17 DIAGNOSIS — C92.00 AML (ACUTE MYELOBLASTIC LEUKEMIA): ICD-10-CM

## 2020-02-17 DIAGNOSIS — C93.01 ACUTE MONOCYTIC LEUKEMIA IN REMISSION: Primary | ICD-10-CM

## 2020-02-17 DIAGNOSIS — Z86.19 HISTORY OF CLOSTRIDIUM DIFFICILE INFECTION: ICD-10-CM

## 2020-02-17 LAB
ALBUMIN SERPL BCP-MCNC: 3.7 G/DL (ref 3.5–5.2)
ALP SERPL-CCNC: 97 U/L (ref 55–135)
ALT SERPL W/O P-5'-P-CCNC: 13 U/L (ref 10–44)
ANION GAP SERPL CALC-SCNC: 9 MMOL/L (ref 8–16)
AST SERPL-CCNC: 14 U/L (ref 10–40)
BILIRUB SERPL-MCNC: 0.4 MG/DL (ref 0.1–1)
BUN SERPL-MCNC: 14 MG/DL (ref 8–23)
CALCIUM SERPL-MCNC: 9.4 MG/DL (ref 8.7–10.5)
CHLORIDE SERPL-SCNC: 105 MMOL/L (ref 95–110)
CO2 SERPL-SCNC: 26 MMOL/L (ref 23–29)
CREAT SERPL-MCNC: 1 MG/DL (ref 0.5–1.4)
ERYTHROCYTE [DISTWIDTH] IN BLOOD BY AUTOMATED COUNT: 13.1 % (ref 11.5–14.5)
EST. GFR  (AFRICAN AMERICAN): >60 ML/MIN/1.73 M^2
EST. GFR  (NON AFRICAN AMERICAN): 58 ML/MIN/1.73 M^2
GLUCOSE SERPL-MCNC: 85 MG/DL (ref 70–110)
HCT VFR BLD AUTO: 38.6 % (ref 37–48.5)
HGB BLD-MCNC: 11.8 G/DL (ref 12–16)
IMM GRANULOCYTES # BLD AUTO: 0.02 K/UL (ref 0–0.04)
MCH RBC QN AUTO: 32.1 PG (ref 27–31)
MCHC RBC AUTO-ENTMCNC: 30.6 G/DL (ref 32–36)
MCV RBC AUTO: 105 FL (ref 82–98)
NEUTROPHILS # BLD AUTO: 1.8 K/UL (ref 1.8–7.7)
PLATELET # BLD AUTO: 100 K/UL (ref 150–350)
PMV BLD AUTO: 9.9 FL (ref 9.2–12.9)
POTASSIUM SERPL-SCNC: 4.5 MMOL/L (ref 3.5–5.1)
PROT SERPL-MCNC: 6.9 G/DL (ref 6–8.4)
RBC # BLD AUTO: 3.68 M/UL (ref 4–5.4)
SODIUM SERPL-SCNC: 140 MMOL/L (ref 136–145)
WBC # BLD AUTO: 3.47 K/UL (ref 3.9–12.7)

## 2020-02-17 PROCEDURE — 99215 PR OFFICE/OUTPT VISIT, EST, LEVL V, 40-54 MIN: ICD-10-PCS | Mod: S$PBB,,, | Performed by: INTERNAL MEDICINE

## 2020-02-17 PROCEDURE — 99999 PR PBB SHADOW E&M-EST. PATIENT-LVL IV: CPT | Mod: PBBFAC,,, | Performed by: INTERNAL MEDICINE

## 2020-02-17 PROCEDURE — 99214 OFFICE O/P EST MOD 30 MIN: CPT | Mod: PBBFAC | Performed by: INTERNAL MEDICINE

## 2020-02-17 PROCEDURE — 85027 COMPLETE CBC AUTOMATED: CPT

## 2020-02-17 PROCEDURE — 99999 PR PBB SHADOW E&M-EST. PATIENT-LVL IV: ICD-10-PCS | Mod: PBBFAC,,, | Performed by: INTERNAL MEDICINE

## 2020-02-17 PROCEDURE — 80053 COMPREHEN METABOLIC PANEL: CPT

## 2020-02-17 PROCEDURE — 99215 OFFICE O/P EST HI 40 MIN: CPT | Mod: S$PBB,,, | Performed by: INTERNAL MEDICINE

## 2020-02-17 PROCEDURE — 36415 COLL VENOUS BLD VENIPUNCTURE: CPT

## 2020-02-17 NOTE — PROGRESS NOTES
Hematology and Medical Oncology   Follow Up Note     02/17/2020    Primary Oncologic Diagnosis: AML, FLT 3 + and NPMN1+.   History of Present Ilness:   Sabrina Badillo) is a pleasant 69 y.o.female presents to clinic following 2 induction therapies for AML. She was in the hospital roughly 50 days to receive 7+3 and then FLAG JENNIFER.    Oncology History:   68 y.o. female admitted overnight for possible new AML. Came in from OSH with elevated WBC of 100.   05/25/2018: Pt is now on hydrea 2 grams BID, allopurinol 300 mg daily, and IVF. Pt may need rasburicase this weekend. She will undergo a BM bx and aspiration today. She is an induction candidate and will not be screened for research trials. She has anxiety for which she usually takes xanax, this was re-started.   05/26/2018 PICC placed. Reports she slept well last night. Anxiety improved with prn xanax. EF 65%, HIV and Hep panel negative. 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  6/12/2018: Day 15 of 7+3 induction, restaging BM bx done yesterday. On Midostaurin. Fever yesterday and BC with gram + cocci in clusters. On cefepime day 2 and will start Vanc today. Labial mucositis improving.   6/13/2018: Day 16 7+3. BC with staph aureus, identification pending. Surveillance blood cultures done today. Afebrile x 48 hours. On cefepime and Vanc. Labial mucositis resolved. No complaints of pain. Nausea controlled. No diarrhea. Primary complaint is fatigue.   6/14/20018: Day 17 of 7+3. Day 14 bone marrow results pending. BC with staph epidermis only sensitive to Vancomycin. Vanc day 3 and cefepime day 4. Vanc trough 12.2 last night. BP remains low but stable. Afebrile x 72 hours.   6/15/2018: Day 18 of 7+3. Day 14 bone marrow biopsy shows persistent disease with 15% blasts. Discussion with patient regarding treatment and she is deciding if she wants to proceed with re-induction vs outpatient maintenance. Temp of 100.4 overnight, unsustained. Day 5 Cefepime and Day 4 of  Vanc for staph epidermis. Repeat cultures NGTD. BP improved. Electrolytes (mag, phos, K and calcium) replaced aggressively this am and will repeat labs this afternoon. No complaints this am.   06/16/2018: Day 19 of 7+3. Day 2 of Flag-JENNIFER. Remains afebrile. Calcium remains low and have increased PO supplementation. Remains on vanc and aztreonam. Somewhat loopy feeling after receiving benadryl.   06/17/2018: Day 20 of 7+3. Day 3 of FLAG-JENNIFER. Multiple electrolyte abnormalities. New bilateral leg and feet swelling.   6/18/2018: Day 21 of 7+3 and Day 4 of FLAG JENNIFER. Tolerating well with some nausea controlled with Zofran. VSS, afebrile. ANC 1900 on Neupogen. Continues Vanc for staph epi bacteremia. Multiple electrolytes replaced today. Complains of edema to lower extremities, not improved after 20 of lasix yesterday. No sob or CP.   6/19/18: Day 22 of 7+3 and Day 5 of FLAG JENNIFER. VSS, afebrile, ANC 3900. Vanc trough elevated and dose adjusted this am. Lower extremity edema improved after 40 of lasix yesterday, net negative 300 cc I&O. Mild nausea, no abdominal pain or diarrhea. Poor appetite but no difficulty eating or taking pills.   6/20/18: Day 23 of 7+3 and Day 6 of FLAG JENNIFER. Patient complains of nausea and vomiting overnight and this am, especially when taking pills. Will add Zyprexa daily in addition to Zofran, compazine, and ativan PRN and will change meds to IV. 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 Afebrile.  No diarrhea, mouth sores or pain.  06/21/2018: Day 24 of 7+3 and Day 7 of FLAG JENNIFER. Nausea better controlled. Continued on Vanc.  6/22/2018: Day 25 of 7+3 and Day 8 of FLAG JENNIFER. Nausea improved some with Zyprexa but did have 1 episode of emesis overnight.continuing meds IV due to vomiting. Remains afebrile. ANC 0. Continues Vanc and Flagyl. Electrolytes replaced.   06/23/2018 : day 26 of 7+3 and Day 9 of FLAG JENNIFER.  Nausea persists,  worsened after taking pills so meds converted to IV.  Encouraged ambulation.  Continue with flagyl for leptotrichia goodfellowii.  RUQ US showed intrahepatic dilation, overall impression hepatic steatosis.  CBD 0.5cm.  No Gallbladder.    06/24/2018 : day 27 of 7+3 and Day 10 of FLAG JENNIFER.  Nausea persisting, able to tolerate some po pills.  Last day of flagyl (day 5).  Still pancytopenic. Appetite still low as po intake triggers nausea.  06/25/2018: day 28 of 7+3 and Day 11 of FLAG JENNIFER. Afebrile, ANC 0. Has completed Flagyl. Will decrease Vanc to 1000 mg q12, trough 19.9, will complete Vanc on 6/27. Liver enzymes stable on Midostaurin. VSS.   6/26/2018: day 29 of 7+3 and day 12 of FLAG JENNIFER. Liver enzymes improved and Midostaurin increased to full dose 50 mg bid. Nausea controlled, afebrile, VSS, NEON.  6/27/2018: day 30 of 7+3 and Day 13 of FLAG JENNIFER. Persistent nausea and emesis x 1 yesterday. Compazine changed to scheduled. Vanc ends today. Afebrile, VSS. Aggressive electrolyte replacement, low electrolytes possibly due to Midostaurin.   6/28/2018: day 31 of 7+3 and Day 14 of FLAG JENNIFER. Nausea improved with scheduled Compazine. Patient has agreed to start converting some IV meds to po. VSS, afebrile, electrolytes wnl today. Only complains of fatigue, new rash noted to upper back and chest and legs, papular rash mildly red.  6/29/2018: Day 32 of 7+3 and Day 15 of FLAG JENNIFER. Day 14 restaging bone marrow biopsy done at bedside today. Patient states nausea improved but she did have emesis last night after taking 9 pm pills. Rash improved. No mouth sores, diarrhea or pain.   7/2/2018: Day 35 of 7+3 and Day 18 of FLAG JENNIFER. Restaging BM bx results pending. Fluid overload over the weekend. Chest x-ray with pulmonary edema. Os sats down to 88%. Placed on O2 at 2 L NC, off O2 this am. Received lasix. Net negative 2.7 L today. 1 episode of nausea, no emesis. Reports anxiety relieved with PRN meds. Electrolytes improved,  afebrile, VSS.   7/3/2018: Day 36 of 7+3 and Day 19 of FLAG JENNIFER. Restaging Bm bx with GABRIEL. Cardiology consulted for abnormal echo, EF 30% with pulmonary HTN and severe L atrial enlargement. EKG with T wave abnormality, possible anterolateral ischemia and prolonged QTc of 530. Medications adjusted. Nausea controlled, no diarrhea. Electrolytes improved. On O2 as needed.   07/04/2018 : Day 37 of 7+3 and Day 20 of FLAG JENNIFER Diarrhea in AM, watery, no associated abdominal pain, nausea, or vomiting.    07/05/2018: Day 38 of 7+3 ane Day 21 of FLAG JENNIFER. No CP or SOB, cardiology following. On RA. All meds changed to po, denies nausea or vomiting and no diarrhea. VSS, afebrile.   7/6/2018: Day 22 of FLAG JENNIFER. Continues to tolerate po meds with no nausea. Afebrile. Awaiting count recovery for discharge.  7/7/2018-/8/2018: Day 40/41 of 7+3, Day 23/24 of FLAG JENNIFER.  Improving clinically.  Started on mag oxide per patient request.  Labs showing signs of ANC recovery.  7/9/2018: Day 25 FLAG JENNIFER, , continues Neupogen. Received platelets today. Afebrile, VSS. Tolerating all oral meds with no nausea or vomiting. Only complains of fatigue.   7/10/2018: Day 26 FLAG JENNIFER,  today. Had 2 episodes of vomiting and diarrhea last night. Feeling better. Afebrile, VSS.   7/11/2018: Day 27 FLAG JENNIFER,  today. No vomiting or diarrhea overnight and no nausea. Afebrile, VSS. Complains of back pain (bone pain) suspect due to count recovery. Relieved with oxy IR and Zyrtec started.  07/12/2018: Day 28 flag jennifer, engrafted today with ANC of 730. Back pain improved with zyrtec. Scheduled for IT chemo tomorrow at 1:30 pm and discharge home thereafter. Will likely need plt transfusion prior to IT chemo tomorrow   7/13/2018: Day 29 of FLAG JENNIFER. ANC up to 1300 today. Transfusing platelets today prior to LP for IT chemo. Patient continues to complain of bone pain, mostly to back and legs. No nausea, diarrhea, sob. Afebrile and VSS.    --hospitalized 7/23-7/25 for C.diff diarrhea, neutropenic fever.  While inpatient she developed pink blisters on her palm that were concerning for relapse.  --palm biopsy on 7/26/18 for suspicion of leukemia cutis was also negative for sign of relapse  --bone marrow biopsy 7/31/18 was negative for signs of relapse   --Admitted on 8/14/18 for HiDAC#1  --Admitted on 9/11/18 for HiDAC#2  --Admitted on 10/16/18 for HiDAC#3  --Post consolidation bone marrow biopsy on 12/27/2018: AML FISH (BM) Interpretation: The result is within normal limits for the AML FISH panel. No evidence of residual disease.  --Bone Marrow Biopsy on 5/9/19 NO MORPHOLOGIC EVIDENCE OF RESIDUAL ACUTE MYELOID LEUKEMIA. RECOMMEND NGS STUDY TO RULE OUT RESIDUAL DISEASE. deletion 20q    Interval History:   Mrs. Wilkes continues to do quite well. Stable myalgias in lower extremities. She continues to be more active and not requiring naps like she previously did. Denies fevers/chills, night sweats, lymphadenopathy, bleeding/bruising.    Past Medical History:   Past Medical History:   Diagnosis Date    Cancer 03/2018    AML    CHF (congestive heart failure)     Diarrhea 9/14/2018    Encounter for blood transfusion        Current Medications:   Current Outpatient Medications   Medication Sig    ALPRAZolam (XANAX) 0.5 MG tablet Take 0.5 tablets (0.25 mg total) by mouth 3 (three) times daily as needed for Insomnia or Anxiety.    clotrimazole (MYCELEX) 10 mg sultana     diphenhydrAMINE (BENADRYL) 50 MG capsule Take 50 mg by mouth nightly as needed for Itching or Insomnia.    diphenhydrAMINE (BENADRYL) 50 MG capsule Take 1 capsule (50 mg total) by mouth nightly as needed for Itching.    escitalopram oxalate (LEXAPRO) 20 MG tablet Take 20 mg by mouth.    famotidine (PEPCID) 40 MG tablet Take 1 tablet (40 mg total) by mouth nightly.    ibuprofen (ADVIL,MOTRIN) 600 MG tablet Take 1 tablet (600 mg total) by mouth every 6 (six) hours as needed (left  ear/jaw pain).    LORazepam (ATIVAN) 0.5 MG tablet Take 1 tablet (0.5 mg total) by mouth every 6 (six) hours as needed (nausea).    metoprolol succinate (TOPROL XL) 25 MG 24 hr tablet Take 1 tablet (25 mg total) by mouth once daily.    ofloxacin (FLOXIN) 0.3 % otic solution Place 5 drops into the left ear once daily.    omeprazole (PRILOSEC OTC) 20 MG tablet Take 20 mg by mouth every morning.    omeprazole (PRILOSEC) 40 MG capsule Take 1 capsule (40 mg total) by mouth before breakfast. Take on empty stomach 30-60 minutes before meal    ondansetron (ZOFRAN) 8 MG tablet Take 1 tablet (8 mg total) by mouth every 12 (twelve) hours as needed for Nausea.    ondansetron (ZOFRAN) 8 MG tablet     oxyCODONE-acetaminophen (PERCOCET) 7.5-325 mg per tablet Take 1 tablet by mouth every 4 (four) hours as needed for Pain.    predniSONE (DELTASONE) 10 MG tablet TAKE ONE TABLET BY MOUTH ONCE DAILY AS NEEDED FOR ARTHRITIS FLARE    predniSONE (DELTASONE) 10 MG tablet 5 mg.    predniSONE (DELTASONE) 2.5 MG tablet Take prednisone 3 pills daily for 2 weeks and then 2 tablets daily for 2 weeks and the one tablet daily    traMADol (ULTRAM) 50 mg tablet Take 1 tablet (50 mg total) by mouth every 6 (six) hours as needed for Pain.    walker (ULTRA-LIGHT ROLLATOR) Misc 1 Units by Misc.(Non-Drug; Combo Route) route once daily.     No current facility-administered medications for this visit.      ALLERGIES:   Review of patient's allergies indicates:   Allergen Reactions    Methotrexate analogues      Elevated Liver Enzyme    Bactrim [sulfamethoxazole-trimethoprim] Nausea And Vomiting and Rash       Review of Systems:     Review of Systems   Constitutional: Positive for fatigue. Negative for appetite change, chills, diaphoresis, fever and unexpected weight change.   HENT:   Negative for hearing loss, mouth sores, nosebleeds, sore throat, trouble swallowing and voice change.    Eyes: Negative for eye problems and icterus.    Respiratory: Negative for chest tightness, cough, hemoptysis, shortness of breath and wheezing.    Cardiovascular: Negative for chest pain, leg swelling and palpitations.   Gastrointestinal: Negative for abdominal distention, abdominal pain, blood in stool, diarrhea, nausea and vomiting.   Endocrine: Negative for hot flashes.   Genitourinary: Negative for bladder incontinence, difficulty urinating, dysuria and hematuria.    Musculoskeletal: Positive for myalgias. Negative for arthralgias, back pain, flank pain, gait problem, neck pain and neck stiffness.   Skin: Negative for itching, rash and wound.   Neurological: Negative for dizziness, extremity weakness, gait problem, headaches, numbness, seizures and speech difficulty.   Hematological: Negative for adenopathy. Does not bruise/bleed easily.   Psychiatric/Behavioral: Negative for confusion, depression and sleep disturbance. The patient is not nervous/anxious.         Physical Exam:     Vitals:    02/17/20 1329   BP: 133/61   Pulse: 86   Resp: 16   Temp: 98.5 °F (36.9 °C)       Physical Exam   Constitutional: She is oriented to person, place, and time. She appears well-developed and well-nourished. No distress.   HENT:   Head: Normocephalic and atraumatic.   Mouth/Throat: Oropharynx is clear and moist. No oropharyngeal exudate.   Hair has regrown     Eyes: Pupils are equal, round, and reactive to light. Conjunctivae and EOM are normal.   Neck: Normal range of motion. Neck supple. No JVD present. No tracheal deviation present. No thyromegaly present.   Cardiovascular: Normal rate, regular rhythm and normal heart sounds. Exam reveals no friction rub.   No murmur heard.  Pulmonary/Chest: Effort normal and breath sounds normal. No stridor. No respiratory distress. She has no wheezes. She has no rales. She exhibits no tenderness.   Abdominal: Soft. Bowel sounds are normal. She exhibits no distension. There is no tenderness. There is no rebound and no guarding.    Musculoskeletal: Normal range of motion. She exhibits no edema, tenderness or deformity.   Lymphadenopathy:     She has no axillary adenopathy.   Neurological: She is alert and oriented to person, place, and time. She displays normal reflexes. No cranial nerve deficit or sensory deficit. She exhibits normal muscle tone. Coordination normal.   Skin: Skin is warm and dry. Capillary refill takes less than 2 seconds. No rash noted. She is not diaphoretic. No erythema. No pallor.   Psychiatric: She has a normal mood and affect. Her behavior is normal. Judgment and thought content normal.       ECOG Performance Status: (foot note - ECOG PS provided by Eastern Cooperative Oncology Group) 1 - Symptomatic but completely ambulatory    Karnofsky Performance Score:  80%- Normal Activity with Effort: Some Symptoms of Disease    Labs:   Lab Results   Component Value Date    WBC 3.47 (L) 02/17/2020    HGB 11.8 (L) 02/17/2020    HCT 38.6 02/17/2020     (L) 02/17/2020    ALT 13 02/17/2020    AST 14 02/17/2020     02/17/2020    K 4.5 02/17/2020     02/17/2020    CREATININE 1.0 02/17/2020    BUN 14 02/17/2020    CO2 26 02/17/2020    TSH 1.913 10/17/2019    INR 1.1 10/16/2019       Imaging: previous imaging has been reviewed    Assessment and Plan:     Mrs. Mac is a pleasant 69 year old female with recently diagnosed AML.      Acute Monocytic Leukemia  -- Admitted from [facility] on [facility]/18 early AM with WBC around 100s, for suspected new non-M3 AML  --lo res HLA typing on 5/26/18 and hi res on 5/27/18 done in anticipation of possible need for future transplant   --Pt with two daughters, two full sisters (in their mid 60s, one with brain aneurysm hx, other one without any medical issues), and one full brother (61 y/o healthy).  --NPM1 +, CEBPA (-), FLT3 (+).  On Midostaurin 50 mg PO BID until Day 14 (held starting 6/8 to 6/11). Stopped when FLAG JENNIFER re-induction started. Restarted Midostaurin at 25 mg bid  on day 8 of FLAG JENNIFER induction (6/22), increase to full dose 50 mg bid (6/26) as improvement in liver enzymes. Stopped 7/3/18 due to abnormal cardiac echo.  --day 14 bone marrow biopsy completed 6/11 showing persistent disease with 15% CD 34 positive blasts  --Day 60 of FLAG-JENNIFER, tolerating without difficulty except nausea/vomiting  --day 14 restaging bone marrow biopsy done 6/29 without complication, results with no evidence of AML, FLT 3 negative, will need repeat marrow at count recovery outpatient   --recovery marrow showed no evidence of disease  --HiDAC #1 on 8/14/18, HiDAC#2 on 9/11/18  --Cycle 3 delayed 1 week due to pancytopenia. Started on 10/16/18  --recovery bone marrow showed no signs of residual leukemia  --Bone marrow biopsy done in May shows no overt signs of recurrent disease    Left sided jaw dysfunction  --MRI revealed degenerative changes  --Seen by ENT    Thrombocytopenia  --Previously in the 70-90 range  --100 today    Suspected Leukemia Cutis  --pathology from dermatology confirmed inflammatory changes  --now resolved with topical steroid cream    Abnormal Liver Function Tests  --ALT and AST and Alk phos have again increased substantially since re-starting midostaurin.   --midostaurin started day 8 at 25 mg bid, monitoring liver enzymes closely and improved. Increasing Midostaurin to 50 mg bid 6/26. Stopped Midostaurin (7/3) due to new diagnosis of systolic heart failure EF 35%.  --6/22/18 liver US showing mild intrahepatic dilation, otherwise impression of hepatic steatosis.  Will hold off of MRCP at this time.    --permanently discontinued midostaurin     Chemotherapy induced cardiomyopathy  --cardiology follow-up as outpatient in 3 months  --repeat 2D echo on 6/15 with preserved EF of 60%.  However 7/2/18 echo with reduced EF 30-35%  --Echo on 8/15/18 was within normal range EF of 60-65%    Rheumatologic issues  --Previously on steroids and methotrexate  --working with [facility]  rheumatology    30 minutes were spent face to face with the patient and her family to discuss the disease, natural history, treatment options and survival statistics. I have provided the patient with an opportunity to ask questions and have all questions answered to her satisfaction.     she will return to the clinic in 4 weeks, but knows to call in the interim if symptoms change or should a problem arise.    The following was staffed and discussed with supervising physician Dr. Troy.    Janice Waite MD PGY-V  Hematology/Oncology Fellow      I have seen the patient, reviewed the fellow's assessment and plan.  I have personally interviewed and examined the patient at bedside and agree with the fellow, with the following exceptions: Doing well with no signs of recurrence or relapse.    Laquita Troy MD  Hematology and Medical Oncology  Bone Marrow Transplant  [facility]

## 2020-02-24 ENCOUNTER — PATIENT MESSAGE (OUTPATIENT)
Dept: HEMATOLOGY/ONCOLOGY | Facility: CLINIC | Age: 70
End: 2020-02-24

## 2020-02-24 ENCOUNTER — PATIENT MESSAGE (OUTPATIENT)
Dept: RHEUMATOLOGY | Facility: CLINIC | Age: 70
End: 2020-02-24

## 2020-03-03 ENCOUNTER — OFFICE VISIT (OUTPATIENT)
Dept: RHEUMATOLOGY | Facility: CLINIC | Age: 70
End: 2020-03-03
Payer: MEDICARE

## 2020-03-03 ENCOUNTER — PATIENT MESSAGE (OUTPATIENT)
Dept: HEMATOLOGY/ONCOLOGY | Facility: CLINIC | Age: 70
End: 2020-03-03

## 2020-03-03 VITALS
HEART RATE: 102 BPM | SYSTOLIC BLOOD PRESSURE: 112 MMHG | DIASTOLIC BLOOD PRESSURE: 69 MMHG | BODY MASS INDEX: 31.69 KG/M2 | WEIGHT: 185.63 LBS | HEIGHT: 64 IN

## 2020-03-03 DIAGNOSIS — M25.50 POLYARTHRALGIA: Primary | ICD-10-CM

## 2020-03-03 PROCEDURE — 99999 PR PBB SHADOW E&M-EST. PATIENT-LVL IV: CPT | Mod: PBBFAC,,, | Performed by: INTERNAL MEDICINE

## 2020-03-03 PROCEDURE — 99214 PR OFFICE/OUTPT VISIT, EST, LEVL IV, 30-39 MIN: ICD-10-PCS | Mod: S$PBB,,, | Performed by: INTERNAL MEDICINE

## 2020-03-03 PROCEDURE — 99214 OFFICE O/P EST MOD 30 MIN: CPT | Mod: PBBFAC | Performed by: INTERNAL MEDICINE

## 2020-03-03 PROCEDURE — 99214 OFFICE O/P EST MOD 30 MIN: CPT | Mod: S$PBB,,, | Performed by: INTERNAL MEDICINE

## 2020-03-03 PROCEDURE — 99999 PR PBB SHADOW E&M-EST. PATIENT-LVL IV: ICD-10-PCS | Mod: PBBFAC,,, | Performed by: INTERNAL MEDICINE

## 2020-03-03 RX ORDER — FAMOTIDINE 20 MG/1
TABLET, FILM COATED ORAL
Status: ON HOLD | COMMUNITY
Start: 2020-02-29 | End: 2020-10-28 | Stop reason: HOSPADM

## 2020-03-03 ASSESSMENT — ROUTINE ASSESSMENT OF PATIENT INDEX DATA (RAPID3)
PATIENT GLOBAL ASSESSMENT SCORE: 6
MDHAQ FUNCTION SCORE: 3
FATIGUE SCORE: 7.5
PSYCHOLOGICAL DISTRESS SCORE: 1.1
AM STIFFNESS SCORE: 1, YES
PAIN SCORE: 5
TOTAL RAPID3 SCORE: 7
WHEN YOU AWAKENED IN THE MORNING OVER THE LAST WEEK, PLEASE INDICATE THE AMOUNT OF TIME IT TAKES UNTIL YOU ARE AS LIMBER AS YOU WILL BE FOR THE DAY: 30 MIN

## 2020-03-03 NOTE — PROGRESS NOTES
Subjective:       Patient ID: Sabrina Mac is a 68 y.o. female.    Chief Complaint: Disease Management    HPI   68 year old F with PMH of  AML s/p chemo in 2018, CHF, thrombocytopenia,undifferentiated connective tissue disease, osteoporosis here for evaluation.  IN September 2015, she was diagnosed with undifferentiated connective tissue disease. When she was diagnosed with connective tissue disease, she was having pain all over body.  She was having trouble getting dressed and getting up from chair. She reports she had +CORTES and +RF.  Denies ever having swelling. She was put on MTX and developed significant transaminitis and nausea. When she was on chemo, she did not have any joint pain. She finished chemotherapy in October, then her joints started getting worse. She was put on prednisone 10mg a day since march. She has pain in left shoulder and both knees. Pain level can be as high as 9/10,aching, non radiating.  Denies hip pain, headaches, or jaw claudication. Denies weakness.     Interval history: She is off prednisone for the last 2 weeks. She has pain in knees, ankles, and calves. Denies joint swelling.  Reports that she has stiffness in hands and feet, worse in the morning.  She is stiff for an hour and half.        Past Medical History:   Diagnosis Date    Cancer 03/2018    AML    CHF (congestive heart failure)     Diarrhea 9/14/2018    Encounter for blood transfusion        Review of Systems   Constitutional: Negative for activity change, appetite change, chills, diaphoresis, fatigue, fever and unexpected weight change.   HENT: Negative for congestion, ear discharge, ear pain, facial swelling, mouth sores, sinus pressure, sneezing, sore throat, tinnitus and trouble swallowing.    Eyes: Negative for photophobia, pain, discharge, redness, itching and visual disturbance.   Respiratory: Negative for apnea, chest tightness, shortness of breath, wheezing and stridor.    Cardiovascular: Negative for leg  "swelling.   Gastrointestinal: Negative for abdominal distention, abdominal pain, anal bleeding, blood in stool, constipation, diarrhea and nausea.   Endocrine: Negative for cold intolerance and heat intolerance.   Genitourinary: Negative for difficulty urinating and dysuria.   Musculoskeletal: Positive for arthralgias and joint swelling. Negative for back pain, gait problem, myalgias, neck pain and neck stiffness.   Skin: Negative for color change, pallor, rash and wound.   Neurological: Negative for dizziness, seizures, light-headedness and numbness.   Hematological: Negative for adenopathy. Does not bruise/bleed easily.   Psychiatric/Behavioral: Negative for sleep disturbance. The patient is not nervous/anxious.            Objective:   /70   Pulse 87   Ht 5' 2" (1.575 m)   Wt 67.6 kg (149 lb 0.5 oz)   LMP  (LMP Unknown)   BMI 27.26 kg/m²      Physical Exam   Constitutional: She is oriented to person, place, and time.   HENT:   Head: Normocephalic and atraumatic.   Right Ear: External ear normal.   Left Ear: External ear normal.   Nose: Nose normal.   Mouth/Throat: Oropharynx is clear and moist. No oropharyngeal exudate.   Eyes: Conjunctivae and EOM are normal. Pupils are equal, round, and reactive to light. Right eye exhibits no discharge. Left eye exhibits no discharge. No scleral icterus.   Neck: Neck supple. No JVD present. No thyromegaly present.   Cardiovascular: Normal rate, regular rhythm, normal heart sounds and intact distal pulses.  Exam reveals no gallop and no friction rub.    No murmur heard.  Pulmonary/Chest: Effort normal and breath sounds normal. No respiratory distress. She has no wheezes. She has no rales. She exhibits no tenderness.   Abdominal: Soft. Bowel sounds are normal. She exhibits no distension and no mass. There is no tenderness. There is no rebound and no guarding.   Lymphadenopathy:     She has no cervical adenopathy.   Neurological: She is alert and oriented to person, " place, and time. No cranial nerve deficit. Gait normal. Coordination normal.   Skin: Skin is dry. No rash noted. No erythema. No pallor.     Psychiatric: Affect and judgment normal.   Musculoskeletal: She exhibits no edema, tenderness or deformity.   Trouble rising from chair  Trouble making a fist with both hands       labs: reviewed     Assessment:   69 year old F with PMH of  AML s/p chemo in 2018, CHF, thrombocytopenia,undifferentiated connective tissue disease, osteoporosis here for evaluation.    She reports being diagnosed with undifferentiated connective tissue disease when she was presented with arthralgias. Reports she felt the best while on chemo when it comes to her joints.  She has +CORTES but no other features of connective tissue disease. She came in to me on prednisone. I told patient and daughter that I need to see how she is off prednisone to better determine her diagnosis.  She is off prednisone. On exam, I do not see evidence of synovitis.Will repeat labs off steroids.      No diagnosis found.        Plan:   Off steroids    NO MTX given reaction including transaminitis *

## 2020-03-24 ENCOUNTER — OFFICE VISIT (OUTPATIENT)
Dept: HEMATOLOGY/ONCOLOGY | Facility: CLINIC | Age: 70
End: 2020-03-24
Payer: MEDICARE

## 2020-03-24 DIAGNOSIS — F41.9 ANXIETY: Primary | ICD-10-CM

## 2020-03-24 DIAGNOSIS — C92.00 ACUTE MYELOID LEUKEMIA NOT HAVING ACHIEVED REMISSION: Primary | ICD-10-CM

## 2020-03-24 DIAGNOSIS — K12.31 MUCOSITIS DUE TO CHEMOTHERAPY: ICD-10-CM

## 2020-03-24 DIAGNOSIS — Z86.79 HISTORY OF VENTRICULAR FIBRILLATION: ICD-10-CM

## 2020-03-24 DIAGNOSIS — M79.604 PAIN IN BOTH LOWER EXTREMITIES: ICD-10-CM

## 2020-03-24 DIAGNOSIS — G93.40 ACUTE ENCEPHALOPATHY: ICD-10-CM

## 2020-03-24 DIAGNOSIS — M79.605 PAIN IN BOTH LOWER EXTREMITIES: ICD-10-CM

## 2020-03-24 PROCEDURE — 99215 PR OFFICE/OUTPT VISIT, EST, LEVL V, 40-54 MIN: ICD-10-PCS | Mod: 95,,, | Performed by: INTERNAL MEDICINE

## 2020-03-24 PROCEDURE — 99215 OFFICE O/P EST HI 40 MIN: CPT | Mod: 95,,, | Performed by: INTERNAL MEDICINE

## 2020-03-24 RX ORDER — OXYCODONE AND ACETAMINOPHEN 7.5; 325 MG/1; MG/1
1 TABLET ORAL EVERY 4 HOURS PRN
Qty: 90 TABLET | Refills: 0 | Status: ON HOLD | OUTPATIENT
Start: 2020-03-24 | End: 2020-10-28 | Stop reason: HOSPADM

## 2020-03-24 RX ORDER — ALPRAZOLAM 1 MG/1
0.5 TABLET ORAL NIGHTLY PRN
Qty: 60 TABLET | Refills: 0 | Status: ON HOLD | OUTPATIENT
Start: 2020-03-24 | End: 2020-10-28 | Stop reason: HOSPADM

## 2020-03-30 NOTE — PROGRESS NOTES
Hematology and Medical Oncology   Follow Up Note -- Virtual Visit     03/24/2020    Primary Oncologic Diagnosis: AML, FLT 3 + and NPMN1+.     The patient location is: home  The chief complaint leading to consultation is: AML follow up  Visit type: Virtual visit with synchronous audio and video  Total time spent with patient: 30  Each patient to whom he or she provides medical services by telemedicine is:  (1) informed of the relationship between the physician and patient and the respective role of any other health care provider with respect to management of the patient; and (2) notified that he or she may decline to receive medical services by telemedicine and may withdraw from such care at any time.      History of Present Ilness:   Sabrina Badillo) is a pleasant 69 y.o.female presents to clinic following 2 induction therapies for AML. She was in the hospital roughly 50 days to receive 7+3 and then FLAG JENNIFER.    Oncology History:   68 y.o. female admitted overnight for possible new AML. Came in from OSH with elevated WBC of 100.   05/25/2018: Pt is now on hydrea 2 grams BID, allopurinol 300 mg daily, and IVF. Pt may need rasburicase this weekend. She will undergo a BM bx and aspiration today. She is an induction candidate and will not be screened for research trials. She has anxiety for which she usually takes xanax, this was re-started.   05/26/2018 PICC placed. Reports she slept well last night. Anxiety improved with prn xanax. EF 65%, HIV and Hep panel negative. 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  6/12/2018: Day 15 of 7+3 induction, restaging BM bx done yesterday. On Midostaurin. Fever yesterday and BC with gram + cocci in clusters. On cefepime day 2 and will start Vanc today. Labial mucositis improving.   6/13/2018: Day 16 7+3. BC with staph aureus, identification pending. Surveillance blood cultures done today. Afebrile x 48 hours. On cefepime and Vanc. Labial mucositis resolved. No complaints of  pain. Nausea controlled. No diarrhea. Primary complaint is fatigue.   6/14/20018: Day 17 of 7+3. Day 14 bone marrow results pending. BC with staph epidermis only sensitive to Vancomycin. Vanc day 3 and cefepime day 4. Vanc trough 12.2 last night. BP remains low but stable. Afebrile x 72 hours.   6/15/2018: Day 18 of 7+3. Day 14 bone marrow biopsy shows persistent disease with 15% blasts. Discussion with patient regarding treatment and she is deciding if she wants to proceed with re-induction vs outpatient maintenance. Temp of 100.4 overnight, unsustained. Day 5 Cefepime and Day 4 of Vanc for staph epidermis. Repeat cultures NGTD. BP improved. Electrolytes (mag, phos, K and calcium) replaced aggressively this am and will repeat labs this afternoon. No complaints this am.   06/16/2018: Day 19 of 7+3. Day 2 of Flag-JENNIFER. Remains afebrile. Calcium remains low and have increased PO supplementation. Remains on vanc and aztreonam. Somewhat loopy feeling after receiving benadryl.   06/17/2018: Day 20 of 7+3. Day 3 of FLAG-JENNIFER. Multiple electrolyte abnormalities. New bilateral leg and feet swelling.   6/18/2018: Day 21 of 7+3 and Day 4 of FLAG JENNIFER. Tolerating well with some nausea controlled with Zofran. VSS, afebrile. ANC 1900 on Neupogen. Continues Vanc for staph epi bacteremia. Multiple electrolytes replaced today. Complains of edema to lower extremities, not improved after 20 of lasix yesterday. No sob or CP.   6/19/18: Day 22 of 7+3 and Day 5 of FLAG JENNIFER. VSS, afebrile, ANC 3900. Vanc trough elevated and dose adjusted this am. Lower extremity edema improved after 40 of lasix yesterday, net negative 300 cc I&O. Mild nausea, no abdominal pain or diarrhea. Poor appetite but no difficulty eating or taking pills.   6/20/18: Day 23 of 7+3 and Day 6 of FLAG JENNIFER. Patient complains of nausea and vomiting overnight and this am, especially when taking pills. Will add Zyprexa daily in addition to Zofran, compazine, and ativan PRN  and will change meds to IV. 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 Afebrile.  No diarrhea, mouth sores or pain.  06/21/2018: Day 24 of 7+3 and Day 7 of FLAG JENNIFER. Nausea better controlled. Continued on Vanc.  6/22/2018: Day 25 of 7+3 and Day 8 of FLAG JENNIFER. Nausea improved some with Zyprexa but did have 1 episode of emesis overnight.continuing meds IV due to vomiting. Remains afebrile. ANC 0. Continues Vanc and Flagyl. Electrolytes replaced.   06/23/2018 : day 26 of 7+3 and Day 9 of FLAG JENNIFER.  Nausea persists, worsened after taking pills so meds converted to IV.  Encouraged ambulation.  Continue with flagyl for leptotrichia goodfellowii.  RUQ US showed intrahepatic dilation, overall impression hepatic steatosis.  CBD 0.5cm.  No Gallbladder.    06/24/2018 : day 27 of 7+3 and Day 10 of FLAG JNENIFER.  Nausea persisting, able to tolerate some po pills.  Last day of flagyl (day 5).  Still pancytopenic. Appetite still low as po intake triggers nausea.  06/25/2018: day 28 of 7+3 and Day 11 of FLAG JENNIFER. Afebrile, ANC 0. Has completed Flagyl. Will decrease Vanc to 1000 mg q12, trough 19.9, will complete Vanc on 6/27. Liver enzymes stable on Midostaurin. VSS.   6/26/2018: day 29 of 7+3 and day 12 of FLAG JENNIFER. Liver enzymes improved and Midostaurin increased to full dose 50 mg bid. Nausea controlled, afebrile, VSS, NEON.  6/27/2018: day 30 of 7+3 and Day 13 of FLAG JENNIFER. Persistent nausea and emesis x 1 yesterday. Compazine changed to scheduled. Vanc ends today. Afebrile, VSS. Aggressive electrolyte replacement, low electrolytes possibly due to Midostaurin.   6/28/2018: day 31 of 7+3 and Day 14 of FLAG JENNIFER. Nausea improved with scheduled Compazine. Patient has agreed to start converting some IV meds to po. VSS, afebrile, electrolytes wnl today. Only complains of fatigue, new rash noted to upper back and chest and legs, papular rash mildly red.  6/29/2018: Day  32 of 7+3 and Day 15 of FLAG JENNIFER. Day 14 restaging bone marrow biopsy done at bedside today. Patient states nausea improved but she did have emesis last night after taking 9 pm pills. Rash improved. No mouth sores, diarrhea or pain.   7/2/2018: Day 35 of 7+3 and Day 18 of FLAG JENNIFER. Restaging BM bx results pending. Fluid overload over the weekend. Chest x-ray with pulmonary edema. Os sats down to 88%. Placed on O2 at 2 L NC, off O2 this am. Received lasix. Net negative 2.7 L today. 1 episode of nausea, no emesis. Reports anxiety relieved with PRN meds. Electrolytes improved, afebrile, VSS.   7/3/2018: Day 36 of 7+3 and Day 19 of FLAG JENNIFER. Restaging Bm bx with GABRIEL. Cardiology consulted for abnormal echo, EF 30% with pulmonary HTN and severe L atrial enlargement. EKG with T wave abnormality, possible anterolateral ischemia and prolonged QTc of 530. Medications adjusted. Nausea controlled, no diarrhea. Electrolytes improved. On O2 as needed.   07/04/2018 : Day 37 of 7+3 and Day 20 of FLAG JENNIFER Diarrhea in AM, watery, no associated abdominal pain, nausea, or vomiting.    07/05/2018: Day 38 of 7+3 ane Day 21 of FLAG JENNIFER. No CP or SOB, cardiology following. On RA. All meds changed to po, denies nausea or vomiting and no diarrhea. VSS, afebrile.   7/6/2018: Day 22 of FLAG JENNIFER. Continues to tolerate po meds with no nausea. Afebrile. Awaiting count recovery for discharge.  7/7/2018-/8/2018: Day 40/41 of 7+3, Day 23/24 of FLAG JENNIFER.  Improving clinically.  Started on mag oxide per patient request.  Labs showing signs of ANC recovery.  7/9/2018: Day 25 FLAG JENNIFER, , continues Neupogen. Received platelets today. Afebrile, VSS. Tolerating all oral meds with no nausea or vomiting. Only complains of fatigue.   7/10/2018: Day 26 FLAG JENNIFER,  today. Had 2 episodes of vomiting and diarrhea last night. Feeling better. Afebrile, VSS.   7/11/2018: Day 27 FLAG JENNIFER,  today. No vomiting or diarrhea overnight and no nausea.  Afebrile, VSS. Complains of back pain (bone pain) suspect due to count recovery. Relieved with oxy IR and Zyrtec started.  07/12/2018: Day 28 flag jennifer, engrafted today with ANC of 730. Back pain improved with zyrtec. Scheduled for IT chemo tomorrow at 1:30 pm and discharge home thereafter. Will likely need plt transfusion prior to IT chemo tomorrow   7/13/2018: Day 29 of FLAG JENNIFER. ANC up to 1300 today. Transfusing platelets today prior to LP for IT chemo. Patient continues to complain of bone pain, mostly to back and legs. No nausea, diarrhea, sob. Afebrile and VSS.   --hospitalized 7/23-7/25 for C.diff diarrhea, neutropenic fever.  While inpatient she developed pink blisters on her palm that were concerning for relapse.  --palm biopsy on 7/26/18 for suspicion of leukemia cutis was also negative for sign of relapse  --bone marrow biopsy 7/31/18 was negative for signs of relapse   --Admitted on 8/14/18 for HiDAC#1  --Admitted on 9/11/18 for HiDAC#2  --Admitted on 10/16/18 for HiDAC#3  --Post consolidation bone marrow biopsy on 12/27/2018: AML FISH (BM) Interpretation: The result is within normal limits for the AML FISH panel. No evidence of residual disease.  --Bone Marrow Biopsy on 5/9/19 NO MORPHOLOGIC EVIDENCE OF RESIDUAL ACUTE MYELOID LEUKEMIA. RECOMMEND NGS STUDY TO RULE OUT RESIDUAL DISEASE. deletion 20q    Interval History:   Mrs. Wilkes continues to do quite well. We were able to see each other well through facetime without issue. She has been staying home and no visitors are allowed inside during this pandemic time. Stable myalgias in lower extremities. She continues to be more active and not requiring naps like she previously did. Denies fevers/chills, night sweats, lymphadenopathy, bleeding/bruising.    Past Medical History:   Past Medical History:   Diagnosis Date    Cancer 03/2018    AML    CHF (congestive heart failure)     Diarrhea 9/14/2018    Encounter for blood transfusion        Current  Medications:   Current Outpatient Medications   Medication Sig    ALPRAZolam (XANAX) 0.5 MG tablet Take 0.5 tablets (0.25 mg total) by mouth 3 (three) times daily as needed for Insomnia or Anxiety.    ALPRAZolam (XANAX) 1 MG tablet Take 0.5 tablets (0.5 mg total) by mouth nightly as needed for Insomnia.    clotrimazole (MYCELEX) 10 mg sultana     diphenhydrAMINE (BENADRYL) 50 MG capsule Take 50 mg by mouth nightly as needed for Itching or Insomnia.    diphenhydrAMINE (BENADRYL) 50 MG capsule Take 1 capsule (50 mg total) by mouth nightly as needed for Itching.    escitalopram oxalate (LEXAPRO) 20 MG tablet Take 20 mg by mouth.    famotidine (PEPCID) 20 MG tablet     famotidine (PEPCID) 40 MG tablet Take 1 tablet (40 mg total) by mouth nightly.    ibuprofen (ADVIL,MOTRIN) 600 MG tablet Take 1 tablet (600 mg total) by mouth every 6 (six) hours as needed (left ear/jaw pain).    LORazepam (ATIVAN) 0.5 MG tablet Take 1 tablet (0.5 mg total) by mouth every 6 (six) hours as needed (nausea).    metoprolol succinate (TOPROL XL) 25 MG 24 hr tablet Take 1 tablet (25 mg total) by mouth once daily.    ofloxacin (FLOXIN) 0.3 % otic solution Place 5 drops into the left ear once daily.    omeprazole (PRILOSEC OTC) 20 MG tablet Take 20 mg by mouth every morning.    omeprazole (PRILOSEC) 40 MG capsule Take 1 capsule (40 mg total) by mouth before breakfast. Take on empty stomach 30-60 minutes before meal    ondansetron (ZOFRAN) 8 MG tablet     oxyCODONE-acetaminophen (PERCOCET) 7.5-325 mg per tablet Take 1 tablet by mouth every 4 (four) hours as needed for Pain.    predniSONE (DELTASONE) 10 MG tablet TAKE ONE TABLET BY MOUTH ONCE DAILY AS NEEDED FOR ARTHRITIS FLARE    predniSONE (DELTASONE) 10 MG tablet 5 mg.    predniSONE (DELTASONE) 2.5 MG tablet Take prednisone 3 pills daily for 2 weeks and then 2 tablets daily for 2 weeks and the one tablet daily    traMADol (ULTRAM) 50 mg tablet Take 1 tablet (50 mg total) by  mouth every 6 (six) hours as needed for Pain.    walker (ULTRA-LIGHT ROLLATOR) Misc 1 Units by Misc.(Non-Drug; Combo Route) route once daily.     No current facility-administered medications for this visit.      ALLERGIES:   Review of patient's allergies indicates:   Allergen Reactions    Methotrexate analogues      Elevated Liver Enzyme    Bactrim [sulfamethoxazole-trimethoprim] Nausea And Vomiting and Rash       Review of Systems:     Review of Systems   Constitutional: Positive for fatigue. Negative for appetite change, chills, diaphoresis, fever and unexpected weight change.   HENT:   Negative for hearing loss, mouth sores, nosebleeds, sore throat, trouble swallowing and voice change.    Eyes: Negative for eye problems and icterus.   Respiratory: Negative for chest tightness, cough, hemoptysis, shortness of breath and wheezing.    Cardiovascular: Negative for chest pain, leg swelling and palpitations.   Gastrointestinal: Negative for abdominal distention, abdominal pain, blood in stool, diarrhea, nausea and vomiting.   Endocrine: Negative for hot flashes.   Genitourinary: Negative for bladder incontinence, difficulty urinating, dysuria and hematuria.    Musculoskeletal: Positive for myalgias. Negative for arthralgias, back pain, flank pain, gait problem, neck pain and neck stiffness.   Skin: Negative for itching, rash and wound.   Neurological: Negative for dizziness, extremity weakness, gait problem, headaches, numbness, seizures and speech difficulty.   Hematological: Negative for adenopathy. Does not bruise/bleed easily.   Psychiatric/Behavioral: Negative for confusion, depression and sleep disturbance. The patient is not nervous/anxious.         Physical Exam:   Limited by virtual visit    ECOG Performance Status: (foot note - ECOG PS provided by Eastern Cooperative Oncology Group) 1 - Symptomatic but completely ambulatory    Karnofsky Performance Score:  80%- Normal Activity with Effort: Some Symptoms of  Disease    Labs:   Lab Results   Component Value Date    WBC 3.47 (L) 02/17/2020    HGB 11.8 (L) 02/17/2020    HCT 38.6 02/17/2020     (L) 02/17/2020    ALT 13 02/17/2020    AST 14 02/17/2020     02/17/2020    K 4.5 02/17/2020     02/17/2020    CREATININE 1.0 02/17/2020    BUN 14 02/17/2020    CO2 26 02/17/2020    TSH 1.913 10/17/2019    INR 1.1 10/16/2019     Labs done on 3/23 in [place] were reviewed.    Imaging: previous imaging has been reviewed    Assessment and Plan:     Mrs. Mac is a pleasant 69 year old female with recently diagnosed AML.      Acute Monocytic Leukemia  -- Admitted from [facility] on 5/25/18 early AM with WBC around 100s, for suspected new non-M3 AML  --lo res HLA typing on 5/26/18 and hi res on 5/27/18 done in anticipation of possible need for future transplant   --Pt with two daughters, two full sisters (in their mid 60s, one with brain aneurysm hx, other one without any medical issues), and one full brother (59 y/o healthy).  --NPM1 +, CEBPA (-), FLT3 (+).  On Midostaurin 50 mg PO BID until Day 14 (held starting 6/8 to 6/11). Stopped when FLAG JENNIFER re-induction started. Restarted Midostaurin at 25 mg bid on day 8 of FLAG JENNIFER induction (6/22), increase to full dose 50 mg bid (6/26) as improvement in liver enzymes. Stopped 7/3/18 due to abnormal cardiac echo.  --day 14 bone marrow biopsy completed 6/11 showing persistent disease with 15% CD 34 positive blasts  --Day 60 of FLAG-JENNIFER, tolerating without difficulty except nausea/vomiting  --day 14 restaging bone marrow biopsy done 6/29 without complication, results with no evidence of AML, FLT 3 negative, will need repeat marrow at count recovery outpatient   --recovery marrow showed no evidence of disease  --HiDAC #1 on 8/14/18, HiDAC#2 on 9/11/18  --Cycle 3 delayed 1 week due to pancytopenia. Started on 10/16/18  --recovery bone marrow showed no signs of residual leukemia  --Bone marrow biopsy done in May 2019  shows no overt signs of recurrent disease    Left sided jaw dysfunction  --MRI revealed degenerative changes  --Seen by ENT    Thrombocytopenia  --Previously in the 70-90 range  --120 today    Suspected Leukemia Cutis  --pathology from dermatology confirmed inflammatory changes  --now resolved with topical steroid cream    Abnormal Liver Function Tests  --ALT and AST and Alk phos have again increased substantially since re-starting midostaurin.   --midostaurin started day 8 at 25 mg bid, monitoring liver enzymes closely and improved. Increasing Midostaurin to 50 mg bid 6/26. Stopped Midostaurin (7/3) due to new diagnosis of systolic heart failure EF 35%.  --6/22/18 liver US showing mild intrahepatic dilation, otherwise impression of hepatic steatosis.  Will hold off of MRCP at this time.    --permanently discontinued midostaurin     Chemotherapy induced cardiomyopathy  --cardiology follow-up as outpatient in 3 months  --repeat 2D echo on 6/15 with preserved EF of 60%.  However 7/2/18 echo with reduced EF 30-35%  --Echo on 8/15/18 was within normal range EF of 60-65%    Rheumatologic issues  --Previously on steroids and methotrexate  --working with [facility] rheumatology    30 minutes were spent face to face with the patient and her family to discuss the disease, natural history, treatment options and survival statistics. I have provided the patient with an opportunity to ask questions and have all questions answered to her satisfaction.     she will return to the clinic in 4 weeks, but knows to call in the interim if symptoms change or should a problem arise.        Laquita Troy MD  Hematology and Medical Oncology  Bone Marrow Transplant  [facility]

## 2020-04-29 ENCOUNTER — TELEPHONE (OUTPATIENT)
Dept: HEMATOLOGY/ONCOLOGY | Facility: CLINIC | Age: 70
End: 2020-04-29

## 2020-05-11 ENCOUNTER — TELEPHONE (OUTPATIENT)
Dept: HEMATOLOGY/ONCOLOGY | Facility: CLINIC | Age: 70
End: 2020-05-11

## 2020-05-11 NOTE — TELEPHONE ENCOUNTER
----- Message from Latasha Porras sent at 5/11/2020 12:53 PM CDT -----  Contact: PT's    Called to speak to Liliam. Wants to make an appointment. Please call back     Callback: [phone]

## 2020-05-12 ENCOUNTER — OFFICE VISIT (OUTPATIENT)
Dept: HEMATOLOGY/ONCOLOGY | Facility: CLINIC | Age: 70
End: 2020-05-12
Payer: MEDICARE

## 2020-05-12 DIAGNOSIS — C92.00 ACUTE MYELOID LEUKEMIA NOT HAVING ACHIEVED REMISSION: Primary | ICD-10-CM

## 2020-05-12 DIAGNOSIS — R79.89 ELEVATED LFTS: ICD-10-CM

## 2020-05-12 DIAGNOSIS — M54.6 ACUTE BILATERAL THORACIC BACK PAIN: ICD-10-CM

## 2020-05-12 PROCEDURE — 99215 OFFICE O/P EST HI 40 MIN: CPT | Mod: 95,,, | Performed by: INTERNAL MEDICINE

## 2020-05-12 PROCEDURE — 99215 PR OFFICE/OUTPT VISIT, EST, LEVL V, 40-54 MIN: ICD-10-PCS | Mod: 95,,, | Performed by: INTERNAL MEDICINE

## 2020-05-12 NOTE — Clinical Note
1. Labs in [facility] mid June2. See me virtually toward end of June any day i'm in clinic

## 2020-05-13 NOTE — PROGRESS NOTES
Hematology and Medical Oncology   Follow Up Note -- Virtual Visit     05/13/2020    Primary Oncologic Diagnosis: AML, FLT 3 + and NPMN1+.     The patient location is: home  The chief complaint leading to consultation is: AML follow up  Visit type: Virtual visit with synchronous audio and video  Total time spent with patient: 30  Each patient to whom he or she provides medical services by telemedicine is:  (1) informed of the relationship between the physician and patient and the respective role of any other health care provider with respect to management of the patient; and (2) notified that he or she may decline to receive medical services by telemedicine and may withdraw from such care at any time.      History of Present Ilness:   Sabrina Badillo) is a pleasant 69 y.o.female presents to clinic following 2 induction therapies for AML. She was in the hospital roughly 50 days to receive 7+3 and then FLAG JENNIFER.    Oncology History:   68 y.o. female admitted overnight for possible new AML. Came in from OSH with elevated WBC of 100.   05/25/2018: Pt is now on hydrea 2 grams BID, allopurinol 300 mg daily, and IVF. Pt may need rasburicase this weekend. She will undergo a BM bx and aspiration today. She is an induction candidate and will not be screened for research trials. She has anxiety for which she usually takes xanax, this was re-started.   05/26/2018 PICC placed. Reports she slept well last night. Anxiety improved with prn xanax. EF 65%, HIV and Hep panel negative. 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  6/12/2018: Day 15 of 7+3 induction, restaging BM bx done yesterday. On Midostaurin. Fever yesterday and BC with gram + cocci in clusters. On cefepime day 2 and will start Vanc today. Labial mucositis improving.   6/13/2018: Day 16 7+3. BC with staph aureus, identification pending. Surveillance blood cultures done today. Afebrile x 48 hours. On cefepime and Vanc. Labial mucositis resolved. No complaints of  pain. Nausea controlled. No diarrhea. Primary complaint is fatigue.   6/14/20018: Day 17 of 7+3. Day 14 bone marrow results pending. BC with staph epidermis only sensitive to Vancomycin. Vanc day 3 and cefepime day 4. Vanc trough 12.2 last night. BP remains low but stable. Afebrile x 72 hours.   6/15/2018: Day 18 of 7+3. Day 14 bone marrow biopsy shows persistent disease with 15% blasts. Discussion with patient regarding treatment and she is deciding if she wants to proceed with re-induction vs outpatient maintenance. Temp of 100.4 overnight, unsustained. Day 5 Cefepime and Day 4 of Vanc for staph epidermis. Repeat cultures NGTD. BP improved. Electrolytes (mag, phos, K and calcium) replaced aggressively this am and will repeat labs this afternoon. No complaints this am.   06/16/2018: Day 19 of 7+3. Day 2 of Flag-JENNIFER. Remains afebrile. Calcium remains low and have increased PO supplementation. Remains on vanc and aztreonam. Somewhat loopy feeling after receiving benadryl.   06/17/2018: Day 20 of 7+3. Day 3 of FLAG-JENNIFER. Multiple electrolyte abnormalities. New bilateral leg and feet swelling.   6/18/2018: Day 21 of 7+3 and Day 4 of FLAG JENNIFER. Tolerating well with some nausea controlled with Zofran. VSS, afebrile. ANC 1900 on Neupogen. Continues Vanc for staph epi bacteremia. Multiple electrolytes replaced today. Complains of edema to lower extremities, not improved after 20 of lasix yesterday. No sob or CP.   6/19/18: Day 22 of 7+3 and Day 5 of FLAG JENNIFER. VSS, afebrile, ANC 3900. Vanc trough elevated and dose adjusted this am. Lower extremity edema improved after 40 of lasix yesterday, net negative 300 cc I&O. Mild nausea, no abdominal pain or diarrhea. Poor appetite but no difficulty eating or taking pills.   6/20/18: Day 23 of 7+3 and Day 6 of FLAG JENNIFER. Patient complains of nausea and vomiting overnight and this am, especially when taking pills. Will add Zyprexa daily in addition to Zofran, compazine, and ativan PRN  and will change meds to IV. 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 Afebrile.  No diarrhea, mouth sores or pain.  06/21/2018: Day 24 of 7+3 and Day 7 of FLAG JENNIFER. Nausea better controlled. Continued on Vanc.  6/22/2018: Day 25 of 7+3 and Day 8 of FLAG JENNIFER. Nausea improved some with Zyprexa but did have 1 episode of emesis overnight.continuing meds IV due to vomiting. Remains afebrile. ANC 0. Continues Vanc and Flagyl. Electrolytes replaced.   06/23/2018 : day 26 of 7+3 and Day 9 of FLAG JENNIFER.  Nausea persists, worsened after taking pills so meds converted to IV.  Encouraged ambulation.  Continue with flagyl for leptotrichia goodfellowii.  RUQ US showed intrahepatic dilation, overall impression hepatic steatosis.  CBD 0.5cm.  No Gallbladder.    06/24/2018 : day 27 of 7+3 and Day 10 of FLAG JENNIFER.  Nausea persisting, able to tolerate some po pills.  Last day of flagyl (day 5).  Still pancytopenic. Appetite still low as po intake triggers nausea.  06/25/2018: day 28 of 7+3 and Day 11 of FLAG JENNIFER. Afebrile, ANC 0. Has completed Flagyl. Will decrease Vanc to 1000 mg q12, trough 19.9, will complete Vanc on 6/27. Liver enzymes stable on Midostaurin. VSS.   6/26/2018: day 29 of 7+3 and day 12 of FLAG JENNIFER. Liver enzymes improved and Midostaurin increased to full dose 50 mg bid. Nausea controlled, afebrile, VSS, NEON.  6/27/2018: day 30 of 7+3 and Day 13 of FLAG JENNIFER. Persistent nausea and emesis x 1 yesterday. Compazine changed to scheduled. Vanc ends today. Afebrile, VSS. Aggressive electrolyte replacement, low electrolytes possibly due to Midostaurin.   6/28/2018: day 31 of 7+3 and Day 14 of FLAG JENNIFER. Nausea improved with scheduled Compazine. Patient has agreed to start converting some IV meds to po. VSS, afebrile, electrolytes wnl today. Only complains of fatigue, new rash noted to upper back and chest and legs, papular rash mildly red.  6/29/2018: Day  32 of 7+3 and Day 15 of FLAG JENNIFER. Day 14 restaging bone marrow biopsy done at bedside today. Patient states nausea improved but she did have emesis last night after taking 9 pm pills. Rash improved. No mouth sores, diarrhea or pain.   7/2/2018: Day 35 of 7+3 and Day 18 of FLAG JENNIFER. Restaging BM bx results pending. Fluid overload over the weekend. Chest x-ray with pulmonary edema. Os sats down to 88%. Placed on O2 at 2 L NC, off O2 this am. Received lasix. Net negative 2.7 L today. 1 episode of nausea, no emesis. Reports anxiety relieved with PRN meds. Electrolytes improved, afebrile, VSS.   7/3/2018: Day 36 of 7+3 and Day 19 of FLAG JENNIFER. Restaging Bm bx with GABRIEL. Cardiology consulted for abnormal echo, EF 30% with pulmonary HTN and severe L atrial enlargement. EKG with T wave abnormality, possible anterolateral ischemia and prolonged QTc of 530. Medications adjusted. Nausea controlled, no diarrhea. Electrolytes improved. On O2 as needed.   07/04/2018 : Day 37 of 7+3 and Day 20 of FLAG JENNIFER Diarrhea in AM, watery, no associated abdominal pain, nausea, or vomiting.    07/05/2018: Day 38 of 7+3 ane Day 21 of FLAG JENNIFER. No CP or SOB, cardiology following. On RA. All meds changed to po, denies nausea or vomiting and no diarrhea. VSS, afebrile.   7/6/2018: Day 22 of FLAG JENNIFER. Continues to tolerate po meds with no nausea. Afebrile. Awaiting count recovery for discharge.  7/7/2018-/8/2018: Day 40/41 of 7+3, Day 23/24 of FLAG JENNIFER.  Improving clinically.  Started on mag oxide per patient request.  Labs showing signs of ANC recovery.  7/9/2018: Day 25 FLAG JENNIFER, , continues Neupogen. Received platelets today. Afebrile, VSS. Tolerating all oral meds with no nausea or vomiting. Only complains of fatigue.   7/10/2018: Day 26 FLAG JENNIFER,  today. Had 2 episodes of vomiting and diarrhea last night. Feeling better. Afebrile, VSS.   7/11/2018: Day 27 FLAG JENNIFER,  today. No vomiting or diarrhea overnight and no nausea.  Afebrile, VSS. Complains of back pain (bone pain) suspect due to count recovery. Relieved with oxy IR and Zyrtec started.  07/12/2018: Day 28 flag jennifer, engrafted today with ANC of 730. Back pain improved with zyrtec. Scheduled for IT chemo tomorrow at 1:30 pm and discharge home thereafter. Will likely need plt transfusion prior to IT chemo tomorrow   7/13/2018: Day 29 of FLAG JENNIFER. ANC up to 1300 today. Transfusing platelets today prior to LP for IT chemo. Patient continues to complain of bone pain, mostly to back and legs. No nausea, diarrhea, sob. Afebrile and VSS.   --hospitalized 7/23-7/25 for C.diff diarrhea, neutropenic fever.  While inpatient she developed pink blisters on her palm that were concerning for relapse.  --palm biopsy on 7/26/18 for suspicion of leukemia cutis was also negative for sign of relapse  --bone marrow biopsy 7/31/18 was negative for signs of relapse   --Admitted on 8/14/18 for HiDAC#1  --Admitted on 9/11/18 for HiDAC#2  --Admitted on 10/16/18 for HiDAC#3  --Post consolidation bone marrow biopsy on 12/27/2018: AML FISH (BM) Interpretation: The result is within normal limits for the AML FISH panel. No evidence of residual disease.  --Bone Marrow Biopsy on 5/9/19 NO MORPHOLOGIC EVIDENCE OF RESIDUAL ACUTE MYELOID LEUKEMIA. RECOMMEND NGS STUDY TO RULE OUT RESIDUAL DISEASE. deletion 20q    Interval History:   Mrs. Wilkes continues to do quite well. We were able to see each other well through facetime without issue. She has been staying home and no visitors are allowed inside during this pandemic time. Stable myalgias in lower extremities. She continues to be more active and not requiring naps like she previously did. She spends the morning facetiming with one grandchild to learn alphabets. Denies fevers/chills, night sweats, lymphadenopathy, bleeding/bruising.    Past Medical History:   Past Medical History:   Diagnosis Date    Cancer 03/2018    AML    CHF (congestive heart failure)      Diarrhea 9/14/2018    Encounter for blood transfusion        Current Medications:   Current Outpatient Medications   Medication Sig    ALPRAZolam (XANAX) 0.5 MG tablet Take 0.5 tablets (0.25 mg total) by mouth 3 (three) times daily as needed for Insomnia or Anxiety.    ALPRAZolam (XANAX) 1 MG tablet Take 0.5 tablets (0.5 mg total) by mouth nightly as needed for Insomnia.    clotrimazole (MYCELEX) 10 mg sultana     diphenhydrAMINE (BENADRYL) 50 MG capsule Take 50 mg by mouth nightly as needed for Itching or Insomnia.    escitalopram oxalate (LEXAPRO) 20 MG tablet Take 20 mg by mouth.    famotidine (PEPCID) 20 MG tablet     famotidine (PEPCID) 40 MG tablet Take 1 tablet (40 mg total) by mouth nightly.    ibuprofen (ADVIL,MOTRIN) 600 MG tablet Take 1 tablet (600 mg total) by mouth every 6 (six) hours as needed (left ear/jaw pain).    LORazepam (ATIVAN) 0.5 MG tablet Take 1 tablet (0.5 mg total) by mouth every 6 (six) hours as needed (nausea).    metoprolol succinate (TOPROL XL) 25 MG 24 hr tablet Take 1 tablet (25 mg total) by mouth once daily.    ofloxacin (FLOXIN) 0.3 % otic solution Place 5 drops into the left ear once daily.    omeprazole (PRILOSEC OTC) 20 MG tablet Take 20 mg by mouth every morning.    omeprazole (PRILOSEC) 40 MG capsule Take 1 capsule (40 mg total) by mouth before breakfast. Take on empty stomach 30-60 minutes before meal    ondansetron (ZOFRAN) 8 MG tablet     oxyCODONE-acetaminophen (PERCOCET) 7.5-325 mg per tablet Take 1 tablet by mouth every 4 (four) hours as needed for Pain.    predniSONE (DELTASONE) 10 MG tablet TAKE ONE TABLET BY MOUTH ONCE DAILY AS NEEDED FOR ARTHRITIS FLARE    predniSONE (DELTASONE) 10 MG tablet 5 mg.    predniSONE (DELTASONE) 2.5 MG tablet Take prednisone 3 pills daily for 2 weeks and then 2 tablets daily for 2 weeks and the one tablet daily    traMADol (ULTRAM) 50 mg tablet Take 1 tablet (50 mg total) by mouth every 6 (six) hours as needed for Pain.     walker (ULTRA-LIGHT ROLLATOR) Misc 1 Units by Misc.(Non-Drug; Combo Route) route once daily.     No current facility-administered medications for this visit.      ALLERGIES:   Review of patient's allergies indicates:   Allergen Reactions    Methotrexate analogues      Elevated Liver Enzyme    Bactrim [sulfamethoxazole-trimethoprim] Nausea And Vomiting and Rash       Review of Systems:     Review of Systems   Constitutional: Positive for fatigue. Negative for appetite change, chills, diaphoresis, fever and unexpected weight change.   HENT:   Negative for hearing loss, mouth sores, nosebleeds, sore throat, trouble swallowing and voice change.    Eyes: Negative for eye problems and icterus.   Respiratory: Negative for chest tightness, cough, hemoptysis, shortness of breath and wheezing.    Cardiovascular: Negative for chest pain, leg swelling and palpitations.   Gastrointestinal: Negative for abdominal distention, abdominal pain, blood in stool, diarrhea, nausea and vomiting.   Endocrine: Negative for hot flashes.   Genitourinary: Negative for bladder incontinence, difficulty urinating, dysuria and hematuria.    Musculoskeletal: Positive for myalgias. Negative for arthralgias, back pain, flank pain, gait problem, neck pain and neck stiffness.   Skin: Negative for itching, rash and wound.   Neurological: Negative for dizziness, extremity weakness, gait problem, headaches, numbness, seizures and speech difficulty.   Hematological: Negative for adenopathy. Does not bruise/bleed easily.   Psychiatric/Behavioral: Negative for confusion, depression and sleep disturbance. The patient is not nervous/anxious.         Physical Exam:   Limited by virtual visit    ECOG Performance Status: (foot note - ECOG PS provided by Eastern Co[employer]cology [employer]) 1 - Symptomatic but completely ambulatory    Karnofsky Performance Score:  80%- Normal Activity with Effort: Some Symptoms of Disease    Labs:   Labs done on 5/12 in  [place] were reviewed.    Imaging: previous imaging has been reviewed    Assessment and Plan:     Mrs. Mac is a pleasant 69 year old female with recently diagnosed AML.      Acute Monocytic Leukemia  -- Admitted from [facility] on 5/25/18 early AM with WBC around 100s, for suspected new non-M3 AML  --lo res HLA typing on 5/26/18 and hi res on 5/27/18 done in anticipation of possible need for future transplant   --Pt with two daughters, two full sisters (in their mid 60s, one with brain aneurysm hx, other one without any medical issues), and one full brother (59 y/o healthy).  --NPM1 +, CEBPA (-), FLT3 (+).  On Midostaurin 50 mg PO BID until Day 14 (held starting 6/8 to 6/11). Stopped when FLAG JENNIFER re-induction started. Restarted Midostaurin at 25 mg bid on day 8 of FLAG JENNIFER induction (6/22), increase to full dose 50 mg bid (6/26) as improvement in liver enzymes. Stopped 7/3/18 due to abnormal cardiac echo.  --day 14 bone marrow biopsy completed 6/11 showing persistent disease with 15% CD 34 positive blasts  --Day 60 of FLAG-JENNIFER, tolerating without difficulty except nausea/vomiting  --day 14 restaging bone marrow biopsy done 6/29 without complication, results with no evidence of AML, FLT 3 negative, will need repeat marrow at count recovery outpatient   --recovery marrow showed no evidence of disease  --HiDAC #1 on 8/14/18, HiDAC#2 on 9/11/18  --Cycle 3 delayed 1 week due to pancytopenia. Started on 10/16/18  --recovery bone marrow showed no signs of residual leukemia  --Bone marrow biopsy done in May 2019 shows no overt signs of recurrent disease    Left sided jaw dysfunction  --MRI revealed degenerative changes  --Seen by ENT    Thrombocytopenia  --Previously in the 70-90 range  --115 today    Suspected Leukemia Cutis  --pathology from dermatology confirmed inflammatory changes  --now resolved with topical steroid cream    Abnormal Liver Function Tests  --ALT and AST and Alk phos have again increased  substantially since re-starting midostaurin.   --midostaurin started day 8 at 25 mg bid, monitoring liver enzymes closely and improved. Increasing Midostaurin to 50 mg bid 6/26. Stopped Midostaurin (7/3) due to new diagnosis of systolic heart failure EF 35%.  --6/22/18 liver US showing mild intrahepatic dilation, otherwise impression of hepatic steatosis.  Will hold off of MRCP at this time.    --permanently discontinued midostaurin     Chemotherapy induced cardiomyopathy  --cardiology follow-up as outpatient in 3 months  --repeat 2D echo on 6/15 with preserved EF of 60%.  However 7/2/18 echo with reduced EF 30-35%  --Echo on 8/15/18 was within normal range EF of 60-65%    Rheumatologic issues  --Previously on steroids and methotrexate  --working with [facility] rheumatology    30 minutes were spent face to face with the patient and her family to discuss the disease, natural history, treatment options and survival statistics. I have provided the patient with an opportunity to ask questions and have all questions answered to her satisfaction.     she will return to the clinic in 4 weeks, but knows to call in the interim if symptoms change or should a problem arise.        Laquita Troy MD  Hematology and Medical Oncology  Bone Marrow Transplant  [facility]

## 2020-05-22 DIAGNOSIS — I10 HYPERTENSION, UNSPECIFIED TYPE: ICD-10-CM

## 2020-05-22 RX ORDER — METOPROLOL SUCCINATE 25 MG/1
TABLET, EXTENDED RELEASE ORAL
Qty: 30 TABLET | Refills: 11 | Status: SHIPPED | OUTPATIENT
Start: 2020-05-22 | End: 2021-06-10 | Stop reason: SDUPTHER

## 2020-06-16 NOTE — ASSESSMENT & PLAN NOTE
- Admitted from [facility] on 5/25/18 early AM with WBC around 100s, for suspected new non-M3 AML  - Lo res HLA typing on 5/26/18 and hi res on 5/27/18 done in anticipation of possible need for future transplant   - Pt with two daughters, two full sisters (in their mid 60s, one with brain aneurysm hx, other one without any medical issues), and one full brother (61 y/o healthy).  - NPM1 +, CEBPA (-), FLT3 (+).  On Midostaurin 50 mg PO BID until Day 14 (held starting 6/8 to 6/11). Stopped when FLAG JENNIFER re-induction started. Restarted Midostaurin at 25 mg bid on day 8 of FLAG JENNIFER induction (6/22), increased to full dose 50 mg bid (6/26) as improvement in liver enzymes. Stopped 7/3/18 due to abnormal cardiac echo.     - Received 7+3, day 14 bone marrow biopsy completed 6/11 showing persistent disease with 15% CD 34 positive blasts; was reinduced with FLAG-JENNIFER, day 14 restaging bone marrow biopsy done 6/29 without complication, results with no evidence of AML, FLT 3 negative  - Repeat marrow at count recovery 7/31 showed no evidence of AML  - Derm bx of right palm negative for leukemia cutis and sweets  - Admitted on 8/14/18 for planned cycle 1 hidac  - Admitted on 9/11/18 for planned cycle 2 hidac   - f/u uric acid   Admitted

## 2020-06-29 ENCOUNTER — TELEPHONE (OUTPATIENT)
Dept: HEMATOLOGY/ONCOLOGY | Facility: CLINIC | Age: 70
End: 2020-06-29

## 2020-06-29 NOTE — TELEPHONE ENCOUNTER
"----- Message from Pat Small sent at 6/29/2020 10:00 AM CDT -----  Patient Assist    Name of caller:  spouse   Provider name: Sydni Coelho MD   Contact Preference:  [phone]  Is this regarding current patient or new patient?: current   What is the nature of the call?    - calling to schedule a virtual visit   Additional Notes:   "Thank you for all that you do for our patients'"          "

## 2020-06-30 ENCOUNTER — TELEPHONE (OUTPATIENT)
Dept: HEMATOLOGY/ONCOLOGY | Facility: CLINIC | Age: 70
End: 2020-06-30

## 2020-07-02 ENCOUNTER — OFFICE VISIT (OUTPATIENT)
Dept: HEMATOLOGY/ONCOLOGY | Facility: CLINIC | Age: 70
End: 2020-07-02
Payer: MEDICARE

## 2020-07-02 DIAGNOSIS — G47.00 INSOMNIA, UNSPECIFIED TYPE: ICD-10-CM

## 2020-07-02 DIAGNOSIS — D61.810 PANCYTOPENIA DUE TO CHEMOTHERAPY: ICD-10-CM

## 2020-07-02 DIAGNOSIS — C92.00 ACUTE MYELOID LEUKEMIA NOT HAVING ACHIEVED REMISSION: Primary | ICD-10-CM

## 2020-07-02 DIAGNOSIS — T45.1X5A CINV (CHEMOTHERAPY-INDUCED NAUSEA AND VOMITING): ICD-10-CM

## 2020-07-02 DIAGNOSIS — R79.89 ELEVATED LFTS: ICD-10-CM

## 2020-07-02 DIAGNOSIS — R11.2 CINV (CHEMOTHERAPY-INDUCED NAUSEA AND VOMITING): ICD-10-CM

## 2020-07-02 PROCEDURE — 99215 OFFICE O/P EST HI 40 MIN: CPT | Mod: 95,,, | Performed by: INTERNAL MEDICINE

## 2020-07-02 PROCEDURE — 99215 PR OFFICE/OUTPT VISIT, EST, LEVL V, 40-54 MIN: ICD-10-PCS | Mod: 95,,, | Performed by: INTERNAL MEDICINE

## 2020-07-02 NOTE — PROGRESS NOTES
Hematology and Medical Oncology   Follow Up Note -- Virtual Visit     07/02/2020    Primary Oncologic Diagnosis: AML, FLT 3 + and NPMN1+.     The patient location is: home  The chief complaint leading to consultation is: AML follow up  Visit type: Virtual visit with synchronous audio and video  Total time spent with patient: 30  Each patient to whom he or she provides medical services by telemedicine is:  (1) informed of the relationship between the physician and patient and the respective role of any other health care provider with respect to management of the patient; and (2) notified that he or she may decline to receive medical services by telemedicine and may withdraw from such care at any time.      History of Present Ilness:   Sabrina Badillo) is a pleasant 70 y.o.female presents to clinic following 2 induction therapies for AML. She was in the hospital roughly 50 days to receive 7+3 and then FLAG JENNIFER.    Oncology History:   68 y.o. female admitted overnight for possible new AML. Came in from OSH with elevated WBC of 100.   05/25/2018: Pt is now on hydrea 2 grams BID, allopurinol 300 mg daily, and IVF. Pt may need rasburicase this weekend. She will undergo a BM bx and aspiration today. She is an induction candidate and will not be screened for research trials. She has anxiety for which she usually takes xanax, this was re-started.   05/26/2018 PICC placed. Reports she slept well last night. Anxiety improved with prn xanax. EF 65%, HIV and Hep panel negative. 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  6/12/2018: Day 15 of 7+3 induction, restaging BM bx done yesterday. On Midostaurin. Fever yesterday and BC with gram + cocci in clusters. On cefepime day 2 and will start Vanc today. Labial mucositis improving.   6/13/2018: Day 16 7+3. BC with staph aureus, identification pending. Surveillance blood cultures done today. Afebrile x 48 hours. On cefepime and Vanc. Labial mucositis resolved. No complaints of  pain. Nausea controlled. No diarrhea. Primary complaint is fatigue.   6/14/20018: Day 17 of 7+3. Day 14 bone marrow results pending. BC with staph epidermis only sensitive to Vancomycin. Vanc day 3 and cefepime day 4. Vanc trough 12.2 last night. BP remains low but stable. Afebrile x 72 hours.   6/15/2018: Day 18 of 7+3. Day 14 bone marrow biopsy shows persistent disease with 15% blasts. Discussion with patient regarding treatment and she is deciding if she wants to proceed with re-induction vs outpatient maintenance. Temp of 100.4 overnight, unsustained. Day 5 Cefepime and Day 4 of Vanc for staph epidermis. Repeat cultures NGTD. BP improved. Electrolytes (mag, phos, K and calcium) replaced aggressively this am and will repeat labs this afternoon. No complaints this am.   06/16/2018: Day 19 of 7+3. Day 2 of Flag-JENNIFER. Remains afebrile. Calcium remains low and have increased PO supplementation. Remains on vanc and aztreonam. Somewhat loopy feeling after receiving benadryl.   06/17/2018: Day 20 of 7+3. Day 3 of FLAG-JENNIFER. Multiple electrolyte abnormalities. New bilateral leg and feet swelling.   6/18/2018: Day 21 of 7+3 and Day 4 of FLAG JENNIFER. Tolerating well with some nausea controlled with Zofran. VSS, afebrile. ANC 1900 on Neupogen. Continues Vanc for staph epi bacteremia. Multiple electrolytes replaced today. Complains of edema to lower extremities, not improved after 20 of lasix yesterday. No sob or CP.   6/19/18: Day 22 of 7+3 and Day 5 of FLAG JENNIFER. VSS, afebrile, ANC 3900. Vanc trough elevated and dose adjusted this am. Lower extremity edema improved after 40 of lasix yesterday, net negative 300 cc I&O. Mild nausea, no abdominal pain or diarrhea. Poor appetite but no difficulty eating or taking pills.   6/20/18: Day 23 of 7+3 and Day 6 of FLAG JENNIFER. Patient complains of nausea and vomiting overnight and this am, especially when taking pills. Will add Zyprexa daily in addition to Zofran, compazine, and ativan PRN  and will change meds to IV. 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 Afebrile.  No diarrhea, mouth sores or pain.  06/21/2018: Day 24 of 7+3 and Day 7 of FLAG JENNIFER. Nausea better controlled. Continued on Vanc.  6/22/2018: Day 25 of 7+3 and Day 8 of FLAG JENNIFER. Nausea improved some with Zyprexa but did have 1 episode of emesis overnight.continuing meds IV due to vomiting. Remains afebrile. ANC 0. Continues Vanc and Flagyl. Electrolytes replaced.   06/23/2018 : day 26 of 7+3 and Day 9 of FLAG JENNIFER.  Nausea persists, worsened after taking pills so meds converted to IV.  Encouraged ambulation.  Continue with flagyl for leptotrichia goodfellowii.  RUQ US showed intrahepatic dilation, overall impression hepatic steatosis.  CBD 0.5cm.  No Gallbladder.    06/24/2018 : day 27 of 7+3 and Day 10 of FLAG JENNIFER.  Nausea persisting, able to tolerate some po pills.  Last day of flagyl (day 5).  Still pancytopenic. Appetite still low as po intake triggers nausea.  06/25/2018: day 28 of 7+3 and Day 11 of FLAG JENNIFER. Afebrile, ANC 0. Has completed Flagyl. Will decrease Vanc to 1000 mg q12, trough 19.9, will complete Vanc on 6/27. Liver enzymes stable on Midostaurin. VSS.   6/26/2018: day 29 of 7+3 and day 12 of FLAG JENNIFER. Liver enzymes improved and Midostaurin increased to full dose 50 mg bid. Nausea controlled, afebrile, VSS, NEON.  6/27/2018: day 30 of 7+3 and Day 13 of FLAG JENNIFER. Persistent nausea and emesis x 1 yesterday. Compazine changed to scheduled. Vanc ends today. Afebrile, VSS. Aggressive electrolyte replacement, low electrolytes possibly due to Midostaurin.   6/28/2018: day 31 of 7+3 and Day 14 of FLAG JENNIFER. Nausea improved with scheduled Compazine. Patient has agreed to start converting some IV meds to po. VSS, afebrile, electrolytes wnl today. Only complains of fatigue, new rash noted to upper back and chest and legs, papular rash mildly red.  6/29/2018: Day  32 of 7+3 and Day 15 of FLAG JENNIFER. Day 14 restaging bone marrow biopsy done at bedside today. Patient states nausea improved but she did have emesis last night after taking 9 pm pills. Rash improved. No mouth sores, diarrhea or pain.   7/2/2018: Day 35 of 7+3 and Day 18 of FLAG JENNIFER. Restaging BM bx results pending. Fluid overload over the weekend. Chest x-ray with pulmonary edema. Os sats down to 88%. Placed on O2 at 2 L NC, off O2 this am. Received lasix. Net negative 2.7 L today. 1 episode of nausea, no emesis. Reports anxiety relieved with PRN meds. Electrolytes improved, afebrile, VSS.   7/3/2018: Day 36 of 7+3 and Day 19 of FLAG JENNIFER. Restaging Bm bx with GABRIEL. Cardiology consulted for abnormal echo, EF 30% with pulmonary HTN and severe L atrial enlargement. EKG with T wave abnormality, possible anterolateral ischemia and prolonged QTc of 530. Medications adjusted. Nausea controlled, no diarrhea. Electrolytes improved. On O2 as needed.   07/04/2018 : Day 37 of 7+3 and Day 20 of FLAG JENNIFER Diarrhea in AM, watery, no associated abdominal pain, nausea, or vomiting.    07/05/2018: Day 38 of 7+3 ane Day 21 of FLAG JENNIFER. No CP or SOB, cardiology following. On RA. All meds changed to po, denies nausea or vomiting and no diarrhea. VSS, afebrile.   7/6/2018: Day 22 of FLAG JENNIFER. Continues to tolerate po meds with no nausea. Afebrile. Awaiting count recovery for discharge.  7/7/2018-/8/2018: Day 40/41 of 7+3, Day 23/24 of FLAG JENNIFER.  Improving clinically.  Started on mag oxide per patient request.  Labs showing signs of ANC recovery.  7/9/2018: Day 25 FLAG JENNIFER, , continues Neupogen. Received platelets today. Afebrile, VSS. Tolerating all oral meds with no nausea or vomiting. Only complains of fatigue.   7/10/2018: Day 26 FLAG JENNIFER,  today. Had 2 episodes of vomiting and diarrhea last night. Feeling better. Afebrile, VSS.   7/11/2018: Day 27 FLAG JENNIFER,  today. No vomiting or diarrhea overnight and no nausea.  Afebrile, VSS. Complains of back pain (bone pain) suspect due to count recovery. Relieved with oxy IR and Zyrtec started.  07/12/2018: Day 28 flag jennifer, engrafted today with ANC of 730. Back pain improved with zyrtec. Scheduled for IT chemo tomorrow at 1:30 pm and discharge home thereafter. Will likely need plt transfusion prior to IT chemo tomorrow   7/13/2018: Day 29 of FLAG JENNIFER. ANC up to 1300 today. Transfusing platelets today prior to LP for IT chemo. Patient continues to complain of bone pain, mostly to back and legs. No nausea, diarrhea, sob. Afebrile and VSS.   --hospitalized 7/23-7/25 for C.diff diarrhea, neutropenic fever.  While inpatient she developed pink blisters on her palm that were concerning for relapse.  --palm biopsy on 7/26/18 for suspicion of leukemia cutis was also negative for sign of relapse  --bone marrow biopsy 7/31/18 was negative for signs of relapse   --Admitted on 8/14/18 for HiDAC#1  --Admitted on 9/11/18 for HiDAC#2  --Admitted on 10/16/18 for HiDAC#3  --Post consolidation bone marrow biopsy on 12/27/2018: AML FISH (BM) Interpretation: The result is within normal limits for the AML FISH panel. No evidence of residual disease.  --Bone Marrow Biopsy on 5/9/19 NO MORPHOLOGIC EVIDENCE OF RESIDUAL ACUTE MYELOID LEUKEMIA. RECOMMEND NGS STUDY TO RULE OUT RESIDUAL DISEASE. deletion 20q    Interval History:   Mrs. Wilkes continues to do quite well. We were able to see each other well through facetime without issue. She has been staying home and her daughter and grandkids came to visit after a 2 week self quarantine. She has hugely enjoyed their visit. She currently has no issues or complaints. Denies fevers/chills, night sweats, lymphadenopathy, bleeding/bruising.    Past Medical History:   Past Medical History:   Diagnosis Date    Cancer 03/2018    AML    CHF (congestive heart failure)     Diarrhea 9/14/2018    Encounter for blood transfusion        Current Medications:   Current  Outpatient Medications   Medication Sig    ALPRAZolam (XANAX) 0.5 MG tablet Take 0.5 tablets (0.25 mg total) by mouth 3 (three) times daily as needed for Insomnia or Anxiety.    ALPRAZolam (XANAX) 1 MG tablet Take 0.5 tablets (0.5 mg total) by mouth nightly as needed for Insomnia.    clotrimazole (MYCELEX) 10 mg sultana     diphenhydrAMINE (BENADRYL) 50 MG capsule Take 50 mg by mouth nightly as needed for Itching or Insomnia.    famotidine (PEPCID) 20 MG tablet     famotidine (PEPCID) 40 MG tablet Take 1 tablet (40 mg total) by mouth nightly.    ibuprofen (ADVIL,MOTRIN) 600 MG tablet Take 1 tablet (600 mg total) by mouth every 6 (six) hours as needed (left ear/jaw pain).    LORazepam (ATIVAN) 0.5 MG tablet Take 1 tablet (0.5 mg total) by mouth every 6 (six) hours as needed (nausea).    metoprolol succinate (TOPROL-XL) 25 MG 24 hr tablet TAKE ONE TABLET BY MOUTH ONCE DAILY    ofloxacin (FLOXIN) 0.3 % otic solution Place 5 drops into the left ear once daily.    omeprazole (PRILOSEC OTC) 20 MG tablet Take 20 mg by mouth every morning.    omeprazole (PRILOSEC) 40 MG capsule Take 1 capsule (40 mg total) by mouth before breakfast. Take on empty stomach 30-60 minutes before meal    ondansetron (ZOFRAN) 8 MG tablet     oxyCODONE-acetaminophen (PERCOCET) 7.5-325 mg per tablet Take 1 tablet by mouth every 4 (four) hours as needed for Pain.    predniSONE (DELTASONE) 10 MG tablet TAKE ONE TABLET BY MOUTH ONCE DAILY AS NEEDED FOR ARTHRITIS FLARE    predniSONE (DELTASONE) 10 MG tablet 5 mg.    predniSONE (DELTASONE) 2.5 MG tablet Take prednisone 3 pills daily for 2 weeks and then 2 tablets daily for 2 weeks and the one tablet daily    traMADol (ULTRAM) 50 mg tablet Take 1 tablet (50 mg total) by mouth every 6 (six) hours as needed for Pain.    walker (ULTRA-LIGHT ROLLATOR) Misc 1 Units by Misc.(Non-Drug; Combo Route) route once daily.     No current facility-administered medications for this visit.       ALLERGIES:   Review of patient's allergies indicates:   Allergen Reactions    Methotrexate analogues      Elevated Liver Enzyme    Bactrim [sulfamethoxazole-trimethoprim] Nausea And Vomiting and Rash       Review of Systems:     Review of Systems   Constitutional: Positive for fatigue. Negative for appetite change, chills, diaphoresis, fever and unexpected weight change.   HENT:   Negative for hearing loss, mouth sores, nosebleeds, sore throat, trouble swallowing and voice change.    Eyes: Negative for eye problems and icterus.   Respiratory: Negative for chest tightness, cough, hemoptysis, shortness of breath and wheezing.    Cardiovascular: Negative for chest pain, leg swelling and palpitations.   Gastrointestinal: Negative for abdominal distention, abdominal pain, blood in stool, diarrhea, nausea and vomiting.   Endocrine: Negative for hot flashes.   Genitourinary: Negative for bladder incontinence, difficulty urinating, dysuria and hematuria.    Musculoskeletal: Positive for myalgias. Negative for arthralgias, back pain, flank pain, gait problem, neck pain and neck stiffness.   Skin: Negative for itching, rash and wound.   Neurological: Negative for dizziness, extremity weakness, gait problem, headaches, numbness, seizures and speech difficulty.   Hematological: Negative for adenopathy. Does not bruise/bleed easily.   Psychiatric/Behavioral: Negative for confusion, depression and sleep disturbance. The patient is not nervous/anxious.         Physical Exam:   Limited by virtual visit    ECOG Performance Status: (foot note - ECOG PS provided by Eastern Cooperative Oncology Group) 1 - Symptomatic but completely ambulatory    Karnofsky Performance Score:  80%- Normal Activity with Effort: Some Symptoms of Disease    Labs:   Labs done last week in [place] were reviewed and within normal limits.    Imaging: previous imaging has been reviewed    Assessment and Plan:     Mrs. Mac is a pleasant 70 year old  female with recently diagnosed AML.      Acute Monocytic Leukemia  -- Admitted from [facility] on [facility]18 early AM with WBC around 100s, for suspected new non-M3 AML  --lo res HLA typing on 5/26/18 and hi res on 5/27/18 done in anticipation of possible need for future transplant   --Pt with two daughters, two full sisters (in their mid 60s, one with brain aneurysm hx, other one without any medical issues), and one full brother (61 y/o healthy).  --NPM1 +, CEBPA (-), FLT3 (+).  On Midostaurin 50 mg PO BID until Day 14 (held starting 6/8 to 6/11). Stopped when FLAG JENNIFER re-induction started. Restarted Midostaurin at 25 mg bid on day 8 of FLAG JENNIFER induction (6/22), increase to full dose 50 mg bid (6/26) as improvement in liver enzymes. Stopped 7/3/18 due to abnormal cardiac echo.  --day 14 bone marrow biopsy completed 6/11 showing persistent disease with 15% CD 34 positive blasts  --Day 60 of FLAG-JENNIFER, tolerating without difficulty except nausea/vomiting  --day 14 restaging bone marrow biopsy done 6/29 without complication, results with no evidence of AML, FLT 3 negative, will need repeat marrow at count recovery outpatient   --recovery marrow showed no evidence of disease  --HiDAC #1 on 8/14/18, HiDAC#2 on 9/11/18  --Cycle 3 delayed 1 week due to pancytopenia. Started on 10/16/18  --recovery bone marrow showed no signs of residual leukemia  --Bone marrow biopsy done in May 2019 shows no overt signs of recurrent disease    Left sided jaw dysfunction  --MRI revealed degenerative changes  --Seen by ENT    Thrombocytopenia  --Previously in the 70-90 range  --nor stable over 100k    Suspected Leukemia Cutis  --pathology from dermatology confirmed inflammatory changes  --now resolved with topical steroid cream    Abnormal Liver Function Tests  --ALT and AST and Alk phos have again increased substantially since re-starting midostaurin.   --midostaurin started day 8 at 25 mg bid, monitoring liver enzymes closely and  improved. Increasing Midostaurin to 50 mg bid 6/26. Stopped Midostaurin (7/3) due to new diagnosis of systolic heart failure EF 35%.  --6/22/18 liver US showing mild intrahepatic dilation, otherwise impression of hepatic steatosis.  Will hold off of MRCP at this time.    --permanently discontinued midostaurin     Chemotherapy induced cardiomyopathy  --cardiology follow-up as outpatient in 3 months  --repeat 2D echo on 6/15 with preserved EF of 60%.  However 7/2/18 echo with reduced EF 30-35%  --Echo on 8/15/18 was within normal range EF of 60-65%    Rheumatologic issues  --Previously on steroids and methotrexate  [place] [facility] [place]    30 minutes were spent face to face with the patient and her family to discuss the disease, natural history, treatment options and survival statistics. I have provided the patient with an opportunity to ask questions and have all questions answered to her satisfaction.     she will return to the clinic in 4 weeks, but knows to call in the interim if symptoms change or should a problem arise.        Laquita Troy MD  Hematology and Medical Oncology  Bone Marrow Transplant  [facility]

## 2020-07-28 ENCOUNTER — TELEPHONE (OUTPATIENT)
Dept: HEMATOLOGY/ONCOLOGY | Facility: CLINIC | Age: 70
End: 2020-07-28

## 2020-07-28 NOTE — TELEPHONE ENCOUNTER
"----- Message from Tamiko Dozier sent at 7/28/2020 10:25 AM CDT -----  Consult/Advisory:    Name Of Caller: Jesi Mac   Contact Preference?: [phone]    Provider Name: Dr Troy     What is the nature of the call?:  Pt's  request a callback concern wife did not have an appointment in July aware next appointment 8/10/20. Blood work was completed on 7/21. Please contact to discuss     Additional Notes:  "Thank you for all that you do for our patients'"           "

## 2020-07-29 ENCOUNTER — OFFICE VISIT (OUTPATIENT)
Dept: HEMATOLOGY/ONCOLOGY | Facility: CLINIC | Age: 70
End: 2020-07-29
Payer: MEDICARE

## 2020-07-29 DIAGNOSIS — T45.1X5A CINV (CHEMOTHERAPY-INDUCED NAUSEA AND VOMITING): ICD-10-CM

## 2020-07-29 DIAGNOSIS — R11.2 CINV (CHEMOTHERAPY-INDUCED NAUSEA AND VOMITING): ICD-10-CM

## 2020-07-29 DIAGNOSIS — M35.1 MCTD (MIXED CONNECTIVE TISSUE DISEASE): ICD-10-CM

## 2020-07-29 DIAGNOSIS — F41.9 ANXIETY: Primary | ICD-10-CM

## 2020-07-29 DIAGNOSIS — C93.01 ACUTE MONOCYTIC LEUKEMIA IN REMISSION: ICD-10-CM

## 2020-07-29 DIAGNOSIS — R79.89 ELEVATED LFTS: ICD-10-CM

## 2020-07-29 PROCEDURE — 99215 OFFICE O/P EST HI 40 MIN: CPT | Mod: 95,,, | Performed by: INTERNAL MEDICINE

## 2020-07-29 PROCEDURE — 99215 PR OFFICE/OUTPT VISIT, EST, LEVL V, 40-54 MIN: ICD-10-PCS | Mod: 95,,, | Performed by: INTERNAL MEDICINE

## 2020-07-29 RX ORDER — ALPRAZOLAM 0.5 MG/1
0.5 TABLET ORAL NIGHTLY PRN
Qty: 90 TABLET | Refills: 0 | Status: ON HOLD | OUTPATIENT
Start: 2020-07-29 | End: 2020-10-28 | Stop reason: HOSPADM

## 2020-07-29 NOTE — PROGRESS NOTES
Hematology and Medical Oncology   Follow Up Note -- Virtual Visit     07/27/2020    Primary Oncologic Diagnosis: AML, FLT 3 + and NPMN1+.     The patient location is: home  The chief complaint leading to consultation is: AML follow up  Visit type: Virtual visit with synchronous audio and video  Total time spent with patient: 30  Each patient to whom he or she provides medical services by telemedicine is:  (1) informed of the relationship between the physician and patient and the respective role of any other health care provider with respect to management of the patient; and (2) notified that he or she may decline to receive medical services by telemedicine and may withdraw from such care at any time.      History of Present Ilness:   Sabrina Badillo) is a pleasant 70 y.o.female presents to clinic following 2 induction therapies for AML. She was in the hospital roughly 50 days to receive 7+3 and then FLAG JENNIFER.    Oncology History:   68 y.o. female admitted overnight for possible new AML. Came in from OSH with elevated WBC of 100.   05/25/2018: Pt is now on hydrea 2 grams BID, allopurinol 300 mg daily, and IVF. Pt may need rasburicase this weekend. She will undergo a BM bx and aspiration today. She is an induction candidate and will not be screened for research trials. She has anxiety for which she usually takes xanax, this was re-started.   05/26/2018 PICC placed. Reports she slept well last night. Anxiety improved with prn xanax. EF 65%, HIV and Hep panel negative. 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  6/12/2018: Day 15 of 7+3 induction, restaging BM bx done yesterday. On Midostaurin. Fever yesterday and BC with gram + cocci in clusters. On cefepime day 2 and will start Vanc today. Labial mucositis improving.   6/13/2018: Day 16 7+3. BC with staph aureus, identification pending. Surveillance blood cultures done today. Afebrile x 48 hours. On cefepime and Vanc. Labial mucositis resolved. No complaints of  pain. Nausea controlled. No diarrhea. Primary complaint is fatigue.   6/14/20018: Day 17 of 7+3. Day 14 bone marrow results pending. BC with staph epidermis only sensitive to Vancomycin. Vanc day 3 and cefepime day 4. Vanc trough 12.2 last night. BP remains low but stable. Afebrile x 72 hours.   6/15/2018: Day 18 of 7+3. Day 14 bone marrow biopsy shows persistent disease with 15% blasts. Discussion with patient regarding treatment and she is deciding if she wants to proceed with re-induction vs outpatient maintenance. Temp of 100.4 overnight, unsustained. Day 5 Cefepime and Day 4 of Vanc for staph epidermis. Repeat cultures NGTD. BP improved. Electrolytes (mag, phos, K and calcium) replaced aggressively this am and will repeat labs this afternoon. No complaints this am.   06/16/2018: Day 19 of 7+3. Day 2 of Flag-JENNIFER. Remains afebrile. Calcium remains low and have increased PO supplementation. Remains on vanc and aztreonam. Somewhat loopy feeling after receiving benadryl.   06/17/2018: Day 20 of 7+3. Day 3 of FLAG-JENNIFER. Multiple electrolyte abnormalities. New bilateral leg and feet swelling.   6/18/2018: Day 21 of 7+3 and Day 4 of FLAG JENNIFER. Tolerating well with some nausea controlled with Zofran. VSS, afebrile. ANC 1900 on Neupogen. Continues Vanc for staph epi bacteremia. Multiple electrolytes replaced today. Complains of edema to lower extremities, not improved after 20 of lasix yesterday. No sob or CP.   6/19/18: Day 22 of 7+3 and Day 5 of FLAG JENNIFER. VSS, afebrile, ANC 3900. Vanc trough elevated and dose adjusted this am. Lower extremity edema improved after 40 of lasix yesterday, net negative 300 cc I&O. Mild nausea, no abdominal pain or diarrhea. Poor appetite but no difficulty eating or taking pills.   6/20/18: Day 23 of 7+3 and Day 6 of FLAG JENNIEFR. Patient complains of nausea and vomiting overnight and this am, especially when taking pills. Will add Zyprexa daily in addition to Zofran, compazine, and ativan PRN  and will change meds to IV. 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 Afebrile.  No diarrhea, mouth sores or pain.  06/21/2018: Day 24 of 7+3 and Day 7 of FLAG JENNIFER. Nausea better controlled. Continued on Vanc.  6/22/2018: Day 25 of 7+3 and Day 8 of FLAG JENNIFER. Nausea improved some with Zyprexa but did have 1 episode of emesis overnight.continuing meds IV due to vomiting. Remains afebrile. ANC 0. Continues Vanc and Flagyl. Electrolytes replaced.   06/23/2018 : day 26 of 7+3 and Day 9 of FLAG JENNIFER.  Nausea persists, worsened after taking pills so meds converted to IV.  Encouraged ambulation.  Continue with flagyl for leptotrichia goodfellowii.  RUQ US showed intrahepatic dilation, overall impression hepatic steatosis.  CBD 0.5cm.  No Gallbladder.    06/24/2018 : day 27 of 7+3 and Day 10 of FLAG JENNIFER.  Nausea persisting, able to tolerate some po pills.  Last day of flagyl (day 5).  Still pancytopenic. Appetite still low as po intake triggers nausea.  06/25/2018: day 28 of 7+3 and Day 11 of FLAG JENNIFER. Afebrile, ANC 0. Has completed Flagyl. Will decrease Vanc to 1000 mg q12, trough 19.9, will complete Vanc on 6/27. Liver enzymes stable on Midostaurin. VSS.   6/26/2018: day 29 of 7+3 and day 12 of FLAG JENNIFER. Liver enzymes improved and Midostaurin increased to full dose 50 mg bid. Nausea controlled, afebrile, VSS, NEON.  6/27/2018: day 30 of 7+3 and Day 13 of FLAG JENNIFER. Persistent nausea and emesis x 1 yesterday. Compazine changed to scheduled. Vanc ends today. Afebrile, VSS. Aggressive electrolyte replacement, low electrolytes possibly due to Midostaurin.   6/28/2018: day 31 of 7+3 and Day 14 of FLAG JENNIFER. Nausea improved with scheduled Compazine. Patient has agreed to start converting some IV meds to po. VSS, afebrile, electrolytes wnl today. Only complains of fatigue, new rash noted to upper back and chest and legs, papular rash mildly red.  6/29/2018: Day  32 of 7+3 and Day 15 of FLAG JENNIFER. Day 14 restaging bone marrow biopsy done at bedside today. Patient states nausea improved but she did have emesis last night after taking 9 pm pills. Rash improved. No mouth sores, diarrhea or pain.   7/2/2018: Day 35 of 7+3 and Day 18 of FLAG JENNIFER. Restaging BM bx results pending. Fluid overload over the weekend. Chest x-ray with pulmonary edema. Os sats down to 88%. Placed on O2 at 2 L NC, off O2 this am. Received lasix. Net negative 2.7 L today. 1 episode of nausea, no emesis. Reports anxiety relieved with PRN meds. Electrolytes improved, afebrile, VSS.   7/3/2018: Day 36 of 7+3 and Day 19 of FLAG JENNIFER. Restaging Bm bx with GABRIEL. Cardiology consulted for abnormal echo, EF 30% with pulmonary HTN and severe L atrial enlargement. EKG with T wave abnormality, possible anterolateral ischemia and prolonged QTc of 530. Medications adjusted. Nausea controlled, no diarrhea. Electrolytes improved. On O2 as needed.   07/04/2018 : Day 37 of 7+3 and Day 20 of FLAG JENNIFER Diarrhea in AM, watery, no associated abdominal pain, nausea, or vomiting.    07/05/2018: Day 38 of 7+3 ane Day 21 of FLAG JENNIFER. No CP or SOB, cardiology following. On RA. All meds changed to po, denies nausea or vomiting and no diarrhea. VSS, afebrile.   7/6/2018: Day 22 of FLAG JENNIFER. Continues to tolerate po meds with no nausea. Afebrile. Awaiting count recovery for discharge.  7/7/2018-/8/2018: Day 40/41 of 7+3, Day 23/24 of FLAG JENNIFER.  Improving clinically.  Started on mag oxide per patient request.  Labs showing signs of ANC recovery.  7/9/2018: Day 25 FLAG JENNIFER, , continues Neupogen. Received platelets today. Afebrile, VSS. Tolerating all oral meds with no nausea or vomiting. Only complains of fatigue.   7/10/2018: Day 26 FLAG JENNIFER,  today. Had 2 episodes of vomiting and diarrhea last night. Feeling better. Afebrile, VSS.   7/11/2018: Day 27 FLAG JENNIFER,  today. No vomiting or diarrhea overnight and no nausea.  Afebrile, VSS. Complains of back pain (bone pain) suspect due to count recovery. Relieved with oxy IR and Zyrtec started.  07/12/2018: Day 28 flag jennifer, engrafted today with ANC of 730. Back pain improved with zyrtec. Scheduled for IT chemo tomorrow at 1:30 pm and discharge home thereafter. Will likely need plt transfusion prior to IT chemo tomorrow   7/13/2018: Day 29 of FLAG JENNIFER. ANC up to 1300 today. Transfusing platelets today prior to LP for IT chemo. Patient continues to complain of bone pain, mostly to back and legs. No nausea, diarrhea, sob. Afebrile and VSS.   --hospitalized 7/23-7/25 for C.diff diarrhea, neutropenic fever.  While inpatient she developed pink blisters on her palm that were concerning for relapse.  --palm biopsy on 7/26/18 for suspicion of leukemia cutis was also negative for sign of relapse  --bone marrow biopsy 7/31/18 was negative for signs of relapse   --Admitted on 8/14/18 for HiDAC#1  --Admitted on 9/11/18 for HiDAC#2  --Admitted on 10/16/18 for HiDAC#3  --Post consolidation bone marrow biopsy on 12/27/2018: AML FISH (BM) Interpretation: The result is within normal limits for the AML FISH panel. No evidence of residual disease.  --Bone Marrow Biopsy on 5/9/19 NO MORPHOLOGIC EVIDENCE OF RESIDUAL ACUTE MYELOID LEUKEMIA. RECOMMEND NGS STUDY TO RULE OUT RESIDUAL DISEASE. deletion 20q    Interval History:   Mrs. Wilkes continues to do quite well. She recently had a UTI, that required abx. We were able to see each other well through facetime without issue. Her brother recently had COVID and is still suffering with the side effects. She currently has no issues or complaints. Denies fevers/chills, night sweats, lymphadenopathy, bleeding/bruising.    Past Medical History:   Past Medical History:   Diagnosis Date    Cancer 03/2018    AML    CHF (congestive heart failure)     Diarrhea 9/14/2018    Encounter for blood transfusion        Current Medications:   Current Outpatient Medications    Medication Sig    ALPRAZolam (XANAX) 1 MG tablet Take 0.5 tablets (0.5 mg total) by mouth nightly as needed for Insomnia.    clotrimazole (MYCELEX) 10 mg sultana     diphenhydrAMINE (BENADRYL) 50 MG capsule Take 50 mg by mouth nightly as needed for Itching or Insomnia.    famotidine (PEPCID) 20 MG tablet     famotidine (PEPCID) 40 MG tablet Take 1 tablet (40 mg total) by mouth nightly.    ibuprofen (ADVIL,MOTRIN) 600 MG tablet Take 1 tablet (600 mg total) by mouth every 6 (six) hours as needed (left ear/jaw pain).    LORazepam (ATIVAN) 0.5 MG tablet Take 1 tablet (0.5 mg total) by mouth every 6 (six) hours as needed (nausea).    metoprolol succinate (TOPROL-XL) 25 MG 24 hr tablet TAKE ONE TABLET BY MOUTH ONCE DAILY    ofloxacin (FLOXIN) 0.3 % otic solution Place 5 drops into the left ear once daily.    omeprazole (PRILOSEC OTC) 20 MG tablet Take 20 mg by mouth every morning.    omeprazole (PRILOSEC) 40 MG capsule Take 1 capsule (40 mg total) by mouth before breakfast. Take on empty stomach 30-60 minutes before meal    ondansetron (ZOFRAN) 8 MG tablet     oxyCODONE-acetaminophen (PERCOCET) 7.5-325 mg per tablet Take 1 tablet by mouth every 4 (four) hours as needed for Pain.    predniSONE (DELTASONE) 10 MG tablet TAKE ONE TABLET BY MOUTH ONCE DAILY AS NEEDED FOR ARTHRITIS FLARE    predniSONE (DELTASONE) 10 MG tablet 5 mg.    predniSONE (DELTASONE) 2.5 MG tablet Take prednisone 3 pills daily for 2 weeks and then 2 tablets daily for 2 weeks and the one tablet daily    traMADol (ULTRAM) 50 mg tablet Take 1 tablet (50 mg total) by mouth every 6 (six) hours as needed for Pain.    walker (ULTRA-LIGHT ROLLATOR) Misc 1 Units by Misc.(Non-Drug; Combo Route) route once daily.     No current facility-administered medications for this visit.      ALLERGIES:   Review of patient's allergies indicates:   Allergen Reactions    Methotrexate analogues      Elevated Liver Enzyme    Bactrim  [sulfamethoxazole-trimethoprim] Nausea And Vomiting and Rash       Review of Systems:     Review of Systems   Constitutional: Positive for fatigue. Negative for appetite change, chills, diaphoresis, fever and unexpected weight change.   HENT:   Negative for hearing loss, mouth sores, nosebleeds, sore throat, trouble swallowing and voice change.    Eyes: Negative for eye problems and icterus.   Respiratory: Negative for chest tightness, cough, hemoptysis, shortness of breath and wheezing.    Cardiovascular: Negative for chest pain, leg swelling and palpitations.   Gastrointestinal: Negative for abdominal distention, abdominal pain, blood in stool, diarrhea, nausea and vomiting.   Endocrine: Negative for hot flashes.   Genitourinary: Negative for bladder incontinence, difficulty urinating, dysuria and hematuria.    Musculoskeletal: Positive for myalgias. Negative for arthralgias, back pain, flank pain, gait problem, neck pain and neck stiffness.   Skin: Negative for itching, rash and wound.   Neurological: Negative for dizziness, extremity weakness, gait problem, headaches, numbness, seizures and speech difficulty.   Hematological: Negative for adenopathy. Does not bruise/bleed easily.   Psychiatric/Behavioral: Negative for confusion, depression and sleep disturbance. The patient is not nervous/anxious.         Physical Exam:   Limited by virtual visit    ECOG Performance Status: (foot note - ECOG PS provided by Eastern Cooperative Oncology Group) 1 - Symptomatic but completely ambulatory    Karnofsky Performance Score:  80%- Normal Activity with Effort: Some Symptoms of Disease    Labs:   Labs done last week in [place] were reviewed and within normal limits.    Imaging: previous imaging has been reviewed    Assessment and Plan:     Mrs. Mac is a pleasant 70 year old female with recently diagnosed AML.      Acute Monocytic Leukemia  -- Admitted from [facility] on 5/25/18 early AM with WBC around 100s, for  suspected new non-M3 AML  --lo res HLA typing on 5/26/18 and hi res on 5/27/18 done in anticipation of possible need for future transplant   --Pt with two daughters, two full sisters (in their mid 60s, one with brain aneurysm hx, other one without any medical issues), and one full brother (61 y/o healthy).  --NPM1 +, CEBPA (-), FLT3 (+).  On Midostaurin 50 mg PO BID until Day 14 (held starting 6/8 to 6/11). Stopped when FLAG JENNIFER re-induction started. Restarted Midostaurin at 25 mg bid on day 8 of FLAG JENNIFER induction (6/22), increase to full dose 50 mg bid (6/26) as improvement in liver enzymes. Stopped 7/3/18 due to abnormal cardiac echo.  --day 14 bone marrow biopsy completed 6/11 showing persistent disease with 15% CD 34 positive blasts  --Day 60 of FLAG-JENNIFER, tolerating without difficulty except nausea/vomiting  --day 14 restaging bone marrow biopsy done 6/29 without complication, results with no evidence of AML, FLT 3 negative, will need repeat marrow at count recovery outpatient   --recovery marrow showed no evidence of disease  --HiDAC #1 on 8/14/18, HiDAC#2 on 9/11/18  --Cycle 3 delayed 1 week due to pancytopenia. Started on 10/16/18  --recovery bone marrow showed no signs of residual leukemia  --Bone marrow biopsy done in May 2019 shows no overt signs of recurrent disease    Left sided jaw dysfunction  --MRI revealed degenerative changes  --Seen by ENT    Thrombocytopenia  --Previously in the 70-90 range  --now stable over 100k    Suspected Leukemia Cutis  --pathology from dermatology confirmed inflammatory changes  --now resolved with topical steroid cream    Abnormal Liver Function Tests  --ALT and AST and Alk phos have again increased substantially since re-starting midostaurin.   --midostaurin started day 8 at 25 mg bid, monitoring liver enzymes closely and improved. Increasing Midostaurin to 50 mg bid 6/26. Stopped Midostaurin (7/3) due to new diagnosis of systolic heart failure EF 35%.  --6/22/18 liver  US showing mild intrahepatic dilation, otherwise impression of hepatic steatosis.  Will hold off of MRCP at this time.    --permanently discontinued midostaurin     Chemotherapy induced cardiomyopathy  --cardiology follow-up as outpatient in 3 months  --repeat 2D echo on 6/15 with preserved EF of 60%.  However 7/2/18 echo with reduced EF 30-35%  --Echo on 8/15/18 was within normal range EF of 60-65%    Rheumatologic issues  --Previously on steroids and methotrexate  --working with [facility] [place], [place] rheumatology    30 minutes were spent face to face with the patient and her family to discuss the disease, natural history, treatment options and survival statistics. I have provided the patient with an opportunity to ask questions and have all questions answered to her satisfaction.     she will return to the clinic in 4 weeks, but knows to call in the interim if symptoms change or should a problem arise.        Laquita Troy MD  Hematology and Medical Oncology  Bone Marrow Transplant  [facility]

## 2020-09-02 RX ORDER — PREDNISONE 20 MG/1
20 TABLET ORAL DAILY
Qty: 10 TABLET | Refills: 0 | Status: SHIPPED | OUTPATIENT
Start: 2020-09-02 | End: 2020-12-21

## 2020-09-03 ENCOUNTER — OFFICE VISIT (OUTPATIENT)
Dept: HEMATOLOGY/ONCOLOGY | Facility: CLINIC | Age: 70
End: 2020-09-03
Payer: MEDICARE

## 2020-09-03 DIAGNOSIS — E87.8 ELECTROLYTE ABNORMALITY: ICD-10-CM

## 2020-09-03 DIAGNOSIS — M81.0 OSTEOPOROSIS, UNSPECIFIED OSTEOPOROSIS TYPE, UNSPECIFIED PATHOLOGICAL FRACTURE PRESENCE: ICD-10-CM

## 2020-09-03 DIAGNOSIS — M35.1 MCTD (MIXED CONNECTIVE TISSUE DISEASE): ICD-10-CM

## 2020-09-03 DIAGNOSIS — C92.00 ACUTE MYELOID LEUKEMIA NOT HAVING ACHIEVED REMISSION: Primary | ICD-10-CM

## 2020-09-03 PROCEDURE — 99215 OFFICE O/P EST HI 40 MIN: CPT | Mod: 95,,, | Performed by: INTERNAL MEDICINE

## 2020-09-03 PROCEDURE — 99215 PR OFFICE/OUTPT VISIT, EST, LEVL V, 40-54 MIN: ICD-10-PCS | Mod: 95,,, | Performed by: INTERNAL MEDICINE

## 2020-09-03 NOTE — Clinical Note
1. Labs in [place] on Tuesday 9/8: cbc,cmp, vitamin b12, methelmelonic acid, folate, pathologist interpretation  2. See me in 1 month virtually

## 2020-09-03 NOTE — PROGRESS NOTES
Hematology and Medical Oncology   Follow Up Note -- Virtual Visit     09/03/2020    Primary Oncologic Diagnosis: AML, FLT 3 + and NPMN1+.     The patient location is: home  The chief complaint leading to consultation is: AML follow up  Visit type: Virtual visit with synchronous audio and video  Total time spent with patient: 30  Each patient to whom he or she provides medical services by telemedicine is:  (1) informed of the relationship between the physician and patient and the respective role of any other health care provider with respect to management of the patient; and (2) notified that he or she may decline to receive medical services by telemedicine and may withdraw from such care at any time.      History of Present Ilness:   Sabrina Badillo) is a pleasant 70 y.o.female presents to clinic following 2 induction therapies for AML. She was in the hospital roughly 50 days to receive 7+3 and then FLAG JENNIFER.    Oncology History:   68 y.o. female admitted overnight for possible new AML. Came in from OSH with elevated WBC of 100.   05/25/2018: Pt is now on hydrea 2 grams BID, allopurinol 300 mg daily, and IVF. Pt may need rasburicase this weekend. She will undergo a BM bx and aspiration today. She is an induction candidate and will not be screened for research trials. She has anxiety for which she usually takes xanax, this was re-started.   05/26/2018 PICC placed. Reports she slept well last night. Anxiety improved with prn xanax. EF 65%, HIV and Hep panel negative. 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  6/12/2018: Day 15 of 7+3 induction, restaging BM bx done yesterday. On Midostaurin. Fever yesterday and BC with gram + cocci in clusters. On cefepime day 2 and will start Vanc today. Labial mucositis improving.   6/13/2018: Day 16 7+3. BC with staph aureus, identification pending. Surveillance blood cultures done today. Afebrile x 48 hours. On cefepime and Vanc. Labial mucositis resolved. No complaints of  pain. Nausea controlled. No diarrhea. Primary complaint is fatigue.   6/14/20018: Day 17 of 7+3. Day 14 bone marrow results pending. BC with staph epidermis only sensitive to Vancomycin. Vanc day 3 and cefepime day 4. Vanc trough 12.2 last night. BP remains low but stable. Afebrile x 72 hours.   6/15/2018: Day 18 of 7+3. Day 14 bone marrow biopsy shows persistent disease with 15% blasts. Discussion with patient regarding treatment and she is deciding if she wants to proceed with re-induction vs outpatient maintenance. Temp of 100.4 overnight, unsustained. Day 5 Cefepime and Day 4 of Vanc for staph epidermis. Repeat cultures NGTD. BP improved. Electrolytes (mag, phos, K and calcium) replaced aggressively this am and will repeat labs this afternoon. No complaints this am.   06/16/2018: Day 19 of 7+3. Day 2 of Flag-JENNIFER. Remains afebrile. Calcium remains low and have increased PO supplementation. Remains on vanc and aztreonam. Somewhat loopy feeling after receiving benadryl.   06/17/2018: Day 20 of 7+3. Day 3 of FLAG-JENNIFER. Multiple electrolyte abnormalities. New bilateral leg and feet swelling.   6/18/2018: Day 21 of 7+3 and Day 4 of FLAG JENNIFER. Tolerating well with some nausea controlled with Zofran. VSS, afebrile. ANC 1900 on Neupogen. Continues Vanc for staph epi bacteremia. Multiple electrolytes replaced today. Complains of edema to lower extremities, not improved after 20 of lasix yesterday. No sob or CP.   6/19/18: Day 22 of 7+3 and Day 5 of FLAG JENNIFER. VSS, afebrile, ANC 3900. Vanc trough elevated and dose adjusted this am. Lower extremity edema improved after 40 of lasix yesterday, net negative 300 cc I&O. Mild nausea, no abdominal pain or diarrhea. Poor appetite but no difficulty eating or taking pills.   6/20/18: Day 23 of 7+3 and Day 6 of FLAG JENNIFER. Patient complains of nausea and vomiting overnight and this am, especially when taking pills. Will add Zyprexa daily in addition to Zofran, compazine, and ativan PRN  and will change meds to IV. 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 Afebrile.  No diarrhea, mouth sores or pain.  06/21/2018: Day 24 of 7+3 and Day 7 of FLAG JENNIFER. Nausea better controlled. Continued on Vanc.  6/22/2018: Day 25 of 7+3 and Day 8 of FLAG JENNIFER. Nausea improved some with Zyprexa but did have 1 episode of emesis overnight.continuing meds IV due to vomiting. Remains afebrile. ANC 0. Continues Vanc and Flagyl. Electrolytes replaced.   06/23/2018 : day 26 of 7+3 and Day 9 of FLAG JENNIFER.  Nausea persists, worsened after taking pills so meds converted to IV.  Encouraged ambulation.  Continue with flagyl for leptotrichia goodfellowii.  RUQ US showed intrahepatic dilation, overall impression hepatic steatosis.  CBD 0.5cm.  No Gallbladder.    06/24/2018 : day 27 of 7+3 and Day 10 of FLAG JENNIFER.  Nausea persisting, able to tolerate some po pills.  Last day of flagyl (day 5).  Still pancytopenic. Appetite still low as po intake triggers nausea.  06/25/2018: day 28 of 7+3 and Day 11 of FLAG JENNIFER. Afebrile, ANC 0. Has completed Flagyl. Will decrease Vanc to 1000 mg q12, trough 19.9, will complete Vanc on 6/27. Liver enzymes stable on Midostaurin. VSS.   6/26/2018: day 29 of 7+3 and day 12 of FLAG JENNIFER. Liver enzymes improved and Midostaurin increased to full dose 50 mg bid. Nausea controlled, afebrile, VSS, NEON.  6/27/2018: day 30 of 7+3 and Day 13 of FLAG JENNIFER. Persistent nausea and emesis x 1 yesterday. Compazine changed to scheduled. Vanc ends today. Afebrile, VSS. Aggressive electrolyte replacement, low electrolytes possibly due to Midostaurin.   6/28/2018: day 31 of 7+3 and Day 14 of FLAG JENNIFER. Nausea improved with scheduled Compazine. Patient has agreed to start converting some IV meds to po. VSS, afebrile, electrolytes wnl today. Only complains of fatigue, new rash noted to upper back and chest and legs, papular rash mildly red.  6/29/2018: Day  32 of 7+3 and Day 15 of FLAG JENNIFER. Day 14 restaging bone marrow biopsy done at bedside today. Patient states nausea improved but she did have emesis last night after taking 9 pm pills. Rash improved. No mouth sores, diarrhea or pain.   7/2/2018: Day 35 of 7+3 and Day 18 of FLAG JENNIFER. Restaging BM bx results pending. Fluid overload over the weekend. Chest x-ray with pulmonary edema. Os sats down to 88%. Placed on O2 at 2 L NC, off O2 this am. Received lasix. Net negative 2.7 L today. 1 episode of nausea, no emesis. Reports anxiety relieved with PRN meds. Electrolytes improved, afebrile, VSS.   7/3/2018: Day 36 of 7+3 and Day 19 of FLAG JENNIFER. Restaging Bm bx with GABRIEL. Cardiology consulted for abnormal echo, EF 30% with pulmonary HTN and severe L atrial enlargement. EKG with T wave abnormality, possible anterolateral ischemia and prolonged QTc of 530. Medications adjusted. Nausea controlled, no diarrhea. Electrolytes improved. On O2 as needed.   07/04/2018 : Day 37 of 7+3 and Day 20 of FLAG JENNIFER Diarrhea in AM, watery, no associated abdominal pain, nausea, or vomiting.    07/05/2018: Day 38 of 7+3 ane Day 21 of FLAG JENNIFER. No CP or SOB, cardiology following. On RA. All meds changed to po, denies nausea or vomiting and no diarrhea. VSS, afebrile.   7/6/2018: Day 22 of FLAG JENNIFER. Continues to tolerate po meds with no nausea. Afebrile. Awaiting count recovery for discharge.  7/7/2018-/8/2018: Day 40/41 of 7+3, Day 23/24 of FLAG JENNIFER.  Improving clinically.  Started on mag oxide per patient request.  Labs showing signs of ANC recovery.  7/9/2018: Day 25 FLAG JENNIFER, , continues Neupogen. Received platelets today. Afebrile, VSS. Tolerating all oral meds with no nausea or vomiting. Only complains of fatigue.   7/10/2018: Day 26 FLAG JENNIFER,  today. Had 2 episodes of vomiting and diarrhea last night. Feeling better. Afebrile, VSS.   7/11/2018: Day 27 FLAG JENNIFER,  today. No vomiting or diarrhea overnight and no nausea.  Afebrile, VSS. Complains of back pain (bone pain) suspect due to count recovery. Relieved with oxy IR and Zyrtec started.  07/12/2018: Day 28 flag jennifer, engrafted today with ANC of 730. Back pain improved with zyrtec. Scheduled for IT chemo tomorrow at 1:30 pm and discharge home thereafter. Will likely need plt transfusion prior to IT chemo tomorrow   7/13/2018: Day 29 of FLAG JENNIFER. ANC up to 1300 today. Transfusing platelets today prior to LP for IT chemo. Patient continues to complain of bone pain, mostly to back and legs. No nausea, diarrhea, sob. Afebrile and VSS.   --hospitalized 7/23-7/25 for C.diff diarrhea, neutropenic fever.  While inpatient she developed pink blisters on her palm that were concerning for relapse.  --palm biopsy on 7/26/18 for suspicion of leukemia cutis was also negative for sign of relapse  --bone marrow biopsy 7/31/18 was negative for signs of relapse   --Admitted on 8/14/18 for HiDAC#1  --Admitted on 9/11/18 for HiDAC#2  --Admitted on 10/16/18 for HiDAC#3  --Post consolidation bone marrow biopsy on 12/27/2018: AML FISH (BM) Interpretation: The result is within normal limits for the AML FISH panel. No evidence of residual disease.  --Bone Marrow Biopsy on 5/9/19 NO MORPHOLOGIC EVIDENCE OF RESIDUAL ACUTE MYELOID LEUKEMIA. RECOMMEND NGS STUDY TO RULE OUT RESIDUAL DISEASE. deletion 20q    Interval History:   Mrs. Wilkes has been doing well. We were able to see each other well through facetime without issue. Denies fevers/chills, night sweats, lymphadenopathy, bleeding/bruising.    She has noticed that her energy is declining.  Takes a nap everyday. No bleeding or bruising to suggest an over relapse of leukemia.    Past Medical History:   Past Medical History:   Diagnosis Date    Cancer 03/2018    AML    CHF (congestive heart failure)     Diarrhea 9/14/2018    Encounter for blood transfusion        Current Medications:   Current Outpatient Medications   Medication Sig    ALPRAZolam  (XANAX) 0.5 MG tablet Take 1 tablet (0.5 mg total) by mouth nightly as needed for Insomnia or Anxiety.    ALPRAZolam (XANAX) 1 MG tablet Take 0.5 tablets (0.5 mg total) by mouth nightly as needed for Insomnia.    clotrimazole (MYCELEX) 10 mg sultana     diphenhydrAMINE (BENADRYL) 50 MG capsule Take 50 mg by mouth nightly as needed for Itching or Insomnia.    famotidine (PEPCID) 20 MG tablet     famotidine (PEPCID) 40 MG tablet Take 1 tablet (40 mg total) by mouth nightly.    ibuprofen (ADVIL,MOTRIN) 600 MG tablet Take 1 tablet (600 mg total) by mouth every 6 (six) hours as needed (left ear/jaw pain).    LORazepam (ATIVAN) 0.5 MG tablet Take 1 tablet (0.5 mg total) by mouth every 6 (six) hours as needed (nausea).    metoprolol succinate (TOPROL-XL) 25 MG 24 hr tablet TAKE ONE TABLET BY MOUTH ONCE DAILY    ofloxacin (FLOXIN) 0.3 % otic solution Place 5 drops into the left ear once daily.    omeprazole (PRILOSEC OTC) 20 MG tablet Take 20 mg by mouth every morning.    omeprazole (PRILOSEC) 40 MG capsule Take 1 capsule (40 mg total) by mouth before breakfast. Take on empty stomach 30-60 minutes before meal    ondansetron (ZOFRAN) 8 MG tablet     oxyCODONE-acetaminophen (PERCOCET) 7.5-325 mg per tablet Take 1 tablet by mouth every 4 (four) hours as needed for Pain.    predniSONE (DELTASONE) 10 MG tablet TAKE ONE TABLET BY MOUTH ONCE DAILY AS NEEDED FOR ARTHRITIS FLARE    predniSONE (DELTASONE) 10 MG tablet 5 mg.    predniSONE (DELTASONE) 2.5 MG tablet Take prednisone 3 pills daily for 2 weeks and then 2 tablets daily for 2 weeks and the one tablet daily    predniSONE (DELTASONE) 20 MG tablet Take 1 tablet (20 mg total) by mouth once daily.    traMADol (ULTRAM) 50 mg tablet Take 1 tablet (50 mg total) by mouth every 6 (six) hours as needed for Pain.    walker (ULTRA-LIGHT ROLLATOR) Misc 1 Units by Misc.(Non-Drug; Combo Route) route once daily.     No current facility-administered medications for this  visit.      ALLERGIES:   Review of patient's allergies indicates:   Allergen Reactions    Methotrexate analogues      Elevated Liver Enzyme    Bactrim [sulfamethoxazole-trimethoprim] Nausea And Vomiting and Rash       Review of Systems:     Review of Systems   Constitutional: Positive for fatigue. Negative for appetite change, chills, diaphoresis, fever and unexpected weight change.   HENT:   Negative for hearing loss, mouth sores, nosebleeds, sore throat, trouble swallowing and voice change.    Eyes: Negative for eye problems and icterus.   Respiratory: Negative for chest tightness, cough, hemoptysis, shortness of breath and wheezing.    Cardiovascular: Negative for chest pain, leg swelling and palpitations.   Gastrointestinal: Negative for abdominal distention, abdominal pain, blood in stool, diarrhea, nausea and vomiting.   Endocrine: Negative for hot flashes.   Genitourinary: Negative for bladder incontinence, difficulty urinating, dysuria and hematuria.    Musculoskeletal: Positive for myalgias. Negative for arthralgias, back pain, flank pain, gait problem, neck pain and neck stiffness.   Skin: Negative for itching, rash and wound.   Neurological: Negative for dizziness, extremity weakness, gait problem, headaches, numbness, seizures and speech difficulty.   Hematological: Negative for adenopathy. Does not bruise/bleed easily.   Psychiatric/Behavioral: Negative for confusion, depression and sleep disturbance. The patient is not nervous/anxious.         Physical Exam:   Limited by virtual visit    ECOG Performance Status: (foot note - ECOG PS provided by Eastern Co[employer]cology [employer]) 1 - Symptomatic but completely ambulatory    Karnofsky Performance Score:  80%- Normal Activity with Effort: Some Symptoms of Disease    Labs:   Labs done last week in [place]    WBC: 2.1  Hgb: 9.7  Hct: 31.3  MCV:106  Platelets: 79    Imaging: previous imaging has been reviewed    Assessment and Plan:     Mrs. Mac is  a pleasant 70 year old female with recently diagnosed AML.      Acute Monocytic Leukemia  -- Admitted from [facility] on [facility]18 early AM with WBC around 100s, for suspected new non-M3 AML  --lo res HLA typing on 5/26/18 and hi res on 5/27/18 done in anticipation of possible need for future transplant   --Pt with two daughters, two full sisters (in their mid 60s, one with brain aneurysm hx, other one without any medical issues), and one full brother (61 y/o healthy).  --NPM1 +, CEBPA (-), FLT3 (+).  On Midostaurin 50 mg PO BID until Day 14 (held starting 6/8 to 6/11). Stopped when FLAG JENNIFER re-induction started. Restarted Midostaurin at 25 mg bid on day 8 of FLAG JENNIFER induction (6/22), increase to full dose 50 mg bid (6/26) as improvement in liver enzymes. Stopped 7/3/18 due to abnormal cardiac echo.  --day 14 bone marrow biopsy completed 6/11 showing persistent disease with 15% CD 34 positive blasts  --Day 60 of FLAG-JENNIFER, tolerating without difficulty except nausea/vomiting  --day 14 restaging bone marrow biopsy done 6/29 without complication, results with no evidence of AML, FLT 3 negative, will need repeat marrow at count recovery outpatient   --recovery marrow showed no evidence of disease  --HiDAC #1 on 8/14/18, HiDAC#2 on 9/11/18  --Cycle 3 delayed 1 week due to pancytopenia. Started on 10/16/18  --recovery bone marrow showed no signs of residual leukemia  --Bone marrow biopsy done in May 2019 shows no overt signs of recurrent disease    Macrocytic Anemia  --Will check labs at [facility] to evaluate for vitamin deficiency driving the increasing macrocytosis      Left sided jaw dysfunction  --MRI revealed degenerative changes  --Seen by ENT    Thrombocytopenia  --Maintaining in the 70-90 range      Suspected Leukemia Cutis  --pathology from dermatology confirmed inflammatory changes  --now resolved with topical steroid cream    Abnormal Liver Function Tests  --ALT and AST and Alk phos have again  increased substantially since re-starting midostaurin.   --midostaurin started day 8 at 25 mg bid, monitoring liver enzymes closely and improved. Increasing Midostaurin to 50 mg bid 6/26. Stopped Midostaurin (7/3) due to new diagnosis of systolic heart failure EF 35%.  --6/22/18 liver US showing mild intrahepatic dilation, otherwise impression of hepatic steatosis.  Will hold off of MRCP at this time.    --permanently discontinued midostaurin     Chemotherapy induced cardiomyopathy  --cardiology follow-up as outpatient in 3 months  --repeat 2D echo on 6/15 with preserved EF of 60%.  However 7/2/18 echo with reduced EF 30-35%  --Echo on 8/15/18 was within normal range EF of 60-65%    Rheumatologic issues  --Previously on steroids and methotrexate  --working with [facility] [place] [place] rheumatology    30 minutes were spent face to face with the patient and her family to discuss the disease, natural history, treatment options and survival statistics. I have provided the patient with an opportunity to ask questions and have all questions answered to her satisfaction.     she will return to the clinic in 4 weeks, but knows to call in the interim if symptoms change or should a problem arise.        Laquita Troy MD  Hematology and Medical Oncology  Bone Marrow Transplant  [facility]

## 2020-09-21 DIAGNOSIS — F41.9 ANXIETY: Primary | ICD-10-CM

## 2020-09-21 RX ORDER — LORAZEPAM 1 MG/1
1 TABLET ORAL 2 TIMES DAILY
Qty: 60 TABLET | Refills: 0 | Status: SHIPPED | OUTPATIENT
Start: 2020-09-21 | End: 2023-03-14

## 2020-09-23 ENCOUNTER — PROCEDURE VISIT (OUTPATIENT)
Dept: HEMATOLOGY/ONCOLOGY | Facility: CLINIC | Age: 70
End: 2020-09-23
Payer: MEDICARE

## 2020-09-23 ENCOUNTER — LAB VISIT (OUTPATIENT)
Dept: LAB | Facility: HOSPITAL | Age: 70
End: 2020-09-23
Payer: MEDICARE

## 2020-09-23 VITALS
RESPIRATION RATE: 16 BRPM | OXYGEN SATURATION: 98 % | TEMPERATURE: 98 F | SYSTOLIC BLOOD PRESSURE: 115 MMHG | HEART RATE: 108 BPM | DIASTOLIC BLOOD PRESSURE: 63 MMHG

## 2020-09-23 DIAGNOSIS — C93.01 ACUTE MONOCYTIC LEUKEMIA IN REMISSION: Primary | ICD-10-CM

## 2020-09-23 DIAGNOSIS — C92.02 AML (ACUTE MYELOID LEUKEMIA) IN RELAPSE: Primary | ICD-10-CM

## 2020-09-23 DIAGNOSIS — C93.01 ACUTE MONOCYTIC LEUKEMIA IN REMISSION: ICD-10-CM

## 2020-09-23 DIAGNOSIS — C92.00 AML (ACUTE MYELOBLASTIC LEUKEMIA): ICD-10-CM

## 2020-09-23 LAB
ALBUMIN SERPL BCP-MCNC: 4.1 G/DL (ref 3.5–5.2)
ALP SERPL-CCNC: 109 U/L (ref 55–135)
ALT SERPL W/O P-5'-P-CCNC: 12 U/L (ref 10–44)
ANION GAP SERPL CALC-SCNC: 9 MMOL/L (ref 8–16)
AST SERPL-CCNC: 14 U/L (ref 10–40)
BILIRUB SERPL-MCNC: 0.5 MG/DL (ref 0.1–1)
BUN SERPL-MCNC: 12 MG/DL (ref 8–23)
CALCIUM SERPL-MCNC: 9.5 MG/DL (ref 8.7–10.5)
CHLORIDE SERPL-SCNC: 102 MMOL/L (ref 95–110)
CO2 SERPL-SCNC: 26 MMOL/L (ref 23–29)
CREAT SERPL-MCNC: 1.2 MG/DL (ref 0.5–1.4)
ERYTHROCYTE [DISTWIDTH] IN BLOOD BY AUTOMATED COUNT: 15.2 % (ref 11.5–14.5)
EST. GFR  (AFRICAN AMERICAN): 53.3 ML/MIN/1.73 M^2
EST. GFR  (NON AFRICAN AMERICAN): 46.2 ML/MIN/1.73 M^2
GLUCOSE SERPL-MCNC: 112 MG/DL (ref 70–110)
HCT VFR BLD AUTO: 31.2 % (ref 37–48.5)
HGB BLD-MCNC: 9.8 G/DL (ref 12–16)
IMM GRANULOCYTES # BLD AUTO: 0.03 K/UL (ref 0–0.04)
MCH RBC QN AUTO: 32.1 PG (ref 27–31)
MCHC RBC AUTO-ENTMCNC: 31.4 G/DL (ref 32–36)
MCV RBC AUTO: 102 FL (ref 82–98)
NEUTROPHILS # BLD AUTO: 0.5 K/UL (ref 1.8–7.7)
PLATELET # BLD AUTO: 90 K/UL (ref 150–350)
PMV BLD AUTO: 9.9 FL (ref 9.2–12.9)
POTASSIUM SERPL-SCNC: 4.6 MMOL/L (ref 3.5–5.1)
PROT SERPL-MCNC: 7.7 G/DL (ref 6–8.4)
RBC # BLD AUTO: 3.05 M/UL (ref 4–5.4)
SODIUM SERPL-SCNC: 137 MMOL/L (ref 136–145)
WBC # BLD AUTO: 1.98 K/UL (ref 3.9–12.7)

## 2020-09-23 PROCEDURE — 88341 IMHCHEM/IMCYTCHM EA ADD ANTB: CPT | Performed by: PATHOLOGY

## 2020-09-23 PROCEDURE — 88189 PR  FLOWCYTOMETRY/READ, 16 & > MARKERS: ICD-10-PCS | Mod: ,,, | Performed by: PATHOLOGY

## 2020-09-23 PROCEDURE — 38222 PR BONE MARROW BIOPSY(IES) W/ASPIRATION(S); DIAGNOSTIC: ICD-10-PCS | Mod: S$PBB,LT,, | Performed by: NURSE PRACTITIONER

## 2020-09-23 PROCEDURE — 88341 PR IHC OR ICC EACH ADD'L SINGLE ANTIBODY  STAINPR: ICD-10-PCS | Mod: 26,,, | Performed by: PATHOLOGY

## 2020-09-23 PROCEDURE — 88313 SPECIAL STAINS GROUP 2: CPT | Mod: 59 | Performed by: PATHOLOGY

## 2020-09-23 PROCEDURE — 88189 FLOWCYTOMETRY/READ 16 & >: CPT | Mod: ,,, | Performed by: PATHOLOGY

## 2020-09-23 PROCEDURE — 88341 IMHCHEM/IMCYTCHM EA ADD ANTB: CPT | Mod: 26,,, | Performed by: PATHOLOGY

## 2020-09-23 PROCEDURE — 85097 PR  BONE MARROW,SMEAR INTERPRETATION: ICD-10-PCS | Mod: ,,, | Performed by: PATHOLOGY

## 2020-09-23 PROCEDURE — 88185 FLOWCYTOMETRY/TC ADD-ON: CPT | Performed by: PATHOLOGY

## 2020-09-23 PROCEDURE — 85027 COMPLETE CBC AUTOMATED: CPT

## 2020-09-23 PROCEDURE — 85097 BONE MARROW INTERPRETATION: CPT | Mod: ,,, | Performed by: PATHOLOGY

## 2020-09-23 PROCEDURE — 80053 COMPREHEN METABOLIC PANEL: CPT

## 2020-09-23 PROCEDURE — 88342 CHG IMMUNOCYTOCHEMISTRY: ICD-10-PCS | Mod: 26,59,, | Performed by: PATHOLOGY

## 2020-09-23 PROCEDURE — 88342 IMHCHEM/IMCYTCHM 1ST ANTB: CPT | Mod: 59 | Performed by: PATHOLOGY

## 2020-09-23 PROCEDURE — 88311 PR  DECALCIFY TISSUE: ICD-10-PCS | Mod: 26,,, | Performed by: PATHOLOGY

## 2020-09-23 PROCEDURE — 88305 TISSUE EXAM BY PATHOLOGIST: ICD-10-PCS | Mod: 26,,, | Performed by: PATHOLOGY

## 2020-09-23 PROCEDURE — 88305 TISSUE EXAM BY PATHOLOGIST: CPT | Mod: 59 | Performed by: PATHOLOGY

## 2020-09-23 PROCEDURE — 88311 DECALCIFY TISSUE: CPT | Performed by: PATHOLOGY

## 2020-09-23 PROCEDURE — 88342 IMHCHEM/IMCYTCHM 1ST ANTB: CPT | Mod: 26,59,, | Performed by: PATHOLOGY

## 2020-09-23 PROCEDURE — 36415 COLL VENOUS BLD VENIPUNCTURE: CPT

## 2020-09-23 PROCEDURE — 88313 SPECIAL STAINS GROUP 2: CPT | Mod: 26,,, | Performed by: PATHOLOGY

## 2020-09-23 PROCEDURE — 38222 DX BONE MARROW BX & ASPIR: CPT | Mod: S$PBB,LT,, | Performed by: NURSE PRACTITIONER

## 2020-09-23 PROCEDURE — 88313 PR  SPECIAL STAINS,GROUP II: ICD-10-PCS | Mod: 26,,, | Performed by: PATHOLOGY

## 2020-09-23 PROCEDURE — 88305 TISSUE EXAM BY PATHOLOGIST: CPT | Mod: 26,,, | Performed by: PATHOLOGY

## 2020-09-23 PROCEDURE — 88311 DECALCIFY TISSUE: CPT | Mod: 26,,, | Performed by: PATHOLOGY

## 2020-09-23 PROCEDURE — 88184 FLOWCYTOMETRY/ TC 1 MARKER: CPT | Performed by: PATHOLOGY

## 2020-09-23 NOTE — PROCEDURES
Bone marrow    Date/Time: 9/23/2020 1:30 PM  Performed by: Sydnie Jeff NP  Authorized by: Sydnie Jeff NP     Consent Done?:  Yes (Written)  Aspiration?: Yes    Biopsy?: Yes      PROCEDURE NOTE:  Date of Procedure: 09/23/2020  Bone Marrow Biopsy and Aspiration  Indication: AML, concern for relapse  Consent: Informed consent was obtained from patient.  Timeout: Done and documented.  Position: prone  Site: Left posterior illiac crest.  Prep: Betadine.  Needle used: 11 gauge Jamshidi needle.  Anesthetic: 2% lidocaine 10 cc.  Biopsy: The biopsy needle was introduced into the marrow cavity and an aspirate was obtained without complications and sent for flow cytometry, AML fish, NGS and cytogenetics. Core biopsy obtained without difficulty and sent for routine histologic examination.  Complications: None.  Disposition: The patient was placed supine for 20min following procedure. RN to assess bandaid for bleeding prior to discharge home.  Blood loss: Minimal.     Sydnie Jeff NP  Hematology/Oncology/BMT

## 2020-09-28 LAB
AML FISH ADDITIONAL INFORMATION (BM): NORMAL
AML FISH DISCLAIMER (BM): NORMAL
AML FISH REASON FOR REFERRAL (BM): NORMAL
AML FISH RELEASED BY (BM): NORMAL
AML FISH RESULT (BM): NORMAL
AML FISH RESULT SUMMARY (BM): NORMAL
AML FISH RESULT TABLE (BM): NORMAL
CHROM BANDING METHOD: NORMAL
CHROMOSOME ANALYSIS BM ADDITIONAL INFORMATION: NORMAL
CHROMOSOME ANALYSIS BM RELEASED BY: NORMAL
CHROMOSOME ANALYSIS BM RESULT SUMMARY: NORMAL
CLINICAL CYTOGENETICIST REVIEW: NORMAL
CLINICAL CYTOGENETICIST REVIEW: NORMAL
FAMLB SPECIMEN: NORMAL
KARYOTYP MAR: NORMAL
REASON FOR REFERRAL (NARRATIVE): NORMAL
REF LAB TEST METHOD: NORMAL
REF LAB TEST METHOD: NORMAL
SPECIMEN SOURCE: NORMAL
SPECIMEN SOURCE: NORMAL
SPECIMEN: NORMAL

## 2020-09-30 ENCOUNTER — OFFICE VISIT (OUTPATIENT)
Dept: HEMATOLOGY/ONCOLOGY | Facility: CLINIC | Age: 70
End: 2020-09-30
Payer: MEDICARE

## 2020-09-30 VITALS
TEMPERATURE: 98 F | HEIGHT: 62 IN | SYSTOLIC BLOOD PRESSURE: 124 MMHG | RESPIRATION RATE: 20 BRPM | BODY MASS INDEX: 33.65 KG/M2 | WEIGHT: 182.88 LBS | HEART RATE: 91 BPM | OXYGEN SATURATION: 100 % | DIASTOLIC BLOOD PRESSURE: 58 MMHG

## 2020-09-30 DIAGNOSIS — C92.02 ACUTE MYELOID LEUKEMIA IN RELAPSE: Primary | ICD-10-CM

## 2020-09-30 PROCEDURE — 99999 PR PBB SHADOW E&M-EST. PATIENT-LVL IV: ICD-10-PCS | Mod: PBBFAC,,, | Performed by: INTERNAL MEDICINE

## 2020-09-30 PROCEDURE — 99215 OFFICE O/P EST HI 40 MIN: CPT | Mod: S$PBB,,, | Performed by: INTERNAL MEDICINE

## 2020-09-30 PROCEDURE — 99999 PR PBB SHADOW E&M-EST. PATIENT-LVL IV: CPT | Mod: PBBFAC,,, | Performed by: INTERNAL MEDICINE

## 2020-09-30 PROCEDURE — 99214 OFFICE O/P EST MOD 30 MIN: CPT | Mod: PBBFAC | Performed by: INTERNAL MEDICINE

## 2020-09-30 PROCEDURE — 99215 PR OFFICE/OUTPT VISIT, EST, LEVL V, 40-54 MIN: ICD-10-PCS | Mod: S$PBB,,, | Performed by: INTERNAL MEDICINE

## 2020-09-30 RX ORDER — ESCITALOPRAM OXALATE 20 MG/1
TABLET ORAL
COMMUNITY
Start: 2020-08-22 | End: 2020-12-22 | Stop reason: SDUPTHER

## 2020-09-30 NOTE — Clinical Note
UPDATE  1. Change of treatment plan to decitabine + venetoclax   2. Insurance auth and schedule decitabine x 5day (hopefully starting on the 12th)

## 2020-09-30 NOTE — Clinical Note
1. Echo and EKG at [place] early next week   2.Twice weekly labs at [place]: cbc,cmp, mag, phos, ldh, ddimer, inr  3. See me in about 5 weeks

## 2020-09-30 NOTE — PROGRESS NOTES
Hematology and Medical Oncology   Follow Up Note     09/30/2020    Primary Oncologic Diagnosis: AML, FLT 3 + and NPMN1+.   History of Present Ilness:   Sabrina Badillo) is a pleasant 70 y.o.female presents to clinic following 2 induction therapies for AML. She was in the hospital roughly 50 days to receive 7+3 and then FLAG JENNIFER.    Oncology History:   68 y.o. female admitted overnight for possible new AML. Came in from OSH with elevated WBC of 100.   05/25/2018: Pt is now on hydrea 2 grams BID, allopurinol 300 mg daily, and IVF. Pt may need rasburicase this weekend. She will undergo a BM bx and aspiration today. She is an induction candidate and will not be screened for research trials. She has anxiety for which she usually takes xanax, this was re-started.   05/26/2018 PICC placed. Reports she slept well last night. Anxiety improved with prn xanax. EF 65%, HIV and Hep panel negative. 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  6/12/2018: Day 15 of 7+3 induction, restaging BM bx done yesterday. On Midostaurin. Fever yesterday and BC with gram + cocci in clusters. On cefepime day 2 and will start Vanc today. Labial mucositis improving.   6/13/2018: Day 16 7+3. BC with staph aureus, identification pending. Surveillance blood cultures done today. Afebrile x 48 hours. On cefepime and Vanc. Labial mucositis resolved. No complaints of pain. Nausea controlled. No diarrhea. Primary complaint is fatigue.   6/14/20018: Day 17 of 7+3. Day 14 bone marrow results pending. BC with staph epidermis only sensitive to Vancomycin. Vanc day 3 and cefepime day 4. Vanc trough 12.2 last night. BP remains low but stable. Afebrile x 72 hours.   6/15/2018: Day 18 of 7+3. Day 14 bone marrow biopsy shows persistent disease with 15% blasts. Discussion with patient regarding treatment and she is deciding if she wants to proceed with re-induction vs outpatient maintenance. Temp of 100.4 overnight, unsustained. Day 5 Cefepime and Day 4 of  Vanc for staph epidermis. Repeat cultures NGTD. BP improved. Electrolytes (mag, phos, K and calcium) replaced aggressively this am and will repeat labs this afternoon. No complaints this am.   06/16/2018: Day 19 of 7+3. Day 2 of Flag-JENNIFER. Remains afebrile. Calcium remains low and have increased PO supplementation. Remains on vanc and aztreonam. Somewhat loopy feeling after receiving benadryl.   06/17/2018: Day 20 of 7+3. Day 3 of FLAG-JENNIFER. Multiple electrolyte abnormalities. New bilateral leg and feet swelling.   6/18/2018: Day 21 of 7+3 and Day 4 of FLAG JENNIFER. Tolerating well with some nausea controlled with Zofran. VSS, afebrile. ANC 1900 on Neupogen. Continues Vanc for staph epi bacteremia. Multiple electrolytes replaced today. Complains of edema to lower extremities, not improved after 20 of lasix yesterday. No sob or CP.   6/19/18: Day 22 of 7+3 and Day 5 of FLAG JENNIFER. VSS, afebrile, ANC 3900. Vanc trough elevated and dose adjusted this am. Lower extremity edema improved after 40 of lasix yesterday, net negative 300 cc I&O. Mild nausea, no abdominal pain or diarrhea. Poor appetite but no difficulty eating or taking pills.   6/20/18: Day 23 of 7+3 and Day 6 of FLAG JENNIFER. Patient complains of nausea and vomiting overnight and this am, especially when taking pills. Will add Zyprexa daily in addition to Zofran, compazine, and ativan PRN and will change meds to IV. 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 Afebrile.  No diarrhea, mouth sores or pain.  06/21/2018: Day 24 of 7+3 and Day 7 of FLAG JENNIFER. Nausea better controlled. Continued on Vanc.  6/22/2018: Day 25 of 7+3 and Day 8 of FLAG JENNIFER. Nausea improved some with Zyprexa but did have 1 episode of emesis overnight.continuing meds IV due to vomiting. Remains afebrile. ANC 0. Continues Vanc and Flagyl. Electrolytes replaced.   06/23/2018 : day 26 of 7+3 and Day 9 of FLAG JENNIFER.  Nausea persists,  worsened after taking pills so meds converted to IV.  Encouraged ambulation.  Continue with flagyl for leptotrichia goodfellowii.  RUQ US showed intrahepatic dilation, overall impression hepatic steatosis.  CBD 0.5cm.  No Gallbladder.    06/24/2018 : day 27 of 7+3 and Day 10 of FLAG JENNIFER.  Nausea persisting, able to tolerate some po pills.  Last day of flagyl (day 5).  Still pancytopenic. Appetite still low as po intake triggers nausea.  06/25/2018: day 28 of 7+3 and Day 11 of FLAG JENNIFER. Afebrile, ANC 0. Has completed Flagyl. Will decrease Vanc to 1000 mg q12, trough 19.9, will complete Vanc on 6/27. Liver enzymes stable on Midostaurin. VSS.   6/26/2018: day 29 of 7+3 and day 12 of FLAG JENNIFER. Liver enzymes improved and Midostaurin increased to full dose 50 mg bid. Nausea controlled, afebrile, VSS, NEON.  6/27/2018: day 30 of 7+3 and Day 13 of FLAG JENNIFER. Persistent nausea and emesis x 1 yesterday. Compazine changed to scheduled. Vanc ends today. Afebrile, VSS. Aggressive electrolyte replacement, low electrolytes possibly due to Midostaurin.   6/28/2018: day 31 of 7+3 and Day 14 of FLAG JENNIFER. Nausea improved with scheduled Compazine. Patient has agreed to start converting some IV meds to po. VSS, afebrile, electrolytes wnl today. Only complains of fatigue, new rash noted to upper back and chest and legs, papular rash mildly red.  6/29/2018: Day 32 of 7+3 and Day 15 of FLAG JENNIFER. Day 14 restaging bone marrow biopsy done at bedside today. Patient states nausea improved but she did have emesis last night after taking 9 pm pills. Rash improved. No mouth sores, diarrhea or pain.   7/2/2018: Day 35 of 7+3 and Day 18 of FLAG JENNIFER. Restaging BM bx results pending. Fluid overload over the weekend. Chest x-ray with pulmonary edema. Os sats down to 88%. Placed on O2 at 2 L NC, off O2 this am. Received lasix. Net negative 2.7 L today. 1 episode of nausea, no emesis. Reports anxiety relieved with PRN meds. Electrolytes improved,  afebrile, VSS.   7/3/2018: Day 36 of 7+3 and Day 19 of FLAG JENNIFER. Restaging Bm bx with GABRIEL. Cardiology consulted for abnormal echo, EF 30% with pulmonary HTN and severe L atrial enlargement. EKG with T wave abnormality, possible anterolateral ischemia and prolonged QTc of 530. Medications adjusted. Nausea controlled, no diarrhea. Electrolytes improved. On O2 as needed.   07/04/2018 : Day 37 of 7+3 and Day 20 of FLAG JENNIFER Diarrhea in AM, watery, no associated abdominal pain, nausea, or vomiting.    07/05/2018: Day 38 of 7+3 ane Day 21 of FLAG JENNIFER. No CP or SOB, cardiology following. On RA. All meds changed to po, denies nausea or vomiting and no diarrhea. VSS, afebrile.   7/6/2018: Day 22 of FLAG JENNIFER. Continues to tolerate po meds with no nausea. Afebrile. Awaiting count recovery for discharge.  7/7/2018-/8/2018: Day 40/41 of 7+3, Day 23/24 of FLAG JENNIFER.  Improving clinically.  Started on mag oxide per patient request.  Labs showing signs of ANC recovery.  7/9/2018: Day 25 FLAG JENNIFER, , continues Neupogen. Received platelets today. Afebrile, VSS. Tolerating all oral meds with no nausea or vomiting. Only complains of fatigue.   7/10/2018: Day 26 FLAG JENNIFER,  today. Had 2 episodes of vomiting and diarrhea last night. Feeling better. Afebrile, VSS.   7/11/2018: Day 27 FLAG JENNIFER,  today. No vomiting or diarrhea overnight and no nausea. Afebrile, VSS. Complains of back pain (bone pain) suspect due to count recovery. Relieved with oxy IR and Zyrtec started.  07/12/2018: Day 28 flag jennifer, engrafted today with ANC of 730. Back pain improved with zyrtec. Scheduled for IT chemo tomorrow at 1:30 pm and discharge home thereafter. Will likely need plt transfusion prior to IT chemo tomorrow   7/13/2018: Day 29 of FLAG JENNIFER. ANC up to 1300 today. Transfusing platelets today prior to LP for IT chemo. Patient continues to complain of bone pain, mostly to back and legs. No nausea, diarrhea, sob. Afebrile and VSS.    --hospitalized 7/23-7/25 for C.diff diarrhea, neutropenic fever.  While inpatient she developed pink blisters on her palm that were concerning for relapse.  --palm biopsy on 7/26/18 for suspicion of leukemia cutis was also negative for sign of relapse  --bone marrow biopsy 7/31/18 was negative for signs of relapse   --Admitted on 8/14/18 for HiDAC#1  --Admitted on 9/11/18 for HiDAC#2  --Admitted on 10/16/18 for HiDAC#3  --Post consolidation bone marrow biopsy on 12/27/2018: AML FISH (BM) Interpretation: The result is within normal limits for the AML FISH panel. No evidence of residual disease.  --Bone Marrow Biopsy on 5/9/19 NO MORPHOLOGIC EVIDENCE OF RESIDUAL ACUTE MYELOID LEUKEMIA. RECOMMEND NGS STUDY TO RULE OUT RESIDUAL DISEASE. deletion 20q  --Bone Marrow Biopsy on 9/23/2020: CELLULARITY=40-50%, TRILINEAGE HEMATOPOIETIC ACTIVITY (M/E= 0.6:1). ERYTHROID HYPERPLASIA, DYSERYTHROPOIESIS, AND INCREASED BLAST (11%). SEE COMMENT.  FOCAL GRADE 1 RETICULAR FIBROSIS.    Interval History:   Mrs. Wilkes and her daughter are here to discuss her recent bone marrow biopsy. She has not been feeling well for about a month and labs have trended down. She has not seen an increase infections, but feels like something is wrong.  Denies fevers/chills, night sweats, lymphadenopathy, bleeding/bruising.      Past Medical History:   Past Medical History:   Diagnosis Date    Cancer 03/2018    AML    CHF (congestive heart failure)     Diarrhea 9/14/2018    Encounter for blood transfusion        Current Medications:   Current Outpatient Medications   Medication Sig    ALPRAZolam (XANAX) 0.5 MG tablet Take 1 tablet (0.5 mg total) by mouth nightly as needed for Insomnia or Anxiety.    diphenhydrAMINE (BENADRYL) 50 MG capsule Take 50 mg by mouth nightly as needed for Itching or Insomnia.    escitalopram oxalate (LEXAPRO) 20 MG tablet     famotidine (PEPCID) 40 MG tablet Take 1 tablet (40 mg total) by mouth nightly.    ibuprofen  (ADVIL,MOTRIN) 600 MG tablet Take 1 tablet (600 mg total) by mouth every 6 (six) hours as needed (left ear/jaw pain).    metoprolol succinate (TOPROL-XL) 25 MG 24 hr tablet TAKE ONE TABLET BY MOUTH ONCE DAILY    omeprazole (PRILOSEC) 40 MG capsule Take 1 capsule (40 mg total) by mouth before breakfast. Take on empty stomach 30-60 minutes before meal    oxyCODONE-acetaminophen (PERCOCET) 7.5-325 mg per tablet Take 1 tablet by mouth every 4 (four) hours as needed for Pain.    walker (ULTRA-LIGHT ROLLATOR) Misc 1 Units by Misc.(Non-Drug; Combo Route) route once daily.    ALPRAZolam (XANAX) 1 MG tablet Take 0.5 tablets (0.5 mg total) by mouth nightly as needed for Insomnia. (Patient not taking: Reported on 9/30/2020)    clotrimazole (MYCELEX) 10 mg sultana     famotidine (PEPCID) 20 MG tablet     LORazepam (ATIVAN) 0.5 MG tablet Take 1 tablet (0.5 mg total) by mouth every 6 (six) hours as needed (nausea). (Patient not taking: Reported on 9/30/2020)    LORazepam (ATIVAN) 1 MG tablet Take 1 tablet (1 mg total) by mouth 2 (two) times daily. (Patient not taking: Reported on 9/30/2020)    ofloxacin (FLOXIN) 0.3 % otic solution Place 5 drops into the left ear once daily. (Patient not taking: Reported on 9/30/2020)    omeprazole (PRILOSEC OTC) 20 MG tablet Take 20 mg by mouth every morning.    ondansetron (ZOFRAN) 8 MG tablet     predniSONE (DELTASONE) 10 MG tablet TAKE ONE TABLET BY MOUTH ONCE DAILY AS NEEDED FOR ARTHRITIS FLARE (Patient not taking: Reported on 9/30/2020)    predniSONE (DELTASONE) 20 MG tablet Take 1 tablet (20 mg total) by mouth once daily. (Patient not taking: Reported on 9/30/2020)    traMADol (ULTRAM) 50 mg tablet Take 1 tablet (50 mg total) by mouth every 6 (six) hours as needed for Pain. (Patient not taking: Reported on 9/30/2020)     No current facility-administered medications for this visit.      ALLERGIES:   Review of patient's allergies indicates:   Allergen Reactions    Methotrexate  analogues      Elevated Liver Enzyme    Bactrim [sulfamethoxazole-trimethoprim] Nausea And Vomiting and Rash       Review of Systems:     Review of Systems   Constitutional: Positive for appetite change and fatigue. Negative for chills, diaphoresis, fever and unexpected weight change.   HENT:   Negative for hearing loss, mouth sores, nosebleeds, sore throat, trouble swallowing and voice change.    Eyes: Negative for eye problems and icterus.   Respiratory: Negative for chest tightness, cough, hemoptysis, shortness of breath and wheezing.    Cardiovascular: Negative for chest pain, leg swelling and palpitations.   Gastrointestinal: Negative for abdominal distention, abdominal pain, blood in stool, diarrhea, nausea and vomiting.   Endocrine: Negative for hot flashes.   Genitourinary: Negative for bladder incontinence, difficulty urinating, dysuria and hematuria.    Musculoskeletal: Positive for arthralgias and myalgias. Negative for back pain, flank pain, gait problem, neck pain and neck stiffness.   Skin: Negative for itching, rash and wound.   Neurological: Negative for dizziness, extremity weakness, gait problem, headaches, numbness, seizures and speech difficulty.   Hematological: Negative for adenopathy. Does not bruise/bleed easily.   Psychiatric/Behavioral: Negative for confusion, depression and sleep disturbance. The patient is nervous/anxious.         Physical Exam:     Vitals:    09/30/20 1424   BP: (!) 124/58   Pulse: 91   Resp: 20   Temp: 98.2 °F (36.8 °C)       Physical Exam  Constitutional:       General: She is not in acute distress.     Appearance: She is well-developed. She is not diaphoretic.   HENT:      Head: Normocephalic and atraumatic.      Mouth/Throat:      Pharynx: No oropharyngeal exudate.   Eyes:      Conjunctiva/sclera: Conjunctivae normal.      Pupils: Pupils are equal, round, and reactive to light.   Neck:      Musculoskeletal: Normal range of motion and neck supple.      Thyroid: No  thyromegaly.      Vascular: No JVD.      Trachea: No tracheal deviation.   Cardiovascular:      Rate and Rhythm: Normal rate and regular rhythm.      Heart sounds: Normal heart sounds. No murmur. No friction rub.   Pulmonary:      Effort: Pulmonary effort is normal. No respiratory distress.      Breath sounds: Normal breath sounds. No stridor. No wheezing or rales.   Chest:      Chest wall: No tenderness.   Abdominal:      General: Bowel sounds are normal. There is no distension.      Palpations: Abdomen is soft.      Tenderness: There is no abdominal tenderness. There is no guarding or rebound.   Musculoskeletal: Normal range of motion.         General: No tenderness or deformity.   Skin:     General: Skin is warm and dry.      Capillary Refill: Capillary refill takes less than 2 seconds.      Coloration: Skin is not pale.      Findings: No erythema or rash.   Neurological:      Mental Status: She is alert and oriented to person, place, and time.      Cranial Nerves: No cranial nerve deficit.      Sensory: No sensory deficit.      Motor: No abnormal muscle tone.      Coordination: Coordination normal.      Deep Tendon Reflexes: Reflexes normal.   Psychiatric:         Behavior: Behavior normal.         Thought Content: Thought content normal.         Judgment: Judgment normal.         ECOG Performance Status: (foot note - ECOG PS provided by Eastern Cooperative Oncology Group) 1 - Symptomatic but completely ambulatory    Karnofsky Performance Score:  80%- Normal Activity with Effort: Some Symptoms of Disease    Labs:   Lab Results   Component Value Date    WBC 1.98 (LL) 09/23/2020    HGB 9.8 (L) 09/23/2020    HCT 31.2 (L) 09/23/2020    PLT 90 (L) 09/23/2020    ALT 12 09/23/2020    AST 14 09/23/2020     09/23/2020    K 4.6 09/23/2020     09/23/2020    CREATININE 1.2 09/23/2020    BUN 12 09/23/2020    CO2 26 09/23/2020    TSH 1.913 10/17/2019    INR 1.1 10/16/2019       Imaging: previous imaging has been  reviewed    Assessment and Plan:     Mrs. Mac is a pleasant 70 year old female with recently diagnosed AML.      Acute Monocytic Leukemia  -- Admitted from [facility] on 5/25/18 early AM with WBC around 100s, for suspected new non-M3 AML  --lo res HLA typing on 5/26/18 and hi res on 5/27/18 done in anticipation of possible need for future transplant   --Pt with two daughters, two full sisters (in their mid 60s, one with brain aneurysm hx, other one without any medical issues), and one full brother (59 y/o healthy).  --NPM1 +, CEBPA (-), FLT3 (+).  On Midostaurin 50 mg PO BID until Day 14 (held starting 6/8 to 6/11). Stopped when FLAG JENNIFER re-induction started. Restarted Midostaurin at 25 mg bid on day 8 of FLAG JENNIFER induction (6/22), increase to full dose 50 mg bid (6/26) as improvement in liver enzymes. Stopped 7/3/18 due to abnormal cardiac echo.  --day 14 bone marrow biopsy completed 6/11 showing persistent disease with 15% CD 34 positive blasts  --Day 60 of FLAG-JENNIFER, tolerating without difficulty except nausea/vomiting  --day 14 restaging bone marrow biopsy done 6/29 without complication, results with no evidence of AML, FLT 3 negative, will need repeat marrow at count recovery outpatient   --recovery marrow showed no evidence of disease  --HiDAC #1 on 8/14/18, HiDAC#2 on 9/11/18  --Cycle 3 delayed 1 week due to pancytopenia. Started on 10/16/18  --recovery bone marrow showed no signs of residual leukemia  --Will restart Neutropenia prophylaxis  --Bone marrow biopsy done last week reveals early relapse. 11% blasts. We discussed the possibility of re-induction, but she is adamantly against. She does not want to be admitted to the hospital. I propose we re-challenge her with single agent Gilteritinib 120mg.  --We will do twice weekly labs and EKG at [facility] at the beginning of therapy  --Return to clinic about 4 weeks after starting therapy    Left sided jaw dysfunction  --MRI revealed degenerative  changes  --Seen by ENT    Thrombocytopenia  --Previously in the 70-90 range  --90 today    Suspected Leukemia Cutis  --pathology from dermatology confirmed inflammatory changes  --now resolved with topical steroid cream    Abnormal Liver Function Tests  --ALT and AST and Alk phos have again increased substantially since re-starting midostaurin.   --midostaurin started day 8 at 25 mg bid, monitoring liver enzymes closely and improved. Increasing Midostaurin to 50 mg bid 6/26. Stopped Midostaurin (7/3) due to new diagnosis of systolic heart failure EF 35%.  --6/22/18 liver US showing mild intrahepatic dilation, otherwise impression of hepatic steatosis.  Will hold off of MRCP at this time.    --permanently discontinued midostaurin     Chemotherapy induced cardiomyopathy  --cardiology follow-up as outpatient in 3 months  --repeat 2D echo on 6/15 with preserved EF of 60%.  However 7/2/18 echo with reduced EF 30-35%  --Echo on 8/15/18 was within normal range EF of 60-65%    Rheumatologic issues  --Previously on steroids and methotrexate  --working with [facility] rheumatology    30 minutes were spent face to face with the patient and her family to discuss the disease, natural history, treatment options and survival statistics. I have provided the patient with an opportunity to ask questions and have all questions answered to her satisfaction.     she will return to the clinic in 4 weeks, but knows to call in the interim if symptoms change or should a problem arise.      Laquita Troy MD  Hematology and Medical Oncology  Bone Marrow Transplant  [facility]

## 2020-10-01 ENCOUNTER — TELEPHONE (OUTPATIENT)
Dept: PHARMACY | Facility: CLINIC | Age: 70
End: 2020-10-01

## 2020-10-01 LAB
ANNOTATION COMMENT IMP: NORMAL
COMMENT: NORMAL
FINAL PATHOLOGIC DIAGNOSIS: NORMAL
GROSS: NORMAL
Lab: NORMAL
MICROSCOPIC EXAM: NORMAL
NGS CLINCIAL TRIALS: NORMAL
NGS INDICATION OF TEST: NORMAL
NGS INTERPRETATION: NORMAL
NGS ONCOHEME PANEL GENE LIST: NORMAL
NGS PATHOGENIC MUTATIONS DETECTED: NORMAL
NGS REVIEWED BY:: NORMAL
NGS VARIANTS OF UNKNOWN SIGNIFICANCE: NORMAL
NGSHM RESULT, BONE MARROW: NORMAL
REF LAB TEST METHOD: NORMAL
SPECIMEN SOURCE: NORMAL
SUPPLEMENTAL DIAGNOSIS: NORMAL
TEST PERFORMANCE INFO SPEC: NORMAL

## 2020-10-01 RX ORDER — FLUCONAZOLE 200 MG/1
200 TABLET ORAL DAILY
Qty: 30 TABLET | Refills: 6 | Status: SHIPPED | OUTPATIENT
Start: 2020-10-01 | End: 2020-10-31

## 2020-10-01 RX ORDER — ACYCLOVIR 400 MG/1
400 TABLET ORAL 2 TIMES DAILY
Qty: 240 TABLET | Refills: 0 | Status: SHIPPED | OUTPATIENT
Start: 2020-10-01 | End: 2020-11-09

## 2020-10-01 RX ORDER — ONDANSETRON 4 MG/1
4 TABLET, FILM COATED ORAL DAILY PRN
Qty: 30 TABLET | Refills: 2 | Status: ON HOLD | OUTPATIENT
Start: 2020-10-01 | End: 2020-10-28 | Stop reason: HOSPADM

## 2020-10-01 NOTE — TELEPHONE ENCOUNTER
Informed Patient  that [facility] received prescription for Xospata and prior authorization is required.  [facility] will be back in touch once insurance determination is received.

## 2020-10-02 ENCOUNTER — DOCUMENTATION ONLY (OUTPATIENT)
Dept: PHARMACY | Facility: HOSPITAL | Age: 70
End: 2020-10-02

## 2020-10-02 DIAGNOSIS — R79.1 ABNORMAL COAGULATION PROFILE: ICD-10-CM

## 2020-10-02 DIAGNOSIS — C92.02 ACUTE MYELOID LEUKEMIA IN RELAPSE: Primary | ICD-10-CM

## 2020-10-04 ENCOUNTER — PATIENT MESSAGE (OUTPATIENT)
Dept: HEMATOLOGY/ONCOLOGY | Facility: CLINIC | Age: 70
End: 2020-10-04

## 2020-10-05 ENCOUNTER — TELEPHONE (OUTPATIENT)
Dept: PHARMACY | Facility: CLINIC | Age: 70
End: 2020-10-05

## 2020-10-05 ENCOUNTER — TELEPHONE (OUTPATIENT)
Dept: HEMATOLOGY/ONCOLOGY | Facility: CLINIC | Age: 70
End: 2020-10-05

## 2020-10-05 DIAGNOSIS — C92.02 ACUTE MYELOID LEUKEMIA IN RELAPSE: Primary | ICD-10-CM

## 2020-10-05 NOTE — TELEPHONE ENCOUNTER
Informed Patient  that [facility] received prescription for Venclexta and prior authorization is required.  [facility] will be back in touch once insurance determination is received.

## 2020-10-05 NOTE — TELEPHONE ENCOUNTER
I spoke to Mrs. Mac this afternoon regarding her next generation sequencing of the bone marrow biopsy. On this marrow a FLT-3 mutation was not identified, making Gilteritinib not the ideal choice of therapy. We will proceed with Decitabine 5 days and Venetoclax. Decitabine should be administered at [facility] given it's proximity to her home.

## 2020-10-06 ENCOUNTER — HOSPITAL ENCOUNTER (OUTPATIENT)
Dept: PREADMISSION TESTING | Facility: HOSPITAL | Age: 70
Discharge: HOME | End: 2020-10-06
Attending: INTERNAL MEDICINE
Payer: MEDICARE

## 2020-10-06 ENCOUNTER — CLINICAL SUPPORT (OUTPATIENT)
Dept: CARDIOLOGY | Facility: HOSPITAL | Age: 70
End: 2020-10-06
Attending: INTERNAL MEDICINE
Payer: MEDICARE

## 2020-10-06 VITALS — HEIGHT: 62 IN | BODY MASS INDEX: 33.49 KG/M2 | WEIGHT: 182 LBS

## 2020-10-06 DIAGNOSIS — C92.02 ACUTE MYELOID LEUKEMIA IN RELAPSE: ICD-10-CM

## 2020-10-06 PROCEDURE — 93306 TTE W/DOPPLER COMPLETE: CPT | Mod: 26,,, | Performed by: INTERNAL MEDICINE

## 2020-10-06 PROCEDURE — 93306 ECHO (CUPID ONLY): ICD-10-PCS | Mod: 26,,, | Performed by: INTERNAL MEDICINE

## 2020-10-06 PROCEDURE — 93005 ELECTROCARDIOGRAM TRACING: CPT | Performed by: INTERNAL MEDICINE

## 2020-10-06 PROCEDURE — 93306 TTE W/DOPPLER COMPLETE: CPT

## 2020-10-06 PROCEDURE — 93010 EKG 12-LEAD: ICD-10-PCS | Mod: ,,, | Performed by: INTERNAL MEDICINE

## 2020-10-06 PROCEDURE — 93010 ELECTROCARDIOGRAM REPORT: CPT | Mod: ,,, | Performed by: INTERNAL MEDICINE

## 2020-10-07 ENCOUNTER — PATIENT MESSAGE (OUTPATIENT)
Dept: HEMATOLOGY/ONCOLOGY | Facility: CLINIC | Age: 70
End: 2020-10-07

## 2020-10-07 NOTE — TELEPHONE ENCOUNTER
Make sure to verify which location is better for her.     As for labs lets get a cbc,cmp, type and screen

## 2020-10-08 ENCOUNTER — OFFICE VISIT (OUTPATIENT)
Dept: HEMATOLOGY/ONCOLOGY | Facility: CLINIC | Age: 70
End: 2020-10-08
Payer: MEDICARE

## 2020-10-08 DIAGNOSIS — T45.1X5A CINV (CHEMOTHERAPY-INDUCED NAUSEA AND VOMITING): ICD-10-CM

## 2020-10-08 DIAGNOSIS — G47.00 INSOMNIA, UNSPECIFIED TYPE: ICD-10-CM

## 2020-10-08 DIAGNOSIS — C92.02 ACUTE MYELOID LEUKEMIA IN RELAPSE: Primary | ICD-10-CM

## 2020-10-08 DIAGNOSIS — F41.8 DEPRESSION WITH ANXIETY: ICD-10-CM

## 2020-10-08 DIAGNOSIS — R11.2 CINV (CHEMOTHERAPY-INDUCED NAUSEA AND VOMITING): ICD-10-CM

## 2020-10-08 DIAGNOSIS — R79.89 ELEVATED LFTS: ICD-10-CM

## 2020-10-08 PROCEDURE — 99215 PR OFFICE/OUTPT VISIT, EST, LEVL V, 40-54 MIN: ICD-10-PCS | Mod: 95,,, | Performed by: INTERNAL MEDICINE

## 2020-10-08 PROCEDURE — 99215 OFFICE O/P EST HI 40 MIN: CPT | Mod: 95,,, | Performed by: INTERNAL MEDICINE

## 2020-10-08 NOTE — PROGRESS NOTES
Hematology and Medical Oncology   Follow Up Note     10/08/2020    Primary Oncologic Diagnosis: AML, FLT 3 + and NPMN1+    Telemedicine Documentation:  The patient location is: home  The chief complaint leading to consultation is: Acute leukemia treatment planning    Visit type: audiovisual    Face to Face time with patient: 25    30 minutes of total time spent on the encounter, which includes face to face time and non-face to face time preparing to see the patient (eg, review of tests), Obtaining and/or reviewing separately obtained history, Documenting clinical information in the electronic or other health record, Independently interpreting results (not separately reported) and communicating results to the patient/family/caregiver, or Care coordination (not separately reported).         Each patient to whom he or she provides medical services by telemedicine is:  (1) informed of the relationship between the physician and patient and the respective role of any other health care provider with respect to management of the patient; and (2) notified that he or she may decline to receive medical services by telemedicine and may withdraw from such care at any time.      History of Present Ilness:   Sabrina Badillo) is a pleasant 70 y.o.female presents to clinic following 2 induction therapies for AML. She was in the hospital roughly 50 days to receive 7+3 and then FLAG JENNIFER.    Oncology History:   68 y.o. female admitted overnight for possible new AML. Came in from OSH with elevated WBC of 100.   05/25/2018: Pt is now on hydrea 2 grams BID, allopurinol 300 mg daily, and IVF. Pt may need rasburicase this weekend. She will undergo a BM bx and aspiration today. She is an induction candidate and will not be screened for research trials. She has anxiety for which she usually takes xanax, this was re-started.   05/26/2018 PICC placed. Reports she slept well last night. Anxiety improved with prn xanax. EF 65%, HIV and Hep  panel negative. 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  6/12/2018: Day 15 of 7+3 induction, restaging BM bx done yesterday. On Midostaurin. Fever yesterday and BC with gram + cocci in clusters. On cefepime day 2 and will start Vanc today. Labial mucositis improving.   6/13/2018: Day 16 7+3. BC with staph aureus, identification pending. Surveillance blood cultures done today. Afebrile x 48 hours. On cefepime and Vanc. Labial mucositis resolved. No complaints of pain. Nausea controlled. No diarrhea. Primary complaint is fatigue.   6/14/20018: Day 17 of 7+3. Day 14 bone marrow results pending. BC with staph epidermis only sensitive to Vancomycin. Vanc day 3 and cefepime day 4. Vanc trough 12.2 last night. BP remains low but stable. Afebrile x 72 hours.   6/15/2018: Day 18 of 7+3. Day 14 bone marrow biopsy shows persistent disease with 15% blasts. Discussion with patient regarding treatment and she is deciding if she wants to proceed with re-induction vs outpatient maintenance. Temp of 100.4 overnight, unsustained. Day 5 Cefepime and Day 4 of Vanc for staph epidermis. Repeat cultures NGTD. BP improved. Electrolytes (mag, phos, K and calcium) replaced aggressively this am and will repeat labs this afternoon. No complaints this am.   06/16/2018: Day 19 of 7+3. Day 2 of Flag-JENNIFER. Remains afebrile. Calcium remains low and have increased PO supplementation. Remains on vanc and aztreonam. Somewhat loopy feeling after receiving benadryl.   06/17/2018: Day 20 of 7+3. Day 3 of FLAG-JENNIFER. Multiple electrolyte abnormalities. New bilateral leg and feet swelling.   6/18/2018: Day 21 of 7+3 and Day 4 of FLAG JENNIFER. Tolerating well with some nausea controlled with Zofran. VSS, afebrile. ANC 1900 on Neupogen. Continues Vanc for staph epi bacteremia. Multiple electrolytes replaced today. Complains of edema to lower extremities, not improved after 20 of lasix yesterday. No sob or CP.   6/19/18: Day 22 of 7+3 and Day 5 of FLAG JENNIFER. VSS,  afebrile, ANC 3900. Vanc trough elevated and dose adjusted this am. Lower extremity edema improved after 40 of lasix yesterday, net negative 300 cc I&O. Mild nausea, no abdominal pain or diarrhea. Poor appetite but no difficulty eating or taking pills.   6/20/18: Day 23 of 7+3 and Day 6 of FLAG JENNIFER. Patient complains of nausea and vomiting overnight and this am, especially when taking pills. Will add Zyprexa daily in addition to Zofran, compazine, and ativan PRN and will change meds to IV. 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 Afebrile.  No diarrhea, mouth sores or pain.  06/21/2018: Day 24 of 7+3 and Day 7 of FLAG JENNIFER. Nausea better controlled. Continued on Vanc.  6/22/2018: Day 25 of 7+3 and Day 8 of FLAG JENNIFER. Nausea improved some with Zyprexa but did have 1 episode of emesis overnight.continuing meds IV due to vomiting. Remains afebrile. ANC 0. Continues Vanc and Flagyl. Electrolytes replaced.   06/23/2018 : day 26 of 7+3 and Day 9 of FLAG JENNIFER.  Nausea persists, worsened after taking pills so meds converted to IV.  Encouraged ambulation.  Continue with flagyl for leptotrichia goodfellowii.  RUQ US showed intrahepatic dilation, overall impression hepatic steatosis.  CBD 0.5cm.  No Gallbladder.    06/24/2018 : day 27 of 7+3 and Day 10 of FLAG JENNIFER.  Nausea persisting, able to tolerate some po pills.  Last day of flagyl (day 5).  Still pancytopenic. Appetite still low as po intake triggers nausea.  06/25/2018: day 28 of 7+3 and Day 11 of FLAG JENNIFER. Afebrile, ANC 0. Has completed Flagyl. Will decrease Vanc to 1000 mg q12, trough 19.9, will complete Vanc on 6/27. Liver enzymes stable on Midostaurin. VSS.   6/26/2018: day 29 of 7+3 and day 12 of FLAG JENNIFER. Liver enzymes improved and Midostaurin increased to full dose 50 mg bid. Nausea controlled, afebrile, VSS, NEON.  6/27/2018: day 30 of 7+3 and Day 13 of FLAG JENNIFER. Persistent nausea and emesis x 1  yesterday. Compazine changed to scheduled. Vanc ends today. Afebrile, VSS. Aggressive electrolyte replacement, low electrolytes possibly due to Midostaurin.   6/28/2018: day 31 of 7+3 and Day 14 of FLAG JENNIFER. Nausea improved with scheduled Compazine. Patient has agreed to start converting some IV meds to po. VSS, afebrile, electrolytes wnl today. Only complains of fatigue, new rash noted to upper back and chest and legs, papular rash mildly red.  6/29/2018: Day 32 of 7+3 and Day 15 of FLAG JENNIFER. Day 14 restaging bone marrow biopsy done at bedside today. Patient states nausea improved but she did have emesis last night after taking 9 pm pills. Rash improved. No mouth sores, diarrhea or pain.   7/2/2018: Day 35 of 7+3 and Day 18 of FLAG JENNIFER. Restaging BM bx results pending. Fluid overload over the weekend. Chest x-ray with pulmonary edema. Os sats down to 88%. Placed on O2 at 2 L NC, off O2 this am. Received lasix. Net negative 2.7 L today. 1 episode of nausea, no emesis. Reports anxiety relieved with PRN meds. Electrolytes improved, afebrile, VSS.   7/3/2018: Day 36 of 7+3 and Day 19 of FLAG JENNIFER. Restaging Bm bx with GABRIEL. Cardiology consulted for abnormal echo, EF 30% with pulmonary HTN and severe L atrial enlargement. EKG with T wave abnormality, possible anterolateral ischemia and prolonged QTc of 530. Medications adjusted. Nausea controlled, no diarrhea. Electrolytes improved. On O2 as needed.   07/04/2018 : Day 37 of 7+3 and Day 20 of FLAG JENNIFER Diarrhea in AM, watery, no associated abdominal pain, nausea, or vomiting.    07/05/2018: Day 38 of 7+3 ane Day 21 of FLAG JENNIFER. No CP or SOB, cardiology following. On RA. All meds changed to po, denies nausea or vomiting and no diarrhea. VSS, afebrile.   7/6/2018: Day 22 of FLAG JENNIFER. Continues to tolerate po meds with no nausea. Afebrile. Awaiting count recovery for discharge.  7/7/2018-/8/2018: Day 40/41 of 7+3, Day 23/24 of FLAG JENNIFER.  Improving clinically.  Started on mag  oxide per patient request.  Labs showing signs of ANC recovery.  7/9/2018: Day 25 FLAG JENNIFER, , continues Neupogen. Received platelets today. Afebrile, VSS. Tolerating all oral meds with no nausea or vomiting. Only complains of fatigue.   7/10/2018: Day 26 FLAG JENNIFER,  today. Had 2 episodes of vomiting and diarrhea last night. Feeling better. Afebrile, VSS.   7/11/2018: Day 27 FLAG JENNIFER,  today. No vomiting or diarrhea overnight and no nausea. Afebrile, VSS. Complains of back pain (bone pain) suspect due to count recovery. Relieved with oxy IR and Zyrtec started.  07/12/2018: Day 28 flag jennifer, engrafted today with ANC of 730. Back pain improved with zyrtec. Scheduled for IT chemo tomorrow at 1:30 pm and discharge home thereafter. Will likely need plt transfusion prior to IT chemo tomorrow   7/13/2018: Day 29 of FLAG JENNIFER. ANC up to 1300 today. Transfusing platelets today prior to LP for IT chemo. Patient continues to complain of bone pain, mostly to back and legs. No nausea, diarrhea, sob. Afebrile and VSS.   --hospitalized 7/23-7/25 for C.diff diarrhea, neutropenic fever.  While inpatient she developed pink blisters on her palm that were concerning for relapse.  --palm biopsy on 7/26/18 for suspicion of leukemia cutis was also negative for sign of relapse  --bone marrow biopsy 7/31/18 was negative for signs of relapse   --Admitted on 8/14/18 for HiDAC#1  --Admitted on 9/11/18 for HiDAC#2  --Admitted on 10/16/18 for HiDAC#3  --Post consolidation bone marrow biopsy on 12/27/2018: AML FISH (BM) Interpretation: The result is within normal limits for the AML FISH panel. No evidence of residual disease.  --Bone Marrow Biopsy on 5/9/19 NO MORPHOLOGIC EVIDENCE OF RESIDUAL ACUTE MYELOID LEUKEMIA. RECOMMEND NGS STUDY TO RULE OUT RESIDUAL DISEASE. deletion 20q  --Bone Marrow Biopsy on 9/23/2020: CELLULARITY=40-50%, TRILINEAGE HEMATOPOIETIC ACTIVITY (M/E= 0.6:1). ERYTHROID HYPERPLASIA, DYSERYTHROPOIESIS, AND  INCREASED BLAST (11%). SEE COMMENT.  FOCAL GRADE 1 RETICULAR FIBROSIS.    Interval History:   Mrs. Wilkes presents virtually for consent signing of decitabine and venetoclax.  She has not been feeling well for about a month and labs have trended down. She has not seen an increase infections, but feels like something is wrong.  Denies fevers/chills, night sweats, lymphadenopathy, bleeding/bruising.    Since returning home from her daughter's house she continues to require additional rest and sleep.      Past Medical History:   Past Medical History:   Diagnosis Date    Cancer 03/2018    AML    CHF (congestive heart failure)     Diarrhea 9/14/2018    Encounter for blood transfusion        Current Medications:   Current Outpatient Medications   Medication Sig    acyclovir (ZOVIRAX) 400 MG tablet Take 1 tablet (400 mg total) by mouth 2 (two) times daily.    ALPRAZolam (XANAX) 0.5 MG tablet Take 1 tablet (0.5 mg total) by mouth nightly as needed for Insomnia or Anxiety.    ALPRAZolam (XANAX) 1 MG tablet Take 0.5 tablets (0.5 mg total) by mouth nightly as needed for Insomnia. (Patient not taking: Reported on 9/30/2020)    clotrimazole (MYCELEX) 10 mg sultana     diphenhydrAMINE (BENADRYL) 50 MG capsule Take 50 mg by mouth nightly as needed for Itching or Insomnia.    escitalopram oxalate (LEXAPRO) 20 MG tablet     famotidine (PEPCID) 20 MG tablet     famotidine (PEPCID) 40 MG tablet Take 1 tablet (40 mg total) by mouth nightly.    fluconazole (DIFLUCAN) 200 MG Tab Take 1 tablet (200 mg total) by mouth once daily.    ibuprofen (ADVIL,MOTRIN) 600 MG tablet Take 1 tablet (600 mg total) by mouth every 6 (six) hours as needed (left ear/jaw pain).    LEVAQUIN 500 mg tablet Take 1 tablet (500 mg total) by mouth once daily.    LORazepam (ATIVAN) 0.5 MG tablet Take 1 tablet (0.5 mg total) by mouth every 6 (six) hours as needed (nausea). (Patient not taking: Reported on 9/30/2020)    LORazepam (ATIVAN) 1 MG tablet  Take 1 tablet (1 mg total) by mouth 2 (two) times daily. (Patient not taking: Reported on 9/30/2020)    metoprolol succinate (TOPROL-XL) 25 MG 24 hr tablet TAKE ONE TABLET BY MOUTH ONCE DAILY    ofloxacin (FLOXIN) 0.3 % otic solution Place 5 drops into the left ear once daily. (Patient not taking: Reported on 9/30/2020)    omeprazole (PRILOSEC OTC) 20 MG tablet Take 20 mg by mouth every morning.    omeprazole (PRILOSEC) 40 MG capsule Take 1 capsule (40 mg total) by mouth before breakfast. Take on empty stomach 30-60 minutes before meal    ondansetron (ZOFRAN) 4 MG tablet Take 1 tablet (4 mg total) by mouth daily as needed for Nausea.    ondansetron (ZOFRAN) 8 MG tablet     oxyCODONE-acetaminophen (PERCOCET) 7.5-325 mg per tablet Take 1 tablet by mouth every 4 (four) hours as needed for Pain.    predniSONE (DELTASONE) 10 MG tablet TAKE ONE TABLET BY MOUTH ONCE DAILY AS NEEDED FOR ARTHRITIS FLARE (Patient not taking: Reported on 9/30/2020)    predniSONE (DELTASONE) 20 MG tablet Take 1 tablet (20 mg total) by mouth once daily. (Patient not taking: Reported on 9/30/2020)    traMADol (ULTRAM) 50 mg tablet Take 1 tablet (50 mg total) by mouth every 6 (six) hours as needed for Pain. (Patient not taking: Reported on 9/30/2020)    venetoclax (VENCLEXTA) 100 mg Tab Take 4 tablets (400 mg) by mouth once daily.    walker (ULTRA-LIGHT ROLLATOR) Misc 1 Units by Misc.(Non-Drug; Combo Route) route once daily.     No current facility-administered medications for this visit.      ALLERGIES:   Review of patient's allergies indicates:   Allergen Reactions    Methotrexate analogues      Elevated Liver Enzyme    Bactrim [sulfamethoxazole-trimethoprim] Nausea And Vomiting and Rash       Review of Systems:     Review of Systems   Constitutional: Positive for appetite change and fatigue. Negative for chills, diaphoresis, fever and unexpected weight change.   HENT:   Negative for hearing loss, mouth sores, nosebleeds, sore  throat, trouble swallowing and voice change.    Eyes: Negative for eye problems and icterus.   Respiratory: Negative for chest tightness, cough, hemoptysis, shortness of breath and wheezing.    Cardiovascular: Negative for chest pain, leg swelling and palpitations.   Gastrointestinal: Negative for abdominal distention, abdominal pain, blood in stool, diarrhea, nausea and vomiting.   Endocrine: Negative for hot flashes.   Genitourinary: Negative for bladder incontinence, difficulty urinating, dysuria and hematuria.    Musculoskeletal: Positive for arthralgias and myalgias. Negative for back pain, flank pain, gait problem, neck pain and neck stiffness.   Skin: Negative for itching, rash and wound.   Neurological: Negative for dizziness, extremity weakness, gait problem, headaches, numbness, seizures and speech difficulty.   Hematological: Negative for adenopathy. Does not bruise/bleed easily.   Psychiatric/Behavioral: Negative for confusion, depression and sleep disturbance. The patient is nervous/anxious.         Physical Exam:   Limited secondary to virtual visit    ECOG Performance Status: (foot note - ECOG PS provided by Eastern Cooperative Oncology Group) 1 - Symptomatic but completely ambulatory    Karnofsky Performance Score:  80%- Normal Activity with Effort: Some Symptoms of Disease    Labs:   Lab Results   Component Value Date    WBC 1.98 (LL) 09/23/2020    HGB 9.8 (L) 09/23/2020    HCT 31.2 (L) 09/23/2020    PLT 90 (L) 09/23/2020    ALT 12 09/23/2020    AST 14 09/23/2020     09/23/2020    K 4.6 09/23/2020     09/23/2020    CREATININE 1.2 09/23/2020    BUN 12 09/23/2020    CO2 26 09/23/2020    TSH 1.913 10/17/2019    INR 1.1 10/16/2019       Imaging: previous imaging has been reviewed    Assessment and Plan:     Mrs. Mac is a pleasant 70 year old female with recently diagnosed AML.      Acute Monocytic Leukemia  -- Admitted from [facility] on 5/25/18 early AM with WBC around 100s, for  suspected new non-M3 AML  --lo res HLA typing on 5/26/18 and hi res on 5/27/18 done in anticipation of possible need for future transplant   --Pt with two daughters, two full sisters (in their mid 60s, one with brain aneurysm hx, other one without any medical issues), and one full brother (59 y/o healthy).  --NPM1 +, CEBPA (-), FLT3 (+).  On Midostaurin 50 mg PO BID until Day 14 (held starting 6/8 to 6/11). Stopped when FLAG JENNIFER re-induction started. Restarted Midostaurin at 25 mg bid on day 8 of FLAG JENNIFER induction (6/22), increase to full dose 50 mg bid (6/26) as improvement in liver enzymes. Stopped 7/3/18 due to abnormal cardiac echo.  --day 14 bone marrow biopsy completed 6/11 showing persistent disease with 15% CD 34 positive blasts  --Day 60 of FLAG-JENNFIER, tolerating without difficulty except nausea/vomiting  --day 14 restaging bone marrow biopsy done 6/29 without complication, results with no evidence of AML, FLT 3 negative, will need repeat marrow at count recovery outpatient   --recovery marrow showed no evidence of disease  --HiDAC #1 on 8/14/18, HiDAC#2 on 9/11/18  --Cycle 3 delayed 1 week due to pancytopenia. Started on 10/16/18  --recovery bone marrow showed no signs of residual leukemia  --Will restart Neutropenia prophylaxis  --Bone marrow biopsy done last week reveals early relapse. 11% blasts. We discussed the possibility of re-induction, but she is adamantly against. She does not want to be admitted to the hospital.  --Plan for single agent Gilteritinib 120mg has changed based on next gen sequencing that reports FLT-3 negative at this time  --Verbally consented to decitabine and venetoclax, to begin next week. Consent has be signed and uploaded into the media section.  --We will do twice weekly labs at [place]  --Return to clinic about 4 weeks after starting therapy    Left sided jaw dysfunction  --MRI revealed degenerative changes  --Seen by ENT    Thrombocytopenia  --Previously in the 70-90  range  --90 today    Suspected Leukemia Cutis  --pathology from dermatology confirmed inflammatory changes  --now resolved with topical steroid cream    Abnormal Liver Function Tests  --ALT and AST and Alk phos have again increased substantially since re-starting midostaurin.   --midostaurin started day 8 at 25 mg bid, monitoring liver enzymes closely and improved. Increasing Midostaurin to 50 mg bid 6/26. Stopped Midostaurin (7/3) due to new diagnosis of systolic heart failure EF 35%.  --6/22/18 liver US showing mild intrahepatic dilation, otherwise impression of hepatic steatosis.  Will hold off of MRCP at this time.    --permanently discontinued midostaurin     Chemotherapy induced cardiomyopathy  --cardiology follow-up as outpatient in 3 months  --repeat 2D echo on 6/15 with preserved EF of 60%.  However 7/2/18 echo with reduced EF 30-35%  --Echo on 8/15/18 was within normal range EF of 60-65%    Rheumatologic issues  --Previously on steroids and methotrexate  --working with [facility] rheumatology    30 minutes were spent face to face with the patient and her family to discuss the disease, natural history, treatment options and survival statistics. I have provided the patient with an opportunity to ask questions and have all questions answered to her satisfaction.     she will return to the clinic in 4 weeks, but knows to call in the interim if symptoms change or should a problem arise.      Laquita Troy MD  Hematology and Medical Oncology  Bone Marrow Transplant  [facility]

## 2020-10-08 NOTE — Clinical Note
1. Labs each Tuesday and Friday in [place]: cbc,cmp starting the week on 10/19, please schedule 4 weeks of labs  2. See me on 11/9  3. Decitabine x 5 days (at [facility]) starting 11/9

## 2020-10-09 LAB
AORTIC ROOT ANNULUS: 3.34 CM
AORTIC VALVE CUSP SEPERATION: 2.1 CM
AV INDEX (PROSTH): 0.93
AV MEAN GRADIENT: 3 MMHG
AV PEAK GRADIENT: 5 MMHG
AV VALVE AREA: 2.8 CM2
AV VELOCITY RATIO: 93.63
BSA FOR ECHO PROCEDURE: 1.9 M2
CV ECHO LV RWT: 0.5 CM
DOP CALC AO PEAK VEL: 1.09 M/S
DOP CALC AO VTI: 22.48 CM
DOP CALC LVOT AREA: 3 CM2
DOP CALC LVOT DIAMETER: 1.96 CM
DOP CALC LVOT PEAK VEL: 102.06 M/S
DOP CALC LVOT STROKE VOLUME: 62.85 CM3
DOP CALCLVOT PEAK VEL VTI: 20.84 CM
E WAVE DECELERATION TIME: 230.74 MSEC
E/A RATIO: 0.46
E/E' RATIO: 8.86 M/S
ECHO LV POSTERIOR WALL: 0.97 CM (ref 0.6–1.1)
FRACTIONAL SHORTENING: 28 % (ref 28–44)
INTERVENTRICULAR SEPTUM: 0.92 CM (ref 0.6–1.1)
IVRT: 132.6 MSEC
LEFT ATRIUM SIZE: 3.27 CM
LEFT INTERNAL DIMENSION IN SYSTOLE: 2.8 CM (ref 2.1–4)
LEFT VENTRICLE MASS INDEX: 61 G/M2
LEFT VENTRICULAR INTERNAL DIMENSION IN DIASTOLE: 3.9 CM (ref 3.5–6)
LEFT VENTRICULAR MASS: 112.74 G
LV LATERAL E/E' RATIO: 6.89 M/S
LV SEPTAL E/E' RATIO: 12.4 M/S
MV PEAK A VEL: 1.34 M/S
MV PEAK E VEL: 0.62 M/S
PISA TR MAX VEL: 1.92 M/S
PV PEAK VELOCITY: 89.22 CM/S
RA PRESSURE: 3 MMHG
RIGHT VENTRICULAR END-DIASTOLIC DIMENSION: 302 CM
TDI LATERAL: 0.09 M/S
TDI SEPTAL: 0.05 M/S
TDI: 0.07 M/S
TR MAX PG: 15 MMHG
TV REST PULMONARY ARTERY PRESSURE: 18 MMHG

## 2020-10-09 RX ORDER — HEPARIN 100 UNIT/ML
500 SYRINGE INTRAVENOUS
Status: CANCELLED | OUTPATIENT
Start: 2020-10-16

## 2020-10-09 RX ORDER — SODIUM CHLORIDE 0.9 % (FLUSH) 0.9 %
10 SYRINGE (ML) INJECTION
Status: CANCELLED | OUTPATIENT
Start: 2020-10-14

## 2020-10-09 RX ORDER — SODIUM CHLORIDE 0.9 % (FLUSH) 0.9 %
10 SYRINGE (ML) INJECTION
Status: CANCELLED | OUTPATIENT
Start: 2020-10-16

## 2020-10-09 RX ORDER — HEPARIN 100 UNIT/ML
500 SYRINGE INTRAVENOUS
Status: CANCELLED | OUTPATIENT
Start: 2020-10-15

## 2020-10-09 RX ORDER — SODIUM CHLORIDE 0.9 % (FLUSH) 0.9 %
10 SYRINGE (ML) INJECTION
Status: CANCELLED | OUTPATIENT
Start: 2020-10-13

## 2020-10-09 RX ORDER — SODIUM CHLORIDE 0.9 % (FLUSH) 0.9 %
10 SYRINGE (ML) INJECTION
Status: CANCELLED | OUTPATIENT
Start: 2020-10-12

## 2020-10-09 RX ORDER — HEPARIN 100 UNIT/ML
500 SYRINGE INTRAVENOUS
Status: CANCELLED | OUTPATIENT
Start: 2020-10-14

## 2020-10-09 RX ORDER — HEPARIN 100 UNIT/ML
500 SYRINGE INTRAVENOUS
Status: CANCELLED | OUTPATIENT
Start: 2020-10-12

## 2020-10-09 RX ORDER — SODIUM CHLORIDE 0.9 % (FLUSH) 0.9 %
10 SYRINGE (ML) INJECTION
Status: CANCELLED | OUTPATIENT
Start: 2020-10-15

## 2020-10-09 RX ORDER — HEPARIN 100 UNIT/ML
500 SYRINGE INTRAVENOUS
Status: CANCELLED | OUTPATIENT
Start: 2020-10-13

## 2020-10-12 ENCOUNTER — DOCUMENTATION ONLY (OUTPATIENT)
Dept: INFUSION THERAPY | Facility: HOSPITAL | Age: 70
End: 2020-10-12

## 2020-10-12 ENCOUNTER — INFUSION (OUTPATIENT)
Dept: INFUSION THERAPY | Facility: HOSPITAL | Age: 70
End: 2020-10-12
Attending: INTERNAL MEDICINE
Payer: MEDICARE

## 2020-10-12 VITALS
SYSTOLIC BLOOD PRESSURE: 122 MMHG | WEIGHT: 183.63 LBS | DIASTOLIC BLOOD PRESSURE: 56 MMHG | RESPIRATION RATE: 20 BRPM | HEART RATE: 75 BPM | BODY MASS INDEX: 33.79 KG/M2 | TEMPERATURE: 98 F | OXYGEN SATURATION: 100 % | HEIGHT: 62 IN

## 2020-10-12 DIAGNOSIS — C93.01 ACUTE MONOCYTIC LEUKEMIA IN REMISSION: ICD-10-CM

## 2020-10-12 DIAGNOSIS — C92.02 ACUTE MYELOID LEUKEMIA IN RELAPSE: Primary | ICD-10-CM

## 2020-10-12 PROCEDURE — 96413 CHEMO IV INFUSION 1 HR: CPT | Mod: PN

## 2020-10-12 PROCEDURE — 63600175 PHARM REV CODE 636 W HCPCS: Mod: JG,PN | Performed by: INTERNAL MEDICINE

## 2020-10-12 PROCEDURE — 25000003 PHARM REV CODE 250: Mod: PN | Performed by: INTERNAL MEDICINE

## 2020-10-12 RX ADMIN — DECITABINE 40 MG: 50 INJECTION, POWDER, LYOPHILIZED, FOR SOLUTION INTRAVENOUS at 03:10

## 2020-10-12 NOTE — PLAN OF CARE
Problem: Anemia (Chemotherapy Effects)  Goal: Anemia Symptom Improvement  Outcome: Ongoing, Progressing     Problem: Urinary Bleeding Risk or Actual (Chemotherapy Effects)  Goal: Absence of Hematuria  Outcome: Ongoing, Progressing     Problem: Nausea and Vomiting (Chemotherapy Effects)  Goal: Fluid and Electrolyte Balance  Outcome: Ongoing, Progressing     Problem: Neurotoxicity (Chemotherapy Effects)  Goal: Neurotoxicity Symptom Control  Outcome: Ongoing, Progressing     Problem: Neutropenia (Chemotherapy Effects)  Goal: Absence of Infection  Outcome: Ongoing, Progressing     Problem: Thrombocytopenia Bleeding Risk (Chemotherapy Effects)  Goal: Absence of Bleeding  Outcome: Ongoing, Progressing   Pt tolerated Dacogen well. D/C'd in stable condition.

## 2020-10-12 NOTE — PROGRESS NOTES
Pharmacy Treatment Plan Review    Patient  Sabrina Mac, 70 y.o.  1950    Indication: Acute myeloid leukemia in relapse     History:  -NPM1 +, CEBPA (-), FLT3 (+).  -Extensive history as outlined in Dr. Rincon office visit 10/8    Labs:  CBC  Lab Results   Component Value Date    WBC 1.98 (LL) 09/23/2020    HGB 9.8 (L) 09/23/2020    HCT 31.2 (L) 09/23/2020     (H) 09/23/2020    PLT 90 (L) 09/23/2020       BMP  Lab Results   Component Value Date     09/23/2020    K 4.6 09/23/2020     09/23/2020    CO2 26 09/23/2020    BUN 12 09/23/2020    CREATININE 1.2 09/23/2020    CALCIUM 9.5 09/23/2020    ANIONGAP 9 09/23/2020    ESTGFRAFRICA 53.3 (A) 09/23/2020    EGFRNONAA 46.2 (A) 09/23/2020       LFTs  Lab Results   Component Value Date    ALT 12 09/23/2020    AST 14 09/23/2020     (H) 06/22/2018    ALKPHOS 109 09/23/2020    BILITOT 0.5 09/23/2020       The patient is scheduled to start the following infusion:   OP DECITABINE (5 DAY) REGIMEN    28-day cycle for 12 cycles of:    Chemotherapy:   decitabine (DACOGEN) 20 mg/m2, in sodium chloride 0.9% 100 mL chemo infusion,  Intravenous, on days 1-5    Venetoclax PO at home as instructed    The treatment plan per NCCN template     Ricardo LunaD

## 2020-10-13 ENCOUNTER — INFUSION (OUTPATIENT)
Dept: INFUSION THERAPY | Facility: HOSPITAL | Age: 70
End: 2020-10-13
Attending: INTERNAL MEDICINE
Payer: MEDICARE

## 2020-10-13 VITALS
DIASTOLIC BLOOD PRESSURE: 67 MMHG | OXYGEN SATURATION: 98 % | BODY MASS INDEX: 33.79 KG/M2 | SYSTOLIC BLOOD PRESSURE: 117 MMHG | RESPIRATION RATE: 16 BRPM | WEIGHT: 183.63 LBS | TEMPERATURE: 97 F | HEART RATE: 80 BPM | HEIGHT: 62 IN

## 2020-10-13 DIAGNOSIS — C93.01 ACUTE MONOCYTIC LEUKEMIA IN REMISSION: ICD-10-CM

## 2020-10-13 DIAGNOSIS — C92.02 ACUTE MYELOID LEUKEMIA IN RELAPSE: Primary | ICD-10-CM

## 2020-10-13 PROCEDURE — 63600175 PHARM REV CODE 636 W HCPCS: Mod: JG,PN | Performed by: INTERNAL MEDICINE

## 2020-10-13 PROCEDURE — 25000003 PHARM REV CODE 250: Mod: PN | Performed by: INTERNAL MEDICINE

## 2020-10-13 PROCEDURE — 96413 CHEMO IV INFUSION 1 HR: CPT | Mod: PN

## 2020-10-13 RX ORDER — SODIUM CHLORIDE 0.9 % (FLUSH) 0.9 %
10 SYRINGE (ML) INJECTION
Status: DISCONTINUED | OUTPATIENT
Start: 2020-10-13 | End: 2020-10-13 | Stop reason: HOSPADM

## 2020-10-13 RX ORDER — HEPARIN 100 UNIT/ML
500 SYRINGE INTRAVENOUS
Status: DISCONTINUED | OUTPATIENT
Start: 2020-10-13 | End: 2020-10-13 | Stop reason: HOSPADM

## 2020-10-13 RX ADMIN — DECITABINE 40 MG: 50 INJECTION, POWDER, LYOPHILIZED, FOR SOLUTION INTRAVENOUS at 03:10

## 2020-10-13 NOTE — PLAN OF CARE
Problem: Adult Inpatient Plan of Care  Goal: Patient-Specific Goal (Individualization)  Flowsheets (Taken 10/13/2020 1451)  Individualized Care Needs: [place]  Anxieties, Fears or Concerns: none  Patient-Specific Goals (Include Timeframe): Infection prevention during tx     Problem: Fatigue  Goal: Improved Activity Tolerance  Intervention: Promote Energy Conservation  Flowsheets (Taken 10/13/2020 1451)  Fatigue Management:   frequent rest breaks encouraged   paced activity encouraged  Sleep/Rest Enhancement:   awakenings minimized   noise level reduced   regular sleep/rest pattern promoted

## 2020-10-13 NOTE — PLAN OF CARE
Problem: Adult Inpatient Plan of Care  Goal: Plan of Care Review  Flowsheets (Taken 10/13/2020 4321)  Plan of Care Reviewed With: patient    Pt tolerated infusion well, NAD. No new complaints voiced. Pt ambulated out of clinic without difficulty.

## 2020-10-14 ENCOUNTER — INFUSION (OUTPATIENT)
Dept: INFUSION THERAPY | Facility: HOSPITAL | Age: 70
End: 2020-10-14
Attending: INTERNAL MEDICINE
Payer: MEDICARE

## 2020-10-14 VITALS
WEIGHT: 183.63 LBS | HEART RATE: 74 BPM | RESPIRATION RATE: 18 BRPM | TEMPERATURE: 98 F | BODY MASS INDEX: 33.79 KG/M2 | HEIGHT: 62 IN | DIASTOLIC BLOOD PRESSURE: 58 MMHG | SYSTOLIC BLOOD PRESSURE: 102 MMHG

## 2020-10-14 DIAGNOSIS — C92.02 ACUTE MYELOID LEUKEMIA IN RELAPSE: Primary | ICD-10-CM

## 2020-10-14 DIAGNOSIS — C93.01 ACUTE MONOCYTIC LEUKEMIA IN REMISSION: ICD-10-CM

## 2020-10-14 PROCEDURE — 96413 CHEMO IV INFUSION 1 HR: CPT | Mod: PN

## 2020-10-14 PROCEDURE — 25000003 PHARM REV CODE 250: Mod: PN | Performed by: INTERNAL MEDICINE

## 2020-10-14 PROCEDURE — 63600175 PHARM REV CODE 636 W HCPCS: Mod: JG,PN | Performed by: INTERNAL MEDICINE

## 2020-10-14 RX ORDER — SODIUM CHLORIDE 0.9 % (FLUSH) 0.9 %
10 SYRINGE (ML) INJECTION
Status: DISCONTINUED | OUTPATIENT
Start: 2020-10-14 | End: 2020-10-14 | Stop reason: HOSPADM

## 2020-10-14 RX ORDER — HEPARIN 100 UNIT/ML
500 SYRINGE INTRAVENOUS
Status: DISCONTINUED | OUTPATIENT
Start: 2020-10-14 | End: 2020-10-14 | Stop reason: HOSPADM

## 2020-10-14 RX ADMIN — DECITABINE 40 MG: 50 INJECTION, POWDER, LYOPHILIZED, FOR SOLUTION INTRAVENOUS at 03:10

## 2020-10-14 RX ADMIN — HEPARIN 500 UNITS: 100 SYRINGE at 05:10

## 2020-10-14 NOTE — PLAN OF CARE
Problem: Adult Inpatient Plan of Care  Goal: Patient-Specific Goal (Individualization)  Flowsheets (Taken 10/14/2020 0832)  Individualized Care Needs: [place]  Anxieties, Fears or Concerns: none  Patient-Specific Goals (Include Timeframe): Infection prevention during tx     Problem: Fatigue  Goal: Improved Activity Tolerance  Intervention: Promote Energy Conservation  Flowsheets (Taken 10/14/2020 3991)  Fatigue Management:   frequent rest breaks encouraged   paced activity encouraged  Sleep/Rest Enhancement:   awakenings minimized   noise level reduced   regular sleep/rest pattern promoted

## 2020-10-14 NOTE — PLAN OF CARE
Problem: Adult Inpatient Plan of Care  Goal: Plan of Care Review  Flowsheets (Taken 10/14/2020 6970)  Plan of Care Reviewed With: patient    Pt tolerated infusion well, NAD. No new complaints voiced. Pt ambulated out of clinic without difficulty.

## 2020-10-15 ENCOUNTER — INFUSION (OUTPATIENT)
Dept: INFUSION THERAPY | Facility: HOSPITAL | Age: 70
End: 2020-10-15
Attending: INTERNAL MEDICINE
Payer: MEDICARE

## 2020-10-15 ENCOUNTER — PATIENT MESSAGE (OUTPATIENT)
Dept: HEMATOLOGY/ONCOLOGY | Facility: CLINIC | Age: 70
End: 2020-10-15

## 2020-10-15 VITALS
BODY MASS INDEX: 33.79 KG/M2 | TEMPERATURE: 98 F | WEIGHT: 183.63 LBS | HEIGHT: 62 IN | RESPIRATION RATE: 18 BRPM | HEART RATE: 79 BPM | OXYGEN SATURATION: 94 % | SYSTOLIC BLOOD PRESSURE: 103 MMHG | DIASTOLIC BLOOD PRESSURE: 63 MMHG

## 2020-10-15 DIAGNOSIS — R10.9 ABDOMINAL PAIN, UNSPECIFIED ABDOMINAL LOCATION: ICD-10-CM

## 2020-10-15 DIAGNOSIS — C92.00 AML (ACUTE MYELOBLASTIC LEUKEMIA): ICD-10-CM

## 2020-10-15 DIAGNOSIS — R79.89 ELEVATED LFTS: Primary | ICD-10-CM

## 2020-10-15 DIAGNOSIS — C92.02 ACUTE MYELOID LEUKEMIA IN RELAPSE: Primary | ICD-10-CM

## 2020-10-15 DIAGNOSIS — C93.01 ACUTE MONOCYTIC LEUKEMIA IN REMISSION: ICD-10-CM

## 2020-10-15 LAB
ALBUMIN SERPL BCP-MCNC: 3.7 G/DL (ref 3.5–5.2)
ALP SERPL-CCNC: 143 U/L (ref 38–145)
ALT SERPL W/O P-5'-P-CCNC: 38 U/L (ref 0–35)
ANION GAP SERPL CALC-SCNC: 9 MMOL/L (ref 8–16)
AST SERPL-CCNC: 34 U/L (ref 14–36)
BILIRUB SERPL-MCNC: 0.4 MG/DL (ref 0.2–1.3)
BUN SERPL-MCNC: 11 MG/DL (ref 7–18)
CALCIUM SERPL-MCNC: 9.2 MG/DL (ref 8.4–10.2)
CHLORIDE SERPL-SCNC: 106 MMOL/L (ref 95–110)
CO2 SERPL-SCNC: 25 MMOL/L (ref 22–31)
CREAT SERPL-MCNC: 1.16 MG/DL (ref 0.5–1.4)
EST. GFR  (AFRICAN AMERICAN): 56 ML/MIN/1.73 M^2
EST. GFR  (NON AFRICAN AMERICAN): 48 ML/MIN/1.73 M^2
GLUCOSE SERPL-MCNC: 112 MG/DL (ref 70–110)
POTASSIUM SERPL-SCNC: 5 MMOL/L (ref 3.5–5.1)
PROT SERPL-MCNC: 6.7 G/DL (ref 6–8.4)
SODIUM SERPL-SCNC: 140 MMOL/L (ref 136–145)

## 2020-10-15 PROCEDURE — 80053 COMPREHEN METABOLIC PANEL: CPT

## 2020-10-15 PROCEDURE — 96413 CHEMO IV INFUSION 1 HR: CPT | Mod: PN

## 2020-10-15 PROCEDURE — 25000003 PHARM REV CODE 250: Mod: PN | Performed by: INTERNAL MEDICINE

## 2020-10-15 PROCEDURE — 80053 COMPREHEN METABOLIC PANEL: CPT | Mod: PN

## 2020-10-15 PROCEDURE — 63600175 PHARM REV CODE 636 W HCPCS: Mod: JG,PN | Performed by: INTERNAL MEDICINE

## 2020-10-15 RX ORDER — SODIUM CHLORIDE 0.9 % (FLUSH) 0.9 %
10 SYRINGE (ML) INJECTION
Status: DISCONTINUED | OUTPATIENT
Start: 2020-10-15 | End: 2020-10-15 | Stop reason: HOSPADM

## 2020-10-15 RX ADMIN — DECITABINE 40 MG: 50 INJECTION, POWDER, LYOPHILIZED, FOR SOLUTION INTRAVENOUS at 04:10

## 2020-10-15 NOTE — PLAN OF CARE
Pt tolerated tx well today. Vitals remain stable.  Reviewed follow-up appointments. All questions were answered, d/c via wheelchair.

## 2020-10-16 ENCOUNTER — INFUSION (OUTPATIENT)
Dept: INFUSION THERAPY | Facility: HOSPITAL | Age: 70
End: 2020-10-16
Attending: INTERNAL MEDICINE
Payer: MEDICARE

## 2020-10-16 ENCOUNTER — HOSPITAL ENCOUNTER (OUTPATIENT)
Dept: RADIOLOGY | Facility: HOSPITAL | Age: 70
Discharge: HOME | End: 2020-10-16
Attending: INTERNAL MEDICINE
Payer: MEDICARE

## 2020-10-16 VITALS
TEMPERATURE: 97 F | SYSTOLIC BLOOD PRESSURE: 98 MMHG | DIASTOLIC BLOOD PRESSURE: 59 MMHG | HEART RATE: 71 BPM | RESPIRATION RATE: 18 BRPM

## 2020-10-16 DIAGNOSIS — R79.89 ELEVATED LFTS: ICD-10-CM

## 2020-10-16 DIAGNOSIS — C92.02 ACUTE MYELOID LEUKEMIA IN RELAPSE: Primary | ICD-10-CM

## 2020-10-16 DIAGNOSIS — C93.01 ACUTE MONOCYTIC LEUKEMIA IN REMISSION: ICD-10-CM

## 2020-10-16 DIAGNOSIS — R10.9 ABDOMINAL PAIN, UNSPECIFIED ABDOMINAL LOCATION: ICD-10-CM

## 2020-10-16 PROCEDURE — 25000003 PHARM REV CODE 250: Mod: PN | Performed by: INTERNAL MEDICINE

## 2020-10-16 PROCEDURE — 96413 CHEMO IV INFUSION 1 HR: CPT | Mod: PN

## 2020-10-16 PROCEDURE — 76700 US ABDOMEN COMPLETE: ICD-10-PCS | Mod: 26,,, | Performed by: RADIOLOGY

## 2020-10-16 PROCEDURE — 76700 US EXAM ABDOM COMPLETE: CPT | Mod: TC,PO

## 2020-10-16 PROCEDURE — 76700 US EXAM ABDOM COMPLETE: CPT | Mod: 26,,, | Performed by: RADIOLOGY

## 2020-10-16 PROCEDURE — A4216 STERILE WATER/SALINE, 10 ML: HCPCS | Mod: PN | Performed by: INTERNAL MEDICINE

## 2020-10-16 PROCEDURE — 96375 TX/PRO/DX INJ NEW DRUG ADDON: CPT | Mod: PN

## 2020-10-16 PROCEDURE — 63600175 PHARM REV CODE 636 W HCPCS: Mod: PN | Performed by: INTERNAL MEDICINE

## 2020-10-16 RX ORDER — ONDANSETRON 2 MG/ML
8 INJECTION INTRAMUSCULAR; INTRAVENOUS ONCE
Status: COMPLETED | OUTPATIENT
Start: 2020-10-16 | End: 2020-10-16

## 2020-10-16 RX ORDER — SODIUM CHLORIDE 0.9 % (FLUSH) 0.9 %
10 SYRINGE (ML) INJECTION
Status: DISCONTINUED | OUTPATIENT
Start: 2020-10-16 | End: 2020-10-16 | Stop reason: HOSPADM

## 2020-10-16 RX ADMIN — ONDANSETRON 8 MG: 2 INJECTION INTRAMUSCULAR; INTRAVENOUS at 01:10

## 2020-10-16 RX ADMIN — DECITABINE 40 MG: 50 INJECTION, POWDER, LYOPHILIZED, FOR SOLUTION INTRAVENOUS at 01:10

## 2020-10-16 RX ADMIN — Medication 10 ML: at 02:10

## 2020-10-16 NOTE — PLAN OF CARE
Pt tolerated tx well today. Vitals remain stable. Reviewed follow-up appointments. All questions were answered, pt d/c via wheelchair.

## 2020-10-20 ENCOUNTER — PATIENT MESSAGE (OUTPATIENT)
Dept: HEMATOLOGY/ONCOLOGY | Facility: CLINIC | Age: 70
End: 2020-10-20

## 2020-10-20 ENCOUNTER — DOCUMENTATION ONLY (OUTPATIENT)
Dept: INFUSION THERAPY | Facility: HOSPITAL | Age: 70
End: 2020-10-20

## 2020-10-20 ENCOUNTER — TELEPHONE (OUTPATIENT)
Dept: PHARMACY | Facility: CLINIC | Age: 70
End: 2020-10-20

## 2020-10-20 DIAGNOSIS — C92.02 ACUTE MYELOID LEUKEMIA IN RELAPSE: ICD-10-CM

## 2020-10-20 DIAGNOSIS — R10.9 ABDOMINAL PAIN, UNSPECIFIED ABDOMINAL LOCATION: Primary | ICD-10-CM

## 2020-10-22 NOTE — TELEPHONE ENCOUNTER
DOCUMENTATION ONLY:  Prior authorization for Venclexta approved from 10/5/20 until further notice    Case Id: [id number]    Co-pay: $1830.56, patient assistance is being researched.

## 2020-10-26 ENCOUNTER — TELEPHONE (OUTPATIENT)
Dept: HEMATOLOGY/ONCOLOGY | Facility: CLINIC | Age: 70
End: 2020-10-26

## 2020-10-26 NOTE — TELEPHONE ENCOUNTER
We will be assisting the patient with applying to [employer] for her Venclexta prescription due to the high co pay.    Sending the application to Dr Troy for her review and signature.

## 2020-10-26 NOTE — TELEPHONE ENCOUNTER
----- Message from Reyna Mena sent at 10/26/2020 10:51 AM CDT -----  Regarding: Venclexta Assistance  Patients Venclexta is approved with a high co pay. We will be assisting the patient with applying to [employer] patient assistance. I am faxing the assistance application to you for your review and signature to [phone]. If you would like the application faxed to an alternate number please let me know.  Thank you  Joleen  [phone]

## 2020-10-27 ENCOUNTER — HOSPITAL ENCOUNTER (INPATIENT)
Facility: HOSPITAL | Age: 70
LOS: 1 days | Discharge: HOME | DRG: 809 | End: 2020-10-28
Attending: EMERGENCY MEDICINE | Admitting: INTERNAL MEDICINE
Payer: MEDICARE

## 2020-10-27 DIAGNOSIS — C92.02 ACUTE MYELOID LEUKEMIA IN RELAPSE: ICD-10-CM

## 2020-10-27 DIAGNOSIS — R53.1 WEAKNESS: ICD-10-CM

## 2020-10-27 DIAGNOSIS — D64.9 SYMPTOMATIC ANEMIA: Primary | ICD-10-CM

## 2020-10-27 DIAGNOSIS — D61.810 PANCYTOPENIA DUE TO CHEMOTHERAPY: ICD-10-CM

## 2020-10-27 PROBLEM — A41.9 SEPSIS: Status: ACTIVE | Noted: 2020-10-27

## 2020-10-27 PROBLEM — R53.83 LETHARGY: Status: ACTIVE | Noted: 2020-10-27

## 2020-10-27 LAB
ABO + RH BLD: NORMAL
ALBUMIN SERPL BCP-MCNC: 3.5 G/DL (ref 3.5–5.2)
ALP SERPL-CCNC: 113 U/L (ref 55–135)
ALT SERPL W/O P-5'-P-CCNC: 8 U/L (ref 10–44)
ANION GAP SERPL CALC-SCNC: 11 MMOL/L (ref 8–16)
ANISOCYTOSIS BLD QL SMEAR: SLIGHT
ANISOCYTOSIS BLD QL SMEAR: SLIGHT
AST SERPL-CCNC: 15 U/L (ref 10–40)
BASO STIPL BLD QL SMEAR: ABNORMAL
BASOPHILS # BLD AUTO: 0 K/UL (ref 0–0.2)
BASOPHILS # BLD AUTO: ABNORMAL K/UL (ref 0–0.2)
BASOPHILS NFR BLD: 0 % (ref 0–1.9)
BASOPHILS NFR BLD: 0 % (ref 0–1.9)
BILIRUB SERPL-MCNC: 0.4 MG/DL (ref 0.1–1)
BILIRUB UR QL STRIP: NEGATIVE
BLD GP AB SCN CELLS X3 SERPL QL: NORMAL
BLD PROD TYP BPU: NORMAL
BLD PROD TYP BPU: NORMAL
BLOOD UNIT EXPIRATION DATE: NORMAL
BLOOD UNIT EXPIRATION DATE: NORMAL
BLOOD UNIT TYPE CODE: 6200
BLOOD UNIT TYPE CODE: 6200
BLOOD UNIT TYPE: NORMAL
BLOOD UNIT TYPE: NORMAL
BUN SERPL-MCNC: 13 MG/DL (ref 8–23)
CALCIUM SERPL-MCNC: 9.4 MG/DL (ref 8.7–10.5)
CHLORIDE SERPL-SCNC: 107 MMOL/L (ref 95–110)
CLARITY UR REFRACT.AUTO: CLEAR
CO2 SERPL-SCNC: 21 MMOL/L (ref 23–29)
CODING SYSTEM: NORMAL
CODING SYSTEM: NORMAL
COLOR UR AUTO: ABNORMAL
CREAT SERPL-MCNC: 1.2 MG/DL (ref 0.5–1.4)
CTP QC/QA: YES
DACRYOCYTES BLD QL SMEAR: ABNORMAL
DACRYOCYTES BLD QL SMEAR: ABNORMAL
DIFFERENTIAL METHOD: ABNORMAL
DIFFERENTIAL METHOD: ABNORMAL
DISPENSE STATUS: NORMAL
DISPENSE STATUS: NORMAL
EOSINOPHIL # BLD AUTO: 0 K/UL (ref 0–0.5)
EOSINOPHIL # BLD AUTO: ABNORMAL K/UL (ref 0–0.5)
EOSINOPHIL NFR BLD: 0 % (ref 0–8)
EOSINOPHIL NFR BLD: 0.8 % (ref 0–8)
ERYTHROCYTE [DISTWIDTH] IN BLOOD BY AUTOMATED COUNT: 15.5 % (ref 11.5–14.5)
ERYTHROCYTE [DISTWIDTH] IN BLOOD BY AUTOMATED COUNT: 15.6 % (ref 11.5–14.5)
EST. GFR  (AFRICAN AMERICAN): 53.3 ML/MIN/1.73 M^2
EST. GFR  (NON AFRICAN AMERICAN): 46.2 ML/MIN/1.73 M^2
GLUCOSE SERPL-MCNC: 128 MG/DL (ref 70–110)
GLUCOSE UR QL STRIP: NEGATIVE
HCT VFR BLD AUTO: 20 % (ref 37–48.5)
HCT VFR BLD AUTO: 22.9 % (ref 37–48.5)
HGB BLD-MCNC: 6.5 G/DL (ref 12–16)
HGB BLD-MCNC: 7.3 G/DL (ref 12–16)
HGB UR QL STRIP: ABNORMAL
HYPOCHROMIA BLD QL SMEAR: ABNORMAL
HYPOCHROMIA BLD QL SMEAR: ABNORMAL
IMM GRANULOCYTES # BLD AUTO: 0.01 K/UL (ref 0–0.04)
IMM GRANULOCYTES # BLD AUTO: ABNORMAL K/UL (ref 0–0.04)
IMM GRANULOCYTES NFR BLD AUTO: 0.8 % (ref 0–0.5)
IMM GRANULOCYTES NFR BLD AUTO: ABNORMAL % (ref 0–0.5)
KETONES UR QL STRIP: NEGATIVE
LEUKOCYTE ESTERASE UR QL STRIP: NEGATIVE
LYMPHOCYTES # BLD AUTO: 1.1 K/UL (ref 1–4.8)
LYMPHOCYTES # BLD AUTO: ABNORMAL K/UL (ref 1–4.8)
LYMPHOCYTES NFR BLD: 84 % (ref 18–48)
LYMPHOCYTES NFR BLD: 94 % (ref 18–48)
MCH RBC QN AUTO: 31.4 PG (ref 27–31)
MCH RBC QN AUTO: 31.6 PG (ref 27–31)
MCHC RBC AUTO-ENTMCNC: 31.9 G/DL (ref 32–36)
MCHC RBC AUTO-ENTMCNC: 32.5 G/DL (ref 32–36)
MCV RBC AUTO: 97 FL (ref 82–98)
MCV RBC AUTO: 99 FL (ref 82–98)
MICROSCOPIC COMMENT: NORMAL
MONOCYTES # BLD AUTO: 0.1 K/UL (ref 0.3–1)
MONOCYTES # BLD AUTO: ABNORMAL K/UL (ref 0.3–1)
MONOCYTES NFR BLD: 2 % (ref 4–15)
MONOCYTES NFR BLD: 8.8 % (ref 4–15)
NEUTROPHILS # BLD AUTO: 0.1 K/UL (ref 1.8–7.7)
NEUTROPHILS NFR BLD: 4 % (ref 38–73)
NEUTROPHILS NFR BLD: 5.6 % (ref 38–73)
NITRITE UR QL STRIP: NEGATIVE
NRBC BLD-RTO: 1 /100 WBC
NRBC BLD-RTO: 2 /100 WBC
NUM UNITS TRANS PACKED RBC: NORMAL
NUM UNITS TRANS WBC-POOR PLATPHERESIS: NORMAL
OVALOCYTES BLD QL SMEAR: ABNORMAL
OVALOCYTES BLD QL SMEAR: ABNORMAL
PH UR STRIP: 6 [PH] (ref 5–8)
PLATELET # BLD AUTO: 42 K/UL (ref 150–350)
PLATELET # BLD AUTO: 9 K/UL (ref 150–350)
PLATELET BLD QL SMEAR: ABNORMAL
PMV BLD AUTO: 10.1 FL (ref 9.2–12.9)
PMV BLD AUTO: ABNORMAL FL (ref 9.2–12.9)
POIKILOCYTOSIS BLD QL SMEAR: SLIGHT
POIKILOCYTOSIS BLD QL SMEAR: SLIGHT
POLYCHROMASIA BLD QL SMEAR: ABNORMAL
POLYCHROMASIA BLD QL SMEAR: ABNORMAL
POTASSIUM SERPL-SCNC: 4.7 MMOL/L (ref 3.5–5.1)
PROT SERPL-MCNC: 6.9 G/DL (ref 6–8.4)
PROT UR QL STRIP: NEGATIVE
RBC # BLD AUTO: 2.07 M/UL (ref 4–5.4)
RBC # BLD AUTO: 2.31 M/UL (ref 4–5.4)
RBC #/AREA URNS AUTO: 1 /HPF (ref 0–4)
SARS-COV-2 RDRP RESP QL NAA+PROBE: NEGATIVE
SODIUM SERPL-SCNC: 139 MMOL/L (ref 136–145)
SP GR UR STRIP: 1 (ref 1–1.03)
URATE SERPL-MCNC: 5 MG/DL (ref 2.4–5.7)
URN SPEC COLLECT METH UR: ABNORMAL
WBC # BLD AUTO: 1.25 K/UL (ref 3.9–12.7)
WBC # BLD AUTO: 1.45 K/UL (ref 3.9–12.7)
WBC #/AREA URNS AUTO: 0 /HPF (ref 0–5)

## 2020-10-27 PROCEDURE — 99223 1ST HOSP IP/OBS HIGH 75: CPT | Mod: AI,,, | Performed by: INTERNAL MEDICINE

## 2020-10-27 PROCEDURE — G0378 HOSPITAL OBSERVATION PER HR: HCPCS

## 2020-10-27 PROCEDURE — 81001 URINALYSIS AUTO W/SCOPE: CPT

## 2020-10-27 PROCEDURE — 96361 HYDRATE IV INFUSION ADD-ON: CPT

## 2020-10-27 PROCEDURE — 96360 HYDRATION IV INFUSION INIT: CPT

## 2020-10-27 PROCEDURE — 87040 BLOOD CULTURE FOR BACTERIA: CPT

## 2020-10-27 PROCEDURE — 99291 CRITICAL CARE FIRST HOUR: CPT | Mod: ,,, | Performed by: EMERGENCY MEDICINE

## 2020-10-27 PROCEDURE — 87086 URINE CULTURE/COLONY COUNT: CPT

## 2020-10-27 PROCEDURE — 85027 COMPLETE CBC AUTOMATED: CPT

## 2020-10-27 PROCEDURE — 80053 COMPREHEN METABOLIC PANEL: CPT

## 2020-10-27 PROCEDURE — 93010 ELECTROCARDIOGRAM REPORT: CPT | Mod: ,,, | Performed by: INTERNAL MEDICINE

## 2020-10-27 PROCEDURE — 36415 COLL VENOUS BLD VENIPUNCTURE: CPT

## 2020-10-27 PROCEDURE — 84550 ASSAY OF BLOOD/URIC ACID: CPT

## 2020-10-27 PROCEDURE — 25000003 PHARM REV CODE 250: Performed by: STUDENT IN AN ORGANIZED HEALTH CARE EDUCATION/TRAINING PROGRAM

## 2020-10-27 PROCEDURE — 86920 COMPATIBILITY TEST SPIN: CPT

## 2020-10-27 PROCEDURE — 93010 EKG 12-LEAD: ICD-10-PCS | Mod: ,,, | Performed by: INTERNAL MEDICINE

## 2020-10-27 PROCEDURE — 93005 ELECTROCARDIOGRAM TRACING: CPT

## 2020-10-27 PROCEDURE — 99291 CRITICAL CARE FIRST HOUR: CPT | Mod: 25

## 2020-10-27 PROCEDURE — U0002 COVID-19 LAB TEST NON-CDC: HCPCS | Performed by: EMERGENCY MEDICINE

## 2020-10-27 PROCEDURE — P9037 PLATE PHERES LEUKOREDU IRRAD: HCPCS

## 2020-10-27 PROCEDURE — 85007 BL SMEAR W/DIFF WBC COUNT: CPT

## 2020-10-27 PROCEDURE — P9040 RBC LEUKOREDUCED IRRADIATED: HCPCS

## 2020-10-27 PROCEDURE — 36430 TRANSFUSION BLD/BLD COMPNT: CPT

## 2020-10-27 PROCEDURE — 86901 BLOOD TYPING SEROLOGIC RH(D): CPT

## 2020-10-27 PROCEDURE — 99291 PR CRITICAL CARE, E/M 30-74 MINUTES: ICD-10-PCS | Mod: ,,, | Performed by: EMERGENCY MEDICINE

## 2020-10-27 PROCEDURE — 85025 COMPLETE CBC W/AUTO DIFF WBC: CPT

## 2020-10-27 PROCEDURE — 99223 PR INITIAL HOSPITAL CARE,LEVL III: ICD-10-PCS | Mod: AI,,, | Performed by: INTERNAL MEDICINE

## 2020-10-27 PROCEDURE — 27201040 HC RC 50 FILTER

## 2020-10-27 RX ORDER — OFLOXACIN 3 MG/ML
5 SOLUTION AURICULAR (OTIC) DAILY
Status: DISCONTINUED | OUTPATIENT
Start: 2020-10-27 | End: 2020-10-28 | Stop reason: HOSPADM

## 2020-10-27 RX ORDER — HYDROCODONE BITARTRATE AND ACETAMINOPHEN 500; 5 MG/1; MG/1
TABLET ORAL
Status: DISCONTINUED | OUTPATIENT
Start: 2020-10-27 | End: 2020-10-28 | Stop reason: HOSPADM

## 2020-10-27 RX ORDER — HYDROCODONE BITARTRATE AND ACETAMINOPHEN 5; 325 MG/1; MG/1
1 TABLET ORAL EVERY 6 HOURS PRN
Status: DISCONTINUED | OUTPATIENT
Start: 2020-10-27 | End: 2020-10-28 | Stop reason: HOSPADM

## 2020-10-27 RX ORDER — MORPHINE SULFATE 20 MG/ML
10 SOLUTION ORAL EVERY 6 HOURS PRN
Status: DISCONTINUED | OUTPATIENT
Start: 2020-10-27 | End: 2020-10-28 | Stop reason: HOSPADM

## 2020-10-27 RX ORDER — FAMOTIDINE 20 MG/1
20 TABLET, FILM COATED ORAL NIGHTLY
Status: DISCONTINUED | OUTPATIENT
Start: 2020-10-27 | End: 2020-10-28 | Stop reason: HOSPADM

## 2020-10-27 RX ORDER — IBUPROFEN 200 MG
24 TABLET ORAL
Status: DISCONTINUED | OUTPATIENT
Start: 2020-10-27 | End: 2020-10-28 | Stop reason: HOSPADM

## 2020-10-27 RX ORDER — SODIUM CHLORIDE 0.9 % (FLUSH) 0.9 %
10 SYRINGE (ML) INJECTION
Status: DISCONTINUED | OUTPATIENT
Start: 2020-10-27 | End: 2020-10-28 | Stop reason: HOSPADM

## 2020-10-27 RX ORDER — ALPRAZOLAM 0.25 MG/1
0.25 TABLET ORAL NIGHTLY PRN
Status: DISCONTINUED | OUTPATIENT
Start: 2020-10-27 | End: 2020-10-28 | Stop reason: HOSPADM

## 2020-10-27 RX ORDER — ACETAMINOPHEN 325 MG/1
650 TABLET ORAL EVERY 4 HOURS PRN
Status: DISCONTINUED | OUTPATIENT
Start: 2020-10-27 | End: 2020-10-28 | Stop reason: HOSPADM

## 2020-10-27 RX ORDER — METOPROLOL SUCCINATE 25 MG/1
25 TABLET, EXTENDED RELEASE ORAL DAILY
Status: DISCONTINUED | OUTPATIENT
Start: 2020-10-27 | End: 2020-10-28 | Stop reason: HOSPADM

## 2020-10-27 RX ORDER — GLUCAGON 1 MG
1 KIT INJECTION
Status: DISCONTINUED | OUTPATIENT
Start: 2020-10-27 | End: 2020-10-28 | Stop reason: HOSPADM

## 2020-10-27 RX ORDER — ACYCLOVIR 200 MG/1
400 CAPSULE ORAL 2 TIMES DAILY
Status: DISCONTINUED | OUTPATIENT
Start: 2020-10-27 | End: 2020-10-28 | Stop reason: HOSPADM

## 2020-10-27 RX ORDER — FAMOTIDINE 20 MG/1
40 TABLET, FILM COATED ORAL NIGHTLY
Status: DISCONTINUED | OUTPATIENT
Start: 2020-10-27 | End: 2020-10-27

## 2020-10-27 RX ORDER — SODIUM CHLORIDE 9 MG/ML
INJECTION, SOLUTION INTRAVENOUS CONTINUOUS
Status: ACTIVE | OUTPATIENT
Start: 2020-10-27 | End: 2020-10-28

## 2020-10-27 RX ORDER — PANTOPRAZOLE SODIUM 40 MG/1
40 TABLET, DELAYED RELEASE ORAL DAILY
Status: DISCONTINUED | OUTPATIENT
Start: 2020-10-27 | End: 2020-10-28 | Stop reason: HOSPADM

## 2020-10-27 RX ORDER — FLUCONAZOLE 200 MG/1
200 TABLET ORAL DAILY
Status: DISCONTINUED | OUTPATIENT
Start: 2020-10-27 | End: 2020-10-28 | Stop reason: HOSPADM

## 2020-10-27 RX ORDER — IBUPROFEN 200 MG
16 TABLET ORAL
Status: DISCONTINUED | OUTPATIENT
Start: 2020-10-27 | End: 2020-10-28 | Stop reason: HOSPADM

## 2020-10-27 RX ORDER — ONDANSETRON 8 MG/1
8 TABLET, ORALLY DISINTEGRATING ORAL EVERY 8 HOURS PRN
Status: DISCONTINUED | OUTPATIENT
Start: 2020-10-27 | End: 2020-10-28 | Stop reason: HOSPADM

## 2020-10-27 RX ORDER — LEVOFLOXACIN 500 MG/1
500 TABLET, FILM COATED ORAL DAILY
Status: DISCONTINUED | OUTPATIENT
Start: 2020-10-27 | End: 2020-10-28 | Stop reason: HOSPADM

## 2020-10-27 RX ORDER — METOPROLOL TARTRATE 25 MG/1
25 TABLET ORAL DAILY
Status: DISCONTINUED | OUTPATIENT
Start: 2020-10-28 | End: 2020-10-27

## 2020-10-27 RX ORDER — METOPROLOL TARTRATE 25 MG/1
25 TABLET ORAL DAILY
Status: DISCONTINUED | OUTPATIENT
Start: 2020-10-27 | End: 2020-10-27

## 2020-10-27 RX ORDER — ESCITALOPRAM OXALATE 20 MG/1
20 TABLET ORAL DAILY
Status: DISCONTINUED | OUTPATIENT
Start: 2020-10-27 | End: 2020-10-28 | Stop reason: HOSPADM

## 2020-10-27 RX ADMIN — FAMOTIDINE 20 MG: 20 TABLET, FILM COATED ORAL at 08:10

## 2020-10-27 RX ADMIN — SODIUM CHLORIDE: 0.9 INJECTION, SOLUTION INTRAVENOUS at 06:10

## 2020-10-27 RX ADMIN — FLUCONAZOLE 200 MG: 200 TABLET ORAL at 04:10

## 2020-10-27 RX ADMIN — ACYCLOVIR 400 MG: 200 CAPSULE ORAL at 08:10

## 2020-10-27 RX ADMIN — METOPROLOL SUCCINATE 25 MG: 25 TABLET, EXTENDED RELEASE ORAL at 05:10

## 2020-10-27 RX ADMIN — PANTOPRAZOLE SODIUM 40 MG: 40 TABLET, DELAYED RELEASE ORAL at 04:10

## 2020-10-27 RX ADMIN — LEVOFLOXACIN 500 MG: 500 TABLET, FILM COATED ORAL at 04:10

## 2020-10-27 RX ADMIN — ESCITALOPRAM OXALATE 20 MG: 20 TABLET ORAL at 04:10

## 2020-10-27 NOTE — ASSESSMENT & PLAN NOTE
Likely 2/2 anemia and poor PO intake. Need to r/o infection.     - IVF overnight  - PT/OT  - diet with boost supplement

## 2020-10-27 NOTE — SUBJECTIVE & OBJECTIVE
Patient information was obtained from patient, past medical records and ER records.     Oncology History:   Ms. Mac is patient of Dr. Troy with history of AML. Previously in remission s/p induction therapy x2 with 7+3 and FLAG-JENNIFER  with bone marrow biopsy done on 05/9/19 showing no morphologic evidence of residual AML. Had repeat bone marrow biopsy 9/23/20, revealing early relapse with 11% blasts. She completed cycle 1 of decitabine + venetoclax 10/16/20.      (Not in a hospital admission)      Methotrexate analogues, Sulfa (sulfonamide antibiotics), and Bactrim [sulfamethoxazole-trimethoprim]     Past Medical History:   Diagnosis Date    Cancer 03/2018    AML    CHF (congestive heart failure)     Diarrhea 9/14/2018    Encounter for blood transfusion      Past Surgical History:   Procedure Laterality Date    BONE MARROW BIOPSY Left 12/27/2018    Procedure: Biopsy-bone marrow;  Surgeon: Laquita Troy MD;  Location: [address];  Service: Orthopedics;  Laterality: Left;    BONE MARROW BIOPSY Left 5/9/2019    Procedure: Biopsy-bone marrow;  Surgeon: Laquita Troy MD;  Location: [facility] OR [address] [place] [address];  Service: Orthopedics;  Laterality: Left;    CHOLECYSTECTOMY      HYSTERECTOMY      TONSILLECTOMY       Family History     Problem Relation (Age of Onset)    Cancer Mother, Father        Tobacco Use    Smoking status: Former Smoker     Packs/day: 0.50     Years: 10.00     Pack years: 5.00     Types: Cigarettes     Quit date: 3/25/2017     Years since quitting: 3.5    Smokeless tobacco: Never Used   Substance and Sexual Activity    Alcohol use: Not Currently     Alcohol/week: 1.0 standard drinks     Types: 1 Shots of liquor per week    Drug use: No    Sexual activity: Yes     Partners: Female       Review of Systems   Constitutional: Negative for chills and fever.   HENT: Positive for nosebleeds. Negative for sore throat and trouble swallowing.    Eyes: Negative for photophobia and visual disturbance.    Respiratory: Negative for cough and shortness of breath.    Cardiovascular: Negative for chest pain, palpitations and leg swelling.   Gastrointestinal: Negative for abdominal pain, diarrhea, nausea and vomiting.   Genitourinary: Negative for difficulty urinating and dysuria.   Musculoskeletal: Negative for back pain, neck pain and neck stiffness.   Skin: Negative for color change and rash.   Neurological: Positive for dizziness, weakness and light-headedness. Negative for headaches.   Psychiatric/Behavioral: Negative for confusion.     Objective:     Vital Signs (Most Recent):  Temp: 98.1 °F (36.7 °C) (10/27/20 1310)  Pulse: 78 (10/27/20 1500)  Resp: 18 (10/27/20 1500)  BP: (!) 103/56 (10/27/20 1500)  SpO2: 99 % (10/27/20 1500) Vital Signs (24h Range):  Temp:  [98.1 °F (36.7 °C)] 98.1 °F (36.7 °C)  Pulse:  [76-82] 78  Resp:  [18-20] 18  SpO2:  [99 %-100 %] 99 %  BP: ()/(51-57) 103/56     Weight: 83 kg (183 lb)  Body mass index is 33.47 kg/m².  Body surface area is 1.91 meters squared.    ECOG SCORE         [unfilled]    Lines/Drains/Airways     Peripheral Intravenous Line                 Peripheral IV - Single Lumen 10/18/19 0304 20 G Posterior;Right Forearm 375 days         Peripheral IV - Single Lumen 10/27/20 1457 20 G Left Hand less than 1 day                Physical Exam  Constitutional:       General: She is not in acute distress.     Appearance: She is not toxic-appearing or diaphoretic.   HENT:      Head: Normocephalic and atraumatic.      Mouth/Throat:      Mouth: Mucous membranes are dry.   Eyes:      General: No scleral icterus.        Right eye: No discharge.         Left eye: No discharge.   Neck:      Musculoskeletal: No neck rigidity.   Cardiovascular:      Rate and Rhythm: Normal rate and regular rhythm.   Pulmonary:      Effort: Pulmonary effort is normal. No respiratory distress.      Breath sounds: Normal breath sounds. No wheezing.   Abdominal:      General: Abdomen is flat. There is no  distension.      Palpations: Abdomen is soft.      Tenderness: There is no abdominal tenderness.   Musculoskeletal:         General: No swelling or tenderness.   Skin:     General: Skin is warm and dry.      Coloration: Skin is pale.   Neurological:      General: No focal deficit present.      Mental Status: She is alert and oriented to person, place, and time.         Significant Labs:   CBC:   Recent Labs   Lab 10/27/20  1354   WBC 1.45*   HGB 7.3*   HCT 22.9*   PLT 9*   , CMP:   Recent Labs   Lab 10/27/20  1354      K 4.7      CO2 21*   *   BUN 13   CREATININE 1.2   CALCIUM 9.4   PROT 6.9   ALBUMIN 3.5   BILITOT 0.4   ALKPHOS 113   AST 15   ALT 8*   ANIONGAP 11   EGFRNONAA 46.2*    and All pertinent labs from the last 24 hours have been reviewed.    Diagnostic Results:  I have reviewed all pertinent imaging results/findings within the past 24 hours.   CXR:  FINDINGS:  Cardiac size is not enlarged.  Aorta is tortuous and lungs are clear no acute findings.     Impression:     See above        Electronically signed by: Titus Goodson MD  Date:                                            10/27/2020  Time:                                           13:42

## 2020-10-27 NOTE — ASSESSMENT & PLAN NOTE
Ms. Mac is patient of Dr. Troy with history of AML. Previously in remission s/p induction therapy x2 with 7+3 and FLAG-JENNIFER  with bone marrow biopsy done on 05/9/19 showing no morphologic evidence of residual AML. Had repeat bone marrow biopsy 9/23/20, revealing early relapse with 11% blasts. She completed cycle 1 of decitabine + venetoclax 10/16/20.

## 2020-10-27 NOTE — ASSESSMENT & PLAN NOTE
Ms. Mac is patient of Dr. Troy with history of AML. Previously in remission s/p induction therapy x2 with 7+3 and FLAG-JENNIFER  with bone marrow biopsy done on 05/9/19 showing no morphologic evidence of residual AML. Had repeat bone marrow biopsy 9/23/20, revealing early relapse with 11% blasts. She completed cycle 1 of decitabine + venetoclax 10/16/20.     - hold venetoclax while inpatient  - continue levofloxacin/fluconazole/acyclovir for OI prophylaxis

## 2020-10-27 NOTE — ASSESSMENT & PLAN NOTE
Presented with worsening fatigue/lethargy.   - BCx X 2  - UA + culture  - CXR clear  - Hold abx while no obvious signs of infection and hemodynamically stable

## 2020-10-27 NOTE — HPI
Patient is 70 y.o. F w/ CHF, relapse AML s/p cycle 1 of decitabine + venetoclax (10/16/20) presented to ED on 10/27/20 w/ 1 week of worsening fatigue, weakness, and dizziness. Patient reports that her fatigue has significantly worsened in the past 4-5 days, and is SOB after walking for about 20 feet. Also reports dizziness when standing up. Denies any falls/trauma/syncope. Had an episode of nose bleed prior to admission that stopped spontaneously. Appetite has been decreased as well. Reports h/o V-tach which is controlled on daily metoprolol taken at 4PM. Denies any chest pain/palpitations. Denies night sweats/fevers. Denies HA, confusion, neck stiffness, dysuria, flank pain, rashes. Denies swelling.     Ms. Mac is patient of Dr. Troy with history of AML. Previously in remission s/p induction therapy x2 with 7+3 and FLAG-JENNIFER  with bone marrow biopsy done on 05/9/19 showing no morphologic evidence of residual AML. Had repeat bone marrow biopsy 9/23/20, revealing early relapse with 11% blasts. She completed cycle 1 of decitabine + venetoclax 10/16/20.

## 2020-10-27 NOTE — ASSESSMENT & PLAN NOTE
70 y.o. F w/ CHF, relapse AML s/p cycle 1 of decitabine + venetoclax (10/16/20) presented to ED on 10/27/20 w/ 1 week of worsening fatigue, weakness, and dizziness. Non-toxic appearing and vitals wnl.     Hgb 7.3; plt 9  CMP wnl     Plan:  - keep hgb >7, plt >30 with nosebleed  - will obtain uric acid to rule out TLS; d-dimer and fibrinogen to rule out DIC  - keep patient under observation overnight

## 2020-10-27 NOTE — H&P
[facility]  Hematology  Bone Marrow Transplant  H&P    Subjective:     Principal Problem: Symptomatic anemia    HPI: Patient is 70 y.o. F w/ CHF, relapse AML s/p cycle 1 of decitabine + venetoclax (10/16/20) presented to ED on 10/27/20 w/ 1 week of worsening fatigue, weakness, and dizziness. Patient reports that her fatigue has significantly worsened in the past 4-5 days, and is SOB after walking for about 20 feet. Also reports dizziness when standing up. Denies any falls/trauma/syncope. Had an episode of nose bleed prior to admission that stopped spontaneously. Appetite has been decreased as well. Reports h/o V-tach which is controlled on daily metoprolol taken at 4PM. Denies any chest pain/palpitations. Denies night sweats/fevers. Denies HA, confusion, neck stiffness, dysuria, flank pain, rashes. Denies swelling.     Ms. Mac is patient of Dr. Troy with history of AML. Previously in remission s/p induction therapy x2 with 7+3 and FLAG-JENNIFER  with bone marrow biopsy done on 05/9/19 showing no morphologic evidence of residual AML. Had repeat bone marrow biopsy 9/23/20, revealing early relapse with 11% blasts. She completed cycle 1 of decitabine + venetoclax 10/16/20.     Patient information was obtained from patient, past medical records and ER records.     Oncology History:   Ms. Mac is patient of Dr. Troy with history of AML. Previously in remission s/p induction therapy x2 with 7+3 and FLAG-JENNIFER  with bone marrow biopsy done on 05/9/19 showing no morphologic evidence of residual AML. Had repeat bone marrow biopsy 9/23/20, revealing early relapse with 11% blasts. She completed cycle 1 of decitabine + venetoclax 10/16/20.      (Not in a hospital admission)      Methotrexate analogues, Sulfa (sulfonamide antibiotics), and Bactrim [sulfamethoxazole-trimethoprim]     Past Medical History:   Diagnosis Date    Cancer 03/2018    AML    CHF (congestive heart failure)     Diarrhea 9/14/2018     Encounter for blood transfusion      Past Surgical History:   Procedure Laterality Date    BONE MARROW BIOPSY Left 12/27/2018    Procedure: Biopsy-bone marrow;  Surgeon: Laquita Troy MD;  Location: [facility] OR [address] [place] [address];  Service: Orthopedics;  Laterality: Left;    BONE MARROW BIOPSY Left 5/9/2019    Procedure: Biopsy-bone marrow;  Surgeon: Laquita Troy MD;  Location: [facility] OR [address] [place] [address];  Service: Orthopedics;  Laterality: Left;    CHOLECYSTECTOMY      HYSTERECTOMY      TONSILLECTOMY       Family History     Problem Relation (Age of Onset)    Cancer Mother, Father        Tobacco Use    Smoking status: Former Smoker     Packs/day: 0.50     Years: 10.00     Pack years: 5.00     Types: Cigarettes     Quit date: 3/25/2017     Years since quitting: 3.5    Smokeless tobacco: Never Used   Substance and Sexual Activity    Alcohol use: Not Currently     Alcohol/week: 1.0 standard drinks     Types: 1 Shots of liquor per week    Drug use: No    Sexual activity: Yes     Partners: Female       Review of Systems   Constitutional: Negative for chills and fever.   HENT: Positive for nosebleeds. Negative for sore throat and trouble swallowing.    Eyes: Negative for photophobia and visual disturbance.   Respiratory: Negative for cough and shortness of breath.    Cardiovascular: Negative for chest pain, palpitations and leg swelling.   Gastrointestinal: Negative for abdominal pain, diarrhea, nausea and vomiting.   Genitourinary: Negative for difficulty urinating and dysuria.   Musculoskeletal: Negative for back pain, neck pain and neck stiffness.   Skin: Negative for color change and rash.   Neurological: Positive for dizziness, weakness and light-headedness. Negative for headaches.   Psychiatric/Behavioral: Negative for confusion.     Objective:     Vital Signs (Most Recent):  Temp: 98.1 °F (36.7 °C) (10/27/20 1310)  Pulse: 78 (10/27/20 1500)  Resp: 18 (10/27/20 1500)  BP: (!) 103/56 (10/27/20 1500)  SpO2: 99 % (10/27/20  1500) Vital Signs (24h Range):  Temp:  [98.1 °F (36.7 °C)] 98.1 °F (36.7 °C)  Pulse:  [76-82] 78  Resp:  [18-20] 18  SpO2:  [99 %-100 %] 99 %  BP: ()/(51-57) 103/56     Weight: 83 kg (183 lb)  Body mass index is 33.47 kg/m².  Body surface area is 1.91 meters squared.    ECOG SCORE         [unfilled]    Lines/Drains/Airways     Peripheral Intravenous Line                 Peripheral IV - Single Lumen 10/18/19 0304 20 G Posterior;Right Forearm 375 days         Peripheral IV - Single Lumen 10/27/20 1457 20 G Left Hand less than 1 day                Physical Exam  Constitutional:       General: She is not in acute distress.     Appearance: She is not toxic-appearing or diaphoretic.   HENT:      Head: Normocephalic and atraumatic.      Mouth/Throat:      Mouth: Mucous membranes are dry.   Eyes:      General: No scleral icterus.        Right eye: No discharge.         Left eye: No discharge.   Neck:      Musculoskeletal: No neck rigidity.   Cardiovascular:      Rate and Rhythm: Normal rate and regular rhythm.   Pulmonary:      Effort: Pulmonary effort is normal. No respiratory distress.      Breath sounds: Normal breath sounds. No wheezing.   Abdominal:      General: Abdomen is flat. There is no distension.      Palpations: Abdomen is soft.      Tenderness: There is no abdominal tenderness.   Musculoskeletal:         General: No swelling or tenderness.   Skin:     General: Skin is warm and dry.      Coloration: Skin is pale.   Neurological:      General: No focal deficit present.      Mental Status: She is alert and oriented to person, place, and time.         Significant Labs:   CBC:   Recent Labs   Lab 10/27/20  1354   WBC 1.45*   HGB 7.3*   HCT 22.9*   PLT 9*   , CMP:   Recent Labs   Lab 10/27/20  1354      K 4.7      CO2 21*   *   BUN 13   CREATININE 1.2   CALCIUM 9.4   PROT 6.9   ALBUMIN 3.5   BILITOT 0.4   ALKPHOS 113   AST 15   ALT 8*   ANIONGAP 11   EGFRNONAA 46.2*    and All pertinent labs  from the last 24 hours have been reviewed.    Diagnostic Results:  I have reviewed all pertinent imaging results/findings within the past 24 hours.   CXR:  FINDINGS:  Cardiac size is not enlarged.  Aorta is tortuous and lungs are clear no acute findings.     Impression:     See above        Electronically signed by: Titus Goodson MD  Date:                                            10/27/2020  Time:                                           13:42    Assessment/Plan:     * Symptomatic anemia  70 y.o. F w/ CHF, relapse AML s/p cycle 1 of decitabine + venetoclax (10/16/20) presented to ED on 10/27/20 w/ 1 week of worsening fatigue, weakness, and dizziness. Non-toxic appearing and vitals wnl.     Hgb 7.3; plt 9  CMP wnl     Plan:  - keep hgb >7, plt >30 with nosebleed  - will obtain uric acid to rule out TLS; d-dimer and fibrinogen to rule out DIC  - keep patient under observation overnight    Sepsis  Presented with worsening fatigue/lethargy.   - BCx X 2  - UA + culture  - CXR clear  - Hold abx while no obvious signs of infection and hemodynamically stable    Lethargy  Likely 2/2 anemia and poor PO intake. Need to r/o infection.     - IVF overnight  - PT/OT  - diet with boost supplement    AML (acute myeloblastic leukemia)  Ms. Mac is patient of Dr. Troy with history of AML. Previously in remission s/p induction therapy x2 with 7+3 and FLAG-JENNIFER  with bone marrow biopsy done on 05/9/19 showing no morphologic evidence of residual AML. Had repeat bone marrow biopsy 9/23/20, revealing early relapse with 11% blasts. She completed cycle 1 of decitabine + venetoclax 10/16/20.     - hold venetoclax while inpatient  - continue levofloxacin/fluconazole/acyclovir for OI prophylaxis    Chemotherapy induced cardiomyopathy  Restart home metoprolol    Pancytopenia due to chemotherapy  SYMPTOMATIC ANEMIA    Insomnia  Takes xanax nightly for insomnia/anxiety. Will re-start while inpatient.         VTE Risk Mitigation (From  admission, onward)         Ordered     IP VTE HIGH RISK PATIENT  Once      10/27/20 1458     Place sequential compression device  Until discontinued      10/27/20 1458                Disposition: SAMANTHA Cisneros MD  Bone Marrow Transplant  Hematology  [facility]

## 2020-10-27 NOTE — ED PROVIDER NOTES
Encounter Date: 10/27/2020       History     Chief Complaint   Patient presents with    Multiple Complaints     relapse from leukemia, vomiting, nose bleeds sob, lethargy     70-year-old female presents with marked weakness with associated nausea, dyspnea on exertion, vomiting, epistaxis.  Symptoms started 4 days ago.  They have gradually gotten worse.        Review of patient's allergies indicates:   Allergen Reactions    Methotrexate analogues      Elevated Liver Enzyme    Sulfa (sulfonamide antibiotics)      Rash, nausea, vomiting    Bactrim [sulfamethoxazole-trimethoprim] Nausea And Vomiting and Rash     Past Medical History:   Diagnosis Date    Cancer 03/2018    AML    CHF (congestive heart failure)     Diarrhea 9/14/2018    Encounter for blood transfusion      Past Surgical History:   Procedure Laterality Date    BONE MARROW BIOPSY Left 12/27/2018    Procedure: Biopsy-bone marrow;  Surgeon: Laquita Troy MD;  Location: [facility] OR [address] [place] [address];  Service: Orthopedics;  Laterality: Left;    BONE MARROW BIOPSY Left 5/9/2019    Procedure: Biopsy-bone marrow;  Surgeon: Laquita Troy MD;  Location: [facility] OR [address] [place] [address];  Service: Orthopedics;  Laterality: Left;    CHOLECYSTECTOMY      HYSTERECTOMY      TONSILLECTOMY       Family History   Problem Relation Age of Onset    Cancer Mother     Cancer Father     Heart attack Neg Hx     Heart disease Neg Hx      Social History     Tobacco Use    Smoking status: Former Smoker     Packs/day: 0.50     Years: 10.00     Pack years: 5.00     Types: Cigarettes     Quit date: 3/25/2017     Years since quitting: 3.5    Smokeless tobacco: Never Used   Substance Use Topics    Alcohol use: Not Currently     Alcohol/week: 1.0 standard drinks     Types: 1 Shots of liquor per week    Drug use: No     Review of Systems   Constitutional: Negative for fever.   HENT: Negative for sore throat.    Respiratory: Positive for shortness of breath.    Cardiovascular: Negative for chest  pain.   Gastrointestinal: Positive for nausea.   Genitourinary: Negative for dysuria.   Musculoskeletal: Negative for back pain.   Skin: Positive for pallor. Negative for rash.   Neurological: Positive for weakness.   Hematological: Does not bruise/bleed easily.       Physical Exam     Initial Vitals [10/27/20 1310]   BP Pulse Resp Temp SpO2   (!) 121/51 82 18 98.1 °F (36.7 °C) 100 %      MAP       --         Physical Exam    Constitutional: No distress.   HENT:   Head: Normocephalic.   Eyes: EOM are normal. Pupils are equal, round, and reactive to light.   Conjunctiva pale   Neck: Normal range of motion. Neck supple. No JVD present.   Cardiovascular: Normal rate, regular rhythm and normal heart sounds.   Pulmonary/Chest: Breath sounds normal. No respiratory distress. She has no wheezes. She has no rales.   Abdominal: Soft. Bowel sounds are normal. She exhibits no distension. There is no abdominal tenderness.   Neurological: She is alert. She has normal strength. No sensory deficit. GCS score is 15. GCS eye subscore is 4. GCS verbal subscore is 5. GCS motor subscore is 6.   Skin: Capillary refill takes less than 2 seconds.   Psychiatric: She has a normal mood and affect.         ED Course   Procedures  Labs Reviewed   CBC W/ AUTO DIFFERENTIAL - Abnormal; Notable for the following components:       Result Value    WBC 1.45 (*)     RBC 2.31 (*)     Hemoglobin 7.3 (*)     Hematocrit 22.9 (*)     MCV 99 (*)     MCH 31.6 (*)     MCHC 31.9 (*)     RDW 15.6 (*)     Platelets 9 (*)     nRBC 1 (*)     Gran % 4.0 (*)     Lymph % 94.0 (*)     Mono % 2.0 (*)     All other components within normal limits    Narrative:       PLT critical result(s) called and verbal readback obtained from   YOLANDA STRINGER,NURSE by VICKY 10/27/2020 14:56  WBC critical result(s) called and verbal readback obtained from   Yolanda Stringer RN by MFG 10/27/2020 14:20   COMPREHENSIVE METABOLIC PANEL - Abnormal; Notable for the following components:    CO2 21  (*)     Glucose 128 (*)     ALT 8 (*)     eGFR if  53.3 (*)     eGFR if non  46.2 (*)     All other components within normal limits   URIC ACID   FIBRINOGEN   D DIMER, QUANTITATIVE   PROTIME-INR   APTT   SARS-COV-2 RDRP GENE   PREPARE PLATELETS (DOSE) SOFT        ECG Results          EKG 12-lead (Final result)  Result time 10/27/20 15:06:56    Final result by Interface, Lab In [facility] (10/27/20 15:06:56)                 Narrative:    Test Reason : R53.1,    Vent. Rate : 081 BPM     Atrial Rate : 081 BPM     P-R Int : 168 ms          QRS Dur : 076 ms      QT Int : 348 ms       P-R-T Axes : -16 -14 059 degrees     QTc Int : 404 ms    Normal sinus rhythm  Nonspecific T wave abnormality  Abnormal ECG  When compared with ECG of 06-OCT-2020 13:12,  Nonspecific T wave abnormality, worse in Lateral leads  QT has shortened  Confirmed by BARBARA GOLDMAN MD ([id number]) on 10/27/2020 3:06:48 PM    Referred By: [email or web address]   SELF           Confirmed By:BARBARA GOLDMAN MD                            Imaging Results          X-Ray Chest AP Portable (Final result)  Result time 10/27/20 13:42:25    Final result by Titus Goodson MD (10/27/20 13:42:25)                 Impression:      See above      Electronically signed by: Titus Goodson MD  Date:    10/27/2020  Time:    13:42             Narrative:    EXAMINATION:  XR CHEST AP PORTABLE    CLINICAL HISTORY:  weakness;    TECHNIQUE:  Single frontal view of the chest was performed.    COMPARISON:  10/11/2019    FINDINGS:  Cardiac size is not enlarged.  Aorta is tortuous and lungs are clear no acute findings.                                 Medical Decision Making:   History:   Old Medical Records: I decided to obtain old medical records.  Initial Assessment:   Patient with marked anemia on her labs.  She also is thrombocytopenic.  Will transfuse blood and platelets and admit to Hematology-Oncology Service  Clinical Tests:   Lab Tests: Ordered and  Reviewed  Radiological Study: Ordered and Reviewed  Medical Tests: Reviewed and Ordered  Other:   I have discussed this case with another health care provider.              Attending Attestation:         Attending Critical Care:   Critical Care Times:   Direct Patient Care (initial evaluation, reassessments, and time considering the case)................................................................22 minutes.   Additional History from reviewing old medical records or taking additional history from the family, EMS, PCP, etc.......................3 minutes.   Ordering, Reviewing, and Interpreting Diagnostic Studies...............................................................................................................3 minutes.   Documentation..................................................................................................................................................................................3 minutes.   Consultation with other Physicians. .................................................................................................................................................3 minutes.   ==============================================================  · Total Critical Care Time - exclusive of procedural time: 34 minutes.  ==============================================================                        Clinical Impression:       ICD-10-CM ICD-9-CM   1. Weakness  R53.1 780.79   2. Symptomatic anemia  D64.9 285.9                      Disposition:   Disposition: Admitted  Condition: Serious     ED Disposition Condition    Admit                             Hernandez Diaz MD  10/27/20 1806

## 2020-10-27 NOTE — ED TRIAGE NOTES
"Sabrina Mac, a 70 y.o. female presents to the ED w/ complaint of increase SOB and FRIED in the last week with intermittent n/v and epistasis.  Pt states that she has been able to control the nosebleeds and is more of a "drip".  Pt denies CP, fever/chills, dysuria or constipation/diarrhea.     Triage note:  Chief Complaint   Patient presents with    Multiple Complaints     relapse from leukemia, vomiting, nose bleeds sob, lethargy     Review of patient's allergies indicates:   Allergen Reactions    Methotrexate analogues      Elevated Liver Enzyme    Sulfa (sulfonamide antibiotics)      Rash, nausea, vomiting    Bactrim [sulfamethoxazole-trimethoprim] Nausea And Vomiting and Rash     Past Medical History:   Diagnosis Date    Cancer 03/2018    AML    CHF (congestive heart failure)     Diarrhea 9/14/2018    Encounter for blood transfusion          Patient identifiers verified and correct for Sabrina Mac.    LOC: The patient is awake, alert and aware of environment with an appropriate affect, the patient is oriented x 3 and speaking appropriately.  APPEARANCE: Patient resting comfortably and in no acute distress, patient is clean and well groomed, patient's clothing is properly fastened.  SKIN: The skin is warm and dry, pale in color, patient has tinting but moist mucus membranes, skin intact, no breakdown or bruising noted.  MUSCULOSKELETAL: Patient moving all extremities spontaneously, no obvious swelling or deformities noted. + general-moderate weakness.  RESPIRATORY: Airway is open and patent, respirations are spontaneous, patient has a normal effort and rate, no accessory muscle use noted, 99% RA but SOB and moderate-severe FRIED.  CARDIAC: Patient has a normal rate and regular rhythm, no periphreal edema noted, capillary refill < 3 seconds. HR 74  ABDOMEN: Soft and non tender to palpation, no distention noted, nausea and vomiting.  NEUROLOGIC: Eyes open spontaneously, behavior appropriate to " situation, follows commands.

## 2020-10-28 VITALS
TEMPERATURE: 99 F | WEIGHT: 183 LBS | RESPIRATION RATE: 17 BRPM | HEART RATE: 71 BPM | DIASTOLIC BLOOD PRESSURE: 55 MMHG | BODY MASS INDEX: 33.68 KG/M2 | OXYGEN SATURATION: 93 % | SYSTOLIC BLOOD PRESSURE: 102 MMHG | HEIGHT: 62 IN

## 2020-10-28 LAB
ALBUMIN SERPL BCP-MCNC: 3.1 G/DL (ref 3.5–5.2)
ALP SERPL-CCNC: 98 U/L (ref 55–135)
ALT SERPL W/O P-5'-P-CCNC: 7 U/L (ref 10–44)
ANION GAP SERPL CALC-SCNC: 10 MMOL/L (ref 8–16)
ANISOCYTOSIS BLD QL SMEAR: SLIGHT
AST SERPL-CCNC: 11 U/L (ref 10–40)
BACTERIA UR CULT: NO GROWTH
BASOPHILS # BLD AUTO: 0.01 K/UL (ref 0–0.2)
BASOPHILS NFR BLD: 0.9 % (ref 0–1.9)
BILIRUB SERPL-MCNC: 0.8 MG/DL (ref 0.1–1)
BUN SERPL-MCNC: 12 MG/DL (ref 8–23)
CALCIUM SERPL-MCNC: 8.7 MG/DL (ref 8.7–10.5)
CHLORIDE SERPL-SCNC: 108 MMOL/L (ref 95–110)
CO2 SERPL-SCNC: 21 MMOL/L (ref 23–29)
CREAT SERPL-MCNC: 1 MG/DL (ref 0.5–1.4)
DIFFERENTIAL METHOD: ABNORMAL
EOSINOPHIL # BLD AUTO: 0 K/UL (ref 0–0.5)
EOSINOPHIL NFR BLD: 0.9 % (ref 0–8)
ERYTHROCYTE [DISTWIDTH] IN BLOOD BY AUTOMATED COUNT: 18.1 % (ref 11.5–14.5)
EST. GFR  (AFRICAN AMERICAN): >60 ML/MIN/1.73 M^2
EST. GFR  (NON AFRICAN AMERICAN): 57.6 ML/MIN/1.73 M^2
GLUCOSE SERPL-MCNC: 97 MG/DL (ref 70–110)
HCT VFR BLD AUTO: 23.8 % (ref 37–48.5)
HGB BLD-MCNC: 7.7 G/DL (ref 12–16)
HYPOCHROMIA BLD QL SMEAR: ABNORMAL
IMM GRANULOCYTES # BLD AUTO: 0 K/UL (ref 0–0.04)
IMM GRANULOCYTES NFR BLD AUTO: 0 % (ref 0–0.5)
LYMPHOCYTES # BLD AUTO: 0.9 K/UL (ref 1–4.8)
LYMPHOCYTES NFR BLD: 82.4 % (ref 18–48)
MAGNESIUM SERPL-MCNC: 1.8 MG/DL (ref 1.6–2.6)
MCH RBC QN AUTO: 30.3 PG (ref 27–31)
MCHC RBC AUTO-ENTMCNC: 32.4 G/DL (ref 32–36)
MCV RBC AUTO: 94 FL (ref 82–98)
MONOCYTES # BLD AUTO: 0.1 K/UL (ref 0.3–1)
MONOCYTES NFR BLD: 10.2 % (ref 4–15)
NEUTROPHILS # BLD AUTO: 0.1 K/UL (ref 1.8–7.7)
NEUTROPHILS NFR BLD: 5.6 % (ref 38–73)
NRBC BLD-RTO: 2 /100 WBC
OVALOCYTES BLD QL SMEAR: ABNORMAL
PHOSPHATE SERPL-MCNC: 3.8 MG/DL (ref 2.7–4.5)
PLATELET # BLD AUTO: 36 K/UL (ref 150–350)
PLATELET BLD QL SMEAR: ABNORMAL
PMV BLD AUTO: 11.3 FL (ref 9.2–12.9)
POIKILOCYTOSIS BLD QL SMEAR: SLIGHT
POLYCHROMASIA BLD QL SMEAR: ABNORMAL
POTASSIUM SERPL-SCNC: 4 MMOL/L (ref 3.5–5.1)
PROT SERPL-MCNC: 6 G/DL (ref 6–8.4)
RBC # BLD AUTO: 2.54 M/UL (ref 4–5.4)
SODIUM SERPL-SCNC: 139 MMOL/L (ref 136–145)
WBC # BLD AUTO: 1.08 K/UL (ref 3.9–12.7)

## 2020-10-28 PROCEDURE — 99238 HOSP IP/OBS DSCHRG MGMT 30/<: CPT | Mod: ,,, | Performed by: INTERNAL MEDICINE

## 2020-10-28 PROCEDURE — 25000003 PHARM REV CODE 250: Performed by: STUDENT IN AN ORGANIZED HEALTH CARE EDUCATION/TRAINING PROGRAM

## 2020-10-28 PROCEDURE — 80053 COMPREHEN METABOLIC PANEL: CPT

## 2020-10-28 PROCEDURE — 20600001 HC STEP DOWN PRIVATE ROOM

## 2020-10-28 PROCEDURE — 97161 PT EVAL LOW COMPLEX 20 MIN: CPT

## 2020-10-28 PROCEDURE — 84100 ASSAY OF PHOSPHORUS: CPT

## 2020-10-28 PROCEDURE — 97535 SELF CARE MNGMENT TRAINING: CPT

## 2020-10-28 PROCEDURE — 36415 COLL VENOUS BLD VENIPUNCTURE: CPT

## 2020-10-28 PROCEDURE — 85025 COMPLETE CBC W/AUTO DIFF WBC: CPT

## 2020-10-28 PROCEDURE — 83735 ASSAY OF MAGNESIUM: CPT

## 2020-10-28 PROCEDURE — 97165 OT EVAL LOW COMPLEX 30 MIN: CPT

## 2020-10-28 PROCEDURE — 99238 PR HOSPITAL DISCHARGE DAY,<30 MIN: ICD-10-PCS | Mod: ,,, | Performed by: INTERNAL MEDICINE

## 2020-10-28 RX ADMIN — FLUCONAZOLE 200 MG: 200 TABLET ORAL at 08:10

## 2020-10-28 RX ADMIN — ACYCLOVIR 400 MG: 200 CAPSULE ORAL at 08:10

## 2020-10-28 RX ADMIN — PANTOPRAZOLE SODIUM 40 MG: 40 TABLET, DELAYED RELEASE ORAL at 08:10

## 2020-10-28 RX ADMIN — LEVOFLOXACIN 500 MG: 500 TABLET, FILM COATED ORAL at 08:10

## 2020-10-28 RX ADMIN — ESCITALOPRAM OXALATE 20 MG: 20 TABLET ORAL at 08:10

## 2020-10-28 NOTE — PROGRESS NOTES
Met with patient who reports no discharge needs and no home agency involvement.  Provided contact information and encouraged to call with any needs or concerns.  Safe discharge plan in place.  Family will transport home.  Will follow.

## 2020-10-28 NOTE — PROGRESS NOTES
10/27/20 2325   Vital Signs   Temp 98 °F (36.7 °C)   Temp src Oral   Pulse 76   Heart Rate Source Monitor   Resp 17   SpO2 96 %   Pulse Oximetry Type Intermittent   O2 Device (Oxygen Therapy) room air   BP (!) 86/44  (MD notified; RN at bedside to monitor)   MAP (mmHg) 61   BP Location Right arm   Patient Position Lying   1 unit RBC transfusion started. Decreased BP, asymptomatic. MD notified. Will stay by the bedside to monitor.

## 2020-10-28 NOTE — DISCHARGE SUMMARY
[facility]  Hematology  Bone Marrow Transplant  Discharge Summary      Patient Name: Sabrina Mac  MRN: [id number]  Admission Date: 10/27/2020  Hospital Length of Stay: 1 days  Discharge Date and Time: 10/28/2020 11:56 AM  Attending Physician: No att. providers found   Discharging Provider: Patty Quintana NP  Primary Care Provider: Primary Doctor Earnestine    HPI: Patient is 70 y.o. F w/ CHF, relapse AML s/p cycle 1 of decitabine + venetoclax (10/16/20) presented to ED on 10/27/20 w/ 1 week of worsening fatigue, weakness, and dizziness. Patient reports that her fatigue has significantly worsened in the past 4-5 days, and is SOB after walking for about 20 feet. Also reports dizziness when standing up. Denies any falls/trauma/syncope. Had an episode of nose bleed prior to admission that stopped spontaneously. Appetite has been decreased as well. Reports h/o V-tach which is controlled on daily metoprolol taken at 4PM. Denies any chest pain/palpitations. Denies night sweats/fevers. Denies HA, confusion, neck stiffness, dysuria, flank pain, rashes. Denies swelling.     Ms. Mac is patient of Dr. Troy with history of AML. Previously in remission s/p induction therapy x2 with 7+3 and FLAG-JENNIFER  with bone marrow biopsy done on 05/9/19 showing no morphologic evidence of residual AML. Had repeat bone marrow biopsy 9/23/20, revealing early relapse with 11% blasts. She completed cycle 1 of decitabine + venetoclax 10/16/20.     * No surgery found *     Hospital Course: Admitted 10/27/20 with symptomatic anemia. She is s/p C1 of dectiabine + venetoclax for relapsed AML. Hgb 6.5 on presentation. Received 1 unit prbc. hgb 7.7 and patient feeling well today. Discharged home 10/28/20. Has labs scheduled locally on Friday and twice weekly thereafter until next f/u with Dr. Troy.     Symptomatic anemia  70 y.o. F w/ CHF, relapse AML s/p cycle 1 of decitabine + venetoclax (10/16/20) presented to ED on 10/27/20 w/ 1  week of worsening fatigue, weakness, and dizziness. Non-toxic appearing and vitals wnl.      Hgb 7.3; plt 9  CMP wnl      Plan:  - keep hgb >7, plt >30 with nosebleed  - will obtain uric acid to rule out TLS; d-dimer and fibrinogen to rule out DIC  - keep patient under observation overnight     Sepsis  Presented with worsening fatigue/lethargy.   - BCx X 2  - UA + culture  - CXR clear  - Hold abx while no obvious signs of infection and hemodynamically stable     Lethargy  Likely 2/2 anemia and poor PO intake. Need to r/o infection.      - IVF overnight  - PT/OT  - diet with boost supplement     AML (acute myeloblastic leukemia)  Ms. Mac is patient of Dr. Troy with history of AML. Previously in remission s/p induction therapy x2 with 7+3 and FLAG-JENNIFER  with bone marrow biopsy done on 05/9/19 showing no morphologic evidence of residual AML. Had repeat bone marrow biopsy 9/23/20, revealing early relapse with 11% blasts. She completed cycle 1 of decitabine + venetoclax 10/16/20.      - hold venetoclax while inpatient  - continue levofloxacin/fluconazole/acyclovir for OI prophylaxis     Chemotherapy induced cardiomyopathy  Restart home metoprolol     Pancytopenia due to chemotherapy  SYMPTOMATIC ANEMIA     Insomnia  Takes xanax nightly for insomnia/anxiety. Will re-start while inpatient.     Significant Diagnostic Studies: Labs:   CMP   Recent Labs   Lab 10/27/20  1354 10/28/20  0430    139   K 4.7 4.0    108   CO2 21* 21*   * 97   BUN 13 12   CREATININE 1.2 1.0   CALCIUM 9.4 8.7   PROT 6.9 6.0   ALBUMIN 3.5 3.1*   BILITOT 0.4 0.8   ALKPHOS 113 98   AST 15 11   ALT 8* 7*   ANIONGAP 11 10   ESTGFRAFRICA 53.3* >60.0   EGFRNONAA 46.2* 57.6*    and CBC   Recent Labs   Lab 10/27/20  1354 10/27/20  1942 10/28/20  0430   WBC 1.45* 1.25* 1.08*   HGB 7.3* 6.5* 7.7*   HCT 22.9* 20.0* 23.8*   PLT 9* 42* 36*       Pending Diagnostic Studies:     None        Final Active Diagnoses:    Diagnosis Date Noted POA     PRINCIPAL PROBLEM:  Symptomatic anemia [D64.9] 10/27/2020 Unknown    Pancytopenia due to chemotherapy [D61.810] 06/13/2018 Yes    Lethargy [R53.83] 10/27/2020 Yes    Sepsis [A41.9] 10/27/2020 Unknown    AML (acute myeloblastic leukemia) [C92.00] 08/14/2018 Yes    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, T45.1X5A] 07/03/2018 Yes    Insomnia [G47.00] 05/28/2018 Yes      Problems Resolved During this Admission:      Discharged Condition: stable    Disposition: Home or Self Care    Follow Up:    Patient Instructions:      Diet Adult Regular     Notify your health care provider if you experience any of the following:  temperature >100.4     Notify your health care provider if you experience any of the following:  persistent nausea and vomiting or diarrhea     Notify your health care provider if you experience any of the following:  severe uncontrolled pain     Notify your health care provider if you experience any of the following:  redness, tenderness, or signs of infection (pain, swelling, redness, odor or green/yellow discharge around incision site)     Notify your health care provider if you experience any of the following:  difficulty breathing or increased cough     Notify your health care provider if you experience any of the following:  severe persistent headache     Notify your health care provider if you experience any of the following:  persistent dizziness, light-headedness, or visual disturbances     Notify your health care provider if you experience any of the following:  increased confusion or weakness     Activity as tolerated     Medications:  Reconciled Home Medications:      Medication List      CHANGE how you take these medications    famotidine 40 MG tablet  Commonly known as: PEPCID  Take 1 tablet (40 mg total) by mouth nightly.  What changed: Another medication with the same name was removed. Continue taking this medication, and follow the directions you see here.     LORazepam 1 MG  tablet  Commonly known as: ATIVAN  Take 1 tablet (1 mg total) by mouth 2 (two) times daily.  What changed: Another medication with the same name was removed. Continue taking this medication, and follow the directions you see here.     ondansetron 8 MG tablet  Commonly known as: ZOFRAN  What changed: Another medication with the same name was removed. Continue taking this medication, and follow the directions you see here.        CONTINUE taking these medications    acyclovir 400 MG tablet  Commonly known as: ZOVIRAX  Take 1 tablet (400 mg total) by mouth 2 (two) times daily.     clotrimazole 10 mg sultana  Commonly known as: MYCELEX     diphenhydrAMINE 50 MG capsule  Commonly known as: BENADRYL  Take 50 mg by mouth nightly as needed for Itching or Insomnia.     escitalopram oxalate 20 MG tablet  Commonly known as: LEXAPRO     fluconazole 200 MG Tab  Commonly known as: DIFLUCAN  Take 1 tablet (200 mg total) by mouth once daily.     LEVAQUIN 500 MG tablet  Generic drug: levoFLOXacin  Take 1 tablet (500 mg total) by mouth once daily.     metoprolol succinate 25 MG 24 hr tablet  Commonly known as: TOPROL-XL  TAKE ONE TABLET BY MOUTH ONCE DAILY     ofloxacin 0.3 % otic solution  Commonly known as: FLOXIN  Place 5 drops into the left ear once daily.     omeprazole 40 MG capsule  Commonly known as: PRILOSEC  Take 1 capsule (40 mg total) by mouth before breakfast. Take on empty stomach 30-60 minutes before meal     * predniSONE 10 MG tablet  Commonly known as: DELTASONE  TAKE ONE TABLET BY MOUTH ONCE DAILY AS NEEDED FOR ARTHRITIS FLARE     * predniSONE 20 MG tablet  Commonly known as: DELTASONE  Take 1 tablet (20 mg total) by mouth once daily.     traMADoL 50 mg tablet  Commonly known as: ULTRAM  Take 1 tablet (50 mg total) by mouth every 6 (six) hours as needed for Pain.     venetoclax 100 mg Tab  Commonly known as: VENCLEXTA  Take 2 tablets (200 mg total) by mouth once daily.     walker Misc  Commonly known as: ULTRA-LIGHT  ROLLATOR  1 Units by Misc.(Non-Drug; Combo Route) route once daily.         * This list has 2 medication(s) that are the same as other medications prescribed for you. Read the directions carefully, and ask your doctor or other care provider to review them with you.            STOP taking these medications    ALPRAZolam 0.5 MG tablet  Commonly known as: XANAX     ALPRAZolam 1 MG tablet  Commonly known as: XANAX     ibuprofen 600 MG tablet  Commonly known as: ADVIL,MOTRIN     omeprazole 20 MG tablet  Commonly known as: PRILOSEC OTC     oxyCODONE-acetaminophen 7.5-325 mg per tablet  Commonly known as: PERCOCET            Patty Quintana NP  Bone Marrow Transplant  [facility]

## 2020-10-28 NOTE — PT/OT/SLP EVAL
Physical Therapy Evaluation and Discharge Note    Patient Name:  Sabrina Mac   MRN:  [id number]    Recommendations:     Discharge Recommendations:  home   Discharge Equipment Recommendations: none   Barriers to discharge: None    Assessment:     Sabrina Mac is a 70 y.o. female admitted with a medical diagnosis of Symptomatic anemia. .  At this time, patient is functioning at their prior level of function and does not require further acute PT services.     Recent Surgery: * No surgery found *      Plan:     During this hospitalization, patient does not require further acute PT services.  Please re-consult if situation changes.      Subjective     Chief Complaint: weakness  Patient/Family Comments/goals: to go home  Pain/Comfort:  · Pain Rating 1: 0/10  · Pain Rating Post-Intervention 1: 0/10    Patients cultural, spiritual, Orthodoxy conflicts given the current situation: no    Living Environment:  Pt. Lives with spouse in [facility]  Prior to admission, patients level of function was indep.  Equipment used at home: cane, straight, walker, rolling.  Upon discharge, patient will have assistance from spouse.    Objective:     Communicated with nursing prior to session.  Patient found supine with peripheral IV upon PT entry to room.    General Precautions: Standard, fall   Orthopedic Precautions:N/A   Braces:       Exams:  · RLE ROM: WFL  · RLE Strength: WFL  · LLE ROM: WFL  · LLE Strength: WFL    Functional Mobility:  · Bed Mobility:     · Rolling Left:  supervision  · Scooting: supervision  · Supine to Sit: supervision  · Sit to Supine: supervision  · Transfers:     · Sit to Stand:  supervision with no AD  · Gait: 120' with SBA without AD or LOB  · Balance: good    AM-PAC 6 CLICK MOBILITY  Total Score:22       Therapeutic Activities and Exercises:   Discussed therapy needs, goals, and POC    AM-PAC 6 CLICK MOBILITY  Total Score:22     Patient left supine with all lines intact and call button in reach.    GOALS:    Multidisciplinary Problems     Physical Therapy Goals     Not on file          Multidisciplinary Problems (Resolved)        Problem: Physical Therapy Goal    Goal Priority Disciplines Outcome Goal Variances Interventions   Physical Therapy Goal   (Resolved)     PT, PT/OT Met                     History:     Past Medical History:   Diagnosis Date    Cancer 03/2018    AML    CHF (congestive heart failure)     Diarrhea 9/14/2018    Encounter for blood transfusion        Past Surgical History:   Procedure Laterality Date    BONE MARROW BIOPSY Left 12/27/2018    Procedure: Biopsy-bone marrow;  Surgeon: Laquita Troy MD;  Location: [address];  Service: Orthopedics;  Laterality: Left;    BONE MARROW BIOPSY Left 5/9/2019    Procedure: Biopsy-bone marrow;  Surgeon: Laquita Troy MD;  Location: [address];  Service: Orthopedics;  Laterality: Left;    CHOLECYSTECTOMY      HYSTERECTOMY      TONSILLECTOMY         Time Tracking:     PT Received On: 10/28/20  PT Start Time: 0957     PT Stop Time: 1005  PT Total Time (min): 8 min     Billable Minutes: Evaluation 8      Federico Meier, PT  10/28/2020

## 2020-10-28 NOTE — PLAN OF CARE
Patient involved with plan of care and communicated needs throughout shift. AAOx4. 1 unit RBC given. Decreased BP, resolved during RBC transfusion. No complaints of pain. IVF re-connected and to be infused for 10 hours. Patient voids in hat and ambulates independently but instructed to call for additional needs. Nonskid socks in use, bed locked in lowest position, and belongings as well as call light within reach. Will continue to monitor with hourly rounds and clustered care to promote rest.

## 2020-10-28 NOTE — PT/OT/SLP EVAL
Occupational Therapy   Evaluation and Discharge Note    Name: Sabrina Mac  MRN: [id number]  Admitting Diagnosis:  Symptomatic anemia      Recommendations:     Discharge Recommendations: home  Discharge Equipment Recommendations:  none  Barriers to discharge:  None    Assessment:     Sabrina Mac is a 70 y.o. female with a medical diagnosis of Symptomatic anemia. At this time, patient is functioning at their prior level of function and does not require further acute OT services.     Plan:     During this hospitalization, patient does not require further acute OT services.  Please re-consult if situation changes.    · Plan of Care Reviewed with: patient    Subjective     Chief Complaint: no complaints   Patient/Family Comments/goals: return to home     Occupational Profile:  Living Environment: Pt lives with spouse in 1 story house with no steps to enter   Previous level of function: Pt indep prior to admission   Equipment Used at home:  walker, rolling, cane, straight  Assistance upon Discharge: spouse able to assist     Pain/Comfort:  · Pain Rating 1: 0/10    Patients cultural, spiritual, Alevism conflicts given the current situation: no    Objective:     Communicated with: RN prior to session.  Patient found supine with peripheral IV upon OT entry to room.    General Precautions: Standard, fall   Orthopedic Precautions:N/A   Braces: N/A     Occupational Performance:    Bed Mobility:    · Patient completed Rolling/Turning to Right with independence  · Patient completed Scooting/Bridging with independence  · Patient completed Supine to Sit with independence  · Patient completed Sit to Supine with independence    Functional Mobility/Transfers:  · Patient completed Sit <> Stand Transfer with stand by assistance  with  no assistive device   · Patient completed Bed <> Chair Transfer using Step Transfer technique with stand by assistance with no assistive device  · Patient completed Toilet Transfer Step Transfer  technique with stand by assistance with  no AD      Activities of Daily Living:  · Pt indep with selfcare performance     Cognitive/Visual Perceptual:  Cognitive/Psychosocial Skills:     -       Oriented to: Person, Place, Time and Situation   -       Follows Commands/attention:Follows two-step commands  -       Communication: clear/fluent  -       Memory: No Deficits noted  -       Safety awareness/insight to disability: intact   -       Mood/Affect/Coping skills/emotional control: Appropriate to situation    Physical Exam:  Upper Extremity Range of Motion:     -       Right Upper Extremity: WFL  -       Left Upper Extremity: WFL  Upper Extremity Strength:    -       Right Upper Extremity: WFL  -       Left Upper Extremity: WFL    AMPAC 6 Click ADL:  AMPAC Total Score: 24    Treatment & Education:  Evaluation complete. Pt educated on safety, role of OT, importance of increased participation in self care for gains , expectations for participation, expectations for gains, POC, energy conservation, caregiver strain. White board updated.   - ADL training for safety   Education:    Patient left supine with all lines intact    GOALS:   Multidisciplinary Problems     Occupational Therapy Goals     Not on file                History:     Past Medical History:   Diagnosis Date    Cancer 03/2018    AML    CHF (congestive heart failure)     Diarrhea 9/14/2018    Encounter for blood transfusion        Past Surgical History:   Procedure Laterality Date    BONE MARROW BIOPSY Left 12/27/2018    Procedure: Biopsy-bone marrow;  Surgeon: Laquita Troy MD;  Location: [address];  Service: Orthopedics;  Laterality: Left;    BONE MARROW BIOPSY Left 5/9/2019    Procedure: Biopsy-bone marrow;  Surgeon: Laquita Troy MD;  Location: [address];  Service: Orthopedics;  Laterality: Left;    CHOLECYSTECTOMY      HYSTERECTOMY      TONSILLECTOMY         Time Tracking:     OT Date of Treatment: 10/28/20  OT Start Time:  0925  OT Stop Time: 0945  OT Total Time (min): 20 min    Billable Minutes:Evaluation 10  Self Care/Home Management 10    Francy Talavera, OT  10/28/2020

## 2020-10-28 NOTE — PLAN OF CARE
POC reviewed with patient, questions answered and concerns addressed. VSS, no active bleeding noted, received 1 U platelets without difficulty. No c/o pain. Has exertional dyspnea but O2sat stable. In no acute distress; WCM.

## 2020-10-28 NOTE — NURSING
ROADTEST  O-oxygen saturation 96-98% on room air.  A-ambulating independently in room.  D-PIV D/C'ed LH, catheter tip intact.  T-tolerating regular diet without difficulty.  E-voiding qs cyu.  S-able to do ADL's independently.  T-AVS reviewed with patient using the teach-back method. Ready for discharge; waiting on family to pick her up.

## 2020-10-28 NOTE — PROGRESS NOTES
10/28/20 0321   Vital Signs   Temp 98.7 °F (37.1 °C)   Temp src Oral   Pulse 66   Heart Rate Source Monitor   Resp 19   SpO2 96 %   Pulse Oximetry Type Intermittent   O2 Device (Oxygen Therapy) room air   BP (!) 100/50   MAP (mmHg) 72   BP Location Right arm   Patient Position Lying   Assessments (Pre/Post)   Level of Consciousness (AVPU) alert   End of blood transfusion. Patient tolerated well, no complaints.

## 2020-10-29 ENCOUNTER — TELEPHONE (OUTPATIENT)
Dept: HEMATOLOGY/ONCOLOGY | Facility: CLINIC | Age: 70
End: 2020-10-29

## 2020-10-29 NOTE — TELEPHONE ENCOUNTER
----- Message from Reyna Mena sent at 10/29/2020 12:59 PM CDT -----  Regarding: Lois assistance application  Hello    I am follow up to confirm if you received the assistance application I faxed for the patients Nicolextjacey to [phone]. Please fax signed paperwork back to my attention [phone]. If you would like me to fax the form to another number please let me know.  Thank you  Joleen  [id number]

## 2020-10-30 ENCOUNTER — LAB VISIT (OUTPATIENT)
Dept: LAB | Facility: HOSPITAL | Age: 70
End: 2020-10-30
Attending: INTERNAL MEDICINE
Payer: MEDICARE

## 2020-10-30 DIAGNOSIS — R10.9 ABDOMINAL PAIN, UNSPECIFIED ABDOMINAL LOCATION: ICD-10-CM

## 2020-10-30 DIAGNOSIS — C92.00 AML (ACUTE MYELOBLASTIC LEUKEMIA): ICD-10-CM

## 2020-10-30 DIAGNOSIS — C92.02 ACUTE MYELOID LEUKEMIA IN RELAPSE: ICD-10-CM

## 2020-10-30 LAB
ABO + RH BLD: NORMAL
ALBUMIN SERPL BCP-MCNC: 4.1 G/DL (ref 3.5–5.2)
ALP SERPL-CCNC: 97 U/L (ref 38–145)
ALT SERPL W/O P-5'-P-CCNC: 10 U/L (ref 0–35)
ANION GAP SERPL CALC-SCNC: 9 MMOL/L (ref 8–16)
AST SERPL-CCNC: 22 U/L (ref 14–36)
BILIRUB SERPL-MCNC: 0.6 MG/DL (ref 0.2–1.3)
BLD GP AB SCN CELLS X3 SERPL QL: NORMAL
BUN SERPL-MCNC: 14 MG/DL (ref 7–18)
CALCIUM SERPL-MCNC: 9.4 MG/DL (ref 8.4–10.2)
CHLORIDE SERPL-SCNC: 103 MMOL/L (ref 95–110)
CO2 SERPL-SCNC: 27 MMOL/L (ref 22–31)
CREAT SERPL-MCNC: 1 MG/DL (ref 0.5–1.4)
ERYTHROCYTE [DISTWIDTH] IN BLOOD BY AUTOMATED COUNT: 17.3 % (ref 11.5–14.5)
EST. GFR  (AFRICAN AMERICAN): >60 ML/MIN/1.73 M^2
EST. GFR  (NON AFRICAN AMERICAN): 58 ML/MIN/1.73 M^2
GLUCOSE SERPL-MCNC: 109 MG/DL (ref 70–110)
HCT VFR BLD AUTO: 26.7 % (ref 37–48.5)
HGB BLD-MCNC: 8.7 G/DL (ref 12–16)
IMM GRANULOCYTES # BLD AUTO: 0 K/UL (ref 0–0.04)
MCH RBC QN AUTO: 30.6 PG (ref 27–31)
MCHC RBC AUTO-ENTMCNC: 32.6 G/DL (ref 32–36)
MCV RBC AUTO: 94 FL (ref 82–98)
NEUTROPHILS # BLD AUTO: 0.1 K/UL (ref 1.8–7.7)
PLATELET # BLD AUTO: 22 K/UL (ref 150–350)
PMV BLD AUTO: 10.8 FL (ref 9.2–12.9)
POTASSIUM SERPL-SCNC: 3.8 MMOL/L (ref 3.5–5.1)
PROT SERPL-MCNC: 7.1 G/DL (ref 6–8.4)
RBC # BLD AUTO: 2.84 M/UL (ref 4–5.4)
SODIUM SERPL-SCNC: 139 MMOL/L (ref 136–145)
WBC # BLD AUTO: 1.25 K/UL (ref 3.9–12.7)

## 2020-10-30 PROCEDURE — 36415 COLL VENOUS BLD VENIPUNCTURE: CPT | Mod: PN

## 2020-10-30 PROCEDURE — 85027 COMPLETE CBC AUTOMATED: CPT

## 2020-10-30 PROCEDURE — 85027 COMPLETE CBC AUTOMATED: CPT | Mod: PN

## 2020-10-30 PROCEDURE — 80053 COMPREHEN METABOLIC PANEL: CPT | Mod: PN

## 2020-10-30 PROCEDURE — 86850 RBC ANTIBODY SCREEN: CPT | Mod: PN

## 2020-10-30 PROCEDURE — 80053 COMPREHEN METABOLIC PANEL: CPT

## 2020-10-30 PROCEDURE — 86901 BLOOD TYPING SEROLOGIC RH(D): CPT

## 2020-10-30 NOTE — PHYSICIAN QUERY
PT Name: Sabrina Mac  MR #: [id number]     Documentation Clarification      CDS: Joi Barrios RN      Contact information:[email or web address] or (cell) [phone]    This form is a permanent document in the medical record.     Query Date: October 30, 2020    By submitting this query, we are merely seeking further clarification of documentation. Please utilize your independent clinical judgment when addressing the question(s) below.    The Medical Record reflects the following:    Clinical Findings Location in Medical Records   Sepsis  Presented with worsening fatigue/lethargy.   - BCx X 2  - UA + culture  - CXR clear  - Hold abx while no obvious signs of infection and hemodynamically stable   H&P 10/27    10/27  13:54 10/27  19:42 10/28  04:30   WBC 1.45  1.25  1.08      Blood and Urine Culture 10/27- NGTD     10/27  14:33   SARS-CoV-2  Negative      10/27/  18:26   Specimen Urine, Clean Catch   Color Straw   Appearance Clear   Specific Gravity 1.005   pH 6.0   Protein Negative   Glucose Negative   Ketones Negative   Occult Blood 1+ (A)   NITRITE  Negative   Bilirubin  Negative   Leukocytes Negative   RBC 1   WBC 0      Lab Results         Micro    ID          Urinalysis   10/27 @ 1310: /51, HR 82, RR 18, Temp 98.1, SpO2 100%  10/27 @ 1500: /56, HR 78, RR 18, Temp 98.1, SpO2 99%  10/28 @ 0321: /50, HR 66, RR 19, Temp 98.7, SpO2 96%   Vital Signs                                                                                Provider, please clarify the diagnosis of Sepsis:      [   ] Diagnosis is confirmed and additional clinical support/decision-making indicators for the diagnosis include (please specify):________________     [ x  ] Above stated diagnosis is not confirmed and/or it has been ruled out     [   ] Above stated diagnosis is not confirmed and/or it has been ruled out, other diagnosis ruled in (please specify):_______________     [   ] Other clarification (please specify):  ___________________     [  ] Clinically undetermined

## 2020-10-30 NOTE — PHYSICIAN QUERY
PT Name: Sabrina Mac  MR #: [id number]    HEMATOLOGY CLARIFICATION      CDS: Joi Barrios RN      Contact information:[email or web address] or (cell) [phone]    This form is a permanent document in the medical record.      Query Date: October 30, 2020    By submitting this query, we are merely seeking further clarification of documentation. Please utilize your independent clinical judgment when addressing the question(s) below.    The Medical Record contains the following:   Indicators  Supporting Clinical Findings Location in Medical Record   x Anemia documented Symptomatic anemia  70 y.o. F w/ CHF, relapse AML s/p cycle 1 of decitabine + venetoclax (10/16/20) presented to ED on 10/27/20 w/ 1 week of worsening fatigue, weakness, and dizziness. Non-toxic appearing and vitals wnl.      Hgb 7.3; plt 9  CMP wnl      Plan:  - keep hgb >7, plt >30 with nosebleed  - will obtain uric acid to rule out TLS; d-dimer and fibrinogen to rule out DIC  - keep patient under observation overnight   H&P 10/27   x H&H  10/27  13:54 10/27  19:42 10/28  04:30   Hgb 7.3  6.5 7.7    Hct 22.9 20.0  23.8       Lab Results     BP                    HR      GI bleeding documented      Acute bleeding (Non GI site)     x Transfusion(s)  10/27  16:26   PRBCs Rpt   Product  [id number]   DISPENSE TRANSFUSED      Blood Bank   x Acute/Chronic illness AML (acute myeloblastic leukemia)  Ms. Mac is patient of Dr. Troy with history of AML. Previously in remission s/p induction therapy x2 with 7+3 and FLAG-JENNIFER  with bone marrow biopsy done on 05/9/19 showing no morphologic evidence of residual AML. Had repeat bone marrow biopsy 9/23/20, revealing early relapse with 11% blasts. She completed cycle 1 of decitabine + venetoclax 10/16/20.      - hold venetoclax while inpatient  - continue levofloxacin/fluconazole/acyclovir for OI prophylaxis H&P 10/27    Treatments      Other       Provider, please specify diagnosis or diagnoses associated  with above clinical findings.     [   ] Anemia due to neoplastic disease     [ x  ] Anemia due to antineoplastic chemotherapy     [   ] Anemia due to chronic disease (please specify): _________________     [   ] Anemia, unspecified      [   ] Other Hematological Diagnosis (please specify): _________________     [   ] Clinically Undetermined     Form No. [id number]

## 2020-11-01 LAB
BACTERIA BLD CULT: NORMAL
BACTERIA BLD CULT: NORMAL

## 2020-11-06 ENCOUNTER — TELEPHONE (OUTPATIENT)
Dept: HEMATOLOGY/ONCOLOGY | Facility: CLINIC | Age: 70
End: 2020-11-06

## 2020-11-09 ENCOUNTER — INFUSION (OUTPATIENT)
Dept: INFUSION THERAPY | Facility: HOSPITAL | Age: 70
End: 2020-11-09
Payer: MEDICARE

## 2020-11-09 ENCOUNTER — OFFICE VISIT (OUTPATIENT)
Dept: HEMATOLOGY/ONCOLOGY | Facility: CLINIC | Age: 70
End: 2020-11-09
Payer: MEDICARE

## 2020-11-09 VITALS
DIASTOLIC BLOOD PRESSURE: 52 MMHG | RESPIRATION RATE: 18 BRPM | TEMPERATURE: 99 F | HEART RATE: 84 BPM | WEIGHT: 180.69 LBS | SYSTOLIC BLOOD PRESSURE: 105 MMHG | OXYGEN SATURATION: 98 % | HEIGHT: 62 IN | BODY MASS INDEX: 33.25 KG/M2

## 2020-11-09 VITALS
TEMPERATURE: 99 F | SYSTOLIC BLOOD PRESSURE: 108 MMHG | DIASTOLIC BLOOD PRESSURE: 54 MMHG | RESPIRATION RATE: 18 BRPM | HEART RATE: 74 BPM

## 2020-11-09 DIAGNOSIS — D61.810 PANCYTOPENIA DUE TO CHEMOTHERAPY: ICD-10-CM

## 2020-11-09 DIAGNOSIS — C92.02 ACUTE MYELOID LEUKEMIA IN RELAPSE: Primary | ICD-10-CM

## 2020-11-09 DIAGNOSIS — D64.9 SYMPTOMATIC ANEMIA: ICD-10-CM

## 2020-11-09 DIAGNOSIS — I42.7 CHEMOTHERAPY INDUCED CARDIOMYOPATHY: ICD-10-CM

## 2020-11-09 DIAGNOSIS — K12.31 MUCOSITIS DUE TO CHEMOTHERAPY: ICD-10-CM

## 2020-11-09 DIAGNOSIS — Z86.79 HISTORY OF VENTRICULAR FIBRILLATION: ICD-10-CM

## 2020-11-09 DIAGNOSIS — C93.01 ACUTE MONOCYTIC LEUKEMIA IN REMISSION: ICD-10-CM

## 2020-11-09 DIAGNOSIS — E87.8 ELECTROLYTE ABNORMALITY: ICD-10-CM

## 2020-11-09 DIAGNOSIS — T45.1X5A CHEMOTHERAPY INDUCED CARDIOMYOPATHY: ICD-10-CM

## 2020-11-09 PROCEDURE — A4216 STERILE WATER/SALINE, 10 ML: HCPCS | Performed by: INTERNAL MEDICINE

## 2020-11-09 PROCEDURE — 99215 PR OFFICE/OUTPT VISIT, EST, LEVL V, 40-54 MIN: ICD-10-PCS | Mod: S$PBB,,, | Performed by: INTERNAL MEDICINE

## 2020-11-09 PROCEDURE — 63600175 PHARM REV CODE 636 W HCPCS: Mod: JG | Performed by: INTERNAL MEDICINE

## 2020-11-09 PROCEDURE — 96413 CHEMO IV INFUSION 1 HR: CPT

## 2020-11-09 PROCEDURE — 25000003 PHARM REV CODE 250: Performed by: INTERNAL MEDICINE

## 2020-11-09 PROCEDURE — 99215 OFFICE O/P EST HI 40 MIN: CPT | Mod: S$PBB,,, | Performed by: INTERNAL MEDICINE

## 2020-11-09 PROCEDURE — 99999 PR PBB SHADOW E&M-EST. PATIENT-LVL IV: CPT | Mod: PBBFAC,,, | Performed by: INTERNAL MEDICINE

## 2020-11-09 PROCEDURE — 99214 OFFICE O/P EST MOD 30 MIN: CPT | Mod: PBBFAC,25 | Performed by: INTERNAL MEDICINE

## 2020-11-09 PROCEDURE — 99999 PR PBB SHADOW E&M-EST. PATIENT-LVL IV: ICD-10-PCS | Mod: PBBFAC,,, | Performed by: INTERNAL MEDICINE

## 2020-11-09 RX ORDER — HEPARIN 100 UNIT/ML
500 SYRINGE INTRAVENOUS
Status: DISCONTINUED | OUTPATIENT
Start: 2020-11-09 | End: 2020-11-09 | Stop reason: HOSPADM

## 2020-11-09 RX ORDER — SODIUM CHLORIDE 0.9 % (FLUSH) 0.9 %
10 SYRINGE (ML) INJECTION
Status: CANCELLED | OUTPATIENT
Start: 2020-11-09

## 2020-11-09 RX ORDER — ALPRAZOLAM 2 MG/1
2 TABLET ORAL NIGHTLY PRN
Qty: 30 TABLET | Refills: 0 | Status: SHIPPED | OUTPATIENT
Start: 2020-11-09 | End: 2021-03-05 | Stop reason: SDUPTHER

## 2020-11-09 RX ORDER — SODIUM CHLORIDE 0.9 % (FLUSH) 0.9 %
10 SYRINGE (ML) INJECTION
Status: DISCONTINUED | OUTPATIENT
Start: 2020-11-09 | End: 2020-11-09 | Stop reason: HOSPADM

## 2020-11-09 RX ORDER — FLUCONAZOLE 200 MG/1
TABLET ORAL
COMMUNITY
Start: 2020-11-04 | End: 2023-03-14

## 2020-11-09 RX ORDER — SODIUM CHLORIDE 0.9 % (FLUSH) 0.9 %
10 SYRINGE (ML) INJECTION
Status: CANCELLED | OUTPATIENT
Start: 2020-11-13

## 2020-11-09 RX ORDER — HEPARIN 100 UNIT/ML
500 SYRINGE INTRAVENOUS
Status: CANCELLED | OUTPATIENT
Start: 2020-11-10

## 2020-11-09 RX ORDER — HEPARIN 100 UNIT/ML
500 SYRINGE INTRAVENOUS
Status: CANCELLED | OUTPATIENT
Start: 2020-11-09

## 2020-11-09 RX ORDER — SODIUM CHLORIDE 0.9 % (FLUSH) 0.9 %
10 SYRINGE (ML) INJECTION
Status: CANCELLED | OUTPATIENT
Start: 2020-11-11

## 2020-11-09 RX ORDER — PROCHLORPERAZINE MALEATE 10 MG
10 TABLET ORAL EVERY 6 HOURS PRN
Qty: 60 TABLET | Refills: 1 | Status: SHIPPED | OUTPATIENT
Start: 2020-11-09 | End: 2021-11-09

## 2020-11-09 RX ORDER — SODIUM CHLORIDE 0.9 % (FLUSH) 0.9 %
10 SYRINGE (ML) INJECTION
Status: CANCELLED | OUTPATIENT
Start: 2020-11-10

## 2020-11-09 RX ORDER — HEPARIN 100 UNIT/ML
500 SYRINGE INTRAVENOUS
Status: CANCELLED | OUTPATIENT
Start: 2020-11-13

## 2020-11-09 RX ORDER — HEPARIN 100 UNIT/ML
500 SYRINGE INTRAVENOUS
Status: CANCELLED | OUTPATIENT
Start: 2020-11-12

## 2020-11-09 RX ORDER — SODIUM CHLORIDE 0.9 % (FLUSH) 0.9 %
10 SYRINGE (ML) INJECTION
Status: CANCELLED | OUTPATIENT
Start: 2020-11-12

## 2020-11-09 RX ORDER — FAMOTIDINE 20 MG/1
20 TABLET, FILM COATED ORAL
COMMUNITY
End: 2023-03-14

## 2020-11-09 RX ORDER — ACYCLOVIR 200 MG/1
CAPSULE ORAL
COMMUNITY
Start: 2020-11-04 | End: 2021-06-16

## 2020-11-09 RX ORDER — ONDANSETRON HYDROCHLORIDE 8 MG/1
8 TABLET, FILM COATED ORAL EVERY 8 HOURS PRN
Qty: 60 TABLET | Refills: 2 | Status: ON HOLD | OUTPATIENT
Start: 2020-11-09 | End: 2021-02-10

## 2020-11-09 RX ORDER — CIPROFLOXACIN 500 MG/1
TABLET ORAL
COMMUNITY
Start: 2020-08-12 | End: 2021-01-19

## 2020-11-09 RX ORDER — HEPARIN 100 UNIT/ML
500 SYRINGE INTRAVENOUS
Status: CANCELLED | OUTPATIENT
Start: 2020-11-11

## 2020-11-09 RX ADMIN — DECITABINE 40 MG: 50 INJECTION, POWDER, LYOPHILIZED, FOR SOLUTION INTRAVENOUS at 11:11

## 2020-11-09 RX ADMIN — Medication 10 ML: at 12:11

## 2020-11-09 NOTE — PROGRESS NOTES
Hematology and Medical Oncology   Follow Up Note     11/09/2020    Primary Oncologic Diagnosis: AML, FLT 3 + and NPMN1+.   History of Present Ilness:   Sabrina Badillo) is a pleasant 70 y.o.female presents to clinic following 2 induction therapies for AML. She was in the hospital roughly 50 days to receive 7+3 and then FLAG JENNIFER.    Oncology History:   68 y.o. female admitted overnight for possible new AML. Came in from OSH with elevated WBC of 100.   05/25/2018: Pt is now on hydrea 2 grams BID, allopurinol 300 mg daily, and IVF. Pt may need rasburicase this weekend. She will undergo a BM bx and aspiration today. She is an induction candidate and will not be screened for research trials. She has anxiety for which she usually takes xanax, this was re-started.   05/26/2018 PICC placed. Reports she slept well last night. Anxiety improved with prn xanax. EF 65%, HIV and Hep panel negative. 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  6/12/2018: Day 15 of 7+3 induction, restaging BM bx done yesterday. On Midostaurin. Fever yesterday and BC with gram + cocci in clusters. On cefepime day 2 and will start Vanc today. Labial mucositis improving.   6/13/2018: Day 16 7+3. BC with staph aureus, identification pending. Surveillance blood cultures done today. Afebrile x 48 hours. On cefepime and Vanc. Labial mucositis resolved. No complaints of pain. Nausea controlled. No diarrhea. Primary complaint is fatigue.   6/14/20018: Day 17 of 7+3. Day 14 bone marrow results pending. BC with staph epidermis only sensitive to Vancomycin. Vanc day 3 and cefepime day 4. Vanc trough 12.2 last night. BP remains low but stable. Afebrile x 72 hours.   6/15/2018: Day 18 of 7+3. Day 14 bone marrow biopsy shows persistent disease with 15% blasts. Discussion with patient regarding treatment and she is deciding if she wants to proceed with re-induction vs outpatient maintenance. Temp of 100.4 overnight, unsustained. Day 5 Cefepime and Day 4 of  Vanc for staph epidermis. Repeat cultures NGTD. BP improved. Electrolytes (mag, phos, K and calcium) replaced aggressively this am and will repeat labs this afternoon. No complaints this am.   06/16/2018: Day 19 of 7+3. Day 2 of Flag-JENNIFER. Remains afebrile. Calcium remains low and have increased PO supplementation. Remains on vanc and aztreonam. Somewhat loopy feeling after receiving benadryl.   06/17/2018: Day 20 of 7+3. Day 3 of FLAG-JENNIFER. Multiple electrolyte abnormalities. New bilateral leg and feet swelling.   6/18/2018: Day 21 of 7+3 and Day 4 of FLAG JENNIFER. Tolerating well with some nausea controlled with Zofran. VSS, afebrile. ANC 1900 on Neupogen. Continues Vanc for staph epi bacteremia. Multiple electrolytes replaced today. Complains of edema to lower extremities, not improved after 20 of lasix yesterday. No sob or CP.   6/19/18: Day 22 of 7+3 and Day 5 of FLAG JENNIFER. VSS, afebrile, ANC 3900. Vanc trough elevated and dose adjusted this am. Lower extremity edema improved after 40 of lasix yesterday, net negative 300 cc I&O. Mild nausea, no abdominal pain or diarrhea. Poor appetite but no difficulty eating or taking pills.   6/20/18: Day 23 of 7+3 and Day 6 of FLAG JENNIFER. Patient complains of nausea and vomiting overnight and this am, especially when taking pills. Will add Zyprexa daily in addition to Zofran, compazine, and ativan PRN and will change meds to IV. 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 Afebrile.  No diarrhea, mouth sores or pain.  06/21/2018: Day 24 of 7+3 and Day 7 of FLAG JENNIFER. Nausea better controlled. Continued on Vanc.  6/22/2018: Day 25 of 7+3 and Day 8 of FLAG JENNIFER. Nausea improved some with Zyprexa but did have 1 episode of emesis overnight.continuing meds IV due to vomiting. Remains afebrile. ANC 0. Continues Vanc and Flagyl. Electrolytes replaced.   06/23/2018 : day 26 of 7+3 and Day 9 of FLAG JENNIFER.  Nausea persists,  worsened after taking pills so meds converted to IV.  Encouraged ambulation.  Continue with flagyl for leptotrichia goodfellowii.  RUQ US showed intrahepatic dilation, overall impression hepatic steatosis.  CBD 0.5cm.  No Gallbladder.    06/24/2018 : day 27 of 7+3 and Day 10 of FLAG JENNIFER.  Nausea persisting, able to tolerate some po pills.  Last day of flagyl (day 5).  Still pancytopenic. Appetite still low as po intake triggers nausea.  06/25/2018: day 28 of 7+3 and Day 11 of FLAG JENNIFER. Afebrile, ANC 0. Has completed Flagyl. Will decrease Vanc to 1000 mg q12, trough 19.9, will complete Vanc on 6/27. Liver enzymes stable on Midostaurin. VSS.   6/26/2018: day 29 of 7+3 and day 12 of FLAG JENNIFER. Liver enzymes improved and Midostaurin increased to full dose 50 mg bid. Nausea controlled, afebrile, VSS, NEON.  6/27/2018: day 30 of 7+3 and Day 13 of FLAG JENNIFER. Persistent nausea and emesis x 1 yesterday. Compazine changed to scheduled. Vanc ends today. Afebrile, VSS. Aggressive electrolyte replacement, low electrolytes possibly due to Midostaurin.   6/28/2018: day 31 of 7+3 and Day 14 of FLAG JENNIFER. Nausea improved with scheduled Compazine. Patient has agreed to start converting some IV meds to po. VSS, afebrile, electrolytes wnl today. Only complains of fatigue, new rash noted to upper back and chest and legs, papular rash mildly red.  6/29/2018: Day 32 of 7+3 and Day 15 of FLAG JENNIFER. Day 14 restaging bone marrow biopsy done at bedside today. Patient states nausea improved but she did have emesis last night after taking 9 pm pills. Rash improved. No mouth sores, diarrhea or pain.   7/2/2018: Day 35 of 7+3 and Day 18 of FLAG JENNIFER. Restaging BM bx results pending. Fluid overload over the weekend. Chest x-ray with pulmonary edema. Os sats down to 88%. Placed on O2 at 2 L NC, off O2 this am. Received lasix. Net negative 2.7 L today. 1 episode of nausea, no emesis. Reports anxiety relieved with PRN meds. Electrolytes improved,  afebrile, VSS.   7/3/2018: Day 36 of 7+3 and Day 19 of FLAG JENNIFER. Restaging Bm bx with GABRIEL. Cardiology consulted for abnormal echo, EF 30% with pulmonary HTN and severe L atrial enlargement. EKG with T wave abnormality, possible anterolateral ischemia and prolonged QTc of 530. Medications adjusted. Nausea controlled, no diarrhea. Electrolytes improved. On O2 as needed.   07/04/2018 : Day 37 of 7+3 and Day 20 of FLAG JENNIFER Diarrhea in AM, watery, no associated abdominal pain, nausea, or vomiting.    07/05/2018: Day 38 of 7+3 ane Day 21 of FLAG JENNIFER. No CP or SOB, cardiology following. On RA. All meds changed to po, denies nausea or vomiting and no diarrhea. VSS, afebrile.   7/6/2018: Day 22 of FLAG JENNIFER. Continues to tolerate po meds with no nausea. Afebrile. Awaiting count recovery for discharge.  7/7/2018-/8/2018: Day 40/41 of 7+3, Day 23/24 of FLAG JENNIFER.  Improving clinically.  Started on mag oxide per patient request.  Labs showing signs of ANC recovery.  7/9/2018: Day 25 FLAG JENNIFER, , continues Neupogen. Received platelets today. Afebrile, VSS. Tolerating all oral meds with no nausea or vomiting. Only complains of fatigue.   7/10/2018: Day 26 FLAG JENNIFER,  today. Had 2 episodes of vomiting and diarrhea last night. Feeling better. Afebrile, VSS.   7/11/2018: Day 27 FLAG JENNIFER,  today. No vomiting or diarrhea overnight and no nausea. Afebrile, VSS. Complains of back pain (bone pain) suspect due to count recovery. Relieved with oxy IR and Zyrtec started.  07/12/2018: Day 28 flag jennifer, engrafted today with ANC of 730. Back pain improved with zyrtec. Scheduled for IT chemo tomorrow at 1:30 pm and discharge home thereafter. Will likely need plt transfusion prior to IT chemo tomorrow   7/13/2018: Day 29 of FLAG JENNIFER. ANC up to 1300 today. Transfusing platelets today prior to LP for IT chemo. Patient continues to complain of bone pain, mostly to back and legs. No nausea, diarrhea, sob. Afebrile and VSS.    --hospitalized 7/23-7/25 for C.diff diarrhea, neutropenic fever.  While inpatient she developed pink blisters on her palm that were concerning for relapse.  --palm biopsy on 7/26/18 for suspicion of leukemia cutis was also negative for sign of relapse  --bone marrow biopsy 7/31/18 was negative for signs of relapse   --Admitted on 8/14/18 for HiDAC#1  --Admitted on 9/11/18 for HiDAC#2  --Admitted on 10/16/18 for HiDAC#3  --Post consolidation bone marrow biopsy on 12/27/2018: AML FISH (BM) Interpretation: The result is within normal limits for the AML FISH panel. No evidence of residual disease.  --Bone Marrow Biopsy on 5/9/19 NO MORPHOLOGIC EVIDENCE OF RESIDUAL ACUTE MYELOID LEUKEMIA. RECOMMEND NGS STUDY TO RULE OUT RESIDUAL DISEASE. deletion 20q  --Bone Marrow Biopsy on 9/23/2020: CELLULARITY=40-50%, TRILINEAGE HEMATOPOIETIC ACTIVITY (M/E= 0.6:1). ERYTHROID HYPERPLASIA, DYSERYTHROPOIESIS, AND INCREASED BLAST (11%). SEE COMMENT.  FOCAL GRADE 1 RETICULAR FIBROSIS.  --Decitabine + Venetoclax C1D1: 10/12/2020, C2D1: 11/9/2020    Interval History:   Mrs. Mac' and her  are here for symptom check prior to the next cycle of therapy. She has had a rough month, including 1 hospitalization for failure to thrive. She has felt weak and extremely short of breath.  Denies fevers/chills, night sweats, lymphadenopathy, bleeding/bruising.      Past Medical History:   Past Medical History:   Diagnosis Date    Cancer 03/2018    AML    CHF (congestive heart failure)     Diarrhea 9/14/2018    Encounter for blood transfusion        Current Medications:   Current Outpatient Medications   Medication Sig    acyclovir (ZOVIRAX) 200 MG capsule     ciprofloxacin HCl (CIPRO) 500 MG tablet     clotrimazole (MYCELEX) 10 mg sultana     diphenhydrAMINE (BENADRYL) 50 MG capsule Take 50 mg by mouth nightly as needed for Itching or Insomnia.    escitalopram oxalate (LEXAPRO) 20 MG tablet     famotidine (PEPCID) 20 MG tablet Take 20  mg by mouth.    fluconazole (DIFLUCAN) 200 MG Tab     LEVAQUIN 500 mg tablet Take 1 tablet (500 mg total) by mouth once daily.    LORazepam (ATIVAN) 1 MG tablet Take 1 tablet (1 mg total) by mouth 2 (two) times daily.    metoprolol succinate (TOPROL-XL) 25 MG 24 hr tablet TAKE ONE TABLET BY MOUTH ONCE DAILY    ofloxacin (FLOXIN) 0.3 % otic solution Place 5 drops into the left ear once daily.    omeprazole (PRILOSEC) 40 MG capsule Take 1 capsule (40 mg total) by mouth before breakfast. Take on empty stomach 30-60 minutes before meal    ondansetron (ZOFRAN) 8 MG tablet     predniSONE (DELTASONE) 10 MG tablet TAKE ONE TABLET BY MOUTH ONCE DAILY AS NEEDED FOR ARTHRITIS FLARE    predniSONE (DELTASONE) 20 MG tablet Take 1 tablet (20 mg total) by mouth once daily.    traMADol (ULTRAM) 50 mg tablet Take 1 tablet (50 mg total) by mouth every 6 (six) hours as needed for Pain.    venetoclax (VENCLEXTA) 100 mg Tab Take 2 tablets (200 mg total) by mouth once daily.    walker (ULTRA-LIGHT ROLLATOR) Misc 1 Units by Misc.(Non-Drug; Combo Route) route once daily.     No current facility-administered medications for this visit.      ALLERGIES:   Review of patient's allergies indicates:   Allergen Reactions    Methotrexate analogues      Elevated Liver Enzyme    Bactrim [sulfamethoxazole-trimethoprim] Nausea And Vomiting and Rash       Review of Systems:     Review of Systems   Constitutional: Positive for appetite change and fatigue. Negative for chills, diaphoresis, fever and unexpected weight change.   HENT:   Negative for hearing loss, mouth sores, nosebleeds, sore throat, trouble swallowing and voice change.    Eyes: Negative for eye problems and icterus.   Respiratory: Negative for chest tightness, cough, hemoptysis, shortness of breath and wheezing.    Cardiovascular: Negative for chest pain, leg swelling and palpitations.   Gastrointestinal: Negative for abdominal distention, abdominal pain, blood in stool,  diarrhea, nausea and vomiting.   Endocrine: Negative for hot flashes.   Genitourinary: Negative for bladder incontinence, difficulty urinating, dysuria and hematuria.    Musculoskeletal: Positive for arthralgias and myalgias. Negative for back pain, flank pain, gait problem, neck pain and neck stiffness.   Skin: Negative for itching, rash and wound.   Neurological: Negative for dizziness, extremity weakness, gait problem, headaches, numbness, seizures and speech difficulty.   Hematological: Negative for adenopathy. Does not bruise/bleed easily.   Psychiatric/Behavioral: Negative for confusion, depression and sleep disturbance. The patient is nervous/anxious.         Physical Exam:     Vitals:    11/09/20 0947   BP: (!) 105/52   Pulse: 84   Resp: 18   Temp: 98.9 °F (37.2 °C)       Physical Exam  Constitutional:       General: She is not in acute distress.     Appearance: She is well-developed. She is not diaphoretic.   HENT:      Head: Normocephalic and atraumatic.      Mouth/Throat:      Pharynx: No oropharyngeal exudate.   Eyes:      Conjunctiva/sclera: Conjunctivae normal.      Pupils: Pupils are equal, round, and reactive to light.   Neck:      Musculoskeletal: Normal range of motion and neck supple.      Thyroid: No thyromegaly.      Vascular: No JVD.      Trachea: No tracheal deviation.   Cardiovascular:      Rate and Rhythm: Normal rate and regular rhythm.      Heart sounds: Normal heart sounds. No murmur. No friction rub.   Pulmonary:      Effort: Pulmonary effort is normal. No respiratory distress.      Breath sounds: Normal breath sounds. No stridor. No wheezing or rales.   Chest:      Chest wall: No tenderness.   Abdominal:      General: Bowel sounds are normal. There is no distension.      Palpations: Abdomen is soft.      Tenderness: There is no abdominal tenderness. There is no guarding or rebound.   Musculoskeletal: Normal range of motion.         General: No tenderness or deformity.   Skin:      General: Skin is warm and dry.      Capillary Refill: Capillary refill takes less than 2 seconds.      Coloration: Skin is not pale.      Findings: No erythema or rash.   Neurological:      Mental Status: She is alert and oriented to person, place, and time.      Cranial Nerves: No cranial nerve deficit.      Sensory: No sensory deficit.      Motor: No abnormal muscle tone.      Coordination: Coordination normal.      Deep Tendon Reflexes: Reflexes normal.   Psychiatric:         Behavior: Behavior normal.         Thought Content: Thought content normal.         Judgment: Judgment normal.       ECOG Performance Status: (foot note - ECOG PS provided by Eastern Cooperative Oncology Group) 1 - Symptomatic but completely ambulatory    Karnofsky Performance Score:  80%- Normal Activity with Effort: Some Symptoms of Disease    Labs:   Lab Results   Component Value Date    WBC 1.53 (LL) 11/09/2020    HGB 7.6 (L) 11/09/2020    HCT 24.0 (L) 11/09/2020    PLT 29 (LL) 11/09/2020    ALT 10 10/30/2020    AST 22 10/30/2020     10/30/2020    K 3.8 10/30/2020     10/30/2020    CREATININE 1.00 10/30/2020    BUN 14 10/30/2020    CO2 27 10/30/2020    TSH 1.913 10/17/2019    INR 1.1 10/16/2019       Imaging: previous imaging has been reviewed    Assessment and Plan:     Mrs. Mac is a pleasant 70 year old female with recently diagnosed AML.      Acute Monocytic Leukemia  -- Admitted from [facility] on 5/25/18 early AM with WBC around 100s, for suspected new non-M3 AML  --lo res HLA typing on 5/26/18 and hi res on 5/27/18 done in anticipation of possible need for future transplant   --Pt with two daughters, two full sisters (in their mid 60s, one with brain aneurysm hx, other one without any medical issues), and one full brother (61 y/o healthy).  --NPM1 +, CEBPA (-), FLT3 (+).  On Midostaurin 50 mg PO BID until Day 14 (held starting 6/8 to 6/11). Stopped when FLAG JENNIFER re-induction started. Restarted Midostaurin at 25  mg bid on day 8 of FLAG JENNIFER induction (6/22), increase to full dose 50 mg bid (6/26) as improvement in liver enzymes. Stopped 7/3/18 due to abnormal cardiac echo.  --day 14 bone marrow biopsy completed 6/11 showing persistent disease with 15% CD 34 positive blasts  --Day 60 of FLAG-JENNIFER, tolerating without difficulty except nausea/vomiting  --day 14 restaging bone marrow biopsy done 6/29 without complication, results with no evidence of AML, FLT 3 negative, will need repeat marrow at count recovery outpatient   --recovery marrow showed no evidence of disease  --HiDAC #1 on 8/14/18, HiDAC#2 on 9/11/18  --Cycle 3 delayed 1 week due to pancytopenia. Started on 10/16/18  --recovery bone marrow showed no signs of residual leukemia  --Will restart Neutropenia prophylaxis  --Bone marrow biopsy done last week reveals early relapse. 11% blasts. We discussed the possibility of re-induction, but she is adamantly against. She does not want to be admitted to the hospital.  --Currently on Decitabine + Venetoclax (has not received the venetoclax yet due to pending financial assistance from the )  --We will do twice weekly labs at [place]       Left sided jaw dysfunction  --MRI revealed degenerative changes  --Seen by ENT    Thrombocytopenia  --Previously in the 70-90 range  --29 today    Suspected Leukemia Cutis  --pathology from dermatology confirmed inflammatory changes  --now resolved with topical steroid cream    Abnormal Liver Function Tests  --ALT and AST and Alk phos have again increased substantially since re-starting midostaurin.   --midostaurin started day 8 at 25 mg bid, monitoring liver enzymes closely and improved. Increasing Midostaurin to 50 mg bid 6/26. Stopped Midostaurin (7/3) due to new diagnosis of systolic heart failure EF 35%.  --6/22/18 liver US showing mild intrahepatic dilation, otherwise impression of hepatic steatosis.  Will hold off of MRCP at this time.    --permanently discontinued  midostaurin     Chemotherapy induced cardiomyopathy  --cardiology follow-up as outpatient in 3 months  --repeat 2D echo on 6/15 with preserved EF of 60%.  However 7/2/18 echo with reduced EF 30-35%  --Echo on 8/15/18 was within normal range EF of 60-65%    Rheumatologic issues  --Previously on steroids and methotrexate  --[place] [facility] [place]    30 minutes were spent face to face with the patient and her family to discuss the disease, natural history, treatment options and survival statistics. I have provided the patient with an opportunity to ask questions and have all questions answered to her satisfaction.     she will return to the clinic in 4 weeks, but knows to call in the interim if symptoms change or should a problem arise.      Laquita Troy MD  Hematology and Medical Oncology  Bone Marrow Transplant  [facility]

## 2020-11-09 NOTE — Clinical Note
1. Labs in [place] on 11/12: cbc,cmp, type and screen  2. Monday and Thursday labs starting 11/16 in [place] through 12/3: cbc,cmp, type and screen  3. See me on 12/7 with cbc,cmp and Decitabine   4. Day 2-5 decitabine in [place] on 12/8-12/11

## 2020-11-10 ENCOUNTER — INFUSION (OUTPATIENT)
Dept: INFUSION THERAPY | Facility: HOSPITAL | Age: 70
End: 2020-11-10
Attending: INTERNAL MEDICINE
Payer: MEDICARE

## 2020-11-10 VITALS
WEIGHT: 180.56 LBS | HEIGHT: 62 IN | TEMPERATURE: 97 F | SYSTOLIC BLOOD PRESSURE: 109 MMHG | BODY MASS INDEX: 33.23 KG/M2 | RESPIRATION RATE: 18 BRPM | HEART RATE: 77 BPM | DIASTOLIC BLOOD PRESSURE: 53 MMHG

## 2020-11-10 DIAGNOSIS — C93.01 ACUTE MONOCYTIC LEUKEMIA IN REMISSION: ICD-10-CM

## 2020-11-10 DIAGNOSIS — C92.02 ACUTE MYELOID LEUKEMIA IN RELAPSE: Primary | ICD-10-CM

## 2020-11-10 PROCEDURE — 96413 CHEMO IV INFUSION 1 HR: CPT | Mod: PN

## 2020-11-10 PROCEDURE — 25000003 PHARM REV CODE 250: Mod: PN | Performed by: INTERNAL MEDICINE

## 2020-11-10 PROCEDURE — 63600175 PHARM REV CODE 636 W HCPCS: Mod: JG,PN | Performed by: INTERNAL MEDICINE

## 2020-11-10 RX ORDER — SODIUM CHLORIDE 0.9 % (FLUSH) 0.9 %
10 SYRINGE (ML) INJECTION
Status: DISCONTINUED | OUTPATIENT
Start: 2020-11-10 | End: 2020-11-10 | Stop reason: HOSPADM

## 2020-11-10 RX ADMIN — DECITABINE 40 MG: 50 INJECTION, POWDER, LYOPHILIZED, FOR SOLUTION INTRAVENOUS at 12:11

## 2020-11-10 NOTE — PLAN OF CARE
Problem: Adult Inpatient Plan of Care  Goal: Plan of Care Review  Outcome: Ongoing, Progressing  Flowsheets (Taken 11/10/2020 1707)  Plan of Care Reviewed With: patient     Pt tolerated tx well this shift. VSS. Calendar given and reviewed with pt. Pt is safe for discharge.

## 2020-11-11 ENCOUNTER — TELEPHONE (OUTPATIENT)
Dept: HEMATOLOGY/ONCOLOGY | Facility: CLINIC | Age: 70
End: 2020-11-11

## 2020-11-11 ENCOUNTER — INFUSION (OUTPATIENT)
Dept: INFUSION THERAPY | Facility: HOSPITAL | Age: 70
End: 2020-11-11
Attending: INTERNAL MEDICINE
Payer: MEDICARE

## 2020-11-11 VITALS
HEIGHT: 62 IN | RESPIRATION RATE: 17 BRPM | HEART RATE: 63 BPM | DIASTOLIC BLOOD PRESSURE: 53 MMHG | SYSTOLIC BLOOD PRESSURE: 105 MMHG | TEMPERATURE: 98 F | BODY MASS INDEX: 33.23 KG/M2 | WEIGHT: 180.56 LBS

## 2020-11-11 DIAGNOSIS — C93.01 ACUTE MONOCYTIC LEUKEMIA IN REMISSION: ICD-10-CM

## 2020-11-11 DIAGNOSIS — C92.02 ACUTE MYELOID LEUKEMIA IN RELAPSE: Primary | ICD-10-CM

## 2020-11-11 PROCEDURE — 25000003 PHARM REV CODE 250: Mod: PN | Performed by: INTERNAL MEDICINE

## 2020-11-11 PROCEDURE — A4216 STERILE WATER/SALINE, 10 ML: HCPCS | Mod: PN | Performed by: INTERNAL MEDICINE

## 2020-11-11 PROCEDURE — 63600175 PHARM REV CODE 636 W HCPCS: Mod: JG,PN | Performed by: INTERNAL MEDICINE

## 2020-11-11 PROCEDURE — 96413 CHEMO IV INFUSION 1 HR: CPT | Mod: PN

## 2020-11-11 RX ORDER — SODIUM CHLORIDE 0.9 % (FLUSH) 0.9 %
10 SYRINGE (ML) INJECTION
Status: DISCONTINUED | OUTPATIENT
Start: 2020-11-11 | End: 2020-11-11 | Stop reason: HOSPADM

## 2020-11-11 RX ADMIN — DECITABINE 40 MG: 50 INJECTION, POWDER, LYOPHILIZED, FOR SOLUTION INTRAVENOUS at 12:11

## 2020-11-11 RX ADMIN — Medication 10 ML: at 12:11

## 2020-11-11 NOTE — TELEPHONE ENCOUNTER
FOR DOCUMENTATION ONLY:  Financial Assistance for Venclexta is approved 11- for up to three years  Source [place] Patient Assistance    [phone]  Sending a staff message to Dr Troy regarding approval

## 2020-11-11 NOTE — PLAN OF CARE
Problem: Adult Inpatient Plan of Care  Goal: Plan of Care Review  Outcome: Ongoing, Progressing  Flowsheets (Taken 11/11/2020 8750)  Plan of Care Reviewed With: patient     Pt tolerated tx well this shift. VSS. PIV removed this shift. Pt is safe for discharge.

## 2020-11-11 NOTE — TELEPHONE ENCOUNTER
----- Message from Reyna Mena sent at 11/11/2020 10:58 AM CST -----  Regarding: Venclexta Patient Assistance  Patient was approved to receive her Venclexta through the [employer] patient assistance program and will be contacted within a day to two to schedule her shipment.   Thank you  Joleen  [phone]

## 2020-11-12 ENCOUNTER — INFUSION (OUTPATIENT)
Dept: INFUSION THERAPY | Facility: HOSPITAL | Age: 70
End: 2020-11-12
Attending: INTERNAL MEDICINE
Payer: MEDICARE

## 2020-11-12 ENCOUNTER — TELEPHONE (OUTPATIENT)
Dept: HEMATOLOGY/ONCOLOGY | Facility: CLINIC | Age: 70
End: 2020-11-12

## 2020-11-12 VITALS
HEART RATE: 67 BPM | DIASTOLIC BLOOD PRESSURE: 57 MMHG | WEIGHT: 180.56 LBS | TEMPERATURE: 98 F | SYSTOLIC BLOOD PRESSURE: 113 MMHG | BODY MASS INDEX: 33.23 KG/M2 | RESPIRATION RATE: 17 BRPM | HEIGHT: 62 IN

## 2020-11-12 DIAGNOSIS — C92.02 ACUTE MYELOID LEUKEMIA IN RELAPSE: Primary | ICD-10-CM

## 2020-11-12 DIAGNOSIS — C93.01 ACUTE MONOCYTIC LEUKEMIA IN REMISSION: ICD-10-CM

## 2020-11-12 PROCEDURE — 63600175 PHARM REV CODE 636 W HCPCS: Mod: JG,PN | Performed by: INTERNAL MEDICINE

## 2020-11-12 PROCEDURE — 96413 CHEMO IV INFUSION 1 HR: CPT | Mod: PN

## 2020-11-12 PROCEDURE — 25000003 PHARM REV CODE 250: Mod: PN | Performed by: INTERNAL MEDICINE

## 2020-11-12 RX ORDER — SODIUM CHLORIDE 0.9 % (FLUSH) 0.9 %
10 SYRINGE (ML) INJECTION
Status: DISCONTINUED | OUTPATIENT
Start: 2020-11-12 | End: 2020-11-12 | Stop reason: HOSPADM

## 2020-11-12 RX ADMIN — DECITABINE 40 MG: 50 INJECTION, POWDER, LYOPHILIZED, FOR SOLUTION INTRAVENOUS at 11:11

## 2020-11-12 NOTE — PLAN OF CARE
Problem: Adult Inpatient Plan of Care  Goal: Plan of Care Review  Outcome: Ongoing, Progressing  Flowsheets (Taken 11/12/2020 1414)  Plan of Care Reviewed With: patient     Pt tolerated tx well this shift. VSS. PIV flushed/blood return noted and left in place for use tomorrow. Pt is safe for discharge.

## 2020-11-13 ENCOUNTER — INFUSION (OUTPATIENT)
Dept: INFUSION THERAPY | Facility: HOSPITAL | Age: 70
End: 2020-11-13
Attending: INTERNAL MEDICINE
Payer: MEDICARE

## 2020-11-13 VITALS
HEIGHT: 62 IN | HEART RATE: 65 BPM | WEIGHT: 180.56 LBS | DIASTOLIC BLOOD PRESSURE: 56 MMHG | TEMPERATURE: 97 F | RESPIRATION RATE: 18 BRPM | SYSTOLIC BLOOD PRESSURE: 117 MMHG | OXYGEN SATURATION: 98 % | BODY MASS INDEX: 33.23 KG/M2

## 2020-11-13 DIAGNOSIS — C93.01 ACUTE MONOCYTIC LEUKEMIA IN REMISSION: ICD-10-CM

## 2020-11-13 DIAGNOSIS — C92.02 ACUTE MYELOID LEUKEMIA IN RELAPSE: Primary | ICD-10-CM

## 2020-11-13 PROCEDURE — 96413 CHEMO IV INFUSION 1 HR: CPT | Mod: PN

## 2020-11-13 PROCEDURE — 63600175 PHARM REV CODE 636 W HCPCS: Mod: JG,PN | Performed by: INTERNAL MEDICINE

## 2020-11-13 PROCEDURE — 25000003 PHARM REV CODE 250: Mod: PN | Performed by: INTERNAL MEDICINE

## 2020-11-13 RX ADMIN — DECITABINE 40 MG: 50 INJECTION, POWDER, LYOPHILIZED, FOR SOLUTION INTRAVENOUS at 12:11

## 2020-11-13 NOTE — PLAN OF CARE
Problem: Fatigue  Goal: Improved Activity Tolerance  Outcome: Ongoing, Progressing   Pt tolerated well. D/C'd in stable condition.

## 2020-11-16 ENCOUNTER — TELEPHONE (OUTPATIENT)
Dept: HEMATOLOGY/ONCOLOGY | Facility: CLINIC | Age: 70
End: 2020-11-16

## 2020-11-16 DIAGNOSIS — C92.02 ACUTE MYELOID LEUKEMIA IN RELAPSE: Primary | ICD-10-CM

## 2020-11-16 DIAGNOSIS — D64.9 SYMPTOMATIC ANEMIA: ICD-10-CM

## 2020-11-16 RX ORDER — ACETAMINOPHEN 325 MG/1
650 TABLET ORAL
Status: CANCELLED | OUTPATIENT
Start: 2020-11-16

## 2020-11-16 RX ORDER — HYDROCODONE BITARTRATE AND ACETAMINOPHEN 500; 5 MG/1; MG/1
TABLET ORAL ONCE
Status: CANCELLED | OUTPATIENT
Start: 2020-11-16 | End: 2020-11-16

## 2020-11-16 RX ORDER — DIPHENHYDRAMINE HCL 25 MG
25 CAPSULE ORAL
Status: CANCELLED | OUTPATIENT
Start: 2020-11-16

## 2020-11-19 ENCOUNTER — PATIENT MESSAGE (OUTPATIENT)
Dept: HEMATOLOGY/ONCOLOGY | Facility: CLINIC | Age: 70
End: 2020-11-19

## 2020-11-23 DIAGNOSIS — C92.02 ACUTE MYELOID LEUKEMIA IN RELAPSE: Primary | ICD-10-CM

## 2020-11-23 DIAGNOSIS — D64.9 SYMPTOMATIC ANEMIA: Primary | ICD-10-CM

## 2020-11-23 DIAGNOSIS — R52 PAIN: ICD-10-CM

## 2020-11-23 RX ORDER — ACETAMINOPHEN 325 MG/1
650 TABLET ORAL
Status: CANCELLED | OUTPATIENT
Start: 2020-11-23

## 2020-11-23 RX ORDER — OXYCODONE HYDROCHLORIDE 10 MG/1
10 TABLET ORAL EVERY 6 HOURS PRN
Qty: 30 TABLET | Refills: 0 | Status: SHIPPED | OUTPATIENT
Start: 2020-11-23 | End: 2020-12-21 | Stop reason: SDUPTHER

## 2020-11-23 RX ORDER — HYDROCODONE BITARTRATE AND ACETAMINOPHEN 500; 5 MG/1; MG/1
TABLET ORAL ONCE
Status: CANCELLED | OUTPATIENT
Start: 2020-11-23 | End: 2020-11-23

## 2020-11-23 RX ORDER — DIPHENHYDRAMINE HCL 25 MG
25 CAPSULE ORAL
Status: CANCELLED | OUTPATIENT
Start: 2020-11-23

## 2020-11-30 ENCOUNTER — TELEPHONE (OUTPATIENT)
Dept: HEMATOLOGY/ONCOLOGY | Facility: CLINIC | Age: 70
End: 2020-11-30

## 2020-11-30 DIAGNOSIS — D61.810 PANCYTOPENIA DUE TO CHEMOTHERAPY: ICD-10-CM

## 2020-11-30 DIAGNOSIS — D64.9 SYMPTOMATIC ANEMIA: Primary | ICD-10-CM

## 2020-11-30 RX ORDER — HYDROCODONE BITARTRATE AND ACETAMINOPHEN 500; 5 MG/1; MG/1
TABLET ORAL ONCE
Status: CANCELLED | OUTPATIENT
Start: 2020-11-30 | End: 2020-11-30

## 2020-11-30 RX ORDER — ACETAMINOPHEN 325 MG/1
650 TABLET ORAL
Status: CANCELLED | OUTPATIENT
Start: 2020-11-30

## 2020-11-30 RX ORDER — DIPHENHYDRAMINE HCL 25 MG
25 CAPSULE ORAL
Status: CANCELLED | OUTPATIENT
Start: 2020-11-30

## 2020-11-30 NOTE — TELEPHONE ENCOUNTER
----- Message from Loraine Zuñiga sent at 11/30/2020 12:05 PM CST -----   Critical Value   out Patient  for  platelet      Meghana with [facility]  [phone]

## 2020-12-01 DIAGNOSIS — D61.810 PANCYTOPENIA DUE TO CHEMOTHERAPY: Primary | ICD-10-CM

## 2020-12-01 RX ORDER — HYDROCODONE BITARTRATE AND ACETAMINOPHEN 500; 5 MG/1; MG/1
TABLET ORAL ONCE
Status: CANCELLED | OUTPATIENT
Start: 2020-12-01 | End: 2020-12-01

## 2020-12-03 ENCOUNTER — LAB VISIT (OUTPATIENT)
Dept: LAB | Facility: HOSPITAL | Age: 70
End: 2020-12-03
Attending: INTERNAL MEDICINE
Payer: MEDICARE

## 2020-12-03 ENCOUNTER — PATIENT MESSAGE (OUTPATIENT)
Dept: HEMATOLOGY/ONCOLOGY | Facility: CLINIC | Age: 70
End: 2020-12-03

## 2020-12-03 DIAGNOSIS — C92.00 AML (ACUTE MYELOBLASTIC LEUKEMIA): ICD-10-CM

## 2020-12-03 DIAGNOSIS — C93.01 ACUTE MONOCYTIC LEUKEMIA IN REMISSION: ICD-10-CM

## 2020-12-03 LAB
ABO + RH BLD: NORMAL
ALBUMIN SERPL BCP-MCNC: 4.3 G/DL (ref 3.5–5.2)
ALP SERPL-CCNC: 126 U/L (ref 38–145)
ALT SERPL W/O P-5'-P-CCNC: 26 U/L (ref 0–35)
ANION GAP SERPL CALC-SCNC: 8 MMOL/L (ref 8–16)
AST SERPL-CCNC: 22 U/L (ref 14–36)
BILIRUB SERPL-MCNC: 0.8 MG/DL (ref 0.2–1.3)
BLD GP AB SCN CELLS X3 SERPL QL: NORMAL
CALCIUM SERPL-MCNC: 10 MG/DL (ref 8.4–10.2)
CHLORIDE SERPL-SCNC: 105 MMOL/L (ref 95–110)
CO2 SERPL-SCNC: 27 MMOL/L (ref 22–31)
CREAT SERPL-MCNC: 1.13 MG/DL (ref 0.5–1.4)
ERYTHROCYTE [DISTWIDTH] IN BLOOD BY AUTOMATED COUNT: 14.6 % (ref 11.5–14.5)
EST. GFR  (AFRICAN AMERICAN): 57 ML/MIN/1.73 M^2
EST. GFR  (NON AFRICAN AMERICAN): 50 ML/MIN/1.73 M^2
GLUCOSE SERPL-MCNC: 109 MG/DL (ref 70–110)
HCT VFR BLD AUTO: 29.9 % (ref 37–48.5)
HGB BLD-MCNC: 9.7 G/DL (ref 12–16)
IMM GRANULOCYTES # BLD AUTO: 0 K/UL (ref 0–0.04)
MCH RBC QN AUTO: 28.7 PG (ref 27–31)
MCHC RBC AUTO-ENTMCNC: 32.4 G/DL (ref 32–36)
MCV RBC AUTO: 89 FL (ref 82–98)
NEUTROPHILS # BLD AUTO: 0 K/UL (ref 1.8–7.7)
PLATELET # BLD AUTO: 31 K/UL (ref 150–350)
PMV BLD AUTO: 11 FL (ref 9.2–12.9)
POTASSIUM SERPL-SCNC: 5.1 MMOL/L (ref 3.5–5.1)
PROT SERPL-MCNC: 7.1 G/DL (ref 6–8.4)
RBC # BLD AUTO: 3.38 M/UL (ref 4–5.4)
SODIUM SERPL-SCNC: 140 MMOL/L (ref 136–145)
UUN UR-MCNC: 21 MG/DL (ref 7–18)
WBC # BLD AUTO: 1 K/UL (ref 3.9–12.7)

## 2020-12-03 PROCEDURE — 86900 BLOOD TYPING SEROLOGIC ABO: CPT | Mod: PN

## 2020-12-03 PROCEDURE — 85027 COMPLETE CBC AUTOMATED: CPT | Mod: PN

## 2020-12-03 PROCEDURE — 86900 BLOOD TYPING SEROLOGIC ABO: CPT

## 2020-12-03 PROCEDURE — 36415 COLL VENOUS BLD VENIPUNCTURE: CPT | Mod: PN

## 2020-12-03 PROCEDURE — 80053 COMPREHEN METABOLIC PANEL: CPT

## 2020-12-03 PROCEDURE — 80053 COMPREHEN METABOLIC PANEL: CPT | Mod: PN

## 2020-12-03 PROCEDURE — 85027 COMPLETE CBC AUTOMATED: CPT

## 2020-12-07 ENCOUNTER — DOCUMENTATION ONLY (OUTPATIENT)
Dept: INFUSION THERAPY | Facility: HOSPITAL | Age: 70
End: 2020-12-07

## 2020-12-07 NOTE — PROGRESS NOTES
Received msg from Liliam MONROE, pts spouse being r/o for covid, until results obtained all appts have been pushed back until next week.  Rescheduled our appts to follow SS MD and infusion appts.

## 2020-12-11 ENCOUNTER — TELEPHONE (OUTPATIENT)
Dept: HEMATOLOGY/ONCOLOGY | Facility: CLINIC | Age: 70
End: 2020-12-11

## 2020-12-11 NOTE — TELEPHONE ENCOUNTER
Reached out to patient daughter and assessed how patient is feeling. Patient is doing well and denies all symptoms.

## 2020-12-14 ENCOUNTER — OFFICE VISIT (OUTPATIENT)
Dept: HEMATOLOGY/ONCOLOGY | Facility: CLINIC | Age: 70
End: 2020-12-14
Payer: MEDICARE

## 2020-12-14 DIAGNOSIS — R79.89 ELEVATED LFTS: ICD-10-CM

## 2020-12-14 DIAGNOSIS — R11.2 CINV (CHEMOTHERAPY-INDUCED NAUSEA AND VOMITING): ICD-10-CM

## 2020-12-14 DIAGNOSIS — T45.1X5A CINV (CHEMOTHERAPY-INDUCED NAUSEA AND VOMITING): ICD-10-CM

## 2020-12-14 DIAGNOSIS — C92.02 ACUTE MYELOID LEUKEMIA IN RELAPSE: Primary | ICD-10-CM

## 2020-12-14 DIAGNOSIS — R53.83 LETHARGY: ICD-10-CM

## 2020-12-14 DIAGNOSIS — D64.9 SYMPTOMATIC ANEMIA: ICD-10-CM

## 2020-12-14 DIAGNOSIS — M54.6 ACUTE BILATERAL THORACIC BACK PAIN: ICD-10-CM

## 2020-12-14 PROCEDURE — 99215 OFFICE O/P EST HI 40 MIN: CPT | Mod: 95,,, | Performed by: INTERNAL MEDICINE

## 2020-12-14 PROCEDURE — 99215 PR OFFICE/OUTPT VISIT, EST, LEVL V, 40-54 MIN: ICD-10-PCS | Mod: 95,,, | Performed by: INTERNAL MEDICINE

## 2020-12-15 ENCOUNTER — LAB VISIT (OUTPATIENT)
Dept: LAB | Facility: HOSPITAL | Age: 70
End: 2020-12-15
Attending: INTERNAL MEDICINE
Payer: MEDICARE

## 2020-12-15 ENCOUNTER — TELEPHONE (OUTPATIENT)
Dept: INFUSION THERAPY | Facility: HOSPITAL | Age: 70
End: 2020-12-15

## 2020-12-15 DIAGNOSIS — C92.00 AML (ACUTE MYELOBLASTIC LEUKEMIA): ICD-10-CM

## 2020-12-15 DIAGNOSIS — C93.01 ACUTE MONOCYTIC LEUKEMIA IN REMISSION: ICD-10-CM

## 2020-12-15 LAB
ABO + RH BLD: NORMAL
ALBUMIN SERPL BCP-MCNC: 3.6 G/DL (ref 3.5–5.2)
ALP SERPL-CCNC: 130 U/L (ref 55–135)
ALT SERPL W/O P-5'-P-CCNC: 10 U/L (ref 10–44)
ANION GAP SERPL CALC-SCNC: 13 MMOL/L (ref 8–16)
AST SERPL-CCNC: 11 U/L (ref 10–40)
BILIRUB SERPL-MCNC: 0.6 MG/DL (ref 0.1–1)
BLD GP AB SCN CELLS X3 SERPL QL: NORMAL
BUN SERPL-MCNC: 22 MG/DL (ref 8–23)
CALCIUM SERPL-MCNC: 9.6 MG/DL (ref 8.7–10.5)
CHLORIDE SERPL-SCNC: 103 MMOL/L (ref 95–110)
CO2 SERPL-SCNC: 22 MMOL/L (ref 23–29)
CREAT SERPL-MCNC: 1.2 MG/DL (ref 0.5–1.4)
ERYTHROCYTE [DISTWIDTH] IN BLOOD BY AUTOMATED COUNT: 13.7 % (ref 11.5–14.5)
EST. GFR  (AFRICAN AMERICAN): 53 ML/MIN/1.73 M^2
EST. GFR  (NON AFRICAN AMERICAN): 46 ML/MIN/1.73 M^2
GLUCOSE SERPL-MCNC: 112 MG/DL (ref 70–110)
HCT VFR BLD AUTO: 23.5 % (ref 37–48.5)
HGB BLD-MCNC: 7.5 G/DL (ref 12–16)
IMM GRANULOCYTES # BLD AUTO: 0 K/UL (ref 0–0.04)
MCH RBC QN AUTO: 28.5 PG (ref 27–31)
MCHC RBC AUTO-ENTMCNC: 31.9 G/DL (ref 32–36)
MCV RBC AUTO: 89 FL (ref 82–98)
NEUTROPHILS # BLD AUTO: 0 K/UL (ref 1.8–7.7)
PLATELET # BLD AUTO: 20 K/UL (ref 150–350)
PMV BLD AUTO: 11.4 FL (ref 9.2–12.9)
POTASSIUM SERPL-SCNC: 4.5 MMOL/L (ref 3.5–5.1)
PROT SERPL-MCNC: 6.9 G/DL (ref 6–8.4)
RBC # BLD AUTO: 2.63 M/UL (ref 4–5.4)
SODIUM SERPL-SCNC: 138 MMOL/L (ref 136–145)
WBC # BLD AUTO: 0.8 K/UL (ref 3.9–12.7)

## 2020-12-15 PROCEDURE — 86850 RBC ANTIBODY SCREEN: CPT

## 2020-12-15 PROCEDURE — 80053 COMPREHEN METABOLIC PANEL: CPT

## 2020-12-15 PROCEDURE — 36415 COLL VENOUS BLD VENIPUNCTURE: CPT

## 2020-12-15 PROCEDURE — 85027 COMPLETE CBC AUTOMATED: CPT

## 2020-12-15 NOTE — TELEPHONE ENCOUNTER
----- Message from iKtty Dominguez sent at 12/14/2020  9:02 AM CST -----  Good morning Tabby,    Can we push all of this pt's chemo back 1 week. She tested positive for COVID and is in quarantine until 12/21.     Thank you!

## 2020-12-16 NOTE — PROGRESS NOTES
Hematology and Medical Oncology   Follow Up Note     12/14/2020    Primary Oncologic Diagnosis: AML, FLT 3 + and NPMN1+    Telemedicine Documentation:  The patient location is: home  The chief complaint leading to consultation is: Acute leukemia treatment planning    Visit type: audiovisual    Face to Face time with patient: 25 [done via [employer] platform]    30 minutes of total time spent on the encounter, which includes face to face time and non-face to face time preparing to see the patient (eg, review of tests), Obtaining and/or reviewing separately obtained history, Documenting clinical information in the electronic or other health record, Independently interpreting results (not separately reported) and communicating results to the patient/family/caregiver, or Care coordination (not separately reported).       Each patient to whom he or she provides medical services by telemedicine is:  (1) informed of the relationship between the physician and patient and the respective role of any other health care provider with respect to management of the patient; and (2) notified that he or she may decline to receive medical services by telemedicine and may withdraw from such care at any time.      History of Present Ilness:   Sabrina Badillo) is a pleasant 70 y.o.female presents to clinic following 2 induction therapies for AML. She was in the hospital roughly 50 days to receive 7+3 and then FLAG JENNIFER.    Oncology History:   68 y.o. female admitted overnight for possible new AML. Came in from OSH with elevated WBC of 100.   05/25/2018: Pt is now on hydrea 2 grams BID, allopurinol 300 mg daily, and IVF. Pt may need rasburicase this weekend. She will undergo a BM bx and aspiration today. She is an induction candidate and will not be screened for research trials. She has anxiety for which she usually takes xanax, this was re-started.   05/26/2018 PICC placed. Reports she slept well last night. Anxiety improved  with prn xanax. EF 65%, HIV and Hep panel negative. 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  6/12/2018: Day 15 of 7+3 induction, restaging BM bx done yesterday. On Midostaurin. Fever yesterday and BC with gram + cocci in clusters. On cefepime day 2 and will start Vanc today. Labial mucositis improving.   6/13/2018: Day 16 7+3. BC with staph aureus, identification pending. Surveillance blood cultures done today. Afebrile x 48 hours. On cefepime and Vanc. Labial mucositis resolved. No complaints of pain. Nausea controlled. No diarrhea. Primary complaint is fatigue.   6/14/20018: Day 17 of 7+3. Day 14 bone marrow results pending. BC with staph epidermis only sensitive to Vancomycin. Vanc day 3 and cefepime day 4. Vanc trough 12.2 last night. BP remains low but stable. Afebrile x 72 hours.   6/15/2018: Day 18 of 7+3. Day 14 bone marrow biopsy shows persistent disease with 15% blasts. Discussion with patient regarding treatment and she is deciding if she wants to proceed with re-induction vs outpatient maintenance. Temp of 100.4 overnight, unsustained. Day 5 Cefepime and Day 4 of Vanc for staph epidermis. Repeat cultures NGTD. BP improved. Electrolytes (mag, phos, K and calcium) replaced aggressively this am and will repeat labs this afternoon. No complaints this am.   06/16/2018: Day 19 of 7+3. Day 2 of Flag-JENNIFER. Remains afebrile. Calcium remains low and have increased PO supplementation. Remains on vanc and aztreonam. Somewhat loopy feeling after receiving benadryl.   06/17/2018: Day 20 of 7+3. Day 3 of FLAG-JENNIFER. Multiple electrolyte abnormalities. New bilateral leg and feet swelling.   6/18/2018: Day 21 of 7+3 and Day 4 of FLAG JENNIFER. Tolerating well with some nausea controlled with Zofran. VSS, afebrile. ANC 1900 on Neupogen. Continues Vanc for staph epi bacteremia. Multiple electrolytes replaced today. Complains of edema to lower extremities, not improved after 20 of lasix yesterday. No sob or CP.   6/19/18: Day 22  of 7+3 and Day 5 of FLAG JENNIFER. VSS, afebrile, ANC 3900. Vanc trough elevated and dose adjusted this am. Lower extremity edema improved after 40 of lasix yesterday, net negative 300 cc I&O. Mild nausea, no abdominal pain or diarrhea. Poor appetite but no difficulty eating or taking pills.   6/20/18: Day 23 of 7+3 and Day 6 of FLAG JENNIFER. Patient complains of nausea and vomiting overnight and this am, especially when taking pills. Will add Zyprexa daily in addition to Zofran, compazine, and ativan PRN and will change meds to IV. 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 Afebrile.  No diarrhea, mouth sores or pain.  06/21/2018: Day 24 of 7+3 and Day 7 of FLAG JENNIFER. Nausea better controlled. Continued on Vanc.  6/22/2018: Day 25 of 7+3 and Day 8 of FLAG JENNIFER. Nausea improved some with Zyprexa but did have 1 episode of emesis overnight.continuing meds IV due to vomiting. Remains afebrile. ANC 0. Continues Vanc and Flagyl. Electrolytes replaced.   06/23/2018 : day 26 of 7+3 and Day 9 of FLAG JENNIFER.  Nausea persists, worsened after taking pills so meds converted to IV.  Encouraged ambulation.  Continue with flagyl for leptotrichia goodfellowii.  RUQ US showed intrahepatic dilation, overall impression hepatic steatosis.  CBD 0.5cm.  No Gallbladder.    06/24/2018 : day 27 of 7+3 and Day 10 of FLAG JENNIFER.  Nausea persisting, able to tolerate some po pills.  Last day of flagyl (day 5).  Still pancytopenic. Appetite still low as po intake triggers nausea.  06/25/2018: day 28 of 7+3 and Day 11 of FLAG JENNIFER. Afebrile, ANC 0. Has completed Flagyl. Will decrease Vanc to 1000 mg q12, trough 19.9, will complete Vanc on 6/27. Liver enzymes stable on Midostaurin. VSS.   6/26/2018: day 29 of 7+3 and day 12 of FLAG JENNIFER. Liver enzymes improved and Midostaurin increased to full dose 50 mg bid. Nausea controlled, afebrile, VSS, NEON.  6/27/2018: day 30 of 7+3 and Day 13 of FLAG  JENNIFER. Persistent nausea and emesis x 1 yesterday. Compazine changed to scheduled. Vanc ends today. Afebrile, VSS. Aggressive electrolyte replacement, low electrolytes possibly due to Midostaurin.   6/28/2018: day 31 of 7+3 and Day 14 of FLAG JENNIFER. Nausea improved with scheduled Compazine. Patient has agreed to start converting some IV meds to po. VSS, afebrile, electrolytes wnl today. Only complains of fatigue, new rash noted to upper back and chest and legs, papular rash mildly red.  6/29/2018: Day 32 of 7+3 and Day 15 of FLAG JENNIFER. Day 14 restaging bone marrow biopsy done at bedside today. Patient states nausea improved but she did have emesis last night after taking 9 pm pills. Rash improved. No mouth sores, diarrhea or pain.   7/2/2018: Day 35 of 7+3 and Day 18 of FLAG JENNIFER. Restaging BM bx results pending. Fluid overload over the weekend. Chest x-ray with pulmonary edema. Os sats down to 88%. Placed on O2 at 2 L NC, off O2 this am. Received lasix. Net negative 2.7 L today. 1 episode of nausea, no emesis. Reports anxiety relieved with PRN meds. Electrolytes improved, afebrile, VSS.   7/3/2018: Day 36 of 7+3 and Day 19 of FLAG JENNIFER. Restaging Bm bx with GABRIEL. Cardiology consulted for abnormal echo, EF 30% with pulmonary HTN and severe L atrial enlargement. EKG with T wave abnormality, possible anterolateral ischemia and prolonged QTc of 530. Medications adjusted. Nausea controlled, no diarrhea. Electrolytes improved. On O2 as needed.   07/04/2018 : Day 37 of 7+3 and Day 20 of FLAG JENNIFER Diarrhea in AM, watery, no associated abdominal pain, nausea, or vomiting.    07/05/2018: Day 38 of 7+3 ane Day 21 of FLAG JENNIFER. No CP or SOB, cardiology following. On RA. All meds changed to po, denies nausea or vomiting and no diarrhea. VSS, afebrile.   7/6/2018: Day 22 of FLAG JENNIFER. Continues to tolerate po meds with no nausea. Afebrile. Awaiting count recovery for discharge.  7/7/2018-/8/2018: Day 40/41 of 7+3, Day 23/24 of FLAG JENNIFER.   Improving clinically.  Started on mag oxide per patient request.  Labs showing signs of ANC recovery.  7/9/2018: Day 25 FLAG JENNIFER, , continues Neupogen. Received platelets today. Afebrile, VSS. Tolerating all oral meds with no nausea or vomiting. Only complains of fatigue.   7/10/2018: Day 26 FLAG JENNIFER,  today. Had 2 episodes of vomiting and diarrhea last night. Feeling better. Afebrile, VSS.   7/11/2018: Day 27 FLAG JENNIFER,  today. No vomiting or diarrhea overnight and no nausea. Afebrile, VSS. Complains of back pain (bone pain) suspect due to count recovery. Relieved with oxy IR and Zyrtec started.  07/12/2018: Day 28 flag jennifer, engrafted today with ANC of 730. Back pain improved with zyrtec. Scheduled for IT chemo tomorrow at 1:30 pm and discharge home thereafter. Will likely need plt transfusion prior to IT chemo tomorrow   7/13/2018: Day 29 of FLAG JENNIFER. ANC up to 1300 today. Transfusing platelets today prior to LP for IT chemo. Patient continues to complain of bone pain, mostly to back and legs. No nausea, diarrhea, sob. Afebrile and VSS.   --hospitalized 7/23-7/25 for C.diff diarrhea, neutropenic fever.  While inpatient she developed pink blisters on her palm that were concerning for relapse.  --palm biopsy on 7/26/18 for suspicion of leukemia cutis was also negative for sign of relapse  --bone marrow biopsy 7/31/18 was negative for signs of relapse   --Admitted on 8/14/18 for HiDAC#1  --Admitted on 9/11/18 for HiDAC#2  --Admitted on 10/16/18 for HiDAC#3  --Post consolidation bone marrow biopsy on 12/27/2018: AML FISH (BM) Interpretation: The result is within normal limits for the AML FISH panel. No evidence of residual disease.  --Bone Marrow Biopsy on 5/9/19 NO MORPHOLOGIC EVIDENCE OF RESIDUAL ACUTE MYELOID LEUKEMIA. RECOMMEND NGS STUDY TO RULE OUT RESIDUAL DISEASE. deletion 20q  --Bone Marrow Biopsy on 9/23/2020: CELLULARITY=40-50%, TRILINEAGE HEMATOPOIETIC ACTIVITY (M/E= 0.6:1). ERYTHROID  HYPERPLASIA, DYSERYTHROPOIESIS, AND INCREASED BLAST (11%). SEE COMMENT.  FOCAL GRADE 1 RETICULAR FIBROSIS.  --Decitabine + Venetoclax: C1D1: 10/12/2020, C2D1: 11/9      Interval History:   Mrs. Wilkes presents virtually to discuss her ongoing care. Cycle 3 of Decitabine has been delayed 2 weeks due to quarantine for COVID,  Jesi tested positive on 12/8.     She has experienced a rough several weeks. She's not been feeling well, fatigues very easily and needs to rest after doing simple tasks like pouring a cup of coffee. Heart rate will escalate with any tasks. Denies fevers/chills, night sweats, lymphadenopathy, bleeding/bruising.          Past Medical History:   Past Medical History:   Diagnosis Date    Cancer 03/2018    AML    CHF (congestive heart failure)     Diarrhea 9/14/2018    Encounter for blood transfusion        Current Medications:   Current Outpatient Medications   Medication Sig    acyclovir (ZOVIRAX) 200 MG capsule     ALPRAZolam (XANAX) 2 MG Tab Take 1 tablet (2 mg total) by mouth nightly as needed.    ciprofloxacin HCl (CIPRO) 500 MG tablet     clotrimazole (MYCELEX) 10 mg sultana     diphenhydrAMINE (BENADRYL) 50 MG capsule Take 50 mg by mouth nightly as needed for Itching or Insomnia.    escitalopram oxalate (LEXAPRO) 20 MG tablet     famotidine (PEPCID) 20 MG tablet Take 20 mg by mouth.    fluconazole (DIFLUCAN) 200 MG Tab     LEVAQUIN 500 mg tablet Take 1 tablet (500 mg total) by mouth once daily.    LORazepam (ATIVAN) 1 MG tablet Take 1 tablet (1 mg total) by mouth 2 (two) times daily.    metoprolol succinate (TOPROL-XL) 25 MG 24 hr tablet TAKE ONE TABLET BY MOUTH ONCE DAILY    ofloxacin (FLOXIN) 0.3 % otic solution Place 5 drops into the left ear once daily.    omeprazole (PRILOSEC) 40 MG capsule Take 1 capsule (40 mg total) by mouth before breakfast. Take on empty stomach 30-60 minutes before meal    ondansetron (ZOFRAN) 8 MG tablet     ondansetron (ZOFRAN) 8 MG  tablet Take 1 tablet (8 mg total) by mouth every 8 (eight) hours as needed for Nausea.    oxyCODONE (ROXICODONE) 10 mg Tab immediate release tablet Take 1 tablet (10 mg total) by mouth every 6 (six) hours as needed for Pain.    predniSONE (DELTASONE) 10 MG tablet TAKE ONE TABLET BY MOUTH ONCE DAILY AS NEEDED FOR ARTHRITIS FLARE    predniSONE (DELTASONE) 20 MG tablet Take 1 tablet (20 mg total) by mouth once daily.    prochlorperazine (COMPAZINE) 10 MG tablet Take 1 tablet (10 mg total) by mouth every 6 (six) hours as needed.    traMADol (ULTRAM) 50 mg tablet Take 1 tablet (50 mg total) by mouth every 6 (six) hours as needed for Pain.    venetoclax (VENCLEXTA) 100 mg Tab Take 2 tablets (200 mg total) by mouth once daily.    walker (ULTRA-LIGHT ROLLATOR) Misc 1 Units by Misc.(Non-Drug; Combo Route) route once daily.     Current Facility-Administered Medications   Medication    0.9%  NaCl infusion (for blood administration)     ALLERGIES:   Review of patient's allergies indicates:   Allergen Reactions    Methotrexate analogues      Elevated Liver Enzyme    Bactrim [sulfamethoxazole-trimethoprim] Nausea And Vomiting and Rash       Review of Systems:     Review of Systems   Constitutional: Positive for appetite change and fatigue. Negative for chills, diaphoresis, fever and unexpected weight change.   HENT:   Negative for hearing loss, mouth sores, nosebleeds, sore throat, trouble swallowing and voice change.    Eyes: Negative for eye problems and icterus.   Respiratory: Negative for chest tightness, cough, hemoptysis, shortness of breath and wheezing.    Cardiovascular: Negative for chest pain, leg swelling and palpitations.   Gastrointestinal: Negative for abdominal distention, abdominal pain, blood in stool, diarrhea, nausea and vomiting.   Endocrine: Negative for hot flashes.   Genitourinary: Negative for bladder incontinence, difficulty urinating, dysuria and hematuria.    Musculoskeletal: Positive for  arthralgias and myalgias. Negative for back pain, flank pain, gait problem, neck pain and neck stiffness.   Skin: Negative for itching, rash and wound.   Neurological: Negative for dizziness, extremity weakness, gait problem, headaches, numbness, seizures and speech difficulty.   Hematological: Negative for adenopathy. Does not bruise/bleed easily.   Psychiatric/Behavioral: Negative for confusion, depression and sleep disturbance. The patient is nervous/anxious.         Physical Exam:   Limited secondary to virtual visit    ECOG Performance Status: (foot note - ECOG PS provided by Eastern Cooperative Oncology Group) 1 - Symptomatic but completely ambulatory    Karnofsky Performance Score:  80%- Normal Activity with Effort: Some Symptoms of Disease    Labs:   Lab Results   Component Value Date    WBC 0.80 (LL) 12/15/2020    HGB 7.5 (L) 12/15/2020    HCT 23.5 (L) 12/15/2020    PLT 20 (LL) 12/15/2020    ALT 10 12/15/2020    AST 11 12/15/2020     12/15/2020    K 4.5 12/15/2020     12/15/2020    CREATININE 1.2 12/15/2020    BUN 22 12/15/2020    CO2 22 (L) 12/15/2020    TSH 1.913 10/17/2019    INR 1.1 10/16/2019       Imaging: previous imaging has been reviewed    Assessment and Plan:     Mrs. Mac is a pleasant 70 year old female with recently diagnosed AML.      Acute Monocytic Leukemia  -- Admitted from [facility] on [facility]/18 early AM with WBC around 100s, for suspected new non-M3 AML  --lo res HLA typing on 5/26/18 and hi res on 5/27/18 done in anticipation of possible need for future transplant   --Pt with two daughters, two full sisters (in their mid 60s, one with brain aneurysm hx, other one without any medical issues), and one full brother (59 y/o healthy).  --NPM1 +, CEBPA (-), FLT3 (+).  On Midostaurin 50 mg PO BID until Day 14 (held starting 6/8 to 6/11). Stopped when FLAG JENNIFER re-induction started. Restarted Midostaurin at 25 mg bid on day 8 of FLAG JENNIFER induction (6/22), increase to full dose  50 mg bid (6/26) as improvement in liver enzymes. Stopped 7/3/18 due to abnormal cardiac echo.  --day 14 bone marrow biopsy completed 6/11 showing persistent disease with 15% CD 34 positive blasts  --Day 60 of FLAG-JENNIFER, tolerating without difficulty except nausea/vomiting  --day 14 restaging bone marrow biopsy done 6/29 without complication, results with no evidence of AML, FLT 3 negative, will need repeat marrow at count recovery outpatient   --recovery marrow showed no evidence of disease  --HiDAC #1 on 8/14/18, HiDAC#2 on 9/11/18  --Cycle 3 delayed 1 week due to pancytopenia. Started on 10/16/18  --recovery bone marrow showed no signs of residual leukemia  --Will restart Neutropenia prophylaxis  --Bone marrow biopsy done last week reveals early relapse. 11% blasts. We discussed the possibility of re-induction, but she is adamantly against. She does not want to be admitted to the hospital.  --Plan for single agent Gilteritinib 120mg has changed based on next gen sequencing that reports FLT-3 negative at this time  --Verbally consented to decitabine and venetoclax  --Cycle 3 delayed 2 weeks due to quarentine  --Return to clinic next week for lab check    COVID 19 presumed positive  --On quarantine until 12/21    --MRI revealed degenerative changes  --Seen by ENT    Thrombocytopenia  --Previously in the 70-90 range  --20 today    Suspected Leukemia Cutis  --pathology from dermatology confirmed inflammatory changes  --now resolved with topical steroid cream    Abnormal Liver Function Tests  --ALT and AST and Alk phos have again increased substantially since re-starting midostaurin.   --midostaurin started day 8 at 25 mg bid, monitoring liver enzymes closely and improved. Increasing Midostaurin to 50 mg bid 6/26. Stopped Midostaurin (7/3) due to new diagnosis of systolic heart failure EF 35%.  --6/22/18 liver US showing mild intrahepatic dilation, otherwise impression of hepatic steatosis.  Will hold off of MRCP at  this time.    --permanently discontinued midostaurin     Chemotherapy induced cardiomyopathy  --cardiology follow-up as outpatient in 3 months  --repeat 2D echo on 6/15 with preserved EF of 60%.  However 7/2/18 echo with reduced EF 30-35%  --Echo on 8/15/18 was within normal range EF of 60-65%    Rheumatologic issues  --Previously on steroids and methotrexate  --[place] [facility] [place]    30 minutes were spent face to face with the patient and her family to discuss the disease, natural history, treatment options and survival statistics. I have provided the patient with an opportunity to ask questions and have all questions answered to her satisfaction.     she will return to the clinic in 1 week, but knows to call in the interim if symptoms change or should a problem arise.      Laquita Troy MD  Hematology and Medical Oncology  Bone Marrow Transplant  [facility]

## 2020-12-20 NOTE — PROGRESS NOTES
Hematology and Medical Oncology   Follow Up Note     12/21/2020    Primary Oncologic Diagnosis: AML, FLT 3 + and NPMN1+    History of Present Ilness:   Sabrina Badillo) is a pleasant 70 y.o.female presents to clinic following 2 induction therapies for AML. She was in the hospital roughly 50 days to receive 7+3 and then FLAG JENNIFER.    Oncology History:   68 y.o. female admitted overnight for possible new AML. Came in from OSH with elevated WBC of 100.   05/25/2018: Pt is now on hydrea 2 grams BID, allopurinol 300 mg daily, and IVF. Pt may need rasburicase this weekend. She will undergo a BM bx and aspiration today. She is an induction candidate and will not be screened for research trials. She has anxiety for which she usually takes xanax, this was re-started.   05/26/2018 PICC placed. Reports she slept well last night. Anxiety improved with prn xanax. EF 65%, HIV and Hep panel negative. 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  6/12/2018: Day 15 of 7+3 induction, restaging BM bx done yesterday. On Midostaurin. Fever yesterday and BC with gram + cocci in clusters. On cefepime day 2 and will start Vanc today. Labial mucositis improving.   6/13/2018: Day 16 7+3. BC with staph aureus, identification pending. Surveillance blood cultures done today. Afebrile x 48 hours. On cefepime and Vanc. Labial mucositis resolved. No complaints of pain. Nausea controlled. No diarrhea. Primary complaint is fatigue.   6/14/20018: Day 17 of 7+3. Day 14 bone marrow results pending. BC with staph epidermis only sensitive to Vancomycin. Vanc day 3 and cefepime day 4. Vanc trough 12.2 last night. BP remains low but stable. Afebrile x 72 hours.   6/15/2018: Day 18 of 7+3. Day 14 bone marrow biopsy shows persistent disease with 15% blasts. Discussion with patient regarding treatment and she is deciding if she wants to proceed with re-induction vs outpatient maintenance. Temp of 100.4 overnight, unsustained. Day 5 Cefepime and Day 4 of  Vanc for staph epidermis. Repeat cultures NGTD. BP improved. Electrolytes (mag, phos, K and calcium) replaced aggressively this am and will repeat labs this afternoon. No complaints this am.   06/16/2018: Day 19 of 7+3. Day 2 of Flag-JENNIFER. Remains afebrile. Calcium remains low and have increased PO supplementation. Remains on vanc and aztreonam. Somewhat loopy feeling after receiving benadryl.   06/17/2018: Day 20 of 7+3. Day 3 of FLAG-JENNIFER. Multiple electrolyte abnormalities. New bilateral leg and feet swelling.   6/18/2018: Day 21 of 7+3 and Day 4 of FLAG JENNIFER. Tolerating well with some nausea controlled with Zofran. VSS, afebrile. ANC 1900 on Neupogen. Continues Vanc for staph epi bacteremia. Multiple electrolytes replaced today. Complains of edema to lower extremities, not improved after 20 of lasix yesterday. No sob or CP.   6/19/18: Day 22 of 7+3 and Day 5 of FLAG JENNIFER. VSS, afebrile, ANC 3900. Vanc trough elevated and dose adjusted this am. Lower extremity edema improved after 40 of lasix yesterday, net negative 300 cc I&O. Mild nausea, no abdominal pain or diarrhea. Poor appetite but no difficulty eating or taking pills.   6/20/18: Day 23 of 7+3 and Day 6 of FLAG JENNIFER. Patient complains of nausea and vomiting overnight and this am, especially when taking pills. Will add Zyprexa daily in addition to Zofran, compazine, and ativan PRN and will change meds to IV. 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 Afebrile.  No diarrhea, mouth sores or pain.  06/21/2018: Day 24 of 7+3 and Day 7 of FLAG JENNIFER. Nausea better controlled. Continued on Vanc.  6/22/2018: Day 25 of 7+3 and Day 8 of FLAG JENNIFER. Nausea improved some with Zyprexa but did have 1 episode of emesis overnight.continuing meds IV due to vomiting. Remains afebrile. ANC 0. Continues Vanc and Flagyl. Electrolytes replaced.   06/23/2018 : day 26 of 7+3 and Day 9 of FLAG JENNIFER.  Nausea persists,  worsened after taking pills so meds converted to IV.  Encouraged ambulation.  Continue with flagyl for leptotrichia goodfellowii.  RUQ US showed intrahepatic dilation, overall impression hepatic steatosis.  CBD 0.5cm.  No Gallbladder.    06/24/2018 : day 27 of 7+3 and Day 10 of FLAG JENNIFER.  Nausea persisting, able to tolerate some po pills.  Last day of flagyl (day 5).  Still pancytopenic. Appetite still low as po intake triggers nausea.  06/25/2018: day 28 of 7+3 and Day 11 of FLAG JENNIFER. Afebrile, ANC 0. Has completed Flagyl. Will decrease Vanc to 1000 mg q12, trough 19.9, will complete Vanc on 6/27. Liver enzymes stable on Midostaurin. VSS.   6/26/2018: day 29 of 7+3 and day 12 of FLAG JENNIFER. Liver enzymes improved and Midostaurin increased to full dose 50 mg bid. Nausea controlled, afebrile, VSS, NEON.  6/27/2018: day 30 of 7+3 and Day 13 of FLAG JENNIFER. Persistent nausea and emesis x 1 yesterday. Compazine changed to scheduled. Vanc ends today. Afebrile, VSS. Aggressive electrolyte replacement, low electrolytes possibly due to Midostaurin.   6/28/2018: day 31 of 7+3 and Day 14 of FLAG JENNIFER. Nausea improved with scheduled Compazine. Patient has agreed to start converting some IV meds to po. VSS, afebrile, electrolytes wnl today. Only complains of fatigue, new rash noted to upper back and chest and legs, papular rash mildly red.  6/29/2018: Day 32 of 7+3 and Day 15 of FLAG JENNIFER. Day 14 restaging bone marrow biopsy done at bedside today. Patient states nausea improved but she did have emesis last night after taking 9 pm pills. Rash improved. No mouth sores, diarrhea or pain.   7/2/2018: Day 35 of 7+3 and Day 18 of FLAG JENNIFER. Restaging BM bx results pending. Fluid overload over the weekend. Chest x-ray with pulmonary edema. Os sats down to 88%. Placed on O2 at 2 L NC, off O2 this am. Received lasix. Net negative 2.7 L today. 1 episode of nausea, no emesis. Reports anxiety relieved with PRN meds. Electrolytes improved,  afebrile, VSS.   7/3/2018: Day 36 of 7+3 and Day 19 of FLAG JENNIFER. Restaging Bm bx with GABRIEL. Cardiology consulted for abnormal echo, EF 30% with pulmonary HTN and severe L atrial enlargement. EKG with T wave abnormality, possible anterolateral ischemia and prolonged QTc of 530. Medications adjusted. Nausea controlled, no diarrhea. Electrolytes improved. On O2 as needed.   07/04/2018 : Day 37 of 7+3 and Day 20 of FLAG JENNIFER Diarrhea in AM, watery, no associated abdominal pain, nausea, or vomiting.    07/05/2018: Day 38 of 7+3 ane Day 21 of FLAG JENNIFER. No CP or SOB, cardiology following. On RA. All meds changed to po, denies nausea or vomiting and no diarrhea. VSS, afebrile.   7/6/2018: Day 22 of FLAG JENNIFER. Continues to tolerate po meds with no nausea. Afebrile. Awaiting count recovery for discharge.  7/7/2018-/8/2018: Day 40/41 of 7+3, Day 23/24 of FLAG JENNIFER.  Improving clinically.  Started on mag oxide per patient request.  Labs showing signs of ANC recovery.  7/9/2018: Day 25 FLAG JENNIFER, , continues Neupogen. Received platelets today. Afebrile, VSS. Tolerating all oral meds with no nausea or vomiting. Only complains of fatigue.   7/10/2018: Day 26 FLAG JENNIFER,  today. Had 2 episodes of vomiting and diarrhea last night. Feeling better. Afebrile, VSS.   7/11/2018: Day 27 FLAG JENNIFER,  today. No vomiting or diarrhea overnight and no nausea. Afebrile, VSS. Complains of back pain (bone pain) suspect due to count recovery. Relieved with oxy IR and Zyrtec started.  07/12/2018: Day 28 flag jennifer, engrafted today with ANC of 730. Back pain improved with zyrtec. Scheduled for IT chemo tomorrow at 1:30 pm and discharge home thereafter. Will likely need plt transfusion prior to IT chemo tomorrow   7/13/2018: Day 29 of FLAG JENNIFER. ANC up to 1300 today. Transfusing platelets today prior to LP for IT chemo. Patient continues to complain of bone pain, mostly to back and legs. No nausea, diarrhea, sob. Afebrile and VSS.    --hospitalized 7/23-7/25 for C.diff diarrhea, neutropenic fever.  While inpatient she developed pink blisters on her palm that were concerning for relapse.  --palm biopsy on 7/26/18 for suspicion of leukemia cutis was also negative for sign of relapse  --bone marrow biopsy 7/31/18 was negative for signs of relapse   --Admitted on 8/14/18 for HiDAC#1  --Admitted on 9/11/18 for HiDAC#2  --Admitted on 10/16/18 for HiDAC#3  --Post consolidation bone marrow biopsy on 12/27/2018: AML FISH (BM) Interpretation: The result is within normal limits for the AML FISH panel. No evidence of residual disease.  --Bone Marrow Biopsy on 5/9/19 NO MORPHOLOGIC EVIDENCE OF RESIDUAL ACUTE MYELOID LEUKEMIA. RECOMMEND NGS STUDY TO RULE OUT RESIDUAL DISEASE. deletion 20q  --Bone Marrow Biopsy on 9/23/2020: CELLULARITY=40-50%, TRILINEAGE HEMATOPOIETIC ACTIVITY (M/E= 0.6:1). ERYTHROID HYPERPLASIA, DYSERYTHROPOIESIS, AND INCREASED BLAST (11%). SEE COMMENT.  FOCAL GRADE 1 RETICULAR FIBROSIS.  --Decitabine + Venetoclax: C1D1: 10/12/2020, C2D1: 11/9      Interval History:   Mrs. Mac is a 70 y.o. patient of Dr. Troy who presents today for routine follow up of her AML. Cycle 3 of Decitabine had been delayed 2 weeks due to quarantine for COVID,  [place] tested positive on 12/8. She will start cycle 3 today.  She continues with fatigue; still very easily and needs to rest after doing simple tasks like pouring a cup of coffee. Denies fevers/chills, night sweats, lymphadenopathy, bleeding/bruising. Still having intermittent nausea. Has constipation. Chronic right shoulder pain, taking prn opioids    Past Medical History:   Past Medical History:   Diagnosis Date    Cancer 03/2018    AML    CHF (congestive heart failure)     Diarrhea 9/14/2018    Encounter for blood transfusion        Current Medications:   Current Outpatient Medications   Medication Sig    acyclovir (ZOVIRAX) 200 MG capsule     ciprofloxacin HCl (CIPRO) 500 MG tablet      clotrimazole (MYCELEX) 10 mg sultana     diphenhydrAMINE (BENADRYL) 50 MG capsule Take 50 mg by mouth nightly as needed for Itching or Insomnia.    escitalopram oxalate (LEXAPRO) 20 MG tablet     famotidine (PEPCID) 20 MG tablet Take 20 mg by mouth.    fluconazole (DIFLUCAN) 200 MG Tab     LEVAQUIN 500 mg tablet Take 1 tablet (500 mg total) by mouth once daily.    LORazepam (ATIVAN) 1 MG tablet Take 1 tablet (1 mg total) by mouth 2 (two) times daily.    metoprolol succinate (TOPROL-XL) 25 MG 24 hr tablet TAKE ONE TABLET BY MOUTH ONCE DAILY    ofloxacin (FLOXIN) 0.3 % otic solution Place 5 drops into the left ear once daily.    ondansetron (ZOFRAN) 8 MG tablet     ondansetron (ZOFRAN) 8 MG tablet Take 1 tablet (8 mg total) by mouth every 8 (eight) hours as needed for Nausea.    oxyCODONE (ROXICODONE) 10 mg Tab immediate release tablet Take 1 tablet (10 mg total) by mouth every 6 (six) hours as needed for Pain.    prochlorperazine (COMPAZINE) 10 MG tablet Take 1 tablet (10 mg total) by mouth every 6 (six) hours as needed.    venetoclax (VENCLEXTA) 100 mg Tab Take 2 tablets (200 mg total) by mouth once daily.    walker (ULTRA-LIGHT ROLLATOR) Misc 1 Units by Misc.(Non-Drug; Combo Route) route once daily.    ALPRAZolam (XANAX) 2 MG Tab Take 1 tablet (2 mg total) by mouth nightly as needed.    omeprazole (PRILOSEC) 40 MG capsule Take 1 capsule (40 mg total) by mouth before breakfast. Take on empty stomach 30-60 minutes before meal     Current Facility-Administered Medications   Medication    0.9%  NaCl infusion (for blood administration)    0.9%  NaCl infusion (for blood administration)     ALLERGIES:   Review of patient's allergies indicates:   Allergen Reactions    Methotrexate analogues      Elevated Liver Enzyme    Bactrim [sulfamethoxazole-trimethoprim] Nausea And Vomiting and Rash       Review of Systems:     Review of Systems   Constitutional: Positive for appetite change and fatigue.  Negative for chills, diaphoresis, fever and unexpected weight change.   HENT:   Negative for hearing loss, mouth sores, nosebleeds, sore throat, trouble swallowing and voice change.    Eyes: Negative for eye problems and icterus.   Respiratory: Negative for chest tightness, cough, hemoptysis, shortness of breath and wheezing.    Cardiovascular: Negative for chest pain, leg swelling and palpitations.   Gastrointestinal: Negative for abdominal distention, abdominal pain, blood in stool, diarrhea, nausea and vomiting.   Endocrine: Negative for hot flashes.   Genitourinary: Negative for bladder incontinence, difficulty urinating, dysuria and hematuria.    Musculoskeletal: Positive for arthralgias and myalgias. Negative for back pain, flank pain, gait problem, neck pain and neck stiffness.   Skin: Negative for itching, rash and wound.   Neurological: Negative for dizziness, extremity weakness, gait problem, headaches, numbness, seizures and speech difficulty.   Hematological: Negative for adenopathy. Does not bruise/bleed easily.   Psychiatric/Behavioral: Negative for confusion, depression and sleep disturbance. The patient is nervous/anxious.         Physical Exam:   Physical Exam  Vitals signs reviewed.   Constitutional:       Appearance: She is well-developed.   HENT:      Head: Normocephalic and atraumatic.      Right Ear: External ear normal.      Left Ear: External ear normal.      Mouth/Throat:      Pharynx: No posterior oropharyngeal erythema.   Eyes:      Conjunctiva/sclera: Conjunctivae normal.   Neck:      Musculoskeletal: Normal range of motion.   Cardiovascular:      Rate and Rhythm: Normal rate and regular rhythm.      Heart sounds: Normal heart sounds. No murmur.   Pulmonary:      Effort: Pulmonary effort is normal. No respiratory distress.      Breath sounds: Normal breath sounds. No stridor. No wheezing or rales.   Abdominal:      General: Bowel sounds are normal. There is no distension.      Palpations:  Abdomen is soft.      Tenderness: There is no abdominal tenderness.   Musculoskeletal: Normal range of motion.      Right lower leg: No edema.      Left lower leg: No edema.   Skin:     General: Skin is warm and dry.      Findings: No rash.   Neurological:      Mental Status: She is alert and oriented to person, place, and time.   Psychiatric:         Behavior: Behavior normal.         Thought Content: Thought content normal.         Judgment: Judgment normal.         ECOG Performance Status: (foot note - ECOG PS provided by Eastern Cooperative Oncology Group) 1 - Symptomatic but completely ambulatory    Karnofsky Performance Score:  80%- Normal Activity with Effort: Some Symptoms of Disease    Labs:   Lab Results   Component Value Date    WBC 1.28 (LL) 12/21/2020    HGB 6.8 (L) 12/21/2020    HCT 20.8 (L) 12/21/2020    PLT 31 (LL) 12/21/2020    ALT 9 (L) 12/21/2020    AST 12 12/21/2020     12/21/2020    K 4.6 12/21/2020     12/21/2020    CREATININE 1.4 12/21/2020    BUN 21 12/21/2020    CO2 24 12/21/2020    TSH 1.913 10/17/2019    INR 1.1 10/16/2019       Imaging: previous imaging has been reviewed    Assessment and Plan:     1. 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 Pancytopenia due to chemotherapy    3. Chemotherapy induced cardiomyopathy    4. Dislocation of temporomandibular joint, sequela    5. Lack of appetite    6. Pain    7. Drug-induced constipation        AML  -- Admitted from [facility] on 5/25/18 early AM with WBC around 100s, for suspected new non-M3 AML  --lo res HLA typing on 5/26/18 and hi res on 5/27/18 done in anticipation of possible need for future transplant   --Pt with two daughters, two full sisters (in their mid 60s, one with brain aneurysm hx, other one without any medical issues), and one full brother (59 y/o healthy).  --NPM1 +, CEBPA (-), FLT3 (+).  On Midostaurin 50 mg PO BID until Day 14 (held starting 6/8 to 6/11). Stopped when FLAG JENNIFER re-induction started. Restarted  Midostaurin at 25 mg bid on day 8 of FLAG JENNIFER induction (6/22), increase to full dose 50 mg bid (6/26) as improvement in liver enzymes. Stopped 7/3/18 due to abnormal cardiac echo.  --day 14 bone marrow biopsy completed 6/11 showing persistent disease with 15% CD 34 positive blasts  --Day 60 of FLAG-JENNIFER, tolerating without difficulty except nausea/vomiting  --day 14 restaging bone marrow biopsy done 6/29 without complication, results with no evidence of AML, FLT 3 negative, will need repeat marrow at count recovery outpatient   --recovery marrow showed no evidence of disease  --HiDAC #1 on 8/14/18, HiDAC#2 on 9/11/18  --Cycle 3 delayed 1 week due to pancytopenia. Started on 10/16/18  --recovery bone marrow showed no signs of residual leukemia  --Bone marrow biopsy done last week reveals early relapse. 11% blasts. We discussed the possibility of re-induction, but she is adamantly against. She does not want to be admitted to the hospital.  --Plan for single agent Gilteritinib 120mg has changed based on next gen sequencing that reports FLT-3 negative at this time  --Verbally consented to decitabine and venetoclax  --Cycle 3 delayed 2 weeks due to COVID quarantine; will start cycle 3 today  --Since 5th dose falls on jesse, will treat on Saturday 12/26 or Monday 12/28 (pt preference) per Dr. Troy    COVID 19 presumed positive  --On quarantine until 12/21 ( tested positive)    Pancytopenia  --Transfuse for Hgb <7, Plt <10K or bleeding  --continue ppx acyclovir, fluconazole and levaquin due to neutropenia  --will receive 1unit RBC today    Suspected Leukemia Cutis  --pathology from dermatology confirmed inflammatory changes  --now resolved with topical steroid cream    Abnormal Liver Function Tests  --ALT and AST and Alk phos have again increased substantially since re-starting midostaurin.   --midostaurin started day 8 at 25 mg bid, monitoring liver enzymes closely and improved. Increasing Midostaurin to 50  mg bid 6/26. Stopped Midostaurin (7/3) due to new diagnosis of systolic heart failure EF 35%.  --6/22/18 liver US showing mild intrahepatic dilation, otherwise impression of hepatic steatosis.  Will hold off of MRCP at this time.    --permanently discontinued midostaurin     Chemotherapy induced cardiomyopathy  --cardiology follow-up as outpatient in 3 months  --repeat 2D echo on 6/15 with preserved EF of 60%.  However 7/2/18 echo with reduced EF 30-35%  --Echo on 8/15/18 was within normal range EF of 60-65%    Rheumatologic issues  --Previously on steroids and methotrexate  --working with [facility]    Dislocation of Jaw  --MRI complete 11/22/19 showing degenerative changes  --seen by ENT    Lack of appetite  --will refer patient to hem/onc nutrition    Constipation  --increasing prunes in diet  --add OTC stool softener or laxative as needed      Follow up:  Chemo chair on 12/28  Twice weekly labs (CBC, CMP, type and screen) starting Monday 12/28/20 through 1/15/21  Labs (CBC, CMP, LDH, uric acid, type and screen) and appointment with Dr. Troy on 1/18/21  Needs chair added for cycle 4 dacogen 1/18 following appointment    Will reach out to social work with help getting patient home wheelchair      Sydnie Jeff NP  Hematology/Oncology/BMT

## 2020-12-21 ENCOUNTER — INFUSION (OUTPATIENT)
Dept: INFUSION THERAPY | Facility: HOSPITAL | Age: 70
End: 2020-12-21
Attending: INTERNAL MEDICINE
Payer: MEDICARE

## 2020-12-21 ENCOUNTER — OFFICE VISIT (OUTPATIENT)
Dept: HEMATOLOGY/ONCOLOGY | Facility: CLINIC | Age: 70
End: 2020-12-21
Payer: MEDICARE

## 2020-12-21 VITALS
HEART RATE: 64 BPM | RESPIRATION RATE: 18 BRPM | DIASTOLIC BLOOD PRESSURE: 55 MMHG | SYSTOLIC BLOOD PRESSURE: 113 MMHG | TEMPERATURE: 98 F

## 2020-12-21 VITALS
HEIGHT: 63 IN | WEIGHT: 174.69 LBS | BODY MASS INDEX: 30.95 KG/M2 | DIASTOLIC BLOOD PRESSURE: 76 MMHG | SYSTOLIC BLOOD PRESSURE: 124 MMHG | HEART RATE: 85 BPM | RESPIRATION RATE: 16 BRPM | OXYGEN SATURATION: 99 %

## 2020-12-21 DIAGNOSIS — C92.02 ACUTE MYELOID LEUKEMIA IN RELAPSE: Primary | ICD-10-CM

## 2020-12-21 DIAGNOSIS — C93.01 ACUTE MONOCYTIC LEUKEMIA IN REMISSION: ICD-10-CM

## 2020-12-21 DIAGNOSIS — I42.7 CHEMOTHERAPY INDUCED CARDIOMYOPATHY: ICD-10-CM

## 2020-12-21 DIAGNOSIS — K59.03 DRUG-INDUCED CONSTIPATION: ICD-10-CM

## 2020-12-21 DIAGNOSIS — D61.810 PANCYTOPENIA DUE TO CHEMOTHERAPY: ICD-10-CM

## 2020-12-21 DIAGNOSIS — D61.810 PANCYTOPENIA DUE TO CHEMOTHERAPY: Primary | ICD-10-CM

## 2020-12-21 DIAGNOSIS — S03.00XS DISLOCATION OF TEMPOROMANDIBULAR JOINT, SEQUELA: ICD-10-CM

## 2020-12-21 DIAGNOSIS — T45.1X5A CHEMOTHERAPY INDUCED CARDIOMYOPATHY: ICD-10-CM

## 2020-12-21 DIAGNOSIS — R52 PAIN: ICD-10-CM

## 2020-12-21 DIAGNOSIS — R53.81 DEBILITY: ICD-10-CM

## 2020-12-21 DIAGNOSIS — R63.0 LACK OF APPETITE: ICD-10-CM

## 2020-12-21 LAB
BLD PROD TYP BPU: NORMAL
BLOOD UNIT EXPIRATION DATE: NORMAL
BLOOD UNIT TYPE CODE: 600
BLOOD UNIT TYPE: NORMAL
CODING SYSTEM: NORMAL
DISPENSE STATUS: NORMAL
NUM UNITS TRANS PACKED RBC: NORMAL

## 2020-12-21 PROCEDURE — 63600175 PHARM REV CODE 636 W HCPCS: Mod: JG | Performed by: INTERNAL MEDICINE

## 2020-12-21 PROCEDURE — 96413 CHEMO IV INFUSION 1 HR: CPT

## 2020-12-21 PROCEDURE — 25000003 PHARM REV CODE 250: Performed by: INTERNAL MEDICINE

## 2020-12-21 PROCEDURE — 99215 OFFICE O/P EST HI 40 MIN: CPT | Mod: S$PBB,,, | Performed by: NURSE PRACTITIONER

## 2020-12-21 PROCEDURE — 25000003 PHARM REV CODE 250: Performed by: NURSE PRACTITIONER

## 2020-12-21 PROCEDURE — 99999 PR PBB SHADOW E&M-EST. PATIENT-LVL IV: ICD-10-PCS | Mod: PBBFAC,,, | Performed by: NURSE PRACTITIONER

## 2020-12-21 PROCEDURE — 86920 COMPATIBILITY TEST SPIN: CPT

## 2020-12-21 PROCEDURE — P9040 RBC LEUKOREDUCED IRRADIATED: HCPCS

## 2020-12-21 PROCEDURE — 99215 PR OFFICE/OUTPT VISIT, EST, LEVL V, 40-54 MIN: ICD-10-PCS | Mod: S$PBB,,, | Performed by: NURSE PRACTITIONER

## 2020-12-21 PROCEDURE — 99999 PR PBB SHADOW E&M-EST. PATIENT-LVL IV: CPT | Mod: PBBFAC,,, | Performed by: NURSE PRACTITIONER

## 2020-12-21 PROCEDURE — 99214 OFFICE O/P EST MOD 30 MIN: CPT | Mod: PBBFAC | Performed by: NURSE PRACTITIONER

## 2020-12-21 PROCEDURE — 36430 TRANSFUSION BLD/BLD COMPNT: CPT

## 2020-12-21 RX ORDER — SODIUM CHLORIDE 0.9 % (FLUSH) 0.9 %
10 SYRINGE (ML) INJECTION
Status: CANCELLED | OUTPATIENT
Start: 2020-12-22

## 2020-12-21 RX ORDER — HYDROCODONE BITARTRATE AND ACETAMINOPHEN 500; 5 MG/1; MG/1
TABLET ORAL ONCE
Status: CANCELLED | OUTPATIENT
Start: 2020-12-21 | End: 2020-12-21

## 2020-12-21 RX ORDER — DIPHENHYDRAMINE HCL 25 MG
25 CAPSULE ORAL
Status: CANCELLED | OUTPATIENT
Start: 2020-12-21

## 2020-12-21 RX ORDER — SODIUM CHLORIDE 0.9 % (FLUSH) 0.9 %
10 SYRINGE (ML) INJECTION
Status: CANCELLED | OUTPATIENT
Start: 2020-12-23

## 2020-12-21 RX ORDER — SODIUM CHLORIDE 0.9 % (FLUSH) 0.9 %
10 SYRINGE (ML) INJECTION
Status: CANCELLED | OUTPATIENT
Start: 2020-12-28

## 2020-12-21 RX ORDER — SODIUM CHLORIDE 0.9 % (FLUSH) 0.9 %
10 SYRINGE (ML) INJECTION
Status: CANCELLED | OUTPATIENT
Start: 2020-12-21

## 2020-12-21 RX ORDER — SODIUM CHLORIDE 0.9 % (FLUSH) 0.9 %
10 SYRINGE (ML) INJECTION
Status: CANCELLED | OUTPATIENT
Start: 2020-12-24

## 2020-12-21 RX ORDER — HEPARIN 100 UNIT/ML
500 SYRINGE INTRAVENOUS
Status: CANCELLED | OUTPATIENT
Start: 2020-12-24

## 2020-12-21 RX ORDER — HYDROCODONE BITARTRATE AND ACETAMINOPHEN 500; 5 MG/1; MG/1
TABLET ORAL
Status: DISCONTINUED | OUTPATIENT
Start: 2020-12-21 | End: 2021-02-10

## 2020-12-21 RX ORDER — SODIUM CHLORIDE 0.9 % (FLUSH) 0.9 %
10 SYRINGE (ML) INJECTION
Status: DISCONTINUED | OUTPATIENT
Start: 2020-12-21 | End: 2020-12-21 | Stop reason: HOSPADM

## 2020-12-21 RX ORDER — HEPARIN 100 UNIT/ML
500 SYRINGE INTRAVENOUS
Status: CANCELLED | OUTPATIENT
Start: 2020-12-28

## 2020-12-21 RX ORDER — DIPHENHYDRAMINE HCL 25 MG
25 CAPSULE ORAL
Status: COMPLETED | OUTPATIENT
Start: 2020-12-21 | End: 2020-12-21

## 2020-12-21 RX ORDER — HEPARIN 100 UNIT/ML
500 SYRINGE INTRAVENOUS
Status: CANCELLED | OUTPATIENT
Start: 2020-12-21

## 2020-12-21 RX ORDER — HEPARIN 100 UNIT/ML
500 SYRINGE INTRAVENOUS
Status: CANCELLED | OUTPATIENT
Start: 2020-12-23

## 2020-12-21 RX ORDER — HEPARIN 100 UNIT/ML
500 SYRINGE INTRAVENOUS
Status: DISCONTINUED | OUTPATIENT
Start: 2020-12-21 | End: 2020-12-21 | Stop reason: HOSPADM

## 2020-12-21 RX ORDER — HYDROCODONE BITARTRATE AND ACETAMINOPHEN 500; 5 MG/1; MG/1
TABLET ORAL ONCE
Status: DISCONTINUED | OUTPATIENT
Start: 2020-12-21 | End: 2020-12-21 | Stop reason: HOSPADM

## 2020-12-21 RX ORDER — HEPARIN 100 UNIT/ML
500 SYRINGE INTRAVENOUS
Status: CANCELLED | OUTPATIENT
Start: 2020-12-22

## 2020-12-21 RX ORDER — ACETAMINOPHEN 325 MG/1
650 TABLET ORAL
Status: COMPLETED | OUTPATIENT
Start: 2020-12-21 | End: 2020-12-21

## 2020-12-21 RX ORDER — ACETAMINOPHEN 325 MG/1
650 TABLET ORAL
Status: CANCELLED | OUTPATIENT
Start: 2020-12-21

## 2020-12-21 RX ORDER — OXYCODONE HYDROCHLORIDE 10 MG/1
10 TABLET ORAL EVERY 6 HOURS PRN
Qty: 60 TABLET | Refills: 0 | Status: SHIPPED | OUTPATIENT
Start: 2020-12-21 | End: 2021-05-04 | Stop reason: SDUPTHER

## 2020-12-21 RX ADMIN — ACETAMINOPHEN 650 MG: 325 TABLET ORAL at 03:12

## 2020-12-21 RX ADMIN — DECITABINE 40 MG: 50 INJECTION, POWDER, LYOPHILIZED, FOR SOLUTION INTRAVENOUS at 02:12

## 2020-12-21 RX ADMIN — DIPHENHYDRAMINE HYDROCHLORIDE 25 MG: 25 CAPSULE ORAL at 03:12

## 2020-12-21 NOTE — Clinical Note
Chemo chair on 12/28 (please make labs and chemo for the 28th mid day)  Twice weekly labs (CBC, CMP, type and screen) starting Monday 12/28/20 through 1/15/21  Labs (CBC, CMP, LDH, uric acid, type and screen) and appointment with Dr. Troy on 1/18/21  Needs chair added for cycle 4 dacogen 1/18 following appointment

## 2020-12-22 ENCOUNTER — INFUSION (OUTPATIENT)
Dept: INFUSION THERAPY | Facility: HOSPITAL | Age: 70
End: 2020-12-22
Attending: INTERNAL MEDICINE
Payer: MEDICARE

## 2020-12-22 VITALS
DIASTOLIC BLOOD PRESSURE: 58 MMHG | RESPIRATION RATE: 18 BRPM | WEIGHT: 174.69 LBS | HEART RATE: 81 BPM | BODY MASS INDEX: 30.95 KG/M2 | HEIGHT: 63 IN | SYSTOLIC BLOOD PRESSURE: 91 MMHG | TEMPERATURE: 98 F

## 2020-12-22 DIAGNOSIS — C92.02 ACUTE MYELOID LEUKEMIA IN RELAPSE: Primary | ICD-10-CM

## 2020-12-22 DIAGNOSIS — F41.9 ANXIETY: ICD-10-CM

## 2020-12-22 DIAGNOSIS — C93.01 ACUTE MONOCYTIC LEUKEMIA IN REMISSION: ICD-10-CM

## 2020-12-22 PROCEDURE — 25000003 PHARM REV CODE 250: Mod: PN | Performed by: INTERNAL MEDICINE

## 2020-12-22 PROCEDURE — 96413 CHEMO IV INFUSION 1 HR: CPT | Mod: PN

## 2020-12-22 PROCEDURE — 63600175 PHARM REV CODE 636 W HCPCS: Mod: JG,PN | Performed by: INTERNAL MEDICINE

## 2020-12-22 RX ORDER — ESCITALOPRAM OXALATE 20 MG/1
20 TABLET ORAL NIGHTLY
Qty: 30 TABLET | Refills: 11 | Status: SHIPPED | OUTPATIENT
Start: 2020-12-22 | End: 2022-01-12

## 2020-12-22 RX ADMIN — DECITABINE 40 MG: 50 INJECTION, POWDER, LYOPHILIZED, FOR SOLUTION INTRAVENOUS at 03:12

## 2020-12-22 NOTE — PLAN OF CARE
Problem: Fatigue  Goal: Improved Activity Tolerance  12/22/2020 1628 by Mer Esparza, RN  Outcome: Ongoing, Progressing  12/22/2020 1628 by Mer Esparza, RN  Outcome: Ongoing, Progressing   Pt tolerated treatment well. D/C'd in stable condition to  in waiting room.

## 2020-12-23 ENCOUNTER — INFUSION (OUTPATIENT)
Dept: INFUSION THERAPY | Facility: HOSPITAL | Age: 70
End: 2020-12-23
Attending: INTERNAL MEDICINE
Payer: MEDICARE

## 2020-12-23 VITALS
SYSTOLIC BLOOD PRESSURE: 103 MMHG | TEMPERATURE: 98 F | DIASTOLIC BLOOD PRESSURE: 52 MMHG | BODY MASS INDEX: 30.95 KG/M2 | RESPIRATION RATE: 16 BRPM | WEIGHT: 174.69 LBS | HEART RATE: 70 BPM | HEIGHT: 63 IN

## 2020-12-23 DIAGNOSIS — D61.810 PANCYTOPENIA DUE TO CHEMOTHERAPY: ICD-10-CM

## 2020-12-23 DIAGNOSIS — C92.02 ACUTE MYELOID LEUKEMIA IN RELAPSE: Primary | ICD-10-CM

## 2020-12-23 DIAGNOSIS — C93.01 ACUTE MONOCYTIC LEUKEMIA IN REMISSION: ICD-10-CM

## 2020-12-23 PROCEDURE — 96413 CHEMO IV INFUSION 1 HR: CPT | Mod: PN

## 2020-12-23 PROCEDURE — 25000003 PHARM REV CODE 250: Mod: PN | Performed by: INTERNAL MEDICINE

## 2020-12-23 PROCEDURE — 63600175 PHARM REV CODE 636 W HCPCS: Mod: JG,PN | Performed by: INTERNAL MEDICINE

## 2020-12-23 RX ADMIN — SODIUM CHLORIDE: 9 INJECTION, SOLUTION INTRAVENOUS at 02:12

## 2020-12-23 RX ADMIN — DECITABINE 40 MG: 50 INJECTION, POWDER, LYOPHILIZED, FOR SOLUTION INTRAVENOUS at 02:12

## 2020-12-23 NOTE — PLAN OF CARE
Pt tolerated Day 3 of Dacogen well.  To RTC tomorrow for treatment.  IV left in place, saline locked and secured. Instructed to call MD with any problems.

## 2020-12-24 ENCOUNTER — PATIENT MESSAGE (OUTPATIENT)
Dept: HEMATOLOGY/ONCOLOGY | Facility: CLINIC | Age: 70
End: 2020-12-24

## 2020-12-24 ENCOUNTER — INFUSION (OUTPATIENT)
Dept: INFUSION THERAPY | Facility: HOSPITAL | Age: 70
End: 2020-12-24
Attending: INTERNAL MEDICINE
Payer: MEDICARE

## 2020-12-24 VITALS
HEART RATE: 68 BPM | RESPIRATION RATE: 16 BRPM | SYSTOLIC BLOOD PRESSURE: 94 MMHG | TEMPERATURE: 98 F | DIASTOLIC BLOOD PRESSURE: 53 MMHG | WEIGHT: 174.69 LBS | BODY MASS INDEX: 30.95 KG/M2 | HEIGHT: 63 IN

## 2020-12-24 DIAGNOSIS — C93.01 ACUTE MONOCYTIC LEUKEMIA IN REMISSION: ICD-10-CM

## 2020-12-24 DIAGNOSIS — C92.02 ACUTE MYELOID LEUKEMIA IN RELAPSE: Primary | ICD-10-CM

## 2020-12-24 PROCEDURE — 96413 CHEMO IV INFUSION 1 HR: CPT | Mod: PN

## 2020-12-24 PROCEDURE — 25000003 PHARM REV CODE 250: Mod: PN | Performed by: INTERNAL MEDICINE

## 2020-12-24 PROCEDURE — A4216 STERILE WATER/SALINE, 10 ML: HCPCS | Mod: PN | Performed by: INTERNAL MEDICINE

## 2020-12-24 PROCEDURE — 63600175 PHARM REV CODE 636 W HCPCS: Mod: JG,PN | Performed by: INTERNAL MEDICINE

## 2020-12-24 RX ORDER — SODIUM CHLORIDE 0.9 % (FLUSH) 0.9 %
10 SYRINGE (ML) INJECTION
Status: DISCONTINUED | OUTPATIENT
Start: 2020-12-24 | End: 2020-12-24 | Stop reason: HOSPADM

## 2020-12-24 RX ADMIN — DECITABINE 40 MG: 50 INJECTION, POWDER, LYOPHILIZED, FOR SOLUTION INTRAVENOUS at 11:12

## 2020-12-24 RX ADMIN — SODIUM CHLORIDE: 9 INJECTION, SOLUTION INTRAVENOUS at 11:12

## 2020-12-24 RX ADMIN — Medication 10 ML: at 11:12

## 2020-12-24 RX ADMIN — Medication 10 ML: at 12:12

## 2020-12-24 NOTE — PLAN OF CARE
Adequate for discharge.   Pt tolerated Dacogen infusion without noted distress.  Schedule given and reviewed with pt.  All questions answered. Advised to call MD with any questions or concerns.  Escorted to lobby via wheelchair where  was waiting.

## 2020-12-28 ENCOUNTER — INFUSION (OUTPATIENT)
Dept: INFUSION THERAPY | Facility: HOSPITAL | Age: 70
End: 2020-12-28
Attending: INTERNAL MEDICINE
Payer: MEDICARE

## 2020-12-28 VITALS
BODY MASS INDEX: 30.97 KG/M2 | DIASTOLIC BLOOD PRESSURE: 62 MMHG | HEART RATE: 71 BPM | HEIGHT: 63 IN | WEIGHT: 174.81 LBS | RESPIRATION RATE: 16 BRPM | TEMPERATURE: 97 F | SYSTOLIC BLOOD PRESSURE: 108 MMHG

## 2020-12-28 DIAGNOSIS — C92.02 ACUTE MYELOID LEUKEMIA IN RELAPSE: Primary | ICD-10-CM

## 2020-12-28 DIAGNOSIS — C93.01 ACUTE MONOCYTIC LEUKEMIA IN REMISSION: ICD-10-CM

## 2020-12-28 DIAGNOSIS — D61.810 PANCYTOPENIA DUE TO CHEMOTHERAPY: ICD-10-CM

## 2020-12-28 PROCEDURE — 96413 CHEMO IV INFUSION 1 HR: CPT | Mod: PN

## 2020-12-28 PROCEDURE — 63600175 PHARM REV CODE 636 W HCPCS: Mod: JG,PN | Performed by: INTERNAL MEDICINE

## 2020-12-28 PROCEDURE — 25000003 PHARM REV CODE 250: Mod: PN | Performed by: INTERNAL MEDICINE

## 2020-12-28 RX ORDER — SODIUM CHLORIDE 0.9 % (FLUSH) 0.9 %
10 SYRINGE (ML) INJECTION
Status: DISCONTINUED | OUTPATIENT
Start: 2020-12-28 | End: 2020-12-28 | Stop reason: HOSPADM

## 2020-12-28 RX ADMIN — SODIUM CHLORIDE: 9 INJECTION, SOLUTION INTRAVENOUS at 04:12

## 2020-12-28 RX ADMIN — DECITABINE 40 MG: 50 INJECTION, POWDER, LYOPHILIZED, FOR SOLUTION INTRAVENOUS at 04:12

## 2020-12-31 ENCOUNTER — LAB VISIT (OUTPATIENT)
Dept: LAB | Facility: HOSPITAL | Age: 70
End: 2020-12-31
Attending: INTERNAL MEDICINE
Payer: MEDICARE

## 2020-12-31 DIAGNOSIS — D61.810 PANCYTOPENIA DUE TO CHEMOTHERAPY: ICD-10-CM

## 2020-12-31 DIAGNOSIS — C92.02 ACUTE MYELOID LEUKEMIA IN RELAPSE: ICD-10-CM

## 2020-12-31 LAB
ABO + RH BLD: NORMAL
ALBUMIN SERPL BCP-MCNC: 3.9 G/DL (ref 3.5–5.2)
ALP SERPL-CCNC: 91 U/L (ref 38–145)
ALT SERPL W/O P-5'-P-CCNC: 14 U/L (ref 0–35)
ANION GAP SERPL CALC-SCNC: 8 MMOL/L (ref 8–16)
ANISOCYTOSIS BLD QL SMEAR: SLIGHT
AST SERPL-CCNC: 26 U/L (ref 14–36)
BASOPHILS # BLD AUTO: 0 K/UL (ref 0–0.2)
BASOPHILS NFR BLD: 0 % (ref 0–1.9)
BILIRUB SERPL-MCNC: 0.6 MG/DL (ref 0.2–1.3)
BLD GP AB SCN CELLS X3 SERPL QL: NORMAL
CALCIUM SERPL-MCNC: 9.7 MG/DL (ref 8.4–10.2)
CHLORIDE SERPL-SCNC: 101 MMOL/L (ref 95–110)
CO2 SERPL-SCNC: 26 MMOL/L (ref 22–31)
CREAT SERPL-MCNC: 1.01 MG/DL (ref 0.5–1.4)
DIFFERENTIAL METHOD: ABNORMAL
EOSINOPHIL # BLD AUTO: 0 K/UL (ref 0–0.5)
EOSINOPHIL NFR BLD: 0 % (ref 0–8)
ERYTHROCYTE [DISTWIDTH] IN BLOOD BY AUTOMATED COUNT: 14.2 % (ref 11.5–14.5)
EST. GFR  (AFRICAN AMERICAN): >60 ML/MIN/1.73 M^2
EST. GFR  (NON AFRICAN AMERICAN): 57 ML/MIN/1.73 M^2
GLUCOSE SERPL-MCNC: 93 MG/DL (ref 70–110)
HCT VFR BLD AUTO: 20.9 % (ref 37–48.5)
HGB BLD-MCNC: 6.8 G/DL (ref 12–16)
HYPOCHROMIA BLD QL SMEAR: ABNORMAL
IMM GRANULOCYTES # BLD AUTO: 0.02 K/UL (ref 0–0.04)
IMM GRANULOCYTES NFR BLD AUTO: 1.7 % (ref 0–0.5)
LYMPHOCYTES # BLD AUTO: 0.6 K/UL (ref 1–4.8)
LYMPHOCYTES NFR BLD: 50 % (ref 18–48)
MCH RBC QN AUTO: 28.7 PG (ref 27–31)
MCHC RBC AUTO-ENTMCNC: 32.5 G/DL (ref 32–36)
MCV RBC AUTO: 88 FL (ref 82–98)
MONOCYTES # BLD AUTO: 0.1 K/UL (ref 0.3–1)
MONOCYTES NFR BLD: 9.5 % (ref 4–15)
NEUTROPHILS # BLD AUTO: 0.5 K/UL (ref 1.8–7.7)
NEUTROPHILS NFR BLD: 38.8 % (ref 38–73)
NRBC BLD-RTO: 0 /100 WBC
OVALOCYTES BLD QL SMEAR: ABNORMAL
PLATELET # BLD AUTO: 15 K/UL (ref 150–350)
PLATELET BLD QL SMEAR: ABNORMAL
PMV BLD AUTO: 10.5 FL (ref 9.2–12.9)
POIKILOCYTOSIS BLD QL SMEAR: SLIGHT
POTASSIUM SERPL-SCNC: 4.3 MMOL/L (ref 3.5–5.1)
PROT SERPL-MCNC: 6.4 G/DL (ref 6–8.4)
RBC # BLD AUTO: 2.37 M/UL (ref 4–5.4)
SODIUM SERPL-SCNC: 135 MMOL/L (ref 136–145)
UUN UR-MCNC: 24 MG/DL (ref 7–18)
WBC # BLD AUTO: 1.16 K/UL (ref 3.9–12.7)

## 2020-12-31 PROCEDURE — 80053 COMPREHEN METABOLIC PANEL: CPT

## 2020-12-31 PROCEDURE — 36415 COLL VENOUS BLD VENIPUNCTURE: CPT | Mod: PN

## 2020-12-31 PROCEDURE — 80053 COMPREHEN METABOLIC PANEL: CPT | Mod: PN

## 2020-12-31 PROCEDURE — 86900 BLOOD TYPING SEROLOGIC ABO: CPT

## 2020-12-31 PROCEDURE — 86900 BLOOD TYPING SEROLOGIC ABO: CPT | Mod: PN

## 2020-12-31 PROCEDURE — 85025 COMPLETE CBC W/AUTO DIFF WBC: CPT

## 2020-12-31 PROCEDURE — 85025 COMPLETE CBC W/AUTO DIFF WBC: CPT | Mod: PN

## 2021-01-04 ENCOUNTER — LAB VISIT (OUTPATIENT)
Dept: LAB | Facility: HOSPITAL | Age: 71
End: 2021-01-04
Attending: INTERNAL MEDICINE
Payer: MEDICARE

## 2021-01-04 DIAGNOSIS — C92.02 ACUTE MYELOID LEUKEMIA IN RELAPSE: Primary | ICD-10-CM

## 2021-01-04 DIAGNOSIS — D61.810 PANCYTOPENIA DUE TO CHEMOTHERAPY: ICD-10-CM

## 2021-01-04 DIAGNOSIS — C92.02 ACUTE MYELOID LEUKEMIA IN RELAPSE: ICD-10-CM

## 2021-01-04 LAB
ABO + RH BLD: NORMAL
ALBUMIN SERPL BCP-MCNC: 4 G/DL (ref 3.5–5.2)
ALP SERPL-CCNC: 81 U/L (ref 38–145)
ALT SERPL W/O P-5'-P-CCNC: 14 U/L (ref 0–35)
ANION GAP SERPL CALC-SCNC: 7 MMOL/L (ref 8–16)
ANISOCYTOSIS BLD QL SMEAR: SLIGHT
AST SERPL-CCNC: 21 U/L (ref 14–36)
BASOPHILS # BLD AUTO: 0 K/UL (ref 0–0.2)
BASOPHILS NFR BLD: 0 % (ref 0–1.9)
BILIRUB SERPL-MCNC: 0.6 MG/DL (ref 0.2–1.3)
BLD GP AB SCN CELLS X3 SERPL QL: NORMAL
CALCIUM SERPL-MCNC: 9.9 MG/DL (ref 8.4–10.2)
CHLORIDE SERPL-SCNC: 99 MMOL/L (ref 95–110)
CO2 SERPL-SCNC: 28 MMOL/L (ref 22–31)
CREAT SERPL-MCNC: 1.17 MG/DL (ref 0.5–1.4)
DIFFERENTIAL METHOD: ABNORMAL
EOSINOPHIL # BLD AUTO: 0 K/UL (ref 0–0.5)
EOSINOPHIL NFR BLD: 0 % (ref 0–8)
ERYTHROCYTE [DISTWIDTH] IN BLOOD BY AUTOMATED COUNT: 14.2 % (ref 11.5–14.5)
EST. GFR  (AFRICAN AMERICAN): 55 ML/MIN/1.73 M^2
EST. GFR  (NON AFRICAN AMERICAN): 48 ML/MIN/1.73 M^2
GLUCOSE SERPL-MCNC: 109 MG/DL (ref 70–110)
HCT VFR BLD AUTO: 20.9 % (ref 37–48.5)
HGB BLD-MCNC: 7.1 G/DL (ref 12–16)
HYPOCHROMIA BLD QL SMEAR: ABNORMAL
IMM GRANULOCYTES # BLD AUTO: 0.02 K/UL (ref 0–0.04)
IMM GRANULOCYTES NFR BLD AUTO: 1.7 % (ref 0–0.5)
LYMPHOCYTES # BLD AUTO: 0.8 K/UL (ref 1–4.8)
LYMPHOCYTES NFR BLD: 64.7 % (ref 18–48)
MCH RBC QN AUTO: 30 PG (ref 27–31)
MCHC RBC AUTO-ENTMCNC: 34 G/DL (ref 32–36)
MCV RBC AUTO: 88 FL (ref 82–98)
MONOCYTES # BLD AUTO: 0 K/UL (ref 0.3–1)
MONOCYTES NFR BLD: 3.4 % (ref 4–15)
NEUTROPHILS # BLD AUTO: 0.4 K/UL (ref 1.8–7.7)
NEUTROPHILS NFR BLD: 30.2 % (ref 38–73)
NRBC BLD-RTO: 3 /100 WBC
OVALOCYTES BLD QL SMEAR: ABNORMAL
PLATELET # BLD AUTO: 8 K/UL (ref 150–350)
PLATELET BLD QL SMEAR: ABNORMAL
PMV BLD AUTO: ABNORMAL FL (ref 9.2–12.9)
POIKILOCYTOSIS BLD QL SMEAR: SLIGHT
POLYCHROMASIA BLD QL SMEAR: ABNORMAL
POTASSIUM SERPL-SCNC: 3.9 MMOL/L (ref 3.5–5.1)
PROT SERPL-MCNC: 6.2 G/DL (ref 6–8.4)
RBC # BLD AUTO: 2.37 M/UL (ref 4–5.4)
SODIUM SERPL-SCNC: 134 MMOL/L (ref 136–145)
UUN UR-MCNC: 27 MG/DL (ref 7–18)
WBC # BLD AUTO: 1.16 K/UL (ref 3.9–12.7)

## 2021-01-04 PROCEDURE — 36415 COLL VENOUS BLD VENIPUNCTURE: CPT | Mod: PN

## 2021-01-04 PROCEDURE — 85025 COMPLETE CBC W/AUTO DIFF WBC: CPT

## 2021-01-04 PROCEDURE — 86900 BLOOD TYPING SEROLOGIC ABO: CPT

## 2021-01-04 PROCEDURE — 85025 COMPLETE CBC W/AUTO DIFF WBC: CPT | Mod: PN

## 2021-01-04 PROCEDURE — 80053 COMPREHEN METABOLIC PANEL: CPT

## 2021-01-04 PROCEDURE — 80053 COMPREHEN METABOLIC PANEL: CPT | Mod: PN

## 2021-01-04 PROCEDURE — 86900 BLOOD TYPING SEROLOGIC ABO: CPT | Mod: PN

## 2021-01-04 RX ORDER — ACETAMINOPHEN 325 MG/1
650 TABLET ORAL
Status: CANCELLED | OUTPATIENT
Start: 2021-01-04

## 2021-01-04 RX ORDER — PREDNISONE 20 MG/1
20 TABLET ORAL DAILY PRN
Qty: 30 TABLET | Refills: 3 | Status: ON HOLD | OUTPATIENT
Start: 2021-01-04 | End: 2021-02-10

## 2021-01-04 RX ORDER — HYDROCODONE BITARTRATE AND ACETAMINOPHEN 500; 5 MG/1; MG/1
TABLET ORAL ONCE
Status: CANCELLED | OUTPATIENT
Start: 2021-01-04 | End: 2021-01-04

## 2021-01-04 RX ORDER — DIPHENHYDRAMINE HCL 25 MG
25 CAPSULE ORAL
Status: CANCELLED | OUTPATIENT
Start: 2021-01-04

## 2021-01-07 ENCOUNTER — LAB VISIT (OUTPATIENT)
Dept: LAB | Facility: HOSPITAL | Age: 71
End: 2021-01-07
Attending: INTERNAL MEDICINE
Payer: MEDICARE

## 2021-01-07 DIAGNOSIS — C92.02 ACUTE MYELOID LEUKEMIA IN RELAPSE: ICD-10-CM

## 2021-01-07 DIAGNOSIS — D61.810 PANCYTOPENIA DUE TO CHEMOTHERAPY: ICD-10-CM

## 2021-01-07 LAB
ABO + RH BLD: NORMAL
ALBUMIN SERPL BCP-MCNC: 4.2 G/DL (ref 3.5–5.2)
ALP SERPL-CCNC: 82 U/L (ref 38–145)
ALT SERPL W/O P-5'-P-CCNC: 12 U/L (ref 0–35)
ANION GAP SERPL CALC-SCNC: 7 MMOL/L (ref 8–16)
ANISOCYTOSIS BLD QL SMEAR: SLIGHT
AST SERPL-CCNC: 20 U/L (ref 14–36)
BASOPHILS # BLD AUTO: 0 K/UL (ref 0–0.2)
BASOPHILS NFR BLD: 0 % (ref 0–1.9)
BILIRUB SERPL-MCNC: 0.6 MG/DL (ref 0.2–1.3)
BLD GP AB SCN CELLS X3 SERPL QL: NORMAL
CALCIUM SERPL-MCNC: 10.1 MG/DL (ref 8.4–10.2)
CHLORIDE SERPL-SCNC: 99 MMOL/L (ref 95–110)
CO2 SERPL-SCNC: 27 MMOL/L (ref 22–31)
CREAT SERPL-MCNC: 1.06 MG/DL (ref 0.5–1.4)
DIFFERENTIAL METHOD: ABNORMAL
EOSINOPHIL # BLD AUTO: 0 K/UL (ref 0–0.5)
EOSINOPHIL NFR BLD: 0 % (ref 0–8)
ERYTHROCYTE [DISTWIDTH] IN BLOOD BY AUTOMATED COUNT: 14.3 % (ref 11.5–14.5)
EST. GFR  (AFRICAN AMERICAN): >60 ML/MIN/1.73 M^2
EST. GFR  (NON AFRICAN AMERICAN): 54 ML/MIN/1.73 M^2
GLUCOSE SERPL-MCNC: 93 MG/DL (ref 70–110)
HCT VFR BLD AUTO: 25.9 % (ref 37–48.5)
HGB BLD-MCNC: 8.9 G/DL (ref 12–16)
HYPOCHROMIA BLD QL SMEAR: ABNORMAL
IMM GRANULOCYTES # BLD AUTO: 0 K/UL (ref 0–0.04)
IMM GRANULOCYTES NFR BLD AUTO: 0 % (ref 0–0.5)
LYMPHOCYTES # BLD AUTO: 0.8 K/UL (ref 1–4.8)
LYMPHOCYTES NFR BLD: 90.2 % (ref 18–48)
MCH RBC QN AUTO: 30.2 PG (ref 27–31)
MCHC RBC AUTO-ENTMCNC: 34.4 G/DL (ref 32–36)
MCV RBC AUTO: 88 FL (ref 82–98)
MONOCYTES # BLD AUTO: 0 K/UL (ref 0.3–1)
MONOCYTES NFR BLD: 1.1 % (ref 4–15)
NEUTROPHILS # BLD AUTO: 0.1 K/UL (ref 1.8–7.7)
NEUTROPHILS NFR BLD: 8.7 % (ref 38–73)
NRBC BLD-RTO: 7 /100 WBC
OVALOCYTES BLD QL SMEAR: ABNORMAL
PLATELET # BLD AUTO: 44 K/UL (ref 150–350)
PLATELET BLD QL SMEAR: ABNORMAL
PMV BLD AUTO: 9.4 FL (ref 9.2–12.9)
POLYCHROMASIA BLD QL SMEAR: ABNORMAL
POTASSIUM SERPL-SCNC: 4.1 MMOL/L (ref 3.5–5.1)
PROT SERPL-MCNC: 6.5 G/DL (ref 6–8.4)
RBC # BLD AUTO: 2.95 M/UL (ref 4–5.4)
SODIUM SERPL-SCNC: 133 MMOL/L (ref 136–145)
UUN UR-MCNC: 26 MG/DL (ref 7–18)
WBC # BLD AUTO: 0.92 K/UL (ref 3.9–12.7)

## 2021-01-07 PROCEDURE — 80053 COMPREHEN METABOLIC PANEL: CPT

## 2021-01-07 PROCEDURE — 85025 COMPLETE CBC W/AUTO DIFF WBC: CPT

## 2021-01-07 PROCEDURE — 85025 COMPLETE CBC W/AUTO DIFF WBC: CPT | Mod: PN

## 2021-01-07 PROCEDURE — 86900 BLOOD TYPING SEROLOGIC ABO: CPT

## 2021-01-07 PROCEDURE — 36415 COLL VENOUS BLD VENIPUNCTURE: CPT | Mod: PN

## 2021-01-07 PROCEDURE — 80053 COMPREHEN METABOLIC PANEL: CPT | Mod: PN

## 2021-01-07 PROCEDURE — 86900 BLOOD TYPING SEROLOGIC ABO: CPT | Mod: PN

## 2021-01-11 ENCOUNTER — LAB VISIT (OUTPATIENT)
Dept: LAB | Facility: HOSPITAL | Age: 71
End: 2021-01-11
Attending: OBSTETRICS & GYNECOLOGY
Payer: MEDICARE

## 2021-01-11 DIAGNOSIS — C92.02 ACUTE MYELOID LEUKEMIA IN RELAPSE: ICD-10-CM

## 2021-01-11 DIAGNOSIS — D61.810 PANCYTOPENIA DUE TO CHEMOTHERAPY: ICD-10-CM

## 2021-01-11 LAB
ABO + RH BLD: NORMAL
ALBUMIN SERPL BCP-MCNC: 4.2 G/DL (ref 3.5–5.2)
ALP SERPL-CCNC: 74 U/L (ref 38–145)
ALT SERPL W/O P-5'-P-CCNC: 14 U/L (ref 0–35)
ANION GAP SERPL CALC-SCNC: 8 MMOL/L (ref 8–16)
ANISOCYTOSIS BLD QL SMEAR: ABNORMAL
AST SERPL-CCNC: 19 U/L (ref 14–36)
BASOPHILS # BLD AUTO: 0 K/UL (ref 0–0.2)
BASOPHILS NFR BLD: 0 % (ref 0–1.9)
BILIRUB SERPL-MCNC: 0.6 MG/DL (ref 0.2–1.3)
BLD GP AB SCN CELLS X3 SERPL QL: NORMAL
CALCIUM SERPL-MCNC: 10.3 MG/DL (ref 8.4–10.2)
CHLORIDE SERPL-SCNC: 102 MMOL/L (ref 95–110)
CO2 SERPL-SCNC: 26 MMOL/L (ref 22–31)
CREAT SERPL-MCNC: 1.13 MG/DL (ref 0.5–1.4)
DACRYOCYTES BLD QL SMEAR: ABNORMAL
DIFFERENTIAL METHOD: ABNORMAL
EOSINOPHIL # BLD AUTO: 0 K/UL (ref 0–0.5)
EOSINOPHIL NFR BLD: 0 % (ref 0–8)
ERYTHROCYTE [DISTWIDTH] IN BLOOD BY AUTOMATED COUNT: 14.8 % (ref 11.5–14.5)
EST. GFR  (AFRICAN AMERICAN): 57 ML/MIN/1.73 M^2
EST. GFR  (NON AFRICAN AMERICAN): 50 ML/MIN/1.73 M^2
GLUCOSE SERPL-MCNC: 112 MG/DL (ref 70–110)
HCT VFR BLD AUTO: 26.3 % (ref 37–48.5)
HGB BLD-MCNC: 8.6 G/DL (ref 12–16)
HYPOCHROMIA BLD QL SMEAR: ABNORMAL
IMM GRANULOCYTES # BLD AUTO: 0 K/UL (ref 0–0.04)
IMM GRANULOCYTES NFR BLD AUTO: 0 % (ref 0–0.5)
LYMPHOCYTES # BLD AUTO: 0.8 K/UL (ref 1–4.8)
LYMPHOCYTES NFR BLD: 97.5 % (ref 18–48)
MCH RBC QN AUTO: 30.1 PG (ref 27–31)
MCHC RBC AUTO-ENTMCNC: 32.7 G/DL (ref 32–36)
MCV RBC AUTO: 92 FL (ref 82–98)
MONOCYTES # BLD AUTO: 0 K/UL (ref 0.3–1)
MONOCYTES NFR BLD: 1.3 % (ref 4–15)
NEUTROPHILS # BLD AUTO: 0 K/UL (ref 1.8–7.7)
NEUTROPHILS NFR BLD: 1.2 % (ref 38–73)
NRBC BLD-RTO: 5 /100 WBC
OVALOCYTES BLD QL SMEAR: ABNORMAL
PLATELET # BLD AUTO: 25 K/UL (ref 150–350)
PLATELET BLD QL SMEAR: ABNORMAL
PMV BLD AUTO: 11.9 FL (ref 9.2–12.9)
POTASSIUM SERPL-SCNC: 4.2 MMOL/L (ref 3.5–5.1)
PROT SERPL-MCNC: 6.5 G/DL (ref 6–8.4)
RBC # BLD AUTO: 2.86 M/UL (ref 4–5.4)
SODIUM SERPL-SCNC: 136 MMOL/L (ref 136–145)
UUN UR-MCNC: 27 MG/DL (ref 7–18)
WBC # BLD AUTO: 0.8 K/UL (ref 3.9–12.7)

## 2021-01-11 PROCEDURE — 36415 COLL VENOUS BLD VENIPUNCTURE: CPT | Mod: PN

## 2021-01-11 PROCEDURE — 86900 BLOOD TYPING SEROLOGIC ABO: CPT

## 2021-01-11 PROCEDURE — 85025 COMPLETE CBC W/AUTO DIFF WBC: CPT

## 2021-01-11 PROCEDURE — 80053 COMPREHEN METABOLIC PANEL: CPT

## 2021-01-11 PROCEDURE — 80053 COMPREHEN METABOLIC PANEL: CPT | Mod: PN

## 2021-01-11 PROCEDURE — 86900 BLOOD TYPING SEROLOGIC ABO: CPT | Mod: PN

## 2021-01-11 PROCEDURE — 85025 COMPLETE CBC W/AUTO DIFF WBC: CPT | Mod: PN

## 2021-01-14 ENCOUNTER — LAB VISIT (OUTPATIENT)
Dept: LAB | Facility: HOSPITAL | Age: 71
End: 2021-01-14
Attending: INTERNAL MEDICINE
Payer: MEDICARE

## 2021-01-14 DIAGNOSIS — D61.810 PANCYTOPENIA DUE TO CHEMOTHERAPY: ICD-10-CM

## 2021-01-14 DIAGNOSIS — C92.02 ACUTE MYELOID LEUKEMIA IN RELAPSE: ICD-10-CM

## 2021-01-14 LAB
ABO + RH BLD: NORMAL
ALBUMIN SERPL BCP-MCNC: 3.9 G/DL (ref 3.5–5.2)
ALP SERPL-CCNC: 83 U/L (ref 38–145)
ALT SERPL W/O P-5'-P-CCNC: 22 U/L (ref 0–35)
ANION GAP SERPL CALC-SCNC: 8 MMOL/L (ref 8–16)
ANISOCYTOSIS BLD QL SMEAR: ABNORMAL
AST SERPL-CCNC: 20 U/L (ref 14–36)
BASO STIPL BLD QL SMEAR: ABNORMAL
BASOPHILS # BLD AUTO: 0 K/UL (ref 0–0.2)
BASOPHILS NFR BLD: 0 % (ref 0–1.9)
BILIRUB SERPL-MCNC: 0.7 MG/DL (ref 0.2–1.3)
BLD GP AB SCN CELLS X3 SERPL QL: NORMAL
CALCIUM SERPL-MCNC: 9.9 MG/DL (ref 8.4–10.2)
CHLORIDE SERPL-SCNC: 101 MMOL/L (ref 95–110)
CO2 SERPL-SCNC: 27 MMOL/L (ref 22–31)
CREAT SERPL-MCNC: 1.15 MG/DL (ref 0.5–1.4)
DIFFERENTIAL METHOD: ABNORMAL
EOSINOPHIL # BLD AUTO: 0 K/UL (ref 0–0.5)
EOSINOPHIL NFR BLD: 0 % (ref 0–8)
ERYTHROCYTE [DISTWIDTH] IN BLOOD BY AUTOMATED COUNT: 15.9 % (ref 11.5–14.5)
EST. GFR  (AFRICAN AMERICAN): 56 ML/MIN/1.73 M^2
EST. GFR  (NON AFRICAN AMERICAN): 49 ML/MIN/1.73 M^2
GLUCOSE SERPL-MCNC: 114 MG/DL (ref 70–110)
HCT VFR BLD AUTO: 23.8 % (ref 37–48.5)
HGB BLD-MCNC: 8 G/DL (ref 12–16)
HYPOCHROMIA BLD QL SMEAR: ABNORMAL
IMM GRANULOCYTES # BLD AUTO: 0 K/UL (ref 0–0.04)
IMM GRANULOCYTES NFR BLD AUTO: 0 % (ref 0–0.5)
LYMPHOCYTES # BLD AUTO: 0.7 K/UL (ref 1–4.8)
LYMPHOCYTES NFR BLD: 93 % (ref 18–48)
MCH RBC QN AUTO: 30.8 PG (ref 27–31)
MCHC RBC AUTO-ENTMCNC: 33.6 G/DL (ref 32–36)
MCV RBC AUTO: 92 FL (ref 82–98)
MONOCYTES # BLD AUTO: 0 K/UL (ref 0.3–1)
MONOCYTES NFR BLD: 0 % (ref 4–15)
NEUTROPHILS # BLD AUTO: 0 K/UL (ref 1.8–7.7)
NEUTROPHILS NFR BLD: 7 % (ref 38–73)
NRBC BLD-RTO: 6 /100 WBC
PLATELET # BLD AUTO: 31 K/UL (ref 150–350)
PMV BLD AUTO: 9.6 FL (ref 9.2–12.9)
POLYCHROMASIA BLD QL SMEAR: ABNORMAL
POTASSIUM SERPL-SCNC: 4 MMOL/L (ref 3.5–5.1)
PROT SERPL-MCNC: 6.3 G/DL (ref 6–8.4)
RBC # BLD AUTO: 2.6 M/UL (ref 4–5.4)
SODIUM SERPL-SCNC: 136 MMOL/L (ref 136–145)
UUN UR-MCNC: 29 MG/DL (ref 7–18)
WBC # BLD AUTO: 0.71 K/UL (ref 3.9–12.7)

## 2021-01-14 PROCEDURE — 36415 COLL VENOUS BLD VENIPUNCTURE: CPT | Mod: PN

## 2021-01-14 PROCEDURE — 86900 BLOOD TYPING SEROLOGIC ABO: CPT

## 2021-01-14 PROCEDURE — 86900 BLOOD TYPING SEROLOGIC ABO: CPT | Mod: PN

## 2021-01-14 PROCEDURE — 85025 COMPLETE CBC W/AUTO DIFF WBC: CPT | Mod: PN

## 2021-01-14 PROCEDURE — 80053 COMPREHEN METABOLIC PANEL: CPT

## 2021-01-14 PROCEDURE — 85025 COMPLETE CBC W/AUTO DIFF WBC: CPT

## 2021-01-14 PROCEDURE — 80053 COMPREHEN METABOLIC PANEL: CPT | Mod: PN

## 2021-01-19 ENCOUNTER — INFUSION (OUTPATIENT)
Dept: INFUSION THERAPY | Facility: HOSPITAL | Age: 71
End: 2021-01-19
Payer: MEDICARE

## 2021-01-19 ENCOUNTER — LAB VISIT (OUTPATIENT)
Dept: LAB | Facility: HOSPITAL | Age: 71
End: 2021-01-19
Payer: MEDICARE

## 2021-01-19 ENCOUNTER — OFFICE VISIT (OUTPATIENT)
Dept: HEMATOLOGY/ONCOLOGY | Facility: CLINIC | Age: 71
End: 2021-01-19
Payer: MEDICARE

## 2021-01-19 VITALS
SYSTOLIC BLOOD PRESSURE: 102 MMHG | BODY MASS INDEX: 31.02 KG/M2 | DIASTOLIC BLOOD PRESSURE: 58 MMHG | OXYGEN SATURATION: 99 % | RESPIRATION RATE: 16 BRPM | WEIGHT: 175.06 LBS | HEART RATE: 69 BPM | HEIGHT: 63 IN

## 2021-01-19 VITALS
DIASTOLIC BLOOD PRESSURE: 59 MMHG | HEIGHT: 63 IN | SYSTOLIC BLOOD PRESSURE: 135 MMHG | HEART RATE: 66 BPM | TEMPERATURE: 98 F | OXYGEN SATURATION: 99 % | BODY MASS INDEX: 31.02 KG/M2 | RESPIRATION RATE: 16 BRPM | WEIGHT: 175.06 LBS

## 2021-01-19 DIAGNOSIS — C92.02 ACUTE MYELOID LEUKEMIA IN RELAPSE: Primary | ICD-10-CM

## 2021-01-19 DIAGNOSIS — C93.01 ACUTE MONOCYTIC LEUKEMIA IN REMISSION: ICD-10-CM

## 2021-01-19 DIAGNOSIS — S03.00XS DISLOCATION OF TEMPOROMANDIBULAR JOINT, SEQUELA: ICD-10-CM

## 2021-01-19 DIAGNOSIS — K21.9 LPRD (LARYNGOPHARYNGEAL REFLUX DISEASE): ICD-10-CM

## 2021-01-19 DIAGNOSIS — R63.0 LACK OF APPETITE: ICD-10-CM

## 2021-01-19 DIAGNOSIS — K59.03 DRUG-INDUCED CONSTIPATION: ICD-10-CM

## 2021-01-19 DIAGNOSIS — C92.02 ACUTE MYELOID LEUKEMIA IN RELAPSE: ICD-10-CM

## 2021-01-19 DIAGNOSIS — D61.810 PANCYTOPENIA DUE TO CHEMOTHERAPY: ICD-10-CM

## 2021-01-19 DIAGNOSIS — I42.7 CHEMOTHERAPY INDUCED CARDIOMYOPATHY: ICD-10-CM

## 2021-01-19 DIAGNOSIS — T45.1X5A CHEMOTHERAPY INDUCED CARDIOMYOPATHY: ICD-10-CM

## 2021-01-19 LAB
ABO + RH BLD: NORMAL
ALBUMIN SERPL BCP-MCNC: 3.2 G/DL (ref 3.5–5.2)
ALP SERPL-CCNC: 119 U/L (ref 55–135)
ALT SERPL W/O P-5'-P-CCNC: 24 U/L (ref 10–44)
ANION GAP SERPL CALC-SCNC: 9 MMOL/L (ref 8–16)
ANISOCYTOSIS BLD QL SMEAR: SLIGHT
AST SERPL-CCNC: 15 U/L (ref 10–40)
BASOPHILS # BLD AUTO: 0 K/UL (ref 0–0.2)
BASOPHILS NFR BLD: 0 % (ref 0–1.9)
BILIRUB SERPL-MCNC: 0.6 MG/DL (ref 0.1–1)
BLD GP AB SCN CELLS X3 SERPL QL: NORMAL
BUN SERPL-MCNC: 24 MG/DL (ref 8–23)
CALCIUM SERPL-MCNC: 8.8 MG/DL (ref 8.7–10.5)
CHLORIDE SERPL-SCNC: 105 MMOL/L (ref 95–110)
CO2 SERPL-SCNC: 24 MMOL/L (ref 23–29)
CREAT SERPL-MCNC: 1.1 MG/DL (ref 0.5–1.4)
DIFFERENTIAL METHOD: ABNORMAL
EOSINOPHIL # BLD AUTO: 0 K/UL (ref 0–0.5)
EOSINOPHIL NFR BLD: 0 % (ref 0–8)
ERYTHROCYTE [DISTWIDTH] IN BLOOD BY AUTOMATED COUNT: 15.4 % (ref 11.5–14.5)
EST. GFR  (AFRICAN AMERICAN): 59.2 ML/MIN/1.73 M^2
EST. GFR  (NON AFRICAN AMERICAN): 51.3 ML/MIN/1.73 M^2
GLUCOSE SERPL-MCNC: 104 MG/DL (ref 70–110)
HCT VFR BLD AUTO: 33.7 % (ref 37–48.5)
HGB BLD-MCNC: 11 G/DL (ref 12–16)
IMM GRANULOCYTES # BLD AUTO: 0 K/UL (ref 0–0.04)
IMM GRANULOCYTES NFR BLD AUTO: 0 % (ref 0–0.5)
LDH SERPL L TO P-CCNC: 158 U/L (ref 110–260)
LYMPHOCYTES # BLD AUTO: 0.6 K/UL (ref 1–4.8)
LYMPHOCYTES NFR BLD: 91.9 % (ref 18–48)
MCH RBC QN AUTO: 30.3 PG (ref 27–31)
MCHC RBC AUTO-ENTMCNC: 32.6 G/DL (ref 32–36)
MCV RBC AUTO: 93 FL (ref 82–98)
MONOCYTES # BLD AUTO: 0 K/UL (ref 0.3–1)
MONOCYTES NFR BLD: 1.6 % (ref 4–15)
NEUTROPHILS # BLD AUTO: 0 K/UL (ref 1.8–7.7)
NEUTROPHILS NFR BLD: 6.5 % (ref 38–73)
NRBC BLD-RTO: 3 /100 WBC
OVALOCYTES BLD QL SMEAR: ABNORMAL
PLATELET # BLD AUTO: 42 K/UL (ref 150–350)
PLATELET BLD QL SMEAR: ABNORMAL
PMV BLD AUTO: 12 FL (ref 9.2–12.9)
POIKILOCYTOSIS BLD QL SMEAR: SLIGHT
POTASSIUM SERPL-SCNC: 3.9 MMOL/L (ref 3.5–5.1)
PROT SERPL-MCNC: 5.9 G/DL (ref 6–8.4)
RBC # BLD AUTO: 3.63 M/UL (ref 4–5.4)
SODIUM SERPL-SCNC: 138 MMOL/L (ref 136–145)
TARGETS BLD QL SMEAR: ABNORMAL
URATE SERPL-MCNC: 4.3 MG/DL (ref 2.4–5.7)
WBC # BLD AUTO: 0.62 K/UL (ref 3.9–12.7)

## 2021-01-19 PROCEDURE — 99214 OFFICE O/P EST MOD 30 MIN: CPT | Mod: S$PBB,,, | Performed by: NURSE PRACTITIONER

## 2021-01-19 PROCEDURE — 80053 COMPREHEN METABOLIC PANEL: CPT

## 2021-01-19 PROCEDURE — 99999 PR PBB SHADOW E&M-EST. PATIENT-LVL IV: ICD-10-PCS | Mod: PBBFAC,,, | Performed by: NURSE PRACTITIONER

## 2021-01-19 PROCEDURE — 63600175 PHARM REV CODE 636 W HCPCS: Mod: JG | Performed by: INTERNAL MEDICINE

## 2021-01-19 PROCEDURE — 86900 BLOOD TYPING SEROLOGIC ABO: CPT

## 2021-01-19 PROCEDURE — 84550 ASSAY OF BLOOD/URIC ACID: CPT

## 2021-01-19 PROCEDURE — 99214 OFFICE O/P EST MOD 30 MIN: CPT | Mod: PBBFAC,25 | Performed by: NURSE PRACTITIONER

## 2021-01-19 PROCEDURE — 36415 COLL VENOUS BLD VENIPUNCTURE: CPT

## 2021-01-19 PROCEDURE — 85025 COMPLETE CBC W/AUTO DIFF WBC: CPT

## 2021-01-19 PROCEDURE — 83615 LACTATE (LD) (LDH) ENZYME: CPT

## 2021-01-19 PROCEDURE — 96413 CHEMO IV INFUSION 1 HR: CPT

## 2021-01-19 PROCEDURE — 99214 PR OFFICE/OUTPT VISIT, EST, LEVL IV, 30-39 MIN: ICD-10-PCS | Mod: S$PBB,,, | Performed by: NURSE PRACTITIONER

## 2021-01-19 PROCEDURE — A4216 STERILE WATER/SALINE, 10 ML: HCPCS | Performed by: INTERNAL MEDICINE

## 2021-01-19 PROCEDURE — 25000003 PHARM REV CODE 250: Performed by: INTERNAL MEDICINE

## 2021-01-19 PROCEDURE — 99999 PR PBB SHADOW E&M-EST. PATIENT-LVL IV: CPT | Mod: PBBFAC,,, | Performed by: NURSE PRACTITIONER

## 2021-01-19 RX ORDER — SODIUM CHLORIDE 0.9 % (FLUSH) 0.9 %
10 SYRINGE (ML) INJECTION
Status: CANCELLED | OUTPATIENT
Start: 2021-01-21

## 2021-01-19 RX ORDER — SODIUM CHLORIDE 0.9 % (FLUSH) 0.9 %
10 SYRINGE (ML) INJECTION
Status: CANCELLED | OUTPATIENT
Start: 2021-01-22

## 2021-01-19 RX ORDER — OMEPRAZOLE 40 MG/1
40 CAPSULE, DELAYED RELEASE ORAL
Qty: 30 CAPSULE | Refills: 2 | Status: SHIPPED | OUTPATIENT
Start: 2021-01-19 | End: 2021-03-16

## 2021-01-19 RX ORDER — SODIUM CHLORIDE 0.9 % (FLUSH) 0.9 %
10 SYRINGE (ML) INJECTION
Status: CANCELLED | OUTPATIENT
Start: 2021-01-23

## 2021-01-19 RX ORDER — HEPARIN 100 UNIT/ML
500 SYRINGE INTRAVENOUS
Status: CANCELLED | OUTPATIENT
Start: 2021-01-19

## 2021-01-19 RX ORDER — HEPARIN 100 UNIT/ML
500 SYRINGE INTRAVENOUS
Status: CANCELLED | OUTPATIENT
Start: 2021-01-22

## 2021-01-19 RX ORDER — HEPARIN 100 UNIT/ML
500 SYRINGE INTRAVENOUS
Status: CANCELLED | OUTPATIENT
Start: 2021-01-20

## 2021-01-19 RX ORDER — HEPARIN 100 UNIT/ML
500 SYRINGE INTRAVENOUS
Status: DISCONTINUED | OUTPATIENT
Start: 2021-01-19 | End: 2021-01-19 | Stop reason: HOSPADM

## 2021-01-19 RX ORDER — SODIUM CHLORIDE 0.9 % (FLUSH) 0.9 %
10 SYRINGE (ML) INJECTION
Status: CANCELLED | OUTPATIENT
Start: 2021-01-19

## 2021-01-19 RX ORDER — SODIUM CHLORIDE 0.9 % (FLUSH) 0.9 %
10 SYRINGE (ML) INJECTION
Status: CANCELLED | OUTPATIENT
Start: 2021-01-20

## 2021-01-19 RX ORDER — SODIUM CHLORIDE 0.9 % (FLUSH) 0.9 %
10 SYRINGE (ML) INJECTION
Status: DISCONTINUED | OUTPATIENT
Start: 2021-01-19 | End: 2021-01-19 | Stop reason: HOSPADM

## 2021-01-19 RX ORDER — HEPARIN 100 UNIT/ML
500 SYRINGE INTRAVENOUS
Status: CANCELLED | OUTPATIENT
Start: 2021-01-23

## 2021-01-19 RX ORDER — HEPARIN 100 UNIT/ML
500 SYRINGE INTRAVENOUS
Status: CANCELLED | OUTPATIENT
Start: 2021-01-21

## 2021-01-19 RX ADMIN — DECITABINE 40 MG: 50 INJECTION, POWDER, LYOPHILIZED, FOR SOLUTION INTRAVENOUS at 03:01

## 2021-01-19 RX ADMIN — SODIUM CHLORIDE: 0.9 INJECTION, SOLUTION INTRAVENOUS at 02:01

## 2021-01-19 RX ADMIN — HEPARIN 500 UNITS: 100 SYRINGE at 04:01

## 2021-01-19 RX ADMIN — Medication 10 ML: at 04:01

## 2021-01-20 ENCOUNTER — INFUSION (OUTPATIENT)
Dept: INFUSION THERAPY | Facility: HOSPITAL | Age: 71
End: 2021-01-20
Attending: INTERNAL MEDICINE
Payer: MEDICARE

## 2021-01-20 VITALS
TEMPERATURE: 97 F | WEIGHT: 175.06 LBS | RESPIRATION RATE: 16 BRPM | HEART RATE: 72 BPM | SYSTOLIC BLOOD PRESSURE: 125 MMHG | BODY MASS INDEX: 31.02 KG/M2 | DIASTOLIC BLOOD PRESSURE: 78 MMHG | HEIGHT: 63 IN

## 2021-01-20 DIAGNOSIS — C92.02 ACUTE MYELOID LEUKEMIA IN RELAPSE: Primary | ICD-10-CM

## 2021-01-20 DIAGNOSIS — C93.01 ACUTE MONOCYTIC LEUKEMIA IN REMISSION: ICD-10-CM

## 2021-01-20 PROCEDURE — 63600175 PHARM REV CODE 636 W HCPCS: Mod: PN | Performed by: INTERNAL MEDICINE

## 2021-01-20 PROCEDURE — 25000003 PHARM REV CODE 250: Mod: PN | Performed by: INTERNAL MEDICINE

## 2021-01-20 PROCEDURE — 96413 CHEMO IV INFUSION 1 HR: CPT | Mod: PN

## 2021-01-20 PROCEDURE — A4216 STERILE WATER/SALINE, 10 ML: HCPCS | Mod: PN | Performed by: INTERNAL MEDICINE

## 2021-01-20 RX ORDER — HEPARIN 100 UNIT/ML
500 SYRINGE INTRAVENOUS
Status: DISCONTINUED | OUTPATIENT
Start: 2021-01-20 | End: 2021-01-20 | Stop reason: HOSPADM

## 2021-01-20 RX ORDER — SODIUM CHLORIDE 0.9 % (FLUSH) 0.9 %
10 SYRINGE (ML) INJECTION
Status: DISCONTINUED | OUTPATIENT
Start: 2021-01-20 | End: 2021-01-20 | Stop reason: HOSPADM

## 2021-01-20 RX ADMIN — SODIUM CHLORIDE: 9 INJECTION, SOLUTION INTRAVENOUS at 02:01

## 2021-01-20 RX ADMIN — DECITABINE 40 MG: 50 INJECTION, POWDER, LYOPHILIZED, FOR SOLUTION INTRAVENOUS at 02:01

## 2021-01-20 RX ADMIN — SODIUM CHLORIDE, PRESERVATIVE FREE 500 UNITS: 5 INJECTION INTRAVENOUS at 04:01

## 2021-01-20 RX ADMIN — Medication 10 ML: at 02:01

## 2021-01-21 ENCOUNTER — DOCUMENTATION ONLY (OUTPATIENT)
Dept: INFUSION THERAPY | Facility: HOSPITAL | Age: 71
End: 2021-01-21

## 2021-01-21 ENCOUNTER — INFUSION (OUTPATIENT)
Dept: INFUSION THERAPY | Facility: HOSPITAL | Age: 71
End: 2021-01-21
Attending: INTERNAL MEDICINE
Payer: MEDICARE

## 2021-01-21 VITALS
TEMPERATURE: 98 F | WEIGHT: 175.06 LBS | DIASTOLIC BLOOD PRESSURE: 72 MMHG | HEIGHT: 63 IN | HEART RATE: 78 BPM | SYSTOLIC BLOOD PRESSURE: 113 MMHG | RESPIRATION RATE: 16 BRPM | BODY MASS INDEX: 31.02 KG/M2

## 2021-01-21 DIAGNOSIS — C93.01 ACUTE MONOCYTIC LEUKEMIA IN REMISSION: ICD-10-CM

## 2021-01-21 DIAGNOSIS — C92.02 ACUTE MYELOID LEUKEMIA IN RELAPSE: Primary | ICD-10-CM

## 2021-01-21 PROCEDURE — 63600175 PHARM REV CODE 636 W HCPCS: Mod: JG,PN | Performed by: INTERNAL MEDICINE

## 2021-01-21 PROCEDURE — 25000003 PHARM REV CODE 250: Mod: PN | Performed by: INTERNAL MEDICINE

## 2021-01-21 PROCEDURE — 96413 CHEMO IV INFUSION 1 HR: CPT | Mod: PN

## 2021-01-21 PROCEDURE — A4216 STERILE WATER/SALINE, 10 ML: HCPCS | Mod: PN | Performed by: INTERNAL MEDICINE

## 2021-01-21 RX ORDER — HEPARIN 100 UNIT/ML
500 SYRINGE INTRAVENOUS
Status: DISCONTINUED | OUTPATIENT
Start: 2021-01-21 | End: 2021-01-21 | Stop reason: HOSPADM

## 2021-01-21 RX ORDER — SODIUM CHLORIDE 0.9 % (FLUSH) 0.9 %
10 SYRINGE (ML) INJECTION
Status: DISCONTINUED | OUTPATIENT
Start: 2021-01-21 | End: 2021-01-21 | Stop reason: HOSPADM

## 2021-01-21 RX ADMIN — SODIUM CHLORIDE: 9 INJECTION, SOLUTION INTRAVENOUS at 02:01

## 2021-01-21 RX ADMIN — DECITABINE 40 MG: 50 INJECTION, POWDER, LYOPHILIZED, FOR SOLUTION INTRAVENOUS at 02:01

## 2021-01-21 RX ADMIN — Medication 10 ML: at 02:01

## 2021-01-22 ENCOUNTER — INFUSION (OUTPATIENT)
Dept: INFUSION THERAPY | Facility: HOSPITAL | Age: 71
End: 2021-01-22
Attending: INTERNAL MEDICINE
Payer: MEDICARE

## 2021-01-22 VITALS
DIASTOLIC BLOOD PRESSURE: 70 MMHG | HEIGHT: 63 IN | WEIGHT: 175.06 LBS | RESPIRATION RATE: 18 BRPM | HEART RATE: 72 BPM | BODY MASS INDEX: 31.02 KG/M2 | TEMPERATURE: 98 F | SYSTOLIC BLOOD PRESSURE: 117 MMHG

## 2021-01-22 DIAGNOSIS — C92.02 ACUTE MYELOID LEUKEMIA IN RELAPSE: Primary | ICD-10-CM

## 2021-01-22 DIAGNOSIS — C93.01 ACUTE MONOCYTIC LEUKEMIA IN REMISSION: ICD-10-CM

## 2021-01-22 PROCEDURE — 96413 CHEMO IV INFUSION 1 HR: CPT | Mod: PN

## 2021-01-22 PROCEDURE — A4216 STERILE WATER/SALINE, 10 ML: HCPCS | Mod: PN | Performed by: INTERNAL MEDICINE

## 2021-01-22 PROCEDURE — 25000003 PHARM REV CODE 250: Mod: PN | Performed by: INTERNAL MEDICINE

## 2021-01-22 PROCEDURE — 63600175 PHARM REV CODE 636 W HCPCS: Mod: JG,PN | Performed by: INTERNAL MEDICINE

## 2021-01-22 RX ORDER — SODIUM CHLORIDE 0.9 % (FLUSH) 0.9 %
10 SYRINGE (ML) INJECTION
Status: DISCONTINUED | OUTPATIENT
Start: 2021-01-22 | End: 2021-01-22 | Stop reason: HOSPADM

## 2021-01-22 RX ADMIN — DECITABINE 40 MG: 50 INJECTION, POWDER, LYOPHILIZED, FOR SOLUTION INTRAVENOUS at 02:01

## 2021-01-22 RX ADMIN — SODIUM CHLORIDE: 9 INJECTION, SOLUTION INTRAVENOUS at 02:01

## 2021-01-22 RX ADMIN — Medication 10 ML: at 03:01

## 2021-01-23 ENCOUNTER — INFUSION (OUTPATIENT)
Dept: INFUSION THERAPY | Facility: HOSPITAL | Age: 71
End: 2021-01-23
Attending: INTERNAL MEDICINE
Payer: MEDICARE

## 2021-01-23 VITALS
RESPIRATION RATE: 18 BRPM | SYSTOLIC BLOOD PRESSURE: 116 MMHG | TEMPERATURE: 99 F | HEART RATE: 67 BPM | DIASTOLIC BLOOD PRESSURE: 57 MMHG

## 2021-01-23 DIAGNOSIS — C92.02 ACUTE MYELOID LEUKEMIA IN RELAPSE: Primary | ICD-10-CM

## 2021-01-23 DIAGNOSIS — C93.01 ACUTE MONOCYTIC LEUKEMIA IN REMISSION: ICD-10-CM

## 2021-01-23 PROCEDURE — 25000003 PHARM REV CODE 250: Performed by: INTERNAL MEDICINE

## 2021-01-23 PROCEDURE — 96413 CHEMO IV INFUSION 1 HR: CPT

## 2021-01-23 PROCEDURE — 63600175 PHARM REV CODE 636 W HCPCS: Mod: JG | Performed by: INTERNAL MEDICINE

## 2021-01-23 RX ORDER — HEPARIN 100 UNIT/ML
500 SYRINGE INTRAVENOUS
Status: DISCONTINUED | OUTPATIENT
Start: 2021-01-23 | End: 2021-01-23 | Stop reason: HOSPADM

## 2021-01-23 RX ORDER — SODIUM CHLORIDE 0.9 % (FLUSH) 0.9 %
10 SYRINGE (ML) INJECTION
Status: DISCONTINUED | OUTPATIENT
Start: 2021-01-23 | End: 2021-01-23 | Stop reason: HOSPADM

## 2021-01-23 RX ADMIN — SODIUM CHLORIDE: 0.9 INJECTION, SOLUTION INTRAVENOUS at 11:01

## 2021-01-23 RX ADMIN — DECITABINE 40 MG: 50 INJECTION, POWDER, LYOPHILIZED, FOR SOLUTION INTRAVENOUS at 11:01

## 2021-01-25 ENCOUNTER — LAB VISIT (OUTPATIENT)
Dept: LAB | Facility: HOSPITAL | Age: 71
End: 2021-01-25
Attending: INTERNAL MEDICINE
Payer: MEDICARE

## 2021-01-25 DIAGNOSIS — D61.810 PANCYTOPENIA DUE TO CHEMOTHERAPY: ICD-10-CM

## 2021-01-25 DIAGNOSIS — C92.02 ACUTE MYELOID LEUKEMIA IN RELAPSE: ICD-10-CM

## 2021-01-25 LAB
ABO + RH BLD: NORMAL
ALBUMIN SERPL BCP-MCNC: 3.8 G/DL (ref 3.5–5.2)
ALP SERPL-CCNC: 87 U/L (ref 38–145)
ALT SERPL W/O P-5'-P-CCNC: 19 U/L (ref 0–35)
ANION GAP SERPL CALC-SCNC: 8 MMOL/L (ref 8–16)
ANISOCYTOSIS BLD QL SMEAR: SLIGHT
AST SERPL-CCNC: 22 U/L (ref 14–36)
BASOPHILS NFR BLD: 0 % (ref 0–1.9)
BILIRUB SERPL-MCNC: 0.6 MG/DL (ref 0.2–1.3)
BLD GP AB SCN CELLS X3 SERPL QL: NORMAL
CALCIUM SERPL-MCNC: 9.6 MG/DL (ref 8.4–10.2)
CHLORIDE SERPL-SCNC: 99 MMOL/L (ref 95–110)
CO2 SERPL-SCNC: 29 MMOL/L (ref 22–31)
CREAT SERPL-MCNC: 1.06 MG/DL (ref 0.5–1.4)
DIFFERENTIAL METHOD: ABNORMAL
EOSINOPHIL NFR BLD: 0 % (ref 0–8)
ERYTHROCYTE [DISTWIDTH] IN BLOOD BY AUTOMATED COUNT: 15.2 % (ref 11.5–14.5)
EST. GFR  (AFRICAN AMERICAN): >60 ML/MIN/1.73 M^2
EST. GFR  (NON AFRICAN AMERICAN): 54 ML/MIN/1.73 M^2
GLUCOSE SERPL-MCNC: 110 MG/DL (ref 70–110)
HCT VFR BLD AUTO: 35.1 % (ref 37–48.5)
HGB BLD-MCNC: 11.5 G/DL (ref 12–16)
IMM GRANULOCYTES # BLD AUTO: ABNORMAL K/UL (ref 0–0.04)
IMM GRANULOCYTES NFR BLD AUTO: ABNORMAL % (ref 0–0.5)
LYMPHOCYTES NFR BLD: 81 % (ref 18–48)
MCH RBC QN AUTO: 30.3 PG (ref 27–31)
MCHC RBC AUTO-ENTMCNC: 32.8 G/DL (ref 32–36)
MCV RBC AUTO: 93 FL (ref 82–98)
MONOCYTES NFR BLD: 2 % (ref 4–15)
NEUTROPHILS # BLD AUTO: 0.2 K/UL (ref 1.8–7.7)
NEUTROPHILS NFR BLD: 17 % (ref 38–73)
NRBC BLD-RTO: 0 /100 WBC
OVALOCYTES BLD QL SMEAR: ABNORMAL
PLATELET # BLD AUTO: 41 K/UL (ref 150–350)
PLATELET BLD QL SMEAR: ABNORMAL
PMV BLD AUTO: 11.6 FL (ref 9.2–12.9)
POIKILOCYTOSIS BLD QL SMEAR: SLIGHT
POTASSIUM SERPL-SCNC: 4.7 MMOL/L (ref 3.5–5.1)
PROT SERPL-MCNC: 6.1 G/DL (ref 6–8.4)
RBC # BLD AUTO: 3.79 M/UL (ref 4–5.4)
SODIUM SERPL-SCNC: 136 MMOL/L (ref 136–145)
TOXIC GRANULES BLD QL SMEAR: PRESENT
UUN UR-MCNC: 25 MG/DL (ref 7–18)
WBC # BLD AUTO: 1.04 K/UL (ref 3.9–12.7)

## 2021-01-25 PROCEDURE — 86900 BLOOD TYPING SEROLOGIC ABO: CPT

## 2021-01-25 PROCEDURE — 36415 COLL VENOUS BLD VENIPUNCTURE: CPT | Mod: PN

## 2021-01-25 PROCEDURE — 85027 COMPLETE CBC AUTOMATED: CPT

## 2021-01-25 PROCEDURE — 80053 COMPREHEN METABOLIC PANEL: CPT

## 2021-01-25 PROCEDURE — 86900 BLOOD TYPING SEROLOGIC ABO: CPT | Mod: PN

## 2021-01-25 PROCEDURE — 85007 BL SMEAR W/DIFF WBC COUNT: CPT

## 2021-01-25 PROCEDURE — 80053 COMPREHEN METABOLIC PANEL: CPT | Mod: PN

## 2021-01-25 PROCEDURE — 85007 BL SMEAR W/DIFF WBC COUNT: CPT | Mod: PN

## 2021-01-25 PROCEDURE — 85027 COMPLETE CBC AUTOMATED: CPT | Mod: PN

## 2021-01-26 ENCOUNTER — PATIENT MESSAGE (OUTPATIENT)
Dept: HEMATOLOGY/ONCOLOGY | Facility: CLINIC | Age: 71
End: 2021-01-26

## 2021-01-31 ENCOUNTER — ANESTHESIA EVENT (OUTPATIENT)
Dept: SURGERY | Facility: HOSPITAL | Age: 71
DRG: 521 | End: 2021-01-31
Payer: MEDICARE

## 2021-01-31 ENCOUNTER — HOSPITAL ENCOUNTER (INPATIENT)
Facility: HOSPITAL | Age: 71
LOS: 10 days | Discharge: NURSING, REHAB OR LONG-TERM CARE | DRG: 521 | End: 2021-02-10
Attending: ORTHOPAEDIC SURGERY | Admitting: ORTHOPAEDIC SURGERY
Payer: MEDICARE

## 2021-01-31 DIAGNOSIS — C92.02 ACUTE MYELOID LEUKEMIA IN RELAPSE: ICD-10-CM

## 2021-01-31 DIAGNOSIS — Z01.810 PREOP CARDIOVASCULAR EXAM: ICD-10-CM

## 2021-01-31 DIAGNOSIS — Z78.9 IMPAIRED MOBILITY AND ADLS: ICD-10-CM

## 2021-01-31 DIAGNOSIS — Z74.09 IMPAIRED MOBILITY AND ADLS: ICD-10-CM

## 2021-01-31 DIAGNOSIS — S72.001A CLOSED DISPLACED FRACTURE OF RIGHT FEMORAL NECK: ICD-10-CM

## 2021-01-31 DIAGNOSIS — S72.001A CLOSED RIGHT HIP FRACTURE: Primary | ICD-10-CM

## 2021-01-31 DIAGNOSIS — C93.01 ACUTE MONOCYTIC LEUKEMIA IN REMISSION: ICD-10-CM

## 2021-01-31 DIAGNOSIS — D61.810 PANCYTOPENIA DUE TO CHEMOTHERAPY: ICD-10-CM

## 2021-01-31 PROBLEM — Z86.79 HISTORY OF CARDIOMYOPATHY: Status: ACTIVE | Noted: 2021-01-31

## 2021-01-31 PROBLEM — N17.9 ACUTE KIDNEY INJURY: Status: ACTIVE | Noted: 2021-01-31

## 2021-01-31 LAB
ABO + RH BLD: NORMAL
ALBUMIN SERPL BCP-MCNC: 3.4 G/DL (ref 3.5–5.2)
ALP SERPL-CCNC: 99 U/L (ref 55–135)
ALT SERPL W/O P-5'-P-CCNC: 55 U/L (ref 10–44)
ANION GAP SERPL CALC-SCNC: 13 MMOL/L (ref 8–16)
AST SERPL-CCNC: 75 U/L (ref 10–40)
BASOPHILS # BLD AUTO: 0 K/UL (ref 0–0.2)
BASOPHILS NFR BLD: 0 % (ref 0–1.9)
BILIRUB SERPL-MCNC: 0.7 MG/DL (ref 0.1–1)
BILIRUB UR QL STRIP: NEGATIVE
BLD GP AB SCN CELLS X3 SERPL QL: NORMAL
BLD PROD TYP BPU: NORMAL
BLOOD UNIT EXPIRATION DATE: NORMAL
BLOOD UNIT TYPE CODE: 6200
BLOOD UNIT TYPE: NORMAL
BUN SERPL-MCNC: 32 MG/DL (ref 8–23)
CALCIUM SERPL-MCNC: 9.4 MG/DL (ref 8.7–10.5)
CHLORIDE SERPL-SCNC: 103 MMOL/L (ref 95–110)
CLARITY UR REFRACT.AUTO: ABNORMAL
CO2 SERPL-SCNC: 22 MMOL/L (ref 23–29)
CODING SYSTEM: NORMAL
COLOR UR AUTO: YELLOW
CREAT SERPL-MCNC: 1.4 MG/DL (ref 0.5–1.4)
DIFFERENTIAL METHOD: ABNORMAL
DISPENSE STATUS: NORMAL
EOSINOPHIL # BLD AUTO: 0 K/UL (ref 0–0.5)
EOSINOPHIL NFR BLD: 0 % (ref 0–8)
ERYTHROCYTE [DISTWIDTH] IN BLOOD BY AUTOMATED COUNT: 15.3 % (ref 11.5–14.5)
EST. GFR  (AFRICAN AMERICAN): 44.2 ML/MIN/1.73 M^2
EST. GFR  (NON AFRICAN AMERICAN): 38.4 ML/MIN/1.73 M^2
ESTIMATED AVG GLUCOSE: 126 MG/DL (ref 68–131)
GLUCOSE SERPL-MCNC: 135 MG/DL (ref 70–110)
GLUCOSE UR QL STRIP: NEGATIVE
HBA1C MFR BLD: 6 % (ref 4–5.6)
HCT VFR BLD AUTO: 32.6 % (ref 37–48.5)
HGB BLD-MCNC: 10.6 G/DL (ref 12–16)
HGB UR QL STRIP: NEGATIVE
IMM GRANULOCYTES # BLD AUTO: 0 K/UL (ref 0–0.04)
IMM GRANULOCYTES NFR BLD AUTO: 0 % (ref 0–0.5)
INR PPP: 0.9 (ref 0.8–1.2)
KETONES UR QL STRIP: NEGATIVE
LEUKOCYTE ESTERASE UR QL STRIP: NEGATIVE
LYMPHOCYTES # BLD AUTO: 0.4 K/UL (ref 1–4.8)
LYMPHOCYTES NFR BLD: 95.3 % (ref 18–48)
MAGNESIUM SERPL-MCNC: 1.9 MG/DL (ref 1.6–2.6)
MCH RBC QN AUTO: 30.7 PG (ref 27–31)
MCHC RBC AUTO-ENTMCNC: 32.5 G/DL (ref 32–36)
MCV RBC AUTO: 95 FL (ref 82–98)
MONOCYTES # BLD AUTO: 0 K/UL (ref 0.3–1)
MONOCYTES NFR BLD: 2.3 % (ref 4–15)
NEUTROPHILS # BLD AUTO: 0 K/UL (ref 1.8–7.7)
NEUTROPHILS NFR BLD: 2.4 % (ref 38–73)
NITRITE UR QL STRIP: NEGATIVE
NRBC BLD-RTO: 0 /100 WBC
NUM UNITS TRANS WBC-POOR PLATPHERESIS: NORMAL
PH UR STRIP: 6 [PH] (ref 5–8)
PHOSPHATE SERPL-MCNC: 8.3 MG/DL (ref 2.7–4.5)
PLATELET # BLD AUTO: 11 K/UL (ref 150–350)
PLATELET BLD QL SMEAR: ABNORMAL
PMV BLD AUTO: 9.6 FL (ref 9.2–12.9)
POTASSIUM SERPL-SCNC: 5.7 MMOL/L (ref 3.5–5.1)
PREALB SERPL-MCNC: 25 MG/DL (ref 20–43)
PROT SERPL-MCNC: 6.4 G/DL (ref 6–8.4)
PROT UR QL STRIP: NEGATIVE
PROTHROMBIN TIME: 10.3 SEC (ref 9–12.5)
RBC # BLD AUTO: 3.45 M/UL (ref 4–5.4)
SODIUM SERPL-SCNC: 138 MMOL/L (ref 136–145)
SP GR UR STRIP: 1.01 (ref 1–1.03)
TRANSFERRIN SERPL-MCNC: 177 MG/DL (ref 200–375)
URN SPEC COLLECT METH UR: ABNORMAL
WBC # BLD AUTO: 0.43 K/UL (ref 3.9–12.7)

## 2021-01-31 PROCEDURE — 36430 TRANSFUSION BLD/BLD COMPNT: CPT

## 2021-01-31 PROCEDURE — 93005 ELECTROCARDIOGRAM TRACING: CPT

## 2021-01-31 PROCEDURE — 25000003 PHARM REV CODE 250: Performed by: STUDENT IN AN ORGANIZED HEALTH CARE EDUCATION/TRAINING PROGRAM

## 2021-01-31 PROCEDURE — 84100 ASSAY OF PHOSPHORUS: CPT

## 2021-01-31 PROCEDURE — 85025 COMPLETE CBC W/AUTO DIFF WBC: CPT

## 2021-01-31 PROCEDURE — 87086 URINE CULTURE/COLONY COUNT: CPT

## 2021-01-31 PROCEDURE — 20600001 HC STEP DOWN PRIVATE ROOM

## 2021-01-31 PROCEDURE — P9037 PLATE PHERES LEUKOREDU IRRAD: HCPCS

## 2021-01-31 PROCEDURE — 83036 HEMOGLOBIN GLYCOSYLATED A1C: CPT

## 2021-01-31 PROCEDURE — 63600175 PHARM REV CODE 636 W HCPCS: Performed by: STUDENT IN AN ORGANIZED HEALTH CARE EDUCATION/TRAINING PROGRAM

## 2021-01-31 PROCEDURE — 93010 ELECTROCARDIOGRAM REPORT: CPT | Mod: ,,, | Performed by: INTERNAL MEDICINE

## 2021-01-31 PROCEDURE — 80053 COMPREHEN METABOLIC PANEL: CPT

## 2021-01-31 PROCEDURE — 63600175 PHARM REV CODE 636 W HCPCS: Performed by: INTERNAL MEDICINE

## 2021-01-31 PROCEDURE — 25000003 PHARM REV CODE 250: Performed by: ORTHOPAEDIC SURGERY

## 2021-01-31 PROCEDURE — 81003 URINALYSIS AUTO W/O SCOPE: CPT

## 2021-01-31 PROCEDURE — 84134 ASSAY OF PREALBUMIN: CPT

## 2021-01-31 PROCEDURE — 83735 ASSAY OF MAGNESIUM: CPT

## 2021-01-31 PROCEDURE — 87040 BLOOD CULTURE FOR BACTERIA: CPT

## 2021-01-31 PROCEDURE — 84466 ASSAY OF TRANSFERRIN: CPT

## 2021-01-31 PROCEDURE — 99223 1ST HOSP IP/OBS HIGH 75: CPT | Mod: ,,, | Performed by: INTERNAL MEDICINE

## 2021-01-31 PROCEDURE — 85610 PROTHROMBIN TIME: CPT

## 2021-01-31 PROCEDURE — 99223 PR INITIAL HOSPITAL CARE,LEVL III: ICD-10-PCS | Mod: ,,, | Performed by: INTERNAL MEDICINE

## 2021-01-31 PROCEDURE — 86920 COMPATIBILITY TEST SPIN: CPT

## 2021-01-31 PROCEDURE — 86900 BLOOD TYPING SEROLOGIC ABO: CPT

## 2021-01-31 PROCEDURE — 25000003 PHARM REV CODE 250: Performed by: INTERNAL MEDICINE

## 2021-01-31 PROCEDURE — 93010 EKG 12-LEAD: ICD-10-PCS | Mod: ,,, | Performed by: INTERNAL MEDICINE

## 2021-01-31 RX ORDER — GLUCAGON 1 MG
1 KIT INJECTION
Status: DISCONTINUED | OUTPATIENT
Start: 2021-01-31 | End: 2021-02-04

## 2021-01-31 RX ORDER — IBUPROFEN 200 MG
16 TABLET ORAL
Status: DISCONTINUED | OUTPATIENT
Start: 2021-01-31 | End: 2021-02-04

## 2021-01-31 RX ORDER — METHOCARBAMOL 500 MG/1
500 TABLET, FILM COATED ORAL 4 TIMES DAILY PRN
Status: DISCONTINUED | OUTPATIENT
Start: 2021-01-31 | End: 2021-02-10 | Stop reason: HOSPADM

## 2021-01-31 RX ORDER — PROCHLORPERAZINE EDISYLATE 5 MG/ML
2.5 INJECTION INTRAMUSCULAR; INTRAVENOUS ONCE AS NEEDED
Status: COMPLETED | OUTPATIENT
Start: 2021-01-31 | End: 2021-01-31

## 2021-01-31 RX ORDER — HYDROMORPHONE HYDROCHLORIDE 1 MG/ML
0.5 INJECTION, SOLUTION INTRAMUSCULAR; INTRAVENOUS; SUBCUTANEOUS
Status: DISCONTINUED | OUTPATIENT
Start: 2021-01-31 | End: 2021-01-31

## 2021-01-31 RX ORDER — FAMOTIDINE 20 MG/1
20 TABLET, FILM COATED ORAL DAILY
Status: DISCONTINUED | OUTPATIENT
Start: 2021-01-31 | End: 2021-02-10 | Stop reason: HOSPADM

## 2021-01-31 RX ORDER — SODIUM CHLORIDE 9 MG/ML
INJECTION, SOLUTION INTRAVENOUS CONTINUOUS
Status: ACTIVE | OUTPATIENT
Start: 2021-01-31 | End: 2021-02-01

## 2021-01-31 RX ORDER — HYDROCODONE BITARTRATE AND ACETAMINOPHEN 500; 5 MG/1; MG/1
TABLET ORAL
Status: DISCONTINUED | OUTPATIENT
Start: 2021-01-31 | End: 2021-02-01

## 2021-01-31 RX ORDER — ACETAMINOPHEN 325 MG/1
650 TABLET ORAL EVERY 6 HOURS PRN
Status: DISCONTINUED | OUTPATIENT
Start: 2021-01-31 | End: 2021-02-01

## 2021-01-31 RX ORDER — SODIUM CHLORIDE 0.9 % (FLUSH) 0.9 %
10 SYRINGE (ML) INJECTION
Status: DISCONTINUED | OUTPATIENT
Start: 2021-01-31 | End: 2021-02-10 | Stop reason: HOSPADM

## 2021-01-31 RX ORDER — HYDROMORPHONE HYDROCHLORIDE 1 MG/ML
0.2 INJECTION, SOLUTION INTRAMUSCULAR; INTRAVENOUS; SUBCUTANEOUS
Status: DISCONTINUED | OUTPATIENT
Start: 2021-01-31 | End: 2021-02-01

## 2021-01-31 RX ORDER — ONDANSETRON 2 MG/ML
8 INJECTION INTRAMUSCULAR; INTRAVENOUS EVERY 8 HOURS PRN
Status: DISCONTINUED | OUTPATIENT
Start: 2021-01-31 | End: 2021-02-10 | Stop reason: HOSPADM

## 2021-01-31 RX ORDER — ESCITALOPRAM OXALATE 20 MG/1
20 TABLET ORAL NIGHTLY
Status: DISCONTINUED | OUTPATIENT
Start: 2021-01-31 | End: 2021-02-10 | Stop reason: HOSPADM

## 2021-01-31 RX ORDER — DIPHENHYDRAMINE HCL 25 MG
25 CAPSULE ORAL EVERY 6 HOURS PRN
Status: DISCONTINUED | OUTPATIENT
Start: 2021-01-31 | End: 2021-02-04

## 2021-01-31 RX ORDER — NALOXONE HCL 0.4 MG/ML
0.4 VIAL (ML) INJECTION
Status: DISCONTINUED | OUTPATIENT
Start: 2021-01-31 | End: 2021-02-10 | Stop reason: HOSPADM

## 2021-01-31 RX ORDER — MUPIROCIN 20 MG/G
OINTMENT TOPICAL 2 TIMES DAILY
Status: DISPENSED | OUTPATIENT
Start: 2021-01-31 | End: 2021-02-05

## 2021-01-31 RX ORDER — AMOXICILLIN 250 MG
1 CAPSULE ORAL DAILY
Status: DISCONTINUED | OUTPATIENT
Start: 2021-01-31 | End: 2021-02-10 | Stop reason: HOSPADM

## 2021-01-31 RX ORDER — HYDROCODONE BITARTRATE AND ACETAMINOPHEN 500; 5 MG/1; MG/1
TABLET ORAL
Status: DISCONTINUED | OUTPATIENT
Start: 2021-01-31 | End: 2021-01-31

## 2021-01-31 RX ORDER — ACYCLOVIR 200 MG/1
400 CAPSULE ORAL 2 TIMES DAILY
Status: DISCONTINUED | OUTPATIENT
Start: 2021-01-31 | End: 2021-02-10 | Stop reason: HOSPADM

## 2021-01-31 RX ORDER — FLUCONAZOLE 200 MG/1
400 TABLET ORAL DAILY
Status: DISCONTINUED | OUTPATIENT
Start: 2021-01-31 | End: 2021-02-10 | Stop reason: HOSPADM

## 2021-01-31 RX ORDER — METHOCARBAMOL 500 MG/1
500 TABLET, FILM COATED ORAL ONCE
Status: COMPLETED | OUTPATIENT
Start: 2021-01-31 | End: 2021-01-31

## 2021-01-31 RX ORDER — LEVOFLOXACIN 500 MG/1
500 TABLET, FILM COATED ORAL DAILY
Status: DISCONTINUED | OUTPATIENT
Start: 2021-01-31 | End: 2021-02-10 | Stop reason: HOSPADM

## 2021-01-31 RX ORDER — SODIUM CHLORIDE 0.9 % (FLUSH) 0.9 %
10 SYRINGE (ML) INJECTION
Status: DISCONTINUED | OUTPATIENT
Start: 2021-01-31 | End: 2021-02-01

## 2021-01-31 RX ORDER — OXYCODONE HYDROCHLORIDE 5 MG/1
10 TABLET ORAL EVERY 6 HOURS PRN
Status: DISCONTINUED | OUTPATIENT
Start: 2021-01-31 | End: 2021-02-01

## 2021-01-31 RX ORDER — METOPROLOL SUCCINATE 25 MG/1
25 TABLET, EXTENDED RELEASE ORAL DAILY
Status: DISCONTINUED | OUTPATIENT
Start: 2021-01-31 | End: 2021-02-10 | Stop reason: HOSPADM

## 2021-01-31 RX ORDER — IBUPROFEN 200 MG
24 TABLET ORAL
Status: DISCONTINUED | OUTPATIENT
Start: 2021-01-31 | End: 2021-02-04

## 2021-01-31 RX ADMIN — SODIUM CHLORIDE: 0.9 INJECTION, SOLUTION INTRAVENOUS at 05:01

## 2021-01-31 RX ADMIN — OXYCODONE HYDROCHLORIDE 10 MG: 10 TABLET ORAL at 01:01

## 2021-01-31 RX ADMIN — MUPIROCIN: 20 OINTMENT TOPICAL at 08:01

## 2021-01-31 RX ADMIN — ESCITALOPRAM OXALATE 20 MG: 20 TABLET ORAL at 08:01

## 2021-01-31 RX ADMIN — HYDROMORPHONE HYDROCHLORIDE 0.2 MG: 1 INJECTION, SOLUTION INTRAMUSCULAR; INTRAVENOUS; SUBCUTANEOUS at 05:01

## 2021-01-31 RX ADMIN — METOPROLOL SUCCINATE 25 MG: 25 TABLET, EXTENDED RELEASE ORAL at 01:01

## 2021-01-31 RX ADMIN — HYDROMORPHONE HYDROCHLORIDE 0.2 MG: 1 INJECTION, SOLUTION INTRAMUSCULAR; INTRAVENOUS; SUBCUTANEOUS at 02:01

## 2021-01-31 RX ADMIN — PROCHLORPERAZINE EDISYLATE 2.5 MG: 5 INJECTION INTRAMUSCULAR; INTRAVENOUS at 08:01

## 2021-01-31 RX ADMIN — FLUCONAZOLE 400 MG: 200 TABLET ORAL at 01:01

## 2021-01-31 RX ADMIN — ACETAMINOPHEN 650 MG: 325 TABLET ORAL at 05:01

## 2021-01-31 RX ADMIN — METHOCARBAMOL 500 MG: 500 TABLET ORAL at 04:01

## 2021-01-31 RX ADMIN — LEVOFLOXACIN 500 MG: 500 TABLET, FILM COATED ORAL at 01:01

## 2021-01-31 RX ADMIN — ACYCLOVIR 400 MG: 200 CAPSULE ORAL at 08:01

## 2021-01-31 RX ADMIN — METHOCARBAMOL 500 MG: 500 TABLET ORAL at 08:01

## 2021-01-31 RX ADMIN — DIPHENHYDRAMINE HYDROCHLORIDE 25 MG: 25 CAPSULE ORAL at 05:01

## 2021-01-31 RX ADMIN — DOCUSATE SODIUM 50MG AND SENNOSIDES 8.6MG 1 TABLET: 8.6; 5 TABLET, FILM COATED ORAL at 04:01

## 2021-01-31 RX ADMIN — FAMOTIDINE 20 MG: 20 TABLET, FILM COATED ORAL at 01:01

## 2021-01-31 RX ADMIN — ONDANSETRON 8 MG: 2 INJECTION INTRAMUSCULAR; INTRAVENOUS at 04:01

## 2021-01-31 RX ADMIN — OXYCODONE HYDROCHLORIDE 10 MG: 10 TABLET ORAL at 11:01

## 2021-02-01 ENCOUNTER — ANESTHESIA (OUTPATIENT)
Dept: SURGERY | Facility: HOSPITAL | Age: 71
DRG: 521 | End: 2021-02-01
Payer: MEDICARE

## 2021-02-01 LAB
ALBUMIN SERPL BCP-MCNC: 3.1 G/DL (ref 3.5–5.2)
ALP SERPL-CCNC: 120 U/L (ref 55–135)
ALT SERPL W/O P-5'-P-CCNC: 67 U/L (ref 10–44)
ANION GAP SERPL CALC-SCNC: 9 MMOL/L (ref 8–16)
ANISOCYTOSIS BLD QL SMEAR: SLIGHT
AST SERPL-CCNC: 78 U/L (ref 10–40)
BACTERIA UR CULT: NO GROWTH
BASOPHILS # BLD AUTO: 0 K/UL (ref 0–0.2)
BASOPHILS NFR BLD: 0 % (ref 0–1.9)
BILIRUB SERPL-MCNC: 1 MG/DL (ref 0.1–1)
BLD PROD TYP BPU: NORMAL
BLOOD UNIT EXPIRATION DATE: NORMAL
BLOOD UNIT TYPE CODE: 5100
BLOOD UNIT TYPE: NORMAL
BUN SERPL-MCNC: 29 MG/DL (ref 8–23)
CALCIUM SERPL-MCNC: 8.8 MG/DL (ref 8.7–10.5)
CHLORIDE SERPL-SCNC: 104 MMOL/L (ref 95–110)
CO2 SERPL-SCNC: 22 MMOL/L (ref 23–29)
CODING SYSTEM: NORMAL
CREAT SERPL-MCNC: 1 MG/DL (ref 0.5–1.4)
DACRYOCYTES BLD QL SMEAR: ABNORMAL
DIFFERENTIAL METHOD: ABNORMAL
DISPENSE STATUS: NORMAL
EOSINOPHIL # BLD AUTO: 0 K/UL (ref 0–0.5)
EOSINOPHIL NFR BLD: 0 % (ref 0–8)
ERYTHROCYTE [DISTWIDTH] IN BLOOD BY AUTOMATED COUNT: 14.5 % (ref 11.5–14.5)
EST. GFR  (AFRICAN AMERICAN): >60 ML/MIN/1.73 M^2
EST. GFR  (NON AFRICAN AMERICAN): 57.6 ML/MIN/1.73 M^2
GLUCOSE SERPL-MCNC: 103 MG/DL (ref 70–110)
HCT VFR BLD AUTO: 27.3 % (ref 37–48.5)
HGB BLD-MCNC: 8.8 G/DL (ref 12–16)
IMM GRANULOCYTES # BLD AUTO: 0 K/UL (ref 0–0.04)
IMM GRANULOCYTES NFR BLD AUTO: 0 % (ref 0–0.5)
LYMPHOCYTES # BLD AUTO: 0.4 K/UL (ref 1–4.8)
LYMPHOCYTES NFR BLD: 93.5 % (ref 18–48)
MAGNESIUM SERPL-MCNC: 1.8 MG/DL (ref 1.6–2.6)
MCH RBC QN AUTO: 29.9 PG (ref 27–31)
MCHC RBC AUTO-ENTMCNC: 32.2 G/DL (ref 32–36)
MCV RBC AUTO: 93 FL (ref 82–98)
MONOCYTES # BLD AUTO: 0 K/UL (ref 0.3–1)
MONOCYTES NFR BLD: 2.2 % (ref 4–15)
NEUTROPHILS # BLD AUTO: 0 K/UL (ref 1.8–7.7)
NEUTROPHILS NFR BLD: 4.3 % (ref 38–73)
NRBC BLD-RTO: 0 /100 WBC
NUM UNITS TRANS WBC-POOR PLATPHERESIS: NORMAL
OVALOCYTES BLD QL SMEAR: ABNORMAL
PHOSPHATE SERPL-MCNC: 4.3 MG/DL (ref 2.7–4.5)
PLATELET # BLD AUTO: 43 K/UL (ref 150–350)
PLATELET BLD QL SMEAR: ABNORMAL
PMV BLD AUTO: 10.2 FL (ref 9.2–12.9)
POIKILOCYTOSIS BLD QL SMEAR: SLIGHT
POTASSIUM SERPL-SCNC: 4.6 MMOL/L (ref 3.5–5.1)
PROT SERPL-MCNC: 5.7 G/DL (ref 6–8.4)
RBC # BLD AUTO: 2.94 M/UL (ref 4–5.4)
SARS-COV-2 RDRP RESP QL NAA+PROBE: NEGATIVE
SODIUM SERPL-SCNC: 135 MMOL/L (ref 136–145)
WBC # BLD AUTO: 0.46 K/UL (ref 3.9–12.7)

## 2021-02-01 PROCEDURE — D9220A PRA ANESTHESIA: ICD-10-PCS | Mod: CRNA,,, | Performed by: NURSE ANESTHETIST, CERTIFIED REGISTERED

## 2021-02-01 PROCEDURE — 25000003 PHARM REV CODE 250: Performed by: INTERNAL MEDICINE

## 2021-02-01 PROCEDURE — 63600175 PHARM REV CODE 636 W HCPCS: Performed by: STUDENT IN AN ORGANIZED HEALTH CARE EDUCATION/TRAINING PROGRAM

## 2021-02-01 PROCEDURE — 99233 PR SUBSEQUENT HOSPITAL CARE,LEVL III: ICD-10-PCS | Mod: ,,, | Performed by: INTERNAL MEDICINE

## 2021-02-01 PROCEDURE — 25000003 PHARM REV CODE 250: Performed by: ORTHOPAEDIC SURGERY

## 2021-02-01 PROCEDURE — 27236 TREAT THIGH FRACTURE: CPT | Mod: RT,GC,, | Performed by: ORTHOPAEDIC SURGERY

## 2021-02-01 PROCEDURE — 63600175 PHARM REV CODE 636 W HCPCS: Performed by: ORTHOPAEDIC SURGERY

## 2021-02-01 PROCEDURE — 94761 N-INVAS EAR/PLS OXIMETRY MLT: CPT

## 2021-02-01 PROCEDURE — 99233 SBSQ HOSP IP/OBS HIGH 50: CPT | Mod: ,,, | Performed by: INTERNAL MEDICINE

## 2021-02-01 PROCEDURE — 25000003 PHARM REV CODE 250: Performed by: STUDENT IN AN ORGANIZED HEALTH CARE EDUCATION/TRAINING PROGRAM

## 2021-02-01 PROCEDURE — 64448 NJX AA&/STRD FEM NRV NFS IMG: CPT | Performed by: STUDENT IN AN ORGANIZED HEALTH CARE EDUCATION/TRAINING PROGRAM

## 2021-02-01 PROCEDURE — 88305 TISSUE EXAM BY PATHOLOGIST: CPT | Performed by: PATHOLOGY

## 2021-02-01 PROCEDURE — 63600175 PHARM REV CODE 636 W HCPCS: Performed by: INTERNAL MEDICINE

## 2021-02-01 PROCEDURE — 71000033 HC RECOVERY, INTIAL HOUR: Performed by: ORTHOPAEDIC SURGERY

## 2021-02-01 PROCEDURE — U0002 COVID-19 LAB TEST NON-CDC: HCPCS

## 2021-02-01 PROCEDURE — 37000008 HC ANESTHESIA 1ST 15 MINUTES: Performed by: ORTHOPAEDIC SURGERY

## 2021-02-01 PROCEDURE — 64999 SUPRAINGUINAL FASCIA ILIACA CATHETER: ICD-10-PCS | Mod: ,,, | Performed by: ANESTHESIOLOGY

## 2021-02-01 PROCEDURE — 27236 PR FEMORAL FX, OPEN TX: ICD-10-PCS | Mod: RT,GC,, | Performed by: ORTHOPAEDIC SURGERY

## 2021-02-01 PROCEDURE — 27201423 OPTIME MED/SURG SUP & DEVICES STERILE SUPPLY: Performed by: ORTHOPAEDIC SURGERY

## 2021-02-01 PROCEDURE — 36415 COLL VENOUS BLD VENIPUNCTURE: CPT

## 2021-02-01 PROCEDURE — 88311 DECALCIFY TISSUE: CPT | Performed by: PATHOLOGY

## 2021-02-01 PROCEDURE — 88305 TISSUE EXAM BY PATHOLOGIST: ICD-10-PCS | Mod: 26,,, | Performed by: PATHOLOGY

## 2021-02-01 PROCEDURE — 88311 DECALCIFY TISSUE: CPT | Mod: 26,,, | Performed by: PATHOLOGY

## 2021-02-01 PROCEDURE — D9220A PRA ANESTHESIA: Mod: ANES,,, | Performed by: ANESTHESIOLOGY

## 2021-02-01 PROCEDURE — P9037 PLATE PHERES LEUKOREDU IRRAD: HCPCS

## 2021-02-01 PROCEDURE — 25000003 PHARM REV CODE 250: Performed by: NURSE ANESTHETIST, CERTIFIED REGISTERED

## 2021-02-01 PROCEDURE — 64999 UNLISTED PX NERVOUS SYSTEM: CPT | Mod: ,,, | Performed by: ANESTHESIOLOGY

## 2021-02-01 PROCEDURE — 25000003 PHARM REV CODE 250: Performed by: ANESTHESIOLOGY

## 2021-02-01 PROCEDURE — 63600175 PHARM REV CODE 636 W HCPCS: Performed by: NURSE ANESTHETIST, CERTIFIED REGISTERED

## 2021-02-01 PROCEDURE — 71000015 HC POSTOP RECOV 1ST HR: Performed by: ORTHOPAEDIC SURGERY

## 2021-02-01 PROCEDURE — 83735 ASSAY OF MAGNESIUM: CPT

## 2021-02-01 PROCEDURE — 51702 INSERT TEMP BLADDER CATH: CPT

## 2021-02-01 PROCEDURE — 84100 ASSAY OF PHOSPHORUS: CPT

## 2021-02-01 PROCEDURE — 76942 ECHO GUIDE FOR BIOPSY: CPT | Mod: 26,,, | Performed by: ANESTHESIOLOGY

## 2021-02-01 PROCEDURE — 71000039 HC RECOVERY, EACH ADD'L HOUR: Performed by: ORTHOPAEDIC SURGERY

## 2021-02-01 PROCEDURE — 36000710: Performed by: ORTHOPAEDIC SURGERY

## 2021-02-01 PROCEDURE — 80053 COMPREHEN METABOLIC PANEL: CPT

## 2021-02-01 PROCEDURE — 37000009 HC ANESTHESIA EA ADD 15 MINS: Performed by: ORTHOPAEDIC SURGERY

## 2021-02-01 PROCEDURE — 99900035 HC TECH TIME PER 15 MIN (STAT)

## 2021-02-01 PROCEDURE — D9220A PRA ANESTHESIA: ICD-10-PCS | Mod: ANES,,, | Performed by: ANESTHESIOLOGY

## 2021-02-01 PROCEDURE — 94799 UNLISTED PULMONARY SVC/PX: CPT

## 2021-02-01 PROCEDURE — 99223 1ST HOSP IP/OBS HIGH 75: CPT | Mod: 57,GC,, | Performed by: ORTHOPAEDIC SURGERY

## 2021-02-01 PROCEDURE — 20600001 HC STEP DOWN PRIVATE ROOM

## 2021-02-01 PROCEDURE — 88311 PR  DECALCIFY TISSUE: ICD-10-PCS | Mod: 26,,, | Performed by: PATHOLOGY

## 2021-02-01 PROCEDURE — D9220A PRA ANESTHESIA: Mod: CRNA,,, | Performed by: NURSE ANESTHETIST, CERTIFIED REGISTERED

## 2021-02-01 PROCEDURE — 76942 ECHO GUIDE FOR BIOPSY: CPT | Performed by: STUDENT IN AN ORGANIZED HEALTH CARE EDUCATION/TRAINING PROGRAM

## 2021-02-01 PROCEDURE — 76942 SUPRAINGUINAL FASCIA ILIACA CATHETER: ICD-10-PCS | Mod: 26,,, | Performed by: ANESTHESIOLOGY

## 2021-02-01 PROCEDURE — C1776 JOINT DEVICE (IMPLANTABLE): HCPCS | Performed by: ORTHOPAEDIC SURGERY

## 2021-02-01 PROCEDURE — 99223 PR INITIAL HOSPITAL CARE,LEVL III: ICD-10-PCS | Mod: 57,GC,, | Performed by: ORTHOPAEDIC SURGERY

## 2021-02-01 PROCEDURE — 88305 TISSUE EXAM BY PATHOLOGIST: CPT | Mod: 26,,, | Performed by: PATHOLOGY

## 2021-02-01 PROCEDURE — 36000711: Performed by: ORTHOPAEDIC SURGERY

## 2021-02-01 PROCEDURE — 85025 COMPLETE CBC W/AUTO DIFF WBC: CPT

## 2021-02-01 DEVICE — SLEEVE FEMORAL UNITRAX +10MM: Type: IMPLANTABLE DEVICE | Site: HIP | Status: FUNCTIONAL

## 2021-02-01 DEVICE — HEAD FEM ENDO UNI MOD 45MM: Type: IMPLANTABLE DEVICE | Site: HIP | Status: FUNCTIONAL

## 2021-02-01 DEVICE — STEM FEM SECURFIT 132DEG SZ 8: Type: IMPLANTABLE DEVICE | Site: HIP | Status: FUNCTIONAL

## 2021-02-01 RX ORDER — MUPIROCIN 20 MG/G
OINTMENT TOPICAL
Status: DISCONTINUED | OUTPATIENT
Start: 2021-02-01 | End: 2021-02-01 | Stop reason: HOSPADM

## 2021-02-01 RX ORDER — ENOXAPARIN SODIUM 100 MG/ML
40 INJECTION SUBCUTANEOUS EVERY 24 HOURS
Status: DISCONTINUED | OUTPATIENT
Start: 2021-02-02 | End: 2021-02-02

## 2021-02-01 RX ORDER — PROPOFOL 10 MG/ML
VIAL (ML) INTRAVENOUS
Status: DISCONTINUED | OUTPATIENT
Start: 2021-02-01 | End: 2021-02-01

## 2021-02-01 RX ORDER — HYDROMORPHONE HYDROCHLORIDE 1 MG/ML
0.2 INJECTION, SOLUTION INTRAMUSCULAR; INTRAVENOUS; SUBCUTANEOUS EVERY 5 MIN PRN
Status: CANCELLED | OUTPATIENT
Start: 2021-02-01

## 2021-02-01 RX ORDER — ACETAMINOPHEN 500 MG
1000 TABLET ORAL EVERY 6 HOURS SCHEDULED
Status: DISPENSED | OUTPATIENT
Start: 2021-02-01 | End: 2021-02-03

## 2021-02-01 RX ORDER — LIDOCAINE HCL/PF 100 MG/5ML
SYRINGE (ML) INTRAVENOUS
Status: DISCONTINUED | OUTPATIENT
Start: 2021-02-01 | End: 2021-02-01

## 2021-02-01 RX ORDER — CEFAZOLIN SODIUM 1 G/3ML
2 INJECTION, POWDER, FOR SOLUTION INTRAMUSCULAR; INTRAVENOUS
Status: COMPLETED | OUTPATIENT
Start: 2021-02-01 | End: 2021-02-01

## 2021-02-01 RX ORDER — HYDROCODONE BITARTRATE AND ACETAMINOPHEN 500; 5 MG/1; MG/1
TABLET ORAL
Status: DISCONTINUED | OUTPATIENT
Start: 2021-02-01 | End: 2021-02-04

## 2021-02-01 RX ORDER — CEFAZOLIN SODIUM 1 G/3ML
1 INJECTION, POWDER, FOR SOLUTION INTRAMUSCULAR; INTRAVENOUS
Status: COMPLETED | OUTPATIENT
Start: 2021-02-01 | End: 2021-02-02

## 2021-02-01 RX ORDER — VANCOMYCIN HCL IN 5 % DEXTROSE 1G/250ML
1000 PLASTIC BAG, INJECTION (ML) INTRAVENOUS
Status: COMPLETED | OUTPATIENT
Start: 2021-02-01 | End: 2021-02-01

## 2021-02-01 RX ORDER — ACETAMINOPHEN 10 MG/ML
INJECTION, SOLUTION INTRAVENOUS
Status: DISCONTINUED | OUTPATIENT
Start: 2021-02-01 | End: 2021-02-01

## 2021-02-01 RX ORDER — MIDAZOLAM HYDROCHLORIDE 1 MG/ML
INJECTION INTRAMUSCULAR; INTRAVENOUS
Status: DISCONTINUED | OUTPATIENT
Start: 2021-02-01 | End: 2021-02-01

## 2021-02-01 RX ORDER — NEOSTIGMINE METHYLSULFATE 1 MG/ML
INJECTION, SOLUTION INTRAVENOUS
Status: DISCONTINUED | OUTPATIENT
Start: 2021-02-01 | End: 2021-02-01

## 2021-02-01 RX ORDER — VANCOMYCIN HYDROCHLORIDE 1 G/20ML
INJECTION, POWDER, LYOPHILIZED, FOR SOLUTION INTRAVENOUS
Status: DISCONTINUED | OUTPATIENT
Start: 2021-02-01 | End: 2021-02-01 | Stop reason: HOSPADM

## 2021-02-01 RX ORDER — KETAMINE HCL IN 0.9 % NACL 50 MG/5 ML
SYRINGE (ML) INTRAVENOUS
Status: DISCONTINUED | OUTPATIENT
Start: 2021-02-01 | End: 2021-02-01

## 2021-02-01 RX ORDER — FENTANYL CITRATE 50 UG/ML
INJECTION, SOLUTION INTRAMUSCULAR; INTRAVENOUS
Status: DISCONTINUED | OUTPATIENT
Start: 2021-02-01 | End: 2021-02-01

## 2021-02-01 RX ORDER — HYDROMORPHONE HYDROCHLORIDE 1 MG/ML
0.2 INJECTION, SOLUTION INTRAMUSCULAR; INTRAVENOUS; SUBCUTANEOUS EVERY 5 MIN PRN
Status: DISCONTINUED | OUTPATIENT
Start: 2021-02-01 | End: 2021-02-01 | Stop reason: HOSPADM

## 2021-02-01 RX ORDER — TRANEXAMIC ACID 100 MG/ML
1000 INJECTION, SOLUTION INTRAVENOUS
Status: COMPLETED | OUTPATIENT
Start: 2021-02-01 | End: 2021-02-01

## 2021-02-01 RX ORDER — PREGABALIN 75 MG/1
75 CAPSULE ORAL NIGHTLY
Status: DISCONTINUED | OUTPATIENT
Start: 2021-02-01 | End: 2021-02-10 | Stop reason: HOSPADM

## 2021-02-01 RX ORDER — PHENYLEPHRINE HCL IN 0.9% NACL 1 MG/10 ML
SYRINGE (ML) INTRAVENOUS
Status: DISCONTINUED | OUTPATIENT
Start: 2021-02-01 | End: 2021-02-01

## 2021-02-01 RX ORDER — DEXAMETHASONE SODIUM PHOSPHATE 4 MG/ML
INJECTION, SOLUTION INTRA-ARTICULAR; INTRALESIONAL; INTRAMUSCULAR; INTRAVENOUS; SOFT TISSUE
Status: DISCONTINUED | OUTPATIENT
Start: 2021-02-01 | End: 2021-02-01

## 2021-02-01 RX ORDER — ROCURONIUM BROMIDE 10 MG/ML
INJECTION, SOLUTION INTRAVENOUS
Status: DISCONTINUED | OUTPATIENT
Start: 2021-02-01 | End: 2021-02-01

## 2021-02-01 RX ORDER — ROPIVACAINE HYDROCHLORIDE 2 MG/ML
2 INJECTION, SOLUTION EPIDURAL; INFILTRATION; PERINEURAL CONTINUOUS
Status: DISCONTINUED | OUTPATIENT
Start: 2021-02-01 | End: 2021-02-04

## 2021-02-01 RX ORDER — ONDANSETRON HYDROCHLORIDE 2 MG/ML
INJECTION, SOLUTION INTRAMUSCULAR; INTRAVENOUS
Status: DISCONTINUED | OUTPATIENT
Start: 2021-02-01 | End: 2021-02-01

## 2021-02-01 RX ORDER — BUPIVACAINE HYDROCHLORIDE 2.5 MG/ML
INJECTION, SOLUTION EPIDURAL; INFILTRATION; INTRACAUDAL
Status: DISCONTINUED | OUTPATIENT
Start: 2021-02-01 | End: 2021-02-01

## 2021-02-01 RX ADMIN — FAMOTIDINE 20 MG: 20 TABLET, FILM COATED ORAL at 11:02

## 2021-02-01 RX ADMIN — METOPROLOL SUCCINATE 25 MG: 25 TABLET, EXTENDED RELEASE ORAL at 11:02

## 2021-02-01 RX ADMIN — LIDOCAINE HYDROCHLORIDE 60 MG: 20 INJECTION, SOLUTION INTRAVENOUS at 07:02

## 2021-02-01 RX ADMIN — NEOSTIGMINE METHYLSULFATE 5 MG: 1 INJECTION INTRAVENOUS at 09:02

## 2021-02-01 RX ADMIN — Medication 100 MCG: at 09:02

## 2021-02-01 RX ADMIN — ROPIVACAINE HYDROCHLORIDE 2 ML/HR: 2 INJECTION, SOLUTION EPIDURAL; INFILTRATION at 11:02

## 2021-02-01 RX ADMIN — ROCURONIUM BROMIDE 30 MG: 10 INJECTION, SOLUTION INTRAVENOUS at 07:02

## 2021-02-01 RX ADMIN — ACETAMINOPHEN 1000 MG: 500 TABLET ORAL at 06:02

## 2021-02-01 RX ADMIN — LEVOFLOXACIN 500 MG: 500 TABLET, FILM COATED ORAL at 01:02

## 2021-02-01 RX ADMIN — PREGABALIN 75 MG: 75 CAPSULE ORAL at 08:02

## 2021-02-01 RX ADMIN — ONDANSETRON 4 MG: 2 INJECTION, SOLUTION INTRAMUSCULAR; INTRAVENOUS at 08:02

## 2021-02-01 RX ADMIN — ESCITALOPRAM OXALATE 20 MG: 20 TABLET ORAL at 08:02

## 2021-02-01 RX ADMIN — HYDROMORPHONE HYDROCHLORIDE 0.2 MG: 1 INJECTION, SOLUTION INTRAMUSCULAR; INTRAVENOUS; SUBCUTANEOUS at 07:02

## 2021-02-01 RX ADMIN — Medication 100 MCG: at 07:02

## 2021-02-01 RX ADMIN — BUPIVACAINE HYDROCHLORIDE 40 ML: 2.5 INJECTION, SOLUTION EPIDURAL; INFILTRATION; INTRACAUDAL; PERINEURAL at 10:02

## 2021-02-01 RX ADMIN — ROCURONIUM BROMIDE 20 MG: 10 INJECTION, SOLUTION INTRAVENOUS at 08:02

## 2021-02-01 RX ADMIN — Medication 10 MG: at 09:02

## 2021-02-01 RX ADMIN — ACYCLOVIR 400 MG: 200 CAPSULE ORAL at 08:02

## 2021-02-01 RX ADMIN — HYDROMORPHONE HYDROCHLORIDE 0.2 MG: 1 INJECTION, SOLUTION INTRAMUSCULAR; INTRAVENOUS; SUBCUTANEOUS at 03:02

## 2021-02-01 RX ADMIN — VANCOMYCIN HYDROCHLORIDE 1000 MG: 1 INJECTION, POWDER, LYOPHILIZED, FOR SOLUTION INTRAVENOUS at 07:02

## 2021-02-01 RX ADMIN — PROPOFOL 130 MG: 10 INJECTION, EMULSION INTRAVENOUS at 07:02

## 2021-02-01 RX ADMIN — CEFAZOLIN 2 G: 330 INJECTION, POWDER, FOR SOLUTION INTRAMUSCULAR; INTRAVENOUS at 07:02

## 2021-02-01 RX ADMIN — MUPIROCIN: 20 OINTMENT TOPICAL at 07:02

## 2021-02-01 RX ADMIN — DOCUSATE SODIUM 50MG AND SENNOSIDES 8.6MG 1 TABLET: 8.6; 5 TABLET, FILM COATED ORAL at 11:02

## 2021-02-01 RX ADMIN — CEFAZOLIN 1 G: 1 INJECTION, POWDER, FOR SOLUTION INTRAMUSCULAR; INTRAVENOUS at 06:02

## 2021-02-01 RX ADMIN — Medication 40 MG: at 08:02

## 2021-02-01 RX ADMIN — MIDAZOLAM HYDROCHLORIDE 1 MG: 1 INJECTION, SOLUTION INTRAMUSCULAR; INTRAVENOUS at 07:02

## 2021-02-01 RX ADMIN — CEFAZOLIN 1 G: 1 INJECTION, POWDER, FOR SOLUTION INTRAMUSCULAR; INTRAVENOUS at 11:02

## 2021-02-01 RX ADMIN — ACETAMINOPHEN 1000 MG: 10 INJECTION, SOLUTION INTRAVENOUS at 08:02

## 2021-02-01 RX ADMIN — ACYCLOVIR 400 MG: 200 CAPSULE ORAL at 01:02

## 2021-02-01 RX ADMIN — OXYCODONE HYDROCHLORIDE 10 MG: 10 TABLET ORAL at 11:02

## 2021-02-01 RX ADMIN — OXYCODONE HYDROCHLORIDE 10 MG: 10 TABLET ORAL at 04:02

## 2021-02-01 RX ADMIN — FENTANYL CITRATE 50 MCG: 50 INJECTION, SOLUTION INTRAMUSCULAR; INTRAVENOUS at 07:02

## 2021-02-01 RX ADMIN — MUPIROCIN: 20 OINTMENT TOPICAL at 08:02

## 2021-02-01 RX ADMIN — GLYCOPYRROLATE 0.6 MG: 0.2 INJECTION, SOLUTION INTRAMUSCULAR; INTRAVITREAL at 09:02

## 2021-02-01 RX ADMIN — TRANEXAMIC ACID 1000 MG: 1 INJECTION, SOLUTION INTRAVENOUS at 08:02

## 2021-02-01 RX ADMIN — ACETAMINOPHEN 1000 MG: 500 TABLET ORAL at 03:02

## 2021-02-01 RX ADMIN — SODIUM CHLORIDE: 0.9 INJECTION, SOLUTION INTRAVENOUS at 05:02

## 2021-02-01 RX ADMIN — Medication 100 MCG: at 08:02

## 2021-02-01 RX ADMIN — HYDROMORPHONE HYDROCHLORIDE 0.2 MG: 1 INJECTION, SOLUTION INTRAMUSCULAR; INTRAVENOUS; SUBCUTANEOUS at 12:02

## 2021-02-01 RX ADMIN — DEXAMETHASONE SODIUM PHOSPHATE 8 MG: 4 INJECTION INTRA-ARTICULAR; INTRALESIONAL; INTRAMUSCULAR; INTRAVENOUS; SOFT TISSUE at 08:02

## 2021-02-01 RX ADMIN — TRANEXAMIC ACID 1000 MG: 1 INJECTION, SOLUTION INTRAVENOUS at 09:02

## 2021-02-01 RX ADMIN — MUPIROCIN: 20 OINTMENT TOPICAL at 11:02

## 2021-02-02 PROBLEM — H53.143 VISUAL DISCOMFORT OF BOTH EYES: Status: ACTIVE | Noted: 2021-02-02

## 2021-02-02 PROBLEM — R74.01 TRANSAMINITIS: Status: ACTIVE | Noted: 2021-02-02

## 2021-02-02 LAB
ALBUMIN SERPL BCP-MCNC: 3 G/DL (ref 3.5–5.2)
ALP SERPL-CCNC: 107 U/L (ref 55–135)
ALT SERPL W/O P-5'-P-CCNC: 49 U/L (ref 10–44)
ANION GAP SERPL CALC-SCNC: 10 MMOL/L (ref 8–16)
ANISOCYTOSIS BLD QL SMEAR: SLIGHT
AST SERPL-CCNC: 45 U/L (ref 10–40)
BASOPHILS # BLD AUTO: 0 K/UL (ref 0–0.2)
BASOPHILS NFR BLD: 0 % (ref 0–1.9)
BILIRUB SERPL-MCNC: 0.7 MG/DL (ref 0.1–1)
BUN SERPL-MCNC: 31 MG/DL (ref 8–23)
CALCIUM SERPL-MCNC: 9.3 MG/DL (ref 8.7–10.5)
CHLORIDE SERPL-SCNC: 105 MMOL/L (ref 95–110)
CO2 SERPL-SCNC: 21 MMOL/L (ref 23–29)
CREAT SERPL-MCNC: 1.1 MG/DL (ref 0.5–1.4)
DIFFERENTIAL METHOD: ABNORMAL
EOSINOPHIL # BLD AUTO: 0 K/UL (ref 0–0.5)
EOSINOPHIL NFR BLD: 0 % (ref 0–8)
ERYTHROCYTE [DISTWIDTH] IN BLOOD BY AUTOMATED COUNT: 14.5 % (ref 11.5–14.5)
EST. GFR  (AFRICAN AMERICAN): 59.2 ML/MIN/1.73 M^2
EST. GFR  (NON AFRICAN AMERICAN): 51.3 ML/MIN/1.73 M^2
GLUCOSE SERPL-MCNC: 109 MG/DL (ref 70–110)
HCT VFR BLD AUTO: 22.2 % (ref 37–48.5)
HGB BLD-MCNC: 7.4 G/DL (ref 12–16)
IMM GRANULOCYTES # BLD AUTO: 0 K/UL (ref 0–0.04)
IMM GRANULOCYTES NFR BLD AUTO: 0 % (ref 0–0.5)
LYMPHOCYTES # BLD AUTO: 0.3 K/UL (ref 1–4.8)
LYMPHOCYTES NFR BLD: 96.4 % (ref 18–48)
MAGNESIUM SERPL-MCNC: 2 MG/DL (ref 1.6–2.6)
MCH RBC QN AUTO: 30.2 PG (ref 27–31)
MCHC RBC AUTO-ENTMCNC: 33.3 G/DL (ref 32–36)
MCV RBC AUTO: 91 FL (ref 82–98)
MONOCYTES # BLD AUTO: 0 K/UL (ref 0.3–1)
MONOCYTES NFR BLD: 0 % (ref 4–15)
NEUTROPHILS # BLD AUTO: 0 K/UL (ref 1.8–7.7)
NEUTROPHILS NFR BLD: 3.6 % (ref 38–73)
NRBC BLD-RTO: 0 /100 WBC
PHOSPHATE SERPL-MCNC: 4 MG/DL (ref 2.7–4.5)
PLATELET # BLD AUTO: 59 K/UL (ref 150–350)
PLATELET BLD QL SMEAR: ABNORMAL
PMV BLD AUTO: 11.4 FL (ref 9.2–12.9)
POTASSIUM SERPL-SCNC: 5.3 MMOL/L (ref 3.5–5.1)
PROT SERPL-MCNC: 5.6 G/DL (ref 6–8.4)
RBC # BLD AUTO: 2.45 M/UL (ref 4–5.4)
SODIUM SERPL-SCNC: 136 MMOL/L (ref 136–145)
WBC # BLD AUTO: 0.28 K/UL (ref 3.9–12.7)

## 2021-02-02 PROCEDURE — 63600175 PHARM REV CODE 636 W HCPCS: Performed by: STUDENT IN AN ORGANIZED HEALTH CARE EDUCATION/TRAINING PROGRAM

## 2021-02-02 PROCEDURE — 99233 PR SUBSEQUENT HOSPITAL CARE,LEVL III: ICD-10-PCS | Mod: ,,, | Performed by: INTERNAL MEDICINE

## 2021-02-02 PROCEDURE — 25000003 PHARM REV CODE 250: Performed by: ORTHOPAEDIC SURGERY

## 2021-02-02 PROCEDURE — 25000003 PHARM REV CODE 250: Performed by: INTERNAL MEDICINE

## 2021-02-02 PROCEDURE — 97530 THERAPEUTIC ACTIVITIES: CPT

## 2021-02-02 PROCEDURE — 83735 ASSAY OF MAGNESIUM: CPT

## 2021-02-02 PROCEDURE — 36415 COLL VENOUS BLD VENIPUNCTURE: CPT

## 2021-02-02 PROCEDURE — 84100 ASSAY OF PHOSPHORUS: CPT

## 2021-02-02 PROCEDURE — 99231 SBSQ HOSP IP/OBS SF/LOW 25: CPT | Mod: ,,, | Performed by: ANESTHESIOLOGY

## 2021-02-02 PROCEDURE — 80053 COMPREHEN METABOLIC PANEL: CPT

## 2021-02-02 PROCEDURE — 85025 COMPLETE CBC W/AUTO DIFF WBC: CPT

## 2021-02-02 PROCEDURE — 99231 PR SUBSEQUENT HOSPITAL CARE,LEVL I: ICD-10-PCS | Mod: ,,, | Performed by: ANESTHESIOLOGY

## 2021-02-02 PROCEDURE — 97166 OT EVAL MOD COMPLEX 45 MIN: CPT

## 2021-02-02 PROCEDURE — 97162 PT EVAL MOD COMPLEX 30 MIN: CPT

## 2021-02-02 PROCEDURE — 20600001 HC STEP DOWN PRIVATE ROOM

## 2021-02-02 PROCEDURE — 25000003 PHARM REV CODE 250: Performed by: STUDENT IN AN ORGANIZED HEALTH CARE EDUCATION/TRAINING PROGRAM

## 2021-02-02 PROCEDURE — 99233 SBSQ HOSP IP/OBS HIGH 50: CPT | Mod: ,,, | Performed by: INTERNAL MEDICINE

## 2021-02-02 RX ORDER — HYDROMORPHONE HYDROCHLORIDE 1 MG/ML
0.5 INJECTION, SOLUTION INTRAMUSCULAR; INTRAVENOUS; SUBCUTANEOUS EVERY 6 HOURS PRN
Status: DISCONTINUED | OUTPATIENT
Start: 2021-02-02 | End: 2021-02-04

## 2021-02-02 RX ORDER — ENOXAPARIN SODIUM 100 MG/ML
30 INJECTION SUBCUTANEOUS EVERY 24 HOURS
Status: DISCONTINUED | OUTPATIENT
Start: 2021-02-03 | End: 2021-02-02

## 2021-02-02 RX ORDER — ENOXAPARIN SODIUM 100 MG/ML
40 INJECTION SUBCUTANEOUS EVERY 24 HOURS
Status: DISCONTINUED | OUTPATIENT
Start: 2021-02-03 | End: 2021-02-03

## 2021-02-02 RX ADMIN — FAMOTIDINE 20 MG: 20 TABLET, FILM COATED ORAL at 08:02

## 2021-02-02 RX ADMIN — MUPIROCIN: 20 OINTMENT TOPICAL at 08:02

## 2021-02-02 RX ADMIN — FLUCONAZOLE 400 MG: 200 TABLET ORAL at 08:02

## 2021-02-02 RX ADMIN — METOPROLOL SUCCINATE 25 MG: 25 TABLET, EXTENDED RELEASE ORAL at 08:02

## 2021-02-02 RX ADMIN — ACYCLOVIR 400 MG: 200 CAPSULE ORAL at 08:02

## 2021-02-02 RX ADMIN — DOCUSATE SODIUM 50MG AND SENNOSIDES 8.6MG 1 TABLET: 8.6; 5 TABLET, FILM COATED ORAL at 08:02

## 2021-02-02 RX ADMIN — ROPIVACAINE HYDROCHLORIDE 2 ML/HR: 2 INJECTION, SOLUTION EPIDURAL; INFILTRATION at 02:02

## 2021-02-02 RX ADMIN — ENOXAPARIN SODIUM 40 MG: 40 INJECTION SUBCUTANEOUS at 07:02

## 2021-02-02 RX ADMIN — CEFAZOLIN 1 G: 1 INJECTION, POWDER, FOR SOLUTION INTRAMUSCULAR; INTRAVENOUS at 03:02

## 2021-02-02 RX ADMIN — LEVOFLOXACIN 500 MG: 500 TABLET, FILM COATED ORAL at 08:02

## 2021-02-02 RX ADMIN — ACYCLOVIR 400 MG: 200 CAPSULE ORAL at 09:02

## 2021-02-02 RX ADMIN — MUPIROCIN: 20 OINTMENT TOPICAL at 09:02

## 2021-02-02 RX ADMIN — PREGABALIN 75 MG: 75 CAPSULE ORAL at 09:02

## 2021-02-02 RX ADMIN — ACETAMINOPHEN 1000 MG: 500 TABLET ORAL at 07:02

## 2021-02-02 RX ADMIN — ROPIVACAINE HYDROCHLORIDE 2 ML/HR: 2 INJECTION, SOLUTION EPIDURAL; INFILTRATION at 04:02

## 2021-02-02 RX ADMIN — ESCITALOPRAM OXALATE 20 MG: 20 TABLET ORAL at 09:02

## 2021-02-02 RX ADMIN — ACETAMINOPHEN 1000 MG: 500 TABLET ORAL at 05:02

## 2021-02-03 ENCOUNTER — DOCUMENTATION ONLY (OUTPATIENT)
Dept: ORTHOPEDICS | Facility: CLINIC | Age: 71
End: 2021-02-03

## 2021-02-03 LAB
ABO + RH BLD: NORMAL
ALBUMIN SERPL BCP-MCNC: 2.7 G/DL (ref 3.5–5.2)
ALP SERPL-CCNC: 95 U/L (ref 55–135)
ALT SERPL W/O P-5'-P-CCNC: 27 U/L (ref 10–44)
ANION GAP SERPL CALC-SCNC: 9 MMOL/L (ref 8–16)
ANISOCYTOSIS BLD QL SMEAR: SLIGHT
AST SERPL-CCNC: 26 U/L (ref 10–40)
BASOPHILS # BLD AUTO: 0 K/UL (ref 0–0.2)
BASOPHILS NFR BLD: 0 % (ref 0–1.9)
BILIRUB SERPL-MCNC: 0.7 MG/DL (ref 0.1–1)
BLD GP AB SCN CELLS X3 SERPL QL: NORMAL
BUN SERPL-MCNC: 33 MG/DL (ref 8–23)
CALCIUM SERPL-MCNC: 9 MG/DL (ref 8.7–10.5)
CHLORIDE SERPL-SCNC: 106 MMOL/L (ref 95–110)
CO2 SERPL-SCNC: 24 MMOL/L (ref 23–29)
CREAT SERPL-MCNC: 1 MG/DL (ref 0.5–1.4)
DACRYOCYTES BLD QL SMEAR: ABNORMAL
DIFFERENTIAL METHOD: ABNORMAL
EOSINOPHIL # BLD AUTO: 0 K/UL (ref 0–0.5)
EOSINOPHIL NFR BLD: 0 % (ref 0–8)
ERYTHROCYTE [DISTWIDTH] IN BLOOD BY AUTOMATED COUNT: 14.6 % (ref 11.5–14.5)
EST. GFR  (AFRICAN AMERICAN): >60 ML/MIN/1.73 M^2
EST. GFR  (NON AFRICAN AMERICAN): 57.6 ML/MIN/1.73 M^2
GLUCOSE SERPL-MCNC: 92 MG/DL (ref 70–110)
HCT VFR BLD AUTO: 22.5 % (ref 37–48.5)
HGB BLD-MCNC: 7.3 G/DL (ref 12–16)
HYPOCHROMIA BLD QL SMEAR: ABNORMAL
IMM GRANULOCYTES # BLD AUTO: 0 K/UL (ref 0–0.04)
IMM GRANULOCYTES NFR BLD AUTO: 0 % (ref 0–0.5)
LYMPHOCYTES # BLD AUTO: 0.3 K/UL (ref 1–4.8)
LYMPHOCYTES NFR BLD: 96.9 % (ref 18–48)
MAGNESIUM SERPL-MCNC: 2 MG/DL (ref 1.6–2.6)
MCH RBC QN AUTO: 30.2 PG (ref 27–31)
MCHC RBC AUTO-ENTMCNC: 32.4 G/DL (ref 32–36)
MCV RBC AUTO: 93 FL (ref 82–98)
MONOCYTES # BLD AUTO: 0 K/UL (ref 0.3–1)
MONOCYTES NFR BLD: 0 % (ref 4–15)
NEUTROPHILS # BLD AUTO: 0 K/UL (ref 1.8–7.7)
NEUTROPHILS NFR BLD: 3.1 % (ref 38–73)
NRBC BLD-RTO: 0 /100 WBC
OVALOCYTES BLD QL SMEAR: ABNORMAL
PHOSPHATE SERPL-MCNC: 3.2 MG/DL (ref 2.7–4.5)
PLATELET # BLD AUTO: 33 K/UL (ref 150–350)
PLATELET BLD QL SMEAR: ABNORMAL
PMV BLD AUTO: 11.2 FL (ref 9.2–12.9)
POIKILOCYTOSIS BLD QL SMEAR: SLIGHT
POTASSIUM SERPL-SCNC: 4.8 MMOL/L (ref 3.5–5.1)
PROT SERPL-MCNC: 5.5 G/DL (ref 6–8.4)
RBC # BLD AUTO: 2.42 M/UL (ref 4–5.4)
SODIUM SERPL-SCNC: 139 MMOL/L (ref 136–145)
WBC # BLD AUTO: 0.32 K/UL (ref 3.9–12.7)

## 2021-02-03 PROCEDURE — 25000003 PHARM REV CODE 250: Performed by: INTERNAL MEDICINE

## 2021-02-03 PROCEDURE — 94799 UNLISTED PULMONARY SVC/PX: CPT

## 2021-02-03 PROCEDURE — 99231 PR SUBSEQUENT HOSPITAL CARE,LEVL I: ICD-10-PCS | Mod: ,,, | Performed by: ANESTHESIOLOGY

## 2021-02-03 PROCEDURE — 97110 THERAPEUTIC EXERCISES: CPT | Mod: CQ

## 2021-02-03 PROCEDURE — 99233 SBSQ HOSP IP/OBS HIGH 50: CPT | Mod: ,,, | Performed by: INTERNAL MEDICINE

## 2021-02-03 PROCEDURE — 86920 COMPATIBILITY TEST SPIN: CPT

## 2021-02-03 PROCEDURE — 83735 ASSAY OF MAGNESIUM: CPT

## 2021-02-03 PROCEDURE — 86900 BLOOD TYPING SEROLOGIC ABO: CPT

## 2021-02-03 PROCEDURE — 63600175 PHARM REV CODE 636 W HCPCS: Performed by: STUDENT IN AN ORGANIZED HEALTH CARE EDUCATION/TRAINING PROGRAM

## 2021-02-03 PROCEDURE — 94761 N-INVAS EAR/PLS OXIMETRY MLT: CPT

## 2021-02-03 PROCEDURE — 99231 SBSQ HOSP IP/OBS SF/LOW 25: CPT | Mod: ,,, | Performed by: ANESTHESIOLOGY

## 2021-02-03 PROCEDURE — 20600001 HC STEP DOWN PRIVATE ROOM

## 2021-02-03 PROCEDURE — 97530 THERAPEUTIC ACTIVITIES: CPT | Mod: CQ

## 2021-02-03 PROCEDURE — 80053 COMPREHEN METABOLIC PANEL: CPT

## 2021-02-03 PROCEDURE — 99900035 HC TECH TIME PER 15 MIN (STAT)

## 2021-02-03 PROCEDURE — 63600175 PHARM REV CODE 636 W HCPCS: Performed by: NURSE PRACTITIONER

## 2021-02-03 PROCEDURE — 84100 ASSAY OF PHOSPHORUS: CPT

## 2021-02-03 PROCEDURE — 97530 THERAPEUTIC ACTIVITIES: CPT

## 2021-02-03 PROCEDURE — 25000003 PHARM REV CODE 250: Performed by: STUDENT IN AN ORGANIZED HEALTH CARE EDUCATION/TRAINING PROGRAM

## 2021-02-03 PROCEDURE — 99233 PR SUBSEQUENT HOSPITAL CARE,LEVL III: ICD-10-PCS | Mod: ,,, | Performed by: INTERNAL MEDICINE

## 2021-02-03 PROCEDURE — 85025 COMPLETE CBC W/AUTO DIFF WBC: CPT

## 2021-02-03 RX ADMIN — ACYCLOVIR 400 MG: 200 CAPSULE ORAL at 08:02

## 2021-02-03 RX ADMIN — METHOCARBAMOL 500 MG: 500 TABLET ORAL at 08:02

## 2021-02-03 RX ADMIN — HYDROMORPHONE HYDROCHLORIDE 0.5 MG: 1 INJECTION, SOLUTION INTRAMUSCULAR; INTRAVENOUS; SUBCUTANEOUS at 10:02

## 2021-02-03 RX ADMIN — MUPIROCIN: 20 OINTMENT TOPICAL at 08:02

## 2021-02-03 RX ADMIN — PREGABALIN 75 MG: 75 CAPSULE ORAL at 08:02

## 2021-02-03 RX ADMIN — LEVOFLOXACIN 500 MG: 500 TABLET, FILM COATED ORAL at 08:02

## 2021-02-03 RX ADMIN — FAMOTIDINE 20 MG: 20 TABLET, FILM COATED ORAL at 08:02

## 2021-02-03 RX ADMIN — ROPIVACAINE HYDROCHLORIDE 2 ML/HR: 2 INJECTION, SOLUTION EPIDURAL; INFILTRATION at 08:02

## 2021-02-03 RX ADMIN — FLUCONAZOLE 400 MG: 200 TABLET ORAL at 08:02

## 2021-02-03 RX ADMIN — ACETAMINOPHEN 1000 MG: 500 TABLET ORAL at 05:02

## 2021-02-03 RX ADMIN — ACETAMINOPHEN 1000 MG: 500 TABLET ORAL at 12:02

## 2021-02-03 RX ADMIN — METHOCARBAMOL 500 MG: 500 TABLET ORAL at 05:02

## 2021-02-03 RX ADMIN — ROPIVACAINE HYDROCHLORIDE 2 ML/HR: 2 INJECTION, SOLUTION EPIDURAL; INFILTRATION at 05:02

## 2021-02-03 RX ADMIN — ESCITALOPRAM OXALATE 20 MG: 20 TABLET ORAL at 08:02

## 2021-02-03 RX ADMIN — DOCUSATE SODIUM 50MG AND SENNOSIDES 8.6MG 1 TABLET: 8.6; 5 TABLET, FILM COATED ORAL at 08:02

## 2021-02-03 RX ADMIN — METOPROLOL SUCCINATE 25 MG: 25 TABLET, EXTENDED RELEASE ORAL at 08:02

## 2021-02-04 PROBLEM — Z78.9 IMPAIRED MOBILITY AND ADLS: Status: ACTIVE | Noted: 2021-02-04

## 2021-02-04 PROBLEM — Z74.09 IMPAIRED MOBILITY AND ADLS: Status: ACTIVE | Noted: 2021-02-04

## 2021-02-04 PROBLEM — R74.01 TRANSAMINITIS: Status: RESOLVED | Noted: 2021-02-02 | Resolved: 2021-02-04

## 2021-02-04 LAB
ALBUMIN SERPL BCP-MCNC: 2.6 G/DL (ref 3.5–5.2)
ALP SERPL-CCNC: 86 U/L (ref 55–135)
ALT SERPL W/O P-5'-P-CCNC: 14 U/L (ref 10–44)
ANION GAP SERPL CALC-SCNC: 11 MMOL/L (ref 8–16)
ANISOCYTOSIS BLD QL SMEAR: SLIGHT
AST SERPL-CCNC: 18 U/L (ref 10–40)
BASOPHILS # BLD AUTO: 0 K/UL (ref 0–0.2)
BASOPHILS NFR BLD: 0 % (ref 0–1.9)
BILIRUB SERPL-MCNC: 0.5 MG/DL (ref 0.1–1)
BLD PROD TYP BPU: NORMAL
BLOOD UNIT EXPIRATION DATE: NORMAL
BLOOD UNIT TYPE CODE: 6200
BLOOD UNIT TYPE: NORMAL
BUN SERPL-MCNC: 28 MG/DL (ref 8–23)
CALCIUM SERPL-MCNC: 8.7 MG/DL (ref 8.7–10.5)
CHLORIDE SERPL-SCNC: 104 MMOL/L (ref 95–110)
CO2 SERPL-SCNC: 23 MMOL/L (ref 23–29)
CODING SYSTEM: NORMAL
CREAT SERPL-MCNC: 0.8 MG/DL (ref 0.5–1.4)
DIFFERENTIAL METHOD: ABNORMAL
DISPENSE STATUS: NORMAL
EOSINOPHIL # BLD AUTO: 0 K/UL (ref 0–0.5)
EOSINOPHIL NFR BLD: 0 % (ref 0–8)
ERYTHROCYTE [DISTWIDTH] IN BLOOD BY AUTOMATED COUNT: 14.4 % (ref 11.5–14.5)
EST. GFR  (AFRICAN AMERICAN): >60 ML/MIN/1.73 M^2
EST. GFR  (NON AFRICAN AMERICAN): >60 ML/MIN/1.73 M^2
GLUCOSE SERPL-MCNC: 88 MG/DL (ref 70–110)
HCT VFR BLD AUTO: 20.3 % (ref 37–48.5)
HGB BLD-MCNC: 6.7 G/DL (ref 12–16)
HYPOCHROMIA BLD QL SMEAR: ABNORMAL
IMM GRANULOCYTES # BLD AUTO: 0 K/UL (ref 0–0.04)
IMM GRANULOCYTES NFR BLD AUTO: 0 % (ref 0–0.5)
LYMPHOCYTES # BLD AUTO: 0.3 K/UL (ref 1–4.8)
LYMPHOCYTES NFR BLD: 100 % (ref 18–48)
MAGNESIUM SERPL-MCNC: 1.7 MG/DL (ref 1.6–2.6)
MCH RBC QN AUTO: 30.6 PG (ref 27–31)
MCHC RBC AUTO-ENTMCNC: 33 G/DL (ref 32–36)
MCV RBC AUTO: 93 FL (ref 82–98)
MONOCYTES # BLD AUTO: 0 K/UL (ref 0.3–1)
MONOCYTES NFR BLD: 0 % (ref 4–15)
NEUTROPHILS # BLD AUTO: 0 K/UL (ref 1.8–7.7)
NEUTROPHILS NFR BLD: 0 % (ref 38–73)
NRBC BLD-RTO: 0 /100 WBC
NUM UNITS TRANS PACKED RBC: NORMAL
OVALOCYTES BLD QL SMEAR: ABNORMAL
PHOSPHATE SERPL-MCNC: 3.1 MG/DL (ref 2.7–4.5)
PLATELET # BLD AUTO: 23 K/UL (ref 150–350)
PMV BLD AUTO: 11 FL (ref 9.2–12.9)
POIKILOCYTOSIS BLD QL SMEAR: SLIGHT
POLYCHROMASIA BLD QL SMEAR: ABNORMAL
POTASSIUM SERPL-SCNC: 4 MMOL/L (ref 3.5–5.1)
PROT SERPL-MCNC: 5.1 G/DL (ref 6–8.4)
RBC # BLD AUTO: 2.19 M/UL (ref 4–5.4)
SODIUM SERPL-SCNC: 138 MMOL/L (ref 136–145)
WBC # BLD AUTO: 0.29 K/UL (ref 3.9–12.7)

## 2021-02-04 PROCEDURE — P9040 RBC LEUKOREDUCED IRRADIATED: HCPCS

## 2021-02-04 PROCEDURE — 25000003 PHARM REV CODE 250: Performed by: INTERNAL MEDICINE

## 2021-02-04 PROCEDURE — 99233 SBSQ HOSP IP/OBS HIGH 50: CPT | Mod: ,,, | Performed by: INTERNAL MEDICINE

## 2021-02-04 PROCEDURE — 99231 PR SUBSEQUENT HOSPITAL CARE,LEVL I: ICD-10-PCS | Mod: ,,, | Performed by: ANESTHESIOLOGY

## 2021-02-04 PROCEDURE — 97530 THERAPEUTIC ACTIVITIES: CPT | Mod: CQ

## 2021-02-04 PROCEDURE — 99222 PR INITIAL HOSPITAL CARE,LEVL II: ICD-10-PCS | Mod: ,,, | Performed by: NURSE PRACTITIONER

## 2021-02-04 PROCEDURE — 80053 COMPREHEN METABOLIC PANEL: CPT

## 2021-02-04 PROCEDURE — 36415 COLL VENOUS BLD VENIPUNCTURE: CPT

## 2021-02-04 PROCEDURE — 99222 1ST HOSP IP/OBS MODERATE 55: CPT | Mod: ,,, | Performed by: NURSE PRACTITIONER

## 2021-02-04 PROCEDURE — 97116 GAIT TRAINING THERAPY: CPT | Mod: CQ

## 2021-02-04 PROCEDURE — 99233 PR SUBSEQUENT HOSPITAL CARE,LEVL III: ICD-10-PCS | Mod: ,,, | Performed by: INTERNAL MEDICINE

## 2021-02-04 PROCEDURE — 84100 ASSAY OF PHOSPHORUS: CPT

## 2021-02-04 PROCEDURE — 25000003 PHARM REV CODE 250: Performed by: NURSE PRACTITIONER

## 2021-02-04 PROCEDURE — 99231 SBSQ HOSP IP/OBS SF/LOW 25: CPT | Mod: ,,, | Performed by: ANESTHESIOLOGY

## 2021-02-04 PROCEDURE — 85025 COMPLETE CBC W/AUTO DIFF WBC: CPT

## 2021-02-04 PROCEDURE — 20600001 HC STEP DOWN PRIVATE ROOM

## 2021-02-04 PROCEDURE — 97110 THERAPEUTIC EXERCISES: CPT | Mod: CQ

## 2021-02-04 PROCEDURE — 97530 THERAPEUTIC ACTIVITIES: CPT

## 2021-02-04 PROCEDURE — 83735 ASSAY OF MAGNESIUM: CPT

## 2021-02-04 PROCEDURE — 25000003 PHARM REV CODE 250: Performed by: STUDENT IN AN ORGANIZED HEALTH CARE EDUCATION/TRAINING PROGRAM

## 2021-02-04 PROCEDURE — 97535 SELF CARE MNGMENT TRAINING: CPT

## 2021-02-04 RX ORDER — DIPHENHYDRAMINE HCL 25 MG
25 CAPSULE ORAL EVERY 6 HOURS PRN
Status: DISCONTINUED | OUTPATIENT
Start: 2021-02-04 | End: 2021-02-10 | Stop reason: HOSPADM

## 2021-02-04 RX ORDER — POLYETHYLENE GLYCOL 3350 17 G/17G
17 POWDER, FOR SOLUTION ORAL 2 TIMES DAILY PRN
Status: DISCONTINUED | OUTPATIENT
Start: 2021-02-04 | End: 2021-02-07

## 2021-02-04 RX ORDER — HYDROCODONE BITARTRATE AND ACETAMINOPHEN 500; 5 MG/1; MG/1
TABLET ORAL
Status: DISCONTINUED | OUTPATIENT
Start: 2021-02-04 | End: 2021-02-08

## 2021-02-04 RX ORDER — ACETAMINOPHEN 325 MG/1
650 TABLET ORAL EVERY 6 HOURS PRN
Status: DISCONTINUED | OUTPATIENT
Start: 2021-02-04 | End: 2021-02-10 | Stop reason: HOSPADM

## 2021-02-04 RX ORDER — OXYCODONE HYDROCHLORIDE 5 MG/1
5 TABLET ORAL EVERY 6 HOURS PRN
Status: DISCONTINUED | OUTPATIENT
Start: 2021-02-04 | End: 2021-02-10 | Stop reason: HOSPADM

## 2021-02-04 RX ADMIN — METOPROLOL SUCCINATE 25 MG: 25 TABLET, EXTENDED RELEASE ORAL at 08:02

## 2021-02-04 RX ADMIN — DOCUSATE SODIUM 50MG AND SENNOSIDES 8.6MG 1 TABLET: 8.6; 5 TABLET, FILM COATED ORAL at 08:02

## 2021-02-04 RX ADMIN — ACETAMINOPHEN 650 MG: 325 TABLET ORAL at 09:02

## 2021-02-04 RX ADMIN — MUPIROCIN: 20 OINTMENT TOPICAL at 08:02

## 2021-02-04 RX ADMIN — ACYCLOVIR 400 MG: 200 CAPSULE ORAL at 08:02

## 2021-02-04 RX ADMIN — DIPHENHYDRAMINE HYDROCHLORIDE 25 MG: 25 CAPSULE ORAL at 09:02

## 2021-02-04 RX ADMIN — LEVOFLOXACIN 500 MG: 500 TABLET, FILM COATED ORAL at 08:02

## 2021-02-04 RX ADMIN — FLUCONAZOLE 400 MG: 200 TABLET ORAL at 08:02

## 2021-02-04 RX ADMIN — ESCITALOPRAM OXALATE 20 MG: 20 TABLET ORAL at 08:02

## 2021-02-04 RX ADMIN — PREGABALIN 75 MG: 75 CAPSULE ORAL at 08:02

## 2021-02-04 RX ADMIN — FAMOTIDINE 20 MG: 20 TABLET, FILM COATED ORAL at 08:02

## 2021-02-04 RX ADMIN — METHOCARBAMOL 500 MG: 500 TABLET ORAL at 09:02

## 2021-02-05 LAB
ALBUMIN SERPL BCP-MCNC: 2.5 G/DL (ref 3.5–5.2)
ALP SERPL-CCNC: 84 U/L (ref 55–135)
ALT SERPL W/O P-5'-P-CCNC: 10 U/L (ref 10–44)
ANION GAP SERPL CALC-SCNC: 7 MMOL/L (ref 8–16)
ANISOCYTOSIS BLD QL SMEAR: SLIGHT
AST SERPL-CCNC: 14 U/L (ref 10–40)
BACTERIA BLD CULT: NORMAL
BACTERIA BLD CULT: NORMAL
BASOPHILS # BLD AUTO: 0 K/UL (ref 0–0.2)
BASOPHILS NFR BLD: 0 % (ref 0–1.9)
BILIRUB SERPL-MCNC: 0.7 MG/DL (ref 0.1–1)
BUN SERPL-MCNC: 21 MG/DL (ref 8–23)
CALCIUM SERPL-MCNC: 8.4 MG/DL (ref 8.7–10.5)
CHLORIDE SERPL-SCNC: 105 MMOL/L (ref 95–110)
CO2 SERPL-SCNC: 26 MMOL/L (ref 23–29)
CREAT SERPL-MCNC: 0.8 MG/DL (ref 0.5–1.4)
DIFFERENTIAL METHOD: ABNORMAL
EOSINOPHIL # BLD AUTO: 0 K/UL (ref 0–0.5)
EOSINOPHIL NFR BLD: 0 % (ref 0–8)
ERYTHROCYTE [DISTWIDTH] IN BLOOD BY AUTOMATED COUNT: 14.5 % (ref 11.5–14.5)
EST. GFR  (AFRICAN AMERICAN): >60 ML/MIN/1.73 M^2
EST. GFR  (NON AFRICAN AMERICAN): >60 ML/MIN/1.73 M^2
GLUCOSE SERPL-MCNC: 95 MG/DL (ref 70–110)
HCT VFR BLD AUTO: 23.2 % (ref 37–48.5)
HGB BLD-MCNC: 8 G/DL (ref 12–16)
IMM GRANULOCYTES # BLD AUTO: 0 K/UL (ref 0–0.04)
IMM GRANULOCYTES NFR BLD AUTO: 0 % (ref 0–0.5)
LYMPHOCYTES # BLD AUTO: 0.4 K/UL (ref 1–4.8)
LYMPHOCYTES NFR BLD: 95.6 % (ref 18–48)
MAGNESIUM SERPL-MCNC: 1.7 MG/DL (ref 1.6–2.6)
MCH RBC QN AUTO: 29.9 PG (ref 27–31)
MCHC RBC AUTO-ENTMCNC: 34.5 G/DL (ref 32–36)
MCV RBC AUTO: 87 FL (ref 82–98)
MONOCYTES # BLD AUTO: 0 K/UL (ref 0.3–1)
MONOCYTES NFR BLD: 4.4 % (ref 4–15)
NEUTROPHILS # BLD AUTO: 0 K/UL (ref 1.8–7.7)
NEUTROPHILS NFR BLD: 0 % (ref 38–73)
NRBC BLD-RTO: 0 /100 WBC
PHOSPHATE SERPL-MCNC: 3.5 MG/DL (ref 2.7–4.5)
PLATELET # BLD AUTO: 12 K/UL (ref 150–350)
PLATELET BLD QL SMEAR: ABNORMAL
PMV BLD AUTO: 10.5 FL (ref 9.2–12.9)
POTASSIUM SERPL-SCNC: 4.2 MMOL/L (ref 3.5–5.1)
PROT SERPL-MCNC: 4.9 G/DL (ref 6–8.4)
RBC # BLD AUTO: 2.68 M/UL (ref 4–5.4)
SODIUM SERPL-SCNC: 138 MMOL/L (ref 136–145)
WBC # BLD AUTO: 0.45 K/UL (ref 3.9–12.7)

## 2021-02-05 PROCEDURE — 99233 SBSQ HOSP IP/OBS HIGH 50: CPT | Mod: ,,, | Performed by: INTERNAL MEDICINE

## 2021-02-05 PROCEDURE — 80053 COMPREHEN METABOLIC PANEL: CPT

## 2021-02-05 PROCEDURE — 85025 COMPLETE CBC W/AUTO DIFF WBC: CPT

## 2021-02-05 PROCEDURE — 84100 ASSAY OF PHOSPHORUS: CPT

## 2021-02-05 PROCEDURE — 97530 THERAPEUTIC ACTIVITIES: CPT | Mod: CQ

## 2021-02-05 PROCEDURE — 99233 PR SUBSEQUENT HOSPITAL CARE,LEVL III: ICD-10-PCS | Mod: ,,, | Performed by: INTERNAL MEDICINE

## 2021-02-05 PROCEDURE — 20600001 HC STEP DOWN PRIVATE ROOM

## 2021-02-05 PROCEDURE — 25000003 PHARM REV CODE 250: Performed by: STUDENT IN AN ORGANIZED HEALTH CARE EDUCATION/TRAINING PROGRAM

## 2021-02-05 PROCEDURE — 97110 THERAPEUTIC EXERCISES: CPT | Mod: CQ

## 2021-02-05 PROCEDURE — 97116 GAIT TRAINING THERAPY: CPT | Mod: CQ

## 2021-02-05 PROCEDURE — 83735 ASSAY OF MAGNESIUM: CPT

## 2021-02-05 PROCEDURE — 25000003 PHARM REV CODE 250: Performed by: INTERNAL MEDICINE

## 2021-02-05 PROCEDURE — 36415 COLL VENOUS BLD VENIPUNCTURE: CPT

## 2021-02-05 RX ADMIN — ACYCLOVIR 400 MG: 200 CAPSULE ORAL at 09:02

## 2021-02-05 RX ADMIN — DOCUSATE SODIUM 50MG AND SENNOSIDES 8.6MG 1 TABLET: 8.6; 5 TABLET, FILM COATED ORAL at 09:02

## 2021-02-05 RX ADMIN — FLUCONAZOLE 400 MG: 200 TABLET ORAL at 09:02

## 2021-02-05 RX ADMIN — METOPROLOL SUCCINATE 25 MG: 25 TABLET, EXTENDED RELEASE ORAL at 09:02

## 2021-02-05 RX ADMIN — ACYCLOVIR 400 MG: 200 CAPSULE ORAL at 08:02

## 2021-02-05 RX ADMIN — PREGABALIN 75 MG: 75 CAPSULE ORAL at 08:02

## 2021-02-05 RX ADMIN — FAMOTIDINE 20 MG: 20 TABLET, FILM COATED ORAL at 09:02

## 2021-02-05 RX ADMIN — LEVOFLOXACIN 500 MG: 500 TABLET, FILM COATED ORAL at 09:02

## 2021-02-05 RX ADMIN — METHOCARBAMOL 500 MG: 500 TABLET ORAL at 08:02

## 2021-02-05 RX ADMIN — ESCITALOPRAM OXALATE 20 MG: 20 TABLET ORAL at 08:02

## 2021-02-06 LAB
ABO + RH BLD: NORMAL
ALBUMIN SERPL BCP-MCNC: 2.5 G/DL (ref 3.5–5.2)
ALP SERPL-CCNC: 82 U/L (ref 55–135)
ALT SERPL W/O P-5'-P-CCNC: 10 U/L (ref 10–44)
ANION GAP SERPL CALC-SCNC: 9 MMOL/L (ref 8–16)
AST SERPL-CCNC: 13 U/L (ref 10–40)
BASOPHILS # BLD AUTO: 0 K/UL (ref 0–0.2)
BASOPHILS # BLD AUTO: 0 K/UL (ref 0–0.2)
BASOPHILS NFR BLD: 0 % (ref 0–1.9)
BASOPHILS NFR BLD: 0 % (ref 0–1.9)
BILIRUB SERPL-MCNC: 0.5 MG/DL (ref 0.1–1)
BLD GP AB SCN CELLS X3 SERPL QL: NORMAL
BLD PROD TYP BPU: NORMAL
BLOOD UNIT EXPIRATION DATE: NORMAL
BLOOD UNIT TYPE CODE: 6200
BLOOD UNIT TYPE: NORMAL
BUN SERPL-MCNC: 18 MG/DL (ref 8–23)
CALCIUM SERPL-MCNC: 8.7 MG/DL (ref 8.7–10.5)
CHLORIDE SERPL-SCNC: 107 MMOL/L (ref 95–110)
CO2 SERPL-SCNC: 23 MMOL/L (ref 23–29)
CODING SYSTEM: NORMAL
CREAT SERPL-MCNC: 0.8 MG/DL (ref 0.5–1.4)
DIFFERENTIAL METHOD: ABNORMAL
DIFFERENTIAL METHOD: ABNORMAL
DISPENSE STATUS: NORMAL
EOSINOPHIL # BLD AUTO: 0 K/UL (ref 0–0.5)
EOSINOPHIL # BLD AUTO: 0 K/UL (ref 0–0.5)
EOSINOPHIL NFR BLD: 0 % (ref 0–8)
EOSINOPHIL NFR BLD: 0 % (ref 0–8)
ERYTHROCYTE [DISTWIDTH] IN BLOOD BY AUTOMATED COUNT: 14.3 % (ref 11.5–14.5)
ERYTHROCYTE [DISTWIDTH] IN BLOOD BY AUTOMATED COUNT: 14.3 % (ref 11.5–14.5)
EST. GFR  (AFRICAN AMERICAN): >60 ML/MIN/1.73 M^2
EST. GFR  (NON AFRICAN AMERICAN): >60 ML/MIN/1.73 M^2
GLUCOSE SERPL-MCNC: 99 MG/DL (ref 70–110)
HCT VFR BLD AUTO: 23.5 % (ref 37–48.5)
HCT VFR BLD AUTO: 23.5 % (ref 37–48.5)
HGB BLD-MCNC: 8.1 G/DL (ref 12–16)
HGB BLD-MCNC: 8.1 G/DL (ref 12–16)
IMM GRANULOCYTES # BLD AUTO: 0 K/UL (ref 0–0.04)
IMM GRANULOCYTES # BLD AUTO: 0 K/UL (ref 0–0.04)
IMM GRANULOCYTES NFR BLD AUTO: 0 % (ref 0–0.5)
IMM GRANULOCYTES NFR BLD AUTO: 0 % (ref 0–0.5)
LYMPHOCYTES # BLD AUTO: 0.5 K/UL (ref 1–4.8)
LYMPHOCYTES # BLD AUTO: 0.5 K/UL (ref 1–4.8)
LYMPHOCYTES NFR BLD: 98 % (ref 18–48)
LYMPHOCYTES NFR BLD: 98 % (ref 18–48)
MAGNESIUM SERPL-MCNC: 1.7 MG/DL (ref 1.6–2.6)
MCH RBC QN AUTO: 30.9 PG (ref 27–31)
MCH RBC QN AUTO: 30.9 PG (ref 27–31)
MCHC RBC AUTO-ENTMCNC: 34.5 G/DL (ref 32–36)
MCHC RBC AUTO-ENTMCNC: 34.5 G/DL (ref 32–36)
MCV RBC AUTO: 90 FL (ref 82–98)
MCV RBC AUTO: 90 FL (ref 82–98)
MONOCYTES # BLD AUTO: 0 K/UL (ref 0.3–1)
MONOCYTES # BLD AUTO: 0 K/UL (ref 0.3–1)
MONOCYTES NFR BLD: 0 % (ref 4–15)
MONOCYTES NFR BLD: 0 % (ref 4–15)
NEUTROPHILS # BLD AUTO: 0 K/UL (ref 1.8–7.7)
NEUTROPHILS # BLD AUTO: 0 K/UL (ref 1.8–7.7)
NEUTROPHILS NFR BLD: 2 % (ref 38–73)
NEUTROPHILS NFR BLD: 2 % (ref 38–73)
NRBC BLD-RTO: 0 /100 WBC
NRBC BLD-RTO: 0 /100 WBC
NUM UNITS TRANS WBC-POOR PLATPHERESIS: NORMAL
PHOSPHATE SERPL-MCNC: 3.7 MG/DL (ref 2.7–4.5)
PLATELET # BLD AUTO: 9 K/UL (ref 150–350)
PLATELET # BLD AUTO: 9 K/UL (ref 150–350)
PMV BLD AUTO: 12.8 FL (ref 9.2–12.9)
PMV BLD AUTO: 12.8 FL (ref 9.2–12.9)
POTASSIUM SERPL-SCNC: 5 MMOL/L (ref 3.5–5.1)
PROT SERPL-MCNC: 5 G/DL (ref 6–8.4)
RBC # BLD AUTO: 2.62 M/UL (ref 4–5.4)
RBC # BLD AUTO: 2.62 M/UL (ref 4–5.4)
SODIUM SERPL-SCNC: 139 MMOL/L (ref 136–145)
WBC # BLD AUTO: 0.5 K/UL (ref 3.9–12.7)
WBC # BLD AUTO: 0.5 K/UL (ref 3.9–12.7)

## 2021-02-06 PROCEDURE — 36430 TRANSFUSION BLD/BLD COMPNT: CPT

## 2021-02-06 PROCEDURE — 20600001 HC STEP DOWN PRIVATE ROOM

## 2021-02-06 PROCEDURE — P9037 PLATE PHERES LEUKOREDU IRRAD: HCPCS

## 2021-02-06 PROCEDURE — 83735 ASSAY OF MAGNESIUM: CPT

## 2021-02-06 PROCEDURE — 99233 SBSQ HOSP IP/OBS HIGH 50: CPT | Mod: GC,,, | Performed by: INTERNAL MEDICINE

## 2021-02-06 PROCEDURE — 86900 BLOOD TYPING SEROLOGIC ABO: CPT

## 2021-02-06 PROCEDURE — 25000003 PHARM REV CODE 250: Performed by: STUDENT IN AN ORGANIZED HEALTH CARE EDUCATION/TRAINING PROGRAM

## 2021-02-06 PROCEDURE — 84100 ASSAY OF PHOSPHORUS: CPT

## 2021-02-06 PROCEDURE — 99233 PR SUBSEQUENT HOSPITAL CARE,LEVL III: ICD-10-PCS | Mod: GC,,, | Performed by: INTERNAL MEDICINE

## 2021-02-06 PROCEDURE — 85025 COMPLETE CBC W/AUTO DIFF WBC: CPT

## 2021-02-06 PROCEDURE — 36415 COLL VENOUS BLD VENIPUNCTURE: CPT

## 2021-02-06 PROCEDURE — 25000003 PHARM REV CODE 250: Performed by: NURSE PRACTITIONER

## 2021-02-06 PROCEDURE — 25000003 PHARM REV CODE 250: Performed by: INTERNAL MEDICINE

## 2021-02-06 PROCEDURE — 80053 COMPREHEN METABOLIC PANEL: CPT

## 2021-02-06 PROCEDURE — 97116 GAIT TRAINING THERAPY: CPT | Mod: CQ

## 2021-02-06 PROCEDURE — 97530 THERAPEUTIC ACTIVITIES: CPT | Mod: CQ

## 2021-02-06 RX ORDER — LANOLIN ALCOHOL/MO/W.PET/CERES
400 CREAM (GRAM) TOPICAL 2 TIMES DAILY
Status: COMPLETED | OUTPATIENT
Start: 2021-02-06 | End: 2021-02-06

## 2021-02-06 RX ORDER — HYDROCODONE BITARTRATE AND ACETAMINOPHEN 500; 5 MG/1; MG/1
TABLET ORAL
Status: DISCONTINUED | OUTPATIENT
Start: 2021-02-06 | End: 2021-02-10 | Stop reason: HOSPADM

## 2021-02-06 RX ADMIN — ACYCLOVIR 400 MG: 200 CAPSULE ORAL at 08:02

## 2021-02-06 RX ADMIN — LEVOFLOXACIN 500 MG: 500 TABLET, FILM COATED ORAL at 09:02

## 2021-02-06 RX ADMIN — ACETAMINOPHEN 650 MG: 325 TABLET ORAL at 12:02

## 2021-02-06 RX ADMIN — ESCITALOPRAM OXALATE 20 MG: 20 TABLET ORAL at 08:02

## 2021-02-06 RX ADMIN — METHOCARBAMOL 500 MG: 500 TABLET ORAL at 07:02

## 2021-02-06 RX ADMIN — DIPHENHYDRAMINE HYDROCHLORIDE 25 MG: 25 CAPSULE ORAL at 12:02

## 2021-02-06 RX ADMIN — DOCUSATE SODIUM 50MG AND SENNOSIDES 8.6MG 1 TABLET: 8.6; 5 TABLET, FILM COATED ORAL at 09:02

## 2021-02-06 RX ADMIN — Medication 400 MG: at 12:02

## 2021-02-06 RX ADMIN — Medication 400 MG: at 08:02

## 2021-02-06 RX ADMIN — METOPROLOL SUCCINATE 25 MG: 25 TABLET, EXTENDED RELEASE ORAL at 09:02

## 2021-02-06 RX ADMIN — ACYCLOVIR 400 MG: 200 CAPSULE ORAL at 09:02

## 2021-02-06 RX ADMIN — FLUCONAZOLE 400 MG: 200 TABLET ORAL at 09:02

## 2021-02-06 RX ADMIN — FAMOTIDINE 20 MG: 20 TABLET, FILM COATED ORAL at 09:02

## 2021-02-06 RX ADMIN — PREGABALIN 75 MG: 75 CAPSULE ORAL at 08:02

## 2021-02-07 LAB
ALBUMIN SERPL BCP-MCNC: 2.6 G/DL (ref 3.5–5.2)
ALP SERPL-CCNC: 82 U/L (ref 55–135)
ALT SERPL W/O P-5'-P-CCNC: 10 U/L (ref 10–44)
ANION GAP SERPL CALC-SCNC: 6 MMOL/L (ref 8–16)
ANISOCYTOSIS BLD QL SMEAR: SLIGHT
ANISOCYTOSIS BLD QL SMEAR: SLIGHT
AST SERPL-CCNC: 14 U/L (ref 10–40)
BASOPHILS # BLD AUTO: 0 K/UL (ref 0–0.2)
BASOPHILS # BLD AUTO: 0 K/UL (ref 0–0.2)
BASOPHILS NFR BLD: 0 % (ref 0–1.9)
BASOPHILS NFR BLD: 0 % (ref 0–1.9)
BILIRUB SERPL-MCNC: 0.5 MG/DL (ref 0.1–1)
BUN SERPL-MCNC: 18 MG/DL (ref 8–23)
CALCIUM SERPL-MCNC: 8.6 MG/DL (ref 8.7–10.5)
CHLORIDE SERPL-SCNC: 105 MMOL/L (ref 95–110)
CO2 SERPL-SCNC: 27 MMOL/L (ref 23–29)
CREAT SERPL-MCNC: 0.8 MG/DL (ref 0.5–1.4)
DIFFERENTIAL METHOD: ABNORMAL
DIFFERENTIAL METHOD: ABNORMAL
EOSINOPHIL # BLD AUTO: 0 K/UL (ref 0–0.5)
EOSINOPHIL # BLD AUTO: 0 K/UL (ref 0–0.5)
EOSINOPHIL NFR BLD: 0 % (ref 0–8)
EOSINOPHIL NFR BLD: 0 % (ref 0–8)
ERYTHROCYTE [DISTWIDTH] IN BLOOD BY AUTOMATED COUNT: 14 % (ref 11.5–14.5)
ERYTHROCYTE [DISTWIDTH] IN BLOOD BY AUTOMATED COUNT: 14 % (ref 11.5–14.5)
EST. GFR  (AFRICAN AMERICAN): >60 ML/MIN/1.73 M^2
EST. GFR  (NON AFRICAN AMERICAN): >60 ML/MIN/1.73 M^2
GLUCOSE SERPL-MCNC: 99 MG/DL (ref 70–110)
HCT VFR BLD AUTO: 22.2 % (ref 37–48.5)
HCT VFR BLD AUTO: 22.2 % (ref 37–48.5)
HGB BLD-MCNC: 7.6 G/DL (ref 12–16)
HGB BLD-MCNC: 7.6 G/DL (ref 12–16)
HYPOCHROMIA BLD QL SMEAR: ABNORMAL
HYPOCHROMIA BLD QL SMEAR: ABNORMAL
IMM GRANULOCYTES # BLD AUTO: 0 K/UL (ref 0–0.04)
IMM GRANULOCYTES # BLD AUTO: 0 K/UL (ref 0–0.04)
IMM GRANULOCYTES NFR BLD AUTO: 0 % (ref 0–0.5)
IMM GRANULOCYTES NFR BLD AUTO: 0 % (ref 0–0.5)
LYMPHOCYTES # BLD AUTO: 0.4 K/UL (ref 1–4.8)
LYMPHOCYTES # BLD AUTO: 0.4 K/UL (ref 1–4.8)
LYMPHOCYTES NFR BLD: 95.7 % (ref 18–48)
LYMPHOCYTES NFR BLD: 95.7 % (ref 18–48)
MAGNESIUM SERPL-MCNC: 1.8 MG/DL (ref 1.6–2.6)
MCH RBC QN AUTO: 30.5 PG (ref 27–31)
MCH RBC QN AUTO: 30.5 PG (ref 27–31)
MCHC RBC AUTO-ENTMCNC: 34.2 G/DL (ref 32–36)
MCHC RBC AUTO-ENTMCNC: 34.2 G/DL (ref 32–36)
MCV RBC AUTO: 89 FL (ref 82–98)
MCV RBC AUTO: 89 FL (ref 82–98)
MONOCYTES # BLD AUTO: 0 K/UL (ref 0.3–1)
MONOCYTES # BLD AUTO: 0 K/UL (ref 0.3–1)
MONOCYTES NFR BLD: 2.2 % (ref 4–15)
MONOCYTES NFR BLD: 2.2 % (ref 4–15)
NEUTROPHILS # BLD AUTO: 0 K/UL (ref 1.8–7.7)
NEUTROPHILS # BLD AUTO: 0 K/UL (ref 1.8–7.7)
NEUTROPHILS NFR BLD: 2.1 % (ref 38–73)
NEUTROPHILS NFR BLD: 2.1 % (ref 38–73)
NRBC BLD-RTO: 0 /100 WBC
NRBC BLD-RTO: 0 /100 WBC
OVALOCYTES BLD QL SMEAR: ABNORMAL
OVALOCYTES BLD QL SMEAR: ABNORMAL
PHOSPHATE SERPL-MCNC: 4 MG/DL (ref 2.7–4.5)
PLATELET # BLD AUTO: 43 K/UL (ref 150–350)
PLATELET # BLD AUTO: 43 K/UL (ref 150–350)
PMV BLD AUTO: 11.9 FL (ref 9.2–12.9)
PMV BLD AUTO: 11.9 FL (ref 9.2–12.9)
POIKILOCYTOSIS BLD QL SMEAR: SLIGHT
POIKILOCYTOSIS BLD QL SMEAR: SLIGHT
POLYCHROMASIA BLD QL SMEAR: ABNORMAL
POLYCHROMASIA BLD QL SMEAR: ABNORMAL
POTASSIUM SERPL-SCNC: 4.4 MMOL/L (ref 3.5–5.1)
PROT SERPL-MCNC: 5 G/DL (ref 6–8.4)
RBC # BLD AUTO: 2.49 M/UL (ref 4–5.4)
RBC # BLD AUTO: 2.49 M/UL (ref 4–5.4)
SODIUM SERPL-SCNC: 138 MMOL/L (ref 136–145)
WBC # BLD AUTO: 0.46 K/UL (ref 3.9–12.7)
WBC # BLD AUTO: 0.46 K/UL (ref 3.9–12.7)

## 2021-02-07 PROCEDURE — 25000003 PHARM REV CODE 250: Performed by: INTERNAL MEDICINE

## 2021-02-07 PROCEDURE — 99233 PR SUBSEQUENT HOSPITAL CARE,LEVL III: ICD-10-PCS | Mod: GC,,, | Performed by: INTERNAL MEDICINE

## 2021-02-07 PROCEDURE — 20600001 HC STEP DOWN PRIVATE ROOM

## 2021-02-07 PROCEDURE — 85025 COMPLETE CBC W/AUTO DIFF WBC: CPT

## 2021-02-07 PROCEDURE — 36415 COLL VENOUS BLD VENIPUNCTURE: CPT

## 2021-02-07 PROCEDURE — 83735 ASSAY OF MAGNESIUM: CPT

## 2021-02-07 PROCEDURE — 80053 COMPREHEN METABOLIC PANEL: CPT

## 2021-02-07 PROCEDURE — 84100 ASSAY OF PHOSPHORUS: CPT

## 2021-02-07 PROCEDURE — 25000003 PHARM REV CODE 250: Performed by: STUDENT IN AN ORGANIZED HEALTH CARE EDUCATION/TRAINING PROGRAM

## 2021-02-07 PROCEDURE — 99233 SBSQ HOSP IP/OBS HIGH 50: CPT | Mod: GC,,, | Performed by: INTERNAL MEDICINE

## 2021-02-07 RX ORDER — AMOXICILLIN 250 MG
1 CAPSULE ORAL ONCE
Status: COMPLETED | OUTPATIENT
Start: 2021-02-07 | End: 2021-02-07

## 2021-02-07 RX ORDER — POLYETHYLENE GLYCOL 3350 17 G/17G
17 POWDER, FOR SOLUTION ORAL DAILY
Status: DISCONTINUED | OUTPATIENT
Start: 2021-02-07 | End: 2021-02-10 | Stop reason: HOSPADM

## 2021-02-07 RX ADMIN — FAMOTIDINE 20 MG: 20 TABLET, FILM COATED ORAL at 09:02

## 2021-02-07 RX ADMIN — DOCUSATE SODIUM 50MG AND SENNOSIDES 8.6MG 1 TABLET: 8.6; 5 TABLET, FILM COATED ORAL at 09:02

## 2021-02-07 RX ADMIN — LEVOFLOXACIN 500 MG: 500 TABLET, FILM COATED ORAL at 09:02

## 2021-02-07 RX ADMIN — ACYCLOVIR 400 MG: 200 CAPSULE ORAL at 09:02

## 2021-02-07 RX ADMIN — FLUCONAZOLE 400 MG: 200 TABLET ORAL at 09:02

## 2021-02-07 RX ADMIN — METHOCARBAMOL 500 MG: 500 TABLET ORAL at 08:02

## 2021-02-07 RX ADMIN — METOPROLOL SUCCINATE 25 MG: 25 TABLET, EXTENDED RELEASE ORAL at 12:02

## 2021-02-07 RX ADMIN — POLYETHYLENE GLYCOL 3350 17 G: 17 POWDER, FOR SOLUTION ORAL at 12:02

## 2021-02-07 RX ADMIN — ESCITALOPRAM OXALATE 20 MG: 20 TABLET ORAL at 08:02

## 2021-02-07 RX ADMIN — DOCUSATE SODIUM 50MG AND SENNOSIDES 8.6MG 1 TABLET: 8.6; 5 TABLET, FILM COATED ORAL at 12:02

## 2021-02-07 RX ADMIN — ACYCLOVIR 400 MG: 200 CAPSULE ORAL at 08:02

## 2021-02-07 RX ADMIN — PREGABALIN 75 MG: 75 CAPSULE ORAL at 08:02

## 2021-02-08 LAB
ALBUMIN SERPL BCP-MCNC: 2.7 G/DL (ref 3.5–5.2)
ALP SERPL-CCNC: 87 U/L (ref 55–135)
ALT SERPL W/O P-5'-P-CCNC: 14 U/L (ref 10–44)
ANION GAP SERPL CALC-SCNC: 9 MMOL/L (ref 8–16)
ANISOCYTOSIS BLD QL SMEAR: SLIGHT
ANISOCYTOSIS BLD QL SMEAR: SLIGHT
AST SERPL-CCNC: 19 U/L (ref 10–40)
BASOPHILS # BLD AUTO: 0 K/UL (ref 0–0.2)
BASOPHILS # BLD AUTO: 0 K/UL (ref 0–0.2)
BASOPHILS NFR BLD: 0 % (ref 0–1.9)
BASOPHILS NFR BLD: 0 % (ref 0–1.9)
BILIRUB SERPL-MCNC: 0.5 MG/DL (ref 0.1–1)
BUN SERPL-MCNC: 18 MG/DL (ref 8–23)
CALCIUM SERPL-MCNC: 8.6 MG/DL (ref 8.7–10.5)
CHLORIDE SERPL-SCNC: 103 MMOL/L (ref 95–110)
CO2 SERPL-SCNC: 25 MMOL/L (ref 23–29)
CREAT SERPL-MCNC: 0.8 MG/DL (ref 0.5–1.4)
DIFFERENTIAL METHOD: ABNORMAL
DIFFERENTIAL METHOD: ABNORMAL
EOSINOPHIL # BLD AUTO: 0 K/UL (ref 0–0.5)
EOSINOPHIL # BLD AUTO: 0 K/UL (ref 0–0.5)
EOSINOPHIL NFR BLD: 0 % (ref 0–8)
EOSINOPHIL NFR BLD: 0 % (ref 0–8)
ERYTHROCYTE [DISTWIDTH] IN BLOOD BY AUTOMATED COUNT: 14 % (ref 11.5–14.5)
ERYTHROCYTE [DISTWIDTH] IN BLOOD BY AUTOMATED COUNT: 14 % (ref 11.5–14.5)
EST. GFR  (AFRICAN AMERICAN): >60 ML/MIN/1.73 M^2
EST. GFR  (NON AFRICAN AMERICAN): >60 ML/MIN/1.73 M^2
GLUCOSE SERPL-MCNC: 99 MG/DL (ref 70–110)
HCT VFR BLD AUTO: 23 % (ref 37–48.5)
HCT VFR BLD AUTO: 23 % (ref 37–48.5)
HGB BLD-MCNC: 7.8 G/DL (ref 12–16)
HGB BLD-MCNC: 7.8 G/DL (ref 12–16)
IMM GRANULOCYTES # BLD AUTO: 0 K/UL (ref 0–0.04)
IMM GRANULOCYTES # BLD AUTO: 0 K/UL (ref 0–0.04)
IMM GRANULOCYTES NFR BLD AUTO: 0 % (ref 0–0.5)
IMM GRANULOCYTES NFR BLD AUTO: 0 % (ref 0–0.5)
LYMPHOCYTES # BLD AUTO: 0.4 K/UL (ref 1–4.8)
LYMPHOCYTES # BLD AUTO: 0.4 K/UL (ref 1–4.8)
LYMPHOCYTES NFR BLD: 97.7 % (ref 18–48)
LYMPHOCYTES NFR BLD: 97.7 % (ref 18–48)
MAGNESIUM SERPL-MCNC: 1.8 MG/DL (ref 1.6–2.6)
MCH RBC QN AUTO: 30.6 PG (ref 27–31)
MCH RBC QN AUTO: 30.6 PG (ref 27–31)
MCHC RBC AUTO-ENTMCNC: 33.9 G/DL (ref 32–36)
MCHC RBC AUTO-ENTMCNC: 33.9 G/DL (ref 32–36)
MCV RBC AUTO: 90 FL (ref 82–98)
MCV RBC AUTO: 90 FL (ref 82–98)
MONOCYTES # BLD AUTO: 0 K/UL (ref 0.3–1)
MONOCYTES # BLD AUTO: 0 K/UL (ref 0.3–1)
MONOCYTES NFR BLD: 0 % (ref 4–15)
MONOCYTES NFR BLD: 0 % (ref 4–15)
NEUTROPHILS # BLD AUTO: 0 K/UL (ref 1.8–7.7)
NEUTROPHILS # BLD AUTO: 0 K/UL (ref 1.8–7.7)
NEUTROPHILS NFR BLD: 2.3 % (ref 38–73)
NEUTROPHILS NFR BLD: 2.3 % (ref 38–73)
NRBC BLD-RTO: 0 /100 WBC
NRBC BLD-RTO: 0 /100 WBC
OVALOCYTES BLD QL SMEAR: ABNORMAL
OVALOCYTES BLD QL SMEAR: ABNORMAL
PHOSPHATE SERPL-MCNC: 4.3 MG/DL (ref 2.7–4.5)
PLATELET # BLD AUTO: 36 K/UL (ref 150–350)
PLATELET # BLD AUTO: 36 K/UL (ref 150–350)
PLATELET BLD QL SMEAR: ABNORMAL
PLATELET BLD QL SMEAR: ABNORMAL
PMV BLD AUTO: 12.3 FL (ref 9.2–12.9)
PMV BLD AUTO: 12.3 FL (ref 9.2–12.9)
POIKILOCYTOSIS BLD QL SMEAR: SLIGHT
POIKILOCYTOSIS BLD QL SMEAR: SLIGHT
POTASSIUM SERPL-SCNC: 4.4 MMOL/L (ref 3.5–5.1)
PROT SERPL-MCNC: 5.3 G/DL (ref 6–8.4)
RBC # BLD AUTO: 2.55 M/UL (ref 4–5.4)
RBC # BLD AUTO: 2.55 M/UL (ref 4–5.4)
SARS-COV-2 RDRP RESP QL NAA+PROBE: NEGATIVE
SODIUM SERPL-SCNC: 137 MMOL/L (ref 136–145)
WBC # BLD AUTO: 0.43 K/UL (ref 3.9–12.7)
WBC # BLD AUTO: 0.43 K/UL (ref 3.9–12.7)

## 2021-02-08 PROCEDURE — 25000003 PHARM REV CODE 250: Performed by: NURSE PRACTITIONER

## 2021-02-08 PROCEDURE — 99233 PR SUBSEQUENT HOSPITAL CARE,LEVL III: ICD-10-PCS | Mod: ,,, | Performed by: INTERNAL MEDICINE

## 2021-02-08 PROCEDURE — 25000003 PHARM REV CODE 250: Performed by: STUDENT IN AN ORGANIZED HEALTH CARE EDUCATION/TRAINING PROGRAM

## 2021-02-08 PROCEDURE — 25000003 PHARM REV CODE 250: Performed by: INTERNAL MEDICINE

## 2021-02-08 PROCEDURE — 97530 THERAPEUTIC ACTIVITIES: CPT | Mod: CQ

## 2021-02-08 PROCEDURE — 99232 PR SUBSEQUENT HOSPITAL CARE,LEVL II: ICD-10-PCS | Mod: ,,, | Performed by: NURSE PRACTITIONER

## 2021-02-08 PROCEDURE — 97535 SELF CARE MNGMENT TRAINING: CPT

## 2021-02-08 PROCEDURE — 83735 ASSAY OF MAGNESIUM: CPT

## 2021-02-08 PROCEDURE — 20600001 HC STEP DOWN PRIVATE ROOM

## 2021-02-08 PROCEDURE — 99233 SBSQ HOSP IP/OBS HIGH 50: CPT | Mod: ,,, | Performed by: INTERNAL MEDICINE

## 2021-02-08 PROCEDURE — U0002 COVID-19 LAB TEST NON-CDC: HCPCS

## 2021-02-08 PROCEDURE — 36415 COLL VENOUS BLD VENIPUNCTURE: CPT

## 2021-02-08 PROCEDURE — 84100 ASSAY OF PHOSPHORUS: CPT

## 2021-02-08 PROCEDURE — 63600175 PHARM REV CODE 636 W HCPCS: Performed by: INTERNAL MEDICINE

## 2021-02-08 PROCEDURE — 80053 COMPREHEN METABOLIC PANEL: CPT

## 2021-02-08 PROCEDURE — 85025 COMPLETE CBC W/AUTO DIFF WBC: CPT

## 2021-02-08 PROCEDURE — 97116 GAIT TRAINING THERAPY: CPT | Mod: CQ

## 2021-02-08 PROCEDURE — 99232 SBSQ HOSP IP/OBS MODERATE 35: CPT | Mod: ,,, | Performed by: NURSE PRACTITIONER

## 2021-02-08 RX ORDER — LACTULOSE 10 G/15ML
15 SOLUTION ORAL 3 TIMES DAILY PRN
Status: DISCONTINUED | OUTPATIENT
Start: 2021-02-08 | End: 2021-02-10 | Stop reason: HOSPADM

## 2021-02-08 RX ORDER — LACTULOSE 10 G/15ML
15 SOLUTION ORAL 3 TIMES DAILY
Status: DISCONTINUED | OUTPATIENT
Start: 2021-02-08 | End: 2021-02-08

## 2021-02-08 RX ORDER — ALPRAZOLAM 1 MG/1
2 TABLET ORAL DAILY PRN
Status: DISCONTINUED | OUTPATIENT
Start: 2021-02-08 | End: 2021-02-10 | Stop reason: HOSPADM

## 2021-02-08 RX ADMIN — ALPRAZOLAM 2 MG: 1 TABLET ORAL at 02:02

## 2021-02-08 RX ADMIN — ACYCLOVIR 400 MG: 200 CAPSULE ORAL at 10:02

## 2021-02-08 RX ADMIN — LEVOFLOXACIN 500 MG: 500 TABLET, FILM COATED ORAL at 10:02

## 2021-02-08 RX ADMIN — ONDANSETRON 8 MG: 2 INJECTION INTRAMUSCULAR; INTRAVENOUS at 10:02

## 2021-02-08 RX ADMIN — ESCITALOPRAM OXALATE 20 MG: 20 TABLET ORAL at 08:02

## 2021-02-08 RX ADMIN — ACYCLOVIR 400 MG: 200 CAPSULE ORAL at 08:02

## 2021-02-08 RX ADMIN — FAMOTIDINE 20 MG: 20 TABLET, FILM COATED ORAL at 10:02

## 2021-02-08 RX ADMIN — METOPROLOL SUCCINATE 25 MG: 25 TABLET, EXTENDED RELEASE ORAL at 10:02

## 2021-02-08 RX ADMIN — DOCUSATE SODIUM 50MG AND SENNOSIDES 8.6MG 1 TABLET: 8.6; 5 TABLET, FILM COATED ORAL at 10:02

## 2021-02-08 RX ADMIN — POLYETHYLENE GLYCOL 3350 17 G: 17 POWDER, FOR SOLUTION ORAL at 10:02

## 2021-02-08 RX ADMIN — LACTULOSE 15 G: 20 SOLUTION ORAL at 01:02

## 2021-02-08 RX ADMIN — FLUCONAZOLE 400 MG: 200 TABLET ORAL at 10:02

## 2021-02-08 RX ADMIN — PREGABALIN 75 MG: 75 CAPSULE ORAL at 08:02

## 2021-02-09 LAB
ABO + RH BLD: NORMAL
ALBUMIN SERPL BCP-MCNC: 2.6 G/DL (ref 3.5–5.2)
ALP SERPL-CCNC: 83 U/L (ref 55–135)
ALT SERPL W/O P-5'-P-CCNC: 16 U/L (ref 10–44)
ANION GAP SERPL CALC-SCNC: 7 MMOL/L (ref 8–16)
ANISOCYTOSIS BLD QL SMEAR: SLIGHT
ANISOCYTOSIS BLD QL SMEAR: SLIGHT
AST SERPL-CCNC: 18 U/L (ref 10–40)
BASOPHILS # BLD AUTO: 0 K/UL (ref 0–0.2)
BASOPHILS # BLD AUTO: 0 K/UL (ref 0–0.2)
BASOPHILS NFR BLD: 0 % (ref 0–1.9)
BASOPHILS NFR BLD: 0 % (ref 0–1.9)
BILIRUB SERPL-MCNC: 0.6 MG/DL (ref 0.1–1)
BLD GP AB SCN CELLS X3 SERPL QL: NORMAL
BUN SERPL-MCNC: 17 MG/DL (ref 8–23)
BURR CELLS BLD QL SMEAR: ABNORMAL
BURR CELLS BLD QL SMEAR: ABNORMAL
CALCIUM SERPL-MCNC: 8.7 MG/DL (ref 8.7–10.5)
CHLORIDE SERPL-SCNC: 105 MMOL/L (ref 95–110)
CO2 SERPL-SCNC: 25 MMOL/L (ref 23–29)
CREAT SERPL-MCNC: 0.8 MG/DL (ref 0.5–1.4)
DACRYOCYTES BLD QL SMEAR: ABNORMAL
DACRYOCYTES BLD QL SMEAR: ABNORMAL
DIFFERENTIAL METHOD: ABNORMAL
DIFFERENTIAL METHOD: ABNORMAL
EOSINOPHIL # BLD AUTO: 0 K/UL (ref 0–0.5)
EOSINOPHIL # BLD AUTO: 0 K/UL (ref 0–0.5)
EOSINOPHIL NFR BLD: 0 % (ref 0–8)
EOSINOPHIL NFR BLD: 0 % (ref 0–8)
ERYTHROCYTE [DISTWIDTH] IN BLOOD BY AUTOMATED COUNT: 13.8 % (ref 11.5–14.5)
ERYTHROCYTE [DISTWIDTH] IN BLOOD BY AUTOMATED COUNT: 13.8 % (ref 11.5–14.5)
EST. GFR  (AFRICAN AMERICAN): >60 ML/MIN/1.73 M^2
EST. GFR  (NON AFRICAN AMERICAN): >60 ML/MIN/1.73 M^2
GLUCOSE SERPL-MCNC: 93 MG/DL (ref 70–110)
HCT VFR BLD AUTO: 23.4 % (ref 37–48.5)
HCT VFR BLD AUTO: 23.4 % (ref 37–48.5)
HGB BLD-MCNC: 7.8 G/DL (ref 12–16)
HGB BLD-MCNC: 7.8 G/DL (ref 12–16)
HYPOCHROMIA BLD QL SMEAR: ABNORMAL
HYPOCHROMIA BLD QL SMEAR: ABNORMAL
IMM GRANULOCYTES # BLD AUTO: 0 K/UL (ref 0–0.04)
IMM GRANULOCYTES # BLD AUTO: 0 K/UL (ref 0–0.04)
IMM GRANULOCYTES NFR BLD AUTO: 0 % (ref 0–0.5)
IMM GRANULOCYTES NFR BLD AUTO: 0 % (ref 0–0.5)
LYMPHOCYTES # BLD AUTO: 0.4 K/UL (ref 1–4.8)
LYMPHOCYTES # BLD AUTO: 0.4 K/UL (ref 1–4.8)
LYMPHOCYTES NFR BLD: 97.3 % (ref 18–48)
LYMPHOCYTES NFR BLD: 97.3 % (ref 18–48)
MAGNESIUM SERPL-MCNC: 1.9 MG/DL (ref 1.6–2.6)
MCH RBC QN AUTO: 30.2 PG (ref 27–31)
MCH RBC QN AUTO: 30.2 PG (ref 27–31)
MCHC RBC AUTO-ENTMCNC: 33.3 G/DL (ref 32–36)
MCHC RBC AUTO-ENTMCNC: 33.3 G/DL (ref 32–36)
MCV RBC AUTO: 91 FL (ref 82–98)
MCV RBC AUTO: 91 FL (ref 82–98)
MONOCYTES # BLD AUTO: 0 K/UL (ref 0.3–1)
MONOCYTES # BLD AUTO: 0 K/UL (ref 0.3–1)
MONOCYTES NFR BLD: 0 % (ref 4–15)
MONOCYTES NFR BLD: 0 % (ref 4–15)
NEUTROPHILS # BLD AUTO: 0 K/UL (ref 1.8–7.7)
NEUTROPHILS # BLD AUTO: 0 K/UL (ref 1.8–7.7)
NEUTROPHILS NFR BLD: 2.7 % (ref 38–73)
NEUTROPHILS NFR BLD: 2.7 % (ref 38–73)
NRBC BLD-RTO: 0 /100 WBC
NRBC BLD-RTO: 0 /100 WBC
OVALOCYTES BLD QL SMEAR: ABNORMAL
OVALOCYTES BLD QL SMEAR: ABNORMAL
PHOSPHATE SERPL-MCNC: 4.2 MG/DL (ref 2.7–4.5)
PLATELET # BLD AUTO: 30 K/UL (ref 150–350)
PLATELET # BLD AUTO: 30 K/UL (ref 150–350)
PLATELET BLD QL SMEAR: ABNORMAL
PLATELET BLD QL SMEAR: ABNORMAL
PMV BLD AUTO: 12.6 FL (ref 9.2–12.9)
PMV BLD AUTO: 12.6 FL (ref 9.2–12.9)
POIKILOCYTOSIS BLD QL SMEAR: SLIGHT
POIKILOCYTOSIS BLD QL SMEAR: SLIGHT
POLYCHROMASIA BLD QL SMEAR: ABNORMAL
POLYCHROMASIA BLD QL SMEAR: ABNORMAL
POTASSIUM SERPL-SCNC: 4.6 MMOL/L (ref 3.5–5.1)
PROT SERPL-MCNC: 5.1 G/DL (ref 6–8.4)
RBC # BLD AUTO: 2.58 M/UL (ref 4–5.4)
RBC # BLD AUTO: 2.58 M/UL (ref 4–5.4)
SODIUM SERPL-SCNC: 137 MMOL/L (ref 136–145)
WBC # BLD AUTO: 0.37 K/UL (ref 3.9–12.7)
WBC # BLD AUTO: 0.37 K/UL (ref 3.9–12.7)

## 2021-02-09 PROCEDURE — 88305 TISSUE EXAM BY PATHOLOGIST: CPT | Mod: 26,,, | Performed by: PATHOLOGY

## 2021-02-09 PROCEDURE — 99233 PR SUBSEQUENT HOSPITAL CARE,LEVL III: ICD-10-PCS | Mod: ,,, | Performed by: INTERNAL MEDICINE

## 2021-02-09 PROCEDURE — 88237 TISSUE CULTURE BONE MARROW: CPT

## 2021-02-09 PROCEDURE — 88312 SPECIAL STAINS GROUP 1: CPT | Mod: 26,,, | Performed by: PATHOLOGY

## 2021-02-09 PROCEDURE — 88264 CHROMOSOME ANALYSIS 20-25: CPT

## 2021-02-09 PROCEDURE — 81310 NPM1 GENE: CPT

## 2021-02-09 PROCEDURE — 88311 DECALCIFY TISSUE: CPT | Mod: 26,,, | Performed by: PATHOLOGY

## 2021-02-09 PROCEDURE — 84100 ASSAY OF PHOSPHORUS: CPT

## 2021-02-09 PROCEDURE — 85097 PR  BONE MARROW,SMEAR INTERPRETATION: ICD-10-PCS | Mod: ,,, | Performed by: PATHOLOGY

## 2021-02-09 PROCEDURE — 25000003 PHARM REV CODE 250: Performed by: NURSE PRACTITIONER

## 2021-02-09 PROCEDURE — 88313 PR  SPECIAL STAINS,GROUP II: ICD-10-PCS | Mod: 26,,, | Performed by: PATHOLOGY

## 2021-02-09 PROCEDURE — 88341 IMHCHEM/IMCYTCHM EA ADD ANTB: CPT | Mod: 26,59,, | Performed by: PATHOLOGY

## 2021-02-09 PROCEDURE — 20600001 HC STEP DOWN PRIVATE ROOM

## 2021-02-09 PROCEDURE — 25000003 PHARM REV CODE 250: Performed by: STUDENT IN AN ORGANIZED HEALTH CARE EDUCATION/TRAINING PROGRAM

## 2021-02-09 PROCEDURE — 88275 CYTOGENETICS 100-300: CPT

## 2021-02-09 PROCEDURE — 88189 PR  FLOWCYTOMETRY/READ, 16 & > MARKERS: ICD-10-PCS | Mod: ,,, | Performed by: PATHOLOGY

## 2021-02-09 PROCEDURE — 81450 HL NEO GSAP 5-50DNA/DNA&RNA: CPT

## 2021-02-09 PROCEDURE — 88313 SPECIAL STAINS GROUP 2: CPT | Mod: 26,,, | Performed by: PATHOLOGY

## 2021-02-09 PROCEDURE — 83735 ASSAY OF MAGNESIUM: CPT

## 2021-02-09 PROCEDURE — 88312 SPECIAL STAINS GROUP 1: CPT | Performed by: PATHOLOGY

## 2021-02-09 PROCEDURE — 25000003 PHARM REV CODE 250: Performed by: INTERNAL MEDICINE

## 2021-02-09 PROCEDURE — 88271 CYTOGENETICS DNA PROBE: CPT | Mod: 59

## 2021-02-09 PROCEDURE — 99233 SBSQ HOSP IP/OBS HIGH 50: CPT | Mod: ,,, | Performed by: INTERNAL MEDICINE

## 2021-02-09 PROCEDURE — 88311 DECALCIFY TISSUE: CPT | Performed by: PATHOLOGY

## 2021-02-09 PROCEDURE — 85025 COMPLETE CBC W/AUTO DIFF WBC: CPT

## 2021-02-09 PROCEDURE — 88305 TISSUE EXAM BY PATHOLOGIST: CPT | Performed by: PATHOLOGY

## 2021-02-09 PROCEDURE — 88341 PR IHC OR ICC EACH ADD'L SINGLE ANTIBODY  STAINPR: ICD-10-PCS | Mod: 26,59,, | Performed by: PATHOLOGY

## 2021-02-09 PROCEDURE — 88305 TISSUE EXAM BY PATHOLOGIST: ICD-10-PCS | Mod: 26,,, | Performed by: PATHOLOGY

## 2021-02-09 PROCEDURE — 38222 PR BONE MARROW BIOPSY(IES) W/ASPIRATION(S); DIAGNOSTIC: ICD-10-PCS | Mod: LT,,, | Performed by: NURSE PRACTITIONER

## 2021-02-09 PROCEDURE — 38221 DX BONE MARROW BIOPSIES: CPT

## 2021-02-09 PROCEDURE — 30000890 MAYO MISCELLANEOUS TEST (REFLEX)

## 2021-02-09 PROCEDURE — 88185 FLOWCYTOMETRY/TC ADD-ON: CPT | Mod: 59 | Performed by: PATHOLOGY

## 2021-02-09 PROCEDURE — 88189 FLOWCYTOMETRY/READ 16 & >: CPT | Mod: ,,, | Performed by: PATHOLOGY

## 2021-02-09 PROCEDURE — 88313 SPECIAL STAINS GROUP 2: CPT | Mod: 59 | Performed by: PATHOLOGY

## 2021-02-09 PROCEDURE — 85097 BONE MARROW INTERPRETATION: CPT | Mod: ,,, | Performed by: PATHOLOGY

## 2021-02-09 PROCEDURE — 38222 DX BONE MARROW BX & ASPIR: CPT | Mod: LT,,, | Performed by: NURSE PRACTITIONER

## 2021-02-09 PROCEDURE — 86900 BLOOD TYPING SEROLOGIC ABO: CPT

## 2021-02-09 PROCEDURE — 88184 FLOWCYTOMETRY/ TC 1 MARKER: CPT | Performed by: PATHOLOGY

## 2021-02-09 PROCEDURE — 80053 COMPREHEN METABOLIC PANEL: CPT

## 2021-02-09 PROCEDURE — 88312 PR  SPECIAL STAINS,GROUP I: ICD-10-PCS | Mod: 26,,, | Performed by: PATHOLOGY

## 2021-02-09 PROCEDURE — 63600175 PHARM REV CODE 636 W HCPCS: Performed by: NURSE PRACTITIONER

## 2021-02-09 PROCEDURE — 88342 IMHCHEM/IMCYTCHM 1ST ANTB: CPT | Performed by: PATHOLOGY

## 2021-02-09 PROCEDURE — 88311 PR  DECALCIFY TISSUE: ICD-10-PCS | Mod: 26,,, | Performed by: PATHOLOGY

## 2021-02-09 PROCEDURE — 81245 FLT3 GENE: CPT

## 2021-02-09 PROCEDURE — 88341 IMHCHEM/IMCYTCHM EA ADD ANTB: CPT | Mod: 59 | Performed by: PATHOLOGY

## 2021-02-09 PROCEDURE — 88342 IMHCHEM/IMCYTCHM 1ST ANTB: CPT | Mod: 26,59,, | Performed by: PATHOLOGY

## 2021-02-09 PROCEDURE — 88342 CHG IMMUNOCYTOCHEMISTRY: ICD-10-PCS | Mod: 26,59,, | Performed by: PATHOLOGY

## 2021-02-09 RX ORDER — LIDOCAINE HYDROCHLORIDE 20 MG/ML
10 INJECTION, SOLUTION INFILTRATION; PERINEURAL ONCE
Status: DISCONTINUED | OUTPATIENT
Start: 2021-02-09 | End: 2021-02-09

## 2021-02-09 RX ORDER — LIDOCAINE HYDROCHLORIDE 20 MG/ML
10 INJECTION, SOLUTION EPIDURAL; INFILTRATION; INTRACAUDAL; PERINEURAL ONCE
Status: COMPLETED | OUTPATIENT
Start: 2021-02-09 | End: 2021-02-09

## 2021-02-09 RX ORDER — LORAZEPAM 2 MG/ML
0.5 INJECTION INTRAMUSCULAR ONCE AS NEEDED
Status: COMPLETED | OUTPATIENT
Start: 2021-02-09 | End: 2021-02-09

## 2021-02-09 RX ADMIN — ACYCLOVIR 400 MG: 200 CAPSULE ORAL at 08:02

## 2021-02-09 RX ADMIN — LIDOCAINE HYDROCHLORIDE 200 MG: 20 INJECTION, SOLUTION EPIDURAL; INFILTRATION; INTRACAUDAL; PERINEURAL at 11:02

## 2021-02-09 RX ADMIN — FLUCONAZOLE 400 MG: 200 TABLET ORAL at 08:02

## 2021-02-09 RX ADMIN — ALPRAZOLAM 2 MG: 1 TABLET ORAL at 02:02

## 2021-02-09 RX ADMIN — LEVOFLOXACIN 500 MG: 500 TABLET, FILM COATED ORAL at 08:02

## 2021-02-09 RX ADMIN — FAMOTIDINE 20 MG: 20 TABLET, FILM COATED ORAL at 08:02

## 2021-02-09 RX ADMIN — ESCITALOPRAM OXALATE 20 MG: 20 TABLET ORAL at 08:02

## 2021-02-09 RX ADMIN — DOCUSATE SODIUM 50MG AND SENNOSIDES 8.6MG 1 TABLET: 8.6; 5 TABLET, FILM COATED ORAL at 08:02

## 2021-02-09 RX ADMIN — PREGABALIN 75 MG: 75 CAPSULE ORAL at 08:02

## 2021-02-09 RX ADMIN — LORAZEPAM 0.5 MG: 2 INJECTION INTRAMUSCULAR; INTRAVENOUS at 11:02

## 2021-02-09 RX ADMIN — POLYETHYLENE GLYCOL 3350 17 G: 17 POWDER, FOR SOLUTION ORAL at 08:02

## 2021-02-10 VITALS
TEMPERATURE: 99 F | BODY MASS INDEX: 30.14 KG/M2 | DIASTOLIC BLOOD PRESSURE: 55 MMHG | HEIGHT: 64 IN | HEART RATE: 78 BPM | WEIGHT: 176.56 LBS | OXYGEN SATURATION: 94 % | RESPIRATION RATE: 16 BRPM | SYSTOLIC BLOOD PRESSURE: 94 MMHG

## 2021-02-10 LAB
ALBUMIN SERPL BCP-MCNC: 2.6 G/DL (ref 3.5–5.2)
ALP SERPL-CCNC: 87 U/L (ref 55–135)
ALT SERPL W/O P-5'-P-CCNC: 14 U/L (ref 10–44)
ANION GAP SERPL CALC-SCNC: 8 MMOL/L (ref 8–16)
ANISOCYTOSIS BLD QL SMEAR: SLIGHT
ANISOCYTOSIS BLD QL SMEAR: SLIGHT
AST SERPL-CCNC: 14 U/L (ref 10–40)
BASOPHILS # BLD AUTO: 0 K/UL (ref 0–0.2)
BASOPHILS # BLD AUTO: 0 K/UL (ref 0–0.2)
BASOPHILS NFR BLD: 0 % (ref 0–1.9)
BASOPHILS NFR BLD: 0 % (ref 0–1.9)
BILIRUB SERPL-MCNC: 0.4 MG/DL (ref 0.1–1)
BUN SERPL-MCNC: 15 MG/DL (ref 8–23)
CALCIUM SERPL-MCNC: 8.7 MG/DL (ref 8.7–10.5)
CHLORIDE SERPL-SCNC: 104 MMOL/L (ref 95–110)
CO2 SERPL-SCNC: 25 MMOL/L (ref 23–29)
CREAT SERPL-MCNC: 0.8 MG/DL (ref 0.5–1.4)
DIFFERENTIAL METHOD: ABNORMAL
DIFFERENTIAL METHOD: ABNORMAL
EOSINOPHIL # BLD AUTO: 0 K/UL (ref 0–0.5)
EOSINOPHIL # BLD AUTO: 0 K/UL (ref 0–0.5)
EOSINOPHIL NFR BLD: 0 % (ref 0–8)
EOSINOPHIL NFR BLD: 0 % (ref 0–8)
ERYTHROCYTE [DISTWIDTH] IN BLOOD BY AUTOMATED COUNT: 13.7 % (ref 11.5–14.5)
ERYTHROCYTE [DISTWIDTH] IN BLOOD BY AUTOMATED COUNT: 13.7 % (ref 11.5–14.5)
EST. GFR  (AFRICAN AMERICAN): >60 ML/MIN/1.73 M^2
EST. GFR  (NON AFRICAN AMERICAN): >60 ML/MIN/1.73 M^2
FINAL PATHOLOGIC DIAGNOSIS: NORMAL
GLUCOSE SERPL-MCNC: 101 MG/DL (ref 70–110)
GROSS: NORMAL
HCT VFR BLD AUTO: 22.8 % (ref 37–48.5)
HCT VFR BLD AUTO: 22.8 % (ref 37–48.5)
HGB BLD-MCNC: 7.5 G/DL (ref 12–16)
HGB BLD-MCNC: 7.5 G/DL (ref 12–16)
IMM GRANULOCYTES # BLD AUTO: 0 K/UL (ref 0–0.04)
IMM GRANULOCYTES # BLD AUTO: 0 K/UL (ref 0–0.04)
IMM GRANULOCYTES NFR BLD AUTO: 0 % (ref 0–0.5)
IMM GRANULOCYTES NFR BLD AUTO: 0 % (ref 0–0.5)
LYMPHOCYTES # BLD AUTO: 0.4 K/UL (ref 1–4.8)
LYMPHOCYTES # BLD AUTO: 0.4 K/UL (ref 1–4.8)
LYMPHOCYTES NFR BLD: 95.2 % (ref 18–48)
LYMPHOCYTES NFR BLD: 95.2 % (ref 18–48)
Lab: NORMAL
MAGNESIUM SERPL-MCNC: 1.9 MG/DL (ref 1.6–2.6)
MCH RBC QN AUTO: 30.1 PG (ref 27–31)
MCH RBC QN AUTO: 30.1 PG (ref 27–31)
MCHC RBC AUTO-ENTMCNC: 32.9 G/DL (ref 32–36)
MCHC RBC AUTO-ENTMCNC: 32.9 G/DL (ref 32–36)
MCV RBC AUTO: 92 FL (ref 82–98)
MCV RBC AUTO: 92 FL (ref 82–98)
MONOCYTES # BLD AUTO: 0 K/UL (ref 0.3–1)
MONOCYTES # BLD AUTO: 0 K/UL (ref 0.3–1)
MONOCYTES NFR BLD: 2.4 % (ref 4–15)
MONOCYTES NFR BLD: 2.4 % (ref 4–15)
NEUTROPHILS # BLD AUTO: 0 K/UL (ref 1.8–7.7)
NEUTROPHILS # BLD AUTO: 0 K/UL (ref 1.8–7.7)
NEUTROPHILS NFR BLD: 2.4 % (ref 38–73)
NEUTROPHILS NFR BLD: 2.4 % (ref 38–73)
NRBC BLD-RTO: 0 /100 WBC
NRBC BLD-RTO: 0 /100 WBC
PHOSPHATE SERPL-MCNC: 4.6 MG/DL (ref 2.7–4.5)
PLATELET # BLD AUTO: 24 K/UL (ref 150–350)
PLATELET # BLD AUTO: 24 K/UL (ref 150–350)
PLATELET BLD QL SMEAR: ABNORMAL
PLATELET BLD QL SMEAR: ABNORMAL
PMV BLD AUTO: 12 FL (ref 9.2–12.9)
PMV BLD AUTO: 12 FL (ref 9.2–12.9)
POTASSIUM SERPL-SCNC: 4.1 MMOL/L (ref 3.5–5.1)
PROT SERPL-MCNC: 5.1 G/DL (ref 6–8.4)
RBC # BLD AUTO: 2.49 M/UL (ref 4–5.4)
RBC # BLD AUTO: 2.49 M/UL (ref 4–5.4)
SODIUM SERPL-SCNC: 137 MMOL/L (ref 136–145)
WBC # BLD AUTO: 0.42 K/UL (ref 3.9–12.7)
WBC # BLD AUTO: 0.42 K/UL (ref 3.9–12.7)

## 2021-02-10 PROCEDURE — 83735 ASSAY OF MAGNESIUM: CPT

## 2021-02-10 PROCEDURE — 97535 SELF CARE MNGMENT TRAINING: CPT

## 2021-02-10 PROCEDURE — 99238 HOSP IP/OBS DSCHRG MGMT 30/<: CPT | Mod: ,,, | Performed by: INTERNAL MEDICINE

## 2021-02-10 PROCEDURE — 85025 COMPLETE CBC W/AUTO DIFF WBC: CPT

## 2021-02-10 PROCEDURE — 84100 ASSAY OF PHOSPHORUS: CPT

## 2021-02-10 PROCEDURE — 25000003 PHARM REV CODE 250: Performed by: STUDENT IN AN ORGANIZED HEALTH CARE EDUCATION/TRAINING PROGRAM

## 2021-02-10 PROCEDURE — 25000003 PHARM REV CODE 250: Performed by: NURSE PRACTITIONER

## 2021-02-10 PROCEDURE — 36415 COLL VENOUS BLD VENIPUNCTURE: CPT

## 2021-02-10 PROCEDURE — 99238 PR HOSPITAL DISCHARGE DAY,<30 MIN: ICD-10-PCS | Mod: ,,, | Performed by: INTERNAL MEDICINE

## 2021-02-10 PROCEDURE — 25000003 PHARM REV CODE 250: Performed by: INTERNAL MEDICINE

## 2021-02-10 PROCEDURE — 99499 UNLISTED E&M SERVICE: CPT | Mod: ,,, | Performed by: PHYSICAL MEDICINE & REHABILITATION

## 2021-02-10 PROCEDURE — 80053 COMPREHEN METABOLIC PANEL: CPT

## 2021-02-10 PROCEDURE — 99499 NO LOS: ICD-10-PCS | Mod: ,,, | Performed by: PHYSICAL MEDICINE & REHABILITATION

## 2021-02-10 RX ORDER — AMOXICILLIN 250 MG
1 CAPSULE ORAL DAILY
Status: CANCELLED | OUTPATIENT
Start: 2021-02-11

## 2021-02-10 RX ORDER — ESCITALOPRAM OXALATE 20 MG/1
20 TABLET ORAL NIGHTLY
Status: CANCELLED | OUTPATIENT
Start: 2021-02-10

## 2021-02-10 RX ORDER — FLUCONAZOLE 200 MG/1
400 TABLET ORAL DAILY
Status: CANCELLED | OUTPATIENT
Start: 2021-02-11

## 2021-02-10 RX ORDER — LORAZEPAM 1 MG/1
1 TABLET ORAL 2 TIMES DAILY
Status: CANCELLED | OUTPATIENT
Start: 2021-02-10

## 2021-02-10 RX ORDER — ONDANSETRON 2 MG/ML
8 INJECTION INTRAMUSCULAR; INTRAVENOUS EVERY 8 HOURS PRN
Status: CANCELLED | OUTPATIENT
Start: 2021-02-10

## 2021-02-10 RX ORDER — OXYCODONE HYDROCHLORIDE 5 MG/1
5 TABLET ORAL EVERY 6 HOURS PRN
Status: CANCELLED | OUTPATIENT
Start: 2021-02-10

## 2021-02-10 RX ORDER — DIPHENHYDRAMINE HCL 25 MG
25 CAPSULE ORAL EVERY 6 HOURS PRN
Status: CANCELLED | OUTPATIENT
Start: 2021-02-10

## 2021-02-10 RX ORDER — FAMOTIDINE 20 MG/1
20 TABLET, FILM COATED ORAL DAILY
Status: CANCELLED | OUTPATIENT
Start: 2021-02-11

## 2021-02-10 RX ORDER — ACETAMINOPHEN 325 MG/1
650 TABLET ORAL EVERY 6 HOURS PRN
Status: CANCELLED | OUTPATIENT
Start: 2021-02-10

## 2021-02-10 RX ORDER — LEVOFLOXACIN 500 MG/1
500 TABLET, FILM COATED ORAL DAILY
Status: CANCELLED | OUTPATIENT
Start: 2021-02-11

## 2021-02-10 RX ORDER — ALPRAZOLAM 1 MG/1
2 TABLET ORAL DAILY PRN
Status: CANCELLED | OUTPATIENT
Start: 2021-02-10

## 2021-02-10 RX ORDER — PROCHLORPERAZINE MALEATE 5 MG
10 TABLET ORAL EVERY 6 HOURS PRN
Status: CANCELLED | OUTPATIENT
Start: 2021-02-10

## 2021-02-10 RX ORDER — LACTULOSE 10 G/15ML
15 SOLUTION ORAL 3 TIMES DAILY PRN
Status: CANCELLED | OUTPATIENT
Start: 2021-02-10

## 2021-02-10 RX ORDER — METHOCARBAMOL 500 MG/1
500 TABLET, FILM COATED ORAL 4 TIMES DAILY PRN
Status: CANCELLED | OUTPATIENT
Start: 2021-02-10

## 2021-02-10 RX ORDER — ONDANSETRON 4 MG/1
4 TABLET, FILM COATED ORAL EVERY 12 HOURS PRN
Status: CANCELLED | OUTPATIENT
Start: 2021-02-10

## 2021-02-10 RX ORDER — POLYETHYLENE GLYCOL 3350 17 G/17G
17 POWDER, FOR SOLUTION ORAL DAILY
Refills: 0
Start: 2021-02-11 | End: 2023-08-17

## 2021-02-10 RX ORDER — PANTOPRAZOLE SODIUM 40 MG/1
40 TABLET, DELAYED RELEASE ORAL DAILY
Refills: 2 | Status: CANCELLED | OUTPATIENT
Start: 2021-02-11

## 2021-02-10 RX ORDER — ACYCLOVIR 200 MG/1
400 CAPSULE ORAL 2 TIMES DAILY
Status: CANCELLED | OUTPATIENT
Start: 2021-02-10

## 2021-02-10 RX ORDER — POLYETHYLENE GLYCOL 3350 17 G/17G
17 POWDER, FOR SOLUTION ORAL DAILY
Status: CANCELLED | OUTPATIENT
Start: 2021-02-11

## 2021-02-10 RX ORDER — OXYCODONE HYDROCHLORIDE 10 MG/1
10 TABLET ORAL EVERY 6 HOURS PRN
Status: CANCELLED | OUTPATIENT
Start: 2021-02-10

## 2021-02-10 RX ORDER — FLUCONAZOLE 200 MG/1
400 TABLET ORAL DAILY
Status: CANCELLED | OUTPATIENT
Start: 2021-02-10

## 2021-02-10 RX ORDER — LACTULOSE 10 G/15ML
15 SOLUTION ORAL 3 TIMES DAILY PRN
Start: 2021-02-10 | End: 2023-08-17

## 2021-02-10 RX ORDER — PREGABALIN 75 MG/1
75 CAPSULE ORAL NIGHTLY
Qty: 30 CAPSULE | Refills: 6
Start: 2021-02-10 | End: 2023-03-14

## 2021-02-10 RX ORDER — PREGABALIN 75 MG/1
75 CAPSULE ORAL NIGHTLY
Status: CANCELLED | OUTPATIENT
Start: 2021-02-10

## 2021-02-10 RX ORDER — ALPRAZOLAM 1 MG/1
2 TABLET ORAL NIGHTLY PRN
Status: CANCELLED | OUTPATIENT
Start: 2021-02-10

## 2021-02-10 RX ORDER — METOPROLOL SUCCINATE 25 MG/1
25 TABLET, EXTENDED RELEASE ORAL DAILY
Status: CANCELLED | OUTPATIENT
Start: 2021-02-11

## 2021-02-10 RX ORDER — LEVOFLOXACIN 500 MG/1
500 TABLET, FILM COATED ORAL DAILY
Status: CANCELLED | OUTPATIENT
Start: 2021-02-10

## 2021-02-10 RX ORDER — METOPROLOL SUCCINATE 25 MG/1
25 TABLET, EXTENDED RELEASE ORAL DAILY
Status: CANCELLED | OUTPATIENT
Start: 2021-02-10

## 2021-02-10 RX ORDER — AMOXICILLIN 250 MG
1 CAPSULE ORAL DAILY
Start: 2021-02-11 | End: 2023-08-17

## 2021-02-10 RX ADMIN — ACYCLOVIR 400 MG: 200 CAPSULE ORAL at 08:02

## 2021-02-10 RX ADMIN — LEVOFLOXACIN 500 MG: 500 TABLET, FILM COATED ORAL at 08:02

## 2021-02-10 RX ADMIN — DOCUSATE SODIUM 50MG AND SENNOSIDES 8.6MG 1 TABLET: 8.6; 5 TABLET, FILM COATED ORAL at 08:02

## 2021-02-10 RX ADMIN — FLUCONAZOLE 400 MG: 200 TABLET ORAL at 08:02

## 2021-02-10 RX ADMIN — POLYETHYLENE GLYCOL 3350 17 G: 17 POWDER, FOR SOLUTION ORAL at 08:02

## 2021-02-10 RX ADMIN — METOPROLOL SUCCINATE 25 MG: 25 TABLET, EXTENDED RELEASE ORAL at 08:02

## 2021-02-10 RX ADMIN — FAMOTIDINE 20 MG: 20 TABLET, FILM COATED ORAL at 08:02

## 2021-02-10 RX ADMIN — METHOCARBAMOL 500 MG: 500 TABLET ORAL at 11:02

## 2021-02-12 LAB
BODY SITE - BONE MARROW: NORMAL
CLINICAL DIAGNOSIS - BONE MARROW: NORMAL
FLOW CYTOMETRY ANTIBODIES ANALYZED - BONE MARROW: NORMAL
FLOW CYTOMETRY COMMENT - BONE MARROW: NORMAL
FLOW CYTOMETRY INTERPRETATION - BONE MARROW: NORMAL
MAYO MISCELLANEOUS RESULT (REF): NORMAL

## 2021-02-15 ENCOUNTER — OFFICE VISIT (OUTPATIENT)
Dept: HEMATOLOGY/ONCOLOGY | Facility: CLINIC | Age: 71
End: 2021-02-15
Payer: MEDICARE

## 2021-02-15 DIAGNOSIS — D61.810 PANCYTOPENIA DUE TO CHEMOTHERAPY: ICD-10-CM

## 2021-02-15 DIAGNOSIS — C92.02 AML (ACUTE MYELOID LEUKEMIA) IN RELAPSE: Primary | ICD-10-CM

## 2021-02-15 DIAGNOSIS — S72.001A CLOSED DISPLACED FRACTURE OF RIGHT FEMORAL NECK: ICD-10-CM

## 2021-02-15 DIAGNOSIS — D64.9 SYMPTOMATIC ANEMIA: ICD-10-CM

## 2021-02-15 LAB
FLT3 RESULT: NORMAL
PATH REPORT.FINAL DX SPEC: NORMAL
SPECIMEN TYPE: NORMAL

## 2021-02-15 PROCEDURE — 99215 PR OFFICE/OUTPT VISIT, EST, LEVL V, 40-54 MIN: ICD-10-PCS | Mod: 95,,, | Performed by: INTERNAL MEDICINE

## 2021-02-15 PROCEDURE — 99215 OFFICE O/P EST HI 40 MIN: CPT | Mod: 95,,, | Performed by: INTERNAL MEDICINE

## 2021-02-18 ENCOUNTER — TELEPHONE (OUTPATIENT)
Dept: ORTHOPEDICS | Facility: CLINIC | Age: 71
End: 2021-02-18

## 2021-02-18 DIAGNOSIS — D61.810 PANCYTOPENIA DUE TO CHEMOTHERAPY: ICD-10-CM

## 2021-02-18 DIAGNOSIS — C92.02 ACUTE MYELOID LEUKEMIA IN RELAPSE: Primary | ICD-10-CM

## 2021-02-19 ENCOUNTER — TELEPHONE (OUTPATIENT)
Dept: HEMATOLOGY/ONCOLOGY | Facility: CLINIC | Age: 71
End: 2021-02-19

## 2021-02-22 ENCOUNTER — LAB VISIT (OUTPATIENT)
Dept: LAB | Facility: HOSPITAL | Age: 71
End: 2021-02-22
Attending: INTERNAL MEDICINE
Payer: MEDICARE

## 2021-02-22 DIAGNOSIS — D61.810 PANCYTOPENIA DUE TO CHEMOTHERAPY: ICD-10-CM

## 2021-02-22 LAB
ABO + RH BLD: NORMAL
ALBUMIN SERPL BCP-MCNC: 3.6 G/DL (ref 3.5–5.2)
ALP SERPL-CCNC: 143 U/L (ref 38–145)
ALT SERPL W/O P-5'-P-CCNC: 13 U/L (ref 0–35)
ANION GAP SERPL CALC-SCNC: 10 MMOL/L (ref 8–16)
ANISOCYTOSIS BLD QL SMEAR: ABNORMAL
AST SERPL-CCNC: 27 U/L (ref 14–36)
BASOPHILS # BLD AUTO: ABNORMAL K/UL (ref 0–0.2)
BASOPHILS NFR BLD: 0 % (ref 0–1.9)
BILIRUB SERPL-MCNC: 0.7 MG/DL (ref 0.2–1.3)
BLD GP AB SCN CELLS X3 SERPL QL: NORMAL
CALCIUM SERPL-MCNC: 9.5 MG/DL (ref 8.4–10.2)
CHLORIDE SERPL-SCNC: 101 MMOL/L (ref 95–110)
CO2 SERPL-SCNC: 24 MMOL/L (ref 22–31)
CREAT SERPL-MCNC: 0.88 MG/DL (ref 0.5–1.4)
DIFFERENTIAL METHOD: ABNORMAL
EOSINOPHIL # BLD AUTO: ABNORMAL K/UL (ref 0–0.5)
EOSINOPHIL NFR BLD: 0 % (ref 0–8)
ERYTHROCYTE [DISTWIDTH] IN BLOOD BY AUTOMATED COUNT: 14.2 % (ref 11.5–14.5)
EST. GFR  (AFRICAN AMERICAN): >60 ML/MIN/1.73 M^2
EST. GFR  (NON AFRICAN AMERICAN): >60 ML/MIN/1.73 M^2
GLUCOSE SERPL-MCNC: 120 MG/DL (ref 70–110)
HCT VFR BLD AUTO: 22.9 % (ref 37–48.5)
HGB BLD-MCNC: 7.4 G/DL (ref 12–16)
HYPOCHROMIA BLD QL SMEAR: ABNORMAL
IMM GRANULOCYTES # BLD AUTO: ABNORMAL K/UL (ref 0–0.04)
IMM GRANULOCYTES NFR BLD AUTO: ABNORMAL % (ref 0–0.5)
LYMPHOCYTES # BLD AUTO: ABNORMAL K/UL (ref 1–4.8)
LYMPHOCYTES NFR BLD: 53 % (ref 18–48)
MCH RBC QN AUTO: 29.7 PG (ref 27–31)
MCHC RBC AUTO-ENTMCNC: 32.3 G/DL (ref 32–36)
MCV RBC AUTO: 92 FL (ref 82–98)
MONOCYTES # BLD AUTO: ABNORMAL K/UL (ref 0.3–1)
MONOCYTES NFR BLD: 7 % (ref 4–15)
NEUTROPHILS # BLD AUTO: 0.5 K/UL (ref 1.8–7.7)
NEUTROPHILS NFR BLD: 34 % (ref 38–73)
NEUTS BAND NFR BLD MANUAL: 6 %
NRBC BLD-RTO: 2 /100 WBC
PLATELET # BLD AUTO: 26 K/UL (ref 150–350)
PLATELET BLD QL SMEAR: ABNORMAL
PMV BLD AUTO: 10.8 FL (ref 9.2–12.9)
POLYCHROMASIA BLD QL SMEAR: ABNORMAL
POTASSIUM SERPL-SCNC: 4.3 MMOL/L (ref 3.5–5.1)
PROT SERPL-MCNC: 6.3 G/DL (ref 6–8.4)
RBC # BLD AUTO: 2.49 M/UL (ref 4–5.4)
SODIUM SERPL-SCNC: 135 MMOL/L (ref 136–145)
TOXIC GRANULES BLD QL SMEAR: PRESENT
UUN UR-MCNC: 11 MG/DL (ref 7–18)
WBC # BLD AUTO: 1.22 K/UL (ref 3.9–12.7)

## 2021-02-22 PROCEDURE — 85007 BL SMEAR W/DIFF WBC COUNT: CPT | Mod: PN

## 2021-02-22 PROCEDURE — 85027 COMPLETE CBC AUTOMATED: CPT

## 2021-02-22 PROCEDURE — 86900 BLOOD TYPING SEROLOGIC ABO: CPT

## 2021-02-22 PROCEDURE — 85007 BL SMEAR W/DIFF WBC COUNT: CPT

## 2021-02-22 PROCEDURE — 85027 COMPLETE CBC AUTOMATED: CPT | Mod: PN

## 2021-02-22 PROCEDURE — 86900 BLOOD TYPING SEROLOGIC ABO: CPT | Mod: PN

## 2021-02-22 PROCEDURE — 80053 COMPREHEN METABOLIC PANEL: CPT | Mod: PN

## 2021-02-22 PROCEDURE — 80053 COMPREHEN METABOLIC PANEL: CPT

## 2021-02-23 ENCOUNTER — PATIENT MESSAGE (OUTPATIENT)
Dept: RHEUMATOLOGY | Facility: CLINIC | Age: 71
End: 2021-02-23

## 2021-02-24 PROBLEM — C92.02 AML (ACUTE MYELOID LEUKEMIA) IN RELAPSE: Status: ACTIVE | Noted: 2021-02-24

## 2021-03-01 ENCOUNTER — LAB VISIT (OUTPATIENT)
Dept: LAB | Facility: HOSPITAL | Age: 71
End: 2021-03-01
Payer: MEDICARE

## 2021-03-01 ENCOUNTER — OFFICE VISIT (OUTPATIENT)
Dept: HEMATOLOGY/ONCOLOGY | Facility: CLINIC | Age: 71
End: 2021-03-01
Payer: MEDICARE

## 2021-03-01 ENCOUNTER — INFUSION (OUTPATIENT)
Dept: INFUSION THERAPY | Facility: HOSPITAL | Age: 71
End: 2021-03-01
Payer: MEDICARE

## 2021-03-01 VITALS
TEMPERATURE: 98 F | SYSTOLIC BLOOD PRESSURE: 111 MMHG | OXYGEN SATURATION: 97 % | RESPIRATION RATE: 18 BRPM | DIASTOLIC BLOOD PRESSURE: 53 MMHG | HEART RATE: 80 BPM

## 2021-03-01 VITALS
BODY MASS INDEX: 29.3 KG/M2 | OXYGEN SATURATION: 99 % | SYSTOLIC BLOOD PRESSURE: 94 MMHG | HEART RATE: 89 BPM | RESPIRATION RATE: 16 BRPM | WEIGHT: 171.63 LBS | DIASTOLIC BLOOD PRESSURE: 56 MMHG | HEIGHT: 64 IN

## 2021-03-01 DIAGNOSIS — D64.9 SYMPTOMATIC ANEMIA: ICD-10-CM

## 2021-03-01 DIAGNOSIS — C92.02 AML (ACUTE MYELOID LEUKEMIA) IN RELAPSE: ICD-10-CM

## 2021-03-01 DIAGNOSIS — D64.81 ANEMIA ASSOCIATED WITH CHEMOTHERAPY: ICD-10-CM

## 2021-03-01 DIAGNOSIS — D61.810 PANCYTOPENIA DUE TO CHEMOTHERAPY: ICD-10-CM

## 2021-03-01 DIAGNOSIS — C92.02 ACUTE MYELOID LEUKEMIA IN RELAPSE: ICD-10-CM

## 2021-03-01 DIAGNOSIS — T45.1X5A ANEMIA ASSOCIATED WITH CHEMOTHERAPY: ICD-10-CM

## 2021-03-01 DIAGNOSIS — Z86.79 HISTORY OF CARDIOMYOPATHY: ICD-10-CM

## 2021-03-01 DIAGNOSIS — D64.81 ANEMIA ASSOCIATED WITH CHEMOTHERAPY: Primary | ICD-10-CM

## 2021-03-01 DIAGNOSIS — T45.1X5A ANEMIA ASSOCIATED WITH CHEMOTHERAPY: Primary | ICD-10-CM

## 2021-03-01 LAB
ABO + RH BLD: NORMAL
ALBUMIN SERPL BCP-MCNC: 2.8 G/DL (ref 3.5–5.2)
ALP SERPL-CCNC: 122 U/L (ref 55–135)
ALT SERPL W/O P-5'-P-CCNC: 8 U/L (ref 10–44)
ANION GAP SERPL CALC-SCNC: 10 MMOL/L (ref 8–16)
AST SERPL-CCNC: 20 U/L (ref 10–40)
BASOPHILS # BLD AUTO: 0 K/UL (ref 0–0.2)
BASOPHILS NFR BLD: 0 % (ref 0–1.9)
BILIRUB SERPL-MCNC: 0.3 MG/DL (ref 0.1–1)
BLD GP AB SCN CELLS X3 SERPL QL: NORMAL
BLD PROD TYP BPU: NORMAL
BLOOD UNIT EXPIRATION DATE: NORMAL
BLOOD UNIT TYPE CODE: 6200
BLOOD UNIT TYPE: NORMAL
BUN SERPL-MCNC: 11 MG/DL (ref 8–23)
CALCIUM SERPL-MCNC: 9.1 MG/DL (ref 8.7–10.5)
CHLORIDE SERPL-SCNC: 107 MMOL/L (ref 95–110)
CO2 SERPL-SCNC: 22 MMOL/L (ref 23–29)
CODING SYSTEM: NORMAL
CREAT SERPL-MCNC: 0.9 MG/DL (ref 0.5–1.4)
DIFFERENTIAL METHOD: ABNORMAL
DISPENSE STATUS: NORMAL
EOSINOPHIL # BLD AUTO: 0 K/UL (ref 0–0.5)
EOSINOPHIL NFR BLD: 0 % (ref 0–8)
ERYTHROCYTE [DISTWIDTH] IN BLOOD BY AUTOMATED COUNT: 15.5 % (ref 11.5–14.5)
EST. GFR  (AFRICAN AMERICAN): >60 ML/MIN/1.73 M^2
EST. GFR  (NON AFRICAN AMERICAN): >60 ML/MIN/1.73 M^2
GLUCOSE SERPL-MCNC: 112 MG/DL (ref 70–110)
HCT VFR BLD AUTO: 20 % (ref 37–48.5)
HGB BLD-MCNC: 6.7 G/DL (ref 12–16)
IMM GRANULOCYTES # BLD AUTO: 0.02 K/UL (ref 0–0.04)
IMM GRANULOCYTES NFR BLD AUTO: 1 % (ref 0–0.5)
LYMPHOCYTES # BLD AUTO: 0.7 K/UL (ref 1–4.8)
LYMPHOCYTES NFR BLD: 35 % (ref 18–48)
MCH RBC QN AUTO: 31.8 PG (ref 27–31)
MCHC RBC AUTO-ENTMCNC: 33.5 G/DL (ref 32–36)
MCV RBC AUTO: 95 FL (ref 82–98)
MONOCYTES # BLD AUTO: 0.4 K/UL (ref 0.3–1)
MONOCYTES NFR BLD: 19.9 % (ref 4–15)
NEUTROPHILS # BLD AUTO: 0.9 K/UL (ref 1.8–7.7)
NEUTROPHILS NFR BLD: 44.1 % (ref 38–73)
NRBC BLD-RTO: 1 /100 WBC
NUM UNITS TRANS PACKED RBC: NORMAL
PLATELET # BLD AUTO: 28 K/UL (ref 150–350)
PLATELET BLD QL SMEAR: ABNORMAL
PMV BLD AUTO: 11.4 FL (ref 9.2–12.9)
POTASSIUM SERPL-SCNC: 4.3 MMOL/L (ref 3.5–5.1)
PROT SERPL-MCNC: 6 G/DL (ref 6–8.4)
RBC # BLD AUTO: 2.11 M/UL (ref 4–5.4)
SODIUM SERPL-SCNC: 139 MMOL/L (ref 136–145)
WBC # BLD AUTO: 2.06 K/UL (ref 3.9–12.7)

## 2021-03-01 PROCEDURE — 99214 OFFICE O/P EST MOD 30 MIN: CPT | Mod: PBBFAC,25 | Performed by: INTERNAL MEDICINE

## 2021-03-01 PROCEDURE — 36415 COLL VENOUS BLD VENIPUNCTURE: CPT

## 2021-03-01 PROCEDURE — 25000003 PHARM REV CODE 250: Performed by: NURSE PRACTITIONER

## 2021-03-01 PROCEDURE — 85025 COMPLETE CBC W/AUTO DIFF WBC: CPT

## 2021-03-01 PROCEDURE — 86900 BLOOD TYPING SEROLOGIC ABO: CPT

## 2021-03-01 PROCEDURE — 80053 COMPREHEN METABOLIC PANEL: CPT

## 2021-03-01 PROCEDURE — 36430 TRANSFUSION BLD/BLD COMPNT: CPT

## 2021-03-01 PROCEDURE — P9040 RBC LEUKOREDUCED IRRADIATED: HCPCS

## 2021-03-01 PROCEDURE — 99999 PR PBB SHADOW E&M-EST. PATIENT-LVL IV: CPT | Mod: PBBFAC,,, | Performed by: INTERNAL MEDICINE

## 2021-03-01 PROCEDURE — 99215 OFFICE O/P EST HI 40 MIN: CPT | Mod: S$PBB,,, | Performed by: INTERNAL MEDICINE

## 2021-03-01 PROCEDURE — 99999 PR PBB SHADOW E&M-EST. PATIENT-LVL IV: ICD-10-PCS | Mod: PBBFAC,,, | Performed by: INTERNAL MEDICINE

## 2021-03-01 PROCEDURE — 99215 PR OFFICE/OUTPT VISIT, EST, LEVL V, 40-54 MIN: ICD-10-PCS | Mod: S$PBB,,, | Performed by: INTERNAL MEDICINE

## 2021-03-01 PROCEDURE — 86920 COMPATIBILITY TEST SPIN: CPT

## 2021-03-01 RX ORDER — HYDROCODONE BITARTRATE AND ACETAMINOPHEN 500; 5 MG/1; MG/1
TABLET ORAL ONCE
Status: COMPLETED | OUTPATIENT
Start: 2021-03-01 | End: 2021-03-01

## 2021-03-01 RX ORDER — ACETAMINOPHEN 325 MG/1
650 TABLET ORAL
Status: COMPLETED | OUTPATIENT
Start: 2021-03-01 | End: 2021-03-01

## 2021-03-01 RX ORDER — HYDROCODONE BITARTRATE AND ACETAMINOPHEN 500; 5 MG/1; MG/1
TABLET ORAL ONCE
Status: CANCELLED | OUTPATIENT
Start: 2021-03-01 | End: 2021-03-01

## 2021-03-01 RX ORDER — ACETAMINOPHEN 325 MG/1
650 TABLET ORAL
Status: CANCELLED | OUTPATIENT
Start: 2021-03-01

## 2021-03-01 RX ADMIN — SODIUM CHLORIDE: 0.9 INJECTION, SOLUTION INTRAVENOUS at 02:03

## 2021-03-01 RX ADMIN — ACETAMINOPHEN 650 MG: 325 TABLET ORAL at 02:03

## 2021-03-05 ENCOUNTER — PATIENT MESSAGE (OUTPATIENT)
Dept: HEMATOLOGY/ONCOLOGY | Facility: CLINIC | Age: 71
End: 2021-03-05

## 2021-03-05 DIAGNOSIS — D64.81 ANEMIA ASSOCIATED WITH CHEMOTHERAPY: Primary | ICD-10-CM

## 2021-03-05 DIAGNOSIS — T45.1X5A ANEMIA ASSOCIATED WITH CHEMOTHERAPY: Primary | ICD-10-CM

## 2021-03-05 DIAGNOSIS — C92.02 AML (ACUTE MYELOID LEUKEMIA) IN RELAPSE: ICD-10-CM

## 2021-03-09 ENCOUNTER — LAB VISIT (OUTPATIENT)
Dept: FAMILY MEDICINE | Facility: CLINIC | Age: 71
End: 2021-03-09
Payer: MEDICARE

## 2021-03-09 DIAGNOSIS — T45.1X5A ANEMIA ASSOCIATED WITH CHEMOTHERAPY: ICD-10-CM

## 2021-03-09 DIAGNOSIS — C92.02 AML (ACUTE MYELOID LEUKEMIA) IN RELAPSE: ICD-10-CM

## 2021-03-09 DIAGNOSIS — D64.81 ANEMIA ASSOCIATED WITH CHEMOTHERAPY: ICD-10-CM

## 2021-03-09 PROCEDURE — 85025 COMPLETE CBC W/AUTO DIFF WBC: CPT | Performed by: INTERNAL MEDICINE

## 2021-03-09 PROCEDURE — 36415 PR COLLECTION VENOUS BLOOD,VENIPUNCTURE: ICD-10-PCS | Mod: S$GLB,,, | Performed by: INTERNAL MEDICINE

## 2021-03-09 PROCEDURE — 80053 COMPREHEN METABOLIC PANEL: CPT | Performed by: INTERNAL MEDICINE

## 2021-03-09 PROCEDURE — 36415 COLL VENOUS BLD VENIPUNCTURE: CPT | Mod: S$GLB,,, | Performed by: INTERNAL MEDICINE

## 2021-03-10 LAB
ALBUMIN SERPL BCP-MCNC: 2.9 G/DL (ref 3.5–5.2)
ALP SERPL-CCNC: 84 U/L (ref 55–135)
ALT SERPL W/O P-5'-P-CCNC: 6 U/L (ref 10–44)
ANION GAP SERPL CALC-SCNC: 8 MMOL/L (ref 8–16)
ANISOCYTOSIS BLD QL SMEAR: SLIGHT
AST SERPL-CCNC: 15 U/L (ref 10–40)
BASOPHILS # BLD AUTO: 0 K/UL (ref 0–0.2)
BASOPHILS NFR BLD: 0 % (ref 0–1.9)
BILIRUB SERPL-MCNC: 0.3 MG/DL (ref 0.1–1)
BUN SERPL-MCNC: 9 MG/DL (ref 8–23)
CALCIUM SERPL-MCNC: 9.2 MG/DL (ref 8.7–10.5)
CHLORIDE SERPL-SCNC: 105 MMOL/L (ref 95–110)
CO2 SERPL-SCNC: 27 MMOL/L (ref 23–29)
CREAT SERPL-MCNC: 0.8 MG/DL (ref 0.5–1.4)
DIFFERENTIAL METHOD: ABNORMAL
EOSINOPHIL # BLD AUTO: 0 K/UL (ref 0–0.5)
EOSINOPHIL NFR BLD: 0.3 % (ref 0–8)
ERYTHROCYTE [DISTWIDTH] IN BLOOD BY AUTOMATED COUNT: 17.2 % (ref 11.5–14.5)
EST. GFR  (AFRICAN AMERICAN): >60 ML/MIN/1.73 M^2
EST. GFR  (NON AFRICAN AMERICAN): >60 ML/MIN/1.73 M^2
GLUCOSE SERPL-MCNC: 96 MG/DL (ref 70–110)
HCT VFR BLD AUTO: 23.1 % (ref 37–48.5)
HGB BLD-MCNC: 7.6 G/DL (ref 12–16)
HYPOCHROMIA BLD QL SMEAR: ABNORMAL
IMM GRANULOCYTES # BLD AUTO: 0.01 K/UL (ref 0–0.04)
IMM GRANULOCYTES NFR BLD AUTO: 0.3 % (ref 0–0.5)
LYMPHOCYTES # BLD AUTO: 0.8 K/UL (ref 1–4.8)
LYMPHOCYTES NFR BLD: 26.6 % (ref 18–48)
MCH RBC QN AUTO: 31.7 PG (ref 27–31)
MCHC RBC AUTO-ENTMCNC: 32.9 G/DL (ref 32–36)
MCV RBC AUTO: 96 FL (ref 82–98)
MONOCYTES # BLD AUTO: 0.9 K/UL (ref 0.3–1)
MONOCYTES NFR BLD: 27.6 % (ref 4–15)
NEUTROPHILS # BLD AUTO: 1.4 K/UL (ref 1.8–7.7)
NEUTROPHILS NFR BLD: 45.2 % (ref 38–73)
NRBC BLD-RTO: 1 /100 WBC
OVALOCYTES BLD QL SMEAR: ABNORMAL
PLATELET # BLD AUTO: 46 K/UL (ref 150–350)
PLATELET BLD QL SMEAR: ABNORMAL
PMV BLD AUTO: 12.1 FL (ref 9.2–12.9)
POIKILOCYTOSIS BLD QL SMEAR: SLIGHT
POLYCHROMASIA BLD QL SMEAR: ABNORMAL
POTASSIUM SERPL-SCNC: 3.8 MMOL/L (ref 3.5–5.1)
PROT SERPL-MCNC: 5.6 G/DL (ref 6–8.4)
RBC # BLD AUTO: 2.4 M/UL (ref 4–5.4)
SODIUM SERPL-SCNC: 140 MMOL/L (ref 136–145)
WBC # BLD AUTO: 3.12 K/UL (ref 3.9–12.7)

## 2021-03-15 ENCOUNTER — LAB VISIT (OUTPATIENT)
Dept: FAMILY MEDICINE | Facility: CLINIC | Age: 71
End: 2021-03-15
Attending: INTERNAL MEDICINE
Payer: MEDICARE

## 2021-03-15 DIAGNOSIS — C92.02 ACUTE MYELOID LEUKEMIA IN RELAPSE: ICD-10-CM

## 2021-03-15 DIAGNOSIS — C92.00 AML (ACUTE MYELOBLASTIC LEUKEMIA): ICD-10-CM

## 2021-03-15 PROCEDURE — 80053 COMPREHEN METABOLIC PANEL: CPT | Performed by: INTERNAL MEDICINE

## 2021-03-15 PROCEDURE — 36415 PR COLLECTION VENOUS BLOOD,VENIPUNCTURE: ICD-10-PCS | Mod: S$GLB,,, | Performed by: INTERNAL MEDICINE

## 2021-03-15 PROCEDURE — 36415 COLL VENOUS BLD VENIPUNCTURE: CPT | Mod: S$GLB,,, | Performed by: INTERNAL MEDICINE

## 2021-03-15 PROCEDURE — 85027 COMPLETE CBC AUTOMATED: CPT | Performed by: INTERNAL MEDICINE

## 2021-03-16 ENCOUNTER — HOSPITAL ENCOUNTER (OUTPATIENT)
Dept: RADIOLOGY | Facility: HOSPITAL | Age: 71
Discharge: HOME | End: 2021-03-16
Attending: PHYSICIAN ASSISTANT
Payer: MEDICARE

## 2021-03-16 ENCOUNTER — PATIENT MESSAGE (OUTPATIENT)
Dept: ADMINISTRATIVE | Facility: OTHER | Age: 71
End: 2021-03-16

## 2021-03-16 ENCOUNTER — OFFICE VISIT (OUTPATIENT)
Dept: ORTHOPEDICS | Facility: CLINIC | Age: 71
End: 2021-03-16
Payer: MEDICARE

## 2021-03-16 VITALS — WEIGHT: 170 LBS | HEIGHT: 63 IN | BODY MASS INDEX: 30.12 KG/M2

## 2021-03-16 DIAGNOSIS — S72.001A CLOSED DISPLACED FRACTURE OF RIGHT FEMORAL NECK: Primary | ICD-10-CM

## 2021-03-16 DIAGNOSIS — S72.001A CLOSED DISPLACED FRACTURE OF RIGHT FEMORAL NECK: ICD-10-CM

## 2021-03-16 LAB
ALBUMIN SERPL BCP-MCNC: 3 G/DL (ref 3.5–5.2)
ALP SERPL-CCNC: 79 U/L (ref 55–135)
ALT SERPL W/O P-5'-P-CCNC: 7 U/L (ref 10–44)
ANION GAP SERPL CALC-SCNC: 6 MMOL/L (ref 8–16)
AST SERPL-CCNC: 18 U/L (ref 10–40)
BILIRUB SERPL-MCNC: 0.3 MG/DL (ref 0.1–1)
BUN SERPL-MCNC: 9 MG/DL (ref 8–23)
CALCIUM SERPL-MCNC: 8.9 MG/DL (ref 8.7–10.5)
CHLORIDE SERPL-SCNC: 107 MMOL/L (ref 95–110)
CO2 SERPL-SCNC: 27 MMOL/L (ref 23–29)
CREAT SERPL-MCNC: 0.9 MG/DL (ref 0.5–1.4)
ERYTHROCYTE [DISTWIDTH] IN BLOOD BY AUTOMATED COUNT: 21.2 % (ref 11.5–14.5)
EST. GFR  (AFRICAN AMERICAN): >60 ML/MIN/1.73 M^2
EST. GFR  (NON AFRICAN AMERICAN): >60 ML/MIN/1.73 M^2
GLUCOSE SERPL-MCNC: 109 MG/DL (ref 70–110)
HCT VFR BLD AUTO: 22.8 % (ref 37–48.5)
HGB BLD-MCNC: 7.3 G/DL (ref 12–16)
IMM GRANULOCYTES # BLD AUTO: 0.01 K/UL (ref 0–0.04)
MCH RBC QN AUTO: 32.4 PG (ref 27–31)
MCHC RBC AUTO-ENTMCNC: 32 G/DL (ref 32–36)
MCV RBC AUTO: 101 FL (ref 82–98)
NEUTROPHILS # BLD AUTO: 1.5 K/UL (ref 1.8–7.7)
PLATELET # BLD AUTO: 55 K/UL (ref 150–350)
PMV BLD AUTO: 12 FL (ref 9.2–12.9)
POTASSIUM SERPL-SCNC: 4 MMOL/L (ref 3.5–5.1)
PROT SERPL-MCNC: 5.6 G/DL (ref 6–8.4)
RBC # BLD AUTO: 2.25 M/UL (ref 4–5.4)
SODIUM SERPL-SCNC: 140 MMOL/L (ref 136–145)
WBC # BLD AUTO: 2.87 K/UL (ref 3.9–12.7)

## 2021-03-16 PROCEDURE — 99999 PR PBB SHADOW E&M-EST. PATIENT-LVL III: ICD-10-PCS | Mod: PBBFAC,,, | Performed by: PHYSICIAN ASSISTANT

## 2021-03-16 PROCEDURE — 99213 OFFICE O/P EST LOW 20 MIN: CPT | Mod: PBBFAC,25 | Performed by: PHYSICIAN ASSISTANT

## 2021-03-16 PROCEDURE — 99024 POSTOP FOLLOW-UP VISIT: CPT | Mod: POP,,, | Performed by: PHYSICIAN ASSISTANT

## 2021-03-16 PROCEDURE — 99024 PR POST-OP FOLLOW-UP VISIT: ICD-10-PCS | Mod: POP,,, | Performed by: PHYSICIAN ASSISTANT

## 2021-03-16 PROCEDURE — 99999 PR PBB SHADOW E&M-EST. PATIENT-LVL III: CPT | Mod: PBBFAC,,, | Performed by: PHYSICIAN ASSISTANT

## 2021-03-16 PROCEDURE — 73552 XR FEMUR 2 VIEW RIGHT: ICD-10-PCS | Mod: 26,RT,, | Performed by: RADIOLOGY

## 2021-03-16 PROCEDURE — 73552 X-RAY EXAM OF FEMUR 2/>: CPT | Mod: 26,RT,, | Performed by: RADIOLOGY

## 2021-03-16 PROCEDURE — 73552 X-RAY EXAM OF FEMUR 2/>: CPT | Mod: TC,RT

## 2021-03-18 ENCOUNTER — PATIENT MESSAGE (OUTPATIENT)
Dept: PALLIATIVE MEDICINE | Facility: CLINIC | Age: 71
End: 2021-03-18

## 2021-03-22 ENCOUNTER — LAB VISIT (OUTPATIENT)
Dept: FAMILY MEDICINE | Facility: CLINIC | Age: 71
End: 2021-03-22
Attending: INTERNAL MEDICINE
Payer: MEDICARE

## 2021-03-22 DIAGNOSIS — C92.00 AML (ACUTE MYELOBLASTIC LEUKEMIA): ICD-10-CM

## 2021-03-22 DIAGNOSIS — C92.02 ACUTE MYELOID LEUKEMIA IN RELAPSE: ICD-10-CM

## 2021-03-22 PROCEDURE — 36415 PR COLLECTION VENOUS BLOOD,VENIPUNCTURE: ICD-10-PCS | Mod: S$GLB,,, | Performed by: INTERNAL MEDICINE

## 2021-03-22 PROCEDURE — 36415 COLL VENOUS BLD VENIPUNCTURE: CPT | Mod: S$GLB,,, | Performed by: INTERNAL MEDICINE

## 2021-03-22 PROCEDURE — 80053 COMPREHEN METABOLIC PANEL: CPT | Performed by: INTERNAL MEDICINE

## 2021-03-22 PROCEDURE — 85027 COMPLETE CBC AUTOMATED: CPT | Performed by: INTERNAL MEDICINE

## 2021-03-23 LAB
ALBUMIN SERPL BCP-MCNC: 3.2 G/DL (ref 3.5–5.2)
ALP SERPL-CCNC: 76 U/L (ref 55–135)
ALT SERPL W/O P-5'-P-CCNC: 17 U/L (ref 10–44)
ANION GAP SERPL CALC-SCNC: 12 MMOL/L (ref 8–16)
AST SERPL-CCNC: 23 U/L (ref 10–40)
BILIRUB SERPL-MCNC: 0.4 MG/DL (ref 0.1–1)
BUN SERPL-MCNC: 12 MG/DL (ref 8–23)
CALCIUM SERPL-MCNC: 8.9 MG/DL (ref 8.7–10.5)
CHLORIDE SERPL-SCNC: 104 MMOL/L (ref 95–110)
CO2 SERPL-SCNC: 24 MMOL/L (ref 23–29)
CREAT SERPL-MCNC: 0.9 MG/DL (ref 0.5–1.4)
ERYTHROCYTE [DISTWIDTH] IN BLOOD BY AUTOMATED COUNT: 23.1 % (ref 11.5–14.5)
EST. GFR  (AFRICAN AMERICAN): >60 ML/MIN/1.73 M^2
EST. GFR  (NON AFRICAN AMERICAN): >60 ML/MIN/1.73 M^2
GLUCOSE SERPL-MCNC: 89 MG/DL (ref 70–110)
HCT VFR BLD AUTO: 24.6 % (ref 37–48.5)
HGB BLD-MCNC: 7.9 G/DL (ref 12–16)
IMM GRANULOCYTES # BLD AUTO: 0.03 K/UL (ref 0–0.04)
MCH RBC QN AUTO: 33.5 PG (ref 27–31)
MCHC RBC AUTO-ENTMCNC: 32.1 G/DL (ref 32–36)
MCV RBC AUTO: 104 FL (ref 82–98)
NEUTROPHILS # BLD AUTO: 3.4 K/UL (ref 1.8–7.7)
PLATELET # BLD AUTO: 64 K/UL (ref 150–350)
PMV BLD AUTO: 12 FL (ref 9.2–12.9)
POTASSIUM SERPL-SCNC: 3.7 MMOL/L (ref 3.5–5.1)
PROT SERPL-MCNC: 5.8 G/DL (ref 6–8.4)
RBC # BLD AUTO: 2.36 M/UL (ref 4–5.4)
SODIUM SERPL-SCNC: 140 MMOL/L (ref 136–145)
WBC # BLD AUTO: 4.87 K/UL (ref 3.9–12.7)

## 2021-03-29 ENCOUNTER — LAB VISIT (OUTPATIENT)
Dept: FAMILY MEDICINE | Facility: CLINIC | Age: 71
End: 2021-03-29
Attending: INTERNAL MEDICINE
Payer: MEDICARE

## 2021-03-29 DIAGNOSIS — C92.00 AML (ACUTE MYELOBLASTIC LEUKEMIA): ICD-10-CM

## 2021-03-29 DIAGNOSIS — C92.01 ACUTE MYELOID LEUKEMIA IN REMISSION: ICD-10-CM

## 2021-03-29 PROCEDURE — 80053 COMPREHEN METABOLIC PANEL: CPT | Performed by: INTERNAL MEDICINE

## 2021-03-29 PROCEDURE — 36415 PR COLLECTION VENOUS BLOOD,VENIPUNCTURE: ICD-10-PCS | Mod: S$GLB,,, | Performed by: INTERNAL MEDICINE

## 2021-03-29 PROCEDURE — 36415 COLL VENOUS BLD VENIPUNCTURE: CPT | Mod: S$GLB,,, | Performed by: INTERNAL MEDICINE

## 2021-03-29 PROCEDURE — 85027 COMPLETE CBC AUTOMATED: CPT | Performed by: INTERNAL MEDICINE

## 2021-03-30 LAB
ALBUMIN SERPL BCP-MCNC: 3.4 G/DL (ref 3.5–5.2)
ALP SERPL-CCNC: 77 U/L (ref 55–135)
ALT SERPL W/O P-5'-P-CCNC: 19 U/L (ref 10–44)
ANION GAP SERPL CALC-SCNC: 11 MMOL/L (ref 8–16)
AST SERPL-CCNC: 23 U/L (ref 10–40)
BILIRUB SERPL-MCNC: 0.4 MG/DL (ref 0.1–1)
BUN SERPL-MCNC: 14 MG/DL (ref 8–23)
CALCIUM SERPL-MCNC: 9.2 MG/DL (ref 8.7–10.5)
CHLORIDE SERPL-SCNC: 106 MMOL/L (ref 95–110)
CO2 SERPL-SCNC: 26 MMOL/L (ref 23–29)
CREAT SERPL-MCNC: 0.9 MG/DL (ref 0.5–1.4)
ERYTHROCYTE [DISTWIDTH] IN BLOOD BY AUTOMATED COUNT: ABNORMAL % (ref 11.5–14.5)
EST. GFR  (AFRICAN AMERICAN): >60 ML/MIN/1.73 M^2
EST. GFR  (NON AFRICAN AMERICAN): >60 ML/MIN/1.73 M^2
GLUCOSE SERPL-MCNC: 95 MG/DL (ref 70–110)
HCT VFR BLD AUTO: 29.6 % (ref 37–48.5)
HGB BLD-MCNC: 9.4 G/DL (ref 12–16)
IMM GRANULOCYTES # BLD AUTO: 0.07 K/UL (ref 0–0.04)
MCH RBC QN AUTO: 35.1 PG (ref 27–31)
MCHC RBC AUTO-ENTMCNC: 31.8 G/DL (ref 32–36)
MCV RBC AUTO: 110 FL (ref 82–98)
NEUTROPHILS # BLD AUTO: 4.9 K/UL (ref 1.8–7.7)
PLATELET # BLD AUTO: 72 K/UL (ref 150–450)
PMV BLD AUTO: 11.7 FL (ref 9.2–12.9)
POTASSIUM SERPL-SCNC: 3.9 MMOL/L (ref 3.5–5.1)
PROT SERPL-MCNC: 6.1 G/DL (ref 6–8.4)
RBC # BLD AUTO: 2.68 M/UL (ref 4–5.4)
SODIUM SERPL-SCNC: 143 MMOL/L (ref 136–145)
WBC # BLD AUTO: 6.86 K/UL (ref 3.9–12.7)

## 2021-03-30 NOTE — PLAN OF CARE
Pt tolerateddacogen + 1 unit PRBC well.  No s/s of reaction. Vitals stable, NAD.    
Patient A&Ox4 complaining of back pain.  Pt was at [facility] 3/29 s/p fall, with multiple vertebral fx, left AMA.  Pt came back due to unmanageable pain. pt reports drinking 4 beer every day, last drink was 2 beers at 2pm. Denies illicit drug use.   NAD noted, respirations even and unlabored.  Safety precautions in place.  Plan of care explained, pt verbalized understanding. To receive MRI.

## 2021-04-01 DIAGNOSIS — C92.02 ACUTE MYELOID LEUKEMIA IN RELAPSE: ICD-10-CM

## 2021-04-01 DIAGNOSIS — C92.02 AML (ACUTE MYELOID LEUKEMIA) IN RELAPSE: Primary | ICD-10-CM

## 2021-04-01 DIAGNOSIS — D61.810 PANCYTOPENIA DUE TO CHEMOTHERAPY: ICD-10-CM

## 2021-04-05 ENCOUNTER — PATIENT MESSAGE (OUTPATIENT)
Dept: HEMATOLOGY/ONCOLOGY | Facility: CLINIC | Age: 71
End: 2021-04-05

## 2021-04-05 ENCOUNTER — LAB VISIT (OUTPATIENT)
Dept: LAB | Facility: HOSPITAL | Age: 71
End: 2021-04-05
Attending: INTERNAL MEDICINE
Payer: MEDICARE

## 2021-04-05 ENCOUNTER — OFFICE VISIT (OUTPATIENT)
Dept: HEMATOLOGY/ONCOLOGY | Facility: CLINIC | Age: 71
End: 2021-04-05
Payer: MEDICARE

## 2021-04-05 VITALS
SYSTOLIC BLOOD PRESSURE: 111 MMHG | HEART RATE: 80 BPM | RESPIRATION RATE: 16 BRPM | OXYGEN SATURATION: 97 % | DIASTOLIC BLOOD PRESSURE: 58 MMHG

## 2021-04-05 DIAGNOSIS — M35.1 MCTD (MIXED CONNECTIVE TISSUE DISEASE): ICD-10-CM

## 2021-04-05 DIAGNOSIS — S72.001A CLOSED DISPLACED FRACTURE OF RIGHT FEMORAL NECK: ICD-10-CM

## 2021-04-05 DIAGNOSIS — C92.02 AML (ACUTE MYELOID LEUKEMIA) IN RELAPSE: ICD-10-CM

## 2021-04-05 DIAGNOSIS — D61.810 PANCYTOPENIA DUE TO CHEMOTHERAPY: ICD-10-CM

## 2021-04-05 DIAGNOSIS — C93.01 ACUTE MONOCYTIC LEUKEMIA IN REMISSION: ICD-10-CM

## 2021-04-05 DIAGNOSIS — I42.7 CHEMOTHERAPY INDUCED CARDIOMYOPATHY: Primary | ICD-10-CM

## 2021-04-05 DIAGNOSIS — T45.1X5A CHEMOTHERAPY INDUCED CARDIOMYOPATHY: Primary | ICD-10-CM

## 2021-04-05 LAB
ABO + RH BLD: NORMAL
ALBUMIN SERPL BCP-MCNC: 3.4 G/DL (ref 3.5–5.2)
ALP SERPL-CCNC: 75 U/L (ref 55–135)
ALT SERPL W/O P-5'-P-CCNC: 17 U/L (ref 10–44)
ANION GAP SERPL CALC-SCNC: 9 MMOL/L (ref 8–16)
ANISOCYTOSIS BLD QL SMEAR: ABNORMAL
AST SERPL-CCNC: 17 U/L (ref 10–40)
BASOPHILS # BLD AUTO: 0.03 K/UL (ref 0–0.2)
BASOPHILS NFR BLD: 0.4 % (ref 0–1.9)
BILIRUB SERPL-MCNC: 0.4 MG/DL (ref 0.1–1)
BLD GP AB SCN CELLS X3 SERPL QL: NORMAL
BUN SERPL-MCNC: 16 MG/DL (ref 8–23)
CALCIUM SERPL-MCNC: 9.8 MG/DL (ref 8.7–10.5)
CHLORIDE SERPL-SCNC: 102 MMOL/L (ref 95–110)
CO2 SERPL-SCNC: 24 MMOL/L (ref 23–29)
CREAT SERPL-MCNC: 1.1 MG/DL (ref 0.5–1.4)
DIFFERENTIAL METHOD: ABNORMAL
EOSINOPHIL # BLD AUTO: 0 K/UL (ref 0–0.5)
EOSINOPHIL NFR BLD: 0.4 % (ref 0–8)
ERYTHROCYTE [DISTWIDTH] IN BLOOD BY AUTOMATED COUNT: ABNORMAL % (ref 11.5–14.5)
EST. GFR  (AFRICAN AMERICAN): 59.2 ML/MIN/1.73 M^2
EST. GFR  (NON AFRICAN AMERICAN): 51.3 ML/MIN/1.73 M^2
GLUCOSE SERPL-MCNC: 116 MG/DL (ref 70–110)
HCT VFR BLD AUTO: 29.1 % (ref 37–48.5)
HGB BLD-MCNC: 9.5 G/DL (ref 12–16)
IMM GRANULOCYTES # BLD AUTO: 0.09 K/UL (ref 0–0.04)
IMM GRANULOCYTES NFR BLD AUTO: 1.1 % (ref 0–0.5)
LYMPHOCYTES # BLD AUTO: 0.9 K/UL (ref 1–4.8)
LYMPHOCYTES NFR BLD: 10.9 % (ref 18–48)
MCH RBC QN AUTO: 35.4 PG (ref 27–31)
MCHC RBC AUTO-ENTMCNC: 32.6 G/DL (ref 32–36)
MCV RBC AUTO: 109 FL (ref 82–98)
MONOCYTES # BLD AUTO: 0.4 K/UL (ref 0.3–1)
MONOCYTES NFR BLD: 5 % (ref 4–15)
NEUTROPHILS # BLD AUTO: 6.8 K/UL (ref 1.8–7.7)
NEUTROPHILS NFR BLD: 82.2 % (ref 38–73)
NRBC BLD-RTO: 0 /100 WBC
PLATELET # BLD AUTO: 70 K/UL (ref 150–450)
PLATELET BLD QL SMEAR: ABNORMAL
PMV BLD AUTO: 11.5 FL (ref 9.2–12.9)
POTASSIUM SERPL-SCNC: 4.7 MMOL/L (ref 3.5–5.1)
PROT SERPL-MCNC: 6.3 G/DL (ref 6–8.4)
RBC # BLD AUTO: 2.68 M/UL (ref 4–5.4)
SODIUM SERPL-SCNC: 135 MMOL/L (ref 136–145)
WBC # BLD AUTO: 8.28 K/UL (ref 3.9–12.7)

## 2021-04-05 PROCEDURE — 85025 COMPLETE CBC W/AUTO DIFF WBC: CPT | Performed by: INTERNAL MEDICINE

## 2021-04-05 PROCEDURE — 36415 COLL VENOUS BLD VENIPUNCTURE: CPT | Performed by: INTERNAL MEDICINE

## 2021-04-05 PROCEDURE — 99999 PR PBB SHADOW E&M-EST. PATIENT-LVL I: ICD-10-PCS | Mod: PBBFAC,,, | Performed by: INTERNAL MEDICINE

## 2021-04-05 PROCEDURE — 99211 OFF/OP EST MAY X REQ PHY/QHP: CPT | Mod: PBBFAC,25 | Performed by: INTERNAL MEDICINE

## 2021-04-05 PROCEDURE — 99215 PR OFFICE/OUTPT VISIT, EST, LEVL V, 40-54 MIN: ICD-10-PCS | Mod: S$PBB,,, | Performed by: INTERNAL MEDICINE

## 2021-04-05 PROCEDURE — 80053 COMPREHEN METABOLIC PANEL: CPT | Performed by: INTERNAL MEDICINE

## 2021-04-05 PROCEDURE — 86900 BLOOD TYPING SEROLOGIC ABO: CPT | Performed by: INTERNAL MEDICINE

## 2021-04-05 PROCEDURE — 99999 PR PBB SHADOW E&M-EST. PATIENT-LVL I: CPT | Mod: PBBFAC,,, | Performed by: INTERNAL MEDICINE

## 2021-04-05 PROCEDURE — 99215 OFFICE O/P EST HI 40 MIN: CPT | Mod: S$PBB,,, | Performed by: INTERNAL MEDICINE

## 2021-04-07 ENCOUNTER — TELEPHONE (OUTPATIENT)
Dept: HEMATOLOGY/ONCOLOGY | Facility: CLINIC | Age: 71
End: 2021-04-07

## 2021-04-07 DIAGNOSIS — T45.1X5A CHEMOTHERAPY INDUCED CARDIOMYOPATHY: Primary | ICD-10-CM

## 2021-04-07 DIAGNOSIS — C93.01 ACUTE MONOCYTIC LEUKEMIA IN REMISSION: ICD-10-CM

## 2021-04-07 DIAGNOSIS — I42.7 CHEMOTHERAPY INDUCED CARDIOMYOPATHY: Primary | ICD-10-CM

## 2021-04-20 ENCOUNTER — TELEPHONE (OUTPATIENT)
Dept: FAMILY MEDICINE | Facility: CLINIC | Age: 71
End: 2021-04-20

## 2021-04-21 ENCOUNTER — LAB VISIT (OUTPATIENT)
Dept: FAMILY MEDICINE | Facility: CLINIC | Age: 71
End: 2021-04-21
Payer: MEDICARE

## 2021-04-21 DIAGNOSIS — C92.00 AML (ACUTE MYELOBLASTIC LEUKEMIA): ICD-10-CM

## 2021-04-21 DIAGNOSIS — C92.01 ACUTE MYELOID LEUKEMIA IN REMISSION: ICD-10-CM

## 2021-04-21 PROCEDURE — 36415 PR COLLECTION VENOUS BLOOD,VENIPUNCTURE: ICD-10-PCS | Mod: S$GLB,,, | Performed by: INTERNAL MEDICINE

## 2021-04-21 PROCEDURE — 80053 COMPREHEN METABOLIC PANEL: CPT | Performed by: INTERNAL MEDICINE

## 2021-04-21 PROCEDURE — 36415 COLL VENOUS BLD VENIPUNCTURE: CPT | Mod: S$GLB,,, | Performed by: INTERNAL MEDICINE

## 2021-04-21 PROCEDURE — 85027 COMPLETE CBC AUTOMATED: CPT | Performed by: INTERNAL MEDICINE

## 2021-04-22 LAB
ALBUMIN SERPL BCP-MCNC: 3.4 G/DL (ref 3.5–5.2)
ALP SERPL-CCNC: 76 U/L (ref 55–135)
ALT SERPL W/O P-5'-P-CCNC: 27 U/L (ref 10–44)
ANION GAP SERPL CALC-SCNC: 8 MMOL/L (ref 8–16)
AST SERPL-CCNC: 26 U/L (ref 10–40)
BILIRUB SERPL-MCNC: 0.4 MG/DL (ref 0.1–1)
BUN SERPL-MCNC: 14 MG/DL (ref 8–23)
CALCIUM SERPL-MCNC: 9.3 MG/DL (ref 8.7–10.5)
CHLORIDE SERPL-SCNC: 105 MMOL/L (ref 95–110)
CO2 SERPL-SCNC: 28 MMOL/L (ref 23–29)
CREAT SERPL-MCNC: 0.9 MG/DL (ref 0.5–1.4)
ERYTHROCYTE [DISTWIDTH] IN BLOOD BY AUTOMATED COUNT: 20.3 % (ref 11.5–14.5)
EST. GFR  (AFRICAN AMERICAN): >60 ML/MIN/1.73 M^2
EST. GFR  (NON AFRICAN AMERICAN): >60 ML/MIN/1.73 M^2
GLUCOSE SERPL-MCNC: 94 MG/DL (ref 70–110)
HCT VFR BLD AUTO: 29.5 % (ref 37–48.5)
HGB BLD-MCNC: 9.2 G/DL (ref 12–16)
IMM GRANULOCYTES # BLD AUTO: 0.05 K/UL (ref 0–0.04)
MCH RBC QN AUTO: 35.4 PG (ref 27–31)
MCHC RBC AUTO-ENTMCNC: 31.2 G/DL (ref 32–36)
MCV RBC AUTO: 114 FL (ref 82–98)
NEUTROPHILS # BLD AUTO: 3.6 K/UL (ref 1.8–7.7)
PLATELET # BLD AUTO: 68 K/UL (ref 150–450)
PMV BLD AUTO: 11.6 FL (ref 9.2–12.9)
POTASSIUM SERPL-SCNC: 3.8 MMOL/L (ref 3.5–5.1)
PROT SERPL-MCNC: 6 G/DL (ref 6–8.4)
RBC # BLD AUTO: 2.6 M/UL (ref 4–5.4)
SODIUM SERPL-SCNC: 141 MMOL/L (ref 136–145)
WBC # BLD AUTO: 5.53 K/UL (ref 3.9–12.7)

## 2021-04-26 ENCOUNTER — TELEPHONE (OUTPATIENT)
Dept: PALLIATIVE MEDICINE | Facility: CLINIC | Age: 71
End: 2021-04-26

## 2021-04-27 ENCOUNTER — HOSPITAL ENCOUNTER (OUTPATIENT)
Dept: RADIOLOGY | Facility: HOSPITAL | Age: 71
Discharge: HOME | End: 2021-04-27
Attending: PHYSICIAN ASSISTANT
Payer: MEDICARE

## 2021-04-27 ENCOUNTER — OFFICE VISIT (OUTPATIENT)
Dept: ORTHOPEDICS | Facility: CLINIC | Age: 71
End: 2021-04-27
Payer: MEDICARE

## 2021-04-27 VITALS — HEIGHT: 63 IN | WEIGHT: 170 LBS | BODY MASS INDEX: 30.12 KG/M2

## 2021-04-27 DIAGNOSIS — S72.001A CLOSED DISPLACED FRACTURE OF RIGHT FEMORAL NECK: Primary | ICD-10-CM

## 2021-04-27 DIAGNOSIS — S72.001A CLOSED DISPLACED FRACTURE OF RIGHT FEMORAL NECK: ICD-10-CM

## 2021-04-27 PROCEDURE — 99213 OFFICE O/P EST LOW 20 MIN: CPT | Mod: PBBFAC,25 | Performed by: PHYSICIAN ASSISTANT

## 2021-04-27 PROCEDURE — 73502 X-RAY EXAM HIP UNI 2-3 VIEWS: CPT | Mod: 26,RT,, | Performed by: RADIOLOGY

## 2021-04-27 PROCEDURE — 73502 XR HIP 2 VIEW RIGHT: ICD-10-PCS | Mod: 26,RT,, | Performed by: RADIOLOGY

## 2021-04-27 PROCEDURE — 73502 X-RAY EXAM HIP UNI 2-3 VIEWS: CPT | Mod: TC,RT

## 2021-04-27 PROCEDURE — 99999 PR PBB SHADOW E&M-EST. PATIENT-LVL III: CPT | Mod: PBBFAC,,, | Performed by: PHYSICIAN ASSISTANT

## 2021-04-27 PROCEDURE — 99999 PR PBB SHADOW E&M-EST. PATIENT-LVL III: ICD-10-PCS | Mod: PBBFAC,,, | Performed by: PHYSICIAN ASSISTANT

## 2021-04-27 PROCEDURE — 99024 PR POST-OP FOLLOW-UP VISIT: ICD-10-PCS | Mod: POP,,, | Performed by: PHYSICIAN ASSISTANT

## 2021-04-27 PROCEDURE — 99024 POSTOP FOLLOW-UP VISIT: CPT | Mod: POP,,, | Performed by: PHYSICIAN ASSISTANT

## 2021-05-06 ENCOUNTER — LAB VISIT (OUTPATIENT)
Dept: LAB | Facility: HOSPITAL | Age: 71
End: 2021-05-06
Payer: MEDICARE

## 2021-05-06 ENCOUNTER — OFFICE VISIT (OUTPATIENT)
Dept: HEMATOLOGY/ONCOLOGY | Facility: CLINIC | Age: 71
End: 2021-05-06
Payer: MEDICARE

## 2021-05-06 VITALS
WEIGHT: 179.81 LBS | DIASTOLIC BLOOD PRESSURE: 63 MMHG | HEIGHT: 63 IN | RESPIRATION RATE: 12 BRPM | SYSTOLIC BLOOD PRESSURE: 141 MMHG | OXYGEN SATURATION: 97 % | TEMPERATURE: 98 F | HEART RATE: 67 BPM | BODY MASS INDEX: 31.86 KG/M2

## 2021-05-06 DIAGNOSIS — M35.1 MCTD (MIXED CONNECTIVE TISSUE DISEASE): ICD-10-CM

## 2021-05-06 DIAGNOSIS — T45.1X5A CINV (CHEMOTHERAPY-INDUCED NAUSEA AND VOMITING): ICD-10-CM

## 2021-05-06 DIAGNOSIS — C92.01 ACUTE MYELOID LEUKEMIA IN REMISSION: Primary | ICD-10-CM

## 2021-05-06 DIAGNOSIS — C92.00 AML (ACUTE MYELOBLASTIC LEUKEMIA): ICD-10-CM

## 2021-05-06 DIAGNOSIS — C93.01 ACUTE MONOCYTIC LEUKEMIA IN REMISSION: ICD-10-CM

## 2021-05-06 DIAGNOSIS — D64.9 SYMPTOMATIC ANEMIA: ICD-10-CM

## 2021-05-06 DIAGNOSIS — R11.2 CINV (CHEMOTHERAPY-INDUCED NAUSEA AND VOMITING): ICD-10-CM

## 2021-05-06 LAB
ABO AND RH: NORMAL
ALBUMIN SERPL BCP-MCNC: 3.6 G/DL (ref 3.5–5.2)
ALP SERPL-CCNC: 74 U/L (ref 55–135)
ALT SERPL W/O P-5'-P-CCNC: 23 U/L (ref 10–44)
ANION GAP SERPL CALC-SCNC: 7 MMOL/L (ref 8–16)
AST SERPL-CCNC: 26 U/L (ref 10–40)
BILIRUB SERPL-MCNC: 0.3 MG/DL (ref 0.1–1)
BLD GP AB SCN CELLS X3 SERPL QL: NORMAL
BUN SERPL-MCNC: 17 MG/DL (ref 8–23)
CALCIUM SERPL-MCNC: 9.8 MG/DL (ref 8.7–10.5)
CHLORIDE SERPL-SCNC: 105 MMOL/L (ref 95–110)
CO2 SERPL-SCNC: 27 MMOL/L (ref 23–29)
CREAT SERPL-MCNC: 0.9 MG/DL (ref 0.5–1.4)
ERYTHROCYTE [DISTWIDTH] IN BLOOD BY AUTOMATED COUNT: 16.9 % (ref 11.5–14.5)
EST. GFR  (AFRICAN AMERICAN): >60 ML/MIN/1.73 M^2
EST. GFR  (NON AFRICAN AMERICAN): >60 ML/MIN/1.73 M^2
GLUCOSE SERPL-MCNC: 98 MG/DL (ref 70–110)
HCT VFR BLD AUTO: 31.6 % (ref 37–48.5)
HGB BLD-MCNC: 10.2 G/DL (ref 12–16)
IMM GRANULOCYTES # BLD AUTO: 0.08 K/UL (ref 0–0.04)
MCH RBC QN AUTO: 36.7 PG (ref 27–31)
MCHC RBC AUTO-ENTMCNC: 32.3 G/DL (ref 32–36)
MCV RBC AUTO: 114 FL (ref 82–98)
NEUTROPHILS # BLD AUTO: 6 K/UL (ref 1.8–7.7)
PLATELET # BLD AUTO: 62 K/UL (ref 150–450)
PMV BLD AUTO: 10.9 FL (ref 9.2–12.9)
POTASSIUM SERPL-SCNC: 4.4 MMOL/L (ref 3.5–5.1)
PROT SERPL-MCNC: 6.5 G/DL (ref 6–8.4)
RBC # BLD AUTO: 2.78 M/UL (ref 4–5.4)
SODIUM SERPL-SCNC: 139 MMOL/L (ref 136–145)
WBC # BLD AUTO: 7.88 K/UL (ref 3.9–12.7)

## 2021-05-06 PROCEDURE — 99999 PR PBB SHADOW E&M-EST. PATIENT-LVL IV: ICD-10-PCS | Mod: PBBFAC,,, | Performed by: INTERNAL MEDICINE

## 2021-05-06 PROCEDURE — 36415 COLL VENOUS BLD VENIPUNCTURE: CPT | Performed by: INTERNAL MEDICINE

## 2021-05-06 PROCEDURE — 85027 COMPLETE CBC AUTOMATED: CPT | Performed by: INTERNAL MEDICINE

## 2021-05-06 PROCEDURE — 99214 OFFICE O/P EST MOD 30 MIN: CPT | Mod: PBBFAC,25 | Performed by: INTERNAL MEDICINE

## 2021-05-06 PROCEDURE — 86901 BLOOD TYPING SEROLOGIC RH(D): CPT | Performed by: INTERNAL MEDICINE

## 2021-05-06 PROCEDURE — 99999 PR PBB SHADOW E&M-EST. PATIENT-LVL IV: CPT | Mod: PBBFAC,,, | Performed by: INTERNAL MEDICINE

## 2021-05-06 PROCEDURE — 86850 RBC ANTIBODY SCREEN: CPT | Performed by: INTERNAL MEDICINE

## 2021-05-06 PROCEDURE — 99215 PR OFFICE/OUTPT VISIT, EST, LEVL V, 40-54 MIN: ICD-10-PCS | Mod: S$PBB,,, | Performed by: INTERNAL MEDICINE

## 2021-05-06 PROCEDURE — 99215 OFFICE O/P EST HI 40 MIN: CPT | Mod: S$PBB,,, | Performed by: INTERNAL MEDICINE

## 2021-05-06 PROCEDURE — 86900 BLOOD TYPING SEROLOGIC ABO: CPT | Performed by: INTERNAL MEDICINE

## 2021-05-06 PROCEDURE — 80053 COMPREHEN METABOLIC PANEL: CPT | Performed by: INTERNAL MEDICINE

## 2021-05-06 RX ORDER — PREDNISONE 20 MG/1
TABLET ORAL
COMMUNITY
Start: 2021-04-13 | End: 2021-09-20 | Stop reason: SDUPTHER

## 2021-05-06 RX ORDER — ACETAMINOPHEN 500 MG
500 TABLET ORAL EVERY 6 HOURS PRN
COMMUNITY
Start: 2021-02-17 | End: 2023-08-17

## 2021-05-11 DIAGNOSIS — C92.01 ACUTE MYELOID LEUKEMIA IN REMISSION: Primary | ICD-10-CM

## 2021-06-01 ENCOUNTER — PATIENT MESSAGE (OUTPATIENT)
Dept: HEMATOLOGY/ONCOLOGY | Facility: CLINIC | Age: 71
End: 2021-06-01

## 2021-06-10 DIAGNOSIS — I10 HYPERTENSION, UNSPECIFIED TYPE: ICD-10-CM

## 2021-06-14 RX ORDER — METOPROLOL SUCCINATE 25 MG/1
25 TABLET, EXTENDED RELEASE ORAL DAILY
Qty: 30 TABLET | Refills: 11 | Status: SHIPPED | OUTPATIENT
Start: 2021-06-14 | End: 2022-07-05

## 2021-06-16 ENCOUNTER — OFFICE VISIT (OUTPATIENT)
Dept: HEMATOLOGY/ONCOLOGY | Facility: CLINIC | Age: 71
End: 2021-06-16
Payer: MEDICARE

## 2021-06-16 ENCOUNTER — LAB VISIT (OUTPATIENT)
Dept: LAB | Facility: HOSPITAL | Age: 71
End: 2021-06-16
Attending: INTERNAL MEDICINE
Payer: MEDICARE

## 2021-06-16 VITALS
WEIGHT: 188.63 LBS | OXYGEN SATURATION: 98 % | HEART RATE: 69 BPM | BODY MASS INDEX: 33.42 KG/M2 | SYSTOLIC BLOOD PRESSURE: 136 MMHG | DIASTOLIC BLOOD PRESSURE: 60 MMHG | HEIGHT: 63 IN | RESPIRATION RATE: 16 BRPM | TEMPERATURE: 98 F

## 2021-06-16 DIAGNOSIS — C93.01 ACUTE MONOCYTIC LEUKEMIA IN REMISSION: Primary | ICD-10-CM

## 2021-06-16 DIAGNOSIS — T45.1X5A CHEMOTHERAPY INDUCED CARDIOMYOPATHY: ICD-10-CM

## 2021-06-16 DIAGNOSIS — S72.001A CLOSED DISPLACED FRACTURE OF RIGHT FEMORAL NECK: ICD-10-CM

## 2021-06-16 DIAGNOSIS — I42.7 CHEMOTHERAPY INDUCED CARDIOMYOPATHY: ICD-10-CM

## 2021-06-16 DIAGNOSIS — C92.01 ACUTE MYELOID LEUKEMIA IN REMISSION: ICD-10-CM

## 2021-06-16 LAB
ALBUMIN SERPL BCP-MCNC: 3.7 G/DL (ref 3.5–5.2)
ALP SERPL-CCNC: 93 U/L (ref 55–135)
ALT SERPL W/O P-5'-P-CCNC: 45 U/L (ref 10–44)
ANION GAP SERPL CALC-SCNC: 11 MMOL/L (ref 8–16)
ANISOCYTOSIS BLD QL SMEAR: SLIGHT
AST SERPL-CCNC: 23 U/L (ref 10–40)
BASOPHILS NFR BLD: 0 % (ref 0–1.9)
BILIRUB SERPL-MCNC: 0.3 MG/DL (ref 0.1–1)
BUN SERPL-MCNC: 12 MG/DL (ref 8–23)
CALCIUM SERPL-MCNC: 9.8 MG/DL (ref 8.7–10.5)
CHLORIDE SERPL-SCNC: 104 MMOL/L (ref 95–110)
CO2 SERPL-SCNC: 26 MMOL/L (ref 23–29)
CREAT SERPL-MCNC: 1 MG/DL (ref 0.5–1.4)
DIFFERENTIAL METHOD: ABNORMAL
EOSINOPHIL NFR BLD: 0 % (ref 0–8)
ERYTHROCYTE [DISTWIDTH] IN BLOOD BY AUTOMATED COUNT: 12.4 % (ref 11.5–14.5)
EST. GFR  (AFRICAN AMERICAN): >60 ML/MIN/1.73 M^2
EST. GFR  (NON AFRICAN AMERICAN): 57.2 ML/MIN/1.73 M^2
GLUCOSE SERPL-MCNC: 97 MG/DL (ref 70–110)
HCT VFR BLD AUTO: 34.5 % (ref 37–48.5)
HGB BLD-MCNC: 10.8 G/DL (ref 12–16)
IMM GRANULOCYTES # BLD AUTO: ABNORMAL K/UL (ref 0–0.04)
IMM GRANULOCYTES NFR BLD AUTO: ABNORMAL % (ref 0–0.5)
LDH SERPL L TO P-CCNC: 166 U/L (ref 110–260)
LYMPHOCYTES NFR BLD: 10 % (ref 18–48)
MCH RBC QN AUTO: 36.1 PG (ref 27–31)
MCHC RBC AUTO-ENTMCNC: 31.3 G/DL (ref 32–36)
MCV RBC AUTO: 115 FL (ref 82–98)
MONOCYTES NFR BLD: 5 % (ref 4–15)
NEUTROPHILS NFR BLD: 85 % (ref 38–73)
NRBC BLD-RTO: 0 /100 WBC
OVALOCYTES BLD QL SMEAR: ABNORMAL
PLATELET # BLD AUTO: 75 K/UL (ref 150–450)
PMV BLD AUTO: 10.6 FL (ref 9.2–12.9)
POIKILOCYTOSIS BLD QL SMEAR: SLIGHT
POLYCHROMASIA BLD QL SMEAR: ABNORMAL
POTASSIUM SERPL-SCNC: 4 MMOL/L (ref 3.5–5.1)
PROT SERPL-MCNC: 6.4 G/DL (ref 6–8.4)
RBC # BLD AUTO: 2.99 M/UL (ref 4–5.4)
SODIUM SERPL-SCNC: 141 MMOL/L (ref 136–145)
URATE SERPL-MCNC: 5.3 MG/DL (ref 2.4–5.7)
WBC # BLD AUTO: 6.67 K/UL (ref 3.9–12.7)

## 2021-06-16 PROCEDURE — 99215 OFFICE O/P EST HI 40 MIN: CPT | Mod: S$PBB,,, | Performed by: INTERNAL MEDICINE

## 2021-06-16 PROCEDURE — 80053 COMPREHEN METABOLIC PANEL: CPT | Performed by: INTERNAL MEDICINE

## 2021-06-16 PROCEDURE — 85007 BL SMEAR W/DIFF WBC COUNT: CPT | Performed by: INTERNAL MEDICINE

## 2021-06-16 PROCEDURE — 99999 PR PBB SHADOW E&M-EST. PATIENT-LVL III: ICD-10-PCS | Mod: PBBFAC,,, | Performed by: INTERNAL MEDICINE

## 2021-06-16 PROCEDURE — 85027 COMPLETE CBC AUTOMATED: CPT | Performed by: INTERNAL MEDICINE

## 2021-06-16 PROCEDURE — 83615 LACTATE (LD) (LDH) ENZYME: CPT | Performed by: INTERNAL MEDICINE

## 2021-06-16 PROCEDURE — 84550 ASSAY OF BLOOD/URIC ACID: CPT | Performed by: INTERNAL MEDICINE

## 2021-06-16 PROCEDURE — 99215 PR OFFICE/OUTPT VISIT, EST, LEVL V, 40-54 MIN: ICD-10-PCS | Mod: S$PBB,,, | Performed by: INTERNAL MEDICINE

## 2021-06-16 PROCEDURE — 99999 PR PBB SHADOW E&M-EST. PATIENT-LVL III: CPT | Mod: PBBFAC,,, | Performed by: INTERNAL MEDICINE

## 2021-06-16 PROCEDURE — 36415 COLL VENOUS BLD VENIPUNCTURE: CPT | Performed by: INTERNAL MEDICINE

## 2021-06-16 PROCEDURE — 99213 OFFICE O/P EST LOW 20 MIN: CPT | Mod: PBBFAC | Performed by: INTERNAL MEDICINE

## 2021-06-21 DIAGNOSIS — C93.01 ACUTE MONOCYTIC LEUKEMIA IN REMISSION: Primary | ICD-10-CM

## 2021-07-16 ENCOUNTER — TELEPHONE (OUTPATIENT)
Dept: HEMATOLOGY/ONCOLOGY | Facility: CLINIC | Age: 71
End: 2021-07-16

## 2021-07-16 ENCOUNTER — OFFICE VISIT (OUTPATIENT)
Dept: HEMATOLOGY/ONCOLOGY | Facility: CLINIC | Age: 71
End: 2021-07-16
Payer: MEDICARE

## 2021-07-16 DIAGNOSIS — R79.89 ELEVATED LFTS: ICD-10-CM

## 2021-07-16 DIAGNOSIS — M35.1 MCTD (MIXED CONNECTIVE TISSUE DISEASE): ICD-10-CM

## 2021-07-16 DIAGNOSIS — C92.02 AML (ACUTE MYELOID LEUKEMIA) IN RELAPSE: Primary | ICD-10-CM

## 2021-07-16 PROCEDURE — 99215 PR OFFICE/OUTPT VISIT, EST, LEVL V, 40-54 MIN: ICD-10-PCS | Mod: 95,,, | Performed by: INTERNAL MEDICINE

## 2021-07-16 PROCEDURE — 99215 OFFICE O/P EST HI 40 MIN: CPT | Mod: 95,,, | Performed by: INTERNAL MEDICINE

## 2021-07-20 ENCOUNTER — LAB VISIT (OUTPATIENT)
Dept: FAMILY MEDICINE | Facility: CLINIC | Age: 71
End: 2021-07-20
Payer: MEDICARE

## 2021-07-20 DIAGNOSIS — C93.01 ACUTE MONOCYTIC LEUKEMIA IN REMISSION: ICD-10-CM

## 2021-07-20 PROCEDURE — 36415 PR COLLECTION VENOUS BLOOD,VENIPUNCTURE: ICD-10-PCS | Mod: S$GLB,,, | Performed by: INTERNAL MEDICINE

## 2021-07-20 PROCEDURE — 80053 COMPREHEN METABOLIC PANEL: CPT | Performed by: INTERNAL MEDICINE

## 2021-07-20 PROCEDURE — 36415 COLL VENOUS BLD VENIPUNCTURE: CPT | Mod: S$GLB,,, | Performed by: INTERNAL MEDICINE

## 2021-07-20 PROCEDURE — 85025 COMPLETE CBC W/AUTO DIFF WBC: CPT | Performed by: INTERNAL MEDICINE

## 2021-07-21 LAB
ALBUMIN SERPL BCP-MCNC: 3.8 G/DL (ref 3.5–5.2)
ALP SERPL-CCNC: 86 U/L (ref 55–135)
ALT SERPL W/O P-5'-P-CCNC: 38 U/L (ref 10–44)
ANION GAP SERPL CALC-SCNC: 9 MMOL/L (ref 8–16)
AST SERPL-CCNC: 22 U/L (ref 10–40)
BASOPHILS # BLD AUTO: 0.01 K/UL (ref 0–0.2)
BASOPHILS NFR BLD: 0.2 % (ref 0–1.9)
BILIRUB SERPL-MCNC: 0.4 MG/DL (ref 0.1–1)
BUN SERPL-MCNC: 10 MG/DL (ref 8–23)
CALCIUM SERPL-MCNC: 10.1 MG/DL (ref 8.7–10.5)
CHLORIDE SERPL-SCNC: 104 MMOL/L (ref 95–110)
CO2 SERPL-SCNC: 27 MMOL/L (ref 23–29)
CREAT SERPL-MCNC: 1.1 MG/DL (ref 0.5–1.4)
DIFFERENTIAL METHOD: ABNORMAL
EOSINOPHIL # BLD AUTO: 0 K/UL (ref 0–0.5)
EOSINOPHIL NFR BLD: 0.3 % (ref 0–8)
ERYTHROCYTE [DISTWIDTH] IN BLOOD BY AUTOMATED COUNT: 12.1 % (ref 11.5–14.5)
EST. GFR  (AFRICAN AMERICAN): 58.8 ML/MIN/1.73 M^2
EST. GFR  (NON AFRICAN AMERICAN): 51 ML/MIN/1.73 M^2
GLUCOSE SERPL-MCNC: 92 MG/DL (ref 70–110)
HCT VFR BLD AUTO: 38.1 % (ref 37–48.5)
HGB BLD-MCNC: 12.1 G/DL (ref 12–16)
IMM GRANULOCYTES # BLD AUTO: 0.18 K/UL (ref 0–0.04)
IMM GRANULOCYTES NFR BLD AUTO: 3 % (ref 0–0.5)
LYMPHOCYTES # BLD AUTO: 1.5 K/UL (ref 1–4.8)
LYMPHOCYTES NFR BLD: 24.3 % (ref 18–48)
MCH RBC QN AUTO: 35.4 PG (ref 27–31)
MCHC RBC AUTO-ENTMCNC: 31.8 G/DL (ref 32–36)
MCV RBC AUTO: 111 FL (ref 82–98)
MONOCYTES # BLD AUTO: 0.4 K/UL (ref 0.3–1)
MONOCYTES NFR BLD: 6.9 % (ref 4–15)
NEUTROPHILS # BLD AUTO: 4 K/UL (ref 1.8–7.7)
NEUTROPHILS NFR BLD: 65.3 % (ref 38–73)
NRBC BLD-RTO: 0 /100 WBC
PLATELET # BLD AUTO: 73 K/UL (ref 150–450)
PLATELET BLD QL SMEAR: ABNORMAL
PMV BLD AUTO: 11.3 FL (ref 9.2–12.9)
POTASSIUM SERPL-SCNC: 4.1 MMOL/L (ref 3.5–5.1)
PROT SERPL-MCNC: 6.5 G/DL (ref 6–8.4)
RBC # BLD AUTO: 3.42 M/UL (ref 4–5.4)
SODIUM SERPL-SCNC: 140 MMOL/L (ref 136–145)
WBC # BLD AUTO: 6.08 K/UL (ref 3.9–12.7)

## 2021-08-15 ENCOUNTER — PATIENT MESSAGE (OUTPATIENT)
Dept: HEMATOLOGY/ONCOLOGY | Facility: CLINIC | Age: 71
End: 2021-08-15

## 2021-08-26 ENCOUNTER — OFFICE VISIT (OUTPATIENT)
Dept: HEMATOLOGY/ONCOLOGY | Facility: CLINIC | Age: 71
End: 2021-08-26
Payer: MEDICARE

## 2021-08-26 ENCOUNTER — LAB VISIT (OUTPATIENT)
Dept: LAB | Facility: HOSPITAL | Age: 71
End: 2021-08-26
Payer: MEDICARE

## 2021-08-26 VITALS
OXYGEN SATURATION: 96 % | TEMPERATURE: 98 F | SYSTOLIC BLOOD PRESSURE: 130 MMHG | HEIGHT: 63 IN | RESPIRATION RATE: 17 BRPM | HEART RATE: 74 BPM | DIASTOLIC BLOOD PRESSURE: 58 MMHG | WEIGHT: 199.06 LBS | BODY MASS INDEX: 35.27 KG/M2

## 2021-08-26 DIAGNOSIS — M35.1 MCTD (MIXED CONNECTIVE TISSUE DISEASE): ICD-10-CM

## 2021-08-26 DIAGNOSIS — C93.01 ACUTE MONOCYTIC LEUKEMIA IN REMISSION: Primary | ICD-10-CM

## 2021-08-26 DIAGNOSIS — C92.00 AML (ACUTE MYELOBLASTIC LEUKEMIA): ICD-10-CM

## 2021-08-26 DIAGNOSIS — M54.6 ACUTE BILATERAL THORACIC BACK PAIN: ICD-10-CM

## 2021-08-26 LAB
ALBUMIN SERPL BCP-MCNC: 3.4 G/DL (ref 3.5–5.2)
ALP SERPL-CCNC: 159 U/L (ref 55–135)
ALT SERPL W/O P-5'-P-CCNC: 351 U/L (ref 10–44)
ANION GAP SERPL CALC-SCNC: 9 MMOL/L (ref 8–16)
AST SERPL-CCNC: 363 U/L (ref 10–40)
BILIRUB SERPL-MCNC: 0.7 MG/DL (ref 0.1–1)
BUN SERPL-MCNC: 18 MG/DL (ref 8–23)
CALCIUM SERPL-MCNC: 9.6 MG/DL (ref 8.7–10.5)
CHLORIDE SERPL-SCNC: 102 MMOL/L (ref 95–110)
CO2 SERPL-SCNC: 25 MMOL/L (ref 23–29)
CREAT SERPL-MCNC: 1 MG/DL (ref 0.5–1.4)
ERYTHROCYTE [DISTWIDTH] IN BLOOD BY AUTOMATED COUNT: 12.5 % (ref 11.5–14.5)
EST. GFR  (AFRICAN AMERICAN): >60 ML/MIN/1.73 M^2
EST. GFR  (NON AFRICAN AMERICAN): 57.2 ML/MIN/1.73 M^2
GLUCOSE SERPL-MCNC: 99 MG/DL (ref 70–110)
HCT VFR BLD AUTO: 36.1 % (ref 37–48.5)
HGB BLD-MCNC: 11.7 G/DL (ref 12–16)
IMM GRANULOCYTES # BLD AUTO: 0.09 K/UL (ref 0–0.04)
MCH RBC QN AUTO: 34.8 PG (ref 27–31)
MCHC RBC AUTO-ENTMCNC: 32.4 G/DL (ref 32–36)
MCV RBC AUTO: 107 FL (ref 82–98)
NEUTROPHILS # BLD AUTO: 4.7 K/UL (ref 1.8–7.7)
PLATELET # BLD AUTO: 60 K/UL (ref 150–450)
PMV BLD AUTO: 10.3 FL (ref 9.2–12.9)
POTASSIUM SERPL-SCNC: 4.8 MMOL/L (ref 3.5–5.1)
PROT SERPL-MCNC: 6.2 G/DL (ref 6–8.4)
RBC # BLD AUTO: 3.36 M/UL (ref 4–5.4)
SODIUM SERPL-SCNC: 136 MMOL/L (ref 136–145)
WBC # BLD AUTO: 5.62 K/UL (ref 3.9–12.7)

## 2021-08-26 PROCEDURE — 85027 COMPLETE CBC AUTOMATED: CPT | Performed by: INTERNAL MEDICINE

## 2021-08-26 PROCEDURE — 80053 COMPREHEN METABOLIC PANEL: CPT | Performed by: INTERNAL MEDICINE

## 2021-08-26 PROCEDURE — 99999 PR PBB SHADOW E&M-EST. PATIENT-LVL III: CPT | Mod: PBBFAC,,, | Performed by: INTERNAL MEDICINE

## 2021-08-26 PROCEDURE — 36415 COLL VENOUS BLD VENIPUNCTURE: CPT | Performed by: INTERNAL MEDICINE

## 2021-08-26 PROCEDURE — 99215 OFFICE O/P EST HI 40 MIN: CPT | Mod: S$PBB,,, | Performed by: INTERNAL MEDICINE

## 2021-08-26 PROCEDURE — 99213 OFFICE O/P EST LOW 20 MIN: CPT | Mod: PBBFAC | Performed by: INTERNAL MEDICINE

## 2021-08-26 PROCEDURE — 99215 PR OFFICE/OUTPT VISIT, EST, LEVL V, 40-54 MIN: ICD-10-PCS | Mod: S$PBB,,, | Performed by: INTERNAL MEDICINE

## 2021-08-26 PROCEDURE — 99999 PR PBB SHADOW E&M-EST. PATIENT-LVL III: ICD-10-PCS | Mod: PBBFAC,,, | Performed by: INTERNAL MEDICINE

## 2021-09-06 ENCOUNTER — PATIENT MESSAGE (OUTPATIENT)
Dept: HEMATOLOGY/ONCOLOGY | Facility: CLINIC | Age: 71
End: 2021-09-06

## 2021-09-07 ENCOUNTER — TELEPHONE (OUTPATIENT)
Dept: HEMATOLOGY/ONCOLOGY | Facility: CLINIC | Age: 71
End: 2021-09-07

## 2021-09-10 ENCOUNTER — TELEPHONE (OUTPATIENT)
Dept: HEMATOLOGY/ONCOLOGY | Facility: CLINIC | Age: 71
End: 2021-09-10

## 2021-09-10 DIAGNOSIS — C92.02 ACUTE MYELOID LEUKEMIA IN RELAPSE: ICD-10-CM

## 2021-09-10 RX ORDER — ALPRAZOLAM 2 MG/1
2 TABLET ORAL NIGHTLY PRN
Qty: 30 TABLET | Refills: 0 | Status: SHIPPED | OUTPATIENT
Start: 2021-09-10 | End: 2021-11-03 | Stop reason: SDUPTHER

## 2021-09-13 ENCOUNTER — PATIENT MESSAGE (OUTPATIENT)
Dept: HEMATOLOGY/ONCOLOGY | Facility: CLINIC | Age: 71
End: 2021-09-13

## 2021-09-16 ENCOUNTER — LAB VISIT (OUTPATIENT)
Dept: FAMILY MEDICINE | Facility: CLINIC | Age: 71
End: 2021-09-16
Payer: MEDICARE

## 2021-09-16 DIAGNOSIS — C92.00 AML (ACUTE MYELOBLASTIC LEUKEMIA): ICD-10-CM

## 2021-09-16 PROCEDURE — 80053 COMPREHEN METABOLIC PANEL: CPT | Performed by: INTERNAL MEDICINE

## 2021-09-16 PROCEDURE — 36415 COLL VENOUS BLD VENIPUNCTURE: CPT

## 2021-09-16 PROCEDURE — 85027 COMPLETE CBC AUTOMATED: CPT | Performed by: INTERNAL MEDICINE

## 2021-09-17 LAB
ALBUMIN SERPL BCP-MCNC: 3.4 G/DL (ref 3.5–5.2)
ALP SERPL-CCNC: 128 U/L (ref 55–135)
ALT SERPL W/O P-5'-P-CCNC: 41 U/L (ref 10–44)
ANION GAP SERPL CALC-SCNC: 13 MMOL/L (ref 8–16)
AST SERPL-CCNC: 29 U/L (ref 10–40)
BILIRUB SERPL-MCNC: 0.4 MG/DL (ref 0.1–1)
BUN SERPL-MCNC: 14 MG/DL (ref 8–23)
CALCIUM SERPL-MCNC: 9.7 MG/DL (ref 8.7–10.5)
CHLORIDE SERPL-SCNC: 101 MMOL/L (ref 95–110)
CO2 SERPL-SCNC: 25 MMOL/L (ref 23–29)
CREAT SERPL-MCNC: 1.1 MG/DL (ref 0.5–1.4)
ERYTHROCYTE [DISTWIDTH] IN BLOOD BY AUTOMATED COUNT: 12.2 % (ref 11.5–14.5)
EST. GFR  (AFRICAN AMERICAN): 58.8 ML/MIN/1.73 M^2
EST. GFR  (NON AFRICAN AMERICAN): 51 ML/MIN/1.73 M^2
GLUCOSE SERPL-MCNC: 100 MG/DL (ref 70–110)
HCT VFR BLD AUTO: 38.1 % (ref 37–48.5)
HGB BLD-MCNC: 12.1 G/DL (ref 12–16)
IMM GRANULOCYTES # BLD AUTO: 0.05 K/UL (ref 0–0.04)
MCH RBC QN AUTO: 34.1 PG (ref 27–31)
MCHC RBC AUTO-ENTMCNC: 31.8 G/DL (ref 32–36)
MCV RBC AUTO: 107 FL (ref 82–98)
NEUTROPHILS # BLD AUTO: 4.9 K/UL (ref 1.8–7.7)
PLATELET # BLD AUTO: 69 K/UL (ref 150–450)
PMV BLD AUTO: 11.1 FL (ref 9.2–12.9)
POTASSIUM SERPL-SCNC: 3.9 MMOL/L (ref 3.5–5.1)
PROT SERPL-MCNC: 6.7 G/DL (ref 6–8.4)
RBC # BLD AUTO: 3.55 M/UL (ref 4–5.4)
SODIUM SERPL-SCNC: 139 MMOL/L (ref 136–145)
WBC # BLD AUTO: 6.17 K/UL (ref 3.9–12.7)

## 2021-09-20 ENCOUNTER — PATIENT MESSAGE (OUTPATIENT)
Dept: HEMATOLOGY/ONCOLOGY | Facility: CLINIC | Age: 71
End: 2021-09-20

## 2021-09-20 DIAGNOSIS — M19.90 ARTHRITIS: Primary | ICD-10-CM

## 2021-09-20 RX ORDER — PREDNISONE 20 MG/1
20 TABLET ORAL DAILY PRN
Qty: 30 TABLET | Refills: 3 | Status: SHIPPED | OUTPATIENT
Start: 2021-09-20 | End: 2022-05-30 | Stop reason: SDUPTHER

## 2021-09-21 DIAGNOSIS — C93.01 ACUTE MONOCYTIC LEUKEMIA IN REMISSION: Primary | ICD-10-CM

## 2021-10-06 ENCOUNTER — LAB VISIT (OUTPATIENT)
Dept: LAB | Facility: HOSPITAL | Age: 71
End: 2021-10-06
Payer: MEDICARE

## 2021-10-06 ENCOUNTER — OFFICE VISIT (OUTPATIENT)
Dept: HEMATOLOGY/ONCOLOGY | Facility: CLINIC | Age: 71
End: 2021-10-06
Payer: MEDICARE

## 2021-10-06 VITALS
RESPIRATION RATE: 12 BRPM | SYSTOLIC BLOOD PRESSURE: 125 MMHG | TEMPERATURE: 99 F | BODY MASS INDEX: 34.88 KG/M2 | HEIGHT: 63 IN | DIASTOLIC BLOOD PRESSURE: 59 MMHG | OXYGEN SATURATION: 96 % | WEIGHT: 196.88 LBS | HEART RATE: 92 BPM

## 2021-10-06 DIAGNOSIS — D64.9 SYMPTOMATIC ANEMIA: ICD-10-CM

## 2021-10-06 DIAGNOSIS — C92.02 AML (ACUTE MYELOID LEUKEMIA) IN RELAPSE: Primary | ICD-10-CM

## 2021-10-06 DIAGNOSIS — C93.01 ACUTE MONOCYTIC LEUKEMIA IN REMISSION: ICD-10-CM

## 2021-10-06 LAB
ALBUMIN SERPL BCP-MCNC: 3.6 G/DL (ref 3.5–5.2)
ALP SERPL-CCNC: 126 U/L (ref 55–135)
ALT SERPL W/O P-5'-P-CCNC: 70 U/L (ref 10–44)
ANION GAP SERPL CALC-SCNC: 13 MMOL/L (ref 8–16)
AST SERPL-CCNC: 36 U/L (ref 10–40)
BASOPHILS # BLD AUTO: 0.01 K/UL (ref 0–0.2)
BASOPHILS NFR BLD: 0.1 % (ref 0–1.9)
BILIRUB SERPL-MCNC: 0.4 MG/DL (ref 0.1–1)
BUN SERPL-MCNC: 16 MG/DL (ref 8–23)
CALCIUM SERPL-MCNC: 9.8 MG/DL (ref 8.7–10.5)
CHLORIDE SERPL-SCNC: 106 MMOL/L (ref 95–110)
CO2 SERPL-SCNC: 22 MMOL/L (ref 23–29)
CREAT SERPL-MCNC: 1 MG/DL (ref 0.5–1.4)
DIFFERENTIAL METHOD: ABNORMAL
EOSINOPHIL # BLD AUTO: 0 K/UL (ref 0–0.5)
EOSINOPHIL NFR BLD: 0.1 % (ref 0–8)
ERYTHROCYTE [DISTWIDTH] IN BLOOD BY AUTOMATED COUNT: 12.4 % (ref 11.5–14.5)
EST. GFR  (AFRICAN AMERICAN): >60 ML/MIN/1.73 M^2
EST. GFR  (NON AFRICAN AMERICAN): 57.2 ML/MIN/1.73 M^2
GLUCOSE SERPL-MCNC: 101 MG/DL (ref 70–110)
HCT VFR BLD AUTO: 37.6 % (ref 37–48.5)
HGB BLD-MCNC: 11.9 G/DL (ref 12–16)
IMM GRANULOCYTES # BLD AUTO: 0.06 K/UL (ref 0–0.04)
IMM GRANULOCYTES NFR BLD AUTO: 0.9 % (ref 0–0.5)
LYMPHOCYTES # BLD AUTO: 1 K/UL (ref 1–4.8)
LYMPHOCYTES NFR BLD: 14.8 % (ref 18–48)
MCH RBC QN AUTO: 33.3 PG (ref 27–31)
MCHC RBC AUTO-ENTMCNC: 31.6 G/DL (ref 32–36)
MCV RBC AUTO: 105 FL (ref 82–98)
MONOCYTES # BLD AUTO: 0.3 K/UL (ref 0.3–1)
MONOCYTES NFR BLD: 4.6 % (ref 4–15)
NEUTROPHILS # BLD AUTO: 5.5 K/UL (ref 1.8–7.7)
NEUTROPHILS NFR BLD: 79.5 % (ref 38–73)
NRBC BLD-RTO: 0 /100 WBC
PLATELET # BLD AUTO: 79 K/UL (ref 150–450)
PMV BLD AUTO: 9.5 FL (ref 9.2–12.9)
POTASSIUM SERPL-SCNC: 4.4 MMOL/L (ref 3.5–5.1)
PROT SERPL-MCNC: 6.6 G/DL (ref 6–8.4)
RBC # BLD AUTO: 3.57 M/UL (ref 4–5.4)
SODIUM SERPL-SCNC: 141 MMOL/L (ref 136–145)
WBC # BLD AUTO: 6.95 K/UL (ref 3.9–12.7)

## 2021-10-06 PROCEDURE — 99213 OFFICE O/P EST LOW 20 MIN: CPT | Mod: PBBFAC | Performed by: INTERNAL MEDICINE

## 2021-10-06 PROCEDURE — 99999 PR PBB SHADOW E&M-EST. PATIENT-LVL III: ICD-10-PCS | Mod: PBBFAC,,, | Performed by: INTERNAL MEDICINE

## 2021-10-06 PROCEDURE — 99215 OFFICE O/P EST HI 40 MIN: CPT | Mod: S$PBB,,, | Performed by: INTERNAL MEDICINE

## 2021-10-06 PROCEDURE — 80053 COMPREHEN METABOLIC PANEL: CPT | Performed by: INTERNAL MEDICINE

## 2021-10-06 PROCEDURE — 36415 COLL VENOUS BLD VENIPUNCTURE: CPT | Performed by: INTERNAL MEDICINE

## 2021-10-06 PROCEDURE — 99999 PR PBB SHADOW E&M-EST. PATIENT-LVL III: CPT | Mod: PBBFAC,,, | Performed by: INTERNAL MEDICINE

## 2021-10-06 PROCEDURE — 85025 COMPLETE CBC W/AUTO DIFF WBC: CPT | Performed by: INTERNAL MEDICINE

## 2021-10-06 PROCEDURE — 99215 PR OFFICE/OUTPT VISIT, EST, LEVL V, 40-54 MIN: ICD-10-PCS | Mod: S$PBB,,, | Performed by: INTERNAL MEDICINE

## 2021-10-16 DIAGNOSIS — L03.113 CELLULITIS OF RIGHT UPPER EXTREMITY: Primary | ICD-10-CM

## 2021-10-16 RX ORDER — DOXYCYCLINE HYCLATE 100 MG/1
100 TABLET, DELAYED RELEASE ORAL EVERY 12 HOURS
Qty: 20 TABLET | Refills: 0 | Status: SHIPPED | OUTPATIENT
Start: 2021-10-16 | End: 2023-03-14

## 2021-10-19 ENCOUNTER — PATIENT MESSAGE (OUTPATIENT)
Dept: HEMATOLOGY/ONCOLOGY | Facility: CLINIC | Age: 71
End: 2021-10-19
Payer: MEDICARE

## 2021-11-03 DIAGNOSIS — C92.02 ACUTE MYELOID LEUKEMIA IN RELAPSE: ICD-10-CM

## 2021-11-04 RX ORDER — ALPRAZOLAM 2 MG/1
2 TABLET ORAL NIGHTLY PRN
Qty: 30 TABLET | Refills: 0 | Status: SHIPPED | OUTPATIENT
Start: 2021-11-04 | End: 2021-12-30

## 2021-11-15 ENCOUNTER — PATIENT MESSAGE (OUTPATIENT)
Dept: HEMATOLOGY/ONCOLOGY | Facility: CLINIC | Age: 71
End: 2021-11-15
Payer: MEDICARE

## 2021-11-17 ENCOUNTER — OFFICE VISIT (OUTPATIENT)
Dept: HEMATOLOGY/ONCOLOGY | Facility: CLINIC | Age: 71
End: 2021-11-17
Payer: MEDICARE

## 2021-11-17 ENCOUNTER — LAB VISIT (OUTPATIENT)
Dept: LAB | Facility: HOSPITAL | Age: 71
End: 2021-11-17
Attending: INTERNAL MEDICINE
Payer: MEDICARE

## 2021-11-17 VITALS
TEMPERATURE: 99 F | RESPIRATION RATE: 18 BRPM | DIASTOLIC BLOOD PRESSURE: 56 MMHG | WEIGHT: 200.19 LBS | BODY MASS INDEX: 35.47 KG/M2 | OXYGEN SATURATION: 96 % | HEIGHT: 63 IN | HEART RATE: 99 BPM | SYSTOLIC BLOOD PRESSURE: 119 MMHG

## 2021-11-17 DIAGNOSIS — C92.00 AML (ACUTE MYELOBLASTIC LEUKEMIA): ICD-10-CM

## 2021-11-17 DIAGNOSIS — M62.838 MUSCLE SPASM: Primary | ICD-10-CM

## 2021-11-17 DIAGNOSIS — C92.02 ACUTE MYELOID LEUKEMIA IN RELAPSE: ICD-10-CM

## 2021-11-17 DIAGNOSIS — M35.1 MCTD (MIXED CONNECTIVE TISSUE DISEASE): ICD-10-CM

## 2021-11-17 LAB
ALBUMIN SERPL BCP-MCNC: 3.4 G/DL (ref 3.5–5.2)
ALP SERPL-CCNC: 88 U/L (ref 55–135)
ALT SERPL W/O P-5'-P-CCNC: 16 U/L (ref 10–44)
ANION GAP SERPL CALC-SCNC: 11 MMOL/L (ref 8–16)
AST SERPL-CCNC: 19 U/L (ref 10–40)
BILIRUB SERPL-MCNC: 0.3 MG/DL (ref 0.1–1)
BUN SERPL-MCNC: 14 MG/DL (ref 8–23)
CALCIUM SERPL-MCNC: 9.6 MG/DL (ref 8.7–10.5)
CHLORIDE SERPL-SCNC: 106 MMOL/L (ref 95–110)
CO2 SERPL-SCNC: 24 MMOL/L (ref 23–29)
CREAT SERPL-MCNC: 1.2 MG/DL (ref 0.5–1.4)
ERYTHROCYTE [DISTWIDTH] IN BLOOD BY AUTOMATED COUNT: 13 % (ref 11.5–14.5)
EST. GFR  (AFRICAN AMERICAN): 52.9 ML/MIN/1.73 M^2
EST. GFR  (NON AFRICAN AMERICAN): 45.9 ML/MIN/1.73 M^2
GLUCOSE SERPL-MCNC: 111 MG/DL (ref 70–110)
HCT VFR BLD AUTO: 38.2 % (ref 37–48.5)
HGB BLD-MCNC: 11.8 G/DL (ref 12–16)
IMM GRANULOCYTES # BLD AUTO: 0.04 K/UL (ref 0–0.04)
MCH RBC QN AUTO: 32.9 PG (ref 27–31)
MCHC RBC AUTO-ENTMCNC: 30.9 G/DL (ref 32–36)
MCV RBC AUTO: 106 FL (ref 82–98)
NEUTROPHILS # BLD AUTO: 6 K/UL (ref 1.8–7.7)
PLATELET # BLD AUTO: 75 K/UL (ref 150–450)
PMV BLD AUTO: 11 FL (ref 9.2–12.9)
POTASSIUM SERPL-SCNC: 4.6 MMOL/L (ref 3.5–5.1)
PROT SERPL-MCNC: 6.7 G/DL (ref 6–8.4)
RBC # BLD AUTO: 3.59 M/UL (ref 4–5.4)
SODIUM SERPL-SCNC: 141 MMOL/L (ref 136–145)
WBC # BLD AUTO: 7.54 K/UL (ref 3.9–12.7)

## 2021-11-17 PROCEDURE — 36415 COLL VENOUS BLD VENIPUNCTURE: CPT | Performed by: INTERNAL MEDICINE

## 2021-11-17 PROCEDURE — 80053 COMPREHEN METABOLIC PANEL: CPT | Performed by: INTERNAL MEDICINE

## 2021-11-17 PROCEDURE — 85027 COMPLETE CBC AUTOMATED: CPT | Performed by: INTERNAL MEDICINE

## 2021-11-17 PROCEDURE — 99214 OFFICE O/P EST MOD 30 MIN: CPT | Mod: PBBFAC | Performed by: INTERNAL MEDICINE

## 2021-11-17 PROCEDURE — 99215 OFFICE O/P EST HI 40 MIN: CPT | Mod: S$PBB,,, | Performed by: INTERNAL MEDICINE

## 2021-11-17 PROCEDURE — 99999 PR PBB SHADOW E&M-EST. PATIENT-LVL IV: CPT | Mod: PBBFAC,,, | Performed by: INTERNAL MEDICINE

## 2021-11-17 PROCEDURE — 99215 PR OFFICE/OUTPT VISIT, EST, LEVL V, 40-54 MIN: ICD-10-PCS | Mod: S$PBB,,, | Performed by: INTERNAL MEDICINE

## 2021-11-17 PROCEDURE — 99999 PR PBB SHADOW E&M-EST. PATIENT-LVL IV: ICD-10-PCS | Mod: PBBFAC,,, | Performed by: INTERNAL MEDICINE

## 2021-11-17 RX ORDER — METHOCARBAMOL 500 MG/1
500 TABLET, FILM COATED ORAL 4 TIMES DAILY
Qty: 90 TABLET | Refills: 0 | Status: SHIPPED | OUTPATIENT
Start: 2021-11-17 | End: 2021-11-18 | Stop reason: SDUPTHER

## 2021-11-18 RX ORDER — METHOCARBAMOL 500 MG/1
500 TABLET, FILM COATED ORAL 4 TIMES DAILY
Qty: 90 TABLET | Refills: 0 | Status: SHIPPED | OUTPATIENT
Start: 2021-11-18

## 2021-11-19 NOTE — ASSESSMENT & PLAN NOTE
- likely due to bone marrow recovery  - continue Oxy PRN  - started Zyrtec 7/11/18, now much improved   Health Maintenance Due   Topic Date Due   • Influenza Vaccine (1) 09/01/2021   • Colorectal Cancer Screen-  09/01/2021   • Medicare Wellness Visit  11/17/2021   • Breast Cancer Screening  12/26/2021       Patient is due for topics as listed above but is not proceeding with Immunization(s) Influenza at this time. Orders placed for Colorectal Cancer Screening: Colonoscopy and Mammogram.    Patient has given consent to record this visit for documentation in their clinical record.

## 2021-12-01 ENCOUNTER — PATIENT MESSAGE (OUTPATIENT)
Dept: HEMATOLOGY/ONCOLOGY | Facility: CLINIC | Age: 71
End: 2021-12-01
Payer: MEDICARE

## 2021-12-29 ENCOUNTER — OFFICE VISIT (OUTPATIENT)
Dept: HEMATOLOGY/ONCOLOGY | Facility: CLINIC | Age: 71
End: 2021-12-29
Payer: MEDICARE

## 2021-12-29 ENCOUNTER — LAB VISIT (OUTPATIENT)
Dept: LAB | Facility: HOSPITAL | Age: 71
End: 2021-12-29
Attending: INTERNAL MEDICINE
Payer: MEDICARE

## 2021-12-29 VITALS
RESPIRATION RATE: 16 BRPM | SYSTOLIC BLOOD PRESSURE: 128 MMHG | HEIGHT: 64 IN | WEIGHT: 201.38 LBS | BODY MASS INDEX: 34.38 KG/M2 | DIASTOLIC BLOOD PRESSURE: 64 MMHG | OXYGEN SATURATION: 96 % | HEART RATE: 79 BPM

## 2021-12-29 DIAGNOSIS — C92.00 AML (ACUTE MYELOBLASTIC LEUKEMIA): ICD-10-CM

## 2021-12-29 DIAGNOSIS — C93.01 ACUTE MONOCYTIC LEUKEMIA IN REMISSION: Primary | ICD-10-CM

## 2021-12-29 LAB
ALBUMIN SERPL BCP-MCNC: 3.5 G/DL (ref 3.5–5.2)
ALP SERPL-CCNC: 93 U/L (ref 55–135)
ALT SERPL W/O P-5'-P-CCNC: 26 U/L (ref 10–44)
ANION GAP SERPL CALC-SCNC: 9 MMOL/L (ref 8–16)
AST SERPL-CCNC: 20 U/L (ref 10–40)
BILIRUB SERPL-MCNC: 0.4 MG/DL (ref 0.1–1)
BUN SERPL-MCNC: 12 MG/DL (ref 8–23)
CALCIUM SERPL-MCNC: 9.7 MG/DL (ref 8.7–10.5)
CHLORIDE SERPL-SCNC: 105 MMOL/L (ref 95–110)
CO2 SERPL-SCNC: 25 MMOL/L (ref 23–29)
CREAT SERPL-MCNC: 1 MG/DL (ref 0.5–1.4)
ERYTHROCYTE [DISTWIDTH] IN BLOOD BY AUTOMATED COUNT: 13 % (ref 11.5–14.5)
EST. GFR  (AFRICAN AMERICAN): >60 ML/MIN/1.73 M^2
EST. GFR  (NON AFRICAN AMERICAN): 57.2 ML/MIN/1.73 M^2
GLUCOSE SERPL-MCNC: 88 MG/DL (ref 70–110)
HCT VFR BLD AUTO: 38.6 % (ref 37–48.5)
HGB BLD-MCNC: 11.9 G/DL (ref 12–16)
IMM GRANULOCYTES # BLD AUTO: 0.03 K/UL (ref 0–0.04)
MCH RBC QN AUTO: 32.6 PG (ref 27–31)
MCHC RBC AUTO-ENTMCNC: 30.8 G/DL (ref 32–36)
MCV RBC AUTO: 106 FL (ref 82–98)
NEUTROPHILS # BLD AUTO: 5.6 K/UL (ref 1.8–7.7)
PLATELET # BLD AUTO: 68 K/UL (ref 150–450)
PMV BLD AUTO: 10.4 FL (ref 9.2–12.9)
POTASSIUM SERPL-SCNC: 4.1 MMOL/L (ref 3.5–5.1)
PROT SERPL-MCNC: 6.5 G/DL (ref 6–8.4)
RBC # BLD AUTO: 3.65 M/UL (ref 4–5.4)
SODIUM SERPL-SCNC: 139 MMOL/L (ref 136–145)
WBC # BLD AUTO: 6.9 K/UL (ref 3.9–12.7)

## 2021-12-29 PROCEDURE — 99999 PR PBB SHADOW E&M-EST. PATIENT-LVL III: CPT | Mod: PBBFAC,,, | Performed by: INTERNAL MEDICINE

## 2021-12-29 PROCEDURE — 85027 COMPLETE CBC AUTOMATED: CPT | Performed by: INTERNAL MEDICINE

## 2021-12-29 PROCEDURE — 80053 COMPREHEN METABOLIC PANEL: CPT | Performed by: INTERNAL MEDICINE

## 2021-12-29 PROCEDURE — 99213 OFFICE O/P EST LOW 20 MIN: CPT | Mod: PBBFAC | Performed by: INTERNAL MEDICINE

## 2021-12-29 PROCEDURE — 99215 OFFICE O/P EST HI 40 MIN: CPT | Mod: S$PBB,,, | Performed by: INTERNAL MEDICINE

## 2021-12-29 PROCEDURE — 99999 PR PBB SHADOW E&M-EST. PATIENT-LVL III: ICD-10-PCS | Mod: PBBFAC,,, | Performed by: INTERNAL MEDICINE

## 2021-12-29 PROCEDURE — 99215 PR OFFICE/OUTPT VISIT, EST, LEVL V, 40-54 MIN: ICD-10-PCS | Mod: S$PBB,,, | Performed by: INTERNAL MEDICINE

## 2021-12-29 PROCEDURE — 36415 COLL VENOUS BLD VENIPUNCTURE: CPT | Performed by: INTERNAL MEDICINE

## 2022-01-04 NOTE — PROGRESS NOTES
Hematology and Medical Oncology   Follow Up Note     12/29/2021    Primary Oncologic Diagnosis: AML, FLT 3 + and NPMN1+    History of Present Ilness:   Sabrina Badillo) is a pleasant 71 y.o.female presents to clinic following 2 induction therapies for AML. She was in the hospital roughly 50 days to receive 7+3 and then FLAG JENNIFER.    Oncology History:   68 y.o. female admitted overnight for possible new AML. Came in from OSH with elevated WBC of 100.   05/25/2018: Pt is now on hydrea 2 grams BID, allopurinol 300 mg daily, and IVF. Pt may need rasburicase this weekend. She will undergo a BM bx and aspiration today. She is an induction candidate and will not be screened for research trials. She has anxiety for which she usually takes xanax, this was re-started.   05/26/2018 PICC placed. Reports she slept well last night. Anxiety improved with prn xanax. EF 65%, HIV and Hep panel negative. 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  6/12/2018: Day 15 of 7+3 induction, restaging BM bx done yesterday. On Midostaurin. Fever yesterday and BC with gram + cocci in clusters. On cefepime day 2 and will start Vanc today. Labial mucositis improving.   6/13/2018: Day 16 7+3. BC with staph aureus, identification pending. Surveillance blood cultures done today. Afebrile x 48 hours. On cefepime and Vanc. Labial mucositis resolved. No complaints of pain. Nausea controlled. No diarrhea. Primary complaint is fatigue.   6/14/20018: Day 17 of 7+3. Day 14 bone marrow results pending. BC with staph epidermis only sensitive to Vancomycin. Vanc day 3 and cefepime day 4. Vanc trough 12.2 last night. BP remains low but stable. Afebrile x 72 hours.   6/15/2018: Day 18 of 7+3. Day 14 bone marrow biopsy shows persistent disease with 15% blasts. Discussion with patient regarding treatment and she is deciding if she wants to proceed with re-induction vs outpatient maintenance. Temp of 100.4 overnight, unsustained. Day 5 Cefepime and Day 4 of  Vanc for staph epidermis. Repeat cultures NGTD. BP improved. Electrolytes (mag, phos, K and calcium) replaced aggressively this am and will repeat labs this afternoon. No complaints this am.   06/16/2018: Day 19 of 7+3. Day 2 of Flag-JENNIFER. Remains afebrile. Calcium remains low and have increased PO supplementation. Remains on vanc and aztreonam. Somewhat loopy feeling after receiving benadryl.   06/17/2018: Day 20 of 7+3. Day 3 of FLAG-JENNIFER. Multiple electrolyte abnormalities. New bilateral leg and feet swelling.   6/18/2018: Day 21 of 7+3 and Day 4 of FLAG JENNIFER. Tolerating well with some nausea controlled with Zofran. VSS, afebrile. ANC 1900 on Neupogen. Continues Vanc for staph epi bacteremia. Multiple electrolytes replaced today. Complains of edema to lower extremities, not improved after 20 of lasix yesterday. No sob or CP.   6/19/18: Day 22 of 7+3 and Day 5 of FLAG JENNIFER. VSS, afebrile, ANC 3900. Vanc trough elevated and dose adjusted this am. Lower extremity edema improved after 40 of lasix yesterday, net negative 300 cc I&O. Mild nausea, no abdominal pain or diarrhea. Poor appetite but no difficulty eating or taking pills.   6/20/18: Day 23 of 7+3 and Day 6 of FLAG JENNIFER. Patient complains of nausea and vomiting overnight and this am, especially when taking pills. Will add Zyprexa daily in addition to Zofran, compazine, and ativan PRN and will change meds to IV. 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 Afebrile.  No diarrhea, mouth sores or pain.  06/21/2018: Day 24 of 7+3 and Day 7 of FLAG JENNIFER. Nausea better controlled. Continued on Vanc.  6/22/2018: Day 25 of 7+3 and Day 8 of FLAG JENNIFER. Nausea improved some with Zyprexa but did have 1 episode of emesis overnight.continuing meds IV due to vomiting. Remains afebrile. ANC 0. Continues Vanc and Flagyl. Electrolytes replaced.   06/23/2018 : day 26 of 7+3 and Day 9 of FLAG JENNIFER.  Nausea persists,  worsened after taking pills so meds converted to IV.  Encouraged ambulation.  Continue with flagyl for leptotrichia goodfellowii.  RUQ US showed intrahepatic dilation, overall impression hepatic steatosis.  CBD 0.5cm.  No Gallbladder.    06/24/2018 : day 27 of 7+3 and Day 10 of FLAG JENNIFER.  Nausea persisting, able to tolerate some po pills.  Last day of flagyl (day 5).  Still pancytopenic. Appetite still low as po intake triggers nausea.  06/25/2018: day 28 of 7+3 and Day 11 of FLAG JENNIFER. Afebrile, ANC 0. Has completed Flagyl. Will decrease Vanc to 1000 mg q12, trough 19.9, will complete Vanc on 6/27. Liver enzymes stable on Midostaurin. VSS.   6/26/2018: day 29 of 7+3 and day 12 of FLAG JENNIFER. Liver enzymes improved and Midostaurin increased to full dose 50 mg bid. Nausea controlled, afebrile, VSS, NEON.  6/27/2018: day 30 of 7+3 and Day 13 of FLAG JENNIFER. Persistent nausea and emesis x 1 yesterday. Compazine changed to scheduled. Vanc ends today. Afebrile, VSS. Aggressive electrolyte replacement, low electrolytes possibly due to Midostaurin.   6/28/2018: day 31 of 7+3 and Day 14 of FLAG JENNIFER. Nausea improved with scheduled Compazine. Patient has agreed to start converting some IV meds to po. VSS, afebrile, electrolytes wnl today. Only complains of fatigue, new rash noted to upper back and chest and legs, papular rash mildly red.  6/29/2018: Day 32 of 7+3 and Day 15 of FLAG JENNIFER. Day 14 restaging bone marrow biopsy done at bedside today. Patient states nausea improved but she did have emesis last night after taking 9 pm pills. Rash improved. No mouth sores, diarrhea or pain.   7/2/2018: Day 35 of 7+3 and Day 18 of FLAG JENNIFER. Restaging BM bx results pending. Fluid overload over the weekend. Chest x-ray with pulmonary edema. Os sats down to 88%. Placed on O2 at 2 L NC, off O2 this am. Received lasix. Net negative 2.7 L today. 1 episode of nausea, no emesis. Reports anxiety relieved with PRN meds. Electrolytes improved,  afebrile, VSS.   7/3/2018: Day 36 of 7+3 and Day 19 of FLAG JENNIFER. Restaging Bm bx with GABRIEL. Cardiology consulted for abnormal echo, EF 30% with pulmonary HTN and severe L atrial enlargement. EKG with T wave abnormality, possible anterolateral ischemia and prolonged QTc of 530. Medications adjusted. Nausea controlled, no diarrhea. Electrolytes improved. On O2 as needed.   07/04/2018 : Day 37 of 7+3 and Day 20 of FLAG JENNIFER Diarrhea in AM, watery, no associated abdominal pain, nausea, or vomiting.    07/05/2018: Day 38 of 7+3 ane Day 21 of FLAG JENNIFER. No CP or SOB, cardiology following. On RA. All meds changed to po, denies nausea or vomiting and no diarrhea. VSS, afebrile.   7/6/2018: Day 22 of FLAG JENNIFER. Continues to tolerate po meds with no nausea. Afebrile. Awaiting count recovery for discharge.  7/7/2018-/8/2018: Day 40/41 of 7+3, Day 23/24 of FLAG JENNIFER.  Improving clinically.  Started on mag oxide per patient request.  Labs showing signs of ANC recovery.  7/9/2018: Day 25 FLAG JENNIFER, , continues Neupogen. Received platelets today. Afebrile, VSS. Tolerating all oral meds with no nausea or vomiting. Only complains of fatigue.   7/10/2018: Day 26 FLAG JENNIFER,  today. Had 2 episodes of vomiting and diarrhea last night. Feeling better. Afebrile, VSS.   7/11/2018: Day 27 FLAG JENNIFER,  today. No vomiting or diarrhea overnight and no nausea. Afebrile, VSS. Complains of back pain (bone pain) suspect due to count recovery. Relieved with oxy IR and Zyrtec started.  07/12/2018: Day 28 flag jennifer, engrafted today with ANC of 730. Back pain improved with zyrtec. Scheduled for IT chemo tomorrow at 1:30 pm and discharge home thereafter. Will likely need plt transfusion prior to IT chemo tomorrow   7/13/2018: Day 29 of FLAG JENNIFER. ANC up to 1300 today. Transfusing platelets today prior to LP for IT chemo. Patient continues to complain of bone pain, mostly to back and legs. No nausea, diarrhea, sob. Afebrile and VSS.    --hospitalized 7/23-7/25 for C.diff diarrhea, neutropenic fever.  While inpatient she developed pink blisters on her palm that were concerning for relapse.  --palm biopsy on 7/26/18 for suspicion of leukemia cutis was also negative for sign of relapse  --bone marrow biopsy 7/31/18 was negative for signs of relapse   --Admitted on 8/14/18 for HiDAC#1  --Admitted on 9/11/18 for HiDAC#2  --Admitted on 10/16/18 for HiDAC#3  --Post consolidation bone marrow biopsy on 12/27/2018: AML FISH (BM) Interpretation: The result is within normal limits for the AML FISH panel. No evidence of residual disease.  --Bone Marrow Biopsy on 5/9/19 NO MORPHOLOGIC EVIDENCE OF RESIDUAL ACUTE MYELOID LEUKEMIA. RECOMMEND NGS STUDY TO RULE OUT RESIDUAL DISEASE. deletion 20q  --Bone Marrow Biopsy on 9/23/2020: CELLULARITY=40-50%, TRILINEAGE HEMATOPOIETIC ACTIVITY (M/E= 0.6:1). ERYTHROID HYPERPLASIA, DYSERYTHROPOIESIS, AND INCREASED BLAST (11%). SEE COMMENT.  FOCAL GRADE 1 RETICULAR FIBROSIS.  --Decitabine + Venetoclax: C1D1: 10/12/2020, C2D1: 11/9  --Bone marrow biopsy on 2/9/2021: VARIABLY CELLULAR MARROW WITH DYSERYTHROPOIESIS, DYSMEGAKARYOPOIESIS AND  MARKED MYELOID HYPOPLASIA.NO MORPHOLOGIC EVIDENCE OF RESIDUAL ACUTE MYELOID LEUKEMIA.    Interval History:   Mrs. Mac is a 71 y.o. patient who presents today with her daughter.  had xrt seeds planted for early stage prostate cancer. She has made significant improvements in her ability to move and walk. She feels much more like her old self. She is happy to hear about the stability in her counts.      Overall energy is good. Sciatica pain has been uncomfortable and appears to be alternating between legs. Car rides exacerbate the issue. Has avoided tylenol after previous hepatic disfunction. Cervical neck pain relieved by muscle relaxers.    Past Medical History:   Past Medical History:   Diagnosis Date    Cancer 03/2018    AML    CHF (congestive heart failure)     Diarrhea  9/14/2018    Encounter for blood transfusion        Current Medications:   Current Outpatient Medications   Medication Sig    acetaminophen (TYLENOL) 500 MG tablet Take 500 mg by mouth every 6 (six) hours as needed.    BANOPHEN 50 mg capsule TAKE ONE CAPSULE BY MOUTH NIGHTLY AS NEEDED FOR ITCHING    doxycycline (DORYX) 100 MG EC tablet Take 1 tablet (100 mg total) by mouth every 12 (twelve) hours.    escitalopram oxalate (LEXAPRO) 20 MG tablet Take 1 tablet (20 mg total) by mouth every evening.    famotidine (PEPCID) 20 MG tablet Take 20 mg by mouth.    fluconazole (DIFLUCAN) 200 MG Tab     methocarbamoL (ROBAXIN) 500 MG Tab Take 1 tablet (500 mg total) by mouth 4 (four) times daily.    metoprolol succinate (TOPROL-XL) 25 MG 24 hr tablet Take 1 tablet (25 mg total) by mouth once daily.    omeprazole (PRILOSEC) 40 MG capsule TAKE ONE CAPSULE BY MOUTH EVERY MORNING 30-60 MINUTES BEFORE MEALS    ondansetron (ZOFRAN) 8 MG tablet     oxyCODONE (ROXICODONE) 10 mg Tab immediate release tablet Take 1 tablet (10 mg total) by mouth every 6 (six) hours as needed for Pain.    predniSONE (DELTASONE) 20 MG tablet Take 1 tablet (20 mg total) by mouth daily as needed (as needed).    ALPRAZolam (XANAX) 2 MG Tab TAKE ONE TABLET BY MOUTH NIGHTLY AS NEEDED FOR SLEEP    lactulose (CHRONULAC) 20 gram/30 mL Soln Take 23 mLs (15 g total) by mouth 3 (three) times daily as needed. (Patient not taking: No sig reported)    LORazepam (ATIVAN) 1 MG tablet Take 1 tablet (1 mg total) by mouth 2 (two) times daily. (Patient not taking: Reported on 5/6/2021)    polyethylene glycol (GLYCOLAX) 17 gram PwPk Take 17 g by mouth once daily. (Patient not taking: No sig reported)    pregabalin (LYRICA) 75 MG capsule Take 1 capsule (75 mg total) by mouth every evening. (Patient not taking: Reported on 6/16/2021)    senna-docusate 8.6-50 mg (PERICOLACE) 8.6-50 mg per tablet Take 1 tablet by mouth once daily. (Patient not taking: No sig  reported)    venetoclax (VENCLEXTA) 100 mg Tab Take 2 tablets (200 mg total) by mouth once daily. (Patient not taking: No sig reported)     No current facility-administered medications for this visit.     ALLERGIES:   Review of patient's allergies indicates:   Allergen Reactions    Methotrexate analogues      Elevated Liver Enzyme    Bactrim [sulfamethoxazole-trimethoprim] Nausea And Vomiting and Rash       Review of Systems:     Review of Systems   Constitutional: Positive for fatigue (improving). Negative for appetite change, chills, diaphoresis, fever and unexpected weight change.   HENT:   Negative for hearing loss, mouth sores, nosebleeds, sore throat, trouble swallowing and voice change.    Eyes: Negative for eye problems and icterus.   Respiratory: Negative for chest tightness, cough, hemoptysis, shortness of breath and wheezing.    Cardiovascular: Negative for chest pain, leg swelling and palpitations.   Gastrointestinal: Negative for abdominal distention, abdominal pain, blood in stool, diarrhea, nausea and vomiting.   Endocrine: Negative for hot flashes.   Genitourinary: Negative for bladder incontinence, difficulty urinating, dysuria and hematuria.    Musculoskeletal: Positive for arthralgias and myalgias. Negative for back pain, flank pain, gait problem, neck pain and neck stiffness.   Skin: Negative for itching, rash and wound.   Neurological: Negative for dizziness, extremity weakness, gait problem, headaches, numbness, seizures and speech difficulty.   Hematological: Negative for adenopathy. Does not bruise/bleed easily.   Psychiatric/Behavioral: Negative for confusion, depression and sleep disturbance. The patient is not nervous/anxious.       Vitals:    12/29/21 1307   BP: 128/64   Pulse: 79   Resp: 16       Physical Exam:   Physical Exam  Vitals reviewed.   Constitutional:       Appearance: She is well-developed.   HENT:      Head: Normocephalic and atraumatic.      Right Ear: External ear  normal.      Left Ear: External ear normal.      Mouth/Throat:      Pharynx: No posterior oropharyngeal erythema.   Eyes:      Conjunctiva/sclera: Conjunctivae normal.   Cardiovascular:      Rate and Rhythm: Normal rate and regular rhythm.      Heart sounds: Normal heart sounds. No murmur heard.     Pulmonary:      Effort: Pulmonary effort is normal. No respiratory distress.      Breath sounds: Normal breath sounds. No stridor. No wheezing or rales.   Abdominal:      General: Bowel sounds are normal. There is no distension.      Palpations: Abdomen is soft.      Tenderness: There is no abdominal tenderness.   Musculoskeletal:         General: Normal range of motion.      Cervical back: Normal range of motion.      Right lower leg: No edema.      Left lower leg: No edema.   Skin:     General: Skin is warm and dry.      Findings: No rash.   Neurological:      Mental Status: She is alert and oriented to person, place, and time.   Psychiatric:         Behavior: Behavior normal.         Thought Content: Thought content normal.         Judgment: Judgment normal.         ECOG Performance Status: (foot note - ECOG PS provided by Eastern Cooperative Oncology Group) 2 - Symptomatic, <50% confined to bed    Karnofsky Performance Score:  80%- Normal Activity with Effort: Some Symptoms of Disease    Labs:   Lab Results   Component Value Date    WBC 6.90 12/29/2021    HGB 11.9 (L) 12/29/2021    HCT 38.6 12/29/2021    PLT 68 (L) 12/29/2021    ALT 26 12/29/2021    AST 20 12/29/2021     12/29/2021    K 4.1 12/29/2021     12/29/2021    CREATININE 1.0 12/29/2021    BUN 12 12/29/2021    CO2 25 12/29/2021    TSH 1.913 10/17/2019    INR 0.9 01/31/2021    HGBA1C 6.0 (H) 01/31/2021       Imaging: previous imaging has been reviewed    Assessment and Plan:     Mrs. Mac is a 71 year old female with AML in second remission.     AML  -- Admitted from [facility] on [facility]18 early AM with WBC around 100s, for suspected new  non-M3 AML  --lo res HLA typing on 5/26/18 and hi res on 5/27/18 done in anticipation of possible need for future transplant   --Pt with two daughters, two full sisters (in their mid 60s, one with brain aneurysm hx, other one without any medical issues), and one full brother (59 y/o healthy).  --NPM1 +, CEBPA (-), FLT3 (+).  On Midostaurin 50 mg PO BID until Day 14 (held starting 6/8 to 6/11). Stopped when FLAG JENNIFER re-induction started. Restarted Midostaurin at 25 mg bid on day 8 of FLAG JENNIFER induction (6/22), increase to full dose 50 mg bid (6/26) as improvement in liver enzymes. Stopped 7/3/18 due to abnormal cardiac echo.  --day 14 bone marrow biopsy completed 6/11 showing persistent disease with 15% CD 34 positive blasts  --Day 60 of FLAG-JENNIFER, tolerating without difficulty except nausea/vomiting  --day 14 restaging bone marrow biopsy done 6/29 without complication, results with no evidence of AML, FLT 3 negative, will need repeat marrow at count recovery outpatient   --recovery marrow showed no evidence of disease  --HiDAC #1 on 8/14/18, HiDAC#2 on 9/11/18  --Cycle 3 delayed 1 week due to pancytopenia. Started on 10/16/18  --recovery bone marrow showed no signs of residual leukemia  --Bone marrow biopsy done last week reveals early relapse. 11% blasts. We discussed the possibility of re-induction, but she is adamantly against. She does not want to be admitted to the hospital.  --Plan for single agent Gilteritinib 120mg has changed based on next gen sequencing that reports FLT-3 negative at this time  --Completed 4 cycles of Decitabine + Venetoclax  --Most recent marrow shows no evidence of disease  --Will continue with supportive care      Pancytopenia  --Transfuse for Hgb <7, Plt <10K or bleeding  --continue ppx acyclovir, fluconazole and levaquin due to neutropenia  --Plan to check labs q 4 weeks     Suspected Leukemia Cutis  --pathology from dermatology confirmed inflammatory changes  --now resolved with  topical steroid cream     Abnormal Liver Function Tests  --ALT and AST and Alk phos have again increased substantially since re-starting midostaurin.   --midostaurin started day 8 at 25 mg bid, monitoring liver enzymes closely and improved. Increasing Midostaurin to 50 mg bid 6/26. Stopped Midostaurin (7/3) due to new diagnosis of systolic heart failure EF 35%.  --6/22/18 liver US showing mild intrahepatic dilation, otherwise impression of hepatic steatosis.  Will hold off of MRCP at this time.    --permanently discontinued midostaurin   --LFTs wnl    Chemotherapy induced cardiomyopathy  --cardiology follow-up as outpatient in 3 months  --repeat 2D echo on 6/15 with preserved EF of 60%.  However 7/2/18 echo with reduced EF 30-35%  --Echo on 8/15/18 was within normal range EF of 60-65%    Rheumatologic issues  --Previously on steroids and methotrexate  --working with [facility]    Dislocation of Jaw  --MRI complete 11/22/19 showing degenerative changes  --seen by ENT  --No issues now    Lack of appetite  --will refer patient to hem/onc nutrition    Constipation  --increasing prunes in diet  --add OTC stool softener or laxative as needed  --Controlled with current OTC laxatives      30 minutes were spent face to face with the patient and her family to discuss the disease, natural history, treatment options and survival statistics. I have provided the patient with an opportunity to ask questions and have all questions answered to her satisfaction.      she will return to the clinic in 4 weeks, but knows to call in the interim if symptoms change or should a problem arise.        Laquita Troy MD  Hematology and Medical Oncology  Bone Marrow Transplant  [facility]

## 2022-01-07 DIAGNOSIS — C93.01 ACUTE MONOCYTIC LEUKEMIA IN REMISSION: Primary | ICD-10-CM

## 2022-02-14 ENCOUNTER — LAB VISIT (OUTPATIENT)
Dept: LAB | Facility: HOSPITAL | Age: 72
End: 2022-02-14
Payer: MEDICARE

## 2022-02-14 ENCOUNTER — OFFICE VISIT (OUTPATIENT)
Dept: HEMATOLOGY/ONCOLOGY | Facility: CLINIC | Age: 72
End: 2022-02-14
Payer: MEDICARE

## 2022-02-14 VITALS
HEART RATE: 80 BPM | TEMPERATURE: 99 F | OXYGEN SATURATION: 96 % | HEIGHT: 64 IN | DIASTOLIC BLOOD PRESSURE: 56 MMHG | RESPIRATION RATE: 17 BRPM | BODY MASS INDEX: 34.8 KG/M2 | SYSTOLIC BLOOD PRESSURE: 118 MMHG | WEIGHT: 203.81 LBS

## 2022-02-14 DIAGNOSIS — K21.9 LPRD (LARYNGOPHARYNGEAL REFLUX DISEASE): ICD-10-CM

## 2022-02-14 DIAGNOSIS — Z74.09 IMPAIRED MOBILITY AND ADLS: ICD-10-CM

## 2022-02-14 DIAGNOSIS — G89.29 OTHER CHRONIC PAIN: ICD-10-CM

## 2022-02-14 DIAGNOSIS — G89.3 CANCER RELATED PAIN: Primary | ICD-10-CM

## 2022-02-14 DIAGNOSIS — Z78.9 IMPAIRED MOBILITY AND ADLS: ICD-10-CM

## 2022-02-14 DIAGNOSIS — C93.01 ACUTE MONOCYTIC LEUKEMIA IN REMISSION: ICD-10-CM

## 2022-02-14 LAB
ABO + RH BLD: NORMAL
ALBUMIN SERPL BCP-MCNC: 3.3 G/DL (ref 3.5–5.2)
ALP SERPL-CCNC: 83 U/L (ref 55–135)
ALT SERPL W/O P-5'-P-CCNC: 16 U/L (ref 10–44)
ANION GAP SERPL CALC-SCNC: 9 MMOL/L (ref 8–16)
AST SERPL-CCNC: 16 U/L (ref 10–40)
BASOPHILS # BLD AUTO: 0.01 K/UL (ref 0–0.2)
BASOPHILS NFR BLD: 0.1 % (ref 0–1.9)
BILIRUB SERPL-MCNC: 0.3 MG/DL (ref 0.1–1)
BLD GP AB SCN CELLS X3 SERPL QL: NORMAL
BUN SERPL-MCNC: 13 MG/DL (ref 8–23)
CALCIUM SERPL-MCNC: 9.5 MG/DL (ref 8.7–10.5)
CHLORIDE SERPL-SCNC: 104 MMOL/L (ref 95–110)
CO2 SERPL-SCNC: 25 MMOL/L (ref 23–29)
CREAT SERPL-MCNC: 1 MG/DL (ref 0.5–1.4)
DIFFERENTIAL METHOD: ABNORMAL
EOSINOPHIL # BLD AUTO: 0 K/UL (ref 0–0.5)
EOSINOPHIL NFR BLD: 0.1 % (ref 0–8)
ERYTHROCYTE [DISTWIDTH] IN BLOOD BY AUTOMATED COUNT: 13.1 % (ref 11.5–14.5)
EST. GFR  (AFRICAN AMERICAN): >60 ML/MIN/1.73 M^2
EST. GFR  (NON AFRICAN AMERICAN): 57.2 ML/MIN/1.73 M^2
GLUCOSE SERPL-MCNC: 115 MG/DL (ref 70–110)
HCT VFR BLD AUTO: 36.2 % (ref 37–48.5)
HGB BLD-MCNC: 11.2 G/DL (ref 12–16)
IMM GRANULOCYTES # BLD AUTO: 0.05 K/UL (ref 0–0.04)
IMM GRANULOCYTES NFR BLD AUTO: 0.7 % (ref 0–0.5)
LYMPHOCYTES # BLD AUTO: 1.2 K/UL (ref 1–4.8)
LYMPHOCYTES NFR BLD: 17.1 % (ref 18–48)
MCH RBC QN AUTO: 32.7 PG (ref 27–31)
MCHC RBC AUTO-ENTMCNC: 30.9 G/DL (ref 32–36)
MCV RBC AUTO: 106 FL (ref 82–98)
MONOCYTES # BLD AUTO: 0.5 K/UL (ref 0.3–1)
MONOCYTES NFR BLD: 7.2 % (ref 4–15)
NEUTROPHILS # BLD AUTO: 5.4 K/UL (ref 1.8–7.7)
NEUTROPHILS NFR BLD: 74.8 % (ref 38–73)
NRBC BLD-RTO: 0 /100 WBC
PLATELET # BLD AUTO: 73 K/UL (ref 150–450)
PMV BLD AUTO: 10.7 FL (ref 9.2–12.9)
POTASSIUM SERPL-SCNC: 4 MMOL/L (ref 3.5–5.1)
PROT SERPL-MCNC: 6.6 G/DL (ref 6–8.4)
RBC # BLD AUTO: 3.42 M/UL (ref 4–5.4)
SODIUM SERPL-SCNC: 138 MMOL/L (ref 136–145)
WBC # BLD AUTO: 7.27 K/UL (ref 3.9–12.7)

## 2022-02-14 PROCEDURE — 85025 COMPLETE CBC W/AUTO DIFF WBC: CPT | Performed by: INTERNAL MEDICINE

## 2022-02-14 PROCEDURE — 99215 PR OFFICE/OUTPT VISIT, EST, LEVL V, 40-54 MIN: ICD-10-PCS | Mod: S$PBB,,, | Performed by: INTERNAL MEDICINE

## 2022-02-14 PROCEDURE — 99213 OFFICE O/P EST LOW 20 MIN: CPT | Mod: PBBFAC | Performed by: INTERNAL MEDICINE

## 2022-02-14 PROCEDURE — 99999 PR PBB SHADOW E&M-EST. PATIENT-LVL III: CPT | Mod: PBBFAC,,, | Performed by: INTERNAL MEDICINE

## 2022-02-14 PROCEDURE — 80053 COMPREHEN METABOLIC PANEL: CPT | Performed by: INTERNAL MEDICINE

## 2022-02-14 PROCEDURE — 99215 OFFICE O/P EST HI 40 MIN: CPT | Mod: S$PBB,,, | Performed by: INTERNAL MEDICINE

## 2022-02-14 PROCEDURE — 86901 BLOOD TYPING SEROLOGIC RH(D): CPT | Performed by: INTERNAL MEDICINE

## 2022-02-14 PROCEDURE — 99999 PR PBB SHADOW E&M-EST. PATIENT-LVL III: ICD-10-PCS | Mod: PBBFAC,,, | Performed by: INTERNAL MEDICINE

## 2022-02-14 RX ORDER — OXYCODONE HYDROCHLORIDE 10 MG/1
10 TABLET ORAL EVERY 6 HOURS PRN
Qty: 60 TABLET | Refills: 0 | Status: SHIPPED | OUTPATIENT
Start: 2022-02-14 | End: 2022-05-10 | Stop reason: SDUPTHER

## 2022-02-14 RX ORDER — OMEPRAZOLE 40 MG/1
CAPSULE, DELAYED RELEASE ORAL
Qty: 30 CAPSULE | Refills: 2 | Status: SHIPPED | OUTPATIENT
Start: 2022-02-14 | End: 2022-03-11 | Stop reason: SDUPTHER

## 2022-02-14 NOTE — PROGRESS NOTES
Hematology and Medical Oncology   Follow Up Note     02/14/2022    Primary Oncologic Diagnosis: AML, FLT 3 + and NPMN1+    History of Present Ilness:   Sabrina Badillo) is a pleasant 71 y.o.female presents to clinic following 2 induction therapies for AML. She was in the hospital roughly 50 days to receive 7+3 and then FLAG JENNIFER.    Oncology History:   68 y.o. female admitted overnight for possible new AML. Came in from OSH with elevated WBC of 100.   05/25/2018: Pt is now on hydrea 2 grams BID, allopurinol 300 mg daily, and IVF. Pt may need rasburicase this weekend. She will undergo a BM bx and aspiration today. She is an induction candidate and will not be screened for research trials. She has anxiety for which she usually takes xanax, this was re-started.   05/26/2018 PICC placed. Reports she slept well last night. Anxiety improved with prn xanax. EF 65%, HIV and Hep panel negative. 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  6/12/2018: Day 15 of 7+3 induction, restaging BM bx done yesterday. On Midostaurin. Fever yesterday and BC with gram + cocci in clusters. On cefepime day 2 and will start Vanc today. Labial mucositis improving.   6/13/2018: Day 16 7+3. BC with staph aureus, identification pending. Surveillance blood cultures done today. Afebrile x 48 hours. On cefepime and Vanc. Labial mucositis resolved. No complaints of pain. Nausea controlled. No diarrhea. Primary complaint is fatigue.   6/14/20018: Day 17 of 7+3. Day 14 bone marrow results pending. BC with staph epidermis only sensitive to Vancomycin. Vanc day 3 and cefepime day 4. Vanc trough 12.2 last night. BP remains low but stable. Afebrile x 72 hours.   6/15/2018: Day 18 of 7+3. Day 14 bone marrow biopsy shows persistent disease with 15% blasts. Discussion with patient regarding treatment and she is deciding if she wants to proceed with re-induction vs outpatient maintenance. Temp of 100.4 overnight, unsustained. Day 5 Cefepime and Day 4 of  Vanc for staph epidermis. Repeat cultures NGTD. BP improved. Electrolytes (mag, phos, K and calcium) replaced aggressively this am and will repeat labs this afternoon. No complaints this am.   06/16/2018: Day 19 of 7+3. Day 2 of Flag-JENNIFER. Remains afebrile. Calcium remains low and have increased PO supplementation. Remains on vanc and aztreonam. Somewhat loopy feeling after receiving benadryl.   06/17/2018: Day 20 of 7+3. Day 3 of FLAG-JENNIFER. Multiple electrolyte abnormalities. New bilateral leg and feet swelling.   6/18/2018: Day 21 of 7+3 and Day 4 of FLAG JENNIFER. Tolerating well with some nausea controlled with Zofran. VSS, afebrile. ANC 1900 on Neupogen. Continues Vanc for staph epi bacteremia. Multiple electrolytes replaced today. Complains of edema to lower extremities, not improved after 20 of lasix yesterday. No sob or CP.   6/19/18: Day 22 of 7+3 and Day 5 of FLAG JENNIFER. VSS, afebrile, ANC 3900. Vanc trough elevated and dose adjusted this am. Lower extremity edema improved after 40 of lasix yesterday, net negative 300 cc I&O. Mild nausea, no abdominal pain or diarrhea. Poor appetite but no difficulty eating or taking pills.   6/20/18: Day 23 of 7+3 and Day 6 of FLAG JENNIFER. Patient complains of nausea and vomiting overnight and this am, especially when taking pills. Will add Zyprexa daily in addition to Zofran, compazine, and ativan PRN and will change meds to IV. 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 Afebrile.  No diarrhea, mouth sores or pain.  06/21/2018: Day 24 of 7+3 and Day 7 of FLAG JENNIFER. Nausea better controlled. Continued on Vanc.  6/22/2018: Day 25 of 7+3 and Day 8 of FLAG JENNIFER. Nausea improved some with Zyprexa but did have 1 episode of emesis overnight.continuing meds IV due to vomiting. Remains afebrile. ANC 0. Continues Vanc and Flagyl. Electrolytes replaced.   06/23/2018 : day 26 of 7+3 and Day 9 of FLAG JENNIFER.  Nausea persists,  worsened after taking pills so meds converted to IV.  Encouraged ambulation.  Continue with flagyl for leptotrichia goodfellowii.  RUQ US showed intrahepatic dilation, overall impression hepatic steatosis.  CBD 0.5cm.  No Gallbladder.    06/24/2018 : day 27 of 7+3 and Day 10 of FLAG JENNIFER.  Nausea persisting, able to tolerate some po pills.  Last day of flagyl (day 5).  Still pancytopenic. Appetite still low as po intake triggers nausea.  06/25/2018: day 28 of 7+3 and Day 11 of FLAG JENNIFER. Afebrile, ANC 0. Has completed Flagyl. Will decrease Vanc to 1000 mg q12, trough 19.9, will complete Vanc on 6/27. Liver enzymes stable on Midostaurin. VSS.   6/26/2018: day 29 of 7+3 and day 12 of FLAG JENNIFER. Liver enzymes improved and Midostaurin increased to full dose 50 mg bid. Nausea controlled, afebrile, VSS, NEON.  6/27/2018: day 30 of 7+3 and Day 13 of FLAG JENNIFER. Persistent nausea and emesis x 1 yesterday. Compazine changed to scheduled. Vanc ends today. Afebrile, VSS. Aggressive electrolyte replacement, low electrolytes possibly due to Midostaurin.   6/28/2018: day 31 of 7+3 and Day 14 of FLAG JENNIFER. Nausea improved with scheduled Compazine. Patient has agreed to start converting some IV meds to po. VSS, afebrile, electrolytes wnl today. Only complains of fatigue, new rash noted to upper back and chest and legs, papular rash mildly red.  6/29/2018: Day 32 of 7+3 and Day 15 of FLAG JENNIFER. Day 14 restaging bone marrow biopsy done at bedside today. Patient states nausea improved but she did have emesis last night after taking 9 pm pills. Rash improved. No mouth sores, diarrhea or pain.   7/2/2018: Day 35 of 7+3 and Day 18 of FLAG JENNIFER. Restaging BM bx results pending. Fluid overload over the weekend. Chest x-ray with pulmonary edema. Os sats down to 88%. Placed on O2 at 2 L NC, off O2 this am. Received lasix. Net negative 2.7 L today. 1 episode of nausea, no emesis. Reports anxiety relieved with PRN meds. Electrolytes improved,  afebrile, VSS.   7/3/2018: Day 36 of 7+3 and Day 19 of FLAG JENNIFER. Restaging Bm bx with GABRIEL. Cardiology consulted for abnormal echo, EF 30% with pulmonary HTN and severe L atrial enlargement. EKG with T wave abnormality, possible anterolateral ischemia and prolonged QTc of 530. Medications adjusted. Nausea controlled, no diarrhea. Electrolytes improved. On O2 as needed.   07/04/2018 : Day 37 of 7+3 and Day 20 of FLAG JENNIFER Diarrhea in AM, watery, no associated abdominal pain, nausea, or vomiting.    07/05/2018: Day 38 of 7+3 ane Day 21 of FLAG JENNIFER. No CP or SOB, cardiology following. On RA. All meds changed to po, denies nausea or vomiting and no diarrhea. VSS, afebrile.   7/6/2018: Day 22 of FLAG JENNIFER. Continues to tolerate po meds with no nausea. Afebrile. Awaiting count recovery for discharge.  7/7/2018-/8/2018: Day 40/41 of 7+3, Day 23/24 of FLAG JENNIFER.  Improving clinically.  Started on mag oxide per patient request.  Labs showing signs of ANC recovery.  7/9/2018: Day 25 FLAG JENNIFER, , continues Neupogen. Received platelets today. Afebrile, VSS. Tolerating all oral meds with no nausea or vomiting. Only complains of fatigue.   7/10/2018: Day 26 FLAG JENNIFER,  today. Had 2 episodes of vomiting and diarrhea last night. Feeling better. Afebrile, VSS.   7/11/2018: Day 27 FLAG JENNIFER,  today. No vomiting or diarrhea overnight and no nausea. Afebrile, VSS. Complains of back pain (bone pain) suspect due to count recovery. Relieved with oxy IR and Zyrtec started.  07/12/2018: Day 28 flag jennifer, engrafted today with ANC of 730. Back pain improved with zyrtec. Scheduled for IT chemo tomorrow at 1:30 pm and discharge home thereafter. Will likely need plt transfusion prior to IT chemo tomorrow   7/13/2018: Day 29 of FLAG JENNIFER. ANC up to 1300 today. Transfusing platelets today prior to LP for IT chemo. Patient continues to complain of bone pain, mostly to back and legs. No nausea, diarrhea, sob. Afebrile and VSS.    --hospitalized 7/23-7/25 for C.diff diarrhea, neutropenic fever.  While inpatient she developed pink blisters on her palm that were concerning for relapse.  --palm biopsy on 7/26/18 for suspicion of leukemia cutis was also negative for sign of relapse  --bone marrow biopsy 7/31/18 was negative for signs of relapse   --Admitted on 8/14/18 for HiDAC#1  --Admitted on 9/11/18 for HiDAC#2  --Admitted on 10/16/18 for HiDAC#3  --Post consolidation bone marrow biopsy on 12/27/2018: AML FISH (BM) Interpretation: The result is within normal limits for the AML FISH panel. No evidence of residual disease.  --Bone Marrow Biopsy on 5/9/19 NO MORPHOLOGIC EVIDENCE OF RESIDUAL ACUTE MYELOID LEUKEMIA. RECOMMEND NGS STUDY TO RULE OUT RESIDUAL DISEASE. deletion 20q  --Bone Marrow Biopsy on 9/23/2020: CELLULARITY=40-50%, TRILINEAGE HEMATOPOIETIC ACTIVITY (M/E= 0.6:1). ERYTHROID HYPERPLASIA, DYSERYTHROPOIESIS, AND INCREASED BLAST (11%). SEE COMMENT.  FOCAL GRADE 1 RETICULAR FIBROSIS.  --Decitabine + Venetoclax: C1D1: 10/12/2020, C2D1: 11/9  --Bone marrow biopsy on 2/9/2021: VARIABLY CELLULAR MARROW WITH DYSERYTHROPOIESIS, DYSMEGAKARYOPOIESIS AND  MARKED MYELOID HYPOPLASIA.NO MORPHOLOGIC EVIDENCE OF RESIDUAL ACUTE MYELOID LEUKEMIA.    Interval History:   Mrs. Mac is a 71 y.o. patient who presents today with her daughter.  had xrt seeds planted for early stage prostate cancer. He also recently recovered from COVID.     She had blisters in her throat which was treated by her local PCP. She feels much more like her old self. She is happy to hear about the stability in her counts.      Overall energy is good. Sciatica pain has been uncomfortable and appears to be alternating between legs, flairs atleast once a month.     Past Medical History:   Past Medical History:   Diagnosis Date    Cancer 03/2018    AML    CHF (congestive heart failure)     Diarrhea 9/14/2018    Encounter for blood transfusion        Current Medications:    Current Outpatient Medications   Medication Sig    acetaminophen (TYLENOL) 500 MG tablet Take 500 mg by mouth every 6 (six) hours as needed.    ALPRAZolam (XANAX) 2 MG Tab TAKE ONE TABLET BY MOUTH NIGHTLY AS NEEDED FOR SLEEP    BANOPHEN 50 mg capsule TAKE ONE CAPSULE BY MOUTH NIGHTLY AS NEEDED FOR ITCHING    doxycycline (DORYX) 100 MG EC tablet Take 1 tablet (100 mg total) by mouth every 12 (twelve) hours.    EScitalopram oxalate (LEXAPRO) 20 MG tablet TAKE ONE TABLET BY MOUTH IN THE EVENING    famotidine (PEPCID) 20 MG tablet Take 20 mg by mouth.    fluconazole (DIFLUCAN) 200 MG Tab     metoprolol succinate (TOPROL-XL) 25 MG 24 hr tablet Take 1 tablet (25 mg total) by mouth once daily.    ondansetron (ZOFRAN) 8 MG tablet     oxyCODONE (ROXICODONE) 10 mg Tab immediate release tablet Take 1 tablet (10 mg total) by mouth every 6 (six) hours as needed for Pain.    predniSONE (DELTASONE) 20 MG tablet Take 1 tablet (20 mg total) by mouth daily as needed (as needed).    lactulose (CHRONULAC) 20 gram/30 mL Soln Take 23 mLs (15 g total) by mouth 3 (three) times daily as needed. (Patient not taking: No sig reported)    LORazepam (ATIVAN) 1 MG tablet Take 1 tablet (1 mg total) by mouth 2 (two) times daily. (Patient not taking: Reported on 5/6/2021)    methocarbamoL (ROBAXIN) 500 MG Tab Take 1 tablet (500 mg total) by mouth 4 (four) times daily. (Patient not taking: Reported on 2/14/2022)    omeprazole (PRILOSEC) 40 MG capsule TAKE ONE CAPSULE BY MOUTH EVERY MORNING 30-60 MINUTES BEFORE MEALS    oxyCODONE (ROXICODONE) 10 mg Tab immediate release tablet Take 1 tablet (10 mg total) by mouth every 6 (six) hours as needed (pain).    polyethylene glycol (GLYCOLAX) 17 gram PwPk Take 17 g by mouth once daily. (Patient not taking: No sig reported)    pregabalin (LYRICA) 75 MG capsule Take 1 capsule (75 mg total) by mouth every evening. (Patient not taking: Reported on 6/16/2021)    senna-docusate 8.6-50 mg  (PERICOLACE) 8.6-50 mg per tablet Take 1 tablet by mouth once daily. (Patient not taking: No sig reported)    venetoclax (VENCLEXTA) 100 mg Tab Take 2 tablets (200 mg total) by mouth once daily. (Patient not taking: No sig reported)     No current facility-administered medications for this visit.     ALLERGIES:   Review of patient's allergies indicates:   Allergen Reactions    Methotrexate analogues      Elevated Liver Enzyme    Bactrim [sulfamethoxazole-trimethoprim] Nausea And Vomiting and Rash       Review of Systems:     Review of Systems   Constitutional: Positive for fatigue (improving). Negative for appetite change, chills, diaphoresis, fever and unexpected weight change.   HENT:   Negative for hearing loss, mouth sores, nosebleeds, sore throat, trouble swallowing and voice change.    Eyes: Negative for eye problems and icterus.   Respiratory: Negative for chest tightness, cough, hemoptysis, shortness of breath and wheezing.    Cardiovascular: Negative for chest pain, leg swelling and palpitations.   Gastrointestinal: Negative for abdominal distention, abdominal pain, blood in stool, diarrhea, nausea and vomiting.   Endocrine: Negative for hot flashes.   Genitourinary: Negative for bladder incontinence, difficulty urinating, dysuria and hematuria.    Musculoskeletal: Positive for arthralgias and myalgias. Negative for back pain, flank pain, gait problem, neck pain and neck stiffness.   Skin: Negative for itching, rash and wound.   Neurological: Negative for dizziness, extremity weakness, gait problem, headaches, numbness, seizures and speech difficulty.   Hematological: Negative for adenopathy. Does not bruise/bleed easily.   Psychiatric/Behavioral: Negative for confusion, depression and sleep disturbance. The patient is not nervous/anxious.       Vitals:    02/14/22 1313   BP: (!) 118/56   Pulse: 80   Resp: 17   Temp: 98.7 °F (37.1 °C)       Physical Exam:   Physical Exam  Vitals reviewed.    Constitutional:       Appearance: She is well-developed.   HENT:      Head: Normocephalic and atraumatic.      Right Ear: External ear normal.      Left Ear: External ear normal.      Mouth/Throat:      Pharynx: No posterior oropharyngeal erythema.   Eyes:      Conjunctiva/sclera: Conjunctivae normal.   Cardiovascular:      Rate and Rhythm: Normal rate and regular rhythm.      Heart sounds: Normal heart sounds. No murmur heard.     Pulmonary:      Effort: Pulmonary effort is normal. No respiratory distress.      Breath sounds: Normal breath sounds. No stridor. No wheezing or rales.   Abdominal:      General: Bowel sounds are normal. There is no distension.      Palpations: Abdomen is soft.      Tenderness: There is no abdominal tenderness.   Musculoskeletal:         General: Normal range of motion.      Cervical back: Normal range of motion.      Right lower leg: No edema.      Left lower leg: No edema.   Skin:     General: Skin is warm and dry.      Findings: No rash.   Neurological:      Mental Status: She is alert and oriented to person, place, and time.   Psychiatric:         Behavior: Behavior normal.         Thought Content: Thought content normal.         Judgment: Judgment normal.         ECOG Performance Status: (foot note - ECOG PS provided by Eastern Cooperative Oncology Group) 2 - Symptomatic, <50% confined to bed    Karnofsky Performance Score:  80%- Normal Activity with Effort: Some Symptoms of Disease    Labs:   Lab Results   Component Value Date    WBC 7.27 02/14/2022    HGB 11.2 (L) 02/14/2022    HCT 36.2 (L) 02/14/2022    PLT 73 (L) 02/14/2022    ALT 16 02/14/2022    AST 16 02/14/2022     02/14/2022    K 4.0 02/14/2022     02/14/2022    CREATININE 1.0 02/14/2022    BUN 13 02/14/2022    CO2 25 02/14/2022    TSH 1.913 10/17/2019    INR 0.9 01/31/2021    HGBA1C 6.0 (H) 01/31/2021       Imaging: previous imaging has been reviewed    Assessment and Plan:     Mrs. Mac is a 71 year old  female with AML in second remission.     AML  -- Admitted from [facility] on 5/25/18 early AM with WBC around 100s, for suspected new non-M3 AML  --lo res HLA typing on 5/26/18 and hi res on 5/27/18 done in anticipation of possible need for future transplant   --Pt with two daughters, two full sisters (in their mid 60s, one with brain aneurysm hx, other one without any medical issues), and one full brother (61 y/o healthy).  --NPM1 +, CEBPA (-), FLT3 (+).  On Midostaurin 50 mg PO BID until Day 14 (held starting 6/8 to 6/11). Stopped when FLAG JENNIFER re-induction started. Restarted Midostaurin at 25 mg bid on day 8 of FLAG JENNIFER induction (6/22), increase to full dose 50 mg bid (6/26) as improvement in liver enzymes. Stopped 7/3/18 due to abnormal cardiac echo.  --day 14 bone marrow biopsy completed 6/11 showing persistent disease with 15% CD 34 positive blasts  --Day 60 of FLAG-JENNIFER, tolerating without difficulty except nausea/vomiting  --day 14 restaging bone marrow biopsy done 6/29 without complication, results with no evidence of AML, FLT 3 negative, will need repeat marrow at count recovery outpatient   --recovery marrow showed no evidence of disease  --HiDAC #1 on 8/14/18, HiDAC#2 on 9/11/18  --Cycle 3 delayed 1 week due to pancytopenia. Started on 10/16/18  --recovery bone marrow showed no signs of residual leukemia  --Bone marrow biopsy done last week reveals early relapse. 11% blasts. We discussed the possibility of re-induction, but she is adamantly against. She does not want to be admitted to the hospital.  --Plan for single agent Gilteritinib 120mg has changed based on next gen sequencing that reports FLT-3 negative at this time  --Completed 4 cycles of Decitabine + Venetoclax  --Most recent marrow shows no evidence of disease  --Will continue with supportive care      Pancytopenia  --Transfuse for Hgb <7, Plt <10K or bleeding  --continue ppx acyclovir, fluconazole and levaquin due to neutropenia  --Plan  to check labs q 4 weeks     Suspected Leukemia Cutis  --pathology from dermatology confirmed inflammatory changes  --now resolved with topical steroid cream     Abnormal Liver Function Tests  --ALT and AST and Alk phos have again increased substantially since re-starting midostaurin.   --midostaurin started day 8 at 25 mg bid, monitoring liver enzymes closely and improved. Increasing Midostaurin to 50 mg bid 6/26. Stopped Midostaurin (7/3) due to new diagnosis of systolic heart failure EF 35%.  --6/22/18 liver US showing mild intrahepatic dilation, otherwise impression of hepatic steatosis.  Will hold off of MRCP at this time.    --permanently discontinued midostaurin   --LFTs wnl    Chemotherapy induced cardiomyopathy  --cardiology follow-up as outpatient in 3 months  --repeat 2D echo on 6/15 with preserved EF of 60%.  However 7/2/18 echo with reduced EF 30-35%  --Echo on 8/15/18 was within normal range EF of 60-65%    Rheumatologic issues  --Previously on steroids and methotrexate  --[facility]    Dislocation of Jaw  --MRI complete 11/22/19 showing degenerative changes  --seen by ENT  --No issues now    Lack of appetite  --will refer patient to hem/onc nutrition    Constipation  --increasing prunes in diet  --add OTC stool softener or laxative as needed  --Controlled with current OTC laxatives      30 minutes were spent face to face with the patient and her family to discuss the disease, natural history, treatment options and survival statistics. I have provided the patient with an opportunity to ask questions and have all questions answered to her satisfaction.      she will return to the clinic in 4 weeks, but knows to call in the interim if symptoms change or should a problem arise.        Laquita Troy MD  Hematology and Medical Oncology  Bone Marrow Transplant  [facility]

## 2022-02-16 DIAGNOSIS — C93.01 ACUTE MONOCYTIC LEUKEMIA IN REMISSION: Primary | ICD-10-CM

## 2022-02-16 DIAGNOSIS — C92.02 ACUTE MYELOID LEUKEMIA IN RELAPSE: ICD-10-CM

## 2022-02-24 DIAGNOSIS — C92.02 ACUTE MYELOID LEUKEMIA IN RELAPSE: ICD-10-CM

## 2022-02-24 RX ORDER — ALPRAZOLAM 2 MG/1
2 TABLET ORAL NIGHTLY PRN
Qty: 30 TABLET | Refills: 0 | Status: SHIPPED | OUTPATIENT
Start: 2022-02-24 | End: 2022-04-08 | Stop reason: SDUPTHER

## 2022-03-28 ENCOUNTER — TELEPHONE (OUTPATIENT)
Dept: HEMATOLOGY/ONCOLOGY | Facility: CLINIC | Age: 72
End: 2022-03-28
Payer: MEDICARE

## 2022-03-28 NOTE — TELEPHONE ENCOUNTER
"----- Message from Alfie Yan sent at 3/28/2022 10:45 AM CDT -----  Consult/Advisory:          Name Of Caller: Jesi (spouse)      Contact Preference?: [phone]       Provider Name: Sydni      Does patient feel the need to be seen today? No      What is the nature of the call?: Returning call to about r/s today's appt.          Additional Notes:  "Thank you for all that you do for our patients"       "

## 2022-04-08 ENCOUNTER — LAB VISIT (OUTPATIENT)
Dept: LAB | Facility: HOSPITAL | Age: 72
End: 2022-04-08
Attending: INTERNAL MEDICINE
Payer: MEDICARE

## 2022-04-08 ENCOUNTER — OFFICE VISIT (OUTPATIENT)
Dept: HEMATOLOGY/ONCOLOGY | Facility: CLINIC | Age: 72
End: 2022-04-08
Payer: MEDICARE

## 2022-04-08 VITALS
HEIGHT: 63 IN | WEIGHT: 202.5 LBS | DIASTOLIC BLOOD PRESSURE: 68 MMHG | OXYGEN SATURATION: 96 % | BODY MASS INDEX: 35.88 KG/M2 | SYSTOLIC BLOOD PRESSURE: 130 MMHG | RESPIRATION RATE: 16 BRPM | HEART RATE: 95 BPM

## 2022-04-08 DIAGNOSIS — M35.1 MCTD (MIXED CONNECTIVE TISSUE DISEASE): ICD-10-CM

## 2022-04-08 DIAGNOSIS — C92.02 ACUTE MYELOID LEUKEMIA IN RELAPSE: ICD-10-CM

## 2022-04-08 DIAGNOSIS — L50.9 URTICARIA: ICD-10-CM

## 2022-04-08 DIAGNOSIS — D61.810 PANCYTOPENIA DUE TO CHEMOTHERAPY: Primary | ICD-10-CM

## 2022-04-08 DIAGNOSIS — C93.01 ACUTE MONOCYTIC LEUKEMIA IN REMISSION: ICD-10-CM

## 2022-04-08 LAB
ALBUMIN SERPL BCP-MCNC: 3.5 G/DL (ref 3.5–5.2)
ALP SERPL-CCNC: 109 U/L (ref 55–135)
ALT SERPL W/O P-5'-P-CCNC: 30 U/L (ref 10–44)
ANION GAP SERPL CALC-SCNC: 13 MMOL/L (ref 8–16)
AST SERPL-CCNC: 20 U/L (ref 10–40)
BASOPHILS # BLD AUTO: 0.01 K/UL (ref 0–0.2)
BASOPHILS NFR BLD: 0.1 % (ref 0–1.9)
BILIRUB SERPL-MCNC: 0.4 MG/DL (ref 0.1–1)
BUN SERPL-MCNC: 10 MG/DL (ref 8–23)
CALCIUM SERPL-MCNC: 9.6 MG/DL (ref 8.7–10.5)
CHLORIDE SERPL-SCNC: 103 MMOL/L (ref 95–110)
CO2 SERPL-SCNC: 24 MMOL/L (ref 23–29)
CREAT SERPL-MCNC: 1 MG/DL (ref 0.5–1.4)
DIFFERENTIAL METHOD: ABNORMAL
EOSINOPHIL # BLD AUTO: 0 K/UL (ref 0–0.5)
EOSINOPHIL NFR BLD: 0.1 % (ref 0–8)
ERYTHROCYTE [DISTWIDTH] IN BLOOD BY AUTOMATED COUNT: 12.9 % (ref 11.5–14.5)
EST. GFR  (AFRICAN AMERICAN): >60 ML/MIN/1.73 M^2
EST. GFR  (NON AFRICAN AMERICAN): 57.2 ML/MIN/1.73 M^2
GLUCOSE SERPL-MCNC: 120 MG/DL (ref 70–110)
HCT VFR BLD AUTO: 40.8 % (ref 37–48.5)
HGB BLD-MCNC: 12.2 G/DL (ref 12–16)
IMM GRANULOCYTES # BLD AUTO: 0.04 K/UL (ref 0–0.04)
IMM GRANULOCYTES NFR BLD AUTO: 0.5 % (ref 0–0.5)
LYMPHOCYTES # BLD AUTO: 1.4 K/UL (ref 1–4.8)
LYMPHOCYTES NFR BLD: 16.6 % (ref 18–48)
MCH RBC QN AUTO: 31.8 PG (ref 27–31)
MCHC RBC AUTO-ENTMCNC: 29.9 G/DL (ref 32–36)
MCV RBC AUTO: 106 FL (ref 82–98)
MONOCYTES # BLD AUTO: 0.4 K/UL (ref 0.3–1)
MONOCYTES NFR BLD: 4.7 % (ref 4–15)
NEUTROPHILS # BLD AUTO: 6.4 K/UL (ref 1.8–7.7)
NEUTROPHILS NFR BLD: 78 % (ref 38–73)
NRBC BLD-RTO: 0 /100 WBC
PLATELET # BLD AUTO: 74 K/UL (ref 150–450)
PMV BLD AUTO: 10.9 FL (ref 9.2–12.9)
POTASSIUM SERPL-SCNC: 4 MMOL/L (ref 3.5–5.1)
PROT SERPL-MCNC: 6.7 G/DL (ref 6–8.4)
RBC # BLD AUTO: 3.84 M/UL (ref 4–5.4)
SODIUM SERPL-SCNC: 140 MMOL/L (ref 136–145)
WBC # BLD AUTO: 8.24 K/UL (ref 3.9–12.7)

## 2022-04-08 PROCEDURE — 85025 COMPLETE CBC W/AUTO DIFF WBC: CPT | Performed by: INTERNAL MEDICINE

## 2022-04-08 PROCEDURE — 36415 COLL VENOUS BLD VENIPUNCTURE: CPT | Performed by: INTERNAL MEDICINE

## 2022-04-08 PROCEDURE — 99213 OFFICE O/P EST LOW 20 MIN: CPT | Mod: PBBFAC | Performed by: INTERNAL MEDICINE

## 2022-04-08 PROCEDURE — 99999 PR PBB SHADOW E&M-EST. PATIENT-LVL III: ICD-10-PCS | Mod: PBBFAC,,, | Performed by: INTERNAL MEDICINE

## 2022-04-08 PROCEDURE — 99214 PR OFFICE/OUTPT VISIT, EST, LEVL IV, 30-39 MIN: ICD-10-PCS | Mod: S$PBB,,, | Performed by: INTERNAL MEDICINE

## 2022-04-08 PROCEDURE — 80053 COMPREHEN METABOLIC PANEL: CPT | Performed by: INTERNAL MEDICINE

## 2022-04-08 PROCEDURE — 99999 PR PBB SHADOW E&M-EST. PATIENT-LVL III: CPT | Mod: PBBFAC,,, | Performed by: INTERNAL MEDICINE

## 2022-04-08 PROCEDURE — 99214 OFFICE O/P EST MOD 30 MIN: CPT | Mod: S$PBB,,, | Performed by: INTERNAL MEDICINE

## 2022-04-08 RX ORDER — DIPHENHYDRAMINE HCL 50 MG
50 CAPSULE ORAL NIGHTLY PRN
Qty: 90 CAPSULE | Refills: 3 | Status: SHIPPED | OUTPATIENT
Start: 2022-04-08 | End: 2024-01-26

## 2022-04-08 RX ORDER — ALPRAZOLAM 2 MG/1
2 TABLET ORAL NIGHTLY PRN
Qty: 30 TABLET | Refills: 0 | Status: SHIPPED | OUTPATIENT
Start: 2022-04-08 | End: 2022-06-16 | Stop reason: SDUPTHER

## 2022-04-08 NOTE — PROGRESS NOTES
Hematology and Medical Oncology   Follow Up Note     04/08/2022    Primary Oncologic Diagnosis: AML, FLT 3 + and NPMN1+    History of Present Ilness:   Sabrina Badillo) is a pleasant 71 y.o.female presents to clinic following 2 induction therapies for AML. She was in the hospital roughly 50 days to receive 7+3 and then FLAG JENNIFER.    Oncology History:   68 y.o. female admitted overnight for possible new AML. Came in from OSH with elevated WBC of 100.   05/25/2018: Pt is now on hydrea 2 grams BID, allopurinol 300 mg daily, and IVF. Pt may need rasburicase this weekend. She will undergo a BM bx and aspiration today. She is an induction candidate and will not be screened for research trials. She has anxiety for which she usually takes xanax, this was re-started.   05/26/2018 PICC placed. Reports she slept well last night. Anxiety improved with prn xanax. EF 65%, HIV and Hep panel negative. 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  6/12/2018: Day 15 of 7+3 induction, restaging BM bx done yesterday. On Midostaurin. Fever yesterday and BC with gram + cocci in clusters. On cefepime day 2 and will start Vanc today. Labial mucositis improving.   6/13/2018: Day 16 7+3. BC with staph aureus, identification pending. Surveillance blood cultures done today. Afebrile x 48 hours. On cefepime and Vanc. Labial mucositis resolved. No complaints of pain. Nausea controlled. No diarrhea. Primary complaint is fatigue.   6/14/20018: Day 17 of 7+3. Day 14 bone marrow results pending. BC with staph epidermis only sensitive to Vancomycin. Vanc day 3 and cefepime day 4. Vanc trough 12.2 last night. BP remains low but stable. Afebrile x 72 hours.   6/15/2018: Day 18 of 7+3. Day 14 bone marrow biopsy shows persistent disease with 15% blasts. Discussion with patient regarding treatment and she is deciding if she wants to proceed with re-induction vs outpatient maintenance. Temp of 100.4 overnight, unsustained. Day 5 Cefepime and Day 4 of  Vanc for staph epidermis. Repeat cultures NGTD. BP improved. Electrolytes (mag, phos, K and calcium) replaced aggressively this am and will repeat labs this afternoon. No complaints this am.   06/16/2018: Day 19 of 7+3. Day 2 of Flag-JENNIFER. Remains afebrile. Calcium remains low and have increased PO supplementation. Remains on vanc and aztreonam. Somewhat loopy feeling after receiving benadryl.   06/17/2018: Day 20 of 7+3. Day 3 of FLAG-JENNIFER. Multiple electrolyte abnormalities. New bilateral leg and feet swelling.   6/18/2018: Day 21 of 7+3 and Day 4 of FLAG JENNIFER. Tolerating well with some nausea controlled with Zofran. VSS, afebrile. ANC 1900 on Neupogen. Continues Vanc for staph epi bacteremia. Multiple electrolytes replaced today. Complains of edema to lower extremities, not improved after 20 of lasix yesterday. No sob or CP.   6/19/18: Day 22 of 7+3 and Day 5 of FLAG JENNIFER. VSS, afebrile, ANC 3900. Vanc trough elevated and dose adjusted this am. Lower extremity edema improved after 40 of lasix yesterday, net negative 300 cc I&O. Mild nausea, no abdominal pain or diarrhea. Poor appetite but no difficulty eating or taking pills.   6/20/18: Day 23 of 7+3 and Day 6 of FLAG JENNIFER. Patient complains of nausea and vomiting overnight and this am, especially when taking pills. Will add Zyprexa daily in addition to Zofran, compazine, and ativan PRN and will change meds to IV. 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 Afebrile.  No diarrhea, mouth sores or pain.  06/21/2018: Day 24 of 7+3 and Day 7 of FLAG JENNIFER. Nausea better controlled. Continued on Vanc.  6/22/2018: Day 25 of 7+3 and Day 8 of FLAG JENNIFER. Nausea improved some with Zyprexa but did have 1 episode of emesis overnight.continuing meds IV due to vomiting. Remains afebrile. ANC 0. Continues Vanc and Flagyl. Electrolytes replaced.   06/23/2018 : day 26 of 7+3 and Day 9 of FLAG JENNIFER.  Nausea persists,  worsened after taking pills so meds converted to IV.  Encouraged ambulation.  Continue with flagyl for leptotrichia goodfellowii.  RUQ US showed intrahepatic dilation, overall impression hepatic steatosis.  CBD 0.5cm.  No Gallbladder.    06/24/2018 : day 27 of 7+3 and Day 10 of FLAG JENNIFER.  Nausea persisting, able to tolerate some po pills.  Last day of flagyl (day 5).  Still pancytopenic. Appetite still low as po intake triggers nausea.  06/25/2018: day 28 of 7+3 and Day 11 of FLAG JENNIFER. Afebrile, ANC 0. Has completed Flagyl. Will decrease Vanc to 1000 mg q12, trough 19.9, will complete Vanc on 6/27. Liver enzymes stable on Midostaurin. VSS.   6/26/2018: day 29 of 7+3 and day 12 of FLAG JENNIFER. Liver enzymes improved and Midostaurin increased to full dose 50 mg bid. Nausea controlled, afebrile, VSS, NEON.  6/27/2018: day 30 of 7+3 and Day 13 of FLAG JENNIFER. Persistent nausea and emesis x 1 yesterday. Compazine changed to scheduled. Vanc ends today. Afebrile, VSS. Aggressive electrolyte replacement, low electrolytes possibly due to Midostaurin.   6/28/2018: day 31 of 7+3 and Day 14 of FLAG JENNIFER. Nausea improved with scheduled Compazine. Patient has agreed to start converting some IV meds to po. VSS, afebrile, electrolytes wnl today. Only complains of fatigue, new rash noted to upper back and chest and legs, papular rash mildly red.  6/29/2018: Day 32 of 7+3 and Day 15 of FLAG JENNIFER. Day 14 restaging bone marrow biopsy done at bedside today. Patient states nausea improved but she did have emesis last night after taking 9 pm pills. Rash improved. No mouth sores, diarrhea or pain.   7/2/2018: Day 35 of 7+3 and Day 18 of FLAG JENNIFER. Restaging BM bx results pending. Fluid overload over the weekend. Chest x-ray with pulmonary edema. Os sats down to 88%. Placed on O2 at 2 L NC, off O2 this am. Received lasix. Net negative 2.7 L today. 1 episode of nausea, no emesis. Reports anxiety relieved with PRN meds. Electrolytes improved,  afebrile, VSS.   7/3/2018: Day 36 of 7+3 and Day 19 of FLAG JENNIFER. Restaging Bm bx with GABRIEL. Cardiology consulted for abnormal echo, EF 30% with pulmonary HTN and severe L atrial enlargement. EKG with T wave abnormality, possible anterolateral ischemia and prolonged QTc of 530. Medications adjusted. Nausea controlled, no diarrhea. Electrolytes improved. On O2 as needed.   07/04/2018 : Day 37 of 7+3 and Day 20 of FLAG JENNIFER Diarrhea in AM, watery, no associated abdominal pain, nausea, or vomiting.    07/05/2018: Day 38 of 7+3 ane Day 21 of FLAG JENNIFER. No CP or SOB, cardiology following. On RA. All meds changed to po, denies nausea or vomiting and no diarrhea. VSS, afebrile.   7/6/2018: Day 22 of FLAG JENNIFER. Continues to tolerate po meds with no nausea. Afebrile. Awaiting count recovery for discharge.  7/7/2018-/8/2018: Day 40/41 of 7+3, Day 23/24 of FLAG JENNIFER.  Improving clinically.  Started on mag oxide per patient request.  Labs showing signs of ANC recovery.  7/9/2018: Day 25 FLAG JENNIFER, , continues Neupogen. Received platelets today. Afebrile, VSS. Tolerating all oral meds with no nausea or vomiting. Only complains of fatigue.   7/10/2018: Day 26 FLAG JENNIFER,  today. Had 2 episodes of vomiting and diarrhea last night. Feeling better. Afebrile, VSS.   7/11/2018: Day 27 FLAG JENNIFER,  today. No vomiting or diarrhea overnight and no nausea. Afebrile, VSS. Complains of back pain (bone pain) suspect due to count recovery. Relieved with oxy IR and Zyrtec started.  07/12/2018: Day 28 flag jennifer, engrafted today with ANC of 730. Back pain improved with zyrtec. Scheduled for IT chemo tomorrow at 1:30 pm and discharge home thereafter. Will likely need plt transfusion prior to IT chemo tomorrow   7/13/2018: Day 29 of FLAG JENNIFER. ANC up to 1300 today. Transfusing platelets today prior to LP for IT chemo. Patient continues to complain of bone pain, mostly to back and legs. No nausea, diarrhea, sob. Afebrile and VSS.    --hospitalized 7/23-7/25 for C.diff diarrhea, neutropenic fever.  While inpatient she developed pink blisters on her palm that were concerning for relapse.  --palm biopsy on 7/26/18 for suspicion of leukemia cutis was also negative for sign of relapse  --bone marrow biopsy 7/31/18 was negative for signs of relapse   --Admitted on 8/14/18 for HiDAC#1  --Admitted on 9/11/18 for HiDAC#2  --Admitted on 10/16/18 for HiDAC#3  --Post consolidation bone marrow biopsy on 12/27/2018: AML FISH (BM) Interpretation: The result is within normal limits for the AML FISH panel. No evidence of residual disease.  --Bone Marrow Biopsy on 5/9/19 NO MORPHOLOGIC EVIDENCE OF RESIDUAL ACUTE MYELOID LEUKEMIA. RECOMMEND NGS STUDY TO RULE OUT RESIDUAL DISEASE. deletion 20q  --Bone Marrow Biopsy on 9/23/2020: CELLULARITY=40-50%, TRILINEAGE HEMATOPOIETIC ACTIVITY (M/E= 0.6:1). ERYTHROID HYPERPLASIA, DYSERYTHROPOIESIS, AND INCREASED BLAST (11%). SEE COMMENT.  FOCAL GRADE 1 RETICULAR FIBROSIS.  --Decitabine + Venetoclax: C1D1: 10/12/2020, C2D1: 11/9  --Bone marrow biopsy on 2/9/2021: VARIABLY CELLULAR MARROW WITH DYSERYTHROPOIESIS, DYSMEGAKARYOPOIESIS AND  MARKED MYELOID HYPOPLASIA.NO MORPHOLOGIC EVIDENCE OF RESIDUAL ACUTE MYELOID LEUKEMIA.    Interval History:   Mrs. Mac is a 71 y.o. patient who presents today with her .  had xrt seeds planted for early stage prostate cancer.      Overall doing well. Recently had a flare in arthritis. She is happy to hear about the stability in her counts.      Continues to use a walker for long distances.     Past Medical History:   Past Medical History:   Diagnosis Date    Cancer 03/2018    AML    CHF (congestive heart failure)     Diarrhea 9/14/2018    Encounter for blood transfusion        Current Medications:   Current Outpatient Medications   Medication Sig    acetaminophen (TYLENOL) 500 MG tablet Take 500 mg by mouth every 6 (six) hours as needed.    ALPRAZolam (XANAX) 2 MG Tab  Take 1 tablet (2 mg total) by mouth nightly as needed (insomnia).    BANOPHEN 50 mg capsule TAKE ONE CAPSULE BY MOUTH NIGHTLY AS NEEDED FOR ITCHING    doxycycline (DORYX) 100 MG EC tablet Take 1 tablet (100 mg total) by mouth every 12 (twelve) hours.    EScitalopram oxalate (LEXAPRO) 20 MG tablet TAKE ONE TABLET BY MOUTH IN THE EVENING    famotidine (PEPCID) 20 MG tablet Take 20 mg by mouth.    fluconazole (DIFLUCAN) 200 MG Tab     metoprolol succinate (TOPROL-XL) 25 MG 24 hr tablet Take 1 tablet (25 mg total) by mouth once daily.    omeprazole (PRILOSEC) 40 MG capsule TAKE ONE CAPSULE BY MOUTH EVERY MORNING 30-60 MINUTES BEFORE MEALS    ondansetron (ZOFRAN) 8 MG tablet     oxyCODONE (ROXICODONE) 10 mg Tab immediate release tablet Take 1 tablet (10 mg total) by mouth every 6 (six) hours as needed for Pain.    oxyCODONE (ROXICODONE) 10 mg Tab immediate release tablet Take 1 tablet (10 mg total) by mouth every 6 (six) hours as needed (pain).    predniSONE (DELTASONE) 20 MG tablet Take 1 tablet (20 mg total) by mouth daily as needed (as needed).    lactulose (CHRONULAC) 20 gram/30 mL Soln Take 23 mLs (15 g total) by mouth 3 (three) times daily as needed. (Patient not taking: No sig reported)    LORazepam (ATIVAN) 1 MG tablet Take 1 tablet (1 mg total) by mouth 2 (two) times daily. (Patient not taking: Reported on 5/6/2021)    methocarbamoL (ROBAXIN) 500 MG Tab Take 1 tablet (500 mg total) by mouth 4 (four) times daily. (Patient not taking: No sig reported)    polyethylene glycol (GLYCOLAX) 17 gram PwPk Take 17 g by mouth once daily. (Patient not taking: No sig reported)    pregabalin (LYRICA) 75 MG capsule Take 1 capsule (75 mg total) by mouth every evening. (Patient not taking: Reported on 6/16/2021)    senna-docusate 8.6-50 mg (PERICOLACE) 8.6-50 mg per tablet Take 1 tablet by mouth once daily. (Patient not taking: No sig reported)    venetoclax (VENCLEXTA) 100 mg Tab Take 2 tablets (200 mg total)  by mouth once daily. (Patient not taking: No sig reported)     No current facility-administered medications for this visit.     ALLERGIES:   Review of patient's allergies indicates:   Allergen Reactions    Methotrexate analogues      Elevated Liver Enzyme    Bactrim [sulfamethoxazole-trimethoprim] Nausea And Vomiting and Rash       Review of Systems:     Review of Systems   Constitutional: Positive for fatigue (improving). Negative for appetite change, chills, diaphoresis, fever and unexpected weight change.   HENT:   Negative for hearing loss, mouth sores, nosebleeds, sore throat, trouble swallowing and voice change.    Eyes: Negative for eye problems and icterus.   Respiratory: Negative for chest tightness, cough, hemoptysis, shortness of breath and wheezing.    Cardiovascular: Negative for chest pain, leg swelling and palpitations.   Gastrointestinal: Negative for abdominal distention, abdominal pain, blood in stool, diarrhea, nausea and vomiting.   Endocrine: Negative for hot flashes.   Genitourinary: Negative for bladder incontinence, difficulty urinating, dysuria and hematuria.    Musculoskeletal: Positive for arthralgias and myalgias. Negative for back pain, flank pain, gait problem, neck pain and neck stiffness.   Skin: Negative for itching, rash and wound.   Neurological: Negative for dizziness, extremity weakness, gait problem, headaches, numbness, seizures and speech difficulty.   Hematological: Negative for adenopathy. Does not bruise/bleed easily.   Psychiatric/Behavioral: Negative for confusion, depression and sleep disturbance. The patient is not nervous/anxious.       Vitals:    04/08/22 1504   BP: 130/68   Pulse: 95   Resp: 16       Physical Exam:   Physical Exam  Vitals reviewed.   Constitutional:       Appearance: She is well-developed.   HENT:      Head: Normocephalic and atraumatic.      Right Ear: External ear normal.      Left Ear: External ear normal.      Mouth/Throat:      Pharynx: No  posterior oropharyngeal erythema.   Eyes:      Conjunctiva/sclera: Conjunctivae normal.   Cardiovascular:      Rate and Rhythm: Normal rate and regular rhythm.      Heart sounds: Normal heart sounds. No murmur heard.     Pulmonary:      Effort: Pulmonary effort is normal. No respiratory distress.      Breath sounds: Normal breath sounds. No stridor. No wheezing or rales.   Abdominal:      General: Bowel sounds are normal. There is no distension.      Palpations: Abdomen is soft.      Tenderness: There is no abdominal tenderness.   Musculoskeletal:         General: Normal range of motion.      Cervical back: Normal range of motion.      Right lower leg: No edema.      Left lower leg: No edema.   Skin:     General: Skin is warm and dry.      Findings: No rash.   Neurological:      Mental Status: She is alert and oriented to person, place, and time.   Psychiatric:         Behavior: Behavior normal.         Thought Content: Thought content normal.         Judgment: Judgment normal.         ECOG Performance Status: (foot note - ECOG PS provided by Eastern Cooperative Oncology Group) 2 - Symptomatic, <50% confined to bed    Karnofsky Performance Score:  80%- Normal Activity with Effort: Some Symptoms of Disease    Labs:   Lab Results   Component Value Date    WBC 8.24 04/08/2022    HGB 12.2 04/08/2022    HCT 40.8 04/08/2022    PLT 74 (L) 04/08/2022    ALT 30 04/08/2022    AST 20 04/08/2022     04/08/2022    K 4.0 04/08/2022     04/08/2022    CREATININE 1.0 04/08/2022    BUN 10 04/08/2022    CO2 24 04/08/2022    TSH 1.913 10/17/2019    INR 0.9 01/31/2021    HGBA1C 6.0 (H) 01/31/2021       Imaging: previous imaging has been reviewed    Assessment and Plan:     Mrs. Mac is a 71 year old female with AML in second remission.     AML  -- Admitted from [facility] on 5/25/18 early AM with WBC around 100s, for suspected new non-M3 AML  --lo res HLA typing on 5/26/18 and hi res on 5/27/18 done in anticipation of  possible need for future transplant   --Pt with two daughters, two full sisters (in their mid 60s, one with brain aneurysm hx, other one without any medical issues), and one full brother (61 y/o healthy).  --NPM1 +, CEBPA (-), FLT3 (+).  On Midostaurin 50 mg PO BID until Day 14 (held starting 6/8 to 6/11). Stopped when FLAG JENNIFER re-induction started. Restarted Midostaurin at 25 mg bid on day 8 of FLAG JENNIFER induction (6/22), increase to full dose 50 mg bid (6/26) as improvement in liver enzymes. Stopped 7/3/18 due to abnormal cardiac echo.  --day 14 bone marrow biopsy completed 6/11 showing persistent disease with 15% CD 34 positive blasts  --Day 60 of FLAG-JENNIFER, tolerating without difficulty except nausea/vomiting  --day 14 restaging bone marrow biopsy done 6/29 without complication, results with no evidence of AML, FLT 3 negative, will need repeat marrow at count recovery outpatient   --recovery marrow showed no evidence of disease  --HiDAC #1 on 8/14/18, HiDAC#2 on 9/11/18  --Cycle 3 delayed 1 week due to pancytopenia. Started on 10/16/18  --recovery bone marrow showed no signs of residual leukemia  --Bone marrow biopsy done last week reveals early relapse. 11% blasts. We discussed the possibility of re-induction, but she is adamantly against. She does not want to be admitted to the hospital.  --Plan for single agent Gilteritinib 120mg has changed based on next gen sequencing that reports FLT-3 negative at this time  --Completed 4 cycles of Decitabine + Venetoclax  --Most recent marrow shows no evidence of disease  --Will continue with supportive care      Pancytopenia  --Transfuse for Hgb <7, Plt <10K or bleeding  --continue ppx acyclovir, fluconazole and levaquin due to neutropenia  --Plan to check labs q 4 weeks     Suspected Leukemia Cutis  --pathology from dermatology confirmed inflammatory changes  --now resolved with topical steroid cream     Abnormal Liver Function Tests  --ALT and AST and Alk phos have again  increased substantially since re-starting midostaurin.   --midostaurin started day 8 at 25 mg bid, monitoring liver enzymes closely and improved. Increasing Midostaurin to 50 mg bid 6/26. Stopped Midostaurin (7/3) due to new diagnosis of systolic heart failure EF 35%.  --6/22/18 liver US showing mild intrahepatic dilation, otherwise impression of hepatic steatosis.  Will hold off of MRCP at this time.    --permanently discontinued midostaurin   --LFTs wnl    Chemotherapy induced cardiomyopathy  --cardiology follow-up as outpatient in 3 months  --repeat 2D echo on 6/15 with preserved EF of 60%.  However 7/2/18 echo with reduced EF 30-35%  --Echo on 8/15/18 was within normal range EF of 60-65%    Rheumatologic issues  --Previously on steroids and methotrexate  --working with [facility] rheumatology    Dislocation of Jaw  --MRI complete 11/22/19 showing degenerative changes  --seen by ENT  --No issues now    Lack of appetite  --will refer patient to hem/onc nutrition    Constipation  --increasing prunes in diet  --add OTC stool softener or laxative as needed  --Controlled with current OTC laxatives      30 minutes were spent face to face with the patient and her family to discuss the disease, natural history, treatment options and survival statistics. I have provided the patient with an opportunity to ask questions and have all questions answered to her satisfaction.      she will return to the clinic in 4 weeks, but knows to call in the interim if symptoms change or should a problem arise.        Laquita Troy MD  Hematology and Medical Oncology  Bone Marrow Transplant  [facility]             BMT Chart Routing      Follow up with physician 1 month. Labs early afternoon and see me 1 hour later   Follow up with [facility]    Labs CBC and CMP   Lab interval:     Imaging None      Pharmacy appointment No pharmacy appointment needed      Other referrals No additional referrals needed

## 2022-04-12 DIAGNOSIS — C92.02 ACUTE MYELOID LEUKEMIA IN RELAPSE: Primary | ICD-10-CM

## 2022-05-10 ENCOUNTER — PATIENT MESSAGE (OUTPATIENT)
Dept: HEMATOLOGY/ONCOLOGY | Facility: CLINIC | Age: 72
End: 2022-05-10
Payer: MEDICARE

## 2022-05-10 DIAGNOSIS — M25.50 ARTHRALGIA, UNSPECIFIED JOINT: Primary | ICD-10-CM

## 2022-05-10 DIAGNOSIS — R11.0 NAUSEA: Primary | ICD-10-CM

## 2022-05-10 DIAGNOSIS — G89.3 CANCER RELATED PAIN: ICD-10-CM

## 2022-05-10 RX ORDER — ONDANSETRON HYDROCHLORIDE 8 MG/1
8 TABLET, FILM COATED ORAL EVERY 12 HOURS PRN
Qty: 60 TABLET | Refills: 1 | Status: SHIPPED | OUTPATIENT
Start: 2022-05-10 | End: 2023-09-14 | Stop reason: SDUPTHER

## 2022-05-10 RX ORDER — OXYCODONE HYDROCHLORIDE 10 MG/1
10 TABLET ORAL EVERY 6 HOURS PRN
Qty: 60 TABLET | Refills: 0 | Status: SHIPPED | OUTPATIENT
Start: 2022-05-10 | End: 2022-09-28 | Stop reason: SDUPTHER

## 2022-05-20 ENCOUNTER — LAB VISIT (OUTPATIENT)
Dept: LAB | Facility: HOSPITAL | Age: 72
End: 2022-05-20
Payer: MEDICARE

## 2022-05-20 ENCOUNTER — OFFICE VISIT (OUTPATIENT)
Dept: HEMATOLOGY/ONCOLOGY | Facility: CLINIC | Age: 72
End: 2022-05-20
Payer: MEDICARE

## 2022-05-20 VITALS
BODY MASS INDEX: 37.51 KG/M2 | WEIGHT: 203.81 LBS | HEIGHT: 62 IN | SYSTOLIC BLOOD PRESSURE: 130 MMHG | TEMPERATURE: 99 F | HEART RATE: 84 BPM | OXYGEN SATURATION: 96 % | DIASTOLIC BLOOD PRESSURE: 61 MMHG | RESPIRATION RATE: 16 BRPM

## 2022-05-20 DIAGNOSIS — C92.01 ACUTE MYELOID LEUKEMIA IN REMISSION: ICD-10-CM

## 2022-05-20 DIAGNOSIS — C92.02 ACUTE MYELOID LEUKEMIA IN RELAPSE: ICD-10-CM

## 2022-05-20 DIAGNOSIS — M25.50 ARTHRALGIA, UNSPECIFIED JOINT: ICD-10-CM

## 2022-05-20 DIAGNOSIS — M35.1 MCTD (MIXED CONNECTIVE TISSUE DISEASE): Primary | ICD-10-CM

## 2022-05-20 LAB
ALBUMIN SERPL BCP-MCNC: 3.6 G/DL (ref 3.5–5.2)
ALP SERPL-CCNC: 90 U/L (ref 55–135)
ALT SERPL W/O P-5'-P-CCNC: 21 U/L (ref 10–44)
ANION GAP SERPL CALC-SCNC: 9 MMOL/L (ref 8–16)
AST SERPL-CCNC: 22 U/L (ref 10–40)
BASOPHILS # BLD AUTO: 0.01 K/UL (ref 0–0.2)
BASOPHILS NFR BLD: 0.1 % (ref 0–1.9)
BILIRUB SERPL-MCNC: 0.3 MG/DL (ref 0.1–1)
BUN SERPL-MCNC: 12 MG/DL (ref 8–23)
CALCIUM SERPL-MCNC: 9.5 MG/DL (ref 8.7–10.5)
CHLORIDE SERPL-SCNC: 103 MMOL/L (ref 95–110)
CO2 SERPL-SCNC: 28 MMOL/L (ref 23–29)
CREAT SERPL-MCNC: 1 MG/DL (ref 0.5–1.4)
DIFFERENTIAL METHOD: ABNORMAL
EOSINOPHIL # BLD AUTO: 0 K/UL (ref 0–0.5)
EOSINOPHIL NFR BLD: 0.1 % (ref 0–8)
ERYTHROCYTE [DISTWIDTH] IN BLOOD BY AUTOMATED COUNT: 12.8 % (ref 11.5–14.5)
ERYTHROCYTE [SEDIMENTATION RATE] IN BLOOD BY WESTERGREN METHOD: 54 MM/HR (ref 0–36)
EST. GFR  (AFRICAN AMERICAN): >60 ML/MIN/1.73 M^2
EST. GFR  (NON AFRICAN AMERICAN): 56.8 ML/MIN/1.73 M^2
GLUCOSE SERPL-MCNC: 100 MG/DL (ref 70–110)
HCT VFR BLD AUTO: 40.1 % (ref 37–48.5)
HGB BLD-MCNC: 12.3 G/DL (ref 12–16)
IMM GRANULOCYTES # BLD AUTO: 0.04 K/UL (ref 0–0.04)
IMM GRANULOCYTES NFR BLD AUTO: 0.5 % (ref 0–0.5)
LYMPHOCYTES # BLD AUTO: 1.6 K/UL (ref 1–4.8)
LYMPHOCYTES NFR BLD: 21.6 % (ref 18–48)
MCH RBC QN AUTO: 31.8 PG (ref 27–31)
MCHC RBC AUTO-ENTMCNC: 30.7 G/DL (ref 32–36)
MCV RBC AUTO: 104 FL (ref 82–98)
MONOCYTES # BLD AUTO: 0.5 K/UL (ref 0.3–1)
MONOCYTES NFR BLD: 6.4 % (ref 4–15)
NEUTROPHILS # BLD AUTO: 5.4 K/UL (ref 1.8–7.7)
NEUTROPHILS NFR BLD: 71.3 % (ref 38–73)
NRBC BLD-RTO: 0 /100 WBC
PLATELET # BLD AUTO: 67 K/UL (ref 150–450)
PMV BLD AUTO: 10.2 FL (ref 9.2–12.9)
POTASSIUM SERPL-SCNC: 4.2 MMOL/L (ref 3.5–5.1)
PROT SERPL-MCNC: 6.2 G/DL (ref 6–8.4)
RBC # BLD AUTO: 3.87 M/UL (ref 4–5.4)
SODIUM SERPL-SCNC: 140 MMOL/L (ref 136–145)
WBC # BLD AUTO: 7.51 K/UL (ref 3.9–12.7)

## 2022-05-20 PROCEDURE — 99214 OFFICE O/P EST MOD 30 MIN: CPT | Mod: PBBFAC | Performed by: INTERNAL MEDICINE

## 2022-05-20 PROCEDURE — 36415 COLL VENOUS BLD VENIPUNCTURE: CPT | Performed by: NURSE PRACTITIONER

## 2022-05-20 PROCEDURE — 85025 COMPLETE CBC W/AUTO DIFF WBC: CPT | Performed by: NURSE PRACTITIONER

## 2022-05-20 PROCEDURE — 80053 COMPREHEN METABOLIC PANEL: CPT | Performed by: NURSE PRACTITIONER

## 2022-05-20 PROCEDURE — 85652 RBC SED RATE AUTOMATED: CPT | Performed by: INTERNAL MEDICINE

## 2022-05-20 PROCEDURE — 99999 PR PBB SHADOW E&M-EST. PATIENT-LVL IV: CPT | Mod: PBBFAC,,, | Performed by: INTERNAL MEDICINE

## 2022-05-20 PROCEDURE — 99215 OFFICE O/P EST HI 40 MIN: CPT | Mod: S$PBB,,, | Performed by: INTERNAL MEDICINE

## 2022-05-20 PROCEDURE — 99215 PR OFFICE/OUTPT VISIT, EST, LEVL V, 40-54 MIN: ICD-10-PCS | Mod: S$PBB,,, | Performed by: INTERNAL MEDICINE

## 2022-05-20 PROCEDURE — 99999 PR PBB SHADOW E&M-EST. PATIENT-LVL IV: ICD-10-PCS | Mod: PBBFAC,,, | Performed by: INTERNAL MEDICINE

## 2022-05-25 DIAGNOSIS — C92.01 ACUTE MYELOID LEUKEMIA IN REMISSION: Primary | ICD-10-CM

## 2022-05-25 NOTE — PROGRESS NOTES
Hematology and Medical Oncology   Follow Up Note     05/20/2022    Primary Oncologic Diagnosis: AML, FLT 3 + and NPMN1+    History of Present Ilness:   Sabrina Badillo) is a pleasant 72 y.o.female presents to clinic following 2 induction therapies for AML. She was in the hospital roughly 50 days to receive 7+3 and then FLAG JENNIFER.    Oncology History:   68 y.o. female admitted overnight for possible new AML. Came in from OSH with elevated WBC of 100.   05/25/2018: Pt is now on hydrea 2 grams BID, allopurinol 300 mg daily, and IVF. Pt may need rasburicase this weekend. She will undergo a BM bx and aspiration today. She is an induction candidate and will not be screened for research trials. She has anxiety for which she usually takes xanax, this was re-started.   05/26/2018 PICC placed. Reports she slept well last night. Anxiety improved with prn xanax. EF 65%, HIV and Hep panel negative. 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  6/12/2018: Day 15 of 7+3 induction, restaging BM bx done yesterday. On Midostaurin. Fever yesterday and BC with gram + cocci in clusters. On cefepime day 2 and will start Vanc today. Labial mucositis improving.   6/13/2018: Day 16 7+3. BC with staph aureus, identification pending. Surveillance blood cultures done today. Afebrile x 48 hours. On cefepime and Vanc. Labial mucositis resolved. No complaints of pain. Nausea controlled. No diarrhea. Primary complaint is fatigue.   6/14/20018: Day 17 of 7+3. Day 14 bone marrow results pending. BC with staph epidermis only sensitive to Vancomycin. Vanc day 3 and cefepime day 4. Vanc trough 12.2 last night. BP remains low but stable. Afebrile x 72 hours.   6/15/2018: Day 18 of 7+3. Day 14 bone marrow biopsy shows persistent disease with 15% blasts. Discussion with patient regarding treatment and she is deciding if she wants to proceed with re-induction vs outpatient maintenance. Temp of 100.4 overnight, unsustained. Day 5 Cefepime and Day 4 of  Vanc for staph epidermis. Repeat cultures NGTD. BP improved. Electrolytes (mag, phos, K and calcium) replaced aggressively this am and will repeat labs this afternoon. No complaints this am.   06/16/2018: Day 19 of 7+3. Day 2 of Flag-JENNIFER. Remains afebrile. Calcium remains low and have increased PO supplementation. Remains on vanc and aztreonam. Somewhat loopy feeling after receiving benadryl.   06/17/2018: Day 20 of 7+3. Day 3 of FLAG-JENNIFER. Multiple electrolyte abnormalities. New bilateral leg and feet swelling.   6/18/2018: Day 21 of 7+3 and Day 4 of FLAG JENNIFER. Tolerating well with some nausea controlled with Zofran. VSS, afebrile. ANC 1900 on Neupogen. Continues Vanc for staph epi bacteremia. Multiple electrolytes replaced today. Complains of edema to lower extremities, not improved after 20 of lasix yesterday. No sob or CP.   6/19/18: Day 22 of 7+3 and Day 5 of FLAG JENNIFER. VSS, afebrile, ANC 3900. Vanc trough elevated and dose adjusted this am. Lower extremity edema improved after 40 of lasix yesterday, net negative 300 cc I&O. Mild nausea, no abdominal pain or diarrhea. Poor appetite but no difficulty eating or taking pills.   6/20/18: Day 23 of 7+3 and Day 6 of FLAG JENNIFER. Patient complains of nausea and vomiting overnight and this am, especially when taking pills. Will add Zyprexa daily in addition to Zofran, compazine, and ativan PRN and will change meds to IV. 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 Afebrile.  No diarrhea, mouth sores or pain.  06/21/2018: Day 24 of 7+3 and Day 7 of FLAG JENNIFER. Nausea better controlled. Continued on Vanc.  6/22/2018: Day 25 of 7+3 and Day 8 of FLAG JENNIFER. Nausea improved some with Zyprexa but did have 1 episode of emesis overnight.continuing meds IV due to vomiting. Remains afebrile. ANC 0. Continues Vanc and Flagyl. Electrolytes replaced.   06/23/2018 : day 26 of 7+3 and Day 9 of FLAG JENNIFER.  Nausea persists,  worsened after taking pills so meds converted to IV.  Encouraged ambulation.  Continue with flagyl for leptotrichia goodfellowii.  RUQ US showed intrahepatic dilation, overall impression hepatic steatosis.  CBD 0.5cm.  No Gallbladder.    06/24/2018 : day 27 of 7+3 and Day 10 of FLAG JENNIFER.  Nausea persisting, able to tolerate some po pills.  Last day of flagyl (day 5).  Still pancytopenic. Appetite still low as po intake triggers nausea.  06/25/2018: day 28 of 7+3 and Day 11 of FLAG JENNIFER. Afebrile, ANC 0. Has completed Flagyl. Will decrease Vanc to 1000 mg q12, trough 19.9, will complete Vanc on 6/27. Liver enzymes stable on Midostaurin. VSS.   6/26/2018: day 29 of 7+3 and day 12 of FLAG JENNIFER. Liver enzymes improved and Midostaurin increased to full dose 50 mg bid. Nausea controlled, afebrile, VSS, NEON.  6/27/2018: day 30 of 7+3 and Day 13 of FLAG JENNIFER. Persistent nausea and emesis x 1 yesterday. Compazine changed to scheduled. Vanc ends today. Afebrile, VSS. Aggressive electrolyte replacement, low electrolytes possibly due to Midostaurin.   6/28/2018: day 31 of 7+3 and Day 14 of FLAG JENNIFER. Nausea improved with scheduled Compazine. Patient has agreed to start converting some IV meds to po. VSS, afebrile, electrolytes wnl today. Only complains of fatigue, new rash noted to upper back and chest and legs, papular rash mildly red.  6/29/2018: Day 32 of 7+3 and Day 15 of FLAG JENNIFER. Day 14 restaging bone marrow biopsy done at bedside today. Patient states nausea improved but she did have emesis last night after taking 9 pm pills. Rash improved. No mouth sores, diarrhea or pain.   7/2/2018: Day 35 of 7+3 and Day 18 of FLAG JENNIFER. Restaging BM bx results pending. Fluid overload over the weekend. Chest x-ray with pulmonary edema. Os sats down to 88%. Placed on O2 at 2 L NC, off O2 this am. Received lasix. Net negative 2.7 L today. 1 episode of nausea, no emesis. Reports anxiety relieved with PRN meds. Electrolytes improved,  afebrile, VSS.   7/3/2018: Day 36 of 7+3 and Day 19 of FLAG JENNIFER. Restaging Bm bx with GABRIEL. Cardiology consulted for abnormal echo, EF 30% with pulmonary HTN and severe L atrial enlargement. EKG with T wave abnormality, possible anterolateral ischemia and prolonged QTc of 530. Medications adjusted. Nausea controlled, no diarrhea. Electrolytes improved. On O2 as needed.   07/04/2018 : Day 37 of 7+3 and Day 20 of FLAG JENNIFER Diarrhea in AM, watery, no associated abdominal pain, nausea, or vomiting.    07/05/2018: Day 38 of 7+3 ane Day 21 of FLAG JENNIFER. No CP or SOB, cardiology following. On RA. All meds changed to po, denies nausea or vomiting and no diarrhea. VSS, afebrile.   7/6/2018: Day 22 of FLAG JENNIFER. Continues to tolerate po meds with no nausea. Afebrile. Awaiting count recovery for discharge.  7/7/2018-/8/2018: Day 40/41 of 7+3, Day 23/24 of FLAG JENNIFER.  Improving clinically.  Started on mag oxide per patient request.  Labs showing signs of ANC recovery.  7/9/2018: Day 25 FLAG JENNIFER, , continues Neupogen. Received platelets today. Afebrile, VSS. Tolerating all oral meds with no nausea or vomiting. Only complains of fatigue.   7/10/2018: Day 26 FLAG JENNIFER,  today. Had 2 episodes of vomiting and diarrhea last night. Feeling better. Afebrile, VSS.   7/11/2018: Day 27 FLAG JENNIFER,  today. No vomiting or diarrhea overnight and no nausea. Afebrile, VSS. Complains of back pain (bone pain) suspect due to count recovery. Relieved with oxy IR and Zyrtec started.  07/12/2018: Day 28 flag jennifer, engrafted today with ANC of 730. Back pain improved with zyrtec. Scheduled for IT chemo tomorrow at 1:30 pm and discharge home thereafter. Will likely need plt transfusion prior to IT chemo tomorrow   7/13/2018: Day 29 of FLAG JENNIFER. ANC up to 1300 today. Transfusing platelets today prior to LP for IT chemo. Patient continues to complain of bone pain, mostly to back and legs. No nausea, diarrhea, sob. Afebrile and VSS.    --hospitalized 7/23-7/25 for C.diff diarrhea, neutropenic fever.  While inpatient she developed pink blisters on her palm that were concerning for relapse.  --palm biopsy on 7/26/18 for suspicion of leukemia cutis was also negative for sign of relapse  --bone marrow biopsy 7/31/18 was negative for signs of relapse   --Admitted on 8/14/18 for HiDAC#1  --Admitted on 9/11/18 for HiDAC#2  --Admitted on 10/16/18 for HiDAC#3  --Post consolidation bone marrow biopsy on 12/27/2018: AML FISH (BM) Interpretation: The result is within normal limits for the AML FISH panel. No evidence of residual disease.  --Bone Marrow Biopsy on 5/9/19 NO MORPHOLOGIC EVIDENCE OF RESIDUAL ACUTE MYELOID LEUKEMIA. RECOMMEND NGS STUDY TO RULE OUT RESIDUAL DISEASE. deletion 20q  --Bone Marrow Biopsy on 9/23/2020: CELLULARITY=40-50%, TRILINEAGE HEMATOPOIETIC ACTIVITY (M/E= 0.6:1). ERYTHROID HYPERPLASIA, DYSERYTHROPOIESIS, AND INCREASED BLAST (11%). SEE COMMENT.  FOCAL GRADE 1 RETICULAR FIBROSIS.  --Decitabine + Venetoclax: C1D1: 10/12/2020, C2D1: 11/9  --Bone marrow biopsy on 2/9/2021: VARIABLY CELLULAR MARROW WITH DYSERYTHROPOIESIS, DYSMEGAKARYOPOIESIS AND  MARKED MYELOID HYPOPLASIA.NO MORPHOLOGIC EVIDENCE OF RESIDUAL ACUTE MYELOID LEUKEMIA.    Interval History:   Mrs. Mac is a 72 y.o. patient who presents today with her .  had xrt seeds planted for early stage prostate cancer.      Overall doing well. Having an ongoing flare of arthritis, not able to walk any distance. She is happy to hear about the stability in her counts.      Continues to use a walker for long distances. Enquiring about a scooter.     Past Medical History:   Past Medical History:   Diagnosis Date    Cancer 03/2018    AML    CHF (congestive heart failure)     Diarrhea 9/14/2018    Encounter for blood transfusion        Current Medications:   Current Outpatient Medications   Medication Sig    acetaminophen (TYLENOL) 500 MG tablet Take 500 mg by mouth every  6 (six) hours as needed.    ALPRAZolam (XANAX) 2 MG Tab Take 1 tablet (2 mg total) by mouth nightly as needed (insomnia).    diphenhydrAMINE (BANOPHEN) 50 MG capsule Take 1 capsule (50 mg total) by mouth nightly as needed for Itching or Insomnia.    EScitalopram oxalate (LEXAPRO) 20 MG tablet TAKE ONE TABLET BY MOUTH IN THE EVENING    methocarbamoL (ROBAXIN) 500 MG Tab Take 1 tablet (500 mg total) by mouth 4 (four) times daily.    metoprolol succinate (TOPROL-XL) 25 MG 24 hr tablet Take 1 tablet (25 mg total) by mouth once daily.    omeprazole (PRILOSEC) 40 MG capsule TAKE ONE CAPSULE BY MOUTH EVERY MORNING 30-60 MINUTES BEFORE MEALS    oxyCODONE (ROXICODONE) 10 mg Tab immediate release tablet Take 1 tablet (10 mg total) by mouth every 6 (six) hours as needed (pain).    predniSONE (DELTASONE) 20 MG tablet Take 1 tablet (20 mg total) by mouth daily as needed (as needed).    doxycycline (DORYX) 100 MG EC tablet Take 1 tablet (100 mg total) by mouth every 12 (twelve) hours. (Patient not taking: Reported on 5/20/2022)    famotidine (PEPCID) 20 MG tablet Take 20 mg by mouth.    fluconazole (DIFLUCAN) 200 MG Tab     lactulose (CHRONULAC) 20 gram/30 mL Soln Take 23 mLs (15 g total) by mouth 3 (three) times daily as needed. (Patient not taking: No sig reported)    LORazepam (ATIVAN) 1 MG tablet Take 1 tablet (1 mg total) by mouth 2 (two) times daily. (Patient not taking: No sig reported)    ondansetron (ZOFRAN) 8 MG tablet Take 1 tablet (8 mg total) by mouth every 12 (twelve) hours as needed for Nausea. (Patient not taking: Reported on 5/20/2022)    polyethylene glycol (GLYCOLAX) 17 gram PwPk Take 17 g by mouth once daily. (Patient not taking: No sig reported)    pregabalin (LYRICA) 75 MG capsule Take 1 capsule (75 mg total) by mouth every evening. (Patient not taking: No sig reported)    senna-docusate 8.6-50 mg (PERICOLACE) 8.6-50 mg per tablet Take 1 tablet by mouth once daily. (Patient not taking: No  sig reported)    venetoclax (VENCLEXTA) 100 mg Tab Take 2 tablets (200 mg total) by mouth once daily. (Patient not taking: No sig reported)     No current facility-administered medications for this visit.     ALLERGIES:   Review of patient's allergies indicates:   Allergen Reactions    Methotrexate analogues      Elevated Liver Enzyme    Bactrim [sulfamethoxazole-trimethoprim] Nausea And Vomiting and Rash       Review of Systems:     Review of Systems   Constitutional: Positive for fatigue (improving). Negative for appetite change, chills, diaphoresis, fever and unexpected weight change.   HENT:   Negative for hearing loss, mouth sores, nosebleeds, sore throat, trouble swallowing and voice change.    Eyes: Negative for eye problems and icterus.   Respiratory: Negative for chest tightness, cough, hemoptysis, shortness of breath and wheezing.    Cardiovascular: Negative for chest pain, leg swelling and palpitations.   Gastrointestinal: Negative for abdominal distention, abdominal pain, blood in stool, diarrhea, nausea and vomiting.   Endocrine: Negative for hot flashes.   Genitourinary: Negative for bladder incontinence, difficulty urinating, dysuria and hematuria.    Musculoskeletal: Positive for arthralgias and myalgias. Negative for back pain, flank pain, gait problem, neck pain and neck stiffness.   Skin: Negative for itching, rash and wound.   Neurological: Negative for dizziness, extremity weakness, gait problem, headaches, numbness, seizures and speech difficulty.   Hematological: Negative for adenopathy. Does not bruise/bleed easily.   Psychiatric/Behavioral: Negative for confusion, depression and sleep disturbance. The patient is not nervous/anxious.       Vitals:    05/20/22 1252   BP: 130/61   Pulse: 84   Resp: 16   Temp: 98.6 °F (37 °C)       Physical Exam:   Physical Exam  Vitals reviewed.   Constitutional:       Appearance: She is well-developed.   HENT:      Head: Normocephalic and atraumatic.       Right Ear: External ear normal.      Left Ear: External ear normal.      Mouth/Throat:      Pharynx: No posterior oropharyngeal erythema.   Eyes:      Conjunctiva/sclera: Conjunctivae normal.   Cardiovascular:      Rate and Rhythm: Normal rate and regular rhythm.      Heart sounds: Normal heart sounds. No murmur heard.     Pulmonary:      Effort: Pulmonary effort is normal. No respiratory distress.      Breath sounds: Normal breath sounds. No stridor. No wheezing or rales.   Abdominal:      General: Bowel sounds are normal. There is no distension.      Palpations: Abdomen is soft.      Tenderness: There is no abdominal tenderness.   Musculoskeletal:         General: Normal range of motion.      Cervical back: Normal range of motion.      Right lower leg: No edema.      Left lower leg: No edema.   Skin:     General: Skin is warm and dry.      Findings: No rash.   Neurological:      Mental Status: She is alert and oriented to person, place, and time.   Psychiatric:         Behavior: Behavior normal.         Thought Content: Thought content normal.         Judgment: Judgment normal.         ECOG Performance Status: (foot note - ECOG PS provided by Eastern Cooperative Oncology Group) 2 - Symptomatic, <50% confined to bed    Karnofsky Performance Score:  80%- Normal Activity with Effort: Some Symptoms of Disease    Labs:   Lab Results   Component Value Date    WBC 7.51 05/20/2022    HGB 12.3 05/20/2022    HCT 40.1 05/20/2022    PLT 67 (L) 05/20/2022    ALT 21 05/20/2022    AST 22 05/20/2022     05/20/2022    K 4.2 05/20/2022     05/20/2022    CREATININE 1.0 05/20/2022    BUN 12 05/20/2022    CO2 28 05/20/2022    TSH 1.913 10/17/2019    INR 0.9 01/31/2021    HGBA1C 6.0 (H) 01/31/2021       Imaging: previous imaging has been reviewed    Assessment and Plan:     Mrs. Mac is a 72 year old female with AML in second remission.     AML  -- Admitted from [facility] on 5/25/18 early AM with WBC around 100s,  for suspected new non-M3 AML  --lo res HLA typing on 5/26/18 and hi res on 5/27/18 done in anticipation of possible need for future transplant   --Pt with two daughters, two full sisters (in their mid 60s, one with brain aneurysm hx, other one without any medical issues), and one full brother (59 y/o healthy).  --NPM1 +, CEBPA (-), FLT3 (+).  On Midostaurin 50 mg PO BID until Day 14 (held starting 6/8 to 6/11). Stopped when FLAG JENNIFER re-induction started. Restarted Midostaurin at 25 mg bid on day 8 of FLAG JENNIFER induction (6/22), increase to full dose 50 mg bid (6/26) as improvement in liver enzymes. Stopped 7/3/18 due to abnormal cardiac echo.  --day 14 bone marrow biopsy completed 6/11 showing persistent disease with 15% CD 34 positive blasts  --Day 60 of FLAG-JENNIFER, tolerating without difficulty except nausea/vomiting  --day 14 restaging bone marrow biopsy done 6/29 without complication, results with no evidence of AML, FLT 3 negative, will need repeat marrow at count recovery outpatient   --recovery marrow showed no evidence of disease  --HiDAC #1 on 8/14/18, HiDAC#2 on 9/11/18  --Cycle 3 delayed 1 week due to pancytopenia. Started on 10/16/18  --recovery bone marrow showed no signs of residual leukemia  --Bone marrow biopsy done last week reveals early relapse. 11% blasts. We discussed the possibility of re-induction, but she is adamantly against. She does not want to be admitted to the hospital.  --Plan for single agent Gilteritinib 120mg has changed based on next gen sequencing that reports FLT-3 negative at this time  --Completed 4 cycles of Decitabine + Venetoclax  --Most recent marrow shows no evidence of disease  --Will continue with supportive care      Pancytopenia  --Transfuse for Hgb <7, Plt <10K or bleeding  --continue ppx acyclovir, fluconazole and levaquin due to neutropenia  --Plan to check labs q 4 weeks     Suspected Leukemia Cutis  --pathology from dermatology confirmed inflammatory changes  --now  resolved with topical steroid cream     Abnormal Liver Function Tests  --ALT and AST and Alk phos have again increased substantially since re-starting midostaurin.   --midostaurin started day 8 at 25 mg bid, monitoring liver enzymes closely and improved. Increasing Midostaurin to 50 mg bid 6/26. Stopped Midostaurin (7/3) due to new diagnosis of systolic heart failure EF 35%.  --6/22/18 liver US showing mild intrahepatic dilation, otherwise impression of hepatic steatosis.  Will hold off of MRCP at this time.    --permanently discontinued midostaurin   --LFTs wnl    Chemotherapy induced cardiomyopathy  --cardiology follow-up as outpatient in 3 months  --repeat 2D echo on 6/15 with preserved EF of 60%.  However 7/2/18 echo with reduced EF 30-35%  --Echo on 8/15/18 was within normal range EF of 60-65%    Rheumatologic issues  --Previously on steroids and methotrexate  --working with [facility]    Dislocation of Jaw  --MRI complete 11/22/19 showing degenerative changes  --seen by ENT  --No issues now    Lack of appetite  --will refer patient to hem/onc nutrition    Constipation  --increasing prunes in diet  --add OTC stool softener or laxative as needed  --Controlled with current OTC laxatives      30 minutes were spent face to face with the patient and her family to discuss the disease, natural history, treatment options and survival statistics. I have provided the patient with an opportunity to ask questions and have all questions answered to her satisfaction.      she will return to the clinic in 4 weeks, but knows to call in the interim if symptoms change or should a problem arise.        Laquita Troy MD  Hematology and Medical Oncology  Bone Marrow Transplant  [facility]             BMT Chart Routing      Follow up with physician 1 month. Labs early afternoon and see me 1 hour later   Follow up with SHORTY    Labs CBC and CMP   Lab interval:     Imaging None      Pharmacy appointment No pharmacy  appointment needed      Other referrals No additional referrals needed

## 2022-05-28 ENCOUNTER — PATIENT MESSAGE (OUTPATIENT)
Dept: HEMATOLOGY/ONCOLOGY | Facility: CLINIC | Age: 72
End: 2022-05-28
Payer: MEDICARE

## 2022-05-28 DIAGNOSIS — M19.90 ARTHRITIS: ICD-10-CM

## 2022-05-30 RX ORDER — PREDNISONE 20 MG/1
20 TABLET ORAL DAILY PRN
Qty: 30 TABLET | Refills: 3 | Status: SHIPPED | OUTPATIENT
Start: 2022-05-30 | End: 2022-12-14 | Stop reason: SDUPTHER

## 2022-06-03 ENCOUNTER — PATIENT MESSAGE (OUTPATIENT)
Dept: HEMATOLOGY/ONCOLOGY | Facility: CLINIC | Age: 72
End: 2022-06-03
Payer: MEDICARE

## 2022-06-03 DIAGNOSIS — C92.01 ACUTE MYELOID LEUKEMIA IN REMISSION: Primary | ICD-10-CM

## 2022-06-03 DIAGNOSIS — R26.89 DECREASED MOBILITY: ICD-10-CM

## 2022-06-03 DIAGNOSIS — G89.3 CANCER RELATED PAIN: ICD-10-CM

## 2022-06-03 DIAGNOSIS — M35.1 MCTD (MIXED CONNECTIVE TISSUE DISEASE): ICD-10-CM

## 2022-06-16 ENCOUNTER — PATIENT MESSAGE (OUTPATIENT)
Dept: HEMATOLOGY/ONCOLOGY | Facility: CLINIC | Age: 72
End: 2022-06-16
Payer: MEDICARE

## 2022-06-16 DIAGNOSIS — K21.9 LPRD (LARYNGOPHARYNGEAL REFLUX DISEASE): ICD-10-CM

## 2022-06-16 DIAGNOSIS — C92.02 ACUTE MYELOID LEUKEMIA IN RELAPSE: ICD-10-CM

## 2022-06-16 RX ORDER — ALPRAZOLAM 2 MG/1
2 TABLET ORAL NIGHTLY PRN
Qty: 30 TABLET | Refills: 0 | Status: SHIPPED | OUTPATIENT
Start: 2022-06-16 | End: 2022-08-10 | Stop reason: SDUPTHER

## 2022-06-16 RX ORDER — OMEPRAZOLE 40 MG/1
40 CAPSULE, DELAYED RELEASE ORAL EVERY MORNING
Qty: 30 CAPSULE | Refills: 2 | Status: SHIPPED | OUTPATIENT
Start: 2022-06-16 | End: 2022-12-16 | Stop reason: SDUPTHER

## 2022-06-29 NOTE — PLAN OF CARE
Problem: Patient Care Overview  Goal: Plan of Care Review  Outcome: Ongoing (interventions implemented as appropriate)  AAOx4, bed in low and locked position, call bell within reach, voids via bedside commode, no falls. Patient sustained labored breathing of 22-26 a minute throughout shift. She was on 2L NC and her O2 SAT was 92-93%. Patient was bumped up to 3L NC and O2 SAT is now 95%. Normal saline at 100 ml/hr continued. Complaints of anxious feelings were verbalized; medication administered for anxiety. No complaints of pain or nausea. Patient is stable, will continue to monitor.        Clofazimine Counseling:  I discussed with the patient the risks of clofazimine including but not limited to skin and eye pigmentation, liver damage, nausea/vomiting, gastrointestinal bleeding and allergy.

## 2022-07-13 ENCOUNTER — OFFICE VISIT (OUTPATIENT)
Dept: HEMATOLOGY/ONCOLOGY | Facility: CLINIC | Age: 72
End: 2022-07-13
Payer: MEDICARE

## 2022-07-13 ENCOUNTER — LAB VISIT (OUTPATIENT)
Dept: LAB | Facility: HOSPITAL | Age: 72
End: 2022-07-13
Payer: MEDICARE

## 2022-07-13 VITALS
HEART RATE: 97 BPM | OXYGEN SATURATION: 95 % | BODY MASS INDEX: 37.34 KG/M2 | SYSTOLIC BLOOD PRESSURE: 123 MMHG | RESPIRATION RATE: 16 BRPM | WEIGHT: 202.94 LBS | DIASTOLIC BLOOD PRESSURE: 62 MMHG | HEIGHT: 62 IN | TEMPERATURE: 99 F

## 2022-07-13 DIAGNOSIS — D64.9 SYMPTOMATIC ANEMIA: ICD-10-CM

## 2022-07-13 DIAGNOSIS — C93.01 ACUTE MONOCYTIC LEUKEMIA IN REMISSION: Primary | ICD-10-CM

## 2022-07-13 DIAGNOSIS — M35.1 MCTD (MIXED CONNECTIVE TISSUE DISEASE): ICD-10-CM

## 2022-07-13 DIAGNOSIS — C92.01 ACUTE MYELOID LEUKEMIA IN REMISSION: ICD-10-CM

## 2022-07-13 LAB
ALBUMIN SERPL BCP-MCNC: 3.5 G/DL (ref 3.5–5.2)
ALP SERPL-CCNC: 84 U/L (ref 55–135)
ALT SERPL W/O P-5'-P-CCNC: 20 U/L (ref 10–44)
ANION GAP SERPL CALC-SCNC: 12 MMOL/L (ref 8–16)
ANISOCYTOSIS BLD QL SMEAR: SLIGHT
AST SERPL-CCNC: 21 U/L (ref 10–40)
BASOPHILS # BLD AUTO: 0.01 K/UL (ref 0–0.2)
BASOPHILS NFR BLD: 0.1 % (ref 0–1.9)
BILIRUB SERPL-MCNC: 0.4 MG/DL (ref 0.1–1)
BUN SERPL-MCNC: 12 MG/DL (ref 8–23)
CALCIUM SERPL-MCNC: 9.5 MG/DL (ref 8.7–10.5)
CHLORIDE SERPL-SCNC: 102 MMOL/L (ref 95–110)
CO2 SERPL-SCNC: 24 MMOL/L (ref 23–29)
CREAT SERPL-MCNC: 1 MG/DL (ref 0.5–1.4)
DIFFERENTIAL METHOD: ABNORMAL
EOSINOPHIL # BLD AUTO: 0 K/UL (ref 0–0.5)
EOSINOPHIL NFR BLD: 0.3 % (ref 0–8)
ERYTHROCYTE [DISTWIDTH] IN BLOOD BY AUTOMATED COUNT: 13.3 % (ref 11.5–14.5)
EST. GFR  (AFRICAN AMERICAN): >60 ML/MIN/1.73 M^2
EST. GFR  (NON AFRICAN AMERICAN): 56.8 ML/MIN/1.73 M^2
GLUCOSE SERPL-MCNC: 99 MG/DL (ref 70–110)
HCT VFR BLD AUTO: 40.7 % (ref 37–48.5)
HGB BLD-MCNC: 12.6 G/DL (ref 12–16)
HYPOCHROMIA BLD QL SMEAR: ABNORMAL
IMM GRANULOCYTES # BLD AUTO: 0.03 K/UL (ref 0–0.04)
IMM GRANULOCYTES NFR BLD AUTO: 0.4 % (ref 0–0.5)
LYMPHOCYTES # BLD AUTO: 2 K/UL (ref 1–4.8)
LYMPHOCYTES NFR BLD: 27.6 % (ref 18–48)
MCH RBC QN AUTO: 32.1 PG (ref 27–31)
MCHC RBC AUTO-ENTMCNC: 31 G/DL (ref 32–36)
MCV RBC AUTO: 104 FL (ref 82–98)
MONOCYTES # BLD AUTO: 0.5 K/UL (ref 0.3–1)
MONOCYTES NFR BLD: 7.2 % (ref 4–15)
NEUTROPHILS # BLD AUTO: 4.7 K/UL (ref 1.8–7.7)
NEUTROPHILS NFR BLD: 64.4 % (ref 38–73)
NRBC BLD-RTO: 0 /100 WBC
OVALOCYTES BLD QL SMEAR: ABNORMAL
PLATELET # BLD AUTO: 79 K/UL (ref 150–450)
PLATELET BLD QL SMEAR: ABNORMAL
PMV BLD AUTO: 11 FL (ref 9.2–12.9)
POIKILOCYTOSIS BLD QL SMEAR: SLIGHT
POTASSIUM SERPL-SCNC: 3.7 MMOL/L (ref 3.5–5.1)
PROT SERPL-MCNC: 6.2 G/DL (ref 6–8.4)
RBC # BLD AUTO: 3.93 M/UL (ref 4–5.4)
SODIUM SERPL-SCNC: 138 MMOL/L (ref 136–145)
WBC # BLD AUTO: 7.25 K/UL (ref 3.9–12.7)
WBC TOXIC VACUOLES BLD QL SMEAR: PRESENT

## 2022-07-13 PROCEDURE — 99215 PR OFFICE/OUTPT VISIT, EST, LEVL V, 40-54 MIN: ICD-10-PCS | Mod: S$PBB,,, | Performed by: INTERNAL MEDICINE

## 2022-07-13 PROCEDURE — 85025 COMPLETE CBC W/AUTO DIFF WBC: CPT | Performed by: INTERNAL MEDICINE

## 2022-07-13 PROCEDURE — 99999 PR PBB SHADOW E&M-EST. PATIENT-LVL IV: ICD-10-PCS | Mod: PBBFAC,,, | Performed by: INTERNAL MEDICINE

## 2022-07-13 PROCEDURE — 36415 COLL VENOUS BLD VENIPUNCTURE: CPT | Performed by: INTERNAL MEDICINE

## 2022-07-13 PROCEDURE — 99214 OFFICE O/P EST MOD 30 MIN: CPT | Mod: PBBFAC | Performed by: INTERNAL MEDICINE

## 2022-07-13 PROCEDURE — 99999 PR PBB SHADOW E&M-EST. PATIENT-LVL IV: CPT | Mod: PBBFAC,,, | Performed by: INTERNAL MEDICINE

## 2022-07-13 PROCEDURE — 99215 OFFICE O/P EST HI 40 MIN: CPT | Mod: S$PBB,,, | Performed by: INTERNAL MEDICINE

## 2022-07-13 PROCEDURE — 80053 COMPREHEN METABOLIC PANEL: CPT | Performed by: INTERNAL MEDICINE

## 2022-07-13 NOTE — PROGRESS NOTES
Hematology and Medical Oncology   Follow Up Note     07/13/2022    Primary Oncologic Diagnosis: AML, FLT 3 + and NPMN1+    History of Present Ilness:   Sabrina Badillo) is a pleasant 72 y.o.female presents to clinic following 2 induction therapies for AML. She was in the hospital roughly 50 days to receive 7+3 and then FLAG JENNIFER.    Oncology History:   68 y.o. female admitted overnight for possible new AML. Came in from OSH with elevated WBC of 100.   05/25/2018: Pt is now on hydrea 2 grams BID, allopurinol 300 mg daily, and IVF. Pt may need rasburicase this weekend. She will undergo a BM bx and aspiration today. She is an induction candidate and will not be screened for research trials. She has anxiety for which she usually takes xanax, this was re-started.   05/26/2018 PICC placed. Reports she slept well last night. Anxiety improved with prn xanax. EF 65%, HIV and Hep panel negative. 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  6/12/2018: Day 15 of 7+3 induction, restaging BM bx done yesterday. On Midostaurin. Fever yesterday and BC with gram + cocci in clusters. On cefepime day 2 and will start Vanc today. Labial mucositis improving.   6/13/2018: Day 16 7+3. BC with staph aureus, identification pending. Surveillance blood cultures done today. Afebrile x 48 hours. On cefepime and Vanc. Labial mucositis resolved. No complaints of pain. Nausea controlled. No diarrhea. Primary complaint is fatigue.   6/14/20018: Day 17 of 7+3. Day 14 bone marrow results pending. BC with staph epidermis only sensitive to Vancomycin. Vanc day 3 and cefepime day 4. Vanc trough 12.2 last night. BP remains low but stable. Afebrile x 72 hours.   6/15/2018: Day 18 of 7+3. Day 14 bone marrow biopsy shows persistent disease with 15% blasts. Discussion with patient regarding treatment and she is deciding if she wants to proceed with re-induction vs outpatient maintenance. Temp of 100.4 overnight, unsustained. Day 5 Cefepime and Day 4 of  Vanc for staph epidermis. Repeat cultures NGTD. BP improved. Electrolytes (mag, phos, K and calcium) replaced aggressively this am and will repeat labs this afternoon. No complaints this am.   06/16/2018: Day 19 of 7+3. Day 2 of Flag-JENNIFER. Remains afebrile. Calcium remains low and have increased PO supplementation. Remains on vanc and aztreonam. Somewhat loopy feeling after receiving benadryl.   06/17/2018: Day 20 of 7+3. Day 3 of FLAG-JENNIFER. Multiple electrolyte abnormalities. New bilateral leg and feet swelling.   6/18/2018: Day 21 of 7+3 and Day 4 of FLAG JENNIFER. Tolerating well with some nausea controlled with Zofran. VSS, afebrile. ANC 1900 on Neupogen. Continues Vanc for staph epi bacteremia. Multiple electrolytes replaced today. Complains of edema to lower extremities, not improved after 20 of lasix yesterday. No sob or CP.   6/19/18: Day 22 of 7+3 and Day 5 of FLAG JENNIFER. VSS, afebrile, ANC 3900. Vanc trough elevated and dose adjusted this am. Lower extremity edema improved after 40 of lasix yesterday, net negative 300 cc I&O. Mild nausea, no abdominal pain or diarrhea. Poor appetite but no difficulty eating or taking pills.   6/20/18: Day 23 of 7+3 and Day 6 of FLAG JENNIFER. Patient complains of nausea and vomiting overnight and this am, especially when taking pills. Will add Zyprexa daily in addition to Zofran, compazine, and ativan PRN and will change meds to IV. 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 Afebrile.  No diarrhea, mouth sores or pain.  06/21/2018: Day 24 of 7+3 and Day 7 of FLAG JENNIFER. Nausea better controlled. Continued on Vanc.  6/22/2018: Day 25 of 7+3 and Day 8 of FLAG JENNIFER. Nausea improved some with Zyprexa but did have 1 episode of emesis overnight.continuing meds IV due to vomiting. Remains afebrile. ANC 0. Continues Vanc and Flagyl. Electrolytes replaced.   06/23/2018 : day 26 of 7+3 and Day 9 of FLAG JENNIFER.  Nausea persists,  worsened after taking pills so meds converted to IV.  Encouraged ambulation.  Continue with flagyl for leptotrichia goodfellowii.  RUQ US showed intrahepatic dilation, overall impression hepatic steatosis.  CBD 0.5cm.  No Gallbladder.    06/24/2018 : day 27 of 7+3 and Day 10 of FLAG JENNIFER.  Nausea persisting, able to tolerate some po pills.  Last day of flagyl (day 5).  Still pancytopenic. Appetite still low as po intake triggers nausea.  06/25/2018: day 28 of 7+3 and Day 11 of FLAG JENNIFER. Afebrile, ANC 0. Has completed Flagyl. Will decrease Vanc to 1000 mg q12, trough 19.9, will complete Vanc on 6/27. Liver enzymes stable on Midostaurin. VSS.   6/26/2018: day 29 of 7+3 and day 12 of FLAG JENNIFER. Liver enzymes improved and Midostaurin increased to full dose 50 mg bid. Nausea controlled, afebrile, VSS, NEON.  6/27/2018: day 30 of 7+3 and Day 13 of FLAG JENNIFER. Persistent nausea and emesis x 1 yesterday. Compazine changed to scheduled. Vanc ends today. Afebrile, VSS. Aggressive electrolyte replacement, low electrolytes possibly due to Midostaurin.   6/28/2018: day 31 of 7+3 and Day 14 of FLAG JENNIFER. Nausea improved with scheduled Compazine. Patient has agreed to start converting some IV meds to po. VSS, afebrile, electrolytes wnl today. Only complains of fatigue, new rash noted to upper back and chest and legs, papular rash mildly red.  6/29/2018: Day 32 of 7+3 and Day 15 of FLAG JENNIFER. Day 14 restaging bone marrow biopsy done at bedside today. Patient states nausea improved but she did have emesis last night after taking 9 pm pills. Rash improved. No mouth sores, diarrhea or pain.   7/2/2018: Day 35 of 7+3 and Day 18 of FLAG JENNIFER. Restaging BM bx results pending. Fluid overload over the weekend. Chest x-ray with pulmonary edema. Os sats down to 88%. Placed on O2 at 2 L NC, off O2 this am. Received lasix. Net negative 2.7 L today. 1 episode of nausea, no emesis. Reports anxiety relieved with PRN meds. Electrolytes improved,  afebrile, VSS.   7/3/2018: Day 36 of 7+3 and Day 19 of FLAG JENNIFER. Restaging Bm bx with GABRIEL. Cardiology consulted for abnormal echo, EF 30% with pulmonary HTN and severe L atrial enlargement. EKG with T wave abnormality, possible anterolateral ischemia and prolonged QTc of 530. Medications adjusted. Nausea controlled, no diarrhea. Electrolytes improved. On O2 as needed.   07/04/2018 : Day 37 of 7+3 and Day 20 of FLAG JENNIFER Diarrhea in AM, watery, no associated abdominal pain, nausea, or vomiting.    07/05/2018: Day 38 of 7+3 ane Day 21 of FLAG JENNIFER. No CP or SOB, cardiology following. On RA. All meds changed to po, denies nausea or vomiting and no diarrhea. VSS, afebrile.   7/6/2018: Day 22 of FLAG JENNIFER. Continues to tolerate po meds with no nausea. Afebrile. Awaiting count recovery for discharge.  7/7/2018-/8/2018: Day 40/41 of 7+3, Day 23/24 of FLAG JENNIFER.  Improving clinically.  Started on mag oxide per patient request.  Labs showing signs of ANC recovery.  7/9/2018: Day 25 FLAG JENNIFER, , continues Neupogen. Received platelets today. Afebrile, VSS. Tolerating all oral meds with no nausea or vomiting. Only complains of fatigue.   7/10/2018: Day 26 FLAG JENNIFER,  today. Had 2 episodes of vomiting and diarrhea last night. Feeling better. Afebrile, VSS.   7/11/2018: Day 27 FLAG JENNIFER,  today. No vomiting or diarrhea overnight and no nausea. Afebrile, VSS. Complains of back pain (bone pain) suspect due to count recovery. Relieved with oxy IR and Zyrtec started.  07/12/2018: Day 28 flag jennifer, engrafted today with ANC of 730. Back pain improved with zyrtec. Scheduled for IT chemo tomorrow at 1:30 pm and discharge home thereafter. Will likely need plt transfusion prior to IT chemo tomorrow   7/13/2018: Day 29 of FLAG JENNIFER. ANC up to 1300 today. Transfusing platelets today prior to LP for IT chemo. Patient continues to complain of bone pain, mostly to back and legs. No nausea, diarrhea, sob. Afebrile and VSS.    --hospitalized 7/23-7/25 for C.diff diarrhea, neutropenic fever.  While inpatient she developed pink blisters on her palm that were concerning for relapse.  --palm biopsy on 7/26/18 for suspicion of leukemia cutis was also negative for sign of relapse  --bone marrow biopsy 7/31/18 was negative for signs of relapse   --Admitted on 8/14/18 for HiDAC#1  --Admitted on 9/11/18 for HiDAC#2  --Admitted on 10/16/18 for HiDAC#3  --Post consolidation bone marrow biopsy on 12/27/2018: AML FISH (BM) Interpretation: The result is within normal limits for the AML FISH panel. No evidence of residual disease.  --Bone Marrow Biopsy on 5/9/19 NO MORPHOLOGIC EVIDENCE OF RESIDUAL ACUTE MYELOID LEUKEMIA. RECOMMEND NGS STUDY TO RULE OUT RESIDUAL DISEASE. deletion 20q  --Bone Marrow Biopsy on 9/23/2020: CELLULARITY=40-50%, TRILINEAGE HEMATOPOIETIC ACTIVITY (M/E= 0.6:1). ERYTHROID HYPERPLASIA, DYSERYTHROPOIESIS, AND INCREASED BLAST (11%). SEE COMMENT.  FOCAL GRADE 1 RETICULAR FIBROSIS.  --Decitabine + Venetoclax: C1D1: 10/12/2020, C2D1: 11/9  --Bone marrow biopsy on 2/9/2021: VARIABLY CELLULAR MARROW WITH DYSERYTHROPOIESIS, DYSMEGAKARYOPOIESIS AND  MARKED MYELOID HYPOPLASIA.NO MORPHOLOGIC EVIDENCE OF RESIDUAL ACUTE MYELOID LEUKEMIA.    Interval History:   Mrs. Mac is a 72 y.o. patient who presents today with her .  had xrt seeds planted for early stage prostate cancer.      Overall doing well. Having an ongoing flare of arthritis, not able to walk any distance. She is happy to hear about the stability in her counts.       Continues to use a walker for long distances. Over all feels very good. Energy has improved.    Past Medical History:   Past Medical History:   Diagnosis Date    Cancer 03/2018    AML    CHF (congestive heart failure)     Diarrhea 9/14/2018    Encounter for blood transfusion        Current Medications:   Current Outpatient Medications   Medication Sig    acetaminophen (TYLENOL) 500 MG tablet Take  500 mg by mouth every 6 (six) hours as needed.    ALPRAZolam (XANAX) 2 MG Tab Take 1 tablet (2 mg total) by mouth nightly as needed (insomnia).    doxycycline (DORYX) 100 MG EC tablet Take 1 tablet (100 mg total) by mouth every 12 (twelve) hours. (Patient not taking: Reported on 5/20/2022)    EScitalopram oxalate (LEXAPRO) 20 MG tablet TAKE ONE TABLET BY MOUTH IN THE EVENING    famotidine (PEPCID) 20 MG tablet Take 20 mg by mouth.    fluconazole (DIFLUCAN) 200 MG Tab     lactulose (CHRONULAC) 20 gram/30 mL Soln Take 23 mLs (15 g total) by mouth 3 (three) times daily as needed. (Patient not taking: No sig reported)    LORazepam (ATIVAN) 1 MG tablet Take 1 tablet (1 mg total) by mouth 2 (two) times daily. (Patient not taking: No sig reported)    methocarbamoL (ROBAXIN) 500 MG Tab Take 1 tablet (500 mg total) by mouth 4 (four) times daily.    metoprolol succinate (TOPROL-XL) 25 MG 24 hr tablet TAKE ONE TABLET BY MOUTH DAILY    omeprazole (PRILOSEC) 40 MG capsule Take 1 capsule (40 mg total) by mouth every morning.    ondansetron (ZOFRAN) 8 MG tablet Take 1 tablet (8 mg total) by mouth every 12 (twelve) hours as needed for Nausea. (Patient not taking: Reported on 5/20/2022)    oxyCODONE (ROXICODONE) 10 mg Tab immediate release tablet Take 1 tablet (10 mg total) by mouth every 6 (six) hours as needed (pain).    polyethylene glycol (GLYCOLAX) 17 gram PwPk Take 17 g by mouth once daily. (Patient not taking: No sig reported)    predniSONE (DELTASONE) 20 MG tablet Take 1 tablet (20 mg total) by mouth daily as needed (as needed).    pregabalin (LYRICA) 75 MG capsule Take 1 capsule (75 mg total) by mouth every evening. (Patient not taking: No sig reported)    senna-docusate 8.6-50 mg (PERICOLACE) 8.6-50 mg per tablet Take 1 tablet by mouth once daily. (Patient not taking: No sig reported)    venetoclax (VENCLEXTA) 100 mg Tab Take 2 tablets (200 mg total) by mouth once daily. (Patient not taking: No sig  reported)     No current facility-administered medications for this visit.     ALLERGIES:   Review of patient's allergies indicates:   Allergen Reactions    Methotrexate analogues      Elevated Liver Enzyme    Bactrim [sulfamethoxazole-trimethoprim] Nausea And Vomiting and Rash       Review of Systems:     Review of Systems   Constitutional: Positive for fatigue (improving). Negative for appetite change, chills, diaphoresis, fever and unexpected weight change.   HENT:   Negative for hearing loss, mouth sores, nosebleeds, sore throat, trouble swallowing and voice change.    Eyes: Negative for eye problems and icterus.   Respiratory: Negative for chest tightness, cough, hemoptysis, shortness of breath and wheezing.    Cardiovascular: Negative for chest pain, leg swelling and palpitations.   Gastrointestinal: Negative for abdominal distention, abdominal pain, blood in stool, diarrhea, nausea and vomiting.   Endocrine: Negative for hot flashes.   Genitourinary: Negative for bladder incontinence, difficulty urinating, dysuria and hematuria.    Musculoskeletal: Positive for arthralgias and myalgias. Negative for back pain, flank pain, gait problem, neck pain and neck stiffness.   Skin: Negative for itching, rash and wound.   Neurological: Negative for dizziness, extremity weakness, gait problem, headaches, numbness, seizures and speech difficulty.   Hematological: Negative for adenopathy. Does not bruise/bleed easily.   Psychiatric/Behavioral: Negative for confusion, depression and sleep disturbance. The patient is not nervous/anxious.       Vitals:    07/13/22 1321   BP: 123/62   Pulse: 97   Resp: 16   Temp: 98.7 °F (37.1 °C)       Physical Exam:   Physical Exam  Vitals reviewed.   Constitutional:       Appearance: She is well-developed.   HENT:      Head: Normocephalic and atraumatic.      Right Ear: External ear normal.      Left Ear: External ear normal.      Mouth/Throat:      Pharynx: No posterior oropharyngeal  erythema.   Eyes:      Conjunctiva/sclera: Conjunctivae normal.   Cardiovascular:      Rate and Rhythm: Normal rate and regular rhythm.      Heart sounds: Normal heart sounds. No murmur heard.     Pulmonary:      Effort: Pulmonary effort is normal. No respiratory distress.      Breath sounds: Normal breath sounds. No stridor. No wheezing or rales.   Abdominal:      General: Bowel sounds are normal. There is no distension.      Palpations: Abdomen is soft.      Tenderness: There is no abdominal tenderness.   Musculoskeletal:         General: Normal range of motion.      Cervical back: Normal range of motion.      Right lower leg: No edema.      Left lower leg: No edema.   Skin:     General: Skin is warm and dry.      Findings: No rash.   Neurological:      Mental Status: She is alert and oriented to person, place, and time.   Psychiatric:         Behavior: Behavior normal.         Thought Content: Thought content normal.         Judgment: Judgment normal.         ECOG Performance Status: (foot note - ECOG PS provided by Eastern Cooperative Oncology Group) 2 - Symptomatic, <50% confined to bed    Karnofsky Performance Score:  80%- Normal Activity with Effort: Some Symptoms of Disease    Labs:   Lab Results   Component Value Date    WBC 7.25 07/13/2022    HGB 12.6 07/13/2022    HCT 40.7 07/13/2022    PLT 79 (L) 07/13/2022    ALT 20 07/13/2022    AST 21 07/13/2022     07/13/2022    K 3.7 07/13/2022     07/13/2022    CREATININE 1.0 07/13/2022    BUN 12 07/13/2022    CO2 24 07/13/2022    TSH 1.913 10/17/2019    INR 0.9 01/31/2021    HGBA1C 6.0 (H) 01/31/2021       Imaging: previous imaging has been reviewed    Assessment and Plan:     Mrs. Mac is a 72 year old female with AML in second remission.     AML  -- Ad[facility] from [facility]18 early AM with WBC around 100s, for suspected new non-M3 AML  --lo res HLA typing on 5/26/18 and hi res on 5/27/18 done in anticipation of possible need for  future transplant   --Pt with two daughters, two full sisters (in their mid 60s, one with brain aneurysm hx, other one without any medical issues), and one full brother (59 y/o healthy).  --NPM1 +, CEBPA (-), FLT3 (+).  On Midostaurin 50 mg PO BID until Day 14 (held starting 6/8 to 6/11). Stopped when FLAG JENNIFER re-induction started. Restarted Midostaurin at 25 mg bid on day 8 of FLAG JENNIFER induction (6/22), increase to full dose 50 mg bid (6/26) as improvement in liver enzymes. Stopped 7/3/18 due to abnormal cardiac echo.  --day 14 bone marrow biopsy completed 6/11 showing persistent disease with 15% CD 34 positive blasts  --Day 60 of FLAG-JENNIFER, tolerating without difficulty except nausea/vomiting  --day 14 restaging bone marrow biopsy done 6/29 without complication, results with no evidence of AML, FLT 3 negative, will need repeat marrow at count recovery outpatient   --recovery marrow showed no evidence of disease  --HiDAC #1 on 8/14/18, HiDAC#2 on 9/11/18  --Cycle 3 delayed 1 week due to pancytopenia. Started on 10/16/18  --recovery bone marrow showed no signs of residual leukemia  --Bone marrow biopsy done last week reveals early relapse. 11% blasts. We discussed the possibility of re-induction, but she is adamantly against. She does not want to be admitted to the hospital.  --Plan for single agent Gilteritinib 120mg has changed based on next gen sequencing that reports FLT-3 negative at this time  --Completed 4 cycles of Decitabine + Venetoclax  --Most recent marrow shows no evidence of disease  --Will continue with supportive care      Pancytopenia  --Transfuse for Hgb <7, Plt <10K or bleeding  --continue ppx acyclovir, fluconazole and levaquin due to neutropenia  --Plan to check labs q 4 weeks     Suspected Leukemia Cutis  --pathology from dermatology confirmed inflammatory changes  --now resolved with topical steroid cream     Abnormal Liver Function Tests  --ALT and AST and Alk phos have again increased  substantially since re-starting midostaurin.   --midostaurin started day 8 at 25 mg bid, monitoring liver enzymes closely and improved. Increasing Midostaurin to 50 mg bid 6/26. Stopped Midostaurin (7/3) due to new diagnosis of systolic heart failure EF 35%.  --6/22/18 liver US showing mild intrahepatic dilation, otherwise impression of hepatic steatosis.  Will hold off of MRCP at this time.    --permanently discontinued midostaurin   --LFTs wnl    Chemotherapy induced cardiomyopathy  --cardiology follow-up as outpatient in 3 months  --repeat 2D echo on 6/15 with preserved EF of 60%.  However 7/2/18 echo with reduced EF 30-35%  --Echo on 8/15/18 was within normal range EF of 60-65%    Rheumatologic issues  --Previously on steroids and methotrexate  --working with [facility] rheumatology    Dislocation of Jaw  --MRI complete 11/22/19 showing degenerative changes  --seen by ENT  --No issues now    Lack of appetite  --will refer patient to hem/onc nutrition    Constipation  --increasing prunes in diet  --add OTC stool softener or laxative as needed  --Controlled with current OTC laxatives      30 minutes were spent face to face with the patient and her family to discuss the disease, natural history, treatment options and survival statistics. I have provided the patient with an opportunity to ask questions and have all questions answered to her satisfaction.      she will return to the clinic in 4 weeks, but knows to call in the interim if symptoms change or should a problem arise.        Laquita Troy MD  Hematology and Medical Oncology  Bone Marrow Transplant  [facility]             BMT Chart Routing      Follow up with physician 1 month. Labs early afternoon and see me 1 hour later   Follow up with [facility]    Infusion scheduling note    Injection scheduling note    Labs CBC and CMP   Lab interval:     Imaging None      Pharmacy appointment No pharmacy appointment needed      Other referrals No additional referrals  needed

## 2022-08-30 NOTE — SUBJECTIVE & OBJECTIVE
Subjective:     Interval History:     - Day 3 of induction chemotherapy today.  - tolerating therapy well thus far.    - remains afebrile and blood pressure elevated.  - headaches on/off but tylenol helps.   - using nasal spray to help with nasal dryness.   - remains anxious and had palpitations yesterday afternoon, but PRN xanax helps.   - (+) 254 cc.       Objective:     Vital Signs (Most Recent):  Temp: 98 °F (36.7 °C) (05/31/18 0824)  Pulse: 60 (05/31/18 0824)  Resp: 16 (05/31/18 0824)  BP: (!) 156/70 (05/31/18 0824)  SpO2: 98 % (05/31/18 0824) Vital Signs (24h Range):  Temp:  [97 °F (36.1 °C)-98 °F (36.7 °C)] 98 °F (36.7 °C)  Pulse:  [59-71] 60  Resp:  [14-19] 16  SpO2:  [95 %-98 %] 98 %  BP: (125-156)/(56-78) 156/70     Weight: 75.4 kg (166 lb 3.6 oz)  Body mass index is 29.45 kg/m².  Body surface area is 1.83 meters squared.    ECOG SCORE         90% KPS    Intake/Output - Last 3 Shifts       05/29 0700 - 05/30 0659 05/30 0700 - 05/31 0659 05/31 0700 - 06/01 0659    P.O. 1000 360     I.V. (mL/kg) 733.3 (9.7) 779.3 (10.3)     IV Piggyback 408.6 2765     Total Intake(mL/kg) 2142 (28.3) 3904.4 (51.8)     Urine (mL/kg/hr) 2350 (1.3) 5050 (2.8)     Total Output 2350 5050      Net -208 -1145.7             Stool Occurrence 0 x 1 x           Physical Exam   Constitutional: She is oriented to person, place, and time. Vital signs are normal. She is cooperative. No distress.   HENT:   Head: Normocephalic.   Erythema noted on back of her throat.    Eyes: Conjunctivae and EOM are normal.   Neck: Trachea normal and normal range of motion. Neck supple.   Cardiovascular: Normal rate, regular rhythm, S1 normal, S2 normal and intact distal pulses.    Pulmonary/Chest: Effort normal and breath sounds normal.   Abdominal: Soft. Normal appearance and bowel sounds are normal.   Musculoskeletal: Normal range of motion.   Neurological: She is alert and oriented to person, place, and time. Coordination and gait normal.   Skin: Skin  Procedure   - Large Joint Arthrocentesis: L knee on 8/30/2022 10:32 AM  Indications: pain  Details: 22 G needle  Medications: 30 mg Hyaluronan 30 MG/2ML; 3 mL lidocaine PF 1% 1 %  Outcome: tolerated well, no immediate complications  Procedure, treatment alternatives, risks and benefits explained, specific risks discussed. Consent was given by the patient. Immediately prior to procedure a time out was called to verify the correct patient, procedure, equipment, support staff and site/side marked as required.             is dry and intact. She is not diaphoretic. No pallor.   RUE PICC line with drsg C/D/I  Various small maculopapular lesions/rash to BLE (shins, R more than L). Per pt, this is improving   Psychiatric: She has a normal mood and affect. Her speech is normal.     Significant Labs:   CBC:     Recent Labs  Lab 05/30/18  0342 05/31/18  0345   WBC 0.49* 0.36*   HGB 7.1* 6.7*   HCT 21.2* 20.1*   PLT 11* 12*   , CMP:     Recent Labs  Lab 05/30/18  0342 05/31/18  0345    139   K 4.2 4.2    110   CO2 22* 22*   * 107   BUN 15 20   CREATININE 0.7 0.7   CALCIUM 8.1* 8.1*   PROT 6.3 6.1   ALBUMIN 2.7* 2.7*   BILITOT 0.8 1.1*   ALKPHOS 132 119   AST 11 12   ALT 8* 9*   ANIONGAP 7* 7*   EGFRNONAA >60.0 >60.0    and Uric Acid     Recent Labs  Lab 05/30/18  0342 05/31/18  0345   URICACID 2.9 3.0       Diagnostic Results:  I have reviewed all pertinent imaging results/findings within the past 24 hours.   ECHO 5/25/18, ordered, EF 65%

## 2022-08-31 ENCOUNTER — OFFICE VISIT (OUTPATIENT)
Dept: HEMATOLOGY/ONCOLOGY | Facility: CLINIC | Age: 72
End: 2022-08-31
Payer: MEDICARE

## 2022-08-31 ENCOUNTER — LAB VISIT (OUTPATIENT)
Dept: LAB | Facility: HOSPITAL | Age: 72
End: 2022-08-31
Attending: INTERNAL MEDICINE
Payer: MEDICARE

## 2022-08-31 VITALS
TEMPERATURE: 100 F | WEIGHT: 204.06 LBS | SYSTOLIC BLOOD PRESSURE: 127 MMHG | OXYGEN SATURATION: 97 % | RESPIRATION RATE: 16 BRPM | DIASTOLIC BLOOD PRESSURE: 59 MMHG | HEART RATE: 80 BPM | BODY MASS INDEX: 34.84 KG/M2 | HEIGHT: 64 IN

## 2022-08-31 DIAGNOSIS — C93.01 ACUTE MONOCYTIC LEUKEMIA IN REMISSION: Primary | ICD-10-CM

## 2022-08-31 DIAGNOSIS — C92.01 ACUTE MYELOID LEUKEMIA IN REMISSION: ICD-10-CM

## 2022-08-31 DIAGNOSIS — Z86.79 HISTORY OF CARDIOMYOPATHY: ICD-10-CM

## 2022-08-31 DIAGNOSIS — E87.8 ELECTROLYTE ABNORMALITY: ICD-10-CM

## 2022-08-31 DIAGNOSIS — R79.89 ELEVATED LFTS: ICD-10-CM

## 2022-08-31 DIAGNOSIS — N17.9 ACUTE KIDNEY INJURY: ICD-10-CM

## 2022-08-31 LAB
ALBUMIN SERPL BCP-MCNC: 3.5 G/DL (ref 3.5–5.2)
ALP SERPL-CCNC: 156 U/L (ref 55–135)
ALT SERPL W/O P-5'-P-CCNC: 141 U/L (ref 10–44)
ANION GAP SERPL CALC-SCNC: 12 MMOL/L (ref 8–16)
ANISOCYTOSIS BLD QL SMEAR: SLIGHT
AST SERPL-CCNC: 67 U/L (ref 10–40)
BASOPHILS # BLD AUTO: 0.01 K/UL (ref 0–0.2)
BASOPHILS NFR BLD: 0.1 % (ref 0–1.9)
BILIRUB SERPL-MCNC: 0.4 MG/DL (ref 0.1–1)
BUN SERPL-MCNC: 16 MG/DL (ref 8–23)
CALCIUM SERPL-MCNC: 9.6 MG/DL (ref 8.7–10.5)
CHLORIDE SERPL-SCNC: 103 MMOL/L (ref 95–110)
CO2 SERPL-SCNC: 24 MMOL/L (ref 23–29)
CREAT SERPL-MCNC: 1 MG/DL (ref 0.5–1.4)
DIFFERENTIAL METHOD: ABNORMAL
EOSINOPHIL # BLD AUTO: 0 K/UL (ref 0–0.5)
EOSINOPHIL NFR BLD: 0 % (ref 0–8)
ERYTHROCYTE [DISTWIDTH] IN BLOOD BY AUTOMATED COUNT: 13.3 % (ref 11.5–14.5)
EST. GFR  (NO RACE VARIABLE): >60 ML/MIN/1.73 M^2
GLUCOSE SERPL-MCNC: 101 MG/DL (ref 70–110)
HCT VFR BLD AUTO: 38.3 % (ref 37–48.5)
HGB BLD-MCNC: 12.4 G/DL (ref 12–16)
HYPOCHROMIA BLD QL SMEAR: ABNORMAL
IMM GRANULOCYTES # BLD AUTO: 0.03 K/UL (ref 0–0.04)
IMM GRANULOCYTES NFR BLD AUTO: 0.4 % (ref 0–0.5)
LYMPHOCYTES # BLD AUTO: 0.7 K/UL (ref 1–4.8)
LYMPHOCYTES NFR BLD: 9.9 % (ref 18–48)
MCH RBC QN AUTO: 32.9 PG (ref 27–31)
MCHC RBC AUTO-ENTMCNC: 32.4 G/DL (ref 32–36)
MCV RBC AUTO: 102 FL (ref 82–98)
MONOCYTES # BLD AUTO: 0.5 K/UL (ref 0.3–1)
MONOCYTES NFR BLD: 6.6 % (ref 4–15)
NEUTROPHILS # BLD AUTO: 6.2 K/UL (ref 1.8–7.7)
NEUTROPHILS NFR BLD: 83 % (ref 38–73)
NRBC BLD-RTO: 0 /100 WBC
OVALOCYTES BLD QL SMEAR: ABNORMAL
PLATELET # BLD AUTO: 74 K/UL (ref 150–450)
PMV BLD AUTO: 10.6 FL (ref 9.2–12.9)
POIKILOCYTOSIS BLD QL SMEAR: SLIGHT
POLYCHROMASIA BLD QL SMEAR: ABNORMAL
POTASSIUM SERPL-SCNC: 4 MMOL/L (ref 3.5–5.1)
PROT SERPL-MCNC: 6.6 G/DL (ref 6–8.4)
RBC # BLD AUTO: 3.77 M/UL (ref 4–5.4)
SODIUM SERPL-SCNC: 139 MMOL/L (ref 136–145)
SPHEROCYTES BLD QL SMEAR: ABNORMAL
WBC # BLD AUTO: 7.47 K/UL (ref 3.9–12.7)

## 2022-08-31 PROCEDURE — 80053 COMPREHEN METABOLIC PANEL: CPT | Performed by: INTERNAL MEDICINE

## 2022-08-31 PROCEDURE — 99215 OFFICE O/P EST HI 40 MIN: CPT | Mod: S$PBB,,, | Performed by: INTERNAL MEDICINE

## 2022-08-31 PROCEDURE — 85025 COMPLETE CBC W/AUTO DIFF WBC: CPT | Performed by: INTERNAL MEDICINE

## 2022-08-31 PROCEDURE — 99999 PR PBB SHADOW E&M-EST. PATIENT-LVL III: CPT | Mod: PBBFAC,,, | Performed by: INTERNAL MEDICINE

## 2022-08-31 PROCEDURE — 99213 OFFICE O/P EST LOW 20 MIN: CPT | Mod: PBBFAC | Performed by: INTERNAL MEDICINE

## 2022-08-31 PROCEDURE — 99999 PR PBB SHADOW E&M-EST. PATIENT-LVL III: ICD-10-PCS | Mod: PBBFAC,,, | Performed by: INTERNAL MEDICINE

## 2022-08-31 PROCEDURE — 36415 COLL VENOUS BLD VENIPUNCTURE: CPT | Performed by: INTERNAL MEDICINE

## 2022-08-31 PROCEDURE — 99215 PR OFFICE/OUTPT VISIT, EST, LEVL V, 40-54 MIN: ICD-10-PCS | Mod: S$PBB,,, | Performed by: INTERNAL MEDICINE

## 2022-09-07 NOTE — PROGRESS NOTES
Hematology and Medical Oncology   Follow Up Note     08/31/2022    Primary Oncologic Diagnosis: AML, FLT 3 + and NPMN1+    History of Present Ilness:   Sabrina Badillo) is a pleasant 72 y.o.female presents to clinic following 2 induction therapies for AML. She was in the hospital roughly 50 days to receive 7+3 and then FLAG JENNIFER.    Oncology History:   68 y.o. female admitted overnight for possible new AML. Came in from OSH with elevated WBC of 100.   05/25/2018: Pt is now on hydrea 2 grams BID, allopurinol 300 mg daily, and IVF. Pt may need rasburicase this weekend. She will undergo a BM bx and aspiration today. She is an induction candidate and will not be screened for research trials. She has anxiety for which she usually takes xanax, this was re-started.   05/26/2018 PICC placed. Reports she slept well last night. Anxiety improved with prn xanax. EF 65%, HIV and Hep panel negative. 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  6/12/2018: Day 15 of 7+3 induction, restaging BM bx done yesterday. On Midostaurin. Fever yesterday and BC with gram + cocci in clusters. On cefepime day 2 and will start Vanc today. Labial mucositis improving.   6/13/2018: Day 16 7+3. BC with staph aureus, identification pending. Surveillance blood cultures done today. Afebrile x 48 hours. On cefepime and Vanc. Labial mucositis resolved. No complaints of pain. Nausea controlled. No diarrhea. Primary complaint is fatigue.   6/14/20018: Day 17 of 7+3. Day 14 bone marrow results pending. BC with staph epidermis only sensitive to Vancomycin. Vanc day 3 and cefepime day 4. Vanc trough 12.2 last night. BP remains low but stable. Afebrile x 72 hours.   6/15/2018: Day 18 of 7+3. Day 14 bone marrow biopsy shows persistent disease with 15% blasts. Discussion with patient regarding treatment and she is deciding if she wants to proceed with re-induction vs outpatient maintenance. Temp of 100.4 overnight, unsustained. Day 5 Cefepime and Day 4 of  Vanc for staph epidermis. Repeat cultures NGTD. BP improved. Electrolytes (mag, phos, K and calcium) replaced aggressively this am and will repeat labs this afternoon. No complaints this am.   06/16/2018: Day 19 of 7+3. Day 2 of Flag-JENNIFER. Remains afebrile. Calcium remains low and have increased PO supplementation. Remains on vanc and aztreonam. Somewhat loopy feeling after receiving benadryl.   06/17/2018: Day 20 of 7+3. Day 3 of FLAG-JENNIFER. Multiple electrolyte abnormalities. New bilateral leg and feet swelling.   6/18/2018: Day 21 of 7+3 and Day 4 of FLAG JENNIFER. Tolerating well with some nausea controlled with Zofran. VSS, afebrile. ANC 1900 on Neupogen. Continues Vanc for staph epi bacteremia. Multiple electrolytes replaced today. Complains of edema to lower extremities, not improved after 20 of lasix yesterday. No sob or CP.   6/19/18: Day 22 of 7+3 and Day 5 of FLAG JENNIFER. VSS, afebrile, ANC 3900. Vanc trough elevated and dose adjusted this am. Lower extremity edema improved after 40 of lasix yesterday, net negative 300 cc I&O. Mild nausea, no abdominal pain or diarrhea. Poor appetite but no difficulty eating or taking pills.   6/20/18: Day 23 of 7+3 and Day 6 of FLAG JENNIFER. Patient complains of nausea and vomiting overnight and this am, especially when taking pills. Will add Zyprexa daily in addition to Zofran, compazine, and ativan PRN and will change meds to IV. 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 Afebrile.  No diarrhea, mouth sores or pain.  06/21/2018: Day 24 of 7+3 and Day 7 of FLAG JENNIFER. Nausea better controlled. Continued on Vanc.  6/22/2018: Day 25 of 7+3 and Day 8 of FLAG JENNIFER. Nausea improved some with Zyprexa but did have 1 episode of emesis overnight.continuing meds IV due to vomiting. Remains afebrile. ANC 0. Continues Vanc and Flagyl. Electrolytes replaced.   06/23/2018 : day 26 of 7+3 and Day 9 of FLAG JENNIFER.  Nausea persists,  worsened after taking pills so meds converted to IV.  Encouraged ambulation.  Continue with flagyl for leptotrichia goodfellowii.  RUQ US showed intrahepatic dilation, overall impression hepatic steatosis.  CBD 0.5cm.  No Gallbladder.    06/24/2018 : day 27 of 7+3 and Day 10 of FLAG JENNIFER.  Nausea persisting, able to tolerate some po pills.  Last day of flagyl (day 5).  Still pancytopenic. Appetite still low as po intake triggers nausea.  06/25/2018: day 28 of 7+3 and Day 11 of FLAG JENNIFER. Afebrile, ANC 0. Has completed Flagyl. Will decrease Vanc to 1000 mg q12, trough 19.9, will complete Vanc on 6/27. Liver enzymes stable on Midostaurin. VSS.   6/26/2018: day 29 of 7+3 and day 12 of FLAG JENNIFER. Liver enzymes improved and Midostaurin increased to full dose 50 mg bid. Nausea controlled, afebrile, VSS, NEON.  6/27/2018: day 30 of 7+3 and Day 13 of FLAG JENNIFER. Persistent nausea and emesis x 1 yesterday. Compazine changed to scheduled. Vanc ends today. Afebrile, VSS. Aggressive electrolyte replacement, low electrolytes possibly due to Midostaurin.   6/28/2018: day 31 of 7+3 and Day 14 of FLAG JENNIFER. Nausea improved with scheduled Compazine. Patient has agreed to start converting some IV meds to po. VSS, afebrile, electrolytes wnl today. Only complains of fatigue, new rash noted to upper back and chest and legs, papular rash mildly red.  6/29/2018: Day 32 of 7+3 and Day 15 of FLAG JENNIFER. Day 14 restaging bone marrow biopsy done at bedside today. Patient states nausea improved but she did have emesis last night after taking 9 pm pills. Rash improved. No mouth sores, diarrhea or pain.   7/2/2018: Day 35 of 7+3 and Day 18 of FLAG JENNIFER. Restaging BM bx results pending. Fluid overload over the weekend. Chest x-ray with pulmonary edema. Os sats down to 88%. Placed on O2 at 2 L NC, off O2 this am. Received lasix. Net negative 2.7 L today. 1 episode of nausea, no emesis. Reports anxiety relieved with PRN meds. Electrolytes improved,  afebrile, VSS.   7/3/2018: Day 36 of 7+3 and Day 19 of FLAG JENNIFER. Restaging Bm bx with GABRIEL. Cardiology consulted for abnormal echo, EF 30% with pulmonary HTN and severe L atrial enlargement. EKG with T wave abnormality, possible anterolateral ischemia and prolonged QTc of 530. Medications adjusted. Nausea controlled, no diarrhea. Electrolytes improved. On O2 as needed.   07/04/2018 : Day 37 of 7+3 and Day 20 of FLAG JENNIFER Diarrhea in AM, watery, no associated abdominal pain, nausea, or vomiting.    07/05/2018: Day 38 of 7+3 ane Day 21 of FLAG JENNIFER. No CP or SOB, cardiology following. On RA. All meds changed to po, denies nausea or vomiting and no diarrhea. VSS, afebrile.   7/6/2018: Day 22 of FLAG JENNIFER. Continues to tolerate po meds with no nausea. Afebrile. Awaiting count recovery for discharge.  7/7/2018-/8/2018: Day 40/41 of 7+3, Day 23/24 of FLAG JENNIFER.  Improving clinically.  Started on mag oxide per patient request.  Labs showing signs of ANC recovery.  7/9/2018: Day 25 FLAG JENNIFER, , continues Neupogen. Received platelets today. Afebrile, VSS. Tolerating all oral meds with no nausea or vomiting. Only complains of fatigue.   7/10/2018: Day 26 FLAG JENNIFER,  today. Had 2 episodes of vomiting and diarrhea last night. Feeling better. Afebrile, VSS.   7/11/2018: Day 27 FLAG JENNIFER,  today. No vomiting or diarrhea overnight and no nausea. Afebrile, VSS. Complains of back pain (bone pain) suspect due to count recovery. Relieved with oxy IR and Zyrtec started.  07/12/2018: Day 28 flag jennifer, engrafted today with ANC of 730. Back pain improved with zyrtec. Scheduled for IT chemo tomorrow at 1:30 pm and discharge home thereafter. Will likely need plt transfusion prior to IT chemo tomorrow   7/13/2018: Day 29 of FLAG JENNIFER. ANC up to 1300 today. Transfusing platelets today prior to LP for IT chemo. Patient continues to complain of bone pain, mostly to back and legs. No nausea, diarrhea, sob. Afebrile and VSS.    --hospitalized 7/23-7/25 for C.diff diarrhea, neutropenic fever.  While inpatient she developed pink blisters on her palm that were concerning for relapse.  --palm biopsy on 7/26/18 for suspicion of leukemia cutis was also negative for sign of relapse  --bone marrow biopsy 7/31/18 was negative for signs of relapse   --Admitted on 8/14/18 for HiDAC#1  --Admitted on 9/11/18 for HiDAC#2  --Admitted on 10/16/18 for HiDAC#3  --Post consolidation bone marrow biopsy on 12/27/2018: AML FISH (BM) Interpretation: The result is within normal limits for the AML FISH panel. No evidence of residual disease.  --Bone Marrow Biopsy on 5/9/19 NO MORPHOLOGIC EVIDENCE OF RESIDUAL ACUTE MYELOID LEUKEMIA. RECOMMEND NGS STUDY TO RULE OUT RESIDUAL DISEASE. deletion 20q  --Bone Marrow Biopsy on 9/23/2020: CELLULARITY=40-50%, TRILINEAGE HEMATOPOIETIC ACTIVITY (M/E= 0.6:1). ERYTHROID HYPERPLASIA, DYSERYTHROPOIESIS, AND INCREASED BLAST (11%). SEE COMMENT.  FOCAL GRADE 1 RETICULAR FIBROSIS.  --Decitabine + Venetoclax: C1D1: 10/12/2020, C2D1: 11/9  --Bone marrow biopsy on 2/9/2021: VARIABLY CELLULAR MARROW WITH DYSERYTHROPOIESIS, DYSMEGAKARYOPOIESIS AND  MARKED MYELOID HYPOPLASIA.NO MORPHOLOGIC EVIDENCE OF RESIDUAL ACUTE MYELOID LEUKEMIA.    Interval History:   Mrs. Mac is a 72 y.o. patient who presents today alone for ongoing lab and symptom check for her AML.    Overall doing well.     Continues to use a walker for long distances. Over all feels very good. Energy has improved.    Planning 50th wedding anniversary trip.    Past Medical History:   Past Medical History:   Diagnosis Date    Cancer 03/2018    AML    CHF (congestive heart failure)     Diarrhea 9/14/2018    Encounter for blood transfusion        Current Medications:   Current Outpatient Medications   Medication Sig    acetaminophen (TYLENOL) 500 MG tablet Take 500 mg by mouth every 6 (six) hours as needed.    ALPRAZolam (XANAX) 2 MG Tab Take 1 tablet (2 mg total) by mouth  nightly as needed (insomnia).    EScitalopram oxalate (LEXAPRO) 20 MG tablet TAKE ONE TABLET BY MOUTH IN THE EVENING    famotidine (PEPCID) 20 MG tablet Take 20 mg by mouth.    fluconazole (DIFLUCAN) 200 MG Tab     methocarbamoL (ROBAXIN) 500 MG Tab Take 1 tablet (500 mg total) by mouth 4 (four) times daily.    metoprolol succinate (TOPROL-XL) 25 MG 24 hr tablet TAKE ONE TABLET BY MOUTH DAILY    omeprazole (PRILOSEC) 40 MG capsule Take 1 capsule (40 mg total) by mouth every morning.    oxyCODONE (ROXICODONE) 10 mg Tab immediate release tablet Take 1 tablet (10 mg total) by mouth every 6 (six) hours as needed (pain).    polyethylene glycol (GLYCOLAX) 17 gram PwPk Take 17 g by mouth once daily.    predniSONE (DELTASONE) 20 MG tablet Take 1 tablet (20 mg total) by mouth daily as needed (as needed).    doxycycline (DORYX) 100 MG EC tablet Take 1 tablet (100 mg total) by mouth every 12 (twelve) hours. (Patient not taking: No sig reported)    lactulose (CHRONULAC) 20 gram/30 mL Soln Take 23 mLs (15 g total) by mouth 3 (three) times daily as needed. (Patient not taking: No sig reported)    LORazepam (ATIVAN) 1 MG tablet Take 1 tablet (1 mg total) by mouth 2 (two) times daily. (Patient not taking: No sig reported)    ondansetron (ZOFRAN) 8 MG tablet Take 1 tablet (8 mg total) by mouth every 12 (twelve) hours as needed for Nausea. (Patient not taking: No sig reported)    pregabalin (LYRICA) 75 MG capsule Take 1 capsule (75 mg total) by mouth every evening. (Patient not taking: No sig reported)    senna-docusate 8.6-50 mg (PERICOLACE) 8.6-50 mg per tablet Take 1 tablet by mouth once daily. (Patient not taking: No sig reported)    venetoclax (VENCLEXTA) 100 mg Tab Take 2 tablets (200 mg total) by mouth once daily. (Patient not taking: No sig reported)     No current facility-administered medications for this visit.     ALLERGIES:   Review of patient's allergies indicates:   Allergen Reactions    Methotrexate  analogues      Elevated Liver Enzyme    Bactrim [sulfamethoxazole-trimethoprim] Nausea And Vomiting and Rash       Review of Systems:     Review of Systems   Constitutional: Positive for fatigue (improving). Negative for appetite change, chills, diaphoresis, fever and unexpected weight change.   HENT:   Negative for hearing loss, mouth sores, nosebleeds, sore throat, trouble swallowing and voice change.    Eyes: Negative for eye problems and icterus.   Respiratory: Negative for chest tightness, cough, hemoptysis, shortness of breath and wheezing.    Cardiovascular: Negative for chest pain, leg swelling and palpitations.   Gastrointestinal: Negative for abdominal distention, abdominal pain, blood in stool, diarrhea, nausea and vomiting.   Endocrine: Negative for hot flashes.   Genitourinary: Negative for bladder incontinence, difficulty urinating, dysuria and hematuria.    Musculoskeletal: Positive for arthralgias and myalgias. Negative for back pain, flank pain, gait problem, neck pain and neck stiffness.   Skin: Negative for itching, rash and wound.   Neurological: Negative for dizziness, extremity weakness, gait problem, headaches, numbness, seizures and speech difficulty.   Hematological: Negative for adenopathy. Does not bruise/bleed easily.   Psychiatric/Behavioral: Negative for confusion, depression and sleep disturbance. The patient is not nervous/anxious.       Vitals:    08/31/22 1322   BP: (!) 127/59   Pulse: 80   Resp: 16   Temp: 99.5 °F (37.5 °C)       Physical Exam:   Physical Exam  Vitals reviewed.   Constitutional:       Appearance: She is well-developed.   HENT:      Head: Normocephalic and atraumatic.      Right Ear: External ear normal.      Left Ear: External ear normal.      Mouth/Throat:      Pharynx: No posterior oropharyngeal erythema.   Eyes:      Conjunctiva/sclera: Conjunctivae normal.   Cardiovascular:      Rate and Rhythm: Normal rate and regular rhythm.      Heart sounds: Normal heart  sounds. No murmur heard.     Pulmonary:      Effort: Pulmonary effort is normal. No respiratory distress.      Breath sounds: Normal breath sounds. No stridor. No wheezing or rales.   Abdominal:      General: Bowel sounds are normal. There is no distension.      Palpations: Abdomen is soft.      Tenderness: There is no abdominal tenderness.   Musculoskeletal:         General: Normal range of motion.      Cervical back: Normal range of motion.      Right lower leg: No edema.      Left lower leg: No edema.   Skin:     General: Skin is warm and dry.      Findings: No rash.   Neurological:      Mental Status: She is alert and oriented to person, place, and time.   Psychiatric:         Behavior: Behavior normal.         Thought Content: Thought content normal.         Judgment: Judgment normal.         ECOG Performance Status: (foot note - ECOG PS provided by Eastern Cooperative Oncology Group) 2 - Symptomatic, <50% confined to bed    Karnofsky Performance Score:  80%- Normal Activity with Effort: Some Symptoms of Disease    Labs:   Lab Results   Component Value Date    WBC 7.47 08/31/2022    HGB 12.4 08/31/2022    HCT 38.3 08/31/2022    PLT 74 (L) 08/31/2022     (H) 08/31/2022    AST 67 (H) 08/31/2022     08/31/2022    K 4.0 08/31/2022     08/31/2022    CREATININE 1.0 08/31/2022    BUN 16 08/31/2022    CO2 24 08/31/2022    TSH 1.913 10/17/2019    INR 0.9 01/31/2021    HGBA1C 6.0 (H) 01/31/2021       Imaging: previous imaging has been reviewed    Assessment and Plan:     Mrs. Mac is a 72 year old female with AML in second remission.     AML  -- Admitted from [facility] on [facility]18 early AM with WBC around 100s, for suspected new non-M3 AML  --lo res HLA typing on 5/26/18 and hi res on 5/27/18 done in anticipation of possible need for future transplant   --Pt with two daughters, two full sisters (in their mid 60s, one with brain aneurysm hx, other one without any medical issues), and one full  brother (59 y/o healthy).  --NPM1 +, CEBPA (-), FLT3 (+).  On Midostaurin 50 mg PO BID until Day 14 (held starting 6/8 to 6/11). Stopped when FLAG JENNIFER re-induction started. Restarted Midostaurin at 25 mg bid on day 8 of FLAG JENNIFER induction (6/22), increase to full dose 50 mg bid (6/26) as improvement in liver enzymes. Stopped 7/3/18 due to abnormal cardiac echo.  --day 14 bone marrow biopsy completed 6/11 showing persistent disease with 15% CD 34 positive blasts  --Day 60 of FLAG-JENNIFER, tolerating without difficulty except nausea/vomiting  --day 14 restaging bone marrow biopsy done 6/29 without complication, results with no evidence of AML, FLT 3 negative, will need repeat marrow at count recovery outpatient   --recovery marrow showed no evidence of disease  --HiDAC #1 on 8/14/18, HiDAC#2 on 9/11/18  --Cycle 3 delayed 1 week due to pancytopenia. Started on 10/16/18  --recovery bone marrow showed no signs of residual leukemia  --Bone marrow biopsy done last week reveals early relapse. 11% blasts. We discussed the possibility of re-induction, but she is adamantly against. She does not want to be admitted to the hospital.  --Plan for single agent Gilteritinib 120mg has changed based on next gen sequencing that reports FLT-3 negative at this time  --Completed 4 cycles of Decitabine + Venetoclax  --Most recent marrow shows no evidence of disease  --Will continue with supportive care    Abnormal Liver Function Tests  --ALT and AST and Alk phos have again increased substantially since re-starting midostaurin.   --midostaurin started day 8 at 25 mg bid, monitoring liver enzymes closely and improved. Increasing Midostaurin to 50 mg bid 6/26. Stopped Midostaurin (7/3) due to new diagnosis of systolic heart failure EF 35%.  --6/22/18 liver US showing mild intrahepatic dilation, otherwise impression of hepatic steatosis.  Will hold off of MRCP at this time.    --permanently discontinued midostaurin   --LFTs wnl    Chemotherapy  induced cardiomyopathy  --cardiology follow-up as outpatient in 3 months  --repeat 2D echo on 6/15 with preserved EF of 60%.  However 7/2/18 echo with reduced EF 30-35%  --Echo on 8/15/18 was within normal range EF of 60-65%    Rheumatologic issues  --Previously on steroids and methotrexate  --working with [facility]      30 minutes were spent face to face with the patient and her family to discuss the disease, natural history, treatment options and survival statistics. I have provided the patient with an opportunity to ask questions and have all questions answered to her satisfaction.      she will return to the clinic in 4 weeks, but knows to call in the interim if symptoms change or should a problem arise.        Laquita rToy MD  Hematology and Medical Oncology  Bone Marrow Transplant  [facility]             BMT Chart Routing      Follow up with physician 1 month. labs early afternoon and see me 1 hour later [okay to over book me if need be]   Follow up with [facility]    Infusion scheduling note    Injection scheduling note    Labs CBC and CMP   Lab interval:     Imaging None      Pharmacy appointment No pharmacy appointment needed      Other referrals No additional referrals needed

## 2022-10-04 NOTE — ASSESSMENT & PLAN NOTE
- post induction, now resolved  - last echo with improved EF      Detail Level: Zone Detail Level: Generalized

## 2022-10-07 ENCOUNTER — LAB VISIT (OUTPATIENT)
Dept: LAB | Facility: HOSPITAL | Age: 72
End: 2022-10-07
Payer: MEDICARE

## 2022-10-07 ENCOUNTER — OFFICE VISIT (OUTPATIENT)
Dept: HEMATOLOGY/ONCOLOGY | Facility: CLINIC | Age: 72
End: 2022-10-07
Payer: MEDICARE

## 2022-10-07 VITALS
TEMPERATURE: 98 F | HEART RATE: 82 BPM | HEIGHT: 64 IN | DIASTOLIC BLOOD PRESSURE: 66 MMHG | SYSTOLIC BLOOD PRESSURE: 134 MMHG | OXYGEN SATURATION: 97 % | BODY MASS INDEX: 35.19 KG/M2 | RESPIRATION RATE: 20 BRPM | WEIGHT: 206.13 LBS

## 2022-10-07 DIAGNOSIS — M35.1 MCTD (MIXED CONNECTIVE TISSUE DISEASE): ICD-10-CM

## 2022-10-07 DIAGNOSIS — Z78.9 IMPAIRED MOBILITY AND ADLS: ICD-10-CM

## 2022-10-07 DIAGNOSIS — Z74.09 IMPAIRED MOBILITY AND ADLS: ICD-10-CM

## 2022-10-07 DIAGNOSIS — C93.01 ACUTE MONOCYTIC LEUKEMIA IN REMISSION: ICD-10-CM

## 2022-10-07 DIAGNOSIS — R53.83 LETHARGY: ICD-10-CM

## 2022-10-07 DIAGNOSIS — I42.7 CHEMOTHERAPY INDUCED CARDIOMYOPATHY: Primary | ICD-10-CM

## 2022-10-07 DIAGNOSIS — T45.1X5A CHEMOTHERAPY INDUCED CARDIOMYOPATHY: Primary | ICD-10-CM

## 2022-10-07 DIAGNOSIS — C92.01 ACUTE MYELOID LEUKEMIA IN REMISSION: ICD-10-CM

## 2022-10-07 DIAGNOSIS — C92.02 ACUTE MYELOID LEUKEMIA IN RELAPSE: ICD-10-CM

## 2022-10-07 LAB
ALBUMIN SERPL BCP-MCNC: 3.7 G/DL (ref 3.5–5.2)
ALP SERPL-CCNC: 82 U/L (ref 55–135)
ALT SERPL W/O P-5'-P-CCNC: 18 U/L (ref 10–44)
ANION GAP SERPL CALC-SCNC: 12 MMOL/L (ref 8–16)
AST SERPL-CCNC: 18 U/L (ref 10–40)
BASOPHILS # BLD AUTO: 0.02 K/UL (ref 0–0.2)
BASOPHILS NFR BLD: 0.3 % (ref 0–1.9)
BILIRUB SERPL-MCNC: 0.3 MG/DL (ref 0.1–1)
BUN SERPL-MCNC: 13 MG/DL (ref 8–23)
CALCIUM SERPL-MCNC: 9.8 MG/DL (ref 8.7–10.5)
CHLORIDE SERPL-SCNC: 104 MMOL/L (ref 95–110)
CO2 SERPL-SCNC: 26 MMOL/L (ref 23–29)
CREAT SERPL-MCNC: 0.9 MG/DL (ref 0.5–1.4)
DIFFERENTIAL METHOD: ABNORMAL
EOSINOPHIL # BLD AUTO: 0 K/UL (ref 0–0.5)
EOSINOPHIL NFR BLD: 0 % (ref 0–8)
ERYTHROCYTE [DISTWIDTH] IN BLOOD BY AUTOMATED COUNT: 13.5 % (ref 11.5–14.5)
EST. GFR  (NO RACE VARIABLE): >60 ML/MIN/1.73 M^2
GLUCOSE SERPL-MCNC: 87 MG/DL (ref 70–110)
HCT VFR BLD AUTO: 40.8 % (ref 37–48.5)
HGB BLD-MCNC: 12.7 G/DL (ref 12–16)
IMM GRANULOCYTES # BLD AUTO: 0.05 K/UL (ref 0–0.04)
IMM GRANULOCYTES NFR BLD AUTO: 0.7 % (ref 0–0.5)
LYMPHOCYTES # BLD AUTO: 1.9 K/UL (ref 1–4.8)
LYMPHOCYTES NFR BLD: 28.4 % (ref 18–48)
MCH RBC QN AUTO: 32.7 PG (ref 27–31)
MCHC RBC AUTO-ENTMCNC: 31.1 G/DL (ref 32–36)
MCV RBC AUTO: 105 FL (ref 82–98)
MONOCYTES # BLD AUTO: 0.5 K/UL (ref 0.3–1)
MONOCYTES NFR BLD: 6.9 % (ref 4–15)
NEUTROPHILS # BLD AUTO: 4.3 K/UL (ref 1.8–7.7)
NEUTROPHILS NFR BLD: 63.7 % (ref 38–73)
NRBC BLD-RTO: 0 /100 WBC
PLATELET # BLD AUTO: 70 K/UL (ref 150–450)
PLATELET BLD QL SMEAR: ABNORMAL
PMV BLD AUTO: 11.4 FL (ref 9.2–12.9)
POTASSIUM SERPL-SCNC: 3.4 MMOL/L (ref 3.5–5.1)
PROT SERPL-MCNC: 6.6 G/DL (ref 6–8.4)
RBC # BLD AUTO: 3.88 M/UL (ref 4–5.4)
SODIUM SERPL-SCNC: 142 MMOL/L (ref 136–145)
WBC # BLD AUTO: 6.7 K/UL (ref 3.9–12.7)

## 2022-10-07 PROCEDURE — 99215 OFFICE O/P EST HI 40 MIN: CPT | Mod: S$PBB,,, | Performed by: INTERNAL MEDICINE

## 2022-10-07 PROCEDURE — 85025 COMPLETE CBC W/AUTO DIFF WBC: CPT | Performed by: INTERNAL MEDICINE

## 2022-10-07 PROCEDURE — 99999 PR PBB SHADOW E&M-EST. PATIENT-LVL IV: ICD-10-PCS | Mod: PBBFAC,,, | Performed by: INTERNAL MEDICINE

## 2022-10-07 PROCEDURE — 99999 PR PBB SHADOW E&M-EST. PATIENT-LVL IV: CPT | Mod: PBBFAC,,, | Performed by: INTERNAL MEDICINE

## 2022-10-07 PROCEDURE — 99214 OFFICE O/P EST MOD 30 MIN: CPT | Mod: PBBFAC | Performed by: INTERNAL MEDICINE

## 2022-10-07 PROCEDURE — 99215 PR OFFICE/OUTPT VISIT, EST, LEVL V, 40-54 MIN: ICD-10-PCS | Mod: S$PBB,,, | Performed by: INTERNAL MEDICINE

## 2022-10-07 PROCEDURE — 80053 COMPREHEN METABOLIC PANEL: CPT | Performed by: INTERNAL MEDICINE

## 2022-10-07 PROCEDURE — 36415 COLL VENOUS BLD VENIPUNCTURE: CPT | Performed by: INTERNAL MEDICINE

## 2022-10-07 RX ORDER — ALPRAZOLAM 2 MG/1
2 TABLET ORAL NIGHTLY PRN
Qty: 60 TABLET | Refills: 0 | Status: SHIPPED | OUTPATIENT
Start: 2022-10-07 | End: 2023-01-19

## 2022-10-07 NOTE — PROGRESS NOTES
Hematology and Medical Oncology   Follow Up Note     10/07/2022    Primary Oncologic Diagnosis: AML, FLT 3 + and NPMN1+    History of Present Ilness:   Sabrina Badillo) is a pleasant 72 y.o.female presents to clinic following 2 induction therapies for AML. She was in the hospital roughly 50 days to receive 7+3 and then FLAG JENNIFER. Now on active observation.    Oncology History:   68 y.o. female admitted overnight for possible new AML. Came in from OSH with elevated WBC of 100.   05/25/2018: Pt is now on hydrea 2 grams BID, allopurinol 300 mg daily, and IVF. Pt may need rasburicase this weekend. She will undergo a BM bx and aspiration today. She is an induction candidate and will not be screened for research trials. She has anxiety for which she usually takes xanax, this was re-started.   05/26/2018 PICC placed. Reports she slept well last night. Anxiety improved with prn xanax. EF 65%, HIV and Hep panel negative. 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  6/12/2018: Day 15 of 7+3 induction, restaging BM bx done yesterday. On Midostaurin. Fever yesterday and BC with gram + cocci in clusters. On cefepime day 2 and will start Vanc today. Labial mucositis improving.   6/13/2018: Day 16 7+3. BC with staph aureus, identification pending. Surveillance blood cultures done today. Afebrile x 48 hours. On cefepime and Vanc. Labial mucositis resolved. No complaints of pain. Nausea controlled. No diarrhea. Primary complaint is fatigue.   6/14/20018: Day 17 of 7+3. Day 14 bone marrow results pending. BC with staph epidermis only sensitive to Vancomycin. Vanc day 3 and cefepime day 4. Vanc trough 12.2 last night. BP remains low but stable. Afebrile x 72 hours.   6/15/2018: Day 18 of 7+3. Day 14 bone marrow biopsy shows persistent disease with 15% blasts. Discussion with patient regarding treatment and she is deciding if she wants to proceed with re-induction vs outpatient maintenance. Temp of 100.4 overnight, unsustained.  Day 5 Cefepime and Day 4 of Vanc for staph epidermis. Repeat cultures NGTD. BP improved. Electrolytes (mag, phos, K and calcium) replaced aggressively this am and will repeat labs this afternoon. No complaints this am.   06/16/2018: Day 19 of 7+3. Day 2 of Flag-JENNIFER. Remains afebrile. Calcium remains low and have increased PO supplementation. Remains on vanc and aztreonam. Somewhat loopy feeling after receiving benadryl.   06/17/2018: Day 20 of 7+3. Day 3 of FLAG-JENNIFER. Multiple electrolyte abnormalities. New bilateral leg and feet swelling.   6/18/2018: Day 21 of 7+3 and Day 4 of FLAG JENNIFER. Tolerating well with some nausea controlled with Zofran. VSS, afebrile. ANC 1900 on Neupogen. Continues Vanc for staph epi bacteremia. Multiple electrolytes replaced today. Complains of edema to lower extremities, not improved after 20 of lasix yesterday. No sob or CP.   6/19/18: Day 22 of 7+3 and Day 5 of FLAG JENNIFER. VSS, afebrile, ANC 3900. Vanc trough elevated and dose adjusted this am. Lower extremity edema improved after 40 of lasix yesterday, net negative 300 cc I&O. Mild nausea, no abdominal pain or diarrhea. Poor appetite but no difficulty eating or taking pills.   6/20/18: Day 23 of 7+3 and Day 6 of FLAG JENNIFER. Patient complains of nausea and vomiting overnight and this am, especially when taking pills. Will add Zyprexa daily in addition to Zofran, compazine, and ativan PRN and will change meds to IV. 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 Afebrile.  No diarrhea, mouth sores or pain.  06/21/2018: Day 24 of 7+3 and Day 7 of FLAG JENNIFER. Nausea better controlled. Continued on Vanc.  6/22/2018: Day 25 of 7+3 and Day 8 of FLAG JENNIFER. Nausea improved some with Zyprexa but did have 1 episode of emesis overnight.continuing meds IV due to vomiting. Remains afebrile. ANC 0. Continues Vanc and Flagyl. Electrolytes replaced.   06/23/2018 : day 26 of 7+3 and Day 9 of FLAG  JENNIFER.  Nausea persists, worsened after taking pills so meds converted to IV.  Encouraged ambulation.  Continue with flagyl for leptotrichia goodfellowii.  RUQ US showed intrahepatic dilation, overall impression hepatic steatosis.  CBD 0.5cm.  No Gallbladder.    06/24/2018 : day 27 of 7+3 and Day 10 of FLAG JENNIFER.  Nausea persisting, able to tolerate some po pills.  Last day of flagyl (day 5).  Still pancytopenic. Appetite still low as po intake triggers nausea.  06/25/2018: day 28 of 7+3 and Day 11 of FLAG JENNIFER. Afebrile, ANC 0. Has completed Flagyl. Will decrease Vanc to 1000 mg q12, trough 19.9, will complete Vanc on 6/27. Liver enzymes stable on Midostaurin. VSS.   6/26/2018: day 29 of 7+3 and day 12 of FLAG JENNIFER. Liver enzymes improved and Midostaurin increased to full dose 50 mg bid. Nausea controlled, afebrile, VSS, NEON.  6/27/2018: day 30 of 7+3 and Day 13 of FLAG JENNIFER. Persistent nausea and emesis x 1 yesterday. Compazine changed to scheduled. Vanc ends today. Afebrile, VSS. Aggressive electrolyte replacement, low electrolytes possibly due to Midostaurin.   6/28/2018: day 31 of 7+3 and Day 14 of FLAG JENNIFER. Nausea improved with scheduled Compazine. Patient has agreed to start converting some IV meds to po. VSS, afebrile, electrolytes wnl today. Only complains of fatigue, new rash noted to upper back and chest and legs, papular rash mildly red.  6/29/2018: Day 32 of 7+3 and Day 15 of FLAG JENNIFER. Day 14 restaging bone marrow biopsy done at bedside today. Patient states nausea improved but she did have emesis last night after taking 9 pm pills. Rash improved. No mouth sores, diarrhea or pain.   7/2/2018: Day 35 of 7+3 and Day 18 of FLAG JENNIFER. Restaging BM bx results pending. Fluid overload over the weekend. Chest x-ray with pulmonary edema. Os sats down to 88%. Placed on O2 at 2 L NC, off O2 this am. Received lasix. Net negative 2.7 L today. 1 episode of nausea, no emesis. Reports anxiety relieved with PRN meds.  Electrolytes improved, afebrile, VSS.   7/3/2018: Day 36 of 7+3 and Day 19 of FLAG JENNIFER. Restaging Bm bx with GABRIEL. Cardiology consulted for abnormal echo, EF 30% with pulmonary HTN and severe L atrial enlargement. EKG with T wave abnormality, possible anterolateral ischemia and prolonged QTc of 530. Medications adjusted. Nausea controlled, no diarrhea. Electrolytes improved. On O2 as needed.   07/04/2018 : Day 37 of 7+3 and Day 20 of FLAG JENNIFER Diarrhea in AM, watery, no associated abdominal pain, nausea, or vomiting.    07/05/2018: Day 38 of 7+3 ane Day 21 of FLAG JENNIFER. No CP or SOB, cardiology following. On RA. All meds changed to po, denies nausea or vomiting and no diarrhea. VSS, afebrile.   7/6/2018: Day 22 of FLAG JENNIFER. Continues to tolerate po meds with no nausea. Afebrile. Awaiting count recovery for discharge.  7/7/2018-/8/2018: Day 40/41 of 7+3, Day 23/24 of FLAG JENNIFER.  Improving clinically.  Started on mag oxide per patient request.  Labs showing signs of ANC recovery.  7/9/2018: Day 25 FLAG JENNIFER, , continues Neupogen. Received platelets today. Afebrile, VSS. Tolerating all oral meds with no nausea or vomiting. Only complains of fatigue.   7/10/2018: Day 26 FLAG JENNIFER,  today. Had 2 episodes of vomiting and diarrhea last night. Feeling better. Afebrile, VSS.   7/11/2018: Day 27 FLAG JENNIFER,  today. No vomiting or diarrhea overnight and no nausea. Afebrile, VSS. Complains of back pain (bone pain) suspect due to count recovery. Relieved with oxy IR and Zyrtec started.  07/12/2018: Day 28 flag jennifer, engrafted today with ANC of 730. Back pain improved with zyrtec. Scheduled for IT chemo tomorrow at 1:30 pm and discharge home thereafter. Will likely need plt transfusion prior to IT chemo tomorrow   7/13/2018: Day 29 of FLAG JENNIFER. ANC up to 1300 today. Transfusing platelets today prior to LP for IT chemo. Patient continues to complain of bone pain, mostly to back and legs. No nausea, diarrhea, sob.  Afebrile and VSS.   --hospitalized 7/23-7/25 for C.diff diarrhea, neutropenic fever.  While inpatient she developed pink blisters on her palm that were concerning for relapse.  --palm biopsy on 7/26/18 for suspicion of leukemia cutis was also negative for sign of relapse  --bone marrow biopsy 7/31/18 was negative for signs of relapse   --Admitted on 8/14/18 for HiDAC#1  --Admitted on 9/11/18 for HiDAC#2  --Admitted on 10/16/18 for HiDAC#3  --Post consolidation bone marrow biopsy on 12/27/2018: AML FISH (BM) Interpretation: The result is within normal limits for the AML FISH panel. No evidence of residual disease.  --Bone Marrow Biopsy on 5/9/19 NO MORPHOLOGIC EVIDENCE OF RESIDUAL ACUTE MYELOID LEUKEMIA. RECOMMEND NGS STUDY TO RULE OUT RESIDUAL DISEASE. deletion 20q  --Bone Marrow Biopsy on 9/23/2020: CELLULARITY=40-50%, TRILINEAGE HEMATOPOIETIC ACTIVITY (M/E= 0.6:1). ERYTHROID HYPERPLASIA, DYSERYTHROPOIESIS, AND INCREASED BLAST (11%). SEE COMMENT.  FOCAL GRADE 1 RETICULAR FIBROSIS.  --Decitabine + Venetoclax: C1D1: 10/12/2020, C2D1: 11/9  --Bone marrow biopsy on 2/9/2021: VARIABLY CELLULAR MARROW WITH DYSERYTHROPOIESIS, DYSMEGAKARYOPOIESIS AND  MARKED MYELOID HYPOPLASIA.NO MORPHOLOGIC EVIDENCE OF RESIDUAL ACUTE MYELOID LEUKEMIA.    Interval History:   Mrs. Mac is a 72 y.o. patient who presents with her  for ongoing lab and symptom check for her AML.    Overall doing well. Now she uses a motorized scooter.    Continues to use a walker for long distances. Over all feels very good. Energy has improved. Still taking steroids.    Planning 50th wedding anniversary trip. Gone mid Oct till halloween.    Past Medical History:   Past Medical History:   Diagnosis Date    Cancer 03/2018    AML    CHF (congestive heart failure)     Diarrhea 9/14/2018    Encounter for blood transfusion        Current Medications:   Current Outpatient Medications   Medication Sig    acetaminophen (TYLENOL) 500 MG tablet Take 500 mg by  mouth every 6 (six) hours as needed.    ALPRAZolam (XANAX) 2 MG Tab Take 1 tablet (2 mg total) by mouth nightly as needed (insomnia).    doxycycline (DORYX) 100 MG EC tablet Take 1 tablet (100 mg total) by mouth every 12 (twelve) hours. (Patient not taking: No sig reported)    EScitalopram oxalate (LEXAPRO) 20 MG tablet TAKE ONE TABLET BY MOUTH IN THE EVENING    famotidine (PEPCID) 20 MG tablet Take 20 mg by mouth.    fluconazole (DIFLUCAN) 200 MG Tab     lactulose (CHRONULAC) 20 gram/30 mL Soln Take 23 mLs (15 g total) by mouth 3 (three) times daily as needed. (Patient not taking: No sig reported)    LORazepam (ATIVAN) 1 MG tablet Take 1 tablet (1 mg total) by mouth 2 (two) times daily. (Patient not taking: No sig reported)    methocarbamoL (ROBAXIN) 500 MG Tab Take 1 tablet (500 mg total) by mouth 4 (four) times daily.    metoprolol succinate (TOPROL-XL) 25 MG 24 hr tablet TAKE ONE TABLET BY MOUTH DAILY    omeprazole (PRILOSEC) 40 MG capsule Take 1 capsule (40 mg total) by mouth every morning.    ondansetron (ZOFRAN) 8 MG tablet Take 1 tablet (8 mg total) by mouth every 12 (twelve) hours as needed for Nausea. (Patient not taking: No sig reported)    oxyCODONE (ROXICODONE) 10 mg Tab immediate release tablet Take 1 tablet (10 mg total) by mouth every 6 (six) hours as needed (pain).    polyethylene glycol (GLYCOLAX) 17 gram PwPk Take 17 g by mouth once daily.    predniSONE (DELTASONE) 20 MG tablet Take 1 tablet (20 mg total) by mouth daily as needed (as needed).    pregabalin (LYRICA) 75 MG capsule Take 1 capsule (75 mg total) by mouth every evening. (Patient not taking: No sig reported)    senna-docusate 8.6-50 mg (PERICOLACE) 8.6-50 mg per tablet Take 1 tablet by mouth once daily. (Patient not taking: No sig reported)    venetoclax (VENCLEXTA) 100 mg Tab Take 2 tablets (200 mg total) by mouth once daily. (Patient not taking: No sig reported)     No current facility-administered medications for this visit.      ALLERGIES:   Review of patient's allergies indicates:   Allergen Reactions    Methotrexate analogues      Elevated Liver Enzyme    Bactrim [sulfamethoxazole-trimethoprim] Nausea And Vomiting and Rash       Review of Systems:     Review of Systems   Constitutional: Positive for fatigue (improving). Negative for appetite change, chills, diaphoresis, fever and unexpected weight change.   HENT:   Negative for hearing loss, mouth sores, nosebleeds, sore throat, trouble swallowing and voice change.    Eyes: Negative for eye problems and icterus.   Respiratory: Negative for chest tightness, cough, hemoptysis, shortness of breath and wheezing.    Cardiovascular: Negative for chest pain, leg swelling and palpitations.   Gastrointestinal: Negative for abdominal distention, abdominal pain, blood in stool, diarrhea, nausea and vomiting.   Endocrine: Negative for hot flashes.   Genitourinary: Negative for bladder incontinence, difficulty urinating, dysuria and hematuria.    Musculoskeletal: Positive for arthralgias and myalgias. Negative for back pain, flank pain, gait problem, neck pain and neck stiffness.   Skin: Negative for itching, rash and wound.   Neurological: Negative for dizziness, extremity weakness, gait problem, headaches, numbness, seizures and speech difficulty.   Hematological: Negative for adenopathy. Does not bruise/bleed easily.   Psychiatric/Behavioral: Negative for confusion, depression and sleep disturbance. The patient is not nervous/anxious.        Physical Exam:     Vitals:    10/07/22 1316   BP: 134/66   Pulse: 82   Resp: 20   Temp: 97.9 °F (36.6 °C)         Constitutional:       Appearance: She is well-developed.   HENT:      Head: Normocephalic and atraumatic.      Right Ear: External ear normal.      Left Ear: External ear normal.      Mouth/Throat:      Pharynx: No posterior oropharyngeal erythema.   Eyes:      Conjunctiva/sclera: Conjunctivae normal.   Cardiovascular:      Rate and Rhythm:  Normal rate and regular rhythm.      Heart sounds: Normal heart sounds. No murmur heard.     Pulmonary:      Effort: Pulmonary effort is normal. No respiratory distress.      Breath sounds: Normal breath sounds. No stridor. No wheezing or rales.   Abdominal:      General: Bowel sounds are normal. There is no distension.      Palpations: Abdomen is soft.      Tenderness: There is no abdominal tenderness.   Musculoskeletal:         General: Normal range of motion.      Cervical back: Normal range of motion.      Right lower leg: No edema.      Left lower leg: No edema.   Skin:     General: Skin is warm and dry.      Findings: No rash.   Neurological:      Mental Status: She is alert and oriented to person, place, and time.   Psychiatric:         Behavior: Behavior normal.         Thought Content: Thought content normal.         Judgment: Judgment normal.         ECOG Performance Status: (foot note - ECOG PS provided by Eastern Cooperative Oncology Group) 2 - Symptomatic, <50% confined to bed    Karnofsky Performance Score:  80%- Normal Activity with Effort: Some Symptoms of Disease    Labs:   Lab Results   Component Value Date    WBC 6.70 10/07/2022    HGB 12.7 10/07/2022    HCT 40.8 10/07/2022    PLT 70 (L) 10/07/2022    ALT 18 10/07/2022    AST 18 10/07/2022     10/07/2022    K 3.4 (L) 10/07/2022     10/07/2022    CREATININE 0.9 10/07/2022    BUN 13 10/07/2022    CO2 26 10/07/2022    TSH 1.913 10/17/2019    INR 0.9 01/31/2021    HGBA1C 6.0 (H) 01/31/2021       Imaging: previous imaging has been reviewed    Assessment and Plan:     Mrs. Mac is a 72 year old female with AML in second remission.     AML  -- Admitted from [facility] on 5/25/18 early AM with WBC around 100s, for suspected new non-M3 AML  --lo res HLA typing on 5/26/18 and hi res on 5/27/18 done in anticipation of possible need for future transplant   --Pt with two daughters, two full sisters (in their mid 60s, one with brain aneurysm  hx, other one without any medical issues), and one full brother (59 y/o healthy).  --NPM1 +, CEBPA (-), FLT3 (+).  On Midostaurin 50 mg PO BID until Day 14 (held starting 6/8 to 6/11). Stopped when FLAG JENNIFER re-induction started. Restarted Midostaurin at 25 mg bid on day 8 of FLAG JENNIFER induction (6/22), increase to full dose 50 mg bid (6/26) as improvement in liver enzymes. Stopped 7/3/18 due to abnormal cardiac echo.  --day 14 bone marrow biopsy completed 6/11 showing persistent disease with 15% CD 34 positive blasts  --Day 60 of FLAG-JENNIFER, tolerating without difficulty except nausea/vomiting  --day 14 restaging bone marrow biopsy done 6/29 without complication, results with no evidence of AML, FLT 3 negative, will need repeat marrow at count recovery outpatient   --recovery marrow showed no evidence of disease  --HiDAC #1 on 8/14/18, HiDAC#2 on 9/11/18  --Cycle 3 delayed 1 week due to pancytopenia. Started on 10/16/18  --recovery bone marrow showed no signs of residual leukemia  --Bone marrow biopsy done last week reveals early relapse. 11% blasts. We discussed the possibility of re-induction, but she is adamantly against. She does not want to be admitted to the hospital.  --Plan for single agent Gilteritinib 120mg has changed based on next gen sequencing that reports FLT-3 negative at this time  --Completed 4 cycles of Decitabine + Venetoclax  --Most recent marrow shows no evidence of disease  --Will continue with supportive care  --Continue to follow monthly labs    Abnormal Liver Function Tests  --ALT and AST and Alk phos have again increased substantially since re-starting midostaurin.   --midostaurin started day 8 at 25 mg bid, monitoring liver enzymes closely and improved. Increasing Midostaurin to 50 mg bid 6/26. Stopped Midostaurin (7/3) due to new diagnosis of systolic heart failure EF 35%.  --6/22/18 liver US showing mild intrahepatic dilation, otherwise impression of hepatic steatosis.  Will hold off of  MRCP at this time.    --permanently discontinued midostaurin   --LFTs wnl    Chemotherapy induced cardiomyopathy  --cardiology follow-up as outpatient in 3 months  --repeat 2D echo on 6/15 with preserved EF of 60%.  However 7/2/18 echo with reduced EF 30-35%  --Echo on 8/15/18 was within normal range EF of 60-65%    Rheumatologic issues  --Previously on steroids and methotrexate  --Now on single agent low dose steroids  --[place] [facility]      30 minutes were spent face to face with the patient and her family to discuss the disease, natural history, treatment options and survival statistics. I have provided the patient with an opportunity to ask questions and have all questions answered to her satisfaction.      she will return to the clinic in 4 weeks, but knows to call in the interim if symptoms change or should a problem arise.        Laquita Troy MD  Hematology and Medical Oncology  Bone Marrow Transplant  [facility] [[email or web address]]             BMT Chart Routing      Follow up with physician 1 month. 1. See me with labs week of 11/7 and 12/5. 2. she likes labs early afternoon and see me 1 hour later [okay to over book me if need be]   Follow up with [facility]    Infusion scheduling note    Injection scheduling note    Labs CBC and CMP   Lab interval:     Imaging None      Pharmacy appointment No pharmacy appointment needed      Other referrals No additional referrals needed

## 2022-10-11 DIAGNOSIS — C92.02 ACUTE MYELOID LEUKEMIA IN RELAPSE: Primary | ICD-10-CM

## 2022-10-28 NOTE — ASSESSMENT & PLAN NOTE
10/31/22                            Kaycee Street  [address] [place] [place] [address]    To Whom It May Concern:    This is to certify Kaycee Street was evaluated by Uyen Alegria DO  on 10/25/22 and can return to school on 10/31/22.     RESTRICTIONS: please excuse Kaycee on the dates that follow 10/25, 10/26, 10/27 and 10/28.            Electronically signed by:  Uyen Alegria DO  [address] [place] [address]  Dept Phone: [phone]      --Corrected calcium 8.3 today.  --patient refusing scheduled po calcium replacement due to nausea

## 2022-11-07 ENCOUNTER — LAB VISIT (OUTPATIENT)
Dept: LAB | Facility: HOSPITAL | Age: 72
End: 2022-11-07
Payer: MEDICARE

## 2022-11-07 ENCOUNTER — OFFICE VISIT (OUTPATIENT)
Dept: HEMATOLOGY/ONCOLOGY | Facility: CLINIC | Age: 72
End: 2022-11-07
Payer: MEDICARE

## 2022-11-07 VITALS
WEIGHT: 210.19 LBS | RESPIRATION RATE: 17 BRPM | SYSTOLIC BLOOD PRESSURE: 133 MMHG | OXYGEN SATURATION: 93 % | HEART RATE: 89 BPM | TEMPERATURE: 99 F | HEIGHT: 64 IN | DIASTOLIC BLOOD PRESSURE: 64 MMHG | BODY MASS INDEX: 35.88 KG/M2

## 2022-11-07 DIAGNOSIS — T45.1X5A CHEMOTHERAPY INDUCED CARDIOMYOPATHY: ICD-10-CM

## 2022-11-07 DIAGNOSIS — C92.02 ACUTE MYELOID LEUKEMIA IN RELAPSE: ICD-10-CM

## 2022-11-07 DIAGNOSIS — N17.9 ACUTE KIDNEY INJURY: ICD-10-CM

## 2022-11-07 DIAGNOSIS — I42.7 CHEMOTHERAPY INDUCED CARDIOMYOPATHY: ICD-10-CM

## 2022-11-07 DIAGNOSIS — M35.1 MCTD (MIXED CONNECTIVE TISSUE DISEASE): ICD-10-CM

## 2022-11-07 DIAGNOSIS — C93.01 ACUTE MONOCYTIC LEUKEMIA IN REMISSION: Primary | ICD-10-CM

## 2022-11-07 LAB
ALBUMIN SERPL BCP-MCNC: 3.6 G/DL (ref 3.5–5.2)
ALP SERPL-CCNC: 82 U/L (ref 55–135)
ALT SERPL W/O P-5'-P-CCNC: 29 U/L (ref 10–44)
ANION GAP SERPL CALC-SCNC: 12 MMOL/L (ref 8–16)
AST SERPL-CCNC: 26 U/L (ref 10–40)
BASOPHILS # BLD AUTO: 0.01 K/UL (ref 0–0.2)
BASOPHILS NFR BLD: 0.2 % (ref 0–1.9)
BILIRUB SERPL-MCNC: 0.5 MG/DL (ref 0.1–1)
BUN SERPL-MCNC: 17 MG/DL (ref 8–23)
CALCIUM SERPL-MCNC: 9.7 MG/DL (ref 8.7–10.5)
CHLORIDE SERPL-SCNC: 104 MMOL/L (ref 95–110)
CO2 SERPL-SCNC: 25 MMOL/L (ref 23–29)
CREAT SERPL-MCNC: 1 MG/DL (ref 0.5–1.4)
DIFFERENTIAL METHOD: ABNORMAL
EOSINOPHIL # BLD AUTO: 0 K/UL (ref 0–0.5)
EOSINOPHIL NFR BLD: 0 % (ref 0–8)
ERYTHROCYTE [DISTWIDTH] IN BLOOD BY AUTOMATED COUNT: 13.5 % (ref 11.5–14.5)
EST. GFR  (NO RACE VARIABLE): >60 ML/MIN/1.73 M^2
GLUCOSE SERPL-MCNC: 101 MG/DL (ref 70–110)
HCT VFR BLD AUTO: 39.7 % (ref 37–48.5)
HGB BLD-MCNC: 12.5 G/DL (ref 12–16)
IMM GRANULOCYTES # BLD AUTO: 0.03 K/UL (ref 0–0.04)
IMM GRANULOCYTES NFR BLD AUTO: 0.5 % (ref 0–0.5)
LYMPHOCYTES # BLD AUTO: 1.2 K/UL (ref 1–4.8)
LYMPHOCYTES NFR BLD: 19 % (ref 18–48)
MCH RBC QN AUTO: 32.6 PG (ref 27–31)
MCHC RBC AUTO-ENTMCNC: 31.5 G/DL (ref 32–36)
MCV RBC AUTO: 103 FL (ref 82–98)
MONOCYTES # BLD AUTO: 0.3 K/UL (ref 0.3–1)
MONOCYTES NFR BLD: 4.9 % (ref 4–15)
NEUTROPHILS # BLD AUTO: 4.8 K/UL (ref 1.8–7.7)
NEUTROPHILS NFR BLD: 75.4 % (ref 38–73)
NRBC BLD-RTO: 0 /100 WBC
PLATELET # BLD AUTO: 57 K/UL (ref 150–450)
PLATELET BLD QL SMEAR: ABNORMAL
PMV BLD AUTO: 11.4 FL (ref 9.2–12.9)
POTASSIUM SERPL-SCNC: 4.1 MMOL/L (ref 3.5–5.1)
PROT SERPL-MCNC: 6.3 G/DL (ref 6–8.4)
RBC # BLD AUTO: 3.84 M/UL (ref 4–5.4)
SODIUM SERPL-SCNC: 141 MMOL/L (ref 136–145)
WBC # BLD AUTO: 6.33 K/UL (ref 3.9–12.7)

## 2022-11-07 PROCEDURE — 99215 PR OFFICE/OUTPT VISIT, EST, LEVL V, 40-54 MIN: ICD-10-PCS | Mod: S$PBB,,, | Performed by: INTERNAL MEDICINE

## 2022-11-07 PROCEDURE — 85025 COMPLETE CBC W/AUTO DIFF WBC: CPT | Performed by: INTERNAL MEDICINE

## 2022-11-07 PROCEDURE — 80053 COMPREHEN METABOLIC PANEL: CPT | Performed by: INTERNAL MEDICINE

## 2022-11-07 PROCEDURE — 99999 PR PBB SHADOW E&M-EST. PATIENT-LVL III: ICD-10-PCS | Mod: PBBFAC,,, | Performed by: INTERNAL MEDICINE

## 2022-11-07 PROCEDURE — 99215 OFFICE O/P EST HI 40 MIN: CPT | Mod: S$PBB,,, | Performed by: INTERNAL MEDICINE

## 2022-11-07 PROCEDURE — 99213 OFFICE O/P EST LOW 20 MIN: CPT | Mod: PBBFAC | Performed by: INTERNAL MEDICINE

## 2022-11-07 PROCEDURE — 36415 COLL VENOUS BLD VENIPUNCTURE: CPT | Performed by: INTERNAL MEDICINE

## 2022-11-07 PROCEDURE — 99999 PR PBB SHADOW E&M-EST. PATIENT-LVL III: CPT | Mod: PBBFAC,,, | Performed by: INTERNAL MEDICINE

## 2022-11-09 NOTE — PROGRESS NOTES
Hematology and Medical Oncology   Follow Up Note     11/07/2022    Primary Oncologic Diagnosis: AML, FLT 3 + and NPMN1+    History of Present Ilness:   Sabrina Badillo) is a pleasant 72 y.o.female presents to clinic following 2 induction therapies for AML. She was in the hospital roughly 50 days to receive 7+3 and then FLAG JENNIFER. Now on active observation.    Oncology History:   68 y.o. female admitted overnight for possible new AML. Came in from OSH with elevated WBC of 100.   05/25/2018: Pt is now on hydrea 2 grams BID, allopurinol 300 mg daily, and IVF. Pt may need rasburicase this weekend. She will undergo a BM bx and aspiration today. She is an induction candidate and will not be screened for research trials. She has anxiety for which she usually takes xanax, this was re-started.   05/26/2018 PICC placed. Reports she slept well last night. Anxiety improved with prn xanax. EF 65%, HIV and Hep panel negative. 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  6/12/2018: Day 15 of 7+3 induction, restaging BM bx done yesterday. On Midostaurin. Fever yesterday and BC with gram + cocci in clusters. On cefepime day 2 and will start Vanc today. Labial mucositis improving.   6/13/2018: Day 16 7+3. BC with staph aureus, identification pending. Surveillance blood cultures done today. Afebrile x 48 hours. On cefepime and Vanc. Labial mucositis resolved. No complaints of pain. Nausea controlled. No diarrhea. Primary complaint is fatigue.   6/14/20018: Day 17 of 7+3. Day 14 bone marrow results pending. BC with staph epidermis only sensitive to Vancomycin. Vanc day 3 and cefepime day 4. Vanc trough 12.2 last night. BP remains low but stable. Afebrile x 72 hours.   6/15/2018: Day 18 of 7+3. Day 14 bone marrow biopsy shows persistent disease with 15% blasts. Discussion with patient regarding treatment and she is deciding if she wants to proceed with re-induction vs outpatient maintenance. Temp of 100.4 overnight, unsustained.  Day 5 Cefepime and Day 4 of Vanc for staph epidermis. Repeat cultures NGTD. BP improved. Electrolytes (mag, phos, K and calcium) replaced aggressively this am and will repeat labs this afternoon. No complaints this am.   06/16/2018: Day 19 of 7+3. Day 2 of Flag-JENNIFER. Remains afebrile. Calcium remains low and have increased PO supplementation. Remains on vanc and aztreonam. Somewhat loopy feeling after receiving benadryl.   06/17/2018: Day 20 of 7+3. Day 3 of FLAG-JENNIFER. Multiple electrolyte abnormalities. New bilateral leg and feet swelling.   6/18/2018: Day 21 of 7+3 and Day 4 of FLAG JENNIFER. Tolerating well with some nausea controlled with Zofran. VSS, afebrile. ANC 1900 on Neupogen. Continues Vanc for staph epi bacteremia. Multiple electrolytes replaced today. Complains of edema to lower extremities, not improved after 20 of lasix yesterday. No sob or CP.   6/19/18: Day 22 of 7+3 and Day 5 of FLAG JENNIFER. VSS, afebrile, ANC 3900. Vanc trough elevated and dose adjusted this am. Lower extremity edema improved after 40 of lasix yesterday, net negative 300 cc I&O. Mild nausea, no abdominal pain or diarrhea. Poor appetite but no difficulty eating or taking pills.   6/20/18: Day 23 of 7+3 and Day 6 of FLAG JENNIFER. Patient complains of nausea and vomiting overnight and this am, especially when taking pills. Will add Zyprexa daily in addition to Zofran, compazine, and ativan PRN and will change meds to IV. 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 Afebrile.  No diarrhea, mouth sores or pain.  06/21/2018: Day 24 of 7+3 and Day 7 of FLAG JENNIFER. Nausea better controlled. Continued on Vanc.  6/22/2018: Day 25 of 7+3 and Day 8 of FLAG JENNIFER. Nausea improved some with Zyprexa but did have 1 episode of emesis overnight.continuing meds IV due to vomiting. Remains afebrile. ANC 0. Continues Vanc and Flagyl. Electrolytes replaced.   06/23/2018 : day 26 of 7+3 and Day 9 of FLAG  JENNIFER.  Nausea persists, worsened after taking pills so meds converted to IV.  Encouraged ambulation.  Continue with flagyl for leptotrichia goodfellowii.  RUQ US showed intrahepatic dilation, overall impression hepatic steatosis.  CBD 0.5cm.  No Gallbladder.    06/24/2018 : day 27 of 7+3 and Day 10 of FLAG JENNIFER.  Nausea persisting, able to tolerate some po pills.  Last day of flagyl (day 5).  Still pancytopenic. Appetite still low as po intake triggers nausea.  06/25/2018: day 28 of 7+3 and Day 11 of FLAG JENNIFER. Afebrile, ANC 0. Has completed Flagyl. Will decrease Vanc to 1000 mg q12, trough 19.9, will complete Vanc on 6/27. Liver enzymes stable on Midostaurin. VSS.   6/26/2018: day 29 of 7+3 and day 12 of FLAG JENNIFER. Liver enzymes improved and Midostaurin increased to full dose 50 mg bid. Nausea controlled, afebrile, VSS, NEON.  6/27/2018: day 30 of 7+3 and Day 13 of FLAG JENNIFER. Persistent nausea and emesis x 1 yesterday. Compazine changed to scheduled. Vanc ends today. Afebrile, VSS. Aggressive electrolyte replacement, low electrolytes possibly due to Midostaurin.   6/28/2018: day 31 of 7+3 and Day 14 of FLAG JENNIFER. Nausea improved with scheduled Compazine. Patient has agreed to start converting some IV meds to po. VSS, afebrile, electrolytes wnl today. Only complains of fatigue, new rash noted to upper back and chest and legs, papular rash mildly red.  6/29/2018: Day 32 of 7+3 and Day 15 of FLAG JENNIFER. Day 14 restaging bone marrow biopsy done at bedside today. Patient states nausea improved but she did have emesis last night after taking 9 pm pills. Rash improved. No mouth sores, diarrhea or pain.   7/2/2018: Day 35 of 7+3 and Day 18 of FLAG JENNIFER. Restaging BM bx results pending. Fluid overload over the weekend. Chest x-ray with pulmonary edema. Os sats down to 88%. Placed on O2 at 2 L NC, off O2 this am. Received lasix. Net negative 2.7 L today. 1 episode of nausea, no emesis. Reports anxiety relieved with PRN meds.  Electrolytes improved, afebrile, VSS.   7/3/2018: Day 36 of 7+3 and Day 19 of FLAG JENNIFER. Restaging Bm bx with GABRIEL. Cardiology consulted for abnormal echo, EF 30% with pulmonary HTN and severe L atrial enlargement. EKG with T wave abnormality, possible anterolateral ischemia and prolonged QTc of 530. Medications adjusted. Nausea controlled, no diarrhea. Electrolytes improved. On O2 as needed.   07/04/2018 : Day 37 of 7+3 and Day 20 of FLAG JENNIFER Diarrhea in AM, watery, no associated abdominal pain, nausea, or vomiting.    07/05/2018: Day 38 of 7+3 ane Day 21 of FLAG JENNIFER. No CP or SOB, cardiology following. On RA. All meds changed to po, denies nausea or vomiting and no diarrhea. VSS, afebrile.   7/6/2018: Day 22 of FLAG JENNIFER. Continues to tolerate po meds with no nausea. Afebrile. Awaiting count recovery for discharge.  7/7/2018-/8/2018: Day 40/41 of 7+3, Day 23/24 of FLAG JENNIFER.  Improving clinically.  Started on mag oxide per patient request.  Labs showing signs of ANC recovery.  7/9/2018: Day 25 FLAG JENNIFER, , continues Neupogen. Received platelets today. Afebrile, VSS. Tolerating all oral meds with no nausea or vomiting. Only complains of fatigue.   7/10/2018: Day 26 FLAG JENNIFER,  today. Had 2 episodes of vomiting and diarrhea last night. Feeling better. Afebrile, VSS.   7/11/2018: Day 27 FLAG JENNIFER,  today. No vomiting or diarrhea overnight and no nausea. Afebrile, VSS. Complains of back pain (bone pain) suspect due to count recovery. Relieved with oxy IR and Zyrtec started.  07/12/2018: Day 28 flag jennifer, engrafted today with ANC of 730. Back pain improved with zyrtec. Scheduled for IT chemo tomorrow at 1:30 pm and discharge home thereafter. Will likely need plt transfusion prior to IT chemo tomorrow   7/13/2018: Day 29 of FLAG JENNIFER. ANC up to 1300 today. Transfusing platelets today prior to LP for IT chemo. Patient continues to complain of bone pain, mostly to back and legs. No nausea, diarrhea, sob.  Afebrile and VSS.   --hospitalized 7/23-7/25 for C.diff diarrhea, neutropenic fever.  While inpatient she developed pink blisters on her palm that were concerning for relapse.  --palm biopsy on 7/26/18 for suspicion of leukemia cutis was also negative for sign of relapse  --bone marrow biopsy 7/31/18 was negative for signs of relapse   --Admitted on 8/14/18 for HiDAC#1  --Admitted on 9/11/18 for HiDAC#2  --Admitted on 10/16/18 for HiDAC#3  --Post consolidation bone marrow biopsy on 12/27/2018: AML FISH (BM) Interpretation: The result is within normal limits for the AML FISH panel. No evidence of residual disease.  --Bone Marrow Biopsy on 5/9/19 NO MORPHOLOGIC EVIDENCE OF RESIDUAL ACUTE MYELOID LEUKEMIA. RECOMMEND NGS STUDY TO RULE OUT RESIDUAL DISEASE. deletion 20q  --Bone Marrow Biopsy on 9/23/2020: CELLULARITY=40-50%, TRILINEAGE HEMATOPOIETIC ACTIVITY (M/E= 0.6:1). ERYTHROID HYPERPLASIA, DYSERYTHROPOIESIS, AND INCREASED BLAST (11%). SEE COMMENT.  FOCAL GRADE 1 RETICULAR FIBROSIS.  --Decitabine + Venetoclax: C1D1: 10/12/2020, C2D1: 11/9  --Bone marrow biopsy on 2/9/2021: VARIABLY CELLULAR MARROW WITH DYSERYTHROPOIESIS, DYSMEGAKARYOPOIESIS AND  MARKED MYELOID HYPOPLASIA.NO MORPHOLOGIC EVIDENCE OF RESIDUAL ACUTE MYELOID LEUKEMIA.    Interval History:   Mrs. Mac is a 72 y.o. patient who presents with her  for ongoing lab and symptom check for her AML.Recently took a trip to the [place] area. In a wheel chair today because joints still hurt from all of the walking. There are several bruises on arms and hips, they are healing. Family thinks they are from helping move Noreen in and out of the truck.     Energy is good. No new symptoms. No petechie.    Past Medical History:   Past Medical History:   Diagnosis Date    Cancer 03/2018    AML    CHF (congestive heart failure)     Diarrhea 9/14/2018    Encounter for blood transfusion        Current Medications:   Current Outpatient Medications   Medication Sig     acetaminophen (TYLENOL) 500 MG tablet Take 500 mg by mouth every 6 (six) hours as needed.    ALPRAZolam (XANAX) 2 MG Tab Take 1 tablet (2 mg total) by mouth nightly as needed (insomnia).    EScitalopram oxalate (LEXAPRO) 20 MG tablet TAKE ONE TABLET BY MOUTH IN THE EVENING    famotidine (PEPCID) 20 MG tablet Take 20 mg by mouth.    fluconazole (DIFLUCAN) 200 MG Tab     methocarbamoL (ROBAXIN) 500 MG Tab Take 1 tablet (500 mg total) by mouth 4 (four) times daily.    metoprolol succinate (TOPROL-XL) 25 MG 24 hr tablet TAKE ONE TABLET BY MOUTH DAILY    omeprazole (PRILOSEC) 40 MG capsule Take 1 capsule (40 mg total) by mouth every morning.    oxyCODONE (ROXICODONE) 10 mg Tab immediate release tablet Take 1 tablet (10 mg total) by mouth every 6 (six) hours as needed (pain).    polyethylene glycol (GLYCOLAX) 17 gram PwPk Take 17 g by mouth once daily.    predniSONE (DELTASONE) 20 MG tablet Take 1 tablet (20 mg total) by mouth daily as needed (as needed).    doxycycline (DORYX) 100 MG EC tablet Take 1 tablet (100 mg total) by mouth every 12 (twelve) hours. (Patient not taking: Reported on 5/20/2022)    lactulose (CHRONULAC) 20 gram/30 mL Soln Take 23 mLs (15 g total) by mouth 3 (three) times daily as needed. (Patient not taking: Reported on 5/6/2021)    LORazepam (ATIVAN) 1 MG tablet Take 1 tablet (1 mg total) by mouth 2 (two) times daily. (Patient not taking: No sig reported)    ondansetron (ZOFRAN) 8 MG tablet Take 1 tablet (8 mg total) by mouth every 12 (twelve) hours as needed for Nausea. (Patient not taking: Reported on 5/20/2022)    pregabalin (LYRICA) 75 MG capsule Take 1 capsule (75 mg total) by mouth every evening. (Patient not taking: No sig reported)    senna-docusate 8.6-50 mg (PERICOLACE) 8.6-50 mg per tablet Take 1 tablet by mouth once daily. (Patient not taking: Reported on 5/6/2021)    venetoclax (VENCLEXTA) 100 mg Tab Take 2 tablets (200 mg total) by mouth once daily. (Patient not taking: Reported on  5/6/2021.)     No current facility-administered medications for this visit.     ALLERGIES:   Review of patient's allergies indicates:   Allergen Reactions    Methotrexate analogues      Elevated Liver Enzyme    Bactrim [sulfamethoxazole-trimethoprim] Nausea And Vomiting and Rash       Review of Systems:     Review of Systems   Constitutional: Positive for fatigue (improving). Negative for appetite change, chills, diaphoresis, fever and unexpected weight change.   HENT:   Negative for hearing loss, mouth sores, nosebleeds, sore throat, trouble swallowing and voice change.    Eyes: Negative for eye problems and icterus.   Respiratory: Negative for chest tightness, cough, hemoptysis, shortness of breath and wheezing.    Cardiovascular: Negative for chest pain, leg swelling and palpitations.   Gastrointestinal: Negative for abdominal distention, abdominal pain, blood in stool, diarrhea, nausea and vomiting.   Endocrine: Negative for hot flashes.   Genitourinary: Negative for bladder incontinence, difficulty urinating, dysuria and hematuria.    Musculoskeletal: Positive for arthralgias and myalgias. Negative for back pain, flank pain, gait problem, neck pain and neck stiffness.   Skin: Negative for itching, rash and wound.   Neurological: Negative for dizziness, extremity weakness, gait problem, headaches, numbness, seizures and speech difficulty.   Hematological: Negative for adenopathy. + Bruise. Does not  bleed easily.   Psychiatric/Behavioral: Negative for confusion, depression and sleep disturbance. The patient is not nervous/anxious.        Physical Exam:     Vitals:    11/07/22 1427   BP: 133/64   Pulse: 89   Resp: 17   Temp: 99.3 °F (37.4 °C)         Constitutional:       Appearance: She is well-developed.   HENT:      Head: Normocephalic and atraumatic.      Right Ear: External ear normal.      Left Ear: External ear normal.      Mouth/Throat:      Pharynx: No posterior oropharyngeal erythema.   Eyes:       Conjunctiva/sclera: Conjunctivae normal.   Cardiovascular:      Rate and Rhythm: Normal rate and regular rhythm.      Heart sounds: Normal heart sounds. No murmur heard.     Pulmonary:      Effort: Pulmonary effort is normal. No respiratory distress.      Breath sounds: Normal breath sounds. No stridor. No wheezing or rales.   Abdominal:      General: Bowel sounds are normal. There is no distension.      Palpations: Abdomen is soft.      Tenderness: There is no abdominal tenderness.   Musculoskeletal:         General: Normal range of motion.      Cervical back: Normal range of motion.      Right lower leg: No edema.      Left lower leg: No edema.   Skin:     General: Skin is warm and dry.      Findings: No rash.   Neurological:      Mental Status: She is alert and oriented to person, place, and time.   Psychiatric:         Behavior: Behavior normal.         Thought Content: Thought content normal.         Judgment: Judgment normal.       ECOG Performance Status: (foot note - ECOG PS provided by Eastern Cooperative Oncology Group) 2 - Symptomatic, <50% confined to bed    Karnofsky Performance Score:  80%- Normal Activity with Effort: Some Symptoms of Disease    Labs:   Lab Results   Component Value Date    WBC 6.33 11/07/2022    HGB 12.5 11/07/2022    HCT 39.7 11/07/2022    PLT 57 (L) 11/07/2022    ALT 29 11/07/2022    AST 26 11/07/2022     11/07/2022    K 4.1 11/07/2022     11/07/2022    CREATININE 1.0 11/07/2022    BUN 17 11/07/2022    CO2 25 11/07/2022    TSH 1.913 10/17/2019    INR 0.9 01/31/2021    HGBA1C 6.0 (H) 01/31/2021       Imaging: previous imaging has been reviewed    Assessment and Plan:     Mrs. Mac is a 72 year old female with AML in second remission.     AML  -- Ad[facility] from [facility] on [facility]18 early AM with WBC around 100s, for suspected new non-M3 AML  --lo res HLA typing on 5/26/18 and hi res on 5/27/18 done in anticipation of possible need for future transplant   --Pt with  two daughters, two full sisters (in their mid 60s, one with brain aneurysm hx, other one without any medical issues), and one full brother (59 y/o healthy).  --NPM1 +, CEBPA (-), FLT3 (+).  On Midostaurin 50 mg PO BID until Day 14 (held starting 6/8 to 6/11). Stopped when FLAG JENNIFER re-induction started. Restarted Midostaurin at 25 mg bid on day 8 of FLAG JENNIFER induction (6/22), increase to full dose 50 mg bid (6/26) as improvement in liver enzymes. Stopped 7/3/18 due to abnormal cardiac echo.  --day 14 bone marrow biopsy completed 6/11 showing persistent disease with 15% CD 34 positive blasts  --Day 60 of FLAG-JENNIFER, tolerating without difficulty except nausea/vomiting  --day 14 restaging bone marrow biopsy done 6/29 without complication, results with no evidence of AML, FLT 3 negative, will need repeat marrow at count recovery outpatient   --recovery marrow showed no evidence of disease  --HiDAC #1 on 8/14/18, HiDAC#2 on 9/11/18  --Cycle 3 delayed 1 week due to pancytopenia. Started on 10/16/18  --recovery bone marrow showed no signs of residual leukemia  --Bone marrow biopsy done last week reveals early relapse. 11% blasts. We discussed the possibility of re-induction, but she is adamantly against. She does not want to be admitted to the hospital.  --Plan for single agent Gilteritinib 120mg has changed based on next gen sequencing that reports FLT-3 negative at this time  --Completed 4 cycles of Decitabine + Venetoclax  --Most recent marrow shows no evidence of disease  --Will continue with supportive care  --Continue to follow monthly labs    Abnormal Liver Function Tests  --ALT and AST and Alk phos have again increased substantially since re-starting midostaurin.   --midostaurin started day 8 at 25 mg bid, monitoring liver enzymes closely and improved. Increasing Midostaurin to 50 mg bid 6/26. Stopped Midostaurin (7/3) due to new diagnosis of systolic heart failure EF 35%.  --6/22/18 liver US showing mild  intrahepatic dilation, otherwise impression of hepatic steatosis.  Will hold off of MRCP at this time.    --permanently discontinued midostaurin   --LFTs wnl    Chemotherapy induced cardiomyopathy  --cardiology follow-up as outpatient in 3 months  --repeat 2D echo on 6/15 with preserved EF of 60%.  However 7/2/18 echo with reduced EF 30-35%  --Echo on 8/15/18 was within normal range EF of 60-65%    Rheumatologic issues  --Previously on steroids and methotrexate  --Now on single agent low dose steroids [she self adjusts this medication]  --[place] [facility] [place]      30 minutes were spent face to face with the patient and her family to discuss the disease, natural history, treatment options and survival statistics. I have provided the patient with an opportunity to ask questions and have all questions answered to her satisfaction.      she will return to the clinic in 4 weeks, but knows to call in the interim if symptoms change or should a problem arise.        Laquita Troy MD  Hematology and Medical Oncology  Bone Marrow Transplant  [facility]             BMT Chart Routing      Follow up with physician 4 weeks. 1. see me the afternoon of 12/7 [okay to double book me] with a cbc,cmp   Follow up with SHORTY    Provider visit type    Infusion scheduling note    Injection scheduling note    Labs CBC and CMP   Lab interval:     Imaging    Pharmacy appointment    Other referrals

## 2022-12-05 ENCOUNTER — TELEPHONE (OUTPATIENT)
Dept: HEMATOLOGY/ONCOLOGY | Facility: CLINIC | Age: 72
End: 2022-12-05
Payer: MEDICARE

## 2022-12-16 ENCOUNTER — PATIENT MESSAGE (OUTPATIENT)
Dept: HEMATOLOGY/ONCOLOGY | Facility: CLINIC | Age: 72
End: 2022-12-16
Payer: MEDICARE

## 2022-12-16 DIAGNOSIS — G89.3 CANCER RELATED PAIN: ICD-10-CM

## 2022-12-16 DIAGNOSIS — K21.9 LPRD (LARYNGOPHARYNGEAL REFLUX DISEASE): ICD-10-CM

## 2022-12-16 RX ORDER — OXYCODONE HYDROCHLORIDE 10 MG/1
10 TABLET ORAL EVERY 6 HOURS PRN
Qty: 60 TABLET | Refills: 0 | Status: SHIPPED | OUTPATIENT
Start: 2022-12-16 | End: 2023-09-11 | Stop reason: SDUPTHER

## 2022-12-16 RX ORDER — OMEPRAZOLE 40 MG/1
40 CAPSULE, DELAYED RELEASE ORAL EVERY MORNING
Qty: 30 CAPSULE | Refills: 2 | Status: SHIPPED | OUTPATIENT
Start: 2022-12-16 | End: 2023-03-14 | Stop reason: SDUPTHER

## 2022-12-27 NOTE — ASSESSMENT & PLAN NOTE
- baseline normal  - bilirubin 1.9 today (1.6<1.6), due to transfusion vs medication (Midostaurin)  - AST 9 and ALT11 today  - Midostaurin restarted 6/11 as liver enzymes normalizing   Rinvoq Pregnancy And Lactation Text: Based on animal studies, Rinvoq may cause embryo-fetal harm when administered to pregnant women.  The medication should not be used in pregnancy.  Breastfeeding is not recommended during treatment and for 6 days after the last dose.

## 2023-01-11 ENCOUNTER — OFFICE VISIT (OUTPATIENT)
Dept: HEMATOLOGY/ONCOLOGY | Facility: CLINIC | Age: 73
End: 2023-01-11
Payer: MEDICARE

## 2023-01-11 ENCOUNTER — LAB VISIT (OUTPATIENT)
Dept: LAB | Facility: HOSPITAL | Age: 73
End: 2023-01-11
Attending: INTERNAL MEDICINE
Payer: MEDICARE

## 2023-01-11 DIAGNOSIS — M54.40 ACUTE RIGHT-SIDED LOW BACK PAIN WITH SCIATICA, SCIATICA LATERALITY UNSPECIFIED: ICD-10-CM

## 2023-01-11 DIAGNOSIS — C93.01 ACUTE MONOCYTIC LEUKEMIA IN REMISSION: ICD-10-CM

## 2023-01-11 DIAGNOSIS — T45.1X5A CHEMOTHERAPY INDUCED CARDIOMYOPATHY: Primary | ICD-10-CM

## 2023-01-11 DIAGNOSIS — C92.02 ACUTE MYELOID LEUKEMIA IN RELAPSE: ICD-10-CM

## 2023-01-11 DIAGNOSIS — I42.7 CHEMOTHERAPY INDUCED CARDIOMYOPATHY: Primary | ICD-10-CM

## 2023-01-11 LAB
ALBUMIN SERPL BCP-MCNC: 3.5 G/DL (ref 3.5–5.2)
ALP SERPL-CCNC: 74 U/L (ref 55–135)
ALT SERPL W/O P-5'-P-CCNC: 26 U/L (ref 10–44)
ANION GAP SERPL CALC-SCNC: 10 MMOL/L (ref 8–16)
AST SERPL-CCNC: 26 U/L (ref 10–40)
BASOPHILS # BLD AUTO: 0.02 K/UL (ref 0–0.2)
BASOPHILS NFR BLD: 0.3 % (ref 0–1.9)
BILIRUB SERPL-MCNC: 0.5 MG/DL (ref 0.1–1)
BUN SERPL-MCNC: 24 MG/DL (ref 8–23)
CALCIUM SERPL-MCNC: 9.5 MG/DL (ref 8.7–10.5)
CHLORIDE SERPL-SCNC: 105 MMOL/L (ref 95–110)
CO2 SERPL-SCNC: 25 MMOL/L (ref 23–29)
CREAT SERPL-MCNC: 1.1 MG/DL (ref 0.5–1.4)
DIFFERENTIAL METHOD: ABNORMAL
EOSINOPHIL # BLD AUTO: 0 K/UL (ref 0–0.5)
EOSINOPHIL NFR BLD: 0.1 % (ref 0–8)
ERYTHROCYTE [DISTWIDTH] IN BLOOD BY AUTOMATED COUNT: 14 % (ref 11.5–14.5)
EST. GFR  (NO RACE VARIABLE): 53.7 ML/MIN/1.73 M^2
GLUCOSE SERPL-MCNC: 107 MG/DL (ref 70–110)
HCT VFR BLD AUTO: 40.4 % (ref 37–48.5)
HGB BLD-MCNC: 12.4 G/DL (ref 12–16)
IMM GRANULOCYTES # BLD AUTO: 0.12 K/UL (ref 0–0.04)
IMM GRANULOCYTES NFR BLD AUTO: 1.7 % (ref 0–0.5)
LYMPHOCYTES # BLD AUTO: 0.9 K/UL (ref 1–4.8)
LYMPHOCYTES NFR BLD: 13.6 % (ref 18–48)
MCH RBC QN AUTO: 32.5 PG (ref 27–31)
MCHC RBC AUTO-ENTMCNC: 30.7 G/DL (ref 32–36)
MCV RBC AUTO: 106 FL (ref 82–98)
MONOCYTES # BLD AUTO: 0.4 K/UL (ref 0.3–1)
MONOCYTES NFR BLD: 5.8 % (ref 4–15)
NEUTROPHILS # BLD AUTO: 5.4 K/UL (ref 1.8–7.7)
NEUTROPHILS NFR BLD: 78.5 % (ref 38–73)
NRBC BLD-RTO: 0 /100 WBC
PLATELET # BLD AUTO: 59 K/UL (ref 150–450)
PMV BLD AUTO: 12 FL (ref 9.2–12.9)
POTASSIUM SERPL-SCNC: 4.6 MMOL/L (ref 3.5–5.1)
PROT SERPL-MCNC: 6.3 G/DL (ref 6–8.4)
RBC # BLD AUTO: 3.81 M/UL (ref 4–5.4)
SODIUM SERPL-SCNC: 140 MMOL/L (ref 136–145)
WBC # BLD AUTO: 6.91 K/UL (ref 3.9–12.7)

## 2023-01-11 PROCEDURE — 36415 COLL VENOUS BLD VENIPUNCTURE: CPT | Performed by: INTERNAL MEDICINE

## 2023-01-11 PROCEDURE — 85025 COMPLETE CBC W/AUTO DIFF WBC: CPT | Performed by: INTERNAL MEDICINE

## 2023-01-11 PROCEDURE — 99215 OFFICE O/P EST HI 40 MIN: CPT | Mod: S$PBB,,, | Performed by: INTERNAL MEDICINE

## 2023-01-11 PROCEDURE — 80053 COMPREHEN METABOLIC PANEL: CPT | Performed by: INTERNAL MEDICINE

## 2023-01-11 PROCEDURE — 99215 PR OFFICE/OUTPT VISIT, EST, LEVL V, 40-54 MIN: ICD-10-PCS | Mod: S$PBB,,, | Performed by: INTERNAL MEDICINE

## 2023-01-19 NOTE — PROGRESS NOTES
Hematology and Medical Oncology   Follow Up Note     01/11/2023    Primary Oncologic Diagnosis: AML, FLT 3 + and NPMN1+    History of Present Ilness:   Sabrina Badillo) is a pleasant 72 y.o.female presents to clinic following 2 induction therapies for AML. She was in the hospital roughly 50 days to receive 7+3 and then FLAG JENNIFER. Now on active observation.    Oncology History:   68 y.o. female admitted overnight for possible new AML. Came in from OSH with elevated WBC of 100.   05/25/2018: Pt is now on hydrea 2 grams BID, allopurinol 300 mg daily, and IVF. Pt may need rasburicase this weekend. She will undergo a BM bx and aspiration today. She is an induction candidate and will not be screened for research trials. She has anxiety for which she usually takes xanax, this was re-started.   05/26/2018 PICC placed. Reports she slept well last night. Anxiety improved with prn xanax. EF 65%, HIV and Hep panel negative. 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  6/12/2018: Day 15 of 7+3 induction, restaging BM bx done yesterday. On Midostaurin. Fever yesterday and BC with gram + cocci in clusters. On cefepime day 2 and will start Vanc today. Labial mucositis improving.   6/13/2018: Day 16 7+3. BC with staph aureus, identification pending. Surveillance blood cultures done today. Afebrile x 48 hours. On cefepime and Vanc. Labial mucositis resolved. No complaints of pain. Nausea controlled. No diarrhea. Primary complaint is fatigue.   6/14/20018: Day 17 of 7+3. Day 14 bone marrow results pending. BC with staph epidermis only sensitive to Vancomycin. Vanc day 3 and cefepime day 4. Vanc trough 12.2 last night. BP remains low but stable. Afebrile x 72 hours.   6/15/2018: Day 18 of 7+3. Day 14 bone marrow biopsy shows persistent disease with 15% blasts. Discussion with patient regarding treatment and she is deciding if she wants to proceed with re-induction vs outpatient maintenance. Temp of 100.4 overnight, unsustained.  Day 5 Cefepime and Day 4 of Vanc for staph epidermis. Repeat cultures NGTD. BP improved. Electrolytes (mag, phos, K and calcium) replaced aggressively this am and will repeat labs this afternoon. No complaints this am.   06/16/2018: Day 19 of 7+3. Day 2 of Flag-JENNIFER. Remains afebrile. Calcium remains low and have increased PO supplementation. Remains on vanc and aztreonam. Somewhat loopy feeling after receiving benadryl.   06/17/2018: Day 20 of 7+3. Day 3 of FLAG-JENNIFER. Multiple electrolyte abnormalities. New bilateral leg and feet swelling.   6/18/2018: Day 21 of 7+3 and Day 4 of FLAG JENNIFER. Tolerating well with some nausea controlled with Zofran. VSS, afebrile. ANC 1900 on Neupogen. Continues Vanc for staph epi bacteremia. Multiple electrolytes replaced today. Complains of edema to lower extremities, not improved after 20 of lasix yesterday. No sob or CP.   6/19/18: Day 22 of 7+3 and Day 5 of FLAG JENNIFER. VSS, afebrile, ANC 3900. Vanc trough elevated and dose adjusted this am. Lower extremity edema improved after 40 of lasix yesterday, net negative 300 cc I&O. Mild nausea, no abdominal pain or diarrhea. Poor appetite but no difficulty eating or taking pills.   6/20/18: Day 23 of 7+3 and Day 6 of FLAG JENNIFER. Patient complains of nausea and vomiting overnight and this am, especially when taking pills. Will add Zyprexa daily in addition to Zofran, compazine, and ativan PRN and will change meds to IV. 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 Afebrile.  No diarrhea, mouth sores or pain.  06/21/2018: Day 24 of 7+3 and Day 7 of FLAG JENNIFER. Nausea better controlled. Continued on Vanc.  6/22/2018: Day 25 of 7+3 and Day 8 of FLAG JENNIFER. Nausea improved some with Zyprexa but did have 1 episode of emesis overnight.continuing meds IV due to vomiting. Remains afebrile. ANC 0. Continues Vanc and Flagyl. Electrolytes replaced.   06/23/2018 : day 26 of 7+3 and Day 9 of FLAG  JENNIFER.  Nausea persists, worsened after taking pills so meds converted to IV.  Encouraged ambulation.  Continue with flagyl for leptotrichia goodfellowii.  RUQ US showed intrahepatic dilation, overall impression hepatic steatosis.  CBD 0.5cm.  No Gallbladder.    06/24/2018 : day 27 of 7+3 and Day 10 of FLAG JENNIFER.  Nausea persisting, able to tolerate some po pills.  Last day of flagyl (day 5).  Still pancytopenic. Appetite still low as po intake triggers nausea.  06/25/2018: day 28 of 7+3 and Day 11 of FLAG JENNIFER. Afebrile, ANC 0. Has completed Flagyl. Will decrease Vanc to 1000 mg q12, trough 19.9, will complete Vanc on 6/27. Liver enzymes stable on Midostaurin. VSS.   6/26/2018: day 29 of 7+3 and day 12 of FLAG JENNIFER. Liver enzymes improved and Midostaurin increased to full dose 50 mg bid. Nausea controlled, afebrile, VSS, NEON.  6/27/2018: day 30 of 7+3 and Day 13 of FLAG JENNIFER. Persistent nausea and emesis x 1 yesterday. Compazine changed to scheduled. Vanc ends today. Afebrile, VSS. Aggressive electrolyte replacement, low electrolytes possibly due to Midostaurin.   6/28/2018: day 31 of 7+3 and Day 14 of FLAG JENNIFER. Nausea improved with scheduled Compazine. Patient has agreed to start converting some IV meds to po. VSS, afebrile, electrolytes wnl today. Only complains of fatigue, new rash noted to upper back and chest and legs, papular rash mildly red.  6/29/2018: Day 32 of 7+3 and Day 15 of FLAG JENNIFER. Day 14 restaging bone marrow biopsy done at bedside today. Patient states nausea improved but she did have emesis last night after taking 9 pm pills. Rash improved. No mouth sores, diarrhea or pain.   7/2/2018: Day 35 of 7+3 and Day 18 of FLAG JENNIFER. Restaging BM bx results pending. Fluid overload over the weekend. Chest x-ray with pulmonary edema. Os sats down to 88%. Placed on O2 at 2 L NC, off O2 this am. Received lasix. Net negative 2.7 L today. 1 episode of nausea, no emesis. Reports anxiety relieved with PRN meds.  Electrolytes improved, afebrile, VSS.   7/3/2018: Day 36 of 7+3 and Day 19 of FLAG JENNIFER. Restaging Bm bx with GABRIEL. Cardiology consulted for abnormal echo, EF 30% with pulmonary HTN and severe L atrial enlargement. EKG with T wave abnormality, possible anterolateral ischemia and prolonged QTc of 530. Medications adjusted. Nausea controlled, no diarrhea. Electrolytes improved. On O2 as needed.   07/04/2018 : Day 37 of 7+3 and Day 20 of FLAG JENNIFER Diarrhea in AM, watery, no associated abdominal pain, nausea, or vomiting.    07/05/2018: Day 38 of 7+3 ane Day 21 of FLAG JENNIFER. No CP or SOB, cardiology following. On RA. All meds changed to po, denies nausea or vomiting and no diarrhea. VSS, afebrile.   7/6/2018: Day 22 of FLAG JENNIFER. Continues to tolerate po meds with no nausea. Afebrile. Awaiting count recovery for discharge.  7/7/2018-/8/2018: Day 40/41 of 7+3, Day 23/24 of FLAG JENNIFER.  Improving clinically.  Started on mag oxide per patient request.  Labs showing signs of ANC recovery.  7/9/2018: Day 25 FLAG JENNIFER, , continues Neupogen. Received platelets today. Afebrile, VSS. Tolerating all oral meds with no nausea or vomiting. Only complains of fatigue.   7/10/2018: Day 26 FLAG JENNIFER,  today. Had 2 episodes of vomiting and diarrhea last night. Feeling better. Afebrile, VSS.   7/11/2018: Day 27 FLAG JENNIFER,  today. No vomiting or diarrhea overnight and no nausea. Afebrile, VSS. Complains of back pain (bone pain) suspect due to count recovery. Relieved with oxy IR and Zyrtec started.  07/12/2018: Day 28 flag jennifer, engrafted today with ANC of 730. Back pain improved with zyrtec. Scheduled for IT chemo tomorrow at 1:30 pm and discharge home thereafter. Will likely need plt transfusion prior to IT chemo tomorrow   7/13/2018: Day 29 of FLAG JENNIFER. ANC up to 1300 today. Transfusing platelets today prior to LP for IT chemo. Patient continues to complain of bone pain, mostly to back and legs. No nausea, diarrhea, sob.  Afebrile and VSS.   --hospitalized 7/23-7/25 for C.diff diarrhea, neutropenic fever.  While inpatient she developed pink blisters on her palm that were concerning for relapse.  --palm biopsy on 7/26/18 for suspicion of leukemia cutis was also negative for sign of relapse  --bone marrow biopsy 7/31/18 was negative for signs of relapse   --Admitted on 8/14/18 for HiDAC#1  --Admitted on 9/11/18 for HiDAC#2  --Admitted on 10/16/18 for HiDAC#3  --Post consolidation bone marrow biopsy on 12/27/2018: AML FISH (BM) Interpretation: The result is within normal limits for the AML FISH panel. No evidence of residual disease.  --Bone Marrow Biopsy on 5/9/19 NO MORPHOLOGIC EVIDENCE OF RESIDUAL ACUTE MYELOID LEUKEMIA. RECOMMEND NGS STUDY TO RULE OUT RESIDUAL DISEASE. deletion 20q  --Bone Marrow Biopsy on 9/23/2020: CELLULARITY=40-50%, TRILINEAGE HEMATOPOIETIC ACTIVITY (M/E= 0.6:1). ERYTHROID HYPERPLASIA, DYSERYTHROPOIESIS, AND INCREASED BLAST (11%). SEE COMMENT.  FOCAL GRADE 1 RETICULAR FIBROSIS.  --Decitabine + Venetoclax: C1D1: 10/12/2020, C2D1: 11/9  --Bone marrow biopsy on 2/9/2021: VARIABLY CELLULAR MARROW WITH DYSERYTHROPOIESIS, DYSMEGAKARYOPOIESIS AND  MARKED MYELOID HYPOPLASIA.NO MORPHOLOGIC EVIDENCE OF RESIDUAL ACUTE MYELOID LEUKEMIA.    Interval History:   Mrs. Mac is a 72 y.o. patient who presents with her  for ongoing lab and symptom check for her AML. Joints acted up over the holidays, mainly with the flip flopping weather of cold to hot.     Energy is good. No new symptoms. No petechie.    Past Medical History:   Past Medical History:   Diagnosis Date    Cancer 03/2018    AML    CHF (congestive heart failure)     Diarrhea 9/14/2018    Encounter for blood transfusion        Current Medications:   Current Outpatient Medications   Medication Sig    acetaminophen (TYLENOL) 500 MG tablet Take 500 mg by mouth every 6 (six) hours as needed.    ALPRAZolam (XANAX) 2 MG Tab TAKE ONE TABLET BY MOUTH NIGHTLY AS NEEDED     doxycycline (DORYX) 100 MG EC tablet Take 1 tablet (100 mg total) by mouth every 12 (twelve) hours. (Patient not taking: Reported on 5/20/2022)    EScitalopram oxalate (LEXAPRO) 20 MG tablet TAKE ONE TABLET BY MOUTH IN THE EVENING    famotidine (PEPCID) 20 MG tablet Take 20 mg by mouth.    fluconazole (DIFLUCAN) 200 MG Tab     lactulose (CHRONULAC) 20 gram/30 mL Soln Take 23 mLs (15 g total) by mouth 3 (three) times daily as needed. (Patient not taking: Reported on 5/6/2021)    LORazepam (ATIVAN) 1 MG tablet Take 1 tablet (1 mg total) by mouth 2 (two) times daily. (Patient not taking: No sig reported)    methocarbamoL (ROBAXIN) 500 MG Tab Take 1 tablet (500 mg total) by mouth 4 (four) times daily.    metoprolol succinate (TOPROL-XL) 25 MG 24 hr tablet TAKE ONE TABLET BY MOUTH DAILY    omeprazole (PRILOSEC) 40 MG capsule Take 1 capsule (40 mg total) by mouth every morning.    ondansetron (ZOFRAN) 8 MG tablet Take 1 tablet (8 mg total) by mouth every 12 (twelve) hours as needed for Nausea. (Patient not taking: Reported on 5/20/2022)    oxyCODONE (ROXICODONE) 10 mg Tab immediate release tablet Take 1 tablet (10 mg total) by mouth every 6 (six) hours as needed (pain).    oxyCODONE (ROXICODONE) 10 mg Tab immediate release tablet Take 1 tablet (10 mg total) by mouth every 6 (six) hours as needed (pain).    polyethylene glycol (GLYCOLAX) 17 gram PwPk Take 17 g by mouth once daily.    predniSONE (DELTASONE) 20 MG tablet Take 1 tablet (20 mg total) by mouth daily as needed (as needed).    pregabalin (LYRICA) 75 MG capsule Take 1 capsule (75 mg total) by mouth every evening. (Patient not taking: No sig reported)    senna-docusate 8.6-50 mg (PERICOLACE) 8.6-50 mg per tablet Take 1 tablet by mouth once daily. (Patient not taking: Reported on 5/6/2021)    venetoclax (VENCLEXTA) 100 mg Tab Take 2 tablets (200 mg total) by mouth once daily. (Patient not taking: Reported on 5/6/2021.)     No current facility-administered  medications for this visit.     ALLERGIES:   Review of patient's allergies indicates:   Allergen Reactions    Methotrexate analogues      Elevated Liver Enzyme    Bactrim [sulfamethoxazole-trimethoprim] Nausea And Vomiting and Rash       Review of Systems:     Review of Systems   Constitutional: Positive for fatigue (improving). Negative for appetite change, chills, diaphoresis, fever and unexpected weight change.   HENT:   Negative for hearing loss, mouth sores, nosebleeds, sore throat, trouble swallowing and voice change.    Eyes: Negative for eye problems and icterus.   Respiratory: Negative for chest tightness, cough, hemoptysis, shortness of breath and wheezing.    Cardiovascular: Negative for chest pain, leg swelling and palpitations.   Gastrointestinal: Negative for abdominal distention, abdominal pain, blood in stool, diarrhea, nausea and vomiting.   Endocrine: Negative for hot flashes.   Genitourinary: Negative for bladder incontinence, difficulty urinating, dysuria and hematuria.    Musculoskeletal: Positive for arthralgias and myalgias. Negative for back pain, flank pain, gait problem, neck pain and neck stiffness.   Skin: Negative for itching, rash and wound.   Neurological: Negative for dizziness, extremity weakness, gait problem, headaches, numbness, seizures and speech difficulty.   Hematological: Negative for adenopathy. + Bruise. Does not  bleed easily.   Psychiatric/Behavioral: Negative for confusion, depression and sleep disturbance. The patient is not nervous/anxious.        Physical Exam:     There were no vitals filed for this visit.      Constitutional:       Appearance: She is well-developed.   HENT:      Head: Normocephalic and atraumatic.      Right Ear: External ear normal.      Left Ear: External ear normal.      Mouth/Throat:      Pharynx: No posterior oropharyngeal erythema.   Eyes:      Conjunctiva/sclera: Conjunctivae normal.   Cardiovascular:      Rate and Rhythm: Normal rate and  regular rhythm.      Heart sounds: Normal heart sounds. No murmur heard.     Pulmonary:      Effort: Pulmonary effort is normal. No respiratory distress.      Breath sounds: Normal breath sounds. No stridor. No wheezing or rales.   Abdominal:      General: Bowel sounds are normal. There is no distension.      Palpations: Abdomen is soft.      Tenderness: There is no abdominal tenderness.   Musculoskeletal:         General: Normal range of motion.      Cervical back: Normal range of motion.      Right lower leg: No edema.      Left lower leg: No edema.   Skin:     General: Skin is warm and dry.      Findings: No rash.   Neurological:      Mental Status: She is alert and oriented to person, place, and time.   Psychiatric:         Behavior: Behavior normal.         Thought Content: Thought content normal.         Judgment: Judgment normal.       ECOG Performance Status: (foot note - ECOG PS provided by Eastern Cooperative Oncology Group) 2 - Symptomatic, <50% confined to bed    Karnofsky Performance Score:  80%- Normal Activity with Effort: Some Symptoms of Disease    Labs:   Lab Results   Component Value Date    WBC 6.91 01/11/2023    HGB 12.4 01/11/2023    HCT 40.4 01/11/2023    PLT 59 (L) 01/11/2023    ALT 26 01/11/2023    AST 26 01/11/2023     01/11/2023    K 4.6 01/11/2023     01/11/2023    CREATININE 1.1 01/11/2023    BUN 24 (H) 01/11/2023    CO2 25 01/11/2023    TSH 1.913 10/17/2019    INR 0.9 01/31/2021    HGBA1C 6.0 (H) 01/31/2021       Imaging: previous imaging has been reviewed    Assessment and Plan:     Mrs. Mac is a 72 year old female with AML in second remission.     AML  -- Admitted from [facility] on [facility]18 early AM with WBC around 100s, for suspected new non-M3 AML  --lo res HLA typing on 5/26/18 and hi res on 5/27/18 done in anticipation of possible need for future transplant   --Pt with two daughters, two full sisters (in their mid 60s, one with brain aneurysm hx, other one  without any medical issues), and one full brother (59 y/o healthy).  --NPM1 +, CEBPA (-), FLT3 (+).  On Midostaurin 50 mg PO BID until Day 14 (held starting 6/8 to 6/11). Stopped when FLAG JENNIFER re-induction started. Restarted Midostaurin at 25 mg bid on day 8 of FLAG JENNIFER induction (6/22), increase to full dose 50 mg bid (6/26) as improvement in liver enzymes. Stopped 7/3/18 due to abnormal cardiac echo.  --day 14 bone marrow biopsy completed 6/11 showing persistent disease with 15% CD 34 positive blasts  --Day 60 of FLAG-JENNIFER, tolerating without difficulty except nausea/vomiting  --day 14 restaging bone marrow biopsy done 6/29 without complication, results with no evidence of AML, FLT 3 negative, will need repeat marrow at count recovery outpatient   --recovery marrow showed no evidence of disease  --HiDAC #1 on 8/14/18, HiDAC#2 on 9/11/18  --Cycle 3 delayed 1 week due to pancytopenia. Started on 10/16/18  --recovery bone marrow showed no signs of residual leukemia  --Bone marrow biopsy done last week reveals early relapse. 11% blasts. We discussed the possibility of re-induction, but she is adamantly against. She does not want to be admitted to the hospital.  --Plan for single agent Gilteritinib 120mg has changed based on next gen sequencing that reports FLT-3 negative at this time  --Completed 4 cycles of Decitabine + Venetoclax  --Most recent marrow shows no evidence of disease  --Will continue with supportive care  --Continue to follow monthly labs    Abnormal Liver Function Tests  --ALT and AST and Alk phos have again increased substantially since re-starting midostaurin.   --midostaurin started day 8 at 25 mg bid, monitoring liver enzymes closely and improved. Increasing Midostaurin to 50 mg bid 6/26. Stopped Midostaurin (7/3) due to new diagnosis of systolic heart failure EF 35%.  --6/22/18 liver US showing mild intrahepatic dilation, otherwise impression of hepatic steatosis.  Will hold off of MRCP at this  time.    --permanently discontinued midostaurin   --LFTs wnl    Chemotherapy induced cardiomyopathy  --cardiology follow-up as outpatient in 3 months  --repeat 2D echo on 6/15 with preserved EF of 60%.  However 7/2/18 echo with reduced EF 30-35%  --Echo on 8/15/18 was within normal range EF of 60-65%    Rheumatologic issues  --Previously on steroids and methotrexate  --Now on single agent low dose steroids [she self adjusts this medication]  --previously worked with [facility]      30 minutes were spent face to face with the patient and her family to discuss the disease, natural history, treatment options and survival statistics. I have provided the patient with an opportunity to ask questions and have all questions answered to her satisfaction.      she will return to the clinic in 4 weeks, but knows to call in the interim if symptoms change or should a problem arise.        Laquita Troy MD  Hematology and Medical Oncology  Bone Marrow Transplant  [facility]             BMT Chart Routing      Follow up with physician 4 weeks. 1. see me in 1 month with a cbc,cmp [likes early afternoon appointments]   Follow up with SHORTY    Provider visit type    Infusion scheduling note    Injection scheduling note    Labs    Imaging    Pharmacy appointment    Other referrals

## 2023-01-31 ENCOUNTER — PATIENT MESSAGE (OUTPATIENT)
Dept: INFECTIOUS DISEASES | Facility: CLINIC | Age: 73
End: 2023-01-31
Payer: MEDICARE

## 2023-02-01 ENCOUNTER — RESEARCH ENCOUNTER (OUTPATIENT)
Dept: RESEARCH | Facility: HOSPITAL | Age: 73
End: 2023-02-01
Payer: MEDICARE

## 2023-02-01 ENCOUNTER — PATIENT MESSAGE (OUTPATIENT)
Dept: INFECTIOUS DISEASES | Facility: CLINIC | Age: 73
End: 2023-02-01
Payer: MEDICARE

## 2023-02-01 DIAGNOSIS — C93.00 ACUTE MONOCYTIC LEUKEMIA NOT HAVING ACHIEVED REMISSION: Primary | ICD-10-CM

## 2023-02-01 DIAGNOSIS — Z00.6 EXAMINATION OF PARTICIPANT IN CLINICAL TRIAL: ICD-10-CM

## 2023-02-01 NOTE — PROGRESS NOTES
The Legally Authorized Representative (LAR)/ person granting permission for Ms. Reynolds, her daughter Lena Carrion,was called today regarding the patient's participation in (IRB #2015.101 PI: Prema).   The Verbal Informed Consent was read and discussed by the consenter. The following was discussed:  Types of specimens to be collected  All medical information released to researchers will be stripped of identifiers and no patient information will be given to anyone outside of this research project.   Participating in a research study is not the same as getting regular medical care and will not improve the patient's health. The purpose of a research study is to gather information.  Being in this study does not interfere with your regular medical care.  The patient/LAR does not have to participate in this study. If they do not join, their care at [facility] will not be affected.  The person granting permission was provided adequate time to ask questions regarding the scope and purpose of the study.  Permission was obtained by telephone.   The above statements were read by the person obtaining permission to the person granting permission and witnessed by Pepe Moreno. The witness information was documented on the verbal consent form as well.  This Verbal Informed Consent process was conducted prior to initiation of any study procedures.

## 2023-02-01 NOTE — PROGRESS NOTES
Ms. Mac was called today regarding her participation in (IRB #2015.101 PI: Prema).   The Verbal Informed Consent was read and discussed by the consenter. The following was discussed:  Types of specimens to be collected  All medical information released to researchers will be stripped of identifiers and no patient information will be given to anyone outside of this research project.   Participating in a research study is not the same as getting regular medical care and will not improve the patient's health. The purpose of a research study is to gather information.  Being in this study does not interfere with your regular medical care.  The patient does not have to participate in this study. If they do not join, their care at [facility] will not be affected.  The person granting permission was provided adequate time to ask questions regarding the scope and purpose of the study.  Permission was obtained by telephone.   The above statements were read by the person obtaining permission to the person granting permission and witnessed by Pepe Moreno. The witness information was documented on the verbal consent form as well.  This Verbal Informed Consent process was conducted prior to initiation of any study procedures.

## 2023-02-06 ENCOUNTER — LAB VISIT (OUTPATIENT)
Dept: LAB | Facility: HOSPITAL | Age: 73
End: 2023-02-06
Payer: MEDICARE

## 2023-02-06 ENCOUNTER — OFFICE VISIT (OUTPATIENT)
Dept: HEMATOLOGY/ONCOLOGY | Facility: CLINIC | Age: 73
End: 2023-02-06
Payer: MEDICARE

## 2023-02-06 VITALS
SYSTOLIC BLOOD PRESSURE: 143 MMHG | HEART RATE: 91 BPM | HEIGHT: 64 IN | BODY MASS INDEX: 35.76 KG/M2 | RESPIRATION RATE: 16 BRPM | WEIGHT: 209.44 LBS | DIASTOLIC BLOOD PRESSURE: 60 MMHG | OXYGEN SATURATION: 95 %

## 2023-02-06 DIAGNOSIS — Z00.6 EXAMINATION OF PARTICIPANT IN CLINICAL TRIAL: ICD-10-CM

## 2023-02-06 DIAGNOSIS — M35.1 MCTD (MIXED CONNECTIVE TISSUE DISEASE): ICD-10-CM

## 2023-02-06 DIAGNOSIS — D64.9 SYMPTOMATIC ANEMIA: ICD-10-CM

## 2023-02-06 DIAGNOSIS — C92.02 ACUTE MYELOID LEUKEMIA IN RELAPSE: ICD-10-CM

## 2023-02-06 DIAGNOSIS — C92.02 AML (ACUTE MYELOID LEUKEMIA) IN RELAPSE: Primary | ICD-10-CM

## 2023-02-06 DIAGNOSIS — C93.00 ACUTE MONOCYTIC LEUKEMIA NOT HAVING ACHIEVED REMISSION: ICD-10-CM

## 2023-02-06 LAB
ALBUMIN SERPL BCP-MCNC: 3.7 G/DL (ref 3.5–5.2)
ALP SERPL-CCNC: 83 U/L (ref 55–135)
ALT SERPL W/O P-5'-P-CCNC: 28 U/L (ref 10–44)
ANION GAP SERPL CALC-SCNC: 15 MMOL/L (ref 8–16)
AST SERPL-CCNC: 30 U/L (ref 10–40)
BASOPHILS # BLD AUTO: 0.02 K/UL (ref 0–0.2)
BASOPHILS NFR BLD: 0.4 % (ref 0–1.9)
BILIRUB SERPL-MCNC: 0.6 MG/DL (ref 0.1–1)
BUN SERPL-MCNC: 19 MG/DL (ref 8–23)
CALCIUM SERPL-MCNC: 10.1 MG/DL (ref 8.7–10.5)
CHLORIDE SERPL-SCNC: 102 MMOL/L (ref 95–110)
CO2 SERPL-SCNC: 23 MMOL/L (ref 23–29)
CREAT SERPL-MCNC: 1.1 MG/DL (ref 0.5–1.4)
DIFFERENTIAL METHOD: ABNORMAL
DRUG STUDY SPECIMEN TYPE: NORMAL
DRUG STUDY TEST NAME: NORMAL
DRUG STUDY TEST RESULT: NORMAL
EOSINOPHIL # BLD AUTO: 0 K/UL (ref 0–0.5)
EOSINOPHIL NFR BLD: 0.2 % (ref 0–8)
ERYTHROCYTE [DISTWIDTH] IN BLOOD BY AUTOMATED COUNT: 14.4 % (ref 11.5–14.5)
EST. GFR  (NO RACE VARIABLE): 53.7 ML/MIN/1.73 M^2
GLUCOSE SERPL-MCNC: 98 MG/DL (ref 70–110)
HCT VFR BLD AUTO: 41.1 % (ref 37–48.5)
HGB BLD-MCNC: 12.8 G/DL (ref 12–16)
IMM GRANULOCYTES # BLD AUTO: 0.12 K/UL (ref 0–0.04)
IMM GRANULOCYTES NFR BLD AUTO: 2.2 % (ref 0–0.5)
LYMPHOCYTES # BLD AUTO: 1.6 K/UL (ref 1–4.8)
LYMPHOCYTES NFR BLD: 28.9 % (ref 18–48)
MCH RBC QN AUTO: 32.6 PG (ref 27–31)
MCHC RBC AUTO-ENTMCNC: 31.1 G/DL (ref 32–36)
MCV RBC AUTO: 105 FL (ref 82–98)
MONOCYTES # BLD AUTO: 0.5 K/UL (ref 0.3–1)
MONOCYTES NFR BLD: 9.2 % (ref 4–15)
NEUTROPHILS # BLD AUTO: 3.2 K/UL (ref 1.8–7.7)
NEUTROPHILS NFR BLD: 59.1 % (ref 38–73)
NRBC BLD-RTO: 2 /100 WBC
PLATELET # BLD AUTO: 52 K/UL (ref 150–450)
PMV BLD AUTO: 12.9 FL (ref 9.2–12.9)
POTASSIUM SERPL-SCNC: 4 MMOL/L (ref 3.5–5.1)
PROT SERPL-MCNC: 6.6 G/DL (ref 6–8.4)
RBC # BLD AUTO: 3.93 M/UL (ref 4–5.4)
SODIUM SERPL-SCNC: 140 MMOL/L (ref 136–145)
WBC # BLD AUTO: 5.46 K/UL (ref 3.9–12.7)

## 2023-02-06 PROCEDURE — 99000 SPECIMEN HANDLING OFFICE-LAB: CPT | Performed by: INTERNAL MEDICINE

## 2023-02-06 PROCEDURE — 80053 COMPREHEN METABOLIC PANEL: CPT | Performed by: INTERNAL MEDICINE

## 2023-02-06 PROCEDURE — 99215 PR OFFICE/OUTPT VISIT, EST, LEVL V, 40-54 MIN: ICD-10-PCS | Mod: S$PBB,,, | Performed by: INTERNAL MEDICINE

## 2023-02-06 PROCEDURE — 99999 PR PBB SHADOW E&M-EST. PATIENT-LVL IV: ICD-10-PCS | Mod: PBBFAC,,, | Performed by: INTERNAL MEDICINE

## 2023-02-06 PROCEDURE — 85025 COMPLETE CBC W/AUTO DIFF WBC: CPT | Performed by: INTERNAL MEDICINE

## 2023-02-06 PROCEDURE — 99999 PR PBB SHADOW E&M-EST. PATIENT-LVL IV: CPT | Mod: PBBFAC,,, | Performed by: INTERNAL MEDICINE

## 2023-02-06 PROCEDURE — 99214 OFFICE O/P EST MOD 30 MIN: CPT | Mod: PBBFAC | Performed by: INTERNAL MEDICINE

## 2023-02-06 PROCEDURE — 36415 COLL VENOUS BLD VENIPUNCTURE: CPT | Performed by: INTERNAL MEDICINE

## 2023-02-06 PROCEDURE — 99215 OFFICE O/P EST HI 40 MIN: CPT | Mod: S$PBB,,, | Performed by: INTERNAL MEDICINE

## 2023-02-06 NOTE — PROGRESS NOTES
Hematology and Medical Oncology   Follow Up Note     02/06/2023    Primary Oncologic Diagnosis: AML, FLT 3 + and NPMN1+    History of Present Ilness:   Sabrina Badillo) is a pleasant 72 y.o.female presents to clinic following 2 induction therapies for AML. She was in the hospital roughly 50 days to receive 7+3 and then FLAG JENNIFER. Now on active observation.    Oncology History:   68 y.o. female admitted overnight for possible new AML. Came in from OSH with elevated WBC of 100.   05/25/2018: Pt is now on hydrea 2 grams BID, allopurinol 300 mg daily, and IVF. Pt may need rasburicase this weekend. She will undergo a BM bx and aspiration today. She is an induction candidate and will not be screened for research trials. She has anxiety for which she usually takes xanax, this was re-started.   05/26/2018 PICC placed. Reports she slept well last night. Anxiety improved with prn xanax. EF 65%, HIV and Hep panel negative. 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  6/12/2018: Day 15 of 7+3 induction, restaging BM bx done yesterday. On Midostaurin. Fever yesterday and BC with gram + cocci in clusters. On cefepime day 2 and will start Vanc today. Labial mucositis improving.   6/13/2018: Day 16 7+3. BC with staph aureus, identification pending. Surveillance blood cultures done today. Afebrile x 48 hours. On cefepime and Vanc. Labial mucositis resolved. No complaints of pain. Nausea controlled. No diarrhea. Primary complaint is fatigue.   6/14/20018: Day 17 of 7+3. Day 14 bone marrow results pending. BC with staph epidermis only sensitive to Vancomycin. Vanc day 3 and cefepime day 4. Vanc trough 12.2 last night. BP remains low but stable. Afebrile x 72 hours.   6/15/2018: Day 18 of 7+3. Day 14 bone marrow biopsy shows persistent disease with 15% blasts. Discussion with patient regarding treatment and she is deciding if she wants to proceed with re-induction vs outpatient maintenance. Temp of 100.4 overnight, unsustained.  Day 5 Cefepime and Day 4 of Vanc for staph epidermis. Repeat cultures NGTD. BP improved. Electrolytes (mag, phos, K and calcium) replaced aggressively this am and will repeat labs this afternoon. No complaints this am.   06/16/2018: Day 19 of 7+3. Day 2 of Flag-JENNIFER. Remains afebrile. Calcium remains low and have increased PO supplementation. Remains on vanc and aztreonam. Somewhat loopy feeling after receiving benadryl.   06/17/2018: Day 20 of 7+3. Day 3 of FLAG-JENNIFER. Multiple electrolyte abnormalities. New bilateral leg and feet swelling.   6/18/2018: Day 21 of 7+3 and Day 4 of FLAG JENNIFER. Tolerating well with some nausea controlled with Zofran. VSS, afebrile. ANC 1900 on Neupogen. Continues Vanc for staph epi bacteremia. Multiple electrolytes replaced today. Complains of edema to lower extremities, not improved after 20 of lasix yesterday. No sob or CP.   6/19/18: Day 22 of 7+3 and Day 5 of FLAG JENNIFER. VSS, afebrile, ANC 3900. Vanc trough elevated and dose adjusted this am. Lower extremity edema improved after 40 of lasix yesterday, net negative 300 cc I&O. Mild nausea, no abdominal pain or diarrhea. Poor appetite but no difficulty eating or taking pills.   6/20/18: Day 23 of 7+3 and Day 6 of FLAG JENNIFER. Patient complains of nausea and vomiting overnight and this am, especially when taking pills. Will add Zyprexa daily in addition to Zofran, compazine, and ativan PRN and will change meds to IV. 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 Afebrile.  No diarrhea, mouth sores or pain.  06/21/2018: Day 24 of 7+3 and Day 7 of FLAG JENNIFER. Nausea better controlled. Continued on Vanc.  6/22/2018: Day 25 of 7+3 and Day 8 of FLAG JENNIFER. Nausea improved some with Zyprexa but did have 1 episode of emesis overnight.continuing meds IV due to vomiting. Remains afebrile. ANC 0. Continues Vanc and Flagyl. Electrolytes replaced.   06/23/2018 : day 26 of 7+3 and Day 9 of FLAG  JENNIFER.  Nausea persists, worsened after taking pills so meds converted to IV.  Encouraged ambulation.  Continue with flagyl for leptotrichia goodfellowii.  RUQ US showed intrahepatic dilation, overall impression hepatic steatosis.  CBD 0.5cm.  No Gallbladder.    06/24/2018 : day 27 of 7+3 and Day 10 of FLAG JENNIFER.  Nausea persisting, able to tolerate some po pills.  Last day of flagyl (day 5).  Still pancytopenic. Appetite still low as po intake triggers nausea.  06/25/2018: day 28 of 7+3 and Day 11 of FLAG JENNIFER. Afebrile, ANC 0. Has completed Flagyl. Will decrease Vanc to 1000 mg q12, trough 19.9, will complete Vanc on 6/27. Liver enzymes stable on Midostaurin. VSS.   6/26/2018: day 29 of 7+3 and day 12 of FLAG JENNIFER. Liver enzymes improved and Midostaurin increased to full dose 50 mg bid. Nausea controlled, afebrile, VSS, NEON.  6/27/2018: day 30 of 7+3 and Day 13 of FLAG JENNIFER. Persistent nausea and emesis x 1 yesterday. Compazine changed to scheduled. Vanc ends today. Afebrile, VSS. Aggressive electrolyte replacement, low electrolytes possibly due to Midostaurin.   6/28/2018: day 31 of 7+3 and Day 14 of FLAG JENNIFER. Nausea improved with scheduled Compazine. Patient has agreed to start converting some IV meds to po. VSS, afebrile, electrolytes wnl today. Only complains of fatigue, new rash noted to upper back and chest and legs, papular rash mildly red.  6/29/2018: Day 32 of 7+3 and Day 15 of FLAG JENNIFER. Day 14 restaging bone marrow biopsy done at bedside today. Patient states nausea improved but she did have emesis last night after taking 9 pm pills. Rash improved. No mouth sores, diarrhea or pain.   7/2/2018: Day 35 of 7+3 and Day 18 of FLAG JENNIFER. Restaging BM bx results pending. Fluid overload over the weekend. Chest x-ray with pulmonary edema. Os sats down to 88%. Placed on O2 at 2 L NC, off O2 this am. Received lasix. Net negative 2.7 L today. 1 episode of nausea, no emesis. Reports anxiety relieved with PRN meds.  Electrolytes improved, afebrile, VSS.   7/3/2018: Day 36 of 7+3 and Day 19 of FLAG JENNIFER. Restaging Bm bx with GABRIEL. Cardiology consulted for abnormal echo, EF 30% with pulmonary HTN and severe L atrial enlargement. EKG with T wave abnormality, possible anterolateral ischemia and prolonged QTc of 530. Medications adjusted. Nausea controlled, no diarrhea. Electrolytes improved. On O2 as needed.   07/04/2018 : Day 37 of 7+3 and Day 20 of FLAG JENNIFER Diarrhea in AM, watery, no associated abdominal pain, nausea, or vomiting.    07/05/2018: Day 38 of 7+3 ane Day 21 of FLAG JENNIFER. No CP or SOB, cardiology following. On RA. All meds changed to po, denies nausea or vomiting and no diarrhea. VSS, afebrile.   7/6/2018: Day 22 of FLAG JENNIFER. Continues to tolerate po meds with no nausea. Afebrile. Awaiting count recovery for discharge.  7/7/2018-/8/2018: Day 40/41 of 7+3, Day 23/24 of FLAG JENNIFER.  Improving clinically.  Started on mag oxide per patient request.  Labs showing signs of ANC recovery.  7/9/2018: Day 25 FLAG JENNIFER, , continues Neupogen. Received platelets today. Afebrile, VSS. Tolerating all oral meds with no nausea or vomiting. Only complains of fatigue.   7/10/2018: Day 26 FLAG JENNIFER,  today. Had 2 episodes of vomiting and diarrhea last night. Feeling better. Afebrile, VSS.   7/11/2018: Day 27 FLAG JENNIFER,  today. No vomiting or diarrhea overnight and no nausea. Afebrile, VSS. Complains of back pain (bone pain) suspect due to count recovery. Relieved with oxy IR and Zyrtec started.  07/12/2018: Day 28 flag jennifer, engrafted today with ANC of 730. Back pain improved with zyrtec. Scheduled for IT chemo tomorrow at 1:30 pm and discharge home thereafter. Will likely need plt transfusion prior to IT chemo tomorrow   7/13/2018: Day 29 of FLAG JENNIFER. ANC up to 1300 today. Transfusing platelets today prior to LP for IT chemo. Patient continues to complain of bone pain, mostly to back and legs. No nausea, diarrhea, sob.  Afebrile and VSS.   --hospitalized 7/23-7/25 for C.diff diarrhea, neutropenic fever.  While inpatient she developed pink blisters on her palm that were concerning for relapse.  --palm biopsy on 7/26/18 for suspicion of leukemia cutis was also negative for sign of relapse  --bone marrow biopsy 7/31/18 was negative for signs of relapse   --Admitted on 8/14/18 for HiDAC#1  --Admitted on 9/11/18 for HiDAC#2  --Admitted on 10/16/18 for HiDAC#3  --Post consolidation bone marrow biopsy on 12/27/2018: AML FISH (BM) Interpretation: The result is within normal limits for the AML FISH panel. No evidence of residual disease.  --Bone Marrow Biopsy on 5/9/19 NO MORPHOLOGIC EVIDENCE OF RESIDUAL ACUTE MYELOID LEUKEMIA. RECOMMEND NGS STUDY TO RULE OUT RESIDUAL DISEASE. deletion 20q  --Bone Marrow Biopsy on 9/23/2020: CELLULARITY=40-50%, TRILINEAGE HEMATOPOIETIC ACTIVITY (M/E= 0.6:1). ERYTHROID HYPERPLASIA, DYSERYTHROPOIESIS, AND INCREASED BLAST (11%). SEE COMMENT.  FOCAL GRADE 1 RETICULAR FIBROSIS.  --Decitabine + Venetoclax: C1D1: 10/12/2020, C2D1: 11/9  --Bone marrow biopsy on 2/9/2021: VARIABLY CELLULAR MARROW WITH DYSERYTHROPOIESIS, DYSMEGAKARYOPOIESIS AND  MARKED MYELOID HYPOPLASIA.NO MORPHOLOGIC EVIDENCE OF RESIDUAL ACUTE MYELOID LEUKEMIA.    Interval History:   Mrs. Mac is a 72 y.o. patient who presents with her  for ongoing lab and symptom check for her AML. Currently on prednisone 10mg, felt the change in energy and inflamation with the drop in dose.     Had a few bad weeks, but now energy is good. No new symptoms. No petechie.      On prednison 10mg  Past Medical History:   Past Medical History:   Diagnosis Date    Cancer 03/2018    AML    CHF (congestive heart failure)     Diarrhea 9/14/2018    Encounter for blood transfusion        Current Medications:   Current Outpatient Medications   Medication Sig    acetaminophen (TYLENOL) 500 MG tablet Take 500 mg by mouth every 6 (six) hours as needed.     ALPRAZolam (XANAX) 2 MG Tab TAKE ONE TABLET BY MOUTH NIGHTLY AS NEEDED    EScitalopram oxalate (LEXAPRO) 20 MG tablet TAKE ONE TABLET BY MOUTH IN THE EVENING    famotidine (PEPCID) 20 MG tablet Take 20 mg by mouth.    fluconazole (DIFLUCAN) 200 MG Tab     methocarbamoL (ROBAXIN) 500 MG Tab Take 1 tablet (500 mg total) by mouth 4 (four) times daily.    metoprolol succinate (TOPROL-XL) 25 MG 24 hr tablet TAKE ONE TABLET BY MOUTH DAILY    omeprazole (PRILOSEC) 40 MG capsule Take 1 capsule (40 mg total) by mouth every morning.    oxyCODONE (ROXICODONE) 10 mg Tab immediate release tablet Take 1 tablet (10 mg total) by mouth every 6 (six) hours as needed (pain).    oxyCODONE (ROXICODONE) 10 mg Tab immediate release tablet Take 1 tablet (10 mg total) by mouth every 6 (six) hours as needed (pain).    polyethylene glycol (GLYCOLAX) 17 gram PwPk Take 17 g by mouth once daily.    predniSONE (DELTASONE) 20 MG tablet Take 1 tablet (20 mg total) by mouth daily as needed (as needed).    doxycycline (DORYX) 100 MG EC tablet Take 1 tablet (100 mg total) by mouth every 12 (twelve) hours. (Patient not taking: Reported on 5/20/2022)    lactulose (CHRONULAC) 20 gram/30 mL Soln Take 23 mLs (15 g total) by mouth 3 (three) times daily as needed. (Patient not taking: Reported on 5/6/2021)    LORazepam (ATIVAN) 1 MG tablet Take 1 tablet (1 mg total) by mouth 2 (two) times daily. (Patient not taking: No sig reported)    ondansetron (ZOFRAN) 8 MG tablet Take 1 tablet (8 mg total) by mouth every 12 (twelve) hours as needed for Nausea. (Patient not taking: Reported on 5/20/2022)    pregabalin (LYRICA) 75 MG capsule Take 1 capsule (75 mg total) by mouth every evening. (Patient not taking: No sig reported)    senna-docusate 8.6-50 mg (PERICOLACE) 8.6-50 mg per tablet Take 1 tablet by mouth once daily. (Patient not taking: Reported on 5/6/2021)    venetoclax (VENCLEXTA) 100 mg Tab Take 2 tablets (200 mg total) by mouth once daily.  (Patient not taking: Reported on 5/6/2021.)     No current facility-administered medications for this visit.     ALLERGIES:   Review of patient's allergies indicates:   Allergen Reactions    Methotrexate analogues      Elevated Liver Enzyme    Bactrim [sulfamethoxazole-trimethoprim] Nausea And Vomiting and Rash       Review of Systems:     Review of Systems   Constitutional: Positive for fatigue (improving). Negative for appetite change, chills, diaphoresis, fever and unexpected weight change.   HENT:   Negative for hearing loss, mouth sores, nosebleeds, sore throat, trouble swallowing and voice change.    Eyes: Negative for eye problems and icterus.   Respiratory: Negative for chest tightness, cough, hemoptysis, shortness of breath and wheezing.    Cardiovascular: Negative for chest pain, leg swelling and palpitations.   Gastrointestinal: Negative for abdominal distention, abdominal pain, blood in stool, diarrhea, nausea and vomiting.   Endocrine: Negative for hot flashes.   Genitourinary: Negative for bladder incontinence, difficulty urinating, dysuria and hematuria.    Musculoskeletal: Positive for arthralgias and myalgias. Negative for back pain, flank pain, gait problem, neck pain and neck stiffness.   Skin: Negative for itching, rash and wound.   Neurological: Negative for dizziness, extremity weakness, gait problem, headaches, numbness, seizures and speech difficulty.   Hematological: Negative for adenopathy. + Bruise. Does not  bleed easily.   Psychiatric/Behavioral: Negative for confusion, depression and sleep disturbance. The patient is not nervous/anxious.        Physical Exam:     Vitals:    02/06/23 1333   BP: (!) 143/60   Pulse: 91   Resp: 16         Constitutional:       Appearance: She is well-developed.   HENT:      Head: Normocephalic and atraumatic.      Right Ear: External ear normal.      Left Ear: External ear normal.      Mouth/Throat:      Pharynx: No posterior oropharyngeal erythema.    Eyes:      Conjunctiva/sclera: Conjunctivae normal.   Cardiovascular:      Rate and Rhythm: Normal rate and regular rhythm.      Heart sounds: Normal heart sounds. No murmur heard.     Pulmonary:      Effort: Pulmonary effort is normal. No respiratory distress.      Breath sounds: Normal breath sounds. No stridor. No wheezing or rales.   Abdominal:      General: Bowel sounds are normal. There is no distension.      Palpations: Abdomen is soft.      Tenderness: There is no abdominal tenderness.   Musculoskeletal:         General: Normal range of motion.      Cervical back: Normal range of motion.      Right lower leg: No edema.      Left lower leg: No edema.   Skin:     General: Skin is warm and dry.      Findings: No rash.   Neurological:      Mental Status: She is alert and oriented to person, place, and time.   Psychiatric:         Behavior: Behavior normal.         Thought Content: Thought content normal.         Judgment: Judgment normal.       ECOG Performance Status: (foot note - ECOG PS provided by Eastern Cooperative Oncology Group) 2 - Symptomatic, <50% confined to bed    Karnofsky Performance Score:  80%- Normal Activity with Effort: Some Symptoms of Disease    Labs:   Lab Results   Component Value Date    WBC 5.46 02/06/2023    HGB 12.8 02/06/2023    HCT 41.1 02/06/2023    PLT 52 (L) 02/06/2023    ALT 28 02/06/2023    AST 30 02/06/2023     02/06/2023    K 4.0 02/06/2023     02/06/2023    CREATININE 1.1 02/06/2023    BUN 19 02/06/2023    CO2 23 02/06/2023    TSH 1.913 10/17/2019    INR 0.9 01/31/2021    HGBA1C 6.0 (H) 01/31/2021       Imaging: previous imaging has been reviewed    Assessment and Plan:     Mrs. Mac is a 72 year old female with AML in second remission.     AML  -- Admitted from [facility] on 5/25/18 early AM with WBC around 100s, for suspected new non-M3 AML  --lo res HLA typing on 5/26/18 and hi res on 5/27/18 done in anticipation of possible need for future transplant    --Pt with two daughters, two full sisters (in their mid 60s, one with brain aneurysm hx, other one without any medical issues), and one full brother (61 y/o healthy).  --NPM1 +, CEBPA (-), FLT3 (+).  On Midostaurin 50 mg PO BID until Day 14 (held starting 6/8 to 6/11). Stopped when FLAG JENNIFER re-induction started. Restarted Midostaurin at 25 mg bid on day 8 of FLAG JENNIFER induction (6/22), increase to full dose 50 mg bid (6/26) as improvement in liver enzymes. Stopped 7/3/18 due to abnormal cardiac echo.  --day 14 bone marrow biopsy completed 6/11 showing persistent disease with 15% CD 34 positive blasts  --Day 60 of FLAG-JENNIFER, tolerating without difficulty except nausea/vomiting  --day 14 restaging bone marrow biopsy done 6/29 without complication, results with no evidence of AML, FLT 3 negative, will need repeat marrow at count recovery outpatient   --recovery marrow showed no evidence of disease  --HiDAC #1 on 8/14/18, HiDAC#2 on 9/11/18  --Cycle 3 delayed 1 week due to pancytopenia. Started on 10/16/18  --recovery bone marrow showed no signs of residual leukemia  --Bone marrow biopsy done last week reveals early relapse. 11% blasts. We discussed the possibility of re-induction, but she is adamantly against. She does not want to be admitted to the hospital.  --Plan for single agent Gilteritinib 120mg has changed based on next gen sequencing that reports FLT-3 negative at this time  --Completed 4 cycles of Decitabine + Venetoclax  --Most recent marrow shows no evidence of disease  --Will continue with supportive care  --Continue to follow monthly labs    Abnormal Liver Function Tests  --ALT and AST and Alk phos have again increased substantially since re-starting midostaurin.   --midostaurin started day 8 at 25 mg bid, monitoring liver enzymes closely and improved. Increasing Midostaurin to 50 mg bid 6/26. Stopped Midostaurin (7/3) due to new diagnosis of systolic heart failure EF 35%.  --6/22/18 liver US showing mild  intrahepatic dilation, otherwise impression of hepatic steatosis.  Will hold off of MRCP at this time.    --permanently discontinued midostaurin   --LFTs wnl    Chemotherapy induced cardiomyopathy  --cardiology follow-up as outpatient in 3 months  --repeat 2D echo on 6/15 with preserved EF of 60%.  However 7/2/18 echo with reduced EF 30-35%  --Echo on 8/15/18 was within normal range EF of 60-65%    Rheumatologic issues  --Previously on steroids and methotrexate  --Now on single agent low dose steroids [she self adjusts this medication]  --previously worked with [facility] [place]      30 minutes were spent face to face with the patient and her family to discuss the disease, natural history, treatment options and survival statistics. I have provided the patient with an opportunity to ask questions and have all questions answered to her satisfaction.      she will return to the clinic in 4 weeks, but knows to call in the interim if symptoms change or should a problem arise.        Laquita Troy MD  Hematology and Medical Oncology  Bone Marrow Transplant  [facility]             BMT Chart Routing      Follow up with physician 4 weeks. 1. see me in 4 weeks with labs: cbc,cmp [if i'm on service or out okay to push back 1 week -- she likes 1/2pm slots]   Follow up with [facility]    Provider visit type    Infusion scheduling note    Injection scheduling note    Labs    Imaging    Pharmacy appointment    Other referrals

## 2023-03-02 NOTE — ASSESSMENT & PLAN NOTE
- continue home regimen: MS contin and MSIR prn   Detail Level: Detailed General Sunscreen Counseling: I recommended a broad spectrum sunscreen with a SPF of 30 or higher.  I explained that SPF 30 sunscreens block approximately 97 percent of the sun's harmful rays.  Sunscreens should be applied at least 15 minutes prior to expected sun exposure and then every 2 hours after that as long as sun exposure continues. If swimming or exercising sunscreen should be reapplied every 45 minutes to an hour after getting wet or sweating.  One ounce, or the equivalent of a shot glass full of sunscreen, is adequate to protect the skin not covered by a bathing suit. I also recommended a lip balm with a sunscreen as well. Sun protective clothing can be used in lieu of sunscreen but must be worn the entire time you are exposed to the sun's rays.

## 2023-03-09 ENCOUNTER — TELEPHONE (OUTPATIENT)
Dept: HEMATOLOGY/ONCOLOGY | Facility: CLINIC | Age: 73
End: 2023-03-09
Payer: MEDICARE

## 2023-03-10 ENCOUNTER — TELEPHONE (OUTPATIENT)
Dept: HEMATOLOGY/ONCOLOGY | Facility: CLINIC | Age: 73
End: 2023-03-10
Payer: MEDICARE

## 2023-03-14 ENCOUNTER — LAB VISIT (OUTPATIENT)
Dept: LAB | Facility: HOSPITAL | Age: 73
End: 2023-03-14
Attending: INTERNAL MEDICINE
Payer: MEDICARE

## 2023-03-14 ENCOUNTER — OFFICE VISIT (OUTPATIENT)
Dept: HEMATOLOGY/ONCOLOGY | Facility: CLINIC | Age: 73
End: 2023-03-14
Payer: MEDICARE

## 2023-03-14 VITALS
DIASTOLIC BLOOD PRESSURE: 57 MMHG | TEMPERATURE: 99 F | SYSTOLIC BLOOD PRESSURE: 112 MMHG | HEIGHT: 64 IN | OXYGEN SATURATION: 93 % | HEART RATE: 81 BPM | BODY MASS INDEX: 35.95 KG/M2 | RESPIRATION RATE: 20 BRPM

## 2023-03-14 DIAGNOSIS — M19.90 ARTHRITIS: ICD-10-CM

## 2023-03-14 DIAGNOSIS — K21.9 LPRD (LARYNGOPHARYNGEAL REFLUX DISEASE): ICD-10-CM

## 2023-03-14 DIAGNOSIS — C92.02 AML (ACUTE MYELOID LEUKEMIA) IN RELAPSE: ICD-10-CM

## 2023-03-14 DIAGNOSIS — C93.01 ACUTE MONOCYTIC LEUKEMIA IN REMISSION: Primary | ICD-10-CM

## 2023-03-14 DIAGNOSIS — G89.3 CANCER RELATED PAIN: ICD-10-CM

## 2023-03-14 LAB
ALBUMIN SERPL BCP-MCNC: 3.4 G/DL (ref 3.5–5.2)
ALP SERPL-CCNC: 188 U/L (ref 55–135)
ALT SERPL W/O P-5'-P-CCNC: 122 U/L (ref 10–44)
ANION GAP SERPL CALC-SCNC: 10 MMOL/L (ref 8–16)
AST SERPL-CCNC: 82 U/L (ref 10–40)
BASOPHILS # BLD AUTO: 0.02 K/UL (ref 0–0.2)
BASOPHILS NFR BLD: 0.7 % (ref 0–1.9)
BILIRUB SERPL-MCNC: 0.7 MG/DL (ref 0.1–1)
BUN SERPL-MCNC: 9 MG/DL (ref 8–23)
CALCIUM SERPL-MCNC: 9.8 MG/DL (ref 8.7–10.5)
CHLORIDE SERPL-SCNC: 106 MMOL/L (ref 95–110)
CO2 SERPL-SCNC: 28 MMOL/L (ref 23–29)
CREAT SERPL-MCNC: 1.1 MG/DL (ref 0.5–1.4)
DIFFERENTIAL METHOD: ABNORMAL
EOSINOPHIL # BLD AUTO: 0 K/UL (ref 0–0.5)
EOSINOPHIL NFR BLD: 1.5 % (ref 0–8)
ERYTHROCYTE [DISTWIDTH] IN BLOOD BY AUTOMATED COUNT: 15.2 % (ref 11.5–14.5)
EST. GFR  (NO RACE VARIABLE): 53.7 ML/MIN/1.73 M^2
GLUCOSE SERPL-MCNC: 93 MG/DL (ref 70–110)
HCT VFR BLD AUTO: 39.4 % (ref 37–48.5)
HGB BLD-MCNC: 11.8 G/DL (ref 12–16)
IMM GRANULOCYTES # BLD AUTO: 0.11 K/UL (ref 0–0.04)
IMM GRANULOCYTES NFR BLD AUTO: 4 % (ref 0–0.5)
LYMPHOCYTES # BLD AUTO: 1.2 K/UL (ref 1–4.8)
LYMPHOCYTES NFR BLD: 43.4 % (ref 18–48)
MCH RBC QN AUTO: 31.8 PG (ref 27–31)
MCHC RBC AUTO-ENTMCNC: 29.9 G/DL (ref 32–36)
MCV RBC AUTO: 106 FL (ref 82–98)
MONOCYTES # BLD AUTO: 0.3 K/UL (ref 0.3–1)
MONOCYTES NFR BLD: 12.5 % (ref 4–15)
NEUTROPHILS # BLD AUTO: 1 K/UL (ref 1.8–7.7)
NEUTROPHILS NFR BLD: 37.9 % (ref 38–73)
NRBC BLD-RTO: 2 /100 WBC
PATH REV BLD -IMP: NORMAL
PLATELET # BLD AUTO: 47 K/UL (ref 150–450)
PMV BLD AUTO: 12.7 FL (ref 9.2–12.9)
POTASSIUM SERPL-SCNC: 4.4 MMOL/L (ref 3.5–5.1)
PROT SERPL-MCNC: 6 G/DL (ref 6–8.4)
RBC # BLD AUTO: 3.71 M/UL (ref 4–5.4)
SODIUM SERPL-SCNC: 144 MMOL/L (ref 136–145)
WBC # BLD AUTO: 2.72 K/UL (ref 3.9–12.7)

## 2023-03-14 PROCEDURE — 85025 COMPLETE CBC W/AUTO DIFF WBC: CPT | Performed by: INTERNAL MEDICINE

## 2023-03-14 PROCEDURE — 85060 BLOOD SMEAR INTERPRETATION: CPT | Mod: ,,, | Performed by: PATHOLOGY

## 2023-03-14 PROCEDURE — 99214 PR OFFICE/OUTPT VISIT, EST, LEVL IV, 30-39 MIN: ICD-10-PCS | Mod: S$PBB,,, | Performed by: PHYSICIAN ASSISTANT

## 2023-03-14 PROCEDURE — 99999 PR PBB SHADOW E&M-EST. PATIENT-LVL IV: ICD-10-PCS | Mod: PBBFAC,,, | Performed by: PHYSICIAN ASSISTANT

## 2023-03-14 PROCEDURE — 36415 COLL VENOUS BLD VENIPUNCTURE: CPT | Performed by: INTERNAL MEDICINE

## 2023-03-14 PROCEDURE — 99214 OFFICE O/P EST MOD 30 MIN: CPT | Mod: S$PBB,,, | Performed by: PHYSICIAN ASSISTANT

## 2023-03-14 PROCEDURE — 85060 PATHOLOGIST REVIEW: ICD-10-PCS | Mod: ,,, | Performed by: PATHOLOGY

## 2023-03-14 PROCEDURE — 99214 OFFICE O/P EST MOD 30 MIN: CPT | Mod: PBBFAC | Performed by: PHYSICIAN ASSISTANT

## 2023-03-14 PROCEDURE — 80053 COMPREHEN METABOLIC PANEL: CPT | Performed by: INTERNAL MEDICINE

## 2023-03-14 PROCEDURE — 99999 PR PBB SHADOW E&M-EST. PATIENT-LVL IV: CPT | Mod: PBBFAC,,, | Performed by: PHYSICIAN ASSISTANT

## 2023-03-14 RX ORDER — NALOXONE HYDROCHLORIDE 4 MG/.1ML
SPRAY NASAL
Qty: 1 EACH | Refills: 11 | Status: SHIPPED | OUTPATIENT
Start: 2023-03-14 | End: 2023-08-17

## 2023-03-14 RX ORDER — OXYCODONE HYDROCHLORIDE 10 MG/1
5 TABLET ORAL EVERY 6 HOURS PRN
Qty: 60 TABLET | Refills: 0 | Status: SHIPPED | OUTPATIENT
Start: 2023-03-14 | End: 2023-05-25 | Stop reason: SDUPTHER

## 2023-03-14 RX ORDER — OMEPRAZOLE 20 MG/1
20 CAPSULE, DELAYED RELEASE ORAL
Qty: 120 CAPSULE | Refills: 6 | Status: SHIPPED | OUTPATIENT
Start: 2023-03-14 | End: 2023-05-25 | Stop reason: SDUPTHER

## 2023-03-14 RX ORDER — PREDNISONE 20 MG/1
10 TABLET ORAL DAILY PRN
Qty: 30 TABLET | Refills: 3
Start: 2023-03-14 | End: 2023-08-22

## 2023-03-14 NOTE — PROGRESS NOTES
Hematology and Medical Oncology   Follow Up Note     02/06/2023    Primary Oncologic Diagnosis: AML, FLT 3 + and NPMN1+    History of Present Ilness:   Sabrina Badillo) is a pleasant 72 y.o.female presents to clinic following 2 induction therapies for AML. She was in the hospital roughly 50 days to receive 7+3 and then FLAG JENNIFER. Now on active observation.    Oncology History:   68 y.o. female admitted overnight for possible new AML. Came in from OSH with elevated WBC of 100.   05/25/2018: Pt is now on hydrea 2 grams BID, allopurinol 300 mg daily, and IVF. Pt may need rasburicase this weekend. She will undergo a BM bx and aspiration today. She is an induction candidate and will not be screened for research trials. She has anxiety for which she usually takes xanax, this was re-started.   05/26/2018 PICC placed. Reports she slept well last night. Anxiety improved with prn xanax. EF 65%, HIV and Hep panel negative. 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  6/12/2018: Day 15 of 7+3 induction, restaging BM bx done yesterday. On Midostaurin. Fever yesterday and BC with gram + cocci in clusters. On cefepime day 2 and will start Vanc today. Labial mucositis improving.   6/13/2018: Day 16 7+3. BC with staph aureus, identification pending. Surveillance blood cultures done today. Afebrile x 48 hours. On cefepime and Vanc. Labial mucositis resolved. No complaints of pain. Nausea controlled. No diarrhea. Primary complaint is fatigue.   6/14/20018: Day 17 of 7+3. Day 14 bone marrow results pending. BC with staph epidermis only sensitive to Vancomycin. Vanc day 3 and cefepime day 4. Vanc trough 12.2 last night. BP remains low but stable. Afebrile x 72 hours.   6/15/2018: Day 18 of 7+3. Day 14 bone marrow biopsy shows persistent disease with 15% blasts. Discussion with patient regarding treatment and she is deciding if she wants to proceed with re-induction vs outpatient maintenance. Temp of 100.4 overnight, unsustained.  Day 5 Cefepime and Day 4 of Vanc for staph epidermis. Repeat cultures NGTD. BP improved. Electrolytes (mag, phos, K and calcium) replaced aggressively this am and will repeat labs this afternoon. No complaints this am.   06/16/2018: Day 19 of 7+3. Day 2 of Flag-JENNIFER. Remains afebrile. Calcium remains low and have increased PO supplementation. Remains on vanc and aztreonam. Somewhat loopy feeling after receiving benadryl.   06/17/2018: Day 20 of 7+3. Day 3 of FLAG-JENNIFER. Multiple electrolyte abnormalities. New bilateral leg and feet swelling.   6/18/2018: Day 21 of 7+3 and Day 4 of FLAG JENNIFER. Tolerating well with some nausea controlled with Zofran. VSS, afebrile. ANC 1900 on Neupogen. Continues Vanc for staph epi bacteremia. Multiple electrolytes replaced today. Complains of edema to lower extremities, not improved after 20 of lasix yesterday. No sob or CP.   6/19/18: Day 22 of 7+3 and Day 5 of FLAG JENNIFER. VSS, afebrile, ANC 3900. Vanc trough elevated and dose adjusted this am. Lower extremity edema improved after 40 of lasix yesterday, net negative 300 cc I&O. Mild nausea, no abdominal pain or diarrhea. Poor appetite but no difficulty eating or taking pills.   6/20/18: Day 23 of 7+3 and Day 6 of FLAG JENNIFER. Patient complains of nausea and vomiting overnight and this am, especially when taking pills. Will add Zyprexa daily in addition to Zofran, compazine, and ativan PRN and will change meds to IV. 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 Afebrile.  No diarrhea, mouth sores or pain.  06/21/2018: Day 24 of 7+3 and Day 7 of FLAG JENNIFER. Nausea better controlled. Continued on Vanc.  6/22/2018: Day 25 of 7+3 and Day 8 of FLAG JENNIFER. Nausea improved some with Zyprexa but did have 1 episode of emesis overnight.continuing meds IV due to vomiting. Remains afebrile. ANC 0. Continues Vanc and Flagyl. Electrolytes replaced.   06/23/2018 : day 26 of 7+3 and Day 9 of FLAG  JENNIFER.  Nausea persists, worsened after taking pills so meds converted to IV.  Encouraged ambulation.  Continue with flagyl for leptotrichia goodfellowii.  RUQ US showed intrahepatic dilation, overall impression hepatic steatosis.  CBD 0.5cm.  No Gallbladder.    06/24/2018 : day 27 of 7+3 and Day 10 of FLAG JENNIFER.  Nausea persisting, able to tolerate some po pills.  Last day of flagyl (day 5).  Still pancytopenic. Appetite still low as po intake triggers nausea.  06/25/2018: day 28 of 7+3 and Day 11 of FLAG JENNIFER. Afebrile, ANC 0. Has completed Flagyl. Will decrease Vanc to 1000 mg q12, trough 19.9, will complete Vanc on 6/27. Liver enzymes stable on Midostaurin. VSS.   6/26/2018: day 29 of 7+3 and day 12 of FLAG JENNIFER. Liver enzymes improved and Midostaurin increased to full dose 50 mg bid. Nausea controlled, afebrile, VSS, NEON.  6/27/2018: day 30 of 7+3 and Day 13 of FLAG JENNIFER. Persistent nausea and emesis x 1 yesterday. Compazine changed to scheduled. Vanc ends today. Afebrile, VSS. Aggressive electrolyte replacement, low electrolytes possibly due to Midostaurin.   6/28/2018: day 31 of 7+3 and Day 14 of FLAG JENNIFER. Nausea improved with scheduled Compazine. Patient has agreed to start converting some IV meds to po. VSS, afebrile, electrolytes wnl today. Only complains of fatigue, new rash noted to upper back and chest and legs, papular rash mildly red.  6/29/2018: Day 32 of 7+3 and Day 15 of FLAG JENNIFER. Day 14 restaging bone marrow biopsy done at bedside today. Patient states nausea improved but she did have emesis last night after taking 9 pm pills. Rash improved. No mouth sores, diarrhea or pain.   7/2/2018: Day 35 of 7+3 and Day 18 of FLAG JENNIFER. Restaging BM bx results pending. Fluid overload over the weekend. Chest x-ray with pulmonary edema. Os sats down to 88%. Placed on O2 at 2 L NC, off O2 this am. Received lasix. Net negative 2.7 L today. 1 episode of nausea, no emesis. Reports anxiety relieved with PRN meds.  Electrolytes improved, afebrile, VSS.   7/3/2018: Day 36 of 7+3 and Day 19 of FLAG JENNIFER. Restaging Bm bx with GABRIEL. Cardiology consulted for abnormal echo, EF 30% with pulmonary HTN and severe L atrial enlargement. EKG with T wave abnormality, possible anterolateral ischemia and prolonged QTc of 530. Medications adjusted. Nausea controlled, no diarrhea. Electrolytes improved. On O2 as needed.   07/04/2018 : Day 37 of 7+3 and Day 20 of FLAG JENNIFER Diarrhea in AM, watery, no associated abdominal pain, nausea, or vomiting.    07/05/2018: Day 38 of 7+3 ane Day 21 of FLAG JENNIFER. No CP or SOB, cardiology following. On RA. All meds changed to po, denies nausea or vomiting and no diarrhea. VSS, afebrile.   7/6/2018: Day 22 of FLAG JENNIFER. Continues to tolerate po meds with no nausea. Afebrile. Awaiting count recovery for discharge.  7/7/2018-/8/2018: Day 40/41 of 7+3, Day 23/24 of FLAG JENNIFER.  Improving clinically.  Started on mag oxide per patient request.  Labs showing signs of ANC recovery.  7/9/2018: Day 25 FLAG JENNIFER, , continues Neupogen. Received platelets today. Afebrile, VSS. Tolerating all oral meds with no nausea or vomiting. Only complains of fatigue.   7/10/2018: Day 26 FLAG JENNIFER,  today. Had 2 episodes of vomiting and diarrhea last night. Feeling better. Afebrile, VSS.   7/11/2018: Day 27 FLAG JENNIFER,  today. No vomiting or diarrhea overnight and no nausea. Afebrile, VSS. Complains of back pain (bone pain) suspect due to count recovery. Relieved with oxy IR and Zyrtec started.  07/12/2018: Day 28 flag jennifer, engrafted today with ANC of 730. Back pain improved with zyrtec. Scheduled for IT chemo tomorrow at 1:30 pm and discharge home thereafter. Will likely need plt transfusion prior to IT chemo tomorrow   7/13/2018: Day 29 of FLAG JENNIFER. ANC up to 1300 today. Transfusing platelets today prior to LP for IT chemo. Patient continues to complain of bone pain, mostly to back and legs. No nausea, diarrhea, sob.  Afebrile and VSS.   --hospitalized 7/23-7/25 for C.diff diarrhea, neutropenic fever.  While inpatient she developed pink blisters on her palm that were concerning for relapse.  --palm biopsy on 7/26/18 for suspicion of leukemia cutis was also negative for sign of relapse  --bone marrow biopsy 7/31/18 was negative for signs of relapse   --Admitted on 8/14/18 for HiDAC#1  --Admitted on 9/11/18 for HiDAC#2  --Admitted on 10/16/18 for HiDAC#3  --Post consolidation bone marrow biopsy on 12/27/2018: AML FISH (BM) Interpretation: The result is within normal limits for the AML FISH panel. No evidence of residual disease.  --Bone Marrow Biopsy on 5/9/19 NO MORPHOLOGIC EVIDENCE OF RESIDUAL ACUTE MYELOID LEUKEMIA. RECOMMEND NGS STUDY TO RULE OUT RESIDUAL DISEASE. deletion 20q  --Bone Marrow Biopsy on 9/23/2020: CELLULARITY=40-50%, TRILINEAGE HEMATOPOIETIC ACTIVITY (M/E= 0.6:1). ERYTHROID HYPERPLASIA, DYSERYTHROPOIESIS, AND INCREASED BLAST (11%). SEE COMMENT.  FOCAL GRADE 1 RETICULAR FIBROSIS.  --Decitabine + Venetoclax: C1D1: 10/12/2020, C2D1: 11/9  --Bone marrow biopsy on 2/9/2021: VARIABLY CELLULAR MARROW WITH DYSERYTHROPOIESIS, DYSMEGAKARYOPOIESIS AND  MARKED MYELOID HYPOPLASIA.NO MORPHOLOGIC EVIDENCE OF RESIDUAL ACUTE MYELOID LEUKEMIA.    Interval History:   Mrs. Mac is a 72 y.o. patient who presents with her  for ongoing lab and symptom check for her AML. CMP today shows transminitis, which has been noted in past. A couple of weeks ago she pulled her L rib cage/muscle, went to local ED, was told this was a muscular strain after negative work up. Has been taking oxycodone and tylenol for this, suspect that similar to previous, transaminitis is related to analgesics. She has no other symptoms/B symptoms/no bleeding/petechiae.     Will hold tylenol. Repeat labs next week. F/u with Dr. Troy in 4 weeks.       Past Medical History:   Past Medical History:   Diagnosis Date    Cancer 03/2018    AML    CHF (congestive  heart failure)     Diarrhea 9/14/2018    Encounter for blood transfusion        Current Medications:   Current Outpatient Medications   Medication Sig    acetaminophen (TYLENOL) 500 MG tablet Take 500 mg by mouth every 6 (six) hours as needed.    ALPRAZolam (XANAX) 2 MG Tab TAKE ONE TABLET BY MOUTH NIGHTLY AS NEEDED    EScitalopram oxalate (LEXAPRO) 20 MG tablet TAKE ONE TABLET BY MOUTH EACH EVENING    methocarbamoL (ROBAXIN) 500 MG Tab Take 1 tablet (500 mg total) by mouth 4 (four) times daily.    metoprolol succinate (TOPROL-XL) 25 MG 24 hr tablet TAKE ONE TABLET BY MOUTH DAILY    oxyCODONE (ROXICODONE) 10 mg Tab immediate release tablet Take 1 tablet (10 mg total) by mouth every 6 (six) hours as needed (pain).    polyethylene glycol (GLYCOLAX) 17 gram PwPk Take 17 g by mouth once daily.    predniSONE (DELTASONE) 20 MG tablet Take 1 tablet (20 mg total) by mouth daily as needed (as needed).    lactulose (CHRONULAC) 20 gram/30 mL Soln Take 23 mLs (15 g total) by mouth 3 (three) times daily as needed. (Patient not taking: Reported on 5/6/2021)    naloxone (NARCAN) 4 mg/actuation Spry 4mg by nasal route as needed for opioid overdose; may repeat every 2-3 minutes in alternating nostrils until medical help arrives. Call 911    omeprazole (PRILOSEC) 20 MG capsule Take 1 capsule (20 mg total) by mouth 2 (two) times daily before meals.    ondansetron (ZOFRAN) 8 MG tablet Take 1 tablet (8 mg total) by mouth every 12 (twelve) hours as needed for Nausea. (Patient not taking: Reported on 5/20/2022)    oxyCODONE (ROXICODONE) 10 mg Tab immediate release tablet Take 0.5 tablets (5 mg total) by mouth every 6 (six) hours as needed (pain).    senna-docusate 8.6-50 mg (PERICOLACE) 8.6-50 mg per tablet Take 1 tablet by mouth once daily. (Patient not taking: Reported on 5/6/2021)     No current facility-administered medications for this visit.     ALLERGIES:   Review of patient's allergies indicates:   Allergen Reactions     Methotrexate analogues      Elevated Liver Enzyme    Bactrim [sulfamethoxazole-trimethoprim] Nausea And Vomiting and Rash       Review of Systems:     Review of Systems   Constitutional: Positive for fatigue (improving). Negative for appetite change, chills, diaphoresis, fever and unexpected weight change.   HENT:   Negative for hearing loss, mouth sores, nosebleeds, sore throat, trouble swallowing and voice change.    Eyes: Negative for eye problems and icterus.   Respiratory: Negative for chest tightness, cough, hemoptysis, shortness of breath and wheezing.    Cardiovascular: Negative for chest pain, leg swelling and palpitations.   Gastrointestinal: Negative for abdominal distention, abdominal pain, blood in stool, diarrhea, nausea and vomiting.   Endocrine: Negative for hot flashes.   Genitourinary: Negative for bladder incontinence, difficulty urinating, dysuria and hematuria.    Musculoskeletal: Positive for arthralgias and myalgias. Negative for back pain, flank pain, gait problem, neck pain and neck stiffness.   Skin: Negative for itching, rash and wound.   Neurological: Negative for dizziness, extremity weakness, gait problem, headaches, numbness, seizures and speech difficulty.   Hematological: Negative for adenopathy. + Bruise. Does not  bleed easily.   Psychiatric/Behavioral: Negative for confusion, depression and sleep disturbance. The patient is not nervous/anxious.        Physical Exam:     Vitals:    03/14/23 1217   BP: (!) 112/57   Pulse: 81   Resp: 20   Temp: 98.5 °F (36.9 °C)         Constitutional:       Appearance: She is well-developed.   HENT:      Head: Normocephalic and atraumatic.      Right Ear: External ear normal.      Left Ear: External ear normal.      Mouth/Throat:      Pharynx: No posterior oropharyngeal erythema.   Eyes:      Conjunctiva/sclera: Conjunctivae normal.   Cardiovascular:      Rate and Rhythm: Normal rate and regular rhythm.      Heart sounds: Normal heart sounds. No  murmur heard.     Pulmonary:      Effort: Pulmonary effort is normal. No respiratory distress.      Breath sounds: Normal breath sounds. No stridor. No wheezing or rales.   Abdominal:      General: Bowel sounds are normal. There is no distension.      Palpations: Abdomen is soft.      Tenderness: There is no abdominal tenderness.   Musculoskeletal:         General: Normal range of motion.      Cervical back: Normal range of motion.      Right lower leg: No edema.      Left lower leg: No edema.   Skin:     General: Skin is warm and dry.      Findings: No rash.   Neurological:      Mental Status: She is alert and oriented to person, place, and time.   Psychiatric:         Behavior: Behavior normal.         Thought Content: Thought content normal.         Judgment: Judgment normal.       ECOG Performance Status: (foot note - ECOG PS provided by Eastern Cooperative Oncology Group) 2 - Symptomatic, <50% confined to bed    Karnofsky Performance Score:  80%- Normal Activity with Effort: Some Symptoms of Disease    Labs:   Lab Results   Component Value Date    WBC 2.72 (L) 03/14/2023    HGB 11.8 (L) 03/14/2023    HCT 39.4 03/14/2023    PLT 47 (L) 03/14/2023     (H) 03/14/2023    AST 82 (H) 03/14/2023     03/14/2023    K 4.4 03/14/2023     03/14/2023    CREATININE 1.1 03/14/2023    BUN 9 03/14/2023    CO2 28 03/14/2023    TSH 1.913 10/17/2019    INR 0.9 01/31/2021    HGBA1C 6.0 (H) 01/31/2021       Imaging: previous imaging has been reviewed    Assessment and Plan:     Mrs. Mac is a 72 year old female with AML in second remission.     AML  -- Admitted from [facility] early AM with WBC around 100s, for suspected new non-M3 AML  --lo res HLA typing on 5/26/18 and hi res on 5/27/18 done in anticipation of possible need for future transplant   --Pt with two daughters, two full sisters (in their mid 60s, one with brain aneurysm hx, other one without any medical issues), and one full brother  (59 y/o healthy).  --NPM1 +, CEBPA (-), FLT3 (+).  On Midostaurin 50 mg PO BID until Day 14 (held starting 6/8 to 6/11). Stopped when FLAG JENNIFER re-induction started. Restarted Midostaurin at 25 mg bid on day 8 of FLAG JENNIFER induction (6/22), increase to full dose 50 mg bid (6/26) as improvement in liver enzymes. Stopped 7/3/18 due to abnormal cardiac echo.  --day 14 bone marrow biopsy completed 6/11 showing persistent disease with 15% CD 34 positive blasts  --Day 60 of FLAG-JENNIFER, tolerating without difficulty except nausea/vomiting  --day 14 restaging bone marrow biopsy done 6/29 without complication, results with no evidence of AML, FLT 3 negative, will need repeat marrow at count recovery outpatient   --recovery marrow showed no evidence of disease  --HiDAC #1 on 8/14/18, HiDAC#2 on 9/11/18  --Cycle 3 delayed 1 week due to pancytopenia. Started on 10/16/18  --recovery bone marrow showed no signs of residual leukemia  --Bone marrow biopsy done last week reveals early relapse. 11% blasts. We discussed the possibility of re-induction, but she is adamantly against. She does not want to be admitted to the hospital.  --Plan for single agent Gilteritinib 120mg has changed based on next gen sequencing that reports FLT-3 negative at this time  --Completed 4 cycles of Decitabine + Venetoclax  --Most recent marrow shows no evidence of disease  --Will continue with supportive care  --Continue to follow monthly labs  --Pancytopenic today. ANC downtrending, with slight downward trend in Hgb/platelet count. No b symptoms. No other concerns on differential.  Will have path review smear to be thorough. Repeating labs next week, to include LDH and again in 1 month as usual.     Abnormal Liver Function Tests  --ALT and AST and Alk phos have again increased substantially since re-starting midostaurin.   --midostaurin started day 8 at 25 mg bid, monitoring liver enzymes closely and improved. Increasing Midostaurin to 50 mg bid 6/26.  Stopped Midostaurin (7/3) due to new diagnosis of systolic heart failure EF 35%.  --6/22/18 liver US showing mild intrahepatic dilation, otherwise impression of hepatic steatosis.  Will hold off of MRCP at this time.    --permanently discontinued midostaurin   --LFTs elevated today, normal bili. suspect related to recent increased tylenol/analgesic use d/t pulled muscle, similar to transient elevations in past. Instructed to hold tylenol. Repeat CMP next week.     Chemotherapy induced cardiomyopathy  --cardiology follow-up as outpatient in 3 months  --repeat 2D echo on 6/15 with preserved EF of 60%.  However 7/2/18 echo with reduced EF 30-35%  --Echo on 8/15/18 was within normal range EF of 60-65%    Rheumatologic issues  --Previously on steroids and methotrexate  --Now on single agent low dose steroids [she self adjusts this medication]  --[place] [facility] [place]    GERD  --Omeprazole 20mg BID, refill sent      30 minutes were spent face to face with the patient and her family to discuss the disease, natural history, treatment options and survival statistics. I have provided the patient with an opportunity to ask questions and have all questions answered to her satisfaction.      she will return to the clinic in 4 weeks, but knows to call in the interim if symptoms change or should a problem arise.        Yoly Castellon PA-C  Malignant Hematology & Bone Marrow Transplant

## 2023-03-15 LAB — PATH REV BLD -IMP: NORMAL

## 2023-03-21 ENCOUNTER — LAB VISIT (OUTPATIENT)
Dept: FAMILY MEDICINE | Facility: CLINIC | Age: 73
End: 2023-03-21
Payer: MEDICARE

## 2023-03-21 DIAGNOSIS — C92.02 ACUTE MYELOID LEUKEMIA IN RELAPSE: ICD-10-CM

## 2023-03-21 DIAGNOSIS — C93.01 ACUTE MONOCYTIC LEUKEMIA IN REMISSION: ICD-10-CM

## 2023-03-21 PROCEDURE — 36415 COLL VENOUS BLD VENIPUNCTURE: CPT | Mod: S$GLB,,, | Performed by: INTERNAL MEDICINE

## 2023-03-21 PROCEDURE — 83615 LACTATE (LD) (LDH) ENZYME: CPT | Performed by: PHYSICIAN ASSISTANT

## 2023-03-21 PROCEDURE — 36415 PR COLLECTION VENOUS BLOOD,VENIPUNCTURE: ICD-10-PCS | Mod: S$GLB,,, | Performed by: INTERNAL MEDICINE

## 2023-03-21 PROCEDURE — 85025 COMPLETE CBC W/AUTO DIFF WBC: CPT | Performed by: INTERNAL MEDICINE

## 2023-03-21 PROCEDURE — 80053 COMPREHEN METABOLIC PANEL: CPT | Performed by: INTERNAL MEDICINE

## 2023-03-22 LAB
ALBUMIN SERPL BCP-MCNC: 3.6 G/DL (ref 3.5–5.2)
ALP SERPL-CCNC: 144 U/L (ref 55–135)
ALT SERPL W/O P-5'-P-CCNC: 54 U/L (ref 10–44)
ANION GAP SERPL CALC-SCNC: 12 MMOL/L (ref 8–16)
AST SERPL-CCNC: 33 U/L (ref 10–40)
BASOPHILS # BLD AUTO: 0.03 K/UL (ref 0–0.2)
BASOPHILS NFR BLD: 0.7 % (ref 0–1.9)
BILIRUB SERPL-MCNC: 0.6 MG/DL (ref 0.1–1)
BUN SERPL-MCNC: 14 MG/DL (ref 8–23)
CALCIUM SERPL-MCNC: 9.9 MG/DL (ref 8.7–10.5)
CHLORIDE SERPL-SCNC: 102 MMOL/L (ref 95–110)
CO2 SERPL-SCNC: 26 MMOL/L (ref 23–29)
CREAT SERPL-MCNC: 1 MG/DL (ref 0.5–1.4)
DIFFERENTIAL METHOD: ABNORMAL
EOSINOPHIL # BLD AUTO: 0 K/UL (ref 0–0.5)
EOSINOPHIL NFR BLD: 0.5 % (ref 0–8)
ERYTHROCYTE [DISTWIDTH] IN BLOOD BY AUTOMATED COUNT: 15.3 % (ref 11.5–14.5)
EST. GFR  (NO RACE VARIABLE): >60 ML/MIN/1.73 M^2
GLUCOSE SERPL-MCNC: 94 MG/DL (ref 70–110)
HCT VFR BLD AUTO: 40.2 % (ref 37–48.5)
HGB BLD-MCNC: 12.8 G/DL (ref 12–16)
IMM GRANULOCYTES # BLD AUTO: 0.17 K/UL (ref 0–0.04)
IMM GRANULOCYTES NFR BLD AUTO: 4.2 % (ref 0–0.5)
LDH SERPL L TO P-CCNC: 224 U/L (ref 110–260)
LYMPHOCYTES # BLD AUTO: 1.5 K/UL (ref 1–4.8)
LYMPHOCYTES NFR BLD: 37.5 % (ref 18–48)
MCH RBC QN AUTO: 33.2 PG (ref 27–31)
MCHC RBC AUTO-ENTMCNC: 31.8 G/DL (ref 32–36)
MCV RBC AUTO: 104 FL (ref 82–98)
MONOCYTES # BLD AUTO: 0.5 K/UL (ref 0.3–1)
MONOCYTES NFR BLD: 11 % (ref 4–15)
NEUTROPHILS # BLD AUTO: 1.9 K/UL (ref 1.8–7.7)
NEUTROPHILS NFR BLD: 46.1 % (ref 38–73)
NRBC BLD-RTO: 1 /100 WBC
PLATELET # BLD AUTO: 54 K/UL (ref 150–450)
PMV BLD AUTO: 13.5 FL (ref 9.2–12.9)
POTASSIUM SERPL-SCNC: 4.3 MMOL/L (ref 3.5–5.1)
PROT SERPL-MCNC: 5.9 G/DL (ref 6–8.4)
RBC # BLD AUTO: 3.86 M/UL (ref 4–5.4)
SODIUM SERPL-SCNC: 140 MMOL/L (ref 136–145)
WBC # BLD AUTO: 4.08 K/UL (ref 3.9–12.7)

## 2023-04-06 ENCOUNTER — PATIENT MESSAGE (OUTPATIENT)
Dept: HEMATOLOGY/ONCOLOGY | Facility: CLINIC | Age: 73
End: 2023-04-06
Payer: MEDICARE

## 2023-04-17 ENCOUNTER — LAB VISIT (OUTPATIENT)
Dept: LAB | Facility: HOSPITAL | Age: 73
End: 2023-04-17
Payer: MEDICARE

## 2023-04-17 ENCOUNTER — OFFICE VISIT (OUTPATIENT)
Dept: HEMATOLOGY/ONCOLOGY | Facility: CLINIC | Age: 73
End: 2023-04-17
Payer: MEDICARE

## 2023-04-17 VITALS
SYSTOLIC BLOOD PRESSURE: 115 MMHG | RESPIRATION RATE: 16 BRPM | BODY MASS INDEX: 35.68 KG/M2 | OXYGEN SATURATION: 96 % | TEMPERATURE: 98 F | HEIGHT: 64 IN | HEART RATE: 86 BPM | WEIGHT: 209 LBS | DIASTOLIC BLOOD PRESSURE: 58 MMHG

## 2023-04-17 DIAGNOSIS — C92.02 ACUTE MYELOID LEUKEMIA IN RELAPSE: ICD-10-CM

## 2023-04-17 DIAGNOSIS — F41.9 ANXIETY: ICD-10-CM

## 2023-04-17 DIAGNOSIS — M35.1 MCTD (MIXED CONNECTIVE TISSUE DISEASE): ICD-10-CM

## 2023-04-17 DIAGNOSIS — C92.02 ACUTE MYELOID LEUKEMIA IN RELAPSE: Primary | ICD-10-CM

## 2023-04-17 LAB
ALBUMIN SERPL BCP-MCNC: 3.7 G/DL (ref 3.5–5.2)
ALP SERPL-CCNC: 97 U/L (ref 55–135)
ALT SERPL W/O P-5'-P-CCNC: 44 U/L (ref 10–44)
ANION GAP SERPL CALC-SCNC: 12 MMOL/L (ref 8–16)
ANISOCYTOSIS BLD QL SMEAR: SLIGHT
AST SERPL-CCNC: 36 U/L (ref 10–40)
BASOPHILS NFR BLD: 0 % (ref 0–1.9)
BILIRUB SERPL-MCNC: 0.6 MG/DL (ref 0.1–1)
BUN SERPL-MCNC: 15 MG/DL (ref 8–23)
CALCIUM SERPL-MCNC: 10.1 MG/DL (ref 8.7–10.5)
CHLORIDE SERPL-SCNC: 108 MMOL/L (ref 95–110)
CO2 SERPL-SCNC: 25 MMOL/L (ref 23–29)
CREAT SERPL-MCNC: 1.1 MG/DL (ref 0.5–1.4)
DIFFERENTIAL METHOD: ABNORMAL
EOSINOPHIL NFR BLD: 0 % (ref 0–8)
ERYTHROCYTE [DISTWIDTH] IN BLOOD BY AUTOMATED COUNT: 15.1 % (ref 11.5–14.5)
EST. GFR  (NO RACE VARIABLE): 53.7 ML/MIN/1.73 M^2
GLUCOSE SERPL-MCNC: 108 MG/DL (ref 70–110)
HCT VFR BLD AUTO: 38.5 % (ref 37–48.5)
HGB BLD-MCNC: 11.5 G/DL (ref 12–16)
IMM GRANULOCYTES # BLD AUTO: ABNORMAL K/UL (ref 0–0.04)
IMM GRANULOCYTES NFR BLD AUTO: ABNORMAL % (ref 0–0.5)
LYMPHOCYTES NFR BLD: 18 % (ref 18–48)
MCH RBC QN AUTO: 31.5 PG (ref 27–31)
MCHC RBC AUTO-ENTMCNC: 29.9 G/DL (ref 32–36)
MCV RBC AUTO: 106 FL (ref 82–98)
MONOCYTES NFR BLD: 9 % (ref 4–15)
MYELOCYTES NFR BLD MANUAL: 2 %
NEUTROPHILS NFR BLD: 71 % (ref 38–73)
NRBC BLD-RTO: 1 /100 WBC
PLATELET # BLD AUTO: 56 K/UL (ref 150–450)
PLATELET BLD QL SMEAR: ABNORMAL
PMV BLD AUTO: 12.2 FL (ref 9.2–12.9)
POTASSIUM SERPL-SCNC: 4.9 MMOL/L (ref 3.5–5.1)
PROT SERPL-MCNC: 6.4 G/DL (ref 6–8.4)
RBC # BLD AUTO: 3.65 M/UL (ref 4–5.4)
SMUDGE CELLS BLD QL SMEAR: PRESENT
SODIUM SERPL-SCNC: 145 MMOL/L (ref 136–145)
WBC # BLD AUTO: 3.32 K/UL (ref 3.9–12.7)

## 2023-04-17 PROCEDURE — 85027 COMPLETE CBC AUTOMATED: CPT | Performed by: INTERNAL MEDICINE

## 2023-04-17 PROCEDURE — 99999 PR PBB SHADOW E&M-EST. PATIENT-LVL III: ICD-10-PCS | Mod: PBBFAC,,, | Performed by: INTERNAL MEDICINE

## 2023-04-17 PROCEDURE — 80053 COMPREHEN METABOLIC PANEL: CPT | Performed by: INTERNAL MEDICINE

## 2023-04-17 PROCEDURE — 36415 COLL VENOUS BLD VENIPUNCTURE: CPT | Performed by: INTERNAL MEDICINE

## 2023-04-17 PROCEDURE — 85007 BL SMEAR W/DIFF WBC COUNT: CPT | Performed by: INTERNAL MEDICINE

## 2023-04-17 PROCEDURE — 99215 PR OFFICE/OUTPT VISIT, EST, LEVL V, 40-54 MIN: ICD-10-PCS | Mod: S$PBB,,, | Performed by: INTERNAL MEDICINE

## 2023-04-17 PROCEDURE — 99999 PR PBB SHADOW E&M-EST. PATIENT-LVL III: CPT | Mod: PBBFAC,,, | Performed by: INTERNAL MEDICINE

## 2023-04-17 PROCEDURE — 99215 OFFICE O/P EST HI 40 MIN: CPT | Mod: S$PBB,,, | Performed by: INTERNAL MEDICINE

## 2023-04-17 PROCEDURE — 99213 OFFICE O/P EST LOW 20 MIN: CPT | Mod: PBBFAC | Performed by: INTERNAL MEDICINE

## 2023-04-17 RX ORDER — ALPRAZOLAM 2 MG/1
TABLET ORAL
Qty: 60 TABLET | Refills: 0 | Status: SHIPPED | OUTPATIENT
Start: 2023-04-17 | End: 2023-09-05 | Stop reason: SDUPTHER

## 2023-04-17 RX ORDER — CIPROFLOXACIN 500 MG/1
500 TABLET ORAL 2 TIMES DAILY
COMMUNITY
Start: 2023-04-10 | End: 2023-08-17

## 2023-04-17 RX ORDER — ESCITALOPRAM OXALATE 20 MG/1
20 TABLET ORAL NIGHTLY
Qty: 30 TABLET | Refills: 5 | Status: SHIPPED | OUTPATIENT
Start: 2023-04-17 | End: 2023-11-10

## 2023-04-17 NOTE — PROGRESS NOTES
Hematology and Medical Oncology   Follow Up Note     04/17/2023    Primary Oncologic Diagnosis: AML, FLT 3 + and NPMN1+    History of Present Ilness:   Sabrina Badillo) is a pleasant 72 y.o.female presents to clinic following 2 induction therapies for AML. She was in the hospital roughly 50 days to receive 7+3 and then FLAG JENNIFER. Now on active observation.    Oncology History:   68 y.o. female admitted overnight for possible new AML. Came in from OSH with elevated WBC of 100.   05/25/2018: Pt is now on hydrea 2 grams BID, allopurinol 300 mg daily, and IVF. Pt may need rasburicase this weekend. She will undergo a BM bx and aspiration today. She is an induction candidate and will not be screened for research trials. She has anxiety for which she usually takes xanax, this was re-started.   05/26/2018 PICC placed. Reports she slept well last night. Anxiety improved with prn xanax. EF 65%, HIV and Hep panel negative. 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  6/12/2018: Day 15 of 7+3 induction, restaging BM bx done yesterday. On Midostaurin. Fever yesterday and BC with gram + cocci in clusters. On cefepime day 2 and will start Vanc today. Labial mucositis improving.   6/13/2018: Day 16 7+3. BC with staph aureus, identification pending. Surveillance blood cultures done today. Afebrile x 48 hours. On cefepime and Vanc. Labial mucositis resolved. No complaints of pain. Nausea controlled. No diarrhea. Primary complaint is fatigue.   6/14/20018: Day 17 of 7+3. Day 14 bone marrow results pending. BC with staph epidermis only sensitive to Vancomycin. Vanc day 3 and cefepime day 4. Vanc trough 12.2 last night. BP remains low but stable. Afebrile x 72 hours.   6/15/2018: Day 18 of 7+3. Day 14 bone marrow biopsy shows persistent disease with 15% blasts. Discussion with patient regarding treatment and she is deciding if she wants to proceed with re-induction vs outpatient maintenance. Temp of 100.4 overnight, unsustained.  Day 5 Cefepime and Day 4 of Vanc for staph epidermis. Repeat cultures NGTD. BP improved. Electrolytes (mag, phos, K and calcium) replaced aggressively this am and will repeat labs this afternoon. No complaints this am.   06/16/2018: Day 19 of 7+3. Day 2 of Flag-JENNIFER. Remains afebrile. Calcium remains low and have increased PO supplementation. Remains on vanc and aztreonam. Somewhat loopy feeling after receiving benadryl.   06/17/2018: Day 20 of 7+3. Day 3 of FLAG-JENNIFER. Multiple electrolyte abnormalities. New bilateral leg and feet swelling.   6/18/2018: Day 21 of 7+3 and Day 4 of FLAG JENNIFER. Tolerating well with some nausea controlled with Zofran. VSS, afebrile. ANC 1900 on Neupogen. Continues Vanc for staph epi bacteremia. Multiple electrolytes replaced today. Complains of edema to lower extremities, not improved after 20 of lasix yesterday. No sob or CP.   6/19/18: Day 22 of 7+3 and Day 5 of FLAG JENNIFER. VSS, afebrile, ANC 3900. Vanc trough elevated and dose adjusted this am. Lower extremity edema improved after 40 of lasix yesterday, net negative 300 cc I&O. Mild nausea, no abdominal pain or diarrhea. Poor appetite but no difficulty eating or taking pills.   6/20/18: Day 23 of 7+3 and Day 6 of FLAG JENNIFER. Patient complains of nausea and vomiting overnight and this am, especially when taking pills. Will add Zyprexa daily in addition to Zofran, compazine, and ativan PRN and will change meds to IV. 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 Afebrile.  No diarrhea, mouth sores or pain.  06/21/2018: Day 24 of 7+3 and Day 7 of FLAG JENNIFER. Nausea better controlled. Continued on Vanc.  6/22/2018: Day 25 of 7+3 and Day 8 of FLAG JENNIFER. Nausea improved some with Zyprexa but did have 1 episode of emesis overnight.continuing meds IV due to vomiting. Remains afebrile. ANC 0. Continues Vanc and Flagyl. Electrolytes replaced.   06/23/2018 : day 26 of 7+3 and Day 9 of FLAG  JENNIFER.  Nausea persists, worsened after taking pills so meds converted to IV.  Encouraged ambulation.  Continue with flagyl for leptotrichia goodfellowii.  RUQ US showed intrahepatic dilation, overall impression hepatic steatosis.  CBD 0.5cm.  No Gallbladder.    06/24/2018 : day 27 of 7+3 and Day 10 of FLAG JENNIFER.  Nausea persisting, able to tolerate some po pills.  Last day of flagyl (day 5).  Still pancytopenic. Appetite still low as po intake triggers nausea.  06/25/2018: day 28 of 7+3 and Day 11 of FLAG JENNIFER. Afebrile, ANC 0. Has completed Flagyl. Will decrease Vanc to 1000 mg q12, trough 19.9, will complete Vanc on 6/27. Liver enzymes stable on Midostaurin. VSS.   6/26/2018: day 29 of 7+3 and day 12 of FLAG JENNIFER. Liver enzymes improved and Midostaurin increased to full dose 50 mg bid. Nausea controlled, afebrile, VSS, NEON.  6/27/2018: day 30 of 7+3 and Day 13 of FLAG JENNIFER. Persistent nausea and emesis x 1 yesterday. Compazine changed to scheduled. Vanc ends today. Afebrile, VSS. Aggressive electrolyte replacement, low electrolytes possibly due to Midostaurin.   6/28/2018: day 31 of 7+3 and Day 14 of FLAG JENNIFER. Nausea improved with scheduled Compazine. Patient has agreed to start converting some IV meds to po. VSS, afebrile, electrolytes wnl today. Only complains of fatigue, new rash noted to upper back and chest and legs, papular rash mildly red.  6/29/2018: Day 32 of 7+3 and Day 15 of FLAG JENNIFER. Day 14 restaging bone marrow biopsy done at bedside today. Patient states nausea improved but she did have emesis last night after taking 9 pm pills. Rash improved. No mouth sores, diarrhea or pain.   7/2/2018: Day 35 of 7+3 and Day 18 of FLAG JENNIFER. Restaging BM bx results pending. Fluid overload over the weekend. Chest x-ray with pulmonary edema. Os sats down to 88%. Placed on O2 at 2 L NC, off O2 this am. Received lasix. Net negative 2.7 L today. 1 episode of nausea, no emesis. Reports anxiety relieved with PRN meds.  Electrolytes improved, afebrile, VSS.   7/3/2018: Day 36 of 7+3 and Day 19 of FLAG JENNIFER. Restaging Bm bx with GABRIEL. Cardiology consulted for abnormal echo, EF 30% with pulmonary HTN and severe L atrial enlargement. EKG with T wave abnormality, possible anterolateral ischemia and prolonged QTc of 530. Medications adjusted. Nausea controlled, no diarrhea. Electrolytes improved. On O2 as needed.   07/04/2018 : Day 37 of 7+3 and Day 20 of FLAG JENNIFER Diarrhea in AM, watery, no associated abdominal pain, nausea, or vomiting.    07/05/2018: Day 38 of 7+3 ane Day 21 of FLAG JENNIFER. No CP or SOB, cardiology following. On RA. All meds changed to po, denies nausea or vomiting and no diarrhea. VSS, afebrile.   7/6/2018: Day 22 of FLAG JENNIFER. Continues to tolerate po meds with no nausea. Afebrile. Awaiting count recovery for discharge.  7/7/2018-/8/2018: Day 40/41 of 7+3, Day 23/24 of FLAG JENNIFER.  Improving clinically.  Started on mag oxide per patient request.  Labs showing signs of ANC recovery.  7/9/2018: Day 25 FLAG JENNIFER, , continues Neupogen. Received platelets today. Afebrile, VSS. Tolerating all oral meds with no nausea or vomiting. Only complains of fatigue.   7/10/2018: Day 26 FLAG JENNIFER,  today. Had 2 episodes of vomiting and diarrhea last night. Feeling better. Afebrile, VSS.   7/11/2018: Day 27 FLAG JENNIFER,  today. No vomiting or diarrhea overnight and no nausea. Afebrile, VSS. Complains of back pain (bone pain) suspect due to count recovery. Relieved with oxy IR and Zyrtec started.  07/12/2018: Day 28 flag jennifer, engrafted today with ANC of 730. Back pain improved with zyrtec. Scheduled for IT chemo tomorrow at 1:30 pm and discharge home thereafter. Will likely need plt transfusion prior to IT chemo tomorrow   7/13/2018: Day 29 of FLAG JENNIFER. ANC up to 1300 today. Transfusing platelets today prior to LP for IT chemo. Patient continues to complain of bone pain, mostly to back and legs. No nausea, diarrhea, sob.  Afebrile and VSS.   --hospitalized 7/23-7/25 for C.diff diarrhea, neutropenic fever.  While inpatient she developed pink blisters on her palm that were concerning for relapse.  --palm biopsy on 7/26/18 for suspicion of leukemia cutis was also negative for sign of relapse  --bone marrow biopsy 7/31/18 was negative for signs of relapse   --Admitted on 8/14/18 for HiDAC#1  --Admitted on 9/11/18 for HiDAC#2  --Admitted on 10/16/18 for HiDAC#3  --Post consolidation bone marrow biopsy on 12/27/2018: AML FISH (BM) Interpretation: The result is within normal limits for the AML FISH panel. No evidence of residual disease.  --Bone Marrow Biopsy on 5/9/19 NO MORPHOLOGIC EVIDENCE OF RESIDUAL ACUTE MYELOID LEUKEMIA. RECOMMEND NGS STUDY TO RULE OUT RESIDUAL DISEASE. deletion 20q  --Bone Marrow Biopsy on 9/23/2020: CELLULARITY=40-50%, TRILINEAGE HEMATOPOIETIC ACTIVITY (M/E= 0.6:1). ERYTHROID HYPERPLASIA, DYSERYTHROPOIESIS, AND INCREASED BLAST (11%). SEE COMMENT.  FOCAL GRADE 1 RETICULAR FIBROSIS.  --Decitabine + Venetoclax: C1D1: 10/12/2020, C2D1: 11/9  --Bone marrow biopsy on 2/9/2021: VARIABLY CELLULAR MARROW WITH DYSERYTHROPOIESIS, DYSMEGAKARYOPOIESIS AND  MARKED MYELOID HYPOPLASIA.NO MORPHOLOGIC EVIDENCE OF RESIDUAL ACUTE MYELOID LEUKEMIA.    Interval History:   Mrs. Mac is a 72 y.o. patient who presents with her sister for ongoing lab and symptom check for her AML. Currently on prednisone 10mg, felt the change in energy and inflamation with the drop in dose.     Hospitalized locally over Easter vacation for a UTI leading to confusion/altered mental status. Now resolved.     After spending most of the day sitting on her rollator as a chair [watching a track meet] she was experiencing muscle spasms on right thigh and left ribs. Oxy stops pain in 32 minutes.       Past Medical History:   Past Medical History:   Diagnosis Date    Cancer 03/2018    AML    CHF (congestive heart failure)     Diarrhea 9/14/2018    Encounter for  blood transfusion        Current Medications:   Current Outpatient Medications   Medication Sig    acetaminophen (TYLENOL) 500 MG tablet Take 500 mg by mouth every 6 (six) hours as needed.    ALPRAZolam (XANAX) 2 MG Tab TAKE ONE TABLET BY MOUTH NIGHTLY AS NEEDED    ciprofloxacin HCl (CIPRO) 500 MG tablet Take 500 mg by mouth 2 (two) times daily.    EScitalopram oxalate (LEXAPRO) 20 MG tablet TAKE ONE TABLET BY MOUTH EACH EVENING    methocarbamoL (ROBAXIN) 500 MG Tab Take 1 tablet (500 mg total) by mouth 4 (four) times daily.    metoprolol succinate (TOPROL-XL) 25 MG 24 hr tablet TAKE ONE TABLET BY MOUTH DAILY    naloxone (NARCAN) 4 mg/actuation Spry 4mg by nasal route as needed for opioid overdose; may repeat every 2-3 minutes in alternating nostrils until medical help arrives. Call 911    omeprazole (PRILOSEC) 20 MG capsule Take 1 capsule (20 mg total) by mouth 2 (two) times daily before meals.    oxyCODONE (ROXICODONE) 10 mg Tab immediate release tablet Take 1 tablet (10 mg total) by mouth every 6 (six) hours as needed (pain).    oxyCODONE (ROXICODONE) 10 mg Tab immediate release tablet Take 0.5 tablets (5 mg total) by mouth every 6 (six) hours as needed (pain).    polyethylene glycol (GLYCOLAX) 17 gram PwPk Take 17 g by mouth once daily.    predniSONE (DELTASONE) 20 MG tablet Take 0.5 tablets (10 mg total) by mouth daily as needed (as needed).    lactulose (CHRONULAC) 20 gram/30 mL Soln Take 23 mLs (15 g total) by mouth 3 (three) times daily as needed. (Patient not taking: Reported on 5/6/2021)    ondansetron (ZOFRAN) 8 MG tablet Take 1 tablet (8 mg total) by mouth every 12 (twelve) hours as needed for Nausea. (Patient not taking: Reported on 5/20/2022)    senna-docusate 8.6-50 mg (PERICOLACE) 8.6-50 mg per tablet Take 1 tablet by mouth once daily. (Patient not taking: Reported on 5/6/2021)     No current facility-administered medications for this visit.     ALLERGIES:   Review of patient's allergies indicates:    Allergen Reactions    Methotrexate analogues      Elevated Liver Enzyme    Bactrim [sulfamethoxazole-trimethoprim] Nausea And Vomiting and Rash       Review of Systems:     Review of Systems   Constitutional: Positive for fatigue (improving). Negative for appetite change, chills, diaphoresis, fever and unexpected weight change.   HENT:   Negative for hearing loss, mouth sores, nosebleeds, sore throat, trouble swallowing and voice change.    Eyes: Negative for eye problems and icterus.   Respiratory: Negative for chest tightness, cough, hemoptysis, shortness of breath and wheezing.    Cardiovascular: Negative for chest pain, leg swelling and palpitations.   Gastrointestinal: Negative for abdominal distention, abdominal pain, blood in stool, diarrhea, nausea and vomiting.   Endocrine: Negative for hot flashes.   Genitourinary: Negative for bladder incontinence, difficulty urinating, dysuria and hematuria.    Musculoskeletal: Positive for arthralgias and myalgias. Negative for back pain, flank pain, gait problem, neck pain and neck stiffness.   Skin: Negative for itching, rash and wound.   Neurological: Negative for dizziness, extremity weakness, gait problem, headaches, numbness, seizures and speech difficulty.   Hematological: Negative for adenopathy. Does not  bleed easily.   Psychiatric/Behavioral: Negative for confusion, depression and sleep disturbance. The patient is not nervous/anxious.        Physical Exam:     Vitals:    04/17/23 1429   BP: (!) 115/58   Pulse: 86   Resp: 16   Temp: 98.2 °F (36.8 °C)         Constitutional:       Appearance: She is well-developed.   HENT:      Head: Normocephalic and atraumatic.      Right Ear: External ear normal.      Left Ear: External ear normal.      Mouth/Throat:      Pharynx: No posterior oropharyngeal erythema.   Eyes:      Conjunctiva/sclera: Conjunctivae normal.   Cardiovascular:      Rate and Rhythm: Normal rate and regular rhythm.      Heart sounds: Normal heart  sounds. No murmur heard.     Pulmonary:      Effort: Pulmonary effort is normal. No respiratory distress.      Breath sounds: Normal breath sounds. No stridor. No wheezing or rales.   Abdominal:      General: Bowel sounds are normal. There is no distension.      Palpations: Abdomen is soft.      Tenderness: There is no abdominal tenderness.   Musculoskeletal:         General: Normal range of motion.      Cervical back: Normal range of motion.      Right lower leg: No edema.      Left lower leg: No edema.   Skin:     General: Skin is warm and dry.      Findings: No rash.   Neurological:      Mental Status: She is alert and oriented to person, place, and time.   Psychiatric:         Behavior: Behavior normal.         Thought Content: Thought content normal.         Judgment: Judgment normal.       ECOG Performance Status: (foot note - ECOG PS provided by Eastern Cooperative Oncology Group) 2 - Symptomatic, <50% confined to bed    Karnofsky Performance Score:  80%- Normal Activity with Effort: Some Symptoms of Disease    Labs:   Lab Results   Component Value Date    WBC 3.32 (L) 04/17/2023    HGB 11.5 (L) 04/17/2023    HCT 38.5 04/17/2023    PLT 56 (L) 04/17/2023    ALT 44 04/17/2023    AST 36 04/17/2023     04/17/2023    K 4.9 04/17/2023     04/17/2023    CREATININE 1.1 04/17/2023    BUN 15 04/17/2023    CO2 25 04/17/2023    TSH 1.913 10/17/2019    INR 0.9 01/31/2021    HGBA1C 6.0 (H) 01/31/2021       Imaging: previous imaging has been reviewed    Assessment and Plan:     Mrs. Mac is a 72 year old female with AML in second remission.     AML  -- Admitted from [facility] on 5/25/18 early AM with WBC around 100s, for suspected new non-M3 AML  --lo res HLA typing on 5/26/18 and hi res on 5/27/18 done in anticipation of possible need for future transplant   --Pt with two daughters, two full sisters (in their mid 60s, one with brain aneurysm hx, other one without any medical issues), and one full  brother (59 y/o healthy).  --NPM1 +, CEBPA (-), FLT3 (+).  On Midostaurin 50 mg PO BID until Day 14 (held starting 6/8 to 6/11). Stopped when FLAG JENNIFER re-induction started. Restarted Midostaurin at 25 mg bid on day 8 of FLAG JENNIFER induction (6/22), increase to full dose 50 mg bid (6/26) as improvement in liver enzymes. Stopped 7/3/18 due to abnormal cardiac echo.  --day 14 bone marrow biopsy completed 6/11 showing persistent disease with 15% CD 34 positive blasts  --Day 60 of FLAG-JENNIFER, tolerating without difficulty except nausea/vomiting  --day 14 restaging bone marrow biopsy done 6/29 without complication, results with no evidence of AML, FLT 3 negative, will need repeat marrow at count recovery outpatient   --recovery marrow showed no evidence of disease  --HiDAC #1 on 8/14/18, HiDAC#2 on 9/11/18  --Cycle 3 delayed 1 week due to pancytopenia. Started on 10/16/18  --recovery bone marrow showed no signs of residual leukemia  --Bone marrow biopsy done last week reveals early relapse. 11% blasts. We discussed the possibility of re-induction, but she is adamantly against. She does not want to be admitted to the hospital.  --Plan for single agent Gilteritinib 120mg has changed based on next gen sequencing that reports FLT-3 negative at this time  --Completed 4 cycles of Decitabine + Venetoclax  --Most recent marrow shows no evidence of disease  --Will continue with supportive care  --Continue to follow monthly labs    Abnormal Liver Function Tests  --ALT and AST and Alk phos have again increased substantially since re-starting midostaurin.   --midostaurin started day 8 at 25 mg bid, monitoring liver enzymes closely and improved. Increasing Midostaurin to 50 mg bid 6/26. Stopped Midostaurin (7/3) due to new diagnosis of systolic heart failure EF 35%.    --permanently discontinued midostaurin   --LFTs are now wnl    Chemotherapy induced cardiomyopathy  --cardiology follow-up as outpatient in 3 months  --repeat 2D echo on  6/15 with preserved EF of 60%.  However 7/2/18 echo with reduced EF 30-35%  --Echo on 8/15/18 was within normal range EF of 60-65%    Rheumatologic issues  --Previously on steroids and methotrexate  --Now on single agent low dose steroids [she self adjusts this medication]  --previously worked with [facility]      30 minutes were spent face to face with the patient and her family to discuss the disease, natural history, treatment options and survival statistics. I have provided the patient with an opportunity to ask questions and have all questions answered to her satisfaction.      she will return to the clinic in 4 weeks, but knows to call in the interim if symptoms change or should a problem arise.        Laquita Troy MD  Hematology and Medical Oncology  Bone Marrow Transplant  [facility]             BMT Chart Routing      Follow up with physician 4 weeks. 1. see me in 4 weeks with cbc,cmp [likes 1pm/2pm appt times] okay to double book me if needed   Follow up with SHORTY    Provider visit type    Infusion scheduling note    Injection scheduling note    Labs    Imaging    Pharmacy appointment    Other referrals

## 2023-05-03 ENCOUNTER — PATIENT MESSAGE (OUTPATIENT)
Dept: HEMATOLOGY/ONCOLOGY | Facility: CLINIC | Age: 73
End: 2023-05-03
Payer: MEDICARE

## 2023-05-25 ENCOUNTER — OFFICE VISIT (OUTPATIENT)
Dept: HEMATOLOGY/ONCOLOGY | Facility: CLINIC | Age: 73
End: 2023-05-25
Payer: MEDICARE

## 2023-05-25 ENCOUNTER — LAB VISIT (OUTPATIENT)
Dept: LAB | Facility: HOSPITAL | Age: 73
End: 2023-05-25
Payer: MEDICARE

## 2023-05-25 VITALS
WEIGHT: 206.56 LBS | TEMPERATURE: 98 F | DIASTOLIC BLOOD PRESSURE: 55 MMHG | HEART RATE: 86 BPM | BODY MASS INDEX: 35.26 KG/M2 | OXYGEN SATURATION: 96 % | HEIGHT: 64 IN | RESPIRATION RATE: 16 BRPM | SYSTOLIC BLOOD PRESSURE: 122 MMHG

## 2023-05-25 DIAGNOSIS — K21.9 LPRD (LARYNGOPHARYNGEAL REFLUX DISEASE): ICD-10-CM

## 2023-05-25 DIAGNOSIS — C92.02 ACUTE MYELOID LEUKEMIA IN RELAPSE: ICD-10-CM

## 2023-05-25 DIAGNOSIS — G89.3 CANCER RELATED PAIN: ICD-10-CM

## 2023-05-25 DIAGNOSIS — C92.00 ACUTE MYELOID LEUKEMIA NOT HAVING ACHIEVED REMISSION: Primary | ICD-10-CM

## 2023-05-25 DIAGNOSIS — Z78.9 IMPAIRED MOBILITY AND ADLS: ICD-10-CM

## 2023-05-25 DIAGNOSIS — Z74.09 IMPAIRED MOBILITY AND ADLS: ICD-10-CM

## 2023-05-25 DIAGNOSIS — M35.1 MCTD (MIXED CONNECTIVE TISSUE DISEASE): ICD-10-CM

## 2023-05-25 LAB
ALBUMIN SERPL BCP-MCNC: 3.5 G/DL (ref 3.5–5.2)
ALP SERPL-CCNC: 81 U/L (ref 55–135)
ALT SERPL W/O P-5'-P-CCNC: 46 U/L (ref 10–44)
ANION GAP SERPL CALC-SCNC: 11 MMOL/L (ref 8–16)
AST SERPL-CCNC: 36 U/L (ref 10–40)
BASOPHILS # BLD AUTO: 0.02 K/UL (ref 0–0.2)
BASOPHILS NFR BLD: 0.5 % (ref 0–1.9)
BILIRUB SERPL-MCNC: 0.6 MG/DL (ref 0.1–1)
BUN SERPL-MCNC: 14 MG/DL (ref 8–23)
CALCIUM SERPL-MCNC: 9.6 MG/DL (ref 8.7–10.5)
CHLORIDE SERPL-SCNC: 106 MMOL/L (ref 95–110)
CO2 SERPL-SCNC: 24 MMOL/L (ref 23–29)
CREAT SERPL-MCNC: 1 MG/DL (ref 0.5–1.4)
DIFFERENTIAL METHOD: ABNORMAL
EOSINOPHIL # BLD AUTO: 0 K/UL (ref 0–0.5)
EOSINOPHIL NFR BLD: 0.7 % (ref 0–8)
ERYTHROCYTE [DISTWIDTH] IN BLOOD BY AUTOMATED COUNT: 16.2 % (ref 11.5–14.5)
EST. GFR  (NO RACE VARIABLE): 59.9 ML/MIN/1.73 M^2
GLUCOSE SERPL-MCNC: 88 MG/DL (ref 70–110)
HCT VFR BLD AUTO: 37.3 % (ref 37–48.5)
HGB BLD-MCNC: 11.2 G/DL (ref 12–16)
IMM GRANULOCYTES # BLD AUTO: 0.18 K/UL (ref 0–0.04)
IMM GRANULOCYTES NFR BLD AUTO: 4.5 % (ref 0–0.5)
LYMPHOCYTES # BLD AUTO: 2.1 K/UL (ref 1–4.8)
LYMPHOCYTES NFR BLD: 53 % (ref 18–48)
MCH RBC QN AUTO: 30.9 PG (ref 27–31)
MCHC RBC AUTO-ENTMCNC: 30 G/DL (ref 32–36)
MCV RBC AUTO: 103 FL (ref 82–98)
MONOCYTES # BLD AUTO: 0.6 K/UL (ref 0.3–1)
MONOCYTES NFR BLD: 15.1 % (ref 4–15)
NEUTROPHILS # BLD AUTO: 1.1 K/UL (ref 1.8–7.7)
NEUTROPHILS NFR BLD: 26.2 % (ref 38–73)
NRBC BLD-RTO: 2 /100 WBC
PLATELET # BLD AUTO: 52 K/UL (ref 150–450)
PMV BLD AUTO: 12.9 FL (ref 9.2–12.9)
POTASSIUM SERPL-SCNC: 3.7 MMOL/L (ref 3.5–5.1)
PROT SERPL-MCNC: 6.1 G/DL (ref 6–8.4)
RBC # BLD AUTO: 3.62 M/UL (ref 4–5.4)
SODIUM SERPL-SCNC: 141 MMOL/L (ref 136–145)
WBC # BLD AUTO: 4.04 K/UL (ref 3.9–12.7)

## 2023-05-25 PROCEDURE — 99215 PR OFFICE/OUTPT VISIT, EST, LEVL V, 40-54 MIN: ICD-10-PCS | Mod: S$PBB,,, | Performed by: INTERNAL MEDICINE

## 2023-05-25 PROCEDURE — 80053 COMPREHEN METABOLIC PANEL: CPT | Performed by: INTERNAL MEDICINE

## 2023-05-25 PROCEDURE — 36415 COLL VENOUS BLD VENIPUNCTURE: CPT | Performed by: INTERNAL MEDICINE

## 2023-05-25 PROCEDURE — 99215 OFFICE O/P EST HI 40 MIN: CPT | Mod: S$PBB,,, | Performed by: INTERNAL MEDICINE

## 2023-05-25 PROCEDURE — 99999 PR PBB SHADOW E&M-EST. PATIENT-LVL III: CPT | Mod: PBBFAC,,, | Performed by: INTERNAL MEDICINE

## 2023-05-25 PROCEDURE — 99999 PR PBB SHADOW E&M-EST. PATIENT-LVL III: ICD-10-PCS | Mod: PBBFAC,,, | Performed by: INTERNAL MEDICINE

## 2023-05-25 PROCEDURE — 85025 COMPLETE CBC W/AUTO DIFF WBC: CPT | Performed by: INTERNAL MEDICINE

## 2023-05-25 PROCEDURE — 99213 OFFICE O/P EST LOW 20 MIN: CPT | Mod: PBBFAC | Performed by: INTERNAL MEDICINE

## 2023-05-25 RX ORDER — OXYCODONE HYDROCHLORIDE 20 MG/1
20 TABLET ORAL EVERY 6 HOURS PRN
Qty: 60 TABLET | Refills: 0 | Status: SHIPPED | OUTPATIENT
Start: 2023-05-25

## 2023-05-25 RX ORDER — OMEPRAZOLE 20 MG/1
20 CAPSULE, DELAYED RELEASE ORAL
Qty: 120 CAPSULE | Refills: 6 | Status: SHIPPED | OUTPATIENT
Start: 2023-05-25

## 2023-05-25 NOTE — PROGRESS NOTES
Hematology and Medical Oncology   Follow Up Note     05/25/2023    Primary Oncologic Diagnosis: AML, FLT 3 + and NPMN1+    History of Present Ilness:   Sabrina Badillo) is a pleasant 73 y.o.female presents to clinic following 2 induction therapies for AML. She was in the hospital roughly 50 days to receive 7+3 and then FLAG JENNIFER. Now on active observation.    Oncology History:   68 y.o. female admitted overnight for possible new AML. Came in from OSH with elevated WBC of 100.   05/25/2018: Pt is now on hydrea 2 grams BID, allopurinol 300 mg daily, and IVF. Pt may need rasburicase this weekend. She will undergo a BM bx and aspiration today. She is an induction candidate and will not be screened for research trials. She has anxiety for which she usually takes xanax, this was re-started.   05/26/2018 PICC placed. Reports she slept well last night. Anxiety improved with prn xanax. EF 65%, HIV and Hep panel negative. 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  6/12/2018: Day 15 of 7+3 induction, restaging BM bx done yesterday. On Midostaurin. Fever yesterday and BC with gram + cocci in clusters. On cefepime day 2 and will start Vanc today. Labial mucositis improving.   6/13/2018: Day 16 7+3. BC with staph aureus, identification pending. Surveillance blood cultures done today. Afebrile x 48 hours. On cefepime and Vanc. Labial mucositis resolved. No complaints of pain. Nausea controlled. No diarrhea. Primary complaint is fatigue.   6/14/20018: Day 17 of 7+3. Day 14 bone marrow results pending. BC with staph epidermis only sensitive to Vancomycin. Vanc day 3 and cefepime day 4. Vanc trough 12.2 last night. BP remains low but stable. Afebrile x 72 hours.   6/15/2018: Day 18 of 7+3. Day 14 bone marrow biopsy shows persistent disease with 15% blasts. Discussion with patient regarding treatment and she is deciding if she wants to proceed with re-induction vs outpatient maintenance. Temp of 100.4 overnight, unsustained.  Day 5 Cefepime and Day 4 of Vanc for staph epidermis. Repeat cultures NGTD. BP improved. Electrolytes (mag, phos, K and calcium) replaced aggressively this am and will repeat labs this afternoon. No complaints this am.   06/16/2018: Day 19 of 7+3. Day 2 of Flag-JENNIFER. Remains afebrile. Calcium remains low and have increased PO supplementation. Remains on vanc and aztreonam. Somewhat loopy feeling after receiving benadryl.   06/17/2018: Day 20 of 7+3. Day 3 of FLAG-JENNIFER. Multiple electrolyte abnormalities. New bilateral leg and feet swelling.   6/18/2018: Day 21 of 7+3 and Day 4 of FLAG JENNIFER. Tolerating well with some nausea controlled with Zofran. VSS, afebrile. ANC 1900 on Neupogen. Continues Vanc for staph epi bacteremia. Multiple electrolytes replaced today. Complains of edema to lower extremities, not improved after 20 of lasix yesterday. No sob or CP.   6/19/18: Day 22 of 7+3 and Day 5 of FLAG JENNIFER. VSS, afebrile, ANC 3900. Vanc trough elevated and dose adjusted this am. Lower extremity edema improved after 40 of lasix yesterday, net negative 300 cc I&O. Mild nausea, no abdominal pain or diarrhea. Poor appetite but no difficulty eating or taking pills.   6/20/18: Day 23 of 7+3 and Day 6 of FLAG JENNIFER. Patient complains of nausea and vomiting overnight and this am, especially when taking pills. Will add Zyprexa daily in addition to Zofran, compazine, and ativan PRN and will change meds to IV. 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 Afebrile.  No diarrhea, mouth sores or pain.  06/21/2018: Day 24 of 7+3 and Day 7 of FLAG JENNIFER. Nausea better controlled. Continued on Vanc.  6/22/2018: Day 25 of 7+3 and Day 8 of FLAG JENNIFER. Nausea improved some with Zyprexa but did have 1 episode of emesis overnight.continuing meds IV due to vomiting. Remains afebrile. ANC 0. Continues Vanc and Flagyl. Electrolytes replaced.   06/23/2018 : day 26 of 7+3 and Day 9 of FLAG  JENNIFER.  Nausea persists, worsened after taking pills so meds converted to IV.  Encouraged ambulation.  Continue with flagyl for leptotrichia goodfellowii.  RUQ US showed intrahepatic dilation, overall impression hepatic steatosis.  CBD 0.5cm.  No Gallbladder.    06/24/2018 : day 27 of 7+3 and Day 10 of FLAG JENNIFER.  Nausea persisting, able to tolerate some po pills.  Last day of flagyl (day 5).  Still pancytopenic. Appetite still low as po intake triggers nausea.  06/25/2018: day 28 of 7+3 and Day 11 of FLAG JENNIFER. Afebrile, ANC 0. Has completed Flagyl. Will decrease Vanc to 1000 mg q12, trough 19.9, will complete Vanc on 6/27. Liver enzymes stable on Midostaurin. VSS.   6/26/2018: day 29 of 7+3 and day 12 of FLAG JENNIFER. Liver enzymes improved and Midostaurin increased to full dose 50 mg bid. Nausea controlled, afebrile, VSS, NEON.  6/27/2018: day 30 of 7+3 and Day 13 of FLAG JENNIFER. Persistent nausea and emesis x 1 yesterday. Compazine changed to scheduled. Vanc ends today. Afebrile, VSS. Aggressive electrolyte replacement, low electrolytes possibly due to Midostaurin.   6/28/2018: day 31 of 7+3 and Day 14 of FLAG JENNIFER. Nausea improved with scheduled Compazine. Patient has agreed to start converting some IV meds to po. VSS, afebrile, electrolytes wnl today. Only complains of fatigue, new rash noted to upper back and chest and legs, papular rash mildly red.  6/29/2018: Day 32 of 7+3 and Day 15 of FLAG JENNIFER. Day 14 restaging bone marrow biopsy done at bedside today. Patient states nausea improved but she did have emesis last night after taking 9 pm pills. Rash improved. No mouth sores, diarrhea or pain.   7/2/2018: Day 35 of 7+3 and Day 18 of FLAG JENNIFER. Restaging BM bx results pending. Fluid overload over the weekend. Chest x-ray with pulmonary edema. Os sats down to 88%. Placed on O2 at 2 L NC, off O2 this am. Received lasix. Net negative 2.7 L today. 1 episode of nausea, no emesis. Reports anxiety relieved with PRN meds.  Electrolytes improved, afebrile, VSS.   7/3/2018: Day 36 of 7+3 and Day 19 of FLAG JENNIFER. Restaging Bm bx with GABRIEL. Cardiology consulted for abnormal echo, EF 30% with pulmonary HTN and severe L atrial enlargement. EKG with T wave abnormality, possible anterolateral ischemia and prolonged QTc of 530. Medications adjusted. Nausea controlled, no diarrhea. Electrolytes improved. On O2 as needed.   07/04/2018 : Day 37 of 7+3 and Day 20 of FLAG JENNIFER Diarrhea in AM, watery, no associated abdominal pain, nausea, or vomiting.    07/05/2018: Day 38 of 7+3 ane Day 21 of FLAG JENNIFER. No CP or SOB, cardiology following. On RA. All meds changed to po, denies nausea or vomiting and no diarrhea. VSS, afebrile.   7/6/2018: Day 22 of FLAG JENNIFER. Continues to tolerate po meds with no nausea. Afebrile. Awaiting count recovery for discharge.  7/7/2018-/8/2018: Day 40/41 of 7+3, Day 23/24 of FLAG JENNIFER.  Improving clinically.  Started on mag oxide per patient request.  Labs showing signs of ANC recovery.  7/9/2018: Day 25 FLAG JENNIFER, , continues Neupogen. Received platelets today. Afebrile, VSS. Tolerating all oral meds with no nausea or vomiting. Only complains of fatigue.   7/10/2018: Day 26 FLAG JENNIFER,  today. Had 2 episodes of vomiting and diarrhea last night. Feeling better. Afebrile, VSS.   7/11/2018: Day 27 FLAG JENNIFER,  today. No vomiting or diarrhea overnight and no nausea. Afebrile, VSS. Complains of back pain (bone pain) suspect due to count recovery. Relieved with oxy IR and Zyrtec started.  07/12/2018: Day 28 flag jennifer, engrafted today with ANC of 730. Back pain improved with zyrtec. Scheduled for IT chemo tomorrow at 1:30 pm and discharge home thereafter. Will likely need plt transfusion prior to IT chemo tomorrow   7/13/2018: Day 29 of FLAG JENNIFER. ANC up to 1300 today. Transfusing platelets today prior to LP for IT chemo. Patient continues to complain of bone pain, mostly to back and legs. No nausea, diarrhea, sob.  Afebrile and VSS.   --hospitalized 7/23-7/25 for C.diff diarrhea, neutropenic fever.  While inpatient she developed pink blisters on her palm that were concerning for relapse.  --palm biopsy on 7/26/18 for suspicion of leukemia cutis was also negative for sign of relapse  --bone marrow biopsy 7/31/18 was negative for signs of relapse   --Admitted on 8/14/18 for HiDAC#1  --Admitted on 9/11/18 for HiDAC#2  --Admitted on 10/16/18 for HiDAC#3  --Post consolidation bone marrow biopsy on 12/27/2018: AML FISH (BM) Interpretation: The result is within normal limits for the AML FISH panel. No evidence of residual disease.  --Bone Marrow Biopsy on 5/9/19 NO MORPHOLOGIC EVIDENCE OF RESIDUAL ACUTE MYELOID LEUKEMIA. RECOMMEND NGS STUDY TO RULE OUT RESIDUAL DISEASE. deletion 20q  --Bone Marrow Biopsy on 9/23/2020: CELLULARITY=40-50%, TRILINEAGE HEMATOPOIETIC ACTIVITY (M/E= 0.6:1). ERYTHROID HYPERPLASIA, DYSERYTHROPOIESIS, AND INCREASED BLAST (11%). SEE COMMENT.  FOCAL GRADE 1 RETICULAR FIBROSIS.  --Decitabine + Venetoclax: C1D1: 10/12/2020, C2D1: 11/9  --Bone marrow biopsy on 2/9/2021: VARIABLY CELLULAR MARROW WITH DYSERYTHROPOIESIS, DYSMEGAKARYOPOIESIS AND  MARKED MYELOID HYPOPLASIA.NO MORPHOLOGIC EVIDENCE OF RESIDUAL ACUTE MYELOID LEUKEMIA.    Interval History:   Mrs. Mac is a 73 y.o. patient who presents with her  for ongoing lab and symptom check for her AML. Currently on prednisone 10mg, felt the change in energy and inflamation with the drop in dose.     Continues to require oxy for pain. Has significant GERD which is relieved with BID omeprazole.    Planning several week vacation in the [place] area.    Past Medical History:   Past Medical History:   Diagnosis Date    Cancer 03/2018    AML    CHF (congestive heart failure)     Diarrhea 9/14/2018    Encounter for blood transfusion        Current Medications:   Current Outpatient Medications   Medication Sig    acetaminophen (TYLENOL) 500 MG tablet Take 500 mg  by mouth every 6 (six) hours as needed.    ALPRAZolam (XANAX) 2 MG Tab TAKE ONE TABLET BY MOUTH NIGHTLY AS NEEDED    EScitalopram oxalate (LEXAPRO) 20 MG tablet Take 1 tablet (20 mg total) by mouth every evening.    methocarbamoL (ROBAXIN) 500 MG Tab Take 1 tablet (500 mg total) by mouth 4 (four) times daily.    metoprolol succinate (TOPROL-XL) 25 MG 24 hr tablet TAKE ONE TABLET BY MOUTH DAILY    omeprazole (PRILOSEC) 20 MG capsule Take 1 capsule (20 mg total) by mouth 2 (two) times daily before meals.    ondansetron (ZOFRAN) 8 MG tablet Take 1 tablet (8 mg total) by mouth every 12 (twelve) hours as needed for Nausea.    oxyCODONE (ROXICODONE) 10 mg Tab immediate release tablet Take 1 tablet (10 mg total) by mouth every 6 (six) hours as needed (pain).    predniSONE (DELTASONE) 20 MG tablet Take 0.5 tablets (10 mg total) by mouth daily as needed (as needed).    ciprofloxacin HCl (CIPRO) 500 MG tablet Take 500 mg by mouth 2 (two) times daily.    lactulose (CHRONULAC) 20 gram/30 mL Soln Take 23 mLs (15 g total) by mouth 3 (three) times daily as needed. (Patient not taking: Reported on 5/6/2021)    naloxone (NARCAN) 4 mg/actuation Spry 4mg by nasal route as needed for opioid overdose; may repeat every 2-3 minutes in alternating nostrils until medical help arrives. Call 911    oxyCODONE (ROXICODONE) 10 mg Tab immediate release tablet Take 0.5 tablets (5 mg total) by mouth every 6 (six) hours as needed (pain).    polyethylene glycol (GLYCOLAX) 17 gram PwPk Take 17 g by mouth once daily.    senna-docusate 8.6-50 mg (PERICOLACE) 8.6-50 mg per tablet Take 1 tablet by mouth once daily. (Patient not taking: Reported on 5/6/2021)     No current facility-administered medications for this visit.     ALLERGIES:   Review of patient's allergies indicates:   Allergen Reactions    Methotrexate analogues      Elevated Liver Enzyme    Bactrim [sulfamethoxazole-trimethoprim] Nausea And Vomiting and Rash       Review of  Systems:     Review of Systems   Constitutional: Positive for fatigue (improving). Negative for appetite change, chills, diaphoresis, fever and unexpected weight change.   HENT:   Negative for hearing loss, mouth sores, nosebleeds, sore throat, trouble swallowing and voice change.    Eyes: Negative for eye problems and icterus.   Respiratory: Negative for chest tightness, cough, hemoptysis, shortness of breath and wheezing.    Cardiovascular: Negative for chest pain, leg swelling and palpitations.   Gastrointestinal: Negative for abdominal distention, abdominal pain, blood in stool, diarrhea, nausea and vomiting.   Endocrine: Negative for hot flashes.   Genitourinary: Negative for bladder incontinence, difficulty urinating, dysuria and hematuria.    Musculoskeletal: Positive for arthralgias and myalgias. Negative for back pain, flank pain, gait problem, neck pain and neck stiffness.   Skin: Negative for itching, rash and wound.   Neurological: Negative for dizziness, extremity weakness, gait problem, headaches, numbness, seizures and speech difficulty.   Hematological: Negative for adenopathy. Does not  bleed easily.   Psychiatric/Behavioral: Negative for confusion, depression and sleep disturbance. The patient is not nervous/anxious.        Physical Exam:     Vitals:    05/25/23 1334   BP: (!) 122/55   Pulse: 86   Resp: 16   Temp: 98.4 °F (36.9 °C)         Constitutional:       Appearance: She is well-developed.   HENT:      Head: Normocephalic and atraumatic.      Right Ear: External ear normal.      Left Ear: External ear normal.      Mouth/Throat:      Pharynx: No posterior oropharyngeal erythema.   Eyes:      Conjunctiva/sclera: Conjunctivae normal.   Cardiovascular:      Rate and Rhythm: Normal rate and regular rhythm.      Heart sounds: Normal heart sounds. No murmur heard.     Pulmonary:      Effort: Pulmonary effort is normal. No respiratory distress.      Breath sounds: Normal breath sounds. No stridor. No  wheezing or rales.   Abdominal:      General: Bowel sounds are normal. There is no distension.      Palpations: Abdomen is soft.      Tenderness: There is no abdominal tenderness.   Musculoskeletal:         General: Normal range of motion.      Cervical back: Normal range of motion.      Right lower leg: No edema.      Left lower leg: No edema.   Skin:     General: Skin is warm and dry.      Findings: No rash.   Neurological:      Mental Status: She is alert and oriented to person, place, and time.   Psychiatric:         Behavior: Behavior normal.         Thought Content: Thought content normal.         Judgment: Judgment normal.       ECOG Performance Status: (foot note - ECOG PS provided by Eastern Cooperative Oncology Group) 2 - Symptomatic, <50% confined to bed    Karnofsky Performance Score:  80%- Normal Activity with Effort: Some Symptoms of Disease    Labs:   Lab Results   Component Value Date    WBC 4.04 05/25/2023    HGB 11.2 (L) 05/25/2023    HCT 37.3 05/25/2023    PLT 52 (L) 05/25/2023    ALT 46 (H) 05/25/2023    AST 36 05/25/2023     05/25/2023    K 3.7 05/25/2023     05/25/2023    CREATININE 1.0 05/25/2023    BUN 14 05/25/2023    CO2 24 05/25/2023    TSH 1.913 10/17/2019    INR 0.9 01/31/2021    HGBA1C 6.0 (H) 01/31/2021       Imaging: previous imaging has been reviewed    Assessment and Plan:     Mrs. Mac is a 73 year old female with AML in second remission.     AML  -- Admitted from [facility] on 5/25/18 early AM with WBC around 100s, for suspected new non-M3 AML  --lo res HLA typing on 5/26/18 and hi res on 5/27/18 done in anticipation of possible need for future transplant   --Pt with two daughters, two full sisters (in their mid 60s, one with brain aneurysm hx, other one without any medical issues), and one full brother (61 y/o healthy).  --NPM1 +, CEBPA (-), FLT3 (+).  On Midostaurin 50 mg PO BID until Day 14 (held starting 6/8 to 6/11). Stopped when FLAG JENNIFER re-induction  started. Restarted Midostaurin at 25 mg bid on day 8 of FLAG JENNIFER induction (6/22), increase to full dose 50 mg bid (6/26) as improvement in liver enzymes. Stopped 7/3/18 due to abnormal cardiac echo.  --day 14 bone marrow biopsy completed 6/11 showing persistent disease with 15% CD 34 positive blasts  --Day 60 of FLAG-JENNIFER, tolerating without difficulty except nausea/vomiting  --day 14 restaging bone marrow biopsy done 6/29 without complication, results with no evidence of AML, FLT 3 negative, will need repeat marrow at count recovery outpatient   --recovery marrow showed no evidence of disease  --HiDAC #1 on 8/14/18, HiDAC#2 on 9/11/18  --Cycle 3 delayed 1 week due to pancytopenia. Started on 10/16/18  --recovery bone marrow showed no signs of residual leukemia  --Bone marrow biopsy done last week reveals early relapse. 11% blasts. We discussed the possibility of re-induction, but she is adamantly against. She does not want to be admitted to the hospital.  --Plan for single agent Gilteritinib 120mg has changed based on next gen sequencing that reports FLT-3 negative at this time  --Completed 4 cycles of Decitabine + Venetoclax  --Most recent marrow shows no evidence of disease  --Will continue with supportive care  --Continue to follow monthly labs    Abnormal Liver Function Tests  --ALT and AST and Alk phos have again increased substantially since re-starting midostaurin.   --midostaurin started day 8 at 25 mg bid, monitoring liver enzymes closely and improved. Increasing Midostaurin to 50 mg bid 6/26. Stopped Midostaurin (7/3) due to new diagnosis of systolic heart failure EF 35%.    --permanently discontinued midostaurin   --LFTs are now wnl    Chemotherapy induced cardiomyopathy  --cardiology follow-up as outpatient in 3 months  --repeat 2D echo on 6/15 with preserved EF of 60%.  However 7/2/18 echo with reduced EF 30-35%  --Echo on 8/15/18 was within normal range EF of 60-65%    Rheumatologic  issues  --Previously on steroids and methotrexate  --Now on single agent low dose steroids [she self adjusts this medication]  --previously worked with [facility]      30 minutes were spent face to face with the patient and her family to discuss the disease, natural history, treatment options and survival statistics. I have provided the patient with an opportunity to ask questions and have all questions answered to her satisfaction.      she will return to the clinic in 4 weeks, but knows to call in the interim if symptoms change or should a problem arise.        Laquita Troy MD  Hematology and Medical Oncology  Bone Marrow Transplant  [facility]             BMT Chart Routing  Urgent    Follow up with physician Other. 1. see me july 3rd or 5th at 2pm with cbc,cmp [okay that i'm on inpatient service]   Follow up with SHORTY    Provider visit type    Infusion scheduling note    Injection scheduling note    Labs    Imaging    Pharmacy appointment    Other referrals

## 2023-07-05 ENCOUNTER — OFFICE VISIT (OUTPATIENT)
Dept: HEMATOLOGY/ONCOLOGY | Facility: CLINIC | Age: 73
End: 2023-07-05
Payer: MEDICARE

## 2023-07-05 ENCOUNTER — LAB VISIT (OUTPATIENT)
Dept: LAB | Facility: HOSPITAL | Age: 73
End: 2023-07-05
Payer: MEDICARE

## 2023-07-05 VITALS
HEIGHT: 64 IN | TEMPERATURE: 98 F | OXYGEN SATURATION: 96 % | DIASTOLIC BLOOD PRESSURE: 58 MMHG | HEART RATE: 79 BPM | SYSTOLIC BLOOD PRESSURE: 120 MMHG | WEIGHT: 200 LBS | BODY MASS INDEX: 34.15 KG/M2 | RESPIRATION RATE: 18 BRPM

## 2023-07-05 DIAGNOSIS — R11.2 CINV (CHEMOTHERAPY-INDUCED NAUSEA AND VOMITING): ICD-10-CM

## 2023-07-05 DIAGNOSIS — T45.1X5A CINV (CHEMOTHERAPY-INDUCED NAUSEA AND VOMITING): ICD-10-CM

## 2023-07-05 DIAGNOSIS — R79.89 ELEVATED LFTS: ICD-10-CM

## 2023-07-05 DIAGNOSIS — C92.00 ACUTE MYELOID LEUKEMIA NOT HAVING ACHIEVED REMISSION: Primary | ICD-10-CM

## 2023-07-05 DIAGNOSIS — C92.00 ACUTE MYELOID LEUKEMIA NOT HAVING ACHIEVED REMISSION: ICD-10-CM

## 2023-07-05 LAB
ALBUMIN SERPL BCP-MCNC: 3.6 G/DL (ref 3.5–5.2)
ALP SERPL-CCNC: 105 U/L (ref 55–135)
ALT SERPL W/O P-5'-P-CCNC: 76 U/L (ref 10–44)
ANION GAP SERPL CALC-SCNC: 11 MMOL/L (ref 8–16)
ANISOCYTOSIS BLD QL SMEAR: SLIGHT
AST SERPL-CCNC: 36 U/L (ref 10–40)
BASO STIPL BLD QL SMEAR: ABNORMAL
BASOPHILS NFR BLD: 0 % (ref 0–1.9)
BILIRUB SERPL-MCNC: 0.7 MG/DL (ref 0.1–1)
BUN SERPL-MCNC: 13 MG/DL (ref 8–23)
CALCIUM SERPL-MCNC: 9.6 MG/DL (ref 8.7–10.5)
CHLORIDE SERPL-SCNC: 108 MMOL/L (ref 95–110)
CO2 SERPL-SCNC: 26 MMOL/L (ref 23–29)
CREAT SERPL-MCNC: 1.1 MG/DL (ref 0.5–1.4)
DACRYOCYTES BLD QL SMEAR: ABNORMAL
DIFFERENTIAL METHOD: ABNORMAL
EOSINOPHIL NFR BLD: 1 % (ref 0–8)
ERYTHROCYTE [DISTWIDTH] IN BLOOD BY AUTOMATED COUNT: 17 % (ref 11.5–14.5)
EST. GFR  (NO RACE VARIABLE): 53.4 ML/MIN/1.73 M^2
GLUCOSE SERPL-MCNC: 89 MG/DL (ref 70–110)
HCT VFR BLD AUTO: 36.7 % (ref 37–48.5)
HGB BLD-MCNC: 11.1 G/DL (ref 12–16)
IMM GRANULOCYTES # BLD AUTO: ABNORMAL K/UL (ref 0–0.04)
IMM GRANULOCYTES NFR BLD AUTO: ABNORMAL % (ref 0–0.5)
LYMPHOCYTES NFR BLD: 52 % (ref 18–48)
MCH RBC QN AUTO: 32.5 PG (ref 27–31)
MCHC RBC AUTO-ENTMCNC: 30.2 G/DL (ref 32–36)
MCV RBC AUTO: 107 FL (ref 82–98)
METAMYELOCYTES NFR BLD MANUAL: 6 %
MONOCYTES NFR BLD: 11 % (ref 4–15)
MYELOCYTES NFR BLD MANUAL: 2 %
NEUTROPHILS NFR BLD: 28 % (ref 38–73)
NRBC BLD-RTO: 3 /100 WBC
OVALOCYTES BLD QL SMEAR: ABNORMAL
PLATELET # BLD AUTO: 58 K/UL (ref 150–450)
PLATELET BLD QL SMEAR: ABNORMAL
PMV BLD AUTO: 12.4 FL (ref 9.2–12.9)
POIKILOCYTOSIS BLD QL SMEAR: SLIGHT
POLYCHROMASIA BLD QL SMEAR: ABNORMAL
POTASSIUM SERPL-SCNC: 4.5 MMOL/L (ref 3.5–5.1)
PROT SERPL-MCNC: 6.3 G/DL (ref 6–8.4)
RBC # BLD AUTO: 3.42 M/UL (ref 4–5.4)
SODIUM SERPL-SCNC: 145 MMOL/L (ref 136–145)
SPHEROCYTES BLD QL SMEAR: ABNORMAL
WBC # BLD AUTO: 3.55 K/UL (ref 3.9–12.7)

## 2023-07-05 PROCEDURE — 80053 COMPREHEN METABOLIC PANEL: CPT | Performed by: INTERNAL MEDICINE

## 2023-07-05 PROCEDURE — 99215 PR OFFICE/OUTPT VISIT, EST, LEVL V, 40-54 MIN: ICD-10-PCS | Mod: S$PBB,,, | Performed by: INTERNAL MEDICINE

## 2023-07-05 PROCEDURE — 99999 PR PBB SHADOW E&M-EST. PATIENT-LVL III: CPT | Mod: PBBFAC,,, | Performed by: INTERNAL MEDICINE

## 2023-07-05 PROCEDURE — 99213 OFFICE O/P EST LOW 20 MIN: CPT | Mod: PBBFAC | Performed by: INTERNAL MEDICINE

## 2023-07-05 PROCEDURE — 99215 OFFICE O/P EST HI 40 MIN: CPT | Mod: S$PBB,,, | Performed by: INTERNAL MEDICINE

## 2023-07-05 PROCEDURE — 99999 PR PBB SHADOW E&M-EST. PATIENT-LVL III: ICD-10-PCS | Mod: PBBFAC,,, | Performed by: INTERNAL MEDICINE

## 2023-07-05 PROCEDURE — 85007 BL SMEAR W/DIFF WBC COUNT: CPT | Performed by: INTERNAL MEDICINE

## 2023-07-05 PROCEDURE — 85027 COMPLETE CBC AUTOMATED: CPT | Performed by: INTERNAL MEDICINE

## 2023-07-05 PROCEDURE — 36415 COLL VENOUS BLD VENIPUNCTURE: CPT | Performed by: INTERNAL MEDICINE

## 2023-07-05 NOTE — PROGRESS NOTES
Hematology and Medical Oncology   Follow Up Note     07/05/2023    Primary Oncologic Diagnosis: AML, FLT 3 + and NPMN1+    History of Present Ilness:   Sabrina Badillo) is a pleasant 73 y.o.female presents to clinic following 2 induction therapies for AML. She was in the hospital roughly 50 days to receive 7+3 and then FLAG JENNIFER. Now on active observation.    Oncology History:   68 y.o. female admitted overnight for possible new AML. Came in from OSH with elevated WBC of 100.   05/25/2018: Pt is now on hydrea 2 grams BID, allopurinol 300 mg daily, and IVF. Pt may need rasburicase this weekend. She will undergo a BM bx and aspiration today. She is an induction candidate and will not be screened for research trials. She has anxiety for which she usually takes xanax, this was re-started.   05/26/2018 PICC placed. Reports she slept well last night. Anxiety improved with prn xanax. EF 65%, HIV and Hep panel negative. 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  6/12/2018: Day 15 of 7+3 induction, restaging BM bx done yesterday. On Midostaurin. Fever yesterday and BC with gram + cocci in clusters. On cefepime day 2 and will start Vanc today. Labial mucositis improving.   6/13/2018: Day 16 7+3. BC with staph aureus, identification pending. Surveillance blood cultures done today. Afebrile x 48 hours. On cefepime and Vanc. Labial mucositis resolved. No complaints of pain. Nausea controlled. No diarrhea. Primary complaint is fatigue.   6/14/20018: Day 17 of 7+3. Day 14 bone marrow results pending. BC with staph epidermis only sensitive to Vancomycin. Vanc day 3 and cefepime day 4. Vanc trough 12.2 last night. BP remains low but stable. Afebrile x 72 hours.   6/15/2018: Day 18 of 7+3. Day 14 bone marrow biopsy shows persistent disease with 15% blasts. Discussion with patient regarding treatment and she is deciding if she wants to proceed with re-induction vs outpatient maintenance. Temp of 100.4 overnight, unsustained.  Day 5 Cefepime and Day 4 of Vanc for staph epidermis. Repeat cultures NGTD. BP improved. Electrolytes (mag, phos, K and calcium) replaced aggressively this am and will repeat labs this afternoon. No complaints this am.   06/16/2018: Day 19 of 7+3. Day 2 of Flag-JENNIFER. Remains afebrile. Calcium remains low and have increased PO supplementation. Remains on vanc and aztreonam. Somewhat loopy feeling after receiving benadryl.   06/17/2018: Day 20 of 7+3. Day 3 of FLAG-JENNIFER. Multiple electrolyte abnormalities. New bilateral leg and feet swelling.   6/18/2018: Day 21 of 7+3 and Day 4 of FLAG JENNIFER. Tolerating well with some nausea controlled with Zofran. VSS, afebrile. ANC 1900 on Neupogen. Continues Vanc for staph epi bacteremia. Multiple electrolytes replaced today. Complains of edema to lower extremities, not improved after 20 of lasix yesterday. No sob or CP.   6/19/18: Day 22 of 7+3 and Day 5 of FLAG JENNIFER. VSS, afebrile, ANC 3900. Vanc trough elevated and dose adjusted this am. Lower extremity edema improved after 40 of lasix yesterday, net negative 300 cc I&O. Mild nausea, no abdominal pain or diarrhea. Poor appetite but no difficulty eating or taking pills.   6/20/18: Day 23 of 7+3 and Day 6 of FLAG JENNIFER. Patient complains of nausea and vomiting overnight and this am, especially when taking pills. Will add Zyprexa daily in addition to Zofran, compazine, and ativan PRN and will change meds to IV. 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 Afebrile.  No diarrhea, mouth sores or pain.  06/21/2018: Day 24 of 7+3 and Day 7 of FLAG JENNIFER. Nausea better controlled. Continued on Vanc.  6/22/2018: Day 25 of 7+3 and Day 8 of FLAG JENNIFER. Nausea improved some with Zyprexa but did have 1 episode of emesis overnight.continuing meds IV due to vomiting. Remains afebrile. ANC 0. Continues Vanc and Flagyl. Electrolytes replaced.   06/23/2018 : day 26 of 7+3 and Day 9 of FLAG  JENNIFER.  Nausea persists, worsened after taking pills so meds converted to IV.  Encouraged ambulation.  Continue with flagyl for leptotrichia goodfellowii.  RUQ US showed intrahepatic dilation, overall impression hepatic steatosis.  CBD 0.5cm.  No Gallbladder.    06/24/2018 : day 27 of 7+3 and Day 10 of FLAG JENNIFER.  Nausea persisting, able to tolerate some po pills.  Last day of flagyl (day 5).  Still pancytopenic. Appetite still low as po intake triggers nausea.  06/25/2018: day 28 of 7+3 and Day 11 of FLAG JENNIFER. Afebrile, ANC 0. Has completed Flagyl. Will decrease Vanc to 1000 mg q12, trough 19.9, will complete Vanc on 6/27. Liver enzymes stable on Midostaurin. VSS.   6/26/2018: day 29 of 7+3 and day 12 of FLAG JENNIFER. Liver enzymes improved and Midostaurin increased to full dose 50 mg bid. Nausea controlled, afebrile, VSS, NEON.  6/27/2018: day 30 of 7+3 and Day 13 of FLAG JENNIFER. Persistent nausea and emesis x 1 yesterday. Compazine changed to scheduled. Vanc ends today. Afebrile, VSS. Aggressive electrolyte replacement, low electrolytes possibly due to Midostaurin.   6/28/2018: day 31 of 7+3 and Day 14 of FLAG JENNIFER. Nausea improved with scheduled Compazine. Patient has agreed to start converting some IV meds to po. VSS, afebrile, electrolytes wnl today. Only complains of fatigue, new rash noted to upper back and chest and legs, papular rash mildly red.  6/29/2018: Day 32 of 7+3 and Day 15 of FLAG JENNIFER. Day 14 restaging bone marrow biopsy done at bedside today. Patient states nausea improved but she did have emesis last night after taking 9 pm pills. Rash improved. No mouth sores, diarrhea or pain.   7/2/2018: Day 35 of 7+3 and Day 18 of FLAG JENNIFER. Restaging BM bx results pending. Fluid overload over the weekend. Chest x-ray with pulmonary edema. Os sats down to 88%. Placed on O2 at 2 L NC, off O2 this am. Received lasix. Net negative 2.7 L today. 1 episode of nausea, no emesis. Reports anxiety relieved with PRN meds.  Electrolytes improved, afebrile, VSS.   7/3/2018: Day 36 of 7+3 and Day 19 of FLAG JENNIFER. Restaging Bm bx with GABRIEL. Cardiology consulted for abnormal echo, EF 30% with pulmonary HTN and severe L atrial enlargement. EKG with T wave abnormality, possible anterolateral ischemia and prolonged QTc of 530. Medications adjusted. Nausea controlled, no diarrhea. Electrolytes improved. On O2 as needed.   07/04/2018 : Day 37 of 7+3 and Day 20 of FLAG JENNIFER Diarrhea in AM, watery, no associated abdominal pain, nausea, or vomiting.    07/05/2018: Day 38 of 7+3 ane Day 21 of FLAG JENNIFER. No CP or SOB, cardiology following. On RA. All meds changed to po, denies nausea or vomiting and no diarrhea. VSS, afebrile.   7/6/2018: Day 22 of FLAG JENNIFER. Continues to tolerate po meds with no nausea. Afebrile. Awaiting count recovery for discharge.  7/7/2018-/8/2018: Day 40/41 of 7+3, Day 23/24 of FLAG JENNIFER.  Improving clinically.  Started on mag oxide per patient request.  Labs showing signs of ANC recovery.  7/9/2018: Day 25 FLAG JENNIFER, , continues Neupogen. Received platelets today. Afebrile, VSS. Tolerating all oral meds with no nausea or vomiting. Only complains of fatigue.   7/10/2018: Day 26 FLAG JENNIFER,  today. Had 2 episodes of vomiting and diarrhea last night. Feeling better. Afebrile, VSS.   7/11/2018: Day 27 FLAG JENNIFER,  today. No vomiting or diarrhea overnight and no nausea. Afebrile, VSS. Complains of back pain (bone pain) suspect due to count recovery. Relieved with oxy IR and Zyrtec started.  07/12/2018: Day 28 flag jennifer, engrafted today with ANC of 730. Back pain improved with zyrtec. Scheduled for IT chemo tomorrow at 1:30 pm and discharge home thereafter. Will likely need plt transfusion prior to IT chemo tomorrow   7/13/2018: Day 29 of FLAG JENNIFER. ANC up to 1300 today. Transfusing platelets today prior to LP for IT chemo. Patient continues to complain of bone pain, mostly to back and legs. No nausea, diarrhea, sob.  Afebrile and VSS.   --hospitalized 7/23-7/25 for C.diff diarrhea, neutropenic fever.  While inpatient she developed pink blisters on her palm that were concerning for relapse.  --palm biopsy on 7/26/18 for suspicion of leukemia cutis was also negative for sign of relapse  --bone marrow biopsy 7/31/18 was negative for signs of relapse   --Admitted on 8/14/18 for HiDAC#1  --Admitted on 9/11/18 for HiDAC#2  --Admitted on 10/16/18 for HiDAC#3  --Post consolidation bone marrow biopsy on 12/27/2018: AML FISH (BM) Interpretation: The result is within normal limits for the AML FISH panel. No evidence of residual disease.  --Bone Marrow Biopsy on 5/9/19 NO MORPHOLOGIC EVIDENCE OF RESIDUAL ACUTE MYELOID LEUKEMIA. RECOMMEND NGS STUDY TO RULE OUT RESIDUAL DISEASE. deletion 20q  --Bone Marrow Biopsy on 9/23/2020: CELLULARITY=40-50%, TRILINEAGE HEMATOPOIETIC ACTIVITY (M/E= 0.6:1). ERYTHROID HYPERPLASIA, DYSERYTHROPOIESIS, AND INCREASED BLAST (11%). SEE COMMENT.  FOCAL GRADE 1 RETICULAR FIBROSIS.  --Decitabine + Venetoclax: C1D1: 10/12/2020, C2D1: 11/9  --Bone marrow biopsy on 2/9/2021: VARIABLY CELLULAR MARROW WITH DYSERYTHROPOIESIS, DYSMEGAKARYOPOIESIS AND  MARKED MYELOID HYPOPLASIA.NO MORPHOLOGIC EVIDENCE OF RESIDUAL ACUTE MYELOID LEUKEMIA.    Interval History:   Mrs. Mac is a 73 y.o. patient who presents with her  for ongoing lab and symptom check for her AML. Continues to take long term prednisone 10mg, for inflammatory response to her rheumatologic issues.     Continues to require oxy for pain. Has significant GERD which is relieved with BID omeprazole.     Leaving on multi week road trip to [place] tomorrow.     Past Medical History:   Past Medical History:   Diagnosis Date    Cancer 03/2018    AML    CHF (congestive heart failure)     Diarrhea 9/14/2018    Encounter for blood transfusion        Current Medications:   Current Outpatient Medications   Medication Sig    acetaminophen (TYLENOL) 500 MG tablet  Take 500 mg by mouth every 6 (six) hours as needed.    ALPRAZolam (XANAX) 2 MG Tab TAKE ONE TABLET BY MOUTH NIGHTLY AS NEEDED    EScitalopram oxalate (LEXAPRO) 20 MG tablet Take 1 tablet (20 mg total) by mouth every evening.    methocarbamoL (ROBAXIN) 500 MG Tab Take 1 tablet (500 mg total) by mouth 4 (four) times daily.    metoprolol succinate (TOPROL-XL) 25 MG 24 hr tablet TAKE ONE TABLET BY MOUTH DAILY    omeprazole (PRILOSEC) 20 MG capsule Take 1 capsule (20 mg total) by mouth 2 (two) times daily before meals.    ondansetron (ZOFRAN) 8 MG tablet Take 1 tablet (8 mg total) by mouth every 12 (twelve) hours as needed for Nausea.    oxyCODONE (ROXICODONE) 10 mg Tab immediate release tablet Take 1 tablet (10 mg total) by mouth every 6 (six) hours as needed (pain).    oxyCODONE (ROXICODONE) 20 mg Tab immediate release tablet Take 1 tablet (20 mg total) by mouth every 6 (six) hours as needed (pain).    predniSONE (DELTASONE) 20 MG tablet Take 0.5 tablets (10 mg total) by mouth daily as needed (as needed).    ciprofloxacin HCl (CIPRO) 500 MG tablet Take 500 mg by mouth 2 (two) times daily.    lactulose (CHRONULAC) 20 gram/30 mL Soln Take 23 mLs (15 g total) by mouth 3 (three) times daily as needed. (Patient not taking: Reported on 5/6/2021)    naloxone (NARCAN) 4 mg/actuation Spry 4mg by nasal route as needed for opioid overdose; may repeat every 2-3 minutes in alternating nostrils until medical help arrives. Call 911    polyethylene glycol (GLYCOLAX) 17 gram PwPk Take 17 g by mouth once daily.    senna-docusate 8.6-50 mg (PERICOLACE) 8.6-50 mg per tablet Take 1 tablet by mouth once daily. (Patient not taking: Reported on 5/6/2021)     No current facility-administered medications for this visit.     ALLERGIES:   Review of patient's allergies indicates:   Allergen Reactions    Methotrexate analogues      Elevated Liver Enzyme    Bactrim [sulfamethoxazole-trimethoprim] Nausea And Vomiting and Rash        Review of Systems:     Review of Systems   Constitutional: Positive for fatigue (improving). Negative for appetite change, chills, diaphoresis, fever and unexpected weight change.   HENT:   Negative for hearing loss, mouth sores, nosebleeds, sore throat, trouble swallowing and voice change.    Eyes: Negative for eye problems and icterus.   Respiratory: Negative for chest tightness, cough, hemoptysis, shortness of breath and wheezing.    Cardiovascular: Negative for chest pain, leg swelling and palpitations.   Gastrointestinal: Negative for abdominal distention, abdominal pain, blood in stool, diarrhea, nausea and vomiting.   Endocrine: Negative for hot flashes.   Genitourinary: Negative for bladder incontinence, difficulty urinating, dysuria and hematuria.    Musculoskeletal: Positive for arthralgias and myalgias. Negative for back pain, flank pain, gait problem, neck pain and neck stiffness.   Skin: Negative for itching, rash and wound.   Neurological: Negative for dizziness, extremity weakness, gait problem, headaches, numbness, seizures and speech difficulty.   Hematological: Negative for adenopathy. Does not  bleed easily.   Psychiatric/Behavioral: Negative for confusion, depression and sleep disturbance. The patient is not nervous/anxious.        Physical Exam:     Vitals:    07/05/23 1346   BP: (!) 120/58   Pulse: 79   Resp: 18   Temp: 98.4 °F (36.9 °C)       Constitutional:       Appearance: She is well-developed.   HENT:      Head: Normocephalic and atraumatic.      Right Ear: External ear normal.      Left Ear: External ear normal.      Mouth/Throat:      Pharynx: No posterior oropharyngeal erythema.   Eyes:      Conjunctiva/sclera: Conjunctivae normal.   Cardiovascular:      Rate and Rhythm: Normal rate and regular rhythm.      Heart sounds: Normal heart sounds. No murmur heard.     Pulmonary:      Effort: Pulmonary effort is normal. No respiratory distress.      Breath sounds: Normal breath sounds.  No stridor. No wheezing or rales.   Abdominal:      General: Bowel sounds are normal. There is no distension.      Palpations: Abdomen is soft.      Tenderness: There is no abdominal tenderness.   Musculoskeletal:         General: Normal range of motion.      Cervical back: Normal range of motion.      Right lower leg: No edema.      Left lower leg: No edema.   Skin:     General: Skin is warm and dry.      Findings: No rash.   Neurological:      Mental Status: She is alert and oriented to person, place, and time.   Psychiatric:         Behavior: Behavior normal.         Thought Content: Thought content normal.         Judgment: Judgment normal.       ECOG Performance Status: (foot note - ECOG PS provided by Eastern Cooperative Oncology Group) 2 - Symptomatic, <50% confined to bed    Karnofsky Performance Score:  80%- Normal Activity with Effort: Some Symptoms of Disease    Labs:   Lab Results   Component Value Date    WBC 3.55 (L) 07/05/2023    HGB 11.1 (L) 07/05/2023    HCT 36.7 (L) 07/05/2023    PLT 58 (L) 07/05/2023    ALT 76 (H) 07/05/2023    AST 36 07/05/2023     07/05/2023    K 4.5 07/05/2023     07/05/2023    CREATININE 1.1 07/05/2023    BUN 13 07/05/2023    CO2 26 07/05/2023    TSH 1.913 10/17/2019    INR 0.9 01/31/2021    HGBA1C 6.0 (H) 01/31/2021       Imaging: previous imaging has been reviewed    Assessment and Plan:     Mrs. Mac is a 73 year old female with AML in second remission.     AML  -- Admitted from [facility] on 5/25/18 early AM with WBC around 100s, for suspected new non-M3 AML  --lo res HLA typing on 5/26/18 and hi res on 5/27/18 done in anticipation of possible need for future transplant   --Pt with two daughters, two full sisters (in their mid 60s, one with brain aneurysm hx, other one without any medical issues), and one full brother (59 y/o healthy).  --NPM1 +, CEBPA (-), FLT3 (+).  On Midostaurin 50 mg PO BID until Day 14 (held starting 6/8 to 6/11). Stopped when FLAG  JENNIFER re-induction started. Restarted Midostaurin at 25 mg bid on day 8 of FLAG JENNIFER induction (6/22), increase to full dose 50 mg bid (6/26) as improvement in liver enzymes. Stopped 7/3/18 due to abnormal cardiac echo.  --day 14 bone marrow biopsy completed 6/11 showing persistent disease with 15% CD 34 positive blasts  --Day 60 of FLAG-JENNIFER, tolerating without difficulty except nausea/vomiting  --day 14 restaging bone marrow biopsy done 6/29 without complication, results with no evidence of AML, FLT 3 negative, will need repeat marrow at count recovery outpatient   --recovery marrow showed no evidence of disease  --HiDAC #1 on 8/14/18, HiDAC#2 on 9/11/18  --Cycle 3 delayed 1 week due to pancytopenia. Started on 10/16/18  --recovery bone marrow showed no signs of residual leukemia  --Bone marrow biopsy done last week reveals early relapse. 11% blasts. We discussed the possibility of re-induction, but she is adamantly against. She does not want to be admitted to the hospital.  --Plan for single agent Gilteritinib 120mg has changed based on next gen sequencing that reports FLT-3 negative at this time  --Completed 4 cycles of Decitabine + Venetoclax  --Most recent marrow shows no evidence of disease  --Will continue with supportive care  --Continue to follow monthly labs    Abnormal Liver Function Tests  --ALT and AST and Alk phos have again increased substantially since re-starting midostaurin.   --midostaurin started day 8 at 25 mg bid, monitoring liver enzymes closely and improved. Increasing Midostaurin to 50 mg bid 6/26. Stopped Midostaurin (7/3) due to new diagnosis of systolic heart failure EF 35%.    --permanently discontinued midostaurin   --LFTs are now wnl    Chemotherapy induced cardiomyopathy  --cardiology follow-up as outpatient in 3 months  --repeat 2D echo on 6/15 with preserved EF of 60%.  However 7/2/18 echo with reduced EF 30-35%  --Echo on 8/15/18 was within normal range EF of 60-65%    Rheumatologic  issues  --Previously on steroids and methotrexate  --Now on single agent low dose steroids [she self adjusts this medication]  --previously worked with [facility] rheumatology      30 minutes were spent face to face with the patient and her family to discuss the disease, natural history, treatment options and survival statistics. I have provided the patient with an opportunity to ask questions and have all questions answered to her satisfaction.      she will return to the clinic in 4 weeks, but knows to call in the interim if symptoms change or should a problem arise.        Laquita Troy MD  Hematology and Medical Oncology  Bone Marrow Transplant  [facility]             BMT Chart Routing      Follow up with physician 4 weeks. 1. see me aug 1,2, or 3rd with cbc,cmp [she likes 1 or 2 pm appointments]   Follow up with SHORTY    Provider visit type    Infusion scheduling note    Injection scheduling note    Labs    Imaging    Pharmacy appointment    Other referrals

## 2023-07-28 DIAGNOSIS — I10 HYPERTENSION, UNSPECIFIED TYPE: ICD-10-CM

## 2023-07-31 RX ORDER — METOPROLOL SUCCINATE 25 MG/1
TABLET, EXTENDED RELEASE ORAL
Qty: 30 TABLET | Refills: 11 | Status: SHIPPED | OUTPATIENT
Start: 2023-07-31 | End: 2023-08-17 | Stop reason: SDUPTHER

## 2023-08-02 ENCOUNTER — TELEPHONE (OUTPATIENT)
Dept: HEMATOLOGY/ONCOLOGY | Facility: CLINIC | Age: 73
End: 2023-08-02
Payer: MEDICARE

## 2023-08-02 NOTE — TELEPHONE ENCOUNTER
"----- Message from Umu Mcmaohn sent at 8/2/2023  9:20 AM CDT -----  Regarding: Pt advice  Contact: Jesi Mac (Spouse)      Pt requesting call back in regards to getting lab work completed before appt tomorrow 08/03  Please call and adv @       Confirmed contact below:   Contact Name:Jesi Mac (Spouse)   Phone Number: [phone]               Additional Notes:  "Thank you for all that you do for our patients"                                           "

## 2023-08-02 NOTE — TELEPHONE ENCOUNTER
"----- Message from Umu Mcmahon sent at 8/2/2023 10:36 AM CDT -----  Regarding: Pt advice  Contact: Pt     Pt requesting call back in regards to messages received for appts tomorrow  Please call and adv @       Confirmed contact below:   Contact Name:Jesi Mac (Spouse)   [phone] (Mobile)                 Additional Notes:  "Thank you for all that you do for our patients"                                           "

## 2023-08-03 ENCOUNTER — TELEPHONE (OUTPATIENT)
Dept: HEMATOLOGY/ONCOLOGY | Facility: CLINIC | Age: 73
End: 2023-08-03
Payer: MEDICARE

## 2023-08-15 ENCOUNTER — TELEPHONE (OUTPATIENT)
Dept: HEMATOLOGY/ONCOLOGY | Facility: CLINIC | Age: 73
End: 2023-08-15
Payer: MEDICARE

## 2023-08-15 NOTE — TELEPHONE ENCOUNTER
Spoke with patient .  states that Ms. Sorto is still not doing well. She has on going nausea, decrease appetite. Is drinking 16 to 21 ounces a day. She is weak and having difficulty getting around the house. Pt temp is 97.8 currently. Denies diarrhea and respiratory symptoms.   is concerned that she may need blood. Reviewed that hgb 5 days ago was 10s. Pt  stated they currently do not have any home health services. She did not stay overnight in the hospital. He stated they just gave her some drips. He stated that he spoke with Dr. Troy last week and she has additional information.

## 2023-08-15 NOTE — TELEPHONE ENCOUNTER
----- Message from Laquita Jerez sent at 8/15/2023 12:49 PM CDT -----  Regarding: Consult Advisory  Contact: Pt spouse  Pt spouse is requesting a callback regarding pt current status and symptoms as of today. Please adv           Confirmed contact below:   Contact Name:Jesi Mac  Phone Number: [phone]

## 2023-08-15 NOTE — TELEPHONE ENCOUNTER
"----- Message from Alfie Charlotte sent at 8/15/2023  1:56 PM CDT -----  Consult/Advisory:          Name Of Caller: Jesi Mac (Spouse)       Contact Preference?:  [phone] (Mobile)      Provider Name: Sydni      Does patient feel the need to be seen today? No      What is the nature of the call?: Returning call to Liliam          Additional Notes:  "Thank you for all that you do for our patients"       "

## 2023-08-16 ENCOUNTER — TELEPHONE (OUTPATIENT)
Dept: HEMATOLOGY/ONCOLOGY | Facility: CLINIC | Age: 73
End: 2023-08-16
Payer: MEDICARE

## 2023-08-16 NOTE — TELEPHONE ENCOUNTER
----- Message from Dede Rodriguez sent at 8/16/2023 12:32 PM CDT -----  Regarding: Consult/Advisory      Name Of Caller:     Mr. Dennison (Spouse)      Contact Preference:    [phone]      Nature of call:   Pt's  would like Liliam to give him a call. He did not disclose any more information.

## 2023-08-16 NOTE — TELEPHONE ENCOUNTER
Called [facility]. No answer. I left a voice mail. Please let me know if they call back C/o asthma exacerbation. Pt states, "I take my inhalers and nothing is working". Hx of asthma.

## 2023-08-17 ENCOUNTER — OFFICE VISIT (OUTPATIENT)
Dept: HEMATOLOGY/ONCOLOGY | Facility: CLINIC | Age: 73
End: 2023-08-17
Payer: MEDICARE

## 2023-08-17 ENCOUNTER — LAB VISIT (OUTPATIENT)
Dept: LAB | Facility: HOSPITAL | Age: 73
End: 2023-08-17
Attending: INTERNAL MEDICINE
Payer: MEDICARE

## 2023-08-17 VITALS
DIASTOLIC BLOOD PRESSURE: 68 MMHG | TEMPERATURE: 99 F | HEIGHT: 64 IN | RESPIRATION RATE: 16 BRPM | WEIGHT: 194.88 LBS | SYSTOLIC BLOOD PRESSURE: 122 MMHG | HEART RATE: 96 BPM | BODY MASS INDEX: 33.27 KG/M2 | OXYGEN SATURATION: 96 %

## 2023-08-17 DIAGNOSIS — C92.01 ACUTE MYELOID LEUKEMIA IN REMISSION: Primary | ICD-10-CM

## 2023-08-17 DIAGNOSIS — I10 HYPERTENSION, UNSPECIFIED TYPE: ICD-10-CM

## 2023-08-17 DIAGNOSIS — M35.1 MCTD (MIXED CONNECTIVE TISSUE DISEASE): ICD-10-CM

## 2023-08-17 DIAGNOSIS — C92.00 ACUTE MYELOID LEUKEMIA NOT HAVING ACHIEVED REMISSION: ICD-10-CM

## 2023-08-17 DIAGNOSIS — C92.00 ACUTE MYELOID LEUKEMIA NOT HAVING ACHIEVED REMISSION: Primary | ICD-10-CM

## 2023-08-17 DIAGNOSIS — C93.01 ACUTE MONOCYTIC LEUKEMIA IN REMISSION: ICD-10-CM

## 2023-08-17 LAB
ABO + RH BLD: NORMAL
ALBUMIN SERPL BCP-MCNC: 3.5 G/DL (ref 3.5–5.2)
ALP SERPL-CCNC: 110 U/L (ref 55–135)
ALT SERPL W/O P-5'-P-CCNC: 50 U/L (ref 10–44)
ANION GAP SERPL CALC-SCNC: 12 MMOL/L (ref 8–16)
ANISOCYTOSIS BLD QL SMEAR: ABNORMAL
AST SERPL-CCNC: 47 U/L (ref 10–40)
BASOPHILS NFR BLD: 2 % (ref 0–1.9)
BILIRUB SERPL-MCNC: 0.9 MG/DL (ref 0.1–1)
BLD GP AB SCN CELLS X3 SERPL QL: NORMAL
BUN SERPL-MCNC: 17 MG/DL (ref 8–23)
CALCIUM SERPL-MCNC: 9.6 MG/DL (ref 8.7–10.5)
CHLORIDE SERPL-SCNC: 104 MMOL/L (ref 95–110)
CO2 SERPL-SCNC: 22 MMOL/L (ref 23–29)
CREAT SERPL-MCNC: 1.3 MG/DL (ref 0.5–1.4)
DACRYOCYTES BLD QL SMEAR: ABNORMAL
DIFFERENTIAL METHOD: ABNORMAL
EOSINOPHIL NFR BLD: 1 % (ref 0–8)
ERYTHROCYTE [DISTWIDTH] IN BLOOD BY AUTOMATED COUNT: 16.2 % (ref 11.5–14.5)
EST. GFR  (NO RACE VARIABLE): 43.7 ML/MIN/1.73 M^2
GLUCOSE SERPL-MCNC: 131 MG/DL (ref 70–110)
HCT VFR BLD AUTO: 35.9 % (ref 37–48.5)
HGB BLD-MCNC: 11.3 G/DL (ref 12–16)
IMM GRANULOCYTES # BLD AUTO: ABNORMAL K/UL (ref 0–0.04)
IMM GRANULOCYTES NFR BLD AUTO: ABNORMAL % (ref 0–0.5)
LYMPHOCYTES NFR BLD: 38 % (ref 18–48)
MCH RBC QN AUTO: 29.9 PG (ref 27–31)
MCHC RBC AUTO-ENTMCNC: 31.5 G/DL (ref 32–36)
MCV RBC AUTO: 95 FL (ref 82–98)
METAMYELOCYTES NFR BLD MANUAL: 3 %
MONOCYTES NFR BLD: 7 % (ref 4–15)
MYELOCYTES NFR BLD MANUAL: 1 %
NEUTROPHILS NFR BLD: 42 % (ref 38–73)
NEUTS BAND NFR BLD MANUAL: 6 %
NRBC BLD-RTO: 4 /100 WBC
OVALOCYTES BLD QL SMEAR: ABNORMAL
PLATELET # BLD AUTO: 62 K/UL (ref 150–450)
PMV BLD AUTO: ABNORMAL FL (ref 9.2–12.9)
POIKILOCYTOSIS BLD QL SMEAR: SLIGHT
POLYCHROMASIA BLD QL SMEAR: ABNORMAL
POTASSIUM SERPL-SCNC: 4.1 MMOL/L (ref 3.5–5.1)
PROT SERPL-MCNC: 6.3 G/DL (ref 6–8.4)
RBC # BLD AUTO: 3.78 M/UL (ref 4–5.4)
SCHISTOCYTES BLD QL SMEAR: ABNORMAL
SMUDGE CELLS BLD QL SMEAR: PRESENT
SODIUM SERPL-SCNC: 138 MMOL/L (ref 136–145)
SPECIMEN OUTDATE: NORMAL
WBC # BLD AUTO: 1.86 K/UL (ref 3.9–12.7)

## 2023-08-17 PROCEDURE — 99213 OFFICE O/P EST LOW 20 MIN: CPT | Mod: PBBFAC | Performed by: INTERNAL MEDICINE

## 2023-08-17 PROCEDURE — 85027 COMPLETE CBC AUTOMATED: CPT | Performed by: INTERNAL MEDICINE

## 2023-08-17 PROCEDURE — 99999 PR PBB SHADOW E&M-EST. PATIENT-LVL III: CPT | Mod: PBBFAC,,, | Performed by: INTERNAL MEDICINE

## 2023-08-17 PROCEDURE — 86900 BLOOD TYPING SEROLOGIC ABO: CPT | Performed by: INTERNAL MEDICINE

## 2023-08-17 PROCEDURE — 36415 COLL VENOUS BLD VENIPUNCTURE: CPT | Performed by: INTERNAL MEDICINE

## 2023-08-17 PROCEDURE — 85007 BL SMEAR W/DIFF WBC COUNT: CPT | Performed by: INTERNAL MEDICINE

## 2023-08-17 PROCEDURE — 80053 COMPREHEN METABOLIC PANEL: CPT | Performed by: INTERNAL MEDICINE

## 2023-08-17 PROCEDURE — 99215 PR OFFICE/OUTPT VISIT, EST, LEVL V, 40-54 MIN: ICD-10-PCS | Mod: S$PBB,,, | Performed by: INTERNAL MEDICINE

## 2023-08-17 PROCEDURE — 99999 PR PBB SHADOW E&M-EST. PATIENT-LVL III: ICD-10-PCS | Mod: PBBFAC,,, | Performed by: INTERNAL MEDICINE

## 2023-08-17 PROCEDURE — 99215 OFFICE O/P EST HI 40 MIN: CPT | Mod: S$PBB,,, | Performed by: INTERNAL MEDICINE

## 2023-08-17 RX ORDER — METOPROLOL SUCCINATE 25 MG/1
25 TABLET, EXTENDED RELEASE ORAL DAILY
Qty: 30 TABLET | Refills: 11 | Status: SHIPPED | OUTPATIENT
Start: 2023-08-17

## 2023-08-17 RX ORDER — ONDANSETRON 8 MG/1
8 TABLET, ORALLY DISINTEGRATING ORAL 3 TIMES DAILY
COMMUNITY
Start: 2023-08-11

## 2023-08-17 NOTE — PROGRESS NOTES
Hematology and Medical Oncology   Follow Up Note     08/17/2023    Primary Oncologic Diagnosis: AML, FLT 3 + and NPMN1+    History of Present Ilness:   Sabrina Badillo) is a pleasant 73 y.o.female presents to clinic following 2 induction therapies for AML. She was in the hospital roughly 50 days to receive 7+3 and then FLAG JENNIFER. Now on active observation.    Oncology History:   68 y.o. female admitted overnight for possible new AML. Came in from OSH with elevated WBC of 100.   05/25/2018: Pt is now on hydrea 2 grams BID, allopurinol 300 mg daily, and IVF. Pt may need rasburicase this weekend. She will undergo a BM bx and aspiration today. She is an induction candidate and will not be screened for research trials. She has anxiety for which she usually takes xanax, this was re-started.   05/26/2018 PICC placed. Reports she slept well last night. Anxiety improved with prn xanax. EF 65%, HIV and Hep panel negative. 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  6/12/2018: Day 15 of 7+3 induction, restaging BM bx done yesterday. On Midostaurin. Fever yesterday and BC with gram + cocci in clusters. On cefepime day 2 and will start Vanc today. Labial mucositis improving.   6/13/2018: Day 16 7+3. BC with staph aureus, identification pending. Surveillance blood cultures done today. Afebrile x 48 hours. On cefepime and Vanc. Labial mucositis resolved. No complaints of pain. Nausea controlled. No diarrhea. Primary complaint is fatigue.   6/14/20018: Day 17 of 7+3. Day 14 bone marrow results pending. BC with staph epidermis only sensitive to Vancomycin. Vanc day 3 and cefepime day 4. Vanc trough 12.2 last night. BP remains low but stable. Afebrile x 72 hours.   6/15/2018: Day 18 of 7+3. Day 14 bone marrow biopsy shows persistent disease with 15% blasts. Discussion with patient regarding treatment and she is deciding if she wants to proceed with re-induction vs outpatient maintenance. Temp of 100.4 overnight, unsustained.  Day 5 Cefepime and Day 4 of Vanc for staph epidermis. Repeat cultures NGTD. BP improved. Electrolytes (mag, phos, K and calcium) replaced aggressively this am and will repeat labs this afternoon. No complaints this am.   06/16/2018: Day 19 of 7+3. Day 2 of Flag-JENNIFER. Remains afebrile. Calcium remains low and have increased PO supplementation. Remains on vanc and aztreonam. Somewhat loopy feeling after receiving benadryl.   06/17/2018: Day 20 of 7+3. Day 3 of FLAG-JENNIFER. Multiple electrolyte abnormalities. New bilateral leg and feet swelling.   6/18/2018: Day 21 of 7+3 and Day 4 of FLAG JENNIFER. Tolerating well with some nausea controlled with Zofran. VSS, afebrile. ANC 1900 on Neupogen. Continues Vanc for staph epi bacteremia. Multiple electrolytes replaced today. Complains of edema to lower extremities, not improved after 20 of lasix yesterday. No sob or CP.   6/19/18: Day 22 of 7+3 and Day 5 of FLAG JENNIFER. VSS, afebrile, ANC 3900. Vanc trough elevated and dose adjusted this am. Lower extremity edema improved after 40 of lasix yesterday, net negative 300 cc I&O. Mild nausea, no abdominal pain or diarrhea. Poor appetite but no difficulty eating or taking pills.   6/20/18: Day 23 of 7+3 and Day 6 of FLAG JENNIFER. Patient complains of nausea and vomiting overnight and this am, especially when taking pills. Will add Zyprexa daily in addition to Zofran, compazine, and ativan PRN and will change meds to IV. 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 Afebrile.  No diarrhea, mouth sores or pain.  06/21/2018: Day 24 of 7+3 and Day 7 of FLAG JENNIFER. Nausea better controlled. Continued on Vanc.  6/22/2018: Day 25 of 7+3 and Day 8 of FLAG JENNIFER. Nausea improved some with Zyprexa but did have 1 episode of emesis overnight.continuing meds IV due to vomiting. Remains afebrile. ANC 0. Continues Vanc and Flagyl. Electrolytes replaced.   06/23/2018 : day 26 of 7+3 and Day 9 of FLAG  JENNIFER.  Nausea persists, worsened after taking pills so meds converted to IV.  Encouraged ambulation.  Continue with flagyl for leptotrichia goodfellowii.  RUQ US showed intrahepatic dilation, overall impression hepatic steatosis.  CBD 0.5cm.  No Gallbladder.    06/24/2018 : day 27 of 7+3 and Day 10 of FLAG JENNIFER.  Nausea persisting, able to tolerate some po pills.  Last day of flagyl (day 5).  Still pancytopenic. Appetite still low as po intake triggers nausea.  06/25/2018: day 28 of 7+3 and Day 11 of FLAG JENNIFER. Afebrile, ANC 0. Has completed Flagyl. Will decrease Vanc to 1000 mg q12, trough 19.9, will complete Vanc on 6/27. Liver enzymes stable on Midostaurin. VSS.   6/26/2018: day 29 of 7+3 and day 12 of FLAG JENNIFER. Liver enzymes improved and Midostaurin increased to full dose 50 mg bid. Nausea controlled, afebrile, VSS, NEON.  6/27/2018: day 30 of 7+3 and Day 13 of FLAG JENNIFER. Persistent nausea and emesis x 1 yesterday. Compazine changed to scheduled. Vanc ends today. Afebrile, VSS. Aggressive electrolyte replacement, low electrolytes possibly due to Midostaurin.   6/28/2018: day 31 of 7+3 and Day 14 of FLAG JENNIFER. Nausea improved with scheduled Compazine. Patient has agreed to start converting some IV meds to po. VSS, afebrile, electrolytes wnl today. Only complains of fatigue, new rash noted to upper back and chest and legs, papular rash mildly red.  6/29/2018: Day 32 of 7+3 and Day 15 of FLAG JENNIFER. Day 14 restaging bone marrow biopsy done at bedside today. Patient states nausea improved but she did have emesis last night after taking 9 pm pills. Rash improved. No mouth sores, diarrhea or pain.   7/2/2018: Day 35 of 7+3 and Day 18 of FLAG JENNIFER. Restaging BM bx results pending. Fluid overload over the weekend. Chest x-ray with pulmonary edema. Os sats down to 88%. Placed on O2 at 2 L NC, off O2 this am. Received lasix. Net negative 2.7 L today. 1 episode of nausea, no emesis. Reports anxiety relieved with PRN meds.  Electrolytes improved, afebrile, VSS.   7/3/2018: Day 36 of 7+3 and Day 19 of FLAG JENNIFER. Restaging Bm bx with GABRIEL. Cardiology consulted for abnormal echo, EF 30% with pulmonary HTN and severe L atrial enlargement. EKG with T wave abnormality, possible anterolateral ischemia and prolonged QTc of 530. Medications adjusted. Nausea controlled, no diarrhea. Electrolytes improved. On O2 as needed.   07/04/2018 : Day 37 of 7+3 and Day 20 of FLAG JENNIFER Diarrhea in AM, watery, no associated abdominal pain, nausea, or vomiting.    07/05/2018: Day 38 of 7+3 ane Day 21 of FLAG JENNIFER. No CP or SOB, cardiology following. On RA. All meds changed to po, denies nausea or vomiting and no diarrhea. VSS, afebrile.   7/6/2018: Day 22 of FLAG JENNIFER. Continues to tolerate po meds with no nausea. Afebrile. Awaiting count recovery for discharge.  7/7/2018-/8/2018: Day 40/41 of 7+3, Day 23/24 of FLAG JENNIFER.  Improving clinically.  Started on mag oxide per patient request.  Labs showing signs of ANC recovery.  7/9/2018: Day 25 FLAG JENNIFER, , continues Neupogen. Received platelets today. Afebrile, VSS. Tolerating all oral meds with no nausea or vomiting. Only complains of fatigue.   7/10/2018: Day 26 FLAG JENNIFER,  today. Had 2 episodes of vomiting and diarrhea last night. Feeling better. Afebrile, VSS.   7/11/2018: Day 27 FLAG JENNIFER,  today. No vomiting or diarrhea overnight and no nausea. Afebrile, VSS. Complains of back pain (bone pain) suspect due to count recovery. Relieved with oxy IR and Zyrtec started.  07/12/2018: Day 28 flag jennifer, engrafted today with ANC of 730. Back pain improved with zyrtec. Scheduled for IT chemo tomorrow at 1:30 pm and discharge home thereafter. Will likely need plt transfusion prior to IT chemo tomorrow   7/13/2018: Day 29 of FLAG JENNIFER. ANC up to 1300 today. Transfusing platelets today prior to LP for IT chemo. Patient continues to complain of bone pain, mostly to back and legs. No nausea, diarrhea, sob.  Afebrile and VSS.   --hospitalized 7/23-7/25 for C.diff diarrhea, neutropenic fever.  While inpatient she developed pink blisters on her palm that were concerning for relapse.  --palm biopsy on 7/26/18 for suspicion of leukemia cutis was also negative for sign of relapse  --bone marrow biopsy 7/31/18 was negative for signs of relapse   --Admitted on 8/14/18 for HiDAC#1  --Admitted on 9/11/18 for HiDAC#2  --Admitted on 10/16/18 for HiDAC#3  --Post consolidation bone marrow biopsy on 12/27/2018: AML FISH (BM) Interpretation: The result is within normal limits for the AML FISH panel. No evidence of residual disease.  --Bone Marrow Biopsy on 5/9/19 NO MORPHOLOGIC EVIDENCE OF RESIDUAL ACUTE MYELOID LEUKEMIA. RECOMMEND NGS STUDY TO RULE OUT RESIDUAL DISEASE. deletion 20q  --Bone Marrow Biopsy on 9/23/2020: CELLULARITY=40-50%, TRILINEAGE HEMATOPOIETIC ACTIVITY (M/E= 0.6:1). ERYTHROID HYPERPLASIA, DYSERYTHROPOIESIS, AND INCREASED BLAST (11%). SEE COMMENT.  FOCAL GRADE 1 RETICULAR FIBROSIS.  --Decitabine + Venetoclax: C1D1: 10/12/2020, C2D1: 11/9  --Bone marrow biopsy on 2/9/2021: VARIABLY CELLULAR MARROW WITH DYSERYTHROPOIESIS, DYSMEGAKARYOPOIESIS AND  MARKED MYELOID HYPOPLASIA.NO MORPHOLOGIC EVIDENCE OF RESIDUAL ACUTE MYELOID LEUKEMIA.    Interval History:   Mrs. Mac is a 73 y.o. patient who presents with her      About 2 weeks ago started to have all over joint pain, relieved by oxy for 1 day. Then developed severe nausea, could not eat at all. Slept sitting up for 2 days.     Increasingly short of breath. Needs to be rolled on her walker the 35 feet from the bedroom to the den.    Called the ambulance 1 week ago, went to the ED and given 4 L iv fluids.    Previously took long term prednisone 10 mg and then increased to 20 mg daily for the last week for inflammatory response to her rheumatologic issues.         Past Medical History:   Past Medical History:   Diagnosis Date    Cancer 03/2018    AML    CHF  (congestive heart failure)     Diarrhea 9/14/2018    Encounter for blood transfusion        Current Medications:   Current Outpatient Medications   Medication Sig    acetaminophen (TYLENOL) 500 MG tablet Take 500 mg by mouth every 6 (six) hours as needed.    ALPRAZolam (XANAX) 2 MG Tab TAKE ONE TABLET BY MOUTH NIGHTLY AS NEEDED    ciprofloxacin HCl (CIPRO) 500 MG tablet Take 500 mg by mouth 2 (two) times daily.    EScitalopram oxalate (LEXAPRO) 20 MG tablet Take 1 tablet (20 mg total) by mouth every evening.    lactulose (CHRONULAC) 20 gram/30 mL Soln Take 23 mLs (15 g total) by mouth 3 (three) times daily as needed. (Patient not taking: Reported on 5/6/2021)    methocarbamoL (ROBAXIN) 500 MG Tab Take 1 tablet (500 mg total) by mouth 4 (four) times daily.    metoprolol succinate (TOPROL-XL) 25 MG 24 hr tablet TAKE ONE TABLET BY MOUTH DAILY    naloxone (NARCAN) 4 mg/actuation Spry 4mg by nasal route as needed for opioid overdose; may repeat every 2-3 minutes in alternating nostrils until medical help arrives. Call 911    omeprazole (PRILOSEC) 20 MG capsule Take 1 capsule (20 mg total) by mouth 2 (two) times daily before meals.    ondansetron (ZOFRAN) 8 MG tablet Take 1 tablet (8 mg total) by mouth every 12 (twelve) hours as needed for Nausea.    oxyCODONE (ROXICODONE) 10 mg Tab immediate release tablet Take 1 tablet (10 mg total) by mouth every 6 (six) hours as needed (pain).    oxyCODONE (ROXICODONE) 20 mg Tab immediate release tablet Take 1 tablet (20 mg total) by mouth every 6 (six) hours as needed (pain).    polyethylene glycol (GLYCOLAX) 17 gram PwPk Take 17 g by mouth once daily.    predniSONE (DELTASONE) 20 MG tablet Take 0.5 tablets (10 mg total) by mouth daily as needed (as needed).    senna-docusate 8.6-50 mg (PERICOLACE) 8.6-50 mg per tablet Take 1 tablet by mouth once daily. (Patient not taking: Reported on 5/6/2021)     No current facility-administered medications for this visit.      ALLERGIES:   Review of patient's allergies indicates:   Allergen Reactions    Methotrexate analogues      Elevated Liver Enzyme    Bactrim [sulfamethoxazole-trimethoprim] Nausea And Vomiting and Rash       Review of Systems:     Review of Systems   Constitutional: Positive for fatigue. Negative for appetite change, chills, diaphoresis, fever and unexpected weight change.   HENT:   Negative for hearing loss, mouth sores, nosebleeds, sore throat, trouble swallowing and voice change.    Eyes: Negative for eye problems and icterus.   Respiratory: Negative for chest tightness, cough, hemoptysis, shortness of breath and wheezing.    Cardiovascular: Negative for chest pain, leg swelling and palpitations.   Gastrointestinal: Negative for abdominal distention, abdominal pain, blood in stool, diarrhea, nausea and vomiting.   Endocrine: Negative for hot flashes.   Genitourinary: Negative for bladder incontinence, difficulty urinating, dysuria and hematuria.    Musculoskeletal: Positive for arthralgias and myalgias. Negative for back pain, flank pain, gait problem, neck pain and neck stiffness.   Skin: Negative for itching, rash and wound.   Neurological: Negative for dizziness, extremity weakness, gait problem, headaches, numbness, seizures and speech difficulty.   Hematological: Negative for adenopathy. Does not  bleed easily.   Psychiatric/Behavioral: Negative for confusion, depression and sleep disturbance. The patient is not nervous/anxious.        Physical Exam:     Vitals:    08/17/23 1350   BP: 122/68   Pulse: 96   Resp: 16   Temp: 98.5 °F (36.9 °C)         Constitutional:       Appearance: She is well-developed.   HENT:      Head: Normocephalic and atraumatic.      Right Ear: External ear normal.      Left Ear: External ear normal.      Mouth/Throat:      Pharynx: No posterior oropharyngeal erythema.   Eyes:      Conjunctiva/sclera: Conjunctivae normal.   Cardiovascular:      Rate and Rhythm: Normal rate and  regular rhythm.      Heart sounds: Normal heart sounds. No murmur heard.     Pulmonary:      Effort: Pulmonary effort is normal. No respiratory distress.      Breath sounds: Normal breath sounds. No stridor. No wheezing or rales.   Abdominal:      General: Bowel sounds are normal. There is no distension.      Palpations: Abdomen is soft.      Tenderness: There is no abdominal tenderness.   Musculoskeletal:         General: Normal range of motion.      Cervical back: Normal range of motion.      Right lower leg: No edema.      Left lower leg: No edema.   Skin:     General: Skin is warm and dry.      Findings: No rash.   Neurological:      Mental Status: She is alert and oriented to person, place, and time.   Psychiatric:         Behavior: Behavior normal.         Thought Content: Thought content normal.         Judgment: Judgment normal.       ECOG Performance Status: (foot note - ECOG PS provided by Eastern Cooperative Oncology Group) 2 - Symptomatic, <50% confined to bed    Karnofsky Performance Score:  80%- Normal Activity with Effort: Some Symptoms of Disease    Labs:   Lab Results   Component Value Date    WBC 1.86 (LL) 08/17/2023    HGB 11.3 (L) 08/17/2023    HCT 35.9 (L) 08/17/2023    PLT 62 (L) 08/17/2023    ALT 50 (H) 08/17/2023    AST 47 (H) 08/17/2023     08/17/2023    K 4.1 08/17/2023     08/17/2023    CREATININE 1.3 08/17/2023    BUN 17 08/17/2023    CO2 22 (L) 08/17/2023    TSH 1.913 10/17/2019    INR 0.9 01/31/2021    HGBA1C 6.0 (H) 01/31/2021       Imaging: previous imaging has been reviewed    Assessment and Plan:     Mrs. Mac is a 73 year old female with AML in second remission.     AML  -- Ad[facility] early AM with WBC around 100s, for suspected new non-M3 AML  --lo res HLA typing on 5/26/18 and hi res on 5/27/18 done in anticipation of possible need for future transplant   --Pt with two daughters, two full sisters (in their mid 60s, one with brain  aneurysm hx, other one without any medical issues), and one full brother (61 y/o healthy).  --NPM1 +, CEBPA (-), FLT3 (+).  On Midostaurin 50 mg PO BID until Day 14 (held starting 6/8 to 6/11). Stopped when FLAG JENNIFER re-induction started. Restarted Midostaurin at 25 mg bid on day 8 of FLAG JENNIFER induction (6/22), increase to full dose 50 mg bid (6/26) as improvement in liver enzymes. Stopped 7/3/18 due to abnormal cardiac echo.  --day 14 bone marrow biopsy completed 6/11 showing persistent disease with 15% CD 34 positive blasts  --Day 60 of FLAG-JENNIFER, tolerating without difficulty except nausea/vomiting  --day 14 restaging bone marrow biopsy done 6/29 without complication, results with no evidence of AML, FLT 3 negative, will need repeat marrow at count recovery outpatient   --recovery marrow showed no evidence of disease  --HiDAC #1 on 8/14/18, HiDAC#2 on 9/11/18  --Cycle 3 delayed 1 week due to pancytopenia. Started on 10/16/18  --recovery bone marrow showed no signs of residual leukemia  --Bone marrow biopsy done last week reveals early relapse. 11% blasts. We discussed the possibility of re-induction, but she is adamantly against. She does not want to be admitted to the hospital.  --Plan for single agent Gilteritinib 120mg has changed based on next gen sequencing that reports FLT-3 negative at this time  --Completed 4 cycles of Decitabine + Venetoclax  --Most recent marrow shows no evidence of disease  --Will continue with supportive care  --Continue to follow monthly labs    Neutropenia  --Evolving over the last several weeks  --Likely due to acute illness  --No others or blasts seen on differential  --Will continue to monitor with a low threshold to do flow cytometry or bone marrow biopsy to fully assess state of disease      Abnormal Liver Function Tests  --ALT and AST and Alk phos have again increased substantially since re-starting midostaurin.   --midostaurin started day 8 at 25 mg bid, monitoring liver  enzymes closely and improved. Increasing Midostaurin to 50 mg bid 6/26. Stopped Midostaurin (7/3) due to new diagnosis of systolic heart failure EF 35%.    --permanently discontinued midostaurin   --LFTs are now wnl    Chemotherapy induced cardiomyopathy  --cardiology follow-up as outpatient in 3 months  --repeat 2D echo on 6/15 with preserved EF of 60%.  However 7/2/18 echo with reduced EF 30-35%  --Echo on 8/15/18 was within normal range EF of 60-65%    Rheumatologic issues  --Previously on steroids and methotrexate  --Now on single agent low dose steroids [she self adjusts this medication]  --previously worked with [facility] rheumatology      30 minutes were spent face to face with the patient and her family to discuss the disease, natural history, treatment options and survival statistics. I have provided the patient with an opportunity to ask questions and have all questions answered to her satisfaction.      she will return to the clinic in 4 weeks, but knows to call in the interim if symptoms change or should a problem arise.        Laquita Troy MD  Hematology and Medical Oncology  Bone Marrow Transplant  [facility]             BMT Chart Routing      Follow up with physician 4 weeks. 1. see me in 1 month [likes 1pm appts] with cbc,cmp   Follow up with SHORTY    Provider visit type    Infusion scheduling note    Injection scheduling note    Labs    Imaging    Pharmacy appointment    Other referrals

## 2023-08-22 DIAGNOSIS — M19.90 ARTHRITIS: ICD-10-CM

## 2023-08-22 RX ORDER — PREDNISONE 20 MG/1
20 TABLET ORAL
Qty: 30 TABLET | Refills: 3 | Status: SHIPPED | OUTPATIENT
Start: 2023-08-22 | End: 2024-01-24 | Stop reason: SDUPTHER

## 2023-08-24 ENCOUNTER — LAB VISIT (OUTPATIENT)
Dept: PRIMARY CARE CLINIC | Facility: CLINIC | Age: 73
End: 2023-08-24
Payer: MEDICARE

## 2023-08-24 DIAGNOSIS — C92.01 ACUTE MYELOID LEUKEMIA IN REMISSION: ICD-10-CM

## 2023-08-24 LAB
ALBUMIN SERPL BCP-MCNC: 3.6 G/DL (ref 3.5–5.2)
ALP SERPL-CCNC: 90 U/L (ref 55–135)
ALT SERPL W/O P-5'-P-CCNC: 56 U/L (ref 10–44)
ANION GAP SERPL CALC-SCNC: 11 MMOL/L (ref 8–16)
ANISOCYTOSIS BLD QL SMEAR: SLIGHT
AST SERPL-CCNC: 37 U/L (ref 10–40)
BASO STIPL BLD QL SMEAR: ABNORMAL
BASOPHILS NFR BLD: 0 % (ref 0–1.9)
BILIRUB SERPL-MCNC: 0.8 MG/DL (ref 0.1–1)
BUN SERPL-MCNC: 15 MG/DL (ref 8–23)
CALCIUM SERPL-MCNC: 9.5 MG/DL (ref 8.7–10.5)
CHLORIDE SERPL-SCNC: 105 MMOL/L (ref 95–110)
CO2 SERPL-SCNC: 26 MMOL/L (ref 23–29)
CREAT SERPL-MCNC: 1.2 MG/DL (ref 0.5–1.4)
DACRYOCYTES BLD QL SMEAR: ABNORMAL
DIFFERENTIAL METHOD: ABNORMAL
EOSINOPHIL NFR BLD: 0 % (ref 0–8)
ERYTHROCYTE [DISTWIDTH] IN BLOOD BY AUTOMATED COUNT: 17.6 % (ref 11.5–14.5)
EST. GFR  (NO RACE VARIABLE): 48.1 ML/MIN/1.73 M^2
GIANT PLATELETS BLD QL SMEAR: PRESENT
GLUCOSE SERPL-MCNC: 105 MG/DL (ref 70–110)
HCT VFR BLD AUTO: 35 % (ref 37–48.5)
HGB BLD-MCNC: 10.7 G/DL (ref 12–16)
HYPOCHROMIA BLD QL SMEAR: ABNORMAL
IMM GRANULOCYTES # BLD AUTO: ABNORMAL K/UL (ref 0–0.04)
IMM GRANULOCYTES NFR BLD AUTO: ABNORMAL % (ref 0–0.5)
LYMPHOCYTES NFR BLD: 51 % (ref 18–48)
MCH RBC QN AUTO: 30.1 PG (ref 27–31)
MCHC RBC AUTO-ENTMCNC: 30.6 G/DL (ref 32–36)
MCV RBC AUTO: 98 FL (ref 82–98)
METAMYELOCYTES NFR BLD MANUAL: 1 %
MONOCYTES NFR BLD: 14 % (ref 4–15)
MYELOCYTES NFR BLD MANUAL: 1 %
NEUTROPHILS NFR BLD: 31 % (ref 38–73)
NEUTS BAND NFR BLD MANUAL: 2 %
NRBC BLD-RTO: 3 /100 WBC
OVALOCYTES BLD QL SMEAR: ABNORMAL
PLATELET # BLD AUTO: 72 K/UL (ref 150–450)
PLATELET BLD QL SMEAR: ABNORMAL
PMV BLD AUTO: 13.2 FL (ref 9.2–12.9)
POIKILOCYTOSIS BLD QL SMEAR: SLIGHT
POLYCHROMASIA BLD QL SMEAR: ABNORMAL
POTASSIUM SERPL-SCNC: 4.3 MMOL/L (ref 3.5–5.1)
PROT SERPL-MCNC: 5.8 G/DL (ref 6–8.4)
RBC # BLD AUTO: 3.56 M/UL (ref 4–5.4)
SCHISTOCYTES BLD QL SMEAR: ABNORMAL
SMUDGE CELLS BLD QL SMEAR: PRESENT
SODIUM SERPL-SCNC: 142 MMOL/L (ref 136–145)
WBC # BLD AUTO: 3.4 K/UL (ref 3.9–12.7)

## 2023-08-24 PROCEDURE — 36415 PR COLLECTION VENOUS BLOOD,VENIPUNCTURE: ICD-10-PCS | Mod: S$GLB,,, | Performed by: INTERNAL MEDICINE

## 2023-08-24 PROCEDURE — 85007 BL SMEAR W/DIFF WBC COUNT: CPT | Performed by: INTERNAL MEDICINE

## 2023-08-24 PROCEDURE — 80053 COMPREHEN METABOLIC PANEL: CPT | Performed by: INTERNAL MEDICINE

## 2023-08-24 PROCEDURE — 85027 COMPLETE CBC AUTOMATED: CPT | Performed by: INTERNAL MEDICINE

## 2023-08-24 PROCEDURE — 36415 COLL VENOUS BLD VENIPUNCTURE: CPT | Mod: S$GLB,,, | Performed by: INTERNAL MEDICINE

## 2023-09-05 DIAGNOSIS — C92.02 ACUTE MYELOID LEUKEMIA IN RELAPSE: ICD-10-CM

## 2023-09-05 RX ORDER — ALPRAZOLAM 2 MG/1
TABLET ORAL
Qty: 60 TABLET | Refills: 0 | Status: SHIPPED | OUTPATIENT
Start: 2023-09-05 | End: 2023-12-20 | Stop reason: SDUPTHER

## 2023-09-11 DIAGNOSIS — G89.3 CANCER RELATED PAIN: ICD-10-CM

## 2023-09-12 RX ORDER — OXYCODONE HYDROCHLORIDE 10 MG/1
10 TABLET ORAL EVERY 6 HOURS PRN
Qty: 60 TABLET | Refills: 0 | Status: SHIPPED | OUTPATIENT
Start: 2023-09-12

## 2023-09-13 NOTE — PROGRESS NOTES
[facility]  Hematology  Bone Marrow Transplant  Progress Note    Patient Name: Sabrina Mac  Admission Date: 10/16/2018  Hospital Length of Stay: 2 days  Code Status: Full Code    Subjective:     Interval History:   Tonight will be day 2 of HIDAC. Slept well, no issues overnight.    Objective:     Vital Signs (Most Recent):  Temp: 97.3 °F (36.3 °C) (10/18/18 0400)  Pulse: 62 (10/18/18 0400)  Resp: 18 (10/18/18 0400)  BP: 123/60 (10/18/18 0400)  SpO2: 96 % (10/18/18 0000) Vital Signs (24h Range):  Temp:  [96.2 °F (35.7 °C)-98.4 °F (36.9 °C)] 97.3 °F (36.3 °C)  Pulse:  [62-76] 62  Resp:  [16-18] 18  SpO2:  [96 %-97 %] 96 %  BP: (114-123)/(55-65) 123/60     Weight: 64.5 kg (142 lb 3.2 oz)  Body mass index is 25.19 kg/m².  Body surface area is 1.69 meters squared.    Intake/Output - Last 3 Shifts       10/16 0700 - 10/17 0659 10/17 0700 - 10/18 0659 10/18 0700 - 10/19 0659    P.O. 360 960     I.V. (mL/kg) 602 (9.4) 1186.7 (18.4)     IV Piggyback 250 250     Total Intake(mL/kg) 1212 (18.9) 2396.7 (37.2)     Urine (mL/kg/hr) 800 1400 (0.9)     Total Output 800 1400     Net +412 +996.7            Urine Occurrence  3 x     Stool Occurrence  1 x         Physical Exam   Constitutional: She is oriented to person, place, and time. Vital signs are normal. She is cooperative.   HENT:   Head: Normocephalic.   Mouth/Throat: No oral lesions (none noted).   Eyes: Lids are normal.   Neck: Trachea normal and normal range of motion.   Cardiovascular: Normal rate, regular rhythm, S1 normal, S2 normal and normal heart sounds.   Pulmonary/Chest: Effort normal and breath sounds normal.   Abdominal: Soft. Normal appearance and bowel sounds are normal.   Musculoskeletal: Normal range of motion.   Neurological: She is alert and oriented to person, place, and time. Coordination and gait normal.   Skin: Skin is dry and intact. She is not diaphoretic. No pallor.   Psychiatric: She has a normal mood and affect. Her speech  is normal and behavior is normal. Judgment and thought content normal. Cognition and memory are normal.     Significant Labs:   CBC:   Recent Labs   Lab 10/16/18  1011 10/17/18  0452 10/18/18  0414   WBC 2.97* 2.12* 3.93   HGB 8.7* 8.7* 7.4*   HCT 26.9* 25.2* 22.0*   PLT 80* 78* 73*    and CMP:   Recent Labs   Lab 10/16/18  1012 10/17/18  0452 10/18/18  0414    139 141   K 4.0 4.5 4.4    107 110   CO2 23 24 24   * 140* 127*   BUN 16 15 20   CREATININE 0.8 0.8 0.8   CALCIUM 10.5 10.1 10.1   PROT 6.6 6.1 5.8*   ALBUMIN 3.5 3.1* 3.1*   BILITOT 0.3 0.3 0.4   ALKPHOS 138* 127 121   AST 20 27 24   ALT 15 21 25   ANIONGAP 10 8 7*   EGFRNONAA >60.0 >60.0 >60.0     Diagnostic Results:  I have reviewed all pertinent imaging results/findings within the past 24 hours.    Assessment/Plan:     * AML (acute myeloblastic leukemia)    - Admitted from [facility] in past for new AML; NPM1 +, CEBPA (-), FLT3 (+)  - Received 7+3 and then FLAG JENNIFER for induction and 2nd induction due to residual disease. Recovery marrow without disease  - Hand rash thought to be leukemia cutis resolved, skin bx did not show cutis   - On midostaurin off and on due to side effects (elevated LFTs) and abnormal cardiac echo; plan upon discharge is attempting to retrial midostaurin per Dr. Troy  - HIDAC #1 on 8/14/18, HIDAC #2 on 9/11/18, HIDAC #3 delayed due to pancytopenia and now on 10/16/18. Tonight will be day 3 of cycle 3.  - Was deemed okay to proceed with therapy with platelet count of 80k, as risk out weighs benefit of further treatment delay per primary oncologist Dr. Troy  - Will d/c Sunday if stable after chemo with dex eye drops and antimicrobial ppx, and dose reduced midostaurin (on day 8)     Pancytopenia due to chemotherapy    - transfuse for hgb <7 and plt <10K  - WBC and ANC normal, hgb 7.4, plt 73K  - stop ppx lovenox if plt <50K  - d/c on antimicrobial ppx     CINV (chemotherapy-induced nausea and vomiting)    -  continue prn antiemetics from home at this time       Mucositis due to chemotherapy    - history of after chemo  - will continue home dukes solution       Chemotherapy induced cardiomyopathy    - post induction, now resolved  - last echo with improved EF       History of ventricular fibrillation    - continue home metoprolol 12.5 mg at 4:30 pm daily per pt request       Elevated LFTs    - history of elevated LFTs 2/2 midostaurin  - intermittently holding midostaurin  - plan to retrial after this cycle per Dr. Troy at day 8 with reduced dose  - LFTs normal at this time      Electrolyte abnormality    - replace per BMT electrolyte protocol should electrolyte become abnormal  - electrolytes normal today      Depression with anxiety    - continue home lexapro  - continue home xanax and ativan prn           VTE Risk Mitigation (From admission, onward)        Ordered     enoxaparin injection 40 mg  Daily      10/16/18 1403     heparin, porcine (PF) 100 unit/mL injection flush 300 Units  As needed (PRN)      10/16/18 1610     IP VTE HIGH RISK PATIENT  Once      10/16/18 1403          Disposition: plan to d/c Sunday if stable after chemotherapy     Norma Berg NP  Bone Marrow Transplant  [facility]       Yes

## 2023-09-14 ENCOUNTER — HOSPITAL ENCOUNTER (OUTPATIENT)
Dept: RADIOLOGY | Facility: HOSPITAL | Age: 73
Discharge: HOME | End: 2023-09-14
Attending: INTERNAL MEDICINE
Payer: MEDICARE

## 2023-09-14 ENCOUNTER — OFFICE VISIT (OUTPATIENT)
Dept: HEMATOLOGY/ONCOLOGY | Facility: CLINIC | Age: 73
End: 2023-09-14
Payer: MEDICARE

## 2023-09-14 VITALS
BODY MASS INDEX: 33.45 KG/M2 | HEART RATE: 74 BPM | HEIGHT: 64 IN | TEMPERATURE: 98 F | OXYGEN SATURATION: 96 % | SYSTOLIC BLOOD PRESSURE: 118 MMHG | DIASTOLIC BLOOD PRESSURE: 62 MMHG

## 2023-09-14 DIAGNOSIS — S09.90XA INJURY OF HEAD, INITIAL ENCOUNTER: Primary | ICD-10-CM

## 2023-09-14 DIAGNOSIS — R79.89 ELEVATED LFTS: ICD-10-CM

## 2023-09-14 DIAGNOSIS — T45.1X5A CINV (CHEMOTHERAPY-INDUCED NAUSEA AND VOMITING): ICD-10-CM

## 2023-09-14 DIAGNOSIS — R11.0 NAUSEA: ICD-10-CM

## 2023-09-14 DIAGNOSIS — S09.90XA INJURY OF HEAD, INITIAL ENCOUNTER: ICD-10-CM

## 2023-09-14 DIAGNOSIS — S09.90XA HEAD TRAUMA, INITIAL ENCOUNTER: ICD-10-CM

## 2023-09-14 DIAGNOSIS — S72.001A CLOSED DISPLACED FRACTURE OF RIGHT FEMORAL NECK: ICD-10-CM

## 2023-09-14 DIAGNOSIS — R11.2 CINV (CHEMOTHERAPY-INDUCED NAUSEA AND VOMITING): ICD-10-CM

## 2023-09-14 DIAGNOSIS — C93.01 ACUTE MONOCYTIC LEUKEMIA IN REMISSION: ICD-10-CM

## 2023-09-14 DIAGNOSIS — S09.90XA TRAUMATIC HEAD INJURY LESS THAN 3 MONTHS AGO, INITIAL ENCOUNTER: ICD-10-CM

## 2023-09-14 PROCEDURE — 99999 PR PBB SHADOW E&M-EST. PATIENT-LVL III: CPT | Mod: PBBFAC,,, | Performed by: INTERNAL MEDICINE

## 2023-09-14 PROCEDURE — 70450 CT HEAD/BRAIN W/O DYE: CPT | Mod: TC

## 2023-09-14 PROCEDURE — 70450 CT HEAD WITHOUT CONTRAST: ICD-10-PCS | Mod: 26,,, | Performed by: RADIOLOGY

## 2023-09-14 PROCEDURE — 70450 CT HEAD/BRAIN W/O DYE: CPT | Mod: 26,,, | Performed by: RADIOLOGY

## 2023-09-14 PROCEDURE — 99999 PR PBB SHADOW E&M-EST. PATIENT-LVL III: ICD-10-PCS | Mod: PBBFAC,,, | Performed by: INTERNAL MEDICINE

## 2023-09-14 PROCEDURE — 99215 PR OFFICE/OUTPT VISIT, EST, LEVL V, 40-54 MIN: ICD-10-PCS | Mod: S$PBB,,, | Performed by: INTERNAL MEDICINE

## 2023-09-14 PROCEDURE — 99215 OFFICE O/P EST HI 40 MIN: CPT | Mod: S$PBB,,, | Performed by: INTERNAL MEDICINE

## 2023-09-14 PROCEDURE — 99213 OFFICE O/P EST LOW 20 MIN: CPT | Mod: PBBFAC,25 | Performed by: INTERNAL MEDICINE

## 2023-09-14 RX ORDER — ONDANSETRON HYDROCHLORIDE 8 MG/1
8 TABLET, FILM COATED ORAL EVERY 12 HOURS PRN
Qty: 60 TABLET | Refills: 3 | Status: SHIPPED | OUTPATIENT
Start: 2023-09-14

## 2023-09-14 NOTE — PROGRESS NOTES
Hematology and Medical Oncology   Follow Up Note     09/14/2023    Primary Oncologic Diagnosis: AML, FLT 3 + and NPMN1+    History of Present Ilness:   Sabrina Badillo) is a pleasant 73 y.o.female presents to clinic following 2 induction therapies for AML. She was in the hospital roughly 50 days to receive 7+3 and then FLAG JENNIFER. Now on active observation.    Oncology History:   68 y.o. female admitted overnight for possible new AML. Came in from OSH with elevated WBC of 100.   05/25/2018: Pt is now on hydrea 2 grams BID, allopurinol 300 mg daily, and IVF. Pt may need rasburicase this weekend. She will undergo a BM bx and aspiration today. She is an induction candidate and will not be screened for research trials. She has anxiety for which she usually takes xanax, this was re-started.   05/26/2018 PICC placed. Reports she slept well last night. Anxiety improved with prn xanax. EF 65%, HIV and Hep panel negative. 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  6/12/2018: Day 15 of 7+3 induction, restaging BM bx done yesterday. On Midostaurin. Fever yesterday and BC with gram + cocci in clusters. On cefepime day 2 and will start Vanc today. Labial mucositis improving.   6/13/2018: Day 16 7+3. BC with staph aureus, identification pending. Surveillance blood cultures done today. Afebrile x 48 hours. On cefepime and Vanc. Labial mucositis resolved. No complaints of pain. Nausea controlled. No diarrhea. Primary complaint is fatigue.   6/14/20018: Day 17 of 7+3. Day 14 bone marrow results pending. BC with staph epidermis only sensitive to Vancomycin. Vanc day 3 and cefepime day 4. Vanc trough 12.2 last night. BP remains low but stable. Afebrile x 72 hours.   6/15/2018: Day 18 of 7+3. Day 14 bone marrow biopsy shows persistent disease with 15% blasts. Discussion with patient regarding treatment and she is deciding if she wants to proceed with re-induction vs outpatient maintenance. Temp of 100.4 overnight, unsustained.  Day 5 Cefepime and Day 4 of Vanc for staph epidermis. Repeat cultures NGTD. BP improved. Electrolytes (mag, phos, K and calcium) replaced aggressively this am and will repeat labs this afternoon. No complaints this am.   06/16/2018: Day 19 of 7+3. Day 2 of Flag-JENNIFER. Remains afebrile. Calcium remains low and have increased PO supplementation. Remains on vanc and aztreonam. Somewhat loopy feeling after receiving benadryl.   06/17/2018: Day 20 of 7+3. Day 3 of FLAG-JENNIFER. Multiple electrolyte abnormalities. New bilateral leg and feet swelling.   6/18/2018: Day 21 of 7+3 and Day 4 of FLAG JENNIFER. Tolerating well with some nausea controlled with Zofran. VSS, afebrile. ANC 1900 on Neupogen. Continues Vanc for staph epi bacteremia. Multiple electrolytes replaced today. Complains of edema to lower extremities, not improved after 20 of lasix yesterday. No sob or CP.   6/19/18: Day 22 of 7+3 and Day 5 of FLAG JENNIFER. VSS, afebrile, ANC 3900. Vanc trough elevated and dose adjusted this am. Lower extremity edema improved after 40 of lasix yesterday, net negative 300 cc I&O. Mild nausea, no abdominal pain or diarrhea. Poor appetite but no difficulty eating or taking pills.   6/20/18: Day 23 of 7+3 and Day 6 of FLAG JENNIFER. Patient complains of nausea and vomiting overnight and this am, especially when taking pills. Will add Zyprexa daily in addition to Zofran, compazine, and ativan PRN and will change meds to IV. 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 Afebrile.  No diarrhea, mouth sores or pain.  06/21/2018: Day 24 of 7+3 and Day 7 of FLAG JENNIFER. Nausea better controlled. Continued on Vanc.  6/22/2018: Day 25 of 7+3 and Day 8 of FLAG JENNIFER. Nausea improved some with Zyprexa but did have 1 episode of emesis overnight.continuing meds IV due to vomiting. Remains afebrile. ANC 0. Continues Vanc and Flagyl. Electrolytes replaced.   06/23/2018 : day 26 of 7+3 and Day 9 of FLAG  JENNIFER.  Nausea persists, worsened after taking pills so meds converted to IV.  Encouraged ambulation.  Continue with flagyl for leptotrichia goodfellowii.  RUQ US showed intrahepatic dilation, overall impression hepatic steatosis.  CBD 0.5cm.  No Gallbladder.    06/24/2018 : day 27 of 7+3 and Day 10 of FLAG JENNIFER.  Nausea persisting, able to tolerate some po pills.  Last day of flagyl (day 5).  Still pancytopenic. Appetite still low as po intake triggers nausea.  06/25/2018: day 28 of 7+3 and Day 11 of FLAG JENNIFER. Afebrile, ANC 0. Has completed Flagyl. Will decrease Vanc to 1000 mg q12, trough 19.9, will complete Vanc on 6/27. Liver enzymes stable on Midostaurin. VSS.   6/26/2018: day 29 of 7+3 and day 12 of FLAG JENNIFER. Liver enzymes improved and Midostaurin increased to full dose 50 mg bid. Nausea controlled, afebrile, VSS, NEON.  6/27/2018: day 30 of 7+3 and Day 13 of FLAG JENNIFER. Persistent nausea and emesis x 1 yesterday. Compazine changed to scheduled. Vanc ends today. Afebrile, VSS. Aggressive electrolyte replacement, low electrolytes possibly due to Midostaurin.   6/28/2018: day 31 of 7+3 and Day 14 of FLAG JENNIFER. Nausea improved with scheduled Compazine. Patient has agreed to start converting some IV meds to po. VSS, afebrile, electrolytes wnl today. Only complains of fatigue, new rash noted to upper back and chest and legs, papular rash mildly red.  6/29/2018: Day 32 of 7+3 and Day 15 of FLAG JENINFER. Day 14 restaging bone marrow biopsy done at bedside today. Patient states nausea improved but she did have emesis last night after taking 9 pm pills. Rash improved. No mouth sores, diarrhea or pain.   7/2/2018: Day 35 of 7+3 and Day 18 of FLAG JENNIFER. Restaging BM bx results pending. Fluid overload over the weekend. Chest x-ray with pulmonary edema. Os sats down to 88%. Placed on O2 at 2 L NC, off O2 this am. Received lasix. Net negative 2.7 L today. 1 episode of nausea, no emesis. Reports anxiety relieved with PRN meds.  Electrolytes improved, afebrile, VSS.   7/3/2018: Day 36 of 7+3 and Day 19 of FLAG JENNIFER. Restaging Bm bx with GABRIEL. Cardiology consulted for abnormal echo, EF 30% with pulmonary HTN and severe L atrial enlargement. EKG with T wave abnormality, possible anterolateral ischemia and prolonged QTc of 530. Medications adjusted. Nausea controlled, no diarrhea. Electrolytes improved. On O2 as needed.   07/04/2018 : Day 37 of 7+3 and Day 20 of FLAG JENNIFER Diarrhea in AM, watery, no associated abdominal pain, nausea, or vomiting.    07/05/2018: Day 38 of 7+3 ane Day 21 of FLAG JENNIFER. No CP or SOB, cardiology following. On RA. All meds changed to po, denies nausea or vomiting and no diarrhea. VSS, afebrile.   7/6/2018: Day 22 of FLAG JENNIFER. Continues to tolerate po meds with no nausea. Afebrile. Awaiting count recovery for discharge.  7/7/2018-/8/2018: Day 40/41 of 7+3, Day 23/24 of FLAG JENNIFER.  Improving clinically.  Started on mag oxide per patient request.  Labs showing signs of ANC recovery.  7/9/2018: Day 25 FLAG JENNIFER, , continues Neupogen. Received platelets today. Afebrile, VSS. Tolerating all oral meds with no nausea or vomiting. Only complains of fatigue.   7/10/2018: Day 26 FLAG JENNIFER,  today. Had 2 episodes of vomiting and diarrhea last night. Feeling better. Afebrile, VSS.   7/11/2018: Day 27 FLAG JENNIFER,  today. No vomiting or diarrhea overnight and no nausea. Afebrile, VSS. Complains of back pain (bone pain) suspect due to count recovery. Relieved with oxy IR and Zyrtec started.  07/12/2018: Day 28 flag jennifer, engrafted today with ANC of 730. Back pain improved with zyrtec. Scheduled for IT chemo tomorrow at 1:30 pm and discharge home thereafter. Will likely need plt transfusion prior to IT chemo tomorrow   7/13/2018: Day 29 of FLAG JENNIFER. ANC up to 1300 today. Transfusing platelets today prior to LP for IT chemo. Patient continues to complain of bone pain, mostly to back and legs. No nausea, diarrhea, sob.  Afebrile and VSS.   --hospitalized 7/23-7/25 for C.diff diarrhea, neutropenic fever.  While inpatient she developed pink blisters on her palm that were concerning for relapse.  --palm biopsy on 7/26/18 for suspicion of leukemia cutis was also negative for sign of relapse  --bone marrow biopsy 7/31/18 was negative for signs of relapse   --Admitted on 8/14/18 for HiDAC#1  --Admitted on 9/11/18 for HiDAC#2  --Admitted on 10/16/18 for HiDAC#3  --Post consolidation bone marrow biopsy on 12/27/2018: AML FISH (BM) Interpretation: The result is within normal limits for the AML FISH panel. No evidence of residual disease.  --Bone Marrow Biopsy on 5/9/19 NO MORPHOLOGIC EVIDENCE OF RESIDUAL ACUTE MYELOID LEUKEMIA. RECOMMEND NGS STUDY TO RULE OUT RESIDUAL DISEASE. deletion 20q  --Bone Marrow Biopsy on 9/23/2020: CELLULARITY=40-50%, TRILINEAGE HEMATOPOIETIC ACTIVITY (M/E= 0.6:1). ERYTHROID HYPERPLASIA, DYSERYTHROPOIESIS, AND INCREASED BLAST (11%). SEE COMMENT.  FOCAL GRADE 1 RETICULAR FIBROSIS.  --Decitabine + Venetoclax: C1D1: 10/12/2020, C2D1: 11/9  --Bone marrow biopsy on 2/9/2021: VARIABLY CELLULAR MARROW WITH DYSERYTHROPOIESIS, DYSMEGAKARYOPOIESIS AND  MARKED MYELOID HYPOPLASIA.NO MORPHOLOGIC EVIDENCE OF RESIDUAL ACUTE MYELOID LEUKEMIA.    Interval History:   Mrs. Mac is a 73 y.o. patient who presents with her . She is in a wheel chair today. Since our last visit she was generally feeling very good. Doing more around the house. Until she was carrying a heavy vase and lost her balance over the weekend.     She fell on Saturday from bathroom to closet. Both are tile floor. As she was falling she covered the hip fracture scar to protect it, but hit her head.     Significant bruising on the right side of her body. Reportedly did not loose consciousness.     Past Medical History:   Past Medical History:   Diagnosis Date    Cancer 03/2018    AML    CHF (congestive heart failure)     Diarrhea 9/14/2018    Encounter for  blood transfusion        Current Medications:   Current Outpatient Medications   Medication Sig    ALPRAZolam (XANAX) 2 MG Tab TAKE ONE TABLET BY MOUTH NIGHTLY AS NEEDED    EScitalopram oxalate (LEXAPRO) 20 MG tablet Take 1 tablet (20 mg total) by mouth every evening.    methocarbamoL (ROBAXIN) 500 MG Tab Take 1 tablet (500 mg total) by mouth 4 (four) times daily.    metoprolol succinate (TOPROL-XL) 25 MG 24 hr tablet Take 1 tablet (25 mg total) by mouth once daily.    omeprazole (PRILOSEC) 20 MG capsule Take 1 capsule (20 mg total) by mouth 2 (two) times daily before meals.    ondansetron (ZOFRAN) 8 MG tablet Take 1 tablet (8 mg total) by mouth every 12 (twelve) hours as needed for Nausea.    ondansetron (ZOFRAN-ODT) 8 MG TbDL Take 8 mg by mouth 3 (three) times daily.    oxyCODONE (ROXICODONE) 10 mg Tab immediate release tablet Take 1 tablet (10 mg total) by mouth every 6 (six) hours as needed (pain).    oxyCODONE (ROXICODONE) 20 mg Tab immediate release tablet Take 1 tablet (20 mg total) by mouth every 6 (six) hours as needed (pain).    predniSONE (DELTASONE) 20 MG tablet TAKE ONE TABLET BY MOUTH ONCE DAILY AS NEEDED     No current facility-administered medications for this visit.     ALLERGIES:   Review of patient's allergies indicates:   Allergen Reactions    Methotrexate analogues      Elevated Liver Enzyme    Bactrim [sulfamethoxazole-trimethoprim] Nausea And Vomiting and Rash       Review of Systems:     Review of Systems   Constitutional: Positive for fatigue. Negative for appetite change, chills, diaphoresis, fever and unexpected weight change.   HENT:   Negative for hearing loss, mouth sores, nosebleeds, sore throat, trouble swallowing and voice change.    Eyes: Negative for eye problems and icterus.   Respiratory: Negative for chest tightness, cough, hemoptysis, shortness of breath and wheezing.    Cardiovascular: Negative for chest pain, leg swelling and palpitations.   Gastrointestinal: Negative for  abdominal distention, abdominal pain, blood in stool, diarrhea, nausea and vomiting.   Endocrine: Negative for hot flashes.   Genitourinary: Negative for bladder incontinence, difficulty urinating, dysuria and hematuria.    Musculoskeletal: Positive for arthralgias and myalgias. Negative for back pain, flank pain, gait problem, neck pain and neck stiffness.   Skin: Negative for itching, rash and wound.   Neurological: Negative for dizziness, extremity weakness, gait problem, headaches, numbness, seizures and speech difficulty.   Hematological: Negative for adenopathy. Does not  bleed easily.   Psychiatric/Behavioral: Negative for confusion, depression and sleep disturbance. The patient is not nervous/anxious.        Physical Exam:     Vitals:    09/14/23 1311   BP: 118/62   Pulse: 74   Temp: 98.4 °F (36.9 °C)         Constitutional:       Appearance: She is well-developed.   HENT:      Head: Normocephalic and atraumatic.      Right Ear: External ear normal.      Left Ear: External ear normal.      Mouth/Throat:      Pharynx: No posterior oropharyngeal erythema.   Eyes:      Conjunctiva/sclera: Conjunctivae normal.   Cardiovascular:      Rate and Rhythm: Normal rate and regular rhythm.      Heart sounds: Normal heart sounds. No murmur heard.     Pulmonary:      Effort: Pulmonary effort is normal. No respiratory distress.      Breath sounds: Normal breath sounds. No stridor. No wheezing or rales.   Abdominal:      General: Bowel sounds are normal. There is no distension.      Palpations: Abdomen is soft.      Tenderness: There is no abdominal tenderness.   Musculoskeletal:         General: Normal range of motion.      Cervical back: Normal range of motion.      Right lower leg: No edema.      Left lower leg: No edema.   Skin:     General: Skin is warm and dry.      Findings: No rash.   Neurological:      Mental Status: She is alert and oriented to person, place, and time.   Psychiatric:         Behavior: Behavior  normal.         Thought Content: Thought content normal.         Judgment: Judgment normal.       ECOG Performance Status: (foot note - ECOG PS provided by Eastern Cooperative Oncology Group) 2 - Symptomatic, <50% confined to bed    Karnofsky Performance Score:  80%- Normal Activity with Effort: Some Symptoms of Disease    Labs:   Lab Results   Component Value Date    WBC 3.07 (L) 09/14/2023    HGB 9.8 (L) 09/14/2023    HCT 32.1 (L) 09/14/2023    PLT 52 (L) 09/14/2023     (H) 09/14/2023    AST 28 09/14/2023     09/14/2023    K 4.2 09/14/2023     09/14/2023    CREATININE 0.9 09/14/2023    BUN 16 09/14/2023    CO2 24 09/14/2023    TSH 1.913 10/17/2019    INR 0.9 01/31/2021    HGBA1C 6.0 (H) 01/31/2021       Imaging: previous imaging has been reviewed    Assessment and Plan:     Mrs. Mac is a 73 year old female with AML in second remission.     AML  -- Admitted from [facility] on 5/25/18 early AM with WBC around 100s, for suspected new non-M3 AML  --lo res HLA typing on 5/26/18 and hi res on 5/27/18 done in anticipation of possible need for future transplant   --Pt with two daughters, two full sisters (in their mid 60s, one with brain aneurysm hx, other one without any medical issues), and one full brother (59 y/o healthy).  --NPM1 +, CEBPA (-), FLT3 (+).  On Midostaurin 50 mg PO BID until Day 14 (held starting 6/8 to 6/11). Stopped when FLAG JENNIFER re-induction started. Restarted Midostaurin at 25 mg bid on day 8 of FLAG JENNIFER induction (6/22), increase to full dose 50 mg bid (6/26) as improvement in liver enzymes. Stopped 7/3/18 due to abnormal cardiac echo.  --day 14 bone marrow biopsy completed 6/11 showing persistent disease with 15% CD 34 positive blasts  --Day 60 of FLAG-JENNIFER, tolerating without difficulty except nausea/vomiting  --day 14 restaging bone marrow biopsy done 6/29 without complication, results with no evidence of AML, FLT 3 negative, will need repeat marrow at count recovery  outpatient   --recovery marrow showed no evidence of disease  --HiDAC #1 on 8/14/18, HiDAC#2 on 9/11/18  --Cycle 3 delayed 1 week due to pancytopenia. Started on 10/16/18  --recovery bone marrow showed no signs of residual leukemia  --Bone marrow biopsy done last week reveals early relapse. 11% blasts. We discussed the possibility of re-induction, but she is adamantly against. She does not want to be admitted to the hospital.  --Plan for single agent Gilteritinib 120mg has changed based on next gen sequencing that reports FLT-3 negative at this time  --Completed 4 cycles of Decitabine + Venetoclax  --Most recent marrow shows no evidence of disease  --Will continue with supportive care  --Continue to follow monthly labs    Traumatic Head injury  --CT head today showed no evidence of a bleed  --Will repeat imaging in 4 weeks to ensure there is not a delayed bleed    Neutropenia  --Evolving over the last several weeks  --Likely due to acute illness  --No others or blasts seen on differential  --Will continue to monitor with a low threshold to do flow cytometry or bone marrow biopsy to fully assess state of disease      Abnormal Liver Function Tests  --ALT and AST and Alk phos have again increased substantially since re-starting midostaurin.   --midostaurin started day 8 at 25 mg bid, monitoring liver enzymes closely and improved. Increasing Midostaurin to 50 mg bid 6/26. Stopped Midostaurin (7/3) due to new diagnosis of systolic heart failure EF 35%.    --permanently discontinued midostaurin   --LFTs fluctuate with tylenol use    Chemotherapy induced cardiomyopathy  --cardiology follow-up as outpatient in 3 months  --repeat 2D echo on 6/15 with preserved EF of 60%.  However 7/2/18 echo with reduced EF 30-35%  --Echo on 8/15/18 was within normal range EF of 60-65%    Rheumatologic issues  --Previously on steroids and methotrexate  --Now on single agent low dose steroids [she self adjusts this medication]  --previously  worked with [facility] rheumatology      30 minutes were spent face to face with the patient and her family to discuss the disease, natural history, treatment options and survival statistics. I have provided the patient with an opportunity to ask questions and have all questions answered to her satisfaction.      she will return to the clinic in 4 weeks, but knows to call in the interim if symptoms change or should a problem arise.        Laquita Troy MD  Hematology and Medical Oncology  Bone Marrow Transplant  [facility]             BMT Chart Routing      Follow up with physician 4 weeks. 1. see me in 4 weeks with cbc,cmp [likes 1pm/2pm appointments] 3.CT head 2 hours prior to appt with me [please schedule it at ct machine in [facility]]. ex.CT at 11am, labs at noon and see me at 1pm   Follow up with SHORTY    Provider visit type    Infusion scheduling note    Injection scheduling note    Labs    Imaging    Pharmacy appointment    Other referrals

## 2023-09-19 ENCOUNTER — PATIENT MESSAGE (OUTPATIENT)
Dept: HEMATOLOGY/ONCOLOGY | Facility: CLINIC | Age: 73
End: 2023-09-19
Payer: MEDICARE

## 2023-09-19 DIAGNOSIS — C93.01 ACUTE MONOCYTIC LEUKEMIA IN REMISSION: Primary | ICD-10-CM

## 2023-09-19 DIAGNOSIS — R26.2 IMPAIRED AMBULATION: ICD-10-CM

## 2023-09-19 PROBLEM — S09.90XA TRAUMATIC HEAD INJURY LESS THAN 3 MONTHS AGO: Status: ACTIVE | Noted: 2023-09-19

## 2023-09-20 ENCOUNTER — DOCUMENTATION ONLY (OUTPATIENT)
Dept: HEMATOLOGY/ONCOLOGY | Facility: CLINIC | Age: 73
End: 2023-09-20
Payer: MEDICARE

## 2023-09-20 NOTE — PROGRESS NOTES
received a request that the patient needed a wheelchair.  spoke with the patient's spouse to determine their was a preferred DME provider. The patient's spouse suggested [facility].   contacted the provider ,and faxed the wheelchair order to [phone].

## 2023-10-19 ENCOUNTER — PATIENT MESSAGE (OUTPATIENT)
Dept: HEMATOLOGY/ONCOLOGY | Facility: CLINIC | Age: 73
End: 2023-10-19
Payer: MEDICARE

## 2023-11-10 DIAGNOSIS — C92.02 ACUTE MYELOID LEUKEMIA IN RELAPSE: ICD-10-CM

## 2023-11-10 DIAGNOSIS — F41.9 ANXIETY: ICD-10-CM

## 2023-11-10 RX ORDER — ESCITALOPRAM OXALATE 20 MG/1
20 TABLET ORAL NIGHTLY
Qty: 30 TABLET | Refills: 5 | Status: SHIPPED | OUTPATIENT
Start: 2023-11-10

## 2023-11-27 ENCOUNTER — HOSPITAL ENCOUNTER (OUTPATIENT)
Dept: RADIOLOGY | Facility: HOSPITAL | Age: 73
Discharge: HOME | End: 2023-11-27
Attending: INTERNAL MEDICINE
Payer: MEDICARE

## 2023-11-27 ENCOUNTER — OFFICE VISIT (OUTPATIENT)
Dept: HEMATOLOGY/ONCOLOGY | Facility: CLINIC | Age: 73
End: 2023-11-27
Attending: INTERNAL MEDICINE
Payer: MEDICARE

## 2023-11-27 VITALS
OXYGEN SATURATION: 94 % | HEART RATE: 85 BPM | RESPIRATION RATE: 18 BRPM | DIASTOLIC BLOOD PRESSURE: 59 MMHG | HEIGHT: 64 IN | WEIGHT: 207.81 LBS | BODY MASS INDEX: 35.48 KG/M2 | SYSTOLIC BLOOD PRESSURE: 121 MMHG | TEMPERATURE: 99 F

## 2023-11-27 DIAGNOSIS — G89.3 CANCER RELATED PAIN: ICD-10-CM

## 2023-11-27 DIAGNOSIS — T45.1X5A CHEMOTHERAPY INDUCED CARDIOMYOPATHY: ICD-10-CM

## 2023-11-27 DIAGNOSIS — I42.7 CHEMOTHERAPY INDUCED CARDIOMYOPATHY: ICD-10-CM

## 2023-11-27 DIAGNOSIS — S09.90XA HEAD TRAUMA, INITIAL ENCOUNTER: ICD-10-CM

## 2023-11-27 DIAGNOSIS — M35.1 MCTD (MIXED CONNECTIVE TISSUE DISEASE): ICD-10-CM

## 2023-11-27 DIAGNOSIS — Z86.19 HISTORY OF CLOSTRIDIUM DIFFICILE INFECTION: ICD-10-CM

## 2023-11-27 DIAGNOSIS — C92.02 AML (ACUTE MYELOID LEUKEMIA) IN RELAPSE: Primary | ICD-10-CM

## 2023-11-27 PROCEDURE — 70450 CT HEAD WITHOUT CONTRAST: ICD-10-PCS | Mod: 26,,, | Performed by: RADIOLOGY

## 2023-11-27 PROCEDURE — 70450 CT HEAD/BRAIN W/O DYE: CPT | Mod: 26,,, | Performed by: RADIOLOGY

## 2023-11-27 PROCEDURE — 99215 OFFICE O/P EST HI 40 MIN: CPT | Mod: S$PBB,,, | Performed by: INTERNAL MEDICINE

## 2023-11-27 PROCEDURE — 99215 PR OFFICE/OUTPT VISIT, EST, LEVL V, 40-54 MIN: ICD-10-PCS | Mod: S$PBB,,, | Performed by: INTERNAL MEDICINE

## 2023-11-27 PROCEDURE — 99999 PR PBB SHADOW E&M-EST. PATIENT-LVL III: ICD-10-PCS | Mod: PBBFAC,,, | Performed by: INTERNAL MEDICINE

## 2023-11-27 PROCEDURE — 99999 PR PBB SHADOW E&M-EST. PATIENT-LVL III: CPT | Mod: PBBFAC,,, | Performed by: INTERNAL MEDICINE

## 2023-11-27 PROCEDURE — 70450 CT HEAD/BRAIN W/O DYE: CPT | Mod: TC

## 2023-11-27 PROCEDURE — 99213 OFFICE O/P EST LOW 20 MIN: CPT | Mod: PBBFAC,25 | Performed by: INTERNAL MEDICINE

## 2023-11-27 RX ORDER — NITROFURANTOIN 25; 75 MG/1; MG/1
100 CAPSULE ORAL 2 TIMES DAILY
COMMUNITY
Start: 2023-11-24 | End: 2024-01-03 | Stop reason: ALTCHOICE

## 2023-11-27 RX ORDER — OXYCODONE HYDROCHLORIDE 10 MG/1
10 TABLET ORAL EVERY 4 HOURS PRN
Qty: 60 TABLET | Refills: 0 | Status: SHIPPED | OUTPATIENT
Start: 2023-11-27 | End: 2024-01-24 | Stop reason: SDUPTHER

## 2023-11-27 RX ORDER — TIZANIDINE 4 MG/1
4 TABLET ORAL 3 TIMES DAILY
Qty: 90 TABLET | Refills: 1 | Status: SHIPPED | OUTPATIENT
Start: 2023-11-27 | End: 2024-11-26

## 2023-11-27 NOTE — H&P (VIEW-ONLY)
Hematology and Medical Oncology   Follow Up Note     11/27/2023    Primary Oncologic Diagnosis: AML, FLT 3 + and NPMN1+    History of Present Ilness:   Sabrina Badillo) is a pleasant 73 y.o.female presents to clinic following 2 induction therapies for AML. She was in the hospital roughly 50 days to receive 7+3 and then FLAG JENNIFER. Now on active observation.    Oncology History:   68 y.o. female admitted overnight for possible new AML. Came in from OSH with elevated WBC of 100.   05/25/2018: Pt is now on hydrea 2 grams BID, allopurinol 300 mg daily, and IVF. Pt may need rasburicase this weekend. She will undergo a BM bx and aspiration today. She is an induction candidate and will not be screened for research trials. She has anxiety for which she usually takes xanax, this was re-started.   05/26/2018 PICC placed. Reports she slept well last night. Anxiety improved with prn xanax. EF 65%, HIV and Hep panel negative. 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  6/12/2018: Day 15 of 7+3 induction, restaging BM bx done yesterday. On Midostaurin. Fever yesterday and BC with gram + cocci in clusters. On cefepime day 2 and will start Vanc today. Labial mucositis improving.   6/13/2018: Day 16 7+3. BC with staph aureus, identification pending. Surveillance blood cultures done today. Afebrile x 48 hours. On cefepime and Vanc. Labial mucositis resolved. No complaints of pain. Nausea controlled. No diarrhea. Primary complaint is fatigue.   6/14/20018: Day 17 of 7+3. Day 14 bone marrow results pending. BC with staph epidermis only sensitive to Vancomycin. Vanc day 3 and cefepime day 4. Vanc trough 12.2 last night. BP remains low but stable. Afebrile x 72 hours.   6/15/2018: Day 18 of 7+3. Day 14 bone marrow biopsy shows persistent disease with 15% blasts. Discussion with patient regarding treatment and she is deciding if she wants to proceed with re-induction vs outpatient maintenance. Temp of 100.4 overnight, unsustained.  Day 5 Cefepime and Day 4 of Vanc for staph epidermis. Repeat cultures NGTD. BP improved. Electrolytes (mag, phos, K and calcium) replaced aggressively this am and will repeat labs this afternoon. No complaints this am.   06/16/2018: Day 19 of 7+3. Day 2 of Flag-JENNIFER. Remains afebrile. Calcium remains low and have increased PO supplementation. Remains on vanc and aztreonam. Somewhat loopy feeling after receiving benadryl.   06/17/2018: Day 20 of 7+3. Day 3 of FLAG-JENNIFER. Multiple electrolyte abnormalities. New bilateral leg and feet swelling.   6/18/2018: Day 21 of 7+3 and Day 4 of FLAG JENNIFER. Tolerating well with some nausea controlled with Zofran. VSS, afebrile. ANC 1900 on Neupogen. Continues Vanc for staph epi bacteremia. Multiple electrolytes replaced today. Complains of edema to lower extremities, not improved after 20 of lasix yesterday. No sob or CP.   6/19/18: Day 22 of 7+3 and Day 5 of FLAG JENNIFER. VSS, afebrile, ANC 3900. Vanc trough elevated and dose adjusted this am. Lower extremity edema improved after 40 of lasix yesterday, net negative 300 cc I&O. Mild nausea, no abdominal pain or diarrhea. Poor appetite but no difficulty eating or taking pills.   6/20/18: Day 23 of 7+3 and Day 6 of FLAG JENNIFER. Patient complains of nausea and vomiting overnight and this am, especially when taking pills. Will add Zyprexa daily in addition to Zofran, compazine, and ativan PRN and will change meds to IV. 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 Afebrile.  No diarrhea, mouth sores or pain.  06/21/2018: Day 24 of 7+3 and Day 7 of FLAG JENNFIER. Nausea better controlled. Continued on Vanc.  6/22/2018: Day 25 of 7+3 and Day 8 of FLAG JENNIFER. Nausea improved some with Zyprexa but did have 1 episode of emesis overnight.continuing meds IV due to vomiting. Remains afebrile. ANC 0. Continues Vanc and Flagyl. Electrolytes replaced.   06/23/2018 : day 26 of 7+3 and Day 9 of FLAG  JENNIFER.  Nausea persists, worsened after taking pills so meds converted to IV.  Encouraged ambulation.  Continue with flagyl for leptotrichia goodfellowii.  RUQ US showed intrahepatic dilation, overall impression hepatic steatosis.  CBD 0.5cm.  No Gallbladder.    06/24/2018 : day 27 of 7+3 and Day 10 of FLAG JENNIFER.  Nausea persisting, able to tolerate some po pills.  Last day of flagyl (day 5).  Still pancytopenic. Appetite still low as po intake triggers nausea.  06/25/2018: day 28 of 7+3 and Day 11 of FLAG JENNIFER. Afebrile, ANC 0. Has completed Flagyl. Will decrease Vanc to 1000 mg q12, trough 19.9, will complete Vanc on 6/27. Liver enzymes stable on Midostaurin. VSS.   6/26/2018: day 29 of 7+3 and day 12 of FLAG JENNIFER. Liver enzymes improved and Midostaurin increased to full dose 50 mg bid. Nausea controlled, afebrile, VSS, NEON.  6/27/2018: day 30 of 7+3 and Day 13 of FLAG JENNIFER. Persistent nausea and emesis x 1 yesterday. Compazine changed to scheduled. Vanc ends today. Afebrile, VSS. Aggressive electrolyte replacement, low electrolytes possibly due to Midostaurin.   6/28/2018: day 31 of 7+3 and Day 14 of FLAG JENNIFER. Nausea improved with scheduled Compazine. Patient has agreed to start converting some IV meds to po. VSS, afebrile, electrolytes wnl today. Only complains of fatigue, new rash noted to upper back and chest and legs, papular rash mildly red.  6/29/2018: Day 32 of 7+3 and Day 15 of FLAG JENNIFER. Day 14 restaging bone marrow biopsy done at bedside today. Patient states nausea improved but she did have emesis last night after taking 9 pm pills. Rash improved. No mouth sores, diarrhea or pain.   7/2/2018: Day 35 of 7+3 and Day 18 of FLAG JENNIFER. Restaging BM bx results pending. Fluid overload over the weekend. Chest x-ray with pulmonary edema. Os sats down to 88%. Placed on O2 at 2 L NC, off O2 this am. Received lasix. Net negative 2.7 L today. 1 episode of nausea, no emesis. Reports anxiety relieved with PRN meds.  Electrolytes improved, afebrile, VSS.   7/3/2018: Day 36 of 7+3 and Day 19 of FLAG JENNIFER. Restaging Bm bx with GABRIEL. Cardiology consulted for abnormal echo, EF 30% with pulmonary HTN and severe L atrial enlargement. EKG with T wave abnormality, possible anterolateral ischemia and prolonged QTc of 530. Medications adjusted. Nausea controlled, no diarrhea. Electrolytes improved. On O2 as needed.   07/04/2018 : Day 37 of 7+3 and Day 20 of FLAG JENNIFER Diarrhea in AM, watery, no associated abdominal pain, nausea, or vomiting.    07/05/2018: Day 38 of 7+3 ane Day 21 of FLAG JENNIFER. No CP or SOB, cardiology following. On RA. All meds changed to po, denies nausea or vomiting and no diarrhea. VSS, afebrile.   7/6/2018: Day 22 of FLAG JENNIFER. Continues to tolerate po meds with no nausea. Afebrile. Awaiting count recovery for discharge.  7/7/2018-/8/2018: Day 40/41 of 7+3, Day 23/24 of FLAG JENNIFER.  Improving clinically.  Started on mag oxide per patient request.  Labs showing signs of ANC recovery.  7/9/2018: Day 25 FLAG JENNIFER, , continues Neupogen. Received platelets today. Afebrile, VSS. Tolerating all oral meds with no nausea or vomiting. Only complains of fatigue.   7/10/2018: Day 26 FLAG JENNIFER,  today. Had 2 episodes of vomiting and diarrhea last night. Feeling better. Afebrile, VSS.   7/11/2018: Day 27 FLAG JENNIFER,  today. No vomiting or diarrhea overnight and no nausea. Afebrile, VSS. Complains of back pain (bone pain) suspect due to count recovery. Relieved with oxy IR and Zyrtec started.  07/12/2018: Day 28 flag jennifer, engrafted today with ANC of 730. Back pain improved with zyrtec. Scheduled for IT chemo tomorrow at 1:30 pm and discharge home thereafter. Will likely need plt transfusion prior to IT chemo tomorrow   7/13/2018: Day 29 of FLAG JENNIFER. ANC up to 1300 today. Transfusing platelets today prior to LP for IT chemo. Patient continues to complain of bone pain, mostly to back and legs. No nausea, diarrhea, sob.  Afebrile and VSS.   --hospitalized 7/23-7/25 for C.diff diarrhea, neutropenic fever.  While inpatient she developed pink blisters on her palm that were concerning for relapse.  --palm biopsy on 7/26/18 for suspicion of leukemia cutis was also negative for sign of relapse  --bone marrow biopsy 7/31/18 was negative for signs of relapse   --Admitted on 8/14/18 for HiDAC#1  --Admitted on 9/11/18 for HiDAC#2  --Admitted on 10/16/18 for HiDAC#3  --Post consolidation bone marrow biopsy on 12/27/2018: AML FISH (BM) Interpretation: The result is within normal limits for the AML FISH panel. No evidence of residual disease.  --Bone Marrow Biopsy on 5/9/19 NO MORPHOLOGIC EVIDENCE OF RESIDUAL ACUTE MYELOID LEUKEMIA. RECOMMEND NGS STUDY TO RULE OUT RESIDUAL DISEASE. deletion 20q  --Bone Marrow Biopsy on 9/23/2020: CELLULARITY=40-50%, TRILINEAGE HEMATOPOIETIC ACTIVITY (M/E= 0.6:1). ERYTHROID HYPERPLASIA, DYSERYTHROPOIESIS, AND INCREASED BLAST (11%). SEE COMMENT.  FOCAL GRADE 1 RETICULAR FIBROSIS.  --Decitabine + Venetoclax: C1D1: 10/12/2020, C2D1: 11/9  --Bone marrow biopsy on 2/9/2021: VARIABLY CELLULAR MARROW WITH DYSERYTHROPOIESIS, DYSMEGAKARYOPOIESIS AND  MARKED MYELOID HYPOPLASIA.NO MORPHOLOGIC EVIDENCE OF RESIDUAL ACUTE MYELOID LEUKEMIA.    Interval History:   Mrs. Mac is a 73 y.o. patient who presents with her daughter. Fell 3 weeks ago while cleaning something in the oven. Broke her fall by grabbing the oven door. Both arms remain extremely bruised.     2 days ago developed reoccuring headache and in neck, Has reached a pain level of an 8.    Now has a UTI. Diagnosed on Thursday night and started on a 10 day course of macrobid. Dysuria is ongoing.    Now bruising. A lot of them from the fall on the stove door.     Overall feels run down and that something is wrong.     Past Medical History:   Past Medical History:   Diagnosis Date    Cancer 03/2018    AML    CHF (congestive heart failure)     Diarrhea 9/14/2018     Encounter for blood transfusion        Current Medications:   Current Outpatient Medications   Medication Sig    ALPRAZolam (XANAX) 2 MG Tab TAKE ONE TABLET BY MOUTH NIGHTLY AS NEEDED    EScitalopram oxalate (LEXAPRO) 20 MG tablet TAKE ONE TABLET BY MOUTH EACH EVENING    methocarbamoL (ROBAXIN) 500 MG Tab Take 1 tablet (500 mg total) by mouth 4 (four) times daily.    metoprolol succinate (TOPROL-XL) 25 MG 24 hr tablet Take 1 tablet (25 mg total) by mouth once daily.    nitrofurantoin, macrocrystal-monohydrate, (MACROBID) 100 MG capsule Take 100 mg by mouth 2 (two) times daily.    omeprazole (PRILOSEC) 20 MG capsule Take 1 capsule (20 mg total) by mouth 2 (two) times daily before meals.    ondansetron (ZOFRAN) 8 MG tablet Take 1 tablet (8 mg total) by mouth every 12 (twelve) hours as needed for Nausea.    ondansetron (ZOFRAN-ODT) 8 MG TbDL Take 8 mg by mouth 3 (three) times daily.    oxyCODONE (ROXICODONE) 10 mg Tab immediate release tablet Take 1 tablet (10 mg total) by mouth every 6 (six) hours as needed (pain).    oxyCODONE (ROXICODONE) 20 mg Tab immediate release tablet Take 1 tablet (20 mg total) by mouth every 6 (six) hours as needed (pain).    predniSONE (DELTASONE) 20 MG tablet TAKE ONE TABLET BY MOUTH ONCE DAILY AS NEEDED     No current facility-administered medications for this visit.     ALLERGIES:   Review of patient's allergies indicates:   Allergen Reactions    Methotrexate analogues      Elevated Liver Enzyme    Bactrim [sulfamethoxazole-trimethoprim] Nausea And Vomiting and Rash       Review of Systems:     Review of Systems   Constitutional: Positive for fatigue. Negative for appetite change, chills, diaphoresis, fever and unexpected weight change.   HENT:   Negative for hearing loss, mouth sores, nosebleeds, sore throat, trouble swallowing and voice change.    Eyes: Negative for eye problems and icterus.   Respiratory: Negative for chest tightness, cough, hemoptysis, shortness of breath and  wheezing.    Cardiovascular: Negative for chest pain, leg swelling and palpitations.   Gastrointestinal: Negative for abdominal distention, abdominal pain, blood in stool, diarrhea, nausea and vomiting.   Endocrine: Negative for hot flashes.   Genitourinary: Negative for bladder incontinence, difficulty urinating, dysuria and hematuria.    Musculoskeletal: Positive for arthralgias and myalgias. Negative for back pain, flank pain, gait problem, neck pain and neck stiffness.   Skin: Negative for itching, rash and wound.   Neurological: Negative for dizziness, extremity weakness, gait problem, headaches, numbness, seizures and speech difficulty.   Hematological: Negative for adenopathy. Does not  bleed easily.   Psychiatric/Behavioral: Negative for confusion, depression and sleep disturbance. The patient is not nervous/anxious.        Physical Exam:     Vitals:    11/27/23 1309   BP: (!) 121/59   Pulse: 85   Resp: 18   Temp: 98.8 °F (37.1 °C)         Constitutional:       Appearance: She is well-developed.   HENT:      Head: Normocephalic and atraumatic.      Right Ear: External ear normal.      Left Ear: External ear normal.      Mouth/Throat:      Pharynx: No posterior oropharyngeal erythema.   Eyes:      Conjunctiva/sclera: Conjunctivae normal.   Cardiovascular:      Rate and Rhythm: Normal rate and regular rhythm.      Heart sounds: Normal heart sounds. No murmur heard.     Pulmonary:      Effort: Pulmonary effort is normal. No respiratory distress.      Breath sounds: Normal breath sounds. No stridor. No wheezing or rales.   Abdominal:      General: Bowel sounds are normal. There is no distension.      Palpations: Abdomen is soft.      Tenderness: There is no abdominal tenderness.   Musculoskeletal:         General: Normal range of motion.      Cervical back: Normal range of motion.      Right lower leg: No edema.      Left lower leg: No edema.   Skin:     General: Skin is warm and dry.      Findings: No rash.    Neurological:      Mental Status: She is alert and oriented to person, place, and time.   Psychiatric:         Behavior: Behavior normal.         Thought Content: Thought content normal.         Judgment: Judgment normal.       ECOG Performance Status: (foot note - ECOG PS provided by Eastern Cooperative Oncology Group) 2 - Symptomatic, <50% confined to bed    Karnofsky Performance Score:  80%- Normal Activity with Effort: Some Symptoms of Disease    Labs:   Lab Results   Component Value Date    WBC 2.95 (L) 11/27/2023    HGB 9.7 (L) 11/27/2023    HCT 32.9 (L) 11/27/2023    PLT 56 (L) 11/27/2023    ALT 22 11/27/2023    AST 22 11/27/2023     11/27/2023    K 3.7 11/27/2023     11/27/2023    CREATININE 1.0 11/27/2023    BUN 20 11/27/2023    CO2 25 11/27/2023    TSH 1.913 10/17/2019    INR 0.9 01/31/2021    HGBA1C 6.0 (H) 01/31/2021       Imaging: previous imaging has been reviewed    Assessment and Plan:     Mrs. Mac is a 73 year old female with AML in second remission.     AML  -- Ad[facility] early AM with WBC around 100s, for suspected new non-M3 AML  --lo res HLA typing on 5/26/18 and hi res on 5/27/18 done in anticipation of possible need for future transplant   --Pt with two daughters, two full sisters (in their mid 60s, one with brain aneurysm hx, other one without any medical issues), and one full brother (61 y/o healthy).  --NPM1 +, CEBPA (-), FLT3 (+).  On Midostaurin 50 mg PO BID until Day 14 (held starting 6/8 to 6/11). Stopped when FLAG JENNIFER re-induction started. Restarted Midostaurin at 25 mg bid on day 8 of FLAG JENNIFER induction (6/22), increase to full dose 50 mg bid (6/26) as improvement in liver enzymes. Stopped 7/3/18 due to abnormal cardiac echo.  --day 14 bone marrow biopsy completed 6/11 showing persistent disease with 15% CD 34 positive blasts  --Day 60 of FLAG-JENNIFER, tolerating without difficulty except nausea/vomiting  --day 14 restaging bone marrow biopsy  done 6/29 without complication, results with no evidence of AML, FLT 3 negative, will need repeat marrow at count recovery outpatient   --recovery marrow showed no evidence of disease  --HiDAC #1 on 8/14/18, HiDAC#2 on 9/11/18  --Cycle 3 delayed 1 week due to pancytopenia. Started on 10/16/18  --recovery bone marrow showed no signs of residual leukemia  --Bone marrow biopsy done last week reveals early relapse. 11% blasts. We discussed the possibility of re-induction, but she is adamantly against. She does not want to be admitted to the hospital.  --Plan for single agent Gilteritinib 120mg has changed based on next gen sequencing that reports FLT-3 negative at this time  --Completed 4 cycles of Decitabine + Venetoclax  --Most recent marrow shows no evidence of disease  --Due to evolving bruises and decline in counts plan for repeat bone marrow biopsy under sedation    Traumatic Head injury  --CT head showed no evidence of a bleed  --repeat imaging in 4 weeks to ensure there is not a delayed bleed    Neutropenia  --Evolving over the last several weeks  --Likely due to acute illness  --No others or blasts seen on differential  --Will continue to monitor with a low threshold to do flow cytometry or bone marrow biopsy to fully assess state of disease      Abnormal Liver Function Tests  --ALT and AST and Alk phos have again increased substantially since re-starting midostaurin.   --midostaurin started day 8 at 25 mg bid, monitoring liver enzymes closely and improved. Increasing Midostaurin to 50 mg bid 6/26. Stopped Midostaurin (7/3) due to new diagnosis of systolic heart failure EF 35%.    --permanently discontinued midostaurin   --LFTs fluctuate with tylenol use    Chemotherapy induced cardiomyopathy  --cardiology follow-up as outpatient in 3 months  --repeat 2D echo on 6/15 with preserved EF of 60%.  However 7/2/18 echo with reduced EF 30-35%  --Echo on 8/15/18 was within normal range EF of 60-65%    Rheumatologic  issues  --Previously on steroids and methotrexate  --Now on single agent low dose steroids [she self adjusts this medication]  --previously worked with [facility] rheumatology      30 minutes were spent face to face with the patient and her family to discuss the disease, natural history, treatment options and survival statistics. I have provided the patient with an opportunity to ask questions and have all questions answered to her satisfaction.      she will return to the clinic 1 week after her bone marrow biopsy, but knows to call in the interim if symptoms change or should a problem arise.        Laquita Troy MD  Hematology and Medical Oncology  Bone Marrow Transplant  [facility]             BMT Chart Routing  Urgent    Follow up with physician . 1. bone marrow in endo 2.see me 1 week after marrow with cbc,cmp [okay to over book a 1pm spot]   Follow up with SH[facility]    Provider visit type    Infusion scheduling note    Injection scheduling note    Labs    Imaging    Pharmacy appointment    Other referrals

## 2023-11-27 NOTE — PROGRESS NOTES
Hematology and Medical Oncology   Follow Up Note     11/27/2023    Primary Oncologic Diagnosis: AML, FLT 3 + and NPMN1+    History of Present Ilness:   Sabrina Badillo) is a pleasant 73 y.o.female presents to clinic following 2 induction therapies for AML. She was in the hospital roughly 50 days to receive 7+3 and then FLAG JENNIFER. Now on active observation.    Oncology History:   68 y.o. female admitted overnight for possible new AML. Came in from OSH with elevated WBC of 100.   05/25/2018: Pt is now on hydrea 2 grams BID, allopurinol 300 mg daily, and IVF. Pt may need rasburicase this weekend. She will undergo a BM bx and aspiration today. She is an induction candidate and will not be screened for research trials. She has anxiety for which she usually takes xanax, this was re-started.   05/26/2018 PICC placed. Reports she slept well last night. Anxiety improved with prn xanax. EF 65%, HIV and Hep panel negative. 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  6/12/2018: Day 15 of 7+3 induction, restaging BM bx done yesterday. On Midostaurin. Fever yesterday and BC with gram + cocci in clusters. On cefepime day 2 and will start Vanc today. Labial mucositis improving.   6/13/2018: Day 16 7+3. BC with staph aureus, identification pending. Surveillance blood cultures done today. Afebrile x 48 hours. On cefepime and Vanc. Labial mucositis resolved. No complaints of pain. Nausea controlled. No diarrhea. Primary complaint is fatigue.   6/14/20018: Day 17 of 7+3. Day 14 bone marrow results pending. BC with staph epidermis only sensitive to Vancomycin. Vanc day 3 and cefepime day 4. Vanc trough 12.2 last night. BP remains low but stable. Afebrile x 72 hours.   6/15/2018: Day 18 of 7+3. Day 14 bone marrow biopsy shows persistent disease with 15% blasts. Discussion with patient regarding treatment and she is deciding if she wants to proceed with re-induction vs outpatient maintenance. Temp of 100.4 overnight, unsustained.  Day 5 Cefepime and Day 4 of Vanc for staph epidermis. Repeat cultures NGTD. BP improved. Electrolytes (mag, phos, K and calcium) replaced aggressively this am and will repeat labs this afternoon. No complaints this am.   06/16/2018: Day 19 of 7+3. Day 2 of Flag-JENNIFER. Remains afebrile. Calcium remains low and have increased PO supplementation. Remains on vanc and aztreonam. Somewhat loopy feeling after receiving benadryl.   06/17/2018: Day 20 of 7+3. Day 3 of FLAG-JENNIFER. Multiple electrolyte abnormalities. New bilateral leg and feet swelling.   6/18/2018: Day 21 of 7+3 and Day 4 of FLAG JENNIFER. Tolerating well with some nausea controlled with Zofran. VSS, afebrile. ANC 1900 on Neupogen. Continues Vanc for staph epi bacteremia. Multiple electrolytes replaced today. Complains of edema to lower extremities, not improved after 20 of lasix yesterday. No sob or CP.   6/19/18: Day 22 of 7+3 and Day 5 of FLAG JENNIFER. VSS, afebrile, ANC 3900. Vanc trough elevated and dose adjusted this am. Lower extremity edema improved after 40 of lasix yesterday, net negative 300 cc I&O. Mild nausea, no abdominal pain or diarrhea. Poor appetite but no difficulty eating or taking pills.   6/20/18: Day 23 of 7+3 and Day 6 of FLAG JENNIFER. Patient complains of nausea and vomiting overnight and this am, especially when taking pills. Will add Zyprexa daily in addition to Zofran, compazine, and ativan PRN and will change meds to IV. 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 Afebrile.  No diarrhea, mouth sores or pain.  06/21/2018: Day 24 of 7+3 and Day 7 of FLAG JENNIFER. Nausea better controlled. Continued on Vanc.  6/22/2018: Day 25 of 7+3 and Day 8 of FLAG JENNIFER. Nausea improved some with Zyprexa but did have 1 episode of emesis overnight.continuing meds IV due to vomiting. Remains afebrile. ANC 0. Continues Vanc and Flagyl. Electrolytes replaced.   06/23/2018 : day 26 of 7+3 and Day 9 of FLAG  JENNIFER.  Nausea persists, worsened after taking pills so meds converted to IV.  Encouraged ambulation.  Continue with flagyl for leptotrichia goodfellowii.  RUQ US showed intrahepatic dilation, overall impression hepatic steatosis.  CBD 0.5cm.  No Gallbladder.    06/24/2018 : day 27 of 7+3 and Day 10 of FLAG JENNIFER.  Nausea persisting, able to tolerate some po pills.  Last day of flagyl (day 5).  Still pancytopenic. Appetite still low as po intake triggers nausea.  06/25/2018: day 28 of 7+3 and Day 11 of FLAG JENNIFER. Afebrile, ANC 0. Has completed Flagyl. Will decrease Vanc to 1000 mg q12, trough 19.9, will complete Vanc on 6/27. Liver enzymes stable on Midostaurin. VSS.   6/26/2018: day 29 of 7+3 and day 12 of FLAG JENNIFER. Liver enzymes improved and Midostaurin increased to full dose 50 mg bid. Nausea controlled, afebrile, VSS, NEON.  6/27/2018: day 30 of 7+3 and Day 13 of FLAG JENNIFER. Persistent nausea and emesis x 1 yesterday. Compazine changed to scheduled. Vanc ends today. Afebrile, VSS. Aggressive electrolyte replacement, low electrolytes possibly due to Midostaurin.   6/28/2018: day 31 of 7+3 and Day 14 of FLAG JENNIFER. Nausea improved with scheduled Compazine. Patient has agreed to start converting some IV meds to po. VSS, afebrile, electrolytes wnl today. Only complains of fatigue, new rash noted to upper back and chest and legs, papular rash mildly red.  6/29/2018: Day 32 of 7+3 and Day 15 of FLAG JENNIFER. Day 14 restaging bone marrow biopsy done at bedside today. Patient states nausea improved but she did have emesis last night after taking 9 pm pills. Rash improved. No mouth sores, diarrhea or pain.   7/2/2018: Day 35 of 7+3 and Day 18 of FLAG JENNIFER. Restaging BM bx results pending. Fluid overload over the weekend. Chest x-ray with pulmonary edema. Os sats down to 88%. Placed on O2 at 2 L NC, off O2 this am. Received lasix. Net negative 2.7 L today. 1 episode of nausea, no emesis. Reports anxiety relieved with PRN meds.  Electrolytes improved, afebrile, VSS.   7/3/2018: Day 36 of 7+3 and Day 19 of FLAG JENNIFER. Restaging Bm bx with GABRIEL. Cardiology consulted for abnormal echo, EF 30% with pulmonary HTN and severe L atrial enlargement. EKG with T wave abnormality, possible anterolateral ischemia and prolonged QTc of 530. Medications adjusted. Nausea controlled, no diarrhea. Electrolytes improved. On O2 as needed.   07/04/2018 : Day 37 of 7+3 and Day 20 of FLAG JENNIFER Diarrhea in AM, watery, no associated abdominal pain, nausea, or vomiting.    07/05/2018: Day 38 of 7+3 ane Day 21 of FLAG JENNIFER. No CP or SOB, cardiology following. On RA. All meds changed to po, denies nausea or vomiting and no diarrhea. VSS, afebrile.   7/6/2018: Day 22 of FLAG JENNIFER. Continues to tolerate po meds with no nausea. Afebrile. Awaiting count recovery for discharge.  7/7/2018-/8/2018: Day 40/41 of 7+3, Day 23/24 of FLAG JENNIFER.  Improving clinically.  Started on mag oxide per patient request.  Labs showing signs of ANC recovery.  7/9/2018: Day 25 FLAG JENNIFER, , continues Neupogen. Received platelets today. Afebrile, VSS. Tolerating all oral meds with no nausea or vomiting. Only complains of fatigue.   7/10/2018: Day 26 FLAG JENNIFER,  today. Had 2 episodes of vomiting and diarrhea last night. Feeling better. Afebrile, VSS.   7/11/2018: Day 27 FLAG JENNIFER,  today. No vomiting or diarrhea overnight and no nausea. Afebrile, VSS. Complains of back pain (bone pain) suspect due to count recovery. Relieved with oxy IR and Zyrtec started.  07/12/2018: Day 28 flag jennifer, engrafted today with ANC of 730. Back pain improved with zyrtec. Scheduled for IT chemo tomorrow at 1:30 pm and discharge home thereafter. Will likely need plt transfusion prior to IT chemo tomorrow   7/13/2018: Day 29 of FLAG JENNIFER. ANC up to 1300 today. Transfusing platelets today prior to LP for IT chemo. Patient continues to complain of bone pain, mostly to back and legs. No nausea, diarrhea, sob.  Afebrile and VSS.   --hospitalized 7/23-7/25 for C.diff diarrhea, neutropenic fever.  While inpatient she developed pink blisters on her palm that were concerning for relapse.  --palm biopsy on 7/26/18 for suspicion of leukemia cutis was also negative for sign of relapse  --bone marrow biopsy 7/31/18 was negative for signs of relapse   --Admitted on 8/14/18 for HiDAC#1  --Admitted on 9/11/18 for HiDAC#2  --Admitted on 10/16/18 for HiDAC#3  --Post consolidation bone marrow biopsy on 12/27/2018: AML FISH (BM) Interpretation: The result is within normal limits for the AML FISH panel. No evidence of residual disease.  --Bone Marrow Biopsy on 5/9/19 NO MORPHOLOGIC EVIDENCE OF RESIDUAL ACUTE MYELOID LEUKEMIA. RECOMMEND NGS STUDY TO RULE OUT RESIDUAL DISEASE. deletion 20q  --Bone Marrow Biopsy on 9/23/2020: CELLULARITY=40-50%, TRILINEAGE HEMATOPOIETIC ACTIVITY (M/E= 0.6:1). ERYTHROID HYPERPLASIA, DYSERYTHROPOIESIS, AND INCREASED BLAST (11%). SEE COMMENT.  FOCAL GRADE 1 RETICULAR FIBROSIS.  --Decitabine + Venetoclax: C1D1: 10/12/2020, C2D1: 11/9  --Bone marrow biopsy on 2/9/2021: VARIABLY CELLULAR MARROW WITH DYSERYTHROPOIESIS, DYSMEGAKARYOPOIESIS AND  MARKED MYELOID HYPOPLASIA.NO MORPHOLOGIC EVIDENCE OF RESIDUAL ACUTE MYELOID LEUKEMIA.    Interval History:   Mrs. Mac is a 73 y.o. patient who presents with her daughter. Fell 3 weeks ago while cleaning something in the oven. Broke her fall by grabbing the oven door. Both arms remain extremely bruised.     2 days ago developed reoccuring headache and in neck, Has reached a pain level of an 8.    Now has a UTI. Diagnosed on Thursday night and started on a 10 day course of macrobid. Dysuria is ongoing.    Now bruising. A lot of them from the fall on the stove door.     Overall feels run down and that something is wrong.     Past Medical History:   Past Medical History:   Diagnosis Date    Cancer 03/2018    AML    CHF (congestive heart failure)     Diarrhea 9/14/2018     Encounter for blood transfusion        Current Medications:   Current Outpatient Medications   Medication Sig    ALPRAZolam (XANAX) 2 MG Tab TAKE ONE TABLET BY MOUTH NIGHTLY AS NEEDED    EScitalopram oxalate (LEXAPRO) 20 MG tablet TAKE ONE TABLET BY MOUTH EACH EVENING    methocarbamoL (ROBAXIN) 500 MG Tab Take 1 tablet (500 mg total) by mouth 4 (four) times daily.    metoprolol succinate (TOPROL-XL) 25 MG 24 hr tablet Take 1 tablet (25 mg total) by mouth once daily.    nitrofurantoin, macrocrystal-monohydrate, (MACROBID) 100 MG capsule Take 100 mg by mouth 2 (two) times daily.    omeprazole (PRILOSEC) 20 MG capsule Take 1 capsule (20 mg total) by mouth 2 (two) times daily before meals.    ondansetron (ZOFRAN) 8 MG tablet Take 1 tablet (8 mg total) by mouth every 12 (twelve) hours as needed for Nausea.    ondansetron (ZOFRAN-ODT) 8 MG TbDL Take 8 mg by mouth 3 (three) times daily.    oxyCODONE (ROXICODONE) 10 mg Tab immediate release tablet Take 1 tablet (10 mg total) by mouth every 6 (six) hours as needed (pain).    oxyCODONE (ROXICODONE) 20 mg Tab immediate release tablet Take 1 tablet (20 mg total) by mouth every 6 (six) hours as needed (pain).    predniSONE (DELTASONE) 20 MG tablet TAKE ONE TABLET BY MOUTH ONCE DAILY AS NEEDED     No current facility-administered medications for this visit.     ALLERGIES:   Review of patient's allergies indicates:   Allergen Reactions    Methotrexate analogues      Elevated Liver Enzyme    Bactrim [sulfamethoxazole-trimethoprim] Nausea And Vomiting and Rash       Review of Systems:     Review of Systems   Constitutional: Positive for fatigue. Negative for appetite change, chills, diaphoresis, fever and unexpected weight change.   HENT:   Negative for hearing loss, mouth sores, nosebleeds, sore throat, trouble swallowing and voice change.    Eyes: Negative for eye problems and icterus.   Respiratory: Negative for chest tightness, cough, hemoptysis, shortness of breath and  wheezing.    Cardiovascular: Negative for chest pain, leg swelling and palpitations.   Gastrointestinal: Negative for abdominal distention, abdominal pain, blood in stool, diarrhea, nausea and vomiting.   Endocrine: Negative for hot flashes.   Genitourinary: Negative for bladder incontinence, difficulty urinating, dysuria and hematuria.    Musculoskeletal: Positive for arthralgias and myalgias. Negative for back pain, flank pain, gait problem, neck pain and neck stiffness.   Skin: Negative for itching, rash and wound.   Neurological: Negative for dizziness, extremity weakness, gait problem, headaches, numbness, seizures and speech difficulty.   Hematological: Negative for adenopathy. Does not  bleed easily.   Psychiatric/Behavioral: Negative for confusion, depression and sleep disturbance. The patient is not nervous/anxious.        Physical Exam:     Vitals:    11/27/23 1309   BP: (!) 121/59   Pulse: 85   Resp: 18   Temp: 98.8 °F (37.1 °C)         Constitutional:       Appearance: She is well-developed.   HENT:      Head: Normocephalic and atraumatic.      Right Ear: External ear normal.      Left Ear: External ear normal.      Mouth/Throat:      Pharynx: No posterior oropharyngeal erythema.   Eyes:      Conjunctiva/sclera: Conjunctivae normal.   Cardiovascular:      Rate and Rhythm: Normal rate and regular rhythm.      Heart sounds: Normal heart sounds. No murmur heard.     Pulmonary:      Effort: Pulmonary effort is normal. No respiratory distress.      Breath sounds: Normal breath sounds. No stridor. No wheezing or rales.   Abdominal:      General: Bowel sounds are normal. There is no distension.      Palpations: Abdomen is soft.      Tenderness: There is no abdominal tenderness.   Musculoskeletal:         General: Normal range of motion.      Cervical back: Normal range of motion.      Right lower leg: No edema.      Left lower leg: No edema.   Skin:     General: Skin is warm and dry.      Findings: No rash.    Neurological:      Mental Status: She is alert and oriented to person, place, and time.   Psychiatric:         Behavior: Behavior normal.         Thought Content: Thought content normal.         Judgment: Judgment normal.       ECOG Performance Status: (foot note - ECOG PS provided by Eastern Cooperative Oncology Group) 2 - Symptomatic, <50% confined to bed    Karnofsky Performance Score:  80%- Normal Activity with Effort: Some Symptoms of Disease    Labs:   Lab Results   Component Value Date    WBC 2.95 (L) 11/27/2023    HGB 9.7 (L) 11/27/2023    HCT 32.9 (L) 11/27/2023    PLT 56 (L) 11/27/2023    ALT 22 11/27/2023    AST 22 11/27/2023     11/27/2023    K 3.7 11/27/2023     11/27/2023    CREATININE 1.0 11/27/2023    BUN 20 11/27/2023    CO2 25 11/27/2023    TSH 1.913 10/17/2019    INR 0.9 01/31/2021    HGBA1C 6.0 (H) 01/31/2021       Imaging: previous imaging has been reviewed    Assessment and Plan:     Mrs. Mac is a 73 year old female with AML in second remission.     AML  -- Admitted from [facility] on 5/25/18 early AM with WBC around 100s, for suspected new non-M3 AML  --lo res HLA typing on 5/26/18 and hi res on 5/27/18 done in anticipation of possible need for future transplant   --Pt with two daughters, two full sisters (in their mid 60s, one with brain aneurysm hx, other one without any medical issues), and one full brother (61 y/o healthy).  --NPM1 +, CEBPA (-), FLT3 (+).  On Midostaurin 50 mg PO BID until Day 14 (held starting 6/8 to 6/11). Stopped when FLAG JENNIFER re-induction started. Restarted Midostaurin at 25 mg bid on day 8 of FLAG JENNIFER induction (6/22), increase to full dose 50 mg bid (6/26) as improvement in liver enzymes. Stopped 7/3/18 due to abnormal cardiac echo.  --day 14 bone marrow biopsy completed 6/11 showing persistent disease with 15% CD 34 positive blasts  --Day 60 of FLAG-JENNIFER, tolerating without difficulty except nausea/vomiting  --day 14 restaging bone marrow biopsy  done 6/29 without complication, results with no evidence of AML, FLT 3 negative, will need repeat marrow at count recovery outpatient   --recovery marrow showed no evidence of disease  --HiDAC #1 on 8/14/18, HiDAC#2 on 9/11/18  --Cycle 3 delayed 1 week due to pancytopenia. Started on 10/16/18  --recovery bone marrow showed no signs of residual leukemia  --Bone marrow biopsy done last week reveals early relapse. 11% blasts. We discussed the possibility of re-induction, but she is adamantly against. She does not want to be admitted to the hospital.  --Plan for single agent Gilteritinib 120mg has changed based on next gen sequencing that reports FLT-3 negative at this time  --Completed 4 cycles of Decitabine + Venetoclax  --Most recent marrow shows no evidence of disease  --Due to evolving bruises and decline in counts plan for repeat bone marrow biopsy under sedation    Traumatic Head injury  --CT head showed no evidence of a bleed  --repeat imaging in 4 weeks to ensure there is not a delayed bleed    Neutropenia  --Evolving over the last several weeks  --Likely due to acute illness  --No others or blasts seen on differential  --Will continue to monitor with a low threshold to do flow cytometry or bone marrow biopsy to fully assess state of disease      Abnormal Liver Function Tests  --ALT and AST and Alk phos have again increased substantially since re-starting midostaurin.   --midostaurin started day 8 at 25 mg bid, monitoring liver enzymes closely and improved. Increasing Midostaurin to 50 mg bid 6/26. Stopped Midostaurin (7/3) due to new diagnosis of systolic heart failure EF 35%.    --permanently discontinued midostaurin   --LFTs fluctuate with tylenol use    Chemotherapy induced cardiomyopathy  --cardiology follow-up as outpatient in 3 months  --repeat 2D echo on 6/15 with preserved EF of 60%.  However 7/2/18 echo with reduced EF 30-35%  --Echo on 8/15/18 was within normal range EF of 60-65%    Rheumatologic  issues  --Previously on steroids and methotrexate  --Now on single agent low dose steroids [she self adjusts this medication]  --previously worked with [facility] [place] rheumatology      30 minutes were spent face to face with the patient and her family to discuss the disease, natural history, treatment options and survival statistics. I have provided the patient with an opportunity to ask questions and have all questions answered to her satisfaction.      she will return to the clinic 1 week after her bone marrow biopsy, but knows to call in the interim if symptoms change or should a problem arise.        Laquita Troy MD  Hematology and Medical Oncology  Bone Marrow Transplant  [facility]             BMT Chart Routing  Urgent    Follow up with physician . 1. bone marrow in endo 2.see me 1 week after marrow with cbc,cmp [okay to over book a 1pm spot]   Follow up with [facility]    Provider visit type    Infusion scheduling note    Injection scheduling note    Labs    Imaging    Pharmacy appointment    Other referrals

## 2023-11-28 ENCOUNTER — PATIENT MESSAGE (OUTPATIENT)
Dept: HEMATOLOGY/ONCOLOGY | Facility: CLINIC | Age: 73
End: 2023-11-28
Payer: MEDICARE

## 2023-11-28 NOTE — TELEPHONE ENCOUNTER
Yes to both vaccines. If she got covid today, have her wait about 2 weeks before rsv and then another 2 weeks befroe redoing the pneumonia vac

## 2023-12-04 ENCOUNTER — TELEPHONE (OUTPATIENT)
Dept: ENDOSCOPY | Facility: HOSPITAL | Age: 73
End: 2023-12-04
Payer: MEDICARE

## 2023-12-04 ENCOUNTER — TELEPHONE (OUTPATIENT)
Dept: HEMATOLOGY/ONCOLOGY | Facility: CLINIC | Age: 73
End: 2023-12-04
Payer: MEDICARE

## 2023-12-04 DIAGNOSIS — C92.02 AML (ACUTE MYELOID LEUKEMIA) IN RELAPSE: Primary | ICD-10-CM

## 2023-12-13 ENCOUNTER — ANESTHESIA EVENT (OUTPATIENT)
Dept: ENDOSCOPY | Facility: HOSPITAL | Age: 73
End: 2023-12-13
Payer: MEDICARE

## 2023-12-13 ENCOUNTER — ANESTHESIA (OUTPATIENT)
Dept: ENDOSCOPY | Facility: HOSPITAL | Age: 73
End: 2023-12-13
Payer: MEDICARE

## 2023-12-13 ENCOUNTER — HOSPITAL ENCOUNTER (OUTPATIENT)
Facility: HOSPITAL | Age: 73
Discharge: HOME | End: 2023-12-13
Attending: INTERNAL MEDICINE | Admitting: INTERNAL MEDICINE
Payer: MEDICARE

## 2023-12-13 VITALS
RESPIRATION RATE: 17 BRPM | HEART RATE: 72 BPM | TEMPERATURE: 98 F | HEIGHT: 64 IN | BODY MASS INDEX: 34.15 KG/M2 | OXYGEN SATURATION: 95 % | SYSTOLIC BLOOD PRESSURE: 118 MMHG | WEIGHT: 200 LBS | DIASTOLIC BLOOD PRESSURE: 59 MMHG

## 2023-12-13 DIAGNOSIS — C92.00 AML (ACUTE MYELOID LEUKEMIA): ICD-10-CM

## 2023-12-13 DIAGNOSIS — C92.02 ACUTE MYELOID LEUKEMIA IN RELAPSE: Primary | ICD-10-CM

## 2023-12-13 PROCEDURE — 88313 SPECIAL STAINS GROUP 2: CPT | Mod: 26,,, | Performed by: PATHOLOGY

## 2023-12-13 PROCEDURE — 88341 IMHCHEM/IMCYTCHM EA ADD ANTB: CPT | Mod: 26,59,, | Performed by: PATHOLOGY

## 2023-12-13 PROCEDURE — 88184 FLOWCYTOMETRY/ TC 1 MARKER: CPT | Performed by: PATHOLOGY

## 2023-12-13 PROCEDURE — 81310 NPM1 GENE: CPT | Mod: 59 | Performed by: NURSE PRACTITIONER

## 2023-12-13 PROCEDURE — 88185 FLOWCYTOMETRY/TC ADD-ON: CPT | Performed by: PATHOLOGY

## 2023-12-13 PROCEDURE — 88341 PR IHC OR ICC EACH ADD'L SINGLE ANTIBODY  STAINPR: ICD-10-PCS | Mod: 26,59,, | Performed by: PATHOLOGY

## 2023-12-13 PROCEDURE — 88342 IMHCHEM/IMCYTCHM 1ST ANTB: CPT | Mod: 59 | Performed by: PATHOLOGY

## 2023-12-13 PROCEDURE — 85097 PR  BONE MARROW,SMEAR INTERPRETATION: ICD-10-PCS | Mod: ,,, | Performed by: PATHOLOGY

## 2023-12-13 PROCEDURE — 37000009 HC ANESTHESIA EA ADD 15 MINS: Performed by: INTERNAL MEDICINE

## 2023-12-13 PROCEDURE — 81450 HL NEO GSAP 5-50DNA/DNA&RNA: CPT | Performed by: NURSE PRACTITIONER

## 2023-12-13 PROCEDURE — 85097 BONE MARROW INTERPRETATION: CPT | Mod: ,,, | Performed by: PATHOLOGY

## 2023-12-13 PROCEDURE — 37000008 HC ANESTHESIA 1ST 15 MINUTES: Performed by: INTERNAL MEDICINE

## 2023-12-13 PROCEDURE — 88189 PR  FLOWCYTOMETRY/READ, 16 & > MARKERS: ICD-10-PCS | Mod: ,,, | Performed by: PATHOLOGY

## 2023-12-13 PROCEDURE — 88313 SPECIAL STAINS GROUP 2: CPT | Performed by: PATHOLOGY

## 2023-12-13 PROCEDURE — 88311 PR  DECALCIFY TISSUE: ICD-10-PCS | Mod: 26,,, | Performed by: PATHOLOGY

## 2023-12-13 PROCEDURE — 88342 IMHCHEM/IMCYTCHM 1ST ANTB: CPT | Mod: 26,59,, | Performed by: PATHOLOGY

## 2023-12-13 PROCEDURE — 88341 IMHCHEM/IMCYTCHM EA ADD ANTB: CPT | Mod: 59 | Performed by: PATHOLOGY

## 2023-12-13 PROCEDURE — 38222 DX BONE MARROW BX & ASPIR: CPT | Performed by: INTERNAL MEDICINE

## 2023-12-13 PROCEDURE — 38222 BONE MARROW: ICD-10-PCS | Mod: LT,,, | Performed by: NURSE PRACTITIONER

## 2023-12-13 PROCEDURE — 25000003 PHARM REV CODE 250: Performed by: NURSE ANESTHETIST, CERTIFIED REGISTERED

## 2023-12-13 PROCEDURE — 88311 DECALCIFY TISSUE: CPT | Mod: 26,,, | Performed by: PATHOLOGY

## 2023-12-13 PROCEDURE — 88271 CYTOGENETICS DNA PROBE: CPT | Performed by: NURSE PRACTITIONER

## 2023-12-13 PROCEDURE — 88237 TISSUE CULTURE BONE MARROW: CPT | Performed by: NURSE PRACTITIONER

## 2023-12-13 PROCEDURE — 88342 CHG IMMUNOCYTOCHEMISTRY: ICD-10-PCS | Mod: 26,59,, | Performed by: PATHOLOGY

## 2023-12-13 PROCEDURE — E9220 PRA ENDO ANESTHESIA: ICD-10-PCS | Mod: ,,, | Performed by: NURSE ANESTHETIST, CERTIFIED REGISTERED

## 2023-12-13 PROCEDURE — 88305 TISSUE EXAM BY PATHOLOGIST: CPT | Performed by: PATHOLOGY

## 2023-12-13 PROCEDURE — 88275 CYTOGENETICS 100-300: CPT | Mod: 59 | Performed by: NURSE PRACTITIONER

## 2023-12-13 PROCEDURE — 63600175 PHARM REV CODE 636 W HCPCS: Performed by: NURSE ANESTHETIST, CERTIFIED REGISTERED

## 2023-12-13 PROCEDURE — E9220 PRA ENDO ANESTHESIA: HCPCS | Mod: ,,, | Performed by: NURSE ANESTHETIST, CERTIFIED REGISTERED

## 2023-12-13 PROCEDURE — 88189 FLOWCYTOMETRY/READ 16 & >: CPT | Mod: ,,, | Performed by: PATHOLOGY

## 2023-12-13 PROCEDURE — 88305 TISSUE EXAM BY PATHOLOGIST: CPT | Mod: 26,,, | Performed by: PATHOLOGY

## 2023-12-13 PROCEDURE — 88305 TISSUE EXAM BY PATHOLOGIST: ICD-10-PCS | Mod: 26,,, | Performed by: PATHOLOGY

## 2023-12-13 PROCEDURE — 88313 PR  SPECIAL STAINS,GROUP II: ICD-10-PCS | Mod: 26,,, | Performed by: PATHOLOGY

## 2023-12-13 PROCEDURE — 88264 CHROMOSOME ANALYSIS 20-25: CPT | Performed by: NURSE PRACTITIONER

## 2023-12-13 PROCEDURE — 88311 DECALCIFY TISSUE: CPT | Performed by: PATHOLOGY

## 2023-12-13 PROCEDURE — 88299 UNLISTED CYTOGENETIC STUDY: CPT | Performed by: NURSE PRACTITIONER

## 2023-12-13 PROCEDURE — 38222 DX BONE MARROW BX & ASPIR: CPT | Mod: LT,,, | Performed by: NURSE PRACTITIONER

## 2023-12-13 RX ORDER — PROPOFOL 10 MG/ML
VIAL (ML) INTRAVENOUS
Status: DISCONTINUED | OUTPATIENT
Start: 2023-12-13 | End: 2023-12-13

## 2023-12-13 RX ORDER — ONDANSETRON 2 MG/ML
INJECTION INTRAMUSCULAR; INTRAVENOUS
Status: DISCONTINUED | OUTPATIENT
Start: 2023-12-13 | End: 2023-12-13

## 2023-12-13 RX ORDER — SODIUM CHLORIDE 9 MG/ML
INJECTION, SOLUTION INTRAVENOUS CONTINUOUS
Status: DISCONTINUED | OUTPATIENT
Start: 2023-12-13 | End: 2023-12-13 | Stop reason: HOSPADM

## 2023-12-13 RX ORDER — PHENYLEPHRINE HYDROCHLORIDE 10 MG/ML
INJECTION INTRAVENOUS
Status: DISCONTINUED | OUTPATIENT
Start: 2023-12-13 | End: 2023-12-13

## 2023-12-13 RX ORDER — LIDOCAINE HYDROCHLORIDE 20 MG/ML
INJECTION INTRAVENOUS
Status: DISCONTINUED | OUTPATIENT
Start: 2023-12-13 | End: 2023-12-13

## 2023-12-13 RX ADMIN — PROPOFOL 50 MG: 10 INJECTION, EMULSION INTRAVENOUS at 09:12

## 2023-12-13 RX ADMIN — PROPOFOL 50 MG: 10 INJECTION, EMULSION INTRAVENOUS at 10:12

## 2023-12-13 RX ADMIN — ONDANSETRON 4 MG: 2 INJECTION INTRAMUSCULAR; INTRAVENOUS at 09:12

## 2023-12-13 RX ADMIN — SODIUM CHLORIDE: 9 INJECTION, SOLUTION INTRAVENOUS at 09:12

## 2023-12-13 RX ADMIN — PHENYLEPHRINE HYDROCHLORIDE 100 MCG: 10 INJECTION INTRAVENOUS at 10:12

## 2023-12-13 RX ADMIN — LIDOCAINE HYDROCHLORIDE 50 MG: 20 INJECTION INTRAVENOUS at 09:12

## 2023-12-13 NOTE — DISCHARGE SUMMARY
[facility] 4th Floor  Hematology/Oncology  Discharge Summary      Patient Name: Sabrina Mac  MRN: [id number]  Admission Date: 12/13/2023  Hospital Length of Stay: 0 days  Discharge Date and Time: 12/13/2023 11:05 AM  Attending Physician: Earnestine att. providers found   Discharging Provider: Sweetie Lepe NP  Primary Care Provider: Earnestine, Primary Doctor    HPI: 73 year old female presents for bone marrow aspiration and biopsy with sedation for evaluation of worsening anemia and thrombocytopenia/suspected relapsed AML.    Procedure(s) (LRB):  Biopsy-bone marrow (N/A)     Hospital Course: Patient admitted to endoscopy today for a bone marrow aspiration and biopsy. Consent was obtained for a bone marrow biopsy. Patient was sedated per anesthesia and a bone marrow biopsy and aspiration was performed in endo suite 5. Patient was then transferred to post op and discharged home when appropriate per anesthesia.     Pending Diagnostic Studies:       Procedure Component Value Units Date/Time    AML FISH, Bone Marrow (Ages over 30 yrs) [[id number]] Collected: 12/13/23 0946    Order Status: Sent Lab Status: In process Updated: 12/13/23 1209    Specimen: Bone Marrow     Heme Disorders DNA/RNA Hold, Bone Marrow [[id number]] Collected: 12/13/23 0945    Order Status: Sent Lab Status: In process Updated: 12/13/23 1208    Specimen: Bone Marrow     Specimen to Pathology, Bone Marrow Aspiration/Biopsy [[id number]] Collected: 12/13/23 0946    Order Status: Sent Lab Status: In process Updated: 12/13/23 1100    Specimen: Bone Marrow           There are no hospital problems to display for this patient.     Discharged Condition: good    Disposition: Home or Self Care    Follow Up:   Future Appointments   Date Time Provider Department Center   12/20/2023 12:10 PM LAB, HEMONC CANCER BLDG [facility]  [facility]   12/20/2023  1:00 PM Laquita Troy MD [facility] B[facility]        Patient Instructions:      Notify your health  care provider if you experience any of the following:  temperature >100.4     Notify your health care provider if you experience any of the following:  persistent nausea and vomiting or diarrhea     Notify your health care provider if you experience any of the following:  severe uncontrolled pain     Notify your health care provider if you experience any of the following:  redness, tenderness, or signs of infection (pain, swelling, redness, odor or green/yellow discharge around incision site)     Notify your health care provider if you experience any of the following:  difficulty breathing or increased cough     Notify your health care provider if you experience any of the following:  severe persistent headache     Notify your health care provider if you experience any of the following:  persistent dizziness, light-headedness, or visual disturbances     Remove dressing in 24 hours     Activity as tolerated     Shower on day dressing removed (No bath)     Medications:  Reconciled Home Medications:      Medication List        Ask your doctor about these medications      ALPRAZolam 2 MG Tab  Commonly known as: XANAX  TAKE ONE TABLET BY MOUTH NIGHTLY AS NEEDED     EScitalopram oxalate 20 MG tablet  Commonly known as: LEXAPRO  TAKE ONE TABLET BY MOUTH EACH EVENING     methocarbamoL 500 MG Tab  Commonly known as: ROBAXIN  Take 1 tablet (500 mg total) by mouth 4 (four) times daily.     metoprolol succinate 25 MG 24 hr tablet  Commonly known as: TOPROL-XL  Take 1 tablet (25 mg total) by mouth once daily.     nitrofurantoin (macrocrystal-monohydrate) 100 MG capsule  Commonly known as: MACROBID  Take 100 mg by mouth 2 (two) times daily.     omeprazole 20 MG capsule  Commonly known as: PRILOSEC  Take 1 capsule (20 mg total) by mouth 2 (two) times daily before meals.     ondansetron 8 MG tablet  Commonly known as: ZOFRAN  Take 1 tablet (8 mg total) by mouth every 12 (twelve) hours as needed for Nausea.     ondansetron 8 MG  Tbdl  Commonly known as: ZOFRAN-ODT  Take 8 mg by mouth 3 (three) times daily.     * oxyCODONE 20 mg Tab immediate release tablet  Commonly known as: ROXICODONE  Take 1 tablet (20 mg total) by mouth every 6 (six) hours as needed (pain).     * oxyCODONE 10 mg Tab immediate release tablet  Commonly known as: ROXICODONE  Take 1 tablet (10 mg total) by mouth every 6 (six) hours as needed (pain).     * oxyCODONE 10 mg Tab immediate release tablet  Commonly known as: ROXICODONE  Take 1 tablet (10 mg total) by mouth every 4 (four) hours as needed for Pain.     predniSONE 20 MG tablet  Commonly known as: DELTASONE  TAKE ONE TABLET BY MOUTH ONCE DAILY AS NEEDED     tiZANidine 4 MG tablet  Commonly known as: ZANAFLEX  Take 1 tablet (4 mg total) by mouth 3 (three) times daily.      * This list has 3 medication(s) that are the same as other medications prescribed for you. Read the directions carefully, and ask your doctor or other care provider to review them with you.         Sweetie Lepe NP  Hematology/Oncology  [facility] 4th Floor

## 2023-12-13 NOTE — ANESTHESIA PREPROCEDURE EVALUATION
"               Pre-operative evaluation for Procedure(s) (LRB):  Biopsy-bone marrow (N/A)    Sabrina Mac is a 73 y.o. female w/ hx of AML in relapse, chronic pain, and reports an episode of Vfib >10yrs ago which is now "controlled with metoprolol". She presents for bone marrow bx.      Prev airway (2/1/2021):     Induction:  Intravenous    Intubated:  Postinduction    Mask Ventilation:  Easy mask    Attempts:  1    Attempted By:  CRNA    Method of Intubation:  Direct    Blade:  Ni 2    Laryngeal View Grade: Grade I - full view of chords      Difficult Airway Encountered?: No      Complications:  None    Airway Device:  Oral endotracheal tube    Airway Device Size:  7.0    Style/Cuff Inflation:  Cuffed (inflated to minimal occlusive pressure)    Tube secured:  21    Secured at:  The lips    Placement Verified By:  Capnometry    Complicating Factors:  None    Findings Post-Intubation:  BS equal bilateral    EKG (1/31/2021):   Vent. Rate : 084 BPM     Atrial Rate : 084 BPM      P-R Int : 160 ms          QRS Dur : 074 ms       QT Int : 402 ms       P-R-T Axes : 019 -21 050 degrees      QTc Int : 475 ms   Normal sinus rhythm   Normal ECG     2D Echo (10/6/2020):   There is left ventricular concentric remodeling.  The estimated PA systolic pressure is 18 mmHg.  The left ventricle is normal in size with normal systolic function. The estimated ejection fraction is 60%.  Normal left ventricular diastolic function.  Normal right ventricular systolic function.  Moderate tricuspid regurgitation.  Patient Active Problem List   Diagnosis    Acute monocytic leukemia in remission    Depression with anxiety    Osteoporosis    Insomnia    Pulmonary nodule    Staphylococcus epidermidis bacteremia    MCTD (mixed connective tissue disease)    Pancytopenia due to chemotherapy    Electrolyte abnormality    CINV (chemotherapy-induced nausea and vomiting)    Chemotherapy induced cardiomyopathy    Acute back pain    History of " Clostridium difficile infection    AML (acute myeloblastic leukemia)    Elevated LFTs    Chronic pain    Mucositis due to chemotherapy    History of ventricular fibrillation    AML (acute myeloid leukemia)    Acute encephalopathy    Left ear pain    Lethargy    Symptomatic anemia    Sepsis    Closed displaced fracture of right femoral neck    Acute kidney injury    History of cardiomyopathy    Visual discomfort of both eyes    Impaired mobility and ADLs    AML (acute myeloid leukemia) in relapse    Traumatic head injury less than 3 months ago       Review of patient's allergies indicates:   Allergen Reactions    Methotrexate analogues      Elevated Liver Enzyme    Sulfa (sulfonamide antibiotics)      Rash, nausea, vomiting    Bactrim [sulfamethoxazole-trimethoprim] Nausea And Vomiting and Rash        No current facility-administered medications on file prior to encounter.     Current Outpatient Medications on File Prior to Encounter   Medication Sig Dispense Refill    ALPRAZolam (XANAX) 2 MG Tab TAKE ONE TABLET BY MOUTH NIGHTLY AS NEEDED 60 tablet 0    EScitalopram oxalate (LEXAPRO) 20 MG tablet TAKE ONE TABLET BY MOUTH EACH EVENING 30 tablet 5    methocarbamoL (ROBAXIN) 500 MG Tab Take 1 tablet (500 mg total) by mouth 4 (four) times daily. 90 tablet 0    metoprolol succinate (TOPROL-XL) 25 MG 24 hr tablet Take 1 tablet (25 mg total) by mouth once daily. 30 tablet 11    nitrofurantoin, macrocrystal-monohydrate, (MACROBID) 100 MG capsule Take 100 mg by mouth 2 (two) times daily.      omeprazole (PRILOSEC) 20 MG capsule Take 1 capsule (20 mg total) by mouth 2 (two) times daily before meals. 120 capsule 6    ondansetron (ZOFRAN) 8 MG tablet Take 1 tablet (8 mg total) by mouth every 12 (twelve) hours as needed for Nausea. 60 tablet 3    ondansetron (ZOFRAN-ODT) 8 MG TbDL Take 8 mg by mouth 3 (three) times daily.      oxyCODONE (ROXICODONE) 10 mg Tab immediate release tablet Take 1 tablet (10 mg total) by mouth every 6  "(six) hours as needed (pain). 60 tablet 0    oxyCODONE (ROXICODONE) 10 mg Tab immediate release tablet Take 1 tablet (10 mg total) by mouth every 4 (four) hours as needed for Pain. 60 tablet 0    oxyCODONE (ROXICODONE) 20 mg Tab immediate release tablet Take 1 tablet (20 mg total) by mouth every 6 (six) hours as needed (pain). 60 tablet 0    predniSONE (DELTASONE) 20 MG tablet TAKE ONE TABLET BY MOUTH ONCE DAILY AS NEEDED 30 tablet 3    tiZANidine (ZANAFLEX) 4 MG tablet Take 1 tablet (4 mg total) by mouth 3 (three) times daily. 90 tablet 1       Past Surgical History:   Procedure Laterality Date    BONE MARROW BIOPSY Left 2018    Procedure: Biopsy-bone marrow;  Surgeon: Laquita Troy MD;  Location: [address];  Service: Orthopedics;  Laterality: Left;    BONE MARROW BIOPSY Left 2019    Procedure: Biopsy-bone marrow;  Surgeon: Laquita Troy MD;  Location: [facility] OR [address] [place] [address];  Service: Orthopedics;  Laterality: Left;    CHOLECYSTECTOMY      HEMIARTHROPLASTY OF HIP Right 2021    Procedure: HEMIARTHROPLASTY, HIP (right) - Jonathan, peg board;  Surgeon: Hector Miller MD;  Location: [address];  Service: Orthopedics;  Laterality: Right;    HYSTERECTOMY      TONSILLECTOMY         Social History     Socioeconomic History    Marital status:    Tobacco Use    Smoking status: Former     Current packs/day: 0.00     Average packs/day: 0.5 packs/day for 10.0 years (5.0 ttl pk-yrs)     Types: Cigarettes     Start date: 3/25/2007     Quit date: 3/25/2017     Years since quittin.7    Smokeless tobacco: Never   Substance and Sexual Activity    Alcohol use: Not Currently     Alcohol/week: 1.0 standard drink of alcohol     Types: 1 Shots of liquor per week    Drug use: No    Sexual activity: Yes     Partners: Female         Vital Signs Range (Last 24H):         CBC: No results for input(s): "WBC", "RBC", "HGB", "HCT", "PLT", "MCV", "MCH", "MCHC" in the last 72 hours.    CMP: No results for " "input(s): "NA", "K", "CL", "CO2", "BUN", "CREATININE", "GLU", "MG", "PHOS", "CALCIUM", "ALBUMIN", "PROT", "ALKPHOS", "ALT", "AST", "BILITOT" in the last 72 hours.    INR  No results for input(s): "PT", "INR", "PROTIME", "APTT" in the last 72 hours.            Pre-op Assessment    I have reviewed the Patient Summary Reports.     I have reviewed the Nursing Notes. I have reviewed the NPO Status.   I have reviewed the Medications.     Review of Systems  Anesthesia Hx:  No problems with previous Anesthesia             Denies Family Hx of Anesthesia complications.    Denies Personal Hx of Anesthesia complications.                    Hematology/Oncology:                      Current/Recent Cancer.                Cardiovascular:  Exercise tolerance: good   Denies Pacemaker.   Denies Valvular problems/Murmurs.  Denies MI.        CHF       ECG has been reviewed.                          Renal/:  Chronic Renal Disease                Hepatic/GI:  Hepatic/GI Normal                 Neurological:    Denies CVA.    Denies Seizures.          Chronic Pain Syndrome                         Endocrine:  Endocrine Normal          Obesity / BMI > 30  Psych:  Psychiatric History                  Physical Exam  General: Alert and Oriented    Airway:  Mallampati: III   Mouth Opening: Normal  TM Distance: Normal  Tongue: Normal    Dental:  Edentulous    Chest/Lungs:  Clear to auscultation, Normal Respiratory Rate    Heart:  Rate: Normal  Rhythm: Regular Rhythm        Anesthesia Plan  Type of Anesthesia, risks & benefits discussed:    Anesthesia Type: Gen Natural Airway  Intra-op Monitoring Plan: Standard ASA Monitors  Post Op Pain Control Plan: multimodal analgesia  Induction:  IV  Informed Consent: Informed consent signed with the Patient and all parties understand the risks and agree with anesthesia plan.  All questions answered.   ASA Score: 3  Day of Surgery Review of History & Physical: H&P Update referred to the " surgeon/provider.    Ready For Surgery From Anesthesia Perspective.     .

## 2023-12-13 NOTE — PROCEDURES
Bone marrow    Date/Time: 12/13/2023 8:48 AM    Performed by: Sweetie Lepe NP  Authorized by: Sweetie Lepe NP    Aspiration?: Yes   Biopsy?: Yes      PROCEDURE NOTE:  Bone Marrow aspiration and biopsy  Indication: restaging AML, possible relapse  Consent: Informed consent was obtained from patient.  Timeout: Done and documented.  Position: right lateral  Site: left posterior illiac crest.  Prep: Betadine.  Needle used: 11 gauge Jamshidi needle.  Anesthetic: 2% lidocaine 5 cc.  Biopsy: The biopsy needle was introduced into the marrow cavity and 25 cc's of aspirate was obtained without complications and sent for flow, cytogenetics, DNA hold, NGS and AML FISH. Core biopsy obtained x 2 without difficulty and sent for routine histologic examination.  Complications: None.  EBL: Minimal  Disposition: The patient was discharged home per anesthesia protocol.     Sweetie Lepe NP  Hematology/BMT

## 2023-12-13 NOTE — DISCHARGE INSTRUCTIONS
DERMATOLOGY initial VISIT NOTE    Verbal permission granted by patient to leave a detailed message with medical information on answering machine at phone number listed in Epic? yes    If female, are you pregnant, trying to become pregnant, or breastfeeding? No      PAST MEDICAL HISTORY:  []  None  []  Melanoma    [x]  Asthma []  Skin Cancer  [x]  Eczema []  Keloids  []  Allergies []  Psoriasis  []  Vitiligo  []  Alopecia areata      REVIEW OF SYSTEMS:  Yes No  []  [x]  Headaches/vision changes  []  [x]  Fevers/chills/sweats   []  [x]  Unintentional weight loss  []  [x]  Sore throat  []  [x]  Runny nose/cough  []  [x]  Nausea/vomiting  []  [x]  Abdominal pain  []  [x]  Chest pain  []  [x]  Difficulty breathing  []  [x]  Easy bruising or bleeding  []  [x]  Diarrhea/constipation               Discharge instructions for having a Bone Marrow Aspiration / Biopsy    Keep Bandage in place for 24 hours.  - Do not shower or take a tube bath during this time. (you may sponge bathe).  - Call the nurse or physician for excessive bleeding or pain at biopsy site.  - You may take Tylenol as needed for pain.    You have received medication to sedate you.  - Do not drive a car or operate heavy machinery for the rest of the day.  - You may resume other activities as tolerated.    You can call [phone] for any problems during the hours of 8:00 AM-5:00PM.    For an emergency after 5:00 PM you can call [phone] and have the  page the Hematologist / Oncologist on call.

## 2023-12-13 NOTE — ANESTHESIA POSTPROCEDURE EVALUATION
Anesthesia Post Evaluation    Patient: Sabrina Mac    Procedure(s) Performed: Procedure(s) (LRB):  Biopsy-bone marrow (N/A)    Final Anesthesia Type: general      Patient location during evaluation: GI PACU  Patient participation: Yes- Able to Participate  Level of consciousness: awake  Post-procedure vital signs: reviewed and stable  Pain management: adequate  Airway patency: patent    PONV status at discharge: No PONV  Anesthetic complications: no      Cardiovascular status: blood pressure returned to baseline  Respiratory status: unassisted, spontaneous ventilation and room air                Vitals Value Taken Time   /59 12/13/23 1050   Temp 36.6 °C (97.9 °F) 12/13/23 1020   Pulse 72 12/13/23 1050   Resp 17 12/13/23 1050   SpO2 95 % 12/13/23 1050         Event Time   Out of Recovery 10:55:40         Pain/Tariq Score: Tariq Score: 10 (12/13/2023 10:50 AM)

## 2023-12-13 NOTE — TRANSFER OF CARE
"Anesthesia Transfer of Care Note    Patient: Sabrina Mac    Procedure(s) Performed: Procedure(s) (LRB):  Biopsy-bone marrow (N/A)    Patient location: PACU    Transport from OR: Transported from OR on room air with adequate spontaneous ventilation    Post pain: adequate analgesia    Post assessment: no apparent anesthetic complications    Post vital signs: stable    Level of consciousness: awake and alert    Nausea/Vomiting: no nausea/vomiting    Complications: none    Transfer of care protocol was followed      Last vitals: Visit Vitals  BP 97/60(BP Location: Left arm, Patient Position: Lying)   Pulse 79   Temp 36.6 °C (97.9 °F) (Temporal)   Resp 16   Ht 5' 4" (1.626 m)   Wt 90.7 kg (200 lb)   LMP  (LMP Unknown)   SpO2 96%   Breastfeeding No   BMI 34.33 kg/m²     "

## 2023-12-15 ENCOUNTER — PATIENT MESSAGE (OUTPATIENT)
Dept: HEMATOLOGY/ONCOLOGY | Facility: CLINIC | Age: 73
End: 2023-12-15
Payer: MEDICARE

## 2023-12-15 LAB
DNA/RNA EXTRACT AND HOLD RESULT: NORMAL
DNA/RNA EXTRACTION: NORMAL
EXHR SPECIMEN TYPE: NORMAL

## 2023-12-18 LAB
AML FISH REASON FOR REFERRAL (BM): NORMAL
ANNOTATION COMMENT IMP: NORMAL
CELLS W CYTOGENETIC ABNL BLD/T: NORMAL
CHROM ANALY RESULT (ISCN): NORMAL
CHROM BANDING METHOD: NORMAL
CHROMOSOME ANALYSIS BM ADDITIONAL INFORMATION: NORMAL
CHROMOSOME ANALYSIS BM RELEASED BY: NORMAL
CHROMOSOME ANALYSIS BM RESULT SUMMARY: NORMAL
CLINICAL CYTOGENETICIST REVIEW: NORMAL
CLINICAL CYTOGENETICIST REVIEW: NORMAL
KARYOTYP MAR: NORMAL
LAB TEST METHOD: NORMAL
MOL DX INTERP BLD/T QL: NORMAL
PROVIDER SIGNING NAME: NORMAL
REASON FOR REFERRAL (NARRATIVE): NORMAL
REF LAB TEST METHOD: NORMAL
SPECIMEN SOURCE: NORMAL
SPECIMEN SOURCE: NORMAL
SPECIMEN: NORMAL
TEST PERFORMANCE INFO SPEC: NORMAL

## 2023-12-20 ENCOUNTER — OFFICE VISIT (OUTPATIENT)
Dept: HEMATOLOGY/ONCOLOGY | Facility: CLINIC | Age: 73
End: 2023-12-20
Payer: MEDICARE

## 2023-12-20 ENCOUNTER — LAB VISIT (OUTPATIENT)
Dept: LAB | Facility: HOSPITAL | Age: 73
End: 2023-12-20
Payer: MEDICARE

## 2023-12-20 VITALS
OXYGEN SATURATION: 96 % | RESPIRATION RATE: 20 BRPM | BODY MASS INDEX: 35 KG/M2 | WEIGHT: 205 LBS | TEMPERATURE: 98 F | HEART RATE: 81 BPM | HEIGHT: 64 IN | SYSTOLIC BLOOD PRESSURE: 121 MMHG | DIASTOLIC BLOOD PRESSURE: 58 MMHG

## 2023-12-20 DIAGNOSIS — M35.1 MCTD (MIXED CONNECTIVE TISSUE DISEASE): ICD-10-CM

## 2023-12-20 DIAGNOSIS — C92.02 ACUTE MYELOID LEUKEMIA IN RELAPSE: Primary | ICD-10-CM

## 2023-12-20 DIAGNOSIS — C92.02 ACUTE MYELOID LEUKEMIA IN RELAPSE: ICD-10-CM

## 2023-12-20 DIAGNOSIS — R79.89 ELEVATED LFTS: ICD-10-CM

## 2023-12-20 LAB
ALBUMIN SERPL BCP-MCNC: 3 G/DL (ref 3.5–5.2)
ALP SERPL-CCNC: 97 U/L (ref 55–135)
ALT SERPL W/O P-5'-P-CCNC: 57 U/L (ref 10–44)
ANION GAP SERPL CALC-SCNC: 10 MMOL/L (ref 8–16)
ANISOCYTOSIS BLD QL SMEAR: SLIGHT
AST SERPL-CCNC: 25 U/L (ref 10–40)
BASOPHILS NFR BLD: 0 % (ref 0–1.9)
BILIRUB SERPL-MCNC: 0.9 MG/DL (ref 0.1–1)
BUN SERPL-MCNC: 17 MG/DL (ref 8–23)
CALCIUM SERPL-MCNC: 8.9 MG/DL (ref 8.7–10.5)
CHLORIDE SERPL-SCNC: 107 MMOL/L (ref 95–110)
CO2 SERPL-SCNC: 23 MMOL/L (ref 23–29)
CREAT SERPL-MCNC: 1 MG/DL (ref 0.5–1.4)
DACRYOCYTES BLD QL SMEAR: ABNORMAL
DIFFERENTIAL METHOD: ABNORMAL
EOSINOPHIL NFR BLD: 0 % (ref 0–8)
ERYTHROCYTE [DISTWIDTH] IN BLOOD BY AUTOMATED COUNT: 19.7 % (ref 11.5–14.5)
EST. GFR  (NO RACE VARIABLE): 59.9 ML/MIN/1.73 M^2
GLUCOSE SERPL-MCNC: 127 MG/DL (ref 70–110)
HCT VFR BLD AUTO: 31.6 % (ref 37–48.5)
HGB BLD-MCNC: 9.2 G/DL (ref 12–16)
IMM GRANULOCYTES # BLD AUTO: ABNORMAL K/UL (ref 0–0.04)
IMM GRANULOCYTES NFR BLD AUTO: ABNORMAL % (ref 0–0.5)
LYMPHOCYTES NFR BLD: 18 % (ref 18–48)
MCH RBC QN AUTO: 28 PG (ref 27–31)
MCHC RBC AUTO-ENTMCNC: 29.1 G/DL (ref 32–36)
MCV RBC AUTO: 96 FL (ref 82–98)
METAMYELOCYTES NFR BLD MANUAL: 1 %
MONOCYTES NFR BLD: 15 % (ref 4–15)
MYELOCYTES NFR BLD MANUAL: 1 %
NEUTROPHILS NFR BLD: 65 % (ref 38–73)
NRBC BLD-RTO: 6 /100 WBC
OVALOCYTES BLD QL SMEAR: ABNORMAL
PLATELET # BLD AUTO: 58 K/UL (ref 150–450)
PLATELET BLD QL SMEAR: ABNORMAL
PMV BLD AUTO: ABNORMAL FL (ref 9.2–12.9)
POIKILOCYTOSIS BLD QL SMEAR: SLIGHT
POLYCHROMASIA BLD QL SMEAR: ABNORMAL
POTASSIUM SERPL-SCNC: 4.1 MMOL/L (ref 3.5–5.1)
PROT SERPL-MCNC: 5.6 G/DL (ref 6–8.4)
RBC # BLD AUTO: 3.28 M/UL (ref 4–5.4)
SODIUM SERPL-SCNC: 140 MMOL/L (ref 136–145)
TARGETS BLD QL SMEAR: ABNORMAL
WBC # BLD AUTO: 1.98 K/UL (ref 3.9–12.7)

## 2023-12-20 PROCEDURE — 99999 PR PBB SHADOW E&M-EST. PATIENT-LVL III: CPT | Mod: PBBFAC,,, | Performed by: INTERNAL MEDICINE

## 2023-12-20 PROCEDURE — 99999 PR PBB SHADOW E&M-EST. PATIENT-LVL III: ICD-10-PCS | Mod: PBBFAC,,, | Performed by: INTERNAL MEDICINE

## 2023-12-20 PROCEDURE — 80053 COMPREHEN METABOLIC PANEL: CPT | Performed by: INTERNAL MEDICINE

## 2023-12-20 PROCEDURE — 36415 COLL VENOUS BLD VENIPUNCTURE: CPT | Performed by: INTERNAL MEDICINE

## 2023-12-20 PROCEDURE — 99213 OFFICE O/P EST LOW 20 MIN: CPT | Mod: PBBFAC | Performed by: INTERNAL MEDICINE

## 2023-12-20 PROCEDURE — 85007 BL SMEAR W/DIFF WBC COUNT: CPT | Performed by: INTERNAL MEDICINE

## 2023-12-20 PROCEDURE — 85027 COMPLETE CBC AUTOMATED: CPT | Performed by: INTERNAL MEDICINE

## 2023-12-20 PROCEDURE — 99215 OFFICE O/P EST HI 40 MIN: CPT | Mod: S$PBB,,, | Performed by: INTERNAL MEDICINE

## 2023-12-20 PROCEDURE — 99215 PR OFFICE/OUTPT VISIT, EST, LEVL V, 40-54 MIN: ICD-10-PCS | Mod: S$PBB,,, | Performed by: INTERNAL MEDICINE

## 2023-12-20 RX ORDER — ALPRAZOLAM 2 MG/1
TABLET ORAL
Qty: 120 TABLET | Refills: 0 | Status: SHIPPED | OUTPATIENT
Start: 2023-12-20

## 2023-12-20 RX ORDER — ACYCLOVIR 50 MG/G
OINTMENT TOPICAL
Qty: 30 G | Refills: 2 | Status: SHIPPED | OUTPATIENT
Start: 2023-12-20

## 2023-12-20 NOTE — PROGRESS NOTES
Hematology and Medical Oncology   Follow Up Note     12/21/2023    Primary Oncologic Diagnosis: AML, FLT 3 + and NPMN1+    History of Present Ilness:   Sabrina Badillo) is a pleasant 73 y.o.female presents to clinic following 2 induction therapies for AML. She was in the hospital roughly 50 days to receive 7+3 and then FLAG JENNIFER. Now on active observation.    Oncology History:   68 y.o. female admitted overnight for possible new AML. Came in from OSH with elevated WBC of 100.   05/25/2018: Pt is now on hydrea 2 grams BID, allopurinol 300 mg daily, and IVF. Pt may need rasburicase this weekend. She will undergo a BM bx and aspiration today. She is an induction candidate and will not be screened for research trials. She has anxiety for which she usually takes xanax, this was re-started.   05/26/2018 PICC placed. Reports she slept well last night. Anxiety improved with prn xanax. EF 65%, HIV and Hep panel negative. 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  6/12/2018: Day 15 of 7+3 induction, restaging BM bx done yesterday. On Midostaurin. Fever yesterday and BC with gram + cocci in clusters. On cefepime day 2 and will start Vanc today. Labial mucositis improving.   6/13/2018: Day 16 7+3. BC with staph aureus, identification pending. Surveillance blood cultures done today. Afebrile x 48 hours. On cefepime and Vanc. Labial mucositis resolved. No complaints of pain. Nausea controlled. No diarrhea. Primary complaint is fatigue.   6/14/20018: Day 17 of 7+3. Day 14 bone marrow results pending. BC with staph epidermis only sensitive to Vancomycin. Vanc day 3 and cefepime day 4. Vanc trough 12.2 last night. BP remains low but stable. Afebrile x 72 hours.   6/15/2018: Day 18 of 7+3. Day 14 bone marrow biopsy shows persistent disease with 15% blasts. Discussion with patient regarding treatment and she is deciding if she wants to proceed with re-induction vs outpatient maintenance. Temp of 100.4 overnight, unsustained.  Day 5 Cefepime and Day 4 of Vanc for staph epidermis. Repeat cultures NGTD. BP improved. Electrolytes (mag, phos, K and calcium) replaced aggressively this am and will repeat labs this afternoon. No complaints this am.   06/16/2018: Day 19 of 7+3. Day 2 of Flag-JENNIFER. Remains afebrile. Calcium remains low and have increased PO supplementation. Remains on vanc and aztreonam. Somewhat loopy feeling after receiving benadryl.   06/17/2018: Day 20 of 7+3. Day 3 of FLAG-JENNIFER. Multiple electrolyte abnormalities. New bilateral leg and feet swelling.   6/18/2018: Day 21 of 7+3 and Day 4 of FLAG JENNIFER. Tolerating well with some nausea controlled with Zofran. VSS, afebrile. ANC 1900 on Neupogen. Continues Vanc for staph epi bacteremia. Multiple electrolytes replaced today. Complains of edema to lower extremities, not improved after 20 of lasix yesterday. No sob or CP.   6/19/18: Day 22 of 7+3 and Day 5 of FLAG JENNIFER. VSS, afebrile, ANC 3900. Vanc trough elevated and dose adjusted this am. Lower extremity edema improved after 40 of lasix yesterday, net negative 300 cc I&O. Mild nausea, no abdominal pain or diarrhea. Poor appetite but no difficulty eating or taking pills.   6/20/18: Day 23 of 7+3 and Day 6 of FLAG JENNIFER. Patient complains of nausea and vomiting overnight and this am, especially when taking pills. Will add Zyprexa daily in addition to Zofran, compazine, and ativan PRN and will change meds to IV. 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 Afebrile.  No diarrhea, mouth sores or pain.  06/21/2018: Day 24 of 7+3 and Day 7 of FLAG JENNIFER. Nausea better controlled. Continued on Vanc.  6/22/2018: Day 25 of 7+3 and Day 8 of FLAG JENNIFER. Nausea improved some with Zyprexa but did have 1 episode of emesis overnight.continuing meds IV due to vomiting. Remains afebrile. ANC 0. Continues Vanc and Flagyl. Electrolytes replaced.   06/23/2018 : day 26 of 7+3 and Day 9 of FLAG  JENNIFER.  Nausea persists, worsened after taking pills so meds converted to IV.  Encouraged ambulation.  Continue with flagyl for leptotrichia goodfellowii.  RUQ US showed intrahepatic dilation, overall impression hepatic steatosis.  CBD 0.5cm.  No Gallbladder.    06/24/2018 : day 27 of 7+3 and Day 10 of FLAG JENNIFER.  Nausea persisting, able to tolerate some po pills.  Last day of flagyl (day 5).  Still pancytopenic. Appetite still low as po intake triggers nausea.  06/25/2018: day 28 of 7+3 and Day 11 of FLAG JENNIFER. Afebrile, ANC 0. Has completed Flagyl. Will decrease Vanc to 1000 mg q12, trough 19.9, will complete Vanc on 6/27. Liver enzymes stable on Midostaurin. VSS.   6/26/2018: day 29 of 7+3 and day 12 of FLAG JENNIFER. Liver enzymes improved and Midostaurin increased to full dose 50 mg bid. Nausea controlled, afebrile, VSS, NEON.  6/27/2018: day 30 of 7+3 and Day 13 of FLAG JENNIFER. Persistent nausea and emesis x 1 yesterday. Compazine changed to scheduled. Vanc ends today. Afebrile, VSS. Aggressive electrolyte replacement, low electrolytes possibly due to Midostaurin.   6/28/2018: day 31 of 7+3 and Day 14 of FLAG JENNIFER. Nausea improved with scheduled Compazine. Patient has agreed to start converting some IV meds to po. VSS, afebrile, electrolytes wnl today. Only complains of fatigue, new rash noted to upper back and chest and legs, papular rash mildly red.  6/29/2018: Day 32 of 7+3 and Day 15 of FLAG JENNIFER. Day 14 restaging bone marrow biopsy done at bedside today. Patient states nausea improved but she did have emesis last night after taking 9 pm pills. Rash improved. No mouth sores, diarrhea or pain.   7/2/2018: Day 35 of 7+3 and Day 18 of FLAG JENNIFER. Restaging BM bx results pending. Fluid overload over the weekend. Chest x-ray with pulmonary edema. Os sats down to 88%. Placed on O2 at 2 L NC, off O2 this am. Received lasix. Net negative 2.7 L today. 1 episode of nausea, no emesis. Reports anxiety relieved with PRN meds.  Electrolytes improved, afebrile, VSS.   7/3/2018: Day 36 of 7+3 and Day 19 of FLAG JENNIFER. Restaging Bm bx with GABRIEL. Cardiology consulted for abnormal echo, EF 30% with pulmonary HTN and severe L atrial enlargement. EKG with T wave abnormality, possible anterolateral ischemia and prolonged QTc of 530. Medications adjusted. Nausea controlled, no diarrhea. Electrolytes improved. On O2 as needed.   07/04/2018 : Day 37 of 7+3 and Day 20 of FLAG JENNIFER Diarrhea in AM, watery, no associated abdominal pain, nausea, or vomiting.    07/05/2018: Day 38 of 7+3 ane Day 21 of FLAG JENNIFER. No CP or SOB, cardiology following. On RA. All meds changed to po, denies nausea or vomiting and no diarrhea. VSS, afebrile.   7/6/2018: Day 22 of FLAG JENNIFER. Continues to tolerate po meds with no nausea. Afebrile. Awaiting count recovery for discharge.  7/7/2018-/8/2018: Day 40/41 of 7+3, Day 23/24 of FLAG JENNIFER.  Improving clinically.  Started on mag oxide per patient request.  Labs showing signs of ANC recovery.  7/9/2018: Day 25 FLAG JENNIFER, , continues Neupogen. Received platelets today. Afebrile, VSS. Tolerating all oral meds with no nausea or vomiting. Only complains of fatigue.   7/10/2018: Day 26 FLAG JENNIFER,  today. Had 2 episodes of vomiting and diarrhea last night. Feeling better. Afebrile, VSS.   7/11/2018: Day 27 FLAG JENNIFER,  today. No vomiting or diarrhea overnight and no nausea. Afebrile, VSS. Complains of back pain (bone pain) suspect due to count recovery. Relieved with oxy IR and Zyrtec started.  07/12/2018: Day 28 flag jennifer, engrafted today with ANC of 730. Back pain improved with zyrtec. Scheduled for IT chemo tomorrow at 1:30 pm and discharge home thereafter. Will likely need plt transfusion prior to IT chemo tomorrow   7/13/2018: Day 29 of FLAG JENNIFER. ANC up to 1300 today. Transfusing platelets today prior to LP for IT chemo. Patient continues to complain of bone pain, mostly to back and legs. No nausea, diarrhea, sob.  Afebrile and VSS.   --hospitalized 7/23-7/25 for C.diff diarrhea, neutropenic fever.  While inpatient she developed pink blisters on her palm that were concerning for relapse.  --palm biopsy on 7/26/18 for suspicion of leukemia cutis was also negative for sign of relapse  --bone marrow biopsy 7/31/18 was negative for signs of relapse   --Admitted on 8/14/18 for HiDAC#1  --Admitted on 9/11/18 for HiDAC#2  --Admitted on 10/16/18 for HiDAC#3  --Post consolidation bone marrow biopsy on 12/27/2018: AML FISH (BM) Interpretation: The result is within normal limits for the AML FISH panel. No evidence of residual disease.  --Bone Marrow Biopsy on 5/9/19 NO MORPHOLOGIC EVIDENCE OF RESIDUAL ACUTE MYELOID LEUKEMIA. RECOMMEND NGS STUDY TO RULE OUT RESIDUAL DISEASE. deletion 20q  --Bone Marrow Biopsy on 9/23/2020: CELLULARITY=40-50%, TRILINEAGE HEMATOPOIETIC ACTIVITY (M/E= 0.6:1). ERYTHROID HYPERPLASIA, DYSERYTHROPOIESIS, AND INCREASED BLAST (11%). SEE COMMENT.  FOCAL GRADE 1 RETICULAR FIBROSIS.  --Decitabine + Venetoclax: C1D1: 10/12/2020, C2D1: 11/9  --Bone marrow biopsy on 2/9/2021: VARIABLY CELLULAR MARROW WITH DYSERYTHROPOIESIS, DYSMEGAKARYOPOIESIS AND  MARKED MYELOID HYPOPLASIA.NO MORPHOLOGIC EVIDENCE OF RESIDUAL ACUTE MYELOID LEUKEMIA.    --Bone marrow biopsy on 12/13/23: NO DEFINITIVE MORPHOLOGIC OR IMMUNOPHENOTYPIC EVIDENCE OF RESIDUAL/RELAPSED ACUTE MYELOID LEUKEMIA. 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 Fifteen metaphases with monosomy 7 also had a 20q   deletion. This result indicates persistence or recurrence of this patient's previously described abnormal clone.     Interval History:   Mrs. Mac is a 73 y.o. patient who presents with her daughter to discuss her bone marrow biopsy results.    Generally feels like her health is failing.  Weak. Spends most of her time in bed or on the recliner.    Willing to try therapy to keep disease at bay.      Past Medical History:   Past Medical History:    Diagnosis Date    Cancer 03/2018    AML    CHF (congestive heart failure)     Diarrhea 9/14/2018    Encounter for blood transfusion        Current Medications:   Current Outpatient Medications   Medication Sig    EScitalopram oxalate (LEXAPRO) 20 MG tablet TAKE ONE TABLET BY MOUTH EACH EVENING    methocarbamoL (ROBAXIN) 500 MG Tab Take 1 tablet (500 mg total) by mouth 4 (four) times daily.    metoprolol succinate (TOPROL-XL) 25 MG 24 hr tablet Take 1 tablet (25 mg total) by mouth once daily.    omeprazole (PRILOSEC) 20 MG capsule Take 1 capsule (20 mg total) by mouth 2 (two) times daily before meals.    ondansetron (ZOFRAN) 8 MG tablet Take 1 tablet (8 mg total) by mouth every 12 (twelve) hours as needed for Nausea.    ondansetron (ZOFRAN-ODT) 8 MG TbDL Take 8 mg by mouth 3 (three) times daily.    oxyCODONE (ROXICODONE) 10 mg Tab immediate release tablet Take 1 tablet (10 mg total) by mouth every 6 (six) hours as needed (pain).    oxyCODONE (ROXICODONE) 10 mg Tab immediate release tablet Take 1 tablet (10 mg total) by mouth every 4 (four) hours as needed for Pain.    oxyCODONE (ROXICODONE) 20 mg Tab immediate release tablet Take 1 tablet (20 mg total) by mouth every 6 (six) hours as needed (pain).    predniSONE (DELTASONE) 20 MG tablet TAKE ONE TABLET BY MOUTH ONCE DAILY AS NEEDED    tiZANidine (ZANAFLEX) 4 MG tablet Take 1 tablet (4 mg total) by mouth 3 (three) times daily.    acyclovir 5% (ZOVIRAX) 5 % ointment Apply topically every 3 (three) hours.    ALPRAZolam (XANAX) 2 MG Tab TAKE ONE TABLET BY MOUTH NIGHTLY AS NEEDED    nitrofurantoin, macrocrystal-monohydrate, (MACROBID) 100 MG capsule Take 100 mg by mouth 2 (two) times daily.     No current facility-administered medications for this visit.     ALLERGIES:   Review of patient's allergies indicates:   Allergen Reactions    Methotrexate analogues      Elevated Liver Enzyme    Bactrim [sulfamethoxazole-trimethoprim] Nausea And Vomiting and  Rash       Review of Systems:     Review of Systems   Constitutional: Positive for fatigue. Negative for appetite change, chills, diaphoresis, fever and unexpected weight change.   HENT:   Negative for hearing loss, mouth sores, nosebleeds, sore throat, trouble swallowing and voice change.    Eyes: Negative for eye problems and icterus.   Respiratory: Negative for chest tightness, cough, hemoptysis, shortness of breath and wheezing.    Cardiovascular: Negative for chest pain, leg swelling and palpitations.   Gastrointestinal: Negative for abdominal distention, abdominal pain, blood in stool, diarrhea, nausea and vomiting.   Endocrine: Negative for hot flashes.   Genitourinary: Negative for bladder incontinence, difficulty urinating, dysuria and hematuria.    Musculoskeletal: Positive for arthralgias and myalgias. Negative for back pain, flank pain, gait problem, neck pain and neck stiffness.   Skin: Negative for itching, rash and wound.   Neurological: Negative for dizziness, extremity weakness, gait problem, headaches, numbness, seizures and speech difficulty.   Hematological: Negative for adenopathy. Does not  bleed easily.   Psychiatric/Behavioral: Negative for confusion, depression and sleep disturbance. The patient is not nervous/anxious.        Physical Exam:     Vitals:    12/20/23 1300   BP: (!) 121/58   Pulse: 81   Resp: 20   Temp: 97.9 °F (36.6 °C)         Constitutional:       Appearance: She is well-developed.   HENT:      Head: Normocephalic and atraumatic.      Right Ear: External ear normal.      Left Ear: External ear normal.      Mouth/Throat:      Pharynx: No posterior oropharyngeal erythema.   Eyes:      Conjunctiva/sclera: Conjunctivae normal.   Cardiovascular:      Rate and Rhythm: Normal rate and regular rhythm.      Heart sounds: Normal heart sounds. No murmur heard.     Pulmonary:      Effort: Pulmonary effort is normal. No respiratory distress.      Breath sounds: Normal breath sounds. No  stridor. No wheezing or rales.   Abdominal:      General: Bowel sounds are normal. There is no distension.      Palpations: Abdomen is soft.      Tenderness: There is no abdominal tenderness.   Musculoskeletal:         General: Normal range of motion.      Cervical back: Normal range of motion.      Right lower leg: No edema.      Left lower leg: No edema.   Skin:     General: Skin is warm and dry.      Findings: No rash.   Neurological:      Mental Status: She is alert and oriented to person, place, and time.   Psychiatric:         Behavior: Behavior normal.         Thought Content: Thought content normal.         Judgment: Judgment normal.       ECOG Performance Status: (foot note - ECOG PS provided by Eastern Cooperative Oncology Group) 2 - Symptomatic, <50% confined to bed    Karnofsky Performance Score:  80%- Normal Activity with Effort: Some Symptoms of Disease    Labs:   Lab Results   Component Value Date    WBC 1.98 (LL) 12/20/2023    HGB 9.2 (L) 12/20/2023    HCT 31.6 (L) 12/20/2023    PLT 58 (L) 12/20/2023    ALT 57 (H) 12/20/2023    AST 25 12/20/2023     12/20/2023    K 4.1 12/20/2023     12/20/2023    CREATININE 1.0 12/20/2023    BUN 17 12/20/2023    CO2 23 12/20/2023    TSH 1.913 10/17/2019    INR 0.9 01/31/2021    HGBA1C 6.0 (H) 01/31/2021       Imaging: previous imaging has been reviewed    Assessment and Plan:     Mrs. Mac is a 73 year old female with AML in second remission.     AML  -- Admitted from [facility] on [facility]18 early AM with WBC around 100s, for suspected new non-M3 AML  --lo res HLA typing on 5/26/18 and hi res on 5/27/18 done in anticipation of possible need for future transplant   --Pt with two daughters, two full sisters (in their mid 60s, one with brain aneurysm hx, other one without any medical issues), and one full brother (59 y/o healthy).  --NPM1 +, CEBPA (-), FLT3 (+).  On Midostaurin 50 mg PO BID until Day 14 (held starting 6/8 to 6/11). Stopped when FLAG  JENNIFER re-induction started. Restarted Midostaurin at 25 mg bid on day 8 of FLAG JENNIFER induction (6/22), increase to full dose 50 mg bid (6/26) as improvement in liver enzymes. Stopped 7/3/18 due to abnormal cardiac echo.  --day 14 bone marrow biopsy completed 6/11 showing persistent disease with 15% CD 34 positive blasts  --Day 60 of FLAG-JENNIFER, tolerating without difficulty except nausea/vomiting  --day 14 restaging bone marrow biopsy done 6/29 without complication, results with no evidence of AML, FLT 3 negative, will need repeat marrow at count recovery outpatient   --recovery marrow showed no evidence of disease  --HiDAC #1 on 8/14/18, HiDAC#2 on 9/11/18  --Cycle 3 delayed 1 week due to pancytopenia. Started on 10/16/18  --recovery bone marrow showed no signs of residual leukemia  --Bone marrow biopsy done last week reveals early relapse. 11% blasts. We discussed the possibility of re-induction, but she is adamantly against. She does not want to be admitted to the hospital.  --Plan for single agent Gilteritinib 120mg has changed based on next gen sequencing that reports FLT-3 negative at this time  --Completed 4 cycles of Decitabine + Venetoclax  --bone marrow biopsy shows a recurrence of monosomy 7 and 20q deletion, which was seen at the time of her initial diagnosis. No overt signs of increased blasts.  --Plan to treat with Aza/Venetoclax in early January, hopefully on the [facility]      Abnormal Liver Function Tests  --ALT and AST and Alk phos have again increased substantially since re-starting midostaurin.   --midostaurin started day 8 at 25 mg bid, monitoring liver enzymes closely and improved. Increasing Midostaurin to 50 mg bid 6/26. Stopped Midostaurin (7/3) due to new diagnosis of systolic heart failure EF 35%.    --permanently discontinued midostaurin   --LFTs fluctuate with tylenol use    Chemotherapy induced cardiomyopathy  --cardiology follow-up as outpatient in 3 months  --repeat 2D echo on 6/15 with  preserved EF of 60%.  However 7/2/18 echo with reduced EF 30-35%  --Echo on 8/15/18 was within normal range EF of 60-65%    Rheumatologic issues  --Previously on steroids and methotrexate  --Now on single agent low dose steroids [she self adjusts this medication]  --previously worked with [facility]      30 minutes were spent face to face with the patient and her family to discuss the disease, natural history, treatment options and survival statistics. I have provided the patient with an opportunity to ask questions and have all questions answered to her satisfaction.      she will return to the clinic at the beginning of January, but knows to call in the interim if symptoms change or should a problem arise.        Laquita Troy MD  Hematology and Medical Oncology  Bone Marrow Transplant  [facility]             BMT Chart Routing  Urgent    Follow up with physician . 1. insuarance auth and schedule sub q azacitadine eith 1/8 or 1/15 2. see me with labs several days prior to therapy with a cbc,cmp   Follow up with [facility]    Provider visit type    Infusion scheduling note    Injection scheduling note    Labs    Imaging    Pharmacy appointment    Other referrals

## 2023-12-27 ENCOUNTER — TELEPHONE (OUTPATIENT)
Dept: HEMATOLOGY/ONCOLOGY | Facility: CLINIC | Age: 73
End: 2023-12-27
Payer: MEDICARE

## 2023-12-28 ENCOUNTER — TELEPHONE (OUTPATIENT)
Dept: HEMATOLOGY/ONCOLOGY | Facility: CLINIC | Age: 73
End: 2023-12-28
Payer: MEDICARE

## 2024-01-03 ENCOUNTER — TELEPHONE (OUTPATIENT)
Dept: HEMATOLOGY/ONCOLOGY | Facility: CLINIC | Age: 74
End: 2024-01-03
Payer: MEDICARE

## 2024-01-04 ENCOUNTER — DOCUMENTATION ONLY (OUTPATIENT)
Dept: HEMATOLOGY/ONCOLOGY | Facility: CLINIC | Age: 74
End: 2024-01-04
Payer: MEDICARE

## 2024-01-04 ENCOUNTER — LAB VISIT (OUTPATIENT)
Dept: LAB | Facility: HOSPITAL | Age: 74
End: 2024-01-04
Attending: INTERNAL MEDICINE
Payer: MEDICARE

## 2024-01-04 ENCOUNTER — OFFICE VISIT (OUTPATIENT)
Dept: HEMATOLOGY/ONCOLOGY | Facility: CLINIC | Age: 74
End: 2024-01-04
Payer: MEDICARE

## 2024-01-04 VITALS
HEIGHT: 64 IN | BODY MASS INDEX: 35 KG/M2 | TEMPERATURE: 98 F | RESPIRATION RATE: 20 BRPM | DIASTOLIC BLOOD PRESSURE: 58 MMHG | HEART RATE: 72 BPM | WEIGHT: 205 LBS | SYSTOLIC BLOOD PRESSURE: 123 MMHG | OXYGEN SATURATION: 97 %

## 2024-01-04 DIAGNOSIS — N10 ACUTE PYELONEPHRITIS: ICD-10-CM

## 2024-01-04 DIAGNOSIS — D84.821 IMMUNODEFICIENCY DUE TO DRUG THERAPY: ICD-10-CM

## 2024-01-04 DIAGNOSIS — C92.02 ACUTE MYELOID LEUKEMIA IN RELAPSE: Primary | ICD-10-CM

## 2024-01-04 DIAGNOSIS — M35.1 MCTD (MIXED CONNECTIVE TISSUE DISEASE): ICD-10-CM

## 2024-01-04 DIAGNOSIS — Z79.899 IMMUNODEFICIENCY DUE TO DRUG THERAPY: ICD-10-CM

## 2024-01-04 DIAGNOSIS — C92.02 ACUTE MYELOID LEUKEMIA IN RELAPSE: ICD-10-CM

## 2024-01-04 DIAGNOSIS — C92.02 AML (ACUTE MYELOID LEUKEMIA) IN RELAPSE: ICD-10-CM

## 2024-01-04 LAB
ALBUMIN SERPL BCP-MCNC: 3.1 G/DL (ref 3.5–5.2)
ALP SERPL-CCNC: 81 U/L (ref 55–135)
ALT SERPL W/O P-5'-P-CCNC: 47 U/L (ref 10–44)
ANION GAP SERPL CALC-SCNC: 9 MMOL/L (ref 8–16)
AST SERPL-CCNC: 32 U/L (ref 10–40)
BASOPHILS # BLD AUTO: 0 K/UL (ref 0–0.2)
BASOPHILS NFR BLD: 0 % (ref 0–1.9)
BILIRUB SERPL-MCNC: 0.8 MG/DL (ref 0.1–1)
BUN SERPL-MCNC: 16 MG/DL (ref 8–23)
CALCIUM SERPL-MCNC: 8.9 MG/DL (ref 8.7–10.5)
CHLORIDE SERPL-SCNC: 105 MMOL/L (ref 95–110)
CO2 SERPL-SCNC: 27 MMOL/L (ref 23–29)
CREAT SERPL-MCNC: 1 MG/DL (ref 0.5–1.4)
DIFFERENTIAL METHOD BLD: ABNORMAL
EOSINOPHIL # BLD AUTO: 0 K/UL (ref 0–0.5)
EOSINOPHIL NFR BLD: 0 % (ref 0–8)
ERYTHROCYTE [DISTWIDTH] IN BLOOD BY AUTOMATED COUNT: 18.8 % (ref 11.5–14.5)
EST. GFR  (NO RACE VARIABLE): 59.9 ML/MIN/1.73 M^2
GLUCOSE SERPL-MCNC: 78 MG/DL (ref 70–110)
HCT VFR BLD AUTO: 32.6 % (ref 37–48.5)
HGB BLD-MCNC: 9.3 G/DL (ref 12–16)
IMM GRANULOCYTES # BLD AUTO: 0.11 K/UL (ref 0–0.04)
IMM GRANULOCYTES NFR BLD AUTO: 4.3 % (ref 0–0.5)
LYMPHOCYTES # BLD AUTO: 0.9 K/UL (ref 1–4.8)
LYMPHOCYTES NFR BLD: 33.6 % (ref 18–48)
MAYO MISCELLANEOUS RESULT (REF): NORMAL
MCH RBC QN AUTO: 27.9 PG (ref 27–31)
MCHC RBC AUTO-ENTMCNC: 28.5 G/DL (ref 32–36)
MCV RBC AUTO: 98 FL (ref 82–98)
MONOCYTES # BLD AUTO: 0.5 K/UL (ref 0.3–1)
MONOCYTES NFR BLD: 20.7 % (ref 4–15)
NEUTROPHILS # BLD AUTO: 1.1 K/UL (ref 1.8–7.7)
NEUTROPHILS NFR BLD: 41.4 % (ref 38–73)
NRBC BLD-RTO: 4 /100 WBC
PLATELET # BLD AUTO: 78 K/UL (ref 150–450)
PMV BLD AUTO: 11.8 FL (ref 9.2–12.9)
POTASSIUM SERPL-SCNC: 3.9 MMOL/L (ref 3.5–5.1)
PROT SERPL-MCNC: 5.7 G/DL (ref 6–8.4)
RBC # BLD AUTO: 3.33 M/UL (ref 4–5.4)
SODIUM SERPL-SCNC: 141 MMOL/L (ref 136–145)
WBC # BLD AUTO: 2.56 K/UL (ref 3.9–12.7)

## 2024-01-04 PROCEDURE — 99215 OFFICE O/P EST HI 40 MIN: CPT | Mod: S$PBB,,, | Performed by: INTERNAL MEDICINE

## 2024-01-04 PROCEDURE — 99213 OFFICE O/P EST LOW 20 MIN: CPT | Mod: PBBFAC | Performed by: INTERNAL MEDICINE

## 2024-01-04 PROCEDURE — 85025 COMPLETE CBC W/AUTO DIFF WBC: CPT | Performed by: INTERNAL MEDICINE

## 2024-01-04 PROCEDURE — 80053 COMPREHEN METABOLIC PANEL: CPT | Performed by: INTERNAL MEDICINE

## 2024-01-04 PROCEDURE — 99999 PR PBB SHADOW E&M-EST. PATIENT-LVL III: CPT | Mod: PBBFAC,,, | Performed by: INTERNAL MEDICINE

## 2024-01-04 PROCEDURE — 36415 COLL VENOUS BLD VENIPUNCTURE: CPT | Performed by: INTERNAL MEDICINE

## 2024-01-04 RX ORDER — AZACITIDINE 100 MG/1
75 INJECTION, POWDER, LYOPHILIZED, FOR SOLUTION INTRAVENOUS; SUBCUTANEOUS
Status: CANCELLED | OUTPATIENT
Start: 2024-01-17

## 2024-01-04 RX ORDER — ONDANSETRON HYDROCHLORIDE 8 MG/1
16 TABLET, FILM COATED ORAL
Status: CANCELLED | OUTPATIENT
Start: 2024-01-08

## 2024-01-04 RX ORDER — AZACITIDINE 100 MG/1
75 INJECTION, POWDER, LYOPHILIZED, FOR SOLUTION INTRAVENOUS; SUBCUTANEOUS
Status: CANCELLED | OUTPATIENT
Start: 2024-01-08

## 2024-01-04 RX ORDER — ONDANSETRON HYDROCHLORIDE 8 MG/1
16 TABLET, FILM COATED ORAL
Status: CANCELLED | OUTPATIENT
Start: 2024-01-19

## 2024-01-04 RX ORDER — AZACITIDINE 100 MG/1
75 INJECTION, POWDER, LYOPHILIZED, FOR SOLUTION INTRAVENOUS; SUBCUTANEOUS
Status: CANCELLED | OUTPATIENT
Start: 2024-01-19

## 2024-01-04 RX ORDER — ONDANSETRON HYDROCHLORIDE 8 MG/1
16 TABLET, FILM COATED ORAL
Status: CANCELLED | OUTPATIENT
Start: 2024-01-18

## 2024-01-04 RX ORDER — AZACITIDINE 100 MG/1
75 INJECTION, POWDER, LYOPHILIZED, FOR SOLUTION INTRAVENOUS; SUBCUTANEOUS
Status: CANCELLED | OUTPATIENT
Start: 2024-01-20

## 2024-01-04 RX ORDER — AZACITIDINE 100 MG/1
75 INJECTION, POWDER, LYOPHILIZED, FOR SOLUTION INTRAVENOUS; SUBCUTANEOUS
Status: CANCELLED | OUTPATIENT
Start: 2024-01-18

## 2024-01-04 RX ORDER — ONDANSETRON HYDROCHLORIDE 8 MG/1
16 TABLET, FILM COATED ORAL
Status: CANCELLED | OUTPATIENT
Start: 2024-01-17

## 2024-01-04 RX ORDER — ONDANSETRON HYDROCHLORIDE 8 MG/1
16 TABLET, FILM COATED ORAL
Status: CANCELLED | OUTPATIENT
Start: 2024-01-20

## 2024-01-05 RX ORDER — NITROFURANTOIN 25; 75 MG/1; MG/1
100 CAPSULE ORAL 2 TIMES DAILY
Qty: 60 CAPSULE | Refills: 3 | Status: SHIPPED | OUTPATIENT
Start: 2024-01-05

## 2024-01-08 PROBLEM — Z79.899 IMMUNODEFICIENCY DUE TO DRUG THERAPY: Status: ACTIVE | Noted: 2024-01-08

## 2024-01-08 PROBLEM — D84.821 IMMUNODEFICIENCY DUE TO DRUG THERAPY: Status: ACTIVE | Noted: 2024-01-08

## 2024-01-12 LAB — MAYO MISCELLANEOUS RESULT (REF): NORMAL

## 2024-01-16 ENCOUNTER — DOCUMENTATION ONLY (OUTPATIENT)
Dept: INFUSION THERAPY | Facility: HOSPITAL | Age: 74
End: 2024-01-16
Payer: MEDICARE

## 2024-01-16 ENCOUNTER — INFUSION (OUTPATIENT)
Dept: INFUSION THERAPY | Facility: HOSPITAL | Age: 74
End: 2024-01-16
Attending: INTERNAL MEDICINE
Payer: MEDICARE

## 2024-01-16 VITALS
HEIGHT: 64 IN | HEART RATE: 86 BPM | SYSTOLIC BLOOD PRESSURE: 136 MMHG | BODY MASS INDEX: 35.46 KG/M2 | DIASTOLIC BLOOD PRESSURE: 58 MMHG | RESPIRATION RATE: 16 BRPM | WEIGHT: 207.69 LBS | TEMPERATURE: 98 F

## 2024-01-16 DIAGNOSIS — C92.02 ACUTE MYELOID LEUKEMIA IN RELAPSE: ICD-10-CM

## 2024-01-16 DIAGNOSIS — C93.01 ACUTE MONOCYTIC LEUKEMIA IN REMISSION: Primary | ICD-10-CM

## 2024-01-16 PROCEDURE — 63600175 PHARM REV CODE 636 W HCPCS: Mod: PN | Performed by: INTERNAL MEDICINE

## 2024-01-16 PROCEDURE — 96401 CHEMO ANTI-NEOPL SQ/IM: CPT | Mod: PN

## 2024-01-16 RX ORDER — ONDANSETRON 8 MG/1
16 TABLET, ORALLY DISINTEGRATING ORAL
Status: DISCONTINUED | OUTPATIENT
Start: 2024-01-16 | End: 2024-01-16 | Stop reason: HOSPADM

## 2024-01-16 RX ORDER — AZACITIDINE 100 MG/1
75 INJECTION, POWDER, LYOPHILIZED, FOR SOLUTION INTRAVENOUS; SUBCUTANEOUS
Status: COMPLETED | OUTPATIENT
Start: 2024-01-16 | End: 2024-01-16

## 2024-01-16 RX ADMIN — AZACITIDINE 155 MG: 100 INJECTION, POWDER, LYOPHILIZED, FOR SOLUTION INTRAVENOUS; SUBCUTANEOUS at 03:01

## 2024-01-16 NOTE — PLAN OF CARE
Pt tolerated 1st Vidaza injections well.  Reviewed possible side effects and management of side effects.  Pt started her oral chemotherapy today at home.  Pt has Zofran PRN also.  Instructed to call MD with any problems

## 2024-01-16 NOTE — PROGRESS NOTES
Oncology Nutrition   New Patient Education  Sabrina Mac   1950    Nutrition Education   This is a 73 y.o.female with a medical diagnosis of AML.     Met w/ pt and her , Jesi, at chairside to discuss current nutritional status and nutrition as it relates to cancer and cancer treatment. Pt currently low nutrition risk.     Wt Readings from Last 10 Encounters:   01/16/24 94.2 kg (207 lb 10.8 oz)   01/04/24 93 kg (205 lb)   12/20/23 93 kg (205 lb 0.4 oz)   12/13/23 90.7 kg (200 lb)   11/27/23 94.3 kg (207 lb 12.5 oz)   08/17/23 88.4 kg (194 lb 14.2 oz)   07/05/23 90.7 kg (200 lb)   05/25/23 93.7 kg (206 lb 9.1 oz)   04/17/23 94.8 kg (208 lb 15.9 oz)   02/06/23 95 kg (209 lb 7 oz)      [x] PMHx reviewed  [x] Labs reviewed    Pt has been on/off cancer treatment for 6 year so knows a good bit so RD briefly reviewed recommendations on food safety and good nutrition/hydration. Pt appreciative.     Patient provided with dietitian contact number and advised to call with questions or make future appointment if further intervention is needed. RD to follow throughout tx PRN.    Rocío Ling, MS, RD, LDN  01/16/2024  3:40 PM

## 2024-01-17 ENCOUNTER — PATIENT MESSAGE (OUTPATIENT)
Dept: HEMATOLOGY/ONCOLOGY | Facility: CLINIC | Age: 74
End: 2024-01-17
Payer: MEDICARE

## 2024-01-17 ENCOUNTER — INFUSION (OUTPATIENT)
Dept: INFUSION THERAPY | Facility: HOSPITAL | Age: 74
End: 2024-01-17
Attending: INTERNAL MEDICINE
Payer: MEDICARE

## 2024-01-17 VITALS
BODY MASS INDEX: 35.46 KG/M2 | TEMPERATURE: 98 F | RESPIRATION RATE: 16 BRPM | HEART RATE: 91 BPM | WEIGHT: 207.69 LBS | SYSTOLIC BLOOD PRESSURE: 136 MMHG | DIASTOLIC BLOOD PRESSURE: 58 MMHG | HEIGHT: 64 IN

## 2024-01-17 DIAGNOSIS — C93.01 ACUTE MONOCYTIC LEUKEMIA IN REMISSION: Primary | ICD-10-CM

## 2024-01-17 DIAGNOSIS — C92.02 ACUTE MYELOID LEUKEMIA IN RELAPSE: ICD-10-CM

## 2024-01-17 PROCEDURE — 63600175 PHARM REV CODE 636 W HCPCS: Mod: PN | Performed by: INTERNAL MEDICINE

## 2024-01-17 PROCEDURE — 96401 CHEMO ANTI-NEOPL SQ/IM: CPT | Mod: PN

## 2024-01-17 PROCEDURE — 25000003 PHARM REV CODE 250: Mod: PN | Performed by: INTERNAL MEDICINE

## 2024-01-17 RX ORDER — AZACITIDINE 100 MG/1
75 INJECTION, POWDER, LYOPHILIZED, FOR SOLUTION INTRAVENOUS; SUBCUTANEOUS
Status: COMPLETED | OUTPATIENT
Start: 2024-01-17 | End: 2024-01-17

## 2024-01-17 RX ORDER — ONDANSETRON 8 MG/1
16 TABLET, ORALLY DISINTEGRATING ORAL
Status: COMPLETED | OUTPATIENT
Start: 2024-01-17 | End: 2024-01-17

## 2024-01-17 RX ADMIN — ONDANSETRON 16 MG: 8 TABLET, ORALLY DISINTEGRATING ORAL at 02:01

## 2024-01-17 RX ADMIN — AZACITIDINE 155 MG: 100 INJECTION, POWDER, LYOPHILIZED, FOR SOLUTION INTRAVENOUS; SUBCUTANEOUS at 03:01

## 2024-01-17 NOTE — PLAN OF CARE
1/7/2020 8:28 AM 
 
Ms. Esther Tai [facility] [phone] Re: Esther Tai To Whom It May Concern: 
 
Esther Tai was under my care for a medical issue that started July 5, 2019. She was last seen by me on July 11, 2019, however, she was still experiencing symptoms until July 15, 2019. She was advised not to engage in physical activity during this time due to her condition, including but not limited to going to the gym. This was an acute condition she experienced and has been resolved. If there are questions or concerns, please have the patient contact our office. Thank you for your assistance in this matter. Sincerely, Funmilayo Oropeza NP 
 
 Pt tolerated Vidaza injections well.  No s/s of reaction noted.  Instructed to call MD with any problems

## 2024-01-18 ENCOUNTER — INFUSION (OUTPATIENT)
Dept: INFUSION THERAPY | Facility: HOSPITAL | Age: 74
End: 2024-01-18
Attending: INTERNAL MEDICINE
Payer: MEDICARE

## 2024-01-18 VITALS
SYSTOLIC BLOOD PRESSURE: 115 MMHG | HEART RATE: 91 BPM | TEMPERATURE: 99 F | HEIGHT: 64 IN | DIASTOLIC BLOOD PRESSURE: 67 MMHG | BODY MASS INDEX: 35.46 KG/M2 | RESPIRATION RATE: 18 BRPM | WEIGHT: 207.69 LBS

## 2024-01-18 DIAGNOSIS — C92.02 ACUTE MYELOID LEUKEMIA IN RELAPSE: ICD-10-CM

## 2024-01-18 DIAGNOSIS — C93.01 ACUTE MONOCYTIC LEUKEMIA IN REMISSION: Primary | ICD-10-CM

## 2024-01-18 PROCEDURE — 25000003 PHARM REV CODE 250: Mod: PN | Performed by: INTERNAL MEDICINE

## 2024-01-18 PROCEDURE — 63600175 PHARM REV CODE 636 W HCPCS: Mod: PN | Performed by: INTERNAL MEDICINE

## 2024-01-18 PROCEDURE — 96401 CHEMO ANTI-NEOPL SQ/IM: CPT | Mod: PN

## 2024-01-18 RX ORDER — AZACITIDINE 100 MG/1
75 INJECTION, POWDER, LYOPHILIZED, FOR SOLUTION INTRAVENOUS; SUBCUTANEOUS
Status: COMPLETED | OUTPATIENT
Start: 2024-01-18 | End: 2024-01-18

## 2024-01-18 RX ORDER — ONDANSETRON 8 MG/1
16 TABLET, ORALLY DISINTEGRATING ORAL
Status: COMPLETED | OUTPATIENT
Start: 2024-01-18 | End: 2024-01-18

## 2024-01-18 RX ADMIN — ONDANSETRON 16 MG: 8 TABLET, ORALLY DISINTEGRATING ORAL at 03:01

## 2024-01-18 RX ADMIN — AZACITIDINE 155 MG: 100 INJECTION, POWDER, LYOPHILIZED, FOR SOLUTION INTRAVENOUS; SUBCUTANEOUS at 03:01

## 2024-01-18 NOTE — PLAN OF CARE
Problem: Adult Inpatient Plan of Care  Goal: Plan of Care Review  Outcome: Ongoing, Progressing  Flowsheets (Taken 1/18/2024 1511)  Plan of Care Reviewed With:   patient   family  Goal: Patient-Specific Goal (Individualized)  Outcome: Ongoing, Progressing  Flowsheets (Taken 1/18/2024 1511)  Anxieties, Fears or Concerns: pain at inj site  Individualized Care Needs: recliner, pillow, family chairside     Problem: Fatigue  Goal: Improved Activity Tolerance  Outcome: Ongoing, Progressing  Intervention: Promote Improved Energy  Flowsheets (Taken 1/18/2024 1511)  Fatigue Management: paced activity encouraged  Sleep/Rest Enhancement:   noise level reduced   family presence promoted   relaxation techniques promoted  Activity Management: Up in chair - L3    Pt tolerated vidaza to abd. NAD noted. Pt d/c home

## 2024-01-19 ENCOUNTER — INFUSION (OUTPATIENT)
Dept: INFUSION THERAPY | Facility: HOSPITAL | Age: 74
End: 2024-01-19
Attending: INTERNAL MEDICINE
Payer: MEDICARE

## 2024-01-19 VITALS
SYSTOLIC BLOOD PRESSURE: 121 MMHG | WEIGHT: 206.56 LBS | HEART RATE: 88 BPM | TEMPERATURE: 99 F | BODY MASS INDEX: 35.26 KG/M2 | RESPIRATION RATE: 18 BRPM | DIASTOLIC BLOOD PRESSURE: 62 MMHG | OXYGEN SATURATION: 95 % | HEIGHT: 64 IN

## 2024-01-19 DIAGNOSIS — C93.01 ACUTE MONOCYTIC LEUKEMIA IN REMISSION: Primary | ICD-10-CM

## 2024-01-19 DIAGNOSIS — C92.02 ACUTE MYELOID LEUKEMIA IN RELAPSE: ICD-10-CM

## 2024-01-19 PROCEDURE — 63600175 PHARM REV CODE 636 W HCPCS: Mod: PN | Performed by: INTERNAL MEDICINE

## 2024-01-19 PROCEDURE — 25000003 PHARM REV CODE 250: Mod: PN | Performed by: INTERNAL MEDICINE

## 2024-01-19 PROCEDURE — 96401 CHEMO ANTI-NEOPL SQ/IM: CPT | Mod: PN

## 2024-01-19 RX ORDER — ONDANSETRON 8 MG/1
16 TABLET, ORALLY DISINTEGRATING ORAL
Status: COMPLETED | OUTPATIENT
Start: 2024-01-19 | End: 2024-01-19

## 2024-01-19 RX ORDER — AZACITIDINE 100 MG/1
75 INJECTION, POWDER, LYOPHILIZED, FOR SOLUTION INTRAVENOUS; SUBCUTANEOUS
Status: COMPLETED | OUTPATIENT
Start: 2024-01-19 | End: 2024-01-19

## 2024-01-19 RX ADMIN — ONDANSETRON 16 MG: 8 TABLET, ORALLY DISINTEGRATING ORAL at 02:01

## 2024-01-19 RX ADMIN — AZACITIDINE 155 MG: 100 INJECTION, POWDER, LYOPHILIZED, FOR SOLUTION INTRAVENOUS; SUBCUTANEOUS at 02:01

## 2024-01-19 NOTE — PLAN OF CARE
Pt arrived to clinic for C1D4 Vidaza injection and tolerated well with no changes throughout therapy. Pt aware of Venetoclax dose to take for today and other home meds to prevent infection as well. Pt aware of side effects and f/u appts and discharged to home in NAD with  in wheelchair.

## 2024-01-19 NOTE — PLAN OF CARE
Problem: Adult Inpatient Plan of Care  Goal: Plan of Care Review  Outcome: Ongoing, Progressing  Flowsheets (Taken 1/19/2024 1452)  Plan of Care Reviewed With:   patient   spouse  Goal: Patient-Specific Goal (Individualized)  Outcome: Ongoing, Progressing  Flowsheets (Taken 1/19/2024 1452)  Anxieties, Fears or Concerns: pain at injection site  Individualized Care Needs: recliner, education, conversation, wheelchair,  at chairside  Goal: Optimal Comfort and Wellbeing  Outcome: Ongoing, Progressing  Intervention: Provide Person-Centered Care  Flowsheets (Taken 1/19/2024 1452)  Trust Relationship/Rapport:   care explained   questions encouraged   choices provided   reassurance provided   emotional support provided   thoughts/feelings acknowledged   empathic listening provided   questions answered     Problem: Fatigue  Goal: Improved Activity Tolerance  Outcome: Ongoing, Progressing  Intervention: Promote Improved Energy  Flowsheets (Taken 1/19/2024 1452)  Fatigue Management:   paced activity encouraged   fatigue-related activity identified   frequent rest breaks encouraged   activity assistance provided  Sleep/Rest Enhancement:   therapeutic touch utilized   relaxation techniques promoted   noise level reduced   natural light exposure provided   consistent schedule promoted   family presence promoted  Activity Management: Up in chair - L3     Problem: Fall Injury Risk  Goal: Absence of Fall and Fall-Related Injury  Outcome: Ongoing, Progressing  Intervention: Promote Injury-Free Environment  Flowsheets (Taken 1/19/2024 1452)  Safety Promotion/Fall Prevention:   supervised activity   instructed to call staff for mobility   room near unit station   high risk medications identified   Fall Risk reviewed with patient/family   in recliner, wheels locked   lighting adjusted   medications reviewed   family to remain at bedside   assistive device/personal item within reach

## 2024-01-22 ENCOUNTER — INFUSION (OUTPATIENT)
Dept: INFUSION THERAPY | Facility: HOSPITAL | Age: 74
End: 2024-01-22
Attending: INTERNAL MEDICINE
Payer: MEDICARE

## 2024-01-22 VITALS
OXYGEN SATURATION: 95 % | RESPIRATION RATE: 16 BRPM | WEIGHT: 206.56 LBS | TEMPERATURE: 98 F | BODY MASS INDEX: 35.26 KG/M2 | HEIGHT: 64 IN | DIASTOLIC BLOOD PRESSURE: 74 MMHG | SYSTOLIC BLOOD PRESSURE: 149 MMHG | HEART RATE: 92 BPM

## 2024-01-22 DIAGNOSIS — C92.02 ACUTE MYELOID LEUKEMIA IN RELAPSE: ICD-10-CM

## 2024-01-22 DIAGNOSIS — C93.01 ACUTE MONOCYTIC LEUKEMIA IN REMISSION: Primary | ICD-10-CM

## 2024-01-22 PROCEDURE — 96360 HYDRATION IV INFUSION INIT: CPT

## 2024-01-22 PROCEDURE — 63600175 PHARM REV CODE 636 W HCPCS: Mod: PN | Performed by: INTERNAL MEDICINE

## 2024-01-22 PROCEDURE — 25000003 PHARM REV CODE 250: Mod: PN | Performed by: INTERNAL MEDICINE

## 2024-01-22 PROCEDURE — 96401 CHEMO ANTI-NEOPL SQ/IM: CPT | Mod: PN

## 2024-01-22 PROCEDURE — 96361 HYDRATE IV INFUSION ADD-ON: CPT | Mod: PN

## 2024-01-22 RX ORDER — SODIUM CHLORIDE 9 MG/ML
1000 INJECTION, SOLUTION INTRAVENOUS ONCE
Status: COMPLETED | OUTPATIENT
Start: 2024-01-22 | End: 2024-01-22

## 2024-01-22 RX ORDER — AZACITIDINE 100 MG/1
75 INJECTION, POWDER, LYOPHILIZED, FOR SOLUTION INTRAVENOUS; SUBCUTANEOUS
Status: COMPLETED | OUTPATIENT
Start: 2024-01-22 | End: 2024-01-22

## 2024-01-22 RX ORDER — ONDANSETRON 8 MG/1
16 TABLET, ORALLY DISINTEGRATING ORAL
Status: COMPLETED | OUTPATIENT
Start: 2024-01-22 | End: 2024-01-22

## 2024-01-22 RX ADMIN — SODIUM CHLORIDE 1000 ML: 9 INJECTION, SOLUTION INTRAVENOUS at 02:01

## 2024-01-22 RX ADMIN — ONDANSETRON 16 MG: 8 TABLET, ORALLY DISINTEGRATING ORAL at 02:01

## 2024-01-22 RX ADMIN — AZACITIDINE 155 MG: 100 INJECTION, POWDER, LYOPHILIZED, FOR SOLUTION INTRAVENOUS; SUBCUTANEOUS at 02:01

## 2024-01-22 NOTE — PLAN OF CARE
Problem: Fatigue  Goal: Improved Activity Tolerance  Outcome: Ongoing, Progressing     Problem: Adult Inpatient Plan of Care  Goal: Plan of Care Review  Outcome: Met   Tolerated infusion well today Able to be d/c to home  Discharge instructions given with copy of avs and pt ambulated to private vehicle without difficulty NAD

## 2024-01-24 DIAGNOSIS — L50.9 URTICARIA: ICD-10-CM

## 2024-01-24 DIAGNOSIS — M19.90 ARTHRITIS: ICD-10-CM

## 2024-01-24 DIAGNOSIS — G89.3 CANCER RELATED PAIN: ICD-10-CM

## 2024-01-26 RX ORDER — OXYCODONE HYDROCHLORIDE 10 MG/1
10 TABLET ORAL EVERY 4 HOURS PRN
Qty: 60 TABLET | Refills: 0 | Status: SHIPPED | OUTPATIENT
Start: 2024-01-26

## 2024-01-26 RX ORDER — PREDNISONE 20 MG/1
20 TABLET ORAL
Qty: 30 TABLET | Refills: 3 | Status: SHIPPED | OUTPATIENT
Start: 2024-01-26

## 2024-01-26 RX ORDER — DIPHENHYDRAMINE HCL 50 MG/1
CAPSULE ORAL
Qty: 90 CAPSULE | Refills: 3 | Status: SHIPPED | OUTPATIENT
Start: 2024-01-26

## 2024-01-26 RX ORDER — PREDNISONE 20 MG/1
20 TABLET ORAL DAILY
Qty: 30 TABLET | Refills: 3 | Status: SHIPPED | OUTPATIENT
Start: 2024-01-26

## 2024-01-28 ENCOUNTER — PATIENT MESSAGE (OUTPATIENT)
Dept: HEMATOLOGY/ONCOLOGY | Facility: CLINIC | Age: 74
End: 2024-01-28
Payer: MEDICARE

## 2024-01-31 DIAGNOSIS — C92.01 ACUTE MYELOID LEUKEMIA IN REMISSION: Primary | ICD-10-CM

## 2024-02-01 ENCOUNTER — INFUSION (OUTPATIENT)
Dept: INFUSION THERAPY | Facility: HOSPITAL | Age: 74
End: 2024-02-01
Payer: MEDICARE

## 2024-02-01 ENCOUNTER — OFFICE VISIT (OUTPATIENT)
Dept: HEMATOLOGY/ONCOLOGY | Facility: CLINIC | Age: 74
End: 2024-02-01
Payer: MEDICARE

## 2024-02-01 VITALS
DIASTOLIC BLOOD PRESSURE: 62 MMHG | RESPIRATION RATE: 18 BRPM | SYSTOLIC BLOOD PRESSURE: 133 MMHG | TEMPERATURE: 98 F | HEART RATE: 77 BPM

## 2024-02-01 VITALS
HEART RATE: 86 BPM | BODY MASS INDEX: 34.5 KG/M2 | SYSTOLIC BLOOD PRESSURE: 135 MMHG | WEIGHT: 202.06 LBS | TEMPERATURE: 99 F | OXYGEN SATURATION: 95 % | DIASTOLIC BLOOD PRESSURE: 58 MMHG | RESPIRATION RATE: 18 BRPM | HEIGHT: 64 IN

## 2024-02-01 DIAGNOSIS — C92.02 AML (ACUTE MYELOID LEUKEMIA) IN RELAPSE: ICD-10-CM

## 2024-02-01 DIAGNOSIS — C92.02 AML (ACUTE MYELOID LEUKEMIA) IN RELAPSE: Primary | ICD-10-CM

## 2024-02-01 DIAGNOSIS — D84.81 IMMUNODEFICIENCY DUE TO CONDITIONS CLASSIFIED ELSEWHERE: ICD-10-CM

## 2024-02-01 DIAGNOSIS — I70.0 AORTIC ATHEROSCLEROSIS: ICD-10-CM

## 2024-02-01 LAB
BLD PROD TYP BPU: NORMAL
BLOOD UNIT EXPIRATION DATE: NORMAL
BLOOD UNIT TYPE CODE: 6200
BLOOD UNIT TYPE: NORMAL
CODING SYSTEM: NORMAL
CROSSMATCH INTERPRETATION: NORMAL
DISPENSE STATUS: NORMAL
UNIT NUMBER: NORMAL

## 2024-02-01 PROCEDURE — 99215 OFFICE O/P EST HI 40 MIN: CPT | Mod: S$PBB,,, | Performed by: INTERNAL MEDICINE

## 2024-02-01 PROCEDURE — 99999 PR PBB SHADOW E&M-EST. PATIENT-LVL IV: CPT | Mod: PBBFAC,,, | Performed by: INTERNAL MEDICINE

## 2024-02-01 PROCEDURE — P9037 PLATE PHERES LEUKOREDU IRRAD: HCPCS | Performed by: INTERNAL MEDICINE

## 2024-02-01 PROCEDURE — 99214 OFFICE O/P EST MOD 30 MIN: CPT | Mod: PBBFAC,25 | Performed by: INTERNAL MEDICINE

## 2024-02-01 PROCEDURE — 36430 TRANSFUSION BLD/BLD COMPNT: CPT

## 2024-02-01 PROCEDURE — 25000003 PHARM REV CODE 250: Performed by: INTERNAL MEDICINE

## 2024-02-01 RX ORDER — DIPHENHYDRAMINE HCL 25 MG
25 CAPSULE ORAL
Status: COMPLETED | OUTPATIENT
Start: 2024-02-01 | End: 2024-02-01

## 2024-02-01 RX ORDER — ACETAMINOPHEN 325 MG/1
650 TABLET ORAL
Status: CANCELLED | OUTPATIENT
Start: 2024-02-01

## 2024-02-01 RX ORDER — DIPHENHYDRAMINE HCL 25 MG
25 CAPSULE ORAL
Status: CANCELLED | OUTPATIENT
Start: 2024-02-01

## 2024-02-01 RX ORDER — HYDROCODONE BITARTRATE AND ACETAMINOPHEN 500; 5 MG/1; MG/1
TABLET ORAL ONCE
Status: CANCELLED | OUTPATIENT
Start: 2024-02-01 | End: 2024-02-01

## 2024-02-01 RX ORDER — ACETAMINOPHEN 325 MG/1
650 TABLET ORAL
Status: COMPLETED | OUTPATIENT
Start: 2024-02-01 | End: 2024-02-01

## 2024-02-01 RX ORDER — HYDROCODONE BITARTRATE AND ACETAMINOPHEN 500; 5 MG/1; MG/1
TABLET ORAL ONCE
Status: DISCONTINUED | OUTPATIENT
Start: 2024-02-01 | End: 2024-02-01 | Stop reason: HOSPADM

## 2024-02-01 RX ADMIN — ACETAMINOPHEN 650 MG: 325 TABLET ORAL at 03:02

## 2024-02-01 RX ADMIN — DIPHENHYDRAMINE HYDROCHLORIDE 25 MG: 25 CAPSULE ORAL at 03:02

## 2024-02-01 NOTE — PROGRESS NOTES
Hematology and Medical Oncology   Follow Up Note     02/01/2024    Primary Oncologic Diagnosis: AML, FLT 3 + and NPMN1+    History of Present Ilness:   Sabrina Badillo) is a pleasant 73 y.o.female presents to clinic following 2 induction therapies for AML. She was in the hospital roughly 50 days to receive 7+3 and then FLAG JENNIFER. Now on active observation.    Oncology History:   68 y.o. female admitted overnight for possible new AML. Came in from OSH with elevated WBC of 100.   05/25/2018: Pt is now on hydrea 2 grams BID, allopurinol 300 mg daily, and IVF. Pt may need rasburicase this weekend. She will undergo a BM bx and aspiration today. She is an induction candidate and will not be screened for research trials. She has anxiety for which she usually takes xanax, this was re-started.   05/26/2018 PICC placed. Reports she slept well last night. Anxiety improved with prn xanax. EF 65%, HIV and Hep panel negative. 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  6/12/2018: Day 15 of 7+3 induction, restaging BM bx done yesterday. On Midostaurin. Fever yesterday and BC with gram + cocci in clusters. On cefepime day 2 and will start Vanc today. Labial mucositis improving.   6/13/2018: Day 16 7+3. BC with staph aureus, identification pending. Surveillance blood cultures done today. Afebrile x 48 hours. On cefepime and Vanc. Labial mucositis resolved. No complaints of pain. Nausea controlled. No diarrhea. Primary complaint is fatigue.   6/14/20018: Day 17 of 7+3. Day 14 bone marrow results pending. BC with staph epidermis only sensitive to Vancomycin. Vanc day 3 and cefepime day 4. Vanc trough 12.2 last night. BP remains low but stable. Afebrile x 72 hours.   6/15/2018: Day 18 of 7+3. Day 14 bone marrow biopsy shows persistent disease with 15% blasts. Discussion with patient regarding treatment and she is deciding if she wants to proceed with re-induction vs outpatient maintenance. Temp of 100.4 overnight, unsustained.  Day 5 Cefepime and Day 4 of Vanc for staph epidermis. Repeat cultures NGTD. BP improved. Electrolytes (mag, phos, K and calcium) replaced aggressively this am and will repeat labs this afternoon. No complaints this am.   06/16/2018: Day 19 of 7+3. Day 2 of Flag-JENNIFER. Remains afebrile. Calcium remains low and have increased PO supplementation. Remains on vanc and aztreonam. Somewhat loopy feeling after receiving benadryl.   06/17/2018: Day 20 of 7+3. Day 3 of FLAG-JENNIFER. Multiple electrolyte abnormalities. New bilateral leg and feet swelling.   6/18/2018: Day 21 of 7+3 and Day 4 of FLAG JENNIFER. Tolerating well with some nausea controlled with Zofran. VSS, afebrile. ANC 1900 on Neupogen. Continues Vanc for staph epi bacteremia. Multiple electrolytes replaced today. Complains of edema to lower extremities, not improved after 20 of lasix yesterday. No sob or CP.   6/19/18: Day 22 of 7+3 and Day 5 of FLAG JENNIFER. VSS, afebrile, ANC 3900. Vanc trough elevated and dose adjusted this am. Lower extremity edema improved after 40 of lasix yesterday, net negative 300 cc I&O. Mild nausea, no abdominal pain or diarrhea. Poor appetite but no difficulty eating or taking pills.   6/20/18: Day 23 of 7+3 and Day 6 of FLAG JENNIFER. Patient complains of nausea and vomiting overnight and this am, especially when taking pills. Will add Zyprexa daily in addition to Zofran, compazine, and ativan PRN and will change meds to IV. 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 Afebrile.  No diarrhea, mouth sores or pain.  06/21/2018: Day 24 of 7+3 and Day 7 of FLAG JENNIFER. Nausea better controlled. Continued on Vanc.  6/22/2018: Day 25 of 7+3 and Day 8 of FLAG JENNIFER. Nausea improved some with Zyprexa but did have 1 episode of emesis overnight.continuing meds IV due to vomiting. Remains afebrile. ANC 0. Continues Vanc and Flagyl. Electrolytes replaced.   06/23/2018 : day 26 of 7+3 and Day 9 of FLAG  JENNIFER.  Nausea persists, worsened after taking pills so meds converted to IV.  Encouraged ambulation.  Continue with flagyl for leptotrichia goodfellowii.  RUQ US showed intrahepatic dilation, overall impression hepatic steatosis.  CBD 0.5cm.  No Gallbladder.    06/24/2018 : day 27 of 7+3 and Day 10 of FLAG JENNIFER.  Nausea persisting, able to tolerate some po pills.  Last day of flagyl (day 5).  Still pancytopenic. Appetite still low as po intake triggers nausea.  06/25/2018: day 28 of 7+3 and Day 11 of FLAG JENNIFER. Afebrile, ANC 0. Has completed Flagyl. Will decrease Vanc to 1000 mg q12, trough 19.9, will complete Vanc on 6/27. Liver enzymes stable on Midostaurin. VSS.   6/26/2018: day 29 of 7+3 and day 12 of FLAG JENNIFER. Liver enzymes improved and Midostaurin increased to full dose 50 mg bid. Nausea controlled, afebrile, VSS, NEON.  6/27/2018: day 30 of 7+3 and Day 13 of FLAG JENNIFER. Persistent nausea and emesis x 1 yesterday. Compazine changed to scheduled. Vanc ends today. Afebrile, VSS. Aggressive electrolyte replacement, low electrolytes possibly due to Midostaurin.   6/28/2018: day 31 of 7+3 and Day 14 of FLAG JENNIFER. Nausea improved with scheduled Compazine. Patient has agreed to start converting some IV meds to po. VSS, afebrile, electrolytes wnl today. Only complains of fatigue, new rash noted to upper back and chest and legs, papular rash mildly red.  6/29/2018: Day 32 of 7+3 and Day 15 of FLAG JENNIFER. Day 14 restaging bone marrow biopsy done at bedside today. Patient states nausea improved but she did have emesis last night after taking 9 pm pills. Rash improved. No mouth sores, diarrhea or pain.   7/2/2018: Day 35 of 7+3 and Day 18 of FLAG JENNIFER. Restaging BM bx results pending. Fluid overload over the weekend. Chest x-ray with pulmonary edema. Os sats down to 88%. Placed on O2 at 2 L NC, off O2 this am. Received lasix. Net negative 2.7 L today. 1 episode of nausea, no emesis. Reports anxiety relieved with PRN meds.  Electrolytes improved, afebrile, VSS.   7/3/2018: Day 36 of 7+3 and Day 19 of FLAG JENNIFER. Restaging Bm bx with GABRIEL. Cardiology consulted for abnormal echo, EF 30% with pulmonary HTN and severe L atrial enlargement. EKG with T wave abnormality, possible anterolateral ischemia and prolonged QTc of 530. Medications adjusted. Nausea controlled, no diarrhea. Electrolytes improved. On O2 as needed.   07/04/2018 : Day 37 of 7+3 and Day 20 of FLAG JENNIFER Diarrhea in AM, watery, no associated abdominal pain, nausea, or vomiting.    07/05/2018: Day 38 of 7+3 ane Day 21 of FLAG JENNIFER. No CP or SOB, cardiology following. On RA. All meds changed to po, denies nausea or vomiting and no diarrhea. VSS, afebrile.   7/6/2018: Day 22 of FLAG JENNIFER. Continues to tolerate po meds with no nausea. Afebrile. Awaiting count recovery for discharge.  7/7/2018-/8/2018: Day 40/41 of 7+3, Day 23/24 of FLAG JENNIFER.  Improving clinically.  Started on mag oxide per patient request.  Labs showing signs of ANC recovery.  7/9/2018: Day 25 FLAG JENNIFER, , continues Neupogen. Received platelets today. Afebrile, VSS. Tolerating all oral meds with no nausea or vomiting. Only complains of fatigue.   7/10/2018: Day 26 FLAG JENNIFER,  today. Had 2 episodes of vomiting and diarrhea last night. Feeling better. Afebrile, VSS.   7/11/2018: Day 27 FLAG JENNIFER,  today. No vomiting or diarrhea overnight and no nausea. Afebrile, VSS. Complains of back pain (bone pain) suspect due to count recovery. Relieved with oxy IR and Zyrtec started.  07/12/2018: Day 28 flag jennifer, engrafted today with ANC of 730. Back pain improved with zyrtec. Scheduled for IT chemo tomorrow at 1:30 pm and discharge home thereafter. Will likely need plt transfusion prior to IT chemo tomorrow   7/13/2018: Day 29 of FLAG JENNIFER. ANC up to 1300 today. Transfusing platelets today prior to LP for IT chemo. Patient continues to complain of bone pain, mostly to back and legs. No nausea, diarrhea, sob.  Afebrile and VSS.   --hospitalized 7/23-7/25 for C.diff diarrhea, neutropenic fever.  While inpatient she developed pink blisters on her palm that were concerning for relapse.  --palm biopsy on 7/26/18 for suspicion of leukemia cutis was also negative for sign of relapse  --bone marrow biopsy 7/31/18 was negative for signs of relapse   --Admitted on 8/14/18 for HiDAC#1  --Admitted on 9/11/18 for HiDAC#2  --Admitted on 10/16/18 for HiDAC#3  --Post consolidation bone marrow biopsy on 12/27/2018: AML FISH (BM) Interpretation: The result is within normal limits for the AML FISH panel. No evidence of residual disease.  --Bone Marrow Biopsy on 5/9/19 NO MORPHOLOGIC EVIDENCE OF RESIDUAL ACUTE MYELOID LEUKEMIA. RECOMMEND NGS STUDY TO RULE OUT RESIDUAL DISEASE. deletion 20q  --Bone Marrow Biopsy on 9/23/2020: CELLULARITY=40-50%, TRILINEAGE HEMATOPOIETIC ACTIVITY (M/E= 0.6:1). ERYTHROID HYPERPLASIA, DYSERYTHROPOIESIS, AND INCREASED BLAST (11%). SEE COMMENT.  FOCAL GRADE 1 RETICULAR FIBROSIS.  --Decitabine + Venetoclax: C1D1: 10/12/2020, C2D1: 11/9  --Bone marrow biopsy on 2/9/2021: VARIABLY CELLULAR MARROW WITH DYSERYTHROPOIESIS, DYSMEGAKARYOPOIESIS AND  MARKED MYELOID HYPOPLASIA.NO MORPHOLOGIC EVIDENCE OF RESIDUAL ACUTE MYELOID LEUKEMIA.    --Bone marrow biopsy on 12/13/23: NO DEFINITIVE MORPHOLOGIC OR IMMUNOPHENOTYPIC EVIDENCE OF RESIDUAL/RELAPSED ACUTE MYELOID LEUKEMIA. 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 Fifteen metaphases with monosomy 7 also had a 20q   deletion. This result indicates persistence or recurrence of this patient's previously described abnormal clone.     Interval History:   Mrs. Mac is a 73 y.o. patient who presents with her  to discuss tolerability of chemotherapy.  Did well with injection azacitadine. Working well to get therapy in [place].    Today she is doing well, but previous week she could largely not get out of bed or the recliner.     Increased bruising over arms  and legs.        Past Medical History:   Past Medical History:   Diagnosis Date    Cancer 03/2018    AML    CHF (congestive heart failure)     Diarrhea 9/14/2018    Encounter for blood transfusion        Current Medications:   Current Outpatient Medications   Medication Sig    ALPRAZolam (XANAX) 2 MG Tab TAKE ONE TABLET BY MOUTH NIGHTLY AS NEEDED    BANOPHEN 50 mg capsule TAKE ONE CAPSULE BY MOUTH NIGHTLY AS NEEDED FOR ITCHING OR INSOMNIA    EScitalopram oxalate (LEXAPRO) 20 MG tablet TAKE ONE TABLET BY MOUTH EACH EVENING    metoprolol succinate (TOPROL-XL) 25 MG 24 hr tablet Take 1 tablet (25 mg total) by mouth once daily.    multivit-minerals/folic acid (MULTIVITAMIN GUMMIES ORAL) Take by mouth.    nitrofurantoin, macrocrystal-monohydrate, (MACROBID) 100 MG capsule Take 1 capsule (100 mg total) by mouth 2 (two) times daily.    omeprazole (PRILOSEC) 20 MG capsule Take 1 capsule (20 mg total) by mouth 2 (two) times daily before meals.    ondansetron (ZOFRAN) 8 MG tablet Take 1 tablet (8 mg total) by mouth every 12 (twelve) hours as needed for Nausea.    ondansetron (ZOFRAN-ODT) 8 MG TbDL Take 8 mg by mouth 3 (three) times daily.    oxyCODONE (ROXICODONE) 10 mg Tab immediate release tablet Take 1 tablet (10 mg total) by mouth every 6 (six) hours as needed (pain).    oxyCODONE (ROXICODONE) 10 mg Tab immediate release tablet Take 1 tablet (10 mg total) by mouth every 4 (four) hours as needed for Pain.    oxyCODONE (ROXICODONE) 20 mg Tab immediate release tablet Take 1 tablet (20 mg total) by mouth every 6 (six) hours as needed (pain).    predniSONE (DELTASONE) 20 MG tablet TAKE ONE TABLET BY MOUTH ONCE DAILY AS NEEDED    predniSONE (DELTASONE) 20 MG tablet Take 1 tablet (20 mg total) by mouth once daily.    tiZANidine (ZANAFLEX) 4 MG tablet Take 1 tablet (4 mg total) by mouth 3 (three) times daily.    venetoclax (VENCLEXTA) 100 mg Tab Take 400 mg (4 tablets) by mouth once daily.    acyclovir 5% (ZOVIRAX) 5 % ointment  Apply topically every 3 (three) hours. (Patient not taking: Reported on 2/1/2024)    methocarbamoL (ROBAXIN) 500 MG Tab Take 1 tablet (500 mg total) by mouth 4 (four) times daily.     No current facility-administered medications for this visit.     ALLERGIES:   Review of patient's allergies indicates:   Allergen Reactions    Methotrexate analogues      Elevated Liver Enzyme    Bactrim [sulfamethoxazole-trimethoprim] Nausea And Vomiting and Rash       Review of Systems:     Review of Systems   Constitutional: Positive for fatigue. Negative for appetite change, chills, diaphoresis, fever and unexpected weight change.   HENT:   Negative for hearing loss, mouth sores, nosebleeds, sore throat, trouble swallowing and voice change.    Eyes: Negative for eye problems and icterus.   Respiratory: Negative for chest tightness, cough, hemoptysis, shortness of breath and wheezing.    Cardiovascular: Negative for chest pain, leg swelling and palpitations.   Gastrointestinal: Negative for abdominal distention, abdominal pain, blood in stool, diarrhea, nausea and vomiting.   Endocrine: Negative for hot flashes.   Genitourinary: Negative for bladder incontinence, difficulty urinating, dysuria and hematuria.    Musculoskeletal: Positive for arthralgias and myalgias. Negative for back pain, flank pain, gait problem, neck pain and neck stiffness.   Skin: Negative for itching, rash and wound.   Neurological: Negative for dizziness, extremity weakness, gait problem, headaches, numbness, seizures and speech difficulty.   Hematological: Negative for adenopathy. Does not  bleed easily.   Psychiatric/Behavioral: Negative for confusion, depression and sleep disturbance. The patient is not nervous/anxious.        Physical Exam:     Vitals:    02/01/24 1309   BP: (!) 135/58   Pulse: 86   Resp: 18   Temp: 98.5 °F (36.9 °C)         Constitutional:       Appearance: She is well-developed. In wheelchair. Generally feels weak.  HENT:      Head:  Normocephalic and atraumatic.      Right Ear: External ear normal.      Left Ear: External ear normal.      Mouth/Throat:      Pharynx: No posterior oropharyngeal erythema.   Eyes:      Conjunctiva/sclera: Conjunctivae normal.   Cardiovascular:      Rate and Rhythm: Normal rate and regular rhythm.      Heart sounds: Normal heart sounds. No murmur heard.     Pulmonary:      Effort: Pulmonary effort is normal. No respiratory distress.      Breath sounds: Normal breath sounds. No stridor. No wheezing or rales.   Abdominal:      General: Bowel sounds are normal. There is no distension.      Palpations: Abdomen is soft.      Tenderness: There is no abdominal tenderness.   Musculoskeletal:         General: Normal range of motion.      Cervical back: Normal range of motion.      Right lower leg: No edema.      Left lower leg: No edema.   Skin:     General: Skin is warm and dry.      Findings: No rash.   Neurological:      Mental Status: She is alert and oriented to person, place, and time.   Psychiatric:         Behavior: Behavior normal.         Thought Content: Thought content normal.         Judgment: Judgment normal.       ECOG Performance Status: (foot note - ECOG PS provided by Eastern Cooperative Oncology Group) 3 - Symptomatic, >50% confined to bed    Karnofsky Performance Score:  60%- Requires Occasional Assistance but is Able to Care for Needs    Labs:   Lab Results   Component Value Date    WBC 0.79 (LL) 02/01/2024    HGB 8.0 (L) 02/01/2024    HCT 27.0 (L) 02/01/2024    PLT 7 (LL) 02/01/2024    ALT 48 (H) 02/01/2024    AST 25 02/01/2024     02/01/2024    K 4.4 02/01/2024     02/01/2024    CREATININE 1.0 02/01/2024    BUN 22 02/01/2024    CO2 25 02/01/2024    TSH 1.913 10/17/2019    INR 0.9 01/31/2021    HGBA1C 6.0 (H) 01/31/2021       Imaging: previous imaging has been reviewed    Assessment and Plan:     Mrs. Mac is a 73 year old female with AML in second remission.     AML  -- Admitted from  [facility] on 5/25/18 early AM with WBC around 100s, for suspected new non-M3 AML  --lo res HLA typing on 5/26/18 and hi res on 5/27/18 done in anticipation of possible need for future transplant   --Pt with two daughters, two full sisters (in their mid 60s, one with brain aneurysm hx, other one without any medical issues), and one full brother (61 y/o healthy).  --NPM1 +, CEBPA (-), FLT3 (+).  On Midostaurin 50 mg PO BID until Day 14 (held starting 6/8 to 6/11). Stopped when FLAG JENNIFER re-induction started. Restarted Midostaurin at 25 mg bid on day 8 of FLAG JENNIFER induction (6/22), increase to full dose 50 mg bid (6/26) as improvement in liver enzymes. Stopped 7/3/18 due to abnormal cardiac echo.  --day 14 bone marrow biopsy completed 6/11 showing persistent disease with 15% CD 34 positive blasts  --Day 60 of FLAG-JENNIFER, tolerating without difficulty except nausea/vomiting  --day 14 restaging bone marrow biopsy done 6/29 without complication, results with no evidence of AML, FLT 3 negative, will need repeat marrow at count recovery outpatient   --recovery marrow showed no evidence of disease  --HiDAC #1 on 8/14/18, HiDAC#2 on 9/11/18  --Cycle 3 delayed 1 week due to pancytopenia. Started on 10/16/18  --recovery bone marrow showed no signs of residual leukemia  --Bone marrow biopsy done last week reveals early relapse. 11% blasts. We discussed the possibility of re-induction, but she is adamantly against. She does not want to be admitted to the hospital.  --Plan for single agent Gilteritinib 120mg has changed based on next gen sequencing that reports FLT-3 negative at this time  --Completed 4 cycles of Decitabine + Venetoclax  --bone marrow biopsy shows a recurrence of monosomy 7 and 20q deletion, which was seen at the time of her initial diagnosis. No overt signs of increased blasts.  --Chemo consent signed for Aza/Venetoclax. First infusion completed   --Given significant drop in counts over the last week will  start triple prophylaxis      Abnormal Liver Function Tests  --ALT and AST and Alk phos have again increased substantially since re-starting midostaurin.   --midostaurin started day 8 at 25 mg bid, monitoring liver enzymes closely and improved. Increasing Midostaurin to 50 mg bid 6/26. Stopped Midostaurin (7/3) due to new diagnosis of systolic heart failure EF 35%.    --permanently discontinued midostaurin   --LFTs fluctuate with tylenol use    Chemotherapy induced cardiomyopathy  --cardiology follow-up as outpatient in 3 months  --repeat 2D echo on 6/15 with preserved EF of 60%.  However 7/2/18 echo with reduced EF 30-35%  --Echo on 8/15/18 was within normal range EF of 60-65%    Rheumatologic issues  --Previously on steroids and methotrexate  --Now on single agent low dose steroids [she self adjusts this medication]  --previously worked with [facility]      30 minutes were spent face to face with the patient and her family to discuss the disease, natural history, treatment options and survival statistics. I have provided the patient with an opportunity to ask questions and have all questions answered to her satisfaction.            Laquita Troy MD  Hematology and Medical Oncology  Bone Marrow Transplant  [facility]             BMT Chart Routing      Follow up with physician Other. 1. went home with hospice -- no further appointment needs   Follow up with [facility]    Provider visit type    Infusion scheduling note    Injection scheduling note    Labs    Imaging    Pharmacy appointment    Other referrals

## 2024-02-01 NOTE — PLAN OF CARE
Problem: Adult Inpatient Plan of Care  Goal: Optimal Comfort and Wellbeing  Intervention: Provide Person-Centered Care  Flowsheets (Taken 2/1/2024 1702)  Trust Relationship/Rapport:   care explained   reassurance provided   choices provided   thoughts/feelings acknowledged   emotional support provided   empathic listening provided   questions answered   questions encouraged

## 2024-02-01 NOTE — PLAN OF CARE
Patient tolerated 1 unit of plts well today. NAD noted upon discharge. PIV removed, catheter tip intact. Discharged home, escorted in WC by .

## 2024-02-05 ENCOUNTER — HOSPITAL ENCOUNTER (INPATIENT)
Facility: HOSPITAL | Age: 74
LOS: 3 days | Discharge: HOME | DRG: 178 | End: 2024-02-08
Attending: INTERNAL MEDICINE | Admitting: HOSPITALIST
Payer: MEDICARE

## 2024-02-05 DIAGNOSIS — F41.8 DEPRESSION WITH ANXIETY: ICD-10-CM

## 2024-02-05 DIAGNOSIS — R50.81 NEUTROPENIC FEVER: ICD-10-CM

## 2024-02-05 DIAGNOSIS — C92.02 AML (ACUTE MYELOID LEUKEMIA) IN RELAPSE: ICD-10-CM

## 2024-02-05 DIAGNOSIS — N39.0 URINARY TRACT INFECTION WITHOUT HEMATURIA, SITE UNSPECIFIED: ICD-10-CM

## 2024-02-05 DIAGNOSIS — F41.9 ANXIETY: ICD-10-CM

## 2024-02-05 DIAGNOSIS — Z74.09 IMPAIRED MOBILITY: ICD-10-CM

## 2024-02-05 DIAGNOSIS — D70.9 FEBRILE NEUTROPENIA: ICD-10-CM

## 2024-02-05 DIAGNOSIS — C92.02 ACUTE MYELOID LEUKEMIA IN RELAPSE: Primary | ICD-10-CM

## 2024-02-05 DIAGNOSIS — D61.818 PANCYTOPENIA: ICD-10-CM

## 2024-02-05 DIAGNOSIS — D70.9 NEUTROPENIC FEVER: ICD-10-CM

## 2024-02-05 DIAGNOSIS — M54.50 LOW BACK PAIN, UNSPECIFIED BACK PAIN LATERALITY, UNSPECIFIED CHRONICITY, UNSPECIFIED WHETHER SCIATICA PRESENT: ICD-10-CM

## 2024-02-05 DIAGNOSIS — K59.00 CONSTIPATION, UNSPECIFIED CONSTIPATION TYPE: ICD-10-CM

## 2024-02-05 DIAGNOSIS — R50.81 FEBRILE NEUTROPENIA: ICD-10-CM

## 2024-02-05 PROBLEM — R49.0 VOICE HOARSENESS: Status: ACTIVE | Noted: 2024-02-05

## 2024-02-05 PROBLEM — D84.81 IMMUNODEFICIENCY DUE TO CONDITIONS CLASSIFIED ELSEWHERE: Status: ACTIVE | Noted: 2024-01-08

## 2024-02-05 LAB
ABO + RH BLD: NORMAL
ADENOVIRUS: NOT DETECTED
ALBUMIN SERPL BCP-MCNC: 2.4 G/DL (ref 3.5–5.2)
ALP SERPL-CCNC: 153 U/L (ref 55–135)
ALT SERPL W/O P-5'-P-CCNC: 107 U/L (ref 10–44)
ANION GAP SERPL CALC-SCNC: 7 MMOL/L (ref 8–16)
AST SERPL-CCNC: 82 U/L (ref 10–40)
BACTERIA #/AREA URNS AUTO: ABNORMAL /HPF
BASOPHILS # BLD AUTO: 0 K/UL (ref 0–0.2)
BASOPHILS NFR BLD: 0 % (ref 0–1.9)
BILIRUB SERPL-MCNC: 3.3 MG/DL (ref 0.1–1)
BILIRUB UR QL STRIP: NEGATIVE
BLD GP AB SCN CELLS X3 SERPL QL: NORMAL
BLD PROD TYP BPU: NORMAL
BLOOD UNIT EXPIRATION DATE: NORMAL
BLOOD UNIT TYPE CODE: 600
BLOOD UNIT TYPE CODE: 6200
BLOOD UNIT TYPE CODE: 6200
BLOOD UNIT TYPE: NORMAL
BORDETELLA PARAPERTUSSIS (IS1001): NOT DETECTED
BORDETELLA PERTUSSIS (PTXP): NOT DETECTED
BUN SERPL-MCNC: 30 MG/DL (ref 8–23)
CALCIUM SERPL-MCNC: 8.1 MG/DL (ref 8.7–10.5)
CHLAMYDIA PNEUMONIAE: NOT DETECTED
CHLORIDE SERPL-SCNC: 105 MMOL/L (ref 95–110)
CLARITY UR REFRACT.AUTO: ABNORMAL
CO2 SERPL-SCNC: 20 MMOL/L (ref 23–29)
CODING SYSTEM: NORMAL
COLOR UR AUTO: YELLOW
CORONAVIRUS 229E, COMMON COLD VIRUS: NOT DETECTED
CORONAVIRUS HKU1, COMMON COLD VIRUS: NOT DETECTED
CORONAVIRUS NL63, COMMON COLD VIRUS: NOT DETECTED
CORONAVIRUS OC43, COMMON COLD VIRUS: NOT DETECTED
CREAT SERPL-MCNC: 1.4 MG/DL (ref 0.5–1.4)
CROSSMATCH INTERPRETATION: NORMAL
DIFFERENTIAL METHOD BLD: ABNORMAL
DISPENSE STATUS: NORMAL
EOSINOPHIL # BLD AUTO: 0 K/UL (ref 0–0.5)
EOSINOPHIL NFR BLD: 0 % (ref 0–8)
ERYTHROCYTE [DISTWIDTH] IN BLOOD BY AUTOMATED COUNT: 21.2 % (ref 11.5–14.5)
EST. GFR  (NO RACE VARIABLE): 40 ML/MIN/1.73 M^2
FLUBV RNA NPH QL NAA+NON-PROBE: NOT DETECTED
GLUCOSE SERPL-MCNC: 110 MG/DL (ref 70–110)
GLUCOSE UR QL STRIP: NEGATIVE
HCT VFR BLD AUTO: 17.7 % (ref 37–48.5)
HGB BLD-MCNC: 5.3 G/DL (ref 12–16)
HGB UR QL STRIP: ABNORMAL
HPIV1 RNA NPH QL NAA+NON-PROBE: NOT DETECTED
HPIV2 RNA NPH QL NAA+NON-PROBE: NOT DETECTED
HPIV3 RNA NPH QL NAA+NON-PROBE: NOT DETECTED
HPIV4 RNA NPH QL NAA+NON-PROBE: NOT DETECTED
HUMAN METAPNEUMOVIRUS: NOT DETECTED
HYALINE CASTS UR QL AUTO: 0 /LPF
IMM GRANULOCYTES # BLD AUTO: 0 K/UL (ref 0–0.04)
IMM GRANULOCYTES NFR BLD AUTO: 0 % (ref 0–0.5)
INFLUENZA A (SUBTYPES H1,H1-2009,H3): NOT DETECTED
KETONES UR QL STRIP: NEGATIVE
LACTATE SERPL-SCNC: 1.3 MMOL/L (ref 0.5–2.2)
LEUKOCYTE ESTERASE UR QL STRIP: ABNORMAL
LYMPHOCYTES # BLD AUTO: 0.3 K/UL (ref 1–4.8)
LYMPHOCYTES NFR BLD: 93.9 % (ref 18–48)
MAGNESIUM SERPL-MCNC: 1.4 MG/DL (ref 1.6–2.6)
MCH RBC QN AUTO: 29.9 PG (ref 27–31)
MCHC RBC AUTO-ENTMCNC: 29.9 G/DL (ref 32–36)
MCV RBC AUTO: 100 FL (ref 82–98)
MICROSCOPIC COMMENT: ABNORMAL
MONOCYTES # BLD AUTO: 0 K/UL (ref 0.3–1)
MONOCYTES NFR BLD: 3 % (ref 4–15)
MYCOPLASMA PNEUMONIAE: NOT DETECTED
NEUTROPHILS # BLD AUTO: 0 K/UL (ref 1.8–7.7)
NEUTROPHILS NFR BLD: 3.1 % (ref 38–73)
NITRITE UR QL STRIP: NEGATIVE
NON-SQ EPI CELLS #/AREA URNS AUTO: 3 /HPF
NRBC BLD-RTO: 6 /100 WBC
NUM UNITS TRANS PACKED RBC: NORMAL
NUM UNITS TRANS PACKED RBC: NORMAL
PH UR STRIP: 6 [PH] (ref 5–8)
PHOSPHATE SERPL-MCNC: 3.5 MG/DL (ref 2.7–4.5)
PLATELET # BLD AUTO: 6 K/UL (ref 150–450)
PLATELET BLD QL SMEAR: ABNORMAL
PMV BLD AUTO: ABNORMAL FL (ref 9.2–12.9)
POTASSIUM SERPL-SCNC: 4.6 MMOL/L (ref 3.5–5.1)
PROT SERPL-MCNC: 4.6 G/DL (ref 6–8.4)
PROT UR QL STRIP: ABNORMAL
RBC # BLD AUTO: 1.77 M/UL (ref 4–5.4)
RBC #/AREA URNS AUTO: 68 /HPF (ref 0–4)
RESPIRATORY INFECTION PANEL SOURCE: ABNORMAL
RSV RNA NPH QL NAA+NON-PROBE: NOT DETECTED
RV+EV RNA NPH QL NAA+NON-PROBE: NOT DETECTED
SARS-COV-2 RDRP RESP QL NAA+PROBE: NEGATIVE
SARS-COV-2 RNA RESP QL NAA+PROBE: DETECTED
SODIUM SERPL-SCNC: 132 MMOL/L (ref 136–145)
SP GR UR STRIP: 1.02 (ref 1–1.03)
SPECIMEN OUTDATE: NORMAL
SQUAMOUS #/AREA URNS AUTO: 3 /HPF
UNIT NUMBER: NORMAL
URN SPEC COLLECT METH UR: ABNORMAL
WBC # BLD AUTO: 0.33 K/UL (ref 3.9–12.7)
WBC #/AREA URNS AUTO: 27 /HPF (ref 0–5)
WBC CLUMPS UR QL AUTO: ABNORMAL

## 2024-02-05 PROCEDURE — A4216 STERILE WATER/SALINE, 10 ML: HCPCS | Performed by: INTERNAL MEDICINE

## 2024-02-05 PROCEDURE — 27000207 HC ISOLATION

## 2024-02-05 PROCEDURE — XW033E5 INTRODUCTION OF REMDESIVIR ANTI-INFECTIVE INTO PERIPHERAL VEIN, PERCUTANEOUS APPROACH, NEW TECHNOLOGY GROUP 5: ICD-10-PCS | Performed by: HOSPITALIST

## 2024-02-05 PROCEDURE — 63600175 PHARM REV CODE 636 W HCPCS: Performed by: HOSPITALIST

## 2024-02-05 PROCEDURE — 25000003 PHARM REV CODE 250

## 2024-02-05 PROCEDURE — 80053 COMPREHEN METABOLIC PANEL: CPT | Performed by: HOSPITALIST

## 2024-02-05 PROCEDURE — 25000003 PHARM REV CODE 250: Performed by: INTERNAL MEDICINE

## 2024-02-05 PROCEDURE — 85025 COMPLETE CBC W/AUTO DIFF WBC: CPT | Performed by: HOSPITALIST

## 2024-02-05 PROCEDURE — 36415 COLL VENOUS BLD VENIPUNCTURE: CPT | Performed by: HOSPITALIST

## 2024-02-05 PROCEDURE — P9037 PLATE PHERES LEUKOREDU IRRAD: HCPCS

## 2024-02-05 PROCEDURE — 25000003 PHARM REV CODE 250: Performed by: HOSPITALIST

## 2024-02-05 PROCEDURE — 30233R0 TRANSFUSION OF AUTOLOGOUS PLATELETS INTO PERIPHERAL VEIN, PERCUTANEOUS APPROACH: ICD-10-PCS | Performed by: HOSPITALIST

## 2024-02-05 PROCEDURE — 36415 COLL VENOUS BLD VENIPUNCTURE: CPT | Mod: XB

## 2024-02-05 PROCEDURE — 36410 VNPNXR 3YR/> PHY/QHP DX/THER: CPT

## 2024-02-05 PROCEDURE — P9040 RBC LEUKOREDUCED IRRADIATED: HCPCS

## 2024-02-05 PROCEDURE — 87040 BLOOD CULTURE FOR BACTERIA: CPT

## 2024-02-05 PROCEDURE — 63600175 PHARM REV CODE 636 W HCPCS

## 2024-02-05 PROCEDURE — C1751 CATH, INF, PER/CENT/MIDLINE: HCPCS

## 2024-02-05 PROCEDURE — 76937 US GUIDE VASCULAR ACCESS: CPT

## 2024-02-05 PROCEDURE — 20600001 HC STEP DOWN PRIVATE ROOM

## 2024-02-05 PROCEDURE — 36430 TRANSFUSION BLD/BLD COMPNT: CPT

## 2024-02-05 PROCEDURE — U0002 COVID-19 LAB TEST NON-CDC: HCPCS | Performed by: HOSPITALIST

## 2024-02-05 PROCEDURE — 86920 COMPATIBILITY TEST SPIN: CPT

## 2024-02-05 PROCEDURE — 83735 ASSAY OF MAGNESIUM: CPT | Performed by: HOSPITALIST

## 2024-02-05 PROCEDURE — 36415 COLL VENOUS BLD VENIPUNCTURE: CPT | Mod: XB | Performed by: INTERNAL MEDICINE

## 2024-02-05 PROCEDURE — 83605 ASSAY OF LACTIC ACID: CPT

## 2024-02-05 PROCEDURE — 87633 RESP VIRUS 12-25 TARGETS: CPT

## 2024-02-05 PROCEDURE — 30233N0 TRANSFUSION OF AUTOLOGOUS RED BLOOD CELLS INTO PERIPHERAL VEIN, PERCUTANEOUS APPROACH: ICD-10-PCS | Performed by: HOSPITALIST

## 2024-02-05 PROCEDURE — 86901 BLOOD TYPING SEROLOGIC RH(D): CPT | Performed by: INTERNAL MEDICINE

## 2024-02-05 PROCEDURE — 87086 URINE CULTURE/COLONY COUNT: CPT | Performed by: HOSPITALIST

## 2024-02-05 PROCEDURE — 84100 ASSAY OF PHOSPHORUS: CPT | Performed by: HOSPITALIST

## 2024-02-05 PROCEDURE — 63600175 PHARM REV CODE 636 W HCPCS: Mod: JZ,TB | Performed by: INTERNAL MEDICINE

## 2024-02-05 PROCEDURE — 81001 URINALYSIS AUTO W/SCOPE: CPT | Performed by: HOSPITALIST

## 2024-02-05 RX ORDER — GLUCAGON 1 MG
1 KIT INJECTION
Status: DISCONTINUED | OUTPATIENT
Start: 2024-02-05 | End: 2024-02-08 | Stop reason: HOSPADM

## 2024-02-05 RX ORDER — HYDROCODONE BITARTRATE AND ACETAMINOPHEN 500; 5 MG/1; MG/1
TABLET ORAL
Status: DISCONTINUED | OUTPATIENT
Start: 2024-02-05 | End: 2024-02-07

## 2024-02-05 RX ORDER — SODIUM CHLORIDE, SODIUM LACTATE, POTASSIUM CHLORIDE, CALCIUM CHLORIDE 600; 310; 30; 20 MG/100ML; MG/100ML; MG/100ML; MG/100ML
INJECTION, SOLUTION INTRAVENOUS CONTINUOUS
Status: ACTIVE | OUTPATIENT
Start: 2024-02-05 | End: 2024-02-05

## 2024-02-05 RX ORDER — IPRATROPIUM BROMIDE AND ALBUTEROL SULFATE 2.5; .5 MG/3ML; MG/3ML
3 SOLUTION RESPIRATORY (INHALATION) EVERY 4 HOURS PRN
Status: DISCONTINUED | OUTPATIENT
Start: 2024-02-05 | End: 2024-02-08 | Stop reason: HOSPADM

## 2024-02-05 RX ORDER — TIZANIDINE 2 MG/1
4 TABLET ORAL 3 TIMES DAILY PRN
Status: DISCONTINUED | OUTPATIENT
Start: 2024-02-05 | End: 2024-02-08 | Stop reason: HOSPADM

## 2024-02-05 RX ORDER — POSACONAZOLE 100 MG/1
300 TABLET, DELAYED RELEASE ORAL DAILY
Status: DISCONTINUED | OUTPATIENT
Start: 2024-02-05 | End: 2024-02-08 | Stop reason: HOSPADM

## 2024-02-05 RX ORDER — POSACONAZOLE 100 MG/1
300 TABLET, DELAYED RELEASE ORAL 2 TIMES DAILY
Status: COMPLETED | OUTPATIENT
Start: 2024-02-05 | End: 2024-02-05

## 2024-02-05 RX ORDER — SODIUM CHLORIDE 0.9 % (FLUSH) 0.9 %
10 SYRINGE (ML) INJECTION
Status: DISCONTINUED | OUTPATIENT
Start: 2024-02-05 | End: 2024-02-08 | Stop reason: HOSPADM

## 2024-02-05 RX ORDER — ACETAMINOPHEN 325 MG/1
650 TABLET ORAL EVERY 4 HOURS PRN
Status: DISCONTINUED | OUTPATIENT
Start: 2024-02-05 | End: 2024-02-08 | Stop reason: HOSPADM

## 2024-02-05 RX ORDER — POTASSIUM CHLORIDE 20 MEQ/1
20 TABLET, EXTENDED RELEASE ORAL
Status: DISCONTINUED | OUTPATIENT
Start: 2024-02-05 | End: 2024-02-08 | Stop reason: HOSPADM

## 2024-02-05 RX ORDER — IBUPROFEN 200 MG
16 TABLET ORAL
Status: DISCONTINUED | OUTPATIENT
Start: 2024-02-05 | End: 2024-02-08 | Stop reason: HOSPADM

## 2024-02-05 RX ORDER — ONDANSETRON HYDROCHLORIDE 2 MG/ML
4 INJECTION, SOLUTION INTRAVENOUS EVERY 6 HOURS PRN
Status: DISCONTINUED | OUTPATIENT
Start: 2024-02-05 | End: 2024-02-08 | Stop reason: HOSPADM

## 2024-02-05 RX ORDER — LANOLIN ALCOHOL/MO/W.PET/CERES
800 CREAM (GRAM) TOPICAL EVERY 4 HOURS PRN
Status: DISCONTINUED | OUTPATIENT
Start: 2024-02-05 | End: 2024-02-08 | Stop reason: HOSPADM

## 2024-02-05 RX ORDER — ALPRAZOLAM 0.5 MG/1
2 TABLET ORAL NIGHTLY PRN
Status: DISCONTINUED | OUTPATIENT
Start: 2024-02-05 | End: 2024-02-05

## 2024-02-05 RX ORDER — PANTOPRAZOLE SODIUM 40 MG/1
40 TABLET, DELAYED RELEASE ORAL DAILY
Status: DISCONTINUED | OUTPATIENT
Start: 2024-02-05 | End: 2024-02-08 | Stop reason: HOSPADM

## 2024-02-05 RX ORDER — IBUPROFEN 200 MG
24 TABLET ORAL
Status: DISCONTINUED | OUTPATIENT
Start: 2024-02-05 | End: 2024-02-08 | Stop reason: HOSPADM

## 2024-02-05 RX ORDER — SODIUM CHLORIDE 0.9 % (FLUSH) 0.9 %
10 SYRINGE (ML) INJECTION EVERY 6 HOURS
Status: DISCONTINUED | OUTPATIENT
Start: 2024-02-05 | End: 2024-02-08 | Stop reason: HOSPADM

## 2024-02-05 RX ORDER — LACTULOSE 10 G/15ML
30 SOLUTION ORAL 3 TIMES DAILY
Status: DISCONTINUED | OUTPATIENT
Start: 2024-02-05 | End: 2024-02-05

## 2024-02-05 RX ORDER — ACYCLOVIR 200 MG/1
400 CAPSULE ORAL 2 TIMES DAILY
Status: DISCONTINUED | OUTPATIENT
Start: 2024-02-05 | End: 2024-02-08 | Stop reason: HOSPADM

## 2024-02-05 RX ORDER — OXYCODONE HYDROCHLORIDE 10 MG/1
10 TABLET ORAL EVERY 4 HOURS PRN
Status: DISCONTINUED | OUTPATIENT
Start: 2024-02-05 | End: 2024-02-08 | Stop reason: HOSPADM

## 2024-02-05 RX ORDER — SODIUM CHLORIDE, SODIUM LACTATE, POTASSIUM CHLORIDE, CALCIUM CHLORIDE 600; 310; 30; 20 MG/100ML; MG/100ML; MG/100ML; MG/100ML
INJECTION, SOLUTION INTRAVENOUS CONTINUOUS
Status: DISCONTINUED | OUTPATIENT
Start: 2024-02-05 | End: 2024-02-08 | Stop reason: HOSPADM

## 2024-02-05 RX ORDER — METOPROLOL SUCCINATE 25 MG/1
25 TABLET, EXTENDED RELEASE ORAL DAILY
Status: DISCONTINUED | OUTPATIENT
Start: 2024-02-05 | End: 2024-02-08 | Stop reason: HOSPADM

## 2024-02-05 RX ORDER — LIDOCAINE 50 MG/G
1 PATCH TOPICAL
Status: DISCONTINUED | OUTPATIENT
Start: 2024-02-05 | End: 2024-02-08 | Stop reason: HOSPADM

## 2024-02-05 RX ORDER — NALOXONE HCL 0.4 MG/ML
0.02 VIAL (ML) INJECTION
Status: DISCONTINUED | OUTPATIENT
Start: 2024-02-05 | End: 2024-02-08 | Stop reason: HOSPADM

## 2024-02-05 RX ORDER — POLYETHYLENE GLYCOL 3350 17 G/17G
17 POWDER, FOR SOLUTION ORAL 2 TIMES DAILY
Status: DISCONTINUED | OUTPATIENT
Start: 2024-02-05 | End: 2024-02-05

## 2024-02-05 RX ORDER — SENNOSIDES 8.6 MG/1
8.6 TABLET ORAL DAILY
Status: DISCONTINUED | OUTPATIENT
Start: 2024-02-06 | End: 2024-02-08 | Stop reason: HOSPADM

## 2024-02-05 RX ORDER — LORAZEPAM 1 MG/1
2 TABLET ORAL NIGHTLY PRN
Status: DISCONTINUED | OUTPATIENT
Start: 2024-02-05 | End: 2024-02-08 | Stop reason: HOSPADM

## 2024-02-05 RX ORDER — LANOLIN ALCOHOL/MO/W.PET/CERES
400 CREAM (GRAM) TOPICAL EVERY 4 HOURS PRN
Status: DISCONTINUED | OUTPATIENT
Start: 2024-02-05 | End: 2024-02-08 | Stop reason: HOSPADM

## 2024-02-05 RX ORDER — SODIUM CHLORIDE 0.9 % (FLUSH) 0.9 %
10 SYRINGE (ML) INJECTION
Status: DISCONTINUED | OUTPATIENT
Start: 2024-02-05 | End: 2024-02-07

## 2024-02-05 RX ORDER — PREDNISONE 20 MG/1
20 TABLET ORAL DAILY
Status: DISCONTINUED | OUTPATIENT
Start: 2024-02-05 | End: 2024-02-08 | Stop reason: HOSPADM

## 2024-02-05 RX ORDER — ONDANSETRON 4 MG/1
4 TABLET, ORALLY DISINTEGRATING ORAL EVERY 6 HOURS PRN
Status: DISCONTINUED | OUTPATIENT
Start: 2024-02-05 | End: 2024-02-08 | Stop reason: HOSPADM

## 2024-02-05 RX ORDER — ESCITALOPRAM OXALATE 10 MG/1
20 TABLET ORAL NIGHTLY
Status: DISCONTINUED | OUTPATIENT
Start: 2024-02-05 | End: 2024-02-08 | Stop reason: HOSPADM

## 2024-02-05 RX ADMIN — Medication 10 ML: at 12:02

## 2024-02-05 RX ADMIN — Medication 400 MG: at 06:02

## 2024-02-05 RX ADMIN — OXYCODONE HYDROCHLORIDE 10 MG: 10 TABLET ORAL at 09:02

## 2024-02-05 RX ADMIN — METOPROLOL SUCCINATE 25 MG: 25 TABLET, EXTENDED RELEASE ORAL at 10:02

## 2024-02-05 RX ADMIN — OXYCODONE HYDROCHLORIDE 10 MG: 10 TABLET ORAL at 11:02

## 2024-02-05 RX ADMIN — Medication 400 MG: at 10:02

## 2024-02-05 RX ADMIN — ACYCLOVIR 400 MG: 200 CAPSULE ORAL at 08:02

## 2024-02-05 RX ADMIN — CEFEPIME 2 G: 2 INJECTION, POWDER, FOR SOLUTION INTRAVENOUS at 04:02

## 2024-02-05 RX ADMIN — Medication 400 MG: at 01:02

## 2024-02-05 RX ADMIN — LORAZEPAM 2 MG: 1 TABLET ORAL at 08:02

## 2024-02-05 RX ADMIN — ALPRAZOLAM 1 MG: 0.5 TABLET ORAL at 02:02

## 2024-02-05 RX ADMIN — PANTOPRAZOLE SODIUM 40 MG: 40 TABLET, DELAYED RELEASE ORAL at 10:02

## 2024-02-05 RX ADMIN — SODIUM CHLORIDE, POTASSIUM CHLORIDE, SODIUM LACTATE AND CALCIUM CHLORIDE: 600; 310; 30; 20 INJECTION, SOLUTION INTRAVENOUS at 04:02

## 2024-02-05 RX ADMIN — POSACONAZOLE 300 MG: 100 TABLET, DELAYED RELEASE ORAL at 10:02

## 2024-02-05 RX ADMIN — ESCITALOPRAM OXALATE 20 MG: 20 TABLET, FILM COATED ORAL at 08:02

## 2024-02-05 RX ADMIN — POSACONAZOLE 300 MG: 100 TABLET, DELAYED RELEASE ORAL at 08:02

## 2024-02-05 RX ADMIN — LACTULOSE 30 G: 20 SOLUTION ORAL at 05:02

## 2024-02-05 RX ADMIN — Medication 10 ML: at 11:02

## 2024-02-05 RX ADMIN — ACYCLOVIR 400 MG: 200 CAPSULE ORAL at 10:02

## 2024-02-05 RX ADMIN — ONDANSETRON 4 MG: 2 INJECTION INTRAMUSCULAR; INTRAVENOUS at 09:02

## 2024-02-05 RX ADMIN — LIDOCAINE 1 PATCH: 700 PATCH TOPICAL at 04:02

## 2024-02-05 RX ADMIN — PREDNISONE 20 MG: 20 TABLET ORAL at 10:02

## 2024-02-05 RX ADMIN — POSACONAZOLE 300 MG: 100 TABLET, DELAYED RELEASE ORAL at 12:02

## 2024-02-05 RX ADMIN — REMDESIVIR 200 MG: 100 INJECTION, POWDER, LYOPHILIZED, FOR SOLUTION INTRAVENOUS at 11:02

## 2024-02-05 RX ADMIN — SODIUM CHLORIDE, POTASSIUM CHLORIDE, SODIUM LACTATE AND CALCIUM CHLORIDE: 600; 310; 30; 20 INJECTION, SOLUTION INTRAVENOUS at 06:02

## 2024-02-05 NOTE — ASSESSMENT & PLAN NOTE
Transferred for [facility] per family request  Tmax at [facility] 99  Afebrile on arrival  Neutropenic precautions  Continue cefepime for UTI   Labs pending

## 2024-02-05 NOTE — ASSESSMENT & PLAN NOTE
- continue home escitalpram nightly   - Switched prn xanax to prn ativan due to medication interaction with posaconazole and xanax.

## 2024-02-05 NOTE — ASSESSMENT & PLAN NOTE
Last bowel movement 4 days ago   CT abd at [facility] noted constipation   Start lactulose

## 2024-02-05 NOTE — PROGRESS NOTES
Adelso Thacker - Oncology ([facility])  Hematology  Bone Marrow Transplant  Progress Note    Patient Name: Sabrina Mac  Admission Date: 2/5/2024  Hospital Length of Stay: 0 days  Code Status: Full Code    Subjective:     Interval History: Dr. Troy patient with AML on aza/ kirby. Chemotherapy on hold while admitted in the setting of infection.Transferred from [facility] for neutropenic fever. Patient previously completed antibiotics for UTI. Had recurrence of symptoms with new back pain. Presented to ER. Lactic acid was elevated at 2.7, resulted at 1.3 today. Chest x-ray without acute process. CT abdomen with L non-obstructive kidney stone. Patient received ceftriaxone and cefepime. Repeat urinalysis with occult blood and trace leukocytes, nitrates negative. Urine culture pending. Is continuing cefepime at [facility]. Blood cultures completed on 2/5. Patient experiencing new vocal hoarseness. Respiratory viral panel ordered. Oxygen via nasal cannula being administered. 2 units of blood and 1 unit of platelets ordered today.      Objective:     Vital Signs (Most Recent):  Temp: 98.1 °F (36.7 °C) (02/05/24 1342)  Pulse: 87 (02/05/24 1342)  Resp: 18 (02/05/24 1342)  BP: 130/61 (02/05/24 1342)  SpO2: 97 % (02/05/24 1342) Vital Signs (24h Range):  Temp:  [97.9 °F (36.6 °C)-108 °F (42.2 °C)] 98.1 °F (36.7 °C)  Pulse:  [] 87  Resp:  [16-20] 18  SpO2:  [96 %-100 %] 97 %  BP: ()/(40-76) 130/61        There is no height or weight on file to calculate BMI.  There is no height or weight on file to calculate BSA.      Intake/Output - Last 3 Shifts         02/03 0700  02/04 0659 02/04 0700 02/05 0659 02/05 0700  02/06 0659    P.O.  500     Blood   350    Total Intake  500 350    Urine  200     Total Output  200     Net  +300 +350           Urine Occurrence  1 x     Stool Occurrence   1 x             Physical Exam  Eyes:      Extraocular Movements: Extraocular movements intact.      Conjunctiva/sclera: Conjunctivae  normal.   Cardiovascular:      Rate and Rhythm: Normal rate and regular rhythm.      Heart sounds: Normal heart sounds.   Abdominal:      General: Bowel sounds are normal. There is no distension.      Palpations: Abdomen is soft.      Tenderness: There is abdominal tenderness. There is no guarding.   Musculoskeletal:      Right lower leg: No edema.      Left lower leg: No edema.   Skin:     Findings: Bruising present.   Neurological:      Mental Status: She is alert and oriented to person, place, and time.      Motor: Weakness present.   Psychiatric:         Behavior: Behavior normal.            Significant Labs:   CBC:   Recent Labs   Lab 02/04/24  1513 02/05/24  0437   WBC 0.3* 0.33*   HGB 7.3* 5.3*   HCT 23.4* 17.7*   PLT 14* 6*    and CMP:   Recent Labs   Lab 02/05/24  0437   *   K 4.6      CO2 20*      BUN 30*   CREATININE 1.4   CALCIUM 8.1*   PROT 4.6*   ALBUMIN 2.4*   BILITOT 3.3*   ALKPHOS 153*   AST 82*   *   ANIONGAP 7*       Diagnostic Results:  I have reviewed all pertinent imaging results/findings within the past 24 hours.  Assessment/Plan:     * Neutropenic fever  - Transferred for [facility] per family request  - Tmax at [facility] 99  - Afebrile since arrival to [facility]  - Continue cefepime for UTI pending ongoing infectious work up  - Lactic acid repeated with decrease from 2.7 to 1.3   - Blood cultures x 2 ordered on 2/5/24  - Respiratory viral panel ordered on 2/5/24- results pending   - Lactated ringers 100ml/hr for blood pressure and kidney support.        Voice hoarseness  - Chest x-ray without acute process.   - respiratory viral panel on 2/5/24 pending results     Constipation  - CT abd at [facility] noted constipation   - Start lactulose upon arrival. 2 bowel movements on 2/5. Transitioned to maintenance senna daily on 2/6.     UTI (urinary tract infection)  Management per neutropenic fever     Immunodeficiency due to conditions classified elsewhere  -  Started on acyclovir 400mg BID  - Started posaconazole 300mg daily. 300mg BID administered as starting dose on 2/5.   - Will transition to Levaquin oral antibiotics upon completion of cefepime     Pancytopenia  - Transfuse 1 unit of PRBC for hgb <7 or symptomatic.   - Transfuse 1 unit of platelets for platelets <10 or symptomatic.   - 2 units of blood and 1 unit of platelets ordered on 2/5.       AML (acute myeloblastic leukemia)  - Diagnosed in 2018. S/P 7+3+mido. FLAG-JENNIFER reinduction. HiDAC x 3 consolidation. Transitioned to observation. Increase in blast of 11% on bone marrow in 2020. Received 4 cycles of venclexta and dacogen with response. 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 Began aza x 5 days, kirby 400mg x 28 days on 1/16/24.   - Chemotherapy on hold while admitted.   - Will resume azacitidine x 5 days and venclexta 100mg days 1-14 of a 28 day cycle. Venclexta dose reduced due to interaction with posaconazole. With increase or ongoing cytopenias, venclexta dose may be decreased to 50mg. Venclexta cycle decreased to help with cytopenias.   - Will need to discuss when to repeat bone marrow biopsy to evaluate for disease response.     Back pain  - Lidocaine patch ordered   - Oxycodone ordered prn   - Non-obstructing kidney stone noted on CT from [facility]       Depression with anxiety  - continue home escitalpram nightly   - Switched prn xanax to prn ativan due to medication interaction with posaconazole and xanax.         VTE Risk Mitigation (From admission, onward)           Ordered     Reason for No Pharmacological VTE Prophylaxis  Once        Question:  Reasons:  Answer:  Thrombocytopenia    02/05/24 0153                    Disposition: Remain inpatient     Liliam Ennis NP  Bone Marrow Transplant  Adelso Thacker - Oncology ([facility])

## 2024-02-05 NOTE — ASSESSMENT & PLAN NOTE
- Started on acyclovir 400mg BID  - Started posaconazole 300mg daily. 300mg BID administered as starting dose on 2/5.   - Will transition to Levaquin oral antibiotics upon completion of cefepime

## 2024-02-05 NOTE — SUBJECTIVE & OBJECTIVE
Past Medical History:   Diagnosis Date    Cancer 03/2018    AML    CHF (congestive heart failure)     Diarrhea 9/14/2018    Encounter for blood transfusion        Past Surgical History:   Procedure Laterality Date    BONE MARROW BIOPSY Left 12/27/2018    Procedure: Biopsy-bone marrow;  Surgeon: Laquita Troy MD;  Location: [facility] OR 2ND FLR;  Service: Orthopedics;  Laterality: Left;    BONE MARROW BIOPSY Left 5/9/2019    Procedure: Biopsy-bone marrow;  Surgeon: Laquita rToy MD;  Location: [facility] OR 2ND FLR;  Service: Orthopedics;  Laterality: Left;    BONE MARROW BIOPSY N/A 12/13/2023    Procedure: Biopsy-bone marrow;  Surgeon: Laquita Troy MD;  Location: [facility] ENDO (4TH FLR);  Service: Orthopedics;  Laterality: N/A;    CHOLECYSTECTOMY      HEMIARTHROPLASTY OF HIP Right 2/1/2021    Procedure: HEMIARTHROPLASTY, HIP (right) - Jonathan, peg board;  Surgeon: Hector Miller MD;  Location: [facility] OR 2ND FLR;  Service: Orthopedics;  Laterality: Right;    HYSTERECTOMY      TONSILLECTOMY         Review of patient's allergies indicates:   Allergen Reactions    Methotrexate analogues      Elevated Liver Enzyme    Sulfa (sulfonamide antibiotics)      Rash, nausea, vomiting    Bactrim [sulfamethoxazole-trimethoprim] Nausea And Vomiting and Rash       No current facility-administered medications on file prior to encounter.     Current Outpatient Medications on File Prior to Encounter   Medication Sig    acyclovir 5% (ZOVIRAX) 5 % ointment Apply topically every 3 (three) hours. (Patient not taking: Reported on 2/1/2024)    ALPRAZolam (XANAX) 2 MG Tab TAKE ONE TABLET BY MOUTH NIGHTLY AS NEEDED    BANOPHEN 50 mg capsule TAKE ONE CAPSULE BY MOUTH NIGHTLY AS NEEDED FOR ITCHING OR INSOMNIA    EScitalopram oxalate (LEXAPRO) 20 MG tablet TAKE ONE TABLET BY MOUTH EACH EVENING    methocarbamoL (ROBAXIN) 500 MG Tab Take 1 tablet (500 mg total) by mouth 4 (four) times daily.    metoprolol succinate (TOPROL-XL) 25 MG 24 hr tablet Take  1 tablet (25 mg total) by mouth once daily.    multivit-minerals/folic acid (MULTIVITAMIN GUMMIES ORAL) Take by mouth.    nitrofurantoin, macrocrystal-monohydrate, (MACROBID) 100 MG capsule Take 1 capsule (100 mg total) by mouth 2 (two) times daily.    omeprazole (PRILOSEC) 20 MG capsule Take 1 capsule (20 mg total) by mouth 2 (two) times daily before meals.    ondansetron (ZOFRAN) 8 MG tablet Take 1 tablet (8 mg total) by mouth every 12 (twelve) hours as needed for Nausea.    ondansetron (ZOFRAN-ODT) 8 MG TbDL Take 8 mg by mouth 3 (three) times daily.    oxyCODONE (ROXICODONE) 10 mg Tab immediate release tablet Take 1 tablet (10 mg total) by mouth every 6 (six) hours as needed (pain).    oxyCODONE (ROXICODONE) 10 mg Tab immediate release tablet Take 1 tablet (10 mg total) by mouth every 4 (four) hours as needed for Pain.    oxyCODONE (ROXICODONE) 20 mg Tab immediate release tablet Take 1 tablet (20 mg total) by mouth every 6 (six) hours as needed (pain).    predniSONE (DELTASONE) 20 MG tablet TAKE ONE TABLET BY MOUTH ONCE DAILY AS NEEDED    predniSONE (DELTASONE) 20 MG tablet Take 1 tablet (20 mg total) by mouth once daily.    tiZANidine (ZANAFLEX) 4 MG tablet Take 1 tablet (4 mg total) by mouth 3 (three) times daily.    venetoclax (VENCLEXTA) 100 mg Tab Take 400 mg (4 tablets) by mouth once daily.     Family History       Problem Relation (Age of Onset)    Cancer Mother, Father          Tobacco Use    Smoking status: Former     Current packs/day: 0.00     Average packs/day: 0.5 packs/day for 10.0 years (5.0 ttl pk-yrs)     Types: Cigarettes     Start date: 3/25/2007     Quit date: 3/25/2017     Years since quittin.8    Smokeless tobacco: Never   Substance and Sexual Activity    Alcohol use: Not Currently     Alcohol/week: 1.0 standard drink of alcohol     Types: 1 Shots of liquor per week    Drug use: No    Sexual activity: Yes     Partners: Female     Review of Systems   Constitutional:  Positive for fatigue.  Negative for fever.   Genitourinary:  Positive for dysuria.   Musculoskeletal:  Positive for back pain.     Objective:     Vital Signs (Most Recent):  Temp: 98.4 °F (36.9 °C) (02/05/24 0041)  Pulse: 79 (02/05/24 0041)  Resp: 20 (02/05/24 0041)  BP: (!) 98/51 (02/05/24 0041)  SpO2: 100 % (02/05/24 0041) Vital Signs (24h Range):  Temp:  [98.4 °F (36.9 °C)-108 °F (42.2 °C)] 98.4 °F (36.9 °C)  Pulse:  [] 79  Resp:  [20] 20  SpO2:  [96 %-100 %] 100 %  BP: ()/(40-51) 98/51        There is no height or weight on file to calculate BMI.     Physical Exam  HENT:      Mouth/Throat:      Comments: Hoarseness noted   Cardiovascular:      Rate and Rhythm: Normal rate.   Pulmonary:      Effort: Pulmonary effort is normal. No respiratory distress.   Abdominal:      Comments: No significant tenderness noted on nursing palpation but reportedly a bit distended    Musculoskeletal:      Right lower leg: Edema (trace) present.      Left lower leg: Edema (trace) present.   Neurological:      Mental Status: She is alert and oriented to person, place, and time.                Significant Labs:  CBC, CMP, UA reviewed from [facility] Records     Significant Imaging:  CT and CXR reviewed from [facility]

## 2024-02-05 NOTE — H&P
Adelso tatyana - Oncology ([facility])  [facility] Medicine  History & Physical    Patient Name: Sabrina Mac  MRN: [id number]  Admission Date: 2/5/2024  Attending Physician: Robert Elmore MD   Primary Care Provider: Earnestine Primary Doctor         Patient information was obtained from patient and ER records.       Subjective:     Principal Problem:Neutropenic fever    Chief Complaint: No chief complaint on file.       HPI: The patient is a 72 y/o female with PMH of HTN, GERD, depression, and AML whop presents as a transfer from [facility] for neutropenic fever. She presented there for back pain and recurrent UTI. She had been treated with a 5 day course of an antibiotics but then symptoms of dysuria started to recur and she also started with the back pain. She was worried about a kidney stone. On 1/7 she was seen in the ER and diagnosed with UTI and started on cefdenir. Temp in the ER was 99. Work up showed UTI and she was given a dose of ceftriaxone and cefepime.  requested transfer to [facility] since her oncology care is here. She reports continue pain in the back. CT abd was done which did not show any acute findings except for non-obstructing L renal stone and constipation. She also reports new hoarseness.       Past Medical History:   Diagnosis Date    Cancer 03/2018    AML    CHF (congestive heart failure)     Diarrhea 9/14/2018    Encounter for blood transfusion        Past Surgical History:   Procedure Laterality Date    BONE MARROW BIOPSY Left 12/27/2018    Procedure: Biopsy-bone marrow;  Surgeon: Laquiat Troy MD;  [facility];  Service: Orthopedics;  Laterality: Left;    BONE MARROW BIOPSY Left 5/9/2019    Procedure: Biopsy-bone marrow;  Surgeon: Laquita Troy MD;  Location: [facility] OR [facility];  Service: Orthopedics;  Laterality: Left;    BONE MARROW BIOPSY N/A 12/13/2023    Procedure: Biopsy-bone marrow;  Surgeon: Laquita Troy MD;  Location: [facility] (4TH FLR);  Service:  Orthopedics;  Laterality: N/A;    CHOLECYSTECTOMY      HEMIARTHROPLASTY OF HIP Right 2/1/2021    Procedure: HEMIARTHROPLASTY, HIP (right) - jemima Castillo;  Surgeon: Hector Miller MD;  Location: [facility] OR [address] [place] [address];  Service: Orthopedics;  Laterality: Right;    HYSTERECTOMY      TONSILLECTOMY         Review of patient's allergies indicates:   Allergen Reactions    Methotrexate analogues      Elevated Liver Enzyme    Sulfa (sulfonamide antibiotics)      Rash, nausea, vomiting    Bactrim [sulfamethoxazole-trimethoprim] Nausea And Vomiting and Rash       No current facility-administered medications on file prior to encounter.     Current Outpatient Medications on File Prior to Encounter   Medication Sig    acyclovir 5% (ZOVIRAX) 5 % ointment Apply topically every 3 (three) hours. (Patient not taking: Reported on 2/1/2024)    ALPRAZolam (XANAX) 2 MG Tab TAKE ONE TABLET BY MOUTH NIGHTLY AS NEEDED    BANOPHEN 50 mg capsule TAKE ONE CAPSULE BY MOUTH NIGHTLY AS NEEDED FOR ITCHING OR INSOMNIA    EScitalopram oxalate (LEXAPRO) 20 MG tablet TAKE ONE TABLET BY MOUTH EACH EVENING    methocarbamoL (ROBAXIN) 500 MG Tab Take 1 tablet (500 mg total) by mouth 4 (four) times daily.    metoprolol succinate (TOPROL-XL) 25 MG 24 hr tablet Take 1 tablet (25 mg total) by mouth once daily.    multivit-minerals/folic acid (MULTIVITAMIN GUMMIES ORAL) Take by mouth.    nitrofurantoin, macrocrystal-monohydrate, (MACROBID) 100 MG capsule Take 1 capsule (100 mg total) by mouth 2 (two) times daily.    omeprazole (PRILOSEC) 20 MG capsule Take 1 capsule (20 mg total) by mouth 2 (two) times daily before meals.    ondansetron (ZOFRAN) 8 MG tablet Take 1 tablet (8 mg total) by mouth every 12 (twelve) hours as needed for Nausea.    ondansetron (ZOFRAN-ODT) 8 MG TbDL Take 8 mg by mouth 3 (three) times daily.    oxyCODONE (ROXICODONE) 10 mg Tab immediate release tablet Take 1 tablet (10 mg total) by mouth every 6 (six) hours as needed (pain).     oxyCODONE (ROXICODONE) 10 mg Tab immediate release tablet Take 1 tablet (10 mg total) by mouth every 4 (four) hours as needed for Pain.    oxyCODONE (ROXICODONE) 20 mg Tab immediate release tablet Take 1 tablet (20 mg total) by mouth every 6 (six) hours as needed (pain).    predniSONE (DELTASONE) 20 MG tablet TAKE ONE TABLET BY MOUTH ONCE DAILY AS NEEDED    predniSONE (DELTASONE) 20 MG tablet Take 1 tablet (20 mg total) by mouth once daily.    tiZANidine (ZANAFLEX) 4 MG tablet Take 1 tablet (4 mg total) by mouth 3 (three) times daily.    venetoclax (VENCLEXTA) 100 mg Tab Take 400 mg (4 tablets) by mouth once daily.     Family History       Problem Relation (Age of Onset)    Cancer Mother, Father          Tobacco Use    Smoking status: Former     Current packs/day: 0.00     Average packs/day: 0.5 packs/day for 10.0 years (5.0 ttl pk-yrs)     Types: Cigarettes     Start date: 3/25/2007     Quit date: 3/25/2017     Years since quittin.8    Smokeless tobacco: Never   Substance and Sexual Activity    Alcohol use: Not Currently     Alcohol/week: 1.0 standard drink of alcohol     Types: 1 Shots of liquor per week    Drug use: No    Sexual activity: Yes     Partners: Female     Review of Systems   Constitutional:  Positive for fatigue. Negative for fever.   Genitourinary:  Positive for dysuria.   Musculoskeletal:  Positive for back pain.     Objective:     Vital Signs (Most Recent):  Temp: 98.4 °F (36.9 °C) (24)  Pulse: 79 (24)  Resp: 20 (24)  BP: (!) 98/51 (24)  SpO2: 100 % (24) Vital Signs (24h Range):  Temp:  [98.4 °F (36.9 °C)-108 °F (42.2 °C)] 98.4 °F (36.9 °C)  Pulse:  [] 79  Resp:  [20] 20  SpO2:  [96 %-100 %] 100 %  BP: ()/(40-51) 98/51        There is no height or weight on file to calculate BMI.     Physical Exam  HENT:      Mouth/Throat:      Comments: Hoarseness noted   Cardiovascular:      Rate and Rhythm: Normal rate.   Pulmonary:       Effort: Pulmonary effort is normal. No respiratory distress.   Abdominal:      Comments: No significant tenderness noted on nursing palpation but reportedly a bit distended    Musculoskeletal:      Right lower leg: Edema (trace) present.      Left lower leg: Edema (trace) present.   Neurological:      Mental Status: She is alert and oriented to person, place, and time.                Significant Labs:  CBC, CMP, UA reviewed from [facility] Records     Significant Imaging:  CT and CXR reviewed from [facility]   Assessment/Plan:     * Neutropenic fever  Transferred for [facility] per family request  Tmax at [facility] 99  Afebrile on arrival  Neutropenic precautions  Continue cefepime for UTI   Labs pending       Constipation  Last bowel movement 4 days ago   CT abd at [facility] noted constipation   Start lactulose       UTI (urinary tract infection)  Management per neutropenic fever       AML (acute myeloblastic leukemia)  Management per oncology   Continue venetoclax and prednisone       Back pain  Lidocaine patch   Non-obstructing kidney stone noted on CT from [facility]   Continue oxycodone prn       Depression with anxiety  Patient has  unknown  depression which is unknown and is currently controlled. Will Continue anti-depressant medications. We will not consult psychiatry at this time. Patient does not display psychosis at this time. Continue to monitor closely and adjust plan of care as needed.          VTE Risk Mitigation (From admission, onward)           Ordered     Reason for No Pharmacological VTE Prophylaxis  Once        Question:  Reasons:  Answer:  Thrombocytopenia    02/05/24 0153                         The attending portion of this evaluation, treatment, and documentation was performed per Colette Lanier MD via Telemedicine AudioVisual using the secure [employer] software platform with 2 way audio/video. The provider was located off-site and the patient is  located in the hospital. The aforementioned video software was utilized to document the relevant history and physical exam            Colette Lanier MD  Department of Hospital Medicine   [address] - Oncology ([facility])

## 2024-02-05 NOTE — CONSULTS
NIAS consulted for Midline insertion in real time using u/s guidance.     Indication: LONG TERM PVA  Gauge: 18  Location: RIGHT BASILIC  Length in cm: 10  Max dwell date: 3/5/2024  Lot #: [id number]    Image saved and uploaded to EMR

## 2024-02-05 NOTE — ASSESSMENT & PLAN NOTE
- Transfuse 1 unit of PRBC for hgb <7 or symptomatic.   - Transfuse 1 unit of platelets for platelets <10 or symptomatic.   - 2 units of blood and 1 unit of platelets ordered on 2/5.

## 2024-02-05 NOTE — ASSESSMENT & PLAN NOTE
- Diagnosed in 2018. S/P 7+3+mido. FLAG-JENNIFER reinduction. HiDAC x 3 consolidation. Transitioned to observation. Increase in blast of 11% on bone marrow in 2020. Received 4 cycles of venclexta and dacogen with response. 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 Began aza x 5 days, kirby 400mg x 28 days on 1/16/24.   - Chemotherapy on hold while admitted.   - Will resume azacitidine x 5 days and venclexta 100mg days 1-14 of a 28 day cycle. Venclexta dose reduced due to interaction with posaconazole. With increase or ongoing cytopenias, venclexta dose may be decreased to 50mg. Venclexta cycle decreased to help with cytopenias.   - Will need to discuss when to repeat bone marrow biopsy to evaluate for disease response.

## 2024-02-05 NOTE — PROGRESS NOTES
Pharmacist Renal Dose Adjustment Note    Sabrina Mac is a 73 y.o. female being treated with the medication cefepime    Patient Data:    Vital Signs (Most Recent):  Temp: 98.4 °F (36.9 °C) (02/05/24 0041)  Pulse: 79 (02/05/24 0041)  Resp: 20 (02/05/24 0041)  BP: (!) 98/51 (02/05/24 0041)  SpO2: 100 % (02/05/24 0041) Vital Signs (72h Range):  Temp:  [98.4 °F (36.9 °C)-108 °F (42.2 °C)]   Pulse:  []   Resp:  [20]   BP: ()/(40-51)   SpO2:  [96 %-100 %]      Recent Labs   Lab 02/01/24  1233   CREATININE 1.0     Serum creatinine: 1 mg/dL 02/01/24 1233  Estimated creatinine clearance: 55.8 mL/min    Medication:cefepime dose: 2g frequency q8h will be changed to medication:cefepime dose:2g frequency:q12h    Pharmacist's Name: Alvaro Mauro  Pharmacist's Extension: [id number]

## 2024-02-05 NOTE — SUBJECTIVE & OBJECTIVE
Subjective:     Interval History: Dr. Troy patient with AML on aza/ kirby. Chemotherapy on hold while admitted in the setting of infection.Transferred from [facility] for neutropenic fever. Patient previously completed antibiotics for UTI. Had recurrence of symptoms with new back pain. Presented to ER. Lactic acid was elevated at 2.7, resulted at 1.3 today. Chest x-ray without acute process. CT abdomen with L non-obstructive kidney stone. Patient received ceftriaxone and cefepime. Repeat urinalysis with occult blood and trace leukocytes, nitrates negative. Urine culture pending. Is continuing cefepime at [facility]. Blood cultures completed on 2/5. Patient experiencing new vocal hoarseness. Respiratory viral panel ordered. Oxygen via nasal cannula being administered. 2 units of blood and 1 unit of platelets ordered today.      Objective:     Vital Signs (Most Recent):  Temp: 98.1 °F (36.7 °C) (02/05/24 1342)  Pulse: 87 (02/05/24 1342)  Resp: 18 (02/05/24 1342)  BP: 130/61 (02/05/24 1342)  SpO2: 97 % (02/05/24 1342) Vital Signs (24h Range):  Temp:  [97.9 °F (36.6 °C)-108 °F (42.2 °C)] 98.1 °F (36.7 °C)  Pulse:  [] 87  Resp:  [16-20] 18  SpO2:  [96 %-100 %] 97 %  BP: ()/(40-76) 130/61        There is no height or weight on file to calculate BMI.  There is no height or weight on file to calculate BSA.      Intake/Output - Last 3 Shifts         02/03 0700  02/04 0659 02/04 0700  02/05 0659 02/05 0700  02/06 0659    P.O.  500     Blood   350    Total Intake  500 350    Urine  200     Total Output  200     Net  +300 +350           Urine Occurrence  1 x     Stool Occurrence   1 x             Physical Exam  Eyes:      Extraocular Movements: Extraocular movements intact.      Conjunctiva/sclera: Conjunctivae normal.   Cardiovascular:      Rate and Rhythm: Normal rate and regular rhythm.      Heart sounds: Normal heart sounds.   Abdominal:      General: Bowel sounds are normal. There is no distension.       Palpations: Abdomen is soft.      Tenderness: There is abdominal tenderness. There is no guarding.   Musculoskeletal:      Right lower leg: No edema.      Left lower leg: No edema.   Skin:     Findings: Bruising present.   Neurological:      Mental Status: She is alert and oriented to person, place, and time.      Motor: Weakness present.   Psychiatric:         Behavior: Behavior normal.            Significant Labs:   CBC:   Recent Labs   Lab 02/04/24  1513 02/05/24  0437   WBC 0.3* 0.33*   HGB 7.3* 5.3*   HCT 23.4* 17.7*   PLT 14* 6*    and CMP:   Recent Labs   Lab 02/05/24  0437   *   K 4.6      CO2 20*      BUN 30*   CREATININE 1.4   CALCIUM 8.1*   PROT 4.6*   ALBUMIN 2.4*   BILITOT 3.3*   ALKPHOS 153*   AST 82*   *   ANIONGAP 7*       Diagnostic Results:  I have reviewed all pertinent imaging results/findings within the past 24 hours.

## 2024-02-05 NOTE — HPI
The patient is a 72 y/o female with PMH of HTN, GERD, depression, and AML whop presents as a transfer from [facility] for neutropenic fever. She presented there for back pain and recurrent UTI. She had been treated with a 5 day course of an antibiotics but then symptoms of dysuria started to recur and she also started with the back pain. She was worried about a kidney stone. On 1/7 she was seen in the ER and diagnosed with UTI and started on cefdenir. Temp in the ER was 99. Work up showed UTI and she was given a dose of ceftriaxone and cefepime.  requested transfer to [facility] since her oncology care is here. She reports continue pain in the back. CT abd was done which did not show any acute findings except for non-obstructing L renal stone and constipation. She also reports new hoarseness.

## 2024-02-05 NOTE — PLAN OF CARE
0024 rec'd to room 856. VSS, on 2L NC. Orientedx4. Purewick applied, no skin breakdown noted. Denies back pain at this time, also no complaints of nausea. Oriented to room and POC, all questions answered. Bed low and wheels locked. Call bell near, enc to call for any needs/assistance.     0533 Yolande LANDA notified of several morning criticals, type and screen order.     also called to bring home medications.     0621 c/o SOB. Repositioned, HOB elevated, and NC reapplied. O2 100%, in no distress. Reports improvement in breathing. RT called for PRN breathing tx.

## 2024-02-05 NOTE — ASSESSMENT & PLAN NOTE
Lidocaine patch   Non-obstructing kidney stone noted on CT from [facility]   Continue oxycodone prn

## 2024-02-05 NOTE — ASSESSMENT & PLAN NOTE
- Lidocaine patch ordered   - Oxycodone ordered prn   - Non-obstructing kidney stone noted on CT from [facility]

## 2024-02-05 NOTE — ASSESSMENT & PLAN NOTE
- Transferred for [facility] per family request  - Tmax at [facility] 99  - Afebrile since arrival to [facility]  - Continue cefepime for UTI pending ongoing infectious work up  - Lactic acid repeated with decrease from 2.7 to 1.3   - Blood cultures x 2 ordered on 2/5/24  - Respiratory viral panel ordered on 2/5/24- results pending   - Lactated ringers 100ml/hr for blood pressure and kidney support.

## 2024-02-05 NOTE — HOSPITAL COURSE
02/05/2024 Dr. Troy patient with AML on aza/ kirby. Chemo held while admitted.Transferred from [facility] for neutropenic fever. Patient previously completed abx for UTI. Had recurrence of symptoms with new back pain. Presented to ER. Lactic acid was elevated at 2.7, resulted at 1.3 today. Chest x-ray without acute process. CT abdomen with L non-obstructive kidney stone. Patient received ceftriaxone and cefepime. Repeat urinalysis with occult blood and trace leukocytes, nitrates negative. Urine culture pending. Is continuing cefepime at [facility]. Blood cultures completed on 2/5. Patient experiencing new vocal hoarseness. Respiratory viral panel ordered. Oxygen via nasal cannula being administered. Kidney function elevated compared to patient baseline with BUN 30, creatinine 1.4. Blood pressures running on the lower end. 2 units of blood and 1 unit of platelets ordered today.   02/06/2024 Patient of Dr. Troy with AML. Admitted for neutropenic fever. On cefepime for 2 days. D/c cefepime today to transition to levaquin 750mg then to decrease to 500mg prophylaxis dose. Remdesivir for COVID+19 started on 2/5. Afebrile since transfer on 2/5. Continuing acyclovir and posaconazole. On prednisone for rheumatologic arthritis. Patient would like to discontinue chemotherapy and transition to supportive care.   02/07/2024 AML patient s/p cycle 1 aza/kirby admitted with covid-19 and neutropenic fever. Remains cytopenic. 1 unit of platelets administered today. Blood cultures NGTD. Urine culture with no growth. Transitioned antibiotics to oral levauqin. Patient would like to transition to supportive care measures. Patient  not ready to discharge home. Patient is ready to go home, but will need assistive devices and home health. Ordered PT/OT evaluation while admitted.

## 2024-02-05 NOTE — ASSESSMENT & PLAN NOTE
- CT abd at [facility] noted constipation   - Start lactulose upon arrival. 2 bowel movements on 2/5. Transitioned to maintenance senna daily on 2/6.

## 2024-02-06 DIAGNOSIS — C92.02 ACUTE MYELOID LEUKEMIA IN RELAPSE: Primary | ICD-10-CM

## 2024-02-06 PROBLEM — L29.9 ITCHING: Status: ACTIVE | Noted: 2024-02-06

## 2024-02-06 LAB
ALBUMIN SERPL BCP-MCNC: 2.4 G/DL (ref 3.5–5.2)
ALP SERPL-CCNC: 142 U/L (ref 55–135)
ALT SERPL W/O P-5'-P-CCNC: 113 U/L (ref 10–44)
ANION GAP SERPL CALC-SCNC: 7 MMOL/L (ref 8–16)
AST SERPL-CCNC: 48 U/L (ref 10–40)
BACTERIA UR CULT: NORMAL
BASOPHILS # BLD AUTO: 0 K/UL (ref 0–0.2)
BASOPHILS NFR BLD: 0 % (ref 0–1.9)
BILIRUB SERPL-MCNC: 1.9 MG/DL (ref 0.1–1)
BUN SERPL-MCNC: 27 MG/DL (ref 8–23)
CALCIUM SERPL-MCNC: 9 MG/DL (ref 8.7–10.5)
CHLORIDE SERPL-SCNC: 108 MMOL/L (ref 95–110)
CO2 SERPL-SCNC: 22 MMOL/L (ref 23–29)
CREAT SERPL-MCNC: 1.2 MG/DL (ref 0.5–1.4)
DIFFERENTIAL METHOD BLD: ABNORMAL
EOSINOPHIL # BLD AUTO: 0 K/UL (ref 0–0.5)
EOSINOPHIL NFR BLD: 0 % (ref 0–8)
ERYTHROCYTE [DISTWIDTH] IN BLOOD BY AUTOMATED COUNT: 25.6 % (ref 11.5–14.5)
EST. GFR  (NO RACE VARIABLE): 48.1 ML/MIN/1.73 M^2
GLUCOSE SERPL-MCNC: 95 MG/DL (ref 70–110)
HCT VFR BLD AUTO: 26.6 % (ref 37–48.5)
HGB BLD-MCNC: 8.4 G/DL (ref 12–16)
IMM GRANULOCYTES # BLD AUTO: 0 K/UL (ref 0–0.04)
IMM GRANULOCYTES NFR BLD AUTO: 0 % (ref 0–0.5)
LYMPHOCYTES # BLD AUTO: 0.2 K/UL (ref 1–4.8)
LYMPHOCYTES NFR BLD: 96 % (ref 18–48)
MAGNESIUM SERPL-MCNC: 1.7 MG/DL (ref 1.6–2.6)
MCH RBC QN AUTO: 27.8 PG (ref 27–31)
MCHC RBC AUTO-ENTMCNC: 31.6 G/DL (ref 32–36)
MCV RBC AUTO: 88 FL (ref 82–98)
MONOCYTES # BLD AUTO: 0 K/UL (ref 0.3–1)
MONOCYTES NFR BLD: 4 % (ref 4–15)
NEUTROPHILS # BLD AUTO: 0 K/UL (ref 1.8–7.7)
NEUTROPHILS NFR BLD: 0 % (ref 38–73)
NRBC BLD-RTO: 8 /100 WBC
PHOSPHATE SERPL-MCNC: 3.2 MG/DL (ref 2.7–4.5)
PLATELET # BLD AUTO: 7 K/UL (ref 150–450)
PLATELET BLD QL SMEAR: ABNORMAL
PMV BLD AUTO: ABNORMAL FL (ref 9.2–12.9)
POTASSIUM SERPL-SCNC: 4.3 MMOL/L (ref 3.5–5.1)
PROT SERPL-MCNC: 5.1 G/DL (ref 6–8.4)
RBC # BLD AUTO: 3.02 M/UL (ref 4–5.4)
SODIUM SERPL-SCNC: 137 MMOL/L (ref 136–145)
WBC # BLD AUTO: 0.25 K/UL (ref 3.9–12.7)

## 2024-02-06 PROCEDURE — 63600175 PHARM REV CODE 636 W HCPCS

## 2024-02-06 PROCEDURE — A4216 STERILE WATER/SALINE, 10 ML: HCPCS | Performed by: INTERNAL MEDICINE

## 2024-02-06 PROCEDURE — 36415 COLL VENOUS BLD VENIPUNCTURE: CPT

## 2024-02-06 PROCEDURE — 25000003 PHARM REV CODE 250

## 2024-02-06 PROCEDURE — 83735 ASSAY OF MAGNESIUM: CPT

## 2024-02-06 PROCEDURE — 27000207 HC ISOLATION

## 2024-02-06 PROCEDURE — 25000003 PHARM REV CODE 250: Performed by: INTERNAL MEDICINE

## 2024-02-06 PROCEDURE — 84100 ASSAY OF PHOSPHORUS: CPT

## 2024-02-06 PROCEDURE — 63600175 PHARM REV CODE 636 W HCPCS: Performed by: HOSPITALIST

## 2024-02-06 PROCEDURE — 80053 COMPREHEN METABOLIC PANEL: CPT

## 2024-02-06 PROCEDURE — 20600001 HC STEP DOWN PRIVATE ROOM

## 2024-02-06 PROCEDURE — 99232 SBSQ HOSP IP/OBS MODERATE 35: CPT | Mod: ,,, | Performed by: INTERNAL MEDICINE

## 2024-02-06 PROCEDURE — 38241 TRANSPLT AUTOL HCT/DONOR: CPT

## 2024-02-06 PROCEDURE — 85025 COMPLETE CBC W/AUTO DIFF WBC: CPT

## 2024-02-06 PROCEDURE — 25000003 PHARM REV CODE 250: Performed by: HOSPITALIST

## 2024-02-06 PROCEDURE — 63600175 PHARM REV CODE 636 W HCPCS: Mod: JZ,TB | Performed by: INTERNAL MEDICINE

## 2024-02-06 RX ORDER — CETIRIZINE HYDROCHLORIDE 10 MG/1
10 TABLET ORAL DAILY
Status: DISCONTINUED | OUTPATIENT
Start: 2024-02-06 | End: 2024-02-08 | Stop reason: HOSPADM

## 2024-02-06 RX ORDER — HYDROCODONE BITARTRATE AND ACETAMINOPHEN 500; 5 MG/1; MG/1
TABLET ORAL
Status: DISCONTINUED | OUTPATIENT
Start: 2024-02-06 | End: 2024-02-08 | Stop reason: HOSPADM

## 2024-02-06 RX ORDER — LEVOFLOXACIN 750 MG/1
750 TABLET ORAL DAILY
Status: DISCONTINUED | OUTPATIENT
Start: 2024-02-07 | End: 2024-02-08 | Stop reason: HOSPADM

## 2024-02-06 RX ADMIN — SODIUM CHLORIDE, POTASSIUM CHLORIDE, SODIUM LACTATE AND CALCIUM CHLORIDE: 600; 310; 30; 20 INJECTION, SOLUTION INTRAVENOUS at 04:02

## 2024-02-06 RX ADMIN — LORAZEPAM 2 MG: 1 TABLET ORAL at 08:02

## 2024-02-06 RX ADMIN — METOPROLOL SUCCINATE 25 MG: 25 TABLET, EXTENDED RELEASE ORAL at 08:02

## 2024-02-06 RX ADMIN — CEFEPIME 2 G: 2 INJECTION, POWDER, FOR SOLUTION INTRAVENOUS at 05:02

## 2024-02-06 RX ADMIN — CEFEPIME 2 G: 2 INJECTION, POWDER, FOR SOLUTION INTRAVENOUS at 03:02

## 2024-02-06 RX ADMIN — ACYCLOVIR 400 MG: 200 CAPSULE ORAL at 08:02

## 2024-02-06 RX ADMIN — SENNOSIDES 8.6 MG: 8.6 TABLET, FILM COATED ORAL at 08:02

## 2024-02-06 RX ADMIN — REMDESIVIR 100 MG: 100 INJECTION, POWDER, LYOPHILIZED, FOR SOLUTION INTRAVENOUS at 08:02

## 2024-02-06 RX ADMIN — PANTOPRAZOLE SODIUM 40 MG: 40 TABLET, DELAYED RELEASE ORAL at 08:02

## 2024-02-06 RX ADMIN — Medication 10 ML: at 11:02

## 2024-02-06 RX ADMIN — PREDNISONE 20 MG: 20 TABLET ORAL at 08:02

## 2024-02-06 RX ADMIN — Medication 10 ML: at 05:02

## 2024-02-06 RX ADMIN — OXYCODONE HYDROCHLORIDE 10 MG: 10 TABLET ORAL at 08:02

## 2024-02-06 RX ADMIN — ESCITALOPRAM OXALATE 20 MG: 20 TABLET, FILM COATED ORAL at 08:02

## 2024-02-06 RX ADMIN — LIDOCAINE 1 PATCH: 700 PATCH TOPICAL at 05:02

## 2024-02-06 RX ADMIN — POSACONAZOLE 300 MG: 100 TABLET, DELAYED RELEASE ORAL at 08:02

## 2024-02-06 NOTE — ASSESSMENT & PLAN NOTE
- Transfuse 1 unit of PRBC for hgb <7 or symptomatic.   - Transfuse 1 unit of platelets for platelets <10 or symptomatic.   -1 unit of platelets ordered on 2/6.

## 2024-02-06 NOTE — SUBJECTIVE & OBJECTIVE
Subjective:     Interval History: Patient of Dr. Troy with AML. Admitted for neutropenic fever. Urine culture negative, Blood cultures NGTD. On cefepime for 2 days. Will discontinue cefepime today to transition to levaquin 750mg then to decrease to 500mg prophylaxis dose. Remdesivir for COVID+19 started on 2/5. Afebrile since transfer on 2/5. Continuing acyclovir and posaconazole prophylaxis. On prednisone for rheumatologic arthritis. Patient would like to discontinue chemotherapy and transition to supportive care. New onset itching. Cetrizine ordered.     Objective:     Vital Signs (Most Recent):  Temp: 97.8 °F (36.6 °C) (02/06/24 1524)  Pulse: 79 (02/06/24 1524)  Resp: 18 (02/06/24 1524)  BP: (!) 145/78 (02/06/24 1524)  SpO2: (!) 94 % (02/06/24 1524) Vital Signs (24h Range):  Temp:  [97.4 °F (36.3 °C)-99.2 °F (37.3 °C)] 97.8 °F (36.6 °C)  Pulse:  [76-83] 79  Resp:  [16-21] 18  SpO2:  [92 %-99 %] 94 %  BP: (118-164)/(52-84) 145/78        There is no height or weight on file to calculate BMI.  There is no height or weight on file to calculate BSA.    Intake/Output - Last 3 Shifts         02/04 0700  02/05 0659 02/05 0700  02/06 0659 02/06 0700  02/07 0659    P.O. 500      Blood  1061.3     Total Intake 500 1061.3     Urine 200 1550 350    Stool  0     Total Output 200 1550 350    Net +300 -488.8 -350           Urine Occurrence 1 x 1 x     Stool Occurrence  2 x              Physical Exam  Constitutional:       General: She is not in acute distress.  HENT:      Mouth/Throat:      Comments: Voice is hoarse   Eyes:      Extraocular Movements: Extraocular movements intact.      Conjunctiva/sclera: Conjunctivae normal.   Cardiovascular:      Rate and Rhythm: Normal rate and regular rhythm.      Heart sounds: Normal heart sounds.   Pulmonary:      Effort: Pulmonary effort is normal. No respiratory distress.      Breath sounds: Normal breath sounds.   Abdominal:      General: Bowel sounds are normal. There is no  distension.      Palpations: Abdomen is soft.      Tenderness: There is no abdominal tenderness. There is no guarding.   Musculoskeletal:      Right lower leg: No edema.      Left lower leg: No edema.   Skin:     Findings: Bruising present. No rash.   Neurological:      Mental Status: She is alert and oriented to person, place, and time.      Motor: Weakness present.   Psychiatric:         Mood and Affect: Mood normal.         Behavior: Behavior normal.            Significant Labs:   CBC:   Recent Labs   Lab 02/05/24  0437 02/06/24  0507   WBC 0.33* 0.25*   HGB 5.3* 8.4*   HCT 17.7* 26.6*   PLT 6* 7*    and CMP:   Recent Labs   Lab 02/05/24  0437 02/06/24  0507   * 137   K 4.6 4.3    108   CO2 20* 22*    95   BUN 30* 27*   CREATININE 1.4 1.2   CALCIUM 8.1* 9.0   PROT 4.6* 5.1*   ALBUMIN 2.4* 2.4*   BILITOT 3.3* 1.9*   ALKPHOS 153* 142*   AST 82* 48*   * 113*   ANIONGAP 7* 7*       Diagnostic Results:  None

## 2024-02-06 NOTE — ASSESSMENT & PLAN NOTE
- Transferred for [facility] per family request  - Afebrile since arrival to [facility]  - Lactic acid repeated with decrease from 2.7 to 1.3   - Blood cultures x 2 ordered on 2/5/24- NGTD  - Urine culture- negative   - Respiratory viral panel ordered on 2/5/24- COVID +19  - Lactated ringers 50 ml/hr for blood pressure and kidney support.   - Will transition for cefepime after 2 days to levauqin 750mg for 5 days then reduce dose to 500mg levauqin daily for prophylaxis.

## 2024-02-06 NOTE — ASSESSMENT & PLAN NOTE
- CT abd at [facility] noted constipation   - Start lactulose upon arrival. 2 bowel movements on 2/5. Transitioned to maintenance senna daily on 2/6.

## 2024-02-06 NOTE — PROGRESS NOTES
[facility] - Telemetry Stepdown  Hematology  Bone Marrow Transplant  Progress Note    Patient Name: Sabrina Mac  Admission Date: 2/5/2024  Hospital Length of Stay: 1 days  Code Status: Full Code    Subjective:     Interval History: Patient of Dr. Troy with AML. Admitted for neutropenic fever. Urine culture negative, Blood cultures NGTD. On cefepime for 2 days. Will discontinue cefepime today to transition to levaquin 750mg then to decrease to 500mg prophylaxis dose. Remdesivir for COVID+19 started on 2/5. Afebrile since transfer on 2/5. Continuing acyclovir and posaconazole prophylaxis. On prednisone for rheumatologic arthritis. Patient would like to discontinue chemotherapy and transition to supportive care. New onset itching. Cetrizine ordered.     Objective:     Vital Signs (Most Recent):  Temp: 97.8 °F (36.6 °C) (02/06/24 1524)  Pulse: 79 (02/06/24 1524)  Resp: 18 (02/06/24 1524)  BP: (!) 145/78 (02/06/24 1524)  SpO2: (!) 94 % (02/06/24 1524) Vital Signs (24h Range):  Temp:  [97.4 °F (36.3 °C)-99.2 °F (37.3 °C)] 97.8 °F (36.6 °C)  Pulse:  [76-83] 79  Resp:  [16-21] 18  SpO2:  [92 %-99 %] 94 %  BP: (118-164)/(52-84) 145/78        There is no height or weight on file to calculate BMI.  There is no height or weight on file to calculate BSA.    Intake/Output - Last 3 Shifts         02/04 0700  02/05 0659 02/05 0700  02/06 0659 02/06 0700  02/07 0659    P.O. 500      Blood  1061.3     Total Intake 500 1061.3     Urine 200 1550 350    Stool  0     Total Output 200 1550 350    Net +300 -488.8 -350           Urine Occurrence 1 x 1 x     Stool Occurrence  2 x              Physical Exam  Constitutional:       General: She is not in acute distress.  HENT:      Mouth/Throat:      Comments: Voice is hoarse   Eyes:      Extraocular Movements: Extraocular movements intact.      Conjunctiva/sclera: Conjunctivae normal.   Cardiovascular:      Rate and Rhythm: Normal rate and regular rhythm.      Heart sounds: Normal heart  sounds.   Pulmonary:      Effort: Pulmonary effort is normal. No respiratory distress.      Breath sounds: Normal breath sounds.   Abdominal:      General: Bowel sounds are normal. There is no distension.      Palpations: Abdomen is soft.      Tenderness: There is no abdominal tenderness. There is no guarding.   Musculoskeletal:      Right lower leg: No edema.      Left lower leg: No edema.   Skin:     Findings: Bruising present. No rash.   Neurological:      Mental Status: She is alert and oriented to person, place, and time.      Motor: Weakness present.   Psychiatric:         Mood and Affect: Mood normal.         Behavior: Behavior normal.            Significant Labs:   CBC:   Recent Labs   Lab 02/05/24  0437 02/06/24  0507   WBC 0.33* 0.25*   HGB 5.3* 8.4*   HCT 17.7* 26.6*   PLT 6* 7*    and CMP:   Recent Labs   Lab 02/05/24  0437 02/06/24  0507   * 137   K 4.6 4.3    108   CO2 20* 22*    95   BUN 30* 27*   CREATININE 1.4 1.2   CALCIUM 8.1* 9.0   PROT 4.6* 5.1*   ALBUMIN 2.4* 2.4*   BILITOT 3.3* 1.9*   ALKPHOS 153* 142*   AST 82* 48*   * 113*   ANIONGAP 7* 7*       Diagnostic Results:  None  Assessment/Plan:     * Neutropenic fever  - Transferred for [facility] per family request  - Afebrile since arrival to [facility]  - Lactic acid repeated with decrease from 2.7 to 1.3   - Blood cultures x 2 ordered on 2/5/24- NGTD  - Urine culture- negative   - Respiratory viral panel ordered on 2/5/24- COVID +19  - Lactated ringers 50 ml/hr for blood pressure and kidney support.   - Will transition for cefepime after 2 days to levauqin 750mg for 5 days then reduce dose to 500mg levauqin daily for prophylaxis.        Itching  - Cetrizine ordered     Voice hoarseness  - Chest x-ray without acute process.   - respiratory viral panel on 2/5/24 revealing COVID-19    Constipation  - CT abd at [facility] noted constipation   - Start lactulose upon arrival. 2 bowel movements on 2/5. Transitioned to  maintenance senna daily on 2/6.     UTI (urinary tract infection)  - Management per neutropenic fever   - Urine culture negative on 2/6    Immunodeficiency due to conditions classified elsewhere  - Started on acyclovir 400mg BID  - Started posaconazole 300mg daily. 300mg BID administered as starting dose on 2/5.   - Will transition to Levaquin 500mg orally once completed 5 days of Levaquin 750mg     Pancytopenia  - Transfuse 1 unit of PRBC for hgb <7 or symptomatic.   - Transfuse 1 unit of platelets for platelets <10 or symptomatic.   -1 unit of platelets ordered on 2/6.       AML (acute myeloblastic leukemia)  - Diagnosed in 2018. S/P 7+3+mido. FLAG-JENNIFER reinduction. HiDAC x 3 consolidation. Transitioned to observation. Increase in blast of 11% on bone marrow in 2020. Received 4 cycles of venclexta and dacogen with response. 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 Began aza x 5 days, kirby 400mg x 28 days on 1/16/24.   - Chemotherapy on hold while admitted.   -Patient would like to discontinue chemotherapy and transition to supportive care with transfusions as needed  - Weekly labs at [facility] ordered. Follow up with Dr. Troy scheduled on 2/22/24      Back pain  - Lidocaine patch ordered   - Oxycodone ordered prn   - Non-obstructing kidney stone noted on CT from [facility]       Depression with anxiety  - continue home escitalpram nightly   - Switched prn xanax to prn ativan due to medication interaction with posaconazole and xanax.     Transaminitis   - Trend with daily CMP    VTE Risk Mitigation (From admission, onward)           Ordered     Reason for No Pharmacological VTE Prophylaxis  Once        Question:  Reasons:  Answer:  Thrombocytopenia    02/05/24 0153                    Disposition: Remain inpatient     Liliam Ennis NP  Bone Marrow Transplant  [facility] - Telemetry Stepdown

## 2024-02-06 NOTE — ASSESSMENT & PLAN NOTE
- Started on acyclovir 400mg BID  - Started posaconazole 300mg daily. 300mg BID administered as starting dose on 2/5.   - Will transition to Levaquin 500mg orally once completed 5 days of Levaquin 750mg

## 2024-02-06 NOTE — CARE UPDATE
COVID positive on respiratory panel  Will transfer off unit  CXR did not show any signs of PNA or infiltrate  O2 sat on RA was 98%    Her COVID complication risk score is: 5  >3 warrants Remdesivir prophylaxis, which I have ordered     Latest Reference Range & Units 02/05/24 02:15   SARS-CoV-2 RNA, Amplification, Qual Negative  Negative      Latest Reference Range & Units 02/05/24 11:41   Adenovirus Not Detected  Not Detected   Bordetella Parapertussis (LR4616) Not Detected  Not Detected   Bordetella pertussis (ptxP) Not Detected  Not Detected   Chlamydia pneumoniae Not Detected  Not Detected   Coronavirus 229E, Common Cold Virus Not Detected  Not Detected   Coronavirus HKU1, Common Cold Virus Not Detected  Not Detected   Coronavirus NL63, Common Cold Virus Not Detected  Not Detected   Coronavirus OC43, Common Cold Virus Not Detected  Not Detected   Human Metapneumovirus Not Detected  Not Detected   Human Rhinovirus/Enterovirus Not Detected  Not Detected   Influenza A (subtypes H1, H1-2009,H3) Not Detected  Not Detected   Influenza B Not Detected  Not Detected   Mycoplasma pneumoniae Not Detected  Not Detected   Parainfluenza Virus 1 Not Detected  Not Detected   Parainfluenza Virus 2 Not Detected  Not Detected   Parainfluenza Virus 3 Not Detected  Not Detected   Parainfluenza Virus 4 Not Detected  Not Detected   Respiratory Infection Panel Source  NP Swab   Respiratory Syncytial Virus Not Detected  Not Detected   SARS-CoV2 (COVID-19) Qualitative PCR Not Detected  Detected !      X-Ray Chest AP Portable  Impression:  No acute cardiopulmonary process.    COMPARISON: January 7, 2024    Findings: The heart is normal in size without pulmonary edema. Aortic calcifications. Calcified mediastinal lymph nodes. No focal consolidation, pleural effusion or pneumothorax. Osseous structures are intact.  Exam End: 02/04/24 16:22 Last Resulted: 02/04/24 16:22   Received From: [facility] and [facility]

## 2024-02-06 NOTE — NURSING
1u of plt and 2 u of PRBC today. Large BM x 2 today. Good UO and bladder scans 0 and 27ml. Daughter at bedside. Pain med x 1 for back. No fevers.

## 2024-02-06 NOTE — ASSESSMENT & PLAN NOTE
- Diagnosed in 2018. S/P 7+3+mido. FLAG-JENNIFER reinduction. HiDAC x 3 consolidation. Transitioned to observation. Increase in blast of 11% on bone marrow in 2020. Received 4 cycles of venclexta and dacogen with response. 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 Began aza x 5 days, kirby 400mg x 28 days on 1/16/24.   - Chemotherapy on hold while admitted.   -Patient would like to discontinue chemotherapy and transition to supportive care with transfusions as needed  - Weekly labs at [facility] ordered. Follow up with Dr. Troy scheduled on 2/22/24

## 2024-02-06 NOTE — PLAN OF CARE
Problem: Adult Inpatient Plan of Care  Goal: Plan of Care Review  Outcome: Ongoing, Progressing  Goal: Patient-Specific Goal (Individualized)  Outcome: Ongoing, Progressing  Goal: Absence of Hospital-Acquired Illness or Injury  Outcome: Ongoing, Progressing  Goal: Optimal Comfort and Wellbeing  Outcome: Ongoing, Progressing  Goal: Readiness for Transition of Care  Outcome: Ongoing, Progressing     Problem: Adjustment to Illness (Sepsis/Septic Shock)  Goal: Optimal Coping  Outcome: Ongoing, Progressing     Problem: Bleeding (Sepsis/Septic Shock)  Goal: Absence of Bleeding  Outcome: Ongoing, Progressing     Problem: Glycemic Control Impaired (Sepsis/Septic Shock)  Goal: Blood Glucose Level Within Desired Range  Outcome: Ongoing, Progressing     Problem: Infection Progression (Sepsis/Septic Shock)  Goal: Absence of Infection Signs and Symptoms  Outcome: Ongoing, Progressing     Problem: Nutrition Impaired (Sepsis/Septic Shock)  Goal: Optimal Nutrition Intake  Outcome: Ongoing, Progressing     Problem: Fluid and Electrolyte Imbalance (Acute Kidney Injury/Impairment)  Goal: Fluid and Electrolyte Balance  Outcome: Ongoing, Progressing     Problem: Oral Intake Inadequate (Acute Kidney Injury/Impairment)  Goal: Optimal Nutrition Intake  Outcome: Ongoing, Progressing     Problem: Renal Function Impairment (Acute Kidney Injury/Impairment)  Goal: Effective Renal Function  Outcome: Ongoing, Progressing     Problem: Skin Injury Risk Increased  Goal: Skin Health and Integrity  Outcome: Ongoing, Progressing     Problem: Infection  Goal: Absence of Infection Signs and Symptoms  Outcome: Ongoing, Progressing     Problem: Fall Injury Risk  Goal: Absence of Fall and Fall-Related Injury  Outcome: Ongoing, Progressing     Pt exhibits intermittent confusion. Anxious. Hypertensive. Routine meds given. Denies pain. Complaints of itching, PRN Cetirizine offered but refused. Bed locked and in lowest position. Call light within reach.  Family at bedside. Monitoring ongoing.

## 2024-02-06 NOTE — NURSING
Nurses Note -- 4 Eyes      2/5/2024   11:38 PM      Skin assessed during: Transfer      [x] No Altered Skin Integrity Present    [x]Prevention Measures Documented      [] Yes- Altered Skin Integrity Present or Discovered   [] LDA Added if Not in Epic (Describe Wound)   [] New Altered Skin Integrity was Present on Admit and Documented in LDA   [] Wound Image Taken    Wound Care Consulted? No    Attending Nurse:   Marilyn Chavez RN     Second RN/Staff Member:   ESTELA Mitchell

## 2024-02-06 NOTE — PLAN OF CARE
Problem: Adult Inpatient Plan of Care  Goal: Plan of Care Review  Outcome: Ongoing, Progressing  Goal: Patient-Specific Goal (Individualized)  Outcome: Ongoing, Progressing  Goal: Absence of Hospital-Acquired Illness or Injury  Outcome: Ongoing, Progressing  Goal: Optimal Comfort and Wellbeing  Outcome: Ongoing, Progressing  Goal: Readiness for Transition of Care  Outcome: Ongoing, Progressing     Problem: Adjustment to Illness (Sepsis/Septic Shock)  Goal: Optimal Coping  Outcome: Ongoing, Progressing     Problem: Bleeding (Sepsis/Septic Shock)  Goal: Absence of Bleeding  Outcome: Ongoing, Progressing     Problem: Glycemic Control Impaired (Sepsis/Septic Shock)  Goal: Blood Glucose Level Within Desired Range  Outcome: Ongoing, Progressing     Problem: Infection Progression (Sepsis/Septic Shock)  Goal: Absence of Infection Signs and Symptoms  Outcome: Ongoing, Progressing     Problem: Nutrition Impaired (Sepsis/Septic Shock)  Goal: Optimal Nutrition Intake  Outcome: Ongoing, Progressing     Problem: Fluid and Electrolyte Imbalance (Acute Kidney Injury/Impairment)  Goal: Fluid and Electrolyte Balance  Outcome: Ongoing, Progressing     Problem: Oral Intake Inadequate (Acute Kidney Injury/Impairment)  Goal: Optimal Nutrition Intake  Outcome: Ongoing, Progressing    Patient transferred from another floor. In distress upon arriving, once transferred and changed patient. Patient calmed down once in bed. Given PRN pain medication.  Rested the remaining of shift with no acute events. Family at bedside. Answered questions regarding plan of care.

## 2024-02-07 ENCOUNTER — DOCUMENTATION ONLY (OUTPATIENT)
Dept: TRANSPLANT | Facility: HOSPITAL | Age: 74
End: 2024-02-07
Payer: MEDICARE

## 2024-02-07 VITALS — HEART RATE: 73 BPM

## 2024-02-07 LAB
ALBUMIN SERPL BCP-MCNC: 2.4 G/DL (ref 3.5–5.2)
ALP SERPL-CCNC: 136 U/L (ref 55–135)
ALT SERPL W/O P-5'-P-CCNC: 81 U/L (ref 10–44)
ANION GAP SERPL CALC-SCNC: 6 MMOL/L (ref 8–16)
ANISOCYTOSIS BLD QL SMEAR: SLIGHT
AST SERPL-CCNC: 21 U/L (ref 10–40)
BASOPHILS # BLD AUTO: 0 K/UL (ref 0–0.2)
BASOPHILS NFR BLD: 0 % (ref 0–1.9)
BILIRUB SERPL-MCNC: 1.6 MG/DL (ref 0.1–1)
BLD PROD TYP BPU: NORMAL
BLOOD UNIT EXPIRATION DATE: NORMAL
BLOOD UNIT TYPE CODE: 5100
BLOOD UNIT TYPE: NORMAL
BUN SERPL-MCNC: 29 MG/DL (ref 8–23)
BURR CELLS BLD QL SMEAR: ABNORMAL
CALCIUM SERPL-MCNC: 9 MG/DL (ref 8.7–10.5)
CHLORIDE SERPL-SCNC: 108 MMOL/L (ref 95–110)
CO2 SERPL-SCNC: 23 MMOL/L (ref 23–29)
CODING SYSTEM: NORMAL
CREAT SERPL-MCNC: 0.9 MG/DL (ref 0.5–1.4)
CROSSMATCH INTERPRETATION: NORMAL
DACRYOCYTES BLD QL SMEAR: ABNORMAL
DIFFERENTIAL METHOD BLD: ABNORMAL
DISPENSE STATUS: NORMAL
EOSINOPHIL # BLD AUTO: 0 K/UL (ref 0–0.5)
EOSINOPHIL NFR BLD: 0 % (ref 0–8)
ERYTHROCYTE [DISTWIDTH] IN BLOOD BY AUTOMATED COUNT: 26 % (ref 11.5–14.5)
EST. GFR  (NO RACE VARIABLE): >60 ML/MIN/1.73 M^2
GLUCOSE SERPL-MCNC: 87 MG/DL (ref 70–110)
HCT VFR BLD AUTO: 25.5 % (ref 37–48.5)
HGB BLD-MCNC: 8.1 G/DL (ref 12–16)
HYPOCHROMIA BLD QL SMEAR: ABNORMAL
IMM GRANULOCYTES # BLD AUTO: 0 K/UL (ref 0–0.04)
IMM GRANULOCYTES NFR BLD AUTO: 0 % (ref 0–0.5)
LYMPHOCYTES # BLD AUTO: 0.3 K/UL (ref 1–4.8)
LYMPHOCYTES NFR BLD: 97 % (ref 18–48)
MAGNESIUM SERPL-MCNC: 1.9 MG/DL (ref 1.6–2.6)
MCH RBC QN AUTO: 28.1 PG (ref 27–31)
MCHC RBC AUTO-ENTMCNC: 31.8 G/DL (ref 32–36)
MCV RBC AUTO: 89 FL (ref 82–98)
MONOCYTES # BLD AUTO: 0 K/UL (ref 0.3–1)
MONOCYTES NFR BLD: 0 % (ref 4–15)
NEUTROPHILS # BLD AUTO: 0 K/UL (ref 1.8–7.7)
NEUTROPHILS NFR BLD: 3 % (ref 38–73)
NRBC BLD-RTO: 6 /100 WBC
OVALOCYTES BLD QL SMEAR: ABNORMAL
PHOSPHATE SERPL-MCNC: 2.9 MG/DL (ref 2.7–4.5)
PLATELET # BLD AUTO: 3 K/UL (ref 150–450)
PLATELET BLD QL SMEAR: ABNORMAL
PMV BLD AUTO: ABNORMAL FL (ref 9.2–12.9)
POIKILOCYTOSIS BLD QL SMEAR: SLIGHT
POLYCHROMASIA BLD QL SMEAR: ABNORMAL
POTASSIUM SERPL-SCNC: 4.1 MMOL/L (ref 3.5–5.1)
PROT SERPL-MCNC: 4.8 G/DL (ref 6–8.4)
RBC # BLD AUTO: 2.88 M/UL (ref 4–5.4)
SODIUM SERPL-SCNC: 137 MMOL/L (ref 136–145)
UNIT NUMBER: NORMAL
WBC # BLD AUTO: 0.33 K/UL (ref 3.9–12.7)

## 2024-02-07 PROCEDURE — 99232 SBSQ HOSP IP/OBS MODERATE 35: CPT | Mod: ,,, | Performed by: INTERNAL MEDICINE

## 2024-02-07 PROCEDURE — 63600175 PHARM REV CODE 636 W HCPCS: Performed by: HOSPITALIST

## 2024-02-07 PROCEDURE — 80053 COMPREHEN METABOLIC PANEL: CPT

## 2024-02-07 PROCEDURE — 63600175 PHARM REV CODE 636 W HCPCS: Mod: JZ,TB | Performed by: INTERNAL MEDICINE

## 2024-02-07 PROCEDURE — P9073 PLATELETS PHERESIS PATH REDU: HCPCS

## 2024-02-07 PROCEDURE — 83735 ASSAY OF MAGNESIUM: CPT

## 2024-02-07 PROCEDURE — 20600001 HC STEP DOWN PRIVATE ROOM

## 2024-02-07 PROCEDURE — 85025 COMPLETE CBC W/AUTO DIFF WBC: CPT

## 2024-02-07 PROCEDURE — 25000003 PHARM REV CODE 250

## 2024-02-07 PROCEDURE — 25000003 PHARM REV CODE 250: Performed by: INTERNAL MEDICINE

## 2024-02-07 PROCEDURE — 27000207 HC ISOLATION

## 2024-02-07 PROCEDURE — 36430 TRANSFUSION BLD/BLD COMPNT: CPT

## 2024-02-07 PROCEDURE — 63600175 PHARM REV CODE 636 W HCPCS

## 2024-02-07 PROCEDURE — 84100 ASSAY OF PHOSPHORUS: CPT

## 2024-02-07 PROCEDURE — A4216 STERILE WATER/SALINE, 10 ML: HCPCS | Performed by: INTERNAL MEDICINE

## 2024-02-07 PROCEDURE — 25000003 PHARM REV CODE 250: Performed by: HOSPITALIST

## 2024-02-07 PROCEDURE — P9073 PLATELETS PHERESIS PATH REDU: HCPCS | Mod: 91

## 2024-02-07 RX ORDER — ACYCLOVIR 200 MG/1
400 CAPSULE ORAL 2 TIMES DAILY
Qty: 120 CAPSULE | Refills: 11 | OUTPATIENT
Start: 2024-02-07 | End: 2025-02-06

## 2024-02-07 RX ORDER — LEVOFLOXACIN 500 MG/1
500 TABLET, FILM COATED ORAL DAILY
Qty: 30 TABLET | Refills: 1 | OUTPATIENT
Start: 2024-02-12

## 2024-02-07 RX ORDER — LORAZEPAM 2 MG/1
2 TABLET ORAL NIGHTLY PRN
Qty: 30 TABLET | Refills: 0 | OUTPATIENT
Start: 2024-02-07 | End: 2024-03-08

## 2024-02-07 RX ORDER — LIDOCAINE 50 MG/G
1 PATCH TOPICAL DAILY
Qty: 30 PATCH | Refills: 0 | OUTPATIENT
Start: 2024-02-07

## 2024-02-07 RX ORDER — SENNOSIDES 8.6 MG/1
1 TABLET ORAL DAILY
Qty: 30 TABLET | Refills: 1 | OUTPATIENT
Start: 2024-02-08

## 2024-02-07 RX ORDER — LEVOFLOXACIN 750 MG/1
750 TABLET ORAL DAILY
Qty: 3 TABLET | Refills: 0 | OUTPATIENT
Start: 2024-02-08

## 2024-02-07 RX ORDER — HYDROCODONE BITARTRATE AND ACETAMINOPHEN 500; 5 MG/1; MG/1
TABLET ORAL
Status: DISCONTINUED | OUTPATIENT
Start: 2024-02-07 | End: 2024-02-08 | Stop reason: HOSPADM

## 2024-02-07 RX ORDER — POSACONAZOLE 100 MG/1
300 TABLET, DELAYED RELEASE ORAL DAILY
Qty: 30 TABLET | Refills: 1 | OUTPATIENT
Start: 2024-02-08

## 2024-02-07 RX ADMIN — REMDESIVIR 100 MG: 100 INJECTION, POWDER, LYOPHILIZED, FOR SOLUTION INTRAVENOUS at 08:02

## 2024-02-07 RX ADMIN — OXYCODONE HYDROCHLORIDE 10 MG: 10 TABLET ORAL at 08:02

## 2024-02-07 RX ADMIN — ESCITALOPRAM OXALATE 20 MG: 20 TABLET, FILM COATED ORAL at 08:02

## 2024-02-07 RX ADMIN — OXYCODONE HYDROCHLORIDE 10 MG: 10 TABLET ORAL at 12:02

## 2024-02-07 RX ADMIN — SENNOSIDES 8.6 MG: 8.6 TABLET, FILM COATED ORAL at 08:02

## 2024-02-07 RX ADMIN — METOPROLOL SUCCINATE 25 MG: 25 TABLET, EXTENDED RELEASE ORAL at 08:02

## 2024-02-07 RX ADMIN — PANTOPRAZOLE SODIUM 40 MG: 40 TABLET, DELAYED RELEASE ORAL at 08:02

## 2024-02-07 RX ADMIN — LEVOFLOXACIN 750 MG: 750 TABLET, FILM COATED ORAL at 08:02

## 2024-02-07 RX ADMIN — POSACONAZOLE 300 MG: 100 TABLET, DELAYED RELEASE ORAL at 08:02

## 2024-02-07 RX ADMIN — PREDNISONE 20 MG: 20 TABLET ORAL at 08:02

## 2024-02-07 RX ADMIN — SODIUM CHLORIDE, POTASSIUM CHLORIDE, SODIUM LACTATE AND CALCIUM CHLORIDE: 600; 310; 30; 20 INJECTION, SOLUTION INTRAVENOUS at 12:02

## 2024-02-07 RX ADMIN — LORAZEPAM 2 MG: 1 TABLET ORAL at 08:02

## 2024-02-07 RX ADMIN — Medication 10 ML: at 06:02

## 2024-02-07 RX ADMIN — ACYCLOVIR 400 MG: 200 CAPSULE ORAL at 08:02

## 2024-02-07 RX ADMIN — CETIRIZINE HYDROCHLORIDE 10 MG: 10 TABLET, FILM COATED ORAL at 08:02

## 2024-02-07 RX ADMIN — Medication 10 ML: at 12:02

## 2024-02-07 RX ADMIN — LIDOCAINE 1 PATCH: 700 PATCH TOPICAL at 03:02

## 2024-02-07 NOTE — ASSESSMENT & PLAN NOTE
- CT abd at [facility] noted constipation   - Start lactulose upon arrival. 2 bowel movements on 2/5. Transitioned to maintenance senna daily on 2/6.

## 2024-02-07 NOTE — PROGRESS NOTES
[facility] - Telemetry Stepdown  Hematology  Bone Marrow Transplant  Progress Note    Patient Name: Sabrina Mac  Admission Date: 2/5/2024  Hospital Length of Stay: 2 days  Code Status: Full Code    Subjective:     Interval History: AML patient s/p cycle 1 aza/kirby admitted with covid-19 and neutropenic fever. Remains cytopenic. 1 unit of platelets administered today. Blood cultures NGTD. Urine culture with no growth. Transitioned antibiotics to oral levauqin. Ms. Mac would like to transition to supportive care measures. She is ready to go home, but will need assistive devices and home health. Ordered PT/OT evaluation while admitted.     Objective:     Vital Signs (Most Recent):  Temp: 98.1 °F (36.7 °C) (02/07/24 1121)  Pulse: 77 (02/07/24 1121)  Resp: 20 (02/07/24 1247)  BP: (!) 146/72 (02/07/24 1121)  SpO2: 97 % (02/07/24 1121) Vital Signs (24h Range):  Temp:  [97.7 °F (36.5 °C)-98.2 °F (36.8 °C)] 98.1 °F (36.7 °C)  Pulse:  [71-79] 77  Resp:  [17-21] 20  SpO2:  [93 %-98 %] 97 %  BP: (112-157)/(62-85) 146/72        There is no height or weight on file to calculate BMI.  There is no height or weight on file to calculate BSA.      Intake/Output - Last 3 Shifts         02/05 0700  02/06 0659 02/06 0700  02/07 0659 02/07 0700  02/08 0659    P.O.       Blood 1061.3      Total Intake 1061.3      Urine 1550 350 450    Stool 0      Total Output 1550 350 450    Net -488.8 -350 -450           Urine Occurrence 1 x      Stool Occurrence 2 x               Physical Exam  Constitutional:       General: She is not in acute distress.  Eyes:      General: No scleral icterus.     Extraocular Movements: Extraocular movements intact.      Conjunctiva/sclera: Conjunctivae normal.   Cardiovascular:      Rate and Rhythm: Normal rate and regular rhythm.      Heart sounds: Normal heart sounds.   Pulmonary:      Effort: Pulmonary effort is normal. No respiratory distress.      Breath sounds: Normal breath sounds.   Abdominal:       General: Bowel sounds are normal. There is no distension.      Palpations: Abdomen is soft.      Tenderness: There is no abdominal tenderness. There is no guarding.   Musculoskeletal:      Right lower leg: No edema.      Left lower leg: No edema.   Skin:     General: Skin is warm and dry.      Findings: Bruising present. No rash.   Neurological:      Mental Status: She is alert and oriented to person, place, and time.      Motor: Weakness present.   Psychiatric:         Mood and Affect: Mood normal.            Significant Labs:   CBC:   Recent Labs   Lab 02/06/24  0507 02/07/24  0646   WBC 0.25* 0.33*   HGB 8.4* 8.1*   HCT 26.6* 25.5*   PLT 7* 3*    and CMP:   Recent Labs   Lab 02/06/24  0507 02/07/24  0646    137   K 4.3 4.1    108   CO2 22* 23   GLU 95 87   BUN 27* 29*   CREATININE 1.2 0.9   CALCIUM 9.0 9.0   PROT 5.1* 4.8*   ALBUMIN 2.4* 2.4*   BILITOT 1.9* 1.6*   ALKPHOS 142* 136*   AST 48* 21   * 81*   ANIONGAP 7* 6*       Diagnostic Results:  None  Assessment/Plan:     * Neutropenic fever  - Transferred for [facility] per family request  - [place] [facility]  - Lactic acid repeated with decrease from 2.7 to 1.3   - Blood cultures x 2 completed on 2/5/24- NGTD  - Urine culture- negative   - Respiratory viral panel ordered on 2/5/24- COVID +19  - Lactated ringers 50 ml/hr for blood pressure and kidney support.   - Will transition for cefepime after 2 days to levauqin 750mg for 5 days then reduce dose to 500mg levauqin daily for prophylaxis.        Itching  - Cetrizine ordered     Voice hoarseness  - Chest x-ray without acute process.   - respiratory viral panel on 2/5/24 revealing COVID-19    Constipation  - CT abd at [facility] noted constipation   - Start lactulose upon arrival. 2 bowel movements on 2/5. Transitioned to maintenance senna daily on 2/6.     UTI (urinary tract infection)  - Management per neutropenic fever   - Urine culture negative on  2/6    Immunodeficiency due to conditions classified elsewhere  - Started on acyclovir 400mg BID  - Started posaconazole 300mg daily. 300mg BID administered as starting dose on 2/5.   - Will transition to Levaquin 500mg orally once completed 5 days of Levaquin 750mg     Impaired mobility  - Consulted PT/OT  - Will order home DME to aid with mobility and activities of daily living     Transaminitis  - Improving. Trend with daily CMP    Pancytopenia  - Transfuse 1 unit of PRBC for hgb <7 or symptomatic.   - Transfuse 1 unit of platelets for platelets <10 or symptomatic.   -1 unit of platelets ordered on 2/7.       AML (acute myeloblastic leukemia)  - Diagnosed in 2018. S/P 7+3+mido. FLAG-JENNIFER reinduction. HiDAC x 3 consolidation. Transitioned to observation. Increase in blast of 11% on bone marrow in 2020. Received 4 cycles of venclexta and dacogen with response. 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 Began aza x 5 days, kirby 400mg x 28 days on 1/16/24.   - Chemotherapy on hold while admitted.   - Patient would like to discontinue chemotherapy and transition to supportive care with transfusions as needed  - Weekly labs at [facility] ordered. Follow up with Dr. Troy scheduled on 2/22/24      Back pain  - Lidocaine patch ordered   - Oxycodone ordered prn   - Non-obstructing kidney stone noted on CT from [facility]       Depression with anxiety  - continue home escitalpram nightly   - Switched prn xanax to prn ativan due to medication interaction with posaconazole and xanax.         VTE Risk Mitigation (From admission, onward)           Ordered     Reason for No Pharmacological VTE Prophylaxis  Once        Question:  Reasons:  Answer:  Thrombocytopenia    02/05/24 0153                    Disposition: Remain inpatient     Liliam Ennis NP  Bone Marrow Transplant  [facility] - Telemetry Stepdown

## 2024-02-07 NOTE — ASSESSMENT & PLAN NOTE
- Transfuse 1 unit of PRBC for hgb <7 or symptomatic.   - Transfuse 1 unit of platelets for platelets <10 or symptomatic.   -1 unit of platelets ordered on 2/7.

## 2024-02-07 NOTE — PLAN OF CARE
Plan of Care Note:    [facility]  Department of Hospital Medicine  [facility] [place] [address]  [phone] after hours  [phone] fax    HOSPICE  ORDERS    02/07/2024    Admit to Hospice:  Home Service    Diagnoses:   Active Hospital Problems    Diagnosis  POA    *Neutropenic fever [D70.9, R50.81]  Yes    Itching [L29.9]  No    UTI (urinary tract infection) [N39.0]  Yes    Constipation [K59.00]  Yes    Voice hoarseness [R49.0]  Yes    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]  Yes    Impaired mobility [Z74.09]  Yes    Transaminitis [R74.01]  Yes    Pancytopenia [D61.818]  Yes    AML (acute myeloblastic leukemia) [C92.00]  Yes    Back pain [M54.9]  Yes    Depression with anxiety [F41.8]  Yes      Resolved Hospital Problems   No resolved problems to display.       Hospice Qualifying Diagnoses: Acute Myeloid Leukemia       Patient has a life expectancy < 6 months due to:      Vital Signs: Routine per Hospice Protocol.    Code Status: DNR    Allergies:   Review of patient's allergies indicates:   Allergen Reactions    Methotrexate analogues      Elevated Liver Enzyme    Sulfa (sulfonamide antibiotics)      Rash, nausea, vomiting    Bactrim [sulfamethoxazole-trimethoprim] Nausea And Vomiting and Rash       Diet: Regular    Activities: As tolerated    Goals of Care Treatment Preferences:  Code Status: Full Code      Nursing:     Castro Care: Empty Castro bag Q shift and PRN.  Change Castro every month.    Routine Skin for Bedridden Patients: Apply moisture barrier cream to all skin folds and   wet areas in perineal area daily and after baths and all bowel movements.      Oxygen: Not Indicated at this time  Other Miscellaneous Care: Medications:          Medication List        ASK your doctor about these medications      acyclovir 5% 5 % ointment  Commonly known as: ZOVIRAX  Apply topically every 3 (three) hours.     ALPRAZolam 2 MG Tab  Commonly  known as: XANAX  TAKE ONE TABLET BY MOUTH NIGHTLY AS NEEDED     BANOPHEN 50 MG capsule  Generic drug: diphenhydrAMINE  TAKE ONE CAPSULE BY MOUTH NIGHTLY AS NEEDED FOR ITCHING OR INSOMNIA     EScitalopram oxalate 20 MG tablet  Commonly known as: LEXAPRO  TAKE ONE TABLET BY MOUTH EACH EVENING     methocarbamoL 500 MG Tab  Commonly known as: ROBAXIN  Take 1 tablet (500 mg total) by mouth 4 (four) times daily.     metoprolol succinate 25 MG 24 hr tablet  Commonly known as: TOPROL-XL  Take 1 tablet (25 mg total) by mouth once daily.     MULTIVITAMIN GUMMIES ORAL  Take by mouth.     nitrofurantoin (macrocrystal-monohydrate) 100 MG capsule  Commonly known as: MACROBID  Take 1 capsule (100 mg total) by mouth 2 (two) times daily.     omeprazole 20 MG capsule  Commonly known as: PRILOSEC  Take 1 capsule (20 mg total) by mouth 2 (two) times daily before meals.     ondansetron 8 MG tablet  Commonly known as: ZOFRAN  Take 1 tablet (8 mg total) by mouth every 12 (twelve) hours as needed for Nausea.     ondansetron 8 MG Tbdl  Commonly known as: ZOFRAN-ODT  Take 8 mg by mouth 3 (three) times daily.     * oxyCODONE 20 mg Tab immediate release tablet  Commonly known as: ROXICODONE  Take 1 tablet (20 mg total) by mouth every 6 (six) hours as needed (pain).     * oxyCODONE 10 mg Tab immediate release tablet  Commonly known as: ROXICODONE  Take 1 tablet (10 mg total) by mouth every 6 (six) hours as needed (pain).     * oxyCODONE 10 mg Tab immediate release tablet  Commonly known as: ROXICODONE  Take 1 tablet (10 mg total) by mouth every 4 (four) hours as needed for Pain.     * predniSONE 20 MG tablet  Commonly known as: DELTASONE  TAKE ONE TABLET BY MOUTH ONCE DAILY AS NEEDED     * predniSONE 20 MG tablet  Commonly known as: DELTASONE  Take 1 tablet (20 mg total) by mouth once daily.     tiZANidine 4 MG tablet  Commonly known as: ZANAFLEX  Take 1 tablet (4 mg total) by mouth 3 (three) times daily.     VENCLEXTA 100 mg Tab  Generic drug:  venetoclax  Take 400 mg (4 tablets) by mouth once daily.           * This list has 5 medication(s) that are the same as other medications prescribed for you. Read the directions carefully, and ask your doctor or other care provider to review them with you.                    Future Orders:  Hospice Medical Director may dictate new orders for comfortable care measures & sign death certificate.        _________________________________  Hector Amin,   02/07/2024

## 2024-02-07 NOTE — PLAN OF CARE
Problem: Adult Inpatient Plan of Care  Goal: Plan of Care Review  Outcome: Ongoing, Progressing  Goal: Patient-Specific Goal (Individualized)  Outcome: Ongoing, Progressing  Goal: Absence of Hospital-Acquired Illness or Injury  Outcome: Ongoing, Progressing  Goal: Optimal Comfort and Wellbeing  Outcome: Ongoing, Progressing  Goal: Readiness for Transition of Care  Outcome: Ongoing, Progressing     Problem: Adjustment to Illness (Sepsis/Septic Shock)  Goal: Optimal Coping  Outcome: Ongoing, Progressing     Problem: Bleeding (Sepsis/Septic Shock)  Goal: Absence of Bleeding  Outcome: Ongoing, Progressing     Problem: Glycemic Control Impaired (Sepsis/Septic Shock)  Goal: Blood Glucose Level Within Desired Range  Outcome: Ongoing, Progressing     Problem: Infection Progression (Sepsis/Septic Shock)  Goal: Absence of Infection Signs and Symptoms  Outcome: Ongoing, Progressing     Problem: Nutrition Impaired (Sepsis/Septic Shock)  Goal: Optimal Nutrition Intake  Outcome: Ongoing, Progressing     Problem: Fluid and Electrolyte Imbalance (Acute Kidney Injury/Impairment)  Goal: Fluid and Electrolyte Balance  Outcome: Ongoing, Progressing     Problem: Oral Intake Inadequate (Acute Kidney Injury/Impairment)  Goal: Optimal Nutrition Intake  Outcome: Ongoing, Progressing     Problem: Renal Function Impairment (Acute Kidney Injury/Impairment)  Goal: Effective Renal Function  Outcome: Ongoing, Progressing     Problem: Skin Injury Risk Increased  Goal: Skin Health and Integrity  Outcome: Ongoing, Progressing     Problem: Infection  Goal: Absence of Infection Signs and Symptoms  Outcome: Ongoing, Progressing     Problem: Fall Injury Risk  Goal: Absence of Fall and Fall-Related Injury  Outcome: Ongoing, Progressing

## 2024-02-07 NOTE — PROGRESS NOTES
Spoke with patient and family at bedside. They are in agreement with hospice care over DME supplies and home health. Goals of care include comfort, pain management, and interventions that will not lead to pain such as lab work. Discussed mobility limitations and desire to not proceed with PT/OT. Discussed not being able to proceed with transfusions while on hospice. Family and patient understand and want to have materials at home to allow ease of intervention for Ms. Sorto to be comfortable in her own home. Patient daughter is currently on FMLA, patient sister at bedside, patient lives with  at home as additional caregiver.   Reviewed likely discharge on 2/8/24 to hospice care. Updated primary team including social work team.

## 2024-02-07 NOTE — ASSESSMENT & PLAN NOTE
- Diagnosed in 2018. S/P 7+3+mido. FLAG-JENNIFER reinduction. HiDAC x 3 consolidation. Transitioned to observation. Increase in blast of 11% on bone marrow in 2020. Received 4 cycles of venclexta and dacogen with response. 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 Began aza x 5 days, kirby 400mg x 28 days on 1/16/24.   - Chemotherapy on hold while admitted.   - Patient would like to discontinue chemotherapy and transition to supportive care with transfusions as needed  - Weekly labs at [facility] ordered. Follow up with Dr. Troy scheduled on 2/22/24

## 2024-02-07 NOTE — ASSESSMENT & PLAN NOTE
- Transferred for [facility] per family request  - Afebrile since arrival to [facility]  - Lactic acid repeated with decrease from 2.7 to 1.3   - Blood cultures x 2 completed on 2/5/24- NGTD  - Urine culture- negative   - Respiratory viral panel ordered on 2/5/24- COVID +19  - Lactated ringers 50 ml/hr for blood pressure and kidney support.   - Will transition for cefepime after 2 days to levauqin 750mg for 5 days then reduce dose to 500mg levauqin daily for prophylaxis.

## 2024-02-07 NOTE — SUBJECTIVE & OBJECTIVE
Subjective:     Interval History: AML patient s/p cycle 1 aza/kirby admitted with covid-19 and neutropenic fever. Remains cytopenic. 1 unit of platelets administered today. Blood cultures NGTD. Urine culture with no growth. Transitioned antibiotics to oral levauqin. Ms. Mac would like to transition to supportive care measures. She is ready to go home, but will need assistive devices and home health. Ordered PT/OT evaluation while admitted.     Objective:     Vital Signs (Most Recent):  Temp: 98.1 °F (36.7 °C) (02/07/24 1121)  Pulse: 77 (02/07/24 1121)  Resp: 20 (02/07/24 1247)  BP: (!) 146/72 (02/07/24 1121)  SpO2: 97 % (02/07/24 1121) Vital Signs (24h Range):  Temp:  [97.7 °F (36.5 °C)-98.2 °F (36.8 °C)] 98.1 °F (36.7 °C)  Pulse:  [71-79] 77  Resp:  [17-21] 20  SpO2:  [93 %-98 %] 97 %  BP: (112-157)/(62-85) 146/72        There is no height or weight on file to calculate BMI.  There is no height or weight on file to calculate BSA.      Intake/Output - Last 3 Shifts         02/05 0700  02/06 0659 02/06 0700  02/07 0659 02/07 0700  02/08 0659    P.O.       Blood 1061.3      Total Intake 1061.3      Urine 1550 350 450    Stool 0      Total Output 1550 350 450    Net -488.8 -350 -450           Urine Occurrence 1 x      Stool Occurrence 2 x               Physical Exam  Constitutional:       General: She is not in acute distress.  Eyes:      General: No scleral icterus.     Extraocular Movements: Extraocular movements intact.      Conjunctiva/sclera: Conjunctivae normal.   Cardiovascular:      Rate and Rhythm: Normal rate and regular rhythm.      Heart sounds: Normal heart sounds.   Pulmonary:      Effort: Pulmonary effort is normal. No respiratory distress.      Breath sounds: Normal breath sounds.   Abdominal:      General: Bowel sounds are normal. There is no distension.      Palpations: Abdomen is soft.      Tenderness: There is no abdominal tenderness. There is no guarding.   Musculoskeletal:      Right lower leg:  No edema.      Left lower leg: No edema.   Skin:     General: Skin is warm and dry.      Findings: Bruising present. No rash.   Neurological:      Mental Status: She is alert and oriented to person, place, and time.      Motor: Weakness present.   Psychiatric:         Mood and Affect: Mood normal.            Significant Labs:   CBC:   Recent Labs   Lab 02/06/24  0507 02/07/24  0646   WBC 0.25* 0.33*   HGB 8.4* 8.1*   HCT 26.6* 25.5*   PLT 7* 3*    and CMP:   Recent Labs   Lab 02/06/24  0507 02/07/24  0646    137   K 4.3 4.1    108   CO2 22* 23   GLU 95 87   BUN 27* 29*   CREATININE 1.2 0.9   CALCIUM 9.0 9.0   PROT 5.1* 4.8*   ALBUMIN 2.4* 2.4*   BILITOT 1.9* 1.6*   ALKPHOS 142* 136*   AST 48* 21   * 81*   ANIONGAP 7* 6*       Diagnostic Results:  None

## 2024-02-08 VITALS
TEMPERATURE: 99 F | RESPIRATION RATE: 21 BRPM | SYSTOLIC BLOOD PRESSURE: 132 MMHG | HEIGHT: 64 IN | DIASTOLIC BLOOD PRESSURE: 67 MMHG | WEIGHT: 199.06 LBS | HEART RATE: 85 BPM | BODY MASS INDEX: 33.98 KG/M2 | OXYGEN SATURATION: 96 %

## 2024-02-08 DIAGNOSIS — U07.1 COVID-19 VIRUS DETECTED: ICD-10-CM

## 2024-02-08 LAB
ABO + RH BLD: NORMAL
ALBUMIN SERPL BCP-MCNC: 2.4 G/DL (ref 3.5–5.2)
ALP SERPL-CCNC: 148 U/L (ref 55–135)
ALT SERPL W/O P-5'-P-CCNC: 61 U/L (ref 10–44)
ANION GAP SERPL CALC-SCNC: 6 MMOL/L (ref 8–16)
ANISOCYTOSIS BLD QL SMEAR: ABNORMAL
AST SERPL-CCNC: 22 U/L (ref 10–40)
BASOPHILS # BLD AUTO: 0 K/UL (ref 0–0.2)
BASOPHILS NFR BLD: 0 % (ref 0–1.9)
BILIRUB SERPL-MCNC: 1.7 MG/DL (ref 0.1–1)
BLD GP AB SCN CELLS X3 SERPL QL: NORMAL
BUN SERPL-MCNC: 28 MG/DL (ref 8–23)
BURR CELLS BLD QL SMEAR: ABNORMAL
CALCIUM SERPL-MCNC: 9.1 MG/DL (ref 8.7–10.5)
CHLORIDE SERPL-SCNC: 110 MMOL/L (ref 95–110)
CO2 SERPL-SCNC: 24 MMOL/L (ref 23–29)
CREAT SERPL-MCNC: 0.9 MG/DL (ref 0.5–1.4)
DIFFERENTIAL METHOD BLD: ABNORMAL
EOSINOPHIL # BLD AUTO: 0 K/UL (ref 0–0.5)
EOSINOPHIL NFR BLD: 0 % (ref 0–8)
ERYTHROCYTE [DISTWIDTH] IN BLOOD BY AUTOMATED COUNT: 25.8 % (ref 11.5–14.5)
EST. GFR  (NO RACE VARIABLE): >60 ML/MIN/1.73 M^2
GLUCOSE SERPL-MCNC: 82 MG/DL (ref 70–110)
HCT VFR BLD AUTO: 23.7 % (ref 37–48.5)
HGB BLD-MCNC: 7.5 G/DL (ref 12–16)
IMM GRANULOCYTES # BLD AUTO: 0 K/UL (ref 0–0.04)
IMM GRANULOCYTES NFR BLD AUTO: 0 % (ref 0–0.5)
LYMPHOCYTES # BLD AUTO: 0.3 K/UL (ref 1–4.8)
LYMPHOCYTES NFR BLD: 96.2 % (ref 18–48)
MAGNESIUM SERPL-MCNC: 1.7 MG/DL (ref 1.6–2.6)
MCH RBC QN AUTO: 27.6 PG (ref 27–31)
MCHC RBC AUTO-ENTMCNC: 31.6 G/DL (ref 32–36)
MCV RBC AUTO: 87 FL (ref 82–98)
MONOCYTES # BLD AUTO: 0 K/UL (ref 0.3–1)
MONOCYTES NFR BLD: 0 % (ref 4–15)
NEUTROPHILS # BLD AUTO: 0 K/UL (ref 1.8–7.7)
NEUTROPHILS NFR BLD: 3.8 % (ref 38–73)
NRBC BLD-RTO: 8 /100 WBC
OVALOCYTES BLD QL SMEAR: ABNORMAL
PHOSPHATE SERPL-MCNC: 2.8 MG/DL (ref 2.7–4.5)
PLATELET # BLD AUTO: 16 K/UL (ref 150–450)
PLATELET BLD QL SMEAR: ABNORMAL
PMV BLD AUTO: 9.5 FL (ref 9.2–12.9)
POIKILOCYTOSIS BLD QL SMEAR: SLIGHT
POTASSIUM SERPL-SCNC: 4 MMOL/L (ref 3.5–5.1)
PROT SERPL-MCNC: 4.7 G/DL (ref 6–8.4)
RBC # BLD AUTO: 2.72 M/UL (ref 4–5.4)
SODIUM SERPL-SCNC: 140 MMOL/L (ref 136–145)
SPECIMEN OUTDATE: NORMAL
WBC # BLD AUTO: 0.26 K/UL (ref 3.9–12.7)

## 2024-02-08 PROCEDURE — 25000003 PHARM REV CODE 250

## 2024-02-08 PROCEDURE — 63600175 PHARM REV CODE 636 W HCPCS: Performed by: HOSPITALIST

## 2024-02-08 PROCEDURE — 83735 ASSAY OF MAGNESIUM: CPT

## 2024-02-08 PROCEDURE — 99239 HOSP IP/OBS DSCHRG MGMT >30: CPT | Mod: ,,, | Performed by: INTERNAL MEDICINE

## 2024-02-08 PROCEDURE — 86901 BLOOD TYPING SEROLOGIC RH(D): CPT | Performed by: INTERNAL MEDICINE

## 2024-02-08 PROCEDURE — 25000003 PHARM REV CODE 250: Performed by: HOSPITALIST

## 2024-02-08 PROCEDURE — 84100 ASSAY OF PHOSPHORUS: CPT

## 2024-02-08 PROCEDURE — 63600175 PHARM REV CODE 636 W HCPCS

## 2024-02-08 PROCEDURE — A4216 STERILE WATER/SALINE, 10 ML: HCPCS | Performed by: INTERNAL MEDICINE

## 2024-02-08 PROCEDURE — 85025 COMPLETE CBC W/AUTO DIFF WBC: CPT

## 2024-02-08 PROCEDURE — 25000003 PHARM REV CODE 250: Performed by: INTERNAL MEDICINE

## 2024-02-08 PROCEDURE — 80053 COMPREHEN METABOLIC PANEL: CPT

## 2024-02-08 RX ADMIN — METOPROLOL SUCCINATE 25 MG: 25 TABLET, EXTENDED RELEASE ORAL at 08:02

## 2024-02-08 RX ADMIN — LIDOCAINE 1 PATCH: 700 PATCH TOPICAL at 03:02

## 2024-02-08 RX ADMIN — PREDNISONE 20 MG: 20 TABLET ORAL at 08:02

## 2024-02-08 RX ADMIN — SODIUM CHLORIDE, POTASSIUM CHLORIDE, SODIUM LACTATE AND CALCIUM CHLORIDE: 600; 310; 30; 20 INJECTION, SOLUTION INTRAVENOUS at 08:02

## 2024-02-08 RX ADMIN — OXYCODONE HYDROCHLORIDE 10 MG: 10 TABLET ORAL at 11:02

## 2024-02-08 RX ADMIN — CETIRIZINE HYDROCHLORIDE 10 MG: 10 TABLET, FILM COATED ORAL at 08:02

## 2024-02-08 RX ADMIN — LEVOFLOXACIN 750 MG: 750 TABLET, FILM COATED ORAL at 08:02

## 2024-02-08 RX ADMIN — SENNOSIDES 8.6 MG: 8.6 TABLET, FILM COATED ORAL at 08:02

## 2024-02-08 RX ADMIN — Medication 10 ML: at 12:02

## 2024-02-08 RX ADMIN — PANTOPRAZOLE SODIUM 40 MG: 40 TABLET, DELAYED RELEASE ORAL at 08:02

## 2024-02-08 RX ADMIN — POSACONAZOLE 300 MG: 100 TABLET, DELAYED RELEASE ORAL at 08:02

## 2024-02-08 RX ADMIN — Medication 10 ML: at 06:02

## 2024-02-08 RX ADMIN — ACYCLOVIR 400 MG: 200 CAPSULE ORAL at 08:02

## 2024-02-08 NOTE — PLAN OF CARE
Problem: Adult Inpatient Plan of Care  Goal: Plan of Care Review  Outcome: Met  Goal: Patient-Specific Goal (Individualized)  Outcome: Met  Goal: Absence of Hospital-Acquired Illness or Injury  Outcome: Met  Goal: Optimal Comfort and Wellbeing  Outcome: Met  Goal: Readiness for Transition of Care  Outcome: Met     Problem: Adjustment to Illness (Sepsis/Septic Shock)  Goal: Optimal Coping  Outcome: Met     Problem: Bleeding (Sepsis/Septic Shock)  Goal: Absence of Bleeding  Outcome: Met     Problem: Glycemic Control Impaired (Sepsis/Septic Shock)  Goal: Blood Glucose Level Within Desired Range  Outcome: Met     Problem: Infection Progression (Sepsis/Septic Shock)  Goal: Absence of Infection Signs and Symptoms  Outcome: Met     Problem: Nutrition Impaired (Sepsis/Septic Shock)  Goal: Optimal Nutrition Intake  Outcome: Met     Problem: Fluid and Electrolyte Imbalance (Acute Kidney Injury/Impairment)  Goal: Fluid and Electrolyte Balance  Outcome: Met     Problem: Oral Intake Inadequate (Acute Kidney Injury/Impairment)  Goal: Optimal Nutrition Intake  Outcome: Met     Problem: Renal Function Impairment (Acute Kidney Injury/Impairment)  Goal: Effective Renal Function  Outcome: Met     Problem: Skin Injury Risk Increased  Goal: Skin Health and Integrity  Outcome: Met     Problem: Infection  Goal: Absence of Infection Signs and Symptoms  Outcome: Met     Problem: Fall Injury Risk  Goal: Absence of Fall and Fall-Related Injury  Outcome: Met

## 2024-02-08 NOTE — PROGRESS NOTES
SW met with patient at bedside. Patient reports mild confusion and lost train of thought, easily redirected. Patient confirmed she would like to go home with hospice, today if possible.    SW spoke to patient's daughter, Ann, who confirmed these are the patient's wishes. Previously patient wanted to stay inpatient but has now decided she wants to be at home. Ann chose [facility], SW spoke to [facility] [phone]. Referral packet sent. Patient accepted, [facility] scheduled ambulance transport for 1p, notified bedside RN and left VM with patient's daughter.    [facility] notified that transportation is 1 hour early via VM from Ann, confirmed with bedside RN. [facility]OREY notified Farideh at [facility].

## 2024-02-08 NOTE — NURSING
Pt discharged from unit to home with family. IV's removed.Transferred to stretcher x3 assist. Mask applied. Sister and daughter at bedside. Personal items in hand. Left unit with [facility].

## 2024-02-08 NOTE — DISCHARGE SUMMARY
[facility] - Telemetry Stepdown  Hematology  Bone Marrow Transplant  Discharge Summary      Patient Name: Sabrina Mac  MRN: [id number]  Admission Date: 2/5/2024  Hospital Length of Stay: 3 days  Discharge Date and Time:  02/08/2024 2:35 PM  Attending Physician: Robert Elmore MD   Discharging Provider: Robert Elmore MD  Primary Care Provider: Earnestine, Primary Doctor    HPI: No notes on file    * No surgery found *     Brief Hospital Summary:  She started azacitidine plus venetoclax two weeks ago. She presented to the ER at [facility] with weakness and fevers. Initially admitted for possible UTI as a source for her fevers. COVID was positive respiratory viral panel. Her fevers improved with supportive care. Blood cultures and urine culture were negative. During this admission she expressed a desire to discontinue further treatment and return home with home hospice. She had a meeting with our team, her family, and Dr. Troy. All were in agreement with her plans for home hospice.     Hospital Course: 02/05/2024 Dr. Troy patient with AML on aza/ kirby. Chemo held while admitted.Transferred from [facility] for neutropenic fever. Patient previously completed abx for UTI. Had recurrence of symptoms with new back pain. Presented to ER. Lactic acid was elevated at 2.7, resulted at 1.3 today. Chest x-ray without acute process. CT abdomen with L non-obstructive kidney stone. Patient received ceftriaxone and cefepime. Repeat urinalysis with occult blood and trace leukocytes, nitrates negative. Urine culture pending. Is continuing cefepime at [facility]. Blood cultures completed on 2/5. Patient experiencing new vocal hoarseness. Respiratory viral panel ordered. Oxygen via nasal cannula being administered. Kidney function elevated compared to patient baseline with BUN 30, creatinine 1.4. Blood pressures running on the lower end. 2 units of blood and 1 unit of platelets ordered today.   02/06/2024 Patient of   [facility] with AML. Admitted for neutropenic fever. On cefepime for 2 days. D/c cefepime today to transition to levaquin 750mg then to decrease to 500mg prophylaxis dose. Remdesivir for COVID+19 started on 2/5. Afebrile since transfer on 2/5. Continuing acyclovir and posaconazole. On prednisone for rheumatologic arthritis. Patient would like to discontinue chemotherapy and transition to supportive care.   02/07/2024 AML patient s/p cycle 1 aza/kirby admitted with covid-19 and neutropenic fever. Remains cytopenic. 1 unit of platelets administered today. Blood cultures NGTD. Urine culture with no growth. Transitioned antibiotics to oral levauqin. Patient would like to transition to supportive care measures. Patient  not ready to discharge home. Patient is ready to go home, but will need assistive devices and home health. Ordered PT/OT evaluation while admitted.     Goals of Care Treatment Preferences:  Code Status: DNR      Consults (From admission, onward)          Status Ordering Provider     Inpatient consult to Midline team  Once        Provider:  (Not yet assigned)    ABELINO Quintero            Significant Diagnostic Studies: N/A    Pending Diagnostic Studies:       None          Final Active Diagnoses:    Diagnosis Date Noted POA    PRINCIPAL PROBLEM:  Neutropenic fever [D70.9, R50.81] 07/24/2018 Yes    Itching [L29.9] 02/06/2024 No    UTI (urinary tract infection) [N39.0] 02/05/2024 Yes    Constipation [K59.00] 02/05/2024 Yes    Voice hoarseness [R49.0] 02/05/2024 Yes    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] 01/08/2024 Yes    Impaired mobility [Z74.09] 02/04/2021 Yes    Transaminitis [R74.01] 02/02/2021 Yes    Pancytopenia [D61.818] 10/27/2020 Yes    AML (acute myeloblastic leukemia) [C92.00] 08/14/2018 Yes    Back pain [M54.9] 07/11/2018 Yes    Depression with anxiety [F41.8] 05/25/2018 Yes      Problems Resolved During this Admission:      Discharged Condition:  stable    Disposition: Home or Self Care    Follow Up:    Patient Instructions:      Diet Adult Regular     Notify your health care provider if you experience any of the following:  temperature >100.4     Notify your health care provider if you experience any of the following:  persistent nausea and vomiting or diarrhea     Notify your health care provider if you experience any of the following:  severe uncontrolled pain     Notify your health care provider if you experience any of the following:  redness, tenderness, or signs of infection (pain, swelling, redness, odor or green/yellow discharge around incision site)     Notify your health care provider if you experience any of the following:  difficulty breathing or increased cough     Notify your health care provider if you experience any of the following:  severe persistent headache     Notify your health care provider if you experience any of the following:  worsening rash     Notify your health care provider if you experience any of the following:  persistent dizziness, light-headedness, or visual disturbances     Notify your health care provider if you experience any of the following:  increased confusion or weakness     Activity as tolerated     Medications:  Reconciled Home Medications:      Medication List        ASK your doctor about these medications      acyclovir 5% 5 % ointment  Commonly known as: ZOVIRAX  Apply topically every 3 (three) hours.     ALPRAZolam 2 MG Tab  Commonly known as: XANAX  TAKE ONE TABLET BY MOUTH NIGHTLY AS NEEDED     BANOPHEN 50 MG capsule  Generic drug: diphenhydrAMINE  TAKE ONE CAPSULE BY MOUTH NIGHTLY AS NEEDED FOR ITCHING OR INSOMNIA     EScitalopram oxalate 20 MG tablet  Commonly known as: LEXAPRO  TAKE ONE TABLET BY MOUTH EACH EVENING     methocarbamoL 500 MG Tab  Commonly known as: ROBAXIN  Take 1 tablet (500 mg total) by mouth 4 (four) times daily.     metoprolol succinate 25 MG 24 hr tablet  Commonly known as:  TOPROL-XL  Take 1 tablet (25 mg total) by mouth once daily.     MULTIVITAMIN GUMMIES ORAL  Take by mouth.     nitrofurantoin (macrocrystal-monohydrate) 100 MG capsule  Commonly known as: MACROBID  Take 1 capsule (100 mg total) by mouth 2 (two) times daily.     omeprazole 20 MG capsule  Commonly known as: PRILOSEC  Take 1 capsule (20 mg total) by mouth 2 (two) times daily before meals.     ondansetron 8 MG tablet  Commonly known as: ZOFRAN  Take 1 tablet (8 mg total) by mouth every 12 (twelve) hours as needed for Nausea.     ondansetron 8 MG Tbdl  Commonly known as: ZOFRAN-ODT  Take 8 mg by mouth 3 (three) times daily.     * oxyCODONE 20 mg Tab immediate release tablet  Commonly known as: ROXICODONE  Take 1 tablet (20 mg total) by mouth every 6 (six) hours as needed (pain).     * oxyCODONE 10 mg Tab immediate release tablet  Commonly known as: ROXICODONE  Take 1 tablet (10 mg total) by mouth every 6 (six) hours as needed (pain).     * oxyCODONE 10 mg Tab immediate release tablet  Commonly known as: ROXICODONE  Take 1 tablet (10 mg total) by mouth every 4 (four) hours as needed for Pain.     * predniSONE 20 MG tablet  Commonly known as: DELTASONE  TAKE ONE TABLET BY MOUTH ONCE DAILY AS NEEDED     * predniSONE 20 MG tablet  Commonly known as: DELTASONE  Take 1 tablet (20 mg total) by mouth once daily.     tiZANidine 4 MG tablet  Commonly known as: ZANAFLEX  Take 1 tablet (4 mg total) by mouth 3 (three) times daily.     VENCLEXTA 100 mg Tab  Generic drug: venetoclax  Take 400 mg (4 tablets) by mouth once daily.           * This list has 5 medication(s) that are the same as other medications prescribed for you. Read the directions carefully, and ask your doctor or other care provider to review them with you.                  Robert Elmore MD  Bone Marrow Transplant  [facility] - Telemetry Stepdown

## 2024-02-09 PROBLEM — I70.0 AORTIC ATHEROSCLEROSIS: Status: ACTIVE | Noted: 2024-02-09

## 2024-02-09 RX ORDER — LORAZEPAM 1 MG/1
2 TABLET ORAL EVERY 4 HOURS PRN
Qty: 100 TABLET | Refills: 0 | Status: SHIPPED | OUTPATIENT
Start: 2024-02-09 | End: 2025-02-08

## 2024-02-10 LAB
BACTERIA BLD CULT: NORMAL
BACTERIA BLD CULT: NORMAL

## 2024-04-01 LAB
COMMENT: NORMAL
FINAL PATHOLOGIC DIAGNOSIS: NORMAL
GROSS: NORMAL
Lab: NORMAL
MICROSCOPIC EXAM: NORMAL
SUPPLEMENTAL DIAGNOSIS: NORMAL

## 2024-05-06 PROBLEM — N39.0 UTI (URINARY TRACT INFECTION): Status: RESOLVED | Noted: 2024-02-05 | Resolved: 2024-05-06

## 2024-10-10 NOTE — ASSESSMENT & PLAN NOTE
- Corrected calcium 8.8 today.  --1g calcium chloride given PRN  - continue Calcium carbonate TID and Vitamin D 5,000 units daily for now, patient has been refusing due to nausea  - if Ca normalizes can switch to lower dose Calcium-Vit D supplement   Have You Had Fillers Before?: has not had fillers

## (undated) DEVICE — APPLICATOR CHLORAPREP ORN 26ML

## (undated) DEVICE — SOL IRR NACL .9% 3000ML

## (undated) DEVICE — ADHESIVE DERMABOND ADVANCED

## (undated) DEVICE — SUT VICRYL PLUS 0 CT1 18IN

## (undated) DEVICE — DRAPE INCISE IOBAN 2 23X17IN

## (undated) DEVICE — DRESSING LEUKOPLAST FLEX 1X3IN

## (undated) DEVICE — MASK FLYTE HOOD PEEL AWAY

## (undated) DEVICE — TRAY BONE MARROW BEK3411

## (undated) DEVICE — SUT MONOCRYL 3-0 PS-2 UND

## (undated) DEVICE — SYR SLIP TIP 10ML SHIELD

## (undated) DEVICE — DRESSING AQUACEL AG ADV 3.5X12

## (undated) DEVICE — SUT ETHIBOND XTRA2 OS-4 30

## (undated) DEVICE — Device

## (undated) DEVICE — SUT 2-0 VICRYL / SH (J417)

## (undated) DEVICE — SEE MEDLINE ITEM 152487

## (undated) DEVICE — NDL SPINAL 20GX3.5 HUB

## (undated) DEVICE — ELECTRODE REM PLYHSV RETURN 9

## (undated) DEVICE — BLADE RECP OFFSET 77.5X11X1.23

## (undated) DEVICE — GAUZE SPONGE 4X4 12PLY

## (undated) DEVICE — NDL 18GA X1 1/2 REG BEVEL

## (undated) DEVICE — SEE MEDLINE ITEM 157128

## (undated) DEVICE — SEE L#156916

## (undated) DEVICE — SUT ETHIBOND XTRA 1 OS-6

## (undated) DEVICE — KIT IRR SUCTION HND PIECE

## (undated) DEVICE — SUT VICRYL BR 1 GEN 27 CT-1

## (undated) DEVICE — TAPE SURG DURAPORE 2 X10YD

## (undated) DEVICE — SUT VICRYL+ 1 CT1 18IN

## (undated) DEVICE — DRAPE STERI INSTRUMENT 1018

## (undated) DEVICE — SEE MEDLINE ITEM 146347

## (undated) DEVICE — SEE MEDLINE ITEM 157131

## (undated) DEVICE — SUT VICRYL PLUS 3-0 SH 18IN

## (undated) DEVICE — SEE MEDLINE ITEM 146292